# Patient Record
Sex: MALE | Race: WHITE | NOT HISPANIC OR LATINO | Employment: OTHER | URBAN - METROPOLITAN AREA
[De-identification: names, ages, dates, MRNs, and addresses within clinical notes are randomized per-mention and may not be internally consistent; named-entity substitution may affect disease eponyms.]

---

## 2017-01-09 ENCOUNTER — ALLSCRIPTS OFFICE VISIT (OUTPATIENT)
Dept: OTHER | Facility: OTHER | Age: 57
End: 2017-01-09

## 2017-01-09 DIAGNOSIS — I95.9 HYPOTENSION: ICD-10-CM

## 2017-01-09 DIAGNOSIS — E11.40 TYPE 2 DIABETES MELLITUS WITH DIABETIC NEUROPATHY (HCC): ICD-10-CM

## 2017-01-09 DIAGNOSIS — E11.9 TYPE 2 DIABETES MELLITUS WITHOUT COMPLICATIONS (HCC): ICD-10-CM

## 2017-01-09 DIAGNOSIS — E78.5 HYPERLIPIDEMIA: ICD-10-CM

## 2017-04-11 ENCOUNTER — ALLSCRIPTS OFFICE VISIT (OUTPATIENT)
Dept: OTHER | Facility: OTHER | Age: 57
End: 2017-04-11

## 2017-04-13 ENCOUNTER — ALLSCRIPTS OFFICE VISIT (OUTPATIENT)
Dept: OTHER | Facility: OTHER | Age: 57
End: 2017-04-13

## 2017-04-18 ENCOUNTER — GENERIC CONVERSION - ENCOUNTER (OUTPATIENT)
Dept: OTHER | Facility: OTHER | Age: 57
End: 2017-04-18

## 2017-04-18 LAB
LEFT EYE DIABETIC RETINOPATHY: NORMAL
RIGHT EYE DIABETIC RETINOPATHY: NORMAL

## 2017-07-13 ENCOUNTER — ALLSCRIPTS OFFICE VISIT (OUTPATIENT)
Dept: OTHER | Facility: OTHER | Age: 57
End: 2017-07-13

## 2017-09-13 ENCOUNTER — GENERIC CONVERSION - ENCOUNTER (OUTPATIENT)
Dept: OTHER | Facility: OTHER | Age: 57
End: 2017-09-13

## 2017-11-03 ENCOUNTER — ALLSCRIPTS OFFICE VISIT (OUTPATIENT)
Dept: OTHER | Facility: OTHER | Age: 57
End: 2017-11-03

## 2017-11-03 DIAGNOSIS — I95.9 HYPOTENSION: ICD-10-CM

## 2017-11-03 DIAGNOSIS — E78.5 HYPERLIPIDEMIA: ICD-10-CM

## 2017-11-03 DIAGNOSIS — E11.43 TYPE 2 DIABETES MELLITUS WITH AUTONOMIC NEUROPATHY (HCC): ICD-10-CM

## 2017-11-03 DIAGNOSIS — E11.9 TYPE 2 DIABETES MELLITUS WITHOUT COMPLICATIONS (HCC): ICD-10-CM

## 2017-11-03 DIAGNOSIS — E11.40 TYPE 2 DIABETES MELLITUS WITH DIABETIC NEUROPATHY (HCC): ICD-10-CM

## 2017-11-03 LAB — HBA1C MFR BLD HPLC: 10.5 %

## 2017-12-15 ENCOUNTER — APPOINTMENT (EMERGENCY)
Dept: RADIOLOGY | Facility: HOSPITAL | Age: 57
End: 2017-12-15
Payer: COMMERCIAL

## 2017-12-15 ENCOUNTER — HOSPITAL ENCOUNTER (EMERGENCY)
Facility: HOSPITAL | Age: 57
Discharge: HOME | End: 2017-12-16
Attending: EMERGENCY MEDICINE | Admitting: EMERGENCY MEDICINE
Payer: COMMERCIAL

## 2017-12-15 DIAGNOSIS — R06.02 SOB (SHORTNESS OF BREATH): ICD-10-CM

## 2017-12-15 DIAGNOSIS — I10 HYPERTENSION: Primary | ICD-10-CM

## 2017-12-15 LAB
ANION GAP SERPL CALCULATED.3IONS-SCNC: 8 MMOL/L (ref 4–13)
ATRIAL RATE: 70 BPM
ATRIAL RATE: 84 BPM
BASOPHILS # BLD AUTO: 0.05 THOUSANDS/ΜL (ref 0–0.1)
BASOPHILS NFR BLD AUTO: 1 % (ref 0–1)
BUN SERPL-MCNC: 14 MG/DL (ref 5–25)
CALCIUM SERPL-MCNC: 10 MG/DL (ref 8.3–10.1)
CHLORIDE SERPL-SCNC: 101 MMOL/L (ref 100–108)
CO2 SERPL-SCNC: 28 MMOL/L (ref 21–32)
CREAT SERPL-MCNC: 1.03 MG/DL (ref 0.6–1.3)
EOSINOPHIL # BLD AUTO: 0.32 THOUSAND/ΜL (ref 0–0.61)
EOSINOPHIL NFR BLD AUTO: 4 % (ref 0–6)
ERYTHROCYTE [DISTWIDTH] IN BLOOD BY AUTOMATED COUNT: 13.6 % (ref 11.6–15.1)
GFR SERPL CREATININE-BSD FRML MDRD: 80 ML/MIN/1.73SQ M
GLUCOSE SERPL-MCNC: 242 MG/DL (ref 65–140)
GLUCOSE SERPL-MCNC: 250 MG/DL (ref 65–140)
HCT VFR BLD AUTO: 44.9 % (ref 36.5–49.3)
HGB BLD-MCNC: 14.7 G/DL (ref 12–17)
LYMPHOCYTES # BLD AUTO: 2.96 THOUSANDS/ΜL (ref 0.6–4.47)
LYMPHOCYTES NFR BLD AUTO: 33 % (ref 14–44)
MCH RBC QN AUTO: 28.3 PG (ref 26.8–34.3)
MCHC RBC AUTO-ENTMCNC: 32.7 G/DL (ref 31.4–37.4)
MCV RBC AUTO: 87 FL (ref 82–98)
MONOCYTES # BLD AUTO: 0.75 THOUSAND/ΜL (ref 0.17–1.22)
MONOCYTES NFR BLD AUTO: 8 % (ref 4–12)
NEUTROPHILS # BLD AUTO: 4.9 THOUSANDS/ΜL (ref 1.85–7.62)
NEUTS SEG NFR BLD AUTO: 54 % (ref 43–75)
P AXIS: 69 DEGREES
P AXIS: 70 DEGREES
PLATELET # BLD AUTO: 270 THOUSANDS/UL (ref 149–390)
PMV BLD AUTO: 10.6 FL (ref 8.9–12.7)
POTASSIUM SERPL-SCNC: 5.3 MMOL/L (ref 3.5–5.3)
PR INTERVAL: 124 MS
PR INTERVAL: 142 MS
QRS AXIS: -9 DEGREES
QRS AXIS: 4 DEGREES
QRSD INTERVAL: 90 MS
QRSD INTERVAL: 92 MS
QT INTERVAL: 368 MS
QT INTERVAL: 394 MS
QTC INTERVAL: 425 MS
QTC INTERVAL: 434 MS
RBC # BLD AUTO: 5.19 MILLION/UL (ref 3.88–5.62)
SODIUM SERPL-SCNC: 137 MMOL/L (ref 136–145)
SPECIMEN SOURCE: NORMAL
T WAVE AXIS: 89 DEGREES
T WAVE AXIS: 97 DEGREES
TROPONIN I BLD-MCNC: 0 NG/ML (ref 0–0.08)
VENTRICULAR RATE: 70 BPM
VENTRICULAR RATE: 84 BPM
WBC # BLD AUTO: 8.98 THOUSAND/UL (ref 4.31–10.16)

## 2017-12-15 PROCEDURE — 84484 ASSAY OF TROPONIN QUANT: CPT

## 2017-12-15 PROCEDURE — 80048 BASIC METABOLIC PNL TOTAL CA: CPT | Performed by: EMERGENCY MEDICINE

## 2017-12-15 PROCEDURE — 93005 ELECTROCARDIOGRAM TRACING: CPT

## 2017-12-15 PROCEDURE — 82948 REAGENT STRIP/BLOOD GLUCOSE: CPT

## 2017-12-15 PROCEDURE — 85025 COMPLETE CBC W/AUTO DIFF WBC: CPT | Performed by: EMERGENCY MEDICINE

## 2017-12-15 PROCEDURE — 93005 ELECTROCARDIOGRAM TRACING: CPT | Performed by: EMERGENCY MEDICINE

## 2017-12-15 PROCEDURE — 36415 COLL VENOUS BLD VENIPUNCTURE: CPT | Performed by: EMERGENCY MEDICINE

## 2017-12-15 PROCEDURE — 71020 HB CHEST X-RAY 2VW FRONTAL&LATL: CPT

## 2017-12-15 PROCEDURE — 96374 THER/PROPH/DIAG INJ IV PUSH: CPT

## 2017-12-15 RX ORDER — ATORVASTATIN CALCIUM 40 MG/1
40 TABLET, FILM COATED ORAL
COMMUNITY
Start: 2017-03-02 | End: 2018-02-20 | Stop reason: SDUPTHER

## 2017-12-15 RX ORDER — LORAZEPAM 2 MG/ML
0.5 INJECTION INTRAMUSCULAR ONCE
Status: COMPLETED | OUTPATIENT
Start: 2017-12-15 | End: 2017-12-15

## 2017-12-15 RX ORDER — MIDODRINE HYDROCHLORIDE 5 MG/1
5 TABLET ORAL
COMMUNITY
Start: 2017-03-02 | End: 2018-02-20 | Stop reason: SDUPTHER

## 2017-12-15 RX ORDER — METOCLOPRAMIDE 5 MG/1
5 TABLET ORAL DAILY
COMMUNITY
End: 2018-04-04 | Stop reason: HOSPADM

## 2017-12-15 RX ADMIN — LORAZEPAM 0.5 MG: 2 INJECTION INTRAMUSCULAR; INTRAVENOUS at 23:03

## 2017-12-16 VITALS
SYSTOLIC BLOOD PRESSURE: 157 MMHG | TEMPERATURE: 98.1 F | DIASTOLIC BLOOD PRESSURE: 70 MMHG | RESPIRATION RATE: 18 BRPM | WEIGHT: 256 LBS | HEART RATE: 79 BPM | OXYGEN SATURATION: 99 %

## 2017-12-16 LAB
ALBUMIN SERPL BCP-MCNC: 3.5 G/DL (ref 3.5–5)
ALP SERPL-CCNC: 53 U/L (ref 46–116)
ALT SERPL W P-5'-P-CCNC: 41 U/L (ref 12–78)
AST SERPL W P-5'-P-CCNC: 34 U/L (ref 5–45)
BILIRUB DIRECT SERPL-MCNC: 0.07 MG/DL (ref 0–0.2)
BILIRUB SERPL-MCNC: 0.49 MG/DL (ref 0.2–1)
NT-PROBNP SERPL-MCNC: 794 PG/ML
PROT SERPL-MCNC: 7.7 G/DL (ref 6.4–8.2)
SPECIMEN SOURCE: NORMAL
TROPONIN I BLD-MCNC: 0.01 NG/ML (ref 0–0.08)

## 2017-12-16 PROCEDURE — 80076 HEPATIC FUNCTION PANEL: CPT | Performed by: EMERGENCY MEDICINE

## 2017-12-16 PROCEDURE — 93005 ELECTROCARDIOGRAM TRACING: CPT

## 2017-12-16 PROCEDURE — 36415 COLL VENOUS BLD VENIPUNCTURE: CPT | Performed by: EMERGENCY MEDICINE

## 2017-12-16 PROCEDURE — 84484 ASSAY OF TROPONIN QUANT: CPT

## 2017-12-16 PROCEDURE — 83880 ASSAY OF NATRIURETIC PEPTIDE: CPT | Performed by: EMERGENCY MEDICINE

## 2017-12-16 PROCEDURE — 99284 EMERGENCY DEPT VISIT MOD MDM: CPT

## 2017-12-16 NOTE — DISCHARGE INSTRUCTIONS
Chronic Hypertension, Ambulatory Care   GENERAL INFORMATION:   Chronic hypertension  is a long-term condition in which your blood pressure (BP) is higher than normal  Your BP is the force of your blood moving against the walls of your arteries  Hypertension is a BP of 140/90 or higher  Common symptoms include the following:   · Headache     · Blurred vision    · Chest pain     · Dizziness or weakness     · Trouble breathing     · Nosebleeds  Seek immediate care for the following symptoms:   · Severe headache or vision loss    · Weakness in an arm or leg    · Confusion or difficulty speaking    · Discomfort in your chest that feels like squeezing, pressure, fullness, or pain    · Suddenly feeling lightheaded or trouble breathing    · Pain or discomfort in your back, neck, jaw, stomach, or arm  Treatment for chronic hypertension  may include medicine to lower your BP  You may also need to make lifestyle changes  Take your medicine exactly as directed  Manage chronic hypertension:   · Take your BP at home  Sit and rest for 5 minutes before you take your BP  Extend your arm and support it on a flat surface  Your arm should be at the same level as your heart  Follow the directions that came with your BP monitor  If possible, take at least 2 BP readings each time  Take your BP at least twice a day at the same times each day, such as morning and evening  Keep a log of your BP readings and bring it to your follow-up visits  · Eat less sodium (salt)  Do not add sodium to your food  Limit foods that are high in sodium, such as canned foods, potato chips, and cold cuts  Your healthcare provider may suggest that you follow the [address] [place] [address] Street  The plan is low in sodium, unhealthy fats, and total fat  It is high in potassium, calcium, and fiber  · Exercise regularly  Exercise at least 30 minutes per day, on most days of the week  This will help decrease your BP   Ask your healthcare provider about the best exercise plan for you  · Limit alcohol  Women should limit alcohol to 1 drink a day  Men should limit alcohol to 2 drinks a day  A drink of alcohol is 12 ounces of beer, 5 ounces of wine, or 1½ ounces of liquor  · Do not smoke  If you smoke, it is never too late to quit  Smoking can increase your BP  Smoking also worsens other health conditions you may have that can increase your risk for hypertension  Ask your healthcare provider for information if you need help quitting  Follow up with your healthcare provider as directed: You will need to return to have your BP checked and to have other lab tests done  Write down your questions so you remember to ask them during your visits  CARE AGREEMENT:   You have the right to help plan your care  Learn about your health condition and how it may be treated  Discuss treatment options with your caregivers to decide what care you want to receive  You always have the right to refuse treatment  The above information is an  only  It is not intended as medical advice for individual conditions or treatments  Talk to your doctor, nurse or pharmacist before following any medical regimen to see if it is safe and effective for you  © 2014 [address] is for End User's use only and may not be sold, redistributed or otherwise used for commercial purposes  All illustrations and images included in CareNotes® are the copyrighted property of [employer]  or [employer]  Hypertension   WHAT YOU NEED TO KNOW:   Hypertension is high blood pressure (BP)  Your BP is the force of your blood moving against the walls of your arteries  Normal BP is less than 120/80  Prehypertension is between 120/80 and 139/89  Hypertension is 140/90 or higher  Hypertension causes your BP to get so high that your heart has to work much harder than normal  This can damage your heart   You can control hypertension with a healthy lifestyle or medicines  A controlled blood pressure helps protect your organs, such as your heart, lungs, brain, and kidneys  DISCHARGE INSTRUCTIONS:   Call 911 for any of the following:   · You have discomfort in your chest that feels like squeezing, pressure, fullness, or pain  · You become confused or have difficulty speaking  · You suddenly feel lightheaded or have trouble breathing  · You have pain or discomfort in your back, neck, jaw, stomach, or arm  Return to the emergency department if:   · You have a severe headache or vision loss  · You have weakness in an arm or leg  Contact your healthcare provider if:   · You feel faint, dizzy, confused, or drowsy  · You have been taking your BP medicine and your BP is still higher than your healthcare provider says it should be  · You have questions or concerns about your condition or care  Medicines: You may  need any of the following:  · Medicine  may be used to help lower your BP  You may need more than one type of medicine  Take the medicine exactly as directed  · Diuretics  help decrease extra fluid that collects in your body  This will help lower your BP  You may urinate more often while you take this medicine  · Cholesterol medicine  helps lower your cholesterol level  A low cholesterol level helps prevent heart disease and makes it easier to control your blood pressure  · Take your medicine as directed  Contact your healthcare provider if you think your medicine is not helping or if you have side effects  Tell him or her if you are allergic to any medicine  Keep a list of the medicines, vitamins, and herbs you take  Include the amounts, and when and why you take them  Bring the list or the pill bottles to follow-up visits  Carry your medicine list with you in case of an emergency  Follow up with your healthcare provider as directed: You will need to return to have your BP checked and to have other lab tests done   Write down your questions so you remember to ask them during your visits  Manage hypertension:  Talk with your healthcare provider about these and other ways to manage hypertension:  · Check your BP at home  Sit and rest for 5 minutes before you take your BP  Extend your arm and support it on a flat surface  Your arm should be at the same level as your heart  Follow the directions that came with your BP monitor  If possible, take at least 2 BP readings each time  Take your BP at least twice a day at the same times each day, such as morning and evening  Keep a record of your BP readings and bring it to your follow-up visits  Ask your healthcare provider what your BP should be  · Limit sodium (salt) as directed  Too much sodium can affect your fluid balance  Check labels to find low-sodium or no-salt-added foods  Some low-sodium foods use potassium salts for flavor  Too much potassium can also cause health problems  Your healthcare provider will tell you how much sodium and potassium are safe for you to have in a day  He or she may recommend that you limit sodium to 2,300 mg a day  · Follow the meal plan recommended by your healthcare provider  A dietitian or your provider can give you more information on low-sodium plans or the DASH (Dietary Approaches to Stop Hypertension) eating plan  The DASH plan is low in sodium, unhealthy fats, and total fat  It is high in potassium, calcium, and fiber  · Exercise to maintain a healthy weight  Exercise at least 30 minutes per day, on most days of the week  This will help decrease your blood pressure  Ask your healthcare provider about the best exercise plan for you  · Decrease stress  This may help lower your BP  Learn ways to relax, such as deep breathing or listening to music  · Limit alcohol  Women should limit alcohol to 1 drink a day  Men should limit alcohol to 2 drinks a day   A drink of alcohol is 12 ounces of beer, 5 ounces of wine, or 1½ ounces of liquor  · Do not smoke  Nicotine and other chemicals in cigarettes and cigars can increase your BP and also cause lung damage  Ask your healthcare provider for information if you currently smoke and need help to quit  E-cigarettes or smokeless tobacco still contain nicotine  Talk to your healthcare provider before you use these products  · Manage any other health conditions you have  Health conditions such as diabetes can increase your risk for hypertension  Follow your healthcare provider's instructions and take all your medicines as directed  © 2017 [address]  Information is for End User's use only and may not be sold, redistributed or otherwise used for commercial purposes  All illustrations and images included in CareNotes® are the copyrighted property of [employer]  or [employer]  The above information is an  only  It is not intended as medical advice for individual conditions or treatments  Talk to your doctor, nurse or pharmacist before following any medical regimen to see if it is safe and effective for you  Shortness of Breath   WHAT YOU NEED TO KNOW:   What is shortness of breath? Shortness of breath is a feeling that you cannot get enough air when you breathe in  You may have this feeling only during activity, or all the time  Your symptoms can range from mild to severe  Shortness of breath may be a sign of a serious health condition that needs immediate care  What causes or increases my risk for shortness of breath?    · A lung condition, such as pneumonia, asthma, or a blood clot in your lung    · Heart, kidney, or liver disease    · Lung cancer or lung damage, such as from asbestos    · Smoking cigarettes    · Anxiety or a panic attack    · Physical activity such as running or climbing stairs, especially if you are out of shape    · Being overweight    · Travel to high altitude    · Anemia (low red blood cell count)  How is the cause of shortness of breath diagnosed? Your healthcare provider will listen to your breathing and check for signs of a serious health condition  Signs include blue lips or fingernails or chest pain that happens with shortness of breath  Tell your provider if shortness of breath keeps you from walking or talking easily  Your provider will also check your memory and alertness  Tell your provider when your shortness of breath started and how severe it is  Describe anything that starts your symptoms, such as physical activity  Also tell your provider anything you do to make breathing easier  You may also need any of the following:  · Blood tests  may be used to check your oxygen level or to find health conditions such as anemia  Anemia is too few red blood cells (RBCs)  RBCs carry oxygen throughout your body  Blood tests may also be used to check for signs of infection or organ failure  · A pulse oximeter  is a device that measures the amount of oxygen in your blood  A cord with a clip or sticky strip is placed on your finger, ear, or toe  The other end of the cord is hooked to a machine  · Spirometry  is a test to measure how strong your breathing is  You will breathe out as hard as you can into a plastic tube called a spirometer  · X-ray  pictures may show signs of infection or fluid around your heart or lungs  · Exercise tests  help your healthcare provider learn if you have symptoms that limit activity  Symptoms include leg pain, fatigue, and weakness  Exercise tests can also show if your symptoms are caused by heart problems  · CT scan  pictures may show blood clots or an area of disease in your lungs  You may be given contrast liquid to help your lungs show up better in the pictures  Tell the healthcare provider if you have ever had an allergic reaction to contrast liquid  · An echocardiogram  is a type of ultrasound   Sound waves are used to show the structure and function of your heart     · An EKG  is a test that measures the electrical activity of your heart  How is shortness of breath treated? · Medicines  may be used to treat the cause of your symptoms  You may need medicine to treat a bacterial infection or reduce anxiety  Other medicines may be used to open your airway, reduce swelling, or remove extra fluid  If you have a heart condition, you may need medicine to help your heart beat more strongly or regularly  · Oxygen  may be given to help you breathe more easily  What can I do to manage shortness of breath? · Create an action plan  You and your healthcare provider can work together to create a plan for how to handle shortness of breath  The plan can include daily activities, treatment changes, and what to do if you have severe breathing problems  · Lean forward on your elbows when you sit  This helps your lungs expand and may make it easier to breathe  · Use pursed-lip breathing any time you feel short of breath  Breathe in through your nose and then slowly breathe out through your mouth with your lips slightly puckered  It should take you twice as long to breathe out as it did to breathe in  · Do not smoke  Nicotine and other chemicals in cigarettes and cigars can cause lung damage and make shortness of breath worse  Ask your healthcare provider for information if you currently smoke and need help to quit  E-cigarettes or smokeless tobacco still contain nicotine  Talk to your healthcare provider before you use these products  · Reach or maintain a healthy weight  Your healthcare provider can help you create a safe weight loss plan if you are overweight  · Exercise as directed  Exercise can help your lungs work more easily  Exercise can also help you lose weight if needed  Try to get at least 30 minutes of exercise most days of the week  When should I seek immediate care?    · Your signs and symptoms are the same or worse within 24 hours of treatment  · The skin over your ribs or on your neck sinks in when you breathe  · You feel confused or dizzy  When should I contact my healthcare provider? · You have new or worsening symptoms  · You have questions or concerns about your condition or care  CARE AGREEMENT:   You have the right to help plan your care  Learn about your health condition and how it may be treated  Discuss treatment options with your caregivers to decide what care you want to receive  You always have the right to refuse treatment  The above information is an  only  It is not intended as medical advice for individual conditions or treatments  Talk to your doctor, nurse or pharmacist before following any medical regimen to see if it is safe and effective for you  © 2017 [address]  Information is for End User's use only and may not be sold, redistributed or otherwise used for commercial purposes  All illustrations and images included in CareNotes® are the copyrighted property of [employer]  or [employer]

## 2017-12-16 NOTE — ED NOTES
Pt not complaining of any stroke symptoms; states "My blood pressure is usually low but it was high tonight and I read online that I can get a stroke so I'm here because I think I'm having a stroke"        Yanet Donato RN  12/15/17 [id number]

## 2017-12-16 NOTE — ED ATTENDING ATTESTATION
Ed DO Shabnam, saw and evaluated the patient  I have discussed the patient with the resident/non-physician practitioner and agree with the resident's/non-physician practitioner's findings, Plan of Care, and MDM as documented in the resident's/non-physician practitioner's note, except where noted  All available labs and Radiology studies were reviewed  At this point I agree with the current assessment done in the Emergency Department  I have conducted an independent evaluation of this patient a history and physical is as follows:    Patient is a 62-year-old male with a history of diabetes and orthostatic hypotension that presents for 2 weeks of intermittent shortness of breath  He states that he has been feeling anxious and stressed lately  He denies any chest pain  He states that he has been having some digestive issues where he feels like his food isn't passing like it should  He is on Reglan for diabetic gastroparesis  States that he has had some intermittent leg swelling  He states that yes to sleep in a recliner and is unable to lie flat  The reason why came in tonight ultimately was because his blood pressure was elevated  He is on Midrin but states that he has not taken any more than he normally does  He states that he was feeling a little bit more short of breath and he had blood pressure readings in the 359 systolic at home  This is markedly abnormal and given his symptoms he came into the emergency department for evaluation  On the initial residence exam the patient was a little anxious  On my exam he was much more calm and relaxed  He did receive Ativan  On my exam his vital signs were normal with a blood pressure in the 130s over 60s  Heart and lungs were regular rate and rhythm and clear to auscultation bilaterally  Abdomen was soft, nontender and nondistended  He had no JVD  He had no pretibial edema but mild nonpitting edema to the bilateral feet      Labs and EKG were done along with a chest x-ray  All these showed no abnormalities  BMP was slightly elevated in the 700s but no clinical evidence of CHF on exam or on chest x-ray  EKG did show some nonspecific changes, more specifically mild ST depression in the lateral leads  Looking back at his [facility] records he did have some nonspecific changes on his EKG is there  Unsure if it was were seen today  This does not appear to be cardiac in nature but the plan is to continue to observe to do a 3 hour delta troponin  This is normal and his blood pressure remains in the normal range without symptoms I feel that he is safe for discharge home with follow-up with his PCP and Cardiology  Patient agrees with this plan    Critical Care Time  CritCare Time

## 2017-12-16 NOTE — ED RE-EVALUATION NOTE
Repeat troponin is 0 01  This is in the normal range  His repeat EKG actually appears improved with the mild ST depressions that he had before  Blood pressure is slightly elevated at 157/70  Patient has no shortness of breath at this time  He we had a long discussion again about anxiety  I advised that he follow up with his PCP and if he absolutely needs something in the meantime to try some Benadryl  We discussed returning to the ER if anything worsens and other precautions  Patient and his wife understand and agree with the plan of care  They agree with follow-up and all questions and concerns answered

## 2017-12-17 LAB
ATRIAL RATE: 79 BPM
P AXIS: 74 DEGREES
PR INTERVAL: 134 MS
QRS AXIS: 0 DEGREES
QRSD INTERVAL: 96 MS
QT INTERVAL: 378 MS
QTC INTERVAL: 433 MS
T WAVE AXIS: 94 DEGREES
VENTRICULAR RATE: 79 BPM

## 2017-12-28 ENCOUNTER — GENERIC CONVERSION - ENCOUNTER (OUTPATIENT)
Dept: OTHER | Facility: OTHER | Age: 57
End: 2017-12-28

## 2017-12-28 DIAGNOSIS — I95.9 HYPOTENSION: ICD-10-CM

## 2017-12-28 DIAGNOSIS — E11.9 TYPE 2 DIABETES MELLITUS WITHOUT COMPLICATIONS (HCC): ICD-10-CM

## 2017-12-28 DIAGNOSIS — E55.9 VITAMIN D DEFICIENCY: ICD-10-CM

## 2017-12-28 DIAGNOSIS — E78.5 HYPERLIPIDEMIA: ICD-10-CM

## 2017-12-28 DIAGNOSIS — E11.43 TYPE 2 DIABETES MELLITUS WITH AUTONOMIC NEUROPATHY (HCC): ICD-10-CM

## 2017-12-28 DIAGNOSIS — E11.40 TYPE 2 DIABETES MELLITUS WITH DIABETIC NEUROPATHY (HCC): ICD-10-CM

## 2017-12-28 DIAGNOSIS — R60.0 LOCALIZED EDEMA: ICD-10-CM

## 2018-01-02 ENCOUNTER — TRANSCRIBE ORDERS (OUTPATIENT)
Dept: LAB | Facility: CLINIC | Age: 58
End: 2018-01-02

## 2018-01-02 ENCOUNTER — APPOINTMENT (OUTPATIENT)
Dept: LAB | Facility: CLINIC | Age: 58
End: 2018-01-02
Payer: COMMERCIAL

## 2018-01-02 DIAGNOSIS — I95.9 HYPOTENSION: ICD-10-CM

## 2018-01-02 DIAGNOSIS — E11.9 TYPE 2 DIABETES MELLITUS WITHOUT COMPLICATIONS (HCC): ICD-10-CM

## 2018-01-02 DIAGNOSIS — E11.40 TYPE 2 DIABETES MELLITUS WITH DIABETIC NEUROPATHY (HCC): ICD-10-CM

## 2018-01-02 DIAGNOSIS — E78.5 HYPERLIPIDEMIA: ICD-10-CM

## 2018-01-02 DIAGNOSIS — E11.43 TYPE 2 DIABETES MELLITUS WITH AUTONOMIC NEUROPATHY (HCC): ICD-10-CM

## 2018-01-02 DIAGNOSIS — E55.9 VITAMIN D DEFICIENCY: ICD-10-CM

## 2018-01-02 LAB
ALBUMIN SERPL BCP-MCNC: 3.7 G/DL (ref 3.5–5)
ALP SERPL-CCNC: 46 U/L (ref 46–116)
ALT SERPL W P-5'-P-CCNC: 44 U/L (ref 12–78)
ANION GAP SERPL CALCULATED.3IONS-SCNC: 9 MMOL/L (ref 4–13)
AST SERPL W P-5'-P-CCNC: 25 U/L (ref 5–45)
BACTERIA UR QL AUTO: ABNORMAL /HPF
BASOPHILS # BLD AUTO: 0.04 THOUSANDS/ΜL (ref 0–0.1)
BASOPHILS NFR BLD AUTO: 0 % (ref 0–1)
BILIRUB SERPL-MCNC: 0.44 MG/DL (ref 0.2–1)
BILIRUB UR QL STRIP: NEGATIVE
BUN SERPL-MCNC: 19 MG/DL (ref 5–25)
CALCIUM SERPL-MCNC: 9.8 MG/DL (ref 8.3–10.1)
CHLORIDE SERPL-SCNC: 100 MMOL/L (ref 100–108)
CHOLEST SERPL-MCNC: 169 MG/DL (ref 50–200)
CLARITY UR: CLEAR
CO2 SERPL-SCNC: 29 MMOL/L (ref 21–32)
COLOR UR: YELLOW
CREAT SERPL-MCNC: 1.08 MG/DL (ref 0.6–1.3)
EOSINOPHIL # BLD AUTO: 0.32 THOUSAND/ΜL (ref 0–0.61)
EOSINOPHIL NFR BLD AUTO: 3 % (ref 0–6)
ERYTHROCYTE [DISTWIDTH] IN BLOOD BY AUTOMATED COUNT: 13.2 % (ref 11.6–15.1)
GFR SERPL CREATININE-BSD FRML MDRD: 75 ML/MIN/1.73SQ M
GLUCOSE P FAST SERPL-MCNC: 128 MG/DL (ref 65–99)
GLUCOSE UR STRIP-MCNC: ABNORMAL MG/DL
HCT VFR BLD AUTO: 42.9 % (ref 36.5–49.3)
HDLC SERPL-MCNC: 38 MG/DL (ref 40–60)
HGB BLD-MCNC: 14.3 G/DL (ref 12–17)
HGB UR QL STRIP.AUTO: NEGATIVE
HYALINE CASTS #/AREA URNS LPF: ABNORMAL /LPF
KETONES UR STRIP-MCNC: NEGATIVE MG/DL
LDLC SERPL CALC-MCNC: 96 MG/DL (ref 0–100)
LEUKOCYTE ESTERASE UR QL STRIP: ABNORMAL
LYMPHOCYTES # BLD AUTO: 2.57 THOUSANDS/ΜL (ref 0.6–4.47)
LYMPHOCYTES NFR BLD AUTO: 24 % (ref 14–44)
MCH RBC QN AUTO: 28.9 PG (ref 26.8–34.3)
MCHC RBC AUTO-ENTMCNC: 33.3 G/DL (ref 31.4–37.4)
MCV RBC AUTO: 87 FL (ref 82–98)
MONOCYTES # BLD AUTO: 0.8 THOUSAND/ΜL (ref 0.17–1.22)
MONOCYTES NFR BLD AUTO: 8 % (ref 4–12)
NEUTROPHILS # BLD AUTO: 6.81 THOUSANDS/ΜL (ref 1.85–7.62)
NEUTS SEG NFR BLD AUTO: 65 % (ref 43–75)
NITRITE UR QL STRIP: NEGATIVE
NON-SQ EPI CELLS URNS QL MICRO: ABNORMAL /HPF
NRBC BLD AUTO-RTO: 0 /100 WBCS
PH UR STRIP.AUTO: 6 [PH] (ref 4.5–8)
PLATELET # BLD AUTO: 265 THOUSANDS/UL (ref 149–390)
PMV BLD AUTO: 10.6 FL (ref 8.9–12.7)
POTASSIUM SERPL-SCNC: 4.4 MMOL/L (ref 3.5–5.3)
PROT SERPL-MCNC: 8.3 G/DL (ref 6.4–8.2)
PROT UR STRIP-MCNC: ABNORMAL MG/DL
RBC # BLD AUTO: 4.94 MILLION/UL (ref 3.88–5.62)
RBC #/AREA URNS AUTO: ABNORMAL /HPF
SODIUM SERPL-SCNC: 138 MMOL/L (ref 136–145)
SP GR UR STRIP.AUTO: 1.02 (ref 1–1.03)
TRIGL SERPL-MCNC: 176 MG/DL
TSH SERPL DL<=0.05 MIU/L-ACNC: 3.22 UIU/ML (ref 0.36–3.74)
UROBILINOGEN UR QL STRIP.AUTO: 0.2 E.U./DL
WBC # BLD AUTO: 10.56 THOUSAND/UL (ref 4.31–10.16)
WBC #/AREA URNS AUTO: ABNORMAL /HPF

## 2018-01-02 PROCEDURE — 80053 COMPREHEN METABOLIC PANEL: CPT

## 2018-01-02 PROCEDURE — 81001 URINALYSIS AUTO W/SCOPE: CPT

## 2018-01-02 PROCEDURE — 84443 ASSAY THYROID STIM HORMONE: CPT

## 2018-01-02 PROCEDURE — 36415 COLL VENOUS BLD VENIPUNCTURE: CPT

## 2018-01-02 PROCEDURE — 85025 COMPLETE CBC W/AUTO DIFF WBC: CPT

## 2018-01-02 PROCEDURE — 80061 LIPID PANEL: CPT

## 2018-01-10 NOTE — PROGRESS NOTES
Assessment    1  Type 2 diabetes mellitus (250 00) (E11 9)   2  Diabetic autonomic neuropathy associated with type 2 diabetes mellitus (250 60,337 1)   (E11 43)   3  Diabetic neuropathy, type II diabetes mellitus (250 60,357 2) (E11 40)   4  Hyperlipidemia (272 4) (E78 5)   5  Hypotension (458 9) (I95 9)    Plan   Diabetic autonomic neuropathy associated with type 2 diabetes mellitus    · Metoclopramide HCl - 5 MG Oral Tablet; Take 1 tablet 4 times daily  Diabetic autonomic neuropathy associated with type 2 diabetes mellitus, Diabetic  neuropathy, type II diabetes mellitus, Hyperlipidemia, Hypotension, Type 2 diabetes  mellitus    · Follow-up visit in 3 months Evaluation and Treatment  Follow-up  Status: Hold For -  Scheduling  Requested for: [id number]   · (1) CBC/PLT/DIFF; Status:Active; Requested DXI:[id number];    · (1) COMPREHENSIVE METABOLIC PANEL; Status:Active; Requested for:03Nov2017;    · (1) HEMOGLOBIN A1C; Status:Active; Requested RPC:65VMS0246;    · (1) LIPID PANEL FASTING W DIRECT LDL REFLEX; Status:Active; Requested  for:03Nov2017;    · (1) MICROALBUMIN CREATININE RATIO, RANDOM URINE; Status:Active; Requested  for:03Nov2017;    · (1) TSH WITH FT4 REFLEX; Status:Active; Requested for:03Nov2017;   Hyperlipidemia    · From  Atorvastatin Calcium 40 MG Oral Tablet TAKE 1 TABLET AT BEDTIME  To Atorvastatin Calcium 40 MG Oral Tablet TAKE ONE TABLET BY MOUTH ONCE DAILY  Hypotension    · Midodrine HCl - 5 MG Oral Tablet; TAKE 1 TABLET 3 TIMES DAILY    Hemoglobin A1c- POC; Status:Complete;   Done: 07UKK5782 12:00AM  DPK:[id number]; Ordered; For:Diabetic neuropathy, type II diabetes mellitus; Ordered By:Melissa Viramontes; Chief Complaint  Patient presents today for physical  He had A1c POC results 10 5  Patient states he brought disability paper work to be filled out   He requests to be perscribed metoclopram 5 mg and midodrine 5 mg or some kind of alternative because he stopped taking them because they were to expensive  Requested orthostatic blood pressures to be taken  No other concerns  History of Present Illness  HPI: Patient is here for follow-up on diabetes hypertension hyperlipidemia orthostatic hypotension diabetic neuropathy and autonomic neuropathy  Patient using walker as well as cane for ambulation  Patient with blurred vision  Patient will see the ophthalmologist again on the 20 second  Patient was without insurance for roughly 4 months but recently got insurance again  Patient was off insulin for roughly 2 months  Patient wishes to restart medications  A1c 10 5 today  Patient is test strips also  Patient does notice some bloating and gassiness no chest pain or shortness of breath  Patient with intermittent constipation and diarrhea  Diet unchanged  Patient still with fatigue and tiredness  Patient could only stand for 3 minutes and then patient notices back pain and tightness  Review of Systems    Constitutional: feeling tired, but as noted in HPI  Eyes: as noted in HPI    ENT: no complaints of earache, no hearing loss, no nosebleeds, no nasal discharge, no sore throat, no hoarseness  Cardiovascular: No complaints of slow heart rate, no fast heart rate, no chest pain, no palpitations, no leg claudication, no lower extremity  Respiratory: No complaints of shortness of breath, no wheezing, no cough, no SOB on exertion, no orthopnea or PND  Gastrointestinal: as noted in HPI  Genitourinary: as noted in HPI  Musculoskeletal: as noted in HPI  Integumentary: No complaints of skin rash or skin lesions, no itching, no skin wound, no dry skin  Neurological: as noted in HPI  Psychiatric: Is not suicidal, no sleep disturbances, no anxiety or depression, no change in personality, no emotional problems  Endocrine: No complaints of proptosis, no hot flashes, no muscle weakness, no erectile dysfunction, no deepening of the voice, no feelings of weakness     Hematologic/Lymphatic: No complaints of swollen glands, no swollen glands in the neck, does not bleed easily, no easy bruising  Active Problems    1  Depression screening (V79 0) (Z13 89)   2  Diabetic autonomic neuropathy associated with type 2 diabetes mellitus (250 60,337 1)   (E11 43)   3  Diabetic neuropathy, type II diabetes mellitus (250 60,357 2) (E11 40)   4  Fractured rib (807 00) (S22 39XA)   5  Hyperlipidemia (272 4) (E78 5)   6  Hypotension (458 9) (I95 9)   7  Peripheral neuropathy (356 9) (G62 9)   8  Right shoulder pain (719 41) (M25 511)   9  Type 2 diabetes mellitus (250 00) (E11 9)   10  Vitamin D deficiency (268 9) (E55 9)    Family History  Mother    · No pertinent family history    Social History    · Former smoker (G60 27) (S41 486)   · No alcohol use    Current Meds   1  Atorvastatin Calcium 40 MG Oral Tablet; TAKE 1 TABLET AT BEDTIME; Therapy: (Recorded:11Apr2017) to Recorded   2  Metoclopramide HCl - 5 MG Oral Tablet; Take 1 tablet 4 times daily; Therapy: (Recorded:11Apr2017) to Recorded   3  Midodrine HCl - 5 MG Oral Tablet; TAKE 1 TABLET 3 TIMES DAILY; Therapy: (Recorded:11Apr2017) to Recorded   4  Ladonna-Rul Vitamin D 5000 UNIT Oral Tablet; Take 1 tablet daily; Therapy: (Recorded:11Apr2017) to Recorded   5  NovoLOG Mix 70/30 FlexPen (70-30) 100 UNIT/ML Subcutaneous Suspension   Pen-injector; INJECT 80 UNIT Twice daily; Therapy: 69SGT2665 to (Last Rx:17Skb4774)  Requested for: 54Olh6133 Ordered    Allergies    1  MetFORMIN HCl TABS    Vitals   Recorded: 49HML5621 03:18PM Recorded: 21MSJ7695 03:17PM   Temperature   99 F, Tympanic   Systolic 327, RUE, Standing 130, RUE, Supine 140, RUE, Sitting   Diastolic 62, RUE, Standing 76, RUE, Supine 90, RUE, Sitting   Height   6 ft 1 in   Weight   262 lb    BMI Calculated   34 57   BSA Calculated   2 41     Physical Exam    Constitutional   General appearance: Abnormal     Eyes   Conjunctiva and lids: No swelling, erythema, or discharge      Pupils and irises: Equal, round and reactive to light  Ears, Nose, Mouth, and Throat   External inspection of ears and nose: Normal     Otoscopic examination: Tympanic membrance translucent with normal light reflex  Canals patent without erythema  Oropharynx: Normal with no erythema, edema, exudate or lesions  Pulmonary   Respiratory effort: No increased work of breathing or signs of respiratory distress  Auscultation of lungs: Clear to auscultation  Cardiovascular   Palpation of heart: Normal PMI, no thrills  Auscultation of heart: Normal rate and rhythm, normal S1 and S2, without murmurs  Examination of extremities for edema and/or varicosities: Normal     Abdomen   Abdomen: Non-tender, no masses  Liver and spleen: No hepatomegaly or splenomegaly  Lymphatic   Palpation of lymph nodes in neck: No lymphadenopathy  Musculoskeletal   Gait and station: Abnormal     Digits and nails: Normal without clubbing or cyanosis  Inspection/palpation of joints, bones, and muscles: Abnormal     Skin   Skin and subcutaneous tissue: Abnormal   abrasions anterior shins  Neurologic   Cranial nerves: Cranial nerves 2-12 intact      Reflexes: Abnormal     Sensation: Abnormal     Psychiatric   Orientation to person, place and time: Normal     Mood and affect: Normal        Signatures   Electronically signed by : Natasha Mendez DO; Nov  3 2017  3:44PM EST                       (Author)

## 2018-01-11 ENCOUNTER — ALLSCRIPTS OFFICE VISIT (OUTPATIENT)
Dept: OTHER | Facility: OTHER | Age: 58
End: 2018-01-11

## 2018-01-11 LAB — GLUCOSE SERPL-MCNC: 130 MG/DL

## 2018-01-11 NOTE — RESULT NOTES
Message   The patient is to have a follow appointment to review his lab results       Verified Results  (1) CBC/PLT/DIFF 12Zea3126 09:24AM Nithya Barrientos     Test Name Result Flag Reference   WBC 11 2 x10E3/uL H 3 4-10 8   RBC 5 36 x10E6/uL  4 14-5 80   Hemoglobin 15 4 g/dL  12 6-17 7   Hematocrit 46 3 %  37 5-51 0   MCV 86 fL  79-97   MCH 28 7 pg  26 6-33 0   MCHC 33 3 g/dL  31 5-35 7   RDW 14 4 %  12 3-15 4   Platelets 461 Z29X3/IO  150-379   Neutrophils 66 %     Lymphs 25 %     Monocytes 6 %     Eos 3 %     Basos 0 %     Neutrophils (Absolute) 7 4 x10E3/uL H 1 4-7 0   Lymphs (Absolute) 2 7 x10E3/uL  0 7-3 1   Monocytes(Absolute) 0 7 x10E3/uL  0 1-0 9   Eos (Absolute) 0 3 x10E3/uL  0 0-0 4   Baso (Absolute) 0 0 x10E3/uL  0 0-0 2   Immature Granulocytes 0 %     Immature Grans (Abs) 0 0 x10E3/uL  0 0-0 1

## 2018-01-12 NOTE — MISCELLANEOUS
Provider Comments  Provider Comments:   PT MISSED APPOINTMENT 7/13/2017 WITH DR PIKN      Signatures   Electronically signed by : Lashonda Zuñiga, ; Jul 13 2017 10:56AM EST                       (Author)

## 2018-01-12 NOTE — PROGRESS NOTES
Assessment   1  Type 2 diabetes mellitus with diabetic autonomic neuropathy, with long-term current use     of insulin (250 60,337 1,V58 67) (O87 53,X00 8)   2  Bilateral leg edema (782 3) (R60 0)   3  Anxiety (300 00) (F41 9)    Plan   Anxiety    · FLUoxetine HCl - 10 MG Oral Capsule; TAKE 1 CAPSULE Daily   · LORazepam 0 5 MG Oral Tablet (Ativan); TAKE 1 TABLET EVERY 12 HOURS AS    NEEDED  Bilateral leg edema    · Furosemide 20 MG Oral Tablet (Lasix); TAKE 1 TABLET EVERY DAY AS NEEDED   · SARA Stockings; Status:Complete;   Done: 59EQC7624 11:12AM   · Follow-up visit in 2 months Evaluation and Treatment  Follow-up  Status: Hold For -    Scheduling  Requested for: 68BOP7484  Diabetic autonomic neuropathy associated with type 2 diabetes mellitus, Diabetic    neuropathy, type II diabetes mellitus, Diabetic peripheral neuropathy, Hyperlipidemia    · Blood Glucose- POC; Status:Resulted - Requires Verification,Retrospective By Protocol    Authorization;   Done: 48RPE3394 10:38AM  Fasting (Y/N) : Yes - Y    Discussion/Summary      Patient will continue with Lantus as prescribed as his sugars are improved  Chief Complaint   pt presents for a f/u post last week visit  pt reports feet still swollen  pt also having issues with some on his meds  History of Present Illness   Patient follow-up on diabetes hypertension along with lower extremity edema  Patient also with history of anxiety  Patient started escitalopram and had the diarrhea  Patient stopped medication after 1 dose  Patient to see Podiatry  They are recommending compression stocks or diuretic  No cp or sob at rest  No change in urination or defecation at this time  Blood sugars are better on Lantus  Patient's blood sugar this morning 130  Review of Systems        Constitutional: No fever or chills, feels well, no tiredness, no recent weight gain or weight loss-- and-- not feeling tired        Eyes: as noted in HPI       ENT: no complaints of earache, no hearing loss, no nosebleeds, no nasal discharge, no sore throat, no hoarseness  Cardiovascular: No complaints of slow heart rate, no fast heart rate, no chest pain, no palpitations, no leg claudication, no lower extremity  Respiratory: No complaints of shortness of breath, no wheezing, no cough, no SOB on exertion, no orthopnea or PND  Gastrointestinal: as noted in HPI  Genitourinary: No complaints of dysuria, no incontinence, no hesitancy, no nocturia, no genital lesion, no testicular pain  Musculoskeletal: as noted in HPI  Integumentary: No complaints of skin rash or skin lesions, no itching, no skin wound, no dry skin  Neurological: as noted in HPI  Psychiatric: Is not suicidal, no sleep disturbances, no anxiety or depression, no change in personality, no emotional problems  Endocrine: No complaints of proptosis, no hot flashes, no muscle weakness, no erectile dysfunction, no deepening of the voice, no feelings of weakness  Hematologic/Lymphatic: No complaints of swollen glands, no swollen glands in the neck, does not bleed easily, no easy bruising  Active Problems   1  Anxiety and depression (300 00,311) (F41 8)   2  Depression screening (V79 0) (Z13 89)   3  Diabetic autonomic neuropathy associated with type 2 diabetes mellitus (250 60,337 1)     (E11 43)   4  Diabetic neuropathy, type II diabetes mellitus (250 60,357 2) (E11 40)   5  Diabetic peripheral neuropathy (250 60,357 2) (E11 42)   6  Fractured rib (807 00) (S22 39XA)   7  Hyperlipidemia (272 4) (E78 5)   8  Hypotension (458 9) (I95 9)   9  Peripheral neuropathy (356 9) (G62 9)   10  Pressure ulcer of toe of left foot, unspecified ulcer stage (392 29,914 95) (L89 899)   11  Right shoulder pain (719 41) (M25 511)   12  Type 2 diabetes mellitus with diabetic autonomic neuropathy, with long-term current use      of insulin (250 60,337 1,V58 67) (E11 43,Z79 4)   13   Vitamin D deficiency (268 9) (E55 9)    Past Medical History   1  History of Diabetic autonomic neuropathy associated with type 2 diabetes mellitus     (250 60,337 1) (E11 43)     The active problems and past medical history were reviewed and updated today  Family History   Mother    1  No pertinent family history    Social History    · Former smoker (V15 82) (V09 062)   · No alcohol use    Current Meds    1  Atorvastatin Calcium 40 MG Oral Tablet; TAKE ONE TABLET BY MOUTH ONCE DAILY; Last Rx:03Nov2017 Ordered   2  Lantus SoloStar 100 UNIT/ML Subcutaneous Solution Pen-injector; Inject 50 units twice     daily; Therapy: 81UVT0845 to (Last Rx:29Dec2017)  Requested for: 55Tzt5183 Ordered   3  Metoclopramide HCl - 5 MG Oral Tablet; Take 1 tablet 4 times daily  Requested for:     90EUX2246; Last Rx:03Nov2017 Ordered   4  Midodrine HCl - 5 MG Oral Tablet; TAKE 1 TABLET 3 TIMES DAILY  Requested for:     46DBA3236; Last Rx:03Nov2017 Ordered   5  Ladonna-Rul Vitamin D 5000 UNIT Oral Tablet; Take 1 tablet daily; Therapy: (Recorded:56Jzl3056) to Recorded     The medication list was reviewed and updated today  Allergies   1  MetFORMIN HCl TABS    Vitals   Vital Signs    Recorded: 38YSC1160 54:98TD   Systolic 561, LUE, Sitting   Diastolic 72, LUE, Sitting   Height 6 ft 1 in   Weight 253 lb    BMI Calculated 33 38   BSA Calculated 2 38     Physical Exam        Constitutional      General appearance: No acute distress, well appearing and well nourished  Eyes      Conjunctiva and lids: No swelling, erythema, or discharge  Pupils and irises: Equal, round and reactive to light  Ears, Nose, Mouth, and Throat      External inspection of ears and nose: Normal        Otoscopic examination: Tympanic membrance translucent with normal light reflex  Canals patent without erythema  Nasal mucosa, septum, and turbinates: Normal without edema or erythema  Oropharynx: Normal with no erythema, edema, exudate or lesions  Pulmonary      Respiratory effort: No increased work of breathing or signs of respiratory distress  Auscultation of lungs: Clear to auscultation, equal breath sounds bilaterally, no wheezes, no rales, no rhonci  Cardiovascular      Palpation of heart: Normal PMI, no thrills  Auscultation of heart: Normal rate and rhythm, normal S1 and S2, without murmurs  Examination of extremities for edema and/or varicosities: Abnormal  -- 2/4 pitting edema bilaterally lower extremities  Carotid pulses: Normal        Lymphatic      Palpation of lymph nodes in neck: No lymphadenopathy  Musculoskeletal      Gait and station: Abnormal        Digits and nails: Normal without clubbing or cyanosis  Inspection/palpation of joints, bones, and muscles: Abnormal        Skin      Skin and subcutaneous tissue: Normal without rashes or lesions  Neurologic      Cranial nerves: Cranial nerves 2-12 intact  Reflexes: 2+ and symmetric  Sensation: Abnormal        Psychiatric      Orientation to person, place and time: Normal        Mood and affect: Normal           Results/Data   Blood Glucose- POC 02EBP3229 10:38AM Hussain Schneider      Test Name Result Flag Reference   Glucose Finger Stick 130                      Future Appointments      Date/Time Provider Specialty Site   06/28/2018 04:00 PM KEVIN Mcdermott   [address] [facility] [address] [address]     Signatures    Electronically signed by : Patricia Cook DO; Jan 11 2018 11:14AM EST                       (Author)

## 2018-01-13 VITALS
SYSTOLIC BLOOD PRESSURE: 82 MMHG | RESPIRATION RATE: 16 BRPM | DIASTOLIC BLOOD PRESSURE: 50 MMHG | HEIGHT: 73 IN | WEIGHT: 252 LBS | BODY MASS INDEX: 33.4 KG/M2 | HEART RATE: 78 BPM

## 2018-01-13 VITALS
BODY MASS INDEX: 33.53 KG/M2 | DIASTOLIC BLOOD PRESSURE: 50 MMHG | WEIGHT: 253 LBS | SYSTOLIC BLOOD PRESSURE: 72 MMHG | HEIGHT: 73 IN

## 2018-01-13 NOTE — MISCELLANEOUS
To whom it may concern,     My patient, Shay Duron DOS: 1960, recently was approved for assistance with Novolog 70/30 pens  The order for the insulin has been placed and the patient receved the shipment however  he recieved the vials rather than the pen in which was clearly noted on the order form  Jam Clark is stable on the pen  Due to his diabetic condition he has great difficulty with eyesight and feeling within his extremities  The patient requires and needs to  use the pen rather than the vials due to this fact  I feel it is medically necessary for the patient to recieve the pens rather than the vials due to his medical condition(s)  If you have any further questions or concerns, feel  free to contact my office  Thank you in advance,          LLOYD Reece            Electronically signed by:Stefany Gamez Post   Sep 13 2017 12:29PM EST Author

## 2018-01-14 VITALS
SYSTOLIC BLOOD PRESSURE: 114 MMHG | WEIGHT: 254 LBS | BODY MASS INDEX: 33.66 KG/M2 | HEIGHT: 73 IN | DIASTOLIC BLOOD PRESSURE: 68 MMHG

## 2018-01-15 VITALS
HEIGHT: 73 IN | SYSTOLIC BLOOD PRESSURE: 100 MMHG | WEIGHT: 262 LBS | DIASTOLIC BLOOD PRESSURE: 62 MMHG | BODY MASS INDEX: 34.72 KG/M2 | TEMPERATURE: 99 F

## 2018-01-18 ENCOUNTER — TRANSCRIBE ORDERS (OUTPATIENT)
Dept: ADMINISTRATIVE | Facility: HOSPITAL | Age: 58
End: 2018-01-18

## 2018-01-18 DIAGNOSIS — R60.0 LOCALIZED EDEMA: Primary | ICD-10-CM

## 2018-01-23 VITALS
SYSTOLIC BLOOD PRESSURE: 134 MMHG | DIASTOLIC BLOOD PRESSURE: 72 MMHG | WEIGHT: 253 LBS | BODY MASS INDEX: 33.53 KG/M2 | HEIGHT: 73 IN

## 2018-01-24 VITALS
DIASTOLIC BLOOD PRESSURE: 74 MMHG | OXYGEN SATURATION: 98 % | WEIGHT: 255.25 LBS | BODY MASS INDEX: 33.83 KG/M2 | SYSTOLIC BLOOD PRESSURE: 130 MMHG | TEMPERATURE: 96.8 F | HEIGHT: 73 IN | HEART RATE: 76 BPM

## 2018-01-25 ENCOUNTER — PATIENT OUTREACH (OUTPATIENT)
Dept: FAMILY MEDICINE CLINIC | Facility: CLINIC | Age: 58
End: 2018-01-25

## 2018-01-25 NOTE — PROGRESS NOTES
Wing Blinks started torsemide several days ago and feels his LE edema has improved slightly  He is scheduled to have an ultrasound tomorrow  He is watching his sodium intake  His FBS today was 138 and he also checks before dinner and his sugars range in the 150's  He had recent eye laser surgery and will be having cataract surgery in the future  His f/u appointments are scheduled

## 2018-01-26 ENCOUNTER — HOSPITAL ENCOUNTER (OUTPATIENT)
Dept: ULTRASOUND IMAGING | Facility: MEDICAL CENTER | Age: 58
Discharge: HOME | End: 2018-01-26
Payer: COMMERCIAL

## 2018-01-26 DIAGNOSIS — R60.0 LOCALIZED EDEMA: ICD-10-CM

## 2018-01-26 PROCEDURE — 93970 EXTREMITY STUDY: CPT

## 2018-01-26 PROCEDURE — 93970 EXTREMITY STUDY: CPT | Performed by: SURGERY

## 2018-01-29 ENCOUNTER — TELEPHONE (OUTPATIENT)
Dept: FAMILY MEDICINE CLINIC | Facility: CLINIC | Age: 58
End: 2018-01-29

## 2018-01-29 NOTE — TELEPHONE ENCOUNTER
----- Message from Eladia Calderon DO sent at 1/29/2018  9:26 AM EST -----  Call patient    Doppler studies of the lower extremities normal

## 2018-02-20 ENCOUNTER — OFFICE VISIT (OUTPATIENT)
Dept: FAMILY MEDICINE CLINIC | Facility: CLINIC | Age: 58
End: 2018-02-20
Payer: COMMERCIAL

## 2018-02-20 VITALS
BODY MASS INDEX: 51.16 KG/M2 | DIASTOLIC BLOOD PRESSURE: 80 MMHG | WEIGHT: 260.6 LBS | HEIGHT: 60 IN | SYSTOLIC BLOOD PRESSURE: 120 MMHG

## 2018-02-20 DIAGNOSIS — L97.421 DIABETIC ULCER OF LEFT HEEL ASSOCIATED WITH TYPE 2 DIABETES MELLITUS, LIMITED TO BREAKDOWN OF SKIN (HCC): ICD-10-CM

## 2018-02-20 DIAGNOSIS — E11.43 DIABETIC AUTONOMIC NEUROPATHY ASSOCIATED WITH TYPE 2 DIABETES MELLITUS (HCC): ICD-10-CM

## 2018-02-20 DIAGNOSIS — E78.2 MIXED HYPERLIPIDEMIA: ICD-10-CM

## 2018-02-20 DIAGNOSIS — Z79.4 TYPE 2 DIABETES MELLITUS WITH DIABETIC NEUROPATHY, WITH LONG-TERM CURRENT USE OF INSULIN (HCC): ICD-10-CM

## 2018-02-20 DIAGNOSIS — E11.40 TYPE 2 DIABETES MELLITUS WITH DIABETIC NEUROPATHY, WITH LONG-TERM CURRENT USE OF INSULIN (HCC): ICD-10-CM

## 2018-02-20 DIAGNOSIS — L97.421 DIABETIC ULCER OF LEFT HEEL ASSOCIATED WITH TYPE 2 DIABETES MELLITUS, LIMITED TO BREAKDOWN OF SKIN (HCC): Primary | ICD-10-CM

## 2018-02-20 DIAGNOSIS — E11.621 DIABETIC ULCER OF LEFT HEEL ASSOCIATED WITH TYPE 2 DIABETES MELLITUS, LIMITED TO BREAKDOWN OF SKIN (HCC): ICD-10-CM

## 2018-02-20 DIAGNOSIS — E11.621 DIABETIC ULCER OF LEFT HEEL ASSOCIATED WITH TYPE 2 DIABETES MELLITUS, LIMITED TO BREAKDOWN OF SKIN (HCC): Primary | ICD-10-CM

## 2018-02-20 PROBLEM — F32.A ANXIETY AND DEPRESSION: Status: ACTIVE | Noted: 2017-12-28

## 2018-02-20 PROBLEM — I10 BENIGN ESSENTIAL HTN: Status: ACTIVE | Noted: 2018-01-11

## 2018-02-20 PROBLEM — E11.42 DIABETIC PERIPHERAL NEUROPATHY (HCC): Status: ACTIVE | Noted: 2017-12-28

## 2018-02-20 PROBLEM — F41.9 ANXIETY AND DEPRESSION: Status: ACTIVE | Noted: 2017-12-28

## 2018-02-20 PROCEDURE — 99214 OFFICE O/P EST MOD 30 MIN: CPT | Performed by: FAMILY MEDICINE

## 2018-02-20 RX ORDER — INSULIN GLARGINE 100 [IU]/ML
80 INJECTION, SOLUTION SUBCUTANEOUS 2 TIMES DAILY
Qty: 45 PEN | Refills: 0 | Status: SHIPPED | OUTPATIENT
Start: 2018-02-20 | End: 2018-04-04 | Stop reason: HOSPADM

## 2018-02-20 RX ORDER — ATORVASTATIN CALCIUM 40 MG/1
40 TABLET, FILM COATED ORAL DAILY
Qty: 90 TABLET | Refills: 1 | Status: SHIPPED | OUTPATIENT
Start: 2018-02-20 | End: 2018-04-04 | Stop reason: HOSPADM

## 2018-02-20 RX ORDER — INSULIN GLARGINE 100 [IU]/ML
80 INJECTION, SOLUTION SUBCUTANEOUS 2 TIMES DAILY
Qty: 5 PEN | Refills: 5 | Status: SHIPPED | OUTPATIENT
Start: 2018-02-20 | End: 2018-02-20 | Stop reason: SDUPTHER

## 2018-02-20 RX ORDER — INSULIN GLARGINE 100 [IU]/ML
INJECTION, SOLUTION SUBCUTANEOUS
COMMUNITY
Start: 2018-01-29 | End: 2018-02-20 | Stop reason: SDUPTHER

## 2018-02-20 RX ORDER — TORSEMIDE 10 MG/1
TABLET ORAL
COMMUNITY
Start: 2018-01-18 | End: 2018-02-21 | Stop reason: SDUPTHER

## 2018-02-20 RX ORDER — INFLUENZA VIRUS VACCINE 15; 15; 15; 15 UG/.5ML; UG/.5ML; UG/.5ML; UG/.5ML
SUSPENSION INTRAMUSCULAR
COMMUNITY
Start: 2018-01-18 | End: 2018-03-14

## 2018-02-20 RX ORDER — MIDODRINE HYDROCHLORIDE 5 MG/1
5 TABLET ORAL 3 TIMES DAILY
Qty: 90 TABLET | Refills: 5 | Status: SHIPPED | OUTPATIENT
Start: 2018-02-20 | End: 2018-04-04 | Stop reason: HOSPADM

## 2018-02-20 NOTE — PROGRESS NOTES
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 mellitus, limited to breakdown of skin (HCC)  -     VAS lower limb arterial duplex, limited/unilateral; Future  -     silver sulfadiazine (SILVADENE,SSD) 1 % cream; Apply topically daily     Other orders  -     FLUARIX QUADRIVALENT 0 5 ML VICKIE;   -     LANTUS SOLOSTAR injection pen 100 units/mL;   -     torsemide (DEMADEX) 10 mg tablet;           Subjective:      Patient ID: Hardik Palomino is a 61 y o  male  Patient is here with open area on left heel for roughly 2 weeks  No significant bleeding noted  Patient also with some eschars of the 3rd and 4th toes on the left  Patient did see Podiatry proximally 1 month ago  No fevers noted  Medication Refill         The following portions of the patient's history were reviewed and updated as appropriate: allergies, current medications, past family history, past medical history, past social history, past surgical history and problem list     Review of Systems   Constitutional: Negative  HENT: Negative  Eyes: Negative  Respiratory: Negative  Cardiovascular: Negative  Gastrointestinal: Negative  Endocrine: Negative  Genitourinary: Negative  Musculoskeletal: Negative  Skin: Positive for wound  Allergic/Immunologic: Negative  Neurological: Negative  Hematological: Negative  Psychiatric/Behavioral: Negative            Objective:      /80 (BP Location: Left arm, Patient Position: Sitting, Cuff Size: Standard)   Ht 1' 10 25" (0 565 m)   Wt 118 kg (260 lb 9 6 oz)    16 kg/m²          Physical Exam   Skin:   2 x 3 cm opened callus with dry eschar underneath left heel  Patient with dry eschar distal portions of left 4th and 3rd toes  Nursing note and vitals reviewed

## 2018-02-20 NOTE — TELEPHONE ENCOUNTER
Patient states its cheaper to his lantus solostar [facility] with 45 pens  He is Requesting a call once it is approved

## 2018-02-21 DIAGNOSIS — R60.9 EDEMA, UNSPECIFIED TYPE: Primary | ICD-10-CM

## 2018-02-21 RX ORDER — TORSEMIDE 10 MG/1
10 TABLET ORAL DAILY
Qty: 30 TABLET | Refills: 0 | Status: SHIPPED | OUTPATIENT
Start: 2018-02-21 | End: 2018-05-23

## 2018-02-21 NOTE — TELEPHONE ENCOUNTER
Call patient  See if patient is taking another medication for cholesterol    If not, recommend patient taking atorvastatin as directed

## 2018-02-21 NOTE — TELEPHONE ENCOUNTER
Patient called asking why you prescribed him atorvastatin because he picked it up at the pharmacy and doesn't recall discussing this medication with you  Please address  He requests a call back from me

## 2018-03-06 ENCOUNTER — HOSPITAL ENCOUNTER (OUTPATIENT)
Dept: NON INVASIVE DIAGNOSTICS | Facility: CLINIC | Age: 58
Discharge: HOME | End: 2018-03-06
Payer: COMMERCIAL

## 2018-03-06 DIAGNOSIS — L97.421 DIABETIC ULCER OF LEFT HEEL ASSOCIATED WITH TYPE 2 DIABETES MELLITUS, LIMITED TO BREAKDOWN OF SKIN (HCC): ICD-10-CM

## 2018-03-06 DIAGNOSIS — E11.621 DIABETIC ULCER OF LEFT HEEL ASSOCIATED WITH TYPE 2 DIABETES MELLITUS, LIMITED TO BREAKDOWN OF SKIN (HCC): ICD-10-CM

## 2018-03-06 PROCEDURE — 93926 LOWER EXTREMITY STUDY: CPT | Performed by: SURGERY

## 2018-03-06 PROCEDURE — 93922 UPR/L XTREMITY ART 2 LEVELS: CPT | Performed by: SURGERY

## 2018-03-06 PROCEDURE — 93926 LOWER EXTREMITY STUDY: CPT

## 2018-03-14 ENCOUNTER — OFFICE VISIT (OUTPATIENT)
Dept: FAMILY MEDICINE CLINIC | Facility: CLINIC | Age: 58
End: 2018-03-14
Payer: COMMERCIAL

## 2018-03-14 ENCOUNTER — APPOINTMENT (EMERGENCY)
Dept: RADIOLOGY | Facility: HOSPITAL | Age: 58
DRG: 264 | End: 2018-03-14
Payer: COMMERCIAL

## 2018-03-14 ENCOUNTER — HOSPITAL ENCOUNTER (INPATIENT)
Facility: HOSPITAL | Age: 58
LOS: 9 days | DRG: 264 | End: 2018-03-23
Attending: EMERGENCY MEDICINE | Admitting: INTERNAL MEDICINE
Payer: COMMERCIAL

## 2018-03-14 VITALS
DIASTOLIC BLOOD PRESSURE: 70 MMHG | SYSTOLIC BLOOD PRESSURE: 110 MMHG | HEIGHT: 60 IN | BODY MASS INDEX: 51.68 KG/M2 | WEIGHT: 263.2 LBS

## 2018-03-14 DIAGNOSIS — I25.10 CORONARY ARTERY DISEASE: ICD-10-CM

## 2018-03-14 DIAGNOSIS — S91.302A NON-HEALING WOUND OF LEFT HEEL: Primary | ICD-10-CM

## 2018-03-14 DIAGNOSIS — R73.9 HYPERGLYCEMIA: ICD-10-CM

## 2018-03-14 DIAGNOSIS — R06.00 DYSPNEA: ICD-10-CM

## 2018-03-14 DIAGNOSIS — IMO0002 UNCONTROLLED DIABETES MELLITUS TYPE 2 WITH ATHEROSCLEROSIS OF ARTERIES OF EXTREMITIES: ICD-10-CM

## 2018-03-14 DIAGNOSIS — E11.40 TYPE 2 DIABETES MELLITUS WITH DIABETIC NEUROPATHY, WITH LONG-TERM CURRENT USE OF INSULIN (HCC): ICD-10-CM

## 2018-03-14 DIAGNOSIS — L97.421 DIABETIC ULCER OF LEFT HEEL ASSOCIATED WITH TYPE 2 DIABETES MELLITUS, LIMITED TO BREAKDOWN OF SKIN (HCC): ICD-10-CM

## 2018-03-14 DIAGNOSIS — E11.621 DIABETIC ULCER OF LEFT HEEL ASSOCIATED WITH TYPE 2 DIABETES MELLITUS, LIMITED TO BREAKDOWN OF SKIN (HCC): ICD-10-CM

## 2018-03-14 DIAGNOSIS — R77.8 ELEVATED TROPONIN: ICD-10-CM

## 2018-03-14 DIAGNOSIS — I73.9 PERIPHERAL VASCULAR DISEASE (HCC): ICD-10-CM

## 2018-03-14 DIAGNOSIS — Z79.4 TYPE 2 DIABETES MELLITUS WITH DIABETIC AUTONOMIC NEUROPATHY, WITH LONG-TERM CURRENT USE OF INSULIN (HCC): Primary | ICD-10-CM

## 2018-03-14 DIAGNOSIS — R94.31 T WAVE INVERSION IN EKG: ICD-10-CM

## 2018-03-14 DIAGNOSIS — E11.43 TYPE 2 DIABETES MELLITUS WITH DIABETIC AUTONOMIC NEUROPATHY, WITH LONG-TERM CURRENT USE OF INSULIN (HCC): Primary | ICD-10-CM

## 2018-03-14 DIAGNOSIS — Z79.4 TYPE 2 DIABETES MELLITUS WITH DIABETIC NEUROPATHY, WITH LONG-TERM CURRENT USE OF INSULIN (HCC): ICD-10-CM

## 2018-03-14 DIAGNOSIS — I96 GANGRENE (HCC): ICD-10-CM

## 2018-03-14 PROBLEM — R79.89 ELEVATED TROPONIN: Status: ACTIVE | Noted: 2018-03-14

## 2018-03-14 LAB
ALBUMIN SERPL BCP-MCNC: 2.8 G/DL (ref 3.5–5)
ALP SERPL-CCNC: 59 U/L (ref 46–116)
ALT SERPL W P-5'-P-CCNC: 21 U/L (ref 12–78)
ANION GAP SERPL CALCULATED.3IONS-SCNC: 9 MMOL/L (ref 4–13)
APTT PPP: 30 SECONDS (ref 23–35)
AST SERPL W P-5'-P-CCNC: 26 U/L (ref 5–45)
ATRIAL RATE: 84 BPM
BASOPHILS # BLD AUTO: 0.03 THOUSANDS/ΜL (ref 0–0.1)
BASOPHILS NFR BLD AUTO: 0 % (ref 0–1)
BILIRUB SERPL-MCNC: 0.4 MG/DL (ref 0.2–1)
BUN SERPL-MCNC: 14 MG/DL (ref 5–25)
CALCIUM SERPL-MCNC: 9 MG/DL (ref 8.3–10.1)
CHLORIDE SERPL-SCNC: 97 MMOL/L (ref 100–108)
CO2 SERPL-SCNC: 29 MMOL/L (ref 21–32)
CREAT SERPL-MCNC: 1.17 MG/DL (ref 0.6–1.3)
CRP SERPL QL: 79.5 MG/L
EOSINOPHIL # BLD AUTO: 0.22 THOUSAND/ΜL (ref 0–0.61)
EOSINOPHIL NFR BLD AUTO: 2 % (ref 0–6)
ERYTHROCYTE [DISTWIDTH] IN BLOOD BY AUTOMATED COUNT: 12.7 % (ref 11.6–15.1)
ERYTHROCYTE [SEDIMENTATION RATE] IN BLOOD: 100 MM/HOUR (ref 0–10)
EST. AVERAGE GLUCOSE BLD GHB EST-MCNC: 223 MG/DL
GFR SERPL CREATININE-BSD FRML MDRD: 68 ML/MIN/1.73SQ M
GLUCOSE SERPL-MCNC: 126 MG/DL (ref 65–140)
GLUCOSE SERPL-MCNC: 163 MG/DL (ref 65–140)
GLUCOSE SERPL-MCNC: 200 MG/DL (ref 65–140)
GLUCOSE SERPL-MCNC: 204 MG/DL (ref 65–140)
HBA1C MFR BLD: 9.4 % (ref 4.2–6.3)
HCT VFR BLD AUTO: 37.9 % (ref 36.5–49.3)
HGB BLD-MCNC: 12.4 G/DL (ref 12–17)
INR PPP: 1.06 (ref 0.86–1.16)
LYMPHOCYTES # BLD AUTO: 1.75 THOUSANDS/ΜL (ref 0.6–4.47)
LYMPHOCYTES NFR BLD AUTO: 14 % (ref 14–44)
MCH RBC QN AUTO: 27.7 PG (ref 26.8–34.3)
MCHC RBC AUTO-ENTMCNC: 32.7 G/DL (ref 31.4–37.4)
MCV RBC AUTO: 85 FL (ref 82–98)
MONOCYTES # BLD AUTO: 0.55 THOUSAND/ΜL (ref 0.17–1.22)
MONOCYTES NFR BLD AUTO: 5 % (ref 4–12)
NEUTROPHILS # BLD AUTO: 9.6 THOUSANDS/ΜL (ref 1.85–7.62)
NEUTS SEG NFR BLD AUTO: 79 % (ref 43–75)
NRBC BLD AUTO-RTO: 0 /100 WBCS
NT-PROBNP SERPL-MCNC: 527 PG/ML
P AXIS: 60 DEGREES
PLATELET # BLD AUTO: 267 THOUSANDS/UL (ref 149–390)
PLATELET # BLD AUTO: 283 THOUSANDS/UL (ref 149–390)
PMV BLD AUTO: 10 FL (ref 8.9–12.7)
PMV BLD AUTO: 9.7 FL (ref 8.9–12.7)
POTASSIUM SERPL-SCNC: 4.1 MMOL/L (ref 3.5–5.3)
PR INTERVAL: 138 MS
PROT SERPL-MCNC: 8.2 G/DL (ref 6.4–8.2)
PROTHROMBIN TIME: 13.8 SECONDS (ref 12.1–14.4)
QRS AXIS: -11 DEGREES
QRSD INTERVAL: 90 MS
QT INTERVAL: 362 MS
QTC INTERVAL: 427 MS
RBC # BLD AUTO: 4.47 MILLION/UL (ref 3.88–5.62)
SODIUM SERPL-SCNC: 135 MMOL/L (ref 136–145)
T WAVE AXIS: 138 DEGREES
TROPONIN I SERPL-MCNC: 0.32 NG/ML
TROPONIN I SERPL-MCNC: 0.43 NG/ML
TROPONIN I SERPL-MCNC: 0.56 NG/ML
VENTRICULAR RATE: 84 BPM
WBC # BLD AUTO: 12.15 THOUSAND/UL (ref 4.31–10.16)

## 2018-03-14 PROCEDURE — 85049 AUTOMATED PLATELET COUNT: CPT | Performed by: INTERNAL MEDICINE

## 2018-03-14 PROCEDURE — 36415 COLL VENOUS BLD VENIPUNCTURE: CPT | Performed by: EMERGENCY MEDICINE

## 2018-03-14 PROCEDURE — 73630 X-RAY EXAM OF FOOT: CPT

## 2018-03-14 PROCEDURE — 99222 1ST HOSP IP/OBS MODERATE 55: CPT | Performed by: INTERNAL MEDICINE

## 2018-03-14 PROCEDURE — 83036 HEMOGLOBIN GLYCOSYLATED A1C: CPT | Performed by: INTERNAL MEDICINE

## 2018-03-14 PROCEDURE — 85730 THROMBOPLASTIN TIME PARTIAL: CPT | Performed by: EMERGENCY MEDICINE

## 2018-03-14 PROCEDURE — 85610 PROTHROMBIN TIME: CPT | Performed by: EMERGENCY MEDICINE

## 2018-03-14 PROCEDURE — 82948 REAGENT STRIP/BLOOD GLUCOSE: CPT

## 2018-03-14 PROCEDURE — 99284 EMERGENCY DEPT VISIT MOD MDM: CPT

## 2018-03-14 PROCEDURE — 84484 ASSAY OF TROPONIN QUANT: CPT | Performed by: INTERNAL MEDICINE

## 2018-03-14 PROCEDURE — 93005 ELECTROCARDIOGRAM TRACING: CPT

## 2018-03-14 PROCEDURE — 99214 OFFICE O/P EST MOD 30 MIN: CPT | Performed by: FAMILY MEDICINE

## 2018-03-14 PROCEDURE — 84484 ASSAY OF TROPONIN QUANT: CPT | Performed by: EMERGENCY MEDICINE

## 2018-03-14 PROCEDURE — 85025 COMPLETE CBC W/AUTO DIFF WBC: CPT | Performed by: EMERGENCY MEDICINE

## 2018-03-14 PROCEDURE — 93010 ELECTROCARDIOGRAM REPORT: CPT | Performed by: INTERNAL MEDICINE

## 2018-03-14 PROCEDURE — 86140 C-REACTIVE PROTEIN: CPT | Performed by: EMERGENCY MEDICINE

## 2018-03-14 PROCEDURE — 85652 RBC SED RATE AUTOMATED: CPT | Performed by: EMERGENCY MEDICINE

## 2018-03-14 PROCEDURE — 80053 COMPREHEN METABOLIC PANEL: CPT | Performed by: EMERGENCY MEDICINE

## 2018-03-14 PROCEDURE — 83880 ASSAY OF NATRIURETIC PEPTIDE: CPT | Performed by: EMERGENCY MEDICINE

## 2018-03-14 RX ORDER — MORPHINE SULFATE 2 MG/ML
2 INJECTION, SOLUTION INTRAMUSCULAR; INTRAVENOUS EVERY 4 HOURS PRN
Status: DISCONTINUED | OUTPATIENT
Start: 2018-03-14 | End: 2018-03-23 | Stop reason: HOSPADM

## 2018-03-14 RX ORDER — ONDANSETRON 2 MG/ML
4 INJECTION INTRAMUSCULAR; INTRAVENOUS EVERY 6 HOURS PRN
Status: DISCONTINUED | OUTPATIENT
Start: 2018-03-14 | End: 2018-03-23 | Stop reason: HOSPADM

## 2018-03-14 RX ORDER — INSULIN GLARGINE 100 [IU]/ML
70 INJECTION, SOLUTION SUBCUTANEOUS
Status: DISCONTINUED | OUTPATIENT
Start: 2018-03-14 | End: 2018-03-22

## 2018-03-14 RX ORDER — ASPIRIN 325 MG
325 TABLET, DELAYED RELEASE (ENTERIC COATED) ORAL DAILY
Status: DISCONTINUED | OUTPATIENT
Start: 2018-03-15 | End: 2018-03-23 | Stop reason: HOSPADM

## 2018-03-14 RX ORDER — SILVER SULFADIAZINE 1 %
CREAM (GRAM) TOPICAL
COMMUNITY
Start: 2018-02-20 | End: 2018-03-14

## 2018-03-14 RX ORDER — MIDODRINE HYDROCHLORIDE 5 MG/1
5 TABLET ORAL 3 TIMES DAILY
Status: DISCONTINUED | OUTPATIENT
Start: 2018-03-14 | End: 2018-03-15

## 2018-03-14 RX ORDER — ACETAMINOPHEN 325 MG/1
650 TABLET ORAL EVERY 6 HOURS PRN
Status: DISCONTINUED | OUTPATIENT
Start: 2018-03-14 | End: 2018-03-23 | Stop reason: HOSPADM

## 2018-03-14 RX ORDER — METOCLOPRAMIDE 10 MG/1
5 TABLET ORAL DAILY
Status: DISCONTINUED | OUTPATIENT
Start: 2018-03-15 | End: 2018-03-23 | Stop reason: HOSPADM

## 2018-03-14 RX ORDER — LORAZEPAM 0.5 MG/1
0.5 TABLET ORAL 2 TIMES DAILY PRN
Status: DISCONTINUED | OUTPATIENT
Start: 2018-03-14 | End: 2018-03-23 | Stop reason: HOSPADM

## 2018-03-14 RX ORDER — LORAZEPAM 0.5 MG/1
TABLET ORAL EVERY 8 HOURS PRN
COMMUNITY
End: 2018-04-04 | Stop reason: HOSPADM

## 2018-03-14 RX ORDER — ATORVASTATIN CALCIUM 40 MG/1
40 TABLET, FILM COATED ORAL DAILY
Status: DISCONTINUED | OUTPATIENT
Start: 2018-03-15 | End: 2018-03-23 | Stop reason: HOSPADM

## 2018-03-14 RX ADMIN — CEFTRIAXONE 1000 MG: 1 INJECTION, SOLUTION INTRAVENOUS at 20:17

## 2018-03-14 RX ADMIN — METRONIDAZOLE 500 MG: 500 INJECTION, SOLUTION INTRAVENOUS at 18:46

## 2018-03-14 RX ADMIN — MIDODRINE HYDROCHLORIDE 5 MG: 5 TABLET ORAL at 21:16

## 2018-03-14 RX ADMIN — INSULIN GLARGINE 70 UNITS: 100 INJECTION, SOLUTION SUBCUTANEOUS at 21:17

## 2018-03-14 NOTE — H&P
History and Physical - [facility] Internal Medicine    Patient Information: Claude Burnham 61 y o  male MRN: [id number]  Unit/Bed#: E4 -01 Encounter: [id number]  Admitting Physician: Ami Cruz MD  PCP: Isabel Flores DO  Date of Admission:  03/14/18    Assessment/Plan:    Hospital Problem List:     Principal Problem:    Non-healing wound of left heel  Active Problems:    Anxiety and depression    Benign essential HTN    Diabetic autonomic neuropathy associated with type 2 diabetes mellitus ([facility] )    Diabetic neuropathy, type II diabetes mellitus ([facility] )    Diabetic peripheral neuropathy (HCC)    Hyperlipidemia    Peripheral vascular disease (HCC)    Dyspnea    Hyperglycemia    Elevated troponin    T wave inversion in EKG      1 )  Left foot infection  -patient has an ongoing left heel infection for the past 1 month which is progressively worsened the past 2 weeks with eschar and gangrenous formation  -patient had lower extremity Dopplers which were negative for DVT  -patient also had lower extremity arterial Dopplers on 03/06/18 which revealed diffuse atherosclerotic disease noted throughout the femoral popliteal arteries suggested of tibioperoneal disease  -patient has palpable pulses in bilateral lower extremities and skin is warm to touch  -there is concern of gangrenous formation of the heel and also the tips of the left foot  -x-ray is negative for any osteomyelitis, , CRP 79 5  -podiatry consult will be appreciated  -vascular consult will be appreciated  -further intervention forefoot will need to be discussed with Cardiology and further evaluated due to his chest pain and elevated troponin, at this point patient would not be a surgical candidate until cardiac status is stable  -will start patient on empiric antibiotics with mild leukocytosis, ceftriaxone and Flagyl    2 )  Chest pain  -rule out ACS patient has significant risk factors including age, diabetes with multiple complications  -1st set of troponin 0 56, will get serial troponin and EKG  -telemetry monitoring   -aspirin, statin  -morphine as needed for pain  -will hold any beta blocker at this point given patient has history of diabetic autonomic neuropathy with history of hypotension and requiring midodrine  -cardiology consult will be appreciated    3 )  Diabetes mellitus  -patient takes Lantus 70 units b i d  will continue this regimen, monitor Accu-Cheks and sliding scale for coverage    4 )  Gastroparesis  -continue with Reglan, Zofran as needed    5 )  Diabetic autonomic neuropathy  -continue with midodrine    6 )  Rule out new onset heart failure  -given patient's lower extremity edema, orthopnea, PND and intermittent chest pain there is concern of underlying cardiomyopathy whether this is ischemic or nonischemic  -BNP is slightly elevated at 527, troponin 0 56  -will get 2D echo for further evaluation    VTE Prophylaxis: Enoxaparin (Lovenox)  / sequential compression device   Code Status:  Full  POLST: There is no POLST form on file for this patient (pre-hospital)    Anticipated Length of Stay:  Patient will be admitted on an Inpatient basis with an anticipated length of stay of  greater than 2 midnights  Justification for Hospital Stay:  Chest pain, left lower extremity ulcer    Total Time for Visit, including Counseling / Coordination of Care: 30 minutes  Greater than 50% of this total time spent on direct patient counseling and coordination of care  Chief Complaint:   Lower extremity wound, chest pain    History of Present Illness:    Ashlyn Wise is a 61 y o  male who presents with evaluation of left lower extremity wound and chest pain  Patient has medical history significant for diabetes mellitus, hyperlipidemia, hypothyroidism, gastroparesis, diabetic autonomic neuropathy, diabetic retinopathy, peripheral neuropathy, patient lives at home with his wife and uses a walker to walk    Patient states he uses a walker due to his peripheral neuropathy  Patient states that about 1 month ago he began having a wound to his left lower extremity unsure how it started  Patient has been following his PCP and has progressively worsened  Patient denies any fever, chills, nausea, vomiting, diarrhea, abdominal pain  PCP was concerned and patient was sent for further evaluation in the Ed  Patient is also complaining of having 6 month history of intermittent chest pain, described as 5/10 on pain scale, localized to the left/mid sternal region, described as pressure-like in nature  Pain is worse with exertion and lying flat  Patient also had associated dyspnea, orthopnea and PND  Patient states he has to sleep upright in a reclining position  He denies any palpitations or diaphoresis  Patient had an echocardiogram on 02/28/2017 at [facility] which revealed ejection fraction of 60% and no regional wall motion abnormalities  Patient denies any history of any CAD  Review of Systems:    Review of Systems   Constitutional: Negative  HENT: Negative  Eyes: Negative  Respiratory: Positive for shortness of breath  Cardiovascular: Positive for chest pain and leg swelling  Gastrointestinal: Negative  Endocrine: Negative  Genitourinary: Negative  Musculoskeletal: Negative  Skin: Positive for wound  Allergic/Immunologic: Negative  Neurological: Negative  Hematological: Negative  Psychiatric/Behavioral: Negative  Past Medical and Surgical History:     Past Medical History:   Diagnosis Date    Anemia     Autonomic neuropathy     Diabetes mellitus ([facility] )     Diabetic autonomic neuropathy associated with type 2 diabetes mellitus ([facility] )     Last Assessed: 12/28/2017    Orthostatic hypotension        No past surgical history on file  Meds/Allergies:    Prior to Admission medications    Medication Sig Start Date End Date Taking?  Authorizing Provider   atorvastatin (LIPITOR) 40 mg tablet Take 1 tablet (40 mg total) by mouth daily 2/20/18  Yes Celi Grace DO   insulin aspart protamine-insulin aspart (NOVOLOG MIX 70/30 FLEXPEN) 100 Units/mL SUPN Inject under the skin Twice daily 2/11/15  Yes Historical Provider, MD [facility] injection pen 100 units/mL Inject 0 8 mL (80 Units total) under the skin 2 (two) times a day 2/20/18  Yes Celi Grace DO   LORazepam (ATIVAN) 0 5 mg tablet Take by mouth every 8 (eight) hours as needed for anxiety   Yes Historical Provider, MD   metoclopramide (REGLAN) 5 mg tablet Take 5 mg by mouth daily     Yes Historical Provider, MD   midodrine (PROAMATINE) 5 mg tablet Take 1 tablet (5 mg total) by mouth 3 (three) times a day 2/20/18  Yes Celi Grace DO   torsemide [facility]) 10 mg tablet Take 1 tablet (10 mg total) by mouth daily 2/21/18  Yes Celi Grace DO   FLUARIX QUADRIVALENT 0 5 ML VICKIE  1/18/18 3/14/18  Historical Provider, MD   SSD 1 % cream  2/20/18 3/14/18  Historical Provider, MD     I have reviewed home medications with patient personally  Allergies: Allergies   Allergen Reactions    Metformin        Social History:     History   Alcohol Use No     History   Smoking Status    Never Smoker   Smokeless Tobacco    Never Used     Comment: Former smoker per [employer]     History   Drug Use No       Family History:    non-contributory    Physical Exam:     Vitals:   Blood Pressure: 138/73 (03/14/18 1704)  Pulse: 90 (03/14/18 1704)  Temperature: 98 1 °F (36 7 °C) (03/14/18 1704)  Temp Source: Tympanic (03/14/18 1704)  Respirations: 20 (03/14/18 1704)  Weight - Scale: 117 kg (257 lb 4 4 oz) (03/14/18 1704)  SpO2: 100 % (03/14/18 1704)    Constitutional: Patient is oriented to person, place and time, no acute distress  HEENT:  Normocephalic, atraumatic, EOMI, PERRLA, no scleral icterus, no pallor, moist oral mucosa  Neck:  Supple, no masses, no thyromegaly, no bruits Normal range of motion     Lymph nodes:  No lymphadenopathy  Cardiovascular: Normal S1S2, RRR, No murmurs/rubs/gallops appreciated  Pulmonary: Clear to auscultation bilaterally, No rhonchi/rales/wheezing appreciated  Abdominal: Soft, Bowel sounds present, Non-tender, Non-distended, No rebound/guarding, no hepatomegaly   Musculoskeletal: No tenderness/abnormality   Extremities:  No cyanosis, clubbing or edema  Bilateral pedal and tibial pulses are palpable and equal   Neurological: Cranial nerves II-XII grossly intact, sensation intact, otherwise no focal neurological symptoms  Skin:  Left foot:  Large, dry, heel ulcer with overlying eschar and gangrene, grade units changes to the tips of 3rd and 4th left foot toes    Additional Data:     Lab Results: I have personally reviewed pertinent reports  Results from last 7 days  Lab Units 03/14/18  1450   WBC Thousand/uL 12 15*   HEMOGLOBIN g/dL 12 4   HEMATOCRIT % 37 9   PLATELETS Thousands/uL 267   NEUTROS PCT % 79*   LYMPHS PCT % 14   MONOS PCT % 5   EOS PCT % 2       Results from last 7 days  Lab Units 03/14/18  1450   SODIUM mmol/L 135*   POTASSIUM mmol/L 4 1   CHLORIDE mmol/L 97*   CO2 mmol/L 29   BUN mg/dL 14   CREATININE mg/dL 1 17   CALCIUM mg/dL 9 0   TOTAL PROTEIN g/dL 8 2   BILIRUBIN TOTAL mg/dL 0 40   ALK PHOS U/L 59   ALT U/L 21   AST U/L 26   GLUCOSE RANDOM mg/dL 204*       Results from last 7 days  Lab Units 03/14/18  1450   INR  1 06       Imaging: I have personally reviewed pertinent reports  Xr Foot 3+ Views Left    Result Date: 3/14/2018  Narrative: LEFT FOOT INDICATION:   Left heel gangrenous wound, possible Osteo  COMPARISON:  None  VIEWS:  XR FOOT 3+ VW LEFT FINDINGS: There is no acute fracture or dislocation  No significant degenerative changes  There is posterior calcaneal bone spur formation  No lytic or blastic lesions seen  There is no soft tissue gas  There is no osteomyelitis identified  There is prominent arterial calcification in the ankle and foot  There is swelling over the dorsal aspect of the foot       Impression: No osteomyelitis identified  Soft tissue swelling  Atherosclerosis  Posterior calcaneal bone spur formation  Workstation performed: [id number]     Vas Lower Limb Arterial Duplex, Limited/unilateral    Result Date: 3/6/2018  Narrative:  [facility] REPORT [facility]INICAL: Indications:  Type 2 diabetes mellitus with foot ulcer [E11 621]  Left heel ulceration  Risk Factors: The patient has history of Hyperlipidemia, Hypertension, previous remote smoking, and Diabetes  Right Brachial Pressure:  144/ mm Hg, Left Brachial Pressure:  133/ mm Hg  FINDINGS:  Left                   PSV  EDV  Common Femoral Artery  153   23  Prox Profunda          140   26  Prox SFA               159   33  Mid SFA                125   25  Dist SFA               114   25  Proximal Pop           140   28  Distal Pop             161   39  Dist Post Tibial        45   24  Dist  Ant  Tibial       78   29     CONCLUSION: Impression: Right Lower Limb Ankle Pressure: 188  mm Hg ,  YUDITH: 1 31, normal range  Right Metatarsal Pressure: 172  mm Hg  Right Great Toe Pressure 108  mm Hg ,  above healing threshold for a diabetic  Left Lower Limb Ankle Pressure 245 mm Hg , YUDITH: 1 7, supra normal, consistent with poorly compressible vessels  Left Metatarsal Pressure: 103  mm Hg  Left Great Toe Pressure 51  mm Hg , above healing threshold for a diabetic  Conclusion: Diffuse atherosclerotic disease noted throughout the femoropopliteal arteries  Findings suggests tibioperoneal disease  SIGNATURE: Electronically Signed by: Hermilo Bishop MD, [address] on 2018-03-06 03:55:50 PM      EKG, Pathology, and Other Studies Reviewed on Admission:   · EKG:  Sinus rhythm at 84 beats per minute with T-wave inversion in 1, aVL, V5 V6 with mild ST depressions in these leads, no ST elevation    Allscripts / Epic Records Reviewed: Yes     ** Please Note: This note has been constructed using a voice recognition system   **

## 2018-03-14 NOTE — PROGRESS NOTES
Assessment/Plan:  Arterial Doppler discussed with the patient  Due to larger area of necrosis of left foot and possible osteomyelitis patient will go to ER for admission  Patient will need to see vascular surgeon as well as wound care specialist while  hospitalized  No problem-specific Assessment & Plan notes found for this encounter  Diagnoses and all orders for this visit:    Type 2 diabetes mellitus with diabetic autonomic neuropathy, with long-term current use of insulin ([address] )    Peripheral vascular disease ([facility] )  -     Ambulatory referral to Vascular Surgery; Future    Type 2 diabetes mellitus with diabetic neuropathy, with long-term current use of insulin (HCC)    Gangrene (HCC)    Other orders  -     SSD 1 % cream;           Subjective:      Patient ID: Valerie Parra is a 61 y o  male  Patient is here to follow-up on diabetic ulcer left heel  The following portions of the patient's history were reviewed and updated as appropriate: allergies, current medications, past family history, past medical history, past social history, past surgical history and problem list     Review of Systems   Constitutional: Negative  HENT: Negative  Eyes: Negative  Respiratory: Negative  Cardiovascular: Negative  Gastrointestinal: Negative  Endocrine: Negative  Genitourinary: Negative  Musculoskeletal: Negative  Skin: Positive for wound  Allergic/Immunologic: Negative  Neurological: Negative  Hematological: Negative  Psychiatric/Behavioral: Negative  Objective:      /70 (BP Location: Left arm, Patient Position: Sitting, Cuff Size: Standard)   Ht (!) 6 78" (0 172 m)   Wt 119 kg (263 lb 3 2 oz)   BMI 4025 59 kg/m²          Physical Exam   Cardiovascular: Normal rate and regular rhythm  Pulmonary/Chest: Effort normal and breath sounds normal    Skin:   Large area of necrosis of heel which is worsened since last being seen

## 2018-03-14 NOTE — ED PROVIDER NOTES
History  Chief Complaint   Patient presents with    Wound Check     Reportsto have gangerous wound on L heal area; sent into ED by PCP  History provided by:  Patient   used: No    Wound Check    Treated in ED: being seen at PCP office  Treatments since wound repair include a wound recheck  Fever duration: no fever  There has been no drainage from the wound  There is no pain present  There is difficulty moving the extremity or digit due to weakness  Shortness of Breath   Severity:  Moderate  Onset quality:  Gradual  Duration:  1 month  Timing:  Intermittent  Progression:  Worsening  Chronicity:  New  Context: activity    Relieved by:  Nothing  Worsened by:  Nothing  Ineffective treatments:  None tried  Associated symptoms: PND    Associated symptoms: no abdominal pain, no chest pain, no cough, no fever, no headaches, no neck pain, no rash, no sore throat and no vomiting    Risk factors: obesity    Risk factors comment:  DM      Prior to Admission Medications   Prescriptions Last Dose Informant Patient Reported? Taking?    LANTUS SOLOSTAR injection pen 100 units/mL   No Yes   Sig: Inject 0 8 mL (80 Units total) under the skin 2 (two) times a day   LORazepam (ATIVAN) 0 5 mg tablet   Yes Yes   Sig: Take by mouth every 8 (eight) hours as needed for anxiety   atorvastatin (LIPITOR) 40 mg tablet   No Yes   Sig: Take 1 tablet (40 mg total) by mouth daily   insulin aspart protamine-insulin aspart (NOVOLOG MIX 70/30 FLEXPEN) 100 Units/mL SUPN   Yes Yes   Sig: Inject under the skin Twice daily   metoclopramide (REGLAN) 5 mg tablet   Yes Yes   Sig: Take 5 mg by mouth daily     midodrine (PROAMATINE) 5 mg tablet   No Yes   Sig: Take 1 tablet (5 mg total) by mouth 3 (three) times a day   torsemide (DEMADEX) 10 mg tablet   No Yes   Sig: Take 1 tablet (10 mg total) by mouth daily      Facility-Administered Medications Last Administration Doses Remaining   silver sulfadiazine (SILVADENE,SSD) 1 % cream None recorded           Past Medical History:   Diagnosis Date    Anemia     Autonomic neuropathy     [facility] ([facility] )     Diabetic autonomic neuropathy associated with type 2 diabetes mellitus ([facility] )     Last Assessed: 12/28/2017    Orthostatic hypotension        No past surgical history on file  Family History   Problem Relation Age of Onset    No Known Problems Mother      I have reviewed and agree with the history as documented  Social History   Substance Use Topics    Smoking status: Never Smoker    Smokeless tobacco: Never Used      Comment: Former smoker per [employer]    Alcohol use No        Review of Systems   Constitutional: Positive for fatigue  Negative for activity change, chills and fever  HENT: Negative for facial swelling, sore throat and trouble swallowing  Eyes: Negative for pain and visual disturbance  Respiratory: Positive for shortness of breath  Negative for cough and chest tightness  Cardiovascular: Positive for PND  Negative for chest pain and leg swelling  Gastrointestinal: Negative for abdominal pain, blood in stool, diarrhea, nausea and vomiting  Genitourinary: Negative for dysuria and flank pain  Musculoskeletal: Negative for back pain, neck pain and neck stiffness  Skin: Negative for pallor and rash  Allergic/Immunologic: Negative for environmental allergies and immunocompromised state  Neurological: Negative for dizziness and headaches  Hematological: Negative for adenopathy  Does not bruise/bleed easily  Psychiatric/Behavioral: Negative for agitation and behavioral problems  All other systems reviewed and are negative        Physical Exam  ED Triage Vitals   Temperature Pulse Respirations Blood Pressure SpO2   03/14/18 1407 03/14/18 1407 03/14/18 1407 03/14/18 1407 03/14/18 1409   98 2 °F (36 8 °C) 85 18 107/59 96 %      Temp Source Heart Rate Source Patient Position - Orthostatic VS BP Location FiO2 (%)   03/14/18 1407 03/14/18 1511 03/14/18 1511 03/14/18 1511 --   Oral Monitor Sitting Right arm       Pain Score       03/14/18 1407       9           Orthostatic Vital Signs  Vitals:    03/14/18 1407 03/14/18 1511 03/14/18 1557   BP: 107/59 136/64 (!) 181/79   Pulse: 85 89 90   Patient Position - Orthostatic VS:  Sitting Sitting       Physical Exam   Constitutional: He is oriented to person, place, and time  He appears well-developed and well-nourished  No distress  HENT:   Head: Normocephalic and atraumatic  Eyes: EOM are normal    Neck: Normal range of motion  Neck supple  Cardiovascular: Normal rate, regular rhythm, normal heart sounds and intact distal pulses  Pulmonary/Chest: Effort normal and breath sounds normal    Abdominal: Soft  Bowel sounds are normal  There is no tenderness  There is no rebound and no guarding  Musculoskeletal: Normal range of motion  Large dry, blackish, gangrenous wound at left heel   Neurological: He is alert and oriented to person, place, and time  Skin: Skin is warm and dry  Psychiatric: He has a normal mood and affect  Nursing note and vitals reviewed        ED Medications  Medications - No data to display    Diagnostic Studies  Results Reviewed     Procedure Component Value Units Date/Time    Sedimentation rate, automated [[id number]]  (Abnormal) Collected:  03/14/18 1450    Lab Status:  Final result Specimen:  Blood from Arm, Right Updated:  03/14/18 1600     Sed Rate 100 (H) mm/hour     C-reactive protein [[id number]]  (Abnormal) Collected:  03/14/18 1450    Lab Status:  Final result Specimen:  Blood from Arm, Right Updated:  03/14/18 1544     CRP 79 5 (H) mg/L     B-type natriuretic peptide [[id number]]  (Abnormal) Collected:  03/14/18 1450    Lab Status:  Final result Specimen:  Blood from Arm, Right Updated:  03/14/18 1519     NT-proBNP 527 (H) pg/mL     Comprehensive metabolic panel [[id number]]  (Abnormal) Collected:  03/14/18 1450    Lab Status:  Final result Specimen:  Blood from Arm, Right Updated:  03/14/18 1519     Sodium 135 (L) mmol/L      Potassium 4 1 mmol/L      Chloride 97 (L) mmol/L      CO2 29 mmol/L      Anion Gap 9 mmol/L      BUN 14 mg/dL      Creatinine 1 17 mg/dL      Glucose 204 (H) mg/dL      Calcium 9 0 mg/dL      AST 26 U/L      ALT 21 U/L      Alkaline Phosphatase 59 U/L      Total Protein 8 2 g/dL      Albumin 2 8 (L) g/dL      Total Bilirubin 0 40 mg/dL      eGFR 68 ml/min/1 73sq m     Narrative:         National Kidney Disease Education Program recommendations are as follows:  GFR calculation is accurate only with a steady state creatinine  Chronic Kidney disease less than 60 ml/min/1 73 sq  meters  Kidney failure less than 15 ml/min/1 73 sq  meters  Troponin I [[id number]]  (Abnormal) Collected:  03/14/18 1450    Lab Status:  Final result Specimen:  Blood from Arm, Right Updated:  03/14/18 1513     Troponin I 0 56 (H) ng/mL     Narrative:         [employer] Chemistry analyzer 99% cutoff is > 0 04 ng/mL in network labs    o cTnI 99% cutoff is useful only when applied to patients in the clinical setting of myocardial ischemia  o cTnI 99% cutoff should be interpreted in the context of clinical history, ECG findings and possibly cardiac imaging to establish correct diagnosis  o cTnI 99% cutoff may be suggestive but clearly not indicative of a coronary event without the clinical setting of myocardial ischemia  Louis Clark [[id number]]  (Normal) Collected:  03/14/18 1450    Lab Status:  Final result Specimen:  Blood from Arm, Right Updated:  03/14/18 1508     Protime 13 8 seconds      INR 1 06    APTT [[id number]]  (Normal) Collected:  03/14/18 1450    Lab Status:  Final result Specimen:  Blood from Arm, Right Updated:  03/14/18 1508     PTT 30 seconds     Narrative:          Therapeutic Heparin Range = 60-90 seconds    CBC and differential [[id number]]  (Abnormal) Collected:  03/14/18 1450    Lab Status:  Final result Specimen:  Blood from Arm, Right Updated:  03/14/18 1458     WBC 12 15 (H) Thousand/uL      RBC 4 47 Million/uL      Hemoglobin 12 4 g/dL      Hematocrit 37 9 %      MCV 85 fL      MCH 27 7 pg      MCHC 32 7 g/dL      RDW 12 7 %      MPV 10 0 fL      Platelets 187 Thousands/uL      nRBC 0 /100 WBCs      Neutrophils Relative 79 (H) %      Lymphocytes Relative 14 %      Monocytes Relative 5 %      Eosinophils Relative 2 %      Basophils Relative 0 %      Neutrophils Absolute 9 60 (H) Thousands/µL      Lymphocytes Absolute 1 75 Thousands/µL      Monocytes Absolute 0 55 Thousand/µL      Eosinophils Absolute 0 22 Thousand/µL      Basophils Absolute 0 03 Thousands/µL     Fingerstick Glucose (POCT) [[id number]]  (Abnormal) Collected:  03/14/18 1454    Lab Status:  Final result Updated:  03/14/18 1458     POC Glucose 200 (H) mg/dl                  XR foot 3+ views LEFT   Final Result by Yolanda Rosales MD (03/14 1540)      No osteomyelitis identified  Soft tissue swelling  Atherosclerosis  Posterior calcaneal bone spur formation  Workstation performed: [id number]                    Procedures  ECG 12 Lead Documentation  Date/Time: 3/14/2018 3:38 PM  Performed by: Dandre Fisher  Authorized by: Dandre Fisher     Indications / Diagnosis:  Dyspnea  ECG reviewed by me, the ED Provider: yes    Patient location:  ED  Interpretation:     Interpretation: normal    Rate:     ECG rate:  84    ECG rate assessment: normal    Rhythm:     Rhythm: sinus rhythm    Ectopy:     Ectopy: none    QRS:     QRS axis:  Normal  Conduction:     Conduction: normal    ST segments:     ST segments:  Normal  T waves:     T waves: normal             Phone Contacts  ED Phone Contact    ED Course  ED Course as of Mar 14 1656   Wed Mar 14, 2018   1545 Troponin I: (!) 0 56   1546 NT-proBNP: (!) 527   1546 Abnormal tests noted, new T wave inversions on EKG, elevated Trop and CRP  Case discussed with JACLYN (Dr Bogdan Sears), we will admit as inpatient, Tele, will call Podiatry for consult Iveth Cade informed)   Glucose: (!) 204 MDM  Number of Diagnoses or Management Options  Dyspnea: new and requires workup  Elevated troponin: new and requires workup  Hyperglycemia: new and requires workup  Non-healing wound of left heel: new and requires workup  T wave inversion in EKG: new and requires workup  Diagnosis management comments: D/D: Left heel dry gangrenous wound, rule out Osteomyelitis, gen weakness, PATINO, CHF, ACS  We will check labs, EKG, Trop, BNP, XR Lefft foot, consult Podiatry,       Amount and/or Complexity of Data Reviewed  Clinical lab tests: ordered and reviewed  Tests in the radiology section of CPT®: ordered and reviewed  Tests in the medicine section of CPT®: ordered and reviewed  Discuss the patient with other providers: yes  Independent visualization of images, tracings, or specimens: yes      CritCare Time    Disposition  Final diagnoses:   Non-healing wound of left heel   Dyspnea   T wave inversion in EKG   Elevated troponin   Hyperglycemia     Time reflects when diagnosis was documented in both MDM as applicable and the Disposition within this note     Time User Action Codes Description Comment    3/14/2018  4:00 PM Sandra, Alvino Mendoza Non-healing wound of left heel     3/14/2018  4:00 PM Bassem Godfrey Add [R06 00] Dyspnea     3/14/2018  4:01 PM Bassem Godfrey Add [R94 31] T wave inversion in EKG     3/14/2018  4:01 PM Bassem Godfrey Add [R74 8] Elevated troponin     3/14/2018  4:56 PM Bassem Godfrey Add [R73 9] Hyperglycemia       ED Disposition     ED Disposition Condition Comment    Admit  Case was discussed with JACLYN (Dr Zackery Kocher) and the patient's admission status was agreed to be Admission Status: inpatient status to the service of Dr Zackery Kocher  Follow-up Information    None       Patient's Medications   Discharge Prescriptions    No medications on file     No discharge procedures on file      ED Provider  Electronically Signed by           Wanda Stallings MD  03/14/18 6941

## 2018-03-15 ENCOUNTER — APPOINTMENT (INPATIENT)
Dept: NON INVASIVE DIAGNOSTICS | Facility: HOSPITAL | Age: 58
DRG: 264 | End: 2018-03-15
Payer: COMMERCIAL

## 2018-03-15 LAB
ALBUMIN SERPL BCP-MCNC: 2.5 G/DL (ref 3.5–5)
ALP SERPL-CCNC: 50 U/L (ref 46–116)
ALT SERPL W P-5'-P-CCNC: 20 U/L (ref 12–78)
ANION GAP SERPL CALCULATED.3IONS-SCNC: 9 MMOL/L (ref 4–13)
AST SERPL W P-5'-P-CCNC: 22 U/L (ref 5–45)
BILIRUB SERPL-MCNC: 0.37 MG/DL (ref 0.2–1)
BUN SERPL-MCNC: 13 MG/DL (ref 5–25)
CALCIUM SERPL-MCNC: 8.8 MG/DL (ref 8.3–10.1)
CHLORIDE SERPL-SCNC: 101 MMOL/L (ref 100–108)
CO2 SERPL-SCNC: 25 MMOL/L (ref 21–32)
CREAT SERPL-MCNC: 1.08 MG/DL (ref 0.6–1.3)
ERYTHROCYTE [DISTWIDTH] IN BLOOD BY AUTOMATED COUNT: 13 % (ref 11.6–15.1)
GFR SERPL CREATININE-BSD FRML MDRD: 75 ML/MIN/1.73SQ M
GLUCOSE SERPL-MCNC: 114 MG/DL (ref 65–140)
GLUCOSE SERPL-MCNC: 153 MG/DL (ref 65–140)
GLUCOSE SERPL-MCNC: 186 MG/DL (ref 65–140)
GLUCOSE SERPL-MCNC: 197 MG/DL (ref 65–140)
GLUCOSE SERPL-MCNC: 85 MG/DL (ref 65–140)
GLUCOSE SERPL-MCNC: 94 MG/DL (ref 65–140)
HCT VFR BLD AUTO: 35.6 % (ref 36.5–49.3)
HGB BLD-MCNC: 11.6 G/DL (ref 12–17)
MCH RBC QN AUTO: 27.7 PG (ref 26.8–34.3)
MCHC RBC AUTO-ENTMCNC: 32.6 G/DL (ref 31.4–37.4)
MCV RBC AUTO: 85 FL (ref 82–98)
PLATELET # BLD AUTO: 306 THOUSANDS/UL (ref 149–390)
PMV BLD AUTO: 10.1 FL (ref 8.9–12.7)
POTASSIUM SERPL-SCNC: 4 MMOL/L (ref 3.5–5.3)
PROT SERPL-MCNC: 7.6 G/DL (ref 6.4–8.2)
RBC # BLD AUTO: 4.19 MILLION/UL (ref 3.88–5.62)
SODIUM SERPL-SCNC: 135 MMOL/L (ref 136–145)
WBC # BLD AUTO: 10.32 THOUSAND/UL (ref 4.31–10.16)

## 2018-03-15 PROCEDURE — 87040 BLOOD CULTURE FOR BACTERIA: CPT | Performed by: INTERNAL MEDICINE

## 2018-03-15 PROCEDURE — 97163 PT EVAL HIGH COMPLEX 45 MIN: CPT

## 2018-03-15 PROCEDURE — 99253 IP/OBS CNSLTJ NEW/EST LOW 45: CPT | Performed by: NURSE PRACTITIONER

## 2018-03-15 PROCEDURE — G8979 MOBILITY GOAL STATUS: HCPCS

## 2018-03-15 PROCEDURE — G8978 MOBILITY CURRENT STATUS: HCPCS

## 2018-03-15 PROCEDURE — 80053 COMPREHEN METABOLIC PANEL: CPT | Performed by: INTERNAL MEDICINE

## 2018-03-15 PROCEDURE — G8987 SELF CARE CURRENT STATUS: HCPCS

## 2018-03-15 PROCEDURE — 85027 COMPLETE CBC AUTOMATED: CPT | Performed by: INTERNAL MEDICINE

## 2018-03-15 PROCEDURE — 93306 TTE W/DOPPLER COMPLETE: CPT | Performed by: INTERNAL MEDICINE

## 2018-03-15 PROCEDURE — 99253 IP/OBS CNSLTJ NEW/EST LOW 45: CPT | Performed by: INTERNAL MEDICINE

## 2018-03-15 PROCEDURE — 97166 OT EVAL MOD COMPLEX 45 MIN: CPT

## 2018-03-15 PROCEDURE — G8988 SELF CARE GOAL STATUS: HCPCS

## 2018-03-15 PROCEDURE — 99232 SBSQ HOSP IP/OBS MODERATE 35: CPT | Performed by: INTERNAL MEDICINE

## 2018-03-15 PROCEDURE — 93306 TTE W/DOPPLER COMPLETE: CPT

## 2018-03-15 PROCEDURE — 82948 REAGENT STRIP/BLOOD GLUCOSE: CPT

## 2018-03-15 RX ADMIN — INSULIN GLARGINE 70 UNITS: 100 INJECTION, SOLUTION SUBCUTANEOUS at 22:41

## 2018-03-15 RX ADMIN — MIDODRINE HYDROCHLORIDE 5 MG: 5 TABLET ORAL at 05:37

## 2018-03-15 RX ADMIN — METRONIDAZOLE 500 MG: 500 INJECTION, SOLUTION INTRAVENOUS at 01:50

## 2018-03-15 RX ADMIN — INSULIN LISPRO 1 UNITS: 100 INJECTION, SOLUTION INTRAVENOUS; SUBCUTANEOUS at 12:26

## 2018-03-15 RX ADMIN — SODIUM CHLORIDE 500 ML: 0.9 INJECTION, SOLUTION INTRAVENOUS at 05:37

## 2018-03-15 RX ADMIN — METOCLOPRAMIDE HYDROCHLORIDE 5 MG: 10 TABLET ORAL at 09:29

## 2018-03-15 RX ADMIN — ASPIRIN 325 MG: 325 TABLET, COATED ORAL at 09:28

## 2018-03-15 RX ADMIN — ENOXAPARIN SODIUM 40 MG: 40 INJECTION SUBCUTANEOUS at 09:28

## 2018-03-15 RX ADMIN — METRONIDAZOLE 500 MG: 500 INJECTION, SOLUTION INTRAVENOUS at 17:40

## 2018-03-15 RX ADMIN — ATORVASTATIN CALCIUM 40 MG: 40 TABLET, FILM COATED ORAL at 17:40

## 2018-03-15 RX ADMIN — METRONIDAZOLE 500 MG: 500 INJECTION, SOLUTION INTRAVENOUS at 09:30

## 2018-03-15 RX ADMIN — SILVER SULFADIAZINE: 10 CREAM TOPICAL at 09:49

## 2018-03-15 RX ADMIN — CEFTRIAXONE 1000 MG: 1 INJECTION, SOLUTION INTRAVENOUS at 17:45

## 2018-03-15 RX ADMIN — INSULIN LISPRO 1 UNITS: 100 INJECTION, SOLUTION INTRAVENOUS; SUBCUTANEOUS at 17:40

## 2018-03-15 NOTE — CONSULTS
Consult - Cardiology   Ángel Santos 61 y o  male MRN: [id number]  Unit/Bed#: E4 -01 Encounter: [id number]        Reason For Consult:  Chest discomfort, abnormal troponin                Assessment and Plan:     1  Chest pain:   -Atypical and longstanding without change ~~> doubt this is anginal   History suggestive of possible awareness of cardiac ectopy (probable isolated VPCs)   -Will check echocardiogram and continue close clinical observation deferring ischemic testing at this point      -Check ECG p r n  for pain  Continue telemetry  2   Elevated troponin:   -Modest elevation with somewhat static levels  Doubt this represents acute coronary syndrome  Non STEMI type 2 - perhaps secondary to CHF is plausible  3  Possible congestive heart failure-unspecified type:   -Will challenge with IV diuretic following response   -Check echocardiogram  4  Possible dyslipidemia:   -continue atorvastatin 40 mg as taken prior to admission ~~> will check fasting lipid profile addressing titration need thereafter  5  Orthostatic hypotension:  Known problem   -Patient reports modest burden of symptoms with no recent fall/presyncope/syncope   -He has independently assumed taking Midrin only once daily in the a m , though somewhat surprisingly, it has not seem to adversely affected burden of p m  symptoms  I advised him to reconsider t i d  Dosing  6  Left heel infection:  Reason for admission   -Consultations with vascular surgery and podiatric surgery forthcoming   -Can't provide additional comment on cardiac risk for surgery once surgical plan is identified  History Of Present Illness: This gentleman is a 62-year-old with medical history highlighted by diabetes mellitus with associated neuropathy, retinopathy, and probable gastroparesis  He also has a history of orthostatic hypotension, systemic hypertension (on no Rx), dyslipidemia, previous tobacco abuse, and obesity    He had previously seen a cardiologist in the [address] in 2016  Review of available note suggests that he may have been seen for complaints of dyspnea  He had undergone a pharmacologic stress test and echocardiogram with favorable results (see below) and after 1 or 2 subsequent office visits he did not continue cardiology care  For the last few weeks the patient has had a nonhealing wound on his left heel which has been refractory to outpatient management  Yesterday he was again seen by his family physician for reassessment and was referred to the hospital with concern for necrosis some possible osteomyelitis  As part of his evaluation the patient reported some symptoms of chest discomfort  Cardiac biomarkers were obtained with troponin levels of 0 56, 0 32, and 0 43  ProBNP level was 527  With regard to symptoms the patient describes some atypical chest pain present for months  He reports that when laying in left lateral position he feels a vague sharp or pressure sensation which last only a 2nd  He believes this may correlate to a sense of nonsustained cardiac ectopy (?VPCs)  He Has no exertional or other sustained discomfort  He also denies any sensation of sustained dysrhythmia/tachycardia  For the last year or longer the patient has easily fatigued and is generally deconditioned  With a walker he can walk room to room in his house  He infrequently goes outside and with effort can walk 30-50 feet before stopping  It is unclear that this has worsened in the last few weeks or months  He has, however, for approximately the last 2 months taken to sleeping in a recliner as this generally provides him greater comfort than lying flat  He may have had some component of orthopnea though this seems poorly defined  He does believe that at times there is probably some component of anxiety which augments his nocturnal dyspnea and ectopy    He does admit to a longstanding feeling of abdominal distention which she believes has somewhat improved since the addition of Reglan  She also reports chronic lower extremity edema without any recent worsening  He states several months ago he was initiated on a daily diuretic  He is unsure the degree to which this has benefited him  At the time of my visit he feels that his legs are still edematous though and similar to baseline  Per my examination he only has trivial edema  Past Medical History:        Past Medical History:   Diagnosis Date    Anemia     Autonomic neuropathy     Diabetes mellitus ([facility] )     Diabetic autonomic neuropathy associated with type 2 diabetes mellitus ([facility] )     Last Assessed: 12/28/2017    Orthostatic hypotension     No past surgical history on file  Allergy:        Allergies   Allergen Reactions    Metformin        Medications:       Prior to Admission medications    Medication Sig Start Date End Date Taking?  Authorizing Provider   atorvastatin (LIPITOR) 40 mg tablet Take 1 tablet (40 mg total) by mouth daily 2/20/18  Yes Luis Gallegos DO   insulin aspart protamine-insulin aspart (NOVOLOG MIX 70/30 FLEXPEN) 100 Units/mL SUPN Inject under the skin Twice daily 2/11/15  Yes Historical Provider, MD   LANTUS SOLOSTAR injection pen 100 units/mL Inject 0 8 mL (80 Units total) under the skin 2 (two) times a day 2/20/18  Yes Luis Gallegos DO   LORazepam (ATIVAN) 0 5 mg tablet Take by mouth every 8 (eight) hours as needed for anxiety   Yes Historical Provider, MD   metoclopramide (REGLAN) 5 mg tablet Take 5 mg by mouth daily     Yes Historical Provider, MD   midodrine (PROAMATINE) 5 mg tablet Take 1 tablet (5 mg total) by mouth 3 (three) times a day 2/20/18  Yes Luis Gallegos DO   torsemide (DEMADEX) 10 mg tablet Take 1 tablet (10 mg total) by mouth daily 2/21/18  Yes Luis Gallegos DO       Family History:     Family History   Problem Relation Age of Onset    No Known Problems Mother         Social History:       Social History     Social History    Marital status: /Civil Union     Spouse name: N/A    Number of children: N/A    Years of education: N/A     Social History Main Topics    Smoking status: Never Smoker    Smokeless tobacco: Never Used      Comment: Former smoker per [employer]    Alcohol use No    Drug use: No    Sexual activity: Not on file     Other Topics Concern    Not on file     Social History Narrative    No narrative on file       ROS:  Symptoms per HPI  Ambulates with a walker  Chronic peripheral dysesthesias and neuropathic discomfort  Occasional feelings of anxiety    Exam:  General:  Pleasant, cooperative, and comfortable appearing gentleman  Head: Normocephalic, atraumatic  Eyes:  EOMI  Pupils - equal, round, reactive to accomodation  No icterus  Normal Conjunctiva  Oropharynx:  Moist mucosa with no lesion  Neck:  No bruit or JVD  Heart:  Regular with controlled rate  No gross pathologic murmur  Lungs:  Modest excursion air exchange with some by basilar blunting and trace rales  Abdomen:   Protuberant, somewhat distended, soft and nontender with normal bowel sounds and no perceived organomegaly or mass  Lower Limbs:  Mild edema near ankle/foot  Pulses[de-identified]  RLE - DP: Faint to 1+                 LLE - DP:  Dressing not removed for evaluation  Musculoskeletal: Independent movement of limbs observed, Formal ROM and strength eval not performed  Neurologic:    Oriented to: person , place, situation  Cranial Nerves: grossly intact - vision, smell, taste, and hearing  were not tested  Motor function:grossly normal, symmetric   Sensation: Was not tested    DATA:      Echocardiogram:         Transthoracic echocardiogram, 2/16/2016, [address] [place] [address] Street:   Normal left ventricular systolic function  Estimated left ventricular ejection fraction is 60%  Normal right ventricular size and function  Aortic sclerosis without stenosis  No aortic regurgitation      Mitral annular calcification      The pulmonary artery pressure could not be calculated due to an     incomplete tricuspid regurgitant gradient measurement  Diastolic dysfunction with normal filling pressures  Visually Estimated LV Ejection Fraction is:65%    Ischemic Testing:  Pharmacologic stress imaging examination 2/16/2016-Miki Penaloza       Moderate sized perfusion defect of mild to moderate intensity is seen        affecting the inferior wall  This is predominantly fixed defect         Prone images not available  Please correlate clinically  No evidence        of any significant ischemia        Non-diagnostic lexiscan EKG stress test         Normal left ventricular size and ejection fraction         Possible mild inferior wall hypokinesis  This  could be related to        reduced counts resulting from subdiaphragmatic activity      Post Stress Image LV EF:    57 %        Stress EDV: 119   ml         EDVI:  49    ml/m²      TID:  1 1        Stress ESV: 51    ml         ESVI:  21    ml/m²              Weights: Wt Readings from Last 3 Encounters:   03/15/18 117 kg (258 lb 9 6 oz)   03/14/18 119 kg (263 lb 3 2 oz)   02/20/18 118 kg (260 lb 9 6 oz)   , Body mass index is 3,955 22 kg/m²           Lab Studies:      Results from last 7 days  Lab Units 03/14/18  2041 03/14/18  1744 03/14/18  1450   TROPONIN I ng/mL 0 43* 0 32* 0 56*            Results from last 7 days  Lab Units 03/15/18  0546 03/14/18  1744 03/14/18  1450   WBC Thousand/uL 10 32*  --  12 15*   HEMOGLOBIN g/dL 11 6*  --  12 4   HEMATOCRIT % 35 6*  --  37 9   PLATELETS Thousands/uL 306 283 267   ,   Results from last 7 days  Lab Units 03/15/18  0546 03/14/18  1450   SODIUM mmol/L 135* 135*   POTASSIUM mmol/L 4 0 4 1   CHLORIDE mmol/L 101 97*   CO2 mmol/L 25 29   BUN mg/dL 13 14   CREATININE mg/dL 1 08 1 17   CALCIUM mg/dL 8 8 9 0   TOTAL PROTEIN g/dL 7 6 8 2   BILIRUBIN TOTAL mg/dL 0 37 0 40   ALK PHOS U/L 50 59   ALT U/L 20 21   AST U/L 22 26   GLUCOSE RANDOM mg/dL 114 204*

## 2018-03-15 NOTE — ASSESSMENT & PLAN NOTE
Patient being followed at his primary care provider for wounds to the left foot  He is an uncontrolled diabetic with peripheral neuropathy  He has multiple toes with eschar and a large lateral plantar eschar to the Left foot and was sent to ED 3/13/18 by PCP for w/u  --LEADs done 03/06/2018   -diffuse disease in the femoral popliteal arteries bilaterally with suggested tibioperoneal disease  -ABIs:  RIGHT:  1 31/172/108    LEFT: 1 7/103/51 (supra normal, consistent with poorly compressible vessels  )  --dopplerable biphasic signals bilaterally at AT/DP/PT/Peroneal  --palpable femoral pulses bilaterally  --patient on aspirin and statin  --podiatry providing local wound care; may need debridement  --Poorly controlled DM and poor nutrition, concern for wound healing  --will need arteriogram with lower extremity runoff and possible intervention  --will discuss with Dr Garcia Sine

## 2018-03-15 NOTE — ASSESSMENT & PLAN NOTE
wound to left plantar foot with eschar/necrosis  --patient afebrile, nontoxic appearance, WBC count 10 32 today  --left foot x-ray shows no signs of osteomyelitis  --podiatry following with local wound care, may need debridement  --patient on prophylactic antibiotics per primary team  --discussed with Dr Zackery Kocher and podiatry

## 2018-03-15 NOTE — SOCIAL WORK
Met with pt to complete assessment  CM name/phone number on whiteboard in pt's room  PCP is Dr Rebeca Hudson  Pt lives in [place] with his spouse in a 2 story house with 3 steps to enter the front door  There is a half a bathroom on the first floor  Pt only goes upstairs to shower because it is difficult to do steps  Pt sleeps in recliner  Pt amb with rollator and was independent with ADLs  Pt was not able to drive car any more  Pt is now on disability and it has been a struggle financially  Spouse works full time and part time  Pt is on his own for most of the day  DME:  Rollator, SPC, and Quad cane  Pt was not open with [facility]  Pt uses [address] in [place]  Pt has hx of Anxiety & Depression on H&P  Pt stated his son will pick him when he is discharge  Spouse is  - Nancy Hansenr [phone]  It is unclear if pt will have any discharge needs at this time  Plan is home with spouse  Will follow

## 2018-03-15 NOTE — PROGRESS NOTES
Marcella Menard MD and informed him of patient's last troponin that went up from the previous one  No new orders at this time  Will continue to monitor

## 2018-03-15 NOTE — PHYSICAL THERAPY NOTE
PHYSICAL THERAPY EVALUATION  NAME:  Florian Amador  DATE: 03/15/18    AGE:   61 y o  Mrn:   [id number]  ADMIT DX:  Dyspnea [R06 00]  Hyperglycemia [R73 9]  Elevated troponin [R74 8]  Visit for wound check [Z51 89]  T wave inversion in EKG [R94 31]  Non-healing wound of left heel [S91 302A]    Past Medical History:   Diagnosis Date    Anemia     Autonomic neuropathy     Diabetes mellitus ([facility] )     Diabetic autonomic neuropathy associated with type 2 diabetes mellitus ([facility] )     Last Assessed: 12/28/2017    Orthostatic hypotension        No past surgical history on file      Length Of Stay: 1    PHYSICAL THERAPY EVALUATION:      03/15/18 1518   Note Type   Note type Eval only   Pain Assessment   Pain Assessment No/denies pain   Home Living   Type of [address] Multi-level;Stairs to enter with rails  (3 VITOR; 10 +10 to second floor )   Home Equipment Walker;Cane;Quad cane  (rollator )   Additional Comments pt reports liivng with wife who is able to assist pt at home if needed, however pt states that his wife 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   Prior Function   Level of [place] Independent with ADLs and functional mobility   Lives With [place] Help From Family   ADL Assistance Independent   IADLs Needs assistance   Falls in the last 6 months 0   Comments Pt reports the use of a rollator for ambulation in the home and community PTA    Restrictions/Precautions   Weight Bearing Precautions Per Order No   Other Precautions Fall Risk;Pain;Multiple lines;Telemetry   General   Family/Caregiver Present No   Cognition   Overall Cognitive Status WFL   Arousal/Participation Alert   Attention Within functional limits   Orientation Level Oriented X4   Memory Within functional limits   Following Commands Follows all commands and directions without difficulty   RUE Assessment   RUE Assessment WFL   LUE Assessment   LUE Assessment WFL   RLE Assessment   RLE Assessment WFL   Strength RLE   RLE Overall Strength 4+/5 LLE Assessment   LLE Assessment WFL   Strength LLE   LLE Overall Strength 4+/5   Light Touch   RLE Light Touch Impaired   RLE Light Touch Comments decreased sensation    LLE Light Touch Impaired   LLE Light Touch Comments decreased sensation    Bed Mobility   Supine to Sit 4  Minimal assistance   Additional items Assist x 1; Increased time required;LE management   Transfers   Sit to Stand 4  Minimal assistance   Additional items Assist x 1; Increased time required;Verbal cues   Stand to Sit 4  Minimal assistance   Additional items Assist x 1; Increased time required;Verbal cues   Additional Comments VC needed for hand placement and safety with all transfers    Ambulation/Elevation   Gait pattern Excessively slow; Short stride; Foward flexed   Gait Assistance 5  Supervision   Additional items Verbal cues   Assistive Device Rolling walker   Distance 60ft x 2    Balance   Static Sitting Fair +   Static Standing Fair -   Ambulatory Poor +   Endurance Deficit   Endurance Deficit Yes   Endurance Deficit Description fatigue    Activity Tolerance   Activity Tolerance Patient limited by fatigue   Nurse Made Aware pt appropriate to be seen per YI Booker   Assessment   Prognosis Fair   Problem List Decreased strength;Decreased endurance; Impaired balance;Decreased mobility; Decreased safety awareness;Decreased skin integrity; Impaired sensation   Assessment Pt is 61 y o  male seen for PT evaluation [facility]  Two pt identifiers were used to confirm  Pt presented w/ LLE wound and increased chest pain  Pt was admitted with a primary dx of: non healing L foot wound, NSTEMI, DM  Suzette Chatterjee PT now consulted for assessment of mobility and d/c needs  Pts current co morbidities effecting treatment include: Anemia, autonomic neuropathy, DM, orthostatic hypotension, and personal factors including VITOR home and steps to manage inside the home    Pts current clinical presentation is Unstable/ Unpredictable (high complexity) due to Ongoing medical management for primary dx, Increased reliance on more restrictive AD compared to baseline, Decreased activity tolerance compared to baseline, Fall risk, Ongoing telemetry monitoring, Trending lab values    Prior to admission, pt was I with ambulation with the use of a rollator  Upon evaluation, pt currently is requiring min A for bed mobility; min A for transfers and S for ambulation w/ RW   Pt denies any lightheadedness and dizziness with ambulation  Pt presents at PT eval functioning below baseline and currently w/ overall mobility deficits 2* to: BLE weakness, impaired balance, decreased endurance, gait deviations, decreased activity tolerance compared to baseline, decreased safety awareness, fall risk, decreased skin integrity  Pt currently at a fall risk 2* to impairments listed above  Pt will continue to benefit from skilled PT interventions to address stated impairments; to maximize functional mobility; for ongoing pt/ family training; and DME needs  At conclusion of PT session pt returned back in chair with phone and call bell within reach  Pt denies any further questions at this time  PT is currently recommending home PT, however pt is unaware if he will be able to afford this at this time  Pt/ family agreeable to plan and goals as stated on evaluation  PT will continue to follow during hospital stay  Barriers to Discharge Decreased caregiver support   Goals   Patient Goals " to get better"   STG Expiration Date 03/25/18   Short Term Goal #1 In 10 days pt will complete: 1) Bed mobility skills with mod I  2) Functional transfers with mod I  3) Ambulate 200' using least restrictive AD with S without LOB and stable vitals  4) Stair training up/ down 10 step/s using rail/s with S  5) Improve balance grades to Good 6) Improve BLE strength by 1/2 grade   7) PT for ongoing pt and family education; DME needs and D/C planning to promote highest level of function in least restrictive environment  Plan   Treatment/Interventions Functional transfer training;LE strengthening/ROM; Elevations; Therapeutic exercise; Endurance training;Patient/family training;Equipment eval/education; Bed mobility;Gait training;Spoke to nursing;OT   PT Frequency 5x/wk   Recommendation   Recommendation Home PT; Other (Comment)  (Pt states that he may not be able to afford home PT )   Equipment Recommended [place] Boot; Other (Comment)  (RW, Commode)   PT - OK to Discharge Yes  (when medically cleared )   Modified [place] Scale   Modified Alana Scale 4   Barthel Index   Feeding 10   Bathing 0   Grooming Score 5   Dressing Score 5   Bladder Score 10   Bowels Score 10   Toilet Use Score 5   Transfers (Bed/Chair) Score 10   Mobility (Level Surface) Score 0   Stairs Score 0   Barthel Index Score 55   Uziel Castro, PT

## 2018-03-15 NOTE — PLAN OF CARE
DISCHARGE PLANNING     Discharge to home or other facility with appropriate resources Progressing        INFECTION - ADULT     Absence or prevention of progression during hospitalization Progressing     Absence of fever/infection during neutropenic period Progressing        Knowledge Deficit     Patient/family/caregiver demonstrates understanding of disease process, treatment plan, medications, and discharge instructions Progressing        Potential for Falls     Patient will remain free of falls Progressing        Prexisting or High Potential for Compromised Skin Integrity     Skin integrity is maintained or improved Progressing        SAFETY ADULT     Maintain or return to baseline ADL function Progressing     Maintain or return mobility status to optimal level Progressing        SKIN/TISSUE INTEGRITY - ADULT     Skin integrity remains intact Progressing     Incision(s), wounds(s) or drain site(s) healing without S/S of infection Progressing

## 2018-03-15 NOTE — CONSULTS
Consult - Podiatry   Thedore Cabot 61 y o  male MRN: [id number]  Unit/Bed#: E4 -01 Encounter: [id number]    Assessment/Plan     3  50-year-old male with left foot plantar heel unstageable eschar and 4th digit DTI likely secondary to PAD with probable Cellulitis - both POA     -afebrile with vital stable, nontoxic in appearance, leaukocytosis of 10 32   -, CRP 79 5   -right foot x-ray shows no signs of osteomyelitis; some soft tissue swelling  -heel eschar appears to be dry and stable with no drainage  There is erythema to the periphery of the wound concerning for cellulitis   -betadine paint applied   -c/w ceftriaxone/flagyl per SLIM  -will discuss this plan with attending     2  Diabetes mellitus type 2 with neuropathy- A1c 9 4%   -management per primary team    3  Peripheral arterial disease  -leads from 3/6/18 reviewed which shows R YUDITH 1 31, ; L YUDITH 1 7, GTP 51   -vascular consult pending     Thank you for the consultation, we will follow the patient along with you while in house  History of Present Illness     HPI:  Thedore Cabot is a 61 y o  male who presents with left heel wound that has been present for about 1 month now  He has been seeing his primary care physician for this problem, who suggested to go to ED  Patient has a history of diabetes with severe neuropathy as he has no any sensation to the bottom of his feet  He denies any trauma to his feet, however, he has poor vision secondary to his cataracts for which he needs surgery  Unfortunately, he has to postpone the cataract surgery due to being hospitalized for his left heel issue  Denies any constitutional symptoms  Inpatient consult to Podiatry     Performed by  Mil Dawkins by Jake Torres       Consent: Verbal consent obtained  Review of Systems   Constitutional: Negative  HENT: Negative  Eyes: Negative  Respiratory: Negative  Cardiovascular: Negative  Gastrointestinal: Negative  Musculoskeletal: negative   Skin: left heel unstageable wound    Neurological: Negative  Psych: negative  Historical Information   Past Medical History:   Diagnosis Date    Anemia     Autonomic neuropathy     Diabetes mellitus ([facility] )     Diabetic autonomic neuropathy associated with type 2 diabetes mellitus ([facility] )     Last Assessed: 12/28/2017    Orthostatic hypotension      No past surgical history on file    Social History   History   Alcohol Use No     History   Drug Use No     History   Smoking Status    Never Smoker   Smokeless Tobacco    Never Used     Comment: Former smoker per [employer]     Family History:   Family History   Problem Relation Age of Onset    No Known Problems Mother        Meds/Allergies   Facility-Administered Medications Prior to Admission   Medication    silver sulfadiazine (SILVADENE,SSD) 1 % cream     Prescriptions Prior to Admission   Medication    atorvastatin (LIPITOR) 40 mg tablet    insulin aspart protamine-insulin aspart (NOVOLOG MIX 70/30 FLEXPEN) 100 Units/mL SUPN    LANTUS SOLOSTAR injection pen 100 units/mL    LORazepam (ATIVAN) 0 5 mg tablet    metoclopramide (REGLAN) 5 mg tablet    midodrine (PROAMATINE) 5 mg tablet    torsemide (DEMADEX) 10 mg tablet     Allergies   Allergen Reactions    Metformin        Objective   First Vitals:   Blood Pressure: 107/59 (03/14/18 1407)  Pulse: 85 (03/14/18 1407)  Temperature: 98 2 °F (36 8 °C) (03/14/18 1407)  Temp Source: Oral (03/14/18 1407)  Respirations: 18 (03/14/18 1407)  Weight - Scale: 117 kg (257 lb 4 4 oz) (03/14/18 1704)  SpO2: 96 % (03/14/18 1409)    Current Vitals:   Blood Pressure: 160/85 (03/15/18 0739)  Pulse: 75 (03/15/18 0739)  Temperature: 98 3 °F (36 8 °C) (03/15/18 0739)  Temp Source: Tympanic (03/15/18 0739)  Respirations: 18 (03/15/18 0739)  Weight - Scale: 117 kg (258 lb 9 6 oz) (03/15/18 0600)  SpO2: 98 % (03/15/18 0739)        /85 (BP Location: Right arm)   Pulse 75   Temp 98 3 °F (36 8 °C) (Tympanic)   Resp 18   Wt 117 kg (258 lb 9 6 oz)   SpO2 98%   BMI 3955 22 kg/m²      General Appearance:    Alert, cooperative, no distress   Head:    Normocephalic, without obvious abnormality, atraumatic   Eyes:    PERRL, conjunctiva/corneas clear, EOM's intact        Nose:   Moist mucous membranes   Neck:   Supple, symmetrical, trachea midline   Back:     Symmetric   Lungs:     Respirations unlabored   Heart:    Regular rate and rhythm, S1 and S2 normal, no murmur, rub   or gallop   Abdomen:     Soft, non-tender   Extremities:   Motor function intact to b/l LE  Pulses:   R DP 1/4, R PT non palpable (biphasic), L DP non palpable (biphasic), L PT non palpable (biphasic)  L peroneal monophasic  Cap refill normal to the digits  Skin:   Left heel unstageable wound measures 5cm x5cm with 100% black eschar  There is no drainage  No exposed bone or tendon  There is erythema to the periphery of the eschar extending proximally to the dorsal lateral foot which is likely secondary to PAD  DTI noted on the left 4th digit as well  No signs of infection noted  Neurologic:   Gross sensation is absent            Left foot digits                                                    Left heel unstageable wound           Lab Results:   Admission on 03/14/2018   Component Date Value    WBC 03/14/2018 12 15*    RBC 03/14/2018 4 47     Hemoglobin 03/14/2018 12 4     Hematocrit 03/14/2018 37 9     MCV 03/14/2018 85     MCH 03/14/2018 27 7     MCHC 03/14/2018 32 7     RDW 03/14/2018 12 7     MPV 03/14/2018 10 0     Platelets 46/83/6277 267     nRBC 03/14/2018 0     Neutrophils Relative 03/14/2018 79*    Lymphocytes Relative 03/14/2018 14     Monocytes Relative 03/14/2018 5     Eosinophils Relative 03/14/2018 2     Basophils Relative 03/14/2018 0     Neutrophils Absolute 03/14/2018 9 60*    Lymphocytes Absolute 03/14/2018 1 75     Monocytes Absolute 03/14/2018 0 55     Eosinophils Absolute 03/14/2018 0 22     Basophils Absolute 03/14/2018 0 03     Protime 03/14/2018 13 8     INR 03/14/2018 1 06     PTT 03/14/2018 30     NT-proBNP 03/14/2018 527*    Troponin I 03/14/2018 0 56*    Sodium 03/14/2018 135*    Potassium 03/14/2018 4 1     Chloride 03/14/2018 97*    CO2 03/14/2018 29     Anion Gap 03/14/2018 9     BUN 03/14/2018 14     Creatinine 03/14/2018 1 17     Glucose 03/14/2018 204*    Calcium 03/14/2018 9 0     AST 03/14/2018 26     ALT 03/14/2018 21     Alkaline Phosphatase 03/14/2018 59     Total Protein 03/14/2018 8 2     Albumin 03/14/2018 2 8*    Total Bilirubin 03/14/2018 0 40     eGFR 03/14/2018 68     Sed Rate 03/14/2018 100*    CRP 03/14/2018 79 5*    POC Glucose 03/14/2018 200*    Ventricular Rate 03/14/2018 84     Atrial Rate 03/14/2018 84     ME Interval 03/14/2018 138     QRSD Interval 03/14/2018 90     QT Interval 03/14/2018 362     QTC Interval 03/14/2018 427     P Axis 03/14/2018 60     QRS [place] 03/14/2018 -11     T Wave Axis 03/14/2018 138     POC Glucose 03/14/2018 126     Platelets 71/56/2592 283     MPV 03/14/2018 9 7     Troponin I 03/14/2018 0 32*    Hemoglobin A1C 03/14/2018 9 4*    EAG 03/14/2018 223     Troponin I 03/14/2018 0 43*    POC Glucose 03/14/2018 163*    Sodium 03/15/2018 135*    Potassium 03/15/2018 4 0     Chloride 03/15/2018 101     CO2 03/15/2018 25     Anion Gap 03/15/2018 9     BUN 03/15/2018 13     Creatinine 03/15/2018 1 08     Glucose 03/15/2018 114     Calcium 03/15/2018 8 8     AST 03/15/2018 22     ALT 03/15/2018 20     Alkaline Phosphatase 03/15/2018 50     Total Protein 03/15/2018 7 6     Albumin 03/15/2018 2 5*    Total Bilirubin 03/15/2018 0 37     eGFR 03/15/2018 75     WBC 03/15/2018 10 32*    RBC 03/15/2018 4 19     Hemoglobin 03/15/2018 11 6*    Hematocrit 03/15/2018 35 6*    MCV 03/15/2018 85     MCH 03/15/2018 27 7     MCHC 03/15/2018 32 6     RDW 03/15/2018 13 0     Platelets 80/14/4680 306     MPV 03/15/2018 10 1     POC Glucose 03/15/2018 85     POC Glucose 03/15/2018 94                    Invalid input(s): LABAEARO            Imaging: I have personally reviewed pertinent films in PACS  EKG, Pathology, and Other Studies: I have personally reviewed pertinent reports        Code Status: Level 1 - Full Code  Advance Directive and Living Will:      Power of :    POLST:

## 2018-03-15 NOTE — PLAN OF CARE
DISCHARGE PLANNING     Discharge to home or other facility with appropriate resources Progressing        INFECTION - ADULT     Absence or prevention of progression during hospitalization Progressing     Absence of fever/infection during neutropenic period Progressing        Knowledge Deficit     Patient/family/caregiver demonstrates understanding of disease process, treatment plan, medications, and discharge instructions Progressing        Nutrition/Hydration-ADULT     Nutrient/Hydration intake appropriate for improving, restoring or maintaining nutritional needs Progressing        Potential for Falls     Patient will remain free of falls Progressing        Prexisting or High Potential for Compromised Skin Integrity     Skin integrity is maintained or improved Progressing        SAFETY ADULT     Maintain or return to baseline ADL function Progressing     Maintain or return mobility status to optimal level Progressing        SKIN/TISSUE INTEGRITY - ADULT     Skin integrity remains intact Progressing     Incision(s), wounds(s) or drain site(s) healing without S/S of infection Progressing

## 2018-03-15 NOTE — OCCUPATIONAL THERAPY NOTE
[address] [facility] Evaluation     Patient Name: Rasheed Lewis  Today's Date: 3/15/2018  Problem List  Patient Active Problem List   Diagnosis    Anxiety and depression    Benign essential HTN    Diabetic autonomic neuropathy associated with type 2 diabetes mellitus ([facility] )    Diabetic neuropathy, type II diabetes mellitus ([facility] )    Diabetic peripheral neuropathy ([facility] )    Hyperlipidemia    Type 2 diabetes mellitus with diabetic autonomic neuropathy, with long-term current use of insulin ([facility])    Vitamin D deficiency    Peripheral vascular disease ([place] [address] )    Gangrene ([place] [address] )    Dyspnea    Hyperglycemia    Elevated troponin    T wave inversion in EKG    Non-healing wound of left heel     Past Medical History  Past Medical History:   Diagnosis Date    Anemia     Autonomic neuropathy     Diabetes mellitus ([place] [address] )     Diabetic autonomic neuropathy associated with type 2 diabetes mellitus ([place] [address] )     Last Assessed: 12/28/2017    Orthostatic hypotension      Past Surgical History  No past surgical history on file         03/15/18 0297   Note Type   Note type Eval/Treat   Restrictions/Precautions   Weight Bearing Precautions Per Order No   Other Precautions Fall Risk;Pain;Telemetry;Multiple lines   Pain Assessment   Pain Assessment No/denies pain   Pain Score No Pain   Home Living   Type of Home House  ([facility])   Home Layout Multi-level;Stairs to enter without rails  (3 VITOR, flight+landing and flight)   Bathroom Shower/Tub Tub/shower unit   Bathroom Toilet Standard   Bathroom Accessibility Accessible   Home Equipment Walker;Cane;Quad cane  (rollator)   Additional Comments wife completes driving    Prior Function   Level of [place] Independent with ADLs and functional mobility   Lives With [facility] Help From Family   ADL Assistance Independent  (assist for LB ADLs difficulty w/ toileting)   IADLs Needs assistance  (spouse complete)   Falls in the last 6 months 0   Vocational On disability Lifestyle   Autonomy per pt indpendent w/ ADLs difficulties w/ LB Dressing, independent w/ functional transfers and mobility w/ SPC and RW   Reciprocal Relationships spouse   Service to Others retied from [facility] watching sports   ADL   Where Assessed Edge of bed   Eating Assistance 5  Supervision/Setup   Grooming Assistance 5  Supervision/Setup   UB Bathing Assistance 5  Supervision/Setup   LB Bathing Assistance 3  Moderate Assistance   UB Dressing Assistance 5  Supervision/Setup   LB Dressing Assistance 3  Moderate Assistance   Toileting Assistance  3  Moderate Assistance   Toileting Deficit (pt reports difficulty wiping after bowel movement)   Bed Mobility   Supine to Sit 4  Minimal assistance   Additional items Assist x 1; Increased time required;Verbal cues;LE management; Bedrails   Additional Comments VCs increased time to complete   Transfers   Sit to Stand 4  Minimal assistance   Additional items Assist x 1; Increased time required;Verbal cues;Armrests   Stand to Sit 4  Minimal assistance   Additional items Assist x 1; Increased time required;Verbal cues;Armrests   Additional Comments VCs for positioning   Functional Mobility   Functional Mobility 4  Minimal assistance   Additional Comments assist x 1 w/ VCs   Additional items Rolling walker   Balance   Static Sitting Fair +   Dynamic Sitting Fair   Static Standing Fair -   Dynamic Standing Poor +   Ambulatory Poor +   Activity Tolerance   Activity Tolerance Patient limited by fatigue;Treatment limited secondary to medical complications (Comment)   Nurse Made Aware appropriate to see per Ade RICHMOND   RUTANYA Assessment   RUE Assessment WFL   LUE Assessment   LUE Assessment WFL   Hand Function   Gross Motor Coordination Functional   Fine Motor Coordination Impaired   Sensation   Light Touch Severe deficits in the RUE;Severe deficits in the LLE; Severe deficits in the RLE; Severe deficits in the LUE   Additional Comments reports burning hands, difficulty w/ utensils   Vision-Basic Assessment   Current Vision Wears glasses all the time   Vision - Complex Assessment   Ocular Range of Motion [facility]   Acuity Able to read clock/calendar on wall without difficulty   Perception   Inattention/Neglect Appears intact   Cognition   Overall Cognitive Status [facility]   Arousal/Participation Alert; Cooperative   Attention Within functional limits   Orientation Level Oriented X4   Memory Within functional limits   Following Commands Follows all commands and directions without difficulty   Comments pt engages in appropriate conversations   Assessment   Limitation Decreased ADL status; Decreased UE strength;Decreased Safe judgement during ADL;Decreased endurance;Decreased cognition;Decreased self-care trans;Decreased high-level ADLs; Decreased sensation   Prognosis Good   Assessment Pt is a 61 y o  male seen for OT evaluation s/p admit to [facility]  on 3/14/2018 w/ Non-healing wound of left heel and chest pain  Comorbidities affecting pt's functional performance at time of assessment include: DM, neuropathy, CHF, orthostatic hypotension, PVD, PAD  Personal factors affecting pt at time of IE include:alone during day as wife has fulltime and part-time job and works over [address] [place] [address]  Pt sleeps in recliner at home and stays on first floor goes to 2nd floor several times a week to shower  Prior to admission, pt was living w/ spouse and reports independent w/ ADLs except difficulties w/ wiping self after toileting, independent w/ functional transfers and mobility w/ RW  Upon evaluation: Pt requires MIN assist sit<>stand w/ VCs for safety, MIN assist supine>sit bed mobility, MIN assist functional mobility w/ RW, MIN-MOD assist LB ADLs 2* the following deficits impacting occupational performance: decreased strength and endurance, impaired balance, impaired activity tolerance, decreased sensation, increased fatigue, dyspnea on exertion   Pt to benefit from continued skilled OT tx while in the hospital to address deficits as defined above and maximize level of functional independence w ADL's and functional mobility  Occupational Performance areas to address include: eating, grooming, bathing/shower, toilet hygiene, dressing, functional mobility and clothing management, energy conservation education, education on adaptive devices and foam for utensils  Educated pt on benefit of BSC for shower and use of toilet aide of hygiene, pt receptive to education  Recommend home w/ HOME OT  Goals   Patient Goals "get better"   LTG Time Frame 7-10   Long Term Goal please see below goals   Plan   Treatment Interventions ADL retraining;Functional transfer training; Endurance training;UE strengthening/ROM; Cognitive reorientation;Patient/family training;Equipment evaluation/education; Compensatory technique education; Energy conservation; Activityengagement   Goal Expiration Date 03/25/18   OT Frequency 3-5x/wk   Recommendation   OT Discharge Recommendation Home OT   Equipment Recommended Bedside commode   Barthel Index   Feeding 10   Bathing 0   Grooming Score 5   Dressing Score 5   Bladder Score 10   Bowels Score 10   Toilet Use Score 5   Transfers (Bed/Chair) Score 10   Mobility (Level Surface) Score 0   Stairs Score 0   Barthel Index Score 55   Modified Bangs Scale   Modified Bangs Scale 4     Occupational Therapy Goals to be met in 7-10 days:  1) Pt will improve activity tolerance to G for min 30 min txment sessions  2) Pt will complete ADLs/self care w/ mod I w/ LHAE  3) Pt will complete toileting w/ mod I w/ G hygiene/thoroughness using DME PRN  4) Pt will improve functional transfers on/off all surfaces using DME PRN w/ G balance/safety including toileting w/ mod I  5) Pt will improve fx'l mobility during I/ADl/leisure tasks using DME PRN w/ g balance/safety w/ mod I  6) Pt will engage in ongoing cognitive assessment w/ G participation to A w/ safe d/c planning/recommendations  7) Pt will demonstrate G carryover of pt/caregiver education and training as appropriate w/ mod I  w/ G tolerance  8) Pt will engage in depression screen/leisure interest checklist w/ G participation to monitor s/s depression and ID 3 positive coping strategies to A w/ emotional regulation and management  9) Pt will demonstrate 100% carryover of E C  techniques w/ mod I t/o fx'l I/ADL/leisure tasks w/o cues s/p skilled education  10) Pt will demonstrate improved bed mobility to MOD I  11) Pt will engage in activity configuration activity w/ G participation and mod I to increase time management skills and improve participation in a structured routine to improve overall quality of life  12) Pt will demonstrate 100% carryover of LHAE for LB ADLs/self care and leisure s/p skilled education w/ mod I and G participation  13) Pt will demonstrate improved standing tolerance to 3-5 minutes during functional tasks w/ no LOB to enhance ADL performance  14) Pt will demonstrate use of foam utensils to MOD I to enhance self-feeding    Documentation completed by: Lorenza Aguilera MS, OTR/L

## 2018-03-15 NOTE — PROGRESS NOTES
[facility] Internal Medicine Progress Note  Patient: Goldie Moreno 61 y o  male   MRN: [id number]  PCP: Monika Harris DO  Unit/Bed#: E4 [facility]-01 Encounter: [id number]  Date Of Visit: 03/15/18    Assessment:    Principal Problem:    Non-healing wound of left heel  Active Problems:    Anxiety and depression    Benign essential HTN    Diabetic autonomic neuropathy associated with type 2 diabetes mellitus ([facility] )    Diabetic neuropathy, type II diabetes mellitus ([facility] )    Diabetic peripheral neuropathy (HCC)    Hyperlipidemia    Peripheral vascular disease (HCC)    Dyspnea    Hyperglycemia    Elevated troponin    T wave inversion in EKG      Plan:    1 )  Left foot infection/PVD  -patient has an ongoing left heel infection for the past 1 month which is progressively worsened the past 2 weeks with eschar and gangrenous formation  -patient had lower extremity Dopplers which were negative for DVT  -patient also had lower extremity arterial Dopplers on 03/06/18 which revealed diffuse atherosclerotic disease noted throughout the femoral popliteal arteries suggested of tibioperoneal disease  -patient has palpable pulses in bilateral lower extremities and skin is warm to touch  -there is concern of gangrenous formation of the heel and also the tips of the left foot  -x-ray is negative for any osteomyelitis, , CRP 79 5  -podiatry consult will be appreciated  -vascular consult appreciated  -further intervention forefoot will need to be discussed with Cardiology and further evaluated due to his chest pain and elevated troponin, at this point patient would not be a surgical candidate until cardiac status is stable  -will start patient on empiric antibiotics with mild leukocytosis, ceftriaxone and Flagyl     2 )  Chest pain/NSTEMI  -rule out ACS patient has significant risk factors including age, diabetes with multiple complications  -troponin 0 56, 0 32, 0 43  -telemetry monitoring   -aspirin, statin  -morphine as needed for pain  -will hold any beta blocker at this point given patient has history of diabetic autonomic neuropathy with history of hypotension and requiring midodrine  -cardiology consult will be appreciated     3 )  Diabetes mellitus  -patient takes Lantus 70 units b i d  will continue this regimen, monitor Accu-Cheks and sliding scale for coverage     4 )  Gastroparesis  -continue with Reglan, Zofran as needed     5 )  Diabetic autonomic neuropathy  -continue with midodrine     6 )  Rule out new onset heart failure  -given patient's lower extremity edema, orthopnea, PND and intermittent chest pain there is concern of underlying cardiomyopathy whether this is ischemic or nonischemic  -BNP is slightly elevated at 527, troponin 0 56  -will get 2D echo for further evaluation       VTE Pharmacologic Prophylaxis:   Pharmacologic:  Lovenox    Mechanical VTE Prophylaxis in Place:  Yes    Patient Centered Rounds: I have performed bedside rounds with nursing staff today  Discussions with Specialists or Other Care Team Provider: yes    Education and Discussions with Family / Patient: yes    Time Spent for Care: 20 minutes  More than 50% of total time spent on counseling and coordination of care as described above  Current Length of Stay: 1 day(s)    Current Patient Status: Inpatient   Certification Statement: The patient will continue to require additional inpatient hospital stay due to Left foot wound    Discharge Plan / Estimated Discharge Date: 2-3 days    Code Status: Level 1 - Full Code      Subjective:   Patient seen and examined at bedside, currently denies any complaints    Objective:     Vitals:   Temp (24hrs), Av 9 °F (37 2 °C), Min:98 1 °F (36 7 °C), Max:99 9 °F (37 7 °C)    HR:  [70-90] 76  Resp:  [17-20] 18  BP: ()/(58-85) 145/78  SpO2:  [96 %-100 %] 98 %  Body mass index is 3,955 22 kg/m²  Input and Output Summary (last 24 hours):        Intake/Output Summary (Last 24 hours) at 03/15/18 1125  Last data filed at 03/15/18 1101   Gross per 24 hour   Intake              720 ml   Output             2650 ml   Net            -1930 ml       Physical Exam:    Constitutional: Patient is oriented to person, place and time, no acute distress  HEENT:  Normocephalic, atraumatic, EOMI, PERRLA, no scleral icterus, no pallor, moist oral mucosa  Neck:  Supple, no masses, no thyromegaly, no bruits Normal range of motion  Lymph nodes:  No lymphadenopathy  Cardiovascular: Normal S1S2, RRR, No murmurs/rubs/gallops appreciated  Pulmonary: Clear to auscultation bilaterally, No rhonchi/rales/wheezing appreciated  Abdominal: Soft, Bowel sounds present, Non-tender, Non-distended, No rebound/guarding, no hepatomegaly   Musculoskeletal: No tenderness/abnormality   Extremities:   Bilateral lower extremity edema, Peripheral pulses palpable and equal bilaterally  Neurological: Cranial nerves II-XII grossly intact, sensation intact, otherwise no focal neurological symptoms  Skin:  Left foot with necrotic area to heal, necrotic area to her 3rd and 4th toes    Additional Data:     Labs:      Results from last 7 days  Lab Units 03/15/18  0546  03/14/18  1450   WBC Thousand/uL 10 32*  --  12 15*   HEMOGLOBIN g/dL 11 6*  --  12 4   HEMATOCRIT % 35 6*  --  37 9   PLATELETS Thousands/uL 306  < > 267   NEUTROS PCT %  --   --  79*   LYMPHS PCT %  --   --  14   MONOS PCT %  --   --  5   EOS PCT %  --   --  2   < > = values in this interval not displayed  Results from last 7 days  Lab Units 03/15/18  0546   SODIUM mmol/L 135*   POTASSIUM mmol/L 4 0   CHLORIDE mmol/L 101   CO2 mmol/L 25   BUN mg/dL 13   CREATININE mg/dL 1 08   CALCIUM mg/dL 8 8   TOTAL PROTEIN g/dL 7 6   BILIRUBIN TOTAL mg/dL 0 37   ALK PHOS U/L 50   ALT U/L 20   AST U/L 22   GLUCOSE RANDOM mg/dL 114       Results from last 7 days  Lab Units 03/14/18  1450   INR  1 06        I Have Reviewed All Lab Data Listed Above  Imaging:    Imaging Reports Reviewed Today Include:   No new images      Recent Cultures (last 7 days):           Last 24 Hours Medication List:     Current Facility-Administered Medications:  acetaminophen 650 mg Oral Q6H PRN Deisy Mendoza MD    aspirin 325 mg Oral Daily Deisy Mendoza MD    atorvastatin 40 mg Oral Daily Deisy Mendoza MD    cefTRIAXone 1,000 mg Intravenous Q24H Deisy Mendoza MD Last Rate: 1,000 mg (03/14/18 2017)   enoxaparin 40 mg Subcutaneous Daily Deisy Mendoza MD    insulin glargine 70 Units Subcutaneous HS Deisy Mendoza MD    insulin lispro 1-5 Units Subcutaneous TID AC Deisy Mendoza MD    LORazepam 0 5 mg Oral BID PRN Deisy Mendoza MD    metoclopramide 5 mg Oral Daily Deisy Mendoza MD    metroNIDAZOLE 500 mg Intravenous Q8H Deisy Mendoza MD Last Rate: 500 mg (03/15/18 0930)   midodrine 5 mg Oral TID Deisy Mendoza MD    morphine injection 2 mg Intravenous Q4H PRN Deisy Mendoza MD    ondansetron 4 mg Intravenous Q6H PRN Deisy Mendoza MD    silver sulfadiazine  Topical Daily Deisy Mendoza MD         Today, Patient Was Seen By: Deisy Mendoza MD

## 2018-03-15 NOTE — CASE MANAGEMENT
Initial Clinical Review    Admission: Date/Time/Statement: 3/14/18 @ 1603     Orders Placed This Encounter   Procedures    Inpatient Admission (expected length of stay for this patient is greater than two midnights)     Standing Status:   Standing     Number of Occurrences:   1     Order Specific Question:   Admitting Physician     Answer:   Waleska Bills     Order Specific Question:   Level of Care     Answer:   Med Surg [16]     Order Specific Question:   Estimated length of stay     Answer:   More than 2 Midnights     Order Specific Question:   Certification     Answer:   I certify that inpatient services are medically necessary for this patient for a duration of greater than two midnights  See H&P and MD Progress Notes for additional information about the patient's course of treatment  ED: Date/Time/Mode of Arrival:   ED Arrival Information     Expected Arrival Acuity Means of Arrival Escorted By Service Admission Type    - 3/14/2018 14:04 Emergent Walk-In Family Member General Medicine Emergency    Arrival Complaint    L foot pain          Chief Complaint:   Chief Complaint   Patient presents with    Wound Check     Reportsto have gangerous wound on L heal area; sent into ED by PCP  History of Illness:  61 y o  male who presents with evaluation of left lower extremity wound and chest pain  Patient has medical history significant for diabetes mellitus, hyperlipidemia, hypothyroidism, gastroparesis, diabetic autonomic neuropathy, diabetic retinopathy, peripheral neuropathy, patient lives at home with his wife and uses a walker to walk  Patient states he uses a walker due to his peripheral neuropathy  Patient states that about 1 month ago he began having a wound to his left lower extremity unsure how it started  Patient has been following his PCP and has progressively worsened  Patient denies any fever, chills, nausea, vomiting, diarrhea, abdominal pain   PCP was concerned and patient was sent for further evaluation in the Ed      Patient is also complaining of having 6 month history of intermittent chest pain, described as 5/10 on pain scale, localized to the left/mid sternal region, described as pressure-like in nature  Pain is worse with exertion and lying flat  Patient also had associated dyspnea, orthopnea and PND  Patient states he has to sleep upright in a reclining position  He denies any palpitations or diaphoresis      Patient had an echocardiogram on 02/28/2017 at [facility] which revealed ejection fraction of 60% and no regional wall motion abnormalities  Patient denies any history of any CAD  Large dry, blackish, gangrenous wound at left heel     ED Vital Signs:   ED Triage Vitals   Temperature Pulse Respirations Blood Pressure SpO2   03/14/18 1407 03/14/18 1407 03/14/18 1407 03/14/18 1407 03/14/18 1409   98 2 °F (36 8 °C) 85 18 107/59 96 %      Temp Source Heart Rate Source Patient Position - Orthostatic VS BP Location FiO2 (%)   03/14/18 1407 03/14/18 1511 03/14/18 1511 03/14/18 1511 --   Oral Monitor Sitting Right arm       Pain Score       03/14/18 1407       9        Wt Readings from Last 1 Encounters:   03/15/18 117 kg (258 lb 9 6 oz)       Vital Signs (abnormal):   03/14/18 1557 -- 90 20  181/79 96 %     Abnormal Labs/Diagnostic Test Results:  Sed Rate 100,   CRP 79 5,   NT-proBNP 527,    Na 135,   Cl 97,   Glu 204,   Alb 2 8,   Trop 0 56,   Wbc's 12 15,   Abs neut 9 60    XRAY Left Foot:  No osteomyelitis identified   Soft tissue swelling   Atherosclerosis   Posterior calcaneal bone spur formation  EKG:  Normal sinus rhythm  ST & T wave abnormality, consider lateral ischemia    ED Treatment:   Medication Administration from 03/14/2018 1403 to 03/14/2018 1658     None          Past Medical/Surgical History:    Active Ambulatory Problems     Diagnosis Date Noted    Anxiety and depression 12/28/2017    Benign essential HTN 01/11/2018    Diabetic autonomic neuropathy associated with type 2 diabetes mellitus ([place] [id number] ) 04/11/2017    Diabetic neuropathy, type II diabetes mellitus ([place] [address] ) 11/06/2014    Diabetic peripheral neuropathy ([place] [address] ) 12/28/2017    Hyperlipidemia 02/11/2015    Type 2 diabetes mellitus with diabetic autonomic neuropathy, with long-term current use of insulin ([place] [address] ) 12/28/2017    Vitamin D deficiency 09/20/2016    Peripheral vascular disease ([place] [address] ) 03/14/2018    Gangrene ([place] [address] ) 03/14/2018     Resolved Ambulatory Problems     Diagnosis Date Noted    Diabetic ulcer of left heel associated with type 2 diabetes mellitus, limited to breakdown of skin ([place] [address] ) 02/20/2018     Past Medical History:   Diagnosis Date    Anemia     Autonomic neuropathy     Diabetes mellitus ([place] [address] )     Diabetic autonomic neuropathy associated with type 2 diabetes mellitus (HCC)     Orthostatic hypotension        Admitting Diagnosis: Dyspnea [R06 00]  Hyperglycemia [R73 9]  Elevated troponin [R74 8]  Visit for wound check [Z51 89]  T wave inversion in EKG [R94 31]  Non-healing wound of left heel [S91 302A]    Age/Sex: 61 y o  male    Assessment/Plan:   Principal Problem:    Non-healing wound of left heel  Active Problems:    Anxiety and depression    Benign essential HTN    Diabetic autonomic neuropathy associated with type 2 diabetes mellitus (HCC)    Diabetic neuropathy, type II diabetes mellitus (HCC)    Diabetic peripheral neuropathy (HCC)    Hyperlipidemia    Peripheral vascular disease (HCC)    Dyspnea    Hyperglycemia    Elevated troponin    T wave inversion in EKG     1 )  Left foot infection  -patient has an ongoing left heel infection for the past 1 month which is progressively worsened the past 2 weeks with eschar and gangrenous formation  -patient had lower extremity Dopplers which were negative for DVT  -patient also had lower extremity arterial Dopplers on 03/06/18 which revealed diffuse atherosclerotic disease noted throughout the femoral popliteal arteries suggested of tibioperoneal disease  -patient has palpable pulses in bilateral lower extremities and skin is warm to touch  -there is concern of gangrenous formation of the heel and also the tips of the left foot  -x-ray is negative for any osteomyelitis, , CRP 79 5  -podiatry consult will be appreciated  -vascular consult will be appreciated  -further intervention forefoot will need to be discussed with Cardiology and further evaluated due to his chest pain and elevated troponin, at this point patient would not be a surgical candidate until cardiac status is stable  -will start patient on empiric antibiotics with mild leukocytosis, ceftriaxone and Flagyl     2 )  Chest pain  -rule out ACS patient has significant risk factors including age, diabetes with multiple complications  -1st set of troponin 0 56, will get serial troponin and EKG  -telemetry monitoring   -aspirin, statin  -morphine as needed for pain  -will hold any beta blocker at this point given patient has history of diabetic autonomic neuropathy with history of hypotension and requiring midodrine  -cardiology consult will be appreciated     3 )  Diabetes mellitus  -patient takes Lantus 70 units b i d  will continue this regimen, monitor Accu-Cheks and sliding scale for coverage     4 )  Gastroparesis  -continue with Reglan, Zofran as needed     5 )  Diabetic autonomic neuropathy  -continue with midodrine     6 )  Rule out new onset heart failure  -given patient's lower extremity edema, orthopnea, PND and intermittent chest pain there is concern of underlying cardiomyopathy whether this is ischemic or nonischemic  -BNP is slightly elevated at 527, troponin 0 56  -will get 2D echo for further evaluation     VTE Prophylaxis: Enoxaparin (Lovenox)  / sequential compression device   Code Status:  Full  POLST: There is no POLST form on file for this patient (pre-hospital)  Anticipated Length of Stay:  Patient will be admitted on an Inpatient basis with an anticipated length of stay of  greater than 2 midnights  Justification for Hospital Stay:  Chest pain, left lower extremity ulcer    Admission Orders:  IP  TELE  Cont trops x 3  ECHO  Consult Cardio  Consult Vascular  Consult Podiatry  PT / OT Eval  SCD's  IR Consult  Scheduled Meds:   Current Facility-Administered Medications:  acetaminophen 650 mg Oral Q6H PRN Niraj Weathers MD    aspirin 325 mg Oral Daily Niraj Weathers MD    atorvastatin 40 mg Oral Daily Niraj Weathers MD    cefTRIAXone 1,000 mg Intravenous Q24H Niraj Weathers MD Last Rate: 1,000 mg (03/14/18 2017)   enoxaparin 40 mg Subcutaneous Daily Niraj Weathers MD    insulin glargine 70 Units Subcutaneous HS Nirja Weathers MD    insulin lispro 1-5 Units Subcutaneous TID AC Niraj Weathers MD    LORazepam 0 5 mg Oral BID PRN Niraj Weathers MD    metoclopramide 5 mg Oral Daily Niraj Weathers MD    metroNIDAZOLE 500 mg Intravenous Q8H Niraj Weathers MD Last Rate: 500 mg (03/15/18 0930)   midodrine 5 mg Oral TID Niraj Weathers MD    morphine injection 2 mg Intravenous Q4H PRN Niraj Weathers MD    ondansetron 4 mg Intravenous Q6H PRN Niraj Weathers MD    silver sulfadiazine  Topical Daily Niraj Weathers MD      Continuous Infusions:    PRN Meds:   acetaminophen    LORazepam    morphine injection    ondansetron  Trops  0 32,   0 43  99 9 - 85 - 20   128/72  3/15  98 3 - 75 - 18   160/85    To 87/58  Abn labs: na 135,   Alb 2 5,   Wbc's 10 32,   H&H 11 6 / 35 6  Bolus 500 ccs IVF x 1  NPO - sips with meds    Thank you,  [address] [place] [address] in the [facility] by Preston Ty for 2017  Network Utilization Review Department  Phone: [phone]; Fax [phone]  ATTENTION: The Network Utilization Review Department is now centralized for our 7 Facilities  Make a note that we have a new phone and fax numbers for our Department   Please call with any questions or concerns to [phone] and carefully follow the prompts so that you are directed to the right person  All voicemails are confidential  Fax any determinations, approvals, denials, and requests for initial or continue stay review clinical to [phone]  Due to HIGH CALL volume, it would be easier if you could please send faxed requests to expedite your requests and in part, help us provide discharge notifications faster

## 2018-03-15 NOTE — PLAN OF CARE
Problem: DISCHARGE PLANNING - CARE MANAGEMENT  Goal: Discharge to post-acute care or home with appropriate resources  INTERVENTIONS:  - Conduct assessment to determine patient/family and health care team treatment goals, and need for post-acute services based on payer coverage, community resources, and patient preferences, and barriers to discharge  - Address psychosocial, clinical, and financial barriers to discharge as identified in assessment in conjunction with the patient/family and health care team  - Arrange appropriate level of post-acute services according to patients   needs and preference and payer coverage in collaboration with the physician and health care team  - Communicate with and update the patient/family, physician, and health care team regarding progress on the discharge plan  - Arrange appropriate transportation to post-acute venues  Outcome: Progressing  It is unclear if pt will have any discharge needs at this time  Plan is home with spouse  Will follow

## 2018-03-15 NOTE — PLAN OF CARE
Problem: PHYSICAL THERAPY ADULT  Goal: Performs mobility at highest level of function for planned discharge setting  See evaluation for individualized goals  Treatment/Interventions: Functional transfer training, LE strengthening/ROM, Elevations, Therapeutic exercise, Endurance training, Patient/family training, Equipment eval/education, Bed mobility, Gait training, Spoke to nursing, OT  Equipment Recommended: Everardo Acosta, Other (Comment) (RW, Commode)       See flowsheet documentation for full assessment, interventions and recommendations  Prognosis: Fair  Problem List: Decreased strength, Decreased endurance, Impaired balance, Decreased mobility, Decreased safety awareness, Decreased skin integrity, Impaired sensation  Assessment: Pt is 61 y o  male seen for PT evaluation s/p admit to [address] [address] on 3/14/2018  Two pt identifiers were used to confirm  Pt presented w/ LLE wound and increased chest pain  Pt was admitted with a primary dx of: non healing L foot wound, NSTEMI, DM  Radha Cisneros PT now consulted for assessment of mobility and d/c needs  Pts current co morbidities effecting treatment include: Anemia, autonomic neuropathy, DM, orthostatic hypotension, and personal factors including VITOR home and steps to manage inside the home   Pts current clinical presentation is Unstable/ Unpredictable (high complexity) due to Ongoing medical management for primary dx, Increased reliance on more restrictive AD compared to baseline, Decreased activity tolerance compared to baseline, Fall risk, Ongoing telemetry monitoring, Trending lab values    Prior to admission, pt was I with ambulation with the use of a rollator  Upon evaluation, pt currently is requiring min A for bed mobility; min A for transfers and S for ambulation w/ RW   Pt denies any lightheadedness and dizziness with ambulation   Pt presents at PT eval functioning below baseline and currently w/ overall mobility deficits 2* to: BLE weakness, impaired balance, decreased endurance, gait deviations, decreased activity tolerance compared to baseline, decreased safety awareness, fall risk, decreased skin integrity  Pt currently at a fall risk 2* to impairments listed above  Pt will continue to benefit from skilled PT interventions to address stated impairments; to maximize functional mobility; for ongoing pt/ family training; and DME needs  At conclusion of PT session pt returned back in chair with phone and call bell within reach  Pt denies any further questions at this time  PT is currently recommending home PT, however pt is unaware if he will be able to afford this at this time  Pt/ family agreeable to plan and goals as stated on evaluation  PT will continue to follow during hospital stay  Barriers to Discharge: Decreased caregiver support     Recommendation: Home PT, Other (Comment) (Pt states that he may not be able to afford home PT )     PT - OK to Discharge: Yes (when medically cleared )    See flowsheet documentation for full assessment

## 2018-03-15 NOTE — CONSULTS
VASCULAR CONSULT Note - Russel Amin 1960, 61 y o  male MRN: [id number]    Unit/Bed#: E4 -01 Encounter: [id number]    Primary Care Provider: Darell Hagan DO   Date and time admitted to hospital: 3/14/2018  2:14 PM        Peripheral vascular disease [facility])   Assessment & Plan    Patient being followed at his primary care provider for wounds to the left foot  He is an uncontrolled diabetic with peripheral neuropathy  He has multiple toes with eschar and a large lateral plantar eschar to the Left foot and was sent to ED 3/13/18 by PCP for w/u  --LEADs done 03/06/2018   -diffuse disease in the femoral popliteal arteries bilaterally with suggested tibioperoneal disease  -ABIs:  RIGHT:  1 31/172/108    LEFT: 1 7/103/51 (supra normal, consistent with poorly compressible vessels  )  --dopplerable biphasic signals bilaterally at AT/DP/PT/Peroneal  --palpable femoral pulses bilaterally  --patient on aspirin and statin  --podiatry providing local wound care; may need debridement  --Poorly controlled DM and poor nutrition, concern for wound healing  --will need arteriogram with lower extremity runoff and possible intervention  --will discuss with Dr Delano Ocampo        Hyperlipidemia   Assessment & Plan    Continue statin therapy        Diabetic neuropathy, type II diabetes mellitus ([address] )   Assessment & Plan    Poorly controlled diabetic, with recent A1c of 9 4  --primary team managing        * Non-healing wound of left heel   Assessment & Plan    wound to left plantar foot with eschar/necrosis  --patient afebrile, nontoxic appearance, WBC count 10 32 today  --left foot x-ray shows no signs of osteomyelitis  --podiatry following with local wound care, may need debridement  --patient on prophylactic antibiotics per primary team  --discussed with Dr Armstrong Nurse and podiatry          Consult Note - Vascular Surgery     Consulting Service: SLIM    Chief Complaint: Left foot eschar to plantar aspect with erythema    HPI: Kennedy Manzanares is a 61 y o  male who has a significant past medical history of uncontrolled diabetes with multiple diabetic adverse effects including peripheral neuropathy, hypertension, hyperlipidemia presents with left foot wound to the heel and toes with distal deep tissue injury versus necrosis  He was sent in by his primary care provider who had been following along with this heel wound  On arrival to his office yesterday was noted that the heel wound was much larger and may need vascular and Podiatry intervention  Patient reports that this wound on the left heel has been ongoing for about a month  He has no sensation in either of his feet  He does walk at home but does not ambulate much  He reports that he has some financial strain and has been unable to follow up with physicians, take medications appropriately, or get necessary testing done  Recently he had a arterial duplex done which indicated diffuse femoral popliteal artery disease and tibioperoneal disease  This test was done on 03/06/2018 with ABIs noted to be right: 1 31/172/108 and left: 1 7/103/51  Patient denies any pain to left foot  He does report some chest pain and shortness of breath which are new over the past several weeks and primary team is continuing workup  Patient does have palpable femoral pulses bilaterally, and dopplerable signals in bilateral feet          Review of Systems:  General: negative  Cardiovascular: positive for - chest pain and shortness of breath  Respiratory: positive for - shortness of breath  Gastrointestinal: positive for - gas/bloating and gastroparesis  Genitourinary ROS: no dysuria, trouble voiding, or hematuria  Musculoskeletal ROS: negative  Neurological ROS: no TIA or stroke symptoms  Hematological and Lymphatic ROS: negative  Dermatological ROS: Area of eschar/necrosis to left plantar aspect of foot, DTI/necrotic areas to distal toes on the left foot as well  Psychological ROS: negative  Ophthalmic ROS: negative  ENT ROS: negative    Past Medical History:  Past Medical History:   Diagnosis Date    Anemia     Autonomic neuropathy     Diabetes mellitus ([facility] )     Diabetic autonomic neuropathy associated with type 2 diabetes mellitus ([facility] )     Last Assessed: 12/28/2017    Orthostatic hypotension        Past Surgical History:  No past surgical history on file  Social History:  History   Alcohol Use No     History   Drug Use No     History   Smoking Status    Never Smoker   Smokeless Tobacco    Never Used     Comment: Former smoker per [employer]       Family History:  Family History   Problem Relation Age of Onset    No Known Problems Mother        Allergies:   Allergies   Allergen Reactions    Metformin        Medications:  Current Facility-Administered Medications   Medication Dose Route Frequency    acetaminophen (TYLENOL) tablet 650 mg  650 mg Oral Q6H PRN    aspirin (ECOTRIN) EC tablet 325 mg  325 mg Oral Daily    atorvastatin (LIPITOR) tablet 40 mg  40 mg Oral Daily    cefTRIAXone (ROCEPHIN) IVPB (premix) 1,000 mg  1,000 mg Intravenous Q24H    enoxaparin (LOVENOX) subcutaneous injection 40 mg  40 mg Subcutaneous Daily    insulin glargine (LANTUS) subcutaneous injection 70 Units  70 Units Subcutaneous HS    insulin lispro (HumaLOG) 100 units/mL subcutaneous injection 1-5 Units  1-5 Units Subcutaneous TID AC    LORazepam (ATIVAN) tablet 0 5 mg  0 5 mg Oral BID PRN    metoclopramide (REGLAN) tablet 5 mg  5 mg Oral Daily    metroNIDAZOLE (FLAGYL) IVPB (premix) 500 mg  500 mg Intravenous Q8H    midodrine (PROAMATINE) tablet 5 mg  5 mg Oral TID    morphine injection 2 mg  2 mg Intravenous Q4H PRN    ondansetron (ZOFRAN) injection 4 mg  4 mg Intravenous Q6H PRN    silver sulfadiazine (SILVADENE,SSD) 1 % cream   Topical Daily       Vitals:  Vitals:    03/14/18 2305 03/15/18 0525 03/15/18 0600 03/15/18 0739   BP: 119/71 (!) 87/58  160/85   BP Location: Left arm Left arm  Right arm   Pulse: 71 70  75   Resp: 20   18   Temp: 99 8 °F (37 7 °C)   98 3 °F (36 8 °C)   TempSrc: Temporal   Tympanic   SpO2: 96%   98%   Weight:   117 kg (258 lb 9 6 oz)        I/Os:  I/O last 3 completed shifts: In: 480 [P O :480]  Out: 2100 [Urine:2100]  I/O this shift:  In: 240 [P O :240]  Out: -     Lab Results and Cultures:   CBC with diff:   Lab Results   Component Value Date    WBC 10 32 (H) 03/15/2018    HGB 11 6 (L) 03/15/2018    HCT 35 6 (L) 03/15/2018    MCV 85 03/15/2018     03/15/2018    MCH 27 7 03/15/2018    MCHC 32 6 03/15/2018    RDW 13 0 03/15/2018    MPV 10 1 03/15/2018    NRBC 0 03/14/2018   ,   BMP/CMP:  Lab Results   Component Value Date     (L) 03/15/2018     09/13/2016    K 4 0 03/15/2018    K 5 2 09/13/2016     03/15/2018    CL 96 (L) 09/13/2016    CO2 25 03/15/2018    CO2 26 09/13/2016    ANIONGAP 9 03/15/2018    BUN 13 03/15/2018    BUN 19 09/13/2016    CREATININE 1 08 03/15/2018    CREATININE 1 03 09/13/2016    GLUCOSE 114 03/15/2018    GLUCOSE 215 (H) 09/13/2016    CALCIUM 8 8 03/15/2018    CALCIUM 9 3 09/13/2016    AST 22 03/15/2018    AST 22 09/13/2016    ALT 20 03/15/2018    ALT 29 09/13/2016    ALKPHOS 50 03/15/2018    ALKPHOS 53 09/13/2016    PROT 7 6 03/15/2018    PROT 7 4 09/13/2016    BILITOT 0 37 03/15/2018    BILITOT 0 4 09/13/2016    EGFR 75 03/15/2018   ,   Lipid Panel:   Lab Results   Component Value Date    CHOL 169 01/02/2018    CHOL 214 (H) 09/13/2016   ,   Coags:   Lab Results   Component Value Date    PTT 30 03/14/2018    INR 1 06 03/14/2018   ,     Blood Culture: No results found for: BLOODCX,   Urinalysis:   Lab Results   Component Value Date    COLORU Yellow 01/02/2018    CLARITYU Clear 01/02/2018    SPECGRAV 1 025 01/02/2018    PHUR 6 0 01/02/2018    LEUKOCYTESUR (A) 01/02/2018     Elevated glucose may cause decreased leukocyte values   See urine microscopic for [facility] result/    NITRITE Negative 01/02/2018    PROTEINUA Trace (A) 01/02/2018    GLUCOSEU >=1000 (1%) (A) 01/02/2018    KETONESU Negative 01/02/2018    BILIRUBINUR Negative 01/02/2018    BLOODU Negative 01/02/2018   ,   Urine Culture: No results found for: URINECX,   Wound Culure: No results found for: WOUNDCULT    Imaging:  3/8/18 LEADS reviewed as noted above    Physical Exam:    General appearance: alert and oriented, in no acute distress  Head: Normocephalic, without obvious abnormality, atraumatic  Lungs: clear to auscultation bilaterally  Heart: regular rate and rhythm, S1, S2 normal, no murmur, click, rub or gallop  Abdomen: abnormal findings:  distended and obese  Extremities: Positive palpable radial pulses, dopplerable signals in bilateral feet, positive palpable femoral pulses  Extremities are warm and dry  Left foot with necrotic area to toes, necrotic area to left heel    Skin: Skin color, texture, turgor normal  No rashes or lesions or Except as noted above  Neurologic: Grossly normal    Wound/Incision:    LEFT HEEL            Pulse exam:  Radial: Right: 2+ Left[de-identified] 2+  Ulnar: Right: non-palpable Left[de-identified] non-palpable  Femoral: Right: 2+ Left: 2+  Popliteal: Right: non-palpable Left: non-palpable  DP: Right: doppler signal Left: doppler signal  PT: Right: doppler signal Left: doppler signal     DOPPLER SIGNALS:  R: AT/DP/PT/Peroneal biphasic     L: AT/DP/PT/Peroneal biphasic        Elzbieta Hernández, [address]  3/15/2018  [facility]  [phone]

## 2018-03-15 NOTE — PLAN OF CARE
Problem: OCCUPATIONAL THERAPY ADULT  Goal: Performs self-care activities at highest level of function for planned discharge setting  See evaluation for individualized goals  Treatment Interventions: ADL retraining, Functional transfer training, Endurance training, UE strengthening/ROM, Cognitive reorientation, Patient/family training, Equipment evaluation/education, Compensatory technique education, Energy conservation, Activityengagement  Equipment Recommended: Bedside commode       See flowsheet documentation for full assessment, interventions and recommendations  Limitation: Decreased ADL status, Decreased UE strength, Decreased Safe judgement during ADL, Decreased endurance, Decreased cognition, Decreased self-care trans, Decreased high-level ADLs, Decreased sensation  Prognosis: Good  Assessment: Pt is a 61 y o  male seen for OT evaluation s/p admit to [facility] on 3/14/2018 w/ Non-healing wound of left heel and chest pain  Comorbidities affecting pt's functional performance at time of assessment include: DM, neuropathy, CHF, orthostatic hypotension, PVD, PAD  Personal factors affecting pt at time of IE include:alone during day as wife has fulltime and part-time job and works over [address] [place] [address]  Prior to admission, pt was living w/ spouse and reports independent w/ ADLs except difficulties w/ wiping self after toileting, independent w/ functional transfers and mobility w/ RW  Upon evaluation: Pt requires MIN assist sit<>stand w/ VCs for safety, MIN assist supine>sit bed mobility, MIN assist functional mobility w/ RW, MIN-MOD assist LB ADLs 2* the following deficits impacting occupational performance: decreased strength and endurance, impaired balance, impaired activity tolerance, decreased sensation, increased fatigue, dyspnea on exertion   Pt to benefit from continued skilled OT tx while in the hospital to address deficits as defined above and maximize level of functional independence w ADL's and functional mobility  Occupational Performance areas to address include: eating, grooming, bathing/shower, toilet hygiene, dressing, functional mobility and clothing management, energy conservation education, education on adaptive devices and foam for utensils  Educated pt on benefit of BSC for shower and use of toilet aide of hygiene, pt receptive to education  Recommend home w/ HOME OT       OT Discharge Recommendation: Home OT         Comments: Baldo Beebe MS, OTR/L

## 2018-03-16 PROBLEM — R07.89 ATYPICAL CHEST PAIN: Status: ACTIVE | Noted: 2018-03-16

## 2018-03-16 PROBLEM — R77.8 ELEVATED TROPONIN: Status: RESOLVED | Noted: 2018-03-14 | Resolved: 2018-03-16

## 2018-03-16 PROBLEM — R06.00 DYSPNEA: Status: RESOLVED | Noted: 2018-03-14 | Resolved: 2018-03-16

## 2018-03-16 PROBLEM — I95.1 ORTHOSTATIC HYPOTENSION: Status: ACTIVE | Noted: 2018-03-16

## 2018-03-16 PROBLEM — R79.89 ELEVATED TROPONIN: Status: RESOLVED | Noted: 2018-03-14 | Resolved: 2018-03-16

## 2018-03-16 LAB
ANION GAP SERPL CALCULATED.3IONS-SCNC: 10 MMOL/L (ref 4–13)
BASOPHILS # BLD AUTO: 0.05 THOUSANDS/ΜL (ref 0–0.1)
BASOPHILS NFR BLD AUTO: 1 % (ref 0–1)
BUN SERPL-MCNC: 12 MG/DL (ref 5–25)
CALCIUM SERPL-MCNC: 8.9 MG/DL (ref 8.3–10.1)
CHLORIDE SERPL-SCNC: 99 MMOL/L (ref 100–108)
CHOLEST SERPL-MCNC: 85 MG/DL (ref 50–200)
CO2 SERPL-SCNC: 25 MMOL/L (ref 21–32)
CREAT SERPL-MCNC: 1.02 MG/DL (ref 0.6–1.3)
EOSINOPHIL # BLD AUTO: 0.26 THOUSAND/ΜL (ref 0–0.61)
EOSINOPHIL NFR BLD AUTO: 2 % (ref 0–6)
ERYTHROCYTE [DISTWIDTH] IN BLOOD BY AUTOMATED COUNT: 12.9 % (ref 11.6–15.1)
GFR SERPL CREATININE-BSD FRML MDRD: 81 ML/MIN/1.73SQ M
GLUCOSE SERPL-MCNC: 127 MG/DL (ref 65–140)
GLUCOSE SERPL-MCNC: 177 MG/DL (ref 65–140)
GLUCOSE SERPL-MCNC: 178 MG/DL (ref 65–140)
GLUCOSE SERPL-MCNC: 206 MG/DL (ref 65–140)
GLUCOSE SERPL-MCNC: 217 MG/DL (ref 65–140)
GLUCOSE SERPL-MCNC: 256 MG/DL (ref 65–140)
HCT VFR BLD AUTO: 30.3 % (ref 36.5–49.3)
HDLC SERPL-MCNC: 33 MG/DL (ref 40–60)
HGB BLD-MCNC: 9.9 G/DL (ref 12–17)
LDLC SERPL CALC-MCNC: 39 MG/DL (ref 0–100)
LYMPHOCYTES # BLD AUTO: 2.04 THOUSANDS/ΜL (ref 0.6–4.47)
LYMPHOCYTES NFR BLD AUTO: 19 % (ref 14–44)
MCH RBC QN AUTO: 27.9 PG (ref 26.8–34.3)
MCHC RBC AUTO-ENTMCNC: 32.7 G/DL (ref 31.4–37.4)
MCV RBC AUTO: 85 FL (ref 82–98)
MONOCYTES # BLD AUTO: 0.75 THOUSAND/ΜL (ref 0.17–1.22)
MONOCYTES NFR BLD AUTO: 7 % (ref 4–12)
NEUTROPHILS # BLD AUTO: 7.92 THOUSANDS/ΜL (ref 1.85–7.62)
NEUTS SEG NFR BLD AUTO: 71 % (ref 43–75)
NRBC BLD AUTO-RTO: 0 /100 WBCS
PLATELET # BLD AUTO: 295 THOUSANDS/UL (ref 149–390)
PMV BLD AUTO: 10.4 FL (ref 8.9–12.7)
POTASSIUM SERPL-SCNC: 4.2 MMOL/L (ref 3.5–5.3)
RBC # BLD AUTO: 3.55 MILLION/UL (ref 3.88–5.62)
SODIUM SERPL-SCNC: 134 MMOL/L (ref 136–145)
TRIGL SERPL-MCNC: 64 MG/DL
WBC # BLD AUTO: 11.02 THOUSAND/UL (ref 4.31–10.16)

## 2018-03-16 PROCEDURE — 85025 COMPLETE CBC W/AUTO DIFF WBC: CPT | Performed by: INTERNAL MEDICINE

## 2018-03-16 PROCEDURE — 99232 SBSQ HOSP IP/OBS MODERATE 35: CPT | Performed by: INTERNAL MEDICINE

## 2018-03-16 PROCEDURE — 82948 REAGENT STRIP/BLOOD GLUCOSE: CPT

## 2018-03-16 PROCEDURE — 99232 SBSQ HOSP IP/OBS MODERATE 35: CPT | Performed by: NURSE PRACTITIONER

## 2018-03-16 PROCEDURE — 80048 BASIC METABOLIC PNL TOTAL CA: CPT | Performed by: INTERNAL MEDICINE

## 2018-03-16 PROCEDURE — 80061 LIPID PANEL: CPT | Performed by: PHYSICIAN ASSISTANT

## 2018-03-16 RX ORDER — SODIUM CHLORIDE 9 MG/ML
50 INJECTION, SOLUTION INTRAVENOUS CONTINUOUS
Status: DISCONTINUED | OUTPATIENT
Start: 2018-03-19 | End: 2018-03-20

## 2018-03-16 RX ADMIN — ENOXAPARIN SODIUM 40 MG: 40 INJECTION SUBCUTANEOUS at 10:00

## 2018-03-16 RX ADMIN — INSULIN GLARGINE 70 UNITS: 100 INJECTION, SOLUTION SUBCUTANEOUS at 22:15

## 2018-03-16 RX ADMIN — METRONIDAZOLE 500 MG: 500 INJECTION, SOLUTION INTRAVENOUS at 18:34

## 2018-03-16 RX ADMIN — INSULIN LISPRO 1 UNITS: 100 INJECTION, SOLUTION INTRAVENOUS; SUBCUTANEOUS at 12:37

## 2018-03-16 RX ADMIN — ATORVASTATIN CALCIUM 40 MG: 40 TABLET, FILM COATED ORAL at 18:34

## 2018-03-16 RX ADMIN — ASPIRIN 325 MG: 325 TABLET, COATED ORAL at 10:00

## 2018-03-16 RX ADMIN — INSULIN LISPRO 2 UNITS: 100 INJECTION, SOLUTION INTRAVENOUS; SUBCUTANEOUS at 18:36

## 2018-03-16 RX ADMIN — METRONIDAZOLE 500 MG: 500 INJECTION, SOLUTION INTRAVENOUS at 08:48

## 2018-03-16 RX ADMIN — METOCLOPRAMIDE HYDROCHLORIDE 5 MG: 10 TABLET ORAL at 08:48

## 2018-03-16 RX ADMIN — CEFTRIAXONE 1000 MG: 1 INJECTION, SOLUTION INTRAVENOUS at 19:19

## 2018-03-16 RX ADMIN — METRONIDAZOLE 500 MG: 500 INJECTION, SOLUTION INTRAVENOUS at 01:26

## 2018-03-16 NOTE — PROGRESS NOTES
Progress Note - Lisa Henderson 61 y o  male MRN: [id number]    Unit/Bed#: E4 -01 Encounter: [id number]  Subjective:   No chest pain or shortness of breath  Denies dizziness  Some edema  No palpitation    Objective:   Vitals: Blood pressure 160/80, pulse 74, temperature 97 9 °F (36 6 °C), temperature source Temporal, resp  rate 18, height 6' 1" (1 854 m), weight 117 kg (258 lb 9 6 oz), SpO2 95 %  ,Body mass index is 34 12 kg/m²  CBC with diff:   Results from last 7 days  Lab Units 03/16/18  0518   WBC Thousand/uL 11 02*   RBC Million/uL 3 55*   HEMOGLOBIN g/dL 9 9*   HEMATOCRIT % 30 3*   MCV fL 85   MCH pg 27 9   MCHC g/dL 32 7   RDW % 12 9   MPV fL 10 4   PLATELETS Thousands/uL 295     CMP:   Results from last 7 days  Lab Units 03/16/18  0518 03/15/18  0546   SODIUM mmol/L 134* 135*   POTASSIUM mmol/L 4 2 4 0   CHLORIDE mmol/L 99* 101   CO2 mmol/L 25 25   ANION GAP mmol/L 10 9   BUN mg/dL 12 13   CREATININE mg/dL 1 02 1 08   GLUCOSE RANDOM mg/dL 177* 114   CALCIUM mg/dL 8 9 8 8   AST U/L  --  22   ALT U/L  --  20   ALK PHOS U/L  --  50   TOTAL PROTEIN g/dL  --  7 6   BILIRUBIN TOTAL mg/dL  --  0 37   EGFR ml/min/1 73sq m 81 75     Lipid Profile:   Results from last 7 days  Lab Units 03/16/18  0518   HDL mg/dL 33*   CHOLESTEROL mg/dL 85   LDL CALC mg/dL 39   TRIGLYCERIDES mg/dL 64           Physical exam:  Lungs-clear without wheezing rhonchi rales  Heart-regular without murmur rub or gallop  Abdomen-obese  Nontender without mass organomegaly  Extremities-1+ pedal edema with absent distal pulses      Assessment:  1  Atypical chest pain  Noncardiac  Has not recurred  2  Mild elevation of troponin  I am suspicious of underlying CAD despite negative stress test for ischemia in 2016  Patient with multiple risk factors did have EKG changes on presentation  3  Orthostatic hypotension  I have stopped midodrine  Blood pressure remains high but hesitant to treat in light of orthostasis  4    Chronic peripheral edema  Mild  No specific treatment  5   PVD with compromised circulation  For eventual angiogram  6  Diabetes mellitus  7   Hyperlipidemia  Well controlled on atorvastatin 40 mg daily    Plan:  Observe off midodrine for orthostasis  If blood pressure can tolerate would like to add very low-dose beta-blocker  No specific treatment of edema  If surgery is planned, would favor repeating functional testing in light of high suspicion for CAD    Will see with angiogram shows 1st  Continue statin    Nash Musa MD

## 2018-03-16 NOTE — PROGRESS NOTES
Progress Note - Saranya Palmer 61 y o  male MRN: [id number]    Unit/Bed#: E4 -01 Encounter: [id number]    Assessment/Plan:    Left heel wound  negative for osteomyelitis continue IV antibiotics, IR consulted for possible lower extremity vascular intervention continue to monitor with Podiatry and vascular surgery    Chest pain   patient seen by Cardiology, suspicious for underlying coronary artery disease consider repeating functional stress testing, reviewed yesterday's echocardiogram report    Diabetes   good control glucose 127 to 206 continue Lantus ADA diet and sliding scale    Autonomic dysfunction continue symptom control with Reglan Zofran and strict glucose control    Dyslipidemia   continue statin for LDL control    Subjective:   Feels okay, denies chest pain shortness nausea vomiting diarrhea no fevers chills appetite is okay today    Objective:     Vitals: Blood pressure 106/60, pulse 73, temperature (!) 97 3 °F (36 3 °C), temperature source Temporal, resp  rate 18, height 6' 1" (1 854 m), weight 117 kg (258 lb 9 6 oz), SpO2 98 %  ,Body mass index is 34 12 kg/m²  Results from last 7 days  Lab Units 03/16/18  0518  03/14/18  1450   WBC Thousand/uL 11 02*  < > 12 15*   HEMOGLOBIN g/dL 9 9*  < > 12 4   HEMATOCRIT % 30 3*  < > 37 9   PLATELETS Thousands/uL 295  < > 267   INR   --   --  1 06   < > = values in this interval not displayed      Results from last 7 days  Lab Units 03/16/18  0518 03/15/18  0546   SODIUM mmol/L 134* 135*   POTASSIUM mmol/L 4 2 4 0   CHLORIDE mmol/L 99* 101   CO2 mmol/L 25 25   BUN mg/dL 12 13   CREATININE mg/dL 1 02 1 08   CALCIUM mg/dL 8 9 8 8   TOTAL PROTEIN g/dL  --  7 6   BILIRUBIN TOTAL mg/dL  --  0 37   ALK PHOS U/L  --  50   ALT U/L  --  20   AST U/L  --  22   GLUCOSE RANDOM mg/dL 177* 114       Scheduled Meds:    Current Facility-Administered Medications:  acetaminophen 650 mg Oral Q6H PRN Deisy Mendoza MD    aspirin 325 mg Oral Daily Deisy Mendoza MD atorvastatin 40 mg Oral Daily Aleks Bravo MD    cefTRIAXone 1,000 mg Intravenous Q24H Aleks Bravo MD Last Rate: 1,000 mg (03/15/18 1745)   enoxaparin 40 mg Subcutaneous Daily Aleks Bravo MD    insulin glargine 70 Units Subcutaneous HS Aleks Bravo MD    insulin lispro 1-5 Units Subcutaneous TID AC Aleks Barvo MD    LORazepam 0 5 mg Oral BID PRN Aleks Bravo MD    metoclopramide 5 mg Oral Daily Aleks Bravo MD    metroNIDAZOLE 500 mg Intravenous Q8H Aleks Bravo MD Last Rate: 500 mg (03/16/18 0848)   morphine injection 2 mg Intravenous Q4H PRN Aleks Bravo MD    ondansetron 4 mg Intravenous Q6H PRN Aleks Bravo MD    silver sulfadiazine  Topical Daily Aleks Bravo MD    [START ON 3/19/2018] sodium chloride 50 mL/hr Intravenous Continuous CARLTON Pang        Continuous Infusions:    [START ON 3/19/2018] sodium chloride 50 mL/hr     Physical exam:  General appearance:  Alert no distress interaction appropriate   Head/Eyes:  Nonicteric PERRL EOMI  Neck:  Supple  Lungs: CTA bilateral no wheezing rhonchi or rales  Heart: normal S1 S2 regular  Abdomen:  Obese nontender with bowel sounds  Extremities:  Chronic edema  Skin: no rash    Invasive Devices     Peripheral Intravenous Line            Peripheral IV 03/14/18 Right Antecubital 1 day                  VTE Pharmacologic Prophylaxis:  Lovenox   VTE Mechanical Prophylaxis:  Lovenox                     Counseling / Coordination of Care  Total floor / unit time spent today  30  minutes  Greater than 50% of total time was spent with the patient and / or family counseling and / or coordination of care    A description of the counseling / coordination of care:

## 2018-03-16 NOTE — ASSESSMENT & PLAN NOTE
wound to left plantar foot with eschar/necrosis  --patient afebrile, nontoxic appearance, WBC count 11 02 today  --left foot x-ray shows no signs of osteomyelitis  --podiatry following with local wound care, may need debridement  --patient on Rocephin and Flagyl per primary team

## 2018-03-16 NOTE — PLAN OF CARE
DISCHARGE PLANNING     Discharge to home or other facility with appropriate resources Progressing        DISCHARGE PLANNING - CARE MANAGEMENT     Discharge to post-acute care or home with appropriate resources Progressing        INFECTION - ADULT     Absence or prevention of progression during hospitalization Progressing     Absence of fever/infection during neutropenic period Progressing        Knowledge Deficit     Patient/family/caregiver demonstrates understanding of disease process, treatment plan, medications, and discharge instructions Progressing        Nutrition/Hydration-ADULT     Nutrient/Hydration intake appropriate for improving, restoring or maintaining nutritional needs Progressing        Potential for Falls     Patient will remain free of falls Progressing        Prexisting or High Potential for Compromised Skin Integrity     Skin integrity is maintained or improved Progressing        SAFETY ADULT     Maintain or return to baseline ADL function Progressing     Maintain or return mobility status to optimal level Progressing        SKIN/TISSUE INTEGRITY - ADULT     Skin integrity remains intact Progressing     Incision(s), wounds(s) or drain site(s) healing without S/S of infection Progressing

## 2018-03-16 NOTE — ASSESSMENT & PLAN NOTE
Patient being followed at his primary care provider for wounds to the left foot  He is an uncontrolled diabetic with peripheral neuropathy  He has multiple toes with eschar and a large lateral plantar eschar to the Left foot  --LEADs done 03/06/2018   -diffuse disease in the femoral popliteal arteries bilaterally with suggested tibioperoneal disease  -ABIs:  RIGHT:  1 31/172/108    LEFT: 1 7/103/51 (supra normal, consistent with poorly compressible vessels  )    --dopplerable biphasic signals bilaterally at AT/DP/PT/Peroneal  --palpable femoral pulses bilaterally  --patient on aspirin and statin  --podiatry providing local wound care; may need debridement  --Poorly controlled DM and poor nutrition, concern for wound healing  --Consult to IR    Will need arteriogram with lower extremity runoff and possible intervention  --will discuss with Dr Nydia Ruiz

## 2018-03-16 NOTE — PROGRESS NOTES
Progress Note - Podiatry  Td Addison 61 y o  male MRN: [id number]  Unit/Bed#: E4 -01 Encounter: [id number]    Assessment/Plan:  1  Left foot plantar heel unstageable eschar with associated cellulitis 2/2 DM with neuropathy  2  4th digit DTI     -x-rays of the foot are negative for osteomyelitis, however foot remains edematous  -heel eschar appears to be dry and stable with no drainage, there is some erythema of the foot, concerning for cellulitis  -betadine paint, DSD applied  -c/w ceftriaxone/flagyl per SLIM  -patient had multiple questions about limb loss and whether this could lead to amputation  Advised patient that this was too early to discuss a definitive course of treatment without knowing his final vascular status  -following restoration of blood flow, will likely need OR debridement   -patient is NWB to the LLE, globopedi ordered for protection of foot while in chair     2  Diabetes mellitus type 2 with neuropathy- A1c 9 4%   -management per primary team     3   Peripheral arterial disease  -will need vascular intervention, IR consult placed by vascular  Subjective/Objective   Chief Complaint:   Chief Complaint   Patient presents with    Wound Check     Reportsto have gangerous wound on L heal area; sent into ED by PCP  Subjective: 61 y o  y/o male was seen and evaluated at bedside  Blood pressure 160/80, pulse 74, temperature 97 9 °F (36 6 °C), temperature source Temporal, resp  rate 18, height 6' 1" (1 854 m), weight 117 kg (258 lb 9 6 oz), SpO2 95 %  ,Body mass index is 34 12 kg/m²  Invasive Devices     Peripheral Intravenous Line            Peripheral IV 03/14/18 Right Antecubital 1 day                Physical Exam:   General: Alert, cooperative and no distress  Lungs: Non labored breathing  Heart: Positive S1, S2  Abdomen: Soft, non-tender  Extremity: There is an adherent eschar on the plantar aspect of the left foot  There is some surrounding hyperkeratosis    With malodor  The foot is erythematous, with no ascending cellulitis  The foot is warm to touch  Neurovascular status is at baseline  There is a small eschar on the left 4th toe  There is some maceration to the 3rd and 4th interspace of the left foot  Lab, Imaging and other studies:   I have personally reviewed pertinent lab results  , CBC:   Lab Results   Component Value Date    WBC 11 02 (H) 03/16/2018    HGB 9 9 (L) 03/16/2018    HCT 30 3 (L) 03/16/2018    MCV 85 03/16/2018     03/16/2018    MCH 27 9 03/16/2018    MCHC 32 7 03/16/2018    RDW 12 9 03/16/2018    MPV 10 4 03/16/2018    NRBC 0 03/16/2018   , CMP:   Lab Results   Component Value Date     (L) 03/16/2018    K 4 2 03/16/2018    CL 99 (L) 03/16/2018    CO2 25 03/16/2018    ANIONGAP 10 03/16/2018    BUN 12 03/16/2018    CREATININE 1 02 03/16/2018    GLUCOSE 177 (H) 03/16/2018    CALCIUM 8 9 03/16/2018    EGFR 81 03/16/2018       Imaging: I have personally reviewed pertinent films in PACS  EKG, Pathology, and Other Studies: I have personally reviewed pertinent reports    VTE Pharmacologic Prophylaxis: Enoxaparin (Lovenox)

## 2018-03-16 NOTE — SOCIAL WORK
Met with pt to discuss PT recommendation for home PT   Pt does not want PT at home, but may want RN for wound care if needed  Pt wants to wait to find out what vascular intervention he might need before agreeing to a referral to [facility]  Pt has left heel wound  Will f/u

## 2018-03-16 NOTE — PROGRESS NOTES
Progress Note - Sandra Cui 1960, 61 y o  male MRN: [id number]    Unit/Bed#: E4 -01 Encounter: [id number]    Primary Care Provider: Willie Roblero DO   Date and time admitted to hospital: 3/14/2018  2:14 PM        Peripheral vascular disease [facility])   Assessment & Plan    Patient being followed at his primary care provider for wounds to the left foot  He is an uncontrolled diabetic with peripheral neuropathy  He has multiple toes with eschar and a large lateral plantar eschar to the Left foot  --LEADs done 03/06/2018   -diffuse disease in the femoral popliteal arteries bilaterally with suggested tibioperoneal disease  -ABIs:  RIGHT:  1 31/172/108    LEFT: 1 7/103/51 (supra normal, consistent with poorly compressible vessels  )    --dopplerable biphasic signals bilaterally at AT/DP/PT/Peroneal  --palpable femoral pulses bilaterally  --patient on aspirin and statin  --podiatry providing local wound care; may need debridement  --Poorly controlled DM and poor nutrition, concern for wound healing  --Consult to IR  Will need arteriogram with lower extremity runoff and possible intervention  --will discuss with Dr Jigar Tran        Hyperlipidemia   Assessment & Plan    Continue statin therapy for PAD        Diabetic neuropathy, type II diabetes mellitus ([facility] [address] )   Assessment & Plan    Poorly controlled diabetic, with recent A1c of 9 4  --primary team managing        * Non-healing wound of left heel   Assessment & Plan    wound to left plantar foot with eschar/necrosis  --patient afebrile (tmax 99 9F 3/14), nontoxic appearance, WBC count 11 02 today  --left foot x-ray shows no signs of osteomyelitis  --podiatry following with local wound care, may need debridement  --patient on Rocephin and Flagyl per primary team                  Subjective:  Patient assessed in chair today  He asked for explanation about procedure in IR and questions were answered  He has no new complaints today, except being hungry     NPO order d/c as he is not going to IR today  VSS  Vitals:  /80 (BP Location: Left arm)   Pulse 74   Temp 97 9 °F (36 6 °C) (Temporal)   Resp 18   Ht 6' 1" (1 854 m)   Wt 117 kg (258 lb 9 6 oz)   SpO2 95%   BMI 34 12 kg/m²     I/Os:  I/O last 3 completed shifts: In: 240 [P O :240]  Out: 3150 [Urine:3150]  No intake/output data recorded  Lab Results and Cultures:   Lab Results   Component Value Date    WBC 11 02 (H) 03/16/2018    HGB 9 9 (L) 03/16/2018    HCT 30 3 (L) 03/16/2018    MCV 85 03/16/2018     03/16/2018     Lab Results   Component Value Date    GLUCOSE 177 (H) 03/16/2018    CALCIUM 8 9 03/16/2018     (L) 03/16/2018    K 4 2 03/16/2018    CO2 25 03/16/2018    CL 99 (L) 03/16/2018    BUN 12 03/16/2018    CREATININE 1 02 03/16/2018     Lab Results   Component Value Date    INR 1 06 03/14/2018    PROTIME 13 8 03/14/2018        Blood Culture: No results found for: BLOODCX,   Urinalysis:   Lab Results   Component Value Date    COLORU Yellow 01/02/2018    CLARITYU Clear 01/02/2018    SPECGRAV 1 025 01/02/2018    PHUR 6 0 01/02/2018    LEUKOCYTESUR (A) 01/02/2018     Elevated glucose may cause decreased leukocyte values   See urine microscopic for [facility] result/    NITRITE Negative 01/02/2018    PROTEINUA Trace (A) 01/02/2018    GLUCOSEU >=1000 (1%) (A) 01/02/2018    KETONESU Negative 01/02/2018    BILIRUBINUR Negative 01/02/2018    BLOODU Negative 01/02/2018   ,   Urine Culture: No results found for: URINECX,   Wound Culure: No results found for: WOUNDCULT    Medications:  Current Facility-Administered Medications   Medication Dose Route Frequency    acetaminophen (TYLENOL) tablet 650 mg  650 mg Oral Q6H PRN    aspirin (ECOTRIN) EC tablet 325 mg  325 mg Oral Daily    atorvastatin (LIPITOR) tablet 40 mg  40 mg Oral Daily    cefTRIAXone (ROCEPHIN) IVPB (premix) 1,000 mg  1,000 mg Intravenous Q24H    enoxaparin (LOVENOX) subcutaneous injection 40 mg  40 mg Subcutaneous Daily    insulin glargine (LANTUS) subcutaneous injection 70 Units  70 Units Subcutaneous HS    insulin lispro (HumaLOG) 100 units/mL subcutaneous injection 1-5 Units  1-5 Units Subcutaneous TID AC    LORazepam (ATIVAN) tablet 0 5 mg  0 5 mg Oral BID PRN    metoclopramide (REGLAN) tablet 5 mg  5 mg Oral Daily    metroNIDAZOLE (FLAGYL) IVPB (premix) 500 mg  500 mg Intravenous Q8H    morphine injection 2 mg  2 mg Intravenous Q4H PRN    ondansetron (ZOFRAN) injection 4 mg  4 mg Intravenous Q6H PRN    silver sulfadiazine (SILVADENE,SSD) 1 % cream   Topical Daily    [START ON 3/19/2018] sodium chloride 0 9 % infusion  50 mL/hr Intravenous Continuous       Imaging:  No new imaging  Physical Exam:    General appearance: alert and oriented, in no acute distress and cooperative  Skin: left foot with large eschar/necrotic area to heel  Left distal tips of toes with necrosis, now with slight drainage  Podiatry at bedside painting toes with betadine  Neurologic: Grossly normal  Lungs: clear to auscultation bilaterally  Heart: regular rate and rhythm, S1, S2 normal, no murmur, click, rub or gallop  Abdomen: soft, non-tender; bowel sounds normal; no masses,  no organomegaly  Extremities: left foot with necrotic heel, left distal toes with necrosis vs dti however there is noted drainage between 4/5 toes  Skin warm and dry  Motor and sensory intact      Wound/Incision:  As above    Pulse exam:  Radial: Right: 2+ Left[de-identified] 2+  DP: Right: doppler signal Left: doppler signal  PT: Right: doppler signal Left: doppler signal      [facility] iLve  3/16/2018  [facility]  [phone]

## 2018-03-17 LAB
GLUCOSE SERPL-MCNC: 126 MG/DL (ref 65–140)
GLUCOSE SERPL-MCNC: 162 MG/DL (ref 65–140)
GLUCOSE SERPL-MCNC: 169 MG/DL (ref 65–140)
GLUCOSE SERPL-MCNC: 172 MG/DL (ref 65–140)

## 2018-03-17 PROCEDURE — 82948 REAGENT STRIP/BLOOD GLUCOSE: CPT

## 2018-03-17 PROCEDURE — 99232 SBSQ HOSP IP/OBS MODERATE 35: CPT | Performed by: INTERNAL MEDICINE

## 2018-03-17 PROCEDURE — 99232 SBSQ HOSP IP/OBS MODERATE 35: CPT | Performed by: PHYSICIAN ASSISTANT

## 2018-03-17 RX ADMIN — ASPIRIN 325 MG: 325 TABLET, COATED ORAL at 09:53

## 2018-03-17 RX ADMIN — ATORVASTATIN CALCIUM 40 MG: 40 TABLET, FILM COATED ORAL at 17:19

## 2018-03-17 RX ADMIN — METOCLOPRAMIDE HYDROCHLORIDE 5 MG: 10 TABLET ORAL at 09:54

## 2018-03-17 RX ADMIN — METRONIDAZOLE 500 MG: 500 INJECTION, SOLUTION INTRAVENOUS at 17:19

## 2018-03-17 RX ADMIN — METRONIDAZOLE 500 MG: 500 INJECTION, SOLUTION INTRAVENOUS at 02:00

## 2018-03-17 RX ADMIN — INSULIN GLARGINE 70 UNITS: 100 INJECTION, SOLUTION SUBCUTANEOUS at 22:00

## 2018-03-17 RX ADMIN — SILVER SULFADIAZINE: 10 CREAM TOPICAL at 10:16

## 2018-03-17 RX ADMIN — INSULIN LISPRO 1 UNITS: 100 INJECTION, SOLUTION INTRAVENOUS; SUBCUTANEOUS at 17:19

## 2018-03-17 RX ADMIN — CEFTRIAXONE 1000 MG: 1 INJECTION, SOLUTION INTRAVENOUS at 18:21

## 2018-03-17 RX ADMIN — ENOXAPARIN SODIUM 40 MG: 40 INJECTION SUBCUTANEOUS at 09:54

## 2018-03-17 RX ADMIN — METRONIDAZOLE 500 MG: 500 INJECTION, SOLUTION INTRAVENOUS at 09:54

## 2018-03-17 RX ADMIN — INSULIN LISPRO 1 UNITS: 100 INJECTION, SOLUTION INTRAVENOUS; SUBCUTANEOUS at 12:01

## 2018-03-17 NOTE — PROGRESS NOTES
Progress Note - Td Addison 1960, 61 y o  male MRN: [id number]    Unit/Bed#: E4 -01 Encounter: [id number]    Primary Care Provider: Ralph Cantrell DO   Date and time admitted to hospital: 3/14/2018  2:14 PM        PAD (peripheral artery disease) ([facility] )   Assessment & Plan    PAD w/LLE tissue loss (gangrene plantar aspect of left heel)  --ISAK 03/06/2018   -diffuse disease in the femoral popliteal arteries bilaterally with suggested tibioperoneal disease  -L YUDITH unreliable/103/51  --continue aspirin and statin  --continue [facility] per podiatry  --for abdominal aortogram with left lower extremity runoff and possible endovascular intervention by IR on 3/19/2018  --final recommendations follow results of angiogram  --antibiotics per primary service  --will d/w Dr Dimitri Rodriguez          Diabetic neuropathy, type II diabetes mellitus ([facility] )   Assessment & Plan    Poorly controlled diabetic, with recent HgbA1c of 9 4  --optimize BS control for optimization of wound healing and prevention disease  --continue current medical regimen per primary service        Hyperlipidemia   Assessment & Plan    --Continue statin therapy        Benign essential HTN   Assessment & Plan    --continue current medical regimen per primary service              Subjective:  Patient without complaints  Denies left lower extremity rest pain  Denies chest pain  Cardiology note appreciated  No temp spikes  BC negative x 24 hrs  VSS    Vitals:  /65 (BP Location: Right arm)   Pulse 73   Temp (!) 97 2 °F (36 2 °C) (Temporal)   Resp 20   Ht 6' 1" (1 854 m)   Wt 117 kg (258 lb 9 6 oz)   SpO2 99%   BMI 34 12 kg/m²     I/Os:  I/O last 3 completed shifts:   In: 0 [P O :480; I V :70; IV Piggyback:150]  Out: 1652 [Urine:1750]  I/O this shift:  In: -   Out: 350 [Urine:350]    Lab Results and Cultures:   Lab Results   Component Value Date    WBC 11 02 (H) 03/16/2018    HGB 9 9 (L) 03/16/2018    HCT 30 3 (L) 03/16/2018    MCV 85 03/16/2018    PLT 295 03/16/2018     Lab Results   Component Value Date    GLUCOSE 177 (H) 03/16/2018    CALCIUM 8 9 03/16/2018     (L) 03/16/2018    K 4 2 03/16/2018    CO2 25 03/16/2018    CL 99 (L) 03/16/2018    BUN 12 03/16/2018    CREATININE 1 02 03/16/2018     Lab Results   Component Value Date    INR 1 06 03/14/2018    PROTIME 13 8 03/14/2018        Blood Culture:   Lab Results   Component Value Date    BLOODCX No Growth at 24 hrs  03/15/2018    BLOODCX No Growth at 24 hrs  03/15/2018   ,   Urinalysis:   Lab Results   Component Value Date    COLORU Yellow 01/02/2018    CLARITYU Clear 01/02/2018    SPECGRAV 1 025 01/02/2018    PHUR 6 0 01/02/2018    LEUKOCYTESUR (A) 01/02/2018     Elevated glucose may cause decreased leukocyte values   See urine microscopic for [facility] result/    NITRITE Negative 01/02/2018    PROTEINUA Trace (A) 01/02/2018    GLUCOSEU >=1000 (1%) (A) 01/02/2018    KETONESU Negative 01/02/2018    BILIRUBINUR Negative 01/02/2018    BLOODU Negative 01/02/2018   ,   Urine Culture: No results found for: URINECX,   Wound Culure: No results found for: WOUNDCULT    Medications:  Current Facility-Administered Medications   Medication Dose Route Frequency    acetaminophen (TYLENOL) tablet 650 mg  650 mg Oral Q6H PRN    aspirin (ECOTRIN) EC tablet 325 mg  325 mg Oral Daily    atorvastatin (LIPITOR) tablet 40 mg  40 mg Oral Daily    cefTRIAXone (ROCEPHIN) IVPB (premix) 1,000 mg  1,000 mg Intravenous Q24H    enoxaparin (LOVENOX) subcutaneous injection 40 mg  40 mg Subcutaneous Daily    insulin glargine (LANTUS) subcutaneous injection 70 Units  70 Units Subcutaneous HS    insulin lispro (HumaLOG) 100 units/mL subcutaneous injection 1-5 Units  1-5 Units Subcutaneous TID AC    LORazepam (ATIVAN) tablet 0 5 mg  0 5 mg Oral BID PRN    metoclopramide (REGLAN) tablet 5 mg  5 mg Oral Daily    metroNIDAZOLE (FLAGYL) IVPB (premix) 500 mg  500 mg Intravenous Q8H    morphine injection 2 mg  2 mg Intravenous Q4H PRN    ondansetron (ZOFRAN) injection 4 mg  4 mg Intravenous Q6H PRN    silver sulfadiazine (SILVADENE,SSD) 1 % cream   Topical Daily    [START ON 3/19/2018] sodium chloride 0 9 % infusion  50 mL/hr Intravenous Continuous       Imaging:  No new vascular studies for review    Physical Exam:    General appearance: alert and oriented, in no acute distress  Neurologic: Grossly normal  Neck: no adenopathy, no carotid bruit, no JVD, supple, symmetrical, trachea midline and thyroid not enlarged, symmetric, no tenderness/mass/nodules  Lungs: clear to auscultation bilaterally  Heart: regular rate and rhythm, S1, S2 normal, no murmur, click, rub or gallop  Abdomen: soft, non-tender; bowel sounds normal; no masses,  no organomegaly and Nondistended  No abdominal bruits  Obese  Extremities: no edema, redness or tenderness in the calves or thighs and Dry gangrene on the planter aspect of left heel  No purulent drainage  Dry eschar noted on several toes of the left foot as previously noted   bilateral lower extremities motor and sensory intact    Wound/Incision:  As above    Pulse exam:  Radial: Right: 2+ Left[de-identified] 2+  Femoral: Right: 2+ Left: 2+  Popliteal: Right: non-palpable Left: non-palpable  DP: Right: doppler signal and non-palpable Left: doppler signal and non-palpable  PT: Right: doppler signal and non-palpable Left: doppler signal and non-palpable      Cyn Wall PA-C  3/17/2018  [facility], [phone]

## 2018-03-17 NOTE — ASSESSMENT & PLAN NOTE
PAD w/LLE tissue loss (gangrene plantar aspect of left heel)  --SIAK 03/06/2018   -diffuse disease in the femoral popliteal arteries bilaterally with suggested tibioperoneal disease     -L YUDITH unreliable/103/51  --continue aspirin and statin  --continue [address] per podiatry  --for abdominal aortogram with left lower extremity runoff and possible endovascular intervention by IR on 3/19/2018  --final recommendations follow results of angiogram  --antibiotics per primary service  --will d/w Dr Zohaib Montoya

## 2018-03-17 NOTE — PROGRESS NOTES
[facility] Internal Medicine Progress Note  Patient: Elmer Pa 61 y o  male   MRN: [id number]  PCP: Tyrone Chavez DO  Unit/Bed#: E4 -01 Encounter: [id number]  Date Of Visit: 03/17/18    Assessment:    Principal Problem:    Non-healing wound of left heel  Active Problems:    Anxiety and depression    Benign essential HTN    Diabetic autonomic neuropathy associated with type 2 diabetes mellitus ([facility] )    Diabetic neuropathy, type II diabetes mellitus ([place] [address] )    Diabetic peripheral neuropathy (HCC)    Hyperlipidemia    Peripheral vascular disease ([facility] 75 )    Hyperglycemia    T wave inversion in EKG    Atypical chest pain    Orthostatic hypotension      Plan:    1 )  Left foot infection/PVD  -patient has an ongoing left heel infection for the past 1 month which is progressively worsened the past 2 weeks with eschar and gangrenous formation  -patient had lower extremity Dopplers which were negative for DVT  -patient also had lower extremity arterial Dopplers on 03/06/18 which revealed diffuse atherosclerotic disease noted throughout the femoral popliteal arteries suggested of tibioperoneal disease  -patient has palpable pulses in bilateral lower extremities and skin is warm to touch  -there is concern of gangrenous formation of the heel and also the tips of the left foot  -x-ray is negative for any osteomyelitis, , CRP 79 5  -podiatry consult will be appreciated  -vascular consult appreciated  -continue with empiric antibiotics, cultures negative thus far  -IR consult for possible lower extremity vascular intervention     2 )  Chest pain/NSTEMI  -rule out ACS patient has significant risk factors including age, diabetes with multiple complications  -troponin 0 56, 0 32, 0 43  -telemetry monitoring   -aspirin, statin  -morphine as needed for pain  -will introduce beta blocker as blood pressure tolerates  -cardiology follow-up will be appreciated     3 )  Diabetes mellitus  -patient takes Lantus 70 units b i d  will continue this regimen, monitor Accu-Cheks and sliding scale for coverage  -HbA1c 9 4     4 )  Gastroparesis  -continue with Reglan, Zofran as needed     5 )  Diabetic autonomic neuropathy  -will monitor off midodrine     6 )  Rule out new onset heart failure  -given patient's lower extremity edema, orthopnea, PND and intermittent chest pain there is concern of underlying cardiomyopathy whether this is ischemic or nonischemic  -BNP is slightly elevated at 527, troponin 0 56  -echocardiogram revealed ejection fraction 55-60%, moderate hypokinesis of basal inferior walls,       VTE Pharmacologic Prophylaxis:   Pharmacologic: lovenox    Mechanical VTE Prophylaxis in Place: yes    Patient Centered Rounds: I have performed bedside rounds with nursing staff today  Time Spent for Care: 20 minutes  More than 50% of total time spent on counseling and coordination of care as described above  Current Length of Stay: 3 day(s)    Current Patient Status: Inpatient   Certification Statement: The patient will continue to require additional inpatient hospital stay due to LE ulcer    Discharge Plan / Estimated Discharge Date: 2-3 days    Code Status: Level 1 - Full Code      Subjective:   Patient seen and examined at bedside, denies any complaints    Objective:     Vitals:   Temp (24hrs), Av 5 °F (36 4 °C), Min:97 2 °F (36 2 °C), Max:97 8 °F (36 6 °C)    HR:  [73-84] 73  Resp:  [18-20] 20  BP: (113-140)/(65-77) 113/65  SpO2:  [97 %-99 %] 99 %  Body mass index is 34 12 kg/m²  Input and Output Summary (last 24 hours):        Intake/Output Summary (Last 24 hours) at 18 1304  Last data filed at 18 0748   Gross per 24 hour   Intake              700 ml   Output             1400 ml   Net             -700 ml       Physical Exam:    Constitutional: Patient is oriented to person, place and time, no acute distress  HEENT:  Normocephalic, atraumatic, EOMI, PERRLA, no scleral icterus, no pallor, moist oral mucosa  Neck: Supple, no masses, no thyromegaly, no bruits Normal range of motion  Lymph nodes:  No lymphadenopathy  Cardiovascular: Normal S1S2, RRR, No murmurs/rubs/gallops appreciated  Pulmonary: Clear to auscultation bilaterally, No rhonchi/rales/wheezing appreciated  Abdominal: Soft, Bowel sounds present, Non-tender, Non-distended, No rebound/guarding, no hepatomegaly   Musculoskeletal: No tenderness/abnormality   Extremities:  1+ bilateral lower extremity edema, Peripheral pulses palpable and equal bilaterally  Neurological: Cranial nerves II-XII grossly intact, sensation intact, otherwise no focal neurological symptoms  Skin:  Left foot with necrotic area to heel, necrotic area to 3rd and 4th toes on the left    Additional Data:     Labs:      Results from last 7 days  Lab Units 03/16/18  0518   WBC Thousand/uL 11 02*   HEMOGLOBIN g/dL 9 9*   HEMATOCRIT % 30 3*   PLATELETS Thousands/uL 295   NEUTROS PCT % 71   LYMPHS PCT % 19   MONOS PCT % 7   EOS PCT % 2       Results from last 7 days  Lab Units 03/16/18  0518 03/15/18  0546   SODIUM mmol/L 134* 135*   POTASSIUM mmol/L 4 2 4 0   CHLORIDE mmol/L 99* 101   CO2 mmol/L 25 25   BUN mg/dL 12 13   CREATININE mg/dL 1 02 1 08   CALCIUM mg/dL 8 9 8 8   TOTAL PROTEIN g/dL  --  7 6   BILIRUBIN TOTAL mg/dL  --  0 37   ALK PHOS U/L  --  50   ALT U/L  --  20   AST U/L  --  22   GLUCOSE RANDOM mg/dL 177* 114       Results from last 7 days  Lab Units 03/14/18  1450   INR  1 06        I Have Reviewed All Lab Data Listed Above  Imaging:    Imaging Reports Reviewed Today Include: no new images      Recent Cultures (last 7 days):       Results from last 7 days  Lab Units 03/15/18  1632   BLOOD CULTURE  No Growth at 24 hrs  No Growth at 24 hrs         Last 24 Hours Medication List:     Current Facility-Administered Medications:  acetaminophen 650 mg Oral Q6H PRN Andreina Rolon MD    aspirin 325 mg Oral Daily Andreina Rolon MD    atorvastatin 40 mg Oral Daily Andreina Rolon MD cefTRIAXone 1,000 mg Intravenous Q24H Rakan Ruiz MD Last Rate: Stopped (03/16/18 1949)   enoxaparin 40 mg Subcutaneous Daily Rakan Ruiz MD    insulin glargine 70 Units Subcutaneous HS Rakan Ruiz MD    insulin lispro 1-5 Units Subcutaneous TID AC Rakan Ruiz MD    LORazepam 0 5 mg Oral BID PRN Rakan Ruiz MD    metoclopramide 5 mg Oral Daily Rakan Ruiz MD    metroNIDAZOLE 500 mg Intravenous Q8H Rakan Ruiz MD Last Rate: 500 mg (03/17/18 0954)   morphine injection 2 mg Intravenous Q4H PRN Rakan Ruiz MD    ondansetron 4 mg Intravenous Q6H PRN Rakan Ruiz MD    silver sulfadiazine  Topical Daily MD Kacy Briggs ON 3/19/2018] sodium chloride 50 mL/hr Intravenous Continuous CARLTON Jarrell         Today, Patient Was Seen By: Rakan uRiz MD

## 2018-03-17 NOTE — PROGRESS NOTES
Progress Note - Podiatry  Magui Hoang 61 y o  male MRN: [id number]  Unit/Bed#: E4 -01 Encounter: [id number]    Assessment/Plan:  1  Left foot plantar heel unstageable eschar with associated cellulitis secondary to diabetes mellitus with neuropathy  2  Fourth digit DTI  3  DM type 2 with neuropathy-A1c: 9 4%  4  Peripheral arterial disease  -x-ray left foot negative for osteomyelitis  -heel dry malodorous necrotic eschar debrided using 15 blade and  down to subQ tissue, less than 20cm2 tissue removed revealing 5 5x4x0  4cm wound, no clinical signs of infection present  -patient to continue with ceftriaxone/Flagyl per Internal Medicine  -applied Betadine paint, DSD  -patient will have agram tomorrow 3/19 with possible intervention, IR consult was placed by vascular  -following restoration of blood flow will likely need OR debridement  -patient nonweightbearing to the left lower extremity, prevalons in bed; PT/OT consult placed  -medical management per primary team  -will change dressing and evaluate next 3/20    Subjective/Objective   Chief Complaint:   Chief Complaint   Patient presents with    Wound Check     Reportsto have gangerous wound on L heal area; sent into ED by PCP  Subjective: 61 y o  y/o male was seen and evaluated at bedside  Blood pressure 113/65, pulse 73, temperature (!) 97 2 °F (36 2 °C), temperature source Temporal, resp  rate 20, height 6' 1" (1 854 m), weight 117 kg (258 lb 9 6 oz), SpO2 99 %  ,Body mass index is 34 12 kg/m²  Invasive Devices     Peripheral Intravenous Line            Peripheral IV 03/14/18 Right Antecubital 2 days                Physical Exam:   General: Alert, cooperative and no distress  Lungs: Non labored breathing  Heart: Positive S1, S2  Abdomen: Soft, non-tender  Extremity:    Neurovascular status and gross motor function at baseline  Wound after removal of eschar measures 5 5x4x0  4 cm and is down to fibrotic subQtissue, no calvin pus, no cellulitis  There is a small eschar of the left 4th toe is stable without clinical signs infection  Lab, Imaging and other studies:   CBC: No results found for: WBC, HGB, HCT, MCV, PLT, ADJUSTEDWBC, MCH, MCHC, RDW, MPV, NRBC    Imaging: I have personally reviewed pertinent films in PACS  EKG, Pathology, and Other Studies: I have personally reviewed pertinent reports    VTE Pharmacologic Prophylaxis: Enoxaparin (Lovenox)

## 2018-03-17 NOTE — ASSESSMENT & PLAN NOTE
Poorly controlled diabetic, with recent HgbA1c of 9 4  --optimize BS control for optimization of wound healing and prevention disease  --continue current medical regimen per primary service

## 2018-03-18 LAB
ANION GAP SERPL CALCULATED.3IONS-SCNC: 9 MMOL/L (ref 4–13)
BASOPHILS # BLD AUTO: 0.02 THOUSANDS/ΜL (ref 0–0.1)
BASOPHILS NFR BLD AUTO: 0 % (ref 0–1)
BUN SERPL-MCNC: 13 MG/DL (ref 5–25)
CALCIUM SERPL-MCNC: 8.9 MG/DL (ref 8.3–10.1)
CHLORIDE SERPL-SCNC: 100 MMOL/L (ref 100–108)
CO2 SERPL-SCNC: 26 MMOL/L (ref 21–32)
CREAT SERPL-MCNC: 0.93 MG/DL (ref 0.6–1.3)
EOSINOPHIL # BLD AUTO: 0.22 THOUSAND/ΜL (ref 0–0.61)
EOSINOPHIL NFR BLD AUTO: 3 % (ref 0–6)
ERYTHROCYTE [DISTWIDTH] IN BLOOD BY AUTOMATED COUNT: 13 % (ref 11.6–15.1)
GFR SERPL CREATININE-BSD FRML MDRD: 90 ML/MIN/1.73SQ M
GLUCOSE SERPL-MCNC: 135 MG/DL (ref 65–140)
GLUCOSE SERPL-MCNC: 155 MG/DL (ref 65–140)
GLUCOSE SERPL-MCNC: 189 MG/DL (ref 65–140)
GLUCOSE SERPL-MCNC: 228 MG/DL (ref 65–140)
GLUCOSE SERPL-MCNC: 263 MG/DL (ref 65–140)
HCT VFR BLD AUTO: 35.1 % (ref 36.5–49.3)
HGB BLD-MCNC: 11.6 G/DL (ref 12–17)
LYMPHOCYTES # BLD AUTO: 1.49 THOUSANDS/ΜL (ref 0.6–4.47)
LYMPHOCYTES NFR BLD AUTO: 19 % (ref 14–44)
MCH RBC QN AUTO: 28 PG (ref 26.8–34.3)
MCHC RBC AUTO-ENTMCNC: 33 G/DL (ref 31.4–37.4)
MCV RBC AUTO: 85 FL (ref 82–98)
MONOCYTES # BLD AUTO: 0.52 THOUSAND/ΜL (ref 0.17–1.22)
MONOCYTES NFR BLD AUTO: 7 % (ref 4–12)
NEUTROPHILS # BLD AUTO: 5.76 THOUSANDS/ΜL (ref 1.85–7.62)
NEUTS SEG NFR BLD AUTO: 71 % (ref 43–75)
NRBC BLD AUTO-RTO: 0 /100 WBCS
PLATELET # BLD AUTO: 277 THOUSANDS/UL (ref 149–390)
PMV BLD AUTO: 10.2 FL (ref 8.9–12.7)
POTASSIUM SERPL-SCNC: 4.4 MMOL/L (ref 3.5–5.3)
RBC # BLD AUTO: 4.14 MILLION/UL (ref 3.88–5.62)
SODIUM SERPL-SCNC: 135 MMOL/L (ref 136–145)
WBC # BLD AUTO: 8.01 THOUSAND/UL (ref 4.31–10.16)

## 2018-03-18 PROCEDURE — 99232 SBSQ HOSP IP/OBS MODERATE 35: CPT | Performed by: INTERNAL MEDICINE

## 2018-03-18 PROCEDURE — 80048 BASIC METABOLIC PNL TOTAL CA: CPT | Performed by: INTERNAL MEDICINE

## 2018-03-18 PROCEDURE — 82948 REAGENT STRIP/BLOOD GLUCOSE: CPT

## 2018-03-18 PROCEDURE — 0JBR0ZZ EXCISION OF LEFT FOOT SUBCUTANEOUS TISSUE AND FASCIA, OPEN APPROACH: ICD-10-PCS | Performed by: PODIATRIST

## 2018-03-18 PROCEDURE — 85025 COMPLETE CBC W/AUTO DIFF WBC: CPT | Performed by: INTERNAL MEDICINE

## 2018-03-18 PROCEDURE — 99232 SBSQ HOSP IP/OBS MODERATE 35: CPT | Performed by: PHYSICIAN ASSISTANT

## 2018-03-18 RX ORDER — LACTULOSE 20 G/30ML
20 SOLUTION ORAL 2 TIMES DAILY
Status: DISCONTINUED | OUTPATIENT
Start: 2018-03-18 | End: 2018-03-23 | Stop reason: HOSPADM

## 2018-03-18 RX ADMIN — SODIUM CHLORIDE 50 ML/HR: 0.9 INJECTION, SOLUTION INTRAVENOUS at 23:58

## 2018-03-18 RX ADMIN — CEFTRIAXONE 1000 MG: 1 INJECTION, SOLUTION INTRAVENOUS at 18:16

## 2018-03-18 RX ADMIN — LACTULOSE 20 G: 20 SOLUTION ORAL at 12:13

## 2018-03-18 RX ADMIN — INSULIN LISPRO 2 UNITS: 100 INJECTION, SOLUTION INTRAVENOUS; SUBCUTANEOUS at 12:13

## 2018-03-18 RX ADMIN — SILVER SULFADIAZINE: 10 CREAM TOPICAL at 12:19

## 2018-03-18 RX ADMIN — METRONIDAZOLE 500 MG: 500 INJECTION, SOLUTION INTRAVENOUS at 17:12

## 2018-03-18 RX ADMIN — INSULIN LISPRO 1 UNITS: 100 INJECTION, SOLUTION INTRAVENOUS; SUBCUTANEOUS at 18:16

## 2018-03-18 RX ADMIN — ATORVASTATIN CALCIUM 40 MG: 40 TABLET, FILM COATED ORAL at 17:12

## 2018-03-18 RX ADMIN — METRONIDAZOLE 500 MG: 500 INJECTION, SOLUTION INTRAVENOUS at 01:53

## 2018-03-18 RX ADMIN — INSULIN GLARGINE 70 UNITS: 100 INJECTION, SOLUTION SUBCUTANEOUS at 22:15

## 2018-03-18 RX ADMIN — ASPIRIN 325 MG: 325 TABLET, COATED ORAL at 08:25

## 2018-03-18 RX ADMIN — METRONIDAZOLE 500 MG: 500 INJECTION, SOLUTION INTRAVENOUS at 09:36

## 2018-03-18 RX ADMIN — ENOXAPARIN SODIUM 40 MG: 40 INJECTION SUBCUTANEOUS at 08:25

## 2018-03-18 RX ADMIN — METOCLOPRAMIDE HYDROCHLORIDE 5 MG: 10 TABLET ORAL at 08:26

## 2018-03-18 NOTE — PROGRESS NOTES
Progress Note - Connor Vuong 61 y o  male MRN: [id number]    Unit/Bed#: E4 -01 Encounter: [id number]  Subjective:   No chest pain, shortness of breath, palpitations or lightheadedness  Modest edema persists    Objective:   Vitals: Blood pressure 158/87, pulse 78, temperature 97 9 °F (36 6 °C), temperature source Tympanic, resp  rate 20, height 6' 1" (1 854 m), weight 117 kg (258 lb 9 6 oz), SpO2 99 %  ,Body mass index is 34 12 kg/m²  CBC with diff:   Results from last 7 days  Lab Units 03/18/18  0523   WBC Thousand/uL 8 01   RBC Million/uL 4 14   HEMOGLOBIN g/dL 11 6*   HEMATOCRIT % 35 1*   MCV fL 85   MCH pg 28 0   MCHC g/dL 33 0   RDW % 13 0   MPV fL 10 2   PLATELETS Thousands/uL 277     CMP:   Results from last 7 days  Lab Units 03/18/18  0758  03/15/18  0546   SODIUM mmol/L 135*  < > 135*   POTASSIUM mmol/L 4 4  < > 4 0   CHLORIDE mmol/L 100  < > 101   CO2 mmol/L 26  < > 25   ANION GAP mmol/L 9  < > 9   BUN mg/dL 13  < > 13   CREATININE mg/dL 0 93  < > 1 08   GLUCOSE RANDOM mg/dL 155*  < > 114   CALCIUM mg/dL 8 9  < > 8 8   AST U/L  --   --  22   ALT U/L  --   --  20   ALK PHOS U/L  --   --  50   TOTAL PROTEIN g/dL  --   --  7 6   BILIRUBIN TOTAL mg/dL  --   --  0 37   EGFR ml/min/1 73sq m 90  < > 75   < > = values in this interval not displayed  Physical exam:  Lungs-clear without wheezing, rhonchi or rales  Heart-regular without murmur rub or gallop  Abdomen-obese  Nontender without mass organomegaly  Extremities-1+ pedal edema with absent distal pulses    Assessment:  1  Atypical chest pain  Noncardiac  Has not recurred  2   Mild elevation of troponin  Suspicion of CAD since her appears to be a subtle inferior basal wall motion abnormality also seen on a prior stress test without ischemia in 2016   3   Orthostatic hypotension  Blood pressure somewhat elevated  Midodrine has been stopped  4   Chronic peripheral edema  Mild  No specific treatment especially in light of orthostasis  5  PVD with compromised circulation  For angiogram tomorrow  6  Diabetes mellitus  7  Hyperlipidemia well controlled on atorvastatin    Plan:  Continue aspirin and statin  Continue off midodrine  Would hold on beta-blockers in light of orthostasis  Blood pressure appears to be fairly labile  Will decide on timing of stress test based on angiogram findings tomorrow      Lilliam Green MD

## 2018-03-18 NOTE — PROGRESS NOTES
[facility] Internal Medicine Progress Note  Patient: Romy Multani 61 y o  male   MRN: [id number]  PCP: oK Galdamez DO  Unit/Bed#: E4 -01 Encounter: [id number]  Date Of Visit: 03/18/18    Assessment:    Principal Problem:    Non-healing wound of left heel  Active Problems:    Anxiety and depression    Benign essential HTN    Diabetic autonomic neuropathy associated with type 2 diabetes mellitus ([facility] )    Diabetic neuropathy, type II diabetes mellitus ([place] [address] )    Diabetic peripheral neuropathy ([place] [address] )    Hyperlipidemia    PAD (peripheral artery disease) (HCC)    Hyperglycemia    T wave inversion in EKG    Atypical chest pain    Orthostatic hypotension      Plan:    1 )  Left foot infection/PVD  -patient has an ongoing left heel infection for the past 1 month which is progressively worsened the past 2 weeks with eschar and gangrenous formation  -patient had lower extremity Dopplers which were negative for DVT  -patient also had lower extremity arterial Dopplers on 03/06/18 which revealed diffuse atherosclerotic disease noted throughout the femoral popliteal arteries suggested of tibioperoneal disease  -patient has palpable pulses in bilateral lower extremities and skin is warm to touch  -there is concern of gangrenous formation of the heel and also the tips of the left foot  -x-ray is negative for any osteomyelitis, , CRP 79 5  -podiatry consult will be appreciated  -vascular consult appreciated  -continue with empiric antibiotics, day 5  -IR consult for possible lower extremity vascular intervention     2 )  Chest pain/NSTEMI  -rule out ACS patient has significant risk factors including age, diabetes with multiple complications  -troponin 0 56, 0 32, 0 43  -telemetry monitoring   -aspirin, statin  -morphine as needed for pain  -will introduce beta blocker as blood pressure tolerates  -cardiology follow-up appreciated, further workup for high suspicion of CAD after angiogram     3 )  Diabetes mellitus  -patient takes Lantus 70 units b i d  will continue this regimen, monitor Accu-Cheks and sliding scale for coverage  -HbA1c 9 4     4 )  Gastroparesis  -continue with Reglan, Zofran as needed     5 )  Diabetic autonomic neuropathy  -will monitor off midodrine     6 )  Lower extremity edema  -echocardiogram revealed ejection fraction 55-60%, moderate hypokinesis of basal inferior walls,      VTE Pharmacologic Prophylaxis:   Pharmacologic: lovenox    Patient Centered Rounds: I have performed bedside rounds with nursing staff today  Time Spent for Care: 20 minutes  More than 50% of total time spent on counseling and coordination of care as described above  Current Length of Stay: 4 day(s)    Current Patient Status: Inpatient   Certification Statement: The patient will continue to require additional inpatient hospital stay due to further diagnostic workup    Discharge Plan / Estimated Discharge Date: TBD    Code Status: Level 1 - Full Code      Subjective:   Patient seen and examined at bedside, currently denies any complaints    Objective:     Vitals:   Temp (24hrs), Av 9 °F (36 6 °C), Min:97 7 °F (36 5 °C), Max:98 1 °F (36 7 °C)    HR:  [69-78] 78  Resp:  [20] 20  BP: ()/(58-87) 158/87  SpO2:  [97 %-99 %] 99 %  Body mass index is 34 12 kg/m²  Input and Output Summary (last 24 hours): Intake/Output Summary (Last 24 hours) at 18 1249  Last data filed at 18 1202   Gross per 24 hour   Intake             1030 ml   Output             2150 ml   Net            -1120 ml       Physical Exam:    Constitutional: Patient is oriented to person, place and time, no acute distress  HEENT:  Normocephalic, atraumatic, EOMI, PERRLA, no scleral icterus, no pallor, moist oral mucosa  Neck:  Supple, no masses, no thyromegaly, no bruits Normal range of motion  Lymph nodes:  No lymphadenopathy  Cardiovascular: Normal S1S2, RRR, No murmurs/rubs/gallops appreciated    Pulmonary: Clear to auscultation bilaterally, No rhonchi/rales/wheezing appreciated  Abdominal: Soft, Bowel sounds present, Non-tender, Non-distended, No rebound/guarding, no hepatomegaly   Musculoskeletal: No tenderness/abnormality   Extremities:  No cyanosis, clubbing or edema  Peripheral pulses palpable and equal bilaterally  Neurological: Cranial nerves II-XII grossly intact, sensation intact, otherwise no focal neurological symptoms  Skin: Skin is warm and dry, no rashes  Additional Data:     Labs:      Results from last 7 days  Lab Units 03/18/18  0523   WBC Thousand/uL 8 01   HEMOGLOBIN g/dL 11 6*   HEMATOCRIT % 35 1*   PLATELETS Thousands/uL 277   NEUTROS PCT % 71   LYMPHS PCT % 19   MONOS PCT % 7   EOS PCT % 3       Results from last 7 days  Lab Units 03/18/18  0758  03/15/18  0546   SODIUM mmol/L 135*  < > 135*   POTASSIUM mmol/L 4 4  < > 4 0   CHLORIDE mmol/L 100  < > 101   CO2 mmol/L 26  < > 25   BUN mg/dL 13  < > 13   CREATININE mg/dL 0 93  < > 1 08   CALCIUM mg/dL 8 9  < > 8 8   TOTAL PROTEIN g/dL  --   --  7 6   BILIRUBIN TOTAL mg/dL  --   --  0 37   ALK PHOS U/L  --   --  50   ALT U/L  --   --  20   AST U/L  --   --  22   GLUCOSE RANDOM mg/dL 155*  < > 114   < > = values in this interval not displayed  Results from last 7 days  Lab Units 03/14/18  1450   INR  1 06        I Have Reviewed All Lab Data Listed Above  Imaging:    Imaging Reports Reviewed Today Include: no new images      Recent Cultures (last 7 days):       Results from last 7 days  Lab Units 03/15/18  1632   BLOOD CULTURE  No Growth at 48 hrs  No Growth at 48 hrs         Last 24 Hours Medication List:     Current Facility-Administered Medications:  acetaminophen 650 mg Oral Q6H PRN Chapin Talbot MD    aspirin 325 mg Oral Daily Chapin Talbot MD    atorvastatin 40 mg Oral Daily Chapin Talbot MD    cefTRIAXone 1,000 mg Intravenous Q24H Chapin Talbot MD Last Rate: Stopped (03/17/18 1851)   enoxaparin 40 mg Subcutaneous Daily Chapin Talbot MD    insulin glargine 70 Units Subcutaneous HS Bari Mayer MD    insulin lispro 1-5 Units Subcutaneous TID AC Bari Mayer MD    lactulose 20 g Oral BID Bari Mayer MD    lactulose 200 g Rectal BID PRN Bari Mayer MD    LORazepam 0 5 mg Oral BID PRN Bari Mayer MD    metoclopramide 5 mg Oral Daily Bari Mayer MD    metroNIDAZOLE 500 mg Intravenous Q8H Bari Mayer MD Last Rate: 500 mg (03/18/18 0936)   morphine injection 2 mg Intravenous Q4H PRN Bari Mayer MD    ondansetron 4 mg Intravenous Q6H PRN Bari Mayer MD    silver sulfadiazine  Topical Daily Bari Mayer MD    Nicolle Bal ON 3/19/2018] sodium chloride 50 mL/hr Intravenous Continuous CARLTON Myers         Today, Patient Was Seen By: Bari Mayer MD

## 2018-03-18 NOTE — ASSESSMENT & PLAN NOTE
L plantar heel unstageable eschar  --L foot x-ray negative for OM  --continue [address] per Podiatry, likely will need debridement after revascularization  --continue Rocephin and Flagyl per primary service

## 2018-03-18 NOTE — PROGRESS NOTES
Progress Note - Lisa Israel 1960, 61 y o  male MRN: [id number]    Unit/Bed#: E4 -01 Encounter: [id number]    Primary Care Provider: Luis Gallegos DO   Date and time admitted to hospital: 3/14/2018  2:14 PM        PAD (peripheral artery disease) ([facility] )   Assessment & Plan    PAD w/LLE tissue loss (gangrene plantar aspect of left heel)  --ISAK 03/06/2018   -diffuse femoropopliteal disease w/ tibioperoneal disease bilaterally   -L YUDITH unreliable/103/51  --continue aspirin and statin  --continue [facility] per podiatry  --for abdominal aortogram with left lower extremity runoff and possible endovascular intervention by IR on 3/19/2018  --final recommendations to follow results of angiogram  --antibiotics per primary service  --will d/w Dr Krissy Cole          * Non-healing wound of left heel   Assessment & Plan    L plantar heel unstageable eschar  --L foot x-ray negative for OM  --continue [facility] per Podiatry, likely will need debridement after revascularization  --continue Rocephin and Flagyl per primary service          Diabetic neuropathy, type II diabetes mellitus ([facility] )   Assessment & Plan    Poorly controlled diabetic, with recent HgbA1c of 9 4  --optimize BS control for optimization of wound healing and prevention disease  --continue current medical regimen per primary service        Hyperlipidemia   Assessment & Plan    --Continue statin therapy        Benign essential HTN   Assessment & Plan    --continue current medical regimen per primary service              Subjective:  Patient without new complaints  Denies lower extremity rest pain  Denies left foot pain  No temp spikes  Leukocytosis resolved  BC negative x 48 hrs  VSS    Vitals:  /81 (BP Location: Right arm)   Pulse 70   Temp 97 9 °F (36 6 °C) (Tympanic)   Resp 20   Ht 6' 1" (1 854 m)   Wt 117 kg (258 lb 9 6 oz)   SpO2 98%   BMI 34 12 kg/m²     I/Os:  I/O last 3 completed shifts: In: 2560 [P O :1280;  I V :150; IV Piggyback:300]  Out: 2900 [Urine:2900]  No intake/output data recorded  Lab Results and Cultures:   Lab Results   Component Value Date    WBC 8 01 03/18/2018    HGB 11 6 (L) 03/18/2018    HCT 35 1 (L) 03/18/2018    MCV 85 03/18/2018     03/18/2018     Lab Results   Component Value Date    GLUCOSE 155 (H) 03/18/2018    CALCIUM 8 9 03/18/2018     (L) 03/18/2018    K 4 4 03/18/2018    CO2 26 03/18/2018     03/18/2018    BUN 13 03/18/2018    CREATININE 0 93 03/18/2018     Lab Results   Component Value Date    INR 1 06 03/14/2018    PROTIME 13 8 03/14/2018        Blood Culture:   Lab Results   Component Value Date    BLOODCX No Growth at 48 hrs  03/15/2018    BLOODCX No Growth at 48 hrs  03/15/2018   ,   Urinalysis:   Lab Results   Component Value Date    COLORU Yellow 01/02/2018    CLARITYU Clear 01/02/2018    SPECGRAV 1 025 01/02/2018    PHUR 6 0 01/02/2018    LEUKOCYTESUR (A) 01/02/2018     Elevated glucose may cause decreased leukocyte values   See urine microscopic for [facility] result/    NITRITE Negative 01/02/2018    PROTEINUA Trace (A) 01/02/2018    GLUCOSEU >=1000 (1%) (A) 01/02/2018    KETONESU Negative 01/02/2018    BILIRUBINUR Negative 01/02/2018    BLOODU Negative 01/02/2018   ,   Urine Culture: No results found for: URINECX,   Wound Culure: No results found for: WOUNDCULT    Medications:  Current Facility-Administered Medications   Medication Dose Route Frequency    acetaminophen (TYLENOL) tablet 650 mg  650 mg Oral Q6H PRN    aspirin (ECOTRIN) EC tablet 325 mg  325 mg Oral Daily    atorvastatin (LIPITOR) tablet 40 mg  40 mg Oral Daily    cefTRIAXone (ROCEPHIN) IVPB (premix) 1,000 mg  1,000 mg Intravenous Q24H    enoxaparin (LOVENOX) subcutaneous injection 40 mg  40 mg Subcutaneous Daily    insulin glargine (LANTUS) subcutaneous injection 70 Units  70 Units Subcutaneous HS    insulin lispro (HumaLOG) 100 units/mL subcutaneous injection 1-5 Units  1-5 Units Subcutaneous TID AC    lactulose retention enema 200 g  200 g Rectal BID PRN    LORazepam (ATIVAN) tablet 0 5 mg  0 5 mg Oral BID PRN    metoclopramide (REGLAN) tablet 5 mg  5 mg Oral Daily    metroNIDAZOLE (FLAGYL) IVPB (premix) 500 mg  500 mg Intravenous Q8H    morphine injection 2 mg  2 mg Intravenous Q4H PRN    ondansetron (ZOFRAN) injection 4 mg  4 mg Intravenous Q6H PRN    silver sulfadiazine (SILVADENE,SSD) 1 % cream   Topical Daily    [START ON 3/19/2018] sodium chloride 0 9 % infusion  50 mL/hr Intravenous Continuous       Imaging:  No new imaging studies for review    Physical Exam:    General appearance: alert and oriented, in no acute distress  Neurologic: Grossly normal  Neck: no adenopathy, no carotid bruit, no JVD, supple, symmetrical, trachea midline and thyroid not enlarged, symmetric, no tenderness/mass/nodules  Lungs: clear to auscultation bilaterally  Heart: regular rate and rhythm, S1, S2 normal, no murmur, click, rub or gallop  Abdomen: soft, non-tender; bowel sounds normal; no masses,  no organomegaly and Obese but nondistended  No abdominal bruits  Extremities: no edema, redness or tenderness in the calves or thighs and Left foot dressing intact  No purulent drainage  Dry eschar noted on several toes left foot as previously noted  Bilateral lower extremities motor and sensory intact  No crepitus    Wound/Incision:  Left foot dressing intact    As above    Pulse exam:  Radial: Right: 2+ Left[de-identified] 2+  Femoral: Right: 2+ Left: 2+  Popliteal: Right: non-palpable Left: non-palpable  DP: Right: non-palpable Left: non-palpable  PT: Right: non-palpable Left: non-palpable      Kashif Juarez PA-C  3/18/2018  [facility], [phone]

## 2018-03-18 NOTE — ASSESSMENT & PLAN NOTE
PAD w/LLE tissue loss (gangrene plantar aspect of left heel)  --ISAK 03/06/2018   -diffuse femoropopliteal disease w/ tibioperoneal disease bilaterally   -L YUDITH unreliable/103/51  --continue aspirin and statin  --continue [facility] per podiatry  --for abdominal aortogram with left lower extremity runoff and possible endovascular intervention by IR on 3/19/2018  --final recommendations to follow results of angiogram  --antibiotics per primary service  --will d/w Dr Hunter Diggs

## 2018-03-19 ENCOUNTER — APPOINTMENT (INPATIENT)
Dept: RADIOLOGY | Facility: HOSPITAL | Age: 58
DRG: 264 | End: 2018-03-19
Payer: COMMERCIAL

## 2018-03-19 PROBLEM — K31.84 DIABETIC GASTROPARESIS (HCC): Chronic | Status: ACTIVE | Noted: 2018-03-19

## 2018-03-19 PROBLEM — E66.01 MORBID OBESITY (HCC): Chronic | Status: ACTIVE | Noted: 2018-03-19

## 2018-03-19 PROBLEM — E11.43 DIABETIC GASTROPARESIS (HCC): Chronic | Status: ACTIVE | Noted: 2018-03-19

## 2018-03-19 PROBLEM — R60.0 BILATERAL LOWER EXTREMITY EDEMA: Chronic | Status: ACTIVE | Noted: 2018-03-19

## 2018-03-19 LAB
ANION GAP SERPL CALCULATED.3IONS-SCNC: 10 MMOL/L (ref 4–13)
BASOPHILS # BLD AUTO: 0.04 THOUSANDS/ΜL (ref 0–0.1)
BASOPHILS NFR BLD AUTO: 1 % (ref 0–1)
BUN SERPL-MCNC: 13 MG/DL (ref 5–25)
CALCIUM SERPL-MCNC: 9.1 MG/DL (ref 8.3–10.1)
CHLORIDE SERPL-SCNC: 100 MMOL/L (ref 100–108)
CO2 SERPL-SCNC: 24 MMOL/L (ref 21–32)
CREAT SERPL-MCNC: 0.95 MG/DL (ref 0.6–1.3)
EOSINOPHIL # BLD AUTO: 0.18 THOUSAND/ΜL (ref 0–0.61)
EOSINOPHIL NFR BLD AUTO: 2 % (ref 0–6)
ERYTHROCYTE [DISTWIDTH] IN BLOOD BY AUTOMATED COUNT: 13 % (ref 11.6–15.1)
GFR SERPL CREATININE-BSD FRML MDRD: 88 ML/MIN/1.73SQ M
GLUCOSE SERPL-MCNC: 111 MG/DL (ref 65–140)
GLUCOSE SERPL-MCNC: 143 MG/DL (ref 65–140)
GLUCOSE SERPL-MCNC: 175 MG/DL (ref 65–140)
GLUCOSE SERPL-MCNC: 189 MG/DL (ref 65–140)
GLUCOSE SERPL-MCNC: 189 MG/DL (ref 65–140)
GLUCOSE SERPL-MCNC: 210 MG/DL (ref 65–140)
HCT VFR BLD AUTO: 35.4 % (ref 36.5–49.3)
HGB BLD-MCNC: 11.7 G/DL (ref 12–17)
LYMPHOCYTES # BLD AUTO: 1.46 THOUSANDS/ΜL (ref 0.6–4.47)
LYMPHOCYTES NFR BLD AUTO: 18 % (ref 14–44)
MCH RBC QN AUTO: 28 PG (ref 26.8–34.3)
MCHC RBC AUTO-ENTMCNC: 33.1 G/DL (ref 31.4–37.4)
MCV RBC AUTO: 85 FL (ref 82–98)
MONOCYTES # BLD AUTO: 0.6 THOUSAND/ΜL (ref 0.17–1.22)
MONOCYTES NFR BLD AUTO: 7 % (ref 4–12)
NEUTROPHILS # BLD AUTO: 5.99 THOUSANDS/ΜL (ref 1.85–7.62)
NEUTS SEG NFR BLD AUTO: 72 % (ref 43–75)
NRBC BLD AUTO-RTO: 0 /100 WBCS
PLATELET # BLD AUTO: 271 THOUSANDS/UL (ref 149–390)
PMV BLD AUTO: 9.8 FL (ref 8.9–12.7)
POTASSIUM SERPL-SCNC: 4.5 MMOL/L (ref 3.5–5.3)
RBC # BLD AUTO: 4.18 MILLION/UL (ref 3.88–5.62)
SODIUM SERPL-SCNC: 134 MMOL/L (ref 136–145)
WBC # BLD AUTO: 8.27 THOUSAND/UL (ref 4.31–10.16)

## 2018-03-19 PROCEDURE — C1894 INTRO/SHEATH, NON-LASER: HCPCS

## 2018-03-19 PROCEDURE — 36247 INS CATH ABD/L-EXT ART 3RD: CPT

## 2018-03-19 PROCEDURE — 99232 SBSQ HOSP IP/OBS MODERATE 35: CPT | Performed by: INTERNAL MEDICINE

## 2018-03-19 PROCEDURE — C1769 GUIDE WIRE: HCPCS

## 2018-03-19 PROCEDURE — B41GYZZ FLUOROSCOPY OF LEFT LOWER EXTREMITY ARTERIES USING OTHER CONTRAST: ICD-10-PCS | Performed by: RADIOLOGY

## 2018-03-19 PROCEDURE — 80048 BASIC METABOLIC PNL TOTAL CA: CPT | Performed by: INTERNAL MEDICINE

## 2018-03-19 PROCEDURE — 99153 MOD SED SAME PHYS/QHP EA: CPT

## 2018-03-19 PROCEDURE — 97530 THERAPEUTIC ACTIVITIES: CPT

## 2018-03-19 PROCEDURE — 76937 US GUIDE VASCULAR ACCESS: CPT

## 2018-03-19 PROCEDURE — 75625 CONTRAST EXAM ABDOMINL AORTA: CPT

## 2018-03-19 PROCEDURE — 75625 CONTRAST EXAM ABDOMINL AORTA: CPT | Performed by: RADIOLOGY

## 2018-03-19 PROCEDURE — 99152 MOD SED SAME PHYS/QHP 5/>YRS: CPT

## 2018-03-19 PROCEDURE — 75710 ARTERY X-RAYS ARM/LEG: CPT

## 2018-03-19 PROCEDURE — 97110 THERAPEUTIC EXERCISES: CPT

## 2018-03-19 PROCEDURE — 99152 MOD SED SAME PHYS/QHP 5/>YRS: CPT | Performed by: RADIOLOGY

## 2018-03-19 PROCEDURE — 82948 REAGENT STRIP/BLOOD GLUCOSE: CPT

## 2018-03-19 PROCEDURE — B410YZZ FLUOROSCOPY OF ABDOMINAL AORTA USING OTHER CONTRAST: ICD-10-PCS | Performed by: RADIOLOGY

## 2018-03-19 PROCEDURE — 36246 INS CATH ABD/L-EXT ART 2ND: CPT | Performed by: RADIOLOGY

## 2018-03-19 PROCEDURE — 97535 SELF CARE MNGMENT TRAINING: CPT

## 2018-03-19 PROCEDURE — 75710 ARTERY X-RAYS ARM/LEG: CPT | Performed by: RADIOLOGY

## 2018-03-19 PROCEDURE — 85025 COMPLETE CBC W/AUTO DIFF WBC: CPT | Performed by: INTERNAL MEDICINE

## 2018-03-19 RX ORDER — MIDAZOLAM HYDROCHLORIDE 1 MG/ML
INJECTION INTRAMUSCULAR; INTRAVENOUS CODE/TRAUMA/SEDATION MEDICATION
Status: COMPLETED | OUTPATIENT
Start: 2018-03-19 | End: 2018-03-19

## 2018-03-19 RX ORDER — CALCIUM CARBONATE 200(500)MG
500 TABLET,CHEWABLE ORAL DAILY PRN
Status: DISCONTINUED | OUTPATIENT
Start: 2018-03-19 | End: 2018-03-23 | Stop reason: HOSPADM

## 2018-03-19 RX ORDER — DOCUSATE SODIUM 100 MG/1
100 CAPSULE, LIQUID FILLED ORAL 2 TIMES DAILY
Status: DISCONTINUED | OUTPATIENT
Start: 2018-03-19 | End: 2018-03-23 | Stop reason: HOSPADM

## 2018-03-19 RX ORDER — FENTANYL CITRATE 50 UG/ML
INJECTION, SOLUTION INTRAMUSCULAR; INTRAVENOUS CODE/TRAUMA/SEDATION MEDICATION
Status: COMPLETED | OUTPATIENT
Start: 2018-03-19 | End: 2018-03-19

## 2018-03-19 RX ADMIN — METRONIDAZOLE 500 MG: 500 INJECTION, SOLUTION INTRAVENOUS at 01:00

## 2018-03-19 RX ADMIN — METOCLOPRAMIDE HYDROCHLORIDE 5 MG: 10 TABLET ORAL at 09:48

## 2018-03-19 RX ADMIN — ENOXAPARIN SODIUM 40 MG: 40 INJECTION SUBCUTANEOUS at 09:51

## 2018-03-19 RX ADMIN — INSULIN GLARGINE 35 UNITS: 100 INJECTION, SOLUTION SUBCUTANEOUS at 22:19

## 2018-03-19 RX ADMIN — FENTANYL CITRATE 50 MCG: 50 INJECTION, SOLUTION INTRAMUSCULAR; INTRAVENOUS at 17:25

## 2018-03-19 RX ADMIN — FENTANYL CITRATE 50 MCG: 50 INJECTION, SOLUTION INTRAMUSCULAR; INTRAVENOUS at 17:06

## 2018-03-19 RX ADMIN — MIDAZOLAM 2 MG: 1 INJECTION INTRAMUSCULAR; INTRAVENOUS at 16:10

## 2018-03-19 RX ADMIN — IODIXANOL 100 ML: 320 INJECTION, SOLUTION INTRAVASCULAR at 18:32

## 2018-03-19 RX ADMIN — CEFTRIAXONE 1000 MG: 1 INJECTION, SOLUTION INTRAVENOUS at 18:12

## 2018-03-19 RX ADMIN — ASPIRIN 325 MG: 325 TABLET, COATED ORAL at 09:48

## 2018-03-19 RX ADMIN — ATORVASTATIN CALCIUM 40 MG: 40 TABLET, FILM COATED ORAL at 19:06

## 2018-03-19 RX ADMIN — FENTANYL CITRATE 50 MCG: 50 INJECTION, SOLUTION INTRAMUSCULAR; INTRAVENOUS at 16:49

## 2018-03-19 RX ADMIN — Medication 500 MG: at 01:06

## 2018-03-19 RX ADMIN — METRONIDAZOLE 500 MG: 500 INJECTION, SOLUTION INTRAVENOUS at 09:48

## 2018-03-19 RX ADMIN — MIDAZOLAM 1 MG: 1 INJECTION INTRAMUSCULAR; INTRAVENOUS at 16:49

## 2018-03-19 RX ADMIN — FENTANYL CITRATE 50 MCG: 50 INJECTION, SOLUTION INTRAMUSCULAR; INTRAVENOUS at 16:10

## 2018-03-19 RX ADMIN — METRONIDAZOLE 500 MG: 500 INJECTION, SOLUTION INTRAVENOUS at 19:00

## 2018-03-19 RX ADMIN — MIDAZOLAM 1 MG: 1 INJECTION INTRAMUSCULAR; INTRAVENOUS at 17:16

## 2018-03-19 NOTE — PLAN OF CARE
Problem: OCCUPATIONAL THERAPY ADULT  Goal: Performs self-care activities at highest level of function for planned discharge setting  See evaluation for individualized goals  Treatment Interventions: ADL retraining, Functional transfer training, Endurance training, UE strengthening/ROM, Cognitive reorientation, Patient/family training, Equipment evaluation/education, Compensatory technique education, Energy conservation, Activityengagement  Equipment Recommended: Bedside commode       See flowsheet documentation for full assessment, interventions and recommendations  Outcome: Progressing  Limitation: Decreased ADL status, Decreased UE strength, Decreased Safe judgement during ADL, Decreased endurance, Decreased cognition, Decreased self-care trans, Decreased high-level ADLs, Decreased sensation  Prognosis: Good  Assessment: Pt was seen for skilled OT with focus on review of energy conservation techs, fall prevention/home safety, review of current plan of care and extended review of leisure interests and personal goals  Pt required extended time to self express regarding current health status and noted deficits  Pt reports, I cant see going back to that house, It's not set up for me"  Encouraged Pt to identify problem areas but challenged Pt to write down possible solutions to problems to gain understanding of strategies to facilitate his personal goals  Pt had many reasons why he is not able to do most things needed during he daily routine  Encouraged Pt to list all things that he currently is able to do and to identify ways to cope with deficits  Educated Pt on use of hand held bidet to cleanse following BM due to limited functional reach  See above levels of A required for all functional tasks  Pt is now NWB in LLE  Pt kept in bedside chair during tx session with completion of UE self care and review of fall prevention/home safety   Pt may benefit from further rehab with focus on achieving optimal performance levels with all functional tasks  Written information provided for energy conservation techs and fall prevention        OT Discharge Recommendation: Home OT         Comments: Tyra Cortez, [address]

## 2018-03-19 NOTE — PROGRESS NOTES
Progress Note - Elias Sandoval 1960, 61 y o  male MRN: [id number]    Unit/Bed#: E4 -01 Encounter: [id number]    Primary Care Provider: Ayleen Bright DO   Date and time admitted to hospital: 3/14/2018  2:14 PM        PAD (peripheral artery disease) ([facility] )   Assessment & Plan    PAD w/LLE tissue loss (gangrene plantar aspect of left heel)  --ISAK 03/06/2018   -diffuse femoropopliteal disease w/ tibioperoneal disease bilaterally   -L YUDITH unreliable/103/51  --continue aspirin and statin  --continue [address] per podiatry  --for abdominal aortogram with left lower extremity runoff and possible endovascular intervention by IR on 3/19/2018  --final recommendations to follow results of angiogram  --antibiotics per primary service  --will d/w Dr Celso Hatch          * Non-healing wound of left heel   Assessment & Plan    L plantar heel unstageable eschar  --L foot x-ray negative for OM  --continue [address] per Podiatry, likely will need debridement after revascularization  --Plan for Agram today with possible intervention by IR  --continue Rocephin and Flagyl per primary service          Hyperlipidemia   Assessment & Plan    --Continue statin therapy        Diabetic neuropathy, type II diabetes mellitus ([facility] )   Assessment & Plan    Poorly controlled diabetic, with recent HgbA1c of 9 4  --optimize BS control for optimization of wound healing and prevention disease  --continue current medical regimen per primary service        Benign essential HTN   Assessment & Plan    --continue current medical regimen per primary service              Subjective:  Patient assessed out of bed in chair, he is worried about post intervention and being able sit up  Also disappointed that he was not on the schedule until this afternoon  Questions about arteriogram reviewed and answered  Vital signs are stable, with no new tissue loss or ulcerations to lower extremities    He continues to have left heel eschar with necrotic distal tips of his toes to the left foot  No new complaints this morning, denies pain  Vitals:  /79 (BP Location: Right arm)   Pulse 74   Temp 97 6 °F (36 4 °C) (Tympanic)   Resp 18   Ht 6' 1" (1 854 m)   Wt 117 kg (258 lb 9 6 oz)   SpO2 94%   BMI 34 12 kg/m²     I/Os:  I/O last 3 completed shifts: In: 1918 3 [P O :1280; I V :388 3; IV Piggyback:250]  Out: 5756 [Urine:3925]  No intake/output data recorded  Lab Results and Cultures:   Lab Results   Component Value Date    WBC 8 27 03/19/2018    HGB 11 7 (L) 03/19/2018    HCT 35 4 (L) 03/19/2018    MCV 85 03/19/2018     03/19/2018     Lab Results   Component Value Date    GLUCOSE 189 (H) 03/19/2018    CALCIUM 9 1 03/19/2018     (L) 03/19/2018    K 4 5 03/19/2018    CO2 24 03/19/2018     03/19/2018    BUN 13 03/19/2018    CREATININE 0 95 03/19/2018     Lab Results   Component Value Date    INR 1 06 03/14/2018    PROTIME 13 8 03/14/2018        Blood Culture:   Lab Results   Component Value Date    BLOODCX No Growth at 72 hrs  03/15/2018    BLOODCX No Growth at 72 hrs  03/15/2018   ,   Urinalysis:   Lab Results   Component Value Date    COLORU Yellow 01/02/2018    CLARITYU Clear 01/02/2018    SPECGRAV 1 025 01/02/2018    PHUR 6 0 01/02/2018    LEUKOCYTESUR (A) 01/02/2018     Elevated glucose may cause decreased leukocyte values   See urine microscopic for [facility] result/    NITRITE Negative 01/02/2018    PROTEINUA Trace (A) 01/02/2018    GLUCOSEU >=1000 (1%) (A) 01/02/2018    KETONESU Negative 01/02/2018    BILIRUBINUR Negative 01/02/2018    BLOODU Negative 01/02/2018   ,   Urine Culture: No results found for: URINECX,   Wound Culure: No results found for: WOUNDCULT    Medications:  Current Facility-Administered Medications   Medication Dose Route Frequency    acetaminophen (TYLENOL) tablet 650 mg  650 mg Oral Q6H PRN    aspirin (ECOTRIN) EC tablet 325 mg  325 mg Oral Daily    atorvastatin (LIPITOR) tablet 40 mg  40 mg Oral Daily    calcium carbonate (TUMS) chewable tablet 500 mg  500 mg Oral Daily PRN    cefTRIAXone (ROCEPHIN) IVPB (premix) 1,000 mg  1,000 mg Intravenous Q24H    enoxaparin (LOVENOX) subcutaneous injection 40 mg  40 mg Subcutaneous Daily    insulin glargine (LANTUS) subcutaneous injection 70 Units  70 Units Subcutaneous HS    insulin lispro (HumaLOG) 100 units/mL subcutaneous injection 1-5 Units  1-5 Units Subcutaneous TID AC    lactulose 20 g/30 mL oral solution 20 g  20 g Oral BID    lactulose retention enema 200 g  200 g Rectal BID PRN    LORazepam (ATIVAN) tablet 0 5 mg  0 5 mg Oral BID PRN    metoclopramide (REGLAN) tablet 5 mg  5 mg Oral Daily    metroNIDAZOLE (FLAGYL) IVPB (premix) 500 mg  500 mg Intravenous Q8H    morphine injection 2 mg  2 mg Intravenous Q4H PRN    ondansetron (ZOFRAN) injection 4 mg  4 mg Intravenous Q6H PRN    sodium chloride 0 9 % infusion  50 mL/hr Intravenous Continuous       Imaging:  No new imaging  Physical Exam:    General appearance: alert and oriented, in no acute distress, cooperative and moderately obese  Skin: B/L LE with edema and blisters forming to shins  L foot with dressing, c/d/i  Neurologic: Grossly normal  Lungs: clear to auscultation bilaterally and slight crackles to the right lower lobe   Heart: regular rate and rhythm, S1, S2 normal, no murmur, click, rub or gallop  Abdomen: soft, non-tender; bowel sounds normal; no masses,  no organomegaly  Extremities: +2 edema, non-pitting to B/L LE  Left foot eschar to heel which is currently wrapped in dressing  Distal tips of toes to the left foot remain with small necrotic areas, betadine to local wounds  Sensory decreased in B/L feet  Motor intact - able to flex foot, slight wiggle of toes B/L      Wound/Incision:  As above    Pulse exam:  Radial: Right: 2+ Left[de-identified] 2+  Femoral: Right: 2+ Left: 2+  Popliteal: Right: non-palpable Left: non-palpable  DP: Right: doppler signal Left: doppler signal  PT: Right: doppler signal Left: doppler signal      Estelita Ledezma, [address]  3/19/2018  [facility]  [phone]

## 2018-03-19 NOTE — ASSESSMENT & PLAN NOTE
L plantar heel unstageable eschar  --L foot x-ray negative for OM  --continue [address] per Podiatry, likely will need debridement after revascularization  --Plan for Agram today with possible intervention by IR  --continue Rocephin and Flagyl per primary service

## 2018-03-19 NOTE — ASSESSMENT & PLAN NOTE
2D echocardiogram from 03/15 reviewed  Unfortunately because of orthostatic hypotension no specific treatment for now  Cardiology to decide on timing of stress test based on angiogram findings

## 2018-03-19 NOTE — PROGRESS NOTES
Progress Note - Peterson Moore 1960, 61 y o  male MRN: [id number]    Unit/Bed#: E4 -01 Encounter: [id number]    Primary Care Provider: Marlena Ordonez DO   Date and time admitted to hospital: 3/14/2018  2:14 PM        * Non-healing wound of left heel   Assessment & Plan    Patient has an ongoing left heel infection for the past 1 month which is progressively worsened the past 2 weeks with eschar and gangrenous formation  DVT has been ruled out  Patient also had lower extremity arterial Dopplers on 03/06/18 which revealed diffuse atherosclerotic disease noted throughout the femoral popliteal arteries suggested of tibioperoneal disease  Concern of gangrenous formation of the heel and also the tips of the left foot  Xray is negative for any osteomyelitis, , CRP 79 5  Podiatry and vascular sx input appreciated  Continue with empiric antibiotics, day 6  (IV ceftriaxone plus Flagyl)  IR consulted for abdominal aortogram with left lower extremity runoff and possible endovascular intervention by IR on 3/19/2018  PAD (peripheral artery disease) ([facility] [address] )   Assessment & Plan    See above  Continue aspirin, Lipitor  Atypical chest pain   Assessment & Plan    Non cardiac in nature,   Troponin 0 56, 0 32, 0 43 --  Mild elevation of troponin  Suspicion of CAD  Cardiology on board, following patient  Continue with aspirin, statin, morphine as needed for pain  Hold of beta blockers given patient's history of orthostatic hypotension and diabetic autonomic neuropathy        Diabetic gastroparesis ([facility] [address] )   Assessment & Plan    Stringent blood sugar control is mandatory  Continued Reglan once daily  Diabetic autonomic neuropathy associated with type 2 diabetes mellitus ([facility] [address] )   Assessment & Plan    Continue with Lantus 70 units BID and sliding scale for coverage  HbA1c 9 4  Will hold beta-blockers, midodrine          Bilateral lower extremity edema   Assessment & Plan    2D echocardiogram from 03/15 reviewed  Unfortunately because of orthostatic hypotension no specific treatment for now  Cardiology to decide on timing of stress test based on angiogram findings  Morbid obesity ([address] )   Assessment & Plan    Lifestyle modifications        _____________________________________________________________________    SUBJECTIVE:   Patient seen and examined  Awaiting angiogram for today  He states he feels a little bit weak from not eating breakfast   Denies any acute new symptoms  OBJECTIVE:     Vitals:   HR:  [70-81] 74  Resp:  [18-20] 18  BP: (109-153)/(76-86) 127/79  SpO2:  [94 %-100 %] 94 %  Temp (24hrs), Av 9 °F (36 6 °C), Min:97 6 °F (36 4 °C), Max:98 3 °F (36 8 °C)  Current: Temperature: 97 6 °F (36 4 °C)    Intake/Output Summary (Last 24 hours) at 18 1420  Last data filed at 18 1301   Gross per 24 hour   Intake           968 33 ml   Output             2375 ml   Net         -1406 67 ml     Physical Exam:   GENERAL: AAO x 3  Morbid obesity  HEENT: atraumatic, normocephalic  Oral mucosa moist, no icterus, pallor  PERRLA +  Neck supple, no JVD, no lymphadenopathy, no thryomegaly  CHEST: B/L breath sounds heard, occasional wheezing  CVS: S1, S2  No cyanosis/clubbing or edema  ABDOMEN: Soft/obese/NT/BS heard  NEUROLOGICAL: CN II -XI grossly intact  No focal motor or sensory deficits  No signs of meningeal irritation or cerebellar dysfunction  EXTREMITIES:  Bilateral lower extremity edema      LABS:   3/19/2018 05:56   eGFR 88   Sodium 134 (L)   Potassium 4 5   Chloride 100   CO2 24   Anion Gap 10   BUN 13   Creatinine 0 95   Glucose 189 (H)   Calcium 9 1   WBC 8 27   RBC 4 18   Hemoglobin 11 7 (L)   Hematocrit 35 4 (L)   MCV 85   MCH 28 0   MCHC 33 1   RDW 13 0   Platelets 014   MPV 9 8   nRBC 0   Neutrophils Relative 72   Lymphocytes Relative 18   Monocytes Relative 7   Eosinophils Relative 2   Basophils Relative 1   Neutrophils Absolute 5 99   Lymphocytes Absolute 1 46 Monocytes Absolute 0 60   Eosinophils Absolute 0 18   Basophils Absolute 0 04     Scheduled Meds:    Current Facility-Administered Medications:  acetaminophen 650 mg Oral Q6H PRN Stefano Silva MD    aspirin 325 mg Oral Daily Stefano Silva MD    atorvastatin 40 mg Oral Daily Stefano Silva MD    calcium carbonate 500 mg Oral Daily PRN CARLTON Dubose    cefTRIAXone 1,000 mg Intravenous Q24H Stefano Silva MD Last Rate: Stopped (03/18/18 1846)   enoxaparin 40 mg Subcutaneous Daily Stefano Silva MD    insulin glargine 70 Units Subcutaneous HS Stefano Silva MD    insulin lispro 1-5 Units Subcutaneous TID AC Stefano Silva MD    lactulose 20 g Oral BID Stefano Silva MD    lactulose 200 g Rectal BID PRN Stefano Silva MD    LORazepam 0 5 mg Oral BID PRN Stefano Silva MD    metoclopramide 5 mg Oral Daily Stefano Silva MD    metroNIDAZOLE 500 mg Intravenous Q8H Stefano Silva MD Last Rate: 500 mg (03/19/18 0948)   morphine injection 2 mg Intravenous Q4H PRN Stefano Silva MD    ondansetron 4 mg Intravenous Q6H PRN Stefano Silva MD    sodium chloride 50 mL/hr Intravenous Continuous CARLTON Pang Last Rate: 50 mL/hr (03/19/18 0131)       Continuous Infusions:    sodium chloride 50 mL/hr Last Rate: 50 mL/hr (03/19/18 0131)       PRN Meds:    acetaminophen    calcium carbonate    lactulose    LORazepam    morphine injection    ondansetron    VTE Prophylaxis: Lovenox subcutaneous  Time Spent for Care: 20 minutes  More than 50% of total time spent on counseling and coordination of care as described above      Misty Bosworth, MD  HOSPITALIST SERVICES  3/19/2018

## 2018-03-19 NOTE — OCCUPATIONAL THERAPY NOTE
Occupational Therapy Treatment Note:         03/19/18 5514   Restrictions/Precautions   Weight Bearing Precautions Per Order Yes   LLE Weight Bearing Per Order NWB   Other Precautions Pain; Fall Risk   Pain Assessment   Pain Assessment No/denies pain   Pain Score No Pain   Diversional Activities Television   ADL   Where Assessed Chair   Eating Comments unable to assess  Upcoming testing  Will benefit from use of red foam for utensils and writing  Grooming Assistance 5  Supervision/Setup   Grooming Deficit Setup   UB Bathing Assistance 5  Supervision/Setup   UB Bathing Deficit Setup   UB Dressing Assistance 5  Supervision/Setup   UB Dressing Deficit Setup   Functional Standing Tolerance   Time unable to assess  Transfers   Sit to Stand Unable to assess   Functional Mobility   Additional Comments unable to assess  Cognition   Overall Cognitive Status WFL   Arousal/Participation Alert   Attention Within functional limits   Orientation Level Oriented X4   Memory Within functional limits   Following Commands Follows multistep commands with increased time or repetition   Comments cues to engage in positive conversation required  Assessment   Assessment Pt was seen for skilled OT with focus on review of energy conservation techs, fall prevention/home safety, review of current plan of care and extended review of leisure interests and personal goals  Pt required extended time to self express regarding current health status and noted deficits  Pt reports, I cant see going back to that house, It's not set up for me"  Encouraged Pt to identify problem areas but challenged Pt to write down possible solutions to problems to gain understanding of strategies to facilitate his personal goals  Pt had many reasons why he is not able to do most things needed during he daily routine  Encouraged Pt to list all things that he currently is able to do and to identify ways to cope with deficits   Educated Pt on use of hand held bidet to cleanse following BM due to limited functional reach  See above levels of A required for all functional tasks  Pt is now NWB in LLE  Pt kept in bedside chair during tx session with completion of UE self care and review of fall prevention/home safety  Pt may benefit from further rehab with focus on achieving optimal performance levels with all functional tasks  Written information provided for energy conservation techs and fall prevention  Plan   Treatment Interventions ADL retraining;Functional transfer training; Endurance training;UE strengthening/ROM   Goal Expiration Date 03/25/18   Treatment Day 1   OT Frequency 3-5x/wk   Recommendation   OT Discharge Recommendation Home OT   Equipment Recommended Bedside commode   Barthel Index   Feeding 10   Bathing 0   Grooming Score 5   Dressing Score 5   Bladder Score 10   Bowels Score 10   Toilet Use Score 5   Transfers (Bed/Chair) Score 10   Mobility (Level Surface) Score 0   Stairs Score 0   Barthel Index Score 55   Modified Thurston Scale   Modified Alana Scale 4   Bennett Nguyen, [address]

## 2018-03-19 NOTE — PLAN OF CARE
Problem: PHYSICAL THERAPY ADULT  Goal: Performs mobility at highest level of function for planned discharge setting  See evaluation for individualized goals  Treatment/Interventions: Functional transfer training, LE strengthening/ROM, Elevations, Therapeutic exercise, Endurance training, Patient/family training, Equipment eval/education, Bed mobility, Gait training, Spoke to nursing, OT  Equipment Recommended: Manda Stephens, Other (Comment) (RW, Commode)       See flowsheet documentation for full assessment, interventions and recommendations  Outcome: Progressing  Prognosis: Fair  Problem List: Decreased strength, Decreased range of motion, Decreased endurance, Impaired balance, Decreased mobility, Decreased safety awareness, Decreased skin integrity, Orthopedic restrictions  Assessment: Pt  seated in bedside chair upon my arrival  New WBing status ordered by podiatry for NWB of LLE, will acquire PT re-evaluation due to change in medical status  Performance of HEP seated in bedside chair  Required additional time in room due to emotional support of pt  as very emotional about change in WBing status  Remained seated in bedside chair at end of treatment session with no transfers performed this session, due to change in [place] as well as pt  reporting increased fatigue due to lack of sleep last night  At this time will defer recommendation until PT re-evaluation performed  Barriers to Discharge: Decreased caregiver support, Inaccessible home environment  Barriers to Discharge Comments: VITOR, pt's spouse 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  Recommendation: Other (Comment) (Defer at this time awaiting PT re-evaluation)     PT - OK to Discharge: No (PT reassessment due to change in [place] status )    See flowsheet documentation for full assessment

## 2018-03-19 NOTE — ASSESSMENT & PLAN NOTE
PAD w/LLE tissue loss (gangrene plantar aspect of left heel)  --ISAK 03/06/2018   -diffuse femoropopliteal disease w/ tibioperoneal disease bilaterally   -L YUDITH unreliable/103/51  --continue aspirin and statin  --continue [address] per podiatry  --for abdominal aortogram with left lower extremity runoff and possible endovascular intervention by IR on 3/19/2018  --final recommendations to follow results of angiogram  --antibiotics per primary service  --will d/w Dr Cara Howell

## 2018-03-19 NOTE — DISCHARGE INSTRUCTIONS
ARTERIOGRAM    WHAT YOU SHOULD KNOW:   An angiogram is a procedure to look at arteries in your body  Arteries are the blood vessels that carry blood from your heart to your body  AFTER YOU LEAVE:     Self-care:   · Limit activity: Rest for the remainder of the day of your procedure  Have some one with you until the next morning  Keep your arm or leg straight as much as possible  Rest as much as possible, sitting lying or reclining  Walk only to go to the bathroom, to bed or to eat  If the angiogram catheter was put in your leg, use the stairs as little as possible  No driving  · Keep your wound clean and dry  You may shower 24 hours after your procedure  The bandage you have on should fall off in 2-3 days  If there is any drainage from the puncture site, you should put on a clean bandage  · Watch for bleeding and bruising: It is normal to have a bruise and soreness where the angiogram catheter went in  · Diet:   · You may resume your regular diet, Sips of flat soda will help with mild nausea  · Drink more liquids than usual for the next 24 hours      · IMMEDIATELY Contact Interventional Radiology at [phone] Liam PATIENTS: Contact Interventional Radiology at [phone]) Elkin Lucero PATIENTS: Contact Interventional Radiology at [phone]) if any of the following occur:  · If your bruise gets larger or if you notice any active bleeding  APPLY DIRECT PRESSURE TO THE BLEEDING SITE  · If you notice increased swelling or have increased pain at the puncture site   · If you have any numbness or pain in the extremity of the puncture site   · If that extremity seems cold or pale      · You have fever greater than 101  · Persistent nausea or vomitting    Follow up with your primary healthcare provider  as directed: Write down your questions so you remember to ask them during your visits

## 2018-03-19 NOTE — PROGRESS NOTES
61 M, PAD, HTN, DM 2  Non healing left heel ulcer  Chart reviewed, labs checked    Stable for abdominal aortogram and left lower extremity arteriogram

## 2018-03-19 NOTE — ASSESSMENT & PLAN NOTE
Non cardiac in nature,   Troponin 0 56, 0 32, 0 43 --  Mild elevation of troponin  Suspicion of CAD  Cardiology on board, following patient  Continue with aspirin, statin, morphine as needed for pain    Hold of beta blockers given patient's history of orthostatic hypotension and diabetic autonomic neuropathy

## 2018-03-19 NOTE — PROGRESS NOTES
Patient stated "I feel light headed, just don't feel right  Maybe my blood pressure or blood sugar is low or it could be from just moving my bowels "  BP and blood glucose checked and WNL  Patient may be anxious about Agram scheduled for later today

## 2018-03-19 NOTE — ASSESSMENT & PLAN NOTE
Continue with Lantus 70 units BID and sliding scale for coverage  HbA1c 9 4  Will hold beta-blockers, midodrine

## 2018-03-19 NOTE — ASSESSMENT & PLAN NOTE
Patient has an ongoing left heel infection for the past 1 month which is progressively worsened the past 2 weeks with eschar and gangrenous formation  DVT has been ruled out  Patient also had lower extremity arterial Dopplers on 03/06/18 which revealed diffuse atherosclerotic disease noted throughout the femoral popliteal arteries suggested of tibioperoneal disease  Concern of gangrenous formation of the heel and also the tips of the left foot  Xray is negative for any osteomyelitis, , CRP 79 5  Podiatry and vascular sx input appreciated  Continue with empiric antibiotics, day 6  (IV ceftriaxone plus Flagyl)  IR consulted for abdominal aortogram with left lower extremity runoff and possible endovascular intervention by IR on 3/19/2018

## 2018-03-19 NOTE — PHYSICAL THERAPY NOTE
Physical Therapy Progress Note     03/19/18 0903   Pain Assessment   Pain Assessment No/denies pain   Pain Score No Pain   Restrictions/Precautions   Weight Bearing Precautions Per Order Yes   LLE Weight Bearing Per Order NWB   Other Precautions Pain; Fall Risk;WBS   General   Chart Reviewed Yes   Response to Previous Treatment Patient reporting fatigue but able to participate  Family/Caregiver Present No   Subjective   Subjective "I'm really down "   Balance   Static Sitting Fair +   Dynamic Sitting Fair   Endurance Deficit   Endurance Deficit Yes   Endurance Deficit Description fatigue/medical   Activity Tolerance   Activity Tolerance Treatment limited secondary to medical complications (Comment); Patient limited by fatigue   Nurse Made Aware Yes   Exercises   TKR Sitting;15 reps;AROM; Bilateral  (x 2 sets)   Assessment   Prognosis Fair   Problem List Decreased strength;Decreased range of motion;Decreased endurance; Impaired balance;Decreased mobility; Decreased safety awareness;Decreased skin integrity;Orthopedic restrictions   Assessment Pt  seated in bedside chair upon my arrival  New WBing status ordered by podiatry for NWB of LLE, will acquire PT re-evaluation due to change in medical status  Performance of HEP seated in bedside chair  Required additional time in room due to emotional support of pt  as very emotional about change in WBing status  Remained seated in bedside chair at end of treatment session with no transfers performed this session, due to change in [place] as well as pt  reporting increased fatigue due to lack of sleep last night  At this time will defer recommendation until PT re-evaluation performed  Barriers to Discharge Decreased caregiver support; Inaccessible home environment   Barriers to Discharge Comments VITOR, pt's spouse 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   Goals   Patient Goals To rest    STG Expiration Date 03/25/18   Treatment Day 1   Plan   Treatment/Interventions ANTHONY strengthening/ROM; Functional transfer training; Therapeutic exercise; Endurance training;Spoke to nursing;Spoke to case management;OT   Progress Slow progress, medical status limitations   PT Frequency 5x/wk   Recommendation   Recommendation Other (Comment)  (Defer at this time awaiting PT re-evaluation)   Equipment Recommended Walker; Other (Comment)  (RW, commode)   PT - OK to Discharge No  (PT reassessment due to change in [address] status )     Christofer Escobedo, PTA

## 2018-03-19 NOTE — BRIEF OP NOTE (RAD/CATH)
LEFT LOWER EXTREMITY ARTERIOGRAM:  Procedure Note    PATIENT NAME: Tri Valera  : 1960  MRN: [id number]     Pre-op Diagnosis:   1  Non-healing wound of left heel    2  Dyspnea    3  T wave inversion in EKG    4  Elevated troponin    5  Hyperglycemia    6  Diabetic ulcer of left heel associated with type 2 diabetes mellitus, limited to breakdown of skin (HCC)      Post-op Diagnosis:   1  Non-healing wound of left heel    2  Dyspnea    3  T wave inversion in EKG    4  Elevated troponin    5  Hyperglycemia    6  Diabetic ulcer of left heel associated with type 2 diabetes mellitus, limited to breakdown of skin [facility])        Surgeon:   Adeline Johnson MD  Assistants:     No qualified resident was available  Estimated Blood Loss: None  Findings: 5 cm segment chronic occlusion left popliteal artery crossing knee joint  Unsuccessful attempt to cross  3 vessel runoff  Specimens: None  Complications:  None      Anesthesia: Conscious sedation and María Echevarria MD     Date: 3/19/2018  Time: 6:07 PM

## 2018-03-20 ENCOUNTER — APPOINTMENT (INPATIENT)
Dept: NON INVASIVE DIAGNOSTICS | Facility: HOSPITAL | Age: 58
DRG: 264 | End: 2018-03-20
Payer: COMMERCIAL

## 2018-03-20 LAB
ANION GAP SERPL CALCULATED.3IONS-SCNC: 11 MMOL/L (ref 4–13)
BACTERIA BLD CULT: NORMAL
BACTERIA BLD CULT: NORMAL
BUN SERPL-MCNC: 12 MG/DL (ref 5–25)
CALCIUM SERPL-MCNC: 8.8 MG/DL (ref 8.3–10.1)
CHLORIDE SERPL-SCNC: 99 MMOL/L (ref 100–108)
CO2 SERPL-SCNC: 25 MMOL/L (ref 21–32)
CREAT SERPL-MCNC: 1 MG/DL (ref 0.6–1.3)
ERYTHROCYTE [DISTWIDTH] IN BLOOD BY AUTOMATED COUNT: 13.1 % (ref 11.6–15.1)
GFR SERPL CREATININE-BSD FRML MDRD: 83 ML/MIN/1.73SQ M
GLUCOSE SERPL-MCNC: 205 MG/DL (ref 65–140)
GLUCOSE SERPL-MCNC: 207 MG/DL (ref 65–140)
GLUCOSE SERPL-MCNC: 221 MG/DL (ref 65–140)
GLUCOSE SERPL-MCNC: 228 MG/DL (ref 65–140)
GLUCOSE SERPL-MCNC: 242 MG/DL (ref 65–140)
HCT VFR BLD AUTO: 33.9 % (ref 36.5–49.3)
HGB BLD-MCNC: 10.8 G/DL (ref 12–17)
MCH RBC QN AUTO: 27.3 PG (ref 26.8–34.3)
MCHC RBC AUTO-ENTMCNC: 31.9 G/DL (ref 31.4–37.4)
MCV RBC AUTO: 86 FL (ref 82–98)
PLATELET # BLD AUTO: 270 THOUSANDS/UL (ref 149–390)
PMV BLD AUTO: 10.2 FL (ref 8.9–12.7)
POTASSIUM SERPL-SCNC: 4.2 MMOL/L (ref 3.5–5.3)
RBC # BLD AUTO: 3.95 MILLION/UL (ref 3.88–5.62)
SODIUM SERPL-SCNC: 135 MMOL/L (ref 136–145)
WBC # BLD AUTO: 7.98 THOUSAND/UL (ref 4.31–10.16)

## 2018-03-20 PROCEDURE — 99232 SBSQ HOSP IP/OBS MODERATE 35: CPT | Performed by: INTERNAL MEDICINE

## 2018-03-20 PROCEDURE — 80048 BASIC METABOLIC PNL TOTAL CA: CPT | Performed by: INTERNAL MEDICINE

## 2018-03-20 PROCEDURE — 97535 SELF CARE MNGMENT TRAINING: CPT

## 2018-03-20 PROCEDURE — 93971 EXTREMITY STUDY: CPT

## 2018-03-20 PROCEDURE — 97530 THERAPEUTIC ACTIVITIES: CPT

## 2018-03-20 PROCEDURE — 99232 SBSQ HOSP IP/OBS MODERATE 35: CPT | Performed by: PHYSICIAN ASSISTANT

## 2018-03-20 PROCEDURE — 85027 COMPLETE CBC AUTOMATED: CPT | Performed by: INTERNAL MEDICINE

## 2018-03-20 PROCEDURE — 97116 GAIT TRAINING THERAPY: CPT

## 2018-03-20 PROCEDURE — 97110 THERAPEUTIC EXERCISES: CPT

## 2018-03-20 PROCEDURE — 82948 REAGENT STRIP/BLOOD GLUCOSE: CPT

## 2018-03-20 RX ADMIN — INSULIN LISPRO 2 UNITS: 100 INJECTION, SOLUTION INTRAVENOUS; SUBCUTANEOUS at 12:32

## 2018-03-20 RX ADMIN — METRONIDAZOLE 500 MG: 500 INJECTION, SOLUTION INTRAVENOUS at 17:21

## 2018-03-20 RX ADMIN — METRONIDAZOLE 500 MG: 500 INJECTION, SOLUTION INTRAVENOUS at 09:52

## 2018-03-20 RX ADMIN — CEFTRIAXONE 1000 MG: 1 INJECTION, SOLUTION INTRAVENOUS at 18:05

## 2018-03-20 RX ADMIN — INSULIN LISPRO 1 UNITS: 100 INJECTION, SOLUTION INTRAVENOUS; SUBCUTANEOUS at 17:21

## 2018-03-20 RX ADMIN — INSULIN GLARGINE 70 UNITS: 100 INJECTION, SOLUTION SUBCUTANEOUS at 22:14

## 2018-03-20 RX ADMIN — METOCLOPRAMIDE HYDROCHLORIDE 5 MG: 10 TABLET ORAL at 08:33

## 2018-03-20 RX ADMIN — ATORVASTATIN CALCIUM 40 MG: 40 TABLET, FILM COATED ORAL at 17:21

## 2018-03-20 RX ADMIN — INSULIN LISPRO 1 UNITS: 100 INJECTION, SOLUTION INTRAVENOUS; SUBCUTANEOUS at 08:31

## 2018-03-20 RX ADMIN — ENOXAPARIN SODIUM 40 MG: 40 INJECTION SUBCUTANEOUS at 08:32

## 2018-03-20 RX ADMIN — ASPIRIN 325 MG: 325 TABLET, COATED ORAL at 08:32

## 2018-03-20 RX ADMIN — METRONIDAZOLE 500 MG: 500 INJECTION, SOLUTION INTRAVENOUS at 01:18

## 2018-03-20 NOTE — ASSESSMENT & PLAN NOTE
Abdominal aortogram with left lower extremity runoff and possible endovascular intervention by IR on 3/19/2018, with unsuccessful attempt to cross chronic occlusion left popliteal artery crossing knee joint ; 3 vessel runoff  Await Vascular Sx input  Continue with empiric antibiotics, day 7  (IV ceftriaxone plus Flagyl)  Patient has an ongoing left heel infection for the past 1 month which is progressively worsened the past 2 weeks with eschar and gangrenous formation  DVT has been ruled out  Patient also had lower extremity arterial Dopplers on 03/06/18 which revealed diffuse atherosclerotic disease noted throughout the femoral popliteal arteries suggested of tibioperoneal disease  Concern of gangrenous formation of the heel and also the tips of the left foot  Xray is negative for any osteomyelitis, , CRP 79 5  Podiatry and vascular sx input appreciated

## 2018-03-20 NOTE — ASSESSMENT & PLAN NOTE
L plantar heel unstageable eschar, s/p bedside debridement 3/18/18  --L foot x-ray negative for OM  --continue [address] per Podiatry  --continue Rocephin and Flagyl per primary service  --s/p unsuccessful recannulization attempt mid L popliteal occlusion 3/19/18  --will require surgical revascularization LLE after cardiology clearance complete

## 2018-03-20 NOTE — ASSESSMENT & PLAN NOTE
PAD w/LLE tissue loss (eschar plantar aspect of left heel), s/p diagnostic LLE angiogram, unsuccessful recanalization L popliteal  (IR) 3/19/2018  --angiogram images and case discussed with Dr Darin Black  Recommend formal surgical revascularization w/L fem-BK popliteal bypass grafting  --will obtain BLE vein mapping  --d/w Dr Everett Murray  +RWMA on TTE  Nuclear stress test planned for tomorrow for preoperative risk stratification  --continue aspirin and statin  --continue [address] per podiatry  --continue antibiotics per primary service  --OR case scheduling pending Cardiology clearance and vein mapping  --LLE bypass grafting discussed with patient at length  Patient expressed understanding and agrees to proceed

## 2018-03-20 NOTE — OCCUPATIONAL THERAPY NOTE
OccupationalTherapy Progress Note     Patient Name: Feliberto Marie  Today's Date: 3/20/2018  Problem List  Patient Active Problem List   Diagnosis    Anxiety and depression    Benign essential HTN    Diabetic autonomic neuropathy associated with type 2 diabetes mellitus ([place] [address] )    Diabetic neuropathy, type II diabetes mellitus ([place] [address] )    Diabetic peripheral neuropathy ([place] [address] )    Hyperlipidemia    Type 2 diabetes mellitus with diabetic autonomic neuropathy, with long-term current use of insulin ([facility])    Vitamin D deficiency    PAD (peripheral artery disease) ([facility])    Gangrene ([place] [address] )    Hyperglycemia    T wave inversion in EKG    Non-healing wound of left heel    Atypical chest pain    Orthostatic hypotension    Diabetic gastroparesis ([facility])    Bilateral lower extremity edema    Morbid obesity ([place] [address] )               03/20/18 1424   Restrictions/Precautions   Weight Bearing Precautions Per Order Yes   LLE Weight Bearing Per Order NWB   Other Precautions Fall Risk;Pain;Telemetry;Multiple lines;WBS  (NWB L LE)   Pain Assessment   Pain Assessment No/denies pain   Pain Score No Pain   ADL   Where Assessed Chair   Eating Assistance 4  Minimal Assistance   Eating Deficit Setup; Adaptive utensils (Comment)   Eating Comments provided red foam for utensils, pt simulate use of utensils and plans to trial at dinner   Grooming Assistance 5  Supervision/Setup   Grooming Deficit Setup   UB Bathing Assistance 5  Supervision/Setup   UB Bathing Deficit Setup; Increased time to complete   LB Bathing Assistance 4  Minimal Assistance   LB Bathing Deficit Setup; Increased time to complete;Left lower leg including foot;Right lower leg including foot   LB Bathing Comments weightshifting in chair to wash bottom   UB Dressing Assistance 5  Supervision/Setup   UB Dressing Deficit Setup   UB Dressing Comments don/doff hospital gown   LB Dressing Assistance 2  Maximal Assistance   LB Dressing Deficit Setup;Don/doff L sock; Don/doff R sock   LB Dressing Comments impaired functional reach   Functional Standing Tolerance   Time 1 minute    Activity w/ cues to maintain NWB on L LE   Bed Mobility   Additional Comments pt seated in bedside chair upon arrival   Transfers   Sit to Stand 4  Minimal assistance   Additional items Assist x 2; Increased time required;Verbal cues;Armrests   Stand to Sit 3  Moderate assistance   Additional items Assist x 2; Increased time required;Verbal cues;Armrests  (cues for positioning and controlled descent)   Additional Comments VCs for NWB on L LE, able to maintain NWB on L LE 75% of time   Functional Mobility   Functional Mobility 3  Moderate assistance   Additional Comments assist x 2 w/ unilateral hopping on R LE; able to maintain NWB L LE 75% of time   Cognition   Overall Cognitive Status [facility]   Arousal/Participation Alert; Cooperative   Attention Within functional limits   Orientation Level Oriented to person;Oriented to place;Oriented to time   Memory Within functional limits   Following Commands Follows one step commands without difficulty   Comments pt engages in appropriate conversations   Additional Activities   Additional Activities (education on use of red foam utensils to assist w/ self-feed)   Additional Activities Comments impaired sensation of hands   Activity Tolerance   Activity Tolerance Patient limited by fatigue;Treatment limited secondary to medical complications (Comment)   Medical Staff Made Aware appropriate to see per Saul RICHMOND   Assessment   Assessment Pt seen for skilled OT session focused on ADLs, functional transfers and mobility and compensatory techniques  Pt seated in bedside chair upon arrival  Pt w/ setup assistance for grooming tasks of combing hair, use of shampoo cap for washing hair  Pt w/ setup  For UB washing and dressing  Pt required MIN assist to wash lower LE and foot  Pt completed weightshifting to wash bottom while seated in chair   Pt required MAX assist to don/doff socks while seated 2* impaired functional reach  Pt provided w/ red foam for utensils to enhance  for self-feeding due to impaired sensation; pt simulated on fork and able to don/doff himself  Pt to trial w/ dinner  Pt required MIN assist x2 sit>stand from chair w/ VCs for positioning  Pt required MOD assist x 2 w/ RW for functional mobility w/ recliner chair follow and able to maintain NWB on L LE 75% of time  Pt fatigued at end and required MOD assist x 2 stand>sit to bedside recliner w/ cues for UE positioning and controlled descent  Pt seated in bedside chair at end of session w/ all needs met  Pt continues to be limited due to decreased strength and endurance, impaired balance, impaired activity tolerance, impaired functional reach, NWB on L LE, decreased sensation all causing a decline in ADLs, functional transfers and mobility  Recommend short term rehab when medically stable  Will continue to follow to address OT goals  Extend goals 10 days  Additional goal pt will maintain NWB on % of time during OT session     Plan   Treatment Interventions ADL retraining   Goal Expiration Date 03/30/18   Treatment Day 2   OT Frequency 3-5x/wk   Recommendation   OT Discharge Recommendation Short Term Rehab   Equipment Recommended Bedside commode   Barthel Index   Feeding 10   Bathing 0   Grooming Score 5   Dressing Score 5   Bladder Score 10   Bowels Score 10   Toilet Use Score 5   Transfers (Bed/Chair) Score 5   Mobility (Level Surface) Score 0   Stairs Score 0   Barthel Index Score 50   Modified [place] Scale   Modified [place] Scale 4     Documentation completed by: Tashi San MS, OTR/L

## 2018-03-20 NOTE — PROGRESS NOTES
Progress Note - Cardiology   [place] 61 y o  male MRN: [id number]  Unit/Bed#: E4 -01 Encounter: [id number]      Assessment:     1  Nonhealing wound of left heel/4th digit DTI  2  Peripheral arterial disease  3  Probable coronary artery disease with wall motion abnormality seen on echocardiogram-mild elevation of troponin  4  Uncontrolled diabetes  5  Orthostatic hypotension  6  Chronic peripheral edema  7  Dyslipidemia    Discussion/Recommendations    Probable underlying CAD-can consider stress test ? Timing  Blood pressure acceptable-history of orthostasis  Just told by vascular planning bypass - will order stress for tomorrow       Subjective:  No chest pain or trouble breathing      Physical Exam:  GEN:  NAD  HEENT:  MMM, NCAT, pink conjunctiva, EOMI, nonicteric sclera  CV:  NO JVD/HJR, RR, NO M/R/G, +S1/S2, NO PARASTERNAL HEAVE/THRILL, NO LE EDEMA, NO HEPATIC SYSTOLIC PULSATION, WARM EXTREMITIES  RESP:  CTAB/L  ABD:  SOFT, NT, NO GROSS ORGANOMEGALY        Vitals:   /65 (BP Location: Left arm)   Pulse 71   Temp 97 5 °F (36 4 °C) (Tympanic)   Resp 17   Ht 6' 1" (1 854 m)   Wt 117 kg (258 lb 9 6 oz)   SpO2 98%   BMI 34 12 kg/m²   Vitals:    03/14/18 1704 03/15/18 0600   Weight: 117 kg (257 lb 4 4 oz) 117 kg (258 lb 9 6 oz)       Intake/Output Summary (Last 24 hours) at 03/20/18 1013  Last data filed at 03/19/18 1945   Gross per 24 hour   Intake              200 ml   Output              300 ml   Net             -100 ml       Lab Results:    Results from last 7 days  Lab Units 03/20/18  0455   WBC Thousand/uL 7 98   HEMOGLOBIN g/dL 10 8*   HEMATOCRIT % 33 9*   PLATELETS Thousands/uL 270       Results from last 7 days  Lab Units 03/20/18  0455  03/15/18  0546   SODIUM mmol/L 135*  < > 135*   POTASSIUM mmol/L 4 2  < > 4 0   CHLORIDE mmol/L 99*  < > 101   CO2 mmol/L 25  < > 25   BUN mg/dL 12  < > 13   CREATININE mg/dL 1 00  < > 1 08   CALCIUM mg/dL 8 8  < > 8 8   TOTAL PROTEIN g/dL --   --  7 6   BILIRUBIN TOTAL mg/dL  --   --  0 37   ALK PHOS U/L  --   --  50   ALT U/L  --   --  20   AST U/L  --   --  22   GLUCOSE RANDOM mg/dL 221*  < > 114   < > = values in this interval not displayed      Results from last 7 days  Lab Units 03/20/18  0455   SODIUM mmol/L 135*   POTASSIUM mmol/L 4 2   CHLORIDE mmol/L 99*   CO2 mmol/L 25   BUN mg/dL 12   CREATININE mg/dL 1 00   GLUCOSE RANDOM mg/dL 221*   CALCIUM mg/dL 8 8       Results from last 7 days  Lab Units 03/16/18  0518   CHOLESTEROL mg/dL 85         Medications:    Current Facility-Administered Medications:     acetaminophen (TYLENOL) tablet 650 mg, 650 mg, Oral, Q6H PRN, Duane Diss, MD    aspirin (ECOTRIN) EC tablet 325 mg, 325 mg, Oral, Daily, Duane Diss, MD, 325 mg at 03/20/18 8293    atorvastatin (LIPITOR) tablet 40 mg, 40 mg, Oral, Daily, Duane Diss, MD, 40 mg at 03/19/18 1906    calcium carbonate (TUMS) chewable tablet 500 mg, 500 mg, Oral, Daily PRN, KALLI GranadoNP, 500 mg at 03/19/18 0106    cefTRIAXone (ROCEPHIN) IVPB (premix) 1,000 mg, 1,000 mg, Intravenous, Q24H, Duane Diss, MD, Stopped at 03/19/18 1859    docusate sodium (COLACE) capsule 100 mg, 100 mg, Oral, BID, Priti Hirsch MD    enoxaparin (LOVENOX) subcutaneous injection 40 mg, 40 mg, Subcutaneous, Daily, Duane Diss, MD, 40 mg at 03/20/18 2079    insulin glargine (LANTUS) subcutaneous injection 70 Units, 70 Units, Subcutaneous, HS, Duane Diss, MD, 35 Units at 03/19/18 2219    insulin lispro (HumaLOG) 100 units/mL subcutaneous injection 1-5 Units, 1-5 Units, Subcutaneous, TID AC, 1 Units at 03/20/18 0831 **AND** [CANCELED] Fingerstick Glucose (POCT), , , TID AC, Duane Diss, MD    lactulose 20 g/30 mL oral solution 20 g, 20 g, Oral, BID, Duane Diss, MD, 20 g at 03/18/18 1213    lactulose retention enema 200 g, 200 g, Rectal, BID PRN, Duane Diss, MD    LORazepam (ATIVAN) tablet 0 5 mg, 0 5 mg, Oral, BID PRN, Duane Diss, MD    metoclopramide (REGLAN) tablet 5 mg, 5 mg, Oral, Daily, Evelyn Guzman MD, 5 mg at 03/20/18 3570    metroNIDAZOLE (FLAGYL) IVPB (premix) 500 mg, 500 mg, Intravenous, Q8H, Evelyn Guzamn MD, Last Rate: 200 mL/hr at 03/20/18 0952, 500 mg at 03/20/18 2340    morphine injection 2 mg, 2 mg, Intravenous, Q4H PRN, Evelyn Guzman MD    ondansetron [facility]) injection 4 mg, 4 mg, Intravenous, Q6H PRN, Evelyn Guzman MD    sodium chloride 0 9 % infusion, 50 mL/hr, Intravenous, Continuous, CARLTON Sargent, Stopped at 03/19/18 1745    Portions of the record may have been created with voice recognition software  Occasional wrong word or "sound a like" substitutions may have occurred due to the inherent limitations of voice recognition software  Read the chart carefully and recognize, using context, where substitutions have occurred

## 2018-03-20 NOTE — PLAN OF CARE
Problem: OCCUPATIONAL THERAPY ADULT  Goal: Performs self-care activities at highest level of function for planned discharge setting  See evaluation for individualized goals  Treatment Interventions: ADL retraining, Functional transfer training, Endurance training, UE strengthening/ROM, Cognitive reorientation, Patient/family training, Equipment evaluation/education, Compensatory technique education, Energy conservation, Activityengagement  Equipment Recommended: Bedside commode       See flowsheet documentation for full assessment, interventions and recommendations  Limitation: Decreased ADL status, Decreased UE strength, Decreased Safe judgement during ADL, Decreased endurance, Decreased cognition, Decreased self-care trans, Decreased high-level ADLs, Decreased sensation  Prognosis: Good  Assessment: Pt seen for skilled OT session focused on ADLs, functional transfers and mobility and compensatory techniques  Pt seated in bedside chair upon arrival  Pt w/ setup assistance for grooming tasks of combing hair, use of shampoo cap for washing hair  Pt w/ setup  For UB washing and dressing  Pt required MIN assist to wash lower LE and foot  Pt completed weightshifting to wash bottom while seated in chair  Pt required MAX assist to don/doff socks while seated 2* impaired functional reach  Pt provided w/ red foam for utensils to enhance  for self-feeding due to impaired sensation; pt simulated on fork and able to don/doff himself  Pt to trial w/ dinner  Pt required MIN assist x2 sit>stand from chair w/ VCs for positioning  Pt required MOD assist x 2 w/ RW for functional mobility w/ recliner chair follow and able to maintain NWB on L LE 75% of time  Pt fatigued at end and required MOD assist x 2 stand>sit to bedside recliner w/ cues for UE positioning and controlled descent  Pt seated in bedside chair at end of session w/ all needs met   Pt continues to be limited due to decreased strength and endurance, impaired balance, impaired activity tolerance, impaired functional reach, NWB on L LE, decreased sensation all causing a decline in ADLs, functional transfers and mobility  Recommend short term rehab when medically stable  Will continue to follow to address OT goals  Extend goals 10 days  Additional goal pt will maintain NWB on % of time during OT session       OT Discharge Recommendation: Short Term Rehab         Comments: Baldo Beebe MS, OTR/L

## 2018-03-20 NOTE — PROGRESS NOTES
Progress Note - Feliberto Marie 1960, 61 y o  male MRN: [id number]    Unit/Bed#: E4 -01 Encounter: [id number]    Primary Care Provider: Chito Villanueva DO   Date and time admitted to hospital: 3/14/2018  2:14 PM        * Non-healing wound of left heel   Assessment & Plan    Abdominal aortogram with left lower extremity runoff and possible endovascular intervention by IR on 3/19/2018, with unsuccessful attempt to cross chronic occlusion left popliteal artery crossing knee joint ; 3 vessel runoff  Await Vascular Sx input  Continue with empiric antibiotics, day 7  (IV ceftriaxone plus Flagyl)  Patient has an ongoing left heel infection for the past 1 month which is progressively worsened the past 2 weeks with eschar and gangrenous formation  DVT has been ruled out  Patient also had lower extremity arterial Dopplers on 03/06/18 which revealed diffuse atherosclerotic disease noted throughout the femoral popliteal arteries suggested of tibioperoneal disease  Concern of gangrenous formation of the heel and also the tips of the left foot  Xray is negative for any osteomyelitis, , CRP 79 5  Podiatry and vascular sx input appreciated  PAD (peripheral artery disease) ([facility] )   Assessment & Plan    See above  Continue aspirin, Lipitor  Atypical chest pain   Assessment & Plan    Non cardiac in nature,   Troponin 0 56, 0 32, 0 43 --  Mild elevation of troponin  Suspicion of CAD  Cardiology on board, following patient  Continue with aspirin, statin, morphine as needed for pain  Hold  beta blockers given patient's history of orthostatic hypotension and diabetic autonomic neuropathy  Further cardiac work up as per Cardiology  Diabetic autonomic neuropathy associated with type 2 diabetes mellitus ([facility] )   Assessment & Plan    Continue with Lantus 70 units BID and sliding scale for coverage  HbA1c 9 4  Will hold beta-blockers, midodrine          Bilateral lower extremity edema   Assessment & Plan    2D echocardiogram from 03/15 reviewed  Unfortunately because of orthostatic hypotension no specific treatment for now  Cardiology to decide on timing of stress test based on angiogram findings  Diabetic gastroparesis ([facility] )   Assessment & Plan    Stringent blood sugar control is mandatory  Continued Reglan once daily  Morbid obesity ([facility] )   Assessment & Plan    Lifestyle modifications        Diabetic neuropathy, type II diabetes mellitus ([facility] )   Assessment & Plan    Continue with Lantus and sliding scale insulin  Blood sugars reasonable                 ______________________________________________________________________    SUBJECTIVE:   Patient seen and examined  He appears comfortable  Offers no complaints  Patient anxious about vascular surgery input  OBJECTIVE:     Vitals:   HR:  [68-80] 71  Resp:  [17-18] 17  BP: ()/(55-98) 108/65  SpO2:  [97 %-100 %] 98 %  Temp (24hrs), Av 7 °F (36 5 °C), Min:97 °F (36 1 °C), Max:98 6 °F (37 °C)  Current: Temperature: 97 5 °F (36 4 °C)    Intake/Output Summary (Last 24 hours) at 18 2200  Last data filed at 18 1945   Gross per 24 hour   Intake              200 ml   Output              300 ml   Net             -100 ml       Physical Exam:   GENERAL: AAO x 3  Morbid obesity  HEENT: atraumatic, normocephalic  Oral mucosa moist, no icterus, pallor  PERRLA +  Neck supple, no JVD, no lymphadenopathy, no thryomegaly  CHEST: B/L breath sounds heard, occasional wheezing  CVS: S1, S2  No cyanosis/clubbing or edema  ABDOMEN: Soft/obese/NT/BS heard  NEUROLOGICAL: CN II -XI grossly intact  No focal motor or sensory deficits  No signs of meningeal irritation or cerebellar dysfunction  EXTREMITIES:  Bilateral lower extremity edema      LABS:  Results for Amadou Joaquin (MRN [id number]) as of 3/20/2018 09:36   3/20/2018 04:55   eGFR 83   Sodium 135 (L)   Potassium 4 2   Chloride 99 (L)   CO2 25   Anion Gap 11   BUN 12   Creatinine 1 00 Glucose 221 (H)   Calcium 8 8   WBC 7 98   RBC 3 95   Hemoglobin 10 8 (L)   Hematocrit 33 9 (L)   MCV 86   MCH 27 3   MCHC 31 9   RDW 13 1   Platelets 142   MPV 37 8     Scheduled Meds:    Current Facility-Administered Medications:  acetaminophen 650 mg Oral Q6H PRN Chapin Talbot MD    aspirin 325 mg Oral Daily Chapin Talbot MD    atorvastatin 40 mg Oral Daily Chapin Talbot MD    calcium carbonate 500 mg Oral Daily PRN CARLTON Stinson    cefTRIAXone 1,000 mg Intravenous Q24H Chapin Talbot MD Last Rate: Stopped (03/19/18 1859)   docusate sodium 100 mg Oral BID Priti Vo MD    enoxaparin 40 mg Subcutaneous Daily Chapin Talbot MD    insulin glargine 70 Units Subcutaneous HS Chapin Talbot MD    insulin lispro 1-5 Units Subcutaneous TID AC Chapin Talbot MD    lactulose 20 g Oral BID Chapin Talbot MD    lactulose 200 g Rectal BID PRN Chapin Talbot MD    LORazepam 0 5 mg Oral BID PRN Chapin Talbot MD    metoclopramide 5 mg Oral Daily Chapin Talbot MD    metroNIDAZOLE 500 mg Intravenous Q8H Chapin Talobt MD Last Rate: 500 mg (03/20/18 0118)   morphine injection 2 mg Intravenous Q4H PRN Chapin Talbot MD    ondansetron 4 mg Intravenous Q6H PRN Chapin Talbot MD    sodium chloride 50 mL/hr Intravenous Continuous CARLTON Pang Last Rate: Stopped (03/19/18 1745)       Continuous Infusions:    sodium chloride 50 mL/hr Last Rate: Stopped (03/19/18 1745)       PRN Meds:    acetaminophen    calcium carbonate    lactulose    LORazepam    morphine injection    ondansetron      VTE Prophylaxis:Enoxaparin  DISPOSITION: Pending clinical stability  Expect a prolonged hospitalization  Time Spent for Care: 20 minutes   More than 50% of total time spent on counseling and coordination of care as described above  Family will be updated  Peter Da Silva MD  HOSPITALIST SERVICES  3/20/2018    PLEASE NOTE:  This encounter was completed utilizing the M- [employer]Fluency Direct Speech Voice Recognition Software  Grammatical errors, random word insertions, pronoun errors and incomplete sentences are occasional consequences of the system due to software limitations, ambient noise and hardware issues  These may be missed by proof reading prior to affixing electronic signature  Any questions or concerns about the content, text or information contained within the body of this dictation should be directly addressed to the physician for clarification   Please do not hesitate to call me directly if you have any any questions or concerns

## 2018-03-20 NOTE — PROGRESS NOTES
Progress Note - Florian Gearing 1960, 61 y o  male MRN: [id number]    Unit/Bed#: E4 -01 Encounter: [id number]    Primary Care Provider: Theresa Padilla DO   Date and time admitted to hospital: 3/14/2018  2:14 PM        PAD (peripheral artery disease) ([facility] )   Assessment & Plan    PAD w/LLE tissue loss (eschar plantar aspect of left heel), s/p diagnostic LLE angiogram, unsuccessful recanalization L popliteal  (IR) 3/19/2018  --angiogram images and case discussed with Dr Hilda Osuna  Recommend formal surgical revascularization w/L fem-BK popliteal bypass grafting  --will obtain BLE vein mapping  --d/w Dr Mina Anguiano  +RWMA on TTE  Nuclear stress test planned for tomorrow for preoperative risk stratification  --continue aspirin and statin  --continue [address] per podiatry  --continue antibiotics per primary service  --OR case scheduling pending Cardiology clearance and vein mapping  --LLE bypass grafting discussed with patient at length  Patient expressed understanding and agrees to proceed              * Non-healing wound of left heel   Assessment & Plan    L plantar heel unstageable eschar, s/p bedside debridement 3/18/18  --L foot x-ray negative for OM  --continue [address] per Podiatry  --continue Rocephin and Flagyl per primary service  --s/p unsuccessful recannulization attempt mid L popliteal occlusion 3/19/18  --will require surgical revascularization LLE after cardiology clearance complete          Diabetic neuropathy, type II diabetes mellitus ([facility] 75 )   Assessment & Plan    Poorly controlled diabetic, with recent HgbA1c of 9 4  --optimize BS control for optimization of wound healing and prevention disease  --continue current medical regimen per primary service        Atypical chest pain   Assessment & Plan    Preserved LV function w/RWMA on TTE (moderate HK basal inferior wall)  --nuclear stress test tomorrow  --d/w cardiology        Hyperlipidemia   Assessment & Plan    --Continue statin therapy        Benign essential HTN   Assessment & Plan    --continue current medical regimen per primary service              Subjective:  Patient without complaints  Denies lower extremity rest pain  Denies right groin access site pain or mass  Remains on antibiotics  VSS  no temp spikes  BC negative x 4 days  Vitals:  /93 (BP Location: Left arm)   Pulse 72   Temp 97 5 °F (36 4 °C) (Tympanic)   Resp 17   Ht 6' 1" (1 854 m)   Wt 117 kg (258 lb 9 6 oz)   SpO2 98%   BMI 34 12 kg/m²     I/Os:  I/O last 3 completed shifts: In: 1088 3 [P O :480; I V :308 3; IV Piggyback:300]  Out: 2375 [Urine:2375]  No intake/output data recorded  Lab Results and Cultures:   Lab Results   Component Value Date    WBC 7 98 03/20/2018    HGB 10 8 (L) 03/20/2018    HCT 33 9 (L) 03/20/2018    MCV 86 03/20/2018     03/20/2018     Lab Results   Component Value Date    GLUCOSE 221 (H) 03/20/2018    CALCIUM 8 8 03/20/2018     (L) 03/20/2018    K 4 2 03/20/2018    CO2 25 03/20/2018    CL 99 (L) 03/20/2018    BUN 12 03/20/2018    CREATININE 1 00 03/20/2018     Lab Results   Component Value Date    INR 1 06 03/14/2018    PROTIME 13 8 03/14/2018        Blood Culture:   Lab Results   Component Value Date    BLOODCX No Growth After 4 Days  03/15/2018    BLOODCX No Growth After 4 Days  03/15/2018   ,   Urinalysis:   Lab Results   Component Value Date    COLORU Yellow 01/02/2018    CLARITYU Clear 01/02/2018    SPECGRAV 1 025 01/02/2018    PHUR 6 0 01/02/2018    LEUKOCYTESUR (A) 01/02/2018     Elevated glucose may cause decreased leukocyte values   See urine microscopic for [facility] result/    NITRITE Negative 01/02/2018    PROTEINUA Trace (A) 01/02/2018    GLUCOSEU >=1000 (1%) (A) 01/02/2018    KETONESU Negative 01/02/2018    BILIRUBINUR Negative 01/02/2018    BLOODU Negative 01/02/2018   ,   Urine Culture: No results found for: URINECX,   Wound Culure: No results found for: WOUNDCULT    Medications:  Current Facility-Administered Medications Medication Dose Route Frequency    acetaminophen (TYLENOL) tablet 650 mg  650 mg Oral Q6H PRN    aspirin (ECOTRIN) EC tablet 325 mg  325 mg Oral Daily    atorvastatin (LIPITOR) tablet 40 mg  40 mg Oral Daily    calcium carbonate (TUMS) chewable tablet 500 mg  500 mg Oral Daily PRN    cefTRIAXone (ROCEPHIN) IVPB (premix) 1,000 mg  1,000 mg Intravenous Q24H    docusate sodium (COLACE) capsule 100 mg  100 mg Oral BID    enoxaparin (LOVENOX) subcutaneous injection 40 mg  40 mg Subcutaneous Daily    insulin glargine (LANTUS) subcutaneous injection 70 Units  70 Units Subcutaneous HS    insulin lispro (HumaLOG) 100 units/mL subcutaneous injection 1-5 Units  1-5 Units Subcutaneous TID AC    lactulose 20 g/30 mL oral solution 20 g  20 g Oral BID    lactulose retention enema 200 g  200 g Rectal BID PRN    LORazepam (ATIVAN) tablet 0 5 mg  0 5 mg Oral BID PRN    metoclopramide (REGLAN) tablet 5 mg  5 mg Oral Daily    metroNIDAZOLE (FLAGYL) IVPB (premix) 500 mg  500 mg Intravenous Q8H    morphine injection 2 mg  2 mg Intravenous Q4H PRN    ondansetron (ZOFRAN) injection 4 mg  4 mg Intravenous Q6H PRN       Imaging:  Abdominal aortogram with left lower extremity runoff (IR) 3/19/2018:  Imaging study reviewed with Dr Mirlande Phillips  Final report pending  No significant CFA, SFA disease  + mid L popliteal w/3 vessel runoff  Physical Exam:    General appearance: alert and oriented, in no acute distress  Neurologic: Grossly normal  Neck: no adenopathy, no carotid bruit, no JVD, supple, symmetrical, trachea midline and thyroid not enlarged, symmetric, no tenderness/mass/nodules  Lungs: clear to auscultation bilaterally  Heart: regular rate and rhythm, S1, S2 normal, no murmur, click, rub or gallop  Abdomen: soft, non-tender; bowel sounds normal; no masses,  no organomegaly and Nondistended    No abdominal bruits  Extremities: Trace edema bilateral lower extremities with chronic mild stasis changes of distal anterior tibial area  Left foot dressing intact  Please see Clinical images on chart  No purulent drainage  Bilateral lower extremities motor and sensory intact  Right groin clear, dry without hematoma, drainage, hemorrhage or pulsatile mass      Wound/Incision:    Left foot/heel      Pulse exam:  Radial: Right: 2+ Left[de-identified] 2+  Femoral: Right: 2+ Left: 2+  Popliteal: Right: non-palpable Left: non-palpable  DP: Right: non-palpable Left: non-palpable  PT: Right: non-palpable Left: non-palpable      Liz Becker PA-C  3/20/2018  [facility], [phone]

## 2018-03-20 NOTE — PHYSICAL THERAPY NOTE
PHYSICAL THERAPY NOTE          Patient Name: Mick Logan  Today's Date: 3/20/2018   Actual Pt care time: 4068-8090     03/20/18 1430   Pain Assessment   Pain Assessment No/denies pain   Restrictions/Precautions   Weight Bearing Precautions Per Order Yes   LLE Weight Bearing Per Order NWB   Other Precautions Fall Risk;Pain;Telemetry;Multiple lines;WBS   Cognition   Overall Cognitive Status WFL   Arousal/Participation Alert   Attention Within functional limits   Orientation Level Oriented to person;Oriented to place;Oriented to time   Memory Within functional limits   Following Commands Follows all commands and directions without difficulty   Subjective   Subjective willing to participate in PT    Bed Mobility   Additional Comments AN, Pt OOB in chair at time of PT session    Transfers   Sit to Stand 4  Minimal assistance   Additional items Assist x 2; Increased time required;Verbal cues   Stand to Sit 3  Moderate assistance   Additional items Assist x 2; Increased time required;Verbal cues   Additional Comments VC needed for hand placement and education on NWB status    Ambulation/Elevation   Gait pattern Excessively slow; Short stride; Inconsistent gerard   Gait Assistance 4  Minimal assist   Additional items Verbal cues; Assist x 1   Assistive Device Rolling walker   Distance 25ft   (limited by pain and fatigue )   Balance   Static Sitting Fair +   Static Standing Fair -   Ambulatory Poor +   Endurance Deficit   Endurance Deficit Yes   Endurance Deficit Description fatigue and pain    Activity Tolerance   Activity Tolerance Patient limited by fatigue;Patient limited by pain   Nurse Made Aware pt able to be seen per Ortzi Rivas RN   Exercises   Glute Sets Sitting;10 reps;AROM; Bilateral  (x 2 sets )   Hip Abduction Sitting;10 reps;AROM; Bilateral  (x 2 sets )   Knee AROM Long Arc Quad Sitting;10 reps;AROM; Bilateral  (x 2 sets )   Ankle Pumps Sitting;10 reps;AROM; Bilateral  (x 2 sets )   Assessment   Prognosis Fair   Problem List Decreased strength;Decreased range of motion;Decreased endurance; Impaired balance;Decreased mobility;Orthopedic restrictions;Decreased skin integrity   Assessment Pt resting comfortably in chair at time of PT session  Pt tolerated treatment well and was able to perform transfers and ambulation this session, however requires increased A due to new NWB status on LLE  Pt currently needs min/mod A x 2 for sit to stand transfers as well as increased VC and TC for hand placement and safety  Pt was educated on NWB status during session and demonstrated good compliance  Pt was able to tolerate ambulation, however is unable to tolerate ambulation as well as previous session due to NWBS  Pt able to ambulate 25ft with chair follow however slight LOB was noted, but pt was able to self correct; pts distances remain limited due to fatigue  Pt performed all therex seated OOB in chair this session without any new complaints  Slight VC and TC was needed to redirect attention and for proper form and pacing  Pt was assisted back into chair at conclusion of PT session with all needs within reach  Pt denies any further questions at this time  PT will continue to follow  DC recommendation when medically cleared is rehab due to NWB status, decreased functional mobility compared to baseline and decreased caregiver assistance at home  Barriers to Discharge Decreased caregiver support   Goals   Patient Goals to get better   STG Expiration Date 03/30/18   Short Term Goal #1 In 10 days pt will complete: 1) Bed mobility skills with S  2) Functional transfers with S  3) Ambulate 50' using least restrictive AD with S without LOB and stable vitals and maintaining NWB status  4) Stair training up/ down 2 step/s using rail/s with S  5) Improve balance grades to Good 6) Improve BLE strength by 1/2 grade   7) PT for ongoing pt and family education; DME needs and D/C planning to promote highest level of function in least restrictive environment  Plan   Treatment/Interventions Functional transfer training;LE strengthening/ROM; Elevations; Therapeutic exercise; Endurance training;Patient/family training;Equipment eval/education; Bed mobility;Gait training;Spoke to nursing;OT   Progress Slow progress, decreased activity tolerance   PT Frequency 5x/wk   Recommendation   Recommendation Short-term skilled PT   Equipment Recommended Walker  (RW)   PT - OK to Discharge Yes  (to rehab when medically cleared )   Shreya Grumbling, PT

## 2018-03-20 NOTE — PLAN OF CARE
Problem: PHYSICAL THERAPY ADULT  Goal: Performs mobility at highest level of function for planned discharge setting  See evaluation for individualized goals  Treatment/Interventions: Functional transfer training, LE strengthening/ROM, Elevations, Therapeutic exercise, Endurance training, Patient/family training, Equipment eval/education, Bed mobility, Gait training, Spoke to nursing, OT  Equipment Recommended: Nargis Castano, Other (Comment) (RW, Commode)       See flowsheet documentation for full assessment, interventions and recommendations  Outcome: Progressing  Prognosis: Fair  Problem List: Decreased strength, Decreased range of motion, Decreased endurance, Impaired balance, Decreased mobility, Orthopedic restrictions, Decreased skin integrity  Assessment: Pt resting comfortably in chair at time of PT session  Pt tolerated treatment well and was able to perform transfers and ambulation this session, however requires increased A due to new NWB status on LLE  Pt currently needs min/mod A x 2 for sit to stand transfers as well as increased VC and TC for hand placement and safety  Pt was educated on NWB status during session and demonstrated good compliance  Pt was able to tolerate ambulation, however is unable to tolerate ambulation as well as previous session due to NWBS  Pt able to ambulate 25ft with chair follow however slight LOB was noted, but pt was able to self correct; pts distances remain limited due to fatigue  Pt performed all therex seated OOB in chair this session without any new complaints  Slight VC and TC was needed to redirect attention and for proper form and pacing  Pt was assisted back into chair at conclusion of PT session with all needs within reach  Pt denies any further questions at this time  PT will continue to follow  DC recommendation when medically cleared is rehab due to NWB status, decreased functional mobility compared to baseline and decreased caregiver assistance at home     Barriers to Discharge: Decreased caregiver support  Barriers to Discharge Comments: VITOR, pt's spouse 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  Recommendation: Short-term skilled PT     PT - OK to Discharge: Yes (to rehab when medically cleared )    See flowsheet documentation for full assessment

## 2018-03-20 NOTE — PROGRESS NOTES
Progress Note - Podiatry  Radha Maldonado 61 y o  male MRN: [id number]  Unit/Bed#: E4 -01 Encounter: [id number]    Assessment/Plan:  1  Left foot plantar heel eschar with associated cellulitis secondary to diabetes mellitus with neuropathy, now s/p left heel excisional debridement at bedside (Date of procedure: 3/18/18) by Dr Edilberto Delcid   2  Left fourth digit DTI  3  DM type 2 with neuropathy-A1c: 9 4%  4  Peripheral arterial disease    Plan:  -afebrile, no leukocytosis, nontoxic in appearance   -left heel is stable with no crepitus, no fluctuance, no acute signs of infection: dressed with maxsorb, DSD   -DTI distal tip left 4th digit painted with betadine   -podiatry to continue to follow while patient in-house for monitoring and dressing changes   -prior radiograph left foot negative for osteomyelitis  -s/p agram with unsuccessful attempt to cross chronic occlusion left popliteal artery crossing knee joint ; 3 vessel runoff   -patient to continue with ceftriaxone/Flagyl per Internal Medicine  -patient nonweightbearing to the left lower extremity, prevalons in bed- educated on importance of use to the right lower extremity as well to prevent developing pressure wounds to this extremity; patient understanding of such; PT/OT/CM following for discharge planning   -medical management per primary team    Subjective/Objective   Chief Complaint:   Chief Complaint   Patient presents with    Wound Check     Reportsto have gangerous wound on L heal area; sent into ED by PCP  Subjective: 61 y o  y/o male was seen and evaluated at bedside  Feeling well  Reports he has no pain to his left foot  Blood pressure 115/66, pulse 68, temperature (!) 97 °F (36 1 °C), temperature source Tympanic, resp  rate 18, height 6' 1" (1 854 m), weight 117 kg (258 lb 9 6 oz), SpO2 98 %  ,Body mass index is 34 12 kg/m²      Invasive Devices     Peripheral Intravenous Line            Peripheral IV 03/18/18 Left Hand 2 days Physical Exam:   General: Alert, cooperative and no distress  Lungs: Non labored breathing  Abdomen: Soft, non-tender    Extremity: L heel wound (Friend 2) measuring approximately 6 x 5 x 0 5cm with base approximately 50% grey, 40% yellow, 10% pink, no active drainage however mild serosanguinous drainage evident upon dressing change, left distal 4th digit with DTI stable        L heel 3/20/18     Lab, Imaging and other studies:   CBC:   Lab Results   Component Value Date    WBC 7 98 03/20/2018    HGB 10 8 (L) 03/20/2018    HCT 33 9 (L) 03/20/2018    MCV 86 03/20/2018     03/20/2018    MCH 27 3 03/20/2018    MCHC 31 9 03/20/2018    RDW 13 1 03/20/2018    MPV 10 2 03/20/2018     VTE Pharmacologic Prophylaxis: Enoxaparin (Lovenox), ASA

## 2018-03-20 NOTE — ASSESSMENT & PLAN NOTE
Preserved LV function w/RWMA on TTE (moderate HK basal inferior wall)  --nuclear stress test tomorrow  --d/w cardiology

## 2018-03-20 NOTE — ASSESSMENT & PLAN NOTE
Non cardiac in nature,   Troponin 0 56, 0 32, 0 43 --  Mild elevation of troponin  Suspicion of CAD  Cardiology on board, following patient  Continue with aspirin, statin, morphine as needed for pain  Hold  beta blockers given patient's history of orthostatic hypotension and diabetic autonomic neuropathy  Further cardiac work up as per Cardiology

## 2018-03-21 ENCOUNTER — APPOINTMENT (INPATIENT)
Dept: NON INVASIVE DIAGNOSTICS | Facility: HOSPITAL | Age: 58
DRG: 264 | End: 2018-03-21
Payer: COMMERCIAL

## 2018-03-21 ENCOUNTER — APPOINTMENT (INPATIENT)
Dept: NUCLEAR MEDICINE | Facility: HOSPITAL | Age: 58
DRG: 264 | End: 2018-03-21
Payer: COMMERCIAL

## 2018-03-21 LAB
CHEST PAIN STATEMENT: NORMAL
GLUCOSE SERPL-MCNC: 174 MG/DL (ref 65–140)
GLUCOSE SERPL-MCNC: 198 MG/DL (ref 65–140)
GLUCOSE SERPL-MCNC: 220 MG/DL (ref 65–140)
GLUCOSE SERPL-MCNC: 283 MG/DL (ref 65–140)
GLUCOSE SERPL-MCNC: 345 MG/DL (ref 65–140)
MAX DIASTOLIC BP: 76 MMHG
MAX HEART RATE: 86 BPM
MAX PREDICTED HEART RATE: 162 BPM
MAX. SYSTOLIC BP: 153 MMHG
PROTOCOL NAME: NORMAL
REASON FOR TERMINATION: NORMAL
TARGET HR FORMULA: NORMAL
TIME IN EXERCISE PHASE: NORMAL

## 2018-03-21 PROCEDURE — A9502 TC99M TETROFOSMIN: HCPCS

## 2018-03-21 PROCEDURE — 93017 CV STRESS TEST TRACING ONLY: CPT

## 2018-03-21 PROCEDURE — 93971 EXTREMITY STUDY: CPT | Performed by: SURGERY

## 2018-03-21 PROCEDURE — 99232 SBSQ HOSP IP/OBS MODERATE 35: CPT | Performed by: PHYSICIAN ASSISTANT

## 2018-03-21 PROCEDURE — 82948 REAGENT STRIP/BLOOD GLUCOSE: CPT

## 2018-03-21 PROCEDURE — 78452 HT MUSCLE IMAGE SPECT MULT: CPT

## 2018-03-21 PROCEDURE — 99232 SBSQ HOSP IP/OBS MODERATE 35: CPT | Performed by: INTERNAL MEDICINE

## 2018-03-21 RX ORDER — SODIUM CHLORIDE 9 MG/ML
100 INJECTION, SOLUTION INTRAVENOUS ONCE
Status: DISCONTINUED | OUTPATIENT
Start: 2018-03-22 | End: 2018-03-21

## 2018-03-21 RX ORDER — SODIUM CHLORIDE 9 MG/ML
100 INJECTION, SOLUTION INTRAVENOUS CONTINUOUS
Status: DISCONTINUED | OUTPATIENT
Start: 2018-03-21 | End: 2018-03-22

## 2018-03-21 RX ORDER — CLOPIDOGREL BISULFATE 75 MG/1
75 TABLET ORAL DAILY
Status: DISCONTINUED | OUTPATIENT
Start: 2018-03-22 | End: 2018-03-22

## 2018-03-21 RX ORDER — LORAZEPAM 2 MG/ML
1 INJECTION INTRAMUSCULAR ONCE
Status: COMPLETED | OUTPATIENT
Start: 2018-03-21 | End: 2018-03-21

## 2018-03-21 RX ORDER — CLOPIDOGREL BISULFATE 75 MG/1
300 TABLET ORAL ONCE
Status: COMPLETED | OUTPATIENT
Start: 2018-03-21 | End: 2018-03-21

## 2018-03-21 RX ADMIN — ATORVASTATIN CALCIUM 40 MG: 40 TABLET, FILM COATED ORAL at 17:15

## 2018-03-21 RX ADMIN — LORAZEPAM 1 MG: 2 INJECTION INTRAMUSCULAR; INTRAVENOUS at 10:23

## 2018-03-21 RX ADMIN — ENOXAPARIN SODIUM 40 MG: 40 INJECTION SUBCUTANEOUS at 09:37

## 2018-03-21 RX ADMIN — INSULIN LISPRO 3 UNITS: 100 INJECTION, SOLUTION INTRAVENOUS; SUBCUTANEOUS at 18:16

## 2018-03-21 RX ADMIN — CLOPIDOGREL 300 MG: 75 TABLET, FILM COATED ORAL at 17:19

## 2018-03-21 RX ADMIN — REGADENOSON 0.4 MG: 0.08 INJECTION, SOLUTION INTRAVENOUS at 11:41

## 2018-03-21 RX ADMIN — INSULIN LISPRO 1 UNITS: 100 INJECTION, SOLUTION INTRAVENOUS; SUBCUTANEOUS at 09:37

## 2018-03-21 RX ADMIN — DOCUSATE SODIUM 100 MG: 100 CAPSULE, LIQUID FILLED ORAL at 17:15

## 2018-03-21 RX ADMIN — CEFTRIAXONE 1000 MG: 1 INJECTION, SOLUTION INTRAVENOUS at 18:12

## 2018-03-21 RX ADMIN — ASPIRIN 325 MG: 325 TABLET, COATED ORAL at 09:37

## 2018-03-21 RX ADMIN — INSULIN LISPRO 1 UNITS: 100 INJECTION, SOLUTION INTRAVENOUS; SUBCUTANEOUS at 14:09

## 2018-03-21 RX ADMIN — METOCLOPRAMIDE HYDROCHLORIDE 5 MG: 10 TABLET ORAL at 09:37

## 2018-03-21 RX ADMIN — METRONIDAZOLE 500 MG: 500 INJECTION, SOLUTION INTRAVENOUS at 17:15

## 2018-03-21 RX ADMIN — METRONIDAZOLE 500 MG: 500 INJECTION, SOLUTION INTRAVENOUS at 09:36

## 2018-03-21 RX ADMIN — INSULIN GLARGINE 70 UNITS: 100 INJECTION, SOLUTION SUBCUTANEOUS at 22:01

## 2018-03-21 RX ADMIN — METRONIDAZOLE 500 MG: 500 INJECTION, SOLUTION INTRAVENOUS at 00:58

## 2018-03-21 RX ADMIN — DOCUSATE SODIUM 100 MG: 100 CAPSULE, LIQUID FILLED ORAL at 09:37

## 2018-03-21 NOTE — PROGRESS NOTES
Progress Note - Carolyn Blain 1960, 61 y o  male MRN: [id number]    Unit/Bed#: E4 -01 Encounter: [id number]    Primary Care Provider: Lauren Toledo DO   Date and time admitted to hospital: 3/14/2018  2:14 PM    Non-healing wound of left heel   Assessment & Plan     Abdominal aortogram with left lower extremity runoff and possible endovascular intervention by IR on 3/19/2018, with unsuccessful attempt to cross chronic occlusion left popliteal artery crossing knee joint ; 3 vessel runoff  Continue as per vascular surgery  Continue with empiric antibiotics, day 8  (IV ceftriaxone plus Flagyl)  Patient has an ongoing left heel infection for the past 1 month which is progressively worsened the past 2 weeks with eschar and gangrenous formation  DVT has been ruled out  Patient also had lower extremity arterial Dopplers on 03/06/18 which revealed diffuse atherosclerotic disease noted throughout the femoral popliteal arteries suggested of tibioperoneal disease  Concern of gangrenous formation of the heel and also the tips of the left foot  Xray is negative for any osteomyelitis, , CRP 79 5  Podiatry and vascular sx input appreciated           PAD (peripheral artery disease) ([facility] 75 )   Assessment & Plan     See above  Continue aspirin, Lipitor           Atypical chest pain   Assessment & Plan     Non cardiac in nature,   Troponin 0 56, 0 32, 0 43 --  Mild elevation of troponin   Suspicion of CAD  Stress test planned for today  Continue with aspirin, statin, morphine as needed for pain  Hold  beta blockers given patient's history of orthostatic hypotension and diabetic autonomic neuropathy        Diabetic autonomic neuropathy associated with type 2 diabetes mellitus ([facility] 75 )   Assessment & Plan     Continue with Lantus 70 units BID and sliding scale for coverage  HbA1c 9 4    Will hold beta-blockers, midodrine           Bilateral lower extremity edema   Assessment & Plan     2D echocardiogram from 03/15 reviewed  Unfortunately because of orthostatic hypotension no specific treatment for now  Cardiology to decide on timing of stress test based on angiogram findings           Diabetic gastroparesis ([facility] 75 )   Assessment & Plan     Stringent blood sugar control is mandatory  Continued Reglan once daily        Morbid obesity ([facility] 75 )   Assessment & Plan     Lifestyle modifications       Diabetic neuropathy, type II diabetes mellitus ([facility] )   Assessment & Plan     Continue with Lantus and sliding scale insulin  Blood sugars reasonable    ___________________________________________________________________    SUBJECTIVE:   Patient seen and examined  He appears comfortable  Awaiting stress test for today  Wife updated of the plans  He offers no complaints today  OBJECTIVE:     Vitals:   HR:  [67-77] 77  Resp:  [16-18] 18  BP: (107-142)/(63-93) 142/79  SpO2:  [97 %-99 %] 97 %  Temp (24hrs), Av 7 °F (36 5 °C), Min:97 2 °F (36 2 °C), Max:98 1 °F (36 7 °C)  Current: Temperature: (!) 97 2 °F (36 2 °C)    Intake/Output Summary (Last 24 hours) at 18 0943  Last data filed at 18 0839   Gross per 24 hour   Intake           936 66 ml   Output             1900 ml   Net          -963 34 ml       Physical Exam:   GENERAL: AAO x 3  Morbid obesity  HEENT: atraumatic, normocephalic  Oral mucosa moist, no icterus, pallor  PERRLA +  Neck supple, no JVD, no lymphadenopathy, no thryomegaly  CHEST: B/L breath sounds heard, occasional wheezing  CVS: S1, S2  No cyanosis/clubbing or edema  ABDOMEN: Soft/obese/NT/BS heard  NEUROLOGICAL: CN II -XI grossly intact  No focal motor or sensory deficits  No signs of meningeal irritation or cerebellar dysfunction  EXTREMITIES: Bilateral lower extremities edema with chronic mild stasis changes of distal anterior tibial area  Left foot dressing intact  No purulent drainage  Bilateral lower extremities motor and sensory intact    Right groin clear, dry without hematoma, drainage, hemorrhage or pulsatile mass        LABS:  Results for Mckenzie Bazan (MRN [id number]) as of 3/20/2018 09:36   3/20/2018 04:55   eGFR 83   Sodium 135 (L)   Potassium 4 2   Chloride 99 (L)   CO2 25   Anion Gap 11   BUN 12   Creatinine 1 00   Glucose 221 (H)   Calcium 8 8   WBC 7 98   RBC 3 95   Hemoglobin 10 8 (L)   Hematocrit 33 9 (L)   MCV 86   MCH 27 3   MCHC 31 9   RDW 13 1   Platelets 205   MPV 11 2     Scheduled Meds:    Current Facility-Administered Medications:  acetaminophen 650 mg Oral Q6H PRN Jaron Sanchez MD    aspirin 325 mg Oral Daily Jaron Sanchez MD    atorvastatin 40 mg Oral Daily Jaron Sanchez MD    calcium carbonate 500 mg Oral Daily PRN CARLTON Grajeda    cefTRIAXone 1,000 mg Intravenous Q24H Jaron Sanchez MD Last Rate: Stopped (03/20/18 1901)   docusate sodium 100 mg Oral BID Priti Borrero MD    enoxaparin 40 mg Subcutaneous Daily Jaron Sanchez MD    insulin glargine 70 Units Subcutaneous HS Jaron Sanchez MD    insulin lispro 1-5 Units Subcutaneous TID AC Jaron Sanchez MD    lactulose 20 g Oral BID Jaron Sanchez MD    lactulose 200 g Rectal BID PRN Jaron Sanchez MD    LORazepam 0 5 mg Oral BID PRN Jaron Sanchez MD    metoclopramide 5 mg Oral Daily Jaron Sanchez MD    metroNIDAZOLE 500 mg Intravenous Q8H Jaron Sanchez MD Last Rate: 500 mg (03/21/18 0936)   morphine injection 2 mg Intravenous Q4H PRN Jaron Sanchez MD    ondansetron 4 mg Intravenous Q6H PRN Jaron Sanchez MD        Continuous Infusions:       PRN Meds:    acetaminophen    calcium carbonate    lactulose    LORazepam    morphine injection    ondansetron      VTE Prophylaxis:Enoxaparin  DISPOSITION: Pending clinical stability  Expect a prolonged hospitalization  Time Spent for Care: 20 minutes   More than 50% of total time spent on counseling and coordination of care as described above  Family will be updated      Wendy Alcaraz MD  HOSPITALIST SERVICES  3/21/2018    PLEASE NOTE:  This encounter was completed utilizing the M- Modal/Fluency Direct Speech Voice Recognition Software  Grammatical errors, random word insertions, pronoun errors and incomplete sentences are occasional consequences of the system due to software limitations, ambient noise and hardware issues  These may be missed by proof reading prior to affixing electronic signature  Any questions or concerns about the content, text or information contained within the body of this dictation should be directly addressed to the physician for clarification   Please do not hesitate to call me directly if you have any any questions or concerns

## 2018-03-21 NOTE — ASSESSMENT & PLAN NOTE
PAD w/LLE tissue loss (eschar plantar aspect of left heel), s/p diagnostic LLE angiogram, unsuccessful recanalization L popliteal  (IR) 3/19/2018  --recommend formal surgical revascularization w/L fem-BK popliteal bypass grafting  --+adequate LLE and right thigh conduit on vein mapping  --+RWMA on TTE   Nuclear stress test in progress  --continue aspirin and statin  --continue [facility] per podiatry  --continue antibiotics per primary service  --timing of LLE bypass surgery based on cardiology clearance  --d/w Dr Ha Au Doctor

## 2018-03-21 NOTE — ASSESSMENT & PLAN NOTE
Preserved LV function w/RWMA on TTE (moderate HK basal inferior wall)  --nuclear stress test in progress  --d/w cardiology

## 2018-03-21 NOTE — PROGRESS NOTES
Progress Note - Yakov Kitcehn 1960, 61 y o  male MRN: [id number]    Unit/Bed#: E4 -01 Encounter: [id number]    Primary Care Provider: Neeta Mitchell DO   Date and time admitted to hospital: 3/14/2018  2:14 PM        PAD (peripheral artery disease) ([facility] )   Assessment & Plan    PAD w/LLE tissue loss (eschar plantar aspect of left heel), s/p diagnostic LLE angiogram, unsuccessful recanalization L popliteal  (IR) 3/19/2018  --recommend formal surgical revascularization w/L fem-BK popliteal bypass grafting  --+adequate LLE and right thigh conduit on vein mapping  --+RWMA on TTE  Nuclear stress test in progress  --continue aspirin and statin  --continue [address] per podiatry  --continue antibiotics per primary service  --timing of LLE bypass surgery based on cardiology clearance  --d/w Dr Dye Proper Doctor            * Non-healing wound of left heel   Assessment & Plan    L plantar heel unstageable eschar, s/p bedside debridement 3/18/18  --L foot x-ray negative for OM  --continue [address] per Podiatry  --continue Rocephin and Flagyl per primary service  --s/p unsuccessful recannulization attempt mid L popliteal occlusion 3/19/18  --will require surgical revascularization LLE after cardiology clearance complete          Diabetic neuropathy, type II diabetes mellitus ([facility] )   Assessment & Plan    Poorly controlled diabetic, with recent HgbA1c of 9 4  --optimize BS control for optimization of wound healing and prevention disease  --continue current medical regimen per primary service        Atypical chest pain   Assessment & Plan    Preserved LV function w/RWMA on TTE (moderate HK basal inferior wall)  --nuclear stress test in progress  --d/w cardiology        Hyperlipidemia   Assessment & Plan    --Continue statin therapy        Benign essential HTN   Assessment & Plan    --continue current medical regimen per primary service            Patient at stress test presently  Chart reviewed and discussed with nursing    No new events overnight  Vein mapping reviewed which demonstrates adequate conduit left lower extremity and right thigh  No greater saphenous vein visualized right lower leg  Will reassess in a tee Gonzalez Awaiting cardiac clearance prior to proceeding with scheduling left lower extremity bypass graft            Malathi Simmons PA-C  Vascular Surgery  [facility], [phone]

## 2018-03-21 NOTE — PLAN OF CARE
Problem: Prexisting or High Potential for Compromised Skin Integrity  Goal: Skin integrity is maintained or improved  INTERVENTIONS:  - Identify patients at risk for skin breakdown  - Assess and monitor skin integrity  - Assess and monitor nutrition and hydration status  - Monitor labs (i e  albumin)  - Assess for incontinence   - Turn and reposition patient  - Assist with mobility/ambulation  - Relieve pressure over bony prominences  - Avoid friction and shearing  - Provide appropriate hygiene as needed including keeping skin clean and dry  - Evaluate need for skin moisturizer/barrier cream  - Collaborate with interdisciplinary team (i e  Nutrition, Rehabilitation, etc )   - Patient/family teaching   Outcome: Progressing      Problem: Potential for Falls  Goal: Patient will remain free of falls  INTERVENTIONS:  - Assess patient frequently for physical needs  -  Identify cognitive and physical deficits and behaviors that affect risk of falls    -  [place] fall precautions as indicated by assessment   - Educate patient/family on patient safety including physical limitations  - Instruct patient to call for assistance with activity based on assessment  - Modify environment to reduce risk of injury  - Consider OT/PT consult to assist with strengthening/mobility   Outcome: Progressing      Problem: INFECTION - ADULT  Goal: Absence or prevention of progression during hospitalization  INTERVENTIONS:  - Assess and monitor for signs and symptoms of infection  - Monitor lab/diagnostic results  - Monitor all insertion sites, i e  indwelling lines, tubes, and drains  - Monitor endotracheal (as able) and nasal secretions for changes in amount and color  - [place] appropriate cooling/warming therapies per order  - Administer medications as ordered  - Instruct and encourage patient and family to use good hand hygiene technique  - Identify and instruct in appropriate isolation precautions for identified infection/condition Outcome: Progressing    Goal: Absence of fever/infection during neutropenic period  INTERVENTIONS:  - Monitor WBC  - Implement neutropenic guidelines   Outcome: Not Progressing      Problem: SAFETY ADULT  Goal: Maintain or return to baseline ADL function  INTERVENTIONS:  -  Assess patient's ability to carry out ADLs; assess patient's baseline for ADL function and identify physical deficits which impact ability to perform ADLs (bathing, care of mouth/teeth, toileting, grooming, dressing, etc )  - Assess/evaluate cause of self-care deficits   - Assess range of motion  - Assess patient's mobility; develop plan if impaired  - Assess patient's need for assistive devices and provide as appropriate  - Encourage maximum independence but intervene and supervise when necessary  ¯ Involve family in performance of ADLs  ¯ Assess for home care needs following discharge   ¯ Request OT consult to assist with ADL evaluation and planning for discharge  ¯ Provide patient education as appropriate   Outcome: Progressing    Goal: Maintain or return mobility status to optimal level  INTERVENTIONS:  - Assess patient's baseline mobility status (ambulation, transfers, stairs, etc )    - Identify cognitive and physical deficits and behaviors that affect mobility  - Identify mobility aids required to assist with transfers and/or ambulation (gait belt, sit-to-stand, lift, walker, cane, etc )  - [employer] fall precautions as indicated by assessment  - Record patient progress and toleration of activity level on Mobility SBAR; progress patient to next Phase/Stage  - Instruct patient to call for assistance with activity based on assessment  - Request Rehabilitation consult to assist with strengthening/weightbearing, etc    Outcome: Progressing      Problem: DISCHARGE PLANNING  Goal: Discharge to home or other facility with appropriate resources  INTERVENTIONS:  - Identify barriers to discharge w/patient and caregiver  - Arrange for needed discharge resources and transportation as appropriate  - Identify discharge learning needs (meds, wound care, etc )  - Arrange for interpretive services to assist at discharge as needed  - Refer to Case Management Department for coordinating discharge planning if the patient needs post-hospital services based on physician/advanced practitioner order or complex needs related to functional status, cognitive ability, or social support system   Outcome: Progressing      Problem: Knowledge Deficit  Goal: Patient/family/caregiver demonstrates understanding of disease process, treatment plan, medications, and discharge instructions  Complete learning assessment and assess knowledge base    Interventions:  - Provide teaching at level of understanding  - Provide teaching via preferred learning methods   Outcome: Progressing      Problem: SKIN/TISSUE INTEGRITY - ADULT  Goal: Skin integrity remains intact  INTERVENTIONS  - Identify patients at risk for skin breakdown  - Assess and monitor skin integrity  - Assess and monitor nutrition and hydration status  - Monitor labs (i e  albumin)  - Assess for incontinence   - Turn and reposition patient  - Assist with mobility/ambulation  - Relieve pressure over bony prominences  - Avoid friction and shearing  - Provide appropriate hygiene as needed including keeping skin clean and dry  - Evaluate need for skin moisturizer/barrier cream  - Collaborate with interdisciplinary team (i e  Nutrition, Rehabilitation, etc )   - Patient/family teaching   Outcome: Progressing    Goal: Incision(s), wounds(s) or drain site(s) healing without S/S of infection  INTERVENTIONS  - Assess and document risk factors for skin impairment   - Assess and document dressing, incision, wound bed, drain sites and surrounding tissue  - Initiate Nutrition services consult and/or wound management as needed   Outcome: Progressing      Problem: Nutrition/Hydration-ADULT  Goal: Nutrient/Hydration intake appropriate for improving, restoring or maintaining nutritional needs  Monitor and assess patient's nutrition/hydration status for malnutrition (ex- brittle hair, bruises, dry skin, pale skin and conjunctiva, muscle wasting, smooth red tongue, and disorientation)  Collaborate with interdisciplinary team and initiate plan and interventions as ordered  Monitor patient's weight and dietary intake as ordered or per policy  Utilize nutrition screening tool and intervene per policy  Determine patient's food preferences and provide high-protein, high-caloric foods as appropriate       INTERVENTIONS:  - Monitor oral intake, urinary output, labs, and treatment plans  - Assess nutrition and hydration status and recommend course of action  - Evaluate amount of meals eaten  - Assist patient with eating if necessary   - Allow adequate time for meals  - Recommend/ encourage appropriate diets, oral nutritional supplements, and vitamin/mineral supplements  - Order, calculate, and assess calorie counts as needed  - Recommend, monitor, and adjust tube feedings and TPN/PPN based on assessed needs  - Assess need for intravenous fluids  - Provide specific nutrition/hydration education as appropriate  - Include patient/family/caregiver in decisions related to nutrition   Outcome: Progressing      Problem: DISCHARGE PLANNING - CARE MANAGEMENT  Goal: Discharge to post-acute care or home with appropriate resources  INTERVENTIONS:  - Conduct assessment to determine patient/family and health care team treatment goals, and need for post-acute services based on payer coverage, community resources, and patient preferences, and barriers to discharge  - Address psychosocial, clinical, and financial barriers to discharge as identified in assessment in conjunction with the patient/family and health care team  - Arrange appropriate level of post-acute services according to patients   needs and preference and payer coverage in collaboration with the physician and health care team  - Communicate with and update the patient/family, physician, and health care team regarding progress on the discharge plan  - Arrange appropriate transportation to post-acute venues   Outcome: Progressing

## 2018-03-21 NOTE — PROGRESS NOTES
NONBILLABLE VISIT    Myoview scan reviewed  Appears to have inferior lateral infarct with some superimposed ischemia  I am somewhat concerned about patient's presentation with hypertension and rather significant ST changes and mild troponin spill  Made a strong recommendation for cardiac catheterization to visualize his coronary circulation  He may have a totally occluded vessel which is collateralized and can be managed through vascular surgery however if he has a subtotal occlusion or disease more extensive than what we define today, I think it behooves us to try to define the disease and hopefully improve his chances of a revascularization  The risk of cardiac catheterization were explained to the patient including death, myocardial infarction, stroke, vascular injury, bleeding, thrombosis, pseudoaneurysm, allergy, infection and neural injury  We also discussed the potential for catheter based intervention including a greater than 90% chance of acute success, restenosis over a longer time frame and 1% chance of acute closure with resultant myocardial infarction and need for urgent surgery at increased risk  The patient consented to this  Formal consent will be obtained by the  in the cardiac catheterization lab

## 2018-03-22 ENCOUNTER — APPOINTMENT (INPATIENT)
Dept: NON INVASIVE DIAGNOSTICS | Facility: HOSPITAL | Age: 58
DRG: 264 | End: 2018-03-22
Payer: COMMERCIAL

## 2018-03-22 PROBLEM — IMO0002 UNCONTROLLED DIABETES MELLITUS TYPE 2 WITH ATHEROSCLEROSIS OF ARTERIES OF EXTREMITIES: Status: ACTIVE | Noted: 2017-12-28

## 2018-03-22 LAB
ANION GAP SERPL CALCULATED.3IONS-SCNC: 9 MMOL/L (ref 4–13)
BUN SERPL-MCNC: 11 MG/DL (ref 5–25)
CALCIUM SERPL-MCNC: 8.4 MG/DL (ref 8.3–10.1)
CHLORIDE SERPL-SCNC: 100 MMOL/L (ref 100–108)
CO2 SERPL-SCNC: 26 MMOL/L (ref 21–32)
CREAT SERPL-MCNC: 0.9 MG/DL (ref 0.6–1.3)
ERYTHROCYTE [DISTWIDTH] IN BLOOD BY AUTOMATED COUNT: 13.3 % (ref 11.6–15.1)
GFR SERPL CREATININE-BSD FRML MDRD: 94 ML/MIN/1.73SQ M
GLUCOSE SERPL-MCNC: 183 MG/DL (ref 65–140)
GLUCOSE SERPL-MCNC: 223 MG/DL (ref 65–140)
GLUCOSE SERPL-MCNC: 232 MG/DL (ref 65–140)
HCT VFR BLD AUTO: 35 % (ref 36.5–49.3)
HGB BLD-MCNC: 11.4 G/DL (ref 12–17)
MCH RBC QN AUTO: 27.9 PG (ref 26.8–34.3)
MCHC RBC AUTO-ENTMCNC: 32.6 G/DL (ref 31.4–37.4)
MCV RBC AUTO: 86 FL (ref 82–98)
PLATELET # BLD AUTO: 271 THOUSANDS/UL (ref 149–390)
PMV BLD AUTO: 9.8 FL (ref 8.9–12.7)
POTASSIUM SERPL-SCNC: 4.1 MMOL/L (ref 3.5–5.3)
RBC # BLD AUTO: 4.09 MILLION/UL (ref 3.88–5.62)
SODIUM SERPL-SCNC: 135 MMOL/L (ref 136–145)
WBC # BLD AUTO: 7.31 THOUSAND/UL (ref 4.31–10.16)

## 2018-03-22 PROCEDURE — 93458 L HRT ARTERY/VENTRICLE ANGIO: CPT | Performed by: INTERNAL MEDICINE

## 2018-03-22 PROCEDURE — 4A023N7 MEASUREMENT OF CARDIAC SAMPLING AND PRESSURE, LEFT HEART, PERCUTANEOUS APPROACH: ICD-10-PCS | Performed by: INTERNAL MEDICINE

## 2018-03-22 PROCEDURE — B2111ZZ FLUOROSCOPY OF MULTIPLE CORONARY ARTERIES USING LOW OSMOLAR CONTRAST: ICD-10-PCS | Performed by: INTERNAL MEDICINE

## 2018-03-22 PROCEDURE — 80048 BASIC METABOLIC PNL TOTAL CA: CPT | Performed by: INTERNAL MEDICINE

## 2018-03-22 PROCEDURE — 85027 COMPLETE CBC AUTOMATED: CPT | Performed by: INTERNAL MEDICINE

## 2018-03-22 PROCEDURE — 93458 L HRT ARTERY/VENTRICLE ANGIO: CPT | Performed by: PHYSICIAN ASSISTANT

## 2018-03-22 PROCEDURE — C1894 INTRO/SHEATH, NON-LASER: HCPCS | Performed by: PHYSICIAN ASSISTANT

## 2018-03-22 PROCEDURE — 99232 SBSQ HOSP IP/OBS MODERATE 35: CPT | Performed by: PHYSICIAN ASSISTANT

## 2018-03-22 PROCEDURE — 99152 MOD SED SAME PHYS/QHP 5/>YRS: CPT | Performed by: PHYSICIAN ASSISTANT

## 2018-03-22 PROCEDURE — 99232 SBSQ HOSP IP/OBS MODERATE 35: CPT | Performed by: INTERNAL MEDICINE

## 2018-03-22 PROCEDURE — C1769 GUIDE WIRE: HCPCS | Performed by: PHYSICIAN ASSISTANT

## 2018-03-22 PROCEDURE — 99152 MOD SED SAME PHYS/QHP 5/>YRS: CPT | Performed by: INTERNAL MEDICINE

## 2018-03-22 PROCEDURE — 99153 MOD SED SAME PHYS/QHP EA: CPT | Performed by: PHYSICIAN ASSISTANT

## 2018-03-22 PROCEDURE — 82948 REAGENT STRIP/BLOOD GLUCOSE: CPT

## 2018-03-22 PROCEDURE — B2151ZZ FLUOROSCOPY OF LEFT HEART USING LOW OSMOLAR CONTRAST: ICD-10-PCS | Performed by: INTERNAL MEDICINE

## 2018-03-22 RX ORDER — INSULIN GLARGINE 100 [IU]/ML
80 INJECTION, SOLUTION SUBCUTANEOUS
Status: DISCONTINUED | OUTPATIENT
Start: 2018-03-22 | End: 2018-03-23 | Stop reason: HOSPADM

## 2018-03-22 RX ORDER — LIDOCAINE HYDROCHLORIDE 10 MG/ML
INJECTION, SOLUTION INFILTRATION; PERINEURAL CODE/TRAUMA/SEDATION MEDICATION
Status: COMPLETED | OUTPATIENT
Start: 2018-03-22 | End: 2018-03-22

## 2018-03-22 RX ORDER — VERAPAMIL HYDROCHLORIDE 2.5 MG/ML
INJECTION, SOLUTION INTRAVENOUS CODE/TRAUMA/SEDATION MEDICATION
Status: COMPLETED | OUTPATIENT
Start: 2018-03-22 | End: 2018-03-22

## 2018-03-22 RX ORDER — NITROGLYCERIN 20 MG/100ML
INJECTION INTRAVENOUS CODE/TRAUMA/SEDATION MEDICATION
Status: COMPLETED | OUTPATIENT
Start: 2018-03-22 | End: 2018-03-22

## 2018-03-22 RX ORDER — SODIUM CHLORIDE 9 MG/ML
100 INJECTION, SOLUTION INTRAVENOUS CONTINUOUS
Status: DISCONTINUED | OUTPATIENT
Start: 2018-03-22 | End: 2018-03-22

## 2018-03-22 RX ORDER — HEPARIN SODIUM 1000 [USP'U]/ML
INJECTION, SOLUTION INTRAVENOUS; SUBCUTANEOUS CODE/TRAUMA/SEDATION MEDICATION
Status: COMPLETED | OUTPATIENT
Start: 2018-03-22 | End: 2018-03-22

## 2018-03-22 RX ORDER — SODIUM CHLORIDE 9 MG/ML
100 INJECTION, SOLUTION INTRAVENOUS CONTINUOUS
Status: DISCONTINUED | OUTPATIENT
Start: 2018-03-22 | End: 2018-03-23 | Stop reason: HOSPADM

## 2018-03-22 RX ORDER — FENTANYL CITRATE 50 UG/ML
INJECTION, SOLUTION INTRAMUSCULAR; INTRAVENOUS CODE/TRAUMA/SEDATION MEDICATION
Status: COMPLETED | OUTPATIENT
Start: 2018-03-22 | End: 2018-03-22

## 2018-03-22 RX ORDER — MIDAZOLAM HYDROCHLORIDE 1 MG/ML
INJECTION INTRAMUSCULAR; INTRAVENOUS CODE/TRAUMA/SEDATION MEDICATION
Status: COMPLETED | OUTPATIENT
Start: 2018-03-22 | End: 2018-03-22

## 2018-03-22 RX ADMIN — FENTANYL CITRATE 50 MCG: 50 INJECTION, SOLUTION INTRAMUSCULAR; INTRAVENOUS at 09:54

## 2018-03-22 RX ADMIN — COLLAGENASE SANTYL: 250 OINTMENT TOPICAL at 17:00

## 2018-03-22 RX ADMIN — IOHEXOL 101 ML: 350 INJECTION, SOLUTION INTRAVENOUS at 10:22

## 2018-03-22 RX ADMIN — DOCUSATE SODIUM 100 MG: 100 CAPSULE, LIQUID FILLED ORAL at 08:07

## 2018-03-22 RX ADMIN — VERAPAMIL HYDROCHLORIDE 2.5 MG: 2.5 INJECTION, SOLUTION INTRAVENOUS at 10:10

## 2018-03-22 RX ADMIN — MIDAZOLAM 2 MG: 1 INJECTION INTRAMUSCULAR; INTRAVENOUS at 09:55

## 2018-03-22 RX ADMIN — SODIUM CHLORIDE 100 ML/HR: 0.9 INJECTION, SOLUTION INTRAVENOUS at 15:58

## 2018-03-22 RX ADMIN — ASPIRIN 325 MG: 325 TABLET, COATED ORAL at 08:07

## 2018-03-22 RX ADMIN — DOCUSATE SODIUM 100 MG: 100 CAPSULE, LIQUID FILLED ORAL at 18:20

## 2018-03-22 RX ADMIN — NITROGLYCERIN 200 MCG: 20 INJECTION INTRAVENOUS at 10:09

## 2018-03-22 RX ADMIN — METOCLOPRAMIDE HYDROCHLORIDE 5 MG: 10 TABLET ORAL at 08:07

## 2018-03-22 RX ADMIN — INSULIN LISPRO 1 UNITS: 100 INJECTION, SOLUTION INTRAVENOUS; SUBCUTANEOUS at 14:00

## 2018-03-22 RX ADMIN — SODIUM CHLORIDE 100 ML/HR: 0.9 INJECTION, SOLUTION INTRAVENOUS at 10:45

## 2018-03-22 RX ADMIN — LIDOCAINE HYDROCHLORIDE 2 ML: 10 INJECTION, SOLUTION INFILTRATION; PERINEURAL at 10:06

## 2018-03-22 RX ADMIN — METRONIDAZOLE 500 MG: 500 INJECTION, SOLUTION INTRAVENOUS at 16:41

## 2018-03-22 RX ADMIN — CEFTRIAXONE 1000 MG: 1 INJECTION, SOLUTION INTRAVENOUS at 18:18

## 2018-03-22 RX ADMIN — HEPARIN SODIUM 4000 UNITS: 1000 INJECTION INTRAVENOUS; SUBCUTANEOUS at 10:10

## 2018-03-22 RX ADMIN — SODIUM CHLORIDE 100 ML/HR: 0.9 INJECTION, SOLUTION INTRAVENOUS at 06:15

## 2018-03-22 RX ADMIN — INSULIN GLARGINE 80 UNITS: 100 INJECTION, SOLUTION SUBCUTANEOUS at 21:35

## 2018-03-22 RX ADMIN — ATORVASTATIN CALCIUM 40 MG: 40 TABLET, FILM COATED ORAL at 18:20

## 2018-03-22 RX ADMIN — CLOPIDOGREL 75 MG: 75 TABLET, FILM COATED ORAL at 08:06

## 2018-03-22 RX ADMIN — METRONIDAZOLE 500 MG: 500 INJECTION, SOLUTION INTRAVENOUS at 08:33

## 2018-03-22 RX ADMIN — METRONIDAZOLE 500 MG: 500 INJECTION, SOLUTION INTRAVENOUS at 01:28

## 2018-03-22 NOTE — ASSESSMENT & PLAN NOTE
2D echocardiogram from 03/15 reviewed  Unfortunately because of orthostatic hypotension no specific treatment for now

## 2018-03-22 NOTE — ASSESSMENT & PLAN NOTE
Continues to have orthostatic hypertension secondary to diabetic autonomic neuropathy  Difficult to manage  Currently not on any antihypertensives due to severe fluctuations in blood pressure

## 2018-03-22 NOTE — ASSESSMENT & PLAN NOTE
Poorly controlled DM, with recent HgbA1c of 9 4  --optimize BS control for optimization of wound healing and prevention disease  --BS continue to be poorly controlled  --adjust current medical regimen per primary service

## 2018-03-22 NOTE — ASSESSMENT & PLAN NOTE
PAD w/LLE tissue loss (eschar plantar aspect of left heel), s/p diagnostic LLE angiogram, unsuccessful recanalization L popliteal  (IR) 3/19/2018  --recommend formal surgical revascularization w/L femoral-BK popliteal bypass grafting  --+adequate LLE and right thigh conduit on vein mapping  --+abnormal nuclear stress w/inferolateral infarct w/superimposed ischemia, cardiac cath this am  --continue aspirin and statin  --continue [address] per podiatry  --continue antibiotics per primary service  --timing of LLE bypass surgery pending formal cardiology clearance and results of cardiac catheterization today  --will d/w Dr Sal Law

## 2018-03-22 NOTE — PROGRESS NOTES
Progress Note - Shay Duron 1960, 61 y o  male MRN: [id number]    Unit/Bed#: E4 -01 Encounter: [id number]    Primary Care Provider: Trevon Montero DO   Date and time admitted to hospital: 3/14/2018  2:14 PM        Benign essential HTN   Assessment & Plan    Continues to have orthostatic hypertension secondary to diabetic autonomic neuropathy  Difficult to manage  Currently not on any antihypertensives due to severe fluctuations in blood pressure  * Non-healing wound of left heel   Assessment & Plan    Vascular surgery recommends formal surgical revascularization w/L femoral-BK popliteal bypass grafting  Abdominal aortogram with left lower extremity runoff and possible endovascular intervention by IR on 3/19/2018, with unsuccessful attempt to cross chronic occlusion left popliteal artery crossing knee joint ; 3 vessel runoff  Continue with empiric antibiotics, day 8  (IV ceftriaxone plus Flagyl)  DVT has been ruled out  Patient also had lower extremity arterial Dopplers on 03/06/18 which revealed diffuse atherosclerotic disease noted throughout the femoral popliteal arteries suggested of tibioperoneal disease  Xray is negative for any osteomyelitis, , CRP 79 5  Podiatry and vascular sx input on board  PAD (peripheral artery disease) ([facility] )   Assessment & Plan    See above  Continue aspirin, Lipitor  Atypical chest pain   Assessment & Plan    S/P stress test in 03/21: Appears to have inferior lateral infarct with some superimposed ischemia  Cardiology recommends cardiac catheterization  Troponin 0 56, 0 32, 0 43 --  Mild elevation of troponin  Suspicion of CAD  Cardiology on board  Continue with aspirin, statin, morphine as needed for pain  Hold  beta blockers given patient's history of orthostatic hypotension and diabetic autonomic neuropathy  Further cardiac work up as per Cardiology          Diabetic autonomic neuropathy associated with type 2 diabetes mellitus [facility])   Assessment & Plan    Continue with Lantus 70 units BID and sliding scale for coverage  HbA1c 9 4  Will hold beta-blockers, midodrine  Bilateral lower extremity edema   Assessment & Plan    2D echocardiogram from 03/15 reviewed  Unfortunately because of orthostatic hypotension no specific treatment for now  Diabetic gastroparesis ([facility] )   Assessment & Plan    Stringent blood sugar control is mandatory  Continued Reglan once daily  Morbid obesity ([facility] )   Assessment & Plan    Lifestyle modifications        Diabetic neuropathy, type II diabetes mellitus ([facility] )   Assessment & Plan    Continue with Lantus and sliding scale insulin  Blood sugars reasonable  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 ([facility] )   Assessment & Plan    Blood sugars have been erratic  Will consult Endocrinology for better blood sugar management         ______________________________________________________________________    SUBJECTIVE:    Patient seen and examined  I had a long discussion with the patient last evening -- patient's is willing to undergo cardiac catheterization and depending on the result he would make a decision whether to proceed or not  He states that he does not want to be a burden to his family as his condition has been deteriorating last few years  He understands that his condition is complex, and does not want to focus on the quantity life  He would rather focus on the quality  He does not seem depressed, or suicidal and he is fully awake alert oriented x3  I also updated his wife Winthrop Hatchet, who stated that she supports 's decision if it came to that    In the time being, patient is agreeable to proceed with cardiac catheterization    OBJECTIVE:     Vitals:   HR:  [72-78] 78  Resp:  [18-20] 18  BP: (130-170)/(74-88) 170/85  SpO2:  [95 %-98 %] 97 %  Temp (24hrs), Av 3 °F (36 3 °C), Min:96 4 °F (35 8 °C), Max:98 °F (36 7 °C)  Current: Temperature: (!) 97 3 °F (36 3 °C)    Intake/Output Summary (Last 24 hours) at 03/22/18 0953  Last data filed at 03/22/18 0518   Gross per 24 hour   Intake              410 ml   Output             1750 ml   Net            -1340 ml       Physical Exam:   GENERAL: AAO x 3  HEENT: atraumatic, normocephalic  Oral mucosa moist, no icterus, pallor  PERRLA +  Neck supple, no JVD, no lymphadenopathy, no thryomegaly  CHEST: B/L breath sounds heard, occasional wheezing  CVS: S1, S2  No cyanosis/clubbing or edema  ABDOMEN: Soft/obese/NT/BS heard  NEUROLOGICAL: CN II -XI grossly intact  No focal motor or sensory deficits  No signs of meningeal irritation or cerebellar dysfunction  EXTREMITIES: Bilateral lower extremities edema with chronic mild stasis changes of distal anterior tibial area  Left foot dressing intact  No purulent drainage   Bilateral lower extremities motor and sensory intact   Right groin clear, dry without hematoma, drainage, hemorrhage or pulsatile mass        LABS:  Results for Miah Gross (MRN [id number]) as of 3/22/2018 09:40   3/22/2018 05:14   eGFR 94   Sodium 135 (L)   Potassium 4 1   Chloride 100   CO2 26   Anion Gap 9   BUN 11   Creatinine 0 90   Glucose 232 (H)   Calcium 8 4   WBC 7 31   RBC 4 09   Hemoglobin 11 4 (L)   Hematocrit 35 0 (L)   MCV 86   MCH 27 9   MCHC 32 6   RDW 13 3   Platelets 707   MPV 9 8     Scheduled Meds:    Current Facility-Administered Medications:  acetaminophen 650 mg Oral Q6H PRN Arabella Norris MD    aspirin 325 mg Oral Daily Arabella Norris MD    atorvastatin 40 mg Oral Daily Arabella Norris MD    calcium carbonate 500 mg Oral Daily PRN CARLTON Moody    cefTRIAXone 1,000 mg Intravenous Q24H Arabella Norris MD Last Rate: Stopped (03/21/18 1900)   clopidogrel 75 mg Oral Daily Berenice Crowley MD    docusate sodium 100 mg Oral BID Juany Stacy MD    [START ON 3/23/2018] enoxaparin 40 mg Subcutaneous Daily Berenice Crowley MD    insulin glargine 80 Units Subcutaneous HS Priti Lundberg MD    insulin lispro 1-5 Units Subcutaneous TID AC Rakan Ruiz MD    lactulose 20 g Oral BID Rakan Ruiz MD    lactulose 200 g Rectal BID PRN Rakan Ruiz MD    LORazepam 0 5 mg Oral BID PRN Rakan Ruiz MD    metoclopramide 5 mg Oral Daily Rakan Ruiz MD    metroNIDAZOLE 500 mg Intravenous Q8H Rakan Ruiz MD Last Rate: 500 mg (03/22/18 0176)   morphine injection 2 mg Intravenous Q4H PRN Rakan Ruiz MD    ondansetron 4 mg Intravenous Q6H PRN Rakan Ruiz MD    sodium chloride 100 mL/hr Intravenous Continuous Milnea Michaels DO Last Rate: 100 mL/hr (03/22/18 0615)       Continuous Infusions:    sodium chloride 100 mL/hr Last Rate: 100 mL/hr (03/22/18 0615)       PRN Meds:    acetaminophen    calcium carbonate    lactulose    LORazepam    morphine injection    ondansetron      VTE Prophylaxis: Enoxaparin subcutaneously  DISPOSITION: Pending clinical stability  Expect a prolonged hospitalization  Time Spent for Care: 20 minutes   More than 50% of total time spent on counseling and coordination of care as described above  Family will be updated  Roberto Carlos Valenzuela MD  HOSPITALIST SERVICES  3/22/2018    PLEASE NOTE:  This encounter was completed utilizing the [employer] Direct Speech Voice Recognition Software  Grammatical errors, random word insertions, pronoun errors and incomplete sentences are occasional consequences of the system due to software limitations, ambient noise and hardware issues  These may be missed by proof reading prior to affixing electronic signature  Any questions or concerns about the content, text or information contained within the body of this dictation should be directly addressed to the physician for clarification   Please do not hesitate to call me directly if you have any any questions or concerns

## 2018-03-22 NOTE — ASSESSMENT & PLAN NOTE
Vascular surgery recommends formal surgical revascularization w/L femoral-BK popliteal bypass grafting  Abdominal aortogram with left lower extremity runoff and possible endovascular intervention by IR on 3/19/2018, with unsuccessful attempt to cross chronic occlusion left popliteal artery crossing knee joint ; 3 vessel runoff  Continue with empiric antibiotics, day 8  (IV ceftriaxone plus Flagyl)  DVT has been ruled out  Patient also had lower extremity arterial Dopplers on 03/06/18 which revealed diffuse atherosclerotic disease noted throughout the femoral popliteal arteries suggested of tibioperoneal disease  Xray is negative for any osteomyelitis, , CRP 79 5  Podiatry and vascular sx input on board

## 2018-03-22 NOTE — OCCUPATIONAL THERAPY NOTE
Occupational Therapy  OT tx session cancelled  Pt has cardiac procedure this afternoon  Nursing staff reports Pt having multiple blockages and not appropriate for therapy at this time  Will continue to follow as appropriate   STUART Shaw

## 2018-03-22 NOTE — ASSESSMENT & PLAN NOTE
S/P stress test in 03/21: Appears to have inferior lateral infarct with some superimposed ischemia  Cardiology recommends cardiac catheterization  Troponin 0 56, 0 32, 0 43 --  Mild elevation of troponin  Suspicion of CAD  Cardiology on board  Continue with aspirin, statin, morphine as needed for pain  Hold  beta blockers given patient's history of orthostatic hypotension and diabetic autonomic neuropathy  Further cardiac work up as per Cardiology

## 2018-03-22 NOTE — PROGRESS NOTES
Progress Note - Podiatry  Luciano Mcgill 61 y o  male MRN: [id number]  Unit/Bed#: E4 -01 Encounter: [id number]    Assessment/Plan:  1   Left foot plantar heel eschar with associated cellulitis secondary to diabetes mellitus with neuropathy, now s/p left heel excisional debridement at bedside (Date of procedure: 3/18/18) by Dr Leland Lo   2  Left fourth digit DTI  3  DM type 2 with neuropathy-A1c: 9 4%  4  Peripheral arterial disease     Plan:  -afebrile, no leukocytosis, nontoxic in appearance   -left heel is stable with no crepitus, no fluctuance, no acute signs of infection: dressed with maxsorb, DSD   -DTI distal tip left 4th digit painted with betadine   -podiatry to continue to follow while patient in-house for monitoring and dressing changes   -prior radiograph left foot negative for osteomyelitis  -s/p agram with unsuccessful attempt to cross chronic occlusion left popliteal artery crossing knee joint ; 3 vessel runoff   -vein mapping showed adequate L GSV, plan for peripheral bypass after cardiac clearance and workup, per vascular surgery   -patient to continue with ceftriaxone/Flagyl per Internal Medicine  -patient nonweightbearing to the left lower extremity, prevalons in bed   -patient to use prevalon boots to right lower extremity as well to prevent developing pressure wounds to this extremity; patient understanding of such; PT/OT/CM following for discharge planning   -medical management per primary team    Subjective/Objective   Chief Complaint:   Chief Complaint   Patient presents with    Wound Check     Reportsto have gangerous wound on L heal area; sent into ED by PCP  Subjective: 61 y o  y/o male was seen and evaluated at bedside  Denies constitutional symptoms  Blood pressure 170/85, pulse 78, temperature (!) 97 3 °F (36 3 °C), temperature source Tympanic, resp  rate 18, height 6' 1" (1 854 m), weight 117 kg (258 lb 9 6 oz), SpO2 97 %  ,Body mass index is 34 12 kg/m²  Invasive Devices     Peripheral Intravenous Line            Peripheral IV 03/22/18 Left Arm less than 1 day              Physical Exam:   General: Alert, cooperative and no distress  Lungs: Non labored breathing  Abdomen: Soft, non-tender    Extremity: L heel wound (Friend 2) measuring approximately 6 x 5 x 0 5cm with base approximately 50% grey, 45% yellow, 5% pink, no active drainage at present, mild serosanguinous upon dressing change, left distal 4th digit with stable DTI which is dry with no acute signs of infection       [facility] 3/22     Lab, Imaging and other studies:   CBC:   Lab Results   Component Value Date    WBC 7 31 03/22/2018    HGB 11 4 (L) 03/22/2018    HCT 35 0 (L) 03/22/2018    MCV 86 03/22/2018     03/22/2018    MCH 27 9 03/22/2018    MCHC 32 6 03/22/2018    RDW 13 3 03/22/2018    MPV 9 8 03/22/2018     VTE Pharmacologic Prophylaxis: Enoxaparin (Lovenox), plavix

## 2018-03-22 NOTE — PROGRESS NOTES
Progress Note - Carolyn Home 1960, 61 y o  male MRN: [id number]    Unit/Bed#: E4 -01 Encounter: [id number]    Primary Care Provider: Lauren Toledo DO   Date and time admitted to hospital: 3/14/2018  2:14 PM        PAD (peripheral artery disease) ([facility] )   Assessment & Plan    PAD w/LLE tissue loss (eschar plantar aspect of left heel), s/p diagnostic LLE angiogram, unsuccessful recanalization L popliteal  (IR) 3/19/2018  --recommend formal surgical revascularization w/L femoral-BK popliteal bypass grafting  --+adequate LLE and right thigh conduit on vein mapping  --+abnormal nuclear stress w/inferolateral infarct w/superimposed ischemia, cardiac cath this am  --continue aspirin and statin  --continue [address] per podiatry  --continue antibiotics per primary service  --timing of LLE bypass surgery pending formal cardiology clearance and results of cardiac catheterization today  --will d/w Dr Elsy Zavala            * Non-healing wound of left heel   Assessment & Plan    L plantar heel unstageable eschar, s/p bedside debridement 3/18/18  --L foot x-ray negative for OM  --continue [address] per Podiatry  --continue Rocephin and Flagyl per primary service  --s/p unsuccessful recannulization attempt mid L popliteal occlusion 3/19/18  --will require surgical revascularization LLE after cardiology clearance complete          Diabetic neuropathy, type II diabetes mellitus ([facility] )   Assessment & Plan    Poorly controlled DM, with recent HgbA1c of 9 4  --optimize BS control for optimization of wound healing and prevention disease  --BS continue to be poorly controlled  --adjust current medical regimen per primary service        Atypical chest pain   Assessment & Plan    + inferolateral infarct w/superimposed ischemia on nuclear stress, preserved LV function (EF 55-60%) on TTE  --cardiac catheterization this am  --plavix load per cardiology  --continue ASA, statin  --await results of cardiac cath prior to decision regarding timing of LLE bypass         Hyperlipidemia   Assessment & Plan    --Continue statin therapy        Benign essential HTN   Assessment & Plan    --continue current medical regimen per primary service              Subjective:  Patient without complaints this am   Anxious regarding cardiac cath this am   Results of nuclear stress as noted per Dr Courtney Yun note  Patient denies CP, SOB, LE rest pain  Poor BS control persists  VSS    Vitals:  /85 (BP Location: Right arm)   Pulse 78   Temp (!) 97 3 °F (36 3 °C) (Tympanic)   Resp 18   Ht 6' 1" (1 854 m)   Wt 117 kg (258 lb 9 6 oz)   SpO2 97%   BMI 34 12 kg/m²     I/Os:  I/O last 3 completed shifts: In: 766 7 [P O :320; IV Piggyback:446 7]  Out: 3250 [Urine:3250]  No intake/output data recorded  Lab Results and Cultures:   Lab Results   Component Value Date    WBC 7 31 03/22/2018    HGB 11 4 (L) 03/22/2018    HCT 35 0 (L) 03/22/2018    MCV 86 03/22/2018     03/22/2018     Lab Results   Component Value Date    GLUCOSE 232 (H) 03/22/2018    CALCIUM 8 4 03/22/2018     (L) 03/22/2018    K 4 1 03/22/2018    CO2 26 03/22/2018     03/22/2018    BUN 11 03/22/2018    CREATININE 0 90 03/22/2018     Lab Results   Component Value Date    INR 1 06 03/14/2018    PROTIME 13 8 03/14/2018        Blood Culture:   Lab Results   Component Value Date    BLOODCX No Growth After 5 Days  03/15/2018    BLOODCX No Growth After 5 Days  03/15/2018   ,   Urinalysis:   Lab Results   Component Value Date    COLORU Yellow 01/02/2018    CLARITYU Clear 01/02/2018    SPECGRAV 1 025 01/02/2018    PHUR 6 0 01/02/2018    LEUKOCYTESUR (A) 01/02/2018     Elevated glucose may cause decreased leukocyte values   See urine microscopic for [facility] result/    NITRITE Negative 01/02/2018    PROTEINUA Trace (A) 01/02/2018    GLUCOSEU >=1000 (1%) (A) 01/02/2018    KETONESU Negative 01/02/2018    BILIRUBINUR Negative 01/02/2018    BLOODU Negative 01/02/2018   ,   Urine Culture: No results found for: URINECX,   Wound Culure: No results found for: WOUNDCULT    Medications:  Current Facility-Administered Medications   Medication Dose Route Frequency    acetaminophen (TYLENOL) tablet 650 mg  650 mg Oral Q6H PRN    aspirin (ECOTRIN) EC tablet 325 mg  325 mg Oral Daily    atorvastatin (LIPITOR) tablet 40 mg  40 mg Oral Daily    calcium carbonate (TUMS) chewable tablet 500 mg  500 mg Oral Daily PRN    cefTRIAXone (ROCEPHIN) IVPB (premix) 1,000 mg  1,000 mg Intravenous Q24H    clopidogrel (PLAVIX) tablet 75 mg  75 mg Oral Daily    docusate sodium (COLACE) capsule 100 mg  100 mg Oral BID    [START ON 3/23/2018] enoxaparin (LOVENOX) subcutaneous injection 40 mg  40 mg Subcutaneous Daily    insulin glargine (LANTUS) subcutaneous injection 70 Units  70 Units Subcutaneous HS    insulin lispro (HumaLOG) 100 units/mL subcutaneous injection 1-5 Units  1-5 Units Subcutaneous TID AC    lactulose 20 g/30 mL oral solution 20 g  20 g Oral BID    lactulose retention enema 200 g  200 g Rectal BID PRN    LORazepam (ATIVAN) tablet 0 5 mg  0 5 mg Oral BID PRN    metoclopramide (REGLAN) tablet 5 mg  5 mg Oral Daily    metroNIDAZOLE (FLAGYL) IVPB (premix) 500 mg  500 mg Intravenous Q8H    morphine injection 2 mg  2 mg Intravenous Q4H PRN    ondansetron (ZOFRAN) injection 4 mg  4 mg Intravenous Q6H PRN    sodium chloride 0 9 % infusion  100 mL/hr Intravenous Continuous       Imaging:  Myoview stress test 3/21/18: Final report pending  Preliminary results +inferolateral infarct w/superimposed ischemia        Physical Exam:    General appearance: alert and oriented, in no acute distress  Neurologic: Grossly normal  Neck: no adenopathy, no carotid bruit, no JVD, supple, symmetrical, trachea midline and thyroid not enlarged, symmetric, no tenderness/mass/nodules  Lungs: clear to auscultation bilaterally  Heart: regular rate and rhythm, S1, S2 normal, no murmur, click, rub or gallop  Abdomen: soft, non-tender; bowel sounds normal; no masses,  no organomegaly and nondistended  no abdominal bruits  Extremities: trace edema BLE w/chronic mild stasis changes of distal anterior tibial areas  left foot dressing intact  BLE motor and sensory intact    Right groin site clear    Wound/Incision:    Left foot/plantar heel 3/20/18      Pulse exam:  Radial: Right: 2+ Left[de-identified] 2+   Femoral: Right: 2+ Left: 2+  Popliteal: Right: non-palpable Left: non-palpable  DP: Right: non-palpable Left: non-palpable  PT: Right: non-palpable Left: non-palpable      Cecilio Hogan PA-C  3/22/2018  [facility], [phone]

## 2018-03-22 NOTE — PLAN OF CARE
Problem: Prexisting or High Potential for Compromised Skin Integrity  Goal: Skin integrity is maintained or improved  INTERVENTIONS:  - Identify patients at risk for skin breakdown  - Assess and monitor skin integrity  - Assess and monitor nutrition and hydration status  - Monitor labs (i e  albumin)  - Assess for incontinence   - Turn and reposition patient  - Assist with mobility/ambulation  - Relieve pressure over bony prominences  - Avoid friction and shearing  - Provide appropriate hygiene as needed including keeping skin clean and dry  - Evaluate need for skin moisturizer/barrier cream  - Collaborate with interdisciplinary team (i e  Nutrition, Rehabilitation, etc )   - Patient/family teaching   Outcome: Progressing      Problem: Potential for Falls  Goal: Patient will remain free of falls  INTERVENTIONS:  - Assess patient frequently for physical needs  -  Identify cognitive and physical deficits and behaviors that affect risk of falls    -  [place] fall precautions as indicated by assessment   - Educate patient/family on patient safety including physical limitations  - Instruct patient to call for assistance with activity based on assessment  - Modify environment to reduce risk of injury  - Consider OT/PT consult to assist with strengthening/mobility   Outcome: Progressing      Problem: INFECTION - ADULT  Goal: Absence or prevention of progression during hospitalization  INTERVENTIONS:  - Assess and monitor for signs and symptoms of infection  - Monitor lab/diagnostic results  - Monitor all insertion sites, i e  indwelling lines, tubes, and drains  - Monitor endotracheal (as able) and nasal secretions for changes in amount and color  - [place] appropriate cooling/warming therapies per order  - Administer medications as ordered  - Instruct and encourage patient and family to use good hand hygiene technique  - Identify and instruct in appropriate isolation precautions for identified infection/condition Outcome: Progressing    Goal: Absence of fever/infection during neutropenic period  INTERVENTIONS:  - Monitor WBC  - Implement neutropenic guidelines   Outcome: Progressing      Problem: SAFETY ADULT  Goal: Maintain or return to baseline ADL function  INTERVENTIONS:  -  Assess patient's ability to carry out ADLs; assess patient's baseline for ADL function and identify physical deficits which impact ability to perform ADLs (bathing, care of mouth/teeth, toileting, grooming, dressing, etc )  - Assess/evaluate cause of self-care deficits   - Assess range of motion  - Assess patient's mobility; develop plan if impaired  - Assess patient's need for assistive devices and provide as appropriate  - Encourage maximum independence but intervene and supervise when necessary  ¯ Involve family in performance of ADLs  ¯ Assess for home care needs following discharge   ¯ Request OT consult to assist with ADL evaluation and planning for discharge  ¯ Provide patient education as appropriate   Outcome: Progressing    Goal: Maintain or return mobility status to optimal level  INTERVENTIONS:  - Assess patient's baseline mobility status (ambulation, transfers, stairs, etc )    - Identify cognitive and physical deficits and behaviors that affect mobility  - Identify mobility aids required to assist with transfers and/or ambulation (gait belt, sit-to-stand, lift, walker, cane, etc )  - [place] fall precautions as indicated by assessment  - Record patient progress and toleration of activity level on Mobility SBAR; progress patient to next Phase/Stage  - Instruct patient to call for assistance with activity based on assessment  - Request Rehabilitation consult to assist with strengthening/weightbearing, etc    Outcome: Progressing      Problem: DISCHARGE PLANNING  Goal: Discharge to home or other facility with appropriate resources  INTERVENTIONS:  - Identify barriers to discharge w/patient and caregiver  - Arrange for needed discharge resources and transportation as appropriate  - Identify discharge learning needs (meds, wound care, etc )  - Arrange for interpretive services to assist at discharge as needed  - Refer to Case Management Department for coordinating discharge planning if the patient needs post-hospital services based on physician/advanced practitioner order or complex needs related to functional status, cognitive ability, or social support system   Outcome: Progressing      Problem: Knowledge Deficit  Goal: Patient/family/caregiver demonstrates understanding of disease process, treatment plan, medications, and discharge instructions  Complete learning assessment and assess knowledge base    Interventions:  - Provide teaching at level of understanding  - Provide teaching via preferred learning methods   Outcome: Progressing      Problem: SKIN/TISSUE INTEGRITY - ADULT  Goal: Skin integrity remains intact  INTERVENTIONS  - Identify patients at risk for skin breakdown  - Assess and monitor skin integrity  - Assess and monitor nutrition and hydration status  - Monitor labs (i e  albumin)  - Assess for incontinence   - Turn and reposition patient  - Assist with mobility/ambulation  - Relieve pressure over bony prominences  - Avoid friction and shearing  - Provide appropriate hygiene as needed including keeping skin clean and dry  - Evaluate need for skin moisturizer/barrier cream  - Collaborate with interdisciplinary team (i e  Nutrition, Rehabilitation, etc )   - Patient/family teaching   Outcome: Progressing    Goal: Incision(s), wounds(s) or drain site(s) healing without S/S of infection  INTERVENTIONS  - Assess and document risk factors for skin impairment   - Assess and document dressing, incision, wound bed, drain sites and surrounding tissue  - Initiate Nutrition services consult and/or wound management as needed   Outcome: Progressing      Problem: Nutrition/Hydration-ADULT  Goal: Nutrient/Hydration intake appropriate for improving, restoring or maintaining nutritional needs  Monitor and assess patient's nutrition/hydration status for malnutrition (ex- brittle hair, bruises, dry skin, pale skin and conjunctiva, muscle wasting, smooth red tongue, and disorientation)  Collaborate with interdisciplinary team and initiate plan and interventions as ordered  Monitor patient's weight and dietary intake as ordered or per policy  Utilize nutrition screening tool and intervene per policy  Determine patient's food preferences and provide high-protein, high-caloric foods as appropriate       INTERVENTIONS:  - Monitor oral intake, urinary output, labs, and treatment plans  - Assess nutrition and hydration status and recommend course of action  - Evaluate amount of meals eaten  - Assist patient with eating if necessary   - Allow adequate time for meals  - Recommend/ encourage appropriate diets, oral nutritional supplements, and vitamin/mineral supplements  - Order, calculate, and assess calorie counts as needed  - Recommend, monitor, and adjust tube feedings and TPN/PPN based on assessed needs  - Assess need for intravenous fluids  - Provide specific nutrition/hydration education as appropriate  - Include patient/family/caregiver in decisions related to nutrition   Outcome: Progressing      Problem: DISCHARGE PLANNING - CARE MANAGEMENT  Goal: Discharge to post-acute care or home with appropriate resources  INTERVENTIONS:  - Conduct assessment to determine patient/family and health care team treatment goals, and need for post-acute services based on payer coverage, community resources, and patient preferences, and barriers to discharge  - Address psychosocial, clinical, and financial barriers to discharge as identified in assessment in conjunction with the patient/family and health care team  - Arrange appropriate level of post-acute services according to patients   needs and preference and payer coverage in collaboration with the physician and health care team  - Communicate with and update the patient/family, physician, and health care team regarding progress on the discharge plan  - Arrange appropriate transportation to post-acute venues   Outcome: Progressing

## 2018-03-22 NOTE — ASSESSMENT & PLAN NOTE
+ inferolateral infarct w/superimposed ischemia on nuclear stress, preserved LV function (EF 55-60%) on TTE  --cardiac catheterization this am  --plavix load per cardiology  --continue ASA, statin  --await results of cardiac cath prior to decision regarding timing of LLE bypass

## 2018-03-23 ENCOUNTER — HOSPITAL ENCOUNTER (INPATIENT)
Facility: HOSPITAL | Age: 58
LOS: 12 days | DRG: 235 | End: 2018-04-04
Attending: INTERNAL MEDICINE | Admitting: THORACIC SURGERY (CARDIOTHORACIC VASCULAR SURGERY)
Payer: COMMERCIAL

## 2018-03-23 VITALS
TEMPERATURE: 97.8 F | BODY MASS INDEX: 34.27 KG/M2 | HEART RATE: 82 BPM | WEIGHT: 258.6 LBS | HEIGHT: 73 IN | SYSTOLIC BLOOD PRESSURE: 142 MMHG | RESPIRATION RATE: 18 BRPM | OXYGEN SATURATION: 98 % | DIASTOLIC BLOOD PRESSURE: 87 MMHG

## 2018-03-23 DIAGNOSIS — I25.10 CORONARY ARTERY DISEASE: ICD-10-CM

## 2018-03-23 DIAGNOSIS — Z01.818 PRE-OP EXAM: ICD-10-CM

## 2018-03-23 DIAGNOSIS — IMO0002 UNCONTROLLED DIABETES MELLITUS TYPE 2 WITH ATHEROSCLEROSIS OF ARTERIES OF EXTREMITIES: ICD-10-CM

## 2018-03-23 DIAGNOSIS — I50.20 SYSTOLIC CONGESTIVE HEART FAILURE, UNSPECIFIED CONGESTIVE HEART FAILURE CHRONICITY: ICD-10-CM

## 2018-03-23 DIAGNOSIS — Z95.1 S/P CABG (CORONARY ARTERY BYPASS GRAFT): Primary | ICD-10-CM

## 2018-03-23 DIAGNOSIS — J95.89 ACUTE RESPIRATORY INSUFFICIENCY, POSTOPERATIVE: ICD-10-CM

## 2018-03-23 DIAGNOSIS — S91.302A NON-HEALING WOUND OF LEFT HEEL: ICD-10-CM

## 2018-03-23 LAB
ANION GAP SERPL CALCULATED.3IONS-SCNC: 8 MMOL/L (ref 4–13)
BUN SERPL-MCNC: 9 MG/DL (ref 5–25)
CALCIUM SERPL-MCNC: 8.3 MG/DL (ref 8.3–10.1)
CHLORIDE SERPL-SCNC: 101 MMOL/L (ref 100–108)
CO2 SERPL-SCNC: 26 MMOL/L (ref 21–32)
CREAT SERPL-MCNC: 0.88 MG/DL (ref 0.6–1.3)
GFR SERPL CREATININE-BSD FRML MDRD: 95 ML/MIN/1.73SQ M
GLUCOSE SERPL-MCNC: 172 MG/DL (ref 65–140)
GLUCOSE SERPL-MCNC: 173 MG/DL (ref 65–140)
POTASSIUM SERPL-SCNC: 4.1 MMOL/L (ref 3.5–5.3)
SODIUM SERPL-SCNC: 135 MMOL/L (ref 136–145)

## 2018-03-23 PROCEDURE — 82948 REAGENT STRIP/BLOOD GLUCOSE: CPT

## 2018-03-23 PROCEDURE — 80048 BASIC METABOLIC PNL TOTAL CA: CPT | Performed by: INTERNAL MEDICINE

## 2018-03-23 PROCEDURE — 99232 SBSQ HOSP IP/OBS MODERATE 35: CPT | Performed by: INTERNAL MEDICINE

## 2018-03-23 PROCEDURE — 99223 1ST HOSP IP/OBS HIGH 75: CPT | Performed by: INTERNAL MEDICINE

## 2018-03-23 PROCEDURE — 99232 SBSQ HOSP IP/OBS MODERATE 35: CPT | Performed by: NURSE PRACTITIONER

## 2018-03-23 PROCEDURE — 99239 HOSP IP/OBS DSCHRG MGMT >30: CPT | Performed by: INTERNAL MEDICINE

## 2018-03-23 RX ORDER — ATORVASTATIN CALCIUM 40 MG/1
40 TABLET, FILM COATED ORAL DAILY
Status: CANCELLED | OUTPATIENT
Start: 2018-03-23

## 2018-03-23 RX ORDER — METOCLOPRAMIDE 10 MG/1
5 TABLET ORAL DAILY
Status: CANCELLED | OUTPATIENT
Start: 2018-03-24

## 2018-03-23 RX ORDER — INSULIN GLARGINE 100 [IU]/ML
80 INJECTION, SOLUTION SUBCUTANEOUS
Status: CANCELLED | OUTPATIENT
Start: 2018-03-23

## 2018-03-23 RX ORDER — DOCUSATE SODIUM 100 MG/1
100 CAPSULE, LIQUID FILLED ORAL 2 TIMES DAILY
Status: CANCELLED | OUTPATIENT
Start: 2018-03-23

## 2018-03-23 RX ORDER — SODIUM CHLORIDE 9 MG/ML
100 INJECTION, SOLUTION INTRAVENOUS CONTINUOUS
Status: DISPENSED | OUTPATIENT
Start: 2018-03-23 | End: 2018-03-23

## 2018-03-23 RX ORDER — ACETAMINOPHEN 325 MG/1
650 TABLET ORAL EVERY 6 HOURS PRN
Status: DISCONTINUED | OUTPATIENT
Start: 2018-03-23 | End: 2018-03-28 | Stop reason: HOSPADM

## 2018-03-23 RX ORDER — CALCIUM CARBONATE 200(500)MG
500 TABLET,CHEWABLE ORAL DAILY PRN
Status: DISCONTINUED | OUTPATIENT
Start: 2018-03-23 | End: 2018-03-28 | Stop reason: HOSPADM

## 2018-03-23 RX ORDER — ACETAMINOPHEN 325 MG/1
650 TABLET ORAL EVERY 6 HOURS PRN
Status: CANCELLED | OUTPATIENT
Start: 2018-03-23

## 2018-03-23 RX ORDER — ATORVASTATIN CALCIUM 40 MG/1
40 TABLET, FILM COATED ORAL DAILY
Status: DISCONTINUED | OUTPATIENT
Start: 2018-03-23 | End: 2018-03-28 | Stop reason: HOSPADM

## 2018-03-23 RX ORDER — LACTULOSE 20 G/30ML
20 SOLUTION ORAL 2 TIMES DAILY
Status: DISCONTINUED | OUTPATIENT
Start: 2018-03-23 | End: 2018-03-25 | Stop reason: DRUGHIGH

## 2018-03-23 RX ORDER — ONDANSETRON 2 MG/ML
4 INJECTION INTRAMUSCULAR; INTRAVENOUS EVERY 6 HOURS PRN
Status: CANCELLED | OUTPATIENT
Start: 2018-03-23

## 2018-03-23 RX ORDER — SODIUM CHLORIDE 9 MG/ML
100 INJECTION, SOLUTION INTRAVENOUS CONTINUOUS
Status: CANCELLED | OUTPATIENT
Start: 2018-03-23 | End: 2018-04-01

## 2018-03-23 RX ORDER — DOCUSATE SODIUM 100 MG/1
100 CAPSULE, LIQUID FILLED ORAL 2 TIMES DAILY
Status: DISCONTINUED | OUTPATIENT
Start: 2018-03-23 | End: 2018-03-28 | Stop reason: HOSPADM

## 2018-03-23 RX ORDER — LORAZEPAM 0.5 MG/1
0.5 TABLET ORAL 2 TIMES DAILY PRN
Status: DISCONTINUED | OUTPATIENT
Start: 2018-03-23 | End: 2018-03-28 | Stop reason: HOSPADM

## 2018-03-23 RX ORDER — MORPHINE SULFATE 2 MG/ML
2 INJECTION, SOLUTION INTRAMUSCULAR; INTRAVENOUS EVERY 4 HOURS PRN
Status: CANCELLED | OUTPATIENT
Start: 2018-03-23

## 2018-03-23 RX ORDER — CALCIUM CARBONATE 200(500)MG
500 TABLET,CHEWABLE ORAL DAILY PRN
Status: CANCELLED | OUTPATIENT
Start: 2018-03-23

## 2018-03-23 RX ORDER — INSULIN GLARGINE 100 [IU]/ML
80 INJECTION, SOLUTION SUBCUTANEOUS
Status: DISCONTINUED | OUTPATIENT
Start: 2018-03-23 | End: 2018-03-24

## 2018-03-23 RX ORDER — METOPROLOL TARTRATE 5 MG/5ML
2.5 INJECTION INTRAVENOUS EVERY 6 HOURS PRN
Status: DISCONTINUED | OUTPATIENT
Start: 2018-03-23 | End: 2018-03-28 | Stop reason: HOSPADM

## 2018-03-23 RX ORDER — ASPIRIN 325 MG
325 TABLET ORAL DAILY
Status: DISCONTINUED | OUTPATIENT
Start: 2018-03-24 | End: 2018-03-28 | Stop reason: HOSPADM

## 2018-03-23 RX ORDER — LORAZEPAM 0.5 MG/1
0.5 TABLET ORAL 2 TIMES DAILY PRN
Status: CANCELLED | OUTPATIENT
Start: 2018-03-23

## 2018-03-23 RX ORDER — LACTULOSE 20 G/30ML
20 SOLUTION ORAL 2 TIMES DAILY
Status: CANCELLED | OUTPATIENT
Start: 2018-03-23

## 2018-03-23 RX ORDER — ONDANSETRON 2 MG/ML
4 INJECTION INTRAMUSCULAR; INTRAVENOUS EVERY 6 HOURS PRN
Status: DISCONTINUED | OUTPATIENT
Start: 2018-03-23 | End: 2018-03-28 | Stop reason: HOSPADM

## 2018-03-23 RX ORDER — MORPHINE SULFATE 2 MG/ML
2 INJECTION, SOLUTION INTRAMUSCULAR; INTRAVENOUS EVERY 4 HOURS PRN
Status: DISCONTINUED | OUTPATIENT
Start: 2018-03-23 | End: 2018-03-28 | Stop reason: HOSPADM

## 2018-03-23 RX ORDER — METOCLOPRAMIDE 10 MG/1
5 TABLET ORAL DAILY
Status: DISCONTINUED | OUTPATIENT
Start: 2018-03-24 | End: 2018-03-28 | Stop reason: HOSPADM

## 2018-03-23 RX ADMIN — ENOXAPARIN SODIUM 40 MG: 40 INJECTION SUBCUTANEOUS at 09:20

## 2018-03-23 RX ADMIN — ASPIRIN 325 MG: 325 TABLET, COATED ORAL at 09:20

## 2018-03-23 RX ADMIN — INSULIN LISPRO 2 UNITS: 100 INJECTION, SOLUTION INTRAVENOUS; SUBCUTANEOUS at 13:15

## 2018-03-23 RX ADMIN — SODIUM CHLORIDE 100 ML/HR: 0.9 INJECTION, SOLUTION INTRAVENOUS at 04:01

## 2018-03-23 RX ADMIN — ATORVASTATIN CALCIUM 40 MG: 40 TABLET, FILM COATED ORAL at 19:35

## 2018-03-23 RX ADMIN — INSULIN LISPRO 1 UNITS: 100 INJECTION, SOLUTION INTRAVENOUS; SUBCUTANEOUS at 22:06

## 2018-03-23 RX ADMIN — METOCLOPRAMIDE HYDROCHLORIDE 5 MG: 10 TABLET ORAL at 09:20

## 2018-03-23 RX ADMIN — METRONIDAZOLE 500 MG: 500 INJECTION, SOLUTION INTRAVENOUS at 20:58

## 2018-03-23 RX ADMIN — COLLAGENASE SANTYL: 250 OINTMENT TOPICAL at 09:20

## 2018-03-23 RX ADMIN — INSULIN GLARGINE 80 UNITS: 100 INJECTION, SOLUTION SUBCUTANEOUS at 22:07

## 2018-03-23 RX ADMIN — METRONIDAZOLE 500 MG: 500 INJECTION, SOLUTION INTRAVENOUS at 09:18

## 2018-03-23 RX ADMIN — METRONIDAZOLE 500 MG: 500 INJECTION, SOLUTION INTRAVENOUS at 01:03

## 2018-03-23 RX ADMIN — LACTULOSE 20 G: 20 SOLUTION ORAL at 19:35

## 2018-03-23 RX ADMIN — DOCUSATE SODIUM 100 MG: 100 CAPSULE, LIQUID FILLED ORAL at 19:35

## 2018-03-23 RX ADMIN — CEFTRIAXONE 1000 MG: 1 INJECTION, SOLUTION INTRAVENOUS at 19:48

## 2018-03-23 NOTE — ASSESSMENT & PLAN NOTE
+ inferolateral infarct w/superimposed ischemia on nuclear stress, preserved LV function (EF 55-60%) on TTE  --cardiac catheterization with significant multivessel dx  --plavix load per cardiology  --continue ASA, statin  --pending CT Surgery consult for CABG; LLE revascularization will be determined after CABG

## 2018-03-23 NOTE — H&P
History and Physical - [facility] Medicine    Patient Information: Sandra Cui 61 y o  male MRN: [id number]  Unit/Bed#: [facility] 502-16 Encounter: [id number]  Admitting Physician: Rika Coy MD  PCP: Willie Roblero DO  Date of Admission:  03/23/18    Assessment/Plan:    Hospital Problem List:     Principal Problem:    Triple vessel coronary artery disease  Active Problems:    Anxiety and depression    Benign essential HTN    Diabetic autonomic neuropathy associated with type 2 diabetes mellitus ([facility] )    Hyperlipidemia    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 (HCC)    PAD (peripheral artery disease) ([facility] )    Non-healing wound of left heel    Morbid obesity ([facility] )      Plan for the Primary Problem(s):  · Severe triple-vessel coronary artery disease by cardiac catheterization  Continue with aspirin and statin therapy  Cardiothoracic surgery consultation will be obtained for assessment of coronary artery bypass graft grafting  Nuclear scan revealed left ventricular ejection fraction at 45%   · Nonhealing left heel ulcer per severe underlying peripheral arterial disease  Recent left lower extremity arteriogram reveals extensive popliteal disease  Patient will need vascular intervention once cardiac status is stable  Vascular surgery consultation will be obtained  Patient currently on IV antibiotics  Continue with wound care  · Uncontrolled type 2 diabetes with severe peripheral neuropathy  Endocrine consultation will be obtained  Continue with current insulin regimen  · Chronic peripheral edema likely secondary to chronic congestive heart failure  Diuretics on hold in view of recent cardiac catheterization  · Dyslipidemia  Continue with statin therapy  · Hypertension  Patient has been off antihypertensive medication secondary to multiple episodes of orthostatic hypotension the likely secondary to underlying diabetic autonomic neuropathy    Continue monitor blood pressure closely and treat with antihypertensive medications for sustained systolic blood [id number] Hg  VTE Prophylaxis: Enoxaparin (Lovenox)  /   Code Status: FULL CODEPOLST: POLST form is not discussed and not completed at this time  Anticipated Length of Stay:  Patient will be admitted on an Inpatient basis with an anticipated length of stay of  > 2 midnights  Justification for Hospital Stay:  cardiothoracic surgery evaluation forcoronary artery bypass surgery    Total Time for Visit, including Counseling / Coordination of Care: 45 minutes  Greater than 50% of this total time spent on direct patient counseling and coordination of care  Chief Complaint:   Abnormal cardiac catheterization    History of Present Illness:    Elias Sandoval is a 61 y o  male who presents with abnormal cardiac catheterization showing triple-vessel disease  The patient initially presented to [facility] [place]/2018 with left lower extremity wound and chest pain  Patient has a history significant for type 2 diabetes which is poorly controlled(HBAic 9 4)  He also has history of diabetic gastroparesis,  severe peripheral neuropathy secondary to diabetes, hyperlipidemia, and chronic left lower extremity/heel ulcer  During his stay at [facility] he also had non ST elevation MI for which she underwent cardiology evaluation  He had a positive stress test and underwent cardiac catheterization today which showed severe triple-vessel disease  He was transferred here for cardiothoracic surgery evaluation  During his stay in the hospital the patient was worked up for his chronic left heel ulcer  He had an abdominal aortogram with unsuccessful attempt to bypass occlusion of left popliteal artery  Vascular surgery recommended formal surgical revascularization with fem popliteal bypass after cardiac issues were resolved    Patient was maintained on intravenous antibiotics(ceftriaxone and Flagyl day# 9 )  Patient was kept of beta-blocker secondary to multiple episodes of hypotension which was thought to be secondary to diabetic autonomic neuropathy  He was maintained on insulin for uncontrolled diabetes   Currently patient denies any chest discomfort     Denies any shortness of breath dizziness, or lightheadedness  Review of Systems:    Review of Systems   Constitutional: Negative  HENT: Negative  Eyes: Negative  Respiratory: Positive for chest tightness  Cardiovascular: Negative  Gastrointestinal: Negative  Endocrine: Negative  Genitourinary: Negative  Skin: Positive for color change and wound  Allergic/Immunologic: Negative  Neurological: Positive for numbness  Hematological: Negative  Psychiatric/Behavioral: Negative  Past Medical and Surgical History:     Past Medical History:   Diagnosis Date    Anemia     Autonomic neuropathy     [facility] mellitus [facility] )     Diabetic autonomic neuropathy associated with type 2 diabetes mellitus ([facility] )     Last Assessed: 12/28/2017    Orthostatic hypotension        No past surgical history on file  Meds/Allergies:    Prior to Admission medications    Medication Sig Start Date End Date Taking?  Authorizing Provider   atorvastatin (LIPITOR) 40 mg tablet Take 1 tablet (40 mg total) by mouth daily 2/20/18   Lonnie Srivastava, DO   insulin aspart protamine-insulin aspart (NOVOLOG MIX 70/30 FLEXPEN) 100 Units/mL SUPN Inject under the skin Twice daily 2/11/15   Historical Provider, MD   LANTUS SOLOSTAR injection pen 100 units/mL Inject 0 8 mL (80 Units total) under the skin 2 (two) times a day 2/20/18   Lonnie Srivastava, DO   LORazepam (ATIVAN) 0 5 mg tablet Take by mouth every 8 (eight) hours as needed for anxiety    Historical Provider, MD   metoclopramide (REGLAN) 5 mg tablet Take 5 mg by mouth daily      Historical Provider, MD   midodrine (PROAMATINE) 5 mg tablet Take 1 tablet (5 mg total) by mouth 3 (three) times a day 2/20/18   Randy Castellanos DO Wander   torsemide (DEMADEX) 10 mg tablet Take 1 tablet (10 mg total) by mouth daily 2/21/18   Chery Conte DO     I have reviewed home medications using allscripts  Allergies: Allergies   Allergen Reactions    Metformin        Social History:     Marital Status: /Civil Union   Substance Use History:   History   Alcohol Use No     History   Smoking Status    Former Smoker    Packs/day: 1 00    Years: 12 00    Quit date: 3/23/1994   Smokeless Tobacco    Never Used     Comment: Former smoker per Allscripts     History   Drug Use No       Family History:    non-contributory    Physical Exam:     Vitals:   Blood Pressure: (!) 175/81 (Map 117) (03/23/18 1725)  Pulse: 79 (03/23/18 1725)  Temperature: 97 5 °F (36 4 °C) (03/23/18 1725)  Temp Source: Oral (03/23/18 1725)  Respirations: 16 (03/23/18 1725)  Height: 6' 1" (185 4 cm) (03/23/18 1725)  Weight - Scale: 121 kg (266 lb 8 6 oz) (03/23/18 1725)  SpO2: 99 % (03/23/18 1725)    Physical Exam   Constitutional: He is oriented to person, place, and time  Obese   HENT:   Head: Normocephalic and atraumatic  Mouth/Throat: Oropharynx is clear and moist    Eyes: Conjunctivae are normal  Pupils are equal, round, and reactive to light  Neck: Neck supple  No JVD present  Cardiovascular: Normal rate and regular rhythm  Pulmonary/Chest: Effort normal and breath sounds normal    Abdominal: Soft  Bowel sounds are normal    Musculoskeletal: He exhibits edema and deformity  Bilateral lower extremity edema with stasis dermatitis   Neurological: He is alert and oriented to person, place, and time  Skin:   Chronic left heel foot ulcer with dressing in place, no drainage or discharge  Bilateral nonpalpable palpable popliteal, dorsalis pedis and posterior tibial pulsations   Vitals reviewed  Additional Data:     Lab Results: I have personally reviewed pertinent reports          Results from last 7 days  Lab Units 03/22/18  0514  03/19/18  0556   WBC Thousand/uL 7 31  < > 8 27   HEMOGLOBIN g/dL 11 4*  < > 11 7*   HEMATOCRIT % 35 0*  < > 35 4*   PLATELETS Thousands/uL 271  < > 271   NEUTROS PCT %  --   --  72   LYMPHS PCT %  --   --  18   MONOS PCT %  --   --  7   EOS PCT %  --   --  2   < > = values in this interval not displayed  Results from last 7 days  Lab Units 03/23/18  0549   SODIUM mmol/L 135*   POTASSIUM mmol/L 4 1   CHLORIDE mmol/L 101   CO2 mmol/L 26   BUN mg/dL 9   CREATININE mg/dL 0 88   CALCIUM mg/dL 8 3   GLUCOSE RANDOM mg/dL 172*           Imaging: I have personally reviewed pertinent reports  Xr Foot 3+ Views Left    Result Date: 3/14/2018  Narrative: LEFT FOOT INDICATION:   Left heel gangrenous wound, possible Osteo  COMPARISON:  None  VIEWS:  XR FOOT 3+ VW LEFT FINDINGS: There is no acute fracture or dislocation  No significant degenerative changes  There is posterior calcaneal bone spur formation  No lytic or blastic lesions seen  There is no soft tissue gas  There is no osteomyelitis identified  There is prominent arterial calcification in the ankle and foot  There is swelling over the dorsal aspect of the foot  Impression: No osteomyelitis identified  Soft tissue swelling  Atherosclerosis  Posterior calcaneal bone spur formation  Workstation performed: [id number]     Vas Lower Limb Arterial Duplex, Limited/unilateral    Result Date: 3/6/2018  Narrative:  [facility] REPORT CLINICAL: Indications:  Type 2 diabetes mellitus with foot ulcer [E11 621]  Left heel ulceration  Risk Factors: The patient has history of Hyperlipidemia, Hypertension, previous remote smoking, and Diabetes  Right Brachial Pressure:  144/ mm Hg, Left Brachial Pressure:  133/ mm Hg     FINDINGS:  Left                   PSV  EDV  Common Femoral Artery  153   23  Prox Profunda          140   26  Prox SFA               159   33  Mid SFA                125   25  Dist SFA               114   25  Proximal Pop           140   28  Distal Pop [address]  Tibial       78   29     CONCLUSION: Impression: Right Lower Limb Ankle Pressure: 188  mm Hg ,  YUDITH: 1 31, normal range  Right Metatarsal Pressure: 172  mm Hg  Right Great Toe Pressure 108  mm Hg ,  above healing threshold for a diabetic  Left Lower Limb Ankle Pressure 245 mm Hg , YUDITH: 1 7, supra normal, consistent with poorly compressible vessels  Left Metatarsal Pressure: 103  mm Hg  Left Great Toe Pressure 51  mm Hg , above healing threshold for a diabetic  Conclusion: Diffuse atherosclerotic disease noted throughout the femoropopliteal arteries  Findings suggests tibioperoneal disease  SIGNATURE: Electronically Signed by: Jacque Powell MD, RPVI on 2018-03-06 03:55:50 PM    Vas Lower Limb Vein Mapping Bypass Graft    Result Date: 3/21/2018  Narrative:  [facility] REPORT CLINICAL: Indications: Lower leg vein mapping, pre-op for use as conduit in bypass graft of the left leg  FINDINGS:  Segment         Right   Left                            AP(mm)  AP(mm)  GSV Inguinal       5 9     8 7  GSV Prox Thigh     5 3     4 6  GSV Mid Thigh      3 6     4 0  GSV Dist Thigh     3 7     3 8  GSV Knee           4 6     4 0  GSV Mid Calf               3 8  GSV Ankle                  2 9     CONCLUSION:  Impression: RIGHT LOWER LIMB: The great saphenous vein is patent from the groin to the knee, and not visualized in the calf  The vein is of adequate caliber and quality for graft conduit with intraluminal diameters ranging from 5 9mm to 3 6mm throughout the leg  LEFT LOWER LIMB: The great saphenous vein is patent  from the groin to the ankle  The vein is of adequate caliber and quality for graft conduit with intraluminal diameters ranging from 8 7mm to 2 8 mm throughout the leg    SIGNATURE: Electronically Signed by: Monique Del Angel on 2018-03-21 10:15:57 AM    Ir Abdominal Angiography / Intervention    Result Date: 3/21/2018  Narrative: PROCEDURE: Abdominal aortogram and left lower extremity arteriogram  HISTORY: 74-year-old male patient with peripheral arterial disease, hypertension and type II diabetes  He has a nonhealing left foot ulcer  COMMENTS: The patient was identified  The procedure, risks, benefits and alternatives were explained to the patient who expressed understanding and patient signed an informed consent  The patient was placed in the supine position  Real-time ultrasound examination was performed over the right groin, showing patent, pulsatile common femoral artery  A permanent ultrasound image was recorded  Maximum sterile barrier technique including cap, mask, gown and gloves, as well as a large sterile sheet was used  Appropriate hand hygiene was performed  The right groin was prepped using 2% chlorhexidine for cutaneous antisepsis and draped in the usual  sterile fashion  The ultrasound probe was introduced to the field in a sterile sleeve  Sterile ultrasound gel was used  Timeout verification, with all participants present, was performed prior to any intervention  1% lidocaine (10 mL) was used for local anesthesia  Under real-time ultrasound guidance, the right common femoral artery was accessed in retrograde fashion using a 21 gauge micropuncture needle  Using standard needle, guidewire and catheter exchange techniques, a 5 Russian vascular sheath was placed  A 5 Russian omni flush catheter was advanced over a 0 035 inch Apollidonson guidewire into the abdominal aorta and positioned at the level of T12-L1 for abdominal aortogram  The catheter was repositioned at the level of the aortic bifurcation for pelvic arteriography in both oblique projections  The omni flush catheter was saddled over the aortic bifurcation with the aid of a 0 035 inch angled glidewire   The catheter was advanced into the left common femoral artery and positioned for left lower extremity arteriogram     FINDINGS: - Single patent left renal artery with no significant stenosis  2 patent right renal arteries with no significant stenosis  - No significant aortoiliac atherosclerotic change  - Patent common femoral artery with no significant stenosis, bilaterally  - Patent left profunda femoris artery and branches  - Patent left superficial femoral artery with short segment (2 cm) mild stenosis distally  - Patent above-knee popliteal artery with diffuse atherosclerotic contour irregularity but no significant stenosis  A 5 cm segment of the popliteal artery crossing the knee joint is occluded chronically  The distal popliteal artery is collaterally reconstituted  Just proximal to the takeoff of the anterior tibial artery, the popliteal artery is stenotic  -Three-vessel left lower extremity runoff with the anterior tibial artery dominant  The peroneal and posterior tibial arteries are of small caliber and have multiple diseased segments  The dorsalis pedis artery is patent  The plantar arch is opacified  INTERVENTION: The 5-Tajik short vascular sheath was exchanged over a 0 035 inch Magic torque guidewire for a 6-Tajik Balkan up and over vascular sheath  The tip of the sheath was positioned in the left common femoral artery  Multiple attempts were made to cross the mid popliteal artery segmental occlusion but this was unsuccessful  Follow-up imaging showed no adverse change to the runoff morphology  Vascular sheath removal and hemostasis was accomplished using a Mynx Ace vascular closure device followed by a brief period of manual compression  A sterile dressing was applied  The patient tolerated the procedure well  The findings were discussed with the referring vascular surgeon, Dr Rm Melara Doctor  Fluoroscopy time: 13 9 minutes  Image count: 223  Contrast: 100 mL diluted Visipaque 320  Estimated blood loss: None  VTe prophylaxis: Short duration procedure not requiring VTe prophylaxis  Complications: None   Medications: IV moderate sedation was utilized for 1 hour and 20 minutes  The patient's cardiorespiratory status was monitored before, during and after the procedure by interventional radiology nursing staff, under my direct supervision  Impression: 1  No significant aortoiliac atherosclerotic disease  2   Patent left superficial femoral artery with mild atherosclerotic changes  2 cm distal segment with mild stenosis  This does not appear to be hemodynamically significant  3   Chronic 5 cm segment occlusion of the popliteal artery, crossing the knee joint  Multiple well-developed collaterals reconstitute the distal popliteal artery  At its distalmost extent there is a short segment stenosis  Attempts to recanalize the popliteal artery were unsuccessful  4   Three-vessel left lower extremity runoff with the anterior tibial artery dominant  Patent dorsalis pedis artery and patent pedal arch  Workstation performed: [id number]       EKG, Pathology, and Other Studies Reviewed on Admission:   · EKG: Noted    Allscripts / Epic Records Reviewed: Yes     ** Please Note: This note has been constructed using a voice recognition system   **

## 2018-03-23 NOTE — PLAN OF CARE
Problem: DISCHARGE PLANNING - CARE MANAGEMENT  Goal: Discharge to post-acute care or home with appropriate resources  INTERVENTIONS:  - Conduct assessment to determine patient/family and health care team treatment goals, and need for post-acute services based on payer coverage, community resources, and patient preferences, and barriers to discharge  - Address psychosocial, clinical, and financial barriers to discharge as identified in assessment in conjunction with the patient/family and health care team  - Arrange appropriate level of post-acute services according to patients   needs and preference and payer coverage in collaboration with the physician and health care team  - Communicate with and update the patient/family, physician, and health care team regarding progress on the discharge plan  - Arrange appropriate transportation to post-acute venues   Outcome: Adequate for Discharge  Pt needs to be transfer to [facility] for a CABG  Medical Necessity form completed and put in binder to be given to transport and copy put in bin to be scan

## 2018-03-23 NOTE — DISCHARGE SUMMARY
1 [facility]    NAME: Goldie Moreno  AGE: 61 y o  SEX: male   MRN: [id number]   Encounter: [id number]   Unit/Bed: E4 -01     Admitting Provider:  Chapin Talbot MD  Discharge Provider:  Iman Perez MD  Admission Date: 3/14/2018       Discharge Date: 03/23/18   LOS: 9  Primary Care Physician at Discharge: Monika Harris [phone]    DISCHARGE DIAGNOSES  · Nonhealing wound of left heel/4th digit DTI with severe peripheral artery disease with possible lower extremity bypass planned in the future  · Chest pain with Severe three-vessel coronary artery disease  · Probable coronary artery disease with wall motion abnormality seen on echocardiogram  · NSTEMI  · Diabetes mellitus uncontrolled  · Orthostatic hypotension secondary to autonomic neuropathy secondary to poorly controlled diabetes mellitus    SECONDARY DIAGNOSES  · Diabetes mellitus uncontrolled  · Essential hypertension  · Peripheral artery disease  · Diabetic retinopathy  · Diabetic autonomic neuropathy  · Bilateral lower extremity edema  · Diabetic gastroparesis  · Morbid obesity    HOSPITAL COURSE:  Goldie Moreno is a 61 y o  male who presents with evaluation of left lower extremity wound and chest pain  Patient has medical history significant for diabetes mellitus, hyperlipidemia, hypothyroidism, gastroparesis, diabetic autonomic neuropathy, diabetic retinopathy, peripheral neuropathy, patient lives at home with his wife and uses a walker to walk  Patient states he uses a walker due to his peripheral neuropathy  Patient states that about 1 month ago he began having a wound to his left lower extremity unsure how it started  Patient has been following his PCP and has progressively worsened  Patient denies any fever, chills, nausea, vomiting, diarrhea, abdominal pain   PCP was concerned and patient was sent for further evaluation in the ED  Patient is also complaining of having 6 month history of intermittent chest pain, described as 5/10 on pain scale, localized to the left/mid sternal region, described as pressure-like in nature  Pain is worse with exertion and lying flat  Patient also had associated dyspnea, orthopnea and PND  Patient states he has to sleep upright in a reclining position  He denies any palpitations or diaphoresis  Below is the short summary of hospital stay:    Non-healing wound of left heel/severe peripheral arterial disease     · Vascular surgery recommends formal surgical revascularization w/L femoral-BK popliteal bypass grafting  · Abdominal aortogram with left lower extremity runoff and possible endovascular intervention by IR on 3/19/2018, with unsuccessful attempt to cross chronic occlusion left popliteal artery crossing knee joint ; 3 vessel runoff  · Patient has been continue with empiric antibiotics, day 9  (IV ceftriaxone plus Flagyl)  · DVT has been ruled out  · Patient also had lower extremity arterial Dopplers on 03/06/18 which revealed diffuse atherosclerotic disease noted throughout the femoral popliteal arteries suggested of tibioperoneal disease  · Xray is negative for any osteomyelitis, , CRP 79 5  · Podiatry and vascular sx input on board  · Unfortunately vascular surgery has taken a backseat in view of patient's triple-vessel disease that needs to be addressed 1st prior to any intervention   Atypical chest pain/Severe three-vessel coronary artery disease/NSTEMI     · S/P stress test in 03/21/2018: Appears to have inferior lateral infarct with some superimposed ischemia  · S/P cardiac catheterization on 03/22/2018:  Severe three-vessel coronary artery disease that required bypass surgery  · Troponin 0 56, 0 32, 0 43 --  Mild elevation of troponin   Suspicion of CAD  · Cardiology on board    · Continue with aspirin, statin, morphine as needed for pain   · Hold beta blockers given patient's history of orthostatic hypotension and diabetic autonomic neuropathy  · Further cardiac work up as per Cardiology          Benign essential HTN     · Continues to have orthostatic hypertension secondary to diabetic autonomic neuropathy  · Difficult to manage  · Currently not on any antihypertensives due to severe fluctuations in blood pressure          Uncontrolled diabetes mellitus type 2  Diabetic autonomic neuropathy associated with type 2 diabetes mellitus  Diabetic retinopathy     Continue with current dosage of Lantus, sliding scale insulin coverage  HbA1c 9 4  Will hold beta-blockers, midodrine  Consult Endocrinology for stringent blood sugar control       Bilateral lower extremity edema     2D echocardiogram from 03/15 reviewed  Unfortunately because of orthostatic hypotension no specific treatment for now       Diabetic gastroparesis      Stringent blood sugar control is mandatory  Continued Reglan once daily       Morbid obesity     Lifestyle modifications      Diabetic neuropathy, type II diabetes mellitus     Continue with Lantus and sliding scale insulin  Blood sugars reasonable               CONSULTING PROVIDERS   · Cardiology  · Podiatry  · Vascular Surgery    PROCEDURES PERFORMED  03/22/2018: Cardiac catheterization  03/21/2018: NM stress test  03/19/2018: IR guided left lower extremity arteriogram       Xr Foot 3+ Views Left  Result Date: 3/14/2018  Narrative: LEFT FOOT INDICATION:   Left heel gangrenous wound, possible Osteo  COMPARISON:  None  VIEWS:  XR FOOT 3+ VW LEFT FINDINGS: There is no acute fracture or dislocation  No significant degenerative changes  There is posterior calcaneal bone spur formation  No lytic or blastic lesions seen  There is no soft tissue gas  There is no osteomyelitis identified  There is prominent arterial calcification in the ankle and foot  There is swelling over the dorsal aspect of the foot       Impression: No osteomyelitis identified  Soft tissue swelling  Atherosclerosis  Posterior calcaneal bone spur formation  Workstation performed: [id number]     Vas Lower Limb Arterial Duplex, Limited/unilateral    Result Date: 3/6/2018  Narrative:  [facility] REPORT [facility]INICAL: Indications:  Type 2 diabetes mellitus with foot ulcer [E11 621]  Left heel ulceration  Risk Factors: The patient has history of Hyperlipidemia, Hypertension, previous remote smoking, and Diabetes  Right Brachial Pressure:  144/ mm Hg, Left Brachial Pressure:  133/ mm Hg  FINDINGS:  Left                   PSV  EDV  Common Femoral Artery  153   23  Prox Profunda          140   26  Prox SFA               159   33  Mid SFA                125   25  Dist SFA               114   25  Proximal Pop           140   28  Distal Pop             161   39  Dist Post Tibial        45   24  Dist  Ant  Tibial       78   29     CONCLUSION: Impression: Right Lower Limb Ankle Pressure: 188  mm Hg ,  YUDITH: 1 31, normal range  Right Metatarsal Pressure: 172  mm Hg  Right Great Toe Pressure 108  mm Hg ,  above healing threshold for a diabetic  Left Lower Limb Ankle Pressure 245 mm Hg , YUDITH: 1 7, supra normal, consistent with poorly compressible vessels  Left Metatarsal Pressure: 103  mm Hg  Left Great Toe Pressure 51  mm Hg , above healing threshold for a diabetic  Conclusion: Diffuse atherosclerotic disease noted throughout the femoropopliteal arteries  Findings suggests tibioperoneal disease  SIGNATURE: Electronically Signed by: Hermilo Bishop MD, RPVI on 2018-03-06 03:55:50 PM    Vas Lower Limb Vein Mapping Bypass Graft    Result Date: 3/21/2018  Narrative:  [facility] REPORT CLINICAL: Indications: Lower leg vein mapping, pre-op for use as conduit in bypass graft of the left leg    FINDINGS:  Segment         Right   Left                            AP(mm)  AP(mm)  GSV Inguinal       5 9     8 7  GSV Prox Thigh     5 3     4 6  GSV Mid Thigh      3 6 4  0  GSV Dist Thigh     3 7     3 8  GSV Knee           4 6     4 0  GSV Mid Calf               3 8  GSV Ankle                  2 9     CONCLUSION:  Impression: RIGHT LOWER LIMB: The great saphenous vein is patent from the groin to the knee, and not visualized in the calf  The vein is of adequate caliber and quality for graft conduit with intraluminal diameters ranging from 5 9mm to 3 6mm throughout the leg  LEFT LOWER LIMB: The great saphenous vein is patent  from the groin to the ankle  The vein is of adequate caliber and quality for graft conduit with intraluminal diameters ranging from 8 7mm to 2 8 mm throughout the leg  SIGNATURE: Electronically Signed by: Terrell Draper on 2018-03-21 10:15:57 AM    Ir Abdominal Angiography / Intervention    Result Date: 3/21/2018  Narrative: PROCEDURE: Abdominal aortogram and left lower extremity arteriogram  HISTORY: 58-year-old male patient with peripheral arterial disease, hypertension and type II diabetes  He has a nonhealing left foot ulcer  COMMENTS: The patient was identified  The procedure, risks, benefits and alternatives were explained to the patient who expressed understanding and patient signed an informed consent  The patient was placed in the supine position  Real-time ultrasound examination was performed over the right groin, showing patent, pulsatile common femoral artery  A permanent ultrasound image was recorded  Maximum sterile barrier technique including cap, mask, gown and gloves, as well as a large sterile sheet was used  Appropriate hand hygiene was performed  The right groin was prepped using 2% chlorhexidine for cutaneous antisepsis and draped in the usual  sterile fashion  The ultrasound probe was introduced to the field in a sterile sleeve  Sterile ultrasound gel was used  Timeout verification, with all participants present, was performed prior to any intervention  1% lidocaine (10 mL) was used for local anesthesia    Under real-time ultrasound guidance, the right common femoral artery was accessed in retrograde fashion using a 21 gauge micropuncture needle  Using standard needle, guidewire and catheter exchange techniques, a 5 [place] vascular sheath was placed  A 5 [place] omni flush catheter was advanced over a 0 035 inch [employer] guidewire into the abdominal aorta and positioned at the level of T12-L1 for abdominal aortogram  The catheter was repositioned at the level of the aortic bifurcation for pelvic arteriography in both oblique projections  The omni flush catheter was saddled over the aortic bifurcation with the aid of a 0 035 inch angled glidewire  The catheter was advanced into the left common femoral artery and positioned for left lower extremity arteriogram     FINDINGS: - Single patent left renal artery with no significant stenosis  2 patent right renal arteries with no significant stenosis  - No significant aortoiliac atherosclerotic change  - Patent common femoral artery with no significant stenosis, bilaterally  - Patent left profunda femoris artery and branches  - Patent left superficial femoral artery with short segment (2 cm) mild stenosis distally  - Patent above-knee popliteal artery with diffuse atherosclerotic contour irregularity but no significant stenosis  A 5 cm segment of the popliteal artery crossing the knee joint is occluded chronically  The distal popliteal artery is collaterally reconstituted  Just proximal to the takeoff of the anterior tibial artery, the popliteal artery is stenotic  -Three-vessel left lower extremity runoff with the anterior tibial artery dominant  The peroneal and posterior tibial arteries are of small caliber and have multiple diseased segments  The dorsalis pedis artery is patent  The plantar arch is opacified  INTERVENTION: The 5-[place]oz[place]ican short vascular sheath was exchanged over a 0 035 inch Magic torque guidewire for a 6-[place]oz[place] Sera up and over vascular sheath    The tip of the sheath was positioned in the left common femoral artery  Multiple attempts were made to cross the mid popliteal artery segmental occlusion but this was unsuccessful  Follow-up imaging showed no adverse change to the runoff morphology  Vascular sheath removal and hemostasis was accomplished using a Mynx Ace vascular closure device followed by a brief period of manual compression  A sterile dressing was applied  The patient tolerated the procedure well  The findings were discussed with the referring vascular surgeon, Dr Leland Davidson Doctor  Fluoroscopy time: 13 9 minutes  Image count: 223  Contrast: 100 mL diluted Visipaque 320  Estimated blood loss: None  VTe prophylaxis: Short duration procedure not requiring VTe prophylaxis  Complications: None  Medications: IV moderate sedation was utilized for 1 hour and 20 minutes  The patient's cardiorespiratory status was monitored before, during and after the procedure by interventional radiology nursing staff, under my direct supervision  Impression: 1  No significant aortoiliac atherosclerotic disease  2   Patent left superficial femoral artery with mild atherosclerotic changes  2 cm distal segment with mild stenosis  This does not appear to be hemodynamically significant  3   Chronic 5 cm segment occlusion of the popliteal artery, crossing the knee joint  Multiple well-developed collaterals reconstitute the distal popliteal artery  At its distalmost extent there is a short segment stenosis  Attempts to recanalize the popliteal artery were unsuccessful  4   Three-vessel left lower extremity runoff with the anterior tibial artery dominant  Patent dorsalis pedis artery and patent pedal arch  Workstation performed: [id number]       PERTINENT LAB DATAS  Please refer to EPIC system  CULTURES  Blood CS x 2 since 03/15/2018: No growth till date      PHYSICAL EXAM:  Vitals:   Blood Pressure: 152/90 (03/23/18 1145)  Pulse: 82 (03/23/18 1145)  Temperature: 97 8 °F (36 6 °C) (03/23/18 0758)  Temp Source: Temporal (03/23/18 1145)  Respirations: 18 (03/23/18 1145)  Height: 6' 1" (185 4 cm) (03/15/18 1235)  Weight - Scale: 117 kg (258 lb 9 6 oz) (03/15/18 0600)  SpO2: 97 % (03/23/18 1145)  GENERAL: AAO x 3, overweight  HEENT: atraumatic, normocephalic  Oral mucosa moist, no icterus, pallor  PERRLA +  Neck supple, no JVD, no lymphadenopathy, no thryomegaly  CHEST: B/L breath sounds heard, occasional wheezing  CVS: S1, S2  No cyanosis/clubbing or edema  ABDOMEN: Soft/obese/NT/BS heard  NEUROLOGICAL: CN II -XI grossly intact  No focal motor or sensory deficits  No signs of meningeal irritation or cerebellar dysfunction  EXTREMITIES: Bilateral lower extremities edema with chronic mild stasis changes of distal anterior tibial area  Left foot dressing intact  No purulent drainage  Bilateral lower extremities motor and sensory intact   Right groin clear, dry without hematoma, drainage, hemorrhage or pulsatile mass  Discharge Disposition: [facility]  Accepting physician: Dr Fiona Fairbanks MD      Medications   Please see After Visit Summary for reconciled discharge medications provided to patient and family  Diet Orders            Start     Ordered    03/22/18 1058  Diet Cardiac; Cardiac TLC 2 3 GM NA  Diet effective now     Question Answer Comment   Diet Type Cardiac    Cardiac Cardiac TLC 2 3 GM NA    RD to adjust diet per protocol? Yes        03/22/18 1057    03/14/18 1746  Room Service  Once     Question:  Type of Service  Answer:  Room Service-Appropriate    03/14/18 1745          Discharge Condition: stable    Code Status: Level 1 - Full Code  Discharge Statement   I spent 37 minutes discharging the patient  This time was spent on the day of discharge  Greater than 50% of total time was spent with the patient and / or family counseling and / or coordination of care        Peter Da Silva MD  HOSPITALIST SERVICES  3/23/2018    PLEASE NOTE:  This encounter was completed utilizing the [employer] Direct Speech Voice Recognition Software  Grammatical errors, random word insertions, pronoun errors and incomplete sentences are occasional consequences of the system due to software limitations, ambient noise and hardware issues  These may be missed by proof reading prior to affixing electronic signature  Any questions or concerns about the content, text or information contained within the body of this dictation should be directly addressed to the physician for clarification  Please do not hesitate to call me directly if you have any any questions or concerns

## 2018-03-23 NOTE — PROGRESS NOTES
Progress Note - Saranya Palmer 1960, 61 y o  male MRN: [id number]    Unit/Bed#: E4 -01 Encounter: [id number]    Primary Care Provider: Norm Westfall DO   Date and time admitted to hospital: 3/14/2018  2:14 PM        PAD (peripheral artery disease) ([facility] [address] )   Assessment & Plan    PAD w/LLE tissue loss (eschar plantar aspect of left heel), s/p diagnostic LLE angiogram, unsuccessful recanalization L popliteal  (IR) 3/19/2018  --recommend formal surgical revascularization w/L femoral-BK popliteal bypass grafting  --+adequate LLE and right thigh conduit on vein mapping  --+abnormal nuclear stress w/inferolateral infarct w/superimposed ischemia  --+cardiac cath with triple vessel dx; CT surgery consulted for CABG  --continue aspirin and statin  --continue [address] per podiatry  --continue antibiotics per primary service  --timing of LLE bypass surgery pending CABG and healing  --Dr June Gross explained to the patient timing of vascular bypass surgery and patient questions answered  He is in agreement and will wait for further eval from CT surgery and Vascular will follow-up after his CABG  Will sign off for now  He will need to f/u with us after CABG and cardiac rehab  Please call if we can be of further assistance          * Non-healing wound of left heel   Assessment & Plan    L plantar heel unstageable eschar, s/p bedside debridement 3/18/18  --L foot x-ray negative for OM  --continue [address] per Podiatry  --continue Rocephin and Flagyl per primary service  --s/p unsuccessful recannulization attempt mid L popliteal occlusion 3/19/18  --will require surgical revascularization of LLE  --patient to be evaluated by CT Surgery for CABG - triple vessel dx cardiac cath 3/22/18        Atypical chest pain   Assessment & Plan    + inferolateral infarct w/superimposed ischemia on nuclear stress, preserved LV function (EF 55-60%) on TTE  --cardiac catheterization with significant multivessel dx  --plavix load per cardiology  --continue ASA, statin  --pending CT Surgery consult for CABG; LLE revascularization will be determined after CABG        Hyperlipidemia   Assessment & Plan    --Continue statin therapy        Diabetic neuropathy, type II diabetes mellitus ([facility] [address] )   Assessment & Plan    Poorly controlled DM, with recent HgbA1c of 9 4  --optimize BS control for optimization of wound healing and prevention disease  --BS continue to be poorly controlled  --adjust current medical regimen per primary service        Benign essential HTN   Assessment & Plan    --continue current medical regimen per primary service              Subjective:  Patient assessed out of bed to chair  Patient has no new complaints this morning, denies chest pain, shortness of breath, lower extremity rest pain  His vital signs are stable  Dr Ángel Escoto and I looked at the left heel wound, painted with Betadine and redressed the wound  Patient has diabetic neuropathy, with no sensation to bilateral lower extremities  Dr Ángel Escoto explained to the patient the timing of the left lower extremity bypass surgery will be pushed off until cardiac surgery assesses this patient for timing of CABG and intervention is complete  He will need wound healing and optimization after CABG prior to left lower extremity bypass surgery  Patient understood asked Dr Ángel Escoto appropriate questions which were thoroughly explained  He has understanding of the next steps and is waiting for cardiac surgery to determine a plan  Vitals:  /93 (BP Location: Right arm)   Pulse 74   Temp 97 8 °F (36 6 °C) (Temporal)   Resp 18   Ht 6' 1" (1 854 m)   Wt 117 kg (258 lb 9 6 oz)   SpO2 96%   BMI 34 12 kg/m²     I/Os:  I/O last 3 completed shifts:   In: 2380 [I V :2150; IV Piggyback:230]  Out: 8946 [Urine:3425]  I/O this shift:  In: 360 [P O :360]  Out: -     Lab Results and Cultures:   Lab Results   Component Value Date    WBC 7 31 03/22/2018    HGB 11 4 (L) 03/22/2018    HCT 35 0 (L) 03/22/2018    MCV 86 03/22/2018     03/22/2018     Lab Results   Component Value Date    GLUCOSE 172 (H) 03/23/2018    CALCIUM 8 3 03/23/2018     (L) 03/23/2018    K 4 1 03/23/2018    CO2 26 03/23/2018     03/23/2018    BUN 9 03/23/2018    CREATININE 0 88 03/23/2018     Lab Results   Component Value Date    INR 1 06 03/14/2018    PROTIME 13 8 03/14/2018        Blood Culture:   Lab Results   Component Value Date    BLOODCX No Growth After 5 Days  03/15/2018    BLOODCX No Growth After 5 Days  03/15/2018   ,   Urinalysis:   Lab Results   Component Value Date    COLORU Yellow 01/02/2018    CLARITYU Clear 01/02/2018    SPECGRAV 1 025 01/02/2018    PHUR 6 0 01/02/2018    LEUKOCYTESUR (A) 01/02/2018     Elevated glucose may cause decreased leukocyte values   See urine microscopic for [facility] result/    NITRITE Negative 01/02/2018    PROTEINUA Trace (A) 01/02/2018    GLUCOSEU >=1000 (1%) (A) 01/02/2018    KETONESU Negative 01/02/2018    BILIRUBINUR Negative 01/02/2018    BLOODU Negative 01/02/2018   ,   Urine Culture: No results found for: URINECX,   Wound Culure: No results found for: WOUNDCULT    Medications:  Current Facility-Administered Medications   Medication Dose Route Frequency    acetaminophen (TYLENOL) tablet 650 mg  650 mg Oral Q6H PRN    aspirin (ECOTRIN) EC tablet 325 mg  325 mg Oral Daily    atorvastatin (LIPITOR) tablet 40 mg  40 mg Oral Daily    calcium carbonate (TUMS) chewable tablet 500 mg  500 mg Oral Daily PRN    cefTRIAXone (ROCEPHIN) IVPB (premix) 1,000 mg  1,000 mg Intravenous Q24H    collagenase (SANTYL) ointment   Topical Daily    docusate sodium (COLACE) capsule 100 mg  100 mg Oral BID    enoxaparin (LOVENOX) subcutaneous injection 40 mg  40 mg Subcutaneous Daily    insulin glargine (LANTUS) subcutaneous injection 80 Units  80 Units Subcutaneous HS    insulin lispro (HumaLOG) 100 units/mL subcutaneous injection 1-5 Units  1-5 Units Subcutaneous TID AC    lactulose 20 g/30 mL oral solution 20 g  20 g Oral BID    lactulose retention enema 200 g  200 g Rectal BID PRN    LORazepam (ATIVAN) tablet 0 5 mg  0 5 mg Oral BID PRN    metoclopramide (REGLAN) tablet 5 mg  5 mg Oral Daily    metroNIDAZOLE (FLAGYL) IVPB (premix) 500 mg  500 mg Intravenous Q8H    morphine injection 2 mg  2 mg Intravenous Q4H PRN    ondansetron (ZOFRAN) injection 4 mg  4 mg Intravenous Q6H PRN    sodium chloride 0 9 % infusion  100 mL/hr Intravenous Continuous       Imagin2018 Cath lab report reviewed  Physical Exam:    General appearance: alert and oriented, in no acute distress and cooperative  Skin: Skin color, texture, turgor normal  No rashes  Neurologic: Grossly normal  Lungs: clear to auscultation bilaterally  Heart: regular rate and rhythm, S1, S2 normal, no murmur, click, rub or gallop  Abdomen: soft, non-tender; bowel sounds normal; no masses,  no organomegaly  Extremities: Bilateral feet are warm and dry  Left foot ulcer to subcutaneous tissue, painted with Betadine, new dressing applied  Sensory is diminished in bilateral feet  He is motor intact  Wound/Incision:  As above      Pulse exam:  Radial: Right: 2+ Left[de-identified] 2+  Femoral: Right: 2+ Left: 2+  Popliteal: Right: non-palpable Left: non-palpable  DP: Right: non-palpable Left: non-palpable  PT: Right: non-palpable Left: non-palpable      CARLTON Velasco  3/23/2018  [facility]  [phone]

## 2018-03-23 NOTE — CASE MANAGEMENT
Continued Stay Review    Date:  3/22/2018    Vital Signs: /90 (BP Location: Right arm)   Pulse 82   Temp 97 8 °F (36 6 °C) (Temporal)   Resp 18   Ht 6' 1" (1 854 m)   Wt 117 kg (258 lb 9 6 oz)   SpO2 97%   BMI 34 12 kg/m²     Medications:   Scheduled Meds:   Current Facility-Administered Medications:  acetaminophen 650 mg Oral Q6H PRN Radha Slaughter MD    aspirin 325 mg Oral Daily Radha Slaughter MD    atorvastatin 40 mg Oral Daily Radha Slaughter MD    calcium carbonate 500 mg Oral Daily PRN CARLTON Ruiz    cefTRIAXone 1,000 mg Intravenous Q24H Radha Slaughter MD Last Rate: 1,000 mg (03/22/18 1818)   collagenase  Topical Daily Latoya Simmons DPM    docusate sodium 100 mg Oral BID Priti Schulz MD    enoxaparin 40 mg Subcutaneous Daily King Roblero MD    insulin glargine 80 Units Subcutaneous HS Priti Schulz MD    insulin lispro 1-5 Units Subcutaneous TID AC Radha Slaughter MD    lactulose 20 g Oral BID Radha Slaughter MD    lactulose 200 g Rectal BID PRN Radha Slaughter MD    LORazepam 0 5 mg Oral BID PRN Radha Slaughter MD    metoclopramide 5 mg Oral Daily Radha Slaughter MD    metroNIDAZOLE 500 mg Intravenous Q8H Radha Slaughter MD Last Rate: 500 mg (03/23/18 4633)   morphine injection 2 mg Intravenous Q4H PRN Radha Slaughter MD    ondansetron 4 mg Intravenous Q6H PRN Radha Slaughter MD    sodium chloride 100 mL/hr Intravenous Continuous King Roblero MD Last Rate: 100 mL/hr (03/23/18 0401)     Continuous Infusions:   sodium chloride 100 mL/hr Last Rate: 100 mL/hr (03/23/18 0401)     PRN Meds:   acetaminophen    calcium carbonate    lactulose    LORazepam    morphine injection    ondansetron    Abnormal Labs/Diagnostic Results:    H&H 11 4 / 35,    Na 135,   Glu 232  Cardiac Cath 3/22:  Mid LAD: There was a diffuse 70 % stenosis  Proximal circumflex: There was a 80 % stenosis  Mid circumflex: There was a 80 % stenosis  2nd obtuse marginal: There was a 75 % stenosis    Proximal RCA: There was a 99 % stenosis  Mid RCA: There was a 100 % stenosis      CARDIAC STRUCTURES:  Analysis of regional contractile function demonstrated moderate diaphragmatic hypokinesis  Global left ventricular function was normal  EF calculated by contrast ventriculography was 55 %  Age/Sex: 61 y o  male     Assessment/Plan:   Benign essential HTN   Assessment & Plan     Continues to have orthostatic hypertension secondary to diabetic autonomic neuropathy  Difficult to manage  Currently not on any antihypertensives due to severe fluctuations in blood pressure           * Non-healing wound of left heel   Assessment & Plan     Vascular surgery recommends formal surgical revascularization w/L femoral-BK popliteal bypass grafting  Abdominal aortogram with left lower extremity runoff and possible endovascular intervention by IR on 3/19/2018, with unsuccessful attempt to cross chronic occlusion left popliteal artery crossing knee joint ; 3 vessel runoff  Continue with empiric antibiotics, day 8  (IV ceftriaxone plus Flagyl)  DVT has been ruled out  Patient also had lower extremity arterial Dopplers on 03/06/18 which revealed diffuse atherosclerotic disease noted throughout the femoral popliteal arteries suggested of tibioperoneal disease  Xray is negative for any osteomyelitis, , CRP 79 5  Podiatry and vascular sx input on board           PAD (peripheral artery disease) ([facility] )   Assessment & Plan     See above  Continue aspirin, Lipitor           Atypical chest pain   Assessment & Plan     S/P stress test in 03/21: Appears to have inferior lateral infarct with some superimposed ischemia  Cardiology recommends cardiac catheterization  Troponin 0 56, 0 32, 0 43 --  Mild elevation of troponin   Suspicion of CAD  Cardiology on board  Continue with aspirin, statin, morphine as needed for pain  Hold  beta blockers given patient's history of orthostatic hypotension and diabetic autonomic neuropathy    Further cardiac work up as per Cardiology           Diabetic autonomic neuropathy associated with type 2 diabetes mellitus ([facility] [address] )   Assessment & Plan     Continue with Lantus 70 units BID and sliding scale for coverage  HbA1c 9 4  Will hold beta-blockers, midodrine           Bilateral lower extremity edema   Assessment & Plan     2D echocardiogram from 03/15 reviewed  Unfortunately because of orthostatic hypotension no specific treatment for now           Diabetic gastroparesis ([facility] [address] )   Assessment & Plan     Stringent blood sugar control is mandatory  Continued Reglan once daily           Morbid obesity ([facility] [address] )   Assessment & Plan     Lifestyle modifications          Diabetic neuropathy, type II diabetes mellitus ([facility] [address] )   Assessment & Plan     Continue with Lantus and sliding scale insulin  Blood sugars reasonable           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 (HCC)   Assessment & Plan     Blood sugars have been erratic  Will consult Endocrinology for better blood sugar management         Discharge Plan: To Be Determined pending further Cardiac / Vascular Procedures    Thank you,  [facility] in the [facility] by Preston Ty for 2017  Network Utilization Review Department  Phone: [phone]; Fax [phone]  ATTENTION: The Network Utilization Review Department is now centralized for our 7 Facilities  Make a note that we have a new phone and fax numbers for our Department  Please call with any questions or concerns to [phone] and carefully follow the prompts so that you are directed to the right person  All voicemails are confidential  Fax any determinations, approvals, denials, and requests for initial or continue stay review clinical to [phone]   Due to HIGH CALL volume, it would be easier if you could please send faxed requests to expedite your requests and in part, help us provide discharge notifications faster

## 2018-03-23 NOTE — CONSULTS
Consultation - Vascular Surgery   Russel Amin 61 y o  male MRN: [id number]  Unit/Bed#: [facility] 412-01 Encounter: [id number]    Assessment/Plan     Assessment:  Pt is a 57y  o  M with non-healing L foot wounds    Plan:  Recommend L femoral-BK popliteal bypass grafting  Vein mapping completed   Workup for CABG to do done first  ASA/Statin  abx   Local wound care per podiatry  F/u endocrinology  Will sign off until after CABG    History of Present Illness     Russel Amin is a 61 y o  male who has a significant past medical history of uncontrolled diabetes with multiple diabetic adverse effects including peripheral neuropathy, gastroparesis, hypertension, hyperlipidemia presents with left foot wound to the heel and toes with distal deep tissue injury versus necrosis  He was sent in by his primary care provider who had been following along with this heel wound  On arrival to his office yesterday was noted that the heel wound was much larger and may need vascular and Podiatry intervention  Patient reports that this wound on the left heel has been ongoing for about a month  He has no sensation in either of his feet  He does walk at home but does not ambulate much  He reports that he has some financial strain and has been unable to follow up with physicians, take medications appropriately, or get necessary testing done  Recently he had a arterial duplex done which indicated diffuse femoral popliteal artery disease and tibioperoneal disease  This test was done on 03/06/2018 with ABIs noted to be right: 1 31/172/108 and left: 1 7/103/51  Patient denies any pain to left foot  He does report some chest pain and shortness of breath which are new over the past several weeks and primary team is continuing workup  Patient does have palpable femoral pulses bilaterally, and dopplerable signals in bilateral feet      S/P LLE agram 3/19 with 5cm segment of chronic occlusion of left pop artery with unsuccessful attempts to cross, 3 vessel runoff  By IR with S/p cardiac cath 3/22  Transferred to [facility] for evaluation from CT surgery  Currently has no complaints  B/l feet are chronically numb  Inpatient consult to Vascular Surgery     Date/Time 3/23/2018 6:50 PM     Performed by  Neela Carrizales     Authorized by Danita Snellen              Review of Systems   Constitutional: Positive for activity change  Negative for appetite change and fatigue  HENT: Negative  Eyes: Negative  Respiratory: Negative for chest tightness and shortness of breath  Cardiovascular: Positive for leg swelling  Gastrointestinal: Negative  Endocrine: Negative  Genitourinary: Negative  Musculoskeletal: Negative  Negative for arthralgias  Neurological: Positive for numbness  Negative for dizziness  Psychiatric/Behavioral: Negative  Historical Information   Past Medical History:   Diagnosis Date    Anemia     Autonomic neuropathy     Diabetes mellitus ([facility] )     Diabetic autonomic neuropathy associated with type 2 diabetes mellitus ([facility] )     Last Assessed: 12/28/2017    Orthostatic hypotension      No past surgical history on file    Social History   History   Alcohol Use No     History   Drug Use No     History   Smoking Status    Former Smoker    Packs/day: 1 00    Years: 12 00    Quit date: 3/23/1994   Smokeless Tobacco    Never Used     Comment: Former smoker per [employer]     Family History: non-contributory    Meds/Allergies   all current active meds have been reviewed  Allergies   Allergen Reactions    Metformin        Objective   First Vitals:   Blood Pressure: (!) 175/81 (Map 117) (03/23/18 1725)  Pulse: 79 (03/23/18 1725)  Temperature: 97 5 °F (36 4 °C) (03/23/18 1725)  Temp Source: Oral (03/23/18 1725)  Respirations: 16 (03/23/18 1725)  Height: 6' 1" (185 4 cm) (03/23/18 1725)  Weight - Scale: 121 kg (266 lb 8 6 oz) (03/23/18 1725)  SpO2: 99 % (03/23/18 1725)    Current Vitals:   Blood Pressure: (!) 175/81 (Map 117) (03/23/18 1725)  Pulse: 79 (03/23/18 1725)  Temperature: 97 5 °F (36 4 °C) (03/23/18 1725)  Temp Source: Oral (03/23/18 1725)  Respirations: 16 (03/23/18 1725)  Height: 6' 1" (185 4 cm) (03/23/18 1725)  Weight - Scale: 121 kg (266 lb 8 6 oz) (03/23/18 1725)  SpO2: 99 % (03/23/18 1725)    No intake or output data in the 24 hours ending 03/23/18 1850    Invasive Devices     Peripheral Intravenous Line            Peripheral IV 03/22/18 Left Arm 1 day                Physical Exam   Constitutional: He is oriented to person, place, and time  He appears well-developed and well-nourished  obese   HENT:   Head: Normocephalic and atraumatic  Eyes: Pupils are equal, round, and reactive to light  Neck: Normal range of motion  Cardiovascular: Normal rate  Pulmonary/Chest: Effort normal  No respiratory distress  Abdominal: Soft  He exhibits no distension  There is no tenderness  There is no rebound  Musculoskeletal: He exhibits edema  B/l feet numb  B/l dopp DP   Left foot wound dressing c/d/i  Right groin agram site c/d/i  Right arm cath access site bandage c/d/i   Neurological: He is alert and oriented to person, place, and time  Skin: Skin is warm and dry  Psychiatric: He has a normal mood and affect  Lab Results:   I have personally reviewed pertinent lab results  , CBC: No results found for: WBC, HGB, HCT, MCV, PLT, ADJUSTEDWBC, MCH, MCHC, RDW, MPV, NRBC, CMP:   Lab Results   Component Value Date     (L) 03/23/2018    K 4 1 03/23/2018     03/23/2018    CO2 26 03/23/2018    ANIONGAP 8 03/23/2018    BUN 9 03/23/2018    CREATININE 0 88 03/23/2018    GLUCOSE 172 (H) 03/23/2018    CALCIUM 8 3 03/23/2018    EGFR 95 03/23/2018     Imaging: I have personally reviewed pertinent reports  EKG, Pathology, and Other Studies: I have personally reviewed pertinent reports

## 2018-03-23 NOTE — PLAN OF CARE
Problem: Prexisting or High Potential for Compromised Skin Integrity  Goal: Skin integrity is maintained or improved  INTERVENTIONS:  - Identify patients at risk for skin breakdown  - Assess and monitor skin integrity  - Assess and monitor nutrition and hydration status  - Monitor labs (i e  albumin)  - Assess for incontinence   - Turn and reposition patient  - Assist with mobility/ambulation  - Relieve pressure over bony prominences  - Avoid friction and shearing  - Provide appropriate hygiene as needed including keeping skin clean and dry  - Evaluate need for skin moisturizer/barrier cream  - Collaborate with interdisciplinary team (i e  Nutrition, Rehabilitation, etc )   - Patient/family teaching   Outcome: Adequate for Discharge      Problem: Potential for Falls  Goal: Patient will remain free of falls  INTERVENTIONS:  - Assess patient frequently for physical needs  -  Identify cognitive and physical deficits and behaviors that affect risk of falls    -  [place] fall precautions as indicated by assessment   - Educate patient/family on patient safety including physical limitations  - Instruct patient to call for assistance with activity based on assessment  - Modify environment to reduce risk of injury  - Consider OT/PT consult to assist with strengthening/mobility   Outcome: Adequate for Discharge      Problem: INFECTION - ADULT  Goal: Absence or prevention of progression during hospitalization  INTERVENTIONS:  - Assess and monitor for signs and symptoms of infection  - Monitor lab/diagnostic results  - Monitor all insertion sites, i e  indwelling lines, tubes, and drains  - Monitor endotracheal (as able) and nasal secretions for changes in amount and color  - [place] appropriate cooling/warming therapies per order  - Administer medications as ordered  - Instruct and encourage patient and family to use good hand hygiene technique  - Identify and instruct in appropriate isolation precautions for identified infection/condition   Outcome: Adequate for Discharge    Goal: Absence of fever/infection during neutropenic period  INTERVENTIONS:  - Monitor WBC  - Implement neutropenic guidelines   Outcome: Adequate for Discharge      Problem: SAFETY ADULT  Goal: Maintain or return to baseline ADL function  INTERVENTIONS:  -  Assess patient's ability to carry out ADLs; assess patient's baseline for ADL function and identify physical deficits which impact ability to perform ADLs (bathing, care of mouth/teeth, toileting, grooming, dressing, etc )  - Assess/evaluate cause of self-care deficits   - Assess range of motion  - Assess patient's mobility; develop plan if impaired  - Assess patient's need for assistive devices and provide as appropriate  - Encourage maximum independence but intervene and supervise when necessary  ¯ Involve family in performance of ADLs  ¯ Assess for home care needs following discharge   ¯ Request OT consult to assist with ADL evaluation and planning for discharge  ¯ Provide patient education as appropriate   Outcome: Adequate for Discharge    Goal: Maintain or return mobility status to optimal level  INTERVENTIONS:  - Assess patient's baseline mobility status (ambulation, transfers, stairs, etc )    - Identify cognitive and physical deficits and behaviors that affect mobility  - Identify mobility aids required to assist with transfers and/or ambulation (gait belt, sit-to-stand, lift, walker, cane, etc )  - [place] fall precautions as indicated by assessment  - Record patient progress and toleration of activity level on Mobility SBAR; progress patient to next Phase/Stage  - Instruct patient to call for assistance with activity based on assessment  - Request Rehabilitation consult to assist with strengthening/weightbearing, etc    Outcome: Adequate for Discharge      Problem: DISCHARGE PLANNING  Goal: Discharge to home or other facility with appropriate resources  INTERVENTIONS:  - Identify barriers to discharge w/patient and caregiver  - Arrange for needed discharge resources and transportation as appropriate  - Identify discharge learning needs (meds, wound care, etc )  - Arrange for interpretive services to assist at discharge as needed  - Refer to Case Management Department for coordinating discharge planning if the patient needs post-hospital services based on physician/advanced practitioner order or complex needs related to functional status, cognitive ability, or social support system   Outcome: Completed Date Met: 03/23/18      Problem: Knowledge Deficit  Goal: Patient/family/caregiver demonstrates understanding of disease process, treatment plan, medications, and discharge instructions  Complete learning assessment and assess knowledge base    Interventions:  - Provide teaching at level of understanding  - Provide teaching via preferred learning methods   Outcome: Adequate for Discharge      Problem: SKIN/TISSUE INTEGRITY - ADULT  Goal: Skin integrity remains intact  INTERVENTIONS  - Identify patients at risk for skin breakdown  - Assess and monitor skin integrity  - Assess and monitor nutrition and hydration status  - Monitor labs (i e  albumin)  - Assess for incontinence   - Turn and reposition patient  - Assist with mobility/ambulation  - Relieve pressure over bony prominences  - Avoid friction and shearing  - Provide appropriate hygiene as needed including keeping skin clean and dry  - Evaluate need for skin moisturizer/barrier cream  - Collaborate with interdisciplinary team (i e  Nutrition, Rehabilitation, etc )   - Patient/family teaching   Outcome: Adequate for Discharge    Goal: Incision(s), wounds(s) or drain site(s) healing without S/S of infection  INTERVENTIONS  - Assess and document risk factors for skin impairment   - Assess and document dressing, incision, wound bed, drain sites and surrounding tissue  - Initiate Nutrition services consult and/or wound management as needed   Outcome: Adequate for Discharge      Problem: Nutrition/Hydration-ADULT  Goal: Nutrient/Hydration intake appropriate for improving, restoring or maintaining nutritional needs  Monitor and assess patient's nutrition/hydration status for malnutrition (ex- brittle hair, bruises, dry skin, pale skin and conjunctiva, muscle wasting, smooth red tongue, and disorientation)  Collaborate with interdisciplinary team and initiate plan and interventions as ordered  Monitor patient's weight and dietary intake as ordered or per policy  Utilize nutrition screening tool and intervene per policy  Determine patient's food preferences and provide high-protein, high-caloric foods as appropriate       INTERVENTIONS:  - Monitor oral intake, urinary output, labs, and treatment plans  - Assess nutrition and hydration status and recommend course of action  - Evaluate amount of meals eaten  - Assist patient with eating if necessary   - Allow adequate time for meals  - Recommend/ encourage appropriate diets, oral nutritional supplements, and vitamin/mineral supplements  - Order, calculate, and assess calorie counts as needed  - Recommend, monitor, and adjust tube feedings and TPN/PPN based on assessed needs  - Assess need for intravenous fluids  - Provide specific nutrition/hydration education as appropriate  - Include patient/family/caregiver in decisions related to nutrition   Outcome: Adequate for Discharge      Problem: DISCHARGE PLANNING - CARE MANAGEMENT  Goal: Discharge to post-acute care or home with appropriate resources  INTERVENTIONS:  - Conduct assessment to determine patient/family and health care team treatment goals, and need for post-acute services based on payer coverage, community resources, and patient preferences, and barriers to discharge  - Address psychosocial, clinical, and financial barriers to discharge as identified in assessment in conjunction with the patient/family and health care team  - Arrange appropriate level of post-acute services according to patients   needs and preference and payer coverage in collaboration with the physician and health care team  - Communicate with and update the patient/family, physician, and health care team regarding progress on the discharge plan  - Arrange appropriate transportation to post-acute venues   Outcome: Completed Date Met: 03/23/18

## 2018-03-23 NOTE — SOCIAL WORK
Pt needs to be transfer to [facility] for a CABG  Medical Necessity form completed and put in binder to be given to transport and copy put in bin to be scan

## 2018-03-23 NOTE — ASSESSMENT & PLAN NOTE
L plantar heel unstageable eschar, s/p bedside debridement 3/18/18  --L foot x-ray negative for OM  --continue [address] per Podiatry  --continue Rocephin and Flagyl per primary service  --s/p unsuccessful recannulization attempt mid L popliteal occlusion 3/19/18  --will require surgical revascularization of LLE  --patient to be evaluated by CT Surgery for CABG - triple vessel dx cardiac cath 3/22/18

## 2018-03-23 NOTE — PROGRESS NOTES
Progress Note - Cardiology   Yvonne Aguilar 61 y o  male MRN: [id number]  Unit/Bed#: E4 -01 Encounter: [id number]      Assessment:     1  Nonhealing wound of left heel/4th digit DTI with peripheral arterial disease with possible lower extremity bypass planned in the future  2  Severe three-vessel coronary artery disease  3  Probable coronary artery disease with wall motion abnormality seen on echocardiogram-mild elevation of troponin  4  Uncontrolled diabetes  5  Orthostatic hypotension secondary to neuropathy  6   Chronic peripheral edema  7  Dyslipidemia    Discussion/Recommendations:    Discussed with patient and his wife regarding cardiac catheterization findings as well as the need for consultation with cardiothoracic surgery for potential coronary artery bypass grafting if he is deemed a candidate  Would continue aspirin statin  Subjective:  No chest pain or trouble breathing        Physical Exam:  GEN:  NAD  HEENT:  MMM, NCAT, pink conjunctiva, EOMI, nonicteric sclera  CV:  NO JVD/HJR, RR, NO M/R/G, +S1/S2, NO PARASTERNAL HEAVE/THRILL, 1+ LE EDEMA left greater than right, NO HEPATIC SYSTOLIC PULSATION, WARM EXTREMITIES  RESP:  CTAB/L  ABD:  SOFT, NT, NO GROSS ORGANOMEGALY        Vitals:   BP (!) 183/91 (BP Location: Right arm)   Pulse 80   Temp 97 8 °F (36 6 °C) (Temporal)   Resp 18   Ht 6' 1" (1 854 m)   Wt 117 kg (258 lb 9 6 oz)   SpO2 96%   BMI 34 12 kg/m²   Vitals:    03/14/18 1704 03/15/18 0600   Weight: 117 kg (257 lb 4 4 oz) 117 kg (258 lb 9 6 oz)       Intake/Output Summary (Last 24 hours) at 03/23/18 1109  Last data filed at 03/23/18 0900   Gross per 24 hour   Intake             2610 ml   Output             2175 ml   Net              435 ml         TELEMETRY:  No events    Lab Results:    Results from last 7 days  Lab Units 03/22/18  0514   WBC Thousand/uL 7 31   HEMOGLOBIN g/dL 11 4*   HEMATOCRIT % 35 0*   PLATELETS Thousands/uL 271       Results from last 7 days  Lab Units 03/23/18  0549   SODIUM mmol/L 135*   POTASSIUM mmol/L 4 1   CHLORIDE mmol/L 101   CO2 mmol/L 26   BUN mg/dL 9   CREATININE mg/dL 0 88   CALCIUM mg/dL 8 3   GLUCOSE RANDOM mg/dL 172*       Results from last 7 days  Lab Units 03/23/18  0549   SODIUM mmol/L 135*   POTASSIUM mmol/L 4 1   CHLORIDE mmol/L 101   CO2 mmol/L 26   BUN mg/dL 9   CREATININE mg/dL 0 88   GLUCOSE RANDOM mg/dL 172*   CALCIUM mg/dL 8 3             Medications:    Current Facility-Administered Medications:     acetaminophen (TYLENOL) tablet 650 mg, 650 mg, Oral, Q6H PRN, Merary Wang MD    aspirin (ECOTRIN) EC tablet 325 mg, 325 mg, Oral, Daily, Merary Wang MD, 325 mg at 03/23/18 0920    atorvastatin (LIPITOR) tablet 40 mg, 40 mg, Oral, Daily, Merary Wang MD, 40 mg at 03/22/18 1820    calcium carbonate (TUMS) chewable tablet 500 mg, 500 mg, Oral, Daily PRN, CARLTON Johnson, 500 mg at 03/19/18 0106    cefTRIAXone (ROCEPHIN) IVPB (premix) 1,000 mg, 1,000 mg, Intravenous, Q24H, Merary Wang MD, Last Rate: 100 mL/hr at 03/22/18 1818, 1,000 mg at 03/22/18 1818    collagenase (SANTYL) ointment, , Topical, Daily, Latoya Simmons DPM    docusate sodium (COLACE) capsule 100 mg, 100 mg, Oral, BID, Alvina Crabtree MD, 100 mg at 03/22/18 1820    enoxaparin (LOVENOX) subcutaneous injection 40 mg, 40 mg, Subcutaneous, Daily, Joana Lewis MD, 40 mg at 03/23/18 0920    insulin glargine (LANTUS) subcutaneous injection 80 Units, 80 Units, Subcutaneous, HS, Priti Asher MD, 80 Units at 03/22/18 2135    insulin lispro (HumaLOG) 100 units/mL subcutaneous injection 1-5 Units, 1-5 Units, Subcutaneous, TID AC, 1 Units at 03/22/18 1400 **AND** [CANCELED] Fingerstick Glucose (POCT), , , TID AC, Merary Wang MD    lactulose 20 g/30 mL oral solution 20 g, 20 g, Oral, BID, Merary Wang MD, 20 g at 03/18/18 1213    lactulose retention enema 200 g, 200 g, Rectal, BID PRN, Merary Wang MD    LORazepam (ATIVAN) tablet 0 5 mg, 0 5 mg, Oral, BID PRN, Duane Diss, MD    metoclopramide [facility]) tablet 5 mg, 5 mg, Oral, Daily, Duane Diss, MD, 5 mg at 03/23/18 0920    metroNIDAZOLE (FLAGYL) IVPB (premix) 500 mg, 500 mg, Intravenous, Q8H, Duane Diss, MD, Last Rate: 200 mL/hr at 03/23/18 0918, 500 mg at 03/23/18 9785    morphine injection 2 mg, 2 mg, Intravenous, Q4H PRN, Duane Diss, MD    ondansetron [facility]) injection 4 mg, 4 mg, Intravenous, Q6H PRN, Duane Diss, MD    sodium chloride 0 9 % infusion, 100 mL/hr, Intravenous, Continuous, Destinee Romeo MD, Last Rate: 100 mL/hr at 03/23/18 0401, 100 mL/hr at 03/23/18 0401    Portions of the record may have been created with voice recognition software  Occasional wrong word or "sound a like" substitutions may have occurred due to the inherent limitations of voice recognition software  Read the chart carefully and recognize, using context, where substitutions have occurred

## 2018-03-23 NOTE — ASSESSMENT & PLAN NOTE
PAD w/LLE tissue loss (eschar plantar aspect of left heel), s/p diagnostic LLE angiogram, unsuccessful recanalization L popliteal  (IR) 3/19/2018  --recommend formal surgical revascularization w/L femoral-BK popliteal bypass grafting  --+adequate LLE and right thigh conduit on vein mapping  --+abnormal nuclear stress w/inferolateral infarct w/superimposed ischemia  --+cardiac cath with triple vessel dx; CT surgery consulted for CABG  --continue aspirin and statin  --continue [address] per podiatry  --continue antibiotics per primary service  --timing of LLE bypass surgery pending CABG and healing  --Dr Jensen Heading explained to the patient timing of vascular bypass surgery and patient questions answered  He is in agreement and will wait for further eval from CT sugery and Vascular will follow-up after his CABG

## 2018-03-24 ENCOUNTER — APPOINTMENT (INPATIENT)
Dept: RADIOLOGY | Facility: HOSPITAL | Age: 58
DRG: 235 | End: 2018-03-24
Payer: COMMERCIAL

## 2018-03-24 PROBLEM — E11.42 DIABETIC PERIPHERAL NEUROPATHY (HCC): Status: RESOLVED | Noted: 2017-12-28 | Resolved: 2018-03-24

## 2018-03-24 PROBLEM — I50.21 ACUTE SYSTOLIC HEART FAILURE (HCC): Status: ACTIVE | Noted: 2018-03-24

## 2018-03-24 PROBLEM — R60.0 BILATERAL LOWER EXTREMITY EDEMA: Chronic | Status: RESOLVED | Noted: 2018-03-19 | Resolved: 2018-03-24

## 2018-03-24 PROBLEM — I96 GANGRENE (HCC): Status: RESOLVED | Noted: 2018-03-14 | Resolved: 2018-03-24

## 2018-03-24 PROBLEM — IMO0001 CLASS 2 OBESITY DUE TO EXCESS CALORIES WITH SERIOUS COMORBIDITY AND BODY MASS INDEX (BMI) OF 35.0 TO 35.9 IN ADULT: Status: ACTIVE | Noted: 2018-03-19

## 2018-03-24 PROBLEM — R73.9 HYPERGLYCEMIA: Status: RESOLVED | Noted: 2018-03-14 | Resolved: 2018-03-24

## 2018-03-24 PROBLEM — IMO0002 UNCONTROLLED DIABETES MELLITUS TYPE 2 WITH ATHEROSCLEROSIS OF ARTERIES OF EXTREMITIES: Status: RESOLVED | Noted: 2017-12-28 | Resolved: 2018-03-24

## 2018-03-24 PROBLEM — R07.89 ATYPICAL CHEST PAIN: Status: RESOLVED | Noted: 2018-03-16 | Resolved: 2018-03-24

## 2018-03-24 LAB
BILIRUB UR QL STRIP: NEGATIVE
CLARITY UR: CLEAR
COLOR UR: YELLOW
GLUCOSE SERPL-MCNC: 157 MG/DL (ref 65–140)
GLUCOSE SERPL-MCNC: 175 MG/DL (ref 65–140)
GLUCOSE SERPL-MCNC: 184 MG/DL (ref 65–140)
GLUCOSE SERPL-MCNC: 209 MG/DL (ref 65–140)
GLUCOSE UR STRIP-MCNC: ABNORMAL MG/DL
HGB UR QL STRIP.AUTO: NEGATIVE
KETONES UR STRIP-MCNC: NEGATIVE MG/DL
LEUKOCYTE ESTERASE UR QL STRIP: NEGATIVE
NITRITE UR QL STRIP: NEGATIVE
PH UR STRIP.AUTO: 5.5 [PH] (ref 4.5–8)
PROT UR STRIP-MCNC: NEGATIVE MG/DL
SP GR UR STRIP.AUTO: 1.01 (ref 1–1.03)
UROBILINOGEN UR QL STRIP.AUTO: 0.2 E.U./DL

## 2018-03-24 PROCEDURE — 99255 IP/OBS CONSLTJ NEW/EST HI 80: CPT | Performed by: THORACIC SURGERY (CARDIOTHORACIC VASCULAR SURGERY)

## 2018-03-24 PROCEDURE — 87081 CULTURE SCREEN ONLY: CPT | Performed by: PHYSICIAN ASSISTANT

## 2018-03-24 PROCEDURE — 81003 URINALYSIS AUTO W/O SCOPE: CPT | Performed by: PHYSICIAN ASSISTANT

## 2018-03-24 PROCEDURE — 71250 CT THORAX DX C-: CPT

## 2018-03-24 PROCEDURE — 99232 SBSQ HOSP IP/OBS MODERATE 35: CPT | Performed by: INTERNAL MEDICINE

## 2018-03-24 PROCEDURE — 82948 REAGENT STRIP/BLOOD GLUCOSE: CPT

## 2018-03-24 RX ORDER — INSULIN GLARGINE 100 [IU]/ML
60 INJECTION, SOLUTION SUBCUTANEOUS
Status: DISCONTINUED | OUTPATIENT
Start: 2018-03-24 | End: 2018-03-25

## 2018-03-24 RX ORDER — FUROSEMIDE 10 MG/ML
40 INJECTION INTRAMUSCULAR; INTRAVENOUS
Status: DISCONTINUED | OUTPATIENT
Start: 2018-03-24 | End: 2018-03-25

## 2018-03-24 RX ADMIN — LACTULOSE 20 G: 20 SOLUTION ORAL at 17:32

## 2018-03-24 RX ADMIN — ENOXAPARIN SODIUM 40 MG: 40 INJECTION SUBCUTANEOUS at 09:08

## 2018-03-24 RX ADMIN — MUPIROCIN 1 APPLICATION: 20 OINTMENT TOPICAL at 20:14

## 2018-03-24 RX ADMIN — METRONIDAZOLE 500 MG: 500 INJECTION, SOLUTION INTRAVENOUS at 10:00

## 2018-03-24 RX ADMIN — COLLAGENASE SANTYL: 250 OINTMENT TOPICAL at 10:03

## 2018-03-24 RX ADMIN — INSULIN LISPRO 1 UNITS: 100 INJECTION, SOLUTION INTRAVENOUS; SUBCUTANEOUS at 22:08

## 2018-03-24 RX ADMIN — CEFTRIAXONE 1000 MG: 1 INJECTION, SOLUTION INTRAVENOUS at 17:32

## 2018-03-24 RX ADMIN — INSULIN LISPRO 2 UNITS: 100 INJECTION, SOLUTION INTRAVENOUS; SUBCUTANEOUS at 11:25

## 2018-03-24 RX ADMIN — METOCLOPRAMIDE HYDROCHLORIDE 5 MG: 10 TABLET ORAL at 09:09

## 2018-03-24 RX ADMIN — ATORVASTATIN CALCIUM 40 MG: 40 TABLET, FILM COATED ORAL at 17:32

## 2018-03-24 RX ADMIN — DOCUSATE SODIUM 100 MG: 100 CAPSULE, LIQUID FILLED ORAL at 17:32

## 2018-03-24 RX ADMIN — INSULIN LISPRO 2 UNITS: 100 INJECTION, SOLUTION INTRAVENOUS; SUBCUTANEOUS at 16:57

## 2018-03-24 RX ADMIN — LACTULOSE 20 G: 20 SOLUTION ORAL at 09:09

## 2018-03-24 RX ADMIN — INSULIN GLARGINE 60 UNITS: 100 INJECTION, SOLUTION SUBCUTANEOUS at 22:10

## 2018-03-24 RX ADMIN — INSULIN LISPRO 1 UNITS: 100 INJECTION, SOLUTION INTRAVENOUS; SUBCUTANEOUS at 06:58

## 2018-03-24 RX ADMIN — FUROSEMIDE 40 MG: 10 INJECTION, SOLUTION INTRAMUSCULAR; INTRAVENOUS at 16:56

## 2018-03-24 RX ADMIN — METRONIDAZOLE 500 MG: 500 INJECTION, SOLUTION INTRAVENOUS at 17:32

## 2018-03-24 RX ADMIN — METRONIDAZOLE 500 MG: 500 INJECTION, SOLUTION INTRAVENOUS at 03:30

## 2018-03-24 RX ADMIN — DOCUSATE SODIUM 100 MG: 100 CAPSULE, LIQUID FILLED ORAL at 09:09

## 2018-03-24 RX ADMIN — ASPIRIN 325 MG: 325 TABLET ORAL at 09:09

## 2018-03-24 NOTE — CONSULTS
Consultation - Cardiothoracic Surgery   Elmer Tirado 61 y o  male MRN: [id number]  Unit/Bed#: [facility] 412-01 Encounter: [id number]    Physician Requesting Consult: Mayuri Kimball MD    Reason for Consult / Principal Problem: MV CAD    Inpatient consult to Cardiothoracic Surgery  Consult performed by: Tasha Medellin ordered by: Catarina Kraft        History of Present Illness: Elmer Tirado is a 61y o  year old male who presented to [facility] with gangerous L heel  He had the non-healing heel wound for the past month and it was being followed by his PCP  At his PCP visit yesterday, the heel wound was noted to be much larger  He was sent to the ER for concern for necrosis or osteomyelitis  Vascular consult determined it was a popliteal occlusion  He had + troponins in the ER  Cardiac catheterization performed, which showed MV CAD  He was then transferred to [facility] for cardiac surgical evaluation  The patient admits to chest pain and shortness of breath over the past several months  He is easily fatigued and deconditioned  He admits to sleeping in a recliner for the past 6 months, as this is more comfortable than sleeping flat  He reports chronic LE edema  The patient has a PMH of uncontrolled DM2 with neuropathy, retinopathy and gatroparesis, HTN, orthostatic hypotension, HLD, H/O LLE DVT and obesity  He is a former smoker, having quit 25 years ago  He used to smoke 1ppd x 12 years  He does not drink alcohol and denies drug use  He is on disability due to his neuropathy  He is relatively inactive and does not leave home often  He utilizes a walker to assist with ambulation      Past Medical History:  Past Medical History:   Diagnosis Date    Anemia     Autonomic neuropathy     Diabetes mellitus [facility] [address] [facility]     Diabetic autonomic neuropathy associated with type 2 diabetes mellitus ([facility] [address] )     Last Assessed: 12/28/2017    Orthostatic hypotension          Past Surgical History:   No past surgical history on file       Family History:  Family History   Problem Relation Age of Onset    No Known Problems Mother          Social History:  History   Alcohol Use No     History   Drug Use No     History   Smoking Status    Former Smoker    Packs/day: 1 00    Years: 12 00    Quit date: 3/23/1994   Smokeless Tobacco    Never Used     Comment: Former smoker per [employer]     Marital Status: /Civil Union      Home Medications:   Prior to Admission medications    Medication Sig Start Date End Date Taking?  Authorizing Provider   atorvastatin (LIPITOR) 40 mg tablet Take 1 tablet (40 mg total) by mouth daily 2/20/18   Norm Westfall DO   insulin aspart protamine-insulin aspart (NOVOLOG MIX 70/30 FLEXPEN) 100 Units/mL SUPN Inject under the skin Twice daily 2/11/15   Historical Provider, MD   LANTUS SOLOSTAR injection pen 100 units/mL Inject 0 8 mL (80 Units total) under the skin 2 (two) times a day 2/20/18   Norm Westfall DO   LORazepam (ATIVAN) 0 5 mg tablet Take by mouth every 8 (eight) hours as needed for anxiety    Historical Provider, MD   metoclopramide (REGLAN) 5 mg tablet Take 5 mg by mouth daily      Historical Provider, MD   midodrine (PROAMATINE) 5 mg tablet Take 1 tablet (5 mg total) by mouth 3 (three) times a day 2/20/18   Norm Westfall DO   torsemide (DEMADEX) 10 mg tablet Take 1 tablet (10 mg total) by mouth daily 2/21/18   Norm Westfall DO       Inpatient Medications:  Scheduled Meds:   Current Facility-Administered Medications:  acetaminophen 650 mg Oral Q6H PRN Carlie Finley MD    aspirin 325 mg Oral Daily Carlie Finley MD    atorvastatin 40 mg Oral Daily Carlie Finley MD    calcium carbonate 500 mg Oral Daily PRN Carlie Finley MD    cefTRIAXone 1,000 mg Intravenous Q24H Carlie Finley MD Last Rate: 1,000 mg (03/23/18 1948)   collagenase  Topical Daily Carlie Finley MD    docusate sodium 100 mg Oral BID Carlie Finley MD    enoxaparin 40 mg Subcutaneous Daily Carlie Finley MD    insulin glargine 80 Units Subcutaneous HS Katalina Louis MD    insulin lispro 1-5 Units Subcutaneous HS Katalina Louis MD    insulin lispro 1-6 Units Subcutaneous TID Nevin West MD    lactulose 20 g Oral BID Katalina Louis MD    lactulose 200 g Rectal BID PRN Katalina Louis MD    LORazepam 0 5 mg Oral BID PRN Katalina Louis MD    metoclopramide 5 mg Oral Daily Katalina Louis MD    metoprolol 2 5 mg Intravenous Q6H PRN Sahra Moody PA-C    metroNIDAZOLE 500 mg Intravenous Azaela Gonzalez MD Last Rate: Stopped (03/24/18 0400)   morphine injection 2 mg Intravenous Q4H PRN Katalina Louis MD    ondansetron 4 mg Intravenous Q6H PRN Katalina Louis MD      Continuous Infusions:    PRN Meds:    acetaminophen 650 mg Q6H PRN   calcium carbonate 500 mg Daily PRN   lactulose 200 g BID PRN   LORazepam 0 5 mg BID PRN   metoprolol 2 5 mg Q6H PRN   morphine injection 2 mg Q4H PRN   ondansetron 4 mg Q6H PRN       Allergies:   Allergies   Allergen Reactions    Metformin        Review of Systems:  Review of Systems - All ROS negative except as described in HPI  History obtained from chart review and the patient  General ROS: positive for  - chills and fatigue  negative for - fever, night sweats, sleep disturbance, weight gain or weight loss  Respiratory ROS: positive for - shortness of breath, orthopnea  negative for - cough, hemoptysis, tachypnea or wheezing  Cardiovascular ROS: positive for - chest pain, dyspnea on exertion, edema and orthopnea  negative for - irregular heartbeat, loss of consciousness, murmur or paroxysmal nocturnal dyspnea  Gastrointestinal ROS: positive for - constipation, diarrhea and abdominal fullness  negative for - appetite loss, change in stools, heartburn, hematemesis, melena or stool incontinence  Neurological ROS: positive for - gait disturbance and weakness  negative for - memory loss, numbness/tingling, seizures, speech problems or tremors    Vital Signs:     Vitals:    03/23/18 1906 03/24/18 0004 03/24/18 0310 03/24/18 0831   BP: (!) 185/82 137/68 145/70 121/62   BP Location: Right arm Left arm Left arm Right arm   Pulse: 79 72 75 74   Resp: 18 18 18 18   Temp: 98 1 °F (36 7 °C) 98 °F (36 7 °C) 97 5 °F (36 4 °C) 97 8 °F (36 6 °C)   TempSrc: Oral Oral Oral Oral   SpO2: 95% 95% 97% 98%   Weight:       Height:         Invasive Devices     Peripheral Intravenous Line            Peripheral IV 03/22/18 Left Arm 2 days                Physical Exam:    HEENT/NECK:  PERRLA  No jugular venous distention  Cardiac:Regular rate and rhythm  Pulmonary:  Breath sounds clear bilaterally  Abdomen:  Non-tender, Non-distended  Positive bowel sounds  Lower extremities: Extremities warm/dry  Radial/PT/DP pules 2+ bilaterally  2+ edema B/L  L foot bandages C/D/I  Neuro: Alert and oriented X 3  Sensation is grossly intact  No focal deficits  Skin: Warm/Dry, without rashes or lesions  Lab Results:       Results from last 7 days  Lab Units 03/22/18  0514 03/20/18  0455 03/19/18  0556   WBC Thousand/uL 7 31 7 98 8 27   HEMOGLOBIN g/dL 11 4* 10 8* 11 7*   HEMATOCRIT % 35 0* 33 9* 35 4*   PLATELETS Thousands/uL 271 270 271       Results from last 7 days  Lab Units 03/23/18  0549 03/22/18  0514 03/20/18  0455   SODIUM mmol/L 135* 135* 135*   POTASSIUM mmol/L 4 1 4 1 4 2   CHLORIDE mmol/L 101 100 99*   CO2 mmol/L 26 26 25   BUN mg/dL 9 11 12   CREATININE mg/dL 0 88 0 90 1 00   GLUCOSE RANDOM mg/dL 172* 232* 221*   CALCIUM mg/dL 8 3 8 4 8 8         Lab Results   Component Value Date    HGBA1C 9 4 (H) 03/14/2018     Lab Results   Component Value Date    TROPONINI 0 43 (H) 03/14/2018   Blood Cultures 3/15 - negative  Cholesterol 85, Trig 64, HDL 33, LDL 39    Imaging Studies:     Cardiac Catheterization: EF 55% with moderate diaphragmatic hypokinesis  Mid LAD: There was a diffuse 70 % stenosis  Proximal circumflex: There was a 80 % stenosis  Mid circumflex: There was a 80 % stenosis    2nd obtuse marginal: There was a 75 % stenosis  Proximal RCA: There was a 99 % stenosis  Mid RCA: There was a 100 % stenosis    Echocardiogram: LEFT VENTRICLE:  Size was at the upper limits of normal   Systolic function was normal  Ejection fraction was estimated in the range of 55 % to 60 %  There was moderate hypokinesis of the basal inferior wall(s)  Wall thickness was at the upper limits of normal    MITRAL VALVE: There was trace regurgitation    TRICUSPID VALVE: There was trace regurgitation    PULMONIC VALVE: There was trace regurgitation    Nuc Stress:  Stress results: There was no chest pain during stress  -  ECG conclusions: The stress ECG was non-diagnostic due to resting ST/T wave abnormality   -  Perfusion imaging: There was a large, moderately severe, partially reversible myocardial perfusion defect of the entire inferolateral wall  -  Gated SPECT: The calculated left ventricular ejection fraction was 45 %  Left ventricular ejection fraction was mildly decreased by visual estimate  There was moderately reduced myocardial thickening and motion of the inferior wall and  lateral wall of the left ventricle  Vein mapping: R GSV 5 9 - 3 6mm  GSV not visualized in the calf  L GSV 8 7 - 2 9mm    X ray left foot: No osteomyelitis identified  Soft tissue swelling  Atherosclerosis  Posterior calcaneal bone spur formation      I have personally reviewed pertinent films in PACS    Assessment:  Patient Active Problem List    Diagnosis Date Noted    Triple vessel coronary artery disease 03/23/2018    Diabetic gastroparesis ([address] ) 03/19/2018    Bilateral lower extremity edema 03/19/2018    Morbid obesity ([address] ) 03/19/2018    Atypical chest pain 03/16/2018    Orthostatic hypotension 03/16/2018    PAD (peripheral artery disease) ([address] ) 03/14/2018    Gangrene ([address] ) 03/14/2018    Hyperglycemia 03/14/2018    T wave inversion in EKG 03/14/2018    Non-healing wound of left heel 03/14/2018    Benign essential HTN 01/11/2018    Anxiety and depression 12/28/2017    Diabetic peripheral neuropathy ([facility] ) 12/28/2017    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 ([place] [address] ) 12/28/2017    Diabetic autonomic neuropathy associated with type 2 diabetes mellitus ([place] [address] ) 04/11/2017    Vitamin D deficiency 09/20/2016    Hyperlipidemia 02/11/2015    Diabetic neuropathy, type II diabetes mellitus ([place] [address] ) 11/06/2014     Severe coronary artery disease; Ongoing CABG workup    Plan:  Risks and benefits of coronary artery bypass grafting were discussed in detail today with the patient  They understand and wish to proceed with further workup and ultimately surgical intervention  We have ordered routine preoperative laboratory and vascular studies  Pending the results of these tests, they will be scheduled for surgery early this week with KEVIN Graff  Vascular surgery recommends a left femoral BK popliteal bypass graft for the treatment of his femoral popliteal and tibioperoneal disease  Podiatry is following as well  Kennedy Manzanares was comfortable with our recommendations, and their questions were answered to their satisfaction  We will continue to evaluate the patient daily with further recommendations as work up is completed  Thank you for allowing us to participate in the care of this patient       SIGNATURE: Everett Miller PA-C  DATE: March 24, 2018  TIME: 9:44 AM

## 2018-03-24 NOTE — ASSESSMENT & PLAN NOTE
· Local care as per Podiatry  · Physical surgery evaluated the patient, possible left F-BK grafting bypass after coronary artery bypass, vascular surgery signed off the patient to be consulted once cardiac issue is resolved

## 2018-03-24 NOTE — PROGRESS NOTES
Progress Note - Tri Valera 1960, 61 y o  male MRN: [id number]    Unit/Bed#: [facility] 412-01 Encounter: [id number]    Primary Care Provider: Marcus Shipman DO   Date and time admitted to hospital: 3/23/2018  5:21 PM        Class 2 obesity due to excess calories with serious comorbidity and body mass index (BMI) of 35 0 to 35 9 in adult   Assessment & Plan    · Discuss with primary care physician options for weight loss        Non-healing wound of left heel   Assessment & Plan    · Local care as per Podiatry  · Physical surgery evaluated the patient, possible left F-BK grafting bypass after coronary artery bypass, vascular surgery signed off the patient to be consulted once cardiac issue is resolved        PAD (peripheral artery disease) ([facility] [place] [facility] )   Assessment & Plan    · As above        Diabetic autonomic neuropathy associated with type 2 diabetes mellitus ([facility] [place] [facility] )   Assessment & Plan    · A1c about 7 days ago 9 4, diabetes uncontrolled with multiple complications, vascular and neurological  · Appreciated endocrinologist input, insulin adjustment a been noted  · Monitor closely        Benign essential HTN   Assessment & Plan    · Continue with metoprolol p r n , so far patient has not required any inpatient agent on a schedule fashion  · Monitor         Anxiety and depression   Assessment & Plan    · Continue with p r n  Ativan  · mood appears stable        * Triple vessel coronary artery disease   Assessment & Plan    · Patient was admitted in [facility] for chest pain and nonhealing wound of the left lower extremity  · Underwent cardiac catheterization, found to have triple-vessel disease  · Currently undergoing cardiothoracic surgery evaluation for coronary artery bypass, follow recommendations  · Continue with current medical management including aspirin, statin          Bilateral lower extremity edema, suspected acute systolic CHF, last ejection fraction on stress test 45%  2D echo with ejection fraction 55%    Started daily weight, I&Os, Lasix 40 mg IV b i d   Consult heart failure team       VTE Pharmacologic Prophylaxis: yes  Pharmacologic: Enoxaparin (Lovenox)  Mechanical VTE Prophylaxis in Place: Yes    Patient Centered Rounds: I have performed bedside rounds with nursing staff today  Discussions with Specialists or Other Care Team Provider:  Cardiothoracic surgery    Education and Discussions with Family / Patient:  Patient    Time Spent for Care: 45 minutes  More than 50% of total time spent on counseling and coordination of care as described above  Current Length of Stay: 1 day(s)    Current Patient Status: Inpatient   Certification Statement: The patient will continue to require additional inpatient hospital stay due to Please refer to above    Discharge Plan: To be determined, likely rehabilitation    Code Status: Level 1 - Full Code      Subjective:   Patient is feeling relatively speaking well  He has no immediate complaints  Is awaiting for final determination about possible coronary artery bypass  He is also anxious about the fact that after coronary artery bypass he might undergo left fem-pop  Overall he feels a little bit discouraged  Objective:     Vitals:   Temp (24hrs), Av 7 °F (36 5 °C), Min:97 5 °F (36 4 °C), Max:98 1 °F (36 7 °C)    HR:  [72-82] 75  Resp:  [16-18] 18  BP: (121-185)/(62-87) 130/67  SpO2:  [95 %-99 %] 98 %  Body mass index is 35 17 kg/m²  Input and Output Summary (last 24 hours): Intake/Output Summary (Last 24 hours) at 18 1455  Last data filed at 18 1447   Gross per 24 hour   Intake             1350 ml   Output             1700 ml   Net             -350 ml       Physical Exam:     Physical Exam   Constitutional: He is oriented to person, place, and time  He appears well-developed  Cardiovascular: Normal rate, regular rhythm and normal heart sounds  Exam reveals no friction rub  No murmur heard    Pulmonary/Chest: Effort normal  No respiratory distress  He has no wheezes  He has no rales  Abdominal: Soft  He exhibits no distension  There is no tenderness  There is no rebound  Musculoskeletal: He exhibits edema (Plus two bilateral lower extremities)  Neurological: He is alert and oriented to person, place, and time  He exhibits normal muscle tone  Skin: Skin is warm  Psychiatric: He has a normal mood and affect  Additional Data:     Labs:      Results from last 7 days  Lab Units 03/22/18  0514  03/19/18  0556   WBC Thousand/uL 7 31  < > 8 27   HEMOGLOBIN g/dL 11 4*  < > 11 7*   HEMATOCRIT % 35 0*  < > 35 4*   PLATELETS Thousands/uL 271  < > 271   NEUTROS PCT %  --   --  72   LYMPHS PCT %  --   --  18   MONOS PCT %  --   --  7   EOS PCT %  --   --  2   < > = values in this interval not displayed  Results from last 7 days  Lab Units 03/23/18  0549   SODIUM mmol/L 135*   POTASSIUM mmol/L 4 1   CHLORIDE mmol/L 101   CO2 mmol/L 26   BUN mg/dL 9   CREATININE mg/dL 0 88   CALCIUM mg/dL 8 3   GLUCOSE RANDOM mg/dL 172*           * I Have Reviewed All Lab Data Listed Above  * Additional Pertinent Lab Tests Reviewed:  All Labs Within Last 24 Hours Reviewed    Imaging:    Imaging Reports Reviewed Today Include:  Multiple images done during this admission  Imaging Personally Reviewed by Myself Includes:  None    Recent Cultures (last 7 days):           Last 24 Hours Medication List:     Current Facility-Administered Medications:  acetaminophen 650 mg Oral Q6H PRN Wilmer Priest MD    aspirin 325 mg Oral Daily Wilmer Priest MD    atorvastatin 40 mg Oral Daily Wilmer Priest MD    calcium carbonate 500 mg Oral Daily PRN Wilmer Priest MD    cefTRIAXone 1,000 mg Intravenous Q24H Wilmer Priest MD Last Rate: 1,000 mg (03/23/18 1948)   collagenase  Topical Daily Wilmer Priest MD    docusate sodium 100 mg Oral BID Wilmer Priest MD    enoxaparin 40 mg Subcutaneous Daily Wilmer Priest MD    insulin glargine 60 Units Subcutaneous HS Claui Rodríguez Nelson MD    insulin lispro 1-6 Units Subcutaneous HS Shari Gracia MD    [START ON 3/25/2018] insulin lispro 1-6 Units Subcutaneous 0200 Shari Gracia MD    insulin lispro 2-12 Units Subcutaneous TID AC Shari Gracia MD    [START ON 3/25/2018] insulin lispro 20 Units Subcutaneous TID With Meals Shari Gracia MD    lactulose 20 g Oral BID Meghann Rolon MD    lactulose 200 g Rectal BID PRN Meghann Rolon MD    LORazepam 0 5 mg Oral BID PRN Meghann Rolon MD    metoclopramide 5 mg Oral Daily Meghann Rolon MD    metoprolol 2 5 mg Intravenous Q6H PRN Leobardo Harris PA-C    metroNIDAZOLE 500 mg Intravenous Micheal Salazar MD Last Rate: 500 mg (03/24/18 1000)   morphine injection 2 mg Intravenous Q4H PRN Meghann Rolon MD    ondansetron 4 mg Intravenous Q6H PRN Meghann Rolon MD         Today, Patient Was Seen By: Illona Hashimoto, MD    ** Please Note: Dragon 360 Dictation voice to text software may have been used in the creation of this document   **

## 2018-03-24 NOTE — ASSESSMENT & PLAN NOTE
· A1c about 7 days ago 9 4, diabetes uncontrolled with multiple complications, vascular and neurological  · Appreciated endocrinologist input, insulin adjustment a been noted  · Monitor closely

## 2018-03-24 NOTE — CONSULTS
Consultation - Gayatri Henderson 61 y o  male MRN: [id number]    Unit/Bed#: [facility] 412-01 Encounter: [id number]      Assessment/Plan     Assessment: This is a 61y o -year-old male with diabetes with hyperglycemia  Blood sugars in 200 range, plan for CABG next week,  Goal for blood sugars  mg per dL    Plan:  - start Humalog 20 units with meals, starting from tomorrow, as patient received 80 units of Lantus bedtime  -start Humalog scale with algorithm 4 with meals and at bedtime  -reduce Lantus to 60 units at bedtime  -hypoglycemia protocol if blood sugar less than 70  -discussed with patient about importance of checking blood sugars as well as taking insulin as directed, to keep blood sugars controlled, and A1c at goal of 7% to prevent further complications   -discussed that he will need appointment to follow-up with Endocrinology in 2 weeks after discharge    Will continue to follow and make adjustments  Thank you for consulting Endocrinology, please do not hesitate to call if you have any questions    CC: Diabetes Consult    History of Present Illness     HPI: Gayatri Henderson is a 61y o  year old male with type 2 diabetes, uncontrolled, history of noncompliance, multiple complications, such as coronary artery disease, peripheral artery disease, on healing wound on left foot, neuropathy, and retinopathy, was admitted for nonhealing wound on left foot for months, to rule out osteomyelitis  Workup showed  Occlusion of left popliteal artery, plan is to do peripheral bypass, after cardiac surgery  Most likely patient will be scheduled for CABG next week  Diabetes consult was called for adjustment of insulin regimen during the hospital stay  Patient has longstanding history of diabetes for 20 years, diabetes course over the years is unstable  He takes Lantus 70 units twice a day at home, does not check blood sugars as he has difficulty using glucometer because of dexterity issues    At home whenever he checks blood sugars they are usually in 200-300 range  Blood sugars uncontrolled the hospital   Patient is getting Lantus 80 units at bedtime, and Humalog scale with meals  Lab Results   Component Value Date    HGBA1C 9 4 (H) 03/14/2018         pInpatient consult to Endocrinology  Consult performed by: Ezra Barkley ordered by: Natalie Foley          Review of Systems   Constitutional: Positive for activity change, appetite change and fatigue  HENT: Negative  Eyes: Positive for visual disturbance  Respiratory: Positive for chest tightness and shortness of breath  Cardiovascular: Positive for leg swelling  Negative for palpitations  Gastrointestinal: Positive for abdominal distention and constipation  Endocrine: Positive for polydipsia and polyphagia  Negative for cold intolerance and heat intolerance  Genitourinary: Negative  Musculoskeletal: Positive for myalgias  Allergic/Immunologic: Negative  Neurological: Negative  Hematological: Negative  Psychiatric/Behavioral: Negative          Historical Information   Past Medical History:   Diagnosis Date    Anemia     Anxiety and depression     Autonomic neuropathy     Cataract     [facility]     Diabetic autonomic neuropathy associated with type 2 diabetes mellitus ([facility] )     Last Assessed: 12/28/2017    Gastroparesis due to DM (HCC)     History of DVT (deep vein thrombosis)     left LE    HTN (hypertension)     Hyperlipidemia     Non healing left heel wound     Obesity     Orthostatic hypotension      Past Surgical History:   Procedure Laterality Date    ROTATOR CUFF REPAIR Bilateral      Social History   History   Alcohol Use No     History   Drug Use No     History   Smoking Status    Former Smoker    Packs/day: 1 00    Years: 12 00    Types: Cigarettes    Quit date: 3/23/1994   Smokeless Tobacco    Never Used     Family History:   Family History   Problem Relation Age of Onset    Atrial fibrillation Mother     Stroke Mother     Hypertension Father     Heart attack Paternal Grandfather 61       Meds/Allergies   Current Facility-Administered Medications   Medication Dose Route Frequency Provider Last Rate Last Dose    acetaminophen (TYLENOL) tablet 650 mg  650 mg Oral Q6H PRN Brenda Schmitz MD        aspirin tablet 325 mg  325 mg Oral Daily Brenda Schmitz MD   325 mg at 03/24/18 0611    atorvastatin (LIPITOR) tablet 40 mg  40 mg Oral Daily Brenda Schmitz MD   40 mg at 03/23/18 1935    calcium carbonate (TUMS) chewable tablet 500 mg  500 mg Oral Daily PRN Brenda Schmitz MD        cefTRIAXone (ROCEPHIN) IVPB (premix) 1,000 mg  1,000 mg Intravenous Q24H Brenda Schmitz  mL/hr at 03/23/18 1948 1,000 mg at 03/23/18 1948    collagenase (SANTYL) ointment   Topical Daily Brenda Schmitz MD        docusate sodium (COLACE) capsule 100 mg  100 mg Oral BID Brenda Schmitz MD   100 mg at 03/24/18 0909    enoxaparin (LOVENOX) subcutaneous injection 40 mg  40 mg Subcutaneous Daily Brenda Schmitz MD   40 mg at 03/24/18 0908    insulin glargine (LANTUS) subcutaneous injection 80 Units  80 Units Subcutaneous HS Brenda Schmitz MD   80 Units at 03/23/18 2207    insulin lispro (HumaLOG) 100 units/mL subcutaneous injection 1-5 Units  1-5 Units Subcutaneous HS Brenda Schmitz MD   1 Units at 03/23/18 2206    insulin lispro (HumaLOG) 100 units/mL subcutaneous injection 1-6 Units  1-6 Units Subcutaneous TID [facility] Brenda Schmitz MD   2 Units at 03/24/18 1125    lactulose 20 g/30 mL oral solution 20 g  20 g Oral BID Brenda Schmitz MD   20 g at 03/24/18 0909    lactulose retention enema 200 g  200 g Rectal BID PRN Brenda Schmitz MD        LORazepam (ATIVAN) tablet 0 5 mg  0 5 mg Oral BID PRN Brenda Schmitz MD        metoclopramide (REGLAN) tablet 5 mg  5 mg Oral Daily Brenda Schmitz MD   5 mg at 03/24/18 0909    metoprolol (LOPRESSOR) injection 2 5 mg  2 5 mg Intravenous Q6H PRN Agapito Sanchez PA-C        metroNIDAZOLE (FLAGYL) IVPB (premix) 500 mg  500 mg Intravenous Rajeev Carrillo  mL/hr at 03/24/18 1000 500 mg at 03/24/18 1000    morphine injection 2 mg  2 mg Intravenous Q4H PRN Miriam Sykes MD        ondansetron (ZOFRAN) injection 4 mg  4 mg Intravenous Q6H PRN Miriam Sykes MD         Allergies   Allergen Reactions    Metformin        Objective   Vitals: Blood pressure 130/67, pulse 75, temperature 97 5 °F (36 4 °C), temperature source Oral, resp  rate 18, height 6' 1" (1 854 m), weight 121 kg (266 lb 8 6 oz), SpO2 98 %  Intake/Output Summary (Last 24 hours) at 03/24/18 1242  Last data filed at 03/24/18 0831   Gross per 24 hour   Intake              400 ml   Output             1700 ml   Net            -1300 ml     Invasive Devices     Peripheral Intravenous Line            Peripheral IV 03/22/18 Left Arm 2 days                Physical Exam   Constitutional: He is oriented to person, place, and time  He appears well-developed and well-nourished  No distress  HENT:   Head: Normocephalic and atraumatic  Eyes: Conjunctivae and EOM are normal  Pupils are equal, round, and reactive to light  Right eye exhibits no discharge  Left eye exhibits no discharge  Neck: Normal range of motion  Neck supple  No thyromegaly present  Cardiovascular: Normal rate, regular rhythm, normal heart sounds and intact distal pulses  Exam reveals no friction rub  No murmur heard  Pulmonary/Chest: Effort normal and breath sounds normal  No respiratory distress  He has no wheezes  He has no rales  He exhibits no tenderness  Abdominal: Soft  Bowel sounds are normal  He exhibits no distension  There is no tenderness  Obese   Musculoskeletal: Normal range of motion  He exhibits no edema, tenderness or deformity  Left foot is in dressing and cast   Lymphadenopathy:     He has no cervical adenopathy  Neurological: He is alert and oriented to person, place, and time  He has normal reflexes     Skin: Skin is warm and dry  No rash noted  He is not diaphoretic  There is erythema (Left foot)  No pallor  Psychiatric: He has a normal mood and affect  His behavior is normal    Vitals reviewed  The history was obtained from the review of the chart, patient  Lab Results:       Lab Results   Component Value Date    WBC 7 31 03/22/2018    HGB 11 4 (L) 03/22/2018    HCT 35 0 (L) 03/22/2018    MCV 86 03/22/2018     03/22/2018     Lab Results   Component Value Date/Time    BUN 9 03/23/2018 05:49 AM    BUN 19 09/13/2016 09:24 AM     (L) 03/23/2018 05:49 AM     09/13/2016 09:24 AM    K 4 1 03/23/2018 05:49 AM    K 5 2 09/13/2016 09:24 AM     03/23/2018 05:49 AM    CL 96 (L) 09/13/2016 09:24 AM    CO2 26 03/23/2018 05:49 AM    CO2 26 09/13/2016 09:24 AM    CREATININE 0 88 03/23/2018 05:49 AM    CREATININE 1 03 09/13/2016 09:24 AM    AST 22 03/15/2018 05:46 AM    AST 22 09/13/2016 09:24 AM    ALT 20 03/15/2018 05:46 AM    ALT 29 09/13/2016 09:24 AM    ALB 2 5 (L) 03/15/2018 05:46 AM    ALB 4 0 09/13/2016 09:24 AM     No results for input(s): CHOL, HDL, LDL, TRIG, VLDL in the last 72 hours  No results found for: [place]  POC Glucose   Date Value   03/24/2018 209 mg/dl (H)   03/24/2018 157 mg/dl (H)   03/23/2018 173 mg/dl (H)   03/22/2018 183 mg/dl (H)   03/22/2018 223 mg/dl (H)   03/21/2018 345 mg/dl (H)   03/21/2018 283 mg/dl (H)   03/21/2018 198 mg/dl (H)   03/21/2018 220 mg/dl (H)   03/21/2018 174 mg/dl (H)   01/11/2018 130   09/07/2016 84       Imaging Studies: I have personally reviewed pertinent reports  FINDINGS:     There is no acute fracture or dislocation      No significant degenerative changes  There is posterior calcaneal bone spur formation  No lytic or blastic lesions seen      There is no soft tissue gas      There is no osteomyelitis identified      There is prominent arterial calcification in the ankle and foot    There is swelling over the dorsal aspect of the foot      IMPRESSION:     No osteomyelitis identified  Soft tissue swelling  Atherosclerosis  Posterior calcaneal bone spur formation  Portions of the record may have been created with voice recognition software

## 2018-03-24 NOTE — ASSESSMENT & PLAN NOTE
· Continue with metoprolol p r n , so far patient has not required any inpatient agent on a schedule fashion  · Monitor

## 2018-03-24 NOTE — CONSULTS
Consult - Podiatry   Connor Vuong 61 y o  male MRN: [id number]  Unit/Bed#: [facility] 412-01 Encounter: [id number]    Assessment/Plan     Assessment/Plan:  1   Left foot plantar heel eschar with resolving cellulitis secondary to diabetes mellitus with neuropathy, s/p left heel excisional debridement at bedside (Date of procedure: 3/18/18) by Dr Conchita Fonseca   2  Left fourth digit DTI  3  DM type 2 with neuropathy-A1c: 9 4%  4  Peripheral arterial disease     Plan:  -left heel is stable with no crepitus, no fluctuance, no acute signs of infection:  Left heel dressed with santayl, DSD  -DTI distal tip left 4th digit painted with betadine   -podiatry to continue to follow while patient in-house for monitoring and dressing changes   -prior radiograph left foot negative for osteomyelitis  -s/p agram with unsuccessful attempt to cross chronic occlusion left popliteal artery crossing knee joint ; 3 vessel runoff  Vascular recommending left femoral-BKA popliteal bypass after CABG is performed   -patient to continue with ceftriaxone/Flagyl per Internal Medicine  -c/w prevalon boots to B/L lower extremity as well to prevent developing pressure wounds to this extremity; patient understanding of such; PT/OT/CM following for discharge planning   - WBS: NWB LLE  -medical management per primary team    History of Present Illness     HPI:  Connor Vuong is a 61 y o  male who presents with left plantar heel wound  He was being followed by Podiatry at [address] [address]  He had a bedside heel excisional debridement on 03/18/2018 by Dr Conchita Fonseca  He was transferred from [place] [address]  to [place] [address]  for a CABG procedure, and a left lower extremity bypass  He states he has been wearing the Prevalon boots, and offloading the heel as much as possible  He denies any recent nausea, vomiting, fever, chills, shortness of breath, or chest pains      Consults  Review of Systems   Constitutional: Negative  HENT: Negative  Eyes: Negative  Respiratory: Negative  Cardiovascular: Negative  Gastrointestinal: Negative  Musculoskeletal:  Negative  Skin left heel wound   Neurological: Negative  Psych: Negative  Historical Information   Past Medical History:   Diagnosis Date    Anemia     Autonomic neuropathy     Diabetes mellitus [facility]     Diabetic autonomic neuropathy associated with type 2 diabetes mellitus ([facility] )     Last Assessed: 12/28/2017    Orthostatic hypotension      No past surgical history on file    Social History   History   Alcohol Use No     History   Drug Use No     History   Smoking Status    Former Smoker    Packs/day: 1 00    Years: 12 00    Quit date: 3/23/1994   Smokeless Tobacco    Never Used     Comment: Former smoker per [employer]     Family History:   Family History   Problem Relation Age of Onset    No Known Problems Mother        Meds/Allergies   Facility-Administered Medications Prior to Admission   Medication    silver sulfadiazine (SILVADENE,SSD) 1 % cream     Prescriptions Prior to Admission   Medication    atorvastatin (LIPITOR) 40 mg tablet    insulin aspart protamine-insulin aspart (NOVOLOG MIX 70/30 FLEXPEN) 100 Units/mL SUPN    LANTUS SOLOSTAR injection pen 100 units/mL    LORazepam (ATIVAN) 0 5 mg tablet    metoclopramide (REGLAN) 5 mg tablet    midodrine (PROAMATINE) 5 mg tablet    torsemide (DEMADEX) 10 mg tablet     Allergies   Allergen Reactions    Metformin        Objective   First Vitals:   Blood Pressure: (!) 175/81 (Map 117) (03/23/18 1725)  Pulse: 79 (03/23/18 1725)  Temperature: 97 5 °F (36 4 °C) (03/23/18 1725)  Temp Source: Oral (03/23/18 1725)  Respirations: 16 (03/23/18 1725)  Height: 6' 1" (185 4 cm) (03/23/18 1725)  Weight - Scale: 121 kg (266 lb 8 6 oz) (03/23/18 1725)  SpO2: 99 % (03/23/18 1725)    Current Vitals:   Blood Pressure: 121/62 (Map 86) (03/24/18 0831)  Pulse: 74 (03/24/18 0831)  Temperature: 97 8 °F (36 6 °C) (03/24/18 0831)  Temp Source: Oral (03/24/18 0831)  Respirations: 18 (03/24/18 0831)  Height: 6' 1" (185 4 cm) (03/23/18 1725)  Weight - Scale: 121 kg (266 lb 8 6 oz) (03/23/18 1725)  SpO2: 98 % (03/24/18 0831)        /62 (BP Location: Right arm) Comment: Map 86  Pulse 74   Temp 97 8 °F (36 6 °C) (Oral)   Resp 18   Ht 6' 1" (1 854 m)   Wt 121 kg (266 lb 8 6 oz)   SpO2 98%   BMI 35 17 kg/m²      General Appearance:    Alert, cooperative, no distress   Head:    Normocephalic, without obvious abnormality, atraumatic   Eyes:    PERRL, conjunctiva/corneas clear, EOM's intact        Nose:   Moist mucous membranes   Neck:   Supple, symmetrical, trachea midline   Back:     Symmetric   Lungs:     Respirations unlabored   Heart:    Regular rate and rhythm, S1 and S2 normal, no murmur, rub   or gallop   Abdomen:     Soft, non-tender   Extremities:   MMT is 4/5 to all compartments of the LE, +1/4 edema B/L, No pain on palpation noted bilaterally  No calf tenderness noted bilaterally  Pulses:   R DP is +0/4, R PT is +0/4, L DP is +0/4, L PT is +0/4, CFT< 3sec to all digits  Pedal hair is Absent   Skin:   Left plantar heel wound noted  Wound is a mixture of necrotic/fibrotic in appearance  No clinical signs of infection noted; no ascending cellulitis, no active drainage  No crepitus felt upon expression around the heel  Does not probe to bone  Wound measures approximately 5 0 x 5 0 x 0 1 cm  Left 4th digit eschar noted  Eschar is firmly attached, with no clinical signs of infection  Neurologic:   Gross sensation is Intact  Protective sensation is Absent       Left heel wound      Left digits          Lab Results:   Admission on 03/23/2018   Component Date Value    POC Glucose 03/23/2018 173*    POC Glucose 03/24/2018 157*                   Invalid input(s): LABAEARO            Imaging: I have personally reviewed pertinent films in PACS  EKG, Pathology, and Other Studies: I have personally reviewed pertinent reports

## 2018-03-24 NOTE — SOCIAL WORK
60yo male with CAD, DM, left foot cellulitis  Plan for CABG on 3/27/18  He is alert and oriented, independent ADLS but does not drive, does not work  Uses rollator and has SPC and quad cane  States his wife works at [employer], fulltime and parttime jobs and they are in poor financial position  He receives disability  Pt's PCP is Dr Maribel Mitchell but he has not sought treatment for his left foot or leg  Plan is for fem bypass after CABG  Pt  States he has a daughter in [place] and a son at [employer]  Wife is Everett Simon at [phone]  Denies hx of [address] or rehab,  He uses [facility] in [place]  Denies hx mental illness and D&A, noted hx depression and anxiety in H&P  He has no POA  Pt is willing to go to [employer] or [facility] if in network  Plan may be better for short term snf rehab until able to bear weight  Requested PT/OT maury  CM following  CM reviewed d/c planning process including the following: identifying help at home, patient preference for d/c planning needs, Discharge Lounge, [facility] Meds to Bed program, availability of treatment team to discuss questions or concerns patient and/or family may have regarding understanding medications and recognizing signs and symptoms once discharged  CM also encouraged patient to follow up with all recommended appointments after discharge  Patient advised of importance for patient and family to participate in managing patients medical well being

## 2018-03-24 NOTE — ASSESSMENT & PLAN NOTE
· Patient was admitted in [facility] for chest pain and nonhealing wound of the left lower extremity  · Underwent cardiac catheterization, found to have triple-vessel disease  · Currently undergoing cardiothoracic surgery evaluation for coronary artery bypass, follow recommendations  · Continue with current medical management including aspirin, statin

## 2018-03-24 NOTE — CASE MANAGEMENT
Initial Clinical Review    Thank you,  [facility] [facility] in the [facility] by Preston Ty for 2017  Network Utilization Review Department  Phone: [phone]; Fax [phone]  ATTENTION: The Network Utilization Review Department is now centralized for our 7 Facilities  Make a note that we have a new phone and fax numbers for our Department  Please call with any questions or concerns to [phone] and carefully follow the prompts so that you are directed to the right person  All voicemails are confidential  Fax any determinations, approvals, denials, and requests for initial or continue stay review clinical to [phone]  Due to HIGH CALL volume, it would be easier if you could please send faxed requests to expedite your requests and in part, help us provide discharge notifications faster  Admission: Date/Time/Statement: 3/23/18 @ 1721     Orders Placed This Encounter   Procedures    Inpatient Admission     Standing Status:   Standing     Number of Occurrences:   1     Order Specific Question:   Admitting Physician     Answer:   Rachel Vargas     Order Specific Question:   Level of Care     Answer:   Med Surg [16]     Order Specific Question:   Estimated length of stay     Answer:   More than 2 Midnights     Order Specific Question:   Certification     Answer:   I certify that inpatient services are medically necessary for this patient for a duration of greater than two midnights  See H&P and MD Progress Notes for additional information about the patient's course of treatment  ED: Date/Time/Mode of Arrival:  Tx from [address] [place] [address] 3/23    Chief Complaint: Abnormal cardiac catheterization    History of Illness:  61 y o  male who presents with abnormal cardiac catheterization showing triple-vessel disease  The patient initially presented to [facility] on 03/14/2018 with left lower extremity wound and chest pain    Patient has a history significant for type 2 diabetes which is poorly controlled(HBAic 9 4)  He also has history of diabetic gastroparesis, severe peripheral neuropathy secondary to diabetes, hyperlipidemia, and chronic left lower extremity/heel ulcer  During his stay at [place] [address] he also had non ST elevation MI for which he underwent cardiology evaluation  He had a positive stress test and underwent cardiac catheterization today which showed severe triple-vessel disease  He was transferred here for cardiothoracic surgery evaluation  During his stay in the hospital the patient was worked up for his chronic left heel ulcer  He had an abdominal aortogram with unsuccessful attempt to bypass occlusion of left popliteal artery  Vascular surgery recommended formal surgical revascularization with fem popliteal bypass after cardiac issues were resolved  Patient was maintained on intravenous antibiotics(ceftriaxone and Flagyl day# 9 )  Patient was kept of beta-blocker secondary to multiple episodes of hypotension which was thought to be secondary to diabetic autonomic neuropathy  He was maintained on insulin for uncontrolled diabetes  Vital Signs:  03/23 0701  03/24 0700 03/24 0701  03/24 1639  Most Recent     Temperature (°F) 97  598 1 97 598 4  98 4 (36 9)    Pulse 7282 7475  75    Respirations 1618 18  18    Blood Pressure 137/68185/82 121/62136/70  136/70    SpO2 (%) 9599 9698  96        Abnormal Labs/Diagnostic Test Results:   Lab Units 03/22/18  0514   03/19/18  0556   WBC Thousand/uL 7 31  < > 8 27   HEMOGLOBIN g/dL 11 4*  < > 11 7*   HEMATOCRIT % 35 0*  < > 35 4*   PLATELETS Thousands/uL 271  < > 271   NEUTROS PCT %  --   --  72   LYMPHS PCT %  --   --  18   MONOS PCT %  --   --  7   EOS PCT %  --   --  2     Lab Units 03/23/18  0549   SODIUM mmol/L 135*   POTASSIUM mmol/L 4 1   CHLORIDE mmol/L 101   CO2 mmol/L 26   BUN mg/dL 9   CREATININE mg/dL 0 88   CALCIUM mg/dL 8 3   GLUCOSE RANDOM mg/dL 172*          Past Medical/Surgical History:   Past Medical History:   Diagnosis Date    Anemia     Anxiety and depression     Autonomic neuropathy     Cataract     Diabetes mellitus ([facility] [address] )     Diabetic autonomic neuropathy associated with type 2 diabetes mellitus (HCC)     Gastroparesis due to DM (HCC)     History of DVT (deep vein thrombosis)     HTN (hypertension)     Hyperlipidemia     Non healing left heel wound     Obesity     Orthostatic hypotension        Admitting Diagnosis: CAD (coronary artery disease) [I25 10]    Age/Sex: 61 y o  male    Assessment/Plan:    Hospital Problem List:      Principal Problem:    Triple vessel coronary artery disease  Active Problems:    Anxiety and depression    Benign essential HTN    Diabetic autonomic neuropathy associated with type 2 diabetes mellitus (HCC)    Hyperlipidemia    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 (HCC)    PAD (peripheral artery disease) ([facility])    Non-healing wound of left heel    Morbid obesity ([facility])      Plan for the Primary Problem(s):  · Severe triple-vessel coronary artery disease by cardiac catheterization  Continue with aspirin and statin therapy  Cardiothoracic surgery consultation will be obtained for assessment of coronary artery bypass graft grafting  Nuclear scan revealed left ventricular ejection fraction at 45%   · Nonhealing left heel ulcer per severe underlying peripheral arterial disease  Recent left lower extremity arteriogram reveals extensive popliteal disease  Patient will need vascular intervention once cardiac status is stable  Vascular surgery consultation will be obtained  Patient currently on IV antibiotics  Continue with wound care  · Uncontrolled type 2 diabetes with severe peripheral neuropathy  Endocrine consultation will be obtained  Continue with current insulin regimen  · Chronic peripheral edema likely secondary to chronic congestive heart failure  Diuretics on hold in view of recent cardiac catheterization  · Dyslipidemia  Continue with statin therapy  · Hypertension  Patient has been off antihypertensive medication secondary to multiple episodes of orthostatic hypotension the likely secondary to underlying diabetic autonomic neuropathy  Continue monitor blood pressure closely and treat with antihypertensive medications for sustained systolic blood [id number]>196EK Hg  ·   VTE Prophylaxis: Enoxaparin (Lovenox)  /   Code Status: FULL CODEPOLST: POLST form is not discussed and not completed at this time      Anticipated Length of Stay:  Patient will be admitted on an Inpatient basis with an anticipated length of stay of  > 2 midnights     Justification for Hospital Stay:  cardiothoracic surgery evaluation forcoronary artery bypass surgery      Admission Orders:  Admit to Tele unit  Telem  Consult CT surgery, vascular surgery, heart failure service, podiatry, endocrinology  Fingerstick glucose checks qid w/ ssi  SCD's  Cardiac diet  PT/OT evals    Scheduled Meds:   Current Facility-Administered Medications:  acetaminophen 650 mg Oral Q6H PRN Lala Kocher, MD    aspirin 325 mg Oral Daily Lala Kocher, MD    atorvastatin 40 mg Oral Daily Lala Kocher, MD    calcium carbonate 500 mg Oral Daily PRN Lala Kocher, MD    cefTRIAXone 1,000 mg Intravenous Q24H Lala Kocher, MD Last Rate: 1,000 mg (03/23/18 1948)   collagenase  Topical Daily Lala Kocher, MD    docusate sodium 100 mg Oral BID Lala Kocher, MD    enoxaparin 40 mg Subcutaneous Daily Lala Kocher, MD    furosemide 40 mg Intravenous BID (diuretic) Mayco Tilley MD    insulin glargine 60 Units Subcutaneous HS Catherine Valera MD    insulin lispro 1-6 Units Subcutaneous HS Catherine Valera MD    [START ON 3/25/2018] insulin lispro 1-6 Units Subcutaneous 0200 Catherine Valera MD    insulin lispro 2-12 Units Subcutaneous TID AC Catherine Valera MD    [START ON 3/25/2018] insulin lispro 20 Units Subcutaneous TID With Meals Shari Gracia MD    lactulose 20 g Oral BID Mehgann Rolon MD    lactulose 200 g Rectal BID PRN Meghann Rolon MD    LORazepam 0 5 mg Oral BID PRN Meghann Rolon MD    metoclopramide 5 mg Oral Daily Meghann Rolon MD    metoprolol 2 5 mg Intravenous Q6H PRN Leobardo Harris PA-C    metroNIDAZOLE 500 mg Intravenous Micheal Salazar MD Last Rate: 500 mg (03/24/18 1000)   morphine injection 2 mg Intravenous Q4H PRN Meghann Rolon MD    mupirocin 1 application Nasal [id number] [address] Malissa Sheppard PA-C    ondansetron 4 mg Intravenous Q6H PRN Meghann Rolon MD      Continuous Infusions:    PRN Meds:   acetaminophen    calcium carbonate    lactulose    LORazepam    metoprolol    morphine injection    ondansetron    Vascular surgery consult 3/23 --   Assessment:  Pt is a 57y  o  M with non-healing L foot wounds     Plan:  Recommend L femoral-BK popliteal bypass grafting  Vein mapping completed   Workup for CABG to do done first  ASA/Statin  abx   Local wound care per podiatry  F/u endocrinology  Will sign off until after CABG    CT surgery consult 3/24 --  Plan:  Risks and benefits of coronary artery bypass grafting were discussed in detail today with the patient  They understand and wish to proceed with further workup and ultimately surgical intervention  We have ordered routine preoperative laboratory and vascular studies  Pending the results of these tests, they will be scheduled for surgery early this week with KEVIN Berumen      Vascular surgery recommends a left femoral BK popliteal bypass graft for the treatment of his femoral popliteal and tibioperoneal disease   Podiatry is following as well

## 2018-03-25 ENCOUNTER — APPOINTMENT (INPATIENT)
Dept: NON INVASIVE DIAGNOSTICS | Facility: HOSPITAL | Age: 58
DRG: 235 | End: 2018-03-25
Payer: COMMERCIAL

## 2018-03-25 PROBLEM — I50.33 ACUTE ON CHRONIC DIASTOLIC HEART FAILURE (HCC): Status: ACTIVE | Noted: 2018-03-24

## 2018-03-25 PROBLEM — I21.A1 NON-ST ELEVATION MYOCARDIAL INFARCTION (NSTEMI), TYPE 2: Status: ACTIVE | Noted: 2018-03-25

## 2018-03-25 LAB
ABO GROUP BLD: NORMAL
ANION GAP SERPL CALCULATED.3IONS-SCNC: 4 MMOL/L (ref 4–13)
APTT PPP: 29 SECONDS (ref 23–35)
BASOPHILS # BLD AUTO: 0.05 THOUSANDS/ΜL (ref 0–0.1)
BASOPHILS NFR BLD AUTO: 1 % (ref 0–1)
BLD GP AB SCN SERPL QL: NEGATIVE
BUN SERPL-MCNC: 12 MG/DL (ref 5–25)
CALCIUM SERPL-MCNC: 8.9 MG/DL (ref 8.3–10.1)
CHLORIDE SERPL-SCNC: 100 MMOL/L (ref 100–108)
CO2 SERPL-SCNC: 29 MMOL/L (ref 21–32)
CREAT SERPL-MCNC: 0.89 MG/DL (ref 0.6–1.3)
EOSINOPHIL # BLD AUTO: 0.26 THOUSAND/ΜL (ref 0–0.61)
EOSINOPHIL NFR BLD AUTO: 3 % (ref 0–6)
ERYTHROCYTE [DISTWIDTH] IN BLOOD BY AUTOMATED COUNT: 13.8 % (ref 11.6–15.1)
GFR SERPL CREATININE-BSD FRML MDRD: 94 ML/MIN/1.73SQ M
GLUCOSE SERPL-MCNC: 110 MG/DL (ref 65–140)
GLUCOSE SERPL-MCNC: 178 MG/DL (ref 65–140)
GLUCOSE SERPL-MCNC: 181 MG/DL (ref 65–140)
GLUCOSE SERPL-MCNC: 208 MG/DL (ref 65–140)
GLUCOSE SERPL-MCNC: 214 MG/DL (ref 65–140)
GLUCOSE SERPL-MCNC: 97 MG/DL (ref 65–140)
HCT VFR BLD AUTO: 36.4 % (ref 36.5–49.3)
HGB BLD-MCNC: 11.9 G/DL (ref 12–17)
INR PPP: 1.06 (ref 0.86–1.16)
LYMPHOCYTES # BLD AUTO: 1.91 THOUSANDS/ΜL (ref 0.6–4.47)
LYMPHOCYTES NFR BLD AUTO: 22 % (ref 14–44)
MAGNESIUM SERPL-MCNC: 2.1 MG/DL (ref 1.6–2.6)
MCH RBC QN AUTO: 27.7 PG (ref 26.8–34.3)
MCHC RBC AUTO-ENTMCNC: 32.7 G/DL (ref 31.4–37.4)
MCV RBC AUTO: 85 FL (ref 82–98)
MONOCYTES # BLD AUTO: 0.6 THOUSAND/ΜL (ref 0.17–1.22)
MONOCYTES NFR BLD AUTO: 7 % (ref 4–12)
NEUTROPHILS # BLD AUTO: 5.87 THOUSANDS/ΜL (ref 1.85–7.62)
NEUTS SEG NFR BLD AUTO: 67 % (ref 43–75)
NRBC BLD AUTO-RTO: 0 /100 WBCS
PHOSPHATE SERPL-MCNC: 3.8 MG/DL (ref 2.7–4.5)
PLATELET # BLD AUTO: 291 THOUSANDS/UL (ref 149–390)
PMV BLD AUTO: 9.7 FL (ref 8.9–12.7)
POTASSIUM SERPL-SCNC: 4.3 MMOL/L (ref 3.5–5.3)
PROTHROMBIN TIME: 13.8 SECONDS (ref 12.1–14.4)
RBC # BLD AUTO: 4.3 MILLION/UL (ref 3.88–5.62)
RH BLD: POSITIVE
SODIUM SERPL-SCNC: 133 MMOL/L (ref 136–145)
SPECIMEN EXPIRATION DATE: NORMAL
WBC # BLD AUTO: 8.71 THOUSAND/UL (ref 4.31–10.16)

## 2018-03-25 PROCEDURE — 86920 COMPATIBILITY TEST SPIN: CPT

## 2018-03-25 PROCEDURE — 86901 BLOOD TYPING SEROLOGIC RH(D): CPT | Performed by: PHYSICIAN ASSISTANT

## 2018-03-25 PROCEDURE — 97163 PT EVAL HIGH COMPLEX 45 MIN: CPT

## 2018-03-25 PROCEDURE — G8979 MOBILITY GOAL STATUS: HCPCS

## 2018-03-25 PROCEDURE — 83735 ASSAY OF MAGNESIUM: CPT | Performed by: INTERNAL MEDICINE

## 2018-03-25 PROCEDURE — 99232 SBSQ HOSP IP/OBS MODERATE 35: CPT | Performed by: INTERNAL MEDICINE

## 2018-03-25 PROCEDURE — 86850 RBC ANTIBODY SCREEN: CPT | Performed by: PHYSICIAN ASSISTANT

## 2018-03-25 PROCEDURE — 80048 BASIC METABOLIC PNL TOTAL CA: CPT | Performed by: INTERNAL MEDICINE

## 2018-03-25 PROCEDURE — 85025 COMPLETE CBC W/AUTO DIFF WBC: CPT | Performed by: INTERNAL MEDICINE

## 2018-03-25 PROCEDURE — 84100 ASSAY OF PHOSPHORUS: CPT | Performed by: INTERNAL MEDICINE

## 2018-03-25 PROCEDURE — 85730 THROMBOPLASTIN TIME PARTIAL: CPT | Performed by: PHYSICIAN ASSISTANT

## 2018-03-25 PROCEDURE — 99233 SBSQ HOSP IP/OBS HIGH 50: CPT | Performed by: INTERNAL MEDICINE

## 2018-03-25 PROCEDURE — 85610 PROTHROMBIN TIME: CPT | Performed by: PHYSICIAN ASSISTANT

## 2018-03-25 PROCEDURE — G8978 MOBILITY CURRENT STATUS: HCPCS

## 2018-03-25 PROCEDURE — 86900 BLOOD TYPING SEROLOGIC ABO: CPT | Performed by: PHYSICIAN ASSISTANT

## 2018-03-25 PROCEDURE — 82948 REAGENT STRIP/BLOOD GLUCOSE: CPT

## 2018-03-25 RX ORDER — INSULIN GLARGINE 100 [IU]/ML
65 INJECTION, SOLUTION SUBCUTANEOUS
Status: DISCONTINUED | OUTPATIENT
Start: 2018-03-25 | End: 2018-03-27

## 2018-03-25 RX ORDER — LACTULOSE 20 G/30ML
20 SOLUTION ORAL 4 TIMES DAILY
Status: DISCONTINUED | OUTPATIENT
Start: 2018-03-25 | End: 2018-03-28 | Stop reason: HOSPADM

## 2018-03-25 RX ORDER — BISACODYL 10 MG
10 SUPPOSITORY, RECTAL RECTAL DAILY PRN
Status: DISCONTINUED | OUTPATIENT
Start: 2018-03-25 | End: 2018-03-28 | Stop reason: HOSPADM

## 2018-03-25 RX ORDER — FUROSEMIDE 10 MG/ML
80 INJECTION INTRAMUSCULAR; INTRAVENOUS
Status: DISCONTINUED | OUTPATIENT
Start: 2018-03-25 | End: 2018-03-25

## 2018-03-25 RX ORDER — FUROSEMIDE 10 MG/ML
80 INJECTION INTRAMUSCULAR; INTRAVENOUS
Status: DISCONTINUED | OUTPATIENT
Start: 2018-03-25 | End: 2018-03-26

## 2018-03-25 RX ADMIN — FUROSEMIDE 80 MG: 10 INJECTION, SOLUTION INTRAMUSCULAR; INTRAVENOUS at 11:56

## 2018-03-25 RX ADMIN — INSULIN LISPRO 20 UNITS: 100 INJECTION, SOLUTION INTRAVENOUS; SUBCUTANEOUS at 11:57

## 2018-03-25 RX ADMIN — INSULIN LISPRO 20 UNITS: 100 INJECTION, SOLUTION INTRAVENOUS; SUBCUTANEOUS at 08:21

## 2018-03-25 RX ADMIN — ATORVASTATIN CALCIUM 40 MG: 40 TABLET, FILM COATED ORAL at 17:34

## 2018-03-25 RX ADMIN — LACTULOSE 20 G: 20 SOLUTION ORAL at 11:56

## 2018-03-25 RX ADMIN — BISACODYL 10 MG: 10 SUPPOSITORY RECTAL at 16:13

## 2018-03-25 RX ADMIN — MUPIROCIN 1 APPLICATION: 20 OINTMENT TOPICAL at 08:21

## 2018-03-25 RX ADMIN — MUPIROCIN 1 APPLICATION: 20 OINTMENT TOPICAL at 21:40

## 2018-03-25 RX ADMIN — COLLAGENASE SANTYL: 250 OINTMENT TOPICAL at 10:02

## 2018-03-25 RX ADMIN — ENOXAPARIN SODIUM 40 MG: 40 INJECTION SUBCUTANEOUS at 08:19

## 2018-03-25 RX ADMIN — INSULIN GLARGINE 65 UNITS: 100 INJECTION, SOLUTION SUBCUTANEOUS at 21:40

## 2018-03-25 RX ADMIN — CEFTRIAXONE 1000 MG: 1 INJECTION, SOLUTION INTRAVENOUS at 19:17

## 2018-03-25 RX ADMIN — ASPIRIN 325 MG: 325 TABLET ORAL at 08:20

## 2018-03-25 RX ADMIN — LACTULOSE 20 G: 20 SOLUTION ORAL at 21:40

## 2018-03-25 RX ADMIN — DOCUSATE SODIUM 100 MG: 100 CAPSULE, LIQUID FILLED ORAL at 17:34

## 2018-03-25 RX ADMIN — INSULIN LISPRO 2 UNITS: 100 INJECTION, SOLUTION INTRAVENOUS; SUBCUTANEOUS at 06:27

## 2018-03-25 RX ADMIN — METRONIDAZOLE 500 MG: 500 INJECTION, SOLUTION INTRAVENOUS at 10:17

## 2018-03-25 RX ADMIN — INSULIN LISPRO 2 UNITS: 100 INJECTION, SOLUTION INTRAVENOUS; SUBCUTANEOUS at 02:30

## 2018-03-25 RX ADMIN — METRONIDAZOLE 500 MG: 500 INJECTION, SOLUTION INTRAVENOUS at 17:34

## 2018-03-25 RX ADMIN — METOCLOPRAMIDE HYDROCHLORIDE 5 MG: 10 TABLET ORAL at 08:20

## 2018-03-25 RX ADMIN — FUROSEMIDE 80 MG: 10 INJECTION, SOLUTION INTRAMUSCULAR; INTRAVENOUS at 17:34

## 2018-03-25 RX ADMIN — LACTULOSE 20 G: 20 SOLUTION ORAL at 08:20

## 2018-03-25 RX ADMIN — LACTULOSE 20 G: 20 SOLUTION ORAL at 17:34

## 2018-03-25 RX ADMIN — INSULIN LISPRO 4 UNITS: 100 INJECTION, SOLUTION INTRAVENOUS; SUBCUTANEOUS at 11:57

## 2018-03-25 RX ADMIN — DOCUSATE SODIUM 100 MG: 100 CAPSULE, LIQUID FILLED ORAL at 08:20

## 2018-03-25 RX ADMIN — METRONIDAZOLE 500 MG: 500 INJECTION, SOLUTION INTRAVENOUS at 02:35

## 2018-03-25 RX ADMIN — FUROSEMIDE 40 MG: 10 INJECTION, SOLUTION INTRAMUSCULAR; INTRAVENOUS at 08:19

## 2018-03-25 NOTE — ASSESSMENT & PLAN NOTE
· Continue with metoprolol IV p r n , so far patient has not required any inpatient agent on a scheduled fashion  · Monitor

## 2018-03-25 NOTE — PROGRESS NOTES
Progress Note - Cardiothoracic Surgery   Peterson Moore 61 y o  male MRN: [id number]  Unit/Bed#: [facility] 412-01 Encounter: [id number]      24 Hour Events: No events overnight  Pt feels well, denies CP or SOB      Medications:   Scheduled Meds:  Current Facility-Administered Medications:  acetaminophen 650 mg Oral Q6H PRN Jessica Bernstein MD    aspirin 325 mg Oral Daily Jessica Bernstein MD    atorvastatin 40 mg Oral Daily Jessica Bernstein MD    bisacodyl 10 mg Rectal Daily PRN Radha Carlton MD    calcium carbonate 500 mg Oral Daily PRN Jessica Bernstein MD    cefTRIAXone 1,000 mg Intravenous Q24H Jessica Bernstein MD Last Rate: 1,000 mg (03/24/18 1732)   collagenase  Topical Daily Jessica Bernstein MD    docusate sodium 100 mg Oral BID Jessica Bernstein MD    enoxaparin 40 mg Subcutaneous Daily Jessica Bernstein MD    furosemide 80 mg Intravenous TID (diuretic) Radha Carlton MD    insulin glargine 65 Units Subcutaneous HS Tasha George MD    insulin lispro 1-6 Units Subcutaneous HS Tasha George MD    insulin lispro 1-6 Units Subcutaneous 0200 Tasha George MD    insulin lispro 2-12 Units Subcutaneous TID AC Tasha George MD    insulin lispro 24 Units Subcutaneous TID With Meals Tasha George MD    lactulose 20 g Oral 4x Daily Radha Carlton MD    lactulose 200 g Rectal BID PRN Jessica Bernstein MD    LORazepam 0 5 mg Oral BID PRN Jessica Bernstein MD    metoclopramide 5 mg Oral Daily Jessica Bernstein MD    metoprolol 2 5 mg Intravenous Q6H PRN Pio Enriquez PA-C    metroNIDAZOLE 500 mg Intravenous Ivy Billings MD Last Rate: Stopped (03/25/18 1117)   morphine injection 2 mg Intravenous Q4H PRN Jessica Bernstein MD    mupirocin 1 application Nasal F98Y [facility] Malissa Sheppard PA-C    ondansetron 4 mg Intravenous Q6H PRN Jessica Bernstein MD      Continuous Infusions:     Results:     Results from last 7 days  Lab Units 03/25/18  0544 03/22/18  0514 03/20/18  0455   WBC Thousand/uL 8 71 7 31 7 98 HEMOGLOBIN g/dL 11 9* 11 4* 10 8*   HEMATOCRIT % 36 4* 35 0* 33 9*   PLATELETS Thousands/uL 291 271 270       Results from last 7 days  Lab Units 03/25/18  0544 03/23/18  0549 03/22/18  0514   SODIUM mmol/L 133* 135* 135*   POTASSIUM mmol/L 4 3 4 1 4 1   CHLORIDE mmol/L 100 101 100   CO2 mmol/L 29 26 26   BUN mg/dL 12 9 11   CREATININE mg/dL 0 89 0 88 0 90   GLUCOSE RANDOM mg/dL 178* 172* 232*   CALCIUM mg/dL 8 9 8 3 8 4       Results from last 7 days  Lab Units 03/25/18  0544   INR  1 06   PTT seconds 29   Blood Cultures 3/15 - negative  Cholesterol 85, Trig 64, HDL 33, LDL 39  UA - negative  MRSA - pending    Studies:   Cardiac Catheterization: EF 55% with moderate diaphragmatic hypokinesis  Mid LAD: There was a diffuse 70 % stenosis  Proximal circumflex: There was a 80 % stenosis  Mid circumflex: There was a 80 % stenosis  2nd obtuse marginal: There was a 75 % stenosis  Proximal RCA: There was a 99 % stenosis  Mid RCA: There was a 100 % stenosis     Echocardiogram: LEFT VENTRICLE:  Size was at the upper limits of normal   Systolic function was normal  Ejection fraction was estimated in the range of 55 % to 60 %  There was moderate hypokinesis of the basal inferior wall(s)  Wall thickness was at the upper limits of normal    MITRAL VALVE: There was trace regurgitation    TRICUSPID VALVE: There was trace regurgitation    PULMONIC VALVE: There was trace regurgitation     Nuc Stress:  Stress results: There was no chest pain during stress  -  ECG conclusions: The stress ECG was non-diagnostic due to resting ST/T wave abnormality  -  Perfusion imaging: There was a large, moderately severe, partially reversible myocardial perfusion defect of the entire inferolateral wall  -  Gated SPECT: The calculated left ventricular ejection fraction was 45 %  Left ventricular ejection fraction was mildly decreased by visual estimate   There was moderately reduced myocardial thickening and motion of the inferior wall and  lateral wall of the left ventricle      Vein mapping: R GSV 5 9 - 3 6mm  GSV not visualized in the calf  L GSV 8 7 - 2 9mm     X ray left foot: No osteomyelitis identified   Soft tissue swelling   Atherosclerosis   Posterior calcaneal bone spur formation      Carotid artery duplex: pending    CT chest - There are extensive atherosclerotic calcifications in the coronary arteries  There is minimal atherosclerotic calcifications in the thoracic aorta  I have personally reviewed pertinent films in PACS    Vitals:   Vitals:    03/25/18 0230 03/25/18 0600 03/25/18 0817 03/25/18 1121   BP: 104/57  123/58 125/68   BP Location: Left arm  Right arm Right arm   Pulse: 78  80 78   Resp: 18 18 18   Temp: 98 °F (36 7 °C)  97 8 °F (36 6 °C) 98 1 °F (36 7 °C)   TempSrc: Oral  Oral Oral   SpO2: 97%  98% 98%   Weight:  121 kg (265 lb 11 2 oz)     Height:           Physical Exam:  HEENT/NECK:  PERRLA  No jugular venous distention  Cardiac:Regular rate and rhythm  Pulmonary:  Breath sounds clear bilaterally  Abdomen:  Non-tender, Non-distended  Positive bowel sounds  Lower extremities: Extremities warm/dry  Radial/PT/DP pules 2+ bilaterally  2+ edema B/L  L foot bandages C/D/I  Neuro: Alert and oriented X 3  Sensation is grossly intact  No focal deficits  Skin: Warm/Dry, without rashes or lesions      Assessment:  Patient Active Problem List   Diagnosis    Anxiety and depression    Benign essential HTN    Diabetic autonomic neuropathy associated with type 2 diabetes mellitus ([facility] [address] )    Diabetic neuropathy, type II diabetes mellitus ([facility] [address] )    Hyperlipidemia    Vitamin D deficiency    PAD (peripheral artery disease) ([facility] [address] )    T wave inversion in EKG    Non-healing wound of left heel    Orthostatic hypotension    Diabetic gastroparesis (HCC)    Class 2 obesity due to excess calories with serious comorbidity and body mass index (BMI) of 35 0 to 35 9 in adult    Triple vessel coronary artery disease    Acute on chronic diastolic heart failure (HCC)    Non-ST elevation myocardial infarction (NSTEMI), type 2 (HCC)     Severe coronary artery disease;  Ongoing CABG workup    Plan:  Patient agreeable to proceed with surgery  Continue Mupirocin 2% nasal ointment q 12 hrs  Continue topical chlorhexidine bath and mouth rise  Carotid US pending   coronary artery bypass grafting scheduled for Wednesday with KEVIN Londono: Ching Rios PA-C  DATE: March 25, 2018  TIME: 2:43 PM

## 2018-03-25 NOTE — ASSESSMENT & PLAN NOTE
· With unclear systolic component, patient ejection fraction 45% on recent stress test  · The still positive balance after receiving 40 mg IV Lasix b i d   · Patient with increased lower extremity edema, trace edema at the sacral area, increase Lasix to 80 mg IV t i d  Today  · Strict I&O  · Daily weight  · Appreciated cardiology recommendation, no change in therapy today

## 2018-03-25 NOTE — PHYSICAL THERAPY NOTE
Physical Therapy Evaluation    Patient's Name: Shay Duron    Admitting Diagnosis  CAD (coronary artery disease) [I25 10]    Problem List  Patient Active Problem List   Diagnosis    Anxiety and depression    Benign essential HTN    Diabetic autonomic neuropathy associated with type 2 diabetes mellitus ([facility]    Diabetic neuropathy, type II diabetes mellitus ([facility] )    Hyperlipidemia    Vitamin D deficiency    PAD (peripheral artery disease) ([facility] )    T wave inversion in EKG    Non-healing wound of left heel    Orthostatic hypotension    Diabetic gastroparesis ([facility] )    Class 2 obesity due to excess calories with serious comorbidity and body mass index (BMI) of 35 0 to 35 9 in adult    Triple vessel coronary artery disease    Acute systolic heart failure ([place] [address] )       Past Medical History  Past Medical History:   Diagnosis Date    Anemia     Anxiety and depression     Autonomic neuropathy     Cataract     Diabetes mellitus ([facility] )     Diabetic autonomic neuropathy associated with type 2 diabetes mellitus ([facility] )     Last Assessed: 12/28/2017    Gastroparesis due to DM ([facility] )     History of DVT (deep vein thrombosis)     left LE    HTN (hypertension)     Hyperlipidemia     Non healing left heel wound     Obesity     Orthostatic hypotension        Past Surgical History  Past Surgical History:   Procedure Laterality Date    ROTATOR CUFF REPAIR Bilateral       03/25/18 9911   Note Type   Note type Eval only   Pain Assessment   Pain Assessment 0-10   Pain Score 2   Pain Type Chronic pain;Acute pain   Pain Location Chest;Leg   Patient's Stated Pain Goal No pain   Hospital Pain Intervention(s) Ambulation/increased activity   Response to Interventions unchanged   Home Living   Type of Home House   Additional Comments Resides w/ wife in 2 story home, multiple steps to living area  recently began using RW, does also have cane  Indep self care normally  Does not work, drive    Wife does   Prior Function Level of [place] Independent with ADLs and functional mobility   Falls in the last 6 months 0   Restrictions/Precautions   Weight Bearing Precautions Per Order Yes   LLE Weight Bearing Per Order NWB   Other Precautions WBS; Chair Alarm; Bed Alarm;Multiple lines;Telemetry;O2;Fall Risk;Pain   General   Family/Caregiver Present No   Cognition   Overall Cognitive Status WFL   Arousal/Participation Responsive   Attention Within functional limits   Orientation Level Oriented X4   Memory Unable to assess   Following Commands Follows all commands and directions without difficulty   RLE Assessment   RLE Assessment (strength 4/5)   LLE Assessment   LLE Assessment (strength 4-/5)   Light Touch   RLE Light Touch Impaired   RLE Light Touch Comments B LEs due to neuropathy   Transfers   Sit to Stand 4  Minimal assistance   Additional items Assist x 1   Stand to Sit 4  Minimal assistance   Additional items Assist x 1   Ambulation/Elevation   Gait pattern (slow, antalgic, fair maintaining of NWB)   Gait Assistance 4  Minimal assist   Additional items Assist x 1   Assistive Device Rolling walker   Distance 10'x1, c/o significant weakness and dizziness p same  BP 93/50, nurse aware   Balance   Static Sitting Normal   Dynamic Sitting Good   Static Standing Fair -   Dynamic Standing Poor +   Ambulatory Poor +   Endurance Deficit   Endurance Deficit Yes   Endurance Deficit Description fatigue, weakness, pain   Activity Tolerance   Activity Tolerance Patient limited by fatigue;Patient limited by pain;Treatment limited secondary to medical complications (Comment)   Nurse Made Aware yes   Assessment   Prognosis Fair   Problem List Decreased strength;Decreased endurance; Impaired balance;Decreased mobility;Pain;Obesity   Assessment Pt seen for high complexity physical therapy evaluation    Pt is a 60 y/o male w/ history/comorbidities of anxiety/depression, HTN, DM w/ neuropathy, HLD, PAD, L LE non healing foot wound, morbid obesity, admitted as a transfer from [facility]  Seen there with non healing L foot wound, as well as chest pain  Cath there showed 3 V CAD, and transferred for cardiac sx consult  Pt pending tentitive OR for CABG next week  Due to L LE wound, pt made NWB by podiatry  PT consulted pre-op at request of CM for assess mobility, d/c needs  Given need for OR for CABG, new WBing changes, fall risk, pain, orthostasis w/ mobility, note unstable clinical picture  Pt presents w/ decreased functional mob, standing baalnce, endurance, B LE strength, barriers at home  will benefit from skilled PT to correct for the above problems  Anticipate pt will need post op inpatient rehab, as he will continue to be NWB which will be made more difficult due to sternal precautions  Pt also has inaccessable home enviroment, and also will likely need OR for LE re-vascularization after recovering from CABG   Goals   Patient Goals none stated   STG Expiration Date 04/08/18   Short Term Goal #1 1-2 wks: bed mob and transfers w/ indep, standing balance to good w/ device, ambulate 25-50 ft w/ RW and S NWB on L, increase B LE strength by 1/2 -1 grade   Treatment Day 0   Plan   Treatment/Interventions Functional transfer training;LE strengthening/ROM; Therapeutic exercise; Endurance training;Patient/family training;Gait training;Bed mobility; Equipment eval/education   PT Frequency 5x/wk   Recommendation   Recommendation (will need rehab p OR)   Equipment Recommended Wheelchair;Walker   PT - OK to Discharge (to rehab once medically appropriate)   Modified [place] Scale   Modified [place] Scale 4   Barthel Index   Feeding 10   Bathing 0   Grooming Score 5   Dressing Score 5   Bladder Score 10   Bowels Score 10   Toilet Use Score 5   Transfers (Bed/Chair) Score 5   Mobility (Level Surface) Score 0   Stairs Score 0   Barthel Index Score 50       Tracie Coleman PT, DPT, CSRS

## 2018-03-25 NOTE — ASSESSMENT & PLAN NOTE
· A1c few days ago 9 4, diabetes uncontrolled with multiple complications, vascular and neurological  · Appreciated endocrinologist input, insulin adjustment have been noted  · Monitor closely

## 2018-03-25 NOTE — PROGRESS NOTES
Progress Note - Magui Hoang 61 y o  male MRN: [id number]    Unit/Bed#: [facility] 764-40 Encounter: [id number]      CC: diabetes f/u    Subjective:   Magui Hoang is a 61y o  year old male with type 2 diabetes  Feels well  No complaints  No hypoglycemia  Objective:     Vitals: Blood pressure 125/68, pulse 78, temperature 98 1 °F (36 7 °C), temperature source Oral, resp  rate 18, height 6' 1" (1 854 m), weight 121 kg (265 lb 11 2 oz), SpO2 98 %  ,Body mass index is 35 05 kg/m²  Intake/Output Summary (Last 24 hours) at 03/25/18 1306  Last data filed at 03/25/18 1304   Gross per 24 hour   Intake             1970 ml   Output             2150 ml   Net             -180 ml       Physical Exam:  General Appearance: awake, appears stated age and cooperative  Head: Normocephalic, without obvious abnormality, atraumatic  Extremities: moves all extremities  Skin: Skin color and temperature normal    Pulm: no labored breathing    Lab, Imaging and other studies: I have personally reviewed pertinent reports  POC Glucose   Date Value   03/25/2018 214 mg/dl (H)   03/25/2018 181 mg/dl (H)   03/25/2018 208 mg/dl (H)   03/24/2018 175 mg/dl (H)   03/24/2018 184 mg/dl (H)   03/24/2018 209 mg/dl (H)   03/24/2018 157 mg/dl (H)   03/23/2018 173 mg/dl (H)   03/22/2018 183 mg/dl (H)   03/22/2018 223 mg/dl (H)   01/11/2018 130   09/07/2016 84       Assessment:  diabetes with hyperglycemia    Plan:  -increase Lantus, to 65 units at bedtime  -increase Humalog to 24 units with meals  -continue sliding scale  -will follow-up      Portions of the record may have been created with voice recognition software

## 2018-03-25 NOTE — ASSESSMENT & PLAN NOTE
· Patient evaluated by Podiatry, patient evaluated by vascular surgery  · Status post failed arteriogram with attempted to revascularization on the left, status post debridement of the left heel by Podiatry  · Continue with Rocephin and Flagyl for a total of 7 days, day 3/7  · Previous images without osteomyelitis  · Local care as per Podiatry  · Vascular care as per PAD

## 2018-03-25 NOTE — ASSESSMENT & PLAN NOTE
· Patient evaluated by vascular surgery, unsuccessful angiographic left popliteal recanalization with IR on March 19  · Patient will be scheduled for femoropopliteal bypass when he recovered from CABG, possibly within a month from cardiac surgery  · Vascular surgery team has signed off

## 2018-03-25 NOTE — PROGRESS NOTES
Progress Note - Florian Gearing 1960, 61 y o  male MRN: [id number]    Unit/Bed#: [facility] 412-01 Encounter: [id number]    Primary Care Provider: Theresa Padilla DO   Date and time admitted to hospital: 3/23/2018  5:21 PM        Acute on chronic diastolic heart failure ([facility] 75 )   Assessment & Plan    · With unclear systolic component, patient ejection fraction 45% on recent stress test  · The still positive balance after receiving 40 mg IV Lasix b i d   · Patient with increased lower extremity edema, trace edema at the sacral area, increase Lasix to 80 mg IV t i d  Today  · Strict I&O  · Daily weight  · Appreciated cardiology recommendation, no change in therapy today        Class 2 obesity due to excess calories with serious comorbidity and body mass index (BMI) of 35 0 to 35 9 in adult   Assessment & Plan    · Discuss with primary care physician options for weight loss        Non-healing wound of left heel   Assessment & Plan    · Patient evaluated by Podiatry, patient evaluated by vascular surgery  · Status post failed arteriogram with attempted to revascularization on the left, status post debridement of the left heel by Podiatry  · Continue with Rocephin and Flagyl for a total of 7 days, day 3/7  · Previous images without osteomyelitis  · Local care as per Podiatry  · Vascular care as per PAD        PAD (peripheral artery disease) ([place] [address] )   Assessment & Plan    · Patient evaluated by vascular surgery, unsuccessful angiographic left popliteal recanalization with IR on March 19  · Patient will be scheduled for femoropopliteal bypass when he recovered from CABG, possibly within a month from cardiac surgery  · Vascular surgery team has signed off        Diabetic autonomic neuropathy associated with type 2 diabetes mellitus ([facility] )   Assessment & Plan    · A1c few days ago 9 4, diabetes uncontrolled with multiple complications, vascular and neurological  · Appreciated endocrinologist input, insulin adjustment have been noted  · Monitor closely        Benign essential HTN   Assessment & Plan    · Continue with metoprolol IV p r n , so far patient has not required any inpatient agent on a scheduled fashion  · Monitor         Anxiety and depression   Assessment & Plan    · Continue with p r n  Ativan  · mood appears stable        * Triple vessel coronary artery disease   Assessment & Plan    · Patient was admitted in [facility] for chest pain and nonhealing wound of the left lower extremity  · Underwent cardiac catheterization, found to have triple-vessel disease  · Continue with current medical management including aspirin, statin  · For CABG on Wednesday with CT surgery team          VTE Pharmacologic Prophylaxis: yes  Pharmacologic: Enoxaparin (Lovenox)  Mechanical VTE Prophylaxis in Place: Yes     Patient Centered Rounds: I have performed bedside rounds with nursing staff today      Discussions with Specialists or Other Care Team Provider:  Cardiothoracic surgery     Education and Discussions with Family / Patient:  Patient     Time Spent for Care: 35 minutes  More than 50% of total time spent on counseling and coordination of care as described above      Current Length of Stay: 2 day(s)     Current Patient Status: Inpatient   Certification Statement: The patient will continue to require additional inpatient hospital stay due to Please refer to above     Discharge Plan: To be determined, likely rehabilitation     Code Status: Level 1 - Full Code    Subjective:   Patient is feeling well today  He is anxious about surgery  No concerns or complaints at this time  Objective:     Vitals:   Temp (24hrs), Av 9 °F (36 6 °C), Min:97 5 °F (36 4 °C), Max:98 4 °F (36 9 °C)    HR:  [71-80] 78  Resp:  [18] 18  BP: ()/(53-75) 125/68  SpO2:  [94 %-98 %] 98 %  Body mass index is 35 05 kg/m²  Input and Output Summary (last 24 hours):        Intake/Output Summary (Last 24 hours) at 18 1550  Last data filed at 18 1448   Gross per 24 hour   Intake             1420 ml   Output             2650 ml   Net            -1230 ml       Physical Exam:     Physical Exam   Constitutional: He is oriented to person, place, and time  He appears well-developed  Cardiovascular: Normal rate, regular rhythm and normal heart sounds  Exam reveals no friction rub  No murmur heard  Pulmonary/Chest: Effort normal  No respiratory distress  He has no wheezes  He has no rales  Abdominal: Soft  He exhibits no distension  There is no tenderness  There is no rebound  Musculoskeletal: He exhibits edema (Plus two edema bilateral lower extremity up to the groin, some abdominal wall edema, trace edema at the sacral area)  Neurological: He is alert and oriented to person, place, and time  He exhibits normal muscle tone  Skin: Skin is warm  Psychiatric: He has a normal mood and affect  Additional Data:     Labs:      Results from last 7 days  Lab Units 03/25/18  0544   WBC Thousand/uL 8 71   HEMOGLOBIN g/dL 11 9*   HEMATOCRIT % 36 4*   PLATELETS Thousands/uL 291   NEUTROS PCT % 67   LYMPHS PCT % 22   MONOS PCT % 7   EOS PCT % 3       Results from last 7 days  Lab Units 03/25/18  0544   SODIUM mmol/L 133*   POTASSIUM mmol/L 4 3   CHLORIDE mmol/L 100   CO2 mmol/L 29   BUN mg/dL 12   CREATININE mg/dL 0 89   CALCIUM mg/dL 8 9   GLUCOSE RANDOM mg/dL 178*       Results from last 7 days  Lab Units 03/25/18  0544   INR  1 06       * I Have Reviewed All Lab Data Listed Above  * Additional Pertinent Lab Tests Reviewed:  All Labs Within Last 24 Hours Reviewed    Imaging:    Imaging Reports Reviewed Today Include:  None  Imaging Personally Reviewed by Myself Includes:  None    Recent Cultures (last 7 days):           Last 24 Hours Medication List:     Current Facility-Administered Medications:  acetaminophen 650 mg Oral Q6H PRN Jamila Solomon MD    aspirin 325 mg Oral Daily Jamila Solomon MD    atorvastatin 40 mg Oral Daily Jamila Solomon MD bisacodyl 10 mg Rectal Daily PRN Viktoria Navas MD    calcium carbonate 500 mg Oral Daily PRN Reg Rodney MD    cefTRIAXone 1,000 mg Intravenous Q24H Reg Rodney MD Last Rate: 1,000 mg (03/24/18 1732)   collagenase  Topical Daily Reg Rodney MD    docusate sodium 100 mg Oral BID Reg Rodney MD    enoxaparin 40 mg Subcutaneous Daily Reg Rodney MD    furosemide 80 mg Intravenous TID (diuretic) Viktoria Navas MD    insulin glargine 65 Units Subcutaneous HS Antonieta Knight MD    insulin lispro 1-6 Units Subcutaneous HS Antonieta Knight MD    insulin lispro 1-6 Units Subcutaneous 0200 Antonieta Knight MD    insulin lispro 2-12 Units Subcutaneous TID AC Antonieta Knight MD    insulin lispro 24 Units Subcutaneous TID With Meals Antonieta Knight MD    lactulose 20 g Oral 4x Daily Viktoria Navas MD    lactulose 200 g Rectal BID PRN Reg Rodney MD    LORazepam 0 5 mg Oral BID PRN Reg Rodney MD    metoclopramide 5 mg Oral Daily Reg Rodney MD    metoprolol 2 5 mg Intravenous Q6H PRN Balta John, PA-C    metroNIDAZOLE 500 mg Intravenous Lai Colindres MD Last Rate: Stopped (03/25/18 1117)   morphine injection 2 mg Intravenous Q4H PRN Reg Rodney MD    mupirocin 1 application Nasal H91C [address], PA-C    ondansetron 4 mg Intravenous Q6H PRN Reg Rodney MD         Today, Patient Was Seen By: Viktoria Navas MD    ** Please Note: Dragon 360 Dictation voice to text software may have been used in the creation of this document   **

## 2018-03-25 NOTE — PROGRESS NOTES
Progress Note - Cardiology   Jada Wagoner 61 y o  male MRN: [id number]  Encounter: [id number]  03/25/18  1:42 PM        Assessment/Plan:    1  Type II NSTEMI with underlying multivessel CAD  Will need eventual CABG  CT surgery evaluation in progress  On aspirin, statin, no beta blocker due to history of orthostatic hypotension requiring Midodrine 5 tid as outpt  2  Acute/chronic diastolic HF  Was on Torsemide 10 as outpt  Here on Lasix 80 IV tid  Creatinine stable, monitor with diuresis  3  PAD  Revascularization deferred until after CABG  On Ceftriaxone/Flagyl for foot wound  On aspirin, statin  4  DM  On Lantus/humalog  Subjective/Objective   Chief Complaint: No chief complaint on file  Subjective: 61year old man with PAD, DM, HTN, HL, admitted to [address] [address] with nonhealing left foot wound, found to have elevated troponins  He had complained of some chest discomfort  Echo showed preserved LV function with hypokinesis of inferior wall, but given abnormal troponins he had nuclear stress test which showed a partially reversible large, moderate intensity defect of inferolateral wall  He underwent cardiac catheterization the following day, which showed multivessel disease  He was transferred to [facility] for CT surgery evaluation for CABG  He denies any current CP  Has some orthopnea which is chronic, believes it might be related to anxiety  Has LE edema  No fevers         Patient Active Problem List   Diagnosis    Anxiety and depression    Benign essential HTN    Diabetic autonomic neuropathy associated with type 2 diabetes mellitus ([address] )    Diabetic neuropathy, type II diabetes mellitus ([address] )    Hyperlipidemia    Vitamin D deficiency    PAD (peripheral artery disease) ([address] )    T wave inversion in EKG    Non-healing wound of left heel    Orthostatic hypotension    Diabetic gastroparesis (HCC)    Class 2 obesity due to excess calories with serious comorbidity and body mass index (BMI) of 35 0 to 35 9 in adult    Triple vessel coronary artery disease    Acute systolic heart failure (HCC)     Past Medical History:   Diagnosis Date    Anemia     Anxiety and depression     Autonomic neuropathy     Cataract     Diabetes mellitus ([place] [address] )     Diabetic autonomic neuropathy associated with type 2 diabetes mellitus ([place] [address] )     Last Assessed: 12/28/2017    Gastroparesis due to DM ([place] [address] )     History of DVT (deep vein thrombosis)     left LE    HTN (hypertension)     Hyperlipidemia     Non healing left heel wound     Obesity     Orthostatic hypotension        Allergies   Allergen Reactions    Metformin        Current Facility-Administered Medications   Medication Dose Route Frequency Provider Last Rate Last Dose    acetaminophen (TYLENOL) tablet 650 mg  650 mg Oral Q6H PRN Kerwin Ng MD        aspirin tablet 325 mg  325 mg Oral Daily Kerwin Ng MD   325 mg at 03/25/18 0820    atorvastatin (LIPITOR) tablet 40 mg  40 mg Oral Daily Kerwin Ng MD   40 mg at 03/24/18 1732    bisacodyl (DULCOLAX) rectal suppository 10 mg  10 mg Rectal Daily PRN Bayron Harris MD        calcium carbonate (TUMS) chewable tablet 500 mg  500 mg Oral Daily PRN Kerwin Ng MD        cefTRIAXone (ROCEPHIN) IVPB (premix) 1,000 mg  1,000 mg Intravenous Q24H Kerwin Ng  mL/hr at 03/24/18 1732 1,000 mg at 03/24/18 1732    collagenase (SANTYL) ointment   Topical Daily Kerwin Ng MD        docusate sodium (COLACE) capsule 100 mg  100 mg Oral BID Kerwin Ng MD   100 mg at 03/25/18 0820    enoxaparin (LOVENOX) subcutaneous injection 40 mg  40 mg Subcutaneous Daily Kerwin Ng MD   40 mg at 03/25/18 0819    furosemide (LASIX) injection 80 mg  80 mg Intravenous TID (diuretic) Bayron Harris MD   80 mg at 03/25/18 1156    insulin glargine (LANTUS) subcutaneous injection 65 Units  65 Units Subcutaneous HS Evelyn Tay MD        insulin lispro (HumaLOG) 100 units/mL subcutaneous injection 1-6 Units  1-6 Units Subcutaneous HS Shaina Cedeño MD   1 Units at 03/24/18 2208    insulin lispro (HumaLOG) 100 units/mL subcutaneous injection 1-6 Units  1-6 Units Subcutaneous 0200 Shaina Cedeño MD   2 Units at 03/25/18 0230    insulin lispro (HumaLOG) 100 units/mL subcutaneous injection 2-12 Units  2-12 Units Subcutaneous TID [facility] Shaina Cedeño MD   4 Units at 03/25/18 1157    insulin lispro (HumaLOG) 100 units/mL subcutaneous injection 24 Units  24 Units Subcutaneous TID With Meals Shaina Cedeño MD        lactulose 20 g/30 mL oral solution 20 g  20 g Oral 4x Daily Saira Alcantar MD   20 g at 03/25/18 1156    lactulose retention enema 200 g  200 g Rectal BID PRN Kiran Cevallos MD        LORazepam (ATIVAN) tablet 0 5 mg  0 5 mg Oral BID PRN Kiran Cevallos MD        metoclopramide (REGLAN) tablet 5 mg  5 mg Oral Daily Kiran Cevallos MD   5 mg at 03/25/18 0820    metoprolol (LOPRESSOR) injection 2 5 mg  2 5 mg Intravenous Q6H PRN Marsha Degroot PA-C        metroNIDAZOLE (FLAGYL) IVPB (premix) 500 mg  500 mg Intravenous Clarita Nageotte, MD   Stopped at 03/25/18 1117    morphine injection 2 mg  2 mg Intravenous Q4H PRN Kiran Cevallos MD        mupirocin (BACTROBAN) 2 % nasal ointment 1 application  1 application Nasal H75C [address], PA-C   1 application at 67/50/45 9776    ondansetron (ZOFRAN) injection 4 mg  4 mg Intravenous Q6H PRN Kiran Cevallos MD           Vitals: /68 (BP Location: Right arm) Comment: Map 92  Pulse 78   Temp 98 1 °F (36 7 °C) (Oral)   Resp 18   Ht 6' 1" (1 854 m)   Wt 121 kg (265 lb 11 2 oz)   SpO2 98%   BMI 35 05 kg/m²     Intake/Output Summary (Last 24 hours) at 03/25/18 1342  Last data filed at 03/25/18 1304   Gross per 24 hour   Intake             1970 ml   Output             2150 ml   Net             -180 ml     Wt Readings from Last 3 Encounters:   03/25/18 121 kg (265 lb 11 2 oz) 03/15/18 117 kg (258 lb 9 6 oz)   03/14/18 119 kg (263 lb 3 2 oz)       Body mass index is 35 05 kg/m²  ,     Vitals:    03/24/18 2231 03/25/18 0230 03/25/18 0817 03/25/18 1121   BP: 161/75 104/57 123/58 125/68   Pulse: 76 78 80 78   Patient Position - Orthostatic VS: Lying Sitting Sitting Sitting       Physical Exam:     GEN: Alert and oriented x 3, in no acute distress  HEENT: Sclera anicteric, conjunctivae pink, mucous membranes moist   NECK: Supple, no carotid bruits, no significant JVD  HEART: Regular rhythm, normal S1 and S2, no murmurs, clicks, gallops or rubs  LUNGS: Clear to auscultation bilaterally; no wheezes, rales, or rhonchi   ABDOMEN: Obese, soft, nontender, nondistended, normoactive bowel sounds  EXTREMITIES: Skin warm and well perfused, no clubbing, cyanosis, left greater than right 2+ edema  Left foot in boot  NEURO: No focal findings  SKIN: Normal without suspicious lesions on exposed skin        Lab Results:     BMP:  Results from last 7 days  Lab Units 03/25/18  0544 03/23/18  0549 03/22/18  0514 03/20/18  0455 03/19/18  0556   SODIUM mmol/L 133* 135* 135* 135* 134*   POTASSIUM mmol/L 4 3 4 1 4 1 4 2 4 5   CHLORIDE mmol/L 100 101 100 99* 100   CO2 mmol/L 29 26 26 25 24   BUN mg/dL 12 9 11 12 13   CREATININE mg/dL 0 89 0 88 0 90 1 00 0 95   GLUCOSE RANDOM mg/dL 178* 172* 232* 221* 189*   CALCIUM mg/dL 8 9 8 3 8 4 8 8 9 1       CBC:   Results from last 7 days  Lab Units 03/25/18  0544 03/22/18  0514 03/20/18  0455 03/19/18  0556   WBC Thousand/uL 8 71 7 31 7 98 8 27   HEMOGLOBIN g/dL 11 9* 11 4* 10 8* 11 7*   HEMATOCRIT % 36 4* 35 0* 33 9* 35 4*   MCV fL 85 86 86 85   PLATELETS Thousands/uL 291 271 270 271   MCH pg 27 7 27 9 27 3 28 0   MCHC g/dL 32 7 32 6 31 9 33 1   RDW % 13 8 13 3 13 1 13 0   MPV fL 9 7 9 8 10 2 9 8   NRBC AUTO /100 WBCs 0  --   --  0        Results from last 7 days  Lab Units 03/25/18  0544   MAGNESIUM mg/dL 2 1       INR:     Results from last 7 days  Lab Units 18  0544   INR  1 06       Lipid Profile:   Lab Results   Component Value Date    CHOL 85 2018    CHOL 169 2018    CHOL 214 (H) 2016     Lab Results   Component Value Date    HDL 33 (L) 2018    HDL 38 (L) 2018    HDL 43 2016     Lab Results   Component Value Date    LDLCALC 39 2018    LDLCALC 96 2018    LDLCALC 129 (H) 2016     Lab Results   Component Value Date    TRIG 64 2018    TRIG 176 (H) 2018    TRIG 212 (H) 2016         Hgb A1c:         Telemetry: personally reviewed, SR  Cardiac testing:   Results for orders placed during the hospital encounter of 18   Echo complete with contrast if indicated    Narrative [address]  [address]  [place], [address]  [phone]    Transthoracic Echocardiogram  2D, M-mode, Doppler, and Color Doppler    Study date:  15-Mar-2018    Patient: John Ochoa  MR number: [id number]  Account number: [de-identified]  : 1960  Age: 61 years  Gender: Male  Status: Inpatient  Location: Bedside  Height: 73 in  Weight: 258 lb  BP: 160/ 85 mmHg    Indications: Acute myocardial infarction  Diagnoses: I21 4 - Non-ST elevation (NSTEMI) myocardial infarction    Sonographer:  Bertha Schmid RDCS  Primary Physician:  Felipa Cummings DO  Referring Physician:  Leta Muse PA-C  Group:  [facility]  Interpreting Physician:  Mahin Simmons MD    SUMMARY    LEFT VENTRICLE:  Size was at the upper limits of normal   Systolic function was normal  Ejection fraction was estimated in the range of 55 % to 60 %  There was moderate hypokinesis of the basal inferior wall(s)  Wall thickness was at the upper limits of normal     MITRAL VALVE:  There was trace regurgitation  TRICUSPID VALVE:  There was trace regurgitation  PULMONIC VALVE:  There was trace regurgitation      HISTORY: PRIOR HISTORY: Hypertension, Diabetes, Peripheral vascular disease, Elevated troponin  PROCEDURE: The procedure was performed at the bedside  This was a routine study  The transthoracic approach was used  The study included complete 2D imaging, M-mode, complete spectral Doppler, and color Doppler  The heart rate was 73 bpm,  at the start of the study  Images were obtained from the parasternal, apical, subcostal, and suprasternal notch acoustic windows  Echocardiographic views were limited due to decreased penetration and lung interference  This was a  technically difficult study  LEFT VENTRICLE: Size was at the upper limits of normal  Systolic function was normal  Ejection fraction was estimated in the range of 55 % to 60 %  There was moderate hypokinesis of the basal inferior wall(s)  Wall thickness was at the  upper limits of normal  DOPPLER: There was an increased relative contribution of atrial contraction to ventricular filling  The deceleration time of the early transmitral flow velocity was normal     RIGHT VENTRICLE: The size was normal  Systolic function was normal  Wall thickness was normal     LEFT ATRIUM: Size was at the upper limits of normal     RIGHT ATRIUM: Size was normal     MITRAL VALVE: Valve structure was normal  There was normal leaflet separation  DOPPLER: The transmitral velocity was within the normal range  There was no evidence for stenosis  There was trace regurgitation  AORTIC VALVE: The valve was trileaflet  Leaflets exhibited normal thickness and normal cuspal separation  DOPPLER: Transaortic velocity was within the normal range  There was no evidence for stenosis  There was no regurgitation  TRICUSPID VALVE: The valve structure was normal  There was normal leaflet separation  DOPPLER: The transtricuspid velocity was within the normal range  There was no evidence for stenosis  There was trace regurgitation  Pulmonary artery  systolic pressure was within the normal range  Estimated peak PA pressure was 20 mmHg      PULMONIC VALVE: Leaflets exhibited normal thickness, no calcification, and normal cuspal separation  DOPPLER: The transpulmonic velocity was within the normal range  There was trace regurgitation  PERICARDIUM: The amount of pericardial fluid appeared to be at the upper limits of normal     AORTA: The root exhibited normal size  SYSTEMIC VEINS: IVC: The inferior vena cava was not well visualized  SYSTEM MEASUREMENT TABLES    2D  Ao Diam: 3 6 cm  IVSd: 1 cm  LA Diam: 4 cm  LVIDd: 5 3 cm  LVIDs: 3 9 cm  LVPWd: 0 9 cm    CW  TR Vmax: 1 9 m/s  TR maxP 7 mmHg    PW  E': 0 m/s  E/E': 15 2  MV A Rocky: 0 7 m/s  MV Dec Karnes: 5 4 m/s2  MV DecT: 127 7 ms  MV E Rocky: 0 7 m/s  MV E/A Ratio: 1    [employer] Accredited Echocardiography Laboratory    Prepared and electronically signed by    Chadwick Martinez MD  Signed 15-Mar-2018 13:11:20       Results for orders placed during the hospital encounter of 18   NM myocardial perfusion spect (stress and/or rest)    [address], [address]  [phone]    Rest/Stress Gated SPECT Myocardial Perfusion Imaging After Regadenoson    Patient: Bowen Borrego  MR number: [id number]  Account number: [de-identified]  : 1960  Age: 61 years  Gender: Male  Status: Inpatient  Location: Stress lab  Height: 73 in  Weight: 258 lb  BP: 153/ 76 mmHg    Allergies: METFORMIN    Diagnosis: R94 39 - Abnormal result of other cardiovascular function study, Z01 810 - Encounter for preprocedural cardiovascular examination    Primary Physician:  Noah Wilson DO  Technician:  Eulas Mcardle  RN:  Aliya Haq RN BSN  Referring Physician:  Lucila Dasilva MD  Group:  [facility]  Report Prepared By[de-identified]  Aliya Haq RN BSN  Interpreting Physician:  Lucila Dasilva MD    INDICATIONS: Detection of Coronary artery disease  Pre-operative risk assessment  HISTORY: The patient is a 60year old  male   Chest pain status: no chest pain  Other symptoms: decreased effort tolerance  Coronary artery disease risk factors: dyslipidemia and diabetes mellitus  Cardiovascular history:  peripheral vascular occlusive disease(PAD)  Co-morbidity: obesity  Medications: a lipid lowering agent  Previous test results: abnormal ECG and abnormal resting echocardiogram     PHYSICAL EXAM: Baseline physical exam screening: normal lung exam     REST ECG: Normal sinus rhythm  There was 1-2 mm ST depression in leads II, III and aVF  PROCEDURE: The study was performed in the stress lab  A regadenoson infusion pharmacologic stress test was performed  Gated SPECT myocardial perfusion imaging was performed after stress and at rest  Systolic blood pressure was 153 mmHg, at  the start of the study  Diastolic blood pressure was 76 mmHg, at the start of the study  The heart rate was 78 bpm, at the start of the study  IV double checked  Regadenoson protocol:  HR bpm SBP mmHg DBP mmHg Symptoms  Baseline 78 153 76 none  Immediate 86 105 64 moderate dyspnea  3 min 81 -- -- subsiding  5 min 82 115 64 none  No medications or fluids given  STRESS SUMMARY: Duration of pharmacologic stress was 3 min  Maximal heart rate during stress was 86 bpm  The rate-pressure product for the peak heart rate and blood pressure was 87516  There was no chest pain during stress  The stress test  was terminated due to protocol completion  Pre oxygen saturation: 97 %  Peak oxygen saturation: 98 %  Arrhythmia during stress: isolated atrial premature beats  The stress ECG was non-diagnostic due to resting ST/T wave abnormality      ISOTOPE ADMINISTRATION:  Resting isotope administration Stress isotope administration  Agent Tetrofosmin Tetrofosmin  Dose 15 2 mCi 48 8 mCi  Date 03/22/2018 03/22/2018  Injection time 10:34 11:40  Imaging time 11:08 12:26  Injection-image interval 34 min 46 min    The radiopharmaceutical was injected at the peak effect of pharmacologic stress  MYOCARDIAL PERFUSION IMAGING:  The image quality was good  The left ventricle was mildly dilated  The TID ratio was 1 28  PERFUSION DEFECTS:  -  There was a large, moderately severe, partially reversible myocardial perfusion defect of the entire inferolateral wall  GATED SPECT:  The calculated left ventricular ejection fraction was 45 %  Left ventricular ejection fraction was mildly decreased by visual estimate  There was moderately reduced myocardial thickening and motion of the inferior wall and lateral wall of  the left ventricle  SUMMARY:  -  Stress results: There was no chest pain during stress  -  ECG conclusions: The stress ECG was non-diagnostic due to resting ST/T wave abnormality   -  Perfusion imaging: There was a large, moderately severe, partially reversible myocardial perfusion defect of the entire inferolateral wall  -  Gated SPECT: The calculated left ventricular ejection fraction was 45 %  Left ventricular ejection fraction was mildly decreased by visual estimate  There was moderately reduced myocardial thickening and motion of the inferior wall and  lateral wall of the left ventricle  IMPRESSIONS: Abnormal study after pharmacologic stress      Prepared and signed by    Sarath Odonnell MD  Signed 03/22/2018 74:97:54

## 2018-03-25 NOTE — PLAN OF CARE
Problem: PHYSICAL THERAPY ADULT  Goal: Performs mobility at highest level of function for planned discharge setting  See evaluation for individualized goals  Treatment/Interventions: Functional transfer training, LE strengthening/ROM, Therapeutic exercise, Endurance training, Patient/family training, Gait training, Bed mobility, Equipment eval/education  Equipment Recommended: Wheelchair, [place]       See flowsheet documentation for full assessment, interventions and recommendations  Prognosis: Fair  Problem List: Decreased strength, Decreased endurance, Impaired balance, Decreased mobility, Pain, Obesity  Assessment: Pt seen for high complexity physical therapy evaluation  Pt is a 62 y/o male w/ history/comorbidities of anxiety/depression, HTN, DM w/ neuropathy, HLD, PAD, L LE non healing foot wound, morbid obesity, admitted as a transfer from [facility]  Seen there with non healing L foot wound, as well as chest pain  Cath there showed 3 V CAD, and transferred for cardiac sx consult  Pt pending tentitive OR for CABG next week  Due to L LE wound, pt made NWB by podiatry  PT consulted pre-op at request of CM for assess mobility, d/c needs  Given need for OR for CABG, new WBing changes, fall risk, pain, orthostasis w/ mobility, note unstable clinical picture  Pt presents w/ decreased functional mob, standing baalnce, endurance, B LE strength, barriers at home  will benefit from skilled PT to correct for the above problems  Anticipate pt will need post op inpatient rehab, as he will continue to be NWB which will be made more difficult due to sternal precautions  Pt also has inaccessable home enviroment, and also will likely need OR for LE re-vascularization after recovering from CABG        Recommendation:  (will need rehab p OR)     PT - OK to Discharge:  (to rehab once medically appropriate)    See flowsheet documentation for full assessment

## 2018-03-26 ENCOUNTER — APPOINTMENT (INPATIENT)
Dept: NON INVASIVE DIAGNOSTICS | Facility: HOSPITAL | Age: 58
DRG: 235 | End: 2018-03-26
Payer: COMMERCIAL

## 2018-03-26 PROBLEM — K59.09 OTHER CONSTIPATION: Status: ACTIVE | Noted: 2018-03-26

## 2018-03-26 PROBLEM — I25.10 DISEASE OF CARDIOVASCULAR SYSTEM: Status: ACTIVE | Noted: 2018-03-23

## 2018-03-26 LAB
ANION GAP SERPL CALCULATED.3IONS-SCNC: 9 MMOL/L (ref 4–13)
BASOPHILS # BLD AUTO: 0.04 THOUSANDS/ΜL (ref 0–0.1)
BASOPHILS NFR BLD AUTO: 0 % (ref 0–1)
BUN SERPL-MCNC: 17 MG/DL (ref 5–25)
CALCIUM SERPL-MCNC: 8.8 MG/DL (ref 8.3–10.1)
CHLORIDE SERPL-SCNC: 99 MMOL/L (ref 100–108)
CO2 SERPL-SCNC: 26 MMOL/L (ref 21–32)
CREAT SERPL-MCNC: 0.99 MG/DL (ref 0.6–1.3)
EOSINOPHIL # BLD AUTO: 0.28 THOUSAND/ΜL (ref 0–0.61)
EOSINOPHIL NFR BLD AUTO: 3 % (ref 0–6)
ERYTHROCYTE [DISTWIDTH] IN BLOOD BY AUTOMATED COUNT: 14.1 % (ref 11.6–15.1)
GFR SERPL CREATININE-BSD FRML MDRD: 84 ML/MIN/1.73SQ M
GLUCOSE SERPL-MCNC: 108 MG/DL (ref 65–140)
GLUCOSE SERPL-MCNC: 114 MG/DL (ref 65–140)
GLUCOSE SERPL-MCNC: 120 MG/DL (ref 65–140)
GLUCOSE SERPL-MCNC: 122 MG/DL (ref 65–140)
GLUCOSE SERPL-MCNC: 126 MG/DL (ref 65–140)
GLUCOSE SERPL-MCNC: 142 MG/DL (ref 65–140)
GLUCOSE SERPL-MCNC: 96 MG/DL (ref 65–140)
HCT VFR BLD AUTO: 36 % (ref 36.5–49.3)
HGB BLD-MCNC: 11.8 G/DL (ref 12–17)
LYMPHOCYTES # BLD AUTO: 2.04 THOUSANDS/ΜL (ref 0.6–4.47)
LYMPHOCYTES NFR BLD AUTO: 21 % (ref 14–44)
MAGNESIUM SERPL-MCNC: 2 MG/DL (ref 1.6–2.6)
MCH RBC QN AUTO: 27.8 PG (ref 26.8–34.3)
MCHC RBC AUTO-ENTMCNC: 32.8 G/DL (ref 31.4–37.4)
MCV RBC AUTO: 85 FL (ref 82–98)
MONOCYTES # BLD AUTO: 0.71 THOUSAND/ΜL (ref 0.17–1.22)
MONOCYTES NFR BLD AUTO: 7 % (ref 4–12)
MRSA NOSE QL CULT: NORMAL
NEUTROPHILS # BLD AUTO: 6.76 THOUSANDS/ΜL (ref 1.85–7.62)
NEUTS SEG NFR BLD AUTO: 69 % (ref 43–75)
NRBC BLD AUTO-RTO: 0 /100 WBCS
PHOSPHATE SERPL-MCNC: 4.8 MG/DL (ref 2.7–4.5)
PLATELET # BLD AUTO: 286 THOUSANDS/UL (ref 149–390)
PMV BLD AUTO: 10 FL (ref 8.9–12.7)
POTASSIUM SERPL-SCNC: 3.9 MMOL/L (ref 3.5–5.3)
RBC # BLD AUTO: 4.25 MILLION/UL (ref 3.88–5.62)
SODIUM SERPL-SCNC: 134 MMOL/L (ref 136–145)
WBC # BLD AUTO: 9.87 THOUSAND/UL (ref 4.31–10.16)

## 2018-03-26 PROCEDURE — 93880 EXTRACRANIAL BILAT STUDY: CPT | Performed by: SURGERY

## 2018-03-26 PROCEDURE — 99232 SBSQ HOSP IP/OBS MODERATE 35: CPT | Performed by: INTERNAL MEDICINE

## 2018-03-26 PROCEDURE — 82948 REAGENT STRIP/BLOOD GLUCOSE: CPT

## 2018-03-26 PROCEDURE — 97530 THERAPEUTIC ACTIVITIES: CPT

## 2018-03-26 PROCEDURE — 80048 BASIC METABOLIC PNL TOTAL CA: CPT | Performed by: INTERNAL MEDICINE

## 2018-03-26 PROCEDURE — 97110 THERAPEUTIC EXERCISES: CPT

## 2018-03-26 PROCEDURE — 93880 EXTRACRANIAL BILAT STUDY: CPT

## 2018-03-26 PROCEDURE — 85025 COMPLETE CBC W/AUTO DIFF WBC: CPT | Performed by: INTERNAL MEDICINE

## 2018-03-26 PROCEDURE — 84100 ASSAY OF PHOSPHORUS: CPT | Performed by: INTERNAL MEDICINE

## 2018-03-26 PROCEDURE — 83735 ASSAY OF MAGNESIUM: CPT | Performed by: INTERNAL MEDICINE

## 2018-03-26 RX ORDER — BUMETANIDE 0.25 MG/ML
2 INJECTION, SOLUTION INTRAMUSCULAR; INTRAVENOUS
Status: COMPLETED | OUTPATIENT
Start: 2018-03-26 | End: 2018-03-27

## 2018-03-26 RX ORDER — BUMETANIDE 0.25 MG/ML
2 INJECTION, SOLUTION INTRAMUSCULAR; INTRAVENOUS
Status: DISCONTINUED | OUTPATIENT
Start: 2018-03-26 | End: 2018-03-26

## 2018-03-26 RX ORDER — CHLORHEXIDINE GLUCONATE 0.12 MG/ML
15 RINSE ORAL EVERY 12 HOURS SCHEDULED
Status: DISCONTINUED | OUTPATIENT
Start: 2018-03-26 | End: 2018-03-28 | Stop reason: HOSPADM

## 2018-03-26 RX ADMIN — LACTULOSE 20 G: 20 SOLUTION ORAL at 08:22

## 2018-03-26 RX ADMIN — BUMETANIDE 2 MG: 0.25 INJECTION INTRAMUSCULAR; INTRAVENOUS at 17:05

## 2018-03-26 RX ADMIN — ENOXAPARIN SODIUM 40 MG: 40 INJECTION SUBCUTANEOUS at 08:22

## 2018-03-26 RX ADMIN — FUROSEMIDE 80 MG: 10 INJECTION, SOLUTION INTRAMUSCULAR; INTRAVENOUS at 05:39

## 2018-03-26 RX ADMIN — CEFTRIAXONE 1000 MG: 1 INJECTION, SOLUTION INTRAVENOUS at 17:49

## 2018-03-26 RX ADMIN — INSULIN GLARGINE 65 UNITS: 100 INJECTION, SOLUTION SUBCUTANEOUS at 21:54

## 2018-03-26 RX ADMIN — LACTULOSE 20 G: 20 SOLUTION ORAL at 21:17

## 2018-03-26 RX ADMIN — DOCUSATE SODIUM 100 MG: 100 CAPSULE, LIQUID FILLED ORAL at 08:22

## 2018-03-26 RX ADMIN — MUPIROCIN 1 APPLICATION: 20 OINTMENT TOPICAL at 20:57

## 2018-03-26 RX ADMIN — DOCUSATE SODIUM 100 MG: 100 CAPSULE, LIQUID FILLED ORAL at 17:05

## 2018-03-26 RX ADMIN — METOCLOPRAMIDE HYDROCHLORIDE 5 MG: 10 TABLET ORAL at 08:22

## 2018-03-26 RX ADMIN — METRONIDAZOLE 500 MG: 500 INJECTION, SOLUTION INTRAVENOUS at 11:30

## 2018-03-26 RX ADMIN — ASPIRIN 325 MG: 325 TABLET ORAL at 08:22

## 2018-03-26 RX ADMIN — LACTULOSE 20 G: 20 SOLUTION ORAL at 11:48

## 2018-03-26 RX ADMIN — BISACODYL 10 MG: 10 SUPPOSITORY RECTAL at 21:17

## 2018-03-26 RX ADMIN — MUPIROCIN 1 APPLICATION: 20 OINTMENT TOPICAL at 08:22

## 2018-03-26 RX ADMIN — ATORVASTATIN CALCIUM 40 MG: 40 TABLET, FILM COATED ORAL at 17:12

## 2018-03-26 RX ADMIN — CHLORHEXIDINE GLUCONATE 15 ML: 1.2 RINSE ORAL at 12:30

## 2018-03-26 RX ADMIN — BUMETANIDE 2 MG: 0.25 INJECTION INTRAMUSCULAR; INTRAVENOUS at 11:35

## 2018-03-26 RX ADMIN — LACTULOSE 20 G: 20 SOLUTION ORAL at 17:05

## 2018-03-26 RX ADMIN — METRONIDAZOLE 500 MG: 500 INJECTION, SOLUTION INTRAVENOUS at 02:54

## 2018-03-26 RX ADMIN — CHLORHEXIDINE GLUCONATE 15 ML: 1.2 RINSE ORAL at 20:57

## 2018-03-26 RX ADMIN — BISACODYL 5 MG: 5 TABLET, COATED ORAL at 17:05

## 2018-03-26 RX ADMIN — METRONIDAZOLE 500 MG: 500 INJECTION, SOLUTION INTRAVENOUS at 17:58

## 2018-03-26 NOTE — PHYSICAL THERAPY NOTE
Physical Therapy Progress Note     03/26/18 1235   Pain Assessment   Pain Assessment No/denies pain   Pain Score No Pain   Restrictions/Precautions   Weight Bearing Precautions Per Order Yes   LLE Weight Bearing Per Order NWB   Other Precautions Chair Alarm;WBS;Multiple lines;Telemetry; Fall Risk   General   Chart Reviewed Yes   Family/Caregiver Present No   Cognition   Overall Cognitive Status WFL   Subjective   Subjective Pt agrees to participate   Transfers   Sit to Stand 4  Minimal assistance   Additional items Assist x 1;Verbal cues   Stand to Sit 4  Minimal assistance   Additional items Assist x 1;Verbal cues   Stand pivot 4  Minimal assistance   Additional items Assist x 1;Verbal cues   Ambulation/Elevation   Gait pattern Excessively slow; Short stride  (Used leg sling for NWB LLE)   Gait Assistance 4  Minimal assist   Additional items Assist x 1   Assistive Device Rolling walker   Distance 8 feet x 2 trials   Balance   Static Sitting Normal   Dynamic Sitting Good   Static Standing Fair -   Dynamic Standing Poor +   Ambulatory Poor +   Endurance Deficit   Endurance Deficit Yes   Endurance Deficit Description orthostasis, fatigue and weakness   Activity Tolerance   Activity Tolerance Patient limited by fatigue   Nurse [address] to see per RN Holly   Exercises   TKR Sitting;20 reps;AROM; Bilateral   Assessment   Prognosis Fair   Problem List Decreased strength;Decreased endurance; Impaired balance;Decreased mobility;Orthopedic restrictions   Assessment Pt is making slow progress with use of rolling walker  Utilized leg sling today with LLE  Pt reported pain due to [employer]  Attempted extra padding however pt reported pain upon knee WB  Spoke with Raul, PT  Will discontinue use of leg sling  Cues required for WB LLE during transfers  Pt was not comfortable with using leg sling as well  Will assess safest transfer ability moving forward    Pt would benefit from continued physical therapy to maximize functional mobility and safety  Barriers to Discharge Decreased caregiver support   Goals   Patient Goals None stated   STG Expiration Date 04/08/18   Treatment Day 1   Plan   Treatment/Interventions Functional transfer training;LE strengthening/ROM; Therapeutic exercise; Endurance training;Patient/family training;Bed mobility;Gait training   Progress Slow progress, medical status limitations   PT Frequency 5x/wk   Recommendation   Recommendation Other (Comment)  (Rehab)   Equipment Recommended Wheelchair     Andrei Heart, PTA

## 2018-03-26 NOTE — ASSESSMENT & PLAN NOTE
· With unclear systolic component, patient ejection fraction 45% on recent stress test  · Patient with increased lower extremity edema, trace edema at the sacral area  · Patient remains positive several L despite being on Lasix 80 mg IV t i d , changed to Bumex 2 mg IV t i d    · Strict I&O  · Daily weight  · Appreciated cardiology recommendation, no change in therapy today  · Daily BMP while on IV diuretics

## 2018-03-26 NOTE — OCCUPATIONAL THERAPY NOTE
Orders received  Chart reviewed  Pt is planned for CABG on Wednesday  Will hold initial OT eval at this time and please re-consult post-op   D/C OT    Elizabeth Cordoba MOT, OTR/L

## 2018-03-26 NOTE — ASSESSMENT & PLAN NOTE
· A1c few days ago 9 4, diabetes uncontrolled with multiple complications, vascular and neurological  · Appreciated endocrinologist input, insulin adjustment as per consultant  · Monitor closely

## 2018-03-26 NOTE — PROGRESS NOTES
Cardiology Progress Note - Gayatri Henderson 61 y o  male MRN: [id number]    Unit/Bed#: [facility] 412-01 Encounter: [id number]        Subjective:    No significant events overnight  Concerned about surgery  Review of Systems   Cardiovascular: Positive for leg swelling  Negative for chest pain and palpitations  Respiratory: Negative for shortness of breath  Objective:   Vitals: Blood pressure 96/57, pulse 81, temperature 97 8 °F (36 6 °C), temperature source Oral, resp  rate 18, height 6' 1" (1 854 m), weight 119 kg (262 lb 12 6 oz), SpO2 97 %  , Body mass index is 34 67 kg/m² , Orthostatic Blood Pressures    Flowsheet Row Most Recent Value   Blood Pressure  96/57 [Map 71] filed at 2018 3890   Patient Position - Orthostatic VS  Sitting filed at 2018 8011         Systolic (84DVM), SCB:035 , Min:82 , MLZ:694     Diastolic (45PMD), J, Min:49, Max:72      Intake/Output Summary (Last 24 hours) at 18 0958  Last data filed at 18 0749   Gross per 24 hour   Intake          6383 34 ml   Output             1975 ml   Net          4408 34 ml     Weight (last 2 days)     Date/Time   Weight    18 0659  119 (262 79)    18 0600  121 (265 7)              Telemetry Review: NSR    Physical Exam   Cardiovascular: Normal rate, regular rhythm and normal heart sounds  Exam reveals no gallop and no friction rub  No murmur heard  2+ B LE edema   Pulmonary/Chest: Breath sounds normal  He has no wheezes  He has no rales           Laboratory Results:        CBC with diff:   Results from last 7 days  Lab Units 18  0431 18  0544 18  0514 18  0455   WBC Thousand/uL 9 87 8 71 7 31 7 98   HEMOGLOBIN g/dL 11 8* 11 9* 11 4* 10 8*   HEMATOCRIT % 36 0* 36 4* 35 0* 33 9*   MCV fL 85 85 86 86   PLATELETS Thousands/uL 286 291 271 270   MCH pg 27 8 27 7 27 9 27 3   MCHC g/dL 32 8 32 7 32 6 31 9   RDW % 14 1 13 8 13 3 13 1   MPV fL 10 0 9 7 9 8 10 2   NRBC AUTO /100 WBCs 0 0  --   -- CMP:  Results from last 7 days  Lab Units 18  0431 18  0544 18  0549 18  0514 18  0455   SODIUM mmol/L 134* 133* 135* 135* 135*   POTASSIUM mmol/L 3 9 4 3 4 1 4 1 4 2   CHLORIDE mmol/L 99* 100 101 100 99*   CO2 mmol/L  29 26 26 25   ANION GAP mmol/L 9 4 8 9 11   BUN mg/dL 17 12 9 11 12   CREATININE mg/dL 0 99 0 89 0 88 0 90 1 00   GLUCOSE RANDOM mg/dL 120 178* 172* 232* 221*   CALCIUM mg/dL 8 8 8 9 8 3 8 4 8 8   EGFR ml/min/1 73sq m 84 94 95 94 83         BMP:  Results from last 7 days  Lab Units 18  0431 18  0544 18  0549 18  0514 18  0455   SODIUM mmol/L 134* 133* 135* 135* 135*   POTASSIUM mmol/L 3 9 4 3 4 1 4 1 4 2   CHLORIDE mmol/L 99* 100 101 100 99*   CO2 mmol/L  29 26 26 25   BUN mg/dL 17 12 9 11 12   CREATININE mg/dL 0 99 0 89 0 88 0 90 1 00   GLUCOSE RANDOM mg/dL 120 178* 172* 232* 221*   CALCIUM mg/dL 8 8 8 9 8 3 8 4 8 8     Magnesium:   Results from last 7 days  Lab Units 18  0431 18  0544   MAGNESIUM mg/dL 2 0 2 1       Coags:   Results from last 7 days  Lab Units 18  0544   PTT seconds 29   INR  1 06       Cardiac testing:   Results for orders placed during the hospital encounter of 18   Echo complete with contrast if indicated    Narrative [address], [address]  [phone]    Transthoracic Echocardiogram  2D, M-mode, Doppler, and Color Doppler    Study date:  15-Mar-2018    Patient: Gonzales Aggarwal  MR number: [id number]  Account number: [de-identified]  : 1960  Age: 61 years  Gender: Male  Status: Inpatient  Location: Bedside  Height: 73 in  Weight: 258 lb  BP: 160/ 85 mmHg    Indications: Acute myocardial infarction      Diagnoses: I21 4 - Non-ST elevation (NSTEMI) myocardial infarction    Sonographer:  Ross Morin RDCS  Primary Physician:  Darcy Dye DO  Referring Physician:  Amarilys Finney PA-C  Group:  [facility]  Interpreting Physician:  Jaime Johnson MD    SUMMARY    LEFT VENTRICLE:  Size was at the upper limits of normal   Systolic function was normal  Ejection fraction was estimated in the range of 55 % to 60 %  There was moderate hypokinesis of the basal inferior wall(s)  Wall thickness was at the upper limits of normal     MITRAL VALVE:  There was trace regurgitation  TRICUSPID VALVE:  There was trace regurgitation  PULMONIC VALVE:  There was trace regurgitation  HISTORY: PRIOR HISTORY: Hypertension, Diabetes, Peripheral vascular disease, Elevated troponin  PROCEDURE: The procedure was performed at the bedside  This was a routine study  The transthoracic approach was used  The study included complete 2D imaging, M-mode, complete spectral Doppler, and color Doppler  The heart rate was 73 bpm,  at the start of the study  Images were obtained from the parasternal, apical, subcostal, and suprasternal notch acoustic windows  Echocardiographic views were limited due to decreased penetration and lung interference  This was a  technically difficult study  LEFT VENTRICLE: Size was at the upper limits of normal  Systolic function was normal  Ejection fraction was estimated in the range of 55 % to 60 %  There was moderate hypokinesis of the basal inferior wall(s)  Wall thickness was at the  upper limits of normal  DOPPLER: There was an increased relative contribution of atrial contraction to ventricular filling  The deceleration time of the early transmitral flow velocity was normal     RIGHT VENTRICLE: The size was normal  Systolic function was normal  Wall thickness was normal     LEFT ATRIUM: Size was at the upper limits of normal     RIGHT ATRIUM: Size was normal     MITRAL VALVE: Valve structure was normal  There was normal leaflet separation  DOPPLER: The transmitral velocity was within the normal range  There was no evidence for stenosis  There was trace regurgitation      AORTIC VALVE: The valve was trileaflet  Leaflets exhibited normal thickness and normal cuspal separation  DOPPLER: Transaortic velocity was within the normal range  There was no evidence for stenosis  There was no regurgitation  TRICUSPID VALVE: The valve structure was normal  There was normal leaflet separation  DOPPLER: The transtricuspid velocity was within the normal range  There was no evidence for stenosis  There was trace regurgitation  Pulmonary artery  systolic pressure was within the normal range  Estimated peak PA pressure was 20 mmHg  PULMONIC VALVE: Leaflets exhibited normal thickness, no calcification, and normal cuspal separation  DOPPLER: The transpulmonic velocity was within the normal range  There was trace regurgitation  PERICARDIUM: The amount of pericardial fluid appeared to be at the upper limits of normal     AORTA: The root exhibited normal size  SYSTEMIC VEINS: IVC: The inferior vena cava was not well visualized  SYSTEM MEASUREMENT TABLES    2D  Ao Diam: 3 6 cm  IVSd: 1 cm  LA Diam: 4 cm  LVIDd: 5 3 cm  LVIDs: 3 9 cm  LVPWd: 0 9 cm    CW  TR Vmax: 1 9 m/s  TR maxP 7 mmHg    PW  E': 0 m/s  E/E': 15 2  MV A [place]: 0 7 m/s  MV Dec [place]: 5 4 m/s2  MV DecT: 127 7 ms  MV E [place]: 0 7 m/s  MV E/A Ratio: 1    [employer] Accredited Echocardiography Laboratory    Prepared and electronically signed by    Bro Negro MD  Signed 15-Mar-2018 13:11:20       Results for orders placed during the hospital encounter of 18   NM myocardial perfusion spect (stress and/or rest)    Narrative [address] [place] [address]    [place], [address]  [phone]    Rest/Stress Gated SPECT Myocardial Perfusion Imaging After Regadenoson    Patient: Capo Cui  MR number: [id number]  Account number: [de-identified]  : 1960  Age: 61 years  Gender: Male  Status: Inpatient  Location: Stress lab  Height: 73 in  Weight: 258 lb  BP: 153/ 76 mmHg    Allergies: METFORMIN    Diagnosis: R94 39 - Abnormal result of other cardiovascular function study, Z01 810 - Encounter for preprocedural cardiovascular examination    Primary Physician:  Catherin Dakins, DO  Technician:  Dianne Carey  RN:  Jodee Flores RN BSN  Referring Physician:  Sarath Odonnell MD  Group:  [facility]  Report Prepared By[de-identified]  Jodee Flores RN BSN  Interpreting Physician:  Sarath Odonnell MD    INDICATIONS: Detection of Coronary artery disease  Pre-operative risk assessment  HISTORY: The patient is a 60year old  male  Chest pain status: no chest pain  Other symptoms: decreased effort tolerance  Coronary artery disease risk factors: dyslipidemia and diabetes mellitus  Cardiovascular history:  peripheral vascular occlusive disease(PAD)  Co-morbidity: obesity  Medications: a lipid lowering agent  Previous test results: abnormal ECG and abnormal resting echocardiogram     PHYSICAL EXAM: Baseline physical exam screening: normal lung exam     REST ECG: Normal sinus rhythm  There was 1-2 mm ST depression in leads II, III and aVF  PROCEDURE: The study was performed in the stress lab  A regadenoson infusion pharmacologic stress test was performed  Gated SPECT myocardial perfusion imaging was performed after stress and at rest  Systolic blood pressure was 153 mmHg, at  the start of the study  Diastolic blood pressure was 76 mmHg, at the start of the study  The heart rate was 78 bpm, at the start of the study  IV double checked  Regadenoson protocol:  HR bpm SBP mmHg DBP mmHg Symptoms  Baseline 78 153 76 none  Immediate 86 105 64 moderate dyspnea  3 min 81 -- -- subsiding  5 min 82 115 64 none  No medications or fluids given  STRESS SUMMARY: Duration of pharmacologic stress was 3 min  Maximal heart rate during stress was 86 bpm  The rate-pressure product for the peak heart rate and blood pressure was 90215  There was no chest pain during stress   The stress test  was terminated due to protocol completion  Pre oxygen saturation: 97 %  Peak oxygen saturation: 98 %  Arrhythmia during stress: isolated atrial premature beats  The stress ECG was non-diagnostic due to resting ST/T wave abnormality  ISOTOPE ADMINISTRATION:  Resting isotope administration Stress isotope administration  Agent Tetrofosmin Tetrofosmin  Dose 15 2 mCi 48 8 mCi  Date 03/22/2018 03/22/2018  Injection time 10:34 11:40  Imaging time 11:08 12:26  Injection-image interval 34 min 46 min    The radiopharmaceutical was injected at the peak effect of pharmacologic stress  MYOCARDIAL PERFUSION IMAGING:  The image quality was good  The left ventricle was mildly dilated  The TID ratio was 1 28  PERFUSION DEFECTS:  -  There was a large, moderately severe, partially reversible myocardial perfusion defect of the entire inferolateral wall  GATED SPECT:  The calculated left ventricular ejection fraction was 45 %  Left ventricular ejection fraction was mildly decreased by visual estimate  There was moderately reduced myocardial thickening and motion of the inferior wall and lateral wall of  the left ventricle  SUMMARY:  -  Stress results: There was no chest pain during stress  -  ECG conclusions: The stress ECG was non-diagnostic due to resting ST/T wave abnormality   -  Perfusion imaging: There was a large, moderately severe, partially reversible myocardial perfusion defect of the entire inferolateral wall  -  Gated SPECT: The calculated left ventricular ejection fraction was 45 %  Left ventricular ejection fraction was mildly decreased by visual estimate  There was moderately reduced myocardial thickening and motion of the inferior wall and  lateral wall of the left ventricle  IMPRESSIONS: Abnormal study after pharmacologic stress      Prepared and signed by    Lizet Israel MD  Signed 03/22/2018 85:35:58         Meds/Allergies     Current Facility-Administered Medications:  acetaminophen 650 mg Oral Q6H PRN Quinten Thorpe Elizabeth Metcalf MD    aspirin 325 mg Oral Daily Kerwin Ng MD    atorvastatin 40 mg Oral Daily Kerwin Ng MD    bisacodyl 10 mg Rectal Daily PRN Bayron Harris MD    bumetanide 2 mg Intravenous TID (diuretic) Bayron Harris MD    calcium carbonate 500 mg Oral Daily PRN Kerwin Ng MD    cefTRIAXone 1,000 mg Intravenous Q24H Kerwin Ng MD Last Rate: 1,000 mg (03/25/18 1917)   collagenase  Topical Daily Kerwin Ng MD    docusate sodium 100 mg Oral BID Kerwin Ng MD    enoxaparin 40 mg Subcutaneous Daily Kerwin Ng MD    insulin glargine 65 Units Subcutaneous HS Evelyn Tay MD    insulin lispro 1-6 Units Subcutaneous HS Evelyn Tay MD    insulin lispro 1-6 Units Subcutaneous 0200 Evelyn Tay MD    insulin lispro 2-12 Units Subcutaneous TID AC Evelyn Tay MD    insulin lispro 24 Units Subcutaneous TID With Meals Evelyn Tay MD    lactulose 20 g Oral 4x Daily Bayron Harris MD    lactulose 200 g Rectal BID PRN Kerwin Ng MD    LORazepam 0 5 mg Oral BID PRN Kerwin Ng MD    metoclopramide 5 mg Oral Daily Kerwin Ng MD    metoprolol 2 5 mg Intravenous Q6H PRN Evelyn Jarquin PA-C    metroNIDAZOLE 500 mg Intravenous Harpreet Green MD Last Rate: 500 mg (03/26/18 0254)   morphine injection 2 mg Intravenous Q4H PRN Kerwin Ng MD    mupirocin 1 application Nasal [address] [place] PA-C    ondansetron 4 mg Intravenous Q6H PRN Kerwin Ng MD         Facility-Administered Medications Prior to Admission   Medication    silver sulfadiazine (SILVADENE,SSD) 1 % cream     Prescriptions Prior to Admission   Medication    atorvastatin (LIPITOR) 40 mg tablet    insulin aspart protamine-insulin aspart (NOVOLOG MIX 70/30 FLEXPEN) 100 Units/mL SUPN    LANTUS SOLOSTAR injection pen 100 units/mL    LORazepam (ATIVAN) 0 5 mg tablet    metoclopramide (REGLAN) 5 mg tablet    midodrine (PROAMATINE) 5 mg tablet    torsemide (DEMADEX) 10 mg tablet       Assessment:  Principal Problem:    Triple vessel coronary artery disease  Active Problems:    Anxiety and depression    Benign essential HTN    Diabetic autonomic neuropathy associated with type 2 diabetes mellitus (HCC)    PAD (peripheral artery disease) ([facility])    Non-healing wound of left heel    Class 2 obesity due to excess calories with serious comorbidity and body mass index (BMI) of 35 0 to 35 9 in adult    Acute on chronic diastolic heart failure ([facility])    Disease of cardiovascular system      Impression:  1  Type II NSTEMI - multivessel CAD - plan for CABG on 3/28  2  Acute on chronic diastolic heart failure - diuresing on bumex  3  PAD - revascularization after CABG  4  Autonomic neuropathy      Plan:  1  Continue diuretics  2  Await surgery

## 2018-03-26 NOTE — SOCIAL WORK
MCG Guide Used for Initial Round: MI  Optimal GLOS: 2  Hospital Day: 3 days  DC Readiness:   Goal Length of Stay: 2 days   Note: Goal Length of Stay assumes optimal recovery, decision making, and care  Patients may be discharged to a lower level of care (either later than or sooner than the goal) when it is appropriate for their clinical status and care needs  Discharge Readiness  Return to top of Myocardial Infarction RRG - [facility]   Discharge readiness is indicated by patient meeting Recovery Milestones, including ALL of the following:   Hemodynamic stability   Dangerous arrhythmia absent   Chest pain, dyspnea, or anginal equivalent absent   Oxygen absent   No evidence of bleeding or recurrent myocardial ischemia   Vascular access site without evidence of infection, aneurysm, or growing hematoma   Renal function at baseline or acceptable for next level of care   Ambulatory   Oral hydration, medications, and diet   Discharge plans and education understood    Identified Barriers: triple vessel disease, plan CABG on 3/28/18, diabetic neuropathy, PAD, non-healing wound left heel, obesity, CHF, NSTEMI, needs left leg femoral-popliteal bypass, will need inpt rehab at discharge  Surgical revascularization via CABG  Expect brief stay extension  See Coronary Artery Bypass Graft (CABG)ISC guideline  Heart failure (eg, pulmonary edema)   Anticipate diuretic, afterload reduction, and inotropic intervention as indicated  Pulmonary artery catheter, intra-aortic balloon pump, inotropic and vasopressor agents, ventricular assist device, noninvasive ventilation, or continuous positive airway pressure may be required for cardiogenic shock  Expect brief stay extension    Discussion Date (Time): 03/26/18 with Dr José Manuel Abebe

## 2018-03-26 NOTE — SOCIAL WORK
Spoke with pt again re  Acute rehab  Referral made to [facility]  Spoke with mee who will check benefits  Plan CABG on 3/28/18

## 2018-03-26 NOTE — PROGRESS NOTES
Progress Note - Cardiothoracic Surgery   Russel Amin 61 y o  male MRN: [id number]  Unit/Bed#: [facility] 412-01 Encounter: [id number]    24 Hour Events: No events overnight  No complaints this morning  OOB to chair without issues        Medications:   Scheduled Meds:  Current Facility-Administered Medications:  acetaminophen 650 mg Oral Q6H PRN Maru Slaazar MD    aspirin 325 mg Oral Daily Maru Salazar MD    atorvastatin 40 mg Oral Daily Maru Salazar MD    bisacodyl 10 mg Rectal Daily PRN Vivian Marrufo MD    bumetanide 2 mg Intravenous TID (diuretic) Vivian Marrufo MD    calcium carbonate 500 mg Oral Daily PRN Maru Salazar MD    cefTRIAXone 1,000 mg Intravenous Q24H Maru Salazar MD Last Rate: 1,000 mg (03/25/18 1917)   collagenase  Topical Daily Maru Salazar MD    docusate sodium 100 mg Oral BID Maru Salazar MD    enoxaparin 40 mg Subcutaneous Daily Maru Salazar MD    insulin glargine 65 Units Subcutaneous HS Butler Lanes, MD    insulin lispro 1-6 Units Subcutaneous HS Butler Lanes, MD    insulin lispro 1-6 Units Subcutaneous 0200 Butler Lanes, MD    insulin lispro 2-12 Units Subcutaneous TID AC Butler Lanes, MD    insulin lispro 24 Units Subcutaneous TID With Meals Butler Lanes, MD    lactulose 20 g Oral 4x Daily Vivian Marrufo MD    lactulose 200 g Rectal BID PRN Maru Salazar MD    LORazepam 0 5 mg Oral BID PRN Maru Salazar MD    metoclopramide 5 mg Oral Daily Maru Salazar MD    metoprolol 2 5 mg Intravenous Q6H PRN Marleny Sheth PA-C    metroNIDAZOLE 500 mg Intravenous Betsy Lerner MD Last Rate: 500 mg (03/26/18 0254)   morphine injection 2 mg Intravenous Q4H PRN Maru Salazar MD    mupirocin 1 application Nasal [address] Malissa Sheppard PA-C    ondansetron 4 mg Intravenous Q6H PRN Maru Salazar MD      Continuous Infusions:     Results:     Results from last 7 days  Lab Units 03/26/18  0431 03/25/18  0544 03/22/18  0514   WBC Thousand/uL 9 87 8 71 7 31   HEMOGLOBIN g/dL 11 8* 11 9* 11 4*   HEMATOCRIT % 36 0* 36 4* 35 0*   PLATELETS Thousands/uL 286 291 271       Results from last 7 days  Lab Units 03/26/18  0431 03/25/18  0544 03/23/18  0549   SODIUM mmol/L 134* 133* 135*   POTASSIUM mmol/L 3 9 4 3 4 1   CHLORIDE mmol/L 99* 100 101   CO2 mmol/L 26 29 26   BUN mg/dL 17 12 9   CREATININE mg/dL 0 99 0 89 0 88   GLUCOSE RANDOM mg/dL 120 178* 172*   CALCIUM mg/dL 8 8 8 9 8 3       Results from last 7 days  Lab Units 03/25/18  0544   INR  1 06   PTT seconds 29       Studies:   Cardiac Catheterization:   CORONARY CIRCULATION:  Mid LAD: There was a diffuse 70 % stenosis  Proximal circumflex: There was a 80 % stenosis  Mid circumflex: There was a 80 % stenosis  2nd obtuse marginal: There was a 75 % stenosis  Proximal RCA: There was a 99 % stenosis  Mid RCA: There was a 100 % stenosis  Echocardiogram:   SUMMARY     LEFT VENTRICLE:  Size was at the upper limits of normal   Systolic function was normal  Ejection fraction was estimated in the range of 55 % to 60 %  There was moderate hypokinesis of the basal inferior wall(s)  Wall thickness was at the upper limits of normal      MITRAL VALVE:  There was trace regurgitation      TRICUSPID VALVE:  There was trace regurgitation      PULMONIC VALVE:  There was trace regurgitation  Carotid artery duplex: vascular us at bedside for imaging     Greater saphenous vein mapping: Adequate conduit for CABG    Vitals:   Vitals:    03/25/18 2311 03/26/18 0302 03/26/18 0659 03/26/18 0822   BP: 109/57 130/68  96/57   BP Location: Left arm Right arm  Left arm   Pulse: 78 74  81   Resp: 18 18 18   Temp: 97 8 °F (36 6 °C) 98 °F (36 7 °C)  97 8 °F (36 6 °C)   TempSrc: Oral Oral  Oral   SpO2: 96% 97%  97%   Weight:   119 kg (262 lb 12 6 oz)    Height:           Physical Exam:  HEENT/NECK:  PERRLA  No jugular venous distention  Cardiac: Regular rate and rhythm    Pulmonary:  Breath clear bilaterally  Abdomen:  Non-tender, Non-distended  Positive bowel sounds  Lower extremities: Extremities warm/dry  Boot to left foot  Neuro: Alert and oriented X 3  Sensation is grossly intact  No focal deficits  Skin: Warm/Dry, without rashes or lesions      Assessment:  Patient Active Problem List   Diagnosis    Anxiety and depression    Benign essential HTN    Diabetic autonomic neuropathy associated with type 2 diabetes mellitus ([place] [address] )    Diabetic neuropathy, type II diabetes mellitus ([place] [address] )    Hyperlipidemia    Vitamin D deficiency    PAD (peripheral artery disease) ([place] [address] )    T wave inversion in EKG    Non-healing wound of left heel    Orthostatic hypotension    Diabetic gastroparesis (HCC)    Class 2 obesity due to excess calories with serious comorbidity and body mass index (BMI) of 35 0 to 35 9 in adult    Triple vessel coronary artery disease    Acute on chronic diastolic heart failure (HCC)    Non-ST elevation myocardial infarction (NSTEMI), type 2 ([place] [address] )    Disease of cardiovascular system       Plan:  Patient agreeable to proceed with surgery;   Continue Mupirocin 2% nasal ointment q 12 hrs  Continue topical chlorhexidine bath and mouth rise  CHX shower tonight and tomorrow in preparation for surgery   Preoperative oxygen, beta blocker, insulin, and antibiotics ordered coronary artery bypass grafting scheduled for Wednesday     SIGNATURE: Harvinder Bledsoe PA-C  DATE: March 26, 2018  TIME: 10:01 AM

## 2018-03-26 NOTE — ASSESSMENT & PLAN NOTE
· Patient evaluated by Podiatry, patient evaluated by vascular surgery  · Status post failed arteriogram with attempted to revascularization on the left, status post debridement of the left heel by Podiatry  · Continue with Rocephin and Flagyl for a total of 7 days, day 4/7  · Previous images without osteomyelitis  · Local care as per Podiatry  · Vascular care as per PAD

## 2018-03-26 NOTE — PROGRESS NOTES
Progress Note - Yvonne Aguilar 1960, 61 y o  male MRN: [id number]    Unit/Bed#: [facility] 412-01 Encounter: [id number]    Primary Care Provider: Rebeca Hudson DO   Date and time admitted to hospital: 3/23/2018  5:21 PM        Other constipation   Assessment & Plan    · Patient had a small bowel movement yesterday, continue with current regimen and monitor        Acute on chronic diastolic heart failure (RUST 75 )   Assessment & Plan    · With unclear systolic component, patient ejection fraction 45% on recent stress test  · Patient with increased lower extremity edema, trace edema at the sacral area  · Patient remains positive several L despite being on Lasix 80 mg IV t i d , changed to Bumex 2 mg IV t i d    · Strict I&O  · Daily weight  · Appreciated cardiology recommendation, no change in therapy today  · Daily BMP while on IV diuretics        Class 2 obesity due to excess calories with serious comorbidity and body mass index (BMI) of 35 0 to 35 9 in adult   Assessment & Plan    · Discuss with primary care physician options for weight loss        Non-healing wound of left heel   Assessment & Plan    · Patient evaluated by Podiatry, patient evaluated by vascular surgery  · Status post failed arteriogram with attempted to revascularization on the left, status post debridement of the left heel by Podiatry  · Continue with Rocephin and Flagyl for a total of 7 days, day 4/7  · Previous images without osteomyelitis  · Local care as per Podiatry  · Vascular care as per PAD        PAD (peripheral artery disease) (RUST 75 )   Assessment & Plan    · Patient evaluated by vascular surgery, unsuccessful angiographic left popliteal recanalization with IR on March 19  · Patient will be scheduled for femoropopliteal bypass when he recovered from CABG, possibly within a month from cardiac surgery  · Vascular surgery team has signed off        Diabetic autonomic neuropathy associated with type 2 diabetes mellitus [facility])   Assessment & Plan · A1c few days ago 9 4, diabetes uncontrolled with multiple complications, vascular and neurological  · Appreciated endocrinologist input, insulin adjustment as per consultant  · Monitor closely        Benign essential HTN   Assessment & Plan    · Continue with metoprolol IV p r n , so far patient has not required any inpatient agent on a scheduled fashion  · Monitor         Anxiety and depression   Assessment & Plan    · Continue with p r n  Ativan  · mood appears stable        * Triple vessel coronary artery disease   Assessment & Plan    · Patient was [facility] in [facility] for chest pain and nonhealing wound of the left lower extremity  · Underwent cardiac catheterization, found to have triple-vessel disease  · Continue with current medical management including aspirin, statin  · For CABG on Wednesday with CT surgery team          VTE Pharmacologic Prophylaxis: yes  Pharmacologic: Enoxaparin (Lovenox)  Mechanical VTE Prophylaxis in Place: Yes     Patient Centered Rounds: I have performed bedside rounds with nursing staff today      Discussions with Specialists or Other Care Team Provider:  None yet today     Education and Discussions with Family / Patient:  Patient     Time Spent for Care: 40 minutes   More than 50% of total time spent on counseling and coordination of care as described above      Current Length of Stay: 3 day(s)     Current Patient Status: Inpatient   Certification Statement: The patient will continue to require additional inpatient hospital stay due to Please refer to above     Discharge Plan:  To be determined, likely rehabilitation     Code Status: Level 1 - Full Code    Subjective:   Patient denies complaints  He had a small bowel movement yesterday  Objective:     Vitals:   Temp (24hrs), Av 8 °F (36 6 °C), Min:97 7 °F (36 5 °C), Max:98 °F (36 7 °C)    HR:  [74-83] 81  Resp:  [18] 18  BP: ()/(49-72) 96/57  SpO2:  [96 %-98 %] 97 %  Body mass index is 34 67 kg/m²       Input and Output Summary (last 24 hours): Intake/Output Summary (Last 24 hours) at 03/26/18 1523  Last data filed at 03/26/18 1444   Gross per 24 hour   Intake          6433 34 ml   Output              700 ml   Net          5733 34 ml       Physical Exam:     Physical Exam   Constitutional: He is oriented to person, place, and time  He appears well-developed  Cardiovascular: Normal rate, regular rhythm and normal heart sounds  Exam reveals no friction rub  No murmur heard  Pulmonary/Chest: Effort normal  No respiratory distress  He has no wheezes  He has no rales  Abdominal: Soft  He exhibits no distension  There is no tenderness  There is no rebound  Trace edema in the lower portion of abdominal wall   Musculoskeletal: He exhibits edema (Plus two edema bilateral lower extremities up to upper portion of the thigh)  Neurological: He is alert and oriented to person, place, and time  He exhibits normal muscle tone  Skin: Skin is warm  Psychiatric: He has a normal mood and affect  Additional Data:     Labs:      Results from last 7 days  Lab Units 03/26/18  0431   WBC Thousand/uL 9 87   HEMOGLOBIN g/dL 11 8*   HEMATOCRIT % 36 0*   PLATELETS Thousands/uL 286   NEUTROS PCT % 69   LYMPHS PCT % 21   MONOS PCT % 7   EOS PCT % 3       Results from last 7 days  Lab Units 03/26/18  0431   SODIUM mmol/L 134*   POTASSIUM mmol/L 3 9   CHLORIDE mmol/L 99*   CO2 mmol/L 26   BUN mg/dL 17   CREATININE mg/dL 0 99   CALCIUM mg/dL 8 8   GLUCOSE RANDOM mg/dL 120       Results from last 7 days  Lab Units 03/25/18  0544   INR  1 06       * I Have Reviewed All Lab Data Listed Above  * Additional Pertinent Lab Tests Reviewed:  All Labs Within Last 24 Hours Reviewed    Imaging:    Imaging Reports Reviewed Today Include:  None  Imaging Personally Reviewed by Myself Includes:  None    Recent Cultures (last 7 days):           Last 24 Hours Medication List:     Current Facility-Administered Medications:  acetaminophen 650 mg Oral Q6H PRN Reg Rodney MD    aspirin 325 mg Oral Daily Reg Rodney MD    atorvastatin 40 mg Oral Daily Reg Rodney MD    bisacodyl 10 mg Rectal Daily PRN Viktoria Navas MD    bumetanide 2 mg Intravenous TID (diuretic) Viktoria Navas MD    calcium carbonate 500 mg Oral Daily PRN Reg Rodney MD    cefTRIAXone 1,000 mg Intravenous Q24H Reg Rodney MD Last Rate: 1,000 mg (03/25/18 1917)   chlorhexidine 15 mL Mouth/Throat Q12H [facility] Anamika Stephens PA-C    collagenase  Topical Daily Reg Rodney MD    docusate sodium 100 mg Oral BID Reg Rodney MD    enoxaparin 40 mg Subcutaneous Daily Dania Velasquez PA-C    insulin glargine 65 Units Subcutaneous HS Antonieta Knight MD    insulin lispro 1-6 Units Subcutaneous HS Antonieta Knight MD    insulin lispro 1-6 Units Subcutaneous 0200 Antonieta Knight MD    insulin lispro 2-12 Units Subcutaneous TID AC Antonieta Knight MD    insulin lispro 24 Units Subcutaneous TID With Meals Antonieta Knight MD    lactulose 20 g Oral 4x Daily Viktoria Navas MD    lactulose 200 g Rectal BID PRN Reg Rodney MD    LORazepam 0 5 mg Oral BID PRN Reg Rodney MD    metoclopramide 5 mg Oral Daily Reg Rodney MD    metoprolol 2 5 mg Intravenous Q6H PRN Balta John PA-C    metroNIDAZOLE 500 mg Intravenous Lai Colindres MD Last Rate: Stopped (03/26/18 1230)   morphine injection 2 mg Intravenous Q4H PRN Reg Rodney MD    mupirocin 1 application Nasal V99Q [facility] Malissa Sheppard PA-C    ondansetron 4 mg Intravenous Q6H PRN Reg Rodney MD         Today, Patient Was Seen By: Viktoria Navas MD    ** Please Note: Dragon 360 Dictation voice to text software may have been used in the creation of this document   **

## 2018-03-26 NOTE — CASE MANAGEMENT
Susanna Osgood, RN Registered Nurse Signed   Case Management Date of Service: 3/24/2018  4:36 PM         []Hide copied text  Initial Clinical Review     Thank you,  [address] [facility] in the [facility] by Preston Ty for 2017  Network Utilization Review Department  Phone: [phone]; Fax [phone]  ATTENTION: The Network Utilization Review Department is now centralized for our 7 Facilities  Make a note that we have a new phone and fax numbers for our Department  Please call with any questions or concerns to [phone] and carefully follow the prompts so that you are directed to the right person  All voicemails are confidential  Fax any determinations, approvals, denials, and requests for initial or continue stay review clinical to [phone]  Due to HIGH CALL volume, it would be easier if you could please send faxed requests to expedite your requests and in part, help us provide discharge notifications faster         Admission: Date/Time/Statement: 3/23/18 @ 1721            Orders Placed This Encounter   Procedures    Inpatient Admission       Standing Status:   Standing       Number of Occurrences:   1       Order Specific Question:   Admitting Physician       Answer:   Akhil Anna       Order Specific Question:   Level of Care       Answer:   Med Surg [16]       Order Specific Question:   Estimated length of stay       Answer:   More than 2 Midnights       Order Specific Question:   Certification       Answer:   I certify that inpatient services are medically necessary for this patient for a duration of greater than two midnights  See H&P and MD Progress Notes for additional information about the patient's course of treatment          ED: Date/Time/Mode of Arrival:  Tx from [address] [place] [address] 3/23     Chief Complaint: Abnormal cardiac catheterization     History of Illness:  56 y  o  male who presents with abnormal cardiac catheterization showing triple-vessel disease   The patient initially presented to [place] [address] on 03/14/2018 with left lower extremity wound and chest pain   Patient has a history significant for type 2 diabetes which is poorly controlled(HBAic 9 4)  Reanna Brennan also has history of diabetic gastroparesis, severe peripheral neuropathy secondary to diabetes, hyperlipidemia, and chronic left lower extremity/heel ulcer   During his stay at [place] [address] he also had non ST elevation MI for which he underwent cardiology evaluation  Reanna Brennan had a positive stress test and underwent cardiac catheterization today which showed severe triple-vessel disease  He was transferred here for cardiothoracic surgery evaluation  [facility] his stay in the hospital the patient was worked up for his chronic left heel ulcer  Reanna Bernnan had an abdominal aortogram with unsuccessful attempt to bypass occlusion of left popliteal artery   Vascular surgery recommended formal surgical revascularization with fem popliteal bypass after cardiac issues were resolved  Ángel Montiel was maintained on intravenous antibiotics(ceftriaxone and Flagyl day# 9 )  Patient was kept of beta-blocker secondary to multiple episodes of hypotension which was thought to be secondary to diabetic autonomic neuropathy   He was maintained on insulin for uncontrolled diabetes       Vital Signs:  03/23 0701  03/24 0700 03/24 0701  03/24 1639   Most Recent       Temperature (°F) 97  598 1 97 598  4   98 4 (36 9)     Pulse 7282 7475   75     Respirations 1618 18   18     Blood Pressure 137/68185/82 121/62136/70   136/70    SpO2 (%) 9599 9698   96           Abnormal Labs/Diagnostic Test Results:   Lab Units 03/22/18  0514   03/19/18  0556   WBC Thousand/uL 7 31  < > 8 27   HEMOGLOBIN g/dL 11 4*  < > 11 7*   HEMATOCRIT % 35 0*  < > 35 4*   PLATELETS Thousands/uL 271  < > 271   NEUTROS PCT %  --   --  72   LYMPHS PCT %  --   --  18   MONOS PCT %  --   --  7   EOS PCT %  --   --  2      Lab Units 03/23/18  0549   SODIUM mmol/L 135*   POTASSIUM mmol/L 4 1   CHLORIDE mmol/L 101   CO2 mmol/L 26   BUN mg/dL 9   CREATININE mg/dL 0 88   CALCIUM mg/dL 8 3   GLUCOSE RANDOM mg/dL 172*         Past Medical/Surgical History:        Past Medical History:   Diagnosis Date    Anemia      Anxiety and depression      Autonomic neuropathy      Cataract      Diabetes mellitus ([facility] )      Diabetic autonomic neuropathy associated with type 2 diabetes mellitus (HCC)      Gastroparesis due to DM ([place] [address] )      History of DVT (deep vein thrombosis)      HTN (hypertension)      Hyperlipidemia      Non healing left heel wound      Obesity      Orthostatic hypotension           Admitting Diagnosis: CAD (coronary artery disease) [I25 10]     Age/Sex: 61 y o  male     Assessment/Plan:    Hospital Problem List:      Principal Problem:    Triple vessel coronary artery disease  Active Problems:    Anxiety and depression    Benign essential HTN    Diabetic autonomic neuropathy associated with type 2 diabetes mellitus ([place] [address] )    Hyperlipidemia    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 ([facility])    PAD (peripheral artery disease) ([facility])    Non-healing wound of left heel    Morbid obesity ([facility])      Plan for the Primary Problem(s):  · Severe triple-vessel coronary artery disease by cardiac catheterization   Continue with aspirin and statin therapy   Cardiothoracic surgery consultation will be obtained for assessment of coronary artery bypass graft grafting   Nuclear scan revealed left ventricular ejection fraction at 45%   · Nonhealing left heel ulcer per severe underlying peripheral arterial disease  Recent left lower extremity arteriogram reveals extensive popliteal disease   Patient will need vascular intervention once cardiac status is stable   Vascular surgery consultation will be obtained  María Botello currently on IV antibiotics   Continue with wound care    · Uncontrolled type 2 diabetes with severe peripheral neuropathy   Endocrine consultation will be obtained   Continue with current insulin regimen  · Chronic peripheral edema likely secondary to chronic congestive heart failure   Diuretics on hold in view of recent cardiac catheterization  · Dyslipidemia   Continue with statin therapy  · Hypertension  Dangelo Henning has been off antihypertensive medication secondary to multiple episodes of orthostatic hypotension the likely secondary to underlying diabetic autonomic neuropathy   Continue monitor blood pressure closely and treat with antihypertensive medications for sustained systolic blood [id number]>067SP Hg    ·    VTE Prophylaxis: Enoxaparin (Lovenox)  /   Code Status: FULL CODEPOLST: POLST form is not discussed and not completed at this time      Anticipated Length of Stay: Dangelo Henning will be admitted on an Inpatient basis with an anticipated length of stay of  > 2 midnights    Justification for Hospital Stay:  cardiothoracic surgery evaluation forcoronary artery bypass surgery        Admission Orders:  Admit to Tele unit  Telem  Consult CT surgery, vascular surgery, heart failure service, podiatry, endocrinology  Fingerstick glucose checks qid w/ ssi  SCD's  Cardiac diet  PT/OT evals     Scheduled Meds:   Current Facility-Administered Medications:  acetaminophen 650 mg Oral Q6H PRN Jessica Bernstein MD     aspirin 325 mg Oral Daily Jessica Bernstein MD     atorvastatin 40 mg Oral Daily Jessica Bernstein MD     calcium carbonate 500 mg Oral Daily PRN Jessica Bernstein MD     cefTRIAXone 1,000 mg Intravenous Q24H Jessica Bernstein MD Last Rate: 1,000 mg (03/23/18 1948)   collagenase   Topical Daily Jessica Bernstein MD     docusate sodium 100 mg Oral BID Jessica Bernstein MD     enoxaparin 40 mg Subcutaneous Daily Jessica Bernstein MD     furosemide 40 mg Intravenous BID (diuretic) Radha Carlton MD     insulin glargine 60 Units Subcutaneous HS Tasha George MD     insulin lispro 1-6 Units Subcutaneous HS Philip Champion MD     [START ON 3/25/2018] insulin lispro 1-6 Units Subcutaneous 0200 Philip Champion MD     insulin lispro 2-12 Units Subcutaneous TID AC Philip Champion MD     [START ON 3/25/2018] insulin lispro 20 Units Subcutaneous TID With Meals Philip Champion MD     lactulose 20 g Oral BID Vladislav Elaine MD     lactulose 200 g Rectal BID PRN Vladislav Elaine MD     LORazepam 0 5 mg Oral BID PRN Vladislav Elaine MD     metoclopramide 5 mg Oral Daily Vladislav Elaine MD     metoprolol 2 5 mg Intravenous Q6H PRN Rustam Reardon PA-C     metroNIDAZOLE 500 mg Intravenous Q8H Vladislav Elaine MD Last Rate: 500 mg (03/24/18 1000)   morphine injection 2 mg Intravenous Q4H PRN Vladislav Elaine MD     mupirocin 1 application Nasal G83Q [address] Malissa Sheppard PA-C     ondansetron 4 mg Intravenous Q6H PRN Vladislav Elaine MD        Continuous Infusions:    PRN Meds:   acetaminophen    calcium carbonate    lactulose    LORazepam    metoprolol    morphine injection    ondansetron     Vascular surgery consult 3/23 --   Assessment:  Pt is a 57y  o  M with non-healing L foot wounds     Plan:  Recommend L femoral-BK popliteal bypass grafting  Vein mapping completed   Workup for CABG to do done first  ASA/Statin  abx   Local wound care per podiatry  F/u endocrinology  Will sign off until after CABG     CT surgery consult 3/24 --  Plan:  Risks and benefits of coronary artery bypass grafting were discussed in detail today with the patient  Vijaya Thorne understand and wish to proceed with further workup and ultimately surgical intervention   We have ordered routine preoperative laboratory and vascular studies   Pending the results of these tests, they will be scheduled for surgery early this KEVIN Poole      Vascular surgery recommends a left femoral BK popliteal bypass graft for the treatment of his femoral popliteal and tibioperoneal disease   Podiatry is following as well               Thank you,  [facility] in the [place] by Preston Ty for 2017  Network Utilization Review Department  Phone: [phone]; Fax [phone]  ATTENTION: The Network Utilization Review Department is now centralized for our 7 Facilities  Make a note that we have a new phone and fax numbers for our Department  Please call with any questions or concerns to [phone] and carefully follow the prompts so that you are directed to the right person  All voicemails are confidential  Fax any determinations, approvals, denials, and requests for initial or continue stay review clinical to [phone]  Due to HIGH CALL volume, it would be easier if you could please send faxed requests to expedite your requests and in part, help us provide discharge notifications faster

## 2018-03-26 NOTE — PLAN OF CARE
Problem: PHYSICAL THERAPY ADULT  Goal: Performs mobility at highest level of function for planned discharge setting  See evaluation for individualized goals  Treatment/Interventions: Functional transfer training, LE strengthening/ROM, Therapeutic exercise, Endurance training, Patient/family training, Gait training, Bed mobility, Equipment eval/education  Equipment Recommended: Wheelchair, Florie Basket       See flowsheet documentation for full assessment, interventions and recommendations  Outcome: Progressing  Prognosis: Fair  Problem List: Decreased strength, Decreased endurance, Impaired balance, Decreased mobility, Orthopedic restrictions  Assessment: Pt is making slow progress with use of rolling walker  Utilized leg sling today with LLE  Pt reported pain due to [employer]  Attempted extra padding however pt reported pain upon knee WB  Spoke with Raul, PT  Will discontinue use of leg sling  Cues required for WB LLE during transfers  Pt was not comfortable with using leg sling as well  Will assess safest transfer ability moving forward  Pt would benefit from continued physical therapy to maximize functional mobility and safety  Barriers to Discharge: Decreased caregiver support     Recommendation: Other (Comment) (Rehab)     PT - OK to Discharge:  (to rehab once medically appropriate)    See flowsheet documentation for full assessment

## 2018-03-26 NOTE — ASSESSMENT & PLAN NOTE
· Patient was admitted in [facility] for chest pain and nonhealing wound of the left lower extremity  · Underwent cardiac catheterization, found to have triple-vessel disease  · Continue with current medical management including aspirin, statin  · For CABG on Wednesday with CT surgery team

## 2018-03-27 ENCOUNTER — ANESTHESIA EVENT (INPATIENT)
Dept: PERIOP | Facility: HOSPITAL | Age: 58
DRG: 235 | End: 2018-03-27
Payer: COMMERCIAL

## 2018-03-27 LAB
ANION GAP SERPL CALCULATED.3IONS-SCNC: 8 MMOL/L (ref 4–13)
BASOPHILS # BLD AUTO: 0.05 THOUSANDS/ΜL (ref 0–0.1)
BASOPHILS NFR BLD AUTO: 1 % (ref 0–1)
BUN SERPL-MCNC: 17 MG/DL (ref 5–25)
CALCIUM SERPL-MCNC: 8.4 MG/DL (ref 8.3–10.1)
CHLORIDE SERPL-SCNC: 98 MMOL/L (ref 100–108)
CO2 SERPL-SCNC: 27 MMOL/L (ref 21–32)
CREAT SERPL-MCNC: 0.99 MG/DL (ref 0.6–1.3)
EOSINOPHIL # BLD AUTO: 0.28 THOUSAND/ΜL (ref 0–0.61)
EOSINOPHIL NFR BLD AUTO: 3 % (ref 0–6)
ERYTHROCYTE [DISTWIDTH] IN BLOOD BY AUTOMATED COUNT: 14.2 % (ref 11.6–15.1)
GFR SERPL CREATININE-BSD FRML MDRD: 84 ML/MIN/1.73SQ M
GLUCOSE SERPL-MCNC: 105 MG/DL (ref 65–140)
GLUCOSE SERPL-MCNC: 120 MG/DL (ref 65–140)
GLUCOSE SERPL-MCNC: 137 MG/DL (ref 65–140)
GLUCOSE SERPL-MCNC: 137 MG/DL (ref 65–140)
GLUCOSE SERPL-MCNC: 143 MG/DL (ref 65–140)
GLUCOSE SERPL-MCNC: 163 MG/DL (ref 65–140)
GLUCOSE SERPL-MCNC: 169 MG/DL (ref 65–140)
HCT VFR BLD AUTO: 34.6 % (ref 36.5–49.3)
HGB BLD-MCNC: 11.1 G/DL (ref 12–17)
LYMPHOCYTES # BLD AUTO: 2.38 THOUSANDS/ΜL (ref 0.6–4.47)
LYMPHOCYTES NFR BLD AUTO: 22 % (ref 14–44)
MAGNESIUM SERPL-MCNC: 1.9 MG/DL (ref 1.6–2.6)
MCH RBC QN AUTO: 27.3 PG (ref 26.8–34.3)
MCHC RBC AUTO-ENTMCNC: 32.1 G/DL (ref 31.4–37.4)
MCV RBC AUTO: 85 FL (ref 82–98)
MONOCYTES # BLD AUTO: 0.8 THOUSAND/ΜL (ref 0.17–1.22)
MONOCYTES NFR BLD AUTO: 7 % (ref 4–12)
NEUTROPHILS # BLD AUTO: 7.42 THOUSANDS/ΜL (ref 1.85–7.62)
NEUTS SEG NFR BLD AUTO: 67 % (ref 43–75)
NRBC BLD AUTO-RTO: 0 /100 WBCS
PHOSPHATE SERPL-MCNC: 4 MG/DL (ref 2.7–4.5)
PLATELET # BLD AUTO: 273 THOUSANDS/UL (ref 149–390)
PMV BLD AUTO: 9.2 FL (ref 8.9–12.7)
POTASSIUM SERPL-SCNC: 3.9 MMOL/L (ref 3.5–5.3)
RBC # BLD AUTO: 4.06 MILLION/UL (ref 3.88–5.62)
SODIUM SERPL-SCNC: 133 MMOL/L (ref 136–145)
WBC # BLD AUTO: 10.96 THOUSAND/UL (ref 4.31–10.16)

## 2018-03-27 PROCEDURE — 85025 COMPLETE CBC W/AUTO DIFF WBC: CPT | Performed by: INTERNAL MEDICINE

## 2018-03-27 PROCEDURE — 84100 ASSAY OF PHOSPHORUS: CPT | Performed by: INTERNAL MEDICINE

## 2018-03-27 PROCEDURE — 80048 BASIC METABOLIC PNL TOTAL CA: CPT | Performed by: INTERNAL MEDICINE

## 2018-03-27 PROCEDURE — 83735 ASSAY OF MAGNESIUM: CPT | Performed by: INTERNAL MEDICINE

## 2018-03-27 PROCEDURE — 82948 REAGENT STRIP/BLOOD GLUCOSE: CPT

## 2018-03-27 PROCEDURE — 99232 SBSQ HOSP IP/OBS MODERATE 35: CPT | Performed by: INTERNAL MEDICINE

## 2018-03-27 RX ADMIN — ATORVASTATIN CALCIUM 40 MG: 40 TABLET, FILM COATED ORAL at 17:40

## 2018-03-27 RX ADMIN — METRONIDAZOLE 500 MG: 500 INJECTION, SOLUTION INTRAVENOUS at 10:55

## 2018-03-27 RX ADMIN — ASPIRIN 325 MG: 325 TABLET ORAL at 08:25

## 2018-03-27 RX ADMIN — CEFTRIAXONE 1000 MG: 1 INJECTION, SOLUTION INTRAVENOUS at 17:40

## 2018-03-27 RX ADMIN — INSULIN LISPRO 2 UNITS: 100 INJECTION, SOLUTION INTRAVENOUS; SUBCUTANEOUS at 06:24

## 2018-03-27 RX ADMIN — CHLORHEXIDINE GLUCONATE 15 ML: 1.2 RINSE ORAL at 08:24

## 2018-03-27 RX ADMIN — DOCUSATE SODIUM 100 MG: 100 CAPSULE, LIQUID FILLED ORAL at 08:24

## 2018-03-27 RX ADMIN — ENOXAPARIN SODIUM 40 MG: 40 INJECTION SUBCUTANEOUS at 08:24

## 2018-03-27 RX ADMIN — MUPIROCIN 1 APPLICATION: 20 OINTMENT TOPICAL at 08:24

## 2018-03-27 RX ADMIN — INSULIN LISPRO 1 UNITS: 100 INJECTION, SOLUTION INTRAVENOUS; SUBCUTANEOUS at 02:34

## 2018-03-27 RX ADMIN — MUPIROCIN 1 APPLICATION: 20 OINTMENT TOPICAL at 22:35

## 2018-03-27 RX ADMIN — METOCLOPRAMIDE HYDROCHLORIDE 5 MG: 10 TABLET ORAL at 08:25

## 2018-03-27 RX ADMIN — METRONIDAZOLE 500 MG: 500 INJECTION, SOLUTION INTRAVENOUS at 02:34

## 2018-03-27 RX ADMIN — SODIUM CHLORIDE 0.5 UNITS/HR: 9 INJECTION, SOLUTION INTRAVENOUS at 22:42

## 2018-03-27 RX ADMIN — METRONIDAZOLE 500 MG: 500 INJECTION, SOLUTION INTRAVENOUS at 18:32

## 2018-03-27 RX ADMIN — DOCUSATE SODIUM 100 MG: 100 CAPSULE, LIQUID FILLED ORAL at 17:40

## 2018-03-27 RX ADMIN — CHLORHEXIDINE GLUCONATE 15 ML: 1.2 RINSE ORAL at 22:35

## 2018-03-27 RX ADMIN — BUMETANIDE 2 MG: 0.25 INJECTION INTRAMUSCULAR; INTRAVENOUS at 06:18

## 2018-03-27 NOTE — PROGRESS NOTES
Progress Note - Cardiothoracic Surgery   Magui Hoang 61 y o  male MRN: [id number]  Unit/Bed#: [facility] 412-01 Encounter: [id number]      24 Hour Events: No events  No complaints  Denies major cardiac symptomatology  Still agreeable to sx, all questions addressed during encounter      Medications:   Scheduled Meds:  Current Facility-Administered Medications:  acetaminophen 650 mg Oral Q6H PRN Chris Estevez MD    aspirin 325 mg Oral Daily Chris Estevez MD    atorvastatin 40 mg Oral Daily Chris Estevez MD    bisacodyl 10 mg Rectal Daily PRN Gabriela Gonzalez MD    calcium carbonate 500 mg Oral Daily PRN Chris Estevez MD    cefTRIAXone 1,000 mg Intravenous Q24H Chris Estevez MD Last Rate: 1,000 mg (03/26/18 1749)   chlorhexidine 15 mL Mouth/Throat Q12H [address] Anamika Stephens PA-C    collagenase  Topical Daily Chris Estevez MD    docusate sodium 100 mg Oral BID Chris Estevez MD    enoxaparin 40 mg Subcutaneous Daily Luciana Gil PA-C    insulin glargine 65 Units Subcutaneous HS Steve Trevino MD    insulin lispro 1-6 Units Subcutaneous HS Steve Trevino MD    insulin lispro 1-6 Units Subcutaneous 0200 Steve Trevino MD    insulin lispro 2-12 Units Subcutaneous TID AC Steve Trevino MD    insulin lispro 24 Units Subcutaneous TID With Meals Steve Trevino MD    lactulose 20 g Oral 4x Daily Gabriela Gonzalez MD    lactulose 200 g Rectal BID PRN Chris Estevez MD    LORazepam 0 5 mg Oral BID PRN Chris Estevez MD    metoclopramide 5 mg Oral Daily Chris Estevez MD    metoprolol 2 5 mg Intravenous Q6H PRN Az Goldsmith PA-C    metroNIDAZOLE 500 mg Intravenous Gilmar Hernández MD Last Rate: Stopped (03/27/18 0304)   morphine injection 2 mg Intravenous Q4H PRN Chris Estevez MD    mupirocin 1 application Nasal Q50W [address] Malissa Sheppard PA-C    ondansetron 4 mg Intravenous Q6H PRN Chris Estevez MD      Continuous Infusions:     Results:     Results from last 7 days  Lab Units 03/27/18  0524 03/26/18  0431 03/25/18  0544   WBC Thousand/uL 10 96* 9 87 8 71   HEMOGLOBIN g/dL 11 1* 11 8* 11 9*   HEMATOCRIT % 34 6* 36 0* 36 4*   PLATELETS Thousands/uL 273 286 291       Results from last 7 days  Lab Units 03/27/18  0524 03/26/18  0431 03/25/18  0544   SODIUM mmol/L 133* 134* 133*   POTASSIUM mmol/L 3 9 3 9 4 3   CHLORIDE mmol/L 98* 99* 100   CO2 mmol/L 27 26 29   BUN mg/dL 17 17 12   CREATININE mg/dL 0 99 0 99 0 89   GLUCOSE RANDOM mg/dL 143* 120 178*   CALCIUM mg/dL 8 4 8 8 8 9       Results from last 7 days  Lab Units 03/25/18  0544   INR  1 06   PTT seconds 29       Studies:     Cardiac Cath 3/22: mid LAD 70%, prox cx 80%, mid cx 80%, OM2 75%, prox RCA 99%, mid %, Vgram 55%    TTE 3/15: EF 5560%, moderate hypokinesis of basal inferior walls, trace MR, trace TR, trace NH    Pharm stress 3/21: abnormal    Vein Mapping 3/20: good right knee to thigh, left leg good (was planning on using left leg for vascular surgery)    Carotid Ultrasound 3/27: <50% ICA stenosis b/l, antegrade flow b/l, no subclavian disease    LEADs 3/6: right YUDITH 1 3, left YUDITH 1 7, diffuse atherosclerotic disease throughout femoropopliteal arteries, findings suggests tibioperoneal disease    Angiogram LLE 3/19: no significant aortoiliac atherosclerosis disease, patent left superficial femoral artery with mild atherosclerotic changes, 2 cm distal segment with mild stenosis / chronic 5 cm segment occlusion of popliteal artery crossing the knee joint with multiple well developed collaterals reconstitute the distal popliteal artery    CXR 12/2017: no active disease      EKG 3/15: NSR with ST/T wave changes    CT scan: extensive atherosclerotic calcifications in the coronary arteries  minimal atherosclerotic calcifications in the thoracic aorta      Vitals:   Vitals:    03/26/18 1724 03/26/18 1938 03/26/18 2352 03/27/18 0313   BP: 109/73 118/61 105/70 123/63   BP Location: Left arm Left arm Left arm Left arm Pulse: 76 77 80 82   Resp:  18 18 18   Temp: 97 6 °F (36 4 °C) 98 2 °F (36 8 °C) 97 5 °F (36 4 °C) 98 1 °F (36 7 °C)   TempSrc: Oral Oral Oral Oral   SpO2: 99% 96% 92% 93%   Weight:       Height:           Physical Exam:  HEENT/NECK:  PERRLA  No jugular venous distention  Cardiac: Regular rate and rhythm  Pulmonary:  Breath clear bilaterally  Abdomen:  Non-tender, Non-distended  Positive bowel sounds  Lower extremities: Extremities warm/dry  Radial/PT/DP pulses 2+ bilaterally  Trace edema B/L  Neuro: Alert and oriented X 3  Sensation is grossly intact  No focal deficits  Skin: Warm/Dry, without rashes or lesions  Assessment:  Patient Active Problem List   Diagnosis    Anxiety and depression    Benign essential HTN    Diabetic autonomic neuropathy associated with type 2 diabetes mellitus ([facility] [address] )    Diabetic neuropathy, type II diabetes mellitus ([facility] [address] )    Hyperlipidemia    Vitamin D deficiency    [facility]    T wave inversion in EKG    Non-healing wound of left heel    Orthostatic hypotension    Diabetic gastroparesis (HCC)    Class 2 obesity due to excess calories with serious comorbidity and body mass index (BMI) of 35 0 to 35 9 in adult    Triple vessel coronary artery disease    Acute on chronic diastolic heart failure (HCC)    Non-ST elevation myocardial infarction (NSTEMI), type 2 (HCC)    Disease of cardiovascular system    Other constipation     Severe coronary artery disease;  Ongoing CABG workup    Plan:  Patient agreeable to proceed with surgery  Informed consent signed & on chart  Blood type and cross match ordered; RBC prepared  Continue Mupirocin 2% nasal ointment q 12 hrs  NPO after midnight  Continue topical chlorhexidine bath and mouth rise  Preoperative oxygen, beta blocker, insulin, and antibiotics ordered coronary artery bypass grafting scheduled for 3/28 with KEVIN Real  1st case    SIGNATURE: Carlota Moeller PA-C  DATE: March 27, 2018  TIME: 8:02 AM

## 2018-03-27 NOTE — RESPIRATORY THERAPY NOTE
RT Protocol Note  Yannick Ennis 61 y o  male MRN: [id number]  Unit/Bed#: [facility] 200-18 Encounter: [id number]    Assessment    Principal Problem:    Triple vessel coronary artery disease  Active Problems:    Anxiety and depression    Benign essential HTN    Diabetic autonomic neuropathy associated with type 2 diabetes mellitus (HCC)    PAD (peripheral artery disease) (HCC)    Non-healing wound of left heel    Class 2 obesity due to excess calories with serious comorbidity and body mass index (BMI) of 35 0 to 35 9 in adult    Acute on chronic diastolic heart failure (HCC)    Other constipation      Home Pulmonary Medications:  None      Past Medical History:   Diagnosis Date    Anemia     Anxiety and depression     Autonomic neuropathy     Cataract     Diabetes mellitus ([facility]     Diabetic autonomic neuropathy associated with type 2 diabetes mellitus ([facility] )     Last Assessed: 12/28/2017    Gastroparesis due to DM ([facility] )     History of DVT (deep vein thrombosis)     left LE    HTN (hypertension)     Hyperlipidemia     Non healing left heel wound     Obesity     Orthostatic hypotension      Social History     Social History    Marital status: /Civil Union     Spouse name: N/A    Number of children: N/A    Years of education: N/A     Social History Main Topics    Smoking status: Former Smoker     Packs/day: 1 00     Years: 12 00     Types: Cigarettes     Quit date: 3/23/1994    Smokeless tobacco: Never Used    Alcohol use No    Drug use: No    Sexual activity: Not Asked     Other Topics Concern    None     Social History Narrative    None       Subjective    Subjective Data: my breathing is fine    Objective    Physical Exam:   Assessment Type: Assess only  General Appearance: Alert, Awake  Respiratory Pattern: Normal  Chest Assessment: Chest expansion symmetrical  Bilateral Breath Sounds: Clear  R Breath Sounds: Clear  L Breath Sounds: Clear  Cough: None  O2 Device: none    Vitals:  Blood pressure 111/56, pulse 83, temperature 98 1 °F (36 7 °C), temperature source Oral, resp  rate 18, height 6' 1" (1 854 m), weight 119 kg (262 lb 12 6 oz), SpO2 98 %  Imaging and other studies: I have personally reviewed pertinent reports        O2 Device: none     Plan    Respiratory Plan: No distress/Pulmonary history        Resp Comments: pt assessed for respiratory protocol, pt has no pulmonary meds at home,, BS CTA, no sob or distress at this time, will order IS Q1 hr w/a, will assess if further respiratory needs

## 2018-03-27 NOTE — PROGRESS NOTES
Cardiology Progress Note - Rasheed Arm 61 y o  male MRN: [id number]    Unit/Bed#: [facility] 412-01 Encounter: [id number]        Subjective:    No significant events overnight  Feels well today  Review of Systems   Cardiovascular: Positive for leg swelling  Negative for chest pain and palpitations  Respiratory: Negative for shortness of breath  Objective:   Vitals: Blood pressure 122/59, pulse 81, temperature (!) 97 1 °F (36 2 °C), temperature source Oral, resp  rate 18, height 6' 1" (1 854 m), weight 119 kg (262 lb 12 6 oz), SpO2 96 %  , Body mass index is 34 67 kg/m² ,   Orthostatic Blood Pressures    Flowsheet Row Most Recent Value   Blood Pressure  122/59 filed at 03/27/2018 0810   Patient Position - Orthostatic VS  Sitting filed at 03/27/2018 1491         Systolic (29KKS), QUB:743 , Min:100 , DJL:276     Diastolic (29AXS), CFY:01, Min:58, Max:73      Intake/Output Summary (Last 24 hours) at 03/27/18 1009  Last data filed at 03/27/18 0953   Gross per 24 hour   Intake             1740 ml   Output              775 ml   Net              965 ml     Weight (last 2 days)     Date/Time   Weight    03/27/18 0600  --    Weight: pt  does not wish to stand  wants to be weighed later at 03/27/18 0600    03/26/18 0659  119 (262 79)    03/25/18 0600  121 (265 7)              Telemetry Review: NSR    Physical Exam   Cardiovascular: Normal rate, regular rhythm and normal heart sounds  Exam reveals no gallop and no friction rub  No murmur heard  2+ B LE edema   Pulmonary/Chest: Breath sounds normal  He has no wheezes  He has no rales           Laboratory Results:        CBC with diff:     Results from last 7 days  Lab Units 03/27/18  0524 03/26/18  0431 03/25/18  0544 03/22/18  0514   WBC Thousand/uL 10 96* 9 87 8 71 7 31   HEMOGLOBIN g/dL 11 1* 11 8* 11 9* 11 4*   HEMATOCRIT % 34 6* 36 0* 36 4* 35 0*   MCV fL 85 85 85 86   PLATELETS Thousands/uL 273 286 291 271   MCH pg 27 3 27 8 27 7 27 9   MCHC g/dL 32 1 32 8 32 7 32 6   RDW % 14 2 14 1 13 8 13 3   MPV fL 9 2 10 0 9 7 9 8   NRBC AUTO /100 WBCs 0 0 0  --          CMP:    Results from last 7 days  Lab Units 18  0524 18  0431 18  0544 18  0549 18  0514   SODIUM mmol/L 133* 134* 133* 135* 135*   POTASSIUM mmol/L 3 9 3 9 4 3 4 1 4 1   CHLORIDE mmol/L 98* 99* 100 101 100   CO2 mmol/L    ANION GAP mmol/L 8 9 4 8 9   BUN mg/dL 17 17 12 9 11   CREATININE mg/dL 0 99 0 99 0 89 0 88 0 90   GLUCOSE RANDOM mg/dL 143* 120 178* 172* 232*   CALCIUM mg/dL 8 4 8 8 8 9 8 3 8 4   EGFR ml/min/1 73sq m 84 84 94 95 94         BMP:    Results from last 7 days  Lab Units 18  0518  0431 18  0544 18  0549 18  0514   SODIUM mmol/L 133* 134* 133* 135* 135*   POTASSIUM mmol/L 3 9 3 9 4 3 4 1 4 1   CHLORIDE mmol/L 98* 99* 100 101 100   CO2 mmol/L    BUN mg/dL 17 17 12 9 11   CREATININE mg/dL 0 99 0 99 0 89 0 88 0 90   GLUCOSE RANDOM mg/dL 143* 120 178* 172* 232*   CALCIUM mg/dL 8 4 8 8 8 9 8 3 8 4     Magnesium:     Results from last 7 days  Lab Units 18  0524 18  0431 18  0544   MAGNESIUM mg/dL 1 9 2 0 2 1       Coags:     Results from last 7 days  Lab Units 18  0544   PTT seconds 29   INR  1 06       Cardiac testing:   Results for orders placed during the hospital encounter of 18   Echo complete with contrast if indicated    Narrative [address]  [address]  [place], [address]  [phone]    Transthoracic Echocardiogram  2D, M-mode, Doppler, and Color Doppler    Study date:  15-Mar-2018    Patient: Cande Nunes  MR number: [id number]  Account number: [de-identified]  : 1960  Age: 61 years  Gender: Male  Status: Inpatient  Location: Bedside  Height: 73 in  Weight: 258 lb  BP: 160/ 85 mmHg    Indications: Acute myocardial infarction      Diagnoses: I21 4 - Non-ST elevation (NSTEMI) myocardial infarction    Sonographer:  Jasmyne Parmar, [facility]  Primary Physician:  Leida Smith DO  Referring Physician:  Reyna Cerrato PA-C  Group:  [facility]  Interpreting Physician:  Misti Burch MD    SUMMARY    LEFT VENTRICLE:  Size was at the upper limits of normal   Systolic function was normal  Ejection fraction was estimated in the range of 55 % to 60 %  There was moderate hypokinesis of the basal inferior wall(s)  Wall thickness was at the upper limits of normal     MITRAL VALVE:  There was trace regurgitation  TRICUSPID VALVE:  There was trace regurgitation  PULMONIC VALVE:  There was trace regurgitation  HISTORY: PRIOR HISTORY: Hypertension, Diabetes, Peripheral vascular disease, Elevated troponin  PROCEDURE: The procedure was performed at the bedside  This was a routine study  The transthoracic approach was used  The study included complete 2D imaging, M-mode, complete spectral Doppler, and color Doppler  The heart rate was 73 bpm,  at the start of the study  Images were obtained from the parasternal, apical, subcostal, and suprasternal notch acoustic windows  Echocardiographic views were limited due to decreased penetration and lung interference  This was a  technically difficult study  LEFT VENTRICLE: Size was at the upper limits of normal  Systolic function was normal  Ejection fraction was estimated in the range of 55 % to 60 %  There was moderate hypokinesis of the basal inferior wall(s)  Wall thickness was at the  upper limits of normal  DOPPLER: There was an increased relative contribution of atrial contraction to ventricular filling  The deceleration time of the early transmitral flow velocity was normal     RIGHT VENTRICLE: The size was normal  Systolic function was normal  Wall thickness was normal     LEFT ATRIUM: Size was at the upper limits of normal     RIGHT ATRIUM: Size was normal     MITRAL VALVE: Valve structure was normal  There was normal leaflet separation   DOPPLER: The transmitral velocity was within the normal range  There was no evidence for stenosis  There was trace regurgitation  AORTIC VALVE: The valve was trileaflet  Leaflets exhibited normal thickness and normal cuspal separation  DOPPLER: Transaortic velocity was within the normal range  There was no evidence for stenosis  There was no regurgitation  TRICUSPID VALVE: The valve structure was normal  There was normal leaflet separation  DOPPLER: The transtricuspid velocity was within the normal range  There was no evidence for stenosis  There was trace regurgitation  Pulmonary artery  systolic pressure was within the normal range  Estimated peak PA pressure was 20 mmHg  PULMONIC VALVE: Leaflets exhibited normal thickness, no calcification, and normal cuspal separation  DOPPLER: The transpulmonic velocity was within the normal range  There was trace regurgitation  PERICARDIUM: The amount of pericardial fluid appeared to be at the upper limits of normal     AORTA: The root exhibited normal size  SYSTEMIC VEINS: IVC: The inferior vena cava was not well visualized  SYSTEM MEASUREMENT TABLES    2D  Ao Diam: 3 6 cm  IVSd: 1 cm  LA Diam: 4 cm  LVIDd: 5 3 cm  LVIDs: 3 9 cm  LVPWd: 0 9 cm    CW  TR Vmax: 1 9 m/s  TR maxP 7 mmHg    PW  E': 0 m/s  E/E': 15 2  MV A [place]: 0 7 m/s  MV Dec [place]: 5 4 m/s2  MV DecT: 127 7 ms  MV E [place]: 0 7 m/s  MV E/A Ratio: 1    [employer] Accredited Echocardiography Laboratory    Prepared and electronically signed by    Bro Negro MD  Signed 15-Mar-2018 13:11:20       Results for orders placed during the hospital encounter of 18   NM myocardial perfusion spect (stress and/or rest)    Narrative [address] [place] [address]    [place], [address]  [phone]    Rest/Stress Gated SPECT Myocardial Perfusion Imaging After Regadenoson    Patient: Capo Cui  MR number: [id number]  Account number: [de-identified]  : 1960  Age: 61 years  Gender: Male  Status: Inpatient  Location: Stress lab  Height: 73 in  Weight: 258 lb  BP: 153/ 76 mmHg    Allergies: METFORMIN    Diagnosis: R94 39 - Abnormal result of other cardiovascular function study, Z01 810 - Encounter for preprocedural cardiovascular examination    Primary Physician:  Lilly Costello DO  Technician:  Sandra Ordonez  RN:  Yina Gerard RN BSN  Referring Physician:  Hazel Youngblood MD  Group:  [facility]  Report Prepared By[de-identified]  Yina Gerard RN BSN  Interpreting Physician:  Hazel Youngblood MD    INDICATIONS: Detection of Coronary artery disease  Pre-operative risk assessment  HISTORY: The patient is a 60year old  male  Chest pain status: no chest pain  Other symptoms: decreased effort tolerance  Coronary artery disease risk factors: dyslipidemia and diabetes mellitus  Cardiovascular history:  peripheral vascular occlusive disease(PAD)  Co-morbidity: obesity  Medications: a lipid lowering agent  Previous test results: abnormal ECG and abnormal resting echocardiogram     PHYSICAL EXAM: Baseline physical exam screening: normal lung exam     REST ECG: Normal sinus rhythm  There was 1-2 mm ST depression in leads II, III and aVF  PROCEDURE: The study was performed in the stress lab  A regadenoson infusion pharmacologic stress test was performed  Gated SPECT myocardial perfusion imaging was performed after stress and at rest  Systolic blood pressure was 153 mmHg, at  the start of the study  Diastolic blood pressure was 76 mmHg, at the start of the study  The heart rate was 78 bpm, at the start of the study  IV double checked  Regadenoson protocol:  HR bpm SBP mmHg DBP mmHg Symptoms  Baseline 78 153 76 none  Immediate 86 105 64 moderate dyspnea  3 min 81 -- -- subsiding  5 min 82 115 64 none  No medications or fluids given  STRESS SUMMARY: Duration of pharmacologic stress was 3 min   Maximal heart rate during stress was 86 bpm  The rate-pressure product for the peak heart rate and blood pressure was 07008  There was no chest pain during stress  The stress test  was terminated due to protocol completion  Pre oxygen saturation: 97 %  Peak oxygen saturation: 98 %  Arrhythmia during stress: isolated atrial premature beats  The stress ECG was non-diagnostic due to resting ST/T wave abnormality  ISOTOPE ADMINISTRATION:  Resting isotope administration Stress isotope administration  Agent Tetrofosmin Tetrofosmin  Dose 15 2 mCi 48 8 mCi  Date 03/22/2018 03/22/2018  Injection time 10:34 11:40  Imaging time 11:08 12:26  Injection-image interval 34 min 46 min    The radiopharmaceutical was injected at the peak effect of pharmacologic stress  MYOCARDIAL PERFUSION IMAGING:  The image quality was good  The left ventricle was mildly dilated  The TID ratio was 1 28  PERFUSION DEFECTS:  -  There was a large, moderately severe, partially reversible myocardial perfusion defect of the entire inferolateral wall  GATED SPECT:  The calculated left ventricular ejection fraction was 45 %  Left ventricular ejection fraction was mildly decreased by visual estimate  There was moderately reduced myocardial thickening and motion of the inferior wall and lateral wall of  the left ventricle  SUMMARY:  -  Stress results: There was no chest pain during stress  -  ECG conclusions: The stress ECG was non-diagnostic due to resting ST/T wave abnormality   -  Perfusion imaging: There was a large, moderately severe, partially reversible myocardial perfusion defect of the entire inferolateral wall  -  Gated SPECT: The calculated left ventricular ejection fraction was 45 %  Left ventricular ejection fraction was mildly decreased by visual estimate  There was moderately reduced myocardial thickening and motion of the inferior wall and  lateral wall of the left ventricle  IMPRESSIONS: Abnormal study after pharmacologic stress      Prepared and signed by    Thuy Figueredo MD  Signed 03/22/2018 25:37:37         Meds/Allergies     Current Facility-Administered Medications:  acetaminophen 650 mg Oral Q6H PRN Chris Estevez MD    aspirin 325 mg Oral Daily Chris Estevez MD    atorvastatin 40 mg Oral Daily Chris Estevez MD    bisacodyl 10 mg Rectal Daily PRN Gabriela Gonzalez MD    calcium carbonate 500 mg Oral Daily PRN Chris Estevez MD    cefTRIAXone 1,000 mg Intravenous Q24H Chris Estevez MD Last Rate: 1,000 mg (03/26/18 1749)   chlorhexidine 15 mL Mouth/Throat Q12H [address] Anamika Stephens PA-C    collagenase  Topical Daily Chris Estevez MD    docusate sodium 100 mg Oral BID Chris Estevez MD    insulin glargine 65 Units Subcutaneous HS Steve Trevino MD    insulin lispro 1-6 Units Subcutaneous HS Steve Trevino MD    insulin lispro 1-6 Units Subcutaneous 0200 Steve Trevino MD    insulin lispro 2-12 Units Subcutaneous TID AC Steve Trevino MD    insulin lispro 24 Units Subcutaneous TID With Meals Steve Trevino MD    lactulose 20 g Oral 4x Daily Gabriela Gonzalez MD    lactulose 200 g Rectal BID PRN Chris Estevez MD    LORazepam 0 5 mg Oral BID PRN Chris Estevez MD    metoclopramide 5 mg Oral Daily Chris Estevez MD    metoprolol 2 5 mg Intravenous Q6H PRN Az Goldsmith PA-C    [START ON 3/28/2018] metoprolol tartrate 12 5 mg Oral Once Ijeoma Patel PA-C    metroNIDAZOLE 500 mg Intravenous Gilmar Hernández MD Last Rate: Stopped (03/27/18 0304)   morphine injection 2 mg Intravenous Q4H PRN Chris Esteevz MD    mupirocin 1 application Nasal [address] Denisse Espitia PA-C    ondansetron 4 mg Intravenous Q6H PRN Chris Estevez MD         Facility-Administered Medications Prior to Admission   Medication    silver sulfadiazine (SILVADENE,SSD) 1 % cream     Prescriptions Prior to Admission   Medication    atorvastatin (LIPITOR) 40 mg tablet    insulin aspart protamine-insulin aspart (NOVOLOG MIX 70/30 FLEXPEN) 100 Units/mL SUPN    LANTUS SOLOSTAR injection pen 100 units/mL    LORazepam (ATIVAN) 0 5 mg tablet    metoclopramide (REGLAN) 5 mg tablet    midodrine (PROAMATINE) 5 mg tablet    torsemide (DEMADEX) 10 mg tablet       Assessment:  Principal Problem:    Triple vessel coronary artery disease  Active Problems:    Anxiety and depression    Benign essential HTN    Diabetic autonomic neuropathy associated with type 2 diabetes mellitus (HCC)    PAD (peripheral artery disease) ([facility])    Non-healing wound of left heel    Class 2 obesity due to excess calories with serious comorbidity and body mass index (BMI) of 35 0 to 35 9 in adult    Acute on chronic diastolic heart failure (HCC)    Other constipation      Impression:  1  Type II NSTEMI - multivessel CAD - plan for CABG on 3/28  2  Acute on chronic diastolic heart failure - diuresing on bumex  3  PAD - revascularization after CABG  4  Autonomic neuropathy      Plan:  1  Continue diuretics  2  Await surgery

## 2018-03-27 NOTE — PHYSICAL THERAPY NOTE
Physical Therapy Cancellation Note    Upon approach, pt pleasant but stating he doesn't "feel right"  He reports nausea, and requests basin  Pt educated to let nursing staff know if this does not resolve  Pt reports his LLE feels sore and due to surgery scheduled for tomorrow, pt requests to defer PT  Will continue to follow per PT POC       Lyle Lopez, PTA

## 2018-03-27 NOTE — PROGRESS NOTES
Progress Note - Ramin Estrella 1960, 61 y o  male MRN: [id number]    Unit/Bed#: [facility] 412-01 Encounter: [id number]    Primary Care Provider: Lynette Rock DO   Date and time admitted to hospital: 3/23/2018  5:21 PM        * Triple vessel coronary artery disease   Assessment & Plan    · Patient was admitted in [facility] for chest pain and nonhealing wound of the left lower extremity  · Underwent cardiac catheterization, found to have triple-vessel disease  · Continue with current medical management including aspirin, statin  · For CABG on Wednesday with CT surgery team        Acute on chronic diastolic heart failure ([facility] )   Assessment & Plan    · With unclear systolic component, patient ejection fraction 45% on recent stress test  · Patient with increased lower extremity edema, trace edema at the sacral area  · Patient remains positive several L despite being on Lasix 80 mg IV t i d , changed to Bumex 2 mg IV t i d    · Strict I&O  · Daily weight  · Appreciated cardiology recommendation, no change in therapy today  · Daily BMP while on IV diuretics        Other constipation   Assessment & Plan    · Patient had a small bowel movement yesterday, continue with current regimen and monitor        Class 2 obesity due to excess calories with serious comorbidity and body mass index (BMI) of 35 0 to 35 9 in adult   Assessment & Plan    · Discuss with primary care physician options for weight loss        Non-healing wound of left heel   Assessment & Plan    · Patient evaluated by Podiatry, patient evaluated by vascular surgery  · Status post failed arteriogram with attempted to revascularization on the left, status post debridement of the left heel by Podiatry  · Continue with Rocephin and Flagyl for a total of 7 days, day 4/7  · Previous images without osteomyelitis  · Local care as per Podiatry  · Vascular care as per PAD        PAD (peripheral artery disease) ([facility] )   Assessment & Plan    · Patient evaluated by vascular surgery, unsuccessful angiographic left popliteal recanalization with IR on   · Patient will be scheduled for femoropopliteal bypass when he recovered from CABG, possibly within a month from cardiac surgery  · Vascular surgery team has signed off        Diabetic autonomic neuropathy associated with type 2 diabetes mellitus ([address] )   Assessment & Plan    · A1c few days ago 9 4, diabetes uncontrolled with multiple complications, vascular and neurological  · Appreciated endocrinologist input, insulin adjustment as per consultant  · Monitor closely        Benign essential HTN   Assessment & Plan    · Continue with metoprolol IV p r n , so far patient has not required any inpatient agent on a scheduled fashion  · Monitor         Anxiety and depression   Assessment & Plan    · Continue with p r n  Ativan  · mood appears stable            VTE Pharmacologic Prophylaxis:   Pharmacologic:  Lovenox hold for tentative surgery  Mechanical VTE Prophylaxis in Place: No    Patient Centered Rounds: I have performed bedside rounds with nursing staff today  Time Spent for Care: 15 minutes  More than 50% of total time spent on counseling and coordination of care as described above  Current Length of Stay: 4 day(s)    Current Patient Status: Inpatient   Certification Statement: The patient will continue to require additional inpatient hospital stay due to Need to monitor symptoms    Discharge Plan:  Surgery tomorrow    Code Status: Level 1 - Full Code      Subjective:   Patient comfortable  Complains of constipation  Objective:     Vitals:   Temp (24hrs), Av 8 °F (36 6 °C), Min:97 1 °F (36 2 °C), Max:98 2 °F (36 8 °C)    HR:  [76-82] 81  Resp:  [18] 18  BP: (100-123)/(58-73) 122/59  SpO2:  [92 %-99 %] 96 %  Body mass index is 34 67 kg/m²  Input and Output Summary (last 24 hours):        Intake/Output Summary (Last 24 hours) at 18 1792  Last data filed at 18 2659   Gross per 24 hour   Intake 1320 ml   Output              775 ml   Net              545 ml       Physical Exam:     Physical Exam   Constitutional: He is oriented to person, place, and time  He appears well-developed and well-nourished  HENT:   Head: Normocephalic and atraumatic  Eyes: Conjunctivae are normal  Pupils are equal, round, and reactive to light  Neck: Normal range of motion  Neck supple  No tracheal deviation present  No thyromegaly present  Cardiovascular: Normal rate and regular rhythm  Pulmonary/Chest: Effort normal and breath sounds normal  No respiratory distress  Abdominal: Soft  Bowel sounds are normal  He exhibits no distension  There is no tenderness  Musculoskeletal: Normal range of motion  He exhibits no edema  Neurological: He is alert and oriented to person, place, and time  Skin: Skin is warm and dry  No rash noted  No erythema  Psychiatric: He has a normal mood and affect  Additional Data:     Labs:      Results from last 7 days  Lab Units 03/27/18  0524   WBC Thousand/uL 10 96*   HEMOGLOBIN g/dL 11 1*   HEMATOCRIT % 34 6*   PLATELETS Thousands/uL 273   NEUTROS PCT % 67   LYMPHS PCT % 22   MONOS PCT % 7   EOS PCT % 3       Results from last 7 days  Lab Units 03/27/18  0524   SODIUM mmol/L 133*   POTASSIUM mmol/L 3 9   CHLORIDE mmol/L 98*   CO2 mmol/L 27   BUN mg/dL 17   CREATININE mg/dL 0 99   CALCIUM mg/dL 8 4   GLUCOSE RANDOM mg/dL 143*       Results from last 7 days  Lab Units 03/25/18  0544   INR  1 06       * I Have Reviewed All Lab Data Listed Above  * Additional Pertinent Lab Tests Reviewed:  All Labs Within Last 24 Hours Reviewed        Recent Cultures (last 7 days):           Last 24 Hours Medication List:     Current Facility-Administered Medications:  acetaminophen 650 mg Oral Q6H PRN Chris Estevez MD    aspirin 325 mg Oral Daily Chris Estevez MD    atorvastatin 40 mg Oral Daily Chris Estevez MD    bisacodyl 10 mg Rectal Daily PRN Gabriela Gonzalez MD    calcium carbonate 500 mg Oral Daily PRN Carlie Finley MD    cefTRIAXone 1,000 mg Intravenous Q24H Carlie Finley MD Last Rate: 1,000 mg (03/26/18 1749)   chlorhexidine 15 mL Mouth/Throat Q12H [address] Anamika Kasie Stephens PA-C    collagenase  Topical Daily Carlie Finley MD    docusate sodium 100 mg Oral BID Carlie Finley MD    insulin glargine 65 Units Subcutaneous HS Neto Trinidad MD    insulin lispro 1-6 Units Subcutaneous HS Neto Trinidad MD    insulin lispro 1-6 Units Subcutaneous 0200 Neto Trinidad MD    insulin lispro 2-12 Units Subcutaneous TID AC eNto Trinidad MD    insulin lispro 24 Units Subcutaneous TID With Meals Neto Trinidad MD    lactulose 20 g Oral 4x Daily Minerva Perez MD    lactulose 200 g Rectal BID PRN Carlie Finley MD    LORazepam 0 5 mg Oral BID PRN Carlie Finley MD    metoclopramide 5 mg Oral Daily Carlie Finley MD    metoprolol 2 5 mg Intravenous Q6H PRN Siva Jesus PA-C    [START ON 3/28/2018] metoprolol tartrate 12 5 mg Oral Once Doni Cota PA-C    metroNIDAZOLE 500 mg Intravenous Royal Harish MD Last Rate: Stopped (03/27/18 0304)   morphine injection 2 mg Intravenous Q4H PRN Carlie Finley MD    mupirocin 1 application Nasal [id number] [address], [place]    ondansetron 4 mg Intravenous Q6H PRN Carlie Finley MD         Today, Patient Was Seen By: Randa Chris DO    ** Please Note: Dictation voice to text software may have been used in the creation of this document   **

## 2018-03-27 NOTE — PROGRESS NOTES
Progress Note - Feliberto Marie 61 y o  male MRN: [id number]    [facility] Encounter: [id number]      CC: diabetes f/u    Subjective:   Feliberto Marie is a 61y o  year old male with type 2 diabetes  Feels well  No complaints  No hypoglycemia  Objective:     Vitals: Blood pressure 111/56, pulse 83, temperature 98 1 °F (36 7 °C), temperature source Oral, resp  rate 18, height 6' 1" (1 854 m), weight 119 kg (262 lb 12 6 oz), SpO2 98 %  ,Body mass index is 34 67 kg/m²  Intake/Output Summary (Last 24 hours) at 03/27/18 1430  Last data filed at 03/27/18 1159   Gross per 24 hour   Intake             1798 ml   Output              775 ml   Net             1023 ml       Physical Exam:  General: No acute distress  Alert, awake, and oriented x3  HEENT: Normocephalic, atraumatic  Heart: Regular rate and rhythm  Lungs: Clear  No respiratory distress  Skin: Warm, dry  No rash  Neuro: Moves all 4 extremities spontaneously  Psych: Appropriate mood and affect  Cooperative  Lab, Imaging and other studies: I have personally reviewed pertinent reports  POC Glucose   Date Value   03/27/2018 105 mg/dl   03/27/2018 169 mg/dl (H)   03/27/2018 163 mg/dl (H)   03/26/2018 114 mg/dl   03/26/2018 108 mg/dl   03/26/2018 122 mg/dl   03/26/2018 142 mg/dl (H)   03/26/2018 126 mg/dl   03/26/2018 96 mg/dl   03/25/2018 97 mg/dl   01/11/2018 130   09/07/2016 84       Assessment:  Type 2 diabetes with hyperglycemia and autonomic neuropathy  CAD  Nonhealing wound of left heel with peripheral artery disease      Plan:  Type 2 diabetes with hyperglycemia on a neuropathy:  Currently on subcu insulin and blood sugars stable  To go for CABG tomorrow  Will be NPO after midnight  Will transition to insulin infusion around 10:00 p m  tonight and discontinue subcutaneous insulin  Will continue to follow and eventually transition off of the insulin drip postoperatively when appropriate    Monitor for hypoglycemia and treated per protocol  CAD:  For CABG tomorrow  Nonhealing wound of left heel with peripheral artery disease: Will have vascular surgery follow-up after CABG surgery

## 2018-03-27 NOTE — ASSESSMENT & PLAN NOTE
· Patient was admitted in [facility] for chest pain and nonhealing wound of the left lower extremity  · Underwent cardiac catheterization, found to have triple-vessel disease  · Continue with current medical management including aspirin, statin  · For CABG on Wednesday with CT surgery team

## 2018-03-28 ENCOUNTER — APPOINTMENT (INPATIENT)
Dept: NON INVASIVE DIAGNOSTICS | Facility: HOSPITAL | Age: 58
DRG: 235 | End: 2018-03-28
Attending: THORACIC SURGERY (CARDIOTHORACIC VASCULAR SURGERY)
Payer: COMMERCIAL

## 2018-03-28 ENCOUNTER — ANESTHESIA (INPATIENT)
Dept: PERIOP | Facility: HOSPITAL | Age: 58
DRG: 235 | End: 2018-03-28
Payer: COMMERCIAL

## 2018-03-28 ENCOUNTER — APPOINTMENT (INPATIENT)
Dept: RADIOLOGY | Facility: HOSPITAL | Age: 58
DRG: 235 | End: 2018-03-28
Payer: COMMERCIAL

## 2018-03-28 PROBLEM — J95.89 ACUTE RESPIRATORY INSUFFICIENCY, POSTOPERATIVE: Status: ACTIVE | Noted: 2018-03-28

## 2018-03-28 PROBLEM — I21.A1 NON-ST ELEVATION MYOCARDIAL INFARCTION (NSTEMI), TYPE 2: Status: RESOLVED | Noted: 2018-03-25 | Resolved: 2018-03-28

## 2018-03-28 PROBLEM — R94.31 T WAVE INVERSION IN EKG: Status: RESOLVED | Noted: 2018-03-14 | Resolved: 2018-03-28

## 2018-03-28 PROBLEM — Z95.1 S/P CABG X 3: Status: ACTIVE | Noted: 2018-03-28

## 2018-03-28 LAB
ANION GAP SERPL CALCULATED.3IONS-SCNC: 10 MMOL/L (ref 4–13)
ATRIAL RATE: 78 BPM
BASE EXCESS BLDA CALC-SCNC: 1 MMOL/L (ref -2–3)
BASE EXCESS BLDA CALC-SCNC: 1 MMOL/L (ref -2–3)
BASE EXCESS BLDA CALC-SCNC: 3 MMOL/L (ref -2–3)
BASE EXCESS BLDA CALC-SCNC: 3 MMOL/L (ref -2–3)
BASE EXCESS BLDA CALC-SCNC: 5 MMOL/L (ref -2–3)
BASE EXCESS BLDA CALC-SCNC: 7 MMOL/L (ref -2–3)
BUN SERPL-MCNC: 13 MG/DL (ref 5–25)
CA-I BLD-SCNC: 0.94 MMOL/L (ref 1.12–1.32)
CA-I BLD-SCNC: 1.04 MMOL/L (ref 1.12–1.32)
CA-I BLD-SCNC: 1.05 MMOL/L (ref 1.12–1.32)
CA-I BLD-SCNC: 1.06 MMOL/L (ref 1.12–1.32)
CA-I BLD-SCNC: 1.07 MMOL/L (ref 1.12–1.32)
CA-I BLD-SCNC: 1.12 MMOL/L (ref 1.12–1.32)
CA-I BLD-SCNC: 1.17 MMOL/L (ref 1.12–1.32)
CA-I BLD-SCNC: 1.29 MMOL/L (ref 1.12–1.32)
CALCIUM SERPL-MCNC: 8.1 MG/DL (ref 8.3–10.1)
CHLORIDE SERPL-SCNC: 104 MMOL/L (ref 100–108)
CO2 SERPL-SCNC: 24 MMOL/L (ref 21–32)
CREAT SERPL-MCNC: 0.85 MG/DL (ref 0.6–1.3)
GFR SERPL CREATININE-BSD FRML MDRD: 96 ML/MIN/1.73SQ M
GLUCOSE SERPL-MCNC: 104 MG/DL (ref 65–140)
GLUCOSE SERPL-MCNC: 104 MG/DL (ref 65–140)
GLUCOSE SERPL-MCNC: 114 MG/DL (ref 65–140)
GLUCOSE SERPL-MCNC: 117 MG/DL (ref 65–140)
GLUCOSE SERPL-MCNC: 118 MG/DL (ref 65–140)
GLUCOSE SERPL-MCNC: 124 MG/DL (ref 65–140)
GLUCOSE SERPL-MCNC: 127 MG/DL (ref 65–140)
GLUCOSE SERPL-MCNC: 128 MG/DL (ref 65–140)
GLUCOSE SERPL-MCNC: 132 MG/DL (ref 65–140)
GLUCOSE SERPL-MCNC: 138 MG/DL (ref 65–140)
GLUCOSE SERPL-MCNC: 139 MG/DL (ref 65–140)
GLUCOSE SERPL-MCNC: 144 MG/DL (ref 65–140)
GLUCOSE SERPL-MCNC: 145 MG/DL (ref 65–140)
GLUCOSE SERPL-MCNC: 145 MG/DL (ref 65–140)
GLUCOSE SERPL-MCNC: 151 MG/DL (ref 65–140)
GLUCOSE SERPL-MCNC: 152 MG/DL (ref 65–140)
GLUCOSE SERPL-MCNC: 165 MG/DL (ref 65–140)
GLUCOSE SERPL-MCNC: 167 MG/DL (ref 65–140)
GLUCOSE SERPL-MCNC: 168 MG/DL (ref 65–140)
HCO3 BLDA-SCNC: 27.2 MMOL/L (ref 24–30)
HCO3 BLDA-SCNC: 27.8 MMOL/L (ref 24–30)
HCO3 BLDA-SCNC: 28.2 MMOL/L (ref 22–28)
HCO3 BLDA-SCNC: 28.2 MMOL/L (ref 22–28)
HCO3 BLDA-SCNC: 28.4 MMOL/L (ref 22–28)
HCO3 BLDA-SCNC: 28.9 MMOL/L (ref 22–28)
HCO3 BLDA-SCNC: 30.3 MMOL/L (ref 22–28)
HCO3 BLDA-SCNC: 32.1 MMOL/L (ref 22–28)
HCT VFR BLD AUTO: 30.5 % (ref 36.5–49.3)
HCT VFR BLD AUTO: 32.8 % (ref 36.5–49.3)
HCT VFR BLD CALC: 23 % (ref 36.5–49.3)
HCT VFR BLD CALC: 26 % (ref 36.5–49.3)
HCT VFR BLD CALC: 27 % (ref 36.5–49.3)
HCT VFR BLD CALC: 30 % (ref 36.5–49.3)
HCT VFR BLD CALC: 31 % (ref 36.5–49.3)
HGB BLD-MCNC: 10 G/DL (ref 12–17)
HGB BLD-MCNC: 10.3 G/DL (ref 12–17)
HGB BLDA-MCNC: 10.2 G/DL (ref 12–17)
HGB BLDA-MCNC: 10.5 G/DL (ref 12–17)
HGB BLDA-MCNC: 7.8 G/DL (ref 12–17)
HGB BLDA-MCNC: 8.8 G/DL (ref 12–17)
HGB BLDA-MCNC: 9.2 G/DL (ref 12–17)
KCT BLD-ACNC: 127 SEC (ref 89–137)
KCT BLD-ACNC: 138 SEC (ref 89–137)
KCT BLD-ACNC: 443 SEC (ref 89–137)
KCT BLD-ACNC: 443 SEC (ref 89–137)
KCT BLD-ACNC: 565 SEC (ref 89–137)
KCT BLD-ACNC: 616 SEC (ref 89–137)
P AXIS: 68 DEGREES
PCO2 BLD: 29 MMOL/L (ref 21–32)
PCO2 BLD: 29 MMOL/L (ref 21–32)
PCO2 BLD: 30 MMOL/L (ref 21–32)
PCO2 BLD: 32 MMOL/L (ref 21–32)
PCO2 BLD: 33.8 MM HG (ref 36–44)
PCO2 BLD: 34 MMOL/L (ref 21–32)
PCO2 BLD: 38.1 MM HG (ref 36–44)
PCO2 BLD: 45.3 MM HG (ref 36–44)
PCO2 BLD: 48.7 MM HG (ref 36–44)
PCO2 BLD: 49.8 MM HG (ref 36–44)
PCO2 BLD: 50.6 MM HG (ref 36–44)
PCO2 BLD: 51.7 MM HG (ref 42–50)
PCO2 BLD: 56.6 MM HG (ref 42–50)
PH BLD: 7.3 [PH] (ref 7.3–7.4)
PH BLD: 7.33 [PH] (ref 7.3–7.4)
PH BLD: 7.37 [PH] (ref 7.35–7.45)
PH BLD: 7.39 [PH] (ref 7.35–7.45)
PH BLD: 7.4 [PH] (ref 7.35–7.45)
PH BLD: 7.43 [PH] (ref 7.35–7.45)
PH BLD: 7.48 [PH] (ref 7.35–7.45)
PH BLD: 7.53 [PH] (ref 7.35–7.45)
PLATELET # BLD AUTO: 214 THOUSANDS/UL (ref 149–390)
PLATELET # BLD AUTO: 273 THOUSANDS/UL (ref 149–390)
PMV BLD AUTO: 9.1 FL (ref 8.9–12.7)
PMV BLD AUTO: 9.2 FL (ref 8.9–12.7)
PO2 BLD: 163 MM HG (ref 35–45)
PO2 BLD: 342 MM HG (ref 75–129)
PO2 BLD: 419 MM HG (ref 75–129)
PO2 BLD: 45 MM HG (ref 35–45)
PO2 BLD: 479 MM HG (ref 75–129)
PO2 BLD: 491 MM HG (ref 75–129)
PO2 BLD: 508 MM HG (ref 75–129)
PO2 BLD: 584 MM HG (ref 75–129)
POTASSIUM BLD-SCNC: 3.9 MMOL/L (ref 3.5–5.3)
POTASSIUM BLD-SCNC: 4.2 MMOL/L (ref 3.5–5.3)
POTASSIUM BLD-SCNC: 4.5 MMOL/L (ref 3.5–5.3)
POTASSIUM BLD-SCNC: 4.7 MMOL/L (ref 3.5–5.3)
POTASSIUM BLD-SCNC: 5 MMOL/L (ref 3.5–5.3)
POTASSIUM BLD-SCNC: 5.2 MMOL/L (ref 3.5–5.3)
POTASSIUM SERPL-SCNC: 3.4 MMOL/L (ref 3.5–5.3)
POTASSIUM SERPL-SCNC: 5.2 MMOL/L (ref 3.5–5.3)
POTASSIUM SERPL-SCNC: 5.2 MMOL/L (ref 3.5–5.3)
PR INTERVAL: 146 MS
QRS AXIS: -45 DEGREES
QRSD INTERVAL: 100 MS
QT INTERVAL: 425 MS
QTC INTERVAL: 485 MS
SAO2 % BLD FROM PO2: 100 % (ref 95–98)
SAO2 % BLD FROM PO2: 75 % (ref 95–98)
SAO2 % BLD FROM PO2: 99 % (ref 95–98)
SODIUM BLD-SCNC: 133 MMOL/L (ref 136–145)
SODIUM BLD-SCNC: 135 MMOL/L (ref 136–145)
SODIUM BLD-SCNC: 136 MMOL/L (ref 136–145)
SODIUM BLD-SCNC: 137 MMOL/L (ref 136–145)
SODIUM BLD-SCNC: 138 MMOL/L (ref 136–145)
SODIUM SERPL-SCNC: 138 MMOL/L (ref 136–145)
SPECIMEN SOURCE: ABNORMAL
SPECIMEN SOURCE: NORMAL
T WAVE AXIS: 96 DEGREES
VENTRICULAR RATE: 78 BPM

## 2018-03-28 PROCEDURE — 85049 AUTOMATED PLATELET COUNT: CPT | Performed by: THORACIC SURGERY (CARDIOTHORACIC VASCULAR SURGERY)

## 2018-03-28 PROCEDURE — 02HV33Z INSERTION OF INFUSION DEVICE INTO SUPERIOR VENA CAVA, PERCUTANEOUS APPROACH: ICD-10-PCS | Performed by: THORACIC SURGERY (CARDIOTHORACIC VASCULAR SURGERY)

## 2018-03-28 PROCEDURE — 82330 ASSAY OF CALCIUM: CPT

## 2018-03-28 PROCEDURE — 85049 AUTOMATED PLATELET COUNT: CPT | Performed by: PHYSICIAN ASSISTANT

## 2018-03-28 PROCEDURE — 33533 CABG ARTERIAL SINGLE: CPT | Performed by: THORACIC SURGERY (CARDIOTHORACIC VASCULAR SURGERY)

## 2018-03-28 PROCEDURE — 5A1221Z PERFORMANCE OF CARDIAC OUTPUT, CONTINUOUS: ICD-10-PCS | Performed by: THORACIC SURGERY (CARDIOTHORACIC VASCULAR SURGERY)

## 2018-03-28 PROCEDURE — 33518 CABG ARTERY-VEIN TWO: CPT | Performed by: THORACIC SURGERY (CARDIOTHORACIC VASCULAR SURGERY)

## 2018-03-28 PROCEDURE — 82947 ASSAY GLUCOSE BLOOD QUANT: CPT

## 2018-03-28 PROCEDURE — 85347 COAGULATION TIME ACTIVATED: CPT

## 2018-03-28 PROCEDURE — 06BP4ZZ EXCISION OF RIGHT SAPHENOUS VEIN, PERCUTANEOUS ENDOSCOPIC APPROACH: ICD-10-PCS | Performed by: THORACIC SURGERY (CARDIOTHORACIC VASCULAR SURGERY)

## 2018-03-28 PROCEDURE — 82948 REAGENT STRIP/BLOOD GLUCOSE: CPT

## 2018-03-28 PROCEDURE — P9021 RED BLOOD CELLS UNIT: HCPCS

## 2018-03-28 PROCEDURE — 82803 BLOOD GASES ANY COMBINATION: CPT

## 2018-03-28 PROCEDURE — 94002 VENT MGMT INPAT INIT DAY: CPT | Performed by: SOCIAL WORKER

## 2018-03-28 PROCEDURE — 84295 ASSAY OF SERUM SODIUM: CPT

## 2018-03-28 PROCEDURE — 84132 ASSAY OF SERUM POTASSIUM: CPT | Performed by: PHYSICIAN ASSISTANT

## 2018-03-28 PROCEDURE — 33533 CABG ARTERIAL SINGLE: CPT | Performed by: PHYSICIAN ASSISTANT

## 2018-03-28 PROCEDURE — 021109W BYPASS CORONARY ARTERY, TWO ARTERIES FROM AORTA WITH AUTOLOGOUS VENOUS TISSUE, OPEN APPROACH: ICD-10-PCS | Performed by: THORACIC SURGERY (CARDIOTHORACIC VASCULAR SURGERY)

## 2018-03-28 PROCEDURE — 85018 HEMOGLOBIN: CPT | Performed by: PHYSICIAN ASSISTANT

## 2018-03-28 PROCEDURE — 94760 N-INVAS EAR/PLS OXIMETRY 1: CPT | Performed by: SOCIAL WORKER

## 2018-03-28 PROCEDURE — 5A1223Z PERFORMANCE OF CARDIAC PACING, CONTINUOUS: ICD-10-PCS | Performed by: THORACIC SURGERY (CARDIOTHORACIC VASCULAR SURGERY)

## 2018-03-28 PROCEDURE — 93005 ELECTROCARDIOGRAM TRACING: CPT | Performed by: PHYSICIAN ASSISTANT

## 2018-03-28 PROCEDURE — 85014 HEMATOCRIT: CPT

## 2018-03-28 PROCEDURE — 80048 BASIC METABOLIC PNL TOTAL CA: CPT | Performed by: PHYSICIAN ASSISTANT

## 2018-03-28 PROCEDURE — 99223 1ST HOSP IP/OBS HIGH 75: CPT | Performed by: ANESTHESIOLOGY

## 2018-03-28 PROCEDURE — 30233N0 TRANSFUSION OF AUTOLOGOUS RED BLOOD CELLS INTO PERIPHERAL VEIN, PERCUTANEOUS APPROACH: ICD-10-PCS | Performed by: THORACIC SURGERY (CARDIOTHORACIC VASCULAR SURGERY)

## 2018-03-28 PROCEDURE — 71045 X-RAY EXAM CHEST 1 VIEW: CPT

## 2018-03-28 PROCEDURE — 93312 ECHO TRANSESOPHAGEAL: CPT

## 2018-03-28 PROCEDURE — 33518 CABG ARTERY-VEIN TWO: CPT | Performed by: PHYSICIAN ASSISTANT

## 2018-03-28 PROCEDURE — 33508 ENDOSCOPIC VEIN HARVEST: CPT | Performed by: THORACIC SURGERY (CARDIOTHORACIC VASCULAR SURGERY)

## 2018-03-28 PROCEDURE — 84132 ASSAY OF SERUM POTASSIUM: CPT

## 2018-03-28 PROCEDURE — 85014 HEMATOCRIT: CPT | Performed by: PHYSICIAN ASSISTANT

## 2018-03-28 PROCEDURE — 02100Z9 BYPASS CORONARY ARTERY, ONE ARTERY FROM LEFT INTERNAL MAMMARY, OPEN APPROACH: ICD-10-PCS | Performed by: THORACIC SURGERY (CARDIOTHORACIC VASCULAR SURGERY)

## 2018-03-28 PROCEDURE — 33508 ENDOSCOPIC VEIN HARVEST: CPT | Performed by: PHYSICIAN ASSISTANT

## 2018-03-28 DEVICE — MARKER CORONARY BYPASS VOSS GRAFT: Type: IMPLANTABLE DEVICE | Site: AORTA | Status: FUNCTIONAL

## 2018-03-28 RX ORDER — METOCLOPRAMIDE HYDROCHLORIDE 5 MG/ML
INJECTION INTRAMUSCULAR; INTRAVENOUS AS NEEDED
Status: DISCONTINUED | OUTPATIENT
Start: 2018-03-28 | End: 2018-03-28 | Stop reason: SURG

## 2018-03-28 RX ORDER — ACETAMINOPHEN 650 MG/1
650 SUPPOSITORY RECTAL EVERY 4 HOURS PRN
Status: DISCONTINUED | OUTPATIENT
Start: 2018-03-28 | End: 2018-03-29

## 2018-03-28 RX ORDER — ROCURONIUM BROMIDE 10 MG/ML
INJECTION, SOLUTION INTRAVENOUS AS NEEDED
Status: DISCONTINUED | OUTPATIENT
Start: 2018-03-28 | End: 2018-03-28 | Stop reason: SURG

## 2018-03-28 RX ORDER — GLYCOPYRROLATE 0.2 MG/ML
INJECTION INTRAMUSCULAR; INTRAVENOUS AS NEEDED
Status: DISCONTINUED | OUTPATIENT
Start: 2018-03-28 | End: 2018-03-28 | Stop reason: SURG

## 2018-03-28 RX ORDER — HEPARIN SODIUM 1000 [USP'U]/ML
INJECTION, SOLUTION INTRAVENOUS; SUBCUTANEOUS AS NEEDED
Status: DISCONTINUED | OUTPATIENT
Start: 2018-03-28 | End: 2018-03-28 | Stop reason: SURG

## 2018-03-28 RX ORDER — BISACODYL 10 MG
10 SUPPOSITORY, RECTAL RECTAL DAILY PRN
Status: DISCONTINUED | OUTPATIENT
Start: 2018-03-28 | End: 2018-04-04 | Stop reason: HOSPADM

## 2018-03-28 RX ORDER — ASPIRIN 325 MG
325 TABLET ORAL DAILY
Status: DISCONTINUED | OUTPATIENT
Start: 2018-03-28 | End: 2018-04-04 | Stop reason: HOSPADM

## 2018-03-28 RX ORDER — PROPOFOL 10 MG/ML
INJECTION, EMULSION INTRAVENOUS CONTINUOUS PRN
Status: DISCONTINUED | OUTPATIENT
Start: 2018-03-28 | End: 2018-03-28 | Stop reason: SURG

## 2018-03-28 RX ORDER — PROPOFOL 10 MG/ML
INJECTION, EMULSION INTRAVENOUS AS NEEDED
Status: DISCONTINUED | OUTPATIENT
Start: 2018-03-28 | End: 2018-03-28 | Stop reason: SURG

## 2018-03-28 RX ORDER — LIDOCAINE HYDROCHLORIDE 10 MG/ML
INJECTION, SOLUTION INFILTRATION; PERINEURAL AS NEEDED
Status: DISCONTINUED | OUTPATIENT
Start: 2018-03-28 | End: 2018-03-28 | Stop reason: SURG

## 2018-03-28 RX ORDER — FUROSEMIDE 10 MG/ML
40 INJECTION INTRAMUSCULAR; INTRAVENOUS EVERY 6 HOURS PRN
Status: DISCONTINUED | OUTPATIENT
Start: 2018-03-28 | End: 2018-03-29

## 2018-03-28 RX ORDER — OXYCODONE HYDROCHLORIDE AND ACETAMINOPHEN 5; 325 MG/1; MG/1
1 TABLET ORAL EVERY 4 HOURS PRN
Status: DISCONTINUED | OUTPATIENT
Start: 2018-03-28 | End: 2018-04-04 | Stop reason: HOSPADM

## 2018-03-28 RX ORDER — SODIUM CHLORIDE 9 MG/ML
INJECTION, SOLUTION INTRAVENOUS CONTINUOUS PRN
Status: DISCONTINUED | OUTPATIENT
Start: 2018-03-28 | End: 2018-03-28 | Stop reason: SURG

## 2018-03-28 RX ORDER — PANTOPRAZOLE SODIUM 40 MG/1
40 TABLET, DELAYED RELEASE ORAL
Status: DISCONTINUED | OUTPATIENT
Start: 2018-03-29 | End: 2018-04-04 | Stop reason: HOSPADM

## 2018-03-28 RX ORDER — CALCIUM CHLORIDE 100 MG/ML
1 INJECTION INTRAVENOUS; INTRAVENTRICULAR ONCE
Status: DISCONTINUED | OUTPATIENT
Start: 2018-03-28 | End: 2018-03-29

## 2018-03-28 RX ORDER — ATORVASTATIN CALCIUM 80 MG/1
80 TABLET, FILM COATED ORAL
Status: DISCONTINUED | OUTPATIENT
Start: 2018-03-28 | End: 2018-04-04 | Stop reason: HOSPADM

## 2018-03-28 RX ORDER — POTASSIUM CHLORIDE 14.9 MG/ML
20 INJECTION INTRAVENOUS
Status: DISCONTINUED | OUTPATIENT
Start: 2018-03-28 | End: 2018-03-29

## 2018-03-28 RX ORDER — ACETAMINOPHEN 325 MG/1
650 TABLET ORAL EVERY 4 HOURS PRN
Status: DISCONTINUED | OUTPATIENT
Start: 2018-03-28 | End: 2018-04-04 | Stop reason: HOSPADM

## 2018-03-28 RX ORDER — FENTANYL CITRATE 50 UG/ML
INJECTION, SOLUTION INTRAMUSCULAR; INTRAVENOUS AS NEEDED
Status: DISCONTINUED | OUTPATIENT
Start: 2018-03-28 | End: 2018-03-28 | Stop reason: SURG

## 2018-03-28 RX ORDER — ONDANSETRON 2 MG/ML
4 INJECTION INTRAMUSCULAR; INTRAVENOUS EVERY 6 HOURS PRN
Status: DISCONTINUED | OUTPATIENT
Start: 2018-03-28 | End: 2018-04-04 | Stop reason: HOSPADM

## 2018-03-28 RX ORDER — POLYETHYLENE GLYCOL 3350 17 G/17G
17 POWDER, FOR SOLUTION ORAL DAILY
Status: DISCONTINUED | OUTPATIENT
Start: 2018-03-29 | End: 2018-04-04 | Stop reason: HOSPADM

## 2018-03-28 RX ORDER — FONDAPARINUX SODIUM 2.5 MG/.5ML
2.5 INJECTION SUBCUTANEOUS DAILY
Status: DISCONTINUED | OUTPATIENT
Start: 2018-03-29 | End: 2018-04-04 | Stop reason: HOSPADM

## 2018-03-28 RX ORDER — ONDANSETRON 2 MG/ML
INJECTION INTRAMUSCULAR; INTRAVENOUS AS NEEDED
Status: DISCONTINUED | OUTPATIENT
Start: 2018-03-28 | End: 2018-03-28 | Stop reason: SURG

## 2018-03-28 RX ORDER — MAGNESIUM SULFATE HEPTAHYDRATE 40 MG/ML
2 INJECTION, SOLUTION INTRAVENOUS ONCE
Status: COMPLETED | OUTPATIENT
Start: 2018-03-28 | End: 2018-03-28

## 2018-03-28 RX ORDER — OXYCODONE HYDROCHLORIDE AND ACETAMINOPHEN 5; 325 MG/1; MG/1
2 TABLET ORAL EVERY 6 HOURS PRN
Status: DISCONTINUED | OUTPATIENT
Start: 2018-03-28 | End: 2018-04-04 | Stop reason: HOSPADM

## 2018-03-28 RX ORDER — SODIUM CHLORIDE 450 MG/100ML
20 INJECTION, SOLUTION INTRAVENOUS CONTINUOUS
Status: DISCONTINUED | OUTPATIENT
Start: 2018-03-28 | End: 2018-04-02

## 2018-03-28 RX ORDER — AMIODARONE HYDROCHLORIDE 200 MG/1
200 TABLET ORAL EVERY 8 HOURS SCHEDULED
Status: DISCONTINUED | OUTPATIENT
Start: 2018-03-28 | End: 2018-04-04 | Stop reason: HOSPADM

## 2018-03-28 RX ORDER — MIDAZOLAM HYDROCHLORIDE 1 MG/ML
INJECTION INTRAMUSCULAR; INTRAVENOUS AS NEEDED
Status: DISCONTINUED | OUTPATIENT
Start: 2018-03-28 | End: 2018-03-28 | Stop reason: SURG

## 2018-03-28 RX ORDER — MAGNESIUM HYDROXIDE 1200 MG/15ML
LIQUID ORAL AS NEEDED
Status: DISCONTINUED | OUTPATIENT
Start: 2018-03-28 | End: 2018-03-28 | Stop reason: HOSPADM

## 2018-03-28 RX ORDER — CHLORHEXIDINE GLUCONATE 0.12 MG/ML
15 RINSE ORAL 2 TIMES DAILY
Status: DISCONTINUED | OUTPATIENT
Start: 2018-03-28 | End: 2018-03-29

## 2018-03-28 RX ORDER — SODIUM CHLORIDE, SODIUM LACTATE, POTASSIUM CHLORIDE, CALCIUM CHLORIDE 600; 310; 30; 20 MG/100ML; MG/100ML; MG/100ML; MG/100ML
INJECTION, SOLUTION INTRAVENOUS CONTINUOUS PRN
Status: DISCONTINUED | OUTPATIENT
Start: 2018-03-28 | End: 2018-03-28 | Stop reason: SURG

## 2018-03-28 RX ORDER — ALBUMIN, HUMAN INJ 5% 5 %
12.5 SOLUTION INTRAVENOUS ONCE
Status: COMPLETED | OUTPATIENT
Start: 2018-03-28 | End: 2018-03-31

## 2018-03-28 RX ORDER — POTASSIUM CHLORIDE 14.9 MG/ML
20 INJECTION INTRAVENOUS ONCE AS NEEDED
Status: DISCONTINUED | OUTPATIENT
Start: 2018-03-28 | End: 2018-03-29

## 2018-03-28 RX ADMIN — LIDOCAINE HYDROCHLORIDE 10 ML: 10 INJECTION, SOLUTION INFILTRATION; PERINEURAL at 08:17

## 2018-03-28 RX ADMIN — METRONIDAZOLE 500 MG: 500 INJECTION, SOLUTION INTRAVENOUS at 01:58

## 2018-03-28 RX ADMIN — AMIODARONE HYDROCHLORIDE 200 MG: 200 TABLET ORAL at 15:19

## 2018-03-28 RX ADMIN — GLYCOPYRROLATE 0.8 MG: 0.2 INJECTION, SOLUTION INTRAMUSCULAR; INTRAVENOUS at 13:33

## 2018-03-28 RX ADMIN — FENTANYL CITRATE 250 MCG: 50 INJECTION, SOLUTION INTRAMUSCULAR; INTRAVENOUS at 12:40

## 2018-03-28 RX ADMIN — FENTANYL CITRATE 250 MCG: 50 INJECTION, SOLUTION INTRAMUSCULAR; INTRAVENOUS at 08:17

## 2018-03-28 RX ADMIN — METOPROLOL TARTRATE 12.5 MG: 25 TABLET ORAL at 06:21

## 2018-03-28 RX ADMIN — CEFAZOLIN SODIUM 2000 MG: 2 SOLUTION INTRAVENOUS at 12:42

## 2018-03-28 RX ADMIN — CEFAZOLIN SODIUM 2000 MG: 2 SOLUTION INTRAVENOUS at 08:50

## 2018-03-28 RX ADMIN — HEPARIN SODIUM 42000 UNITS: 1000 INJECTION INTRAVENOUS; SUBCUTANEOUS at 10:32

## 2018-03-28 RX ADMIN — CHLORHEXIDINE GLUCONATE 15 ML: 1.2 RINSE ORAL at 18:31

## 2018-03-28 RX ADMIN — SODIUM CHLORIDE, SODIUM LACTATE, POTASSIUM CHLORIDE, AND CALCIUM CHLORIDE 500 ML: .6; .31; .03; .02 INJECTION, SOLUTION INTRAVENOUS at 14:00

## 2018-03-28 RX ADMIN — PHENYLEPHRINE HYDROCHLORIDE 7 MCG/MIN: 10 INJECTION INTRAVENOUS at 16:00

## 2018-03-28 RX ADMIN — AMINOCAPROIC ACID 1000 MG/HR: 250 INJECTION, SOLUTION INTRAVENOUS at 09:51

## 2018-03-28 RX ADMIN — FENTANYL CITRATE 250 MCG: 50 INJECTION, SOLUTION INTRAMUSCULAR; INTRAVENOUS at 12:27

## 2018-03-28 RX ADMIN — ROCURONIUM BROMIDE 50 MG: 10 INJECTION INTRAVENOUS at 09:46

## 2018-03-28 RX ADMIN — SODIUM CHLORIDE: 0.9 INJECTION, SOLUTION INTRAVENOUS at 07:59

## 2018-03-28 RX ADMIN — POTASSIUM CHLORIDE 20 MEQ: 200 INJECTION, SOLUTION INTRAVENOUS at 14:30

## 2018-03-28 RX ADMIN — ALBUMIN (HUMAN) 12.5 G: 12.5 SOLUTION INTRAVENOUS at 19:45

## 2018-03-28 RX ADMIN — EPINEPHRINE 2 MCG/MIN: 1 INJECTION, SOLUTION INTRAMUSCULAR; SUBCUTANEOUS at 12:23

## 2018-03-28 RX ADMIN — ROCURONIUM BROMIDE 50 MG: 10 INJECTION INTRAVENOUS at 11:15

## 2018-03-28 RX ADMIN — ROCURONIUM BROMIDE 100 MG: 10 INJECTION INTRAVENOUS at 08:17

## 2018-03-28 RX ADMIN — MUPIROCIN 1 APPLICATION: 20 OINTMENT TOPICAL at 21:21

## 2018-03-28 RX ADMIN — MIDAZOLAM HYDROCHLORIDE 1 MG: 1 INJECTION, SOLUTION INTRAMUSCULAR; INTRAVENOUS at 08:00

## 2018-03-28 RX ADMIN — SODIUM CHLORIDE 20 ML/HR: 0.45 INJECTION, SOLUTION INTRAVENOUS at 13:45

## 2018-03-28 RX ADMIN — PROPOFOL 20 MCG/KG/MIN: 10 INJECTION, EMULSION INTRAVENOUS at 12:22

## 2018-03-28 RX ADMIN — SODIUM CHLORIDE, SODIUM LACTATE, POTASSIUM CHLORIDE, AND CALCIUM CHLORIDE: .6; .31; .03; .02 INJECTION, SOLUTION INTRAVENOUS at 07:59

## 2018-03-28 RX ADMIN — METOCLOPRAMIDE 10 MG: 5 INJECTION, SOLUTION INTRAMUSCULAR; INTRAVENOUS at 12:55

## 2018-03-28 RX ADMIN — EPINEPHRINE 2 MCG/MIN: 1 INJECTION, SOLUTION INTRAMUSCULAR; SUBCUTANEOUS at 13:45

## 2018-03-28 RX ADMIN — MIDAZOLAM HYDROCHLORIDE 6 MG: 1 INJECTION, SOLUTION INTRAMUSCULAR; INTRAVENOUS at 08:17

## 2018-03-28 RX ADMIN — SODIUM CHLORIDE, SODIUM LACTATE, POTASSIUM CHLORIDE, AND CALCIUM CHLORIDE 500 ML: .6; .31; .03; .02 INJECTION, SOLUTION INTRAVENOUS at 17:00

## 2018-03-28 RX ADMIN — MIDAZOLAM HYDROCHLORIDE 1 MG: 1 INJECTION, SOLUTION INTRAMUSCULAR; INTRAVENOUS at 07:55

## 2018-03-28 RX ADMIN — CEFTRIAXONE 1000 MG: 1 INJECTION, SOLUTION INTRAVENOUS at 18:32

## 2018-03-28 RX ADMIN — FENTANYL CITRATE 250 MCG: 50 INJECTION, SOLUTION INTRAMUSCULAR; INTRAVENOUS at 09:11

## 2018-03-28 RX ADMIN — PROPOFOL 50 MG: 10 INJECTION, EMULSION INTRAVENOUS at 08:17

## 2018-03-28 RX ADMIN — ATORVASTATIN CALCIUM 80 MG: 80 TABLET, FILM COATED ORAL at 15:44

## 2018-03-28 RX ADMIN — HEPARIN SODIUM 5000 UNITS: 1000 INJECTION INTRAVENOUS; SUBCUTANEOUS at 09:49

## 2018-03-28 RX ADMIN — MAGNESIUM SULFATE HEPTAHYDRATE 2 G: 40 INJECTION, SOLUTION INTRAVENOUS at 14:30

## 2018-03-28 RX ADMIN — NEOSTIGMINE METHYLSULFATE 5 MG: 1 INJECTION, SOLUTION INTRAMUSCULAR; INTRAVENOUS; SUBCUTANEOUS at 13:33

## 2018-03-28 RX ADMIN — ASPIRIN 325 MG: 325 TABLET ORAL at 15:19

## 2018-03-28 RX ADMIN — Medication 4 UNITS/HR: at 13:45

## 2018-03-28 RX ADMIN — ONDANSETRON 4 MG: 2 INJECTION INTRAMUSCULAR; INTRAVENOUS at 12:55

## 2018-03-28 RX ADMIN — Medication 5 G: at 09:51

## 2018-03-28 RX ADMIN — OXYCODONE HYDROCHLORIDE AND ACETAMINOPHEN 1 TABLET: 5; 325 TABLET ORAL at 15:19

## 2018-03-28 RX ADMIN — AMIODARONE HYDROCHLORIDE 200 MG: 200 TABLET ORAL at 21:21

## 2018-03-28 RX ADMIN — OXYCODONE HYDROCHLORIDE AND ACETAMINOPHEN 1 TABLET: 5; 325 TABLET ORAL at 18:31

## 2018-03-28 NOTE — CONSULTS
[address] 61 y o  male MRN: [id number]  Unit/Bed#: [facility] 416-01 Encounter: [id number]      Physician Requesting Consult: Mike Kothari MD    Reason for Consult / Principal Problem: Triple vessel coronary artery disease    HPI: Elmer Tirado is a 60 yo M who presented to [facility] with a gangrenous L heel and chest pains  He was found to have elevated troponins in the ED  He underwent ST which was positive and subsequent cardiac cath which revealed MV CAD  He was transferred to [facility] for cardiac surgical consultation  He was seen by Dr Joan Lima who recommended proceeding with surgical revascularization  The patient presents to the ICU post-op  PMH: ch LE edema, DM2, retinopathy, gastroparesis, orthostatic hypotension, HLD, h/o DVT, obesity, peripheral neuropathy    History obtained from chart review due to patient being intubated and sedated       PMH:   Past Medical History:   Diagnosis Date    Anemia     Anxiety and depression     Autonomic neuropathy     Cataract     Diabetes mellitus [facility]     Diabetic autonomic neuropathy associated with type 2 diabetes mellitus ([facility] )     Last Assessed: 12/28/2017    Gastroparesis due to DM ([facility] )     History of DVT (deep vein thrombosis)     left LE    HTN (hypertension)     Hyperlipidemia     Non healing left heel wound     Obesity     Orthostatic hypotension        PSH:   Past Surgical History:   Procedure Laterality Date    ROTATOR CUFF REPAIR Bilateral        Family History:   Family History   Problem Relation Age of Onset    Atrial fibrillation Mother     Stroke Mother     Hypertension Father     Heart attack Paternal Grandfather 61       Social History:   History   Smoking Status    Former Smoker    Packs/day: 1 00    Years: 12 00    Types: Cigarettes    Quit date: 3/23/1994   Smokeless Tobacco    Never Used      History   Alcohol Use No      Marital Status: /Civil Union    ROS: ROS unable to be obtained secondary to intubation and sedation  Allergies: Allergies   Allergen Reactions    Metformin        Home Medications:   Prior to Admission medications    Medication Sig Start Date End Date Taking?  Authorizing Provider   atorvastatin (LIPITOR) 40 mg tablet Take 1 tablet (40 mg total) by mouth daily 2/20/18   Monika Harris DO   insulin aspart protamine-insulin aspart (NOVOLOG MIX 70/30 FLEXPEN) 100 Units/mL SUPN Inject under the skin Twice daily 2/11/15   Historical Provider, MD   LANTUS SOLDUTCHAR injection pen 100 units/mL Inject 0 8 mL (80 Units total) under the skin 2 (two) times a day 2/20/18   Monika Harris DO   LORazepam (ATIVAN) 0 5 mg tablet Take by mouth every 8 (eight) hours as needed for anxiety    Historical Provider, MD   metoclopramide (REGLAN) 5 mg tablet Take 5 mg by mouth daily      Historical Provider, MD   midodrine (PROAMATINE) 5 mg tablet Take 1 tablet (5 mg total) by mouth 3 (three) times a day 2/20/18   Monika Harris DO   torsemide (DEMADEX) 10 mg tablet Take 1 tablet (10 mg total) by mouth daily 2/21/18   Monika Harris DO       Inpatient Medications:  Scheduled Meds:    Current Facility-Administered Medications:  acetaminophen 650 mg Rectal Q4H PRN M D C  NUNO Lino    acetaminophen 650 mg Oral Q4H PRN  D C  NUNO Lino    amiodarone 200 mg Oral Q8H [facility] Shay Davis PA-C    aspirin 325 mg Oral Daily Shay Davis PA-C    atorvastatin 80 mg Oral Daily With NUNO Singh    bisacodyl 10 mg Rectal Daily PRN Shay Davis PA-C    calcium chloride 1 g Intravenous Once M D C  NUNO Lino    cefTRIAXone 1,000 mg Intravenous Q24H Fiona Fairbanks MD Last Rate: Stopped (03/27/18 1810)   chlorhexidine 15 mL Mouth/Throat BID Shay Davis PA-C    epinephrine 1-20 mcg/min Intravenous Titrated Claire Layne PA-C    [START ON 3/29/2018] fondaparinux 2 5 mg Subcutaneous Daily Shay Davis PA-C    furosemide 40 mg Intravenous Q6H PRN M D C  ANNY Lino-C HYDROmorphone 0 5 mg Intravenous Q1H PRN Shay Davis PA-C    HYDROmorphone 1 mg Intravenous Q1H PRN Shay Davis PA-C    insulin regular (HumuLIN R,NovoLIN R) infusion 0 3-21 Units/hr Intravenous Titrated Shay Davis PA-C    lactated ringers 500 mL Intravenous Q30 Min PRN Shay Davis PA-C    lidocaine (cardiac) 100 mg Intravenous Q30 Min PRN Shay Davis PA-C    magnesium sulfate 2 g Intravenous Once M D C  Holdings[employer]UNO    mupirocin 1 application Nasal [id number] [address] Shay Davis PA-C    niCARdipine 2 5-15 mg/hr Intravenous Titrated Shay Davis PA-C    ondansetron 4 mg Intravenous Q6H PRN M D C  [employer]UNO    oxyCODONE-acetaminophen 1 tablet Oral Q4H PRN M D C  [employer]UNO    oxyCODONE-acetaminophen 2 tablet Oral Q6H PRN M D C  [employer]UNO    [START ON 3/29/2018] pantoprazole 40 mg Oral Early Morning Shay Davis PA-C    [START ON 3/29/2018] polyethylene glycol 17 g Oral Daily Shay Davis PA-C    potassium chloride 20 mEq Intravenous Once PRN Shay Davis PA-C    potassium chloride 20 mEq Intravenous Q1H PRN Shay Davis PA-C    potassium chloride 20 mEq Intravenous Q30 Min PRN Shay Davis PA-C    sodium chloride 20 mL/hr Intravenous Continuous Shay Davis PA-C      Continuous Infusions:    epinephrine 1-20 mcg/min   insulin regular (HumuLIN R,NovoLIN R) infusion 0 3-21 Units/hr   niCARdipine 2 5-15 mg/hr   sodium chloride 20 mL/hr     PRN Meds:    acetaminophen 650 mg Q4H PRN   acetaminophen 650 mg Q4H PRN   bisacodyl 10 mg Daily PRN   furosemide 40 mg Q6H PRN   HYDROmorphone 0 5 mg Q1H PRN   HYDROmorphone 1 mg Q1H PRN   lactated ringers 500 mL Q30 Min PRN   lidocaine (cardiac) 100 mg Q30 Min PRN   ondansetron 4 mg Q6H PRN   oxyCODONE-acetaminophen 1 tablet Q4H PRN   oxyCODONE-acetaminophen 2 tablet Q6H PRN   potassium chloride 20 mEq Once PRN   potassium chloride 20 mEq Q1H PRN   potassium chloride 20 mEq Q30 Min PRN       VTE Pharmacologic Prophylaxis: Fondaparinux (Arixtra)  VTE Mechanical Prophylaxis: sequential compression device    Invasive lines and devices: Invasive Devices     Central Venous Catheter Line            CVC Central Lines 18 Triple less than 1 day    Introducer 18 less than 1 day          Peripheral Intravenous Line            Peripheral IV 18 Left Hand 2 days    Peripheral IV 18 Right Antecubital less than 1 day          Arterial Line            Arterial Line 18 Right Radial less than 1 day          Line            Pacer Wires less than 1 day    Pacer Wires less than 1 day          Drain            Chest Tube 1 Anterior Mediastinal 32 Fr  less than 1 day    Chest Tube 2 Left Pleural 32 Fr  less than 1 day    Chest Tube 3 Posterior Mediastinal 32 Fr  less than 1 day    NG/OG/Enteral Tube Orogastric 18 Fr less than 1 day    Urethral Catheter Temperature probe 16 Fr  less than 1 day          Airway            ETT  Hi-Lo; Cuffed;Oral 8 mm less than 1 day                Vitals:   Vitals:    18 2350 18 0312 18 0552 18 1335   BP: 118/60 131/65  114/53   BP Location: Left arm Left arm     Pulse: 71 72  87   Resp: 18 18  15   Temp: 98 1 °F (36 7 °C) 98 6 °F (37 °C)  (!) 97 3 °F (36 3 °C)   TempSrc: Oral Oral  Probe   SpO2: 98% 98%  100%   Weight:   117 kg (258 lb 13 1 oz)    Height:                 Temperature: Temp (24hrs), Av °F (36 7 °C), Min:97 3 °F (36 3 °C), Max:98 6 °F (37 °C)  Current: Temperature: (!) 97 3 °F (36 3 °C)    Weights: IBW: 79 9 kg  Body mass index is 34 15 kg/m²  Hemodynamic Monitoring:  PAP:  , CVP:  , CO:  , CI:  , SVR:      Ventilator Settings:   Vent Mode: AC/VC  Resp Rate (BPM): 15 BPM  Vt (mL): 500 mL  FIO2 (%): 50 %  PEEP (cmH2O): 5 cmH2O  SpO2: 100 %    Physical Exam:    Constitutional: Well developed intubated male in NAD  HENT: Atraumatic  Intubated  Neck: Neck supple  Patient intubated  Central line in place  Cardiovascular: Regular rate and rhythm  No murmur heard  Exam reveals no rub  Pulmonary/Chest: Breath sounds clear bilaterally  Abdominal: Soft  No distension  Musculoskeletal: 1+ edema  Neurological: Patient is sedated  Skin: Skin is warm and dry  Psychiatric: Unable to assess because patient is sedated and intubated  Labs/Imaging:     Results from last 7 days  Lab Units 18  1422 18  1343 18  1238  18  1218  18  0524 18  0431 18  0544   WBC Thousand/uL  --   --   --   --   --   --  10 96* 9 87 8 71   HEMOGLOBIN g/dL  --  10 3*  --   --   --   --  11 1* 11 8* 11 9*   I STAT HEMOGLOBIN g/dl 10 2*  --  8 8*  < >  --   < >  --   --   --    HEMATOCRIT %  --  32 8*  --   --   --   --  34 6* 36 0* 36 4*   PLATELETS Thousands/uL  --  214  --   --  273  --  273 286 291   NEUTROS PCT %  --   --   --   --   --   --  67 69 67   MONOS PCT %  --   --   --   --   --   --  7 7 7   < > = values in this interval not displayed  Results from last 7 days  Lab Units 18  1422 18  1343 18  1238  18  0431   SODIUM mmol/L  --  138  --   --  133* 134*   POTASSIUM mmol/L  --  3 4*  --   --  3 9 3 9   CHLORIDE mmol/L  --  104  --   --  98* 99*   CO2 mmol/L  --  24  --   --  27 26   BUN mg/dL  --  13  --   --  17 17   CREATININE mg/dL  --  0 85  --   --  0 99 0 99   CALCIUM mg/dL  --  8 1*  --   --  8 4 8 8   GLUCOSE RANDOM mg/dL  --  168*  --   --  143* 120   GLUCOSE, ISTAT mg/dl 165*  --  151*  < >  --   --    < > = values in this interval not displayed  Results from last 7 days  Lab Units 18  0518  04318  0544   MAGNESIUM mg/dL 1 9 2 0 2 1           Post-op AB 32/51/163/27/99%  Post-op CXR: Lines and tubes in good position  No pTX  Lungs clear  I have personally reviewed pertinent films in PACS  Post-op EKG: NSR  Lateral ST depression  Unchanged from prior tracing  This was personally reviewed by myself  Impression:  Principal Problem:    Triple vessel coronary artery disease  Active Problems:    S/P CABG x 3    Acute respiratory insufficiency, postoperative    PAD (peripheral artery disease) (HCC)    Non-healing wound of left heel    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 (HCC)    Diabetic neuropathy, type II diabetes mellitus (HCC)    Diabetic gastroparesis (HCC)    Orthostatic hypotension    Hyperlipidemia    Acute on chronic diastolic heart failure (HCC)    Bilateral lower extremity edema    Anxiety and depression    Class 2 obesity due to excess calories with serious comorbidity and body mass index (BMI) of 35 0 to 35 9 in adult      Plan:    Neuro: D/C sedation  PRN dilaudid and percocet for pain  Trend neuro exam  Regulate sleep/wake cycle  Delirium precautions  CV: MAP goal >65  SBP goal <130  CI>2 2  Post-op medications: Epinephrine, 2 mcg/min  Wean to off as able  Volume resuscitation as needed  Monitor rhythm  Epicardial pacing wires in place  Will pace as needed for bradycardia or heart block  Intra-op AMANDEEP NLVEF  Lung: Check STAT post-op ABG and CXR  Wean vent with spontaneous breathing trial with goal to extubate today  GI: GI prophylaxis with PPI  Bowel regimen  Zofran PRN for nausea  FEN: NPO  Replete K >4 0, Mag >2 0 and calcium >7 0  : Check STAT post-op BMP  Stout in place  Monitor UOP with goal >0 5cc/kg/hour  Lasix prn  Volume resuscitate as needed  ID: Prophylactic post-op abx  Maintain normothermia  Heme: Check STAT post-op H/H and platelets  Monitor incision site, invasive lines, and chest tube outputs for bleeding  Send coag panel if CT outputs high  Endo: Insulin gtt for blood sugar <180  Disposition: ICU Care    Counseling / Coordination of Care  Total Critical Care time spent 0 minutes excluding procedures, teaching and family updates        SIGNATURE: Shana Quintana PA-C  DATE: March 28, 2018  TIME: 2:35 PM

## 2018-03-28 NOTE — ANESTHESIA POSTPROCEDURE EVALUATION
Post-Op Assessment Note      CV Status:  Stable    Mental Status:  Unresponsive    Hydration Status:  Stable    PONV Controlled:  None    Airway Patency:  Patent  Airway: intubated    Post Op Vitals Reviewed: Yes          Staff: Anesthesiologist       Comments: Intubated and sedated in ICU          /53 (03/28/18 1335)    Temp (!) 97 3 °F (36 3 °C) (03/28/18 1335)    Pulse 87 (03/28/18 1335)   Resp 15 (03/28/18 1335)    SpO2 100 % (03/28/18 1335)

## 2018-03-28 NOTE — PHYSICAL THERAPY NOTE
Physical Therapy Cancellation Note    PT session cancelled for today as patient in OR for CABG  Re-consult Physical Therapy post OP       Mau Luis, PTA

## 2018-03-28 NOTE — OP NOTE
OPERATIVE REPORT  PATIENT NAME: Romy Multani    :  1960  MRN: [id number]  Pt Location: [facility] OR ROOM 16    SURGERY DATE: 3/28/2018    SURGEON: Anoop Jenkins MD    ASSISTANT: Pedro Luis Hines PA-C    ADDITIONAL ASSISTANT: n/a    PREOPERATIVE DIAGNOSIS:  Multivessel coronary artery disease and s/p Acute NSTEMI    POSTOPERATIVE DIAGNOSIS:  Multivessel coronary artery disease and s/p Acute NSTEMI    NYHA: 2  CCS: 4    PROCEDURE:   Coronary artery bypass grafting x 3 with left internal mammary artery to left anterior descending artery, saphenous vein graft to obtuse marginal 2 and saphenous vein graft to right posterior descending artery    CARDIOPULMONARY BYPASS TIME: 77 minutes  CROSSCLAMP TIME: 65 minutes  ANESTHESIA: General endotracheal anesthesia with transesophageal echocardiogram  guidance, Dr Margarito Murray    INDICATIONS:  The patient is a 61y o  year-old male diagnosed with Multivessel coronary artery disease and s/p Acute NSTEMI  Coronary angiography showed LAD 70%, CX 80%, %, small PDA fills by collaterals  I saw the patient in consultation and recommended the procedure described previously  FINDINGS:  1  Intraoperative transesophageal echocardiogram revealed normal EF, no significant valvular disease  2  Epiaortic ultrasound showed less than 5 mm non-mobile plaque  3  Coronary anatomy as described in coronary angiography, PDA small with poor runoff, OM1 small  4  Final transesophageal echocardiogram demonstrated no changes  OPERATIVE TECHNIQUE:    The patient was taken to the operating room, properly identified and placed supine on the operating table  Following the satisfactory induction of general anesthesia and placement of monitoring lines, the patient was prepped and draped in the usual sterile fashion  A time-out procedure was performed      The patient underwent median sternotomy, pericardiotomy, left internal mammary artery harvest with the standard technique and endoscopic right greater saphenous vein harvest, systemic heparinization and conduit preparation  Epiaortic ultrasound showed less than 5 mm non-mobile plaque  Cannulation for cardiopulmonary bypass was performed with an arterial dispersion cannula, double stage venous cannula and a vented antegrade cardioplegia cannula  Once the ACT was greater than 480 seconds we placed the patient on cardiopulmonary bypass, the ascending aorta was crossclamped and cardiac arrest was obtained without complications with [place] cardioplegia  The following grafts were completed, left internal mamamry artery to left anterior descending artery with excellent flow, saphenous vein graft to obtuse marginal 2 with flow of 80 ml/min and saphenous vein graft to right posterior descending artery with flow of 50 ml/min  All the distal anastomoses were performed in end-to-side fashion with 7-0 Prolene  A total of 2 proximal anastomoses were completed on the ascending aorta in end-to-side fashion using running 6-0 Prolene suture  The patient was placed in the Trendelenburg position, the heart was de-aired, a hotshot was given and the crossclamp was removed  Atrial and ventricular pacing wires were placed  Following a period of reperfusion, the patient was weaned from cardiopulmonary bypass and decannulated  Protamine was administered with normalization of the ACT  Hemostasis was confirmed in all fields  Thoracostomy tubes were placed  The sternum was closed with stainless steel wires  The fascia, subdermis and skin were closed with multiple layers of running absorbable suture  COMPLICATIONS: none  PACKS/TUBES/DRAINS: Chest tubes x 3  EBL: 200 cc  TRANSFUSION: none  SPECIMENS: None  As the attending surgeon, I was present and scrubbed for all critical portions of this procedure  There was no qualified surgical resident available  Sponge, needle and instrument counts were reported as correct by the nursing staff   The assistance of a PA was required to complete this case, specifically for assistance with endoscopic saphenous vein harvesting, cannulation, decannulation, and construction of the bypass grafts             SIGNATURE: Mike Kothari MD  DATE: March 28, 2018  TIME: 1:00 PM

## 2018-03-28 NOTE — RESPIRATORY THERAPY NOTE
RT Ventilator Management Note  Ángel Santos 61 y o  male MRN: [id number]  Unit/Bed#: [facility] 416-01 Encounter: [id number]      Daily Screen       3/28/2018 1454 3/28/2018 1559          Patient safety screen outcome[de-identified] Passed Passed      Spont breathing trial % for 30 min: Yes Yes      Spont breathing trial outcome[de-identified] - Passed      Name of Medical Team Notified[de-identified] - SUBHASH Orta      Preparing to extubate/ Notify Nurse: - Yes      Extubation order obtained: - Yes      Consider Cuff Test: - Yes      Patient extubated: - Yes      RSBI: 37 36      Additional Mechanics Requested: - Yes      FVC (mL): - 1600              Physical Exam:   Assessment Type: Pre-treatment  General Appearance: Drowsy, Awake  Respiratory Pattern: Normal  Chest Assessment: Chest expansion symmetrical  Bilateral Breath Sounds: Clear      Resp Comments: pt extubated without incident  bs clear, no stridor  IS instructed

## 2018-03-28 NOTE — ANESTHESIA PROCEDURE NOTES
Central Line Insertion  Performed by: Niko Austin by: Jules Membreno   Date/Time: 3/28/2018 8:37 AM  Catheter Type:  triple lumen  Indications: vascular access  Location details: right internal jugular  Catheter size: 7 Fr  Patient position: Trendelenburg    Sedation:  Patient sedated: GETA  Assessment: blood return through all ports and free fluid flow  Preparation: skin prepped with 2% chlorhexidine  Skin prep agent dried: skin prep agent completely dried prior to procedure  Sterile barriers: all five maximum sterile barriers used - cap, mask, sterile gown, sterile gloves, and large sterile sheet  Hand hygiene: hand hygiene performed prior to central venous catheter insertion  Ultrasound guidance: yes  sterile gel and probe cover used in ultrasound-guided central venous catheter insertionultrasound permanent image saved  Consent: Verbal consent obtained  Written consent obtained    Risks and benefits: risks, benefits and alternatives were discussed  Consent given by: patient  Patient understanding: patient states understanding of the procedure being performed  Patient consent: the patient's understanding of the procedure matches consent given  Procedure consent: procedure consent matches procedure scheduled  Relevant documents: relevant documents present and verified  Patient identity confirmed: verbally with patient, arm band, provided demographic data, hospital-assigned identification number and anonymous protocol, patient vented/unresponsive  Pre-procedure: landmarks identified  Number of attempts: 1  Successful placement: yes  Post-procedure: line sutured,  dressing applied and chlorhexidine patch applied  Patient tolerance: Patient tolerated the procedure well with no immediate complications

## 2018-03-28 NOTE — ANESTHESIA PROCEDURE NOTES
Procedure Performed: AMANDEEP Anesthesia  Start Time:  3/28/2018 8:50 AM  End Time:   3/28/2018 1:20 PM      Preanesthesia Checklist    Patient identified, IV assessed, risks and benefits discussed, monitors and equipment assessed, procedure being performed at surgeon's request and anesthesia consent obtained  Procedure    Diagnostic Indications for AMANDEEP:  assessment of ascending aorta and hemodynamic monitoring  Type of AMANDEEP: monitor patient  Images Saved: ultrasound permanent image saved  Physician Requesting Echo: Cat Padron  Location performed: OR  Intubated  Heart visualized  Insertion of AMANDEEP Probe:  Easy  Probe Type:  Epiaortic  Modalities:  3D, continuous wave Doppler, color flow mapping and pulse wave Doppler  Echocardiographic and Doppler Measurements    PREPROCEDURE    LEFT VENTRICLE:  Systolic Function: normal  Ejection Fraction: 60%  Cavity size: normal      RIGHT VENTRICLE:  Systolic Function: normal   Cavity size normal  No hypertrophy              AORTIC VALVE:  Leaflets: normal  Leaflet motions normal and normal  Stenosis: none  Regurgitation: none  MITRAL VALVE:  Leaflets: normal  Leaflet Motions: normal  Regurgitation: none  Stenosis: none  TRICUSPID VALVE:  Leaflets: normal  Leaflet Motions: normal  Stenosis: none  Regurgitation: trace  PULMONIC VALVE:  Leaflets: normal  Regurgitation: none  Stenosis: none  ASCENDING AORTA:  Size:  normal  Dissection not present  AORTIC ARCH:  Size:  normal  dissection not present  Grade 3: atheroma protruding < 0 5 cm into lumen  DESCENDING AORTA:  Size: normal  Dissection not present  Grade 3: atheroma protruding < 0 5 cm into lumen          RIGHT ATRIUM:  Size:  normal  No spontaneous echo contrast     LEFT ATRIUM:  Size: normal  No spontaneous echo contrast     LEFT ATRIAL APPENDAGE:  Size: normal  No spontaneous echo contrast         ATRIAL SEPTUM:  Intra-atrial septal morphology: normal          VENTRICULAR SEPTUM:  Intra-ventricular septum morphology: normal          EPIAORTIC:  Plaque Thickness: 0-5 mm  OTHER FINDINGS:  Pericardium:  normal  Pleural Effusion:  none  POSTPROCEDURE    LEFT VENTRICLE: Unchanged   RIGHT VENTRICLE: Unchanged   AORTIC VALVE: Unchanged   MITRAL VALVE: Unchanged   TRICUSPID VALVE: Unchanged   PULMONIC VALVE: Unchanged           ATRIA: Unchanged   AORTA: Unchanged   REMOVAL:  Probe Removal: atraumatic

## 2018-03-28 NOTE — ANESTHESIA PROCEDURE NOTES
Arterial Line Insertion  Date/Time: 3/28/2018 8:09 AM  Performed by: Chanel Turcios by: David Campbell   Consent: Verbal consent obtained  Written consent obtained  Risks and benefits: risks, benefits and alternatives were discussed  Consent given by: patient  Patient understanding: patient states understanding of the procedure being performed  Preparation: Patient was prepped and draped in the usual sterile fashion    Indications: hemodynamic monitoring  Orientation:  RightLocation: radial artery  Anesthesia: local infiltration    Anesthesia:  Local Anesthetic: lidocaine 1% without epinephrine    Sedation:  Patient sedated: yes  Sedation type: anxiolysis    Garrett's test normal: yes  Needle gauge: 20  Number of attempts: 1  Post-procedure: dressing applied  Post-procedure CNS: unchanged  Patient tolerance: Patient tolerated the procedure well with no immediate complications

## 2018-03-28 NOTE — ANESTHESIA PREPROCEDURE EVALUATION
Review of Systems/Medical History  Patient summary reviewed  Chart reviewed  No history of anesthetic complications     Cardiovascular  EKG reviewed, Exercise tolerance: poor,  Hyperlipidemia, Hypertension , Past MI 0-3 months, CAD, ,    Pulmonary  Negative pulmonary ROS        GI/Hepatic      Comment: Gastroparesis     Negative  ROS        Endo/Other  Diabetes poorly controlled type 2 Insulin,      GYN  Negative gynecology ROS          Hematology  Anemia ,     Musculoskeletal    Comment: Obesity      Neurology    Diabetic neuropathy (With autonomic neuropathy),    Psychology   Negative psychology ROS              Physical Exam    Airway    Mallampati score: III  TM Distance: >3 FB  Neck ROM: full     Dental   No notable dental hx     Cardiovascular      Pulmonary      Other Findings        Anesthesia Plan  ASA Score- 4     Anesthesia Type- general with ASA Monitors  Additional Monitors: arterial line, central venous line and pulmonary artery catheter  Airway Plan: ETT  Comment: Intraop AMANDEEP, Post-op intubation, possible transfusion  Plan Factors- Patient instructed to abstain from smoking on day of procedure  Patient did not smoke on day of surgery  Induction- intravenous  Postoperative Plan- Plan for postoperative opioid use  Informed Consent- Anesthetic plan and risks discussed with patient

## 2018-03-28 NOTE — ANESTHESIA PROCEDURE NOTES
Introducer/[place]  Performed by: Ivan Conte by: Jesu Mccall   Date/Time: 3/28/2018 8:37 AM  Indications: central pressure monitoring and vascular access  Location details: right internal jugular  Catheter size: 9 Fr  Patient position: Trendelenburg    Sedation:  Patient sedated: GETA  Assessment: blood return through all ports and free fluid flow  Preparation: skin prepped with 2% chlorhexidine  Skin prep agent dried: skin prep agent completely dried prior to procedure  Sterile barriers: all five maximum sterile barriers used - cap, mask, sterile gown, sterile gloves, and large sterile sheet  Hand hygiene: hand hygiene performed prior to central venous catheter insertion  Ultrasound guidance: yes  ultrasound permanent image saved  Consent: Verbal consent obtained  Written consent obtained    Risks and benefits: risks, benefits and alternatives were discussed  Consent given by: patient  Patient understanding: patient states understanding of the procedure being performed  Patient consent: the patient's understanding of the procedure matches consent given  Procedure consent: procedure consent matches procedure scheduled  Relevant documents: relevant documents present and verified  Patient identity confirmed: verbally with patient, arm band, provided demographic data, hospital-assigned identification number and anonymous protocol, patient vented/unresponsive  Pre-procedure: landmarks identified  Number of attempts: 1  Successful placement: yes  Post-procedure: line sutured and dressing applied  Patient tolerance: Patient tolerated the procedure well with no immediate complications

## 2018-03-29 ENCOUNTER — APPOINTMENT (INPATIENT)
Dept: RADIOLOGY | Facility: HOSPITAL | Age: 58
DRG: 235 | End: 2018-03-29
Payer: COMMERCIAL

## 2018-03-29 PROBLEM — I95.1 ORTHOSTATIC HYPOTENSION: Status: RESOLVED | Noted: 2018-03-16 | Resolved: 2018-03-29

## 2018-03-29 PROBLEM — E11.43 DIABETIC AUTONOMIC NEUROPATHY ASSOCIATED WITH TYPE 2 DIABETES MELLITUS (HCC): Status: ACTIVE | Noted: 2018-03-29

## 2018-03-29 LAB
ANION GAP SERPL CALCULATED.3IONS-SCNC: 6 MMOL/L (ref 4–13)
ATRIAL RATE: 67 BPM
BUN SERPL-MCNC: 12 MG/DL (ref 5–25)
CALCIUM SERPL-MCNC: 7.6 MG/DL (ref 8.3–10.1)
CHLORIDE SERPL-SCNC: 106 MMOL/L (ref 100–108)
CO2 SERPL-SCNC: 27 MMOL/L (ref 21–32)
CREAT SERPL-MCNC: 0.69 MG/DL (ref 0.6–1.3)
ERYTHROCYTE [DISTWIDTH] IN BLOOD BY AUTOMATED COUNT: 14.4 % (ref 11.6–15.1)
GFR SERPL CREATININE-BSD FRML MDRD: 105 ML/MIN/1.73SQ M
GLUCOSE SERPL-MCNC: 122 MG/DL (ref 65–140)
GLUCOSE SERPL-MCNC: 125 MG/DL (ref 65–140)
GLUCOSE SERPL-MCNC: 134 MG/DL (ref 65–140)
GLUCOSE SERPL-MCNC: 134 MG/DL (ref 65–140)
GLUCOSE SERPL-MCNC: 135 MG/DL (ref 65–140)
GLUCOSE SERPL-MCNC: 138 MG/DL (ref 65–140)
GLUCOSE SERPL-MCNC: 157 MG/DL (ref 65–140)
GLUCOSE SERPL-MCNC: 173 MG/DL (ref 65–140)
GLUCOSE SERPL-MCNC: 191 MG/DL (ref 65–140)
GLUCOSE SERPL-MCNC: 197 MG/DL (ref 65–140)
GLUCOSE SERPL-MCNC: 94 MG/DL (ref 65–140)
GLUCOSE SERPL-MCNC: 94 MG/DL (ref 65–140)
HCT VFR BLD AUTO: 30.6 % (ref 36.5–49.3)
HGB BLD-MCNC: 9.6 G/DL (ref 12–17)
MAGNESIUM SERPL-MCNC: 2.3 MG/DL (ref 1.6–2.6)
MCH RBC QN AUTO: 27.5 PG (ref 26.8–34.3)
MCHC RBC AUTO-ENTMCNC: 31.4 G/DL (ref 31.4–37.4)
MCV RBC AUTO: 88 FL (ref 82–98)
P AXIS: 63 DEGREES
PLATELET # BLD AUTO: 194 THOUSANDS/UL (ref 149–390)
PMV BLD AUTO: 9.9 FL (ref 8.9–12.7)
POTASSIUM SERPL-SCNC: 5 MMOL/L (ref 3.5–5.3)
PR INTERVAL: 138 MS
QRS AXIS: -12 DEGREES
QRSD INTERVAL: 88 MS
QT INTERVAL: 388 MS
QTC INTERVAL: 410 MS
RBC # BLD AUTO: 3.49 MILLION/UL (ref 3.88–5.62)
SODIUM SERPL-SCNC: 139 MMOL/L (ref 136–145)
T WAVE AXIS: 75 DEGREES
VENTRICULAR RATE: 67 BPM
WBC # BLD AUTO: 11.02 THOUSAND/UL (ref 4.31–10.16)

## 2018-03-29 PROCEDURE — 99233 SBSQ HOSP IP/OBS HIGH 50: CPT | Performed by: EMERGENCY MEDICINE

## 2018-03-29 PROCEDURE — 99024 POSTOP FOLLOW-UP VISIT: CPT | Performed by: THORACIC SURGERY (CARDIOTHORACIC VASCULAR SURGERY)

## 2018-03-29 PROCEDURE — G8988 SELF CARE GOAL STATUS: HCPCS

## 2018-03-29 PROCEDURE — G8978 MOBILITY CURRENT STATUS: HCPCS

## 2018-03-29 PROCEDURE — 71045 X-RAY EXAM CHEST 1 VIEW: CPT

## 2018-03-29 PROCEDURE — G8979 MOBILITY GOAL STATUS: HCPCS

## 2018-03-29 PROCEDURE — 82948 REAGENT STRIP/BLOOD GLUCOSE: CPT

## 2018-03-29 PROCEDURE — P9021 RED BLOOD CELLS UNIT: HCPCS

## 2018-03-29 PROCEDURE — 93005 ELECTROCARDIOGRAM TRACING: CPT

## 2018-03-29 PROCEDURE — 99232 SBSQ HOSP IP/OBS MODERATE 35: CPT | Performed by: INTERNAL MEDICINE

## 2018-03-29 PROCEDURE — 80048 BASIC METABOLIC PNL TOTAL CA: CPT | Performed by: PHYSICIAN ASSISTANT

## 2018-03-29 PROCEDURE — 85027 COMPLETE CBC AUTOMATED: CPT | Performed by: PHYSICIAN ASSISTANT

## 2018-03-29 PROCEDURE — 94640 AIRWAY INHALATION TREATMENT: CPT | Performed by: SOCIAL WORKER

## 2018-03-29 PROCEDURE — 97167 OT EVAL HIGH COMPLEX 60 MIN: CPT

## 2018-03-29 PROCEDURE — 94760 N-INVAS EAR/PLS OXIMETRY 1: CPT | Performed by: SOCIAL WORKER

## 2018-03-29 PROCEDURE — G8987 SELF CARE CURRENT STATUS: HCPCS

## 2018-03-29 PROCEDURE — 97164 PT RE-EVAL EST PLAN CARE: CPT

## 2018-03-29 PROCEDURE — 83735 ASSAY OF MAGNESIUM: CPT | Performed by: PHYSICIAN ASSISTANT

## 2018-03-29 RX ORDER — DOCUSATE SODIUM 100 MG/1
100 CAPSULE, LIQUID FILLED ORAL 2 TIMES DAILY
Status: DISCONTINUED | OUTPATIENT
Start: 2018-03-29 | End: 2018-04-04 | Stop reason: HOSPADM

## 2018-03-29 RX ORDER — FUROSEMIDE 10 MG/ML
40 INJECTION INTRAMUSCULAR; INTRAVENOUS DAILY
Status: DISCONTINUED | OUTPATIENT
Start: 2018-03-29 | End: 2018-03-29

## 2018-03-29 RX ORDER — POTASSIUM CHLORIDE 20 MEQ/1
20 TABLET, EXTENDED RELEASE ORAL DAILY
Status: DISCONTINUED | OUTPATIENT
Start: 2018-03-29 | End: 2018-03-29

## 2018-03-29 RX ORDER — ALBUMIN, HUMAN INJ 5% 5 %
12.5 SOLUTION INTRAVENOUS ONCE
Status: COMPLETED | OUTPATIENT
Start: 2018-03-29 | End: 2018-03-31

## 2018-03-29 RX ORDER — ALBUMIN, HUMAN INJ 5% 5 %
25 SOLUTION INTRAVENOUS ONCE
Status: COMPLETED | OUTPATIENT
Start: 2018-03-29 | End: 2018-03-29

## 2018-03-29 RX ORDER — MIDODRINE HYDROCHLORIDE 5 MG/1
5 TABLET ORAL
Status: DISCONTINUED | OUTPATIENT
Start: 2018-03-29 | End: 2018-03-30

## 2018-03-29 RX ORDER — TEMAZEPAM 15 MG/1
15 CAPSULE ORAL
Status: DISCONTINUED | OUTPATIENT
Start: 2018-03-29 | End: 2018-04-04 | Stop reason: HOSPADM

## 2018-03-29 RX ORDER — TAMSULOSIN HYDROCHLORIDE 0.4 MG/1
0.4 CAPSULE ORAL
Status: DISCONTINUED | OUTPATIENT
Start: 2018-03-29 | End: 2018-04-04 | Stop reason: HOSPADM

## 2018-03-29 RX ORDER — POTASSIUM CHLORIDE 750 MG/1
10 TABLET, EXTENDED RELEASE ORAL 2 TIMES DAILY
Status: DISCONTINUED | OUTPATIENT
Start: 2018-03-29 | End: 2018-04-04 | Stop reason: HOSPADM

## 2018-03-29 RX ORDER — FUROSEMIDE 10 MG/ML
20 INJECTION INTRAMUSCULAR; INTRAVENOUS
Status: DISCONTINUED | OUTPATIENT
Start: 2018-03-29 | End: 2018-04-02

## 2018-03-29 RX ORDER — ALBUMIN, HUMAN INJ 5% 5 %
SOLUTION INTRAVENOUS
Status: COMPLETED
Start: 2018-03-29 | End: 2018-03-29

## 2018-03-29 RX ADMIN — FONDAPARINUX SODIUM 2.5 MG: 2.5 INJECTION, SOLUTION SUBCUTANEOUS at 08:12

## 2018-03-29 RX ADMIN — POTASSIUM CHLORIDE 20 MEQ: 1500 TABLET, EXTENDED RELEASE ORAL at 08:12

## 2018-03-29 RX ADMIN — CEFTRIAXONE 1000 MG: 1 INJECTION, SOLUTION INTRAVENOUS at 17:37

## 2018-03-29 RX ADMIN — FUROSEMIDE 40 MG: 10 INJECTION, SOLUTION INTRAMUSCULAR; INTRAVENOUS at 08:12

## 2018-03-29 RX ADMIN — ALBUMIN, HUMAN INJ 5% 12.5 G: 5 SOLUTION at 02:05

## 2018-03-29 RX ADMIN — OXYCODONE HYDROCHLORIDE AND ACETAMINOPHEN 2 TABLET: 5; 325 TABLET ORAL at 21:39

## 2018-03-29 RX ADMIN — TAMSULOSIN HYDROCHLORIDE 0.4 MG: 0.4 CAPSULE ORAL at 17:36

## 2018-03-29 RX ADMIN — Medication 2 UNITS/HR: at 22:27

## 2018-03-29 RX ADMIN — ATORVASTATIN CALCIUM 80 MG: 80 TABLET, FILM COATED ORAL at 16:26

## 2018-03-29 RX ADMIN — AMIODARONE HYDROCHLORIDE 200 MG: 200 TABLET ORAL at 06:13

## 2018-03-29 RX ADMIN — ONDANSETRON 4 MG: 2 INJECTION INTRAMUSCULAR; INTRAVENOUS at 15:32

## 2018-03-29 RX ADMIN — OXYCODONE HYDROCHLORIDE AND ACETAMINOPHEN 1 TABLET: 5; 325 TABLET ORAL at 03:57

## 2018-03-29 RX ADMIN — MUPIROCIN 1 APPLICATION: 20 OINTMENT TOPICAL at 08:12

## 2018-03-29 RX ADMIN — ALBUMIN HUMAN 12.5 G: 0.05 INJECTION, SOLUTION INTRAVENOUS at 02:05

## 2018-03-29 RX ADMIN — ASPIRIN 325 MG: 325 TABLET ORAL at 08:12

## 2018-03-29 RX ADMIN — ALBUMIN HUMAN 25 G: 0.05 INJECTION, SOLUTION INTRAVENOUS at 15:31

## 2018-03-29 RX ADMIN — AMIODARONE HYDROCHLORIDE 200 MG: 200 TABLET ORAL at 14:50

## 2018-03-29 RX ADMIN — PANTOPRAZOLE SODIUM 40 MG: 40 TABLET, DELAYED RELEASE ORAL at 06:13

## 2018-03-29 RX ADMIN — OXYCODONE HYDROCHLORIDE AND ACETAMINOPHEN 1 TABLET: 5; 325 TABLET ORAL at 12:06

## 2018-03-29 RX ADMIN — PHENYLEPHRINE HYDROCHLORIDE 50 MCG/MIN: 10 INJECTION INTRAVENOUS at 22:26

## 2018-03-29 RX ADMIN — DOCUSATE SODIUM 100 MG: 100 CAPSULE, LIQUID FILLED ORAL at 17:36

## 2018-03-29 RX ADMIN — MIDODRINE HYDROCHLORIDE 5 MG: 5 TABLET ORAL at 18:28

## 2018-03-29 RX ADMIN — COLLAGENASE SANTYL: 250 OINTMENT TOPICAL at 17:36

## 2018-03-29 RX ADMIN — AMIODARONE HYDROCHLORIDE 200 MG: 200 TABLET ORAL at 21:39

## 2018-03-29 RX ADMIN — POLYETHYLENE GLYCOL 3350 17 G: 17 POWDER, FOR SOLUTION ORAL at 08:12

## 2018-03-29 RX ADMIN — DOCUSATE SODIUM 100 MG: 100 CAPSULE, LIQUID FILLED ORAL at 08:12

## 2018-03-29 RX ADMIN — MUPIROCIN 1 APPLICATION: 20 OINTMENT TOPICAL at 21:39

## 2018-03-29 NOTE — PROGRESS NOTES
Progress Note - Podiatry  Ramin Estrella 61 y o  male MRN: [id number]  Unit/Bed#: [facility] 416-01 Encounter: [id number]    Assessment/Plan:  1   Left foot plantar heel eschar with resolving cellulitis secondary to diabetes mellitus with neuropathy, s/p left heel excisional debridement at bedside (Date of procedure: 3/18/18) by Dr Clay Lerner   2  Left fourth digit DTI  3  DM type 2 with neuropathy-A1c: 9 4%  4  Peripheral arterial disease     Plan:  -left heel is stable with no crepitus, no fluctuance, no acute signs of infection:  Left heel dressed with  DSD  -ordered santyl  -santal, DSD to be applied daily  -DTI distal tip left 4th digit painted with betadine   -podiatry to continue to follow while patient in-house for monitoring and dressing changes   -prior radiograph left foot negative for osteomyelitis  -s/p agram with unsuccessful attempt to cross chronic occlusion left popliteal artery crossing knee joint ; 3 vessel runoff  Vascular recommending left femoral-BKA popliteal bypass after CABG is performed   -patient to continue with ceftriaxone/Flagyl per Internal Medicine  -c/w prevalon boots to B/L lower extremity as well to prevent developing pressure wounds to this extremity; patient understanding of such; PT/OT/CM following for discharge planning   - WBS: NWB LLE however may be partial weight-bearing heel offloading shoe when required to do walks   -medical management per primary team    Subjective/Objective   Chief Complaint: No chief complaint on file  Subjective: 61 y o  y/o male was seen and evaluated at bedside  Blood pressure 117/64, pulse 70, temperature 99 °F (37 2 °C), temperature source Probe, resp  rate 15, height 6' 1" (1 854 m), weight 116 kg (256 lb 9 9 oz), SpO2 94 %  ,Body mass index is 33 86 kg/m²      Invasive Devices     Central Venous Catheter Line            CVC Central Lines 03/28/18 Triple 1 day          Peripheral Intravenous Line            Peripheral IV 03/25/18 Left Hand 3 days    Peripheral IV 03/28/18 Right Antecubital 1 day          Arterial Line            Arterial Line 03/28/18 Right Radial 1 day          Line            Pacer Wires 1 day    Pacer Wires 1 day          Drain            Chest Tube 1 Anterior Mediastinal 32 Fr  1 day    Chest Tube 2 Left Pleural 32 Fr  1 day    Chest Tube 3 Posterior Mediastinal 32 Fr  1 day    Urethral Catheter Temperature probe 16 Fr  1 day                Physical Exam:   General: Alert, cooperative and no distress  Lungs: Non labored breathing  Heart: Positive S1, S2  Abdomen: Soft, non-tender  Extremity:         Neurovascular status and gross motor function at baseline  Left heel wound continues to be stable without clinical signs of infection, there is no erythema no calvin pus no ascending cellulitis  Lab, Imaging and other studies:   CBC:   Lab Results   Component Value Date    WBC 11 02 (H) 03/29/2018    HGB 9 6 (L) 03/29/2018    HCT 30 6 (L) 03/29/2018    MCV 88 03/29/2018     03/29/2018    MCH 27 5 03/29/2018    MCHC 31 4 03/29/2018    RDW 14 4 03/29/2018    MPV 9 9 03/29/2018       Imaging: I have personally reviewed pertinent films in PACS  EKG, Pathology, and Other Studies: I have personally reviewed pertinent reports

## 2018-03-29 NOTE — PROGRESS NOTES
Progress Note - Cardiothoracic Surgery   Russel Amin 61 y o  male MRN: [id number]  Unit/Bed#: [facility] 416-01 Encounter: [id number]      POD # 1 s/p CABG    Pt seen/examined  Interval history and data reviewed with critical care team   Pt doing well  No specific complaints  [place] gtt [address]        Medications:   Scheduled Meds:  Current Facility-Administered Medications:  acetaminophen 650 mg Rectal Q4H PRN Shay Davis PA-C    acetaminophen 650 mg Oral Q4H PRN Lizbeth Messina PA-C    amiodarone 200 mg Oral Q8H [address] Shay Davis PA-C    aspirin 325 mg Oral Daily Shay Davis PA-C    atorvastatin 80 mg Oral Daily With NUNO Singh    bisacodyl 10 mg Rectal Daily PRN Shay Davis PA-C    calcium chloride 1 g Intravenous Once Lizbeth Messina PA-C    cefTRIAXone 1,000 mg Intravenous Q24H Maru Salazar MD Last Rate: 1,000 mg (03/28/18 1832)   chlorhexidine 15 mL Mouth/Throat BID Shay Davis PA-C    epinephrine 1-20 mcg/min Intravenous Titrated Josephine Larsen PA-C Last Rate: Stopped (03/28/18 1445)   fondaparinux 2 5 mg Subcutaneous Daily Shay Davis PA-C    furosemide 40 mg Intravenous Q6H PRN Shay Davis PA-C    HYDROmorphone 0 5 mg Intravenous Q1H PRN Shay Davis PA-C    HYDROmorphone 1 mg Intravenous Q1H PRN Shay Davis PA-C    insulin regular (HumuLIN R,NovoLIN R) infusion 0 3-21 Units/hr Intravenous Titrated Shay Davis PA-C Last Rate: Stopped (03/29/18 0001)   lactated ringers 500 mL Intravenous Q30 Min PRN Kyle Davis PA-C Last Rate: Stopped (03/28/18 1730)   lidocaine (cardiac) 100 mg Intravenous Q30 Min PRN Shay Davis PA-C    mupirocin 1 application Nasal C89R [address] Shay Davis PA-C    niCARdipine 2 5-15 mg/hr Intravenous Titrated Kyle Davis PA-C Last Rate: Stopped (03/28/18 1941)   ondansetron 4 mg Intravenous Q6H PRN Shay Davis PA-C    oxyCODONE-acetaminophen 1 tablet Oral Q4H PRN Kyle Quick NUNO Davis    oxyCODONE-acetaminophen 2 tablet Oral Q6H PRN Shay Davis PA-C    pantoprazole 40 mg Oral Early Morning Shay Davis PA-C    phenylephine  mcg/min Intravenous Titrated Claire Layne PA-C Last Rate: 60 mcg/min (03/29/18 0600)   polyethylene glycol 17 g Oral Daily Shay Davis PA-C    potassium chloride 20 mEq Intravenous Once PRN Shay Davis PA-C    potassium chloride 20 mEq Intravenous Q1H PRN Víctor Nicholson PA-C Last Rate: Stopped (03/28/18 1530)   potassium chloride 20 mEq Intravenous Q30 Min PRN Shay Davis PA-C    sodium chloride 20 mL/hr Intravenous Continuous Shay Davis PA-C Last Rate: 20 mL/hr (03/29/18 0400)     Continuous Infusions:  epinephrine 1-20 mcg/min Last Rate: Stopped (03/28/18 1445)   insulin regular (HumuLIN R,NovoLIN R) infusion 0 3-21 Units/hr Last Rate: Stopped (03/29/18 0001)   niCARdipine 2 5-15 mg/hr Last Rate: Stopped (03/28/18 1941)   phenylephine  mcg/min Last Rate: 60 mcg/min (03/29/18 0600)   sodium chloride 20 mL/hr Last Rate: 20 mL/hr (03/29/18 0400)     PRN Meds:   acetaminophen    acetaminophen    bisacodyl    furosemide    HYDROmorphone    HYDROmorphone    lactated ringers    lidocaine (cardiac)    ondansetron    oxyCODONE-acetaminophen    oxyCODONE-acetaminophen    potassium chloride    potassium chloride    potassium chloride    Vitals: Blood pressure 97/59, pulse 68, temperature 99 °F (37 2 °C), temperature source Probe, resp  rate 18, height 6' 1" (1 854 m), weight 116 kg (256 lb 9 9 oz), SpO2 97 %  ,Body mass index is 33 86 kg/m²  I/O last 24 hours: In: 5078 2 [I V :2713  2; NG/GT:40; IV Piggyback:1650]  Out: 3360 [Urine:2045; Blood:675; Chest Tube:640]  Invasive Devices     Central Venous Catheter Line            CVC Central Lines 03/28/18 Triple less than 1 day          Peripheral Intravenous Line            Peripheral IV 03/25/18 Left Hand 3 days    Peripheral IV 03/28/18 Right Antecubital less than 1 day          Arterial Line            Arterial Line 03/28/18 Right Radial less than 1 day          Line            Pacer Wires less than 1 day    Pacer Wires less than 1 day          Drain            Chest Tube 1 Anterior Mediastinal 32 Fr  less than 1 day    Chest Tube 2 Left Pleural 32 Fr  less than 1 day    Chest Tube 3 Posterior Mediastinal 32 Fr  less than 1 day    Urethral Catheter Temperature probe 16 Fr  less than 1 day                  Lab, Imaging and other studies:     Results from last 7 days  Lab Units 03/29/18  0402 03/28/18 2125 03/28/18  1422 03/28/18  1343  03/28/18  1218  03/27/18  0524 03/26/18  0431   WBC Thousand/uL 11 02*  --   --   --   --   --   --  10 96* 9 87   HEMOGLOBIN g/dL 9 6* 10 0*  --  10 3*  --   --   --  11 1* 11 8*   I STAT HEMOGLOBIN g/dl  --   --  10 2*  --   < >  --   < >  --   --    HEMATOCRIT % 30 6* 30 5*  --  32 8*  --   --   --  34 6* 36 0*   PLATELETS Thousands/uL 194  --   --  214  --  273  --  273 286   < > = values in this interval not displayed  Results from last 7 days  Lab Units 03/29/18  0402 03/28/18 2125 03/28/18  1737  03/28/18  1343  03/27/18  0524   SODIUM mmol/L 139  --   --   --  138  --  133*   POTASSIUM mmol/L 5 0 5 2 5 2  --  3 4*  --  3 9   CHLORIDE mmol/L 106  --   --   --  104  --  98*   CO2 mmol/L 27  --   --   --  24  --  27   BUN mg/dL 12  --   --   --  13  --  17   CREATININE mg/dL 0 69  --   --   --  0 85  --  0 99   GLUCOSE RANDOM mg/dL 122  --   --   --  168*  --  143*   GLUCOSE, ISTAT   --   --   --   < >  --   < >  --    CALCIUM mg/dL 7 6*  --   --   --  8 1*  --  8 4   < > = values in this interval not displayed  Results from last 7 days  Lab Units 03/25/18  0544   INR  1 06   PTT seconds 29     No results for input(s): PHART, LER6WCR, PO2ART, DYL7PPX, K1UIIOKU, BEART in the last 72 hours  Plan:    Wean [place] gtt to off  DC [place]  Keep Stout for hx of urinary retention    Start Flomax  Continue chest tubes, pacing wires  Transfer to floor  PT/OT  Non-weight bearing LLE - this means wheelchair only in setting of sternotomy  Incentive spirometry  Gentle Diuresis  PO ASA/Statin/B blocker        Celia Rick MD  DATE: March 29, 2018  TIME: 7:46 AM

## 2018-03-29 NOTE — OCCUPATIONAL THERAPY NOTE
[address] Evaluation     Patient Name: Goldie Moreno  Today's Date: 3/29/2018  Problem List  Patient Active Problem List   Diagnosis    Anxiety and depression    Benign essential HTN    Diabetic neuropathy, type II diabetes mellitus ([facility]    Hyperlipidemia    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 ([facility]    Vitamin D deficiency    PAD (peripheral artery disease) ([facility]    Non-healing wound of left heel    Orthostatic hypotension    Diabetic gastroparesis (HCC)    Bilateral lower extremity edema    Class 2 obesity due to excess calories with serious comorbidity and body mass index (BMI) of 35 0 to 35 9 in adult    Triple vessel coronary artery disease    Acute on chronic diastolic heart failure (HCC)    Other constipation    S/P CABG x 3    Acute respiratory insufficiency, postoperative     Past Medical History  Past Medical History:   Diagnosis Date    Anemia     Anxiety and depression     Autonomic neuropathy     Cataract     Diabetes mellitus ([facility]     Diabetic autonomic neuropathy associated with type 2 diabetes mellitus ([facility] )     Last Assessed: 12/28/2017    Gastroparesis due to DM ([facility] )     History of DVT (deep vein thrombosis)     left LE    HTN (hypertension)     Hyperlipidemia     Non healing left heel wound     Obesity     Orthostatic hypotension      Past Surgical History  Past Surgical History:   Procedure Laterality Date    [place] CABG, ARTERY-VEIN, FOUR N/A 3/28/2018    Procedure: CORONARY ARTERY BYPASS GRAFT (CABG) x3 VESSELS, LIMA TO LAD, SVG--> PDA, SVG--> OM2,  RIGHT LEG EVH/SVH TO , INTRA-OP AMANDEEP;  Surgeon: Kalani Young MD;  [facility];  Service: Cardiac Surgery    ROTATOR CUFF REPAIR Bilateral          03/29/18 1529   Note Type   Note type Eval/Treat   Restrictions/Precautions   Weight Bearing Precautions Per Order Yes   LLE Weight Bearing Per Order PWB  (PWB IN GLOBOPED W/ THERAPY)   Braces or Orthoses (globoped)   Other Precautions WBS;Cardiac/sternal;Multiple lines;Telemetry; Fall Risk;Pain  (epicardial wires, A-line, chest tube, jacobs, IV fluids)   Pain Assessment   Pain Assessment 0-10   Pain Score 3   Pain Type Acute pain;Surgical pain   Pain Location Chest   Pain Orientation Mid   Pain Descriptors Discomfort   Hospital Pain Intervention(s) Ambulation/increased activity;Repositioned   Response to Interventions tolerated   Home Living   Type of [address] Multi-level;Stairs to enter without rails   Bathroom Shower/Tub Tub/shower unit   Bathroom Toilet Standard   Bathroom Accessibility Accessible   Home Equipment Walker;Cane   Additional Comments Pt lives with his wife in a 2 story town home w/ 4 VITOR  Prior Function   Level of [place] Independent with ADLs and functional mobility   Lives With Spouse   Receives Help From Family   ADL Assistance Independent   IADLs Needs assistance   Falls in the last 6 months 0   Vocational On disability   Comments Pt was I w/ ADLS but was struggling to complete and had some A for IADLS PTA  Lifestyle   Autonomy Pt reports he was I w/ ADLS PTA however was struggling to complete at home   PT's wife performs IADLS as able   Reciprocal Relationships Pt lives with his wife who works 61 + hours a week and he reports she is only able to provide minimal assistance    Service to Others Pt is on disability    Intrinsic Gratification Pt does not report enjoyable activities    Psychosocial   Psychosocial (WDL) WDL   Patient Behaviors/Mood Anxious   Subjective   Subjective "We are barely making ends meet, my wife does all that she can but it's too expensive for some of these medications"   ADL   Eating Assistance 5  Supervision/Setup   Grooming Assistance 4  Minimal Assistance   UB Bathing Assistance 3  Moderate Assistance   LB [address] 2  [address] 3  Moderate Assistance    [facility] 1  Total Assistance Toileting Assistance  1  Total Assistance   Toileting Deficit (jacobs)   Bed Mobility   Supine to Sit Unable to assess   Sit to Supine Unable to assess   Additional Comments Pt OOB upon arrival and return to chair at end of therapy session    Transfers   Sit to Stand 3  Moderate assistance   Additional items Assist x 2; Increased time required;Verbal cues   Stand to Sit 3  Moderate assistance   Additional items Assist x 2; Increased time required;Verbal cues   Additional Comments Pt performs functional transfers w/ mod A x2 for force production, steadying/balance, verbal cues for precautions and sequencing technique, increased time to complete and line management  Functional Mobility   Functional Mobility 3  Moderate assistance   Additional Comments Pt performs functional mobility using rw for ~ 4 feet w/ mod A x2 for steadying/balance, verbal cues, increased time to complete and cues to sequence/attend and maintain precautions  3rd and 4th person present for line management and assist when pt became orthostatic requiring significantly increased A for safety and management of hypotension  Additional items Rolling walker   Balance   Static Sitting Fair +   Static Standing Poor   Ambulatory Poor   Activity Tolerance   Activity Tolerance Patient limited by fatigue;Patient limited by pain;Treatment limited secondary to medical complications (Comment)  (ORTHOSTATIC HYPOTENSION-RN PRESENT )   Medical Staff Made Aware PT [id number]   Nurse Made Aware RN CONFIRM PT APPROPRAITE AND PRESENT T/O SESSION    RUE Assessment   RUE Assessment WFL   LUE Assessment   LUE Assessment WFL   Hand Function   Gross Motor Coordination Functional   Fine Motor Coordination Functional   Sensation   Light Touch Severe deficits in the RLE; Severe deficits in the LLE   Sharp/Dull Severe deficits in the RLE; Severe deficits in the LLE   Cognition   Overall Cognitive Status [facility]   Arousal/Participation Alert; Responsive; Cooperative   Attention Within functional limits   Memory Within functional limits   Following Commands Follows one step commands with increased time or repetition   Comments Pt is alert and cooperative t/o session  Pt mildly distractible and tangential regarding fiances and home situation, however able to be directed  Feel pt may benefit from neuropsych consult for psychotherapy and coping strategies  Assessment   Limitation Decreased ADL status; Decreased endurance;Decreased sensation;Decreased self-care trans;Decreased high-level ADLs   Prognosis Fair   Assessment Pt is a 60 y/o male seen for OT eval s/p adm to [facility] as transfer from [facility] w/ with abnormal cardiac catheterization showing triple-vessel disease for cardiothoracic evaluation  Pt dx'd w/ Multivessel coronary artery disease and s/p Acute NSTEMI receiving Coronary artery bypass grafting x 3 on 3/28/18  Comorbidities include L foot plantar heel wound, PAD, and a h/o anemia, autonomic neuropathy, cataract, DVT, HTN, HLD, obesity, DM, and orthostatic hypotension  Pt with active OT orders, PWB LLE for therapy only (otherwise NWB) and up with assistance orders  Pt lives with his wife in a 2 story town home w/ 4 VITOR  Pt was I w/ ADLS but was struggling to complete and had some A for IADLS PTA  Pt reports his wife works 60+ hours per week and is only able to provide minimal assistance  Pt reports using rw PTA w/ occasional use of SPC  Pt is demonstrating the following occupational deficits: mod A UB ADLS, total A LB ADLS, mod A x2 functional transfers and mod A x2 functional mobility using rw for short in room distance (~4 feet)   Pt with deficits and limitations in all baseline areas of occupation 2* orthostatic hypotension, dizziness, nausea, pain, cardiac/sternal precautions, decreased endurance/activity tolerance, decreased functional forward reach, decreased ADL status, impaired balance, decreased mobility status, anxiety, WBS, deconditioning, generalized weakness, inaccessible home environment, and decreased caregiver support  The following Occupational Performance Areas to address include: bathing/shower, toilet hygiene, dressing, health maintenance, functional mobility, community mobility, clothing management, meal prep and household maintenance  Pt scored overall 25/100 on the Barthel Index  Based on the aforementioned OT evaluation, functional performance deficits, and assessments, pt has been identified as a high complexity evaluation  Recommend inpatient rehab upon D/C  Pt to continue to benefit from acute immediate OT services to address the following goals 3-5x/wk to  w/in 10-14 days:   Goals   Patient Goals none expressed   Plan   Treatment Interventions ADL retraining;Functional transfer training; Endurance training;Cognitive reorientation;Patient/family training;Equipment evaluation/education; Compensatory technique education;Continued evaluation; Energy conservation; Activityengagement   Goal Expiration Date 18   OT Frequency 3-5x/wk   Recommendation   Recommendation [facility] FOR PSYCHOTHERAPY AND COPING SKILLS)   OT Discharge Recommendation Short Term Rehab   OT - OK to Discharge Yes  (to STR when medically stable)   Barthel Index   Feeding 10   Bathing 0   Grooming Score 5   Dressing Score 0   Bladder Score 0   Bowels Score 5   Toilet Use Score 0   Transfers (Bed/Chair) Score 5   Mobility (Level Surface) Score 0   Stairs Score 0   Barthel Index Score 25   Modified [place] Scale   Modified [place] Scale 4         GOALS:    1) Pt will improve activity tolerance to G for min 30 min txment sessions  2) Pt will complete ADLs/self care w/ min A using adaptive device and DME as needed  3) Pt will complete toileting w/ min A w/ G hygiene/thoroughness using DME as needed  4) Pt will improve functional transfers on/off all surfaces using DME as needed w/ G balance/safety including w/ min A  5) Pt will improve functional mobility during ADL/IADL/leisure tasks using DME as needed w/ G balance/safety w/ min A  6) Pt will demonstrate G carryover of pt/caregiver education and training as appropriate w/ mod I w/o cues w/ good tolerance  7) Pt will demonstrate 100% carryover of energy conservation techniques w/ mod I t/o functional I/ADL/leisure tasks w/o cues s/p skilled education  8) Pt will engage in cardiac education using the Recovering After Cardiac Rehabilitation packet w/ G participation and G carryover    9) Pt will demonstrate 100% carryover of precautions s/p review w/o cues w/ mod I w/ G tolerance/participation t/o functional ADL/IADL/leisure tasks  10) Pt will identify symptoms of orthostatic hypotension and report them to therapist 100% of the time as well as identify 3 management strategies  11) Pt will engage in depression screen/leisure interest checklist w/ mod I and G participation to monitor s/s depression and ID 3 positive coping strategies to A w/ emotional regulation and management        Fernando Maldonado MS, OTR/L

## 2018-03-29 NOTE — PHYSICAL THERAPY NOTE
Physical Therapy Evaluation     Patient's Name: Td Addison    Admitting Diagnosis  CAD (coronary artery disease) [I25 10]    Problem List  Patient Active Problem List   Diagnosis    Anxiety and depression    Benign essential HTN    Diabetic neuropathy, type II diabetes mellitus [facility]    Hyperlipidemia    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 ([facility]    Vitamin D deficiency    PAD (peripheral artery disease) ([facility]    Non-healing wound of left heel    Diabetic gastroparesis ([facility]    Bilateral lower extremity edema    Class 2 obesity due to excess calories with serious comorbidity and body mass index (BMI) of 35 0 to 35 9 in adult    Triple vessel coronary artery disease    Acute on chronic diastolic heart failure (HCC)    Other constipation    S/P CABG x 3    Acute respiratory insufficiency, postoperative    Diabetic autonomic neuropathy associated with type 2 diabetes mellitus ([facility] )       Past Medical History  Past Medical History:   Diagnosis Date    Anemia     Anxiety and depression     Autonomic neuropathy     Cataract     Diabetes mellitus ([facility]     Diabetic autonomic neuropathy associated with type 2 diabetes mellitus ([facility] )     Last Assessed: 12/28/2017    Gastroparesis due to DM ([facility] )     History of DVT (deep vein thrombosis)     left LE    HTN (hypertension)     Hyperlipidemia     Non healing left heel wound     Obesity     Orthostatic hypotension        Past Surgical History  Past Surgical History:   Procedure Laterality Date    [place] CABG, ARTERY-VEIN, FOUR N/A 3/28/2018    Procedure: CORONARY ARTERY BYPASS GRAFT (CABG) x3 VESSELS, LIMA TO LAD, SVG--> PDA, SVG--> OM2,  RIGHT LEG EVH/SVH TO , INTRA-OP AMANDEEP;  Surgeon: Giles Ramos MD;  [facility];  Service: Cardiac Surgery    ROTATOR CUFF REPAIR Bilateral           03/29/18 6150   Note Type   Note type Re-eval   Pain Assessment   Pain Assessment FLACC   Pain Type Acute pain;Surgical pain   Pain Location Incision   Pain Orientation Mid   Pain Descriptors Aching;Discomfort   Pain Frequency Intermittent   Pain Onset Ongoing   Clinical Progression Not changed   Effect of Pain on Daily Activities discomfort w/ positional changes   Patient's Stated Pain Goal No pain   Hospital Pain Intervention(s) Medication (See MAR); Repositioned; Ambulation/increased activity; Distraction; Emotional support   Response to Interventions no increased discomfort post session   Pain Rating: FLACC (Rest) - Face 0   Pain Rating: FLACC (Rest) - Legs 0   Pain Rating: FLACC (Rest) - Activity 0   Pain Rating: FLACC (Rest) - Cry 1   Pain Rating: FLACC (Rest) - Consolability 0   Score: FLACC (Rest) 1   Pain Rating: FLACC (Activity) - Face 1   Pain Rating: FLACC (Activity) - Legs 0   Pain Rating: FLACC (Activity) - Activity 0   Pain Rating: FLACC (Activity) - Cry 1   Pain Rating: FLACC (Activity) - Consolability 1   Score: FLACC (Activity) 3   Home Living   Type of Home House   Home Layout Multi-level; Able to live on main level with bedroom/bathroom  (4 VITOR w/ no hand rail; 10+10 steps to the 2nd floor)   Home Equipment Walker;Cane   Prior Function   Level of [place] Independent with ADLs and functional mobility  (amb w/ rw)   Lives With Spouse   Receives Help From Family   Vocational On disability   Comments PWB for mobilization/amb during therapy (as well as nsg) w/ Globoped/heel unloading shoe on; otherwise, maintain NWB as much as possible (per clarification/communication with Dr Bryce Allen, RANDELL)   Restrictions/Precautions   Weight Bearing Precautions Per Order Yes   LLE Weight Bearing Per Order PWB  (in heel unloading shoe for therapy only; otherwise, NWB)   Braces or Orthoses Other (Comment)  (Globoped shoe (L) LE)   Other Precautions Cardiac/sternal;Multiple lines;Telemetry;O2;Fall Risk;Pain  (CT in place; a-line;)   General   Additional Pertinent History cleared for assessment (spoke to nsg)   Cognition Overall Cognitive Status [facility]   Arousal/Participation Alert   Attention Attends with cues to redirect   Orientation Level Oriented to person;Oriented to place;Oriented to situation   Memory Decreased recall of precautions   Following Commands Follows one step commands without difficulty   Comments Pt is observed reclined in the chair; agreeable to try mobilization   RUE Assessment   RUE Assessment WFL  (AROM w/in sternal precautions)   LUE Assessment   LUE Assessment WFL  (AROM w/in sternal precautions)   RLE Assessment   RLE Assessment (decreased AROM at the hip)   Strength RLE   RLE Overall Strength (fair - hip; fair + knee; fair ankle (grossly))   LLE Assessment   LLE Assessment WFL  (AROM)   Strength LLE   LLE Overall Strength (fair)   Transfers   Sit to Stand 3  Moderate assistance  (use of chair pad to facilitate lift off )   Additional items Assist x 2;Verbal cues   Stand to Sit 3  Moderate assistance   Additional items Assist x 2;Verbal cues   Additional Comments Pt remained reclined in the chair w/ LE elevated at the end of session; pillow placed for LE support; (L) Medline boot is on;   Ambulation/Elevation   Gait pattern Excessively slow; Step to;Short stride;Decreased L stance   Gait Assistance 3  Moderate assist   Additional items Assist x 2;Verbal cues; Tactile cues  (stand by (A) of 1-2 more staff for lines and chair managemen)   Assistive Device Rolling walker   Distance 4 ft total distance at bedside; Further amb was not performed due to decreased endurance w/ episode of somewhat decreased responses while standing; ? onset of lightheadedness; pt was (A)'d back to chair and reclined; initial BP was 64/40; pt had a brief episode of abd discomfort and ? nausea w/ some dry heaves but no vomiting; symptoms gradually subsided; nsg staff present and vital signs were monitored   BP at the end of session was 110/57; O2 sat in the 90s %   Balance   Static Sitting Fair   Static Standing Poor   Ambulatory Poor - Activity Tolerance   Activity Tolerance Patient limited by fatigue;Treatment limited secondary to medical complications (Comment)   Nurse Made Aware spoke to YI VARGAS, YI Vu and Christy Martinez RN   Assessment   Prognosis Fair   Problem List Decreased strength;Decreased endurance; Impaired balance;Decreased mobility;Obesity;Pain;Orthopedic restrictions   Assessment Functional mobility re-assessment performed; pt was initially admitted w/ hx of abnormal cardiac catheterization showing triple-vessel disease; also w/ Dx of left foot plantar heel unstageable eschar with associated cellulitis 2/2 DM with neuropathy and 4th digit DTI; pt was seen in PT for initial evaluation on 3/25/2018; during the course, pt underwent CABG x 3 on 3/28/2018; PT is re-consulted; pt is cleared for assessment/mobilization (incl PWB on (L) LE for mobilization in heel offloading shoe as per podiatry); pt currently exhibits postop discomfort and guarding, general mod weakness and deconditioning, decreased functional endurance and tolerance to mobilization (incl during positional changes), and fall risk  Mod (A) x2 was required w/ all aspects of limited mobility at bedside; will cont to follow pt in PT for graded mobilization as clinically appropriate; recommend rehab upon D/C when medically cleared;   Barriers to Discharge Inaccessible home environment;Decreased caregiver support   Goals   Patient Goals to get better   LTG Expiration Date 04/12/18   Long Term Goal #1 10-14 days  Pt will amb 2 x 10 ft w/ rw, min (A)x1 and chair follow while adhering to appropriate WB status on (L) LE and maintaining sternal precautions  Pt will achieve (S) level w/ bed mob  Pt will perform transfers w/ min (A)x1  Plan   Treatment/Interventions Functional transfer training;LE strengthening/ROM; Therapeutic exercise; Endurance training;Bed mobility;Gait training; Compensatory technique education;Spoke to nursing;OT  (spoke to [facility])   PT Frequency 5x/wk  (will maintain amb distances to limited household level)   Recommendation   Recommendation Post acute IP rehab   Modified [place] Scale   Modified [place] Scale 4   Barthel Index   Feeding 10   Bathing 0   Grooming Score 5   Dressing Score 0   Bladder Score 0   Bowels Score 10   Toilet Use Score 0   Transfers (Bed/Chair) Score 5   Mobility (Level Surface) Score 0   Stairs Score 0   Barthel Index Score 30       Yelena Hall, PT

## 2018-03-29 NOTE — PLAN OF CARE
Problem: OCCUPATIONAL THERAPY ADULT  Goal: Performs self-care activities at highest level of function for planned discharge setting  See evaluation for individualized goals  Treatment Interventions: ADL retraining, Functional transfer training, Endurance training, Cognitive reorientation, Patient/family training, Equipment evaluation/education, Compensatory technique education, Continued evaluation, Energy conservation, Activityengagement          See flowsheet documentation for full assessment, interventions and recommendations  Limitation: Decreased ADL status, Decreased endurance, Decreased sensation, Decreased self-care trans, Decreased high-level ADLs  Prognosis: Fair  Assessment: Pt is a 60 y/o male seen for OT eval s/p adm to [facility] as transfer from [facility] w/ with abnormal cardiac catheterization showing triple-vessel disease for cardiothoracic evaluation  Pt dx'd w/ Multivessel coronary artery disease and s/p Acute NSTEMI receiving Coronary artery bypass grafting x 3 on 3/28/18  Comorbidities include L foot plantar heel wound, PAD, and a h/o anemia, autonomic neuropathy, cataract, DVT, HTN, HLD, obesity, DM, and orthostatic hypotension  Pt with active OT orders, PWB LLE for therapy only (otherwise NWB) and up with assistance orders  Pt lives with his wife in a 2 story town home w/ 4 VITOR  Pt was I w/ ADLS but was struggling to complete and had some A for IADLS PTA  Pt reports his wife works 60+ hours per week and is only able to provide minimal assistance  Pt reports using rw PTA w/ occasional use of SPC  Pt is demonstrating the following occupational deficits: mod A UB ADLS, total A LB ADLS, mod A x2 functional transfers and mod A x2 functional mobility using rw for short in room distance (~4 feet)   Pt with deficits and limitations in all baseline areas of occupation 2* orthostatic hypotension, dizziness, nausea, pain, cardiac/sternal precautions, decreased endurance/activity tolerance, decreased functional forward reach, decreased ADL status, impaired balance, decreased mobility status, anxiety, WBS, deconditioning, generalized weakness, inaccessible home environment, and decreased caregiver support  The following Occupational Performance Areas to address include: bathing/shower, toilet hygiene, dressing, health maintenance, functional mobility, community mobility, clothing management, meal prep and household maintenance  Pt scored overall 25/100 on the Barthel Index  Based on the aforementioned OT evaluation, functional performance deficits, and assessments, pt has been identified as a high complexity evaluation  Recommend inpatient rehab upon D/C   Pt to continue to benefit from acute immediate OT services to address the following goals 3-5x/wk to  w/in 10-14 days:     OT Discharge Recommendation: Short Term Rehab  OT - OK to Discharge: Yes (to STR when medically stable)        Albina Hong MS, OTR/L

## 2018-03-29 NOTE — PLAN OF CARE
Problem: PHYSICAL THERAPY ADULT  Goal: Performs mobility at highest level of function for planned discharge setting  See evaluation for individualized goals  Treatment/Interventions: Functional transfer training, LE strengthening/ROM, Therapeutic exercise, Endurance training, Patient/family training, Gait training, Bed mobility, Equipment eval/education  Equipment Recommended: Wheelchair, Mirtha Narrow       See flowsheet documentation for full assessment, interventions and recommendations  Prognosis: Fair  Problem List: Decreased strength, Decreased endurance, Impaired balance, Decreased mobility, Obesity, Pain, Orthopedic restrictions  Assessment: Functional mobility re-assessment performed; pt was initially admitted w/ hx of abnormal cardiac catheterization showing triple-vessel disease; also w/ Dx of left foot plantar heel unstageable eschar with associated cellulitis 2/2 DM with neuropathy and 4th digit DTI; pt was seen in PT for initial evaluation on 3/25/2018; during the course, pt underwent CABG x 3 on 3/28/2018; PT is re-consulted; pt is cleared for assessment/mobilization (incl PWB on (L) LE for mobilization in heel offloading shoe as per podiatry); pt currently exhibits postop discomfort and guarding, general mod weakness and deconditioning, decreased functional endurance and tolerance to mobilization (incl during positional changes), and fall risk  Mod (A) x2 was required w/ all aspects of limited mobility at bedside; will cont to follow pt in PT for graded mobilization as clinically appropriate; recommend rehab upon D/C when medically cleared;  Barriers to Discharge: Inaccessible home environment, Decreased caregiver support     Recommendation: Post acute IP rehab     PT - OK to Discharge:  (to rehab once medically appropriate)    See flowsheet documentation for full assessment

## 2018-03-29 NOTE — ORTHOTIC NOTE
Orthotic Note            Date: 3/29/2018      Patient Name: Jefferson Castro        Time: 12:50pm    Reason for Consult:  Patient Active Problem List   Diagnosis    Anxiety and depression    Benign essential HTN    Diabetic neuropathy, type II diabetes mellitus ([facility] )    Hyperlipidemia    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 ([facility] )    Vitamin D deficiency    PAD (peripheral artery disease) ([place] [id number] )    Non-healing wound of left heel    Orthostatic hypotension    Diabetic gastroparesis (HCC)    Bilateral lower extremity edema    Class 2 obesity due to excess calories with serious comorbidity and body mass index (BMI) of 35 0 to 35 9 in adult    Triple vessel coronary artery disease    Acute on chronic diastolic heart failure (HCC)    Other constipation    S/P CABG x 3    Acute respiratory insufficiency, postoperative   XL Globoped Shoe E [facility]#[id number]  Per Podiatry    I measure, fit, and donned XL Globoped Shoe while patient was sitting up in chair at bedside  Patient tolerated well and instructions reviewed at this time  I will continue follow up  Instructions and my contact information at bedside  Recommendations:  Please call Mobility Coordinator at ext  [phone] in regards to bracing instruction and/or adjustment  Charla Velazquez Mobility Coordinator LCFo, LCOF, ASOP R  O T, O B T

## 2018-03-29 NOTE — PROGRESS NOTES
Progress Note - Elmer Tirado 61 y o  male MRN: [id number]    Unit/Bed#: [facility] 416-01 Encounter: [id number]      CC: diabetes f/u    Subjective:   Elmer Tirado is a 61y o  year old male with type 2 diabetes  Hypotension on phenylephrine  Appetite still poor  Objective:     Vitals: Blood pressure (!) 86/51, pulse 70, temperature 99 °F (37 2 °C), temperature source Probe, resp  rate 16, height 6' 1" (1 854 m), weight 116 kg (256 lb 9 9 oz), SpO2 93 %  ,Body mass index is 33 86 kg/m²  Intake/Output Summary (Last 24 hours) at 03/29/18 1650  Last data filed at 03/29/18 1600   Gross per 24 hour   Intake          2358 75 ml   Output             3525 ml   Net         -1166 25 ml       Physical Exam:  General: No acute distress  Alert, awake, and oriented x3  HEENT: Normocephalic, atraumatic  Heart: Regular rate and rhythm  Lungs: Clear  No respiratory distress  Extremities: No edema  Skin: Warm, dry  No rash  Neuro: Moves all 4 extremities spontaneously  Psych: Appropriate mood and affect  Cooperative  Lab, Imaging and other studies: I have personally reviewed pertinent reports  POC Glucose   Date Value   03/29/2018 134 mg/dl   03/29/2018 173 mg/dl (H)   03/29/2018 134 mg/dl   03/29/2018 157 mg/dl (H)   03/29/2018 135 mg/dl   03/29/2018 125 mg/dl   03/29/2018 94 mg/dl   03/28/2018 94 mg/dl   03/28/2018 138 mg/dl   03/28/2018 144 mg/dl (H)   01/11/2018 130   09/07/2016 84       Assessment:  Type 2 diabetes with hyperglycemia and history of autonomic neuropathy  CAD status post CABG  Nonhealing wound of left heel peripheral artery disease    Plan:  Type 2 diabetes with hyperglycemia history of autonomic neuropathy:  Patient is still on phenylephrine and appetite still not consistent  Continue insulin drip for now  Will reassess tomorrow to see if transition to subcutaneous basal bolus insulin is appropriate  Will continue to follow and adjust as needed    Monitor for hypoglycemia and treat protocol  CAD status post CABG:  Postop day 1  Care per primary team     Wound of left heel with peripheral artery disease:  Podiatry and vascular surgery following

## 2018-03-30 LAB
ABO GROUP BLD BPU: NORMAL
ANION GAP SERPL CALCULATED.3IONS-SCNC: 4 MMOL/L (ref 4–13)
BPU ID: NORMAL
BUN SERPL-MCNC: 13 MG/DL (ref 5–25)
CALCIUM SERPL-MCNC: 7.9 MG/DL (ref 8.3–10.1)
CHLORIDE SERPL-SCNC: 100 MMOL/L (ref 100–108)
CO2 SERPL-SCNC: 30 MMOL/L (ref 21–32)
CREAT SERPL-MCNC: 0.7 MG/DL (ref 0.6–1.3)
CROSSMATCH: NORMAL
ERYTHROCYTE [DISTWIDTH] IN BLOOD BY AUTOMATED COUNT: 14.4 % (ref 11.6–15.1)
GFR SERPL CREATININE-BSD FRML MDRD: 104 ML/MIN/1.73SQ M
GLUCOSE SERPL-MCNC: 104 MG/DL (ref 65–140)
GLUCOSE SERPL-MCNC: 107 MG/DL (ref 65–140)
GLUCOSE SERPL-MCNC: 117 MG/DL (ref 65–140)
GLUCOSE SERPL-MCNC: 117 MG/DL (ref 65–140)
GLUCOSE SERPL-MCNC: 118 MG/DL (ref 65–140)
GLUCOSE SERPL-MCNC: 119 MG/DL (ref 65–140)
GLUCOSE SERPL-MCNC: 124 MG/DL (ref 65–140)
GLUCOSE SERPL-MCNC: 128 MG/DL (ref 65–140)
GLUCOSE SERPL-MCNC: 138 MG/DL (ref 65–140)
GLUCOSE SERPL-MCNC: 165 MG/DL (ref 65–140)
GLUCOSE SERPL-MCNC: 181 MG/DL (ref 65–140)
GLUCOSE SERPL-MCNC: 215 MG/DL (ref 65–140)
GLUCOSE SERPL-MCNC: 230 MG/DL (ref 65–140)
HCT VFR BLD AUTO: 26.8 % (ref 36.5–49.3)
HGB BLD-MCNC: 8.7 G/DL (ref 12–17)
MAGNESIUM SERPL-MCNC: 2.2 MG/DL (ref 1.6–2.6)
MCH RBC QN AUTO: 27.7 PG (ref 26.8–34.3)
MCHC RBC AUTO-ENTMCNC: 32.5 G/DL (ref 31.4–37.4)
MCV RBC AUTO: 85 FL (ref 82–98)
PLATELET # BLD AUTO: 178 THOUSANDS/UL (ref 149–390)
PMV BLD AUTO: 9.2 FL (ref 8.9–12.7)
POTASSIUM SERPL-SCNC: 4.3 MMOL/L (ref 3.5–5.3)
RBC # BLD AUTO: 3.14 MILLION/UL (ref 3.88–5.62)
SODIUM SERPL-SCNC: 134 MMOL/L (ref 136–145)
UNIT DISPENSE STATUS: NORMAL
UNIT PRODUCT CODE: NORMAL
UNIT RH: NORMAL
WBC # BLD AUTO: 11.18 THOUSAND/UL (ref 4.31–10.16)

## 2018-03-30 PROCEDURE — 99232 SBSQ HOSP IP/OBS MODERATE 35: CPT | Performed by: INTERNAL MEDICINE

## 2018-03-30 PROCEDURE — 99024 POSTOP FOLLOW-UP VISIT: CPT | Performed by: THORACIC SURGERY (CARDIOTHORACIC VASCULAR SURGERY)

## 2018-03-30 PROCEDURE — 83735 ASSAY OF MAGNESIUM: CPT | Performed by: THORACIC SURGERY (CARDIOTHORACIC VASCULAR SURGERY)

## 2018-03-30 PROCEDURE — 85027 COMPLETE CBC AUTOMATED: CPT | Performed by: THORACIC SURGERY (CARDIOTHORACIC VASCULAR SURGERY)

## 2018-03-30 PROCEDURE — 99233 SBSQ HOSP IP/OBS HIGH 50: CPT | Performed by: EMERGENCY MEDICINE

## 2018-03-30 PROCEDURE — 80048 BASIC METABOLIC PNL TOTAL CA: CPT | Performed by: THORACIC SURGERY (CARDIOTHORACIC VASCULAR SURGERY)

## 2018-03-30 PROCEDURE — 97530 THERAPEUTIC ACTIVITIES: CPT

## 2018-03-30 PROCEDURE — 82948 REAGENT STRIP/BLOOD GLUCOSE: CPT

## 2018-03-30 PROCEDURE — 94760 N-INVAS EAR/PLS OXIMETRY 1: CPT

## 2018-03-30 RX ORDER — MIDODRINE HYDROCHLORIDE 5 MG/1
5 TABLET ORAL ONCE
Status: COMPLETED | OUTPATIENT
Start: 2018-03-30 | End: 2018-03-30

## 2018-03-30 RX ORDER — MIDODRINE HYDROCHLORIDE 5 MG/1
10 TABLET ORAL
Status: DISCONTINUED | OUTPATIENT
Start: 2018-03-30 | End: 2018-04-04 | Stop reason: HOSPADM

## 2018-03-30 RX ADMIN — FONDAPARINUX SODIUM 2.5 MG: 2.5 INJECTION, SOLUTION SUBCUTANEOUS at 08:16

## 2018-03-30 RX ADMIN — PANTOPRAZOLE SODIUM 40 MG: 40 TABLET, DELAYED RELEASE ORAL at 05:41

## 2018-03-30 RX ADMIN — MIDODRINE HYDROCHLORIDE 5 MG: 5 TABLET ORAL at 08:16

## 2018-03-30 RX ADMIN — MUPIROCIN 1 APPLICATION: 20 OINTMENT TOPICAL at 21:54

## 2018-03-30 RX ADMIN — PHENYLEPHRINE HYDROCHLORIDE 50 MCG/MIN: 10 INJECTION INTRAVENOUS at 09:32

## 2018-03-30 RX ADMIN — DOCUSATE SODIUM 100 MG: 100 CAPSULE, LIQUID FILLED ORAL at 17:19

## 2018-03-30 RX ADMIN — MUPIROCIN 1 APPLICATION: 20 OINTMENT TOPICAL at 08:17

## 2018-03-30 RX ADMIN — AMIODARONE HYDROCHLORIDE 200 MG: 200 TABLET ORAL at 21:54

## 2018-03-30 RX ADMIN — ATORVASTATIN CALCIUM 80 MG: 80 TABLET, FILM COATED ORAL at 17:19

## 2018-03-30 RX ADMIN — DOCUSATE SODIUM 100 MG: 100 CAPSULE, LIQUID FILLED ORAL at 08:17

## 2018-03-30 RX ADMIN — FUROSEMIDE 20 MG: 10 INJECTION, SOLUTION INTRAMUSCULAR; INTRAVENOUS at 17:19

## 2018-03-30 RX ADMIN — INSULIN LISPRO 10 UNITS: 100 INJECTION, SOLUTION INTRAVENOUS; SUBCUTANEOUS at 16:30

## 2018-03-30 RX ADMIN — MIDODRINE HYDROCHLORIDE 5 MG: 5 TABLET ORAL at 06:38

## 2018-03-30 RX ADMIN — MIDODRINE HYDROCHLORIDE 10 MG: 5 TABLET ORAL at 17:19

## 2018-03-30 RX ADMIN — AMIODARONE HYDROCHLORIDE 200 MG: 200 TABLET ORAL at 05:41

## 2018-03-30 RX ADMIN — COLLAGENASE SANTYL: 250 OINTMENT TOPICAL at 08:17

## 2018-03-30 RX ADMIN — POTASSIUM CHLORIDE 10 MEQ: 750 TABLET, EXTENDED RELEASE ORAL at 17:19

## 2018-03-30 RX ADMIN — POTASSIUM CHLORIDE 10 MEQ: 750 TABLET, EXTENDED RELEASE ORAL at 08:17

## 2018-03-30 RX ADMIN — AMIODARONE HYDROCHLORIDE 200 MG: 200 TABLET ORAL at 15:19

## 2018-03-30 RX ADMIN — MIDODRINE HYDROCHLORIDE 10 MG: 5 TABLET ORAL at 11:56

## 2018-03-30 RX ADMIN — FUROSEMIDE 20 MG: 10 INJECTION, SOLUTION INTRAMUSCULAR; INTRAVENOUS at 08:16

## 2018-03-30 RX ADMIN — ASPIRIN 325 MG: 325 TABLET ORAL at 08:16

## 2018-03-30 RX ADMIN — TAMSULOSIN HYDROCHLORIDE 0.4 MG: 0.4 CAPSULE ORAL at 17:19

## 2018-03-30 NOTE — PROGRESS NOTES
Progress Note - Cardiothoracic Surgery   Claude Cough 61 y o  male MRN: [id number]  Unit/Bed#: [facility] 416-01 Encounter: [id number]      POD # 2 s/p CABG    Pt seen/examined  Interval history and data reviewed with critical care team   Pt doing well  No specific complaints  [place] gtt 50    BP better now    Medications:   Scheduled Meds:    Current Facility-Administered Medications:  acetaminophen 650 mg Oral Q4H PRN Shay Davis PA-C    amiodarone 200 mg Oral Q8H [address] Shay Davis PA-C    aspirin 325 mg Oral Daily Shay Davis PA-C    atorvastatin 80 mg Oral Daily With KeySNUNO dave    bisacodyl 10 mg Rectal Daily PRN Sophiel Ale Davis PA-C    cefTRIAXone 1,000 mg Intravenous Q24H Anh Jean-Baptiste MD Last Rate: Stopped (03/29/18 1807)   collagenase  Topical Daily Debbi Grimaldo DPM    docusate sodium 100 mg Oral BID Bonnie Sewell PA-C    fondaparinux 2 5 mg Subcutaneous Daily Shay Davis PA-C    furosemide 20 mg Intravenous BID (diuretic) Bonnie Sewell PA-C    insulin regular (HumuLIN R,NovoLIN R) infusion 0 3-21 Units/hr Intravenous Titrated Shay Davis PA-C Last Rate: Stopped (03/30/18 0600)   midodrine 10 mg Oral TID With Meals Bonnie Sewell PA-C    mupirocin 1 application Nasal Q77Z [address] Shay Davis PA-C    ondansetron 4 mg Intravenous Q6H PRN Shay Davis PA-C    oxyCODONE-acetaminophen 1 tablet Oral Q4H PRN Annika Valera PA-C    oxyCODONE-acetaminophen 2 tablet Oral Q6H PRN Shay Davis PA-C    pantoprazole 40 mg Oral Early Morning Shay Davis PA-C    phenylephine  mcg/min Intravenous Titrated Jesu Ingram PA-C Last Rate: 40 mcg/min (03/30/18 0451)   polyethylene glycol 17 g Oral Daily Shay Davis PA-C    potassium chloride 10 mEq Oral BID Bonnie Sewell PA-C    sodium chloride 20 mL/hr Intravenous Continuous Shay Davis PA-C Last Rate: 10 mL/hr (03/29/18 2000)   tamsulosin 0 4 mg Oral Daily With Iris Sewell PA-C    temazepam 15 mg Oral HS PRN Ansley Sewell PA-C      Continuous Infusions:    insulin regular (HumuLIN R,NovoLIN R) infusion 0 3-21 Units/hr Last Rate: Stopped (03/30/18 0600)   phenylephine  mcg/min Last Rate: 40 mcg/min (03/30/18 0627)   sodium chloride 20 mL/hr Last Rate: 10 mL/hr (03/29/18 2000)     PRN Meds:   acetaminophen    bisacodyl    ondansetron    oxyCODONE-acetaminophen    oxyCODONE-acetaminophen    temazepam    Vitals: Blood pressure (!) 87/50, pulse 80, temperature 98 3 °F (36 8 °C), temperature source Oral, resp  rate 19, height 6' 1" (1 854 m), weight 115 kg (253 lb 12 oz), SpO2 97 %  ,Body mass index is 33 48 kg/m²  I/O last 24 hours: In: 2117 3 [P O :720; I V :797 3; IV Piggyback:600]  Out: 1487 [Urine:2285; Chest Tube:420]  Invasive Devices     Central Venous Catheter Line            CVC Central Lines 03/28/18 Triple 1 day          Peripheral Intravenous Line            Peripheral IV 03/28/18 Right Antecubital 1 day          Arterial Line            Arterial Line 03/28/18 Right Radial 1 day          Line            Pacer Wires 1 day    Pacer Wires 1 day          Drain            Chest Tube 1 Anterior Mediastinal 32 Fr  1 day    Chest Tube 2 Left Pleural 32 Fr  1 day    Chest Tube 3 Posterior Mediastinal 32 Fr  1 day    Urethral Catheter Temperature probe 16 Fr  1 day                  Lab, Imaging and other studies:     Results from last 7 days  Lab Units 03/30/18  0424 03/29/18  0402 03/28/18  2125  03/28/18  1343  03/27/18  0524   WBC Thousand/uL 11 18* 11 02*  --   --   --   --  10 96*   HEMOGLOBIN g/dL 8 7* 9 6* 10 0*  --  10 3*  --  11 1*   I STAT HEMOGLOBIN   --   --   --   < >  --   < >  --    HEMATOCRIT % 26 8* 30 6* 30 5*  --  32 8*  --  34 6*   PLATELETS Thousands/uL 178 194  --   --  214  < > 273   < > = values in this interval not displayed      Results from last 7 days  Lab Units 03/30/18  0424 03/29/18  0402 03/28/18  4873 03/28/18  1343   SODIUM mmol/L 134* 139  --   --  138   POTASSIUM mmol/L 4 3 5 0 5 2  < > 3 4*   CHLORIDE mmol/L 100 106  --   --  104   CO2 mmol/L 30 27  --   --  24   BUN mg/dL 13 12  --   --  13   CREATININE mg/dL 0 70 0 69  --   --  0 85   GLUCOSE RANDOM mg/dL 118 122  --   --  168*   GLUCOSE, ISTAT   --   --   --   < >  --    CALCIUM mg/dL 7 9* 7 6*  --   --  8 1*   < > = values in this interval not displayed  Results from last 7 days  Lab Units 03/25/18  0544   INR  1 06   PTT seconds 29     No results for input(s): PHART, VYB4UPN, PO2ART, BUB9XUL, P6XLBWLA, BEART in the last 72 hours  Plan:    Wean [place] gtt to off  DC Zoya  DC Stout   Continue chest tubes,   DC pacing wires  Transfer to floor  PT/OT  Non-weight bearing LLE - this means wheelchair only in setting of sternotomy  Incentive spirometry  Gentle Diuresis  PO ASA/Statin/  Hold B blocker for hypotension        SIGNATURE: Nhan Black MD  DATE: March 30, 2018  TIME: 7:39 AM

## 2018-03-30 NOTE — PROGRESS NOTES
Cardiology Progress Note - Jefferson Castro 61 y o  male MRN: [id number]    Unit/Bed#: [facility] 416-01 Encounter: [id number]        Subjective:    No significant events overnight  Feels well today  Review of Systems   Cardiovascular: Positive for leg swelling  Negative for chest pain and palpitations  Respiratory: Negative for shortness of breath  Objective:   Vitals: Blood pressure 96/60, pulse 72, temperature 98 6 °F (37 °C), temperature source Probe, resp  rate (!) 23, height 6' 1" (1 854 m), weight 115 kg (253 lb 12 oz), SpO2 99 %  , Body mass index is 33 48 kg/m² ,   Orthostatic Blood Pressures    Flowsheet Row Most Recent Value   Blood Pressure  96/60 filed at 03/30/2018 0800   Patient Position - Orthostatic VS  Sitting filed at 03/30/2018 4892         Systolic (94OFC), CKD:52 , Min:64 , WLE:415     Diastolic (43HEN), IGE:42, Min:40, Max:64      Intake/Output Summary (Last 24 hours) at 03/30/18 1005  Last data filed at 03/30/18 0845   Gross per 24 hour   Intake          2015 55 ml   Output             1580 ml   Net           435 55 ml     Weight (last 2 days)     Date/Time   Weight    03/30/18 0600  115 (253 75)    03/29/18 0600  116 (256 62)    03/28/18 0552  117 (258 82)              Telemetry Review: NSR    Physical Exam   Cardiovascular: Normal rate, regular rhythm and normal heart sounds  Exam reveals no gallop and no friction rub  No murmur heard  2+ B LE edema   Pulmonary/Chest: Breath sounds normal  He has no wheezes  He has no rales           Laboratory Results:        CBC with diff:     Results from last 7 days  Lab Units 03/30/18  0424 03/29/18  0402 03/28/18  2125 03/28/18  1422 03/28/18  1343 03/28/18  1238 03/28/18  1225 03/28/18  1218  03/27/18  0524 03/26/18  0431 03/25/18  0544   WBC Thousand/uL 11 18* 11 02*  --   --   --   --   --   --   --  10 96* 9 87 8 71   HEMOGLOBIN g/dL 8 7* 9 6* 10 0*  --  10 3*  --   --   --   --  11 1* 11 8* 11 9*   I STAT HEMOGLOBIN g/dl  --   --   -- 10 2*  --  8 8* 8 8*  --   < >  --   --   --    HEMATOCRIT % 26 8* 30 6* 30 5*  --  32 8*  --   --   --   --  34 6* 36 0* 36 4*   MCV fL 85 88  --   --   --   --   --   --   --  85 85 85   PLATELETS Thousands/uL 178 194  --   --  214  --   --  273  --  273 286 291   MCH pg 27 7 27 5  --   --   --   --   --   --   --  27 3 27 8 27 7   MCHC g/dL 32 5 31 4  --   --   --   --   --   --   --  32 1 32 8 32 7   RDW % 14 4 14 4  --   --   --   --   --   --   --  14 2 14 1 13 8   MPV fL 9 2 9 9  --   --  9 1  --   --  9 2  --  9 2 10 0 9 7   NRBC AUTO /100 WBCs  --   --   --   --   --   --   --   --   --  0 0 0   < > = values in this interval not displayed  CMP:    Results from last 7 days  Lab Units 03/30/18  0424 03/29/18  0402 03/28/18  2125 03/28/18  1737 03/28/18  1422 03/28/18  1343 03/28/18  1238 03/28/18  1225 03/28/18  1205  03/27/18  0524 03/26/18  0431 03/25/18  0544   SODIUM mmol/L 134* 139  --   --   --  138  --   --   --   --  133* 134* 133*   POTASSIUM mmol/L 4 3 5 0 5 2 5 2  --  3 4*  --   --   --   --  3 9 3 9 4 3   CHLORIDE mmol/L 100 106  --   --   --  104  --   --   --   --  98* 99* 100   CO2 mmol/L 30 27  --   --   --  24  --   --   --   --  27 26 29   ANION GAP mmol/L 4 6  --   --   --  10  --   --   --   --  8 9 4   BUN mg/dL 13 12  --   --   --  13  --   --   --   --  17 17 12   CREATININE mg/dL 0 70 0 69  --   --   --  0 85  --   --   --   --  0 99 0 99 0 89   GLUCOSE RANDOM mg/dL 118 122  --   --   --  168*  --   --   --   --  143* 120 178*   GLUCOSE, ISTAT mg/dl  --   --   --   --  165*  --  151* 139 127  < >  --   --   --    CALCIUM mg/dL 7 9* 7 6*  --   --   --  8 1*  --   --   --   --  8 4 8 8 8 9   EGFR ml/min/1 73sq m 104 105  --   --   --  96  --   --   --   --  84 84 94   < > = values in this interval not displayed        BMP:    Results from last 7 days  Lab Units 03/30/18  0424 03/29/18  0402 03/28/18  2125 03/28/18  1737  03/28/18  1343  03/27/18  5143 03/26/18  5738 03/25/18  7210 SODIUM mmol/L 134* 139  --   --   --  138  --  133* 134* 133*   POTASSIUM mmol/L 4 3 5 0 5 2 5 2  --  3 4*  --  3 9 3 9 4 3   CHLORIDE mmol/L 100 106  --   --   --  104  --  98* 99* 100   CO2 mmol/L 30 27  --   --   --  24  --  27 26 29   BUN mg/dL 13 12  --   --   --  13  --  17 17 12   CREATININE mg/dL 0 70 0 69  --   --   --  0 85  --  0 99 0 99 0 89   GLUCOSE RANDOM mg/dL 118 122  --   --   --  168*  --  143* 120 178*   GLUCOSE, ISTAT   --   --   --   --   < >  --   < >  --   --   --    CALCIUM mg/dL 7 9* 7 6*  --   --   --  8 1*  --  8 4 8 8 8 9   < > = values in this interval not displayed  Magnesium:     Results from last 7 days  Lab Units 18  0424 18  0402 18  0524 18  0431 18  0544   MAGNESIUM mg/dL 2 2 2 3 1 9 2 0 2 1       Coags:     Results from last 7 days  Lab Units 18  0544   PTT seconds 29   INR  1 06       Cardiac testing:   Results for orders placed during the hospital encounter of 18   Echo complete with contrast if indicated    Narrative [address] [place] [address], [address]  [phone]    Transthoracic Echocardiogram  2D, M-mode, Doppler, and Color Doppler    Study date:  15-Mar-2018    Patient: Capo Cui  MR number: [id number]  Account number: [de-identified]  : 1960  Age: 61 years  Gender: Male  Status: Inpatient  Location: Bedside  Height: 73 in  Weight: 258 lb  BP: 160/ 85 mmHg    Indications: Acute myocardial infarction  Diagnoses: I21 4 - Non-ST elevation (NSTEMI) myocardial infarction    Sonographer:  Nelsy Trejo RDCS  Primary Physician:  Lilly Costello DO  Referring Physician:  Evelyn Noble PA-C  Group:  [facility]  Interpreting Physician:  Bro Negro MD    SUMMARY    LEFT VENTRICLE:  Size was at the upper limits of normal   Systolic function was normal  Ejection fraction was estimated in the range of 55 % to 60 %    There was moderate hypokinesis of the basal inferior wall(s)  Wall thickness was at the upper limits of normal     MITRAL VALVE:  There was trace regurgitation  TRICUSPID VALVE:  There was trace regurgitation  PULMONIC VALVE:  There was trace regurgitation  HISTORY: PRIOR HISTORY: Hypertension, Diabetes, Peripheral vascular disease, Elevated troponin  PROCEDURE: The procedure was performed at the bedside  This was a routine study  The transthoracic approach was used  The study included complete 2D imaging, M-mode, complete spectral Doppler, and color Doppler  The heart rate was 73 bpm,  at the start of the study  Images were obtained from the parasternal, apical, subcostal, and suprasternal notch acoustic windows  Echocardiographic views were limited due to decreased penetration and lung interference  This was a  technically difficult study  LEFT VENTRICLE: Size was at the upper limits of normal  Systolic function was normal  Ejection fraction was estimated in the range of 55 % to 60 %  There was moderate hypokinesis of the basal inferior wall(s)  Wall thickness was at the  upper limits of normal  DOPPLER: There was an increased relative contribution of atrial contraction to ventricular filling  The deceleration time of the early transmitral flow velocity was normal     RIGHT VENTRICLE: The size was normal  Systolic function was normal  Wall thickness was normal     LEFT ATRIUM: Size was at the upper limits of normal     RIGHT ATRIUM: Size was normal     MITRAL VALVE: Valve structure was normal  There was normal leaflet separation  DOPPLER: The transmitral velocity was within the normal range  There was no evidence for stenosis  There was trace regurgitation  AORTIC VALVE: The valve was trileaflet  Leaflets exhibited normal thickness and normal cuspal separation  DOPPLER: Transaortic velocity was within the normal range  There was no evidence for stenosis  There was no regurgitation      TRICUSPID VALVE: The valve structure was normal  There was normal leaflet separation  DOPPLER: The transtricuspid velocity was within the normal range  There was no evidence for stenosis  There was trace regurgitation  Pulmonary artery  systolic pressure was within the normal range  Estimated peak PA pressure was 20 mmHg  PULMONIC VALVE: Leaflets exhibited normal thickness, no calcification, and normal cuspal separation  DOPPLER: The transpulmonic velocity was within the normal range  There was trace regurgitation  PERICARDIUM: The amount of pericardial fluid appeared to be at the upper limits of normal     AORTA: The root exhibited normal size  SYSTEMIC VEINS: IVC: The inferior vena cava was not well visualized  SYSTEM MEASUREMENT TABLES    2D  Ao Diam: 3 6 cm  IVSd: 1 cm  LA Diam: 4 cm  LVIDd: 5 3 cm  LVIDs: 3 9 cm  LVPWd: 0 9 cm    CW  TR Vmax: 1 9 m/s  TR maxP 7 mmHg    PW  E': 0 m/s  E/E': 15 2  MV A Rocky: 0 7 m/s  MV Dec [place]: 5 4 m/s2  MV DecT: 127 7 ms  MV E Rocky: 0 7 m/s  MV E/A Ratio: 1    [employer] Accredited Echocardiography Laboratory    Prepared and electronically signed by    Angeles Curry MD  Signed 15-Mar-2018 13:11:20       Results for orders placed during the hospital encounter of 18   NM myocardial perfusion spect (stress and/or rest)    Narrative [address] [place] [address], [address] [facility]  [phone]    Rest/Stress Gated SPECT Myocardial Perfusion Imaging After Regadenoson    Patient: Julianne Neff  MR number: [id number]  Account number: [de-identified]  : 1960  Age: 61 years  Gender: Male  Status: Inpatient  Location: Stress lab  Height: 73 in  Weight: 258 lb  BP: 153/ 76 mmHg    Allergies: METFORMIN    Diagnosis: R94 39 - Abnormal result of other cardiovascular function study, Z01 810 - Encounter for preprocedural cardiovascular examination    Primary Physician:  Harper Hines DO  Technician:  Ar Norman  RN:  Nell Randhawa RN BSN  Referring Physician: Thuy Figueredo MD  Group:  [facility]  Report Prepared By[de-identified]  Jimenez Maldonado RN BSN  Interpreting Physician:  Thuy Figueredo MD    INDICATIONS: Detection of Coronary artery disease  Pre-operative risk assessment  HISTORY: The patient is a 60year old  male  Chest pain status: no chest pain  Other symptoms: decreased effort tolerance  Coronary artery disease risk factors: dyslipidemia and diabetes mellitus  Cardiovascular history:  peripheral vascular occlusive disease(PAD)  Co-morbidity: obesity  Medications: a lipid lowering agent  Previous test results: abnormal ECG and abnormal resting echocardiogram     PHYSICAL EXAM: Baseline physical exam screening: normal lung exam     REST ECG: Normal sinus rhythm  There was 1-2 mm ST depression in leads II, III and aVF  PROCEDURE: The study was performed in the stress lab  A regadenoson infusion pharmacologic stress test was performed  Gated SPECT myocardial perfusion imaging was performed after stress and at rest  Systolic blood pressure was 153 mmHg, at  the start of the study  Diastolic blood pressure was 76 mmHg, at the start of the study  The heart rate was 78 bpm, at the start of the study  IV double checked  Regadenoson protocol:  HR bpm SBP mmHg DBP mmHg Symptoms  Baseline 78 153 76 none  Immediate 86 105 64 moderate dyspnea  3 min 81 -- -- subsiding  5 min 82 115 64 none  No medications or fluids given  STRESS SUMMARY: Duration of pharmacologic stress was 3 min  Maximal heart rate during stress was 86 bpm  The rate-pressure product for the peak heart rate and blood pressure was 81582  There was no chest pain during stress  The stress test  was terminated due to protocol completion  Pre oxygen saturation: 97 %  Peak oxygen saturation: 98 %  Arrhythmia during stress: isolated atrial premature beats  The stress ECG was non-diagnostic due to resting ST/T wave abnormality      ISOTOPE ADMINISTRATION:  Resting isotope administration Stress isotope administration  Agent Tetrofosmin Tetrofosmin  Dose 15 2 mCi 48 8 mCi  Date 03/22/2018 03/22/2018  Injection time 10:34 11:40  Imaging time 11:08 12:26  Injection-image interval 34 min 46 min    The radiopharmaceutical was injected at the peak effect of pharmacologic stress  MYOCARDIAL PERFUSION IMAGING:  The image quality was good  The left ventricle was mildly dilated  The TID ratio was 1 28  PERFUSION DEFECTS:  -  There was a large, moderately severe, partially reversible myocardial perfusion defect of the entire inferolateral wall  GATED SPECT:  The calculated left ventricular ejection fraction was 45 %  Left ventricular ejection fraction was mildly decreased by visual estimate  There was moderately reduced myocardial thickening and motion of the inferior wall and lateral wall of  the left ventricle  SUMMARY:  -  Stress results: There was no chest pain during stress  -  ECG conclusions: The stress ECG was non-diagnostic due to resting ST/T wave abnormality   -  Perfusion imaging: There was a large, moderately severe, partially reversible myocardial perfusion defect of the entire inferolateral wall  -  Gated SPECT: The calculated left ventricular ejection fraction was 45 %  Left ventricular ejection fraction was mildly decreased by visual estimate  There was moderately reduced myocardial thickening and motion of the inferior wall and  lateral wall of the left ventricle  IMPRESSIONS: Abnormal study after pharmacologic stress      Prepared and signed by    Regine Mclean MD  Signed 03/22/2018 83:72:59         Meds/Allergies     Current Facility-Administered Medications:  acetaminophen 650 mg Oral Q4H PRN Glenroy Wilkerson PA-C    amiodarone 200 mg Oral Q8H [facility] Shay Davis PA-C    aspirin 325 mg Oral Daily Shay Davis PA-C    atorvastatin 80 mg Oral Daily With NUNO Singh    bisacodyl 10 mg Rectal Daily PRN Glenroy Wilkerson PA-C cefTRIAXone 1,000 mg Intravenous Q24H Wilmer Priest MD Last Rate: Stopped (03/29/18 1807)   collagenase  Topical Daily Debbi Grimaldo, RANDELL    docusate sodium 100 mg Oral BID Bonnie Sewell PA-C    fondaparinux 2 5 mg Subcutaneous Daily Shay Davis PA-C    furosemide 20 mg Intravenous BID (diuretic) Bonnie Sewell PA-C    insulin regular (HumuLIN R,NovoLIN R) infusion 0 3-21 Units/hr Intravenous Titrated Shay Davis PA-C Last Rate: Stopped (03/30/18 0600)   midodrine 10 mg Oral TID With Meals Bonnie Sewell PA-C    mupirocin 1 application Nasal B96D [facility] Shay Davis PA-C    ondansetron 4 mg Intravenous Q6H PRN Shay Davis PA-C    oxyCODONE-acetaminophen 1 tablet Oral Q4H PRN Robert Asher PA-C    oxyCODONE-acetaminophen 2 tablet Oral Q6H PRN Shay Davis PA-C    pantoprazole 40 mg Oral Early Morning Shay Davis PA-C    phenylephine  mcg/min Intravenous Titrated Krystal Calderón PA-C Last Rate: 50 mcg/min (03/30/18 0932)   polyethylene glycol 17 g Oral Daily Shay Davis PA-C    potassium chloride 10 mEq Oral BID Bonnie Sewell PA-C    sodium chloride 20 mL/hr Intravenous Continuous Shay Davis PA-C Last Rate: Stopped (03/30/18 0800)   tamsulosin 0 4 mg Oral Daily With Dinner Bonnie Sewell PA-C    temazepam 15 mg Oral HS PRN Bonnie Sewell PA-C        insulin regular (HumuLIN R,NovoLIN R) infusion 0 3-21 Units/hr Last Rate: Stopped (03/30/18 0600)   phenylephine  mcg/min Last Rate: 50 mcg/min (03/30/18 0932)   sodium chloride 20 mL/hr Last Rate: Stopped (03/30/18 0800)     Facility-Administered Medications Prior to Admission   Medication    silver sulfadiazine (SILVADENE,SSD) 1 % cream     Prescriptions Prior to Admission   Medication    atorvastatin (LIPITOR) 40 mg tablet    insulin aspart protamine-insulin aspart (NOVOLOG MIX 70/30 FLEXPEN) 100 Units/mL SUPN    LANTUS SOLOSTAR injection pen 100 units/mL    LORazepam (ATIVAN) 0 5 mg tablet    metoclopramide (REGLAN) 5 mg tablet    midodrine (PROAMATINE) 5 mg tablet    torsemide (DEMADEX) 10 mg tablet       Assessment:  Principal Problem:    Triple vessel coronary artery disease  Active Problems:    Anxiety and depression    Diabetic neuropathy, type II diabetes mellitus ([facility])    Hyperlipidemia    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 ([facility])    PAD (peripheral artery disease) ([facility])    Non-healing wound of left heel    Diabetic gastroparesis ([facility])    Bilateral lower extremity edema    Class 2 obesity due to excess calories with serious comorbidity and body mass index (BMI) of 35 0 to 35 9 in adult    Acute on chronic diastolic heart failure (HCC)    S/P CABG x 3    Acute respiratory insufficiency, postoperative    Diabetic autonomic neuropathy associated with type 2 diabetes mellitus ([facility])      Impression:  1  Type II NSTEMI - multivessel CAD - s/p CABG  2  Acute on chronic diastolic heart failure - diuresing on furosemide IV  3  PAD - revascularization after CABG  4  Autonomic neuropathy - patient with hypotension yesterday when working with PT  Started on phenylephrine gtt  Restarting home midodrine  Plan:  1  Restart midodrine  2  Continue remainder of medications

## 2018-03-30 NOTE — PROGRESS NOTES
Progress Note - Critical Care   Feliberto Marie 61 y o  male MRN: [id number]  Unit/Bed#: [facility] 416-01 Encounter: [id number]    Attending Physician: Cecilio Singh MD    24 Hour Events: POD # 2 s/p CABG x 3  Was cleared by podiatry for partial weight bearing with nursing and PT  While working with PT became hypotensive to 50's with standing given albumin 500cc with appropriate response  [place] maintained between 40-50 throughout the past 24 hours  Restarted home midodrine yesterday  Allergies:    Allergies   Allergen Reactions    Metformin        Medications:   Scheduled Meds:  Current Facility-Administered Medications:  acetaminophen 650 mg Oral Q4H PRN Shay Davis PA-C    amiodarone 200 mg Oral Q8H [address] Shay Davis PA-C    aspirin 325 mg Oral Daily Shay Davis PA-C    atorvastatin 80 mg Oral Daily With KeySpanNUNO    bisacodyl 10 mg Rectal Daily PRN Miguel Davis PA-C    cefTRIAXone 1,000 mg Intravenous Q24H Logan Stallings MD Last Rate: Stopped (03/29/18 1807)   collagenase  Topical Daily Debbi Grimaldo, DPM    docusate sodium 100 mg Oral BID Bonnie Sewell PA-C    fondaparinux 2 5 mg Subcutaneous Daily Shay Davis PA-C    furosemide 20 mg Intravenous BID (diuretic) Bonnie Sewell PA-C    insulin regular (HumuLIN R,NovoLIN R) infusion 0 3-21 Units/hr Intravenous Titrated Shay Davis PA-C Last Rate: Stopped (03/30/18 0600)   midodrine 5 mg Oral TID With Meals Bonnie Sewell PA-C    mupirocin 1 application Nasal Q27Z [address] Shay Davis PA-C    ondansetron 4 mg Intravenous Q6H PRN Shay Davis PA-C    oxyCODONE-acetaminophen 1 tablet Oral Q4H PRN Peg Maldonado PA-C    oxyCODONE-acetaminophen 2 tablet Oral Q6H PRN Shay Davis PA-C    pantoprazole 40 mg Oral Early Morning Martene J [place], PA-C    phenylephine  mcg/min Intravenous Titrated Jakub Randolph PA-C Last Rate: 40 mcg/min (03/30/18 3058)   polyethylene glycol 17 g Oral Daily Shay Davis PA-C    potassium chloride 10 mEq Oral BID Bonnie Sewell PA-C    sodium chloride 20 mL/hr Intravenous Continuous Jj Palacio PA-C Last Rate: 10 mL/hr (03/29/18 2000)   tamsulosin 0 4 mg Oral Daily With Dinner Bonnie Sewell PA-C    temazepam 15 mg Oral HS PRN Darrel Felice Sewell PA-C        VTE Pharmacologic Prophylaxis: Fondaparinux (Arixtra)  VTE Mechanical Prophylaxis: sequential compression device    Continuous Infusions:  insulin regular (HumuLIN R,NovoLIN R) infusion 0 3-21 Units/hr Last Rate: Stopped (03/30/18 0600)   phenylephine  mcg/min Last Rate: 40 mcg/min (03/30/18 0627)   sodium chloride 20 mL/hr Last Rate: 10 mL/hr (03/29/18 2000)     PRN Meds:    acetaminophen 650 mg Q4H PRN   bisacodyl 10 mg Daily PRN   ondansetron 4 mg Q6H PRN   oxyCODONE-acetaminophen 1 tablet Q4H PRN   oxyCODONE-acetaminophen 2 tablet Q6H PRN   temazepam 15 mg HS PRN       Home Medications:   Prior to Admission medications    Medication Sig Start Date End Date Taking?  Authorizing Provider   atorvastatin (LIPITOR) 40 mg tablet Take 1 tablet (40 mg total) by mouth daily 2/20/18   Kimberly Anna, DO   insulin aspart protamine-insulin aspart (NOVOLOG MIX 70/30 FLEXPEN) 100 Units/mL SUPN Inject under the skin Twice daily 2/11/15   Historical Provider, MD   LANTUS SOLOSTAR injection pen 100 units/mL Inject 0 8 mL (80 Units total) under the skin 2 (two) times a day 2/20/18   Kimberly Huntere, DO   LORazepam (ATIVAN) 0 5 mg tablet Take by mouth every 8 (eight) hours as needed for anxiety    Historical Provider, MD   metoclopramide (REGLAN) 5 mg tablet Take 5 mg by mouth daily      Historical Provider, MD   midodrine (PROAMATINE) 5 mg tablet Take 1 tablet (5 mg total) by mouth 3 (three) times a day 2/20/18   Kimberly Huntere DO   torsemide [facility]) 10 mg tablet Take 1 tablet (10 mg total) by mouth daily 2/21/18   Kimberly Anna DO       Vitals:   Vitals:    03/30/18 2081 03/30/18 0558 03/30/18 0600 03/30/18 0602   BP:   (!) 87/50    BP Location:       Pulse: 76 64 80 80   Resp: (!) 28 (!) 25 (!) 26 19   Temp:       TempSrc:       SpO2: 90% 92% 91% 95%   Weight:   115 kg (253 lb 12 oz)    Height:         Arterial Line BP: 150/62  Arterial Line MAP (mmHg): 90 mmHg    Tele Rhythm: NSR This was personally reviewed by myself  Respiratory:  SpO2: SpO2: 95 %  O2 Flow Rate (L/min): 2 L/min    Temperature: Temp (24hrs), Av 6 °F (37 °C), Min:98 3 °F (36 8 °C), Max:99 2 °F (37 3 °C)  Current: Temperature: 98 3 °F (36 8 °C)    Weights:   Weight (last 2 days)     Date/Time   Weight    18 0600  115 (253 75)    18 0600  116 (256 62)    18 0552  117 (258 82)            IBW: 79 9 kg  Body mass index is 33 48 kg/m²  Hemodynamic Monitoring:  N/A       Intake and Outputs:  Intake/Output Summary (Last 24 hours) at 18 0638  Last data filed at 18 0600   Gross per 24 hour   Intake          1877 25 ml   Output             2705 ml   Net          -827 75 ml     I/O last 24 hours: In: 2904 2 [P O :480; I V :1324 2; IV Piggyback:1100]  Out: 7611 [Urine:3205; Chest Tube:910]    UOP: 50-600cc/hour     Chest tube Output:  Mediastinal tubes: 60 mL/8 hours  240 mL/24 hours   Pleural tubes: 50 mL/8 hours  180 mL/24 hours       Labs:    Results from last 7 days  Lab Units 18  0424 18  0402 18  2125  18  1343  18  0524 18  0431 18  0544   WBC Thousand/uL 11 18* 11 02*  --   --   --   --  10 96* 9 87 8 71   HEMOGLOBIN g/dL 8 7* 9 6* 10 0*  --  10 3*  --  11 1* 11 8* 11 9*   I STAT HEMOGLOBIN   --   --   --   < >  --   < >  --   --   --    HEMATOCRIT % 26 8* 30 6* 30 5*  --  32 8*  --  34 6* 36 0* 36 4*   PLATELETS Thousands/uL 178 194  --   --  214  < > 273 286 291   NEUTROS PCT %  --   --   --   --   --   --  67 69 67   MONOS PCT %  --   --   --   --   --   --  7 7 7   < > = values in this interval not displayed      Results from last 7 days  Lab Units 18  0424 03/29/18  0402 03/28/18  2125  03/28/18  1422 03/28/18  1343   SODIUM mmol/L 134* 139  --   --   --  138   POTASSIUM mmol/L 4 3 5 0 5 2  < >  --  3 4*   CHLORIDE mmol/L 100 106  --   --   --  104   CO2 mmol/L 30 27  --   --   --  24   BUN mg/dL 13 12  --   --   --  13   CREATININE mg/dL 0 70 0 69  --   --   --  0 85   CALCIUM mg/dL 7 9* 7 6*  --   --   --  8 1*   GLUCOSE RANDOM mg/dL 118 122  --   --   --  168*   GLUCOSE, ISTAT mg/dl  --   --   --   --  165*  --    < > = values in this interval not displayed  Results from last 7 days  Lab Units 03/30/18  0424 03/29/18  0402 03/27/18  0524   MAGNESIUM mg/dL 2 2 2 3 1 9       Results from last 7 days  Lab Units 03/27/18  0524 03/26/18  0431 03/25/18  0544   PHOSPHORUS mg/dL 4 0 4 8* 3 8        Results from last 7 days  Lab Units 03/25/18  0544   INR  1 06   PTT seconds 29                   Micro:   Blood Culture:   Lab Results   Component Value Date    BLOODCX No Growth After 5 Days  03/15/2018    BLOODCX No Growth After 5 Days  03/15/2018     Urine Culture: No results found for: URINECX  Sputum Culture: No components found for: SPUTUMCX  Wound Culure: No results found for: WOUNDCULT    Results from last 7 days  Lab Units 03/24/18  1656   MRSA CULTURE ONLY  No Methicillin Resistant Staphlyococcus aureus (MRSA) isolated       Imaging  No new    Nutrition:        Diet Orders            Start     Ordered    03/29/18 1202  Diet Cardiac; Cardiac TLC 2 3 GM NA; Fluid Restriction 1800 ML, Consistent Carbohydrate Diet Level 2 (5 carb servings/75 grams CHO/meal)  Diet effective 0500     Question Answer Comment   Diet Type Cardiac    Cardiac Cardiac TLC 2 3 GM NA    Other Restriction(s): Fluid Restriction 1800 ML    Other Restriction(s): Consistent Carbohydrate Diet Level 2 (5 carb servings/75 grams CHO/meal)    RD to adjust diet per protocol? No        03/29/18 1201          Physical Exam:    General Appearance:  NAD      HENT: Atraumatic without obvious abnormality  Eyes: without icterus  Cardiac: Regular rate and rhythm, No murmur, + rub  Pulmonary: Diminished to auscultation bilaterally    Gastrointestinal: Soft, Distention,  Nontender to palpation  : Stout present: yes   Musculoskeletal: 1+ edema bilaterally  Neuro:  Alert and appropriate nonfocal exam     Psych: Mood and affect WNL  Skin: warm and dry  Incisions: Sternum is stable  Incision dressed with Acticoat  No erythema or drainage      Invasive lines and devices:   Invasive Devices     Central Venous Catheter Line            CVC Central Lines 03/28/18 Triple 1 day          Peripheral Intravenous Line            Peripheral IV 03/28/18 Right Antecubital 1 day          Arterial Line            Arterial Line 03/28/18 Right Radial 1 day          Line            Pacer Wires 1 day    Pacer Wires 1 day          Drain            Chest Tube 1 Anterior Mediastinal 32 Fr  1 day    Chest Tube 2 Left Pleural 32 Fr  1 day    Chest Tube 3 Posterior Mediastinal 32 Fr  1 day    Urethral Catheter Temperature probe 16 Fr  1 day                Assessment:  Principal Problem:    Triple vessel coronary artery disease  Active Problems:    Anxiety and depression    Diabetic neuropathy, type II diabetes mellitus ([facility] )    Hyperlipidemia    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 ([facility])    PAD (peripheral artery disease) ([facility])    Non-healing wound of left heel    Diabetic gastroparesis ([facility])    Bilateral lower extremity edema    Class 2 obesity due to excess calories with serious comorbidity and body mass index (BMI) of 35 0 to 35 9 in adult    Acute on chronic diastolic heart failure (HCC)    S/P CABG x 3    Acute respiratory insufficiency, postoperative    Diabetic autonomic neuropathy associated with type 2 diabetes mellitus ([facility] )      Plan:    Neuro:   · Pain controlled with: PRN percocet  · Regulate sleep/wake cycle  · Trend neuro exam    CV: · Cardiac infusions: Neosynephrine, 40 mcg/min  · Wean as able  · Midodrine 5mg TID consider increasing  · MAP goal > 65  · Keep Arterial line  · Rhythm: NSR  · Follow rhythm on telemetry  · Hold Bblocker  · Epicardial pacing wires no longer required  Remove today  · Continue PO amio, statin, and ASA therapy    Lung:   · Encourage deep breathing, coughing, and incentive spirometry  · Wean NC as tolerated  · SpO2 goal >92%  · Chest tube output remains persistently high; Continue chest tubes to suction today    GI:   · Continue PPI for stress ulcer prophylaxis  · Continue bowel regimen    FEN:   · Diuretic plan: Lasix 20 mg IV q 12  · K-dur 10 mEQ PO q 12  · Goal 24 hour fluid balance: Neg  · Nutrition/diet plan: Cardiac  · Replete electrolytes with goals: K >4 0, Mag >2 0, and Phos >3 0    :   · Indwelling Stout present: yes   · Keep Stout  · Trend UOP and BUN/creat  · Strict I and O    ID:   · Trend temps and WBC count  · Maintain normothermia    Heme:   · Trend hgb and plts  · Transfuse as needed for goal hgb >7 0    Endo:    · Glycemic control plan: Endocrine following    MSK/Skin:  · Mobility goal:   · PT consult: yes  · OT consult: yes  · Frequent turning and pressure off-loading  · Local wound care as needed    Disposition:  Continue SD1 care    Code Status: Level 1 - Full Code    Collaborative bedside rounds performed with cardiac surgery attending and bedside RN      SIGNATURE: Marti Thomas PA-C  DATE: March 30, 2018  TIME: 6:38 AM

## 2018-03-30 NOTE — PHYSICAL THERAPY NOTE
PHYSICAL THERAPY NOTE      Patient Name: Connor Vuong  NRNAO'B Date: 3/30/2018        03/30/18 1410   Pain Assessment   Pain Assessment 0-10   Pain Score 5   Pain Type Acute pain   Pain Location Incision; Chest   Restrictions/Precautions   Weight Bearing Precautions Per Order Yes  (WBS per podiatry; CT surgery aware and in agreement)   LLE Weight Bearing Per Order PWB  (in heel unloading shoe for therapy only; otherwise, NWB)   Other Precautions Cardiac/sternal;Multiple lines;Telemetry;O2;Fall Risk;Pain;WBS   General   Chart Reviewed Yes   Response to Previous Treatment Patient reporting fatigue but able to participate  Family/Caregiver Present No   Cognition   Overall Cognitive Status WFL   Arousal/Participation Cooperative;Responsive; Alert   Attention Attends with cues to redirect   Orientation Level Oriented X4   Memory Within functional limits   Following Commands Follows multistep commands with increased time or repetition   Comments pt reports fatigue; occasionally closing eyes, but arousable and responsive   Subjective   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"   Bed Mobility   Additional Comments pt already OOB, seated in bedside chair  Transfers   Sit to Stand 3  Moderate assistance   Additional items Assist x 2; Increased time required;Verbal cues   Stand to Sit 3  Moderate assistance   Additional items Assist x 2; Increased time required;Verbal cues   Additional Comments modA x2 people marching in place with RW  Ambulation/Elevation   Gait pattern Short stride; Excessively slow; Step to;Decreased L stance;Shuffling;Decreased foot clearance   Gait Assistance 3  Moderate assist   Additional items Assist x 2;Verbal cues; Tactile cues   Assistive Device Rolling walker   Distance 1 ft fwd,bkwd; limited by lines and dizziness   Stair Management Assistance Not tested   Balance   Static Sitting Fair   Dynamic Sitting Fair -   Static Standing Poor +   Dynamic Standing Poor   Ambulatory Poor -   Endurance Deficit Endurance Deficit Yes   Endurance Deficit Description pain, fatigue, light-headedness   Activity Tolerance   Activity Tolerance Patient limited by fatigue;Treatment limited secondary to medical complications (Comment)  (orthostatic)   Nurse Made Aware yes; RN confirmed pt appropriate for PT  RN present during 2nd attempt for sit->stand to temporarily increase vasopressor dose   Assessment   Prognosis Fair   Assessment Pt cooperative with PT  Pt was able to perform sit<>stand with modA x2 people and perform marching in place modA x2  Pt progressed to amb 1ft forward  Pt c/o light-headedness, amb 1ft back toward chair and was assisted to sitting position  After brief rest period, pt stood again with modA x2  Pt able to march in place again for 2-3 min with RN present to temporarily increase dose of pressor to reduce orthostasis  Pt was assisted back to chair at end of visit, all lines attached, all needs within reach, RN at [facility]  Recommend IP rehab once medically cleared for d/c     Barriers to Discharge Inaccessible home environment;Decreased caregiver support   Goals   Patient Goals to feel better   LTG Expiration Date 04/12/18   Plan   Treatment/Interventions Functional transfer training;LE strengthening/ROM; Therapeutic exercise; Endurance training;Patient/family training;Equipment eval/education; Bed mobility;Gait training;Spoke to nursing   Progress Slow progress, medical status limitations   PT Frequency 5x/wk   Recommendation   Recommendation Post acute IP rehab   PT - OK to Discharge Yes  (to rehab when medically stable)     Yovanny Cade PT

## 2018-03-30 NOTE — CASE MANAGEMENT
Thank you,  [facility] in the [facility] by [place] for 2017  Network Utilization Review Department  Phone: [phone]; Fax [phone]  ATTENTION: The Network Utilization Review Department is now centralized for our 7 Facilities  Make a note that we have a new phone and fax numbers for our Department  Please call with any questions or concerns to [phone] and carefully follow the prompts so that you are directed to the right person  All voicemails are confidential  Fax any determinations, approvals, denials, and requests for initial or continue stay review clinical to [phone]   Due to HIGH CALL volume, it would be easier if you could please send faxed requests to expedite your requests and in part, help us provide discharge notifications faster     =====================================================================    Continued Stay Review    Date: 03/30/2018    POD # 2 s/p CABG     Vital Signs: BP 87/50 (BP Location: Left arm)   Pulse 72   Temp 98 6 °F (37 °C) (Probe)   Resp (!) 23   Ht 6' 1" (1 854 m)   Wt 115 kg (253 lb 12 oz)   SpO2 99%   BMI 33 48 kg/m²     Medications:   Scheduled Meds:   Current Facility-Administered Medications:  acetaminophen 650 mg Oral Q4H PRN    amiodarone 200 mg Oral Q8H [address]    aspirin 325 mg Oral Daily    atorvastatin 80 mg Oral Daily With Dinner    bisacodyl 10 mg Rectal Daily PRN    collagenase  Topical Daily    docusate sodium 100 mg Oral BID    fondaparinux 2 5 mg Subcutaneous Daily    furosemide 20 mg Intravenous BID (diuretic)    insulin regular (HumuLIN R,NovoLIN R) infusion 0 3-21 Units/hr Intravenous Titrated Last Rate: Stopped (03/30/18 0600)   midodrine 10 mg Oral TID With Meals    mupirocin 1 application Nasal [address]    ondansetron 4 mg Intravenous Q6H PRN    oxyCODONE-acetaminophen 1 tablet Oral Q4H PRN    oxyCODONE-acetaminophen 2 tablet Oral Q6H PRN    pantoprazole 40 mg Oral Early Morning phenylephine  mcg/min Intravenous Titrated Last Rate: 70 mcg/min (03/30/18 1232)   polyethylene glycol 17 g Oral Daily    potassium chloride 10 mEq Oral BID    sodium chloride 20 mL/hr Intravenous Continuous Last Rate: Stopped (03/30/18 0800)   tamsulosin 0 4 mg Oral Daily With Dinner    temazepam 15 mg Oral HS PRN      Continuous Infusions:   insulin regular (HumuLIN R,NovoLIN R) infusion 0 3-21 Units/hr Last Rate: Stopped (03/30/18 0600)   phenylephine  mcg/min Last Rate: 70 mcg/min (03/30/18 1232)   sodium chloride 20 mL/hr Last Rate: Stopped (03/30/18 0800)     Abnormal Labs/Diagnostic Results:   WBC 11 18  Hgl 8 7  Hct 26 8    Age/Sex: 61 y o  male   Triple CVC Line / A;Line right radial   Epicardial pacing wires A/V  Chest Tubes #1,2,3 (-20cm)    Assessment/Plan:   Plan:     Wean [place] gtt to off  DC Zoya  DC Stout   Continue chest tubes,   DC pacing wires  Transfer to floor  PT/OT  Non-weight bearing LLE - this means wheelchair only in setting of sternotomy  Incentive spirometry  Gentle Diuresis  PO ASA/Statin/  Hold B blocker for hypotension      Discharge Plan: TBD

## 2018-03-30 NOTE — PROGRESS NOTES
Progress Note - Feliberto Marie 61 y o  male MRN: [id number]    Unit/Bed#: [facility] 416-01 Encounter: [id number]      CC: diabetes f/u    Subjective:   Feliberto Marie is a 61y o  year old male with type 2 diabetes  Feels well  No complaints  No hypoglycemia  Eating well now  Objective:     Vitals: Blood pressure 96/60, pulse 68, temperature 98 6 °F (37 °C), temperature source Probe, resp  rate 21, height 6' 1" (1 854 m), weight 115 kg (253 lb 12 oz), SpO2 100 %  ,Body mass index is 33 48 kg/m²  Intake/Output Summary (Last 24 hours) at 03/30/18 1459  Last data filed at 03/30/18 1200   Gross per 24 hour   Intake          2479 75 ml   Output             1295 ml   Net          1184 75 ml       Physical Exam:  General: No acute distress  Alert, awake, and oriented x3  HEENT: Normocephalic, atraumatic  Heart: Regular rate and rhythm  Lungs: Clear  No respiratory distress  Skin: Warm, dry  No rash  Neuro: Moves all 4 extremities spontaneously  Psych: Appropriate mood and affect  Cooperative  Lab, Imaging and other studies: I have personally reviewed pertinent reports  POC Glucose   Date Value   03/30/2018 230 mg/dl (H)   03/30/2018 181 mg/dl (H)   03/30/2018 124 mg/dl   03/30/2018 128 mg/dl   03/30/2018 104 mg/dl   03/30/2018 117 mg/dl   03/30/2018 117 mg/dl   03/30/2018 107 mg/dl   03/29/2018 138 mg/dl   03/29/2018 191 mg/dl (H)   01/11/2018 130   09/07/2016 84       Assessment:  Type 2 diabetes with hyperglycemia and history of autonomic neuropathy  CAD status post CABG  Nonhealing wound of left heel peripheral artery disease    Plan:  Type 2 diabetes with hyperglycemia history of autonomic neuropathy:  Patient continues on insulin drip  I will continue his insulin drip for his basal insulin needs in the setting of continued requirement for vasopressor support with phenylephrine  Since he is eating, his recent hyperglycemia is likely due to increased po intake in the setting of an insulin drip  Will add Humalog 10 a c  to cover prandial needs hopefully prevent hyperglycemia while eating on the insulin drip  Will continue to follow and adjust as needed  Monitor for hypoglycemia tree per protocol  CAD status post CABG:  Per primary team     Left heel wound with gangrene and peripheral artery disease:  Podiatry of vascular surgery following

## 2018-03-30 NOTE — PLAN OF CARE
Problem: PHYSICAL THERAPY ADULT  Goal: Performs mobility at highest level of function for planned discharge setting  See evaluation for individualized goals  Treatment/Interventions: Functional transfer training, LE strengthening/ROM, Therapeutic exercise, Endurance training, Patient/family training, Gait training, Bed mobility, Equipment eval/education  Equipment Recommended: Wheelchair, [place]       See flowsheet documentation for full assessment, interventions and recommendations  Outcome: Progressing  Prognosis: Fair  Problem List: Decreased strength, Decreased endurance, Impaired balance, Decreased mobility, Obesity, Pain, Orthopedic restrictions  Assessment: Pt cooperative with PT  Pt was able to perform sit<>stand with modA x2 people and perform marching in place modA x2  Pt progressed to amb 1ft forward  Pt c/o light-headedness, amb 1ft back toward chair and was assisted to sitting position  After brief rest period, pt stood again with modA x2  Pt able to march in place again for 2-3 min with RN present to temporarily increase dose of pressor to reduce orthostasis  Pt was assisted back to chair at end of visit, all lines attached, all needs within reach, RN at [facility]  Recommend IP rehab once medically cleared for d/c    Barriers to Discharge: Inaccessible home environment, Decreased caregiver support     Recommendation: Post acute IP rehab     PT - OK to Discharge: Yes (to rehab when medically stable)    See flowsheet documentation for full assessment

## 2018-03-31 LAB
ANION GAP SERPL CALCULATED.3IONS-SCNC: 4 MMOL/L (ref 4–13)
BASOPHILS # BLD AUTO: 0.03 THOUSANDS/ΜL (ref 0–0.1)
BASOPHILS NFR BLD AUTO: 0 % (ref 0–1)
BUN SERPL-MCNC: 15 MG/DL (ref 5–25)
CALCIUM SERPL-MCNC: 7.9 MG/DL (ref 8.3–10.1)
CHLORIDE SERPL-SCNC: 100 MMOL/L (ref 100–108)
CO2 SERPL-SCNC: 29 MMOL/L (ref 21–32)
CREAT SERPL-MCNC: 0.86 MG/DL (ref 0.6–1.3)
EOSINOPHIL # BLD AUTO: 0.15 THOUSAND/ΜL (ref 0–0.61)
EOSINOPHIL NFR BLD AUTO: 1 % (ref 0–6)
ERYTHROCYTE [DISTWIDTH] IN BLOOD BY AUTOMATED COUNT: 14.4 % (ref 11.6–15.1)
GFR SERPL CREATININE-BSD FRML MDRD: 96 ML/MIN/1.73SQ M
GLUCOSE SERPL-MCNC: 112 MG/DL (ref 65–140)
GLUCOSE SERPL-MCNC: 124 MG/DL (ref 65–140)
GLUCOSE SERPL-MCNC: 126 MG/DL (ref 65–140)
GLUCOSE SERPL-MCNC: 126 MG/DL (ref 65–140)
GLUCOSE SERPL-MCNC: 130 MG/DL (ref 65–140)
GLUCOSE SERPL-MCNC: 132 MG/DL (ref 65–140)
GLUCOSE SERPL-MCNC: 133 MG/DL (ref 65–140)
GLUCOSE SERPL-MCNC: 135 MG/DL (ref 65–140)
GLUCOSE SERPL-MCNC: 135 MG/DL (ref 65–140)
GLUCOSE SERPL-MCNC: 138 MG/DL (ref 65–140)
GLUCOSE SERPL-MCNC: 146 MG/DL (ref 65–140)
GLUCOSE SERPL-MCNC: 172 MG/DL (ref 65–140)
GLUCOSE SERPL-MCNC: 190 MG/DL (ref 65–140)
HCT VFR BLD AUTO: 27.4 % (ref 36.5–49.3)
HGB BLD-MCNC: 8.7 G/DL (ref 12–17)
LYMPHOCYTES # BLD AUTO: 1.6 THOUSANDS/ΜL (ref 0.6–4.47)
LYMPHOCYTES NFR BLD AUTO: 14 % (ref 14–44)
MCH RBC QN AUTO: 28 PG (ref 26.8–34.3)
MCHC RBC AUTO-ENTMCNC: 31.8 G/DL (ref 31.4–37.4)
MCV RBC AUTO: 88 FL (ref 82–98)
MONOCYTES # BLD AUTO: 0.85 THOUSAND/ΜL (ref 0.17–1.22)
MONOCYTES NFR BLD AUTO: 7 % (ref 4–12)
NEUTROPHILS # BLD AUTO: 8.75 THOUSANDS/ΜL (ref 1.85–7.62)
NEUTS SEG NFR BLD AUTO: 78 % (ref 43–75)
NRBC BLD AUTO-RTO: 0 /100 WBCS
PLATELET # BLD AUTO: 214 THOUSANDS/UL (ref 149–390)
PMV BLD AUTO: 9.4 FL (ref 8.9–12.7)
POTASSIUM SERPL-SCNC: 4.3 MMOL/L (ref 3.5–5.3)
RBC # BLD AUTO: 3.11 MILLION/UL (ref 3.88–5.62)
SODIUM SERPL-SCNC: 133 MMOL/L (ref 136–145)
WBC # BLD AUTO: 11.42 THOUSAND/UL (ref 4.31–10.16)

## 2018-03-31 PROCEDURE — 85025 COMPLETE CBC W/AUTO DIFF WBC: CPT | Performed by: THORACIC SURGERY (CARDIOTHORACIC VASCULAR SURGERY)

## 2018-03-31 PROCEDURE — 99024 POSTOP FOLLOW-UP VISIT: CPT | Performed by: THORACIC SURGERY (CARDIOTHORACIC VASCULAR SURGERY)

## 2018-03-31 PROCEDURE — 80048 BASIC METABOLIC PNL TOTAL CA: CPT | Performed by: THORACIC SURGERY (CARDIOTHORACIC VASCULAR SURGERY)

## 2018-03-31 PROCEDURE — 99233 SBSQ HOSP IP/OBS HIGH 50: CPT | Performed by: EMERGENCY MEDICINE

## 2018-03-31 PROCEDURE — 99231 SBSQ HOSP IP/OBS SF/LOW 25: CPT | Performed by: INTERNAL MEDICINE

## 2018-03-31 PROCEDURE — 82948 REAGENT STRIP/BLOOD GLUCOSE: CPT

## 2018-03-31 RX ADMIN — DOCUSATE SODIUM 100 MG: 100 CAPSULE, LIQUID FILLED ORAL at 09:40

## 2018-03-31 RX ADMIN — COLLAGENASE SANTYL: 250 OINTMENT TOPICAL at 14:00

## 2018-03-31 RX ADMIN — MUPIROCIN 1 APPLICATION: 20 OINTMENT TOPICAL at 08:14

## 2018-03-31 RX ADMIN — PHENYLEPHRINE HYDROCHLORIDE 50 MCG/MIN: 10 INJECTION INTRAVENOUS at 11:46

## 2018-03-31 RX ADMIN — AMIODARONE HYDROCHLORIDE 200 MG: 200 TABLET ORAL at 13:14

## 2018-03-31 RX ADMIN — POLYETHYLENE GLYCOL 3350 17 G: 17 POWDER, FOR SOLUTION ORAL at 09:40

## 2018-03-31 RX ADMIN — FONDAPARINUX SODIUM 2.5 MG: 2.5 INJECTION, SOLUTION SUBCUTANEOUS at 08:14

## 2018-03-31 RX ADMIN — MIDODRINE HYDROCHLORIDE 10 MG: 5 TABLET ORAL at 13:14

## 2018-03-31 RX ADMIN — INSULIN LISPRO 10 UNITS: 100 INJECTION, SOLUTION INTRAVENOUS; SUBCUTANEOUS at 13:14

## 2018-03-31 RX ADMIN — AMIODARONE HYDROCHLORIDE 200 MG: 200 TABLET ORAL at 06:13

## 2018-03-31 RX ADMIN — PANTOPRAZOLE SODIUM 40 MG: 40 TABLET, DELAYED RELEASE ORAL at 06:13

## 2018-03-31 RX ADMIN — MIDODRINE HYDROCHLORIDE 10 MG: 5 TABLET ORAL at 08:15

## 2018-03-31 RX ADMIN — MIDODRINE HYDROCHLORIDE 10 MG: 5 TABLET ORAL at 16:46

## 2018-03-31 RX ADMIN — INSULIN LISPRO 10 UNITS: 100 INJECTION, SOLUTION INTRAVENOUS; SUBCUTANEOUS at 08:15

## 2018-03-31 RX ADMIN — ASPIRIN 325 MG: 325 TABLET ORAL at 08:15

## 2018-03-31 RX ADMIN — FUROSEMIDE 20 MG: 10 INJECTION, SOLUTION INTRAMUSCULAR; INTRAVENOUS at 16:46

## 2018-03-31 RX ADMIN — POTASSIUM CHLORIDE 10 MEQ: 750 TABLET, EXTENDED RELEASE ORAL at 18:25

## 2018-03-31 RX ADMIN — MUPIROCIN 1 APPLICATION: 20 OINTMENT TOPICAL at 21:05

## 2018-03-31 RX ADMIN — Medication 2 UNITS/HR: at 04:38

## 2018-03-31 RX ADMIN — SODIUM CHLORIDE 10 ML/HR: 0.45 INJECTION, SOLUTION INTRAVENOUS at 04:38

## 2018-03-31 RX ADMIN — ATORVASTATIN CALCIUM 80 MG: 80 TABLET, FILM COATED ORAL at 16:47

## 2018-03-31 RX ADMIN — TAMSULOSIN HYDROCHLORIDE 0.4 MG: 0.4 CAPSULE ORAL at 16:47

## 2018-03-31 RX ADMIN — FUROSEMIDE 20 MG: 10 INJECTION, SOLUTION INTRAMUSCULAR; INTRAVENOUS at 08:15

## 2018-03-31 RX ADMIN — AMIODARONE HYDROCHLORIDE 200 MG: 200 TABLET ORAL at 21:05

## 2018-03-31 RX ADMIN — POTASSIUM CHLORIDE 10 MEQ: 750 TABLET, EXTENDED RELEASE ORAL at 08:15

## 2018-03-31 RX ADMIN — INSULIN LISPRO 10 UNITS: 100 INJECTION, SOLUTION INTRAVENOUS; SUBCUTANEOUS at 16:47

## 2018-03-31 RX ADMIN — PHENYLEPHRINE HYDROCHLORIDE 80 MCG/MIN: 10 INJECTION INTRAVENOUS at 00:22

## 2018-03-31 NOTE — PROGRESS NOTES
Progress Note - Critical Care   Shay Duron 61 y o  male MRN: [id number]  Unit/Bed#: [facility] 416-01 Encounter: [id number]    Attending Physician: Trevon Luis MD    24 Hour Events: POD # 3 s/p CABG x 3  Continues having hypotensive episodes on with standing  Midodrine increased yesterday  Sanford has been between 30-80  Consultants: Endocrine, Cardiology, Critical Care, Podiatry    Allergies:    Allergies   Allergen Reactions    Metformin        Medications:   Scheduled Meds:  Current Facility-Administered Medications:  acetaminophen 650 mg Oral Q4H PRN Shay Davis PA-C    amiodarone 200 mg Oral Q8H [address] Shay Davis PA-C    aspirin 325 mg Oral Daily Shay Davis PA-C    atorvastatin 80 mg Oral Daily With KeySpanNUNO    bisacodyl 10 mg Rectal Daily PRN Walter Mendoza PA-C    collagenase  Topical Daily Debbi Grimaldo DPM    docusate sodium 100 mg Oral BID Bonnie Sewell PA-C    fondaparinux 2 5 mg Subcutaneous Daily Shay Davis PA-C    furosemide 20 mg Intravenous BID (diuretic) Bonnie Sewell PA-C    insulin lispro 10 Units Subcutaneous TID With Meals Mariano Izquierdo DO    insulin regular (HumuLIN R,NovoLIN R) infusion 0 3-21 Units/hr Intravenous Titrated Ronna Davis PA-C Last Rate: 2 Units/hr (03/31/18 0438)   midodrine 10 mg Oral TID With Meals Bonnie Sewell PA-C    mupirocin 1 application Nasal X86G [address] Shay Davis PA-C    ondansetron 4 mg Intravenous Q6H PRN Shay Davis PA-C    oxyCODONE-acetaminophen 1 tablet Oral Q4H PRN Walter Mendoza PA-C    oxyCODONE-acetaminophen 2 tablet Oral Q6H PRN Shay Davis PA-C    pantoprazole 40 mg Oral Early Morning Shay Davis PA-C    phenylephine  mcg/min Intravenous Titrated Arnold Padgett PA-C Last Rate: 80 mcg/min (03/31/18 0400)   polyethylene glycol 17 g Oral Daily Shay Davis PA-C    potassium chloride 10 mEq Oral BID Bonnie Sewell PA-C    sodium chloride 20 mL/hr Intravenous Continuous Shayna Sage PA-C Last Rate: 10 mL/hr (03/31/18 0438)   tamsulosin 0 4 mg Oral Daily With Dinner Bonnie Sewell PA-C    temazepam 15 mg Oral HS PRN Vera Sewell PA-C        VTE Pharmacologic Prophylaxis: Fondaparinux (Arixtra)  VTE Mechanical Prophylaxis: sequential compression device    Continuous Infusions:  insulin regular (HumuLIN R,NovoLIN R) infusion 0 3-21 Units/hr Last Rate: 2 Units/hr (03/31/18 0438)   phenylephine  mcg/min Last Rate: 80 mcg/min (03/31/18 0400)   sodium chloride 20 mL/hr Last Rate: 10 mL/hr (03/31/18 0438)     PRN Meds:    acetaminophen 650 mg Q4H PRN   bisacodyl 10 mg Daily PRN   ondansetron 4 mg Q6H PRN   oxyCODONE-acetaminophen 1 tablet Q4H PRN   oxyCODONE-acetaminophen 2 tablet Q6H PRN   temazepam 15 mg HS PRN       Home Medications:   Prior to Admission medications    Medication Sig Start Date End Date Taking?  Authorizing Provider   atorvastatin (LIPITOR) 40 mg tablet Take 1 tablet (40 mg total) by mouth daily 2/20/18   Lynette Rock, DO   insulin aspart protamine-insulin aspart (NOVOLOG MIX 70/30 FLEXPEN) 100 Units/mL SUPN Inject under the skin Twice daily 2/11/15   Historical Provider, MD   LANTUS SOLOSTAR injection pen 100 units/mL Inject 0 8 mL (80 Units total) under the skin 2 (two) times a day 2/20/18   Lynette Rock DO   LORazepam (ATIVAN) 0 5 mg tablet Take by mouth every 8 (eight) hours as needed for anxiety    Historical Provider, MD   metoclopramide (REGLAN) 5 mg tablet Take 5 mg by mouth daily      Historical Provider, MD   midodrine (PROAMATINE) 5 mg tablet Take 1 tablet (5 mg total) by mouth 3 (three) times a day 2/20/18   Lynette Rock, DO   torsemide [facility]) 10 mg tablet Take 1 tablet (10 mg total) by mouth daily 2/21/18   Lynette Rock DO       Vitals:   Vitals:    03/31/18 0200 03/31/18 0300 03/31/18 0400 03/31/18 0600   BP: 100/59 106/57 112/64    BP Location:       Pulse: 74 72 76    Resp: 12 12 16    Temp:   98 8 °F (37 1 °C)    TempSrc:   Oral    SpO2: 99% 100% 100%    Weight:    113 kg (249 lb 9 oz)   Height:         Arterial Line BP: 120/58  Arterial Line MAP (mmHg): 80 mmHg    Tele Rhythm: NSR This was personally reviewed by myself  Respiratory:  SpO2: SpO2: 99 %  O2 Flow Rate (L/min): 2 L/min    Temperature: Temp (24hrs), Av 5 °F (36 9 °C), Min:98 1 °F (36 7 °C), Max:98 8 °F (37 1 °C)  Current: Temperature: 98 8 °F (37 1 °C)    Weights:   Weight (last 2 days)     Date/Time   Weight    18  113 (249 56)    18 06  115 (253 75)    18  116 (256 62)            IBW: 79 9 kg  Body mass index is 32 93 kg/m²  Hemodynamic Monitoring:  N/A       Intake and Outputs:  Intake/Output Summary (Last 24 hours) at 18 06  Last data filed at 18 0400   Gross per 24 hour   Intake          1346 12 ml   Output             1520 ml   Net          -173 88 ml     I/O last 24 hours: In: 2250 5 [P O :1200; I V :1050 5]  Out: 5799 [Urine:1850; Chest Tube:420]    UOP: 0 6cc/kg/hour     Chest tube Output:  Mediastinal tubes: 30 mL/8 hours  90 mL/24 hours   Pleural tubes: 70 mL/8 hours  170 mL/24 hours       Labs:    Results from last 7 days  Lab Units 18  0426 18  0424 18  0402  18  0524 18  0431   WBC Thousand/uL 11 42* 11 18* 11 02*  --  10 96* 9 87   HEMOGLOBIN g/dL 8 7* 8 7* 9 6*  < > 11 1* 11 8*   I STAT HEMOGLOBIN   --   --   --   < >  --   --    HEMATOCRIT % 27 4* 26 8* 30 6*  < > 34 6* 36 0*   PLATELETS Thousands/uL 214 178 194  < > 273 286   NEUTROS PCT % 78*  --   --   --  67 69   MONOS PCT % 7  --   --   --  7 7   < > = values in this interval not displayed      Results from last 7 days  Lab Units 18  0426 18  0424 18  0402   SODIUM mmol/L 133* 134* 139   POTASSIUM mmol/L 4 3 4 3 5 0   CHLORIDE mmol/L 100 100 106   CO2 mmol/L 29 30 27   BUN mg/dL 15 13 12   CREATININE mg/dL 0 86 0 70 0 69   CALCIUM mg/dL 7 9* 7 9* 7 6*   GLUCOSE RANDOM mg/dL 126 118 122         Results from last 7 days  Lab Units 03/30/18  0424 03/29/18  0402 03/27/18  0524   MAGNESIUM mg/dL 2 2 2 3 1 9       Results from last 7 days  Lab Units 03/27/18  0524 03/26/18  0431 03/25/18  0544   PHOSPHORUS mg/dL 4 0 4 8* 3 8        Results from last 7 days  Lab Units 03/25/18  0544   INR  1 06   PTT seconds 29                   Micro:   Blood Culture:   Lab Results   Component Value Date    BLOODCX No Growth After 5 Days  03/15/2018    BLOODCX No Growth After 5 Days  03/15/2018     Urine Culture: No results found for: URINECX  Sputum Culture: No components found for: SPUTUMCX  Wound Culure: No results found for: WOUNDCULT    Results from last 7 days  Lab Units 03/24/18  1656   MRSA CULTURE ONLY  No Methicillin Resistant Staphlyococcus aureus (MRSA) isolated       Imaging  No new    Nutrition:        Diet Orders            Start     Ordered    03/29/18 1202  Diet Cardiac; Cardiac TLC 2 3 GM NA; Fluid Restriction 1800 ML, Consistent Carbohydrate Diet Level 2 (5 carb servings/75 grams CHO/meal)  Diet effective 0500     Question Answer Comment   Diet Type Cardiac    Cardiac Cardiac TLC 2 3 GM NA    Other Restriction(s): Fluid Restriction 1800 ML    Other Restriction(s): Consistent Carbohydrate Diet Level 2 (5 carb servings/75 grams CHO/meal)    RD to adjust diet per protocol? No        03/29/18 1201        Physical Exam:    General Appearance:  NAD  HENT: Atraumatic without obvious abnormality  Eyes: without icterus  Cardiac: Regular rate and rhythm, no murmur, no rub  Pulmonary: Clear to auscultation bilaterally    Gastrointestinal: Soft, distention,  nontender to palpation  : Stout present: no              Musculoskeletal: Trace edema bilaterally  Neuro:  Alert and appropriate nonfocal exam     Psych: Mood and affect WNL  Skin: warm and dry  Incisions: Sternum is stable  Incision dressed with Acticoat  No erythema or drainage      Invasive lines and devices:   Invasive Devices     Central Venous Catheter Line            CVC Central Lines 03/28/18 Triple 2 days          Peripheral Intravenous Line            Peripheral IV 03/28/18 Right Antecubital 2 days          Arterial Line            Arterial Line 03/28/18 Right Radial 2 days          Line            Pacer Wires 2 days    Pacer Wires 2 days          Drain            Chest Tube 1 Anterior Mediastinal 32 Fr  2 days    Chest Tube 2 Left Pleural 32 Fr  2 days    Chest Tube 3 Posterior Mediastinal 32 Fr  2 days                Assessment:  Principal Problem:    Triple vessel coronary artery disease  Active Problems:    Anxiety and depression    Diabetic neuropathy, type II diabetes mellitus ([facility])    Hyperlipidemia    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 ([facility])    PAD (peripheral artery disease) ([facility])    Non-healing wound of left heel    Diabetic gastroparesis ([facility])    Bilateral lower extremity edema    Class 2 obesity due to excess calories with serious comorbidity and body mass index (BMI) of 35 0 to 35 9 in adult    Acute on chronic diastolic heart failure (HCC)    S/P CABG x 3    Acute respiratory insufficiency, postoperative    Diabetic autonomic neuropathy associated with type 2 diabetes mellitus ([facility] [address] )      Plan:    Neuro:   · Pain controlled with: PRN percocet  · Regulate sleep/wake cycle  · Trend neuro exam    CV:   · Cardiac infusions: Neosynephrine, 80 mcg/min  · Wean as able  · MAP goal > 65  · Change Arterial line  · Rhythm: NSR  · Follow rhythm on telemetry  · Hold Bblocker while still on ann marie  · Epicardial pacing wires no longer required    Remove today  · Continue PO amio, statin, and ASA therapy    Lung:   · Encourage deep breathing, coughing, and incentive spirometry  · Wean NC as tolerated  · SpO2 goal >92%  · Chest tube output remains persistently high; Continue chest tubes to suction today    GI:   · Continue PPI for stress ulcer prophylaxis  · Continue bowel regimen    FEN:   · Diuretic plan: Lasix 20 mg IV q 12  · K-dur 10 mEQ PO q 12  · Goal 24 hour fluid balance: Neg  · Nutrition/diet plan: Cardiac   · Replete electrolytes with goals: K >4 0, Mag >2 0, and Phos >3 0    :   · Indwelling Stout present: no   · Trend UOP and BUN/creat  · Strict I and O    ID:   · Trend temps and WBC count  · Maintain normothermia    Heme:   · Trend hgb and plts  · Transfuse as needed for goal hgb >7 0    Endo:   · Glycemic control plan: History of diabetes: Endocrine consulted    MSK/Skin:  · Mobility goal: OOB  · PT consult: yes  · OT consult: yes  · Frequent turning and pressure off-loading  · Local wound care as needed    Disposition:  Continue SD1    Code Status: Level 1 - Full Code    Collaborative bedside rounds performed with cardiac surgery attending and bedside RN      SIGNATURE: Magi Schmid PA-C  DATE: March 31, 2018  TIME: 6:28 AM

## 2018-03-31 NOTE — PROGRESS NOTES
Progress Note - Feliberto Marie 61 y o  male MRN: [id number]    Unit/Bed#: [facility] 416-01 Encounter: [id number]      CC: diabetes f/u    Subjective:   Feliberto Mraie is a 61y o  year old male with type 2 diabetes  Feels well  No complaints  No hypoglycemia  He is still requiring pressors and assess on IV insulin therapy  He is getting 10 units of Humalog insulin with each meal to cover mealtime blood sugar excursions  Blood sugars have been mostly in the mid 100s  He reports that at home he has difficulty testing his blood sugars because of difficulty in feeling his fingers and the inability to adequately utilize the strips and blood sugar monitor  He also has some difficulty with affording his test strips and supplies  He is eating well without nausea  Objective:     Vitals: Blood pressure 93/51, pulse 74, temperature 97 9 °F (36 6 °C), temperature source Oral, resp  rate 19, height 6' 1" (1 854 m), weight 113 kg (249 lb 9 oz), SpO2 94 %  ,Body mass index is 32 93 kg/m²  Intake/Output Summary (Last 24 hours) at 03/31/18 1704  Last data filed at 03/31/18 1600   Gross per 24 hour   Intake          1360 67 ml   Output             3375 ml   Net         -2014 33 ml       Physical Exam:  General Appearance: awake, appears stated age and cooperative  Head: Normocephalic, without obvious abnormality, atraumatic  Extremities: moves all extremities, wound on left lower extremity covered with a bandage without any discharge soaking through  1+ edema bilaterally  Skin: Skin color and temperature normal    Pulm: no labored breathing, lungs with some basilar crackles bilaterally  CAR:  Heart rate regular with regular rhythm  Abd:  Abdomen soft with positive bowel sounds  Lab, Imaging and other studies: I have personally reviewed pertinent reports        POC Glucose   Date Value   03/31/2018 126 mg/dl   03/31/2018 135 mg/dl   03/31/2018 132 mg/dl   03/31/2018 146 mg/dl (H)   03/31/2018 172 mg/dl (H)   03/31/2018 112 mg/dl   03/31/2018 135 mg/dl   03/31/2018 133 mg/dl   03/31/2018 124 mg/dl   03/30/2018 138 mg/dl   01/11/2018 130   09/07/2016 84       Assessment:  diabetes with hyperglycemia  Blood sugars under decent control with his IV insulin and Humalog insulin with meals  Plan:  I will continue his IV insulin as long as he is on his pressors  He will continue to have his blood sugars checked per protocol  I will continue his Humalog insulin 10 units with each meal   I discussed with him the possibility of using a freestyle Ashlie at home if he can afford it which may be easier for him with his neurologic symptoms in his fingers  Portions of the record may have been created with voice recognition software

## 2018-03-31 NOTE — PROGRESS NOTES
Procedure: Chest Tube Removal    03/31/18    Mediastinal CT x 2 and pleural CT x 1 d/c'd in typical fashion  Patient tolerated procedure well  No immediate complications  Site dressed with Acticoat dressing  Patient's nurse was made aware  Procedure: Epicardial Wire Removal    03/31/18    Patient was returned to bed  EPW x 2 d/c'd in typical fashion  No immediate complications  Site dressed with dry sterile dressing  Patient and nurse aware of mandatory 1 hour bedrest protocol  Vital signs ordered Q 15 minutes for one hour as per protocol      Krys Milligan PA-C

## 2018-03-31 NOTE — PROGRESS NOTES
Progress Note - Cardiothoracic Surgery   Peterson Moore 61 y o  male MRN: [id number]  Unit/Bed#: [facility] 416-01 Encounter: [id number]      POD # 3 s/p CABG    Pt seen/examined  Interval history and data reviewed with critical care team   Pt doing well  No specific complaints    Continues on [place]    Medications:   Scheduled Meds:    Current Facility-Administered Medications:  acetaminophen 650 mg Oral Q4H PRN Shay Davis PA-C    amiodarone 200 mg Oral Q8H [facility] Shay Davis PA-C    aspirin 325 mg Oral Daily Shay Davis PA-C    atorvastatin 80 mg Oral Daily With NUNO Singh    bisacodyl 10 mg Rectal Daily PRN Donald Elliott PA-C    collagenase  Topical Daily Debbi Grimaldo DPM    docusate sodium 100 mg Oral BID Bonnie Sewell PA-C    fondaparinux 2 5 mg Subcutaneous Daily Shay Davis PA-C    furosemide 20 mg Intravenous BID (diuretic) Bonnie Sewell PA-C    insulin lispro 10 Units Subcutaneous TID With Meals Timothy Rodriguez DO    insulin regular (HumuLIN R,NovoLIN R) infusion 0 3-21 Units/hr Intravenous Titrated Shay Davis PA-C Last Rate: 4 Units/hr (03/31/18 0800)   midodrine 10 mg Oral TID With Meals Bonnie Sewell PA-C    mupirocin 1 application Nasal P69G [facility] Shay Davis PA-C    ondansetron 4 mg Intravenous Q6H PRN Shay Davis PA-C    oxyCODONE-acetaminophen 1 tablet Oral Q4H PRN Donald Elliott PA-C    oxyCODONE-acetaminophen 2 tablet Oral Q6H PRN Shay Davis PA-C    pantoprazole 40 mg Oral Early Morning Shay Davis PA-C    phenylephine  mcg/min Intravenous Titrated Susana Judd PA-C Last Rate: 80 mcg/min (03/31/18 0826)   polyethylene glycol 17 g Oral Daily Shay Davis PA-C    potassium chloride 10 mEq Oral BID Bonnie Sewell PA-C    sodium chloride 20 mL/hr Intravenous Continuous Shay Davis PA-C Last Rate: 10 mL/hr (03/31/18 0800)   tamsulosin 0 4 mg Oral Daily With [employer] Him[employer], NUNO    temazepam 15 mg Oral HS PRN Bonnie Sewell PA-C      Continuous Infusions:    insulin regular (HumuLIN R,NovoLIN R) infusion 0 3-21 Units/hr Last Rate: 4 Units/hr (03/31/18 0800)   phenylephine  mcg/min Last Rate: 80 mcg/min (03/31/18 0826)   sodium chloride 20 mL/hr Last Rate: 10 mL/hr (03/31/18 0800)     PRN Meds:   acetaminophen    bisacodyl    ondansetron    oxyCODONE-acetaminophen    oxyCODONE-acetaminophen    temazepam    Vitals: Blood pressure 93/63, pulse 76, temperature 97 9 °F (36 6 °C), temperature source Oral, resp  rate 20, height 6' 1" (1 854 m), weight 113 kg (249 lb 9 oz), SpO2 98 %  ,Body mass index is 32 93 kg/m²  I/O last 24 hours: In: 1730 1 [P O :960; I V :770 1]  Out: 2040 [Urine:1750;  Chest Tube:290]  Invasive Devices     Central Venous Catheter Line            CVC Central Lines 03/28/18 Triple 3 days          Peripheral Intravenous Line            Peripheral IV 03/28/18 Right Antecubital 3 days          Arterial Line            Arterial Line 03/28/18 Right Radial 3 days          Line            Pacer Wires 2 days    Pacer Wires 2 days          Drain            Chest Tube 1 Anterior Mediastinal 32 Fr  2 days    Chest Tube 2 Left Pleural 32 Fr  2 days    Chest Tube 3 Posterior Mediastinal 32 Fr  2 days                  Lab, Imaging and other studies:     Results from last 7 days  Lab Units 03/31/18  0426 03/30/18  0424 03/29/18  0402   WBC Thousand/uL 11 42* 11 18* 11 02*   HEMOGLOBIN g/dL 8 7* 8 7* 9 6*   HEMATOCRIT % 27 4* 26 8* 30 6*   PLATELETS Thousands/uL 214 178 194       Results from last 7 days  Lab Units 03/31/18  0426 03/30/18  0424 03/29/18  0402   SODIUM mmol/L 133* 134* 139   POTASSIUM mmol/L 4 3 4 3 5 0   CHLORIDE mmol/L 100 100 106   CO2 mmol/L 29 30 27   BUN mg/dL 15 13 12   CREATININE mg/dL 0 86 0 70 0 69   GLUCOSE RANDOM mg/dL 126 118 122   CALCIUM mg/dL 7 9* 7 9* 7 6*       Results from last 7 days  Lab Units 03/25/18  0544   INR  1 06   PTT seconds 29     No results for input(s): PHART, ODE9SEN, PO2ART, NCJ1DRY, N3IVPBCD, BEART in the last 72 hours  Plan:    Wean [place] gtt to off as tolerated  DC Zoya  Transfer to floor  PT/OT  Non-weight bearing LLE - this means wheelchair only in setting of sternotomy    Continue Midodrine      SIGNATURE: Yemi Stallings DO  DATE: March 31, 2018  TIME: 9:42 AM

## 2018-04-01 LAB
ANION GAP SERPL CALCULATED.3IONS-SCNC: 2 MMOL/L (ref 4–13)
BASOPHILS # BLD AUTO: 0.05 THOUSANDS/ΜL (ref 0–0.1)
BASOPHILS NFR BLD AUTO: 1 % (ref 0–1)
BUN SERPL-MCNC: 14 MG/DL (ref 5–25)
CALCIUM SERPL-MCNC: 7.8 MG/DL (ref 8.3–10.1)
CHLORIDE SERPL-SCNC: 103 MMOL/L (ref 100–108)
CO2 SERPL-SCNC: 30 MMOL/L (ref 21–32)
CREAT SERPL-MCNC: 0.94 MG/DL (ref 0.6–1.3)
EOSINOPHIL # BLD AUTO: 0.43 THOUSAND/ΜL (ref 0–0.61)
EOSINOPHIL NFR BLD AUTO: 4 % (ref 0–6)
ERYTHROCYTE [DISTWIDTH] IN BLOOD BY AUTOMATED COUNT: 14.5 % (ref 11.6–15.1)
GFR SERPL CREATININE-BSD FRML MDRD: 89 ML/MIN/1.73SQ M
GLUCOSE SERPL-MCNC: 109 MG/DL (ref 65–140)
GLUCOSE SERPL-MCNC: 110 MG/DL (ref 65–140)
GLUCOSE SERPL-MCNC: 114 MG/DL (ref 65–140)
GLUCOSE SERPL-MCNC: 114 MG/DL (ref 65–140)
GLUCOSE SERPL-MCNC: 121 MG/DL (ref 65–140)
GLUCOSE SERPL-MCNC: 127 MG/DL (ref 65–140)
GLUCOSE SERPL-MCNC: 136 MG/DL (ref 65–140)
GLUCOSE SERPL-MCNC: 142 MG/DL (ref 65–140)
GLUCOSE SERPL-MCNC: 148 MG/DL (ref 65–140)
GLUCOSE SERPL-MCNC: 150 MG/DL (ref 65–140)
GLUCOSE SERPL-MCNC: 150 MG/DL (ref 65–140)
GLUCOSE SERPL-MCNC: 161 MG/DL (ref 65–140)
GLUCOSE SERPL-MCNC: 186 MG/DL (ref 65–140)
GLUCOSE SERPL-MCNC: 93 MG/DL (ref 65–140)
HCT VFR BLD AUTO: 26.9 % (ref 36.5–49.3)
HGB BLD-MCNC: 8.3 G/DL (ref 12–17)
LYMPHOCYTES # BLD AUTO: 1.92 THOUSANDS/ΜL (ref 0.6–4.47)
LYMPHOCYTES NFR BLD AUTO: 19 % (ref 14–44)
MCH RBC QN AUTO: 27.4 PG (ref 26.8–34.3)
MCHC RBC AUTO-ENTMCNC: 30.9 G/DL (ref 31.4–37.4)
MCV RBC AUTO: 89 FL (ref 82–98)
MONOCYTES # BLD AUTO: 0.76 THOUSAND/ΜL (ref 0.17–1.22)
MONOCYTES NFR BLD AUTO: 8 % (ref 4–12)
NEUTROPHILS # BLD AUTO: 6.89 THOUSANDS/ΜL (ref 1.85–7.62)
NEUTS SEG NFR BLD AUTO: 68 % (ref 43–75)
NRBC BLD AUTO-RTO: 0 /100 WBCS
PLATELET # BLD AUTO: 248 THOUSANDS/UL (ref 149–390)
PMV BLD AUTO: 9.3 FL (ref 8.9–12.7)
POTASSIUM SERPL-SCNC: 4 MMOL/L (ref 3.5–5.3)
RBC # BLD AUTO: 3.03 MILLION/UL (ref 3.88–5.62)
SODIUM SERPL-SCNC: 135 MMOL/L (ref 136–145)
WBC # BLD AUTO: 10.07 THOUSAND/UL (ref 4.31–10.16)

## 2018-04-01 PROCEDURE — 85025 COMPLETE CBC W/AUTO DIFF WBC: CPT | Performed by: THORACIC SURGERY (CARDIOTHORACIC VASCULAR SURGERY)

## 2018-04-01 PROCEDURE — 99233 SBSQ HOSP IP/OBS HIGH 50: CPT | Performed by: EMERGENCY MEDICINE

## 2018-04-01 PROCEDURE — 82948 REAGENT STRIP/BLOOD GLUCOSE: CPT

## 2018-04-01 PROCEDURE — 99231 SBSQ HOSP IP/OBS SF/LOW 25: CPT | Performed by: INTERNAL MEDICINE

## 2018-04-01 PROCEDURE — 99024 POSTOP FOLLOW-UP VISIT: CPT | Performed by: THORACIC SURGERY (CARDIOTHORACIC VASCULAR SURGERY)

## 2018-04-01 PROCEDURE — 80048 BASIC METABOLIC PNL TOTAL CA: CPT | Performed by: THORACIC SURGERY (CARDIOTHORACIC VASCULAR SURGERY)

## 2018-04-01 RX ORDER — INSULIN GLARGINE 100 [IU]/ML
12 INJECTION, SOLUTION SUBCUTANEOUS
Status: DISCONTINUED | OUTPATIENT
Start: 2018-04-01 | End: 2018-04-04 | Stop reason: HOSPADM

## 2018-04-01 RX ORDER — LORAZEPAM 0.5 MG/1
0.5 TABLET ORAL ONCE
Status: COMPLETED | OUTPATIENT
Start: 2018-04-01 | End: 2018-04-01

## 2018-04-01 RX ORDER — FUROSEMIDE 10 MG/ML
40 INJECTION INTRAMUSCULAR; INTRAVENOUS ONCE
Status: COMPLETED | OUTPATIENT
Start: 2018-04-01 | End: 2018-04-01

## 2018-04-01 RX ADMIN — FUROSEMIDE 20 MG: 10 INJECTION, SOLUTION INTRAMUSCULAR; INTRAVENOUS at 15:15

## 2018-04-01 RX ADMIN — Medication 2 UNITS/HR: at 05:39

## 2018-04-01 RX ADMIN — PANTOPRAZOLE SODIUM 40 MG: 40 TABLET, DELAYED RELEASE ORAL at 05:46

## 2018-04-01 RX ADMIN — AMIODARONE HYDROCHLORIDE 200 MG: 200 TABLET ORAL at 14:40

## 2018-04-01 RX ADMIN — FUROSEMIDE 40 MG: 10 INJECTION, SOLUTION INTRAMUSCULAR; INTRAVENOUS at 18:41

## 2018-04-01 RX ADMIN — INSULIN LISPRO 10 UNITS: 100 INJECTION, SOLUTION INTRAVENOUS; SUBCUTANEOUS at 16:19

## 2018-04-01 RX ADMIN — POTASSIUM CHLORIDE 10 MEQ: 750 TABLET, EXTENDED RELEASE ORAL at 17:12

## 2018-04-01 RX ADMIN — FUROSEMIDE 20 MG: 10 INJECTION, SOLUTION INTRAMUSCULAR; INTRAVENOUS at 08:12

## 2018-04-01 RX ADMIN — ATORVASTATIN CALCIUM 80 MG: 80 TABLET, FILM COATED ORAL at 16:18

## 2018-04-01 RX ADMIN — TAMSULOSIN HYDROCHLORIDE 0.4 MG: 0.4 CAPSULE ORAL at 16:18

## 2018-04-01 RX ADMIN — PHENYLEPHRINE HYDROCHLORIDE 50 MCG/MIN: 10 INJECTION INTRAVENOUS at 00:39

## 2018-04-01 RX ADMIN — MUPIROCIN 1 APPLICATION: 20 OINTMENT TOPICAL at 21:11

## 2018-04-01 RX ADMIN — INSULIN LISPRO 10 UNITS: 100 INJECTION, SOLUTION INTRAVENOUS; SUBCUTANEOUS at 08:13

## 2018-04-01 RX ADMIN — POTASSIUM CHLORIDE 10 MEQ: 750 TABLET, EXTENDED RELEASE ORAL at 08:12

## 2018-04-01 RX ADMIN — COLLAGENASE SANTYL: 250 OINTMENT TOPICAL at 12:41

## 2018-04-01 RX ADMIN — AMIODARONE HYDROCHLORIDE 200 MG: 200 TABLET ORAL at 21:10

## 2018-04-01 RX ADMIN — MIDODRINE HYDROCHLORIDE 10 MG: 5 TABLET ORAL at 11:57

## 2018-04-01 RX ADMIN — INSULIN LISPRO 10 UNITS: 100 INJECTION, SOLUTION INTRAVENOUS; SUBCUTANEOUS at 11:57

## 2018-04-01 RX ADMIN — LORAZEPAM 0.5 MG: 0.5 TABLET ORAL at 21:10

## 2018-04-01 RX ADMIN — MIDODRINE HYDROCHLORIDE 10 MG: 5 TABLET ORAL at 08:12

## 2018-04-01 RX ADMIN — MIDODRINE HYDROCHLORIDE 10 MG: 5 TABLET ORAL at 16:18

## 2018-04-01 RX ADMIN — MUPIROCIN 1 APPLICATION: 20 OINTMENT TOPICAL at 08:12

## 2018-04-01 RX ADMIN — ASPIRIN 325 MG: 325 TABLET ORAL at 08:12

## 2018-04-01 RX ADMIN — FONDAPARINUX SODIUM 2.5 MG: 2.5 INJECTION, SOLUTION SUBCUTANEOUS at 08:12

## 2018-04-01 RX ADMIN — AMIODARONE HYDROCHLORIDE 200 MG: 200 TABLET ORAL at 05:46

## 2018-04-01 NOTE — PROGRESS NOTES
Progress Note - Yakov Kitchen 61 y o  male MRN: [id number]    Unit/Bed#: [facility] 416-01 Encounter: [id number]      CC: diabetes f/u    Subjective:   Yakov Kitchen is a 61y o  year old male with type 2 diabetes  Feels well  No complaints  No hypoglycemia  Off pressors since 1300  Insulin drip now down to 0 5 u/hr  BP about the same off pressors so far  Eating well  Objective:     Vitals: Blood pressure 105/51, pulse 72, temperature 98 1 °F (36 7 °C), temperature source Oral, resp  rate (!) 24, height 6' 1" (1 854 m), weight 113 kg (249 lb 9 oz), SpO2 95 %  ,Body mass index is 32 93 kg/m²  Intake/Output Summary (Last 24 hours) at 04/01/18 1531  Last data filed at 04/01/18 1400   Gross per 24 hour   Intake          1594 28 ml   Output             1445 ml   Net           149 28 ml       Physical Exam:  General Appearance: awake, appears stated age and cooperative  Head: Normocephalic, without obvious abnormality, atraumatic  Extremities: moves all extremities  Skin: Skin color and temperature normal    Pulm: no labored breathing, lungs clear        POC Glucose   Date Value   04/01/2018 150 mg/dl (H)   04/01/2018 93 mg/dl   04/01/2018 136 mg/dl   04/01/2018 186 mg/dl (H)   04/01/2018 148 mg/dl (H)   04/01/2018 150 mg/dl (H)   04/01/2018 127 mg/dl   04/01/2018 142 mg/dl (H)   04/01/2018 114 mg/dl   03/31/2018 130 mg/dl   01/11/2018 130   09/07/2016 84       Assessment:  diabetes with hyperglycemia, now off pressors    Plan: Will discontinue insulin drip later tonite and start Lantus 12 units daily now that he is off pressors  Will continue 10 units humalog insulin with each meal with a sliding scale  Portions of the record may have been created with voice recognition software

## 2018-04-01 NOTE — PROGRESS NOTES
Patient refusing to wear CPAP  Yadi Almeida PA-C notified and aware  Patient is okay with using nasal cannula  No further orders at this time  Will continue to monitor patient

## 2018-04-01 NOTE — PROGRESS NOTES
Progress Note - Critical Care   Tri Valera 61 y o  male MRN: [id number]  Unit/Bed#: [facility] 416-01 Encounter: [id number]    Attending Physician: Ranulfo White MD    24 Hour Events: POD # 4 s/p CABG x 3  Continues to require ann marie  Had another episode of urinary retention and jacobs was replaced  Allergies:    Allergies   Allergen Reactions    Metformin        Medications:   Scheduled Meds:  Current Facility-Administered Medications:  acetaminophen 650 mg Oral Q4H PRN Shay Davis PA-C    amiodarone 200 mg Oral Q8H [address] Shay Davis PA-C    aspirin 325 mg Oral Daily Shay Davis PA-C    atorvastatin 80 mg Oral Daily With NUNO Singh    bisacodyl 10 mg Rectal Daily PRN Jaun Barrientos PA-C    collagenase  Topical Daily Debbi Grimaldo DPM    docusate sodium 100 mg Oral BID Bonnie Sewell PA-C    fondaparinux 2 5 mg Subcutaneous Daily Shay Davis PA-C    furosemide 20 mg Intravenous BID (diuretic) Bonnie Sewell PA-C    insulin lispro 10 Units Subcutaneous TID With Meals Baron Lower, DO    insulin regular (HumuLIN R,NovoLIN R) infusion 0 3-21 Units/hr Intravenous Titrated Dahiana Davis PA-C Last Rate: 4 Units/hr (04/01/18 0616)   midodrine 10 mg Oral TID With Meals Bonnie Sewell PA-C    mupirocin 1 application Nasal R92C [address] Shay Davis PA-C    ondansetron 4 mg Intravenous Q6H PRN Shay Davis PA-C    oxyCODONE-acetaminophen 1 tablet Oral Q4H PRN Jaun Barrientos PA-C    oxyCODONE-acetaminophen 2 tablet Oral Q6H PRN Shay Davis PA-C    pantoprazole 40 mg Oral Early Morning Shay Davis PA-C    phenylephine  mcg/min Intravenous Titrated Agnes Fermin PA-C Last Rate: 30 mcg/min (04/01/18 0415)   polyethylene glycol 17 g Oral Daily Shay Davis PA-C    potassium chloride 10 mEq Oral BID Bonnie Sewell PA-C    sodium chloride 20 mL/hr Intravenous Continuous Dahiana Davis PA-C Last Rate: 10 mL/hr (03/31/18 0800)   tamsulosin 0 4 mg Oral Daily With [employer] A NUNO Sewell    temazepam 15 mg Oral HS PRN Sudha Sewell PA-C        VTE Pharmacologic Prophylaxis: Fondaparinux (Arixtra)  VTE Mechanical Prophylaxis: sequential compression device    Continuous Infusions:  insulin regular (HumuLIN R,NovoLIN R) infusion 0 3-21 Units/hr Last Rate: 4 Units/hr (04/01/18 0616)   phenylephine  mcg/min Last Rate: 30 mcg/min (04/01/18 0415)   sodium chloride 20 mL/hr Last Rate: 10 mL/hr (03/31/18 0800)     PRN Meds:    acetaminophen 650 mg Q4H PRN   bisacodyl 10 mg Daily PRN   ondansetron 4 mg Q6H PRN   oxyCODONE-acetaminophen 1 tablet Q4H PRN   oxyCODONE-acetaminophen 2 tablet Q6H PRN   temazepam 15 mg HS PRN       Home Medications:   Prior to Admission medications    Medication Sig Start Date End Date Taking?  Authorizing Provider   atorvastatin (LIPITOR) 40 mg tablet Take 1 tablet (40 mg total) by mouth daily 2/20/18   Theresa Padilla DO   insulin aspart protamine-insulin aspart (NOVOLOG MIX 70/30 FLEXPEN) 100 Units/mL SUPN Inject under the skin Twice daily 2/11/15   Historical Provider, MD   LANTUS SOLOSTAR injection pen 100 units/mL Inject 0 8 mL (80 Units total) under the skin 2 (two) times a day 2/20/18   Theresa Padilla DO   LORazepam (ATIVAN) 0 5 mg tablet Take by mouth every 8 (eight) hours as needed for anxiety    Historical Provider, MD   metoclopramide (REGLAN) 5 mg tablet Take 5 mg by mouth daily      Historical Provider, MD   midodrine (PROAMATINE) 5 mg tablet Take 1 tablet (5 mg total) by mouth 3 (three) times a day 2/20/18   Theresa Padilla DO   torsemide [facility]) 10 mg tablet Take 1 tablet (10 mg total) by mouth daily 2/21/18   Theresa Padilla DO       Vitals:   Vitals:    04/01/18 0000 04/01/18 0200 04/01/18 0400 04/01/18 0600   BP: 117/54 106/54 100/53 (!) 94/49   BP Location: Left arm  Left arm    Pulse: 72 72 68 62   Resp: 15 20 16 15   Temp: 98 2 °F (36 8 °C)  98 4 °F (36 9 °C)    TempSrc: Probe  Oral    SpO2: 96% 97% 97% 99%   Weight:    113 kg (249 lb 9 oz)   Height:         Arterial Line BP: 80/74  Arterial Line MAP (mmHg): 76 mmHg    Tele Rhythm: NSR This was personally reviewed by myself  Respiratory:  SpO2: SpO2: 99 %  O2 Flow Rate (L/min): 2 L/min    Temperature: Temp (24hrs), Av 2 °F (36 8 °C), Min:97 9 °F (36 6 °C), Max:98 4 °F (36 9 °C)  Current: Temperature: 98 4 °F (36 9 °C)    Weights:   Weight (last 2 days)     Date/Time   Weight    18  113 (249 56)    18 06  113 (249 56)    18 06  115 (253 75)            IBW: 79 9 kg  Body mass index is 32 93 kg/m²  Hemodynamic Monitoring:  N/A       Intake and Outputs:  Intake/Output Summary (Last 24 hours) at 18 0648  Last data filed at 18 06   Gross per 24 hour   Intake          1571 11 ml   Output             2495 ml   Net          -923 89 ml     I/O last 24 hours: In: 2071 2 [P O :880; I V :1191 2]  Out: 5850 [Urine:3315; Chest Tube:150]    UOP: /hour     Labs:    Results from last 7 days  Lab Units 18  0524   WBC Thousand/uL 10 07 11 42* 11 18*  < > 10 96*   HEMOGLOBIN g/dL 8 3* 8 7* 8 7*  < > 11 1*   I STAT HEMOGLOBIN   --   --   --   < >  --    HEMATOCRIT % 26 9* 27 4* 26 8*  < > 34 6*   PLATELETS Thousands/uL 248 214 178  < > 273   NEUTROS PCT % 68 78*  --   --  67   MONOS PCT % 8 7  --   --  7   < > = values in this interval not displayed      Results from last 7 days  Lab Units 18  042   SODIUM mmol/L 135* 133* 134*   POTASSIUM mmol/L 4 0 4 3 4 3   CHLORIDE mmol/L 103 100 100   CO2 mmol/L 30 29 30   BUN mg/dL 14 15 13   CREATININE mg/dL 0 94 0 86 0 70   CALCIUM mg/dL 7 8* 7 9* 7 9*   GLUCOSE RANDOM mg/dL 114 126 118       Results from last 7 days  Lab Units 18  0424 18  0402 18  0524   MAGNESIUM mg/dL 2 2 2 3 1 9       Results from last 7 days  Lab Units 18  0524 18  0431   PHOSPHORUS mg/dL 4 0 4 8*                        Micro:   Blood Culture:   Lab Results   Component Value Date    BLOODCX No Growth After 5 Days  03/15/2018    BLOODCX No Growth After 5 Days  03/15/2018     Urine Culture: No results found for: URINECX  Sputum Culture: No components found for: SPUTUMCX  Wound Culure: No results found for: WOUNDCULT        Imaging  No new    Nutrition:        Diet Orders            Start     Ordered    03/29/18 1202  Diet Cardiac; Cardiac TLC 2 3 GM NA; Fluid Restriction 1800 ML, Consistent Carbohydrate Diet Level 2 (5 carb servings/75 grams CHO/meal)  Diet effective 0500     Question Answer Comment   Diet Type Cardiac    Cardiac Cardiac TLC 2 3 GM NA    Other Restriction(s): Fluid Restriction 1800 ML    Other Restriction(s): Consistent Carbohydrate Diet Level 2 (5 carb servings/75 grams CHO/meal)    RD to adjust diet per protocol? No        03/29/18 1201          Physical Exam:    General Appearance:  NAD  HENT: Atraumatic without obvious abnormality  Eyes: without icterus  Cardiac: Regular rate and rhythm, No murmur, No rub  Pulmonary: Clear to auscultation bilaterally    Gastrointestinal: Soft, distention,  Nontender  : Stout present: yes   Musculoskeletal: Trace edema bilaterally  Neuro:  Alert and appropriate nonfocal exam     Psych: Mood and affect WNL  Skin: warm and dry  Incisions: Sternum is stable  Incision is clean, dry, and intact  Invasive lines and devices:   Invasive Devices     Central Venous Catheter Line            CVC Central Lines 03/28/18 Triple 3 days          Drain            Urethral Catheter Temperature probe 16 Fr  less than 1 day                Assessment:  Principal Problem:    Triple vessel coronary artery disease  Active Problems:    Anxiety and depression    Diabetic neuropathy, type II diabetes mellitus ([address] )    Hyperlipidemia    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 (HCC)    PAD (peripheral artery disease) (HCC)    Non-healing wound of left heel    Diabetic gastroparesis (HCC)    Bilateral lower extremity edema    Class 2 obesity due to excess calories with serious comorbidity and body mass index (BMI) of 35 0 to 35 9 in adult    Acute on chronic diastolic heart failure (HCC)    S/P CABG x 3    Acute respiratory insufficiency, postoperative    Diabetic autonomic neuropathy associated with type 2 diabetes mellitus ([facility] [address] )      Plan:    Neuro:   · Pain controlled with: PRN Percocet  · Regulate sleep/wake cycle  · Trend neuro exam    CV:   · Cardiac infusions: Neosynephrine, 30 mcg/min  · Wean as able  · MAP goal > 65  · Rhythm: NSR  · Follow rhythm on telemetry  · Hold BBlocker  · Epicardial pacing wires out  · Continue PO amio, statin, and ASA therapy    Lung:   · Encourage deep breathing, coughing, and incentive spirometry  · Wean NC as tolerated  · SpO2 goal >92%  · Chest tubes have been discontinued    GI:   · Continue PPI for stress ulcer prophylaxis  · Continue bowel regimen    FEN:   · Diuretic plan: Lasix 20 mg IV q 12  · K-dur 10 mEQ PO q 12  · Goal 24 hour fluid balance: Neg  · Nutrition/diet plan: Cardiac  · Replete electrolytes with goals: K >4 0, Mag >2 0, and Phos >3 0    :   · Indwelling Stout present: yes   · Keep Stout as failed urinary retention protocol, flomax has been started however this may be secondary to diabetic process   · Trend UOP and BUN/creat  · Strict I and O    ID:   · Trend temps and WBC count  · Maintain normothermia    Heme:   · Trend hgb and plts  · Transfuse as needed for goal hgb >7 0    Endo:   · Glycemic control plan: Endocrine consulted    MSK/Skin:  · Mobility goal: OOB  · PT consult: yes  · OT consult: yes  · Frequent turning and pressure off-loading  · Local wound care as needed    Disposition:  Continue SD1 care     Code Status: Level 1 - Full Code    Collaborative bedside rounds performed with cardiac surgery attending and bedside YI Tanner PA-C  DATE: April 1, 2018  TIME: 6:48 AM

## 2018-04-01 NOTE — PROGRESS NOTES
Progress Note - Cardiothoracic Surgery   Barton Doreen 61 y o  male MRN: [id number]  Unit/Bed#: [facility] 416-01 Encounter: [id number]      POD # 4 s/p CABG    Pt seen/examined  Interval history and data reviewed with critical care team   Pt doing well  No specific complaints    Continues on [facility] overnight    Medications:   Scheduled Meds:    Current Facility-Administered Medications:  acetaminophen 650 mg Oral Q4H PRN Shay Davis PA-C    amiodarone 200 mg Oral Q8H [address] Shay Davis PA-C    aspirin 325 mg Oral Daily Shay Davis PA-C    atorvastatin 80 mg Oral Daily With NUNO Singh    bisacodyl 10 mg Rectal Daily PRN Mihaela Tavares PA-C    collagenase  Topical Daily Debbi Grimaldo DPM    docusate sodium 100 mg Oral BID Bonnie Sewell PA-C    fondaparinux 2 5 mg Subcutaneous Daily Shya Davis PA-C    furosemide 20 mg Intravenous BID (diuretic) Bonnie Sewell PA-C    insulin lispro 10 Units Subcutaneous TID With Meals Madhavi Dsouza DO    insulin regular (HumuLIN R,NovoLIN R) infusion 0 3-21 Units/hr Intravenous Titrated Shay Davis PA-C Last Rate: 4 Units/hr (04/01/18 0800)   midodrine 10 mg Oral TID With Meals Bonnie Sewell PA-C    mupirocin 1 application Nasal [id number] Shay Davis PA-C    ondansetron 4 mg Intravenous Q6H PRN Shay Davis PA-C    oxyCODONE-acetaminophen 1 tablet Oral Q4H PRN Mihaela Tavares PA-C    oxyCODONE-acetaminophen 2 tablet Oral Q6H PRN Shay Davis PA-C    pantoprazole 40 mg Oral Early Morning Shay Davis PA-C    phenylephine  mcg/min Intravenous Titrated Freddy Sky PA-C Last Rate: 50 mcg/min (04/01/18 0912)   polyethylene glycol 17 g Oral Daily Shay Davis PA-C    potassium chloride 10 mEq Oral BID Bonnie Sewell PA-C    sodium chloride 20 mL/hr Intravenous Continuous Shay Davis PA-C Last Rate: 10 mL/hr (04/01/18 0800)   tamsulosin 0 4 mg Oral Daily With [employer] A NUNO Sewell    temazepam 15 mg Oral HS PRN Bonnie Sewell PA-C      Continuous Infusions:    insulin regular (HumuLIN R,NovoLIN R) infusion 0 3-21 Units/hr Last Rate: 4 Units/hr (04/01/18 0800)   phenylephine  mcg/min Last Rate: 50 mcg/min (04/01/18 0912)   sodium chloride 20 mL/hr Last Rate: 10 mL/hr (04/01/18 0800)     PRN Meds:   acetaminophen    bisacodyl    ondansetron    oxyCODONE-acetaminophen    oxyCODONE-acetaminophen    temazepam    Vitals: Blood pressure (!) 83/48, pulse 74, temperature 98 1 °F (36 7 °C), temperature source Oral, resp  rate (!) 28, height 6' 1" (1 854 m), weight 113 kg (249 lb 9 oz), SpO2 98 %  ,Body mass index is 32 93 kg/m²  I/O last 24 hours: In: 1963 3 [P O :1220; I V :743 3]  Out: 1007 [Urine:2525; Chest Tube:30]  Invasive Devices     Central Venous Catheter Line            CVC Central Lines 03/28/18 Triple 4 days          Drain            Urethral Catheter Temperature probe 16 Fr  less than 1 day                  Lab, Imaging and other studies:     Results from last 7 days  Lab Units 04/01/18 0424 03/31/18 0426 03/30/18  0424   WBC Thousand/uL 10 07 11 42* 11 18*   HEMOGLOBIN g/dL 8 3* 8 7* 8 7*   HEMATOCRIT % 26 9* 27 4* 26 8*   PLATELETS Thousands/uL 248 214 178       Results from last 7 days  Lab Units 04/01/18 0424 03/31/18 0426 03/30/18  0424   SODIUM mmol/L 135* 133* 134*   POTASSIUM mmol/L 4 0 4 3 4 3   CHLORIDE mmol/L 103 100 100   CO2 mmol/L 30 29 30   BUN mg/dL 14 15 13   CREATININE mg/dL 0 94 0 86 0 70   GLUCOSE RANDOM mg/dL 114 126 118   CALCIUM mg/dL 7 8* 7 9* 7 9*         No results for input(s): PHART, [id number], PO2ART, MCH8VAG, [id number], BEART in the last 72 hours          Plan:    Wean [place] gtt to off today  Continue Midodrine TID  Rehab eval    SIGNATURE: Cha Vo,   DATE: April 1, 2018  TIME: 9:30 AM

## 2018-04-02 ENCOUNTER — TELEPHONE (OUTPATIENT)
Dept: UROLOGY | Facility: AMBULATORY SURGERY CENTER | Age: 58
End: 2018-04-02

## 2018-04-02 PROBLEM — E87.1 HYPONATREMIA: Status: ACTIVE | Noted: 2018-04-02

## 2018-04-02 PROBLEM — J95.89 ACUTE RESPIRATORY INSUFFICIENCY, POSTOPERATIVE: Status: RESOLVED | Noted: 2018-03-28 | Resolved: 2018-04-02

## 2018-04-02 LAB
ANION GAP SERPL CALCULATED.3IONS-SCNC: 3 MMOL/L (ref 4–13)
BASOPHILS # BLD AUTO: 0.04 THOUSANDS/ΜL (ref 0–0.1)
BASOPHILS NFR BLD AUTO: 1 % (ref 0–1)
BUN SERPL-MCNC: 15 MG/DL (ref 5–25)
CALCIUM SERPL-MCNC: 8.1 MG/DL (ref 8.3–10.1)
CHLORIDE SERPL-SCNC: 99 MMOL/L (ref 100–108)
CO2 SERPL-SCNC: 31 MMOL/L (ref 21–32)
CREAT SERPL-MCNC: 0.94 MG/DL (ref 0.6–1.3)
EOSINOPHIL # BLD AUTO: 0.4 THOUSAND/ΜL (ref 0–0.61)
EOSINOPHIL NFR BLD AUTO: 5 % (ref 0–6)
ERYTHROCYTE [DISTWIDTH] IN BLOOD BY AUTOMATED COUNT: 14.5 % (ref 11.6–15.1)
GFR SERPL CREATININE-BSD FRML MDRD: 89 ML/MIN/1.73SQ M
GLUCOSE SERPL-MCNC: 143 MG/DL (ref 65–140)
GLUCOSE SERPL-MCNC: 148 MG/DL (ref 65–140)
GLUCOSE SERPL-MCNC: 167 MG/DL (ref 65–140)
GLUCOSE SERPL-MCNC: 170 MG/DL (ref 65–140)
GLUCOSE SERPL-MCNC: 179 MG/DL (ref 65–140)
GLUCOSE SERPL-MCNC: 192 MG/DL (ref 65–140)
HCT VFR BLD AUTO: 26 % (ref 36.5–49.3)
HGB BLD-MCNC: 8.2 G/DL (ref 12–17)
LYMPHOCYTES # BLD AUTO: 1.63 THOUSANDS/ΜL (ref 0.6–4.47)
LYMPHOCYTES NFR BLD AUTO: 20 % (ref 14–44)
MAGNESIUM SERPL-MCNC: 2.1 MG/DL (ref 1.6–2.6)
MCH RBC QN AUTO: 27.9 PG (ref 26.8–34.3)
MCHC RBC AUTO-ENTMCNC: 31.5 G/DL (ref 31.4–37.4)
MCV RBC AUTO: 88 FL (ref 82–98)
MONOCYTES # BLD AUTO: 0.7 THOUSAND/ΜL (ref 0.17–1.22)
MONOCYTES NFR BLD AUTO: 9 % (ref 4–12)
NEUTROPHILS # BLD AUTO: 5.21 THOUSANDS/ΜL (ref 1.85–7.62)
NEUTS SEG NFR BLD AUTO: 65 % (ref 43–75)
NRBC BLD AUTO-RTO: 0 /100 WBCS
PLATELET # BLD AUTO: 258 THOUSANDS/UL (ref 149–390)
PMV BLD AUTO: 9.5 FL (ref 8.9–12.7)
POTASSIUM SERPL-SCNC: 4.1 MMOL/L (ref 3.5–5.3)
RBC # BLD AUTO: 2.94 MILLION/UL (ref 3.88–5.62)
SODIUM SERPL-SCNC: 133 MMOL/L (ref 136–145)
WBC # BLD AUTO: 8 THOUSAND/UL (ref 4.31–10.16)

## 2018-04-02 PROCEDURE — 82948 REAGENT STRIP/BLOOD GLUCOSE: CPT

## 2018-04-02 PROCEDURE — 80048 BASIC METABOLIC PNL TOTAL CA: CPT | Performed by: PHYSICIAN ASSISTANT

## 2018-04-02 PROCEDURE — 97110 THERAPEUTIC EXERCISES: CPT

## 2018-04-02 PROCEDURE — 99232 SBSQ HOSP IP/OBS MODERATE 35: CPT | Performed by: ANESTHESIOLOGY

## 2018-04-02 PROCEDURE — 97116 GAIT TRAINING THERAPY: CPT

## 2018-04-02 PROCEDURE — 99024 POSTOP FOLLOW-UP VISIT: CPT | Performed by: THORACIC SURGERY (CARDIOTHORACIC VASCULAR SURGERY)

## 2018-04-02 PROCEDURE — 97530 THERAPEUTIC ACTIVITIES: CPT

## 2018-04-02 PROCEDURE — 99254 IP/OBS CNSLTJ NEW/EST MOD 60: CPT | Performed by: UROLOGY

## 2018-04-02 PROCEDURE — 83735 ASSAY OF MAGNESIUM: CPT | Performed by: PHYSICIAN ASSISTANT

## 2018-04-02 PROCEDURE — 85025 COMPLETE CBC W/AUTO DIFF WBC: CPT | Performed by: PHYSICIAN ASSISTANT

## 2018-04-02 PROCEDURE — 97535 SELF CARE MNGMENT TRAINING: CPT

## 2018-04-02 RX ORDER — FUROSEMIDE 10 MG/ML
20 INJECTION INTRAMUSCULAR; INTRAVENOUS
Status: DISCONTINUED | OUTPATIENT
Start: 2018-04-02 | End: 2018-04-02

## 2018-04-02 RX ORDER — LORAZEPAM 0.5 MG/1
0.5 TABLET ORAL EVERY 8 HOURS PRN
Status: DISCONTINUED | OUTPATIENT
Start: 2018-04-02 | End: 2018-04-04 | Stop reason: HOSPADM

## 2018-04-02 RX ORDER — FUROSEMIDE 10 MG/ML
20 INJECTION INTRAMUSCULAR; INTRAVENOUS
Status: DISCONTINUED | OUTPATIENT
Start: 2018-04-02 | End: 2018-04-04 | Stop reason: HOSPADM

## 2018-04-02 RX ADMIN — MIDODRINE HYDROCHLORIDE 10 MG: 5 TABLET ORAL at 16:09

## 2018-04-02 RX ADMIN — AMIODARONE HYDROCHLORIDE 200 MG: 200 TABLET ORAL at 22:29

## 2018-04-02 RX ADMIN — ASPIRIN 325 MG: 325 TABLET ORAL at 08:38

## 2018-04-02 RX ADMIN — INSULIN LISPRO 1 UNITS: 100 INJECTION, SOLUTION INTRAVENOUS; SUBCUTANEOUS at 16:14

## 2018-04-02 RX ADMIN — FUROSEMIDE 20 MG: 10 INJECTION, SOLUTION INTRAMUSCULAR; INTRAVENOUS at 04:45

## 2018-04-02 RX ADMIN — INSULIN LISPRO 1 UNITS: 100 INJECTION, SOLUTION INTRAVENOUS; SUBCUTANEOUS at 22:28

## 2018-04-02 RX ADMIN — POTASSIUM CHLORIDE 10 MEQ: 750 TABLET, EXTENDED RELEASE ORAL at 18:13

## 2018-04-02 RX ADMIN — AMIODARONE HYDROCHLORIDE 200 MG: 200 TABLET ORAL at 14:16

## 2018-04-02 RX ADMIN — INSULIN LISPRO 10 UNITS: 100 INJECTION, SOLUTION INTRAVENOUS; SUBCUTANEOUS at 16:14

## 2018-04-02 RX ADMIN — INSULIN LISPRO 10 UNITS: 100 INJECTION, SOLUTION INTRAVENOUS; SUBCUTANEOUS at 12:18

## 2018-04-02 RX ADMIN — POTASSIUM CHLORIDE 10 MEQ: 750 TABLET, EXTENDED RELEASE ORAL at 08:37

## 2018-04-02 RX ADMIN — TAMSULOSIN HYDROCHLORIDE 0.4 MG: 0.4 CAPSULE ORAL at 16:10

## 2018-04-02 RX ADMIN — MUPIROCIN 1 APPLICATION: 20 OINTMENT TOPICAL at 08:37

## 2018-04-02 RX ADMIN — MIDODRINE HYDROCHLORIDE 10 MG: 5 TABLET ORAL at 12:16

## 2018-04-02 RX ADMIN — PANTOPRAZOLE SODIUM 40 MG: 40 TABLET, DELAYED RELEASE ORAL at 06:09

## 2018-04-02 RX ADMIN — COLLAGENASE SANTYL 1 APPLICATION: 250 OINTMENT TOPICAL at 08:40

## 2018-04-02 RX ADMIN — AMIODARONE HYDROCHLORIDE 200 MG: 200 TABLET ORAL at 06:09

## 2018-04-02 RX ADMIN — INSULIN LISPRO 1 UNITS: 100 INJECTION, SOLUTION INTRAVENOUS; SUBCUTANEOUS at 06:15

## 2018-04-02 RX ADMIN — DOCUSATE SODIUM 100 MG: 100 CAPSULE, LIQUID FILLED ORAL at 08:37

## 2018-04-02 RX ADMIN — INSULIN LISPRO 10 UNITS: 100 INJECTION, SOLUTION INTRAVENOUS; SUBCUTANEOUS at 08:39

## 2018-04-02 RX ADMIN — ATORVASTATIN CALCIUM 80 MG: 80 TABLET, FILM COATED ORAL at 16:10

## 2018-04-02 RX ADMIN — FONDAPARINUX SODIUM 2.5 MG: 2.5 INJECTION, SOLUTION SUBCUTANEOUS at 08:37

## 2018-04-02 RX ADMIN — INSULIN GLARGINE 12 UNITS: 100 INJECTION, SOLUTION SUBCUTANEOUS at 22:29

## 2018-04-02 RX ADMIN — FUROSEMIDE 20 MG: 10 INJECTION, SOLUTION INTRAMUSCULAR; INTRAVENOUS at 16:10

## 2018-04-02 RX ADMIN — MIDODRINE HYDROCHLORIDE 10 MG: 5 TABLET ORAL at 08:37

## 2018-04-02 NOTE — TELEPHONE ENCOUNTER
Patient seen inpatient at [facility] for urinary retention  He will need follow up once discharged from hospital   Unsure if he will be d/c with jacobs catheter  Patient may be released to rehab prior to home  If jacobs remains in place he will need follow up within 7-10 days with void trial and visit with MD    Due to strained financial stability at this time, patient is unsure of current insurance coverage  If deductible and co-pay is high, patient may need to be scheduled at [facility]

## 2018-04-02 NOTE — OCCUPATIONAL THERAPY NOTE
Occupational Therapy Treatment Note      Saranya Palmer    4/2/2018    Patient Active Problem List   Diagnosis    Anxiety and depression    Benign essential HTN    Diabetic neuropathy, type II diabetes mellitus ([facility] [address] [facility]    Hyperlipidemia    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 ([facility])    Vitamin D deficiency    PAD (peripheral artery disease) ([facility])    Non-healing wound of left heel    Diabetic gastroparesis (HCC)    Bilateral lower extremity edema    Class 2 obesity due to excess calories with serious comorbidity and body mass index (BMI) of 35 0 to 35 9 in adult    Triple vessel coronary artery disease    Acute on chronic diastolic heart failure (HCC)    Other constipation    S/P CABG x 3    Diabetic autonomic neuropathy associated with type 2 diabetes mellitus ([facility])    Hyponatremia       Past Medical History:   Diagnosis Date    Anemia     Anxiety and depression     Autonomic neuropathy     Cataract     Diabetes mellitus ([facility] [address] [facility]     Diabetic autonomic neuropathy associated with type 2 diabetes mellitus ([facility] [address] )     Last Assessed: 12/28/2017    Gastroparesis due to DM ([facility] [address] )     History of DVT (deep vein thrombosis)     left LE    HTN (hypertension)     Hyperlipidemia     Non healing left heel wound     Obesity     Orthostatic hypotension        Past Surgical History:   Procedure Laterality Date    [place] CABG, ARTERY-VEIN, FOUR N/A 3/28/2018    Procedure: CORONARY ARTERY BYPASS GRAFT (CABG) x3 VESSELS, LIMA TO LAD, SVG--> PDA, SVG--> OM2,  RIGHT LEG EVH/SVH TO , INTRA-OP AMANDEEP;  Surgeon: Marylen Glad, MD;  [facility];  Service: Cardiac Surgery    ROTATOR CUFF REPAIR Bilateral       04/02/18 1132   Restrictions/Precautions   Weight Bearing Precautions Per Order Yes   LLE Weight Bearing Per Order PWB   Braces or Orthoses Other (Comment)  (prevalon boot)   Other Precautions Cardiac/sternal;Cognitive;Multiple lines;Telemetry; Fall Risk;Pain;Visual impairment;WBS   Lifestyle   Autonomy Pt reports he was I w/ ADLS PTA however was struggling to complete at home  PT's wife performs IADLS as able   Reciprocal Relationships Pt lives with his wife who works 61 + hours a week and he reports she is only able to provide minimal assistance    Service to Others Pt is on disability    Intrinsic Gratification Pt does not report enjoyable activities    Pain Assessment   Pain Assessment FLACC   Pain Score No Pain   Pain Type Acute pain;Surgical pain   Pain Location Chest   Pain Rating: FLACC (Rest) - Face 0   Pain Rating: FLACC (Rest) - Legs 0   Pain Rating: FLACC (Rest) - Activity 0   Pain Rating: FLACC (Rest) - Cry 0   Pain Rating: FLACC (Rest) - Consolability 0   Score: FLACC (Rest) 0   Pain Rating: FLACC (Activity) - Face 1   Pain Rating: FLACC (Activity) - Legs 0   Pain Rating: FLACC (Activity) - Activity 0   Pain Rating: FLACC (Activity) - Cry 0   Pain Rating: FLACC (Activity) - Consolability 0   Score: FLACC (Activity) 1   ADL   Grooming Assistance 4  Minimal Assistance   Grooming Deficit Setup;Verbal cueing;Supervision/safety; Increased time to complete;Standing with assistive device;Brushing hair; Other (Comment)  (shower cap)   Grooming Comments Pt completed grooming while seated/standing at chair  While standing w/ RW pt able to don shower cap  However pt required VC for encouragement to maintain balance (Min A x2 in standing) while donning cap  After standing for approx 40 secs pt reported feeling dizzy and needing to sit  Pt then finished shower cap and combed hair while seated  pt reported his arms are weak and he has decreased ROM  Pt required rest breaks in between shampoonig and combing hair  Toileting Assistance  4  Minimal Assistance   Toileting Deficit Perineal hygiene   Toileting Comments Pt reports that he is unable to wipe himself PTA   He reported he sometimes tries but also because of his decreased sensation in hands he can't feel if he is holding the paper or not  Pt educated on use of toilet tongs as an AD to assist w/ independence in toileting  Functional Standing Tolerance   Time 1 min   Activity Pt stood in front of chair w/ Min A to maintain balance and for safety  Pt able to manage PWB of LLE but has hx of orthostatic hypotension and becomes very dizzy when standing  Pt unable to tolerated being upright after 1 min and requiring seated break  Pt stood x2 trials w/ just standing- both times becoming dizzy  BP taken at beginning of session reading 122/58   Comments Pt stood w/ RW for support and stability   Bed Mobility   Additional Comments Unable to assess- pt seated in chair prior to and end of session   Transfers   Sit to Stand 3  Moderate assistance   Additional items Assist x 2; Increased time required;Verbal cues;Armrests   Stand to Sit 3  Moderate assistance   Additional items Assist x 2; Increased time required;Verbal cues;Armrests   Additional Comments Pt performed 3 sit-stand trials from chair w/ Mod A x2 for moderate force production into standing, VC for proper hand placement w/ RW, and to maintain cardiac precautions  Once in standing pt required assist and VC to keep eyes open  Pt has hx of orthostatic hypotension which limits his standing tolerance and functional mobility 2* to dizziness and lightheadedness  Functional Mobility   Functional Mobility 3  Moderate assistance   Additional Comments Pt took approx 4 ft in room w/ RW for support, and Mod A to maintain balance and safety  Pt sat in chair after few steps  Additional items Rolling walker   Coordination   In Hand Manipulation Pt reports decreased sensation in hands  W/ eyes closed pt only able to identify tip of middle finger touch and occasional back of hand sensation  Pt reports he often burns his hands on his mugs, drop items, and has decreased  strength   Pt educated on use of adaptive equipment such as built up utensils, large , etc  to assist w/ eating, grooming and other functional tasks to increased independence levels  Cognition   Overall Cognitive Status Impaired   Arousal/Participation Responsive; Cooperative   Attention Attends with cues to redirect   Orientation Level Oriented to person   Memory Decreased recall of recent events;Decreased recall of precautions   Following Commands Follows one step commands without difficulty   Comments Pt is pleasant and cooperative- reports therapy caught him at a bad time and that he feels off today  Pt appears alittle confused and reports feeling foggy  Unable to recall cardiac precautions and requires cues to redirect to task   Vision   Vision Comments Pt reported he has trouble seeing- was going to have cataract surgery  Additional Activities   Additional Activities Other (Comment)  (Cardiac education pkt)   Additional Activities Comments Pt reported not previously receiving the recovering after cardiac surgery education pkt  Pt given pkt and reviewed all precautions and restrictions as described below: Provided pt with Recovering After Cardiac Surgery packet and educated pt regarding; sternal precautions, cardiac precautions, lifiting restrictions, safe activity engagement, energy conservation, lifestyle modifications, stress management and cardiac rehabilitation programs  Pt's questions were addressed after discussion of the packet  Provided Pt with contact information for OT department if questions arrise  Pt continues to benefit from inpatient skilled OT services  Will continue to follow and address previously stated goals     Activity Tolerance   Activity Tolerance Patient limited by fatigue;Treatment limited secondary to medical complications (Comment)   Medical Staff Made Aware Hermelindo Ordonez and RN   Assessment   Assessment Patient participated in Skilled OT session 4/2/18 with interventions consisting of ADL re training with the use of correct body mechnaics, Energy Conservation techniques, deep breathing technique, safety awareness and fall prevention techniques,  long handle equipment, therapeutic exercise to: increase functional use of BUEs, increase BUE muscle strength ,  therapeutic activities to: increase activity tolerance, increase standing tolerance time with unilateral UE support to complete sink level ADLs, increase cardiovascular endurance , increase postural control, increase trunk control and increase OOB/ sitting tolerance   Patient agreeable to OT treatment session, upon arrival patient was found seated OOB to Recliner  Pt reports PTA required assist for ADLs 2* to orthostatic hypotension, fatigue, weakness and decreased ROM  Pt reports he is concerned that his family doesn't have a lot of money and he doesn't know how he will pay for everything  In comparison to previous session, patient with improvements in grooming, transfers and functional mobility   Patient requiring verbal cues for safety, verbal cues for correct technique, verbal cues for pacing thru activity steps and frequent rest periods  Patient continues to be functioning below baseline level, occupational performance remains limited secondary to factors listed above and increased risk for falls and injury  From OT standpoint, recommendation at time of d/c would be Short Term Rehab when medically stable  Patient to benefit from continued Occupational Therapy treatment while in the hospital to address deficits as defined above and maximize level of functional independence with ADLs and functional mobility  Plan   Treatment Interventions ADL retraining;Functional transfer training;UE strengthening/ROM; Endurance training;Cognitive reorientation;Patient/family training;Equipment evaluation/education; Fine motor coordination activities; Compensatory technique education;Continued evaluation;Cardiac education; Energy conservation; Activityengagement   Goal Expiration Date 04/12/18   Treatment Day 1   OT Frequency 3-5x/wk   Recommendation   OT Discharge Recommendation Short Term Rehab   OT - OK to Discharge Yes  (when medically stable)   Barthel Index   Feeding 10   Bathing 0   Grooming Score 0   Dressing Score 5   Bladder Score 0   Bowels Score 10   Toilet Use Score 5   Transfers (Bed/Chair) Score 5   Mobility (Level Surface) Score 0   Stairs Score 0   Barthel Index Score 35   Modified Alana Scale   Modified Alana Scale 4       Elizabeth Cordoba MOT, OTR/L

## 2018-04-02 NOTE — PROGRESS NOTES
Progress Note - Cardiothoracic Surgery   [facility] 61 y o  male MRN: [id number]  Unit/Bed#: [facility] 416-01 Encounter: [id number]      POD # 5 s/p CABG    Pt seen/examined  Interval history and data reviewed with critical care team   Pt doing well  No specific complaints      Off [place]     Medications:   Scheduled Meds:    Current Facility-Administered Medications:  acetaminophen 650 mg Oral Q4H PRN Shay Davis PA-C   amiodarone 200 mg Oral Q8H [facility] Shay Davis PA-C   aspirin 325 mg Oral Daily Shay Davis PA-C   atorvastatin 80 mg Oral Daily With NUNO Singh   bisacodyl 10 mg Rectal Daily PRN Estevan Sim PA-C   collagenase  Topical Daily Debbi Grimaldo DPM   docusate sodium 100 mg Oral BID Bonnie Sewell PA-C   fondaparinux 2 5 mg Subcutaneous Daily Shay Davis PA-C   furosemide 20 mg Intravenous BID (diuretic) Ambar Price PA-C   insulin glargine 12 Units Subcutaneous HS Kathrin Fairbanks MD   insulin lispro 1-5 Units Subcutaneous HS Kathrin Fairbanks MD   insulin lispro 1-6 Units Subcutaneous TID AC Kathrin Fairbanks MD   insulin lispro 10 Units Subcutaneous TID With Meals Shira Alvarado DO   LORazepam 0 5 mg Oral Q8H PRN Ambar Price PA-C   midodrine 10 mg Oral TID With Meals Bonnie Sewell PA-C   mupirocin 1 application Nasal N16H [facility] Shay Davis PA-C   ondansetron 4 mg Intravenous Q6H PRN Shay Davis PA-C   oxyCODONE-acetaminophen 1 tablet Oral Q4H PRN Estevan Sim PA-C   oxyCODONE-acetaminophen 2 tablet Oral Q6H PRN Shay Davis PA-C   pantoprazole 40 mg Oral Early Morning Shay Davis PA-C   polyethylene glycol 17 g Oral Daily Shay Davis PA-C   potassium chloride 10 mEq Oral BID Bonnie Sewell PA-C   tamsulosin 0 4 mg Oral Daily With Auroradwaine Sewell PA-C   temazepam 15 mg Oral HS PRN Bonnie Sewell PA-C     Continuous Infusions:     PRN Meds:   acetaminophen    bisacodyl    LORazepam   ondansetron    oxyCODONE-acetaminophen    oxyCODONE-acetaminophen    temazepam    Vitals: Blood pressure (!) 92/46, pulse 66, temperature 98 8 °F (37 1 °C), temperature source Oral, resp  rate 14, height 6' 1" (1 854 m), weight 113 kg (249 lb 9 oz), SpO2 95 %  ,Body mass index is 32 93 kg/m²  I/O last 24 hours: In: 800 [P O :500; I V :293]  Out: 2355 [Urine:2355]  Invasive Devices     Central Venous Catheter Line            CVC Central Lines 03/28/18 Triple 4 days          Drain            Urethral Catheter Temperature probe 16 Fr  1 day                  Lab, Imaging and other studies:     Results from last 7 days  Lab Units 04/02/18 0441 04/01/18  0424 03/31/18  0426   WBC Thousand/uL 8 00 10 07 11 42*   HEMOGLOBIN g/dL 8 2* 8 3* 8 7*   HEMATOCRIT % 26 0* 26 9* 27 4*   PLATELETS Thousands/uL 258 248 214       Results from last 7 days  Lab Units 04/02/18 0441 04/01/18  0424 03/31/18  0426   SODIUM mmol/L 133* 135* 133*   POTASSIUM mmol/L 4 1 4 0 4 3   CHLORIDE mmol/L 99* 103 100   CO2 mmol/L 31 30 29   BUN mg/dL 15 14 15   CREATININE mg/dL 0 94 0 94 0 86   GLUCOSE RANDOM mg/dL 143* 114 126   CALCIUM mg/dL 8 1* 7 8* 7 9*         No results for input(s): PHART, GSV7ZHR, PO2ART, LLX1WXH, R1UCWJOR, BEART in the last 72 hours          Plan:    Hold BB for hypotension  Continue Midodrine TID  Rehab eval  DC to rehab once bed available    SIGNATURE: Sofia Calixto MD  DATE: April 2, 2018  TIME: 7:42 AM

## 2018-04-02 NOTE — CONSULTS
UROLOGY CONSULTATION NOTE     Patient Identifiers: Ramin Estrella (MRN [id number])  Service Requesting Consultation: Critical Care  Service Providing Consultation:  Urology, CARLTON Cagle    Date of Service: 4/2/2018  Inpatient consult to Urology  Consult performed by: Albert Nguyễn ordered by: Miguel Batista          Reason for Consultation: Urinary retention    ASSESSMENT:     61 y o  old male with  urinary retention  PLAN:     -Maintain jacobs  May attempt void trial prior to discharge  If PVR remains >300 cc of urine, reinsert 16 [place] jacobs catheter  Eventual plan to follow up in outpatient setting once patient has completed rehab  Patient is financially concerned about recent admission and medical requirement  If there is financial burden regarding follow up appointments, may consider [address] [place] [address] Urology clinic   -Continue tamsulosin 0 4mg for possible BPH    History of Present Illness:     Ramin Estrella is a 61 y o  old with a history of triple vessel disease status post CABG x3 on 03/28/2018  Patient was also evaluated for chronic left heel ulcer  He had an abdominal aortogram with unsuccessful attempt to bypass occlusion of left popliteal artery bypass surgery  Their recommendation for surgical revascularization with fem-pop bypass once cardiac issues are resolved  Additionally patient has uncontrolled diabetes, HgbA1C on admission was 9 4, which has required an insulin drip  Patient's urinary catheter has been in place since 03/31/2018 after a failed voiding trial post surgery which required straight catheterization  He states having difficulties voiding for the last several years which included urinary hesitancy, post void dribbling, and incomplete bladder emptying  Unfortunately, he has been unable to follow up with urologist due to financial burden    He denies ever seeing a urologist in the past   No gross hematuria, dysuria, constipation, or recent infection noted  He is unsure when his last prostate cancer screening with PSA was performed  Denies any strong family history of prostate cancer        Past Medical, Past Surgical History:     Past Medical History:   Diagnosis Date    Anemia     Anxiety and depression     Autonomic neuropathy     Cataract     Diabetes mellitus ([address] )     Diabetic autonomic neuropathy associated with type 2 diabetes mellitus (HCC)     Last Assessed: 12/28/2017    Gastroparesis due to DM (HCC)     History of DVT (deep vein thrombosis)     left LE    HTN (hypertension)     Hyperlipidemia     Non healing left heel wound     Obesity     Orthostatic hypotension    :    Past Surgical History:   Procedure Laterality Date    [place] CABG, ARTERY-VEIN, FOUR N/A 3/28/2018    Procedure: CORONARY ARTERY BYPASS GRAFT (CABG) x3 VESSELS, LIMA TO LAD, SVG--> PDA, SVG--> OM2,  RIGHT LEG EVH/SVH TO , INTRA-OP AMANDEEP;  Surgeon: Nhan Black MD;  [facility];  Service: Cardiac Surgery    ROTATOR CUFF REPAIR Bilateral    :    Medications, Allergies:     Current Facility-Administered Medications   Medication Dose Route Frequency    acetaminophen (TYLENOL) tablet 650 mg  650 mg Oral Q4H PRN    amiodarone tablet 200 mg  200 mg Oral Q8H [facility]    aspirin tablet 325 mg  325 mg Oral Daily    atorvastatin (LIPITOR) tablet 80 mg  80 mg Oral Daily With Dinner    bisacodyl (DULCOLAX) rectal suppository 10 mg  10 mg Rectal Daily PRN    collagenase (SANTYL) ointment   Topical Daily    docusate sodium (COLACE) capsule 100 mg  100 mg Oral BID    fondaparinux (ARIXTRA) subcutaneous injection 2 5 mg  2 5 mg Subcutaneous Daily    furosemide (LASIX) injection 20 mg  20 mg Intravenous BID (diuretic)    insulin glargine (LANTUS) subcutaneous injection 12 Units  12 Units Subcutaneous HS    insulin lispro (HumaLOG) 100 units/mL subcutaneous injection 1-5 Units  1-5 Units Subcutaneous HS    insulin lispro (HumaLOG) 100 units/mL subcutaneous injection 1-6 Units  1-6 Units Subcutaneous TID AC    insulin lispro (HumaLOG) 100 units/mL subcutaneous injection 10 Units  10 Units Subcutaneous TID With Meals    LORazepam (ATIVAN) tablet 0 5 mg  0 5 mg Oral Q8H PRN    midodrine (PROAMATINE) tablet 10 mg  10 mg Oral TID With Meals    mupirocin (BACTROBAN) 2 % nasal ointment 1 application  1 application Nasal [address]    ondansetron (ZOFRAN) injection 4 mg  4 mg Intravenous Q6H PRN    oxyCODONE-acetaminophen (PERCOCET) 5-325 mg per tablet 1 tablet  1 tablet Oral Q4H PRN    oxyCODONE-acetaminophen (PERCOCET) 5-325 mg per tablet 2 tablet  2 tablet Oral Q6H PRN    pantoprazole (PROTONIX) EC tablet 40 mg  40 mg Oral Early Morning    polyethylene glycol (MIRALAX) packet 17 g  17 g Oral Daily    potassium chloride (K-DUR,KLOR-CON) CR tablet 10 mEq  10 mEq Oral BID    tamsulosin (FLOMAX) capsule 0 4 mg  0 4 mg Oral Daily With Dinner    temazepam (RESTORIL) capsule 15 mg  15 mg Oral HS PRN       Allergies: Allergies   Allergen Reactions    Metformin    :    Social and Family History:   Social History:   Social History   Substance Use Topics    Smoking status: Former Smoker     Packs/day: 1 00     Years: 12 00     Types: Cigarettes     Quit date: 3/23/1994    Smokeless tobacco: Never Used    Alcohol use No        History   Smoking Status    Former Smoker    Packs/day: 1 00    Years: 12 00    Types: Cigarettes    Quit date: 3/23/1994   Smokeless Tobacco    Never Used       Family History:  Family History   Problem Relation Age of Onset    Atrial fibrillation Mother     Stroke Mother     Hypertension Father     Heart attack Paternal Grandfather 61   :     Review of Systems:     General: Fever, chills, or night sweats: negative  Cardiac: Negative for chest pain  Pulmonary: Negative for shortness of breath  Gastrointestinal: Abdominal pain negative  Nausea, vomiting, or diarrhea negative,  Genitourinary: See HPI above    Patient does not have hematuria  All other systems queried were negative  Physical Exam:   General: Patient is pleasant and in NAD  Awake and alert  BP (!) 92/46   Pulse 66   Temp 98 8 °F (37 1 °C) (Oral)   Resp 14   Ht 6' 1" (1 854 m)   Wt 113 kg (249 lb 9 oz)   SpO2 95%   BMI 32 93 kg/m² Temp (24hrs), Av 5 °F (36 9 °C), Min:98 °F (36 7 °C), Max:99 °F (37 2 °C)  current; Temperature: 98 8 °F (37 1 °C)  I/O last 24 hours: In: 973 [P O :680; I V :293]  Out: 2355 [Urine:2355]  Skin: warm, dry, intact  Cardiac: S1S2, HRR, Peripheral edema: negative  Pulmonary: Non-labored breathing  Abdomen: Soft, non-tender, non-distended  No surgical scars  No masses, tenderness, hernias noted  Musculoskeletal: AROM with no joint deformity or tenderness  Neurology: alert, oriented x3, affect appropriate, no focal neurological deficits, moves all extremities well, no involuntary movements and reflexes at knee and ankle intact  Genitourinary: Negative CVA tenderness, negative suprapubic tenderness  PALMA: in place draining clear yellow urine  no clots      Labs:     Lab Results   Component Value Date    HGB 8 2 (L) 2018    HCT 26 0 (L) 2018    WBC 8 00 2018     2018   ]    Lab Results   Component Value Date     (L) 2018    K 4 1 2018    CL 99 (L) 2018    CO2 31 2018    BUN 15 2018    CREATININE 0 94 2018    CALCIUM 8 1 (L) 2018    GLUCOSE 143 (H) 2018   ]        Thank you for allowing me to participate in this patients care  Please do not hesitate to call with any additional questions    CARLTON Pierre

## 2018-04-02 NOTE — PLAN OF CARE
Problem: PHYSICAL THERAPY ADULT  Goal: Performs mobility at highest level of function for planned discharge setting  See evaluation for individualized goals  Treatment/Interventions: Functional transfer training, LE strengthening/ROM, Therapeutic exercise, Endurance training, Patient/family training, Gait training, Bed mobility, Equipment eval/education  Equipment Recommended: Wheelchair, Florence Chung       See flowsheet documentation for full assessment, interventions and recommendations  Outcome: Progressing  Prognosis: Fair  Problem List: Decreased strength, Decreased endurance, Impaired balance, Decreased mobility, Obesity, Pain, Orthopedic restrictions  Assessment: Pt tolerated treatment fairly and is improving slowly with functional mobility and activity tolerance  ZH=942/58 in sitting  Requires mod A x2 for x3 sit to stand trials from arm chair  PWB LLE maintained with verbal cues and globoped shoe donned prior to treatment  Unable to get BP reading during standing trials, however complains of "lightheadedness"  Standing tolerance approximately 2` at longest with support of RW   x2 LOB corrected by therapist during 2nd stand while attempting to use BUE for shower cap task  Able to ambulate approximately 5` forward with mod A x1 and close chair follow  Tolerates seated LE exercises fairly, however complains of discomfort from catheter  Will continue to benefit from ongoing skilled PT to maximize his functional mobility and increase his level of independence  Recommending rehab at time of discharge when medically appropriate  Barriers to Discharge: Inaccessible home environment, Decreased caregiver support     Recommendation: Post acute IP rehab     PT - OK to Discharge: Yes (to rehab when medically stable)    See flowsheet documentation for full assessment

## 2018-04-02 NOTE — CONSULTS
Consultation Follow-up - Russel Amin 61 y o  male MRN: [id number]    Unit/Bed#: [facility] 420-01 Encounter: [id number]      Assessment/Plan     Assessment: This is a 61y o -year-old male with diabetes with hyperglycemia, hypertension, hyperlipidemia and coronary artery disease s/p CABG  Plan:  DM-II  -now off pressors, has been transitioned off insulin drip and now on Lantus 12U HS, Humalog 10U TID, and ISS, glu 140s-160s past 24H  -flowsheets currently reporting that his is on continuous insulin gtt but he is no longer on this and has been on SQ since last evening  -continue same insulin regimen with close attention to increasing readings as he is on significantly lower regimen than prior to surgery    CC: Diabetes Follow-up: This consult has been completed and this is follow-up of that original consult  History of Present Illness     HPI: Russel Amin is a 61y o  year old male with type 2 diabetes  He is on insulin at home  He denies any polyuria, polydipsia, nocturia and blurry vision  He denies stroke but does admit to neuropathy and claudication  He denies any hypoglycemia  He denies extremity or surgical pain today but continues to feel lightheaded/dizzy with movement  He denies N/V or diarrhea and reports that his constipation has improved significantly over the past three days  Inpatient consult to Endocrinology     Performed by  Alvin Mcdowell     Authorized by Rosalino Myers            Review of Systems   Constitutional: Positive for chills and fatigue  Negative for fever  HENT: Negative for trouble swallowing  Eyes: Positive for visual disturbance  Respiratory: Negative for cough, choking, shortness of breath and wheezing  Cardiovascular: Negative for chest pain, palpitations and leg swelling  Gastrointestinal: Positive for abdominal distention  Negative for abdominal pain, nausea and vomiting  Genitourinary: Negative for difficulty urinating     Musculoskeletal: Positive for joint swelling  L foot with minimal swelling, in stabilizer boot   Skin: Positive for pallor  Negative for color change  Allergic/Immunologic: Negative  Neurological: Positive for dizziness and light-headedness  Negative for headaches  Hematological: Negative  Psychiatric/Behavioral: Negative  All other systems reviewed and are negative      Historical Information   Past Medical History:   Diagnosis Date    Anemia     Anxiety and depression     Autonomic neuropathy     Cataract     Diabetes mellitus [facility]     Diabetic autonomic neuropathy associated with type 2 diabetes mellitus ([facility] )     Last Assessed: 12/28/2017    Gastroparesis due to DM ([facility] )     History of DVT (deep vein thrombosis)     left LE    HTN (hypertension)     Hyperlipidemia     Non healing left heel wound     Obesity     Orthostatic hypotension      Past Surgical History:   Procedure Laterality Date    [place] CABG, ARTERY-VEIN, FOUR N/A 3/28/2018    Procedure: CORONARY ARTERY BYPASS GRAFT (CABG) x3 VESSELS, LIMA TO LAD, SVG--> PDA, SVG--> OM2,  RIGHT LEG EVH/SVH TO , INTRA-OP AMANDEEP;  Surgeon: Mickie Combs MD;  Location:  MAIN OR;  Service: Cardiac Surgery    ROTATOR CUFF REPAIR Bilateral      Social History   History   Alcohol Use No     History   Drug Use No     History   Smoking Status    Former Smoker    Packs/day: 1 00    Years: 12 00    Types: Cigarettes    Quit date: 3/23/1994   Smokeless Tobacco    Never Used     Family History:   Family History   Problem Relation Age of Onset    Atrial fibrillation Mother     Stroke Mother     Hypertension Father     Heart attack Paternal Grandfather 61       Meds/Allergies   Current Facility-Administered Medications   Medication Dose Route Frequency Provider Last Rate Last Dose    acetaminophen (TYLENOL) tablet 650 mg  650 mg Oral Q4H PRN Shya Davis PA-C        amiodarone tablet 200 mg  200 mg Oral Q8H [address] Shay Davis PA-C   200 mg at 04/02/18 1416  aspirin tablet 325 mg  325 mg Oral Daily Shay Davis PA-C   325 mg at 04/02/18 7924    atorvastatin (LIPITOR) tablet 80 mg  80 mg Oral Daily With L-3 Angelina Davis PA-C   80 mg at 04/01/18 1618    bisacodyl (DULCOLAX) rectal suppository 10 mg  10 mg Rectal Daily PRN [place] Timothy Davis PA-C        collagenase (SANTYL) ointment   Topical Daily Aby Lewis DPM   1 application at 18/30/73 0840    docusate sodium (COLACE) capsule 100 mg  100 mg Oral BID Bonnie Sewell PA-C   100 mg at 04/02/18 2118    fondaparinux (ARIXTRA) subcutaneous injection 2 5 mg  2 5 mg Subcutaneous Daily Shay Davis PA-C   2 5 mg at 04/02/18 6103    furosemide (LASIX) injection 20 mg  20 mg Intravenous BID (diuretic) Celestine Schilder, PA-C        insulin glargine (LANTUS) subcutaneous injection 12 Units  12 Units Subcutaneous HS Karen Mcwilliams MD        insulin lispro (HumaLOG) 100 units/mL subcutaneous injection 1-5 Units  1-5 Units Subcutaneous HS Karen Mcwilliams MD        insulin lispro (HumaLOG) 100 units/mL subcutaneous injection 1-6 Units  1-6 Units Subcutaneous TID [facility] Karen Mcwilliams MD   1 Units at 04/02/18 0615    insulin lispro (HumaLOG) 100 units/mL subcutaneous injection 10 Units  10 Units Subcutaneous TID With Meals Kishore Chaudhari DO   10 Units at 04/02/18 1218    LORazepam (ATIVAN) tablet 0 5 mg  0 5 mg Oral Q8H PRN Celestine Schilder, PA-C        midodrine (PROAMATINE) tablet 10 mg  10 mg Oral TID With Meals Bonnie Sewell PA-C   10 mg at 04/02/18 1216    ondansetron (ZOFRAN) injection 4 mg  4 mg Intravenous Q6H PRN [place] Timothy Davis PA-C   4 mg at 03/29/18 1532    oxyCODONE-acetaminophen (PERCOCET) 5-325 mg per tablet 1 tablet  1 tablet Oral Q4H PRN Sandra Bustillo PA-C   1 tablet at 03/29/18 1206    oxyCODONE-acetaminophen (PERCOCET) 5-325 mg per tablet 2 tablet  2 tablet Oral Q6H PRN Sandra Bustillo PA-C   2 tablet at 03/29/18 3872    pantoprazole (PROTONIX) EC tablet 40 mg 40 mg Oral Early Morning Shay Davis PA-C   40 mg at 04/02/18 2388    polyethylene glycol (MIRALAX) packet 17 g  17 g Oral Daily Shay Davis PA-C   17 g at 03/31/18 0940    potassium chloride (K-DUR,KLOR-CON) CR tablet 10 mEq  10 mEq Oral BID Bonnie Sewell PA-C   10 mEq at 04/02/18 1771    tamsulosin (FLOMAX) capsule 0 4 mg  0 4 mg Oral Daily With [place] Sewell PA-C   0 4 mg at 04/01/18 1618    temazepam (RESTORIL) capsule 15 mg  15 mg Oral HS PRN Bonnie Sewell PA-C         Allergies   Allergen Reactions    Metformin      Objective   Vitals: Blood pressure (P) 117/56, pulse (P) 73, temperature (P) 98 3 °F (36 8 °C), temperature source (P) Oral, resp  rate (P) 20, height 6' 1" (1 854 m), weight 113 kg (249 lb 9 oz), SpO2 (P) 94 %  Intake/Output Summary (Last 24 hours) at 04/02/18 1445  Last data filed at 04/02/18 0901   Gross per 24 hour   Intake           275 18 ml   Output             2125 ml   Net         -1849 82 ml     Invasive Devices     Peripheral Intravenous Line            Peripheral IV 04/02/18 Right Forearm less than 1 day    Peripheral IV 04/02/18 Right Hand less than 1 day          Drain            Urethral Catheter Temperature probe 16 Fr  2 days              Physical Exam   Constitutional: He is oriented to person, place, and time  He appears well-developed and well-nourished  No distress  HENT:   Head: Normocephalic and atraumatic  Right Ear: External ear normal    Left Ear: External ear normal    Eyes: Pupils are equal, round, and reactive to light  No scleral icterus  Neck: Normal range of motion  Neck supple  No tracheal deviation present  Cardiovascular: Normal rate, regular rhythm and normal heart sounds  Exam reveals no gallop and no friction rub  No murmur heard  Pulmonary/Chest: Effort normal and breath sounds normal  No respiratory distress  He has no wheezes  Abdominal: He exhibits distension  There is no tenderness     Musculoskeletal: LLE ROM limited by surgical shoe   Neurological: He is alert and oriented to person, place, and time  No cranial nerve deficit  Skin: Skin is warm and dry  He is not diaphoretic  Psychiatric: He has a normal mood and affect  Nursing note and vitals reviewed  The history was obtained from the review of the chart, patient  Lab Results:       Lab Results   Component Value Date    WBC 8 00 04/02/2018    HGB 8 2 (L) 04/02/2018    HCT 26 0 (L) 04/02/2018    MCV 88 04/02/2018     04/02/2018     Lab Results   Component Value Date/Time    BUN 15 04/02/2018 04:41 AM    BUN 19 09/13/2016 09:24 AM     (L) 04/02/2018 04:41 AM     09/13/2016 09:24 AM    K 4 1 04/02/2018 04:41 AM    K 5 2 09/13/2016 09:24 AM    CL 99 (L) 04/02/2018 04:41 AM    CL 96 (L) 09/13/2016 09:24 AM    CO2 31 04/02/2018 04:41 AM    CO2 26 09/13/2016 09:24 AM    CREATININE 0 94 04/02/2018 04:41 AM    CREATININE 1 03 09/13/2016 09:24 AM    AST 22 03/15/2018 05:46 AM    AST 22 09/13/2016 09:24 AM    ALT 20 03/15/2018 05:46 AM    ALT 29 09/13/2016 09:24 AM    ALB 2 5 (L) 03/15/2018 05:46 AM    ALB 4 0 09/13/2016 09:24 AM     No results for input(s): CHOL, HDL, LDL, TRIG, VLDL in the last 72 hours  No results found for: Heber Cole  POC Glucose   Date Value   04/02/2018 167 mg/dl (H)   04/02/2018 179 mg/dl (H)   04/02/2018 148 mg/dl (H)   04/01/2018 109 mg/dl   04/01/2018 110 mg/dl   04/01/2018 161 mg/dl (H)   04/01/2018 121 mg/dl   04/01/2018 150 mg/dl (H)   04/01/2018 93 mg/dl   04/01/2018 136 mg/dl   01/11/2018 130   09/07/2016 84     Imaging Studies: I have personally reviewed pertinent reports  Portions of the record may have been created with voice recognition software  Tony CHAMBERS    Internal Medicine PGY-3  4/2/2018 4:07 PM

## 2018-04-02 NOTE — PLAN OF CARE
Problem: OCCUPATIONAL THERAPY ADULT  Goal: Performs self-care activities at highest level of function for planned discharge setting  See evaluation for individualized goals  Treatment Interventions: ADL retraining, Functional transfer training, Endurance training, Cognitive reorientation, Patient/family training, Equipment evaluation/education, Compensatory technique education, Continued evaluation, Energy conservation, Activityengagement          See flowsheet documentation for full assessment, interventions and recommendations  Outcome: Progressing  Limitation: Decreased ADL status, Decreased endurance, Decreased sensation, Decreased self-care trans, Decreased high-level ADLs  Prognosis: Fair  Assessment: Patient participated in Skilled OT session 4/2/18 with interventions consisting of ADL re training with the use of correct body mechnaics, Energy Conservation techniques, deep breathing technique, safety awareness and fall prevention techniques,  long handle equipment, therapeutic exercise to: increase functional use of BUEs, increase BUE muscle strength ,  therapeutic activities to: increase activity tolerance, increase standing tolerance time with unilateral UE support to complete sink level ADLs, increase cardiovascular endurance , increase postural control, increase trunk control and increase OOB/ sitting tolerance   Patient agreeable to OT treatment session, upon arrival patient was found seated OOB to Recliner  Pt reports PTA required assist for ADLs 2* to orthostatic hypotension, fatigue, weakness and decreased ROM  Pt reports he is concerned that his family doesn't have a lot of money and he doesn't know how he will pay for everything  In comparison to previous session, patient with improvements in grooming, transfers and functional mobility   Patient requiring verbal cues for safety, verbal cues for correct technique, verbal cues for pacing thru activity steps and frequent rest periods   Patient continues to be functioning below baseline level, occupational performance remains limited secondary to factors listed above and increased risk for falls and injury  From OT standpoint, recommendation at time of d/c would be Short Term Rehab when medically stable  Patient to benefit from continued Occupational Therapy treatment while in the hospital to address deficits as defined above and maximize level of functional independence with ADLs and functional mobility        OT Discharge Recommendation: Short Term Rehab  OT - OK to Discharge: Yes (when medically stable)      Comments: Elizabeth Cordoba MOT, OTR/L

## 2018-04-02 NOTE — RESTORATIVE TECHNICIAN NOTE
Restorative Specialist Mobility Note       Activity: Ambulate in guardado, Chair     Assistive Device: Front wheel walker

## 2018-04-02 NOTE — PHYSICAL THERAPY NOTE
PHYSICAL THERAPY TREATMENT NOTE    Patient Name: Shay Duron  [id number] Date: 4/2/2018 04/02/18 1130   Pain Assessment   Pain Assessment No/denies pain   Pain Score No Pain   Restrictions/Precautions   Weight Bearing Precautions Per Order Yes   LLE Weight Bearing Per Order PWB   Braces or Orthoses (heel offloading shoe LLE)   Other Precautions Cardiac/sternal;WBS;Multiple lines;Telemetry; Fall Risk   General   Chart Reviewed Yes   Response to Previous Treatment Patient reporting fatigue but able to participate  Family/Caregiver Present No   Cognition   Overall Cognitive Status WFL   Arousal/Participation Cooperative;Responsive; Alert   Attention Attends with cues to redirect   Comments Reports feeling "foggy" possibly due to Ativan use last night  Subjective   Subjective Pt agrees to PT treatment  Bed Mobility   Supine to Sit Unable to assess  (OOB in chair pre/post treatment)   Transfers   Sit to Stand 3  Moderate assistance   Additional items Assist x 2; Increased time required;Verbal cues  (x3 trials )   Stand to Sit 3  Moderate assistance   Additional items Assist x 2; Increased time required;Verbal cues   Ambulation/Elevation   Gait pattern Improper Weight shift;Decreased foot clearance; Short stride; Step to;Excessively slow   Gait Assistance 3  Moderate assist   Additional items Assist x 1;Verbal cues  (with chair follow from 2nd assist for safety)   Assistive Device Rolling walker   Distance 5` x1 forward   Balance   Static Sitting Fair +   Dynamic Sitting Fair   Static Standing Poor +   Dynamic Standing Poor   Ambulatory Poor   Endurance Deficit   Endurance Deficit Yes   Endurance Deficit Description limited standing tolerance, reports "lightheadedness"   Activity Tolerance   Activity Tolerance Patient limited by fatigue;Treatment limited secondary to medical complications (Comment)  (PU=939/58 in sitting; unable to get BP reading in standing)   Nurse Made Aware Per RN, pt appropriate to treat Exercises   Knee AROM Long Arc Quad Sitting;15 reps;AROM; Bilateral  (x2)   Ankle Pumps Sitting;15 reps;AROM; Bilateral  (x2)   Marching Sitting;10 reps;AROM; Bilateral  (x1)   Balance training  standing balance/tolerance with varying UE support   Assessment   Prognosis Fair   Problem List Decreased strength;Decreased endurance; Impaired balance;Decreased mobility;Obesity;Pain;Orthopedic restrictions   Assessment Pt tolerated treatment fairly and is improving slowly with functional mobility and activity tolerance  XY=196/58 in sitting  Requires mod A x2 for x3 sit to stand trials from arm chair  PWB LLE maintained with verbal cues and globoped shoe donned prior to treatment  Unable to get BP reading during standing trials, however complains of "lightheadedness"  Standing tolerance approximately 2` at longest with support of RW   x2 LOB corrected by therapist during 2nd stand while attempting to use BUE for shower cap task  Able to ambulate approximately 5` forward with mod A x1 and close chair follow  Tolerates seated LE exercises fairly, however complains of discomfort from catheter  Will continue to benefit from ongoing skilled PT to maximize his functional mobility and increase his level of independence  Recommending rehab at time of discharge when medically appropriate  Goals   Patient Goals to feel better   STG Expiration Date 04/08/18   LTG Expiration Date 04/12/18   Treatment Day 2   Plan   Treatment/Interventions Functional transfer training;LE strengthening/ROM; Therapeutic exercise; Endurance training;Patient/family training;Equipment eval/education; Bed mobility;Gait training   Progress Slow progress, decreased activity tolerance   PT Frequency 5x/wk   Recommendation   Recommendation Post acute IP rehab   Equipment Recommended Wheelchair;Walker   Ani Feliciano, PT,DPT

## 2018-04-02 NOTE — PROGRESS NOTES
Progress Note - Critical Care   Elmer Pa 61 y o  male MRN: [id number]  Unit/Bed#: [facility] 416-01 Encounter: [id number]    Attending Physician: Mike Kothari MD    24 Hour Events: POD # 5 s/p CABG x 3  [facility] off since yesterday  Urology consult pending for urinary retention  Given an extra dose of lasix yesterday for low UOP  Consultants: Endocrine, Podiatry, cardiology    Allergies:    Allergies   Allergen Reactions    Metformin        Medications:   Scheduled Meds:    Current Facility-Administered Medications:  acetaminophen 650 mg Oral Q4H PRN Shay Davis PA-C   amiodarone 200 mg Oral Q8H [address] Shay Davis PA-C   aspirin 325 mg Oral Daily Shay Davis PA-C   atorvastatin 80 mg Oral Daily With NUNO Singh   bisacodyl 10 mg Rectal Daily PRN Brionna Flakoelfego Davis PA-C   collagenase  Topical Daily Debbi Grimaldo DPM   docusate sodium 100 mg Oral BID Bonnie Sewell PA-C   fondaparinux 2 5 mg Subcutaneous Daily Shay Davis PA-C   furosemide 20 mg Intravenous BID (diuretic) Samia Fernandez PA-C   insulin glargine 12 Units Subcutaneous HS Margie Gagnon MD   insulin lispro 1-5 Units Subcutaneous HS Margie Gagnon MD   insulin lispro 1-6 Units Subcutaneous TID AC Margie Gagnon MD   insulin lispro 10 Units Subcutaneous TID With Meals Lupis Wilhelm DO   LORazepam 0 5 mg Oral Q8H PRN Samia Fernandez PA-C   midodrine 10 mg Oral TID With Meals Bonnie Sewell PA-C   mupirocin 1 application Nasal G38S [address] Shay Davis PA-C   ondansetron 4 mg Intravenous Q6H PRN Shay Davis PA-C   oxyCODONE-acetaminophen 1 tablet Oral Q4H PRN Michelle Landry PA-C   oxyCODONE-acetaminophen 2 tablet Oral Q6H PRN Shay Dvais PA-C   pantoprazole 40 mg Oral Early Morning Shay Davis PA-C   polyethylene glycol 17 g Oral Daily Shay Davis PA-C   potassium chloride 10 mEq Oral BID Bonnie Sewell PA-C   tamsulosin 0 4 mg Oral Daily With Upatoi Media A Himelson, NUNO   temazepam 15 mg Oral HS PRN Bonnie Sewell PA-C       VTE Pharmacologic Prophylaxis: Fondaparinux (Arixtra)  VTE Mechanical Prophylaxis: sequential compression device    Continuous Infusions:   PRN Meds:    acetaminophen 650 mg Q4H PRN   bisacodyl 10 mg Daily PRN   LORazepam 0 5 mg Q8H PRN   ondansetron 4 mg Q6H PRN   oxyCODONE-acetaminophen 1 tablet Q4H PRN   oxyCODONE-acetaminophen 2 tablet Q6H PRN   temazepam 15 mg HS PRN       Home Medications:   Prior to Admission medications    Medication Sig Start Date End Date Taking? Authorizing Provider   atorvastatin (LIPITOR) 40 mg tablet Take 1 tablet (40 mg total) by mouth daily 18   Kimberly Anna DO   insulin aspart protamine-insulin aspart (NOVOLOG MIX 70/30 FLEXPEN) 100 Units/mL SUPN Inject under the skin Twice daily 2/11/15   Historical Provider, MD   LANTUS SOLOSTAR injection pen 100 units/mL Inject 0 8 mL (80 Units total) under the skin 2 (two) times a day 18   Kimberly Anna DO   LORazepam (ATIVAN) 0 5 mg tablet Take by mouth every 8 (eight) hours as needed for anxiety    Historical Provider, MD   metoclopramide (REGLAN) 5 mg tablet Take 5 mg by mouth daily      Historical Provider, MD   midodrine (PROAMATINE) 5 mg tablet Take 1 tablet (5 mg total) by mouth 3 (three) times a day 18   Kimberly Anna DO   torsemide [facility]) 10 mg tablet Take 1 tablet (10 mg total) by mouth daily 18   Kimberly Anna DO       Vitals:   Vitals:    18 0200 18 0300 18 0400 18 0600   BP: (!) 92/46 100/50 (!) 90/48 (!) 92/46   Pulse: 66 64 66 66   Resp: 14 15 15 14   Temp:   98 8 °F (37 1 °C)    TempSrc:   Oral    SpO2: 99% 98% 98% 95%   Weight:       Height:         Tele Rhythm: NSR This was personally reviewed by myself      Respiratory:  SpO2: SpO2: 95 %, SpO2 Activity: SpO2 Activity: At Rest, SpO2 Device: O2 Device: None (Room air)    Temperature: Temp (24hrs), Av 5 °F (36 9 °C), Min:98 °F (36 7 °C), Max:99 °F (37 2 °C)  Current: Temperature: 98 8 °F (37 1 °C)    Weights:   Weight (last 2 days)     Date/Time   Weight    04/01/18 0600  113 (249 56)    03/31/18 0600  113 (249 56)            IBW: 79 9 kg  Body mass index is 32 93 kg/m²  Intake and Outputs:    Intake/Output Summary (Last 24 hours) at 04/02/18 0653  Last data filed at 04/02/18 0600   Gross per 24 hour   Intake           792 95 ml   Output             2355 ml   Net         -1562 05 ml     I/O last 24 hours: In: 1581 5 [P O :980; I V :601 5]  Out: 2336 [[id number]]    UOP: /hour     Labs:    Results from last 7 days  Lab Units 04/02/18  0441 04/01/18  0424 03/31/18  0426   WBC Thousand/uL 8 00 10 07 11 42*   HEMOGLOBIN g/dL 8 2* 8 3* 8 7*   HEMATOCRIT % 26 0* 26 9* 27 4*   PLATELETS Thousands/uL 258 248 214   NEUTROS PCT % 65 68 78*   MONOS PCT % 9 8 7       Results from last 7 days  Lab Units 04/02/18  0441 04/01/18  0424 03/31/18  0426   SODIUM mmol/L 133* 135* 133*   POTASSIUM mmol/L 4 1 4 0 4 3   CHLORIDE mmol/L 99* 103 100   CO2 mmol/L 31 30 29   BUN mg/dL 15 14 15   CREATININE mg/dL 0 94 0 94 0 86   CALCIUM mg/dL 8 1* 7 8* 7 9*   GLUCOSE RANDOM mg/dL 143* 114 126         Results from last 7 days  Lab Units 04/02/18  0441 03/30/18  0424 03/29/18  0402   MAGNESIUM mg/dL 2 1 2 2 2 3       Results from last 7 days  Lab Units 03/27/18  0524   PHOSPHORUS mg/dL 4 0      Micro:   Blood Culture:   Lab Results   Component Value Date    BLOODCX No Growth After 5 Days  03/15/2018    BLOODCX No Growth After 5 Days   03/15/2018     Urine Culture: No results found for: URINECX  Sputum Culture: No components found for: SPUTUMCX  Wound Culure: No results found for: WOUNDCULT        Imaging  No new imaging    Nutrition:        Diet Orders            Start     Ordered    03/29/18 1202  Diet Cardiac; Cardiac TLC 2 3 GM NA; Fluid Restriction 1800 ML, Consistent Carbohydrate Diet Level 2 (5 carb servings/75 grams CHO/meal)  Diet effective 0500     Question Answer Comment   Diet Type Cardiac    Cardiac Cardiac TLC 2 3 GM NA    Other Restriction(s): Fluid Restriction 1800 ML    Other Restriction(s): Consistent Carbohydrate Diet Level 2 (5 carb servings/75 grams CHO/meal)    RD to adjust diet per protocol? No        03/29/18 1201        Physical Exam:    General Appearance:  Well developed male in bedside chair  HENT: Atraumatic without obvious abnormality  Eyes: No icterus  Cardiac: Regular rate and rhythm, no murmur, no rub  Pulmonary: Clear to auscultation bilaterally, no cough, no secretions  Gastrointestinal: Soft, no distention, obese  : Stout present: yes   Musculoskeletal: Trace edema bilaterally  Neuro:  Alert  Follows commands  Psych: Mood and affect seem appropriate  Skin: Warm  Incisions: Sternum is stable  Incision is clean, dry, and intact  Invasive lines and devices:   Invasive Devices     Central Venous Catheter Line            CVC Central Lines 03/28/18 Triple 4 days          Drain            Urethral Catheter Temperature probe 16 Fr  1 day                Assessment:  Principal Problem:    Triple vessel coronary artery disease  Active Problems:    S/P CABG x 3    PAD (peripheral artery disease) (HCC)    Non-healing wound of left heel    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 (HCC)    Diabetic neuropathy, type II diabetes mellitus (HCC)    Diabetic gastroparesis (HCC)    Diabetic autonomic neuropathy associated with type 2 diabetes mellitus (HCC)    Hyperlipidemia    Acute on chronic diastolic heart failure (HCC)    Bilateral lower extremity edema    Anxiety and depression    Class 2 obesity due to excess calories with serious comorbidity and body mass index (BMI) of 35 0 to 35 9 in adult    Hyponatremia      Plan:    Neuro:   · Pain controlled with: percocet PRN  · Regulate sleep/wake cycle  · Trend neuro exam  CV:   · Cardiac infusions: None  · MAP goal > 65   · Rhythm: NSR  · Follow rhythm on telemetry  · No BB with hypotension  · Epicardial pacing wires out  · Continue PO amio, statin, and ASA therapy  · D/C TLC  Lung:   · Pulmonary toileting and IS  · Encourage deep breathing, coughing, and incentive spirometry  · SpO2 goal >92%  · Chest tubes have been discontinued  GI:   · Continue PPI for stress ulcer prophylaxis  · Continue bowel regimen  · Zofran PRN for nausea  FEN:   · Diuretic plan: Lasix 20 mg IV q 12 hours  · K-dur 20 mEQ PO q 12  · Goal 24 hour fluid balance: -1 L  · Nutrition/diet plan: Oral diet  · Replete electrolytes with goals: K >4 0, Mag >2 0, and Phos >3 0  :   · Indwelling Tsout present: yes   · Keep Stout- Flomax and urology consult  · Trend UOP and BUN/creat  · Strict I and O  ID:   · Trend temps and WBC count  · Maintain normothermia  Heme:   · Trend hgb and plts  · Transfuse as needed for goal hgb >7 0  Endo:   · Glycemic control plan: History of diabetes: SQ insulin  Endocrine consult  MSK/Skin:  · Mobility goal: OOB as able  · PT consult: yes  · OT consult: yes  · Frequent turning and pressure off-loading  · Local wound care as needed    Disposition:  Transfer to telemetry  Needs rehab placement  Code Status: Level 1 - Full Code    Counseling / Coordination of Care  Total Critical Care time spent 0 minutes excluding procedures, teaching and family updates  Collaborative bedside rounds performed with cardiac surgery attending and bedside RN      SIGNATURE: Mila Escalera PA-C  DATE: April 2, 2018  TIME: 6:53 AM

## 2018-04-03 LAB
ANION GAP SERPL CALCULATED.3IONS-SCNC: 6 MMOL/L (ref 4–13)
BUN SERPL-MCNC: 14 MG/DL (ref 5–25)
CALCIUM SERPL-MCNC: 8.3 MG/DL (ref 8.3–10.1)
CHLORIDE SERPL-SCNC: 101 MMOL/L (ref 100–108)
CO2 SERPL-SCNC: 30 MMOL/L (ref 21–32)
CREAT SERPL-MCNC: 1.04 MG/DL (ref 0.6–1.3)
ERYTHROCYTE [DISTWIDTH] IN BLOOD BY AUTOMATED COUNT: 14.7 % (ref 11.6–15.1)
GFR SERPL CREATININE-BSD FRML MDRD: 79 ML/MIN/1.73SQ M
GLUCOSE SERPL-MCNC: 159 MG/DL (ref 65–140)
GLUCOSE SERPL-MCNC: 162 MG/DL (ref 65–140)
GLUCOSE SERPL-MCNC: 179 MG/DL (ref 65–140)
GLUCOSE SERPL-MCNC: 209 MG/DL (ref 65–140)
GLUCOSE SERPL-MCNC: 238 MG/DL (ref 65–140)
HCT VFR BLD AUTO: 26 % (ref 36.5–49.3)
HGB BLD-MCNC: 8.3 G/DL (ref 12–17)
MAGNESIUM SERPL-MCNC: 2 MG/DL (ref 1.6–2.6)
MCH RBC QN AUTO: 28.1 PG (ref 26.8–34.3)
MCHC RBC AUTO-ENTMCNC: 31.9 G/DL (ref 31.4–37.4)
MCV RBC AUTO: 88 FL (ref 82–98)
PLATELET # BLD AUTO: 326 THOUSANDS/UL (ref 149–390)
PMV BLD AUTO: 9.4 FL (ref 8.9–12.7)
POTASSIUM SERPL-SCNC: 4.6 MMOL/L (ref 3.5–5.3)
RBC # BLD AUTO: 2.95 MILLION/UL (ref 3.88–5.62)
SODIUM SERPL-SCNC: 137 MMOL/L (ref 136–145)
WBC # BLD AUTO: 8.99 THOUSAND/UL (ref 4.31–10.16)

## 2018-04-03 PROCEDURE — 85027 COMPLETE CBC AUTOMATED: CPT | Performed by: PHYSICIAN ASSISTANT

## 2018-04-03 PROCEDURE — 97530 THERAPEUTIC ACTIVITIES: CPT

## 2018-04-03 PROCEDURE — 80048 BASIC METABOLIC PNL TOTAL CA: CPT | Performed by: PHYSICIAN ASSISTANT

## 2018-04-03 PROCEDURE — 82948 REAGENT STRIP/BLOOD GLUCOSE: CPT

## 2018-04-03 PROCEDURE — 99232 SBSQ HOSP IP/OBS MODERATE 35: CPT | Performed by: UROLOGY

## 2018-04-03 PROCEDURE — 99024 POSTOP FOLLOW-UP VISIT: CPT | Performed by: THORACIC SURGERY (CARDIOTHORACIC VASCULAR SURGERY)

## 2018-04-03 PROCEDURE — 83735 ASSAY OF MAGNESIUM: CPT | Performed by: PHYSICIAN ASSISTANT

## 2018-04-03 PROCEDURE — 99232 SBSQ HOSP IP/OBS MODERATE 35: CPT | Performed by: INTERNAL MEDICINE

## 2018-04-03 PROCEDURE — 97535 SELF CARE MNGMENT TRAINING: CPT

## 2018-04-03 PROCEDURE — 97110 THERAPEUTIC EXERCISES: CPT

## 2018-04-03 RX ADMIN — FUROSEMIDE 20 MG: 10 INJECTION, SOLUTION INTRAMUSCULAR; INTRAVENOUS at 18:01

## 2018-04-03 RX ADMIN — INSULIN LISPRO 2 UNITS: 100 INJECTION, SOLUTION INTRAVENOUS; SUBCUTANEOUS at 11:39

## 2018-04-03 RX ADMIN — COLLAGENASE SANTYL: 250 OINTMENT TOPICAL at 11:38

## 2018-04-03 RX ADMIN — INSULIN LISPRO 10 UNITS: 100 INJECTION, SOLUTION INTRAVENOUS; SUBCUTANEOUS at 18:09

## 2018-04-03 RX ADMIN — MIDODRINE HYDROCHLORIDE 10 MG: 5 TABLET ORAL at 09:36

## 2018-04-03 RX ADMIN — INSULIN LISPRO 1 UNITS: 100 INJECTION, SOLUTION INTRAVENOUS; SUBCUTANEOUS at 18:08

## 2018-04-03 RX ADMIN — MIDODRINE HYDROCHLORIDE 10 MG: 5 TABLET ORAL at 11:34

## 2018-04-03 RX ADMIN — AMIODARONE HYDROCHLORIDE 200 MG: 200 TABLET ORAL at 21:24

## 2018-04-03 RX ADMIN — INSULIN LISPRO 1 UNITS: 100 INJECTION, SOLUTION INTRAVENOUS; SUBCUTANEOUS at 21:24

## 2018-04-03 RX ADMIN — ASPIRIN 325 MG: 325 TABLET ORAL at 09:36

## 2018-04-03 RX ADMIN — POTASSIUM CHLORIDE 10 MEQ: 750 TABLET, EXTENDED RELEASE ORAL at 18:01

## 2018-04-03 RX ADMIN — FUROSEMIDE 20 MG: 10 INJECTION, SOLUTION INTRAMUSCULAR; INTRAVENOUS at 09:34

## 2018-04-03 RX ADMIN — AMIODARONE HYDROCHLORIDE 200 MG: 200 TABLET ORAL at 06:46

## 2018-04-03 RX ADMIN — POTASSIUM CHLORIDE 10 MEQ: 750 TABLET, EXTENDED RELEASE ORAL at 09:38

## 2018-04-03 RX ADMIN — FONDAPARINUX SODIUM 2.5 MG: 2.5 INJECTION, SOLUTION SUBCUTANEOUS at 09:36

## 2018-04-03 RX ADMIN — INSULIN LISPRO 10 UNITS: 100 INJECTION, SOLUTION INTRAVENOUS; SUBCUTANEOUS at 09:39

## 2018-04-03 RX ADMIN — INSULIN LISPRO 10 UNITS: 100 INJECTION, SOLUTION INTRAVENOUS; SUBCUTANEOUS at 11:33

## 2018-04-03 RX ADMIN — AMIODARONE HYDROCHLORIDE 200 MG: 200 TABLET ORAL at 13:43

## 2018-04-03 RX ADMIN — INSULIN GLARGINE 12 UNITS: 100 INJECTION, SOLUTION SUBCUTANEOUS at 21:24

## 2018-04-03 RX ADMIN — PANTOPRAZOLE SODIUM 40 MG: 40 TABLET, DELAYED RELEASE ORAL at 06:46

## 2018-04-03 RX ADMIN — INSULIN LISPRO 1 UNITS: 100 INJECTION, SOLUTION INTRAVENOUS; SUBCUTANEOUS at 06:46

## 2018-04-03 RX ADMIN — MIDODRINE HYDROCHLORIDE 10 MG: 5 TABLET ORAL at 18:01

## 2018-04-03 RX ADMIN — ATORVASTATIN CALCIUM 80 MG: 80 TABLET, FILM COATED ORAL at 18:00

## 2018-04-03 RX ADMIN — DOCUSATE SODIUM 100 MG: 100 CAPSULE, LIQUID FILLED ORAL at 18:00

## 2018-04-03 RX ADMIN — TAMSULOSIN HYDROCHLORIDE 0.4 MG: 0.4 CAPSULE ORAL at 18:01

## 2018-04-03 RX ADMIN — LORAZEPAM 0.5 MG: 0.5 TABLET ORAL at 00:23

## 2018-04-03 NOTE — PLAN OF CARE
Problem: PHYSICAL THERAPY ADULT  Goal: Performs mobility at highest level of function for planned discharge setting  See evaluation for individualized goals  Treatment/Interventions: Functional transfer training, LE strengthening/ROM, Therapeutic exercise, Endurance training, Patient/family training, Gait training, Bed mobility, Equipment eval/education  Equipment Recommended: Wheelchair, Mirtha Narrow       See flowsheet documentation for full assessment, interventions and recommendations  Outcome: Progressing  Prognosis: Good  Problem List: Decreased strength, Decreased endurance, Impaired balance, Decreased mobility, Obesity, Orthopedic restrictions  Assessment: Pt cont to gradually improve w/ functional mobility skills and overall tolerance to activities; ambulated further distance w/ rw and (A)x1 + chair follow; pt still remains to be generally weak and deconditioned w/ decreased SBP noted post amb trial; mod (A)x2 was also required for transfers to assure safety and proper technique in light of multiple precautions; LE therex performed in an AAROM mode for the most part; pt remained in NAD and appeared to be comfortable t/o the session; currently, cont recommend rehab upon D/C as pt's overall functional status is below premorbid level; will cont to follow until then  Barriers to Discharge: Inaccessible home environment, Decreased caregiver support     Recommendation: Post acute IP rehab     PT - OK to Discharge: Yes (to rehab when medically cleared)    See flowsheet documentation for full assessment

## 2018-04-03 NOTE — PROGRESS NOTES
Progress Note - Cardiothoracic Surgery   [facility] 61 y o  male MRN: [id number]  Unit/Bed#: [facility] 420-01 Encounter: [id number]  Coronary artery disease  S/P coronary artery bypass grafting; POD # 6    24 Hour Events: No events overnight  No complaints  Tolerating diet  Pain controlled  Activity with PT as he can tolerate      Medications:   Scheduled Meds:  Current Facility-Administered Medications:  acetaminophen 650 mg Oral Q4H PRN Shay Davis PA-C   amiodarone 200 mg Oral Q8H [facility] Shay Davis PA-C   aspirin 325 mg Oral Daily Shay Davis PA-C   atorvastatin 80 mg Oral Daily With NUNO Singh   bisacodyl 10 mg Rectal Daily PRN Estevan Sim PA-C   collagenase  Topical Daily Debbi Grimaldo DPM   docusate sodium 100 mg Oral BID Bonnie Sewell PA-C   fondaparinux 2 5 mg Subcutaneous Daily Shay Davis PA-C   furosemide 20 mg Intravenous BID (diuretic) Ambar Price PA-C   insulin glargine 12 Units Subcutaneous HS Kathrin Fairbanks MD   insulin lispro 1-5 Units Subcutaneous HS Kathrin Fairbanks MD   insulin lispro 1-6 Units Subcutaneous TID AC Kathrin Fairbanks MD   insulin lispro 10 Units Subcutaneous TID With Meals Shira Alvarado DO   LORazepam 0 5 mg Oral Q8H PRN Ambar Price PA-C   midodrine 10 mg Oral TID With Meals Bonnie Sewell PA-C   ondansetron 4 mg Intravenous Q6H PRN Shay Davis PA-C   oxyCODONE-acetaminophen 1 tablet Oral Q4H PRN Estevan Sim PA-C   oxyCODONE-acetaminophen 2 tablet Oral Q6H PRN Shay Davis PA-C   pantoprazole 40 mg Oral Early Morning Shay Davis PA-C   polyethylene glycol 17 g Oral Daily Shay Davis PA-C   potassium chloride 10 mEq Oral BID Bonnie Sewell PA-C   tamsulosin 0 4 mg Oral Daily With Irsi Sewell PA-C   temazepam 15 mg Oral HS PRN Bonnie Sewell PA-C     Continuous Infusions:   PRN Meds:   acetaminophen    bisacodyl    LORazepam    ondansetron   oxyCODONE-acetaminophen    oxyCODONE-acetaminophen    temazepam    Vitals:   Vitals:    04/03/18 0003 04/03/18 0333 04/03/18 0600 04/03/18 0716   BP: 120/57 121/58  115/57   BP Location: Left arm Right arm  Right arm   Pulse: 75 75  69   Resp: 18 18 18   Temp: 98 °F (36 7 °C) 97 6 °F (36 4 °C)  98 3 °F (36 8 °C)   TempSrc: Oral Oral  Oral   SpO2: 98% 95%  96%   Weight:   120 kg (264 lb 5 3 oz)    Height:           Telemetry: NSR; Heart Rate: 69    Respiratory:   SpO2: SpO2: 96 %; Room Air    Intake/Output:   I/O       04/01 0701 - 04/02 0700 04/02 0701 - 04/03 0700 04/03 0701 - 04/04 0700    P  O  500 810     I V  (mL/kg) 293 (2 6)      Total Intake(mL/kg) 793 (7) 810 (6 8)     Urine (mL/kg/hr) 2355 (0 9) 1150 (0 4)     Stool 0 (0) 0 (0)     Chest Tube       Total Output 2355 1150      Net -1562 1 -340             Unmeasured Stool Occurrence 2 x 1 x           Weights:   Weight (last 2 days)     Date/Time   Weight    04/03/18 0600  120 (264 33)    04/01/18 0600  113 (249 56)            Admit weight: 121    Results:     Results from last 7 days  Lab Units 04/03/18  0542 04/02/18  0441 04/01/18  0424   WBC Thousand/uL 8 99 8 00 10 07   HEMOGLOBIN g/dL 8 3* 8 2* 8 3*   HEMATOCRIT % 26 0* 26 0* 26 9*   PLATELETS Thousands/uL 326 258 248       Results from last 7 days  Lab Units 04/03/18  0542 04/02/18  0441 04/01/18  0424   SODIUM mmol/L 137 133* 135*   POTASSIUM mmol/L 4 6 4 1 4 0   CHLORIDE mmol/L 101 99* 103   CO2 mmol/L 30 31 30   BUN mg/dL 14 15 14   CREATININE mg/dL 1 04 0 94 0 94   GLUCOSE RANDOM mg/dL 159* 143* 114   CALCIUM mg/dL 8 3 8 1* 7 8*         Point of care glucose: 110-192     Studies:  No new studies     Invasive Lines/Tubes:  Invasive Devices     Peripheral Intravenous Line            Peripheral IV 04/02/18 Right Forearm less than 1 day    Peripheral IV 04/02/18 Right Hand less than 1 day          Drain            Urethral Catheter Temperature probe 16 Fr  2 days                Physical Exam:    HEENT/NECK:  PERRLA  No jugular venous distention  Cardiac: Regular rate and rhythm  No rubs/murmurs/gallops  Pulmonary:  Breath sounds slightly diminished at the bases bilaterally  Abdomen:  Non-tender, Non-distended  Positive bowel sounds  Incisions: Sternum is stable  Incision is clean, dry, and intact  Saphenectomy incision is clean, dry, and intact  Lower extremities: Extremities warm/dry  Radial/PT/DP pulses 2+ bilaterally  1+ edema B/L  Neuro: Alert and oriented X 3  Sensation is grossly intact  No focal deficits  Skin: Warm/Dry, without rashes or lesions  Assessment:  Patient Active Problem List   Diagnosis    Anxiety and depression    Benign essential HTN    Diabetic neuropathy, type II diabetes mellitus ([facility] [address] [facility]    Hyperlipidemia    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 ([facility])    Vitamin D deficiency    PAD (peripheral artery disease) ([facility])    Non-healing wound of left heel    Diabetic gastroparesis ([facility])    Bilateral lower extremity edema    Class 2 obesity due to excess calories with serious comorbidity and body mass index (BMI) of 35 0 to 35 9 in adult    Triple vessel coronary artery disease    Acute on chronic diastolic heart failure ([facility])    Other constipation    S/P CABG x 3    Diabetic autonomic neuropathy associated with type 2 diabetes mellitus ([facility])    Hyponatremia       Coronary artery disease  S/P coronary artery bypass grafting; POD # 6    Plan:    1  Cardiac:   NSR; HR/BP well-controlled on midodrine    No beta blocker therapy due to hypotension  Continue ASA and Statin therapy  Epicardial pacing wires out  Patient no longer has central IV access   Continue DVT prophylaxis    2  Pulmonary:   Good Room air oxygen saturation: Continue incentive spirometry/Coughing/Deep breathing exercises  Chest tubes have been discontinued    3   Renal:   Intake/Output net: -340 mL/24 hours  Continue diuresis   Lasix 20 mg IV BID  Potassium Chloride 10 mEq PO BID  Post op Creatinine stable; Follow up labs prn    4  Neuro:  Neurologically intact; No active issues  Incisional pain well-controlled; Continue prn Percocet    5  GI:  Tolerating clear liquid diet, without evidence of dysphagia; Initiate TLC 2 3 gm sodium diet with consistent carbohydrate modifier  Maintain 1800 mL daily fluid restriction   Continue stool softeners and prn suppository  Continue GI prophylaxis    6  Endo:    History of diabetes; Continue SQ insulin therapy as directed by endocrinology physician  Insulin administration teaching for home therapy ordered    7  Hematology:   Post-operative acute blood loss anemia; Hemoglobin and hematocrit stable; trend prn    8   Disposition:  Rehab placement today     VTE Pharmacologic Prophylaxis: Fondaparinux (Arixtra)  VTE Mechanical Prophylaxis: sequential compression device    Collaborative rounds completed with Cherylene Maiers, M D , and Alicia Simon RN    SIGNATURE: Chetan Bentley  DATE: April 3, 2018  TIME: 8:31 AM

## 2018-04-03 NOTE — PROGRESS NOTES
Progress Note - Lisa Henderson 61 y o  male MRN: [id number]    Unit/Bed#: [facility] 420-01 Encounter: [id number]      Assessment:  Mr Roxie Kaur is a comorbid 60 y/o male with recent urinary retention status post Stout catheter placement who reports longstanding history of lower urinary tract symptoms  Flomax has been added to medication profile  Discharge planning is underway with strong recommendation for short term rehab  Plan:  Maintain Stout catheter for the time being  Discharge with Stout in place and proceed with outpatient void trial at short term rehab within 5 days  Continue Flomax on discharge  Refer to after visit summary for trial of void instructions to be followed by skilled nursing at facility  Patient will require full urologic workup as an outpatient  Our office will contact patient with new appointment date and time  No further  intervention is indicated during this hospital stay  Will sign off  Please do not hesitate to contact our office with any questions or new  concerns  Subjective:   No complaint    Objective:     Vitals: Blood pressure 108/60, pulse 73, temperature 97 5 °F (36 4 °C), temperature source Oral, resp  rate 18, height 6' 1" (1 854 m), weight 120 kg (264 lb 5 3 oz), SpO2 97 %  ,Body mass index is 34 87 kg/m²        Intake/Output Summary (Last 24 hours) at 04/03/18 1255  Last data filed at 04/03/18 0847   Gross per 24 hour   Intake              750 ml   Output              900 ml   Net             -150 ml       Physical Exam: General appearance: alert and oriented, in no acute distress  Head: Normocephalic, without obvious abnormality, atraumatic  Lungs: clear to auscultation bilaterally  Heart: regular rate and rhythm, S1, S2 normal, no murmur, click, rub or gallop  Abdomen: soft, non-tender; bowel sounds normal; no masses,  no organomegaly  Extremities: extremities normal, warm and well-perfused; no cyanosis, clubbing, or edema  Pulses: 2+ and symmetric  Neurologic: Grossly normal  Stout patent for clear yellow urine     Invasive Devices     Peripheral Intravenous Line            Peripheral IV 04/02/18 Right Forearm 1 day    Peripheral IV 04/02/18 Right Hand 1 day          Drain            Urethral Catheter Temperature probe 16 Fr  2 days              Lab Results   Component Value Date    WBC 8 99 04/03/2018    HGB 8 3 (L) 04/03/2018    HCT 26 0 (L) 04/03/2018    MCV 88 04/03/2018     04/03/2018     Lab Results   Component Value Date    GLUCOSE 159 (H) 04/03/2018    CALCIUM 8 3 04/03/2018     04/03/2018    K 4 6 04/03/2018    CO2 30 04/03/2018     04/03/2018    BUN 14 04/03/2018    CREATININE 1 04 04/03/2018       Lab, Imaging and other studies: I have personally reviewed pertinent reports

## 2018-04-03 NOTE — PHYSICAL THERAPY NOTE
PHYSICAL THERAPY NOTE          Patient Name: Yakov Kitchen  PBWHL'T Date: 4/3/2018     04/03/18 0943   Pain Assessment   Pain Assessment No/denies pain   Restrictions/Precautions   LLE Weight Bearing Per Order PWB  (in heel offloading shoe LLE)   Braces or Orthoses Other (Comment)  ((L) LE Globoped shoe (heel offloading shoe LLE))   Other Precautions Cardiac/sternal;Fall Risk;Telemetry;Multiple lines  (BP)   General   Chart Reviewed Yes   Additional Pertinent History cleared for Tx session (spoke to nsg)   Response to Previous Treatment Patient with no complaints from previous session  Cognition   Overall Cognitive Status WFL   Arousal/Participation Alert   Attention Attends with cues to redirect   Orientation Level Oriented to person;Oriented to place;Oriented to situation   Memory Within functional limits  (reviewed sternal and WB precautions)   Following Commands Follows one step commands without difficulty   Subjective   Subjective Alert; in the chair; denies dizziness at present; agreeable to mobilize;   Transfers   Sit to Stand 3  Moderate assistance   Additional items Assist x 2; Increased time required;Verbal cues  (3 trials)   Stand to Sit 3  Moderate assistance   Additional items Assist x 2;Verbal cues  (3 trials)   Ambulation/Elevation   Gait pattern Excessively slow; Step to; Inconsistent gerard;Decreased L stance   Gait Assistance 3  Moderate assist   Additional items Assist x 1;Verbal cues; Tactile cues  (stand by (A) of 2nd and chair follow)   Assistive Device Rolling walker  (would benefit from a wider/bariatric type of a walker)   Distance 10 ft; limited by endurance   Stair Management Assistance Not tested   Balance   Static Sitting Fair +   Static Standing Poor +   Ambulatory Poor   Endurance Deficit   Endurance Deficit Description Sitting BP = 94/51; standing BP (2 min) = 99/51; pt remained asymptomatic; post amb, BP = 89/54; RN informed; pt reports some weakness; RPE of 5 (on 0-10 scale)   Activity Tolerance   Activity Tolerance Patient limited by fatigue;Treatment limited secondary to medical complications (Comment)  (decreased BP post amb)   Nurse Made Aware spoke to Marin Coker RN   Exercises   Hip Abduction Sitting;10 reps;AAROM; Bilateral  (2 sets)   Knee AROM Long Arc Quad Standing;10 reps;AAROM;AROM; Bilateral  (2 sets)   Ankle Pumps Sitting;10 reps;AAROM; Right  ((R) only; 2 sets)   Equipment Use   Comments OTR w/ the pt post session   Assessment   Prognosis Good   Problem List Decreased strength;Decreased endurance; Impaired balance;Decreased mobility;Obesity;Orthopedic restrictions   Assessment Pt cont to gradually improve w/ functional mobility skills and overall tolerance to activities; ambulated further distance w/ rw and (A)x1 + chair follow; pt still remains to be generally weak and deconditioned w/ decreased SBP noted post amb trial; mod (A)x2 was also required for transfers to assure safety and proper technique in light of multiple precautions; LE therex performed in an AAROM mode for the most part; pt remained in NAD and appeared to be comfortable t/o the session; currently, cont recommend rehab upon D/C as pt's overall functional status is below premorbid level; will cont to follow until then  Barriers to Discharge Inaccessible home environment;Decreased caregiver support   Goals   LTG Expiration Date 04/12/18   Treatment Day 3   Plan   Treatment/Interventions Functional transfer training;LE strengthening/ROM; Therapeutic exercise; Endurance training;Bed mobility;Gait training;Spoke to nursing;OT   Progress Progressing toward goals   PT Frequency 5x/wk   Recommendation   Recommendation Post acute IP rehab   Equipment Recommended Walker  (w/ (A))   PT - OK to Discharge Yes  (to rehab when medically cleared)       Bhaskar Draper, PT

## 2018-04-03 NOTE — OCCUPATIONAL THERAPY NOTE
[address] Progress Note     Patient Name: Goldie Moreno  Today's Date: 4/3/2018  Problem List  Patient Active Problem List   Diagnosis    Anxiety and depression    Benign essential HTN    Diabetic neuropathy, [facility] II diabetes mellitus [facility]    Hyperlipidemia    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 ([facility] )    Vitamin D deficiency    PAD (peripheral artery disease) ([facility] )    Non-healing wound of left heel    Diabetic gastroparesis ([facility] )    Bilateral lower extremity edema    Class 2 obesity due to excess calories with serious comorbidity and body mass index (BMI) of 35 0 to 35 9 in adult    Triple vessel coronary artery disease    Acute on chronic diastolic heart failure (HCC)    Other constipation    S/P CABG x 3    Diabetic autonomic neuropathy associated with type 2 diabetes mellitus ([facility] )    Hyponatremia         04/03/18 1004   Restrictions/Precautions   Weight Bearing Precautions Per Order Yes   LLE Weight Bearing Per Order PWB   Braces or Orthoses Other (Comment)  (globoped shoe)   Other Precautions Cardiac/sternal;WBS; Telemetry; Fall Risk  (orthostatic hypotension )   Pain Assessment   Pain Assessment No/denies pain   Pain Score No Pain   Pain Rating: FLACC (Rest) - Face 0   Pain Rating: FLACC (Rest) - Legs 0   Pain Rating: FLACC (Rest) - Activity 0   Pain Rating: FLACC (Rest) - Cry 0   Pain Rating: FLACC (Rest) - Consolability 0   Score: FLACC (Rest) 0   Pain Rating: FLACC (Activity) - Face 1   Pain Rating: FLACC (Activity) - Legs 0   Pain Rating: FLACC (Activity) - Activity 0   Pain Rating: FLACC (Activity) - Cry 0   Pain Rating: FLACC (Activity) - Consolability 1   Score: FLACC (Activity) 2   ADL   Where Assessed Chair   Grooming Assistance 4  Minimal Assistance   Grooming Deficit Setup; Increased time to complete;Verbal cueing;Wash/dry face   Grooming Comments Pt performed grooming seated in chair w/ set up   Pt requires increased time to complete, verbal cues to initaite and sequence and assist for thoroughness   UB Bathing Assistance 4  Minimal Assistance   UB Bathing Deficit Setup;Verbal cueing; Increased time to complete   UB Bathing Comments Pt performed UB bathing seated in chair w/ set up  Pt requires increased time to complete, verbal cues to initaite and sequence and assist for thoroughness 2* decreased functional reach and significant fatigue    LB Bathing Assistance 3  Moderate Assistance   LB Bathing Deficit Setup;Verbal cueing; Increased time to complete; Buttocks;Right lower leg including foot; Left lower leg including foot   LB Bathing Comments Pt performed LB bathing seated in chair w/ mod A requiring A to bath B/L lower extremities lower portions and buttocks  Pt able to perform LB bathing to B/L LE upper portions and anterior perineal area w/ verbal cuing to sequence task   UB Dressing Assistance 3  Moderate Assistance   UB Dressing Deficit Setup;Verbal cueing; Increased time to complete;Pull around back;Pull down in back; Fasteners;Pull over head   UB Dressing Comments Pt performed UB dressing of back open hospital gown w/ mod A  Pt requires A to manage fasteners, pull gown around to front and pull around to back, and to pull over head  Pt able to thread/unthread B/L UB w/ significantly increased time and verbal cues to sequence  LB Dressing Assistance 1  Total Assistance   LB Dressing Deficit Setup;Verbal cueing; Increased time to complete; Requires assistive device for steadying; Don/doff R sock; Don/doff L shoe   LB Dressing Comments Pt requires total A to don/doff R sock and L globoped shoe  Pt would require A to thread B/L LE into pants/underpants 2* decreased functional forward reach and would require A to pull pants up over hips 2* poor standing tolerance and balance  Transfers   Sit to Stand 3  Moderate assistance   Additional items Assist x 2; Increased time required;Verbal cues   Stand to Sit 3  Moderate assistance Additional items Assist x 2; Increased time required;Verbal cues   Additional Comments Pt performed sit to stand tranfsers x3 trials w/ mod A x2 for force production, steadying/balance, verbal cues for safe hand/ foot placement and technique and A for safety 2* dizziness and decreased BP  Functional Mobility   Functional Mobility 3  Moderate assistance   Additional Comments For short in room distances w/ A x2 for balance and A x3rd for chair follow  Additional items Rolling walker   Cognition   Overall Cognitive Status Impaired   Arousal/Participation Alert; Responsive; Cooperative   Attention Attends with cues to redirect   Memory Within functional limits   Following Commands Follows one step commands with increased time or repetition   Comments Pt is alert and cooperative t/o session  Pt able to recall precautions for cardiac/sternal and for WBS  Pt requires increased time and cues to attend and follow directions  Pt required significant cuing t/o session to sequence ADLs  Activity Tolerance   Activity Tolerance Patient limited by fatigue;Treatment limited secondary to medical complications (Comment)   Medical Staff Made Aware PT Raul and YI Paulino    Assessment   Assessment Pt participated in OT tx session focusing on morning ADL routine  Pt performed seated in chair 2* significant fatigue, decreased standing tolerance, WBS/precautions, cardiac/sternal precautions, orthostatic hypotension and for safety, (please see above for details)  Pt performed grooming and UB bathing w/ min A, UB dressing w/ mod A, LB bathing w/ mod A and LB dressing w/ total A  Pt performed sit to stand tranfsers x3 trials w/ mod A x2 for force production, steadying/balance, verbal cues for safe hand/ foot placement and technique and A for safety 2* dizziness and decreased BP  Pt required significantly increased time, rest breaks and verbal cues to perform and sequence ADL tasks 2* impaired attention and significant fatigue   Pt reporting "It is just so much effort" during UB bathing task  Pt performance and cognition is significantly improved from initial evaluation however pt remains significanly limited in all areas of occupation 2* above stated deficits  At this time continue to recommend inpatient rehab upon D/C  Will continue to follow and address previously stated goals  Plan   Treatment Interventions ADL retraining;Functional transfer training;UE strengthening/ROM; Endurance training;Cognitive reorientation;Patient/family training;Equipment evaluation/education; Compensatory technique education;Continued evaluation; Energy conservation; Activityengagement; Fine motor coordination activities   Goal Expiration Date 04/12/18   Treatment Day 2   OT Frequency 3-5x/wk   Recommendation   OT Discharge Recommendation Short Term Rehab   OT - OK to Discharge Yes   Barthel Index   Feeding 10   Bathing 0   Grooming Score 0   Dressing Score 5   Bladder Score 0   Bowels Score 10   Toilet Use Score 5   Transfers (Bed/Chair) Score 5   Mobility (Level Surface) Score 0   Stairs Score 0   Barthel Index Score 35   Modified [place] Scale   Modified [place] Scale 4         Ramírez Santo MS, OTR/L

## 2018-04-03 NOTE — SOCIAL WORK
[facility] received authorization for pt to be admitted there on 4/4/18  Arranged for [facility] to  at 1330  Pt  And wife aware and questions answered  RN informed and paperwork prepared

## 2018-04-03 NOTE — PROGRESS NOTES
Progress Note - Mick Logan 61 y o  male MRN: [id number]    Unit/Bed#: [facility] 420-01 Encounter: [id number]      CC: diabetes f/u    Subjective:   Mick Logan is a 61y o  year old male with type 2 diabetes  Feels well  No complaints  No hypoglycemia  He had hyperglycemia before lunch today due to having juice for breakfast     Objective:     Vitals: Blood pressure 109/56, pulse 73, temperature (!) 97 3 °F (36 3 °C), temperature source Oral, resp  rate 18, height 6' 1" (1 854 m), weight 120 kg (264 lb 5 3 oz), SpO2 96 %  ,Body mass index is 34 87 kg/m²  Intake/Output Summary (Last 24 hours) at 04/03/18 1632  Last data filed at 04/03/18 1500   Gross per 24 hour   Intake              900 ml   Output              900 ml   Net                0 ml       Physical Exam:  General Appearance: awake, appears stated age and cooperative  Head: Normocephalic, without obvious abnormality, atraumatic  Extremities: moves all extremities  Skin: Skin color and temperature normal    Pulm: no labored breathing    Lab, Imaging and other studies: I have personally reviewed pertinent reports  POC Glucose   Date Value   04/03/2018 238 mg/dl (H)   04/03/2018 162 mg/dl (H)   04/02/2018 192 mg/dl (H)   04/02/2018 170 mg/dl (H)   04/02/2018 167 mg/dl (H)   04/02/2018 179 mg/dl (H)   04/02/2018 148 mg/dl (H)   04/01/2018 109 mg/dl   04/01/2018 110 mg/dl   04/01/2018 161 mg/dl (H)   01/11/2018 130   09/07/2016 84       Assessment:  diabetes with hyperglycemia and coronary artery disease s/p CABG    Plan:  1  Type 2 diabetes with hyperglycemia-blood sugar before lunch was elevated due to having juice for breakfast   Educated patient on the relationship of drinking juice and hyperglycemia  He agrees to avoid juice of possible  He tells me that he will not be able to afford his insulin when he gets out of rehab  We can changes Lantus to basaglar and his Humalog to Apidra    We have given him co-pay cards for both of these medications  At discharge, the patient should be given a prescription for jardiance 25 mg per day in order to decrease the cardiovascular mortality by 40%  He will receive a co-pay card for Jardiance as well  2   Coronary disease status post CABG-he is recovering well and will be going to rehab tomorrow  Portions of the record may have been created with voice recognition software

## 2018-04-04 VITALS
TEMPERATURE: 98.3 F | RESPIRATION RATE: 18 BRPM | BODY MASS INDEX: 33.02 KG/M2 | WEIGHT: 249.12 LBS | HEART RATE: 74 BPM | SYSTOLIC BLOOD PRESSURE: 116 MMHG | OXYGEN SATURATION: 97 % | HEIGHT: 73 IN | DIASTOLIC BLOOD PRESSURE: 59 MMHG

## 2018-04-04 LAB
GLUCOSE SERPL-MCNC: 191 MG/DL (ref 65–140)
GLUCOSE SERPL-MCNC: 237 MG/DL (ref 65–140)

## 2018-04-04 PROCEDURE — 99024 POSTOP FOLLOW-UP VISIT: CPT | Performed by: THORACIC SURGERY (CARDIOTHORACIC VASCULAR SURGERY)

## 2018-04-04 PROCEDURE — 82948 REAGENT STRIP/BLOOD GLUCOSE: CPT

## 2018-04-04 RX ORDER — POTASSIUM CHLORIDE 750 MG/1
10 TABLET, EXTENDED RELEASE ORAL DAILY
Refills: 0
Start: 2018-04-04 | End: 2018-05-23

## 2018-04-04 RX ORDER — DOCUSATE SODIUM 100 MG/1
100 CAPSULE, LIQUID FILLED ORAL 2 TIMES DAILY
Qty: 10 CAPSULE | Refills: 0
Start: 2018-04-04 | End: 2018-06-12 | Stop reason: SDUPTHER

## 2018-04-04 RX ORDER — OMEPRAZOLE 20 MG/1
20 CAPSULE, DELAYED RELEASE ORAL DAILY
Qty: 30 CAPSULE | Refills: 0
Start: 2018-04-04 | End: 2018-05-23

## 2018-04-04 RX ORDER — OXYCODONE HYDROCHLORIDE AND ACETAMINOPHEN 5; 325 MG/1; MG/1
1 TABLET ORAL EVERY 4 HOURS PRN
Refills: 0
Start: 2018-04-04 | End: 2018-04-14

## 2018-04-04 RX ORDER — ASPIRIN 325 MG
325 TABLET ORAL DAILY
Qty: 30 TABLET | Refills: 2
Start: 2018-04-05 | End: 2018-06-12

## 2018-04-04 RX ORDER — TAMSULOSIN HYDROCHLORIDE 0.4 MG/1
0.4 CAPSULE ORAL
Qty: 30 CAPSULE | Refills: 2
Start: 2018-04-04 | End: 2018-06-12 | Stop reason: SDUPTHER

## 2018-04-04 RX ORDER — ATORVASTATIN CALCIUM 80 MG/1
80 TABLET, FILM COATED ORAL
Qty: 30 TABLET | Refills: 2
Start: 2018-04-04 | End: 2018-06-12 | Stop reason: SDUPTHER

## 2018-04-04 RX ORDER — ACETAMINOPHEN 325 MG/1
650 TABLET ORAL EVERY 6 HOURS PRN
Qty: 30 TABLET | Refills: 2
Start: 2018-04-04 | End: 2018-05-23

## 2018-04-04 RX ORDER — INSULIN GLARGINE 100 [IU]/ML
12 INJECTION, SOLUTION SUBCUTANEOUS
Qty: 5 ML | Refills: 2
Start: 2018-04-04 | End: 2018-05-23

## 2018-04-04 RX ORDER — OXYCODONE HYDROCHLORIDE AND ACETAMINOPHEN 5; 325 MG/1; MG/1
2 TABLET ORAL EVERY 6 HOURS PRN
Refills: 0
Start: 2018-04-04 | End: 2018-04-14

## 2018-04-04 RX ORDER — POLYETHYLENE GLYCOL 3350 17 G/17G
17 POWDER, FOR SOLUTION ORAL DAILY
Qty: 14 EACH | Refills: 0
Start: 2018-04-05 | End: 2018-07-26

## 2018-04-04 RX ORDER — MIDODRINE HYDROCHLORIDE 10 MG/1
10 TABLET ORAL
Qty: 90 TABLET | Refills: 2 | Status: ON HOLD
Start: 2018-04-04 | End: 2018-08-09

## 2018-04-04 RX ORDER — LORAZEPAM 0.5 MG/1
0.5 TABLET ORAL EVERY 8 HOURS PRN
Qty: 30 TABLET | Refills: 0
Start: 2018-04-04 | End: 2018-05-23

## 2018-04-04 RX ADMIN — INSULIN LISPRO 2 UNITS: 100 INJECTION, SOLUTION INTRAVENOUS; SUBCUTANEOUS at 06:30

## 2018-04-04 RX ADMIN — FUROSEMIDE 20 MG: 10 INJECTION, SOLUTION INTRAMUSCULAR; INTRAVENOUS at 09:02

## 2018-04-04 RX ADMIN — MIDODRINE HYDROCHLORIDE 10 MG: 5 TABLET ORAL at 11:43

## 2018-04-04 RX ADMIN — POTASSIUM CHLORIDE 10 MEQ: 750 TABLET, EXTENDED RELEASE ORAL at 09:01

## 2018-04-04 RX ADMIN — ASPIRIN 325 MG: 325 TABLET ORAL at 09:01

## 2018-04-04 RX ADMIN — FONDAPARINUX SODIUM 2.5 MG: 2.5 INJECTION, SOLUTION SUBCUTANEOUS at 09:02

## 2018-04-04 RX ADMIN — DOCUSATE SODIUM 100 MG: 100 CAPSULE, LIQUID FILLED ORAL at 09:12

## 2018-04-04 RX ADMIN — PANTOPRAZOLE SODIUM 40 MG: 40 TABLET, DELAYED RELEASE ORAL at 06:30

## 2018-04-04 RX ADMIN — INSULIN LISPRO 10 UNITS: 100 INJECTION, SOLUTION INTRAVENOUS; SUBCUTANEOUS at 08:00

## 2018-04-04 RX ADMIN — INSULIN LISPRO 10 UNITS: 100 INJECTION, SOLUTION INTRAVENOUS; SUBCUTANEOUS at 11:42

## 2018-04-04 RX ADMIN — MIDODRINE HYDROCHLORIDE 10 MG: 5 TABLET ORAL at 09:01

## 2018-04-04 RX ADMIN — COLLAGENASE SANTYL: 250 OINTMENT TOPICAL at 11:44

## 2018-04-04 RX ADMIN — AMIODARONE HYDROCHLORIDE 200 MG: 200 TABLET ORAL at 06:30

## 2018-04-04 NOTE — PROGRESS NOTES
Progress Note - Cardiology   Yakov Kitchen 61 y o  male MRN: [id number]  Unit/Bed#: [facility] 420-01 Encounter: [id number]  04/04/18  11:00 AM        Subjective/Objective   Chief Complaint: No chief complaint on file  Subjective: Patient denies any complaints  Specifically denies chest pains or shortness of breath    Objective: Comfortable , no distress at the time of exam      Patient Active Problem List   Diagnosis    Anxiety and depression    Benign essential HTN    Diabetic neuropathy, type II diabetes mellitus ([address] )    Hyperlipidemia    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 ([facility])    Vitamin D deficiency    PAD (peripheral artery disease) ([facility])    Non-healing wound of left heel    Diabetic gastroparesis ([facility])    Bilateral lower extremity edema    Class 2 obesity due to excess calories with serious comorbidity and body mass index (BMI) of 35 0 to 35 9 in adult    Triple vessel coronary artery disease    Acute on chronic diastolic heart failure (HCC)    Other constipation    S/P CABG x 3    Diabetic autonomic neuropathy associated with type 2 diabetes mellitus ([facility])    Hyponatremia       Vitals: /59 (BP Location: Right arm)   Pulse 74   Temp 98 3 °F (36 8 °C) (Oral)   Resp 18   Ht 6' 1" (1 854 m)   Wt 113 kg (249 lb 1 9 oz)   SpO2 97%   BMI 32 87 kg/m²     I/O this shift:  In: 200 [P O :200]  Out: -   Wt Readings from Last 3 Encounters:   04/04/18 113 kg (249 lb 1 9 oz)   03/15/18 117 kg (258 lb 9 6 oz)   03/14/18 119 kg (263 lb 3 2 oz)       Intake/Output Summary (Last 24 hours) at 04/04/18 1100  Last data filed at 04/04/18 0802   Gross per 24 hour   Intake              790 ml   Output             1750 ml   Net             -960 ml     I/O last 3 completed shifts:   In: 1160 [P O :1160]  Out: 2300 [Urine:2300]    Invasive Devices     Peripheral Intravenous Line            Peripheral IV 04/02/18 Right Forearm 2 days    Peripheral IV 04/02/18 Right Hand 2 days          Drain            Urethral Catheter Temperature probe 16 Fr  3 days                  Physical Exam:  GEN: Yannick Flatten appears well, alert and oriented x 3, pleasant and cooperative   HEENT: pupils equal, round, and reactive to light; extraocular muscles intact  NECK: supple, no carotid bruits or JVD  HEART: regular rhythm, normal S1 and S2, no murmurs, clicks, gallops or rubs   LUNGS: clear to auscultation bilaterally; no wheezes, rales, or rhonchi   ABDOMEN/GI: normal bowel sounds, soft, no tenderness, no distention  EXTREMITIES/Musculoskeltal: peripheral pulses normal; no clubbing, cyanosis, bilateral lower extremity edema  NEURO: no focal findings   SKIN: normal without suspicious lesions on exposed skin            Lab Results:       Results from last 7 days  Lab Units 04/03/18  0542 04/02/18  0441 04/01/18  0424   WBC Thousand/uL 8 99 8 00 10 07   HEMOGLOBIN g/dL 8 3* 8 2* 8 3*   HEMATOCRIT % 26 0* 26 0* 26 9*   PLATELETS Thousands/uL 326 258 248           Results from last 7 days  Lab Units 04/03/18  0542 04/02/18  0441 04/01/18  0424   SODIUM mmol/L 137 133* 135*   POTASSIUM mmol/L 4 6 4 1 4 0   CHLORIDE mmol/L 101 99* 103   CO2 mmol/L 30 31 30   BUN mg/dL 14 15 14   CREATININE mg/dL 1 04 0 94 0 94   CALCIUM mg/dL 8 3 8 1* 7 8*   GLUCOSE RANDOM mg/dL 159* 143* 114         Imaging: I have personally reviewed pertinent reports      EKG:  No events    Scheduled Meds:    Current Facility-Administered Medications:  acetaminophen 650 mg Oral Q4H PRN Shay Davis PA-C   amiodarone 200 mg Oral Q8H [address] Shay Davis PA-C   aspirin 325 mg Oral Daily Shay Davis PA-C   atorvastatin 80 mg Oral Daily With NUNO Singh   bisacodyl 10 mg Rectal Daily PRN Elizawalter Davis PA-C   collagenase  Topical Daily Debbi Grimaldo DPM   docusate sodium 100 mg Oral BID Bonnie Sewell PA-C   fondaparinux 2 5 mg Subcutaneous Daily Alex Polanco PA-C furosemide 20 mg Intravenous BID (diuretic) Judy Melchor PA-C   insulin glargine 12 Units Subcutaneous HS Linn Espinosa MD   insulin lispro 1-5 Units Subcutaneous HS Linn Espinosa MD   insulin lispro 1-6 Units Subcutaneous TID AC Linn Espinosa MD   insulin lispro 10 Units Subcutaneous TID With Meals Lupe Vargas DO   LORazepam 0 5 mg Oral Q8H PRN Judy Melchor PA-C   midodrine 10 mg Oral TID With Meals Bonnie Sewell PA-C   ondansetron 4 mg Intravenous Q6H PRN Shay Davis PA-C   oxyCODONE-acetaminophen 1 tablet Oral Q4H PRN Thakathe Saab PA-C   oxyCODONE-acetaminophen 2 tablet Oral Q6H PRN Shay Davis PA-C   pantoprazole 40 mg Oral Early Morning Shay Davis PA-C   polyethylene glycol 17 g Oral Daily Shay Davis PA-C   potassium chloride 10 mEq Oral BID Bonnie Sewell PA-C   tamsulosin 0 4 mg Oral Daily With Plattedwaine Sewell PA-C   temazepam 15 mg Oral HS PRN Bonnie Sewell PA-C     Continuous Infusions:       13-year-old with long-standing diabetes, with diabetic neuropathy, autonomic neuropathy, chronic left lower extremity wound, was admitted for the same [facility]-had chest pains, with troponin elevation, with coronary angiography demonstrating triple-vessel coronary artery disease-now status post Coronary artery bypass grafting x 3 with left internal mammary artery to left anterior descending artery, saphenous vein graft to obtuse marginal 2 and saphenous vein graft to right posterior descending artery-postoperative day 7  Plan:    Coronary artery disease, status post CABG as above, doing well, has bilateral lower extremity edema  He will be going for physical rehabilitation  Borderline blood pressures and orthostatic dizziness-preclude usage of beta-blockers    Continue aspirin and statin  No postoperative atrial fibrillation in the last 48 hours    Postoperative volume overload, continue Lasix    Orthostatic dizziness:  Likely secondary to autonomic dysfunction, on midodrine, no further changes to management at this time, echo with preserved LV systolic function    Dyslipidemia:  LDL of 39-March 2018    Counseling / Coordination of Care  Total time spent 20 minutes including teaching and family updates  More than 50% was spent counseling pt and family

## 2018-04-04 NOTE — DISCHARGE SUMMARY
Discharge Summary - Cardiothoracic Surgery   Gayatri Henderson 61 y o  male MRN: [id number]  Unit/Bed#: [facility] 420-01 Encounter: [id number]    Admission Date: 3/23/2018     Discharge Date: 04/04/18    Admitting Diagnosis: CAD (coronary artery disease) [I25 10]    Primary Discharge Diagnosis:   Coronary artery disease  S/P coronary artery bypass grafting;    Secondary Discharge Diagnosis:   CAD, IDDM2 w/ neuropathy & retinopathy, HLD, obesity, orthostatic hypotension, anxiety, PVD w/ nonheeling L heel wound    Attending: KEVIN Lehman  Consulting Physician(s):   Cardiology  Medical/Critical Care  Endocrinology  Podiatry  Urology    Procedures Performed:   Coronary artery bypass grafting x 3 with left internal mammary artery to left anterior descending artery, saphenous vein graft to obtuse marginal 2 and saphenous vein graft to right posterior descending artery    Hospital Course:   3/24: 62 y/o CM presented to [facility] w/ gangrenous L heel from PCP office  Vascular consult w/ popliteal occlusion which was attempted to cross through IR but unsuccessful therefore being w/u for peripheral bypass  On Ceftriaxone for wound infx, afebrile w/ normal WBC & (-) blood cx  Cardiac clearance w/ abnormal stress test then cardiac catheterization with MVCAD  Transferred to [facility] for cardiac surgery evaluation  (+) Plavix 75mg (LD 3/22), (+) Plavix 300mg PO x1 on 3/21   3/24: Consultation completed  Preop orders placed  Plan for CABG Tuesday  3/25: No events overnight  CT chest completed, carotids pending  Surgery now scheduled for Wednesday  3/26: Requesting PT visit for ambulating  done  Carotids being done this morning  3/27: No events  Carotids reported  Pre-op orders placed  Ready for OR tomorrow  3/28: CABGx4  Patient tolerated the procedure well & was transferred post-operatively to the ICU hemodynamically stable on epi 2  Wean to extubate  EKG w/ ST depressions in anterolateral leads present pre-op   PM: Gtts: Sanford @ 110  CI 3 6  Extubated at 1600    3/29: 1 5L given since OR  Gtts: Sanford @ 60 and IV insulin  Delined  On RA  In NSR  CI 2 3, +2L/24h  Wean Sanford, no BB, Lasix 40 daily, endocrinology consult  Keep jacobs today, add Flomax  has urinary stream issues  Transfer to step down  PM: Podiatry consult completed; cleared to ambulate with assistance  Continues to have orthostatic events; Lasix decreased and urine output repleted  3/30: Orthostatic event with standing SBP in 50s, given 500cc with good response  Gtts: Sanford at 40, insulin  Hold BB  Started home midodrine  Lasix 40 IV BID  92% on 2L NC  -1L/24hrs  Hg 8 7 (9 6)  Cr baseline  NSR  DC EPW and jacobs  Keep CTs  Keep as step down, tx to floor  PM: Sanford @ 60   3/31: Kept in ICU for lack of floor beds  No events overnight  Remains on sanford 80 this AM, hold beta blocker, discontinue a line & run off cuff pressures  Maintain insulin gtt per endocrinology  On 2LNC, wean as tolerated  Discontinue CTs & EPWs  Transfer to floor when bed available  4/1: Kept in ICU for lack of floor beds  Urinary retention w/ reinsertion of jacobs catheter overnight, consult urology for leg bag at discharge  Remains on sanford 30, continue to hold beta blocker, wean as tolerated  On 2LNC, wean as tolerated  Transfer to floor when available  4/2: No events overnight  Urology consult for retention  In NSR on RA  No beta blocker due to hypotension on midodrine  D/C TLC  Transfer to telemetry  Rehab tomorrow  4/3: Awaiting rehab  approved for [facility] for pickup at 1:30 PM Wednesday 4/4: Ready for discharge to rehab with jacobs catheter per urology  Instructed to f/u with urology as outpatient  Beta Blocker held for hypotension  Condition at Discharge:   good     Discharge Physical Exam:  HEENT/NECK:  PERRLA  No jugular venous distention  Cardiac: Regular rate and rhythm  No rubs/murmurs/gallops  Pulmonary:  Breath sounds slightly diminished at the bases bilaterally    Abdomen:  Non-tender, Non-distended  Positive bowel sounds  Incisions: Sternum is stable  Incision is clean, dry, and intact     Saphenectomy incision is clean, dry, and intact  Lower extremities: Walking boot on left foot  Extremities warm/dry  Radial/PT/DP pulses 2+ bilaterally  2+ edema B/L  Neuro: Alert and oriented X 3  Sensation is grossly intact  No focal deficits  Skin: Warm/Dry, without rashes or lesions  Discharge Data:    Results from last 7 days  Lab Units 04/03/18  0542 04/02/18  0441 04/01/18  0424   WBC Thousand/uL 8 99 8 00 10 07   HEMOGLOBIN g/dL 8 3* 8 2* 8 3*   HEMATOCRIT % 26 0* 26 0* 26 9*   PLATELETS Thousands/uL 326 258 248       Results from last 7 days  Lab Units 04/03/18 0542 04/02/18  0441 04/01/18  0424   SODIUM mmol/L 137 133* 135*   POTASSIUM mmol/L 4 6 4 1 4 0   CHLORIDE mmol/L 101 99* 103   CO2 mmol/L 30 31 30   BUN mg/dL 14 15 14   CREATININE mg/dL 1 04 0 94 0 94   GLUCOSE RANDOM mg/dL 159* 143* 114   CALCIUM mg/dL 8 3 8 1* 7 8*           Discharge instructions/Information to patient and family:   See after visit summary for information provided to patient and family  Mick Logan was educated on restrictions regarding driving and lifting, and techniques of proper incisional care  They were specifically counselled on signs and symptoms of a sternal wound infection, and advised to contact our service immediately should they develop fevers, sweats, chill, redness or drainage at the site of any incisions  Provisions for Follow-Up Care:  See after visit summary for information related to follow-up care and any pertinent home health orders  Disposition:  Short-term rehab at [place] [id number]    Planned Readmission:   No    Discharge Medications:  See after visit summary for reconciled discharge medications provided to patient and family  Mick Logan was provided contact information and scheduled a follow up appointment with KEVIN Oliver    Additionally, they were advised to schedule follow up appointments to be seen by their primary care physician within 7-10 days, their cardiologist within 2-3 weeks, their endocrinologist with 4 weeks, and their urologist within 4 weeks  He was also instructed to make an appointment with Sleep Medicine to be assessed for Obstructive Sleep Apnea  Dr Inga Small with Podiatry will be following up at [facility]  Contact information was provided  The patient was not prescribed beta blockers upon discharge due to hypotension and orthostatic hypotension  The patient was discharged on ongoing diuretic therapy with Torsemide 10 mg, PO QD and Potassium Chloride 10 mEq, PO QD  Narcotic pain medication was prescribed in the form of Percocet  Prior to prescribing, their prescription profile was reviewed on the [facility] of health prescription drug monitoring program     The patient was informed that following their postoperative surgical evaluation, they will be referred to outpatient cardiac rehabilitation  They were counseled that this program is run by specialists who will help them safely strengthen their heart and prevent more heart disease  Cardiac rehabilitation will include exercise, relaxation, stress management, and heart-healthy nutrition  Caregivers will also check to make sure their medication regimen is working  I spent 30 minutes discharging the patient  This time was spent on the day of discharge  I had direct contact with the patient on the day of discharge  Additional documentation is required if more than 30 minutes were spent on discharge       SIGNATURE: KEVIN Lino PA-C  DATE: April 4, 2018  TIME: 12:38 PM

## 2018-04-04 NOTE — PROGRESS NOTES
Progress Note - Cardiothoracic Surgery   Tri Valera 61 y o  male MRN: [id number]  Unit/Bed#: [facility] 420-01 Encounter: [id number]  Coronary artery disease  S/P coronary artery bypass grafting; POD # 7      24 Hour Events: No over night events  No complaints this morning  Ready for discharge to rehab today at 1:30pm   Keep jacobs per urology with voiding trial at rehab and f/u office visit to be scheduled  Beta blocker held due to hypotension      Medications:   Scheduled Meds:  Current Facility-Administered Medications:  acetaminophen 650 mg Oral Q4H PRN Shay Davis PA-C   amiodarone 200 mg Oral Q8H [address] Shay Davis PA-C   aspirin 325 mg Oral Daily Shay Davis PA-C   atorvastatin 80 mg Oral Daily With NUNO Singh   bisacodyl 10 mg Rectal Daily PRN Jaun Barrientos PA-C   collagenase  Topical Daily Debbi Grimaldo DPM   docusate sodium 100 mg Oral BID Bonnie Sewell PA-C   fondaparinux 2 5 mg Subcutaneous Daily Shay Davis PA-C   furosemide 20 mg Intravenous BID (diuretic) Agnes Fermin PA-C   insulin glargine 12 Units Subcutaneous HS Ishaan Petty MD   insulin lispro 1-5 Units Subcutaneous HS Ishaan Petty MD   insulin lispro 1-6 Units Subcutaneous TID AC Ishaan Petty MD   insulin lispro 10 Units Subcutaneous TID With Meals Baron Lower, DO   LORazepam 0 5 mg Oral Q8H PRN Agnes Fermin PA-C   midodrine 10 mg Oral TID With Meals Bonnie Sewell PA-C   ondansetron 4 mg Intravenous Q6H PRN Shay Davis PA-C   oxyCODONE-acetaminophen 1 tablet Oral Q4H PRN Jaun Barrientos PA-C   oxyCODONE-acetaminophen 2 tablet Oral Q6H PRN Shay Davis PA-C   pantoprazole 40 mg Oral Early Morning Shay Davis PA-C   polyethylene glycol 17 g Oral Daily Shay Davis PA-C   potassium chloride 10 mEq Oral BID Bonnie Sewell PA-C   tamsulosin 0 4 mg Oral Daily With Iris Sewell PA-C   temazepam 15 mg Oral HS PRN Mirna Sewell PA-C Continuous Infusions:   PRN Meds:   acetaminophen    bisacodyl    LORazepam    ondansetron    oxyCODONE-acetaminophen    oxyCODONE-acetaminophen    temazepam    Vitals:   Vitals:    04/03/18 2257 04/04/18 0348 04/04/18 0736 04/04/18 0804   BP: 91/55 116/59  134/63   BP Location: Right arm Right arm  Right arm   Pulse: 77 80  72   Resp: 18 18  20   Temp: 97 7 °F (36 5 °C) 98 1 °F (36 7 °C)  99 1 °F (37 3 °C)   TempSrc: Oral Oral  Oral   SpO2: 96% 96%  97%   Weight:   113 kg (249 lb 1 9 oz)    Height:           Telemetry: NSR; Heart Rate: 67    Respiratory:   SpO2: SpO2: 97 %; Room Air    Intake/Output:   I/O       04/02 0701 - 04/03 0700 04/03 0701 - 04/04 0700 04/04 0701 - 04/05 0700    P  O  810 710 200    I V  (mL/kg)       Total Intake(mL/kg) 810 (6 8) 710 (5 9) 200 (1 8)    Urine (mL/kg/hr) 1150 (0 4) 1750 (0 6)     Stool 0 (0) 0 (0)     Total Output 1150 1750      Net -340 -1040 +200           Unmeasured Stool Occurrence 1 x 2 x             Weights:   Weight (last 2 days)     Date/Time   Weight    04/04/18 0736  113 (249 12)    04/03/18 0600  120 (264 33)            Admit weight: 121    Results: No new labs    Results from last 7 days  Lab Units 04/03/18  0542 04/02/18  0441 04/01/18  0424   WBC Thousand/uL 8 99 8 00 10 07   HEMOGLOBIN g/dL 8 3* 8 2* 8 3*   HEMATOCRIT % 26 0* 26 0* 26 9*   PLATELETS Thousands/uL 326 258 248       Results from last 7 days  Lab Units 04/03/18  0542 04/02/18  0441 04/01/18  0424   SODIUM mmol/L 137 133* 135*   POTASSIUM mmol/L 4 6 4 1 4 0   CHLORIDE mmol/L 101 99* 103   CO2 mmol/L 30 31 30   BUN mg/dL 14 15 14   CREATININE mg/dL 1 04 0 94 0 94   GLUCOSE RANDOM mg/dL 159* 143* 114   CALCIUM mg/dL 8 3 8 1* 7 8*         Point of care glucose: 179 - 238    Studies:  No new studies    Invasive Lines/Tubes:  Invasive Devices     Peripheral Intravenous Line            Peripheral IV 04/02/18 Right Forearm 1 day    Peripheral IV 04/02/18 Right Hand 1 day          Drain Urethral Catheter Temperature probe 16 Fr  3 days                Physical Exam:    HEENT/NECK:  PERRLA  No jugular venous distention  Cardiac: Regular rate and rhythm  No rubs/murmurs/gallops  Pulmonary:  Breath sounds slightly diminished at the bases bilaterally  Abdomen:  Non-tender, Non-distended  Positive bowel sounds  Incisions: Sternum is stable  Incision is clean, dry, and intact  Saphenectomy incision is clean, dry, and intact  Lower extremities: Walking boot on left foot  Extremities warm/dry  Radial/PT/DP pulses 2+ bilaterally  2+ edema B/L  Neuro: Alert and oriented X 3  Sensation is grossly intact  No focal deficits  Skin: Warm/Dry, without rashes or lesions  Assessment:  Patient Active Problem List   Diagnosis    Anxiety and depression    Benign essential HTN    Diabetic neuropathy, type II diabetes mellitus ([facility] [address] )    Hyperlipidemia    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 ([facility])    Vitamin D deficiency    PAD (peripheral artery disease) ([facility])    Non-healing wound of left heel    Diabetic gastroparesis ([facility])    Bilateral lower extremity edema    Class 2 obesity due to excess calories with serious comorbidity and body mass index (BMI) of 35 0 to 35 9 in adult    Triple vessel coronary artery disease    Acute on chronic diastolic heart failure ([facility])    Other constipation    S/P CABG x 3    Diabetic autonomic neuropathy associated with type 2 diabetes mellitus ([facility])    Hyponatremia       Coronary artery disease  S/P coronary artery bypass grafting; POD # 7    Plan:    1  Cardiac:   NSR; HR/BP well-controlled on midodrine  Beta blocker held due to hypotension  Continue ASA and Statin therapy  Epicardial pacing wires out  Patient no longer has central IV access   Continue DVT prophylaxis    2   Pulmonary:   Good Room air oxygen saturation: Continue incentive spirometry/Coughing/Deep breathing exercises  Chest tubes have been discontinued    3  Renal:   Intake/Output net: -1040 mL/24 hours  Continue diuresis   Lasix 20 mg IV BID  Potassium Chloride 10 mEq PO BID  Post op Creatinine stable; Follow up labs prn    4  Neuro:  Neurologically intact; No active issues  Incisional pain well-controlled; Continue prn Percocet    5  GI:  Tolerating TLC 2 3 gm sodium diet  Maintain 1800 mL daily fluid restriction   Continue stool softeners and prn suppository  Continue GI prophylaxis    6  Endo:    History of diabetes; Continue SQ insulin therapy as directed by endocrinology physician  Insulin administration teaching for home therapy ordered    7  Hematology:   Post-operative acute blood loss anemia; Hemoglobin and hematocrit stable; trend prn    8   Disposition:  Discharge to rehab today    VTE Pharmacologic Prophylaxis: Fondaparinux (Arixtra)  VTE Mechanical Prophylaxis: sequential compression device    Collaborative rounds completed with KEVIN Patel , and Dora Sauceda RN    SIGNATURE: KEVIN Lino PA-C  DATE: April 4, 2018  TIME: 8:36 AM

## 2018-04-04 NOTE — PROGRESS NOTES
Progress Note - Podiatry  Kennedy Manzanares 61 y o  male MRN: [id number]  Unit/Bed#: [facility] 420-01 Encounter: [id number]    Assessment/Plan:     1   Left foot plantar heel eschar with resolving cellulitis secondary to diabetes mellitus with neuropathy, s/p left heel excisional debridement at bedside (Date of procedure: 3/18/18) by Dr Wilfrido Urbano   2  Left fourth digit DTI  3  DM type 2 with neuropathy-A1c: 9 4%  4  Peripheral arterial disease    -change dressing today with dry sterile dressing as santyl was already applied today  -patient scheduled to be discharged to cardiac rehab, Dr Brian Sykes will see patient at [facility]  -patient should be nonweightbearing to left lower extremity however may be partial weight-bearing and heel offloading shoe when required to do walks for therapy  -dressing changes for rehab have been placed in the discharge instructions  -s/p agram with unsuccessful attempt to cross chronic occlusion left popliteal artery crossing knee joint ; 3 vessel runoff   Vascular recommending left femoral-BKA popliteal bypass after CABG is performed   -medical management per primary team  -patient stable for discharge from podiatric standpoint  Subjective/Objective   Chief Complaint: No chief complaint on file  Subjective: 61 y o  y/o male was seen and evaluated at bedside  Blood pressure 116/59, pulse 74, temperature 98 3 °F (36 8 °C), temperature source Oral, resp  rate 18, height 6' 1" (1 854 m), weight 113 kg (249 lb 1 9 oz), SpO2 97 %  ,Body mass index is 32 87 kg/m²  Invasive Devices     Peripheral Intravenous Line            Peripheral IV 04/02/18 Right Forearm 2 days    Peripheral IV 04/02/18 Right Hand 2 days          Drain            Urethral Catheter Temperature probe 16 Fr  3 days                Physical Exam:   General: Alert, cooperative and no distress  Lungs: Non labored breathing  Heart: Positive S1, S2  Abdomen: Soft, non-tender    Extremity:     Neurovascular status and gross motor function at baseline  Wound continues to be stable without clinical signs of infection  Lab, Imaging and other studies:   CBC: No results found for: WBC, HGB, HCT, MCV, PLT, ADJUSTEDWBC, MCH, MCHC, RDW, MPV, NRBC    Imaging: I have personally reviewed pertinent films in PACS  EKG, Pathology, and Other Studies: I have personally reviewed pertinent reports    VTE Pharmacologic Prophylaxis: Enoxaparin (Lovenox)

## 2018-04-04 NOTE — DISCHARGE INSTRUCTIONS
Please test blood sugars three times daily before meals & once daily before bedtime  Record in a log & bring to follow-up appointment with endocrinologist   May perform voiding trial at rehab prior to Urology follow up appointment  Urology office will call with appointment date & time, and instructions  Sternal Precautions   WHAT YOU NEED TO KNOW:   What are sternal precautions? Sternal precautions are used to help protect your sternum (breastbone) after open chest surgery  Wires are placed during surgery to hold the sternum together as it heals  Sternal precautions help prevent the wires from cutting through the sternum  The precautions also help prevent the sternum from coming apart from an injury, and prevent pain and bleeding  You may need to use the precautions for up to 12 weeks after surgery  Your surgeon will give you specific instructions based on the type of surgery you had  It is important to follow the instructions carefully  An injury to the healing sternum can be life-threatening  What are some general sternal precautions? Start slowly and do more as you get stronger  Pain medicine might make it harder for you to know when to slow down or be careful  Stop immediately if you hear a crunch or pop in your sternum  · Protect your sternum  Hug a pillow to your chest or cross your arms over your chest when you laugh, sneeze, or cough  · Be careful when you get into or out of a chair or bed  Hug a pillow or cross your arms when you stand or sit  Do not twist as you move  Use only your legs to sit and stand  You may need to use a raised toilet seat if you have trouble standing up without using your arms  Your healthcare provider may teach you to use your elbow for support as you move from lying to sitting  · Ask when you may take a bath or shower  You may need to use a bath chair if you have trouble getting into or out of the tub  Do not use a grab bar      · Do not lift or carry anything heavier than 10 pounds  For example, a gallon of milk weighs 8 pounds  · Keep your arms down as much as possible  Do not put your arms out to the side, behind you, or over your head  Do not let anyone pull your arms to help you move or dress  Do not reach for items  · Do not push or pull anything  Examples include a car door or a vacuum   · Do not drive while you are healing  Your surgeon will tell you when it is safe for you to start driving again  How do I care for my surgery wound? Always wash your hands before you care for your wound  Wash your wound as directed  Do not rub the wound as you wash or dry the area  Pat the area dry with a clean towel  Change the bandages as directed and when they get wet or dirty  Do not smoke:  Nicotine can damage blood vessels and make it more difficult to heal  Do not use e-cigarettes or smokeless tobacco in place of cigarettes or to help you quit  They still contain nicotine  Ask your healthcare provider for information if you currently smoke and need help quitting  Call 911 for any of the following:   · You have sudden pain in your sternum and hear a crunch or pop  · You have bleeding that does not stop even after you apply pressure for 5 minutes  When should I seek immediate care? · You hear crunching or grinding in your sternum  · You have signs of an infection, such as a fever, red or warm skin, or pus in the surgery wound  When should I contact my healthcare provider? · You continue to feel pain, even after you take your pain medicine  · You have new or worsening pain, or any pain with movement  · You have questions or concerns about your condition or care  CARE AGREEMENT:   You have the right to help plan your care  Learn about your health condition and how it may be treated  Discuss treatment options with your caregivers to decide what care you want to receive  You always have the right to refuse treatment   The above information is an  only  It is not intended as medical advice for individual conditions or treatments  Talk to your doctor, nurse or pharmacist before following any medical regimen to see if it is safe and effective for you  © 2017 [address] [employer] Information is for End User's use only and may not be sold, redistributed or otherwise used for commercial purposes  All illustrations and images included in CareNotes® are the copyrighted property of [employer] M [employer]  or [employer]  Coronary Artery Bypass Graft   WHAT YOU NEED TO KNOW:   A coronary artery bypass graft (CABG) is open heart surgery to clear blocked arteries in your heart  CABG surgery improves blood flow to your heart by bypassing (sending blood around) the blocked part of an artery  This restores blood flow to your heart and helps prevent a heart attack  DISCHARGE INSTRUCTIONS:   Call 911 for any of the following:   · You feel lightheaded, short of breath, and have chest pain  · You cough up blood  · You have a fast heartbeat that flutters  · You feel like you are going to faint  Seek care immediately if:   · Your arm or leg feels warm, tender, and painful  It may look swollen and red  · You have numbness or tingling in your arms or legs  · You have a severe headache  Contact your healthcare provider if:   · You have a fever higher than 101°F (38 4°C)  · You have gained 2 pounds in 24 hours  · Your wound is red, swollen, or draining pus  · Your signs and symptoms return  · You feel depressed  · You have questions or concerns about your condition or care  Medicines: You may need any of the following:  · Prescription pain medicine  may be given  Ask how to take this medicine safely  · Antiplatelets , such as aspirin, help prevent blood clots  Take your antiplatelet medicine exactly as directed  These medicines make it more likely for you to bleed or bruise   If you are told to take aspirin, do not take acetaminophen or ibuprofen instead  · Cholesterol medicine  helps lower cholesterol and lipid levels in your blood  · Antibiotics  help prevent a bacterial infection  · Heart medicine  helps strengthen and regulate your heartbeat  · Blood pressure medicine  helps lower or control your blood pressure  · Take your medicine as directed  Contact your healthcare provider if you think your medicine is not helping or if you have side effects  Tell him or her if you are allergic to any medicine  Keep a list of the medicines, vitamins, and herbs you take  Include the amounts, and when and why you take them  Bring the list or the pill bottles to follow-up visits  Carry your medicine list with you in case of an emergency  Go to cardiac rehabilitation:  Cardiac rehabilitation (rehab) is a program run by specialists who will help you safely strengthen your heart and prevent more heart disease  This plan includes exercise, relaxation, stress management, and heart-healthy nutrition  Healthcare providers will also check to make sure any medicines you take are working  The plan may also include instructions for when you can drive, return to work, and do other normal daily activities  Care for your wound as directed:  Carefully wash the wound with soap and water  If you do not have a bandage, gently pat the incision dry with a clean towel  If you have a bandage, dry the area and put on a new, clean bandage  Change your bandage if it gets wet or dirty  Prevent another blocked artery:   · Eat heart healthy foods  You may need to eat foods that are low in salt, fat, or cholesterol  Healthy foods include fruits, vegetables, whole-grain breads, low-fat dairy products, beans, lean meats, and fish  Ask your healthcare provider for more information about a heart healthy diet  · Do not smoke  Nicotine and other chemicals in cigarettes and cigars can cause heart and lung damage   Ask your healthcare provider for information if you currently smoke and need help to quit  E-cigarettes or smokeless tobacco still contain nicotine  Talk to your healthcare provider before you use these products  · Maintain a healthy weight  Ask your healthcare provider how much you should weigh  Extra weight can increase the stress on your heart  Ask him to help you create a weight loss plan if you are overweight  Get a flu shot: The flu can be dangerous for a person who has heart disease  To prevent influenza (flu), all adults should get the influenza vaccine every year as soon as it becomes available  Follow up with your healthcare provider as directed:  Write down your questions so you remember to ask them during your visits  © 2017 [employer] Information is for End User's use only and may not be sold, redistributed or otherwise used for commercial purposes  All illustrations and images included in CareNotes® are the copyrighted property of [employer] M [employer]  or [employer]  The above information is an  only  It is not intended as medical advice for individual conditions or treatments  Talk to your doctor, nurse or pharmacist before following any medical regimen to see if it is safe and effective for you  Meal Planning with Diabetes Exchanges   WHAT YOU NEED TO KNOW:   What do I need to know about diabetes exchanges? Exchanges are servings of food that contain similar amounts of carbohydrate, fat, protein, and calories within a food group  The exchanges can be used to develop a healthy meal plan that helps to keep your blood sugar within the recommended levels  A meal plan with the right amount of carbohydrates is especially important  Your blood sugar naturally rises after you eat carbohydrates  Too many carbohydrates in 1 meal or snack can raise your blood sugar level  Carbohydrates are found in starches, fruit, milk, yogurt, and sweets    How do I create a meal plan with exchanges? A dietitian will work with you to develop a healthy meal plan that is right for you  This meal plan will include the amount of exchanges you can have from each food group throughout the day  Follow your meal plan by keeping track of the amount of exchanges you eat for each meal and snack  Your meal plan will be based on your age, weight, blood sugar levels, medicine, and activity level  What are the starch food group exchanges? Each exchange below contains about 15 grams of carbohydrate , 3 grams of protein, 1 gram of fat, and 80 calories  · 1 ounce of white, whole wheat or rye bread (1 slice)    · 1 ounce of bagel (about ¼ of a bagel)    · 1 6-inch flour or corn tortilla or 1 4-inch pancake (about ¼ inch thick)    · ? cup of cooked pasta or rice    · ¾ cup of dry, ready-to-eat cereal with no sugar added     · ½ cup of cooked cereal, such as oatmeal    · 3 mia cracker squares or 8 animal crackers    · 6 saltine-type crackers or     · 3 cups of popcorn or ¾ ounce of pretzels     · Starchy vegetables and cooked legumes:      ¨ ½ cup of corn, green peas, sweet potatoes, or mashed potatoes     ¨ ¼ of a large baked potato     ¨ 1 cup of acorn, butternut squash, or pumpkin     ¨ ½ cup of beans, lentils, or peas (such as hutson, kidney, or black-eyed)    ¨ ? cup of lima beans  What are the fruit group exchanges? Each exchange contains about 15 grams of carbohydrate  and 60 calories  · 1 small (4 ounce) apple, banana orange, or nectarine    · ½ cup of canned or fresh fruit    · ½ cup (4 ounces) of unsweetened fruit juice    · 2 tablespoons of dried fruit  What are the milk group exchanges? Each exchange contains about 12 grams of carbohydrate  and 8 grams of protein  The amount of fat and calories in each serving depends on the type of milk (such as whole, low-fat, or fat-free)    · 1 cup fat-free or low-fat milk    · ¾ cup of plain, nonfat yogurt    · 1 cup fat-free, flavored yogurt with artificial (no calorie) sweetener  What are the non-starchy vegetable group exchanges? Each exchange contains about 5 grams of carbohydrate , 2 grams of protein, and 25 calories  Examples include beets, broccoli, cabbage, carrots, cauliflower, cucumber, mushrooms, tomatoes, and zucchini  · ½ cup of cooked vegetables or 1 cup of raw vegetables     · ½ cup of vegetable juice  What are the meat and meat substitute group exchanges? Each exchange of a lean meat  listed below contains about 7 grams of protein, 0 to 3 grams of fat, and 45 calories  The meat and meat substitutes food group does not contain any carbohydrates  Medium and high-fat meats have more calories  · 1 ounce of chicken or turkey without skin, or 1 ounce of fish (not breaded or fried)     · 1 ounce of lean beef, pork, or lamb     · 1-inch cube or 1 ounce of low-fat cheese     · 2 egg whites or ¼ cup of egg substitute     · ½ cup of tofu  What are the sweets, desserts, and other carbohydrate group exchanges? · Sweets and other desserts:  Each exchange has about 15 grams of carbohydrate   ¨ 1 ounce of brenda food cake or 2-inch square cake (unfrosted)    ¨ 2 small cookies     ¨ ½ cup of sugar-free, fat-free ice cream    ¨ 1 tablespoon of syrup, jam, jelly, table sugar, or honey    · Combination foods:     ¨ 1 cup of an entrée, such as lasagna, spaghetti with meatballs, macaroni and cheese, and chili with beans (each serving counts as 2 carbohydrate exchanges )     ¨ 1 cup of tomato or vegetable beef soup (each serving counts as 1 carbohydrate exchange )  What are the fat group exchanges? Each exchange contains 5 grams of fat and 45 calories    · 1 teaspoon of oil (such as canola, olive, or corn oil)     · 6 almonds or cashews, 10 peanuts, or 4 pecan halves     · 2 tablespoons of avocado     · ½ tablespoon of peanut butter     · 1 teaspoon of regular margarine or 2 teaspoons of low-fat margarine     · 1 teaspoon of regular butter or 1 tablespoon of low-fat butter     · 1 teaspoon of regular mayonnaise or 1 tablespoon of low-fat mayonnaise     · 1 tablespoon of regular salad dressing or 2 tablespoons of low-fat salad dressing  What are free foods? The foods on this list are called free foods because they have very few calories  Free foods usually do not increase your blood sugar if you limit them  · 1 tablespoon of catsup or taco sauce     · ¼ cup of salsa     · 2 tablespoons of sugar-free syrup or 2 teaspoons of light jam or jelly     · 1 tablespoon of fat-free salad dressing     · 4 tablespoons of fat-free margarine or fat-free mayonnaise     · Sugar-free drinks: diet soda, sugar-free drink mixes, or mineral water     · Low-sodium bouillon or fat-free broth     · Mustard     · Seasonings such as spices, herbs, and garlic     · Sugar-free gelatin without added fruit  What other healthy nutrition guidelines should I follow? · Eat more fiber  Choose foods that are good sources of fiber, such as fruits, vegetables, and whole grains  Cereals that contain 5 or more grams of fiber per serving are good sources of fiber  Legumes such as garbanzo, hutson beans, kidney beans, and lentils are also good sources  · Limit fat  Ask your dietitian or healthcare provider how much fat you should eat each day  Choose foods low in fat, saturated fat, trans fat, and cholesterol  Examples include turkey or chicken without the skin, fish, lean cuts of meat, and beans  Low-fat dairy foods, such as low-fat or fat-free milk and low-fat yogurt are also good choices  Omega-3 fatty acids are healthy fats that are found in canola oil, soybean oil and fatty fish  [place], albacore tuna, and sardines are good sources of omega 3 fatty acids  Eat 2 servings of these types of fish each week  Do not eat fried fish  · Limit sugar  Sugar and sweets must be counted toward the carbohydrate exchanges that you can have within your meal plan   Limit sugar and sweets because they are usually also high in calories and fat  Eat smaller portions of sweets by sharing a dessert or asking for a child-size portion at a restaurant  · Limit sodium  (salt) to about 2,300 mg per day  You may need to eat even less sodium if you have certain medical conditions  Foods high in sodium include soy sauce, potato chips, and soup  · Limit alcohol  Ask your healthcare provider if it is safe for you to drink alcohol  If alcohol is safe for you to have, eat a meal when you drink alcohol  If you drink alcohol on an empty stomach, your blood sugar may drop to a low level  Women should limit alcohol to 1 drink per day  Men should limit alcohol to 2 drinks per day  A drink of alcohol is 5 ounces of wine, 12 ounces of beer, or 1½ ounces of liquor  What else can I do to manage my diabetes? · Control your blood sugar level  Test your blood sugar level regularly and keep a record of the results  Ask your healthcare provider when and how often to test your blood sugar  You may need to check your blood sugar level at least 3 times each day  · Talk to your healthcare provider about your weight  Ask if you need to lose weight, and how much you need to lose  If you are overweight, you may need to make other changes to lose weight  Ask your healthcare provider to help you create a weight loss program      · Exercise  can help to control your blood sugar levels and decrease your risk of heart disease  It can also help you lose or maintain your weight  Get at least 30 minutes of exercise, 5 times each week  Do resistance training (using weights) 2 times each week  Do not sit for longer than 90 minutes  Work with your healthcare provider to plan the best exercise program for you  When should I contact my healthcare provider? · You have high blood sugar levels during a certain time of day, or almost all of the time  · You often have low blood sugar levels       · You have questions or concerns about your condition or care   CARE AGREEMENT:   You have the right to help plan your care  Discuss treatment options with your caregivers to decide what care you want to receive  You always have the right to refuse treatment  The above information is an  only  It is not intended as medical advice for individual conditions or treatments  Talk to your doctor, nurse or pharmacist before following any medical regimen to see if it is safe and effective for you  © 2017 [address] [employer] Information is for End User's use only and may not be sold, redistributed or otherwise used for commercial purposes  All illustrations and images included in CareNotes® are the copyrighted property of A [employer] M [employer] Inc  or [employer]  Jacobs Cath Care and void trial instructions---Maintain jacobs catheter to straight drainage  May use leg bag and shower  May flush TID prn using Krystina syringe and 120 ml NSS  May use more saline [place] lou[place] to prevent/treat cath obstruction  Remove catheter on 8th day rehab by 0600 hrs  Bladder scan if no void or less than 200ml in 6hrs  Straight cath for bladder scan >350ml and repeat process  If patient requires straight cath x3 , re-insert jacobs and call for Urologist follow-up  Information above  Jacobs can remain in place for up to 4 weeks at a time  Jacobs placed at [facility] on March 23, 2018  Podiatry instructions:  Please apply santyl, adaptic, 4 x 4 gauze and Kerlix over heel wounds daily and apply Betadine paint to left 4th toe  Patient is to be strictly nonweightbearing to left lower extremity EXCEPT during therapy  He may wear the globopedi heel offloading shoe during therapy     Please follow up Dr Stanton Juarez at [facility]

## 2018-04-04 NOTE — CASE MANAGEMENT
Continued Stay Review    Date: 04/04/2018  Coronary artery disease   S/P coronary artery bypass grafting; POD # 7    Vital Signs: /59 (BP Location: Right arm)   Pulse 74   Temp 98 3 °F (36 8 °C) (Oral)   Resp 18   Ht 6' 1" (1 854 m)   Wt 113 kg (249 lb 1 9 oz)   SpO2 97%   BMI 32 87 kg/m²     Medications:   Scheduled Meds:   Current Facility-Administered Medications:  acetaminophen 650 mg Oral Q4H PRN   amiodarone 200 mg Oral Q8H [place]   aspirin 325 mg Oral Daily   atorvastatin 80 mg Oral Daily With Dinner   bisacodyl 10 mg Rectal Daily PRN   collagenase  Topical Daily   docusate sodium 100 mg Oral BID   fondaparinux 2 5 mg Subcutaneous Daily   furosemide 20 mg Intravenous BID (diuretic)   insulin glargine 12 Units Subcutaneous HS   insulin lispro 1-5 Units Subcutaneous HS   insulin lispro 1-6 Units Subcutaneous TID AC   insulin lispro 10 Units Subcutaneous TID With Meals   LORazepam 0 5 mg Oral Q8H PRN   midodrine 10 mg Oral TID With Meals   ondansetron 4 mg Intravenous Q6H PRN   oxyCODONE-acetaminophen 1 tablet Oral Q4H PRN   oxyCODONE-acetaminophen 2 tablet Oral Q6H PRN   pantoprazole 40 mg Oral Early Morning   polyethylene glycol 17 g Oral Daily   potassium chloride 10 mEq Oral BID   tamsulosin 0 4 mg Oral Daily With Dinner   temazepam 15 mg Oral HS PRN     Abnormal Labs/Diagnostic Results:     Age/Sex: 61 y o  male     Assessment/Plan:   Patient Active Problem List   Diagnosis    Anxiety and depression    Benign essential HTN    Diabetic neuropathy, type II diabetes mellitus ([facility] [address] )    Hyperlipidemia    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 (HCC)    Vitamin D deficiency    PAD (peripheral artery disease) (HCC)    Non-healing wound of left heel    Diabetic gastroparesis (HCC)    Bilateral lower extremity edema    Class 2 obesity due to excess calories with serious comorbidity and body mass index (BMI) of 35 0 to 35 9 in adult    Triple vessel coronary artery disease    Acute on chronic diastolic heart failure (HCC)    Other constipation    S/P CABG x 3    Diabetic autonomic neuropathy associated with type 2 diabetes mellitus (HCC)    Hyponatremia         Coronary artery disease  S/P coronary artery bypass grafting; POD # 7     Plan:     1  Cardiac:   NSR; HR/BP well-controlled on midodrine  Beta blocker held due to hypotension  Continue ASA and Statin therapy  Epicardial pacing wires out  Patient no longer has central IV access   Continue DVT prophylaxis     2  Pulmonary:   Good Room air oxygen saturation: Continue incentive spirometry/Coughing/Deep breathing exercises  Chest tubes have been discontinued     3  Renal:   Intake/Output net: -1040 mL/24 hours  Continue diuresis   Lasix 20 mg IV BID  Potassium Chloride 10 mEq PO BID  Post op Creatinine stable; Follow up labs prn     4  Neuro:  Neurologically intact; No active issues  Incisional pain well-controlled; Continue prn Percocet     5  GI:  Tolerating TLC 2 3 gm sodium diet  Maintain 1800 mL daily fluid restriction   Continue stool softeners and prn suppository  Continue GI prophylaxis     6  Endo:         History of diabetes; Continue SQ insulin therapy as directed by endocrinology physician  Insulin administration teaching for home therapy ordered     7  Hematology:   Post-operative acute blood loss anemia;  Hemoglobin and hematocrit stable; trend prn        VTE Pharmacologic Prophylaxis: Fondaparinux (Arixtra)  VTE Mechanical Prophylaxis: sequential compression device    Discharge Plan: TBD

## 2018-04-05 LAB
GLUCOSE SERPL-MCNC: 128 MG/DL (ref 65–140)
GLUCOSE SERPL-MCNC: 216 MG/DL (ref 65–140)
GLUCOSE SERPL-MCNC: 220 MG/DL (ref 65–140)

## 2018-04-06 NOTE — TELEPHONE ENCOUNTER
Spoke with PT and he stated that he is still at [facility] and would not like to schedule follow up at this time  He stated he will call at a later time if he decides to schedule

## 2018-05-08 PROBLEM — Z48.89 ENCOUNTER FOR POSTOPERATIVE CARE: Status: ACTIVE | Noted: 2018-05-08

## 2018-05-09 ENCOUNTER — OFFICE VISIT (OUTPATIENT)
Dept: VASCULAR SURGERY | Facility: CLINIC | Age: 58
End: 2018-05-09
Payer: COMMERCIAL

## 2018-05-09 VITALS
HEIGHT: 73 IN | TEMPERATURE: 97.2 F | RESPIRATION RATE: 18 BRPM | DIASTOLIC BLOOD PRESSURE: 72 MMHG | SYSTOLIC BLOOD PRESSURE: 120 MMHG | HEART RATE: 80 BPM

## 2018-05-09 DIAGNOSIS — I25.10 TRIPLE VESSEL CORONARY ARTERY DISEASE: ICD-10-CM

## 2018-05-09 DIAGNOSIS — E11.40 TYPE 2 DIABETES MELLITUS WITH DIABETIC NEUROPATHY, WITH LONG-TERM CURRENT USE OF INSULIN (HCC): ICD-10-CM

## 2018-05-09 DIAGNOSIS — E11.621 DIABETIC ULCER OF LEFT HEEL ASSOCIATED WITH TYPE 2 DIABETES MELLITUS, LIMITED TO BREAKDOWN OF SKIN (HCC): ICD-10-CM

## 2018-05-09 DIAGNOSIS — Z79.4 TYPE 2 DIABETES MELLITUS WITH DIABETIC NEUROPATHY, WITH LONG-TERM CURRENT USE OF INSULIN (HCC): ICD-10-CM

## 2018-05-09 DIAGNOSIS — L97.421 DIABETIC ULCER OF LEFT HEEL ASSOCIATED WITH TYPE 2 DIABETES MELLITUS, LIMITED TO BREAKDOWN OF SKIN (HCC): ICD-10-CM

## 2018-05-09 DIAGNOSIS — I73.9 PAD (PERIPHERAL ARTERY DISEASE) (HCC): Primary | ICD-10-CM

## 2018-05-09 PROCEDURE — 99214 OFFICE O/P EST MOD 30 MIN: CPT | Performed by: SURGERY

## 2018-05-09 NOTE — PATIENT INSTRUCTIONS
As we discussed today in the office I think it is reasonable to proceed with repeat arteriogram and attempt at endovascular intervention  If unsuccessful will proceed with a bypass on a separate setting  If they have any questions and/or concerns please call our office to notify us  If wound deteriorates please call our office immediately  Peripheral Artery Disease   AMBULATORY CARE:   Peripheral artery disease (PAD)  is narrow, weak, or blocked arteries  It may affect any arteries outside of your heart and brain  PAD is usually the result of a buildup of fat and cholesterol, also called plaque, along your artery walls  Inflammation, a blood clot, or abnormal cell growth could also block your arteries  PAD prevents normal blood flow to your legs and arms  You are at risk of an amputation if poor blood flow keeps wounds from healing or causes gangrene (tissue death)  Without treatment, PAD can also cause a heart attack or stroke  Common symptoms include:  Mild PAD usually does not cause symptoms   As the disease worsens over time, you may have the following:  · Pain or cramps in your leg or hip while you walk     · A numb, weak, or heavy feeling in your legs     · Dry, scaly, red, or pale skin on your legs     · Thick or brittle nails, or hair loss on your arms and legs     · Foot sores that will not heal     · Burning or aching in your feet and toes while resting (this may be worse when you lie down)  Call 911 for the following:   · You have any of the following signs of a heart attack:      ¨ Squeezing, pressure, or pain in your chest that lasts longer than 5 minutes or returns    ¨ Discomfort or pain in your back, neck, jaw, stomach, or arm     ¨ Trouble breathing    ¨ Nausea or vomiting    ¨ Lightheadedness or a sudden cold sweat, especially with chest pain or trouble breathing    · You have any of the following signs of a stroke:      ¨ Numbness or drooping on one side of your face     ¨ Weakness in an arm or leg    ¨ Confusion or difficulty speaking    ¨ Dizziness, a severe headache, or vision loss  Seek care immediately if:   · You have sores or wounds that will not heal      · You notice black or discolored skin on your arm or leg  · Your skin is cool to the touch  Contact your healthcare provider if:   · You have leg pain when you walk 1/8 mile (200 meters) or less, even with treatment  · Your legs are red, dry, or pale, even with treatment  · You have questions or concerns about your condition or care  Treatment for PAD  can help reduce your risk of a heart attack, stroke, or amputation  You may need more than one of the following:  · Medicines  may be given to prevent blood clots and reduce the risk of a heart attack or stroke  You may be given medicine to help prevent your PAD from getting worse  · A supervised exercise program  helps you stay active in normal daily activities and may prevent disability  Healthcare providers will help you safely walk or do strength training exercises 3 times a week for 30 to 60 minutes  You will do this for several months, then transition to walking on your own  · Angioplasty  is a procedure to open your artery so blood can flow through normally  A thin tube called a catheter is used to insert a small balloon into your artery  The balloon is inflated to open your blocked artery, and then removed  A tube called a stent may be placed in your artery to hold it open  · Bypass surgery  is used to make a new connection to your artery with a vein from another part of your body, or an artificial graft  The vein or graft is attached to your artery above and below your blockage  This allows blood to flow around the blocked portion of your artery  Manage and prevent PAD:   · Walk for 30 to 60 minutes at least 4 times a week    Your healthcare provider may also refer you to an supervised exercise program  The program helps increase how far you can walk without pain  It also helps you stay active in normal daily activities and may prevent disability caused by PAD  · Do not smoke  Nicotine and other chemicals in cigarettes and cigars can worsen PAD  They can also increase your risk for a heart attack or stroke  Ask your healthcare provider for information if you currently smoke and need help to quit  E-cigarettes or smokeless tobacco still contain nicotine  Talk to your healthcare provider before you use these products  · Manage other health conditions  Take your medicines as directed and follow your healthcare provider's instructions if you have high blood pressure or high cholesterol  Perform foot care and check your blood sugar levels as directed if you have diabetes  · Eat heart healthy foods  Eat whole grains, fruits, and vegetables every day  Limit salt and high-fat foods  Ask your healthcare provider for more information on a heart healthy diet  Ask if you need to lose weight  Your healthcare provider can help you create a healthy weight-loss plan  Follow up with your healthcare provider as directed:  Write down your questions so you remember to ask them during your visits  © 2017 [facility] Information is for End User's use only and may not be sold, redistributed or otherwise used for commercial purposes  All illustrations and images included in CareNotes® are the copyrighted property of [employer]  or [employer]  The above information is an  only  It is not intended as medical advice for individual conditions or treatments  Talk to your doctor, nurse or pharmacist before following any medical regimen to see if it is safe and effective for you

## 2018-05-09 NOTE — PROGRESS NOTES
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 2 diabetes mellitus, limited to breakdown of skin ([facility] )  Status post unsuccessful attempt at recanalizing chronic left popliteal artery occlusion on 03/19  He was subsequently being worked up for a left lower extremity open revascularization and was noted to have significant cardiac disease  He is now status post CABG x3  He was recently discharged from [facility] rehab  He has a large wound on the plantar surface of his calcaneus  He has significant edema of the dorsum of his foot as well as lower leg  He has brawny thickened dry flaky  Skin  We discussed treatment options to include repeat arteriogram versus proceeding directly to bypass  At this time he is agreeable to proceed with repeat arteriogram and attempt at recanalizing the  Left popliteal artery occlusion  If unsuccessful we will proceed with left SFA to below-knee pop bypass on a separate setting  Assessment/Plan   Diagnoses and all orders for this visit:    PAD (peripheral artery disease) ([facility] )    Type 2 diabetes mellitus with diabetic neuropathy, with long-term current use of insulin ([facility])    Triple vessel coronary artery disease    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)  -     Case request operating room: ARTERIOGRAM  Left lower extremity arteriogram with possible popliteal intervention  Please schedule at [place] in next 1-2 weeks ; Standing  -     Basic metabolic panel; Future  -     CBC and Platelet; Future  -     Case request operating room: ARTERIOGRAM  Left lower extremity arteriogram with possible popliteal intervention  Please schedule at [place] in next 1-2 weeks      Other orders  -     Diet NPO; Sips with meds; Standing  -     Void on call to OR; Standing  -     Insert peripheral IV; Standing  -     Place sequential compression device; Standing        Chief Complaint   Patient presents with    Follow-up     f/u sla discuss poss bypass  Pt has a left wound heel  Subjective   Patient ID: Tri Valera is a 61 y o  male  HPI    The following portions of the patient's history were reviewed and updated as appropriate: allergies, current medications, past family history, past medical history, past social history, past surgical history and problem list     Review of Systems   Constitutional: Positive for chills  HENT: Negative  Eyes: Negative  Respiratory: Negative  Cardiovascular: Positive for leg swelling  Gastrointestinal: Negative  Endocrine: Negative  Genitourinary: Negative  Musculoskeletal: Negative  Skin: Positive for wound  Allergic/Immunologic: Negative  Neurological: Positive for weakness  Hematological: Negative  Psychiatric/Behavioral: Negative  I have personally reviewed the ROS entered by MA and agree as documented        Patient Active Problem List   Diagnosis    Anxiety and depression    Benign essential HTN    Diabetic neuropathy, type II diabetes mellitus [facility]    Hyperlipidemia    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 (HCC)    Vitamin D deficiency    [facility]per[facility] artery [facility]) [facility])    Non-healing wound of left heel    Diabetic gastroparesis (HCC)    Bilateral lower extremity edema    Class 2 obesity due to excess calories with serious comorbidity and body mass index (BMI) of 35 0 to 35 9 in adult    Triple vessel coronary artery disease    Acute on chronic diastolic heart failure (HCC)    Other constipation    S/P CABG x 3    Diabetic autonomic neuropathy associated with type 2 diabetes mellitus ([facility] )    Hyponatremia    Encounter for postoperative care       Past Surgical History: Procedure Laterality Date    [place] CABG, ARTERY-VEIN, FOUR N/A 3/28/2018    Procedure: CORONARY ARTERY BYPASS GRAFT (CABG) x3 VESSELS, LIMA TO LAD, SVG--> PDA, SVG--> OM2,  RIGHT LEG EVH/SVH TO , INTRA-OP AMANDEEP;  Surgeon: Maisha Saavedra MD;  [facility];  Service: Cardiac Surgery    ROTATOR CUFF REPAIR Bilateral        Family History   Problem Relation Age of Onset    Atrial fibrillation Mother     Stroke Mother     Hypertension Father     Heart attack Paternal Grandfather 61       Social History     Social History    Marital status: /Civil Union     Spouse name: N/A    Number of children: N/A    Years of education: N/A     Occupational History    Not on file       Social History Main Topics    Smoking status: Former Smoker     Packs/day: 1 00     Years: 12 00     Types: Cigarettes     Quit date: 3/23/1994    Smokeless tobacco: Never Used    Alcohol use No    Drug use: No    Sexual activity: Not on file     Other Topics Concern    Not on file     Social History Narrative    No narrative on file       Allergies   Allergen Reactions    Metformin          Current Outpatient Prescriptions:     acetaminophen (TYLENOL) 325 mg tablet, Take 2 tablets (650 mg total) by mouth every 6 (six) hours as needed for mild pain, moderate pain or fever (temperature greater than 101 F), Disp: 30 tablet, Rfl: 2    aspirin 325 mg tablet, Take 1 tablet (325 mg total) by mouth daily, Disp: 30 tablet, Rfl: 2    atorvastatin (LIPITOR) 80 mg tablet, Take 1 tablet (80 mg total) by mouth daily with dinner, Disp: 30 tablet, Rfl: 2    collagenase (SANTYL) ointment, Apply topically daily, Disp: 15 g, Rfl: 0    docusate sodium (COLACE) 100 mg capsule, Take 1 capsule (100 mg total) by mouth 2 (two) times a day, Disp: 10 capsule, Rfl: 0    Empagliflozin (JARDIANCE) 25 MG TABS, Take 1 tablet (25 mg total) by mouth every morning, Disp: , Rfl: 0    insulin glargine (LANTUS) 100 units/mL subcutaneous injection, Inject 12 Units under the skin daily at bedtime, Disp: 5 mL, Rfl: 2    insulin lispro (HumaLOG) 100 units/mL injection, Inject 10 Units under the skin 3 (three) times a day with meals, Disp: , Rfl: 0    midodrine (PROAMATINE) 10 MG tablet, Take 1 tablet (10 mg total) by mouth 3 (three) times a day with meals, Disp: 90 tablet, Rfl: 2    polyethylene glycol (MIRALAX) 17 g packet, Take 17 g by mouth daily, Disp: 14 each, Rfl: 0    potassium chloride (K-DUR,KLOR-CON) 10 mEq tablet, Take 1 tablet (10 mEq total) by mouth daily, Disp: , Rfl: 0    tamsulosin (FLOMAX) 0 4 mg, Take 1 capsule (0 4 mg total) by mouth daily with dinner, Disp: 30 capsule, Rfl: 2    torsemide (DEMADEX) 10 mg tablet, Take 1 tablet (10 mg total) by mouth daily, Disp: 30 tablet, Rfl: 0    LORazepam (ATIVAN) 0 5 mg tablet, Take 1 tablet (0 5 mg total) by mouth every 8 (eight) hours as needed for anxiety for up to 10 days, Disp: 30 tablet, Rfl: 0    omeprazole (PriLOSEC) 20 mg delayed release capsule, Take 1 capsule (20 mg total) by mouth daily for 28 days, Disp: 30 capsule, Rfl: 0    Imaging:  I have reviewed the imaging and the findings are:   Left lower extremity arteriogram images reviewed focal chronic left mid popliteal occlusion with three-vessel runoff  Posterior tibial artery appears atretic however patent  Dominant runoff via the anterior tibial artery    Objective     Physical Exam:    General appearance: alert and oriented, in no acute distress  Skin: brawny woody edema lower leg with dry flaky skin   Neurologic:   Head: Normocephalic, without obvious abnormality, atraumatic  Lungs: clear to auscultation bilaterally  Chest wall: no tenderness  Heart: S1, S2 normal  Abdomen: soft, non-tender; bowel sounds normal; no masses,  no organomegaly  Extremities: edema bilateral lower legs    Pulse exam:  Radial: Right: 2+ Left[de-identified] 2+  Femoral: difficult to assess secondary to body habitus and being in a wheelchair  DP: Right: non-palpable Left: non-palpable  PT: Right: non-palpable Left: non-palpable

## 2018-05-09 NOTE — ASSESSMENT & PLAN NOTE
Status post unsuccessful attempt at recanalizing chronic left popliteal artery occlusion on 03/19  He was subsequently being worked up for a left lower extremity open revascularization and was noted to have significant cardiac disease  He is now status post CABG  He was recently discharged from [facility]  He has a large wound on the plantar surface of his calcaneus  He has significant edema of the dorsum of his foot as well as lower leg  He has brawny thickened dry flaky  Skin  We discussed treatment options to include repeat arteriogram versus proceeding directly to bypass  At this time he is agreeable to proceed with repeat arteriogram and attempt at recanalizing the  Left popliteal artery occlusion  If unsuccessful we will proceed with left SFA to below-knee pop bypass on a separate setting

## 2018-05-09 NOTE — H&P
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 2 diabetes mellitus, limited to breakdown of skin [facility]  Status post unsuccessful attempt at recanalizing chronic left popliteal artery occlusion on 03/19  He was subsequently being worked up for a left lower extremity open revascularization and was noted to have significant cardiac disease  He is now status post CABG x3  He was [facility] rehab  He has a large wound on the plantar surface of his calcaneus  He has significant edema of the dorsum of his foot as well as lower leg  He has brawny thickened dry flaky  Skin  We discussed treatment options to include repeat arteriogram versus proceeding directly to bypass  At this time he is agreeable to proceed with repeat arteriogram and attempt at recanalizing the  Left popliteal artery occlusion  If unsuccessful we will proceed with left SFA to below-knee pop bypass on a separate setting  Assessment/Plan   Diagnoses and all orders for this visit:    PAD [facility]peripheral artery disease) [facility]    Type 2 diabetes mellitus with diabetic neuropathy, with long-term current use of insulin ([facility])    Triple vessel coronary artery disease    Diabetic ulcer of left heel associated with type 2 diabetes mellitus, limited to breakdown of skin [facility])  -     Case request operating room: ARTERIOGRAM  Left lower extremity arteriogram with possible popliteal intervention  Please schedule at [place] in next 1-2 weeks ; Standing  -     Basic metabolic panel; Future  -     CBC and Platelet; Future  -     Case request operating room: ARTERIOGRAM  Left lower extremity arteriogram with possible popliteal intervention  Please schedule at [place] in next 1-2 weeks      Other orders  -     Diet NPO; Sips with meds; Standing  -     Void on call to OR; Standing  -     Insert peripheral IV; Standing  -     Place sequential compression device; Standing        Chief Complaint   Patient presents with    Follow-up     f/u sla discuss poss bypass  Pt has a left wound heel  Subjective   Patient ID: Manny Tran is a 61 y o  male  HPI    The following portions of the patient's history were reviewed and updated as appropriate: allergies, current medications, past family history, past medical history, past social history, past surgical history and problem list     Review of Systems   Constitutional: Positive for chills  HENT: Negative  Eyes: Negative  Respiratory: Negative  Cardiovascular: Positive for leg swelling  Gastrointestinal: Negative  Endocrine: Negative  Genitourinary: Negative  Musculoskeletal: Negative  Skin: Positive for wound  Allergic/Immunologic: Negative  Neurological: Positive for weakness  Hematological: Negative  Psychiatric/Behavioral: Negative  I have personally reviewed the ROS entered by MA and agree as documented        Patient Active Problem List   Diagnosis    Anxiety and depression    Benign essential HTN    Diabetic neuropathy, type II diabetes mellitus [facility]    Hyperlipidemia    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 (HCC)    Vitamin D deficiency    PAD (peripheral artery disease) ([facility]    Non-healing wound of left heel    Diabetic gastroparesis (HCC)    Bilateral lower extremity edema    Class 2 obesity due to excess calories with serious comorbidity and body mass index (BMI) of 35 0 to 35 9 in adult    Triple vessel coronary artery disease    Acute on chronic diastolic heart failure (HCC)    Other constipation    S/P CABG x 3    Diabetic autonomic neuropathy associated with type 2 diabetes mellitus ([address] )    Hyponatremia    Encounter for postoperative care       Past Surgical History: Procedure Laterality Date    [place] CABG, ARTERY-VEIN, FOUR N/A 3/28/2018    Procedure: CORONARY ARTERY BYPASS GRAFT (CABG) x3 VESSELS, LIMA TO LAD, SVG--> PDA, SVG--> OM2,  RIGHT LEG EVH/SVH TO , INTRA-OP AMANDEEP;  Surgeon: Dodie Mirza MD;  [facility];  Service: Cardiac Surgery    ROTATOR CUFF REPAIR Bilateral        Family History   Problem Relation Age of Onset    Atrial fibrillation Mother     Stroke Mother     Hypertension Father     Heart attack Paternal Grandfather 61       Social History     Social History    Marital status: /Civil Union     Spouse name: N/A    Number of children: N/A    Years of education: N/A     Occupational History    Not on file       Social History Main Topics    Smoking status: Former Smoker     Packs/day: 1 00     Years: 12 00     Types: Cigarettes     Quit date: 3/23/1994    Smokeless tobacco: Never Used    Alcohol use No    Drug use: No    Sexual activity: Not on file     Other Topics Concern    Not on file     Social History Narrative    No narrative on file       Allergies   Allergen Reactions    Metformin          Current Outpatient Prescriptions:     acetaminophen (TYLENOL) 325 mg tablet, Take 2 tablets (650 mg total) by mouth every 6 (six) hours as needed for mild pain, moderate pain or fever (temperature greater than 101 F), Disp: 30 tablet, Rfl: 2    aspirin 325 mg tablet, Take 1 tablet (325 mg total) by mouth daily, Disp: 30 tablet, Rfl: 2    atorvastatin (LIPITOR) 80 mg tablet, Take 1 tablet (80 mg total) by mouth daily with dinner, Disp: 30 tablet, Rfl: 2    collagenase (SANTYL) ointment, Apply topically daily, Disp: 15 g, Rfl: 0    docusate sodium (COLACE) 100 mg capsule, Take 1 capsule (100 mg total) by mouth 2 (two) times a day, Disp: 10 capsule, Rfl: 0    Empagliflozin (JARDIANCE) 25 MG TABS, Take 1 tablet (25 mg total) by mouth every morning, Disp: , Rfl: 0    insulin glargine (LANTUS) 100 units/mL subcutaneous injection, Inject 12 Units under the skin daily at bedtime, Disp: 5 mL, Rfl: 2    insulin lispro (HumaLOG) 100 units/mL injection, Inject 10 Units under the skin 3 (three) times a day with meals, Disp: , Rfl: 0    midodrine (PROAMATINE) 10 MG tablet, Take 1 tablet (10 mg total) by mouth 3 (three) times a day with meals, Disp: 90 tablet, Rfl: 2    polyethylene glycol (MIRALAX) 17 g packet, Take 17 g by mouth daily, Disp: 14 each, Rfl: 0    potassium chloride (K-DUR,KLOR-CON) 10 mEq tablet, Take 1 tablet (10 mEq total) by mouth daily, Disp: , Rfl: 0    tamsulosin (FLOMAX) 0 4 mg, Take 1 capsule (0 4 mg total) by mouth daily with dinner, Disp: 30 capsule, Rfl: 2    torsemide (DEMADEX) 10 mg tablet, Take 1 tablet (10 mg total) by mouth daily, Disp: 30 tablet, Rfl: 0    LORazepam (ATIVAN) 0 5 mg tablet, Take 1 tablet (0 5 mg total) by mouth every 8 (eight) hours as needed for anxiety for up to 10 days, Disp: 30 tablet, Rfl: 0    omeprazole (PriLOSEC) 20 mg delayed release capsule, Take 1 capsule (20 mg total) by mouth daily for 28 days, Disp: 30 capsule, Rfl: 0    Imaging:  I have reviewed the imaging and the findings are:   Left lower extremity arteriogram images reviewed focal chronic left mid popliteal occlusion with three-vessel runoff  Posterior tibial artery appears atretic however patent  Dominant runoff via the anterior tibial artery    Objective     Physical Exam:    General appearance: alert and oriented, in no acute distress  Skin: brawny woody edema lower leg with dry flaky skin   Neurologic:   Head: Normocephalic, without obvious abnormality, atraumatic  Lungs: clear to auscultation bilaterally  Chest wall: no tenderness  Heart: S1, S2 normal  Abdomen: soft, non-tender; bowel sounds normal; no masses,  no organomegaly  Extremities: edema bilateral lower legs    Pulse exam:  Radial: Right: 2+ Left[de-identified] 2+  Femoral: difficult to assess secondary to body habitus and being in a wheelchair  DP: Right: non-palpable Left: non-palpable  PT: Right: non-palpable Left: non-palpable

## 2018-05-10 ENCOUNTER — OFFICE VISIT (OUTPATIENT)
Dept: CARDIAC SURGERY | Facility: CLINIC | Age: 58
End: 2018-05-10

## 2018-05-10 VITALS
WEIGHT: 260 LBS | HEIGHT: 73 IN | SYSTOLIC BLOOD PRESSURE: 142 MMHG | RESPIRATION RATE: 14 BRPM | TEMPERATURE: 97.1 F | BODY MASS INDEX: 34.46 KG/M2 | HEART RATE: 80 BPM | DIASTOLIC BLOOD PRESSURE: 76 MMHG | OXYGEN SATURATION: 98 %

## 2018-05-10 DIAGNOSIS — I73.9 PERIPHERAL VASCULAR DISEASE, UNSPECIFIED (HCC): Primary | ICD-10-CM

## 2018-05-10 DIAGNOSIS — I25.10 TRIPLE VESSEL CORONARY ARTERY DISEASE: Primary | ICD-10-CM

## 2018-05-10 DIAGNOSIS — Z95.1 S/P CABG X 3: ICD-10-CM

## 2018-05-10 DIAGNOSIS — Z48.89 ENCOUNTER FOR POSTOPERATIVE CARE: ICD-10-CM

## 2018-05-10 PROBLEM — L97.421 DIABETIC ULCER OF LEFT HEEL ASSOCIATED WITH TYPE 2 DIABETES MELLITUS, LIMITED TO BREAKDOWN OF SKIN (HCC): Status: ACTIVE | Noted: 2018-05-10

## 2018-05-10 PROBLEM — E11.621 DIABETIC ULCER OF LEFT HEEL ASSOCIATED WITH TYPE 2 DIABETES MELLITUS, LIMITED TO BREAKDOWN OF SKIN (HCC): Status: ACTIVE | Noted: 2018-05-10

## 2018-05-10 PROCEDURE — 99024 POSTOP FOLLOW-UP VISIT: CPT | Performed by: NURSE PRACTITIONER

## 2018-05-10 NOTE — LETTER
May 10, 2018     Ayleen Bright, [address] [id number]    Patient: Elias Sandoval   YOB: 1960   Date of Visit: 5/10/2018       Dear Dr Schneider Brood:    Thank you for referring Elias Sandoval to me for evaluation  Below are my notes for this consultation  If you have questions, please do not hesitate to call me  I look forward to following your patient along with you  Sincerely,        Nicanor Myers MD        CC: No Recipients  CARLTON Cheek  5/10/2018 10:28 AM  Attested  Procedure: S/P coronary artery bypass grafting x 3 [LIMA to LAD,  to PDA, SVG to OM2], performed on 3/28/18    History: Elias Sandoval is a 61y o  year old male who presents to our office today for routine follow up care from coronary artery bypass grafting  He was being evaluated for left foot gangrenous ulcer and planned vascular surgery  Cardiac w/u revealed coronary artery disease and he therefore underwent CABG x 3 on 3/28/18  Post op he experienced urinary retention requiring jacobs catheter reinsertion  He was discharged to [place] [id number] for ongoing skilled nursing care and PT/OT  He has since returned home  He has had follow up with vascular surgery and was scheduled for LE revascularization surgery which was cancelled  He is schedule for LE arteriogram/angioplasty procedure later this month  He is doing well from a cardiac surgery standpoint  He is Wheelchair bound due to non-weight bearing status due to is left foot ulcer  He denies SOB, angina  Lightheadedness, fever, chills or fluid overload  Vital Signs:   Vitals:    05/10/18 0900 05/10/18 0958   BP: 146/82 142/76   BP Location: Left arm Right arm   Cuff Size: Adult    Pulse: 80    Resp: 14    Temp: (!) 97 1 °F (36 2 °C)    TempSrc: Oral    SpO2: 98%    Weight: 118 kg (260 lb)    Height: 6' 1" (1 854 m)        Home Medications:   Prior to Admission medications    Medication Sig Start Date End Date Taking?  Authorizing Provider acetaminophen (TYLENOL) 325 mg tablet Take 2 tablets (650 mg total) by mouth every 6 (six) hours as needed for mild pain, moderate pain or fever (temperature greater than 101 F) 4/4/18  Yes Shay Davis PA-C   aspirin 325 mg tablet Take 1 tablet (325 mg total) by mouth daily 4/5/18  Yes Shay Davis PA-C   atorvastatin (LIPITOR) 80 mg tablet Take 1 tablet (80 mg total) by mouth daily with dinner 4/4/18  Yes Shay Davis PA-C   collagenase (SANTYL) ointment Apply topically daily 4/5/18  Yes Shay Davis PA-C   docusate sodium (COLACE) 100 mg capsule Take 1 capsule (100 mg total) by mouth 2 (two) times a day 4/4/18  Yes Shay Davis PA-C   insulin glargine (LANTUS) 100 units/mL subcutaneous injection Inject 12 Units under the skin daily at bedtime 4/4/18  Yes Shay Davis PA-C   Empagliflozin (JARDIANCE) 25 MG TABS Take 1 tablet (25 mg total) by mouth every morning 4/4/18   Shay Davis PA-C   LORazepam (ATIVAN) 0 5 mg tablet Take 1 tablet (0 5 mg total) by mouth every 8 (eight) hours as needed for anxiety for up to 10 days 4/4/18 4/14/18  Shay Davis PA-C   midodrine (PROAMATINE) 10 MG tablet Take 1 tablet (10 mg total) by mouth 3 (three) times a day with meals 4/4/18   Shay Davis PA-C   omeprazole (PriLOSEC) 20 mg delayed release capsule Take 1 capsule (20 mg total) by mouth daily for 28 days 4/4/18 5/2/18  Shay Davis PA-C   polyethylene glycol (MIRALAX) 17 g packet Take 17 g by mouth daily 4/5/18   Shay Davis PA-C   potassium chloride (K-DUR,KLOR-CON) 10 mEq tablet Take 1 tablet (10 mEq total) by mouth daily 4/4/18   Ronna Davis PA-C   tamsulosin (FLOMAX) 0 4 mg Take 1 capsule (0 4 mg total) by mouth daily with dinner 4/4/18   Shay Davis PA-C   torsemide (DEMADEX) 10 mg tablet Take 1 tablet (10 mg total) by mouth daily 2/21/18   Trevon Montero DO   insulin lispro (HumaLOG) 100 units/mL injection Inject 10 Units under the skin 3 (three) times a day with meals 4/4/18 5/10/18  Shayna Sage PA-C       Physical Exam:  General: well developed, no acute distress  HEENT/NECK:  PERRLA  No jugular venous distention  Cardiac:Regular rate and rhythm, No murmurs rubs or gallops  Pulmonary:Breath sounds clear bilaterally  Abdomen:  Non-tender, Non-distended  Positive bowel sounds  Lower extremities: Extremities warm/dry  PT/DP pulses 0 bilaterally  2+ edema B/L  Incisions: Sternum is stable  Incision is clean, dry, and intact  Saphenectomy incision is clean, dry, and intact  Neuro: Alert and oriented X 3  Sensation is grossly intact  No focal deficits  Skin: Warm/Dry, without rashes or lesions  Lab Results:                 Lab Results   Component Value Date    HGBA1C 9 4 (H) 03/14/2018     Lab Results   Component Value Date    TROPONINI 0 43 (H) 03/14/2018           Assessment: Coronary artery disease  S/P coronary artery bypass grafting;    Plan:     Ramin Estrella continues to make good progress in their recovery following coronary artery bypass grafting  They are now 5 weeks post op  Incisions are well-healed and their sternum is stable  Weight and VS are stable  I discussed the benefits of participating in cardiac rehab but he will need to wait until he is weight bearing to proceed  I reviewed their lifting restriction of no more than 25 lbs for an additional 2 months, until they reach 12 weeks post-op  Ramin Estrella has already been evaluated by their primary care physician cardiologist for ongoing medical care  At this point I have not scheduled them for routine followup care with our office  I have advised them to call with any new concerns that may arise  Ramin Estrella was comfortable with our recommendations and their questions were answered to their satisfaction  CARLTON Del Angel  5/10/18  Attestation signed by Jose Bell MD at 5/10/2018 10:36 AM:  Patient seen and evaluated with Spring Hutson PA-C    I agree with the above assessment and plan with the following additions

## 2018-05-10 NOTE — PROGRESS NOTES
Procedure: S/P coronary artery bypass grafting x 3 [LIMA to LAD,  to PDA, SVG to OM2], performed on 3/28/18    History: Radha Maldonado is a 61y o  year old male who presents to our office today for routine follow up care from coronary artery bypass grafting  He was being evaluated for left foot gangrenous ulcer and planned vascular surgery  Cardiac w/u revealed coronary artery disease and he therefore underwent CABG x 3 on 3/28/18  Post op he experienced urinary retention requiring jacobs catheter reinsertion  He was discharged to [facility] for ongoing skilled nursing care and PT/OT  He has since returned home  He has had follow up with vascular surgery and was scheduled for LE revascularization surgery which was cancelled  He is schedule for LE arteriogram/angioplasty procedure later this month  He is doing well from a cardiac surgery standpoint  He is Wheelchair bound due to non-weight bearing status due to is left foot ulcer  He denies SOB, angina  Lightheadedness, fever, chills or fluid overload  Vital Signs:   Vitals:    05/10/18 0900 05/10/18 0958   BP: 146/82 142/76   BP Location: Left arm Right arm   Cuff Size: Adult    Pulse: 80    Resp: 14    Temp: (!) 97 1 °F (36 2 °C)    TempSrc: Oral    SpO2: 98%    Weight: 118 kg (260 lb)    Height: 6' 1" (1 854 m)        Home Medications:   Prior to Admission medications    Medication Sig Start Date End Date Taking?  Authorizing Provider   acetaminophen (TYLENOL) 325 mg tablet Take 2 tablets (650 mg total) by mouth every 6 (six) hours as needed for mild pain, moderate pain or fever (temperature greater than 101 F) 18  Yes Shay Davis PA-C   aspirin 325 mg tablet Take 1 tablet (325 mg total) by mouth daily 18  Yes Shay Davis PA-C   atorvastatin (LIPITOR) 80 mg tablet Take 1 tablet (80 mg total) by mouth daily with dinner 18  Yes Shay Davis PA-C   collagenase (SANTYL) ointment Apply topically daily 18  Yes Shay LEWIS NUNO Davis   docusate sodium (COLACE) 100 mg capsule Take 1 capsule (100 mg total) by mouth 2 (two) times a day 4/4/18  Yes Shay Davis PA-C   insulin glargine (LANTUS) 100 units/mL subcutaneous injection Inject 12 Units under the skin daily at bedtime 4/4/18  Yes Shay Davis PA-C   Empagliflozin (JARDIANCE) 25 MG TABS Take 1 tablet (25 mg total) by mouth every morning 4/4/18   Shay Davis PA-C   LORazepam (ATIVAN) 0 5 mg tablet Take 1 tablet (0 5 mg total) by mouth every 8 (eight) hours as needed for anxiety for up to 10 days 4/4/18 4/14/18  Shay Davis PA-C   midodrine (PROAMATINE) 10 MG tablet Take 1 tablet (10 mg total) by mouth 3 (three) times a day with meals 4/4/18   Shay Davis PA-C   omeprazole (PriLOSEC) 20 mg delayed release capsule Take 1 capsule (20 mg total) by mouth daily for 28 days 4/4/18 5/2/18  Shay Davis PA-C   polyethylene glycol (MIRALAX) 17 g packet Take 17 g by mouth daily 4/5/18   Shay Davis PA-C   potassium chloride (K-DUR,KLOR-CON) 10 mEq tablet Take 1 tablet (10 mEq total) by mouth daily 4/4/18   [place] Timothy Davis PA-C   tamsulosin (FLOMAX) 0 4 mg Take 1 capsule (0 4 mg total) by mouth daily with dinner 4/4/18   [place] Timothy Davis PA-C   torsemide (DEMADEX) 10 mg tablet Take 1 tablet (10 mg total) by mouth daily 2/21/18   Kandi Blank DO   insulin lispro (HumaLOG) 100 units/mL injection Inject 10 Units under the skin 3 (three) times a day with meals 4/4/18 5/10/18  Sanrda Bustillo PA-C       Physical Exam:  General: well developed, no acute distress  HEENT/NECK:  PERRLA  No jugular venous distention  Cardiac:Regular rate and rhythm, No murmurs rubs or gallops  Pulmonary:Breath sounds clear bilaterally  Abdomen:  Non-tender, Non-distended  Positive bowel sounds  Lower extremities: Extremities warm/dry  PT/DP pulses 0 bilaterally  2+ edema B/L  Incisions: Sternum is stable  Incision is clean, dry, and intact     Saphenectomy incision is clean, dry, and intact  Neuro: Alert and oriented X 3  Sensation is grossly intact  No focal deficits  Skin: Warm/Dry, without rashes or lesions  Lab Results:                 Lab Results   Component Value Date    HGBA1C 9 4 (H) 03/14/2018     Lab Results   Component Value Date    TROPONINI 0 43 (H) 03/14/2018           Assessment: Coronary artery disease  S/P coronary artery bypass grafting;    Plan:     Shay Duron continues to make good progress in their recovery following coronary artery bypass grafting  They are now 5 weeks post op  Incisions are well-healed and their sternum is stable  Weight and VS are stable  I discussed the benefits of participating in cardiac rehab but he will need to wait until he is weight bearing to proceed  I reviewed their lifting restriction of no more than 25 lbs for an additional 2 months, until they reach 12 weeks post-op  Shay Duron has already been evaluated by their primary care physician cardiologist for ongoing medical care  At this point I have not scheduled them for routine followup care with our office  I have advised them to call with any new concerns that may arise  Shay Duron was comfortable with our recommendations and their questions were answered to their satisfaction      CARLTON Kelley  5/10/18

## 2018-05-11 ENCOUNTER — PATIENT OUTREACH (OUTPATIENT)
Dept: FAMILY MEDICINE CLINIC | Facility: CLINIC | Age: 58
End: 2018-05-11

## 2018-05-11 NOTE — PROGRESS NOTES
Jarred Kane is managing at home post hospitalization and rehab stay  He does not need additional caregiver assistance at home  He is NWB on L foot due to wound to L heel  He is receiving wound care through [facility] several times per week  He is scheduled for  angiogram 5/29  His blood sugars are ranging from 120-150  Nutritional intake adequate  His f/u appointments are scheduled  He is agreeable to further outreach

## 2018-05-22 ENCOUNTER — APPOINTMENT (OUTPATIENT)
Dept: WOUND CARE | Facility: HOSPITAL | Age: 58
End: 2018-05-22
Payer: COMMERCIAL

## 2018-05-22 ENCOUNTER — APPOINTMENT (OUTPATIENT)
Dept: LAB | Facility: HOSPITAL | Age: 58
End: 2018-05-22
Attending: SURGERY
Payer: COMMERCIAL

## 2018-05-22 DIAGNOSIS — L97.421 DIABETIC ULCER OF LEFT HEEL ASSOCIATED WITH TYPE 2 DIABETES MELLITUS, LIMITED TO BREAKDOWN OF SKIN (HCC): ICD-10-CM

## 2018-05-22 DIAGNOSIS — I73.9 PERIPHERAL VASCULAR DISEASE, UNSPECIFIED (HCC): ICD-10-CM

## 2018-05-22 DIAGNOSIS — E11.621 DIABETIC ULCER OF LEFT HEEL ASSOCIATED WITH TYPE 2 DIABETES MELLITUS, LIMITED TO BREAKDOWN OF SKIN (HCC): ICD-10-CM

## 2018-05-22 LAB
ANION GAP SERPL CALCULATED.3IONS-SCNC: 9 MMOL/L (ref 4–13)
BUN SERPL-MCNC: 13 MG/DL (ref 5–25)
CALCIUM SERPL-MCNC: 8.7 MG/DL (ref 8.3–10.1)
CHLORIDE SERPL-SCNC: 101 MMOL/L (ref 100–108)
CO2 SERPL-SCNC: 28 MMOL/L (ref 21–32)
CREAT SERPL-MCNC: 0.81 MG/DL (ref 0.6–1.3)
ERYTHROCYTE [DISTWIDTH] IN BLOOD BY AUTOMATED COUNT: 15 % (ref 11.6–15.1)
GFR SERPL CREATININE-BSD FRML MDRD: 98 ML/MIN/1.73SQ M
GLUCOSE SERPL-MCNC: 160 MG/DL (ref 65–140)
HCT VFR BLD AUTO: 35.5 % (ref 36.5–49.3)
HGB BLD-MCNC: 11.2 G/DL (ref 12–17)
INR PPP: 1.11 (ref 0.86–1.17)
MCH RBC QN AUTO: 26.6 PG (ref 26.8–34.3)
MCHC RBC AUTO-ENTMCNC: 31.5 G/DL (ref 31.4–37.4)
MCV RBC AUTO: 84 FL (ref 82–98)
PLATELET # BLD AUTO: 294 THOUSANDS/UL (ref 149–390)
PMV BLD AUTO: 10.4 FL (ref 8.9–12.7)
POTASSIUM SERPL-SCNC: 3.9 MMOL/L (ref 3.5–5.3)
PROTHROMBIN TIME: 14.4 SECONDS (ref 11.8–14.2)
RBC # BLD AUTO: 4.21 MILLION/UL (ref 3.88–5.62)
SODIUM SERPL-SCNC: 138 MMOL/L (ref 136–145)
WBC # BLD AUTO: 9.4 THOUSAND/UL (ref 4.31–10.16)

## 2018-05-22 PROCEDURE — 80048 BASIC METABOLIC PNL TOTAL CA: CPT

## 2018-05-22 PROCEDURE — 85610 PROTHROMBIN TIME: CPT

## 2018-05-22 PROCEDURE — 99213 OFFICE O/P EST LOW 20 MIN: CPT | Performed by: PODIATRIST

## 2018-05-22 PROCEDURE — 85027 COMPLETE CBC AUTOMATED: CPT

## 2018-05-22 PROCEDURE — 36415 COLL VENOUS BLD VENIPUNCTURE: CPT

## 2018-05-23 ENCOUNTER — HOSPITAL ENCOUNTER (INPATIENT)
Facility: HOSPITAL | Age: 58
LOS: 4 days | Discharge: HOME HEALTH | DRG: 866 | End: 2018-05-27
Attending: EMERGENCY MEDICINE | Admitting: INTERNAL MEDICINE
Payer: COMMERCIAL

## 2018-05-23 ENCOUNTER — APPOINTMENT (EMERGENCY)
Dept: RADIOLOGY | Facility: HOSPITAL | Age: 58
DRG: 866 | End: 2018-05-23
Payer: COMMERCIAL

## 2018-05-23 ENCOUNTER — TELEPHONE (OUTPATIENT)
Dept: RADIOLOGY | Facility: HOSPITAL | Age: 58
End: 2018-05-23

## 2018-05-23 DIAGNOSIS — J18.9 HEALTHCARE-ASSOCIATED PNEUMONIA: Primary | ICD-10-CM

## 2018-05-23 DIAGNOSIS — S91.302A NON-HEALING WOUND OF LEFT HEEL: ICD-10-CM

## 2018-05-23 DIAGNOSIS — E11.621 DIABETIC ULCER OF LEFT HEEL ASSOCIATED WITH TYPE 2 DIABETES MELLITUS, LIMITED TO BREAKDOWN OF SKIN (HCC): ICD-10-CM

## 2018-05-23 DIAGNOSIS — I73.9 PAD (PERIPHERAL ARTERY DISEASE) (HCC): ICD-10-CM

## 2018-05-23 DIAGNOSIS — Z95.1 S/P CABG X 3: ICD-10-CM

## 2018-05-23 DIAGNOSIS — R77.8 ELEVATED TROPONIN: ICD-10-CM

## 2018-05-23 DIAGNOSIS — L97.421 DIABETIC ULCER OF LEFT HEEL ASSOCIATED WITH TYPE 2 DIABETES MELLITUS, LIMITED TO BREAKDOWN OF SKIN (HCC): ICD-10-CM

## 2018-05-23 DIAGNOSIS — I21.4 NSTEMI (NON-ST ELEVATED MYOCARDIAL INFARCTION) (HCC): ICD-10-CM

## 2018-05-23 PROBLEM — I50.9 CHRONIC CHF (HCC): Status: ACTIVE | Noted: 2018-05-23

## 2018-05-23 LAB
ALBUMIN SERPL BCP-MCNC: 2.9 G/DL (ref 3.5–5)
ALP SERPL-CCNC: 60 U/L (ref 46–116)
ALT SERPL W P-5'-P-CCNC: 23 U/L (ref 12–78)
ANION GAP SERPL CALCULATED.3IONS-SCNC: 7 MMOL/L (ref 4–13)
APTT PPP: 30 SECONDS (ref 24–36)
AST SERPL W P-5'-P-CCNC: 29 U/L (ref 5–45)
BACTERIA UR QL AUTO: ABNORMAL /HPF
BASOPHILS # BLD AUTO: 0.02 THOUSANDS/ΜL (ref 0–0.1)
BASOPHILS NFR BLD AUTO: 0 % (ref 0–1)
BILIRUB SERPL-MCNC: 0.39 MG/DL (ref 0.2–1)
BILIRUB UR QL STRIP: NEGATIVE
BUN SERPL-MCNC: 13 MG/DL (ref 5–25)
CALCIUM SERPL-MCNC: 8.8 MG/DL (ref 8.3–10.1)
CHLORIDE SERPL-SCNC: 98 MMOL/L (ref 100–108)
CLARITY UR: CLEAR
CO2 SERPL-SCNC: 29 MMOL/L (ref 21–32)
COLOR UR: YELLOW
CREAT SERPL-MCNC: 0.9 MG/DL (ref 0.6–1.3)
EOSINOPHIL # BLD AUTO: 0.09 THOUSAND/ΜL (ref 0–0.61)
EOSINOPHIL NFR BLD AUTO: 1 % (ref 0–6)
ERYTHROCYTE [DISTWIDTH] IN BLOOD BY AUTOMATED COUNT: 14.9 % (ref 11.6–15.1)
GFR SERPL CREATININE-BSD FRML MDRD: 94 ML/MIN/1.73SQ M
GLUCOSE SERPL-MCNC: 132 MG/DL (ref 65–140)
GLUCOSE SERPL-MCNC: 142 MG/DL (ref 65–140)
GLUCOSE UR STRIP-MCNC: NEGATIVE MG/DL
HCT VFR BLD AUTO: 35.4 % (ref 36.5–49.3)
HGB BLD-MCNC: 11.1 G/DL (ref 12–17)
HGB UR QL STRIP.AUTO: ABNORMAL
INR PPP: 1.13 (ref 0.86–1.17)
KETONES UR STRIP-MCNC: NEGATIVE MG/DL
LACTATE SERPL-SCNC: 1.2 MMOL/L (ref 0.5–2)
LACTATE SERPL-SCNC: 1.8 MMOL/L (ref 0.5–2)
LEUKOCYTE ESTERASE UR QL STRIP: NEGATIVE
LYMPHOCYTES # BLD AUTO: 0.79 THOUSANDS/ΜL (ref 0.6–4.47)
LYMPHOCYTES NFR BLD AUTO: 9 % (ref 14–44)
MCH RBC QN AUTO: 26.1 PG (ref 26.8–34.3)
MCHC RBC AUTO-ENTMCNC: 31.4 G/DL (ref 31.4–37.4)
MCV RBC AUTO: 83 FL (ref 82–98)
MONOCYTES # BLD AUTO: 0.57 THOUSAND/ΜL (ref 0.17–1.22)
MONOCYTES NFR BLD AUTO: 7 % (ref 4–12)
NEUTROPHILS # BLD AUTO: 6.93 THOUSANDS/ΜL (ref 1.85–7.62)
NEUTS SEG NFR BLD AUTO: 83 % (ref 43–75)
NITRITE UR QL STRIP: NEGATIVE
NON-SQ EPI CELLS URNS QL MICRO: ABNORMAL /HPF
NRBC BLD AUTO-RTO: 0 /100 WBCS
PH UR STRIP.AUTO: 7.5 [PH] (ref 4.5–8)
PLATELET # BLD AUTO: 270 THOUSANDS/UL (ref 149–390)
PMV BLD AUTO: 9.5 FL (ref 8.9–12.7)
POTASSIUM SERPL-SCNC: 3.8 MMOL/L (ref 3.5–5.3)
PROT SERPL-MCNC: 8.3 G/DL (ref 6.4–8.2)
PROT UR STRIP-MCNC: ABNORMAL MG/DL
PROTHROMBIN TIME: 14.6 SECONDS (ref 11.8–14.2)
RBC # BLD AUTO: 4.26 MILLION/UL (ref 3.88–5.62)
RBC #/AREA URNS AUTO: ABNORMAL /HPF
SODIUM SERPL-SCNC: 134 MMOL/L (ref 136–145)
SP GR UR STRIP.AUTO: 1.02 (ref 1–1.03)
TROPONIN I SERPL-MCNC: 0.06 NG/ML
TROPONIN I SERPL-MCNC: 0.07 NG/ML
UROBILINOGEN UR QL STRIP.AUTO: 0.2 E.U./DL
WBC # BLD AUTO: 8.4 THOUSAND/UL (ref 4.31–10.16)
WBC #/AREA URNS AUTO: ABNORMAL /HPF

## 2018-05-23 PROCEDURE — 80053 COMPREHEN METABOLIC PANEL: CPT | Performed by: EMERGENCY MEDICINE

## 2018-05-23 PROCEDURE — 36415 COLL VENOUS BLD VENIPUNCTURE: CPT

## 2018-05-23 PROCEDURE — 71046 X-RAY EXAM CHEST 2 VIEWS: CPT

## 2018-05-23 PROCEDURE — 93010 ELECTROCARDIOGRAM REPORT: CPT | Performed by: INTERNAL MEDICINE

## 2018-05-23 PROCEDURE — 93005 ELECTROCARDIOGRAM TRACING: CPT

## 2018-05-23 PROCEDURE — 85730 THROMBOPLASTIN TIME PARTIAL: CPT | Performed by: EMERGENCY MEDICINE

## 2018-05-23 PROCEDURE — 81001 URINALYSIS AUTO W/SCOPE: CPT | Performed by: EMERGENCY MEDICINE

## 2018-05-23 PROCEDURE — 82948 REAGENT STRIP/BLOOD GLUCOSE: CPT

## 2018-05-23 PROCEDURE — 99285 EMERGENCY DEPT VISIT HI MDM: CPT

## 2018-05-23 PROCEDURE — 99223 1ST HOSP IP/OBS HIGH 75: CPT | Performed by: PHYSICIAN ASSISTANT

## 2018-05-23 PROCEDURE — 73630 X-RAY EXAM OF FOOT: CPT

## 2018-05-23 PROCEDURE — 96374 THER/PROPH/DIAG INJ IV PUSH: CPT

## 2018-05-23 PROCEDURE — 84484 ASSAY OF TROPONIN QUANT: CPT | Performed by: PHYSICIAN ASSISTANT

## 2018-05-23 PROCEDURE — 85610 PROTHROMBIN TIME: CPT | Performed by: EMERGENCY MEDICINE

## 2018-05-23 PROCEDURE — 87040 BLOOD CULTURE FOR BACTERIA: CPT | Performed by: EMERGENCY MEDICINE

## 2018-05-23 PROCEDURE — 83605 ASSAY OF LACTIC ACID: CPT | Performed by: EMERGENCY MEDICINE

## 2018-05-23 PROCEDURE — 84484 ASSAY OF TROPONIN QUANT: CPT | Performed by: EMERGENCY MEDICINE

## 2018-05-23 PROCEDURE — 85025 COMPLETE CBC W/AUTO DIFF WBC: CPT | Performed by: EMERGENCY MEDICINE

## 2018-05-23 RX ORDER — FERROUS SULFATE 220 (44)/5
220 ELIXIR ORAL DAILY
COMMUNITY
End: 2018-06-12

## 2018-05-23 RX ORDER — METOCLOPRAMIDE 5 MG/1
5 TABLET ORAL DAILY
COMMUNITY
End: 2018-06-12 | Stop reason: SDUPTHER

## 2018-05-23 RX ORDER — FERROUS SULFATE 325(65) MG
325 TABLET ORAL 2 TIMES DAILY WITH MEALS
COMMUNITY
End: 2018-06-12 | Stop reason: SDUPTHER

## 2018-05-23 RX ORDER — ACETAMINOPHEN 325 MG/1
650 TABLET ORAL EVERY 6 HOURS PRN
Status: DISCONTINUED | OUTPATIENT
Start: 2018-05-23 | End: 2018-05-27 | Stop reason: HOSPADM

## 2018-05-23 RX ORDER — ONDANSETRON 2 MG/ML
4 INJECTION INTRAMUSCULAR; INTRAVENOUS EVERY 6 HOURS PRN
Status: DISCONTINUED | OUTPATIENT
Start: 2018-05-23 | End: 2018-05-27 | Stop reason: HOSPADM

## 2018-05-23 RX ORDER — ATORVASTATIN CALCIUM 80 MG/1
80 TABLET, FILM COATED ORAL
Status: DISCONTINUED | OUTPATIENT
Start: 2018-05-24 | End: 2018-05-27 | Stop reason: HOSPADM

## 2018-05-23 RX ORDER — FUROSEMIDE 20 MG/1
20 TABLET ORAL DAILY
COMMUNITY
End: 2018-06-12 | Stop reason: SDUPTHER

## 2018-05-23 RX ORDER — FERROUS SULFATE 325(65) MG
325 TABLET ORAL 2 TIMES DAILY WITH MEALS
Status: DISCONTINUED | OUTPATIENT
Start: 2018-05-24 | End: 2018-05-27 | Stop reason: HOSPADM

## 2018-05-23 RX ORDER — ACETAMINOPHEN 325 MG/1
650 TABLET ORAL ONCE
Status: COMPLETED | OUTPATIENT
Start: 2018-05-23 | End: 2018-05-23

## 2018-05-23 RX ORDER — ASPIRIN 325 MG
325 TABLET ORAL DAILY
Status: DISCONTINUED | OUTPATIENT
Start: 2018-05-24 | End: 2018-05-27 | Stop reason: HOSPADM

## 2018-05-23 RX ORDER — METOCLOPRAMIDE 10 MG/1
5 TABLET ORAL DAILY
Status: DISCONTINUED | OUTPATIENT
Start: 2018-05-24 | End: 2018-05-27 | Stop reason: HOSPADM

## 2018-05-23 RX ORDER — FLUDROCORTISONE ACETATE 0.1 MG/1
0.1 TABLET ORAL DAILY
Status: DISCONTINUED | OUTPATIENT
Start: 2018-05-24 | End: 2018-05-27 | Stop reason: HOSPADM

## 2018-05-23 RX ORDER — MIDODRINE HYDROCHLORIDE 5 MG/1
10 TABLET ORAL
Status: DISCONTINUED | OUTPATIENT
Start: 2018-05-24 | End: 2018-05-27 | Stop reason: HOSPADM

## 2018-05-23 RX ORDER — PANTOPRAZOLE SODIUM 40 MG/1
40 TABLET, DELAYED RELEASE ORAL DAILY
COMMUNITY
End: 2018-06-12

## 2018-05-23 RX ORDER — POLYETHYLENE GLYCOL 3350 17 G/17G
17 POWDER, FOR SOLUTION ORAL DAILY PRN
Status: DISCONTINUED | OUTPATIENT
Start: 2018-05-23 | End: 2018-05-27 | Stop reason: HOSPADM

## 2018-05-23 RX ORDER — ONDANSETRON 4 MG/1
4 TABLET, ORALLY DISINTEGRATING ORAL EVERY 8 HOURS PRN
COMMUNITY
End: 2018-06-12

## 2018-05-23 RX ORDER — FUROSEMIDE 20 MG/1
20 TABLET ORAL DAILY
Status: DISCONTINUED | OUTPATIENT
Start: 2018-05-24 | End: 2018-05-27 | Stop reason: HOSPADM

## 2018-05-23 RX ORDER — INSULIN GLARGINE 100 [IU]/ML
70 INJECTION, SOLUTION SUBCUTANEOUS EVERY 12 HOURS SCHEDULED
Status: DISCONTINUED | OUTPATIENT
Start: 2018-05-23 | End: 2018-05-27 | Stop reason: HOSPADM

## 2018-05-23 RX ORDER — DOCUSATE SODIUM 100 MG/1
100 CAPSULE, LIQUID FILLED ORAL 2 TIMES DAILY
Status: DISCONTINUED | OUTPATIENT
Start: 2018-05-23 | End: 2018-05-27 | Stop reason: HOSPADM

## 2018-05-23 RX ORDER — TAMSULOSIN HYDROCHLORIDE 0.4 MG/1
0.4 CAPSULE ORAL
Status: DISCONTINUED | OUTPATIENT
Start: 2018-05-24 | End: 2018-05-27 | Stop reason: HOSPADM

## 2018-05-23 RX ORDER — LACTULOSE 10 G/10G
10 SOLUTION ORAL DAILY PRN
COMMUNITY
End: 2018-06-12

## 2018-05-23 RX ORDER — FLUDROCORTISONE ACETATE 0.1 MG/1
0.1 TABLET ORAL DAILY
COMMUNITY
End: 2018-06-12 | Stop reason: SDUPTHER

## 2018-05-23 RX ADMIN — INSULIN GLARGINE 70 UNITS: 100 INJECTION, SOLUTION SUBCUTANEOUS at 22:05

## 2018-05-23 RX ADMIN — VANCOMYCIN HYDROCHLORIDE 1750 MG: 1 INJECTION, POWDER, LYOPHILIZED, FOR SOLUTION INTRAVENOUS at 22:00

## 2018-05-23 RX ADMIN — DOCUSATE SODIUM 100 MG: 100 CAPSULE, LIQUID FILLED ORAL at 22:05

## 2018-05-23 RX ADMIN — ACETAMINOPHEN 650 MG: 325 TABLET, FILM COATED ORAL at 18:19

## 2018-05-23 RX ADMIN — CEFEPIME HYDROCHLORIDE 2000 MG: 2 INJECTION, POWDER, FOR SOLUTION INTRAVENOUS at 20:04

## 2018-05-23 NOTE — SEPSIS NOTE
Sepsis Note   Yakov Kitchen 61 y o  male MRN: [id number]  Unit/Bed#: ED 20 Encounter: [id number]            Initial Sepsis Screening     Row Name 05/1960                Is the patient's history suggestive of a new or worsening infection? (!)  Yes (Proceed)  -MH        Suspected source of infection pneumonia  -MH        Are two or more of the following signs & symptoms of infection both present and new to the patient? (!)  Yes (Proceed)  -        Indicate SIRS criteria Tachycardia > 90 bpm;Leukocytosis (WBC > 07887 IJL)  -        If the answer is yes to both questions, suspicion of sepsis is present          If severe sepsis is present AND tissue hypoperfusion perists in the hour after fluid resuscitation or lactate > 4, the patient meets criteria for SEPTIC SHOCK          Are any of the following organ dysfunction criteria present within 6 hours of suspected infection and SIRS criteria that are NOT considered to be chronic conditions? No  -MH        Organ dysfunction          Date of presentation of severe sepsis          Time of presentation of severe sepsis          Tissue hypoperfusion persists in the hour after crystalloid fluid administration, evidenced, by either:          Was hypotension present within one hour of the conclusion of crystalloid fluid administration?           Date of presentation of septic shock          Time of presentation of septic shock            User Key  (r) = Recorded By, (t) = Taken By, (c) = Cosigned By    [address] Name Provider Type    Nidia Sofia MD Physician

## 2018-05-23 NOTE — ED PROCEDURE NOTE
PROCEDURE  CriticalCare Time  Performed by: Tabby Reyna by: Tereza Mckeon     Critical care provider statement:     Critical care time (minutes):  35    Critical care time was exclusive of:  Separately billable procedures and treating other patients and teaching time    Critical care was necessary to treat or prevent imminent or life-threatening deterioration of the following conditions:  Sepsis    Critical care was time spent personally by me on the following activities:  Blood draw for specimens, obtaining history from patient or surrogate, development of treatment plan with patient or surrogate, discussions with consultants, evaluation of patient's response to treatment, examination of patient, interpretation of cardiac output measurements, ordering and performing treatments and interventions, ordering and review of laboratory studies, ordering and review of radiographic studies, re-evaluation of patient's condition and review of old Behzad Muse MD  05/23/18 1041

## 2018-05-23 NOTE — ED ATTENDING ATTESTATION
Zach Avitia MD, saw and evaluated the patient  I have discussed the patient with the resident/non-physician practitioner and agree with the resident's/non-physician practitioner's findings, Plan of Care, and MDM as documented in the resident's/non-physician practitioner's note, except where noted  All available labs and Radiology studies were reviewed  At this point I agree with the current assessment done in the Emergency Department  I have conducted an independent evaluation of this patient a history and physical is as follows:  62 y/o M presents for evaluation of chills, malaise, cough, fatigue, weakness, fever x 1 day  +left food ulcer which he sees wound care for and states is unchangd  s/p cabg  Denies cp, sob, abd pain, diarrhea, constipatoion  +urinary urgency  10 systems reviewed and otherwise negative  On exam nad, neuro nml, lungs nml, cardiac nml, abd nml, skin: Cabg incision site with out evidence of infection, L heel ulcer with eschar present, serous drainage, surrounding erythema with out warmth, ttp, +palpable pulse   MDM: fever fatigue cough-will do septic/cardiac work up, xray l heel wounds, admit    Critical Care Time  CritCare Time    Procedures

## 2018-05-23 NOTE — ED PROVIDER NOTES
History  Chief Complaint   Patient presents with    Weakness - Generalized     Pt reports generalized weakness and fever since this morning  Pt had cardiac surgery 3 weeks ago, denies CP, SOB  Pt has open wound on L foot, seeing wound care for issue  60 yo male with PMHx including DM, CAD with recent CABG 3 weeks ago, and chronic L foot ulcer presents to the ED for generalized weakness and fever  Reports he has not been feeling well for the last few days with malaise, myalgias, and fever  His wife took his temperature last night and it was between 102 and 103  No antipyretics today, but does take ASA 81mg daily  Denies abd pain, chest pain, SOB, N/V/D  Reports intermittent dry cough that is not new  States he has had urinary problems for "a while" but has never looked into it  Endorses frequency  DDX includes but is not limited to: myocarditis, pericarditis, cellulitis, osteomyelitis, pneumonia, UTI, ACS            Prior to Admission Medications   Prescriptions Last Dose Informant Patient Reported?  Taking?   aspirin 325 mg tablet   No Yes   Sig: Take 1 tablet (325 mg total) by mouth daily   atorvastatin (LIPITOR) 80 mg tablet   No Yes   Sig: Take 1 tablet (80 mg total) by mouth daily with dinner   docusate sodium (COLACE) 100 mg capsule   No Yes   Sig: Take 1 capsule (100 mg total) by mouth 2 (two) times a day   ferrous sulfate 220 (44 Fe) mg/5 mL solution   Yes Yes   Sig: Take 220 mg by mouth daily   ferrous sulfate 325 (65 Fe) mg tablet   Yes Yes   Sig: Take 325 mg by mouth 2 (two) times a day with meals   fludrocortisone (FLORINEF) 0 1 mg tablet   Yes Yes   Sig: Take 0 1 mg by mouth daily   furosemide (LASIX) 20 mg tablet   Yes Yes   Sig: Take 20 mg by mouth daily   insulin aspart (NovoLOG) 100 units/mL injection   Yes Yes   Sig: Inject 10 Units under the skin 3 (three) times a day before meals   insulin detemir (LEVEMIR) 100 units/mL subcutaneous injection   Yes Yes   Sig: Inject 20 Units under the skin daily at bedtime   lactulose (CEPHULAC) 10 g packet   Yes Yes   Sig: Take 10 g by mouth daily as needed   metoclopramide (REGLAN) 5 mg tablet   Yes Yes   Sig: Take 5 mg by mouth daily   midodrine (PROAMATINE) 10 MG tablet   No Yes   Sig: Take 1 tablet (10 mg total) by mouth 3 (three) times a day with meals   ondansetron (ZOFRAN-ODT) 4 mg disintegrating tablet   Yes Yes   Sig: Take 4 mg by mouth every 8 (eight) hours as needed for nausea or vomiting   pantoprazole (PROTONIX) 40 mg tablet   Yes Yes   Sig: Take 40 mg by mouth daily   polyethylene glycol (MIRALAX) 17 g packet   No Yes   Sig: Take 17 g by mouth daily   tamsulosin (FLOMAX) 0 4 mg   No Yes   Sig: Take 1 capsule (0 4 mg total) by mouth daily with dinner      Facility-Administered Medications: None       Past Medical History:   Diagnosis Date    Anemia     Anxiety and depression     Autonomic neuropathy     Cataract     Diabetes mellitus ([facility] )     Diabetic autonomic neuropathy associated with type 2 diabetes mellitus ([facility] )     Last Assessed: 12/28/2017    Gastroparesis due to DM (HCC)     History of DVT (deep vein thrombosis)     left LE    HTN (hypertension)     Hyperlipidemia     Non healing left heel wound     Obesity     Orthostatic hypotension        Past Surgical History:   Procedure Laterality Date    MS CABG, ARTERY-VEIN, FOUR N/A 3/28/2018    Procedure: CORONARY ARTERY BYPASS GRAFT (CABG) x3 VESSELS, LIMA TO LAD, SVG--> PDA, SVG--> OM2,  RIGHT LEG EVH/SVH TO , INTRA-OP AMANDEEP;  Surgeon: Eddie Huang MD;  [facility];  Service: Cardiac Surgery    ROTATOR CUFF REPAIR Bilateral        Family History   Problem Relation Age of Onset    Atrial fibrillation Mother     Stroke Mother     Hypertension Father     Heart attack Paternal Grandfather 61     I have reviewed and agree with the history as documented      Social History   Substance Use Topics    Smoking status: Former Smoker     Packs/day: 1 00     Years: 12 00 Types: Cigarettes     Quit date: 3/23/1994    Smokeless tobacco: Never Used    Alcohol use No        Review of Systems   Constitutional: Positive for chills, fatigue and fever  HENT: Negative for congestion, nosebleeds, postnasal drip, rhinorrhea, sinus pain and voice change  Eyes: Negative for visual disturbance  Respiratory: Positive for cough  Negative for choking and shortness of breath  Cardiovascular: Negative for chest pain and leg swelling  Gastrointestinal: Negative for abdominal pain, constipation, diarrhea, nausea and vomiting  Genitourinary: Positive for frequency  Negative for dysuria  Musculoskeletal: Negative for back pain  Skin: Positive for wound  Negative for rash  Neurological: Positive for weakness and headaches  Negative for dizziness and light-headedness  All other systems reviewed and are negative  Physical Exam  Physical Exam   Constitutional: He is oriented to person, place, and time  He appears well-developed and well-nourished  No distress  Appears fatigued, no acute distress   HENT:   Head: Normocephalic and atraumatic  Right Ear: External ear normal    Left Ear: External ear normal    Nose: Nose normal    Mouth/Throat: Oropharynx is clear and moist    Eyes: Conjunctivae and EOM are normal  Pupils are equal, round, and reactive to light  Neck: Normal range of motion  Neck supple  Cardiovascular: Normal rate, regular rhythm and normal heart sounds  Exam reveals no gallop and no friction rub  No murmur heard  Pulmonary/Chest: Effort normal and breath sounds normal  No respiratory distress  He has no wheezes  He has no rales  Abdominal: Soft  Bowel sounds are normal  He exhibits no distension  There is no tenderness  Musculoskeletal: Normal range of motion  Round ulcer ~7cm in diameter on the L heel  Eschar in the base  No surrounding erythema or purulent drainage  Serous drainage noted on bandages that were removed      Neurological: He is alert and oriented to person, place, and time  Skin: Skin is warm and dry  He is not diaphoretic  Psychiatric: He has a normal mood and affect  His behavior is normal  Judgment and thought content normal        Vital Signs  ED Triage Vitals [05/23/18 1752]   Temperature Pulse Respirations Blood Pressure SpO2   99 2 °F (37 3 °C) 97 20 (!) 174/77 93 %      Temp Source Heart Rate Source Patient Position - Orthostatic VS BP Location FiO2 (%)   Oral Monitor Lying Right arm --      Pain Score       No Pain           Vitals:    05/23/18 1752 05/23/18 1900   BP: (!) 174/77 130/65   Pulse: 97 93   Patient Position - Orthostatic VS: Lying Lying       Visual Acuity  Visual Acuity      Most Recent Value   L Pupil Size (mm)  3   R Pupil Size (mm)  3          ED Medications  Medications   vancomycin (VANCOCIN) 1,750 mg in sodium chloride 0 9 % 500 mL IVPB (not administered)   cefepime (MAXIPIME) 2 g/50 mL dextrose IVPB (2,000 mg Intravenous New Bag 5/23/18 2004)   acetaminophen (TYLENOL) tablet 650 mg (650 mg Oral Given 5/23/18 1819)       Diagnostic Studies  Results Reviewed     Procedure Component Value Units Date/Time    UA w Reflex to Microscopic w Reflex to Culture [[id number]]  (Abnormal) Collected:  05/23/18 1940    Lab Status:  Final result Specimen:  Urine from Urine, Clean Catch Updated:  05/23/18 1958     Color, UA Yellow     Clarity, UA Clear     Specific [place], UA 1 020     pH, UA 7 5     Leukocytes, UA Negative     Nitrite, UA Negative     Protein, UA 30 (1+) (A) mg/dl      Glucose, UA Negative mg/dl      Ketones, UA Negative mg/dl      Urobilinogen, UA 0 2 E U /dl      Bilirubin, UA Negative     Blood, UA Small (A)    Urine Microscopic [[id number]] Collected:  05/23/18 1940    Lab Status:   In process Specimen:  Urine from Urine, Clean Catch Updated:  05/23/18 1958    Troponin I [[id number]]     Lab Status:  No result Specimen:  Blood     Troponin I [[id number]]  (Abnormal) Collected:  05/23/18 1840    Lab Status: Final result Specimen:  Blood from Arm, Right Updated:  05/23/18 1908     Troponin I 0 06 (H) ng/mL     Narrative:         [employer] Chemistry analyzer 99% cutoff is > 0 04 ng/mL in network labs    o cTnI 99% cutoff is useful only when applied to patients in the clinical setting of myocardial ischemia  o cTnI 99% cutoff should be interpreted in the context of clinical history, ECG findings and possibly cardiac imaging to establish correct diagnosis  o cTnI 99% cutoff may be suggestive but clearly not indicative of a coronary event without the clinical setting of myocardial ischemia  Lactic Acid x2 [[id number]]  (Normal) Collected:  05/23/18 1757    Lab Status:  Final result Specimen:  Blood from Arm, Right Updated:  05/23/18 1833     LACTIC ACID 1 2 mmol/L     Narrative:         Result may be elevated if tourniquet was used during collection  Comprehensive metabolic panel [[id number]]  (Abnormal) Collected:  05/23/18 1757    Lab Status:  Final result Specimen:  Blood from Arm, Right Updated:  05/23/18 1829     Sodium 134 (L) mmol/L      Potassium 3 8 mmol/L      Chloride 98 (L) mmol/L      CO2 29 mmol/L      Anion Gap 7 mmol/L      BUN 13 mg/dL      Creatinine 0 90 mg/dL      Glucose 142 (H) mg/dL      Calcium 8 8 mg/dL      AST 29 U/L      ALT 23 U/L      Alkaline Phosphatase 60 U/L      Total Protein 8 3 (H) g/dL      Albumin 2 9 (L) g/dL      Total Bilirubin 0 39 mg/dL      eGFR 94 ml/min/1 73sq m     Narrative:         National Kidney Disease Education Program recommendations are as follows:  GFR calculation is accurate only with a steady state creatinine  Chronic Kidney disease less than 60 ml/min/1 73 sq  meters  Kidney failure less than 15 ml/min/1 73 sq  meters      APTT [[id number]]  (Normal) Collected:  05/23/18 1756    Lab Status:  Final result Specimen:  Blood from Arm, Right Updated:  05/23/18 1816     PTT 30 seconds     Protime-INR [[id number]]  (Abnormal) Collected:  05/23/18 1756    Lab Status:  Final result Specimen:  Blood from Arm, Right Updated:  05/23/18 1816     Protime 14 6 (H) seconds      INR 1 13    Blood culture #2 [[id number]] Collected:  05/23/18 1805    Lab Status: In process Specimen:  Blood from Arm, Left Updated:  05/23/18 1808    CBC and differential [[id number]]  (Abnormal) Collected:  05/23/18 1757    Lab Status:  Final result Specimen:  Blood from Arm, Right Updated:  05/23/18 1808     WBC 8 40 Thousand/uL      RBC 4 26 Million/uL      Hemoglobin 11 1 (L) g/dL      Hematocrit 35 4 (L) %      MCV 83 fL      MCH 26 1 (L) pg      MCHC 31 4 g/dL      RDW 14 9 %      MPV 9 5 fL      Platelets 457 Thousands/uL      nRBC 0 /100 WBCs      Neutrophils Relative 83 (H) %      Lymphocytes Relative 9 (L) %      Monocytes Relative 7 %      Eosinophils Relative 1 %      Basophils Relative 0 %      Neutrophils Absolute 6 93 Thousands/µL      Lymphocytes Absolute 0 79 Thousands/µL      Monocytes Absolute 0 57 Thousand/µL      Eosinophils Absolute 0 09 Thousand/µL      Basophils Absolute 0 02 Thousands/µL     Blood culture #1 [[id number]] Collected:  05/23/18 1756    Lab Status: In process Specimen:  Blood from Arm, Right Updated:  05/23/18 1804    Lactic Acid x2 [[id number]]     Lab Status:  No result Specimen:  Blood                  XR foot 3+ vw left   ED Interpretation by Yvonne Muñoz MD (05/23 1940)   Primary reviewed: no acute abnormality      XR chest 2 views   ED Interpretation by Yvonne Muñoz MD (05/23 1940)   Primary reviewed: retrocardiac infiltrate                 Procedures  Procedures       Phone Contacts  ED Phone Contact    ED Course  ED Course as of May 23 2007   Wed May 23, 2018   1917 Troponin I: (!) 0 06                         Initial Sepsis Screening     Row Name [birth year]/1959                Is the patient's history suggestive of a new or worsening infection?  (!)  Yes (Proceed)  -MH        Suspected source of infection pneumonia  -MH        Are two or more of the following signs & symptoms of infection both present and new to the patient? (!)  Yes (Proceed)  -MH        Indicate SIRS criteria Tachycardia > 90 bpm;Leukocytosis (WBC > 64426 IJL)  -        If the answer is yes to both questions, suspicion of sepsis is present          If severe sepsis is present AND tissue hypoperfusion perists in the hour after fluid resuscitation or lactate > 4, the patient meets criteria for SEPTIC SHOCK          Are any of the following organ dysfunction criteria present within 6 hours of suspected infection and SIRS criteria that are NOT considered to be chronic conditions? No  -MH        Organ dysfunction          Date of presentation of severe sepsis          Time of presentation of severe sepsis          Tissue hypoperfusion persists in the hour after crystalloid fluid administration, evidenced, by either:          Was hypotension present within one hour of the conclusion of crystalloid fluid administration?         Date of presentation of septic shock          Time of presentation of septic shock            User Key  (r) = Recorded By, (t) = Taken By, (c) = Cosigned By    [address] Name Provider Type    Khadar Mills MD Physician                  MDM  Number of Diagnoses or Management Options  Healthcare-associated pneumonia:   NSTEMI (non-ST elevated myocardial infarction) [facility]):   Diagnosis management comments: 60 yo male with several day history of generalized malaise and weakness with fever of 102+ at home  Afebrile here with no antipyretics today, but does take ASA 325mg daily  Normal WBC and lactic here, but CXR shows a likely retrocardiac consolidation consistent with PNA  Trop is 0 06  Difficult to determine if this is a slow trend downward from his positive troponin during his last admission which led to CABG vs new nSTEMI  No EKG changes today  Will cover for HCAP with cefepime and vanc  Admit  Pt and wife in agreement with plan      CritCare Time    Disposition  Final diagnoses: Healthcare-associated pneumonia   NSTEMI (non-ST elevated myocardial infarction) [facility])     Time reflects when diagnosis was documented in both MDM as applicable and the Disposition within this note     Time User Action Codes Description Comment    5/23/2018  8:06 PM Godwin Rowell [J18 9] Healthcare-associated pneumonia     5/23/2018  8:06 PM Tracy Rowell Add [I21 4] NSTEMI (non-ST elevated myocardial infarction) [facility])       ED Disposition     ED Disposition Condition Comment    Admit  Case was discussed with [employer] and the patient's admission status was agreed to be Admission Status: inpatient status to the service of Dr Melissa Wise   Follow-up Information    None         Patient's Medications   Discharge Prescriptions    No medications on file     No discharge procedures on file      ED Provider  Electronically Signed by           Zeferino Nazario PA-C  05/23/18 2008

## 2018-05-24 ENCOUNTER — TELEPHONE (OUTPATIENT)
Dept: RADIOLOGY | Facility: HOSPITAL | Age: 58
End: 2018-05-24

## 2018-05-24 ENCOUNTER — APPOINTMENT (INPATIENT)
Dept: NON INVASIVE DIAGNOSTICS | Facility: HOSPITAL | Age: 58
DRG: 866 | End: 2018-05-24
Payer: COMMERCIAL

## 2018-05-24 LAB
ANION GAP SERPL CALCULATED.3IONS-SCNC: 11 MMOL/L (ref 4–13)
ATRIAL RATE: 96 BPM
BUN SERPL-MCNC: 12 MG/DL (ref 5–25)
CALCIUM SERPL-MCNC: 8.2 MG/DL (ref 8.3–10.1)
CHLORIDE SERPL-SCNC: 101 MMOL/L (ref 100–108)
CO2 SERPL-SCNC: 27 MMOL/L (ref 21–32)
CREAT SERPL-MCNC: 0.76 MG/DL (ref 0.6–1.3)
ERYTHROCYTE [DISTWIDTH] IN BLOOD BY AUTOMATED COUNT: 15.1 % (ref 11.6–15.1)
GFR SERPL CREATININE-BSD FRML MDRD: 101 ML/MIN/1.73SQ M
GLUCOSE SERPL-MCNC: 104 MG/DL (ref 65–140)
GLUCOSE SERPL-MCNC: 185 MG/DL (ref 65–140)
GLUCOSE SERPL-MCNC: 249 MG/DL (ref 65–140)
GLUCOSE SERPL-MCNC: 69 MG/DL (ref 65–140)
GLUCOSE SERPL-MCNC: 82 MG/DL (ref 65–140)
HCT VFR BLD AUTO: 31.9 % (ref 36.5–49.3)
HGB BLD-MCNC: 10 G/DL (ref 12–17)
MCH RBC QN AUTO: 26.2 PG (ref 26.8–34.3)
MCHC RBC AUTO-ENTMCNC: 31.3 G/DL (ref 31.4–37.4)
MCV RBC AUTO: 84 FL (ref 82–98)
P AXIS: 69 DEGREES
PLATELET # BLD AUTO: 242 THOUSANDS/UL (ref 149–390)
PMV BLD AUTO: 10 FL (ref 8.9–12.7)
POTASSIUM SERPL-SCNC: 3.2 MMOL/L (ref 3.5–5.3)
PR INTERVAL: 136 MS
QRS AXIS: 0 DEGREES
QRSD INTERVAL: 84 MS
QT INTERVAL: 344 MS
QTC INTERVAL: 434 MS
RBC # BLD AUTO: 3.82 MILLION/UL (ref 3.88–5.62)
SODIUM SERPL-SCNC: 139 MMOL/L (ref 136–145)
T WAVE AXIS: 48 DEGREES
TROPONIN I SERPL-MCNC: 0.07 NG/ML
VENTRICULAR RATE: 96 BPM
WBC # BLD AUTO: 8.41 THOUSAND/UL (ref 4.31–10.16)

## 2018-05-24 PROCEDURE — 93926 LOWER EXTREMITY STUDY: CPT

## 2018-05-24 PROCEDURE — 80048 BASIC METABOLIC PNL TOTAL CA: CPT | Performed by: PHYSICIAN ASSISTANT

## 2018-05-24 PROCEDURE — G8987 SELF CARE CURRENT STATUS: HCPCS

## 2018-05-24 PROCEDURE — 93922 UPR/L XTREMITY ART 2 LEVELS: CPT | Performed by: SURGERY

## 2018-05-24 PROCEDURE — 82948 REAGENT STRIP/BLOOD GLUCOSE: CPT

## 2018-05-24 PROCEDURE — 99254 IP/OBS CNSLTJ NEW/EST MOD 60: CPT | Performed by: INTERNAL MEDICINE

## 2018-05-24 PROCEDURE — 99232 SBSQ HOSP IP/OBS MODERATE 35: CPT | Performed by: INTERNAL MEDICINE

## 2018-05-24 PROCEDURE — 85027 COMPLETE CBC AUTOMATED: CPT | Performed by: PHYSICIAN ASSISTANT

## 2018-05-24 PROCEDURE — 93926 LOWER EXTREMITY STUDY: CPT | Performed by: SURGERY

## 2018-05-24 PROCEDURE — 84484 ASSAY OF TROPONIN QUANT: CPT | Performed by: PHYSICIAN ASSISTANT

## 2018-05-24 PROCEDURE — G8988 SELF CARE GOAL STATUS: HCPCS

## 2018-05-24 PROCEDURE — 97166 OT EVAL MOD COMPLEX 45 MIN: CPT

## 2018-05-24 RX ORDER — POTASSIUM CHLORIDE 20 MEQ/1
20 TABLET, EXTENDED RELEASE ORAL ONCE
Status: COMPLETED | OUTPATIENT
Start: 2018-05-24 | End: 2018-05-24

## 2018-05-24 RX ADMIN — CEFEPIME HYDROCHLORIDE 2000 MG: 2 INJECTION, POWDER, FOR SOLUTION INTRAVENOUS at 08:36

## 2018-05-24 RX ADMIN — MIDODRINE HYDROCHLORIDE 10 MG: 5 TABLET ORAL at 08:36

## 2018-05-24 RX ADMIN — METOCLOPRAMIDE HYDROCHLORIDE 5 MG: 10 TABLET ORAL at 08:30

## 2018-05-24 RX ADMIN — POTASSIUM CHLORIDE 20 MEQ: 1500 TABLET, EXTENDED RELEASE ORAL at 08:29

## 2018-05-24 RX ADMIN — ATORVASTATIN CALCIUM 80 MG: 80 TABLET, FILM COATED ORAL at 18:00

## 2018-05-24 RX ADMIN — FERROUS SULFATE TAB 325 MG (65 MG ELEMENTAL FE) 325 MG: 325 (65 FE) TAB at 08:29

## 2018-05-24 RX ADMIN — FERROUS SULFATE TAB 325 MG (65 MG ELEMENTAL FE) 325 MG: 325 (65 FE) TAB at 17:59

## 2018-05-24 RX ADMIN — INSULIN GLARGINE 70 UNITS: 100 INJECTION, SOLUTION SUBCUTANEOUS at 08:35

## 2018-05-24 RX ADMIN — FLUDROCORTISONE ACETATE 0.1 MG: 0.1 TABLET ORAL at 09:45

## 2018-05-24 RX ADMIN — ENOXAPARIN SODIUM 40 MG: 40 INJECTION SUBCUTANEOUS at 08:28

## 2018-05-24 RX ADMIN — CEFEPIME HYDROCHLORIDE 2000 MG: 2 INJECTION, POWDER, FOR SOLUTION INTRAVENOUS at 21:00

## 2018-05-24 RX ADMIN — VANCOMYCIN HYDROCHLORIDE 1500 MG: 1 INJECTION, POWDER, LYOPHILIZED, FOR SOLUTION INTRAVENOUS at 22:25

## 2018-05-24 RX ADMIN — DOCUSATE SODIUM 100 MG: 100 CAPSULE, LIQUID FILLED ORAL at 18:01

## 2018-05-24 RX ADMIN — VANCOMYCIN HYDROCHLORIDE 1500 MG: 1 INJECTION, POWDER, LYOPHILIZED, FOR SOLUTION INTRAVENOUS at 09:39

## 2018-05-24 RX ADMIN — TAMSULOSIN HYDROCHLORIDE 0.4 MG: 0.4 CAPSULE ORAL at 18:00

## 2018-05-24 RX ADMIN — INSULIN LISPRO 4 UNITS: 100 INJECTION, SOLUTION INTRAVENOUS; SUBCUTANEOUS at 18:02

## 2018-05-24 RX ADMIN — ASPIRIN 325 MG: 325 TABLET ORAL at 08:30

## 2018-05-24 RX ADMIN — FUROSEMIDE 20 MG: 20 TABLET ORAL at 08:29

## 2018-05-24 RX ADMIN — DOCUSATE SODIUM 100 MG: 100 CAPSULE, LIQUID FILLED ORAL at 08:29

## 2018-05-24 RX ADMIN — INSULIN GLARGINE 70 UNITS: 100 INJECTION, SOLUTION SUBCUTANEOUS at 21:47

## 2018-05-24 RX ADMIN — INSULIN LISPRO 1 UNITS: 100 INJECTION, SOLUTION INTRAVENOUS; SUBCUTANEOUS at 21:47

## 2018-05-24 NOTE — PROGRESS NOTES
[facility] Internal Medicine Progress Note  Patient: Claude Cough 61 y o  male   MRN: [id number]  PCP: Isabel Flores DO  Unit/Bed#: E4 -01 Encounter: [id number]  Date Of Visit: 05/24/18    Assessment:    Principal Problem:    Healthcare-associated pneumonia  Active Problems:    Hyperlipidemia    Vitamin D deficiency    PAD (peripheral artery disease) (HCC)    Elevated troponin    Non-healing wound of left heel    Orthostatic hypotension    Diabetic gastroparesis (HCC)    S/P CABG x 3    Chronic CHF ([address] )      Plan:    · Healthcare associated pneumonia after recent course of rehab post CABG more recently and diabetic foot ulcer presents with fever and cough  Chest x-ray showing left basilar pneumonia presumably healthcare acquired will treat with cefepime and vancomycin  Not producing sputum but essential we also do pulmonary toilet given recent CABG  Lactic acid was within normal limits will get Infectious Disease   · Peripheral arterial disease with left heel diabetic foot ulcer  undergoing ongoing wound care at rehab will ask podiatry to follow while here/x-ray did not show any signs of osteomyelitis may need vascular workup will order LEAD  · Elevated troponin may be in relation to recent CABG will trend  · Diabetes mellitus/diabetic gastroparesis continue sliding scale coverage and Lantus 70 units q 12continue Reglan  · Hyperlipidemia continue atorvastatin 80 mg  · Recent CABG 3 vessel continue aspirin continue statin      VTE Pharmacologic Prophylaxis:   Pharmacologic: Enoxaparin (Lovenox)  Mechanical VTE Prophylaxis in Place: Yes    Discussions with Specialists or Other Care Team Provider: no    Time Spent for Care: 45 minutes  More than 50% of total time spent on counseling and coordination of care as described above        Subjective:   Alert oriented remains weak but no further febrile presentation incident admission he has difficulty in obtaining any sputum production he is able to ambulate with the help will walker and partial weight-bearing of left leg and wants to be able to go the bathroom  Objective:     Vitals:   Temp (24hrs), Av 8 °F (37 1 °C), Min:98 2 °F (36 8 °C), Max:99 5 °F (37 5 °C)    HR:  [68-97] 68  Resp:  [18-20] 18  BP: (103-174)/(62-77) 145/73  SpO2:  [93 %-97 %] 97 %  Body mass index is 33 83 kg/m²  Input and Output Summary (last 24 hours): Intake/Output Summary (Last 24 hours) at 18 0831  Last data filed at 18 0000   Gross per 24 hour   Intake             1060 ml   Output             1000 ml   Net               60 ml       Physical Exam:     Physical Exam:   General appearance: alert, appears stated age and cooperative  Head: Normocephalic, without obvious abnormality, atraumatic  Lungs: clear to auscultation bilaterally  Heart: regular rate and rhythm  Abdomen: soft, non-tender; bowel sounds normal; no masses,  no organomegaly  Back: negative  Extremities: Diminished pulses peripherally and heel ulceration stage II noted and extremities normal, atraumatic, no cyanosis or edema  Neurologic: Grossly normal      Additional Data:     Labs:      Results from last 7 days  Lab Units 18  0541 18  1757   WBC Thousand/uL 8 41 8 40   HEMOGLOBIN g/dL 10 0* 11 1*   HEMATOCRIT % 31 9* 35 4*   PLATELETS Thousands/uL 242 270   NEUTROS PCT %  --  83*   LYMPHS PCT %  --  9*   MONOS PCT %  --  7   EOS PCT %  --  1       Results from last 7 days  Lab Units 18  0541 18  1757   SODIUM mmol/L 139 134*   POTASSIUM mmol/L 3 2* 3 8   CHLORIDE mmol/L 101 98*   CO2 mmol/L 27 29   BUN mg/dL 12 13   CREATININE mg/dL 0 76 0 90   CALCIUM mg/dL 8 2* 8 8   TOTAL PROTEIN g/dL  --  8 3*   BILIRUBIN TOTAL mg/dL  --  0 39   ALK PHOS U/L  --  60   ALT U/L  --  23   AST U/L  --  29   GLUCOSE RANDOM mg/dL 69 142*       Results from last 7 days  Lab Units 18  1756   INR  1 13       * I Have Reviewed All Lab Data Listed Above  * Additional Pertinent Lab Tests Reviewed:  All [place] Admission Reviewed    Imaging:  Xr Chest 2 Views    Result Date: 5/23/2018  Narrative: CHEST INDICATION:   fever  COMPARISON:  3/29/2018 EXAM PERFORMED/VIEWS:  XR CHEST PA & LATERAL FINDINGS: Interval removal of left chest tube and right IJ venous catheter  Postsurgical change from median sternotomy  Mild cardiomegaly  Left posterior basilar airspace consolidation  No pneumothorax or pleural effusion  Postsurgical change in the left shoulder  Chronic right posterior 7th rib fracture  Impression: Left posterior basilar airspace consolidation may represent pneumonia  Workstation performed: [id number]     Xr Foot 3+ Vw Left    Result Date: 5/23/2018  Narrative: LEFT FOOT INDICATION:   Concern for osteomyelitis  Technologist reports the patient has a heel wound and redness and warmth of the lower extremity as well as fever  COMPARISON:  3/14/2018 VIEWS:  XR FOOT 3+ VW LEFT FINDINGS: Diffuse soft tissue swelling at the foot  Soft tissue lucency at the plantar aspect of the heel suggest large soft tissue wound  No subjacent osseous demineralization  No radiopaque foreign body  Articular alignment is maintained  No dislocation  Mild degenerative changes in the 1st MTP joint  No periarticular erosions  No acute fractures or focal lytic/blastic lesion  Thickening of the distal Achilles with insertional enthesopathy  Impression: Soft tissue swelling at the foot  Lucency of the heel suggest large wound  No subjacent bony demineralization to suggest osteomyelitis, though conventional radiographic sensitivity is somewhat limited  Workstation performed: [id number]     Imaging Reports Reviewed Today Include:  X-ray of left foot reviewed chest x-ray reviewed  Imaging Personally Reviewed by Myself Includes:    Procedure: Xr Chest 2 Views    Result Date: 5/23/2018  Narrative: CHEST INDICATION:   fever   COMPARISON:  3/29/2018 EXAM PERFORMED/VIEWS:  XR CHEST PA & LATERAL FINDINGS: Interval removal of left chest tube and right IJ venous catheter  Postsurgical change from median sternotomy  Mild cardiomegaly  Left posterior basilar airspace consolidation  No pneumothorax or pleural effusion  Postsurgical change in the left shoulder  Chronic right posterior 7th rib fracture  Impression: Left posterior basilar airspace consolidation may represent pneumonia  Workstation performed: [id number]     Procedure: Xr Foot 3+ Vw Left    Result Date: 5/23/2018  Narrative: LEFT FOOT INDICATION:   Concern for osteomyelitis  Technologist reports the patient has a heel wound and redness and warmth of the lower extremity as well as fever  COMPARISON:  3/14/2018 VIEWS:  XR FOOT 3+ VW LEFT FINDINGS: Diffuse soft tissue swelling at the foot  Soft tissue lucency at the plantar aspect of the heel suggest large soft tissue wound  No subjacent osseous demineralization  No radiopaque foreign body  Articular alignment is maintained  No dislocation  Mild degenerative changes in the 1st MTP joint  No periarticular erosions  No acute fractures or focal lytic/blastic lesion  Thickening of the distal Achilles with insertional enthesopathy  Impression: Soft tissue swelling at the foot  Lucency of the heel suggest large wound  No subjacent bony demineralization to suggest osteomyelitis, though conventional radiographic sensitivity is somewhat limited   Workstation performed: [id number]        Recent Cultures (last 7 days):           Last 24 Hours Medication List:     Current Facility-Administered Medications:  acetaminophen 650 mg Oral Q6H PRN Julianne Rothman, PA-JESSICA   aspirin 325 mg Oral Daily Julianne Cellar, PA-C   atorvastatin 80 mg Oral Daily With [employer], PA-C   cefepime 2,000 mg Intravenous Q12H Julianne Rothman, PA-C   docusate sodium 100 mg Oral BID Julianne Cellar, PA-C   enoxaparin 40 mg Subcutaneous Daily Julianne Cellar, PA-C   ferrous sulfate 325 mg Oral BID With Meals Julianne Rothman, PA-JESSICA fludrocortisone 0 1 mg Oral Daily Graciela Méndez PA-C   furosemide 20 mg Oral Daily Graciela Méndez PA-C   insulin glargine 70 Units Subcutaneous Q12H [address] Graciela Méndez PA-C   insulin lispro 1-5 Units Subcutaneous HS ANNY Aguilar-JESSICA   insulin lispro 2-12 Units Subcutaneous TID AC Graciela Méndez PA-C   metoclopramide 5 mg Oral Daily Graciela Méndez PA-C   midodrine 10 mg Oral TID With Meals Graciela Méndez PA-C   ondansetron 4 mg Intravenous Q6H PRN Graciela Méndez PA-C   polyethylene glycol 17 g Oral Daily PRN Graciela Méndez PA-C   tamsulosin 0 4 mg Oral Daily With [employer]NUNO   vancomycin 15 mg/kg (Adjusted) Intravenous Q12H Graciela Méndez PA-C        Today, Patient Was Seen By: Jaquan Ferrer MD    ** Please Note: Dragon 360 Dictation voice to text software may have been used in the creation of this document   **

## 2018-05-24 NOTE — H&P
History and Physical - [facility] Internal Medicine    Patient Information: Jefferson Castro 61 y o  male MRN: [id number]  Unit/Bed#: E4 -01 Encounter: [id number]  Admitting Physician: Carla Hassan PA-C  PCP: Tatyana Davies DO  Date of Admission:  05/23/18    Assessment/Plan:    Hospital Problem List:     Principal Problem:    Healthcare-associated pneumonia  Active Problems:    Hyperlipidemia    Vitamin D deficiency    PAD (peripheral artery disease) (HCC)    Elevated troponin    Non-healing wound of left heel    Orthostatic hypotension    Diabetic gastroparesis (HCC)    S/P CABG x 3    Chronic CHF ([facility] [address] )      Plan for the Primary Problem(s):  · pneumonia  · Admit to med/surg  Started on vanco/cefepime in ED due to recent hospitalization  Will continue    Plan for Additional Problems:   · Hyperlipidemia- continue lipitor  · PAD- was planning vascular procedure but required CABG first  · Elevated troponin- recent CABG 3/28  Trend troponin  Consult cardiology  · Nonhealing wound left heal- consult wound care and podiatry  · Orthostatic hypotension- on florinef and midodrine  · Diabetic gastroparesis- continue reglan  · Chronic CHF- does not appear to be fluid overloaded  Continue regular home dose of lasix    VTE Prophylaxis: Enoxaparin (Lovenox)  / sequential compression device   Code Status: full code  POLST: There is no POLST form on file for this patient (pre-hospital)    Anticipated Length of Stay:  Patient will be admitted on an Inpatient basis with an anticipated length of stay of  Greater than 2 midnights  Justification for Hospital Stay: patient requires IV antibiotics    Total Time for Visit, including Counseling / Coordination of Care: 45 minutes  Greater than 50% of this total time spent on direct patient counseling and coordination of care  Chief Complaint:   Generalized weakness    History of Present Illness:    Jefferson Castro is a 61 y o  male who presents with generalized weakness today    He had a recent admission for diabetic foot ulcer on 3/23  He underwent preop workup for vascular procedure  He was found to have elevated troponin  He had an abnormal stress test followed by a cardiac cath which showed multi vessel disease  He then had a CABG on 3/28  He did well post op and went to [address] [place] [address] for rehab  His cardiac rehab has been hindered by his heel ulcer  He has had a cough for 2 days  He denies chest pain  Review of Systems:    Review of Systems   Constitutional: Positive for fatigue and fever  HENT: Negative  Eyes: Negative  Respiratory: Positive for cough  Cardiovascular: Negative  Gastrointestinal: Negative  Endocrine: Negative  Genitourinary: Negative  Musculoskeletal: Positive for gait problem  Skin: Positive for wound  Allergic/Immunologic: Negative  Hematological: Negative  Psychiatric/Behavioral: Negative  Past Medical and Surgical History:     Past Medical History:   Diagnosis Date    Anemia     Anxiety and depression     Autonomic neuropathy     Cataract     Diabetes mellitus ([facility] [address] )     Diabetic autonomic neuropathy associated with type 2 diabetes mellitus (HCC)     Last Assessed: 12/28/2017    Gastroparesis due to DM (HCC)     History of DVT (deep vein thrombosis)     left LE    HTN (hypertension)     Hyperlipidemia     Non healing left heel wound     Obesity     Orthostatic hypotension        Past Surgical History:   Procedure Laterality Date    MA CABG, ARTERY-VEIN, FOUR N/A 3/28/2018    Procedure: CORONARY ARTERY BYPASS GRAFT (CABG) x3 VESSELS, LIMA TO LAD, SVG--> PDA, SVG--> OM2,  RIGHT LEG EVH/SVH TO , INTRA-OP AMANDEEP;  Surgeon: Chris Mcmahon MD;  [facility]: [facility];  Service: Cardiac Surgery    ROTATOR CUFF REPAIR Bilateral        Meds/Allergies:    Prior to Admission medications    Medication Sig Start Date End Date Taking?  Authorizing Provider   aspirin 325 mg tablet Take 1 tablet (325 mg total) by mouth daily 4/5/18  Yes Shay Davis PA-C   atorvastatin (LIPITOR) 80 mg tablet Take 1 tablet (80 mg total) by mouth daily with dinner 4/4/18  Yes Shay Davis PA-C   docusate sodium (COLACE) 100 mg capsule Take 1 capsule (100 mg total) by mouth 2 (two) times a day 4/4/18  Yes Shay Davis PA-C   ferrous sulfate 220 (44 Fe) mg/5 mL solution Take 220 mg by mouth daily   Yes Historical Provider, MD   ferrous sulfate 325 (65 Fe) mg tablet Take 325 mg by mouth 2 (two) times a day with meals   Yes Historical Provider, MD   fludrocortisone (FLORINEF) 0 1 mg tablet Take 0 1 mg by mouth daily   Yes Historical Provider, MD   furosemide (LASIX) 20 mg tablet Take 20 mg by mouth daily   Yes Historical Provider, MD   insulin aspart (NovoLOG) 100 units/mL injection Inject 10 Units under the skin 3 (three) times a day before meals   Yes Historical Provider, MD   insulin detemir (LEVEMIR) 100 units/mL subcutaneous injection Inject 20 Units under the skin daily at bedtime   Yes Historical Provider, MD   lactulose (CEPHULAC) 10 g packet Take 10 g by mouth daily as needed   Yes Historical Provider, MD   metoclopramide (REGLAN) 5 mg tablet Take 5 mg by mouth daily   Yes Historical Provider, MD   midodrine (PROAMATINE) 10 MG tablet Take 1 tablet (10 mg total) by mouth 3 (three) times a day with meals 4/4/18  Yes Shay Davis PA-C   ondansetron (ZOFRAN-ODT) 4 mg disintegrating tablet Take 4 mg by mouth every 8 (eight) hours as needed for nausea or vomiting   Yes Historical Provider, MD   pantoprazole (PROTONIX) 40 mg tablet Take 40 mg by mouth daily   Yes Historical Provider, MD   polyethylene glycol (MIRALAX) 17 g packet Take 17 g by mouth daily 4/5/18  Yes Shay Davis PA-C   tamsulosin (FLOMAX) 0 4 mg Take 1 capsule (0 4 mg total) by mouth daily with dinner 4/4/18  Yes Shay Davis PA-C   acetaminophen (TYLENOL) 325 mg tablet Take 2 tablets (650 mg total) by mouth every 6 (six) hours as needed for mild pain, moderate pain or fever (temperature greater than 101 F) 4/4/18 5/23/18  Shay Davis PA-C   collagenase (SANTYL) ointment Apply topically daily 4/5/18 5/23/18  Shay Davis PA-C   Empagliflozin (JARDIANCE) 25 MG TABS Take 1 tablet (25 mg total) by mouth every morning 4/4/18 5/23/18  Shay Davis PA-C   insulin glargine (LANTUS) 100 units/mL subcutaneous injection Inject 12 Units under the skin daily at bedtime 4/4/18 5/23/18  Shay Davis PA-C   LORazepam (ATIVAN) 0 5 mg tablet Take 1 tablet (0 5 mg total) by mouth every 8 (eight) hours as needed for anxiety for up to 10 days 4/4/18 5/23/18  Shay Davis PA-C   omeprazole (PriLOSEC) 20 mg delayed release capsule Take 1 capsule (20 mg total) by mouth daily for 28 days 4/4/18 5/23/18  Shay Davis PA-C   potassium chloride (K-DUR,KLOR-CON) 10 mEq tablet Take 1 tablet (10 mEq total) by mouth daily 4/4/18 5/23/18  Rochelle Davis PA-C   torsemide (DEMADEX) 10 mg tablet Take 1 tablet (10 mg total) by mouth daily 2/21/18 5/23/18  Edna Pretty DO     I have reviewed home medications with patient personally  Allergies:    Allergies   Allergen Reactions    Metformin        Social History:     Marital Status: /Civil Union   Occupation: unemployed  Patient Pre-hospital Living Situation: lives with wife  Patient Pre-hospital Level of Mobility: uses walker  Patient Pre-hospital Diet Restrictions: diabetic  Substance Use History:   History   Alcohol Use No     History   Smoking Status    Former Smoker    Packs/day: 1 00    Years: 12 00    Types: Cigarettes    Quit date: 3/23/1994   Smokeless Tobacco    Never Used     History   Drug Use No       Family History:    non-contributory    Physical Exam:     Vitals:   Blood Pressure: 103/62 (05/23/18 2118)  Pulse: 83 (05/23/18 2118)  Temperature: 99 5 °F (37 5 °C) (05/23/18 2118)  Temp Source: Temporal (05/23/18 2118)  Respirations: 18 (05/23/18 2118)  Height: 6' 1" (185 4 cm) (05/23/18 2118)  Weight - Scale: 116 kg (256 lb 6 3 oz) (05/23/18 2118)  SpO2: 96 % (05/23/18 2118)    Physical Exam   Constitutional: He is oriented to person, place, and time  He appears well-developed and well-nourished  No distress  HENT:   Head: Normocephalic and atraumatic  Eyes: Conjunctivae are normal  Pupils are equal, round, and reactive to light  Neck: Normal range of motion  Neck supple  No JVD present  No tracheal deviation present  No thyromegaly present  Cardiovascular: Normal rate and regular rhythm  Pulmonary/Chest: Effort normal and breath sounds normal  No respiratory distress  He has no wheezes  Abdominal: Soft  Bowel sounds are normal  He exhibits no distension and no mass  There is no tenderness  There is no rebound and no guarding  Musculoskeletal: Normal range of motion  He exhibits no edema, tenderness or deformity  Lymphadenopathy:     He has no cervical adenopathy  Neurological: He is alert and oriented to person, place, and time  Skin: He is not diaphoretic  Chronic necrotic left heel wound with foul smelling odor but no drainage   Psychiatric: He has a normal mood and affect  His behavior is normal    Vitals reviewed  Additional Data:     Lab Results: I have personally reviewed pertinent reports  Results from last 7 days  Lab Units 05/23/18  1757   WBC Thousand/uL 8 40   HEMOGLOBIN g/dL 11 1*   HEMATOCRIT % 35 4*   PLATELETS Thousands/uL 270   NEUTROS PCT % 83*   LYMPHS PCT % 9*   MONOS PCT % 7   EOS PCT % 1       Results from last 7 days  Lab Units 05/23/18  1757   SODIUM mmol/L 134*   POTASSIUM mmol/L 3 8   CHLORIDE mmol/L 98*   CO2 mmol/L 29   BUN mg/dL 13   CREATININE mg/dL 0 90   CALCIUM mg/dL 8 8   TOTAL PROTEIN g/dL 8 3*   BILIRUBIN TOTAL mg/dL 0 39   ALK PHOS U/L 60   ALT U/L 23   AST U/L 29   GLUCOSE RANDOM mg/dL 142*       Results from last 7 days  Lab Units 05/23/18  1756   INR  1 13       Imaging: I have personally reviewed pertinent reports  No results found  EKG, Pathology, and Other Studies Reviewed on Admission:   · EKG: no acute ischemic changes    Allscripts / Epic Records Reviewed: Yes     ** Please Note: This note has been constructed using a voice recognition system   **

## 2018-05-24 NOTE — CASE MANAGEMENT
Initial Clinical Review    Admission: Date/Time/Statement: 5/23/18 @ 2007     Orders Placed This Encounter   Procedures    Inpatient Admission (expected length of stay for this patient is greater than two midnights)     Standing Status:   Standing     Number of Occurrences:   1     Order Specific Question:   Admitting Physician     Answer:   Caroline Mancera     Order Specific Question:   Level of Care     Answer:   Med Surg [16]     Order Specific Question:   Estimated length of stay     Answer:   More than 2 Midnights     Order Specific Question:   Certification     Answer:   I certify that inpatient services are medically necessary for this patient for a duration of greater than two midnights  See H&P and MD Progress Notes for additional information about the patient's course of treatment  ED: Date/Time/Mode of Arrival:   ED Arrival Information     Expected Arrival Acuity Means of Arrival Escorted By Service Admission Type    - 5/23/2018 17:45 Emergent Ambulance [facility] General Medicine Emergency    Arrival Complaint    Weakness          Chief Complaint:   Chief Complaint   Patient presents with    Weakness - Generalized     Pt reports generalized weakness and fever since this morning  Pt had cardiac surgery 3 weeks ago, denies CP, SOB  Pt has open wound on L foot, seeing wound care for issue  History of Illness: 60 yo male with PMHx including DM, CAD with recent CABG 3 weeks ago, and chronic L foot ulcer presents to the ED for generalized weakness and fever  Reports he has not been feeling well for the last few days with malaise, myalgias, and fever  His wife took his temperature last night and it was between 102 and 103  No antipyretics today, but does take ASA 81mg daily  Denies abd pain, chest pain, SOB, N/V/D  Reports intermittent dry cough that is not new  States he has had urinary problems for "a while" but has never looked into it   Endorses frequency      Round ulcer ~7cm in diameter on the L heel  Eschar in the base  No surrounding erythema or purulent drainage  Serous drainage noted on bandages that were removed  DDX includes but is not limited to: myocarditis, pericarditis, cellulitis, osteomyelitis, pneumonia, UTI, ACS       ED Vital Signs:   ED Triage Vitals [05/23/18 1752]   Temperature Pulse Respirations Blood Pressure SpO2   99 2 °F (37 3 °C) 97 20 (!) 174/77 93 %      Temp Source Heart Rate Source Patient Position - Orthostatic VS BP Location FiO2 (%)   Oral Monitor Lying Right arm --      Pain Score       No Pain        Wt Readings from Last 1 Encounters:   05/23/18 116 kg (256 lb 6 3 oz)       Abnormal Labs/Diagnostic Test Results:  Trop  0 06,   0 07,  ,   Cl 98,   Glu 142,   T Pro 8 3,   Alb 2 9,   H&H 11 1 / 35 4  Urine:  1+ protein,   Small blood,   Mod bacteria  BC's pendng  CXR: Left posterior basilar airspace consolidation may represent pneumonia  Left Foot  Xray: Soft tissue swelling at the foot   Lucency of the heel suggest large wound  No subjacent bony demineralization to suggest osteomyelitis, though conventional radiographic sensitivity is somewhat limited  EKG: Normal sinus rhythm    ED Treatment:   Medication Administration from 05/23/2018 1745 to 05/23/2018 2118       Date/Time Order Dose Route Action Action by Comments     05/23/2018 1819 acetaminophen (TYLENOL) tablet 650 mg 650 mg Oral Given Jessy Tidwell RN      05/23/2018 2059 cefepime (MAXIPIME) 2 g/50 mL dextrose IVPB 0 mg Intravenous Stopped Jessy Tidwell RN      05/23/2018 2004 cefepime (MAXIPIME) 2 g/50 mL dextrose IVPB 2,000 mg Intravenous New Bag Jessy Tidwell RN           Past Medical/Surgical History:    Active Ambulatory Problems     Diagnosis Date Noted    Anxiety and depression 12/28/2017    Benign essential HTN 01/11/2018    Diabetic neuropathy, type II diabetes mellitus ([facility] [address] ) 11/06/2014    Hyperlipidemia 02/11/2015    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 ([facility] [address] ) 12/28/2017    Vitamin D deficiency 09/20/2016    PAD (peripheral artery disease) ([facility] [address] ) 03/14/2018    Elevated troponin 03/14/2018    Non-healing wound of left heel 03/14/2018    Orthostatic hypotension 03/16/2018    Diabetic gastroparesis ([facility] [address] ) 03/19/2018    Bilateral lower extremity edema 03/19/2018    Class 2 obesity due to excess calories with serious comorbidity and body mass index (BMI) of 35 0 to 35 9 in adult 03/19/2018    Triple vessel coronary artery disease 03/23/2018    Acute on chronic diastolic heart failure ([address] ) 03/24/2018    Other constipation 03/26/2018    S/P CABG x 3 03/28/2018    Diabetic autonomic neuropathy associated with type 2 diabetes mellitus ([facility] [address] ) 03/29/2018    Hyponatremia 04/02/2018    Encounter for postoperative care 05/08/2018    Diabetic ulcer of left heel associated with type 2 diabetes mellitus, limited to breakdown of skin ([facility] [address] ) 05/10/2018     Resolved Ambulatory Problems     Diagnosis Date Noted    Diabetic peripheral neuropathy ([facility] [address] ) 12/28/2017    Diabetic ulcer of left heel associated with type 2 diabetes mellitus, limited to breakdown of skin ([facility] [address] ) 02/20/2018    Gangrene ([facility] [address] ) 03/14/2018    Dyspnea 03/14/2018    Hyperglycemia 03/14/2018    T wave inversion in EKG 03/14/2018    Atypical chest pain 03/16/2018    Obesity     HTN (hypertension)     Non-ST elevation myocardial infarction (NSTEMI), type 2 03/25/2018    Acute respiratory insufficiency, postoperative 03/28/2018     Past Medical History:   Diagnosis Date    Anemia     Anxiety and depression     Autonomic neuropathy     Cataract     Diabetes mellitus ([address]     Diabetic autonomic neuropathy associated with type 2 diabetes mellitus ([address] )     Gastroparesis due to DM ([facility] [address] )     History of DVT (deep vein thrombosis)     HTN (hypertension)     Hyperlipidemia     Non healing left heel wound     Obesity     Orthostatic hypotension Admitting Diagnosis: Weakness [R53 1]  Healthcare-associated pneumonia [J18 9]  [facility] [I21 4]  Non-healing wound of left heel [S91 302A]    Age/Sex: 61 y o  male    Assessment/Plan:   Principal Problem:    Healthcare-associated pneumonia  Active Problems:    Hyperlipidemia    Vitamin D deficiency    PAD (peripheral artery disease) ([facility]    Elevated troponin    Non-healing wound of left heel    Orthostatic hypotension    Diabetic gastroparesis (HCC)    S/P CABG x 3    Chronic CHF ([facility] [address] )     Plan for the Primary Problem(s):  · pneumonia  ? Admit to med/surg  Started on vanco/cefepime in ED due to recent hospitalization  Will continue     Plan for Additional Problems:   · Hyperlipidemia- continue lipitor  · PAD- was planning vascular procedure but required CABG first  · Elevated troponin- recent CABG 3/28  Trend troponin  Consult cardiology  · Nonhealing wound left heal- consult wound care and podiatry  · Orthostatic hypotension- on florinef and midodrine  · Diabetic gastroparesis- continue reglan  · Chronic CHF- does not appear to be fluid overloaded  Continue regular home dose of lasix     VTE Prophylaxis: Enoxaparin (Lovenox)  / sequential compression device   Code Status: full code  POLST: There is no POLST form on file for this patient (pre-hospital)  Anticipated Length of Stay:  Patient will be admitted on an Inpatient basis with an anticipated length of stay of  Greater than 2 midnights     Justification for Hospital Stay: patient requires IV antibiotics    Admission Orders:    Consult Podiatry  Consult Wound CAre  PT / OT Eval  SCD's  CBC,  BMP  Consult Cardio  LLE Duplex  Repeat trop 0 07  Scheduled Meds:   Current Facility-Administered Medications:          aspirin 325 mg Oral Daily Ramin Garces PA-C    atorvastatin 80 mg Oral Daily With [employer], PA-C    cefepime 2,000 mg Intravenous Q12H Ramin Garces PA-C Last Rate: Stopped (05/24/18 0920)   docusate sodium 100 mg Oral BID Geroldine Pickle, PA-C    enoxaparin 40 mg Subcutaneous Daily Geroldine Pickle, PA-C    ferrous sulfate 325 mg Oral BID With Meals Geroldine Pickle, PA-C    fludrocortisone 0 1 mg Oral Daily Geroldine Pickle, PA-C    furosemide 20 mg Oral Daily Geroldine Pickle, PA-C    insulin glargine 70 Units Subcutaneous Q12H [address] Geroldine Pickle, PA-C    insulin lispro 1-5 Units Subcutaneous HS Geroldine Pickle, PA-C    insulin lispro 2-12 Units Subcutaneous TID AC Geroldine Pickle, PA-C    metoclopramide 5 mg Oral Daily Geroldine Pickle, PA-C    midodrine 10 mg Oral TID With Meals Geroldine Pickle, PA-C                    tamsulosin 0 4 mg Oral Daily With [employer], PA-C    vancomycin 15 mg/kg (Adjusted) Intravenous Q12H Geroldine Pedrole, PA-C Last Rate: 1,500 mg (05/24/18 0939)     Continuous Infusions:    PRN Meds:   acetaminophen    ondansetron    polyethylene glycol    5/24: 98 3 - 68 - 18   145/73   RA 97%  K 3 2,   H&H 10 0 / 31 9  Thank you,  [facility] in the [employer] [facility] [place] for 2017  Network Utilization Review Department  Phone: [phone]; Fax [phone]  ATTENTION: The Network Utilization Review Department is now centralized for our 7 Facilities  Make a note that we have a new phone and fax numbers for our Department  Please call with any questions or concerns to [phone] and carefully follow the prompts so that you are directed to the right person  All voicemails are confidential  Fax any determinations, approvals, denials, and requests for initial or continue stay review clinical to [phone]  Due to HIGH CALL volume, it would be easier if you could please send faxed requests to expedite your requests and in part, help us provide discharge notifications faster

## 2018-05-24 NOTE — OCCUPATIONAL THERAPY NOTE
[address]  Evaluation     Patient Name: Ángel Santos  Today's Date: 5/24/2018  Problem List  Patient Active Problem List   Diagnosis    Anxiety and depression    Benign essential HTN    Diabetic neuropathy, type II diabetes mellitus ([facility] )    Hyperlipidemia    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 (HCC)    Vitamin D deficiency    PAD (peripheral artery disease) ([facility])    Elevated troponin    Non-healing wound of left heel    Orthostatic hypotension    Diabetic gastroparesis (HCC)    Bilateral lower extremity edema    Class 2 obesity due to excess calories with serious comorbidity and body mass index (BMI) of 35 0 to 35 9 in adult    Triple vessel coronary artery disease    Acute on chronic diastolic heart failure (HCC)    Other constipation    S/P CABG x 3    Diabetic autonomic neuropathy associated with type 2 diabetes mellitus ([facility])    Hyponatremia    Encounter for postoperative care    Diabetic ulcer of left heel associated with type 2 diabetes mellitus, limited to breakdown of skin ([facility] )    Healthcare-associated pneumonia    Chronic CHF ([facility] )     Past Medical History  Past Medical History:   Diagnosis Date    Anemia     Anxiety and depression     Autonomic neuropathy     Cataract     Diabetes mellitus ([facility]     Diabetic autonomic neuropathy associated with type 2 diabetes mellitus ([facility] )     Last Assessed: 12/28/2017    Gastroparesis due to DM ([address] )     History of DVT (deep vein thrombosis)     left LE    HTN (hypertension)     Hyperlipidemia     Non healing left heel wound     Obesity     Orthostatic hypotension      Past Surgical History  Past Surgical History:   Procedure Laterality Date    MD CABG, ARTERY-VEIN, FOUR N/A 3/28/2018    Procedure: CORONARY ARTERY BYPASS GRAFT (CABG) x3 VESSELS, LIMA TO LAD, SVG--> PDA, SVG--> OM2,  RIGHT LEG EVH/SVH TO , INTRA-OP AMANDEEP;  Surgeon: Nhan Black MD;  Location: [facility] OR;  Service: Cardiac Surgery    ROTATOR CUFF REPAIR Bilateral            05/24/18 9523   Note Type   Note type Eval/Treat   Restrictions/Precautions   Weight Bearing Precautions Per Order Yes   RLE Weight Bearing Per Order WBAT  (w/ surgical shoe)   LLE Weight Bearing Per Order WBAT  (w/ Darco shoe)   Other Precautions Multiple lines;Telemetry;WBS;Fall Risk;Pain   Pain Assessment   Pain Assessment 0-10   Pain Score 4   Pain Type Chronic pain   Pain Location Neck   Pain Orientation Bilateral   Hospital Pain Intervention(s) Ambulation/increased activity;Repositioned   Response to Interventions tolerated   Home Living   Type of [address] [place] [address] Two level;1/2 bath on main level  (4 VITOR w/ rails; currently 1st floor setup w/ hospital bed)   Bathroom Shower/Tub Tub/shower unit  (upstairs, currently sponge bathing)   Bathroom Toilet Standard   Bathroom Equipment Commode   Bathroom Accessibility Accessible   Home Equipment Walker; Stephen Courtney   Additional Comments pt reports 1st floor setup at home, hospital bed on 1st floor and 1/2 bath    Prior Function   Level of [place] Independent with ADLs and functional mobility; Needs assistance with ADLs and functional mobility  (assist LB ADLs at times, MOD I w/c mobility)   Lives With Spouse   Receives Help From Family   ADL Assistance Independent  (assist LB ADLS at times)   IADLs Needs assistance   Falls in the last 6 months 1 to 4  (1 recent since home and 1 at [facility])   Vocational On disability   Comments 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 transports; pt receiving home health NS for dressing care, pt home alone; wife works 2 jobs   Lifestyle   Autonomy per pt independent w/ ADLS assist LB ADLs at times, independent functional transfers and mobility w/ RW short distance and mainly is using w/c for mobility, light IADLs at w/c level   Reciprocal Relationships spouse   Service to Others retired from [employer] watching tv   ADL   Where Assessed Chair   Eating Assistance 5  Supervision/Setup   Grooming Assistance 4  Minimal Assistance   Grooming Deficit Setup;Verbal cueing;Supervision/safety; Increased time to complete;Standing with assistive device   UB Bathing Assistance 5  Supervision/Setup   LB Bathing Assistance 4  Minimal Assistance   [address] 5  Supervision/Setup   LB Dressing Assistance 3  Moderate [address]  3  Moderate Assistance   Bed Mobility   Sit to Supine 5  Supervision   Additional items Assist x 1; Increased time required;Verbal cues   Additional Comments increased time to complete   Transfers   Sit to Stand 4  Minimal assistance   Additional items Assist x 1; Increased time required;Verbal cues;Armrests   Stand to Sit 4  Minimal assistance   Additional items Assist x 1; Increased time required;Verbal cues;Armrests   Toilet transfer 4  Minimal assistance   Additional items Assist x 1; Increased time required;Standard toilet;Verbal cues  (grab bar use)   Additional Comments VCs for positioning and grab bar use   Functional Mobility   Functional Mobility 4  Minimal assistance   Additional Comments assist x1 w/ RW and cues for safety   Additional items Rolling walker   Balance   Static Sitting Fair   Dynamic Sitting Fair   Static Standing Fair -   Dynamic Standing Poor +   Ambulatory Poor +   Activity Tolerance   Activity Tolerance Patient limited by fatigue;Patient limited by pain;Treatment limited secondary to medical complications (Comment)   Nurse Made Aware appropriate to see per Alissa RICHMOND Assessment   RUE Assessment WFL  (grossly 3+/5, recent CABGx3)   LUE Assessment   LUE Assessment WFL  (grossly 3+/5, recent CABGx3)   Hand Function   Gross Motor Coordination Functional   Fine Motor Coordination Impaired   Sensation   Light Touch Severe deficits in the RUE;Severe deficits in the LUE;Severe deficits in the RLE; Severe deficits in the LLE   Additional Comments numbness in hands Vision-Basic Assessment   Current Vision No visual deficits   Vision - Complex Assessment   Ocular Range of Motion WFL   Acuity Able to read clock/calendar on wall without difficulty   Perception   Inattention/Neglect Appears intact   Cognition   Overall Cognitive Status [facility]   Arousal/Participation Cooperative; Alert   Attention Within functional limits   Orientation Level Oriented to person;Oriented to place;Oriented to time;Oriented to situation   Memory Decreased short term memory   Following Commands Follows one step commands without difficulty   Comments pt engages in appropriate conversations, pt w/ impaired safety awareness and insight   Assessment   Limitation Decreased ADL status; Decreased UE strength;Decreased Safe judgement during ADL;Decreased cognition;Decreased endurance;Decreased self-care trans;Decreased high-level ADLs  (WBAT L LE w/ darco wedge shoe)   Prognosis Good   Assessment Pt is a 61 y o  male seen for OT evaluation s/p admit to [facility] on 5/23/2018 w/ Healthcare-associated pneumonia and weakness  Comorbidities affecting pt's functional performance at time of assessment include: PAD, elevated troponin, CABG x3 3/28/18, orthostatic hypotension, CHF, nonhealing L heal  Per podiatry note pt WBAT L LE w/ darco wedge shoe and R LE w/ surgical shoe  Personal factors affecting pt at time of IE include: increased pain in neck, impaired safety awareness  Prior to admission, pt was living w/ spouse and reports alone during day  Pt spouse works 2 jobs one at [employer] and a job at late afternoon, early evenings  Pt reports independent UB ADLs, assist LB ADLS at times, reports mainly use of w/c for mobility, transfers and toileting MOD I, light meal prep at w/c level during day, wife completes heavier IADLs   Upon evaluation: Pt requires MIN assist sit<>stand w/ VCs for positioing and safety and cues to bring walker all the way back w/ him, MOD assist LB ADLS, MOD assist toielting, MIN assist steadying at sink, supervision bed mobility 2* the following deficits impacting occupational performance: increased pain, impaired balance, WB restrictions w/ darco wedge shoe L LE, impaired functional reach, decreased strength and endurance, impaired activity tolerance  Pt to benefit from continued skilled OT tx while in the hospital to address deficits as defined above and maximize level of functional independence w ADL's and functional mobility  Occupational Performance areas to address include: grooming, bathing/shower, toilet hygiene, dressing, functional mobility and clothing management, home safety education  From OT standpoint, recommendation at time of d/c would be home w/ family support; pt declines home therapies as can't afford copays  Goals   Patient Goals "go home"   LTG Time Frame 7-10   Long Term Goal please see below goals   Plan   Treatment Interventions ADL retraining;Functional transfer training;UE strengthening/ROM; Endurance training;Cognitive reorientation;Patient/family training;Equipment evaluation/education; Compensatory technique education; Energy conservation; Activityengagement   Goal Expiration Date 06/03/18   OT Frequency 3-5x/wk   Recommendation   OT Discharge Recommendation Home with family support   OT - OK to Discharge (when medically stable)   Barthel Index   Feeding 10   Bathing 0   Grooming Score 5   Dressing Score 5   Bladder Score 10   Bowels Score 10   Toilet Use Score 5   Transfers (Bed/Chair) Score 10   Mobility (Level Surface) Score 0   Stairs Score 0   Barthel Index Score 55   Modified Garza Scale   Modified Garza Scale 4     Occupational Therapy Goals to be met in 7-10 days:  1) Pt will improve activity tolerance to G for min 30 min txment sessions  2) Pt will complete ADLs/self care w/ mod I   3) Pt will complete toileting w/ mod I w/ G hygiene/thoroughness using DME PRN  4) Pt will improve functional transfers on/off all surfaces using DME PRN w/ G balance/safety including toileting w/ mod I  5) Pt will improve fx'l mobility during I/ADl/leisure tasks using DME PRN w/ g balance/safety w/ mod I  6) Pt will engage in ongoing cognitive assessment w/ G participation to A w/ safe d/c planning/recommendations  7) Pt will demonstrate G carryover of pt/caregiver education and training as appropriate w/ mod I  w/ G tolerance  8) Pt will engage in depression screen/leisure interest checklist w/ G participation to monitor s/s depression and ID 3 positive coping strategies to A w/ emotional regulation and management  9) Pt will demonstrate 100% carryover of E C  techniques w/ mod I t/o fx'l I/ADL/leisure tasks w/o cues s/p skilled education  10) Pt will demonstrate 100% carryover of LHAE for LB ADLs/self care and leisure s/p skilled education w/ mod I and G participation  11) Pt will demonstrate improved standing tolerance to 3-5 minutes during functional tasks w/ no LOB to enhance ADL performance  12) Pt will demonstrate improved b/l UE strength by 1 MMT grade to enhance ADLS and functional transfers    Documentation completed by: Meredith Toledo, MS, OTR/L

## 2018-05-24 NOTE — OCCUPATIONAL THERAPY NOTE
Occupational Therapy Cancellation Note        OT consult received  Podiatry consulted and will await for recommendations on WBS prior to OT evaluation      Gauri Carcamo MS, OTR/L

## 2018-05-24 NOTE — CONSULTS
Consult - Cardiology   Yvonne Aguilar 61 y o  male MRN: [id number]  Unit/Bed#: E4 -01 Encounter: [id number]        Reason For Consult:  Abnormal troponin level               Assessment and Plan:     1  Febrile illness with probable pneumonia (healthcare associated) Primary Dx and reason for admit:  Antibiotics ongoing with management per admitting service  2  Persistent left heel wound with left lower extremity popliteal artery occlusion:  Previous unsuccessful attempt at angioplasty  Recently seen by vascular surgery with plans for repeat angiogram and attempt at angioplasty - presumably next week  If unsuccessful bypass was recommended  3  CAD with recent CABG x4, 3/28/2018: Recently seen by CT surgery on 5/10/2018 at which point he was thought to be doing well  He remained stable at this time  4   Elevated troponin:  Currently with mild elevation with no dynamic change  This is nonspecific and not indicative of any acute coronary syndrome or non STEMI  No further DX/Tx is planned  5  Hypertension: Controlled  6  Dyslipidemia: Remains on statin      History Of Present Illness: This gentleman is a 54-year-old with a history of diabetes mellitus with neuropathy and retinopathy, probable gastroparesis, hypertension, orthostatic hypotension, dyslipidemia, prior tobacco abuse and CAD with recent CABG  In March of this year he was hospitalized at this campus for worsening of a longstanding left heel wound  He had had some atypical chest pains followed by an echocardiogram with wall motion abnormalities and elevation of his troponin leading to a stress imaging examination with a large moderately severe and partially reversible perfusion defect of the inferolateral wall  He went on to have a catheterization with delineation of triple-vessel disease for which she was transferred to the [facility] to undergo CABG which was completed on 3/28/2018    He was ultimately discharged on 04/04 to rehab facility  He currently reports that he has been back in his own home approximately 2 weeks  Though is rehabilitation/ambulation remains impeded by left lower extremity wound and weight-bearing limitations he otherwise is content with his recovery  He denies any recent chest pain or dyspnea  During the last 2-3 days the patient has had some mild fatigue with development of a cough  Yesterday he had had experience some chills and sweats  He had taken his temperature at home noting that it was approximately 101  For these reasons he came to the emergency department  Here he has had a T-max of 99 5° without leukocytosis  Chest x-ray on arrival reported left post oral basal consolidation possibly representing pneumonia  X-ray of his foot did not show signs of osteomyelitis  With no report of chest pain cardiac biomarkers were obtained with troponin levels of 0 06, 0 07, 0 07  Because of these our consultation was requested        Past Medical History:        Past Medical History:   Diagnosis Date    Anemia     Anxiety and depression     Autonomic neuropathy     Cataract     Diabetes mellitus ([facility] )     Diabetic autonomic neuropathy associated with type 2 diabetes mellitus (HCC)     Last Assessed: 12/28/2017    [facility] ([facility])     History of DVT (deep vein thrombosis)     left LE    HTN (hypertension)     Hyperlipidemia     Non healing left heel wound     Obesity     Orthostatic hypotension     Past Surgical History:   Procedure Laterality Date    MI CABG, ARTERY-VEIN, FOUR N/A 3/28/2018    Procedure: CORONARY ARTERY BYPASS GRAFT (CABG) x3 VESSELS, LIMA TO LAD, SVG--> PDA, SVG--> OM2,  RIGHT LEG EVH/SVH TO , INTRA-OP AMANDEEP;  Surgeon: Carson Garcia MD;  [facility];  Service: Cardiac Surgery    ROTATOR CUFF REPAIR Bilateral         Allergy:        Allergies   Allergen Reactions    Metformin        Medications:       Prior to Admission medications    Medication Sig Start Date End Date Taking?  Authorizing Provider   aspirin 325 mg tablet Take 1 tablet (325 mg total) by mouth daily 4/5/18  Yes Shay Davis PA-C   atorvastatin (LIPITOR) 80 mg tablet Take 1 tablet (80 mg total) by mouth daily with dinner 4/4/18  Yes Shay Davis PA-C   docusate sodium (COLACE) 100 mg capsule Take 1 capsule (100 mg total) by mouth 2 (two) times a day 4/4/18  Yes Shay Davis PA-C   ferrous sulfate 220 (44 Fe) mg/5 mL solution Take 220 mg by mouth daily   Yes Historical Provider, MD   ferrous sulfate 325 (65 Fe) mg tablet Take 325 mg by mouth 2 (two) times a day with meals   Yes Historical Provider, MD   fludrocortisone (FLORINEF) 0 1 mg tablet Take 0 1 mg by mouth daily   Yes Historical Provider, MD   furosemide (LASIX) 20 mg tablet Take 20 mg by mouth daily   Yes Historical Provider, MD   insulin aspart (NovoLOG) 100 units/mL injection Inject 10 Units under the skin 3 (three) times a day before meals   Yes Historical Provider, MD   insulin detemir (LEVEMIR) 100 units/mL subcutaneous injection Inject 20 Units under the skin daily at bedtime   Yes Historical Provider, MD   lactulose (CEPHULAC) 10 g packet Take 10 g by mouth daily as needed   Yes Historical Provider, MD   metoclopramide (REGLAN) 5 mg tablet Take 5 mg by mouth daily   Yes Historical Provider, MD   midodrine (PROAMATINE) 10 MG tablet Take 1 tablet (10 mg total) by mouth 3 (three) times a day with meals 4/4/18  Yes Shay Davis PA-C   ondansetron (ZOFRAN-ODT) 4 mg disintegrating tablet Take 4 mg by mouth every 8 (eight) hours as needed for nausea or vomiting   Yes Historical Provider, MD   pantoprazole (PROTONIX) 40 mg tablet Take 40 mg by mouth daily   Yes Historical Provider, MD   polyethylene glycol (MIRALAX) 17 g packet Take 17 g by mouth daily 4/5/18  Yes Shay Davis PA-C   tamsulosin (FLOMAX) 0 4 mg Take 1 capsule (0 4 mg total) by mouth daily with dinner 4/4/18  Yes NUNO Henning History:     Family History   Problem Relation Age of Onset    Atrial fibrillation Mother    Shagufta Jones Stroke Mother     Hypertension Father     Heart attack Paternal Grandfather 61        Social History:       Social History     Social History    Marital status: /Civil Union     Spouse name: N/A    Number of children: N/A    Years of education: N/A     Social History Main Topics    Smoking status: Former Smoker     Packs/day: 1 00     Years: 12 00     Types: Cigarettes     Quit date: 3/23/1994    Smokeless tobacco: Never Used    Alcohol use No    Drug use: No    Sexual activity: Not Asked     Other Topics Concern    None     Social History Narrative    None       ROS:  Symptoms per HPI  Persistent left heel wound with modest weight-bearing of left foot  Operative wounds healing well  Chronic lower extremity dysesthesias      Exam:  General:  Alert, cooperative, normally conversant and comfortable appearing  Head: Normocephalic, atraumatic  Eyes:  EOMI  Pupils - equal, round, reactive to accomodation  No icterus  Normal Conjunctiva  Oropharynx:  Moist without lesion  Neck:  No JVD  Heart:  Regular with controlled rate  No gross murmur  Respiratory effort / Chest Inspection:  Scar of sternotomy well approximated with no drainage and nicely healing  No sternal subluxation   Lungs:  Some mild decrease of breath sounds at the bases left greater than right base   Mild coarseness to left basilar breath sounds   Abdomen:   Soft and nontender with no organomegaly or mass  Lower Limbs:  Right lower extremity with only trivial edema  At least 1+ on the left side  Musculoskeletal: Independent movement of limbs observed, Formal ROM and strength eval not performed  Neurologic:    Oriented to: person , place, situation  Cranial Nerves: grossly intact - vision, smell, taste, and hearing  were not tested       Motor function: grossly normal, symmetric   Sensation: Was not tested    DATA:      ECG: 23-MAY-2018 17:53:26 [facility]  Normal sinus rhythm  Nonspecific T wave abnormality  Abnormal ECG  When compared with ECG of 29-MAR-2018 04:33,  hr is 29 bpm more  Nonspecific ST and T wave abnormality  are less pronounced  Confirmed by Deyanne Hashimoto, M D , Holly Malone ([id number]) on 5/24/2018 5:57:38 AM                  Weights: Wt Readings from Last 3 Encounters:   05/23/18 116 kg (256 lb 6 3 oz)   05/10/18 118 kg (260 lb)   04/04/18 113 kg (249 lb 1 9 oz)   , Body mass index is 33 83 kg/m²           Lab Studies:      Results from last 7 days  Lab Units 05/24/18  0252 05/23/18  2204 05/23/18  1840   TROPONIN I ng/mL 0 07* 0 07* 0 06*            Results from last 7 days  Lab Units 05/24/18  0541 05/23/18  1757 05/22/18  0940   WBC Thousand/uL 8 41 8 40 9 40   HEMOGLOBIN g/dL 10 0* 11 1* 11 2*   HEMATOCRIT % 31 9* 35 4* 35 5*   PLATELETS Thousands/uL 242 270 294   ,   Results from last 7 days  Lab Units 05/24/18  0541 05/23/18  1757 05/22/18  0940   SODIUM mmol/L 139 134* 138   POTASSIUM mmol/L 3 2* 3 8 3 9   CHLORIDE mmol/L 101 98* 101   CO2 mmol/L 27 29 28   BUN mg/dL 12 13 13   CREATININE mg/dL 0 76 0 90 0 81   CALCIUM mg/dL 8 2* 8 8 8 7   TOTAL PROTEIN g/dL  --  8 3*  --    BILIRUBIN TOTAL mg/dL  --  0 39  --    ALK PHOS U/L  --  60  --    ALT U/L  --  23  --    AST U/L  --  29  --    GLUCOSE RANDOM mg/dL 69 142* 160*

## 2018-05-24 NOTE — PHYSICAL THERAPY NOTE
PHYSICAL THERAPY NOTE          Patient Name: Magui CHINO Date: 5/24/2018     Podiatry consulted    Will await recommendations for activity restrictions and WBS prior to initiating PT eval Donice Oppenheim, PT

## 2018-05-24 NOTE — PLAN OF CARE
Problem: OCCUPATIONAL THERAPY ADULT  Goal: Performs self-care activities at highest level of function for planned discharge setting  See evaluation for individualized goals  Treatment Interventions: ADL retraining, Functional transfer training, UE strengthening/ROM, Endurance training, Cognitive reorientation, Patient/family training, Equipment evaluation/education, Compensatory technique education, Energy conservation, Activityengagement          See flowsheet documentation for full assessment, interventions and recommendations  Limitation: Decreased ADL status, Decreased UE strength, Decreased Safe judgement during ADL, Decreased cognition, Decreased endurance, Decreased self-care trans, Decreased high-level ADLs (WBAT L LE w/ darco wedge shoe)  Prognosis: Good  Assessment: Pt is a 61 y o  male seen for OT evaluation s/p admit to [facility]  on 5/23/2018 w/ Healthcare-associated pneumonia and weakness  Comorbidities affecting pt's functional performance at time of assessment include: PAD, elevated troponin, CABG x3 3/28/18, orthostatic hypotension, CHF, nonhealing L heal  Per podiatry note pt WBAT L LE w/ darco wedge shoe and R LE w/ surgical shoe  Personal factors affecting pt at time of IE include: increased pain in neck, impaired safety awareness  Prior to admission, pt was living w/ spouse and reports alone during day  Pt spouse works 2 jobs one at [employer] and a job at late afternoon, early evenings  Pt reports independent UB ADLs, assist LB ADLS at times, reports mainly use of w/c for mobility, transfers and toileting MOD I, light meal prep at w/c level during day, wife completes heavier IADLs   Upon evaluation: Pt requires MIN assist sit<>stand w/ VCs for positioing and safety and cues to bring walker all the way back w/ him, MOD assist LB ADLS, MOD assist toielting, MIN assist steadying at sink, supervision bed mobility 2* the following deficits impacting occupational performance: increased pain, impaired balance, WB restrictions w/ darco wedge shoe L LE, impaired functional reach, decreased strength and endurance, impaired activity tolerance  Pt to benefit from continued skilled OT tx while in the hospital to address deficits as defined above and maximize level of functional independence w ADL's and functional mobility  Occupational Performance areas to address include: grooming, bathing/shower, toilet hygiene, dressing, functional mobility and clothing management, home safety education  From OT standpoint, recommendation at time of d/c would be home w/ family support; pt declines home therapies as can't afford copays       OT Discharge Recommendation: Home with family support  OT - OK to Discharge:  (when medically stable)      Comments: Lorenza Aguilera MS, OTR/L

## 2018-05-24 NOTE — CONSULTS
Consultation - Gonzalez Escamilla 61 y o  male MRN: [id number]  Unit/Bed#: E4 -01 Encounter: [id number]      Assessment/Plan     Assessment:  1  A 61year old male with Friend 2 ulceration to left plantar heel complicated by #2-6  2  DM, type 2 with neuropathy: A1c = 9 4% (03/14/2018)   4  PAD hx of unsuccessful left popliteal recanalization (DOS: 03/19/2018) - per vascular     Plan:  - wound evaluated at bedside without acute signs of infection, as patient is pending vascular intervention podiatry to continue with [facility] at this time with betadine wet - dry dressing changes and monitor wound closely  - spoke with Iman from vascular personally and she says for the time being to keep the appointment on 05/29 he has for the repeat arteriogram with attempt at recanalization of left popliteal artery occlusion, however, if not discharged by end of the weekend, will attempt to schedule agram inhouse with surgeon; pending recommendations after intervention possible wound debridement to be performed   - LEADs ordered per SLIM; follow up results   - xrays reviewed: soft tissue swelling, lucency to heel where ulceration is present; no OM noted at this time   - WBS: WBAT in [facility] shoe to left, SS to right; PT/OT onboard     > medical management per primary     Thank you for consultation  Will discuss plan with attending  Podiatry to follow along while in house  History of Present Illness   Physician Requesting Consult: Mark Toussaint DO  Reason for Consult / Principal Problem: left heel ulceration   Hx and PE limited by: N/A  HPI: Radha Maldonado is a 61y o  year old male who presents with left plantar heel ulceration  He states that it started about 3 months ago  He was seen by podiatry during his last admission here in March  He was also seen by vascular who was to perform bypass as canalization of occluded popliteal artery was unsuccessful   However, patient suffered heart complications and needed surgery, and therefore vascular and foot surgery was delayed  He was discharged to [facility], and received [address] there  He finally returned to see Dr Randy Gray at the [address] on Tuesday (5/22)  He is scheduled for a repeat agram with IR on 05/29, if unsuccessful, Dr Sage White to proceed with left SFA to below-knee pop bypass  He states that he has been using a wheelchair to limit as much pressure as possible on the wound, but does admit to using walker at times  He currently has a surgical shoe on the right and DARCO wedge shoe on the left for ambulation  Patient is currently admitted to the hospital for pneumonia and reports feeling tired  Patient is a diabetic male who manages his blood sugar with insulin  He denies any further pedal concerns or consitutional symptoms  Inpatient consult to Podiatry     Performed by  Hector Mejia by Denny Malave       Consent: Verbal consent obtained  Inpatient consult to Podiatry     Performed by  eHctor Mejia by Artur Rowell              Review of Systems   Constitutional: Positive for fatigue  Skin: Positive for wound  Neurological: Positive for numbness         Historical Information   Past Medical History:   Diagnosis Date    Anemia     Anxiety and depression     Autonomic neuropathy     Cataract     Diabetes mellitus ([facility] )     Diabetic autonomic neuropathy associated with type 2 diabetes mellitus (HCC)     Last Assessed: 12/28/2017    Gastroparesis due to DM ([facility] )     History of DVT (deep vein thrombosis)     left LE    HTN (hypertension)     Hyperlipidemia     Non healing left heel wound     Obesity     Orthostatic hypotension      Past Surgical History:   Procedure Laterality Date    SC CABG, ARTERY-VEIN, FOUR N/A 3/28/2018    Procedure: CORONARY ARTERY BYPASS GRAFT (CABG) x3 VESSELS, LIMA TO LAD, SVG--> PDA, SVG--> OM2,  RIGHT LEG EVH/SVH TO , INTRA-OP AMANDEEP;  Surgeon: Eddie Huang MD; [facility];  Service: Cardiac Surgery    ROTATOR CUFF REPAIR Bilateral      Social History   History   Alcohol Use No     History   Drug Use No     History   Smoking Status    Former Smoker    Packs/day: 1 00    Years: 12 00    Types: Cigarettes    Quit date: 3/23/1994   Smokeless Tobacco    Never Used     Family History:   Family History   Problem Relation Age of Onset    Atrial fibrillation Mother     Stroke Mother     Hypertension Father     Heart attack Paternal Grandfather 61       Meds/Allergies   all current active meds have been reviewed    Allergies   Allergen Reactions    Metformin        Objective       Intake/Output Summary (Last 24 hours) at 05/24/18 1329  Last data filed at 05/24/18 0853   Gross per 24 hour   Intake             1360 ml   Output             1640 ml   Net             -280 ml           Physical Exam   Constitutional: He is oriented to person, place, and time  He appears well-developed and well-nourished  HENT:   Head: Normocephalic and atraumatic  Eyes: EOM are normal  Pupils are equal, round, and reactive to light  Neck: Normal range of motion  Cardiovascular: Normal rate, regular rhythm and normal heart sounds  DP/PT pulses non-palpable however patent dopplerable signals, cap refill <3 secs, no pedal hairs, skin temp warm to warm from toes to tibial tuberosity, no putting edema   Pulmonary/Chest: No respiratory distress  Abdominal: Soft  Bowel sounds are normal  He exhibits no distension  There is no tenderness  Musculoskeletal: He exhibits no tenderness  motor function intact, no calf tenderness, MMT +5/5 in all compartments, ambulatory with walker   Neurological: He is alert and oriented to person, place, and time  gross sensation absent B/L; epicritic sensation absent B/L   Skin: Skin is warm  Left plantar heel ulcerations: measuring 5 2x5  2x0 5cm; mixture of fibrotic and necrotic wound base, macerated wound edge, no probe to muscle tendon or bone, mild malodor, no active drainage, no purulence, no fluctuance, no crepitus, no erythema or ascending cellulitis, no acute signs of infection     No open lesions to right foot, no ID macerations    Psychiatric: He has a normal mood and affect  His behavior is normal  Judgment and thought content normal            Lab Results:   I have personally reviewed pertinent labs  CBC:   Lab Results   Component Value Date    WBC 8 41 05/24/2018    RBC 3 82 (L) 05/24/2018     CMP:   Lab Results   Component Value Date     05/24/2018     05/24/2018    CO2 27 05/24/2018    ANIONGAP 11 05/24/2018    BUN 12 05/24/2018    CREATININE 0 76 05/24/2018    GLUCOSE 69 05/24/2018    CALCIUM 8 2 (L) 05/24/2018    AST 29 05/23/2018    ALT 23 05/23/2018    ALKPHOS 60 05/23/2018    PROT 8 3 (H) 05/23/2018    BILITOT 0 39 05/23/2018    EGFR 101 05/24/2018     Lab Results   Component Value Date    HGBA1C 9 4 (H) 03/14/2018     Imaging Studies: I have personally reviewed pertinent reports  EKG, Pathology, and Other Studies: I have personally reviewed pertinent reports

## 2018-05-25 ENCOUNTER — TELEPHONE (OUTPATIENT)
Dept: VASCULAR SURGERY | Facility: CLINIC | Age: 58
End: 2018-05-25

## 2018-05-25 LAB
ANION GAP SERPL CALCULATED.3IONS-SCNC: 8 MMOL/L (ref 4–13)
BUN SERPL-MCNC: 12 MG/DL (ref 5–25)
CALCIUM SERPL-MCNC: 8.6 MG/DL (ref 8.3–10.1)
CHLORIDE SERPL-SCNC: 103 MMOL/L (ref 100–108)
CO2 SERPL-SCNC: 28 MMOL/L (ref 21–32)
CREAT SERPL-MCNC: 0.8 MG/DL (ref 0.6–1.3)
GFR SERPL CREATININE-BSD FRML MDRD: 98 ML/MIN/1.73SQ M
GLUCOSE SERPL-MCNC: 127 MG/DL (ref 65–140)
GLUCOSE SERPL-MCNC: 140 MG/DL (ref 65–140)
GLUCOSE SERPL-MCNC: 167 MG/DL (ref 65–140)
GLUCOSE SERPL-MCNC: 89 MG/DL (ref 65–140)
GLUCOSE SERPL-MCNC: 89 MG/DL (ref 65–140)
POTASSIUM SERPL-SCNC: 3.5 MMOL/L (ref 3.5–5.3)
PROCALCITONIN SERPL-MCNC: 0.06 NG/ML
SODIUM SERPL-SCNC: 139 MMOL/L (ref 136–145)

## 2018-05-25 PROCEDURE — G8978 MOBILITY CURRENT STATUS: HCPCS

## 2018-05-25 PROCEDURE — 80048 BASIC METABOLIC PNL TOTAL CA: CPT | Performed by: INTERNAL MEDICINE

## 2018-05-25 PROCEDURE — 82948 REAGENT STRIP/BLOOD GLUCOSE: CPT

## 2018-05-25 PROCEDURE — 97163 PT EVAL HIGH COMPLEX 45 MIN: CPT

## 2018-05-25 PROCEDURE — G8979 MOBILITY GOAL STATUS: HCPCS

## 2018-05-25 PROCEDURE — 99232 SBSQ HOSP IP/OBS MODERATE 35: CPT | Performed by: INTERNAL MEDICINE

## 2018-05-25 PROCEDURE — 99254 IP/OBS CNSLTJ NEW/EST MOD 60: CPT | Performed by: INTERNAL MEDICINE

## 2018-05-25 PROCEDURE — 84145 PROCALCITONIN (PCT): CPT | Performed by: INTERNAL MEDICINE

## 2018-05-25 PROCEDURE — 87633 RESP VIRUS 12-25 TARGETS: CPT | Performed by: INTERNAL MEDICINE

## 2018-05-25 PROCEDURE — 87081 CULTURE SCREEN ONLY: CPT | Performed by: INTERNAL MEDICINE

## 2018-05-25 RX ADMIN — ACETAMINOPHEN 650 MG: 325 TABLET ORAL at 00:22

## 2018-05-25 RX ADMIN — ENOXAPARIN SODIUM 40 MG: 40 INJECTION SUBCUTANEOUS at 08:59

## 2018-05-25 RX ADMIN — INSULIN LISPRO 2 UNITS: 100 INJECTION, SOLUTION INTRAVENOUS; SUBCUTANEOUS at 17:27

## 2018-05-25 RX ADMIN — METOCLOPRAMIDE HYDROCHLORIDE 5 MG: 10 TABLET ORAL at 09:00

## 2018-05-25 RX ADMIN — ASPIRIN 325 MG: 325 TABLET ORAL at 08:58

## 2018-05-25 RX ADMIN — VANCOMYCIN HYDROCHLORIDE 1500 MG: 1 INJECTION, POWDER, LYOPHILIZED, FOR SOLUTION INTRAVENOUS at 09:50

## 2018-05-25 RX ADMIN — DOCUSATE SODIUM 100 MG: 100 CAPSULE, LIQUID FILLED ORAL at 08:58

## 2018-05-25 RX ADMIN — FERROUS SULFATE TAB 325 MG (65 MG ELEMENTAL FE) 325 MG: 325 (65 FE) TAB at 17:26

## 2018-05-25 RX ADMIN — FERROUS SULFATE TAB 325 MG (65 MG ELEMENTAL FE) 325 MG: 325 (65 FE) TAB at 08:58

## 2018-05-25 RX ADMIN — ACETAMINOPHEN 650 MG: 325 TABLET ORAL at 22:22

## 2018-05-25 RX ADMIN — MIDODRINE HYDROCHLORIDE 10 MG: 5 TABLET ORAL at 17:26

## 2018-05-25 RX ADMIN — CEFEPIME HYDROCHLORIDE 2000 MG: 2 INJECTION, POWDER, FOR SOLUTION INTRAVENOUS at 08:59

## 2018-05-25 RX ADMIN — FLUDROCORTISONE ACETATE 0.1 MG: 0.1 TABLET ORAL at 08:59

## 2018-05-25 RX ADMIN — DOCUSATE SODIUM 100 MG: 100 CAPSULE, LIQUID FILLED ORAL at 17:26

## 2018-05-25 RX ADMIN — TAMSULOSIN HYDROCHLORIDE 0.4 MG: 0.4 CAPSULE ORAL at 17:26

## 2018-05-25 RX ADMIN — INSULIN GLARGINE 70 UNITS: 100 INJECTION, SOLUTION SUBCUTANEOUS at 09:06

## 2018-05-25 RX ADMIN — INSULIN GLARGINE 40 UNITS: 100 INJECTION, SOLUTION SUBCUTANEOUS at 22:14

## 2018-05-25 RX ADMIN — ATORVASTATIN CALCIUM 80 MG: 80 TABLET, FILM COATED ORAL at 17:26

## 2018-05-25 RX ADMIN — MIDODRINE HYDROCHLORIDE 10 MG: 5 TABLET ORAL at 08:59

## 2018-05-25 RX ADMIN — FUROSEMIDE 20 MG: 20 TABLET ORAL at 08:58

## 2018-05-25 RX ADMIN — MIDODRINE HYDROCHLORIDE 10 MG: 5 TABLET ORAL at 12:07

## 2018-05-25 NOTE — PHYSICAL THERAPY NOTE
PT EVALUATION    61 y o     [id number]    Weakness [R53 1]  Healthcare-associated pneumonia [J18 9]  NSTEMI (non-ST elevated myocardial infarction) ([facility] ) [I21 4]  Non-healing wound of left heel [S91 302A]    Past Medical History:   Diagnosis Date    Anemia     Anxiety and depression     Autonomic neuropathy     Cataract     Diabetes mellitus ([facility] )     Diabetic autonomic neuropathy associated with type 2 diabetes mellitus ([place] [address] )     Last Assessed: 12/28/2017    Gastroparesis due to DM ([place] [address] )     History of DVT (deep vein thrombosis)     left LE    HTN (hypertension)     Hyperlipidemia     Non healing left heel wound     Obesity     Orthostatic hypotension          Past Surgical History:   Procedure Laterality Date    [place] CABG, ARTERY-VEIN, FOUR N/A 3/28/2018    Procedure: CORONARY ARTERY BYPASS GRAFT (CABG) x3 VESSELS, LIMA TO LAD, SVG--> PDA, SVG--> OM2,  RIGHT LEG EVH/SVH TO , INTRA-OP AMANDEEP;  Surgeon: Chang Moreira MD;  [facility];  Service: Cardiac Surgery    ROTATOR CUFF REPAIR Bilateral       05/25/18 1135   Note Type   Note type Eval/Treat   Pain Assessment   Pain Assessment No/denies pain   Home Living   Type of [address] Two level;1/2 bath on main level;Stairs to enter with rails  (4 [address]IT[address] with rail, 1st floor set up with hosp bed )   Bathroom Shower/Tub Tub/shower unit   Bathroom Toilet Standard   Bathroom Equipment Commode   [facility]; Wheelchair-manual   Additional Comments Reports 1st floor set up at home  Hospital bed 1st floor with 1/2 bath  Notes has WC that he can use as primary mode of locomotion on main level  Only few steps into bathroom  Was wearing darco on L an surgical shoe on R( for protective purposes)   Prior Function   Level of [place] Independent with ADLs and functional mobility; Needs assistance with ADLs and functional mobility  (A for LB ADLS at all times )   Lives With [place] Help From Family   ADL Assistance Independent  (A for lower body)   IADLs Needs assistance   Falls in the last 6 months 1 to 4  (2  One at [facility] and one at home )   Vocational On disability   Comments Pt home alone for long periods  Wife works 2 jobs  Gets nursing home health  Restrictions/Precautions   RLE Weight Bearing Per Order WBAT  (surgical shoe)   LLE Weight Bearing Per Order WBAT  (with darco wedge shoe per podiatry notes)   Other Precautions Multiple lines;Telemetry; Fall Risk;Pain;Contact/isolation  (r/o MRSA)   General   Additional Pertinent History Pt is 60 y/o male admitted with PNA  REcent CABG  Ongoing L heel wound management with podiatry  Per podiatry consult  WBAT with darco with L foot, WBAT surgical shoe R   PT consulted  Up with assist    Family/Caregiver Present No   Cognition   Overall Cognitive Status WFL   Orientation Level Oriented X4   RUE Assessment   RUE Assessment WFL  (groslsy 3+/5)   LUE Assessment   LUE Assessment WFL  (grossly 3+/5)   RLE Assessment   RLE Assessment X   Strength RLE   R Knee Extension 4-/5   R Ankle Dorsiflexion 1/5   R Ankle Plantar Flexion 2-/5   R Hip Flexion 4-/5   LLE Assessment   LLE Assessment X   Strength LLE   L Hip Flexion 4-/5   L Ankle Dorsiflexion 1/5   L Ankle Plantar Flexion 2-/5   L Knee Extension 4-/5   Coordination   Movements are Fluid and Coordinated 0   Coordination and Movement Description given severe nueropathy LE   Light Touch   RLE Light Touch Absent   LLE Light Touch Absent   Bed Mobility   Supine to Sit 5  Supervision   Additional items Increased time required; Bedrails;HOB elevated   Additional Comments Increasd time to complete transitions  Transfers   Sit to Stand 4  Minimal assistance   Additional items Assist x 1; Increased time required;Verbal cues   Stand to Sit 4  Minimal assistance   Additional items Assist x 1; Increased time required;Verbal cues;Armrests   Additional Comments VC for hand placement   Increased time to complete transitions  Ambulation/Elevation   Gait pattern Decreased foot clearance; Improper Weight shift; Excessively slow; Steppage; Short stride; Inconsistent gerard;Decreased L stance   Gait Assistance 4  Minimal assist   Additional items Assist x 1;Verbal cues; Tactile cues   Assistive Device Rolling walker  (surg shoe R, darco wedge L)   Distance 8'x1   Balance   Static Sitting Fair +   Dynamic Sitting Fair   Static Standing Fair -   Dynamic Standing Poor +   Ambulatory Poor +   Endurance Deficit   Endurance Deficit Yes   Endurance Deficit Description fatigue, weakness, dizziness  Activity Tolerance   Activity Tolerance Patient limited by fatigue;Treatment limited secondary to medical complications (Comment)  (dizziness)   Medical Staff Made Aware NurseDu  PCA Severino Palacio   Nurse Made Aware ok to see for PT  Updated on PT eval p session  Assessment   Prognosis Fair   Problem List Decreased strength;Decreased range of motion;Decreased endurance; Impaired balance;Decreased mobility; Decreased safety awareness; Impaired sensation;Decreased skin integrity;Orthopedic restrictions;Pain   Assessment Pt is 61 y o  male seen for PT evaluation s/p admit to [address] [address] on 5/23/2018  Two pt identifiers were used to confirm  Pt presented w/ weakness  Pt was admitted with a primary dx of: pnuemonia  PT now consulted for assessment of mobility and d/c needs  Pt with Up with assistance orders  Podiatry consult reviewed and WBAT in [address] on L, R foot in surgical shoe  Pts personal factors affecting treatment include: co morbidities, recent CABG, ongoing L foot wound management, hx of recent rhb, fall risk, VITOR home, multiple lines   Pts current clinical presentation is Unstable/ Unpredictable (high complexity) due to Ongoing medical management for primary dx, Increased reliance on more restrictive AD compared to baseline, Decreased activity tolerance compared to baseline, Fall risk, Increased assistance needed from caregiver at current time, Current WBS, Trending lab values    Prior to admission, pt was ambulating very short distances with RW, primarily using WC on main level of home given L foot wound and attempt to offload as much as possible  Upon evaluation, pt currently is requiring Joan for bed mobility; Joan for transfers and Joan for ambulation w/ RW   Ambulation limited and cues for RW use  Pt presents at PT eval functioning below baseline and currently w/ overall mobility deficits 2* to: BLE weakness, decreased ROM, impaired balance, decreased endurance, gait deviations, pain, decreased activity tolerance compared to baseline, decreased functional mobility tolerance compared to baseline, altered sensation, decreased safety awareness, fall risk, decreased skin integrity  Pt currently at a fall risk 2* to impairments listed above  Pt will continue to benefit from skilled PT interventions to address stated impairments; to maximize functional mobility; for ongoing pt/ family training; and DME needs  At conclusion of PT session pt returned back in chair, all needs in reach and RN notified of session findings/recommendations with phone and call bell within reach  Pt denies any further questions at this time  PT is currently recommending rhb v hhpt pending progress and abiltiy to achieve indpendence with mobiltiy as alone for long periods of day  Pt agreeable to IPPT and progression of mobility while hospitalized  PT will continue to follow during hospital stay  Barriers to Discharge Inaccessible home environment   Goals   Patient Goals go home   STG Expiration Date 06/04/18   Short Term Goal #1 10 days:  Bed mobility with Jose David in order to promote independent function  Transfers with Jose David in order to improve independence for home function  Amb with RW 10'x1 with Jose David in order to improve in home mobility    I with WC mobility for distances of 100 ft in order to minimize WB to L foot for improved wound healing and improved in home locomotion  [place] 4 steps with Joan to be able to enter home  Treatment Day 0   Plan   Treatment/Interventions Functional transfer training;LE strengthening/ROM; Elevations; Therapeutic exercise; Endurance training;Patient/family training;Equipment eval/education; Bed mobility;Gait training; Compensatory technique education;Continued evaluation;Spoke to nursing   PT Frequency Other (Comment)  (4-6 times per week )   Recommendation   Recommendation Short-term skilled PT; Home with family support;Home PT   PT - OK to Discharge No   Additional Comments to acheive IPPT goals prior to d/c to home   Modified Toole Scale   Modified Toole Scale 4   Barthel Index   Feeding 10   Bathing 0   Grooming Score 5   Dressing Score 5   Bladder Score 10   Bowels Score 10   Toilet Use Score 5   Transfers (Bed/Chair) Score 10   Mobility (Level Surface) Score 0   Stairs Score 0   Barthel Index Score 55       Alba Calloway, PT

## 2018-05-25 NOTE — PLAN OF CARE
Problem: DISCHARGE PLANNING - CARE MANAGEMENT  Goal: Discharge to post-acute care or home with appropriate resources  INTERVENTIONS:  - Conduct assessment to determine patient/family and health care team treatment goals, and need for post-acute services based on payer coverage, community resources, and patient preferences, and barriers to discharge  - Address psychosocial, clinical, and financial barriers to discharge as identified in assessment in conjunction with the patient/family and health care team  - Arrange appropriate level of post-acute services according to patients   needs and preference and payer coverage in collaboration with the physician and health care team  - Communicate with and update the patient/family, physician, and health care team regarding progress on the discharge plan  - Arrange appropriate transportation to post-acute venues  Outcome: Progressing  Pt is open with Outpatient CM prior to this admission  Pt has L heel gangrenous ulcer, receiving wound care from [facility] for RN prior to this admission  Referral made back to [facility] for RN  PT recommendation was short stay vs VNA  Pt does not want home PT at this time, only RN  Pt stated he was discharge from [facility] 2 weeks ago and feels he would be fine at home  Will follow

## 2018-05-25 NOTE — CONSULTS
Consultation - Infectious Disease   Ramin Estrella 61 y o  male MRN: [id number]  Unit/Bed#: E4 -01 Encounter: [id number]      IMPRESSION & RECOMMENDATIONS:   Impression/Recommendations:  1  Fever  Unclear etiology  Consider viral syndrome given overall malaise  Review of systems and exam benign  WBC, LFTs normal   UA, blood cultures negative  Chest x-ray more suggestive of effusion and patient denies cough making pneumonia unlikely  Appears clinically stable and nontoxic  Rec:  · Continue antibiotics as below  · Check procalcitonin to help guide if antibiotics need to continue  · Follow temperatures closely  · Supportive care as per the primary service  · If fevers continue without obvious source consider CT C/A/P with IV/PO contrast    2  Possible pneumonia  Patient admitted with lack of respiratory symptoms  Chest x-ray suggests more left basilar effusion than infiltrate  Rec:  · Continue vancomycin and cefepime for now  · Check vanco trough with PM dose  · Check procalcitonin as above  · Check MRSA nasal swab  · Follow respiratory symptoms closely    3  Chronic left heel ulcer  No evidence of active infection  X-ray negative for osteomyelitis  Rec:  · Continue serial exams and [address] per podiatry    4  Severe PAD  Eventual outpatient repeat arteriogram when clinically stable    5  Poorly controlled diabetes  A1c = 9 4 on 3/14/18    Antibiotics:  Cefepime/vancomycin # 3    Thank you for this consultation  We will follow along with you  HISTORY OF PRESENT ILLNESS:  Reason for Consult: HCAP    HPI: Ramin Estrella is a 61 y o  male with a history of poorly controlled diabetes and severe PAD  He has a history of a chronic left heel wound  He was recently admitted to [address] [place] [address] in March for this issue  He seen by vascular surgery and underwent unsuccessful attempt at recannulization be arteriogram   They recommended revascularization however this is placed on hold due to CAD and need for CABG    He was seen in follow-up by vascular surgery who recommended repeat attempt at arteriogram which has not yet been performed  He presented to the emergency department on 05/23 complaining of fever and weakness  Upon presentation he was noted to be afebrile but did have tachycardia  His WBC was normal   His lactic acid was normal  No procalcitonin was obtained  He underwent a chest x-ray which showed left posterior basilar consolidation possibly representing pneumonia  A left foot x-ray did not show evidence of osteomyelitis  He was started empirically on cefepime for possible pneumonia  Last night he had a fever to 101 6  We are asked to comment on further evaluation and management  He was seen by Podiatry yesterday who noted necrotic eschar over the heel but no evidence of active infection  REVIEW OF SYSTEMS:  Patient denies any cough  His biggest complaint is extreme fatigue  Denies chest pain, shortness of breath, diarrhea  He did have some nausea  He describes urinary hesitancy but thinks this may be chronic due to prostate issues  A complete system-based review of systems is otherwise negative      PAST MEDICAL HISTORY:  Past Medical History:   Diagnosis Date    Anemia     Anxiety and depression     Autonomic neuropathy     Cataract     [facility]     Diabetic autonomic neuropathy associated with type 2 diabetes mellitus ([facility] )     Last Assessed: 12/28/2017    Gastroparesis due to DM (HCC)     History of DVT (deep vein thrombosis)     left LE    HTN (hypertension)     Hyperlipidemia     Non healing left heel wound     Obesity     Orthostatic hypotension      Past Surgical History:   Procedure Laterality Date    [place] CABG, ARTERY-VEIN, FOUR N/A 3/28/2018    Procedure: CORONARY ARTERY BYPASS GRAFT (CABG) x3 VESSELS, LIMA TO LAD, SVG--> PDA, SVG--> OM2,  RIGHT LEG EVH/SVH TO , INTRA-OP AMANDEEP;  Surgeon: Mike Kothari MD;  [facility];  Service: Cardiac Surgery    ROTATOR CUFF REPAIR Bilateral        FAMILY HISTORY:  Non-contributory    SOCIAL HISTORY:  History   Alcohol Use No     History   Drug Use No     History   Smoking Status    Former Smoker    Packs/day: 1 00    Years: 12 00    Types: Cigarettes    Quit date: 3/23/1994   Smokeless Tobacco    Never Used       ALLERGIES:  Allergies   Allergen Reactions    Metformin        MEDICATIONS:  All current active medications have been reviewed  PHYSICAL EXAM:  Vitals:  HR:  [73-85] 85  Resp:  [18] 18  BP: (138-178)/(71-84) 138/77  SpO2:  [93 %-99 %] 95 %  Temp (24hrs), Av 6 °F (37 °C), Min:97 7 °F (36 5 °C), Max:101 2 °F (38 4 °C)  Current: Temperature: 97 7 °F (36 5 °C)     Physical Exam:  General:  Well-nourished, well-developed, in no acute distress  Eyes:  Conjunctive clear with no hemorrhages or effusions  Oropharynx:  No ulcers, no lesions  Neck:  Supple, no lymphadenopathy  Lungs:  Decreased breath sounds at the left base, no accessory muscle use  Chest:  Well-healed midline sternotomy scar without drainage, fluctuance, or sternal instability  Cardiac:  Regular rate and rhythm, no murmurs  Abdomen:  Soft, non-tender, non-distended  Extremities:  No peripheral cyanosis, clubbing, or edema  Skin:  No rashes, no ulcers  Neurological:  Moves all four extremities spontaneously, sensation grossly intact  Left foot:  Podiatry photos reviewed  Necrotic slough over heel without surrounding cellulitis  LABS, IMAGING, & OTHER STUDIES:  Lab Results:  I have personally reviewed pertinent labs      Results from last 7 days  Lab Units 18  0520 18  0541 18  1757   SODIUM mmol/L 139 139 134*   POTASSIUM mmol/L 3 5 3 2* 3 8   CHLORIDE mmol/L 103 101 98*   CO2 mmol/L 28 27 29   ANION GAP mmol/L 8 11 7   BUN mg/dL 12 12 13   CREATININE mg/dL 0 80 0 76 0 90   EGFR ml/min/1 73sq m 98 101 94   GLUCOSE RANDOM mg/dL 89 69 142*   CALCIUM mg/dL 8 6 8 2* 8 8   AST U/L  --   --  29   ALT U/L  --   --  23   ALK PHOS U/L  --   --  60   TOTAL PROTEIN g/dL  --   --  8 3*   BILIRUBIN TOTAL mg/dL  --   --  0 39       Results from last 7 days  Lab Units 05/24/18  0541 05/23/18  1757 05/22/18  0940   WBC Thousand/uL 8 41 8 40 9 40   HEMOGLOBIN g/dL 10 0* 11 1* 11 2*   PLATELETS Thousands/uL 242 270 294       Results from last 7 days  Lab Units 05/23/18  1805 05/23/18  1756   BLOOD CULTURE  No Growth at 24 hrs  No Growth at 24 hrs  Imaging Studies:   I have personally reviewed pertinent imaging study reports and images in PACS  Chest x-ray left basilar effusion versus small infiltrate    EKG, Pathology, and Other Studies:   I have personally reviewed pertinent reports

## 2018-05-25 NOTE — PLAN OF CARE
Problem: PHYSICAL THERAPY ADULT  Goal: Performs mobility at highest level of function for planned discharge setting  See evaluation for individualized goals  Treatment/Interventions: Functional transfer training, LE strengthening/ROM, Elevations, Therapeutic exercise, Endurance training, Patient/family training, Equipment eval/education, Bed mobility, Gait training, Compensatory technique education, Continued evaluation, Spoke to nursing          See flowsheet documentation for full assessment, interventions and recommendations  Prognosis: Fair  Problem List: Decreased strength, Decreased range of motion, Decreased endurance, Impaired balance, Decreased mobility, Decreased safety awareness, Impaired sensation, Decreased skin integrity, Orthopedic restrictions, Pain  Assessment: Pt is 61 y o  male seen for PT evaluation s/p admit to [address] on 5/23/2018  Two pt identifiers were used to confirm  Pt presented w/ weakness  Pt was admitted with a primary dx of: pnuemonia  PT now consulted for assessment of mobility and d/c needs  Pt with Up with assistance orders  Podiatry consult reviewed and WBAT in [address] on L, R foot in surgical shoe  Pts personal factors affecting treatment include: co morbidities, recent CABG, ongoing L foot wound management, hx of recent rhb, fall risk, VITOR home, multiple lines  Pts current clinical presentation is Unstable/ Unpredictable (high complexity) due to Ongoing medical management for primary dx, Increased reliance on more restrictive AD compared to baseline, Decreased activity tolerance compared to baseline, Fall risk, Increased assistance needed from caregiver at current time, Current WBS, Trending lab values    Prior to admission, pt was ambulating very short distances with RW, primarily using WC on main level of home given L foot wound and attempt to offload as much as possible   Upon evaluation, pt currently is requiring Joan for bed mobility; Joan for transfers and Joan for ambulation w/ RW   Ambulation limited and cues for RW use  Pt presents at PT eval functioning below baseline and currently w/ overall mobility deficits 2* to: BLE weakness, decreased ROM, impaired balance, decreased endurance, gait deviations, pain, decreased activity tolerance compared to baseline, decreased functional mobility tolerance compared to baseline, altered sensation, decreased safety awareness, fall risk, decreased skin integrity  Pt currently at a fall risk 2* to impairments listed above  Pt will continue to benefit from skilled PT interventions to address stated impairments; to maximize functional mobility; for ongoing pt/ family training; and DME needs  At conclusion of PT session pt returned back in chair, all needs in reach and RN notified of session findings/recommendations with phone and call bell within reach  Pt denies any further questions at this time  PT is currently recommending rhb v hhpt pending progress and abiltiy to achieve indpendence with mobiltiy as alone for long periods of day  Pt agreeable to IPPT and progression of mobility while hospitalized  PT will continue to follow during hospital stay  Barriers to Discharge: Inaccessible home environment     Recommendation: Short-term skilled PT, Home with family support, Home PT     PT - OK to Discharge: No    See flowsheet documentation for full assessment

## 2018-05-25 NOTE — TREATMENT PLAN
Remains washed out with malaise as last temp elevation was at midnight procalcitonin returned only a value of 0 06 and will discontinue antibiotics and may be dealing with a viral course consequently made Infectious Disease service aware and they concur

## 2018-05-25 NOTE — CONSULTS
Progress Note - Wound   Romy Multani 61 y o  male MRN: [id number]  Unit/Bed#: E4 -01 Encounter: [id number]      Assessment:   Patient seen per physician consult for left heel wound  Patient sitting up in chair, denies pain  Patient has history of PAD with unsuccessful revascularization attempt, type 2 diabetes mellitus with neuropathy, CHF with lower extremity swelling, and orthostatic hypotension  Nutrition team following--Gaurav BID ordered  Patient has healed midline chest incision that is open to air with some scabbing proximally and distally  He has a scabbed incision to his right medial lower leg that is open to air  Buttocks, sacrum, and skin folds are intact  Lower extremities are dry and flaky with mild edema  1   Present on admission left plantar heel ulcer (gibson 2 per podiatry)--wound not assessed, podiatry following, providing local wound care, dressing dry and intact  Patient is wearing surgical shoe to right foot and DARCO wedge shoe to left foot  Plan:   1  Sofcare cushion when OOB to chair  2   Moisturize skin daily and PRN with nourishing lotion  3   Apply Hydraguard lotion to sacrum and right heel BID & PRN  4   Elevate heels off of bed on pillows to offload  Plan of care reviewed with primary Sheila RICHMOND  Preventative orders placed  Wound care team will sign-off  Objective:    Vitals: Blood pressure 133/88, pulse 76, temperature 98 °F (36 7 °C), temperature source Tympanic, resp  rate 18, height 6' 1" (1 854 m), weight 116 kg (256 lb 6 3 oz), SpO2 97 %  ,Body mass index is 33 83 kg/m²      Gonzalez MALAVEN, RN, CCRN ()

## 2018-05-25 NOTE — SOCIAL WORK
Pt was recently at [facility] and transfer to [facility] for a CABG on 3/23/18  Pt was discharge from [facility] to [address] [place] [address] on 4/3/18  PCP is Dr Wanda Fernández  Pt lives in [place] with his spouse in a 2 story house with 3 steps to enter the front door  There is a half a bathroom on the first floor  Pt only goes upstairs to shower because it is difficult to do steps  Pt sleeps in recliner  Pt amb with rollator and was independent with ADLs  Pt wears a darco shoe on the L foot and was NWB  Pt was not able to drive car any more  Pt is now on disability and it has been a struggle financially  Spouse works full time and part time  Pt is on his own for most of the day  DME:  Rollator, SPC, and Quad cane  Pt uses [employer] in [place]  Pt has hx of Anxiety & Depression on H&P  Pt stated his son will pick him when he is discharge  Spouse is  - Radha Benitez [phone]  Pt is open with Outpatient CM prior to this admission  Pt has L heel gangrenous ulcer, receiving wound care from [facility] for RN prior to this admission  Referral made back to [facility] for RN  PT recommendation was short stay vs VNA  Pt does not want home PT at this time, only RN  Pt stated he was discharge from [place] 2 weeks ago and feels he would be fine at home  Will follow

## 2018-05-25 NOTE — PROGRESS NOTES
[facility] Progress Note  Patient: Mick Logan 61 y o  male   MRN: [id number]  PCP: Ester Lowery DO  Unit/Bed#: E4 -01 Encounter: [id number]  Date Of Visit: 05/25/18    Assessment:    Principal Problem:    Healthcare-associated pneumonia  Active Problems:    Hyperlipidemia    Vitamin D deficiency    PAD (peripheral artery disease) (HCC)    Elevated troponin    Non-healing wound of left heel    Orthostatic hypotension    Diabetic gastroparesis (HCC)    S/P CABG x 3    Chronic CHF ([facility] [address] )      Plan:    · Healthcare associated pneumonia after recent course of rehab post CABG more recently and diabetic foot ulcer presents with fever and cough  Chest x-ray showing left basilar pneumonia presumably healthcare acquired will treat with cefepime and vancomycin  Not producing sputum but essential we also do pulmonary toilet given recent CABG  Lactic acid was within normal limits will get Infectious Disease to see pro calcitonin pending remains febrile after 1st 24 hr of IV antibiotics   · Peripheral arterial disease with left heel diabetic foot ulcer  undergoing ongoing wound care at rehab w/ podiatry to follow while here/x-ray did not show any signs of osteomyelitis with schedule have arteriogram of left lower extremity at [place] March 29th and would consult vascular team if is still here next week to have done here  · Elevated troponin may be in relation to recent CABG will trend seen by Cardiology feel troponins nonspecific  · Diabetes mellitus/diabetic gastroparesis continue sliding scale coverage and Lantus 70 units q 12continue Reglan  · Hyperlipidemia continue atorvastatin 80 mg  · Recent CABG 3 vessel continue aspirin continue statin      VTE Pharmacologic Prophylaxis:   Pharmacologic: Enoxaparin (Lovenox)  Mechanical VTE Prophylaxis in Place: Yes    Discussions with Specialists or Other Care Team Provider: no    Time Spent for Care: 45 minutes    More than 50% of total time spent on counseling and coordination of care as described above  Subjective:   Alert oriented remains weak but no further febrile presentation incident admission he has difficulty in obtaining any sputum production he is able to ambulate with the help will walker and partial weight-bearing of left leg and wants to be able to go the bathroom  Was doing better yesterday but last night had febrile course and remains weak this morning and washed out    Objective:     Vitals:   Temp (24hrs), Av 6 °F (37 °C), Min:97 7 °F (36 5 °C), Max:101 2 °F (38 4 °C)    HR:  [73-85] 85  Resp:  [18] 18  BP: (138-178)/(71-84) 138/77  SpO2:  [93 %-99 %] 95 %  Body mass index is 33 83 kg/m²  Input and Output Summary (last 24 hours):        Intake/Output Summary (Last 24 hours) at 18 0900  Last data filed at 18 0501   Gross per 24 hour   Intake              360 ml   Output             2180 ml   Net            -1820 ml       Physical Exam:     Physical Exam:   General appearance: alert, appears stated age and cooperative  Head: Normocephalic, without obvious abnormality, atraumatic  Lungs: clear to auscultation bilaterally  Heart: regular rate and rhythm  Abdomen: soft, non-tender; bowel sounds normal; no masses,  no organomegaly  Back: negative  Extremities: Diminished pulses peripherally and heel ulceration stage II noted and extremities normal, atraumatic, no cyanosis or edema  Neurologic: Grossly normal      Additional Data:     Labs:      Results from last 7 days  Lab Units 18  0541 18  1757   WBC Thousand/uL 8 41 8 40   HEMOGLOBIN g/dL 10 0* 11 1*   HEMATOCRIT % 31 9* 35 4*   PLATELETS Thousands/uL 242 270   NEUTROS PCT %  --  83*   LYMPHS PCT %  --  9*   MONOS PCT %  --  7   EOS PCT %  --  1       Results from last 7 days  Lab Units 18  0520  18  1757   SODIUM mmol/L 139  < > 134*   POTASSIUM mmol/L 3 5  < > 3 8   CHLORIDE mmol/L 103  < > 98*   CO2 mmol/L 28  < > 29   BUN mg/dL 12  < > 13 CREATININE mg/dL 0 80  < > 0 90   CALCIUM mg/dL 8 6  < > 8 8   TOTAL PROTEIN g/dL  --   --  8 3*   BILIRUBIN TOTAL mg/dL  --   --  0 39   ALK PHOS U/L  --   --  60   ALT U/L  --   --  23   AST U/L  --   --  29   GLUCOSE RANDOM mg/dL 89  < > 142*   < > = values in this interval not displayed  Results from last 7 days  Lab Units 05/23/18  1756   INR  1 13       * I Have Reviewed All Lab Data Listed Above  * Additional Pertinent Lab Tests Reviewed: All Labs For Current Hospital Admission Reviewed    Imaging:  Xr Chest 2 Views    Result Date: 5/23/2018  Narrative: CHEST INDICATION:   fever  COMPARISON:  3/29/2018 EXAM PERFORMED/VIEWS:  XR CHEST PA & LATERAL FINDINGS: Interval removal of left chest tube and right IJ venous catheter  Postsurgical change from median sternotomy  Mild cardiomegaly  Left posterior basilar airspace consolidation  No pneumothorax or pleural effusion  Postsurgical change in the left shoulder  Chronic right posterior 7th rib fracture  Impression: Left posterior basilar airspace consolidation may represent pneumonia  Workstation performed: [id number]     Xr Foot 3+ Vw Left    Result Date: 5/23/2018  Narrative: LEFT FOOT INDICATION:   Concern for osteomyelitis  Technologist reports the patient has a heel wound and redness and warmth of the lower extremity as well as fever  COMPARISON:  3/14/2018 VIEWS:  XR FOOT 3+ VW LEFT FINDINGS: Diffuse soft tissue swelling at the foot  Soft tissue lucency at the plantar aspect of the heel suggest large soft tissue wound  No subjacent osseous demineralization  No radiopaque foreign body  Articular alignment is maintained  No dislocation  Mild degenerative changes in the 1st MTP joint  No periarticular erosions  No acute fractures or focal lytic/blastic lesion  Thickening of the distal Achilles with insertional enthesopathy  Impression: Soft tissue swelling at the foot  Lucency of the heel suggest large wound   No subjacent bony demineralization to suggest osteomyelitis, though conventional radiographic sensitivity is somewhat limited  Workstation performed: [id number]     Imaging Reports Reviewed Today Include:  X-ray of left foot reviewed chest x-ray reviewed  Imaging Personally Reviewed by Myself Includes:    Procedure: Xr Chest 2 Views    Result Date: 5/23/2018  Narrative: CHEST INDICATION:   fever  COMPARISON:  3/29/2018 EXAM PERFORMED/VIEWS:  XR CHEST PA & LATERAL FINDINGS: Interval removal of left chest tube and right IJ venous catheter  Postsurgical change from median sternotomy  Mild cardiomegaly  Left posterior basilar airspace consolidation  No pneumothorax or pleural effusion  Postsurgical change in the left shoulder  Chronic right posterior 7th rib fracture  Impression: Left posterior basilar airspace consolidation may represent pneumonia  Workstation performed: [id number]     Procedure: Xr Foot 3+ Vw Left    Result Date: 5/23/2018  Narrative: LEFT FOOT INDICATION:   Concern for osteomyelitis  Technologist reports the patient has a heel wound and redness and warmth of the lower extremity as well as fever  COMPARISON:  3/14/2018 VIEWS:  XR FOOT 3+ VW LEFT FINDINGS: Diffuse soft tissue swelling at the foot  Soft tissue lucency at the plantar aspect of the heel suggest large soft tissue wound  No subjacent osseous demineralization  No radiopaque foreign body  Articular alignment is maintained  No dislocation  Mild degenerative changes in the 1st MTP joint  No periarticular erosions  No acute fractures or focal lytic/blastic lesion  Thickening of the distal Achilles with insertional enthesopathy  Impression: Soft tissue swelling at the foot  Lucency of the heel suggest large wound  No subjacent bony demineralization to suggest osteomyelitis, though conventional radiographic sensitivity is somewhat limited   Workstation performed: [id number]        Recent Cultures (last 7 days):       Results from last 7 days  Lab Units 05/23/18  1805 05/23/18  1756   BLOOD CULTURE  No Growth at 24 hrs  No Growth at 24 hrs  Last 24 Hours Medication List:     Current Facility-Administered Medications:  acetaminophen 650 mg Oral Q6H PRN [facility], PA-C    aspirin 325 mg Oral Daily [facility], PA-C    atorvastatin 80 mg Oral Daily With [employer], PA-C    cefepime 2,000 mg Intravenous Q12H [facility], PA-C Last Rate: 2,000 mg (05/25/18 0844)   docusate sodium 100 mg Oral BID [facility], PA-C    enoxaparin 40 mg Subcutaneous Daily [facility] PA-C    ferrous sulfate 325 mg Oral BID With [employer] [facility] PA-C    fludrocortisone 0 1 mg Oral Daily [employer], PA-C    furosemide 20 mg Oral Daily [employer] [facility], PA-C    insulin glargine 70 Units Subcutaneous Q12H [facility]., PA-C    insulin lispro 1-5 Units Subcutaneous HS [facility] PA-C    insulin lispro 2-12 Units Subcutaneous TID AC [employer], PA-C    metoclopramide 5 mg Oral Daily [employer] [facility], PA-C    midodrine 10 mg Oral TID [employer] [facility] PA-C    ondansetron 4 mg Intravenous Q6H PRN [employer] [facility], PA-C    polyethylene glycol 17 g Oral Daily PRN [employer] [facility], PA-C    tamsulosin 0 4 mg Oral Daily With [employer], PA-C    vancomycin 15 mg/kg (Adjusted) Intravenous Q12H [employer], PA-C Last Rate: 1,500 mg (05/24/18 2225)        Today, Patient Was Seen By: Divya Hilliard MD    ** Please Note: Dragon 360 Dictation voice to text software may have been used in the creation of this document   **

## 2018-05-26 PROBLEM — R50.9 FEBRILE ILLNESS, ACUTE: Status: ACTIVE | Noted: 2018-05-26

## 2018-05-26 LAB
GLUCOSE SERPL-MCNC: 165 MG/DL (ref 65–140)
GLUCOSE SERPL-MCNC: 171 MG/DL (ref 65–140)
GLUCOSE SERPL-MCNC: 188 MG/DL (ref 65–140)
GLUCOSE SERPL-MCNC: 83 MG/DL (ref 65–140)
MRSA NOSE QL CULT: NORMAL
PROCALCITONIN SERPL-MCNC: 0.06 NG/ML

## 2018-05-26 PROCEDURE — 84145 PROCALCITONIN (PCT): CPT | Performed by: INTERNAL MEDICINE

## 2018-05-26 PROCEDURE — 99232 SBSQ HOSP IP/OBS MODERATE 35: CPT | Performed by: INTERNAL MEDICINE

## 2018-05-26 PROCEDURE — 82948 REAGENT STRIP/BLOOD GLUCOSE: CPT

## 2018-05-26 RX ADMIN — TAMSULOSIN HYDROCHLORIDE 0.4 MG: 0.4 CAPSULE ORAL at 17:52

## 2018-05-26 RX ADMIN — DOCUSATE SODIUM 100 MG: 100 CAPSULE, LIQUID FILLED ORAL at 17:53

## 2018-05-26 RX ADMIN — FERROUS SULFATE TAB 325 MG (65 MG ELEMENTAL FE) 325 MG: 325 (65 FE) TAB at 09:03

## 2018-05-26 RX ADMIN — METOCLOPRAMIDE HYDROCHLORIDE 5 MG: 10 TABLET ORAL at 09:03

## 2018-05-26 RX ADMIN — DOCUSATE SODIUM 100 MG: 100 CAPSULE, LIQUID FILLED ORAL at 09:03

## 2018-05-26 RX ADMIN — FERROUS SULFATE TAB 325 MG (65 MG ELEMENTAL FE) 325 MG: 325 (65 FE) TAB at 17:52

## 2018-05-26 RX ADMIN — INSULIN GLARGINE 40 UNITS: 100 INJECTION, SOLUTION SUBCUTANEOUS at 09:04

## 2018-05-26 RX ADMIN — INSULIN LISPRO 2 UNITS: 100 INJECTION, SOLUTION INTRAVENOUS; SUBCUTANEOUS at 17:53

## 2018-05-26 RX ADMIN — INSULIN GLARGINE 70 UNITS: 100 INJECTION, SOLUTION SUBCUTANEOUS at 20:52

## 2018-05-26 RX ADMIN — FLUDROCORTISONE ACETATE 0.1 MG: 0.1 TABLET ORAL at 09:06

## 2018-05-26 RX ADMIN — MIDODRINE HYDROCHLORIDE 10 MG: 5 TABLET ORAL at 09:03

## 2018-05-26 RX ADMIN — ATORVASTATIN CALCIUM 80 MG: 80 TABLET, FILM COATED ORAL at 17:52

## 2018-05-26 RX ADMIN — ENOXAPARIN SODIUM 40 MG: 40 INJECTION SUBCUTANEOUS at 09:03

## 2018-05-26 RX ADMIN — INSULIN LISPRO 2 UNITS: 100 INJECTION, SOLUTION INTRAVENOUS; SUBCUTANEOUS at 12:38

## 2018-05-26 RX ADMIN — ASPIRIN 325 MG: 325 TABLET ORAL at 09:03

## 2018-05-26 RX ADMIN — FUROSEMIDE 20 MG: 20 TABLET ORAL at 09:03

## 2018-05-26 RX ADMIN — INSULIN LISPRO 1 UNITS: 100 INJECTION, SOLUTION INTRAVENOUS; SUBCUTANEOUS at 21:06

## 2018-05-26 RX ADMIN — MIDODRINE HYDROCHLORIDE 10 MG: 5 TABLET ORAL at 17:52

## 2018-05-26 NOTE — PROGRESS NOTES
[facility] Progress Note  Patient: Lisa Henderson 61 y o  male   MRN: [id number]  PCP: Cira Peña DO  Unit/Bed#: E4 -01 Encounter: [id number]  Date Of Visit: 05/26/18    Assessment:    Principal Problem:    Healthcare-associated pneumonia  Active Problems:    Hyperlipidemia    Vitamin D deficiency    PAD (peripheral artery disease) (HCC)    Elevated troponin    Non-healing wound of left heel    Orthostatic hypotension    Diabetic gastroparesis (HCC)    S/P CABG x 3    Chronic CHF ([facility] [address] )      Plan:    · Febrile illness after recent course of rehab post CABG  and diabetic foot ulcer presents with fever and cough  No significant leukocytosis  Chest x-ray showing left basilar pneumonia versus effusion atelectasis presumably healthcare acquired was treat with cefepime and vancomycin  Was treated with initial course of antibiotics however procalcitonin level is normal and antibiotics discontinued awaiting respiratory viral panel    Had only low-grade temperature last night if remains afebrile in next 24 hours would discharge so can have follow-up arteriogram at [facility] of scheduled on the 29th  · Peripheral arterial disease with left heel diabetic foot ulcer  undergoing ongoing wound care at rehab w/ podiatry to follow while here/x-ray did not show any signs of osteomyelitis with schedule have arteriogram of left lower extremity at [facility] March 29th and would consult vascular team if is still here next week to have done here  · Elevated troponin may be in relation to recent CABG will trend seen by Cardiology feel troponins nonspecific  · Diabetes mellitus/diabetic gastroparesis continue sliding scale coverage and Lantus 70 units q 12continue Reglan  · Hyperlipidemia continue atorvastatin 80 mg  · Recent CABG 3 vessel continue aspirin continue statin      VTE Pharmacologic Prophylaxis:   Pharmacologic: Enoxaparin (Lovenox)  Mechanical VTE Prophylaxis in Place: Yes    Discussions with Specialists or Other Care Team Provider: no    Time Spent for Care: 45 minutes  More than 50% of total time spent on counseling and coordination of care as described above  Subjective:   Alert oriented remains some weakness but no cough and no further myalgias no respiratory congestion no chest pain    Objective:     Vitals:   Temp (24hrs), Av 6 °F (37 °C), Min:97 9 °F (36 6 °C), Max:99 8 °F (37 7 °C)    HR:  [65-77] 66  Resp:  [18] 18  BP: (105-159)/(71-80) 118/72  SpO2:  [90 %-98 %] 94 %  Body mass index is 33 83 kg/m²  Input and Output Summary (last 24 hours):        Intake/Output Summary (Last 24 hours) at 18 1216  Last data filed at 18 0730   Gross per 24 hour   Intake             1110 ml   Output             2425 ml   Net            -1315 ml       Physical Exam:     Physical Exam:   General appearance: alert, appears stated age and cooperative  Head: Normocephalic, without obvious abnormality, atraumatic  Lungs: clear to auscultation bilaterally  Heart: regular rate and rhythm sternotomy scar intact without erythema  Abdomen: soft, non-tender; bowel sounds normal; no masses,  no organomegaly  Back: negative  Extremities: Diminished pulses peripherally and heel ulceration stage II noted and extremities normal, atraumatic, no cyanosis or edema  Neurologic: Grossly normal      Additional Data:     Labs:      Results from last 7 days  Lab Units 18  0541 18  1757   WBC Thousand/uL 8 41 8 40   HEMOGLOBIN g/dL 10 0* 11 1*   HEMATOCRIT % 31 9* 35 4*   PLATELETS Thousands/uL 242 270   NEUTROS PCT %  --  83*   LYMPHS PCT %  --  9*   MONOS PCT %  --  7   EOS PCT %  --  1       Results from last 7 days  Lab Units 18  0520  18  1757   SODIUM mmol/L 139  < > 134*   POTASSIUM mmol/L 3 5  < > 3 8   CHLORIDE mmol/L 103  < > 98*   CO2 mmol/L 28  < > 29   BUN mg/dL 12  < > 13   CREATININE mg/dL 0 80  < > 0 90   CALCIUM mg/dL 8 6  < > 8 8   TOTAL PROTEIN g/dL  --   --  8 3* BILIRUBIN TOTAL mg/dL  --   --  0 39   ALK PHOS U/L  --   --  60   ALT U/L  --   --  23   AST U/L  --   --  29   GLUCOSE RANDOM mg/dL 89  < > 142*   < > = values in this interval not displayed  Results from last 7 days  Lab Units 05/23/18  1756   INR  1 13       * I Have Reviewed All Lab Data Listed Above  * Additional Pertinent Lab Tests Reviewed: All Labs For Current Hospital Admission Reviewed    Imaging:  Xr Chest 2 Views    Result Date: 5/23/2018  Narrative: CHEST INDICATION:   fever  COMPARISON:  3/29/2018 EXAM PERFORMED/VIEWS:  XR CHEST PA & LATERAL FINDINGS: Interval removal of left chest tube and right IJ venous catheter  Postsurgical change from median sternotomy  Mild cardiomegaly  Left posterior basilar airspace consolidation  No pneumothorax or pleural effusion  Postsurgical change in the left shoulder  Chronic right posterior 7th rib fracture  Impression: Left posterior basilar airspace consolidation may represent pneumonia  Workstation performed: [id number]     Xr Foot 3+ Vw Left    Result Date: 5/23/2018  Narrative: LEFT FOOT INDICATION:   Concern for osteomyelitis  Technologist reports the patient has a heel wound and redness and warmth of the lower extremity as well as fever  COMPARISON:  3/14/2018 VIEWS:  XR FOOT 3+ VW LEFT FINDINGS: Diffuse soft tissue swelling at the foot  Soft tissue lucency at the plantar aspect of the heel suggest large soft tissue wound  No subjacent osseous demineralization  No radiopaque foreign body  Articular alignment is maintained  No dislocation  Mild degenerative changes in the 1st MTP joint  No periarticular erosions  No acute fractures or focal lytic/blastic lesion  Thickening of the distal Achilles with insertional enthesopathy  Impression: Soft tissue swelling at the foot  Lucency of the heel suggest large wound  No subjacent bony demineralization to suggest osteomyelitis, though conventional radiographic sensitivity is somewhat limited  Workstation performed: [id number]     Imaging Reports Reviewed Today Include:  X-ray of left foot reviewed chest x-ray reviewed  Imaging Personally Reviewed by Myself Includes:    Procedure: Xr Chest 2 Views    Result Date: 5/23/2018  Narrative: CHEST INDICATION:   fever  COMPARISON:  3/29/2018 EXAM PERFORMED/VIEWS:  XR CHEST PA & LATERAL FINDINGS: Interval removal of left chest tube and right IJ venous catheter  Postsurgical change from median sternotomy  Mild cardiomegaly  Left posterior basilar airspace consolidation  No pneumothorax or pleural effusion  Postsurgical change in the left shoulder  Chronic right posterior 7th rib fracture  Impression: Left posterior basilar airspace consolidation may represent pneumonia  Workstation performed: [id number]     Procedure: Xr Foot 3+ Vw Left    Result Date: 5/23/2018  Narrative: LEFT FOOT INDICATION:   Concern for osteomyelitis  Technologist reports the patient has a heel wound and redness and warmth of the lower extremity as well as fever  COMPARISON:  3/14/2018 VIEWS:  XR FOOT 3+ VW LEFT FINDINGS: Diffuse soft tissue swelling at the foot  Soft tissue lucency at the plantar aspect of the heel suggest large soft tissue wound  No subjacent osseous demineralization  No radiopaque foreign body  Articular alignment is maintained  No dislocation  Mild degenerative changes in the 1st MTP joint  No periarticular erosions  No acute fractures or focal lytic/blastic lesion  Thickening of the distal Achilles with insertional enthesopathy  Impression: Soft tissue swelling at the foot  Lucency of the heel suggest large wound  No subjacent bony demineralization to suggest osteomyelitis, though conventional radiographic sensitivity is somewhat limited  Workstation performed: [id number]        Recent Cultures (last 7 days):       Results from last 7 days  Lab Units 05/23/18  1805 05/23/18  1756   BLOOD CULTURE  No Growth at 48 hrs  No Growth at 48 hrs         Last 24 Hours Medication List:     Current Facility-Administered Medications:  acetaminophen 650 mg Oral Q6H PRN Lisbeth Lucero PA-C   aspirin 325 mg Oral Daily Lisbeth Lucero PA-C   atorvastatin 80 mg Oral Daily With [employer], NUNO   docusate sodium 100 mg Oral BID Lisbeth Lucero PA-C   enoxaparin 40 mg Subcutaneous Daily Lisbeth Lucero PA-C   ferrous sulfate 325 mg Oral BID With Meals Lisbeth uLcero PA-C   fludrocortisone 0 1 mg Oral Daily Lisbeth Lucero PA-C   furosemide 20 mg Oral Daily Checlemencia Lucero PA-C   insulin glargine 70 Units Subcutaneous Q12H [facility] Lisbeht Lucero PA-C   insulin lispro 1-5 Units Subcutaneous HS Lisbeth Lucero PA-C   insulin lispro 2-12 Units Subcutaneous TID AC Lisbeth Lucero PA-C   metoclopramide 5 mg Oral Daily Lisbeth Lucero PA-C   midodrine 10 mg Oral TID With Meals Lisbeth Lucero PA-C   ondansetron 4 mg Intravenous Q6H PRN Lisbeth Lucero PA-C   polyethylene glycol 17 g Oral Daily PRN Checlemencia Lucero PA-C   tamsulosin 0 4 mg Oral Daily With Dinner Lisbeth Lucero PA-C        Today, Patient Was Seen By: Rylee Valentin MD    ** Please Note: Dragon 360 Dictation voice to text software may have been used in the creation of this document   **

## 2018-05-26 NOTE — PROGRESS NOTES
Progress Note - Infectious Disease   Russel Amin 61 y o  male MRN: [id number]  Unit/Bed#: E4 -01 Encounter: [id number]      Impression/Recommendations:  1  Fever  Consider viral syndrome given overall malaise  Review of systems and exam benign  WBC, LFTs normal   UA, blood cultures negative  Chest x-ray more suggestive of effusion  Low procalcitonin makes bacterial infection unlikely  Appears clinically stable and nontoxic  Rec:  ? Continue to follow closely off antibiotics  ? Follow up respiratory viral PCR ordered by primary service  ? Follow temperatures closely  ? Supportive care as per the primary service     2  Possible pneumonia  Patient admitted with lack of respiratory symptoms  Chest x-ray suggests more left basilar effusion than infiltrate  Low procalcitonin makes bacterial infection unlikely  Rec:  ? Continue to follow closely off antibiotics  ? Follow up respiratory viral PCR ordered by primary service  ? Follow respiratory symptoms closely     3  Chronic left heel ulcer  No evidence of active infection  X-ray negative for osteomyelitis  Rec:  ? Continue serial exams and [address] per podiatry     4  Severe PAD  Eventual outpatient repeat arteriogram when clinically stable     5  Poorly controlled diabetes  A1c = 9 4 on 3/14/18     The patient is stable from an ID standpoint  If remains afebrile would be stable for discharge at any time  If the patient is still here, I will reassess the patient on Monday 5/28  Please call in the interim with new questions  Antibiotics:  Off antibiotics #1    Subjective:  Patient seen on AM rounds  Feels much better today although had low-grade fever overnight    24 Hour Events:  Approached calcitonin came back low yesterday and antibiotics were discontinued  Low-grade fever noted overnight  No nausea, vomiting, or diarrhea noted      Objective:  Vitals:  HR:  [65-77] 65  Resp:  [18] 18  BP: (105-159)/(71-88) 138/71  SpO2:  [90 %-98 %] 90 %  Temp (24hrs), Av 6 °F (37 °C), Min:97 9 °F (36 6 °C), Max:99 8 °F (37 7 °C)  Current: Temperature: 97 9 °F (36 6 °C)    Physical Exam:   General:  No acute distress  Psychiatric:  Awake and alert  Pulmonary:  Normal respiratory excursion without accessory muscle use  Abdomen:  Soft, nontender  Extremities:  No edema  Skin:  No rashes    Lab Results:  I have personally reviewed pertinent labs  Results from last 7 days  Lab Units 18  0520 18  0541 18  1757   SODIUM mmol/L 139 139 134*   POTASSIUM mmol/L 3 5 3 2* 3 8   CHLORIDE mmol/L 103 101 98*   CO2 mmol/L 28 27 29   ANION GAP mmol/L 8 11 7   BUN mg/dL 12 12 13   CREATININE mg/dL 0 80 0 76 0 90   EGFR ml/min/1 73sq m 98 101 94   GLUCOSE RANDOM mg/dL 89 69 142*   CALCIUM mg/dL 8 6 8 2* 8 8   AST U/L  --   --  29   ALT U/L  --   --  23   ALK PHOS U/L  --   --  60   TOTAL PROTEIN g/dL  --   --  8 3*   BILIRUBIN TOTAL mg/dL  --   --  0 39       Results from last 7 days  Lab Units 18  0541 18  1757 18  0940   WBC Thousand/uL 8 41 8 40 9 40   HEMOGLOBIN g/dL 10 0* 11 1* 11 2*   PLATELETS Thousands/uL 242 270 294       Results from last 7 days  Lab Units 18  1805 18  1756   BLOOD CULTURE  No Growth at 48 hrs  No Growth at 48 hrs  Imaging Studies:   I have personally reviewed pertinent imaging study reports and images in PACS  EKG, Pathology, and Other Studies:   I have personally reviewed pertinent reports

## 2018-05-26 NOTE — PROGRESS NOTES
[facility] Progress Note  Patient: Sadi Cates 61 y o  male   MRN: [id number]  PCP: Ana Reynolds DO  Unit/Bed#: E4 -01 Encounter: [id number]  Date Of Visit: 05/26/18    Assessment:    Principal Problem:    Febrile illness, acute  Active Problems:    Hyperlipidemia    Vitamin D deficiency    PAD (peripheral artery disease) (HCC)    Elevated troponin    Non-healing wound of left heel    Orthostatic hypotension    Diabetic gastroparesis (HCC)    S/P CABG x 3    Chronic CHF ([facility] [address] )      Plan:    · Febrile illness after recent course of rehab post CABG  and diabetic foot ulcer presents with fever and cough  No significant leukocytosis  Chest x-ray showing left basilar pneumonia versus effusion atelectasis presumably healthcare acquired was placed on empiric cefepime and vancomycin  After an initial course of antibiotics times 48 hours however procalcitonin level is normal and antibiotics discontinued awaiting respiratory viral panel    Had only low-grade temperature last night if remains afebrile in next 24 hours and may be presumed viral   Would discharge so can have follow-up arteriogram at [facility] of scheduled on the 29th  · Peripheral arterial disease with left heel diabetic foot ulcer  undergoing ongoing wound care at rehab w/ podiatry to follow while here/x-ray did not show any signs of osteomyelitis with schedule have arteriogram of left lower extremity at [facility] March 29th and would consult vascular team if is still here next week to have done here  · Elevated troponin may be in relation to recent CABG will trend seen by Cardiology feel troponins nonspecific  · Diabetes mellitus/diabetic gastroparesis continue sliding scale coverage and Lantus 70 units q 12continue Reglan  · Hyperlipidemia continue atorvastatin 80 mg  · Recent CABG 3 vessel continue aspirin continue statin      VTE Pharmacologic Prophylaxis:   Pharmacologic: Enoxaparin (Lovenox)  Mechanical VTE Prophylaxis in Place: Yes    Discussions with Specialists or Other Care Team Provider: no    Time Spent for Care: 45 minutes  More than 50% of total time spent on counseling and coordination of care as described above  Subjective:   Alert oriented remains some weakness but no cough and no further myalgias no respiratory congestion no chest pain    Objective:     Vitals:   Temp (24hrs), Av 6 °F (37 °C), Min:97 9 °F (36 6 °C), Max:99 8 °F (37 7 °C)    HR:  [65-77] 66  Resp:  [18] 18  BP: (105-159)/(71-80) 118/72  SpO2:  [90 %-98 %] 94 %  Body mass index is 33 83 kg/m²  Input and Output Summary (last 24 hours):        Intake/Output Summary (Last 24 hours) at 18 1222  Last data filed at 18 0730   Gross per 24 hour   Intake             1110 ml   Output             2425 ml   Net            -1315 ml       Physical Exam:     Physical Exam:   General appearance: alert, appears stated age and cooperative  Head: Normocephalic, without obvious abnormality, atraumatic  Lungs: clear to auscultation bilaterally  Heart: regular rate and rhythm sternotomy scar intact without erythema  Abdomen: soft, non-tender; bowel sounds normal; no masses,  no organomegaly  Back: negative  Extremities: Diminished pulses peripherally and heel ulceration stage II noted and extremities normal, atraumatic, no cyanosis or edema  Neurologic: Grossly normal      Additional Data:     Labs:      Results from last 7 days  Lab Units 18  0541 18  1757   WBC Thousand/uL 8 41 8 40   HEMOGLOBIN g/dL 10 0* 11 1*   HEMATOCRIT % 31 9* 35 4*   PLATELETS Thousands/uL 242 270   NEUTROS PCT %  --  83*   LYMPHS PCT %  --  9*   MONOS PCT %  --  7   EOS PCT %  --  1       Results from last 7 days  Lab Units 18  0520  18  1757   SODIUM mmol/L 139  < > 134*   POTASSIUM mmol/L 3 5  < > 3 8   CHLORIDE mmol/L 103  < > 98*   CO2 mmol/L 28  < > 29   BUN mg/dL 12  < > 13   CREATININE mg/dL 0 80  < > 0 90   CALCIUM mg/dL 8 6  < > 8 8   TOTAL PROTEIN g/dL  --   --  8 3*   BILIRUBIN TOTAL mg/dL  --   --  0 39   ALK PHOS U/L  --   --  60   ALT U/L  --   --  23   AST U/L  --   --  29   GLUCOSE RANDOM mg/dL 89  < > 142*   < > = values in this interval not displayed  Results from last 7 days  Lab Units 05/23/18  1756   INR  1 13       * I Have Reviewed All Lab Data Listed Above  * Additional Pertinent Lab Tests Reviewed: All Labs For Current Hospital Admission Reviewed    Imaging:  Xr Chest 2 Views    Result Date: 5/23/2018  Narrative: CHEST INDICATION:   fever  COMPARISON:  3/29/2018 EXAM PERFORMED/VIEWS:  XR CHEST PA & LATERAL FINDINGS: Interval removal of left chest tube and right IJ venous catheter  Postsurgical change from median sternotomy  Mild cardiomegaly  Left posterior basilar airspace consolidation  No pneumothorax or pleural effusion  Postsurgical change in the left shoulder  Chronic right posterior 7th rib fracture  Impression: Left posterior basilar airspace consolidation may represent pneumonia  Workstation performed: [id number]     Xr Foot 3+ Vw Left    Result Date: 5/23/2018  Narrative: LEFT FOOT INDICATION:   Concern for osteomyelitis  Technologist reports the patient has a heel wound and redness and warmth of the lower extremity as well as fever  COMPARISON:  3/14/2018 VIEWS:  XR FOOT 3+ VW LEFT FINDINGS: Diffuse soft tissue swelling at the foot  Soft tissue lucency at the plantar aspect of the heel suggest large soft tissue wound  No subjacent osseous demineralization  No radiopaque foreign body  Articular alignment is maintained  No dislocation  Mild degenerative changes in the 1st MTP joint  No periarticular erosions  No acute fractures or focal lytic/blastic lesion  Thickening of the distal Achilles with insertional enthesopathy  Impression: Soft tissue swelling at the foot  Lucency of the heel suggest large wound   No subjacent bony demineralization to suggest osteomyelitis, though conventional radiographic sensitivity is somewhat limited  Workstation performed: [id number]     Imaging Reports Reviewed Today Include:  X-ray of left foot reviewed chest x-ray reviewed  Imaging Personally Reviewed by Myself Includes:    Procedure: Xr Chest 2 Views    Result Date: 5/23/2018  Narrative: CHEST INDICATION:   fever  COMPARISON:  3/29/2018 EXAM PERFORMED/VIEWS:  XR CHEST PA & LATERAL FINDINGS: Interval removal of left chest tube and right IJ venous catheter  Postsurgical change from median sternotomy  Mild cardiomegaly  Left posterior basilar airspace consolidation  No pneumothorax or pleural effusion  Postsurgical change in the left shoulder  Chronic right posterior 7th rib fracture  Impression: Left posterior basilar airspace consolidation may represent pneumonia  Workstation performed: [id number]     Procedure: Xr Foot 3+ Vw Left    Result Date: 5/23/2018  Narrative: LEFT FOOT INDICATION:   Concern for osteomyelitis  Technologist reports the patient has a heel wound and redness and warmth of the lower extremity as well as fever  COMPARISON:  3/14/2018 VIEWS:  XR FOOT 3+ VW LEFT FINDINGS: Diffuse soft tissue swelling at the foot  Soft tissue lucency at the plantar aspect of the heel suggest large soft tissue wound  No subjacent osseous demineralization  No radiopaque foreign body  Articular alignment is maintained  No dislocation  Mild degenerative changes in the 1st MTP joint  No periarticular erosions  No acute fractures or focal lytic/blastic lesion  Thickening of the distal Achilles with insertional enthesopathy  Impression: Soft tissue swelling at the foot  Lucency of the heel suggest large wound  No subjacent bony demineralization to suggest osteomyelitis, though conventional radiographic sensitivity is somewhat limited  Workstation performed: [id number]        Recent Cultures (last 7 days):       Results from last 7 days  Lab Units 05/23/18  1805 05/23/18  1756   BLOOD CULTURE  No Growth at 48 hrs   No Growth at 48 hrs        Last 24 Hours Medication List:     Current Facility-Administered Medications:  acetaminophen 650 mg Oral Q6H PRN Sherif Davis PA-C   aspirin 325 mg Oral Daily Sherif Davis PA-C   atorvastatin 80 mg Oral Daily With [employer], NUNO   docusate sodium 100 mg Oral BID Sherif Davis PA-C   enoxaparin 40 mg Subcutaneous Daily Sherif Davis PA-C   ferrous sulfate 325 mg Oral BID With Meals Sherif Davis PA-C   fludrocortisone 0 1 mg Oral Daily Sherif Davis PA-C   furosemide 20 mg Oral Daily Sherif Davis PA-C   insulin glargine 70 Units Subcutaneous Q12H [address] Sherif Davis PA-C   insulin lispro 1-5 Units Subcutaneous HS Sherif Davis PA-C   insulin lispro 2-12 Units Subcutaneous TID AC Sherif Davis PA-C   metoclopramide 5 mg Oral Daily Sherif Davis PA-C   midodrine 10 mg Oral TID With Meals Sherif Davis PA-C   ondansetron 4 mg Intravenous Q6H PRN Sherif Davis PA-C   polyethylene glycol 17 g Oral Daily PRN Sherif Davis PA-C   tamsulosin 0 4 mg Oral Daily With Dinner Sherif Davis PA-C        Today, Patient Was Seen By: Barbra Gonzales MD    ** Please Note: Dragon 360 Dictation voice to text software may have been used in the creation of this document   **

## 2018-05-26 NOTE — PLAN OF CARE
Problem: Potential for Falls  Goal: Patient will remain free of falls  INTERVENTIONS:  - Assess patient frequently for physical needs  -  Identify cognitive and physical deficits and behaviors that affect risk of falls    -  [place] fall precautions as indicated by assessment   - Educate patient/family on patient safety including physical limitations  - Instruct patient to call for assistance with activity based on assessment  - Modify environment to reduce risk of injury  - Consider OT/PT consult to assist with strengthening/mobility   Outcome: Progressing      Problem: PAIN - ADULT  Goal: Verbalizes/displays adequate comfort level or baseline comfort level  Interventions:  - Encourage patient to monitor pain and request assistance  - Assess pain using appropriate pain scale  - Administer analgesics based on type and severity of pain and evaluate response  - Implement non-pharmacological measures as appropriate and evaluate response  - Consider cultural and social influences on pain and pain management  - Notify physician/advanced practitioner if interventions unsuccessful or patient reports new pain   Outcome: Progressing      Problem: INFECTION - ADULT  Goal: Absence or prevention of progression during hospitalization  INTERVENTIONS:  - Assess and monitor for signs and symptoms of infection  - Monitor lab/diagnostic results  - Monitor all insertion sites, i e  indwelling lines, tubes, and drains  - Monitor endotracheal (as able) and nasal secretions for changes in amount and color  - [place] appropriate cooling/warming therapies per order  - Administer medications as ordered  - Instruct and encourage patient and family to use good hand hygiene technique  - Identify and instruct in appropriate isolation precautions for identified infection/condition   Outcome: Progressing    Goal: Absence of fever/infection during neutropenic period  INTERVENTIONS:  - Monitor WBC  - Implement neutropenic guidelines   Outcome: Progressing      Problem: SAFETY ADULT  Goal: Maintain or return to baseline ADL function  INTERVENTIONS:  -  Assess patient's ability to carry out ADLs; assess patient's baseline for ADL function and identify physical deficits which impact ability to perform ADLs (bathing, care of mouth/teeth, toileting, grooming, dressing, etc )  - Assess/evaluate cause of self-care deficits   - Assess range of motion  - Assess patient's mobility; develop plan if impaired  - Assess patient's need for assistive devices and provide as appropriate  - Encourage maximum independence but intervene and supervise when necessary  ¯ Involve family in performance of ADLs  ¯ Assess for home care needs following discharge   ¯ Request OT consult to assist with ADL evaluation and planning for discharge  ¯ Provide patient education as appropriate   Outcome: Progressing    Goal: Maintain or return mobility status to optimal level  INTERVENTIONS:  - Assess patient's baseline mobility status (ambulation, transfers, stairs, etc )    - Identify cognitive and physical deficits and behaviors that affect mobility  - Identify mobility aids required to assist with transfers and/or ambulation (gait belt, sit-to-stand, lift, walker, cane, etc )  - [place] fall precautions as indicated by assessment  - Record patient progress and toleration of activity level on Mobility SBAR; progress patient to next Phase/Stage  - Instruct patient to call for assistance with activity based on assessment  - Request Rehabilitation consult to assist with strengthening/weightbearing, etc    Outcome: Progressing      Problem: DISCHARGE PLANNING  Goal: Discharge to home or other facility with appropriate resources  INTERVENTIONS:  - Identify barriers to discharge w/patient and caregiver  - Arrange for needed discharge resources and transportation as appropriate  - Identify discharge learning needs (meds, wound care, etc )  - Arrange for interpretive services to assist at discharge as needed  - Refer to Case Management Department for coordinating discharge planning if the patient needs post-hospital services based on physician/advanced practitioner order or complex needs related to functional status, cognitive ability, or social support system   Outcome: Progressing      Problem: Knowledge Deficit  Goal: Patient/family/caregiver demonstrates understanding of disease process, treatment plan, medications, and discharge instructions  Complete learning assessment and assess knowledge base  Interventions:  - Provide teaching at level of understanding  - Provide teaching via preferred learning methods   Outcome: Progressing      Problem: Prexisting or High Potential for Compromised Skin Integrity  Goal: Skin integrity is maintained or improved  INTERVENTIONS:  - Identify patients at risk for skin breakdown  - Assess and monitor skin integrity  - Assess and monitor nutrition and hydration status  - Monitor labs (i e  albumin)  - Assess for incontinence   - Turn and reposition patient  - Assist with mobility/ambulation  - Relieve pressure over bony prominences  - Avoid friction and shearing  - Provide appropriate hygiene as needed including keeping skin clean and dry  - Evaluate need for skin moisturizer/barrier cream  - Collaborate with interdisciplinary team (i e  Nutrition, Rehabilitation, etc )   - Patient/family teaching   Outcome: Progressing      Problem: Nutrition/Hydration-ADULT  Goal: Nutrient/Hydration intake appropriate for improving, restoring or maintaining nutritional needs  Monitor and assess patient's nutrition/hydration status for malnutrition (ex- brittle hair, bruises, dry skin, pale skin and conjunctiva, muscle wasting, smooth red tongue, and disorientation)  Collaborate with interdisciplinary team and initiate plan and interventions as ordered  Monitor patient's weight and dietary intake as ordered or per policy   Utilize nutrition screening tool and intervene per policy  Determine patient's food preferences and provide high-protein, high-caloric foods as appropriate       INTERVENTIONS:  - Monitor oral intake, urinary output, labs, and treatment plans  - Assess nutrition and hydration status and recommend course of action  - Evaluate amount of meals eaten  - Assist patient with eating if necessary   - Allow adequate time for meals  - Recommend/ encourage appropriate diets, oral nutritional supplements, and vitamin/mineral supplements  - Order, calculate, and assess calorie counts as needed  - Recommend, monitor, and adjust tube feedings and TPN/PPN based on assessed needs  - Assess need for intravenous fluids  - Provide specific nutrition/hydration education as appropriate  - Include patient/family/caregiver in decisions related to nutrition   Outcome: Progressing      Problem: DISCHARGE PLANNING - CARE MANAGEMENT  Goal: Discharge to post-acute care or home with appropriate resources  INTERVENTIONS:  - Conduct assessment to determine patient/family and health care team treatment goals, and need for post-acute services based on payer coverage, community resources, and patient preferences, and barriers to discharge  - Address psychosocial, clinical, and financial barriers to discharge as identified in assessment in conjunction with the patient/family and health care team  - Arrange appropriate level of post-acute services according to patients   needs and preference and payer coverage in collaboration with the physician and health care team  - Communicate with and update the patient/family, physician, and health care team regarding progress on the discharge plan  - Arrange appropriate transportation to post-acute venues   Outcome: Progressing

## 2018-05-27 VITALS
HEART RATE: 78 BPM | DIASTOLIC BLOOD PRESSURE: 80 MMHG | WEIGHT: 256.39 LBS | SYSTOLIC BLOOD PRESSURE: 154 MMHG | RESPIRATION RATE: 18 BRPM | OXYGEN SATURATION: 97 % | HEIGHT: 73 IN | TEMPERATURE: 98.3 F | BODY MASS INDEX: 33.98 KG/M2

## 2018-05-27 LAB
ADENOVIRUS: NOT DETECTED
C PNEUM DNA SPEC QL NAA+PROBE: NOT DETECTED
FLUAV H1 RNA SPEC QL NAA+PROBE: NOT DETECTED
FLUAV H3 RNA SPEC QL NAA+PROBE: NOT DETECTED
FLUAV RNA SPEC QL NAA+PROBE: NOT DETECTED
FLUBV RNA SPEC QL NAA+PROBE: NOT DETECTED
GLUCOSE SERPL-MCNC: 112 MG/DL (ref 65–140)
GLUCOSE SERPL-MCNC: 250 MG/DL (ref 65–140)
HBOV DNA SPEC QL NAA+PROBE: NOT DETECTED
HCOV 229E RNA SPEC QL NAA+PROBE: NOT DETECTED
HCOV HKU1 RNA SPEC QL NAA+PROBE: NOT DETECTED
HCOV NL63 RNA SPEC QL NAA+PROBE: NOT DETECTED
HCOV OC43 RNA SPEC QL NAA+PROBE: NOT DETECTED
HPIV1 RNA SPEC QL NAA+PROBE: NOT DETECTED
HPIV2 RNA SPEC QL NAA+PROBE: NOT DETECTED
HPIV3 RNA SPEC QL NAA+PROBE: NOT DETECTED
HPIV4 RNA SPEC QL NAA+PROBE: NOT DETECTED
M PNEUMO DNA SPEC QL NAA+PROBE: NOT DETECTED
METAPNEUMOVIRUS: NOT DETECTED
RHINOVIRUS RNA SPEC QL NAA+PROBE: NOT DETECTED
RSV A RNA SPEC QL NAA+PROBE: NOT DETECTED
RSV B RNA SPEC QL NAA+PROBE: NOT DETECTED

## 2018-05-27 PROCEDURE — 82948 REAGENT STRIP/BLOOD GLUCOSE: CPT

## 2018-05-27 PROCEDURE — 99239 HOSP IP/OBS DSCHRG MGMT >30: CPT | Performed by: INTERNAL MEDICINE

## 2018-05-27 RX ORDER — INSULIN GLARGINE 100 [IU]/ML
70 INJECTION, SOLUTION SUBCUTANEOUS EVERY 12 HOURS SCHEDULED
Qty: 10 ML | Refills: 0 | Status: ON HOLD | OUTPATIENT
Start: 2018-05-27 | End: 2018-08-09

## 2018-05-27 RX ADMIN — FLUDROCORTISONE ACETATE 0.1 MG: 0.1 TABLET ORAL at 11:54

## 2018-05-27 RX ADMIN — INSULIN LISPRO 6 UNITS: 100 INJECTION, SOLUTION INTRAVENOUS; SUBCUTANEOUS at 11:55

## 2018-05-27 RX ADMIN — INSULIN GLARGINE 70 UNITS: 100 INJECTION, SOLUTION SUBCUTANEOUS at 08:56

## 2018-05-27 RX ADMIN — POLYETHYLENE GLYCOL (3350) 17 G: 17 POWDER, FOR SOLUTION ORAL at 08:53

## 2018-05-27 RX ADMIN — METOCLOPRAMIDE HYDROCHLORIDE 5 MG: 10 TABLET ORAL at 08:52

## 2018-05-27 RX ADMIN — FERROUS SULFATE TAB 325 MG (65 MG ELEMENTAL FE) 325 MG: 325 (65 FE) TAB at 08:52

## 2018-05-27 RX ADMIN — ENOXAPARIN SODIUM 40 MG: 40 INJECTION SUBCUTANEOUS at 08:52

## 2018-05-27 RX ADMIN — MIDODRINE HYDROCHLORIDE 10 MG: 5 TABLET ORAL at 12:02

## 2018-05-27 RX ADMIN — MIDODRINE HYDROCHLORIDE 10 MG: 5 TABLET ORAL at 08:52

## 2018-05-27 RX ADMIN — ASPIRIN 325 MG: 325 TABLET ORAL at 08:52

## 2018-05-27 RX ADMIN — FUROSEMIDE 20 MG: 20 TABLET ORAL at 08:56

## 2018-05-27 RX ADMIN — DOCUSATE SODIUM 100 MG: 100 CAPSULE, LIQUID FILLED ORAL at 08:52

## 2018-05-27 NOTE — PLAN OF CARE
Problem: Potential for Falls  Goal: Patient will remain free of falls  INTERVENTIONS:  - Assess patient frequently for physical needs  -  Identify cognitive and physical deficits and behaviors that affect risk of falls    -  [place] fall precautions as indicated by assessment   - Educate patient/family on patient safety including physical limitations  - Instruct patient to call for assistance with activity based on assessment  - Modify environment to reduce risk of injury  - Consider OT/PT consult to assist with strengthening/mobility   Outcome: Progressing      Problem: PAIN - ADULT  Goal: Verbalizes/displays adequate comfort level or baseline comfort level  Interventions:  - Encourage patient to monitor pain and request assistance  - Assess pain using appropriate pain scale  - Administer analgesics based on type and severity of pain and evaluate response  - Implement non-pharmacological measures as appropriate and evaluate response  - Consider cultural and social influences on pain and pain management  - Notify physician/advanced practitioner if interventions unsuccessful or patient reports new pain   Outcome: Progressing      Problem: INFECTION - ADULT  Goal: Absence or prevention of progression during hospitalization  INTERVENTIONS:  - Assess and monitor for signs and symptoms of infection  - Monitor lab/diagnostic results  - Monitor all insertion sites, i e  indwelling lines, tubes, and drains  - Monitor endotracheal (as able) and nasal secretions for changes in amount and color  - [place] appropriate cooling/warming therapies per order  - Administer medications as ordered  - Instruct and encourage patient and family to use good hand hygiene technique  - Identify and instruct in appropriate isolation precautions for identified infection/condition   Outcome: Progressing    Goal: Absence of fever/infection during neutropenic period  INTERVENTIONS:  - Monitor WBC  - Implement neutropenic guidelines   Outcome: Progressing      Problem: SAFETY ADULT  Goal: Maintain or return to baseline ADL function  INTERVENTIONS:  -  Assess patient's ability to carry out ADLs; assess patient's baseline for ADL function and identify physical deficits which impact ability to perform ADLs (bathing, care of mouth/teeth, toileting, grooming, dressing, etc )  - Assess/evaluate cause of self-care deficits   - Assess range of motion  - Assess patient's mobility; develop plan if impaired  - Assess patient's need for assistive devices and provide as appropriate  - Encourage maximum independence but intervene and supervise when necessary  ¯ Involve family in performance of ADLs  ¯ Assess for home care needs following discharge   ¯ Request OT consult to assist with ADL evaluation and planning for discharge  ¯ Provide patient education as appropriate   Outcome: Progressing    Goal: Maintain or return mobility status to optimal level  INTERVENTIONS:  - Assess patient's baseline mobility status (ambulation, transfers, stairs, etc )    - Identify cognitive and physical deficits and behaviors that affect mobility  - Identify mobility aids required to assist with transfers and/or ambulation (gait belt, sit-to-stand, lift, walker, cane, etc )  - [place] fall precautions as indicated by assessment  - Record patient progress and toleration of activity level on Mobility SBAR; progress patient to next Phase/Stage  - Instruct patient to call for assistance with activity based on assessment  - Request Rehabilitation consult to assist with strengthening/weightbearing, etc    Outcome: Progressing      Problem: DISCHARGE PLANNING  Goal: Discharge to home or other facility with appropriate resources  INTERVENTIONS:  - Identify barriers to discharge w/patient and caregiver  - Arrange for needed discharge resources and transportation as appropriate  - Identify discharge learning needs (meds, wound care, etc )  - Arrange for interpretive services to assist at discharge as needed  - Refer to Case Management Department for coordinating discharge planning if the patient needs post-hospital services based on physician/advanced practitioner order or complex needs related to functional status, cognitive ability, or social support system   Outcome: Progressing      Problem: Knowledge Deficit  Goal: Patient/family/caregiver demonstrates understanding of disease process, treatment plan, medications, and discharge instructions  Complete learning assessment and assess knowledge base  Interventions:  - Provide teaching at level of understanding  - Provide teaching via preferred learning methods   Outcome: Progressing      Problem: Prexisting or High Potential for Compromised Skin Integrity  Goal: Skin integrity is maintained or improved  INTERVENTIONS:  - Identify patients at risk for skin breakdown  - Assess and monitor skin integrity  - Assess and monitor nutrition and hydration status  - Monitor labs (i e  albumin)  - Assess for incontinence   - Turn and reposition patient  - Assist with mobility/ambulation  - Relieve pressure over bony prominences  - Avoid friction and shearing  - Provide appropriate hygiene as needed including keeping skin clean and dry  - Evaluate need for skin moisturizer/barrier cream  - Collaborate with interdisciplinary team (i e  Nutrition, Rehabilitation, etc )   - Patient/family teaching   Outcome: Progressing      Problem: Nutrition/Hydration-ADULT  Goal: Nutrient/Hydration intake appropriate for improving, restoring or maintaining nutritional needs  Monitor and assess patient's nutrition/hydration status for malnutrition (ex- brittle hair, bruises, dry skin, pale skin and conjunctiva, muscle wasting, smooth red tongue, and disorientation)  Collaborate with interdisciplinary team and initiate plan and interventions as ordered  Monitor patient's weight and dietary intake as ordered or per policy   Utilize nutrition screening tool and intervene per policy  Determine patient's food preferences and provide high-protein, high-caloric foods as appropriate       INTERVENTIONS:  - Monitor oral intake, urinary output, labs, and treatment plans  - Assess nutrition and hydration status and recommend course of action  - Evaluate amount of meals eaten  - Assist patient with eating if necessary   - Allow adequate time for meals  - Recommend/ encourage appropriate diets, oral nutritional supplements, and vitamin/mineral supplements  - Order, calculate, and assess calorie counts as needed  - Recommend, monitor, and adjust tube feedings and TPN/PPN based on assessed needs  - Assess need for intravenous fluids  - Provide specific nutrition/hydration education as appropriate  - Include patient/family/caregiver in decisions related to nutrition   Outcome: Progressing      Problem: DISCHARGE PLANNING - CARE MANAGEMENT  Goal: Discharge to post-acute care or home with appropriate resources  INTERVENTIONS:  - Conduct assessment to determine patient/family and health care team treatment goals, and need for post-acute services based on payer coverage, community resources, and patient preferences, and barriers to discharge  - Address psychosocial, clinical, and financial barriers to discharge as identified in assessment in conjunction with the patient/family and health care team  - Arrange appropriate level of post-acute services according to patients   needs and preference and payer coverage in collaboration with the physician and health care team  - Communicate with and update the patient/family, physician, and health care team regarding progress on the discharge plan  - Arrange appropriate transportation to post-acute venues   Outcome: Progressing

## 2018-05-27 NOTE — CASE MANAGEMENT
Initial Clinical Review    Admission: Date/Time/Statement: 5/23/18 @ 2007     Orders Placed This Encounter   Procedures    Inpatient Admission (expected length of stay for this patient is greater than two midnights)     Standing Status:   Standing     Number of Occurrences:   1     Order Specific Question:   Admitting Physician     Answer:   Abisai Spivey     Order Specific Question:   Level of Care     Answer:   Med Surg [16]     Order Specific Question:   Estimated length of stay     Answer:   More than 2 Midnights     Order Specific Question:   Certification     Answer:   I certify that inpatient services are medically necessary for this patient for a duration of greater than two midnights  See H&P and MD Progress Notes for additional information about the patient's course of treatment  ED: Date/Time/Mode of Arrival:   ED Arrival Information     Expected Arrival Acuity Means of Arrival Escorted By Service Admission Type    - 5/23/2018 17:45 Emergent Ambulance [facility] General Medicine Emergency    Arrival Complaint    Weakness          Chief Complaint:   Chief Complaint   Patient presents with    Weakness - Generalized     Pt reports generalized weakness and fever since this morning  Pt had cardiac surgery 3 weeks ago, denies CP, SOB  Pt has open wound on L foot, seeing wound care for issue  History of Illness: 60 yo male with PMHx including DM, CAD with recent CABG 3 weeks ago, and chronic L foot ulcer presents to the ED for generalized weakness and fever  Reports he has not been feeling well for the last few days with malaise, myalgias, and fever  His wife took his temperature last night and it was between 102 and 103  No antipyretics today, but does take ASA 81mg daily  Denies abd pain, chest pain, SOB, N/V/D  Reports intermittent dry cough that is not new  States he has had urinary problems for "a while" but has never looked into it   Endorses frequency      Round ulcer ~7cm in diameter on the L heel  Eschar in the base  No surrounding erythema or purulent drainage  Serous drainage noted on bandages that were removed  DDX includes but is not limited to: myocarditis, pericarditis, cellulitis, osteomyelitis, pneumonia, UTI, ACS       ED Vital Signs:   ED Triage Vitals [05/23/18 1752]   Temperature Pulse Respirations Blood Pressure SpO2   99 2 °F (37 3 °C) 97 20 (!) 174/77 93 %      Temp Source Heart Rate Source Patient Position - Orthostatic VS BP Location FiO2 (%)   Oral Monitor Lying Right arm --      Pain Score       No Pain          Wt Readings from Last 1 Encounters:   05/23/18 116 kg (256 lb 6 3 oz)       Abnormal Labs/Diagnostic Test Results:  Trop  0 06,   0 07,  ,   Cl 98,   Glu 142,   T Pro 8 3,   Alb 2 9,   H&H 11 1 / 35 4  Urine:  1+ protein,   Small blood,   Mod bacteria  BC's pendng  CXR: Left posterior basilar airspace consolidation may represent pneumonia  Left Foot  Xray: Soft tissue swelling at the foot   Lucency of the heel suggest large wound  No subjacent bony demineralization to suggest osteomyelitis, though conventional radiographic sensitivity is somewhat limited  EKG: Normal sinus rhythm    ED Treatment:   Medication Administration from 05/23/2018 1745 to 05/23/2018 2118       Date/Time Order Dose Route Action Action by Comments     05/23/2018 1819 acetaminophen (TYLENOL) tablet 650 mg 650 mg Oral Given Lisa May RN      05/23/2018 2059 cefepime (MAXIPIME) 2 g/50 mL dextrose IVPB 0 mg Intravenous Stopped Lisa May RN      05/23/2018 2004 cefepime (MAXIPIME) 2 g/50 mL dextrose IVPB 2,000 mg Intravenous New Bag Lisa May RN           Past Medical/Surgical History:    Active Ambulatory Problems     Diagnosis Date Noted    Anxiety and depression 12/28/2017    Benign essential HTN 01/11/2018    Diabetic neuropathy, type II diabetes mellitus ([facility] ) 11/06/2014    Hyperlipidemia 02/11/2015    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 ([facility] ) 12/28/2017    Vitamin D deficiency 09/20/2016    PAD (peripheral artery disease) ([address] ) 03/14/2018    Elevated troponin 03/14/2018    Non-healing wound of left heel 03/14/2018    Orthostatic hypotension 03/16/2018    Diabetic gastroparesis ([facility] ) 03/19/2018    Bilateral lower extremity edema 03/19/2018    Class 2 obesity due to excess calories with serious comorbidity and body mass index (BMI) of 35 0 to 35 9 in adult 03/19/2018    Triple vessel coronary artery disease 03/23/2018    Acute on chronic diastolic heart failure ([address] ) 03/24/2018    Other constipation 03/26/2018    S/P CABG x 3 03/28/2018    Diabetic autonomic neuropathy associated with type 2 diabetes mellitus ([facility] ) 03/29/2018    Hyponatremia 04/02/2018    Encounter for postoperative care 05/08/2018    Diabetic ulcer of left heel associated with type 2 diabetes mellitus, limited to breakdown of skin ([address] ) 05/10/2018    Healthcare-associated pneumonia 05/23/2018     Resolved Ambulatory Problems     Diagnosis Date Noted    Diabetic peripheral neuropathy ([facility] ) 12/28/2017    Diabetic ulcer of left heel associated with type 2 diabetes mellitus, limited to breakdown of skin ([facility] ) 02/20/2018    Gangrene ([facility] ) 03/14/2018    Dyspnea 03/14/2018    Hyperglycemia 03/14/2018    T wave inversion in EKG 03/14/2018    Atypical chest pain 03/16/2018    Obesity     HTN (hypertension)     Non-ST elevation myocardial infarction (NSTEMI), type 2 03/25/2018    Acute respiratory insufficiency, postoperative 03/28/2018     Past Medical History:   Diagnosis Date    Anemia     Anxiety and depression     Autonomic neuropathy     Cataract     Diabetes mellitus ([address] )     Diabetic autonomic neuropathy associated with type 2 diabetes mellitus ([address] )     Gastroparesis due to DM ([address] )     History of DVT (deep vein thrombosis)     HTN (hypertension)     Hyperlipidemia     Non healing left heel wound  Obesity     Orthostatic hypotension        Admitting Diagnosis: Weakness [R53 1]  Healthcare-associated pneumonia [J18 9]  NSTEMI (non-ST elevated myocardial infarction) ([facility]) [I21 4]  Non-healing wound of left heel [S91 302A]    Age/Sex: 61 y o  male    Assessment/Plan:   Principal Problem:    Healthcare-associated pneumonia  Active Problems:    Hyperlipidemia    Vitamin D deficiency    PAD (peripheral artery disease) ([facility])    Elevated troponin    Non-healing wound of left heel    Orthostatic hypotension    Diabetic gastroparesis ([facility])    S/P CABG x 3    Chronic CHF ([facility] )     Plan for the Primary Problem(s):  · pneumonia  ? Admit to med/surg  Started on vanco/cefepime in ED due to recent hospitalization  Will continue     Plan for Additional Problems:   · Hyperlipidemia- continue lipitor  · PAD- was planning vascular procedure but required CABG first  · Elevated troponin- recent CABG 3/28  Trend troponin  Consult cardiology  · Nonhealing wound left heal- consult wound care and podiatry  · Orthostatic hypotension- on florinef and midodrine  · Diabetic gastroparesis- continue reglan  · Chronic CHF- does not appear to be fluid overloaded  Continue regular home dose of lasix     VTE Prophylaxis: Enoxaparin (Lovenox)  / sequential compression device   Code Status: full code  POLST: There is no POLST form on file for this patient (pre-hospital)  Anticipated Length of Stay:  Patient will be admitted on an Inpatient basis with an anticipated length of stay of  Greater than 2 midnights  Justification for Hospital Stay: patient requires IV antibiotics    CARDIOLOGY CONSULT:  Assessment and Plan:     1  Febrile illness with probable pneumonia (healthcare associated) Primary Dx and reason for admit:  Antibiotics ongoing with management per admitting service  2  Persistent left heel wound with left lower extremity popliteal artery occlusion:  Previous unsuccessful attempt at angioplasty    Recently seen by vascular surgery with plans for repeat angiogram and attempt at angioplasty - presumably next week  If unsuccessful bypass was recommended  3  CAD with recent CABG x4, 3/28/2018: Recently seen by CT surgery on 5/10/2018 at which point he was thought to be doing well  He remained stable at this time  4   Elevated troponin:  Currently with mild elevation with no dynamic change  This is nonspecific and not indicative of any acute coronary syndrome or non STEMI  No further DX/Tx is planned  5  Hypertension: Controlled  6  Dyslipidemia: Remains on statin     PODIATRY CONSULT:Assessment:  1  A 61year old male with Friend 2 ulceration to left plantar heel complicated by #2-6  2  DM, type 2 with neuropathy: A1c = 9 4% (03/14/2018)   4   PAD hx of unsuccessful left popliteal recanalization (DOS: 03/19/2018) - per vascular      Plan:  - wound evaluated at bedside without acute signs of infection, as patient is pending vascular intervention podiatry to continue with [facility] at this time with betadine wet - dry dressing changes and monitor wound closely  - spoke with Iman from vascular personally and she says for the time being to keep the appointment on 05/29 he has for the repeat arteriogram with attempt at recanalization of left popliteal artery occlusion, however, if not discharged by end of the weekend, will attempt to schedule agram inhouse with surgeon; pending recommendations after intervention possible wound debridement to be performed   - LEADs ordered per SLIM; follow up results   - xrays reviewed: soft tissue swelling, lucency to heel where ulceration is present; no OM noted at this time   - WBS: [facility] in [facility] shoe to left, SS to right; PT/OT onboard        PROGRESS NOTE 5/25:  Assessment:     Principal Problem:    Healthcare-associated pneumonia  Active Problems:    Hyperlipidemia    Vitamin D deficiency    PAD (peripheral artery disease) (HCC)    Elevated troponin    Non-healing wound of left heel    Orthostatic hypotension    Diabetic gastroparesis (HCC)    S/P CABG x 3    Chronic CHF ([facility] )        Plan:     · Healthcare associated pneumonia after recent course of rehab post CABG more recently and diabetic foot ulcer presents with fever and cough  Chest x-ray showing left basilar pneumonia presumably healthcare acquired will treat with cefepime and vancomycin  Not producing sputum but essential we also do pulmonary toilet given recent CABG  Lactic acid was within normal limits will get Infectious Disease to see pro calcitonin pending remains febrile after 1st 24 hr of IV antibiotics   · Peripheral arterial disease with left heel diabetic foot ulcer  undergoing ongoing wound care at rehab w/ podiatry to follow while here/x-ray did not show any signs of osteomyelitis with schedule have arteriogram of left lower extremity at [facility] March 29th and would consult vascular team if is still here next week to have done here  · Elevated troponin may be in relation to recent CABG will trend seen by Cardiology feel troponins nonspecific  · Diabetes mellitus/diabetic gastroparesis continue sliding scale coverage and Lantus 70 units q 12continue Reglan  · Hyperlipidemia continue atorvastatin 80 mg  · Recent CABG 3 vessel continue aspirin continue statin        VTE Pharmacologic Prophylaxis:   Pharmacologic: Enoxaparin (Lovenox)  Mechanical VTE Prophylaxis in Place: Yes       CONSULT ID:  IMPRESSION & RECOMMENDATIONS:   Impression/Recommendations:  1  Fever  Unclear etiology  Consider viral syndrome given overall malaise  Review of systems and exam benign  WBC, LFTs normal   UA, blood cultures negative  Chest x-ray more suggestive of effusion and patient denies cough making pneumonia unlikely  Appears clinically stable and nontoxic  Rec:  ? Continue antibiotics as below  ? Check procalcitonin to help guide if antibiotics need to continue  ? Follow temperatures closely  ? Supportive care as per the primary service  ?  If fevers continue without obvious source consider CT C/A/P with IV/PO contrast     2  Possible pneumonia  Patient admitted with lack of respiratory symptoms  Chest x-ray suggests more left basilar effusion than infiltrate  Rec:  ? Continue vancomycin and cefepime for now  ? Check vanco trough with PM dose  ? Check procalcitonin as above  ? Check MRSA nasal swab  ? Follow respiratory symptoms closely     3  Chronic left heel ulcer  No evidence of active infection  X-ray negative for osteomyelitis  Rec:  ? Continue serial exams and [address] per podiatry     4  Severe PAD  Eventual outpatient repeat arteriogram when clinically stable     5  Poorly controlled diabetes  A1c = 9 4 on 3/14/18     Antibiotics:  Cefepime/vancomycin # 3    PROGRESS NOTE 5/26:    Assessment:     Principal Problem:    Healthcare-associated pneumonia  Active Problems:    Hyperlipidemia    Vitamin D deficiency    PAD (peripheral artery disease) (HCC)    Elevated troponin    Non-healing wound of left heel    Orthostatic hypotension    Diabetic gastroparesis (HCC)    S/P CABG x 3    Chronic CHF ([facility] )        Plan:     · Febrile illness after recent course of rehab post CABG  and diabetic foot ulcer presents with fever and cough  No significant leukocytosis  Chest x-ray showing left basilar pneumonia versus effusion atelectasis presumably healthcare acquired was treat with cefepime and vancomycin  Was treated with initial course of antibiotics however procalcitonin level is normal and antibiotics discontinued awaiting respiratory viral panel    Had only low-grade temperature last night if remains afebrile in next 24 hours would discharge so can have follow-up arteriogram at [facility] of scheduled on the 29th  · Peripheral arterial disease with left heel diabetic foot ulcer  undergoing ongoing wound care at rehab w/ podiatry to follow while here/x-ray did not show any signs of osteomyelitis with schedule have arteriogram of left lower extremity at [facility] March 29th and would consult vascular team if is still here next week to have done here  · Elevated troponin may be in relation to recent CABG will trend seen by Cardiology feel troponins nonspecific  · Diabetes mellitus/diabetic gastroparesis continue sliding scale coverage and Lantus 70 units q 12continue Reglan  · Hyperlipidemia continue atorvastatin 80 mg  · Recent CABG 3 vessel continue aspirin continue statin        VTE Pharmacologic Prophylaxis:   Pharmacologic: Enoxaparin (Lovenox)  Mechanical VTE Prophylaxis in Place:  Yes       Admission Orders:    Consult Podiatry  Consult Wound CAre  PT / OT Eval  SCD's  CBC,  BMP  Consult Cardio  LLE Duplex  Repeat trop 0 07  Scheduled Meds:   Current Facility-Administered Medications:          aspirin 325 mg Oral Daily [facility], PA-JESSICA    atorvastatin 80 mg Oral Daily With [employer], NUNO    cefepime 2,000 mg Intravenous Q12H Claudette NUNO [place] Last Rate: Stopped (05/24/18 0920)   docusate sodium 100 mg Oral BID [facility], PA-C    enoxaparin 40 mg Subcutaneous Daily [facility], PA-C    ferrous sulfate 325 mg Oral BID With Meals [facility], PA-C    fludrocortisone 0 1 mg Oral Daily [facility], PA-C    furosemide 20 mg Oral Daily [facility], PA-C    insulin glargine 70 Units Subcutaneous Q12H [facility], PA-C    insulin lispro 1-5 Units Subcutaneous HS [facility], PA-C    insulin lispro 2-12 Units Subcutaneous TID [facility], PA-C    metoclopramide 5 mg Oral Daily [facility], PA-C    midodrine 10 mg Oral TID With Meals [facility], PA-C                    tamsulosin 0 4 mg Oral Daily With [employer], PA-C    vancomycin 15 mg/kg (Adjusted) Intravenous Q12H Claudette Wong, NUNO Last Rate: 1,500 mg (05/24/18 0939)     Continuous Infusions:    PRN Meds:   acetaminophen    ondansetron    polyethylene glycol    5/24: 98 3 - 68 - 18   145/73   RA 97%  K 3 2,   H&H 10 0 / 31 9  Thank you,  [facility] in the [facility] for 2017  Network Utilization Review Department  Phone: [phone]; Fax [phone]  ATTENTION: The Network Utilization Review Department is now centralized for our 7 Facilities  Make a note that we have a new phone and fax numbers for our Department  Please call with any questions or concerns to [phone] and carefully follow the prompts so that you are directed to the right person  All voicemails are confidential  Fax any determinations, approvals, denials, and requests for initial or continue stay review clinical to [phone]  Due to HIGH CALL volume, it would be easier if you could please send faxed requests to expedite your requests and in part, help us provide discharge notifications faster

## 2018-05-27 NOTE — DISCHARGE SUMMARY
[facility] - [facility]    Patient Information: Shay Duron 61 y o  male MRN: [id number]  Unit/Bed#: E4 -01 Encounter: [id number]    Discharging Physician / Practitioner: She Quick MD  PCP: Trevon Montero DO  Admission Date: 5/23/2018  Discharge Date: 05/27/18    Reason for Admission:  Febrile illness    Discharge Diagnoses:  Febrile illness presumed viral    Principal Problem:    Febrile illness, acute  Active Problems:    Hyperlipidemia    Vitamin D deficiency    PAD (peripheral artery disease) (HCC)    Elevated troponin    Non-healing wound of left heel    Orthostatic hypotension    Diabetic gastroparesis (HCC)    S/P CABG x 3    Chronic CHF ([address] )  Resolved Problems:    * No resolved hospital problems  *      Consultations During Hospital Stay:  · Infectious Disease, Podiatry, vascular surgery    Procedures Performed:     Xr Chest 2 Views    Result Date: 5/23/2018  Narrative: CHEST INDICATION:   fever  COMPARISON:  3/29/2018 EXAM PERFORMED/VIEWS:  XR CHEST PA & LATERAL FINDINGS: Interval removal of left chest tube and right IJ venous catheter  Postsurgical change from median sternotomy  Mild cardiomegaly  Left posterior basilar airspace consolidation  No pneumothorax or pleural effusion  Postsurgical change in the left shoulder  Chronic right posterior 7th rib fracture  Impression: Left posterior basilar airspace consolidation may represent pneumonia  Workstation performed: [id number]     Xr Foot 3+ Vw Left    Result Date: 5/23/2018  Narrative: LEFT FOOT INDICATION:   Concern for osteomyelitis  Technologist reports the patient has a heel wound and redness and warmth of the lower extremity as well as fever  COMPARISON:  3/14/2018 VIEWS:  XR FOOT 3+ VW LEFT FINDINGS: Diffuse soft tissue swelling at the foot  Soft tissue lucency at the plantar aspect of the heel suggest large soft tissue wound  No subjacent osseous demineralization  No radiopaque foreign body   Articular alignment is maintained  No dislocation  Mild degenerative changes in the 1st MTP joint  No periarticular erosions  No acute fractures or focal lytic/blastic lesion  Thickening of the distal Achilles with insertional enthesopathy  Impression: Soft tissue swelling at the foot  Lucency of the heel suggest large wound  No subjacent bony demineralization to suggest osteomyelitis, though conventional radiographic sensitivity is somewhat limited  Workstation performed: [id number]     Vas Lower Limb Arterial Duplex, Limited/unilateral    Result Date: 5/24/2018  Narrative:  [facility] REPORT CLINICAL: Indications: Patient presents with an ulcer on his left foot  Risk Factors: The patient has history of Hyperlipidemia, obesity, Diabetes, CAD and Hypertension  Previous remote smoker  Operative History No prior vascular surgery  Right Brachial Pressure: IV, Left Brachial Pressure:  130 mmHg  FINDINGS:  Left                   Impression  PSV  EDV  Common Femoral Artery  Normal      108    0  Prox Profunda                      111   10  Prox SFA                           144   23  Mid SFA                            152   16  Dist SFA                           126   23  Proximal Pop                       142   35  Distal Pop             >75%        511  156  Dist Post Tibial                   128   48  Dist  Ant  Tibial                   76   27     CONCLUSION: Impression: Right Lower Limb Ankle Pressure: 172  mm Hg , YUDITH: 1 32  Normal range  Previous study 1 31  Right Metatarsal Pressure: 162  mm Hg  Right Great Toe Pressure 97 mm Hg  Above healing threshold for diabetic  Previous study 108 mm Hg  Left Lower Limb Ankle Pressure 123 mm Hg , YUDITH: 0 95  Normal range  However, may be unreliable due to poorly compressible vessels  Previous study supra-normal  Left Metatarsal Pressure: 67  mm Hg Left Great Toe Pressure 62 mm Hg  Above healing threshold for diabetic  Previous study 51 mm Hg   Conclusion: A greater than 75% stenosis is noted in the distal popliteal artery with diffuse atherosclerotic disease noted throughout the remaining portions of the femoropopliteal arteries  Findings suggest tibio-peroneal disease  In comparison to the study of 03/06/2018, there is no significant interval change in the disease process on the right  Mild progression of disease in the left  SIGNATURE: Electronically Signed by: Narayan Wilkes MD, [address] on 2018-05-24 11:42:49 PM        Significant Findings:     · Patient is a 58-year-old male who had a recent coronary artery bypass and underwent a rehab course and went home he had ongoing therapy for a diabetic foot ulcer with [facility] wound care and had a pending appointment for arteriogram in relation to peripheral artery disease involving the left leg scheduled for May 29th at [place]  He developed a febrile course associated with cough prompting evaluation here with chest x-ray suggestive of a back left basilar pneumonia versus atelectasis given his febrile course there was concern for a healthcare acquired pneumonia and he was admitted and placed on empiric antibiotic coverage  · He remained febrile in the 1st 48 hr of his care in spite of empiric antibiotic coverage and a procalcitonin level was only measured at 0 3 and later that day normalized  Thusly IV antibiotics were discontinued in favor of diagnosis now of a viral illness  He was seen by the Infectious Disease service who concurred/he was seen by the podiatry service in regards to concern over his febrile illness being affected by his wound in his left foot  · Infectious Disease impression of febrile illness was unclear etiology but to consider viral syndrome and chest x-ray more suggestive of effusion versus pneumonia the also favored continuation of vancomycin cefepime until pro calcitonin levels came back which supported by either levels eventually discontinuation of antibiotics after 3rd day    They felt if fevers continued CT chest abdomen pelvis but be warranted but the patient defervesced on 3rd day  · A viral respiratory pathogen panel was completely negative though viral syndrome still possible febrile illness etiology unclear at time of discharge  · Podiatry impression and was [place] Lent 2 ulceration of the left plantar heel complicated by peripheral vascular disease and unsuccessful left popliteal recanalization on the 3/19/2018 by vascular  They felt that the wound itself was not infected and continued treatment should be with weight-bearing as tolerated in [facility] should you on the left andSS to right  They felt that the patient should proceed with arteriography at [place] if medically stable  · Lower extremity arterial Doppler continues show a 75% stenosis of the distal popliteal on the left lower extremity with mild progression from previous study  · Is my opinion that the patient warranted inpatient hospitalization based on febrile illness in the possibility of possible healthcare acquired pneumonia in the setting of recent CABG and ongoing wound care of a diabetic foot ulcer in the setting of arterial insufficiency that still needs intervention  Incidental Findings:   · Viral respiratory pathogen panel negative  · Arterial Doppler left lower extremity noting 75% distal popliteal stenosis    Test Results Pending at Discharge (will require follow up): · None     Outpatient Tests Requested:  · Has pending arteriogram scheduled March 29th at [place] and should be able to complete that appointment without medical contraindication at this point    Complications:  None    Hospital Course:     Stuart Andrade is a 61 y o  male patient who originally presented to the hospital on 5/23/2018 due to * febrile illness    Please see above significant findings for hospital course and treatment plan    Condition at Discharge: good     Discharge Day Visit / Exam:     * Please refer to separate progress for these details *    Discharge instructions/Information to patient and family:   See after visit summary for information provided to patient and family  Provisions for Follow-Up Care:  See after visit summary for information related to follow-up care and any pertinent home health orders  Disposition:     Home with [facility] Services (Reminder: Complete face to face encounter)    For Discharges to [address] [facility]:   · Not Applicable to this Patient - Not Applicable to this Patient      Discharge Statement:  I spent 56 minutes discharging the patient  This time was spent on the day of discharge  I had direct contact with the patient on the day of discharge  Greater than 50% of the total time was spent examining patient, answering all patient questions, arranging and discussing plan of care with patient as well as directly providing post-discharge instructions  Additional time then spent on discharge activities  Discharge Medications:  See after visit summary for reconciled discharge medications provided to patient and family  ** Please Note: Dragon 360 Dictation voice to text software may have been used in the creation of this document   **

## 2018-05-28 LAB
BACTERIA BLD CULT: NORMAL
BACTERIA BLD CULT: NORMAL

## 2018-05-29 ENCOUNTER — TRANSITIONAL CARE MANAGEMENT (OUTPATIENT)
Dept: FAMILY MEDICINE CLINIC | Facility: CLINIC | Age: 58
End: 2018-05-29

## 2018-05-30 ENCOUNTER — TELEPHONE (OUTPATIENT)
Dept: FAMILY MEDICINE CLINIC | Facility: CLINIC | Age: 58
End: 2018-05-30

## 2018-05-30 NOTE — TELEPHONE ENCOUNTER
I agree    Make sure that patient has a follow-up visit to discuss his medicines and how he is doing

## 2018-05-30 NOTE — TELEPHONE ENCOUNTER
Visiting Nurse called in today and stated that Mr Nora Barrett has not been taking the following medications prescribed  Levantine 20 units @ bedtime, Zofran, Pantoprazole, and Miralax  Nurse also stated that Mr Nora Barrett should come in for a visit to go over his medications

## 2018-05-31 ENCOUNTER — PATIENT OUTREACH (OUTPATIENT)
Dept: FAMILY MEDICINE CLINIC | Facility: CLINIC | Age: 58
End: 2018-05-31

## 2018-05-31 NOTE — PROGRESS NOTES
Lyndsey Lamar is managing well post hospitalization  His angiogram was rescheduled for 6/7  He is receiving wound care through [facility] every other day  He goes to [facility] for appointment 6/5  He is wearing darco shoe to L foot  He denies any respiratory difficulties, fever, chills  BS are 120-150  Nutritional intake adequate  He denies needing any additional caregiver assistance at home  F/U appointments scheduled  He is agreeable to further outreach

## 2018-06-01 ENCOUNTER — TELEPHONE (OUTPATIENT)
Dept: RADIOLOGY | Facility: HOSPITAL | Age: 58
End: 2018-06-01

## 2018-06-01 RX ORDER — SODIUM CHLORIDE 9 MG/ML
75 INJECTION, SOLUTION INTRAVENOUS CONTINUOUS
Status: CANCELLED | OUTPATIENT
Start: 2018-06-01

## 2018-06-05 ENCOUNTER — APPOINTMENT (OUTPATIENT)
Dept: WOUND CARE | Facility: HOSPITAL | Age: 58
End: 2018-06-05
Payer: COMMERCIAL

## 2018-06-05 PROCEDURE — 11042 DBRDMT SUBQ TIS 1ST 20SQCM/<: CPT | Performed by: PODIATRIST

## 2018-06-05 PROCEDURE — 11045 DBRDMT SUBQ TISS EACH ADDL: CPT | Performed by: PODIATRIST

## 2018-06-06 ENCOUNTER — TELEPHONE (OUTPATIENT)
Dept: INPATIENT UNIT | Facility: HOSPITAL | Age: 58
End: 2018-06-06

## 2018-06-07 ENCOUNTER — HOSPITAL ENCOUNTER (OUTPATIENT)
Dept: RADIOLOGY | Facility: HOSPITAL | Age: 58
Discharge: HOME | End: 2018-06-07
Attending: SURGERY | Admitting: RADIOLOGY
Payer: COMMERCIAL

## 2018-06-07 VITALS
DIASTOLIC BLOOD PRESSURE: 71 MMHG | RESPIRATION RATE: 18 BRPM | HEART RATE: 76 BPM | WEIGHT: 255 LBS | OXYGEN SATURATION: 96 % | HEIGHT: 73 IN | BODY MASS INDEX: 33.8 KG/M2 | SYSTOLIC BLOOD PRESSURE: 134 MMHG | TEMPERATURE: 97.6 F

## 2018-06-07 DIAGNOSIS — I73.9 PERIPHERAL VASCULAR DISEASE, UNSPECIFIED (HCC): ICD-10-CM

## 2018-06-07 LAB — GLUCOSE SERPL-MCNC: 145 MG/DL (ref 65–140)

## 2018-06-07 PROCEDURE — C1769 GUIDE WIRE: HCPCS

## 2018-06-07 PROCEDURE — C1760 CLOSURE DEV, VASC: HCPCS

## 2018-06-07 PROCEDURE — C1894 INTRO/SHEATH, NON-LASER: HCPCS

## 2018-06-07 PROCEDURE — 99152 MOD SED SAME PHYS/QHP 5/>YRS: CPT | Performed by: SURGERY

## 2018-06-07 PROCEDURE — 37224 HB FEM/POPL REVAS W/TLA: CPT

## 2018-06-07 PROCEDURE — 99153 MOD SED SAME PHYS/QHP EA: CPT

## 2018-06-07 PROCEDURE — 37228 PR REVASCULARIZE TIBIAL/PERON ARTERY,ANGIOPLASTY INITIAL: CPT | Performed by: SURGERY

## 2018-06-07 PROCEDURE — 99152 MOD SED SAME PHYS/QHP 5/>YRS: CPT

## 2018-06-07 PROCEDURE — 37224 PR REVSC OPN/PRG FEM/POP W/ANGIOPLASTY UNI: CPT | Performed by: SURGERY

## 2018-06-07 PROCEDURE — 75710 ARTERY X-RAYS ARM/LEG: CPT

## 2018-06-07 PROCEDURE — C2623 CATH, TRANSLUMIN, DRUG-COAT: HCPCS

## 2018-06-07 PROCEDURE — C1725 CATH, TRANSLUMIN NON-LASER: HCPCS

## 2018-06-07 PROCEDURE — 82948 REAGENT STRIP/BLOOD GLUCOSE: CPT

## 2018-06-07 RX ORDER — CLOPIDOGREL BISULFATE 75 MG/1
75 TABLET ORAL ONCE
Status: DISCONTINUED | OUTPATIENT
Start: 2018-06-07 | End: 2018-06-07 | Stop reason: HOSPADM

## 2018-06-07 RX ORDER — FENTANYL CITRATE 50 UG/ML
INJECTION, SOLUTION INTRAMUSCULAR; INTRAVENOUS CODE/TRAUMA/SEDATION MEDICATION
Status: COMPLETED | OUTPATIENT
Start: 2018-06-07 | End: 2018-06-07

## 2018-06-07 RX ORDER — SODIUM CHLORIDE 9 MG/ML
75 INJECTION, SOLUTION INTRAVENOUS CONTINUOUS
Status: DISCONTINUED | OUTPATIENT
Start: 2018-06-07 | End: 2018-06-07 | Stop reason: HOSPADM

## 2018-06-07 RX ORDER — MIDAZOLAM HYDROCHLORIDE 1 MG/ML
INJECTION INTRAMUSCULAR; INTRAVENOUS CODE/TRAUMA/SEDATION MEDICATION
Status: COMPLETED | OUTPATIENT
Start: 2018-06-07 | End: 2018-06-07

## 2018-06-07 RX ORDER — OXYCODONE HYDROCHLORIDE AND ACETAMINOPHEN 5; 325 MG/1; MG/1
1 TABLET ORAL EVERY 4 HOURS PRN
Status: DISCONTINUED | OUTPATIENT
Start: 2018-06-07 | End: 2018-06-07 | Stop reason: HOSPADM

## 2018-06-07 RX ORDER — HEPARIN SODIUM 1000 [USP'U]/ML
INJECTION, SOLUTION INTRAVENOUS; SUBCUTANEOUS CODE/TRAUMA/SEDATION MEDICATION
Status: COMPLETED | OUTPATIENT
Start: 2018-06-07 | End: 2018-06-07

## 2018-06-07 RX ADMIN — FENTANYL CITRATE 25 MCG: 50 INJECTION, SOLUTION INTRAMUSCULAR; INTRAVENOUS at 08:48

## 2018-06-07 RX ADMIN — FENTANYL CITRATE 50 MCG: 50 INJECTION, SOLUTION INTRAMUSCULAR; INTRAVENOUS at 08:30

## 2018-06-07 RX ADMIN — FENTANYL CITRATE 25 MCG: 50 INJECTION, SOLUTION INTRAMUSCULAR; INTRAVENOUS at 08:45

## 2018-06-07 RX ADMIN — SODIUM CHLORIDE 75 ML/HR: 0.9 INJECTION, SOLUTION INTRAVENOUS at 07:12

## 2018-06-07 RX ADMIN — MIDAZOLAM 0.5 MG: 1 INJECTION INTRAMUSCULAR; INTRAVENOUS at 08:59

## 2018-06-07 RX ADMIN — FENTANYL CITRATE 50 MCG: 50 INJECTION, SOLUTION INTRAMUSCULAR; INTRAVENOUS at 08:59

## 2018-06-07 RX ADMIN — IODIXANOL 90 ML: 320 INJECTION, SOLUTION INTRAVASCULAR at 09:28

## 2018-06-07 RX ADMIN — FENTANYL CITRATE 50 MCG: 50 INJECTION, SOLUTION INTRAMUSCULAR; INTRAVENOUS at 07:58

## 2018-06-07 RX ADMIN — HEPARIN SODIUM 6000 UNITS: 1000 INJECTION INTRAVENOUS; SUBCUTANEOUS at 08:16

## 2018-06-07 RX ADMIN — MIDAZOLAM 1.5 MG: 1 INJECTION INTRAMUSCULAR; INTRAVENOUS at 08:18

## 2018-06-07 RX ADMIN — MIDAZOLAM 2 MG: 1 INJECTION INTRAMUSCULAR; INTRAVENOUS at 07:58

## 2018-06-07 RX ADMIN — FENTANYL CITRATE 50 MCG: 50 INJECTION, SOLUTION INTRAMUSCULAR; INTRAVENOUS at 08:12

## 2018-06-07 NOTE — DISCHARGE INSTRUCTIONS
ARTERIOGRAM    WHAT YOU SHOULD KNOW:   An angiogram is a procedure to look at arteries in your body  Arteries are the blood vessels that carry blood from your heart to your body  AFTER YOU LEAVE:     Self-care:   · Limit activity: Rest for the remainder of the day of your procedure  Have some one with you until the next morning  Keep your arm or leg straight as much as possible  Rest as much as possible, sitting lying or reclining  Walk only to go to the bathroom, to bed or to eat  If the angiogram catheter was put in your leg, use the stairs as little as possible  No driving  · Keep your wound clean and dry  You may shower 24 hours after your procedure  The bandage you have on should fall off in 2-3 days  If there is any drainage from the puncture site, you should put on a clean bandage  · Watch for bleeding and bruising: It is normal to have a bruise and soreness where the angiogram catheter went in  · Diet:   · You may resume your regular diet, Sips of flat soda will help with mild nausea  · Drink more liquids than usual for the next 24 hours      · IMMEDIATELY Contact Interventional Radiology at [phone] Liam PATIENTS: Contact Interventional Radiology at [phone]) Jluis Flor PATIENTS: Contact Interventional Radiology at [phone]) if any of the following occur:  · If your bruise gets larger or if you notice any active bleeding  APPLY DIRECT PRESSURE TO THE BLEEDING SITE  · If you notice increased swelling or have increased pain at the puncture site   · If you have any numbness or pain in the extremity of the puncture site   · If that extremity seems cold or pale      · You have fever greater than 101  · Persistent nausea or vomitting    Follow up with your primary healthcare provider  as directed: Write down your questions so you remember to ask them during your visits

## 2018-06-07 NOTE — BRIEF OP NOTE (RAD/CATH)
IR ABDOMINAL ANGIOGRAPHY / INTERVENTION  Procedure Note    PATIENT NAME: Yvonne Aguilar  : 1960  MRN: [id number]     Pre-op Diagnosis:   1  Peripheral vascular disease, unspecified ([facility] )      Post-op Diagnosis:   1   Peripheral vascular disease, unspecified ([facility] )      Procedure: left lower extremity arteriogram with successful recanalization of popliteal artery occlusion  Surgeon:   Silvio Wilson DO  Assistants:     No qualified resident was available, Resident is only observing    Estimated Blood Loss: <10ml  Findings:   Successful recanalization of occluded popliteal artery  4 x 8 estelita balloon  4x 8 lutonix  5x 6 lutonix  3 5 x 4 PTA tibioperoneal trunk angioplasty      Specimens: N/A    Complications:  None immediately apparent    Anesthesia: Conscious sedation and Local    Ana Patten DO     Date: 2018  Time: 9:19 AM

## 2018-06-12 ENCOUNTER — APPOINTMENT (OUTPATIENT)
Dept: WOUND CARE | Facility: HOSPITAL | Age: 58
End: 2018-06-12
Payer: COMMERCIAL

## 2018-06-12 ENCOUNTER — OFFICE VISIT (OUTPATIENT)
Dept: FAMILY MEDICINE CLINIC | Facility: CLINIC | Age: 58
End: 2018-06-12
Payer: COMMERCIAL

## 2018-06-12 VITALS — DIASTOLIC BLOOD PRESSURE: 70 MMHG | SYSTOLIC BLOOD PRESSURE: 128 MMHG | TEMPERATURE: 100.3 F

## 2018-06-12 DIAGNOSIS — Z95.1 S/P CABG X 3: ICD-10-CM

## 2018-06-12 DIAGNOSIS — Z79.4 TYPE 2 DIABETES MELLITUS WITH CHRONIC KIDNEY DISEASE, WITH LONG-TERM CURRENT USE OF INSULIN, UNSPECIFIED CKD STAGE (HCC): Primary | ICD-10-CM

## 2018-06-12 DIAGNOSIS — Z79.4 TYPE 2 DIABETES MELLITUS WITH DIABETIC NEUROPATHY, WITH LONG-TERM CURRENT USE OF INSULIN (HCC): ICD-10-CM

## 2018-06-12 DIAGNOSIS — I50.22 CHRONIC SYSTOLIC CONGESTIVE HEART FAILURE (HCC): ICD-10-CM

## 2018-06-12 DIAGNOSIS — E11.40 TYPE 2 DIABETES MELLITUS WITH DIABETIC NEUROPATHY, WITH LONG-TERM CURRENT USE OF INSULIN (HCC): ICD-10-CM

## 2018-06-12 DIAGNOSIS — I73.9 PAD (PERIPHERAL ARTERY DISEASE) (HCC): ICD-10-CM

## 2018-06-12 DIAGNOSIS — A49.9 BACTERIAL INFECTION: ICD-10-CM

## 2018-06-12 DIAGNOSIS — S91.302A NON-HEALING WOUND OF LEFT HEEL: ICD-10-CM

## 2018-06-12 DIAGNOSIS — Z95.1 S/P CABG (CORONARY ARTERY BYPASS GRAFT): ICD-10-CM

## 2018-06-12 DIAGNOSIS — I25.10 TRIPLE VESSEL CORONARY ARTERY DISEASE: ICD-10-CM

## 2018-06-12 DIAGNOSIS — E11.22 TYPE 2 DIABETES MELLITUS WITH CHRONIC KIDNEY DISEASE, WITH LONG-TERM CURRENT USE OF INSULIN, UNSPECIFIED CKD STAGE (HCC): Primary | ICD-10-CM

## 2018-06-12 DIAGNOSIS — E11.8 TYPE 2 DIABETES MELLITUS WITH COMPLICATION, UNSPECIFIED WHETHER LONG TERM INSULIN USE: Primary | ICD-10-CM

## 2018-06-12 PROBLEM — R50.82 POST-PROCEDURAL FEVER: Status: ACTIVE | Noted: 2018-06-12

## 2018-06-12 PROCEDURE — 11042 DBRDMT SUBQ TIS 1ST 20SQCM/<: CPT | Performed by: PODIATRIST

## 2018-06-12 PROCEDURE — 99495 TRANSJ CARE MGMT MOD F2F 14D: CPT | Performed by: FAMILY MEDICINE

## 2018-06-12 PROCEDURE — 11045 DBRDMT SUBQ TISS EACH ADDL: CPT | Performed by: PODIATRIST

## 2018-06-12 RX ORDER — FERROUS SULFATE 325(65) MG
325 TABLET ORAL 2 TIMES DAILY WITH MEALS
Qty: 30 TABLET | Refills: 1 | Status: SHIPPED | OUTPATIENT
Start: 2018-06-12 | End: 2018-12-12 | Stop reason: SDUPTHER

## 2018-06-12 RX ORDER — FLUDROCORTISONE ACETATE 0.1 MG/1
0.1 TABLET ORAL DAILY
Qty: 30 TABLET | Refills: 1 | Status: SHIPPED | OUTPATIENT
Start: 2018-06-12 | End: 2018-12-12

## 2018-06-12 RX ORDER — METOCLOPRAMIDE 5 MG/1
5 TABLET ORAL DAILY
Qty: 30 TABLET | Refills: 1 | Status: SHIPPED | OUTPATIENT
Start: 2018-06-12 | End: 2018-12-12 | Stop reason: SDUPTHER

## 2018-06-12 RX ORDER — CLOPIDOGREL BISULFATE 75 MG/1
75 TABLET ORAL DAILY
COMMUNITY
Start: 2018-06-07 | End: 2018-12-12 | Stop reason: SDUPTHER

## 2018-06-12 RX ORDER — ATORVASTATIN CALCIUM 80 MG/1
80 TABLET, FILM COATED ORAL
Qty: 30 TABLET | Refills: 1
Start: 2018-06-12 | End: 2018-07-02 | Stop reason: SDUPTHER

## 2018-06-12 RX ORDER — FUROSEMIDE 20 MG/1
20 TABLET ORAL DAILY
Qty: 30 TABLET | Refills: 1 | Status: SHIPPED | OUTPATIENT
Start: 2018-06-12 | End: 2018-12-12

## 2018-06-12 RX ORDER — LEVOFLOXACIN 500 MG/1
500 TABLET, FILM COATED ORAL EVERY 24 HOURS
Qty: 10 TABLET | Refills: 0 | Status: SHIPPED | OUTPATIENT
Start: 2018-06-12 | End: 2018-06-22

## 2018-06-12 RX ORDER — DOCUSATE SODIUM 100 MG/1
100 CAPSULE, LIQUID FILLED ORAL 2 TIMES DAILY
Qty: 60 CAPSULE | Refills: 1 | Status: SHIPPED | OUTPATIENT
Start: 2018-06-12 | End: 2018-12-12 | Stop reason: SDUPTHER

## 2018-06-12 RX ORDER — TAMSULOSIN HYDROCHLORIDE 0.4 MG/1
0.4 CAPSULE ORAL 2 TIMES DAILY
Qty: 60 CAPSULE | Refills: 1 | Status: SHIPPED | OUTPATIENT
Start: 2018-06-12 | End: 2018-09-21 | Stop reason: SDUPTHER

## 2018-06-12 NOTE — PROGRESS NOTES
Assessment/Plan:   patient had fever questionable bacterial versus viral etiology  Patient in agreement to trying antibiotic to help with possible bacterial infection  Patient will follow up with all specialists for chronic conditions listed  Diagnoses and all orders for this visit:    Type 2 diabetes mellitus with complication, unspecified whether long term insulin use (HCC)  -     POCT blood glucose    S/P CABG (coronary artery bypass graft)  -     tamsulosin (FLOMAX) 0 4 mg; Take 1 capsule (0 4 mg total) by mouth 2 (two) times a day  -     docusate sodium (COLACE) 100 mg capsule; Take 1 capsule (100 mg total) by mouth 2 (two) times a day  -     atorvastatin (LIPITOR) 80 mg tablet; Take 1 tablet (80 mg total) by mouth daily with dinner    Triple vessel coronary artery disease    Chronic systolic congestive heart failure (HCC)  -     ferrous sulfate 325 (65 Fe) mg tablet; Take 1 tablet (325 mg total) by mouth 2 (two) times a day with meals  -     metoclopramide (REGLAN) 5 mg tablet; Take 1 tablet (5 mg total) by mouth daily  -     fludrocortisone (FLORINEF) 0 1 mg tablet; Take 1 tablet (0 1 mg total) by mouth daily  -     furosemide (LASIX) 20 mg tablet; Take 1 tablet (20 mg total) by mouth daily    PAD (peripheral artery disease) ([facility])    Non-healing wound of left heel    S/P CABG x 3    Type 2 diabetes mellitus with diabetic neuropathy, with long-term current use of insulin ([facility])    Bacterial infection  -     levofloxacin (LEVAQUIN) 500 mg tablet; Take 1 tablet (500 mg total) by mouth every 24 hours for 10 days    Other orders  -     clopidogrel (PLAVIX) 75 mg tablet;           Subjective:      Patient ID: Yannick Ennis is a 61 y o  male  Patient is here status post CABG x3 with CAD peripheral vascular disease open wound on left heel along with history of CHF, diabetes, diabetic neuropathy hypertension hyperlipidemia  Patient with  Fever over the past 3 days  Patient feeling tired    Patient did have diarrhea over the past 2 days  Patient did have some nausea but no vomiting  No significant URI symptoms  No significant UTI symptoms  The following portions of the patient's history were reviewed and updated as appropriate: allergies, current medications, past family history, past medical history, past social history, past surgical history and problem list     Review of Systems   Constitutional: Positive for fever  HENT: Negative  Eyes: Negative  Respiratory: Negative  Cardiovascular: Negative  Gastrointestinal: Positive for diarrhea and nausea  Endocrine: Negative  Genitourinary: Negative  Musculoskeletal: Negative  Skin: Positive for wound  Allergic/Immunologic: Negative  Neurological: Negative  Hematological: Negative  Psychiatric/Behavioral: Negative  Objective:      /70 (BP Location: Left arm, Patient Position: Sitting, Cuff Size: Standard)   Temp 100 3 °F (37 9 °C) (Tympanic)          Physical Exam   Constitutional: He is oriented to person, place, and time  He appears well-developed and well-nourished  No distress  HENT:   Head: Normocephalic  Right Ear: External ear normal    Left Ear: External ear normal    Mouth/Throat: Oropharynx is clear and moist  No oropharyngeal exudate  Eyes: EOM are normal  Pupils are equal, round, and reactive to light  Right eye exhibits no discharge  Left eye exhibits no discharge  No scleral icterus  Neck: Normal range of motion  Neck supple  No thyromegaly present  Cardiovascular: Normal rate, regular rhythm, normal heart sounds and intact distal pulses  Exam reveals no gallop and no friction rub  No murmur heard  Pulmonary/Chest: Effort normal and breath sounds normal  No respiratory distress  He has no wheezes  He has no rales  He exhibits no tenderness  Abdominal: Soft  Bowel sounds are normal  He exhibits no distension  There is no tenderness  There is no rebound and no guarding  Musculoskeletal: Normal range of motion  He exhibits no edema or tenderness  Lymphadenopathy:     He has no cervical adenopathy  Neurological: He is oriented to person, place, and time  No cranial nerve deficit  He exhibits normal muscle tone  Coordination normal    Skin: Skin is warm and dry  No rash noted  He is not diaphoretic  No erythema  No pallor  Right inguinal region no erythema status post balloon angioplasty left leg   Psychiatric: He has a normal mood and affect  His behavior is normal  Judgment and thought content normal    Nursing note and vitals reviewed

## 2018-06-13 ENCOUNTER — OFFICE VISIT (OUTPATIENT)
Dept: CARDIOLOGY CLINIC | Facility: CLINIC | Age: 58
End: 2018-06-13
Payer: COMMERCIAL

## 2018-06-13 ENCOUNTER — OFFICE VISIT (OUTPATIENT)
Dept: VASCULAR SURGERY | Facility: CLINIC | Age: 58
End: 2018-06-13
Payer: COMMERCIAL

## 2018-06-13 VITALS
DIASTOLIC BLOOD PRESSURE: 50 MMHG | SYSTOLIC BLOOD PRESSURE: 82 MMHG | HEIGHT: 73 IN | HEART RATE: 76 BPM | BODY MASS INDEX: 33.53 KG/M2 | WEIGHT: 253 LBS

## 2018-06-13 VITALS
BODY MASS INDEX: 33.13 KG/M2 | SYSTOLIC BLOOD PRESSURE: 136 MMHG | DIASTOLIC BLOOD PRESSURE: 70 MMHG | TEMPERATURE: 97.5 F | HEIGHT: 73 IN | WEIGHT: 250 LBS

## 2018-06-13 DIAGNOSIS — Z95.1 S/P CABG X 3: Primary | ICD-10-CM

## 2018-06-13 DIAGNOSIS — Z79.4 TYPE 2 DIABETES MELLITUS WITH CHRONIC KIDNEY DISEASE, WITH LONG-TERM CURRENT USE OF INSULIN, UNSPECIFIED CKD STAGE (HCC): ICD-10-CM

## 2018-06-13 DIAGNOSIS — I95.1 ORTHOSTATIC HYPOTENSION: ICD-10-CM

## 2018-06-13 DIAGNOSIS — E11.621 DIABETIC ULCER OF LEFT HEEL ASSOCIATED WITH TYPE 2 DIABETES MELLITUS, LIMITED TO BREAKDOWN OF SKIN (HCC): Primary | ICD-10-CM

## 2018-06-13 DIAGNOSIS — I50.33 ACUTE ON CHRONIC DIASTOLIC HEART FAILURE (HCC): ICD-10-CM

## 2018-06-13 DIAGNOSIS — E11.43 DIABETIC GASTROPARESIS (HCC): Chronic | ICD-10-CM

## 2018-06-13 DIAGNOSIS — R60.0 BILATERAL LOWER EXTREMITY EDEMA: Chronic | ICD-10-CM

## 2018-06-13 DIAGNOSIS — E11.22 TYPE 2 DIABETES MELLITUS WITH CHRONIC KIDNEY DISEASE, WITH LONG-TERM CURRENT USE OF INSULIN, UNSPECIFIED CKD STAGE (HCC): ICD-10-CM

## 2018-06-13 DIAGNOSIS — K31.84 DIABETIC GASTROPARESIS (HCC): Chronic | ICD-10-CM

## 2018-06-13 DIAGNOSIS — L97.421 DIABETIC ULCER OF LEFT HEEL ASSOCIATED WITH TYPE 2 DIABETES MELLITUS, LIMITED TO BREAKDOWN OF SKIN (HCC): Primary | ICD-10-CM

## 2018-06-13 PROCEDURE — 99214 OFFICE O/P EST MOD 30 MIN: CPT | Performed by: INTERNAL MEDICINE

## 2018-06-13 PROCEDURE — 99214 OFFICE O/P EST MOD 30 MIN: CPT | Performed by: SURGERY

## 2018-06-13 NOTE — PROGRESS NOTES
Cardiology Follow Up Visit    Lisa Henderson  1960  [id number]  [address] [facility] Carol Coburn  [address]    1  S/P CABG x 3     2  Bilateral lower extremity edema     3  Acute on chronic diastolic heart failure ([address] )     4  Diabetic gastroparesis ([address] )     5  Orthostatic hypotension         Interval History:     Patient presents to the office today to establish cardiac care following coronary artery bypass grafting surgery which she had in March of 2018  Patient ultimately had coronary artery bypass grafting x3 with LIMA to LAD and vein grafts to the circumflex and right coronary artery  He had a circuitous route to the diagnosis of coronary artery disease  He was admitted due [facility] with nonhealing foot ulcer  After being there for several days troponins were checked were elevated ultimately led to a cardiac catheterization where chronic three-vessel coronary artery disease was identified  Ejection fraction 55%  He had no antecedent angina  He was transferred to [address] [place] [address] for surgery  He had been undergoing endovascular intervention to help his left diabetic foot ulcer heel  He recently had popliteal artery recanalization with drug-eluting balloon by vascular surgery  He has been very limited due to complications of diabetes mellitus including autonomic dysfunction  He takes Florinef for orthostatic hypotension  He has had diabetic retinopathy  He has also had diabetic gastroparesis  He was placed on Plavix  He stopped his aspirin after a visiting home nurse said that he did need both  He has been on hypo and statin atorvastatin 80 mg daily  No beta blockers due to his orthostatic hypotension  He has been intermittently hospitalized for complications of this diabetic ulcer with low-grade fevers as well  Was evaluated by Infectious Disease    He is taking antibiotics  Patient Active Problem List   Diagnosis    Anxiety and depression    Benign essential HTN    Diabetic neuropathy, type II diabetes mellitus ([facility] )    Hyperlipidemia    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 ([facility])    Vitamin D deficiency    PAD (peripheral artery disease) ([facility])    Elevated troponin    Non-healing wound of left heel    Orthostatic hypotension    Diabetic gastroparesis ([facility])    Bilateral lower extremity edema    Class 2 obesity due to excess calories with serious comorbidity and body mass index (BMI) of 35 0 to 35 9 in adult    Triple vessel coronary artery disease    Acute on chronic diastolic heart failure (HCC)    Other constipation    S/P CABG x 3    Diabetic autonomic neuropathy associated with type 2 diabetes mellitus ([facility])    Hyponatremia    Encounter for postoperative care    Diabetic ulcer of left heel associated with type 2 diabetes mellitus, limited to breakdown of skin ([place] [address] )    Healthcare-associated pneumonia    Chronic CHF ([place] [address] )    Febrile illness, acute    Post-procedural fever     Past Medical History:   Diagnosis Date    Anemia     Anxiety and depression     Autonomic neuropathy     Cataract     Coronary artery disease     Diabetes mellitus ([facility] )     Diabetic autonomic neuropathy associated with type 2 diabetes mellitus ([place] [address] )     Last Assessed: 12/28/2017    Gastroparesis due to DM ([facility] )     History of DVT (deep vein thrombosis)     left LE    HTN (hypertension)     Hyperlipidemia     Non healing left heel wound     Obesity     Orthostatic hypotension      Social History     Social History    Marital status: /Civil Union     Spouse name: N/A    Number of children: N/A    Years of education: N/A     Occupational History    Not on file       Social History Main Topics    Smoking status: Former Smoker     Packs/day: 1 00     Years: 12 00     Types: Cigarettes     Quit date: 3/23/1994    Smokeless tobacco: Never Used    Alcohol use No    Drug use: No    Sexual activity: Not on file     Other Topics Concern    Not on file     Social History Narrative    No narrative on file      Family History   Problem Relation Age of Onset    Atrial fibrillation Mother     Stroke Mother     Hypertension Father     Heart attack Paternal Grandfather 61     Past Surgical History:   Procedure Laterality Date    FL CABG, ARTERY-VEIN, FOUR N/A 3/28/2018    Procedure: CORONARY ARTERY BYPASS GRAFT (CABG) x3 VESSELS, LIMA TO LAD, SVG--> PDA, SVG--> OM2,  RIGHT LEG EVH/SVH TO , INTRA-OP AMANDEEP;  Surgeon: Gabriela Perez MD;  [facility];  Service: Cardiac Surgery    ROTATOR CUFF REPAIR Bilateral        Current Outpatient Prescriptions:     atorvastatin (LIPITOR) 80 mg tablet, Take 1 tablet (80 mg total) by mouth daily with dinner, Disp: 30 tablet, Rfl: 1    clopidogrel (PLAVIX) 75 mg tablet, 75 mg daily  , Disp: , Rfl:     ferrous sulfate 325 (65 Fe) mg tablet, Take 1 tablet (325 mg total) by mouth 2 (two) times a day with meals, Disp: 30 tablet, Rfl: 1    furosemide (LASIX) 20 mg tablet, Take 1 tablet (20 mg total) by mouth daily, Disp: 30 tablet, Rfl: 1    glucose blood test strip, Use as instructed, Disp: 100 each, Rfl: 0    insulin glargine (LANTUS) 100 units/mL subcutaneous injection, Inject 70 Units under the skin every 12 (twelve) hours, Disp: 10 mL, Rfl: 0    levofloxacin (LEVAQUIN) 500 mg tablet, Take 1 tablet (500 mg total) by mouth every 24 hours for 10 days, Disp: 10 tablet, Rfl: 0    midodrine (PROAMATINE) 10 MG tablet, Take 1 tablet (10 mg total) by mouth 3 (three) times a day with meals (Patient taking differently: Take 5 mg by mouth 3 (three) times a day with meals  ), Disp: 90 tablet, Rfl: 2    polyethylene glycol (MIRALAX) 17 g packet, Take 17 g by mouth daily, Disp: 14 each, Rfl: 0    docusate sodium (COLACE) 100 mg capsule, Take 1 capsule (100 mg total) by mouth 2 (two) times a day, Disp: 60 capsule, Rfl: 1    fludrocortisone (FLORINEF) 0 1 mg tablet, Take 1 tablet (0 1 mg total) by mouth daily, Disp: 30 tablet, Rfl: 1    metoclopramide (REGLAN) 5 mg tablet, Take 1 tablet (5 mg total) by mouth daily, Disp: 30 tablet, Rfl: 1    tamsulosin (FLOMAX) 0 4 mg, Take 1 capsule (0 4 mg total) by mouth 2 (two) times a day, Disp: 60 capsule, Rfl: 1  Allergies   Allergen Reactions    Metformin          Social, Family, Medication history reviewed and updated as necessary      Labs:     Lab Results   Component Value Date     05/25/2018    K 3 5 05/25/2018     05/25/2018    CO2 28 05/25/2018    BUN 12 05/25/2018    CREATININE 0 80 05/25/2018    CREATININE 0 76 05/24/2018    GLUCOSE 89 05/25/2018    CALCIUM 8 6 05/25/2018       Lab Results   Component Value Date    WBC 8 41 05/24/2018    HGB 10 0 (L) 05/24/2018    HGB 11 1 (L) 05/23/2018    HCT 31 9 (L) 05/24/2018    HCT 35 4 (L) 05/23/2018     05/24/2018     05/23/2018       Lab Results   Component Value Date    CHOL 85 03/16/2018    CHOL 169 01/02/2018     Lab Results   Component Value Date    HDL 33 (L) 03/16/2018    HDL 38 (L) 01/02/2018     Lab Results   Component Value Date    LDLCALC 39 03/16/2018    LDLCALC 96 01/02/2018     Lab Results   Component Value Date    TRIG 64 03/16/2018    TRIG 176 (H) 01/02/2018       Lab Results   Component Value Date    ALT 23 05/23/2018    ALT 20 03/15/2018    AST 29 05/23/2018    AST 22 03/15/2018       Lab Results   Component Value Date    INR 1 13 05/23/2018    INR 1 11 05/22/2018       Lab Results   Component Value Date    NTBNP 527 (H) 03/14/2018    NTBNP 794 (H) 12/16/2017       Lab Results   Component Value Date    HGBA1C 9 4 (H) 03/14/2018    HGBA1C 10 5 11/03/2017    HGBA1C 9 6 (H) 09/13/2016           Imaging: Reviewed in epic        Review of Systems:  14 systems reviewed and negative with exception of the above and the following fatigue, generalized weakness, 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    Review of Systems    PHYSICAL EXAM:    Physical Exam    Vitals:    06/13/18 1306   BP: (!) 82/50   Pulse: 76     Body mass index is 33 38 kg/m²  Weight (last 2 days)     Date/Time   Weight    06/13/18 1306  115 (253)              Gen: No acute distress  HEENT: anicteric, mucous membranes moist  Neck: supple, no jugular venous distention, or carotid bruit  Heart: regular, normal s1 and s2, no murmur/rub or gallop  Lungs :clear to auscultation bilaterally, no rales/rhonchi or wheeze  Abdomen: soft nontender, normoactive bowel sounds, no organomegaly  Ext:  1+ edema, left foot with wound where the ulcer is  Skin: warm, no rashes  Neuro: AAO x 3, no focal findings  Psychiatric: normal affect  Musculoskeletal: no obvious joint deformities  Discussion/Summary:    1  Coronary artery disease    A  Coronary bypass grafting x3 March 2018 parentheses LIMA to LAD; saphenous vein graft to the circumflex; saphenous vein graft to the distal right coronary artery)    B  Ejection fraction 55% with inferior hypokinesis    2  Peripheral vascular disease with recent popliteal artery drug-eluting balloon for treatment of a nonhealing the ulcer in the left foot    3  Complications of diabetes including diabetic retinopathy, autonomic dysfunction with orthostatic hypotension, neuropathy, gastroparesis    4  Hyperlipidemia    5  Venous insuff edema,     Plan:  Patient had essentially complete revascularization with coronary bypass grafting surgery in March 2018 after presented with a non healing foot ulcer and having elevated troponin to prompted a cardiac catheterization  He is overall doing well from a cardiac standpoint  He is on limited therapy due to his orthostatic hypotension but is on clopidogrel and high potency statin  Apparently, this was changed to clopidogrel from aspirin  Nonetheless, he is on antiplatelet therapy    He takes low-dose furosemide for lower extremity edema  Routine follow-up recommended  No cardiac testing required at this point    No med changes

## 2018-06-13 NOTE — ASSESSMENT & PLAN NOTE
Status post left lower extremity arteriogram with recanalization of occluded popliteal artery  He has three-vessel runoff with dominant vessel via the anterior tibial   Posterior tibial artery is atretic with multiple stenoses  He has a large left calcaneal wound  Discussed with Dr Violeta Umanzor regarding salvage ability of wound  At this time will continue local wound care with possible VAC  I will obtain post intervention leads  If wound continues to progress may need repeat arteriogram to address tibial disease  Return office visit in 2 weeks

## 2018-06-13 NOTE — PATIENT INSTRUCTIONS
Peripheral Artery Disease   AMBULATORY CARE:   Peripheral artery disease (PAD)  is narrow, weak, or blocked arteries  It may affect any arteries outside of your heart and brain  PAD is usually the result of a buildup of fat and cholesterol, also called plaque, along your artery walls  Inflammation, a blood clot, or abnormal cell growth could also block your arteries  PAD prevents normal blood flow to your legs and arms  You are at risk of an amputation if poor blood flow keeps wounds from healing or causes gangrene (tissue death)  Without treatment, PAD can also cause a heart attack or stroke  Common symptoms include:  Mild PAD usually does not cause symptoms  As the disease worsens over time, you may have the following:  · Pain or cramps in your leg or hip while you walk     · A numb, weak, or heavy feeling in your legs     · Dry, scaly, red, or pale skin on your legs     · Thick or brittle nails, or hair loss on your arms and legs     · Foot sores that will not heal     · Burning or aching in your feet and toes while resting (this may be worse when you lie down)  Call 911 for the following:   · You have any of the following signs of a heart attack:      ¨ Squeezing, pressure, or pain in your chest that lasts longer than 5 minutes or returns    ¨ Discomfort or pain in your back, neck, jaw, stomach, or arm     ¨ Trouble breathing    ¨ Nausea or vomiting    ¨ Lightheadedness or a sudden cold sweat, especially with chest pain or trouble breathing    · You have any of the following signs of a stroke:      ¨ Numbness or drooping on one side of your face     ¨ Weakness in an arm or leg    ¨ Confusion or difficulty speaking    ¨ Dizziness, a severe headache, or vision loss  Seek care immediately if:   · You have sores or wounds that will not heal      · You notice black or discolored skin on your arm or leg  · Your skin is cool to the touch    Contact your healthcare provider if:   · You have leg pain when you walk 1/8 mile (200 meters) or less, even with treatment  · Your legs are red, dry, or pale, even with treatment  · You have questions or concerns about your condition or care  Treatment for PAD  can help reduce your risk of a heart attack, stroke, or amputation  You may need more than one of the following:  · Medicines  may be given to prevent blood clots and reduce the risk of a heart attack or stroke  You may be given medicine to help prevent your PAD from getting worse  · A supervised exercise program  helps you stay active in normal daily activities and may prevent disability  Healthcare providers will help you safely walk or do strength training exercises 3 times a week for 30 to 60 minutes  You will do this for several months, then transition to walking on your own  · Angioplasty  is a procedure to open your artery so blood can flow through normally  A thin tube called a catheter is used to insert a small balloon into your artery  The balloon is inflated to open your blocked artery, and then removed  A tube called a stent may be placed in your artery to hold it open  · Bypass surgery  is used to make a new connection to your artery with a vein from another part of your body, or an artificial graft  The vein or graft is attached to your artery above and below your blockage  This allows blood to flow around the blocked portion of your artery  Manage and prevent PAD:   · Walk for 30 to 60 minutes at least 4 times a week  Your healthcare provider may also refer you to an supervised exercise program  The program helps increase how far you can walk without pain  It also helps you stay active in normal daily activities and may prevent disability caused by PAD  · Do not smoke  Nicotine and other chemicals in cigarettes and cigars can worsen PAD  They can also increase your risk for a heart attack or stroke  Ask your healthcare provider for information if you currently smoke and need help to quit  E-cigarettes or smokeless tobacco still contain nicotine  Talk to your healthcare provider before you use these products  · Manage other health conditions  Take your medicines as directed and follow your healthcare provider's instructions if you have high blood pressure or high cholesterol  Perform foot care and check your blood sugar levels as directed if you have diabetes  · Eat heart healthy foods  Eat whole grains, fruits, and vegetables every day  Limit salt and high-fat foods  Ask your healthcare provider for more information on a heart healthy diet  Ask if you need to lose weight  Your healthcare provider can help you create a healthy weight-loss plan  Follow up with your healthcare provider as directed:  Write down your questions so you remember to ask them during your visits  © 2017 [facility] Information is for End User's use only and may not be sold, redistributed or otherwise used for commercial purposes  All illustrations and images included in CareNotes® are the copyrighted property of [employer]  or [employer]  The above information is an  only  It is not intended as medical advice for individual conditions or treatments  Talk to your doctor, nurse or pharmacist before following any medical regimen to see if it is safe and effective for you

## 2018-06-13 NOTE — PROGRESS NOTES
No problem-specific Assessment & Plan notes found for this encounter  Assessment/Plan   {Assess/PlanSmartLinks:77968}    No chief complaint on file  Subjective   Patient ID: Claude Burnham is a 61 y o  male  Chief complaint: Pt is here to review LLE agram done 6/7/18 by Dr Lisa Moffett  Pt is going to wound care for left foot wound  Pt had VN coming twice weekly  Pt is taking plavix  HPI    {History Review (Optional):58650}    Review of Systems   Constitutional: Positive for appetite change, fatigue and fever  HENT: Negative  Eyes: Positive for visual disturbance  Respiratory: Negative  Cardiovascular: Negative  Gastrointestinal: Negative  Endocrine: Negative  Genitourinary: Negative  Musculoskeletal: Negative  Skin: Positive for wound  Allergic/Immunologic: Negative  Neurological: Positive for weakness and numbness  Hematological: Negative  Psychiatric/Behavioral: Negative          Patient Active Problem List   Diagnosis    Anxiety and depression    Benign essential HTN    Diabetic neuropathy, type II diabetes mellitus ([facility]    Hyperlipidemia    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 ([facility]    Vitamin D deficiency    PAD (peripheral artery disease) ([facility]    Elevated troponin    Non-healing wound of left heel    Orthostatic hypotension    Diabetic gastroparesis (HCC)    Bilateral lower extremity edema    Class 2 obesity due to excess calories with serious comorbidity and body mass index (BMI) of 35 0 to 35 9 in adult    Triple vessel coronary artery disease    Acute on chronic diastolic heart failure (HCC)    Other constipation    S/P CABG x 3    Diabetic autonomic neuropathy associated with type 2 diabetes mellitus ([facility]    Hyponatremia    Encounter for postoperative care    Diabetic ulcer of left heel associated with type 2 diabetes mellitus, limited to breakdown of skin ([facility] )    Healthcare-associated pneumonia    Chronic CHF ([facility]    Febrile illness, acute    Post-procedural fever       Past Surgical History:   Procedure Laterality Date    ND CABG, ARTERY-VEIN, FOUR N/A 3/28/2018    Procedure: CORONARY ARTERY BYPASS GRAFT (CABG) x3 VESSELS, LIMA TO LAD, SVG--> PDA, SVG--> OM2,  RIGHT LEG EVH/SVH TO , INTRA-OP AMANDEEP;  Surgeon: Rosalia Reeys MD;  [facility];  Service: Cardiac Surgery    ROTATOR CUFF REPAIR Bilateral        Family History   Problem Relation Age of Onset    Atrial fibrillation Mother     Stroke Mother     Hypertension Father     Heart attack Paternal Grandfather 61       Social History     Social History    Marital status: /Civil Union     Spouse name: N/A    Number of children: N/A    Years of education: N/A     Occupational History    Not on file       Social History Main Topics    Smoking status: Former Smoker     Packs/day: 1 00     Years: 12 00     Types: Cigarettes     Quit date: 3/23/1994    Smokeless tobacco: Never Used    Alcohol use No    Drug use: No    Sexual activity: Not on file     Other Topics Concern    Not on file     Social History Narrative    No narrative on file       Allergies   Allergen Reactions    Metformin          Current Outpatient Prescriptions:     atorvastatin (LIPITOR) 80 mg tablet, Take 1 tablet (80 mg total) by mouth daily with dinner, Disp: 30 tablet, Rfl: 1    clopidogrel (PLAVIX) 75 mg tablet, , Disp: , Rfl:     docusate sodium (COLACE) 100 mg capsule, Take 1 capsule (100 mg total) by mouth 2 (two) times a day, Disp: 60 capsule, Rfl: 1    ferrous sulfate 325 (65 Fe) mg tablet, Take 1 tablet (325 mg total) by mouth 2 (two) times a day with meals, Disp: 30 tablet, Rfl: 1    fludrocortisone (FLORINEF) 0 1 mg tablet, Take 1 tablet (0 1 mg total) by mouth daily, Disp: 30 tablet, Rfl: 1    furosemide (LASIX) 20 mg tablet, Take 1 tablet (20 mg total) by mouth daily, Disp: 30 tablet, Rfl: 1    insulin glargine (LANTUS) 100 units/mL subcutaneous injection, Inject 70 Units under the skin every 12 (twelve) hours, Disp: 10 mL, Rfl: 0    levofloxacin (LEVAQUIN) 500 mg tablet, Take 1 tablet (500 mg total) by mouth every 24 hours for 10 days, Disp: 10 tablet, Rfl: 0    metoclopramide (REGLAN) 5 mg tablet, Take 1 tablet (5 mg total) by mouth daily, Disp: 30 tablet, Rfl: 1    midodrine (PROAMATINE) 10 MG tablet, Take 1 tablet (10 mg total) by mouth 3 (three) times a day with meals (Patient taking differently: Take 5 mg by mouth 3 (three) times a day with meals  ), Disp: 90 tablet, Rfl: 2    polyethylene glycol (MIRALAX) 17 g packet, Take 17 g by mouth daily, Disp: 14 each, Rfl: 0    tamsulosin (FLOMAX) 0 4 mg, Take 1 capsule (0 4 mg total) by mouth 2 (two) times a day, Disp: 60 capsule, Rfl: 1    Objective     Physical Exam:    General appearance: {general exam:98107}  Skin: {skin exam:67033::"Skin color, texture, turgor normal  No rashes or lesions"}  Neurologic: {neuro exam:44385::"Grossly normal"}  Head: {head exam:06855::"Normocephalic, without obvious abnormality","atraumatic"}  Eyes: {eye exam:201::"conjunctivae/corneas clear  PERRL, EOM's intact  Fundi benign  "}  Throat: {throat exam:38293::"lips, mucosa, and tongue normal; teeth and gums normal"}  Neck: {neck exam:39926::"no adenopathy","no carotid bruit","no JVD","supple, symmetrical, trachea midline","thyroid not enlarged, symmetric, no tenderness/mass/nodules"}  Back: {back exam:801::"symmetric, no curvature   ROM normal  No CVA tenderness "}  Lungs: {lung exam:65620::"clear to auscultation bilaterally"}  Chest wall: {chest exam:66429::"no tenderness"}  Heart: {heart exam:5510::"regular rate and rhythm, S1, S2 normal, no murmur, click, rub or gallop"}  Abdomen: {abdominal exam:10247::"soft, non-tender; bowel sounds normal; no masses,  no organomegaly"}  Extremities: {extremity exam:5109::"extremities normal, warm and well-perfused; no cyanosis, clubbing, or edema"}    Pulse exam:  Radial: Right: {Vascular Pulse Exam:21087} Left[de-identified] {Vascular Pulse Exam:21087}  Ulnar: Right: {Vascular Pulse Exam:21087} Left[de-identified] {Vascular Pulse Exam:21087}  Femoral: Right: {Vascular Pulse Exam:21087} Left: {Vascular Pulse Exam:21087}  Popliteal: Right: {Vascular Pulse Exam:21087} Left: {Vascular Pulse Exam:21087}  DP: Right: {Vascular Pulse Exam:21087} Left: {Vascular Pulse Exam:21087}  PT: Right: {Vascular Pulse Exam:21087} Left: {Vascular Pulse Exam:21087}

## 2018-06-13 NOTE — PROGRESS NOTES
Assessment/Plan:    Diabetic ulcer of left heel associated with type 2 diabetes mellitus, limited to breakdown of skin ([address] )  Status post left lower extremity arteriogram with recanalization of occluded popliteal artery  He has three-vessel runoff with dominant vessel via the anterior tibial   Posterior tibial artery is atretic with multiple stenoses  He has a large left calcaneal wound  Discussed with Dr Tavo Del Rio regarding salvage ability of wound  At this time will continue local wound care with possible VAC  I will obtain post intervention leads  If wound continues to progress may need repeat arteriogram to address tibial disease  Return office visit in 2 weeks for wound reassessment  If wound deteriorates and/or healing stagnant he understands he is at risk risk of major amputation  Diagnoses and all orders for this visit:    Diabetic ulcer of left heel associated with type 2 diabetes mellitus, limited to breakdown of skin (HCC)  -     VAS lower limb arterial duplex, limited/unilateral; Future          Subjective:      Patient ID: Jefferson Castro is a 61 y o  male  Chief complaint: Pt is here to review LLE agram done 6/7/18 by Dr Cecelia Robledo  Pt is going to wound care for left foot wound  Pt had VN coming twice weekly  Pt is taking plavix  Seble Moustapha returns to the office following left lower extremity arteriogram with recanalization of his left popliteal artery for significant left calcaneal wound  He reports that podiatry had done some local debridement with decent bleeding at the site  He was also told that wound may be so extensive that hopefully not salvageable  At this point we will obtain a lower extremity arterial duplex post intervention to reassess distal perfusion  We discussed that if wound continues to show signs of healing may need repeat arteriogram with attempt at tibial intervention (posterior tibial artery)          The following portions of the patient's history were reviewed and updated as appropriate: allergies, current medications, past family history, past medical history, past social history, past surgical history and problem list     Review of Systems   Constitutional: Positive for appetite change, fatigue and unexpected weight change  HENT: Negative  Eyes: Positive for visual disturbance  Respiratory: Negative  Cardiovascular: Negative  Gastrointestinal: Negative  Endocrine: Negative  Genitourinary: Negative  Musculoskeletal: Negative  Skin: Positive for wound  Allergic/Immunologic: Negative  Neurological: Negative  Hematological: Negative  Psychiatric/Behavioral: Negative          Vitals:    06/13/18 0839   BP: 136/70   BP Location: Left arm   Patient Position: Sitting   Cuff Size: Adult   Temp: 97 5 °F (36 4 °C)   TempSrc: Tympanic   Weight: 113 kg (250 lb)   Height: 6' 1" (1 854 m)       Patient Active Problem List   Diagnosis    Anxiety and depression    Benign essential HTN    Diabetic neuropathy, type II diabetes mellitus [facility]    Hyperlipidemia    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 ([facility])    Vitamin D deficiency    PAD (peripheral artery disease) ([facility])    Elevated troponin    Non-healing wound of left heel    Orthostatic hypotension    Diabetic gastroparesis (HCC)    Bilateral lower extremity edema    Class 2 obesity due to excess calories with serious comorbidity and body mass index (BMI) of 35 0 to 35 9 in adult    Triple vessel coronary artery disease    Acute on chronic diastolic heart failure (HCC)    Other constipation    S/P CABG x 3    Diabetic autonomic neuropathy associated with type 2 diabetes mellitus ([facility])    Hyponatremia    Encounter for postoperative care    Diabetic ulcer of left heel associated with type 2 diabetes mellitus, limited to breakdown of skin ([facility] )    Healthcare-associated pneumonia    [facility]    Febrile illness, acute  Post-procedural fever       Past Surgical History:   Procedure Laterality Date    [place] CABG, ARTERY-VEIN, FOUR N/A 3/28/2018    Procedure: CORONARY ARTERY BYPASS GRAFT (CABG) x3 VESSELS, LIMA TO LAD, SVG--> PDA, SVG--> OM2,  RIGHT LEG EVH/SVH TO , INTRA-OP AMANDEEP;  Surgeon: Nagi Kidd MD;  [facility];  Service: Cardiac Surgery    ROTATOR CUFF REPAIR Bilateral        Family History   Problem Relation Age of Onset    Atrial fibrillation Mother     Stroke Mother     Hypertension Father     Heart attack Paternal Grandfather 61       Social History     Social History    Marital status: /Civil Union     Spouse name: N/A    Number of children: N/A    Years of education: N/A     Occupational History    Not on file       Social History Main Topics    Smoking status: Former Smoker     Packs/day: 1 00     Years: 12 00     Types: Cigarettes     Quit date: 3/23/1994    Smokeless tobacco: Never Used    Alcohol use No    Drug use: No    Sexual activity: Not on file     Other Topics Concern    Not on file     Social History Narrative    No narrative on file       Allergies   Allergen Reactions    Metformin          Current Outpatient Prescriptions:     atorvastatin (LIPITOR) 80 mg tablet, Take 1 tablet (80 mg total) by mouth daily with dinner, Disp: 30 tablet, Rfl: 1    clopidogrel (PLAVIX) 75 mg tablet, , Disp: , Rfl:     docusate sodium (COLACE) 100 mg capsule, Take 1 capsule (100 mg total) by mouth 2 (two) times a day, Disp: 60 capsule, Rfl: 1    ferrous sulfate 325 (65 Fe) mg tablet, Take 1 tablet (325 mg total) by mouth 2 (two) times a day with meals, Disp: 30 tablet, Rfl: 1    fludrocortisone (FLORINEF) 0 1 mg tablet, Take 1 tablet (0 1 mg total) by mouth daily, Disp: 30 tablet, Rfl: 1    furosemide (LASIX) 20 mg tablet, Take 1 tablet (20 mg total) by mouth daily, Disp: 30 tablet, Rfl: 1    insulin glargine (LANTUS) 100 units/mL subcutaneous injection, Inject 70 Units under the skin every 12 (twelve) hours, Disp: 10 mL, Rfl: 0    levofloxacin (LEVAQUIN) 500 mg tablet, Take 1 tablet (500 mg total) by mouth every 24 hours for 10 days, Disp: 10 tablet, Rfl: 0    metoclopramide (REGLAN) 5 mg tablet, Take 1 tablet (5 mg total) by mouth daily, Disp: 30 tablet, Rfl: 1    midodrine (PROAMATINE) 10 MG tablet, Take 1 tablet (10 mg total) by mouth 3 (three) times a day with meals (Patient taking differently: Take 5 mg by mouth 3 (three) times a day with meals  ), Disp: 90 tablet, Rfl: 2    polyethylene glycol (MIRALAX) 17 g packet, Take 17 g by mouth daily, Disp: 14 each, Rfl: 0    tamsulosin (FLOMAX) 0 4 mg, Take 1 capsule (0 4 mg total) by mouth 2 (two) times a day, Disp: 60 capsule, Rfl: 1    Imaging:  Left lower extremity arteriogram images reviewed with patient today in the office  Successful recanalization of occluded left popliteal artery with standard/drug coated balloon angioplasty  He does continue to have significant tibial peroneal disease    Objective:      /70 (BP Location: Left arm, Patient Position: Sitting, Cuff Size: Adult)   Temp 97 5 °F (36 4 °C) (Tympanic)   Ht 6' 1" (1 854 m)   Wt 113 kg (250 lb) Comment: per pt  BMI 32 98 kg/m²          Physical Exam

## 2018-06-21 ENCOUNTER — APPOINTMENT (OUTPATIENT)
Dept: WOUND CARE | Facility: HOSPITAL | Age: 58
End: 2018-06-21
Payer: COMMERCIAL

## 2018-06-21 PROCEDURE — 11042 DBRDMT SUBQ TIS 1ST 20SQCM/<: CPT | Performed by: FAMILY MEDICINE

## 2018-06-21 PROCEDURE — 11045 DBRDMT SUBQ TISS EACH ADDL: CPT | Performed by: FAMILY MEDICINE

## 2018-06-26 ENCOUNTER — APPOINTMENT (OUTPATIENT)
Dept: WOUND CARE | Facility: HOSPITAL | Age: 58
End: 2018-06-26
Payer: COMMERCIAL

## 2018-06-26 PROCEDURE — 11045 DBRDMT SUBQ TISS EACH ADDL: CPT | Performed by: PODIATRIST

## 2018-06-26 PROCEDURE — 11042 DBRDMT SUBQ TIS 1ST 20SQCM/<: CPT | Performed by: PODIATRIST

## 2018-06-26 PROCEDURE — 97605 NEG PRS WND THER DME<=50SQCM: CPT | Performed by: PODIATRIST

## 2018-06-28 DIAGNOSIS — E11.621 DIABETIC ULCER OF LEFT HEEL ASSOCIATED WITH TYPE 2 DIABETES MELLITUS, LIMITED TO BREAKDOWN OF SKIN (HCC): ICD-10-CM

## 2018-06-28 DIAGNOSIS — L97.421 DIABETIC ULCER OF LEFT HEEL ASSOCIATED WITH TYPE 2 DIABETES MELLITUS, LIMITED TO BREAKDOWN OF SKIN (HCC): ICD-10-CM

## 2018-06-28 RX ORDER — INSULIN GLARGINE 100 [IU]/ML
INJECTION, SOLUTION SUBCUTANEOUS
Qty: 135 ML | Refills: 2 | Status: SHIPPED | OUTPATIENT
Start: 2018-06-28 | End: 2018-08-09 | Stop reason: HOSPADM

## 2018-07-02 DIAGNOSIS — Z95.1 S/P CABG (CORONARY ARTERY BYPASS GRAFT): ICD-10-CM

## 2018-07-02 RX ORDER — ATORVASTATIN CALCIUM 80 MG/1
80 TABLET, FILM COATED ORAL
Qty: 30 TABLET | Refills: 3 | OUTPATIENT
Start: 2018-07-02 | End: 2018-12-12 | Stop reason: SDUPTHER

## 2018-07-02 NOTE — TELEPHONE ENCOUNTER
Patient called in for refill of Lipitor 80 mg tab  And have called into the [facility] on [address] [place] [address] in [address]

## 2018-07-03 ENCOUNTER — APPOINTMENT (OUTPATIENT)
Dept: WOUND CARE | Facility: HOSPITAL | Age: 58
End: 2018-07-03
Payer: COMMERCIAL

## 2018-07-03 ENCOUNTER — HOSPITAL ENCOUNTER (OUTPATIENT)
Dept: RADIOLOGY | Facility: HOSPITAL | Age: 58
Discharge: HOME | End: 2018-07-03
Attending: PODIATRIST
Payer: COMMERCIAL

## 2018-07-03 ENCOUNTER — TRANSCRIBE ORDERS (OUTPATIENT)
Dept: ADMINISTRATIVE | Facility: HOSPITAL | Age: 58
End: 2018-07-03

## 2018-07-03 DIAGNOSIS — L97.529 ULCER OF LEFT FOOT, UNSPECIFIED ULCER STAGE (HCC): ICD-10-CM

## 2018-07-03 DIAGNOSIS — L97.529 ULCER OF LEFT FOOT, UNSPECIFIED ULCER STAGE (HCC): Primary | ICD-10-CM

## 2018-07-03 PROCEDURE — 73650 X-RAY EXAM OF HEEL: CPT

## 2018-07-03 PROCEDURE — 11042 DBRDMT SUBQ TIS 1ST 20SQCM/<: CPT | Performed by: PODIATRIST

## 2018-07-03 PROCEDURE — 11045 DBRDMT SUBQ TISS EACH ADDL: CPT | Performed by: PODIATRIST

## 2018-07-05 ENCOUNTER — HOSPITAL ENCOUNTER (OUTPATIENT)
Dept: NON INVASIVE DIAGNOSTICS | Facility: HOSPITAL | Age: 58
Discharge: HOME | End: 2018-07-05
Payer: COMMERCIAL

## 2018-07-05 DIAGNOSIS — E11.621 DIABETIC ULCER OF LEFT HEEL ASSOCIATED WITH TYPE 2 DIABETES MELLITUS, LIMITED TO BREAKDOWN OF SKIN (HCC): ICD-10-CM

## 2018-07-05 DIAGNOSIS — L97.421 DIABETIC ULCER OF LEFT HEEL ASSOCIATED WITH TYPE 2 DIABETES MELLITUS, LIMITED TO BREAKDOWN OF SKIN (HCC): ICD-10-CM

## 2018-07-05 PROCEDURE — 93922 UPR/L XTREMITY ART 2 LEVELS: CPT | Performed by: SURGERY

## 2018-07-05 PROCEDURE — 93926 LOWER EXTREMITY STUDY: CPT | Performed by: SURGERY

## 2018-07-05 PROCEDURE — 93926 LOWER EXTREMITY STUDY: CPT

## 2018-07-10 ENCOUNTER — APPOINTMENT (OUTPATIENT)
Dept: WOUND CARE | Facility: HOSPITAL | Age: 58
End: 2018-07-10
Payer: COMMERCIAL

## 2018-07-10 ENCOUNTER — TRANSCRIBE ORDERS (OUTPATIENT)
Dept: ADMINISTRATIVE | Facility: HOSPITAL | Age: 58
End: 2018-07-10

## 2018-07-10 DIAGNOSIS — B99.9 INFECTION: Primary | ICD-10-CM

## 2018-07-10 PROCEDURE — 11042 DBRDMT SUBQ TIS 1ST 20SQCM/<: CPT | Performed by: PODIATRIST

## 2018-07-10 PROCEDURE — 11045 DBRDMT SUBQ TISS EACH ADDL: CPT | Performed by: PODIATRIST

## 2018-07-16 ENCOUNTER — CONSULT (OUTPATIENT)
Dept: FAMILY MEDICINE CLINIC | Facility: CLINIC | Age: 58
End: 2018-07-16
Payer: COMMERCIAL

## 2018-07-16 VITALS — TEMPERATURE: 98.9 F | HEIGHT: 73 IN | DIASTOLIC BLOOD PRESSURE: 74 MMHG | SYSTOLIC BLOOD PRESSURE: 130 MMHG

## 2018-07-16 DIAGNOSIS — I10 BENIGN ESSENTIAL HTN: ICD-10-CM

## 2018-07-16 DIAGNOSIS — IMO0002 UNCONTROLLED DIABETES MELLITUS TYPE 2 WITH ATHEROSCLEROSIS OF ARTERIES OF EXTREMITIES: ICD-10-CM

## 2018-07-16 DIAGNOSIS — Z01.810 PREOP CARDIOVASCULAR EXAM: Primary | ICD-10-CM

## 2018-07-16 PROCEDURE — 99243 OFF/OP CNSLTJ NEW/EST LOW 30: CPT | Performed by: FAMILY MEDICINE

## 2018-07-16 NOTE — PROGRESS NOTES
Assessment/Plan:    Patient at low /moderate risk due to low risk procedure for bilateral cataract surgery  Blood pressure and sugars controlled at the present time  Okay to proceed     Diagnoses and all orders for this visit:    Preop cardiovascular exam    Benign essential HTN    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 (HCC)          Subjective:      Patient ID: Sandra Cui is a 60 y o  male  Patient is here for cataract surgery bilaterally with 1 IA to be performed on July 25th with Dr Rosa Hawthorne  Patient with decreased visual acuity bilaterally  The patient status post  Cardiacbypass  Left foot with wound VAC  Blood sugars have been in the 130s,  140s and 150s  No chest pain or shortness of breath  No fevers  No cough or sputum production  Normal urination defecation  No bleeding issues  No problems with anesthesia in the past         The following portions of the patient's history were reviewed and updated as appropriate: allergies, current medications, past family history, past medical history, past social history, past surgical history and problem list     Review of Systems   Constitutional: Negative  HENT: Negative  Eyes: Positive for visual disturbance  Respiratory: Negative  Cardiovascular: Negative  Gastrointestinal: Negative  Endocrine: Negative  Genitourinary: Negative  Musculoskeletal: Negative  Skin: Positive for wound  Allergic/Immunologic: Negative  Neurological: Negative  Hematological: Negative  Psychiatric/Behavioral: Negative  Objective:      /74 (BP Location: Right arm, Patient Position: Sitting, Cuff Size: Large)   Temp 98 9 °F (37 2 °C) (Tympanic)   Ht 6' 1" (1 854 m)          Physical Exam   Constitutional: He is oriented to person, place, and time  He appears well-developed and well-nourished  No distress  HENT:   Head: Normocephalic     Right Ear: External ear normal    Left Ear: External ear normal    Mouth/Throat: Oropharynx is clear and moist  No oropharyngeal exudate  Eyes: EOM are normal  Pupils are equal, round, and reactive to light  Right eye exhibits no discharge  Left eye exhibits no discharge  No scleral icterus  Neck: Normal range of motion  Neck supple  No thyromegaly present  Cardiovascular: Normal rate, regular rhythm, normal heart sounds and intact distal pulses  Exam reveals no gallop and no friction rub  No murmur heard  Pulmonary/Chest: Effort normal and breath sounds normal  No respiratory distress  He has no wheezes  He has no rales  He exhibits no tenderness  Abdominal: Soft  Bowel sounds are normal  He exhibits no distension  There is no tenderness  There is no rebound and no guarding  Musculoskeletal: Normal range of motion  He exhibits deformity  He exhibits no edema or tenderness  Left foot wound   Lymphadenopathy:     He has no cervical adenopathy  Neurological: He is oriented to person, place, and time  No cranial nerve deficit  He exhibits normal muscle tone  Coordination normal    Skin: Skin is warm and dry  No rash noted  He is not diaphoretic  No erythema  No pallor  Psychiatric: He has a normal mood and affect  His behavior is normal  Judgment and thought content normal    Nursing note and vitals reviewed

## 2018-07-17 ENCOUNTER — HOSPITAL ENCOUNTER (OUTPATIENT)
Dept: MRI IMAGING | Facility: HOSPITAL | Age: 58
Discharge: HOME | End: 2018-07-17
Payer: COMMERCIAL

## 2018-07-17 ENCOUNTER — APPOINTMENT (OUTPATIENT)
Dept: WOUND CARE | Facility: HOSPITAL | Age: 58
End: 2018-07-17
Payer: COMMERCIAL

## 2018-07-17 DIAGNOSIS — B99.9 INFECTION: ICD-10-CM

## 2018-07-17 PROCEDURE — 11045 DBRDMT SUBQ TISS EACH ADDL: CPT | Performed by: PODIATRIST

## 2018-07-17 PROCEDURE — 11042 DBRDMT SUBQ TIS 1ST 20SQCM/<: CPT | Performed by: PODIATRIST

## 2018-07-17 PROCEDURE — 73721 MRI JNT OF LWR EXTRE W/O DYE: CPT

## 2018-07-19 ENCOUNTER — TELEPHONE (OUTPATIENT)
Dept: FAMILY MEDICINE CLINIC | Facility: CLINIC | Age: 58
End: 2018-07-19

## 2018-07-20 ENCOUNTER — TELEPHONE (OUTPATIENT)
Dept: FAMILY MEDICINE CLINIC | Facility: CLINIC | Age: 58
End: 2018-07-20

## 2018-07-20 NOTE — TELEPHONE ENCOUNTER
Called [facility] to confirm patient's upcoming bilateral cataract surgery dates and spoke with Imelda Tello, the surgery coordinator  Patient had seen Dr Bobby Brooks on July 16 for preop clearance  Imelda Tello stated that the patient's surgery may have to be postponed until 8/15 and 8/29 because the patient has not been seen at their office for injections and follow up since February  Imelda Tello said the patient is being seen this morning in their office and she will update me regarding change in dates and will fax new preoperative evaluation forms  She also said depending on how far the dates are scheduled, the patient may need to be seen again for clearance by Dr Bobby Brooks  I gave her our fax [phone] to forward the necessary forms

## 2018-07-24 ENCOUNTER — APPOINTMENT (OUTPATIENT)
Dept: WOUND CARE | Facility: HOSPITAL | Age: 58
End: 2018-07-24
Payer: COMMERCIAL

## 2018-07-24 PROCEDURE — 99214 OFFICE O/P EST MOD 30 MIN: CPT | Performed by: PODIATRIST

## 2018-07-26 ENCOUNTER — HOSPITAL ENCOUNTER (INPATIENT)
Facility: HOSPITAL | Age: 58
LOS: 14 days | DRG: 617 | End: 2018-08-09
Attending: EMERGENCY MEDICINE | Admitting: HOSPITALIST
Payer: COMMERCIAL

## 2018-07-26 DIAGNOSIS — R77.8 ELEVATED TROPONIN: ICD-10-CM

## 2018-07-26 DIAGNOSIS — Z95.1 S/P CABG X 3: ICD-10-CM

## 2018-07-26 DIAGNOSIS — S91.302A NON-HEALING WOUND OF LEFT HEEL: ICD-10-CM

## 2018-07-26 DIAGNOSIS — Z89.512 S/P BKA (BELOW KNEE AMPUTATION), LEFT (HCC): ICD-10-CM

## 2018-07-26 DIAGNOSIS — I73.9 PAD (PERIPHERAL ARTERY DISEASE) (HCC): ICD-10-CM

## 2018-07-26 DIAGNOSIS — Z95.1 S/P CABG (CORONARY ARTERY BYPASS GRAFT): ICD-10-CM

## 2018-07-26 DIAGNOSIS — J18.9 HEALTHCARE-ASSOCIATED PNEUMONIA: ICD-10-CM

## 2018-07-26 DIAGNOSIS — M86.9 OSTEOMYELITIS (HCC): Primary | ICD-10-CM

## 2018-07-26 LAB
ALBUMIN SERPL BCP-MCNC: 2.8 G/DL (ref 3.5–5)
ALP SERPL-CCNC: 69 U/L (ref 46–116)
ALT SERPL W P-5'-P-CCNC: 14 U/L (ref 12–78)
ANION GAP SERPL CALCULATED.3IONS-SCNC: 5 MMOL/L (ref 4–13)
APTT PPP: 29 SECONDS (ref 24–36)
AST SERPL W P-5'-P-CCNC: 15 U/L (ref 5–45)
BASOPHILS # BLD AUTO: 0.05 THOUSANDS/ΜL (ref 0–0.1)
BASOPHILS NFR BLD AUTO: 1 % (ref 0–1)
BILIRUB SERPL-MCNC: 0.27 MG/DL (ref 0.2–1)
BUN SERPL-MCNC: 12 MG/DL (ref 5–25)
CALCIUM SERPL-MCNC: 8.7 MG/DL (ref 8.3–10.1)
CHLORIDE SERPL-SCNC: 102 MMOL/L (ref 100–108)
CO2 SERPL-SCNC: 29 MMOL/L (ref 21–32)
CREAT SERPL-MCNC: 0.93 MG/DL (ref 0.6–1.3)
EOSINOPHIL # BLD AUTO: 0.29 THOUSAND/ΜL (ref 0–0.61)
EOSINOPHIL NFR BLD AUTO: 3 % (ref 0–6)
ERYTHROCYTE [DISTWIDTH] IN BLOOD BY AUTOMATED COUNT: 15.4 % (ref 11.6–15.1)
GFR SERPL CREATININE-BSD FRML MDRD: 90 ML/MIN/1.73SQ M
GLUCOSE SERPL-MCNC: 105 MG/DL (ref 65–140)
GLUCOSE SERPL-MCNC: 109 MG/DL (ref 65–140)
GLUCOSE SERPL-MCNC: 125 MG/DL (ref 65–140)
HCT VFR BLD AUTO: 37.1 % (ref 36.5–49.3)
HGB BLD-MCNC: 11.5 G/DL (ref 12–17)
IMM GRANULOCYTES # BLD AUTO: 0.04 THOUSAND/UL (ref 0–0.2)
IMM GRANULOCYTES NFR BLD AUTO: 0 % (ref 0–2)
INR PPP: 1.05 (ref 0.86–1.17)
LACTATE SERPL-SCNC: 1.4 MMOL/L (ref 0.5–2)
LYMPHOCYTES # BLD AUTO: 1.97 THOUSANDS/ΜL (ref 0.6–4.47)
LYMPHOCYTES NFR BLD AUTO: 18 % (ref 14–44)
MCH RBC QN AUTO: 25.2 PG (ref 26.8–34.3)
MCHC RBC AUTO-ENTMCNC: 31 G/DL (ref 31.4–37.4)
MCV RBC AUTO: 81 FL (ref 82–98)
MONOCYTES # BLD AUTO: 0.76 THOUSAND/ΜL (ref 0.17–1.22)
MONOCYTES NFR BLD AUTO: 7 % (ref 4–12)
NEUTROPHILS # BLD AUTO: 7.6 THOUSANDS/ΜL (ref 1.85–7.62)
NEUTS SEG NFR BLD AUTO: 71 % (ref 43–75)
NRBC BLD AUTO-RTO: 0 /100 WBCS
PLATELET # BLD AUTO: 323 THOUSANDS/UL (ref 149–390)
PMV BLD AUTO: 9.4 FL (ref 8.9–12.7)
POTASSIUM SERPL-SCNC: 3.6 MMOL/L (ref 3.5–5.3)
PROT SERPL-MCNC: 8 G/DL (ref 6.4–8.2)
PROTHROMBIN TIME: 13.8 SECONDS (ref 11.8–14.2)
RBC # BLD AUTO: 4.57 MILLION/UL (ref 3.88–5.62)
SODIUM SERPL-SCNC: 136 MMOL/L (ref 136–145)
WBC # BLD AUTO: 10.71 THOUSAND/UL (ref 4.31–10.16)

## 2018-07-26 PROCEDURE — 85730 THROMBOPLASTIN TIME PARTIAL: CPT | Performed by: EMERGENCY MEDICINE

## 2018-07-26 PROCEDURE — 36415 COLL VENOUS BLD VENIPUNCTURE: CPT | Performed by: EMERGENCY MEDICINE

## 2018-07-26 PROCEDURE — 96367 TX/PROPH/DG ADDL SEQ IV INF: CPT

## 2018-07-26 PROCEDURE — 99284 EMERGENCY DEPT VISIT MOD MDM: CPT

## 2018-07-26 PROCEDURE — 87040 BLOOD CULTURE FOR BACTERIA: CPT | Performed by: EMERGENCY MEDICINE

## 2018-07-26 PROCEDURE — 99222 1ST HOSP IP/OBS MODERATE 55: CPT | Performed by: INTERNAL MEDICINE

## 2018-07-26 PROCEDURE — 80053 COMPREHEN METABOLIC PANEL: CPT | Performed by: EMERGENCY MEDICINE

## 2018-07-26 PROCEDURE — 85610 PROTHROMBIN TIME: CPT | Performed by: EMERGENCY MEDICINE

## 2018-07-26 PROCEDURE — 85025 COMPLETE CBC W/AUTO DIFF WBC: CPT | Performed by: EMERGENCY MEDICINE

## 2018-07-26 PROCEDURE — 96365 THER/PROPH/DIAG IV INF INIT: CPT

## 2018-07-26 PROCEDURE — 83605 ASSAY OF LACTIC ACID: CPT | Performed by: EMERGENCY MEDICINE

## 2018-07-26 PROCEDURE — 82948 REAGENT STRIP/BLOOD GLUCOSE: CPT

## 2018-07-26 RX ORDER — MIDODRINE HYDROCHLORIDE 5 MG/1
5 TABLET ORAL
Status: DISCONTINUED | OUTPATIENT
Start: 2018-07-27 | End: 2018-08-09 | Stop reason: HOSPADM

## 2018-07-26 RX ORDER — INSULIN GLARGINE 100 [IU]/ML
70 INJECTION, SOLUTION SUBCUTANEOUS EVERY 12 HOURS SCHEDULED
Status: DISCONTINUED | OUTPATIENT
Start: 2018-07-26 | End: 2018-07-29

## 2018-07-26 RX ORDER — FLUDROCORTISONE ACETATE 0.1 MG/1
0.1 TABLET ORAL DAILY
Status: DISCONTINUED | OUTPATIENT
Start: 2018-07-27 | End: 2018-08-09 | Stop reason: HOSPADM

## 2018-07-26 RX ORDER — FUROSEMIDE 20 MG/1
20 TABLET ORAL DAILY
Status: DISCONTINUED | OUTPATIENT
Start: 2018-07-27 | End: 2018-08-09 | Stop reason: HOSPADM

## 2018-07-26 RX ORDER — FERROUS SULFATE 325(65) MG
325 TABLET ORAL 2 TIMES DAILY WITH MEALS
Status: DISCONTINUED | OUTPATIENT
Start: 2018-07-27 | End: 2018-08-09 | Stop reason: HOSPADM

## 2018-07-26 RX ORDER — POLYVINYL ALCOHOL 14 MG/ML
1 SOLUTION/ DROPS OPHTHALMIC EVERY 8 HOURS
Status: DISCONTINUED | OUTPATIENT
Start: 2018-07-26 | End: 2018-07-26

## 2018-07-26 RX ORDER — DOCUSATE SODIUM 100 MG/1
100 CAPSULE, LIQUID FILLED ORAL 2 TIMES DAILY
Status: DISCONTINUED | OUTPATIENT
Start: 2018-07-26 | End: 2018-08-09 | Stop reason: HOSPADM

## 2018-07-26 RX ORDER — CLOPIDOGREL BISULFATE 75 MG/1
75 TABLET ORAL DAILY
Status: DISCONTINUED | OUTPATIENT
Start: 2018-07-27 | End: 2018-08-09 | Stop reason: HOSPADM

## 2018-07-26 RX ORDER — METOCLOPRAMIDE 10 MG/1
5 TABLET ORAL DAILY
Status: DISCONTINUED | OUTPATIENT
Start: 2018-07-27 | End: 2018-08-09 | Stop reason: HOSPADM

## 2018-07-26 RX ORDER — TAMSULOSIN HYDROCHLORIDE 0.4 MG/1
0.4 CAPSULE ORAL
Status: DISCONTINUED | OUTPATIENT
Start: 2018-07-27 | End: 2018-08-09 | Stop reason: HOSPADM

## 2018-07-26 RX ORDER — ATORVASTATIN CALCIUM 80 MG/1
80 TABLET, FILM COATED ORAL
Status: DISCONTINUED | OUTPATIENT
Start: 2018-07-27 | End: 2018-08-09 | Stop reason: HOSPADM

## 2018-07-26 RX ORDER — 0.9 % SODIUM CHLORIDE 0.9 %
3 VIAL (ML) INJECTION AS NEEDED
Status: DISCONTINUED | OUTPATIENT
Start: 2018-07-26 | End: 2018-08-09 | Stop reason: HOSPADM

## 2018-07-26 RX ORDER — ACETAMINOPHEN 325 MG/1
650 TABLET ORAL EVERY 6 HOURS PRN
Status: DISCONTINUED | OUTPATIENT
Start: 2018-07-26 | End: 2018-08-09 | Stop reason: HOSPADM

## 2018-07-26 RX ORDER — CEPHALEXIN 500 MG/1
500 CAPSULE ORAL EVERY 6 HOURS SCHEDULED
COMMUNITY
End: 2018-08-09 | Stop reason: HOSPADM

## 2018-07-26 RX ORDER — MAGNESIUM HYDROXIDE/ALUMINUM HYDROXICE/SIMETHICONE 120; 1200; 1200 MG/30ML; MG/30ML; MG/30ML
15 SUSPENSION ORAL EVERY 6 HOURS PRN
Status: DISCONTINUED | OUTPATIENT
Start: 2018-07-26 | End: 2018-08-09 | Stop reason: HOSPADM

## 2018-07-26 RX ADMIN — DOCUSATE SODIUM 100 MG: 100 CAPSULE, LIQUID FILLED ORAL at 22:19

## 2018-07-26 RX ADMIN — VANCOMYCIN HYDROCHLORIDE 1750 MG: 1 INJECTION, POWDER, LYOPHILIZED, FOR SOLUTION INTRAVENOUS at 19:43

## 2018-07-26 RX ADMIN — CEFEPIME HYDROCHLORIDE 2000 MG: 2 INJECTION, POWDER, FOR SOLUTION INTRAVENOUS at 18:15

## 2018-07-26 RX ADMIN — SODIUM CHLORIDE 1000 ML: 0.9 INJECTION, SOLUTION INTRAVENOUS at 18:17

## 2018-07-26 RX ADMIN — METRONIDAZOLE 500 MG: 500 INJECTION, SOLUTION INTRAVENOUS at 19:10

## 2018-07-26 NOTE — ED PROVIDER NOTES
History  Chief Complaint   Patient presents with    Wound Infection     pt with non healing wound on his left leg/foot area- has a bone infection in his left heel, pt stated that his MD told him that he needs IV abx an have his foot/ leg amputated      Patient is a 54-year-old male with a past medical history significant for CAD status post CABG x3, diastolic heart failure, PAD, diabetes, chronic nonhealing left foot wound who presents with left heel osteomyelitis  Patient has been following for this chronic left heel wound for many months with Podiatry, he did have an MRI on  that showed osteomyelitis, but the patient wanted to wait until yesterday's cataract surgery in order to come to the hospital for a likely amputation  Denies any fevers, chills, nausea, vomiting, diarrhea  Reports that the wound has been progressively worsening  Assessment and plan:  Diagnosed osteomyelitis on MRI on   Patient has been on Keflex for the last few days  Will check basic labs to evaluate for leukocytosis, electrolyte abnormalities  Will send blood cultures, then start cefepime, vancomycin, Flagyl  Discuss with podiatry and admit  Prior to Admission Medications   Prescriptions Last Dose Informant Patient Reported? Taking?    LANTUS SOLOSTAR 100 units/mL injection pen   No Yes   Sig: INJECT 80 UNITS TOTAL UNDER THE SKIN TWICE A DAY   atorvastatin (LIPITOR) 80 mg tablet   No Yes   Sig: Take 1 tablet (80 mg total) by mouth daily with dinner   cephalexin (KEFLEX) 500 mg capsule   Yes Yes   Sig: Take 500 mg by mouth every 6 (six) hours   clopidogrel (PLAVIX) 75 mg tablet  Self Yes Yes   Si mg daily     docusate sodium (COLACE) 100 mg capsule  Self No Yes   Sig: Take 1 capsule (100 mg total) by mouth 2 (two) times a day   ferrous sulfate 325 (65 Fe) mg tablet  Self No Yes   Sig: Take 1 tablet (325 mg total) by mouth 2 (two) times a day with meals   fludrocortisone (FLORINEF) 0 1 mg tablet  Self No Yes Sig: Take 1 tablet (0 1 mg total) by mouth daily   furosemide (LASIX) 20 mg tablet  Self No Yes   Sig: Take 1 tablet (20 mg total) by mouth daily   glucose blood test strip   No Yes   Si each by Other route daily Use as instructed   insulin glargine (LANTUS) 100 units/mL subcutaneous injection  Self No Yes   Sig: Inject 70 Units under the skin every 12 (twelve) hours   metoclopramide (REGLAN) 5 mg tablet  Self No Yes   Sig: Take 1 tablet (5 mg total) by mouth daily   midodrine (PROAMATINE) 10 MG tablet  Self No Yes   Sig: Take 1 tablet (10 mg total) by mouth 3 (three) times a day with meals   Patient taking differently: Take 5 mg by mouth 3 (three) times a day with meals     tamsulosin (FLOMAX) 0 4 mg  Self No Yes   Sig: Take 1 capsule (0 4 mg total) by mouth 2 (two) times a day      Facility-Administered Medications: None       Past Medical History:   Diagnosis Date    Anemia     Anxiety and depression     Autonomic neuropathy     Cataract     Coronary artery disease     Diabetes mellitus ([facility] )     Diabetic autonomic neuropathy associated with type 2 diabetes mellitus (HCC)     Last Assessed: 2017    Gastroparesis due to DM (HCC)     History of DVT (deep vein thrombosis)     left LE    HTN (hypertension)     Hyperlipidemia     Non healing left heel wound     Obesity     Orthostatic hypotension        Past Surgical History:   Procedure Laterality Date    [place] CABG, ARTERY-VEIN, FOUR N/A 3/28/2018    Procedure: CORONARY ARTERY BYPASS GRAFT (CABG) x3 VESSELS, LIMA TO LAD, SVG--> PDA, SVG--> OM2,  RIGHT LEG EVH/SVH TO , INTRA-OP AMANDEEP;  Surgeon: Lilian Manzano MD;  [facility];  Service: Cardiac Surgery    ROTATOR CUFF REPAIR Bilateral        Family History   Problem Relation Age of Onset    Atrial fibrillation Mother     Stroke Mother     Hypertension Father     Heart attack Paternal Grandfather 61     I have reviewed and agree with the history as documented      Social History Substance Use Topics    Smoking status: Former Smoker     Packs/day: 1 00     Years: 12 00     Types: Cigarettes     Quit date: 3/23/1994    Smokeless tobacco: Never Used    Alcohol use No        Review of Systems   Constitutional: Negative for chills and fever  HENT: Negative for congestion and rhinorrhea  Eyes: Negative for photophobia and visual disturbance  Respiratory: Negative for chest tightness and shortness of breath  Cardiovascular: Negative for chest pain and palpitations  Gastrointestinal: Negative for abdominal pain, diarrhea, nausea and vomiting  Genitourinary: Negative for dysuria and hematuria  Musculoskeletal: Negative for back pain and neck pain  Skin: Positive for wound  Negative for color change, pallor and rash  Neurological: Negative for dizziness, light-headedness and headaches  Physical Exam  ED Triage Vitals   Temperature Pulse Respirations Blood Pressure SpO2   07/26/18 1523 07/26/18 1523 07/26/18 1523 07/26/18 1523 07/26/18 1523   99 1 °F (37 3 °C) 86 16 134/60 98 %      Temp Source Heart Rate Source Patient Position - Orthostatic VS BP Location FiO2 (%)   07/26/18 1523 07/26/18 1742 07/26/18 1742 07/26/18 1742 --   Tympanic Monitor Lying Right arm       Pain Score       07/26/18 1523       No Pain           Orthostatic Vital Signs  Vitals:    07/26/18 1523 07/26/18 1742 07/26/18 1915   BP: 134/60 (!) 191/90 (!) 202/91   Pulse: 86 92 94   Patient Position - Orthostatic VS:  Lying        Physical Exam   Constitutional: He is oriented to person, place, and time  He appears well-developed and well-nourished  No distress  obese   HENT:   Head: Normocephalic and atraumatic  Right Ear: External ear normal    Left Ear: External ear normal    Nose: Nose normal    Mouth/Throat: Oropharynx is clear and moist  No oropharyngeal exudate  Eyes: Conjunctivae and EOM are normal  Pupils are equal, round, and reactive to light  Neck: Normal range of motion   Neck supple  Cardiovascular: Normal rate, regular rhythm, normal heart sounds and intact distal pulses  Exam reveals no gallop and no friction rub  No murmur heard  Pulmonary/Chest: Effort normal and breath sounds normal  No respiratory distress  He has no wheezes  He has no rales  He exhibits no tenderness  Abdominal: Soft  Bowel sounds are normal  He exhibits no distension  There is no tenderness  Musculoskeletal:   Multiple chronic wounds  Please see photo for left heel wound details  Neurological: He is alert and oriented to person, place, and time  Skin: Skin is warm and dry  He is not diaphoretic  No erythema  Psychiatric: He has a normal mood and affect  His behavior is normal    Nursing note and vitals reviewed              ED Medications  Medications   vancomycin (VANCOCIN) 1,750 mg in sodium chloride 0 9 % 500 mL IVPB (1,750 mg Intravenous New Bag 7/26/18 1943)   sodium chloride (PF) 0 9 % injection 3 mL (not administered)   cefepime (MAXIPIME) 2 g/50 mL dextrose IVPB (0 mg Intravenous Stopped 7/26/18 1845)   sodium chloride 0 9 % bolus 1,000 mL (1,000 mL Intravenous New Bag 7/26/18 1817)   metroNIDAZOLE (FLAGYL) IVPB (premix) 500 mg (0 mg Intravenous Stopped 7/26/18 1943)       Diagnostic Studies  Results Reviewed     Procedure Component Value Units Date/Time    Comprehensive metabolic panel [[id number]]  (Abnormal) Collected:  07/26/18 1812    Lab Status:  Final result Specimen:  Blood from Arm, Right Updated:  07/26/18 1858     Sodium 136 mmol/L      Potassium 3 6 mmol/L      Chloride 102 mmol/L      CO2 29 mmol/L      Anion Gap 5 mmol/L      BUN 12 mg/dL      Creatinine 0 93 mg/dL      Glucose 105 mg/dL      Calcium 8 7 mg/dL      AST 15 U/L      ALT 14 U/L      Alkaline Phosphatase 69 U/L      Total Protein 8 0 g/dL      Albumin 2 8 (L) g/dL      Total Bilirubin 0 27 mg/dL      eGFR 90 ml/min/1 73sq m     Narrative:         National Kidney Disease Education Program recommendations are as follows:  GFR calculation is accurate only with a steady state creatinine  Chronic Kidney disease less than 60 ml/min/1 73 sq  meters  Kidney failure less than 15 ml/min/1 73 sq  meters  Lactic acid x2 [[id number]]  (Normal) Collected:  07/26/18 1812    Lab Status:  Final result Specimen:  Blood from Arm, Right Updated:  07/26/18 1858     LACTIC ACID 1 4 mmol/L     Narrative:         Result may be elevated if tourniquet was used during collection  Protime-INR [[id number]]  (Normal) Collected:  07/26/18 1812    Lab Status:  Final result Specimen:  Blood from Arm, Right Updated:  07/26/18 1848     Protime 13 8 seconds      INR 1 05    APTT [[id number]]  (Normal) Collected:  07/26/18 1812    Lab Status:  Final result Specimen:  Blood from Arm, Right Updated:  07/26/18 1848     PTT 29 seconds     CBC and differential [[id number]]  (Abnormal) Collected:  07/26/18 1812    Lab Status:  Final result Specimen:  Blood from Arm, Right Updated:  07/26/18 1830     WBC 10 71 (H) Thousand/uL      RBC 4 57 Million/uL      Hemoglobin 11 5 (L) g/dL      Hematocrit 37 1 %      MCV 81 (L) fL      MCH 25 2 (L) pg      MCHC 31 0 (L) g/dL      RDW 15 4 (H) %      MPV 9 4 fL      Platelets 692 Thousands/uL      nRBC 0 /100 WBCs      Neutrophils Relative 71 %      Immat GRANS % 0 %      Lymphocytes Relative 18 %      Monocytes Relative 7 %      Eosinophils Relative 3 %      Basophils Relative 1 %      Neutrophils Absolute 7 60 Thousands/µL      Immature Grans Absolute 0 04 Thousand/uL      Lymphocytes Absolute 1 97 Thousands/µL      Monocytes Absolute 0 76 Thousand/µL      Eosinophils Absolute 0 29 Thousand/µL      Basophils Absolute 0 05 Thousands/µL     Blood culture #1 [[id number]] Collected:  07/26/18 1812    Lab Status: In process Specimen:  Blood from Arm, Left Updated:  07/26/18 1824    Blood culture #2 [[id number]] Collected:  07/26/18 1812    Lab Status:   In process Specimen:  Blood from Arm, Right Updated:  07/26/18 1824    Lactic acid x2 [[id number]]     Lab Status:  No result Specimen:  Blood     POCT urinalysis dipstick [[id number]]     Lab Status:  No result Specimen:  Urine     Fingerstick Glucose (POCT) [[id number]]  (Normal) Collected:  07/26/18 1738    Lab Status:  Final result Updated:  07/26/18 1739     POC Glucose 109 mg/dl                  No orders to display         Procedures  Procedures      Phone Consults  ED Phone Contact    ED Course  ED Course as of Jul 26 1944   Thu Jul 26, 2018   1930 Case discussed with podiatry resident  Will need to be admitted to [place] [address] with vascular consult for amputation                                MDM  CritCare Time    Disposition  Final diagnoses:   Osteomyelitis ([address] )     Time reflects when diagnosis was documented in both MDM as applicable and the Disposition within this note     Time User Action Codes Description Comment    7/26/2018  5:49 PM Maria Fernanda Saab Add [M86 9] Osteomyelitis [facility])       ED Disposition     ED Disposition Condition Comment    Admit  Case was discussed with Dr Marietta Martinez and the patient's admission status was agreed to be Admission Status: inpatient status to the service of Dr Marietta Martinez   Follow-up Information    None         Patient's Medications   Discharge Prescriptions    No medications on file     No discharge procedures on file  ED Provider  Attending physically available and evaluated Gayatri Henderson I managed the patient along with the ED Attending      Electronically Signed by         Hernando Johnson DO  07/26/18 1947

## 2018-07-26 NOTE — CONSULTS
Consult - Podiatry   Sandra Cui 60 y o  male MRN: [id number]  Unit/Bed#: ED 15 Encounter: [id number]    Assessment/Plan     Assessment:  1  Left heel osteomyelitis with cellulitis secondary to 2  And 3  2  Diabetic neuropathy  3  Peripheral arterial disease  4  Diabetic ulcer, Friend 4  5  Bilateral dried blister to both feet    Plan:  1  At this point patient's best option is below-knee amputation of the left foot  Given the amount of osteomyelitis in the heel and the infectious changes that have occurred combined with his inability to be nonweightbearing to that heel at home all parties agree that limb salvage ability option is extremely low  Dr Naya Sanders was made aware of the patient's declining state earlier today  I will consult vascular surgery  2   Patient has recent extensive cardiac history involving quadruple bypass surgery  He has diabetic retinopathy nephropathy and neuropathy  He will need to be begun on IV antibiotics with a mildly elevated white count today  He has history of MSSA would recommend Ancef and Flagyl  3   I will defer to [facility] and vascular surgery for further care  The heel was dressed with dry sterile dressing  History of Present Illness     HPI:  Sandra Cui is a 60 y o  male who presents with nonhealing left heel ulceration  Patient is well-known to my practice  He has an extensive history regarding the ulceration of the lower extremity  He developed a heel ulceration last winter  While he was being worked up for possible bypass surgery patient had emergent quadruple bypass surgery in his heart  This necessitated the delay of any lower extremity vascular intervention  He has been 3 months in cardiac rehab at [facility] while getting local wound care  He was unable to be off of his foot during that time and the wound continued to necrosis and get worse    He had been following in my office at [address] [place] [address] [facility]  He had been on wound VAC therapy however we had numerous problems with his management due to the fact that he has been unable to stay off of his heel  A recent MRI was just ordered which showed osteomyelitis of his heel  On Tuesdays visit to the wound center there was new foul-smelling drainage, increased necrotic tissue, newly exposed calcaneal bone which was necrotic, and developing cellulitis  I strongly urged air to go to the hospital for admission however he refused to go because on Wednesday he was getting cataract surgery  He stated he would come to the hospital the next day  He did end up getting cataract surgery yesterday and did come to the hospital today  He has been taking oral antibiotics that I had given him out of fear that the infection will get worse  There is no improvement in the cellulitis or foul odor today compared to 2 days ago while he has been on antibiotics  Isaiah Peterson and I have spent numerous hours during wound care visits discussing salvageability of his foot  Given the extensive wound care necessary involving partial calcanectomy, large wound debridement, likely lower extremity bypass surgery, long-term wound care, the odds of saving his limb or extremely low  Patient states he would rather proceed with the below-knee amputation  He has had a lot of time to discuss this with his wife and family  He is still agreeable to proceed with the amputation which is the reason why he came to the hospital     Currently he is concerned about the odor and change to his heel  His wife states that his only been doing this for the last week or 2 and states I was hoping it was getting better  Both patient and his wife would like to proceed with amputation during this hospital stay       Consults  Review of Systems   Constitutional: Negative  HENT: Negative  Eyes:  Status post cataract surgery  Respiratory: Negative  Cardiovascular: Negative      Gastrointestinal: Negative  Musculoskeletal:  Joint stiffness   Skin:  Heel ulcer   Neurological:  Neuropathy      Psych: negative      Historical Information   Past Medical History:   Diagnosis Date    Anemia     Anxiety and depression     Autonomic neuropathy     Cataract     Coronary artery disease     [facility]     Diabetic autonomic neuropathy associated with type 2 diabetes mellitus ([facility] )     Last Assessed: 12/28/2017    Gastroparesis due to DM ([facility] )     History of DVT (deep vein thrombosis)     left LE    HTN (hypertension)     Hyperlipidemia     Non healing left heel wound     Obesity     Orthostatic hypotension      Past Surgical History:   Procedure Laterality Date    [place] CABG, ARTERY-VEIN, FOUR N/A 3/28/2018    Procedure: CORONARY ARTERY BYPASS GRAFT (CABG) x3 VESSELS, LIMA TO LAD, SVG--> PDA, SVG--> OM2,  RIGHT LEG EVH/SVH TO , INTRA-OP AMANDEEP;  Surgeon: Trevon Luis MD;  [facility];  Service: Cardiac Surgery    ROTATOR CUFF REPAIR Bilateral      Social History   History   Alcohol Use No     History   Drug Use No     History   Smoking Status    Former Smoker    Packs/day: 1 00    Years: 12 00    Types: Cigarettes    Quit date: 3/23/1994   Smokeless Tobacco    Never Used     Family History:   Family History   Problem Relation Age of Onset    Atrial fibrillation Mother     Stroke Mother     Hypertension Father     Heart attack Paternal Grandfather 61       Meds/Allergies     (Not in a hospital admission)  Allergies   Allergen Reactions    Metformin        Objective   First Vitals:   Blood Pressure: 134/60 (07/26/18 1523)  Pulse: 86 (07/26/18 1523)  Temperature: 99 1 °F (37 3 °C) (07/26/18 1523)  Temp Source: Tympanic (07/26/18 1523)  Respirations: 16 (07/26/18 1523)  Height: 6' 1" (185 4 cm) (07/26/18 1523)  Weight - Scale: 115 kg (253 lb 8 5 oz) (07/26/18 1523)  SpO2: 98 % (07/26/18 1523)    Current Vitals:   Blood Pressure: (!) 191/90 (07/26/18 1742)  Pulse: 92 (07/26/18 1742)  Temperature: 99 1 °F (37 3 °C) (07/26/18 1523)  Temp Source: Tympanic (07/26/18 1523)  Respirations: 17 (07/26/18 1742)  Height: 6' 1" (185 4 cm) (07/26/18 1523)  Weight - Scale: 115 kg (253 lb 8 5 oz) (07/26/18 1523)  SpO2: 97 % (07/26/18 1742)        BP (!) 191/90 (BP Location: Right arm)   Pulse 92   Temp 99 1 °F (37 3 °C) (Tympanic)   Resp 17   Ht 6' 1" (1 854 m)   Wt 115 kg (253 lb 8 5 oz)   SpO2 97%   BMI 33 45 kg/m²   Physical Exam:  General:    Alert, cooperative, no distress   Head:    Normocephalic, without obvious abnormality, atraumatic   Eyes:    PERRL, conjunctiva/corneas clear, EOM's intact            Nose:   Moist mucous membranes, no drainage or sinus tenderness   Throat:   No tenderness, no exudates   Neck:   Supple, symmetrical, trachea midline, no JVD   Back:     Symmetric, no CVA tenderness   Lungs:     Clear to auscultation bilaterally, respirations unlabored   Chest wall:    No tenderness or deformity   Heart:    Regular rate and rhythm, S1 and S2 normal   Abdomen:     Soft, non-tender, bowel sounds active all four quadrants           Extremities: There is a large left heel ulceration measuring 6 subcutaneously cm with a depth 2 cm  There is exposed necrotic calcaneus  There is a foul odor with heavy serous drainage from the heel  There is surrounding wound cellulitis  There is a dry blood blister to the plantar midfoot of both feet  Neither of these dry blisters appear to have any fluid or sign of infection  Neurologic:   CNII-XII intact   Normal strength, sensation and reflexes       throughout     Lab Results:   Admission on 07/26/2018   Component Date Value    POC Glucose 07/26/2018 109     WBC 07/26/2018 10 71*    RBC 07/26/2018 4 57     Hemoglobin 07/26/2018 11 5*    Hematocrit 07/26/2018 37 1     MCV 07/26/2018 81*    MCH 07/26/2018 25 2*    MCHC 07/26/2018 31 0*    RDW 07/26/2018 15 4*    MPV 07/26/2018 9 4     Platelets 56/37/4218 323     nRBC 07/26/2018 0     Neutrophils Relative 07/26/2018 71     Immat GRANS % 07/26/2018 0     Lymphocytes Relative 07/26/2018 18     Monocytes Relative 07/26/2018 7     Eosinophils Relative 07/26/2018 3     Basophils Relative 07/26/2018 1     Neutrophils Absolute 07/26/2018 7 60     Immature Grans Absolute 07/26/2018 0 04     Lymphocytes Absolute 07/26/2018 1 97     Monocytes Absolute 07/26/2018 0 76     Eosinophils Absolute 07/26/2018 0 29     Basophils Absolute 07/26/2018 0 05     Sodium 07/26/2018 136     Potassium 07/26/2018 3 6     Chloride 07/26/2018 102     CO2 07/26/2018 29     Anion Gap 07/26/2018 5     BUN 07/26/2018 12     Creatinine 07/26/2018 0 93     Glucose 07/26/2018 105     Calcium 07/26/2018 8 7     AST 07/26/2018 15     ALT 07/26/2018 14     Alkaline Phosphatase 07/26/2018 69     Total Protein 07/26/2018 8 0     Albumin 07/26/2018 2 8*    Total Bilirubin 07/26/2018 0 27     eGFR 07/26/2018 90     LACTIC ACID 07/26/2018 1 4     Protime 07/26/2018 13 8     INR 07/26/2018 1 05     PTT 07/26/2018 29      Imaging: I have personally reviewed pertinent films in PACS  Recent MRI shows changes in the plantar calcaneus suggestive of osteomyelitis  EKG, Pathology, and Other Studies: I have personally reviewed pertinent reports  Code Status: Prior  Advance Directive and Living Will:      Power of :    POLST:        Portions of the record may have been created with voice recognition software  Occasional wrong word or "sound a like" substitutions may have occurred due to the inherent limitations of voice recognition software  Read the chart carefully and recognize, using context, where substitutions have occurred

## 2018-07-26 NOTE — ED ATTENDING ATTESTATION
Vito Grewal DO, saw and evaluated the patient  I have discussed the patient with the resident/non-physician practitioner and agree with the resident's/non-physician practitioner's findings, Plan of Care, and MDM as documented in the resident's/non-physician practitioner's note, except where noted  All available labs and Radiology studies were reviewed  At this point I agree with the current assessment done in the Emergency Department  I have conducted an independent evaluation of this patient a history and physical is as follows:    61 yo male presents for evaluation of L heel ulcer resulting in osteomyelitis  Has been evaluated by podiatry for this, recommends admission for IV abx, likely BKA  Pt states he is chronically insensate BLE  No c/o at this time  Denies fever  Will admit to [address] [place] [address] for further eval, surgical consultation          Critical Care Time  CritCare Time    Procedures

## 2018-07-27 LAB
ANION GAP SERPL CALCULATED.3IONS-SCNC: 7 MMOL/L (ref 4–13)
ATRIAL RATE: 79 BPM
BASOPHILS # BLD AUTO: 0.05 THOUSANDS/ΜL (ref 0–0.1)
BASOPHILS NFR BLD AUTO: 1 % (ref 0–1)
BUN SERPL-MCNC: 11 MG/DL (ref 5–25)
CALCIUM SERPL-MCNC: 8.3 MG/DL (ref 8.3–10.1)
CHLORIDE SERPL-SCNC: 105 MMOL/L (ref 100–108)
CO2 SERPL-SCNC: 26 MMOL/L (ref 21–32)
CREAT SERPL-MCNC: 0.73 MG/DL (ref 0.6–1.3)
EOSINOPHIL # BLD AUTO: 0.31 THOUSAND/ΜL (ref 0–0.61)
EOSINOPHIL NFR BLD AUTO: 3 % (ref 0–6)
ERYTHROCYTE [DISTWIDTH] IN BLOOD BY AUTOMATED COUNT: 15.3 % (ref 11.6–15.1)
EST. AVERAGE GLUCOSE BLD GHB EST-MCNC: 180 MG/DL
GFR SERPL CREATININE-BSD FRML MDRD: 102 ML/MIN/1.73SQ M
GLUCOSE SERPL-MCNC: 125 MG/DL (ref 65–140)
GLUCOSE SERPL-MCNC: 150 MG/DL (ref 65–140)
GLUCOSE SERPL-MCNC: 181 MG/DL (ref 65–140)
GLUCOSE SERPL-MCNC: 74 MG/DL (ref 65–140)
GLUCOSE SERPL-MCNC: 75 MG/DL (ref 65–140)
HBA1C MFR BLD: 7.9 % (ref 4.2–6.3)
HCT VFR BLD AUTO: 33.3 % (ref 36.5–49.3)
HGB BLD-MCNC: 10.3 G/DL (ref 12–17)
IMM GRANULOCYTES # BLD AUTO: 0.04 THOUSAND/UL (ref 0–0.2)
IMM GRANULOCYTES NFR BLD AUTO: 0 % (ref 0–2)
LYMPHOCYTES # BLD AUTO: 1.6 THOUSANDS/ΜL (ref 0.6–4.47)
LYMPHOCYTES NFR BLD AUTO: 17 % (ref 14–44)
MAGNESIUM SERPL-MCNC: 1.7 MG/DL (ref 1.6–2.6)
MCH RBC QN AUTO: 24.9 PG (ref 26.8–34.3)
MCHC RBC AUTO-ENTMCNC: 30.9 G/DL (ref 31.4–37.4)
MCV RBC AUTO: 81 FL (ref 82–98)
MONOCYTES # BLD AUTO: 0.72 THOUSAND/ΜL (ref 0.17–1.22)
MONOCYTES NFR BLD AUTO: 8 % (ref 4–12)
NEUTROPHILS # BLD AUTO: 6.69 THOUSANDS/ΜL (ref 1.85–7.62)
NEUTS SEG NFR BLD AUTO: 71 % (ref 43–75)
NRBC BLD AUTO-RTO: 0 /100 WBCS
P AXIS: 57 DEGREES
PHOSPHATE SERPL-MCNC: 3.5 MG/DL (ref 2.7–4.5)
PLATELET # BLD AUTO: 283 THOUSANDS/UL (ref 149–390)
PMV BLD AUTO: 9.4 FL (ref 8.9–12.7)
POTASSIUM SERPL-SCNC: 3.4 MMOL/L (ref 3.5–5.3)
PR INTERVAL: 134 MS
QRS AXIS: -17 DEGREES
QRSD INTERVAL: 88 MS
QT INTERVAL: 400 MS
QTC INTERVAL: 458 MS
RBC # BLD AUTO: 4.13 MILLION/UL (ref 3.88–5.62)
SODIUM SERPL-SCNC: 138 MMOL/L (ref 136–145)
T WAVE AXIS: 109 DEGREES
TSH SERPL DL<=0.05 MIU/L-ACNC: 2.37 UIU/ML (ref 0.36–3.74)
VENTRICULAR RATE: 79 BPM
WBC # BLD AUTO: 9.41 THOUSAND/UL (ref 4.31–10.16)

## 2018-07-27 PROCEDURE — 80048 BASIC METABOLIC PNL TOTAL CA: CPT | Performed by: INTERNAL MEDICINE

## 2018-07-27 PROCEDURE — 93005 ELECTROCARDIOGRAM TRACING: CPT

## 2018-07-27 PROCEDURE — 83735 ASSAY OF MAGNESIUM: CPT | Performed by: INTERNAL MEDICINE

## 2018-07-27 PROCEDURE — 84443 ASSAY THYROID STIM HORMONE: CPT | Performed by: HOSPITALIST

## 2018-07-27 PROCEDURE — 99254 IP/OBS CNSLTJ NEW/EST MOD 60: CPT | Performed by: INTERNAL MEDICINE

## 2018-07-27 PROCEDURE — 99255 IP/OBS CONSLTJ NEW/EST HI 80: CPT | Performed by: SURGERY

## 2018-07-27 PROCEDURE — 83036 HEMOGLOBIN GLYCOSYLATED A1C: CPT | Performed by: HOSPITALIST

## 2018-07-27 PROCEDURE — 93010 ELECTROCARDIOGRAM REPORT: CPT | Performed by: INTERNAL MEDICINE

## 2018-07-27 PROCEDURE — 84100 ASSAY OF PHOSPHORUS: CPT | Performed by: INTERNAL MEDICINE

## 2018-07-27 PROCEDURE — 99255 IP/OBS CONSLTJ NEW/EST HI 80: CPT | Performed by: INTERNAL MEDICINE

## 2018-07-27 PROCEDURE — 99232 SBSQ HOSP IP/OBS MODERATE 35: CPT | Performed by: INTERNAL MEDICINE

## 2018-07-27 PROCEDURE — 82948 REAGENT STRIP/BLOOD GLUCOSE: CPT

## 2018-07-27 PROCEDURE — 85025 COMPLETE CBC W/AUTO DIFF WBC: CPT | Performed by: INTERNAL MEDICINE

## 2018-07-27 RX ORDER — HYDRALAZINE HYDROCHLORIDE 20 MG/ML
5 INJECTION INTRAMUSCULAR; INTRAVENOUS EVERY 6 HOURS PRN
Status: DISCONTINUED | OUTPATIENT
Start: 2018-07-27 | End: 2018-08-08

## 2018-07-27 RX ADMIN — METOCLOPRAMIDE HYDROCHLORIDE 5 MG: 10 TABLET ORAL at 09:31

## 2018-07-27 RX ADMIN — INSULIN LISPRO 1 UNITS: 100 INJECTION, SOLUTION INTRAVENOUS; SUBCUTANEOUS at 21:01

## 2018-07-27 RX ADMIN — CEFAZOLIN SODIUM 2000 MG: 2 SOLUTION INTRAVENOUS at 17:49

## 2018-07-27 RX ADMIN — Medication 325 MG: at 16:04

## 2018-07-27 RX ADMIN — ATORVASTATIN CALCIUM 80 MG: 80 TABLET, FILM COATED ORAL at 16:04

## 2018-07-27 RX ADMIN — VANCOMYCIN HYDROCHLORIDE 1750 MG: 10 INJECTION, POWDER, LYOPHILIZED, FOR SOLUTION INTRAVENOUS at 08:12

## 2018-07-27 RX ADMIN — METRONIDAZOLE 500 MG: 500 INJECTION, SOLUTION INTRAVENOUS at 08:11

## 2018-07-27 RX ADMIN — FLUDROCORTISONE ACETATE 0.1 MG: 0.1 TABLET ORAL at 09:32

## 2018-07-27 RX ADMIN — CEFEPIME HYDROCHLORIDE 2000 MG: 2 INJECTION, POWDER, FOR SOLUTION INTRAVENOUS at 08:11

## 2018-07-27 RX ADMIN — INSULIN GLARGINE 70 UNITS: 100 INJECTION, SOLUTION SUBCUTANEOUS at 09:40

## 2018-07-27 RX ADMIN — MIDODRINE HYDROCHLORIDE 5 MG: 5 TABLET ORAL at 16:04

## 2018-07-27 RX ADMIN — MIDODRINE HYDROCHLORIDE 5 MG: 5 TABLET ORAL at 11:36

## 2018-07-27 RX ADMIN — HYDRALAZINE HYDROCHLORIDE 5 MG: 20 INJECTION INTRAMUSCULAR; INTRAVENOUS at 18:25

## 2018-07-27 RX ADMIN — FUROSEMIDE 20 MG: 20 TABLET ORAL at 09:31

## 2018-07-27 RX ADMIN — INSULIN LISPRO 2 UNITS: 100 INJECTION, SOLUTION INTRAVENOUS; SUBCUTANEOUS at 11:30

## 2018-07-27 RX ADMIN — VANCOMYCIN HYDROCHLORIDE 1750 MG: 10 INJECTION, POWDER, LYOPHILIZED, FOR SOLUTION INTRAVENOUS at 20:49

## 2018-07-27 RX ADMIN — Medication 325 MG: at 08:11

## 2018-07-27 RX ADMIN — METRONIDAZOLE 500 MG: 500 INJECTION, SOLUTION INTRAVENOUS at 16:04

## 2018-07-27 RX ADMIN — MIDODRINE HYDROCHLORIDE 5 MG: 5 TABLET ORAL at 08:11

## 2018-07-27 RX ADMIN — CLOPIDOGREL BISULFATE 75 MG: 75 TABLET, FILM COATED ORAL at 09:31

## 2018-07-27 RX ADMIN — INSULIN GLARGINE 70 UNITS: 100 INJECTION, SOLUTION SUBCUTANEOUS at 21:01

## 2018-07-27 RX ADMIN — DOCUSATE SODIUM 100 MG: 100 CAPSULE, LIQUID FILLED ORAL at 17:49

## 2018-07-27 RX ADMIN — ENOXAPARIN SODIUM 40 MG: 40 INJECTION SUBCUTANEOUS at 09:31

## 2018-07-27 RX ADMIN — DOCUSATE SODIUM 100 MG: 100 CAPSULE, LIQUID FILLED ORAL at 09:31

## 2018-07-27 RX ADMIN — TAMSULOSIN HYDROCHLORIDE 0.4 MG: 0.4 CAPSULE ORAL at 16:04

## 2018-07-27 NOTE — CONSULTS
Consultation - Infectious Disease   Claude Burnham 60 y o  male MRN: [id number]  Unit/Bed#: [place] 724-01 Encounter: [id number]      IMPRESSION & RECOMMENDATIONS:   Impression/Recommendations:  1  Left heel chronic diabetic ulcer with underlying osteomyelitis/superinfection  No wound cultures available  Consider GPC/GNRs/anaerobes  Foot deemed nonsalvageable per podiatry  Clinically stable without sepsis  Rec:  · Narrow antibiotics to cefazolin/Flagyl  · Follow-up blood cultures from admission  · Await BKA next week which should offer definitive cure  · Anticipate discontinuing antibiotics postoperatively  · Follow temperatures closely  · Check CBC in a m  · Supportive care as per the primary service    2  DM with DPN  A1C 7 9  Rec:  · Continue management as per the primary service    3  PAD   Status post recent Agram/TPA  Rec:  · BKA next week  · Close vascular surgery follow-up ongoing    The patient is stable from an ID standpoint  I will reassess the patient on Monday 7/30  Please call in the interim with new questions  Antibiotics:  Cefepime/Flagyl # 2    Thank you for this consultation  We will follow along with you  HISTORY OF PRESENT ILLNESS:  Reason for Consult:  Osteomyelitis    HPI: Claude Burnham is a 60 y o  male history of a chronic left heel diabetic ulcer as well as PAD status post recent arteriogram/PTA on 06/07/2018  In review of the Podiatry Shaka Soto sole wounds note new peers this wound has been slowly deteriorating eating over time due to patient noncompliance with nonweightbearing  A recent MRI was performed which showed osteomyelitis of the calcaneus  He was seen earlier this week in the [address] [facility] and noted to have increased necrotic tissue, newly exposed calcaneal bone, and foul-smelling drainage  He was also noted to have increasing cellulitic changes    He was placed on oral antibiotics and referred to the emergency room but avoided this due to cataract surgery which she underwent on Wednesday  He subsequently came to the emergency department yesterday  Upon presentation he was noted to be afebrile with a normal heart rate and WBC  He was placed on cefazolin and Flagyl  He was evaluated again by Podiatry who have deemed the foot nonsalvageable and have recommended BKA to which the patient is agreeable and is pending for next week  We are asked to comment on further antibiotic management  REVIEW OF SYSTEMS:  Denies fevers, chills, sweats, nausea, vomiting, or diarrhea  A complete system-based review of systems is otherwise negative  PAST MEDICAL HISTORY:  Past Medical History:   Diagnosis Date    Anemia     Anxiety and depression     Autonomic neuropathy     Cataract     Coronary artery disease     [facility]     Diabetic autonomic neuropathy associated with type 2 diabetes mellitus ([facility] )     Last Assessed: 12/28/2017    Gastroparesis due to DM ([facility])     History of DVT (deep vein thrombosis)     left LE    HTN (hypertension)     Hyperlipidemia     Non healing left heel wound     Obesity     Orthostatic hypotension      Past Surgical History:   Procedure Laterality Date    [place] CABG, ARTERY-VEIN, FOUR N/A 3/28/2018    Procedure: CORONARY ARTERY BYPASS GRAFT (CABG) x3 VESSELS, LIMA TO LAD, SVG--> PDA, SVG--> OM2,  RIGHT LEG EVH/SVH TO , INTRA-OP AMANDEEP;  Surgeon: Kathe Sands MD;  [facility];  Service: Cardiac Surgery    ROTATOR CUFF REPAIR Bilateral        FAMILY HISTORY:  Non-contributory    SOCIAL HISTORY:  History   Alcohol Use No     History   Drug Use No     History   Smoking Status    Former Smoker    Packs/day: 1 00    Years: 12 00    Types: Cigarettes    Quit date: 3/23/1994   Smokeless Tobacco    Never Used       ALLERGIES:  Allergies   Allergen Reactions    Metformin        MEDICATIONS:  All current active medications have been reviewed      PHYSICAL EXAM:  Vitals:  HR:  [72-94] 72  Resp:  [16-18] 16  BP: (134-202)/(60-91) 168/80  SpO2:  [92 %-98 %] 95 %  Temp (24hrs), Av 6 °F (37 °C), Min:98 °F (36 7 °C), Max:99 1 °F (37 3 °C)  Current: Temperature: 98 °F (36 7 °C)     Physical Exam:  General:  Well-nourished, well-developed, in no acute distress  Eyes:  Conjunctive clear with no hemorrhages or effusions  Oropharynx:  No ulcers, no lesions  Neck:  Supple, no lymphadenopathy  Lungs:  Clear to auscultation bilaterally, no accessory muscle use  Cardiac:  Regular rate and rhythm, no murmurs  Abdomen:  Soft, non-tender, non-distended, obese  Extremities:  No peripheral cyanosis, clubbing  Nonpitting edema left leg  Skin:  No rashes, no ulcers  Neurological:  Moves all four extremities spontaneously, sensation grossly intact  Left foot:  Wound pics reveiwed  Necrotic ulcer with necrotic/fibrinous exudate with surrounding erythema  Noted to malodorous on my exam    LABS, IMAGING, & OTHER STUDIES:  Lab Results:  I have personally reviewed pertinent labs  Results from last 7 days  Lab Units 18  0606 18  1812   SODIUM mmol/L 138 136   POTASSIUM mmol/L 3 4* 3 6   CHLORIDE mmol/L 105 102   CO2 mmol/L 26 29   ANION GAP mmol/L 7 5   BUN mg/dL 11 12   CREATININE mg/dL 0 73 0 93   EGFR ml/min/1 73sq m 102 90   GLUCOSE RANDOM mg/dL 75 105   CALCIUM mg/dL 8 3 8 7   AST U/L  --  15   ALT U/L  --  14   ALK PHOS U/L  --  69   TOTAL PROTEIN g/dL  --  8 0   BILIRUBIN TOTAL mg/dL  --  0 27       Results from last 7 days  Lab Units 18  0606 18  1812   WBC Thousand/uL 9 41 10 71*   HEMOGLOBIN g/dL 10 3* 11 5*   PLATELETS Thousands/uL 283 323         MRSA cultures negative  No other wound cultures available in the system    Imaging Studies:   I have personally reviewed pertinent imaging study reports and images in PACS  MRI left foot  calcaneal osteomyelitis    EKG, Pathology, and Other Studies:   I have personally reviewed pertinent reports

## 2018-07-27 NOTE — SOCIAL WORK
Patient identified as HRR per criteria  Call made to DC appointment hotline with information as required for CM support follow up      Outpt Care mgt referral made

## 2018-07-27 NOTE — H&P
H&P- Jefferson Castro 1960, 60 y o  male MRN: [id number]    Unit/Bed#: [facility] 724-01 Encounter: [id number]    Primary Care Provider: Tatyana Davies DO   Date and time admitted to hospital: 7/26/2018  5:03 PM        * Non-healing wound of left heel   Assessment & Plan    Patient was recently seen by Podiatry and was also advised that needs to be seen by vascular surgery  Vascular surgery follow-up  Will continue with antibiotics cefepime vancomycin and metronidazole for now  Diabetic ulcer of left heel associated with type 2 diabetes mellitus, limited to breakdown of skin [facility])   Assessment & Plan    Lab Results   Component Value Date    HGBA1C 9 4 (H) 03/14/2018    Hemoglobin A1c for tomorrow  Check blood sugars before meals and bedtime  Insulin sliding scale  Recent Labs      07/26/18   1738  07/26/18   2113   POCGLU  109  125       Blood Sugar Average: Last 72 hrs:  (P) 117        Bilateral lower extremity edema   Assessment & Plan    Bilateral lower extremity edema seems to be stable at this point  PAD (peripheral artery disease) [facility])   Assessment & Plan    Vascular surgery follow-up  Triple vessel coronary artery disease   Assessment & Plan    Patient is currently asymptomatic  VTE Prophylaxis: Enoxaparin (Lovenox)  / sequential compression device   Code Status: Level 1 - Full Code as discussed with patient  POLST: There is no POLST form on file for this patient (pre-hospital)    Anticipated Length of Stay:  Patient will be admitted on an Inpatient basis with an anticipated length of stay of  greater than 2 midnights  Justification for Hospital Stay: Please see detailed plans noted above  Chief Complaint:     Nonhealing diabetic foot ulcer  History of Present Illness:  Jefferson Catsro is a 60 y o  male who has a history of diabetes and peripheral vascular disease  Patient has been seen by both vascular and Podiatry    According to him, the patient had been seen by vascular in the past and occlusion has been recanalized  Please see notes of vascular on June 7, 2018  According to patient this has helped with the circulation and with healing  However recently it seems that the wound is getting worse  Patient was seen by Podiatry and was prescribed Keflex  However due to the worsening status, the patient was advised to go to emergency room  He was then admitted for antibiotic treatment  The patient denies any fever  No chest pains  No chills  Currently, patient is generally comfortable  The patient denies any pain as of the moment  Review of Systems:    Constitutional:  Denies fever or chills   Eyes:  Denies change in visual acuity   HENT:  Denies nasal congestion or sore throat   Respiratory:  Denies cough or shortness of breath   Cardiovascular:  Denies chest pain or edema   GI:  Denies abdominal pain, nausea, vomiting, bloody stools or diarrhea   :  Denies dysuria   Musculoskeletal:  Denies back pain or joint pain   Integument:  Denies rash but with nonhealing ulcer at the left foot heel    Neurologic:  Denies headache, focal weakness or sensory changes   Endocrine:  Denies polyuria or polydipsia   Lymphatic:  Denies swollen glands   Psychiatric:  Denies depression or anxiety     Past Medical and Surgical History:   Past Medical History:   Diagnosis Date    Anemia     Anxiety and depression     Autonomic neuropathy     Cataract     Coronary artery disease     [facility]     Diabetic autonomic neuropathy associated with type 2 diabetes mellitus ([facility] )     Last Assessed: 12/28/2017    Gastroparesis due to DM [facility]     History of DVT (deep vein thrombosis)     left LE    HTN (hypertension)     Hyperlipidemia     Non healing left heel wound     Obesity     Orthostatic hypotension      Past Surgical History:   Procedure Laterality Date    AK CABG, ARTERY-VEIN, FOUR N/A 3/28/2018    Procedure: CORONARY ARTERY BYPASS GRAFT (CABG) x3 VESSELS, LIMA TO LAD, SVG--> PDA, SVG--> OM2,  RIGHT LEG EVH/SVH TO , INTRA-OP AMANDEEP;  Surgeon: Maisha Saavedra MD;  [facility];  Service: Cardiac Surgery    ROTATOR CUFF REPAIR Bilateral        Meds/Allergies:  Prescriptions Prior to Admission   Medication    atorvastatin (LIPITOR) 80 mg tablet    cephalexin (KEFLEX) 500 mg capsule    clopidogrel (PLAVIX) 75 mg tablet    docusate sodium (COLACE) 100 mg capsule    ferrous sulfate 325 (65 Fe) mg tablet    fludrocortisone (FLORINEF) 0 1 mg tablet    furosemide (LASIX) 20 mg tablet    glucose blood test strip    insulin glargine (LANTUS) 100 units/mL subcutaneous injection    LANTUS SOLOSTAR 100 units/mL injection pen    metoclopramide (REGLAN) 5 mg tablet    midodrine (PROAMATINE) 10 MG tablet    tamsulosin (FLOMAX) 0 4 mg       Allergies:    Allergies   Allergen Reactions    Metformin      History:  Marital Status: /Civil Union   Occupation:  Currently retired  Patient Pre-hospital Living Situation:  Lives at home  Patient Pre-hospital Level of Mobility:  Ambulatory  Patient Pre-hospital Diet Restrictions:  Cardiac and diabetic  Substance Use History:   History   Alcohol Use No     History   Smoking Status    Former Smoker    Packs/day: 1 00    Years: 12 00    Types: Cigarettes    Quit date: 3/23/1994   Smokeless Tobacco    Never Used     History   Drug Use No       Family History:  Family History   Problem Relation Age of Onset    Atrial fibrillation Mother     Stroke Mother     Hypertension Father     Heart attack Paternal Grandfather 61       Physical Exam:     Vitals:   Blood Pressure: 143/61 (07/26/18 2040)  Pulse: 83 (07/26/18 2040)  Temperature: 98 9 °F (37 2 °C) (07/26/18 2040)  Temp Source: Oral (07/26/18 2040)  Respirations: 18 (07/26/18 2040)  Height: 6' 1" (185 4 cm) (07/26/18 1523)  Weight - Scale: 115 kg (253 lb 8 5 oz) (07/26/18 1523)  SpO2: 97 % (07/26/18 2040)    Constitutional:  Well developed, well nourished, no acute distress, non-toxic appearance   Eyes:  PERRL, conjunctiva normal   HENT:  Atraumatic, external ears normal, nose normal, oropharynx moist, no pharyngeal exudates  Neck- normal range of motion, no tenderness, supple   Respiratory:  No respiratory distress, normal breath sounds, no rales, no wheezing   Cardiovascular:  Normal rate, normal rhythm, no murmurs, no gallops, no rubs   GI:  Soft, nondistended, normal bowel sounds, nontender, no organomegaly, no mass, no rebound, no guarding   :  No costovertebral angle tenderness   Musculoskeletal:  No edema, no tenderness, no deformities  Back- no tenderness  Integument:  Patient's bilateral feet are currently wrapped in gauze and according to patient was just recently inspected  Patient does not want to have it on wrapped at this point  No note of any redness of the distal bilateral feet  Edema very minimal     Lymphatic:  No lymphadenopathy noted   Neurologic:  Alert &awake, communicative, CN 2-12 normal, normal motor function, normal sensory function, no focal deficits noted   Psychiatric:  Speech and behavior appropriate       Lab Results: I have personally reviewed pertinent reports  Results from last 7 days  Lab Units 07/26/18  1812   WBC Thousand/uL 10 71*   HEMOGLOBIN g/dL 11 5*   HEMATOCRIT % 37 1   PLATELETS Thousands/uL 323   NEUTROS PCT % 71   LYMPHS PCT % 18   MONOS PCT % 7   EOS PCT % 3       Results from last 7 days  Lab Units 07/26/18  1812   SODIUM mmol/L 136   POTASSIUM mmol/L 3 6   CHLORIDE mmol/L 102   CO2 mmol/L 29   BUN mg/dL 12   CREATININE mg/dL 0 93   CALCIUM mg/dL 8 7   TOTAL PROTEIN g/dL 8 0   BILIRUBIN TOTAL mg/dL 0 27   ALK PHOS U/L 69   ALT U/L 14   AST U/L 15   GLUCOSE RANDOM mg/dL 105       Results from last 7 days  Lab Units 07/26/18  1812   INR  1 05       No new EKG at this point  Imaging: I have personally reviewed pertinent reports        Xr Heel / Calcaneus 2+ Vw Left    Result Date: 7/5/2018  Narrative: LEFT HEEL INDICATION:   L97 529: Non-pressure chronic ulcer of other part of left foot with unspecified severity  Left heel ulcer, nonhealing for the last 6 months  Patient is diabetic  COMPARISON:  Left foot plain films from 5/23/2018  VIEWS:  XR HEEL / CALCANEUS 2+ VW LEFT FINDINGS: Again seen is a plantar heel ulcer  There are developing cortical lucencies in the adjacent plantar calcaneal tubercle highly suspicious for osteomyelitis  There is a large retrocalcaneal spur  No lytic or blastic lesions seen  There are atherosclerotic calcifications  Soft tissues are otherwise unremarkable  Impression: Again seen is a plantar heel ulcer  There are developing cortical lucencies in the adjacent plantar calcaneal tubercle highly suspicious for osteomyelitis  Workstation performed: [id number]     Mri Ankle/heel Left  Wo Contrast    Result Date: 7/17/2018  Narrative: MRI LEFT ANKLE - WITHOUT CONTRAST INDICATION:   B99 9: Unspecified infectious disease  COMPARISON: Plain film dated 7/3/2018  TECHNIQUE:   MR sequences were obtained of the left ankle including: Localizers, coronal STIR, coronal T1, axial T2 fat sat, axial PD, sagittal T2 fat sat, sagittal T1 sequences were obtained  Images were acquired on a1 5 Joanne Unit  Gadolinium was not used  FINDINGS: SUBCUTANEOUS TISSUES: Prominent dorsal plantar skin ulceration with mild reactive edema consistent with cellulitis  JOINT EFFUSION: None  TENDONS: Achilles tendon: Normal  Peroneus brevis and longus: Normal  Posterior tibialis, flexor digitorum longus, flexor hallucis longus: Normal  Anterior tibialis, extensor digitorum longus, extensor hallucis longus:  Normal  LIGAMENTS: Lateral collateral ligament complex:  Normal  Distal tibiofibular syndesmosis:  Normal  Medial collateral ligament complex:  Normal  PLANTAR FASCIA:  Normal  ARTICULAR SURFACES:  Normal  SINUS TARSI:  Normal  Tarsal Tunnel: Unremarkable   BONES:  Erosive change at the dorsal plantar aspect of the calcaneus with reactive marrow edema as well as confluent T1 marrow replacement signal best seen on 601/11 consistent with osteomyelitis  No abscess formation  Mild hindfoot degenerative changes noted  MUSCULATURE:  Grade 1 strain pattern involving the common peroneal and medial compartment musculature  Impression: Dorsal and plantar calcaneal cortical erosion with replacement marrow signal consistent with osteomyelitis  This occurs in the region of prominent skin ulceration and reactive cellulitis  No gross evidence of abscess formation  I personally discussed this study with Anabel Morgan on 7/17/2018 at 11:08 AM   Workstation performed: [id number]     Vas Lower Limb Arterial Duplex, Limited/unilateral    Result Date: 7/5/2018  Narrative:  [facility] REPORT CLINICAL: Indications:  Type 2 diabetes mellitus with foot ulcer [E11 621]  Patient s/p recanalization of chronically occluded left popliteal artery using standard and drug coated balloon angioplasty Operative History: No prior heart or vascular surgery Risk Factors The patient has history of Obesity, HTN, Diabetes (Yes), hyperlipidemia, PAD and CAD  Clinical Right Pressure:  162/ mm Hg, Left Pressure:  143/ mm Hg  FINDINGS:  Segment                Right     Left                                          Waveform  Impression  Waveform    PSV  EDV  Post  Tibial           Biphasic              Monophasic            Ant   Tibial            Biphasic              Monophasic            Common Femoral Artery                                    164   16  Prox Profunda                                            193   19  Prox SFA                                                 167   24  Mid SFA                                                  164   25  Dist SFA                                                 233   36  Proximal Pop                                             235   34  Distal Pop                       50-75%                  [id number]  Tibial                                        135   32  Dist Peroneal                                             74   20     CONCLUSION:  Impression: Right Lower Limb YUDITH: 1 2, Normal range  Previous study 1 3  Right Metatarsal Pressure: 196 mm Hg  and previous 162  mm Hg  Right Great Toe Pressure 126 mm Hg  Above healing threshold for diabetic  Previous study 97 mm Hg  Left Lower Limb Evaluation shows a 50-75% stenosis is in the distal popliteal artery with diffuse atherosclerotic disease noted throughout the remaining portions of the femoropopliteal arteries  Findings suggest tibio-peroneal disease  YUDITH: 1 0  Normal range  However, may be unreliable due to poorly compressible vessels  Previous 0 94  Left Metatarsal Pressure: 240 mm and unreliable, Previous 67  mm Hg Left Great Toe Pressure 82 mm Hg  Above healing threshold for diabetic  Previous study 62 mm Hg  In comparison to the study of 05-24-18, there is no significant change on the right  There is improvement in the disease process on the left s/p intervention  SIGNATURE: Electronically Signed by: Fay Lugo MD on 2018-07-05 03:52:29 PM        ** Please Note: Dragon 360 Dictation voice to text software was used in the creation of this document   **

## 2018-07-27 NOTE — CONSULTS
Consult Note - Vascular Surgery   Yakov Kitchen 60 y o  male MRN: [id number]    Unit/Bed#: [facility] 724-01 Encounter: [id number]    Assessment:  53-year-old male with PMH of DM type 2, PAD, CAD (s/p CABG 3/28/18), and CHF, that presents with left heel osteomyelitis    *s/p LLE agram with recanalization of popliteal artery (DOS: 6/8/18)    Plan:  - left foot is deemed nonsalvageable per Podiatry  Podiatry recommending left BKA  - patient is in agreement to left BKA  Will consult Cardiology for clearance  Surgery will be scheduled sometime next week pending Cards clearance  - The benefits, risks, and alternatives of the surgery were discussed with the patient  - All questions/concerns were answered  No guarantees given to outcome of procedure  - Will discuss plan with attending and update as needed  Consulting Service:  Podiatry    Chief Complaint:  Left heel osteomyelitis    HPI: Yakov Kitchen is a 60 y o  male with a PMH of DM type 2, PAD, CAD (s/p CABG 3/28/18), and CHF, presents with left heel osteomyelitis  He was seen by both vascular and Podiatry in the past   He states he had open heart surgery in March of 2018, as well as a left lower extremity angiogram in June of 2018  He states that the vascular surgeon told him he was successful in opening up the artery  He has been dealing with this nonhealing left heel wound since January of 2018  He states that the wound has never seem to get better  He notices that the heel wound is getting worse  He was started on Keflex by Podiatry, but saw no improvement  He states that podiatry was recommending a below-the-knee amputation, and he is in agreement to the plan  He denies any recent nausea, vomiting, fever, chills, shortness of breath, chest pains      Review of Systems:  General: negative  Cardiovascular: no chest pain or dyspnea on exertion  Respiratory: no cough, shortness of breath, or wheezing  Gastrointestinal: no abdominal pain, change in bowel habits, or black or bloody stools  Genitourinary ROS: no dysuria, trouble voiding, or hematuria  Musculoskeletal ROS: negative  Neurological ROS: no TIA or stroke symptoms  Hematological and Lymphatic ROS: negative  Dermatological ROS:  Left heel wound  Psychological ROS: negative  Ophthalmic ROS: negative  ENT ROS: negative    Past Medical History:  Past Medical History:   Diagnosis Date    Anemia     Anxiety and depression     Autonomic neuropathy     Cataract     Coronary artery disease     Diabetes mellitus ([place] [address] )     Diabetic autonomic neuropathy associated with type 2 diabetes mellitus ([place] [address] )     Last Assessed: 12/28/2017    Gastroparesis due to DM ([facility] )     History of DVT (deep vein thrombosis)     left LE    HTN (hypertension)     Hyperlipidemia     Non healing left heel wound     Obesity     Orthostatic hypotension        Past Surgical History:  Past Surgical History:   Procedure Laterality Date    AK CABG, ARTERY-VEIN, FOUR N/A 3/28/2018    Procedure: CORONARY ARTERY BYPASS GRAFT (CABG) x3 VESSELS, LIMA TO LAD, SVG--> PDA, SVG--> OM2,  RIGHT LEG EVH/SVH TO , INTRA-OP AMANDEEP;  Surgeon: Orion Amaya MD;  Location: [facility];  Service: Cardiac Surgery    ROTATOR CUFF REPAIR Bilateral        Social History:  History   Alcohol Use No     History   Drug Use No     History   Smoking Status    Former Smoker    Packs/day: 1 00    Years: 12 00    Types: Cigarettes    Quit date: 3/23/1994   Smokeless Tobacco    Never Used       Family History:  Family History   Problem Relation Age of Onset    Atrial fibrillation Mother     Stroke Mother     Hypertension Father     Heart attack Paternal Grandfather 61       Allergies:   Allergies   Allergen Reactions    Metformin        Medications:  Current Facility-Administered Medications   Medication Dose Route Frequency    acetaminophen (TYLENOL) tablet 650 mg  650 mg Oral Q6H PRN    aluminum-magnesium hydroxide-simethicone (MYLANTA) 200-200-20 mg/5 mL oral suspension 15 mL  15 mL Oral Q6H PRN    atorvastatin (LIPITOR) tablet 80 mg  80 mg Oral Daily With Dinner    bromfenac sodium 0 07 % SOLN 1 drop  1 drop Right Eye Daily    Carboxymeth-Glyc-Polysorb PF 0 5-1-0 5 % SOLN 1 drop  1 drop Right Eye TID    cefepime (MAXIPIME) 2 g/50 mL dextrose IVPB  2,000 mg Intravenous Q12H    clopidogrel (PLAVIX) tablet 75 mg  75 mg Oral Daily    docusate sodium (COLACE) capsule 100 mg  100 mg Oral BID    enoxaparin (LOVENOX) subcutaneous injection 40 mg  40 mg Subcutaneous Daily    ferrous sulfate tablet 325 mg  325 mg Oral BID With Meals    fludrocortisone (FLORINEF) tablet 0 1 mg  0 1 mg Oral Daily    furosemide (LASIX) tablet 20 mg  20 mg Oral Daily    insulin glargine (LANTUS) subcutaneous injection 70 Units 0 7 mL  70 Units Subcutaneous Q12H [address]    insulin lispro (HumaLOG) 100 units/mL subcutaneous injection 1-5 Units  1-5 Units Subcutaneous HS    insulin lispro (HumaLOG) 100 units/mL subcutaneous injection 2-12 Units  2-12 Units Subcutaneous TID AC    metoclopramide (REGLAN) tablet 5 mg  5 mg Oral Daily    metroNIDAZOLE (FLAGYL) IVPB (premix) 500 mg  500 mg Intravenous Q8H    midodrine (PROAMATINE) tablet 5 mg  5 mg Oral TID With Meals    sodium chloride (PF) 0 9 % injection 3 mL  3 mL Intravenous PRN    tamsulosin (FLOMAX) capsule 0 4 mg  0 4 mg Oral Daily With Dinner    vancomycin (VANCOCIN) 1,750 mg in sodium chloride 0 9 % 500 mL IVPB  15 mg/kg Intravenous Q12H       Vitals:  /80 (BP Location: Right arm)   Pulse 72   Temp 98 °F (36 7 °C) (Oral)   Resp 16   Ht 6' 1" (1 854 m)   Wt 115 kg (253 lb 8 5 oz)   SpO2 95%   BMI 33 45 kg/m²     I/Os:  No intake/output data recorded      Lab Results and Cultures:   Lab Results   Component Value Date    WBC 9 41 07/27/2018    HGB 10 3 (L) 07/27/2018    HCT 33 3 (L) 07/27/2018    MCV 81 (L) 07/27/2018     07/27/2018     Lab Results   Component Value Date    GLUCOSE 75 07/27/2018    CALCIUM 8 3 07/27/2018     07/27/2018    K 3 4 (L) 07/27/2018    CO2 26 07/27/2018     07/27/2018    BUN 11 07/27/2018    CREATININE 0 73 07/27/2018     Lab Results   Component Value Date    INR 1 05 07/26/2018    INR 1 13 05/23/2018    INR 1 11 05/22/2018    PROTIME 13 8 07/26/2018    PROTIME 14 6 (H) 05/23/2018    PROTIME 14 4 (H) 05/22/2018       Lipid Panel:   Lab Results   Component Value Date    CHOL 85 03/16/2018    CHOL 214 (H) 09/13/2016   ,     Blood Culture:   Lab Results   Component Value Date    BLOODCX No Growth After 5 Days  05/23/2018   ,   Urinalysis:   Lab Results   Component Value Date    COLORU Yellow 05/23/2018    CLARITYU Clear 05/23/2018    SPECGRAV 1 020 05/23/2018    PHUR 7 5 05/23/2018    LEUKOCYTESUR Negative 05/23/2018    NITRITE Negative 05/23/2018    PROTEINUA 30 (1+) (A) 05/23/2018    GLUCOSEU Negative 05/23/2018    KETONESU Negative 05/23/2018    BILIRUBINUR Negative 05/23/2018    BLOODU Small (A) 05/23/2018   ,   Urine Culture: No results found for: URINECX,   Wound Culure: No results found for: WOUNDCULT    Physical Exam:    General appearance: alert and oriented, in no acute distress  Skin:  Left heel wound noted  Neurologic: Alert and oriented X 3, normal strength and tone  Normal symmetric reflexes  Normal coordination and gait  Head: Normocephalic, without obvious abnormality, atraumatic  Eyes: conjunctivae/corneas clear  PERRL, EOM's intact  Fundi benign  Throat: lips, mucosa, and tongue normal; teeth and gums normal  Neck: no adenopathy, no carotid bruit, no JVD, supple, symmetrical, trachea midline and thyroid not enlarged, symmetric, no tenderness/mass/nodules  Back: symmetric, no curvature  ROM normal  No CVA tenderness    Lungs: clear to auscultation bilaterally  Chest wall: no tenderness  Heart: regular rate and rhythm, S1, S2 normal, no murmur, click, rub or gallop  Abdomen: soft, non-tender; bowel sounds normal; no masses,  no organomegaly  Extremities:   Bilateral lower extremity edema noted  Patient has feet bilaterally wrapped in a dressing which is clean, dry, and intact  Left heel wound noted  Wound probes to bone        Left heel wound      Pulse exam:  Femoral: Right: 2+ Left: 2+  Popliteal: Right: 1+ Left: 1+  DP: Right: 1+ Left: doppler signal and non-palpable  PT: Right: doppler signal and non-palpable Left: doppler signal and non-palpable      Kevin Chaney DPM  7/27/2018

## 2018-07-27 NOTE — SOCIAL WORK
Met with pt and explained Cm role  Pt lives with his wife in a 2 story house with 3 VITOR and 1st floor setup with a 1/2 bath on 1st floor  Pt was independent PTA and does not drive  Pt's family provides pt with transportation home upon discharge  Pt's PCP is Edna Pretty  Pt has a walker, cane, quad cane, hospital bed, and w/c at home  Pt stated that his home is not handicap accessible  Pt is open to  [facility] for RN services  Pt has a hx of [facility]  No hx of mental health issues or drug or alcohol use  Pt ahs a prescription plan and uses [facility] in [place] for his prescriptions  Primary contact is pt's wife Luke Srivastava, contact # [phone]  Pt's wife will provide pt with transportation home upon discharge  Pt does not have a POA  Pt inquired about rehab options upon discharge  Discussed acute and subacute rehab  Discussed  [facility] and pt inquired about [facility]  Subacute rehab list provided to pt to review  Discharge plan pending therapy evals and treatment plan  Will follow for discharge needs

## 2018-07-27 NOTE — CONSULTS
Spoke with RN, patient already has consults for podiatry and vascular to his LE wound  No other wound or pressure ulcers  Wound care is signing off, please call ext [phone] or [phone] with questions or concerns      Veronica Poole RN, BSN, [facility]

## 2018-07-27 NOTE — ASSESSMENT & PLAN NOTE
Lab Results   Component Value Date    HGBA1C 7 9 (H) 07/27/2018    Hemoglobin A1c for tomorrow  Check blood sugars before meals and bedtime  Insulin sliding scale      Recent Labs      07/26/18   1738  07/26/18   2113  07/27/18   0646  07/27/18   1039   POCGLU  109  125  74  150*       Blood Sugar Average: Last 72 hrs:  (P) 114 5     Continue insulin  Monitor Accu-Cheks closely, avoid hypoglycemia

## 2018-07-27 NOTE — PROGRESS NOTES
Progress Note - Magui Hoang 1960, 60 y o  male MRN: [id number]    Unit/Bed#: [facility] 724-01 Encounter: [id number]    Primary Care Provider: Chery Conte DO   Date and time admitted to hospital: 7/26/2018  5:03 PM        * Non-healing wound of left heel   Assessment & Plan    Nonhealing left heel ulcer with osteomyelitis  Podiatry and vascular surgery following  Infectious Disease input noted presently on cefazolin and metronidazole  Possible left BKA per Podiatry/vascular surgery            PAD (peripheral artery disease) ([address] )   Assessment & Plan    Continue Plavix, atorvastatin  Vascular surgery following        Diabetic ulcer of left heel associated with type 2 diabetes mellitus, limited to breakdown of skin [facility])   Assessment & Plan    Lab Results   Component Value Date    HGBA1C 7 9 (H) 07/27/2018    Hemoglobin A1c for tomorrow  Check blood sugars before meals and bedtime  Insulin sliding scale  Recent Labs      07/26/18   1738  07/26/18   2113  07/27/18   0646  07/27/18   1039   POCGLU  109  125  74  150*       Blood Sugar Average: Last 72 hrs:  (P) 114 5     Continue insulin  Monitor Accu-Cheks closely, avoid hypoglycemia         Triple vessel coronary artery disease   Assessment & Plan    Continue Plavix statin            VTE Pharmacologic Prophylaxis:   Pharmacologic: Enoxaparin (Lovenox)  Mechanical VTE Prophylaxis in Place: Yes    Patient Centered Rounds: I have performed bedside rounds with nursing staff today  Discussions with Specialists or Other Care Team Provider:     Education and Discussions with Family / Patient: pt    Time Spent for Care: 30 minutes  More than 50% of total time spent on counseling and coordination of care as described above      Current Length of Stay: 1 day(s)    Current Patient Status: Inpatient   Certification Statement: The patient will continue to require additional inpatient hospital stay due to As mentioned    Discharge Plan:  The nonhealing ulcer left foot requiring surgical intervention as outlined    Code Status: Level 1 - Full Code      Subjective:     Comfortably lying in bed  Reports feeling okay  History chart labs medications reviewed    Objective:     Vitals:   Temp (24hrs), Av 6 °F (37 °C), Min:98 °F (36 7 °C), Max:99 1 °F (37 3 °C)    HR:  [72-94] 72  Resp:  [16-18] 16  BP: (134-202)/(60-91) 168/80  SpO2:  [92 %-98 %] 95 %  Body mass index is 33 45 kg/m²  Input and Output Summary (last 24 hours): Intake/Output Summary (Last 24 hours) at 18 1456  Last data filed at 18 1227   Gross per 24 hour   Intake              640 ml   Output                0 ml   Net              640 ml       Physical Exam:     Physical Exam    Obese  Short thick neck  Lungs diminished breath sounds bases  Heart sounds S1-S2 noted  Abdomen soft  Awake obeys simple commands  Left foot ulcer noted  Pulses noted  No rash    Additional Data:     Labs:      Results from last 7 days  Lab Units 18  0606   WBC Thousand/uL 9 41   HEMOGLOBIN g/dL 10 3*   HEMATOCRIT % 33 3*   PLATELETS Thousands/uL 283   NEUTROS PCT % 71   LYMPHS PCT % 17   MONOS PCT % 8   EOS PCT % 3       Results from last 7 days  Lab Units 18  0606 18  1812   SODIUM mmol/L 138 136   POTASSIUM mmol/L 3 4* 3 6   CHLORIDE mmol/L 105 102   CO2 mmol/L 26 29   BUN mg/dL 11 12   CREATININE mg/dL 0 73 0 93   CALCIUM mg/dL 8 3 8 7   TOTAL PROTEIN g/dL  --  8 0   BILIRUBIN TOTAL mg/dL  --  0 27   ALK PHOS U/L  --  69   ALT U/L  --  14   AST U/L  --  15   GLUCOSE RANDOM mg/dL 75 105       Results from last 7 days  Lab Units 18  1812   INR  1 05       Results from last 7 days  Lab Units 18  1039 18  0646 18  2113 18  1738   POC GLUCOSE mg/dl 150* 74 125 109       Results from last 7 days  Lab Units 18  0606   HEMOGLOBIN A1C % 7 9*         * I Have Reviewed All Lab Data Listed Above  * Additional Pertinent Lab Tests Reviewed:  Wale Ocampo Admission Reviewed    Imaging:    Imaging Reports Reviewed Today Include:  Imaging studies reviewed  Imaging Personally Reviewed by Myself Includes:     Recent Cultures (last 7 days):           Last 24 Hours Medication List:     Current Facility-Administered Medications:  acetaminophen 650 mg Oral Q6H PRN Allison Talbot MD    aluminum-magnesium hydroxide-simethicone 15 mL Oral Q6H PRN Allison Talbot MD    atorvastatin 80 mg Oral Daily With Ko Kim MD    bromfenac sodium 1 drop Right Eye Daily Allison Talbot MD    Carboxymeth-Glyc-Polysorb PF 1 drop Right Eye TID Gumaro Valdze MD    cefepime 2,000 mg Intravenous Q12H Allison Talbot MD Last Rate: 2,000 mg (07/27/18 1526)   clopidogrel 75 mg Oral Daily Allison Talbot MD    docusate sodium 100 mg Oral BID Allison Talbot MD    enoxaparin 40 mg Subcutaneous Daily Allison Talbot MD    ferrous sulfate 325 mg Oral BID With Meals Allison Talbot MD    fludrocortisone 0 1 mg Oral Daily Allison Talbot MD    furosemide 20 mg Oral Daily Allison Talbot MD    insulin glargine 70 Units Subcutaneous Q12H [address] Allison Talbot MD    insulin lispro 1-5 Units Subcutaneous HS Allison Talbot MD    insulin lispro 2-12 Units Subcutaneous TID AC Allison Talbot MD    metoclopramide 5 mg Oral Daily Allison Talbot MD    metroNIDAZOLE 500 mg Intravenous Q8H Allison Talbot MD Last Rate: 500 mg (07/27/18 0811)   midodrine 5 mg Oral TID With Meals Allison Talbot MD    sodium chloride (PF) 3 mL Intravenous PRN Jeanie El, DO    tamsulosin 0 4 mg Oral Daily With Ko Kim MD    vancomycin 15 mg/kg Intravenous Q12H Allison Talbot MD Last Rate: 1,750 mg (07/27/18 1374)        Today, Patient Was Seen By: Yuridia Moreno MD    ** Please Note: Dictation voice to text software may have been used in the creation of this document   **

## 2018-07-27 NOTE — ASSESSMENT & PLAN NOTE
Nonhealing left heel ulcer with osteomyelitis  Podiatry and vascular surgery following  Infectious Disease input noted presently on cefazolin and metronidazole  Possible left BKA per Podiatry/vascular surgery

## 2018-07-27 NOTE — CASE MANAGEMENT
Initial Clinical Review    Admission: Date/Time/Statement: 7/26/18 @ 1927     Orders Placed This Encounter   Procedures    Inpatient Admission (expected length of stay for this patient is greater than two midnights)     Standing Status:   Standing     Number of Occurrences:   1     Order Specific Question:   Admitting Physician     Answer:   Lily Pizano [[id number]]     Order Specific Question:   Level of Care     Answer:   Med Surg [[id number]]     Order Specific Question:   Estimated length of stay     Answer:   More than 2 Midnights     Order Specific Question:   Certification     Answer:   I certify that inpatient services are medically necessary for this patient for a duration of greater than two midnights  See H&P and MD Progress Notes for additional information about the patient's course of treatment  ED: Date/Time/Mode of Arrival:   ED Arrival Information     Expected Arrival Acuity Means of Arrival Escorted By Service Admission Type    - 7/26/2018 15:12 Urgent Wheelchair Self General Medicine Urgent    Arrival Complaint    leg injury          Chief Complaint:   Chief Complaint   Patient presents with    Wound Infection     pt with non healing wound on his left leg/foot area- has a bone infection in his left heel, pt stated that his MD told him that he needs IV abx an have his foot/ leg amputated        History of Illness:   Romy Multani is a 60 y o  male who has a history of diabetes and peripheral vascular disease  Patient has been seen by both vascular and Podiatry  According to him, the patient had been seen by vascular in the past and occlusion has been recanalized on June 7, 2018  According to patient this has helped with the circulation and with healing  However recently it seems that the wound is getting worse  Patient was seen by Podiatry and was prescribed Keflex  However due to the worsening status, the patient was advised to go to emergency room    He was then admitted for antibiotic treatment  ED Vital Signs:   ED Triage Vitals   Temperature Pulse Respirations Blood Pressure SpO2   07/26/18 1523 07/26/18 1523 07/26/18 1523 07/26/18 1523 07/26/18 1523   99 1 °F (37 3 °C) 86 16 134/60 98 %      No Pain       07/26/18 115 kg (253 lb 8 5 oz)       Vital Signs (abnormal):       07/26/18 1915  --  94  18   202/91  96 %   07/26/18 1742  --  92  17   191/90  97 %         Abnormal Labs/Diagnostic Test Results    Lab Units 07/26/18  1812   WBC Thousand/uL 10 71*   HEMOGLOBIN g/dL 11 5*         ED Treatment:   Medication Administration from 07/26/2018 1512 to 07/26/2018 2027       Date/Time Order Dose Route     07/26/2018 1943 vancomycin (VANCOCIN) 1,750 mg in sodium chloride 0 9 % 500 mL IVPB 1,750 mg Intravenous     07/26/2018 1815 cefepime (MAXIPIME) 2 g/50 mL dextrose IVPB 2,000 mg Intravenous     07/26/2018 1817 sodium chloride 0 9 % bolus 1,000 mL 1,000 mL Intravenous     07/26/2018 1943 metroNIDAZOLE (FLAGYL) IVPB (premix) 500 mg 0 mg Intravenous     07/26/2018 1910 metroNIDAZOLE (FLAGYL) IVPB (premix) 500 mg 500 mg Intravenous           Past Medical History:    Anemia    Anxiety and depression    Autonomic neuropathy    Cataract    Coronary artery disease    Diabetes mellitus ([place] [address] )    Diabetic autonomic neuropathy associated with type 2 diabetes mellitus ([place] [address] )    Gastroparesis due to DM ([facility] )    History of DVT (deep vein thrombosis)    HTN (hypertension)    Hyperlipidemia    Non healing left heel wound    Obesity    Orthostatic hypotension       Admitting Diagnosis: [facility] ) [M86 9]  Wound infection [T14  8XXA, L08 9]    Age/Sex: 60 y o  male    Assessment/Plan:      INTERNAL MEDICINE    Non-healing wound of left heel   Assessment & Plan     Patient was recently seen by Podiatry and was also advised that needs to be seen by vascular surgery  Vascular surgery follow-up    Will continue with antibiotics cefepime vancomycin and metronidazole for now           Diabetic ulcer of left heel associated with type 2 diabetes mellitus, limited to breakdown of skin [facility])   Assessment & Plan             Lab Results   Component Value Date     HGBA1C 9 4 (H) 03/14/2018    Hemoglobin A1c for tomorrow  Check blood sugars before meals and bedtime  Insulin sliding scale           Recent Labs      07/26/18   1738  07/26/18   2113   POCGLU  109  125         Blood Sugar Average: Last 72 hrs:  (P) 117          Bilateral lower extremity edema   Assessment & Plan     Bilateral lower extremity edema seems to be stable at this point           PAD (peripheral artery disease) ([facility])   Assessment & Plan     Vascular surgery follow-up           Triple vessel coronary artery disease   Assessment & Plan     Patient is currently asymptomatic                 VASCULAR   43-year-old male with PMH of DM type 2, PAD, CAD (s/p CABG 3/28/18), and CHF, that presents with left heel osteomyelitis     s/p LLE agram with recanalization of popliteal artery (DOS: 6/8/18)     Plan:  - left foot is deemed nonsalvageable per Podiatry  Podiatry recommending left BKA  - patient is in agreement to left BKA  Will consult Cardiology for clearance  Surgery will be scheduled sometime next week pending Cards clearance  - The benefits, risks, and alternatives of the surgery were discussed with the patient  - All questions/concerns were answered  No guarantees given to outcome of procedure        Admission Orders:    CONSULT CARDIOLOGY  CONSULT INFECTIOUS DISEASE   CONSULT PODIATRY  CONSULT VASCULAR     Scheduled Meds:     acetaminophen 650 mg Oral Q6H PRN   aluminum-magnesium hydroxide-simethicone 15 mL Oral Q6H PRN   atorvastatin 80 mg Oral Daily With Dinner   bromfenac sodium 1 drop Right Eye Daily   Carboxymeth-Glyc-Polysorb PF 1 drop Right Eye TID   cefepime 2,000 mg Intravenous Q12H   clopidogrel 75 mg Oral Daily   docusate sodium 100 mg Oral BID   enoxaparin 40 mg Subcutaneous Daily   ferrous sulfate 325 mg Oral BID With Meals   fludrocortisone 0 1 mg Oral Daily   furosemide 20 mg Oral Daily   insulin glargine 70 Units Subcutaneous Q12H [place]   insulin lispro 1-5 Units Subcutaneous HS   insulin lispro 2-12 Units Subcutaneous TID AC   metoclopramide 5 mg Oral Daily   metroNIDAZOLE 500 mg Intravenous Q8H   midodrine 5 mg Oral TID With Meals   sodium chloride (PF) 3 mL Intravenous PRN   tamsulosin 0 4 mg Oral Daily With Dinner   vancomycin 15 mg/kg Intravenous Q12H

## 2018-07-27 NOTE — CONSULTS
Please see consult note from Dr Inga Small on 7/26  Podiatry will continue to follow while in-patient

## 2018-07-27 NOTE — SOCIAL WORK
Received an email from [employer] [facility] JENNIFER, who stated that she is pt's Outpt Care Manager  She stated that she is able to assist in discharge planning as needed

## 2018-07-27 NOTE — ASSESSMENT & PLAN NOTE
Patient was recently seen by Podiatry and was also advised that needs to be seen by vascular surgery  Vascular surgery follow-up  Will continue with antibiotics cefepime vancomycin and metronidazole for now

## 2018-07-27 NOTE — ASSESSMENT & PLAN NOTE
Lab Results   Component Value Date    HGBA1C 9 4 (H) 03/14/2018    Hemoglobin A1c for tomorrow  Check blood sugars before meals and bedtime  Insulin sliding scale      Recent Labs      07/26/18   1738  07/26/18   2113   POCGLU  109  125       Blood Sugar Average: Last 72 hrs:  (P) 117

## 2018-07-27 NOTE — PLAN OF CARE
Problem: DISCHARGE PLANNING - CARE MANAGEMENT  Goal: Discharge to post-acute care or home with appropriate resources  INTERVENTIONS:  - Conduct assessment to determine patient/family and health care team treatment goals, and need for post-acute services based on payer coverage, community resources, and patient preferences, and barriers to discharge  - Address psychosocial, clinical, and financial barriers to discharge as identified in assessment in conjunction with the patient/family and health care team  - Arrange appropriate level of post-acute services according to patient's   needs and preference and payer coverage in collaboration with the physician and health care team  - Communicate with and update the patient/family, physician, and health care team regarding progress on the discharge plan  - Arrange appropriate transportation to post-acute venues  - Pt will be discharge to inpt rehab  Outcome: Progressing

## 2018-07-27 NOTE — CONSULTS
Consultation - Cardiology Team One  Ashlyn Wise 60 y o  male MRN: [id number]  Unit/Bed#: [facility] 724-01 Encounter: [id number]    Inpatient consult to Cardiology  [facility]  ordered by: Mercedes Sharp          Physician Requesting Consult: Danielle Jha MD     Reason for Consult / Principal Problem: pre-operative clearance    History of Present Illness      HPI: Ashlyn Wise is a 60y o  year old male who has a history of CAD with recent CABG 3/2018, PAD, DM, CHF, HTN, HLD  He follows with cardiologist Dr Richardson Regan  Pt presented to ED at that direction of his podiatrist due to LLE non-healing wound  The limb has been deemed non-salvagable and he is to undergo a left BKA this admission  Cardiac clearance has been requested  Pt has past medical hx of CAD; was admitted to [facility] for non-healing wound but also was noted to have elevated troponin; he ultimately underwent cardia cath that showed multivessel disease; of note he did not have any anginal symptoms  3/2018 he underwent CABG x3 with LIMA to LAD, SVG to circ and SVG to RCA  He has preserved LV function with LVEF 55-60% with hypokinesis of basal inferior walls  He recovered well from his bypass; was seen in office last 6/2018  He does not get any exertional chest discomfort or SOB  His mobility is limited by LE wounds but he does ambulate down a few stairs and out to a car without symptoms; otherwise he uses a wheelchair in the house  Denies any recent weight gain, increase in chronic LE edema, SOB or orthopnea  He is compliant with his medications which include plavix, statin, lasix 20mg daily  He is not on beta blocker due to orthostatic hypotension; he is on midodrine and florinef for this  He is a diabetic with multiple complications including retinopathy and gastroparesis  Has PAD; recent popliteal artery intervention with drug eluting balloon  He remains on plavix     LLE non-healing wound for which he has been on and off abx for; followed by podiatry and wound care as OP; most recently he had new foul drainage and was placed on PO abx and told he ultimately needed an amputation; he wanted to have his cataract surgery first which was on 7/25  At time of my exam he has no complaints  Review of Systems   Constitution: Negative for decreased appetite, fever, weakness, malaise/fatigue and weight gain  Cardiovascular: Negative for chest pain, dyspnea on exertion, irregular heartbeat, leg swelling (chronic, unchanged), orthopnea and palpitations  Respiratory: Negative for cough and shortness of breath  Gastrointestinal: Negative for abdominal pain, nausea and vomiting  Genitourinary: Negative for dysuria  Neurological: Negative for dizziness and light-headedness  Psychiatric/Behavioral: Negative for altered mental status  All other systems reviewed and are negative       Historical Information   Past Medical History:   Diagnosis Date    Anemia     Anxiety and depression     Autonomic neuropathy     Cataract     Coronary artery disease     Diabetes mellitus ([facility] )     Diabetic autonomic neuropathy associated with type 2 diabetes mellitus ([facility] )     Last Assessed: 12/28/2017    [facility] DM [facility]     History of DVT (deep vein thrombosis)     left LE    HTN (hypertension)     Hyperlipidemia     Non healing left heel wound     Obesity     Orthostatic hypotension      Past Surgical History:   Procedure Laterality Date    DE CABG, ARTERY-VEIN, FOUR N/A 3/28/2018    Procedure: CORONARY ARTERY BYPASS GRAFT (CABG) x3 VESSELS, LIMA TO LAD, SVG--> PDA, SVG--> OM2,  RIGHT LEG EVH/SVH TO , INTRA-OP AMANDEEP;  Surgeon: Prashanth Booker MD;  [facility]: [facility];  Service: Cardiac Surgery    ROTATOR CUFF REPAIR Bilateral      History   Alcohol Use No     History   Drug Use No     History   Smoking Status    Former Smoker    Packs/day: 1 00    Years: 12 00    Types: Cigarettes    Quit date: 3/23/1994   Smokeless Tobacco    Never Used     Family History:   Family History   Problem Relation Age of Onset    Atrial fibrillation Mother     Stroke Mother     Hypertension Father     Heart attack Paternal Grandfather 61       Meds/Allergies   current meds:   Current Facility-Administered Medications   Medication Dose Route Frequency    acetaminophen (TYLENOL) tablet 650 mg  650 mg Oral Q6H PRN    aluminum-magnesium hydroxide-simethicone (MYLANTA) 200-200-20 mg/5 mL oral suspension 15 mL  15 mL Oral Q6H PRN    atorvastatin (LIPITOR) tablet 80 mg  80 mg Oral Daily With Dinner    bromfenac sodium 0 07 % SOLN 1 drop  1 drop Right Eye Daily    Carboxymeth-Glyc-Polysorb PF 0 5-1-0 5 % SOLN 1 drop  1 drop Right Eye TID    cefepime (MAXIPIME) 2 g/50 mL dextrose IVPB  2,000 mg Intravenous Q12H    clopidogrel (PLAVIX) tablet 75 mg  75 mg Oral Daily    docusate sodium (COLACE) capsule 100 mg  100 mg Oral BID    enoxaparin (LOVENOX) subcutaneous injection 40 mg  40 mg Subcutaneous Daily    ferrous sulfate tablet 325 mg  325 mg Oral BID With Meals    fludrocortisone (FLORINEF) tablet 0 1 mg  0 1 mg Oral Daily    furosemide (LASIX) tablet 20 mg  20 mg Oral Daily    insulin glargine (LANTUS) subcutaneous injection 70 Units 0 7 mL  70 Units Subcutaneous Q12H [address]    insulin lispro (HumaLOG) 100 units/mL subcutaneous injection 1-5 Units  1-5 Units Subcutaneous HS    insulin lispro (HumaLOG) 100 units/mL subcutaneous injection 2-12 Units  2-12 Units Subcutaneous TID AC    metoclopramide (REGLAN) tablet 5 mg  5 mg Oral Daily    metroNIDAZOLE (FLAGYL) IVPB (premix) 500 mg  500 mg Intravenous Q8H    midodrine (PROAMATINE) tablet 5 mg  5 mg Oral TID With Meals    sodium chloride (PF) 0 9 % injection 3 mL  3 mL Intravenous PRN    tamsulosin (FLOMAX) capsule 0 4 mg  0 4 mg Oral Daily With Dinner    vancomycin (VANCOCIN) 1,750 mg in sodium chloride 0 9 % 500 mL IVPB  15 mg/kg Intravenous Q12H     Allergies   Allergen Reactions    Metformin      Objective   Vitals: Blood pressure 168/80, pulse 72, temperature 98 °F (36 7 °C), temperature source Oral, resp  rate 16, height 6' 1" (1 854 m), weight 115 kg (253 lb 8 5 oz), SpO2 95 %  ,     Body mass index is 33 45 kg/m²  ,     Systolic (89JXU), WUZ:918 , Min:134 , TWV:724     Diastolic (34CFX), EXM:66, Min:60, Max:91      Intake/Output Summary (Last 24 hours) at 07/27/18 1208  Last data filed at 07/27/18 0900   Gross per 24 hour   Intake              160 ml   Output                0 ml   Net              160 ml     Weight (last 2 days)     Date/Time   Weight    07/26/18 1523  115 (253 53)            Invasive Devices     Peripheral Intravenous Line            Peripheral IV 07/26/18 Right Antecubital less than 1 day                  Physical Exam   Constitutional: He is oriented to person, place, and time  No distress  Patient is sitting up in bed in NAD alert pleasant cooperative   HENT:   Head: Normocephalic and atraumatic  Neck: No JVD present  Cardiovascular: Normal rate, regular rhythm, S1 normal and S2 normal     No murmur heard  No lower extremity edema   Pulmonary/Chest: Effort normal and breath sounds normal  No respiratory distress  He has no wheezes  He has no rales  Abdominal: Soft  Bowel sounds are normal    Musculoskeletal: He exhibits edema  Neurological: He is alert and oriented to person, place, and time  Skin: Skin is warm  He is not diaphoretic  Bilateral lower extremities with multiple dressings   Psychiatric: He has a normal mood and affect  His behavior is normal    Nursing note and vitals reviewed      LABORATORY RESULTS:      CBC with diff:   Results from last 7 days  Lab Units 07/27/18  0606 07/26/18  1812   WBC Thousand/uL 9 41 10 71*   HEMOGLOBIN g/dL 10 3* 11 5*   HEMATOCRIT % 33 3* 37 1   MCV fL 81* 81*   PLATELETS Thousands/uL 283 323   MCH pg 24 9* 25 2*   MCHC g/dL 30 9* 31 0*   RDW % 15 3* 15 4* MPV fL 9 4 9 4   NRBC AUTO /100 WBCs 0 0     CMP:  Results from last 7 days  Lab Units 07/27/18  0606 07/26/18  1812   SODIUM mmol/L 138 136   POTASSIUM mmol/L 3 4* 3 6   CHLORIDE mmol/L 105 102   CO2 mmol/L 26 29   ANION GAP mmol/L 7 5   BUN mg/dL 11 12   CREATININE mg/dL 0 73 0 93   GLUCOSE RANDOM mg/dL 75 105   CALCIUM mg/dL 8 3 8 7   AST U/L  --  15   ALT U/L  --  14   ALK PHOS U/L  --  69   TOTAL PROTEIN g/dL  --  8 0   BILIRUBIN TOTAL mg/dL  --  0 27   EGFR ml/min/1 73sq m 102 90     BMP:  Results from last 7 days  Lab Units 07/27/18  0606 07/26/18  1812   SODIUM mmol/L 138 136   POTASSIUM mmol/L 3 4* 3 6   CHLORIDE mmol/L 105 102   CO2 mmol/L 26 29   BUN mg/dL 11 12   CREATININE mg/dL 0 73 0 93   GLUCOSE RANDOM mg/dL 75 105   CALCIUM mg/dL 8 3 8 7     Lab Results   Component Value Date    NTBNP 527 (H) 03/14/2018    NTBNP 794 (H) 12/16/2017        Results from last 7 days  Lab Units 07/27/18  0606   MAGNESIUM mg/dL 1 7        Results from last 7 days  Lab Units 07/27/18  0606   HEMOGLOBIN A1C % 7 9*        Results from last 7 days  Lab Units 07/27/18  0606   TSH 3RD GENERATON uIU/mL 2 370       Results from last 7 days  Lab Units 07/26/18  1812   INR  1 05     Lipid Profile:   Lab Results   Component Value Date    CHOL 85 03/16/2018    CHOL 169 01/02/2018    CHOL 214 (H) 09/13/2016     Lab Results   Component Value Date    HDL 33 (L) 03/16/2018    HDL 38 (L) 01/02/2018    HDL 43 09/13/2016     Lab Results   Component Value Date    LDLCALC 39 03/16/2018    LDLCALC 96 01/02/2018    LDLCALC 129 (H) 09/13/2016     Lab Results   Component Value Date    TRIG 64 03/16/2018    TRIG 176 (H) 01/02/2018    TRIG 212 (H) 09/13/2016     Cardiac testing:   Results for orders placed during the hospital encounter of 03/14/18   Echo complete with contrast if indicated    Narrative [address]    [place], [address]  [phone]    Transthoracic Echocardiogram  2D, M-mode, Doppler, and Color Doppler    Study date:  15-Mar-2018    Patient: Daxa Mcduffie  MR number: [id number]  Account number: [de-identified]  : 1960  Age: 61 years  Gender: Male  Status: Inpatient  Location: Bedside  Height: 73 in  Weight: 258 lb  BP: 160/ 85 mmHg    Indications: Acute myocardial infarction  Diagnoses: I21 4 - Non-ST elevation (NSTEMI) myocardial infarction    Sonographer:  Stephanie Montero RDCS  Primary Physician:  Radha Lowery DO  Referring Physician:  Chinyere Rock PA-C  Group:  [facility]  Interpreting Physician:  Eamon Jacobo MD    SUMMARY    LEFT VENTRICLE:  Size was at the upper limits of normal   Systolic function was normal  Ejection fraction was estimated in the range of 55 % to 60 %  There was moderate hypokinesis of the basal inferior wall(s)  Wall thickness was at the upper limits of normal     MITRAL VALVE:  There was trace regurgitation  TRICUSPID VALVE:  There was trace regurgitation  PULMONIC VALVE:  There was trace regurgitation  HISTORY: PRIOR HISTORY: Hypertension, Diabetes, Peripheral vascular disease, Elevated troponin  PROCEDURE: The procedure was performed at the bedside  This was a routine study  The transthoracic approach was used  The study included complete 2D imaging, M-mode, complete spectral Doppler, and color Doppler  The heart rate was 73 bpm,  at the start of the study  Images were obtained from the parasternal, apical, subcostal, and suprasternal notch acoustic windows  Echocardiographic views were limited due to decreased penetration and lung interference  This was a  technically difficult study  LEFT VENTRICLE: Size was at the upper limits of normal  Systolic function was normal  Ejection fraction was estimated in the range of 55 % to 60 %  There was moderate hypokinesis of the basal inferior wall(s)   Wall thickness was at the  upper limits of normal  DOPPLER: There was an increased relative contribution of atrial contraction to ventricular filling  The deceleration time of the early transmitral flow velocity was normal     RIGHT VENTRICLE: The size was normal  Systolic function was normal  Wall thickness was normal     LEFT ATRIUM: Size was at the upper limits of normal     RIGHT ATRIUM: Size was normal     MITRAL VALVE: Valve structure was normal  There was normal leaflet separation  DOPPLER: The transmitral velocity was within the normal range  There was no evidence for stenosis  There was trace regurgitation  AORTIC VALVE: The valve was trileaflet  Leaflets exhibited normal thickness and normal cuspal separation  DOPPLER: Transaortic velocity was within the normal range  There was no evidence for stenosis  There was no regurgitation  TRICUSPID VALVE: The valve structure was normal  There was normal leaflet separation  DOPPLER: The transtricuspid velocity was within the normal range  There was no evidence for stenosis  There was trace regurgitation  Pulmonary artery  systolic pressure was within the normal range  Estimated peak PA pressure was 20 mmHg  PULMONIC VALVE: Leaflets exhibited normal thickness, no calcification, and normal cuspal separation  DOPPLER: The transpulmonic velocity was within the normal range  There was trace regurgitation  PERICARDIUM: The amount of pericardial fluid appeared to be at the upper limits of normal     AORTA: The root exhibited normal size  SYSTEMIC VEINS: IVC: The inferior vena cava was not well visualized      SYSTEM MEASUREMENT TABLES    2D  Ao Diam: 3 6 cm  IVSd: 1 cm  LA Diam: 4 cm  LVIDd: 5 3 cm  LVIDs: 3 9 cm  LVPWd: 0 9 cm    CW  TR Vmax: 1 9 m/s  TR maxP 7 mmHg    PW  E': 0 m/s  E/E': 15 2  MV A Rocky: 0 7 m/s  MV Dec Pitt: 5 4 m/s2  MV DecT: 127 7 ms  MV E Rocky: 0 7 m/s  MV E/A Ratio: 1    [employer] Accredited Echocardiography Laboratory    Prepared and electronically signed by    Viktoriya Giraldo MD  Signed 15-Mar-2018 13:11:20         Results for orders placed during the hospital encounter of 18   NM myocardial perfusion spect (stress and/or rest)    Narrative [address] [facility] 35  [place], [address]  [phone]    Rest/Stress Gated SPECT Myocardial Perfusion Imaging After Regadenoson    Patient: Roger Ovalles  MR number: [id number]  Account number: [de-identified]  : 1960  Age: 61 years  Gender: Male  Status: Inpatient  Location: Stress lab  Height: 73 in  Weight: 258 lb  BP: 153/ 76 mmHg    Allergies: METFORMIN    Diagnosis: R94 39 - Abnormal result of other cardiovascular function study, Z01 810 - Encounter for preprocedural cardiovascular examination    Primary Physician:  Checo Grider DO  Technician:  Audrey Yap  RN:  Korin Loomis RN BSN  Referring Physician:  Teresa Flores MD  Group:  [facility]  Report Prepared By[de-identified]  Korin Loomis RN BSN  Interpreting Physician:  Teresa Flores MD    INDICATIONS: Detection of Coronary artery disease  Pre-operative risk assessment  HISTORY: The patient is a 60year old  male  Chest pain status: no chest pain  Other symptoms: decreased effort tolerance  Coronary artery disease risk factors: dyslipidemia and diabetes mellitus  Cardiovascular history:  peripheral vascular occlusive disease(PAD)  Co-morbidity: obesity  Medications: a lipid lowering agent  Previous test results: abnormal ECG and abnormal resting echocardiogram     PHYSICAL EXAM: Baseline physical exam screening: normal lung exam     REST ECG: Normal sinus rhythm  There was 1-2 mm ST depression in leads II, III and aVF  PROCEDURE: The study was performed in the stress lab  A regadenoson infusion pharmacologic stress test was performed  Gated SPECT myocardial perfusion imaging was performed after stress and at rest  Systolic blood pressure was 153 mmHg, at  the start of the study  Diastolic blood pressure was 76 mmHg, at the start of the study   The heart rate was 78 bpm, at the start of the study  IV double checked  Regadenoson protocol:  HR bpm SBP mmHg DBP mmHg Symptoms  Baseline 78 153 76 none  Immediate 86 105 64 moderate dyspnea  3 min 81 -- -- subsiding  5 min 82 115 64 none  No medications or fluids given  STRESS SUMMARY: Duration of pharmacologic stress was 3 min  Maximal heart rate during stress was 86 bpm  The rate-pressure product for the peak heart rate and blood pressure was 95081  There was no chest pain during stress  The stress test  was terminated due to protocol completion  Pre oxygen saturation: 97 %  Peak oxygen saturation: 98 %  Arrhythmia during stress: isolated atrial premature beats  The stress ECG was non-diagnostic due to resting ST/T wave abnormality  ISOTOPE ADMINISTRATION:  Resting isotope administration Stress isotope administration  Agent Tetrofosmin Tetrofosmin  Dose 15 2 mCi 48 8 mCi  Date 03/22/2018 03/22/2018  Injection time 10:34 11:40  Imaging time 11:08 12:26  Injection-image interval 34 min 46 min    The radiopharmaceutical was injected at the peak effect of pharmacologic stress  MYOCARDIAL PERFUSION IMAGING:  The image quality was good  The left ventricle was mildly dilated  The TID ratio was 1 28  PERFUSION DEFECTS:  -  There was a large, moderately severe, partially reversible myocardial perfusion defect of the entire inferolateral wall  GATED SPECT:  The calculated left ventricular ejection fraction was 45 %  Left ventricular ejection fraction was mildly decreased by visual estimate  There was moderately reduced myocardial thickening and motion of the inferior wall and lateral wall of  the left ventricle  SUMMARY:  -  Stress results: There was no chest pain during stress  -  ECG conclusions: The stress ECG was non-diagnostic due to resting ST/T wave abnormality   -  Perfusion imaging: There was a large, moderately severe, partially reversible myocardial perfusion defect of the entire inferolateral wall    - Gated SPECT: The calculated left ventricular ejection fraction was 45 %  Left ventricular ejection fraction was mildly decreased by visual estimate  There was moderately reduced myocardial thickening and motion of the inferior wall and  lateral wall of the left ventricle  IMPRESSIONS: Abnormal study after pharmacologic stress  Prepared and signed by    Hazel Youngblood MD  Signed 03/22/2018 49:24:95       Imaging: I have personally reviewed pertinent reports  Xr Heel / Calcaneus 2+ Vw Left    Result Date: 7/5/2018  Narrative: LEFT HEEL INDICATION:   L97 529: Non-pressure chronic ulcer of other part of left foot with unspecified severity  Left heel ulcer, nonhealing for the last 6 months  Patient is diabetic  COMPARISON:  Left foot plain films from 5/23/2018  VIEWS:  XR HEEL / CALCANEUS 2+ VW LEFT FINDINGS: Again seen is a plantar heel ulcer  There are developing cortical lucencies in the adjacent plantar calcaneal tubercle highly suspicious for osteomyelitis  There is a large retrocalcaneal spur  No lytic or blastic lesions seen  There are atherosclerotic calcifications  Soft tissues are otherwise unremarkable  Impression: Again seen is a plantar heel ulcer  There are developing cortical lucencies in the adjacent plantar calcaneal tubercle highly suspicious for osteomyelitis  Workstation performed: [id number]     Mri Ankle/heel Left  Wo Contrast    Result Date: 7/17/2018  Narrative: MRI LEFT ANKLE - WITHOUT CONTRAST INDICATION:   B99 9: Unspecified infectious disease  COMPARISON: Plain film dated 7/3/2018  TECHNIQUE:   MR sequences were obtained of the left ankle including: Localizers, coronal STIR, coronal T1, axial T2 fat sat, axial PD, sagittal T2 fat sat, sagittal T1 sequences were obtained  Images were acquired on a1 5 Joanne Unit  Gadolinium was not used  FINDINGS: SUBCUTANEOUS TISSUES: Prominent dorsal plantar skin ulceration with mild reactive edema consistent with cellulitis   JOINT EFFUSION: None  TENDONS: Achilles tendon: Normal  Peroneus brevis and longus: Normal  Posterior tibialis, flexor digitorum longus, flexor hallucis longus: Normal  Anterior tibialis, extensor digitorum longus, extensor hallucis longus:  Normal  LIGAMENTS: Lateral collateral ligament complex:  Normal  Distal tibiofibular syndesmosis:  Normal  Medial collateral ligament complex:  Normal  PLANTAR FASCIA:  Normal  ARTICULAR SURFACES:  Normal  SINUS TARSI:  Normal  Tarsal Tunnel: Unremarkable  BONES:  Erosive change at the dorsal plantar aspect of the calcaneus with reactive marrow edema as well as confluent T1 marrow replacement signal best seen on 601/11 consistent with osteomyelitis  No abscess formation  Mild hindfoot degenerative changes noted  MUSCULATURE:  Grade 1 strain pattern involving the common peroneal and medial compartment musculature  Impression: Dorsal and plantar calcaneal cortical erosion with replacement marrow signal consistent with osteomyelitis  This occurs in the region of prominent skin ulceration and reactive cellulitis  No gross evidence of abscess formation  I personally discussed this study with Feliberto Hdez on 7/17/2018 at 11:08 AM   Workstation performed: [id number]     Vas Lower Limb Arterial Duplex, Limited/unilateral    Result Date: 7/5/2018  Narrative:  [facility] REPORT CLINICAL: Indications:  Type 2 diabetes mellitus with foot ulcer [E11 621]  Patient s/p recanalization of chronically occluded left popliteal artery using standard and drug coated balloon angioplasty Operative History: No prior heart or vascular surgery Risk Factors The patient has history of Obesity, HTN, Diabetes (Yes), hyperlipidemia, PAD and CAD  Clinical Right Pressure:  162/ mm Hg, Left Pressure:  143/ mm Hg    FINDINGS:  Segment                Right     Left                                          Waveform  Impression  Waveform    PSV  EDV  Post  Tibial           Biphasic              Monophasic Ant  Tibial            Biphasic              Monophasic            Common Femoral Artery                                    164   16  Prox Profunda                                            193   19  Prox SFA                                                 167   24  Mid SFA                                                  164   25  Dist SFA                                                 233   36  Proximal Pop                                             235   34  Distal Pop                       50-75%                  382   83  Dist Post Tibial                                         171   31  Dist  Ant  Tibial                                        135   32  Dist Peroneal                                             74   20     CONCLUSION:  Impression: Right Lower Limb YUDITH: 1 2, Normal range  Previous study 1 3  Right Metatarsal Pressure: 196 mm Hg  and previous 162  mm Hg  Right Great Toe Pressure 126 mm Hg  Above healing threshold for diabetic  Previous study 97 mm Hg  Left Lower Limb Evaluation shows a 50-75% stenosis is in the distal popliteal artery with diffuse atherosclerotic disease noted throughout the remaining portions of the femoropopliteal arteries  Findings suggest tibio-peroneal disease  YUDITH: 1 0  Normal range  However, may be unreliable due to poorly compressible vessels  Previous 0 94  Left Metatarsal Pressure: 240 mm and unreliable, Previous 67  mm Hg Left Great Toe Pressure 82 mm Hg  Above healing threshold for diabetic  Previous study 62 mm Hg  In comparison to the study of 05-24-18, there is no significant change on the right  There is improvement in the disease process on the left s/p intervention    SIGNATURE: Electronically Signed by: Scotty Qiu MD on 2018-07-05 03:52:29 PM      EKG reviewed personally:   5/23/2018  Sinus rhythm with non-specific twave abnormalities    7/27/2018  Sinus rhythm, non-specific twave abnormalities  No significant changes compared to prior    Telemetry reviewed personally:   No telemetry     Assessment  Principal Problem:    Non-healing wound of left heel  Active Problems:    PAD (peripheral artery disease) (HCC)    Bilateral lower extremity edema    Triple vessel coronary artery disease    Diabetic ulcer of left heel associated with type 2 diabetes mellitus, limited to breakdown of skin (HCC)    Assessment/ Plan:    Preoperative clearance:  Patient is in need of a left BKA for a chronic nonhealing wound, he has had a moderate risk from cardiovascular standpoint given his history of CAD with recent CABG  Will follow up on EKG  No clinical signs of decompensated heart failure other monitor fluid status closely during the interim perioperative periods  Would recommend telemetry monitoring 24 hours postprocedure  CAD; with recent CABG x3 with LIMA to LAD, SVG to circumflex, and SVG to RCA  No anginal symptoms  Stable functional capacity  On high intensity statin; no BB due to his orthostatic hypotension requiring midodrine and Florinef  Preserved LV function EF 60%    Chronic diastolic heart failure:  He appears well compensated on exam   Continue home dose oral Lasix 20 mg daily, hold if NPO and resume postoperatively  Hypotension:  Patient was hypertensive presentation is the emergency department although he admits he is very agitated, blood pressures have been better  Monitor as he is on Florinef and midodrine    PAD: with recent intervention; remains on plavix; is going to need left BKA this admission        Thank you for allowing us to participate in this patient's care  This pt will follow up with Dr Viviane James once discharged  Counseling / Coordination of Care  Total floor / unit time spent today 45 minutes  Greater than 50% of total time was spent with the patient and / or family counseling and / or coordination of care    A description of the counseling / coordination of care: Review of history, current assessment, development of a plan  Code Status: Level 1 - Full Code    ** Please Note: Dragon 360 Dictation voice to text software may have been used in the creation of this document   **

## 2018-07-28 PROBLEM — G47.33 OBSTRUCTIVE SLEEP APNEA: Status: ACTIVE | Noted: 2018-07-28

## 2018-07-28 LAB
GLUCOSE SERPL-MCNC: 119 MG/DL (ref 65–140)
GLUCOSE SERPL-MCNC: 122 MG/DL (ref 65–140)
GLUCOSE SERPL-MCNC: 171 MG/DL (ref 65–140)
GLUCOSE SERPL-MCNC: 87 MG/DL (ref 65–140)

## 2018-07-28 PROCEDURE — 99233 SBSQ HOSP IP/OBS HIGH 50: CPT | Performed by: SURGERY

## 2018-07-28 PROCEDURE — 99232 SBSQ HOSP IP/OBS MODERATE 35: CPT | Performed by: INTERNAL MEDICINE

## 2018-07-28 PROCEDURE — 82948 REAGENT STRIP/BLOOD GLUCOSE: CPT

## 2018-07-28 RX ADMIN — FUROSEMIDE 20 MG: 20 TABLET ORAL at 09:57

## 2018-07-28 RX ADMIN — DOCUSATE SODIUM 100 MG: 100 CAPSULE, LIQUID FILLED ORAL at 09:57

## 2018-07-28 RX ADMIN — INSULIN GLARGINE 70 UNITS: 100 INJECTION, SOLUTION SUBCUTANEOUS at 10:22

## 2018-07-28 RX ADMIN — METRONIDAZOLE 500 MG: 500 INJECTION, SOLUTION INTRAVENOUS at 23:38

## 2018-07-28 RX ADMIN — METRONIDAZOLE 500 MG: 500 INJECTION, SOLUTION INTRAVENOUS at 11:09

## 2018-07-28 RX ADMIN — CEFAZOLIN SODIUM 2000 MG: 2 SOLUTION INTRAVENOUS at 18:20

## 2018-07-28 RX ADMIN — CLOPIDOGREL BISULFATE 75 MG: 75 TABLET, FILM COATED ORAL at 09:57

## 2018-07-28 RX ADMIN — CEFAZOLIN SODIUM 2000 MG: 2 SOLUTION INTRAVENOUS at 01:43

## 2018-07-28 RX ADMIN — VANCOMYCIN HYDROCHLORIDE 1750 MG: 10 INJECTION, POWDER, LYOPHILIZED, FOR SOLUTION INTRAVENOUS at 10:22

## 2018-07-28 RX ADMIN — TAMSULOSIN HYDROCHLORIDE 0.4 MG: 0.4 CAPSULE ORAL at 17:42

## 2018-07-28 RX ADMIN — MIDODRINE HYDROCHLORIDE 5 MG: 5 TABLET ORAL at 13:04

## 2018-07-28 RX ADMIN — MIDODRINE HYDROCHLORIDE 5 MG: 5 TABLET ORAL at 17:42

## 2018-07-28 RX ADMIN — ENOXAPARIN SODIUM 40 MG: 40 INJECTION SUBCUTANEOUS at 09:57

## 2018-07-28 RX ADMIN — CEFAZOLIN SODIUM 2000 MG: 2 SOLUTION INTRAVENOUS at 09:57

## 2018-07-28 RX ADMIN — MIDODRINE HYDROCHLORIDE 5 MG: 5 TABLET ORAL at 09:56

## 2018-07-28 RX ADMIN — METOCLOPRAMIDE HYDROCHLORIDE 5 MG: 10 TABLET ORAL at 09:57

## 2018-07-28 RX ADMIN — METRONIDAZOLE 500 MG: 500 INJECTION, SOLUTION INTRAVENOUS at 17:40

## 2018-07-28 RX ADMIN — INSULIN GLARGINE 70 UNITS: 100 INJECTION, SOLUTION SUBCUTANEOUS at 21:41

## 2018-07-28 RX ADMIN — FLUDROCORTISONE ACETATE 0.1 MG: 0.1 TABLET ORAL at 09:57

## 2018-07-28 RX ADMIN — Medication 325 MG: at 09:57

## 2018-07-28 RX ADMIN — METRONIDAZOLE 500 MG: 500 INJECTION, SOLUTION INTRAVENOUS at 00:56

## 2018-07-28 RX ADMIN — Medication 325 MG: at 17:42

## 2018-07-28 RX ADMIN — DOCUSATE SODIUM 100 MG: 100 CAPSULE, LIQUID FILLED ORAL at 17:43

## 2018-07-28 RX ADMIN — ATORVASTATIN CALCIUM 80 MG: 80 TABLET, FILM COATED ORAL at 17:42

## 2018-07-28 NOTE — PROGRESS NOTES
Progress Note - Ramin Estrella 1960, 60 y o  male MRN: [id number]    [facility] Encounter: [id number]    Primary Care Provider: Lynette Rock DO   Date and time admitted to hospital: 7/26/2018  5:03 PM        * Non-healing wound of left heel   Assessment & Plan    Possible BKA next week vascular and podiatry surgery following  IV antibiotics per Infectious Disease            PAD (peripheral artery disease) ([facility] [address] )   Assessment & Plan    Continue statin, Plavix  Vascular surgery following        Diabetic ulcer of left heel associated with type 2 diabetes mellitus, limited to breakdown of skin [facility])   Assessment & Plan    Lab Results   Component Value Date    HGBA1C 7 9 (H) 07/27/2018    Hemoglobin A1c for tomorrow  Check blood sugars before meals and bedtime  Insulin sliding scale  Recent Labs      07/27/18   1606  07/27/18   2100  07/28/18   0634  07/28/18   1043   POCGLU  125  181*  87  122       Blood Sugar Average: Last 72 hrs:  (P) 121 625     Continue insulin, monitor Accu-Cheks  Avoid hypoglycemia        Obstructive sleep apnea   Assessment & Plan    CPAP noncompliant        Triple vessel coronary artery disease   Assessment & Plan    On statin and Plavix            VTE Pharmacologic Prophylaxis:   Pharmacologic: Enoxaparin (Lovenox)  Mechanical VTE Prophylaxis in Place: Yes    Patient Centered Rounds: I have performed bedside rounds with nursing staff today  Discussions with Specialists or Other Care Team Provider:     Education and Discussions with Family / Patient: pt, offered to call family he reports they are aware and declined    Time Spent for Care: 30 minutes  More than 50% of total time spent on counseling and coordination of care as described above  Current Length of Stay: 2 day(s)    Current Patient Status: Inpatient   Certification Statement: The patient will continue to require additional inpatient hospital stay due to As mentioned    Discharge Plan:   For BKA per vascular and Podiatry    Code Status: Level 1 - Full Code      Subjective:     Comfortably lying in bed  Obese  CPAP compliance discussed he reports he does not tolerate CPAP mask and would not like to use it      Objective:     Vitals:   Temp (24hrs), Av 3 °F (36 8 °C), Min:98 1 °F (36 7 °C), Max:98 5 °F (36 9 °C)    HR:  [69-78] 76  Resp:  [18-20] 20  BP: (112-184)/(54-90) 153/77  SpO2:  [95 %-97 %] 95 %  Body mass index is 33 45 kg/m²  Input and Output Summary (last 24 hours):        Intake/Output Summary (Last 24 hours) at 18 1519  Last data filed at 18 1301   Gross per 24 hour   Intake              800 ml   Output             3400 ml   Net            -2600 ml       Physical Exam:     Physical Exam    Morbidly obese  Short thick neck  Lungs diminished breath sounds bilaterally  Heart sounds distant S1-S2 noted  Abdomen soft  Awake alert obeys simple commands  Left lower extremity in bandages  Pulses noted    Additional Data:     Labs:      Results from last 7 days  Lab Units 18  0606   WBC Thousand/uL 9 41   HEMOGLOBIN g/dL 10 3*   HEMATOCRIT % 33 3*   PLATELETS Thousands/uL 283   NEUTROS PCT % 71   LYMPHS PCT % 17   MONOS PCT % 8   EOS PCT % 3       Results from last 7 days  Lab Units 18  0606 18  1812   SODIUM mmol/L 138 136   POTASSIUM mmol/L 3 4* 3 6   CHLORIDE mmol/L 105 102   CO2 mmol/L 26 29   BUN mg/dL 11 12   CREATININE mg/dL 0 73 0 93   CALCIUM mg/dL 8 3 8 7   TOTAL PROTEIN g/dL  --  8 0   BILIRUBIN TOTAL mg/dL  --  0 27   ALK PHOS U/L  --  69   ALT U/L  --  14   AST U/L  --  15   GLUCOSE RANDOM mg/dL 75 105       Results from last 7 days  Lab Units 18  1812   INR  1 05       Results from last 7 days  Lab Units 18  1043 18  0634 18  2100 18  1606 18  1039 18  0646 18  2113 18  1738   POC GLUCOSE mg/dl 122 87 181* 125 150* 74 125 109       Results from last 7 days  Lab Units 18  0606   HEMOGLOBIN A1C % 7 9*         * I Have Reviewed All Lab Data Listed Above  * Additional Pertinent Lab Tests Reviewed: All Labs Within Last 24 Hours Reviewed    Imaging:    Imaging Reports Reviewed Today Include:   Imaging Personally Reviewed by Myself Includes:     Recent Cultures (last 7 days):       Results from last 7 days  Lab Units 07/26/18 1812   BLOOD CULTURE  No Growth at 24 hrs  No Growth at 24 hrs         Last 24 Hours Medication List:     Current Facility-Administered Medications:  acetaminophen 650 mg Oral Q6H PRN Dewayne Joseph MD    aluminum-magnesium hydroxide-simethicone 15 mL Oral Q6H PRN Dewayne Joseph MD    atorvastatin 80 mg Oral Daily With Idalia Crenshaw MD    bromfenac sodium 1 drop Right Eye Daily Dewayne Joseph MD    Carboxymeth-Glyc-Polysorb PF 1 drop Right Eye TID Neelam Perez MD    cefazolin 2,000 mg Intravenous Q8H Dylon Chandra MD Last Rate: Stopped (07/28/18 1109)   clopidogrel 75 mg Oral Daily Dewayne Joseph MD    docusate sodium 100 mg Oral BID Dewayne Joseph MD    enoxaparin 40 mg Subcutaneous Daily Dewayne Joseph MD    ferrous sulfate 325 mg Oral BID With Meals Dewayne Joseph MD    fludrocortisone 0 1 mg Oral Daily Dewayne Joseph MD    furosemide 20 mg Oral Daily Dewayne Joseph MD    hydrALAZINE 5 mg Intravenous Q6H PRN Taiwo Urbano PA-C    insulin glargine 70 Units Subcutaneous Q12H [facility] Dewayne Joseph MD    insulin lispro 1-5 Units Subcutaneous HS Dewayne Joseph MD    insulin lispro 2-12 Units Subcutaneous TID AC Dewayne Joseph MD    metoclopramide 5 mg Oral Daily Dewayne Joseph MD    metroNIDAZOLE 500 mg Intravenous Q8H Dewayne Joseph MD Last Rate: 500 mg (07/28/18 1109)   midodrine 5 mg Oral TID With Meals Dewayne Joseph MD    sodium chloride (PF) 3 mL Intravenous PRN Phelps Crooked, DO    tamsulosin 0 4 mg Oral Daily With Idalia Crenshaw MD    vancomycin 15 mg/kg Intravenous Q12H Dewayne Joseph MD Last Rate: 1,750 mg (07/28/18 1022) Today, Patient Was Seen By: Marcy Lorenz MD    ** Please Note: Dictation voice to text software may have been used in the creation of this document   **

## 2018-07-28 NOTE — ASSESSMENT & PLAN NOTE
Possible BKA next week vascular and podiatry surgery following  IV antibiotics per Infectious Disease

## 2018-07-28 NOTE — PROGRESS NOTES
Progress Note - Vascular Surgery   Kennedy Manzanares 60 y o  male MRN: [id number]  Unit/Bed#: [facility] 724-01 Encounter: [id number]    Assessment:  58M with DM, CAD, CHF, PAD, w/ nonhealing L heel wound and OM    Plan:  -for L BKA; pt agreeable  Time TBD  -needs pre-op labs, T&S, consent  -appreciate cards clearance: moderate risk  -Change left foot dressing, keep LLE wrapped in ACE toes to above knee    Subjective/Objective   Chief Complaint: none    Subjective: feels well    Objective:   Blood pressure 140/54, pulse 69, temperature 98 3 °F (36 8 °C), temperature source Oral, resp  rate 18, height 6' 1" (1 854 m), weight 115 kg (253 lb 8 5 oz), SpO2 96 %  ,Body mass index is 33 45 kg/m²  Intake/Output Summary (Last 24 hours) at 07/28/18 0734  Last data filed at 07/28/18 0700   Gross per 24 hour   Intake             1440 ml   Output             2100 ml   Net             -660 ml       Invasive Devices     Peripheral Intravenous Line            Peripheral IV 07/26/18 Right Antecubital 1 day                Physical Exam:   General: No acute distress  HEENT: Normocephalic, atraumatic with MMM  No scleral icterus  Neck: Normal ROM  No tracheal deviation  Cardio: Normal rate, regular rhythm  Pulm: Normal respiratory effort  Abdomen: Soft, non-tender, non-distended  Extremities: GARCIA, No edema   B/l PT/DP palpable  Neuro: Cranial nerves II-XII intact  Psych: Normal affect      Lab, Imaging and other studies:CBC: No results found for: WBC, HGB, HCT, MCV, PLT, ADJUSTEDWBC, MCH, MCHC, RDW, MPV, NRBC, CMP: No results found for: NA, K, CL, CO2, ANIONGAP, BUN, CREATININE, GLUCOSE, CALCIUM, AST, ALT, ALKPHOS, PROT, ALBUMIN, BILITOT, EGFR  VTE Pharmacologic Prophylaxis: Reason for no pharmacologic prophylaxis Plavix  VTE Mechanical Prophylaxis: sequential compression device

## 2018-07-28 NOTE — ASSESSMENT & PLAN NOTE
Lab Results   Component Value Date    HGBA1C 7 9 (H) 07/27/2018    Hemoglobin A1c for tomorrow  Check blood sugars before meals and bedtime  Insulin sliding scale      Recent Labs      07/27/18   1606  07/27/18   2100  07/28/18   0634  07/28/18   1043   POCGLU  125  181*  87  122       Blood Sugar Average: Last 72 hrs:  (P) 121 625     Continue insulin, monitor Accu-Cheks  Avoid hypoglycemia

## 2018-07-29 LAB
GLUCOSE SERPL-MCNC: 121 MG/DL (ref 65–140)
GLUCOSE SERPL-MCNC: 163 MG/DL (ref 65–140)
GLUCOSE SERPL-MCNC: 175 MG/DL (ref 65–140)
GLUCOSE SERPL-MCNC: 66 MG/DL (ref 65–140)
GLUCOSE SERPL-MCNC: 82 MG/DL (ref 65–140)

## 2018-07-29 PROCEDURE — 99232 SBSQ HOSP IP/OBS MODERATE 35: CPT | Performed by: SURGERY

## 2018-07-29 PROCEDURE — 99232 SBSQ HOSP IP/OBS MODERATE 35: CPT | Performed by: INTERNAL MEDICINE

## 2018-07-29 PROCEDURE — 82948 REAGENT STRIP/BLOOD GLUCOSE: CPT

## 2018-07-29 RX ORDER — INSULIN GLARGINE 100 [IU]/ML
50 INJECTION, SOLUTION SUBCUTANEOUS EVERY 12 HOURS SCHEDULED
Status: DISCONTINUED | OUTPATIENT
Start: 2018-07-29 | End: 2018-07-30

## 2018-07-29 RX ADMIN — ATORVASTATIN CALCIUM 80 MG: 80 TABLET, FILM COATED ORAL at 18:17

## 2018-07-29 RX ADMIN — MIDODRINE HYDROCHLORIDE 5 MG: 5 TABLET ORAL at 12:25

## 2018-07-29 RX ADMIN — MIDODRINE HYDROCHLORIDE 5 MG: 5 TABLET ORAL at 18:25

## 2018-07-29 RX ADMIN — INSULIN GLARGINE 50 UNITS: 100 INJECTION, SOLUTION SUBCUTANEOUS at 09:40

## 2018-07-29 RX ADMIN — INSULIN LISPRO 1 UNITS: 100 INJECTION, SOLUTION INTRAVENOUS; SUBCUTANEOUS at 22:06

## 2018-07-29 RX ADMIN — METRONIDAZOLE 500 MG: 500 INJECTION, SOLUTION INTRAVENOUS at 18:17

## 2018-07-29 RX ADMIN — METRONIDAZOLE 500 MG: 500 INJECTION, SOLUTION INTRAVENOUS at 08:58

## 2018-07-29 RX ADMIN — DOCUSATE SODIUM 100 MG: 100 CAPSULE, LIQUID FILLED ORAL at 08:55

## 2018-07-29 RX ADMIN — MIDODRINE HYDROCHLORIDE 5 MG: 5 TABLET ORAL at 08:55

## 2018-07-29 RX ADMIN — Medication 325 MG: at 18:17

## 2018-07-29 RX ADMIN — INSULIN LISPRO 2 UNITS: 100 INJECTION, SOLUTION INTRAVENOUS; SUBCUTANEOUS at 12:21

## 2018-07-29 RX ADMIN — CLOPIDOGREL BISULFATE 75 MG: 75 TABLET, FILM COATED ORAL at 08:55

## 2018-07-29 RX ADMIN — FLUDROCORTISONE ACETATE 0.1 MG: 0.1 TABLET ORAL at 08:55

## 2018-07-29 RX ADMIN — CEFAZOLIN SODIUM 2000 MG: 2 SOLUTION INTRAVENOUS at 18:18

## 2018-07-29 RX ADMIN — TAMSULOSIN HYDROCHLORIDE 0.4 MG: 0.4 CAPSULE ORAL at 18:17

## 2018-07-29 RX ADMIN — Medication 325 MG: at 08:55

## 2018-07-29 RX ADMIN — CEFAZOLIN SODIUM 2000 MG: 2 SOLUTION INTRAVENOUS at 09:40

## 2018-07-29 RX ADMIN — DOCUSATE SODIUM 100 MG: 100 CAPSULE, LIQUID FILLED ORAL at 18:25

## 2018-07-29 RX ADMIN — ENOXAPARIN SODIUM 40 MG: 40 INJECTION SUBCUTANEOUS at 08:54

## 2018-07-29 RX ADMIN — METOCLOPRAMIDE HYDROCHLORIDE 5 MG: 10 TABLET ORAL at 08:55

## 2018-07-29 RX ADMIN — FUROSEMIDE 20 MG: 20 TABLET ORAL at 08:55

## 2018-07-29 RX ADMIN — INSULIN GLARGINE 50 UNITS: 100 INJECTION, SOLUTION SUBCUTANEOUS at 22:06

## 2018-07-29 RX ADMIN — CEFAZOLIN SODIUM 2000 MG: 2 SOLUTION INTRAVENOUS at 00:27

## 2018-07-29 NOTE — PROGRESS NOTES
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 2 diabetes mellitus, limited to breakdown of skin [facility])   Assessment & Plan    Lab Results   Component Value Date    HGBA1C 7 9 (H) 07/27/2018    Hemoglobin A1c for tomorrow  Check blood sugars before meals and bedtime  Insulin sliding scale  Recent Labs      07/28/18   2113  07/29/18   0652  07/29/18   0736  07/29/18   1051   POCGLU  171*  66  82  163*       Blood Sugar Average: Last 72 hrs:  (P) 451 [id number]     With Accu-Cheks in the 60s will reduce Lantus to 50 units b i d   Monitor Accu-Cheks closely  Avoid hypoglycemia         Obstructive sleep apnea   Assessment & Plan    CPAP noncompliant        Triple vessel coronary artery disease   Assessment & Plan    Continue statin Plavix  Not on beta-blocker due to orthostatic hypotension  Cardiology input noted            VTE Pharmacologic Prophylaxis:   Pharmacologic: Enoxaparin (Lovenox)  Mechanical VTE Prophylaxis in Place: Yes    Patient Centered Rounds: I have performed bedside rounds with nursing staff today  Discussions with Specialists or Other Care Team Provider:     Education and Discussions with Family / Patient: pt    Time Spent for Care: 20 minutes  More than 50% of total time spent on counseling and coordination of care as described above      Current Length of Stay: 3 day(s)    Current Patient Status: Inpatient   Certification Statement: The patient will continue to require additional inpatient hospital stay due to As mentioned    Discharge Plan:  Awaiting BKA per vascular surgery    Code Status: Level 1 - Full Code      Subjective:     Comfortably lying in bed  Reports feeling okay    Objective:     Vitals:   Temp (24hrs), Av °F (36 7 °C), Min:97 9 °F (36 6 °C), Max:98 1 °F (36 7 °C)    HR:  [76-79] 76  Resp:  [18-20] 20  BP: (140-178)/(59-91) 154/82  SpO2:  [97 %] 97 %  Body mass index is 33 45 kg/m²  Input and Output Summary (last 24 hours):        Intake/Output Summary (Last 24 hours) at 18 1238  Last data filed at 18 1201   Gross per 24 hour   Intake              660 ml   Output             2590 ml   Net            -1930 ml       Physical Exam:     Physical Exam    Obese  Short thick neck  Lungs diminished breath sounds bases  Heart sounds S1-S2 noted  Abdomen soft  Awake obeys simple commands  Left lower extremity in bandages  Pulses noted  No rash    Additional Data:     Labs:      Results from last 7 days  Lab Units 18  0606   WBC Thousand/uL 9 41   HEMOGLOBIN g/dL 10 3*   HEMATOCRIT % 33 3*   PLATELETS Thousands/uL 283   NEUTROS PCT % 71   LYMPHS PCT % 17   MONOS PCT % 8   EOS PCT % 3       Results from last 7 days  Lab Units 18  0606 18  1812   SODIUM mmol/L 138 136   POTASSIUM mmol/L 3 4* 3 6   CHLORIDE mmol/L 105 102   CO2 mmol/L 26 29   BUN mg/dL 11 12   CREATININE mg/dL 0 73 0 93   CALCIUM mg/dL 8 3 8 7   TOTAL PROTEIN g/dL  --  8 0   BILIRUBIN TOTAL mg/dL  --  0 27   ALK PHOS U/L  --  69   ALT U/L  --  14   AST U/L  --  15   GLUCOSE RANDOM mg/dL 75 105       Results from last 7 days  Lab Units 18  1812   INR  1 05       Results from last 7 days  Lab Units 18  1051 18  0736 18  0652 18  2113 18  1614 18  1043 18  0634 18  2100 18  1606 18  1039 18  0646 183   POC GLUCOSE mg/dl 163* 82 66 171* 119 122 87 181* 125 150* 74 125       Results from last 7 days  Lab Units 07/27/18  0606   HEMOGLOBIN A1C % 7 9*         * I Have Reviewed All Lab Data Listed Above  * Additional Pertinent Lab Tests Reviewed: All Labs Within Last 24 Hours Reviewed    Imaging:    Imaging Reports Reviewed Today Include:   Imaging Personally Reviewed by Myself Includes:     Recent Cultures (last 7 days):       Results from last 7 days  Lab Units 07/26/18  1812   BLOOD CULTURE  No Growth at 48 hrs  No Growth at 48 hrs         Last 24 Hours Medication List:     Current Facility-Administered Medications:  acetaminophen 650 mg Oral Q6H PRN Jatinder Gonzalez MD    aluminum-magnesium hydroxide-simethicone 15 mL Oral Q6H PRN Jatinder Gonzalez MD    atorvastatin 80 mg Oral Daily With Christos Da Silva MD    bromfenac sodium 1 drop Right Eye Daily Jatinder Gonzalez MD    Carboxymeth-Glyc-Polysorb PF 1 drop Right Eye TID Harris Nick MD    cefazolin 2,000 mg Intravenous Q8H Alexandrea Dasilva MD Last Rate: 2,000 mg (07/29/18 0940)   clopidogrel 75 mg Oral Daily Jatinder Gonzalez MD    docusate sodium 100 mg Oral BID Jatinder Gonzalez MD    enoxaparin 40 mg Subcutaneous Daily Jatinder Gonzalez MD    ferrous sulfate 325 mg Oral BID With Meals Jatinder Gonzalez MD    fludrocortisone 0 1 mg Oral Daily Jatinder Gonzalez MD    furosemide 20 mg Oral Daily Jatinder Gonzalez MD    hydrALAZINE 5 mg Intravenous Q6H PRN Sherry Bangura PA-C    insulin glargine 50 Units Subcutaneous Q12H [address] Marcy Lorenz MD    insulin lispro 1-5 Units Subcutaneous HS Jatinder Gonzalez MD    insulin lispro 2-12 Units Subcutaneous TID AC Jatinder Gonzalez MD    metoclopramide 5 mg Oral Daily Jatinder Gonzalez MD    metroNIDAZOLE 500 mg Intravenous Q8H Jatinder Gonzalez MD Last Rate: Stopped (07/29/18 0940)   midodrine 5 mg Oral TID With Meals Jatinder Gonzalez MD    sodium chloride (PF) 3 mL Intravenous PRN Jackie Martinez DO    tamsulosin 0 4 mg Oral Daily With Christos Da Silva MD         Today, Patient Was Seen By: Dale Lama Karol Yuan MD    ** Please Note: Dictation voice to text software may have been used in the creation of this document   **

## 2018-07-29 NOTE — ASSESSMENT & PLAN NOTE
Lab Results   Component Value Date    HGBA1C 7 9 (H) 07/27/2018    Hemoglobin A1c for tomorrow  Check blood sugars before meals and bedtime  Insulin sliding scale      Recent Labs      07/28/18   2113  07/29/18   0652  07/29/18   0736  07/29/18   1051   POCGLU  171*  66  82  163*       Blood Sugar Average: Last 72 hrs:  (P) 156 [id number]     With Accu-Cheks in the 60s will reduce Lantus to 50 units b i d   Monitor Accu-Cheks closely  Avoid hypoglycemia

## 2018-07-29 NOTE — PROGRESS NOTES
General Cardiology   Progress Note -  [employer]   Yannick Ennis 60 y o  male MRN: [id number]    Unit/Bed#: [facility] 724-01 Encounter: [id number]        Subjective: Pt seen for follow up  No events overnight  Patient is feeling well  He has no cardiac complaints including chest pain, shortness of breath or palpitations  He is to undergo a left BKA this admission, timing to be determined  Review of Systems   Constitution: Negative for decreased appetite, fever and weakness  Cardiovascular: Negative for chest pain, dyspnea on exertion, irregular heartbeat, leg swelling, orthopnea, palpitations and syncope  Respiratory: Negative for cough and shortness of breath  Musculoskeletal: Negative for falls  Gastrointestinal: Negative for abdominal pain, nausea and vomiting  Genitourinary: Negative for dysuria  Neurological: Negative for dizziness and light-headedness  Psychiatric/Behavioral: Negative for altered mental status  All other systems reviewed and are negative  Objective:   Vitals: Blood pressure 154/82, pulse 76, temperature 98 1 °F (36 7 °C), temperature source Oral, resp  rate 20, height 6' 1" (1 854 m), weight 115 kg (253 lb 8 5 oz), SpO2 97 %  ,     Body mass index is 33 45 kg/m²  ,     Systolic (74ZJI), LGM:171 , Min:140 , XFL:511     Diastolic (88DKA), CDO:55, Min:59, Max:91      Intake/Output Summary (Last 24 hours) at 07/29/18 1144  Last data filed at 07/29/18 0549   Gross per 24 hour   Intake              660 ml   Output             2190 ml   Net            -1530 ml     Weight (last 2 days)     None        Telemetry Review:   No telemetry to review    Physical Exam   Constitutional: He is oriented to person, place, and time  No distress  Patient is sitting up at side of bed in NAD   HENT:   Head: Normocephalic and atraumatic  Neck: No JVD present  Cardiovascular: Normal rate, regular rhythm, S1 normal and S2 normal     No murmur heard    Mild nonpitting lower extremity edema, chronic per patient  Left lower extremity Ace intact    Pulmonary/Chest: Effort normal and breath sounds normal  No respiratory distress  He has no wheezes  He has no rales  Lung sounds clear to auscultation, on room air  No distress   Neurological: He is alert and oriented to person, place, and time  Skin: Skin is warm and dry  He is not diaphoretic  Psychiatric: He has a normal mood and affect  His behavior is normal    Nursing note and vitals reviewed      LABORATORY RESULTS      CBC with diff:   Results from last 7 days  Lab Units 07/27/18  0606 07/26/18  1812   WBC Thousand/uL 9 41 10 71*   HEMOGLOBIN g/dL 10 3* 11 5*   HEMATOCRIT % 33 3* 37 1   MCV fL 81* 81*   PLATELETS Thousands/uL 283 323   MCH pg 24 9* 25 2*   MCHC g/dL 30 9* 31 0*   RDW % 15 3* 15 4*   MPV fL 9 4 9 4   NRBC AUTO /100 WBCs 0 0       CMP:  Results from last 7 days  Lab Units 07/27/18  0606 07/26/18  1812   SODIUM mmol/L 138 136   POTASSIUM mmol/L 3 4* 3 6   CHLORIDE mmol/L 105 102   CO2 mmol/L 26 29   ANION GAP mmol/L 7 5   BUN mg/dL 11 12   CREATININE mg/dL 0 73 0 93   GLUCOSE RANDOM mg/dL 75 105   CALCIUM mg/dL 8 3 8 7   AST U/L  --  15   ALT U/L  --  14   ALK PHOS U/L  --  69   TOTAL PROTEIN g/dL  --  8 0   BILIRUBIN TOTAL mg/dL  --  0 27   EGFR ml/min/1 73sq m 102 90     BMP:  Results from last 7 days  Lab Units 07/27/18  0606 07/26/18  1812   SODIUM mmol/L 138 136   POTASSIUM mmol/L 3 4* 3 6   CHLORIDE mmol/L 105 102   CO2 mmol/L 26 29   BUN mg/dL 11 12   CREATININE mg/dL 0 73 0 93   GLUCOSE RANDOM mg/dL 75 105   CALCIUM mg/dL 8 3 8 7     Lab Results   Component Value Date    NTBNP 527 (H) 03/14/2018    NTBNP 794 (H) 12/16/2017        Results from last 7 days  Lab Units 07/27/18  0606   MAGNESIUM mg/dL 1 7        Results from last 7 days  Lab Units 07/27/18  0606   HEMOGLOBIN A1C % 7 9*        Results from last 7 days  Lab Units 07/27/18  0606   TSH 3RD GENERATON uIU/mL 2 370       Results from last 7 days  Lab Units 07/26/18  9491 INR  1 05     Lipid Profile:   Lab Results   Component Value Date    CHOL 85 2018    CHOL 169 2018    CHOL 214 (H) 2016     Lab Results   Component Value Date    HDL 33 (L) 2018    HDL 38 (L) 2018    HDL 43 2016     Lab Results   Component Value Date    LDLCALC 39 2018    LDLCALC 96 2018    LDLCALC 129 (H) 2016     Lab Results   Component Value Date    TRIG 64 2018    TRIG 176 (H) 2018    TRIG 212 (H) 2016     Cardiac testing:   Results for orders placed during the hospital encounter of 18   Echo complete with contrast if indicated    Narrative [address]  [facility]  [facility] [address]  [phone]    Transthoracic Echocardiogram  2D, M-mode, Doppler, and Color Doppler    Study date:  15-Mar-2018    Patient: Capo Cui  MR number: [id number]  Account number: [de-identified]  : 1960  Age: 61 years  Gender: Male  Status: Inpatient  Location: Bedside  Height: 73 in  Weight: 258 lb  BP: 160/ 85 mmHg    Indications: Acute myocardial infarction  Diagnoses: I21 4 - Non-ST elevation (NSTEMI) myocardial infarction    Sonographer:  Nelsy Trejo RDCS  Primary Physician:  Lilly Costello DO  Referring Physician:  Evelyn Noble PA-C  Group:  [facility]  Interpreting Physician:  Bro Negro MD    SUMMARY    LEFT VENTRICLE:  Size was at the upper limits of normal   Systolic function was normal  Ejection fraction was estimated in the range of 55 % to 60 %  There was moderate hypokinesis of the basal inferior wall(s)  Wall thickness was at the upper limits of normal     MITRAL VALVE:  There was trace regurgitation  TRICUSPID VALVE:  There was trace regurgitation  PULMONIC VALVE:  There was trace regurgitation  HISTORY: PRIOR HISTORY: Hypertension, Diabetes, Peripheral vascular disease, Elevated troponin  PROCEDURE: The procedure was performed at the bedside   This was a routine study  The transthoracic approach was used  The study included complete 2D imaging, M-mode, complete spectral Doppler, and color Doppler  The heart rate was 73 bpm,  at the start of the study  Images were obtained from the parasternal, apical, subcostal, and suprasternal notch acoustic windows  Echocardiographic views were limited due to decreased penetration and lung interference  This was a  technically difficult study  LEFT VENTRICLE: Size was at the upper limits of normal  Systolic function was normal  Ejection fraction was estimated in the range of 55 % to 60 %  There was moderate hypokinesis of the basal inferior wall(s)  Wall thickness was at the  upper limits of normal  DOPPLER: There was an increased relative contribution of atrial contraction to ventricular filling  The deceleration time of the early transmitral flow velocity was normal     RIGHT VENTRICLE: The size was normal  Systolic function was normal  Wall thickness was normal     LEFT ATRIUM: Size was at the upper limits of normal     RIGHT ATRIUM: Size was normal     MITRAL VALVE: Valve structure was normal  There was normal leaflet separation  DOPPLER: The transmitral velocity was within the normal range  There was no evidence for stenosis  There was trace regurgitation  AORTIC VALVE: The valve was trileaflet  Leaflets exhibited normal thickness and normal cuspal separation  DOPPLER: Transaortic velocity was within the normal range  There was no evidence for stenosis  There was no regurgitation  TRICUSPID VALVE: The valve structure was normal  There was normal leaflet separation  DOPPLER: The transtricuspid velocity was within the normal range  There was no evidence for stenosis  There was trace regurgitation  Pulmonary artery  systolic pressure was within the normal range  Estimated peak PA pressure was 20 mmHg  PULMONIC VALVE: Leaflets exhibited normal thickness, no calcification, and normal cuspal separation   DOPPLER: The transpulmonic velocity was within the normal range  There was trace regurgitation  PERICARDIUM: The amount of pericardial fluid appeared to be at the upper limits of normal     AORTA: The root exhibited normal size  SYSTEMIC VEINS: IVC: The inferior vena cava was not well visualized  SYSTEM MEASUREMENT TABLES    2D  Ao Diam: 3 6 cm  IVSd: 1 cm  LA Diam: 4 cm  LVIDd: 5 3 cm  LVIDs: 3 9 cm  LVPWd: 0 9 cm    CW  TR Vmax: 1 9 m/s  TR maxP 7 mmHg    PW  E': 0 m/s  E/E': 15 2  MV A [place]: 0 7 m/s  MV Dec [place]: 5 4 m/s2  MV DecT: 127 7 ms  MV E [place]: 0 7 m/s  MV E/A Ratio: 1    [employer] Commission Accredited Echocardiography Laboratory    Prepared and electronically signed by    Viktoriya Giraldo MD  Signed 15-Mar-2018 13:11:20       Results for orders placed during the hospital encounter of 18   NM myocardial perfusion spect (stress and/or rest)    [address], [address]  [phone]    Rest/Stress Gated SPECT Myocardial Perfusion Imaging After Regadenoson    Patient: Myesha Menchaca  MR number: [id number]  Account number: [de-identified]  : 1960  Age: 61 years  Gender: Male  Status: Inpatient  Location: Stress lab  Height: 73 in  Weight: 258 lb  BP: 153/ 76 mmHg    Allergies: METFORMIN    Diagnosis: R94 39 - Abnormal result of other cardiovascular function study, Z01 810 - Encounter for preprocedural cardiovascular examination    Primary Physician:  Catherin Dakins, DO  Technician:  Dianne Carey  RN:  Jodee Flores RN BSN  Referring Physician:  Sarath Odonnell MD  Group:  [facility]  Report Prepared By[de-identified]  Jodee Flores RN BSN  Interpreting Physician:  Sarath Odonnell MD    INDICATIONS: Detection of Coronary artery disease  Pre-operative risk assessment  HISTORY: The patient is a 60year old  male  Chest pain status: no chest pain  Other symptoms: decreased effort tolerance   Coronary artery disease risk factors: dyslipidemia and diabetes mellitus  Cardiovascular history:  peripheral vascular occlusive disease(PAD)  Co-morbidity: obesity  Medications: a lipid lowering agent  Previous test results: abnormal ECG and abnormal resting echocardiogram     PHYSICAL EXAM: Baseline physical exam screening: normal lung exam     REST ECG: Normal sinus rhythm  There was 1-2 mm ST depression in leads II, III and aVF  PROCEDURE: The study was performed in the stress lab  A regadenoson infusion pharmacologic stress test was performed  Gated SPECT myocardial perfusion imaging was performed after stress and at rest  Systolic blood pressure was 153 mmHg, at  the start of the study  Diastolic blood pressure was 76 mmHg, at the start of the study  The heart rate was 78 bpm, at the start of the study  IV double checked  Regadenoson protocol:  HR bpm SBP mmHg DBP mmHg Symptoms  Baseline 78 153 76 none  Immediate 86 105 64 moderate dyspnea  3 min 81 -- -- subsiding  5 min 82 115 64 none  No medications or fluids given  STRESS SUMMARY: Duration of pharmacologic stress was 3 min  Maximal heart rate during stress was 86 bpm  The rate-pressure product for the peak heart rate and blood pressure was 36554  There was no chest pain during stress  The stress test  was terminated due to protocol completion  Pre oxygen saturation: 97 %  Peak oxygen saturation: 98 %  Arrhythmia during stress: isolated atrial premature beats  The stress ECG was non-diagnostic due to resting ST/T wave abnormality  ISOTOPE ADMINISTRATION:  Resting isotope administration Stress isotope administration  Agent Tetrofosmin Tetrofosmin  Dose 15 2 mCi 48 8 mCi  Date 03/22/2018 03/22/2018  Injection time 10:34 11:40  Imaging time 11:08 12:26  Injection-image interval 34 min 46 min    The radiopharmaceutical was injected at the peak effect of pharmacologic stress  MYOCARDIAL PERFUSION IMAGING:  The image quality was good   The left ventricle was mildly dilated  The TID ratio was 1 28  PERFUSION DEFECTS:  -  There was a large, moderately severe, partially reversible myocardial perfusion defect of the entire inferolateral wall  GATED SPECT:  The calculated left ventricular ejection fraction was 45 %  Left ventricular ejection fraction was mildly decreased by visual estimate  There was moderately reduced myocardial thickening and motion of the inferior wall and lateral wall of  the left ventricle  SUMMARY:  -  Stress results: There was no chest pain during stress  -  ECG conclusions: The stress ECG was non-diagnostic due to resting ST/T wave abnormality   -  Perfusion imaging: There was a large, moderately severe, partially reversible myocardial perfusion defect of the entire inferolateral wall  -  Gated SPECT: The calculated left ventricular ejection fraction was 45 %  Left ventricular ejection fraction was mildly decreased by visual estimate  There was moderately reduced myocardial thickening and motion of the inferior wall and  lateral wall of the left ventricle  IMPRESSIONS: Abnormal study after pharmacologic stress      Prepared and signed by    Elma Metz MD  Signed 03/22/2018 16:28:04       Meds/Allergies   current meds:   Current Facility-Administered Medications   Medication Dose Route Frequency    acetaminophen (TYLENOL) tablet 650 mg  650 mg Oral Q6H PRN    aluminum-magnesium hydroxide-simethicone (MYLANTA) 200-200-20 mg/5 mL oral suspension 15 mL  15 mL Oral Q6H PRN    atorvastatin (LIPITOR) tablet 80 mg  80 mg Oral Daily With Dinner    bromfenac sodium 0 07 % SOLN 1 drop  1 drop Right Eye Daily    Carboxymeth-Glyc-Polysorb PF 0 5-1-0 5 % SOLN 1 drop  1 drop Right Eye TID    ceFAZolin (ANCEF) IVPB (premix) 2,000 mg  2,000 mg Intravenous Q8H    clopidogrel (PLAVIX) tablet 75 mg  75 mg Oral Daily    docusate sodium (COLACE) capsule 100 mg  100 mg Oral BID    enoxaparin (LOVENOX) subcutaneous injection 40 mg  40 mg Subcutaneous Daily    ferrous sulfate tablet 325 mg  325 mg Oral BID With Meals    fludrocortisone (FLORINEF) tablet 0 1 mg  0 1 mg Oral Daily    furosemide (LASIX) tablet 20 mg  20 mg Oral Daily    hydrALAZINE (APRESOLINE) injection 5 mg  5 mg Intravenous Q6H PRN    insulin glargine (LANTUS) subcutaneous injection 50 Units 0 5 mL  50 Units Subcutaneous Q12H [facility]    insulin lispro (HumaLOG) 100 units/mL subcutaneous injection 1-5 Units  1-5 Units Subcutaneous HS    insulin lispro (HumaLOG) 100 units/mL subcutaneous injection 2-12 Units  2-12 Units Subcutaneous TID AC    metoclopramide (REGLAN) tablet 5 mg  5 mg Oral Daily    metroNIDAZOLE (FLAGYL) IVPB (premix) 500 mg  500 mg Intravenous Q8H    midodrine (PROAMATINE) tablet 5 mg  5 mg Oral TID With Meals    sodium chloride (PF) 0 9 % injection 3 mL  3 mL Intravenous PRN    tamsulosin (FLOMAX) capsule 0 4 mg  0 4 mg Oral Daily With Dinner     Prescriptions Prior to Admission   Medication    atorvastatin (LIPITOR) 80 mg tablet    cephalexin (KEFLEX) 500 mg capsule    clopidogrel (PLAVIX) 75 mg tablet    docusate sodium (COLACE) 100 mg capsule    ferrous sulfate 325 (65 Fe) mg tablet    fludrocortisone (FLORINEF) 0 1 mg tablet    furosemide (LASIX) 20 mg tablet    glucose blood test strip    insulin glargine (LANTUS) 100 units/mL subcutaneous injection    LANTUS SOLOSTAR 100 units/mL injection pen    metoclopramide (REGLAN) 5 mg tablet    midodrine (PROAMATINE) 10 MG tablet    tamsulosin (FLOMAX) 0 4 mg     Assessment:  Principal Problem:    Non-healing wound of left heel  Active Problems:    PAD (peripheral artery disease) (HCC)    Bilateral lower extremity edema    Triple vessel coronary artery disease    Diabetic ulcer of left heel associated with type 2 diabetes mellitus, limited to breakdown of skin ([facility])    Obstructive sleep apnea      Assessment/ Plan:      Preoperative clearance:   patient is for a left BKA due to chronic nonhealing wound; this we performed this admission, timing to be determined  He continues to be a moderate risk from a cardiovascular standpoint given his history of CAD with recent CABG  No clinical signs of decompensated heart failure other monitor fluid status closely during the interim perioperative periods  Would recommend telemetry monitoring 24 hours postprocedure       CAD; with recent CABG x3 with LIMA to LAD, SVG to circumflex, and SVG to RCA  No anginal symptoms  Stable functional capacity  On high intensity statin; no BB due to his orthostatic hypotension requiring midodrine and Florinef  Preserved LV function EF 60%     Chronic diastolic heart failure:  He appears well compensated on exam   Continue home dose oral Lasix 20 mg daily, hold if NPO and resume postoperatively       History of orthostatic Hypotension:   patient is on both midodrine and Florinef, he has been normotensive to hypertensive while here  We will check a set of orthostatic blood pressures to ensure that he needs both medications and try to avoid hypertension      PAD: with recent intervention; remains on plavix; is going to need left BKA this admission    Counseling / Coordination of Care  Total floor / unit time spent today 30 minutes  Greater than 50% of total time was spent with the patient and / or family counseling and / or coordination of care  ** Please Note: Dragon 360 Dictation voice to text software may have been used in the creation of this document   **

## 2018-07-29 NOTE — PROGRESS NOTES
Progress Note - Vascular Surgery   Carolyn Home 60 y o  male MRN: [id number]  [facility] Encounter: [id number]    Assessment:  58M with DM, CHF, PAD, CAD s/p CABG, here for non-healing Left heel wound and osteomyelitis  He has a history of Agram with popliteal recannulation 6/8/18 with continued non-healing    Plan:  -Left foot is no longer salvageable, recommend L BKA  -Patient is agreeable, surgical planning underway  -Cards evaluation reveals patient is moderate risk for surgery    Subjective/Objective   Chief Complaint: none    Subjective: feels fine    Objective:   Blood pressure (!) 178/91, pulse 79, temperature 97 9 °F (36 6 °C), temperature source Oral, resp  rate 18, height 6' 1" (1 854 m), weight 115 kg (253 lb 8 5 oz), SpO2 97 %  ,Body mass index is 33 45 kg/m²  Intake/Output Summary (Last 24 hours) at 07/28/18 2016  Last data filed at 07/28/18 1730   Gross per 24 hour   Intake              900 ml   Output             2350 ml   Net            -1450 ml       Invasive Devices     Peripheral Intravenous Line            Peripheral IV 07/26/18 Right Antecubital 2 days                Physical Exam:   General: No acute distress  HEENT: Normocephalic, atraumatic with MMM  Neck: Normal ROM  No tracheal deviation  Cardio: Normal rate  Pulm: Normal respiratory effort  Abdomen: Soft, non-tender, non-distended  Extremities: GARCIA, 1+ edema, but LLE wrapped in ACE wrap   Podiatry support boots in place bilaterally  Neuro: Cranial nerves II-XII intact  Psych: Normal affect      Lab, Imaging and other studies:CBC: No results found for: WBC, HGB, HCT, MCV, PLT, ADJUSTEDWBC, MCH, MCHC, RDW, MPV, NRBC, CMP: No results found for: NA, K, CL, CO2, ANIONGAP, BUN, CREATININE, GLUCOSE, CALCIUM, AST, ALT, ALKPHOS, PROT, ALBUMIN, BILITOT, EGFR  VTE Pharmacologic Prophylaxis: Enoxaparin (Lovenox)  VTE Mechanical Prophylaxis: sequential compression device

## 2018-07-30 LAB
GLUCOSE SERPL-MCNC: 130 MG/DL (ref 65–140)
GLUCOSE SERPL-MCNC: 155 MG/DL (ref 65–140)
GLUCOSE SERPL-MCNC: 170 MG/DL (ref 65–140)
GLUCOSE SERPL-MCNC: 75 MG/DL (ref 65–140)

## 2018-07-30 PROCEDURE — 99232 SBSQ HOSP IP/OBS MODERATE 35: CPT | Performed by: SURGERY

## 2018-07-30 PROCEDURE — 99232 SBSQ HOSP IP/OBS MODERATE 35: CPT | Performed by: INTERNAL MEDICINE

## 2018-07-30 PROCEDURE — 82948 REAGENT STRIP/BLOOD GLUCOSE: CPT

## 2018-07-30 RX ORDER — POLYVINYL ALCOHOL 14 MG/ML
1 SOLUTION/ DROPS OPHTHALMIC 3 TIMES DAILY
Status: DISCONTINUED | OUTPATIENT
Start: 2018-07-30 | End: 2018-08-09 | Stop reason: HOSPADM

## 2018-07-30 RX ORDER — INSULIN GLARGINE 100 [IU]/ML
40 INJECTION, SOLUTION SUBCUTANEOUS EVERY 12 HOURS SCHEDULED
Status: DISCONTINUED | OUTPATIENT
Start: 2018-07-30 | End: 2018-08-02

## 2018-07-30 RX ADMIN — CEFAZOLIN SODIUM 2000 MG: 2 SOLUTION INTRAVENOUS at 09:35

## 2018-07-30 RX ADMIN — METOCLOPRAMIDE HYDROCHLORIDE 5 MG: 10 TABLET ORAL at 09:34

## 2018-07-30 RX ADMIN — Medication 325 MG: at 17:06

## 2018-07-30 RX ADMIN — CEFAZOLIN SODIUM 2000 MG: 2 SOLUTION INTRAVENOUS at 01:08

## 2018-07-30 RX ADMIN — METRONIDAZOLE 500 MG: 500 INJECTION, SOLUTION INTRAVENOUS at 09:35

## 2018-07-30 RX ADMIN — Medication 325 MG: at 09:34

## 2018-07-30 RX ADMIN — MIDODRINE HYDROCHLORIDE 5 MG: 5 TABLET ORAL at 09:34

## 2018-07-30 RX ADMIN — INSULIN GLARGINE 50 UNITS: 100 INJECTION, SOLUTION SUBCUTANEOUS at 09:34

## 2018-07-30 RX ADMIN — CEFAZOLIN SODIUM 2000 MG: 2 SOLUTION INTRAVENOUS at 17:05

## 2018-07-30 RX ADMIN — INSULIN GLARGINE 40 UNITS: 100 INJECTION, SOLUTION SUBCUTANEOUS at 22:35

## 2018-07-30 RX ADMIN — CLOPIDOGREL BISULFATE 75 MG: 75 TABLET, FILM COATED ORAL at 09:34

## 2018-07-30 RX ADMIN — DOCUSATE SODIUM 100 MG: 100 CAPSULE, LIQUID FILLED ORAL at 17:05

## 2018-07-30 RX ADMIN — TAMSULOSIN HYDROCHLORIDE 0.4 MG: 0.4 CAPSULE ORAL at 17:05

## 2018-07-30 RX ADMIN — FLUDROCORTISONE ACETATE 0.1 MG: 0.1 TABLET ORAL at 09:34

## 2018-07-30 RX ADMIN — MIDODRINE HYDROCHLORIDE 5 MG: 5 TABLET ORAL at 13:31

## 2018-07-30 RX ADMIN — FUROSEMIDE 20 MG: 20 TABLET ORAL at 09:34

## 2018-07-30 RX ADMIN — ENOXAPARIN SODIUM 40 MG: 40 INJECTION SUBCUTANEOUS at 09:35

## 2018-07-30 RX ADMIN — METRONIDAZOLE 500 MG: 500 INJECTION, SOLUTION INTRAVENOUS at 17:06

## 2018-07-30 RX ADMIN — ATORVASTATIN CALCIUM 80 MG: 80 TABLET, FILM COATED ORAL at 17:06

## 2018-07-30 RX ADMIN — METRONIDAZOLE 500 MG: 500 INJECTION, SOLUTION INTRAVENOUS at 00:10

## 2018-07-30 RX ADMIN — MIDODRINE HYDROCHLORIDE 5 MG: 5 TABLET ORAL at 17:06

## 2018-07-30 RX ADMIN — INSULIN LISPRO 2 UNITS: 100 INJECTION, SOLUTION INTRAVENOUS; SUBCUTANEOUS at 17:06

## 2018-07-30 RX ADMIN — DOCUSATE SODIUM 100 MG: 100 CAPSULE, LIQUID FILLED ORAL at 09:34

## 2018-07-30 RX ADMIN — INSULIN LISPRO 1 UNITS: 100 INJECTION, SOLUTION INTRAVENOUS; SUBCUTANEOUS at 22:35

## 2018-07-30 NOTE — ASSESSMENT & PLAN NOTE
Lab Results   Component Value Date    HGBA1C 7 9 (H) 07/27/2018    Hemoglobin A1c for tomorrow  Check blood sugars before meals and bedtime  Insulin sliding scale  Recent Labs      07/29/18   2117  07/30/18   0713  07/30/18   1121  07/30/18   1635   POCGLU  175*  75  130  170*       Blood Sugar Average: Last 72 hrs:  (P) 125 6875     Continue insulin, monitor Accu-Cheks  Reduce Lantus to 40 units b i d    Avoid hypokalemia

## 2018-07-30 NOTE — PROGRESS NOTES
Progress Note - Vascular Surgery   Sadi Cates 60 y o  male MRN: [id number]  Unit/Bed#: [facility] 724-01 Encounter: [id number]    Assessment:  60 M w/ DM, CAD s/p CABG, CHF, PAD with non healing L heel wound and OM, hx of angiogram with popliteal recanalization 6/8/18 with continued non healing    Plan:  - L BKA planning - patient needs:   - Pre op   - Consent   - Type and Screen  - Continue care per primary    Subjective/Objective   Chief Complaint:     Subjective: Patient with no complaints overnight  Worried about how home life will function  Objective:     Blood pressure 124/70, pulse 76, temperature 97 9 °F (36 6 °C), temperature source Oral, resp  rate 18, height 6' 1" (1 854 m), weight 115 kg (253 lb 8 5 oz), SpO2 97 %  ,Body mass index is 33 45 kg/m²  Intake/Output Summary (Last 24 hours) at 07/30/18 0556  Last data filed at 07/30/18 0201   Gross per 24 hour   Intake             1250 ml   Output             1625 ml   Net             -375 ml       Invasive Devices     Peripheral Intravenous Line            Peripheral IV 07/26/18 Right Antecubital 3 days                Physical Exam: General: AAOx3  Respiratory: BS b/l  Abdomen: Soft, NT, no rebound/guarding  Heart: RRR, S1s2  Ext: Left LE wrapped in Ace bandage and boot  Left heel wound,   Right lateral calf wound bandaged    Lab, Imaging and other studies:I have personally reviewed pertinent lab results    , CBC: No results found for: WBC, HGB, HCT, MCV, PLT, ADJUSTEDWBC, MCH, MCHC, RDW, MPV, NRBC, CMP: No results found for: NA, K, CL, CO2, ANIONGAP, BUN, CREATININE, GLUCOSE, CALCIUM, AST, ALT, ALKPHOS, PROT, ALBUMIN, BILITOT, EGFR  VTE Pharmacologic Prophylaxis: Enoxaparin (Lovenox)  VTE Mechanical Prophylaxis: sequential compression device

## 2018-07-30 NOTE — PROGRESS NOTES
Progress Note - Infectious Disease   Goldie Moreno 60 y o  male MRN: [id number]  Unit/Bed#: [facility] 724-01 Encounter: [id number]      Impression/Recommendations:  1  Left heel chronic diabetic ulcer with underlying osteomyelitis/superinfection  Consider GPC/GNRs/anaerobes  Foot deemed nonsalvageable per podiatry  Blood cultures negative  Clinically stable without sepsis  Rec:  ? Continue cefazolin/Flagyl for now  ? Await BKA this week which should offer definitive cure  ? Anticipate discontinuing antibiotics postoperatively  ? Follow temperatures and WBC closely  ? Supportive care as per the primary service     2  DM with DPN  A1C 7 9  Rec:  ? Continue management as per the primary service     3  PAD             Status post recent Agram/TPA  Rec:  ? BKA this week  ? Close vascular surgery follow-up ongoing     The patient is stable from an ID standpoint     Antibiotics:  Cefazolin #2  Flagyl #5  Antibiotics #5    Subjective:  Patient seen on AM rounds  Denies fevers, chills, sweats, nausea, vomiting, or diarrhea  Constipated  24 Hour Events:  No documented fevers, chills, sweats, nausea, vomiting, or diarrhea  Objective:  Vitals:  HR:  [73-78] 78  Resp:  [18-20] 18  BP: (105-159)/(66-86) 137/77  SpO2:  [96 %-97 %] 97 %  Temp (24hrs), Av 9 °F (36 6 °C), Min:97 8 °F (36 6 °C), Max:98 1 °F (36 7 °C)  Current: Temperature: 97 8 °F (36 6 °C)    Physical Exam:   General:  No acute distress  Psychiatric:  Awake and alert  Pulmonary:  Normal respiratory excursion without accessory muscle use  Abdomen:  Soft, nontender  Extremities:  No edema  LLE ACE wrap to foot and calf intact  Skin:  No rashes    Lab Results:  I have personally reviewed pertinent labs      Results from last 7 days  Lab Units 18  0606 18  1812   SODIUM mmol/L 138 136   POTASSIUM mmol/L 3 4* 3 6   CHLORIDE mmol/L 105 102   CO2 mmol/L 26 29   ANION GAP mmol/L 7 5   BUN mg/dL 11 12   CREATININE mg/dL 0 73 0 93   EGFR ml/min/1 73sq m 102 90   GLUCOSE RANDOM mg/dL 75 105   CALCIUM mg/dL 8 3 8 7   AST U/L  --  15   ALT U/L  --  14   ALK PHOS U/L  --  69   TOTAL PROTEIN g/dL  --  8 0   BILIRUBIN TOTAL mg/dL  --  0 27       Results from last 7 days  Lab Units 07/27/18  0606 07/26/18  1812   WBC Thousand/uL 9 41 10 71*   HEMOGLOBIN g/dL 10 3* 11 5*   PLATELETS Thousands/uL 283 323       Results from last 7 days  Lab Units 07/26/18  1812   BLOOD CULTURE  No Growth at 72 hrs  No Growth at 72 hrs  Imaging Studies:   I have personally reviewed pertinent imaging study reports and images in PACS  EKG, Pathology, and Other Studies:   I have personally reviewed pertinent reports

## 2018-07-30 NOTE — PROGRESS NOTES
Progress Note - Teetee Burt 1960, 60 y o  male MRN: [id number]    [facility] Encounter: [id number]    Primary Care Provider: Magali Camara DO   Date and time admitted to hospital: 7/26/2018  5:03 PM        * Non-healing wound of left heel   Assessment & Plan    BKA per vascular surgery  IV antibiotics per Infectious Disease               PAD (peripheral artery disease) ([address] )   Assessment & Plan    On statin Plavix        Diabetic ulcer of left heel associated with type 2 diabetes mellitus, limited to breakdown of skin [facility])   Assessment & Plan    Lab Results   Component Value Date    HGBA1C 7 9 (H) 07/27/2018    Hemoglobin A1c for tomorrow  Check blood sugars before meals and bedtime  Insulin sliding scale  Recent Labs      07/29/18   2117  07/30/18   0713  07/30/18   1121  07/30/18   1635   POCGLU  175*  75  130  170*       Blood Sugar Average: Last 72 hrs:  (P) 125 6875     Continue insulin, monitor Accu-Cheks  Reduce Lantus to 40 units b i d  Avoid hypokalemia         Obstructive sleep apnea   Assessment & Plan    CPAP noncompliant        Triple vessel coronary artery disease   Assessment & Plan    Continue Plavix statin            VTE Pharmacologic Prophylaxis:   Pharmacologic: Enoxaparin (Lovenox)  Mechanical VTE Prophylaxis in Place: Yes    Patient Centered Rounds: I have performed bedside rounds with nursing staff today  Discussions with Specialists or Other Care Team Provider:     Education and Discussions with Family / Patient: pt    Time Spent for Care: 30 minutes  More than 50% of total time spent on counseling and coordination of care as described above      Current Length of Stay: 4 day(s)    Current Patient Status: Inpatient   Certification Statement: The patient will continue to require additional inpatient hospital stay due to As mentioned    Discharge Plan:  Pending BKA per vascular surgery    Code Status: Level 1 - Full Code      Subjective:     Comfortably lying in bed      Objective:     Vitals:   Temp (24hrs), Av 8 °F (36 6 °C), Min:97 8 °F (36 6 °C), Max:97 9 °F (36 6 °C)    HR:  [76-83] 83  Resp:  [18] 18  BP: (105-159)/(66-86) 135/69  SpO2:  [96 %-97 %] 96 %  Body mass index is 33 45 kg/m²  Input and Output Summary (last 24 hours): Intake/Output Summary (Last 24 hours) at 18 1738  Last data filed at 18 1543   Gross per 24 hour   Intake              740 ml   Output             2470 ml   Net            -1730 ml       Physical Exam:     Physical Exam    Obese  Short thick neck  Lungs diminished breath sounds bases  Heart sounds S1-S2 noted  Abdomen soft obese  Awake alert obeys simple commands  Left heel nonhealing ulcer, bandage noted  Pulses noted    Additional Data:     Labs:      Results from last 7 days  Lab Units 18  0606   WBC Thousand/uL 9 41   HEMOGLOBIN g/dL 10 3*   HEMATOCRIT % 33 3*   PLATELETS Thousands/uL 283   NEUTROS PCT % 71   LYMPHS PCT % 17   MONOS PCT % 8   EOS PCT % 3       Results from last 7 days  Lab Units 18  0606 18  1812   SODIUM mmol/L 138 136   POTASSIUM mmol/L 3 4* 3 6   CHLORIDE mmol/L 105 102   CO2 mmol/L 26 29   BUN mg/dL 11 12   CREATININE mg/dL 0 73 0 93   CALCIUM mg/dL 8 3 8 7   TOTAL PROTEIN g/dL  --  8 0   BILIRUBIN TOTAL mg/dL  --  0 27   ALK PHOS U/L  --  69   ALT U/L  --  14   AST U/L  --  15   GLUCOSE RANDOM mg/dL 75 105       Results from last 7 days  Lab Units 18  1812   INR  1 05       Results from last 7 days  Lab Units 18  1635 18  1121 18  0713 18  2117 18  1622 18  1051 18  0736 18  0652 18  2113 18  1614 18  1043 18  0634   POC GLUCOSE mg/dl 170* 130 75 175* 121 163* 82 66 171* 119 122 87       Results from last 7 days  Lab Units 18  0606   HEMOGLOBIN A1C % 7 9*         * I Have Reviewed All Lab Data Listed Above  * Additional Pertinent Lab Tests Reviewed:  All Labs Within Last 24 Hours Reviewed    Imaging:    Imaging Reports Reviewed Today Include:   Imaging Personally Reviewed by Myself Includes:    Recent Cultures (last 7 days):       Results from last 7 days  Lab Units 07/26/18 1812   BLOOD CULTURE  No Growth at 72 hrs  No Growth at 72 hrs  Last 24 Hours Medication List:     Current Facility-Administered Medications:  acetaminophen 650 mg Oral Q6H PRN Lillie El, MD    aluminum-magnesium hydroxide-simethicone 15 mL Oral Q6H PRN Lillie El MD    atorvastatin 80 mg Oral Daily With Sanjuanita Aguilera MD    bromfenac sodium 1 drop Right Eye Daily Lillie El, MD    cefazolin 2,000 mg Intravenous Q8H Angeles Polanco MD Last Rate: 2,000 mg (07/30/18 1705)   clopidogrel 75 mg Oral Daily Lillie El, MD    docusate sodium 100 mg Oral BID Lillie El, MD    enoxaparin 40 mg Subcutaneous Daily Lillie El, MD    ferrous sulfate 325 mg Oral BID With Meals Lillie El, MD    fludrocortisone 0 1 mg Oral Daily Lillie El, MD    furosemide 20 mg Oral Daily Lillie El, MD    hydrALAZINE 5 mg Intravenous Q6H PRN Blaze Hercules PA-C    insulin glargine 50 Units Subcutaneous Q12H [facility] Catalina May MD    insulin lispro 1-5 Units Subcutaneous HS Lillie El MD    insulin lispro 2-12 Units Subcutaneous TID AC Lillie El, MD    metoclopramide 5 mg Oral Daily Lillie El, MD    metroNIDAZOLE 500 mg Intravenous Q8H Lillie El MD Last Rate: 500 mg (07/30/18 1706)   midodrine 5 mg Oral TID With Meals Lillie El, MD    polyvinyl alcohol 1 drop Right Eye TID Parminder Roland PA-C    sodium chloride (PF) 3 mL Intravenous PRN Sara Peralta DO    tamsulosin 0 4 mg Oral Daily With Sanjuanita Aguilera MD         Today, Patient Was Seen By: Catalina May MD    ** Please Note: Dictation voice to text software may have been used in the creation of this document   **

## 2018-07-30 NOTE — RESTORATIVE TECHNICIAN NOTE
Restorative Specialist Mobility Note       Activity: Ambulate in guardado, Ambulate in room, Bathroom privileges, Dangle, Stand at bedside (Educated/encouraged pt to ambulate with assistance 3-4 x's/day  Bed alarm on   Pt callbell, phone/tray within reach )     Assistive Device: Front wheel walker (L Darco Wedge shoe/R Surgical shoe on)       Anushka MALAVE, Restorative Technician, [employer]

## 2018-07-31 LAB
BACTERIA BLD CULT: NORMAL
BACTERIA BLD CULT: NORMAL
GLUCOSE SERPL-MCNC: 129 MG/DL (ref 65–140)
GLUCOSE SERPL-MCNC: 138 MG/DL (ref 65–140)
GLUCOSE SERPL-MCNC: 170 MG/DL (ref 65–140)
GLUCOSE SERPL-MCNC: 79 MG/DL (ref 65–140)

## 2018-07-31 PROCEDURE — 82948 REAGENT STRIP/BLOOD GLUCOSE: CPT

## 2018-07-31 PROCEDURE — 99232 SBSQ HOSP IP/OBS MODERATE 35: CPT | Performed by: INTERNAL MEDICINE

## 2018-07-31 PROCEDURE — 99232 SBSQ HOSP IP/OBS MODERATE 35: CPT | Performed by: SURGERY

## 2018-07-31 PROCEDURE — 99232 SBSQ HOSP IP/OBS MODERATE 35: CPT | Performed by: HOSPITALIST

## 2018-07-31 RX ORDER — BISACODYL 10 MG
10 SUPPOSITORY, RECTAL RECTAL DAILY PRN
Status: DISCONTINUED | OUTPATIENT
Start: 2018-07-31 | End: 2018-08-09 | Stop reason: HOSPADM

## 2018-07-31 RX ORDER — POLYETHYLENE GLYCOL 3350 17 G/17G
17 POWDER, FOR SOLUTION ORAL DAILY
Status: DISCONTINUED | OUTPATIENT
Start: 2018-07-31 | End: 2018-08-09 | Stop reason: HOSPADM

## 2018-07-31 RX ORDER — METRONIDAZOLE 500 MG/1
500 TABLET ORAL EVERY 8 HOURS SCHEDULED
Status: COMPLETED | OUTPATIENT
Start: 2018-07-31 | End: 2018-08-03

## 2018-07-31 RX ADMIN — POLYVINYL ALCOHOL 1 DROP: 14 SOLUTION/ DROPS OPHTHALMIC at 08:23

## 2018-07-31 RX ADMIN — CEFAZOLIN SODIUM 2000 MG: 2 SOLUTION INTRAVENOUS at 08:24

## 2018-07-31 RX ADMIN — MIDODRINE HYDROCHLORIDE 5 MG: 5 TABLET ORAL at 17:19

## 2018-07-31 RX ADMIN — TAMSULOSIN HYDROCHLORIDE 0.4 MG: 0.4 CAPSULE ORAL at 17:19

## 2018-07-31 RX ADMIN — Medication 325 MG: at 17:19

## 2018-07-31 RX ADMIN — POLYVINYL ALCOHOL 1 DROP: 14 SOLUTION/ DROPS OPHTHALMIC at 21:19

## 2018-07-31 RX ADMIN — INSULIN GLARGINE 40 UNITS: 100 INJECTION, SOLUTION SUBCUTANEOUS at 08:24

## 2018-07-31 RX ADMIN — INSULIN GLARGINE 40 UNITS: 100 INJECTION, SOLUTION SUBCUTANEOUS at 21:25

## 2018-07-31 RX ADMIN — POLYETHYLENE GLYCOL 3350 17 G: 17 POWDER, FOR SOLUTION ORAL at 13:05

## 2018-07-31 RX ADMIN — METRONIDAZOLE 500 MG: 500 TABLET ORAL at 21:25

## 2018-07-31 RX ADMIN — DOCUSATE SODIUM 100 MG: 100 CAPSULE, LIQUID FILLED ORAL at 08:23

## 2018-07-31 RX ADMIN — CEFAZOLIN SODIUM 2000 MG: 2 SOLUTION INTRAVENOUS at 17:19

## 2018-07-31 RX ADMIN — MIDODRINE HYDROCHLORIDE 5 MG: 5 TABLET ORAL at 11:12

## 2018-07-31 RX ADMIN — METRONIDAZOLE 500 MG: 500 TABLET ORAL at 13:05

## 2018-07-31 RX ADMIN — FLUDROCORTISONE ACETATE 0.1 MG: 0.1 TABLET ORAL at 08:23

## 2018-07-31 RX ADMIN — MIDODRINE HYDROCHLORIDE 5 MG: 5 TABLET ORAL at 08:23

## 2018-07-31 RX ADMIN — POLYVINYL ALCOHOL 1 DROP: 14 SOLUTION/ DROPS OPHTHALMIC at 17:19

## 2018-07-31 RX ADMIN — CLOPIDOGREL BISULFATE 75 MG: 75 TABLET, FILM COATED ORAL at 08:23

## 2018-07-31 RX ADMIN — INSULIN LISPRO 2 UNITS: 100 INJECTION, SOLUTION INTRAVENOUS; SUBCUTANEOUS at 17:22

## 2018-07-31 RX ADMIN — CEFAZOLIN SODIUM 2000 MG: 2 SOLUTION INTRAVENOUS at 00:47

## 2018-07-31 RX ADMIN — DOCUSATE SODIUM 100 MG: 100 CAPSULE, LIQUID FILLED ORAL at 17:19

## 2018-07-31 RX ADMIN — ATORVASTATIN CALCIUM 80 MG: 80 TABLET, FILM COATED ORAL at 17:19

## 2018-07-31 RX ADMIN — ENOXAPARIN SODIUM 40 MG: 40 INJECTION SUBCUTANEOUS at 08:23

## 2018-07-31 RX ADMIN — METRONIDAZOLE 500 MG: 500 INJECTION, SOLUTION INTRAVENOUS at 08:24

## 2018-07-31 RX ADMIN — METRONIDAZOLE 500 MG: 500 INJECTION, SOLUTION INTRAVENOUS at 00:02

## 2018-07-31 RX ADMIN — FUROSEMIDE 20 MG: 20 TABLET ORAL at 08:23

## 2018-07-31 RX ADMIN — METOCLOPRAMIDE HYDROCHLORIDE 5 MG: 10 TABLET ORAL at 08:23

## 2018-07-31 RX ADMIN — Medication 325 MG: at 08:24

## 2018-07-31 NOTE — ASSESSMENT & PLAN NOTE
Lab Results   Component Value Date    HGBA1C 7 9 (H) 07/27/2018    Hemoglobin A1c for tomorrow  Check blood sugars before meals and bedtime  Insulin sliding scale  Recent Labs      07/30/18   2113  07/31/18   0706  07/31/18   1058  07/31/18   1602   POCGLU  155*  79  129  170*       Blood Sugar Average: Last 72 hrs:  (P) 125 875     Continue insulin, monitor Accu-Cheks  Reduce Lantus to 40 units b i d    Avoid hypokalemia

## 2018-07-31 NOTE — PROGRESS NOTES
Progress Note - Infectious Disease   Rasheed Arm 60 y o  male MRN: [id number]  Unit/Bed#: [facility] 724-01 Encounter: [id number]      Impression/Recommendations:  1   Left heel chronic diabetic ulcer with underlying osteomyelitis/superinfection  Consider GPC/GNRs/anaerobes  Foot deemed nonsalvageable per podiatry  Blood cultures negative  Clinically stable without sepsis  Rec:  ? Continue cefazolin/Flagyl for now  ? Await BKA this week which should offer definitive cure  ? Anticipate discontinuing antibiotics postoperatively  ? Follow temperatures and WBC closely  ? Supportive care as per the primary service     2  DM with DPN  A1C 7 9  Rec:  ? Continue management as per the primary service     3   PAD             Status post recent Agram/TPA  Rec:  ? BKA this week  ? Close vascular surgery follow-up ongoing     The patient is stable from an ID standpoint     Antibiotics:  Cefazolin #3  Flagyl #6  Antibiotics #6    Subjective:  Patient seen on AM rounds  Denies fevers, chills, sweats, nausea, vomiting, or diarrhea  Constipated    24 Hour Events:  No documented fevers, chills, sweats, nausea, vomiting, or diarrhea  Objective:  Vitals:  HR:  [75-86] 75  Resp:  [18] 18  BP: (129-170)/(65-78) 170/78  SpO2:  [96 %-98 %] 98 %  Temp (24hrs), Av 2 °F (36 8 °C), Min:97 8 °F (36 6 °C), Max:98 8 °F (37 1 °C)  Current: Temperature: 98 8 °F (37 1 °C)    Physical Exam:   General:  No acute distress  Psychiatric:  Awake and alert  Pulmonary:  Normal respiratory excursion without accessory muscle use  Abdomen:  Soft, nontender  Extremities:  Non pitting edema left leg with ACE wrap intact  Skin:  No rashes    Lab Results:  I have personally reviewed pertinent labs      Results from last 7 days  Lab Units 18  0606 18  1812   SODIUM mmol/L 138 136   POTASSIUM mmol/L 3 4* 3 6   CHLORIDE mmol/L 105 102   CO2 mmol/L 26 29   ANION GAP mmol/L 7 5   BUN mg/dL 11 12   CREATININE mg/dL 0 73 0 93   EGFR ml/min/1 73sq m 102 90 GLUCOSE RANDOM mg/dL 75 105   CALCIUM mg/dL 8 3 8 7   AST U/L  --  15   ALT U/L  --  14   ALK PHOS U/L  --  69   TOTAL PROTEIN g/dL  --  8 0   BILIRUBIN TOTAL mg/dL  --  0 27       Results from last 7 days  Lab Units 07/27/18  0606 07/26/18  1812   WBC Thousand/uL 9 41 10 71*   HEMOGLOBIN g/dL 10 3* 11 5*   PLATELETS Thousands/uL 283 323       Results from last 7 days  Lab Units 07/26/18  1812   BLOOD CULTURE  No Growth After 4 Days  No Growth After 4 Days  Imaging Studies:   I have personally reviewed pertinent imaging study reports and images in PACS  EKG, Pathology, and Other Studies:   I have personally reviewed pertinent reports

## 2018-07-31 NOTE — ASSESSMENT & PLAN NOTE
BCx NGTD after 4 days  BKA per vascular surgery  IV antibiotics per Infectious Disease cefazolin/flagyl

## 2018-07-31 NOTE — PROGRESS NOTES
Progress Note - Jada Wagoner 1960, 60 y o  male MRN: [id number]    [facility]: [id number]    Primary Care Provider: Nicolás Garcia DO   Date and time admitted to hospital: 7/26/2018  5:03 PM        Obstructive sleep apnea   Assessment & Plan    CPAP noncompliant        Diabetic ulcer of left heel associated with type 2 diabetes mellitus, limited to breakdown of skin [facility])   Assessment & Plan    Lab Results   Component Value Date    HGBA1C 7 9 (H) 07/27/2018    Hemoglobin A1c for tomorrow  Check blood sugars before meals and bedtime  Insulin sliding scale  Recent Labs      07/30/18   2113  07/31/18   0706  07/31/18   1058  07/31/18   1602   POCGLU  155*  79  129  170*       Blood Sugar Average: Last 72 hrs:  (P) 125 875     Continue insulin, monitor Accu-Cheks  Reduce Lantus to 40 units b i d  Avoid hypokalemia         Triple vessel coronary artery disease   Assessment & Plan    Continue Plavix statin        Bilateral lower extremity edema   Assessment & Plan    Bilateral lower extremity edema seems to be stable at this point  PAD (peripheral artery disease) ([facility])   Assessment & Plan    On statin Plavix        * Non-healing wound of left heel   Assessment & Plan    BCx NGTD after 4 days  BKA per vascular surgery  IV antibiotics per Infectious Disease cefazolin/flagyl                   VTE Pharmacologic Prophylaxis:   Pharmacologic: Enoxaparin (Lovenox)  Mechanical VTE Prophylaxis in Place: no    Patient Centered Rounds: I have performed bedside rounds with nursing staff today  Discussions with Specialists or Other Care Team Provider: yes    Education and Discussions with Family / Patient:yes    Time Spent for Care: 45 minutes  More than 50% of total time spent on counseling and coordination of care as described above      Current Length of Stay: 5 day(s)    Current Patient Status: Inpatient   Certification Statement: The patient will continue to require additional inpatient hospital stay due to med mgt awaiting BKA of left leg    Discharge Plan: home when medically stable  Code Status: Level 1 - Full Code      Subjective:   No complaints today  ROS negative otherwise  Objective:     Vitals:   Temp (24hrs), Av 3 °F (36 8 °C), Min:97 9 °F (36 6 °C), Max:98 8 °F (37 1 °C)    HR:  [75-87] 87  Resp:  [18] 18  BP: (129-170)/(65-81) 157/81  SpO2:  [97 %-98 %] 97 %  Body mass index is 30 45 kg/m²  Input and Output Summary (last 24 hours): Intake/Output Summary (Last 24 hours) at 18 175  Last data filed at 18 1747   Gross per 24 hour   Intake             2250 ml   Output             2300 ml   Net              -50 ml       Physical Exam:     Physical Exam   Constitutional: He is oriented to person, place, and time  No distress  HENT:   Head: Normocephalic and atraumatic  Mouth/Throat: Oropharynx is clear and moist    Eyes: Conjunctivae and EOM are normal  Pupils are equal, round, and reactive to light  Neck: Normal range of motion  Neck supple  No JVD present  Cardiovascular: Normal rate and normal heart sounds  No murmur heard  Pulmonary/Chest: Effort normal and breath sounds normal  No respiratory distress  He has no wheezes  He has no rales  Abdominal: Soft  Bowel sounds are normal  He exhibits no distension  There is no tenderness  Musculoskeletal: Normal range of motion  Left leg in dressing   Neurological: He is alert and oriented to person, place, and time  No cranial nerve deficit  Coordination normal    Skin: Skin is warm  No erythema  Psychiatric: He has a normal mood and affect           Additional Data:     Labs:      Results from last 7 days  Lab Units 18  0606   WBC Thousand/uL 9 41   HEMOGLOBIN g/dL 10 3*   HEMATOCRIT % 33 3*   PLATELETS Thousands/uL 283   NEUTROS PCT % 71   LYMPHS PCT % 17   MONOS PCT % 8   EOS PCT % 3       Results from last 7 days  Lab Units 18  0606 18  1812   SODIUM mmol/L 138 136   POTASSIUM mmol/L 3 4* 3 6   CHLORIDE mmol/L 105 102   CO2 mmol/L 26 29   BUN mg/dL 11 12   CREATININE mg/dL 0 73 0 93   CALCIUM mg/dL 8 3 8 7   TOTAL PROTEIN g/dL  --  8 0   BILIRUBIN TOTAL mg/dL  --  0 27   ALK PHOS U/L  --  69   ALT U/L  --  14   AST U/L  --  15   GLUCOSE RANDOM mg/dL 75 105       Results from last 7 days  Lab Units 07/26/18  1812   INR  1 05       Results from last 7 days  Lab Units 07/31/18  1602 07/31/18  1058 07/31/18  0706 07/30/18  2113 07/30/18  1635 07/30/18  1121 07/30/18  0713 07/29/18  2117 07/29/18  1622 07/29/18  1051 07/29/18  0736 07/29/18  0652   POC GLUCOSE mg/dl 170* 129 79 155* 170* 130 75 175* 121 163* 82 66       Results from last 7 days  Lab Units 07/27/18  0606   HEMOGLOBIN A1C % 7 9*         * I Have Reviewed All Lab Data Listed Above  * Additional Pertinent Lab Tests Reviewed: [address] Admission Reviewed    Imaging:    Imaging Reports Reviewed Today Recent Cultures (last 7 days):       Results from last 7 days  Lab Units 07/26/18  1812   BLOOD CULTURE  No Growth After 4 Days  No Growth After 4 Days         Last 24 Hours Medication List:     Current Facility-Administered Medications:  acetaminophen 650 mg Oral Q6H PRN Cullen Todd MD    aluminum-magnesium hydroxide-simethicone 15 mL Oral Q6H PRN Cullen Todd MD    atorvastatin 80 mg Oral Daily With Rivka Landis MD    bisacodyl 10 mg Rectal Daily PRN Mani Garcia MD    bromfenac sodium 1 drop Right Eye Daily Cullen Todd MD    cefazolin 2,000 mg Intravenous Q8H Mani Garcia MD Last Rate: 2,000 mg (07/31/18 1719)   clopidogrel 75 mg Oral Daily Cullen Todd MD    docusate sodium 100 mg Oral BID Cullen Todd MD    enoxaparin 40 mg Subcutaneous Daily Cullen Todd MD    ferrous sulfate 325 mg Oral BID With Meals Cullen Todd MD    fludrocortisone 0 1 mg Oral Daily Cullen Todd MD    furosemide 20 mg Oral Daily Cullen Todd MD    hydrALAZINE 5 mg Intravenous Q6H PRN Patito Dawson PA-C    insulin glargine 40 Units Subcutaneous Q12H [address] Angie Serrato MD    insulin lispro 1-5 Units Subcutaneous HS [place] Varner MD    insulin lispro 2-12 Units Subcutaneous TID AC [place] Varner MD    metoclopramide 5 mg Oral Daily Tacos Varner MD    metroNIDAZOLE 500 mg Oral [facility] Malathi Tay MD    midodrine 5 mg Oral TID With Meals [place]os Varner MD    polyethylene glycol 17 g Oral Daily Malathi Tay MD    polyvinyl alcohol 1 drop Right Eye TID Eliane Jimenez PA-C    sodium chloride (PF) 3 mL Intravenous PRN Riccardopal Henderson DO    tamsulosin 0 4 mg Oral Daily With Grace Rodriguez MD         Today, Patient Was Seen By: Jennifer Pardo MD    ** Please Note: Dictation voice to text software may have been used in the creation of this document   **

## 2018-07-31 NOTE — CASE MANAGEMENT
Continued Stay Review    Date: 7/31/18    Vital Signs: /78 (BP Location: Left arm)   Pulse 75   Temp 98 8 °F (37 1 °C) (Oral)   Resp 18   Ht 6' 1" (1 854 m)   Wt 105 kg (230 lb 13 2 oz)   SpO2 98%   BMI 30 45 kg/m²     Medications:   Scheduled Meds:   Current Facility-Administered Medications:  acetaminophen 650 mg Oral Q6H PRN    aluminum-magnesium hydroxide-simethicone 15 mL Oral Q6H PRN    atorvastatin 80 mg Oral Daily With Dinner    bisacodyl 10 mg Rectal Daily PRN    bromfenac sodium 1 drop Right Eye Daily    cefazolin 2,000 mg Intravenous Q8H Last Rate: 2,000 mg (07/31/18 0824)   clopidogrel 75 mg Oral Daily    docusate sodium 100 mg Oral BID    enoxaparin 40 mg Subcutaneous Daily    ferrous sulfate 325 mg Oral BID With Meals    fludrocortisone 0 1 mg Oral Daily    furosemide 20 mg Oral Daily    hydrALAZINE 5 mg Intravenous Q6H PRN    insulin glargine 40 Units Subcutaneous Q12H [place]    insulin lispro 1-5 Units Subcutaneous HS    insulin lispro 2-12 Units Subcutaneous TID AC    metoclopramide 5 mg Oral Daily    metroNIDAZOLE 500 mg Oral Q8H [place]    midodrine 5 mg Oral TID With Meals    polyethylene glycol 17 g Oral Daily    polyvinyl alcohol 1 drop Right Eye TID    sodium chloride (PF) 3 mL Intravenous PRN    tamsulosin 0 4 mg Oral Daily With Dinner      PRN Meds:   acetaminophen    aluminum-magnesium hydroxide-simethicone    bisacodyl    hydrALAZINE    Insert peripheral IV **AND** sodium chloride (PF)    Age/Sex: 60 y o  male     Assessment/Plan:   7/31 Vascular Surgery Progress Note  Assessment:  60 M w/ non healing/ non salvageable L heel wound and OM     Plan:  - L BKA on Thursday 8/2  _________________________  7/31 ID Progress Note  Cont IV antibiotics pre op     Discharge Plan: will need rehab post op  Thank youDona  291 Utilization Review Department  Phone: [phone];  Fax [phone]  ATTENTION: The Network Utilization Review Department is now centralized for our 9 Facilities  Make a note that we have a new phone and fax numbers for our Department  Please call with any questions or concerns to [phone] and carefully follow the prompts so that you are directed to the right person  All voicemails are confidential  Fax any determinations, approvals, denials, and requests for initial or continue stay review clinical to [phone]  Due to HIGH CALL volume, it would be easier if you could please send faxed requests to expedite your requests and in part, help us provide discharge notifications faster

## 2018-07-31 NOTE — PROGRESS NOTES
Progress Note - Vascular Surgery   Gayatri Henderson 60 y o  male MRN: [id number]  Unit/Bed#: [facility] 724-01 Encounter: [id number]    Assessment:  60 M w/ non healing/ non salvageable L heel wound and OM    Plan:  - L BKA on Thursday 8/2  - Continue care per primary    Subjective/Objective   Chief Complaint:     Subjective: Patient resting in bed comfortably  No acute events overnight  Objective:     Blood pressure 129/65, pulse 86, temperature 97 9 °F (36 6 °C), temperature source Oral, resp  rate 18, height 6' 1" (1 854 m), weight 115 kg (253 lb 8 5 oz), SpO2 98 %  ,Body mass index is 33 45 kg/m²  Intake/Output Summary (Last 24 hours) at 07/31/18 0423  Last data filed at 07/31/18 0101   Gross per 24 hour   Intake              870 ml   Output             2095 ml   Net            -1225 ml       Invasive Devices     Peripheral Intravenous Line            Peripheral IV 07/30/18 Right Hand less than 1 day                Physical Exam: General: AAOx3  Respiratory: BS b/l  Abdomen: Soft, NT, no rebound/guarding  Heart: RRR, S1s2  Ext: Warm no cyanosis     Lab, Imaging and other studies:I have personally reviewed pertinent lab results    , CBC: No results found for: WBC, HGB, HCT, MCV, PLT, ADJUSTEDWBC, MCH, MCHC, RDW, MPV, NRBC, CMP: No results found for: NA, K, CL, CO2, ANIONGAP, BUN, CREATININE, GLUCOSE, CALCIUM, AST, ALT, ALKPHOS, PROT, ALBUMIN, BILITOT, EGFR  VTE Pharmacologic Prophylaxis: Enoxaparin (Lovenox)  VTE Mechanical Prophylaxis: sequential compression device

## 2018-08-01 PROBLEM — R60.0 BILATERAL LOWER EXTREMITY EDEMA: Chronic | Status: RESOLVED | Noted: 2018-03-19 | Resolved: 2018-08-01

## 2018-08-01 LAB
ABO GROUP BLD: NORMAL
ALBUMIN SERPL BCP-MCNC: 2.4 G/DL (ref 3.5–5)
ALP SERPL-CCNC: 50 U/L (ref 46–116)
ALT SERPL W P-5'-P-CCNC: 13 U/L (ref 12–78)
ANION GAP SERPL CALCULATED.3IONS-SCNC: 7 MMOL/L (ref 4–13)
AST SERPL W P-5'-P-CCNC: 18 U/L (ref 5–45)
BASOPHILS # BLD AUTO: 0.04 THOUSANDS/ΜL (ref 0–0.1)
BASOPHILS NFR BLD AUTO: 1 % (ref 0–1)
BILIRUB SERPL-MCNC: 0.28 MG/DL (ref 0.2–1)
BLD GP AB SCN SERPL QL: NEGATIVE
BUN SERPL-MCNC: 10 MG/DL (ref 5–25)
CALCIUM SERPL-MCNC: 8.5 MG/DL (ref 8.3–10.1)
CHLORIDE SERPL-SCNC: 104 MMOL/L (ref 100–108)
CO2 SERPL-SCNC: 28 MMOL/L (ref 21–32)
CREAT SERPL-MCNC: 0.7 MG/DL (ref 0.6–1.3)
EOSINOPHIL # BLD AUTO: 0.28 THOUSAND/ΜL (ref 0–0.61)
EOSINOPHIL NFR BLD AUTO: 3 % (ref 0–6)
ERYTHROCYTE [DISTWIDTH] IN BLOOD BY AUTOMATED COUNT: 15.5 % (ref 11.6–15.1)
GFR SERPL CREATININE-BSD FRML MDRD: 103 ML/MIN/1.73SQ M
GLUCOSE SERPL-MCNC: 136 MG/DL (ref 65–140)
GLUCOSE SERPL-MCNC: 160 MG/DL (ref 65–140)
GLUCOSE SERPL-MCNC: 200 MG/DL (ref 65–140)
GLUCOSE SERPL-MCNC: 76 MG/DL (ref 65–140)
GLUCOSE SERPL-MCNC: 76 MG/DL (ref 65–140)
GLUCOSE SERPL-MCNC: 82 MG/DL (ref 65–140)
HCT VFR BLD AUTO: 35.6 % (ref 36.5–49.3)
HGB BLD-MCNC: 10.9 G/DL (ref 12–17)
IMM GRANULOCYTES # BLD AUTO: 0.02 THOUSAND/UL (ref 0–0.2)
IMM GRANULOCYTES NFR BLD AUTO: 0 % (ref 0–2)
LYMPHOCYTES # BLD AUTO: 1.77 THOUSANDS/ΜL (ref 0.6–4.47)
LYMPHOCYTES NFR BLD AUTO: 20 % (ref 14–44)
MCH RBC QN AUTO: 24.8 PG (ref 26.8–34.3)
MCHC RBC AUTO-ENTMCNC: 30.6 G/DL (ref 31.4–37.4)
MCV RBC AUTO: 81 FL (ref 82–98)
MONOCYTES # BLD AUTO: 0.6 THOUSAND/ΜL (ref 0.17–1.22)
MONOCYTES NFR BLD AUTO: 7 % (ref 4–12)
NEUTROPHILS # BLD AUTO: 6.1 THOUSANDS/ΜL (ref 1.85–7.62)
NEUTS SEG NFR BLD AUTO: 69 % (ref 43–75)
NRBC BLD AUTO-RTO: 0 /100 WBCS
PLATELET # BLD AUTO: 308 THOUSANDS/UL (ref 149–390)
PMV BLD AUTO: 9.6 FL (ref 8.9–12.7)
POTASSIUM SERPL-SCNC: 3.8 MMOL/L (ref 3.5–5.3)
PROT SERPL-MCNC: 6.9 G/DL (ref 6.4–8.2)
RBC # BLD AUTO: 4.39 MILLION/UL (ref 3.88–5.62)
RH BLD: POSITIVE
SODIUM SERPL-SCNC: 139 MMOL/L (ref 136–145)
SPECIMEN EXPIRATION DATE: NORMAL
WBC # BLD AUTO: 8.81 THOUSAND/UL (ref 4.31–10.16)

## 2018-08-01 PROCEDURE — 85025 COMPLETE CBC W/AUTO DIFF WBC: CPT | Performed by: HOSPITALIST

## 2018-08-01 PROCEDURE — 86923 COMPATIBILITY TEST ELECTRIC: CPT

## 2018-08-01 PROCEDURE — 86900 BLOOD TYPING SEROLOGIC ABO: CPT | Performed by: PHYSICIAN ASSISTANT

## 2018-08-01 PROCEDURE — 80053 COMPREHEN METABOLIC PANEL: CPT | Performed by: HOSPITALIST

## 2018-08-01 PROCEDURE — 82948 REAGENT STRIP/BLOOD GLUCOSE: CPT

## 2018-08-01 PROCEDURE — 86850 RBC ANTIBODY SCREEN: CPT | Performed by: PHYSICIAN ASSISTANT

## 2018-08-01 PROCEDURE — 86901 BLOOD TYPING SEROLOGIC RH(D): CPT | Performed by: PHYSICIAN ASSISTANT

## 2018-08-01 PROCEDURE — 99232 SBSQ HOSP IP/OBS MODERATE 35: CPT | Performed by: INTERNAL MEDICINE

## 2018-08-01 PROCEDURE — 99232 SBSQ HOSP IP/OBS MODERATE 35: CPT | Performed by: HOSPITALIST

## 2018-08-01 RX ORDER — SODIUM PHOSPHATE, DIBASIC AND SODIUM PHOSPHATE, MONOBASIC 7; 19 G/133ML; G/133ML
1 ENEMA RECTAL ONCE AS NEEDED
Status: COMPLETED | OUTPATIENT
Start: 2018-08-01 | End: 2018-08-01

## 2018-08-01 RX ORDER — BISACODYL 10 MG
10 SUPPOSITORY, RECTAL RECTAL ONCE
Status: DISCONTINUED | OUTPATIENT
Start: 2018-08-01 | End: 2018-08-08

## 2018-08-01 RX ORDER — MAGNESIUM CARB/ALUMINUM HYDROX 105-160MG
30 TABLET,CHEWABLE ORAL ONCE
Status: DISCONTINUED | OUTPATIENT
Start: 2018-08-01 | End: 2018-08-07

## 2018-08-01 RX ORDER — SENNOSIDES 8.6 MG
1 TABLET ORAL
Status: DISCONTINUED | OUTPATIENT
Start: 2018-08-01 | End: 2018-08-09 | Stop reason: HOSPADM

## 2018-08-01 RX ADMIN — POLYETHYLENE GLYCOL 3350 17 G: 17 POWDER, FOR SOLUTION ORAL at 10:19

## 2018-08-01 RX ADMIN — Medication 325 MG: at 08:28

## 2018-08-01 RX ADMIN — Medication 325 MG: at 15:57

## 2018-08-01 RX ADMIN — TAMSULOSIN HYDROCHLORIDE 0.4 MG: 0.4 CAPSULE ORAL at 15:57

## 2018-08-01 RX ADMIN — CEFAZOLIN SODIUM 2000 MG: 2 SOLUTION INTRAVENOUS at 01:28

## 2018-08-01 RX ADMIN — POLYVINYL ALCOHOL 1 DROP: 14 SOLUTION/ DROPS OPHTHALMIC at 15:57

## 2018-08-01 RX ADMIN — ENOXAPARIN SODIUM 40 MG: 40 INJECTION SUBCUTANEOUS at 10:18

## 2018-08-01 RX ADMIN — CEFAZOLIN SODIUM 2000 MG: 2 SOLUTION INTRAVENOUS at 15:57

## 2018-08-01 RX ADMIN — METRONIDAZOLE 500 MG: 500 TABLET ORAL at 21:14

## 2018-08-01 RX ADMIN — CEFAZOLIN SODIUM 2000 MG: 2 SOLUTION INTRAVENOUS at 10:19

## 2018-08-01 RX ADMIN — MIDODRINE HYDROCHLORIDE 5 MG: 5 TABLET ORAL at 12:35

## 2018-08-01 RX ADMIN — METRONIDAZOLE 500 MG: 500 TABLET ORAL at 05:56

## 2018-08-01 RX ADMIN — INSULIN LISPRO 4 UNITS: 100 INJECTION, SOLUTION INTRAVENOUS; SUBCUTANEOUS at 12:30

## 2018-08-01 RX ADMIN — CLOPIDOGREL BISULFATE 75 MG: 75 TABLET, FILM COATED ORAL at 10:19

## 2018-08-01 RX ADMIN — MIDODRINE HYDROCHLORIDE 5 MG: 5 TABLET ORAL at 08:28

## 2018-08-01 RX ADMIN — POLYVINYL ALCOHOL 1 DROP: 14 SOLUTION/ DROPS OPHTHALMIC at 10:22

## 2018-08-01 RX ADMIN — FLUDROCORTISONE ACETATE 0.1 MG: 0.1 TABLET ORAL at 10:20

## 2018-08-01 RX ADMIN — INSULIN LISPRO 2 UNITS: 100 INJECTION, SOLUTION INTRAVENOUS; SUBCUTANEOUS at 17:54

## 2018-08-01 RX ADMIN — DOCUSATE SODIUM 100 MG: 100 CAPSULE, LIQUID FILLED ORAL at 10:18

## 2018-08-01 RX ADMIN — METRONIDAZOLE 500 MG: 500 TABLET ORAL at 13:39

## 2018-08-01 RX ADMIN — ATORVASTATIN CALCIUM 80 MG: 80 TABLET, FILM COATED ORAL at 15:57

## 2018-08-01 RX ADMIN — INSULIN GLARGINE 40 UNITS: 100 INJECTION, SOLUTION SUBCUTANEOUS at 10:27

## 2018-08-01 RX ADMIN — SODIUM PHOSPHATE, DIBASIC AND SODIUM PHOSPHATE, MONOBASIC 1 ENEMA: 7; 19 ENEMA RECTAL at 13:39

## 2018-08-01 RX ADMIN — FUROSEMIDE 20 MG: 20 TABLET ORAL at 10:19

## 2018-08-01 RX ADMIN — METOCLOPRAMIDE HYDROCHLORIDE 5 MG: 10 TABLET ORAL at 10:19

## 2018-08-01 RX ADMIN — DOCUSATE SODIUM 100 MG: 100 CAPSULE, LIQUID FILLED ORAL at 15:57

## 2018-08-01 RX ADMIN — INSULIN GLARGINE 20 UNITS: 100 INJECTION, SOLUTION SUBCUTANEOUS at 21:31

## 2018-08-01 RX ADMIN — SENNOSIDES 8.6 MG: 8.6 TABLET, FILM COATED ORAL at 21:14

## 2018-08-01 RX ADMIN — POLYVINYL ALCOHOL 1 DROP: 14 SOLUTION/ DROPS OPHTHALMIC at 21:14

## 2018-08-01 RX ADMIN — MIDODRINE HYDROCHLORIDE 5 MG: 5 TABLET ORAL at 15:57

## 2018-08-01 NOTE — RESTORATIVE TECHNICIAN NOTE
Restorative Specialist Mobility Note       Activity: Ambulate in guardado, Ambulate in room, Bathroom privileges, Chair, Dangle, Stand at bedside (Educated/encouraged pt to ambulate with assistance 3-4 x's/day   Pt callbell, phone/tray within reach )     Assistive Device: Front wheel walker (L Darco Wedge shoe/R surgical shoes on)       Siva MALAVE, Restorative Technician, [employer]

## 2018-08-01 NOTE — PROGRESS NOTES
Progress Note - [facility] 1960, 60 y o  male MRN: [id number]    [facility] Encounter: [id number]    Primary Care Provider: Ester Lowery DO   Date and time admitted to hospital: 7/26/2018  5:03 PM        Obstructive sleep apnea   Assessment & Plan    CPAP noncompliant        Diabetic ulcer of left heel associated with type 2 diabetes mellitus, limited to breakdown of skin [facility])   Assessment & Plan    Lab Results   Component Value Date    HGBA1C 7 9 (H) 07/27/2018    Hemoglobin A1c for tomorrow  Check blood sugars before meals and bedtime  Insulin sliding scale  Recent Labs      08/01/18   0653  08/01/18   0746  08/01/18   1027  08/01/18   1613   POCGLU  76  82  200*  160*       Blood Sugar Average: Last 72 hrs:  (P) 814 [id number]     Continue insulin, monitor Accu-Cheks   Lantus to 40 units b i d  Avoid hypokalemia         Other constipation   Assessment & Plan    Fleet enema  Stool softeners  He reportedly has autonomic dysfunction which affects his bowels  Triple vessel coronary artery disease   Assessment & Plan    Continue Plavix statin        PAD (peripheral artery disease) ([facility])   Assessment & Plan    On statin Plavix        Benign essential HTN   Assessment & Plan    /84  Continue monitoring  Continue meds        * Non-healing wound of left heel   Assessment & Plan    BCx NGTD after 4 days  BKA per vascular surgery- tentatively Friday as per VS  IV antibiotics per Infectious Disease cefazolin/flagyl               Bilateral lower extremity edemaresolved as of 8/1/2018   Assessment & Plan    Bilateral lower extremity edema seems to be stable at this point  VTE Pharmacologic Prophylaxis:   Pharmacologic: Enoxaparin (Lovenox)  Mechanical VTE Prophylaxis in Place: No    Patient Centered Rounds: I have performed bedside rounds with nursing staff today      Discussions with Specialists or Other Care Team Provider: yes    Education and Discussions with Family / Patient: yes    Time Spent for Care: 45 minutes  More than 50% of total time spent on counseling and coordination of care as described above  Current Length of Stay: 6 day(s)    Current Patient Status: Inpatient   Certification Statement: The patient will continue to require additional inpatient hospital stay due to med mgt of left foot ulcer    Discharge Plan: home when medically stable  Code Status: Level 1 - Full Code      Subjective:   Constipation  Neuropathic pain generalized on all extremities  ROS negative otherwise  Objective:     Vitals:   Temp (24hrs), Av 2 °F (36 8 °C), Min:97 9 °F (36 6 °C), Max:98 4 °F (36 9 °C)    HR:  [75-82] 82  Resp:  [18] 18  BP: (145-168)/(70-84) 164/84  SpO2:  [97 %-98 %] 97 %  Body mass index is 33 89 kg/m²  Input and Output Summary (last 24 hours): Intake/Output Summary (Last 24 hours) at 18 1620  Last data filed at 18 1229   Gross per 24 hour   Intake             1600 ml   Output             2300 ml   Net             -700 ml       Physical Exam:     Physical Exam   Constitutional: He is oriented to person, place, and time  No distress  HENT:   Head: Normocephalic and atraumatic  Mouth/Throat: Oropharynx is clear and moist  No oropharyngeal exudate  Eyes: Conjunctivae and EOM are normal  Pupils are equal, round, and reactive to light  No scleral icterus  Neck: Normal range of motion  Neck supple  No JVD present  Cardiovascular: Normal rate and normal heart sounds  Exam reveals no gallop and no friction rub  No murmur heard  Pulmonary/Chest: Effort normal and breath sounds normal  No respiratory distress  He has no wheezes  He has no rales  Abdominal: Soft  Bowel sounds are normal  He exhibits no distension  There is no tenderness  There is no rebound and no guarding  Musculoskeletal: Normal range of motion  He exhibits deformity  He exhibits no edema or tenderness  Neurological: He is oriented to person, place, and time  He has normal reflexes  He displays normal reflexes  No cranial nerve deficit  Coordination normal    Skin: Skin is warm  Psychiatric: He has a normal mood and affect  His behavior is normal          Additional Data:     Labs:      Results from last 7 days  Lab Units 08/01/18  0449   WBC Thousand/uL 8 81   HEMOGLOBIN g/dL 10 9*   HEMATOCRIT % 35 6*   PLATELETS Thousands/uL 308   NEUTROS PCT % 69   LYMPHS PCT % 20   MONOS PCT % 7   EOS PCT % 3       Results from last 7 days  Lab Units 08/01/18  0449   SODIUM mmol/L 139   POTASSIUM mmol/L 3 8   CHLORIDE mmol/L 104   CO2 mmol/L 28   BUN mg/dL 10   CREATININE mg/dL 0 70   CALCIUM mg/dL 8 5   TOTAL PROTEIN g/dL 6 9   BILIRUBIN TOTAL mg/dL 0 28   ALK PHOS U/L 50   ALT U/L 13   AST U/L 18   GLUCOSE RANDOM mg/dL 76       Results from last 7 days  Lab Units 07/26/18  1812   INR  1 05       Results from last 7 days  Lab Units 08/01/18  1613 08/01/18  1027 08/01/18  0746 08/01/18  0653 07/31/18  2105 07/31/18  1602 07/31/18  1058 07/31/18  0706 07/30/18  2113 07/30/18  1635 07/30/18  1121 07/30/18  0713   POC GLUCOSE mg/dl 160* 200* 82 76 138 170* 129 79 155* 170* 130 75       Results from last 7 days  Lab Units 07/27/18  0606   HEMOGLOBIN A1C % 7 9*         * I Have Reviewed All Lab Data Listed Above  * Additional Pertinent Lab Tests Reviewed: [place] [address] Admission Reviewed    Imaging:    Imaging Reports Reviewed Today  Recent Cultures (last 7 days):       Results from last 7 days  Lab Units 07/26/18  1812   BLOOD CULTURE  No Growth After 5 Days  No Growth After 5 Days         Last 24 Hours Medication List:     Current Facility-Administered Medications:  acetaminophen 650 mg Oral Q6H PRN Bear Au MD    aluminum-magnesium hydroxide-simethicone 15 mL Oral Q6H PRN Bear Au MD    atorvastatin 80 mg Oral Daily With Savannah Mooney MD    bisacodyl 10 mg Rectal Daily PRN Shmuel Arteaga MD    bisacodyl 10 mg Rectal Once [facility] NUNO Peck    bromfenac sodium 1 drop Right Eye Daily Jessica Cruz MD    cefazolin 2,000 mg Intravenous Q8H Lenka Escalera MD Last Rate: 2,000 mg (08/01/18 1557)   clopidogrel 75 mg Oral Daily Jessica Cruz MD    docusate sodium 100 mg Oral BID Jessica Cruz MD    enoxaparin 40 mg Subcutaneous Daily Jessica Cruz MD    ferrous sulfate 325 mg Oral BID With Meals Jessica Crzu MD    fludrocortisone 0 1 mg Oral Daily Jessica Cruz MD    furosemide 20 mg Oral Daily Jessica Cruz MD    hydrALAZINE 5 mg Intravenous Q6H PRN Nick Albrecht PA-C    insulin glargine 40 Units Subcutaneous Q12H [address] Fadi Diamond MD    insulin lispro 1-5 Units Subcutaneous HS Jessica Cruz MD    insulin lispro 2-12 Units Subcutaneous TID AC Jessica Cruz MD    metoclopramide 5 mg Oral Daily Jessica Cruz MD    metroNIDAZOLE 500 mg Oral [facility] Lenka Escalera MD    midodrine 5 mg Oral TID With Meals Jessica Cruz MD    mineral oil 30 mL Oral Once [place] PA-C    polyethylene glycol 17 g Oral Daily Lenka Escalera MD    polyvinyl alcohol 1 drop Right Eye TID Eucheryl Gee PA-C    senna 1 tablet Oral HS [place] PA-C    sodium chloride (PF) 3 mL Intravenous PRN Hernando Johnson DO    tamsulosin 0 4 mg Oral Daily With Humera Saini MD         Today, Patient Was Seen By: Alice Bravo MD    ** Please Note: Dictation voice to text software may have been used in the creation of this document   **

## 2018-08-01 NOTE — PROGRESS NOTES
Progress Note - Infectious Disease   Kingsley Parrish 60 y o  male MRN: [id number]  Unit/Bed#: [facility] 724-01 Encounter: [id number]      Impression/Recommendations:  1   Left heel chronic diabetic ulcer with underlying osteomyelitis/superinfection  Consider GPC/GNRs/anaerobes  Foot deemed nonsalvageable per podiatry  Blood cultures negative  Clinically stable without sepsis  Rec:  ? Continue cefazolin/Flagyl for now  ? Await BKA this week which should offer definitive cure  ? Anticipate discontinuing antibiotics postoperatively  ? Follow temperatures and WBC closely  ? Continue [facility] per nursing  ? Supportive care as per the primary service     2  DM with DPN  A1C 7 9  Rec:  ? Continue management as per the primary service     3   PAD             Status post recent Agram/TPA  Rec:  ? BKA this week  ? Close vascular surgery follow-up ongoing     The patient is stable from an ID standpoint     Antibiotics:  Cefazolin #4  Flagyl #7  Antibiotics #7    Subjective:  Patient seen on AM rounds  Denies fevers, chills, sweats, nausea, vomiting, or diarrhea  Still constipated    24 Hour Events:  No documented fevers, chills, sweats, nausea, vomiting, or diarrhea  Objective:  Vitals:  HR:  [75-87] 75  Resp:  [18] 18  BP: (145-168)/(70-81) 168/80  SpO2:  [97 %-98 %] 97 %  Temp (24hrs), Av 1 °F (36 7 °C), Min:97 9 °F (36 6 °C), Max:98 3 °F (36 8 °C)  Current: Temperature: 97 9 °F (36 6 °C)    Physical Exam:   General:  No acute distress  Psychiatric:  Awake and alert  Pulmonary:  Normal respiratory excursion without accessory muscle use  Abdomen:  Soft, nontender  Extremities:  Mild left lower extremity edema with Ace wrap intact  Skin:  No rashes    Lab Results:  I have personally reviewed pertinent labs      Results from last 7 days  Lab Units 18  0449 18  0606 18  1812   SODIUM mmol/L 139 138 136   POTASSIUM mmol/L 3 8 3 4* 3 6   CHLORIDE mmol/L 104 105 102   CO2 mmol/L 28 26 29   ANION GAP mmol/L 7 7 5   BUN mg/dL 10 11 12   CREATININE mg/dL 0 70 0 73 0 93   EGFR ml/min/1 73sq m 103 102 90   GLUCOSE RANDOM mg/dL 76 75 105   CALCIUM mg/dL 8 5 8 3 8 7   AST U/L 18  --  15   ALT U/L 13  --  14   ALK PHOS U/L 50  --  69   TOTAL PROTEIN g/dL 6 9  --  8 0   BILIRUBIN TOTAL mg/dL 0 28  --  0 27       Results from last 7 days  Lab Units 08/01/18  0449 07/27/18  0606 07/26/18  1812   WBC Thousand/uL 8 81 9 41 10 71*   HEMOGLOBIN g/dL 10 9* 10 3* 11 5*   PLATELETS Thousands/uL 308 283 323       Results from last 7 days  Lab Units 07/26/18  1812   BLOOD CULTURE  No Growth After 5 Days  No Growth After 5 Days  Imaging Studies:   I have personally reviewed pertinent imaging study reports and images in PACS      EKG, Pathology, and Other Studies:   I have personally reviewed pertinent reports                        ]

## 2018-08-01 NOTE — PROGRESS NOTES
Progress Note - Vascular Surgery   Yannick Flatten 60 y o  male MRN: [id number]  Unit/Bed#: [facility] 724-01 Encounter: [id number]    Assessment:  59 y/o M w/ PAD, p/w nonsalvagable L foot wound    Plan:  --Await re-scheduling of L BKA  --Continue care per primary team    Subjective/Objective     Subjective:     No acute events overnight  Pt denies any complaints  Asking when his L BKA will be re-scheduled  Objective:     Blood pressure 145/70, pulse 78, temperature 98 3 °F (36 8 °C), temperature source Oral, resp  rate 18, height 6' 1" (1 854 m), weight 105 kg (230 lb 13 2 oz), SpO2 98 %  ,Body mass index is 30 45 kg/m²  Intake/Output Summary (Last 24 hours) at 08/01/18 1531  Last data filed at 07/31/18 2116   Gross per 24 hour   Intake             1620 ml   Output             2750 ml   Net            -1130 ml       Invasive Devices     Peripheral Intravenous Line            Peripheral IV 07/31/18 Left Forearm less than 1 day                Physical Exam:     GEN: NAD  HEENT: MMM  CV: RRR  Lung: normal effort  Ab: Soft, NT/ND  Extrem: No CCE; LLE wrapped in ACE bandage  Neuro:  A+Ox3, motor and sensation grossly intact      Lab, Imaging and other studies:CBC: No results found for: WBC, HGB, HCT, MCV, PLT, ADJUSTEDWBC, MCH, MCHC, RDW, MPV, NRBC, CMP: No results found for: NA, K, CL, CO2, ANIONGAP, BUN, CREATININE, GLUCOSE, CALCIUM, AST, ALT, ALKPHOS, PROT, ALBUMIN, BILITOT, EGFR, Coagulation: No results found for: PT, INR, APTT, Urinalysis: No results found for: COLORU, CLARITYU, SPECGRAV, PHUR, LEUKOCYTESUR, NITRITE, PROTEINUA, GLUCOSEU, KETONESU, BILIRUBINUR, BLOODU, Amylase: No results found for: AMYLASE, Lipase: No results found for: LIPASE  VTE Pharmacologic Prophylaxis: Enoxaparin (Lovenox)  VTE Mechanical Prophylaxis: sequential compression device

## 2018-08-01 NOTE — ASSESSMENT & PLAN NOTE
BCx NGTD after 4 days  BKA per vascular surgery- tentatively Friday as per VS  IV antibiotics per Infectious Disease cefazolin/flagyl

## 2018-08-01 NOTE — ASSESSMENT & PLAN NOTE
Lab Results   Component Value Date    HGBA1C 7 9 (H) 07/27/2018    Hemoglobin A1c for tomorrow  Check blood sugars before meals and bedtime  Insulin sliding scale  Recent Labs      08/01/18   0653  08/01/18   0746  08/01/18   1027  08/01/18   1613   POCGLU  76  82  200*  160*       Blood Sugar Average: Last 72 hrs:  (P) 358 [id number]     Continue insulin, monitor Accu-Cheks   Lantus to 40 units b i d    Avoid hypokalemia

## 2018-08-01 NOTE — PROGRESS NOTES
Progress Note - Podiatry  Yvonne Aguilar 60 y o  male MRN: [id number]  [facility] Encounter: [id number]    Assessment/Plan:  1  Left heel OM with Cellulitis secondary to diabetic neuropathy and PAD  2  Left heel Diabetic ulcer, Friend 4   3  Bilateral dried blister on  plantar foot  4  Bilateral lateral leg healing superficial wounds secondary to scratching himself while in wheelchair     Plan:  - Patient is to have L BKA sometimes this week by vascular team     - Closely Monitoring dry blood blister to the plantar midfoot, there is no appearance of any fluid under the skin or acute/localized signs of infection noted   - Dressed left heel wound with Betadine paint, DSD and Ace bandage  Advised patient to Non weight bear to the left   - Dressed b/l leg healing ulcers with DSD    - Patient is wearing compression stocking on the right and ace bandages to the left to control the edema  Subjective/Objective   Chief Complaint:   Chief Complaint   Patient presents with    Wound Infection     pt with non healing wound on his left leg/foot area- has a bone infection in his left heel, pt stated that his MD told him that he needs IV abx an have his foot/ leg amputated        Subjective: 60 y o  y/o male was seen and evaluated at bedside  Patient is resting comfortably in bed  Homero denies nausea vomiting fever chills shortness of breath today  Blood pressure 164/84, pulse 82, temperature 98 4 °F (36 9 °C), temperature source Oral, resp  rate 18, height 6' 1" (1 854 m), weight 116 kg (256 lb 13 4 oz), SpO2 97 %  ,Body mass index is 33 89 kg/m²  Invasive Devices     Peripheral Intravenous Line            Peripheral IV 07/31/18 Left Forearm 1 day                Physical Exam:   General: Alert, cooperative and no distress  Lungs: Non labored breathing  Heart: Positive S1, S2  Abdomen: Soft, non-tender    Extremity:  Left plantar heel ulcer measuring 6 x 6 x 2 cm with 80% fibrotic wound base and mixture of granular and necrotic base  There is malodor with mild serous drainage from the heel  Bilateral lateral midfoot dry blood blisters appear to be stable without any acute signs of infection noted  Lab, Imaging and other studies:   CBC:   Lab Results   Component Value Date    WBC 8 81 08/01/2018    HGB 10 9 (L) 08/01/2018    HCT 35 6 (L) 08/01/2018    MCV 81 (L) 08/01/2018     08/01/2018    MCH 24 8 (L) 08/01/2018    MCHC 30 6 (L) 08/01/2018    RDW 15 5 (H) 08/01/2018    MPV 9 6 08/01/2018    NRBC 0 08/01/2018   , CMP:   Lab Results   Component Value Date     08/01/2018    K 3 8 08/01/2018     08/01/2018    CO2 28 08/01/2018    ANIONGAP 7 08/01/2018    BUN 10 08/01/2018    CREATININE 0 70 08/01/2018    GLUCOSE 76 08/01/2018    CALCIUM 8 5 08/01/2018    AST 18 08/01/2018    ALT 13 08/01/2018    ALKPHOS 50 08/01/2018    PROT 6 9 08/01/2018    BILITOT 0 28 08/01/2018    EGFR 103 08/01/2018       Imaging: I have personally reviewed pertinent films in PACS  EKG, Pathology, and Other Studies: I have personally reviewed pertinent reports    VTE Pharmacologic Prophylaxis: Enoxaparin (Lovenox)

## 2018-08-02 ENCOUNTER — ANESTHESIA EVENT (INPATIENT)
Dept: PERIOP | Facility: HOSPITAL | Age: 58
DRG: 617 | End: 2018-08-02
Payer: COMMERCIAL

## 2018-08-02 LAB
ALBUMIN SERPL BCP-MCNC: 2.5 G/DL (ref 3.5–5)
ALP SERPL-CCNC: 55 U/L (ref 46–116)
ALT SERPL W P-5'-P-CCNC: 13 U/L (ref 12–78)
ANION GAP SERPL CALCULATED.3IONS-SCNC: 4 MMOL/L (ref 4–13)
AST SERPL W P-5'-P-CCNC: 22 U/L (ref 5–45)
BASOPHILS # BLD AUTO: 0.05 THOUSANDS/ΜL (ref 0–0.1)
BASOPHILS NFR BLD AUTO: 1 % (ref 0–1)
BILIRUB SERPL-MCNC: 0.32 MG/DL (ref 0.2–1)
BUN SERPL-MCNC: 10 MG/DL (ref 5–25)
CALCIUM SERPL-MCNC: 8.3 MG/DL (ref 8.3–10.1)
CHLORIDE SERPL-SCNC: 104 MMOL/L (ref 100–108)
CO2 SERPL-SCNC: 30 MMOL/L (ref 21–32)
CREAT SERPL-MCNC: 0.76 MG/DL (ref 0.6–1.3)
EOSINOPHIL # BLD AUTO: 0.45 THOUSAND/ΜL (ref 0–0.61)
EOSINOPHIL NFR BLD AUTO: 5 % (ref 0–6)
ERYTHROCYTE [DISTWIDTH] IN BLOOD BY AUTOMATED COUNT: 15.5 % (ref 11.6–15.1)
GFR SERPL CREATININE-BSD FRML MDRD: 100 ML/MIN/1.73SQ M
GLUCOSE SERPL-MCNC: 125 MG/DL (ref 65–140)
GLUCOSE SERPL-MCNC: 132 MG/DL (ref 65–140)
GLUCOSE SERPL-MCNC: 140 MG/DL (ref 65–140)
GLUCOSE SERPL-MCNC: 187 MG/DL (ref 65–140)
GLUCOSE SERPL-MCNC: 227 MG/DL (ref 65–140)
HCT VFR BLD AUTO: 36.7 % (ref 36.5–49.3)
HGB BLD-MCNC: 11.1 G/DL (ref 12–17)
IMM GRANULOCYTES # BLD AUTO: 0.03 THOUSAND/UL (ref 0–0.2)
IMM GRANULOCYTES NFR BLD AUTO: 0 % (ref 0–2)
LYMPHOCYTES # BLD AUTO: 1.63 THOUSANDS/ΜL (ref 0.6–4.47)
LYMPHOCYTES NFR BLD AUTO: 18 % (ref 14–44)
MCH RBC QN AUTO: 24.9 PG (ref 26.8–34.3)
MCHC RBC AUTO-ENTMCNC: 30.2 G/DL (ref 31.4–37.4)
MCV RBC AUTO: 82 FL (ref 82–98)
MONOCYTES # BLD AUTO: 0.61 THOUSAND/ΜL (ref 0.17–1.22)
MONOCYTES NFR BLD AUTO: 7 % (ref 4–12)
NEUTROPHILS # BLD AUTO: 6.28 THOUSANDS/ΜL (ref 1.85–7.62)
NEUTS SEG NFR BLD AUTO: 69 % (ref 43–75)
NRBC BLD AUTO-RTO: 0 /100 WBCS
PLATELET # BLD AUTO: 287 THOUSANDS/UL (ref 149–390)
PMV BLD AUTO: 9.5 FL (ref 8.9–12.7)
POTASSIUM SERPL-SCNC: 3.7 MMOL/L (ref 3.5–5.3)
PROT SERPL-MCNC: 7.3 G/DL (ref 6.4–8.2)
RBC # BLD AUTO: 4.46 MILLION/UL (ref 3.88–5.62)
SODIUM SERPL-SCNC: 138 MMOL/L (ref 136–145)
WBC # BLD AUTO: 9.05 THOUSAND/UL (ref 4.31–10.16)

## 2018-08-02 PROCEDURE — 80053 COMPREHEN METABOLIC PANEL: CPT | Performed by: HOSPITALIST

## 2018-08-02 PROCEDURE — 99232 SBSQ HOSP IP/OBS MODERATE 35: CPT | Performed by: INTERNAL MEDICINE

## 2018-08-02 PROCEDURE — 99232 SBSQ HOSP IP/OBS MODERATE 35: CPT | Performed by: SURGERY

## 2018-08-02 PROCEDURE — 82948 REAGENT STRIP/BLOOD GLUCOSE: CPT

## 2018-08-02 PROCEDURE — 85025 COMPLETE CBC W/AUTO DIFF WBC: CPT | Performed by: HOSPITALIST

## 2018-08-02 PROCEDURE — 99232 SBSQ HOSP IP/OBS MODERATE 35: CPT | Performed by: HOSPITALIST

## 2018-08-02 RX ORDER — SODIUM PHOSPHATE, DIBASIC AND SODIUM PHOSPHATE, MONOBASIC 7; 19 G/133ML; G/133ML
1 ENEMA RECTAL ONCE
Status: COMPLETED | OUTPATIENT
Start: 2018-08-02 | End: 2018-08-02

## 2018-08-02 RX ORDER — INSULIN GLARGINE 100 [IU]/ML
15 INJECTION, SOLUTION SUBCUTANEOUS EVERY 12 HOURS SCHEDULED
Status: DISCONTINUED | OUTPATIENT
Start: 2018-08-02 | End: 2018-08-09 | Stop reason: HOSPADM

## 2018-08-02 RX ORDER — CHLORHEXIDINE GLUCONATE 0.12 MG/ML
15 RINSE ORAL EVERY 12 HOURS SCHEDULED
Status: DISCONTINUED | OUTPATIENT
Start: 2018-08-02 | End: 2018-08-07

## 2018-08-02 RX ADMIN — ATORVASTATIN CALCIUM 80 MG: 80 TABLET, FILM COATED ORAL at 16:33

## 2018-08-02 RX ADMIN — MIDODRINE HYDROCHLORIDE 5 MG: 5 TABLET ORAL at 12:09

## 2018-08-02 RX ADMIN — INSULIN LISPRO 2 UNITS: 100 INJECTION, SOLUTION INTRAVENOUS; SUBCUTANEOUS at 12:08

## 2018-08-02 RX ADMIN — ENOXAPARIN SODIUM 40 MG: 40 INJECTION SUBCUTANEOUS at 09:53

## 2018-08-02 RX ADMIN — DOCUSATE SODIUM 100 MG: 100 CAPSULE, LIQUID FILLED ORAL at 17:01

## 2018-08-02 RX ADMIN — FUROSEMIDE 20 MG: 20 TABLET ORAL at 09:54

## 2018-08-02 RX ADMIN — Medication 325 MG: at 16:33

## 2018-08-02 RX ADMIN — DOCUSATE SODIUM 100 MG: 100 CAPSULE, LIQUID FILLED ORAL at 09:54

## 2018-08-02 RX ADMIN — SODIUM PHOSPHATE 1 ENEMA: 7; 19 ENEMA RECTAL at 11:00

## 2018-08-02 RX ADMIN — POLYVINYL ALCOHOL 1 DROP: 14 SOLUTION/ DROPS OPHTHALMIC at 21:43

## 2018-08-02 RX ADMIN — MIDODRINE HYDROCHLORIDE 5 MG: 5 TABLET ORAL at 08:08

## 2018-08-02 RX ADMIN — CLOPIDOGREL BISULFATE 75 MG: 75 TABLET, FILM COATED ORAL at 09:54

## 2018-08-02 RX ADMIN — POLYVINYL ALCOHOL 1 DROP: 14 SOLUTION/ DROPS OPHTHALMIC at 09:54

## 2018-08-02 RX ADMIN — CEFAZOLIN SODIUM 2000 MG: 2 SOLUTION INTRAVENOUS at 02:02

## 2018-08-02 RX ADMIN — CHLORHEXIDINE GLUCONATE 15 ML: 1.2 RINSE ORAL at 14:25

## 2018-08-02 RX ADMIN — INSULIN GLARGINE 15 UNITS: 100 INJECTION, SOLUTION SUBCUTANEOUS at 21:56

## 2018-08-02 RX ADMIN — METRONIDAZOLE 500 MG: 500 TABLET ORAL at 14:25

## 2018-08-02 RX ADMIN — FLUDROCORTISONE ACETATE 0.1 MG: 0.1 TABLET ORAL at 09:54

## 2018-08-02 RX ADMIN — CHLORHEXIDINE GLUCONATE 15 ML: 1.2 RINSE ORAL at 21:43

## 2018-08-02 RX ADMIN — METRONIDAZOLE 500 MG: 500 TABLET ORAL at 06:38

## 2018-08-02 RX ADMIN — INSULIN LISPRO 4 UNITS: 100 INJECTION, SOLUTION INTRAVENOUS; SUBCUTANEOUS at 16:36

## 2018-08-02 RX ADMIN — METOCLOPRAMIDE HYDROCHLORIDE 5 MG: 10 TABLET ORAL at 09:54

## 2018-08-02 RX ADMIN — SENNOSIDES 8.6 MG: 8.6 TABLET, FILM COATED ORAL at 21:43

## 2018-08-02 RX ADMIN — MIDODRINE HYDROCHLORIDE 5 MG: 5 TABLET ORAL at 16:33

## 2018-08-02 RX ADMIN — TAMSULOSIN HYDROCHLORIDE 0.4 MG: 0.4 CAPSULE ORAL at 16:33

## 2018-08-02 RX ADMIN — INSULIN GLARGINE 40 UNITS: 100 INJECTION, SOLUTION SUBCUTANEOUS at 09:53

## 2018-08-02 RX ADMIN — CEFAZOLIN SODIUM 2000 MG: 2 SOLUTION INTRAVENOUS at 09:54

## 2018-08-02 RX ADMIN — POLYVINYL ALCOHOL 1 DROP: 14 SOLUTION/ DROPS OPHTHALMIC at 16:35

## 2018-08-02 RX ADMIN — METRONIDAZOLE 500 MG: 500 TABLET ORAL at 21:43

## 2018-08-02 RX ADMIN — Medication 325 MG: at 08:08

## 2018-08-02 RX ADMIN — CEFAZOLIN SODIUM 2000 MG: 2 SOLUTION INTRAVENOUS at 16:37

## 2018-08-02 NOTE — ASSESSMENT & PLAN NOTE
Fleet enema-   To be repeated today  Stool softeners  He reportedly has autonomic dysfunction which affects his bowels

## 2018-08-02 NOTE — RESTORATIVE TECHNICIAN NOTE
Restorative Specialist Mobility Note       Activity: Ambulate in guardado, Ambulate in room, Bathroom privileges, Chair, Dangle, Stand at bedside (Educated/encouraged pt to ambulate with assistance 3-4 x's/day  Bed alarm on   Pt callbell, phone/tray within reach )     Assistive Device: Front wheel walker (L Darco Wedge shoe and R surgical shoe on)          Camilla MALAVE, Restorative Technician, [employer]

## 2018-08-02 NOTE — PROGRESS NOTES
Progress Note - Shay Duron 1960, 60 y o  male MRN: [id number]    Unit/Bed#: [facility] 724-01 Encounter: [id number]    Primary Care Provider: Trevon Montero DO   Date and time admitted to hospital: 7/26/2018  5:03 PM        Obstructive sleep apnea   Assessment & Plan    CPAP noncompliant        Diabetic ulcer of left heel associated with type 2 diabetes mellitus, limited to breakdown of skin [facility])   Assessment & Plan    Lab Results   Component Value Date    HGBA1C 7 9 (H) 07/27/2018    Hemoglobin A1c for tomorrow  Check blood sugars before meals and bedtime  Insulin sliding scale  Recent Labs      08/01/18   1613  08/01/18   2056  08/02/18   0659  08/02/18   1039   POCGLU  160*  136  125  187*       Blood Sugar Average: Last 72 hrs:  (P) 007 [id number]     Continue insulin, monitor Accu-Cheks   Lantus to 40 units b i d  Avoid hypokalemia         Other constipation   Assessment & Plan    Fleet enema-   To be repeated today  Stool softeners  He reportedly has autonomic dysfunction which affects his bowels  Triple vessel coronary artery disease   Assessment & Plan    Continue Plavix statin        PAD (peripheral artery disease) ([facility])   Assessment & Plan    On statin Plavix        Benign essential HTN   Assessment & Plan    /84  Continue monitoring  Continue meds        * Non-healing wound of left heel   Assessment & Plan    BCx NGTD after 4 days  Local wound care  BKA per vascular surgery- 8/3/18  IV antibiotics per Infectious Disease cefazolin/flagyl                   VTE Pharmacologic Prophylaxis:   Pharmacologic: Enoxaparin (Lovenox)  Mechanical VTE Prophylaxis in Place: Yes    Patient Centered Rounds: I have performed bedside rounds with nursing staff today  Discussions with Specialists or Other Care Team Provider: yes    Education and Discussions with Family / Patient: yes    Time Spent for Care: 45 minutes    More than 50% of total time spent on counseling and coordination of care as described above  Current Length of Stay: 7 day(s)    Current Patient Status: Inpatient   Certification Statement: The patient will continue to require additional inpatient hospital stay due to med mgt awaiting BKA    Discharge Plan: home when medically stable    Code Status: Level 1 - Full Code      Subjective:   Pain controlled on left leg  Had bowel movements yesterday after having Fleet enema  Review of systems negative otherwise    Objective:     Vitals:   Temp (24hrs), Av 2 °F (36 8 °C), Min:98 °F (36 7 °C), Max:98 4 °F (36 9 °C)    HR:  [76-83] 76  Resp:  [18] 18  BP: (141-164)/(80-84) 141/84  SpO2:  [97 %-98 %] 98 %  Body mass index is 33 74 kg/m²  Input and Output Summary (last 24 hours): Intake/Output Summary (Last 24 hours) at 18 1419  Last data filed at 18 1200   Gross per 24 hour   Intake              840 ml   Output              650 ml   Net              190 ml       Physical Exam:     Physical Exam   Constitutional: He is oriented to person, place, and time  No distress  HENT:   Head: Normocephalic and atraumatic  Mouth/Throat: Oropharynx is clear and moist    Eyes: Conjunctivae and EOM are normal  Pupils are equal, round, and reactive to light  Neck: Normal range of motion  Neck supple  No JVD present  Cardiovascular: Normal rate and normal heart sounds  Exam reveals no gallop and no friction rub  No murmur heard  Pulmonary/Chest: Effort normal and breath sounds normal  No respiratory distress  He has no wheezes  He has no rales  Abdominal: Soft  Bowel sounds are normal  He exhibits no distension  There is no tenderness  There is no rebound and no guarding  Musculoskeletal: Normal range of motion  He exhibits deformity  He exhibits no edema or tenderness  Left lower extremity wound   Neurological: He is alert and oriented to person, place, and time  He has normal reflexes  He displays normal reflexes  No cranial nerve deficit   He exhibits normal muscle tone  Coordination normal    Skin: Skin is warm  No erythema  Psychiatric: He has a normal mood and affect  Additional Data:     Labs:      Results from last 7 days  Lab Units 18  0602   WBC Thousand/uL 9 05   HEMOGLOBIN g/dL 11 1*   HEMATOCRIT % 36 7   PLATELETS Thousands/uL 287   NEUTROS PCT % 69   LYMPHS PCT % 18   MONOS PCT % 7   EOS PCT % 5       Results from last 7 days  Lab Units 18  0602   SODIUM mmol/L 138   POTASSIUM mmol/L 3 7   CHLORIDE mmol/L 104   CO2 mmol/L 30   BUN mg/dL 10   CREATININE mg/dL 0 76   CALCIUM mg/dL 8 3   TOTAL PROTEIN g/dL 7 3   BILIRUBIN TOTAL mg/dL 0 32   ALK PHOS U/L 55   ALT U/L 13   AST U/L 22   GLUCOSE RANDOM mg/dL 132       Results from last 7 days  Lab Units 18  1812   INR  1 05       Results from last 7 days  Lab Units 18  1039 18  0659 18  2056 18  1613 18  1027 18  0746 18  0653 18  2105 18  1602 18  1058 18  0706 18  2113   POC GLUCOSE mg/dl 187* 125 136 160* 200* 82 76 138 170* 129 79 155*       Results from last 7 days  Lab Units 18  0606   HEMOGLOBIN A1C % 7 9*         * I Have Reviewed All Lab Data Listed Above  * Additional Pertinent Lab Tests Reviewed: [facility] Admission Reviewed    Imaging:    Imaging Reports Reviewed Today   Recent Cultures (last 7 days):       Results from last 7 days  Lab Units 18  1812   BLOOD CULTURE  No Growth After 5 Days  No Growth After 5 Days         Last 24 Hours Medication List:     Current Facility-Administered Medications:  acetaminophen 650 mg Oral Q6H PRN Parke Holter, MD    aluminum-magnesium hydroxide-simethicone 15 mL Oral Q6H PRN Parke Holter, MD    atorvastatin 80 mg Oral Daily With Yvonne Murry MD    bisacodyl 10 mg Rectal Daily PRN Yemi Ureña MD    bisacodyl 10 mg Rectal Once Michaeleen [place], PA-C    bromfenac sodium 1 drop Right Eye Daily Parke Holter, MD cefazolin 2,000 mg Intravenous Q8H Concepcion Guzmán MD Last Rate: 2,000 mg (08/02/18 0954)   chlorhexidine 15 mL Swish & Spit Q12H [address] Rosalind Clark PA-C    clopidogrel 75 mg Oral Daily Samanta Li MD    docusate sodium 100 mg Oral BID Samanta Li MD    enoxaparin 40 mg Subcutaneous Daily Samanta Li MD    ferrous sulfate 325 mg Oral BID With Meals Samanta Li MD    fludrocortisone 0 1 mg Oral Daily Samanta Li MD    furosemide 20 mg Oral Daily Samanta Li MD    hydrALAZINE 5 mg Intravenous Q6H PRN Shay Bejarano PA-C    insulin glargine 40 Units Subcutaneous Q12H [address] Arnaldo Mariee MD    insulin lispro 1-5 Units Subcutaneous HS Samanta Li MD    insulin lispro 2-12 Units Subcutaneous TID AC Samanta Li MD    metoclopramide 5 mg Oral Daily Samanta Li MD    metroNIDAZOLE 500 mg Oral [facility] Concepcion Guzmán MD    midodrine 5 mg Oral TID With Meals Samanta Li MD    mineral oil 30 mL Oral Once Rosalind Clark PA-C    polyethylene glycol 17 g Oral Daily Concepcion Guzmán MD    polyvinyl alcohol 1 drop Right Eye TID Kenyetta Rowan PA-C    senna 1 tablet Oral HS Rosalind Clark PA-C    sodium chloride (PF) 3 mL Intravenous PRN Ericka Miller DO    tamsulosin 0 4 mg Oral Daily With Dominik Huddleston MD         Today, Patient Was Seen By: Soniya Cardona MD    ** Please Note: Dictation voice to text software may have been used in the creation of this document   **

## 2018-08-02 NOTE — ASSESSMENT & PLAN NOTE
BCx NGTD after 4 days  Local wound care  BKA per vascular surgery- 8/3/18  IV antibiotics per Infectious Disease cefazolin/flagyl

## 2018-08-02 NOTE — SOCIAL WORK
Discussed pt during care coordination rounds with Dr Nae Lindsay  She stated that the plan is for pt to go tot he OR tomorrow with vascular for a left BKA  Pt continues on IV antibiotics per ID  CM spoke with Paulo Montiel PA-C and Dr Jana Mario to confirm above  She stated that pt is on the OR schedule on Friday 8/3 for left BKA  Will continue to follow for discharge needs and follow up on therapy evals post-op

## 2018-08-02 NOTE — PROGRESS NOTES
Progress Note - Infectious Disease   Manny Tran 60 y o  male MRN: [id number]  Unit/Bed#: [facility] 724-01 Encounter: [id number]      Impression/Recommendations:  1   Left heel chronic diabetic ulcer with underlying osteomyelitis/superinfection  Consider GPC/GNRs/anaerobes  Foot deemed nonsalvageable per podiatry  Blood cultures negative  Clinically stable without sepsis  Rec:  ? Continue cefazolin/Flagyl for now  ? Await BKA tomorrow which should offer definitive cure  ? Anticipate discontinuing antibiotics postoperatively  ? Follow temperatures and WBC closely  ? Continue LWC per nursing/podiatry  ? Supportive care as per the primary service     2  DM with DPN  A1C 7 9  Rec:  ? Continue management as per the primary service     3   PAD             Status post recent Agram/TPA  Rec:  ? BKA tomorrow  ? Close vascular surgery follow-up ongoing     The patient is stable from an ID standpoint     Antibiotics:  Cefazolin #5  Flagyl #8  Antibiotics #8    Subjective:  Patient seen on AM rounds  Denies fevers, chills, sweats, nausea, vomiting, or diarrhea  24 Hour Events:  No documented fevers, chills, sweats, nausea, vomiting, or diarrhea  Objective:  Vitals:  HR:  [76-83] 76  Resp:  [18] 18  BP: (141-164)/(80-84) 141/84  SpO2:  [97 %-98 %] 98 %  Temp (24hrs), Av 2 °F (36 8 °C), Min:98 °F (36 7 °C), Max:98 4 °F (36 9 °C)  Current: Temperature: 98 °F (36 7 °C)    Physical Exam:   General:  No acute distress  Psychiatric:  Awake and alert  Pulmonary:  Normal respiratory excursion without accessory muscle use  Abdomen:  Soft, nontender  Extremities:  Mild left leg edema with Ace wrap intact  Skin:  No rashes    Lab Results:  I have personally reviewed pertinent labs      Results from last 7 days  Lab Units 18  0602 18  0449 18  0606 18  1812   SODIUM mmol/L 138 139 138 136   POTASSIUM mmol/L 3 7 3 8 3 4* 3 6   CHLORIDE mmol/L 104 104 105 102   CO2 mmol/L 30 28 26 29   ANION GAP mmol/L 4 7 7 5 BUN mg/dL 10 10 11 12   CREATININE mg/dL 0 76 0 70 0 73 0 93   EGFR ml/min/1 73sq m 100 103 102 90   GLUCOSE RANDOM mg/dL 132 76 75 105   CALCIUM mg/dL 8 3 8 5 8 3 8 7   AST U/L 22 18  --  15   ALT U/L 13 13  --  14   ALK PHOS U/L 55 50  --  69   TOTAL PROTEIN g/dL 7 3 6 9  --  8 0   BILIRUBIN TOTAL mg/dL 0 32 0 28  --  0 27       Results from last 7 days  Lab Units 08/02/18  0602 08/01/18  0449 07/27/18  0606   WBC Thousand/uL 9 05 8 81 9 41   HEMOGLOBIN g/dL 11 1* 10 9* 10 3*   PLATELETS Thousands/uL 287 308 283       Results from last 7 days  Lab Units 07/26/18  1812   BLOOD CULTURE  No Growth After 5 Days  No Growth After 5 Days  Imaging Studies:   I have personally reviewed pertinent imaging study reports and images in PACS  EKG, Pathology, and Other Studies:   I have personally reviewed pertinent reports

## 2018-08-02 NOTE — ANESTHESIA PREPROCEDURE EVALUATION
Review of Systems/Medical History          Cardiovascular  EKG reviewed, Hyperlipidemia, Hypertension , Past MI 3-6 months, CAD , CAD status: 3VD, History of CABG (3/18), CHF , PVD,   Comment: EF-55-60% Inf  Wall Hypokinesis,3/18,  Pulmonary       GI/Hepatic    Pyloric obstruction,   Comment: Gastroparesis          Endo/Other  Diabetes Insulin,   Obesity    GYN       Hematology  Anemia ,     Musculoskeletal       Neurology   Psychology           Physical Exam    Airway  Comment: Grade 1 view 3/18 for CABG Small Mouth, Large Tongue  Mallampati score: III  TM Distance: >3 FB  Neck ROM: full     Dental       Cardiovascular      Pulmonary      Other Findings        Anesthesia Plan  ASA Score- 3     Anesthesia Type- general with ASA Monitors  Additional Monitors:   Airway Plan: ETT  Plan Factors-Patient not instructed to abstain from smoking on day of procedure  Patient did not smoke on day of surgery  Induction- intravenous  Postoperative Plan-     Informed Consent- Anesthetic plan and risks discussed with patient  I personally reviewed this patient with the CRNA  Discussed and agreed on the Anesthesia Plan with the CRNA  Radha Cisneros

## 2018-08-02 NOTE — PROGRESS NOTES
Pt for OR tomorrow however now blood sugar is 136 and pt is refusing to take full dose Lantus (he would not do this at home)  He is agreeable to taking 20 units at this time

## 2018-08-02 NOTE — ASSESSMENT & PLAN NOTE
Lab Results   Component Value Date    HGBA1C 7 9 (H) 07/27/2018    Hemoglobin A1c for tomorrow  Check blood sugars before meals and bedtime  Insulin sliding scale  Recent Labs      08/01/18   1613  08/01/18   2056  08/02/18   0659  08/02/18   1039   POCGLU  160*  136  125  187*       Blood Sugar Average: Last 72 hrs:  (P) 368 [id number]     Continue insulin, monitor Accu-Cheks   Lantus to 40 units b i d    Avoid hypokalemia

## 2018-08-02 NOTE — NURSING NOTE
Pt is due for 40 units of lantus, but refusing to take 40 units  Because his BS is only 136  Pt offered snack and importance of taking lantus explained to pt  He is still refusing to take the 40 units, but it willing to take 20 units at this time  Elizabeth VINCENT aware

## 2018-08-02 NOTE — PROGRESS NOTES
Progress Note - Vascular Surgery   Goldie Moreno 60 y o  male MRN: [id number]  Unit/Bed#: [facility] 724-01 Encounter: [id number]    Assessment:  61 y/o M w/ PAD, p/w nonsalvagable L foot wound      Plan:  --L BKA Friday 8/3  --Cont Abx per ID  --f/u AM labs  --Continue care per primary team    Subjective/Objective      Subjective:     No acute events overnight  Pt denies any complaints this AM  Anticipating his L BKA tomorrow  Objective:     Blood pressure 151/80, pulse 83, temperature 98 1 °F (36 7 °C), temperature source Oral, resp  rate 18, height 6' 1" (1 854 m), weight 116 kg (256 lb 13 4 oz), SpO2 97 %  ,Body mass index is 33 89 kg/m²  Intake/Output Summary (Last 24 hours) at 08/02/18 0518  Last data filed at 08/01/18 1810   Gross per 24 hour   Intake             1420 ml   Output             1700 ml   Net             -280 ml       Invasive Devices     Peripheral Intravenous Line            Peripheral IV 08/01/18 Right Forearm less than 1 day                Physical Exam:    GEN: NAD  HEENT: MMM  CV: RRR  Lung: normal effort  Ab: Soft, NT/ND  Extrem: No CCE; LLE wrapped in ACE bandage; L foot in brace  Neuro:  A+Ox3, motor and sensation grossly intact      Lab, Imaging and other studies:CBC: No results found for: WBC, HGB, HCT, MCV, PLT, ADJUSTEDWBC, MCH, MCHC, RDW, MPV, NRBC, CMP: No results found for: NA, K, CL, CO2, ANIONGAP, BUN, CREATININE, GLUCOSE, CALCIUM, AST, ALT, ALKPHOS, PROT, ALBUMIN, BILITOT, EGFR, Coagulation: No results found for: PT, INR, APTT, Urinalysis: No results found for: COLORU, CLARITYU, SPECGRAV, PHUR, LEUKOCYTESUR, NITRITE, PROTEINUA, GLUCOSEU, KETONESU, BILIRUBINUR, BLOODU, Amylase: No results found for: AMYLASE, Lipase: No results found for: LIPASE  VTE Pharmacologic Prophylaxis: Enoxaparin (Lovenox)  VTE Mechanical Prophylaxis: sequential compression device

## 2018-08-02 NOTE — PROGRESS NOTES
Pt a&ox4  Resting in bed comfortably  Pt denies pain  Pt IV abx infusing  Pt agrees to CHG bath after family leaves this evening   Will cont to monitor

## 2018-08-03 ENCOUNTER — ANESTHESIA (INPATIENT)
Dept: PERIOP | Facility: HOSPITAL | Age: 58
DRG: 617 | End: 2018-08-03
Payer: COMMERCIAL

## 2018-08-03 LAB
ALBUMIN SERPL BCP-MCNC: 2.4 G/DL (ref 3.5–5)
ALP SERPL-CCNC: 54 U/L (ref 46–116)
ALT SERPL W P-5'-P-CCNC: 14 U/L (ref 12–78)
ANION GAP SERPL CALCULATED.3IONS-SCNC: 6 MMOL/L (ref 4–13)
ANION GAP SERPL CALCULATED.3IONS-SCNC: 7 MMOL/L (ref 4–13)
AST SERPL W P-5'-P-CCNC: 24 U/L (ref 5–45)
BASOPHILS # BLD AUTO: 0.07 THOUSANDS/ΜL (ref 0–0.1)
BASOPHILS NFR BLD AUTO: 1 % (ref 0–1)
BILIRUB SERPL-MCNC: 0.23 MG/DL (ref 0.2–1)
BUN SERPL-MCNC: 10 MG/DL (ref 5–25)
BUN SERPL-MCNC: 12 MG/DL (ref 5–25)
CALCIUM SERPL-MCNC: 8 MG/DL (ref 8.3–10.1)
CALCIUM SERPL-MCNC: 8.4 MG/DL (ref 8.3–10.1)
CHLORIDE SERPL-SCNC: 102 MMOL/L (ref 100–108)
CHLORIDE SERPL-SCNC: 106 MMOL/L (ref 100–108)
CO2 SERPL-SCNC: 26 MMOL/L (ref 21–32)
CO2 SERPL-SCNC: 29 MMOL/L (ref 21–32)
CREAT SERPL-MCNC: 0.79 MG/DL (ref 0.6–1.3)
CREAT SERPL-MCNC: 0.8 MG/DL (ref 0.6–1.3)
EOSINOPHIL # BLD AUTO: 0.27 THOUSAND/ΜL (ref 0–0.61)
EOSINOPHIL NFR BLD AUTO: 3 % (ref 0–6)
ERYTHROCYTE [DISTWIDTH] IN BLOOD BY AUTOMATED COUNT: 15.6 % (ref 11.6–15.1)
ERYTHROCYTE [DISTWIDTH] IN BLOOD BY AUTOMATED COUNT: 15.9 % (ref 11.6–15.1)
GFR SERPL CREATININE-BSD FRML MDRD: 98 ML/MIN/1.73SQ M
GFR SERPL CREATININE-BSD FRML MDRD: 98 ML/MIN/1.73SQ M
GLUCOSE SERPL-MCNC: 109 MG/DL (ref 65–140)
GLUCOSE SERPL-MCNC: 114 MG/DL (ref 65–140)
GLUCOSE SERPL-MCNC: 116 MG/DL (ref 65–140)
GLUCOSE SERPL-MCNC: 124 MG/DL (ref 65–140)
GLUCOSE SERPL-MCNC: 124 MG/DL (ref 65–140)
GLUCOSE SERPL-MCNC: 138 MG/DL (ref 65–140)
GLUCOSE SERPL-MCNC: 85 MG/DL (ref 65–140)
GLUCOSE SERPL-MCNC: 94 MG/DL (ref 65–140)
HCT VFR BLD AUTO: 36 % (ref 36.5–49.3)
HCT VFR BLD AUTO: 36.9 % (ref 36.5–49.3)
HGB BLD-MCNC: 11 G/DL (ref 12–17)
HGB BLD-MCNC: 11.6 G/DL (ref 12–17)
IMM GRANULOCYTES # BLD AUTO: 0.04 THOUSAND/UL (ref 0–0.2)
IMM GRANULOCYTES NFR BLD AUTO: 0 % (ref 0–2)
LYMPHOCYTES # BLD AUTO: 1.98 THOUSANDS/ΜL (ref 0.6–4.47)
LYMPHOCYTES NFR BLD AUTO: 20 % (ref 14–44)
MCH RBC QN AUTO: 25 PG (ref 26.8–34.3)
MCH RBC QN AUTO: 26.5 PG (ref 26.8–34.3)
MCHC RBC AUTO-ENTMCNC: 30.6 G/DL (ref 31.4–37.4)
MCHC RBC AUTO-ENTMCNC: 31.4 G/DL (ref 31.4–37.4)
MCV RBC AUTO: 82 FL (ref 82–98)
MCV RBC AUTO: 84 FL (ref 82–98)
MONOCYTES # BLD AUTO: 0.62 THOUSAND/ΜL (ref 0.17–1.22)
MONOCYTES NFR BLD AUTO: 6 % (ref 4–12)
NEUTROPHILS # BLD AUTO: 6.77 THOUSANDS/ΜL (ref 1.85–7.62)
NEUTS SEG NFR BLD AUTO: 70 % (ref 43–75)
NRBC BLD AUTO-RTO: 0 /100 WBCS
PLATELET # BLD AUTO: 266 THOUSANDS/UL (ref 149–390)
PLATELET # BLD AUTO: 310 THOUSANDS/UL (ref 149–390)
PMV BLD AUTO: 9.4 FL (ref 8.9–12.7)
PMV BLD AUTO: 9.7 FL (ref 8.9–12.7)
POTASSIUM SERPL-SCNC: 3.9 MMOL/L (ref 3.5–5.3)
POTASSIUM SERPL-SCNC: 3.9 MMOL/L (ref 3.5–5.3)
PROT SERPL-MCNC: 7.4 G/DL (ref 6.4–8.2)
RBC # BLD AUTO: 4.38 MILLION/UL (ref 3.88–5.62)
RBC # BLD AUTO: 4.4 MILLION/UL (ref 3.88–5.62)
SODIUM SERPL-SCNC: 137 MMOL/L (ref 136–145)
SODIUM SERPL-SCNC: 139 MMOL/L (ref 136–145)
WBC # BLD AUTO: 28.91 THOUSAND/UL (ref 4.31–10.16)
WBC # BLD AUTO: 9.75 THOUSAND/UL (ref 4.31–10.16)

## 2018-08-03 PROCEDURE — 82948 REAGENT STRIP/BLOOD GLUCOSE: CPT

## 2018-08-03 PROCEDURE — 94762 N-INVAS EAR/PLS OXIMTRY CONT: CPT

## 2018-08-03 PROCEDURE — 99232 SBSQ HOSP IP/OBS MODERATE 35: CPT | Performed by: HOSPITALIST

## 2018-08-03 PROCEDURE — 0Y6J0Z2 DETACHMENT AT LEFT LOWER LEG, MID, OPEN APPROACH: ICD-10-PCS | Performed by: SURGERY

## 2018-08-03 PROCEDURE — 85027 COMPLETE CBC AUTOMATED: CPT | Performed by: SURGERY

## 2018-08-03 PROCEDURE — 88300 SURGICAL PATH GROSS: CPT | Performed by: PATHOLOGY

## 2018-08-03 PROCEDURE — 85025 COMPLETE CBC W/AUTO DIFF WBC: CPT | Performed by: HOSPITALIST

## 2018-08-03 PROCEDURE — 35301 RECHANNELING OF ARTERY: CPT | Performed by: SURGERY

## 2018-08-03 PROCEDURE — 80048 BASIC METABOLIC PNL TOTAL CA: CPT | Performed by: SURGERY

## 2018-08-03 PROCEDURE — 80053 COMPREHEN METABOLIC PANEL: CPT | Performed by: HOSPITALIST

## 2018-08-03 PROCEDURE — P9021 RED BLOOD CELLS UNIT: HCPCS

## 2018-08-03 PROCEDURE — 99232 SBSQ HOSP IP/OBS MODERATE 35: CPT | Performed by: INTERNAL MEDICINE

## 2018-08-03 RX ORDER — MIDAZOLAM HYDROCHLORIDE 1 MG/ML
INJECTION INTRAMUSCULAR; INTRAVENOUS AS NEEDED
Status: DISCONTINUED | OUTPATIENT
Start: 2018-08-03 | End: 2018-08-03 | Stop reason: SURG

## 2018-08-03 RX ORDER — ROCURONIUM BROMIDE 10 MG/ML
INJECTION, SOLUTION INTRAVENOUS AS NEEDED
Status: DISCONTINUED | OUTPATIENT
Start: 2018-08-03 | End: 2018-08-03 | Stop reason: SURG

## 2018-08-03 RX ORDER — GLYCOPYRROLATE 0.2 MG/ML
INJECTION INTRAMUSCULAR; INTRAVENOUS AS NEEDED
Status: DISCONTINUED | OUTPATIENT
Start: 2018-08-03 | End: 2018-08-03 | Stop reason: SURG

## 2018-08-03 RX ORDER — SUCCINYLCHOLINE CHLORIDE 20 MG/ML
INJECTION INTRAMUSCULAR; INTRAVENOUS AS NEEDED
Status: DISCONTINUED | OUTPATIENT
Start: 2018-08-03 | End: 2018-08-03 | Stop reason: SURG

## 2018-08-03 RX ORDER — FENTANYL CITRATE 50 UG/ML
INJECTION, SOLUTION INTRAMUSCULAR; INTRAVENOUS AS NEEDED
Status: DISCONTINUED | OUTPATIENT
Start: 2018-08-03 | End: 2018-08-03 | Stop reason: SURG

## 2018-08-03 RX ORDER — SODIUM CHLORIDE 9 MG/ML
125 INJECTION, SOLUTION INTRAVENOUS CONTINUOUS
Status: DISCONTINUED | OUTPATIENT
Start: 2018-08-03 | End: 2018-08-05

## 2018-08-03 RX ORDER — BUPIVACAINE HYDROCHLORIDE 5 MG/ML
INJECTION, SOLUTION PERINEURAL AS NEEDED
Status: DISCONTINUED | OUTPATIENT
Start: 2018-08-03 | End: 2018-08-03 | Stop reason: HOSPADM

## 2018-08-03 RX ORDER — ONDANSETRON 2 MG/ML
4 INJECTION INTRAMUSCULAR; INTRAVENOUS ONCE AS NEEDED
Status: DISCONTINUED | OUTPATIENT
Start: 2018-08-03 | End: 2018-08-03 | Stop reason: HOSPADM

## 2018-08-03 RX ORDER — DEXTROSE MONOHYDRATE 50 MG/ML
INJECTION, SOLUTION INTRAVENOUS CONTINUOUS PRN
Status: DISCONTINUED | OUTPATIENT
Start: 2018-08-03 | End: 2018-08-03 | Stop reason: SURG

## 2018-08-03 RX ORDER — PROPOFOL 10 MG/ML
INJECTION, EMULSION INTRAVENOUS AS NEEDED
Status: DISCONTINUED | OUTPATIENT
Start: 2018-08-03 | End: 2018-08-03 | Stop reason: SURG

## 2018-08-03 RX ORDER — FENTANYL CITRATE/PF 50 MCG/ML
25 SYRINGE (ML) INJECTION
Status: DISCONTINUED | OUTPATIENT
Start: 2018-08-03 | End: 2018-08-03 | Stop reason: HOSPADM

## 2018-08-03 RX ORDER — DEXTROSE MONOHYDRATE 50 MG/ML
INJECTION, SOLUTION INTRAVENOUS CONTINUOUS PRN
Status: DISCONTINUED | OUTPATIENT
Start: 2018-08-03 | End: 2018-08-03

## 2018-08-03 RX ORDER — EPHEDRINE SULFATE 50 MG/ML
INJECTION, SOLUTION INTRAVENOUS AS NEEDED
Status: DISCONTINUED | OUTPATIENT
Start: 2018-08-03 | End: 2018-08-03 | Stop reason: SURG

## 2018-08-03 RX ORDER — LIDOCAINE HYDROCHLORIDE 10 MG/ML
INJECTION, SOLUTION INFILTRATION; PERINEURAL AS NEEDED
Status: DISCONTINUED | OUTPATIENT
Start: 2018-08-03 | End: 2018-08-03 | Stop reason: SURG

## 2018-08-03 RX ORDER — SODIUM CHLORIDE 9 MG/ML
INJECTION, SOLUTION INTRAVENOUS CONTINUOUS PRN
Status: DISCONTINUED | OUTPATIENT
Start: 2018-08-03 | End: 2018-08-03 | Stop reason: SURG

## 2018-08-03 RX ORDER — ALBUMIN, HUMAN INJ 5% 5 %
SOLUTION INTRAVENOUS CONTINUOUS PRN
Status: DISCONTINUED | OUTPATIENT
Start: 2018-08-03 | End: 2018-08-03 | Stop reason: SURG

## 2018-08-03 RX ORDER — SODIUM CHLORIDE, SODIUM LACTATE, POTASSIUM CHLORIDE, CALCIUM CHLORIDE 600; 310; 30; 20 MG/100ML; MG/100ML; MG/100ML; MG/100ML
125 INJECTION, SOLUTION INTRAVENOUS CONTINUOUS
Status: DISCONTINUED | OUTPATIENT
Start: 2018-08-03 | End: 2018-08-03

## 2018-08-03 RX ORDER — ONDANSETRON 2 MG/ML
INJECTION INTRAMUSCULAR; INTRAVENOUS AS NEEDED
Status: DISCONTINUED | OUTPATIENT
Start: 2018-08-03 | End: 2018-08-03 | Stop reason: SURG

## 2018-08-03 RX ORDER — SODIUM CHLORIDE, SODIUM LACTATE, POTASSIUM CHLORIDE, CALCIUM CHLORIDE 600; 310; 30; 20 MG/100ML; MG/100ML; MG/100ML; MG/100ML
INJECTION, SOLUTION INTRAVENOUS CONTINUOUS PRN
Status: DISCONTINUED | OUTPATIENT
Start: 2018-08-03 | End: 2018-08-03 | Stop reason: SURG

## 2018-08-03 RX ORDER — METOCLOPRAMIDE HYDROCHLORIDE 5 MG/ML
INJECTION INTRAMUSCULAR; INTRAVENOUS AS NEEDED
Status: DISCONTINUED | OUTPATIENT
Start: 2018-08-03 | End: 2018-08-03 | Stop reason: SURG

## 2018-08-03 RX ORDER — HYDROMORPHONE HYDROCHLORIDE 2 MG/ML
INJECTION, SOLUTION INTRAMUSCULAR; INTRAVENOUS; SUBCUTANEOUS AS NEEDED
Status: DISCONTINUED | OUTPATIENT
Start: 2018-08-03 | End: 2018-08-03 | Stop reason: SURG

## 2018-08-03 RX ORDER — CEFAZOLIN SODIUM 1 G/3ML
INJECTION, POWDER, FOR SOLUTION INTRAMUSCULAR; INTRAVENOUS AS NEEDED
Status: DISCONTINUED | OUTPATIENT
Start: 2018-08-03 | End: 2018-08-03 | Stop reason: SURG

## 2018-08-03 RX ADMIN — ENOXAPARIN SODIUM 40 MG: 40 INJECTION SUBCUTANEOUS at 09:54

## 2018-08-03 RX ADMIN — CLOPIDOGREL BISULFATE 75 MG: 75 TABLET, FILM COATED ORAL at 09:49

## 2018-08-03 RX ADMIN — POLYVINYL ALCOHOL 1 DROP: 14 SOLUTION/ DROPS OPHTHALMIC at 21:55

## 2018-08-03 RX ADMIN — CHLORHEXIDINE GLUCONATE 15 ML: 1.2 RINSE ORAL at 21:53

## 2018-08-03 RX ADMIN — CEFAZOLIN 2000 MG: 1 INJECTION, POWDER, FOR SOLUTION INTRAVENOUS at 15:58

## 2018-08-03 RX ADMIN — CEFAZOLIN SODIUM 2000 MG: 2 SOLUTION INTRAVENOUS at 01:21

## 2018-08-03 RX ADMIN — HYDROMORPHONE HYDROCHLORIDE 0.5 MG: 2 INJECTION, SOLUTION INTRAMUSCULAR; INTRAVENOUS; SUBCUTANEOUS at 18:28

## 2018-08-03 RX ADMIN — NEOSTIGMINE METHYLSULFATE 2 MG: 1 INJECTION, SOLUTION INTRAMUSCULAR; INTRAVENOUS; SUBCUTANEOUS at 18:04

## 2018-08-03 RX ADMIN — FENTANYL CITRATE 25 MCG: 50 INJECTION, SOLUTION INTRAMUSCULAR; INTRAVENOUS at 18:46

## 2018-08-03 RX ADMIN — INSULIN GLARGINE 15 UNITS: 100 INJECTION, SOLUTION SUBCUTANEOUS at 21:52

## 2018-08-03 RX ADMIN — POLYVINYL ALCOHOL 1 DROP: 14 SOLUTION/ DROPS OPHTHALMIC at 10:10

## 2018-08-03 RX ADMIN — EPHEDRINE SULFATE 10 MG: 50 INJECTION, SOLUTION INTRAMUSCULAR; INTRAVENOUS; SUBCUTANEOUS at 16:00

## 2018-08-03 RX ADMIN — MIDODRINE HYDROCHLORIDE 5 MG: 5 TABLET ORAL at 11:59

## 2018-08-03 RX ADMIN — GLYCOPYRROLATE 0.4 MG: 0.2 INJECTION, SOLUTION INTRAMUSCULAR; INTRAVENOUS at 18:04

## 2018-08-03 RX ADMIN — SODIUM CHLORIDE, SODIUM LACTATE, POTASSIUM CHLORIDE, AND CALCIUM CHLORIDE: .6; .31; .03; .02 INJECTION, SOLUTION INTRAVENOUS at 15:41

## 2018-08-03 RX ADMIN — METRONIDAZOLE 500 MG: 500 TABLET ORAL at 21:52

## 2018-08-03 RX ADMIN — SENNOSIDES 8.6 MG: 8.6 TABLET, FILM COATED ORAL at 21:52

## 2018-08-03 RX ADMIN — METRONIDAZOLE 500 MG: 500 TABLET ORAL at 13:52

## 2018-08-03 RX ADMIN — EPHEDRINE SULFATE 5 MG: 50 INJECTION, SOLUTION INTRAMUSCULAR; INTRAVENOUS; SUBCUTANEOUS at 15:55

## 2018-08-03 RX ADMIN — POLYETHYLENE GLYCOL 3350 17 G: 17 POWDER, FOR SOLUTION ORAL at 09:51

## 2018-08-03 RX ADMIN — PROPOFOL 200 MG: 10 INJECTION, EMULSION INTRAVENOUS at 15:49

## 2018-08-03 RX ADMIN — SODIUM CHLORIDE, SODIUM LACTATE, POTASSIUM CHLORIDE, AND CALCIUM CHLORIDE: .6; .31; .03; .02 INJECTION, SOLUTION INTRAVENOUS at 15:53

## 2018-08-03 RX ADMIN — DEXTROSE MONOHYDRATE: 50 INJECTION, SOLUTION INTRAVENOUS at 15:41

## 2018-08-03 RX ADMIN — ROCURONIUM BROMIDE 30 MG: 10 INJECTION INTRAVENOUS at 16:10

## 2018-08-03 RX ADMIN — SODIUM CHLORIDE: 0.9 INJECTION, SOLUTION INTRAVENOUS at 16:33

## 2018-08-03 RX ADMIN — CHLORHEXIDINE GLUCONATE 15 ML: 1.2 RINSE ORAL at 09:49

## 2018-08-03 RX ADMIN — MIDAZOLAM 2 MG: 1 INJECTION INTRAMUSCULAR; INTRAVENOUS at 15:41

## 2018-08-03 RX ADMIN — ONDANSETRON 4 MG: 2 INJECTION INTRAMUSCULAR; INTRAVENOUS at 16:43

## 2018-08-03 RX ADMIN — FUROSEMIDE 20 MG: 20 TABLET ORAL at 09:50

## 2018-08-03 RX ADMIN — MIDODRINE HYDROCHLORIDE 5 MG: 5 TABLET ORAL at 09:50

## 2018-08-03 RX ADMIN — SUCCINYLCHOLINE CHLORIDE 100 MG: 20 INJECTION, SOLUTION INTRAMUSCULAR; INTRAVENOUS at 15:49

## 2018-08-03 RX ADMIN — DOCUSATE SODIUM 100 MG: 100 CAPSULE, LIQUID FILLED ORAL at 09:51

## 2018-08-03 RX ADMIN — HYDROMORPHONE HYDROCHLORIDE: 10 INJECTION, SOLUTION INTRAMUSCULAR; INTRAVENOUS; SUBCUTANEOUS at 19:15

## 2018-08-03 RX ADMIN — METOCLOPRAMIDE 10 MG: 5 INJECTION, SOLUTION INTRAMUSCULAR; INTRAVENOUS at 16:44

## 2018-08-03 RX ADMIN — ALBUMIN (HUMAN): 12.5 SOLUTION INTRAVENOUS at 16:34

## 2018-08-03 RX ADMIN — PHENYLEPHRINE HYDROCHLORIDE 10 MCG/MIN: 10 INJECTION INTRAVENOUS at 16:34

## 2018-08-03 RX ADMIN — Medication 325 MG: at 09:50

## 2018-08-03 RX ADMIN — CEFAZOLIN SODIUM 2000 MG: 2 SOLUTION INTRAVENOUS at 09:54

## 2018-08-03 RX ADMIN — LIDOCAINE HYDROCHLORIDE 50 MG: 10 INJECTION, SOLUTION INFILTRATION; PERINEURAL at 15:49

## 2018-08-03 RX ADMIN — METOCLOPRAMIDE HYDROCHLORIDE 5 MG: 10 TABLET ORAL at 09:49

## 2018-08-03 RX ADMIN — METRONIDAZOLE 500 MG: 500 TABLET ORAL at 05:35

## 2018-08-03 RX ADMIN — SODIUM CHLORIDE 125 ML/HR: 0.9 INJECTION, SOLUTION INTRAVENOUS at 18:50

## 2018-08-03 RX ADMIN — FENTANYL CITRATE 100 MCG: 50 INJECTION, SOLUTION INTRAMUSCULAR; INTRAVENOUS at 15:49

## 2018-08-03 RX ADMIN — FLUDROCORTISONE ACETATE 0.1 MG: 0.1 TABLET ORAL at 09:51

## 2018-08-03 NOTE — NUTRITION
08/03/18 1722   Recommendations/Interventions   Nutrition Recommendations Other (specify)  (Once pt able to tolerate po diet progression post op, suggest CCD3 diet - and consider adding Sharp Gaurav BID and Kali Glucerna BID post op  )

## 2018-08-03 NOTE — PROGRESS NOTES
Progress Note - Teetee Burt 1960, 60 y o  male MRN: [id number]    Unit/Bed#: OR POOL Encounter: [id number]    Primary Care Provider: Magali Camara DO   Date and time admitted to hospital: 7/26/2018  5:03 PM        Obstructive sleep apnea   Assessment & Plan    CPAP noncompliant        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)   Assessment & Plan    Lab Results   Component Value Date    HGBA1C 7 9 (H) 07/27/2018    Hemoglobin A1c for tomorrow  Check blood sugars before meals and bedtime  Insulin sliding scale  Recent Labs      08/02/18   2136  08/03/18   0541  08/03/18   0645  08/03/18   1058   POCGLU  140  116  109  94       Blood Sugar Average: Last 72 hrs:  (P) 135 5     Continue insulin, monitor Accu-Cheks  BG controlled   Lantus to 40 units b i d  Avoid hypokalemia         Other constipation   Assessment & Plan    Fleet enema-   Stool softeners  He reportedly has autonomic dysfunction which affects his bowels  Triple vessel coronary artery disease   Assessment & Plan    Continue Plavix statin        PAD (peripheral artery disease) ([facility])   Assessment & Plan    On statin Plavix        Benign essential HTN   Assessment & Plan    /64  Continue monitoring  Continue meds        * Non-healing wound of left heel   Assessment & Plan    BCx NGTD after 4 days  Local wound care  BKA per vascular surgery- today  IV antibiotics per Infectious Disease cefazolin/flagyl, pending surgical cure  F/u H/H postoperatively, if Hbn<7g/dL   Will need to transfuse PRBC                   VTE Pharmacologic Prophylaxis:   Pharmacologic: Enoxaparin (Lovenox)  Mechanical VTE Prophylaxis in Place: Yes    Patient Centered Rounds: I have performed bedside rounds with nursing staff today  Discussions with Specialists or Other Care Team Provider: yes    Education and Discussions with Family / Patient: yes  Time Spent for Care: 45 minutes    More than 50% of total time spent on counseling and coordination of care as described above  Current Length of Stay: 8 day(s)    Current Patient Status: Inpatient   Certification Statement: The patient will continue to require additional inpatient hospital stay due to [address] today    Discharge Plan: home when medically stable    Code Status: Level 1 - Full Code      Subjective:    awaiting BKA  Afebrile  Denied any chest pain, shortness of breath, changes in bowel urinary habits  Responded well to enema yesterday    Objective:     Vitals:   Temp (24hrs), Av 8 °F (36 6 °C), Min:97 6 °F (36 4 °C), Max:97 9 °F (36 6 °C)    HR:  [80-90] 80  Resp:  [18] 18  BP: ()/(59-80) 152/64  SpO2:  [96 %-98 %] 96 %  Body mass index is 33 74 kg/m²  Input and Output Summary (last 24 hours): Intake/Output Summary (Last 24 hours) at 18 1452  Last data filed at 18 1100   Gross per 24 hour   Intake              220 ml   Output             2450 ml   Net            -2230 ml       Physical Exam:     Physical Exam   Constitutional: He is oriented to person, place, and time  No distress  HENT:   Head: Normocephalic and atraumatic  Mouth/Throat: Oropharynx is clear and moist    Eyes: Conjunctivae and EOM are normal  Pupils are equal, round, and reactive to light  Neck: Normal range of motion  Neck supple  No JVD present  Cardiovascular: Normal rate and normal heart sounds  Exam reveals no gallop and no friction rub  No murmur heard  Pulmonary/Chest: Effort normal and breath sounds normal  No respiratory distress  He has no wheezes  He has no rales  Abdominal: Soft  Bowel sounds are normal  He exhibits no distension  There is no tenderness  There is no rebound and no guarding  Musculoskeletal: Normal range of motion  He exhibits deformity  He exhibits no edema or tenderness  Neurological: He is alert and oriented to person, place, and time  No cranial nerve deficit  Coordination normal    Skin: Skin is warm  No erythema  Psychiatric: His behavior is normal          Additional Data:     Labs:      Results from last 7 days  Lab Units 08/03/18  0505   WBC Thousand/uL 9 75   HEMOGLOBIN g/dL 11 0*   HEMATOCRIT % 36 0*   PLATELETS Thousands/uL 310   NEUTROS PCT % 70   LYMPHS PCT % 20   MONOS PCT % 6   EOS PCT % 3       Results from last 7 days  Lab Units 08/03/18  0505   SODIUM mmol/L 137   POTASSIUM mmol/L 3 9   CHLORIDE mmol/L 102   CO2 mmol/L 29   BUN mg/dL 12   CREATININE mg/dL 0 80   CALCIUM mg/dL 8 4   TOTAL PROTEIN g/dL 7 4   BILIRUBIN TOTAL mg/dL 0 23   ALK PHOS U/L 54   ALT U/L 14   AST U/L 24   GLUCOSE RANDOM mg/dL 114           Results from last 7 days  Lab Units 08/03/18  1058 08/03/18  0645 08/03/18  0541 08/02/18  2136 08/02/18  1606 08/02/18  1039 08/02/18  0659 08/01/18  2056 08/01/18  1613 08/01/18  1027 08/01/18  0746 08/01/18  0653   POC GLUCOSE mg/dl 94 109 116 140 227* 187* 125 136 160* 200* 82 76             * I Have Reviewed All Lab Data Listed Above  * Additional Pertinent Lab Tests Reviewed:  [address] Admission Reviewed    Imaging:    Imaging Reports Reviewed Today Recent Cultures (last 7 days):           Last 24 Hours Medication List:     Current Facility-Administered Medications:  [MAR Hold] acetaminophen 650 mg Oral Q6H PRN Gerald Rouse MD    [MAR Hold] aluminum-magnesium hydroxide-simethicone 15 mL Oral Q6H PRN Gerald Rouse MD    [facility] Hold] atorvastatin 80 mg Oral Daily With Mykel Phillips MD    [facility] Hold] bisacodyl 10 mg Rectal Daily PRN Mario Hinson MD    [facility] Hold] bisacodyl 10 mg Rectal Once Bebo Milligan PA-C    [facility] Hold] bromfenac sodium 1 drop Right Eye Daily Gerald Rouse MD    [facility] Hold] cefazolin 2,000 mg Intravenous Eddie Thomas MD Last Rate: Stopped (08/03/18 1030)   chlorhexidine 15 mL Swish & Spit Q12H [facility] Bebo Milligan PA-C    [MAR Hold] clopidogrel 75 mg Oral Daily Gerald Rouse MD    [facility] Hold] docusate sodium 100 mg Oral BID Thea Arevalo MD    [facility] Hold] enoxaparin 40 mg Subcutaneous Daily Thea Arevalo MD    [facility] Hold] ferrous sulfate 325 mg Oral BID With Meals Thea Arevalo MD    [facility] Hold] fludrocortisone 0 1 mg Oral Daily Thea Arevalo MD    [facility] Hold] furosemide 20 mg Oral Daily Thea Arevalo MD    [facility] Hold] hydrALAZINE 5 mg Intravenous Q6H PRN Pallavi Saldivar PA-C    [facility] Hold] insulin glargine 15 Units Subcutaneous Q12H [address] [place] [address] Hold] insulin lispro 1-5 Units Subcutaneous HS Thea Arevalo MD    [facility] Hold] insulin lispro 2-12 Units Subcutaneous TID [facility] Thea Arevalo MD    [facility] Hold] metoclopramide 5 mg Oral Daily Thea Arevalo MD    [facility] Hold] metroNIDAZOLE 500 mg Oral [facility] Lizzy Bush MD    [facility] Hold] midodrine 5 mg Oral TID With Meals Thea Arevalo MD    [facility] Hold] mineral oil 30 mL Oral Once Britney Rocha PA-C    [facility] Hold] polyethylene glycol 17 g Oral Daily Lizzy Bush MD    [facility] Hold] polyvinyl alcohol 1 drop Right Eye TID Hugo Rick PA-C    [facility] Hold] senna 1 tablet Oral HS Britney Rocha PA-C    [facility] Hold] sodium chloride (PF) 3 mL Intravenous PRN José Mcelroy DO    [facility] Hold] tamsulosin 0 4 mg Oral Daily With Rayray Butt MD         Today, Patient Was Seen By: Serenity Tracy MD    ** Please Note: Dictation voice to text software may have been used in the creation of this document   **

## 2018-08-03 NOTE — OP NOTE
OPERATIVE REPORT  PATIENT NAME: Ángel Santos    :  1960  MRN: [id number]  Pt Location: BE OR ROOM 07    SURGERY DATE: 8/3/2018    Surgeon(s) and Role:     * Patricia Ospina MD - Primary     * Latrell Godoy MD - Assisting    Preop Diagnosis:  PAD (peripheral artery disease) ([facility] [address] ) [I73 9]  Non-healing wound of left heel [S91 302A]    Post-Op Diagnosis Codes:     * PAD (peripheral artery disease) (HCC) [I73 9]     * Non-healing wound of left heel [S91 302A]    Procedure(s) (LRB):  AMPUTATION BELOW KNEE (BKA) L BKA (Left)    Specimen(s):  ID Type Source Tests Collected by Time Destination   1 :  Tissue Leg, Left TISSUE EXAM Patricia Ospina MD 8/3/2018 1619        Estimated Blood Loss:   1000 mL    Drains:  Closed/Suction Drain Left Leg Bulb 19 Fr  (Active)   Dressing Status Clean;Dry; Intact 8/3/2018  5:26 PM   Number of days: 0       Anesthesia Type:   General    Operative Indications:  PAD (peripheral artery disease) (HCC) [I73 9]  Non-healing wound of left heel [S91 302A]      Operative Findings:  Healthy bleeding at edges  Complications:   None    Procedure and Technique:  The procedure was performed under  General anesthesia  The patient was placed supine  The left lower extremity was prepped and draped in the usual sterile fashion  An occlusive dressing was applied to the foot up to the level of the ankle  A tourniquet was applied and Eschmarch bandage was used to   exanguinate the leg and then tourniquet was inflated to 300 mmHG  Anterior and posterior skin incisions were outlined with a  marking pen  The anterior skin incision was made 10 cm below  the tibial tuberosity and extended medially and laterally toward  the edges of the gastrocnemius muscle  The skin incision was then  extended distally on either side parallel to the tibia for 12  cm,  creating a posterior flap  There was severe venous bleeding from the cut skin edges    As the tourniquet was worsening the venous bleeding we deflated the tourniquet  The skin and subcutaneous tissues were  incised down to the fascia  The greater and short saphenous veins  on the medial and posterior aspects of the leg, respectively, were  ligated and divided  The fascia and the muscles were then divided  with the electrocautery at the same level of the anterior skin incision  The muscles in the anterior and lateral compartment were  divided, exposing the anterior tibial vessels, which were ligated  and divided  The interosseous membrane was then incised  The  tibial periosteum was incised circumferentially with the electrocautery  at the same level of the skin and muscle division  Using a  periosteal elevator, the tibial periosteum was stripped proximally  for 2 cm  The tibia was then transected with a electric saw  2 cm proximal to the skin incision with an anterior bevel  The fibula  was then exposed, dissected circumferentially, and transected  with a bone cutter 2 cm proximal to the tibial division  The amputation was then completed with an amputation knife,  transecting the soleus muscle obliquely and the gastrocnemius  muscle at the same level as the posterior flap  Bleeding soleal  veins and posterior tibial and peroneal vessels were clamped  and oversewn with 0 silk sutures  Sharp bony edges were then  filed, eliminating any bony prominences over the anterior aspect  of the tibia  The fascia of the anterior and posterior muscle flaps  were approximated with interrupted absorbable sutures  A #19 Jean Claude drain was   placed in the wound bed and secured to the skin with 3 0 nylon sutures  The skin  was approximated with interrupted 3-0 nylon sutures and skin staples  and dressed with 4 × 4 gauze, Kerlix, and an stockinet bandage and knee immobilizer    The patient tolerated the procedure well and was taken to the  postanesthesia care       I was present for the entire procedure    Patient Disposition:  PACU     SIGNATURE: Regina Haro MD  DATE: August 3, 2018  TIME: 6:45 PM

## 2018-08-03 NOTE — PLAN OF CARE
DISCHARGE PLANNING - CARE MANAGEMENT     Discharge to post-acute care or home with appropriate resources Progressing        MUSCULOSKELETAL - ADULT     Maintain or return mobility to safest level of function Progressing     Maintain proper alignment of affected body part Progressing        Nutrition/Hydration-ADULT     Nutrient/Hydration intake appropriate for improving, restoring or maintaining nutritional needs Progressing        Potential for Falls     Patient will remain free of falls Progressing        Prexisting or High Potential for Compromised Skin Integrity     Skin integrity is maintained or improved Progressing        SKIN/TISSUE INTEGRITY - ADULT     Skin integrity remains intact Progressing     Incision(s), wounds(s) or drain site(s) healing without S/S of infection Progressing     Oral mucous membranes remain intact Progressing

## 2018-08-03 NOTE — RESTORATIVE TECHNICIAN NOTE
Restorative Specialist Mobility Note       Activity: Ambulate in guardado, Ambulate in room, Bathroom privileges, Dangle, Stand at bedside (Educated/encouraged pt to ambulate with assistance 3-4 x's/day  Bed alarm on   Pt callbell, phone/tray within reach )     Assistive Device: Front wheel walker (L Darco shoe and R surgical shoe on)          Oscar MALAVE, Restorative Technician, [employer]

## 2018-08-03 NOTE — PERIOPERATIVE NURSING NOTE
VSS, pt denies pain or nausea, resting quietly, assessment unchanged, report called, no questions, pt transferred to floor via bed with continuous pulse oximetry and Meryle Prime

## 2018-08-03 NOTE — ANESTHESIA POSTPROCEDURE EVALUATION
Post-Op Assessment Note      CV Status:  Stable    Mental Status:  Alert and awake    Hydration Status:  Euvolemic    PONV Controlled:  Controlled    Airway Patency:  Patent    Post Op Vitals Reviewed: Yes          Staff: CRNA       Comments: awake and verbal          BP   99/52   Temp 98 7 °F (37 1 °C) (08/03/18 1825)    Pulse 85 (08/03/18 1825)   Resp 15 (08/03/18 1825)    SpO2 98 % (08/03/18 1825)

## 2018-08-03 NOTE — PROGRESS NOTES
Called by nursing pt npo for surgery later in day tomorrow  Pts blood sugars running on the low side     - pt took 20 units lantus last night (instead of his usual 40 units)   - fastin gthis am was 76   - so since plan later in day and blood sugar now 140  - will change 40 units to 15 units lantus now   - please resume post operatively

## 2018-08-03 NOTE — ASSESSMENT & PLAN NOTE
Lab Results   Component Value Date    HGBA1C 7 9 (H) 07/27/2018    Hemoglobin A1c for tomorrow  Check blood sugars before meals and bedtime  Insulin sliding scale  Recent Labs      08/02/18   2136  08/03/18   0541  08/03/18   0645  08/03/18   1058   POCGLU  140  116  109  94       Blood Sugar Average: Last 72 hrs:  (P) 135 5     Continue insulin, monitor Accu-Cheks  BG controlled   Lantus to 40 units b i d    Avoid hypokalemia

## 2018-08-03 NOTE — PROGRESS NOTES
Progress Note - Vascular Surgery   Luciano Mcgill 60 y o  male MRN: [id number]  [facility] Encounter: [id number]    Assessment:  61 y/o M w/ PAD, p/w nonsalvagable L foot wound    Plan:  --NPO since MN  --L BKA today with Dr Deronda Moritz  Abx per ID  --f/u AM labs  --Continue care per primary team    Subjective/Objective     Subjective:     No acute events overnight  Pt denies any complaints  Objective:     Blood pressure 153/69, pulse 90, temperature 97 6 °F (36 4 °C), temperature source Oral, resp  rate 18, height 6' 1" (1 854 m), weight 116 kg (255 lb 11 7 oz), SpO2 97 %  ,Body mass index is 33 74 kg/m²  Intake/Output Summary (Last 24 hours) at 08/03/18 0451  Last data filed at 08/03/18 0119   Gross per 24 hour   Intake              770 ml   Output             2650 ml   Net            -1880 ml       Invasive Devices     Peripheral Intravenous Line            Peripheral IV 08/01/18 Right Forearm 1 day                Physical Exam:     GEN: NAD  HEENT: MMM  CV: RRR  Lung: normal effort  Ab: Soft, NT/ND  Extrem: No CCE; LLE wrapped in ACE bandage; L foot in brace; nondopp L DP/PT; palp fem pulses b/l  Neuro:  A+Ox3, motor and sensation grossly intact      Lab, Imaging and other studies:  CBC:   Lab Results   Component Value Date    WBC 9 05 08/02/2018    HGB 11 1 (L) 08/02/2018    HCT 36 7 08/02/2018    MCV 82 08/02/2018     08/02/2018    MCH 24 9 (L) 08/02/2018    MCHC 30 2 (L) 08/02/2018    RDW 15 5 (H) 08/02/2018    MPV 9 5 08/02/2018    NRBC 0 08/02/2018   , CMP:   Lab Results   Component Value Date     08/02/2018    K 3 7 08/02/2018     08/02/2018    CO2 30 08/02/2018    ANIONGAP 4 08/02/2018    BUN 10 08/02/2018    CREATININE 0 76 08/02/2018    GLUCOSE 132 08/02/2018    CALCIUM 8 3 08/02/2018    AST 22 08/02/2018    ALT 13 08/02/2018    ALKPHOS 55 08/02/2018    PROT 7 3 08/02/2018    BILITOT 0 32 08/02/2018    EGFR 100 08/02/2018   , Coagulation: No results found for: PT, INR, APTT, Urinalysis: No results found for: COLORU, CLARITYU, SPECGRAV, PHUR, LEUKOCYTESUR, NITRITE, PROTEINUA, GLUCOSEU, KETONESU, BILIRUBINUR, BLOODU, Amylase: No results found for: AMYLASE, Lipase: No results found for: LIPASE  VTE Pharmacologic Prophylaxis: Enoxaparin (Lovenox)  VTE Mechanical Prophylaxis: sequential compression device

## 2018-08-03 NOTE — PROGRESS NOTES
Progress Note - Infectious Disease   Elias Sandoval 60 y o  male MRN: [id number]  Unit/Bed#: [facility] 724-01 Encounter: [id number]      Impression/Recommendations:  1   Left heel chronic diabetic ulcer with underlying osteomyelitis/superinfection  Consider GPC/GNRs/anaerobes  Foot deemed nonsalvageable per podiatry  Blood cultures negative  Clinically stable without sepsis  Rec:  ? Continue cefazolin/Flagyl for now  ? Await BKA tdoay which should offer definitive cure  ? D/C antibiotics postoperatively  ? Follow temperatures and WBC closely  ? Supportive care as per the primary service     2  DM with DPN  A1C 7 9  Rec:  ? Continue management as per the primary service     3   PAD             Status post recent Agram/TPA  Rec:  ? BKA today  ? Close vascular surgery follow-up ongoing     The patient is stable from an ID standpoint  I will reassess the patient on   Please call in the interim with new questions      Antibiotics:  Cefazolin #6  Flagyl #9  Antibiotics #9    Subjective:  Patient seen on AM rounds  Denies fevers, chills, sweats, nausea, vomiting, or diarrhea  24 Hour Events:  No documented fevers, chills, sweats, nausea, vomiting, or diarrhea  Objective:  Vitals:  HR:  [80-90] 80  Resp:  [18] 18  BP: ()/(59-80) 152/64  SpO2:  [96 %-98 %] 96 %  Temp (24hrs), Av 8 °F (36 6 °C), Min:97 6 °F (36 4 °C), Max:97 9 °F (36 6 °C)  Current: Temperature: 97 8 °F (36 6 °C)    Physical Exam:   General:  No acute distress  Psychiatric:  Awake and alert  Pulmonary:  Normal respiratory excursion without accessory muscle use  Abdomen:  Soft, nontender  Extremities:  Mild left leg edema with ACE wrap intact  Skin:  No rashes    Lab Results:  I have personally reviewed pertinent labs      Results from last 7 days  Lab Units 18  0505 18  0602 18  0449   SODIUM mmol/L 137 138 139   POTASSIUM mmol/L 3 9 3 7 3 8   CHLORIDE mmol/L 102 104 104   CO2 mmol/L 29 30 28   ANION GAP mmol/L 6 4 7 BUN mg/dL 12 10 10   CREATININE mg/dL 0 80 0 76 0 70   EGFR ml/min/1 73sq m 98 100 103   GLUCOSE RANDOM mg/dL 114 132 76   CALCIUM mg/dL 8 4 8 3 8 5   AST U/L 24 22 18   ALT U/L 14 13 13   ALK PHOS U/L 54 55 50   TOTAL PROTEIN g/dL 7 4 7 3 6 9   BILIRUBIN TOTAL mg/dL 0 23 0 32 0 28       Results from last 7 days  Lab Units 08/03/18  0505 08/02/18  0602 08/01/18  0449   WBC Thousand/uL 9 75 9 05 8 81   HEMOGLOBIN g/dL 11 0* 11 1* 10 9*   PLATELETS Thousands/uL 310 287 308           Imaging Studies:   I have personally reviewed pertinent imaging study reports and images in PACS  EKG, Pathology, and Other Studies:   I have personally reviewed pertinent reports

## 2018-08-03 NOTE — ASSESSMENT & PLAN NOTE
BCx NGTD after 4 days  Local wound care  BKA per vascular surgery- today  IV antibiotics per Infectious Disease cefazolin/flagyl, pending surgical cure

## 2018-08-04 PROBLEM — Z89.511 S/P BILATERAL BKA (BELOW KNEE AMPUTATION) (HCC): Status: ACTIVE | Noted: 2018-03-14

## 2018-08-04 PROBLEM — Z89.512 S/P BILATERAL BKA (BELOW KNEE AMPUTATION) (HCC): Status: ACTIVE | Noted: 2018-03-14

## 2018-08-04 LAB
ABO GROUP BLD BPU: NORMAL
ABO GROUP BLD BPU: NORMAL
ALBUMIN SERPL BCP-MCNC: 2.4 G/DL (ref 3.5–5)
ALP SERPL-CCNC: 42 U/L (ref 46–116)
ALT SERPL W P-5'-P-CCNC: 13 U/L (ref 12–78)
ANION GAP SERPL CALCULATED.3IONS-SCNC: 6 MMOL/L (ref 4–13)
AST SERPL W P-5'-P-CCNC: 28 U/L (ref 5–45)
BASOPHILS # BLD AUTO: 0.04 THOUSANDS/ΜL (ref 0–0.1)
BASOPHILS NFR BLD AUTO: 0 % (ref 0–1)
BILIRUB SERPL-MCNC: 0.67 MG/DL (ref 0.2–1)
BPU ID: NORMAL
BPU ID: NORMAL
BUN SERPL-MCNC: 12 MG/DL (ref 5–25)
CALCIUM SERPL-MCNC: 7.8 MG/DL (ref 8.3–10.1)
CHLORIDE SERPL-SCNC: 105 MMOL/L (ref 100–108)
CO2 SERPL-SCNC: 28 MMOL/L (ref 21–32)
CREAT SERPL-MCNC: 0.73 MG/DL (ref 0.6–1.3)
EOSINOPHIL # BLD AUTO: 0.09 THOUSAND/ΜL (ref 0–0.61)
EOSINOPHIL NFR BLD AUTO: 1 % (ref 0–6)
ERYTHROCYTE [DISTWIDTH] IN BLOOD BY AUTOMATED COUNT: 15.8 % (ref 11.6–15.1)
GFR SERPL CREATININE-BSD FRML MDRD: 102 ML/MIN/1.73SQ M
GLUCOSE SERPL-MCNC: 114 MG/DL (ref 65–140)
GLUCOSE SERPL-MCNC: 127 MG/DL (ref 65–140)
GLUCOSE SERPL-MCNC: 137 MG/DL (ref 65–140)
GLUCOSE SERPL-MCNC: 138 MG/DL (ref 65–140)
GLUCOSE SERPL-MCNC: 139 MG/DL (ref 65–140)
GLUCOSE SERPL-MCNC: 88 MG/DL (ref 65–140)
HCT VFR BLD AUTO: 33.3 % (ref 36.5–49.3)
HGB BLD-MCNC: 10.1 G/DL (ref 12–17)
IMM GRANULOCYTES # BLD AUTO: 0.04 THOUSAND/UL (ref 0–0.2)
IMM GRANULOCYTES NFR BLD AUTO: 0 % (ref 0–2)
LYMPHOCYTES # BLD AUTO: 1.51 THOUSANDS/ΜL (ref 0.6–4.47)
LYMPHOCYTES NFR BLD AUTO: 15 % (ref 14–44)
MCH RBC QN AUTO: 25.7 PG (ref 26.8–34.3)
MCHC RBC AUTO-ENTMCNC: 30.3 G/DL (ref 31.4–37.4)
MCV RBC AUTO: 85 FL (ref 82–98)
MONOCYTES # BLD AUTO: 0.8 THOUSAND/ΜL (ref 0.17–1.22)
MONOCYTES NFR BLD AUTO: 8 % (ref 4–12)
NEUTROPHILS # BLD AUTO: 7.62 THOUSANDS/ΜL (ref 1.85–7.62)
NEUTS SEG NFR BLD AUTO: 76 % (ref 43–75)
NRBC BLD AUTO-RTO: 0 /100 WBCS
PLATELET # BLD AUTO: 238 THOUSANDS/UL (ref 149–390)
PMV BLD AUTO: 9.8 FL (ref 8.9–12.7)
POTASSIUM SERPL-SCNC: 4 MMOL/L (ref 3.5–5.3)
PROT SERPL-MCNC: 6.4 G/DL (ref 6.4–8.2)
RBC # BLD AUTO: 3.93 MILLION/UL (ref 3.88–5.62)
SODIUM SERPL-SCNC: 139 MMOL/L (ref 136–145)
UNIT DISPENSE STATUS: NORMAL
UNIT DISPENSE STATUS: NORMAL
UNIT PRODUCT CODE: NORMAL
UNIT PRODUCT CODE: NORMAL
UNIT RH: NORMAL
UNIT RH: NORMAL
WBC # BLD AUTO: 10.1 THOUSAND/UL (ref 4.31–10.16)

## 2018-08-04 PROCEDURE — 82948 REAGENT STRIP/BLOOD GLUCOSE: CPT

## 2018-08-04 PROCEDURE — 99024 POSTOP FOLLOW-UP VISIT: CPT | Performed by: SURGERY

## 2018-08-04 PROCEDURE — 94762 N-INVAS EAR/PLS OXIMTRY CONT: CPT

## 2018-08-04 PROCEDURE — 99232 SBSQ HOSP IP/OBS MODERATE 35: CPT | Performed by: HOSPITALIST

## 2018-08-04 PROCEDURE — 85025 COMPLETE CBC W/AUTO DIFF WBC: CPT | Performed by: HOSPITALIST

## 2018-08-04 PROCEDURE — 80053 COMPREHEN METABOLIC PANEL: CPT | Performed by: HOSPITALIST

## 2018-08-04 RX ORDER — ONDANSETRON 2 MG/ML
4 INJECTION INTRAMUSCULAR; INTRAVENOUS EVERY 6 HOURS PRN
Status: DISCONTINUED | OUTPATIENT
Start: 2018-08-04 | End: 2018-08-09 | Stop reason: HOSPADM

## 2018-08-04 RX ORDER — CALCIUM CARBONATE 200(500)MG
1000 TABLET,CHEWABLE ORAL DAILY PRN
Status: DISCONTINUED | OUTPATIENT
Start: 2018-08-04 | End: 2018-08-09 | Stop reason: HOSPADM

## 2018-08-04 RX ORDER — CALCIUM CARBONATE 200(500)MG
500 TABLET,CHEWABLE ORAL DAILY PRN
Status: DISCONTINUED | OUTPATIENT
Start: 2018-08-04 | End: 2018-08-04

## 2018-08-04 RX ORDER — METOCLOPRAMIDE HYDROCHLORIDE 5 MG/ML
10 INJECTION INTRAMUSCULAR; INTRAVENOUS EVERY 6 HOURS PRN
Status: DISCONTINUED | OUTPATIENT
Start: 2018-08-04 | End: 2018-08-09 | Stop reason: HOSPADM

## 2018-08-04 RX ADMIN — FLUDROCORTISONE ACETATE 0.1 MG: 0.1 TABLET ORAL at 09:29

## 2018-08-04 RX ADMIN — DOCUSATE SODIUM 100 MG: 100 CAPSULE, LIQUID FILLED ORAL at 09:28

## 2018-08-04 RX ADMIN — POLYVINYL ALCOHOL 1 DROP: 14 SOLUTION/ DROPS OPHTHALMIC at 17:27

## 2018-08-04 RX ADMIN — FUROSEMIDE 20 MG: 20 TABLET ORAL at 09:28

## 2018-08-04 RX ADMIN — SENNOSIDES 8.6 MG: 8.6 TABLET, FILM COATED ORAL at 20:47

## 2018-08-04 RX ADMIN — POLYVINYL ALCOHOL 1 DROP: 14 SOLUTION/ DROPS OPHTHALMIC at 20:48

## 2018-08-04 RX ADMIN — INSULIN GLARGINE 15 UNITS: 100 INJECTION, SOLUTION SUBCUTANEOUS at 09:29

## 2018-08-04 RX ADMIN — POLYVINYL ALCOHOL 1 DROP: 14 SOLUTION/ DROPS OPHTHALMIC at 09:29

## 2018-08-04 RX ADMIN — ALUMINUM HYDROXIDE, MAGNESIUM HYDROXIDE, AND SIMETHICONE 15 ML: 200; 200; 20 SUSPENSION ORAL at 20:41

## 2018-08-04 RX ADMIN — METOCLOPRAMIDE HYDROCHLORIDE 5 MG: 10 TABLET ORAL at 09:28

## 2018-08-04 RX ADMIN — MIDODRINE HYDROCHLORIDE 5 MG: 5 TABLET ORAL at 09:28

## 2018-08-04 RX ADMIN — ATORVASTATIN CALCIUM 80 MG: 80 TABLET, FILM COATED ORAL at 17:23

## 2018-08-04 RX ADMIN — ONDANSETRON 4 MG: 2 INJECTION, SOLUTION INTRAMUSCULAR; INTRAVENOUS at 11:17

## 2018-08-04 RX ADMIN — ENOXAPARIN SODIUM 40 MG: 100 INJECTION SUBCUTANEOUS at 09:27

## 2018-08-04 RX ADMIN — SODIUM CHLORIDE 125 ML/HR: 0.9 INJECTION, SOLUTION INTRAVENOUS at 09:37

## 2018-08-04 RX ADMIN — Medication 325 MG: at 17:23

## 2018-08-04 RX ADMIN — CHLORHEXIDINE GLUCONATE 15 ML: 1.2 RINSE ORAL at 09:27

## 2018-08-04 RX ADMIN — SODIUM CHLORIDE 125 ML/HR: 0.9 INJECTION, SOLUTION INTRAVENOUS at 17:22

## 2018-08-04 RX ADMIN — MIDODRINE HYDROCHLORIDE 5 MG: 5 TABLET ORAL at 17:23

## 2018-08-04 RX ADMIN — INSULIN GLARGINE 15 UNITS: 100 INJECTION, SOLUTION SUBCUTANEOUS at 20:47

## 2018-08-04 RX ADMIN — CLOPIDOGREL BISULFATE 75 MG: 75 TABLET, FILM COATED ORAL at 09:28

## 2018-08-04 RX ADMIN — CHLORHEXIDINE GLUCONATE 15 ML: 1.2 RINSE ORAL at 20:47

## 2018-08-04 RX ADMIN — ONDANSETRON 4 MG: 2 INJECTION, SOLUTION INTRAMUSCULAR; INTRAVENOUS at 17:21

## 2018-08-04 RX ADMIN — SODIUM CHLORIDE 125 ML/HR: 0.9 INJECTION, SOLUTION INTRAVENOUS at 01:38

## 2018-08-04 RX ADMIN — TAMSULOSIN HYDROCHLORIDE 0.4 MG: 0.4 CAPSULE ORAL at 17:23

## 2018-08-04 RX ADMIN — DOCUSATE SODIUM 100 MG: 100 CAPSULE, LIQUID FILLED ORAL at 17:23

## 2018-08-04 RX ADMIN — MIDODRINE HYDROCHLORIDE 5 MG: 5 TABLET ORAL at 12:53

## 2018-08-04 RX ADMIN — Medication 325 MG: at 09:28

## 2018-08-04 RX ADMIN — Medication 500 MG: at 22:34

## 2018-08-04 RX ADMIN — METOCLOPRAMIDE 10 MG: 5 INJECTION, SOLUTION INTRAMUSCULAR; INTRAVENOUS at 19:38

## 2018-08-04 NOTE — PROGRESS NOTES
Progress Note - Jada Wagoner 1960, 60 y o  male MRN: [id number]    [facility]: [id number]    Primary Care Provider: Nicolás Garcia DO   Date and time admitted to hospital: 7/26/2018  5:03 PM        Obstructive sleep apnea   Assessment & Plan    CPAP noncompliant        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)   Assessment & Plan    Lab Results   Component Value Date    HGBA1C 7 9 (H) 07/27/2018    Hemoglobin A1c for tomorrow  Check blood sugars before meals and bedtime  Insulin sliding scale  Recent Labs      08/03/18   1834  08/03/18   2129  08/04/18   0636  08/04/18   1104   POCGLU  124  138  127  137       Blood Sugar Average: Last 72 hrs:  (P) 130 [id number]     Continue insulin, monitor Accu-Cheks  BG controlled   Lantus to 40 units b i d  Avoid hypokalemia         Other constipation   Assessment & Plan    Fleet enema-   Stool softeners  He reportedly has autonomic dysfunction which affects his bowels  Triple vessel coronary artery disease   Assessment & Plan    Continue Plavix statin        PAD (peripheral artery disease) ([facility])   Assessment & Plan    On statin Plavix        Benign essential HTN   Assessment & Plan    /65  Continue monitoring  Continue meds        * S/P bilateral BKA (below knee amputation) ([facility])   Assessment & Plan    BCx NGTD after 4 days  Local wound care  POD 1  BKA per vascular surgery-8/3/18  Zofran p r n  nausea   Appreciate ID recommendations- no further Abx needed                  VTE Pharmacologic Prophylaxis:   Pharmacologic: Enoxaparin (Lovenox)  Mechanical VTE Prophylaxis in Place: Yes    Patient Centered Rounds: I have performed bedside rounds with nursing staff today  Discussions with Specialists or Other Care Team Provider: yes    Education and Discussions with Family / Patient: yes    Time Spent for Care: 45 minutes    More than 50% of total time spent on counseling and coordination of care as described above  Current Length of Stay: 9 day(s)    Current Patient Status: Inpatient   Certification Statement: The patient will continue to require additional inpatient hospital stay due to med mgt POD 1 from Left BKA    Discharge Plan: home when medically stable, REHAB    Code Status: Level 1 - Full Code      Subjective:   Nausea today  Postop day 1  Review of systems negative otherwise    Objective:     Vitals:   Temp (24hrs), Av 1 °F (36 7 °C), Min:97 3 °F (36 3 °C), Max:99 °F (37 2 °C)    HR:  [71-88] 86  Resp:  [11-16] 16  BP: ()/(52-89) 146/65  SpO2:  [95 %-100 %] 98 %  Body mass index is 33 74 kg/m²  Input and Output Summary (last 24 hours): Intake/Output Summary (Last 24 hours) at 18 1259  Last data filed at 18 1101   Gross per 24 hour   Intake          5118 75 ml   Output             2560 ml   Net          2558 75 ml       Physical Exam:     Physical Exam   Constitutional: He is oriented to person, place, and time  No distress  HENT:   Head: Normocephalic and atraumatic  Mouth/Throat: Oropharynx is clear and moist    Eyes: Conjunctivae and EOM are normal  Pupils are equal, round, and reactive to light  No scleral icterus  Neck: Normal range of motion  Neck supple  No JVD present  Cardiovascular: Normal rate and normal heart sounds  Exam reveals no gallop and no friction rub  No murmur heard  Pulmonary/Chest: Effort normal and breath sounds normal  No respiratory distress  He has no wheezes  He has no rales  He exhibits no tenderness  Abdominal: Soft  Bowel sounds are normal  He exhibits no distension  There is no tenderness  There is no rebound and no guarding  Musculoskeletal: He exhibits deformity  Left BKA   Neurological: He is alert and oriented to person, place, and time  No cranial nerve deficit  Skin: Skin is warm  Psychiatric: He has a normal mood and affect   His behavior is normal  Judgment and thought content normal        Additional Data:     Labs:      Results from last 7 days  Lab Units 08/04/18  0553   WBC Thousand/uL 10 10   HEMOGLOBIN g/dL 10 1*   HEMATOCRIT % 33 3*   PLATELETS Thousands/uL 238   NEUTROS PCT % 76*   LYMPHS PCT % 15   MONOS PCT % 8   EOS PCT % 1       Results from last 7 days  Lab Units 08/04/18  0553   SODIUM mmol/L 139   POTASSIUM mmol/L 4 0   CHLORIDE mmol/L 105   CO2 mmol/L 28   BUN mg/dL 12   CREATININE mg/dL 0 73   CALCIUM mg/dL 7 8*   TOTAL PROTEIN g/dL 6 4   BILIRUBIN TOTAL mg/dL 0 67   ALK PHOS U/L 42*   ALT U/L 13   AST U/L 28   GLUCOSE RANDOM mg/dL 114           Results from last 7 days  Lab Units 08/04/18  1104 08/04/18  0636 08/03/18  2129 08/03/18  1834 08/03/18  1624 08/03/18  1527 08/03/18  1058 08/03/18  0645 08/03/18  0541 08/02/18  2136 08/02/18  1606 08/02/18  1039   POC GLUCOSE mg/dl 137 127 138 124 88 85 94 109 116 140 227* 187*             * I Have Reviewed All Lab Data Listed Above  * Additional Pertinent Lab Tests Reviewed:  [address] Admission Reviewed    Imaging:    Imaging Reports Reviewed Today     Recent Cultures (last 7 days):           Last 24 Hours Medication List:     Current Facility-Administered Medications:  acetaminophen 650 mg Oral Q6H PRN Tacos Varner MD    aluminum-magnesium hydroxide-simethicone 15 mL Oral Q6H PRN Tacos Varner MD    atorvastatin 80 mg Oral Daily With Grace Rodriguez MD    bisacodyl 10 mg Rectal Daily PRN Malathi Tay MD    bisacodyl 10 mg Rectal Once Dino Cho, PA-C    bromfenac sodium 1 drop Right Eye Daily Tacos Varner MD    chlorhexidine 15 mL Swish & Spit Q12H [address] Dino Cho, PA-C    clopidogrel 75 mg Oral Daily Tacos Varner MD    docusate sodium 100 mg Oral BID Tacos Varner MD    enoxaparin 40 mg Subcutaneous Daily Jose Cruz Zuñiga MD    ferrous sulfate 325 mg Oral BID With Meals Tacos Varner MD    fludrocortisone 0 1 mg Oral Daily Artemio Marlin Escoto MD    furosemide 20 mg Oral Daily Carlos Eduardo Calderon MD    hydrALAZINE 5 mg Intravenous Q6H PRN Geovanni Spain PA-C    HYDROmorphone  Intravenous Continuous Mariya Sutherland MD    insulin glargine 15 Units Subcutaneous Q12H [facility] Tereza BowlegsCARLTON    insulin lispro 1-5 Units Subcutaneous HS Carlos Eduardo Calderon MD    insulin lispro 2-12 Units Subcutaneous TID AC Carlos Eduardo Calderon MD    metoclopramide 5 mg Oral Daily Carlos Eduardo Calderon MD    midodrine 5 mg Oral TID With Meals Carlos Eduardo Calderon MD    mineral oil 30 mL Oral Once Eleonora Peck PA-C    ondansetron 4 mg Intravenous Q6H PRN Magi Sylvester MD    polyethylene glycol 17 g Oral Daily Marcelo Banks MD    polyvinyl alcohol 1 drop Right Eye TID Diane Velez PA-C    senna 1 tablet Oral HS Dax Puga PA-C    sodium chloride (PF) 3 mL Intravenous PRN Meg Marcus DO    sodium chloride 125 mL/hr Intravenous Continuous Shae Sellers MD Last Rate: 125 mL/hr (08/04/18 6707)   tamsulosin 0 4 mg Oral Daily With Jerilyn Sanders MD         Today, Patient Was Seen By: Magi Sylvester MD    ** Please Note: Dictation voice to text software may have been used in the creation of this document   **

## 2018-08-04 NOTE — ASSESSMENT & PLAN NOTE
BCx NGTD after 4 days  Local wound care  BKA per vascular surgery-8/3/18  Zofran p r n  nausea   Appreciate ID recommendations

## 2018-08-04 NOTE — PROGRESS NOTES
Progress Note - Vascular Surgery   Jefferson Castro 60 y o  male MRN: [id number]  Unit/Bed#: [facility] 724-01 Encounter: [id number]    Assessment:  59 y/o M w/ PAD, p/w nonsalvagable L foot wound s/p L BKA    Plan:  --Regular diet  --Resume lovenox, POD1  --Pain control, wean PCA   --f/u AM labs  --Continue care per primary team    Subjective/Objective     Subjective:     No acute events overnight  Patient states his pain is well controlled  He is having some nausea without emesis  I advised the patient that he has anti-emetics available  Denies BM/flatus  Denies f/c/v/d  Objective:     Blood pressure (!) 184/89, pulse 85, temperature (!) 97 3 °F (36 3 °C), temperature source Oral, resp  rate 16, height 6' 1" (1 854 m), weight 116 kg (255 lb 11 7 oz), SpO2 97 %  ,Body mass index is 33 74 kg/m²        Intake/Output Summary (Last 24 hours) at 08/04/18 0701  Last data filed at 08/04/18 0136   Gross per 24 hour   Intake          3995 83 ml   Output             1710 ml   Net          2285 83 ml       Invasive Devices     Peripheral Intravenous Line            Peripheral IV 08/01/18 Right Forearm 2 days    Peripheral IV 08/03/18 Left Hand less than 1 day    Peripheral IV 08/03/18 Right Wrist less than 1 day          Drain            Closed/Suction Drain Left Leg Bulb 19 Fr  less than 1 day                Physical Exam:     GEN: NAD  HEENT: MMM  CV: RRR  Lung: normal effort  Ab: Soft, NT/ND  Extrem: No CCE; LLE wrapped c/d/i; palp fem pulses b/l  Neuro: AAOx3, motor and sensation grossly intact      Lab, Imaging and other studies:  CBC:   Lab Results   Component Value Date    WBC 10 10 08/04/2018    HGB 10 1 (L) 08/04/2018    HCT 33 3 (L) 08/04/2018    MCV 85 08/04/2018     08/04/2018    MCH 25 7 (L) 08/04/2018    MCHC 30 3 (L) 08/04/2018    RDW 15 8 (H) 08/04/2018    MPV 9 8 08/04/2018    NRBC 0 08/04/2018   , CMP:   Lab Results   Component Value Date     08/03/2018    K 3 9 08/03/2018     08/03/2018    CO2 26 08/03/2018    ANIONGAP 7 08/03/2018    BUN 10 08/03/2018    CREATININE 0 79 08/03/2018    GLUCOSE 124 08/03/2018    CALCIUM 8 0 (L) 08/03/2018    EGFR 98 08/03/2018     VTE Pharmacologic Prophylaxis:   VTE Mechanical Prophylaxis: sequential compression device

## 2018-08-04 NOTE — ASSESSMENT & PLAN NOTE
Lab Results   Component Value Date    HGBA1C 7 9 (H) 07/27/2018    Hemoglobin A1c for tomorrow  Check blood sugars before meals and bedtime  Insulin sliding scale  Recent Labs      08/03/18   1834  08/03/18   2129  08/04/18   0636  08/04/18   1104   POCGLU  124  138  127  137       Blood Sugar Average: Last 72 hrs:  (P) 130 [id number]     Continue insulin, monitor Accu-Cheks  BG controlled   Lantus to 40 units b i d    Avoid hypokalemia

## 2018-08-05 LAB
ALBUMIN SERPL BCP-MCNC: 2.3 G/DL (ref 3.5–5)
ALP SERPL-CCNC: 41 U/L (ref 46–116)
ALT SERPL W P-5'-P-CCNC: 10 U/L (ref 12–78)
ANION GAP SERPL CALCULATED.3IONS-SCNC: 3 MMOL/L (ref 4–13)
ANION GAP SERPL CALCULATED.3IONS-SCNC: 5 MMOL/L (ref 4–13)
AST SERPL W P-5'-P-CCNC: 19 U/L (ref 5–45)
BASOPHILS # BLD AUTO: 0.04 THOUSANDS/ΜL (ref 0–0.1)
BASOPHILS NFR BLD AUTO: 0 % (ref 0–1)
BILIRUB SERPL-MCNC: 0.51 MG/DL (ref 0.2–1)
BUN SERPL-MCNC: 8 MG/DL (ref 5–25)
BUN SERPL-MCNC: 9 MG/DL (ref 5–25)
CALCIUM SERPL-MCNC: 7.7 MG/DL (ref 8.3–10.1)
CALCIUM SERPL-MCNC: 7.9 MG/DL (ref 8.3–10.1)
CHLORIDE SERPL-SCNC: 104 MMOL/L (ref 100–108)
CHLORIDE SERPL-SCNC: 105 MMOL/L (ref 100–108)
CO2 SERPL-SCNC: 29 MMOL/L (ref 21–32)
CO2 SERPL-SCNC: 30 MMOL/L (ref 21–32)
CREAT SERPL-MCNC: 0.61 MG/DL (ref 0.6–1.3)
CREAT SERPL-MCNC: 0.72 MG/DL (ref 0.6–1.3)
EOSINOPHIL # BLD AUTO: 0.06 THOUSAND/ΜL (ref 0–0.61)
EOSINOPHIL NFR BLD AUTO: 1 % (ref 0–6)
ERYTHROCYTE [DISTWIDTH] IN BLOOD BY AUTOMATED COUNT: 16 % (ref 11.6–15.1)
ERYTHROCYTE [DISTWIDTH] IN BLOOD BY AUTOMATED COUNT: 16.1 % (ref 11.6–15.1)
GFR SERPL CREATININE-BSD FRML MDRD: 102 ML/MIN/1.73SQ M
GFR SERPL CREATININE-BSD FRML MDRD: 109 ML/MIN/1.73SQ M
GLUCOSE SERPL-MCNC: 128 MG/DL (ref 65–140)
GLUCOSE SERPL-MCNC: 137 MG/DL (ref 65–140)
GLUCOSE SERPL-MCNC: 137 MG/DL (ref 65–140)
GLUCOSE SERPL-MCNC: 150 MG/DL (ref 65–140)
GLUCOSE SERPL-MCNC: 154 MG/DL (ref 65–140)
GLUCOSE SERPL-MCNC: 155 MG/DL (ref 65–140)
GLUCOSE SERPL-MCNC: 167 MG/DL (ref 65–140)
HCT VFR BLD AUTO: 30.2 % (ref 36.5–49.3)
HCT VFR BLD AUTO: 30.3 % (ref 36.5–49.3)
HGB BLD-MCNC: 9.4 G/DL (ref 12–17)
HGB BLD-MCNC: 9.5 G/DL (ref 12–17)
IMM GRANULOCYTES # BLD AUTO: 0.03 THOUSAND/UL (ref 0–0.2)
IMM GRANULOCYTES NFR BLD AUTO: 0 % (ref 0–2)
LYMPHOCYTES # BLD AUTO: 0.97 THOUSANDS/ΜL (ref 0.6–4.47)
LYMPHOCYTES NFR BLD AUTO: 10 % (ref 14–44)
MAGNESIUM SERPL-MCNC: 1.8 MG/DL (ref 1.6–2.6)
MCH RBC QN AUTO: 26.5 PG (ref 26.8–34.3)
MCH RBC QN AUTO: 26.6 PG (ref 26.8–34.3)
MCHC RBC AUTO-ENTMCNC: 31.1 G/DL (ref 31.4–37.4)
MCHC RBC AUTO-ENTMCNC: 31.4 G/DL (ref 31.4–37.4)
MCV RBC AUTO: 85 FL (ref 82–98)
MCV RBC AUTO: 85 FL (ref 82–98)
MONOCYTES # BLD AUTO: 0.87 THOUSAND/ΜL (ref 0.17–1.22)
MONOCYTES NFR BLD AUTO: 9 % (ref 4–12)
NEUTROPHILS # BLD AUTO: 7.72 THOUSANDS/ΜL (ref 1.85–7.62)
NEUTS SEG NFR BLD AUTO: 80 % (ref 43–75)
NRBC BLD AUTO-RTO: 0 /100 WBCS
PLATELET # BLD AUTO: 197 THOUSANDS/UL (ref 149–390)
PLATELET # BLD AUTO: 216 THOUSANDS/UL (ref 149–390)
PMV BLD AUTO: 10 FL (ref 8.9–12.7)
PMV BLD AUTO: 9.9 FL (ref 8.9–12.7)
POTASSIUM SERPL-SCNC: 3.7 MMOL/L (ref 3.5–5.3)
POTASSIUM SERPL-SCNC: 3.7 MMOL/L (ref 3.5–5.3)
PROT SERPL-MCNC: 6.1 G/DL (ref 6.4–8.2)
RBC # BLD AUTO: 3.55 MILLION/UL (ref 3.88–5.62)
RBC # BLD AUTO: 3.57 MILLION/UL (ref 3.88–5.62)
SODIUM SERPL-SCNC: 137 MMOL/L (ref 136–145)
SODIUM SERPL-SCNC: 139 MMOL/L (ref 136–145)
WBC # BLD AUTO: 9.38 THOUSAND/UL (ref 4.31–10.16)
WBC # BLD AUTO: 9.69 THOUSAND/UL (ref 4.31–10.16)

## 2018-08-05 PROCEDURE — 80048 BASIC METABOLIC PNL TOTAL CA: CPT | Performed by: PHYSICIAN ASSISTANT

## 2018-08-05 PROCEDURE — 83735 ASSAY OF MAGNESIUM: CPT | Performed by: PHYSICIAN ASSISTANT

## 2018-08-05 PROCEDURE — 80053 COMPREHEN METABOLIC PANEL: CPT | Performed by: HOSPITALIST

## 2018-08-05 PROCEDURE — 85025 COMPLETE CBC W/AUTO DIFF WBC: CPT | Performed by: HOSPITALIST

## 2018-08-05 PROCEDURE — 82948 REAGENT STRIP/BLOOD GLUCOSE: CPT

## 2018-08-05 PROCEDURE — 99232 SBSQ HOSP IP/OBS MODERATE 35: CPT | Performed by: HOSPITALIST

## 2018-08-05 PROCEDURE — 85027 COMPLETE CBC AUTOMATED: CPT | Performed by: PHYSICIAN ASSISTANT

## 2018-08-05 RX ORDER — OXYCODONE HYDROCHLORIDE 5 MG/1
5 TABLET ORAL EVERY 4 HOURS PRN
Status: DISCONTINUED | OUTPATIENT
Start: 2018-08-05 | End: 2018-08-08

## 2018-08-05 RX ORDER — PANTOPRAZOLE SODIUM 40 MG/1
40 TABLET, DELAYED RELEASE ORAL
Status: DISCONTINUED | OUTPATIENT
Start: 2018-08-05 | End: 2018-08-09 | Stop reason: HOSPADM

## 2018-08-05 RX ORDER — OXYCODONE HYDROCHLORIDE 10 MG/1
10 TABLET ORAL EVERY 4 HOURS PRN
Status: DISCONTINUED | OUTPATIENT
Start: 2018-08-05 | End: 2018-08-08

## 2018-08-05 RX ORDER — SODIUM PHOSPHATE, DIBASIC AND SODIUM PHOSPHATE, MONOBASIC 7; 19 G/133ML; G/133ML
1 ENEMA RECTAL ONCE
Status: COMPLETED | OUTPATIENT
Start: 2018-08-05 | End: 2018-08-05

## 2018-08-05 RX ADMIN — MIDODRINE HYDROCHLORIDE 5 MG: 5 TABLET ORAL at 12:12

## 2018-08-05 RX ADMIN — CLOPIDOGREL BISULFATE 75 MG: 75 TABLET, FILM COATED ORAL at 09:32

## 2018-08-05 RX ADMIN — SODIUM CHLORIDE 125 ML/HR: 0.9 INJECTION, SOLUTION INTRAVENOUS at 00:03

## 2018-08-05 RX ADMIN — DOCUSATE SODIUM 100 MG: 100 CAPSULE, LIQUID FILLED ORAL at 17:24

## 2018-08-05 RX ADMIN — CHLORHEXIDINE GLUCONATE 15 ML: 1.2 RINSE ORAL at 09:31

## 2018-08-05 RX ADMIN — METOCLOPRAMIDE HYDROCHLORIDE 5 MG: 10 TABLET ORAL at 09:32

## 2018-08-05 RX ADMIN — SODIUM CHLORIDE 125 ML/HR: 0.9 INJECTION, SOLUTION INTRAVENOUS at 06:36

## 2018-08-05 RX ADMIN — ATORVASTATIN CALCIUM 80 MG: 80 TABLET, FILM COATED ORAL at 17:24

## 2018-08-05 RX ADMIN — INSULIN LISPRO 2 UNITS: 100 INJECTION, SOLUTION INTRAVENOUS; SUBCUTANEOUS at 12:12

## 2018-08-05 RX ADMIN — ENOXAPARIN SODIUM 40 MG: 100 INJECTION SUBCUTANEOUS at 09:32

## 2018-08-05 RX ADMIN — INSULIN GLARGINE 15 UNITS: 100 INJECTION, SOLUTION SUBCUTANEOUS at 21:40

## 2018-08-05 RX ADMIN — LIDOCAINE HYDROCHLORIDE 15 ML: 20 SOLUTION ORAL; TOPICAL at 00:03

## 2018-08-05 RX ADMIN — FUROSEMIDE 20 MG: 20 TABLET ORAL at 09:32

## 2018-08-05 RX ADMIN — POLYVINYL ALCOHOL 1 DROP: 14 SOLUTION/ DROPS OPHTHALMIC at 17:24

## 2018-08-05 RX ADMIN — POLYVINYL ALCOHOL 1 DROP: 14 SOLUTION/ DROPS OPHTHALMIC at 09:31

## 2018-08-05 RX ADMIN — INSULIN LISPRO 2 UNITS: 100 INJECTION, SOLUTION INTRAVENOUS; SUBCUTANEOUS at 07:57

## 2018-08-05 RX ADMIN — Medication 325 MG: at 07:56

## 2018-08-05 RX ADMIN — SODIUM PHOSPHATE, DIBASIC AND SODIUM PHOSPHATE, MONOBASIC 1 ENEMA: 7; 19 ENEMA RECTAL at 09:49

## 2018-08-05 RX ADMIN — CHLORHEXIDINE GLUCONATE 15 ML: 1.2 RINSE ORAL at 21:40

## 2018-08-05 RX ADMIN — PANTOPRAZOLE SODIUM 40 MG: 40 TABLET, DELAYED RELEASE ORAL at 09:44

## 2018-08-05 RX ADMIN — INSULIN GLARGINE 15 UNITS: 100 INJECTION, SOLUTION SUBCUTANEOUS at 09:31

## 2018-08-05 RX ADMIN — ANTACID TABLETS 1000 MG: 500 TABLET, CHEWABLE ORAL at 03:35

## 2018-08-05 RX ADMIN — MIDODRINE HYDROCHLORIDE 5 MG: 5 TABLET ORAL at 17:24

## 2018-08-05 RX ADMIN — POLYVINYL ALCOHOL 1 DROP: 14 SOLUTION/ DROPS OPHTHALMIC at 21:41

## 2018-08-05 RX ADMIN — MIDODRINE HYDROCHLORIDE 5 MG: 5 TABLET ORAL at 07:56

## 2018-08-05 RX ADMIN — SENNOSIDES 8.6 MG: 8.6 TABLET, FILM COATED ORAL at 21:40

## 2018-08-05 RX ADMIN — TAMSULOSIN HYDROCHLORIDE 0.4 MG: 0.4 CAPSULE ORAL at 17:24

## 2018-08-05 RX ADMIN — ALUMINUM HYDROXIDE, MAGNESIUM HYDROXIDE, AND SIMETHICONE 15 ML: 200; 200; 20 SUSPENSION ORAL at 06:34

## 2018-08-05 RX ADMIN — DOCUSATE SODIUM 100 MG: 100 CAPSULE, LIQUID FILLED ORAL at 09:32

## 2018-08-05 RX ADMIN — FLUDROCORTISONE ACETATE 0.1 MG: 0.1 TABLET ORAL at 09:32

## 2018-08-05 RX ADMIN — Medication 325 MG: at 17:25

## 2018-08-05 NOTE — RESTORATIVE TECHNICIAN NOTE
Restorative Specialist Mobility Note       Activity: Dangle, Stand at bedside (Educated/encouraged pt to get in/oob with assistance  Bed alarm on  Pt callbell, phone/tray within reach )     Assistive Device: Other (Comment), Front wheel walker (Assist x2 back to bed   R surgical shoe on)       Juan José MALAVE, Restorative Technician, [employer]

## 2018-08-05 NOTE — PROGRESS NOTES
Progress Note - Shay Duron 1960, 60 y o  male MRN: [id number]    Unit/Bed#: [place] 724-01 Encounter: [id number]    Primary Care Provider: Trevon Montero DO   Date and time admitted to hospital: 7/26/2018  5:03 PM        Obstructive sleep apnea   Assessment & Plan    CPAP noncompliant        Diabetic ulcer of left heel associated with type 2 diabetes mellitus, limited to breakdown of skin [facility])   Assessment & Plan    Lab Results   Component Value Date    HGBA1C 7 9 (H) 07/27/2018    Hemoglobin A1c for tomorrow  Check blood sugars before meals and bedtime  Insulin sliding scale  Recent Labs      08/04/18   1104  08/04/18   1605  08/04/18   2043  08/05/18   0635   POCGLU  137  138  139  154*       Blood Sugar Average: Last 72 hrs:  (P) 133     Continue insulin, monitor Accu-Cheks  BG controlled   Lantus to 40 units b i d  Avoid hypokalemia         Other constipation   Assessment & Plan    Fleet enema-   Stool softeners  He reportedly has autonomic dysfunction which affects his bowels  Triple vessel coronary artery disease   Assessment & Plan    Continue Plavix statin        PAD (peripheral artery disease) ([facility])   Assessment & Plan    On statin Plavix        Benign essential HTN   Assessment & Plan    /66  Continue monitoring  Continue meds        * S/P bilateral BKA (below knee amputation) ([facility])   Assessment & Plan    BCx NGTD after 4 days  Local wound care  BKA per vascular surgery-8/3/18  POD 2  C/w wound care  Zofran p r n  nausea   Appreciate ID/vacular  recommendations                   VTE Pharmacologic Prophylaxis:   Pharmacologic: Enoxaparin (Lovenox)  Mechanical VTE Prophylaxis in Place: Yes    Patient Centered Rounds: I have performed bedside rounds with nursing staff today  Discussions with Specialists or Other Care Team Provider: yes    Education and Discussions with Family / Patient:yes  Time Spent for Care: 45 minutes    More than 50% of total time spent on counseling and coordination of care as described above  Current Length of Stay: 10 day(s)    Current Patient Status: Inpatient   Certification Statement: The patient will continue to require additional inpatient hospital stay due to med mgt POD 2 s/p left BKA    Discharge Plan: rehab    Code Status: Level 1 - Full Code      Subjective:   Heartburn  constipation  ROS negative otherwise    Objective:     Vitals:   Temp (24hrs), Av °F (36 7 °C), Min:97 2 °F (36 2 °C), Max:98 4 °F (36 9 °C)    HR:  [60-94] 94  Resp:  [16-20] 18  BP: (128-162)/(61-76) 128/66  SpO2:  [93 %-99 %] 93 %  Body mass index is 34 23 kg/m²  Input and Output Summary (last 24 hours): Intake/Output Summary (Last 24 hours) at 18 1010  Last data filed at 18 0666   Gross per 24 hour   Intake          2813 75 ml   Output             4140 ml   Net         -1326 25 ml       Physical Exam:     Physical Exam   Constitutional: He is oriented to person, place, and time  No distress  HENT:   Head: Normocephalic and atraumatic  Mouth/Throat: Oropharynx is clear and moist  No oropharyngeal exudate  Eyes: Conjunctivae and EOM are normal  Pupils are equal, round, and reactive to light  Neck: Normal range of motion  Neck supple  No JVD present  Cardiovascular: Normal rate and normal heart sounds  Exam reveals no gallop and no friction rub  No murmur heard  Pulmonary/Chest: Effort normal and breath sounds normal  No respiratory distress  He has no wheezes  He has no rales  Abdominal: Soft  Bowel sounds are normal  He exhibits no distension  There is no tenderness  There is no rebound  Musculoskeletal: Normal range of motion  He exhibits deformity  Left BKA   Neurological: He is alert and oriented to person, place, and time  He has normal reflexes  He displays normal reflexes  No cranial nerve deficit  He exhibits normal muscle tone  Coordination normal    Skin: Skin is warm  No erythema     Psychiatric: He has a normal mood and affect  His behavior is normal          Additional Data:     Labs:      Results from last 7 days  Lab Units 08/05/18  0537   WBC Thousand/uL 9 69   HEMOGLOBIN g/dL 9 5*   HEMATOCRIT % 30 3*   PLATELETS Thousands/uL 197   NEUTROS PCT % 80*   LYMPHS PCT % 10*   MONOS PCT % 9   EOS PCT % 1       Results from last 7 days  Lab Units 08/05/18  0537   SODIUM mmol/L 139   POTASSIUM mmol/L 3 7   CHLORIDE mmol/L 105   CO2 mmol/L 29   BUN mg/dL 8   CREATININE mg/dL 0 61   CALCIUM mg/dL 7 9*   TOTAL PROTEIN g/dL 6 1*   BILIRUBIN TOTAL mg/dL 0 51   ALK PHOS U/L 41*   ALT U/L 10*   AST U/L 19   GLUCOSE RANDOM mg/dL 137           Results from last 7 days  Lab Units 08/05/18  0635 08/04/18  2043 08/04/18  1605 08/04/18  1104 08/04/18  0636 08/03/18  2129 08/03/18  1834 08/03/18  1624 08/03/18  1527 08/03/18  1058 08/03/18  0645 08/03/18  0541   POC GLUCOSE mg/dl 154* 139 138 137 127 138 124 88 85 94 109 116             * I Have Reviewed All Lab Data Listed Above  * Additional Pertinent Lab Tests Reviewed:  [address] Admission Reviewed    Imaging:    Imaging Reports Reviewed Today   Recent Cultures (last 7 days):           Last 24 Hours Medication List:     Current Facility-Administered Medications:  acetaminophen 650 mg Oral Q6H PRN Jennifer Hernandez MD   aluminum-magnesium hydroxide-simethicone 15 mL Oral Q6H PRN Jennifer Hernandez MD   atorvastatin 80 mg Oral Daily With Edilia Norris MD   bisacodyl 10 mg Rectal Daily PRN Lauro Gilmore MD   bisacodyl 10 mg Rectal Once Jean Claude Chavira PA-C   bromfenac sodium 1 drop Right Eye Daily Jennifer Hernandez MD   calcium carbonate 1,000 mg Oral Daily PRN Marisol Canada PA-C   chlorhexidine 15 mL Swish & Spit Q12H [facility] Jean Claude Chavira PA-C   clopidogrel 75 mg Oral Daily Jennifer Hernandez MD   docusate sodium 100 mg Oral BID Jennifer Hernandez MD   enoxaparin 40 mg Subcutaneous Daily Troy Holbrook MD   ferrous sulfate 325 mg Oral BID With Meals Tsering Ya Bonny Grossman MD   fludrocortisone 0 1 mg Oral Daily Thea Arevalo MD   furosemide 20 mg Oral Daily Thea Arevalo MD   hydrALAZINE 5 mg Intravenous Q6H PRN Pallavi Saldivar PA-C   HYDROmorphone 0 5 mg Intravenous Q3H PRN Nola Kang MD   insulin glargine 15 Units Subcutaneous Q12H [address] Costa Gomez, [place]   insulin lispro 1-5 Units Subcutaneous HS Thea Arevalo MD   insulin lispro 2-12 Units Subcutaneous TID AC Thea Arevalo MD   lidocaine viscous 15 mL Swish & Swallow 4x Daily PRN Marisol Canada PA-C   metoclopramide 10 mg Intravenous Q6H PRN Serenity Tracy MD   metoclopramide 5 mg Oral Daily Thea Arevalo MD   midodrine 5 mg Oral TID With Meals Thea Arevalo MD   mineral oil 30 mL Oral Once Britney Rocha PA-C   ondansetron 4 mg Intravenous Q6H PRN Serenity Tracy MD   oxyCODONE 10 mg Oral Q4H PRN Nola Kang MD   oxyCODONE 5 mg Oral Q4H PRN Nola Kang MD   pantoprazole 40 mg Oral Early Morning Serenity Tracy MD   polyethylene glycol 17 g Oral Daily Lizzy Bush MD   polyvinyl alcohol 1 drop Right Eye TID Hugo Rick PA-C   senna 1 tablet Oral HS Britney Rocha PA-C   sodium chloride (PF) 3 mL Intravenous PRN José cMelroy DO   tamsulosin 0 4 mg Oral Daily With Rayray Butt MD        Today, Patient Was Seen By: Serenity Tracy MD    ** Please Note: Dictation voice to text software may have been used in the creation of this document   **

## 2018-08-05 NOTE — RESTORATIVE TECHNICIAN NOTE
Restorative Specialist Mobility Note       Activity: Commode, Stand at bedside, Dangle (Educated/encouraged pt to get oob with assistance for meals  Assisted pt to the bedside commode   Pt callbell, phone/tray within reach )     Assistive Device: Front wheel walker (R surgical shoe on)       Kemal Granados BS, Restorative Technician, [employer]

## 2018-08-05 NOTE — PROGRESS NOTES
Progress Note - Vascular Surgery   Lisa Israel 60 y o  male MRN: [id number]  Unit/Bed#: [facility] 724-01 Encounter: [id number]    Assessment:  61 y/o M w/ PAD, p/w nonsalvagable L foot wound s/p L BKA 8/3    SUREKHA w/ SS output 240    Plan:  --Monitor drain output  --Dressing down 8/6  --Pain control, wean PCA  --Stout in place  --Regular diet  --Continue lovenox  --f/u AM labs  --PT/OT if pain well controlled  --Continue care per primary team    Subjective/Objective     Subjective: Stout placed overnight  Patient states he has difficulty voiding if he cannot stand  Pain well controlled  Patient seems in better spirits this morning  He states his wife is coming to visit today  +flatus  Patient endorses nausea w/ emesis that has resolves with PRN medication  Denies f/c/d  Objective:     Blood pressure 136/61, pulse 80, temperature 97 7 °F (36 5 °C), temperature source Oral, resp  rate 18, height 6' 1" (1 854 m), weight 116 kg (255 lb 11 7 oz), SpO2 97 %  ,Body mass index is 33 74 kg/m²        Intake/Output Summary (Last 24 hours) at 08/05/18 4761  Last data filed at 08/05/18 0330   Gross per 24 hour   Intake             2800 ml   Output             4865 ml   Net            -2065 ml       Invasive Devices     Peripheral Intravenous Line            Peripheral IV 08/01/18 Right Forearm 3 days    Peripheral IV 08/03/18 Right Wrist 1 day          Drain            Closed/Suction Drain Left Leg Bulb 19 Fr  1 day    Urethral Catheter 18 Fr  less than 1 day                Physical Exam:     GEN: NAD  HEENT: MMM  CV: RRR  Lung: normal effort  Ab: Soft, NT/ND  Extrem: No CCE; LLE wrapped c/d/i; palp fem pulses b/l; SUREKHA drain w/ SS output  Neuro: AAOx3, motor and sensation grossly intact      Lab, Imaging and other studies:  CBC:   Lab Results   Component Value Date    WBC 10 10 08/04/2018    HGB 10 1 (L) 08/04/2018    HCT 33 3 (L) 08/04/2018    MCV 85 08/04/2018     08/04/2018    MCH 25 7 (L) 08/04/2018    MCHC 30 3 (L) 08/04/2018    RDW 15 8 (H) 08/04/2018    MPV 9 8 08/04/2018    NRBC 0 08/04/2018   , CMP:   Lab Results   Component Value Date     08/04/2018    K 4 0 08/04/2018     08/04/2018    CO2 28 08/04/2018    ANIONGAP 6 08/04/2018    BUN 12 08/04/2018    CREATININE 0 73 08/04/2018    GLUCOSE 114 08/04/2018    CALCIUM 7 8 (L) 08/04/2018    AST 28 08/04/2018    ALT 13 08/04/2018    ALKPHOS 42 (L) 08/04/2018    PROT 6 4 08/04/2018    BILITOT 0 67 08/04/2018    EGFR 102 08/04/2018     VTE Pharmacologic Prophylaxis: Lovenox  VTE Mechanical Prophylaxis: sequential compression device

## 2018-08-05 NOTE — ASSESSMENT & PLAN NOTE
BCx NGTD after 4 days  Local wound care  BKA per vascular surgery-8/3/18  POD 2  C/w wound care  Zofran p r n  nausea   Appreciate ID/vacular  recommendations

## 2018-08-05 NOTE — ASSESSMENT & PLAN NOTE
Lab Results   Component Value Date    HGBA1C 7 9 (H) 07/27/2018    Hemoglobin A1c for tomorrow  Check blood sugars before meals and bedtime  Insulin sliding scale  Recent Labs      08/04/18   1104  08/04/18   1605  08/04/18   2043  08/05/18   0635   POCGLU  137  138  139  154*       Blood Sugar Average: Last 72 hrs:  (P) 133     Continue insulin, monitor Accu-Cheks  BG controlled   Lantus to 40 units b i d    Avoid hypokalemia

## 2018-08-05 NOTE — PROGRESS NOTES
Pt c/o some mid sacral soreness and he has had a bedsore in that spot after his open heart surgery in past, he is somewhat resistant to being propped side to side with pillows does not like it at all, air pump immediately applied to bed, mid sacrum evaluated and red but blanchable, propped slightly offloading with foam wedge, allevyn dressing applied for prevention and rt foot with sdti on base of foot which appears only ecchymotic in rectangular shape at this time with ecchymosis on rt post ankle region elevated on pillow and with the new bed he has with bed extender on his foot no longer touches the bottom of bed at all , no c/o pain at this time and heartburn right now seems a bit improved

## 2018-08-06 LAB
ALBUMIN SERPL BCP-MCNC: 2.2 G/DL (ref 3.5–5)
ALP SERPL-CCNC: 41 U/L (ref 46–116)
ALT SERPL W P-5'-P-CCNC: 11 U/L (ref 12–78)
ANION GAP SERPL CALCULATED.3IONS-SCNC: 6 MMOL/L (ref 4–13)
AST SERPL W P-5'-P-CCNC: 17 U/L (ref 5–45)
BASOPHILS # BLD AUTO: 0.05 THOUSANDS/ΜL (ref 0–0.1)
BASOPHILS NFR BLD AUTO: 1 % (ref 0–1)
BILIRUB SERPL-MCNC: 0.43 MG/DL (ref 0.2–1)
BUN SERPL-MCNC: 10 MG/DL (ref 5–25)
CALCIUM SERPL-MCNC: 7.9 MG/DL (ref 8.3–10.1)
CHLORIDE SERPL-SCNC: 103 MMOL/L (ref 100–108)
CO2 SERPL-SCNC: 29 MMOL/L (ref 21–32)
CREAT SERPL-MCNC: 0.59 MG/DL (ref 0.6–1.3)
EOSINOPHIL # BLD AUTO: 0.13 THOUSAND/ΜL (ref 0–0.61)
EOSINOPHIL NFR BLD AUTO: 1 % (ref 0–6)
ERYTHROCYTE [DISTWIDTH] IN BLOOD BY AUTOMATED COUNT: 16.1 % (ref 11.6–15.1)
GFR SERPL CREATININE-BSD FRML MDRD: 111 ML/MIN/1.73SQ M
GLUCOSE SERPL-MCNC: 112 MG/DL (ref 65–140)
GLUCOSE SERPL-MCNC: 115 MG/DL (ref 65–140)
GLUCOSE SERPL-MCNC: 135 MG/DL (ref 65–140)
GLUCOSE SERPL-MCNC: 164 MG/DL (ref 65–140)
GLUCOSE SERPL-MCNC: 211 MG/DL (ref 65–140)
HCT VFR BLD AUTO: 29 % (ref 36.5–49.3)
HGB BLD-MCNC: 8.8 G/DL (ref 12–17)
IMM GRANULOCYTES # BLD AUTO: 0.02 THOUSAND/UL (ref 0–0.2)
IMM GRANULOCYTES NFR BLD AUTO: 0 % (ref 0–2)
LYMPHOCYTES # BLD AUTO: 1.32 THOUSANDS/ΜL (ref 0.6–4.47)
LYMPHOCYTES NFR BLD AUTO: 14 % (ref 14–44)
MCH RBC QN AUTO: 26 PG (ref 26.8–34.3)
MCHC RBC AUTO-ENTMCNC: 30.3 G/DL (ref 31.4–37.4)
MCV RBC AUTO: 86 FL (ref 82–98)
MONOCYTES # BLD AUTO: 0.86 THOUSAND/ΜL (ref 0.17–1.22)
MONOCYTES NFR BLD AUTO: 9 % (ref 4–12)
NEUTROPHILS # BLD AUTO: 7.36 THOUSANDS/ΜL (ref 1.85–7.62)
NEUTS SEG NFR BLD AUTO: 75 % (ref 43–75)
NRBC BLD AUTO-RTO: 0 /100 WBCS
PLATELET # BLD AUTO: 210 THOUSANDS/UL (ref 149–390)
PMV BLD AUTO: 9.8 FL (ref 8.9–12.7)
POTASSIUM SERPL-SCNC: 3.6 MMOL/L (ref 3.5–5.3)
PROT SERPL-MCNC: 6.4 G/DL (ref 6.4–8.2)
RBC # BLD AUTO: 3.39 MILLION/UL (ref 3.88–5.62)
SODIUM SERPL-SCNC: 138 MMOL/L (ref 136–145)
WBC # BLD AUTO: 9.74 THOUSAND/UL (ref 4.31–10.16)

## 2018-08-06 PROCEDURE — 99232 SBSQ HOSP IP/OBS MODERATE 35: CPT | Performed by: INTERNAL MEDICINE

## 2018-08-06 PROCEDURE — 80053 COMPREHEN METABOLIC PANEL: CPT | Performed by: HOSPITALIST

## 2018-08-06 PROCEDURE — 85025 COMPLETE CBC W/AUTO DIFF WBC: CPT | Performed by: HOSPITALIST

## 2018-08-06 PROCEDURE — 82948 REAGENT STRIP/BLOOD GLUCOSE: CPT

## 2018-08-06 PROCEDURE — 99232 SBSQ HOSP IP/OBS MODERATE 35: CPT | Performed by: HOSPITALIST

## 2018-08-06 RX ADMIN — Medication 325 MG: at 16:33

## 2018-08-06 RX ADMIN — MIDODRINE HYDROCHLORIDE 5 MG: 5 TABLET ORAL at 07:39

## 2018-08-06 RX ADMIN — ENOXAPARIN SODIUM 40 MG: 100 INJECTION SUBCUTANEOUS at 08:57

## 2018-08-06 RX ADMIN — INSULIN LISPRO 1 UNITS: 100 INJECTION, SOLUTION INTRAVENOUS; SUBCUTANEOUS at 21:41

## 2018-08-06 RX ADMIN — Medication 325 MG: at 07:39

## 2018-08-06 RX ADMIN — POLYVINYL ALCOHOL 1 DROP: 14 SOLUTION/ DROPS OPHTHALMIC at 16:33

## 2018-08-06 RX ADMIN — INSULIN GLARGINE 15 UNITS: 100 INJECTION, SOLUTION SUBCUTANEOUS at 09:13

## 2018-08-06 RX ADMIN — FLUDROCORTISONE ACETATE 0.1 MG: 0.1 TABLET ORAL at 08:58

## 2018-08-06 RX ADMIN — MIDODRINE HYDROCHLORIDE 5 MG: 5 TABLET ORAL at 16:33

## 2018-08-06 RX ADMIN — INSULIN LISPRO 2 UNITS: 100 INJECTION, SOLUTION INTRAVENOUS; SUBCUTANEOUS at 12:35

## 2018-08-06 RX ADMIN — FUROSEMIDE 20 MG: 20 TABLET ORAL at 08:58

## 2018-08-06 RX ADMIN — POLYVINYL ALCOHOL 1 DROP: 14 SOLUTION/ DROPS OPHTHALMIC at 08:59

## 2018-08-06 RX ADMIN — METOCLOPRAMIDE HYDROCHLORIDE 5 MG: 10 TABLET ORAL at 08:58

## 2018-08-06 RX ADMIN — ATORVASTATIN CALCIUM 80 MG: 80 TABLET, FILM COATED ORAL at 16:33

## 2018-08-06 RX ADMIN — CHLORHEXIDINE GLUCONATE 15 ML: 1.2 RINSE ORAL at 08:57

## 2018-08-06 RX ADMIN — INSULIN GLARGINE 15 UNITS: 100 INJECTION, SOLUTION SUBCUTANEOUS at 21:40

## 2018-08-06 RX ADMIN — CLOPIDOGREL BISULFATE 75 MG: 75 TABLET, FILM COATED ORAL at 08:58

## 2018-08-06 RX ADMIN — MIDODRINE HYDROCHLORIDE 5 MG: 5 TABLET ORAL at 12:35

## 2018-08-06 NOTE — PROGRESS NOTES
Progress Note - Goldie Moreno 1960, 60 y o  male MRN: [id number]    Unit/Bed#: [facility] 724-01 Encounter: [id number]    Primary Care Provider: Monika Harris DO   Date and time admitted to hospital: 7/26/2018  5:03 PM        Obstructive sleep apnea   Assessment & Plan    CPAP noncompliant        Diabetic ulcer of left heel associated with type 2 diabetes mellitus, limited to breakdown of skin [facility])   Assessment & Plan    Lab Results   Component Value Date    HGBA1C 7 9 (H) 07/27/2018    Hemoglobin A1c for tomorrow  Check blood sugars before meals and bedtime  Insulin sliding scale  Recent Labs      08/05/18   1842  08/05/18   2104  08/06/18   0631  08/06/18   1110   POCGLU  167*  137  115  164*       Blood Sugar Average: Last 72 hrs:  (P) 397 [id number]     Continue insulin, monitor Accu-Cheks  BG controlled   Lantus to 40 units b i d  Other constipation   Assessment & Plan    Fleet enema- prn  Stool softeners  He reportedly has autonomic dysfunction which affects his bowels  Triple vessel coronary artery disease   Assessment & Plan    Continue Plavix statin        PAD (peripheral artery disease) ([facility])   Assessment & Plan    On statin Plavix        Benign essential HTN   Assessment & Plan    /64  Continue monitoring  Continue meds        * S/P bilateral BKA (below knee amputation) ([facility])   Assessment & Plan    BCx NGTD after 4 days  Local wound care  BKA per vascular surgery-8/3/18  POD 3-   C/w wound care  Zofran p r n  nausea   Appreciate ID/vacular  recommendations                   VTE Pharmacologic Prophylaxis:   Pharmacologic: Enoxaparin (Lovenox)  Mechanical VTE Prophylaxis in Place: Yes    Patient Centered Rounds: I have performed bedside rounds with nursing staff today  Discussions with Specialists or Other Care Team Provider: yes    Education and Discussions with Family / Patient:yes  Time Spent for Care: 45 minutes    More than 50% of total time spent on counseling and coordination of care as described above  Current Length of Stay: 11 day(s)    Current Patient Status: Inpatient   Certification Statement: The patient will continue to require additional inpatient hospital stay due to med mgt s/p left BKA    Discharge Plan: rehab    Code Status: Level 1 - Full Code      Subjective:   Pain controlled on limb  Left BKA  Nausea resolved  ROS negative otherwise  Objective:     Vitals:   Temp (24hrs), Av 3 °F (36 8 °C), Min:97 9 °F (36 6 °C), Max:98 7 °F (37 1 °C)    HR:  [75-79] 75  Resp:  [18] 18  BP: ()/(49-66) 135/64  SpO2:  [92 %-98 %] 98 %  Body mass index is 34 44 kg/m²  Input and Output Summary (last 24 hours): Intake/Output Summary (Last 24 hours) at 18 1536  Last data filed at 18 1415   Gross per 24 hour   Intake             1260 ml   Output             2125 ml   Net             -865 ml       Physical Exam:     Physical Exam   Constitutional: He is oriented to person, place, and time  No distress  HENT:   Head: Normocephalic and atraumatic  Mouth/Throat: Oropharynx is clear and moist  No oropharyngeal exudate  Eyes: Conjunctivae and EOM are normal  Pupils are equal, round, and reactive to light  Neck: Normal range of motion  Neck supple  No JVD present  Cardiovascular: Normal rate and normal heart sounds  Exam reveals no gallop and no friction rub  No murmur heard  Pulmonary/Chest: Breath sounds normal  No respiratory distress  He has no wheezes  He has no rales  Abdominal: Soft  Bowel sounds are normal  He exhibits no distension  There is no tenderness  There is no rebound  Musculoskeletal: Normal range of motion  He exhibits deformity  He exhibits no edema or tenderness  Left BKA   Neurological: He is alert and oriented to person, place, and time  He has normal reflexes  No cranial nerve deficit  Coordination normal    Skin: Skin is warm  Psychiatric: He has a normal mood and affect           Additional Data: Labs:      Results from last 7 days  Lab Units 08/06/18  0511   WBC Thousand/uL 9 74   HEMOGLOBIN g/dL 8 8*   HEMATOCRIT % 29 0*   PLATELETS Thousands/uL 210   NEUTROS PCT % 75   LYMPHS PCT % 14   MONOS PCT % 9   EOS PCT % 1       Results from last 7 days  Lab Units 08/06/18  0511   SODIUM mmol/L 138   POTASSIUM mmol/L 3 6   CHLORIDE mmol/L 103   CO2 mmol/L 29   BUN mg/dL 10   CREATININE mg/dL 0 59*   CALCIUM mg/dL 7 9*   TOTAL PROTEIN g/dL 6 4   BILIRUBIN TOTAL mg/dL 0 43   ALK PHOS U/L 41*   ALT U/L 11*   AST U/L 17   GLUCOSE RANDOM mg/dL 112           Results from last 7 days  Lab Units 08/06/18  1110 08/06/18  0631 08/05/18  2104 08/05/18  1842 08/05/18  1615 08/05/18  1107 08/05/18  0635 08/04/18  2043 08/04/18  1605 08/04/18  1104 08/04/18  0636 08/03/18  2129   POC GLUCOSE mg/dl 164* 115 137 167* 128 150* 154* 139 138 137 127 138             * I Have Reviewed All Lab Data Listed Above  * Additional Pertinent Lab Tests Reviewed:  [address] Admission Reviewed    Imaging:    Imaging Reports Reviewed Today   Recent Cultures (last 7 days):           Last 24 Hours Medication List:     Current Facility-Administered Medications:  acetaminophen 650 mg Oral Q6H PRN Jessica Cruz MD   aluminum-magnesium hydroxide-simethicone 15 mL Oral Q6H PRN Jessica Cruz MD   atorvastatin 80 mg Oral Daily With Humera Saini MD   bisacodyl 10 mg Rectal Daily PRN Lenka Escalera MD   bisacodyl 10 mg Rectal Once [place] PA-C   bromfenac sodium 1 drop Right Eye Daily Jessica Cruz MD   calcium carbonate 1,000 mg Oral Daily PRN Marisol Canada PA-C   chlorhexidine 15 mL Swish & Spit Q12H [address] [place] PA-C   clopidogrel 75 mg Oral Daily Jessica Cruz MD   docusate sodium 100 mg Oral BID Jessica Cruz MD   enoxaparin 40 mg Subcutaneous Daily Eugenio Vega MD   ferrous sulfate 325 mg Oral BID With Meals Jessica Cruz MD   fludrocortisone 0 1 mg Oral Daily Allison Talbot MD   furosemide 20 mg Oral Daily Allison Talbot MD   hydrALAZINE 5 mg Intravenous Q6H PRN Zina Black PA-C   HYDROmorphone 0 5 mg Intravenous Q3H PRN Ethan Silva MD   insulin glargine 15 Units Subcutaneous Q12H [address] CARLTON Roman   insulin lispro 1-5 Units Subcutaneous HS Allison Talbot MD   insulin lispro 2-12 Units Subcutaneous TID AC Allison Talbot MD   lidocaine viscous 15 mL Swish & Swallow 4x Daily PRN Marisol Canada PA-C   metoclopramide 10 mg Intravenous Q6H PRN Cortney Connelly MD   metoclopramide 5 mg Oral Daily Allison Talbot MD   midodrine 5 mg Oral TID With Meals Allison Talbot MD   mineral oil 30 mL Oral Once Fausto Peck PA-C   ondansetron 4 mg Intravenous Q6H PRN Cortney Connelly MD   oxyCODONE 10 mg Oral Q4H PRN Ethan Silva MD   oxyCODONE 5 mg Oral Q4H PRN Ethan Silva MD   pantoprazole 40 mg Oral Early Morning Cortney Connelly MD   polyethylene glycol 17 g Oral Daily Tea Zacarias MD   polyvinyl alcohol 1 drop Right Eye TID Ileana Pavon PA-C   senna 1 tablet Oral HS Tarah Sen PA-C   sodium chloride (PF) 3 mL Intravenous PRN Jeanie Cluster, DO   tamsulosin 0 4 mg Oral Daily With Ko Kim MD        Today, Patient Was Seen By: Cortney Connelly MD    ** Please Note: Dictation voice to text software may have been used in the creation of this document   **

## 2018-08-06 NOTE — CASE MANAGEMENT
Continued Stay Review    Date: 18      Vital Signs:     Temp (24hrs), Av °F (36 7 °C), Min:97 2 °F (36 2 °C), Max:98 4 °F (36 9 °C)     HR:  [60-94] 94  Resp:  [16-20] 18  BP: (128-162)/(61-76) 128/66  SpO2:  [93 %-99 %] 93 %  Body mass index is 34 23 kg/m²       Medications:     acetaminophen 650 mg Oral Q6H PRN   aluminum-magnesium hydroxide-simethicone 15 mL Oral Q6H PRN   atorvastatin 80 mg Oral Daily With Dinner   bisacodyl 10 mg Rectal Daily PRN   bisacodyl 10 mg Rectal Once   bromfenac sodium 1 drop Right Eye Daily   calcium carbonate 1,000 mg Oral Daily PRN   chlorhexidine 15 mL Swish & Spit Q12H [facility]   clopidogrel 75 mg Oral Daily   docusate sodium 100 mg Oral BID   enoxaparin 40 mg Subcutaneous Daily   ferrous sulfate 325 mg Oral BID With Meals   fludrocortisone 0 1 mg Oral Daily   furosemide 20 mg Oral Daily   hydrALAZINE 5 mg Intravenous Q6H PRN   HYDROmorphone 0 5 mg Intravenous Q3H PRN   insulin glargine 15 Units Subcutaneous Q12H [place]   insulin lispro 1-5 Units Subcutaneous HS   insulin lispro 2-12 Units Subcutaneous TID AC   lidocaine viscous 15 mL Swish & Swallow 4x Daily PRN   metoclopramide 10 mg Intravenous Q6H PRN   metoclopramide 5 mg Oral Daily   midodrine 5 mg Oral TID With Meals   mineral oil 30 mL Oral Once   ondansetron 4 mg Intravenous Q6H PRN   oxyCODONE 10 mg Oral Q4H PRN   oxyCODONE 5 mg Oral Q4H PRN   pantoprazole 40 mg Oral Early Morning   polyethylene glycol 17 g Oral Daily   polyvinyl alcohol 1 drop Right Eye TID   senna 1 tablet Oral HS   sodium chloride (PF) 3 mL Intravenous PRN   tamsulosin 0 4 mg Oral Daily With Dinner          Abnormal Labs/Diagnostic Results:     Lab Units 18  0537   WBC Thousand/uL 9 69   HEMOGLOBIN g/dL 9 5*   HEMATOCRIT % 30 3*   PLATELETS Thousands/uL 197   NEUTROS PCT % 80*   LYMPHS PCT % 10           Age/Sex: 60 y o  male     Assessment/Plan:    Obstructive sleep apnea   Assessment & Plan     CPAP noncompliant          Diabetic ulcer of left heel associated with type 2 diabetes mellitus, limited to breakdown of skin [facility])   Assessment & Plan             Lab Results   Component Value Date     HGBA1C 7 9 (H) 07/27/2018    Hemoglobin A1c for tomorrow  Check blood sugars before meals and bedtime  Insulin sliding scale             Recent Labs      08/04/18   1104  08/04/18   1605  08/04/18   2043  08/05/18   0635   POCGLU  137  138  139  154*         Blood Sugar Average: Last 72 hrs:  (P) 133      Continue insulin, monitor Accu-Cheks  BG controlled   Lantus to 40 units b i d    Avoid hypokalemia           Other constipation   Assessment & Plan     Fleet enema-   Stool softeners  He reportedly has autonomic dysfunction which affects his bowels            Triple vessel coronary artery disease   Assessment & Plan     Continue Plavix statin          PAD (peripheral artery disease) ([facility])   Assessment & Plan     On statin Plavix          Benign essential HTN   Assessment & Plan     /66  Continue monitoring  Continue meds          * S/P bilateral BKA (below knee amputation) ([facility])   Assessment & Plan     BCx NGTD after 4 days  Local wound care  BKA per vascular surgery-8/3/18  POD 2  C/w wound care  Zofran p r n  nausea   Appreciate ID/vacular  recommendations     The patient will continue to require additional inpatient hospital stay due to med mgt POD 2 s/p left BKA      Discharge Plan: INPATIENT REHAB

## 2018-08-06 NOTE — RESTORATIVE TECHNICIAN NOTE
Restorative Specialist Mobility Note       Activity: Ambulate in room, Chair, Dangle, Stand at bedside (Educated/encouraged pt to ambulate with assistance 3-4 x's/day   Pt callbell, phone/tray within reach )     Assistive Device: Front wheel walker (R Surgical shoe on)       Mike MALAVE, Restorative Technician, [employer] [facility]

## 2018-08-06 NOTE — ASSESSMENT & PLAN NOTE
Fleet enema- prn  Stool softeners  He reportedly has autonomic dysfunction which affects his bowels

## 2018-08-06 NOTE — ASSESSMENT & PLAN NOTE
BCx NGTD after 4 days  Local wound care  BKA per vascular surgery-8/3/18  POD 3-   C/w wound care  Zofran p r n  nausea   Appreciate ID/vacular  recommendations

## 2018-08-06 NOTE — PROGRESS NOTES
Progress Note - Infectious Disease   Manny Tran 60 y o  male MRN: [id number]  Unit/Bed#: [facility] 724-01 Encounter: [id number]      Impression/Recommendations:  1   Left heel chronic diabetic ulcer with underlying osteomyelitis/superinfection  Consider GPC/GNRs/anaerobes  Foot deemed nonsalvageable per podiatry  Blood cultures negative  Clinically stable without sepsis  Now status post BKA with surgical cure  Rec:  ? Continue to follow closely off antibiotics  ? Follow temperatures and WBC closely  ? Supportive care as per the primary service     2  DM with DPN  A1C 7 9  Rec:  ? Continue management as per the primary service     3   PAD             Status post recent Agram/TPA  Now status post left BKA  Rec:  ? Close vascular surgery follow-up ongoing     The patient is stable from an ID standpoint  We will sign off for now  Please call with new questions      Antibiotics:  Off antibiotics # 3    Subjective:  Patient seen on AM rounds  Doing well  Denies pain  Denies fevers, chills, sweats, nausea, vomiting, or diarrhea  24 Hour Events:  No documented fevers, chills, sweats, nausea, vomiting, or diarrhea  Objective:  Vitals:  HR:  [77-86] 79  Resp:  [18] 18  BP: ()/(49-66) 113/62  SpO2:  [92 %-97 %] 97 %  Temp (24hrs), Av 2 °F (36 8 °C), Min:97 9 °F (36 6 °C), Max:98 7 °F (37 1 °C)  Current: Temperature: 98 7 °F (37 1 °C)    Physical Exam:   General:  No acute distress  Psychiatric:  Awake and alert  Pulmonary:  Normal respiratory excursion without accessory muscle use  Abdomen:  Soft, nontender  Extremities:  No edema  Wound pics from this AM reviewed:  Surgical site well coapted with sutures without erythema or purulence  Skin:  No rashes    Lab Results:  I have personally reviewed pertinent labs      Results from last 7 days  Lab Units 18  0511 18  1947 18  0537 18  0553   SODIUM mmol/L 138 137 139 139   POTASSIUM mmol/L 3 6 3 7 3 7 4 0   CHLORIDE mmol/L 103 104 105 105 CO2 mmol/L 29 30 29 28   ANION GAP mmol/L 6 3* 5 6   BUN mg/dL 10 9 8 12   CREATININE mg/dL 0 59* 0 72 0 61 0 73   EGFR ml/min/1 73sq m 111 102 109 102   GLUCOSE RANDOM mg/dL 112 155* 137 114   CALCIUM mg/dL 7 9* 7 7* 7 9* 7 8*   AST U/L 17  --  19 28   ALT U/L 11*  --  10* 13   ALK PHOS U/L 41*  --  41* 42*   TOTAL PROTEIN g/dL 6 4  --  6 1* 6 4   BILIRUBIN TOTAL mg/dL 0 43  --  0 51 0 67       Results from last 7 days  Lab Units 08/06/18  0511 08/05/18  1947 08/05/18  0537   WBC Thousand/uL 9 74 9 38 9 69   HEMOGLOBIN g/dL 8 8* 9 4* 9 5*   PLATELETS Thousands/uL 210 216 197           Imaging Studies:   I have personally reviewed pertinent imaging study reports and images in PACS  EKG, Pathology, and Other Studies:   I have personally reviewed pertinent reports

## 2018-08-06 NOTE — PROGRESS NOTES
Progress Note - Vascular Surgery   Jefferson Castro 60 y o  male MRN: [id number]  Unit/Bed#: [facility] 724-01 Encounter: [id number]    Assessment:  59 y/o M w/ PAD, p/w nonsalvagable L foot wound, s/p L BKA on 8/3, POD #3    Plan:  --Dressing taken down this AM  --Monitor SUREKHA drain output  --Pain control  --Stout  --DVT ppx: Lovenox  --PT/OT  --Continue care per primary team    Subjective/Objective     Subjective:     No acute events overnight  This morning, pt denies any complaints  L BKA surgical site intact with SUREKHA drain in place  Objective:     Blood pressure 134/66, pulse 77, temperature 97 9 °F (36 6 °C), temperature source Oral, resp  rate 18, height 6' 1" (1 854 m), weight 118 kg (259 lb 7 7 oz), SpO2 92 %  ,Body mass index is 34 23 kg/m²  Intake/Output Summary (Last 24 hours) at 08/06/18 0509  Last data filed at 08/05/18 2109   Gross per 24 hour   Intake          1536 67 ml   Output             1605 ml   Net           -68 33 ml       Invasive Devices     Peripheral Intravenous Line            Peripheral IV 08/03/18 Right Wrist 2 days          Drain            Closed/Suction Drain Left Leg Bulb 19 Fr  2 days    Urethral Catheter 18 Fr  1 day                Physical Exam:     GEN: NAD  HEENT: MMM  CV: RRR  Lung: normal effort  Ab: Soft, NT/ND  Extrem: No CCE; LLE BKA site c/d/i w/out drainage, as pictured below; palp fem pulses b/l; L SUREKHA drain in place w/ 75 cc serosanguinous output  Neuro:  A+Ox3, motor and sensation grossly intact            Lab, Imaging and other studies:  CBC:   Lab Results   Component Value Date    WBC 9 38 08/05/2018    HGB 9 4 (L) 08/05/2018    HCT 30 2 (L) 08/05/2018    MCV 85 08/05/2018     08/05/2018    MCH 26 5 (L) 08/05/2018    MCHC 31 1 (L) 08/05/2018    RDW 16 1 (H) 08/05/2018    MPV 9 9 08/05/2018    NRBC 0 08/05/2018   , CMP:   Lab Results   Component Value Date     08/05/2018    K 3 7 08/05/2018     08/05/2018    CO2 30 08/05/2018    ANIONGAP 3 (L) 08/05/2018 BUN 9 08/05/2018    CREATININE 0 72 08/05/2018    GLUCOSE 155 (H) 08/05/2018    CALCIUM 7 7 (L) 08/05/2018    AST 19 08/05/2018    ALT 10 (L) 08/05/2018    ALKPHOS 41 (L) 08/05/2018    PROT 6 1 (L) 08/05/2018    BILITOT 0 51 08/05/2018    EGFR 102 08/05/2018   , Coagulation: No results found for: PT, INR, APTT, Urinalysis: No results found for: COLORU, CLARITYU, SPECGRAV, PHUR, LEUKOCYTESUR, NITRITE, PROTEINUA, GLUCOSEU, KETONESU, BILIRUBINUR, BLOODU, Amylase: No results found for: AMYLASE, Lipase: No results found for: LIPASE  VTE Pharmacologic Prophylaxis: Enoxaparin (Lovenox)  VTE Mechanical Prophylaxis: sequential compression device

## 2018-08-06 NOTE — SOCIAL WORK
CM met with Pt and spouse to discuss d/c planning, and was requested to make a referral to [address] [place] [address], as Pt was there in the recent past and spouse is an employee there   CM made the requested referral

## 2018-08-06 NOTE — ASSESSMENT & PLAN NOTE
Lab Results   Component Value Date    HGBA1C 7 9 (H) 07/27/2018    Hemoglobin A1c for tomorrow  Check blood sugars before meals and bedtime  Insulin sliding scale  Recent Labs      08/05/18   1842  08/05/18   2104  08/06/18   0631  08/06/18   1110   POCGLU  167*  137  115  164*       Blood Sugar Average: Last 72 hrs:  (P) 075 [id number]     Continue insulin, monitor Accu-Cheks  BG controlled   Lantus to 40 units b i d

## 2018-08-07 PROBLEM — R33.9 URINARY RETENTION: Status: ACTIVE | Noted: 2018-08-07

## 2018-08-07 LAB
ALBUMIN SERPL BCP-MCNC: 2.3 G/DL (ref 3.5–5)
ALP SERPL-CCNC: 44 U/L (ref 46–116)
ALT SERPL W P-5'-P-CCNC: 10 U/L (ref 12–78)
ANION GAP SERPL CALCULATED.3IONS-SCNC: 4 MMOL/L (ref 4–13)
AST SERPL W P-5'-P-CCNC: 13 U/L (ref 5–45)
BASOPHILS # BLD AUTO: 0.04 THOUSANDS/ΜL (ref 0–0.1)
BASOPHILS NFR BLD AUTO: 0 % (ref 0–1)
BILIRUB SERPL-MCNC: 0.5 MG/DL (ref 0.2–1)
BUN SERPL-MCNC: 10 MG/DL (ref 5–25)
CALCIUM SERPL-MCNC: 7.9 MG/DL (ref 8.3–10.1)
CHLORIDE SERPL-SCNC: 103 MMOL/L (ref 100–108)
CO2 SERPL-SCNC: 31 MMOL/L (ref 21–32)
CREAT SERPL-MCNC: 0.6 MG/DL (ref 0.6–1.3)
EOSINOPHIL # BLD AUTO: 0.22 THOUSAND/ΜL (ref 0–0.61)
EOSINOPHIL NFR BLD AUTO: 2 % (ref 0–6)
ERYTHROCYTE [DISTWIDTH] IN BLOOD BY AUTOMATED COUNT: 16.1 % (ref 11.6–15.1)
GFR SERPL CREATININE-BSD FRML MDRD: 110 ML/MIN/1.73SQ M
GLUCOSE SERPL-MCNC: 138 MG/DL (ref 65–140)
GLUCOSE SERPL-MCNC: 146 MG/DL (ref 65–140)
GLUCOSE SERPL-MCNC: 186 MG/DL (ref 65–140)
GLUCOSE SERPL-MCNC: 193 MG/DL (ref 65–140)
GLUCOSE SERPL-MCNC: 202 MG/DL (ref 65–140)
HCT VFR BLD AUTO: 28.7 % (ref 36.5–49.3)
HGB BLD-MCNC: 9.3 G/DL (ref 12–17)
IMM GRANULOCYTES # BLD AUTO: 0.03 THOUSAND/UL (ref 0–0.2)
IMM GRANULOCYTES NFR BLD AUTO: 0 % (ref 0–2)
LYMPHOCYTES # BLD AUTO: 1.3 THOUSANDS/ΜL (ref 0.6–4.47)
LYMPHOCYTES NFR BLD AUTO: 14 % (ref 14–44)
MCH RBC QN AUTO: 27 PG (ref 26.8–34.3)
MCHC RBC AUTO-ENTMCNC: 32.4 G/DL (ref 31.4–37.4)
MCV RBC AUTO: 83 FL (ref 82–98)
MONOCYTES # BLD AUTO: 0.72 THOUSAND/ΜL (ref 0.17–1.22)
MONOCYTES NFR BLD AUTO: 8 % (ref 4–12)
NEUTROPHILS # BLD AUTO: 7.17 THOUSANDS/ΜL (ref 1.85–7.62)
NEUTS SEG NFR BLD AUTO: 76 % (ref 43–75)
NRBC BLD AUTO-RTO: 0 /100 WBCS
PLATELET # BLD AUTO: 261 THOUSANDS/UL (ref 149–390)
PMV BLD AUTO: 9.5 FL (ref 8.9–12.7)
POTASSIUM SERPL-SCNC: 3.5 MMOL/L (ref 3.5–5.3)
PROT SERPL-MCNC: 6.5 G/DL (ref 6.4–8.2)
RBC # BLD AUTO: 3.44 MILLION/UL (ref 3.88–5.62)
SODIUM SERPL-SCNC: 138 MMOL/L (ref 136–145)
WBC # BLD AUTO: 9.48 THOUSAND/UL (ref 4.31–10.16)

## 2018-08-07 PROCEDURE — 99024 POSTOP FOLLOW-UP VISIT: CPT | Performed by: SURGERY

## 2018-08-07 PROCEDURE — 82948 REAGENT STRIP/BLOOD GLUCOSE: CPT

## 2018-08-07 PROCEDURE — 0T9B70Z DRAINAGE OF BLADDER WITH DRAINAGE DEVICE, VIA NATURAL OR ARTIFICIAL OPENING: ICD-10-PCS | Performed by: HOSPITALIST

## 2018-08-07 PROCEDURE — 85025 COMPLETE CBC W/AUTO DIFF WBC: CPT | Performed by: HOSPITALIST

## 2018-08-07 PROCEDURE — 99232 SBSQ HOSP IP/OBS MODERATE 35: CPT | Performed by: HOSPITALIST

## 2018-08-07 PROCEDURE — 80053 COMPREHEN METABOLIC PANEL: CPT | Performed by: HOSPITALIST

## 2018-08-07 RX ADMIN — DOCUSATE SODIUM 100 MG: 100 CAPSULE, LIQUID FILLED ORAL at 17:13

## 2018-08-07 RX ADMIN — INSULIN GLARGINE 15 UNITS: 100 INJECTION, SOLUTION SUBCUTANEOUS at 08:51

## 2018-08-07 RX ADMIN — MIDODRINE HYDROCHLORIDE 5 MG: 5 TABLET ORAL at 12:10

## 2018-08-07 RX ADMIN — MIDODRINE HYDROCHLORIDE 5 MG: 5 TABLET ORAL at 08:30

## 2018-08-07 RX ADMIN — INSULIN GLARGINE 15 UNITS: 100 INJECTION, SOLUTION SUBCUTANEOUS at 22:11

## 2018-08-07 RX ADMIN — Medication 325 MG: at 17:08

## 2018-08-07 RX ADMIN — POLYVINYL ALCOHOL 1 DROP: 14 SOLUTION/ DROPS OPHTHALMIC at 22:12

## 2018-08-07 RX ADMIN — ATORVASTATIN CALCIUM 80 MG: 80 TABLET, FILM COATED ORAL at 17:08

## 2018-08-07 RX ADMIN — FUROSEMIDE 20 MG: 20 TABLET ORAL at 08:31

## 2018-08-07 RX ADMIN — CLOPIDOGREL BISULFATE 75 MG: 75 TABLET, FILM COATED ORAL at 08:31

## 2018-08-07 RX ADMIN — INSULIN LISPRO 1 UNITS: 100 INJECTION, SOLUTION INTRAVENOUS; SUBCUTANEOUS at 22:11

## 2018-08-07 RX ADMIN — POLYVINYL ALCOHOL 1 DROP: 14 SOLUTION/ DROPS OPHTHALMIC at 08:43

## 2018-08-07 RX ADMIN — TAMSULOSIN HYDROCHLORIDE 0.4 MG: 0.4 CAPSULE ORAL at 17:08

## 2018-08-07 RX ADMIN — METOCLOPRAMIDE HYDROCHLORIDE 5 MG: 10 TABLET ORAL at 08:29

## 2018-08-07 RX ADMIN — MIDODRINE HYDROCHLORIDE 5 MG: 5 TABLET ORAL at 17:08

## 2018-08-07 RX ADMIN — PANTOPRAZOLE SODIUM 40 MG: 40 TABLET, DELAYED RELEASE ORAL at 05:00

## 2018-08-07 RX ADMIN — ENOXAPARIN SODIUM 40 MG: 100 INJECTION SUBCUTANEOUS at 08:32

## 2018-08-07 RX ADMIN — FLUDROCORTISONE ACETATE 0.1 MG: 0.1 TABLET ORAL at 08:31

## 2018-08-07 RX ADMIN — Medication 325 MG: at 08:31

## 2018-08-07 RX ADMIN — INSULIN LISPRO 2 UNITS: 100 INJECTION, SOLUTION INTRAVENOUS; SUBCUTANEOUS at 12:09

## 2018-08-07 RX ADMIN — POLYVINYL ALCOHOL 1 DROP: 14 SOLUTION/ DROPS OPHTHALMIC at 17:18

## 2018-08-07 RX ADMIN — INSULIN LISPRO 2 UNITS: 100 INJECTION, SOLUTION INTRAVENOUS; SUBCUTANEOUS at 17:14

## 2018-08-07 RX ADMIN — CHLORHEXIDINE GLUCONATE 15 ML: 1.2 RINSE ORAL at 08:32

## 2018-08-07 NOTE — RESTORATIVE TECHNICIAN NOTE
Restorative Specialist Mobility Note       Activity: Ambulate in room, Chair, Dangle, Stand at bedside (Educated/encouraged pt to ambulate with assistance 3-4 x's/day  Chair alarm on   Pt callbell, phone/tray within reach )     Assistive Device: Front wheel walker (R surgical shoe on)       Johnathan MALAVE, Restorative Technician, [employer] [facility]

## 2018-08-07 NOTE — PROGRESS NOTES
Progress Note - Shay Duron 1960, 60 y o  male MRN: [id number]    Unit/Bed#: [facility] 724-01 Encounter: [id number]    Primary Care Provider: Trevon Montero DO   Date and time admitted to hospital: 7/26/2018  5:03 PM      * S/P BKA (below knee amputation), left [facility])   Assessment & Plan    · S/p Left BKA on 8/3/18 by vascular  · Drain in place - 155 cc OP  · No sig pain  · Pt concerned about ability to walk on right leg with has significant numbness & neuropathy  · S/p IV antibiotics, now off them per ID  Monitor without Abx post BKA  · PT eval               Urinary retention   Assessment & Plan    · H/o rentention & jacobs in past  · Had retention post op this time as well  · Will give voiding trial today  · PT eval  · If fails will need reinsertion  · Cont flomax        Obstructive sleep apnea   Assessment & Plan    CPAP noncompliant        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)   Assessment & Plan    Lab Results   Component Value Date    HGBA1C 7 9 (H) 07/27/2018    Hemoglobin A1c for tomorrow  Check blood sugars before meals and bedtime  Insulin sliding scale  Recent Labs      08/06/18   2050  08/07/18   0628  08/07/18   1044  08/07/18   1555   POCGLU  211*  146*  193*  186*       Blood Sugar Average: Last 72 hrs:  (P) 151 6875     Continue insulin, monitor Accu-Cheks  BG controlled   Lantus to 40 units b i d  Other constipation   Assessment & Plan    Fleet enema- prn  Stool softeners  He reportedly has autonomic dysfunction which affects his bowels           Triple vessel coronary artery disease   Assessment & Plan    · S/p CABG x 3 on 3/28/18  · Continue Plavix statin        Orthostatic hypotension   Assessment & Plan    · Cont florinef, midodrine  · On tamsulosin        PAD (peripheral artery disease) ([facility] [address] )   Assessment & Plan    On statin Plavix        Benign essential HTN   Assessment & Plan    Continue monitoring  Continue current meds          [facility] Internal Medicine Progress Note  Patient: Feliberto Marie 60 y o  male   MRN: [id number]  PCP: Chito Villanueva DO  Unit/Bed#: [facility] 724-01 Encounter: [id number]  Date Of Visit: 18    Assessment:    Principal Problem:    S/P BKA (below knee amputation), left (HCC)  Active Problems:    Benign essential HTN    PAD (peripheral artery disease) ([facility])    Orthostatic hypotension    Triple vessel coronary artery disease    Other constipation    Diabetic ulcer of left heel associated with type 2 diabetes mellitus, limited to breakdown of skin ([facility] [address] )    Obstructive sleep apnea    Urinary retention      VTE Pharmacologic Prophylaxis:   Pharmacologic: Enoxaparin (Lovenox)  Mechanical VTE Prophylaxis in Place: Yes    Patient Centered Rounds: I have performed bedside rounds with nursing staff today  Discussions with Specialists or Other Care Team Provider:     Education and Discussions with Family / Patient: patient    Time Spent for Care: 30 minutes  More than 50% of total time spent on counseling and coordination of care as described above  Current Length of Stay: 12 day(s)    Current Patient Status: Inpatient   Certification Statement: The patient will continue to require additional inpatient hospital stay due to once cleared by vascular    Discharge Plan / Estimated Discharge Date: once cleared by vascular    Code Status: Level 1 - Full Code      Subjective:   Feels ok, no CP/SOB, no n/v, no BM today    Objective:     Vitals:   Temp (24hrs), Av 3 °F (36 8 °C), Min:97 9 °F (36 6 °C), Max:98 6 °F (37 °C)    HR:  [77-84] 77  Resp:  [18] 18  BP: (104-147)/(58-62) 147/62  SpO2:  [95 %-99 %] 99 %  Body mass index is 34 29 kg/m²  Input and Output Summary (last 24 hours):        Intake/Output Summary (Last 24 hours) at 18 1743  Last data filed at 18 1720   Gross per 24 hour   Intake             1660 ml   Output             2655 ml   Net             -995 ml       Physical Exam: Physical Exam   Constitutional: He is oriented to person, place, and time  He appears well-developed and well-nourished  HENT:   Head: Normocephalic  Eyes: Conjunctivae are normal  No scleral icterus  Cardiovascular: Normal rate, regular rhythm and normal heart sounds  Exam reveals no gallop and no friction rub  No murmur heard  Pulmonary/Chest: Effort normal and breath sounds normal  No respiratory distress  He has no wheezes  Abdominal: Soft  He exhibits no distension  There is no tenderness  Musculoskeletal: He exhibits no edema  Left BKA dressing   Neurological: He is alert and oriented to person, place, and time  Additional Data:     Labs:      Results from last 7 days  Lab Units 08/07/18  0451   WBC Thousand/uL 9 48   HEMOGLOBIN g/dL 9 3*   HEMATOCRIT % 28 7*   PLATELETS Thousands/uL 261   NEUTROS PCT % 76*   LYMPHS PCT % 14   MONOS PCT % 8   EOS PCT % 2       Results from last 7 days  Lab Units 08/07/18  0451   SODIUM mmol/L 138   POTASSIUM mmol/L 3 5   CHLORIDE mmol/L 103   CO2 mmol/L 31   BUN mg/dL 10   CREATININE mg/dL 0 60   CALCIUM mg/dL 7 9*   TOTAL PROTEIN g/dL 6 5   BILIRUBIN TOTAL mg/dL 0 50   ALK PHOS U/L 44*   ALT U/L 10*   AST U/L 13   GLUCOSE RANDOM mg/dL 138           * I Have Reviewed All Lab Data Listed Above  * Additional Pertinent Lab Tests Reviewed:  All Labs Within Last 24 Hours Reviewed    Imaging:    Imaging Reports Reviewed Today Include:   Imaging Personally Reviewed by Myself Includes:      Recent Cultures (last 7 days):           Last 24 Hours Medication List:     Current Facility-Administered Medications:  acetaminophen 650 mg Oral Q6H PRN Donnell Hoang MD   aluminum-magnesium hydroxide-simethicone 15 mL Oral Q6H PRN Donnell Hoang MD   atorvastatin 80 mg Oral Daily With Gee Yu MD   bisacodyl 10 mg Rectal Daily PRN Ruy Jara MD   bisacodyl 10 mg Rectal Once Paulo Montiel PA-C   bromfenac sodium 1 drop Right Eye Daily Felicity Mcbride, MD   calcium carbonate 1,000 mg Oral Daily PRN Marisol Canada PA-C   clopidogrel 75 mg Oral Daily Jeral Gain, MD   docusate sodium 100 mg Oral BID Jeral Gain, MD   enoxaparin 40 mg Subcutaneous Daily Cleo Mckeon MD   ferrous sulfate 325 mg Oral BID With Meals Jeral Gain, MD   fludrocortisone 0 1 mg Oral Daily Jeral Gain, MD   furosemide 20 mg Oral Daily Jeral Gain, MD   hydrALAZINE 5 mg Intravenous Q6H PRN Caridad Youngblood PA-C   HYDROmorphone 0 5 mg Intravenous Q3H PRN Cleo Mckeon MD   insulin glargine 15 Units Subcutaneous Q12H [address] Fabiola Barraza, CRNP   insulin lispro 1-5 Units Subcutaneous HS Jeral Gain, MD   insulin lispro 2-12 Units Subcutaneous TID AC Jeral Gain, MD   lidocaine viscous 15 mL Swish & Swallow 4x Daily PRN Marisol Canada PA-C   metoclopramide 10 mg Intravenous Q6H PRN Belkis Mcgovern MD   metoclopramide 5 mg Oral Daily Jeral Gain, MD   midodrine 5 mg Oral TID With Meals Jeral Gain, MD   ondansetron 4 mg Intravenous Q6H PRN Belkis Mcgovern MD   oxyCODONE 10 mg Oral Q4H PRN Cleo Mckeon MD   oxyCODONE 5 mg Oral Q4H PRN Cleo Mckeon MD   pantoprazole 40 mg Oral Early Morning Belkis Mcgovern, MD   polyethylene glycol 17 g Oral Daily Wayne Copeland MD   polyvinyl alcohol 1 drop Right Eye TID Julia Doctor, NUNO   senna 1 tablet Oral HS Alfredo Nail, NUNO   sodium chloride (PF) 3 mL Intravenous PRN Rosi Louis DO   tamsulosin 0 4 mg Oral Daily With Aditya Garcia MD        Today, Patient Was Seen By: Maria Esther Schneider MD    ** Please Note: This note has been constructed using a voice recognition system   **

## 2018-08-07 NOTE — ASSESSMENT & PLAN NOTE
· S/p Left BKA on 8/3/18 by vascular  · Drain in place - 155 cc OP  · No sig pain  · Pt concerned about ability to walk on right leg with has significant numbness & neuropathy  · S/p IV antibiotics, now off them per ID   Monitor without Abx post BKA  · PT eval

## 2018-08-07 NOTE — PROGRESS NOTES
Progress Note - Vascular Surgery   Barton Bank 60 y o  male MRN: [id number]  [facility] Encounter: [id number]    Assessment:  58M with PAD and non salvaable L foot wound s/p L BKA on 8/3 - POD #4    Plan:  - Stump Changes  - Monitor SUREKHA drain  - Pain control PRN  - Consider D/C jacobs and void trial  - Continue care per primary    Subjective/Objective   Chief Complaint:     Subjective: Patient with no acute events over night  Tolerating PO  Patient initially post-op failed a urination trial and now has a jacobs  Patient is urinating well through catheter  Denies fevers, chills, nausea, vomiting, SOB, chest pain  Does get occasionally orthopnea when sitting up at edge of bed  Normally takes midodrine  Objective:     Blood pressure 146/58, pulse 84, temperature 97 9 °F (36 6 °C), temperature source Oral, resp  rate 18, height 6' 1" (1 854 m), weight 118 kg (261 lb 0 4 oz), SpO2 95 %  ,Body mass index is 34 44 kg/m²  Intake/Output Summary (Last 24 hours) at 08/07/18 0412  Last data filed at 08/06/18 2321   Gross per 24 hour   Intake             1600 ml   Output             1555 ml   Net               45 ml       Invasive Devices     Peripheral Intravenous Line            Peripheral IV 08/03/18 Right Wrist 3 days          Drain            Closed/Suction Drain Left Leg Bulb 19 Fr  3 days    Urethral Catheter 18 Fr  2 days                Physical Exam: General: AAOx3  Respiratory: BS b/l  Abdomen: Soft, NT, no rebound/guarding, bowel sounds present  Heart: RRR, S1s2  Ext: Left BKA stump - c/d/i  SUREKHA drain - with serosanguinous output - 105 mL out as of 445AM    Femoral Pulses palpable bilaterally    Lab, Imaging and other studies:  I have personally reviewed pertinent lab results    , CBC:   Lab Results   Component Value Date    WBC 9 74 08/06/2018    HGB 8 8 (L) 08/06/2018    HCT 29 0 (L) 08/06/2018    MCV 86 08/06/2018     08/06/2018    MCH 26 0 (L) 08/06/2018    MCHC 30 3 (L) 08/06/2018    RDW 16 1 (H) 08/06/2018    MPV 9 8 08/06/2018    NRBC 0 08/06/2018   , CMP:   Lab Results   Component Value Date     08/06/2018    K 3 6 08/06/2018     08/06/2018    CO2 29 08/06/2018    ANIONGAP 6 08/06/2018    BUN 10 08/06/2018    CREATININE 0 59 (L) 08/06/2018    GLUCOSE 112 08/06/2018    CALCIUM 7 9 (L) 08/06/2018    AST 17 08/06/2018    ALT 11 (L) 08/06/2018    ALKPHOS 41 (L) 08/06/2018    PROT 6 4 08/06/2018    BILITOT 0 43 08/06/2018    EGFR 111 08/06/2018     VTE Pharmacologic Prophylaxis: Enoxaparin (Lovenox)  VTE Mechanical Prophylaxis: sequential compression device

## 2018-08-07 NOTE — ASSESSMENT & PLAN NOTE
Lab Results   Component Value Date    HGBA1C 7 9 (H) 07/27/2018    Hemoglobin A1c for tomorrow  Check blood sugars before meals and bedtime  Insulin sliding scale  Recent Labs      08/06/18 2050  08/07/18   0628  08/07/18   1044  08/07/18   1555   POCGLU  211*  146*  193*  186*       Blood Sugar Average: Last 72 hrs:  (P) 151 9285     Continue insulin, monitor Accu-Cheks  BG controlled   Lantus to 40 units b i d

## 2018-08-07 NOTE — ASSESSMENT & PLAN NOTE
· H/o rentention & jacobs in past  · Had retention post op this time as well  · Will give voiding trial today  · PT eval  · If fails will need reinsertion  · Cont flomax

## 2018-08-08 LAB
GLUCOSE SERPL-MCNC: 151 MG/DL (ref 65–140)
GLUCOSE SERPL-MCNC: 163 MG/DL (ref 65–140)
GLUCOSE SERPL-MCNC: 166 MG/DL (ref 65–140)
GLUCOSE SERPL-MCNC: 188 MG/DL (ref 65–140)

## 2018-08-08 PROCEDURE — G8979 MOBILITY GOAL STATUS: HCPCS

## 2018-08-08 PROCEDURE — G8987 SELF CARE CURRENT STATUS: HCPCS

## 2018-08-08 PROCEDURE — 97112 NEUROMUSCULAR REEDUCATION: CPT

## 2018-08-08 PROCEDURE — G8978 MOBILITY CURRENT STATUS: HCPCS

## 2018-08-08 PROCEDURE — 97163 PT EVAL HIGH COMPLEX 45 MIN: CPT

## 2018-08-08 PROCEDURE — 99232 SBSQ HOSP IP/OBS MODERATE 35: CPT | Performed by: HOSPITALIST

## 2018-08-08 PROCEDURE — 99024 POSTOP FOLLOW-UP VISIT: CPT | Performed by: SURGERY

## 2018-08-08 PROCEDURE — 82948 REAGENT STRIP/BLOOD GLUCOSE: CPT

## 2018-08-08 PROCEDURE — G8988 SELF CARE GOAL STATUS: HCPCS

## 2018-08-08 PROCEDURE — 97167 OT EVAL HIGH COMPLEX 60 MIN: CPT

## 2018-08-08 RX ADMIN — DOCUSATE SODIUM 100 MG: 100 CAPSULE, LIQUID FILLED ORAL at 17:29

## 2018-08-08 RX ADMIN — Medication 325 MG: at 17:28

## 2018-08-08 RX ADMIN — CLOPIDOGREL BISULFATE 75 MG: 75 TABLET, FILM COATED ORAL at 08:45

## 2018-08-08 RX ADMIN — DOCUSATE SODIUM 100 MG: 100 CAPSULE, LIQUID FILLED ORAL at 08:45

## 2018-08-08 RX ADMIN — PANTOPRAZOLE SODIUM 40 MG: 40 TABLET, DELAYED RELEASE ORAL at 06:02

## 2018-08-08 RX ADMIN — POLYVINYL ALCOHOL 1 DROP: 14 SOLUTION/ DROPS OPHTHALMIC at 17:29

## 2018-08-08 RX ADMIN — METOCLOPRAMIDE HYDROCHLORIDE 5 MG: 10 TABLET ORAL at 08:45

## 2018-08-08 RX ADMIN — INSULIN LISPRO 1 UNITS: 100 INJECTION, SOLUTION INTRAVENOUS; SUBCUTANEOUS at 21:43

## 2018-08-08 RX ADMIN — INSULIN LISPRO 2 UNITS: 100 INJECTION, SOLUTION INTRAVENOUS; SUBCUTANEOUS at 08:47

## 2018-08-08 RX ADMIN — INSULIN GLARGINE 15 UNITS: 100 INJECTION, SOLUTION SUBCUTANEOUS at 21:42

## 2018-08-08 RX ADMIN — ATORVASTATIN CALCIUM 80 MG: 80 TABLET, FILM COATED ORAL at 17:29

## 2018-08-08 RX ADMIN — MIDODRINE HYDROCHLORIDE 5 MG: 5 TABLET ORAL at 11:34

## 2018-08-08 RX ADMIN — INSULIN GLARGINE 15 UNITS: 100 INJECTION, SOLUTION SUBCUTANEOUS at 08:44

## 2018-08-08 RX ADMIN — ENOXAPARIN SODIUM 40 MG: 100 INJECTION SUBCUTANEOUS at 08:45

## 2018-08-08 RX ADMIN — Medication 325 MG: at 08:44

## 2018-08-08 RX ADMIN — TAMSULOSIN HYDROCHLORIDE 0.4 MG: 0.4 CAPSULE ORAL at 17:28

## 2018-08-08 RX ADMIN — MIDODRINE HYDROCHLORIDE 5 MG: 5 TABLET ORAL at 08:45

## 2018-08-08 RX ADMIN — FLUDROCORTISONE ACETATE 0.1 MG: 0.1 TABLET ORAL at 08:44

## 2018-08-08 RX ADMIN — INSULIN LISPRO 2 UNITS: 100 INJECTION, SOLUTION INTRAVENOUS; SUBCUTANEOUS at 11:29

## 2018-08-08 RX ADMIN — POLYVINYL ALCOHOL 1 DROP: 14 SOLUTION/ DROPS OPHTHALMIC at 08:46

## 2018-08-08 RX ADMIN — MIDODRINE HYDROCHLORIDE 5 MG: 5 TABLET ORAL at 17:28

## 2018-08-08 RX ADMIN — INSULIN LISPRO 2 UNITS: 100 INJECTION, SOLUTION INTRAVENOUS; SUBCUTANEOUS at 17:29

## 2018-08-08 RX ADMIN — FUROSEMIDE 20 MG: 20 TABLET ORAL at 08:45

## 2018-08-08 NOTE — ASSESSMENT & PLAN NOTE
Lab Results   Component Value Date    HGBA1C 7 9 (H) 07/27/2018    Hemoglobin A1c for tomorrow  Check blood sugars before meals and bedtime  Insulin sliding scale  Recent Labs      08/07/18 2058  08/08/18   0635  08/08/18   1057  08/08/18   1610   POCGLU  202*  151*  166*  188*       Blood Sugar Average: Last 72 hrs:  (P) 162 0625     Continue insulin, monitor Accu-Cheks  BG controlled   Lantus to 40 units b i d

## 2018-08-08 NOTE — ASSESSMENT & PLAN NOTE
· H/o rentention & jacobs in past  · Had retention post op this time as well  · Off jacobs, cont retention protocol  · Cont flomax

## 2018-08-08 NOTE — RESTORATIVE TECHNICIAN NOTE
Restorative Specialist Mobility Note       Activity: Ambulate in room, Chair, Stand at bedside, Dangle (Educated/encouraged pt to ambulate with assistance 3-4 x's/day  Chair alarm on   Pt callbell, phone/tray within reach )     Assistive Device: Front wheel walker (R surgical shoe on)       Abebe MALAVE, Restorative Technician, [employer] [facility]

## 2018-08-08 NOTE — PROGRESS NOTES
Progress Note - Td Addison 1960, 60 y o  male MRN: [id number]    Unit/Bed#: [facility] 724-01 Encounter: [id number]    Primary Care Provider: Ralph Cantrell DO   Date and time admitted to hospital: 7/26/2018  5:03 PM        * S/P BKA (below knee amputation), left [facility])   Assessment & Plan    · S/p Left BKA on 8/3/18 by vascular  · Drain removed & cleared by vascular for DC  · No significant pain  · Pt concerned about ability to walk on right leg with has significant numbness & neuropathy  · S/p IV antibiotics, now off them per ID  Monitor without Abx post BKA  · PT eval               Urinary retention   Assessment & Plan    · H/o rentention & jacobs in past  · Had retention post op this time as well  · Off jacobs, cont retention protocol  · Cont flomax        Diabetic ulcer of left heel associated with type 2 diabetes mellitus, limited to breakdown of skin [facility])   Assessment & Plan    Lab Results   Component Value Date    HGBA1C 7 9 (H) 07/27/2018    Hemoglobin A1c for tomorrow  Check blood sugars before meals and bedtime  Insulin sliding scale  Recent Labs      08/07/18   2058  08/08/18   0635  08/08/18   1057  08/08/18   1610   POCGLU  202*  151*  166*  188*       Blood Sugar Average: Last 72 hrs:  (P) 162 0625     Continue insulin, monitor Accu-Cheks  BG controlled   Lantus to 40 units b i d  Other constipation   Assessment & Plan    · Fleet enema- prn  · per patient nothing else works  He reportedly has autonomic dysfunction which affects his bowels           Triple vessel coronary artery disease   Assessment & Plan    · S/p CABG x 3 on 3/28/18  · Continue Plavix statin        Orthostatic hypotension   Assessment & Plan    · Cont florinef, midodrine  · On tamsulosin        PAD (peripheral artery disease) ([facility] [address] )   Assessment & Plan    On statin Plavix        Benign essential HTN   Assessment & Plan    Continue monitoring  Continue current meds          [facility] Internal Medicine Progress Note  Patient: Yannick Ennis 60 y o  male   MRN: [id number]  PCP: Christiana Jessica DO  [facility] Encounter: [id number]  Date Of Visit: 18    Assessment:    Principal Problem:    S/P BKA (below knee amputation), left (HCC)  Active Problems:    Benign essential HTN    PAD (peripheral artery disease) ([facility])    Orthostatic hypotension    Triple vessel coronary artery disease    Other constipation    Diabetic ulcer of left heel associated with type 2 diabetes mellitus, limited to breakdown of skin ([facility] [address] )    Obstructive sleep apnea    Urinary retention        VTE Pharmacologic Prophylaxis:   Pharmacologic: Enoxaparin (Lovenox)  Mechanical VTE Prophylaxis in Place: Yes    Patient Centered Rounds: I have performed bedside rounds with nursing staff today  Discussions with Specialists or Other Care Team Provider:     Education and Discussions with Family / Patient: patient    Time Spent for Care: 30 minutes  More than 50% of total time spent on counseling and coordination of care as described above  Current Length of Stay: 13 day(s)    Current Patient Status: Inpatient   Certification Statement: The patient will continue to require additional inpatient hospital stay due to awaiting rehab    Discharge Plan / Estimated Discharge Date: ready    Code Status: Level 1 - Full Code      Subjective:   Feels fine , no complains    Objective:     Vitals:   Temp (24hrs), Av 3 °F (36 8 °C), Min:98 2 °F (36 8 °C), Max:98 3 °F (36 8 °C)    HR:  [75-76] 75  Resp:  [18] 18  BP: (120-141)/(68-74) 120/68  SpO2:  [96 %-98 %] 96 %  Body mass index is 32 81 kg/m²  Input and Output Summary (last 24 hours): Intake/Output Summary (Last 24 hours) at 18 4245  Last data filed at 18 1500   Gross per 24 hour   Intake             1090 ml   Output             1511 ml   Net             -421 ml       Physical Exam:     Physical Exam   Constitutional: He is oriented to person, place, and time   He appears well-developed and well-nourished  HENT:   Head: Normocephalic and atraumatic  Eyes: Conjunctivae are normal  No scleral icterus  Cardiovascular: Normal rate, regular rhythm and normal heart sounds  Exam reveals no gallop and no friction rub  No murmur heard  Pulmonary/Chest: Effort normal and breath sounds normal  No respiratory distress  He has no wheezes  He has no rales  Abdominal: Soft  He exhibits no distension  There is no tenderness  Musculoskeletal:   Left BKA   Neurological: He is alert and oriented to person, place, and time  Vitals reviewed  Additional Data:     Labs:      Results from last 7 days  Lab Units 08/07/18  0451   WBC Thousand/uL 9 48   HEMOGLOBIN g/dL 9 3*   HEMATOCRIT % 28 7*   PLATELETS Thousands/uL 261   NEUTROS PCT % 76*   LYMPHS PCT % 14   MONOS PCT % 8   EOS PCT % 2       Results from last 7 days  Lab Units 08/07/18  0451   SODIUM mmol/L 138   POTASSIUM mmol/L 3 5   CHLORIDE mmol/L 103   CO2 mmol/L 31   BUN mg/dL 10   CREATININE mg/dL 0 60   CALCIUM mg/dL 7 9*   TOTAL PROTEIN g/dL 6 5   BILIRUBIN TOTAL mg/dL 0 50   ALK PHOS U/L 44*   ALT U/L 10*   AST U/L 13   GLUCOSE RANDOM mg/dL 138           * I Have Reviewed All Lab Data Listed Above    * Additional Pertinent Lab Tests Reviewed: No New Labs Available For Today    Imaging:    Imaging Reports Reviewed Today Include:   Imaging Personally Reviewed by Myself Includes:      Recent Cultures (last 7 days):           Last 24 Hours Medication List:     Current Facility-Administered Medications:  acetaminophen 650 mg Oral Q6H PRN Tamara Moura MD   aluminum-magnesium hydroxide-simethicone 15 mL Oral Q6H PRN Tamara Moura MD   atorvastatin 80 mg Oral Daily With Alfonso Zuniga MD   bisacodyl 10 mg Rectal Daily PRN Dereje Reynolds MD   bromfenac sodium 1 drop Right Eye Daily Tamara Moura MD   calcium carbonate 1,000 mg Oral Daily PRN Marisol Canada PA-C   clopidogrel 75 mg Oral Daily Tamara Moura MD docusate sodium 100 mg Oral BID Hilary Ortega MD   enoxaparin 40 mg Subcutaneous Daily Maureen Munson MD   ferrous sulfate 325 mg Oral BID With Meals Hilary Ortega MD   fludrocortisone 0 1 mg Oral Daily Hilary Ortega MD   furosemide 20 mg Oral Daily Hilary Ortega MD   insulin glargine 15 Units Subcutaneous Q12H [address] Fabiola Barraza CRNP   insulin lispro 1-5 Units Subcutaneous HS Hilary Ortega MD   insulin lispro 2-12 Units Subcutaneous TID AC Hilary Ortega MD   metoclopramide 10 mg Intravenous Q6H PRN Patricio Bangura MD   metoclopramide 5 mg Oral Daily Hilary Ortega MD   midodrine 5 mg Oral TID With Meals Hilary Ortega MD   ondansetron 4 mg Intravenous Q6H PRN Patricio Bangura MD   pantoprazole 40 mg Oral Early Morning Patricio Bangura MD   polyethylene glycol 17 g Oral Daily Vianney Galindo MD   polyvinyl alcohol 1 drop Right Eye TID Filiberto Powell PA-C   senna 1 tablet Oral HS Wen Wise PA-C   sodium chloride (PF) 3 mL Intravenous PRN DO Calos   tamsulosin 0 4 mg Oral Daily With Afia Wells MD        Today, Patient Was Seen By: Shireen Rayo MD    ** Please Note: This note has been constructed using a voice recognition system   **

## 2018-08-08 NOTE — SOCIAL WORK
Received phone call from Lakeisha Aquino from [facility] who stated that pt has been approved and she submitted for insurance authorization  She informed Cm that she received a phone call from Lucie Coombs at [facility] who stated that they are unable to process an authorization based on therapy maury RODRIGUEZ informed Kalpana Galvan OT, and Castro Shields PT, of same and requested pt be seen as a treat this afternoon or early am   Will follow for d/c needs and inform Lakeisha Aquino once pt is seen for a treat by therapy

## 2018-08-08 NOTE — ASSESSMENT & PLAN NOTE
· S/p Left BKA on 8/3/18 by vascular  · Drain removed & cleared by vascular for DC  · No significant pain  · Pt concerned about ability to walk on right leg with has significant numbness & neuropathy  · S/p IV antibiotics, now off them per ID   Monitor without Abx post BKA  · PT eval

## 2018-08-08 NOTE — PLAN OF CARE
Problem: PHYSICAL THERAPY ADULT  Goal: Performs mobility at highest level of function for planned discharge setting  See evaluation for individualized goals  Treatment/Interventions: Functional transfer training, LE strengthening/ROM, ADL retraining, Therapeutic exercise, Endurance training, Equipment eval/education, Bed mobility  Equipment Recommended: Walker (RW)       See flowsheet documentation for full assessment, interventions and recommendations  Outcome: Progressing  Prognosis: Good  Problem List: Decreased strength, Decreased endurance, Impaired balance, Decreased mobility, Impaired sensation, Pain, Decreased skin integrity, Obesity  Assessment: PT INITIATED TREATMENT SESSION IN ORDER TO ASSIST PATIENT IN ACHIEVING GOALS TO IMPROVE TRANSFERS AND ACTIVITY TOLERANCE  PATIENT'S SELF REPORTED GOAL FOR SESSION IS TO GET BACK TO BED (OOB IN CHAIR UPON ARRIVAL- PER PATIENT HOPPED WITH RW AND TWO PERSON ASSIST)  PATIENT DENIES PAIN (DENIES FEELING BELOW KNEE IN R AND L LE) HOWEVER DOES REPORT PHANTOM PAIN  PT ENCOURAGED PATIENT TO KEEP LIMB UNCOVERED AND VISIBLE AND TO PERFORM DENSENSITIZATION TECHNIQUES- PATIENT UNABLE TO REACH WITH B/L UE TO TOUCH RESIDUAL LIMB AND SO THERAPIST PERFORMED RUBBING TO AID IN REDUCTION OF PHANTOM PAIN  PATIENT PERFORMED SIT PIVOT TRANSFER DROP ARM CHAIR-->BED MIN-AX1 W/ SIGNIFICANT INCREASED TIME (LIMITED BY FATIGUE)  SINCE PATIENT DOES NOT HAVE SENSATION IN R LE- ENCOURAGED PATIENT TO IMPROVE ABILITY TO PERFORM SIT PIVOT/SLIDEBOARD TRANSFERS RATHER THAN HOPPING ON R LE  PLAN TO ASSESS WC MOBILITY IN FUTURE SESSIONS TO ENCOURAGE COMMUNITY LEVEL MOBILITY  RECOMMENDATION FOR INPT REHAB REMAINS APPROPRIATE  PATIENT WILL BENEFIT FROM CONTINUED SKILLED PT THIS ADMISSION TO ACHIEVE MAXIMAL FUNCTION AND SAFETY  Recommendation: (S) Post acute IP rehab     PT - OK to Discharge: (S) Yes (TO INPT REHAB WHEN MED RAYMUNDO )    See flowsheet documentation for full assessment     Geoff Cárdenas Sreekanth Diaz, PT

## 2018-08-08 NOTE — ASSESSMENT & PLAN NOTE
· Fleet enema- prn  · per patient nothing else works  He reportedly has autonomic dysfunction which affects his bowels

## 2018-08-08 NOTE — DISCHARGE INSTRUCTIONS
DISCHARGE INSTRUCTIONS  LOWER EXTREMITY AMPUTATION    Following discharge from the hospital, you may have some questions about your operation, your activities or your general condition  These instructions may answer some of your questions and help you adjust during the first few weeks following your operation  REHABILITATION:   All patients require some form of rehabilitation after this procedure  This will assist with your return to daily activities by incorporating exercise and balance training  This may be in the form of visiting nurses and physical therapy in your home  However, admission to a rehabilitation facility is also common for a period of time before you return home  ACTIVITY:  Your limitations for activity will be discussed prior to discharge  Physical therapy and rehab staff will direct progression of your activity based on your progression in the physical therapy  Please follow their recommendations closely  DIET:  Resume your normal diet  Try to eat low fat and low cholesterol foods  INCISION:      Wash incisions daily w/ soap and water  Pat dry thoroughly  Cover w/ ABD pad & ACE wrap to assist in edema control/ stump shrinkage  It is normal to have swelling or discoloration around the incision  If increasing redness or pain develops, call our office immediately  You will have stitches or staples present after your surgery and these will be evaluated at your first post-operative visit  If any of your incisions are open and require dressing changes, you will be given instructions for your daily incision care  If you are not able to change the dressings, a visiting nurse will be arranged  Call for appt to be seen within 1 month    PLEASE CALL THE OFFICE IF YOU HAVE ANY QUESTIONS  [phone] [facility] [phone] [facility] [phone]  [address] [place] [address] , [address] [place] [address], [address] [place] [address] 99, [facility], [address]  [facility]6 W   [address] [facility], [address]  [address], 1st Floor, Siri Murphy 34  [facility] 19, [address] [facility], [address], [facility] 97   [address] [facility], [address] [facility], [address] [facility], [address] [facility], [facility], [address], Angel Mcgee 6

## 2018-08-08 NOTE — PLAN OF CARE
Problem: PHYSICAL THERAPY ADULT  Goal: Performs mobility at highest level of function for planned discharge setting  See evaluation for individualized goals  Treatment/Interventions: Functional transfer training, LE strengthening/ROM, ADL retraining, Therapeutic exercise, Endurance training, Equipment eval/education, Bed mobility  Equipment Recommended: Walker (RW)       See flowsheet documentation for full assessment, interventions and recommendations  Prognosis: Good  Problem List: Decreased strength, Decreased range of motion, Decreased endurance, Impaired balance, Decreased mobility, Decreased coordination, Pain  Assessment: Patient seen today for a high complexity physical therapy evaluation  Patient is a 60year old male with pmh including CAD, CABG x3, diastolic PAD and diabetes presenting to B on 7/26/18 w/ a non-healing wound of the left heel  On 8/3/18 patient underwent a L BKA and is WBAT on the [facility] w/ a [employer]  Patient lives at home with his wife in a 2 level home w/ 4 VITOR  Patient has a first floor set up and typically uses a walker or w/c at home  Patient in independent in mobility and transfers however, requires assistance w/ bathing, dressing and IADL's  Physical therapy was consulted to assess mobility and discharge recommendation  Upon arrival, patient denied pain and was agreeable to therapy session  Patient required max A x1 for sit to stand transfer w/ RW in front  While standing for 2-3 min, patient requested to sit due to blood pressure  BP was taken at 73/49  After 1-2 min, BP was taken at 132/65  Patient presents w/ decreased static/dynamic balance, impaired strength/ROM, decreased coordination, impaired endurance and activity tolerance and decreased functional mobility  Due to unstable medical status and decline in functional mobility, patient will benefit from skilled physical therapy to address listed impairments  Recommend discharge to rehab when medically stable  Recommendation: Post acute IP rehab     PT - OK to Discharge: (S) Yes (to rehab when stable )    See flowsheet documentation for full assessment

## 2018-08-08 NOTE — PLAN OF CARE
Problem: OCCUPATIONAL THERAPY ADULT  Goal: Performs self-care activities at highest level of function for planned discharge setting  See evaluation for individualized goals  Treatment Interventions: ADL retraining, Functional transfer training, Endurance training, Patient/family training, Equipment evaluation/education, Compensatory technique education, Continued evaluation, Energy conservation          See flowsheet documentation for full assessment, interventions and recommendations  Limitation: Decreased ADL status, Decreased endurance, Decreased high-level ADLs, Decreased self-care trans  Prognosis: Good  Assessment: Pt  is a 60 y o  male who was admitted to [facility] on 7/26/2018 w/ diabetic ulcer on L heel  Pt  Now s/p L BKA on 8/3  Pt  w/a significant PMH/co-morbidities of anxiety and depression, HTN, diabetic neuropathy, HLD, DM, orthostatic hypotension, obesity, CAD, CHF, s/p CABG  Pt  currently lives in a 2 SH with FF s/u with wife  PTA, pt  Mod I in mobility, transfers, toileting, UB ADLs and light beverage/meal prep  However, pt  received A for LB bathing and dressing PRN and wife A with most IADLs excluding above  Currently, pt  requires max-mod Ax2 for functional  transfers, min A-s/u for UB ADLs and total A for LB ADLs  A is needed d/t the following impairments: decreased functional activity tolerance, decreased B/L hand senation, generalized weakness, deconditioning, decreased balance, orthopedic restrictions, + orthostasis, slight impulsivity, decreased attention/concentration to task  Additionally, pt  requires min v c  during functional activity for safety  Therefore, pt  will benefit from skilled OT services to maximize functional gains, monitor status and prevent decline  Will continue to follow pt  3-5x/week to meet the goals described below in 10-14 days  Recommend STR once medically stable  Of note, pt  BP was as follows: standing- 73/49, once seated LE elevated and head reclined:132/65       OT Discharge Recommendation: Short Term Rehab  OT - OK to Discharge:  Yes

## 2018-08-08 NOTE — PHYSICAL THERAPY NOTE
PT TREATMENT       08/08/18 1730   Pain Assessment   Pain Assessment No/denies pain   Pain Score No Pain   Restrictions/Precautions   Weight Bearing Precautions Per Order Yes   RLE Weight Bearing Per Order WBAT  (DARCO SHOE)   LLE Weight Bearing Per Order NWB  (S/P BKA)   Braces or Orthoses (DARCO SHOE R LE)   Other Precautions Fall Risk;WBS; Bed Alarm   General   Chart Reviewed Yes   Response to Previous Treatment Patient with no complaints from previous session  Family/Caregiver Present No   Cognition   Overall Cognitive Status WFL   Subjective   Subjective "I GUESS WE COULD GET BACK TO BED"   Bed Mobility   Sit to Supine 4  Minimal assistance   Additional items Assist x 1; Increased time required;LE management   Transfers   Sit to Stand Unable to assess   Stand to Sit Unable to assess   Sit pivot 4  Minimal assistance  (CONTACT GUARD ASSISTANCE )   Additional items Assist x 1; Increased time required;Verbal cues   Balance   Static Sitting Fair +   Endurance Deficit   Endurance Deficit Yes   Endurance Deficit Description FATIGUE   Activity Tolerance   Activity Tolerance Patient limited by fatigue   Medical Staff Made Aware Josiane Hdez   Nurse Made Aware YI JAFFE    Assessment   Prognosis Good   Problem List Decreased strength;Decreased endurance; Impaired balance;Decreased mobility; Impaired sensation;Pain;Decreased skin integrity;Obesity   Assessment PT INITIATED TREATMENT SESSION IN ORDER TO ASSIST PATIENT IN ACHIEVING GOALS TO IMPROVE TRANSFERS AND ACTIVITY TOLERANCE  PATIENT'S SELF REPORTED GOAL FOR SESSION IS TO GET BACK TO BED (OOB IN CHAIR UPON ARRIVAL- PER PATIENT HOPPED WITH RW AND TWO PERSON ASSIST)  PATIENT DENIES PAIN (DENIES FEELING BELOW KNEE IN R AND L LE) HOWEVER DOES REPORT PHANTOM PAIN   PT ENCOURAGED PATIENT TO KEEP LIMB UNCOVERED AND VISIBLE AND TO PERFORM DENSENSITIZATION TECHNIQUES- PATIENT UNABLE TO REACH WITH B/L UE TO TOUCH RESIDUAL LIMB AND SO THERAPIST PERFORMED RUBBING TO [address] IN REDUCTION OF PHANTOM PAIN  PATIENT PERFORMED SIT PIVOT TRANSFER DROP ARM CHAIR-->BED MIN-AX1 W/ SIGNIFICANT INCREASED TIME (LIMITED BY FATIGUE)  SINCE PATIENT DOES NOT HAVE SENSATION IN R LE- ENCOURAGED PATIENT TO IMPROVE ABILITY TO PERFORM SIT PIVOT/SLIDEBOARD TRANSFERS RATHER THAN HOPPING ON R LE  PLAN TO ASSESS WC MOBILITY IN FUTURE SESSIONS TO ENCOURAGE COMMUNITY LEVEL MOBILITY  RECOMMENDATION FOR INPT REHAB REMAINS APPROPRIATE  PATIENT WILL BENEFIT FROM CONTINUED SKILLED PT THIS ADMISSION TO ACHIEVE MAXIMAL FUNCTION AND SAFETY  Goals   Patient Goals TO GO TO REHAB    STG Expiration Date 08/22/18   Short Term Goal #1 IN ADDITION TO GOALS ESTABLISHED DURING EVALUATION PATIENT WILL 1) PERFORM SIT PIVOT/SLIDEBOARD TRANSFER S LEVEL; 2) PERFORM STAND PIVOT TRANSFER W/ RW MIN-AX1; 3) AMBULATE 20 FEET MIN-AX1 W/ RW; 4) SELF PROPEL MANUAL WC X 200 FEET S LEVEL    Treatment Day 1   Plan   Treatment/Interventions Functional transfer training;LE strengthening/ROM; Therapeutic exercise;Equipment eval/education; Bed mobility;Gait training;OT;Spoke to nursing   Progress Progressing toward goals   PT Frequency 5x/wk  (BID PRN )   Recommendation   Recommendation Post acute IP rehab   Equipment Recommended Wheelchair;Walker  (SLIDEBOARD)   PT - OK to Discharge Yes  (TO INPT REHAB WHEN MED RAYMUNDO )     Real Galdamez, PT

## 2018-08-08 NOTE — PROGRESS NOTES
Progress Note - Vascular Surgery   Kira Carrington 60 y o  male MRN: [id number]  Unit/Bed#: [facility] 724-01 Encounter: [id number]    Assessment:  58M with PAD and non salvaable L foot wound s/p L BKA on 8/3 - POD #5    SUREKHA w/ 11cc SS drainage    Plan:  - Stump dressing changes daily  - D/c SUREKHA drain  - Pain control PRN  - PT/OT/dispo  - Continue care per primary    Subjective/Objective   Chief Complaint:     Subjective: Patient had to be straight catheterized overnight x1  Trial to void again at 9am  Pain well controlled  No new complaints  Denies fevers, chills, nausea, vomiting, SOB, chest pain  Objective:     Blood pressure 141/74, pulse 76, temperature 98 3 °F (36 8 °C), temperature source Oral, resp  rate 18, height 6' 1" (1 854 m), weight 118 kg (259 lb 14 8 oz), SpO2 98 %  ,Body mass index is 34 29 kg/m²        Intake/Output Summary (Last 24 hours) at 08/08/18 0524  Last data filed at 08/08/18 0101   Gross per 24 hour   Intake             1660 ml   Output             2786 ml   Net            -1126 ml       Invasive Devices     Drain            Closed/Suction Drain Left Leg Bulb 19 Fr  4 days                Physical Exam: General: AAOx3  Respiratory: BS b/l  Abdomen: Soft, NT, no rebound/guarding, bowel sounds present  Heart: RRR, S1s2  Ext: Left BKA stump - c/d/i  SUREKHA w/ scant SS drainage  Femoral Pulses palpable bilaterally    Lab, Imaging and other studies:  Lab Results   Component Value Date    GLUCOSE 138 08/07/2018    CALCIUM 7 9 (L) 08/07/2018     08/07/2018    K 3 5 08/07/2018    CO2 31 08/07/2018     08/07/2018    BUN 10 08/07/2018    CREATININE 0 60 08/07/2018     Lab Results   Component Value Date    WBC 9 48 08/07/2018    HGB 9 3 (L) 08/07/2018    HCT 28 7 (L) 08/07/2018    MCV 83 08/07/2018     08/07/2018       VTE Pharmacologic Prophylaxis: Enoxaparin (Lovenox)  VTE Mechanical Prophylaxis: sequential compression device

## 2018-08-08 NOTE — OCCUPATIONAL THERAPY NOTE
[address] Evaluation     Patient Name: Kennedy Manzanares  Today's Date: 8/8/2018  Problem List  Patient Active Problem List   Diagnosis    Anxiety and depression    Benign essential HTN    Diabetic neuropathy, type II diabetes mellitus ([facility] )    Hyperlipidemia    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 ([facility])    Vitamin D deficiency    PAD (peripheral artery disease) ([facility])    Elevated troponin    S/P BKA (below knee amputation), left ([facility])    Orthostatic hypotension    Diabetic gastroparesis ([facility])    Class 2 obesity due to excess calories with serious comorbidity and body mass index (BMI) of 35 0 to 35 9 in adult    Triple vessel coronary artery disease    Acute on chronic diastolic heart failure (HCC)    Other constipation    S/P CABG x 3    Diabetic autonomic neuropathy associated with type 2 diabetes mellitus ([facility])    Hyponatremia    Encounter for postoperative care    Diabetic ulcer of left heel associated with type 2 diabetes mellitus, limited to breakdown of skin ([facility] )    Healthcare-associated pneumonia    Chronic CHF ([facility] )    Febrile illness, acute    Post-procedural fever    Preop cardiovascular exam    Obstructive sleep apnea    Urinary retention     Past Medical History  Past Medical History:   Diagnosis Date    Anemia     Anxiety and depression     Autonomic neuropathy     Cataract     Coronary artery disease     Diabetes mellitus ([address] )     Diabetic autonomic neuropathy associated with type 2 diabetes mellitus ([facility] )     Last Assessed: 12/28/2017    Gastroparesis due to DM ([facility] )     History of DVT (deep vein thrombosis)     left LE    HTN (hypertension)     Hyperlipidemia     Non healing left heel wound     Obesity     Orthostatic hypotension      Past Surgical History  Past Surgical History:   Procedure Laterality Date    LEG AMPUTATION THROUGH LOWER TIBIA AND FIBULA Left 8/3/2018    Procedure: AMPUTATION BELOW KNEE (BKA) L BKA;  Surgeon: Jordin Gutierrez MD;  [facility];  Service: Vascular    OK CABG, ARTERY-VEIN, FOUR N/A 3/28/2018    Procedure: CORONARY ARTERY BYPASS GRAFT (CABG) x3 VESSELS, LIMA TO LAD, SVG--> PDA, SVG--> OM2,  RIGHT LEG EVH/SVH TO , INTRA-OP AMANDEEP;  Surgeon: Roya Sams MD;  Location: [facility];  Service: Cardiac Surgery    ROTATOR CUFF REPAIR Bilateral          08/08/18 1102   Note Type   Note type Eval/Treat   Restrictions/Precautions   Weight Bearing Precautions Per Order Yes   RLE Weight Bearing Per Order WBAT  (in surgical shoe)   LLE Weight Bearing Per Order NWB  (BKA)   Braces or Orthoses (surgical shoe RLE)   Other Precautions Fall Risk  (ORTHOSTATIC)   Pain Assessment   Pain Assessment No/denies pain   Pain Score No Pain   Home Living   Type of Home House   Home Layout Two level; Able to live on main level with bedroom/bathroom;1/2 bath on main level  (4 VITOR)   Bathroom Shower/Tub (pt  sponge bathes on FF)   Bathroom Toilet Standard   Bathroom Equipment Commode   Bathroom Accessibility Accessible via walker  (unable to fit w/c in bathroom)   [address]; Wheelchair-manual   Prior Function   Level of [place] Needs assistance with ADLs and functional mobility; Needs assistance with IADLs  (see assessment)   Lives With Spouse   Receives Help From Family   ADL Assistance Needs assistance  ( see assessment)   IADLs Needs assistance   Vocational (not working)   Lifestyle   Autonomy PTA, pt  Mod I in mobility, transfers, toileting, UB ADLs and light beverage/meal prep  However, pt  received A for LB bathing and dressing PRN and wife A with most IADLs excluding above    Pt  does report increased difficulty mobilizing PTA and used w/c primarily in the home for mobility   Reciprocal Relationships supportive wife however works 2 jobs   Service to [employer] not working   Intrinsic Gratification reports mainly sedentary, TV   Psychosocial   Psychosocial (WDL) WDL   Subjective Subjective "I know that I get orthostatic  This happened before "   ADL   Grooming Assistance 5  Supervision/Setup   Grooming Deficit Setup  (seated)   UB Dressing Assistance 4  Minimal Assistance   UB Dressing Deficit Setup; Fasteners   LB Dressing Assistance 1  Total Assistance   LB Dressing Deficit Don/doff R sock; Don/doff R shoe   Toileting Assistance  Unable to assess  (became orthostatic upon standing)   Bed Mobility   Additional Comments Pt  OOB in chair at start of session   Transfers   Sit to Stand 2 Maximal assistance   Additional items Assist x 2; Increased time required;Verbal cues   Stand to Sit 3  Moderate assistance   Additional items Assist x 2; Increased time required;Verbal cues   Additional Comments used RW   Functional Mobility   Functional Mobility (unable to assess as pt  became orthostatic)   Balance   Static Sitting Good   Dynamic Sitting Fair -   Static Standing Poor   Activity Tolerance   Activity Tolerance Patient limited by fatigue;Treatment limited secondary to medical complications (Comment)  (orthostasis)   Nurse Made Aware yes, [facility] cleared for session and aware of BP   RUE Assessment   RUE Assessment WFL   LUE Assessment   LUE Assessment WFL   Hand Function   Gross Motor Coordination Functional   Fine Motor Coordination Functional   Sensation   Light Touch Severe deficits in the RUE;Severe deficits in the LUE  (reports minimal sensation in B/L hands, unable to localize)   Cognition   Overall Cognitive Status [facility]   Arousal/Participation Alert; Cooperative   Attention Attends with cues to redirect   Orientation Level Oriented X4   Memory Within functional limits   Following Commands Follows multistep commands with increased time or repetition   Assessment   Limitation Decreased ADL status; Decreased endurance;Decreased high-level ADLs; Decreased self-care trans   Prognosis Good   Assessment Pt  is a 60 y o  male who was admitted to [facility] on 7/26/2018 w/ diabetic ulcer on L heel  Pt   Now s/p L BKA on 8/3  Pt  w/a significant PMH/co-morbidities of anxiety and depression, HTN, diabetic neuropathy, HLD, DM, orthostatic hypotension, obesity, CAD, CHF, s/p CABG  Pt  currently lives in a 2 SH with FF s/u with wife  PTA, pt  Mod I in mobility, transfers, toileting, UB ADLs and light beverage/meal prep  However, pt  received A for LB bathing and dressing PRN and wife A with most IADLs excluding above  Currently, pt  requires max-mod Ax2 for functional  transfers, min A-s/u for UB ADLs and total A for LB ADLs  A is needed d/t the following impairments: decreased functional activity tolerance, decreased B/L hand senation, generalized weakness, deconditioning, decreased balance, orthopedic restrictions, + orthostasis, slight impulsivity, decreased attention/concentration to task  Additionally, pt  requires min v c  during functional activity for safety  Therefore, pt  will benefit from skilled OT services to maximize functional gains, monitor status and prevent decline  Will continue to follow pt  3-5x/week to meet the goals described below in 10-14 days  Recommend STR once medically stable  Of note, pt  BP was as follows: standing- 73/49, once seated LE elevated and head reclined:132/65  Goals   Patient Goals to get used to standing on 1 leg   LTG Time Frame 10-14   Plan   Treatment Interventions ADL retraining;Functional transfer training; Endurance training;Patient/family training;Equipment evaluation/education; Compensatory technique education;Continued evaluation; Energy conservation   Goal Expiration Date 08/22/18   OT Frequency 3-5x/wk   Recommendation   OT Discharge Recommendation Short Term Rehab   OT - OK to Discharge Yes   Barthel Index   Feeding 10   Bathing 0   Grooming Score 5   Dressing Score 5   Bladder Score 10   Bowels Score 10   Toilet Use Score 5   Transfers (Bed/Chair) Score 5   Mobility (Level Surface) Score 0   Stairs Score 0   Barthel Index Score 50   Modified [place] Scale   Modified [place] Scale 4     GOALS    Patient will perform all functional transfers at a (S) level with use of the least restrictive device  Pt  will perform bed mobility at a mod I level with G balance and activity tolerance  Pt  will complete UB dressing at a mod I level with AE as needed  Pt  will complete LB dressing at a min A level with AE as needed  Pt  will complete all grooming activities at a mod I level  Pt  will complete all toileting tasks at a (S) level  Pt will complete LB bathing at a min A level with the use of AE as needed  Pt will complete UB bathing at a mod I level with the use of AE as needed       Jarod Kuo, MOT, OTR/L

## 2018-08-08 NOTE — PHYSICAL THERAPY NOTE
Physical Therapy Evaluation     Patient's Name: Stuart Andrade    Admitting Diagnosis  Osteomyelitis ([facility] ) [M86 9]  Wound infection [T14  8XXA, L08 9]    Problem List  Patient Active Problem List   Diagnosis    Anxiety and depression    Benign essential HTN    Diabetic neuropathy, type II diabetes mellitus ([facility] )    Hyperlipidemia    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 ([facility])    Vitamin D deficiency    PAD (peripheral artery disease) ([facility])    Elevated troponin    S/P BKA (below knee amputation), left ([facility])    Orthostatic hypotension    Diabetic gastroparesis ([facility])    Class 2 obesity due to excess calories with serious comorbidity and body mass index (BMI) of 35 0 to 35 9 in adult    Triple vessel coronary artery disease    Acute on chronic diastolic heart failure ([facility])    Other constipation    S/P CABG x 3    Diabetic autonomic neuropathy associated with type 2 diabetes mellitus ([facility])    Hyponatremia    Encounter for postoperative care    Diabetic ulcer of left heel associated with type 2 diabetes mellitus, limited to breakdown of skin ([facility] )    Healthcare-associated pneumonia    Chronic CHF ([place] [address] )    Febrile illness, acute    Post-procedural fever    Preop cardiovascular exam    Obstructive sleep apnea    Urinary retention       Past Medical History  Past Medical History:   Diagnosis Date    Anemia     Anxiety and depression     Autonomic neuropathy     Cataract     Coronary artery disease     Diabetes mellitus ([facility] )     Diabetic autonomic neuropathy associated with type 2 diabetes mellitus ([place] [address] )     Last Assessed: 12/28/2017    Gastroparesis due to DM ([facility] )     History of DVT (deep vein thrombosis)     left LE    HTN (hypertension)     Hyperlipidemia     Non healing left heel wound     Obesity     Orthostatic hypotension        Past Surgical History  Past Surgical History:   Procedure Laterality Date    LEG AMPUTATION THROUGH LOWER TIBIA AND FIBULA Left 8/3/2018    Procedure: AMPUTATION BELOW KNEE (BKA) L BKA;  Surgeon: Chin Senior MD;  [facility];  Service: Vascular    RI CABG, ARTERY-VEIN, FOUR N/A 3/28/2018    Procedure: CORONARY ARTERY BYPASS GRAFT (CABG) x3 VESSELS, LIMA TO LAD, SVG--> PDA, SVG--> OM2,  RIGHT LEG EVH/SVH TO , INTRA-OP AMANDEEP;  Surgeon: Trevon Luis MD;  [facility];  Service: Cardiac Surgery    ROTATOR CUFF REPAIR Bilateral       08/08/18 1105   Note Type   Note type Eval only   Pain Assessment   Pain Assessment No/denies pain   Pain Score No Pain   Pain Type Surgical pain   Pain Location Leg   Pain Orientation Left   Home Living   Type of [address] to live on main level with bedroom/bathroom; Two level  (4 VITOR)   Home Equipment Walker; Wheelchair-manual   Prior Function   Level of [place] Needs assistance with ADLs and functional mobility   Lives With Spouse   Receives Help From Family   ADL Assistance Independent  (independent w/ mobility and transfers)   IADLs Needs assistance   Comments Patient lives at home with his wife in a 2 level home w/ 4 VITOR  Patient has a 1st floor set up and utilizes a w/c and walker for mobility  PTA, patient was independent in mobility and transfers however, requires assistance with bathing, dressing and IADL's  Pt expressed difficulty w/ mobility  Restrictions/Precautions   Weight Bearing Precautions Per Order Yes   RLE Weight Bearing Per Order WBAT  (surgical shoe)   Braces or Orthoses Other (Comment)  (Surgical Shoe RLE)   Other Precautions Fall Risk;Pain   General   Additional Pertinent History 8/3/18 L BKA   Family/Caregiver Present No   Cognition   Overall Cognitive Status WFL   Arousal/Participation Cooperative   Orientation Level Oriented X4   Memory Within functional limits   Following Commands Follows one step commands without difficulty   Comments Patient was cooperative and agreeable to therapy session     RLE Assessment RLE Assessment (strength grossly 4-/5)   LLE Assessment   LLE Assessment (L BKA; strength 3/5; limited knee extension)   Coordination   Movements are Fluid and Coordinated 0   Coordination and Movement Description orthrostatic hypotension w/ standing; decreased balance and strength   Sensation X   Light Touch   RLE Light Touch Impaired   RLE Light Touch Comments impaired below the knee   LLE Light Touch Grossly intact  (above dressing)   Transfers   Sit to Stand 2  Maximal assistance   Additional items Assist x 2;Armrests; Increased time required;Verbal cues   Stand to Sit 3  Moderate assistance   Additional items Assist x 2;Armrests; Increased time required;Verbal cues   Additional Comments RW in front  Patient performed sit to stand w/ RW and max A x2 and demonstrated ability to stand for 2-3 min w/ mild-moderate lateral body sway  Patient became pale and requested to sit down  In sitting BP was recorded at 73/49  After 1-2 min, BP was taken again at 132/65  Due to hypotension response in standing, furrther mobility was not assessed at this time  Will assess when pt is able to tolerated static standing  Balance   Static Sitting Fair +   Dynamic Sitting Fair   Static Standing Poor   Endurance Deficit   Endurance Deficit Yes   Endurance Deficit Description unable to tolerate standing position due to orthostatic hypotension   Activity Tolerance   Activity Tolerance Patient limited by fatigue;Treatment limited secondary to medical complications (Comment)  (orthostasis)   Medical Staff Made Aware Irasema ROJAS   Nurse Made Aware Rosita Garay   Assessment   Prognosis Good   Problem List Decreased strength;Decreased range of motion;Decreased endurance; Impaired balance;Decreased mobility; Decreased coordination;Pain   Assessment Patient seen today for a high complexity physical therapy evaluation   Patient is a 60year old male with pmh including CAD, CABG x3, diastolic PAD and diabetes presenting [facility] w/ a non-healing wound of the left heel  On 8/3/18 patient underwent a L BKA and is WBAT on the RLE w/ a [employer]  Patient lives at home with his wife in a 2 level home w/ 4 VITOR  Patient has a first floor set up and typically uses a walker or w/c at home  Patient in independent in mobility and transfers however, requires assistance w/ bathing, dressing and IADL's  Physical therapy was consulted to assess mobility and discharge recommendation  Upon arrival, patient denied pain and was agreeable to therapy session  Patient required max A x1 for sit to stand transfer w/ RW in front  While standing for 2-3 min, patient requested to sit due to blood pressure  BP was taken at 73/49  After 1-2 min, BP was taken at 132/65  Patient presents w/ decreased static/dynamic balance, impaired strength/ROM, decreased coordination, impaired endurance and activity tolerance and decreased functional mobility  Due to unstable medical status and decline in functional mobility, patient will benefit from skilled physical therapy to address listed impairments  Recommend discharge to rehab when medically stable  Goals   Patient Goals to be able to stand on one leg   [facility] Expiration Date 08/22/18   Short Term Goal #1 1-2 wks: (1) perform bed mobility  Including R/L roll and supine to sit transfer w/ independence (2) perform sit to stand and surface to surface transfers w/ min A x1 and RW in front (3) improve BLE strength by 1/2-1 grade (4) improve static/dynamic balance to good (5) improve activity tolerance to 20-25 min (6) participate in all therapeutic exercise addressing BLE strength/ROM, static/dynamic balance, bed mobility, functional transfers and functional mobility  (7) participate in further gait and mobility assessment with PT when able  Treatment Day 0   Plan   Treatment/Interventions Functional transfer training;LE strengthening/ROM;ADL retraining; Therapeutic exercise; Endurance training;Equipment eval/education; Bed mobility PT Frequency 5x/wk   Recommendation   Recommendation Post acute IP rehab   Equipment Recommended Walker  (RW)   PT - OK to Discharge Yes  (to rehab when stable )   Additional Comments Patient left in chair with call bell in lap at end of session     Modified [place] Scale   Modified [place] Scale 4   Barthel Index   Feeding 10   Bathing 0   Grooming Score 5   Dressing Score 5   Bladder Score 10   Bowels Score 10   Toilet Use Score 5   Transfers (Bed/Chair) Score 5   Mobility (Level Surface) Score 0   Stairs Score 0   Barthel Index Score 50       Josy Rivera, SPT

## 2018-08-09 VITALS
BODY MASS INDEX: 33.28 KG/M2 | HEART RATE: 78 BPM | RESPIRATION RATE: 18 BRPM | TEMPERATURE: 97.5 F | HEIGHT: 73 IN | DIASTOLIC BLOOD PRESSURE: 71 MMHG | SYSTOLIC BLOOD PRESSURE: 129 MMHG | WEIGHT: 251.1 LBS | OXYGEN SATURATION: 100 %

## 2018-08-09 LAB
GLUCOSE SERPL-MCNC: 147 MG/DL (ref 65–140)
GLUCOSE SERPL-MCNC: 159 MG/DL (ref 65–140)
GLUCOSE SERPL-MCNC: 160 MG/DL (ref 65–140)

## 2018-08-09 PROCEDURE — 97535 SELF CARE MNGMENT TRAINING: CPT

## 2018-08-09 PROCEDURE — 97530 THERAPEUTIC ACTIVITIES: CPT

## 2018-08-09 PROCEDURE — 99239 HOSP IP/OBS DSCHRG MGMT >30: CPT | Performed by: HOSPITALIST

## 2018-08-09 PROCEDURE — 82948 REAGENT STRIP/BLOOD GLUCOSE: CPT

## 2018-08-09 RX ORDER — INSULIN GLARGINE 100 [IU]/ML
15 INJECTION, SOLUTION SUBCUTANEOUS 2 TIMES DAILY
Qty: 10 ML | Refills: 0
Start: 2018-08-09 | End: 2018-12-12 | Stop reason: SDUPTHER

## 2018-08-09 RX ORDER — MIDODRINE HYDROCHLORIDE 10 MG/1
5 TABLET ORAL
Start: 2018-08-09 | End: 2018-12-12

## 2018-08-09 RX ORDER — ACETAMINOPHEN 325 MG/1
650 TABLET ORAL EVERY 6 HOURS PRN
Qty: 30 TABLET | Refills: 0 | Status: SHIPPED | OUTPATIENT
Start: 2018-08-09 | End: 2018-12-12

## 2018-08-09 RX ORDER — PANTOPRAZOLE SODIUM 40 MG/1
40 TABLET, DELAYED RELEASE ORAL
Refills: 0
Start: 2018-08-10 | End: 2018-12-12 | Stop reason: SDUPTHER

## 2018-08-09 RX ORDER — POLYETHYLENE GLYCOL 3350 17 G/17G
17 POWDER, FOR SOLUTION ORAL DAILY
Qty: 14 EACH | Refills: 0
Start: 2018-08-10 | End: 2018-12-12

## 2018-08-09 RX ADMIN — METOCLOPRAMIDE HYDROCHLORIDE 5 MG: 10 TABLET ORAL at 09:40

## 2018-08-09 RX ADMIN — INSULIN GLARGINE 15 UNITS: 100 INJECTION, SOLUTION SUBCUTANEOUS at 09:46

## 2018-08-09 RX ADMIN — DOCUSATE SODIUM 100 MG: 100 CAPSULE, LIQUID FILLED ORAL at 17:02

## 2018-08-09 RX ADMIN — MIDODRINE HYDROCHLORIDE 5 MG: 5 TABLET ORAL at 11:59

## 2018-08-09 RX ADMIN — INSULIN LISPRO 2 UNITS: 100 INJECTION, SOLUTION INTRAVENOUS; SUBCUTANEOUS at 07:39

## 2018-08-09 RX ADMIN — Medication 325 MG: at 17:03

## 2018-08-09 RX ADMIN — POLYVINYL ALCOHOL 1 DROP: 14 SOLUTION/ DROPS OPHTHALMIC at 09:41

## 2018-08-09 RX ADMIN — TAMSULOSIN HYDROCHLORIDE 0.4 MG: 0.4 CAPSULE ORAL at 17:02

## 2018-08-09 RX ADMIN — FUROSEMIDE 20 MG: 20 TABLET ORAL at 09:40

## 2018-08-09 RX ADMIN — MIDODRINE HYDROCHLORIDE 5 MG: 5 TABLET ORAL at 07:39

## 2018-08-09 RX ADMIN — Medication 325 MG: at 07:39

## 2018-08-09 RX ADMIN — PANTOPRAZOLE SODIUM 40 MG: 40 TABLET, DELAYED RELEASE ORAL at 06:19

## 2018-08-09 RX ADMIN — ATORVASTATIN CALCIUM 80 MG: 80 TABLET, FILM COATED ORAL at 17:02

## 2018-08-09 RX ADMIN — CLOPIDOGREL BISULFATE 75 MG: 75 TABLET, FILM COATED ORAL at 09:40

## 2018-08-09 RX ADMIN — FLUDROCORTISONE ACETATE 0.1 MG: 0.1 TABLET ORAL at 09:40

## 2018-08-09 RX ADMIN — MIDODRINE HYDROCHLORIDE 5 MG: 5 TABLET ORAL at 17:10

## 2018-08-09 RX ADMIN — ENOXAPARIN SODIUM 40 MG: 100 INJECTION SUBCUTANEOUS at 09:40

## 2018-08-09 NOTE — ASSESSMENT & PLAN NOTE
Lab Results   Component Value Date    HGBA1C 7 9 (H) 07/27/2018       Recent Labs      08/08/18   1610  08/08/18   2126  08/09/18   0627  08/09/18   1113   POCGLU  188*  163*  159*  147*       Blood Sugar Average: Last 72 hrs:  (P) 166 [id number]     Continue lantus 15 U BID

## 2018-08-09 NOTE — ASSESSMENT & PLAN NOTE
· H/o rentention & jacobs in past  · Had retention post op this time as well  · Off jacobs post op & is voiding   Continue flomax

## 2018-08-09 NOTE — OCCUPATIONAL THERAPY NOTE
[address] Progress Note     Patient Name: Connor Vuong  Today's Date: 8/9/2018  Problem List  Patient Active Problem List   Diagnosis    Anxiety and depression    Benign essential HTN    Diabetic neuropathy, type II diabetes mellitus ([facility] )    Hyperlipidemia    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 ([facility])    Vitamin D deficiency    PAD (peripheral artery disease) ([facility])    Elevated troponin    S/P BKA (below knee amputation), left ([facility])    Orthostatic hypotension    Diabetic gastroparesis ([facility])    Class 2 obesity due to excess calories with serious comorbidity and body mass index (BMI) of 35 0 to 35 9 in adult    Triple vessel coronary artery disease    Acute on chronic diastolic heart failure ([facility])    Other constipation    S/P CABG x 3    Diabetic autonomic neuropathy associated with type 2 diabetes mellitus ([facility])    Hyponatremia    Encounter for postoperative care    Diabetic ulcer of left heel associated with type 2 diabetes mellitus, limited to breakdown of skin ([facility] )    Healthcare-associated pneumonia    Chronic CHF ([place] [address] )    Febrile illness, acute    Post-procedural fever    Preop cardiovascular exam    Obstructive sleep apnea    Urinary retention           08/09/18 0834   Restrictions/Precautions   Weight Bearing Precautions Per Order Yes   RLE Weight Bearing Per Order WBAT  (surgical shoe)   LLE Weight Bearing Per Order NWB  (BKA)   Other Precautions Fall Risk;WBS   Pain Assessment   Pain Assessment No/denies pain   ADL   Where Assessed Edge of bed  (also supine in bed as pt  fatigued EOB)   Grooming Assistance 5  Supervision/Setup   Grooming Deficit Setup   UB Bathing Assistance 4  Minimal Assistance   UB Bathing Deficit Setup; Increased time to complete  (back)   LB Bathing Assistance 4  Minimal Assistance   LB Bathing Deficit Setup; Increased time to complete  (R foot, A for throughness with bottom)   UB Dressing Assistance 4 Minimal Assistance   UB Dressing Deficit Setup; Fasteners; Increased time to complete   LB Dressing Assistance 1  Total Assistance   LB Dressing Deficit Setup;Don/doff R sock; Don/doff R shoe   Toileting Assistance  2  Maximal Assistance   Toileting Deficit Setup;Steadying;Supervison/safety; Increased time to complete;Use of bedpan/urinal setup  (standing with urinal)   Functional Standing Tolerance   Time ~2 min   Activity toileting   Comments tolerated well, no symptoms of orthostasis   Bed Mobility   Rolling R 6  Modified independent   Additional items Bedrails   Rolling L 6  Modified independent   Additional items Bedrails   Supine to Sit 4  Minimal assistance   Additional items Assist x 1; Increased time required  (on third trial, (S) on trial 1 and 2)   Sit to Supine 5  Supervision   Additional items HOB elevated; Bedrails; Increased time required   Transfers   Sit to Stand 3  Moderate assistance   Additional items Assist x 1  (SBAx1, bed elevated)   Stand to Sit 3  Moderate assistance   Additional items Assist x 1; Increased time required  (SBA x1)   Sit pivot 5  Supervision   Additional items Assist x 1; Increased time required;Verbal cues   Cognition   Overall Cognitive Status [facility]   Arousal/Participation Alert; Cooperative   Attention Attends with cues to redirect   Orientation Level Oriented X4   Memory Within functional limits   Following Commands Follows multistep commands with increased time or repetition  (pt  very chatty and distractible at times)   Assessment   Assessment Pt  seen for OT treatment session to address UB/LB dressing and bathing, grooming, toileting, bed mobility, transfers, education on pressure relief  Pt  pleasant and agreeable to participate and overall, pt  tolerated session well   Pt  functioning at a SBA level for lateral scoot transfers bed>drop arm recliner however mod Ax1 with SBA x1 was needed for STS transfers 2/2: decreased functional activity tolerance, generalized weakness, deconditioning, decreased balance  Pt  Able to roll R<>L in bed at a mod I level with the use of bed rails and was able to transfer supine to sit with SBA on 2 trials however on 3rd trial min A was needed 2/2 fatigue  Pt  Educated on use of lateral leans for weight shifting/pressure relief and to wash bottom EOB  Min A was needed to wash back, R foot, and for thoroughness when washing bottom  Overall min A was needed for UB/LB bathing 2/2 decreased B/L hand sensation, decreased flexibility, decreased seated balance  Pt  Able to complete grooming without A after s/u  Min A was needed for fasteners when doffing gown however pt  Able to don at a (S) level with gown s/u as pullover shirt  Max A was required for toileting as pt  Required BUE on RW to stand when urinating  Total A was needed to don/doff R sock and shoe as pt  Unable to reach 2/2 decreased flexibility and balance  Pt  will continue to benefit from OT services to address noted deficits and maximize functional gains  Continue to rec  STR s/p d c    Plan   Treatment Interventions ADL retraining;Functional transfer training; Endurance training;Patient/family training;Equipment evaluation/education; Compensatory technique education;Continued evaluation; Energy conservation   Goal Expiration Date 08/22/18   Treatment Day 1   OT Frequency 3-5x/wk   Recommendation   OT Discharge Recommendation Short Term Rehab   OT - OK to Discharge Yes   Barthel Index   Feeding 10   Bathing 0   Grooming Score 5   Dressing Score 5   Bladder Score 10   Bowels Score 10   Toilet Use Score 5   Transfers (Bed/Chair) Score 5   Mobility (Level Surface) Score 0   Stairs Score 0   Barthel Index Score 50   Modified [place] Scale   Modified [place] Scale 4      Florinda Leaks, MOT, OTR/L

## 2018-08-09 NOTE — RESTORATIVE TECHNICIAN NOTE
Restorative Specialist Mobility Note       Activity: Chair, Stand at bedside (Educated/encouraged pt to ambulate with assistance 3-4 x's/day  Chair alarm on   Pt callbell, phone/tray within reach )     Assistive Device: Front wheel walker                    Range of Motion: Right leg, Left leg       Oscar MALAVE, Restorative Technician, [employer]

## 2018-08-09 NOTE — PROGRESS NOTES
Progress Note - Podiatry  Lisa Henderson 60 y o  male MRN: [id number]  Unit/Bed#: [facility] 724-01 Encounter: [id number]    Assessment:  1  Left heel wound with cellulitis and osteomyelitis secondary to diabetic neuropathy and PAD - s/p L BKA  2  Dry blister on plantar midfoot of right  -appears dry and stable, no active drainage noted, no latrell wound erythema noted, no clinical signs of infection  3  Bilateral lateral leg superficial wounds secondary to trauma frrom wheelchair  - healing, appears dry and stable    Plan:  -patient is status post left BKA by vascular  -dry blister on right plantar midfoot appears stable at this time, no other intervention is needed   -patient is stable for discharge from Podiatry standpoint    Subjective/Objective   Chief Complaint:   Chief Complaint   Patient presents with    Wound Infection     pt with non healing wound on his left leg/foot area- has a bone infection in his left heel, pt stated that his MD told him that he needs IV abx an have his foot/ leg amputated        Subjective: 60 y o  y/o male was seen and evaluated at bedside  No acute events overnight  Denies pain to bilateral extremities  Denies F/C/N/V    Blood pressure 129/71, pulse 78, temperature 97 5 °F (36 4 °C), temperature source Oral, resp  rate 18, height 6' 1" (1 854 m), weight 114 kg (251 lb 1 7 oz), SpO2 100 %  ,Body mass index is 33 13 kg/m²  Invasive Devices          No matching active lines, drains, or airways          Physical Exam:   General: Alert, cooperative and no distress  Lungs: Non labored breathing  Heart: Positive S1, S2  Abdomen: Soft, non-tender  Extremity:  Right plantar midfoot dry blister appears stable, healing well, no active drainage, no clinical sign of infection              Lab, Imaging and other studies:   I have personally reviewed pertinent lab results        Imaging: I have personally reviewed pertinent films in PACS  EKG, Pathology, and Other Studies: I have personally reviewed pertinent reports    VTE Pharmacologic Prophylaxis: Heparin

## 2018-08-09 NOTE — SOCIAL WORK
Informed that pt is medically stable for discharge  Informed Al Guerrero at [facility]  She stated that she submitted updated clinical information including therapy notes to pt's insurance  Received phone call from Al Guerrero who stated that she received insurance auth for pt from [facility] at [facility] arranged a [facility] transport with [employer] for a 5 pm   Pt, pt's bedside RN Morgan Ricci at [facility] made aware of same  Pt stated that he will inform his wife and dgt of his transport time  Informed pt of out of pocket cost for the [facility] and pt was agreeable to the cost   Facility Agency transfer form completed and chart copy requested

## 2018-08-09 NOTE — PLAN OF CARE
Problem: OCCUPATIONAL THERAPY ADULT  Goal: Performs self-care activities at highest level of function for planned discharge setting  See evaluation for individualized goals  Treatment Interventions: ADL retraining, Functional transfer training, Endurance training, Patient/family training, Equipment evaluation/education, Compensatory technique education, Continued evaluation, Energy conservation          See flowsheet documentation for full assessment, interventions and recommendations  Outcome: Progressing  Limitation: Decreased ADL status, Decreased endurance, Decreased high-level ADLs, Decreased self-care trans  Prognosis: Good  Assessment: Pt  seen for OT treatment session to address UB/LB dressing and bathing, grooming, toileting, bed mobility, transfers, education on pressure relief  Pt  pleasant and agreeable to participate and overall, pt  tolerated session well  Pt  functioning at a SBA level for lateral scoot transfers bed>drop arm recliner however mod Ax1 with SBA x1 was needed for STS transfers 2/2: decreased functional activity tolerance, generalized weakness, deconditioning, decreased balance  Pt  Able to roll R<>L in bed at a mod I level with the use of bed rails and was able to transfer supine to sit with SBA on 2 trials however on 3rd trial min A was needed 2/2 fatigue  Pt  Educated on use of lateral leans for weight shifting/pressure relief and to wash bottom EOB  Min A was needed to wash back, R foot, and for thoroughness when washing bottom  Overall min A was needed for UB/LB bathing 2/2 decreased B/L hand sensation, decreased flexibility, decreased seated balance  Pt  Able to complete grooming without A after s/u  Min A was needed for fasteners when doffing gown however pt  Able to don at a (S) level with gown s/u as pullover shirt  Max A was required for toileting as pt  Required BUE on RW to stand when urinating  Total A was needed to don/doff R sock and shoe as pt   Unable to reach 2/2 decreased flexibility and balance  Pt  will continue to benefit from OT services to address noted deficits and maximize functional gains  Continue to rec  STR s/p d c      OT Discharge Recommendation: Short Term Rehab  OT - OK to Discharge:  Yes

## 2018-08-09 NOTE — ASSESSMENT & PLAN NOTE
· S/p Left BKA on 8/3/18 by vascular surgery  · Drain removed & cleared by vascular for Discharge  · No significant pain - not needing pain meds  · S/p IV antibiotics pre-op for wound inofection, now off them per ID post BKA & stable  · [facility]

## 2018-08-09 NOTE — DISCHARGE SUMMARY
Discharge- Claude Burnham 1960, 60 y o  male MRN: [id number]    [facility] Encounter: [id number]    Primary Care Provider: Isabel Flores DO   Date and time admitted to hospital: 7/26/2018  5:03 PM    Hospital Course:     Claude Burnham is a 60 y o  male patient who originally presented to the hospital on 7/26/2018 due to nonhealing also on the left foot  Patient has been seen by vascular surgery in the past before this admission, in June underwent recanalization and the vascularization which helped with the circulation and healing, subsequently wound started getting worse, he was seen by Podiatry who prescribed Keflex however it continued to worsen hence he presented to the emergency room  [facility] was consulted and patient subsequently underwent left BKA  Was initially placed on antibiotics by Infectious Disease, discontinued them after BKA  Postop he is doing well, drain removed from his stump, Jacobs catheter removed  Being discharged to rehab      * S/P BKA (below knee amputation), left [facility])   Assessment & Plan    · S/p Left BKA on 8/3/18 by vascular surgery  · Drain removed & cleared by vascular for Discharge  · No significant pain - not needing pain meds  · S/p IV antibiotics pre-op for wound inofection, now off them per ID post BKA & stable  · Discharge to [facility] rehab               Urinary retention   Assessment & Plan    · H/o rentention & jacobs in past  · Had retention post op this time as well  · Off jacobs post op & is voiding   Continue flomax        Diabetic ulcer of left heel associated with type 2 diabetes mellitus, limited to breakdown of skin [facility])   Assessment & Plan    Lab Results   Component Value Date    HGBA1C 7 9 (H) 07/27/2018       Recent Labs      08/08/18   1610  08/08/18   2126  08/09/18   0627  08/09/18   1113   POCGLU  188*  163*  159*  147*       Blood Sugar Average: Last 72 hrs:  (P) 166 [id number]     Continue lantus 15 U BID Triple vessel coronary artery disease   Assessment & Plan    · S/p CABG x 3 on 3/28/18  · Continue Plavix statin        Orthostatic hypotension   Assessment & Plan    · Cont florinef, midodrine  · On tamsulosin        PAD (peripheral artery disease) (formerly [facility])   Assessment & Plan    On statin Plavix        Benign essential HTN   Assessment & Plan    Continue current meds            Transition of Care Discharge Summary - [facility]    Patient Information: Peterson Moore 60 y o  male MRN: [id number]  Unit/Bed#: [facility] 724-01 Encounter: [id number]    Discharging Physician / Practitioner: Lore Walters MD  PCP: Marlena Ordonez DO  Admission Date: 7/26/2018  Discharge Date: 08/09/18    Disposition:      Acute Rehab Facility at [facility]    For Discharges to [facility] SNF:   · Not Applicable to this Patient - Not Applicable to this Patient    Reason for Admission: nonhealing foot ulcer    Discharge Diagnoses:     Principal Problem:    S/P BKA (below knee amputation), left ([address] )  Active Problems:    Benign essential HTN    PAD (peripheral artery disease) (formerly [facility])    Orthostatic hypotension    Triple vessel coronary artery disease    Other constipation    Diabetic ulcer of left heel associated with type 2 diabetes mellitus, limited to breakdown of skin ([address] )    Obstructive sleep apnea    Urinary retention  Resolved Problems:    Bilateral lower extremity edema      Consultations During Hospital Stay:  · Vascular surgery  · Podiatry  · Infectious Disease  · Cardiology    Procedures Performed:     · Left BKA on 08/03/2018 by vascular surgery    Medication Adjustments and Discharge Medications:  · Summary of Medication Adjustments made as a result of this hospitalization:  Lantus 15 U twice a day  · Medication Dosing Tapers - Please refer to Discharge Medication List for details on any medication dosing tapers (if applicable to patient)    · Medications being temporarily held (include recommended restart time):   · Discharge Medication List: See after visit summary for reconciled discharge medications  Wound Care Recommendations:  When applicable, please see wound care section of After Visit Summary  Diet Recommendations at Discharge:  Diet -        Diet Orders            Start     Ordered    08/06/18 1347  Dietary nutrition supplements  Once     Question Answer Comment   Select Supplement: Glucerna-Chocolate    Frequency Dinner        08/06/18 1346    08/03/18 1829  Diet Aren/CHO Controlled; Consistent Carbohydrate Diet Level 2 (5 carb servings/75 grams CHO/meal)  Diet effective now     Question Answer Comment   Diet Type Aren/CHO Controlled    Aren/CHO Controlled Consistent Carbohydrate Diet Level 2 (5 carb servings/75 grams CHO/meal)    RD to adjust diet per protocol? Yes        08/03/18 1829        Fluid Restriction - No Fluid Restriction at Discharge  Instructions for any Catheters / Lines Present at Discharge (including removal date, if applicable):  None    Significant Findings / Test Results:     · None    Incidental Findings:   · None     Test Results Pending at Discharge (will require follow up): · None     Outpatient Tests Requested:  · None    Complications:  None        Condition at Discharge: stable     Discharge Day Visit / Exam:     Subjective:  Feels good, had bowel movement today, no chest pain or shortness of breath no abdominal pain nausea and vomiting    Vitals: Blood Pressure: 129/71 (08/09/18 0743)  Pulse: 78 (08/09/18 0743)  Temperature: 97 5 °F (36 4 °C) (08/09/18 0743)  Temp Source: Oral (08/09/18 0743)  Respirations: 18 (08/09/18 0743)  Height: 6' 1" (185 4 cm) (07/26/18 1523)  Weight - Scale: 114 kg (251 lb 1 7 oz) (08/09/18 0600)  SpO2: 100 % (08/09/18 0743)  Exam:   Physical Exam   Constitutional: He is oriented to person, place, and time  He appears well-developed and well-nourished  HENT:   Head: Normocephalic and atraumatic     Eyes: Conjunctivae are normal  No scleral icterus  Cardiovascular: Normal rate, regular rhythm and normal heart sounds  Exam reveals no gallop and no friction rub  No murmur heard  Pulmonary/Chest: Effort normal  No respiratory distress  He has no wheezes  He has no rales  Abdominal: Soft  He exhibits no distension  There is no tenderness  Musculoskeletal:   Left BKA   Neurological: He is alert and oriented to person, place, and time  Vitals reviewed  Goals of Care Discussions:  · Code Status at Discharge: Level 1 - Full Code  · Were there any Goals of Care Discussions during Hospitalization?: No  · Results of any General Goals of Care Discussions:    · POLST Completed: No   · If POLST Completed, Summary of POLST Agreement Provided Here:    · OK to Rehospitalize if Needed? Yes    Discharge instructions/Information to patient and family:   See after visit summary section titled Discharge Instructions for information provided to patient and family  Planned Readmission: no      Discharge Statement:  I spent 30 minutes discharging the patient  This time was spent on the day of discharge  I had direct contact with the patient on the day of discharge  Greater than 50% of the total time was spent examining patient, answering all patient questions, arranging and discussing plan of care with patient as well as directly providing post-discharge instructions  Additional time then spent on discharge activities      ** Please Note: This note has been constructed using a voice recognition system **

## 2018-08-10 ENCOUNTER — TRANSITIONAL CARE MANAGEMENT (OUTPATIENT)
Dept: FAMILY MEDICINE CLINIC | Facility: CLINIC | Age: 58
End: 2018-08-10

## 2018-08-13 NOTE — TELEPHONE ENCOUNTER
Procedure: AMPUTATION BELOW KNEE (BKA) L BKA (Left)    Date of Procedure:     8/3/18                           Surgeon: Dr Lucina Jama    Discharge Date: 8/9/18      Chest pain?: No    Shortness of Breath?: No    Increased pain, swelling, warmth, or redness?: No    Pus draining from the incision?: No    Foul- smelling drainage?: No    Signs of dehiscence? No    Fever/Chills?: No    Bleeding? No    Blood thinners/Antiplatelet?: Plavix    Pain Controlled?: Yes      NEXT OFFICE VISIT SCHEDULED: 8/31/18      Any further questions/concerns? : None

## 2018-08-14 ENCOUNTER — TRANSITIONAL CARE MANAGEMENT (OUTPATIENT)
Dept: FAMILY MEDICINE CLINIC | Facility: CLINIC | Age: 58
End: 2018-08-14

## 2018-08-31 ENCOUNTER — OFFICE VISIT (OUTPATIENT)
Dept: VASCULAR SURGERY | Facility: CLINIC | Age: 58
End: 2018-08-31

## 2018-08-31 VITALS
HEIGHT: 73 IN | SYSTOLIC BLOOD PRESSURE: 130 MMHG | TEMPERATURE: 97.8 F | RESPIRATION RATE: 16 BRPM | HEART RATE: 72 BPM | DIASTOLIC BLOOD PRESSURE: 68 MMHG

## 2018-08-31 DIAGNOSIS — Z89.512 S/P BKA (BELOW KNEE AMPUTATION), LEFT (HCC): Primary | ICD-10-CM

## 2018-08-31 PROCEDURE — 99024 POSTOP FOLLOW-UP VISIT: CPT | Performed by: SURGERY

## 2018-08-31 RX ORDER — FERROUS SULFATE 325(65) MG
TABLET ORAL DAILY
COMMUNITY
End: 2018-08-31 | Stop reason: SDUPTHER

## 2018-08-31 RX ORDER — FINASTERIDE 5 MG/1
5 TABLET, FILM COATED ORAL DAILY
COMMUNITY
End: 2018-12-12 | Stop reason: SDUPTHER

## 2018-08-31 RX ORDER — SENNA PLUS 8.6 MG/1
TABLET ORAL DAILY
COMMUNITY
End: 2018-12-12

## 2018-08-31 NOTE — LETTER
August 31, 2018     Lalawanda Dumont, DPM  [address] [facility] [address]    Patient: Manny Tran   YOB: 1960   Date of Visit: 8/31/2018       Dear Dr Melissa Jaime: Thank you for referring Manny Tran to me for evaluation  Below are my notes for this consultation  If you have questions, please do not hesitate to call me  I look forward to following your patient along with you  Sincerely,        Jie London MD        CC: DO Jie Hand MD  8/31/2018 11:25 AM  Sign at close encounter  Assessment/Plan:    S/P BKA (below knee amputation), left ([facility] )  Stump has healed well, all stiches and staples removed today  Steristrips applied  Ok to start using stump   Wait another 4 weeks before putting prosthetic on it  Diagnoses and all orders for this visit:    S/P BKA (below knee amputation), left ([facility])    Other orders  -     Discontinue: ferrous sulfate 325 (65 Fe) mg tablet; Daily  -     Discontinue: insulin detemir (LEVEMIR) 100 units/mL subcutaneous injection; Inject 24 units as needed by subcutaneous route  -     enoxaparin (LOVENOX) 40 mg/0 4 mL; 0 4 mL Daily  -     Multiple Vitamins-Minerals (MULTIVITAMIN ADULT EXTRA C PO); Daily  -     senna (SENNA-LAX) 8 6 MG tablet; Daily  -     insulin aspart (NovoLOG) 100 units/mL injection; Inject under the skin 3 (three) times a day before meals  -     finasteride (PROSCAR) 5 mg tablet; Take 5 mg by mouth daily          Subjective:      Patient ID: Manny Tran is a 60 y o  male  Patient is status post BKA by DR Carla Vázquez 8/3/2018  Patient denies fever and chills  He has phantom sensation in his left leg  Incision is clean and dry  No drainage  He denies any pain  Patient has not fallen  Patient is at [facility] for rehab currently  Patient takes Plavix and Lipitor           The following portions of the patient's history were reviewed and updated as appropriate: allergies, current medications, past family history, past medical history, past social history, past surgical history and problem list     Review of Systems   Constitutional: Negative  HENT: Negative  Eyes: Negative  Respiratory: Negative  Cardiovascular: Negative  Gastrointestinal: Negative  Endocrine: Negative  Genitourinary: Negative  Musculoskeletal: Positive for gait problem  Skin: Positive for wound  Allergic/Immunologic: Negative  Neurological: Negative for dizziness, light-headedness and headaches  Hematological: Negative  Psychiatric/Behavioral: Negative  Objective:      /68 (BP Location: Right arm, Patient Position: Sitting)   Pulse 72   Temp 97 8 °F (36 6 °C)   Resp 16   Ht 6' 1" (1 854 m)          Physical Exam    Left BKA incision has healed well

## 2018-08-31 NOTE — ASSESSMENT & PLAN NOTE
Stump has healed well, all stiches and staples removed today  Steristrips applied  Ok to start using stump   Wait another 4 weeks before putting prosthetic on it

## 2018-08-31 NOTE — PROGRESS NOTES
Assessment/Plan:    S/P BKA (below knee amputation), left ([address] )  Stump has healed well, all stiches and staples removed today  Steristrips applied  Ok to start using stump   Wait another 4 weeks before putting prosthetic on it  Diagnoses and all orders for this visit:    S/P BKA (below knee amputation), left ([facility])    Other orders  -     Discontinue: ferrous sulfate 325 (65 Fe) mg tablet; Daily  -     Discontinue: insulin detemir (LEVEMIR) 100 units/mL subcutaneous injection; Inject 24 units as needed by subcutaneous route  -     enoxaparin (LOVENOX) 40 mg/0 4 mL; 0 4 mL Daily  -     Multiple Vitamins-Minerals (MULTIVITAMIN ADULT EXTRA C PO); Daily  -     senna (SENNA-LAX) 8 6 MG tablet; Daily  -     insulin aspart (NovoLOG) 100 units/mL injection; Inject under the skin 3 (three) times a day before meals  -     finasteride (PROSCAR) 5 mg tablet; Take 5 mg by mouth daily          Subjective:      Patient ID: Elias Sandoval is a 60 y o  male  Patient is status post BKA by DR Diaz Donald 8/3/2018  Patient denies fever and chills  He has phantom sensation in his left leg  Incision is clean and dry  No drainage  He denies any pain  Patient has not fallen  Patient is at [facility] for rehab currently  Patient takes Plavix and Lipitor  The following portions of the patient's history were reviewed and updated as appropriate: allergies, current medications, past family history, past medical history, past social history, past surgical history and problem list     Review of Systems   Constitutional: Negative  HENT: Negative  Eyes: Negative  Respiratory: Negative  Cardiovascular: Negative  Gastrointestinal: Negative  Endocrine: Negative  Genitourinary: Negative  Musculoskeletal: Positive for gait problem  Skin: Positive for wound  Allergic/Immunologic: Negative  Neurological: Negative for dizziness, light-headedness and headaches  Hematological: Negative  Psychiatric/Behavioral: Negative  Objective:      /68 (BP Location: Right arm, Patient Position: Sitting)   Pulse 72   Temp 97 8 °F (36 6 °C)   Resp 16   Ht 6' 1" (1 854 m)          Physical Exam    Left BKA incision has healed well

## 2018-09-21 ENCOUNTER — OFFICE VISIT (OUTPATIENT)
Dept: UROLOGY | Facility: MEDICAL CENTER | Age: 58
End: 2018-09-21
Payer: COMMERCIAL

## 2018-09-21 VITALS
WEIGHT: 236 LBS | SYSTOLIC BLOOD PRESSURE: 162 MMHG | HEIGHT: 73 IN | BODY MASS INDEX: 31.28 KG/M2 | DIASTOLIC BLOOD PRESSURE: 98 MMHG

## 2018-09-21 DIAGNOSIS — Z87.898 HISTORY OF URINARY RETENTION: ICD-10-CM

## 2018-09-21 DIAGNOSIS — N13.8 BPH WITH OBSTRUCTION/LOWER URINARY TRACT SYMPTOMS: ICD-10-CM

## 2018-09-21 DIAGNOSIS — Z95.1 S/P CABG (CORONARY ARTERY BYPASS GRAFT): ICD-10-CM

## 2018-09-21 DIAGNOSIS — R33.9 INCOMPLETE BLADDER EMPTYING: Primary | ICD-10-CM

## 2018-09-21 DIAGNOSIS — N40.1 BPH WITH OBSTRUCTION/LOWER URINARY TRACT SYMPTOMS: ICD-10-CM

## 2018-09-21 LAB
SL AMB  POCT GLUCOSE, UA: ABNORMAL
SL AMB LEUKOCYTE ESTERASE,UA: ABNORMAL
SL AMB POCT BILIRUBIN,UA: ABNORMAL
SL AMB POCT BLOOD,UA: ABNORMAL
SL AMB POCT CLARITY,UA: ABNORMAL
SL AMB POCT COLOR,UA: ABNORMAL
SL AMB POCT KETONES,UA: ABNORMAL
SL AMB POCT NITRITE,UA: ABNORMAL
SL AMB POCT PH,UA: 8.5
SL AMB POCT SPECIFIC GRAVITY,UA: 1.01
SL AMB POCT URINE PROTEIN: ABNORMAL
SL AMB POCT UROBILINOGEN: 0.2

## 2018-09-21 PROCEDURE — 81003 URINALYSIS AUTO W/O SCOPE: CPT | Performed by: UROLOGY

## 2018-09-21 PROCEDURE — 99214 OFFICE O/P EST MOD 30 MIN: CPT | Performed by: UROLOGY

## 2018-09-21 RX ORDER — TAMSULOSIN HYDROCHLORIDE 0.4 MG/1
0.4 CAPSULE ORAL 2 TIMES DAILY
Qty: 60 CAPSULE | Refills: 6 | Status: SHIPPED | OUTPATIENT
Start: 2018-09-21 | End: 2018-12-12 | Stop reason: SDUPTHER

## 2018-09-21 RX ORDER — BROMFENAC 1.03 MG/ML
1 SOLUTION/ DROPS OPHTHALMIC DAILY
COMMUNITY
End: 2018-12-12

## 2018-09-21 RX ORDER — ERGOCALCIFEROL (VITAMIN D2) 1250 MCG
50000 CAPSULE ORAL WEEKLY
COMMUNITY
End: 2018-12-12

## 2018-09-21 RX ORDER — ONDANSETRON 4 MG/1
4 TABLET, FILM COATED ORAL EVERY 8 HOURS PRN
COMMUNITY
End: 2018-12-12

## 2018-09-26 ENCOUNTER — HOSPITAL ENCOUNTER (OUTPATIENT)
Dept: RADIOLOGY | Facility: HOSPITAL | Age: 58
Discharge: HOME | End: 2018-09-26
Attending: UROLOGY
Payer: COMMERCIAL

## 2018-09-26 ENCOUNTER — TRANSCRIBE ORDERS (OUTPATIENT)
Dept: RADIOLOGY | Facility: HOSPITAL | Age: 58
End: 2018-09-26

## 2018-09-26 DIAGNOSIS — Z87.898 HISTORY OF URINARY RETENTION: ICD-10-CM

## 2018-09-26 DIAGNOSIS — R33.9 INCOMPLETE BLADDER EMPTYING: ICD-10-CM

## 2018-09-26 PROCEDURE — 51798 US URINE CAPACITY MEASURE: CPT

## 2018-10-19 ENCOUNTER — TRANSITIONAL CARE MANAGEMENT (OUTPATIENT)
Dept: FAMILY MEDICINE CLINIC | Facility: CLINIC | Age: 58
End: 2018-10-19

## 2018-12-12 ENCOUNTER — OFFICE VISIT (OUTPATIENT)
Dept: FAMILY MEDICINE CLINIC | Facility: CLINIC | Age: 58
End: 2018-12-12
Payer: COMMERCIAL

## 2018-12-12 VITALS
SYSTOLIC BLOOD PRESSURE: 124 MMHG | BODY MASS INDEX: 33.93 KG/M2 | OXYGEN SATURATION: 96 % | WEIGHT: 256 LBS | HEIGHT: 73 IN | RESPIRATION RATE: 16 BRPM | HEART RATE: 78 BPM | DIASTOLIC BLOOD PRESSURE: 76 MMHG

## 2018-12-12 DIAGNOSIS — Z95.1 S/P CABG X 3: ICD-10-CM

## 2018-12-12 DIAGNOSIS — E78.2 MIXED HYPERLIPIDEMIA: ICD-10-CM

## 2018-12-12 DIAGNOSIS — E11.40 TYPE 2 DIABETES MELLITUS WITH DIABETIC NEUROPATHY, WITH LONG-TERM CURRENT USE OF INSULIN (HCC): Primary | ICD-10-CM

## 2018-12-12 DIAGNOSIS — I10 BENIGN ESSENTIAL HTN: ICD-10-CM

## 2018-12-12 DIAGNOSIS — Z89.512 S/P BKA (BELOW KNEE AMPUTATION), LEFT (HCC): ICD-10-CM

## 2018-12-12 DIAGNOSIS — Z79.4 TYPE 2 DIABETES MELLITUS WITH DIABETIC NEUROPATHY, WITH LONG-TERM CURRENT USE OF INSULIN (HCC): Primary | ICD-10-CM

## 2018-12-12 DIAGNOSIS — I50.22 CHRONIC SYSTOLIC CONGESTIVE HEART FAILURE (HCC): ICD-10-CM

## 2018-12-12 DIAGNOSIS — Z95.1 S/P CABG (CORONARY ARTERY BYPASS GRAFT): ICD-10-CM

## 2018-12-12 DIAGNOSIS — Z12.11 COLON CANCER SCREENING: ICD-10-CM

## 2018-12-12 DIAGNOSIS — I73.9 PAD (PERIPHERAL ARTERY DISEASE) (HCC): ICD-10-CM

## 2018-12-12 DIAGNOSIS — R77.8 ELEVATED TROPONIN: ICD-10-CM

## 2018-12-12 DIAGNOSIS — G54.7 PHANTOM LIMB SYNDROME WITHOUT PAIN (HCC): ICD-10-CM

## 2018-12-12 DIAGNOSIS — S91.302A NON-HEALING WOUND OF LEFT HEEL: ICD-10-CM

## 2018-12-12 DIAGNOSIS — J18.9 HEALTHCARE-ASSOCIATED PNEUMONIA: ICD-10-CM

## 2018-12-12 DIAGNOSIS — IMO0002 UNCONTROLLED DIABETES MELLITUS TYPE 2 WITH ATHEROSCLEROSIS OF ARTERIES OF EXTREMITIES: ICD-10-CM

## 2018-12-12 PROBLEM — S88.112A: Status: ACTIVE | Noted: 2018-10-04

## 2018-12-12 LAB — SL AMB POCT HEMOGLOBIN AIC: 9.1

## 2018-12-12 PROCEDURE — 3008F BODY MASS INDEX DOCD: CPT | Performed by: FAMILY MEDICINE

## 2018-12-12 PROCEDURE — 3074F SYST BP LT 130 MM HG: CPT | Performed by: FAMILY MEDICINE

## 2018-12-12 PROCEDURE — 3046F HEMOGLOBIN A1C LEVEL >9.0%: CPT | Performed by: FAMILY MEDICINE

## 2018-12-12 PROCEDURE — 3078F DIAST BP <80 MM HG: CPT | Performed by: FAMILY MEDICINE

## 2018-12-12 PROCEDURE — 99214 OFFICE O/P EST MOD 30 MIN: CPT | Performed by: FAMILY MEDICINE

## 2018-12-12 PROCEDURE — 83036 HEMOGLOBIN GLYCOSYLATED A1C: CPT | Performed by: FAMILY MEDICINE

## 2018-12-12 PROCEDURE — 1036F TOBACCO NON-USER: CPT | Performed by: FAMILY MEDICINE

## 2018-12-12 RX ORDER — TAMSULOSIN HYDROCHLORIDE 0.4 MG/1
0.4 CAPSULE ORAL
Qty: 60 CAPSULE | Refills: 5 | Status: SHIPPED | OUTPATIENT
Start: 2018-12-12 | End: 2019-12-23 | Stop reason: SDUPTHER

## 2018-12-12 RX ORDER — CLOPIDOGREL BISULFATE 75 MG/1
75 TABLET ORAL DAILY
Qty: 30 TABLET | Refills: 5 | Status: SHIPPED | OUTPATIENT
Start: 2018-12-12 | End: 2019-06-24 | Stop reason: SDUPTHER

## 2018-12-12 RX ORDER — METOCLOPRAMIDE 5 MG/1
5 TABLET ORAL DAILY
Qty: 30 TABLET | Refills: 5 | Status: SHIPPED | OUTPATIENT
Start: 2018-12-12 | End: 2019-06-24 | Stop reason: SDUPTHER

## 2018-12-12 RX ORDER — ATORVASTATIN CALCIUM 80 MG/1
80 TABLET, FILM COATED ORAL
Qty: 30 TABLET | Refills: 5 | Status: SHIPPED | OUTPATIENT
Start: 2018-12-12 | End: 2019-06-24

## 2018-12-12 RX ORDER — PANTOPRAZOLE SODIUM 40 MG/1
40 TABLET, DELAYED RELEASE ORAL
Refills: 0
Start: 2018-12-12 | End: 2019-06-24 | Stop reason: SDUPTHER

## 2018-12-12 RX ORDER — INSULIN GLARGINE 100 [IU]/ML
40 INJECTION, SOLUTION SUBCUTANEOUS 2 TIMES DAILY
Qty: 1200 UNITS | Refills: 1 | Status: ON HOLD | OUTPATIENT
Start: 2018-12-12 | End: 2020-02-11 | Stop reason: SDUPTHER

## 2018-12-12 RX ORDER — DOCUSATE SODIUM 100 MG/1
100 CAPSULE, LIQUID FILLED ORAL 2 TIMES DAILY
Qty: 60 CAPSULE | Refills: 5 | Status: SHIPPED | OUTPATIENT
Start: 2018-12-12 | End: 2019-06-24 | Stop reason: SDUPTHER

## 2018-12-12 RX ORDER — FINASTERIDE 5 MG/1
5 TABLET, FILM COATED ORAL DAILY
Qty: 30 TABLET | Refills: 5 | Status: SHIPPED | OUTPATIENT
Start: 2018-12-12 | End: 2019-06-24

## 2018-12-12 RX ORDER — FERROUS SULFATE 325(65) MG
325 TABLET ORAL
Qty: 60 TABLET | Refills: 5 | Status: SHIPPED | OUTPATIENT
Start: 2018-12-12 | End: 2019-06-24 | Stop reason: SDUPTHER

## 2018-12-12 NOTE — PROGRESS NOTES
Assessment/Plan:  A1c done at this time, A1c 9 1  Patient status post BKA on the left  Patient completed rehab  Patient will follow up with appropriate specialists regarding eyes and prosthesis  Continue with current regimen of medications for hyperlipidemia  Patient having insulin increased continued at 40 units twice daily  Patient will have A1c prior to next visit will make adjustments accordingly then  Patient had a hypotension but is off midodrine  Will observe blood pressure at this time  Recommend labs prior to next visit   Diagnoses and all orders for this visit:    Type 2 diabetes mellitus with diabetic neuropathy, with long-term current use of insulin (HCC)  -     POCT hemoglobin A1c  -     CBC and differential; Future  -     Comprehensive metabolic panel; Future  -     Hemoglobin A1C; Future  -     Lipid panel; Future  -     TSH, 3rd generation with Free T4 reflex; Future  -     Microalbumin / creatinine urine ratio    Colon cancer screening    S/P CABG (coronary artery bypass graft)  -     atorvastatin (LIPITOR) 80 mg tablet; Take 1 tablet (80 mg total) by mouth daily with dinner  -     clopidogrel (PLAVIX) 75 mg tablet; Take 1 tablet (75 mg total) by mouth daily  -     tamsulosin (FLOMAX) 0 4 mg; Take 1 capsule (0 4 mg total) by mouth daily with dinner  -     docusate sodium (COLACE) 100 mg capsule; Take 1 capsule (100 mg total) by mouth 2 (two) times a day    Chronic systolic congestive heart failure (HCC)  -     ferrous sulfate 325 (65 Fe) mg tablet; Take 1 tablet (325 mg total) by mouth daily with breakfast  -     finasteride (PROSCAR) 5 mg tablet; Take 1 tablet (5 mg total) by mouth daily  -     metoclopramide (REGLAN) 5 mg tablet; Take 1 tablet (5 mg total) by mouth daily    Healthcare-associated pneumonia  -     insulin glargine (LANTUS) 100 units/mL subcutaneous injection;  Inject 40 Units under the skin 2 (two) times a day    Non-healing wound of left heel  -     insulin glargine (LANTUS) 100 units/mL subcutaneous injection; Inject 40 Units under the skin 2 (two) times a day    Elevated troponin  -     insulin glargine (LANTUS) 100 units/mL subcutaneous injection; Inject 40 Units under the skin 2 (two) times a day    PAD (peripheral artery disease) (HCC)  -     insulin glargine (LANTUS) 100 units/mL subcutaneous injection; Inject 40 Units under the skin 2 (two) times a day    S/P CABG x 3  -     insulin glargine (LANTUS) 100 units/mL subcutaneous injection; Inject 40 Units under the skin 2 (two) times a day    S/P BKA (below knee amputation), left (HCC)  -     pantoprazole (PROTONIX) 40 mg tablet; Take 1 tablet (40 mg total) by mouth daily in the early morning for 14 days    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 (HCC)    Benign essential HTN  -     CBC and differential; Future  -     Comprehensive metabolic panel; Future  -     Hemoglobin A1C; Future  -     Lipid panel; Future  -     TSH, 3rd generation with Free T4 reflex; Future  -     Microalbumin / creatinine urine ratio    Mixed hyperlipidemia    Phantom limb syndrome without pain ([facility] [address] )    Other orders  -     Cancel: Ambulatory referral to Gastroenterology; Future  -     Cancel: Occult Blood, Fecal Immunochemical; Future          Subjective:      Patient ID: Renetta Pastrana is a 60 y o  male  Patient here to follow-up on diabetes, hyperlipidemia, peripheral vascular disease, MARILU D  Patient status post below-knee amputation on the left  Patient did have osteomyelitis  Patient has done rehab and has prosthesis  Patient doing well with walker  Patient's blood sugar this morning was 130  Patient still with diabetic retinopathy  Patient is getting shots for this  Patient status post having cataract surgery done bilaterally  This is improved things  Urination stable  Patient does use a stool softener intermittently for constipation  No falls noted  Patient also had flu shot this year already  No significant chest pain or shortness of breath  Patient will need medications refilled  All review systems negative      Medication Refill   Associated symptoms include arthralgias  The following portions of the patient's history were reviewed and updated as appropriate: allergies, current medications, past family history, past medical history, past social history, past surgical history and problem list     Review of Systems   Constitutional: Negative  HENT: Negative  Eyes: Positive for visual disturbance  Respiratory: Negative  Cardiovascular: Negative  Gastrointestinal: Negative  Endocrine: Negative  Genitourinary: Negative  Musculoskeletal: Positive for arthralgias and gait problem  Skin: Negative  Allergic/Immunologic: Negative  Hematological: Negative  Psychiatric/Behavioral: Negative  Objective:      /76 (BP Location: Left arm, Patient Position: Sitting, Cuff Size: Adult)   Pulse 78   Resp 16   Ht 6' 1" (1 854 m)   Wt 116 kg (256 lb)   SpO2 96%   BMI 33 78 kg/m²          Physical Exam   Constitutional: He is oriented to person, place, and time  He appears well-developed and well-nourished  No distress  HENT:   Head: Normocephalic  Right Ear: External ear normal    Left Ear: External ear normal    Mouth/Throat: Oropharynx is clear and moist  No oropharyngeal exudate  Eyes: Pupils are equal, round, and reactive to light  EOM are normal  Right eye exhibits no discharge  Left eye exhibits no discharge  No scleral icterus  Neck: Normal range of motion  Neck supple  No thyromegaly present  Cardiovascular: Normal rate, regular rhythm, normal heart sounds and intact distal pulses  Exam reveals no gallop and no friction rub  No murmur heard  Pulmonary/Chest: Effort normal and breath sounds normal  No respiratory distress  He has no wheezes  He has no rales  He exhibits no tenderness  Abdominal: Soft   Bowel sounds are normal  He exhibits no distension  There is no tenderness  There is no rebound and no guarding  Musculoskeletal: Normal range of motion  He exhibits deformity  He exhibits no edema or tenderness  Left BKA   Lymphadenopathy:     He has no cervical adenopathy  Neurological: He is oriented to person, place, and time  No cranial nerve deficit  He exhibits normal muscle tone  Coordination normal    Skin: Skin is warm and dry  No rash noted  He is not diaphoretic  No erythema  No pallor  Psychiatric: He has a normal mood and affect  His behavior is normal  Judgment and thought content normal    Nursing note and vitals reviewed

## 2019-04-22 DIAGNOSIS — IMO0002 UNCONTROLLED DIABETES MELLITUS TYPE 2 WITH ATHEROSCLEROSIS OF ARTERIES OF EXTREMITIES: Primary | ICD-10-CM

## 2019-04-22 RX ORDER — INSULIN GLARGINE 100 [IU]/ML
INJECTION, SOLUTION SUBCUTANEOUS
Qty: 135 ML | Refills: 2 | Status: SHIPPED | OUTPATIENT
Start: 2019-04-22 | End: 2020-02-04

## 2019-06-05 ENCOUNTER — TELEPHONE (OUTPATIENT)
Dept: ADMINISTRATIVE | Facility: HOSPITAL | Age: 59
End: 2019-06-05

## 2019-06-05 DIAGNOSIS — L97.421 DIABETIC ULCER OF LEFT MIDFOOT ASSOCIATED WITH TYPE 2 DIABETES MELLITUS, LIMITED TO BREAKDOWN OF SKIN (HCC): Primary | ICD-10-CM

## 2019-06-05 DIAGNOSIS — E11.621 DIABETIC ULCER OF LEFT MIDFOOT ASSOCIATED WITH TYPE 2 DIABETES MELLITUS, LIMITED TO BREAKDOWN OF SKIN (HCC): Primary | ICD-10-CM

## 2019-06-24 ENCOUNTER — OFFICE VISIT (OUTPATIENT)
Dept: FAMILY MEDICINE CLINIC | Facility: CLINIC | Age: 59
End: 2019-06-24
Payer: COMMERCIAL

## 2019-06-24 VITALS
BODY MASS INDEX: 38.22 KG/M2 | SYSTOLIC BLOOD PRESSURE: 130 MMHG | DIASTOLIC BLOOD PRESSURE: 88 MMHG | WEIGHT: 273 LBS | TEMPERATURE: 97.4 F | HEIGHT: 71 IN

## 2019-06-24 DIAGNOSIS — Z95.1 S/P CABG (CORONARY ARTERY BYPASS GRAFT): ICD-10-CM

## 2019-06-24 DIAGNOSIS — R33.9 URINARY RETENTION: ICD-10-CM

## 2019-06-24 DIAGNOSIS — I50.22 CHRONIC SYSTOLIC CONGESTIVE HEART FAILURE (HCC): ICD-10-CM

## 2019-06-24 DIAGNOSIS — I10 BENIGN ESSENTIAL HTN: ICD-10-CM

## 2019-06-24 DIAGNOSIS — Z89.512 S/P BKA (BELOW KNEE AMPUTATION), LEFT (HCC): ICD-10-CM

## 2019-06-24 DIAGNOSIS — G54.7 PHANTOM LIMB SYNDROME WITHOUT PAIN (HCC): ICD-10-CM

## 2019-06-24 DIAGNOSIS — S88.112A AMPUTATED BELOW KNEE, LEFT (HCC): ICD-10-CM

## 2019-06-24 DIAGNOSIS — E78.2 MIXED HYPERLIPIDEMIA: ICD-10-CM

## 2019-06-24 DIAGNOSIS — E11.40 TYPE 2 DIABETES MELLITUS WITH DIABETIC NEUROPATHY, WITH LONG-TERM CURRENT USE OF INSULIN (HCC): Primary | ICD-10-CM

## 2019-06-24 DIAGNOSIS — I73.9 PAD (PERIPHERAL ARTERY DISEASE) (HCC): ICD-10-CM

## 2019-06-24 DIAGNOSIS — Z79.4 TYPE 2 DIABETES MELLITUS WITH DIABETIC NEUROPATHY, WITH LONG-TERM CURRENT USE OF INSULIN (HCC): Primary | ICD-10-CM

## 2019-06-24 DIAGNOSIS — L73.9 FOLLICULITIS: ICD-10-CM

## 2019-06-24 LAB — SL AMB POCT HEMOGLOBIN AIC: 9.5 (ref ?–6.5)

## 2019-06-24 PROCEDURE — 3075F SYST BP GE 130 - 139MM HG: CPT | Performed by: FAMILY MEDICINE

## 2019-06-24 PROCEDURE — 3079F DIAST BP 80-89 MM HG: CPT | Performed by: FAMILY MEDICINE

## 2019-06-24 PROCEDURE — 83036 HEMOGLOBIN GLYCOSYLATED A1C: CPT | Performed by: FAMILY MEDICINE

## 2019-06-24 PROCEDURE — 99214 OFFICE O/P EST MOD 30 MIN: CPT | Performed by: FAMILY MEDICINE

## 2019-06-24 PROCEDURE — 1036F TOBACCO NON-USER: CPT | Performed by: FAMILY MEDICINE

## 2019-06-24 PROCEDURE — 3008F BODY MASS INDEX DOCD: CPT | Performed by: FAMILY MEDICINE

## 2019-06-24 PROCEDURE — 3046F HEMOGLOBIN A1C LEVEL >9.0%: CPT | Performed by: FAMILY MEDICINE

## 2019-06-24 RX ORDER — FINASTERIDE 5 MG/1
TABLET, FILM COATED ORAL
COMMUNITY
End: 2019-06-24 | Stop reason: SDUPTHER

## 2019-06-24 RX ORDER — PANTOPRAZOLE SODIUM 40 MG/1
40 TABLET, DELAYED RELEASE ORAL
Qty: 30 TABLET | Refills: 5 | Status: SHIPPED | OUTPATIENT
Start: 2019-06-24 | End: 2019-12-23 | Stop reason: SDUPTHER

## 2019-06-24 RX ORDER — FERROUS SULFATE 325(65) MG
325 TABLET ORAL
Qty: 30 TABLET | Refills: 5 | Status: SHIPPED | OUTPATIENT
Start: 2019-06-24 | End: 2019-12-23 | Stop reason: SDUPTHER

## 2019-06-24 RX ORDER — CLOPIDOGREL BISULFATE 75 MG/1
75 TABLET ORAL DAILY
Qty: 30 TABLET | Refills: 5 | Status: SHIPPED | OUTPATIENT
Start: 2019-06-24 | End: 2019-12-23 | Stop reason: SDUPTHER

## 2019-06-24 RX ORDER — DOCUSATE SODIUM 100 MG/1
100 CAPSULE, LIQUID FILLED ORAL 2 TIMES DAILY
Qty: 60 CAPSULE | Refills: 5 | Status: SHIPPED | OUTPATIENT
Start: 2019-06-24 | End: 2020-02-04

## 2019-06-24 RX ORDER — FINASTERIDE 5 MG/1
5 TABLET, FILM COATED ORAL DAILY
Qty: 30 TABLET | Refills: 5 | Status: SHIPPED | OUTPATIENT
Start: 2019-06-24 | End: 2019-12-23 | Stop reason: SDUPTHER

## 2019-06-24 RX ORDER — METOCLOPRAMIDE 5 MG/1
5 TABLET ORAL DAILY
Qty: 30 TABLET | Refills: 5 | Status: SHIPPED | OUTPATIENT
Start: 2019-06-24 | End: 2019-12-23 | Stop reason: SDUPTHER

## 2019-06-24 RX ORDER — MIDODRINE HYDROCHLORIDE 5 MG/1
5 TABLET ORAL AS NEEDED
COMMUNITY
Start: 2017-03-02 | End: 2020-02-04

## 2019-06-24 RX ORDER — CEPHALEXIN 500 MG/1
500 CAPSULE ORAL EVERY 12 HOURS SCHEDULED
Qty: 20 CAPSULE | Refills: 0 | Status: SHIPPED | OUTPATIENT
Start: 2019-06-24 | End: 2019-07-04

## 2019-07-18 DIAGNOSIS — M79.89 LEG SWELLING: Primary | ICD-10-CM

## 2019-07-18 RX ORDER — FUROSEMIDE 40 MG/1
40 TABLET ORAL AS NEEDED
Qty: 30 TABLET | Refills: 0 | Status: SHIPPED | OUTPATIENT
Start: 2019-07-18 | End: 2019-08-23 | Stop reason: SDUPTHER

## 2019-07-18 NOTE — TELEPHONE ENCOUNTER
Spoke with patient about Lasix  He understands  Have placed order for approval  to go to [facility] in [place]  Thank you

## 2019-07-18 NOTE — TELEPHONE ENCOUNTER
Patient called stating his legs are swelling again since his last office visit 06/24  He is requesting a water pill

## 2019-08-11 ENCOUNTER — APPOINTMENT (EMERGENCY)
Dept: NON INVASIVE DIAGNOSTICS | Facility: HOSPITAL | Age: 59
End: 2019-08-11
Payer: COMMERCIAL

## 2019-08-11 ENCOUNTER — APPOINTMENT (EMERGENCY)
Dept: RADIOLOGY | Facility: HOSPITAL | Age: 59
End: 2019-08-11
Payer: COMMERCIAL

## 2019-08-11 ENCOUNTER — HOSPITAL ENCOUNTER (OUTPATIENT)
Facility: HOSPITAL | Age: 59
Setting detail: OBSERVATION
Discharge: HOME | End: 2019-08-12
Attending: EMERGENCY MEDICINE | Admitting: FAMILY MEDICINE
Payer: COMMERCIAL

## 2019-08-11 DIAGNOSIS — M79.89 LEG SWELLING: ICD-10-CM

## 2019-08-11 DIAGNOSIS — R60.0 LOWER EXTREMITY EDEMA: Primary | ICD-10-CM

## 2019-08-11 DIAGNOSIS — M79.604 RIGHT LEG PAIN: ICD-10-CM

## 2019-08-11 PROBLEM — E66.01 MORBID OBESITY (HCC): Status: ACTIVE | Noted: 2019-08-11

## 2019-08-11 PROBLEM — I25.10 CAD (CORONARY ARTERY DISEASE): Status: ACTIVE | Noted: 2019-08-11

## 2019-08-11 PROBLEM — I50.32 CHRONIC DIASTOLIC HEART FAILURE (HCC): Status: ACTIVE | Noted: 2018-03-24

## 2019-08-11 LAB
ALBUMIN SERPL BCP-MCNC: 2.9 G/DL (ref 3.5–5)
ALP SERPL-CCNC: 61 U/L (ref 46–116)
ALT SERPL W P-5'-P-CCNC: 33 U/L (ref 12–78)
ANION GAP SERPL CALCULATED.3IONS-SCNC: 7 MMOL/L (ref 4–13)
APTT PPP: 29 SECONDS (ref 23–37)
AST SERPL W P-5'-P-CCNC: 19 U/L (ref 5–45)
BASOPHILS # BLD AUTO: 0.05 THOUSANDS/ΜL (ref 0–0.1)
BASOPHILS NFR BLD AUTO: 1 % (ref 0–1)
BILIRUB SERPL-MCNC: 0.2 MG/DL (ref 0.2–1)
BUN SERPL-MCNC: 18 MG/DL (ref 5–25)
CALCIUM SERPL-MCNC: 8.8 MG/DL (ref 8.3–10.1)
CHLORIDE SERPL-SCNC: 102 MMOL/L (ref 100–108)
CO2 SERPL-SCNC: 28 MMOL/L (ref 21–32)
CREAT SERPL-MCNC: 0.98 MG/DL (ref 0.6–1.3)
EOSINOPHIL # BLD AUTO: 0.32 THOUSAND/ΜL (ref 0–0.61)
EOSINOPHIL NFR BLD AUTO: 4 % (ref 0–6)
ERYTHROCYTE [DISTWIDTH] IN BLOOD BY AUTOMATED COUNT: 13.2 % (ref 11.6–15.1)
GFR SERPL CREATININE-BSD FRML MDRD: 83 ML/MIN/1.73SQ M
GLUCOSE SERPL-MCNC: 153 MG/DL (ref 65–140)
GLUCOSE SERPL-MCNC: 180 MG/DL (ref 65–140)
GLUCOSE SERPL-MCNC: 203 MG/DL (ref 65–140)
HCT VFR BLD AUTO: 41.3 % (ref 36.5–49.3)
HGB BLD-MCNC: 13.4 G/DL (ref 12–17)
IMM GRANULOCYTES # BLD AUTO: 0.02 THOUSAND/UL (ref 0–0.2)
IMM GRANULOCYTES NFR BLD AUTO: 0 % (ref 0–2)
INR PPP: 0.93 (ref 0.84–1.19)
LYMPHOCYTES # BLD AUTO: 1.69 THOUSANDS/ΜL (ref 0.6–4.47)
LYMPHOCYTES NFR BLD AUTO: 24 % (ref 14–44)
MCH RBC QN AUTO: 28 PG (ref 26.8–34.3)
MCHC RBC AUTO-ENTMCNC: 32.4 G/DL (ref 31.4–37.4)
MCV RBC AUTO: 86 FL (ref 82–98)
MONOCYTES # BLD AUTO: 0.46 THOUSAND/ΜL (ref 0.17–1.22)
MONOCYTES NFR BLD AUTO: 6 % (ref 4–12)
NEUTROPHILS # BLD AUTO: 4.66 THOUSANDS/ΜL (ref 1.85–7.62)
NEUTS SEG NFR BLD AUTO: 65 % (ref 43–75)
NRBC BLD AUTO-RTO: 0 /100 WBCS
NT-PROBNP SERPL-MCNC: 266 PG/ML
PLATELET # BLD AUTO: 237 THOUSANDS/UL (ref 149–390)
PMV BLD AUTO: 9.9 FL (ref 8.9–12.7)
POTASSIUM SERPL-SCNC: 4.2 MMOL/L (ref 3.5–5.3)
PROT SERPL-MCNC: 7.5 G/DL (ref 6.4–8.2)
PROTHROMBIN TIME: 12.6 SECONDS (ref 11.6–14.5)
RBC # BLD AUTO: 4.79 MILLION/UL (ref 3.88–5.62)
SODIUM SERPL-SCNC: 137 MMOL/L (ref 136–145)
TROPONIN I SERPL-MCNC: <0.02 NG/ML
WBC # BLD AUTO: 7.2 THOUSAND/UL (ref 4.31–10.16)

## 2019-08-11 PROCEDURE — 93971 EXTREMITY STUDY: CPT

## 2019-08-11 PROCEDURE — 99285 EMERGENCY DEPT VISIT HI MDM: CPT

## 2019-08-11 PROCEDURE — 36415 COLL VENOUS BLD VENIPUNCTURE: CPT | Performed by: EMERGENCY MEDICINE

## 2019-08-11 PROCEDURE — 93005 ELECTROCARDIOGRAM TRACING: CPT

## 2019-08-11 PROCEDURE — 99284 EMERGENCY DEPT VISIT MOD MDM: CPT | Performed by: EMERGENCY MEDICINE

## 2019-08-11 PROCEDURE — 85610 PROTHROMBIN TIME: CPT | Performed by: EMERGENCY MEDICINE

## 2019-08-11 PROCEDURE — 85730 THROMBOPLASTIN TIME PARTIAL: CPT | Performed by: EMERGENCY MEDICINE

## 2019-08-11 PROCEDURE — 82948 REAGENT STRIP/BLOOD GLUCOSE: CPT

## 2019-08-11 PROCEDURE — 71046 X-RAY EXAM CHEST 2 VIEWS: CPT

## 2019-08-11 PROCEDURE — 85025 COMPLETE CBC W/AUTO DIFF WBC: CPT | Performed by: EMERGENCY MEDICINE

## 2019-08-11 PROCEDURE — 80053 COMPREHEN METABOLIC PANEL: CPT | Performed by: EMERGENCY MEDICINE

## 2019-08-11 PROCEDURE — 99220 PR INITIAL OBSERVATION CARE/DAY 70 MINUTES: CPT | Performed by: PHYSICIAN ASSISTANT

## 2019-08-11 PROCEDURE — 83880 ASSAY OF NATRIURETIC PEPTIDE: CPT | Performed by: EMERGENCY MEDICINE

## 2019-08-11 PROCEDURE — 84484 ASSAY OF TROPONIN QUANT: CPT | Performed by: EMERGENCY MEDICINE

## 2019-08-11 RX ORDER — MIDODRINE HYDROCHLORIDE 5 MG/1
5 TABLET ORAL
Status: DISCONTINUED | OUTPATIENT
Start: 2019-08-12 | End: 2019-08-12 | Stop reason: HOSPADM

## 2019-08-11 RX ORDER — ATORVASTATIN CALCIUM 80 MG/1
80 TABLET, FILM COATED ORAL
Status: DISCONTINUED | OUTPATIENT
Start: 2019-08-12 | End: 2019-08-12 | Stop reason: HOSPADM

## 2019-08-11 RX ORDER — ALPRAZOLAM 0.5 MG/1
0.5 TABLET ORAL 2 TIMES DAILY PRN
Status: DISCONTINUED | OUTPATIENT
Start: 2019-08-11 | End: 2019-08-12 | Stop reason: HOSPADM

## 2019-08-11 RX ORDER — INSULIN GLARGINE 100 [IU]/ML
50 INJECTION, SOLUTION SUBCUTANEOUS 2 TIMES DAILY
Status: DISCONTINUED | OUTPATIENT
Start: 2019-08-12 | End: 2019-08-12 | Stop reason: HOSPADM

## 2019-08-11 RX ORDER — FERROUS SULFATE 325(65) MG
325 TABLET ORAL
Status: DISCONTINUED | OUTPATIENT
Start: 2019-08-12 | End: 2019-08-12 | Stop reason: HOSPADM

## 2019-08-11 RX ORDER — ONDANSETRON 2 MG/ML
4 INJECTION INTRAMUSCULAR; INTRAVENOUS EVERY 6 HOURS PRN
Status: DISCONTINUED | OUTPATIENT
Start: 2019-08-11 | End: 2019-08-12 | Stop reason: HOSPADM

## 2019-08-11 RX ORDER — ACETAMINOPHEN 325 MG/1
650 TABLET ORAL EVERY 6 HOURS PRN
Status: DISCONTINUED | OUTPATIENT
Start: 2019-08-11 | End: 2019-08-12 | Stop reason: HOSPADM

## 2019-08-11 RX ORDER — FUROSEMIDE 10 MG/ML
40 INJECTION INTRAMUSCULAR; INTRAVENOUS ONCE
Status: COMPLETED | OUTPATIENT
Start: 2019-08-11 | End: 2019-08-11

## 2019-08-11 RX ORDER — CLOPIDOGREL BISULFATE 75 MG/1
75 TABLET ORAL DAILY
Status: DISCONTINUED | OUTPATIENT
Start: 2019-08-12 | End: 2019-08-12 | Stop reason: HOSPADM

## 2019-08-11 RX ORDER — PANTOPRAZOLE SODIUM 40 MG/1
40 TABLET, DELAYED RELEASE ORAL
Status: DISCONTINUED | OUTPATIENT
Start: 2019-08-12 | End: 2019-08-12 | Stop reason: HOSPADM

## 2019-08-11 RX ORDER — FUROSEMIDE 10 MG/ML
40 INJECTION INTRAMUSCULAR; INTRAVENOUS DAILY
Status: COMPLETED | OUTPATIENT
Start: 2019-08-12 | End: 2019-08-12

## 2019-08-11 RX ORDER — INSULIN GLARGINE 100 [IU]/ML
40 INJECTION, SOLUTION SUBCUTANEOUS 2 TIMES DAILY
Status: DISCONTINUED | OUTPATIENT
Start: 2019-08-11 | End: 2019-08-11

## 2019-08-11 RX ORDER — FUROSEMIDE 40 MG/1
40 TABLET ORAL DAILY
Status: DISCONTINUED | OUTPATIENT
Start: 2019-08-12 | End: 2019-08-11

## 2019-08-11 RX ORDER — METOCLOPRAMIDE 10 MG/1
5 TABLET ORAL DAILY
Status: DISCONTINUED | OUTPATIENT
Start: 2019-08-12 | End: 2019-08-12 | Stop reason: HOSPADM

## 2019-08-11 RX ORDER — ATORVASTATIN CALCIUM 80 MG/1
80 TABLET, FILM COATED ORAL DAILY
COMMUNITY
End: 2020-06-29 | Stop reason: SDUPTHER

## 2019-08-11 RX ORDER — DOCUSATE SODIUM 100 MG/1
100 CAPSULE, LIQUID FILLED ORAL 2 TIMES DAILY
Status: DISCONTINUED | OUTPATIENT
Start: 2019-08-11 | End: 2019-08-12 | Stop reason: HOSPADM

## 2019-08-11 RX ORDER — FINASTERIDE 5 MG/1
5 TABLET, FILM COATED ORAL DAILY
Status: DISCONTINUED | OUTPATIENT
Start: 2019-08-12 | End: 2019-08-12 | Stop reason: HOSPADM

## 2019-08-11 RX ORDER — TAMSULOSIN HYDROCHLORIDE 0.4 MG/1
0.4 CAPSULE ORAL
Status: DISCONTINUED | OUTPATIENT
Start: 2019-08-12 | End: 2019-08-12 | Stop reason: HOSPADM

## 2019-08-11 RX ADMIN — INSULIN LISPRO 2 UNITS: 100 INJECTION, SOLUTION INTRAVENOUS; SUBCUTANEOUS at 22:32

## 2019-08-11 RX ADMIN — FUROSEMIDE 40 MG: 10 INJECTION, SOLUTION INTRAMUSCULAR; INTRAVENOUS at 22:32

## 2019-08-11 NOTE — ED PROVIDER NOTES
Pt Name: Yvonne Aguilar  MRN: [id number]  [place] 1960  Age/Sex: 60 y o  male  Date of evaluation: 8/11/2019  PCP: Rebeca Hudson DO    CHIEF COMPLAINT    Chief Complaint   Patient presents with    Leg Swelling     Pt  right leg swelling for the last couple weeks  Pt  was seen by PCP and was givng medications to reduce swelling  Pt  reports lef feeling heavy and hard as a rock  HPI    Harjit Luong presents to the Emergency Department complaining of RLE pain and swelling  He had been to his PCP and was started on lasix and it does not seem to be helping  He is having more and more pain  He is concerned because when he had pain and swelling in the other leg his vascular work up led to cardiac clearance  His vascular surgery was then delayed by the need for CABG and then he eventually needed amputation          HPI      Past Medical and Surgical History    Past Medical History:   Diagnosis Date    Anemia     Anxiety and depression     Autonomic neuropathy     Cataract     Coronary artery disease     Diabetes mellitus ([address] )     Diabetic autonomic neuropathy associated with type 2 diabetes mellitus (HCC)     Last Assessed: 12/28/2017    Gastroparesis due to DM (HCC)     History of DVT (deep vein thrombosis)     left LE    HTN (hypertension)     Hyperlipidemia     Non healing left heel wound     Obesity     Orthostatic hypotension        Past Surgical History:   Procedure Laterality Date    LEG AMPUTATION THROUGH LOWER TIBIA AND FIBULA Left 8/3/2018    Procedure: AMPUTATION BELOW KNEE (BKA) L BKA;  Surgeon: Faiza Sky MD;  [facility]: [facility];  Service: Vascular    MT CABG, ARTERY-VEIN, FOUR N/A 3/28/2018    Procedure: CORONARY ARTERY BYPASS GRAFT (CABG) x3 VESSELS, LIMA TO LAD, SVG--> PDA, SVG--> OM2,  RIGHT LEG EVH/SVH TO , INTRA-OP AMANDEEP;  Surgeon: Aixa May MD;  Location: [facility];  Service: Cardiac Surgery    ROTATOR CUFF REPAIR Bilateral        Family History   Problem Relation Age of Onset    Atrial fibrillation Mother    Shagufta Jones Stroke Mother     Hypertension Father     Heart attack Paternal Grandfather 61       Social History     Tobacco Use    Smoking status: Former Smoker     Packs/day: 1 00     Years: 12 00     Pack years: 12 00     Types: Cigarettes     Last attempt to quit: 3/23/1994     Years since quittin 4    Smokeless tobacco: Never Used   Substance Use Topics    Alcohol use: No    Drug use: No              Allergies    Allergies   Allergen Reactions    Metformin      Allergy listed on nursing home paperwork       Home Medications    Prior to Admission medications    Medication Sig Start Date End Date Taking?  Authorizing Provider   atorvastatin (LIPITOR) 80 mg tablet Take 80 mg by mouth daily   Yes Historical Provider, MD   clopidogrel (PLAVIX) 75 mg tablet Take 1 tablet (75 mg total) by mouth daily 19  Yes Wanda Fernández DO   ferrous sulfate 325 (65 Fe) mg tablet Take 1 tablet (325 mg total) by mouth daily with breakfast 19  Yes Wanda Fernández DO   finasteride (PROSCAR) 5 mg tablet Take 1 tablet (5 mg total) by mouth daily 19  Yes Wanda Fernández DO   furosemide (LASIX) 40 mg tablet Take 1 tablet (40 mg total) by mouth as needed (Edema) 19  Yes Wanda Fernández DO   LANTUS SOLOSTAR 100 units/mL injection pen INJECT 80 UNITS TOTAL UNDER THE SKIN TWICE A DAY  Patient taking differently: 40 Units 2 (two) times a day  19  Yes Wanda Fernández DO   metoclopramide (REGLAN) 5 mg tablet Take 1 tablet (5 mg total) by mouth daily 19  Yes Wanda Fernández DO   pantoprazole (PROTONIX) 40 mg tablet Take 1 tablet (40 mg total) by mouth daily in the early morning for 194 days 19 Yes Wanda Fernández DO   tamsulosin (FLOMAX) 0 4 mg Take 1 capsule (0 4 mg total) by mouth daily with dinner 18  Yes Wanda Fernández DO   docusate sodium (COLACE) 100 mg capsule Take 1 capsule (100 mg total) by mouth 2 (two) times a day 19   Wanda Fernández DO   glucose blood test strip ReliOn Prime Meter Historical Provider, MD   insulin glargine (LANTUS) 100 units/mL subcutaneous injection Inject 40 Units under the skin 2 (two) times a day 12/12/18   Willie Roblero DO   midodrine (PROAMATINE) 5 mg tablet Take 5 mg by mouth Three times a day 3/2/17   Historical Provider, MD           Review of Systems    Review of Systems   Constitutional: Negative for activity change, appetite change, chills, fatigue and fever  HENT: Negative for congestion, rhinorrhea, sinus pressure, sneezing, sore throat and trouble swallowing  Eyes: Negative for photophobia and visual disturbance  Respiratory: Negative for chest tightness, shortness of breath and wheezing  Cardiovascular: Negative for chest pain and leg swelling  Gastrointestinal: Negative for abdominal distention, abdominal pain, constipation, diarrhea, nausea and vomiting  Endocrine: Negative for polydipsia, polyphagia and polyuria  Genitourinary: Negative for decreased urine volume, difficulty urinating, dysuria, flank pain, frequency and urgency  Musculoskeletal: Positive for gait problem  Negative for back pain, joint swelling and neck pain  Skin: Negative for color change, pallor and rash  Allergic/Immunologic: Negative for immunocompromised state  Neurological: Negative for seizures, syncope, speech difficulty, weakness, light-headedness and headaches  Psychiatric/Behavioral: Negative for confusion  All other systems reviewed and are negative  Physical Exam      ED Triage Vitals [08/11/19 1702]   Temperature Pulse Respirations Blood Pressure SpO2   97 7 °F (36 5 °C) 73 18 146/69 98 %      Temp Source Heart Rate Source Patient Position - Orthostatic VS BP Location FiO2 (%)   Oral Monitor Sitting Right arm --      Pain Score       No Pain               Physical Exam   Musculoskeletal:        Right knee: He exhibits decreased range of motion, swelling and effusion  He exhibits no ecchymosis, no deformity, no laceration and no erythema  Legs:             Assessment and Plan    Ramin Estrella is a 60 y o  male who presents with RLE swelling  Physical examination remarkable for same  Plan will be to perform diagnostic testing and treat symptomatically  MDM    Diagnostic Results      EKG INTERPRETATION  EKG Interpretation    Rate: 76 BPM  Rhythm: sinus  Axis: normal  Intervals: Normal, no blocks, QTc 425ms  T waves: abnormal  ST segments: nonspecific    Impression: abnormal EKG  EKG for comparison: reviewed  EKG interpreted by me  Interpretation by Jose Miguel Keyes DO  EKG reviewed and interpreted independently  Labs:    Results for orders placed or performed during the hospital encounter of 08/11/19   CBC and differential   Result Value Ref Range    WBC 7 20 4  31 - 10 16 Thousand/uL    RBC 4 79 3 88 - 5 62 Million/uL    Hemoglobin 13 4 12 0 - 17 0 g/dL    Hematocrit 41 3 36 5 - 49 3 %    MCV 86 82 - 98 fL    MCH 28 0 26 8 - 34 3 pg    MCHC 32 4 31 4 - 37 4 g/dL    RDW 13 2 11 6 - 15 1 %    MPV 9 9 8 9 - 12 7 fL    Platelets 363 399 - 095 Thousands/uL    nRBC 0 /100 WBCs    Neutrophils Relative 65 43 - 75 %    Immat GRANS % 0 0 - 2 %    Lymphocytes Relative 24 14 - 44 %    Monocytes Relative 6 4 - 12 %    Eosinophils Relative 4 0 - 6 %    Basophils Relative 1 0 - 1 %    Neutrophils Absolute 4 66 1 85 - 7 62 Thousands/µL    Immature Grans Absolute 0 02 0 00 - 0 20 Thousand/uL    Lymphocytes Absolute 1 69 0 60 - 4 47 Thousands/µL    Monocytes Absolute 0 46 0 17 - 1 22 Thousand/µL    Eosinophils Absolute 0 32 0 00 - 0 61 Thousand/µL    Basophils Absolute 0 05 0 00 - 0 10 Thousands/µL   Comprehensive metabolic panel   Result Value Ref Range    Sodium 137 136 - 145 mmol/L    Potassium 4 2 3 5 - 5 3 mmol/L    Chloride 102 100 - 108 mmol/L    CO2 28 21 - 32 mmol/L    ANION GAP 7 4 - 13 mmol/L    BUN 18 5 - 25 mg/dL    Creatinine 0 98 0 60 - 1 30 mg/dL    Glucose 203 (H) 65 - 140 mg/dL    Calcium 8 8 8 3 - 10 1 mg/dL    AST 19 5 - 45 U/L ALT 33 12 - 78 U/L    Alkaline Phosphatase 61 46 - 116 U/L    Total Protein 7 5 6 4 - 8 2 g/dL    Albumin 2 9 (L) 3 5 - 5 0 g/dL    Total Bilirubin 0 20 0 20 - 1 00 mg/dL    eGFR 83 ml/min/1 73sq m   Protime-INR   Result Value Ref Range    Protime 12 6 11 6 - 14 5 seconds    INR 0 93 0 84 - 1 19   APTT   Result Value Ref Range    PTT 29 23 - 37 seconds   Troponin I   Result Value Ref Range    Troponin I <0 02 <=0 04 ng/mL   B-type natriuretic peptide   Result Value Ref Range    NT-proBNP 266 (H) <125 pg/mL   Fingerstick Glucose (POCT)   Result Value Ref Range    POC Glucose 180 (H) 65 - 140 mg/dl   Fingerstick Glucose (POCT)   Result Value Ref Range    POC Glucose 153 (H) 65 - 140 mg/dl       All labs reviewed and utilized in the medical decision making process    Radiology:    XR chest 2 views    (Results Pending)   VAS lower limb venous duplex study, unilateral/limited    (Results Pending)   VAS lower limb arterial duplex, limited, unilateral    (Results Pending)       All radiology studies independently viewed by me and interpreted by the radiologist     Procedure    Procedures    [facility]      ED Course of Care and Re-Assessments        Medications   atorvastatin (LIPITOR) tablet 80 mg (has no administration in time range)   clopidogrel (PLAVIX) tablet 75 mg (has no administration in time range)   docusate sodium (COLACE) capsule 100 mg (100 mg Oral Not Given 8/11/19 2229)   ferrous sulfate tablet 325 mg (has no administration in time range)   finasteride (PROSCAR) tablet 5 mg (has no administration in time range)   metoclopramide (REGLAN) tablet 5 mg (has no administration in time range)   midodrine (PROAMATINE) tablet 5 mg (has no administration in time range)   pantoprazole (PROTONIX) EC tablet 40 mg (has no administration in time range)   tamsulosin (FLOMAX) capsule 0 4 mg (has no administration in time range)   enoxaparin (LOVENOX) subcutaneous injection 40 mg (has no administration in time range) insulin lispro (HumaLOG) 100 units/mL subcutaneous injection 2-12 Units (2 Units Subcutaneous Given 8/11/19 2232)   acetaminophen (TYLENOL) tablet 650 mg (has no administration in time range)   ondansetron (ZOFRAN) injection 4 mg (has no administration in time range)   insulin glargine (LANTUS) subcutaneous injection 50 Units 0 5 mL (has no administration in time range)   ALPRAZolam (XANAX) tablet 0 5 mg (has no administration in time range)   furosemide (LASIX) injection 40 mg (has no administration in time range)   metoprolol tartrate (LOPRESSOR) partial tablet 12 5 mg (has no administration in time range)   furosemide (LASIX) injection 40 mg (40 mg Intravenous Given 8/11/19 2232)           FINAL IMPRESSION    Final diagnoses:   Lower extremity edema         DISPOSITION/PLAN      Time reflects when diagnosis was documented in both MDM as applicable and the Disposition within this note     Time User Action Codes Description Comment    8/11/2019  8:18 PM Jane Gains L Add [R60 0] Lower extremity edema     8/11/2019  8:30 PM Joao Pillar Add [M79 89] Leg swelling     8/11/2019  8:30 PM Joao Pillar Add [Y50 910] Right leg pain       ED Disposition     ED Disposition Condition Date/Time Comment    Admit Stable Sun Aug 11, 2019  8:19 PM Case was discussed with JACLYN and the patient's admission status was agreed to be Admission Status: observation status to the service of Dr Berkley Hernandez   Follow-up Information    None           PATIENT REFERRED TO:    No follow-up provider specified      DISCHARGE MEDICATIONS:    Current Discharge Medication List      CONTINUE these medications which have NOT CHANGED    Details   atorvastatin (LIPITOR) 80 mg tablet Take 80 mg by mouth daily      clopidogrel (PLAVIX) 75 mg tablet Take 1 tablet (75 mg total) by mouth daily  Qty: 30 tablet, Refills: 5    Associated Diagnoses: S/P CABG (coronary artery bypass graft)      ferrous sulfate 325 (65 Fe) mg tablet Take 1 tablet (325 mg total) by mouth daily with breakfast  Qty: 30 tablet, Refills: 5    Associated Diagnoses: Chronic systolic congestive heart failure (HCC)      finasteride (PROSCAR) 5 mg tablet Take 1 tablet (5 mg total) by mouth daily  Qty: 30 tablet, Refills: 5    Associated Diagnoses: Urinary retention      furosemide (LASIX) 40 mg tablet Take 1 tablet (40 mg total) by mouth as needed (Edema)  Qty: 30 tablet, Refills: 0    Associated Diagnoses: Leg swelling      LANTUS SOLOSTAR 100 units/mL injection pen INJECT 80 UNITS TOTAL UNDER THE SKIN TWICE A DAY  Qty: 135 mL, Refills: 2    Associated Diagnoses: 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 ([facility])      metoclopramide (REGLAN) 5 mg tablet Take 1 tablet (5 mg total) by mouth daily  Qty: 30 tablet, Refills: 5    Associated Diagnoses: Chronic systolic congestive heart failure (HCC)      pantoprazole (PROTONIX) 40 mg tablet Take 1 tablet (40 mg total) by mouth daily in the early morning for 194 days  Qty: 30 tablet, Refills: 5    Associated Diagnoses: S/P BKA (below knee amputation), left ([facility])      tamsulosin (FLOMAX) 0 4 mg Take 1 capsule (0 4 mg total) by mouth daily with dinner  Qty: 60 capsule, Refills: 5    Associated Diagnoses: S/P CABG (coronary artery bypass graft)      docusate sodium (COLACE) 100 mg capsule Take 1 capsule (100 mg total) by mouth 2 (two) times a day  Qty: 60 capsule, Refills: 5    Associated Diagnoses: S/P CABG (coronary artery bypass graft)      glucose blood test strip ReliOn Prime Meter      insulin glargine (LANTUS) 100 units/mL subcutaneous injection Inject 40 Units under the skin 2 (two) times a day  Qty: 1200 Units, Refills: 1    Associated Diagnoses: Healthcare-associated pneumonia;  Non-healing wound of left heel; Elevated troponin; PAD (peripheral artery disease) ([facility]); S/P CABG x 3      midodrine (PROAMATINE) 5 mg tablet Take 5 mg by mouth Three times a day             No discharge procedures on file           Ema Gonsalez, [address] [place] [address], DO  08/11/19 7829

## 2019-08-12 ENCOUNTER — TELEPHONE (OUTPATIENT)
Dept: OTHER | Facility: OTHER | Age: 59
End: 2019-08-12

## 2019-08-12 ENCOUNTER — APPOINTMENT (OUTPATIENT)
Dept: NON INVASIVE DIAGNOSTICS | Facility: HOSPITAL | Age: 59
End: 2019-08-12
Payer: COMMERCIAL

## 2019-08-12 VITALS
OXYGEN SATURATION: 92 % | TEMPERATURE: 97.9 F | HEART RATE: 66 BPM | RESPIRATION RATE: 18 BRPM | SYSTOLIC BLOOD PRESSURE: 157 MMHG | DIASTOLIC BLOOD PRESSURE: 86 MMHG

## 2019-08-12 DIAGNOSIS — I73.9 PAD (PERIPHERAL ARTERY DISEASE) (HCC): Primary | ICD-10-CM

## 2019-08-12 PROBLEM — R33.9 URINARY RETENTION: Status: RESOLVED | Noted: 2018-08-07 | Resolved: 2019-08-12

## 2019-08-12 PROBLEM — I87.2 CHRONIC VENOUS INSUFFICIENCY: Status: ACTIVE | Noted: 2019-08-12

## 2019-08-12 LAB
ANION GAP SERPL CALCULATED.3IONS-SCNC: 8 MMOL/L (ref 4–13)
ATRIAL RATE: 76 BPM
BUN SERPL-MCNC: 18 MG/DL (ref 5–25)
CALCIUM SERPL-MCNC: 8.6 MG/DL (ref 8.3–10.1)
CHLORIDE SERPL-SCNC: 102 MMOL/L (ref 100–108)
CO2 SERPL-SCNC: 28 MMOL/L (ref 21–32)
CREAT SERPL-MCNC: 1.04 MG/DL (ref 0.6–1.3)
GFR SERPL CREATININE-BSD FRML MDRD: 78 ML/MIN/1.73SQ M
GLUCOSE SERPL-MCNC: 157 MG/DL (ref 65–140)
GLUCOSE SERPL-MCNC: 161 MG/DL (ref 65–140)
GLUCOSE SERPL-MCNC: 167 MG/DL (ref 65–140)
GLUCOSE SERPL-MCNC: 190 MG/DL (ref 65–140)
GLUCOSE SERPL-MCNC: 252 MG/DL (ref 65–140)
P AXIS: 63 DEGREES
POTASSIUM SERPL-SCNC: 4 MMOL/L (ref 3.5–5.3)
PR INTERVAL: 150 MS
QRS AXIS: -19 DEGREES
QRSD INTERVAL: 88 MS
QT INTERVAL: 378 MS
QTC INTERVAL: 425 MS
SODIUM SERPL-SCNC: 138 MMOL/L (ref 136–145)
T WAVE AXIS: 103 DEGREES
VENTRICULAR RATE: 76 BPM

## 2019-08-12 PROCEDURE — 93926 LOWER EXTREMITY STUDY: CPT

## 2019-08-12 PROCEDURE — 99217 PR OBSERVATION CARE DISCHARGE MANAGEMENT: CPT | Performed by: INTERNAL MEDICINE

## 2019-08-12 PROCEDURE — 93926 LOWER EXTREMITY STUDY: CPT | Performed by: SURGERY

## 2019-08-12 PROCEDURE — 80048 BASIC METABOLIC PNL TOTAL CA: CPT | Performed by: PHYSICIAN ASSISTANT

## 2019-08-12 PROCEDURE — 82948 REAGENT STRIP/BLOOD GLUCOSE: CPT

## 2019-08-12 PROCEDURE — 93922 UPR/L XTREMITY ART 2 LEVELS: CPT | Performed by: SURGERY

## 2019-08-12 PROCEDURE — 93971 EXTREMITY STUDY: CPT | Performed by: SURGERY

## 2019-08-12 PROCEDURE — 93010 ELECTROCARDIOGRAM REPORT: CPT | Performed by: INTERNAL MEDICINE

## 2019-08-12 PROCEDURE — 99244 OFF/OP CNSLTJ NEW/EST MOD 40: CPT | Performed by: PHYSICIAN ASSISTANT

## 2019-08-12 RX ADMIN — INSULIN LISPRO 2 UNITS: 100 INJECTION, SOLUTION INTRAVENOUS; SUBCUTANEOUS at 11:29

## 2019-08-12 RX ADMIN — TAMSULOSIN HYDROCHLORIDE 0.4 MG: 0.4 CAPSULE ORAL at 16:01

## 2019-08-12 RX ADMIN — METOPROLOL TARTRATE 12.5 MG: 25 TABLET ORAL at 08:26

## 2019-08-12 RX ADMIN — DOCUSATE SODIUM 100 MG: 100 CAPSULE, LIQUID FILLED ORAL at 08:26

## 2019-08-12 RX ADMIN — ACETAMINOPHEN 650 MG: 325 TABLET ORAL at 02:54

## 2019-08-12 RX ADMIN — ENOXAPARIN SODIUM 40 MG: 40 INJECTION SUBCUTANEOUS at 08:31

## 2019-08-12 RX ADMIN — FERROUS SULFATE TAB 325 MG (65 MG ELEMENTAL FE) 325 MG: 325 (65 FE) TAB at 08:25

## 2019-08-12 RX ADMIN — FINASTERIDE 5 MG: 5 TABLET, FILM COATED ORAL at 08:27

## 2019-08-12 RX ADMIN — FUROSEMIDE 40 MG: 10 INJECTION, SOLUTION INTRAMUSCULAR; INTRAVENOUS at 08:28

## 2019-08-12 RX ADMIN — INSULIN LISPRO 6 UNITS: 100 INJECTION, SOLUTION INTRAVENOUS; SUBCUTANEOUS at 16:00

## 2019-08-12 RX ADMIN — CLOPIDOGREL BISULFATE 75 MG: 75 TABLET ORAL at 08:25

## 2019-08-12 RX ADMIN — MIDODRINE HYDROCHLORIDE 5 MG: 5 TABLET ORAL at 06:04

## 2019-08-12 RX ADMIN — PANTOPRAZOLE SODIUM 40 MG: 40 TABLET, DELAYED RELEASE ORAL at 05:58

## 2019-08-12 RX ADMIN — INSULIN LISPRO 2 UNITS: 100 INJECTION, SOLUTION INTRAVENOUS; SUBCUTANEOUS at 08:39

## 2019-08-12 RX ADMIN — ATORVASTATIN CALCIUM 80 MG: 80 TABLET, FILM COATED ORAL at 16:00

## 2019-08-12 RX ADMIN — METOCLOPRAMIDE HYDROCHLORIDE 5 MG: 10 TABLET ORAL at 08:27

## 2019-08-12 RX ADMIN — MIDODRINE HYDROCHLORIDE 5 MG: 5 TABLET ORAL at 08:26

## 2019-08-12 RX ADMIN — INSULIN GLARGINE 50 UNITS: 100 INJECTION, SOLUTION SUBCUTANEOUS at 08:39

## 2019-08-12 NOTE — CASE MANAGEMENT
CM made two attempts to provide the patient with an observation notice, although both times the patient was unavailable

## 2019-08-12 NOTE — DISCHARGE SUMMARY
Transition of Care Discharge Summary - [facility]    Patient Information: Yakov Kitchen 60 y o  male MRN: [id number]  Unit/Bed#: E5 -01 Encounter: [id number]    Discharging Physician / Practitioner: Francoise Goldsmith MD  PCP: Neeta Mitchell DO  Admission Date: 8/11/2019  Discharge Date: 08/12/19    Disposition:      Other: home    For Discharges to   [address] SNF:   · Not Applicable to this Patient - Not Applicable to this Patient    Reason for Admission:  RLE edema    Discharge Diagnoses:     Principal Problem:    Leg swelling  Active Problems:    Anxiety and depression    Benign essential HTN    Hyperlipidemia    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 (HCC)    PAD (peripheral artery disease) (HCC)    S/P BKA (below knee amputation), left (HCC)    Chronic CHF ([facility] [address] )    Obstructive sleep apnea    Morbid obesity ([facility] )    CAD (coronary artery disease)    Chronic venous insufficiency  Resolved Problems:    Urinary retention      Consultations During Hospital Stay:  · Vascular surgery     Procedures Performed:     · none    Medication Adjustments and Discharge Medications:  · Medication Dosing Tapers - Please refer to Discharge Medication List for details on any medication dosing tapers (if applicable to patient)  · Discharge Medication List: See after visit summary for reconciled discharge medications  Wound Care Recommendations:  When applicable, please see wound care section of After Visit Summary  Diet Recommendations at Discharge:  Diet -        Diet Orders   (From admission, onward)             Start     Ordered    08/11/19 2118  Diet Cardiovascular; Sodium 4 Gm (LIBRA);  Consistent Carbohydrate Diet Level 2 (5 carb servings/75 grams CHO/meal)  Diet effective now     Question Answer Comment   Diet Type Cardiovascular    Cardiac Sodium 4 Gm (LIBRA)    Other Restriction(s): Consistent Carbohydrate Diet Level 2 (5 carb servings/75 grams CHO/meal)    RD to adjust diet per protocol? Yes        08/11/19 2117              Fluid Restriction - Continue Fluid Restriction as Listed Above at Discharge  Significant Findings / Test Results:     · Right lower extremity Doppler:  Negative for DVT  · Chest x-ray:  Stable posterior left basilar opacity may represent chronic loculated pleural effusion    Hospital Course:     Claude Burnham is a 60 y o  male patient who originally presented to the hospital on 8/11/2019 due to right lower extremity edema worsening for the past 2 weeks  Patient has history of peripheral arterial disease, chronic diastolic heart failure, CAD, hypertension, diabetes mellitus, peripheral neuropathy, diabetic gastroparesis and autonomic dysfunction, orthostatic hypotension, status post left BKA with prosthesis  Vascular surgery consulted right lower extremity arterial duplex revealed 50-75% right proximal popliteal stenosis, no evidence of acute or chronic right lower extremity ischemia with palpable distal pulses  Likely etiology patient right lower extremity edema secondary to chronic venous insufficiency, CHF, decreased mobility due to left BKA and right lower extremity dependency  Vascular surgery recommended to continue with compression stockings and elevation  Patient with follow with vascular surgery outpatient  Patient maintained on Plavix and statin for history of PID and CAD  He is started on a beta-blocker on admission, however review of patient's prior records from PCP office and Cardiology patient was not started on any beta-blockers due to orthostatic hypotension maintained on midodrine  At this point will not continue with beta-blocker upon discharge  Patient to closely follow with PCP and Cardiology outpatient      Condition at Discharge: good     Discharge Day Visit / Exam:     Subjective:  Patient seen examined at bedside, states he feels better, denies any chest pain, palpitations, dyspnea    Vitals: Blood Pressure: 157/86 (08/12/19 1501)  Pulse: 66 (08/12/19 1501)  Temperature: 97 9 °F (36 6 °C) (08/12/19 1501)  Temp Source: Temporal (08/12/19 1501)  Respirations: 18 (08/12/19 1501)  SpO2: 92 % (08/12/19 1501)    Physical Exam:    Constitutional: Patient is oriented to person, place and time, no acute distress  HEENT:  Normocephalic, atraumatic, EOMI, PERRLA, no scleral icterus, no pallor, moist oral mucosa  Neck:  Supple, no masses, no thyromegaly, no bruits Normal range of motion  Lymph nodes:  No lymphadenopathy  Cardiovascular: Normal S1S2, RRR, No murmurs/rubs/gallops appreciated  Pulmonary:  Bilateral air entry, No rhonchi/rales/wheezing appreciated  Abdominal: Soft, Bowel sounds present, Non-tender, Non-distended, No rebound/guarding, no hepatomegaly   Musculoskeletal: No tenderness/abnormality   Extremities:  Left lower extremity BKA, right lower extremity a 1 to 2+ pitting edema  Neurological: Cranial nerves II-XII grossly intact, sensation intact, otherwise no focal neurological symptoms  Discharge instructions/Information to patient and family:   See after visit summary section titled Discharge Instructions for information provided to patient and family  Planned Readmission: no     Discharge Statement:  I spent 30 minutes discharging the patient  This time was spent on the day of discharge  I had direct contact with the patient on the day of discharge  Greater than 50% of the total time was spent examining patient, answering all patient questions, arranging and discussing plan of care with patient as well as directly providing post-discharge instructions  Additional time then spent on discharge activities      ** Please Note: This note has been constructed using a voice recognition system **

## 2019-08-12 NOTE — ASSESSMENT & PLAN NOTE
54-year-old male known to the vascular surgery service with a history of HTN, HLD, chronic diastolic heart failure, poorly controlled type 2 DM with RLE neuropathy, CAD, s/p CABG x 3 3/28/18, chronic venous insufficiency and PAD w/L popliteal  and heel tissue loss, s/p endovascular recanalization ultimately followed by L BKA (ESDRAS) 8/3/2018 admitted with 2 week progressive RLE edema and pain  Vascular surgery consulted to rule out arterial occlusive disease  Diagnostics:  -LEVD 8/11/2019:  No evidence of acute or chronic RLE DVT or superficial thrombophlebitis  -ISAK 8/12/2019:  50-75% R proximal popliteal stenosis with R YUDITH 0 95 (previously 1 19)/123/93    Plan:  -no evidence of acute or chronic RLE ischemia with palpable distal pulses  -symptoms multifactorial to include chronic venous insufficiency, CHF and peripheral neuropathy exacerbated by immobility (due to L BKA) and RLE dependency  -recommend compression and elevation for symptomatic relief  -no vascular surgery intervention indicated or recommended  -continue outpatient follow-up for surveillance of known PAD  -will d/w Dr Jeremiah Rowan  Thank you for allowing us to participate in the care of this patient

## 2019-08-12 NOTE — CONSULTS
Consult- Elmer Tirado 1960, 60 y o  male MRN: [id number]    Unit/Bed#: E5 -01 Encounter: [id number]    Primary Care Provider: Tyrone Signs, DO   Date and time admitted to hospital: 8/11/2019  5:04 PM      Inpatient consult to Vascular Surgery  Consult performed by: Katie Rea PA-C  Consult ordered by: Bryant Logan PA-C          Chronic venous insufficiency  Assessment & Plan  25-year-old male known to the vascular surgery service with a history of HTN, HLD, chronic diastolic heart failure, poorly controlled type 2 DM with RLE neuropathy, CAD, s/p CABG x 3 3/28/18, chronic venous insufficiency and PAD w/L popliteal  and heel tissue loss, s/p endovascular recanalization ultimately followed by L BKA ([facility]) 8/3/2018 admitted with 2 week progressive RLE edema and pain  At her surgery consulted to rule out arterial occlusive disease  Diagnostics:  -LEVD 8/11/2019:  No evidence of acute or chronic RLE DVT or superficial thrombophlebitis  -ISAK 8/12/2019:  50-75% R proximal popliteal stenosis with R YUDITH 0 95 (previously 1 19)/123/93    Plan:  -no evidence of acute or chronic RLE ischemia with palpable distal pulses  -symptoms multifactorial to include chronic venous insufficiency, CHF and peripheral neuropathy exacerbated by immobility (due to L BKA) and RLE dependency  -recommend compression and elevation for symptomatic relief  -no vascular surgery intervention indicated or recommended  -continue outpatient follow-up for surveillance of known PAD  -will d/w Dr Barbara Spain  Thank you for allowing us to participate in the care of this patient      S/P BKA (below knee amputation), left ([facility])  Assessment & Plan  -L BKA prosthesis in place    PAD (peripheral artery disease) ([facility])  Assessment & Plan  PAD with bilateral popliteal disease  -s/p L BKA 8/3/2018 due to nonsalvageable limb following popliteal intervention for tissue loss  -50-75% R popliteal stenosis, asymptomatic with palpable distal pulses  -R YUDITH 0 95/123/93  -continue outpatient follow-up in surveillance  -see plan as outlined    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 [facility])  Assessment & Plan  Lab Results   Component Value Date    HGBA1C 9 5 (A) 06/24/2019       Recent Labs     08/11/19  2207 08/12/19  0556 08/12/19  0713 08/12/19  1102   POCGLU 153* 161* 167* 190*       Blood Sugar Average: Last 72 hrs:  (P) 170 2   -poorly controlled  -continue to optimize BS control for prevention of vascular disease  -management per primary service    Hyperlipidemia  Assessment & Plan  -stable  -continue statin therapy  -management per primary medical service    Benign essential HTN  Assessment & Plan  -BP well controlled  -continue current medical regimen  -management per primary medical service        Consulting Service: SLIM    Chief Complaint:  Increasing Right lower extremity edema with aching pain x2 week    HPI: Yvonne Aguilar is a 60 y o  male known to the vascular surgery service with a history of HTN, HLD, chronic diastolic heart failure, poorly controlled type 2 DM with RLE neuropathy, CAD, s/p CABG x 3 3/28/18, chronic venous insufficiency and PAD w/L popliteal  with heel tissue loss, s/p endovascular recanalization ultimately followed by L BKA ([facility]) 8/3/2018 who is now admitted with 2 week history of progressive RLE edema and pain  Vascular surgery consulted to rule out arterial occlusive disease  No prior formal right lower extremity arterial duplex in the past with the exception of ABIs  Last formal R YUDITH 1 2/176/126 in July 2018  Patient has done well since his L BKA is now discharged from [facility] rehab  Reports since leaving [facility] he is less mobile at home and only getting physical therapy once daily  Due to this, he has been sitting in a chair for longer periods of time  He complains of chronic right lower extremity neuropathy and edema prior to his L NATALIEA    Patient denies claudication, rest pain or right lower extremity tissue loss  L EVD on admission demonstrates no evidence of acute or chronic DVT or superficial thrombophlebitis  Biphasic arterial waveforms noted  Formal right lower extremity ISAK today on 8/12/2019 has been reviewed which demonstrates 50-75% popliteal stenosis with R YUDITH 0 95/123/93  Palpable right distal pulses on exam   Patient does not wear compression stocking or utilize elevation at home for edema control          Review of Systems:  General: negative  Cardiovascular: no chest pain or dyspnea on exertion  Respiratory: no cough, shortness of breath, or wheezing  Gastrointestinal: no abdominal pain, change in bowel habits, or black or bloody stools  Genitourinary ROS: no dysuria, trouble voiding, or hematuria  Musculoskeletal ROS: As per the above HPI  Neurological ROS: no TIA or stroke symptoms  Hematological and Lymphatic ROS: negative  Dermatological ROS: negative  Psychological ROS:  Depressed  Ophthalmic ROS: negative  ENT ROS: negative    Past Medical History:  Past Medical History:   Diagnosis Date    Anemia     Anxiety and depression     Autonomic neuropathy     Cataract     Coronary artery disease     Diabetes mellitus ([facility] [address] )     Diabetic autonomic neuropathy associated with type 2 diabetes mellitus (HCC)     Last Assessed: 12/28/2017    Gastroparesis due to DM (HCC)     History of DVT (deep vein thrombosis)     left LE    HTN (hypertension)     Hyperlipidemia     Non healing left heel wound     Obesity     Orthostatic hypotension        Past Surgical History:  Past Surgical History:   Procedure Laterality Date    LEG AMPUTATION THROUGH LOWER TIBIA AND FIBULA Left 8/3/2018    Procedure: AMPUTATION BELOW KNEE (BKA) L BKA;  Surgeon: Danae Park MD;  [facility];  Service: Vascular    PA CABG, ARTERY-VEIN, FOUR N/A 3/28/2018    Procedure: CORONARY ARTERY BYPASS GRAFT (CABG) x3 VESSELS, LIMA TO LAD, SVG--> PDA, SVG--> OM2,  RIGHT LEG EVH/SVH TO , INTRA-OP AMANDEEP;  Surgeon: Aliza Blunt MD;  [facility];  Service: Cardiac Surgery    ROTATOR CUFF REPAIR Bilateral        Social History:  Social History     Substance and Sexual Activity   Alcohol Use No     Social History     Substance and Sexual Activity   Drug Use No     Social History     Tobacco Use   Smoking Status Former Smoker    Packs/day: 1 00    Years: 12 00    Pack years: 12 00    Types: Cigarettes    Last attempt to quit: 3/23/1994    Years since quittin 4   Smokeless Tobacco Never Used       Family History:  Family History   Problem Relation Age of Onset    Atrial fibrillation Mother     Stroke Mother     Hypertension Father     Heart attack Paternal Grandfather 61       Allergies:   Allergies   Allergen Reactions    Metformin      Allergy listed on nursing home paperwork       Medications:  Current Facility-Administered Medications   Medication Dose Route Frequency    acetaminophen (TYLENOL) tablet 650 mg  650 mg Oral Q6H PRN    ALPRAZolam (XANAX) tablet 0 5 mg  0 5 mg Oral BID PRN    atorvastatin (LIPITOR) tablet 80 mg  80 mg Oral Daily With Dinner    clopidogrel (PLAVIX) tablet 75 mg  75 mg Oral Daily    docusate sodium (COLACE) capsule 100 mg  100 mg Oral BID    enoxaparin (LOVENOX) subcutaneous injection 40 mg  40 mg Subcutaneous Daily    ferrous sulfate tablet 325 mg  325 mg Oral Daily With Breakfast    finasteride (PROSCAR) tablet 5 mg  5 mg Oral Daily    insulin glargine (LANTUS) subcutaneous injection 50 Units 0 5 mL  50 Units Subcutaneous BID    insulin lispro (HumaLOG) 100 units/mL subcutaneous injection 2-12 Units  2-12 Units Subcutaneous 4x Daily (AC & HS)    metoclopramide (REGLAN) tablet 5 mg  5 mg Oral Daily    metoprolol tartrate (LOPRESSOR) partial tablet 12 5 mg  12 5 mg Oral Q12H [place]    midodrine (PROAMATINE) tablet 5 mg  5 mg Oral TID AC    ondansetron (ZOFRAN) injection 4 mg  4 mg Intravenous Q6H PRN    pantoprazole (PROTONIX) EC tablet 40 mg  40 mg Oral Early Morning    tamsulosin (FLOMAX) capsule 0 4 mg  0 4 mg Oral Daily With Dinner       Vitals:  /70 (BP Location: Right arm)   Pulse 73   Temp 98 1 °F (36 7 °C) (Temporal)   Resp 18   SpO2 97%     I/Os:  I/O last 24 hours: In: 670 [P O :670]  Out: 1075 [Urine:1075]    Lab Results and Cultures:   Lab Results   Component Value Date    WBC 7 20 08/11/2019    HGB 13 4 08/11/2019    HCT 41 3 08/11/2019    MCV 86 08/11/2019     08/11/2019     Lab Results   Component Value Date    GLUCOSE 165 (H) 03/28/2018    CALCIUM 8 6 08/12/2019     09/13/2016    K 4 0 08/12/2019    CO2 28 08/12/2019     08/12/2019    BUN 18 08/12/2019    CREATININE 1 04 08/12/2019     Lab Results   Component Value Date    INR 0 93 08/11/2019    INR 1 05 07/26/2018    INR 1 13 05/23/2018    PROTIME 12 6 08/11/2019    PROTIME 13 8 07/26/2018    PROTIME 14 6 (H) 05/23/2018       Lipid Panel:   Lab Results   Component Value Date    CHOL 214 (H) 09/13/2016   ,     Blood Culture:   Lab Results   Component Value Date    BLOODCX No Growth After 5 Days  07/26/2018    BLOODCX No Growth After 5 Days  07/26/2018   ,   Urinalysis:   Lab Results   Component Value Date    COLORU light yellow 09/21/2018    COLORU Yellow 05/23/2018    CLARITYU slightly turbid 09/21/2018    CLARITYU Clear 05/23/2018    SPECGRAV 1 020 05/23/2018    PHUR 7 5 05/23/2018    LEUKOCYTESUR neg 09/21/2018    LEUKOCYTESUR Negative 05/23/2018    NITRITE neg 09/21/2018    NITRITE Negative 05/23/2018    GLUCOSEU neg 09/21/2018    GLUCOSEU Negative 05/23/2018    KETONESU neg 09/21/2018    KETONESU Negative 05/23/2018    BILIRUBINUR neg 09/21/2018    BILIRUBINUR Negative 05/23/2018    BLOODU large 09/21/2018    BLOODU Small (A) 05/23/2018   ,   Urine Culture: No results found for: URINECX,   Wound Culure: No results found for: WOUNDCULT    Imaging:  LEVD 8/11/2019:  Imaging study reviewed and as described above  See full report below:  Segment    Right                    Left                    Impression       Valve   Impression         CFV        Normal (Patent)          Normal (Patent)    Popliteal                   Reflux                           CONCLUSION:     Impression:  TECHNICALLY CHALLENGING EVALUATION DUE TO SWELLING AND BODY HABITUS  RIGHT LOWER LIMB  No gross evidence of acute or chronic deep vein thrombosis  No evidence of superficial thrombophlebitis noted  Doppler evaluation shows a normal response to augmentation maneuvers  Popliteal, posterior tibial and anterior tibial arterial Doppler waveforms are  Biphasic/hyperemic  LEFT LOWER LIMB LIMITED  Evaluation shows no evidence of thrombus in the common femoral vein  Doppler evaluation shows a normal response to augmentation maneuvers  ISAK 8/12/2018:  Preliminary imaging study reviewed and as noted above  ISAK July 2018 with R YUDITH 1 2/176/126    Physical Exam:    General appearance: alert and oriented, in no acute distress  Skin: Skin color, texture, turgor normal  No rashes or lesions  Neurologic: Grossly normal  Head: Normocephalic, without obvious abnormality, atraumatic  Eyes: PERRL, EOMI, sclerae nonicteric  Throat: lips, mucosa, and tongue normal; teeth and gums normal  Neck: no adenopathy, no carotid bruit, no JVD, supple, symmetrical, trachea midline and thyroid not enlarged, symmetric, no tenderness/mass/nodules  Back: symmetric, no curvature  ROM normal  No CVA tenderness  Lungs: clear to auscultation bilaterally  Chest wall: no tenderness  Well-healed midline sternotomy scar  Sternum stable  Heart: regular rate and rhythm, S1, S2 normal, no murmur, click, rub or gallop  Abdomen: soft, non-tender; bowel sounds normal; no masses,  no organomegaly  Extremities: L BKA prosthesis in place  Right lower extremity warm, pink, well perfused with 2+ edema right lower leg  No venous ulcerations, tissue loss, gangrene or wounds    2+ DP pulse   Right lower extremity motor intact with decreased sensation right lower leg and foot which is chronic    Pulse exam:  Radial: Right: 2+ Left[de-identified] 2+  Femoral: Right: 2+ Left: 2+  Popliteal: Right: non-palpable   DP: Right: 2+   PT: Right: doppler signal and Difficult to palpate pulses secondary to edema     Sherida Canavan, PA-C  8/12/2019  [facility], [phone]

## 2019-08-12 NOTE — UTILIZATION REVIEW
Initial Clinical Review    Admission: Date/Time/Statement: Observation 8/11 @ 2017    Orders Placed This Encounter   Procedures    Place in Observation (expected length of stay for this patient is less than two midnights)     Standing Status:   Standing     Number of Occurrences:   1     Order Specific Question:   Admitting Physician     Answer:   Gary Guerra     Order Specific Question:   Level of Care     Answer:   Med Surg [16]     ED Arrival Information     Expected Arrival Acuity Means of Arrival Escorted By Service Admission Type    - 8/11/2019 16:57 Urgent Walk-In Family Member Hospitalist Urgent    Arrival Complaint    Leg Swelling        Chief Complaint   Patient presents with    Leg Swelling     Pt  right leg swelling for the last couple weeks  Pt  was seen by PCP and was givng medications to reduce swelling  Pt  reports lef feeling heavy and hard as a rock  Assessment/Plan:   58y Male to ED presents with persistent right leg swelling x2 weeks, b/l thigh tightness at night while sleeping and chronic edema  Stump intact w/mild edema  Venous duplex was negative for dvt limited by habitus  PMH PAD, Chronic diastolic CHF, CAD, HTN, HLD, DM c/b Peripheral neuropathy, Diabetic gastroparesis and autonomic dysfunction, poor ambulatory status, s/p left BKA w/prosthseis  Admit Observation level of care for Right Leg swelling, PAD, Morbid obesity, S/p Left BKA - due to failed popliteal recanalization and Uncontrolled DM - poorly controlled  No improvement note with daily lasix, check arterial duplex, elevate leg, start Iv diuretic, Na restriction, knee high compression stocking, Vascular Surgery and Nutrition consult  Continue statin/plavix and sliding scale insulin  8/12 Vascular Surgery cons,symptoms multifactorial to include chronic venous insufficiency, CHF and peripheral neuropathy exacerbated by immobility (due to L BKA) and RLE dependency   Recommend compression and elevation for symptomatic relief no vascular surgery intervention at this time    ED Triage Vitals [08/11/19 1702]   Temperature Pulse Respirations Blood Pressure SpO2   97 7 °F (36 5 °C) 73 18 146/69 98 %      Temp Source Heart Rate Source Patient Position - Orthostatic VS BP Location FiO2 (%)   Oral Monitor Sitting Right arm --      Pain Score       No Pain        Wt Readings from Last 1 Encounters:   06/24/19 124 kg (273 lb)     Additional Vital Signs:   Date/Time  Temp  Pulse  Resp  BP  SpO2  O2 Device    08/12/19 0705  98 1 °F (36 7 °C)  73  18  133/70  97 %  None (Room air)    08/11/19 2315  98 °F (36 7 °C)  68  18  119/61  95 %  None (Room air)    08/11/19 2203  97 °F (36 1 °C)Abnormal   67  18  116/70          Pertinent Labs/Diagnostic Test Results:   8/11 VAS lower limb venous duplex study - No gross evidence of acute or chronic deep vein thrombosis  no evidence of thrombus in the common femoral vein      8/11 CXR - pending    Results from last 7 days   Lab Units 08/11/19  1811   WBC Thousand/uL 7 20   HEMOGLOBIN g/dL 13 4   HEMATOCRIT % 41 3   PLATELETS Thousands/uL 237   NEUTROS ABS Thousands/µL 4 66         Results from last 7 days   Lab Units 08/12/19  0503 08/11/19  1811   SODIUM mmol/L 138 137   POTASSIUM mmol/L 4 0 4 2   CHLORIDE mmol/L 102 102   CO2 mmol/L 28 28   ANION GAP mmol/L 8 7   BUN mg/dL 18 18   CREATININE mg/dL 1 04 0 98   EGFR ml/min/1 73sq m 78 83   CALCIUM mg/dL 8 6 8 8     Results from last 7 days   Lab Units 08/11/19  1811   AST U/L 19   ALT U/L 33   ALK PHOS U/L 61   TOTAL PROTEIN g/dL 7 5   ALBUMIN g/dL 2 9*   TOTAL BILIRUBIN mg/dL 0 20     Results from last 7 days   Lab Units 08/12/19  0713 08/12/19  0556 08/11/19  2207 08/11/19  1828   POC GLUCOSE mg/dl 167* 161* 153* 180*     Results from last 7 days   Lab Units 08/12/19  0503 08/11/19  1811   GLUCOSE RANDOM mg/dL 157* 203*     Results from last 7 days   Lab Units 08/11/19  1811   TROPONIN I ng/mL <0 02         Results from last 7 days   Lab Units 08/11/19  1811   PROTIME seconds 12 6   INR  0 93   PTT seconds 29     Results from last 7 days   Lab Units 08/11/19  1811   NT-PRO BNP pg/mL 266*     ED Treatment:   Medication Administration from 08/11/2019 1657 to 08/11/2019 2038     None        Past Medical History:   Diagnosis Date    Anemia     Anxiety and depression     Autonomic neuropathy     Cataract     Coronary artery disease     Diabetes mellitus ([facility] )     Diabetic autonomic neuropathy associated with type 2 diabetes mellitus ([place] [address] )     Last Assessed: 12/28/2017    [facility] DM [facility]     History of DVT (deep vein thrombosis)     left LE    HTN (hypertension)     Hyperlipidemia     Non healing left heel wound     Obesity     Orthostatic hypotension      Present on Admission:   Benign essential HTN   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 ([facility])   Hyperlipidemia   PAD (peripheral artery disease) ([facility])   Obstructive sleep apnea   Urinary retention   Chronic CHF ([facility] )   Anxiety and depression    Admitting Diagnosis: Lower extremity edema [R60 0]  Leg swelling [M79 89]  Right leg pain [M79 604]     Age/Sex: 60 y o  male     Admission Orders:    IP CONSULT TO VASCULAR SURGERY    Current Facility-Administered Medications:  acetaminophen 650 mg Oral Q6H PRN 8/12 x1   ALPRAZolam 0 5 mg Oral BID PRN   atorvastatin 80 mg Oral Daily With Dinner   clopidogrel 75 mg Oral Daily   docusate sodium 100 mg Oral BID   enoxaparin 40 mg Subcutaneous Daily   ferrous sulfate 325 mg Oral Daily With Breakfast   finasteride 5 mg Oral Daily   furosemide 40 mg Intravenous Daily   insulin glargine 50 Units Subcutaneous BID   insulin lispro 2-12 Units Subcutaneous 4x Daily (AC & HS)   metoclopramide 5 mg Oral Daily   metoprolol tartrate 12 5 mg Oral Q12H [place]   midodrine 5 mg Oral TID AC   ondansetron 4 mg Intravenous Q6H PRN   pantoprazole 40 mg Oral Early Morning   tamsulosin 0 4 mg Oral Daily With [employer] Utilization Review Department  Phone: [phone]; Fax [phone]  [email or web address]  ATTENTION: Please call with any questions or concerns to [phone]  and carefully listen to the prompts so that you are directed to the right person  Send all requests for admission clinical reviews, approved or denied determinations and any other requests to fax [phone]   All voicemails are confidential

## 2019-08-12 NOTE — ASSESSMENT & PLAN NOTE
S/p cabg  Continue plavix/statin  Start low dose lopressor 12 5mg bid for prevention of all cause mortality/cardiac remodeling

## 2019-08-12 NOTE — ASSESSMENT & PLAN NOTE
PAD with bilateral popliteal disease  -s/p L BKA 8/3/2018 due to nonsalvageable limb following popliteal intervention for tissue loss  -50-75% R popliteal stenosis, asymptomatic with palpable distal pulses  -R YUDITH 0 95/123/93  -continue outpatient follow-up in surveillance  -see plan as outlined

## 2019-08-12 NOTE — ASSESSMENT & PLAN NOTE
S/p left BKA due to failed popliteal recanalization in 2018 w/prosthesis  Lead in 07/18 w/biphasic arterial wave form tiffanie of 1 2 and great toe and metatarsal pressures within healing range  Check arterial duplex, continue statin/plavix    Consult vascular to evaluate for consideration of leg claudication

## 2019-08-12 NOTE — ASSESSMENT & PLAN NOTE
Maintained on lasix  No longer on midodrine per pt  Continue iv lasix    Start low dose bb lopressor 12 5mg bid given hx of cad

## 2019-08-12 NOTE — TELEPHONE ENCOUNTER
Date/Time Called in:  08- @ 0824  Type of Consult: Non- Urgent  Facility: [facility]  Unit: [facility]  Room: [address] [facility] [address]  Phone: [phone]  Referring Physician: Dr Dany Moreno: Vascular  Patient: Abel Eaton Record Number: [id number]  Admitting Diagnosis:Leg Swelling   Reason for Consult: Right Leg Pain  Which Physician Notified: AS PER PROTOCOL ENTERED IN EPIC   Date/ Time Physician Notified:  AS PER PROTOCOL ENTERED IN EPIC

## 2019-08-12 NOTE — ASSESSMENT & PLAN NOTE
Lab Results   Component Value Date    HGBA1C 9 5 (A) 06/24/2019       Recent Labs     08/11/19  1828   POCGLU 180*       Blood Sugar Average: Last 72 hrs:  (P) 180   Poorly controlled IDDM maintained on lantus 50 iu bid   Start ssi and poc bs

## 2019-08-12 NOTE — H&P
H&P- Shay Duron 1960, 60 y o  male MRN: [id number]    Unit/Bed#: E5 -01 Encounter: [id number]    Primary Care Provider: Trevon Montero DO   Date and time admitted to hospital: 8/11/2019  5:04 PM      * Leg swelling  Assessment & Plan  Likely 2* edema from poor mobility high salt intake  Did not have improvement w/lasix 40mg po daily, no evidence of compartment syndrome or acute infection  bnp not suggestive of acute chf  Venous duplex negative for dvt limited by habitus  Admitted for concern for leg claudication although pulses notably intact per ED DP pulse is 2+ PT cannot be palpated due to edema  Nursing to confirm w/dopplers    Pt known to [facility]  Check arterial duplex, supportive care, elevate leg  Start IV lasix 40mg once today and again tomorrow  Apply knee high compression stocking  nsa diet and educated on 4g/day Na restriction  Consult nutrition and vascular surgery for evaluation    PAD (peripheral artery disease) ([facility] )  Assessment & Plan  S/p left BKA due to failed popliteal recanalization in 2018 w/prosthesis  Lead in 07/18 w/biphasic arterial wave form tiffanie of 1 2 and great toe and metatarsal pressures within healing range  Check arterial duplex, continue statin/plavix  Consult vascular to evaluate for consideration of leg claudication    CAD (coronary artery disease)  Assessment & Plan  S/p cabg  Continue plavix/statin  Start low dose lopressor 12 5mg bid for prevention of all cause mortality/cardiac remodeling    Morbid obesity ([facility] [address] )  Assessment & Plan  Encouraged weight loss    Urinary retention  Assessment & Plan  Hx of in setting of bph  Continue flomax/proscar    Chronic CHF [facility])  Assessment & Plan  Wt Readings from Last 3 Encounters:   06/24/19 124 kg (273 lb)   12/12/18 116 kg (256 lb)   09/21/18 107 kg (236 lb)         bnp in 200s no evidence of volume overload on cxr  Recently started on lasix for pedal edema  Unclear why not on bb    Continue lasix, if bp permits start on bb    S/P BKA (below knee amputation), left [facility])  Assessment & Plan  Stump intact w/mild edema  Well healed w/no evidence of skin wounds and well fitted prosthesis    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 [facility])  Assessment & Plan  Lab Results   Component Value Date    HGBA1C 9 5 (A) 06/24/2019       Recent Labs     08/11/19  1828   POCGLU 180*       Blood Sugar Average: Last 72 hrs:  (P) 180   Poorly controlled IDDM maintained on lantus 50 iu bid   Start ssi and poc bs    Hyperlipidemia  Assessment & Plan  Continue high dose statin    Benign essential HTN  Assessment & Plan  Maintained on lasix  No longer on midodrine per pt  Continue iv lasix  Start low dose bb lopressor 12 5mg bid given hx of cad    Anxiety and depression  Assessment & Plan  Not presently on any medications  Start Xanax 0 5mg bid prn        VTE Prophylaxis: Enoxaparin (Lovenox)  / sequential compression device   Code Status:   POLST: There is no POLST form on file for this patient (pre-hospital)  Discussion with family: none    Anticipated Length of Stay:  Patient will be admitted on an Observation basis with an anticipated length of stay of  Less than 2 midnights  Justification for Hospital Stay: r/o claudication    Total Time for Visit, including Counseling / Coordination of Care: 45 minutes  Greater than 50% of this total time spent on direct patient counseling and coordination of care  Chief Complaint:   Right leg pain swelling x 2 weeks    History of Present Illness:    Sandra Cui is a 60 y o  male who presents with pmh of pad, chronic diastolic chf, cad, htn, hld, dm c/b peripheral neuropathy, diabetic gastroparesis and autonomic dysfunction, poor ambulatory status, s/p left BKA w/prosthseis  He noticed 2 weeks of worsening persistent swelling of right leg  He reports that it feels 'like it's going to explode,' but notes that it is only modestly uncomfortable    He does not note any exertional pain in this leg although has difficulty ambulating due to chronic neuropathy  He notes b/l thigh tightness at night when sleeping up in his recliner and wonders if this is due to tightness in his legs  He does not note this throughout the entire leg nor is it improved with dangling his feet over the bed  He reports chronically he has problems with edema in his right leg but it was transient  He was started on lasix for this 40mg po daily  He has not been watching his salt intake as his wife works doubles and often does not have time to cook  He did get an abrasive injury to lateral right leg below tib plateau due to rubbing it against a wheel chair but this is healing and denies any other trauma, fevers/chills/redness  He came to the ed because 'this was how it started with my left leg and then we couldn't get to it in time and I don't want to end up with another amputation '  His pulses were intact in the ED  Venous duplex was negative for dvt limited by habitus  Pt unfortunately missed [facility] earlier this year but was scheduled to have a LE arterial duplex  His leads in 07/2018 were w/tiffanie of 1 2 biphasic arterial waveforms and great toe and metatarsal pressures within healing range  We are requested to admit pt to observation for evaluation of right leg discomfort and swelling for possible leg claudication        Review of Systems:    Review of Systems   Constitutional: Negative for chills and fever  Respiratory: Negative for shortness of breath  Cardiovascular: Positive for leg swelling  Negative for chest pain  Gastrointestinal: Negative for abdominal pain, nausea and vomiting  Musculoskeletal: Positive for gait problem  Neurological: Positive for numbness  All other systems reviewed and are negative        Past Medical and Surgical History:     Past Medical History:   Diagnosis Date    Anemia     Anxiety and depression     Autonomic neuropathy     Cataract     Coronary artery disease     Diabetes mellitus ([facility] )     Diabetic autonomic neuropathy associated with type 2 diabetes mellitus ([facility] )     Last Assessed: 12/28/2017    Gastroparesis due to DM (HCC)     History of DVT (deep vein thrombosis)     left LE    HTN (hypertension)     Hyperlipidemia     Non healing left heel wound     Obesity     Orthostatic hypotension        Past Surgical History:   Procedure Laterality Date    LEG AMPUTATION THROUGH LOWER TIBIA AND FIBULA Left 8/3/2018    Procedure: AMPUTATION BELOW KNEE (BKA) L BKA;  Surgeon: Zena Soto MD;  Location: [facility];  Service: Vascular    [place] CABG, ARTERY-VEIN, FOUR N/A 3/28/2018    Procedure: CORONARY ARTERY BYPASS GRAFT (CABG) x3 VESSELS, LIMA TO LAD, SVG--> PDA, SVG--> OM2,  RIGHT LEG EVH/SVH TO , INTRA-OP AMANDEEP;  Surgeon: Chris Mcmahon MD;  Location: [facility];  Service: Cardiac Surgery    ROTATOR CUFF REPAIR Bilateral        Meds/Allergies:    Prior to Admission medications    Medication Sig Start Date End Date Taking?  Authorizing Provider   atorvastatin (LIPITOR) 80 mg tablet Take 80 mg by mouth daily   Yes Historical Provider, MD   clopidogrel (PLAVIX) 75 mg tablet Take 1 tablet (75 mg total) by mouth daily 6/24/19  Yes Kimberly Anna DO   ferrous sulfate 325 (65 Fe) mg tablet Take 1 tablet (325 mg total) by mouth daily with breakfast 6/24/19  Yes Kimberly Anna DO   finasteride (PROSCAR) 5 mg tablet Take 1 tablet (5 mg total) by mouth daily 6/24/19  Yes Kimberly Anna DO   furosemide (LASIX) 40 mg tablet Take 1 tablet (40 mg total) by mouth as needed (Edema) 7/18/19  Yes Kimberly Anna DO   LANTUS SOLOSTAR 100 units/mL injection pen INJECT 80 UNITS TOTAL UNDER THE SKIN TWICE A DAY  Patient taking differently: 40 Units 2 (two) times a day  4/22/19  Yes Kimberly Anna DO   metoclopramide (REGLAN) 5 mg tablet Take 1 tablet (5 mg total) by mouth daily 6/24/19  Yes Kimberly Anna DO   pantoprazole (PROTONIX) 40 mg tablet Take 1 tablet (40 mg total) by mouth daily in the early morning for 194 days 19 Yes Alisha Acosta DO   tamsulosin [facility]) 0 4 mg Take 1 capsule (0 4 mg total) by mouth daily with dinner 18  Yes Alisha Acosta DO   docusate sodium (COLACE) 100 mg capsule Take 1 capsule (100 mg total) by mouth 2 (two) times a day 19   Alisha Acosta DO   glucose blood test strip ReliOn Prime Meter    Historical Provider, MD   insulin glargine (LANTUS) 100 units/mL subcutaneous injection Inject 40 Units under the skin 2 (two) times a day 18   Alisha Acosta DO   midodrine (PROAMATINE) 5 mg tablet Take 5 mg by mouth Three times a day 3/2/17   Historical Provider, MD     I have reviewed home medications with patient personally  Allergies: Allergies   Allergen Reactions    Metformin      Allergy listed on nursing home paperwork       Social History:     Marital Status: /Civil Union   Occupation:   Patient Pre-hospital Living Situation:   Patient Pre-hospital Level of Mobility:   Patient Pre-hospital Diet Restrictions:   Substance Use History:   Social History     Substance and Sexual Activity   Alcohol Use No     Social History     Tobacco Use   Smoking Status Former Smoker    Packs/day: 1 00    Years: 12 00    Pack years: 12 00    Types: Cigarettes    Last attempt to quit: 3/23/1994    Years since quittin 4   Smokeless Tobacco Never Used     Social History     Substance and Sexual Activity   Drug Use No       Family History:    Family History   Problem Relation Age of Onset    Atrial fibrillation Mother     Stroke Mother     Hypertension Father     Heart attack Paternal Grandfather 61       Physical Exam:     Vitals:   Blood Pressure: 140/60 (19)  Pulse: 72 (19)  Temperature: (!) 97 1 °F (36 2 °C) (19)  Temp Source: Temporal (19)  Respirations: 18 (19)  SpO2: 93 % (19)    Physical Exam   Constitutional: He appears well-developed and well-nourished     Appears older than stated age, obese, nad   HENT:   Head: Normocephalic and atraumatic  Right Ear: External ear normal    Left Ear: External ear normal    Eyes: Conjunctivae are normal    Neck: Normal range of motion  Cardiovascular: Normal rate, regular rhythm and normal heart sounds  Exam reveals no gallop and no friction rub  No murmur heard  Right LE DP is 2+ PT not palpable due to edema  Pulmonary/Chest: Effort normal and breath sounds normal  No respiratory distress  He has no wheezes  He has no rales  Abdominal: Soft  He exhibits no distension and no mass  There is no tenderness  There is no guarding  Musculoskeletal: He exhibits edema (3+ pitting edema at pretib area of right leg  1+ at left tib plateau at bony prominence below patella)  Left BKA noted  Neurological: He is alert  Skin: Skin is warm and dry  He is not diaphoretic  Left stump of LLE intact w/no dehiscence or wounds  Right lateral lower leg with mild healing ulcerations at level of tib plateau  These appear superficial and epithelialized  Right anterior lower leg with mild venous stasis dermatitis  Not warm or tender  Psychiatric: He has a normal mood and affect  Vitals reviewed  (  Be Sure to Include Physical Exam: Delete this entire line when you have entered your exam)    Additional Data:     Lab Results: I have personally reviewed pertinent reports        Results from last 7 days   Lab Units 08/11/19  1811   WBC Thousand/uL 7 20   HEMOGLOBIN g/dL 13 4   HEMATOCRIT % 41 3   PLATELETS Thousands/uL 237   NEUTROS PCT % 65   LYMPHS PCT % 24   MONOS PCT % 6   EOS PCT % 4     Results from last 7 days   Lab Units 08/11/19  1811   SODIUM mmol/L 137   POTASSIUM mmol/L 4 2   CHLORIDE mmol/L 102   CO2 mmol/L 28   BUN mg/dL 18   CREATININE mg/dL 0 98   ANION GAP mmol/L 7   CALCIUM mg/dL 8 8   ALBUMIN g/dL 2 9*   TOTAL BILIRUBIN mg/dL 0 20   ALK PHOS U/L 61   ALT U/L 33   AST U/L 19   GLUCOSE RANDOM mg/dL 203*     Results from last 7 days   Lab Units 08/11/19  1811   INR  0 93     Results from last 7 days   Lab Units 08/11/19  1828   POC GLUCOSE mg/dl 180*               Imaging: I have personally reviewed pertinent reports  cxr reviewed by myself  No vascular congestion or infiltrate noted  XR chest 2 views    (Results Pending)   VAS lower limb venous duplex study, unilateral/limited    (Results Pending)   VAS lower limb arterial duplex, limited, unilateral    (Results Pending)       EKG, Pathology, and Other Studies Reviewed on Admission:   · EKG:     Allscripts / Epic Records Reviewed: Yes     ** Please Note: This note has been constructed using a voice recognition system   **

## 2019-08-12 NOTE — PLAN OF CARE
Problem: CARDIOVASCULAR - ADULT  Goal: Maintains optimal cardiac output and hemodynamic stability  Description  INTERVENTIONS:  - Monitor I/O, vital signs and rhythm  - Monitor for S/S and trends of decreased cardiac output i e  bleeding, hypotension  - Administer and titrate ordered vasoactive medications to optimize hemodynamic stability  - Assess quality of pulses, skin color and temperature  - Assess for signs of decreased coronary artery perfusion - ex   Angina  - Instruct patient to report change in severity of symptoms  Outcome: Progressing  Goal: Absence of cardiac dysrhythmias or at baseline rhythm  Description  INTERVENTIONS:  - Continuous cardiac monitoring, monitor vital signs, obtain 12 lead EKG if indicated  - Administer antiarrhythmic and heart rate control medications as ordered  - Monitor electrolytes and administer replacement therapy as ordered  Outcome: Progressing     Problem: METABOLIC, FLUID AND ELECTROLYTES - ADULT  Goal: Electrolytes maintained within normal limits  Description  INTERVENTIONS:  - Monitor labs and assess patient for signs and symptoms of electrolyte imbalances  - Administer electrolyte replacement as ordered  - Monitor response to electrolyte replacements, including repeat lab results as appropriate  - Instruct patient on fluid and nutrition as appropriate  Outcome: Progressing  Goal: Fluid balance maintained  Description  INTERVENTIONS:  - Monitor labs and assess for signs and symptoms of volume excess or deficit  - Monitor I/O and WT  - Instruct patient on fluid and nutrition as appropriate  Outcome: Progressing  Goal: Glucose maintained within target range  Description  INTERVENTIONS:  - Monitor Blood Glucose as ordered  - Assess for signs and symptoms of hyperglycemia and hypoglycemia  - Administer ordered medications to maintain glucose within target range  - Assess nutritional intake and initiate nutrition service referral as needed  Outcome: Progressing     Problem: SKIN/TISSUE INTEGRITY - ADULT  Goal: Skin integrity remains intact  Description  INTERVENTIONS  - Identify patients at risk for skin breakdown  - Assess and monitor skin integrity  - Assess and monitor nutrition and hydration status  - Monitor labs (i e  albumin)  - Assess for incontinence   - Turn and reposition patient  - Assist with mobility/ambulation  - Relieve pressure over bony prominences  - Avoid friction and shearing  - Provide appropriate hygiene as needed including keeping skin clean and dry  - Evaluate need for skin moisturizer/barrier cream  - Collaborate with interdisciplinary team (i e  Nutrition, Rehabilitation, etc )   - Patient/family teaching  Outcome: Progressing  Goal: Incision(s), wounds(s) or drain site(s) healing without S/S of infection  Description  INTERVENTIONS  - Assess and document risk factors for skin impairment   - Assess and document dressing, incision, wound bed, drain sites and surrounding tissue  - Initiate Nutrition services consult and/or wound management as needed  Outcome: Progressing  Goal: Oral mucous membranes remain intact  Description  INTERVENTIONS  - Assess oral mucosa and hygiene practices  - Implement preventative oral hygiene regimen  - Implement oral medicated treatments as ordered  - Initiate Nutrition services referral as needed  Outcome: Progressing     Problem: HEMATOLOGIC - ADULT  Goal: Maintains hematologic stability  Description  INTERVENTIONS  - Assess for signs and symptoms of bleeding or hemorrhage  - Monitor labs  - Administer supportive blood products/factors as ordered and appropriate  Outcome: Progressing     Problem: Nutrition/Hydration-ADULT  Goal: Nutrient/Hydration intake appropriate for improving, restoring or maintaining nutritional needs  Description  Monitor and assess patient's nutrition/hydration status for malnutrition (ex- brittle hair, bruises, dry skin, pale skin and conjunctiva, muscle wasting, smooth red tongue, and disorientation)  Collaborate with interdisciplinary team and initiate plan and interventions as ordered  Monitor patient's weight and dietary intake as ordered or per policy  Utilize nutrition screening tool and intervene per policy  Determine patient's food preferences and provide high-protein, high-caloric foods as appropriate       INTERVENTIONS:  - Monitor oral intake, urinary output, labs, and treatment plans  - Assess nutrition and hydration status and recommend course of action  - Evaluate amount of meals eaten  - Assist patient with eating if necessary   - Allow adequate time for meals  - Recommend/ encourage appropriate diets, oral nutritional supplements, and vitamin/mineral supplements  - Order, calculate, and assess calorie counts as needed  - Recommend, monitor, and adjust tube feedings and TPN/PPN based on assessed needs  - Assess need for intravenous fluids  - Provide specific nutrition/hydration education as appropriate  - Include patient/family/caregiver in decisions related to nutrition  Outcome: Progressing     Problem: PAIN - ADULT  Goal: Verbalizes/displays adequate comfort level or baseline comfort level  Description  Interventions:  - Encourage patient to monitor pain and request assistance  - Assess pain using appropriate pain scale  - Administer analgesics based on type and severity of pain and evaluate response  - Implement non-pharmacological measures as appropriate and evaluate response  - Consider cultural and social influences on pain and pain management  - Notify physician/advanced practitioner if interventions unsuccessful or patient reports new pain  Outcome: Progressing     Problem: INFECTION - ADULT  Goal: Absence or prevention of progression during hospitalization  Description  INTERVENTIONS:  - Assess and monitor for signs and symptoms of infection  - Monitor lab/diagnostic results  - Monitor all insertion sites, i e  indwelling lines, tubes, and drains  - Monitor endotracheal (as able) and nasal secretions for changes in amount and color  - Faith appropriate cooling/warming therapies per order  - Administer medications as ordered  - Instruct and encourage patient and family to use good hand hygiene technique  - Identify and instruct in appropriate isolation precautions for identified infection/condition  Outcome: Progressing  Goal: Absence of fever/infection during neutropenic period  Description  INTERVENTIONS:  - Monitor WBC  - Implement neutropenic guidelines  Outcome: Progressing     Problem: SAFETY ADULT  Goal: Patient will remain free of falls  Description  INTERVENTIONS:  - Assess patient frequently for physical needs  -  Identify cognitive and physical deficits and behaviors that affect risk of falls    -  Faith fall precautions as indicated by assessment   - Educate patient/family on patient safety including physical limitations  - Instruct patient to call for assistance with activity based on assessment  - Modify environment to reduce risk of injury  - Consider OT/PT consult to assist with strengthening/mobility  Outcome: Progressing  Goal: Maintain or return to baseline ADL function  Description  INTERVENTIONS:  -  Assess patient's ability to carry out ADLs; assess patient's baseline for ADL function and identify physical deficits which impact ability to perform ADLs (bathing, care of mouth/teeth, toileting, grooming, dressing, etc )  - Assess/evaluate cause of self-care deficits   - Assess range of motion  - Assess patient's mobility; develop plan if impaired  - Assess patient's need for assistive devices and provide as appropriate  - Encourage maximum independence but intervene and supervise when necessary  ¯ Involve family in performance of ADLs  ¯ Assess for home care needs following discharge   ¯ Request OT consult to assist with ADL evaluation and planning for discharge  ¯ Provide patient education as appropriate  Outcome: Progressing  Goal: Maintain or return mobility status to optimal level  Description  INTERVENTIONS:  - Assess patient's baseline mobility status (ambulation, transfers, stairs, etc )    - Identify cognitive and physical deficits and behaviors that affect mobility  - Identify mobility aids required to assist with transfers and/or ambulation (gait belt, sit-to-stand, lift, walker, cane, etc )  - [place] fall precautions as indicated by assessment  - Record patient progress and toleration of activity level on Mobility SBAR; progress patient to next Phase/Stage  - Instruct patient to call for assistance with activity based on assessment  - Request Rehabilitation consult to assist with strengthening/weightbearing, etc   Outcome: Progressing     Problem: DISCHARGE PLANNING  Goal: Discharge to home or other facility with appropriate resources  Description  INTERVENTIONS:  - Identify barriers to discharge w/patient and caregiver  - Arrange for needed discharge resources and transportation as appropriate  - Identify discharge learning needs (meds, wound care, etc )  - Arrange for interpretive services to assist at discharge as needed  - Refer to Case Management Department for coordinating discharge planning if the patient needs post-hospital services based on physician/advanced practitioner order or complex needs related to functional status, cognitive ability, or social support system  Outcome: Progressing     Problem: Knowledge Deficit  Goal: Patient/family/caregiver demonstrates understanding of disease process, treatment plan, medications, and discharge instructions  Description  Complete learning assessment and assess knowledge base    Interventions:  - Provide teaching at level of understanding  - Provide teaching via preferred learning methods  Outcome: Progressing

## 2019-08-12 NOTE — ASSESSMENT & PLAN NOTE
Wt Readings from Last 3 Encounters:   06/24/19 124 kg (273 lb)   12/12/18 116 kg (256 lb)   09/21/18 107 kg (236 lb)         bnp in 200s no evidence of volume overload on cxr  Recently started on lasix for pedal edema  Unclear why not on bb    Continue lasix, if bp permits start on bb

## 2019-08-12 NOTE — ASSESSMENT & PLAN NOTE
Likely 2* edema from poor mobility high salt intake  Did not have improvement w/lasix 40mg po daily, no evidence of compartment syndrome or acute infection  bnp not suggestive of acute chf  Venous duplex negative for dvt limited by habitus  Admitted for concern for leg claudication although pulses notably intact per ED DP pulse is 2+ PT cannot be palpated due to edema  Nursing to confirm w/dopplers    Pt known to [facility]  Check arterial duplex, supportive care, elevate leg  Start IV lasix 40mg once today and again tomorrow  Apply knee high compression stocking   nsa diet and educated on 4g/day Na restriction  Consult nutrition and vascular surgery for evaluation

## 2019-08-12 NOTE — ASSESSMENT & PLAN NOTE
Lab Results   Component Value Date    HGBA1C 9 5 (A) 06/24/2019       Recent Labs     08/11/19  2207 08/12/19  0556 08/12/19  0713 08/12/19  1102   POCGLU 153* 161* 167* 190*       Blood Sugar Average: Last 72 hrs:  (P) 170 2   -poorly controlled  -continue to optimize BS control for prevention of vascular disease  -management per primary service

## 2019-08-13 ENCOUNTER — TRANSITIONAL CARE MANAGEMENT (OUTPATIENT)
Dept: FAMILY MEDICINE CLINIC | Facility: CLINIC | Age: 59
End: 2019-08-13

## 2019-08-15 ENCOUNTER — TELEPHONE (OUTPATIENT)
Dept: VASCULAR SURGERY | Facility: CLINIC | Age: 59
End: 2019-08-15

## 2019-08-15 NOTE — TELEPHONE ENCOUNTER
CALLED PT TO SCHEDULE - LEFT MESSAGE     From: Chris Leyva   Sent: Monday, August 12, 2019 4:47 PM  To: Vascular Scheduling  Subject: outpatient f/u with ISAK in 6 months for PAD surveillance    Ami Fothergill  Preferred Name:   None  Sex:   Male  60 y o , 1960 MRN:   [id number]  CSN:   [id number]  [place]:   [address] [place] [address]  Bed:   E5 -01     This is a inpatient at [facility] that I saw in consult today  Patient known to our practice due to PAD with left popliteal occlusion, status post endovascular intervention ultimately followed by SHELLY WALSH by Dr Gonzalez Mg August 2018  Patient now admitted with worsening chronic right lower extremity edema related to venous stasis and neuropathy  However, patient with right popliteal stenosis that needs long-term surveillance  Please schedule for follow-up appointment in the office in 6 months with repeat ISAK  Appointment can be with advanced practitioner  Please call the  patient with scheduled appointments as he is being discharged presently        Nova Blizzard, PA-C  [facility]  Ph:  [phone]  Fax:  [phone]

## 2019-08-23 DIAGNOSIS — M79.89 LEG SWELLING: ICD-10-CM

## 2019-08-23 RX ORDER — FUROSEMIDE 40 MG/1
40 TABLET ORAL AS NEEDED
Qty: 30 TABLET | Refills: 0 | Status: SHIPPED | OUTPATIENT
Start: 2019-08-23 | End: 2019-09-24 | Stop reason: SDUPTHER

## 2019-09-16 NOTE — PLAN OF CARE
Problem: OCCUPATIONAL THERAPY ADULT  Goal: Performs self-care activities at highest level of function for planned discharge setting  See evaluation for individualized goals  Treatment Interventions: ADL retraining, Functional transfer training, Endurance training, Cognitive reorientation, Patient/family training, Equipment evaluation/education, Compensatory technique education, Continued evaluation, Energy conservation, Activityengagement          See flowsheet documentation for full assessment, interventions and recommendations  Outcome: Progressing  Limitation: Decreased ADL status, Decreased endurance, Decreased sensation, Decreased self-care trans, Decreased high-level ADLs  Prognosis: Fair  Assessment: Pt participated in OT tx session focusing on morning ADL routine  Pt performed seated in chair 2* significant fatigue, decreased standing tolerance, WBS/precautions, cardiac/sternal precautions, orthostatic hypotension and for safety, (please see above for details)  Pt performed grooming and UB bathing w/ min A, UB dressing w/ mod A, LB bathing w/ mod A and LB dressing w/ total A  Pt performed sit to stand tranfsers x3 trials w/ mod A x2 for force production, steadying/balance, verbal cues for safe hand/ foot placement and technique and A for safety 2* dizziness and decreased BP  Pt required significantly increased time, rest breaks and verbal cues to perform and sequence ADL tasks 2* impaired attention and significant fatigue  Pt reporting "It is just so much effort" during UB bathing task  Pt performance and cognition is significantly improved from initial evaluation however pt remains significanly limited in all areas of occupation 2* above stated deficits  At this time continue to recommend inpatient rehab upon D/C  Will continue to follow and address previously stated goals  OT Discharge Recommendation: Short Term Rehab  OT - OK to Discharge:  Yes        Joesph Hugo MS, OTR/L No indicators present

## 2019-09-19 LAB
LEFT EYE DIABETIC RETINOPATHY: NORMAL
RIGHT EYE DIABETIC RETINOPATHY: NORMAL

## 2019-09-24 ENCOUNTER — OFFICE VISIT (OUTPATIENT)
Dept: FAMILY MEDICINE CLINIC | Facility: CLINIC | Age: 59
End: 2019-09-24
Payer: COMMERCIAL

## 2019-09-24 VITALS
SYSTOLIC BLOOD PRESSURE: 130 MMHG | DIASTOLIC BLOOD PRESSURE: 70 MMHG | WEIGHT: 277 LBS | HEIGHT: 71 IN | TEMPERATURE: 98.1 F | BODY MASS INDEX: 38.78 KG/M2

## 2019-09-24 DIAGNOSIS — E11.43 DIABETIC AUTONOMIC NEUROPATHY ASSOCIATED WITH TYPE 2 DIABETES MELLITUS (HCC): ICD-10-CM

## 2019-09-24 DIAGNOSIS — M79.89 LEG SWELLING: ICD-10-CM

## 2019-09-24 DIAGNOSIS — Z79.4 TYPE 2 DIABETES MELLITUS WITH DIABETIC NEUROPATHY, WITH LONG-TERM CURRENT USE OF INSULIN (HCC): Primary | ICD-10-CM

## 2019-09-24 DIAGNOSIS — E66.01 MORBID OBESITY (HCC): ICD-10-CM

## 2019-09-24 DIAGNOSIS — E11.40 TYPE 2 DIABETES MELLITUS WITH DIABETIC NEUROPATHY, WITH LONG-TERM CURRENT USE OF INSULIN (HCC): Primary | ICD-10-CM

## 2019-09-24 DIAGNOSIS — I25.10 CORONARY ARTERY DISEASE INVOLVING NATIVE CORONARY ARTERY OF NATIVE HEART WITHOUT ANGINA PECTORIS: ICD-10-CM

## 2019-09-24 DIAGNOSIS — I50.32 CHRONIC DIASTOLIC HEART FAILURE (HCC): ICD-10-CM

## 2019-09-24 DIAGNOSIS — E78.2 MIXED HYPERLIPIDEMIA: ICD-10-CM

## 2019-09-24 DIAGNOSIS — I10 BENIGN ESSENTIAL HTN: ICD-10-CM

## 2019-09-24 DIAGNOSIS — Z23 NEEDS FLU SHOT: Primary | ICD-10-CM

## 2019-09-24 LAB — SL AMB POCT GLUCOSE BLD: 227

## 2019-09-24 PROCEDURE — 99214 OFFICE O/P EST MOD 30 MIN: CPT | Performed by: FAMILY MEDICINE

## 2019-09-24 PROCEDURE — 90682 RIV4 VACC RECOMBINANT DNA IM: CPT | Performed by: FAMILY MEDICINE

## 2019-09-24 PROCEDURE — 3078F DIAST BP <80 MM HG: CPT | Performed by: FAMILY MEDICINE

## 2019-09-24 PROCEDURE — 82948 REAGENT STRIP/BLOOD GLUCOSE: CPT | Performed by: FAMILY MEDICINE

## 2019-09-24 PROCEDURE — 3075F SYST BP GE 130 - 139MM HG: CPT | Performed by: FAMILY MEDICINE

## 2019-09-24 PROCEDURE — 90471 IMMUNIZATION ADMIN: CPT | Performed by: FAMILY MEDICINE

## 2019-09-24 RX ORDER — FUROSEMIDE 40 MG/1
40 TABLET ORAL AS NEEDED
Qty: 30 TABLET | Refills: 5 | Status: CANCELLED | OUTPATIENT
Start: 2019-09-24

## 2019-09-24 RX ORDER — FUROSEMIDE 40 MG/1
40 TABLET ORAL AS NEEDED
Qty: 30 TABLET | Refills: 5 | Status: SHIPPED | OUTPATIENT
Start: 2019-09-24 | End: 2020-02-04

## 2019-09-24 NOTE — PROGRESS NOTES
Assessment/Plan:  Flu shot given at this time  Patient try to elevate leg  Patient will continue with current medications for chronic conditions and will add jardiance to regimen  Follow-up in 3 months  A1c in the office at that time       Diagnoses and all orders for this visit:    Type 2 diabetes mellitus with diabetic neuropathy, with long-term current use of insulin ([facility] )  -     Microalbumin / creatinine urine ratio  -     POCT blood glucose    Leg swelling    Diabetic autonomic neuropathy associated with type 2 diabetes mellitus (HCC)    Benign essential HTN    Coronary artery disease involving native coronary artery of native heart without angina pectoris    Chronic diastolic heart failure ([facility] )    Mixed hyperlipidemia    Morbid obesity ([facility] )    Other orders  -     Cancel: furosemide (LASIX) 40 mg tablet; Take 1 tablet (40 mg total) by mouth as needed (Edema)            Subjective:        Patient ID: Ángel Santos is a 60 y o  male  Patient follow-up on diabetes neuropathy hypertension hyperlipidemia CAD CHF  Patient does see ophthalmologist on a monthly basis for injections  Patient has noticed some lower extremity edema on the right  Urination is relatively unchanged  Bowels stable  No chest pain shortness of breath  Fasting blood sugars roughly 160  The following portions of the patient's history were reviewed and updated as appropriate: allergies, current medications, past family history, past medical history, past social history, past surgical history and problem list       Review of Systems   Constitutional: Negative  HENT: Negative  Eyes: Positive for visual disturbance  Respiratory: Negative  Cardiovascular: Positive for leg swelling  Gastrointestinal: Negative  Endocrine: Negative  Genitourinary: Negative  Musculoskeletal: Negative  Skin: Negative  Allergic/Immunologic: Negative  Neurological: Negative  Hematological: Negative  Psychiatric/Behavioral: Negative  Objective:      BMI Counseling: Body mass index is 38 91 kg/m²  Discussed the patient's BMI with him  The BMI is above normal  Nutrition recommendations include decreasing overall calorie intake  /70 (BP Location: Left arm, Patient Position: Sitting, Cuff Size: Adult)   Temp 98 1 °F (36 7 °C) (Tympanic)   Ht 5' 10 75" (1 797 m)   Wt 126 kg (277 lb)   BMI 38 91 kg/m²          Physical Exam   Constitutional: He appears well-developed and well-nourished  No distress  HENT:   Head: Normocephalic  Right Ear: External ear normal    Left Ear: External ear normal    Mouth/Throat: Oropharynx is clear and moist  No oropharyngeal exudate  Eyes: Pupils are equal, round, and reactive to light  EOM are normal  Right eye exhibits no discharge  Left eye exhibits no discharge  No scleral icterus  Neck: Normal range of motion  Neck supple  No thyromegaly present  Cardiovascular: Normal rate, regular rhythm, normal heart sounds and intact distal pulses  Exam reveals no gallop and no friction rub  No murmur heard  Pulmonary/Chest: Effort normal and breath sounds normal  No respiratory distress  He has no wheezes  He has no rales  He exhibits no tenderness  Abdominal: Soft  Bowel sounds are normal  He exhibits no distension  There is no tenderness  There is no rebound and no guarding  Musculoskeletal: Normal range of motion  He exhibits edema and deformity  He exhibits no tenderness  Edema right lower extremity 1/4   Lymphadenopathy:     He has no cervical adenopathy  Neurological: He is alert  No cranial nerve deficit  He exhibits normal muscle tone  Coordination normal    Skin: Skin is warm and dry  No rash noted  He is not diaphoretic  No erythema  No pallor  Psychiatric: He has a normal mood and affect  His behavior is normal  Judgment and thought content normal    Nursing note and vitals reviewed

## 2019-10-11 ENCOUNTER — OFFICE VISIT (OUTPATIENT)
Dept: URGENT CARE | Age: 59
End: 2019-10-11
Payer: COMMERCIAL

## 2019-10-11 VITALS
WEIGHT: 260 LBS | TEMPERATURE: 97.4 F | SYSTOLIC BLOOD PRESSURE: 145 MMHG | HEART RATE: 67 BPM | BODY MASS INDEX: 33.37 KG/M2 | HEIGHT: 74 IN | OXYGEN SATURATION: 95 % | DIASTOLIC BLOOD PRESSURE: 65 MMHG | RESPIRATION RATE: 20 BRPM

## 2019-10-11 DIAGNOSIS — S81.801A LEG WOUND, RIGHT, INITIAL ENCOUNTER: Primary | ICD-10-CM

## 2019-10-11 PROCEDURE — 99213 OFFICE O/P EST LOW 20 MIN: CPT | Performed by: PHYSICIAN ASSISTANT

## 2019-10-11 RX ORDER — SULFAMETHOXAZOLE AND TRIMETHOPRIM 400; 80 MG/1; MG/1
1 TABLET ORAL EVERY 12 HOURS SCHEDULED
Qty: 10 TABLET | Refills: 0 | Status: SHIPPED | OUTPATIENT
Start: 2019-10-11 | End: 2019-10-16

## 2019-10-11 RX ORDER — CEPHALEXIN 500 MG/1
500 CAPSULE ORAL EVERY 6 HOURS SCHEDULED
Qty: 28 CAPSULE | Refills: 0 | Status: SHIPPED | OUTPATIENT
Start: 2019-10-11 | End: 2019-10-18

## 2019-10-11 NOTE — PATIENT INSTRUCTIONS
Follow up with PCP in 3-5 days  Proceed to  ER if symptoms worsen  Patient will keep site clean as directed   Placed on Bactrim and cephalexin  Advised follow up with PCP for chronic swelling     Wound Infection   WHAT YOU NEED TO KNOW:   A wound infection occurs when bacteria enters a break in the skin  The infection may involve just the skin, or affect deeper tissues or organs close to the wound  DISCHARGE INSTRUCTIONS:   Return to the emergency department if:   · You feel short of breath  · Your heart is beating faster than usual      · You feel confused  · Blood soaks through your bandages  · Your wound comes apart or feels like it is ripping  · You have severe pain  · You see red streaks coming from the infected area  Contact your healthcare provider if:   · You have a fever or chills  · You have more pain, redness, or swelling near your wound  · Your symptoms do not improve  · The skin around your wound feels numb  · You have questions or concerns about your condition or care  Medicines: You may need any of the following:  · NSAIDs , such as ibuprofen, help decrease swelling, pain, and fever  This medicine is available with or without a doctor's order  NSAIDs can cause stomach bleeding or kidney problems in certain people  If you take blood thinner medicine, always ask your healthcare provider if NSAIDs are safe for you  Always read the medicine label and follow directions  · Antibiotics  help treat a bacterial infection  · Take your medicine as directed  Contact your healthcare provider if you think your medicine is not helping or if you have side effects  Tell him or her if you are allergic to any medicine  Keep a list of the medicines, vitamins, and herbs you take  Include the amounts, and when and why you take them  Bring the list or the pill bottles to follow-up visits  Carry your medicine list with you in case of an emergency    Care for your wound as directed:  Keep your wound clean and dry  You may need to cover your wound when you bathe so it does not get wet  Clean your wound as directed with soap and water or wound   Put on new, clean bandages as directed  Change your bandages when they get wet or dirty  Help your wound heal:   · Eat a variety of healthy foods  Examples include fruits, vegetables, whole-grain breads, low-fat dairy products, beans, lean meats, and fish  Healthy foods may help you heal faster  You may also need to take vitamins and minerals  Ask if you need to be on a special diet  · Manage other health conditions  Follow your healthcare provider's directions to manage health conditions that can cause slow wound healing  Examples include high blood pressure and diabetes  · Do not smoke  Nicotine and other chemicals in cigarettes and cigars can cause slow wound healing  Ask your healthcare provider for information if you currently smoke and need help to quit  E-cigarettes or smokeless tobacco still contain nicotine  Talk to your healthcare provider before you use these products  Follow up with your healthcare provider in 1 to 2 days:  Write down your questions so you remember to ask them during your visits  © 2017 [facility] Information is for End User's use only and may not be sold, redistributed or otherwise used for commercial purposes  All illustrations and images included in CareNotes® are the copyrighted property of [employer] , [employer]  or [employer]  The above information is an  only  It is not intended as medical advice for individual conditions or treatments  Talk to your doctor, nurse or pharmacist before following any medical regimen to see if it is safe and effective for you

## 2019-10-11 NOTE — PROGRESS NOTES
[facility]        NAME: Anaid Pretty is a 60 y o  male  : 1960    MRN: [id number]  DATE: 2019  TIME: 12:15 PM    Assessment and Plan   Leg wound, right, initial encounter [S81 801A]  1  Leg wound, right, initial encounter  sulfamethoxazole-trimethoprim (BACTRIM) 400-80 mg per tablet    cephalexin (KEFLEX) 500 mg capsule         Patient Instructions     Follow up with PCP in 3-5 days  Proceed to  ER if symptoms worsen  Patient will keep site clean as directed   Placed on Bactrim and cephalexin  Advised follow up with PCP for chronic swelling     Chief Complaint     Chief Complaint   Patient presents with    Leg Swelling     Pt reports wearing a compression stocking for about 3 days straight and last night took it off and noticed his right lower leg was red, swollen, and some open wound on top of right ankle  Pt denies pain but has neuropathy in his leg  Denies fever  Pt states he is up to date on his Tetanus (not recorded in EPIC chart)  History of Present Illness       Patient states that he has been wearing compression stockings for swelling in the leg for the past few weeks  Patient states that he was wearing the compression stocking since Monday  Patient states that the stocking slipped without him knowing and ended up by his ankle where it remained for a few days  Patient has a small wound on the site  Paitent denies any numbness or tingling in the leg  Patient has a known case of DM and edema In the right leg  Patient offers no other complaints at this time,     Leg Pain          Review of Systems   Review of Systems   Constitutional: Negative  HENT: Negative  Eyes: Negative  Respiratory: Negative  Cardiovascular: Negative  Gastrointestinal: Negative  Endocrine: Negative  Genitourinary: Negative  Musculoskeletal: Negative  Skin: Positive for color change, rash and wound  Negative for pallor  Allergic/Immunologic: Negative      Neurological: Negative  Hematological: Negative  Psychiatric/Behavioral: Negative            Current Medications       Current Outpatient Medications:     atorvastatin (LIPITOR) 80 mg tablet, Take 80 mg by mouth daily, Disp: , Rfl:     cephalexin (KEFLEX) 500 mg capsule, Take 1 capsule (500 mg total) by mouth every 6 (six) hours for 7 days, Disp: 28 capsule, Rfl: 0    clopidogrel (PLAVIX) 75 mg tablet, Take 1 tablet (75 mg total) by mouth daily, Disp: 30 tablet, Rfl: 5    docusate sodium (COLACE) 100 mg capsule, Take 1 capsule (100 mg total) by mouth 2 (two) times a day, Disp: 60 capsule, Rfl: 5    Empagliflozin (JARDIANCE) 10 MG TABS, Take 1 tablet (10 mg total) by mouth every morning, Disp: 90 tablet, Rfl: 1    ferrous sulfate 325 (65 Fe) mg tablet, Take 1 tablet (325 mg total) by mouth daily with breakfast, Disp: 30 tablet, Rfl: 5    finasteride (PROSCAR) 5 mg tablet, Take 1 tablet (5 mg total) by mouth daily, Disp: 30 tablet, Rfl: 5    furosemide (LASIX) 40 mg tablet, Take 1 tablet (40 mg total) by mouth as needed (Edema), Disp: 30 tablet, Rfl: 5    glucose blood test strip, ReliOn Prime Meter, Disp: , Rfl:     insulin glargine (LANTUS) 100 units/mL subcutaneous injection, Inject 40 Units under the skin 2 (two) times a day, Disp: 1200 Units, Rfl: 1    LANTUS SOLOSTAR 100 units/mL injection pen, INJECT 80 UNITS TOTAL UNDER THE SKIN TWICE A DAY (Patient taking differently: 40 Units 2 (two) times a day ), Disp: 135 mL, Rfl: 2    metoclopramide (REGLAN) 5 mg tablet, Take 1 tablet (5 mg total) by mouth daily (Patient not taking: Reported on 9/24/2019), Disp: 30 tablet, Rfl: 5    midodrine (PROAMATINE) 5 mg tablet, Take 5 mg by mouth as needed , Disp: , Rfl:     pantoprazole (PROTONIX) 40 mg tablet, Take 1 tablet (40 mg total) by mouth daily in the early morning for 194 days, Disp: 30 tablet, Rfl: 5    sulfamethoxazole-trimethoprim (BACTRIM) 400-80 mg per tablet, Take 1 tablet by mouth every 12 (twelve) hours for 5 days, Disp: 10 tablet, Rfl: 0    tamsulosin (FLOMAX) 0 4 mg, Take 1 capsule (0 4 mg total) by mouth daily with dinner, Disp: 60 capsule, Rfl: 5    Current Allergies     Allergies as of 10/11/2019 - Reviewed 10/11/2019   Allergen Reaction Noted    Metformin  12/10/2014            The following portions of the patient's history were reviewed and updated as appropriate: allergies, current medications, past family history, past medical history, past social history, past surgical history and problem list      Past Medical History:   Diagnosis Date    Anemia     Anxiety and depression     Autonomic neuropathy     Cataract     Coronary artery disease     [facility]     Diabetic autonomic neuropathy associated with type 2 diabetes mellitus ([facility] )     Last Assessed: 12/28/2017    Gastroparesis due to DM (HCC)     History of DVT (deep vein thrombosis)     left LE    HTN (hypertension)     Hyperlipidemia     Non healing left heel wound     Obesity     Orthostatic hypotension        Past Surgical History:   Procedure Laterality Date    LEG AMPUTATION THROUGH LOWER TIBIA AND FIBULA Left 8/3/2018    Procedure: AMPUTATION BELOW KNEE (BKA) L BKA;  Surgeon: Fidel Larsen MD;  Location: [facility];  Service: Vascular    AR CABG, ARTERY-VEIN, FOUR N/A 3/28/2018    Procedure: CORONARY ARTERY BYPASS GRAFT (CABG) x3 VESSELS, LIMA TO LAD, SVG--> PDA, SVG--> OM2,  RIGHT LEG EVH/SVH TO , INTRA-OP AMANDEEP;  Surgeon: Bran Rider MD;  Location: [facility];  Service: Cardiac Surgery    ROTATOR CUFF REPAIR Bilateral        Family History   Problem Relation Age of Onset    Atrial fibrillation Mother     Stroke Mother     Hypertension Father     Heart attack Paternal Grandfather 61         Medications have been verified          Objective   /65   Pulse 67   Temp (!) 97 4 °F (36 3 °C)   Resp 20   Ht 6' 2" (1 88 m)   Wt 118 kg (260 lb)   SpO2 95%   BMI 33 38 kg/m²        Physical Exam Physical Exam   Constitutional: He is oriented to person, place, and time  He appears well-developed and well-nourished  HENT:   Head: Normocephalic  Eyes: Pupils are equal, round, and reactive to light  Conjunctivae and EOM are normal    Neck: Normal range of motion  Cardiovascular: Normal rate, regular rhythm, normal heart sounds and intact distal pulses  Pulmonary/Chest: Effort normal and breath sounds normal    Musculoskeletal:        Legs:  Neurological: He is alert and oriented to person, place, and time  Skin: Skin is warm  Capillary refill takes less than 2 seconds  Psychiatric: He has a normal mood and affect  His behavior is normal  Judgment and thought content normal    Nursing note and vitals reviewed

## 2019-10-23 ENCOUNTER — OFFICE VISIT (OUTPATIENT)
Dept: PODIATRY | Facility: CLINIC | Age: 59
End: 2019-10-23
Payer: COMMERCIAL

## 2019-10-23 VITALS
HEIGHT: 74 IN | BODY MASS INDEX: 33.62 KG/M2 | DIASTOLIC BLOOD PRESSURE: 86 MMHG | WEIGHT: 262 LBS | HEART RATE: 80 BPM | SYSTOLIC BLOOD PRESSURE: 140 MMHG

## 2019-10-23 DIAGNOSIS — S88.112A AMPUTATED BELOW KNEE, LEFT (HCC): ICD-10-CM

## 2019-10-23 DIAGNOSIS — E11.40 TYPE 2 DIABETES MELLITUS WITH DIABETIC NEUROPATHY, WITH LONG-TERM CURRENT USE OF INSULIN (HCC): ICD-10-CM

## 2019-10-23 DIAGNOSIS — E66.01 MORBID OBESITY (HCC): ICD-10-CM

## 2019-10-23 DIAGNOSIS — L60.0 INGROWN RIGHT BIG TOENAIL: ICD-10-CM

## 2019-10-23 DIAGNOSIS — S81.801A OPEN WOUND OF RIGHT LOWER LEG, INITIAL ENCOUNTER: ICD-10-CM

## 2019-10-23 DIAGNOSIS — M79.89 LEG SWELLING: ICD-10-CM

## 2019-10-23 DIAGNOSIS — Z79.4 TYPE 2 DIABETES MELLITUS WITH DIABETIC NEUROPATHY, WITH LONG-TERM CURRENT USE OF INSULIN (HCC): ICD-10-CM

## 2019-10-23 PROCEDURE — 99213 OFFICE O/P EST LOW 20 MIN: CPT | Performed by: PODIATRIST

## 2019-10-23 PROCEDURE — 11730 AVULSION NAIL PLATE SIMPLE 1: CPT | Performed by: PODIATRIST

## 2019-10-23 NOTE — PROGRESS NOTES
Assessment/Plan:     Diagnoses and all orders for this visit:    Type 2 diabetes mellitus with diabetic neuropathy, with long-term current use of insulin ([facility] [facility] )  -     Diabetic Shoe    Morbid obesity ([facility] )    Leg swelling  -     Diabetic Shoe    Amputated below knee, left ([address] )  -     Diabetic Shoe    Ingrown right big toenail  -     Ambulatory referral to Wound Care; Future  -     Nail removal    Open wound of right lower leg, initial encounter  -     Ambulatory referral to Wound Care; Future        1  Patient has wound to his right lower leg secondary to a constriction from compression stocking  This does not appear deep but given the amount of fluid in his leg will be slow to heal as there is excessive serous drainage from around the calf  I have referred the patient to the [address] [place] [address] Road as he needs specialized compression wrappings that cannot cause a tourniquet like the once he used  In the meantime he is to clean the abrasion daily with warm soap and water wash cloth and dressed with dry gauze  He finished a course of antibiotic and I do not see any sign of infection today to restart the antibiotic  2  Patient was unaware he had a moderate to severe ingrown nail of his right great toe  He does not come to routine at-risk foot visits  He is at extremely high risk of complications given that he lost his other leg  The nail was removed cleanly, see procedure below  Postop instructions given  3    I am very concerned with the patient's overall health  Patient's wife stated that he sits in a chair all day and never stance  Patient gets out of breath after taking 3 or 4 steps  He has had triple bypass and heart attack over the last few years  He clearly needs more extensive cardiac rehab but states he does not want to go to therapy anymore  He does not come to his routine podiatric visits    This will need to be an ongoing discussion with patient and his wife as she is very concerned about his overall health  4    Diabetic shoes prescribed a custom molded inserts  Nail removal  Date/Time: 10/23/2019 3:40 PM  Performed by: Perla Mitchell DPM  Authorized by: Perla Mitchell DPM     Patient location:  Clinic  Consent:     Consent obtained:  Verbal    Consent given by:  Patient    Risks discussed:  Bleeding, incomplete removal, infection, pain and permanent nail deformity  Universal protocol:     Patient identity confirmed:  Verbally with patient  Location:     Foot:  R big toe  Pre-procedure details:     Skin preparation:  Betadine  Anesthesia (see MAR for exact dosages): Anesthesia method:  None  Nail Removal:     Nail removed:  Partial    Nail side:  Lateral  Ingrown nail:     Nail matrix removed or ablated:  None  Post-procedure details:     Dressing:  4x4 sterile gauze, antibiotic ointment and gauze roll    Patient tolerance of procedure: Tolerated well, no immediate complications        Subjective:      Patient ID: Autumn Reyes is a 60 y o  male  Patient has PMH significant for DM2, 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, swelling of limbs, and neuropathy  HE started getting right leg swelling 2 months ago  He was checked for blood clots which were negative  There were no wounds  His PCP prescribed him compression stockings and a water pill  About 2 weeks ago the compression stocking fell down his leg and bunched around the ankle  He developed a wound where it was tight  Urgent care prescribed bactrim and keflex  The redness went away  He feels well  He has been dressing with clean dry bandage  His A1C in June was 9 5  The following portions of the patient's history were reviewed and updated as appropriate: allergies, current medications, past family history, past medical history, past social history, past surgical history and problem list     Review of Systems   Constitutional: Negative for activity change, chills and fever  Respiratory: Negative for cough      Cardiovascular: Negative for leg swelling  Gastrointestinal: Negative for diarrhea and nausea  Musculoskeletal: Positive for arthralgias, gait problem and joint swelling  Skin: Positive for wound  Neurological: Positive for numbness  Objective:      /86   Pulse 80   Ht 6' 2" (1 88 m)   Wt 119 kg (262 lb)   BMI 33 64 kg/m²          Physical Exam   Constitutional: He appears well-developed  He appears ill  No distress  Cardiovascular: Pulses are no weak pulses  Pulses:       Dorsalis pedis pulses are 2+ on the right side  Posterior tibial pulses are 2+ on the right side  Pulmonary/Chest: Tachypnea ( patient becomes out of breath with minimal amount of ambulation using a rolling walker) noted  No respiratory distress  Abdominal: Soft  There is no tenderness  Musculoskeletal:        Feet:    Feet:   Right Foot:   Skin Integrity: Positive for ulcer (  Circumferential partial-thickness ulceration around the upper ankle of the right lower extremity  Mild serous drainage  No cellulitis or pus )  Negative for erythema  Left Foot: amputated  Lymphadenopathy:        Right: No inguinal adenopathy present  No popliteal lymph nodes palpated  No popliteal tenderness   Skin: He is not diaphoretic  Psychiatric: His mood appears not anxious  He is not agitated  He exhibits a depressed mood  He expresses no suicidal ideation  Vitals reviewed  Diabetic Foot Exam    Patient's shoes and socks removed  Right Foot/Ankle   Right Foot Inspection  Skin Exam: abnormal color ( there is a paronychia to the right lateral nail fold without significant drainage but there is redness and swelling), pre-ulcer ( blister on the lateral aspect of the right foot around the 5th metatarsal intact  No sign of infection ) and ulcer (  Circumferential partial-thickness ulceration around the upper ankle of the right lower extremity  Mild serous drainage    No cellulitis or pus ) skin not intact and no erythema Toe Exam: swelling ( +2 pitting edema)no tenderness and erythema  Sensory   Vibration: absent  Proprioception: absent   Monofilament testing: absent  Vascular  Capillary refills: < 3 seconds  The right DP pulse is 2+  The right PT pulse is 2+  Left Foot/Ankle  Left Foot Inspection                         Amputation: amputation left foot (Comments: bka)                      Assign Risk Category:  Deformity present; Loss of protective sensation;  No weak pulses       Risk: 3

## 2019-10-23 NOTE — PATIENT INSTRUCTIONS
Partial Nail Avulsion for Ingrown Nail   AMBULATORY CARE:   What you need to know about a partial nail avulsion:  A partial nail avulsion is a procedure to remove an ingrown nail  An ingrown nail is when the edge of your fingernail or toenail grows into the skin next to it  How to prepare for a partial nail avulsion:  Your healthcare provider will talk to you about how to prepare for the procedure  He may tell you not to eat or drink anything after midnight on the day of your surgery  He will tell you what medicines to take or not take on the day of your surgery  You may need someone to drive you home and stay with you  What will happen during a partial nail avulsion:   · You will be given local anesthesia around the nail to numb the area  Your healthcare provider will cut the nail along the edge that is growing into the skin  He will remove the nail from your finger or toe  Your healthcare provider may apply a solution or small electric charge to your nail bed  This keeps the nail from growing into your skin again  · You may need a matricectomy  This is when part of your nail matrix is destroyed so a small section of your nail stops growing  Your nail matrix is the area that your nail grows from  It is the pale or white color at the base of your nail  Most of the matrix cannot be seen because it is underneath your skin  A chemical, laser, or instrument may be used to destroy the nail matrix  What will happen after a partial nail avulsion:  Your healthcare provider may put antibiotic ointment and a bandage on your finger or toe  He may want to look at your finger or toe again within 24 hours after your procedure  You may have yellowish drainage for 2 to 6 weeks after your procedure  Risks of a partial nail avulsion:  You may bleed more than expected or develop an infection  Your ingrown nail may happen again  You may have damage to surrounding tissue  Your nail may look disfigured or you may have a scar  You may develop a cyst or it make take longer than expected to heal   Seek care immediately if:   · Blood soaks through your bandage  · You have a red streak running up your leg or arm  Contact your healthcare provider if:   · You have a fever or chills  · Your wound is red, swollen, or draining pus  · Your symptoms return  · The nail is not better in 7 days  · You have questions or concerns about your condition or care  Medicines:   · Acetaminophen  decreases pain and fever  It is available without a doctor's order  Ask how much to take and how often to take it  Follow directions  Acetaminophen can cause liver damage if not taken correctly  · NSAIDs , such as ibuprofen, help decrease swelling, pain, and fever  This medicine is available with or without a doctor's order  NSAIDs can cause stomach bleeding or kidney problems in certain people  If you take blood thinner medicine, always ask your healthcare provider if NSAIDs are safe for you  Always read the medicine label and follow directions  · Take your medicine as directed  Contact your healthcare provider if you think your medicine is not helping or if you have side effects  Tell him or her if you are allergic to any medicine  Keep a list of the medicines, vitamins, and herbs you take  Include the amounts, and when and why you take them  Bring the list or the pill bottles to follow-up visits  Carry your medicine list with you in case of an emergency  Self-care:   · Soak your wound in warm water  2 times a day  Look for signs of infection such as redness, swelling, or drainage  It may take up to 4 weeks to heal     · Keep your nail area clean, dry, and covered  Put on a clean, new bandage as directed  Do not use an adhesive bandage  It may stick to the wound and cause pain when you remove it  Ask your healthcare provider what kind of bandage to use  Change your bandage when it gets wet or dirty       · Wear tennis shoes  or shoes that fit loosely for the first 2 weeks  Do not wear tight shoes or shoes with high heels  Do not run or do strenuous activity for 2 to 4 weeks  · Elevate  your hand or foot above the level of your heart as often as you can for 24 hours  This will help decrease swelling and pain  Prop your hand or foot on pillows or blankets to keep it elevated comfortably  · Ask  when you can return to work, school, or your usual sports and activities  Prevent another ingrown nail:   · Trim your nails straight across  Do not cut them too short  Do not tear your nails at the corners or pick at them  Lightly file the nail corners if you have sharp edges  Do not round your nails  Use clippers, not nail scissors  · Keep your nails clean and dry  Wash your hands and feet with soap and water  Pat dry with a clean towel  Dry in between each toe  Do not put lotion between your toes  · Inspect your nails daily  Look for signs of an ingrown nail  Manage problems early so the nail does not become infected  Follow up with your healthcare provider as directed:  Write down your questions so you remember to ask them during your visits  © 2017 [employer] Information is for End User's use only and may not be sold, redistributed or otherwise used for commercial purposes  All illustrations and images included in CareNotes® are the copyrighted property of [employer]  or [employer]  The above information is an  only  It is not intended as medical advice for individual conditions or treatments  Talk to your doctor, nurse or pharmacist before following any medical regimen to see if it is safe and effective for you

## 2019-12-23 DIAGNOSIS — Z95.1 S/P CABG (CORONARY ARTERY BYPASS GRAFT): ICD-10-CM

## 2019-12-23 DIAGNOSIS — Z89.512 S/P BKA (BELOW KNEE AMPUTATION), LEFT (HCC): ICD-10-CM

## 2019-12-23 DIAGNOSIS — I50.22 CHRONIC SYSTOLIC CONGESTIVE HEART FAILURE (HCC): ICD-10-CM

## 2019-12-23 DIAGNOSIS — R33.9 URINARY RETENTION: ICD-10-CM

## 2019-12-23 RX ORDER — CLOPIDOGREL BISULFATE 75 MG/1
75 TABLET ORAL DAILY
Qty: 30 TABLET | Refills: 5 | Status: SHIPPED | OUTPATIENT
Start: 2019-12-23 | End: 2020-06-29 | Stop reason: SDUPTHER

## 2019-12-23 RX ORDER — TAMSULOSIN HYDROCHLORIDE 0.4 MG/1
0.4 CAPSULE ORAL
Qty: 30 CAPSULE | Refills: 5 | Status: SHIPPED | OUTPATIENT
Start: 2019-12-23 | End: 2020-06-29 | Stop reason: SDUPTHER

## 2019-12-23 RX ORDER — METOCLOPRAMIDE 5 MG/1
5 TABLET ORAL DAILY
Qty: 30 TABLET | Refills: 5 | Status: SHIPPED | OUTPATIENT
Start: 2019-12-23 | End: 2020-06-29 | Stop reason: SDUPTHER

## 2019-12-23 RX ORDER — PANTOPRAZOLE SODIUM 40 MG/1
40 TABLET, DELAYED RELEASE ORAL
Qty: 30 TABLET | Refills: 5 | Status: SHIPPED | OUTPATIENT
Start: 2019-12-23 | End: 2020-06-29 | Stop reason: SDUPTHER

## 2019-12-23 RX ORDER — FERROUS SULFATE 325(65) MG
325 TABLET ORAL
Qty: 30 TABLET | Refills: 5 | Status: SHIPPED | OUTPATIENT
Start: 2019-12-23 | End: 2020-06-29 | Stop reason: SDUPTHER

## 2019-12-23 RX ORDER — FINASTERIDE 5 MG/1
5 TABLET, FILM COATED ORAL DAILY
Qty: 30 TABLET | Refills: 5 | Status: SHIPPED | OUTPATIENT
Start: 2019-12-23 | End: 2020-06-29 | Stop reason: SDUPTHER

## 2019-12-23 NOTE — TELEPHONE ENCOUNTER
Patient left message on refill line for refill of six medications  Patient was seen by Dr Staci Finney on 9/24/19  Have placed orders for approval  Thank you

## 2020-02-04 ENCOUNTER — APPOINTMENT (OUTPATIENT)
Dept: CT IMAGING | Facility: HOSPITAL | Age: 60
DRG: 292 | End: 2020-02-04
Payer: COMMERCIAL

## 2020-02-04 ENCOUNTER — APPOINTMENT (EMERGENCY)
Dept: RADIOLOGY | Facility: HOSPITAL | Age: 60
DRG: 292 | End: 2020-02-04
Payer: COMMERCIAL

## 2020-02-04 ENCOUNTER — HOSPITAL ENCOUNTER (INPATIENT)
Facility: HOSPITAL | Age: 60
LOS: 6 days | DRG: 292 | End: 2020-02-11
Attending: EMERGENCY MEDICINE | Admitting: INTERNAL MEDICINE
Payer: COMMERCIAL

## 2020-02-04 ENCOUNTER — OFFICE VISIT (OUTPATIENT)
Dept: FAMILY MEDICINE CLINIC | Facility: CLINIC | Age: 60
End: 2020-02-04
Payer: COMMERCIAL

## 2020-02-04 VITALS
SYSTOLIC BLOOD PRESSURE: 148 MMHG | HEART RATE: 74 BPM | RESPIRATION RATE: 18 BRPM | WEIGHT: 272 LBS | DIASTOLIC BLOOD PRESSURE: 72 MMHG | BODY MASS INDEX: 34.92 KG/M2

## 2020-02-04 DIAGNOSIS — I25.10 TRIPLE VESSEL CORONARY ARTERY DISEASE: ICD-10-CM

## 2020-02-04 DIAGNOSIS — S91.302A NON-HEALING WOUND OF LEFT HEEL: ICD-10-CM

## 2020-02-04 DIAGNOSIS — Z95.1 S/P CABG X 3: ICD-10-CM

## 2020-02-04 DIAGNOSIS — R06.02 SOB (SHORTNESS OF BREATH): Primary | ICD-10-CM

## 2020-02-04 DIAGNOSIS — IMO0002 UNCONTROLLED DIABETES MELLITUS TYPE 2 WITH ATHEROSCLEROSIS OF ARTERIES OF EXTREMITIES: Primary | ICD-10-CM

## 2020-02-04 DIAGNOSIS — R06.00 DYSPNEA ON EXERTION: Primary | ICD-10-CM

## 2020-02-04 DIAGNOSIS — S88.112A AMPUTATED BELOW KNEE, LEFT (HCC): ICD-10-CM

## 2020-02-04 DIAGNOSIS — I50.32 CHRONIC DIASTOLIC HEART FAILURE (HCC): ICD-10-CM

## 2020-02-04 DIAGNOSIS — I73.9 PAD (PERIPHERAL ARTERY DISEASE) (HCC): ICD-10-CM

## 2020-02-04 DIAGNOSIS — R21 RASH: ICD-10-CM

## 2020-02-04 DIAGNOSIS — E66.01 CLASS 2 SEVERE OBESITY DUE TO EXCESS CALORIES WITH SERIOUS COMORBIDITY AND BODY MASS INDEX (BMI) OF 35.0 TO 35.9 IN ADULT (HCC): ICD-10-CM

## 2020-02-04 DIAGNOSIS — M79.89 LEG SWELLING: ICD-10-CM

## 2020-02-04 DIAGNOSIS — R77.8 ELEVATED TROPONIN: ICD-10-CM

## 2020-02-04 DIAGNOSIS — J18.9 HEALTHCARE-ASSOCIATED PNEUMONIA: ICD-10-CM

## 2020-02-04 PROBLEM — IMO0001 INSULIN DEPENDENT DIABETES MELLITUS: Status: ACTIVE | Noted: 2020-02-04

## 2020-02-04 PROBLEM — R06.09 DOE (DYSPNEA ON EXERTION): Status: ACTIVE | Noted: 2020-02-04

## 2020-02-04 PROBLEM — E66.9 OBESITY (BMI 35.0-39.9 WITHOUT COMORBIDITY): Status: ACTIVE | Noted: 2018-03-19

## 2020-02-04 LAB
ALBUMIN SERPL BCP-MCNC: 3 G/DL (ref 3.5–5)
ALP SERPL-CCNC: 58 U/L (ref 46–116)
ALT SERPL W P-5'-P-CCNC: 33 U/L (ref 12–78)
ANION GAP SERPL CALCULATED.3IONS-SCNC: 10 MMOL/L (ref 4–13)
APTT PPP: 26 SECONDS (ref 23–37)
AST SERPL W P-5'-P-CCNC: 25 U/L (ref 5–45)
BASOPHILS # BLD AUTO: 0.06 THOUSANDS/ΜL (ref 0–0.1)
BASOPHILS NFR BLD AUTO: 1 % (ref 0–1)
BILIRUB SERPL-MCNC: 0.27 MG/DL (ref 0.2–1)
BUN SERPL-MCNC: 16 MG/DL (ref 5–25)
CALCIUM SERPL-MCNC: 8.8 MG/DL (ref 8.3–10.1)
CHLORIDE SERPL-SCNC: 102 MMOL/L (ref 100–108)
CO2 SERPL-SCNC: 27 MMOL/L (ref 21–32)
CREAT SERPL-MCNC: 1.21 MG/DL (ref 0.6–1.3)
EOSINOPHIL # BLD AUTO: 0.2 THOUSAND/ΜL (ref 0–0.61)
EOSINOPHIL NFR BLD AUTO: 2 % (ref 0–6)
ERYTHROCYTE [DISTWIDTH] IN BLOOD BY AUTOMATED COUNT: 13.9 % (ref 11.6–15.1)
GFR SERPL CREATININE-BSD FRML MDRD: 65 ML/MIN/1.73SQ M
GLUCOSE SERPL-MCNC: 159 MG/DL (ref 65–140)
GLUCOSE SERPL-MCNC: 164 MG/DL (ref 65–140)
GLUCOSE SERPL-MCNC: 89 MG/DL (ref 65–140)
HCT VFR BLD AUTO: 42.7 % (ref 36.5–49.3)
HGB BLD-MCNC: 13.5 G/DL (ref 12–17)
IMM GRANULOCYTES # BLD AUTO: 0.02 THOUSAND/UL (ref 0–0.2)
IMM GRANULOCYTES NFR BLD AUTO: 0 % (ref 0–2)
INR PPP: 1.01 (ref 0.84–1.19)
LYMPHOCYTES # BLD AUTO: 1.81 THOUSANDS/ΜL (ref 0.6–4.47)
LYMPHOCYTES NFR BLD AUTO: 21 % (ref 14–44)
MCH RBC QN AUTO: 27.6 PG (ref 26.8–34.3)
MCHC RBC AUTO-ENTMCNC: 31.6 G/DL (ref 31.4–37.4)
MCV RBC AUTO: 87 FL (ref 82–98)
MONOCYTES # BLD AUTO: 0.55 THOUSAND/ΜL (ref 0.17–1.22)
MONOCYTES NFR BLD AUTO: 6 % (ref 4–12)
NEUTROPHILS # BLD AUTO: 5.91 THOUSANDS/ΜL (ref 1.85–7.62)
NEUTS SEG NFR BLD AUTO: 70 % (ref 43–75)
NRBC BLD AUTO-RTO: 0 /100 WBCS
NT-PROBNP SERPL-MCNC: 366 PG/ML
PLATELET # BLD AUTO: 225 THOUSANDS/UL (ref 149–390)
PLATELET # BLD AUTO: 239 THOUSANDS/UL (ref 149–390)
PMV BLD AUTO: 9.5 FL (ref 8.9–12.7)
PMV BLD AUTO: 9.6 FL (ref 8.9–12.7)
POTASSIUM SERPL-SCNC: 4.1 MMOL/L (ref 3.5–5.3)
PROT SERPL-MCNC: 8 G/DL (ref 6.4–8.2)
PROTHROMBIN TIME: 13.4 SECONDS (ref 11.6–14.5)
RBC # BLD AUTO: 4.89 MILLION/UL (ref 3.88–5.62)
SODIUM SERPL-SCNC: 139 MMOL/L (ref 136–145)
TROPONIN I SERPL-MCNC: <0.02 NG/ML
TROPONIN I SERPL-MCNC: <0.02 NG/ML
WBC # BLD AUTO: 8.55 THOUSAND/UL (ref 4.31–10.16)

## 2020-02-04 PROCEDURE — 99214 OFFICE O/P EST MOD 30 MIN: CPT | Performed by: FAMILY MEDICINE

## 2020-02-04 PROCEDURE — 85730 THROMBOPLASTIN TIME PARTIAL: CPT

## 2020-02-04 PROCEDURE — 99220 PR INITIAL OBSERVATION CARE/DAY 70 MINUTES: CPT | Performed by: HOSPITALIST

## 2020-02-04 PROCEDURE — 83880 ASSAY OF NATRIURETIC PEPTIDE: CPT

## 2020-02-04 PROCEDURE — 83036 HEMOGLOBIN GLYCOSYLATED A1C: CPT | Performed by: PHYSICIAN ASSISTANT

## 2020-02-04 PROCEDURE — 99285 EMERGENCY DEPT VISIT HI MDM: CPT

## 2020-02-04 PROCEDURE — 82948 REAGENT STRIP/BLOOD GLUCOSE: CPT

## 2020-02-04 PROCEDURE — 71046 X-RAY EXAM CHEST 2 VIEWS: CPT

## 2020-02-04 PROCEDURE — 85049 AUTOMATED PLATELET COUNT: CPT | Performed by: PHYSICIAN ASSISTANT

## 2020-02-04 PROCEDURE — 93005 ELECTROCARDIOGRAM TRACING: CPT

## 2020-02-04 PROCEDURE — 84484 ASSAY OF TROPONIN QUANT: CPT | Performed by: EMERGENCY MEDICINE

## 2020-02-04 PROCEDURE — 3077F SYST BP >= 140 MM HG: CPT | Performed by: FAMILY MEDICINE

## 2020-02-04 PROCEDURE — 1036F TOBACCO NON-USER: CPT | Performed by: FAMILY MEDICINE

## 2020-02-04 PROCEDURE — 3078F DIAST BP <80 MM HG: CPT | Performed by: FAMILY MEDICINE

## 2020-02-04 PROCEDURE — 36415 COLL VENOUS BLD VENIPUNCTURE: CPT | Performed by: EMERGENCY MEDICINE

## 2020-02-04 PROCEDURE — 85610 PROTHROMBIN TIME: CPT

## 2020-02-04 PROCEDURE — 80053 COMPREHEN METABOLIC PANEL: CPT | Performed by: EMERGENCY MEDICINE

## 2020-02-04 PROCEDURE — 99285 EMERGENCY DEPT VISIT HI MDM: CPT | Performed by: EMERGENCY MEDICINE

## 2020-02-04 PROCEDURE — 71275 CT ANGIOGRAPHY CHEST: CPT

## 2020-02-04 PROCEDURE — 84484 ASSAY OF TROPONIN QUANT: CPT | Performed by: PHYSICIAN ASSISTANT

## 2020-02-04 PROCEDURE — 85025 COMPLETE CBC W/AUTO DIFF WBC: CPT | Performed by: EMERGENCY MEDICINE

## 2020-02-04 RX ORDER — HEPARIN SODIUM 5000 [USP'U]/ML
5000 INJECTION, SOLUTION INTRAVENOUS; SUBCUTANEOUS EVERY 8 HOURS SCHEDULED
Status: DISCONTINUED | OUTPATIENT
Start: 2020-02-04 | End: 2020-02-11 | Stop reason: HOSPADM

## 2020-02-04 RX ORDER — TAMSULOSIN HYDROCHLORIDE 0.4 MG/1
0.4 CAPSULE ORAL
Status: DISCONTINUED | OUTPATIENT
Start: 2020-02-05 | End: 2020-02-11 | Stop reason: HOSPADM

## 2020-02-04 RX ORDER — CLOPIDOGREL BISULFATE 75 MG/1
75 TABLET ORAL DAILY
Status: DISCONTINUED | OUTPATIENT
Start: 2020-02-05 | End: 2020-02-11 | Stop reason: HOSPADM

## 2020-02-04 RX ORDER — METOCLOPRAMIDE 10 MG/1
5 TABLET ORAL DAILY
Status: DISCONTINUED | OUTPATIENT
Start: 2020-02-05 | End: 2020-02-11 | Stop reason: HOSPADM

## 2020-02-04 RX ORDER — ATORVASTATIN CALCIUM 80 MG/1
80 TABLET, FILM COATED ORAL
Status: DISCONTINUED | OUTPATIENT
Start: 2020-02-05 | End: 2020-02-11 | Stop reason: HOSPADM

## 2020-02-04 RX ORDER — FUROSEMIDE 40 MG/1
40 TABLET ORAL
Status: DISCONTINUED | OUTPATIENT
Start: 2020-02-05 | End: 2020-02-06

## 2020-02-04 RX ORDER — FUROSEMIDE 40 MG/1
40 TABLET ORAL 2 TIMES DAILY
COMMUNITY
End: 2020-04-14 | Stop reason: SDUPTHER

## 2020-02-04 RX ORDER — PANTOPRAZOLE SODIUM 40 MG/1
40 TABLET, DELAYED RELEASE ORAL
Status: DISCONTINUED | OUTPATIENT
Start: 2020-02-05 | End: 2020-02-11 | Stop reason: HOSPADM

## 2020-02-04 RX ORDER — ASPIRIN 325 MG
325 TABLET ORAL ONCE
Status: COMPLETED | OUTPATIENT
Start: 2020-02-04 | End: 2020-02-05

## 2020-02-04 RX ORDER — ONDANSETRON 2 MG/ML
4 INJECTION INTRAMUSCULAR; INTRAVENOUS EVERY 6 HOURS PRN
Status: DISCONTINUED | OUTPATIENT
Start: 2020-02-04 | End: 2020-02-11 | Stop reason: HOSPADM

## 2020-02-04 RX ORDER — INSULIN GLARGINE 100 [IU]/ML
40 INJECTION, SOLUTION SUBCUTANEOUS 2 TIMES DAILY
Status: DISCONTINUED | OUTPATIENT
Start: 2020-02-04 | End: 2020-02-06

## 2020-02-04 RX ORDER — FINASTERIDE 5 MG/1
5 TABLET, FILM COATED ORAL DAILY
Status: DISCONTINUED | OUTPATIENT
Start: 2020-02-05 | End: 2020-02-11 | Stop reason: HOSPADM

## 2020-02-04 RX ADMIN — IOHEXOL 68 ML: 350 INJECTION, SOLUTION INTRAVENOUS at 22:36

## 2020-02-04 NOTE — PROGRESS NOTES
Assessment/Plan:  Patient go to ER for further evaluation for these conditions  Diagnoses and all orders for this visit:    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 (HCC)    Leg swelling    Amputated below knee, left (HCC)    Chronic diastolic heart failure (HCC)            Subjective:        Patient ID: Young Stroud is a 60 y o  male  Patient is here due to swelling right lower extremity  Patient also with rash on anterior shin  Patient with some mild shortness of breath intermittently  No chest pain noted  No fever or chills  The following portions of the patient's history were reviewed and updated as appropriate: allergies, current medications, past family history, past medical history, past social history, past surgical history and problem list       Review of Systems   Constitutional: Negative  HENT: Negative  Eyes: Negative  Respiratory: Positive for shortness of breath  Cardiovascular: Positive for leg swelling  Gastrointestinal: Negative  Endocrine: Negative  Genitourinary: Negative  Musculoskeletal: Negative  Skin: Negative  Allergic/Immunologic: Negative  Neurological: Negative  Hematological: Negative  Psychiatric/Behavioral: Negative  Objective:      BMI Counseling: Body mass index is 34 92 kg/m²  The BMI is above normal  Nutrition recommendations include decreasing portion sizes  Exercise recommendations include exercising 3-5 times per week  /72 (BP Location: Left arm)   Pulse 74   Resp 18   Wt 123 kg (272 lb)   BMI 34 92 kg/m²          Physical Exam   Constitutional: He appears well-developed and well-nourished  No distress  HENT:   Head: Normocephalic  Right Ear: External ear normal    Left Ear: External ear normal    Mouth/Throat: Oropharynx is clear and moist  No oropharyngeal exudate  Eyes: Pupils are equal, round, and reactive to light   EOM are normal  Right eye exhibits no discharge  Left eye exhibits no discharge  No scleral icterus  Neck: Normal range of motion  Neck supple  No thyromegaly present  Cardiovascular: Normal rate, regular rhythm, normal heart sounds and intact distal pulses  Exam reveals no gallop and no friction rub  No murmur heard  Pulmonary/Chest: Effort normal and breath sounds normal  No respiratory distress  He has no wheezes  He has no rales  He exhibits no tenderness  Musculoskeletal: Normal range of motion  He exhibits edema  He exhibits no tenderness  2/4 pitting edema right lower extremity  Lymphadenopathy:     He has no cervical adenopathy  Neurological: He is alert  No cranial nerve deficit  He exhibits normal muscle tone  Coordination normal    Skin: Skin is warm and dry  Rash noted  He is not diaphoretic  No erythema  No pallor  Erythematous rash anterior shin   Psychiatric: He has a normal mood and affect  His behavior is normal  Judgment and thought content normal    Nursing note and vitals reviewed

## 2020-02-04 NOTE — ED PROVIDER NOTES
History  Chief Complaint   Patient presents with    Shortness of Breath     Pt  was seen at family doctor and sent here for SOB and right leg swelling  Pt  reports generalized weakness  Pt  is SOB during triage while sitting  50-year-old male with a history of diabetes, CAD, CABG in 2018, left AKA presents to the emergency department with increasing fatigue and shortness of breath which is progressively been getting worse over the last several weeks  He saw his family doctor today for these complaints and was referred here to the emergency department  Patient states he is having trouble doing his normal activity of daily living and can no longer go up the steps secondary to extreme fatigue and shortness of breath  He also is complaining of increasing right lower extremity swelling which had been evaluated and DVT was ruled out  History provided by:  Patient   used: No    Shortness of Breath   Severity:  Unable to specify  Onset quality:  Gradual  Duration:  2 weeks  Timing:  Constant  Progression:  Worsening  Chronicity:  New  Context: activity    Relieved by:  Rest  Worsened by: Activity  Ineffective treatments:  None tried  Associated symptoms: no abdominal pain, no chest pain, no claudication, no cough, no diaphoresis, no ear pain, no fever, no headaches, no hemoptysis, no neck pain, no PND, no rash, no sore throat, no sputum production, no syncope, no swollen glands, no vomiting and no wheezing    Risk factors: obesity    Risk factors: no recent alcohol use, no hx of cancer, no hx of PE/DVT and no tobacco use        Prior to Admission Medications   Prescriptions Last Dose Informant Patient Reported?  Taking?   atorvastatin (LIPITOR) 80 mg tablet   Yes Yes   Sig: Take 80 mg by mouth daily   clopidogrel (PLAVIX) 75 mg tablet   No Yes   Sig: Take 1 tablet (75 mg total) by mouth daily   ferrous sulfate 325 (65 Fe) mg tablet   No Yes   Sig: Take 1 tablet (325 mg total) by mouth daily with breakfast   finasteride (PROSCAR) 5 mg tablet   No Yes   Sig: Take 1 tablet (5 mg total) by mouth daily   furosemide (LASIX) 40 mg tablet   Yes Yes   Sig: Take 40 mg by mouth 2 (two) times a day   glucose blood test strip   Yes No   Sig: ReliOn Prime Meter   insulin glargine (LANTUS) 100 units/mL subcutaneous injection   No Yes   Sig: Inject 40 Units under the skin 2 (two) times a day   metoclopramide (REGLAN) 5 mg tablet   No Yes   Sig: Take 1 tablet (5 mg total) by mouth daily   pantoprazole (PROTONIX) 40 mg tablet   No Yes   Sig: Take 1 tablet (40 mg total) by mouth daily in the early morning   tamsulosin (FLOMAX) 0 4 mg   No Yes   Sig: Take 1 capsule (0 4 mg total) by mouth daily with dinner      Facility-Administered Medications: None       Past Medical History:   Diagnosis Date    Anemia     Anxiety and depression     Autonomic neuropathy     Cataract     Coronary artery disease     Diabetes mellitus ([facility] )     Diabetic autonomic neuropathy associated with type 2 diabetes mellitus ([facility] )     Last Assessed: 12/28/2017    Gastroparesis due to DM (HCC)     History of DVT (deep vein thrombosis)     left LE    HTN (hypertension)     Hyperlipidemia     Non healing left heel wound     Obesity     Orthostatic hypotension        Past Surgical History:   Procedure Laterality Date    LEG AMPUTATION THROUGH LOWER TIBIA AND FIBULA Left 8/3/2018    Procedure: AMPUTATION BELOW KNEE (BKA) L BKA;  Surgeon: Raquel Tubbs MD;  Location: [facility];  Service: Vascular    MN CABG, ARTERY-VEIN, FOUR N/A 3/28/2018    Procedure: CORONARY ARTERY BYPASS GRAFT (CABG) x3 VESSELS, LIMA TO LAD, SVG--> PDA, SVG--> OM2,  RIGHT LEG EVH/SVH TO , INTRA-OP AMANDEEP;  Surgeon: Cordelia Fields MD;  Location: [facility];  Service: Cardiac Surgery    ROTATOR CUFF REPAIR Bilateral        Family History   Problem Relation Age of Onset    Atrial fibrillation Mother     Stroke Mother     Hypertension Father     Heart attack Paternal Grandfather 61     I have reviewed and agree with the history as documented  Social History     Tobacco Use    Smoking status: Former Smoker     Packs/day: 1 00     Years: 12 00     Pack years: 12 00     Types: Cigarettes     Last attempt to quit: 3/23/1994     Years since quittin 8    Smokeless tobacco: Never Used   Substance Use Topics    Alcohol use: No    Drug use: No        Review of Systems   Constitutional: Negative  Negative for diaphoresis and fever  HENT: Negative  Negative for ear pain and sore throat  Eyes: Negative  Respiratory: Positive for shortness of breath  Negative for cough, hemoptysis, sputum production and wheezing  Cardiovascular: Negative  Negative for chest pain, claudication, syncope and PND  Gastrointestinal: Negative  Negative for abdominal pain and vomiting  Genitourinary: Negative  Musculoskeletal: Negative for neck pain  Skin: Negative  Negative for rash  Allergic/Immunologic: Negative  Neurological: Negative  Negative for weakness, numbness and headaches  Hematological: Negative  Psychiatric/Behavioral: Negative  All other systems reviewed and are negative  Physical Exam  Physical Exam   Constitutional: He is oriented to person, place, and time  He appears well-developed and well-nourished  Non-toxic appearance  He does not have a sickly appearance  He does not appear ill  No distress  HENT:   Head: Normocephalic and atraumatic  Right Ear: External ear normal    Left Ear: External ear normal    Eyes: Pupils are equal, round, and reactive to light  Conjunctivae are normal  No scleral icterus  Cardiovascular: Normal rate, regular rhythm and normal heart sounds  Pulmonary/Chest: Effort normal and breath sounds normal    Abdominal: Soft  Bowel sounds are normal  He exhibits no distension and no mass  There is no tenderness  No hernia  Musculoskeletal: Normal range of motion   He exhibits edema (Pitting edema right lower extremity with skin changes consistent with venous stasis)  He exhibits no tenderness or deformity  Neurological: He is alert and oriented to person, place, and time  He has normal strength and normal reflexes  He exhibits normal muscle tone  Skin: Skin is warm and dry  No rash noted  He is not diaphoretic  No erythema  No pallor  Psychiatric: He has a normal mood and affect  Nursing note and vitals reviewed        Vital Signs  ED Triage Vitals [02/04/20 1708]   Temperature Pulse Respirations Blood Pressure SpO2   97 5 °F (36 4 °C) 70 (!) 26 135/63 95 %      Temp Source Heart Rate Source Patient Position - Orthostatic VS BP Location FiO2 (%)   Oral Monitor Sitting Right arm --      Pain Score       No Pain           Vitals:    02/04/20 1708   BP: 135/63   Pulse: 70   Patient Position - Orthostatic VS: Sitting         Visual Acuity      ED Medications  Medications - No data to display    Diagnostic Studies  Results Reviewed     Procedure Component Value Units Date/Time    APTT [[id number]]  (Normal) Collected:  02/04/20 1732    Lab Status:  Final result Specimen:  Blood from Arm, Left Updated:  02/04/20 1750     PTT 26 seconds     Protime-INR [[id number]]  (Normal) Collected:  02/04/20 1732    Lab Status:  Final result Specimen:  Blood from Arm, Left Updated:  02/04/20 1750     Protime 13 4 seconds      INR 1 01    CBC and differential [[id number]] Collected:  02/04/20 1732    Lab Status:  Final result Specimen:  Blood from Arm, Left Updated:  02/04/20 1738     WBC 8 55 Thousand/uL      RBC 4 89 Million/uL      Hemoglobin 13 5 g/dL      Hematocrit 42 7 %      MCV 87 fL      MCH 27 6 pg      MCHC 31 6 g/dL      RDW 13 9 %      MPV 9 5 fL      Platelets 384 Thousands/uL      nRBC 0 /100 WBCs      Neutrophils Relative 70 %      Immat GRANS % 0 %      Lymphocytes Relative 21 %      Monocytes Relative 6 %      Eosinophils Relative 2 %      Basophils Relative 1 %      Neutrophils Absolute 5 91 Thousands/µL Immature Grans Absolute 0 02 Thousand/uL      Lymphocytes Absolute 1 81 Thousands/µL      Monocytes Absolute 0 55 Thousand/µL      Eosinophils Absolute 0 20 Thousand/µL      Basophils Absolute 0 06 Thousands/µL     NT-BNP PRO [[id number]] Collected:  02/04/20 1732    Lab Status: In process Specimen:  Blood from Arm, Left Updated:  02/04/20 1738    Comprehensive metabolic panel [[id number]] Collected:  02/04/20 1732    Lab Status: In process Specimen:  Blood from Arm, Left Updated:  02/04/20 1738    Troponin I [[id number]] Collected:  02/04/20 1732    Lab Status: In process Specimen:  Blood from Arm, Left Updated:  02/04/20 1737    Fingerstick Glucose (POCT) [[id number]]  (Abnormal) Collected:  02/04/20 1715    Lab Status:  Final result Updated:  02/04/20 1716     POC Glucose 164 mg/dl                  XR chest 2 views    (Results Pending)              Procedures  ECG 12 Lead Documentation Only  Date/Time: 2/4/2020 5:56 PM  Performed by: Simona Galan DO  Authorized by: Simona Galan DO     Indications / Diagnosis:  Shortness of breath  ECG reviewed by me, the ED Provider: yes    Patient location:  ED  Previous ECG:     Previous ECG:  Compared to current    Comparison ECG info:  8/11/19    Similarity:  No change  Interpretation:     Interpretation: non-specific    Rate:     ECG rate assessment: normal    Rhythm:     Rhythm: sinus rhythm    Ectopy:     Ectopy: none    Conduction:     Conduction: normal    ST segments:     ST segments:  Non-specific    Elevation:  V5 and V6    Depression:  I  T waves:     T waves: inverted      Inverted:  I and aVL             ED Course                               MDM  Number of Diagnoses or Management Options  Diagnosis management comments: 59-year-old male presents with worsening fatigue and shortness of breath over the last several weeks  He states he can no longer perform his usual activity of daily living and cannot go upstairs anymore secondary to the symptoms    He saw his family doctor today and was referred here for further evaluation  He does have a history of CAD with CABG in 2018  Since then he has not been evaluated cardiovascular only  On exam he appears well in no distress  Cardiac workup already in progress  Given his symptoms and significant cardiac history will admit for rule out ACS  Amount and/or Complexity of Data Reviewed  Clinical lab tests: ordered and reviewed  Tests in the radiology section of CPT®: ordered and reviewed  Review and summarize past medical records: yes  Discuss the patient with other providers: yes  Independent visualization of images, tracings, or specimens: yes          Disposition  Final diagnoses:   None     ED Disposition     None      Follow-up Information    None         Patient's Medications   Discharge Prescriptions    No medications on file     No discharge procedures on file      ED Provider  Electronically Signed by           Ritchie Bean,   02/04/20 160 Edson Almaguer DO  02/04/20 2223

## 2020-02-05 ENCOUNTER — APPOINTMENT (OUTPATIENT)
Dept: NON INVASIVE DIAGNOSTICS | Facility: HOSPITAL | Age: 60
DRG: 292 | End: 2020-02-05
Payer: COMMERCIAL

## 2020-02-05 LAB
ANION GAP SERPL CALCULATED.3IONS-SCNC: 9 MMOL/L (ref 4–13)
ATRIAL RATE: 68 BPM
BUN SERPL-MCNC: 16 MG/DL (ref 5–25)
CALCIUM SERPL-MCNC: 8.3 MG/DL (ref 8.3–10.1)
CHLORIDE SERPL-SCNC: 102 MMOL/L (ref 100–108)
CO2 SERPL-SCNC: 28 MMOL/L (ref 21–32)
CREAT SERPL-MCNC: 1.06 MG/DL (ref 0.6–1.3)
EST. AVERAGE GLUCOSE BLD GHB EST-MCNC: 197 MG/DL
GFR SERPL CREATININE-BSD FRML MDRD: 76 ML/MIN/1.73SQ M
GLUCOSE P FAST SERPL-MCNC: 139 MG/DL (ref 65–99)
GLUCOSE SERPL-MCNC: 133 MG/DL (ref 65–140)
GLUCOSE SERPL-MCNC: 139 MG/DL (ref 65–140)
GLUCOSE SERPL-MCNC: 144 MG/DL (ref 65–140)
GLUCOSE SERPL-MCNC: 207 MG/DL (ref 65–140)
GLUCOSE SERPL-MCNC: 214 MG/DL (ref 65–140)
GLUCOSE SERPL-MCNC: 217 MG/DL (ref 65–140)
HBA1C MFR BLD: 8.5 % (ref 4.2–6.3)
P AXIS: 63 DEGREES
P AXIS: 63 DEGREES
P AXIS: 68 DEGREES
POTASSIUM SERPL-SCNC: 3.7 MMOL/L (ref 3.5–5.3)
PR INTERVAL: 136 MS
PR INTERVAL: 146 MS
PR INTERVAL: 148 MS
QRS AXIS: -6 DEGREES
QRS AXIS: -7 DEGREES
QRS AXIS: -9 DEGREES
QRSD INTERVAL: 88 MS
QRSD INTERVAL: 92 MS
QRSD INTERVAL: 92 MS
QT INTERVAL: 382 MS
QT INTERVAL: 388 MS
QT INTERVAL: 420 MS
QTC INTERVAL: 406 MS
QTC INTERVAL: 412 MS
QTC INTERVAL: 446 MS
SODIUM SERPL-SCNC: 139 MMOL/L (ref 136–145)
T WAVE AXIS: 126 DEGREES
T WAVE AXIS: 131 DEGREES
T WAVE AXIS: 137 DEGREES
TROPONIN I SERPL-MCNC: <0.02 NG/ML
TSH SERPL DL<=0.05 MIU/L-ACNC: 4.71 UIU/ML (ref 0.36–3.74)
VENTRICULAR RATE: 68 BPM

## 2020-02-05 PROCEDURE — 97163 PT EVAL HIGH COMPLEX 45 MIN: CPT | Performed by: PHYSICAL THERAPIST

## 2020-02-05 PROCEDURE — 80048 BASIC METABOLIC PNL TOTAL CA: CPT | Performed by: PHYSICIAN ASSISTANT

## 2020-02-05 PROCEDURE — 93010 ELECTROCARDIOGRAM REPORT: CPT | Performed by: INTERNAL MEDICINE

## 2020-02-05 PROCEDURE — 3052F HG A1C>EQUAL 8.0%<EQUAL 9.0%: CPT | Performed by: FAMILY MEDICINE

## 2020-02-05 PROCEDURE — 93971 EXTREMITY STUDY: CPT | Performed by: SURGERY

## 2020-02-05 PROCEDURE — 99232 SBSQ HOSP IP/OBS MODERATE 35: CPT | Performed by: INTERNAL MEDICINE

## 2020-02-05 PROCEDURE — 93005 ELECTROCARDIOGRAM TRACING: CPT

## 2020-02-05 PROCEDURE — 82948 REAGENT STRIP/BLOOD GLUCOSE: CPT

## 2020-02-05 PROCEDURE — 97112 NEUROMUSCULAR REEDUCATION: CPT | Performed by: PHYSICAL THERAPIST

## 2020-02-05 PROCEDURE — 84436 ASSAY OF TOTAL THYROXINE: CPT | Performed by: DERMATOLOGY

## 2020-02-05 PROCEDURE — 84443 ASSAY THYROID STIM HORMONE: CPT | Performed by: DERMATOLOGY

## 2020-02-05 PROCEDURE — 84484 ASSAY OF TROPONIN QUANT: CPT | Performed by: PHYSICIAN ASSISTANT

## 2020-02-05 PROCEDURE — 93971 EXTREMITY STUDY: CPT

## 2020-02-05 PROCEDURE — 97166 OT EVAL MOD COMPLEX 45 MIN: CPT

## 2020-02-05 RX ORDER — TRIAMCINOLONE ACETONIDE 1 MG/G
CREAM TOPICAL 2 TIMES DAILY
Status: DISCONTINUED | OUTPATIENT
Start: 2020-02-05 | End: 2020-02-11 | Stop reason: HOSPADM

## 2020-02-05 RX ADMIN — PANTOPRAZOLE SODIUM 40 MG: 40 TABLET, DELAYED RELEASE ORAL at 06:26

## 2020-02-05 RX ADMIN — TRIAMCINOLONE ACETONIDE: 1 CREAM TOPICAL at 21:32

## 2020-02-05 RX ADMIN — HEPARIN SODIUM 5000 UNITS: 5000 INJECTION INTRAVENOUS; SUBCUTANEOUS at 06:26

## 2020-02-05 RX ADMIN — FINASTERIDE 5 MG: 5 TABLET, FILM COATED ORAL at 08:56

## 2020-02-05 RX ADMIN — TAMSULOSIN HYDROCHLORIDE 0.4 MG: 0.4 CAPSULE ORAL at 16:58

## 2020-02-05 RX ADMIN — FUROSEMIDE 40 MG: 40 TABLET ORAL at 08:56

## 2020-02-05 RX ADMIN — HEPARIN SODIUM 5000 UNITS: 5000 INJECTION INTRAVENOUS; SUBCUTANEOUS at 14:35

## 2020-02-05 RX ADMIN — INSULIN LISPRO 2 UNITS: 100 INJECTION, SOLUTION INTRAVENOUS; SUBCUTANEOUS at 12:38

## 2020-02-05 RX ADMIN — INSULIN GLARGINE 40 UNITS: 100 INJECTION, SOLUTION SUBCUTANEOUS at 08:56

## 2020-02-05 RX ADMIN — INSULIN LISPRO 1 UNITS: 100 INJECTION, SOLUTION INTRAVENOUS; SUBCUTANEOUS at 16:58

## 2020-02-05 RX ADMIN — INSULIN GLARGINE 40 UNITS: 100 INJECTION, SOLUTION SUBCUTANEOUS at 17:00

## 2020-02-05 RX ADMIN — FUROSEMIDE 40 MG: 40 TABLET ORAL at 16:58

## 2020-02-05 RX ADMIN — ATORVASTATIN CALCIUM 80 MG: 80 TABLET, FILM COATED ORAL at 16:58

## 2020-02-05 RX ADMIN — INSULIN LISPRO 2 UNITS: 100 INJECTION, SOLUTION INTRAVENOUS; SUBCUTANEOUS at 21:31

## 2020-02-05 RX ADMIN — ASPIRIN 325 MG ORAL TABLET 325 MG: 325 PILL ORAL at 00:26

## 2020-02-05 RX ADMIN — CLOPIDOGREL 75 MG: 75 TABLET, FILM COATED ORAL at 08:56

## 2020-02-05 RX ADMIN — HEPARIN SODIUM 5000 UNITS: 5000 INJECTION INTRAVENOUS; SUBCUTANEOUS at 21:31

## 2020-02-05 RX ADMIN — HEPARIN SODIUM 5000 UNITS: 5000 INJECTION INTRAVENOUS; SUBCUTANEOUS at 00:26

## 2020-02-05 RX ADMIN — METOCLOPRAMIDE HYDROCHLORIDE 5 MG: 10 TABLET ORAL at 08:56

## 2020-02-05 NOTE — SOCIAL WORK
CM met with pt at bedside to plan for d/c  Pt lives with his wife in a 2 story town house with 4 steps to enter  Pt has a first floor set up  He sleeps in a recliner downstairs  He has a hospital bed on the first floor but states it is not comfortable  Pt has a wheelchair, walker and quad cane at home  His PCP is Albino Heady  Pt needed some assistance with ADL's before admission and reports he was getting gradually weaker  A post acute care recommendation was made by your care team for STR  Discussed Freedom of Choice with patient  List of facilities given to patient via in person  patient aware the list is custom filtered for them by zip code location and that [facility] post acute providers are designated  He has a history of being at [facility] in the past and would like to return there  Pt states his wife works for [facility] and their insurance is through there - so he feels it is in his financial interest go to [facility]  Referral sent

## 2020-02-05 NOTE — PLAN OF CARE
Problem: PHYSICAL THERAPY ADULT  Goal: Performs mobility at highest level of function for planned discharge setting  See evaluation for individualized goals  Description  Treatment/Interventions: Functional transfer training, LE strengthening/ROM, Elevations, Therapeutic exercise, Endurance training, Patient/family training, Equipment eval/education, Bed mobility, Gait training, Spoke to nursing, OT  Equipment Recommended: Nyra Deal, Wheelchair       See flowsheet documentation for full assessment, interventions and recommendations  Note:   Prognosis: Fair  Problem List: Decreased strength, Decreased endurance, Impaired balance, Decreased mobility, Impaired judgement, Decreased safety awareness, Impaired sensation, Obesity, Decreased skin integrity  Assessment: pt admitted with compalint of shortness of breath and declining mobility  pt dx wtih r/o dvt/pe and referred to Pt  pt was living with wife, alone during day and being mostly sedentery, getting around in w/c  pt had prior rehab for prosthetic training and feels he needs a tune up due to immobility and not performing HEP at home  pt was barely able to perform 2+1 stairs to enter home and having difficulty with sit to stand, donning compression stocking on right  pt demonstrated moderate to severe runctional limitations due to comorbities, l bka and prior debility  pt was able to transfer sit to stand from recliner with mod assist of 2 and amb only 6' with wide based very unsteady gait using rw  pt will require skilled PT for deficits in strength, balance, gait sequencing, gait stability, high fall risk , deficts in skin integrity, joint tntegrity, sensation  pt will need rehab  pt was educated in effects of immobility and need to care for own needs to degree needed to maintain mobility  explained risks of immobility and medical nocompliance  pt wa concerned about financial burdens of maintaining physical health           Recommendation: Post acute IP rehab(pt desires [facility])     PT - OK to Discharge: (rehab)    See flowsheet documentation for full assessment

## 2020-02-05 NOTE — ASSESSMENT & PLAN NOTE
Lab Results   Component Value Date    HGBA1C 9 5 (A) 06/24/2019       Recent Labs     02/04/20  1715   POCGLU 164*       Blood Sugar Average: Last 72 hrs:  (P) 164   Continue reglan

## 2020-02-05 NOTE — PLAN OF CARE
Problem: Potential for Falls  Goal: Patient will remain free of falls  Description  INTERVENTIONS:  - Assess patient frequently for physical needs  -  Identify cognitive and physical deficits and behaviors that affect risk of falls    -  [place] fall precautions as indicated by assessment   - Educate patient/family on patient safety including physical limitations  - Instruct patient to call for assistance with activity based on assessment  - Modify environment to reduce risk of injury  - Consider OT/PT consult to assist with strengthening/mobility  Outcome: Progressing     Problem: Prexisting or High Potential for Compromised Skin Integrity  Goal: Skin integrity is maintained or improved  Description  INTERVENTIONS:  - Identify patients at risk for skin breakdown  - Assess and monitor skin integrity  - Assess and monitor nutrition and hydration status  - Monitor labs   - Assess for incontinence   - Turn and reposition patient  - Assist with mobility/ambulation  - Relieve pressure over bony prominences  - Avoid friction and shearing  - Provide appropriate hygiene as needed including keeping skin clean and dry  - Evaluate need for skin moisturizer/barrier cream  - Collaborate with interdisciplinary team   - Patient/family teaching  - Consider wound care consult   Outcome: Progressing     Problem: CARDIOVASCULAR - ADULT  Goal: Maintains optimal cardiac output and hemodynamic stability  Description  INTERVENTIONS:  - Monitor I/O, vital signs and rhythm  - Monitor for S/S and trends of decreased cardiac output  - Administer and titrate ordered vasoactive medications to optimize hemodynamic stability  - Assess quality of pulses, skin color and temperature  - Assess for signs of decreased coronary artery perfusion  - Instruct patient to report change in severity of symptoms  Outcome: Progressing  Goal: Absence of cardiac dysrhythmias or at baseline rhythm  Description  INTERVENTIONS:  - Continuous cardiac monitoring, vital signs, obtain 12 lead EKG if ordered  - Administer antiarrhythmic and heart rate control medications as ordered  - Monitor electrolytes and administer replacement therapy as ordered  Outcome: Progressing     Problem: RESPIRATORY - ADULT  Goal: Achieves optimal ventilation and oxygenation  Description  INTERVENTIONS:  - Assess for changes in respiratory status  - Assess for changes in mentation and behavior  - Position to facilitate oxygenation and minimize respiratory effort  - Oxygen administered by appropriate delivery if ordered  - Initiate smoking cessation education as indicated  - Encourage broncho-pulmonary hygiene including cough, deep breathe, Incentive Spirometry  - Assess the need for suctioning and aspirate as needed  - Assess and instruct to report SOB or any respiratory difficulty  - Respiratory Therapy support as indicated  Outcome: Progressing     Problem: SKIN/TISSUE INTEGRITY - ADULT  Goal: Skin integrity remains intact  Description  INTERVENTIONS  - Identify patients at risk for skin breakdown  - Assess and monitor skin integrity  - Assess and monitor nutrition and hydration status  - Monitor labs (i e  albumin)  - Assess for incontinence   - Turn and reposition patient  - Assist with mobility/ambulation  - Relieve pressure over bony prominences  - Avoid friction and shearing  - Provide appropriate hygiene as needed including keeping skin clean and dry  - Evaluate need for skin moisturizer/barrier cream  - Collaborate with interdisciplinary team (i e  Nutrition, Rehabilitation, etc )   - Patient/family teaching  Outcome: Progressing     Problem: MUSCULOSKELETAL - ADULT  Goal: Maintain or return mobility to safest level of function  Description  INTERVENTIONS:  - Assess patient's ability to carry out ADLs; assess patient's baseline for ADL function and identify physical deficits which impact ability to perform ADLs (bathing, care of mouth/teeth, toileting, grooming, dressing, etc )  - Assess/evaluate cause of self-care deficits   - Assess range of motion  - Assess patient's mobility  - Assess patient's need for assistive devices and provide as appropriate  - Encourage maximum independence but intervene and supervise when necessary  - Involve family in performance of ADLs  - Assess for home care needs following discharge   - Consider OT consult to assist with ADL evaluation and planning for discharge  - Provide patient education as appropriate  Outcome: Progressing     Problem: PAIN - ADULT  Goal: Verbalizes/displays adequate comfort level or baseline comfort level  Description  Interventions:  - Encourage patient to monitor pain and request assistance  - Assess pain using appropriate pain scale  - Administer analgesics based on type and severity of pain and evaluate response  - Implement non-pharmacological measures as appropriate and evaluate response  - Consider cultural and social influences on pain and pain management  - Notify physician/advanced practitioner if interventions unsuccessful or patient reports new pain  Outcome: Progressing     Problem: INFECTION - ADULT  Goal: Absence or prevention of progression during hospitalization  Description  INTERVENTIONS:  - Assess and monitor for signs and symptoms of infection  - Monitor lab/diagnostic results  - Monitor all insertion sites, i e  indwelling lines, tubes, and drains  - Monitor endotracheal if appropriate and nasal secretions for changes in amount and color  - [place] appropriate cooling/warming therapies per order  - Administer medications as ordered  - Instruct and encourage patient and family to use good hand hygiene technique  - Identify and instruct in appropriate isolation precautions for identified infection/condition  Outcome: Progressing     Problem: DISCHARGE PLANNING  Goal: Discharge to home or other facility with appropriate resources  Description  INTERVENTIONS:  - Identify barriers to discharge w/patient and caregiver  - Arrange for needed discharge resources and transportation as appropriate  - Identify discharge learning needs (meds, wound care, etc )  - Arrange for interpretive services to assist at discharge as needed  - Refer to Case Management Department for coordinating discharge planning if the patient needs post-hospital services based on physician/advanced practitioner order or complex needs related to functional status, cognitive ability, or social support system  Outcome: Progressing     Problem: Knowledge Deficit  Goal: Patient/family/caregiver demonstrates understanding of disease process, treatment plan, medications, and discharge instructions  Description  Complete learning assessment and assess knowledge base    Interventions:  - Provide teaching at level of understanding  - Provide teaching via preferred learning methods  Outcome: Progressing

## 2020-02-05 NOTE — PHYSICAL THERAPY NOTE
Physical Therapy Evaluation      Patient Active Problem List   Diagnosis    Anxiety and depression    Benign essential HTN    [facility]    Hyperlipidemia    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 ([facility])    Vitamin D deficiency    PAD (peripheral artery disease) ([facility])    Elevated troponin    S/P BKA (below knee amputation), left ([facility])    Orthostatic hypotension    Diabetic gastroparesis ([facility])    Obesity (BMI 35 0-39 9 without comorbidity)    Triple vessel coronary artery disease    Chronic diastolic heart failure ([facility])    Other constipation    S/P CABG x 3    Diabetic autonomic neuropathy associated with type 2 diabetes mellitus ([facility])    Hyponatremia    Encounter for postoperative care    Diabetic ulcer of left heel associated with type 2 diabetes mellitus, limited to breakdown of skin ([place] [address] )    Healthcare-associated pneumonia    Chronic CHF ([place] [address] )    Febrile illness, acute    Post-procedural fever    Preop cardiovascular exam    Obstructive sleep apnea    Amputated below knee, left ([facility])    Phantom limb syndrome without pain ([facility])    Folliculitis    Leg swelling    Morbid obesity ([facility])    CAD (coronary artery disease)    Chronic venous insufficiency    Insulin dependent diabetes mellitus ([facility])    PATINO (dyspnea on exertion)    Nodular rash       Past Medical History:   Diagnosis Date    Anemia     Anxiety and depression     Autonomic neuropathy     Cataract     Coronary artery disease     Diabetes mellitus ([facility] )     Diabetic autonomic neuropathy associated with type 2 diabetes mellitus ([place] [address] )     Last Assessed: 12/28/2017    Gastroparesis due to DM ([facility])     History of DVT (deep vein thrombosis)     left LE    HTN (hypertension)     Hyperlipidemia     Non healing left heel wound     Obesity     Orthostatic hypotension        Past Surgical History:   Procedure Laterality Date    LEG AMPUTATION THROUGH LOWER TIBIA AND FIBULA Left 8/3/2018    Procedure: AMPUTATION BELOW KNEE (BKA) L BKA;  Surgeon: Cristiane Vega MD;  [facility];  Service: Vascular    NV CABG, ARTERY-VEIN, FOUR N/A 3/28/2018    Procedure: CORONARY ARTERY BYPASS GRAFT (CABG) x3 VESSELS, LIMA TO LAD, SVG--> PDA, SVG--> OM2,  RIGHT LEG EVH/SVH TO , INTRA-OP AMANDEEP;  Surgeon: Kandy Choudhury MD;  [facility];  Service: Cardiac Surgery    ROTATOR CUFF REPAIR Bilateral       02/05/20 1219   Note Type   Note type Eval/Treat   Pain Assessment   Pain Assessment No/denies pain   Home Living   Type of [address] [facility] Two level; Able to live on main level with bedroom/bathroom;Stairs to enter with rails   [address]; Wheelchair-manual   Prior Function   Level of [place] Independent with ADLs and functional mobility   Lives With Spouse; Son   Cam Help From Family   ADL Assistance Independent   IADLs Needs assistance   Falls in the last 6 months 1 to 4  (1 fall and 1 near fall due to knees giving way)   Comments pt reports being indep with bed mobility, transfers and stairs to enter home but is having greater difficulty due to rle swelling, weakness and lack of endurance  wofe works 2 jobs  pt reports now being barely able to stand or walk   Restrictions/Precautions   Weight Bearing Precautions Per Order No   Braces or Orthoses Prosthesis  (lle below knee)   Other Precautions Fall Risk   General   Additional Pertinent History pt had [facility] and then [facility] for prosthetic training  pt reoprts being able to amb using rw pretty well  came home, did not follow through [facility] exercise and mobility, now deconditioned  reports he was searing compression hose on rle but too hard to put on  does not participate in PT as OP due to copay costs  declined home PT for same reasons  pt citing financial and motivational reasons for decline      Family/Caregiver Present No   Cognition   Overall Cognitive Status WFL   Orientation Level Oriented X4   RUE Assessment   RUE Assessment WFL   LUE Assessment   LUE Assessment WFL   RLE Assessment   RLE Assessment X  (hip F 4+/5, Q 4-/5, foot drop, moderate edema)   LLE Assessment   LLE Assessment X  (hipF 4+/5, Q 4/5)   Coordination   Movements are Fluid and Coordinated 1   Sensation X   Light Touch   RLE Light Touch Absent   RLE Light Touch Comments ankle to thigh   Transfers   Sit to Stand 3  Moderate assistance   Additional items Assist x 2; Increased time required;Verbal cues;Armrests   Stand to Sit 4  Minimal assistance   Additional items Assist x 2; Increased time required;Verbal cues;Armrests   Additional Comments mementum needed   Ambulation/Elevation   Gait pattern Wide ROBBY; Decreased foot clearance; Forward Flexion; Inconsistent gerard; Short stride; Excessively slow; Step to   Gait Assistance 3  Moderate assist   Additional items Assist x 2;Verbal cues; Tactile cues   Assistive Device Rolling walker  (needs wide rw)   Distance 6'   Balance   Static Sitting Fair +   Dynamic Sitting Fair   Static Standing Fair -   Dynamic Standing Poor +   Ambulatory Poor +   Endurance Deficit   Endurance Deficit Yes   Endurance Deficit Description moderate sandhu, unsteady, unsafe   Activity Tolerance   Activity Tolerance Treatment limited secondary to medical complications (Comment); Patient limited by fatigue   Medical Staff Made Aware OT   Nurse Made Aware yes   Assessment   Prognosis Fair   Problem List Decreased strength;Decreased endurance; Impaired balance;Decreased mobility; Impaired judgement;Decreased safety awareness; Impaired sensation;Obesity; Decreased skin integrity   Assessment pt admitted with compalint of shortness of breath and declining mobility   pt dx wtih r/o dvt/pe and referred to Pt  pt was living with wife, alone during day and being mostly sedentery, getting around in w/c  pt had prior rehab for prosthetic training and feels he needs a tune up due to immobility and not performing HEP at home  pt was barely able to perform 2+1 stairs to enter home and having difficulty with sit to stand, donning compression stocking on right  pt demonstrated moderate to severe runctional limitations due to comorbities, l bka and prior debility  pt was able to transfer sit to stand from recliner with mod assist of 2 and amb only 6' with wide based very unsteady gait using rw  pt will require skilled PT for deficits in strength, balance, gait sequencing, gait stability, high fall risk , deficts in skin integrity, joint tntegrity, sensation  pt will need rehab  pt was educated in effects of immobility and need to care for own needs to degree needed to maintain mobility  explained risks of immobility and medical nocompliance  pt wa concerned about financial burdens of maintaining physical health  Goals   Patient Goals get strength back   STG Expiration Date 02/19/20   Short Term Goal #1 bed mobility wtih supervision  transfers with min assist of 1  amb using rw for 40-50' wtih upright posture and safe pattern wtih mmin assist of 2, chair follow  imporove strength and balance by 1/2 grade  demonstrate good safety practices  Plan   Treatment/Interventions Functional transfer training;LE strengthening/ROM; Elevations; Therapeutic exercise; Endurance training;Patient/family training;Equipment eval/education; Bed mobility;Gait training;Spoke to nursing;OT   PT Frequency   (3-5x/week)   Recommendation   Recommendation Post acute IP rehab  (pt desires [facility])   Equipment Recommended Sissy Dire; Wheelchair   PT - OK to Discharge   (rehab)   Modified [place] Scale   Modified [place] Scale 4   Barthel Index   Feeding 10   Bathing 0   Grooming Score 5   Dressing Score 5   Bladder Score 10   Bowels Score 10   Toilet Use Score 5   Transfers (Bed/Chair) Score 5   Mobility (Level Surface) Score 0   Stairs Score 0   Barthel Index Score 50   History: co - morbidities, fall risk, use of assistive device, assist for adl's,   Exam: impairments in locomotion, musculoskeletal, balance,posture, joint integrity, skin integrity,   Clinical: unstable/unpredictable  Complexity:high    Kirstin Snell PT  Time In:1201  Time OLL:5979  Total Time: 1217    S:  Doesn't have $$ to get therapy at home or as OP  O:  Pt educated in HEP for chair side there ex  Practiced each for 5-10 reps  Intolerant of exercise after 10 reps due to sandhu, fatigue and weakness  Pt instructed in hip flexion, laq, hip ab/add ble in sitting  Also instructed in chair pressups and could only perform 2 of them, only slightly off weighting buttocks   Pt also educated in need to continue HEP once done with rehab or this will become habitual with gradual decline to bedbound   A:  Voiced understanding of teaching, exercise, plan of care  P:  Continue PT per plan of care    Kirstin Snell PT

## 2020-02-05 NOTE — ASSESSMENT & PLAN NOTE
Lab Results   Component Value Date    HGBA1C 9 5 (A) 06/24/2019       Recent Labs     02/04/20  1715   POCGLU 164*       Blood Sugar Average: Last 72 hrs:  (P) 164     Poorly controlled  continue op lantus and start ssi and poc bs  Repeat hgb a1c

## 2020-02-05 NOTE — PLAN OF CARE
Problem: Potential for Falls  Goal: Patient will remain free of falls  Description  INTERVENTIONS:  - Assess patient frequently for physical needs  -  Identify cognitive and physical deficits and behaviors that affect risk of falls    -  [place] fall precautions as indicated by assessment   - Educate patient/family on patient safety including physical limitations  - Instruct patient to call for assistance with activity based on assessment  - Modify environment to reduce risk of injury  - Consider OT/PT consult to assist with strengthening/mobility  Outcome: Progressing     Problem: Prexisting or High Potential for Compromised Skin Integrity  Goal: Skin integrity is maintained or improved  Description  INTERVENTIONS:  - Identify patients at risk for skin breakdown  - Assess and monitor skin integrity  - Assess and monitor nutrition and hydration status  - Monitor labs   - Assess for incontinence   - Turn and reposition patient  - Assist with mobility/ambulation  - Relieve pressure over bony prominences  - Avoid friction and shearing  - Provide appropriate hygiene as needed including keeping skin clean and dry  - Evaluate need for skin moisturizer/barrier cream  - Collaborate with interdisciplinary team   - Patient/family teaching  - Consider wound care consult   Outcome: Progressing     Problem: CARDIOVASCULAR - ADULT  Goal: Maintains optimal cardiac output and hemodynamic stability  Description  INTERVENTIONS:  - Monitor I/O, vital signs and rhythm  - Monitor for S/S and trends of decreased cardiac output  - Administer and titrate ordered vasoactive medications to optimize hemodynamic stability  - Assess quality of pulses, skin color and temperature  - Assess for signs of decreased coronary artery perfusion  - Instruct patient to report change in severity of symptoms  Outcome: Progressing  Goal: Absence of cardiac dysrhythmias or at baseline rhythm  Description  INTERVENTIONS:  - Continuous cardiac monitoring, vital signs, obtain 12 lead EKG if ordered  - Administer antiarrhythmic and heart rate control medications as ordered  - Monitor electrolytes and administer replacement therapy as ordered  Outcome: Progressing     Problem: RESPIRATORY - ADULT  Goal: Achieves optimal ventilation and oxygenation  Description  INTERVENTIONS:  - Assess for changes in respiratory status  - Assess for changes in mentation and behavior  - Position to facilitate oxygenation and minimize respiratory effort  - Oxygen administered by appropriate delivery if ordered  - Initiate smoking cessation education as indicated  - Encourage broncho-pulmonary hygiene including cough, deep breathe, Incentive Spirometry  - Assess the need for suctioning and aspirate as needed  - Assess and instruct to report SOB or any respiratory difficulty  - Respiratory Therapy support as indicated  Outcome: Progressing     Problem: SKIN/TISSUE INTEGRITY - ADULT  Goal: Skin integrity remains intact  Description  INTERVENTIONS  - Identify patients at risk for skin breakdown  - Assess and monitor skin integrity  - Assess and monitor nutrition and hydration status  - Monitor labs (i e  albumin)  - Assess for incontinence   - Turn and reposition patient  - Assist with mobility/ambulation  - Relieve pressure over bony prominences  - Avoid friction and shearing  - Provide appropriate hygiene as needed including keeping skin clean and dry  - Evaluate need for skin moisturizer/barrier cream  - Collaborate with interdisciplinary team (i e  Nutrition, Rehabilitation, etc )   - Patient/family teaching  Outcome: Progressing     Problem: MUSCULOSKELETAL - ADULT  Goal: Maintain or return mobility to safest level of function  Description  INTERVENTIONS:  - Assess patient's ability to carry out ADLs; assess patient's baseline for ADL function and identify physical deficits which impact ability to perform ADLs (bathing, care of mouth/teeth, toileting, grooming, dressing, etc )  - Assess/evaluate cause of self-care deficits   - Assess range of motion  - Assess patient's mobility  - Assess patient's need for assistive devices and provide as appropriate  - Encourage maximum independence but intervene and supervise when necessary  - Involve family in performance of ADLs  - Assess for home care needs following discharge   - Consider OT consult to assist with ADL evaluation and planning for discharge  - Provide patient education as appropriate  Outcome: Progressing     Problem: PAIN - ADULT  Goal: Verbalizes/displays adequate comfort level or baseline comfort level  Description  Interventions:  - Encourage patient to monitor pain and request assistance  - Assess pain using appropriate pain scale  - Administer analgesics based on type and severity of pain and evaluate response  - Implement non-pharmacological measures as appropriate and evaluate response  - Consider cultural and social influences on pain and pain management  - Notify physician/advanced practitioner if interventions unsuccessful or patient reports new pain  Outcome: Progressing     Problem: INFECTION - ADULT  Goal: Absence or prevention of progression during hospitalization  Description  INTERVENTIONS:  - Assess and monitor for signs and symptoms of infection  - Monitor lab/diagnostic results  - Monitor all insertion sites, i e  indwelling lines, tubes, and drains  - Monitor endotracheal if appropriate and nasal secretions for changes in amount and color  - [place] appropriate cooling/warming therapies per order  - Administer medications as ordered  - Instruct and encourage patient and family to use good hand hygiene technique  - Identify and instruct in appropriate isolation precautions for identified infection/condition  Outcome: Progressing     Problem: DISCHARGE PLANNING  Goal: Discharge to home or other facility with appropriate resources  Description  INTERVENTIONS:  - Identify barriers to discharge w/patient and caregiver  - Arrange for needed discharge resources and transportation as appropriate  - Identify discharge learning needs (meds, wound care, etc )  - Arrange for interpretive services to assist at discharge as needed  - Refer to Case Management Department for coordinating discharge planning if the patient needs post-hospital services based on physician/advanced practitioner order or complex needs related to functional status, cognitive ability, or social support system  Outcome: Progressing     Problem: Knowledge Deficit  Goal: Patient/family/caregiver demonstrates understanding of disease process, treatment plan, medications, and discharge instructions  Description  Complete learning assessment and assess knowledge base    Interventions:  - Provide teaching at level of understanding  - Provide teaching via preferred learning methods  Outcome: Progressing

## 2020-02-05 NOTE — H&P
H&P- Gonzales Grounds 1960, 60 y o  male MRN: [id number]    Unit/Bed#: ED 28 Encounter: [id number]    Primary Care Provider: Almaz Bright DO   Date and time admitted to hospital: 2/4/2020  5:18 PM        * PATINO (dyspnea on exertion)  Assessment & Plan  acs r/o  ekg w/ pathologic q wave in inferior leads  No st segment changes in 2+ leads  Given  Poor mobility and subacute presentation over last 2 weeks check cta pe study  Trend ekg and troponins for completeness    PAD (peripheral artery disease) ([facility])  Assessment & Plan  S/p BKA to left leg   -Recent LEADS w/50-75% stenosis in the proximal popliteal artery, and diffuse  atherosclerotic arterial disease throughout the remaining portions of the  femoropopliteal vessels  Pulses difficult to palpate due to LE edema but no claudication at rest or w/exertion  continue statin/plavix      Nodular rash  Assessment & Plan  Of RLE  Painless in patient w/diabetic neuropathy  Possibly due to PAD  There is a somewhat purplish border but this has been chronic over last several months worsening over last 2 weeks  Doubtful pyoderma gangrenosum  Given worsening distribution and atypical presentation will consult dermatology for evaluation and bx given poor ability to get to appointments from mobility issues    Insulin dependent diabetes mellitus [facility])  Assessment & Plan  Lab Results   Component Value Date    HGBA1C 9 5 (A) 06/24/2019       Recent Labs     02/04/20  1715   POCGLU 164*       Blood Sugar Average: Last 72 hrs:  (P) 164     Poorly controlled  continue op lantus and start ssi and poc bs  Repeat hgb a1c    CAD (coronary artery disease)  Assessment & Plan  S/p cabg  No cp  Continue plavix statin  acs r/o for exertional dyspnea as above  Bb held previously due to hx of orthostatic hypotension  Will monitor bp and check orthostatics    If negative, recommend lose dose coreg    Amputated below knee, left [facility])  Assessment & Plan  W/left knee amputation    Chronic diastolic heart failure [facility])  Assessment & Plan  Wt Readings from Last 3 Encounters:   02/04/20 130 kg (285 lb 11 5 oz)   02/04/20 123 kg (272 lb)   10/23/19 119 kg (262 lb)     With worsening RLE edema  bnp not consistent w/ acute diastolic chf  Maintained on lasix 40mg bid  Non compliant w/low salt diet  Continue op lasix 40mg bid for now  Elevate leg low salt diet  Needs op f/u with cardiology          Obesity (BMI 35 0-39 9 without comorbidity)  Assessment & Plan  Encourage weight loss    Diabetic gastroparesis [facility])  Assessment & Plan  Lab Results   Component Value Date    HGBA1C 9 5 (A) 06/24/2019       Recent Labs     02/04/20  1715   POCGLU 164*       Blood Sugar Average: Last 72 hrs:  (P) 164   Continue reglan    Benign essential HTN  Assessment & Plan  Hx of orthostatic hypotension maintained on lasix 40mg bid  Continue op lasix for now    Ambulatory dysfunction    Has been having SOB w/exertion for more than 3-4 steps into his home  He has been having difficulty getting up the stairs as well despite using walker/wheelchair   Consult pt/ot/cm    VTE Prophylaxis: Heparin  / reason for no mechanical VTE prophylaxis RLE rash   Code Status: fc  POLST: There is no POLST form on file for this patient (pre-hospital)  Discussion with family: dispo workup w/pt and wife    Anticipated Length of Stay:  Patient will be admitted on an Observation basis with an anticipated length of stay of  Less than 2 midnights  Justification for Hospital Stay: acs r/o    Total Time for Visit, including Counseling / Coordination of Care: 45 minutes  Greater than 50% of this total time spent on direct patient counseling and coordination of care      Chief Complaint:   Sob w/exertion x 2 weeks    History of Present Illness:    Fidel Hurt is a 60 y o  male who presents with pmh of chronic diastolic chf, cad, iddm w/diabetic neuropathy, htn, hld, pad s/p left BKA chronic RLE edema coming to hospital for worsening swelling over last 2 months and worsening sob x 2 weeks  He was last seen for RLE edema in 08/2019 and underwent  Arterial duplex significant for PAD in RLE with 50-75% stenosis in popliteal and diffuse fempop disease w/tiffanie of 0 95 with metatarsal, and great toe pressure within healing range  He was discharge w/lasix 40mg bid and recommended to see PCP and vascular thereafter  He notes over the last 2 months that he has been having increasing swelling in RLE over 2 months  He has had a rash over RLE that has worsened over last 2 weeks in size and nodularity  It is nonpainful nontender  Over the last 2 weeks he has also had worsening sob w/exertion to the point where he becomes short of breath climbing the 3-4 steps to get into his home  He has no cp/cough n/v/f/c  No sore throat  He notes that he otherwise sits in his recliner or wheelchair all day and is not mobile  This has lead to difficulties getting to his appointments  He also notes he is barely getting up his steps and it is becoming harder to get in and out of the house  He came to his pcp for the above s/sx and was sent to the ED for evaluation  cxr was unremarkable   ekg was stable  We are asked to admit for acs r/o    Review of Systems:    Review of Systems   Constitutional: Positive for fatigue  Negative for appetite change, chills and fever  HENT: Negative for congestion and sore throat  Respiratory: Positive for shortness of breath  Negative for cough  Cardiovascular: Positive for leg swelling  Negative for chest pain and palpitations  Gastrointestinal: Negative for abdominal pain, diarrhea, nausea and vomiting  Musculoskeletal: Positive for gait problem  All other systems reviewed and are negative        Past Medical and Surgical History:     Past Medical History:   Diagnosis Date    Anemia     Anxiety and depression     Autonomic neuropathy     Cataract     Coronary artery disease     Diabetes mellitus ([facility] [address] )     Diabetic autonomic neuropathy total) by mouth daily with dinner 19  Yes Jyotsna Elder DO   glucose blood test strip ReliOn Prime Meter    Historical Provider, MD   docusate sodium (COLACE) 100 mg capsule Take 1 capsule (100 mg total) by mouth 2 (two) times a day  Patient not taking: Reported on 2020  Roderick Vazquez DO   Empagliflozin (JARDIANCE) 10 MG TABS Take 1 tablet (10 mg total) by mouth every morning  Patient not taking: Reported on 2020  Roderick Vazquez DO   furosemide (LASIX) 40 mg tablet Take 1 tablet (40 mg total) by mouth as needed (Edema) 19  Roderick Vazquez DO   LANTUS SOLOSTAR 100 units/mL injection pen INJECT 80 UNITS TOTAL UNDER THE SKIN TWICE A DAY  Patient taking differently: 40 Units 2 (two) times a day  19  Roderick Vazquez DO   midodrine (PROAMATINE) 5 mg tablet Take 5 mg by mouth as needed  3/2/17 2/4/20  Historical Provider, MD     I have reviewed home medications with patient personally  Allergies:    Allergies   Allergen Reactions    Metformin      Allergy listed on nursing home paperwork       Social History:     Marital Status: /Civil Union   Occupation:   Patient Pre-hospital Living Situation:   Patient Pre-hospital Level of Mobility:   Patient Pre-hospital Diet Restrictions:   Substance Use History:   Social History     Substance and Sexual Activity   Alcohol Use No     Social History     Tobacco Use   Smoking Status Former Smoker    Packs/day: 1 00    Years: 12 00    Pack years: 12 00    Types: Cigarettes    Last attempt to quit: 3/23/1994    Years since quittin 8   Smokeless Tobacco Never Used     Social History     Substance and Sexual Activity   Drug Use No       Family History:    Family History   Problem Relation Age of Onset    Atrial fibrillation Mother     Stroke Mother     Hypertension Father     Heart attack Paternal Grandfather 61       Physical Exam:     Vitals:   Blood Pressure: 152/72 (20)  Pulse: 69 (02/04/20 2010)  Temperature: 97 5 °F (36 4 °C) (02/04/20 1708)  Temp Source: Oral (02/04/20 1708)  Respirations: 16 (02/04/20 2010)  Weight - Scale: 130 kg (285 lb 11 5 oz) (02/04/20 1708)  SpO2: 95 % (02/04/20 2010)    Physical Exam   Constitutional: He appears well-developed  No distress  HENT:   Head: Normocephalic and atraumatic  Right Ear: External ear normal    Left Ear: External ear normal    Eyes: Conjunctivae are normal    Cardiovascular: Normal rate, regular rhythm and normal heart sounds  Exam reveals no gallop and no friction rub  No murmur heard  Pulmonary/Chest: Effort normal  No respiratory distress  He has no wheezes  He has no rales  Abdominal: Soft  He exhibits no distension and no mass  There is no tenderness  There is no guarding  Musculoskeletal: He exhibits edema (3+ edema of RLE up to knee)  Neurological: He is alert  Skin: Skin is warm and dry  Rash (rash of RLE is nodular erythematous w/silver plaques  there is somewhat purple border  no pustules or skin breakdown ) noted  He is not diaphoretic  Psychiatric: He has a normal mood and affect  Vitals reviewed  (  Be Sure to Include Physical Exam: Delete this entire line when you have entered your exam)    Additional Data:     Lab Results: I have personally reviewed pertinent reports        Results from last 7 days   Lab Units 02/04/20  1732   WBC Thousand/uL 8 55   HEMOGLOBIN g/dL 13 5   HEMATOCRIT % 42 7   PLATELETS Thousands/uL 239   NEUTROS PCT % 70   LYMPHS PCT % 21   MONOS PCT % 6   EOS PCT % 2     Results from last 7 days   Lab Units 02/04/20  1732   SODIUM mmol/L 139   POTASSIUM mmol/L 4 1   CHLORIDE mmol/L 102   CO2 mmol/L 27   BUN mg/dL 16   CREATININE mg/dL 1 21   ANION GAP mmol/L 10   CALCIUM mg/dL 8 8   ALBUMIN g/dL 3 0*   TOTAL BILIRUBIN mg/dL 0 27   ALK PHOS U/L 58   ALT U/L 33   AST U/L 25   GLUCOSE RANDOM mg/dL 159*     Results from last 7 days   Lab Units 02/04/20  1732   INR  1 01     Results from last 7 days   Lab Units 02/04/20  1715   POC GLUCOSE mg/dl 164*               Imaging: I have personally reviewed pertinent reports  cxr reviewed by myself no vascular congestion or infiltrate  Pending read    XR chest 2 views   ED Interpretation by Jack Jeff DO (02/04 4688)   nad      CTA chest pe study    (Results Pending)   VAS lower limb venous duplex study, unilateral/limited    (Results Pending)       EKG, Pathology, and Other Studies Reviewed on Admission:   · EKG: nsr   q waves in inferior leads  No st segments  Stable 1/avl twi  ·     Allscripts / Epic Records Reviewed: Yes     ** Please Note: This note has been constructed using a voice recognition system   **

## 2020-02-05 NOTE — ASSESSMENT & PLAN NOTE
acs r/o  ekg w/ pathologic q wave in inferior leads  No st segment changes in 2+ leads  Given  Poor mobility and subacute presentation over last 2 weeks check cta pe study  Trend ekg and troponins for completeness

## 2020-02-05 NOTE — PLAN OF CARE
Problem: OCCUPATIONAL THERAPY ADULT  Goal: Performs self-care activities at highest level of function for planned discharge setting  See evaluation for individualized goals  Description  Treatment Interventions: Functional transfer training, ADL retraining, UE strengthening/ROM, Endurance training, Cognitive reorientation, Patient/family training, Equipment evaluation/education, Compensatory technique education, Activityengagement, Energy conservation          See flowsheet documentation for full assessment, interventions and recommendations  Note:   Limitation: Decreased ADL status, Decreased Safe judgement during ADL, Decreased UE strength, Decreased cognition, Decreased endurance, Decreased self-care trans, Decreased high-level ADLs, Decreased sensation, Decreased fine motor control  Prognosis: Good  Assessment: Pt is a 60 y o  male seen for OT evaluation s/p admit to [facility] on 2/4/2020 w/ PATINO (dyspnea on exertion) and worsening edema on R LE  Comorbidities affecting pt's functional performance at time of assessment include: L BKA, PAD, nodular rash, DM, CAD, obesity, CHF, HTN  CTA chest: Small left-sided pleural effusion with overlying compressive atelectasis  Personal factors affecting pt at time of IE include:alone at home during day and sedentary lifestyle  Prior to admission, pt was living w/ spouse and reports independent w/ ADLs w/LHAE, independent w/ functional transfers and mobility w/ RW and w/c, assist w/ IADLs  Upon evaluation: Pt requires MOD assist x2 sit>stand w/ increased time to achieve upright stance, MIN assist stand>sit, MIN assist x2 functional mobility w/ RW and increased time, MOD assist LB ADLs, setup-min assist UB ADLs, MOD assist toileting 2* the following deficits impacting occupational performance: decreased strength and endurance, impaired balance, impaired activity tolerance, impaired sensation in b/l hands and R LE, decreased insight into continuing sedentary lifestyle   Pt educated on b/l UE exercises to complete to increase strength for transfers and pt receptive  Pt to benefit from continued skilled OT tx while in the hospital to address deficits as defined above and maximize level of functional independence w ADL's and functional mobility  Occupational Performance areas to address include: grooming, bathing/shower, toilet hygiene, dressing, health maintenance, functional mobility, clothing management, cleaning and meal prep, UE exercises  From OT standpoint, recommendation at time of d/c would be short term rehab         OT Discharge Recommendation: Short Term Rehab  OT - OK to Discharge: (to rehab when medically stable)

## 2020-02-05 NOTE — ASSESSMENT & PLAN NOTE
Wt Readings from Last 3 Encounters:   02/04/20 130 kg (285 lb 11 5 oz)   02/04/20 123 kg (272 lb)   10/23/19 119 kg (262 lb)     With worsening RLE edema  bnp not consistent w/ acute diastolic chf  Maintained on lasix 40mg bid  Non compliant w/low salt diet  Continue op lasix 40mg bid for now    Elevate leg low salt diet  Needs op f/u with cardiology

## 2020-02-05 NOTE — UTILIZATION REVIEW
Initial Clinical Review    Admission: Date/Time/Statement:    ADMIT OBS  On  2/4  @  1918    Orders Placed This Encounter   Procedures    Place in Observation     Standing Status:   Standing     Number of Occurrences:   1     Order Specific Question:   Admitting Physician     Answer:   Alba Butterfield [[id number]]     Order Specific Question:   Level of Care     Answer:   Med Surg [[id number]]     ED Arrival Information     Expected Arrival Acuity Means of Arrival Escorted By Service Admission Type    2/4/2020 2/4/2020 16:55 Emergent Wheelchair Family Member Hospitalist Emergency    Arrival Complaint    Shortness of breath,Weakness        Chief Complaint   Patient presents with    Shortness of Breath     Pt  was seen at family doctor and sent here for SOB and right leg swelling  Pt  reports generalized weakness  Pt  is SOB during triage while sitting  Assessment/Plan:   61 yo male,  Sent to ER from PCP office,  Admitted OBS status for treatment of increasing dyspnea, RLE edema,  ACS r/o  H/o chronic Diastolic CHF, IDDM w/neuropathy, CAD,  PAD s/p left BKA  Chronic RLE edema (worsening over last 2 mo + nodular rash over last 2 wks)   And worsening SOB over past 2 weeks (becomes SOB climbing the 3-4 steps to get into his home  )   ER bnp not consistent w/ acute diastolic chf - Maintained on lasix 40mg bid  Non compliant w/low salt diet   EXAM - lung sounds clear  Will check chest CT to r/o PE;  Trend troponins;   Given worsening distribution and atypical presentation  Of  rash - will consult dermatology for evaluation and bx           2/5  Tele =  SR        ED Triage Vitals [02/04/20 1708]   Temperature Pulse Respirations Blood Pressure SpO2   97 5 °F (36 4 °C) 70 (!) 26 135/63 95 %      Temp Source Heart Rate Source Patient Position - Orthostatic VS BP Location FiO2 (%)   Oral Monitor Sitting Right arm --      Pain Score       No Pain        Wt Readings from Last 1 Encounters:   02/05/20 124 kg (273 lb 13 oz)     Wt Readings from Last 3 Encounters:   02/04/20 130 kg (285 lb 11 5 oz)   02/04/20 123 kg (272 lb)   10/23/19 119 kg (262 lb)     Additional Vital Signs:   02/04/20 1817    65  16  122/64    96 %  None (Room air)     02/05/20 07:08:27  97 4 °F (36 3 °C)Abnormal   64  18  136/65  89  98 %     02/05/20 15:09:54  97 6 °F   66  18  132/63  86  95 %       Pertinent Labs/Diagnostic Test Results:   2/4  EKG -   pathologic q wave in inferior leads; No st segment changes in 2+ leads    2/4  CTA  Chest Limited study due to streak artifact from patient's body habitus   No evidence of pulmonary embolus  Small left-sided pleural effusion with overlying compressive atelectasis    2/5  cxr -  Stable opacity at the left base best seen on lateral view which may represent a loculated pleural effusion   No new focal consolidation        Results from last 7 days   Lab Units 02/04/20 2107 02/04/20  1732   WBC Thousand/uL  --  8 55   HEMOGLOBIN g/dL  --  13 5   HEMATOCRIT %  --  42 7   PLATELETS Thousands/uL 225 239   NEUTROS ABS Thousands/µL  --  5 91         Results from last 7 days   Lab Units 02/05/20  0520 02/04/20  1732   SODIUM mmol/L 139 139   POTASSIUM mmol/L 3 7 4 1   CHLORIDE mmol/L 102 102   CO2 mmol/L 28 27   ANION GAP mmol/L 9 10   BUN mg/dL 16 16   CREATININE mg/dL 1 06 1 21   EGFR ml/min/1 73sq m 76 65   CALCIUM mg/dL 8 3 8 8     Results from last 7 days   Lab Units 02/04/20  1732   AST U/L 25   ALT U/L 33   ALK PHOS U/L 58   TOTAL PROTEIN g/dL 8 0   ALBUMIN g/dL 3 0*   TOTAL BILIRUBIN mg/dL 0 27     Results from last 7 days   Lab Units 02/05/20  0709 02/05/20  0021 02/04/20  2207 02/04/20  1715   POC GLUCOSE mg/dl 144* 133 89 164*     Results from last 7 days   Lab Units 02/05/20  0520 02/04/20  1732   GLUCOSE RANDOM mg/dL 139 159*     Results from last 7 days   Lab Units 02/04/20  1737 HEMOGLOBIN A1C % 8 5*   EAG mg/dl 197     Results from last 7 days   Lab Units 02/05/20  0028 02/04/20  2107 02/04/20  1732   TROPONIN I ng/mL <0 02 <0 02 <0 02     Results from last 7 days   Lab Units 02/04/20  1732   PROTIME seconds 13 4   INR  1 01   PTT seconds 26     Results from last 7 days   Lab Units 02/04/20  1732   NT-PRO BNP pg/mL 366*     Past Medical History:   Diagnosis Date    Anemia     Anxiety and depression     Autonomic neuropathy     Cataract     Coronary artery disease     [facility]     Diabetic autonomic neuropathy associated with type 2 diabetes mellitus ([facility] )     Last Assessed: 12/28/2017    Gastroparesis due to DM ([facility])     History of DVT (deep vein thrombosis)     left LE    HTN (hypertension)     Hyperlipidemia     Non healing left heel wound     Obesity     Orthostatic hypotension      Present on Admission:   Amputated below knee, left (HCC)   Anxiety and depression   Benign essential HTN   CAD (coronary artery disease)   Obesity (BMI 35 0-39 9 without comorbidity)   Diabetic gastroparesis ([facility])   PAD (peripheral artery disease) ([facility])   Chronic diastolic heart failure ([facility])    Admitting Diagnosis: Shortness of breath [R06 02]  Rash [R21]  Dyspnea on exertion [R06 09]  Age/Sex: 60 y o  male  Admission Orders:   Telemetry;  Trend trops;  Repeat EKG in am;    PT/OT evals;  Continue home lasix dosage for now;  Consult dermatology; Ck orthostatics;   Elevate RLE, lo salt diet (fup w/cardiology outpatient) continue op lantus and start ssi and poc bs  Repeat hgb a1c    Scheduled Medications:    Medications:  atorvastatin 80 mg Oral Daily With Dinner   clopidogrel 75 mg Oral Daily   finasteride 5 mg Oral Daily   furosemide 40 mg Oral BID (diuretic)   heparin (porcine) 5,000 Units Subcutaneous Q8H [facility] & assisted   insulin glargine 40 Units Subcutaneous BID   insulin lispro 1-5 Units Subcutaneous 4x Daily (AC & HS)   metoclopramide 5 mg Oral Daily   pantoprazole 40 mg Oral Early Morning   tamsulosin 0 4 mg Oral Daily With Dinner     Continuous IV Infusions:     PRN Meds:    ondansetron 4 mg Intravenous Q6H PRN     Network Utilization Review Department  [email or web address]  ATTENTION: Please call with any questions or concerns to [phone] and carefully listen to the prompts so that you are directed to the right person  All voicemails are confidential   Marybel Pierre all requests for admission clinical reviews, approved or denied determinations and any other requests to dedicated fax number below belonging to the campus where the patient is receiving treatment   List of dedicated fax numbers for the Facilities:  [address] DENIALS (Administrative/Medical Necessity) [phone]   [address] (Maternity/NICU/Pediatrics) [phone]   Ernesto Godfrey [phone]   [facility] [phone]   Steve Farias [phone]   Tan Gladis [phone]   1205 [facility] [address] [place] [address] [phone]   [facility]  [phone]   [address] [facility]  24[address] [facility] 1000 [address] [facility] [phone]

## 2020-02-05 NOTE — OCCUPATIONAL THERAPY NOTE
[address]  Evaluation     Patient Name: Bob Carlson  Today's Date: 2/5/2020  Problem List  Principal Problem:    PATINO (dyspnea on exertion)  Active Problems:    Anxiety and depression    Benign essential HTN    PAD (peripheral artery disease) (HCC)    Diabetic gastroparesis (HCC)    Obesity (BMI 35 0-39 9 without comorbidity)    Chronic diastolic heart failure (HCC)    Amputated below knee, left (HCC)    CAD (coronary artery disease)    Insulin dependent diabetes mellitus ([facility] )    Nodular rash    Past Medical History  Past Medical History:   Diagnosis Date    Anemia     Anxiety and depression     Autonomic neuropathy     Cataract     Coronary artery disease     Diabetes mellitus ([facility] )     Diabetic autonomic neuropathy associated with type 2 diabetes mellitus ([facility] )     Last Assessed: 12/28/2017    Gastroparesis due to DM ([facility] )     History of DVT (deep vein thrombosis)     left LE    HTN (hypertension)     Hyperlipidemia     Non healing left heel wound     Obesity     Orthostatic hypotension      Past Surgical History  Past Surgical History:   Procedure Laterality Date    LEG AMPUTATION THROUGH LOWER TIBIA AND FIBULA Left 8/3/2018    Procedure: AMPUTATION BELOW KNEE (BKA) L BKA;  Surgeon: Audra Varela MD;  [facility];  Service: Vascular    MT CABG, ARTERY-VEIN, FOUR N/A 3/28/2018    Procedure: CORONARY ARTERY BYPASS GRAFT (CABG) x3 VESSELS, LIMA TO LAD, SVG--> PDA, SVG--> OM2,  RIGHT LEG EVH/SVH TO , INTRA-OP AMANDEEP;  Surgeon: Alex Hargrove MD;  [facility];  Service: Cardiac Surgery    ROTATOR CUFF REPAIR Bilateral              02/05/20 1218   Note Type   Note type Eval/Treat   Restrictions/Precautions   Weight Bearing Precautions Per Order No   Braces or Orthoses Prosthesis   Other Precautions Fall Risk  ([place], L BKA)   Pain Assessment   Pain Assessment No/denies pain   Pain Score No Pain   Home Living   Type of [address] Two level;Stairs to enter with rails  (4 VITOR)   Bathroom Shower/Tub Tub/shower unit  (sponge bathes in 1/2 bath, tub upstairs)   Bathroom Toilet Standard   Bathroom Equipment Commode   [facility]; Wheelchair-manual;Reacher;Sock aid;Long-handled shoehorn  (LH sponge)   Additional Comments pt stays on 1st floor setup, wife works 2 jobs and pt home alone   Prior Function   Level of [place] Independent with ADLs and functional mobility   Lives With [employer] Help From Family   ADL Assistance Independent   IADLs Needs assistance   Vocational On disability   Comments pt reports use of RW and w/c in the home    Lifestyle   Autonomy per pt independent w/ ADLs w/ LHAE, independent w/ functional transfers and mobility w/ RW or w/c, assist w/    Reciprocal Relationships spouse and son   Service to Others on disability   Intrinsic Gratification watch tv   Subjective   Subjective "I just want a second chance at rehab at [facility]"   ADL   Where [address] 5  Supervision/Setup   Grooming Assistance 5  Supervision/Setup   UB Bathing Assistance 5  Supervision/Setup   LB Bathing Assistance 3  Moderate Assistance   UB Dressing Assistance 5  Supervision/Setup    [facility] 3  Moderate [address]  3  Moderate Assistance   Bed Mobility   Supine to Sit Unable to assess   Additional Comments pt seated in bedside recliner pre/post session   Transfers   Sit to Stand 3  Moderate assistance   Additional items Assist x 2; Increased time required;Verbal cues;Armrests  (increased time to achieve upright positioning)   Stand to Sit 4  Minimal assistance   Additional items Increased time required;Verbal cues;Armrests;Assist x 1;Assist x 2   Additional Comments cues for positioning   Functional Mobility   Functional Mobility 4  Minimal assistance   Additional Comments assist x2 w/ increased time to complete and cues for safety    Additional items Rolling walker   Balance   Static Sitting Fair +   Dynamic Sitting Fair   Static Standing Fair -   Dynamic Standing Poor +   Ambulatory Poor +   Activity Tolerance   Activity Tolerance Patient limited by fatigue   Nurse Made Aware appropriate to see per zachary R[place] Assessment   RUE Assessment WFL  (4-/5)   LUE Assessment   LUE Assessment WFL  (4-/5)   Hand Function   Gross Motor Coordination Functional   Fine Motor Coordination Functional   Sensation   Light Touch Severe deficits in the RUE;Severe deficits in the RLE   Additional Comments reports decreased "feeling" in hands, dropping items at home   Proprioception   Proprioception No apparent deficits   Vision-Basic Assessment   Current Vision No visual deficits   Vision - Complex Assessment   Ocular Range of Motion [facility]   Acuity Able to read clock/calendar on wall without difficulty   Perception   Inattention/Neglect Appears intact   Cognition   Overall Cognitive Status [facility]   Arousal/Participation Alert; Cooperative   Attention Within functional limits   Orientation Level Oriented X4   Memory Decreased recall of precautions   Following Commands Follows one step commands without difficulty   Comments engages in appropriate conversations, decreased insight into limitations and sedentary lifestyle at home resulting in deconditioning   Assessment   Limitation Decreased ADL status; Decreased Safe judgement during ADL;Decreased UE strength;Decreased cognition;Decreased endurance;Decreased self-care trans;Decreased high-level ADLs; Decreased sensation;Decreased fine motor control   Prognosis Good   Assessment Pt is a 60 y o  male seen for OT evaluation s/p admit to [facility] on 2/4/2020 w/ PATINO (dyspnea on exertion) and worsening edema on R LE  Comorbidities affecting pt's functional performance at time of assessment include: L BKA, PAD, nodular rash, DM, CAD, obesity, CHF, HTN  CTA chest: Small left-sided pleural effusion with overlying compressive atelectasis    Personal factors affecting pt at time of IE include:alone at home during day and sedentary lifestyle  Prior to admission, pt was living w/ spouse and reports independent w/ ADLs w/LHAE, independent w/ functional transfers and mobility w/ RW and w/c, assist w/ IADLs  Upon evaluation: Pt requires MOD assist x2 sit>stand w/ increased time to achieve upright stance, MIN assist stand>sit, MIN assist x2 functional mobility w/ RW and increased time, MOD assist LB ADLs, setup-min assist UB ADLs, MOD assist toileting 2* the following deficits impacting occupational performance: decreased strength and endurance, impaired balance, impaired activity tolerance, impaired sensation in b/l hands and R LE, decreased insight into continuing sedentary lifestyle  Pt educated on b/l UE exercises to complete to increase strength for transfers and pt receptive  Pt to benefit from continued skilled OT tx while in the hospital to address deficits as defined above and maximize level of functional independence w ADL's and functional mobility  Occupational Performance areas to address include: grooming, bathing/shower, toilet hygiene, dressing, health maintenance, functional mobility, clothing management, cleaning and meal prep, UE exercises  From OT standpoint, recommendation at time of d/c would be short term rehab  Goals   Patient Goals "to get stronger and go to rehab"   LTG Time Frame 10-14   Long Term Goal please see below goals   Plan   Treatment Interventions Functional transfer training;ADL retraining;UE strengthening/ROM; Endurance training;Cognitive reorientation;Patient/family training;Equipment evaluation/education; Compensatory technique education; Activityengagement; Energy conservation   Goal Expiration Date 02/19/20   OT Frequency 3-5x/wk   Recommendation   OT Discharge Recommendation Short Term Rehab   OT - OK to Discharge   (to rehab when medically stable)   Barthel Index   Feeding 10   Bathing 0   Grooming Score 5   Dressing Score 5 Bladder Score 10   Bowels Score 10   Toilet Use Score 5   Transfers (Bed/Chair) Score 5   Mobility (Level Surface)   Score 0   Stairs Score 0   Barthel Index Score 50   Modified [place]ana Scale   Modified [place] Scale 4     Occupational Therapy Goals to be met in 10-14 days:  1) Pt will improve activity tolerance to G for 30 min txment sessions to enhance ADLs  2) Pt will complete ADLs/self care w/ mod I w/ LHAE prn  3) Pt will complete toileting w/ mod I w/ G hygiene/thoroughness using DME PRN  4) Pt will improve functional transfers on/off all surfaces using DME PRN w/ G balance/safety including toileting w/ supervision  5) Pt will improve fx'l mobility during I/ADl/leisure tasks using DME PRN w/ g balance/safety w/ supervision  6) Pt will engage in ongoing cognitive assessment w/ G participation to A w/ safe d/c planning/recommendations  7) Pt will demonstrate G carryover of pt/caregiver education and training as appropriate w/ mod I  w/ G tolerance  8) Pt will engage in depression screen/leisure interest checklist w/ G participation to monitor s/s depression and ID 3 positive coping strategies to A w/ emotional regulation and management  9) Pt will demonstrate 100% carryover of E C  techniques w/ mod I t/o fx'l I/ADL/leisure tasks w/o cues s/p skilled education  10) Pt will demonstrate improved bed mobility to supervision to enhance ADLs  11) Pt will demonstrate 100% carryover of LHAE for LB ADLs/self care and leisure s/p skilled education w/ mod I and G participation  12) Pt will demonstrate improved standing tolerance to 3-5 minutes during functional tasks w/ Fair + dynamic standing balance to enhance ADL performance  13) Pt will demonstrate improved b/l UE strength by 1 MMT grade and demonstrate HEP at MOD I level to enhance ADLS and functional transfers  14) Pt will demonstrate and recall self-monitoring techniques for CHF (such as daily weights on scale, reading scale, modified diets) at MOD I level s/p education and training to enhance health maintenance routines at home     Documentation completed by: Devorah Harris MS, OTR/L

## 2020-02-05 NOTE — ASSESSMENT & PLAN NOTE
S/p BKA to left leg   -Recent LEADS w/50-75% stenosis in the proximal popliteal artery, and diffuse  atherosclerotic arterial disease throughout the remaining portions of the  femoropopliteal vessels  Pulses difficult to palpate due to LE edema but no claudication at rest or w/exertion  continue statin/plavix

## 2020-02-05 NOTE — ASSESSMENT & PLAN NOTE
Of RLE  Painless in patient w/diabetic neuropathy  Possibly due to PAD  There is a somewhat purplish border but this has been chronic over last several months worsening over last 2 weeks    Doubtful pyoderma gangrenosum  Given worsening distribution and atypical presentation will consult dermatology for evaluation and bx given poor ability to get to appointments from mobility issues

## 2020-02-05 NOTE — ASSESSMENT & PLAN NOTE
S/p cabg  No cp  Continue plavix statin  acs r/o for exertional dyspnea as above  Bb held previously due to hx of orthostatic hypotension  Will monitor bp and check orthostatics    If negative, recommend lose dose coreg

## 2020-02-05 NOTE — PROGRESS NOTES
Progress Note - Norbert Patterson 60 y o  male MRN: [id number]    Unit/Bed#: [address] 2 -02 Encounter: [id number]      Subjective: The patient feels reasonably well at rest   He describes progressive dyspnea on exertion over the past 1 month  He thinks that some of this is related to deconditioning resultant from his recent leg amputation  However, he does have coronary artery disease with previous bypass surgery  He has had occasional chest discomfort but this is not predictable  He has some swelling of his right leg  He denies orthopnea or PND  Physical Exam:   Temp:  [97 4 °F (36 3 °C)-97 6 °F (36 4 °C)] 97 6 °F (36 4 °C)  HR:  [64-75] 66  Resp:  [16-26] 18  BP: (122-154)/(63-72) 132/63    Gen:  Well-developed, obese, in no distress  Neck:  Supple  No lymphadenopathy, goiter, or bruit  Heart:  Regular rhythm  I could hear no murmur, gallop, or rub  Lungs:  Clear to auscultation and percussion  No wheezing, rales, or rhonchi    Abd:  Soft with active bowel sounds  No mass, tenderness, or organomegaly  Extremities:  +2 edema of the right leg  Left BKA  Neuro:  Alert and oriented  No focal sign  Skin:  Warm and dry      LABS:   CBC:   Lab Results   Component Value Date    WBC 8 55 02/04/2020    HGB 13 5 02/04/2020    HCT 42 7 02/04/2020    MCV 87 02/04/2020     02/04/2020    MCH 27 6 02/04/2020    MCHC 31 6 02/04/2020    RDW 13 9 02/04/2020    MPV 9 6 02/04/2020    NRBC 0 02/04/2020   , CMP:   Lab Results   Component Value Date    SODIUM 139 02/05/2020    K 3 7 02/05/2020     02/05/2020    CO2 28 02/05/2020    BUN 16 02/05/2020    CREATININE 1 06 02/05/2020    CALCIUM 8 3 02/05/2020    AST 25 02/04/2020    ALT 33 02/04/2020    ALKPHOS 58 02/04/2020    EGFR 76 02/05/2020         Assessment/Plan:  1  Progressive dyspnea on exertion, rule out myocardial ischemia  2  Coronary artery disease  3  Type 2 diabetes with diabetic neuropathy  4  Peripheral vascular disease  5   Status post left BKA Considering the patient's past medical history, I find his current symptoms most worrisome  Thus far, evaluation has been unrevealing  However, with all factors considered, I believe that it would be most appropriate to have the patient undergo stress testing before deciding on further care  Cardiology evaluation has also been requested      VTE Pharmacologic Prophylaxis: Heparin  VTE Mechanical Prophylaxis: reason for no mechanical VTE prophylaxis Left BKA, right leg rash

## 2020-02-06 ENCOUNTER — APPOINTMENT (INPATIENT)
Dept: NON INVASIVE DIAGNOSTICS | Facility: HOSPITAL | Age: 60
DRG: 292 | End: 2020-02-06
Payer: COMMERCIAL

## 2020-02-06 ENCOUNTER — APPOINTMENT (INPATIENT)
Dept: NUCLEAR MEDICINE | Facility: HOSPITAL | Age: 60
DRG: 292 | End: 2020-02-06
Payer: COMMERCIAL

## 2020-02-06 LAB
GLUCOSE SERPL-MCNC: 144 MG/DL (ref 65–140)
GLUCOSE SERPL-MCNC: 185 MG/DL (ref 65–140)
GLUCOSE SERPL-MCNC: 245 MG/DL (ref 65–140)
GLUCOSE SERPL-MCNC: 282 MG/DL (ref 65–140)
T4 SERPL-MCNC: 7.4 UG/DL (ref 4.7–13.3)

## 2020-02-06 PROCEDURE — 78451 HT MUSCLE IMAGE SPECT SING: CPT

## 2020-02-06 PROCEDURE — 93306 TTE W/DOPPLER COMPLETE: CPT

## 2020-02-06 PROCEDURE — 99255 IP/OBS CONSLTJ NEW/EST HI 80: CPT | Performed by: INTERNAL MEDICINE

## 2020-02-06 PROCEDURE — 82948 REAGENT STRIP/BLOOD GLUCOSE: CPT

## 2020-02-06 PROCEDURE — A9502 TC99M TETROFOSMIN: HCPCS

## 2020-02-06 PROCEDURE — 99232 SBSQ HOSP IP/OBS MODERATE 35: CPT | Performed by: INTERNAL MEDICINE

## 2020-02-06 RX ORDER — INSULIN GLARGINE 100 [IU]/ML
44 INJECTION, SOLUTION SUBCUTANEOUS 2 TIMES DAILY
Status: DISCONTINUED | OUTPATIENT
Start: 2020-02-07 | End: 2020-02-11 | Stop reason: HOSPADM

## 2020-02-06 RX ORDER — POTASSIUM CHLORIDE 20 MEQ/1
20 TABLET, EXTENDED RELEASE ORAL DAILY
Status: DISCONTINUED | OUTPATIENT
Start: 2020-02-06 | End: 2020-02-11 | Stop reason: HOSPADM

## 2020-02-06 RX ORDER — FUROSEMIDE 10 MG/ML
40 INJECTION INTRAMUSCULAR; INTRAVENOUS
Status: DISCONTINUED | OUTPATIENT
Start: 2020-02-06 | End: 2020-02-07

## 2020-02-06 RX ADMIN — HEPARIN SODIUM 5000 UNITS: 5000 INJECTION INTRAVENOUS; SUBCUTANEOUS at 14:30

## 2020-02-06 RX ADMIN — INSULIN LISPRO 3 UNITS: 100 INJECTION, SOLUTION INTRAVENOUS; SUBCUTANEOUS at 11:52

## 2020-02-06 RX ADMIN — POTASSIUM CHLORIDE 20 MEQ: 1500 TABLET, EXTENDED RELEASE ORAL at 10:12

## 2020-02-06 RX ADMIN — CLOPIDOGREL 75 MG: 75 TABLET, FILM COATED ORAL at 10:11

## 2020-02-06 RX ADMIN — HEPARIN SODIUM 5000 UNITS: 5000 INJECTION INTRAVENOUS; SUBCUTANEOUS at 21:55

## 2020-02-06 RX ADMIN — FUROSEMIDE 40 MG: 10 INJECTION, SOLUTION INTRAMUSCULAR; INTRAVENOUS at 16:36

## 2020-02-06 RX ADMIN — INSULIN LISPRO 2 UNITS: 100 INJECTION, SOLUTION INTRAVENOUS; SUBCUTANEOUS at 21:55

## 2020-02-06 RX ADMIN — ATORVASTATIN CALCIUM 80 MG: 80 TABLET, FILM COATED ORAL at 16:36

## 2020-02-06 RX ADMIN — PANTOPRAZOLE SODIUM 40 MG: 40 TABLET, DELAYED RELEASE ORAL at 06:30

## 2020-02-06 RX ADMIN — INSULIN LISPRO 1 UNITS: 100 INJECTION, SOLUTION INTRAVENOUS; SUBCUTANEOUS at 16:39

## 2020-02-06 RX ADMIN — FINASTERIDE 5 MG: 5 TABLET, FILM COATED ORAL at 10:11

## 2020-02-06 RX ADMIN — INSULIN GLARGINE 40 UNITS: 100 INJECTION, SOLUTION SUBCUTANEOUS at 16:37

## 2020-02-06 RX ADMIN — PERFLUTREN 1.4 ML/MIN: 6.52 INJECTION, SUSPENSION INTRAVENOUS at 14:30

## 2020-02-06 RX ADMIN — TAMSULOSIN HYDROCHLORIDE 0.4 MG: 0.4 CAPSULE ORAL at 16:36

## 2020-02-06 RX ADMIN — HEPARIN SODIUM 5000 UNITS: 5000 INJECTION INTRAVENOUS; SUBCUTANEOUS at 06:30

## 2020-02-06 RX ADMIN — METOCLOPRAMIDE HYDROCHLORIDE 5 MG: 10 TABLET ORAL at 10:12

## 2020-02-06 RX ADMIN — TRIAMCINOLONE ACETONIDE: 1 CREAM TOPICAL at 16:41

## 2020-02-06 RX ADMIN — TRIAMCINOLONE ACETONIDE: 1 CREAM TOPICAL at 10:15

## 2020-02-06 RX ADMIN — FUROSEMIDE 40 MG: 10 INJECTION, SOLUTION INTRAMUSCULAR; INTRAVENOUS at 10:12

## 2020-02-06 RX ADMIN — INSULIN GLARGINE 40 UNITS: 100 INJECTION, SOLUTION SUBCUTANEOUS at 10:12

## 2020-02-06 NOTE — PLAN OF CARE
Problem: Potential for Falls  Goal: Patient will remain free of falls  Description  INTERVENTIONS:  - Assess patient frequently for physical needs  -  Identify cognitive and physical deficits and behaviors that affect risk of falls    -  [place] fall precautions as indicated by assessment   - Educate patient/family on patient safety including physical limitations  - Instruct patient to call for assistance with activity based on assessment  - Modify environment to reduce risk of injury  - Consider OT/PT consult to assist with strengthening/mobility  Outcome: Progressing     Problem: Prexisting or High Potential for Compromised Skin Integrity  Goal: Skin integrity is maintained or improved  Description  INTERVENTIONS:  - Identify patients at risk for skin breakdown  - Assess and monitor skin integrity  - Assess and monitor nutrition and hydration status  - Monitor labs   - Assess for incontinence   - Turn and reposition patient  - Assist with mobility/ambulation  - Relieve pressure over bony prominences  - Avoid friction and shearing  - Provide appropriate hygiene as needed including keeping skin clean and dry  - Evaluate need for skin moisturizer/barrier cream  - Collaborate with interdisciplinary team   - Patient/family teaching  - Consider wound care consult   Outcome: Progressing     Problem: CARDIOVASCULAR - ADULT  Goal: Maintains optimal cardiac output and hemodynamic stability  Description  INTERVENTIONS:  - Monitor I/O, vital signs and rhythm  - Monitor for S/S and trends of decreased cardiac output  - Administer and titrate ordered vasoactive medications to optimize hemodynamic stability  - Assess quality of pulses, skin color and temperature  - Assess for signs of decreased coronary artery perfusion  - Instruct patient to report change in severity of symptoms  Outcome: Progressing  Goal: Absence of cardiac dysrhythmias or at baseline rhythm  Description  INTERVENTIONS:  - Continuous cardiac monitoring, vital signs, obtain 12 lead EKG if ordered  - Administer antiarrhythmic and heart rate control medications as ordered  - Monitor electrolytes and administer replacement therapy as ordered  Outcome: Progressing     Problem: RESPIRATORY - ADULT  Goal: Achieves optimal ventilation and oxygenation  Description  INTERVENTIONS:  - Assess for changes in respiratory status  - Assess for changes in mentation and behavior  - Position to facilitate oxygenation and minimize respiratory effort  - Oxygen administered by appropriate delivery if ordered  - Initiate smoking cessation education as indicated  - Encourage broncho-pulmonary hygiene including cough, deep breathe, Incentive Spirometry  - Assess the need for suctioning and aspirate as needed  - Assess and instruct to report SOB or any respiratory difficulty  - Respiratory Therapy support as indicated  Outcome: Progressing     Problem: SKIN/TISSUE INTEGRITY - ADULT  Goal: Skin integrity remains intact  Description  INTERVENTIONS  - Identify patients at risk for skin breakdown  - Assess and monitor skin integrity  - Assess and monitor nutrition and hydration status  - Monitor labs (i e  albumin)  - Assess for incontinence   - Turn and reposition patient  - Assist with mobility/ambulation  - Relieve pressure over bony prominences  - Avoid friction and shearing  - Provide appropriate hygiene as needed including keeping skin clean and dry  - Evaluate need for skin moisturizer/barrier cream  - Collaborate with interdisciplinary team (i e  Nutrition, Rehabilitation, etc )   - Patient/family teaching  Outcome: Progressing     Problem: MUSCULOSKELETAL - ADULT  Goal: Maintain or return mobility to safest level of function  Description  INTERVENTIONS:  - Assess patient's ability to carry out ADLs; assess patient's baseline for ADL function and identify physical deficits which impact ability to perform ADLs (bathing, care of mouth/teeth, toileting, grooming, dressing, etc )  - Assess/evaluate cause of self-care deficits   - Assess range of motion  - Assess patient's mobility  - Assess patient's need for assistive devices and provide as appropriate  - Encourage maximum independence but intervene and supervise when necessary  - Involve family in performance of ADLs  - Assess for home care needs following discharge   - Consider OT consult to assist with ADL evaluation and planning for discharge  - Provide patient education as appropriate  Outcome: Progressing     Problem: PAIN - ADULT  Goal: Verbalizes/displays adequate comfort level or baseline comfort level  Description  Interventions:  - Encourage patient to monitor pain and request assistance  - Assess pain using appropriate pain scale  - Administer analgesics based on type and severity of pain and evaluate response  - Implement non-pharmacological measures as appropriate and evaluate response  - Consider cultural and social influences on pain and pain management  - Notify physician/advanced practitioner if interventions unsuccessful or patient reports new pain  Outcome: Progressing     Problem: INFECTION - ADULT  Goal: Absence or prevention of progression during hospitalization  Description  INTERVENTIONS:  - Assess and monitor for signs and symptoms of infection  - Monitor lab/diagnostic results  - Monitor all insertion sites, i e  indwelling lines, tubes, and drains  - Monitor endotracheal if appropriate and nasal secretions for changes in amount and color  - [place] appropriate cooling/warming therapies per order  - Administer medications as ordered  - Instruct and encourage patient and family to use good hand hygiene technique  - Identify and instruct in appropriate isolation precautions for identified infection/condition  Outcome: Progressing     Problem: DISCHARGE PLANNING  Goal: Discharge to home or other facility with appropriate resources  Description  INTERVENTIONS:  - Identify barriers to discharge w/patient and caregiver  - Arrange for needed discharge resources and transportation as appropriate  - Identify discharge learning needs (meds, wound care, etc )  - Arrange for interpretive services to assist at discharge as needed  - Refer to Case Management Department for coordinating discharge planning if the patient needs post-hospital services based on physician/advanced practitioner order or complex needs related to functional status, cognitive ability, or social support system  Outcome: Progressing     Problem: Knowledge Deficit  Goal: Patient/family/caregiver demonstrates understanding of disease process, treatment plan, medications, and discharge instructions  Description  Complete learning assessment and assess knowledge base  Interventions:  - Provide teaching at level of understanding  - Provide teaching via preferred learning methods  Outcome: Progressing     Problem: Nutrition/Hydration-ADULT  Goal: Nutrient/Hydration intake appropriate for improving, restoring or maintaining nutritional needs  Description  Monitor and assess patient's nutrition/hydration status for malnutrition  Collaborate with interdisciplinary team and initiate plan and interventions as ordered  Monitor patient's weight and dietary intake as ordered or per policy  Utilize nutrition screening tool and intervene as necessary  Determine patient's food preferences and provide high-protein, high-caloric foods as appropriate       INTERVENTIONS:  - Monitor oral intake, urinary output, labs, and treatment plans  - Assess nutrition and hydration status and recommend course of action  - Evaluate amount of meals eaten  - Assist patient with eating if necessary   - Allow adequate time for meals  - Recommend/ encourage appropriate diets, oral nutritional supplements, and vitamin/mineral supplements  - Order, calculate, and assess calorie counts as needed  - Recommend, monitor, and adjust tube feedings and TPN/PPN based on assessed needs  - Assess need for intravenous fluids  - Provide specific nutrition/hydration education as appropriate  - Include patient/family/caregiver in decisions related to nutrition  Outcome: Progressing

## 2020-02-06 NOTE — CONSULTS
Cardiology Consult  02/06/20    Referring Physian: MD OLGA JeromeIM    Chief Complain/Reason for Referal:     IMPRESSION/RECOMMENDATIONS/DISCUSSION:  1  Acute on chronic diastolic heart failure, predominantly right-sided  2  CAD status post CABG x3, 03/2018 with LIMA to LAD, SVG to circumflex, SVG to RCA  3  Diabetes  4  Peripheral arterial disease, prior left BKA  5  Dyslipidemia  6  Orthostatic hypotension      · Suspect right lower extremity edema, increased exertional dyspnea, small left pleural effusion in the setting of significant sodium use suggestive of volume overload and congestive heart failure  He has edema all the way up to his right thighs  His NT proBNP is likely low secondary to his obesity  Start furosemide 40 mg IV b i d  With KDUR 20 meq daily (K 3 7 yesterday)  Follow daily weights, electrolytes, clinical exam and progress, renal function  · Check echocardiogram to ensure stable renal function and rule out new valvular disease  · No symptoms of angina  He was unable to tolerate his stress test this morning due to inability to lay down flat  · Continue high-dose atorvastatin  No recent lipid panel, will order tomorrow to ensure adequate control      ======================================================    HPI:  I am seeing this patient in cardiology consultation for:  Dyspnea    Sarah Glass is a 60 y o  male with a history of CAD status post CABG x3 03/2018 with LIMA to LAD, SVG to circumflex, SVG to RCA  He also has a history of chronic diastolic heart failure, diabetes, peripheral arterial disease, orthostatic hypotension, and prior left BKA secondary to nonhealing wound  He presents hospital with complaints of exertional dyspnea for 1 month and increased right lower extremity edema for 2 months  ProBNP has been in the 300s, with CTA chest ruling out pulmonary embolism and revealing small left-sided pleural effusion    Lower extremity venous Dopplers were within normal limits  He denies exertional chest discomfort  He admits to significant sodium use  He eats fast food about 3 times a week, and does not restrict his sodium even with home cooked meals  He has chronic orthopnea for about a year  He denies palpitations  Past Medical History:   Diagnosis Date    Anemia     Anxiety and depression     Autonomic neuropathy     Cataract     Coronary artery disease     [facility] mellitus [facility]     Diabetic autonomic neuropathy associated with type 2 diabetes mellitus (HCC)     Last Assessed: 12/28/2017    [facility]rop[facility] DM [facility])     History of DVT (deep vein thrombosis)     left LE    HTN (hypertension)     Hyperlipidemia     Non healing left heel wound     Obesity     Orthostatic hypotension          Scheduled Meds:  Current Facility-Administered Medications:  atorvastatin 80 mg Oral Daily With NUNO Michaels   clopidogrel 75 mg Oral Daily Jennifer Boles PA-C   finasteride 5 mg Oral Daily Jennifer Boles PA-C   furosemide 40 mg Oral BID (diuretic) Jennifer Boles PA-C   heparin (porcine) 5,000 Units Subcutaneous Q8H [facility] Jennifer Boles PA-C   insulin glargine 40 Units Subcutaneous BID Jennifer Boles PA-C   insulin lispro 1-5 Units Subcutaneous 4x Daily (AC & HS) Jennifer Boles PA-C   metoclopramide 5 mg Oral Daily Jennifer Boles PA-C   ondansetron 4 mg Intravenous Q6H PRN Jennifer Boles PA-C   pantoprazole 40 mg Oral Early Morning Jennifer Boles PA-C   tamsulosin 0 4 mg Oral Daily With Patti Boles PA-C   triamcinolone  Topical BID Linda Jackson DO     Continuous Infusions:   PRN Meds: ondansetron  Allergies   Allergen Reactions    Metformin      Allergy listed on nursing home paperwork     I reviewed the Home Medication list in the chart       Family History   Problem Relation Age of Onset    Atrial fibrillation Mother     Stroke Mother     Hypertension Father     Heart attack Paternal Grandfather 61       Social History     Socioeconomic History    Marital status: /Civil Union     Spouse name: Not on file    Number of children: Not on file    Years of education: Not on file    Highest education level: Not on file   Occupational History    Not on file   Social Needs    Financial resource strain: Not on file    Food insecurity:     Worry: Not on file     Inability: Not on file    Transportation needs:     Medical: Not on file     Non-medical: Not on file   Tobacco Use    Smoking status: Former Smoker     Packs/day: 1 00     Years: 12 00     Pack years: 12 00     Types: Cigarettes     Last attempt to quit: 3/23/1994     Years since quittin 8    Smokeless tobacco: Never Used   Substance and Sexual Activity    Alcohol use: No    Drug use: No    Sexual activity: Not Currently   Lifestyle    Physical activity:     Days per week: Not on file     Minutes per session: Not on file    Stress: Not on file   Relationships    Social connections:     Talks on phone: Not on file     Gets together: Not on file     Attends Zoroastrian service: Not on file     Active member of club or organization: Not on file     Attends meetings of clubs or organizations: Not on file     Relationship status: Not on file    Intimate partner violence:     Fear of current or ex partner: Not on file     Emotionally abused: Not on file     Physically abused: Not on file     Forced sexual activity: Not on file   Other Topics Concern    Not on file   Social History Narrative    Not on file       Review of Systems - as per HPI, all others reviewed and negative  Vitals:    20 0709   BP: 137/67   Pulse: 68   Resp: 20   Temp: 97 7 °F (36 5 °C)   SpO2: 97%     I/O       701 -  07 -  07 -  07    Urine (mL/kg/hr) 300      Total Output 300      Net -300                 Weight (last 2 days)     Date/Time   Weight    20 06   124 (273 37)    20 06 124 (273 81)    02/04/20 2101   124 (273 37)    02/04/20 1708   130 (285 72)              GEN: NAD, Alert  HEENT: Mucus membranes moist, pink conjunctivae  EYES: Pupils equal, sclera anicteric  NECK: No JVD/HJR, no carotid bruit  CARDIOVASCULAR: RRR, No murmur, rub, gallops S1,S2, no parasternal heave/thrill  LUNGS: Clear To auscultation bilaterally  ABDOMEN: Soft, nondistended, no hepatic systolic pulsation  EXTREMITIES/VASCULAR: No edema    PSYCH: Normal Affect by limited examination  NEURO: Grossly intact by limited examination    HEME: No significant bleeding, bruising, petechia by limited examination  SKIN: No significant rashes by limited examination  MSK:  Normal upper extremity and trunk strengths by limited examination      EKG:  Sinus rhythm, nonspecific ST and T-wave abnormality  TELE:  Sinus rhythm, no evidence of dysrhythmia  CXR:  Personally reviewed images, no evidence of pulmonary vascular congestion    ECHO:  Prior echocardiogram 03/15/2018 with ejection fraction 55-60% with basal inferior hypokinesis and no significant valvular disease    Results from last 7 days   Lab Units 02/04/20  2107 02/04/20  1732   WBC Thousand/uL  --  8 55   HEMOGLOBIN g/dL  --  13 5   HEMATOCRIT %  --  42 7   PLATELETS Thousands/uL 225 239   NEUTROS PCT %  --  70   MONOS PCT %  --  6     Results from last 7 days   Lab Units 02/05/20  0520 02/04/20  1732   POTASSIUM mmol/L 3 7 4 1   CHLORIDE mmol/L 102 102   CO2 mmol/L 28 27   BUN mg/dL 16 16   CREATININE mg/dL 1 06 1 21   CALCIUM mg/dL 8 3 8 8     Results from last 7 days   Lab Units 02/05/20  0520 02/04/20  1732   POTASSIUM mmol/L 3 7 4 1   CHLORIDE mmol/L 102 102   CO2 mmol/L 28 27   BUN mg/dL 16 16   CREATININE mg/dL 1 06 1 21   CALCIUM mg/dL 8 3 8 8   ALK PHOS U/L  --  58   ALT U/L  --  33   AST U/L  --  25     No results found for: TROPONINT      Results from last 7 days   Lab Units 02/05/20  0028   TROPONIN I ng/mL <0 02         Results from last 7 days   Lab Units 02/04/20  1732   INR  1 01               I have personally reviewed the EKG, CXR and Telemetry images directly        Patient Active Problem List    Diagnosis Date Noted    Insulin dependent diabetes mellitus ([facility] ) 02/04/2020     Priority: Low    PATINO (dyspnea on exertion) 02/04/2020     Priority: Low    Nodular rash 02/04/2020     Priority: Low    Chronic venous insufficiency 08/12/2019     Priority: Low    Leg swelling 08/11/2019     Priority: Low    Morbid obesity ([facility] ) 08/11/2019     Priority: Low    CAD (coronary artery disease) 08/11/2019     Priority: Low    Folliculitis 48/37/2649     Priority: Low    Amputated below knee, left ([facility] ) 10/04/2018     Priority: Low    Phantom limb syndrome without pain ([facility] ) 10/04/2018     Priority: Low    Obstructive sleep apnea 07/28/2018     Priority: Low    Preop cardiovascular exam 07/16/2018     Priority: Low    Post-procedural fever 06/12/2018     Priority: Low    Febrile illness, acute 05/26/2018     Priority: Low    Healthcare-associated pneumonia 05/23/2018     Priority: Low    Chronic CHF ([facility] ) 05/23/2018     Priority: Low    Diabetic ulcer of left heel associated with type 2 diabetes mellitus, limited to breakdown of skin ([facility] ) 05/10/2018     Priority: Low    Encounter for postoperative care 05/08/2018     Priority: Low    Hyponatremia 04/02/2018     Priority: Low    Diabetic autonomic neuropathy associated with type 2 diabetes mellitus ([facility] ) 03/29/2018     Priority: Low    S/P CABG x 3 03/28/2018     Priority: Low    Other constipation 03/26/2018     Priority: Low    Chronic diastolic heart failure ([facility] ) 03/24/2018     Priority: Low    Triple vessel coronary artery disease 03/23/2018     Priority: Low    Diabetic gastroparesis ([facility] ) 03/19/2018     Priority: Low    Obesity (BMI 35 0-39 9 without comorbidity) 03/19/2018     Priority: Low    Orthostatic hypotension 03/16/2018     Priority: Low    PAD (peripheral artery disease) ([facility] ) 03/14/2018     Priority: Low    Elevated troponin 03/14/2018     Priority: Low    S/P BKA (below knee amputation), left ([facility] ) 03/14/2018     Priority: Low    Benign essential HTN 01/11/2018     Priority: Low    Anxiety and depression 12/28/2017     Priority: Low    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 ([facility] ) 12/28/2017     Priority: Low    Vitamin D deficiency 09/20/2016     Priority: Low    Hyperlipidemia 02/11/2015     Priority: Low    Diabetic neuropathy, type II diabetes mellitus ([facility] ) 11/06/2014     Priority: Low       Portions of the record may have been created with voice recognition software  Occasional wrong word or "sound a like" substitutions may have occurred due to the inherent limitations of voice recognition software  Read the chart carefully and recognize, using context, where substitutions have occurred

## 2020-02-06 NOTE — UTILIZATION REVIEW
Continued Stay Review    PLEASE NOTE:   Patient UPGRADED    to   [facility] [facility]    2/5 @ [id number]   from OBS 2/4 @ 1928 due to Progressive PATINO requiring further w/u to R/O myocardial ischemia    Start   Ordered   02/05/20 1638  Inpatient Admission Once     Transfer Service: General Medicine       Question Answer Comment   Admitting Physician EMILY BA    Level of Care Med Surg    Estimated length of stay More than 2 Midnights    Certification I certify that inpatient services are medically necessary for this patient for a duration of greater than two midnights  See H&P and MD Progress Notes for additional information about the patient's course of treatment  02/05/20 1638       Date: 2/6/2020              Current Patient Class: IP Current Level of Care: Med Surg    HPI:59 y o  male initially admitted on   2/4  @  1918  To OBS LOC  Assessment/Plan:   2/5 :  Upgraded to IP LOC  Progressive dyspnea on exertion  And occasional chest discomfort worrisome for  myocardial ischemia  Also swelling R leg  Cardio Consulted  2/6: Per Cardio: Suspect RLE edema, increased exertional dyspnea, small left pleural effusion in the setting of significant sodium use suggestive of volume overload and CHF  He has edema all the way up to his right thighs  His NT proBNP is likely low secondary to his obesity  Start Furosemide  IV bid and K Dur qd  Follow daily weights, electrolytes, clinical exam, renal function  Check ECHO    He was unable to tolerate his stress test this morning due to inability to lay down flat    Pertinent Labs/Diagnostic Results:   Results from last 7 days   Lab Units 02/04/20  2107 02/04/20  1732   WBC Thousand/uL  --  8 55   HEMOGLOBIN g/dL  --  13 5   HEMATOCRIT %  --  42 7   PLATELETS Thousands/uL 225 239   NEUTROS ABS Thousands/µL  --  5 91     Results from last 7 days   Lab Units 02/05/20  0520 02/04/20  1732   SODIUM mmol/L 139 139   POTASSIUM mmol/L 3 7 4 1   CHLORIDE mmol/L 102 102   CO2 mmol/L 28 27 ANION GAP mmol/L 9 10   BUN mg/dL 16 16   CREATININE mg/dL 1 06 1 21   EGFR ml/min/1 73sq m 76 65   CALCIUM mg/dL 8 3 8 8     Results from last 7 days   Lab Units 02/04/20  1732   AST U/L 25   ALT U/L 33   ALK PHOS U/L 58   TOTAL PROTEIN g/dL 8 0   ALBUMIN g/dL 3 0*   TOTAL BILIRUBIN mg/dL 0 27     Results from last 7 days   Lab Units 02/06/20  1559 02/06/20  1044 02/06/20  0641 02/05/20  2130 02/05/20  1623 02/05/20  1120 02/05/20  0709 02/05/20  0021 02/04/20  2207 02/04/20  1715   POC GLUCOSE mg/dl 185* 282* 144* 214* 207* 217* 144* 133 89 164*     Results from last 7 days   Lab Units 02/05/20  0520 02/04/20  1732   GLUCOSE RANDOM mg/dL 139 159*     Results from last 7 days   Lab Units 02/04/20  1732   HEMOGLOBIN A1C % 8 5*   EAG mg/dl 197     Results from last 7 days   Lab Units 02/05/20  0028 02/04/20  2107 02/04/20  1732   TROPONIN I ng/mL <0 02 <0 02 <0 02     Results from last 7 days   Lab Units 02/04/20  1732   PROTIME seconds 13 4   INR  1 01   PTT seconds 26     Results from last 7 days   Lab Units 02/05/20  0520   TSH 3RD GENERATON uIU/mL 4 712*       Results from last 7 days   Lab Units 02/04/20  1732   NT-PRO BNP pg/mL 366*     Vital Signs:   02/06/20 14:43:42  97 3 °F (36 3 C)   71  20  138/69 95 %    02/06/20 0900          None (Room air)   02/06/20 07:09:15  97 7 °F (36 5 °C)  68  20  137/67 97 %    02/05/20 15:09:54  97 6 °F (36 4 °C)  66  18  132/63 95 %    02/05/20 07:08:27  97 4 °F (36 3 °C)  64  18  136/65 98 %          Medications:   Scheduled Medications:    Medications:  atorvastatin 80 mg Oral Daily With Dinner   clopidogrel 75 mg Oral Daily   finasteride 5 mg Oral Daily   furosemide 40 mg Intravenous BID (diuretic)  STATED  IV 2/6   heparin (porcine) 5,000 Units Subcutaneous Q8H [address]   insulin glargine 40 Units Subcutaneous BID   insulin lispro 1-5 Units Subcutaneous 4x Daily (AC & HS)   metoclopramide 5 mg Oral Daily   pantoprazole 40 mg Oral Early Morning   potassium chloride 20 mEq Oral Daily   tamsulosin 0 4 mg Oral Daily With Dinner   triamcinolone  Topical BID     Continuous IV Infusions:     PRN Meds:  ondansetron 4 mg Intravenous Q6H PRN       Discharge Plan: STR    Network Utilization Review Department  [email or web address]  ATTENTION: Please call with any questions or concerns to [phone] and carefully listen to the prompts so that you are directed to the right person  All voicemails are confidential   Shikha Leung all requests for admission clinical reviews, approved or denied determinations and any other requests to dedicated fax number below belonging to the campus where the patient is receiving treatment   List of dedicated fax numbers for the Facilities:  [address] DENIALS (Administrative/Medical Necessity) [phone]   [address] (Maternity/NICU/Pediatrics) [phone]   Mable Rivera [phone]   Hedy Zamora [phone]   Jazmín Trejos [phone]   Robert Salas [phone]   [facility] [address] [phone]   [facility]  [phone]   [address] [facility] [address]  [address] [facility] [address] [facility] [phone]

## 2020-02-06 NOTE — CONSULTS
Consultation - Tom Pearl 60 y o  male MRN: [id number]    Unit/Bed#: [facility] 2 -02 Encounter: [id number]      Assessment/Plan     Assessment:  Changes in skin related to lymphedema  Elephantiasis Nostras Verrucosa  Lymphedema complicated by obesity and past history of thrombophlebitis  R/O hypothyroidism      Plan:  Patients life changes since amputation have aggravated the lymphedema  He is more sedentary and unable to walk up steps or walk for distances due to weakness  Patient with ulceration of anterior ankle  that is resolving from fold of compression stocking  Recommend evaluation by wound care/lymphedema clinic for use of compression wraps or sequential compression boot  Ambulation of patient will help pump lymphatic fluid from legs  Sedentary life style will worsen condition  Physical therapy would be helpful in increasing mobility and decreasing lymphedema  Mild topical steroid (Triamcinolone) will decrease inflammation of area but will not resolve nodular lesions  Biopsy is not indicated and would be high risk for this patient for development of a non healing ulcer  Recommend investigation for lymphatic obstruction and venous obstruction  Doppler study no evidence of venous obstruction  History of Present Illness     HPI: Tom Pearl is a 60y o  year old male who presents with weakness and persistent swelling of right leg  Patient with past history of phlebitis  No pain in leg, patient with diabetic neuropathy and is unable to appreciate pain       Patient reports onset of rash of anterior shin Sep 2019  Swelling of leg has been present for months  Rash of anterior shin slowly expanding and thickening  No discharge, no warmth  Patient has difficulty with eyesight and is unable to see rash clearly  No overseas travel  Consults    Review of Systems   Constitutional: Positive for fatigue  Skin: Positive for rash          Thickening and redness of skin of right shin  Patient unable to determine if area is tender  No discharge noted  Psychiatric/Behavioral: Negative  Historical Information   Past Medical History:   Diagnosis Date    Anemia     Anxiety and depression     Autonomic neuropathy     Cataract     Coronary artery disease     Diabetes mellitus [facility]     Diabetic autonomic neuropathy associated with type 2 diabetes mellitus ([facility] )     Last Assessed: 2017    Gastroparesis due to DM (HCC)     History of DVT (deep vein thrombosis)     left LE    HTN (hypertension)     Hyperlipidemia     Non healing left heel wound     Obesity     Orthostatic hypotension      Past Surgical History:   Procedure Laterality Date    LEG AMPUTATION THROUGH LOWER TIBIA AND FIBULA Left 8/3/2018    Procedure: AMPUTATION BELOW KNEE (BKA) L BKA;  Surgeon: Radha Acuna MD;  [facility]: [facility];  Service: Vascular    [place] CABG, ARTERY-VEIN, FOUR N/A 3/28/2018    Procedure: CORONARY ARTERY BYPASS GRAFT (CABG) x3 VESSELS, LIMA TO LAD, SVG--> PDA, SVG--> OM2,  RIGHT LEG EVH/SVH TO , INTRA-OP AMANDEEP;  Surgeon: Elisabeth Davies MD;  Location: [facility];  Service: Cardiac Surgery    ROTATOR CUFF REPAIR Bilateral      Social History   Social History     Substance and Sexual Activity   Alcohol Use No     Social History     Substance and Sexual Activity   Drug Use No     Social History     Tobacco Use   Smoking Status Former Smoker    Packs/day: 1 00    Years: 12 00    Pack years: 12 00    Types: Cigarettes    Last attempt to quit: 3/23/1994    Years since quittin 8   Smokeless Tobacco Never Used     Family History: non-contributory    Meds/Allergies   all current active meds have been reviewed  Allergies   Allergen Reactions    Metformin      Allergy listed on nursing home paperwork       Objective   Vitals: Blood pressure 132/63, pulse 66, temperature 97 6 °F (36 4 °C), resp  rate 18, weight 124 kg (273 lb 13 oz), SpO2 95 %      Intake/Output Summary (Last 24 hours) at 2/5/2020 2036  Last data filed at 2/5/2020 7951  Gross per 24 hour   Intake    Output 300 ml   Net -300 ml     Invasive Devices     Peripheral Intravenous Line            Peripheral IV 02/04/20 Left Hand 1 day    Peripheral IV 02/04/20 Right Antecubital 1 day                        Physical Exam   Constitutional: He is oriented to person, place, and time  He appears well-developed and well-nourished  HENT:   Head: Normocephalic  Musculoskeletal: He exhibits edema  He exhibits no tenderness  Woody edema of right leg  Neurological: He is alert and oriented to person, place, and time  Skin: Skin is warm and dry  Rash noted  19 x 12 cm dusky cobblestone plaque of mid aspect of right shin  Mild hyperkeratosis  No ulceration, No drainage, No warmth  Woody edema of entire leg  Feet warm  Palpable dorsalis pedis  No ulcerations of feet  Lab Results: I have personally reviewed pertinent reports  Imaging Studies: I have personally reviewed pertinent reports  EKG, Pathology, and Other Studies: I have personally reviewed pertinent reports  Code Status: Level 1 - Full Code    Counseling / Coordination of Care  Total floor / unit time spent today 90 minutes  Greater than 50% of total time was spent with the patient and / or family counseling and / or coordination of care  A description of the counseling / coordination of care: Discussion of need for ambulation, compression, daily visual exam of leg  Discussion of etiology of nodules of shins

## 2020-02-06 NOTE — PROGRESS NOTES
Progress Note - Aleksander Minor 60 y o  male MRN: [id number]    Unit/Bed#: [facility] 2 -02 Encounter: [id number]      Subjective: The patient feels okay at the moment  He was not able to tolerate stress testing because he felt short of breath when lying flat  He has had no chest pain  He slept pretty well  He has no abdominal pain, nausea, or vomiting  Physical Exam:   Temp:  [97 3 °F (36 3 °C)-97 7 °F (36 5 °C)] 97 3 °F (36 3 °C)  HR:  [68-71] 71  Resp:  [20] 20  BP: (137-138)/(67-69) 138/69    Gen:  Well-developed, well-nourished, in no distress  Neck:  Supple  No lymphadenopathy, goiter, or bruit  Heart:  Regular rhythm  No murmur, gallop, or rub  Lungs:  Clear to auscultation and percussion  No wheezing, rales, or rhonchi    Abd:  Soft with active bowel sounds  No mass, tenderness, or organomegaly  Extremities:  +3 edema  Neuro:  Alert and oriented  No focal sign  Skin:  Warm and dry      LABS:  No new labs          Assessment/Plan:  1  Dyspnea on exertion most likely related to acute on chronic diastolic congestive heart failure   2  Coronary artery disease  3  Type 2 diabetes with neuropathy  4  Peripheral vascular disease   5  Elephantiasis Nostras Verrucosa    The patient has been started on IV diuretics  Echocardiogram was limited but appeared to show normal ventricular function  Hopefully, adequate diuresis will improve the patient's skin issues      VTE Pharmacologic Prophylaxis: Heparin  VTE Mechanical Prophylaxis: reason for no mechanical VTE prophylaxis CHF

## 2020-02-07 LAB
CHOLEST SERPL-MCNC: 130 MG/DL (ref 50–200)
GLUCOSE SERPL-MCNC: 182 MG/DL (ref 65–140)
GLUCOSE SERPL-MCNC: 196 MG/DL (ref 65–140)
GLUCOSE SERPL-MCNC: 210 MG/DL (ref 65–140)
GLUCOSE SERPL-MCNC: 223 MG/DL (ref 65–140)
HDLC SERPL-MCNC: 31 MG/DL
LDLC SERPL CALC-MCNC: 59 MG/DL (ref 0–100)
NONHDLC SERPL-MCNC: 99 MG/DL
TRIGL SERPL-MCNC: 198 MG/DL

## 2020-02-07 PROCEDURE — 99232 SBSQ HOSP IP/OBS MODERATE 35: CPT | Performed by: INTERNAL MEDICINE

## 2020-02-07 PROCEDURE — 80061 LIPID PANEL: CPT | Performed by: INTERNAL MEDICINE

## 2020-02-07 PROCEDURE — 97116 GAIT TRAINING THERAPY: CPT

## 2020-02-07 PROCEDURE — 97535 SELF CARE MNGMENT TRAINING: CPT

## 2020-02-07 PROCEDURE — 97530 THERAPEUTIC ACTIVITIES: CPT

## 2020-02-07 PROCEDURE — 82948 REAGENT STRIP/BLOOD GLUCOSE: CPT

## 2020-02-07 RX ORDER — FUROSEMIDE 10 MG/ML
80 INJECTION INTRAMUSCULAR; INTRAVENOUS
Status: DISCONTINUED | OUTPATIENT
Start: 2020-02-07 | End: 2020-02-10

## 2020-02-07 RX ORDER — FUROSEMIDE 10 MG/ML
80 INJECTION INTRAMUSCULAR; INTRAVENOUS
Status: DISCONTINUED | OUTPATIENT
Start: 2020-02-07 | End: 2020-02-07

## 2020-02-07 RX ADMIN — FINASTERIDE 5 MG: 5 TABLET, FILM COATED ORAL at 08:45

## 2020-02-07 RX ADMIN — TAMSULOSIN HYDROCHLORIDE 0.4 MG: 0.4 CAPSULE ORAL at 17:27

## 2020-02-07 RX ADMIN — ATORVASTATIN CALCIUM 80 MG: 80 TABLET, FILM COATED ORAL at 17:27

## 2020-02-07 RX ADMIN — FUROSEMIDE 80 MG: 10 INJECTION, SOLUTION INTRAMUSCULAR; INTRAVENOUS at 10:46

## 2020-02-07 RX ADMIN — PANTOPRAZOLE SODIUM 40 MG: 40 TABLET, DELAYED RELEASE ORAL at 05:02

## 2020-02-07 RX ADMIN — INSULIN LISPRO 1 UNITS: 100 INJECTION, SOLUTION INTRAVENOUS; SUBCUTANEOUS at 08:45

## 2020-02-07 RX ADMIN — FUROSEMIDE 80 MG: 10 INJECTION, SOLUTION INTRAMUSCULAR; INTRAVENOUS at 17:27

## 2020-02-07 RX ADMIN — HEPARIN SODIUM 5000 UNITS: 5000 INJECTION INTRAVENOUS; SUBCUTANEOUS at 14:29

## 2020-02-07 RX ADMIN — TRIAMCINOLONE ACETONIDE: 1 CREAM TOPICAL at 08:46

## 2020-02-07 RX ADMIN — INSULIN LISPRO 1 UNITS: 100 INJECTION, SOLUTION INTRAVENOUS; SUBCUTANEOUS at 17:28

## 2020-02-07 RX ADMIN — METOCLOPRAMIDE HYDROCHLORIDE 5 MG: 10 TABLET ORAL at 08:45

## 2020-02-07 RX ADMIN — HEPARIN SODIUM 5000 UNITS: 5000 INJECTION INTRAVENOUS; SUBCUTANEOUS at 21:38

## 2020-02-07 RX ADMIN — INSULIN GLARGINE 44 UNITS: 100 INJECTION, SOLUTION SUBCUTANEOUS at 17:27

## 2020-02-07 RX ADMIN — INSULIN GLARGINE 44 UNITS: 100 INJECTION, SOLUTION SUBCUTANEOUS at 08:45

## 2020-02-07 RX ADMIN — CLOPIDOGREL 75 MG: 75 TABLET, FILM COATED ORAL at 08:45

## 2020-02-07 RX ADMIN — POTASSIUM CHLORIDE 20 MEQ: 1500 TABLET, EXTENDED RELEASE ORAL at 08:45

## 2020-02-07 RX ADMIN — TRIAMCINOLONE ACETONIDE: 1 CREAM TOPICAL at 17:27

## 2020-02-07 RX ADMIN — INSULIN LISPRO 2 UNITS: 100 INJECTION, SOLUTION INTRAVENOUS; SUBCUTANEOUS at 21:38

## 2020-02-07 RX ADMIN — INSULIN LISPRO 2 UNITS: 100 INJECTION, SOLUTION INTRAVENOUS; SUBCUTANEOUS at 10:59

## 2020-02-07 RX ADMIN — HEPARIN SODIUM 5000 UNITS: 5000 INJECTION INTRAVENOUS; SUBCUTANEOUS at 05:02

## 2020-02-07 NOTE — PLAN OF CARE
Problem: Potential for Falls  Goal: Patient will remain free of falls  Description  INTERVENTIONS:  - Assess patient frequently for physical needs  -  Identify cognitive and physical deficits and behaviors that affect risk of falls    -  [place] fall precautions as indicated by assessment   - Educate patient/family on patient safety including physical limitations  - Instruct patient to call for assistance with activity based on assessment  - Modify environment to reduce risk of injury  - Consider OT/PT consult to assist with strengthening/mobility  Outcome: Progressing     Problem: Prexisting or High Potential for Compromised Skin Integrity  Goal: Skin integrity is maintained or improved  Description  INTERVENTIONS:  - Identify patients at risk for skin breakdown  - Assess and monitor skin integrity  - Assess and monitor nutrition and hydration status  - Monitor labs   - Assess for incontinence   - Turn and reposition patient  - Assist with mobility/ambulation  - Relieve pressure over bony prominences  - Avoid friction and shearing  - Provide appropriate hygiene as needed including keeping skin clean and dry  - Evaluate need for skin moisturizer/barrier cream  - Collaborate with interdisciplinary team   - Patient/family teaching  - Consider wound care consult   Outcome: Progressing     Problem: CARDIOVASCULAR - ADULT  Goal: Maintains optimal cardiac output and hemodynamic stability  Description  INTERVENTIONS:  - Monitor I/O, vital signs and rhythm  - Monitor for S/S and trends of decreased cardiac output  - Administer and titrate ordered vasoactive medications to optimize hemodynamic stability  - Assess quality of pulses, skin color and temperature  - Assess for signs of decreased coronary artery perfusion  - Instruct patient to report change in severity of symptoms  Outcome: Progressing  Goal: Absence of cardiac dysrhythmias or at baseline rhythm  Description  INTERVENTIONS:  - Continuous cardiac monitoring, vital signs, obtain 12 lead EKG if ordered  - Administer antiarrhythmic and heart rate control medications as ordered  - Monitor electrolytes and administer replacement therapy as ordered  Outcome: Progressing     Problem: RESPIRATORY - ADULT  Goal: Achieves optimal ventilation and oxygenation  Description  INTERVENTIONS:  - Assess for changes in respiratory status  - Assess for changes in mentation and behavior  - Position to facilitate oxygenation and minimize respiratory effort  - Oxygen administered by appropriate delivery if ordered  - Initiate smoking cessation education as indicated  - Encourage broncho-pulmonary hygiene including cough, deep breathe, Incentive Spirometry  - Assess the need for suctioning and aspirate as needed  - Assess and instruct to report SOB or any respiratory difficulty  - Respiratory Therapy support as indicated  Outcome: Progressing     Problem: SKIN/TISSUE INTEGRITY - ADULT  Goal: Skin integrity remains intact  Description  INTERVENTIONS  - Identify patients at risk for skin breakdown  - Assess and monitor skin integrity  - Assess and monitor nutrition and hydration status  - Monitor labs (i e  albumin)  - Assess for incontinence   - Turn and reposition patient  - Assist with mobility/ambulation  - Relieve pressure over bony prominences  - Avoid friction and shearing  - Provide appropriate hygiene as needed including keeping skin clean and dry  - Evaluate need for skin moisturizer/barrier cream  - Collaborate with interdisciplinary team (i e  Nutrition, Rehabilitation, etc )   - Patient/family teaching  Outcome: Progressing     Problem: MUSCULOSKELETAL - ADULT  Goal: Maintain or return mobility to safest level of function  Description  INTERVENTIONS:  - Assess patient's ability to carry out ADLs; assess patient's baseline for ADL function and identify physical deficits which impact ability to perform ADLs (bathing, care of mouth/teeth, toileting, grooming, dressing, etc )  - Assess/evaluate cause of self-care deficits   - Assess range of motion  - Assess patient's mobility  - Assess patient's need for assistive devices and provide as appropriate  - Encourage maximum independence but intervene and supervise when necessary  - Involve family in performance of ADLs  - Assess for home care needs following discharge   - Consider OT consult to assist with ADL evaluation and planning for discharge  - Provide patient education as appropriate  Outcome: Progressing     Problem: PAIN - ADULT  Goal: Verbalizes/displays adequate comfort level or baseline comfort level  Description  Interventions:  - Encourage patient to monitor pain and request assistance  - Assess pain using appropriate pain scale  - Administer analgesics based on type and severity of pain and evaluate response  - Implement non-pharmacological measures as appropriate and evaluate response  - Consider cultural and social influences on pain and pain management  - Notify physician/advanced practitioner if interventions unsuccessful or patient reports new pain  Outcome: Progressing     Problem: INFECTION - ADULT  Goal: Absence or prevention of progression during hospitalization  Description  INTERVENTIONS:  - Assess and monitor for signs and symptoms of infection  - Monitor lab/diagnostic results  - Monitor all insertion sites, i e  indwelling lines, tubes, and drains  - Monitor endotracheal if appropriate and nasal secretions for changes in amount and color  - [place] appropriate cooling/warming therapies per order  - Administer medications as ordered  - Instruct and encourage patient and family to use good hand hygiene technique  - Identify and instruct in appropriate isolation precautions for identified infection/condition  Outcome: Progressing     Problem: DISCHARGE PLANNING  Goal: Discharge to home or other facility with appropriate resources  Description  INTERVENTIONS:  - Identify barriers to discharge w/patient and caregiver  - Arrange for needed discharge resources and transportation as appropriate  - Identify discharge learning needs (meds, wound care, etc )  - Arrange for interpretive services to assist at discharge as needed  - Refer to Case Management Department for coordinating discharge planning if the patient needs post-hospital services based on physician/advanced practitioner order or complex needs related to functional status, cognitive ability, or social support system  Outcome: Progressing     Problem: Knowledge Deficit  Goal: Patient/family/caregiver demonstrates understanding of disease process, treatment plan, medications, and discharge instructions  Description  Complete learning assessment and assess knowledge base  Interventions:  - Provide teaching at level of understanding  - Provide teaching via preferred learning methods  Outcome: Progressing     Problem: Nutrition/Hydration-ADULT  Goal: Nutrient/Hydration intake appropriate for improving, restoring or maintaining nutritional needs  Description  Monitor and assess patient's nutrition/hydration status for malnutrition  Collaborate with interdisciplinary team and initiate plan and interventions as ordered  Monitor patient's weight and dietary intake as ordered or per policy  Utilize nutrition screening tool and intervene as necessary  Determine patient's food preferences and provide high-protein, high-caloric foods as appropriate       INTERVENTIONS:  - Monitor oral intake, urinary output, labs, and treatment plans  - Assess nutrition and hydration status and recommend course of action  - Evaluate amount of meals eaten  - Assist patient with eating if necessary   - Allow adequate time for meals  - Recommend/ encourage appropriate diets, oral nutritional supplements, and vitamin/mineral supplements  - Order, calculate, and assess calorie counts as needed  - Recommend, monitor, and adjust tube feedings and TPN/PPN based on assessed needs  - Assess need for intravenous fluids  - Provide specific nutrition/hydration education as appropriate  - Include patient/family/caregiver in decisions related to nutrition  Outcome: Progressing

## 2020-02-07 NOTE — PROGRESS NOTES
Progress Note - Luna Newton 60 y o  male MRN: [id number]    Unit/Bed#: [facility] -02 Encounter: [id number]      Subjective: The patient feels reasonably well  He denies chest pain, shortness of breath, palpitations, or dizziness  He has no nausea or vomiting  He slept pretty well  He is eating well  Physical Exam:   Temp:  [97 3 °F (36 3 °C)-97 6 °F (36 4 °C)] 97 6 °F (36 4 °C)  HR:  [71-75] 75  Resp:  [18-20] 18  BP: ()/(55-81) 118/81    Gen:  Well-developed, well-nourished, in no distress  Neck:  Supple  No lymphadenopathy, goiter, or bruit  Heart:  Regular rhythm  No murmur, gallop, or rub  Lungs:  Clear to auscultation and percussion  No wheezing, rales, or rhonchi   Abd:  Soft with active bowel sounds  No mass, tenderness, or organomegaly  Extremities:  +3 edema  Neuro:  Alert and oriented  No focal sign  Skin:  Warm and dry      LABS:  No new labs      Assessment/Plan:  1  Acute on chronic diastolic congestive heart failure  2  Coronary artery disease  3  Type 2 diabetes with neuropathy  4  Peripheral vascular disease  5  Elephantiasis nostris verrucosa  6  Status post left BKA    Patient is not diuresing despite furosemide 40 mg twice daily  The dose has been increased  We will continue to monitor his electrolytes and weight  He is having no angina  His diabetic control is satisfactory        VTE Pharmacologic Prophylaxis: Heparin  VTE Mechanical Prophylaxis: reason for no mechanical VTE prophylaxis CHF

## 2020-02-07 NOTE — OCCUPATIONAL THERAPY NOTE
Occupational Therapy Treatment Note:         02/07/20 1539   Restrictions/Precautions   Weight Bearing Precautions Per Order No   Braces or Orthoses Prosthesis   Other Precautions Fall Risk;Pain   Pain Assessment   Pain Assessment No/denies pain   Pain Score No Pain   ADL   Where Assessed Chair   Grooming Assistance 5  Supervision/Setup   Grooming Deficit Setup   UB Bathing Assistance 5  Supervision/Setup   UB Bathing Deficit Setup   LB Bathing Assistance 3  Moderate Assistance   LB Bathing Deficit Verbal cueing; Increased time to complete;Supervision/safety; Perineal area; Buttocks   LB Bathing Comments limited functional reach with simulation  LB Dressing Assistance 2  Maximal Assistance   LB Dressing Deficit Steadying;Setup; Requires assistive device for steadying;Verbal cueing;Supervision/safety; Increased time to complete; Don/doff R sock; Don/doff L sock; Thread LLE into pants; Thread RLE into pants;Pull up over hips   Toileting Comments self limiting behaviors noted this tx session  Functional Standing Tolerance   Time 4 mins   Activity dynamic stand balance activity  Comments Pt reported increased fatigue with activity  Transfers   Sit to Stand 4  Minimal assistance   Additional items Assist x 1;Bedrails;Armrests; Increased time required;Verbal cues   Stand to Sit 4  Minimal assistance   Additional items Assist x 1; Armrests; Increased time required;Verbal cues   Stand pivot 4  Minimal assistance   Additional items Assist x 1   Toilet transfer 4  Minimal assistance   Additional items Assist x 1; Increased time required;Verbal cues; Commode   Functional Mobility   Functional Mobility 4  Minimal assistance   Additional Comments x1   Additional items Rolling walker   Cognition   Overall Cognitive Status WFL   Arousal/Participation Alert; Cooperative   Attention Within functional limits   Orientation Level Oriented X4   Memory Decreased short term memory;Decreased recall of recent events;Decreased recall of precautions   Following Commands Follows one step commands without difficulty   Additional Activities   Additional Activities Other (Comment)  (reviewed current plan of care  )   Additional Activities Comments Pt reports having F understanding  Activity Tolerance   Activity Tolerance Patient limited by fatigue;Patient limited by pain   Medical Staff Made Aware reported all findings to nursing staff  Assessment   Assessment Pt was seen for skilled OT with focus on completion of self care tasks, functional transfers, self toileting and review of current plan of care  Pt with some self limiting comments initially noted  Pt with contradictory comments regarding suggestions of AE to promote independence with LE self care/dressing  Pt reports I've tried all those things and it doesn't work regarding use of shoehorn and long handled reacher  Recommended use of a household bidet to attach to standard toilet due to c/o of Pt being unable to wipe himself and also having trialed use of a toilet aid in the past  Written information provided to Pt to encourage carry over  Pt able to complete functional transfer to bathroom for self tioleting with Min A overall  Recommended daily completion of self toileting in Pt's bathroom with extra wide commode positioned over standard toilet  Pt was encouraged to ambulate with nursing staff 2-3 xs daily to promote strengthening to encourage independence with all functional tasks  Plan   Treatment Interventions ADL retraining;Functional transfer training;UE strengthening/ROM; Cognitive reorientation;Patient/family training; Endurance training   Goal Expiration Date 02/19/20   OT Treatment Day 1   OT Frequency 3-5x/wk   Recommendation   OT Discharge Recommendation Short Term Rehab   OT - OK to Discharge No  (when medically cleared  )   [place] Index   Feeding 10   Bathing 0   Grooming Score 5   Dressing Score 5   Bladder Score 10   Bowels Score 10   Toilet Use Score 5   Transfers (Bed/Chair) Score 5   Mobility (Level Surface) Score 0   Stairs Score 0   [place] Index Score 50   Modified [place] Scale   Modified [place] Scale 4   Regine Tran, [address]

## 2020-02-07 NOTE — PLAN OF CARE
Problem: Potential for Falls  Goal: Patient will remain free of falls  Description  INTERVENTIONS:  - Assess patient frequently for physical needs  -  Identify cognitive and physical deficits and behaviors that affect risk of falls    -  [place] fall precautions as indicated by assessment   - Educate patient/family on patient safety including physical limitations  - Instruct patient to call for assistance with activity based on assessment  - Modify environment to reduce risk of injury  - Consider OT/PT consult to assist with strengthening/mobility  Outcome: Progressing     Problem: Prexisting or High Potential for Compromised Skin Integrity  Goal: Skin integrity is maintained or improved  Description  INTERVENTIONS:  - Identify patients at risk for skin breakdown  - Assess and monitor skin integrity  - Assess and monitor nutrition and hydration status  - Monitor labs   - Assess for incontinence   - Turn and reposition patient  - Assist with mobility/ambulation  - Relieve pressure over bony prominences  - Avoid friction and shearing  - Provide appropriate hygiene as needed including keeping skin clean and dry  - Evaluate need for skin moisturizer/barrier cream  - Collaborate with interdisciplinary team   - Patient/family teaching  - Consider wound care consult   Outcome: Progressing     Problem: CARDIOVASCULAR - ADULT  Goal: Maintains optimal cardiac output and hemodynamic stability  Description  INTERVENTIONS:  - Monitor I/O, vital signs and rhythm  - Monitor for S/S and trends of decreased cardiac output  - Administer and titrate ordered vasoactive medications to optimize hemodynamic stability  - Assess quality of pulses, skin color and temperature  - Assess for signs of decreased coronary artery perfusion  - Instruct patient to report change in severity of symptoms  Outcome: Progressing  Goal: Absence of cardiac dysrhythmias or at baseline rhythm  Description  INTERVENTIONS:  - Continuous cardiac monitoring, vital signs, obtain 12 lead EKG if ordered  - Administer antiarrhythmic and heart rate control medications as ordered  - Monitor electrolytes and administer replacement therapy as ordered  Outcome: Progressing     Problem: RESPIRATORY - ADULT  Goal: Achieves optimal ventilation and oxygenation  Description  INTERVENTIONS:  - Assess for changes in respiratory status  - Assess for changes in mentation and behavior  - Position to facilitate oxygenation and minimize respiratory effort  - Oxygen administered by appropriate delivery if ordered  - Initiate smoking cessation education as indicated  - Encourage broncho-pulmonary hygiene including cough, deep breathe, Incentive Spirometry  - Assess the need for suctioning and aspirate as needed  - Assess and instruct to report SOB or any respiratory difficulty  - Respiratory Therapy support as indicated  Outcome: Progressing     Problem: SKIN/TISSUE INTEGRITY - ADULT  Goal: Skin integrity remains intact  Description  INTERVENTIONS  - Identify patients at risk for skin breakdown  - Assess and monitor skin integrity  - Assess and monitor nutrition and hydration status  - Monitor labs (i e  albumin)  - Assess for incontinence   - Turn and reposition patient  - Assist with mobility/ambulation  - Relieve pressure over bony prominences  - Avoid friction and shearing  - Provide appropriate hygiene as needed including keeping skin clean and dry  - Evaluate need for skin moisturizer/barrier cream  - Collaborate with interdisciplinary team (i e  Nutrition, Rehabilitation, etc )   - Patient/family teaching  Outcome: Progressing     Problem: MUSCULOSKELETAL - ADULT  Goal: Maintain or return mobility to safest level of function  Description  INTERVENTIONS:  - Assess patient's ability to carry out ADLs; assess patient's baseline for ADL function and identify physical deficits which impact ability to perform ADLs (bathing, care of mouth/teeth, toileting, grooming, dressing, etc )  - Assess/evaluate cause of self-care deficits   - Assess range of motion  - Assess patient's mobility  - Assess patient's need for assistive devices and provide as appropriate  - Encourage maximum independence but intervene and supervise when necessary  - Involve family in performance of ADLs  - Assess for home care needs following discharge   - Consider OT consult to assist with ADL evaluation and planning for discharge  - Provide patient education as appropriate  Outcome: Progressing     Problem: PAIN - ADULT  Goal: Verbalizes/displays adequate comfort level or baseline comfort level  Description  Interventions:  - Encourage patient to monitor pain and request assistance  - Assess pain using appropriate pain scale  - Administer analgesics based on type and severity of pain and evaluate response  - Implement non-pharmacological measures as appropriate and evaluate response  - Consider cultural and social influences on pain and pain management  - Notify physician/advanced practitioner if interventions unsuccessful or patient reports new pain  Outcome: Progressing     Problem: INFECTION - ADULT  Goal: Absence or prevention of progression during hospitalization  Description  INTERVENTIONS:  - Assess and monitor for signs and symptoms of infection  - Monitor lab/diagnostic results  - Monitor all insertion sites, i e  indwelling lines, tubes, and drains  - Monitor endotracheal if appropriate and nasal secretions for changes in amount and color  - [place] appropriate cooling/warming therapies per order  - Administer medications as ordered  - Instruct and encourage patient and family to use good hand hygiene technique  - Identify and instruct in appropriate isolation precautions for identified infection/condition  Outcome: Progressing     Problem: DISCHARGE PLANNING  Goal: Discharge to home or other facility with appropriate resources  Description  INTERVENTIONS:  - Identify barriers to discharge w/patient and caregiver  - Arrange for needed discharge resources and transportation as appropriate  - Identify discharge learning needs (meds, wound care, etc )  - Arrange for interpretive services to assist at discharge as needed  - Refer to Case Management Department for coordinating discharge planning if the patient needs post-hospital services based on physician/advanced practitioner order or complex needs related to functional status, cognitive ability, or social support system  Outcome: Progressing     Problem: Knowledge Deficit  Goal: Patient/family/caregiver demonstrates understanding of disease process, treatment plan, medications, and discharge instructions  Description  Complete learning assessment and assess knowledge base  Interventions:  - Provide teaching at level of understanding  - Provide teaching via preferred learning methods  Outcome: Progressing     Problem: Nutrition/Hydration-ADULT  Goal: Nutrient/Hydration intake appropriate for improving, restoring or maintaining nutritional needs  Description  Monitor and assess patient's nutrition/hydration status for malnutrition  Collaborate with interdisciplinary team and initiate plan and interventions as ordered  Monitor patient's weight and dietary intake as ordered or per policy  Utilize nutrition screening tool and intervene as necessary  Determine patient's food preferences and provide high-protein, high-caloric foods as appropriate       INTERVENTIONS:  - Monitor oral intake, urinary output, labs, and treatment plans  - Assess nutrition and hydration status and recommend course of action  - Evaluate amount of meals eaten  - Assist patient with eating if necessary   - Allow adequate time for meals  - Recommend/ encourage appropriate diets, oral nutritional supplements, and vitamin/mineral supplements  - Order, calculate, and assess calorie counts as needed  - Recommend, monitor, and adjust tube feedings and TPN/PPN based on assessed needs  - Assess need for intravenous fluids  - Provide specific nutrition/hydration education as appropriate  - Include patient/family/caregiver in decisions related to nutrition  Outcome: Progressing

## 2020-02-07 NOTE — PROGRESS NOTES
Progress Note - Cardiology   Cornelio Diaz 60 y o  male MRN: [id number]  Unit/Bed#: [facility] 68 2 [address] 87 218-02 Encounter: [id number]      Assessment/Recommendations/Discussion:   1  Acute on chronic diastolic heart failure, predominantly right-sided  2  CAD status post CABG x3, 03/2018 with LIMA to LAD, SVG to circumflex, SVG to RCA  3  Diabetes  4  Peripheral arterial disease, prior left BKA  5  Dyslipidemia  6  Orthostatic hypotension      · Pt feels he has not been urinating much, doesn't sense much change in LE edema  Weight up a bit (?accuracy)  Will increase lasix to 80 IV BID  · Renal fx and potassium stable  · BP controlled  · Continue statin, plavix  · Lipid panel controlled, LDL-C at goal 59 mg/dL        Subjective: Pt seen/examined  Feels well, no CP, SOB    Doesn't feel edema better subjectively          Physical Exam:  GEN:  NAD  HEENT:  MMM, NCAT, pink conjunctiva, EOMI, nonicteric sclera  CV:  NO JVD/HJR, RR, NO M/R/G, +S1/S2, NO PARASTERNAL HEAVE/THRILL, ++RLE EDEMA, NO HEPATIC SYSTOLIC PULSATION, WARM EXTREMITIES  RESP:  CTAB/L  ABD:  SOFT, NT, NO GROSS ORGANOMEGALY        Vitals:   /67   Pulse 72   Temp 97 6 °F (36 4 °C)   Resp 18   Wt 126 kg (276 lb 10 8 oz)   SpO2 95%   BMI 35 52 kg/m²   Vitals:    02/06/20 0600 02/07/20 0600   Weight: 124 kg (273 lb 5 9 oz) 126 kg (276 lb 10 8 oz)       Intake/Output Summary (Last 24 hours) at 2/7/2020 1007  Last data filed at 2/6/2020 1901  Gross per 24 hour   Intake    Output 1650 ml   Net -1650 ml         Lab Results:  Results from last 7 days   Lab Units 02/04/20  2107 02/04/20  1732   WBC Thousand/uL  --  8 55   HEMOGLOBIN g/dL  --  13 5   HEMATOCRIT %  --  42 7   PLATELETS Thousands/uL 225 239     Results from last 7 days   Lab Units 02/05/20  0520 02/04/20  1732   POTASSIUM mmol/L 3 7 4 1   CHLORIDE mmol/L 102 102   CO2 mmol/L 28 27   BUN mg/dL 16 16   CREATININE mg/dL 1 06 1 21   CALCIUM mg/dL 8 3 8 8   ALK PHOS U/L  --  58   ALT U/L  --  33   AST U/L  --  25     Results from last 7 days   Lab Units 02/05/20  0520   POTASSIUM mmol/L 3 7   CHLORIDE mmol/L 102   CO2 mmol/L 28   BUN mg/dL 16   CREATININE mg/dL 1 06   CALCIUM mg/dL 8 3           Medications:    Current Facility-Administered Medications:     atorvastatin (LIPITOR) tablet 80 mg, 80 mg, Oral, Daily With Terese Pablo PA-C, 80 mg at 02/06/20 1636    clopidogrel (PLAVIX) tablet 75 mg, 75 mg, Oral, Daily, Jennifer Boles PA-C, 75 mg at 02/07/20 0845    finasteride (PROSCAR) tablet 5 mg, 5 mg, Oral, Daily, Jennifer Boles PA-C, 5 mg at 02/07/20 0845    furosemide (LASIX) injection 40 mg, 40 mg, Intravenous, BID (diuretic), Rujul Merchant, DO, 40 mg at 02/06/20 1636    heparin (porcine) subcutaneous injection 5,000 Units, 5,000 Units, Subcutaneous, Q8H [address], 5,000 Units at 02/07/20 0502 **AND** [COMPLETED] Platelet count, , , Once, Jennifer Boles PA-C    insulin glargine (LANTUS) subcutaneous injection 44 Units 0 44 mL, 44 Units, Subcutaneous, BID, Mariposa Kohli MD, 44 Units at 02/07/20 0845    insulin lispro (HumaLOG) 100 units/mL subcutaneous injection 1-5 Units, 1-5 Units, Subcutaneous, 4x Daily (AC & HS), 1 Units at 02/07/20 0845 **AND** Fingerstick Glucose (POCT), , , 4x Daily AC and at bedtime, Jennifer Boles PA-C    metoclopramide (REGLAN) tablet 5 mg, 5 mg, Oral, Daily, Jennifer Boles PA-C, 5 mg at 02/07/20 0845    ondansetron (ZOFRAN) injection 4 mg, 4 mg, Intravenous, Q6H PRN, Jennifer Boles PA-C    pantoprazole (PROTONIX) EC tablet 40 mg, 40 mg, Oral, Early Morning, Jennifer Boles PA-C, 40 mg at 02/07/20 0502    potassium chloride (K-DUR,KLOR-CON) CR tablet 20 mEq, 20 mEq, Oral, Daily, Rujul DO Mayur, 20 mEq at 02/07/20 0845    tamsulosin (FLOMAX) capsule 0 4 mg, 0 4 mg, Oral, Daily With [employer] M NUNO Boles, 0 4 mg at 02/06/20 1636    triamcinolone (KENALOG) 0 1 % cream, , Topical, BID, Dooley Seal, DO    This note was completed in part utilizing M-Modal Fluency Direct Software  Grammatical errors, random word insertions, spelling mistakes, and incomplete sentences may be an occasional consequence of this system secondary to software limitations, ambient noise, and hardware issues  If you have any questions or concerns about the content, text, or information contained within the body of this dictation, please contact the provider for clarification

## 2020-02-07 NOTE — PLAN OF CARE
Problem: OCCUPATIONAL THERAPY ADULT  Goal: Performs self-care activities at highest level of function for planned discharge setting  See evaluation for individualized goals  Description  Treatment Interventions: Functional transfer training, ADL retraining, UE strengthening/ROM, Endurance training, Cognitive reorientation, Patient/family training, Equipment evaluation/education, Compensatory technique education, Activityengagement, Energy conservation          See flowsheet documentation for full assessment, interventions and recommendations  Outcome: Progressing  Note:   Limitation: Decreased ADL status, Decreased Safe judgement during ADL, Decreased UE strength, Decreased cognition, Decreased endurance, Decreased self-care trans, Decreased high-level ADLs, Decreased sensation, Decreased fine motor control  Prognosis: Good  Assessment: Pt was seen for skilled OT with focus on completion of self care tasks, functional transfers, self toileting and review of current plan of care  Pt with some self limiting comments initially noted  Pt with contradictory comments regarding suggestions of AE to promote independence with LE self care/dressing  Pt reports I've tried all those things and it doesn't work regarding use of shoehorn and long handled reacher  Recommended use of a household bidet to attach to standard toilet due to c/o of Pt being unable to wipe himself and also having trialed use of a toilet aid in the past  Written information provided to Pt to encourage carry over  Pt able to complete functional transfer to bathroom for self tioleting with Min A overall  Recommended daily completion of self toileting in Pt's bathroom with extra wide commode positioned over standard toilet  Pt was encouraged to ambulate with nursing staff 2-3 xs daily to promote strengthening to encourage independence with all functional tasks        OT Discharge Recommendation: Short Term Rehab  OT - OK to Discharge: No(when medically cleared  )

## 2020-02-07 NOTE — PLAN OF CARE
Problem: PHYSICAL THERAPY ADULT  Goal: Performs mobility at highest level of function for planned discharge setting  See evaluation for individualized goals  Description  Treatment/Interventions: Functional transfer training, LE strengthening/ROM, Elevations, Therapeutic exercise, Endurance training, Patient/family training, Equipment eval/education, Bed mobility, Gait training, Spoke to nursing, OT  Equipment Recommended: Blaze Anaya, Wheelchair       See flowsheet documentation for full assessment, interventions and recommendations  Outcome: Progressing  Note:   Prognosis: Fair  Problem List: Decreased range of motion, Decreased strength, Decreased endurance, Impaired balance, Decreased mobility, Obesity, Decreased skin integrity, Orthopedic restrictions, Pain, Impaired judgement, Decreased cognition, Decreased safety awareness  Assessment: Pt  seated in bedside chair upon my arrival  Performance of HEP with cues provided for proper completion  Progressed with transfers requiring A of therapist with cues for hand placement/technique  Progressed with an amb  trial with use of RW and A of therapist with cues for LE sequencing  Pt  required a seated resting period in between gait trials  Pt  returned to seated in bedside chair at end of treatment session  PT will continue to recommend d/c to rehab when medically stable for continued improvement of noted impairments above  Recommendation: Short-term skilled PT     PT - OK to Discharge: Yes(if d/c to rehab when medically stable )    See flowsheet documentation for full assessment

## 2020-02-07 NOTE — PHYSICAL THERAPY NOTE
Physical Therapy Progress Note     02/07/20 1530   Pain Assessment   Pain Assessment No/denies pain   Pain Score No Pain   Restrictions/Precautions   Weight Bearing Precautions Per Order No   Braces or Orthoses Prosthesis  (LLE)   Other Precautions Fall Risk   General   Chart Reviewed Yes   Response to Previous Treatment Patient reporting fatigue but able to participate  Family/Caregiver Present No   Subjective   Subjective Willing to participate in therapy this PM    Transfers   Sit to Stand 4  Minimal assistance   Additional items Assist x 1; Armrests; Increased time required;Verbal cues   Stand to Sit 4  Minimal assistance   Additional items Assist x 1; Armrests; Increased time required;Verbal cues   Toilet transfer 4  Minimal assistance   Additional items Assist x 1; Armrests; Increased time required;Commode   Ambulation/Elevation   Gait pattern Decreased foot clearance; Forward Flexion; Wide ROBBY; Improper Weight shift; Short stride; Step to;Excessively slow; Inconsistent gerard   Gait Assistance 4  Minimal assist   Additional items Tactile cues; Verbal cues; Assist x 1  (with second present for safety)   Assistive Device Rolling walker   Distance 15' x 2 with seated resting period in between   Balance   Static Sitting Fair +   Dynamic Sitting Fair   Static Standing Fair -   Dynamic Standing Poor +   Ambulatory Poor +   Endurance Deficit   Endurance Deficit Yes   Endurance Deficit Description fatigue   Activity Tolerance   Activity Tolerance Patient limited by fatigue   Medical Staff [facility],  [facility] Celestine Yes   Exercises   THR Sitting;10 reps;AAROM; Bilateral   Assessment   Prognosis Fair   Problem List Decreased range of motion;Decreased strength;Decreased endurance; Impaired balance;Decreased mobility;Obesity; Decreased skin integrity;Orthopedic restrictions;Pain; Impaired judgement;Decreased cognition;Decreased safety awareness   Assessment Pt  seated in bedside chair upon my arrival  Performance of HEP with cues provided for proper completion  Progressed with transfers requiring A of therapist with cues for hand placement/technique  Progressed with an amb  trial with use of RW and A of therapist with cues for LE sequencing  Pt  required a seated resting period in between gait trials  Pt  returned to seated in bedside chair at end of treatment session  PT will continue to recommend d/c to rehab when medically stable for continued improvement of noted impairments above  Goals   Patient Goals To go to rehab  STG Expiration Date 02/19/20   PT Treatment Day 1   Plan   Treatment/Interventions Functional transfer training;LE strengthening/ROM; Therapeutic exercise; Endurance training;Gait training;Spoke to case management;Spoke to nursing;OT   Progress Slow progress, decreased activity tolerance   PT Frequency Other (Comment)  (3-5x/wk)   Recommendation   Recommendation Short-term skilled PT   Equipment Recommended Walker; Wheelchair  (RW)   PT - OK to Discharge Yes  (if d/c to rehab when medically stable )     Caesar Brake, PTA

## 2020-02-08 LAB
ANION GAP SERPL CALCULATED.3IONS-SCNC: 12 MMOL/L (ref 4–13)
BUN SERPL-MCNC: 21 MG/DL (ref 5–25)
CALCIUM SERPL-MCNC: 9.1 MG/DL (ref 8.3–10.1)
CHLORIDE SERPL-SCNC: 97 MMOL/L (ref 100–108)
CO2 SERPL-SCNC: 27 MMOL/L (ref 21–32)
CREAT SERPL-MCNC: 1.15 MG/DL (ref 0.6–1.3)
GFR SERPL CREATININE-BSD FRML MDRD: 69 ML/MIN/1.73SQ M
GLUCOSE SERPL-MCNC: 159 MG/DL (ref 65–140)
GLUCOSE SERPL-MCNC: 200 MG/DL (ref 65–140)
GLUCOSE SERPL-MCNC: 204 MG/DL (ref 65–140)
GLUCOSE SERPL-MCNC: 218 MG/DL (ref 65–140)
GLUCOSE SERPL-MCNC: 224 MG/DL (ref 65–140)
POTASSIUM SERPL-SCNC: 3.9 MMOL/L (ref 3.5–5.3)
SODIUM SERPL-SCNC: 136 MMOL/L (ref 136–145)

## 2020-02-08 PROCEDURE — 80048 BASIC METABOLIC PNL TOTAL CA: CPT | Performed by: INTERNAL MEDICINE

## 2020-02-08 PROCEDURE — 82948 REAGENT STRIP/BLOOD GLUCOSE: CPT

## 2020-02-08 PROCEDURE — 99232 SBSQ HOSP IP/OBS MODERATE 35: CPT | Performed by: INTERNAL MEDICINE

## 2020-02-08 RX ORDER — METOLAZONE 5 MG/1
5 TABLET ORAL DAILY
Status: DISCONTINUED | OUTPATIENT
Start: 2020-02-09 | End: 2020-02-10

## 2020-02-08 RX ADMIN — INSULIN LISPRO 2 UNITS: 100 INJECTION, SOLUTION INTRAVENOUS; SUBCUTANEOUS at 16:23

## 2020-02-08 RX ADMIN — INSULIN LISPRO 2 UNITS: 100 INJECTION, SOLUTION INTRAVENOUS; SUBCUTANEOUS at 22:07

## 2020-02-08 RX ADMIN — TRIAMCINOLONE ACETONIDE 1 APPLICATION: 1 CREAM TOPICAL at 18:01

## 2020-02-08 RX ADMIN — METOCLOPRAMIDE HYDROCHLORIDE 5 MG: 10 TABLET ORAL at 08:39

## 2020-02-08 RX ADMIN — TRIAMCINOLONE ACETONIDE 0.1 APPLICATION: 1 CREAM TOPICAL at 08:40

## 2020-02-08 RX ADMIN — FINASTERIDE 5 MG: 5 TABLET, FILM COATED ORAL at 08:38

## 2020-02-08 RX ADMIN — FUROSEMIDE 80 MG: 10 INJECTION, SOLUTION INTRAMUSCULAR; INTRAVENOUS at 08:40

## 2020-02-08 RX ADMIN — PANTOPRAZOLE SODIUM 40 MG: 40 TABLET, DELAYED RELEASE ORAL at 06:12

## 2020-02-08 RX ADMIN — HEPARIN SODIUM 5000 UNITS: 5000 INJECTION INTRAVENOUS; SUBCUTANEOUS at 14:33

## 2020-02-08 RX ADMIN — ATORVASTATIN CALCIUM 80 MG: 80 TABLET, FILM COATED ORAL at 16:10

## 2020-02-08 RX ADMIN — INSULIN LISPRO 1 UNITS: 100 INJECTION, SOLUTION INTRAVENOUS; SUBCUTANEOUS at 08:40

## 2020-02-08 RX ADMIN — POTASSIUM CHLORIDE 20 MEQ: 1500 TABLET, EXTENDED RELEASE ORAL at 08:38

## 2020-02-08 RX ADMIN — FUROSEMIDE 80 MG: 10 INJECTION, SOLUTION INTRAMUSCULAR; INTRAVENOUS at 16:11

## 2020-02-08 RX ADMIN — INSULIN GLARGINE 44 UNITS: 100 INJECTION, SOLUTION SUBCUTANEOUS at 08:41

## 2020-02-08 RX ADMIN — HEPARIN SODIUM 5000 UNITS: 5000 INJECTION INTRAVENOUS; SUBCUTANEOUS at 22:07

## 2020-02-08 RX ADMIN — INSULIN LISPRO 1 UNITS: 100 INJECTION, SOLUTION INTRAVENOUS; SUBCUTANEOUS at 12:52

## 2020-02-08 RX ADMIN — CLOPIDOGREL 75 MG: 75 TABLET, FILM COATED ORAL at 08:40

## 2020-02-08 RX ADMIN — INSULIN GLARGINE 44 UNITS: 100 INJECTION, SOLUTION SUBCUTANEOUS at 18:01

## 2020-02-08 RX ADMIN — TAMSULOSIN HYDROCHLORIDE 0.4 MG: 0.4 CAPSULE ORAL at 16:10

## 2020-02-08 RX ADMIN — HEPARIN SODIUM 5000 UNITS: 5000 INJECTION INTRAVENOUS; SUBCUTANEOUS at 06:12

## 2020-02-08 NOTE — PROGRESS NOTES
Progress Note - Cornelio Diaz 60 y o  male MRN: [id number]    Unit/Bed#: [facility] 68 2 -02 Encounter: [id number]      Subjective: The patient feels reasonably well  He denies chest pain, shortness of breath, nausea, or vomiting  He slept well  He has not had a bowel movement several days  Physical Exam:   Temp:  [97 5 °F (36 4 °C)-97 8 °F (36 6 °C)] 97 7 °F (36 5 °C)  HR:  [69-83] 82  Resp:  [18-19] 18  BP: (110-169)/(61-83) 127/70    Gen:  Well-developed, obese, in no distress  Neck:  Supple  No lymphadenopathy, goiter, or bruit  Heart:  Regular rhythm  I heard no murmur, gallop, or rub  Lungs:  Clear to auscultation and percussion  Diminished breath sounds at the bases  No wheezing, rales, or rhonchi    Abd: Soft with active bowel sounds  No mass, tenderness, or organomegaly  Extremities:  +3 edema persists  No calf tenderness  Neuro:  Alert and oriented  No focal sign  Skin:  Warm and dry      LABS:   CMP:   Lab Results   Component Value Date    SODIUM 136 02/08/2020    K 3 9 02/08/2020    CL 97 (L) 02/08/2020    CO2 27 02/08/2020    BUN 21 02/08/2020    CREATININE 1 15 02/08/2020    CALCIUM 9 1 02/08/2020    EGFR 69 02/08/2020         Assessment/Plan:  1  Acute on chronic diastolic congestive heart failure  2  Coronary artery disease  3  Type 2 diabetes with neuropathy  4  Peripheral vascular disease  5  Patient status post left BKA  6  Elephantiasis nostris verrucosa    The patient is on furosemide 80 mg twice daily by vein  Despite this, his weight has not changed and his physical examination x-ray continues to suggest significant fluid overload  He will be given metolazone tomorrow        VTE Pharmacologic Prophylaxis: Heparin  VTE Mechanical Prophylaxis: reason for no mechanical VTE prophylaxis CHF

## 2020-02-08 NOTE — PLAN OF CARE
Problem: Potential for Falls  Goal: Patient will remain free of falls  Description  INTERVENTIONS:  - Assess patient frequently for physical needs  -  Identify cognitive and physical deficits and behaviors that affect risk of falls    -  [place] fall precautions as indicated by assessment   - Educate patient/family on patient safety including physical limitations  - Instruct patient to call for assistance with activity based on assessment  - Modify environment to reduce risk of injury  - Consider OT/PT consult to assist with strengthening/mobility  Outcome: Progressing     Problem: Prexisting or High Potential for Compromised Skin Integrity  Goal: Skin integrity is maintained or improved  Description  INTERVENTIONS:  - Identify patients at risk for skin breakdown  - Assess and monitor skin integrity  - Assess and monitor nutrition and hydration status  - Monitor labs   - Assess for incontinence   - Turn and reposition patient  - Assist with mobility/ambulation  - Relieve pressure over bony prominences  - Avoid friction and shearing  - Provide appropriate hygiene as needed including keeping skin clean and dry  - Evaluate need for skin moisturizer/barrier cream  - Collaborate with interdisciplinary team   - Patient/family teaching  - Consider wound care consult   Outcome: Progressing     Problem: CARDIOVASCULAR - ADULT  Goal: Maintains optimal cardiac output and hemodynamic stability  Description  INTERVENTIONS:  - Monitor I/O, vital signs and rhythm  - Monitor for S/S and trends of decreased cardiac output  - Administer and titrate ordered vasoactive medications to optimize hemodynamic stability  - Assess quality of pulses, skin color and temperature  - Assess for signs of decreased coronary artery perfusion  - Instruct patient to report change in severity of symptoms  Outcome: Progressing  Goal: Absence of cardiac dysrhythmias or at baseline rhythm  Description  INTERVENTIONS:  - Continuous cardiac monitoring, vital signs, obtain 12 lead EKG if ordered  - Administer antiarrhythmic and heart rate control medications as ordered  - Monitor electrolytes and administer replacement therapy as ordered  Outcome: Progressing     Problem: RESPIRATORY - ADULT  Goal: Achieves optimal ventilation and oxygenation  Description  INTERVENTIONS:  - Assess for changes in respiratory status  - Assess for changes in mentation and behavior  - Position to facilitate oxygenation and minimize respiratory effort  - Oxygen administered by appropriate delivery if ordered  - Initiate smoking cessation education as indicated  - Encourage broncho-pulmonary hygiene including cough, deep breathe, Incentive Spirometry  - Assess the need for suctioning and aspirate as needed  - Assess and instruct to report SOB or any respiratory difficulty  - Respiratory Therapy support as indicated  Outcome: Progressing     Problem: SKIN/TISSUE INTEGRITY - ADULT  Goal: Skin integrity remains intact  Description  INTERVENTIONS  - Identify patients at risk for skin breakdown  - Assess and monitor skin integrity  - Assess and monitor nutrition and hydration status  - Monitor labs (i e  albumin)  - Assess for incontinence   - Turn and reposition patient  - Assist with mobility/ambulation  - Relieve pressure over bony prominences  - Avoid friction and shearing  - Provide appropriate hygiene as needed including keeping skin clean and dry  - Evaluate need for skin moisturizer/barrier cream  - Collaborate with interdisciplinary team (i e  Nutrition, Rehabilitation, etc )   - Patient/family teaching  Outcome: Progressing     Problem: MUSCULOSKELETAL - ADULT  Goal: Maintain or return mobility to safest level of function  Description  INTERVENTIONS:  - Assess patient's ability to carry out ADLs; assess patient's baseline for ADL function and identify physical deficits which impact ability to perform ADLs (bathing, care of mouth/teeth, toileting, grooming, dressing, etc )  - Assess/evaluate cause of self-care deficits   - Assess range of motion  - Assess patient's mobility  - Assess patient's need for assistive devices and provide as appropriate  - Encourage maximum independence but intervene and supervise when necessary  - Involve family in performance of ADLs  - Assess for home care needs following discharge   - Consider OT consult to assist with ADL evaluation and planning for discharge  - Provide patient education as appropriate  Outcome: Progressing     Problem: PAIN - ADULT  Goal: Verbalizes/displays adequate comfort level or baseline comfort level  Description  Interventions:  - Encourage patient to monitor pain and request assistance  - Assess pain using appropriate pain scale  - Administer analgesics based on type and severity of pain and evaluate response  - Implement non-pharmacological measures as appropriate and evaluate response  - Consider cultural and social influences on pain and pain management  - Notify physician/advanced practitioner if interventions unsuccessful or patient reports new pain  Outcome: Progressing     Problem: INFECTION - ADULT  Goal: Absence or prevention of progression during hospitalization  Description  INTERVENTIONS:  - Assess and monitor for signs and symptoms of infection  - Monitor lab/diagnostic results  - Monitor all insertion sites, i e  indwelling lines, tubes, and drains  - Monitor endotracheal if appropriate and nasal secretions for changes in amount and color  - [place] appropriate cooling/warming therapies per order  - Administer medications as ordered  - Instruct and encourage patient and family to use good hand hygiene technique  - Identify and instruct in appropriate isolation precautions for identified infection/condition  Outcome: Progressing     Problem: DISCHARGE PLANNING  Goal: Discharge to home or other facility with appropriate resources  Description  INTERVENTIONS:  - Identify barriers to discharge w/patient and caregiver  - Arrange for needed discharge resources and transportation as appropriate  - Identify discharge learning needs (meds, wound care, etc )  - Arrange for interpretive services to assist at discharge as needed  - Refer to Case Management Department for coordinating discharge planning if the patient needs post-hospital services based on physician/advanced practitioner order or complex needs related to functional status, cognitive ability, or social support system  Outcome: Progressing     Problem: Knowledge Deficit  Goal: Patient/family/caregiver demonstrates understanding of disease process, treatment plan, medications, and discharge instructions  Description  Complete learning assessment and assess knowledge base  Interventions:  - Provide teaching at level of understanding  - Provide teaching via preferred learning methods  Outcome: Progressing     Problem: Nutrition/Hydration-ADULT  Goal: Nutrient/Hydration intake appropriate for improving, restoring or maintaining nutritional needs  Description  Monitor and assess patient's nutrition/hydration status for malnutrition  Collaborate with interdisciplinary team and initiate plan and interventions as ordered  Monitor patient's weight and dietary intake as ordered or per policy  Utilize nutrition screening tool and intervene as necessary  Determine patient's food preferences and provide high-protein, high-caloric foods as appropriate       INTERVENTIONS:  - Monitor oral intake, urinary output, labs, and treatment plans  - Assess nutrition and hydration status and recommend course of action  - Evaluate amount of meals eaten  - Assist patient with eating if necessary   - Allow adequate time for meals  - Recommend/ encourage appropriate diets, oral nutritional supplements, and vitamin/mineral supplements  - Order, calculate, and assess calorie counts as needed  - Recommend, monitor, and adjust tube feedings and TPN/PPN based on assessed needs  - Assess need for intravenous fluids  - Provide specific nutrition/hydration education as appropriate  - Include patient/family/caregiver in decisions related to nutrition  Outcome: Progressing

## 2020-02-08 NOTE — PLAN OF CARE
Problem: Potential for Falls  Goal: Patient will remain free of falls  Description  INTERVENTIONS:  - Assess patient frequently for physical needs  -  Identify cognitive and physical deficits and behaviors that affect risk of falls    -  [place] fall precautions as indicated by assessment   - Educate patient/family on patient safety including physical limitations  - Instruct patient to call for assistance with activity based on assessment  - Modify environment to reduce risk of injury  - Consider OT/PT consult to assist with strengthening/mobility  Outcome: Progressing     Problem: Prexisting or High Potential for Compromised Skin Integrity  Goal: Skin integrity is maintained or improved  Description  INTERVENTIONS:  - Identify patients at risk for skin breakdown  - Assess and monitor skin integrity  - Assess and monitor nutrition and hydration status  - Monitor labs   - Assess for incontinence   - Turn and reposition patient  - Assist with mobility/ambulation  - Relieve pressure over bony prominences  - Avoid friction and shearing  - Provide appropriate hygiene as needed including keeping skin clean and dry  - Evaluate need for skin moisturizer/barrier cream  - Collaborate with interdisciplinary team   - Patient/family teaching  - Consider wound care consult   Outcome: Progressing     Problem: CARDIOVASCULAR - ADULT  Goal: Maintains optimal cardiac output and hemodynamic stability  Description  INTERVENTIONS:  - Monitor I/O, vital signs and rhythm  - Monitor for S/S and trends of decreased cardiac output  - Administer and titrate ordered vasoactive medications to optimize hemodynamic stability  - Assess quality of pulses, skin color and temperature  - Assess for signs of decreased coronary artery perfusion  - Instruct patient to report change in severity of symptoms  Outcome: Progressing  Goal: Absence of cardiac dysrhythmias or at baseline rhythm  Description  INTERVENTIONS:  - Continuous cardiac monitoring, vital signs, obtain 12 lead EKG if ordered  - Administer antiarrhythmic and heart rate control medications as ordered  - Monitor electrolytes and administer replacement therapy as ordered  Outcome: Progressing     Problem: RESPIRATORY - ADULT  Goal: Achieves optimal ventilation and oxygenation  Description  INTERVENTIONS:  - Assess for changes in respiratory status  - Assess for changes in mentation and behavior  - Position to facilitate oxygenation and minimize respiratory effort  - Oxygen administered by appropriate delivery if ordered  - Initiate smoking cessation education as indicated  - Encourage broncho-pulmonary hygiene including cough, deep breathe, Incentive Spirometry  - Assess the need for suctioning and aspirate as needed  - Assess and instruct to report SOB or any respiratory difficulty  - Respiratory Therapy support as indicated  Outcome: Progressing     Problem: SKIN/TISSUE INTEGRITY - ADULT  Goal: Skin integrity remains intact  Description  INTERVENTIONS  - Identify patients at risk for skin breakdown  - Assess and monitor skin integrity  - Assess and monitor nutrition and hydration status  - Monitor labs (i e  albumin)  - Assess for incontinence   - Turn and reposition patient  - Assist with mobility/ambulation  - Relieve pressure over bony prominences  - Avoid friction and shearing  - Provide appropriate hygiene as needed including keeping skin clean and dry  - Evaluate need for skin moisturizer/barrier cream  - Collaborate with interdisciplinary team (i e  Nutrition, Rehabilitation, etc )   - Patient/family teaching  Outcome: Progressing     Problem: MUSCULOSKELETAL - ADULT  Goal: Maintain or return mobility to safest level of function  Description  INTERVENTIONS:  - Assess patient's ability to carry out ADLs; assess patient's baseline for ADL function and identify physical deficits which impact ability to perform ADLs (bathing, care of mouth/teeth, toileting, grooming, dressing, etc )  - Assess/evaluate cause of self-care deficits   - Assess range of motion  - Assess patient's mobility  - Assess patient's need for assistive devices and provide as appropriate  - Encourage maximum independence but intervene and supervise when necessary  - Involve family in performance of ADLs  - Assess for home care needs following discharge   - Consider OT consult to assist with ADL evaluation and planning for discharge  - Provide patient education as appropriate  Outcome: Progressing     Problem: PAIN - ADULT  Goal: Verbalizes/displays adequate comfort level or baseline comfort level  Description  Interventions:  - Encourage patient to monitor pain and request assistance  - Assess pain using appropriate pain scale  - Administer analgesics based on type and severity of pain and evaluate response  - Implement non-pharmacological measures as appropriate and evaluate response  - Consider cultural and social influences on pain and pain management  - Notify physician/advanced practitioner if interventions unsuccessful or patient reports new pain  Outcome: Progressing     Problem: INFECTION - ADULT  Goal: Absence or prevention of progression during hospitalization  Description  INTERVENTIONS:  - Assess and monitor for signs and symptoms of infection  - Monitor lab/diagnostic results  - Monitor all insertion sites, i e  indwelling lines, tubes, and drains  - Monitor endotracheal if appropriate and nasal secretions for changes in amount and color  - [place] appropriate cooling/warming therapies per order  - Administer medications as ordered  - Instruct and encourage patient and family to use good hand hygiene technique  - Identify and instruct in appropriate isolation precautions for identified infection/condition  Outcome: Progressing     Problem: DISCHARGE PLANNING  Goal: Discharge to home or other facility with appropriate resources  Description  INTERVENTIONS:  - Identify barriers to discharge w/patient and caregiver  - Arrange for needed discharge resources and transportation as appropriate  - Identify discharge learning needs (meds, wound care, etc )  - Arrange for interpretive services to assist at discharge as needed  - Refer to Case Management Department for coordinating discharge planning if the patient needs post-hospital services based on physician/advanced practitioner order or complex needs related to functional status, cognitive ability, or social support system  Outcome: Progressing     Problem: Knowledge Deficit  Goal: Patient/family/caregiver demonstrates understanding of disease process, treatment plan, medications, and discharge instructions  Description  Complete learning assessment and assess knowledge base  Interventions:  - Provide teaching at level of understanding  - Provide teaching via preferred learning methods  Outcome: Progressing     Problem: Nutrition/Hydration-ADULT  Goal: Nutrient/Hydration intake appropriate for improving, restoring or maintaining nutritional needs  Description  Monitor and assess patient's nutrition/hydration status for malnutrition  Collaborate with interdisciplinary team and initiate plan and interventions as ordered  Monitor patient's weight and dietary intake as ordered or per policy  Utilize nutrition screening tool and intervene as necessary  Determine patient's food preferences and provide high-protein, high-caloric foods as appropriate       INTERVENTIONS:  - Monitor oral intake, urinary output, labs, and treatment plans  - Assess nutrition and hydration status and recommend course of action  - Evaluate amount of meals eaten  - Assist patient with eating if necessary   - Allow adequate time for meals  - Recommend/ encourage appropriate diets, oral nutritional supplements, and vitamin/mineral supplements  - Order, calculate, and assess calorie counts as needed  - Recommend, monitor, and adjust tube feedings and TPN/PPN based on assessed needs  - Assess need for intravenous fluids  - Provide specific nutrition/hydration education as appropriate  - Include patient/family/caregiver in decisions related to nutrition  Outcome: Progressing

## 2020-02-09 PROBLEM — E66.01 CLASS 2 SEVERE OBESITY DUE TO EXCESS CALORIES WITH SERIOUS COMORBIDITY AND BODY MASS INDEX (BMI) OF 35.0 TO 35.9 IN ADULT (HCC): Status: ACTIVE | Noted: 2018-03-19

## 2020-02-09 PROBLEM — E66.812 CLASS 2 SEVERE OBESITY DUE TO EXCESS CALORIES WITH SERIOUS COMORBIDITY AND BODY MASS INDEX (BMI) OF 35.0 TO 35.9 IN ADULT (HCC): Status: ACTIVE | Noted: 2018-03-19

## 2020-02-09 LAB
ANION GAP SERPL CALCULATED.3IONS-SCNC: 10 MMOL/L (ref 4–13)
BUN SERPL-MCNC: 22 MG/DL (ref 5–25)
CALCIUM SERPL-MCNC: 8.8 MG/DL (ref 8.3–10.1)
CHLORIDE SERPL-SCNC: 97 MMOL/L (ref 100–108)
CO2 SERPL-SCNC: 29 MMOL/L (ref 21–32)
CREAT SERPL-MCNC: 1.15 MG/DL (ref 0.6–1.3)
GFR SERPL CREATININE-BSD FRML MDRD: 69 ML/MIN/1.73SQ M
GLUCOSE SERPL-MCNC: 158 MG/DL (ref 65–140)
GLUCOSE SERPL-MCNC: 184 MG/DL (ref 65–140)
GLUCOSE SERPL-MCNC: 248 MG/DL (ref 65–140)
GLUCOSE SERPL-MCNC: 276 MG/DL (ref 65–140)
GLUCOSE SERPL-MCNC: 322 MG/DL (ref 65–140)
POTASSIUM SERPL-SCNC: 3.6 MMOL/L (ref 3.5–5.3)
SODIUM SERPL-SCNC: 136 MMOL/L (ref 136–145)

## 2020-02-09 PROCEDURE — 82948 REAGENT STRIP/BLOOD GLUCOSE: CPT

## 2020-02-09 PROCEDURE — 80048 BASIC METABOLIC PNL TOTAL CA: CPT | Performed by: INTERNAL MEDICINE

## 2020-02-09 PROCEDURE — 99232 SBSQ HOSP IP/OBS MODERATE 35: CPT | Performed by: INTERNAL MEDICINE

## 2020-02-09 RX ADMIN — TAMSULOSIN HYDROCHLORIDE 0.4 MG: 0.4 CAPSULE ORAL at 17:12

## 2020-02-09 RX ADMIN — INSULIN LISPRO 3 UNITS: 100 INJECTION, SOLUTION INTRAVENOUS; SUBCUTANEOUS at 21:30

## 2020-02-09 RX ADMIN — PANTOPRAZOLE SODIUM 40 MG: 40 TABLET, DELAYED RELEASE ORAL at 05:05

## 2020-02-09 RX ADMIN — TRIAMCINOLONE ACETONIDE: 1 CREAM TOPICAL at 10:34

## 2020-02-09 RX ADMIN — HEPARIN SODIUM 5000 UNITS: 5000 INJECTION INTRAVENOUS; SUBCUTANEOUS at 05:05

## 2020-02-09 RX ADMIN — INSULIN LISPRO 3 UNITS: 100 INJECTION, SOLUTION INTRAVENOUS; SUBCUTANEOUS at 17:13

## 2020-02-09 RX ADMIN — INSULIN GLARGINE 44 UNITS: 100 INJECTION, SOLUTION SUBCUTANEOUS at 09:06

## 2020-02-09 RX ADMIN — POTASSIUM CHLORIDE 20 MEQ: 1500 TABLET, EXTENDED RELEASE ORAL at 09:02

## 2020-02-09 RX ADMIN — TRIAMCINOLONE ACETONIDE: 1 CREAM TOPICAL at 17:12

## 2020-02-09 RX ADMIN — INSULIN LISPRO 1 UNITS: 100 INJECTION, SOLUTION INTRAVENOUS; SUBCUTANEOUS at 09:04

## 2020-02-09 RX ADMIN — FUROSEMIDE 80 MG: 10 INJECTION, SOLUTION INTRAMUSCULAR; INTRAVENOUS at 10:31

## 2020-02-09 RX ADMIN — FUROSEMIDE 80 MG: 10 INJECTION, SOLUTION INTRAMUSCULAR; INTRAVENOUS at 17:00

## 2020-02-09 RX ADMIN — CLOPIDOGREL 75 MG: 75 TABLET, FILM COATED ORAL at 09:01

## 2020-02-09 RX ADMIN — HEPARIN SODIUM 5000 UNITS: 5000 INJECTION INTRAVENOUS; SUBCUTANEOUS at 13:43

## 2020-02-09 RX ADMIN — HEPARIN SODIUM 5000 UNITS: 5000 INJECTION INTRAVENOUS; SUBCUTANEOUS at 21:30

## 2020-02-09 RX ADMIN — INSULIN GLARGINE 44 UNITS: 100 INJECTION, SOLUTION SUBCUTANEOUS at 17:12

## 2020-02-09 RX ADMIN — ATORVASTATIN CALCIUM 80 MG: 80 TABLET, FILM COATED ORAL at 17:12

## 2020-02-09 RX ADMIN — FINASTERIDE 5 MG: 5 TABLET, FILM COATED ORAL at 10:11

## 2020-02-09 RX ADMIN — INSULIN LISPRO 2 UNITS: 100 INJECTION, SOLUTION INTRAVENOUS; SUBCUTANEOUS at 12:15

## 2020-02-09 RX ADMIN — METOLAZONE 5 MG: 5 TABLET ORAL at 09:01

## 2020-02-09 RX ADMIN — METOCLOPRAMIDE HYDROCHLORIDE 5 MG: 10 TABLET ORAL at 09:02

## 2020-02-09 NOTE — PLAN OF CARE
Problem: Potential for Falls  Goal: Patient will remain free of falls  Description  INTERVENTIONS:  - Assess patient frequently for physical needs  -  Identify cognitive and physical deficits and behaviors that affect risk of falls    -  [place] fall precautions as indicated by assessment   - Educate patient/family on patient safety including physical limitations  - Instruct patient to call for assistance with activity based on assessment  - Modify environment to reduce risk of injury  - Consider OT/PT consult to assist with strengthening/mobility  Outcome: Progressing     Problem: Prexisting or High Potential for Compromised Skin Integrity  Goal: Skin integrity is maintained or improved  Description  INTERVENTIONS:  - Identify patients at risk for skin breakdown  - Assess and monitor skin integrity  - Assess and monitor nutrition and hydration status  - Monitor labs   - Assess for incontinence   - Turn and reposition patient  - Assist with mobility/ambulation  - Relieve pressure over bony prominences  - Avoid friction and shearing  - Provide appropriate hygiene as needed including keeping skin clean and dry  - Evaluate need for skin moisturizer/barrier cream  - Collaborate with interdisciplinary team   - Patient/family teaching  - Consider wound care consult   Outcome: Progressing     Problem: CARDIOVASCULAR - ADULT  Goal: Maintains optimal cardiac output and hemodynamic stability  Description  INTERVENTIONS:  - Monitor I/O, vital signs and rhythm  - Monitor for S/S and trends of decreased cardiac output  - Administer and titrate ordered vasoactive medications to optimize hemodynamic stability  - Assess quality of pulses, skin color and temperature  - Assess for signs of decreased coronary artery perfusion  - Instruct patient to report change in severity of symptoms  Outcome: Progressing  Goal: Absence of cardiac dysrhythmias or at baseline rhythm  Description  INTERVENTIONS:  - Continuous cardiac monitoring, vital signs, obtain 12 lead EKG if ordered  - Administer antiarrhythmic and heart rate control medications as ordered  - Monitor electrolytes and administer replacement therapy as ordered  Outcome: Progressing     Problem: RESPIRATORY - ADULT  Goal: Achieves optimal ventilation and oxygenation  Description  INTERVENTIONS:  - Assess for changes in respiratory status  - Assess for changes in mentation and behavior  - Position to facilitate oxygenation and minimize respiratory effort  - Oxygen administered by appropriate delivery if ordered  - Initiate smoking cessation education as indicated  - Encourage broncho-pulmonary hygiene including cough, deep breathe, Incentive Spirometry  - Assess the need for suctioning and aspirate as needed  - Assess and instruct to report SOB or any respiratory difficulty  - Respiratory Therapy support as indicated  Outcome: Progressing     Problem: SKIN/TISSUE INTEGRITY - ADULT  Goal: Skin integrity remains intact  Description  INTERVENTIONS  - Identify patients at risk for skin breakdown  - Assess and monitor skin integrity  - Assess and monitor nutrition and hydration status  - Monitor labs (i e  albumin)  - Assess for incontinence   - Turn and reposition patient  - Assist with mobility/ambulation  - Relieve pressure over bony prominences  - Avoid friction and shearing  - Provide appropriate hygiene as needed including keeping skin clean and dry  - Evaluate need for skin moisturizer/barrier cream  - Collaborate with interdisciplinary team (i e  Nutrition, Rehabilitation, etc )   - Patient/family teaching  Outcome: Progressing     Problem: MUSCULOSKELETAL - ADULT  Goal: Maintain or return mobility to safest level of function  Description  INTERVENTIONS:  - Assess patient's ability to carry out ADLs; assess patient's baseline for ADL function and identify physical deficits which impact ability to perform ADLs (bathing, care of mouth/teeth, toileting, grooming, dressing, etc )  - Assess/evaluate cause of self-care deficits   - Assess range of motion  - Assess patient's mobility  - Assess patient's need for assistive devices and provide as appropriate  - Encourage maximum independence but intervene and supervise when necessary  - Involve family in performance of ADLs  - Assess for home care needs following discharge   - Consider OT consult to assist with ADL evaluation and planning for discharge  - Provide patient education as appropriate  Outcome: Progressing     Problem: PAIN - ADULT  Goal: Verbalizes/displays adequate comfort level or baseline comfort level  Description  Interventions:  - Encourage patient to monitor pain and request assistance  - Assess pain using appropriate pain scale  - Administer analgesics based on type and severity of pain and evaluate response  - Implement non-pharmacological measures as appropriate and evaluate response  - Consider cultural and social influences on pain and pain management  - Notify physician/advanced practitioner if interventions unsuccessful or patient reports new pain  Outcome: Progressing     Problem: INFECTION - ADULT  Goal: Absence or prevention of progression during hospitalization  Description  INTERVENTIONS:  - Assess and monitor for signs and symptoms of infection  - Monitor lab/diagnostic results  - Monitor all insertion sites, i e  indwelling lines, tubes, and drains  - Monitor endotracheal if appropriate and nasal secretions for changes in amount and color  - [place] appropriate cooling/warming therapies per order  - Administer medications as ordered  - Instruct and encourage patient and family to use good hand hygiene technique  - Identify and instruct in appropriate isolation precautions for identified infection/condition  Outcome: Progressing     Problem: DISCHARGE PLANNING  Goal: Discharge to home or other facility with appropriate resources  Description  INTERVENTIONS:  - Identify barriers to discharge w/patient and caregiver  - Arrange for needed discharge resources and transportation as appropriate  - Identify discharge learning needs (meds, wound care, etc )  - Arrange for interpretive services to assist at discharge as needed  - Refer to Case Management Department for coordinating discharge planning if the patient needs post-hospital services based on physician/advanced practitioner order or complex needs related to functional status, cognitive ability, or social support system  Outcome: Progressing     Problem: Knowledge Deficit  Goal: Patient/family/caregiver demonstrates understanding of disease process, treatment plan, medications, and discharge instructions  Description  Complete learning assessment and assess knowledge base  Interventions:  - Provide teaching at level of understanding  - Provide teaching via preferred learning methods  Outcome: Progressing     Problem: Nutrition/Hydration-ADULT  Goal: Nutrient/Hydration intake appropriate for improving, restoring or maintaining nutritional needs  Description  Monitor and assess patient's nutrition/hydration status for malnutrition  Collaborate with interdisciplinary team and initiate plan and interventions as ordered  Monitor patient's weight and dietary intake as ordered or per policy  Utilize nutrition screening tool and intervene as necessary  Determine patient's food preferences and provide high-protein, high-caloric foods as appropriate       INTERVENTIONS:  - Monitor oral intake, urinary output, labs, and treatment plans  - Assess nutrition and hydration status and recommend course of action  - Evaluate amount of meals eaten  - Assist patient with eating if necessary   - Allow adequate time for meals  - Recommend/ encourage appropriate diets, oral nutritional supplements, and vitamin/mineral supplements  - Order, calculate, and assess calorie counts as needed  - Recommend, monitor, and adjust tube feedings and TPN/PPN based on assessed needs  - Assess need for intravenous fluids  - Provide specific nutrition/hydration education as appropriate  - Include patient/family/caregiver in decisions related to nutrition  Outcome: Progressing

## 2020-02-09 NOTE — PLAN OF CARE
Problem: Potential for Falls  Goal: Patient will remain free of falls  Description  INTERVENTIONS:  - Assess patient frequently for physical needs  -  Identify cognitive and physical deficits and behaviors that affect risk of falls    -  [place] fall precautions as indicated by assessment   - Educate patient/family on patient safety including physical limitations  - Instruct patient to call for assistance with activity based on assessment  - Modify environment to reduce risk of injury  - Consider OT/PT consult to assist with strengthening/mobility  Outcome: Progressing     Problem: Prexisting or High Potential for Compromised Skin Integrity  Goal: Skin integrity is maintained or improved  Description  INTERVENTIONS:  - Identify patients at risk for skin breakdown  - Assess and monitor skin integrity  - Assess and monitor nutrition and hydration status  - Monitor labs   - Assess for incontinence   - Turn and reposition patient  - Assist with mobility/ambulation  - Relieve pressure over bony prominences  - Avoid friction and shearing  - Provide appropriate hygiene as needed including keeping skin clean and dry  - Evaluate need for skin moisturizer/barrier cream  - Collaborate with interdisciplinary team   - Patient/family teaching  - Consider wound care consult   Outcome: Progressing     Problem: CARDIOVASCULAR - ADULT  Goal: Maintains optimal cardiac output and hemodynamic stability  Description  INTERVENTIONS:  - Monitor I/O, vital signs and rhythm  - Monitor for S/S and trends of decreased cardiac output  - Administer and titrate ordered vasoactive medications to optimize hemodynamic stability  - Assess quality of pulses, skin color and temperature  - Assess for signs of decreased coronary artery perfusion  - Instruct patient to report change in severity of symptoms  Outcome: Progressing  Goal: Absence of cardiac dysrhythmias or at baseline rhythm  Description  INTERVENTIONS:  - Continuous cardiac monitoring, vital signs, obtain 12 lead EKG if ordered  - Administer antiarrhythmic and heart rate control medications as ordered  - Monitor electrolytes and administer replacement therapy as ordered  Outcome: Progressing     Problem: RESPIRATORY - ADULT  Goal: Achieves optimal ventilation and oxygenation  Description  INTERVENTIONS:  - Assess for changes in respiratory status  - Assess for changes in mentation and behavior  - Position to facilitate oxygenation and minimize respiratory effort  - Oxygen administered by appropriate delivery if ordered  - Initiate smoking cessation education as indicated  - Encourage broncho-pulmonary hygiene including cough, deep breathe, Incentive Spirometry  - Assess the need for suctioning and aspirate as needed  - Assess and instruct to report SOB or any respiratory difficulty  - Respiratory Therapy support as indicated  Outcome: Progressing     Problem: SKIN/TISSUE INTEGRITY - ADULT  Goal: Skin integrity remains intact  Description  INTERVENTIONS  - Identify patients at risk for skin breakdown  - Assess and monitor skin integrity  - Assess and monitor nutrition and hydration status  - Monitor labs (i e  albumin)  - Assess for incontinence   - Turn and reposition patient  - Assist with mobility/ambulation  - Relieve pressure over bony prominences  - Avoid friction and shearing  - Provide appropriate hygiene as needed including keeping skin clean and dry  - Evaluate need for skin moisturizer/barrier cream  - Collaborate with interdisciplinary team (i e  Nutrition, Rehabilitation, etc )   - Patient/family teaching  Outcome: Progressing     Problem: MUSCULOSKELETAL - ADULT  Goal: Maintain or return mobility to safest level of function  Description  INTERVENTIONS:  - Assess patient's ability to carry out ADLs; assess patient's baseline for ADL function and identify physical deficits which impact ability to perform ADLs (bathing, care of mouth/teeth, toileting, grooming, dressing, etc )  - Assess/evaluate cause of self-care deficits   - Assess range of motion  - Assess patient's mobility  - Assess patient's need for assistive devices and provide as appropriate  - Encourage maximum independence but intervene and supervise when necessary  - Involve family in performance of ADLs  - Assess for home care needs following discharge   - Consider OT consult to assist with ADL evaluation and planning for discharge  - Provide patient education as appropriate  Outcome: Progressing     Problem: PAIN - ADULT  Goal: Verbalizes/displays adequate comfort level or baseline comfort level  Description  Interventions:  - Encourage patient to monitor pain and request assistance  - Assess pain using appropriate pain scale  - Administer analgesics based on type and severity of pain and evaluate response  - Implement non-pharmacological measures as appropriate and evaluate response  - Consider cultural and social influences on pain and pain management  - Notify physician/advanced practitioner if interventions unsuccessful or patient reports new pain  Outcome: Progressing     Problem: INFECTION - ADULT  Goal: Absence or prevention of progression during hospitalization  Description  INTERVENTIONS:  - Assess and monitor for signs and symptoms of infection  - Monitor lab/diagnostic results  - Monitor all insertion sites, i e  indwelling lines, tubes, and drains  - Monitor endotracheal if appropriate and nasal secretions for changes in amount and color  - [place] appropriate cooling/warming therapies per order  - Administer medications as ordered  - Instruct and encourage patient and family to use good hand hygiene technique  - Identify and instruct in appropriate isolation precautions for identified infection/condition  Outcome: Progressing     Problem: DISCHARGE PLANNING  Goal: Discharge to home or other facility with appropriate resources  Description  INTERVENTIONS:  - Identify barriers to discharge w/patient and caregiver  - Arrange for needed discharge resources and transportation as appropriate  - Identify discharge learning needs (meds, wound care, etc )  - Arrange for interpretive services to assist at discharge as needed  - Refer to Case Management Department for coordinating discharge planning if the patient needs post-hospital services based on physician/advanced practitioner order or complex needs related to functional status, cognitive ability, or social support system  Outcome: Progressing     Problem: Knowledge Deficit  Goal: Patient/family/caregiver demonstrates understanding of disease process, treatment plan, medications, and discharge instructions  Description  Complete learning assessment and assess knowledge base  Interventions:  - Provide teaching at level of understanding  - Provide teaching via preferred learning methods  Outcome: Progressing     Problem: Nutrition/Hydration-ADULT  Goal: Nutrient/Hydration intake appropriate for improving, restoring or maintaining nutritional needs  Description  Monitor and assess patient's nutrition/hydration status for malnutrition  Collaborate with interdisciplinary team and initiate plan and interventions as ordered  Monitor patient's weight and dietary intake as ordered or per policy  Utilize nutrition screening tool and intervene as necessary  Determine patient's food preferences and provide high-protein, high-caloric foods as appropriate       INTERVENTIONS:  - Monitor oral intake, urinary output, labs, and treatment plans  - Assess nutrition and hydration status and recommend course of action  - Evaluate amount of meals eaten  - Assist patient with eating if necessary   - Allow adequate time for meals  - Recommend/ encourage appropriate diets, oral nutritional supplements, and vitamin/mineral supplements  - Order, calculate, and assess calorie counts as needed  - Recommend, monitor, and adjust tube feedings and TPN/PPN based on assessed needs  - Assess need for intravenous fluids  - Provide specific nutrition/hydration education as appropriate  - Include patient/family/caregiver in decisions related to nutrition  Outcome: Progressing

## 2020-02-09 NOTE — PROGRESS NOTES
Progress Note - Mariya Manning 60 y o  male MRN: [id number]    Unit/Bed#: [facility] 2 -02 Encounter: [id number]      Subjective: The patient feels fairly well  He thinks that his dyspnea on exertion has improved somewhat though it has not resolved  He denies chest pain  He slept pretty well  He has noticed significant increase in his urinary volume since receiving metolazone  He has no nausea or vomiting  His bowels are moving  Physical Exam:   Temp:  [97 5 °F (36 4 °C)-98 °F (36 7 °C)] 97 5 °F (36 4 °C)  HR:  [73-85] 84  Resp:  [18-20] 18  BP: ()/(52-95) 132/80    Gen:  Well-developed, obese, in no distress  Neck:  Supple  No lymphadenopathy, goiter, bruit  Heart:  Regular rhythm  No murmur, gallop, or rub  Lungs:  Diminished breath sounds at the bases  No wheezing, rales, or rhonchi    Abd:  Soft with active bowel sounds  No mass, tenderness, or organomegaly  Extremities:  Somewhat less edema of right leg  Neuro:  Alert and oriented  No focal sign  Skin:  Warm and dry      LABS:   CMP:   Lab Results   Component Value Date    SODIUM 136 02/09/2020    K 3 6 02/09/2020    CL 97 (L) 02/09/2020    CO2 29 02/09/2020    BUN 22 02/09/2020    CREATININE 1 15 02/09/2020    CALCIUM 8 8 02/09/2020    EGFR 69 02/09/2020             Assessment/Plan:  1  Acute on chronic diastolic congestive heart failure  2  Coronary artery disease, status post coronary artery bypass grafting  3  Type 2 diabetes with diabetic neuropathy  4  Peripheral vascular disease, status post left BKA  5  Elephantiasis nostris  verricosa    The patient has been slow to diurese despite a significant dose of furosemide  He was given metolazone today and seems to be diuresing better  His potassium will be recheck tomorrow  He will be re-dosed with metolazone tomorrow and then reassessed for his further diuretic needs        VTE Pharmacologic Prophylaxis: Heparin  VTE Mechanical Prophylaxis: reason for no mechanical VTE prophylaxis CHF

## 2020-02-10 PROBLEM — I89.0: Status: ACTIVE | Noted: 2020-02-10

## 2020-02-10 LAB
ANION GAP SERPL CALCULATED.3IONS-SCNC: 8 MMOL/L (ref 4–13)
BASOPHILS # BLD AUTO: 0.08 THOUSANDS/ΜL (ref 0–0.1)
BASOPHILS NFR BLD AUTO: 1 % (ref 0–1)
BUN SERPL-MCNC: 30 MG/DL (ref 5–25)
CALCIUM SERPL-MCNC: 9.3 MG/DL (ref 8.3–10.1)
CHLORIDE SERPL-SCNC: 92 MMOL/L (ref 100–108)
CO2 SERPL-SCNC: 31 MMOL/L (ref 21–32)
CREAT SERPL-MCNC: 1.39 MG/DL (ref 0.6–1.3)
EOSINOPHIL # BLD AUTO: 0.2 THOUSAND/ΜL (ref 0–0.61)
EOSINOPHIL NFR BLD AUTO: 2 % (ref 0–6)
ERYTHROCYTE [DISTWIDTH] IN BLOOD BY AUTOMATED COUNT: 14 % (ref 11.6–15.1)
GFR SERPL CREATININE-BSD FRML MDRD: 55 ML/MIN/1.73SQ M
GLUCOSE SERPL-MCNC: 216 MG/DL (ref 65–140)
GLUCOSE SERPL-MCNC: 255 MG/DL (ref 65–140)
GLUCOSE SERPL-MCNC: 304 MG/DL (ref 65–140)
GLUCOSE SERPL-MCNC: 312 MG/DL (ref 65–140)
GLUCOSE SERPL-MCNC: 318 MG/DL (ref 65–140)
HCT VFR BLD AUTO: 41.8 % (ref 36.5–49.3)
HGB BLD-MCNC: 13.3 G/DL (ref 12–17)
IMM GRANULOCYTES # BLD AUTO: 0.03 THOUSAND/UL (ref 0–0.2)
IMM GRANULOCYTES NFR BLD AUTO: 0 % (ref 0–2)
LYMPHOCYTES # BLD AUTO: 1.6 THOUSANDS/ΜL (ref 0.6–4.47)
LYMPHOCYTES NFR BLD AUTO: 18 % (ref 14–44)
MCH RBC QN AUTO: 27.2 PG (ref 26.8–34.3)
MCHC RBC AUTO-ENTMCNC: 31.8 G/DL (ref 31.4–37.4)
MCV RBC AUTO: 86 FL (ref 82–98)
MONOCYTES # BLD AUTO: 0.67 THOUSAND/ΜL (ref 0.17–1.22)
MONOCYTES NFR BLD AUTO: 8 % (ref 4–12)
NEUTROPHILS # BLD AUTO: 6.33 THOUSANDS/ΜL (ref 1.85–7.62)
NEUTS SEG NFR BLD AUTO: 71 % (ref 43–75)
NRBC BLD AUTO-RTO: 0 /100 WBCS
PLATELET # BLD AUTO: 270 THOUSANDS/UL (ref 149–390)
PMV BLD AUTO: 9.7 FL (ref 8.9–12.7)
POTASSIUM SERPL-SCNC: 4.7 MMOL/L (ref 3.5–5.3)
RBC # BLD AUTO: 4.89 MILLION/UL (ref 3.88–5.62)
SODIUM SERPL-SCNC: 131 MMOL/L (ref 136–145)
WBC # BLD AUTO: 8.91 THOUSAND/UL (ref 4.31–10.16)

## 2020-02-10 PROCEDURE — 97116 GAIT TRAINING THERAPY: CPT

## 2020-02-10 PROCEDURE — 97110 THERAPEUTIC EXERCISES: CPT

## 2020-02-10 PROCEDURE — 97535 SELF CARE MNGMENT TRAINING: CPT

## 2020-02-10 PROCEDURE — 82948 REAGENT STRIP/BLOOD GLUCOSE: CPT

## 2020-02-10 PROCEDURE — 85025 COMPLETE CBC W/AUTO DIFF WBC: CPT | Performed by: INTERNAL MEDICINE

## 2020-02-10 PROCEDURE — 99232 SBSQ HOSP IP/OBS MODERATE 35: CPT | Performed by: INTERNAL MEDICINE

## 2020-02-10 PROCEDURE — 97530 THERAPEUTIC ACTIVITIES: CPT

## 2020-02-10 PROCEDURE — 80048 BASIC METABOLIC PNL TOTAL CA: CPT | Performed by: INTERNAL MEDICINE

## 2020-02-10 RX ADMIN — TAMSULOSIN HYDROCHLORIDE 0.4 MG: 0.4 CAPSULE ORAL at 17:30

## 2020-02-10 RX ADMIN — METOCLOPRAMIDE HYDROCHLORIDE 5 MG: 10 TABLET ORAL at 09:49

## 2020-02-10 RX ADMIN — METOLAZONE 5 MG: 5 TABLET ORAL at 09:47

## 2020-02-10 RX ADMIN — CLOPIDOGREL 75 MG: 75 TABLET, FILM COATED ORAL at 10:00

## 2020-02-10 RX ADMIN — INSULIN GLARGINE 44 UNITS: 100 INJECTION, SOLUTION SUBCUTANEOUS at 09:51

## 2020-02-10 RX ADMIN — HEPARIN SODIUM 5000 UNITS: 5000 INJECTION INTRAVENOUS; SUBCUTANEOUS at 21:23

## 2020-02-10 RX ADMIN — HEPARIN SODIUM 5000 UNITS: 5000 INJECTION INTRAVENOUS; SUBCUTANEOUS at 14:59

## 2020-02-10 RX ADMIN — ATORVASTATIN CALCIUM 80 MG: 80 TABLET, FILM COATED ORAL at 17:30

## 2020-02-10 RX ADMIN — INSULIN LISPRO 3 UNITS: 100 INJECTION, SOLUTION INTRAVENOUS; SUBCUTANEOUS at 17:51

## 2020-02-10 RX ADMIN — INSULIN LISPRO 3 UNITS: 100 INJECTION, SOLUTION INTRAVENOUS; SUBCUTANEOUS at 12:49

## 2020-02-10 RX ADMIN — INSULIN GLARGINE 44 UNITS: 100 INJECTION, SOLUTION SUBCUTANEOUS at 17:50

## 2020-02-10 RX ADMIN — INSULIN LISPRO 3 UNITS: 100 INJECTION, SOLUTION INTRAVENOUS; SUBCUTANEOUS at 21:24

## 2020-02-10 RX ADMIN — POTASSIUM CHLORIDE 20 MEQ: 1500 TABLET, EXTENDED RELEASE ORAL at 09:48

## 2020-02-10 RX ADMIN — INSULIN LISPRO 2 UNITS: 100 INJECTION, SOLUTION INTRAVENOUS; SUBCUTANEOUS at 09:51

## 2020-02-10 RX ADMIN — FUROSEMIDE 80 MG: 10 INJECTION, SOLUTION INTRAMUSCULAR; INTRAVENOUS at 09:00

## 2020-02-10 RX ADMIN — TRIAMCINOLONE ACETONIDE: 1 CREAM TOPICAL at 09:55

## 2020-02-10 RX ADMIN — TRIAMCINOLONE ACETONIDE: 1 CREAM TOPICAL at 17:50

## 2020-02-10 RX ADMIN — PANTOPRAZOLE SODIUM 40 MG: 40 TABLET, DELAYED RELEASE ORAL at 10:05

## 2020-02-10 RX ADMIN — FINASTERIDE 5 MG: 5 TABLET, FILM COATED ORAL at 09:47

## 2020-02-10 RX ADMIN — HEPARIN SODIUM 5000 UNITS: 5000 INJECTION INTRAVENOUS; SUBCUTANEOUS at 05:48

## 2020-02-10 NOTE — PLAN OF CARE
Problem: OCCUPATIONAL THERAPY ADULT  Goal: Performs self-care activities at highest level of function for planned discharge setting  See evaluation for individualized goals  Description  Treatment Interventions: Functional transfer training, ADL retraining, UE strengthening/ROM, Endurance training, Cognitive reorientation, Patient/family training, Equipment evaluation/education, Compensatory technique education, Activityengagement, Energy conservation          See flowsheet documentation for full assessment, interventions and recommendations  Outcome: Progressing  Note:   Limitation: Decreased ADL status, Decreased Safe judgement during ADL, Decreased UE strength, Decreased cognition, Decreased endurance, Decreased self-care trans, Decreased high-level ADLs, Decreased sensation, Decreased fine motor control  Prognosis: Good  Assessment: Pt was seen for skilled OT with focus on completion of self care tasks, functional transfers, review of RW safety  See above levels of A required for all functional tasks  Pt with some self limiting behaviors noted warranting need for moderate encouragement to engage in tx session  Pt may benefit from further rehab with focus on achieving optimal performance levels with all functional tasks   Continue to recommend Inpatient rehab     OT Discharge Recommendation: Short Term Rehab  OT - OK to Discharge: No(when medically cleared  )

## 2020-02-10 NOTE — SOCIAL WORK
CM received a call from [address] [place] [address] liaison inquiring if pt would be cleared for d/c today/tomorrow  TT attending and she reported that pt would likely be ready for tomorrow  Updated liaison via [facility]; she will submit for auth for a anticipated d/c tomorrow  Made pt aware and transport would be provided by wife      Job Plan, LOU  2/10/2020  4455

## 2020-02-10 NOTE — PROGRESS NOTES
Progress Note - Kobe Basilio 1960, 60 y o  male MRN: [id number]    Unit/Bed#: [facility] [address] 2 [address] 218-02 Encounter: [id number]    Primary Care Provider: Iman Francois DO   Date and time admitted to hospital: 2/4/2020  5:18 PM        * Acute on chronic diastolic heart failure [facility])  Assessment & Plan  Wt Readings from Last 3 Encounters:   02/10/20 124 kg (272 lb 11 3 oz)   02/04/20 123 kg (272 lb)   10/23/19 119 kg (262 lb)     Cardiology follow-up appreciated, diuretics will be held today given acute kidney injury  Restart furosemide 40 mg p o  B i d   And metolazone 5 mg twice weekly tomorrow  Monitor I and O, fluid restriction          Elephantiasis nostras verrucosa  Assessment & Plan  Of right lower extremity  Dermatology consultation and input appreciated  Recommended elevation try wound care/lymphedema clinic  Triamcinolone    CAD (coronary artery disease)  Assessment & Plan  S/p cabg  No cp  Continue plavix statin    Amputated below knee, left (HCC)  Assessment & Plan  W/left knee amputation    Class 2 severe obesity due to excess calories with serious comorbidity and body mass index (BMI) of 35 0 to 35 9 in adult [facility])  Assessment & Plan  Encourage weight loss    PAD (peripheral artery disease) ([facility] [address] )  Assessment & Plan  S/p BKA to left leg   -Recent LEADS w/50-75% stenosis in the proximal popliteal artery, and diffuse  atherosclerotic arterial disease throughout the remaining portions of the  femoropopliteal vessels  continue statin/plavix      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 [facility])  Assessment & Plan  Lab Results   Component Value Date    HGBA1C 8 5 (H) 02/04/2020       Recent Labs     02/09/20  1618 02/09/20  2107 02/10/20  0753 02/10/20  1135   POCGLU 276* 322* 216* 318*     Continue Lantus 44 units b i d   Monitor Accu-Cheks, sliding scale for coverage    Hyperlipidemia  Assessment & Plan  Continue statin        VTE Pharmacologic Prophylaxis: Pharmacologic: heparin    Patient Centered Rounds: I have performed bedside rounds with nursing staff today  Time Spent for Care: 20 minutes  More than 50% of total time spent on counseling and coordination of care as described above  Current Length of Stay: 5 day(s)    Current Patient Status: Inpatient   Certification Statement: The patient will continue to require additional inpatient hospital stay due to Acute kidney injury    Discharge Plan / Estimated Discharge Date:  24-48 hours    Code Status: Level 1 - Full Code      Subjective:   Patient seen and examined at bedside, currently denies any complaints    Objective:     Vitals:   Temp (24hrs), Av 9 °F (36 6 °C), Min:97 8 °F (36 6 °C), Max:97 9 °F (36 6 °C)    Temp:  [97 8 °F (36 6 °C)-97 9 °F (36 6 °C)] 97 8 °F (36 6 °C)  HR:  [71-79] 73  Resp:  [18-20] 18  BP: (104-125)/(54-68) 125/68  SpO2:  [94 %-96 %] 96 %  Body mass index is 35 01 kg/m²  Input and Output Summary (last 24 hours): Intake/Output Summary (Last 24 hours) at 2/10/2020 1512  Last data filed at 2/10/2020 1101  Gross per 24 hour   Intake 250 ml   Output 2900 ml   Net -2650 ml       Physical Exam:    Constitutional: Patient is oriented to person, place and time, no acute distress  HEENT:  Normocephalic, atraumatic  Cardiovascular: Normal S1S2, RRR, No murmurs/rubs/gallops appreciated  Pulmonary:  Bilateral air entry, No rhonchi/rales/wheezing appreciated  Abdominal: Soft, Bowel sounds present, Non-tender, Non-distended  Extremities:  Left BKA, RLE with edema and dusky cobblestone plague of mid aspect of right shin  Neurological: Cranial nerves II-XII grossly intact, sensation intact, otherwise no focal neurological symptoms       Additional Data:     Labs:    Results from last 7 days   Lab Units 02/10/20  0942   WBC Thousand/uL 8 91   HEMOGLOBIN g/dL 13 3   HEMATOCRIT % 41 8   PLATELETS Thousands/uL 270   NEUTROS PCT % 71   LYMPHS PCT % 18   MONOS PCT % 8   EOS PCT % 2 Results from last 7 days   Lab Units 02/10/20  0942  02/04/20  1732   POTASSIUM mmol/L 4 7   < > 4 1   CHLORIDE mmol/L 92*   < > 102   CO2 mmol/L 31   < > 27   BUN mg/dL 30*   < > 16   CREATININE mg/dL 1 39*   < > 1 21   CALCIUM mg/dL 9 3   < > 8 8   ALK PHOS U/L  --   --  58   ALT U/L  --   --  33   AST U/L  --   --  25    < > = values in this interval not displayed  Results from last 7 days   Lab Units 02/04/20  1732   INR  1 01        I Have Reviewed All Lab Data Listed Above          Recent Cultures (last 7 days):           Last 24 Hours Medication List:     Current Facility-Administered Medications:  atorvastatin 80 mg Oral Daily With Olivier Chen NUNO Boles   clopidogrel 75 mg Oral Daily Jennifer Boles PA-C   finasteride 5 mg Oral Daily Jennifer Boles PA-C   heparin (porcine) 5,000 Units Subcutaneous Q8H [address] Jennifer Boles PA-C   insulin glargine 44 Units Subcutaneous BID Austin Lerner MD   insulin lispro 1-5 Units Subcutaneous 4x Daily (AC & HS) Jennifer Boles PA-C   metoclopramide 5 mg Oral Daily Jennifer Boles PA-C   ondansetron 4 mg Intravenous Q6H PRN Jennifer Boles PA-C   pantoprazole 40 mg Oral Early Morning Jennifer Boles PA-C   potassium chloride 20 mEq Oral Daily Rumiguel angel Merchant DO   tamsulosin 0 4 mg Oral Daily With Patti Boles PA-C   triamcinolone  Topical BID Rohit Burton DO        Today, Patient Was Seen By: Gerald Moise MD

## 2020-02-10 NOTE — OCCUPATIONAL THERAPY NOTE
Occupational Therapy Treatment Note:         02/10/20 1139   Restrictions/Precautions   Weight Bearing Precautions Per Order No   Braces or Orthoses   (prosthesis)   Other Precautions Fall Risk;Pain   Pain Assessment   Pain Assessment No/denies pain   Pain Score No Pain   ADL   Where Assessed Chair   Grooming Assistance 4  Minimal Assistance   Grooming Deficit Setup;Steadying;Verbal cueing;Supervision/safety; Increased time to complete;Standing with assistive device   Grooming Comments to stand at sink for hand hygiene  UB Bathing Assistance 5  Supervision/Setup   UB Bathing Deficit Setup   LB Bathing Assistance 3  Moderate Assistance   LB Bathing Deficit Setup;Steadying;Verbal cueing; Increased time to complete;Supervision/safety; Perineal area; Buttocks;Right lower leg including foot; Left lower leg including foot   LB Bathing Comments Pt with limtied functional reach with simulation  UB Dressing Assistance 5  Supervision/Setup   UB Dressing Deficit Setup   LB Dressing Assistance 3  Moderate Assistance   LB Dressing Deficit Steadying;Setup; Requires assistive device for steadying;Verbal cueing;Supervision/safety; Increased time to complete; Don/doff R sock; Don/doff L sock; Thread LLE into pants; Thread RLE into pants; Thread RLE into underwear; Thread LLE into underwear;Pull up over hips;Don/doff R shoe   LB Dressing Comments Pt with limtied functional reach noted  Pt stated, I have the equipment it doesnt work"  Toileting Assistance  4  Minimal Assistance   Toileting Deficit Setup;Steadying; Increased time to complete;Verbal cueing;Supervison/safety; Clothing management up;Clothing management down;Perineal hygiene;Grab bar use   Toileting Comments Pt with need for hands on A with clothing management due to noted dizziness  Functional Standing Tolerance   Time 5 mins   Activity dynamic stand balance activity  Comments Pt with limited stand tolerance due to noted dizziness/fatigue      Bed Mobility   Supine to Sit Unable to assess   Transfers   Sit to Stand 4  Minimal assistance   Additional items Assist x 1; Armrests; Increased time required;Verbal cues   Stand to Sit 4  Minimal assistance   Additional items Assist x 1; Armrests; Increased time required;Verbal cues   Stand pivot 4  Minimal assistance   Additional items Assist x 1; Armrests; Increased time required;Verbal cues   Toilet transfer 4  Minimal assistance   Additional items Assist x 1; Armrests; Increased time required;Verbal cues;Standard toilet   Additional Comments cues for safety required with activityi  Functional Mobility   Functional Mobility 4  Minimal assistance   Additional Comments x1   Additional items Rolling walker   Cognition   Overall Cognitive Status WFL   Arousal/Participation Alert; Responsive; Cooperative   Attention Attends with cues to redirect   Orientation Level Oriented X4   Memory Decreased recall of precautions   Following Commands Follows one step commands without difficulty   Comments Pt with inconsistent carry over of reviewed techs for RW safety  Additional Activities   Additional Activities Other (Comment)  (reviewed fall prevention/current plan of care  )   Additional Activities Comments Pt with many comments as to why he was unabelt o complete each task presented  Moderate reassurance and encouragement required for Pt to focus on positive techs and problem solving  Activity Tolerance   Activity Tolerance Patient limited by fatigue   Medical Staff Made Aware Reproted all findings to nursing staff  Assessment   Assessment Pt was seen for skilled OT with focus on completion of self care tasks, functional transfers, review of RW safety  See above levels of A required for all functional tasks  Pt with some self limiting behaviors noted warranting need for moderate encouragement to engage in tx session  Pt may benefit from further rehab with focus on achieving optimal performance levels with all functional tasks   Continue to recommend Inpatient rehab   Plan   Treatment Interventions ADL retraining;Functional transfer training; Endurance training;Cognitive reorientation;Patient/family training   Goal Expiration Date 02/19/20   OT Treatment Day 2   OT Frequency 3-5x/wk   Recommendation   OT Discharge Recommendation Short Term Rehab   Barthel Index   Feeding 10   Bathing 0   Grooming Score 5   Dressing Score 5   Bladder Score 10   Bowels Score 10   Toilet Use Score 5   Transfers (Bed/Chair) Score 5   Mobility (Level Surface) Score 0   Stairs Score 0   Barthel Index Score 50   Modified [place]wood Scale   Modified [place] Scale 4   [place], [address]

## 2020-02-10 NOTE — ASSESSMENT & PLAN NOTE
Lab Results   Component Value Date    HGBA1C 8 5 (H) 02/04/2020       Recent Labs     02/09/20  1618 02/09/20  2107 02/10/20  0753 02/10/20  1135   POCGLU 276* 322* 216* 318*     Continue Lantus 44 units b i d   Monitor Accu-Cheks, sliding scale for coverage

## 2020-02-10 NOTE — PLAN OF CARE
Problem: Potential for Falls  Goal: Patient will remain free of falls  Description  INTERVENTIONS:  - Assess patient frequently for physical needs  -  Identify cognitive and physical deficits and behaviors that affect risk of falls    -  [place] fall precautions as indicated by assessment   - Educate patient/family on patient safety including physical limitations  - Instruct patient to call for assistance with activity based on assessment  - Modify environment to reduce risk of injury  - Consider OT/PT consult to assist with strengthening/mobility  Outcome: Progressing     Problem: Prexisting or High Potential for Compromised Skin Integrity  Goal: Skin integrity is maintained or improved  Description  INTERVENTIONS:  - Identify patients at risk for skin breakdown  - Assess and monitor skin integrity  - Assess and monitor nutrition and hydration status  - Monitor labs   - Assess for incontinence   - Turn and reposition patient  - Assist with mobility/ambulation  - Relieve pressure over bony prominences  - Avoid friction and shearing  - Provide appropriate hygiene as needed including keeping skin clean and dry  - Evaluate need for skin moisturizer/barrier cream  - Collaborate with interdisciplinary team   - Patient/family teaching  - Consider wound care consult   Outcome: Progressing     Problem: CARDIOVASCULAR - ADULT  Goal: Maintains optimal cardiac output and hemodynamic stability  Description  INTERVENTIONS:  - Monitor I/O, vital signs and rhythm  - Monitor for S/S and trends of decreased cardiac output  - Administer and titrate ordered vasoactive medications to optimize hemodynamic stability  - Assess quality of pulses, skin color and temperature  - Assess for signs of decreased coronary artery perfusion  - Instruct patient to report change in severity of symptoms  Outcome: Progressing  Goal: Absence of cardiac dysrhythmias or at baseline rhythm  Description  INTERVENTIONS:  - Continuous cardiac monitoring, vital signs, obtain 12 lead EKG if ordered  - Administer antiarrhythmic and heart rate control medications as ordered  - Monitor electrolytes and administer replacement therapy as ordered  Outcome: Progressing     Problem: RESPIRATORY - ADULT  Goal: Achieves optimal ventilation and oxygenation  Description  INTERVENTIONS:  - Assess for changes in respiratory status  - Assess for changes in mentation and behavior  - Position to facilitate oxygenation and minimize respiratory effort  - Oxygen administered by appropriate delivery if ordered  - Initiate smoking cessation education as indicated  - Encourage broncho-pulmonary hygiene including cough, deep breathe, Incentive Spirometry  - Assess the need for suctioning and aspirate as needed  - Assess and instruct to report SOB or any respiratory difficulty  - Respiratory Therapy support as indicated  Outcome: Progressing     Problem: SKIN/TISSUE INTEGRITY - ADULT  Goal: Skin integrity remains intact  Description  INTERVENTIONS  - Identify patients at risk for skin breakdown  - Assess and monitor skin integrity  - Assess and monitor nutrition and hydration status  - Monitor labs (i e  albumin)  - Assess for incontinence   - Turn and reposition patient  - Assist with mobility/ambulation  - Relieve pressure over bony prominences  - Avoid friction and shearing  - Provide appropriate hygiene as needed including keeping skin clean and dry  - Evaluate need for skin moisturizer/barrier cream  - Collaborate with interdisciplinary team (i e  Nutrition, Rehabilitation, etc )   - Patient/family teaching  Outcome: Progressing     Problem: MUSCULOSKELETAL - ADULT  Goal: Maintain or return mobility to safest level of function  Description  INTERVENTIONS:  - Assess patient's ability to carry out ADLs; assess patient's baseline for ADL function and identify physical deficits which impact ability to perform ADLs (bathing, care of mouth/teeth, toileting, grooming, dressing, etc )  - Assess/evaluate cause of self-care deficits   - Assess range of motion  - Assess patient's mobility  - Assess patient's need for assistive devices and provide as appropriate  - Encourage maximum independence but intervene and supervise when necessary  - Involve family in performance of ADLs  - Assess for home care needs following discharge   - Consider OT consult to assist with ADL evaluation and planning for discharge  - Provide patient education as appropriate  Outcome: Progressing     Problem: PAIN - ADULT  Goal: Verbalizes/displays adequate comfort level or baseline comfort level  Description  Interventions:  - Encourage patient to monitor pain and request assistance  - Assess pain using appropriate pain scale  - Administer analgesics based on type and severity of pain and evaluate response  - Implement non-pharmacological measures as appropriate and evaluate response  - Consider cultural and social influences on pain and pain management  - Notify physician/advanced practitioner if interventions unsuccessful or patient reports new pain  Outcome: Progressing     Problem: INFECTION - ADULT  Goal: Absence or prevention of progression during hospitalization  Description  INTERVENTIONS:  - Assess and monitor for signs and symptoms of infection  - Monitor lab/diagnostic results  - Monitor all insertion sites, i e  indwelling lines, tubes, and drains  - Monitor endotracheal if appropriate and nasal secretions for changes in amount and color  - [place] appropriate cooling/warming therapies per order  - Administer medications as ordered  - Instruct and encourage patient and family to use good hand hygiene technique  - Identify and instruct in appropriate isolation precautions for identified infection/condition  Outcome: Progressing     Problem: DISCHARGE PLANNING  Goal: Discharge to home or other facility with appropriate resources  Description  INTERVENTIONS:  - Identify barriers to discharge w/patient and caregiver  - Arrange for needed discharge resources and transportation as appropriate  - Identify discharge learning needs (meds, wound care, etc )  - Arrange for interpretive services to assist at discharge as needed  - Refer to Case Management Department for coordinating discharge planning if the patient needs post-hospital services based on physician/advanced practitioner order or complex needs related to functional status, cognitive ability, or social support system  Outcome: Progressing     Problem: Knowledge Deficit  Goal: Patient/family/caregiver demonstrates understanding of disease process, treatment plan, medications, and discharge instructions  Description  Complete learning assessment and assess knowledge base  Interventions:  - Provide teaching at level of understanding  - Provide teaching via preferred learning methods  Outcome: Progressing     Problem: Nutrition/Hydration-ADULT  Goal: Nutrient/Hydration intake appropriate for improving, restoring or maintaining nutritional needs  Description  Monitor and assess patient's nutrition/hydration status for malnutrition  Collaborate with interdisciplinary team and initiate plan and interventions as ordered  Monitor patient's weight and dietary intake as ordered or per policy  Utilize nutrition screening tool and intervene as necessary  Determine patient's food preferences and provide high-protein, high-caloric foods as appropriate       INTERVENTIONS:  - Monitor oral intake, urinary output, labs, and treatment plans  - Assess nutrition and hydration status and recommend course of action  - Evaluate amount of meals eaten  - Assist patient with eating if necessary   - Allow adequate time for meals  - Recommend/ encourage appropriate diets, oral nutritional supplements, and vitamin/mineral supplements  - Order, calculate, and assess calorie counts as needed  - Recommend, monitor, and adjust tube feedings and TPN/PPN based on assessed needs  - Assess need for intravenous fluids  - Provide specific nutrition/hydration education as appropriate  - Include patient/family/caregiver in decisions related to nutrition  Outcome: Progressing

## 2020-02-10 NOTE — PLAN OF CARE
Problem: Potential for Falls  Goal: Patient will remain free of falls  Description  INTERVENTIONS:  - Assess patient frequently for physical needs  -  Identify cognitive and physical deficits and behaviors that affect risk of falls    -  [place] fall precautions as indicated by assessment   - Educate patient/family on patient safety including physical limitations  - Instruct patient to call for assistance with activity based on assessment  - Modify environment to reduce risk of injury  - Consider OT/PT consult to assist with strengthening/mobility  Outcome: Progressing     Problem: Prexisting or High Potential for Compromised Skin Integrity  Goal: Skin integrity is maintained or improved  Description  INTERVENTIONS:  - Identify patients at risk for skin breakdown  - Assess and monitor skin integrity  - Assess and monitor nutrition and hydration status  - Monitor labs   - Assess for incontinence   - Turn and reposition patient  - Assist with mobility/ambulation  - Relieve pressure over bony prominences  - Avoid friction and shearing  - Provide appropriate hygiene as needed including keeping skin clean and dry  - Evaluate need for skin moisturizer/barrier cream  - Collaborate with interdisciplinary team   - Patient/family teaching  - Consider wound care consult   Outcome: Progressing     Problem: CARDIOVASCULAR - ADULT  Goal: Maintains optimal cardiac output and hemodynamic stability  Description  INTERVENTIONS:  - Monitor I/O, vital signs and rhythm  - Monitor for S/S and trends of decreased cardiac output  - Administer and titrate ordered vasoactive medications to optimize hemodynamic stability  - Assess quality of pulses, skin color and temperature  - Assess for signs of decreased coronary artery perfusion  - Instruct patient to report change in severity of symptoms  Outcome: Progressing  Goal: Absence of cardiac dysrhythmias or at baseline rhythm  Description  INTERVENTIONS:  - Continuous cardiac monitoring, vital signs, obtain 12 lead EKG if ordered  - Administer antiarrhythmic and heart rate control medications as ordered  - Monitor electrolytes and administer replacement therapy as ordered  Outcome: Progressing     Problem: RESPIRATORY - ADULT  Goal: Achieves optimal ventilation and oxygenation  Description  INTERVENTIONS:  - Assess for changes in respiratory status  - Assess for changes in mentation and behavior  - Position to facilitate oxygenation and minimize respiratory effort  - Oxygen administered by appropriate delivery if ordered  - Initiate smoking cessation education as indicated  - Encourage broncho-pulmonary hygiene including cough, deep breathe, Incentive Spirometry  - Assess the need for suctioning and aspirate as needed  - Assess and instruct to report SOB or any respiratory difficulty  - Respiratory Therapy support as indicated  Outcome: Progressing     Problem: SKIN/TISSUE INTEGRITY - ADULT  Goal: Skin integrity remains intact  Description  INTERVENTIONS  - Identify patients at risk for skin breakdown  - Assess and monitor skin integrity  - Assess and monitor nutrition and hydration status  - Monitor labs (i e  albumin)  - Assess for incontinence   - Turn and reposition patient  - Assist with mobility/ambulation  - Relieve pressure over bony prominences  - Avoid friction and shearing  - Provide appropriate hygiene as needed including keeping skin clean and dry  - Evaluate need for skin moisturizer/barrier cream  - Collaborate with interdisciplinary team (i e  Nutrition, Rehabilitation, etc )   - Patient/family teaching  Outcome: Progressing     Problem: MUSCULOSKELETAL - ADULT  Goal: Maintain or return mobility to safest level of function  Description  INTERVENTIONS:  - Assess patient's ability to carry out ADLs; assess patient's baseline for ADL function and identify physical deficits which impact ability to perform ADLs (bathing, care of mouth/teeth, toileting, grooming, dressing, etc )  - Assess/evaluate cause of self-care deficits   - Assess range of motion  - Assess patient's mobility  - Assess patient's need for assistive devices and provide as appropriate  - Encourage maximum independence but intervene and supervise when necessary  - Involve family in performance of ADLs  - Assess for home care needs following discharge   - Consider OT consult to assist with ADL evaluation and planning for discharge  - Provide patient education as appropriate  Outcome: Progressing     Problem: PAIN - ADULT  Goal: Verbalizes/displays adequate comfort level or baseline comfort level  Description  Interventions:  - Encourage patient to monitor pain and request assistance  - Assess pain using appropriate pain scale  - Administer analgesics based on type and severity of pain and evaluate response  - Implement non-pharmacological measures as appropriate and evaluate response  - Consider cultural and social influences on pain and pain management  - Notify physician/advanced practitioner if interventions unsuccessful or patient reports new pain  Outcome: Progressing     Problem: INFECTION - ADULT  Goal: Absence or prevention of progression during hospitalization  Description  INTERVENTIONS:  - Assess and monitor for signs and symptoms of infection  - Monitor lab/diagnostic results  - Monitor all insertion sites, i e  indwelling lines, tubes, and drains  - Monitor endotracheal if appropriate and nasal secretions for changes in amount and color  - Viper appropriate cooling/warming therapies per order  - Administer medications as ordered  - Instruct and encourage patient and family to use good hand hygiene technique  - Identify and instruct in appropriate isolation precautions for identified infection/condition  Outcome: Progressing     Problem: DISCHARGE PLANNING  Goal: Discharge to home or other facility with appropriate resources  Description  INTERVENTIONS:  - Identify barriers to discharge w/patient and caregiver  - Arrange for needed discharge resources and transportation as appropriate  - Identify discharge learning needs (meds, wound care, etc )  - Arrange for interpretive services to assist at discharge as needed  - Refer to Case Management Department for coordinating discharge planning if the patient needs post-hospital services based on physician/advanced practitioner order or complex needs related to functional status, cognitive ability, or social support system  Outcome: Progressing     Problem: Knowledge Deficit  Goal: Patient/family/caregiver demonstrates understanding of disease process, treatment plan, medications, and discharge instructions  Description  Complete learning assessment and assess knowledge base  Interventions:  - Provide teaching at level of understanding  - Provide teaching via preferred learning methods  Outcome: Progressing     Problem: Nutrition/Hydration-ADULT  Goal: Nutrient/Hydration intake appropriate for improving, restoring or maintaining nutritional needs  Description  Monitor and assess patient's nutrition/hydration status for malnutrition  Collaborate with interdisciplinary team and initiate plan and interventions as ordered  Monitor patient's weight and dietary intake as ordered or per policy  Utilize nutrition screening tool and intervene as necessary  Determine patient's food preferences and provide high-protein, high-caloric foods as appropriate       INTERVENTIONS:  - Monitor oral intake, urinary output, labs, and treatment plans  - Assess nutrition and hydration status and recommend course of action  - Evaluate amount of meals eaten  - Assist patient with eating if necessary   - Allow adequate time for meals  - Recommend/ encourage appropriate diets, oral nutritional supplements, and vitamin/mineral supplements  - Order, calculate, and assess calorie counts as needed  - Recommend, monitor, and adjust tube feedings and TPN/PPN based on assessed needs  - Assess need for intravenous fluids  - Provide specific nutrition/hydration education as appropriate  - Include patient/family/caregiver in decisions related to nutrition  Outcome: Progressing

## 2020-02-10 NOTE — PLAN OF CARE
Problem: PHYSICAL THERAPY ADULT  Goal: Performs mobility at highest level of function for planned discharge setting  See evaluation for individualized goals  Description  Treatment/Interventions: Functional transfer training, LE strengthening/ROM, Elevations, Therapeutic exercise, Endurance training, Patient/family training, Equipment eval/education, Bed mobility, Gait training, Spoke to nursing, OT  Equipment Recommended: Leland Florez, Wheelchair       See flowsheet documentation for full assessment, interventions and recommendations  Outcome: Progressing  Note:   Prognosis: Fair  Problem List: Decreased strength, Decreased range of motion, Decreased endurance, Impaired balance, Decreased mobility, Obesity, Decreased skin integrity, Orthopedic restrictions, Decreased safety awareness, Impaired sensation  Assessment: Pt  seated in bedside chair upon my arrival  Pt  continues to report imbalance with amb  due to RLE shoe not fitting appropriately due to increased edema  Thus, imbalance with LLE prosthesis application  Applied RLE surgical shoe for compensation with amb  trials at this time  Performance of HEP seated in bedside chair with cues provided for proper completion  Progressed with transfers being able to complete with Joan of therapist with cues for hand placement/technique  Progressed with a limited amb  trial with use of RW and A of therapist with cues for LE sequencing  Pt  required a seated toileting break in between gait trials  Pt  returned to seated in bedside chair at end of treatment session  PT will continue to recommend d/c to rehab when medically stable for continued improvement of noted impairments above  Barriers to Discharge: Inaccessible home environment, Decreased caregiver support     Recommendation: Short-term skilled PT     PT - OK to Discharge: Yes(if d/c to rehab when medically stable )    See flowsheet documentation for full assessment

## 2020-02-10 NOTE — ASSESSMENT & PLAN NOTE
Wt Readings from Last 3 Encounters:   02/10/20 124 kg (272 lb 11 3 oz)   02/04/20 123 kg (272 lb)   10/23/19 119 kg (262 lb)     Cardiology follow-up appreciated, diuretics will be held today given acute kidney injury  Restart furosemide 40 mg p o  B i d   And metolazone 5 mg twice weekly tomorrow  Monitor I and O, fluid restriction

## 2020-02-10 NOTE — UTILIZATION REVIEW
Continued Stay Review    Date: 2/10/20                        Current Patient Class: IP Current Level of Care: [facility]    HPI:59 y o  male initially admitted on 2/5 to  IP  From OBS 2/4 due to  Progressive PATINO requiring further w/u to R/O myocardial ischemia    Assessment/Plan: Acute on chronic diastolic congestive heart failure  2/9: PATINO improved  but not resolved; Slow to diurese despite a significant dose of furosemide  Noticed significant increase in his urinary volume since receiving metolazone  Decreased edema RLE    2/10:  PATINO;  Lungs CTA  Per Cardio: Volume status improved but now with increasing B/Cr  hold diuretics today and recheck renal function tomorrow      DC'd IV Lasix and Metolazone     Pertinent Labs/Diagnostic Results:   Results from last 7 days   Lab Units 02/10/20  0942 02/04/20 2107 02/04/20  1732   WBC Thousand/uL 8 91  --  8 55   HEMOGLOBIN g/dL 13 3  --  13 5   HEMATOCRIT % 41 8  --  42 7   PLATELETS Thousands/uL 270 225 239   NEUTROS ABS Thousands/µL 6 33  --  5 91     Results from last 7 days   Lab Units 02/10/20  0942 02/09/20  0510 02/08/20  0516 02/05/20  0520 02/04/20  1732   SODIUM mmol/L 131* 136 136 139 139   POTASSIUM mmol/L 4 7 3 6 3 9 3 7 4 1   CHLORIDE mmol/L 92* 97* 97* 102 102   CO2 mmol/L 31 29 27 28 27   ANION GAP mmol/L 8 10 12 9 10   BUN mg/dL 30* 22 21 16 16   CREATININE mg/dL 1 39* 1 15 1 15 1 06 1 21   EGFR ml/min/1 73sq m 55 69 69 76 65   CALCIUM mg/dL 9 3 8 8 9 1 8 3 8 8     Results from last 7 days   Lab Units 02/04/20  1732   AST U/L 25   ALT U/L 33   ALK PHOS U/L 58   TOTAL PROTEIN g/dL 8 0   ALBUMIN g/dL 3 0*   TOTAL BILIRUBIN mg/dL 0 27     Results from last 7 days   Lab Units 02/10/20  1135 02/10/20  0753 02/09/20  2107 02/09/20  1618 02/09/20  1123 02/09/20  0724 02/08/20  2058 02/08/20  1615 02/08/20  1052 02/08/20  0717   POC GLUCOSE mg/dl 318* 216* 322* 276* 248* 158* 218* 224* 200* 159*     Results from last 7 days   Lab Units 02/10/20  3877 02/09/20  0510 02/08/20  0516 02/05/20  0520 02/04/20  1732   GLUCOSE RANDOM mg/dL 255* 184* 204* 139 159*     Results from last 7 days   Lab Units 02/04/20  1732   HEMOGLOBIN A1C % 8 5*   EAG mg/dl 197     Results from last 7 days   Lab Units 02/05/20  0028 02/04/20  2107 02/04/20  1732   TROPONIN I ng/mL <0 02 <0 02 <0 02     Results from last 7 days   Lab Units 02/04/20  1732   PROTIME seconds 13 4   INR  1 01   PTT seconds 26     Results from last 7 days   Lab Units 02/05/20  0520   TSH 3RD GENERATON uIU/mL 4 712*     Results from last 7 days   Lab Units 02/04/20  1732   NT-PRO BNP pg/mL 366*     Vital Signs:   02/10/20 09:43:17   73  125/68 96 %     02/10/20 07:46:43  97 8 °F (36 6 °C) 74 18 125/68 96 % None (Room air) Sitting   02/09/20 2304  97 9 °F (36 6 °C) 71 20 104/54 95 %         Medications:   Scheduled Medications:  Medications:  atorvastatin 80 mg Oral Daily With Dinner   clopidogrel 75 mg Oral Daily   finasteride 5 mg Oral Daily   heparin (porcine) 5,000 Units Subcutaneous Q8H [address]   insulin glargine 44 Units Subcutaneous BID   insulin lispro 1-5 Units Subcutaneous 4x Daily (AC & HS)   metoclopramide 5 mg Oral Daily   pantoprazole 40 mg Oral Early Morning   potassium chloride 20 mEq Oral Daily   tamsulosin 0 4 mg Oral Daily With Dinner   triamcinolone  Topical BID   Lasix 80 mg IV BID -    DC'd 2/10    Continuous IV Infusions:  PRN Meds:  ondansetron 4 mg Intravenous Q6H PRN     Discharge Plan: STR    Network Utilization Review Department  [email or web address]  ATTENTION: Please call with any questions or concerns to [phone] and carefully listen to the prompts so that you are directed to the right person  All voicemails are confidential   Eddie Martin all requests for admission clinical reviews, approved or denied determinations and any other requests to dedicated fax number below belonging to the campus where the patient is receiving treatment   List of dedicated fax numbers for the Facilities:  FACILITY NAME UR FAX NUMBER   ADMISSION DENIALS (Administrative/Medical Necessity) [address] [facility] (Maternity/NICU/Pediatrics) [phone]   [facility] [phone]   [facility] [phone]   Steve Farias [phone]   [facility] [phone]   Irma Lo [phone]   [facility]  [phone]   [address] [facility]  [address] [facility] [address] [facility] [phone]

## 2020-02-10 NOTE — ASSESSMENT & PLAN NOTE
Of right lower extremity  Dermatology consultation and input appreciated  Recommended elevation try wound care/lymphedema clinic  Triamcinolone

## 2020-02-10 NOTE — ASSESSMENT & PLAN NOTE
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

## 2020-02-10 NOTE — PROGRESS NOTES
Progress Note - Cardiology   Anaid Pretty 60 y o  male MRN: [id number]  Unit/Bed#: [place] [address] 218-02 Encounter: [id number]      Assessment/Recommendations/Discussion:   1  Acute on chronic diastolic heart failure, predominantly right-sided  2  CAD status post CABG x3, 03/2018 with LIMA to LAD, SVG to circumflex, SVG to RCA  3  Diabetes  4  Peripheral arterial disease, prior left BKA  5  Dyslipidemia  6  Orthostatic hypotension      · Volume status significantly improved, now with increased BUN and creatinine  Would hold diuretics today and recheck renal function tomorrow  Restart furosemide 40 mg p o  B i d   Add metolazone 5 mg to be taken twice weekly  Low-sodium diet has been strongly encouraged  Follow-up with Dr Janis Yuan after d/c  · Continue statin, clopidogrel  · Lipid panel at goal with LDl-C 59 mg/dL  · BP controlled  · Will sign off, please call if any questions        Subjective: Pt seen/examined    No CP, SOB          Physical Exam:  GEN:  NAD  HEENT:  MMM, NCAT, pink conjunctiva, EOMI, nonicteric sclera  CV:  NO JVD/HJR, RR, NO M/R/G, +S1/S2, NO PARASTERNAL HEAVE/THRILL, NO LE EDEMA, NO HEPATIC SYSTOLIC PULSATION, WARM EXTREMITIES  RESP:  CTAB/L  ABD:  SOFT, NT, NO GROSS ORGANOMEGALY        Vitals:   /68   Pulse 73   Temp 97 8 °F (36 6 °C) (Oral)   Resp 18   Wt 124 kg (272 lb 11 3 oz)   SpO2 96%   BMI 35 01 kg/m²   Vitals:    02/09/20 0620 02/10/20 0700   Weight: 125 kg (276 lb 3 8 oz) 124 kg (272 lb 11 3 oz)       Intake/Output Summary (Last 24 hours) at 2/10/2020 1200  Last data filed at 2/10/2020 1101  Gross per 24 hour   Intake 250 ml   Output 2900 ml   Net -2650 ml         Lab Results:  Results from last 7 days   Lab Units 02/10/20  0942   WBC Thousand/uL 8 91   HEMOGLOBIN g/dL 13 3   HEMATOCRIT % 41 8   PLATELETS Thousands/uL 270     Results from last 7 days   Lab Units 02/10/20  0942  02/04/20  1732   POTASSIUM mmol/L 4 7   < > 4 1   CHLORIDE mmol/L 92*   < > 102   CO2 mmol/L 31 < > 27   BUN mg/dL 30*   < > 16   CREATININE mg/dL 1 39*   < > 1 21   CALCIUM mg/dL 9 3   < > 8 8   ALK PHOS U/L  --   --  58   ALT U/L  --   --  33   AST U/L  --   --  25    < > = values in this interval not displayed       Results from last 7 days   Lab Units 02/10/20  0942   POTASSIUM mmol/L 4 7   CHLORIDE mmol/L 92*   CO2 mmol/L 31   BUN mg/dL 30*   CREATININE mg/dL 1 39*   CALCIUM mg/dL 9 3           Medications:    Current Facility-Administered Medications:     atorvastatin (LIPITOR) tablet 80 mg, 80 mg, Oral, Daily With Pascual Knutson PA-C, 80 mg at 02/09/20 1712    clopidogrel (PLAVIX) tablet 75 mg, 75 mg, Oral, Daily, Jennifer Boles PA-C, 75 mg at 02/10/20 1000    finasteride (PROSCAR) tablet 5 mg, 5 mg, Oral, Daily, Jennifer Boles PA-C, 5 mg at 02/10/20 0947    furosemide (LASIX) injection 80 mg, 80 mg, Intravenous, BID (diuretic), Laurita Orantes MD, 80 mg at 02/10/20 0900    heparin (porcine) subcutaneous injection 5,000 Units, 5,000 Units, Subcutaneous, Q8H [address], 5,000 Units at 02/10/20 0548 **AND** [COMPLETED] Platelet count, , , Once, Jennifer Boles PA-C    insulin glargine (LANTUS) subcutaneous injection 44 Units 0 44 mL, 44 Units, Subcutaneous, BID, Laurita Orantes MD, 44 Units at 02/10/20 0951    insulin lispro (HumaLOG) 100 units/mL subcutaneous injection 1-5 Units, 1-5 Units, Subcutaneous, 4x Daily (AC & HS), 2 Units at 02/10/20 0951 **AND** Fingerstick Glucose (POCT), , , 4x Daily AC and at bedtime, Jennifer Boles PA-C    metoclopramide (REGLAN) tablet 5 mg, 5 mg, Oral, Daily, Jennifer Boles PA-C, 5 mg at 02/10/20 0949    metolazone (ZAROXOLYN) tablet 5 mg, 5 mg, Oral, Daily, Laurita Orantes MD, 5 mg at 02/10/20 0947    ondansetron (ZOFRAN) injection 4 mg, 4 mg, Intravenous, Q6H PRN, Jennifer Boles PA-C    pantoprazole (PROTONIX) EC tablet 40 mg, 40 mg, Oral, Early Morning, Jennifer Boles PA-C, 40 mg at 02/10/20 1005    potassium chloride (K-DUR,KLOR-CON) CR tablet 20 mEq, 20 mEq, Oral, Daily, Rujul DO Mayur, 20 mEq at 02/10/20 0948    tamsulosin (FLOMAX) capsule 0 4 mg, 0 4 mg, Oral, Daily With Kwan Boles PA-C, 0 4 mg at 02/09/20 1712    triamcinolone (KENALOG) 0 1 % cream, , Topical, BID, Rohit Burton DO    This note was completed in part utilizing [employer] Flu[employer] Direct Software  Grammatical errors, random word insertions, spelling mistakes, and incomplete sentences may be an occasional consequence of this system secondary to software limitations, ambient noise, and hardware issues  If you have any questions or concerns about the content, text, or information contained within the body of this dictation, please contact the provider for clarification

## 2020-02-10 NOTE — PHYSICAL THERAPY NOTE
Physical Therapy Progress Note     02/10/20 1138   Pain Assessment   Pain Assessment No/denies pain   Pain Score No Pain   Restrictions/Precautions   Weight Bearing Precautions Per Order No   Braces or Orthoses Prosthesis; Other (Comment)  (LLE, surgical shoe RLE for compensation of LLE prosthesis )   Other Precautions Fall Risk;Multiple lines;Telemetry   General   Chart Reviewed Yes   Response to Previous Treatment Patient reporting fatigue but able to participate  Family/Caregiver Present No   Subjective   Subjective Willing to participate in therapy this AM    Transfers   Sit to Stand 4  Minimal assistance   Additional items Assist x 1; Armrests; Increased time required;Verbal cues   Stand to Sit 4  Minimal assistance   Additional items Assist x 1; Armrests; Increased time required;Verbal cues   Toilet transfer 4  Minimal assistance   Additional items Assist x 1; Armrests; Increased time required;Verbal cues; Commode   Ambulation/Elevation   Gait pattern Decreased foot clearance; Forward Flexion; Short stride; Step to;Excessively slow; Inconsistent gerard; Shuffling   Gait Assistance 4  Minimal assist   Additional items Assist x 1;Verbal cues; Tactile cues   Assistive Device Rolling walker   Distance 20' x 2 with a seated toileting break in between   Balance   Static Sitting Fair +   Dynamic Sitting Fair   Static Standing Fair -   Dynamic Standing Poor +   Ambulatory Poor +   Endurance Deficit   Endurance Deficit Yes   Endurance Deficit Description fatigue   Activity Tolerance   Activity Tolerance Patient limited by fatigue   Medical Staff [address] [facility],  [facility] Yes   Exercises   THR Sitting;10 reps;AAROM; Bilateral   Assessment   Prognosis Fair   Problem List Decreased strength;Decreased range of motion;Decreased endurance; Impaired balance;Decreased mobility;Obesity; Decreased skin integrity;Orthopedic restrictions;Decreased safety awareness; Impaired sensation   Assessment Pt  seated in bedside chair upon my arrival  Pt  continues to report imbalance with amb  due to RLE shoe not fitting appropriately due to increased edema  Thus, imbalance with LLE prosthesis application  Applied RLE surgical shoe for compensation with amb  trials at this time  Performance of HEP seated in bedside chair with cues provided for proper completion  Progressed with transfers being able to complete with Joan of therapist with cues for hand placement/technique  Progressed with a limited amb  trial with use of RW and A of therapist with cues for LE sequencing  Pt  required a seated toileting break in between gait trials  Pt  returned to seated in bedside chair at end of treatment session  PT will continue to recommend d/c to rehab when medically stable for continued improvement of noted impairments above  Barriers to Discharge Inaccessible home environment;Decreased caregiver support   Goals   Patient Goals To go to rehab  STG Expiration Date 02/19/20   PT Treatment Day 2   Plan   Treatment/Interventions Functional transfer training;LE strengthening/ROM; Therapeutic exercise; Endurance training;Gait training;Spoke to case management;Spoke to nursing;OT   Progress Slow progress, decreased activity tolerance   PT Frequency Other (Comment)  (3-5x/wk)   Recommendation   Recommendation Short-term skilled PT   Equipment Recommended Walker; Wheelchair  (RW)   PT - OK to Discharge Yes  (if d/c to rehab when medically stable )     Naga Baxter, PTA

## 2020-02-11 VITALS
RESPIRATION RATE: 20 BRPM | WEIGHT: 271.4 LBS | HEART RATE: 80 BPM | OXYGEN SATURATION: 92 % | SYSTOLIC BLOOD PRESSURE: 125 MMHG | BODY MASS INDEX: 34.85 KG/M2 | DIASTOLIC BLOOD PRESSURE: 76 MMHG | TEMPERATURE: 98.5 F

## 2020-02-11 LAB
ANION GAP SERPL CALCULATED.3IONS-SCNC: 9 MMOL/L (ref 4–13)
BASOPHILS # BLD AUTO: 0.08 THOUSANDS/ΜL (ref 0–0.1)
BASOPHILS NFR BLD AUTO: 1 % (ref 0–1)
BUN SERPL-MCNC: 33 MG/DL (ref 5–25)
CALCIUM SERPL-MCNC: 9.4 MG/DL (ref 8.3–10.1)
CHLORIDE SERPL-SCNC: 90 MMOL/L (ref 100–108)
CO2 SERPL-SCNC: 32 MMOL/L (ref 21–32)
CREAT SERPL-MCNC: 1.41 MG/DL (ref 0.6–1.3)
EOSINOPHIL # BLD AUTO: 0.21 THOUSAND/ΜL (ref 0–0.61)
EOSINOPHIL NFR BLD AUTO: 3 % (ref 0–6)
ERYTHROCYTE [DISTWIDTH] IN BLOOD BY AUTOMATED COUNT: 13.7 % (ref 11.6–15.1)
GFR SERPL CREATININE-BSD FRML MDRD: 54 ML/MIN/1.73SQ M
GLUCOSE SERPL-MCNC: 227 MG/DL (ref 65–140)
GLUCOSE SERPL-MCNC: 256 MG/DL (ref 65–140)
GLUCOSE SERPL-MCNC: 319 MG/DL (ref 65–140)
HCT VFR BLD AUTO: 43.4 % (ref 36.5–49.3)
HGB BLD-MCNC: 13.9 G/DL (ref 12–17)
IMM GRANULOCYTES # BLD AUTO: 0.05 THOUSAND/UL (ref 0–0.2)
IMM GRANULOCYTES NFR BLD AUTO: 1 % (ref 0–2)
LYMPHOCYTES # BLD AUTO: 1.99 THOUSANDS/ΜL (ref 0.6–4.47)
LYMPHOCYTES NFR BLD AUTO: 25 % (ref 14–44)
MCH RBC QN AUTO: 27.6 PG (ref 26.8–34.3)
MCHC RBC AUTO-ENTMCNC: 32 G/DL (ref 31.4–37.4)
MCV RBC AUTO: 86 FL (ref 82–98)
MONOCYTES # BLD AUTO: 0.63 THOUSAND/ΜL (ref 0.17–1.22)
MONOCYTES NFR BLD AUTO: 8 % (ref 4–12)
NEUTROPHILS # BLD AUTO: 5.11 THOUSANDS/ΜL (ref 1.85–7.62)
NEUTS SEG NFR BLD AUTO: 62 % (ref 43–75)
NRBC BLD AUTO-RTO: 0 /100 WBCS
PLATELET # BLD AUTO: 251 THOUSANDS/UL (ref 149–390)
PMV BLD AUTO: 9.9 FL (ref 8.9–12.7)
POTASSIUM SERPL-SCNC: 4 MMOL/L (ref 3.5–5.3)
RBC # BLD AUTO: 5.04 MILLION/UL (ref 3.88–5.62)
SODIUM SERPL-SCNC: 131 MMOL/L (ref 136–145)
WBC # BLD AUTO: 8.07 THOUSAND/UL (ref 4.31–10.16)

## 2020-02-11 PROCEDURE — 80048 BASIC METABOLIC PNL TOTAL CA: CPT | Performed by: INTERNAL MEDICINE

## 2020-02-11 PROCEDURE — 82948 REAGENT STRIP/BLOOD GLUCOSE: CPT

## 2020-02-11 PROCEDURE — 97530 THERAPEUTIC ACTIVITIES: CPT

## 2020-02-11 PROCEDURE — 97116 GAIT TRAINING THERAPY: CPT

## 2020-02-11 PROCEDURE — 97110 THERAPEUTIC EXERCISES: CPT

## 2020-02-11 PROCEDURE — 85025 COMPLETE CBC W/AUTO DIFF WBC: CPT | Performed by: INTERNAL MEDICINE

## 2020-02-11 PROCEDURE — 99239 HOSP IP/OBS DSCHRG MGMT >30: CPT | Performed by: INTERNAL MEDICINE

## 2020-02-11 RX ORDER — INSULIN GLARGINE 100 [IU]/ML
46 INJECTION, SOLUTION SUBCUTANEOUS 2 TIMES DAILY
Qty: 1200 UNITS | Refills: 0 | Status: SHIPPED | OUTPATIENT
Start: 2020-02-11 | End: 2020-10-29

## 2020-02-11 RX ORDER — POTASSIUM CHLORIDE 20 MEQ/1
20 TABLET, EXTENDED RELEASE ORAL DAILY
Qty: 30 TABLET | Refills: 0 | Status: SHIPPED | OUTPATIENT
Start: 2020-02-12 | End: 2020-10-29 | Stop reason: SDUPTHER

## 2020-02-11 RX ORDER — METOLAZONE 5 MG/1
5 TABLET ORAL 2 TIMES WEEKLY
Qty: 8 TABLET | Refills: 0 | Status: SHIPPED | OUTPATIENT
Start: 2020-02-13 | End: 2020-10-29 | Stop reason: SDUPTHER

## 2020-02-11 RX ORDER — TRIAMCINOLONE ACETONIDE 1 MG/G
CREAM TOPICAL 2 TIMES DAILY
Qty: 30 G | Refills: 0 | Status: SHIPPED | OUTPATIENT
Start: 2020-02-11 | End: 2021-08-29

## 2020-02-11 RX ADMIN — METOCLOPRAMIDE HYDROCHLORIDE 5 MG: 10 TABLET ORAL at 08:28

## 2020-02-11 RX ADMIN — PANTOPRAZOLE SODIUM 40 MG: 40 TABLET, DELAYED RELEASE ORAL at 08:28

## 2020-02-11 RX ADMIN — INSULIN LISPRO 3 UNITS: 100 INJECTION, SOLUTION INTRAVENOUS; SUBCUTANEOUS at 11:55

## 2020-02-11 RX ADMIN — POTASSIUM CHLORIDE 20 MEQ: 1500 TABLET, EXTENDED RELEASE ORAL at 08:29

## 2020-02-11 RX ADMIN — HEPARIN SODIUM 5000 UNITS: 5000 INJECTION INTRAVENOUS; SUBCUTANEOUS at 05:52

## 2020-02-11 RX ADMIN — INSULIN LISPRO 2 UNITS: 100 INJECTION, SOLUTION INTRAVENOUS; SUBCUTANEOUS at 08:29

## 2020-02-11 RX ADMIN — INSULIN GLARGINE 44 UNITS: 100 INJECTION, SOLUTION SUBCUTANEOUS at 08:28

## 2020-02-11 RX ADMIN — TRIAMCINOLONE ACETONIDE: 1 CREAM TOPICAL at 08:33

## 2020-02-11 RX ADMIN — CLOPIDOGREL 75 MG: 75 TABLET, FILM COATED ORAL at 08:28

## 2020-02-11 RX ADMIN — FINASTERIDE 5 MG: 5 TABLET, FILM COATED ORAL at 08:28

## 2020-02-11 NOTE — NURSING NOTE
Agree with previous assessment  Patient in bed watching TV, no complaints at this time  Personal items and call bell with in reach will continue to monitor 
Nurse assumed care of pt at 0300  Pt was asleep in recliner  I am in agreement with previous nurse's assessment  L BKA, Swelling and rash on RLE  Pt A & O x4  Uses a walker to ambulate  Pleasant and cooperative  Pt was left safe in recliner with walker nearby and call bell within reach 
Patient discharged to [place] [id number]  IV was removed  Discharge instructions were reviewed with patient and wife  Report was called to receiving facility and patient was escorted out via wheelchair accompanied by PCA 
Quality 226: Preventive Care And Screening: Tobacco Use: Screening And Cessation Intervention: Patient screened for tobacco use and is an ex/non-smoker
Detail Level: Zone

## 2020-02-11 NOTE — SOCIAL WORK
Attending reported patient would be cleared for discharge  Pt was given [place] [facility], referrals sent and pt accepted by [facility]  As per pt, wife will transport at 1:00; no CMN form required for transport or IMM since pt has [facility] insurance  Numbers for report in EPIC     Updated: attending, RN, and [facility] Liaison called pt's room to speak with pt to determine wife's pickup time so she could make the receiving floor aware       LOU Bettencourt  2/11/2020  9705

## 2020-02-11 NOTE — PHYSICAL THERAPY NOTE
Physical Therapy Progress Note     02/11/20 0903   Pain Assessment   Pain Assessment No/denies pain   Pain Score No Pain   Restrictions/Precautions   Weight Bearing Precautions Per Order No   Braces or Orthoses Prosthesis; Other (Comment)  (LLE, surgical shoe RLE for compensation of LLE prosthesis )   Other Precautions Fall Risk;Multiple lines;Telemetry   General   Chart Reviewed Yes   Response to Previous Treatment Patient reporting fatigue but able to participate  Family/Caregiver Present No   Subjective   Subjective Willing to participate in therapy this AM    Transfers   Sit to Stand 4  Minimal assistance   Additional items Assist x 1; Armrests; Increased time required;Verbal cues   Stand to Sit 4  Minimal assistance   Additional items Assist x 1; Armrests; Increased time required;Verbal cues   Ambulation/Elevation   Gait pattern Decreased foot clearance; Forward Flexion; Short stride; Excessively slow; Inconsistent gerard; Shuffling   Gait Assistance 4  Minimal assist   Additional items Assist x 1;Verbal cues; Tactile cues   Assistive Device Rolling walker   Distance 30' x 2 with a standing resting period in between   Balance   Static Sitting Fair +   Dynamic Sitting Fair   Static Standing Fair -   Dynamic Standing Poor +   Ambulatory Poor +   Endurance Deficit   Endurance Deficit Yes   Endurance Deficit Description fatigue   Activity Tolerance   Activity Tolerance Patient limited by fatigue   Nurse Made Aware Yes   Exercises   THR Sitting;10 reps;AAROM; Bilateral   Assessment   Prognosis Fair   Problem List Decreased strength;Decreased endurance;Decreased range of motion; Impaired balance;Decreased mobility;Obesity; Decreased skin integrity;Orthopedic restrictions;Pain; Impaired sensation;Decreased safety awareness   Assessment Pt  seated in bedside chair upon my arrival  Pt  reporting fatigue, however agreeable to therapeutic intervention   Performance of HEP seated in bedside chair with cues provided for proper completion  Progressed with transfers requiring A of therapist with cues for hand placement/technique  Progressed with an increased amb  trial with use of RW and A of therapist with cues provided for LE sequencing  Pt  limited by fatigue requiring a standing resting period in between gait trials  Pt  returned to room and repositioned seated in bedside chair at end of treatment session  PT will continue to recommend d/c to rehab when medically stable for continued improvement of noted impairments above  Barriers to Discharge Inaccessible home environment;Decreased caregiver support   Goals   Patient Goals To go to rehab  STG Expiration Date 02/19/20   PT Treatment Day 3   Plan   Treatment/Interventions LE strengthening/ROM; Functional transfer training; Endurance training; Therapeutic exercise;Gait training;Spoke to case management;Spoke to nursing   Progress Slow progress, decreased activity tolerance   PT Frequency Other (Comment)  (3-5x/wk)   Recommendation   Recommendation Short-term skilled PT   Equipment Recommended Wheelchair;Walker  (RW)   PT - OK to Discharge Yes  (if d/c to rehab when medically stable )     Diego Sherwood, PTA

## 2020-02-11 NOTE — PLAN OF CARE
Problem: PHYSICAL THERAPY ADULT  Goal: Performs mobility at highest level of function for planned discharge setting  See evaluation for individualized goals  Description  Treatment/Interventions: Functional transfer training, LE strengthening/ROM, Elevations, Therapeutic exercise, Endurance training, Patient/family training, Equipment eval/education, Bed mobility, Gait training, Spoke to nursing, OT  Equipment Recommended: Gaona David, Wheelchair       See flowsheet documentation for full assessment, interventions and recommendations  Outcome: Progressing  Note:   Prognosis: Fair  Problem List: Decreased strength, Decreased endurance, Decreased range of motion, Impaired balance, Decreased mobility, Obesity, Decreased skin integrity, Orthopedic restrictions, Pain, Impaired sensation, Decreased safety awareness  Assessment: Pt  seated in bedside chair upon my arrival  Pt  reporting fatigue, however agreeable to therapeutic intervention  Performance of HEP seated in bedside chair with cues provided for proper completion  Progressed with transfers requiring A of therapist with cues for hand placement/technique  Progressed with an increased amb  trial with use of RW and A of therapist with cues provided for LE sequencing  Pt  limited by fatigue requiring a standing resting period in between gait trials  Pt  returned to room and repositioned seated in bedside chair at end of treatment session  PT will continue to recommend d/c to rehab when medically stable for continued improvement of noted impairments above  Barriers to Discharge: Inaccessible home environment, Decreased caregiver support     Recommendation: Short-term skilled PT     PT - OK to Discharge: Yes(if d/c to rehab when medically stable )    See flowsheet documentation for full assessment

## 2020-02-11 NOTE — PLAN OF CARE
Problem: Potential for Falls  Goal: Patient will remain free of falls  Description  INTERVENTIONS:  - Assess patient frequently for physical needs  -  Identify cognitive and physical deficits and behaviors that affect risk of falls    -  [place] fall precautions as indicated by assessment   - Educate patient/family on patient safety including physical limitations  - Instruct patient to call for assistance with activity based on assessment  - Modify environment to reduce risk of injury  - Consider OT/PT consult to assist with strengthening/mobility  Outcome: Progressing     Problem: Prexisting or High Potential for Compromised Skin Integrity  Goal: Skin integrity is maintained or improved  Description  INTERVENTIONS:  - Identify patients at risk for skin breakdown  - Assess and monitor skin integrity  - Assess and monitor nutrition and hydration status  - Monitor labs   - Assess for incontinence   - Turn and reposition patient  - Assist with mobility/ambulation  - Relieve pressure over bony prominences  - Avoid friction and shearing  - Provide appropriate hygiene as needed including keeping skin clean and dry  - Evaluate need for skin moisturizer/barrier cream  - Collaborate with interdisciplinary team   - Patient/family teaching  - Consider wound care consult   Outcome: Progressing     Problem: CARDIOVASCULAR - ADULT  Goal: Maintains optimal cardiac output and hemodynamic stability  Description  INTERVENTIONS:  - Monitor I/O, vital signs and rhythm  - Monitor for S/S and trends of decreased cardiac output  - Administer and titrate ordered vasoactive medications to optimize hemodynamic stability  - Assess quality of pulses, skin color and temperature  - Assess for signs of decreased coronary artery perfusion  - Instruct patient to report change in severity of symptoms  Outcome: Progressing  Goal: Absence of cardiac dysrhythmias or at baseline rhythm  Description  INTERVENTIONS:  - Continuous cardiac monitoring, vital signs, obtain 12 lead EKG if ordered  - Administer antiarrhythmic and heart rate control medications as ordered  - Monitor electrolytes and administer replacement therapy as ordered  Outcome: Progressing     Problem: RESPIRATORY - ADULT  Goal: Achieves optimal ventilation and oxygenation  Description  INTERVENTIONS:  - Assess for changes in respiratory status  - Assess for changes in mentation and behavior  - Position to facilitate oxygenation and minimize respiratory effort  - Oxygen administered by appropriate delivery if ordered  - Initiate smoking cessation education as indicated  - Encourage broncho-pulmonary hygiene including cough, deep breathe, Incentive Spirometry  - Assess the need for suctioning and aspirate as needed  - Assess and instruct to report SOB or any respiratory difficulty  - Respiratory Therapy support as indicated  Outcome: Progressing     Problem: SKIN/TISSUE INTEGRITY - ADULT  Goal: Skin integrity remains intact  Description  INTERVENTIONS  - Identify patients at risk for skin breakdown  - Assess and monitor skin integrity  - Assess and monitor nutrition and hydration status  - Monitor labs (i e  albumin)  - Assess for incontinence   - Turn and reposition patient  - Assist with mobility/ambulation  - Relieve pressure over bony prominences  - Avoid friction and shearing  - Provide appropriate hygiene as needed including keeping skin clean and dry  - Evaluate need for skin moisturizer/barrier cream  - Collaborate with interdisciplinary team (i e  Nutrition, Rehabilitation, etc )   - Patient/family teaching  Outcome: Progressing     Problem: MUSCULOSKELETAL - ADULT  Goal: Maintain or return mobility to safest level of function  Description  INTERVENTIONS:  - Assess patient's ability to carry out ADLs; assess patient's baseline for ADL function and identify physical deficits which impact ability to perform ADLs (bathing, care of mouth/teeth, toileting, grooming, dressing, etc )  - Assess/evaluate cause of self-care deficits   - Assess range of motion  - Assess patient's mobility  - Assess patient's need for assistive devices and provide as appropriate  - Encourage maximum independence but intervene and supervise when necessary  - Involve family in performance of ADLs  - Assess for home care needs following discharge   - Consider OT consult to assist with ADL evaluation and planning for discharge  - Provide patient education as appropriate  Outcome: Progressing     Problem: PAIN - ADULT  Goal: Verbalizes/displays adequate comfort level or baseline comfort level  Description  Interventions:  - Encourage patient to monitor pain and request assistance  - Assess pain using appropriate pain scale  - Administer analgesics based on type and severity of pain and evaluate response  - Implement non-pharmacological measures as appropriate and evaluate response  - Consider cultural and social influences on pain and pain management  - Notify physician/advanced practitioner if interventions unsuccessful or patient reports new pain  Outcome: Progressing     Problem: INFECTION - ADULT  Goal: Absence or prevention of progression during hospitalization  Description  INTERVENTIONS:  - Assess and monitor for signs and symptoms of infection  - Monitor lab/diagnostic results  - Monitor all insertion sites, i e  indwelling lines, tubes, and drains  - Monitor endotracheal if appropriate and nasal secretions for changes in amount and color  - [place] appropriate cooling/warming therapies per order  - Administer medications as ordered  - Instruct and encourage patient and family to use good hand hygiene technique  - Identify and instruct in appropriate isolation precautions for identified infection/condition  Outcome: Progressing     Problem: DISCHARGE PLANNING  Goal: Discharge to home or other facility with appropriate resources  Description  INTERVENTIONS:  - Identify barriers to discharge w/patient and caregiver  - Arrange for needed discharge resources and transportation as appropriate  - Identify discharge learning needs (meds, wound care, etc )  - Arrange for interpretive services to assist at discharge as needed  - Refer to Case Management Department for coordinating discharge planning if the patient needs post-hospital services based on physician/advanced practitioner order or complex needs related to functional status, cognitive ability, or social support system  Outcome: Progressing     Problem: Knowledge Deficit  Goal: Patient/family/caregiver demonstrates understanding of disease process, treatment plan, medications, and discharge instructions  Description  Complete learning assessment and assess knowledge base  Interventions:  - Provide teaching at level of understanding  - Provide teaching via preferred learning methods  Outcome: Progressing     Problem: Nutrition/Hydration-ADULT  Goal: Nutrient/Hydration intake appropriate for improving, restoring or maintaining nutritional needs  Description  Monitor and assess patient's nutrition/hydration status for malnutrition  Collaborate with interdisciplinary team and initiate plan and interventions as ordered  Monitor patient's weight and dietary intake as ordered or per policy  Utilize nutrition screening tool and intervene as necessary  Determine patient's food preferences and provide high-protein, high-caloric foods as appropriate       INTERVENTIONS:  - Monitor oral intake, urinary output, labs, and treatment plans  - Assess nutrition and hydration status and recommend course of action  - Evaluate amount of meals eaten  - Assist patient with eating if necessary   - Allow adequate time for meals  - Recommend/ encourage appropriate diets, oral nutritional supplements, and vitamin/mineral supplements  - Order, calculate, and assess calorie counts as needed  - Recommend, monitor, and adjust tube feedings and TPN/PPN based on assessed needs  - Assess need for intravenous fluids  - Provide specific nutrition/hydration education as appropriate  - Include patient/family/caregiver in decisions related to nutrition  Outcome: Progressing

## 2020-02-11 NOTE — ASSESSMENT & PLAN NOTE
Lab Results   Component Value Date    HGBA1C 8 5 (H) 02/04/2020       Recent Labs     02/10/20  1709 02/10/20  2102 02/11/20  0800 02/11/20  1120   POCGLU 304* 312* 256* 319*     Continue Lantus 44 units b i d   Monitor Accu-Cheks, sliding scale for coverage

## 2020-02-11 NOTE — ASSESSMENT & PLAN NOTE
Wt Readings from Last 3 Encounters:   02/11/20 123 kg (271 lb 6 4 oz)   02/04/20 123 kg (272 lb)   10/23/19 119 kg (262 lb)     Cardiology follow-up appreciated, diuretics will be held today given acute kidney injury  Restart furosemide 40 mg p o  B i d   And metolazone 5 mg twice weekly tomorrow  Monitor I and O, fluid restriction

## 2020-02-11 NOTE — PLAN OF CARE
Problem: Potential for Falls  Goal: Patient will remain free of falls  Description  INTERVENTIONS:  - Assess patient frequently for physical needs  -  Identify cognitive and physical deficits and behaviors that affect risk of falls    -  [place] fall precautions as indicated by assessment   - Educate patient/family on patient safety including physical limitations  - Instruct patient to call for assistance with activity based on assessment  - Modify environment to reduce risk of injury  - Consider OT/PT consult to assist with strengthening/mobility  2/11/2020 1321 by Angelina Helms RN  Outcome: Adequate for Discharge  2/11/2020 0800 by Angelina Helms RN  Outcome: Progressing     Problem: Prexisting or High Potential for Compromised Skin Integrity  Goal: Skin integrity is maintained or improved  Description  INTERVENTIONS:  - Identify patients at risk for skin breakdown  - Assess and monitor skin integrity  - Assess and monitor nutrition and hydration status  - Monitor labs   - Assess for incontinence   - Turn and reposition patient  - Assist with mobility/ambulation  - Relieve pressure over bony prominences  - Avoid friction and shearing  - Provide appropriate hygiene as needed including keeping skin clean and dry  - Evaluate need for skin moisturizer/barrier cream  - Collaborate with interdisciplinary team   - Patient/family teaching  - Consider wound care consult   2/11/2020 1321 by Angelina Helms RN  Outcome: Adequate for Discharge  2/11/2020 0800 by Angelina Helms RN  Outcome: Progressing     Problem: CARDIOVASCULAR - ADULT  Goal: Maintains optimal cardiac output and hemodynamic stability  Description  INTERVENTIONS:  - Monitor I/O, vital signs and rhythm  - Monitor for S/S and trends of decreased cardiac output  - Administer and titrate ordered vasoactive medications to optimize hemodynamic stability  - Assess quality of pulses, skin color and temperature  - Assess for signs of decreased coronary artery perfusion  - Instruct patient to report change in severity of symptoms  2/11/2020 1321 by Tr Handy RN  Outcome: Adequate for Discharge  2/11/2020 0800 by Tr Handy RN  Outcome: Progressing  Goal: Absence of cardiac dysrhythmias or at baseline rhythm  Description  INTERVENTIONS:  - Continuous cardiac monitoring, vital signs, obtain 12 lead EKG if ordered  - Administer antiarrhythmic and heart rate control medications as ordered  - Monitor electrolytes and administer replacement therapy as ordered  2/11/2020 1321 by Tr Handy RN  Outcome: Adequate for Discharge  2/11/2020 0800 by Tr Handy RN  Outcome: Progressing     Problem: RESPIRATORY - ADULT  Goal: Achieves optimal ventilation and oxygenation  Description  INTERVENTIONS:  - Assess for changes in respiratory status  - Assess for changes in mentation and behavior  - Position to facilitate oxygenation and minimize respiratory effort  - Oxygen administered by appropriate delivery if ordered  - Initiate smoking cessation education as indicated  - Encourage broncho-pulmonary hygiene including cough, deep breathe, Incentive Spirometry  - Assess the need for suctioning and aspirate as needed  - Assess and instruct to report SOB or any respiratory difficulty  - Respiratory Therapy support as indicated  2/11/2020 1321 by Tr Handy RN  Outcome: Adequate for Discharge  2/11/2020 0800 by Tr Handy RN  Outcome: Progressing     Problem: SKIN/TISSUE INTEGRITY - ADULT  Goal: Skin integrity remains intact  Description  INTERVENTIONS  - Identify patients at risk for skin breakdown  - Assess and monitor skin integrity  - Assess and monitor nutrition and hydration status  - Monitor labs (i e  albumin)  - Assess for incontinence   - Turn and reposition patient  - Assist with mobility/ambulation  - Relieve pressure over bony prominences  - Avoid friction and shearing  - Provide appropriate hygiene as needed including keeping skin clean and dry  - Evaluate need for skin moisturizer/barrier cream  - Collaborate with interdisciplinary team (i e  Nutrition, Rehabilitation, etc )   - Patient/family teaching  2/11/2020 1321 by Beth Scott RN  Outcome: Adequate for Discharge  2/11/2020 0800 by Beth Scott RN  Outcome: Progressing     Problem: MUSCULOSKELETAL - ADULT  Goal: Maintain or return mobility to safest level of function  Description  INTERVENTIONS:  - Assess patient's ability to carry out ADLs; assess patient's baseline for ADL function and identify physical deficits which impact ability to perform ADLs (bathing, care of mouth/teeth, toileting, grooming, dressing, etc )  - Assess/evaluate cause of self-care deficits   - Assess range of motion  - Assess patient's mobility  - Assess patient's need for assistive devices and provide as appropriate  - Encourage maximum independence but intervene and supervise when necessary  - Involve family in performance of ADLs  - Assess for home care needs following discharge   - Consider OT consult to assist with ADL evaluation and planning for discharge  - Provide patient education as appropriate  2/11/2020 1321 by Beth Scott RN  Outcome: Adequate for Discharge  2/11/2020 0800 by Beth Scott RN  Outcome: Progressing     Problem: PAIN - ADULT  Goal: Verbalizes/displays adequate comfort level or baseline comfort level  Description  Interventions:  - Encourage patient to monitor pain and request assistance  - Assess pain using appropriate pain scale  - Administer analgesics based on type and severity of pain and evaluate response  - Implement non-pharmacological measures as appropriate and evaluate response  - Consider cultural and social influences on pain and pain management  - Notify physician/advanced practitioner if interventions unsuccessful or patient reports new pain  2/11/2020 1321 by Beth Scott RN  Outcome: Adequate for Discharge  2/11/2020 0800 by Beth Scott RN  Outcome: Progressing     Problem: INFECTION - ADULT  Goal: Absence or prevention of progression during hospitalization  Description  INTERVENTIONS:  - Assess and monitor for signs and symptoms of infection  - Monitor lab/diagnostic results  - Monitor all insertion sites, i e  indwelling lines, tubes, and drains  - Monitor endotracheal if appropriate and nasal secretions for changes in amount and color  - [place] appropriate cooling/warming therapies per order  - Administer medications as ordered  - Instruct and encourage patient and family to use good hand hygiene technique  - Identify and instruct in appropriate isolation precautions for identified infection/condition  2/11/2020 1321 by Juliet Loja RN  Outcome: Adequate for Discharge  2/11/2020 0800 by Juliet Loja RN  Outcome: Progressing     Problem: DISCHARGE PLANNING  Goal: Discharge to home or other facility with appropriate resources  Description  INTERVENTIONS:  - Identify barriers to discharge w/patient and caregiver  - Arrange for needed discharge resources and transportation as appropriate  - Identify discharge learning needs (meds, wound care, etc )  - Arrange for interpretive services to assist at discharge as needed  - Refer to Case Management Department for coordinating discharge planning if the patient needs post-hospital services based on physician/advanced practitioner order or complex needs related to functional status, cognitive ability, or social support system  2/11/2020 1321 by Juliet Loja RN  Outcome: Adequate for Discharge  2/11/2020 0800 by Juliet Loja RN  Outcome: Progressing     Problem: Knowledge Deficit  Goal: Patient/family/caregiver demonstrates understanding of disease process, treatment plan, medications, and discharge instructions  Description  Complete learning assessment and assess knowledge base    Interventions:  - Provide teaching at level of understanding  - Provide teaching via preferred learning methods  2/11/2020 1321 by Jaleel Maurer RN  Outcome: Adequate for Discharge  2/11/2020 0800 by Jaleel Maurer RN  Outcome: Progressing     Problem: Nutrition/Hydration-ADULT  Goal: Nutrient/Hydration intake appropriate for improving, restoring or maintaining nutritional needs  Description  Monitor and assess patient's nutrition/hydration status for malnutrition  Collaborate with interdisciplinary team and initiate plan and interventions as ordered  Monitor patient's weight and dietary intake as ordered or per policy  Utilize nutrition screening tool and intervene as necessary  Determine patient's food preferences and provide high-protein, high-caloric foods as appropriate       INTERVENTIONS:  - Monitor oral intake, urinary output, labs, and treatment plans  - Assess nutrition and hydration status and recommend course of action  - Evaluate amount of meals eaten  - Assist patient with eating if necessary   - Allow adequate time for meals  - Recommend/ encourage appropriate diets, oral nutritional supplements, and vitamin/mineral supplements  - Order, calculate, and assess calorie counts as needed  - Recommend, monitor, and adjust tube feedings and TPN/PPN based on assessed needs  - Assess need for intravenous fluids  - Provide specific nutrition/hydration education as appropriate  - Include patient/family/caregiver in decisions related to nutrition  2/11/2020 1321 by Jaleel Maurer RN  Outcome: Adequate for Discharge  2/11/2020 0800 by Jaleel Maurer RN  Outcome: Progressing

## 2020-02-11 NOTE — PLAN OF CARE
Problem: Potential for Falls  Goal: Patient will remain free of falls  Description  INTERVENTIONS:  - Assess patient frequently for physical needs  -  Identify cognitive and physical deficits and behaviors that affect risk of falls    -  [place] fall precautions as indicated by assessment   - Educate patient/family on patient safety including physical limitations  - Instruct patient to call for assistance with activity based on assessment  - Modify environment to reduce risk of injury  - Consider OT/PT consult to assist with strengthening/mobility  Outcome: Progressing     Problem: Prexisting or High Potential for Compromised Skin Integrity  Goal: Skin integrity is maintained or improved  Description  INTERVENTIONS:  - Identify patients at risk for skin breakdown  - Assess and monitor skin integrity  - Assess and monitor nutrition and hydration status  - Monitor labs   - Assess for incontinence   - Turn and reposition patient  - Assist with mobility/ambulation  - Relieve pressure over bony prominences  - Avoid friction and shearing  - Provide appropriate hygiene as needed including keeping skin clean and dry  - Evaluate need for skin moisturizer/barrier cream  - Collaborate with interdisciplinary team   - Patient/family teaching  - Consider wound care consult   Outcome: Progressing     Problem: CARDIOVASCULAR - ADULT  Goal: Maintains optimal cardiac output and hemodynamic stability  Description  INTERVENTIONS:  - Monitor I/O, vital signs and rhythm  - Monitor for S/S and trends of decreased cardiac output  - Administer and titrate ordered vasoactive medications to optimize hemodynamic stability  - Assess quality of pulses, skin color and temperature  - Assess for signs of decreased coronary artery perfusion  - Instruct patient to report change in severity of symptoms  Outcome: Progressing  Goal: Absence of cardiac dysrhythmias or at baseline rhythm  Description  INTERVENTIONS:  - Continuous cardiac monitoring, vital signs, obtain 12 lead EKG if ordered  - Administer antiarrhythmic and heart rate control medications as ordered  - Monitor electrolytes and administer replacement therapy as ordered  Outcome: Progressing     Problem: RESPIRATORY - ADULT  Goal: Achieves optimal ventilation and oxygenation  Description  INTERVENTIONS:  - Assess for changes in respiratory status  - Assess for changes in mentation and behavior  - Position to facilitate oxygenation and minimize respiratory effort  - Oxygen administered by appropriate delivery if ordered  - Initiate smoking cessation education as indicated  - Encourage broncho-pulmonary hygiene including cough, deep breathe, Incentive Spirometry  - Assess the need for suctioning and aspirate as needed  - Assess and instruct to report SOB or any respiratory difficulty  - Respiratory Therapy support as indicated  Outcome: Progressing     Problem: SKIN/TISSUE INTEGRITY - ADULT  Goal: Skin integrity remains intact  Description  INTERVENTIONS  - Identify patients at risk for skin breakdown  - Assess and monitor skin integrity  - Assess and monitor nutrition and hydration status  - Monitor labs (i e  albumin)  - Assess for incontinence   - Turn and reposition patient  - Assist with mobility/ambulation  - Relieve pressure over bony prominences  - Avoid friction and shearing  - Provide appropriate hygiene as needed including keeping skin clean and dry  - Evaluate need for skin moisturizer/barrier cream  - Collaborate with interdisciplinary team (i e  Nutrition, Rehabilitation, etc )   - Patient/family teaching  Outcome: Progressing     Problem: MUSCULOSKELETAL - ADULT  Goal: Maintain or return mobility to safest level of function  Description  INTERVENTIONS:  - Assess patient's ability to carry out ADLs; assess patient's baseline for ADL function and identify physical deficits which impact ability to perform ADLs (bathing, care of mouth/teeth, toileting, grooming, dressing, etc )  - Assess/evaluate cause of self-care deficits   - Assess range of motion  - Assess patient's mobility  - Assess patient's need for assistive devices and provide as appropriate  - Encourage maximum independence but intervene and supervise when necessary  - Involve family in performance of ADLs  - Assess for home care needs following discharge   - Consider OT consult to assist with ADL evaluation and planning for discharge  - Provide patient education as appropriate  Outcome: Progressing     Problem: PAIN - ADULT  Goal: Verbalizes/displays adequate comfort level or baseline comfort level  Description  Interventions:  - Encourage patient to monitor pain and request assistance  - Assess pain using appropriate pain scale  - Administer analgesics based on type and severity of pain and evaluate response  - Implement non-pharmacological measures as appropriate and evaluate response  - Consider cultural and social influences on pain and pain management  - Notify physician/advanced practitioner if interventions unsuccessful or patient reports new pain  Outcome: Progressing     Problem: INFECTION - ADULT  Goal: Absence or prevention of progression during hospitalization  Description  INTERVENTIONS:  - Assess and monitor for signs and symptoms of infection  - Monitor lab/diagnostic results  - Monitor all insertion sites, i e  indwelling lines, tubes, and drains  - Monitor endotracheal if appropriate and nasal secretions for changes in amount and color  - [place] appropriate cooling/warming therapies per order  - Administer medications as ordered  - Instruct and encourage patient and family to use good hand hygiene technique  - Identify and instruct in appropriate isolation precautions for identified infection/condition  Outcome: Progressing     Problem: DISCHARGE PLANNING  Goal: Discharge to home or other facility with appropriate resources  Description  INTERVENTIONS:  - Identify barriers to discharge w/patient and caregiver  - Arrange for needed discharge resources and transportation as appropriate  - Identify discharge learning needs (meds, wound care, etc )  - Arrange for interpretive services to assist at discharge as needed  - Refer to Case Management Department for coordinating discharge planning if the patient needs post-hospital services based on physician/advanced practitioner order or complex needs related to functional status, cognitive ability, or social support system  Outcome: Progressing     Problem: Knowledge Deficit  Goal: Patient/family/caregiver demonstrates understanding of disease process, treatment plan, medications, and discharge instructions  Description  Complete learning assessment and assess knowledge base  Interventions:  - Provide teaching at level of understanding  - Provide teaching via preferred learning methods  Outcome: Progressing     Problem: Nutrition/Hydration-ADULT  Goal: Nutrient/Hydration intake appropriate for improving, restoring or maintaining nutritional needs  Description  Monitor and assess patient's nutrition/hydration status for malnutrition  Collaborate with interdisciplinary team and initiate plan and interventions as ordered  Monitor patient's weight and dietary intake as ordered or per policy  Utilize nutrition screening tool and intervene as necessary  Determine patient's food preferences and provide high-protein, high-caloric foods as appropriate       INTERVENTIONS:  - Monitor oral intake, urinary output, labs, and treatment plans  - Assess nutrition and hydration status and recommend course of action  - Evaluate amount of meals eaten  - Assist patient with eating if necessary   - Allow adequate time for meals  - Recommend/ encourage appropriate diets, oral nutritional supplements, and vitamin/mineral supplements  - Order, calculate, and assess calorie counts as needed  - Recommend, monitor, and adjust tube feedings and TPN/PPN based on assessed needs  - Assess need for intravenous fluids  - Provide specific nutrition/hydration education as appropriate  - Include patient/family/caregiver in decisions related to nutrition  Outcome: Progressing

## 2020-02-11 NOTE — DISCHARGE SUMMARY
Discharge- Broderick Rubinstein 1960, 60 y o  male MRN: [id number]    Unit/Bed#: [facility] [address] 218-02 Encounter: [id number]    Primary Care Provider: Blanca Rain DO   Date and time admitted to hospital: 2/4/2020  5:18 PM        * Acute on chronic diastolic heart failure [facility])  Assessment & Plan  Wt Readings from Last 3 Encounters:   02/11/20 123 kg (271 lb 6 4 oz)   02/04/20 123 kg (272 lb)   10/23/19 119 kg (262 lb)     Cardiology follow-up appreciated, diuretics will be held today given acute kidney injury  furosemide 40 mg p o  B i d  And metolazone 5 mg twice weekly     Elephantiasis nostras verrucosa  Assessment & Plan  Of right lower extremity  Dermatology consultation and input appreciated  Recommended elevation try wound care/lymphedema clinic  Triamcinolone    CAD (coronary artery disease)  Assessment & Plan  S/p cabg  No cp  Continue plavix statin    Amputated below knee, left (HCC)  Assessment & Plan  W/left knee amputation    Class 2 severe obesity due to excess calories with serious comorbidity and body mass index (BMI) of 35 0 to 35 9 in adult [facility])  Assessment & Plan  Encourage weight loss    PAD (peripheral artery disease) ([address] )  Assessment & Plan  S/p BKA to left leg   -Recent LEADS w/50-75% stenosis in the proximal popliteal artery, and diffuse  atherosclerotic arterial disease throughout the remaining portions of the  femoropopliteal vessels  continue statin/plavix      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 [facility])  Assessment & Plan  Lab Results   Component Value Date    HGBA1C 8 5 (H) 02/04/2020       Recent Labs     02/10/20  1709 02/10/20  2102 02/11/20  0800 02/11/20  1120   POCGLU 304* 312* 256* 319*     Lantus increased to 46 units b i d      Hyperlipidemia  Assessment & Plan  Continue statin    Acute kidney injury  Likely secondary to IV diuresis  Creatinine stable at 1 4  Patient recommended to have BMP done at rehab and monitor renal function      Transition of Care Discharge Summary - [facility]    Patient Information: Papi Santos 60 y o  male MRN: [id number]  Unit/Bed#: [address] [facility] 87 218-02 Encounter: [id number]    Discharging Physician / Practitioner: Kathryn Moody MD  PCP: Juan Weathers DO  Admission Date: 2/4/2020  Discharge Date: 02/11/20    Disposition:      [address]  at [facility]    For Discharges to [facility] SNF:   · Not Applicable to this Patient - Not Applicable to this Patient    Reason for Admission: dyspnea    Discharge Diagnoses:     Principal Problem:    Acute on chronic diastolic heart failure (HCC)  Active Problems:    Anxiety and depression    Hyperlipidemia    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 ([facility])    PAD (peripheral artery disease) (HCC)    S/P BKA (below knee amputation), left ([facility])    Class 2 severe obesity due to excess calories with serious comorbidity and body mass index (BMI) of 35 0 to 35 9 in adult [facility])    Obstructive sleep apnea    Amputated below knee, left ([facility])    CAD (coronary artery disease)    Nodular rash    Elephantiasis nostras verrucosa  Resolved Problems:    * No resolved hospital problems  *      Consultations During Hospital Stay:  · cardiology    Medication Adjustments and Discharge Medications:  · Medication Dosing Tapers - Please refer to Discharge Medication List for details on any medication dosing tapers (if applicable to patient)  · Discharge Medication List: See after visit summary for reconciled discharge medications  Wound Care Recommendations:  When applicable, please see wound care section of After Visit Summary  Diet Recommendations at Discharge:  Diet -        Diet Orders   (From admission, onward)             Start     Ordered    02/04/20 2101  Diet Cardiovascular;  Sodium 2 GM  Diet effective now     Question Answer Comment   Diet Type Cardiovascular    Cardiac Sodium 2 GM    RD to adjust diet per protocol? Yes        02/04/20 2100              Fluid Restriction - Continue Fluid Restriction as Listed Above at Discharge  Significant Findings / Test Results:     CXR:Stable opacity at the left base best seen on lateral view which may represent a loculated pleural effusion  No new focal consolidation  CTA:Limited study due to streak artifact from patient's body habitus  No evidence of pulmonary embolus     Small left-sided pleural effusion with overlying compressive atelectasis    RLE Doppler: neg for DVT    Hospital Course:     Tom Pearl is a 60 y o  male patient who originally presented to the hospital on 2/4/2020 due to worsening dyspnea for 2 weeks  Patient has history of chronic diastolic heart failure, CAD, diabetes mellitus, hypertension, hyperlipidemia, peripheral arterial disease status post left BKA, chronic right lower extremity edema  Patient complained of having worsening dyspnea with exertion and lower extremity edema over the past 2 weeks  Cardiology consulted and patient started on IV diuresis  Echocardiogram revealed grade 1 diastolic dysfunction, wall thickness moderately increased, study inadequate for evaluation of regional wall motion abnormalities  Patient symptoms gradually improved, PT recommending rehab and patient agreeable  Patient did have some acute kidney injury with IV diuresis which were held for 1 day  Patient has not resumed on Lasix 40 mg p o  B i d  And metolazone 5 mg twice weekly  Patient is stable for discharge to rehab today and will follow with cardiology outpatient      Condition at Discharge: good     Discharge Day Visit / Exam:     Subjective:  Patient seen and examined myself, currently denies any complaints    Vitals: Blood Pressure: 125/76 (02/11/20 0720)  Pulse: 80 (02/11/20 0720)  Temperature: 98 5 °F (36 9 °C) (02/10/20 1526)  Temp Source: Oral (02/10/20 1526)  Respirations: 20 (02/11/20 0720)  Weight - Scale: 123 kg (271 lb 6 4 oz) (02/11/20 0730)  SpO2: 92 % (02/11/20 0720)    Physical Exam:    Constitutional: Patient is oriented to person, place and time, no acute distress  HEENT:  Normocephalic, atraumatic  Cardiovascular: Normal S1S2, RRR, No murmurs/rubs/gallops appreciated  Pulmonary:  Bilateral air entry, No rhonchi/rales/wheezing appreciated  Abdominal: Soft, Bowel sounds present, Non-tender, Non-distended  Extremities:  No cyanosis, clubbing or edema  Neurological: Cranial nerves II-XII grossly intact, sensation intact, otherwise no focal neurological symptoms  Discharge instructions/Information to patient and family:   See after visit summary section titled Discharge Instructions for information provided to patient and family  Planned Readmission: no      Discharge Statement:  I spent 30 minutes discharging the patient  This time was spent on the day of discharge  I had direct contact with the patient on the day of discharge  Greater than 50% of the total time was spent examining patient, answering all patient questions, arranging and discussing plan of care with patient as well as directly providing post-discharge instructions  Additional time then spent on discharge activities      ** Please Note: This note has been constructed using a voice recognition system **

## 2020-02-12 ENCOUNTER — TRANSITIONAL CARE MANAGEMENT (OUTPATIENT)
Dept: FAMILY MEDICINE CLINIC | Facility: CLINIC | Age: 60
End: 2020-02-12

## 2020-02-16 NOTE — ED PROVIDER NOTES
History  Chief Complaint   Patient presents with    High Blood Pressure     autonomic dysfunction  blood pressure normally low but tonight high  "im out of control diabetic " no weakness  no speech issues  BP elevated at home  Patient signed in stating that he was concerned he could be having a stroke and was brought immediately back  I saw the patient immediately upon being roomed  He states he thought was having a stroke because his blood pressure was elevated; when is normally lower; because he has autonomic dysfunction he states and orthostatic hypotension  He took his blood pressure standing was elevated he looked it up online and said it could be sign of a stroke and that is how he arrive  Denies any dysarthria; or any confusion  Wife at bedside confirms this  No weakness or changes in sensation  Chronically has altered sensation in lower extremities due to diabetes  Point of care blood blood glucose 250; but he is an insulin-dependent diabetic  No cranial nerve findings; motor strength 5/5 all extremities  Patient GCS 15  Patient denies any chest pain; or new shortness of breath  He states when he gets worked up he feels is hard to swallow; he intermittently feels short of breath; this has been going on for weeks  His sat is 99%; heart rate is 84; patient is hypertensive 197/84  He denies any new abdominal symptoms  Denies dysuria frequency  Denies melena; or hematochezia  His exam is completely unremarkable; patient does feel anxious at this time  Upon leaving the room he states that he is anxious; asks to give him something to calm his nerves down  I explained I cannot prescribe chronically; could give single dose of medication to assist with calming him down  Isolated systolic hypertension however given greater than 1 week of intermittent shortness of breath; will check cardiac workup    Clinically and based on history I do not suspect pulmonary embolism given saturation of 99% that symptoms coming go and only their only portions of each day  Cardiac workup if unremarkable will have patient follow up with PCP  Prior to Admission Medications   Prescriptions Last Dose Informant Patient Reported? Taking?   atorvastatin (LIPITOR) 40 mg tablet   Yes Yes   Sig: Take 40 mg by mouth   insulin aspart protamine-insulin aspart (NOVOLOG MIX 70/30 FLEXPEN) 100 Units/mL SUPN   Yes Yes   Sig: Inject under the skin Twice daily   metoclopramide (REGLAN) 5 mg tablet   Yes No   Sig: Take by mouth   midodrine (PROAMATINE) 5 mg tablet   Yes Yes   Sig: Take 5 mg by mouth      Facility-Administered Medications: None       Past Medical History:   Diagnosis Date    Diabetes mellitus ([facility] [address] )     Orthostatic hypotension        History reviewed  No pertinent surgical history  History reviewed  No pertinent family history  I have reviewed and agree with the history as documented  Social History   Substance Use Topics    Smoking status: Never Smoker    Smokeless tobacco: Never Used    Alcohol use No        Review of Systems   Constitutional: Negative for activity change, appetite change, chills and fever  HENT: Negative for congestion, rhinorrhea and sore throat  Eyes: Negative for photophobia and pain  Respiratory: Positive for shortness of breath  Negative for cough, chest tightness and wheezing  On and off x1 week   Cardiovascular: Negative for chest pain, palpitations and leg swelling  Gastrointestinal: Negative for abdominal distention, abdominal pain, constipation, diarrhea, nausea and vomiting  Genitourinary: Negative for difficulty urinating, dysuria, flank pain, frequency and hematuria  Musculoskeletal: Negative for arthralgias, back pain, myalgias, neck pain and neck stiffness  Skin: Negative for color change, pallor, rash and wound  Neurological: Negative for seizures, syncope, speech difficulty, weakness, light-headedness, numbness and headaches     Hematological: Negative for adenopathy  Psychiatric/Behavioral: Negative for agitation, confusion, hallucinations and suicidal ideas  Physical Exam  ED Triage Vitals   Temperature Pulse Respirations Blood Pressure SpO2   12/15/17 2244 12/15/17 2244 12/15/17 2244 12/15/17 2244 12/15/17 2244   98 1 °F (36 7 °C) 85 20 (!) 197/84 98 %      Temp Source Heart Rate Source Patient Position - Orthostatic VS BP Location FiO2 (%)   12/15/17 2244 12/15/17 2244 12/15/17 2338 12/15/17 2338 --   Oral Monitor Sitting Right arm       Pain Score       12/15/17 2244       3           Orthostatic Vital Signs  Vitals:    12/15/17 2244 12/15/17 2338 12/16/17 0215 12/16/17 0250   BP: (!) 197/84 137/62 (!) 171/80 157/70   Pulse: 85 69 80 79   Patient Position - Orthostatic VS:  Sitting Lying Lying       Physical Exam   Constitutional: He is oriented to person, place, and time  He appears well-developed and well-nourished  No distress  HENT:   Head: Normocephalic and atraumatic  Right Ear: External ear normal    Left Ear: External ear normal    Mouth/Throat: Oropharynx is clear and moist  No oropharyngeal exudate  Eyes: Conjunctivae and EOM are normal  Pupils are equal, round, and reactive to light  Right eye exhibits no discharge  Left eye exhibits no discharge  Neck: Normal range of motion  Neck supple  No JVD present  No tracheal deviation present  Cardiovascular: Normal rate, normal heart sounds and intact distal pulses  No murmur heard  Pulmonary/Chest: Effort normal and breath sounds normal  No respiratory distress  He has no wheezes  He has no rales  Abdominal: Soft  Bowel sounds are normal  He exhibits no distension  There is no tenderness  There is no rebound and no guarding  States has hernia like abdomen  When he flexes I do not visualize this  Possibly slight weakening of abdominal muscles  Nontender x4  Musculoskeletal: Normal range of motion  He exhibits no edema, tenderness or deformity     Lymphadenopathy: He has no cervical adenopathy  Neurological: He is alert and oriented to person, place, and time  No cranial nerve deficit  He exhibits normal muscle tone  Thorough neuro exam done  Plantar flexion dorsiflexion and hip flexion 5/5 and symmetrical   Biceps triceps  strength 5/5 and symmetrical  No dysarthria no confusion  Patient able to whistle so fine motor single muscles intact  Cranial nerves 2-12 intact  Follows all commands  Skin: Skin is warm and dry  Capillary refill takes less than 2 seconds  He is not diaphoretic  No erythema  No pallor  Psychiatric: He has a normal mood and affect  His behavior is normal  Judgment and thought content normal        ED Medications  Medications   LORazepam (ATIVAN) 2 mg/mL injection 0 5 mg (0 5 mg Intravenous Given 12/15/17 3306)       Diagnostic Studies  Results Reviewed     Procedure Component Value Units Date/Time    POCT troponin [[id number]]  (Normal) Collected:  12/16/17 0217    Lab Status:  Final result Updated:  12/16/17 0243     POC Troponin I 0 01 ng/ml      Specimen Type VENOUS    Narrative:         [employer] i-Stat handheld analyzer 99% cutoff is > 0 08ng/mL in [facility] Emergency Departments    o cTnI 99% cutoff is useful only when applied to patients in the clinical setting of myocardial ischemia  o cTnI 99% cutoff should be interpreted in the context of clinical history, ECG findings and possibly cardiac imaging to establish correct diagnosis  o cTnI 99% cutoff may be suggestive but clearly not indicative of a coronary event without the clinical setting of myocardial ischemia      Hepatic function panel [[id number]]  (Normal) Collected:  12/16/17 0000    Lab Status:  Final result Specimen:  Blood from Hand, Left Updated:  12/16/17 0035     Total Bilirubin 0 49 mg/dL      Bilirubin, Direct 0 07 mg/dL      Alkaline Phosphatase 53 U/L      AST 34 U/L      ALT 41 U/L      Total Protein 7 7 g/dL      Albumin 3 5 g/dL     B-type natriuretic peptide [[id number]] (Abnormal) Collected:  12/16/17 0000    Lab Status:  Final result Specimen:  Blood from Hand, Left Updated:  12/16/17 0027     NT-proBNP 794 (H) pg/mL     CBC and differential [[id number]]  (Normal) Collected:  12/15/17 2304    Lab Status:  Final result Specimen:  Blood from Hand, Left Updated:  12/15/17 2324     WBC 8 98 Thousand/uL      RBC 5 19 Million/uL      Hemoglobin 14 7 g/dL      Hematocrit 44 9 %      MCV 87 fL      MCH 28 3 pg      MCHC 32 7 g/dL      RDW 13 6 %      MPV 10 6 fL      Platelets 704 Thousands/uL      Neutrophils Relative 54 %      Lymphocytes Relative 33 %      Monocytes Relative 8 %      Eosinophils Relative 4 %      Basophils Relative 1 %      Neutrophils Absolute 4 90 Thousands/µL      Lymphocytes Absolute 2 96 Thousands/µL      Monocytes Absolute 0 75 Thousand/µL      Eosinophils Absolute 0 32 Thousand/µL      Basophils Absolute 0 05 Thousands/µL     POCT troponin [[id number]]  (Normal) Collected:  12/15/17 2308    Lab Status:  Final result Updated:  12/15/17 2321     POC Troponin I 0 00 ng/ml      Specimen Type VENOUS    Narrative:         [employer] i-Stat handheld analyzer 99% cutoff is > 0 08ng/mL in [facility] Emergency Departments    o cTnI 99% cutoff is useful only when applied to patients in the clinical setting of myocardial ischemia  o cTnI 99% cutoff should be interpreted in the context of clinical history, ECG findings and possibly cardiac imaging to establish correct diagnosis  o cTnI 99% cutoff may be suggestive but clearly not indicative of a coronary event without the clinical setting of myocardial ischemia      Basic metabolic panel [[id number]]  (Abnormal) Collected:  12/15/17 2304    Lab Status:  Final result Specimen:  Blood from Hand, Left Updated:  12/15/17 2319     Sodium 137 mmol/L      Potassium 5 3 mmol/L      Chloride 101 mmol/L      CO2 28 mmol/L      Anion Gap 8 mmol/L      BUN 14 mg/dL      Creatinine 1 03 mg/dL      Glucose 242 (H) mg/dL      Calcium 10 0 mg/dL eGFR 80 ml/min/1 73sq m     Narrative:         National Kidney Disease Education Program recommendations are as follows:  GFR calculation is accurate only with a steady state creatinine  Chronic Kidney disease less than 60 ml/min/1 73 sq  meters  Kidney failure less than 15 ml/min/1 73 sq  meters  Fingerstick Glucose (POCT) [[id number]]  (Abnormal) Collected:  12/15/17 2303    Lab Status:  Final result Updated:  12/15/17 2303     POC Glucose 250 (H) mg/dl                  XR chest 2 views   ED Interpretation by Hilary Da Silva DO (12/15 3799)   Wet read no acute findings      Final Result by Honey Smyth MD (12/16 4159)      No active pulmonary disease  Workstation performed: [id number]               Procedures  ECG 12 Lead Documentation  Date/Time: 12/15/2017 11:18 PM  Performed by: Stuart Form  Authorized by: Michael Mehta     Indications / Diagnosis:  Sob 1 wk  ECG reviewed by me, the ED Provider: yes    Patient location:  ED  Previous ECG:     Previous ECG:  Unavailable  Interpretation:     Interpretation: normal    Rate:     ECG rate:  68    ECG rate assessment: normal    Rhythm:     Rhythm: sinus rhythm    Ectopy:     Ectopy: none    QRS:     QRS axis:  Normal    QRS intervals:  Normal  Conduction:     Conduction: normal    ST segments:     ST segments:  Non-specific    Depression:  V6  T waves:     T waves: non-specific            Phone Consults  ED Phone Contact    ED Course  ED Course as of Dec 18 0046   Fri Dec 15, 2017   2306 Uses insulin for diabetes POC Glucose: (!) 250   2333 Blood pressure now 137/64 patient states symptoms resolve     2338 Slightly hemolyzed Potassium: 5 3   2347 Repeat EKG shows reduction of nonspecific ST depression in V5 and V6      Sat Dec 16, 2017   0002 Delta troponin and EKG in for 2:08 POC Troponin I: 0 00         HEART Risk Score    Flowsheet Row Most Recent Value   History  0 Filed at: 12/15/2017 2356   ECG  1 Filed at: 12/15/2017 2356   Age  1 Filed at: 12/15/2017 2356   Risk Factors  1 Filed at: 12/15/2017 2356   Troponin  0 Filed at: 12/15/2017 2356   Heart Score Risk Calculator   History  0 Filed at: 12/15/2017 2356   ECG  1 Filed at: 12/15/2017 2356   Age  1 Filed at: 12/15/2017 2356   Risk Factors  1 Filed at: 12/15/2017 2356   Troponin  0 Filed at: 12/15/2017 2356   HEART Score  3 Filed at: 12/15/2017 2356   HEART Score  3 Filed at: 12/15/2017 2356                            MDM  Number of Diagnoses or Management Options  Hypertension:   SOB (shortness of breath):   Diagnosis management comments: Delta troponin and delta EKG without change  Blood pressure did reduce modestly  Remainder of lab workup unremarkable except for slightly elevated BNP  Urged to follow up with family physician  No neurological symptoms were present throughout his time in department  No lab findings to suggest hypertensive urgency/emergency  Patient discharged with close primary care physician follow-up  Discussed with patient in depth that isolated asymptomatic systolic hypertension is not something we routinely treat in the department  However if he develops any symptoms he should return immediately for re-evaluation  CritCare Time    Disposition  Final diagnoses:   Hypertension   SOB (shortness of breath)     Time reflects when diagnosis was documented in both MDM as applicable and the Disposition within this note     Time User Action Codes Description Comment    12/16/2017 12:03 AM Leilani Keto Add [I10] Hypertension     12/16/2017 12:03 AM Leilani Keto Add [R06 02] SOB (shortness of breath)       ED Disposition     ED Disposition Condition Comment    Discharge  Lisa Henderson discharge to home/self care      Condition at discharge: Good        Follow-up Information     Follow up With Specialties Details Why Contact Info    Cira Peña DO Family Medicine Schedule an appointment as soon as possible for a visit in 2 days Discuss ER visit and further mgmt [facility] [address]  [phone]          Discharge Medication List as of 12/16/2017  2:42 AM      CONTINUE these medications which have NOT CHANGED    Details   atorvastatin (LIPITOR) 40 mg tablet Take 40 mg by mouth, Starting Thu 3/2/2017, Historical Med      insulin aspart protamine-insulin aspart (NOVOLOG MIX 70/30 FLEXPEN) 100 Units/mL SUPN Inject under the skin Twice daily, Starting Wed 2/11/2015, Historical Med      midodrine (PROAMATINE) 5 mg tablet Take 5 mg by mouth, Starting Thu 3/2/2017, Historical Med      metoclopramide (REGLAN) 5 mg tablet Take by mouth, Historical Med           No discharge procedures on file  ED Provider  Attending physically available and evaluated Kira Carrington I managed the patient along with the ED Attending      Electronically Signed by         Blanche Bal DO  Resident  12/18/17 1058 2.09

## 2020-03-20 DIAGNOSIS — E11.43 DIABETIC AUTONOMIC NEUROPATHY ASSOCIATED WITH TYPE 2 DIABETES MELLITUS (HCC): Primary | ICD-10-CM

## 2020-03-20 DIAGNOSIS — IMO0002 UNCONTROLLED DIABETES MELLITUS TYPE 2 WITH ATHEROSCLEROSIS OF ARTERIES OF EXTREMITIES: ICD-10-CM

## 2020-03-20 RX ORDER — INSULIN GLARGINE 100 [IU]/ML
INJECTION, SOLUTION SUBCUTANEOUS
Qty: 135 ML | Refills: 3 | Status: SHIPPED | OUTPATIENT
Start: 2020-03-20 | End: 2021-06-09 | Stop reason: SDUPTHER

## 2020-04-14 DIAGNOSIS — IMO0002 UNCONTROLLED DIABETES MELLITUS TYPE 2 WITH ATHEROSCLEROSIS OF ARTERIES OF EXTREMITIES: Primary | ICD-10-CM

## 2020-04-14 RX ORDER — INSULIN LISPRO 100 [IU]/ML
11 INJECTION, SOLUTION INTRAVENOUS; SUBCUTANEOUS
Qty: 5 PEN | Refills: 3 | Status: SHIPPED | OUTPATIENT
Start: 2020-04-14 | End: 2020-06-11 | Stop reason: SDUPTHER

## 2020-04-14 RX ORDER — FUROSEMIDE 40 MG/1
40 TABLET ORAL 2 TIMES DAILY
Qty: 60 TABLET | Refills: 3 | Status: SHIPPED | OUTPATIENT
Start: 2020-04-14 | End: 2020-06-29 | Stop reason: SDUPTHER

## 2020-06-11 DIAGNOSIS — IMO0002 UNCONTROLLED DIABETES MELLITUS TYPE 2 WITH ATHEROSCLEROSIS OF ARTERIES OF EXTREMITIES: ICD-10-CM

## 2020-06-11 RX ORDER — INSULIN LISPRO 100 [IU]/ML
11 INJECTION, SOLUTION INTRAVENOUS; SUBCUTANEOUS
Qty: 5 PEN | Refills: 3 | Status: SHIPPED | OUTPATIENT
Start: 2020-06-11 | End: 2020-10-29 | Stop reason: SDUPTHER

## 2020-06-29 DIAGNOSIS — R33.9 URINARY RETENTION: ICD-10-CM

## 2020-06-29 DIAGNOSIS — I50.22 CHRONIC SYSTOLIC CONGESTIVE HEART FAILURE (HCC): ICD-10-CM

## 2020-06-29 DIAGNOSIS — Z95.1 S/P CABG (CORONARY ARTERY BYPASS GRAFT): ICD-10-CM

## 2020-06-29 DIAGNOSIS — IMO0002 UNCONTROLLED DIABETES MELLITUS TYPE 2 WITH ATHEROSCLEROSIS OF ARTERIES OF EXTREMITIES: ICD-10-CM

## 2020-06-29 DIAGNOSIS — E66.01 CLASS 2 SEVERE OBESITY DUE TO EXCESS CALORIES WITH SERIOUS COMORBIDITY AND BODY MASS INDEX (BMI) OF 35.0 TO 35.9 IN ADULT (HCC): ICD-10-CM

## 2020-06-29 DIAGNOSIS — Z89.512 S/P BKA (BELOW KNEE AMPUTATION), LEFT (HCC): ICD-10-CM

## 2020-06-29 RX ORDER — ATORVASTATIN CALCIUM 80 MG/1
80 TABLET, FILM COATED ORAL DAILY
Qty: 30 TABLET | Refills: 1 | Status: SHIPPED | OUTPATIENT
Start: 2020-06-29 | End: 2020-11-30

## 2020-06-29 RX ORDER — PANTOPRAZOLE SODIUM 40 MG/1
40 TABLET, DELAYED RELEASE ORAL
Qty: 30 TABLET | Refills: 5 | Status: SHIPPED | OUTPATIENT
Start: 2020-06-29 | End: 2021-01-12 | Stop reason: SDUPTHER

## 2020-06-29 RX ORDER — METOCLOPRAMIDE 5 MG/1
5 TABLET ORAL DAILY
Qty: 30 TABLET | Refills: 5 | Status: SHIPPED | OUTPATIENT
Start: 2020-06-29 | End: 2021-01-12 | Stop reason: SDUPTHER

## 2020-06-29 RX ORDER — FINASTERIDE 5 MG/1
5 TABLET, FILM COATED ORAL DAILY
Qty: 30 TABLET | Refills: 5 | Status: SHIPPED | OUTPATIENT
Start: 2020-06-29 | End: 2021-01-12 | Stop reason: SDUPTHER

## 2020-06-29 RX ORDER — METOLAZONE 5 MG/1
5 TABLET ORAL 2 TIMES WEEKLY
Qty: 8 TABLET | Refills: 0 | Status: CANCELLED | OUTPATIENT
Start: 2020-06-29

## 2020-06-29 RX ORDER — TAMSULOSIN HYDROCHLORIDE 0.4 MG/1
0.4 CAPSULE ORAL
Qty: 30 CAPSULE | Refills: 5 | Status: SHIPPED | OUTPATIENT
Start: 2020-06-29 | End: 2021-06-09 | Stop reason: SDUPTHER

## 2020-06-29 RX ORDER — FERROUS SULFATE 325(65) MG
325 TABLET ORAL
Qty: 30 TABLET | Refills: 5 | Status: SHIPPED | OUTPATIENT
Start: 2020-06-29 | End: 2021-01-12 | Stop reason: SDUPTHER

## 2020-06-29 RX ORDER — FUROSEMIDE 40 MG/1
40 TABLET ORAL 2 TIMES DAILY
Qty: 60 TABLET | Refills: 3 | Status: SHIPPED | OUTPATIENT
Start: 2020-06-29 | End: 2021-04-26 | Stop reason: SDUPTHER

## 2020-06-29 RX ORDER — CLOPIDOGREL BISULFATE 75 MG/1
75 TABLET ORAL DAILY
Qty: 30 TABLET | Refills: 5 | Status: SHIPPED | OUTPATIENT
Start: 2020-06-29 | End: 2021-01-12 | Stop reason: SDUPTHER

## 2020-08-27 LAB
LEFT EYE DIABETIC RETINOPATHY: NORMAL
RIGHT EYE DIABETIC RETINOPATHY: NORMAL

## 2020-10-23 DIAGNOSIS — Z12.11 SCREENING FOR COLORECTAL CANCER: ICD-10-CM

## 2020-10-23 DIAGNOSIS — IMO0002 UNCONTROLLED DIABETES MELLITUS TYPE 2 WITH ATHEROSCLEROSIS OF ARTERIES OF EXTREMITIES: ICD-10-CM

## 2020-10-23 DIAGNOSIS — Z12.12 SCREENING FOR COLORECTAL CANCER: ICD-10-CM

## 2020-10-29 ENCOUNTER — OFFICE VISIT (OUTPATIENT)
Dept: FAMILY MEDICINE CLINIC | Facility: CLINIC | Age: 60
End: 2020-10-29
Payer: COMMERCIAL

## 2020-10-29 VITALS
TEMPERATURE: 98.8 F | DIASTOLIC BLOOD PRESSURE: 86 MMHG | BODY MASS INDEX: 36.98 KG/M2 | WEIGHT: 288 LBS | SYSTOLIC BLOOD PRESSURE: 130 MMHG

## 2020-10-29 DIAGNOSIS — L30.9 DERMATITIS: ICD-10-CM

## 2020-10-29 DIAGNOSIS — Z95.1 S/P CABG X 3: ICD-10-CM

## 2020-10-29 DIAGNOSIS — Z23 ENCOUNTER FOR IMMUNIZATION: ICD-10-CM

## 2020-10-29 DIAGNOSIS — R60.0 EDEMA OF RIGHT LOWER EXTREMITY: ICD-10-CM

## 2020-10-29 DIAGNOSIS — I74.3 EMBOLISM AND THROMBOSIS OF ARTERIES OF THE LOWER EXTREMITIES (HCC): ICD-10-CM

## 2020-10-29 DIAGNOSIS — E11.43 DIABETIC AUTONOMIC NEUROPATHY ASSOCIATED WITH TYPE 2 DIABETES MELLITUS (HCC): ICD-10-CM

## 2020-10-29 DIAGNOSIS — M86.172 OTHER ACUTE OSTEOMYELITIS, LEFT ANKLE AND FOOT (HCC): ICD-10-CM

## 2020-10-29 DIAGNOSIS — E66.01 CLASS 2 SEVERE OBESITY DUE TO EXCESS CALORIES WITH SERIOUS COMORBIDITY AND BODY MASS INDEX (BMI) OF 35.0 TO 35.9 IN ADULT (HCC): ICD-10-CM

## 2020-10-29 DIAGNOSIS — I73.9 PAD (PERIPHERAL ARTERY DISEASE) (HCC): ICD-10-CM

## 2020-10-29 DIAGNOSIS — E78.2 MIXED HYPERLIPIDEMIA: ICD-10-CM

## 2020-10-29 DIAGNOSIS — R14.0 ABDOMINAL DISTENSION: ICD-10-CM

## 2020-10-29 DIAGNOSIS — I50.33 ACUTE ON CHRONIC DIASTOLIC HEART FAILURE (HCC): Primary | ICD-10-CM

## 2020-10-29 DIAGNOSIS — I25.10 CORONARY ARTERY DISEASE INVOLVING NATIVE CORONARY ARTERY OF NATIVE HEART WITHOUT ANGINA PECTORIS: ICD-10-CM

## 2020-10-29 DIAGNOSIS — S88.112A AMPUTATED BELOW KNEE, LEFT (HCC): ICD-10-CM

## 2020-10-29 DIAGNOSIS — IMO0002 UNCONTROLLED DIABETES MELLITUS TYPE 2 WITH ATHEROSCLEROSIS OF ARTERIES OF EXTREMITIES: ICD-10-CM

## 2020-10-29 LAB — SL AMB POCT HEMOGLOBIN AIC: 8.6 (ref ?–6.5)

## 2020-10-29 PROCEDURE — 83036 HEMOGLOBIN GLYCOSYLATED A1C: CPT | Performed by: FAMILY MEDICINE

## 2020-10-29 PROCEDURE — 99214 OFFICE O/P EST MOD 30 MIN: CPT | Performed by: FAMILY MEDICINE

## 2020-10-29 PROCEDURE — G0008 ADMIN INFLUENZA VIRUS VAC: HCPCS

## 2020-10-29 PROCEDURE — 90682 RIV4 VACC RECOMBINANT DNA IM: CPT

## 2020-10-29 RX ORDER — POTASSIUM CHLORIDE 750 MG/1
10 TABLET, EXTENDED RELEASE ORAL DAILY
Qty: 30 TABLET | Refills: 2 | Status: SHIPPED | OUTPATIENT
Start: 2020-10-29 | End: 2021-06-09 | Stop reason: SDUPTHER

## 2020-10-29 RX ORDER — POTASSIUM CHLORIDE 20 MEQ/1
20 TABLET, EXTENDED RELEASE ORAL DAILY
Qty: 30 TABLET | Refills: 2 | Status: SHIPPED | OUTPATIENT
Start: 2020-10-29 | End: 2020-10-29 | Stop reason: SDUPTHER

## 2020-10-29 RX ORDER — BETAMETHASONE DIPROPIONATE 0.5 MG/G
CREAM TOPICAL 2 TIMES DAILY
Qty: 30 G | Refills: 5 | Status: SHIPPED | OUTPATIENT
Start: 2020-10-29 | End: 2021-08-29

## 2020-10-29 RX ORDER — METOLAZONE 5 MG/1
5 TABLET ORAL 2 TIMES WEEKLY
Qty: 8 TABLET | Refills: 3 | Status: SHIPPED | OUTPATIENT
Start: 2020-10-29 | End: 2021-08-29

## 2020-10-29 RX ORDER — INSULIN LISPRO 100 [IU]/ML
15 INJECTION, SOLUTION INTRAVENOUS; SUBCUTANEOUS
Qty: 15 PEN | Refills: 5 | Status: SHIPPED | OUTPATIENT
Start: 2020-10-29 | End: 2021-01-12 | Stop reason: SDUPTHER

## 2020-10-29 RX ORDER — METOLAZONE 5 MG/1
5 TABLET ORAL 2 TIMES WEEKLY
Qty: 8 TABLET | Refills: 3 | Status: SHIPPED | OUTPATIENT
Start: 2020-10-29 | End: 2020-10-29 | Stop reason: SDUPTHER

## 2020-11-30 DIAGNOSIS — I50.22 CHRONIC SYSTOLIC CONGESTIVE HEART FAILURE (HCC): ICD-10-CM

## 2020-11-30 RX ORDER — ATORVASTATIN CALCIUM 80 MG/1
TABLET, FILM COATED ORAL
Qty: 30 TABLET | Refills: 0 | Status: SHIPPED | OUTPATIENT
Start: 2020-11-30 | End: 2020-11-30 | Stop reason: SDUPTHER

## 2020-11-30 RX ORDER — ATORVASTATIN CALCIUM 80 MG/1
80 TABLET, FILM COATED ORAL DAILY
Qty: 30 TABLET | Refills: 0 | Status: SHIPPED | OUTPATIENT
Start: 2020-11-30 | End: 2020-12-29

## 2020-12-28 DIAGNOSIS — I50.22 CHRONIC SYSTOLIC CONGESTIVE HEART FAILURE (HCC): ICD-10-CM

## 2020-12-29 RX ORDER — ATORVASTATIN CALCIUM 80 MG/1
TABLET, FILM COATED ORAL
Qty: 30 TABLET | Refills: 0 | Status: SHIPPED | OUTPATIENT
Start: 2020-12-29 | End: 2021-06-09 | Stop reason: SDUPTHER

## 2021-01-12 DIAGNOSIS — Z95.1 S/P CABG (CORONARY ARTERY BYPASS GRAFT): ICD-10-CM

## 2021-01-12 DIAGNOSIS — Z89.512 S/P BKA (BELOW KNEE AMPUTATION), LEFT (HCC): ICD-10-CM

## 2021-01-12 DIAGNOSIS — IMO0002 UNCONTROLLED DIABETES MELLITUS TYPE 2 WITH ATHEROSCLEROSIS OF ARTERIES OF EXTREMITIES: ICD-10-CM

## 2021-01-12 DIAGNOSIS — R33.9 URINARY RETENTION: ICD-10-CM

## 2021-01-12 DIAGNOSIS — I50.22 CHRONIC SYSTOLIC CONGESTIVE HEART FAILURE (HCC): ICD-10-CM

## 2021-01-12 RX ORDER — INSULIN LISPRO 100 [IU]/ML
15 INJECTION, SOLUTION INTRAVENOUS; SUBCUTANEOUS
Qty: 15 PEN | Refills: 5 | Status: SHIPPED | OUTPATIENT
Start: 2021-01-12 | End: 2021-06-09 | Stop reason: SDUPTHER

## 2021-01-12 RX ORDER — PANTOPRAZOLE SODIUM 40 MG/1
40 TABLET, DELAYED RELEASE ORAL
Qty: 30 TABLET | Refills: 5 | Status: SHIPPED | OUTPATIENT
Start: 2021-01-12 | End: 2021-06-09 | Stop reason: SDUPTHER

## 2021-01-12 RX ORDER — CLOPIDOGREL BISULFATE 75 MG/1
75 TABLET ORAL DAILY
Qty: 30 TABLET | Refills: 5 | Status: SHIPPED | OUTPATIENT
Start: 2021-01-12 | End: 2021-06-09 | Stop reason: SDUPTHER

## 2021-01-12 RX ORDER — FINASTERIDE 5 MG/1
5 TABLET, FILM COATED ORAL DAILY
Qty: 30 TABLET | Refills: 5 | Status: SHIPPED | OUTPATIENT
Start: 2021-01-12 | End: 2021-06-09 | Stop reason: SDUPTHER

## 2021-01-12 RX ORDER — FERROUS SULFATE 325(65) MG
325 TABLET ORAL
Qty: 30 TABLET | Refills: 5 | Status: SHIPPED | OUTPATIENT
Start: 2021-01-12 | End: 2021-06-09 | Stop reason: SDUPTHER

## 2021-01-12 RX ORDER — METOCLOPRAMIDE 5 MG/1
5 TABLET ORAL DAILY
Qty: 30 TABLET | Refills: 5 | Status: SHIPPED | OUTPATIENT
Start: 2021-01-12 | End: 2021-06-09 | Stop reason: SDUPTHER

## 2021-02-11 NOTE — SOCIAL WORK
CM was asked to talk to patient about his insurance  Patient states he got a letter from his insurance company that they are denying coverage for this hospital stay  CM explained our department does not handle insurance billing issues  CM encouraged patient to contact his insurance company to handle this issue  CM also provided name and number of our Financial counselor should he need any assistance with his bill       Wendy Tomas RN Surgeon: Kiera Hope MD

## 2021-03-30 ENCOUNTER — IMMUNIZATIONS (OUTPATIENT)
Dept: FAMILY MEDICINE CLINIC | Facility: HOSPITAL | Age: 61
End: 2021-03-30

## 2021-03-30 DIAGNOSIS — Z23 ENCOUNTER FOR IMMUNIZATION: Primary | ICD-10-CM

## 2021-03-30 PROCEDURE — 0011A SARS-COV-2 / COVID-19 MRNA VACCINE (MODERNA) 100 MCG: CPT

## 2021-03-30 PROCEDURE — 91301 SARS-COV-2 / COVID-19 MRNA VACCINE (MODERNA) 100 MCG: CPT

## 2021-04-26 DIAGNOSIS — IMO0002 UNCONTROLLED DIABETES MELLITUS TYPE 2 WITH ATHEROSCLEROSIS OF ARTERIES OF EXTREMITIES: ICD-10-CM

## 2021-04-26 RX ORDER — FUROSEMIDE 40 MG/1
40 TABLET ORAL 2 TIMES DAILY
Qty: 60 TABLET | Refills: 0 | Status: SHIPPED | OUTPATIENT
Start: 2021-04-26 | End: 2021-06-09 | Stop reason: SDUPTHER

## 2021-04-26 NOTE — TELEPHONE ENCOUNTER
Patient was notified about his medication refill  He was also told that he needs to do a follow up appt within the next 30 days  He said that he will call back to schedule his appt because has to set up a ride for the appt

## 2021-04-30 ENCOUNTER — IMMUNIZATIONS (OUTPATIENT)
Dept: FAMILY MEDICINE CLINIC | Facility: HOSPITAL | Age: 61
End: 2021-04-30

## 2021-04-30 DIAGNOSIS — Z23 ENCOUNTER FOR IMMUNIZATION: Primary | ICD-10-CM

## 2021-04-30 PROCEDURE — 0012A SARS-COV-2 / COVID-19 MRNA VACCINE (MODERNA) 100 MCG: CPT

## 2021-04-30 PROCEDURE — 91301 SARS-COV-2 / COVID-19 MRNA VACCINE (MODERNA) 100 MCG: CPT

## 2021-06-09 ENCOUNTER — OFFICE VISIT (OUTPATIENT)
Dept: FAMILY MEDICINE CLINIC | Facility: CLINIC | Age: 61
End: 2021-06-09
Payer: COMMERCIAL

## 2021-06-09 VITALS — BODY MASS INDEX: 35.95 KG/M2 | WEIGHT: 280 LBS | DIASTOLIC BLOOD PRESSURE: 70 MMHG | SYSTOLIC BLOOD PRESSURE: 120 MMHG

## 2021-06-09 DIAGNOSIS — Z95.1 S/P CABG (CORONARY ARTERY BYPASS GRAFT): ICD-10-CM

## 2021-06-09 DIAGNOSIS — M86.172 OTHER ACUTE OSTEOMYELITIS, LEFT ANKLE AND FOOT (HCC): ICD-10-CM

## 2021-06-09 DIAGNOSIS — R33.9 URINARY RETENTION: ICD-10-CM

## 2021-06-09 DIAGNOSIS — I74.3 EMBOLISM AND THROMBOSIS OF ARTERIES OF THE LOWER EXTREMITIES (HCC): ICD-10-CM

## 2021-06-09 DIAGNOSIS — E66.01 CLASS 2 SEVERE OBESITY DUE TO EXCESS CALORIES WITH SERIOUS COMORBIDITY AND BODY MASS INDEX (BMI) OF 35.0 TO 35.9 IN ADULT (HCC): ICD-10-CM

## 2021-06-09 DIAGNOSIS — Z89.512 S/P BKA (BELOW KNEE AMPUTATION), LEFT (HCC): ICD-10-CM

## 2021-06-09 DIAGNOSIS — IMO0002 UNCONTROLLED DIABETES MELLITUS TYPE 2 WITH ATHEROSCLEROSIS OF ARTERIES OF EXTREMITIES: ICD-10-CM

## 2021-06-09 DIAGNOSIS — I50.22 CHRONIC SYSTOLIC CONGESTIVE HEART FAILURE (HCC): ICD-10-CM

## 2021-06-09 PROCEDURE — 99214 OFFICE O/P EST MOD 30 MIN: CPT | Performed by: FAMILY MEDICINE

## 2021-06-09 RX ORDER — INSULIN GLARGINE 100 [IU]/ML
80 INJECTION, SOLUTION SUBCUTANEOUS EVERY 12 HOURS SCHEDULED
Qty: 135 ML | Refills: 3 | Status: SHIPPED | OUTPATIENT
Start: 2021-06-09 | End: 2022-01-10 | Stop reason: SDUPTHER

## 2021-06-09 RX ORDER — FERROUS SULFATE 325(65) MG
325 TABLET ORAL
Qty: 90 TABLET | Refills: 1 | Status: SHIPPED | OUTPATIENT
Start: 2021-06-09 | End: 2021-08-29

## 2021-06-09 RX ORDER — FINASTERIDE 5 MG/1
5 TABLET, FILM COATED ORAL DAILY
Qty: 90 TABLET | Refills: 1 | Status: SHIPPED | OUTPATIENT
Start: 2021-06-09 | End: 2021-09-20

## 2021-06-09 RX ORDER — FUROSEMIDE 40 MG/1
40 TABLET ORAL 2 TIMES DAILY
Qty: 180 TABLET | Refills: 1 | Status: SHIPPED | OUTPATIENT
Start: 2021-06-09 | End: 2021-09-20

## 2021-06-09 RX ORDER — METOLAZONE 5 MG/1
5 TABLET ORAL 2 TIMES WEEKLY
Qty: 8 TABLET | Refills: 3 | Status: CANCELLED | OUTPATIENT
Start: 2021-06-10

## 2021-06-09 RX ORDER — INSULIN LISPRO 100 [IU]/ML
15 INJECTION, SOLUTION INTRAVENOUS; SUBCUTANEOUS
Qty: 3 ML | Refills: 15 | Status: SHIPPED | OUTPATIENT
Start: 2021-06-09 | End: 2021-08-18

## 2021-06-09 RX ORDER — CLOPIDOGREL BISULFATE 75 MG/1
75 TABLET ORAL DAILY
Qty: 90 TABLET | Refills: 1 | Status: SHIPPED | OUTPATIENT
Start: 2021-06-09 | End: 2021-09-20

## 2021-06-09 RX ORDER — PANTOPRAZOLE SODIUM 40 MG/1
40 TABLET, DELAYED RELEASE ORAL
Qty: 90 TABLET | Refills: 1 | Status: SHIPPED | OUTPATIENT
Start: 2021-06-09 | End: 2021-09-20

## 2021-06-09 RX ORDER — ATORVASTATIN CALCIUM 80 MG/1
80 TABLET, FILM COATED ORAL DAILY
Qty: 90 TABLET | Refills: 1 | Status: SHIPPED | OUTPATIENT
Start: 2021-06-09 | End: 2021-09-20

## 2021-06-09 RX ORDER — METOCLOPRAMIDE 5 MG/1
5 TABLET ORAL DAILY
Qty: 90 TABLET | Refills: 1 | Status: SHIPPED | OUTPATIENT
Start: 2021-06-09 | End: 2021-09-20

## 2021-06-09 RX ORDER — POTASSIUM CHLORIDE 750 MG/1
10 TABLET, EXTENDED RELEASE ORAL DAILY
Qty: 90 TABLET | Refills: 1 | Status: SHIPPED | OUTPATIENT
Start: 2021-06-09 | End: 2021-08-29

## 2021-06-09 RX ORDER — TAMSULOSIN HYDROCHLORIDE 0.4 MG/1
0.4 CAPSULE ORAL
Qty: 90 CAPSULE | Refills: 1 | Status: SHIPPED | OUTPATIENT
Start: 2021-06-09 | End: 2021-09-20

## 2021-06-09 NOTE — PROGRESS NOTES
Assessment/Plan:    69-year-old male with: CHF, type 2 diabetes status post BKA on the left for osteomyelitis, CAD status post CABG, urinary retention, obesity, history of arterial embolus  Discussed workup and treatment options at length with risks and benefits will continue current medications encouraged lower extremity compression patient declines at this time discussed supportive care return parameters follow-up in 6 months and encouraged follow-up with his specialists including Cardiology    No problem-specific Assessment & Plan notes found for this encounter  Diagnoses and all orders for this visit:    Chronic systolic congestive heart failure (HCC)  -     atorvastatin (LIPITOR) 80 mg tablet; Take 1 tablet (80 mg total) by mouth daily  -     ferrous sulfate 325 (65 Fe) mg tablet; Take 1 tablet (325 mg total) by mouth daily with breakfast  -     metoclopramide (REGLAN) 5 mg tablet; Take 1 tablet (5 mg total) by mouth daily    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 ([facility])  -     Lantus SoloStar 100 units/mL injection pen; Inject 80 Units under the skin every 12 (twelve) hours  -     furosemide (LASIX) 40 mg tablet; Take 1 tablet (40 mg total) by mouth 2 (two) times a day  -     insulin lispro (HumaLOG KwikPen) 100 units/mL injection pen; Inject 15 Units under the skin 3 (three) times a day with meals    S/P CABG (coronary artery bypass graft)  -     clopidogrel (PLAVIX) 75 mg tablet; Take 1 tablet (75 mg total) by mouth daily  -     tamsulosin (FLOMAX) 0 4 mg; Take 1 capsule (0 4 mg total) by mouth daily with dinner    Urinary retention  -     finasteride (PROSCAR) 5 mg tablet; Take 1 tablet (5 mg total) by mouth daily    Class 2 severe obesity due to excess calories with serious comorbidity and body mass index (BMI) of 35 0 to 35 9 in adult ([facility])  -     potassium chloride (K-DUR,KLOR-CON) 10 mEq tablet;  Take 1 tablet (10 mEq total) by mouth daily    S/P BKA (below knee amputation), left (HCC)  -     pantoprazole (PROTONIX) 40 mg tablet; Take 1 tablet (40 mg total) by mouth daily in the early morning    Other acute osteomyelitis, left ankle and foot (HCC)    Embolism and thrombosis of arteries of the lower extremities (HCC)    Other orders  -     Cancel: metolazone (ZAROXOLYN) 5 mg tablet; Take 1 tablet (5 mg total) by mouth 2 (two) times a week          Subjective:     Chief Complaint   Patient presents with    Medication Refill     follow up to medications and refill         Patient ID: Reji Resendiz is a 63 y o  male  Patient is a 19-year-old male who presents for follow-up on CHF, type 2 diabetes status post BKA on the left for osteomyelitis, CAD status post CABG, urinary retention, obesity, history of arterial embolus  Patient admits being generally stable on medications he admits his right leg is swollen no fevers chills nausea vomiting tolerating p o  Intake      The following portions of the patient's history were reviewed and updated as appropriate: allergies, current medications, past family history, past medical history, past social history, past surgical history and problem list     Review of Systems   Constitutional: Positive for fatigue  HENT: Negative  Eyes: Negative  Respiratory: Positive for shortness of breath  Cardiovascular: Negative  Gastrointestinal: Negative  Endocrine: Negative  Genitourinary: Negative  Musculoskeletal: Negative  Allergic/Immunologic: Negative  Neurological: Negative  Hematological: Negative  Psychiatric/Behavioral: Negative  All other systems reviewed and are negative  Objective:      /70 (BP Location: Right arm, Patient Position: Sitting, Cuff Size: Large)   Wt 127 kg (280 lb)   BMI 35 95 kg/m²          Physical Exam  Constitutional:       Appearance: He is well-developed  HENT:      Head: Atraumatic        Right Ear: External ear normal       Left Ear: External ear normal    Eyes:      Conjunctiva/sclera: Conjunctivae normal       Pupils: Pupils are equal, round, and reactive to light  Neck:      Musculoskeletal: Normal range of motion  Cardiovascular:      Rate and Rhythm: Normal rate and regular rhythm  Heart sounds: Normal heart sounds  Pulmonary:      Effort: Pulmonary effort is normal  No respiratory distress  Breath sounds: Normal breath sounds  Abdominal:      General: There is no distension  Palpations: Abdomen is soft  Tenderness: There is no abdominal tenderness  There is no guarding or rebound  Musculoskeletal: Normal range of motion  Skin:     General: Skin is warm and dry  Neurological:      Mental Status: He is alert and oriented to person, place, and time  Cranial Nerves: No cranial nerve deficit  Psychiatric:         Behavior: Behavior normal          Thought Content: Thought content normal          Judgment: Judgment normal          BMI Counseling: BMI was not able to be calculated due to patient refusing height and/or weight

## 2021-08-18 DIAGNOSIS — IMO0002 UNCONTROLLED DIABETES MELLITUS TYPE 2 WITH ATHEROSCLEROSIS OF ARTERIES OF EXTREMITIES: ICD-10-CM

## 2021-08-18 RX ORDER — INSULIN LISPRO 100 [IU]/ML
INJECTION, SOLUTION INTRAVENOUS; SUBCUTANEOUS
Qty: 45 ML | Refills: 3 | Status: SHIPPED | OUTPATIENT
Start: 2021-08-18 | End: 2022-07-18

## 2021-08-29 ENCOUNTER — HOSPITAL ENCOUNTER (EMERGENCY)
Facility: HOSPITAL | Age: 61
Discharge: HOME | End: 2021-08-29
Attending: EMERGENCY MEDICINE | Admitting: EMERGENCY MEDICINE
Payer: COMMERCIAL

## 2021-08-29 ENCOUNTER — APPOINTMENT (EMERGENCY)
Dept: RADIOLOGY | Facility: HOSPITAL | Age: 61
End: 2021-08-29
Payer: COMMERCIAL

## 2021-08-29 VITALS
HEART RATE: 84 BPM | SYSTOLIC BLOOD PRESSURE: 154 MMHG | TEMPERATURE: 98.3 F | RESPIRATION RATE: 16 BRPM | OXYGEN SATURATION: 100 % | DIASTOLIC BLOOD PRESSURE: 72 MMHG

## 2021-08-29 DIAGNOSIS — R60.0 EDEMA OF RIGHT LOWER EXTREMITY: ICD-10-CM

## 2021-08-29 DIAGNOSIS — R06.00 DYSPNEA ON EXERTION: ICD-10-CM

## 2021-08-29 DIAGNOSIS — S81.801A WOUND OF RIGHT LOWER EXTREMITY, INITIAL ENCOUNTER: Primary | ICD-10-CM

## 2021-08-29 DIAGNOSIS — I73.9 PAD (PERIPHERAL ARTERY DISEASE) (HCC): ICD-10-CM

## 2021-08-29 LAB
ALBUMIN SERPL BCP-MCNC: 2.9 G/DL (ref 3.5–5)
ALP SERPL-CCNC: 63 U/L (ref 46–116)
ALT SERPL W P-5'-P-CCNC: 35 U/L (ref 12–78)
ANION GAP SERPL CALCULATED.3IONS-SCNC: 6 MMOL/L (ref 4–13)
APTT PPP: 27 SECONDS (ref 23–37)
AST SERPL W P-5'-P-CCNC: 25 U/L (ref 5–45)
BASOPHILS # BLD AUTO: 0.05 THOUSANDS/ΜL (ref 0–0.1)
BASOPHILS NFR BLD AUTO: 1 % (ref 0–1)
BILIRUB SERPL-MCNC: 0.32 MG/DL (ref 0.2–1)
BUN SERPL-MCNC: 19 MG/DL (ref 5–25)
CALCIUM ALBUM COR SERPL-MCNC: 9.8 MG/DL (ref 8.3–10.1)
CALCIUM SERPL-MCNC: 8.9 MG/DL (ref 8.3–10.1)
CHLORIDE SERPL-SCNC: 98 MMOL/L (ref 100–108)
CO2 SERPL-SCNC: 30 MMOL/L (ref 21–32)
CREAT SERPL-MCNC: 1.21 MG/DL (ref 0.6–1.3)
EOSINOPHIL # BLD AUTO: 0.25 THOUSAND/ΜL (ref 0–0.61)
EOSINOPHIL NFR BLD AUTO: 3 % (ref 0–6)
ERYTHROCYTE [DISTWIDTH] IN BLOOD BY AUTOMATED COUNT: 13.8 % (ref 11.6–15.1)
GFR SERPL CREATININE-BSD FRML MDRD: 64 ML/MIN/1.73SQ M
GLUCOSE SERPL-MCNC: 253 MG/DL (ref 65–140)
HCT VFR BLD AUTO: 41.2 % (ref 36.5–49.3)
HGB BLD-MCNC: 12.9 G/DL (ref 12–17)
IMM GRANULOCYTES # BLD AUTO: 0.02 THOUSAND/UL (ref 0–0.2)
IMM GRANULOCYTES NFR BLD AUTO: 0 % (ref 0–2)
INR PPP: 0.99 (ref 0.84–1.19)
LACTATE SERPL-SCNC: 1.6 MMOL/L (ref 0.5–2)
LACTATE SERPL-SCNC: 2.4 MMOL/L (ref 0.5–2)
LYMPHOCYTES # BLD AUTO: 1.33 THOUSANDS/ΜL (ref 0.6–4.47)
LYMPHOCYTES NFR BLD AUTO: 14 % (ref 14–44)
MCH RBC QN AUTO: 27.6 PG (ref 26.8–34.3)
MCHC RBC AUTO-ENTMCNC: 31.3 G/DL (ref 31.4–37.4)
MCV RBC AUTO: 88 FL (ref 82–98)
MONOCYTES # BLD AUTO: 0.58 THOUSAND/ΜL (ref 0.17–1.22)
MONOCYTES NFR BLD AUTO: 6 % (ref 4–12)
NEUTROPHILS # BLD AUTO: 7 THOUSANDS/ΜL (ref 1.85–7.62)
NEUTS SEG NFR BLD AUTO: 76 % (ref 43–75)
NRBC BLD AUTO-RTO: 0 /100 WBCS
NT-PROBNP SERPL-MCNC: 156 PG/ML
PLATELET # BLD AUTO: 259 THOUSANDS/UL (ref 149–390)
PMV BLD AUTO: 9.9 FL (ref 8.9–12.7)
POTASSIUM SERPL-SCNC: 4.3 MMOL/L (ref 3.5–5.3)
PROT SERPL-MCNC: 7.8 G/DL (ref 6.4–8.2)
PROTHROMBIN TIME: 12.9 SECONDS (ref 11.6–14.5)
RBC # BLD AUTO: 4.68 MILLION/UL (ref 3.88–5.62)
SODIUM SERPL-SCNC: 134 MMOL/L (ref 136–145)
TROPONIN I SERPL-MCNC: <0.02 NG/ML
WBC # BLD AUTO: 9.23 THOUSAND/UL (ref 4.31–10.16)

## 2021-08-29 PROCEDURE — 99285 EMERGENCY DEPT VISIT HI MDM: CPT | Performed by: PHYSICIAN ASSISTANT

## 2021-08-29 PROCEDURE — 85730 THROMBOPLASTIN TIME PARTIAL: CPT | Performed by: PHYSICIAN ASSISTANT

## 2021-08-29 PROCEDURE — 85610 PROTHROMBIN TIME: CPT | Performed by: PHYSICIAN ASSISTANT

## 2021-08-29 PROCEDURE — 85025 COMPLETE CBC W/AUTO DIFF WBC: CPT | Performed by: PHYSICIAN ASSISTANT

## 2021-08-29 PROCEDURE — 71045 X-RAY EXAM CHEST 1 VIEW: CPT

## 2021-08-29 PROCEDURE — 99284 EMERGENCY DEPT VISIT MOD MDM: CPT

## 2021-08-29 PROCEDURE — 83880 ASSAY OF NATRIURETIC PEPTIDE: CPT | Performed by: PHYSICIAN ASSISTANT

## 2021-08-29 PROCEDURE — 36415 COLL VENOUS BLD VENIPUNCTURE: CPT | Performed by: PHYSICIAN ASSISTANT

## 2021-08-29 PROCEDURE — 93005 ELECTROCARDIOGRAM TRACING: CPT

## 2021-08-29 PROCEDURE — NC001 PR NO CHARGE: Performed by: PODIATRIST

## 2021-08-29 PROCEDURE — 87040 BLOOD CULTURE FOR BACTERIA: CPT | Performed by: PHYSICIAN ASSISTANT

## 2021-08-29 PROCEDURE — 84484 ASSAY OF TROPONIN QUANT: CPT | Performed by: PHYSICIAN ASSISTANT

## 2021-08-29 PROCEDURE — 80053 COMPREHEN METABOLIC PANEL: CPT | Performed by: PHYSICIAN ASSISTANT

## 2021-08-29 PROCEDURE — 83605 ASSAY OF LACTIC ACID: CPT | Performed by: PHYSICIAN ASSISTANT

## 2021-08-29 RX ORDER — CEPHALEXIN 500 MG/1
500 CAPSULE ORAL EVERY 6 HOURS SCHEDULED
Qty: 28 CAPSULE | Refills: 0 | Status: SHIPPED | OUTPATIENT
Start: 2021-08-29 | End: 2021-09-05

## 2021-08-29 RX ORDER — MELATONIN
1000 DAILY
COMMUNITY
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
## 2021-08-29 NOTE — CONSULTS
Podiatry - Consultation    Patient Information:   Magui Hoang 57 y o  male MRN: [id number]  Unit/Bed#: ED 05 Encounter: [id number]  PCP: Chery Conte DO  Date of Admission:  8/29/2021  Date of Consultation: 08/29/21  Requesting Physician: Jordan Wood*      ASSESSMENT:    Magui Hoang is a 57 y o  male with:    1  R leg ulcer with cellulitis  2  T2DM (HbA1c 10/29/2020 8 6)  3  R leg edema    PLAN:    · Recommend to follow up at wound care clinic for continued management of RLE wound  · Local wound care consisting of Adaptic, Maxorb, ABD, and DSD  Wound care instructions explained to patient and wife  Recommend compression stocking for daily compression  · Oral antibiotics recommended upon discharge  · Elevation on pillows when non-ambulatory  · Rest of care per primary team   · Will discuss this plan with my attending and update as needed  Weightbearing status: as tolerated    SUBJECTIVE:    History of Present Illness:    Magui Hoang is a 57 y o  male who is originally admitted 8/29/2021 due to right leg wound  Patient has a past medical history of DMT2, 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, swelling of wounds and neuropathy, s/p L BKA, PAD, CHF  We are consulted for RLE wound  Patient  states that his leg drained a lot consistently and that has been going on for 1 year  The did not realize there was open skin on his legs until a few days ago  He reports that he sits around all day at home and does not elevate his leg he does not use any compression on his leg  He reports having a left below-knee amputation around 2017 secondary to infection that started as a wound  Patient is concerned that wound on right leg may turn into same problem that he had on left leg  He reports he has not gone to the [address] [place] [address] Road because of [employer]square  He denies dressing changes and reports in frequent cleaning of leg    Patient reports the leg is normally dry scaly skin with some redness and a lot of drainage however it is not usually an open wound as it appears today  He denies nausea, vomiting, fever, chills, chest pain or shortness of breath at this time  Review of Systems:    Constitutional: Negative  HENT: Negative  Eyes: Negative  Respiratory: Negative  Cardiovascular: b/l leg swelling  Gastrointestinal: Negative  Musculoskeletal: L BKA, R   Skin:R leg wound, extremely dry and cracked skin, severe edema, mild erythema  Neurological: numbness RLE  Psych: Negative       Past Medical and Surgical History:     Past Medical History:   Diagnosis Date    Anxiety and depression     Cataract     Diabetic autonomic neuropathy associated with type 2 diabetes mellitus ([address] )     Last Assessed: 2017    History of DVT (deep vein thrombosis)     left LE    Hyperlipidemia     Orthostatic hypotension        Past Surgical History:   Procedure Laterality Date    LEG AMPUTATION THROUGH LOWER TIBIA AND FIBULA Left 8/3/2018    Procedure: AMPUTATION BELOW KNEE (BKA) L BKA;  Surgeon: Patricia Ospina MD;  [facility];  Service: Vascular    [place] CABG, ARTERY-VEIN, FOUR N/A 3/28/2018    Procedure: CORONARY ARTERY BYPASS GRAFT (CABG) x3 VESSELS, LIMA TO LAD, SVG--> PDA, SVG--> OM2,  RIGHT LEG EVH/SVH TO , INTRA-OP AMANDEEP;  Surgeon: Nhan Black MD;  [facility];  Service: Cardiac Surgery    ROTATOR CUFF REPAIR Bilateral        Meds/Allergies:    (Not in a hospital admission)      Allergies   Allergen Reactions    Metformin      Allergy listed on nursing home paperwork       Social History:     Marital Status: /Civil Union    Substance Use History:   Social History     Substance and Sexual Activity   Alcohol Use No     Social History     Tobacco Use   Smoking Status Former Smoker    Packs/day: 1 00    Years: 12 00    Pack years: 12 00    Types: Cigarettes    Quit date: 3/23/1994    Years since quittin 4   Smokeless Tobacco Never Used     Social History     Substance and Sexual Activity   Drug Use No       Family History:    Family History   Problem Relation Age of Onset    Atrial fibrillation Mother     Stroke Mother     Hypertension Father     Heart attack Paternal Grandfather 61         OBJECTIVE:    Vitals:   Blood Pressure: 148/65 (08/29/21 1245)  Pulse: 83 (08/29/21 1245)  Temperature: 98 3 °F (36 8 °C) (08/29/21 1110)  Temp Source: Oral (08/29/21 1110)  Respirations: 18 (08/29/21 1245)  SpO2: 94 % (08/29/21 1245)    Physical Exam:    General Appearance: Alert, cooperative, no distress  HEENT: Head normocephalic, atraumatic, without obvious abnormality  Heart: Normal rate and rhythm  Lungs: Non-labored breathing  No respiratory distress  Abdomen: Without distension  Psychiatric: AAOx3  Lower Extremity:  Vascular:   Right DP and PT pulses are biphasic by Doppler  CRT < 3 seconds at the digits  +4/4 edema noted at R lower extremities  Pedal hair is absent  Skin temperature is WNL bilaterally  Musculoskeletal:  MMT is 4/5 in all muscle compartments RLE  ROM at the 1st MPJ and ankle joint are limited on R with the leg extended  No Pain on palpation of RLE  L healed BKA noted    Dermatological:  Lower extremity wound(s) as noted below:    Wound #: 1  Location: R anterior leg  Length 16cm: Width 10cm: Depth 0cm  Deepest Tissue Noted in Base: subq  Probe to Bone: No  Peripheral Skin Description: Attached  Granulation: 40% Fibrotic Tissue: 60% Necrotic Tissue: 0%   Drainage Amount: copious, serous  Signs of Infection: Yes, mild erythema, odor, edema      Neurological:  Gross sensation is diminished  Protective sensation is absent  Patient Reports numbness and/or paresthesias      Clinical Images 08/29/21:              RLE    Additional data:     Lab Results: I have personally reviewed pertinent labs including:    Results from last 7 days   Lab Units 08/29/21  1154   WBC Thousand/uL 9 23   HEMOGLOBIN g/dL 12 9   HEMATOCRIT % 41 2   PLATELETS Thousands/uL 259   NEUTROS PCT % 76*   LYMPHS PCT % 14 MONOS PCT % 6   EOS PCT % 3     Results from last 7 days   Lab Units 08/29/21  1154   POTASSIUM mmol/L 4 3   CHLORIDE mmol/L 98*   CO2 mmol/L 30   BUN mg/dL 19   CREATININE mg/dL 1 21   CALCIUM mg/dL 8 9   ALK PHOS U/L 63   ALT U/L 35   AST U/L 25     Results from last 7 days   Lab Units 08/29/21  1154   INR  0 99       Cultures: I have personally reviewed pertinent cultures including:              Imaging: I have personally reviewed pertinent reports in PACS  EKG, Pathology, and Other Studies: I have personally reviewed pertinent reports  ** Please Note: Portions of the record may have been created with voice recognition software  Occasional wrong word or "sound a like" substitutions may have occurred due to the inherent limitations of voice recognition software  Read the chart carefully and recognize, using context, where substitutions have occurred   **

## 2021-08-30 ENCOUNTER — TELEPHONE (OUTPATIENT)
Dept: FAMILY MEDICINE CLINIC | Facility: CLINIC | Age: 61
End: 2021-08-30

## 2021-08-30 LAB
ATRIAL RATE: 86 BPM
P AXIS: 52 DEGREES
PR INTERVAL: 160 MS
QRS AXIS: -26 DEGREES
QRSD INTERVAL: 90 MS
QT INTERVAL: 352 MS
QTC INTERVAL: 421 MS
T WAVE AXIS: 98 DEGREES
VENTRICULAR RATE: 86 BPM

## 2021-08-30 PROCEDURE — 93010 ELECTROCARDIOGRAM REPORT: CPT | Performed by: INTERNAL MEDICINE

## 2021-08-30 NOTE — TELEPHONE ENCOUNTER
Dr Marcos Palacios,     Patient called in and said he needs a visiting nurse come to his home and change his dressings   Please advise

## 2021-08-31 ENCOUNTER — TELEPHONE (OUTPATIENT)
Dept: FAMILY MEDICINE CLINIC | Facility: CLINIC | Age: 61
End: 2021-08-31

## 2021-08-31 NOTE — TELEPHONE ENCOUNTER
Pt was seen in the ED  They have pictures of his leg  I am going to try to get him into wound care as soon as I can  Due to his amputation, missy has a hard time getting around  I am not sure he needs a visiting nurse as much as wound care

## 2021-09-03 LAB
BACTERIA BLD CULT: NORMAL
BACTERIA BLD CULT: NORMAL

## 2021-09-09 ENCOUNTER — OFFICE VISIT (OUTPATIENT)
Dept: WOUND CARE | Facility: HOSPITAL | Age: 61
End: 2021-09-09
Payer: COMMERCIAL

## 2021-09-09 VITALS
HEIGHT: 73 IN | SYSTOLIC BLOOD PRESSURE: 112 MMHG | BODY MASS INDEX: 37.11 KG/M2 | HEART RATE: 76 BPM | DIASTOLIC BLOOD PRESSURE: 64 MMHG | WEIGHT: 280 LBS | RESPIRATION RATE: 18 BRPM | TEMPERATURE: 99.3 F

## 2021-09-09 DIAGNOSIS — I83.019 VENOUS ULCER OF RIGHT LEG (HCC): Primary | ICD-10-CM

## 2021-09-09 DIAGNOSIS — L97.919 VENOUS ULCER OF RIGHT LEG (HCC): Primary | ICD-10-CM

## 2021-09-09 DIAGNOSIS — Z89.512 S/P BKA (BELOW KNEE AMPUTATION), LEFT (HCC): ICD-10-CM

## 2021-09-09 DIAGNOSIS — E66.01 CLASS 2 SEVERE OBESITY DUE TO EXCESS CALORIES WITH SERIOUS COMORBIDITY AND BODY MASS INDEX (BMI) OF 35.0 TO 35.9 IN ADULT (HCC): ICD-10-CM

## 2021-09-09 DIAGNOSIS — I89.0 LYMPHEDEMA OF RIGHT LOWER EXTREMITY: ICD-10-CM

## 2021-09-09 DIAGNOSIS — I73.9 PAD (PERIPHERAL ARTERY DISEASE) (HCC): ICD-10-CM

## 2021-09-09 PROCEDURE — 99213 OFFICE O/P EST LOW 20 MIN: CPT | Performed by: FAMILY MEDICINE

## 2021-09-09 PROCEDURE — 29581 APPL MULTLAYER CMPRN SYS LEG: CPT

## 2021-09-09 PROCEDURE — 99214 OFFICE O/P EST MOD 30 MIN: CPT | Performed by: FAMILY MEDICINE

## 2021-09-09 RX ORDER — AMMONIUM LACTATE 12 G/100G
LOTION TOPICAL
Qty: 400 G | Refills: 1 | Status: SHIPPED | OUTPATIENT
Start: 2021-09-09

## 2021-09-09 RX ORDER — LIDOCAINE HYDROCHLORIDE 40 MG/ML
5 SOLUTION TOPICAL ONCE
Status: COMPLETED | OUTPATIENT
Start: 2021-09-09 | End: 2021-09-09

## 2021-09-09 RX ORDER — AMMONIUM LACTATE 12 G/100G
LOTION TOPICAL
Qty: 400 G | Refills: 1 | Status: SHIPPED | OUTPATIENT
Start: 2021-09-09 | End: 2021-09-09

## 2021-09-09 RX ADMIN — LIDOCAINE HYDROCHLORIDE 5 ML: 40 SOLUTION TOPICAL at 15:58

## 2021-09-09 NOTE — PATIENT INSTRUCTIONS
Orders Placed This Encounter   Procedures    Wound cleansing and dressings     Right lower leg:  Wash your hands with soap and water  Remove old dressing, discard into plastic bag and place in trash  Cleanse the wound with mild soap (such as Dove) and water and rinse well with clear water prior to applying a clean dressing  Do not use tissue or cotton balls  Do not scrub the wound  Pat dry using gauze    Shower yes   Lac hydrin to skin on right lower leg  Please cleanse RLE with soap and water from base of toes to knee with each dressing change  Apply calcium alginate to wound bed   Cover with ABD  Secure with Co-Flex lite  Change dressing twice per week (Sunday and Thursday)    Elevate your right leg as much as possible as high as possible during the day when sitting and at night    Consider taking blood glucose regularly at home to ensure glucose control  3-4 servings protein in diet daily    Begin visiting nurses for dressing changes 2 x per week on Sundays and Thursdays, skipping the day the patient comes to the wound center    Follow up in 1 week Dr Thiago Trejo wound treatment note:  Cleansed with normal saline  Redressed as ordered above     Standing Status:   Future     Standing Expiration Date:   9/9/2022

## 2021-09-09 NOTE — PROGRESS NOTES
Patient ID: Florian Amador is a 57 y o  male Date of Birth 1960       Chief Complaint   Patient presents with    Follow Up Wound Care Visit     Initial visit for former patient who presents today with weeping venous ulcer of the RLE which he has had for approx 2-3 weeks with accompanying LE edema  Allergies:  Metformin    Diagnosis:      Diagnosis ICD-10-CM Associated Orders   1  Venous ulcer of right leg ([facility])  I83 019 lidocaine (XYLOCAINE) 4 % topical solution 5 mL    L97 919 Wound cleansing and dressings     Cast Application     Ambulatory Referral to Home Health   2  Lymphedema of right lower extremity  Z59 6 Cast Application     Ambulatory Referral to Home Health     ammonium lactate (LAC-HYDRIN) 12 % lotion   3  S/P BKA (below knee amputation), left ([facility])  Z89 512 VAS lower limb arterial duplex, limited/unilateral   4  Class 2 severe obesity due to excess calories with serious comorbidity and body mass index (BMI) of 35 0 to 35 9 in adult ([facility])  E66 01     Z68 35    5  PAD (peripheral artery disease) ([facility])  I73 9 VAS lower limb arterial duplex, limited/unilateral           Assessment & Plan:    Nodular lower lymphedema of the right lower extremity  Partial thickness ulceration anteriorly   Pathophysiology of lymphedema discussed  Will start compression with Colflex Lite  Lac Hydrin to dry skin  Wash skin of the right lower extremity with soap and water including the foot and toes  Alginate over the wound  He does have heart disease s/p triple bypass in 2018 without any history of CHF since then  · Order VNA for wound care  · Order LEADS right lower extremity         Subjective:   9/9/21: This is a 59-year-old male referred to the wound center from the emergency room because of swollen right lower extremity  Patient was recently hospitalized because of increased edema of the same limb  At that time he had no open wound  DVT was ruled out    Since discharge, he has had drainage from the anterior portion of the lower extremity  He has been using alginate and Ace wrap  He denies any pain  The patient has a history of CAD s/p CABG in 2018  He has not had any CHF since then  He is on diuretics  He notes that he is unable to care for the skin of his right leg because he cannot reach that low to wash  His leg is swollen from below the knee through the toes  Patient does have a history of PAD with last LEADS of the right lower extremity two years ago  Toe pressure was within the healing range        The following portions of the patient's history were reviewed and updated as appropriate:   Patient Active Problem List   Diagnosis    Anxiety and depression    Diabetic neuropathy, type II diabetes mellitus [facility]    Hyperlipidemia    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 ([facility] )    Vitamin D deficiency    PAD (peripheral artery disease) ([facility] )    S/P BKA (below knee amputation), left ([facility])    Orthostatic hypotension    Diabetic gastroparesis ([facility])    Class 2 severe obesity due to excess calories with serious comorbidity and body mass index (BMI) of 35 0 to 35 9 in adult [facility])    Triple vessel coronary artery disease    Hypertensive heart disease with congestive heart failure (HCC)    Acute on chronic diastolic heart failure (HCC)    Other constipation    S/P CABG x 3    Diabetic autonomic neuropathy associated with type 2 diabetes mellitus (HCC)    Hyponatremia    Obstructive sleep apnea    Amputated below knee, left ([facility])    Phantom limb syndrome without pain ([facility])    CAD (coronary artery disease)    Chronic venous insufficiency    Nodular rash    Elephantiasis nostras verrucosa    Other acute osteomyelitis, left ankle and foot ([facility] )    Embolism and thrombosis of arteries of the lower extremities (HCC)    Edema of right lower extremity    Dermatitis    Abdominal distension     Past Medical History:   Diagnosis Date    Anxiety  Anxiety and depression     Cataract     Congestive cardiac failure (HCC)     Coronary artery disease     Depression     Diabetes mellitus ([facility] )     Diabetic autonomic neuropathy associated with type 2 diabetes mellitus [facility] )     Last Assessed: 2017    History of DVT (deep vein thrombosis)     left LE    Hyperlipidemia     Lymphedema of right lower extremity     Obesity     Orthostatic hypotension      Past Surgical History:   Procedure Laterality Date    CORONARY ARTERY BYPASS GRAFT      EYE SURGERY      cataracts bilateral    LEG AMPUTATION THROUGH LOWER TIBIA AND FIBULA Left 8/3/2018    Procedure: AMPUTATION BELOW KNEE (BKA) L BKA;  Surgeon: Nito Palma MD;  [facility];  Service: Vascular    [place] CABG, ARTERY-VEIN, FOUR N/A 3/28/2018    Procedure: CORONARY ARTERY BYPASS GRAFT (CABG) x3 VESSELS, LIMA TO LAD, SVG--> PDA, SVG--> OM2,  RIGHT LEG EVH/SVH TO , INTRA-OP AMANDEEP;  Surgeon: Rosalia Reyes MD;  [facility];  Service: Cardiac Surgery    ROTATOR CUFF REPAIR Bilateral      Family History   Problem Relation Age of Onset    Atrial fibrillation Mother     Stroke Mother     Hypertension Father     Heart attack Paternal Grandfather 61    Diabetes Maternal Grandmother     Diabetes Maternal Grandfather     Diabetes Maternal Aunt     Diabetes Maternal Uncle       Social History     Socioeconomic History    Marital status: /Civil Union     Spouse name: None    Number of children: None    Years of education: None    Highest education level: None   Occupational History    None   Tobacco Use    Smoking status: Former Smoker     Packs/day: 1 00     Years: 12 00     Pack years: 12 00     Types: Cigarettes     Quit date: 3/23/1994     Years since quittin 4    Smokeless tobacco: Never Used   Vaping Use    Vaping Use: Never used   Substance and Sexual Activity    Alcohol use: No     Comment: rarely    Drug use: No    Sexual activity: Not Currently Other Topics Concern    None   Social History Narrative    None     Social Determinants of Health     Financial Resource Strain:     Difficulty of Paying Living Expenses:    Food Insecurity:     Worried About Running Out of Food in the Last Year:     [address] in the Last Year:    Transportation Needs:     Lack of Transportation (Medical):  Lack of Transportation (Non-Medical):    Physical Activity:     Days of Exercise per Week:     Minutes of Exercise per Session:    Stress:     Feeling of Stress :    Social Connections: Moderately Isolated    Frequency of Communication with Friends and Family: Three times a week    Frequency of Social Gatherings with Friends and Family: Three times a week    Attends Gnosticist Services: Never    Active Member of Clubs or Organizations: No    Attends Club or Organization Meetings: Never    Marital Status:    Intimate Partner Violence: Not At Risk    Fear of Current or Ex-Partner: No    Emotionally Abused: No    Physically Abused: No    Sexually Abused: No        Current Outpatient Medications:     ammonium lactate (LAC-HYDRIN) 12 % lotion, Apply twice daily or with dressing changes  , Disp: 400 g, Rfl: 1    atorvastatin (LIPITOR) 80 mg tablet, Take 1 tablet (80 mg total) by mouth daily, Disp: 90 tablet, Rfl: 1    cholecalciferol (VITAMIN D3) 1,000 units tablet, Take 1,000 Units by mouth daily, Disp: , Rfl:     clopidogrel (PLAVIX) 75 mg tablet, Take 1 tablet (75 mg total) by mouth daily, Disp: 90 tablet, Rfl: 1    finasteride (PROSCAR) 5 mg tablet, Take 1 tablet (5 mg total) by mouth daily, Disp: 90 tablet, Rfl: 1    furosemide (LASIX) 40 mg tablet, Take 1 tablet (40 mg total) by mouth 2 (two) times a day, Disp: 180 tablet, Rfl: 1    glucose blood test strip, ReliOn Prime Meter, Disp: , Rfl:     HumaLOG KwikPen 100 units/mL injection pen, INJECT 15 UNITS  SUBCUTANEOUSLY 3 TIMES  DAILY WITH MEALS, Disp: 45 mL, Rfl: 3    Lantus SoloStar 100 units/mL injection pen, Inject 80 Units under the skin every 12 (twelve) hours, Disp: 135 mL, Rfl: 3    metoclopramide (REGLAN) 5 mg tablet, Take 1 tablet (5 mg total) by mouth daily, Disp: 90 tablet, Rfl: 1    pantoprazole (PROTONIX) 40 mg tablet, Take 1 tablet (40 mg total) by mouth daily in the early morning, Disp: 90 tablet, Rfl: 1    tamsulosin (FLOMAX) 0 4 mg, Take 1 capsule (0 4 mg total) by mouth daily with dinner, Disp: 90 capsule, Rfl: 1  No current facility-administered medications for this visit  Review of Systems   Constitutional: Negative for appetite change, chills, fatigue, fever and unexpected weight change  HENT: Negative for congestion, hearing loss, postnasal drip and sinus pressure  Eyes: Positive for visual disturbance (Diabetic retinopathy)  Negative for discharge  Respiratory: Positive for shortness of breath (On exertion)  Negative for cough  Cardiovascular: Positive for leg swelling (Right lower extremity)  Negative for chest pain and palpitations  Gastrointestinal: Positive for constipation (Occasional)  Negative for abdominal pain, blood in stool, diarrhea and nausea  Endocrine: Negative  Genitourinary: Negative for difficulty urinating, dysuria and urgency  Musculoskeletal: Positive for gait problem (S/p left BKA)  Negative for back pain  Skin: Positive for wound (Right lower extremity)  Negative for rash  Allergic/Immunologic: Negative  Neurological: Positive for numbness (Peripheral neuropathy of the right lower extremity)  Negative for dizziness, tremors, seizures, weakness and headaches  Hematological: Does not bruise/bleed easily  Psychiatric/Behavioral: Negative  Negative for dysphoric mood  The patient is not nervous/anxious  Objective:  /64   Pulse 76   Temp 99 3 °F (37 4 °C)   Resp 18   Ht 6' 1" (1 854 m)   Wt 127 kg (280 lb)   BMI 36 94 kg/m²   Pain Score: 0-No pain     Physical Exam  Vitals and nursing note reviewed  Constitutional:       Appearance: Normal appearance  He is well-developed  He is obese  HENT:      Head: Normocephalic and atraumatic  Right Ear: External ear normal       Left Ear: External ear normal       Mouth/Throat:      Comments: masked  Eyes:      General: Lids are normal          Right eye: No discharge  Left eye: No discharge  Conjunctiva/sclera: Conjunctivae normal       Pupils: Pupils are equal, round, and reactive to light  Cardiovascular:      Rate and Rhythm: Normal rate and regular rhythm  Pulses:           Dorsalis pedis pulses are detected w/ Doppler on the right side  Posterior tibial pulses are detected w/ Doppler on the right side  Heart sounds: Normal heart sounds  No murmur heard  No friction rub  No gallop  Pulmonary:      Effort: Pulmonary effort is normal       Breath sounds: Decreased air movement present  Examination of the right-lower field reveals decreased breath sounds  Examination of the left-lower field reveals decreased breath sounds  Decreased breath sounds present  Abdominal:      General: Abdomen is protuberant  Palpations: Abdomen is soft  Tenderness: There is no abdominal tenderness  There is no guarding or rebound  Comments: Unable to adequately assess organomegaly due to protuberance, obese abdomen  No obvious tenderness  Musculoskeletal:      Cervical back: Neck supple  Right lower leg: No edema  Left lower leg: No edema  Left Lower Extremity: Left leg is amputated below knee  Feet:      Right foot:      Skin integrity: No ulcer  Lymphadenopathy:      Cervical: No cervical adenopathy  Skin:     General: Skin is warm and dry  Findings: Rash and wound present  Comments: VSU of the right pretibial area  Nodular lymphedema of the right lower extremity  Extensive waxy scaling of the right lower extremity  Dry skin of the foot and toes     Neurological:      Mental Status: He is alert and oriented to person, place, and time  Gait: Gait is intact  Psychiatric:         Attention and Perception: Attention normal          Mood and Affect: Mood and affect normal          Speech: Speech normal          Behavior: Behavior is cooperative  Cognition and Memory: Cognition normal          Wound 09/09/21 Venous Ulcer Pretibial Right; Anterior (Active)   Wound Image   09/09/21 1433   Wound Description Slough;Pink 09/09/21 1431   Anya-wound Assessment Maceration;Scaly 09/09/21 1431   Wound Length (cm) 9 cm 09/09/21 1431   Wound Width (cm) 12 cm 09/09/21 1431   Wound Depth (cm) 0 1 cm 09/09/21 1431   Wound Surface Area (cm^2) 108 cm^2 09/09/21 1431   Wound Volume (cm^3) 10 8 cm^3 09/09/21 1431   Calculated Wound Volume (cm^3) 10 8 cm^3 09/09/21 1431   Drainage Amount Large 09/09/21 1431   Drainage Description Serous;Tan;Green 09/09/21 1431                                      Wound Instructions:  Orders Placed This Encounter   Procedures    Wound cleansing and dressings     Right lower leg:  Wash your hands with soap and water  Remove old dressing, discard into plastic bag and place in trash  Cleanse the wound with mild soap (such as Dove) and water and rinse well with clear water prior to applying a clean dressing  Do not use tissue or cotton balls  Do not scrub the wound  Pat dry using gauze    Shower yes   Lac hydrin to skin on right lower leg  Please cleanse RLE with soap and water from base of toes to knee with each dressing change  Apply calcium alginate to wound bed   Cover with ABD  Secure with Co-Flex lite  Change dressing twice per week (Sunday and  Thursday)- (1 Co-Flex-lite sent with patient)    Elevate your right leg as much as possible as high as possible during the day when sitting and at night    Consider taking blood glucose regularly at home to ensure glucose control  3-4 servings protein in diet daily    Begin visiting nurses for dressing changes 2 x per week on Sundays and Thursdays, skipping the day the patient comes to the wound center    Follow up in 1 week Dr Abimbola Cline wound treatment note:  Cleansed with normal saline  Redressed as ordered above     Standing Status:   Future     Standing Expiration Date:   0/7/0778    Cast Application     This order was created via procedure documentation    Ambulatory Referral to Home Health     Standing Status:   Future     Standing Expiration Date:   9/9/2022     Referral Priority:   Routine     Referral Type:   Home Health     Referral Reason:   Specialty Services Required     Requested Specialty:   [address]     Number of Visits Requested:   1     Expiration Date:   9/9/2022       Total time spent today:  25 minutes  This includes reviewing the patient's chart, pertinent physician records including ER visit 8/29/21 and and CBC, CMP from 8/29/21 and A1c from 10/20/20  Jun Odell MD, CHT, CWS    Portions of the record may have been created with voice recognition software  Occasional wrong word or "sound alike" substitutions may have occurred due to the inherent limitations of voice recognition software  Read the chart carefully and recognize, using context, where substitutions have occurred

## 2021-09-09 NOTE — PROGRESS NOTES
Cast Application    Date/Time: 9/9/2021 3:58 PM  Performed by: Dionna Cortes RN  Authorized by: Philip Pleitez MD   Manson Protocol:  Consent: Verbal consent obtained  Consent given by: patient  Patient identity confirmed: verbally with patient      Pre-procedure details:     Sensation:  Normal  Procedure details:     Laterality:  Right    Location:  Leg    Leg:  R lower leg    Strapping: no  Cast type:  Multi-layer compression short leg      Supplies used: calcium alginate, ABDs, Co-flex lite  Post-procedure details:     Pain:  Improved    Sensation:  Normal    Patient tolerance of procedure: Tolerated well, no immediate complications  Comments:        Multiplayer wrap procedure     Before application, YUDITH and/or TBI determined to be adequate for healing and application of compression  Lower extremity washed prior to application of compression wrap  With the foot in dorsiflexed position, coflex was applied as per physician orders without complications or complaint of pain  The procedure was tolerated well  Toes warm & pink post application    Patient provided education & reinforced to observe toes for any discoloration, swelling or tingling and instructed when to report to the [address] [facility],[address] or to remove compression    Lac hydrin applied to RLE skin prior to application of co-flex-lite

## 2021-09-16 ENCOUNTER — CLINICAL SUPPORT (OUTPATIENT)
Dept: WOUND CARE | Facility: HOSPITAL | Age: 61
End: 2021-09-16
Payer: COMMERCIAL

## 2021-09-16 VITALS
HEART RATE: 72 BPM | SYSTOLIC BLOOD PRESSURE: 128 MMHG | DIASTOLIC BLOOD PRESSURE: 82 MMHG | TEMPERATURE: 96.8 F | RESPIRATION RATE: 18 BRPM

## 2021-09-16 DIAGNOSIS — L97.919 VENOUS ULCER OF RIGHT LEG (HCC): Primary | ICD-10-CM

## 2021-09-16 DIAGNOSIS — I83.019 VENOUS ULCER OF RIGHT LEG (HCC): Primary | ICD-10-CM

## 2021-09-16 PROCEDURE — 29581 APPL MULTLAYER CMPRN SYS LEG: CPT

## 2021-09-16 PROCEDURE — NC001 PR NO CHARGE

## 2021-09-16 NOTE — PROGRESS NOTES
Cast Application    Date/Time: 9/16/2021 12:56 PM  Performed by: Joe Eaton RN  Authorized by: Thi Appiah MD   Universal Protocol:  Consent: Verbal consent obtained  Consent given by: patient  Patient identity confirmed: verbally with patient      Pre-procedure details:     Sensation:  Unchanged  Procedure details:     Laterality:  Right    Location:  Leg    Leg:  R lower leg    Strapping: no  Cast type:  Multi-layer compression short leg      Supplies used: calcium alginate, ABDs, Co-Flex lite  Post-procedure details:     Pain:  Improved    Sensation:  Unchanged    Patient tolerance of procedure: Tolerated well, no immediate complications  Comments:        Multiplayer wrap procedure     Before application, YUDITH and/or TBI determined to be adequate for healing and application of compression  Lower extremity washed prior to application of compression wrap  With the foot in dorsiflexed position, coflex lite was applied as per physician orders without complications or complaint of pain  The procedure was tolerated well  Toes warm & pink post application  Patient provided education & reinforced to observe toes for any discoloration, swelling or tingling and instructed when to report to the [address] [facility],[address] or to remove compression    RLE cleansed with soap and water and rinsed well with clear water    Amlactin cream applied skin around wound on RLE  Redresed with calcium alginate, ABDs prior to Co-Flex Lite application

## 2021-09-16 NOTE — PATIENT INSTRUCTIONS
Orders Placed This Encounter   Procedures    Wound cleansing and dressings     OK for nurse visit this week due to transportation problems   Continue Lac hydrin to skin on right lower leg  Please cleanse RLE with soap and water from base of toes to knee with each dressing change  Apply calcium alginate to wound bed   Cover with ABD  Secure with Co-Flex lite  Change dressing twice per week (Monday and Friday)- (1 Co-Flex-lite sent with patient)     Elevate your right leg as much as possible as high as possible during the day when sitting and at night     Consider taking blood glucose regularly at home to ensure glucose control  3-4 servings protein in diet daily     Continue visiting nurses for dressing changes 2 x per week on Monday and Friday (PLEASE CHANGE ON 9/24- Co-Flex Lite sent with patient), skipping the day the patient comes to the wound center     Follow up in 1 week Dr Marlon Quinteros wound treatment note:  Cleansed with normal saline  Redressed as ordered above     Standing Status:   Future     Standing Expiration Date:   7/08/7837    Cast Application     This order was created via procedure documentation

## 2021-09-21 ENCOUNTER — VBI (OUTPATIENT)
Dept: ADMINISTRATIVE | Facility: OTHER | Age: 61
End: 2021-09-21

## 2021-09-24 ENCOUNTER — OFFICE VISIT (OUTPATIENT)
Dept: WOUND CARE | Facility: HOSPITAL | Age: 61
End: 2021-09-24
Payer: COMMERCIAL

## 2021-09-24 VITALS — HEART RATE: 80 BPM | SYSTOLIC BLOOD PRESSURE: 130 MMHG | TEMPERATURE: 97.9 F | DIASTOLIC BLOOD PRESSURE: 80 MMHG

## 2021-09-24 DIAGNOSIS — L97.919 VENOUS ULCER OF RIGHT LEG (HCC): Primary | ICD-10-CM

## 2021-09-24 DIAGNOSIS — L97.212 NON-PRESSURE CHRONIC ULCER OF RIGHT CALF WITH FAT LAYER EXPOSED (HCC): ICD-10-CM

## 2021-09-24 DIAGNOSIS — I83.019 VENOUS ULCER OF RIGHT LEG (HCC): Primary | ICD-10-CM

## 2021-09-24 DIAGNOSIS — I89.0 LYMPHEDEMA OF RIGHT LOWER EXTREMITY: ICD-10-CM

## 2021-09-24 PROCEDURE — 99213 OFFICE O/P EST LOW 20 MIN: CPT | Performed by: FAMILY MEDICINE

## 2021-09-24 NOTE — PATIENT INSTRUCTIONS
Orders Placed This Encounter   Procedures    Wound cleansing and dressings     Wound cleansing and dressings       Right lower leg:  Wash your hands with soap and water  Remove old dressing, discard into plastic bag and place in trash  Cleanse the wound and entire right leg with hibiclens  and rinse well with clear water prior to applying a clean dressing  Do not use tissue or cotton balls  Do not scrub the wound  Pat dry using gauze  Shower yes   Lac hydrin to skin on right lower leg  Paint wound with betadine  Cover with opticlock dressing  Apply cast padding around ankle for protection from optilock edges  Secure with Co-Flex lite  Change dressing twice per week (Sunday and Thursday)     Elevate your right leg as much as possible as high as possible during the day when sitting and at night     Consider taking blood glucose regularly at home to ensure glucose control  3-4 servings protein in diet daily     Begin visiting nurses for dressing changes 2 x per week on Sundays and Thursdays, skipping the day the patient comes to the wound center     Follow up in 1 week Dr Theresa Godfrey wound treatment note:  Cleansed with normal saline  Redressed as ordered above    Ian [id number]!!!!  IF YOU CAN NOT OBTAIN THESE WRAPS PLEASE [facility]       Standing Status:   Future     Standing Expiration Date:   9/24/2022

## 2021-09-24 NOTE — PROGRESS NOTES
Patient ID: Stuart Andrade is a 57 y o  male Date of Birth 1960       Chief Complaint   Patient presents with    Follow Up Wound Care Visit     Pt here for follow up of right leg ucler  Allergies:  Metformin    Diagnosis:   Diagnosis ICD-10-CM Associated Orders   1  Venous ulcer of right leg ([facility])  I83 019 Wound cleansing and dressings    L97 919    2  Non-pressure chronic ulcer of right calf with fat layer exposed ([address] )  L97 212    3  Lymphedema of right lower extremity  I89 0         Assessment  & Plan:   Verrucous lymphedema of the right lower extremity  Still with moderate drainage  Wash area with Hibiclens sponge  Paint with Betadine for hypergranulation tissue and place Optilock followed by Colflex Lite  Subjective:   9/9/21: This is a 28-year-old male referred to the wound center from the emergency room because of swollen right lower extremity  Patient was recently hospitalized because of increased edema of the same limb  At that time he had no open wound  DVT was ruled out  Since discharge, he has had drainage from the anterior portion of the lower extremity  He has been using alginate and Ace wrap  He denies any pain  The patient has a history of CAD s/p CABG in 2018  He has not had any CHF since then  He is on diuretics  He notes that he is unable to care for the skin of his right leg because he cannot reach that low to wash  His leg is swollen from below the knee through the toes  Patient does have a history of PAD with last LEADS of the right lower extremity two years ago  Toe pressure was within the healing range  9/24/21: Followup venous stasis ulcer with lymphedema of the right lower extremity  VNA has still not obtained product for compression wraps  No complaints  Moderate drainage  No fever chills and no pain          The following portions of the patient's history were reviewed and updated as appropriate:   Patient Active Problem List   Diagnosis    Anxiety and depression    Diabetic neuropathy, type II diabetes mellitus ([facility] )    Hyperlipidemia    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 ([facility])    Vitamin D deficiency    PAD (peripheral artery disease) ([facility])    S/P BKA (below knee amputation), left ([facility])    Orthostatic hypotension    Diabetic gastroparesis ([facility])    Class 2 severe obesity due to excess calories with serious comorbidity and body mass index (BMI) of 35 0 to 35 9 in adult [facility])    Triple vessel coronary artery disease    Hypertensive heart disease with congestive heart failure (HCC)    Acute on chronic diastolic heart failure (HCC)    Other constipation    S/P CABG x 3    Diabetic autonomic neuropathy associated with type 2 diabetes mellitus ([facility])    Hyponatremia    Obstructive sleep apnea    Amputated below knee, left ([facility])    Phantom limb syndrome without pain ([facility])    CAD (coronary artery disease)    Chronic venous insufficiency    Nodular rash    Elephantiasis nostras verrucosa    Other acute osteomyelitis, left ankle and foot ([facility])    Embolism and thrombosis of arteries of the lower extremities ([facility])    Edema of right lower extremity    Dermatitis    Abdominal distension     Past Medical History:   Diagnosis Date    Anxiety     Anxiety and depression     Cataract     Congestive cardiac failure ([facility] )     Coronary artery disease     Depression     Diabetes mellitus ([facility] )     Diabetic autonomic neuropathy associated with type 2 diabetes mellitus [facility]     Last Assessed: 12/28/2017    History of DVT (deep vein thrombosis)     left LE    Hyperlipidemia     Lymphedema of right lower extremity     Obesity     Orthostatic hypotension      Past Surgical History:   Procedure Laterality Date    CORONARY ARTERY BYPASS GRAFT      EYE SURGERY      cataracts bilateral    LEG AMPUTATION THROUGH LOWER TIBIA AND FIBULA Left 8/3/2018    Procedure: AMPUTATION BELOW KNEE (BKA) SHELLY WALSH;  Surgeon: Maria Major MD;  [facility];  Service: Vascular    MN CABG, ARTERY-VEIN, FOUR N/A 3/28/2018    Procedure: CORONARY ARTERY BYPASS GRAFT (CABG) x3 VESSELS, LIMA TO LAD, SVG--> PDA, SVG--> OM2,  RIGHT LEG EVH/SVH TO , INTRA-OP AMANDEEP;  Surgeon: Anahy Watson MD;  [facility];  Service: Cardiac Surgery    ROTATOR CUFF REPAIR Bilateral      Family History   Problem Relation Age of Onset    Atrial fibrillation Mother     Stroke Mother     Hypertension Father     Heart attack Paternal Grandfather 61    Diabetes Maternal Grandmother     Diabetes Maternal Grandfather     Diabetes Maternal Aunt     Diabetes Maternal Uncle      Social History     Socioeconomic History    Marital status: /Civil Union     Spouse name: None    Number of children: None    Years of education: None    Highest education level: None   Occupational History    None   Tobacco Use    Smoking status: Former Smoker     Packs/day: 1 00     Years: 12 00     Pack years: 12 00     Types: Cigarettes     Quit date: 3/23/1994     Years since quittin 5    Smokeless tobacco: Never Used   Vaping Use    Vaping Use: Never used   Substance and Sexual Activity    Alcohol use: No     Comment: rarely    Drug use: No    Sexual activity: Not Currently   Other Topics Concern    None   Social History Narrative    None     Social Determinants of Health     Financial Resource Strain:     Difficulty of Paying Living Expenses:    Food Insecurity:     Worried About Running Out of Food in the Last Year:     Ran Out of Food in the Last Year:    Transportation Needs:     Lack of Transportation (Medical):  Lack of Transportation (Non-Medical):    Physical Activity:     Days of Exercise per Week:     Minutes of Exercise per Session:    Stress:     Feeling of Stress :    Social Connections: Moderately Isolated    Frequency of Communication with Friends and Family:  Three times a week    Frequency of Social Gatherings with Friends and Family: Three times a week    Attends Islam Services: Never    Active Member of Clubs or Organizations: No    Attends Club or Organization Meetings: Never    Marital Status:    Intimate Partner Violence: Not At Risk    Fear of Current or Ex-Partner: No    Emotionally Abused: No    Physically Abused: No    Sexually Abused: No       Current Outpatient Medications:     ammonium lactate (LAC-HYDRIN) 12 % lotion, Apply twice daily or with dressing changes  , Disp: 400 g, Rfl: 1    atorvastatin (LIPITOR) 80 mg tablet, TAKE 1 TABLET BY MOUTH  DAILY, Disp: 90 tablet, Rfl: 0    cholecalciferol (VITAMIN D3) 1,000 units tablet, Take 1,000 Units by mouth daily, Disp: , Rfl:     clopidogrel (PLAVIX) 75 mg tablet, TAKE 1 TABLET BY MOUTH  DAILY, Disp: 90 tablet, Rfl: 0    finasteride (PROSCAR) 5 mg tablet, TAKE 1 TABLET BY MOUTH  DAILY, Disp: 90 tablet, Rfl: 0    furosemide (LASIX) 40 mg tablet, TAKE 1 TABLET BY MOUTH  TWICE DAILY, Disp: 180 tablet, Rfl: 0    glucose blood test strip, ReliOn Prime Meter, Disp: , Rfl:     HumaLOG KwikPen 100 units/mL injection pen, INJECT 15 UNITS  SUBCUTANEOUSLY 3 TIMES  DAILY WITH MEALS, Disp: 45 mL, Rfl: 3    Lantus SoloStar 100 units/mL injection pen, Inject 80 Units under the skin every 12 (twelve) hours, Disp: 135 mL, Rfl: 3    metoclopramide (REGLAN) 5 mg tablet, TAKE 1 TABLET BY MOUTH  DAILY, Disp: 90 tablet, Rfl: 0    pantoprazole (PROTONIX) 40 mg tablet, TAKE 1 TABLET BY MOUTH  DAILY IN THE EARLY MORNING, Disp: 90 tablet, Rfl: 0    tamsulosin (FLOMAX) 0 4 mg, TAKE 1 CAPSULE BY MOUTH  DAILY WITH DINNER, Disp: 90 capsule, Rfl: 0    Review of Systems   Constitutional: Negative for appetite change, chills, fatigue, fever and unexpected weight change  HENT: Negative for congestion, hearing loss and postnasal drip  Eyes: Positive for visual disturbance  Respiratory: Positive for shortness of breath  Negative for cough  Cardiovascular: Positive for leg swelling (Right)  Musculoskeletal: Positive for gait problem (Left BKA)  Skin: Positive for wound (Right lower extremity)  Negative for rash  Neurological: Positive for numbness (Right lower extremity)  Hematological: Does not bruise/bleed easily  Psychiatric/Behavioral: Negative  Objective:  /80   Pulse 80   Temp 97 9 °F (36 6 °C)   Pain Score: 0-No pain     Physical Exam  Vitals and nursing note reviewed  Constitutional:       Appearance: Normal appearance  He is well-developed  He is obese  HENT:      Head: Normocephalic and atraumatic  Cardiovascular:      Rate and Rhythm: Normal rate  Pulmonary:      Effort: Pulmonary effort is normal    Skin:     General: Skin is warm and dry  Findings: Wound present  No erythema  Comments: Lymphedema of the right lower extremity with large open area anteriorly with moderate serosanguineous drainage  Hypergranulation tissue  Slight malodor  No evidence of cellulitis at this time  Thick, waxy scales  Neurological:      Mental Status: He is alert and oriented to person, place, and time  Gait: Gait abnormal    Psychiatric:         Attention and Perception: Attention normal          Mood and Affect: Mood and affect normal          Behavior: Behavior is cooperative  Cognition and Memory: Cognition normal            Wound 09/09/21 Venous Ulcer Pretibial Right; Anterior (Active)   Wound Image   09/24/21 1307   Wound Description Pink; Hypergranulation 09/24/21 1311   Anya-wound Assessment Scaly 09/24/21 1311   Wound Length (cm) 11 5 cm 09/24/21 1311   Wound Width (cm) 15 cm 09/24/21 1311   Wound Depth (cm) 0 1 cm 09/24/21 1311   Wound Surface Area (cm^2) 172 5 cm^2 09/24/21 1311   Wound Volume (cm^3) 17 25 cm^3 09/24/21 1311   Calculated Wound Volume (cm^3) 17 25 cm^3 09/24/21 1311   Change in Wound Size % -59 72 09/24/21 1311   Drainage Amount Large 09/24/21 1311   Drainage Description Serous; Yellow 09/24/21 1311   Treatments Cleansed 09/24/21 1311   Dressing Changed Changed 09/24/21 1311   Patient Tolerance Tolerated well 09/24/21 1311   Dressing Status Removed 09/24/21 1311                 Wound Instructions:  Orders Placed This Encounter   Procedures    Wound cleansing and dressings     Wound cleansing and dressings       Right lower leg:  Wash your hands with soap and water  Remove old dressing, discard into plastic bag and place in trash  Cleanse the wound and entire right leg with hibiclens  and rinse well with clear water prior to applying a clean dressing  Do not use tissue or cotton balls  Do not scrub the wound  Pat dry using gauze  Shower yes   Lac hydrin to skin on right lower leg  Paint wound with betadine  Cover with opticlock dressing  Apply cast padding around ankle for protection from optilock edges  Secure with Co-Flex lite  Change dressing twice per week (Sunday and Thursday)     Elevate your right leg as much as possible as high as possible during the day when sitting and at night     Consider taking blood glucose regularly at home to ensure glucose control  3-4 servings protein in diet daily     Begin visiting nurses for dressing changes 2 x per week on Sundays and Thursdays, skipping the day the patient comes to the wound center     Follow up in 1 week Dr Gonzalez Nichols wound treatment note:  Cleansed with normal saline  Redressed as ordered above    Ian [id number]!!!!  IF YOU CAN NOT OBTAIN THESE WRAPS PLEASE CALL [facility] SO WE [facility]  Standing Status:   Future     Standing Expiration Date:   9/24/2022       Cortez Franks MD, CHT, CWS       Portions of the record may have been created with voice recognition software  Occasional wrong word or "sound alike" substitutions may have occurred due to the inherent limitations of voice recognition software   Read the chart carefully and recognize, using context, where substitutions have occurred

## 2021-10-01 ENCOUNTER — OFFICE VISIT (OUTPATIENT)
Dept: WOUND CARE | Facility: HOSPITAL | Age: 61
End: 2021-10-01
Payer: COMMERCIAL

## 2021-10-01 VITALS — TEMPERATURE: 98.7 F | HEART RATE: 74 BPM | SYSTOLIC BLOOD PRESSURE: 144 MMHG | DIASTOLIC BLOOD PRESSURE: 70 MMHG

## 2021-10-01 DIAGNOSIS — L97.212 NON-PRESSURE CHRONIC ULCER OF RIGHT CALF WITH FAT LAYER EXPOSED (HCC): ICD-10-CM

## 2021-10-01 DIAGNOSIS — I83.019 VENOUS ULCER OF RIGHT LEG (HCC): Primary | ICD-10-CM

## 2021-10-01 DIAGNOSIS — I89.0 LYMPHEDEMA OF RIGHT LOWER EXTREMITY: ICD-10-CM

## 2021-10-01 DIAGNOSIS — L97.919 VENOUS ULCER OF RIGHT LEG (HCC): Primary | ICD-10-CM

## 2021-10-01 PROCEDURE — 99213 OFFICE O/P EST LOW 20 MIN: CPT | Performed by: FAMILY MEDICINE

## 2021-10-01 PROCEDURE — 29581 APPL MULTLAYER CMPRN SYS LEG: CPT

## 2021-10-08 ENCOUNTER — OFFICE VISIT (OUTPATIENT)
Dept: WOUND CARE | Facility: HOSPITAL | Age: 61
End: 2021-10-08
Payer: COMMERCIAL

## 2021-10-08 VITALS — TEMPERATURE: 97.9 F | DIASTOLIC BLOOD PRESSURE: 62 MMHG | HEART RATE: 80 BPM | SYSTOLIC BLOOD PRESSURE: 154 MMHG

## 2021-10-08 DIAGNOSIS — L97.919 VENOUS ULCER OF RIGHT LEG (HCC): Primary | ICD-10-CM

## 2021-10-08 DIAGNOSIS — Z89.512 S/P BKA (BELOW KNEE AMPUTATION), LEFT (HCC): ICD-10-CM

## 2021-10-08 DIAGNOSIS — I73.9 PAD (PERIPHERAL ARTERY DISEASE) (HCC): ICD-10-CM

## 2021-10-08 DIAGNOSIS — I89.0 LYMPHEDEMA OF RIGHT LOWER EXTREMITY: ICD-10-CM

## 2021-10-08 DIAGNOSIS — I83.019 VENOUS ULCER OF RIGHT LEG (HCC): Primary | ICD-10-CM

## 2021-10-08 DIAGNOSIS — L97.212 NON-PRESSURE CHRONIC ULCER OF RIGHT CALF WITH FAT LAYER EXPOSED (HCC): ICD-10-CM

## 2021-10-08 PROCEDURE — 29581 APPL MULTLAYER CMPRN SYS LEG: CPT

## 2021-10-08 PROCEDURE — 99214 OFFICE O/P EST MOD 30 MIN: CPT | Performed by: FAMILY MEDICINE

## 2021-10-08 RX ORDER — SODIUM HYPOCHLORITE 2.5 MG/ML
SOLUTION TOPICAL
Qty: 473 ML | Refills: 0 | Status: SHIPPED | OUTPATIENT
Start: 2021-10-08 | End: 2021-11-05

## 2021-10-14 ENCOUNTER — OFFICE VISIT (OUTPATIENT)
Dept: WOUND CARE | Facility: HOSPITAL | Age: 61
End: 2021-10-14
Payer: COMMERCIAL

## 2021-10-14 VITALS — DIASTOLIC BLOOD PRESSURE: 74 MMHG | HEART RATE: 74 BPM | SYSTOLIC BLOOD PRESSURE: 124 MMHG | TEMPERATURE: 96.8 F

## 2021-10-14 DIAGNOSIS — I73.9 PAD (PERIPHERAL ARTERY DISEASE) (HCC): ICD-10-CM

## 2021-10-14 DIAGNOSIS — L97.212 NON-PRESSURE CHRONIC ULCER OF RIGHT CALF WITH FAT LAYER EXPOSED (HCC): ICD-10-CM

## 2021-10-14 DIAGNOSIS — I89.0 LYMPHEDEMA OF RIGHT LOWER EXTREMITY: ICD-10-CM

## 2021-10-14 DIAGNOSIS — E11.8 TYPE 2 DIABETES MELLITUS WITH COMPLICATION, WITH LONG-TERM CURRENT USE OF INSULIN (HCC): ICD-10-CM

## 2021-10-14 DIAGNOSIS — L03.115 CELLULITIS OF RIGHT LEG: Primary | ICD-10-CM

## 2021-10-14 DIAGNOSIS — Z79.4 TYPE 2 DIABETES MELLITUS WITH COMPLICATION, WITH LONG-TERM CURRENT USE OF INSULIN (HCC): ICD-10-CM

## 2021-10-14 DIAGNOSIS — L97.919 VENOUS ULCER OF RIGHT LEG (HCC): ICD-10-CM

## 2021-10-14 DIAGNOSIS — I83.019 VENOUS ULCER OF RIGHT LEG (HCC): ICD-10-CM

## 2021-10-14 PROCEDURE — 99214 OFFICE O/P EST MOD 30 MIN: CPT | Performed by: FAMILY MEDICINE

## 2021-10-14 PROCEDURE — 29581 APPL MULTLAYER CMPRN SYS LEG: CPT

## 2021-10-14 RX ORDER — LEVOFLOXACIN 500 MG/1
500 TABLET, FILM COATED ORAL EVERY 24 HOURS
Qty: 10 TABLET | Refills: 0 | Status: SHIPPED | OUTPATIENT
Start: 2021-10-14 | End: 2021-10-24

## 2021-10-16 ENCOUNTER — APPOINTMENT (OUTPATIENT)
Dept: LAB | Age: 61
End: 2021-10-16
Payer: COMMERCIAL

## 2021-10-16 DIAGNOSIS — Z79.4 TYPE 2 DIABETES MELLITUS WITH COMPLICATION, WITH LONG-TERM CURRENT USE OF INSULIN (HCC): ICD-10-CM

## 2021-10-16 DIAGNOSIS — E11.8 TYPE 2 DIABETES MELLITUS WITH COMPLICATION, WITH LONG-TERM CURRENT USE OF INSULIN (HCC): ICD-10-CM

## 2021-10-16 DIAGNOSIS — L03.115 CELLULITIS OF RIGHT LEG: ICD-10-CM

## 2021-10-16 LAB
ALBUMIN SERPL BCP-MCNC: 2.7 G/DL (ref 3.5–5)
ALP SERPL-CCNC: 59 U/L (ref 46–116)
ALT SERPL W P-5'-P-CCNC: 28 U/L (ref 12–78)
ANION GAP SERPL CALCULATED.3IONS-SCNC: 4 MMOL/L (ref 4–13)
AST SERPL W P-5'-P-CCNC: 18 U/L (ref 5–45)
BASOPHILS # BLD AUTO: 0.06 THOUSANDS/ΜL (ref 0–0.1)
BASOPHILS NFR BLD AUTO: 1 % (ref 0–1)
BILIRUB SERPL-MCNC: 0.5 MG/DL (ref 0.2–1)
BUN SERPL-MCNC: 16 MG/DL (ref 5–25)
CALCIUM ALBUM COR SERPL-MCNC: 10 MG/DL (ref 8.3–10.1)
CALCIUM SERPL-MCNC: 9 MG/DL (ref 8.3–10.1)
CHLORIDE SERPL-SCNC: 103 MMOL/L (ref 100–108)
CO2 SERPL-SCNC: 29 MMOL/L (ref 21–32)
CREAT SERPL-MCNC: 1.13 MG/DL (ref 0.6–1.3)
EOSINOPHIL # BLD AUTO: 0.3 THOUSAND/ΜL (ref 0–0.61)
EOSINOPHIL NFR BLD AUTO: 3 % (ref 0–6)
ERYTHROCYTE [DISTWIDTH] IN BLOOD BY AUTOMATED COUNT: 14 % (ref 11.6–15.1)
EST. AVERAGE GLUCOSE BLD GHB EST-MCNC: 180 MG/DL
GFR SERPL CREATININE-BSD FRML MDRD: 69 ML/MIN/1.73SQ M
GLUCOSE SERPL-MCNC: 138 MG/DL (ref 65–140)
HBA1C MFR BLD: 7.9 %
HCT VFR BLD AUTO: 41.5 % (ref 36.5–49.3)
HGB BLD-MCNC: 13 G/DL (ref 12–17)
IMM GRANULOCYTES # BLD AUTO: 0.05 THOUSAND/UL (ref 0–0.2)
IMM GRANULOCYTES NFR BLD AUTO: 1 % (ref 0–2)
LYMPHOCYTES # BLD AUTO: 1.57 THOUSANDS/ΜL (ref 0.6–4.47)
LYMPHOCYTES NFR BLD AUTO: 15 % (ref 14–44)
MCH RBC QN AUTO: 27.7 PG (ref 26.8–34.3)
MCHC RBC AUTO-ENTMCNC: 31.3 G/DL (ref 31.4–37.4)
MCV RBC AUTO: 88 FL (ref 82–98)
MONOCYTES # BLD AUTO: 0.63 THOUSAND/ΜL (ref 0.17–1.22)
MONOCYTES NFR BLD AUTO: 6 % (ref 4–12)
NEUTROPHILS # BLD AUTO: 7.93 THOUSANDS/ΜL (ref 1.85–7.62)
NEUTS SEG NFR BLD AUTO: 74 % (ref 43–75)
NRBC BLD AUTO-RTO: 0 /100 WBCS
PLATELET # BLD AUTO: 300 THOUSANDS/UL (ref 149–390)
PMV BLD AUTO: 11.7 FL (ref 8.9–12.7)
POTASSIUM SERPL-SCNC: 4.3 MMOL/L (ref 3.5–5.3)
PROT SERPL-MCNC: 7.8 G/DL (ref 6.4–8.2)
RBC # BLD AUTO: 4.7 MILLION/UL (ref 3.88–5.62)
SODIUM SERPL-SCNC: 136 MMOL/L (ref 136–145)
WBC # BLD AUTO: 10.54 THOUSAND/UL (ref 4.31–10.16)

## 2021-10-16 PROCEDURE — 83036 HEMOGLOBIN GLYCOSYLATED A1C: CPT

## 2021-10-16 PROCEDURE — 36415 COLL VENOUS BLD VENIPUNCTURE: CPT

## 2021-10-16 PROCEDURE — 80053 COMPREHEN METABOLIC PANEL: CPT

## 2021-10-16 PROCEDURE — 85025 COMPLETE CBC W/AUTO DIFF WBC: CPT

## 2021-10-22 ENCOUNTER — OFFICE VISIT (OUTPATIENT)
Dept: WOUND CARE | Facility: HOSPITAL | Age: 61
End: 2021-10-22
Payer: COMMERCIAL

## 2021-10-22 VITALS
TEMPERATURE: 97.9 F | HEART RATE: 72 BPM | RESPIRATION RATE: 18 BRPM | DIASTOLIC BLOOD PRESSURE: 76 MMHG | SYSTOLIC BLOOD PRESSURE: 122 MMHG

## 2021-10-22 DIAGNOSIS — I83.019 VENOUS ULCER OF RIGHT LEG (HCC): Primary | ICD-10-CM

## 2021-10-22 DIAGNOSIS — E11.8 TYPE 2 DIABETES MELLITUS WITH COMPLICATION, WITH LONG-TERM CURRENT USE OF INSULIN (HCC): ICD-10-CM

## 2021-10-22 DIAGNOSIS — Z79.4 TYPE 2 DIABETES MELLITUS WITH COMPLICATION, WITH LONG-TERM CURRENT USE OF INSULIN (HCC): ICD-10-CM

## 2021-10-22 DIAGNOSIS — L97.212 NON-PRESSURE CHRONIC ULCER OF RIGHT CALF WITH FAT LAYER EXPOSED (HCC): ICD-10-CM

## 2021-10-22 DIAGNOSIS — L03.115 CELLULITIS OF RIGHT LEG: ICD-10-CM

## 2021-10-22 DIAGNOSIS — L97.919 VENOUS ULCER OF RIGHT LEG (HCC): Primary | ICD-10-CM

## 2021-10-22 DIAGNOSIS — I89.0 LYMPHEDEMA OF RIGHT LOWER EXTREMITY: ICD-10-CM

## 2021-10-22 PROCEDURE — 29581 APPL MULTLAYER CMPRN SYS LEG: CPT

## 2021-10-22 PROCEDURE — 99213 OFFICE O/P EST LOW 20 MIN: CPT | Performed by: FAMILY MEDICINE

## 2021-10-29 ENCOUNTER — OFFICE VISIT (OUTPATIENT)
Dept: WOUND CARE | Facility: HOSPITAL | Age: 61
End: 2021-10-29
Payer: COMMERCIAL

## 2021-10-29 VITALS
SYSTOLIC BLOOD PRESSURE: 142 MMHG | HEART RATE: 72 BPM | DIASTOLIC BLOOD PRESSURE: 90 MMHG | TEMPERATURE: 98.4 F | RESPIRATION RATE: 18 BRPM

## 2021-10-29 DIAGNOSIS — I83.019 VENOUS ULCER OF RIGHT LEG (HCC): Primary | ICD-10-CM

## 2021-10-29 DIAGNOSIS — L97.919 VENOUS ULCER OF RIGHT LEG (HCC): Primary | ICD-10-CM

## 2021-10-29 DIAGNOSIS — E11.8 TYPE 2 DIABETES MELLITUS WITH COMPLICATION, WITH LONG-TERM CURRENT USE OF INSULIN (HCC): ICD-10-CM

## 2021-10-29 DIAGNOSIS — I73.9 PAD (PERIPHERAL ARTERY DISEASE) (HCC): ICD-10-CM

## 2021-10-29 DIAGNOSIS — L97.212 NON-PRESSURE CHRONIC ULCER OF RIGHT CALF WITH FAT LAYER EXPOSED (HCC): ICD-10-CM

## 2021-10-29 DIAGNOSIS — Z79.4 TYPE 2 DIABETES MELLITUS WITH COMPLICATION, WITH LONG-TERM CURRENT USE OF INSULIN (HCC): ICD-10-CM

## 2021-10-29 DIAGNOSIS — L03.115 CELLULITIS OF RIGHT LEG: ICD-10-CM

## 2021-10-29 DIAGNOSIS — I89.0 LYMPHEDEMA OF RIGHT LOWER EXTREMITY: ICD-10-CM

## 2021-10-29 PROCEDURE — 99213 OFFICE O/P EST LOW 20 MIN: CPT

## 2021-10-29 PROCEDURE — 29581 APPL MULTLAYER CMPRN SYS LEG: CPT

## 2021-11-03 NOTE — ASSESSMENT & PLAN NOTE
Continue Plavix statin Render Risk Assessment In Note?: yes Comment: From patient's cat and dog. Detail Level: Simple

## 2021-11-05 ENCOUNTER — OFFICE VISIT (OUTPATIENT)
Dept: WOUND CARE | Facility: HOSPITAL | Age: 61
End: 2021-11-05
Payer: COMMERCIAL

## 2021-11-05 VITALS
HEART RATE: 88 BPM | TEMPERATURE: 99.2 F | SYSTOLIC BLOOD PRESSURE: 112 MMHG | RESPIRATION RATE: 20 BRPM | DIASTOLIC BLOOD PRESSURE: 62 MMHG

## 2021-11-05 DIAGNOSIS — L97.919 VENOUS ULCER OF RIGHT LEG (HCC): Primary | ICD-10-CM

## 2021-11-05 DIAGNOSIS — I89.0 LYMPHEDEMA OF RIGHT LOWER EXTREMITY: ICD-10-CM

## 2021-11-05 DIAGNOSIS — I87.2 VENOUS STASIS DERMATITIS OF RIGHT LOWER EXTREMITY: ICD-10-CM

## 2021-11-05 DIAGNOSIS — I83.019 VENOUS ULCER OF RIGHT LEG (HCC): Primary | ICD-10-CM

## 2021-11-05 DIAGNOSIS — L97.212 NON-PRESSURE CHRONIC ULCER OF RIGHT CALF WITH FAT LAYER EXPOSED (HCC): ICD-10-CM

## 2021-11-05 PROCEDURE — 99214 OFFICE O/P EST MOD 30 MIN: CPT | Performed by: FAMILY MEDICINE

## 2021-11-05 PROCEDURE — 29581 APPL MULTLAYER CMPRN SYS LEG: CPT

## 2021-11-05 PROCEDURE — NC001 PR NO CHARGE

## 2021-11-05 RX ORDER — BETAMETHASONE DIPROPIONATE 0.5 MG/G
CREAM TOPICAL
Qty: 30 G | Refills: 1 | Status: SHIPPED | OUTPATIENT
Start: 2021-11-05

## 2021-11-12 ENCOUNTER — OFFICE VISIT (OUTPATIENT)
Dept: WOUND CARE | Facility: HOSPITAL | Age: 61
End: 2021-11-12
Payer: COMMERCIAL

## 2021-11-12 VITALS
DIASTOLIC BLOOD PRESSURE: 60 MMHG | TEMPERATURE: 97.3 F | RESPIRATION RATE: 16 BRPM | HEART RATE: 66 BPM | SYSTOLIC BLOOD PRESSURE: 140 MMHG

## 2021-11-12 DIAGNOSIS — I83.019 VENOUS ULCER OF RIGHT LEG (HCC): ICD-10-CM

## 2021-11-12 DIAGNOSIS — L97.212 NON-PRESSURE CHRONIC ULCER OF RIGHT CALF WITH FAT LAYER EXPOSED (HCC): Primary | ICD-10-CM

## 2021-11-12 DIAGNOSIS — I87.2 VENOUS STASIS DERMATITIS OF RIGHT LOWER EXTREMITY: ICD-10-CM

## 2021-11-12 DIAGNOSIS — I89.0 LYMPHEDEMA OF RIGHT LOWER EXTREMITY: ICD-10-CM

## 2021-11-12 DIAGNOSIS — L97.919 VENOUS ULCER OF RIGHT LEG (HCC): ICD-10-CM

## 2021-11-12 PROCEDURE — 99213 OFFICE O/P EST LOW 20 MIN: CPT | Performed by: FAMILY MEDICINE

## 2021-11-12 PROCEDURE — 29581 APPL MULTLAYER CMPRN SYS LEG: CPT

## 2021-11-12 RX ORDER — LIDOCAINE 40 MG/G
CREAM TOPICAL ONCE
Status: COMPLETED | OUTPATIENT
Start: 2021-11-12 | End: 2021-11-12

## 2021-11-12 RX ADMIN — LIDOCAINE: 40 CREAM TOPICAL at 13:34

## 2021-11-19 ENCOUNTER — OFFICE VISIT (OUTPATIENT)
Dept: WOUND CARE | Facility: HOSPITAL | Age: 61
End: 2021-11-19
Payer: COMMERCIAL

## 2021-11-19 VITALS
RESPIRATION RATE: 16 BRPM | HEART RATE: 76 BPM | TEMPERATURE: 97.9 F | DIASTOLIC BLOOD PRESSURE: 82 MMHG | SYSTOLIC BLOOD PRESSURE: 110 MMHG

## 2021-11-19 DIAGNOSIS — L97.919 VENOUS ULCER OF RIGHT LEG (HCC): Primary | ICD-10-CM

## 2021-11-19 DIAGNOSIS — L97.212 NON-PRESSURE CHRONIC ULCER OF RIGHT CALF WITH FAT LAYER EXPOSED (HCC): ICD-10-CM

## 2021-11-19 DIAGNOSIS — I83.019 VENOUS ULCER OF RIGHT LEG (HCC): Primary | ICD-10-CM

## 2021-11-19 DIAGNOSIS — I89.0 LYMPHEDEMA OF RIGHT LOWER EXTREMITY: ICD-10-CM

## 2021-11-19 DIAGNOSIS — I87.2 VENOUS STASIS DERMATITIS OF RIGHT LOWER EXTREMITY: ICD-10-CM

## 2021-11-19 PROCEDURE — 99213 OFFICE O/P EST LOW 20 MIN: CPT | Performed by: FAMILY MEDICINE

## 2021-11-19 PROCEDURE — 29581 APPL MULTLAYER CMPRN SYS LEG: CPT

## 2021-11-26 DIAGNOSIS — IMO0002 UNCONTROLLED DIABETES MELLITUS TYPE 2 WITH ATHEROSCLEROSIS OF ARTERIES OF EXTREMITIES: ICD-10-CM

## 2021-11-26 RX ORDER — FUROSEMIDE 40 MG/1
40 TABLET ORAL 2 TIMES DAILY
Qty: 60 TABLET | Refills: 0 | Status: SHIPPED | OUTPATIENT
Start: 2021-11-26

## 2021-12-02 ENCOUNTER — VBI (OUTPATIENT)
Dept: ADMINISTRATIVE | Facility: OTHER | Age: 61
End: 2021-12-02

## 2021-12-03 ENCOUNTER — OFFICE VISIT (OUTPATIENT)
Dept: WOUND CARE | Facility: HOSPITAL | Age: 61
End: 2021-12-03
Payer: COMMERCIAL

## 2021-12-03 VITALS
TEMPERATURE: 98.5 F | DIASTOLIC BLOOD PRESSURE: 74 MMHG | SYSTOLIC BLOOD PRESSURE: 126 MMHG | RESPIRATION RATE: 18 BRPM | HEART RATE: 80 BPM

## 2021-12-03 DIAGNOSIS — I87.2 VENOUS STASIS DERMATITIS OF RIGHT LOWER EXTREMITY: ICD-10-CM

## 2021-12-03 DIAGNOSIS — I89.0 LYMPHEDEMA OF RIGHT LOWER EXTREMITY: ICD-10-CM

## 2021-12-03 DIAGNOSIS — I83.019 VENOUS ULCER OF RIGHT LEG (HCC): Primary | ICD-10-CM

## 2021-12-03 DIAGNOSIS — L97.919 VENOUS ULCER OF RIGHT LEG (HCC): Primary | ICD-10-CM

## 2021-12-03 DIAGNOSIS — L97.212 NON-PRESSURE CHRONIC ULCER OF RIGHT CALF WITH FAT LAYER EXPOSED (HCC): ICD-10-CM

## 2021-12-03 PROCEDURE — 99213 OFFICE O/P EST LOW 20 MIN: CPT | Performed by: FAMILY MEDICINE

## 2021-12-08 ENCOUNTER — VBI (OUTPATIENT)
Dept: ADMINISTRATIVE | Facility: OTHER | Age: 61
End: 2021-12-08

## 2022-01-10 DIAGNOSIS — IMO0002 UNCONTROLLED DIABETES MELLITUS TYPE 2 WITH ATHEROSCLEROSIS OF ARTERIES OF EXTREMITIES: ICD-10-CM

## 2022-01-10 RX ORDER — INSULIN GLARGINE 100 [IU]/ML
80 INJECTION, SOLUTION SUBCUTANEOUS EVERY 12 HOURS SCHEDULED
Qty: 135 ML | Refills: 3 | Status: SHIPPED | OUTPATIENT
Start: 2022-01-10

## 2022-01-24 ENCOUNTER — VBI (OUTPATIENT)
Dept: ADMINISTRATIVE | Facility: OTHER | Age: 62
End: 2022-01-24

## 2022-01-24 NOTE — TELEPHONE ENCOUNTER
01/24/22 1:33 PM     See documentation in the [employer]       Already closed in [address] [place] [address]

## 2022-02-02 ENCOUNTER — VBI (OUTPATIENT)
Dept: ADMINISTRATIVE | Facility: OTHER | Age: 62
End: 2022-02-02

## 2022-02-02 NOTE — TELEPHONE ENCOUNTER
02/02/22 1:26 PM     See documentation in the [employer]       already closed in ANNY White

## 2022-03-23 ENCOUNTER — OFFICE VISIT (OUTPATIENT)
Dept: FAMILY MEDICINE CLINIC | Facility: CLINIC | Age: 62
End: 2022-03-23
Payer: COMMERCIAL

## 2022-03-23 VITALS — DIASTOLIC BLOOD PRESSURE: 88 MMHG | TEMPERATURE: 96.5 F | SYSTOLIC BLOOD PRESSURE: 118 MMHG

## 2022-03-23 DIAGNOSIS — Z00.00 MEDICARE ANNUAL WELLNESS VISIT, SUBSEQUENT: ICD-10-CM

## 2022-03-23 DIAGNOSIS — Z11.59 NEED FOR HEPATITIS C SCREENING TEST: ICD-10-CM

## 2022-03-23 DIAGNOSIS — IMO0002 UNCONTROLLED DIABETES MELLITUS TYPE 2 WITH ATHEROSCLEROSIS OF ARTERIES OF EXTREMITIES: Primary | ICD-10-CM

## 2022-03-23 DIAGNOSIS — Z13.6 SCREENING FOR CARDIOVASCULAR CONDITION: ICD-10-CM

## 2022-03-23 DIAGNOSIS — Z13.1 SCREENING FOR DIABETES MELLITUS: ICD-10-CM

## 2022-03-23 DIAGNOSIS — Z12.5 SCREENING FOR PROSTATE CANCER: ICD-10-CM

## 2022-03-23 PROCEDURE — G0439 PPPS, SUBSEQ VISIT: HCPCS | Performed by: FAMILY MEDICINE

## 2022-03-23 RX ORDER — ASPIRIN 81 MG/1
81 TABLET ORAL DAILY
COMMUNITY

## 2022-03-23 NOTE — PROGRESS NOTES
Assessment and Plan:     Problem List Items Addressed This Visit        Endocrine    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 ([facility] 75 ) - Primary    Relevant Orders    POCT hemoglobin A1c           Preventive health issues were discussed with patient, and age appropriate screening tests were ordered as noted in patient's After Visit Summary  Personalized health advice and appropriate referrals for health education or preventive services given if needed, as noted in patient's After Visit Summary       History of Present Illness:     Patient presents for Medicare Annual Wellness visit    Patient Care Team:  Jen Cleaning DO as PCP - General  Jen Cleaning DO as PCP - [address] [place], [place] [address] [facility] ([facility])     Problem List:     Patient Active Problem List   Diagnosis    Anxiety and depression    Diabetic neuropathy, type II diabetes mellitus ([place] [address] )    Hyperlipidemia    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 ([place] [address] )    Vitamin D deficiency    PAD (peripheral artery disease) ([facility] )    S/P BKA (below knee amputation), left ([place] [address] )    Orthostatic hypotension    Diabetic gastroparesis ([place] [address] )    Class 2 severe obesity due to excess calories with serious comorbidity and body mass index (BMI) of 35 0 to 35 9 in adult [facility])    Triple vessel coronary artery disease    Hypertensive heart disease with congestive heart failure (HCC)    Acute on chronic diastolic heart failure (HCC)    Other constipation    S/P CABG x 3    Diabetic autonomic neuropathy associated with type 2 diabetes mellitus (HCC)    Hyponatremia    Obstructive sleep apnea    Amputated below knee, left (HCC)    Phantom limb syndrome without pain (HCC)    CAD (coronary artery disease)    Chronic venous insufficiency    Nodular rash    Elephantiasis nostras verrucosa    Other acute osteomyelitis, left ankle and foot ([place] [address] )    Embolism and thrombosis of arteries of the lower extremities ([facility] )    Lymphedema of right lower extremity    Venous stasis dermatitis of right lower extremity    Abdominal distension      Past Medical and Surgical History:     Past Medical History:   Diagnosis Date    Anxiety     Anxiety and depression     Cataract     Congestive cardiac failure ([place] [address] )     Coronary artery disease     Depression     Diabetes mellitus ([place] [address] )     Diabetic autonomic neuropathy associated with type 2 diabetes mellitus ([place] [address] )     Last Assessed: 12/28/2017    History of DVT (deep vein thrombosis)     left LE    Hyperlipidemia     Lymphedema of right lower extremity     Obesity     Orthostatic hypotension      Past Surgical History:   Procedure Laterality Date    CORONARY ARTERY BYPASS GRAFT      EYE SURGERY      cataracts bilateral    LEG AMPUTATION THROUGH LOWER TIBIA AND FIBULA Left 8/3/2018    Procedure: AMPUTATION BELOW KNEE (BKA) L BKA;  Surgeon: Rebeca Hernández MD;  [facility];  Service: Vascular    WI CABG, ARTERY-VEIN, FOUR N/A 3/28/2018    Procedure: CORONARY ARTERY BYPASS GRAFT (CABG) x3 VESSELS, LIMA TO LAD, SVG--> PDA, SVG--> OM2,  RIGHT LEG EVH/SVH TO , INTRA-OP AMANDEEP;  Surgeon: Rhae Qiu MD;  [facility];  Service: Cardiac Surgery    ROTATOR CUFF REPAIR Bilateral       Family History:     Family History   Problem Relation Age of Onset    Atrial fibrillation Mother     Stroke Mother     Hypertension Father     Heart attack Paternal Grandfather 61    Diabetes Maternal Grandmother     Diabetes Maternal Grandfather     Diabetes Maternal Aunt     Diabetes Maternal Uncle       Social History:     Social History     Socioeconomic History    Marital status: /Civil Union     Spouse name: None    Number of children: None    Years of education: None    Highest education level: None   Occupational History    None   Tobacco Use    Smoking status: Former Smoker     Packs/day: 1 00     Years: 12 00     Pack years: 12 00 Types: Cigarettes     Quit date: 3/23/1994     Years since quittin 0    Smokeless tobacco: Never Used   Vaping Use    Vaping Use: Never used   Substance and Sexual Activity    Alcohol use: No     Comment: rarely    Drug use: No    Sexual activity: Not Currently   Other Topics Concern    None   Social History Narrative    None     Social Determinants of Health     Financial Resource Strain: Not on file   Food Insecurity: Not on file   Transportation Needs: Not on file   Physical Activity: Not on file   Stress: Not on file   Social Connections: Not on file   Intimate Partner Violence: Not on file   Housing Stability: Not on file      Medications and Allergies:     Current Outpatient Medications   Medication Sig Dispense Refill    ammonium lactate (LAC-HYDRIN) 12 % lotion Apply twice daily or with dressing changes  400 g 1    atorvastatin (LIPITOR) 80 mg tablet TAKE 1 TABLET BY MOUTH  DAILY 90 tablet 0    betamethasone dipropionate (DIPROSONE) 0 05 % cream Apply to reddened areas with each dressing change   30 g 1    cholecalciferol (VITAMIN D3) 1,000 units tablet Take 1,000 Units by mouth daily      clopidogrel (PLAVIX) 75 mg tablet TAKE 1 TABLET BY MOUTH  DAILY 90 tablet 0    finasteride (PROSCAR) 5 mg tablet TAKE 1 TABLET BY MOUTH  DAILY 90 tablet 0    furosemide (LASIX) 40 mg tablet TAKE 1 TABLET BY MOUTH  TWICE DAILY 180 tablet 0    glucose blood test strip ReliOn Prime Meter      HumaLOG KwikPen 100 units/mL injection pen INJECT 15 UNITS  SUBCUTANEOUSLY 3 TIMES  DAILY WITH MEALS 45 mL 3    Lantus SoloStar 100 units/mL injection pen Inject 80 Units under the skin every 12 (twelve) hours 135 mL 3    metoclopramide (REGLAN) 5 mg tablet TAKE 1 TABLET BY MOUTH  DAILY 90 tablet 0    pantoprazole (PROTONIX) 40 mg tablet TAKE 1 TABLET BY MOUTH  DAILY IN THE EARLY MORNING 90 tablet 0    tamsulosin (FLOMAX) 0 4 mg TAKE 1 CAPSULE BY MOUTH  DAILY WITH DINNER 90 capsule 0    aspirin (ECOTRIN LOW STRENGTH) 81 mg EC tablet Take 81 mg by mouth daily (Patient not taking: Reported on 3/23/2022 )      furosemide (LASIX) 40 mg tablet Take 1 tablet (40 mg total) by mouth 2 (two) times a day (Patient not taking: Reported on 3/23/2022 ) 60 tablet 0     No current facility-administered medications for this visit  Allergies   Allergen Reactions    Metformin      Allergy listed on nursing home paperwork      Immunizations:     Immunization History   Administered Date(s) Administered    COVID-19 MODERNA VACC 0 5 ML IM 03/30/2021, 04/30/2021    H1N1, All Formulations 11/22/2009    INFLUENZA 03/02/2017, 03/02/2017, 01/18/2018, 10/17/2018    Influenza, recombinant, quadrivalent,injectable, preservative free 09/24/2019, 10/29/2020    Pneumococcal Polysaccharide PPV23 11/03/2014      Health Maintenance:         Topic Date Due    Colorectal Cancer Screening  Never done    HIV Screening  Discontinued    Hepatitis C Screening  Discontinued         Topic Date Due    Influenza Vaccine (1) 09/01/2021    COVID-19 Vaccine (3 - Booster for Moderna series) 09/30/2021      Medicare Health Risk Assessment:     /88 (BP Location: Right arm, Patient Position: Sitting, Cuff Size: Standard)   Temp (!) 96 5 °F (35 8 °C) (Tympanic)      [place] is here for his Subsequent Wellness visit  Health Risk Assessment:   Patient rates overall health as fair  Patient feels that their physical health rating is slightly worse  Patient is satisfied with their life  Eyesight was rated as slightly worse  Hearing was rated as same  Patient feels that their emotional and mental health rating is same  Patients states they are never, rarely angry  Patient states they are never, rarely unusually tired/fatigued  Pain experienced in the last 7 days has been some  Patient's pain rating has been 1/10  Patient states that he has experienced no weight loss or gain in last 6 months       Depression Screening:   PHQ-9 Score: 0      Fall Risk Screening: In the past year, patient has experienced: no history of falling in past year      Home Safety:  Patient has working smoke alarms and has working carbon monoxide detector  Home safety hazards include: none  Nutrition:   Current diet is Regular  Medications:   Patient is currently taking over-the-counter supplements  OTC medications include: see medication list  Patient is able to manage medications  Activities of Daily Living (ADLs)/Instrumental Activities of Daily Living (IADLs):   Walk and transfer into and out of bed and chair?: No  Dress and groom yourself?: Yes    Bathe or shower yourself?: Yes    Feed yourself?  Yes  Do your laundry/housekeeping?: No  Manage your money, pay your bills and track your expenses?: No  Make your own meals?: No    Do your own shopping?: No    Advance Care Planning:   Living will: No    Durable POA for healthcare: No    Advanced directive: No      Cognitive Screening:   Provider or family/friend/caregiver concerned regarding cognition?: No    PREVENTIVE SCREENINGS      Cardiovascular Screening:    General: Screening Not Indicated, History Lipid Disorder, Risks and Benefits Discussed and Screening Current    Due for: Lipid Panel      Diabetes Screening:     General: Screening Not Indicated, History Diabetes, Risks and Benefits Discussed and Screening Current      Colorectal Cancer Screening:     General: Risks and Benefits Discussed and Patient Declines      Prostate Cancer Screening:    General: Risks and Benefits Discussed    Due for: PSA      Osteoporosis Screening:    General: Risks and Benefits Discussed and Screening Not Indicated      Abdominal Aortic Aneurysm (AAA) Screening:    Risk factors include: tobacco use        General: Risks and Benefits Discussed and Screening Not Indicated      Lung Cancer Screening:     General: Screening Not Indicated and Risks and Benefits Discussed      Hepatitis C Screening:    General: Risks and Benefits Discussed Hep C Screening Accepted: Yes      Other Counseling Topics:   Regular weightbearing exercise         Beti Hahn,

## 2022-03-23 NOTE — PATIENT INSTRUCTIONS

## 2022-06-16 DIAGNOSIS — I50.22 CHRONIC SYSTOLIC CONGESTIVE HEART FAILURE (HCC): ICD-10-CM

## 2022-06-16 RX ORDER — METOCLOPRAMIDE 5 MG/1
TABLET ORAL
Qty: 90 TABLET | Refills: 0 | Status: SHIPPED | OUTPATIENT
Start: 2022-06-16 | End: 2022-07-18

## 2022-06-16 RX ORDER — FUROSEMIDE 40 MG/1
TABLET ORAL
Qty: 180 TABLET | Refills: 3 | Status: SHIPPED | OUTPATIENT
Start: 2022-06-16

## 2022-08-18 ENCOUNTER — TELEPHONE (OUTPATIENT)
Dept: FAMILY MEDICINE CLINIC | Facility: CLINIC | Age: 62
End: 2022-08-18

## 2022-08-19 ENCOUNTER — TELEPHONE (OUTPATIENT)
Dept: FAMILY MEDICINE CLINIC | Facility: CLINIC | Age: 62
End: 2022-08-19

## 2022-08-19 NOTE — TELEPHONE ENCOUNTER
Patient was notified today that the above form was completed and faxed back to fax # [phone] today and original form is at the

## 2022-09-29 ENCOUNTER — VBI (OUTPATIENT)
Dept: ADMINISTRATIVE | Facility: OTHER | Age: 62
End: 2022-09-29

## 2022-10-31 NOTE — ASSESSMENT & PLAN NOTE
· Patient evaluated by vascular surgery, unsuccessful angiographic left popliteal recanalization with IR on March 19  · Patient will be scheduled for femoropopliteal bypass when he recovered from CABG, possibly within a month from cardiac surgery  · Vascular surgery team has signed off 90

## 2022-11-18 ENCOUNTER — VBI (OUTPATIENT)
Dept: ADMINISTRATIVE | Facility: OTHER | Age: 62
End: 2022-11-18

## 2023-01-10 ENCOUNTER — VBI (OUTPATIENT)
Dept: ADMINISTRATIVE | Facility: OTHER | Age: 63
End: 2023-01-10

## 2023-01-24 ENCOUNTER — VBI (OUTPATIENT)
Dept: ADMINISTRATIVE | Facility: OTHER | Age: 63
End: 2023-01-24

## 2023-01-24 DIAGNOSIS — I70.90 ATHEROSCLEROSIS: ICD-10-CM

## 2023-01-24 RX ORDER — INSULIN GLARGINE 100 [IU]/ML
80 INJECTION, SOLUTION SUBCUTANEOUS EVERY 12 HOURS SCHEDULED
Qty: 135 ML | Refills: 3 | OUTPATIENT
Start: 2023-01-24

## 2023-01-27 ENCOUNTER — RA CDI HCC (OUTPATIENT)
Dept: OTHER | Facility: HOSPITAL | Age: 63
End: 2023-01-27

## 2023-01-27 NOTE — PROGRESS NOTES
[facility] 75  coding opportunities     E11 51, I[address] 0 and [address] 9     Chart Reviewed number of suggestions sent to Provider: 3     Patients Insurance     Medicare Insurance: [employer] Advantage

## 2023-02-02 ENCOUNTER — OFFICE VISIT (OUTPATIENT)
Dept: FAMILY MEDICINE CLINIC | Facility: CLINIC | Age: 63
End: 2023-02-02

## 2023-02-02 VITALS
SYSTOLIC BLOOD PRESSURE: 118 MMHG | DIASTOLIC BLOOD PRESSURE: 68 MMHG | TEMPERATURE: 97.8 F | OXYGEN SATURATION: 98 % | HEART RATE: 86 BPM

## 2023-02-02 DIAGNOSIS — R33.9 URINARY RETENTION: ICD-10-CM

## 2023-02-02 DIAGNOSIS — E11.40 TYPE 2 DIABETES MELLITUS WITH DIABETIC NEUROPATHY, WITH LONG-TERM CURRENT USE OF INSULIN (HCC): Primary | ICD-10-CM

## 2023-02-02 DIAGNOSIS — I73.9 PAD (PERIPHERAL ARTERY DISEASE) (HCC): ICD-10-CM

## 2023-02-02 DIAGNOSIS — Z79.4 TYPE 2 DIABETES MELLITUS WITH DIABETIC NEUROPATHY, WITH LONG-TERM CURRENT USE OF INSULIN (HCC): Primary | ICD-10-CM

## 2023-02-02 DIAGNOSIS — I83.019 VENOUS ULCER OF RIGHT LEG (HCC): ICD-10-CM

## 2023-02-02 DIAGNOSIS — L97.212 NON-PRESSURE CHRONIC ULCER OF RIGHT CALF WITH FAT LAYER EXPOSED (HCC): ICD-10-CM

## 2023-02-02 DIAGNOSIS — L97.919 VENOUS ULCER OF RIGHT LEG (HCC): ICD-10-CM

## 2023-02-02 DIAGNOSIS — I50.22 CHRONIC SYSTOLIC CONGESTIVE HEART FAILURE (HCC): ICD-10-CM

## 2023-02-02 DIAGNOSIS — Z89.512 S/P BKA (BELOW KNEE AMPUTATION), LEFT (HCC): ICD-10-CM

## 2023-02-02 DIAGNOSIS — Z95.1 S/P CABG (CORONARY ARTERY BYPASS GRAFT): ICD-10-CM

## 2023-02-02 DIAGNOSIS — E11.43 DIABETIC GASTROPARESIS (HCC): Chronic | ICD-10-CM

## 2023-02-02 DIAGNOSIS — I11.0 HYPERTENSIVE HEART DISEASE WITH CONGESTIVE HEART FAILURE, UNSPECIFIED HEART FAILURE TYPE (HCC): ICD-10-CM

## 2023-02-02 DIAGNOSIS — M86.172 OTHER ACUTE OSTEOMYELITIS, LEFT ANKLE AND FOOT (HCC): ICD-10-CM

## 2023-02-02 DIAGNOSIS — E66.01 CLASS 2 SEVERE OBESITY DUE TO EXCESS CALORIES WITH SERIOUS COMORBIDITY AND BODY MASS INDEX (BMI) OF 35.0 TO 35.9 IN ADULT (HCC): ICD-10-CM

## 2023-02-02 DIAGNOSIS — K31.84 DIABETIC GASTROPARESIS (HCC): Chronic | ICD-10-CM

## 2023-02-02 LAB — SL AMB POCT HEMOGLOBIN AIC: 6.7 (ref ?–6.5)

## 2023-02-02 RX ORDER — TRIAMCINOLONE ACETONIDE 1 MG/G
CREAM TOPICAL 2 TIMES DAILY
Qty: 453 G | Refills: 3 | Status: SHIPPED | OUTPATIENT
Start: 2023-02-02

## 2023-02-02 RX ORDER — METOCLOPRAMIDE 5 MG/1
5 TABLET ORAL DAILY
Qty: 90 TABLET | Refills: 1 | Status: SHIPPED | OUTPATIENT
Start: 2023-02-02

## 2023-02-02 RX ORDER — INSULIN GLARGINE 100 [IU]/ML
90 INJECTION, SOLUTION SUBCUTANEOUS 2 TIMES DAILY
Qty: 135 ML | Refills: 3 | Status: SHIPPED | OUTPATIENT
Start: 2023-02-02

## 2023-02-02 RX ORDER — PANTOPRAZOLE SODIUM 40 MG/1
40 TABLET, DELAYED RELEASE ORAL
Qty: 90 TABLET | Refills: 1 | Status: SHIPPED | OUTPATIENT
Start: 2023-02-02

## 2023-02-02 RX ORDER — FINASTERIDE 5 MG/1
5 TABLET, FILM COATED ORAL DAILY
Qty: 90 TABLET | Refills: 1 | Status: SHIPPED | OUTPATIENT
Start: 2023-02-02

## 2023-02-02 RX ORDER — ATORVASTATIN CALCIUM 80 MG/1
80 TABLET, FILM COATED ORAL DAILY
Qty: 90 TABLET | Refills: 1 | Status: SHIPPED | OUTPATIENT
Start: 2023-02-02

## 2023-02-02 RX ORDER — TAMSULOSIN HYDROCHLORIDE 0.4 MG/1
0.4 CAPSULE ORAL
Qty: 90 CAPSULE | Refills: 1 | Status: SHIPPED | OUTPATIENT
Start: 2023-02-02

## 2023-02-02 RX ORDER — FUROSEMIDE 40 MG/1
40 TABLET ORAL 2 TIMES DAILY
Qty: 180 TABLET | Refills: 1 | Status: SHIPPED | OUTPATIENT
Start: 2023-02-02

## 2023-02-02 RX ORDER — CLOPIDOGREL BISULFATE 75 MG/1
75 TABLET ORAL DAILY
Qty: 90 TABLET | Refills: 1 | Status: SHIPPED | OUTPATIENT
Start: 2023-02-02

## 2023-02-02 NOTE — PROGRESS NOTES
Assessment/Plan: A1c 6 8  Other previous labs reviewed  Patient has difficulty getting out of the house given BKA/wheelchair needed  Patient states having flu shot as well as COVID booster  Patient will continue with current regimen for chronic conditions listed  Diabetes stable, blood pressure, CHF, CAD stable  Refills given at this time  Patient will use moisturizing lotion as well as triamcinolone for dry skin right lower extremity  Patient will follow-up virtually in 6 months       Diagnoses and all orders for this visit:    Type 2 diabetes mellitus with diabetic neuropathy, with long-term current use of insulin ([facility])  -     POCT hemoglobin A1c  -     IRIS Diabetic eye exam  -     Insulin Glargine Solostar (Lantus SoloStar) 100 UNIT/ML SOPN; Inject 0 9 mL (90 Units total) under the skin 2 (two) times a day    Chronic systolic congestive heart failure ([facility])  -     atorvastatin (LIPITOR) 80 mg tablet; Take 1 tablet (80 mg total) by mouth daily  -     furosemide (LASIX) 40 mg tablet; Take 1 tablet (40 mg total) by mouth 2 (two) times a day  -     metoclopramide (REGLAN) 5 mg tablet; Take 1 tablet (5 mg total) by mouth daily    S/P CABG (coronary artery bypass graft)  -     clopidogrel (PLAVIX) 75 mg tablet; Take 1 tablet (75 mg total) by mouth daily  -     tamsulosin (FLOMAX) 0 4 mg; Take 1 capsule (0 4 mg total) by mouth daily with dinner    Urinary retention  -     finasteride (PROSCAR) 5 mg tablet; Take 1 tablet (5 mg total) by mouth daily    S/P BKA (below knee amputation), left ([facility])  -     pantoprazole (PROTONIX) 40 mg tablet;  Take 1 tablet (40 mg total) by mouth daily in the early morning    Diabetic gastroparesis ([facility])    PAD (peripheral artery disease) ([facility])  -     triamcinolone (KENALOG) 0 1 % cream; Apply topically 2 (two) times a day    Hypertensive heart disease with congestive heart failure, unspecified heart failure type ([facility] )    Non-pressure chronic ulcer of right calf with fat layer exposed [facility])    Other acute osteomyelitis, left ankle and foot ([address] )    Venous ulcer of right leg ([facility])  -     triamcinolone (KENALOG) 0 1 % cream; Apply topically 2 (two) times a day    Class 2 severe obesity due to excess calories with serious comorbidity and body mass index (BMI) of 35 0 to 35 9 in adult [facility])            Subjective:        Patient ID: Derenda Goldmann is a 60 y o  male  Patient is here to follow-up on diabetes, congestive heart failure, CAD, gastroparesis, PAD hypertension  Patient without any chest pain or shortness of breath or palpitation or syncope or dizziness  No new abdominal pain or problems urinating or defecating  Patient status post BKA left leg  Right leg without open sores or wounds per patient  Patient does note dryness to the right lower extremity  Patient will need medications refilled  The following portions of the patient's history were reviewed and updated as appropriate: allergies, current medications, past family history, past medical history, past social history, past surgical history and problem list       Review of Systems   Constitutional: Negative  HENT: Negative  Eyes: Positive for visual disturbance  Respiratory: Negative  Cardiovascular: Positive for leg swelling  Gastrointestinal: Negative  Endocrine: Negative  Genitourinary: Negative  Musculoskeletal: Positive for gait problem and myalgias  Skin: Positive for color change  Allergic/Immunologic: Negative  Hematological: Negative  Psychiatric/Behavioral: Negative  Objective:      BMI Counseling: There is no height or weight on file to calculate BMI  Follow-up plan was not completed due to patient refusing BMI follow-up plan               /68 (BP Location: Left arm, Patient Position: Sitting, Cuff Size: Standard)   Pulse 86   Temp 97 8 °F (36 6 °C) (Temporal)   SpO2 98%          Physical Exam

## 2023-02-02 NOTE — PATIENT INSTRUCTIONS
Type 2 Diabetes Management for Adults   AMBULATORY CARE:   Type 2 diabetes  is a disease that affects how your body uses glucose (sugar)  Either your body cannot make enough insulin, or it cannot use the insulin correctly  It is important to keep diabetes controlled to prevent damage to your heart, blood vessels, and other organs  Have someone call your local emergency number (911 in the [address] [facility]) if:   · You cannot be woken  · You have signs of diabetic ketoacidosis:     ? confusion, fatigue    ? vomiting    ? rapid heartbeat    ? fruity smelling breath    ? extreme thirst    ? dry mouth and skin    · You have any of the following signs of a heart attack:      ? Squeezing, pressure, or pain in your chest    ? You may  also have any of the following:     § Discomfort or pain in your back, neck, jaw, stomach, or arm    § Shortness of breath    § Nausea or vomiting    § Lightheadedness or a sudden cold sweat    · You have any of the following signs of a stroke:      ? Numbness or drooping on one side of your face     ? Weakness in an arm or leg    ? Confusion or difficulty speaking    ? Dizziness, a severe headache, or vision loss    Call your doctor or diabetes care team if:   · You have a sore or wound that will not heal     · You have a change in the amount you urinate  · Your blood sugar levels are higher than your target goals  · You often have lower blood sugar levels than your target goals  · Your skin is red, dry, warm, or swollen  · You have trouble coping with diabetes, or you feel anxious or depressed  · You have questions or concerns about your condition or care  What you need to know about high blood sugar levels:  High blood sugar levels may not cause any symptoms  You may feel more thirsty or urinate more often than usual  Over time, high blood sugar levels can damage your nerves, blood vessels, tissues, and organs   The following can increase your blood sugar levels:  · Large meals or large amounts of carbohydrates at one time    · Less physical activity    · Stress    · Illness    · A lower dose of medicine or insulin, or a late dose    What you need to know about low blood sugar levels: You can prevent symptoms such as shakiness, dizziness, irritability, or confusion by preventing your blood sugar levels from going too low  · Treat a low blood sugar level right away  ? Drink 4 ounces of juice or have 1 tube of glucose gel  ? Check your blood sugar level again 10 to 15 minutes later  ? When the level goes back to normal, eat a meal or snack to prevent another decrease  · Keep glucose gel, raisins, or hard candy with you at all times to treat a low blood sugar level  · Your blood sugar level can get too low if you take diabetes medicine or insulin and do not eat enough food  · If you use insulin, check your blood sugar level before you exercise  ? If your blood sugar level is below 100 mg/dL, eat 4 crackers or 2 ounces of raisins, or drink 4 ounces of juice  ? Check your level every 30 minutes if you exercise more than 1 hour  ? You may need a snack during or after exercise  What you can do to manage your blood sugar levels:   · Check your blood sugar levels as directed and as needed  Several items are available to use to check your levels  You may need to check by testing a drop of blood in a glucose monitor  You may instead be given a continuous glucose monitoring (CGM) device  The device is worn at all times  The CGM checks your blood sugar level every 5 minutes  It sends results to an electronic device such as a smart phone  A CGM can be used with or without an insulin pump  Talk with your provider to find out which method is best for you  The goal for blood sugar levels before meals  is between 80 and 130 mg/dL and 2 hours after eating  is lower than 180 mg/dL  · Make healthy food choices    Work with a dietitian to develop a meal plan that works for you and your schedule  A dietitian can help you learn how to eat the right amount of carbohydrates during your meals and snacks  Carbohydrates can raise your blood sugar level if you eat too many at one time  Examples of foods that contain carbohydrates are breads, cereals, rice, pasta, and sweets  · Get regular physical activity  Physical activity can help you get to your target blood sugar level goal and manage your weight  Get at least 150 minutes of moderate to vigorous aerobic physical activity each week  Do not miss more than 2 days in a row  Do not sit longer than 30 minutes at a time  Your healthcare provider can help you create an activity plan  The plan can include the best activities for you and can help you build your strength and endurance  · Maintain a healthy weight  Ask your healthcare provider what a healthy weight is for you  Ask him or her to help you create a safe weight loss plan if you are overweight  · Take your diabetes medicine or insulin as directed  You may need diabetes medicine, insulin, or both to help control your blood sugar levels  Your healthcare provider will teach you how and when to take your diabetes medicine or insulin  You will also be taught about side effects oral diabetes medicine can cause  Insulin may be injected, or given through a pump or pen  You and your care team will discuss which method is best for you  ? An insulin pump  is an implanted device that gives your insulin 24 hours a day  An insulin pump prevents the need for multiple insulin injections in a day  ? An insulin pen  is a device prefilled with the right amount of insulin  ? You and your family members will be taught how to draw up and give insulin  if this is the best method for you  Your education team will also teach you how to dispose of needles and syringes  ? You will learn how much insulin you need  and when to give it   You will be taught when not to give insulin  You will also be taught what to do if your blood sugar level drops too low  This may happen if you take insulin and do not eat the right amount of carbohydrates  Other things you can do to manage type 2 diabetes:   · Wear medical alert identification  Wear medical alert jewelry or carry a card that says you have diabetes  Ask your provider where to get these items  · Do not smoke  Nicotine and other chemicals in cigarettes and cigars can cause lung and blood vessel damage  It also makes it more difficult to manage your diabetes  Ask your provider for information if you currently smoke and need help to quit  Do not use e-cigarettes or smokeless tobacco in place of cigarettes or to help you quit  They still contain nicotine  · Check your feet each day for cuts, scratches, calluses, or other wounds  Look for redness and swelling, and feel for warmth  Wear shoes that fit well  Check your shoes for rocks or other objects that can hurt your feet  Do not walk barefoot or wear shoes without socks  Wear cotton socks to help keep your feet dry  · Ask about vaccines you may need  You have a higher risk for serious illness if you get the flu, pneumonia, COVID-19, or hepatitis  Ask your provider if you should get vaccines to prevent these or other diseases, and when to get the vaccines  · Talk to your care team if you become stressed about diabetes care  Sometimes being able to fit diabetes care into your life can cause increased stress  The stress can cause you not to take care of yourself properly  Your care team can help by offering tips about self-care  Your care team may suggest you talk to a mental health provider  The provider can listen and offer help with self-care issues  Follow up with your doctor or diabetes care team as directed: You may need to have blood tests done before your follow-up visit   The test results will show if changes need to be made in your treatment or self-care  Write down your questions so you remember to ask them during your visits  Talk to your provider if you cannot afford your medicine  © Copyright [employer] 2022 Information is for End User's use only and may not be sold, redistributed or otherwise used for commercial purposes  All illustrations and images included in CareNotes® are the copyrighted property of [employer]  or Lyndsay Wu   The above information is an  only  It is not intended as medical advice for individual conditions or treatments  Talk to your doctor, nurse or pharmacist before following any medical regimen to see if it is safe and effective for you

## 2023-04-17 ENCOUNTER — HOSPITAL ENCOUNTER (INPATIENT)
Facility: HOSPITAL | Age: 63
LOS: 15 days | Discharge: NURSING, REHAB OR LONG-TERM CARE | End: 2023-05-02
Attending: EMERGENCY MEDICINE | Admitting: INTERNAL MEDICINE

## 2023-04-17 ENCOUNTER — APPOINTMENT (EMERGENCY)
Dept: RADIOLOGY | Facility: HOSPITAL | Age: 63
End: 2023-04-17

## 2023-04-17 DIAGNOSIS — E11.40 TYPE 2 DIABETES MELLITUS WITH DIABETIC NEUROPATHY, WITH LONG-TERM CURRENT USE OF INSULIN (HCC): ICD-10-CM

## 2023-04-17 DIAGNOSIS — I73.9 PAD (PERIPHERAL ARTERY DISEASE) (HCC): ICD-10-CM

## 2023-04-17 DIAGNOSIS — S81.801A LEG WOUND, RIGHT, INITIAL ENCOUNTER: Primary | ICD-10-CM

## 2023-04-17 DIAGNOSIS — I95.1 ORTHOSTATIC HYPOTENSION: ICD-10-CM

## 2023-04-17 DIAGNOSIS — M79.89 RIGHT LEG SWELLING: ICD-10-CM

## 2023-04-17 DIAGNOSIS — Z79.4 TYPE 2 DIABETES MELLITUS WITH DIABETIC NEUROPATHY, WITH LONG-TERM CURRENT USE OF INSULIN (HCC): ICD-10-CM

## 2023-04-17 LAB
ANION GAP SERPL CALCULATED.3IONS-SCNC: 9 MMOL/L (ref 4–13)
BASOPHILS # BLD AUTO: 0.08 THOUSANDS/ΜL (ref 0–0.1)
BASOPHILS NFR BLD AUTO: 1 % (ref 0–1)
BUN SERPL-MCNC: 18 MG/DL (ref 5–25)
CALCIUM SERPL-MCNC: 8.8 MG/DL (ref 8.4–10.2)
CHLORIDE SERPL-SCNC: 97 MMOL/L (ref 96–108)
CO2 SERPL-SCNC: 26 MMOL/L (ref 21–32)
CREAT SERPL-MCNC: 1.08 MG/DL (ref 0.6–1.3)
CRP SERPL QL: 79.5 MG/L
EOSINOPHIL # BLD AUTO: 0.16 THOUSAND/ΜL (ref 0–0.61)
EOSINOPHIL NFR BLD AUTO: 1 % (ref 0–6)
ERYTHROCYTE [DISTWIDTH] IN BLOOD BY AUTOMATED COUNT: 13.9 % (ref 11.6–15.1)
ERYTHROCYTE [SEDIMENTATION RATE] IN BLOOD: 82 MM/HOUR (ref 0–19)
GFR SERPL CREATININE-BSD FRML MDRD: 72 ML/MIN/1.73SQ M
GLUCOSE SERPL-MCNC: 190 MG/DL (ref 65–140)
HCT VFR BLD AUTO: 41.7 % (ref 36.5–49.3)
HGB BLD-MCNC: 13.1 G/DL (ref 12–17)
IMM GRANULOCYTES # BLD AUTO: 0.05 THOUSAND/UL (ref 0–0.2)
IMM GRANULOCYTES NFR BLD AUTO: 0 % (ref 0–2)
LYMPHOCYTES # BLD AUTO: 1.81 THOUSANDS/ΜL (ref 0.6–4.47)
LYMPHOCYTES NFR BLD AUTO: 14 % (ref 14–44)
MCH RBC QN AUTO: 27.5 PG (ref 26.8–34.3)
MCHC RBC AUTO-ENTMCNC: 31.4 G/DL (ref 31.4–37.4)
MCV RBC AUTO: 88 FL (ref 82–98)
MONOCYTES # BLD AUTO: 0.82 THOUSAND/ΜL (ref 0.17–1.22)
MONOCYTES NFR BLD AUTO: 6 % (ref 4–12)
NEUTROPHILS # BLD AUTO: 10.12 THOUSANDS/ΜL (ref 1.85–7.62)
NEUTS SEG NFR BLD AUTO: 78 % (ref 43–75)
NRBC BLD AUTO-RTO: 0 /100 WBCS
PLATELET # BLD AUTO: 277 THOUSANDS/UL (ref 149–390)
PMV BLD AUTO: 10.2 FL (ref 8.9–12.7)
POTASSIUM SERPL-SCNC: 5.4 MMOL/L (ref 3.5–5.3)
RBC # BLD AUTO: 4.76 MILLION/UL (ref 3.88–5.62)
SODIUM SERPL-SCNC: 132 MMOL/L (ref 135–147)
WBC # BLD AUTO: 13.04 THOUSAND/UL (ref 4.31–10.16)

## 2023-04-18 ENCOUNTER — APPOINTMENT (INPATIENT)
Dept: NON INVASIVE DIAGNOSTICS | Facility: HOSPITAL | Age: 63
End: 2023-04-18

## 2023-04-18 ENCOUNTER — APPOINTMENT (INPATIENT)
Dept: MRI IMAGING | Facility: HOSPITAL | Age: 63
End: 2023-04-18

## 2023-04-18 PROBLEM — L03.115 CELLULITIS OF RIGHT ANKLE: Status: ACTIVE | Noted: 2023-04-18

## 2023-04-18 LAB
GLUCOSE SERPL-MCNC: 158 MG/DL (ref 65–140)
GLUCOSE SERPL-MCNC: 175 MG/DL (ref 65–140)
GLUCOSE SERPL-MCNC: 184 MG/DL (ref 65–140)
GLUCOSE SERPL-MCNC: 224 MG/DL (ref 65–140)

## 2023-04-18 RX ORDER — INSULIN GLARGINE 100 [IU]/ML
60 INJECTION, SOLUTION SUBCUTANEOUS EVERY 12 HOURS SCHEDULED
Status: DISCONTINUED | OUTPATIENT
Start: 2023-04-18 | End: 2023-04-21

## 2023-04-18 RX ORDER — CLOPIDOGREL BISULFATE 75 MG/1
75 TABLET ORAL DAILY
Status: DISCONTINUED | OUTPATIENT
Start: 2023-04-18 | End: 2023-05-02 | Stop reason: HOSPADM

## 2023-04-18 RX ORDER — ASPIRIN 81 MG/1
81 TABLET ORAL DAILY
Status: DISCONTINUED | OUTPATIENT
Start: 2023-04-18 | End: 2023-05-02 | Stop reason: HOSPADM

## 2023-04-18 RX ORDER — INSULIN LISPRO 100 [IU]/ML
15 INJECTION, SOLUTION INTRAVENOUS; SUBCUTANEOUS
Status: DISCONTINUED | OUTPATIENT
Start: 2023-04-18 | End: 2023-04-18

## 2023-04-18 RX ORDER — TAMSULOSIN HYDROCHLORIDE 0.4 MG/1
0.4 CAPSULE ORAL
Status: DISCONTINUED | OUTPATIENT
Start: 2023-04-18 | End: 2023-05-02 | Stop reason: HOSPADM

## 2023-04-18 RX ORDER — ATORVASTATIN CALCIUM 80 MG/1
80 TABLET, FILM COATED ORAL
Status: DISCONTINUED | OUTPATIENT
Start: 2023-04-18 | End: 2023-05-02 | Stop reason: HOSPADM

## 2023-04-18 RX ORDER — INSULIN GLARGINE 100 [IU]/ML
80 INJECTION, SOLUTION SUBCUTANEOUS EVERY 12 HOURS SCHEDULED
Status: DISCONTINUED | OUTPATIENT
Start: 2023-04-18 | End: 2023-04-18

## 2023-04-18 RX ORDER — CEFAZOLIN SODIUM 2 G/50ML
2000 SOLUTION INTRAVENOUS EVERY 8 HOURS
Status: COMPLETED | OUTPATIENT
Start: 2023-04-18 | End: 2023-04-20

## 2023-04-18 RX ORDER — ACETAMINOPHEN 325 MG/1
650 TABLET ORAL EVERY 6 HOURS PRN
Status: DISCONTINUED | OUTPATIENT
Start: 2023-04-18 | End: 2023-05-02 | Stop reason: HOSPADM

## 2023-04-18 RX ORDER — INSULIN LISPRO 100 [IU]/ML
2-12 INJECTION, SOLUTION INTRAVENOUS; SUBCUTANEOUS
Status: DISCONTINUED | OUTPATIENT
Start: 2023-04-18 | End: 2023-05-02 | Stop reason: HOSPADM

## 2023-04-18 RX ORDER — MAGNESIUM HYDROXIDE/ALUMINUM HYDROXICE/SIMETHICONE 120; 1200; 1200 MG/30ML; MG/30ML; MG/30ML
30 SUSPENSION ORAL EVERY 6 HOURS PRN
Status: DISCONTINUED | OUTPATIENT
Start: 2023-04-18 | End: 2023-05-02 | Stop reason: HOSPADM

## 2023-04-18 RX ORDER — PANTOPRAZOLE SODIUM 40 MG/1
40 TABLET, DELAYED RELEASE ORAL
Status: DISCONTINUED | OUTPATIENT
Start: 2023-04-18 | End: 2023-05-02 | Stop reason: HOSPADM

## 2023-04-18 RX ORDER — ONDANSETRON 2 MG/ML
4 INJECTION INTRAMUSCULAR; INTRAVENOUS EVERY 6 HOURS PRN
Status: DISCONTINUED | OUTPATIENT
Start: 2023-04-18 | End: 2023-04-22

## 2023-04-18 RX ORDER — FINASTERIDE 5 MG/1
5 TABLET, FILM COATED ORAL DAILY
Status: DISCONTINUED | OUTPATIENT
Start: 2023-04-18 | End: 2023-05-02 | Stop reason: HOSPADM

## 2023-04-18 RX ORDER — POLYETHYLENE GLYCOL 3350 17 G/17G
17 POWDER, FOR SOLUTION ORAL DAILY
Status: DISCONTINUED | OUTPATIENT
Start: 2023-04-18 | End: 2023-05-02 | Stop reason: HOSPADM

## 2023-04-18 RX ORDER — ENOXAPARIN SODIUM 100 MG/ML
40 INJECTION SUBCUTANEOUS DAILY
Status: DISCONTINUED | OUTPATIENT
Start: 2023-04-18 | End: 2023-05-02 | Stop reason: HOSPADM

## 2023-04-18 RX ORDER — FUROSEMIDE 40 MG/1
40 TABLET ORAL 2 TIMES DAILY
Status: DISCONTINUED | OUTPATIENT
Start: 2023-04-18 | End: 2023-05-02 | Stop reason: HOSPADM

## 2023-04-18 RX ADMIN — INSULIN LISPRO 2 UNITS: 100 INJECTION, SOLUTION INTRAVENOUS; SUBCUTANEOUS at 08:13

## 2023-04-18 RX ADMIN — ENOXAPARIN SODIUM 40 MG: 100 INJECTION SUBCUTANEOUS at 08:16

## 2023-04-18 RX ADMIN — INSULIN LISPRO 4 UNITS: 100 INJECTION, SOLUTION INTRAVENOUS; SUBCUTANEOUS at 16:32

## 2023-04-18 RX ADMIN — FUROSEMIDE 40 MG: 40 TABLET ORAL at 16:31

## 2023-04-18 RX ADMIN — CLOPIDOGREL BISULFATE 75 MG: 75 TABLET ORAL at 08:09

## 2023-04-18 RX ADMIN — PANTOPRAZOLE SODIUM 40 MG: 40 TABLET, DELAYED RELEASE ORAL at 06:05

## 2023-04-18 RX ADMIN — ASPIRIN 81 MG: 81 TABLET, COATED ORAL at 08:09

## 2023-04-18 RX ADMIN — TAMSULOSIN HYDROCHLORIDE 0.4 MG: 0.4 CAPSULE ORAL at 16:31

## 2023-04-18 RX ADMIN — FINASTERIDE 5 MG: 5 TABLET, FILM COATED ORAL at 08:09

## 2023-04-18 RX ADMIN — INSULIN GLARGINE 60 UNITS: 100 INJECTION, SOLUTION SUBCUTANEOUS at 22:04

## 2023-04-18 RX ADMIN — CEFAZOLIN SODIUM 2000 MG: 2 SOLUTION INTRAVENOUS at 14:13

## 2023-04-18 RX ADMIN — CEFAZOLIN SODIUM 2000 MG: 2 SOLUTION INTRAVENOUS at 22:02

## 2023-04-18 RX ADMIN — INSULIN GLARGINE 80 UNITS: 100 INJECTION, SOLUTION SUBCUTANEOUS at 08:09

## 2023-04-18 RX ADMIN — FUROSEMIDE 40 MG: 40 TABLET ORAL at 08:09

## 2023-04-18 RX ADMIN — CEFAZOLIN SODIUM 2000 MG: 2 SOLUTION INTRAVENOUS at 06:06

## 2023-04-18 NOTE — PHYSICAL THERAPY NOTE
PHYSICAL THERAPY NOTE          Patient Name: Ori Buckley  ETWHN'C Date: 4/18/2023 04/18/23 1615   PT Last Visit   PT Visit Date 04/18/23   Note Type   Note type Evaluation; Cancelled Session   Cancel Reasons Other   Additional Comments PT consult received  Chart reviewed  Pt admitted for R foot/ankle injury w/ pending lead study and MRI of lower extremity  Will continue to follow as appropriate       Jeff Louise

## 2023-04-18 NOTE — ASSESSMENT & PLAN NOTE
Presents with RLE foot wound, compression stocking tourniquet around R ankle with exposed tendon, cellulitic changes  Diffdx includes: cellulitis, osteomyelitis, chronic venous stasis    VS: afebrile, normotensive, on room air    Labs: CBC mild leukocytosis, BMP K 5 4 hemolyzed, CRP 79 5, ESR 82    Imaging: XR ankle, no acute osseous abnormalities await rads read     Plan:    Admit to medicine with podiatry consult    Podiatry recommending: local wound care, MRI ankle, LEADs, no empiric tx for osteomyelitis    Will start Ancef for cellulitic changes of LE, narrow as appropriate    Follow-up: fever/wbc curve, MRI   Diet: carb control sodium restriction    Lines/Drains/Tubes: peripheral IV   Consults: podiatry   Case discussed with ED resident and decision was made for hospital admission  Imaging and labs reviewed by me, final interpretation provided by radiology or VRADs  Chart review completed through [facility] or Care Everywhere for PMHx, medications, ongoing treatments, or other pertinent information

## 2023-04-18 NOTE — UTILIZATION REVIEW
Initial Clinical Review    Admission: Date/Time/Statement:   Admission Orders (From admission, onward)     Ordered        04/17/23 2233  INPATIENT ADMISSION  Once                      Orders Placed This Encounter   Procedures    INPATIENT ADMISSION     Standing Status:   Standing     Number of Occurrences:   1     Order Specific Question:   Level of Care     Answer:   Med Surg [16]     Order Specific Question:   Estimated length of stay     Answer:   More than 2 Midnights     Order Specific Question:   Certification     Answer:   I certify that inpatient services are medically necessary for this patient for a duration of greater than two midnights  See H&P and MD Progress Notes for additional information about the patient's course of treatment  ED Arrival Information     Expected   -    Arrival   4/17/2023 18:33    Acuity   Urgent            Means of arrival   Walk-In    Escorted by   Family Member    Service   Hospitalist    Admission type   Emergency            Arrival complaint   toe pain/bleeding           Chief Complaint   Patient presents with    Toe Injury     Pt c/o injuring R great toe this afternoon  Pt reports nail has been bleeding  Swelling noted to pts entire R leg that pt reports has been present for 2 years  Pt reports he is unsure of injury to toe because he has no feeling in R leg due to neuropathy and L leg is Prosthetic  Hx of DM, CHF       Initial Presentation: 60 y o  male presents to ED from home with right  Toe pain, bleeding since the afternoon of admission after hitting it  Has chronic  Lower extremity edema and neuropathy, unable to feel injury,  Noticed blood  Additional  PMH  Is  HTN, CHF, DM2, CKD and BKA  Uses walker and scooter at baseline  Has not  Followed with podiatry or wound care in years  No abnormalities noted on  Xray  Admit  Ip w ith  Cellulitis  Right ankle and plan is  Podiatry consult,   NEGRITO,  Monitor labs, MRI and continue home  Meds        Podiatry consult  Can be  WBAT  In surgical shoe  Wait  MRI to R/O osteo  Continue local wound care  RLE  LEADS pending  Right pre tibial    Date:   4/18       Day 2:   Likely  Requiring surgical intervention  Continue  NEGRITO  Monitor  BP  Waiting LEADS/MRI  ED Triage Vitals   Temperature Pulse Respirations Blood Pressure SpO2   04/17/23 1856 04/17/23 1856 04/17/23 1856 04/17/23 1856 04/17/23 1856   98 8 °F (37 1 °C) 89 18 135/63 97 %      Temp Source Heart Rate Source Patient Position - Orthostatic VS BP Location FiO2 (%)   04/17/23 1856 04/17/23 1856 04/17/23 1856 04/17/23 1856 --   Oral Monitor Sitting Right arm       Pain Score       04/17/23 2109       No Pain          Wt Readings from Last 1 Encounters:   04/17/23 125 kg (275 lb 9 2 oz)     Additional Vital Signs:   97 4 °F (36 3 °C) Abnormal  88 17 151/84 106 90 % None (Room air) Lying    04/18/23 0616 -- 83 20 130/64 -- 95 % None (Room air) Sitting   04/18/23 0301 -- 83 18 133/68 -- 95 % None (Room air) Lying   04/17/23 2240 -- 90 18 133/63 -- 93 % None (Room air) Sitting   04/17/23 2109 -- 89 18 141/62 -- 97 % None (Room air) Lying   04/17/23 1856 98 8 °F (37 1 °C) 89 18 135/63 90 97 % None (Room air) Sitting       Pertinent Labs/Diagnostic Test Results:   XR ankle 3+ views RIGHT   ED Interpretation by Franco Lainez DO (04/17 2149)   No acute fractures      Final Result by Garry Sommer MD (04/18 7629)      No acute osseous abnormality  Workstation performed: [id number]         XR foot 3+ views RIGHT   ED Interpretation by Franco Lainez DO (04/17 2147)   Big toe fracture distal end      Final Result by Garry Sommer MD (04/18 8467)      Nondisplaced fracture, terminal tuft of the right 1st toe  Findings concur with the referring clinician's preliminary interpretation already in the patient's electronic health record          Workstation performed: [id number]         VAS lower limb arterial duplex, limited, unilateral    (Results Pending)   MRI ankle/heel right wo contrast    (Results Pending)         Results from last 7 days   Lab Units 04/17/23  2138   WBC Thousand/uL 13 04*   HEMOGLOBIN g/dL 13 1   HEMATOCRIT % 41 7   PLATELETS Thousands/uL 277   NEUTROS ABS Thousands/µL 10 12*         Results from last 7 days   Lab Units 04/17/23  2138   SODIUM mmol/L 132*   POTASSIUM mmol/L 5 4*   CHLORIDE mmol/L 97   CO2 mmol/L 26   ANION GAP mmol/L 9   BUN mg/dL 18   CREATININE mg/dL 1 08   EGFR ml/min/1 73sq m 72   CALCIUM mg/dL 8 8         Results from last 7 days   Lab Units 04/18/23  1110 04/18/23  0725   POC GLUCOSE mg/dl 184* 175*     Results from last 7 days   Lab Units 04/17/23  2138   GLUCOSE RANDOM mg/dL 190*               Results from last 7 days   Lab Units 04/17/23  2138   CRP mg/L 79 5*   SED RATE mm/hour 82*                 ED Treatment:   Medication Administration from 04/17/2023 1833 to 04/18/2023 1004       Date/Time Order Dose Route Action Comments     04/18/2023 0809 EDT aspirin (ECOTRIN LOW STRENGTH) EC tablet 81 mg 81 mg Oral Given --     04/18/2023 0809 EDT clopidogrel (PLAVIX) tablet 75 mg 75 mg Oral Given --     04/18/2023 0809 EDT finasteride (PROSCAR) tablet 5 mg 5 mg Oral Given --     04/18/2023 0809 EDT furosemide (LASIX) tablet 40 mg 40 mg Oral Given --     04/18/2023 0809 EDT insulin glargine (LANTUS) subcutaneous injection 80 Units 0 8 mL 80 Units Subcutaneous Given --     04/18/2023 0605 EDT pantoprazole (PROTONIX) EC tablet 40 mg 40 mg Oral Given --     04/18/2023 0810 EDT polyethylene glycol (MIRALAX) packet 17 g 17 g Oral Not Given --     04/18/2023 0816 EDT enoxaparin (LOVENOX) subcutaneous injection 40 mg 40 mg Subcutaneous Given --     04/18/2023 0813 EDT insulin lispro (HumaLOG) 100 units/mL subcutaneous injection 2-12 Units 2 Units Subcutaneous Given --     04/18/2023 0704 EDT ceFAZolin (ANCEF) IVPB (premix in dextrose) 2,000 mg 50 mL 0 mg Intravenous Stopped --     04/18/2023 0606 EDT ceFAZolin (ANCEF) IVPB (premix in dextrose) 2,000 mg 50 mL 2,000 mg Intravenous New Bag --        Present on Admission:   Chronic venous insufficiency   Diabetic autonomic neuropathy associated with type 2 diabetes mellitus (HCC)   Chronic diastolic heart failure (HCC)   CAD (coronary artery disease)      Admitting Diagnosis: Toe pain [M79 676]  Leg wound, right, initial encounter [S81 801A]  Right leg swelling [M79 89]  Age/Sex: 60 y o  male  Admission Orders:  Scheduled Medications:  aspirin, 81 mg, Oral, Daily  atorvastatin, 80 mg, Oral, After Dinner  cefazolin, 2,000 mg, Intravenous, Q8H  clopidogrel, 75 mg, Oral, Daily  enoxaparin, 40 mg, Subcutaneous, Daily  finasteride, 5 mg, Oral, Daily  furosemide, 40 mg, Oral, BID  insulin glargine, 60 Units, Subcutaneous, Q12H [place]  insulin lispro, 2-12 Units, Subcutaneous, 4x Daily (AC & HS)  pantoprazole, 40 mg, Oral, Early Morning  polyethylene glycol, 17 g, Oral, Daily  tamsulosin, 0 4 mg, Oral, Daily With Dinner      Continuous IV Infusions:     PRN Meds:  acetaminophen, 650 mg, Oral, Q6H PRN  aluminum-magnesium hydroxide-simethicone, 30 mL, Oral, Q6H PRN  ondansetron, 4 mg, Intravenous, Q6H PRN      Network Utilization Review Department  ATTENTION: Please call with any questions or concerns to [phone] and carefully listen to the prompts so that you are directed to the right person  All voicemails are confidential   Deloria Cornea all requests for admission clinical reviews, approved or denied determinations and any other requests to dedicated fax number below belonging to the campus where the patient is receiving treatment   List of dedicated fax numbers for the Facilities:  [address] DENIALS (Administrative/Medical Necessity) [phone]   [address] (Maternity/NICU/Pediatrics) 261 [facility] [address] [place] [address] [address] [facility] [address] [facility] [address] [place] [place] [address] [phone]

## 2023-04-18 NOTE — ED PROVIDER NOTES
History  Chief Complaint   Patient presents with    Toe Injury     Pt c/o injuring R great toe this afternoon  Pt reports nail has been bleeding  Swelling noted to pts entire R leg that pt reports has been present for 2 years  Pt reports he is unsure of injury to toe because he has no feeling in R leg due to neuropathy and L leg is Prosthetic  Hx of DM, CHF     Patient is a 79-year-old male with past medical history significant for type 2 diabetes status post BKA on left for osteomyelitis, CHF, CAD post CABG presenting to the emergency department for evaluation of right lower extremity wound  Patient states he is unsure what happened today but noticed that his toe was bleeding  Patient has chronic lower extremity edema and swelling patient also notes that he has significant neuropathy has been unable to feel anything  He denies any fevers or chills chest pain shortness of breath headache dizziness tinnitus vision change abdominal pain nausea vomiting diarrhea or constipation  Patient has not seen a podiatrist and has not seen wound care in years  Patient does ambulate with a walker  Prior to Admission Medications   Prescriptions Last Dose Informant Patient Reported? Taking? HumaLOG KwikPen 100 units/mL injection pen   No No   Sig: INJECT 15 UNITS  SUBCUTANEOUSLY 3 TIMES  DAILY WITH MEALS   Insulin Glargine Solostar (Lantus SoloStar) 100 UNIT/ML SOPN   No No   Sig: Inject 0 9 mL (90 Units total) under the skin 2 (two) times a day   Lantus SoloStar 100 units/mL injection pen   No No   Sig: Inject 80 Units under the skin every 12 (twelve) hours   ammonium lactate (LAC-HYDRIN) 12 % lotion   No No   Sig: Apply twice daily or with dressing changes     aspirin (ECOTRIN LOW STRENGTH) 81 mg EC tablet   Yes No   Sig: Take 81 mg by mouth daily   Patient not taking: Reported on 3/23/2022   atorvastatin (LIPITOR) 80 mg tablet   No No   Sig: Take 1 tablet (80 mg total) by mouth daily   betamethasone dipropionate (DIPROSONE) 0 05 % cream   No No   Sig: Apply to reddened areas with each dressing change     cholecalciferol (VITAMIN D3) 1,000 units tablet   Yes No   Sig: Take 1,000 Units by mouth daily   clopidogrel (PLAVIX) 75 mg tablet   No No   Sig: Take 1 tablet (75 mg total) by mouth daily   finasteride (PROSCAR) 5 mg tablet   No No   Sig: Take 1 tablet (5 mg total) by mouth daily   furosemide (LASIX) 40 mg tablet   No No   Sig: Take 1 tablet (40 mg total) by mouth 2 (two) times a day   Patient not taking: Reported on 3/23/2022   furosemide (LASIX) 40 mg tablet   No No   Sig: Take 1 tablet (40 mg total) by mouth 2 (two) times a day   glucose blood test strip   Yes No   Sig: ReliOn Prime Meter   metoclopramide (REGLAN) 5 mg tablet   No No   Sig: Take 1 tablet (5 mg total) by mouth daily   pantoprazole (PROTONIX) 40 mg tablet   No No   Sig: Take 1 tablet (40 mg total) by mouth daily in the early morning   tamsulosin (FLOMAX) 0 4 mg   No No   Sig: Take 1 capsule (0 4 mg total) by mouth daily with dinner   triamcinolone (KENALOG) 0 1 % cream   No No   Sig: Apply topically 2 (two) times a day      Facility-Administered Medications: None       Past Medical History:   Diagnosis Date    Anxiety     Anxiety and depression     Cataract     Congestive cardiac failure (HCC)     Coronary artery disease     Depression     Diabetes mellitus ([facility] [address] [facility] )     Diabetic autonomic neuropathy associated with type 2 diabetes mellitus ([facility] [address] )     Last Assessed: 12/28/2017    History of DVT (deep vein thrombosis)     left LE    Hyperlipidemia     Lymphedema of right lower extremity     Obesity     Orthostatic hypotension        Past Surgical History:   Procedure Laterality Date    CORONARY ARTERY BYPASS GRAFT      EYE SURGERY      cataracts bilateral    LEG AMPUTATION THROUGH LOWER TIBIA AND FIBULA Left 8/3/2018    Procedure: AMPUTATION BELOW KNEE (BKA) L BKA;  Surgeon: Papi Catalan MD;  [facility]: [facility];  Service: Vascular    ND CORONARY ARTERY BYP W/VEIN & ARTERY GRAFT 4 VEIN N/A 3/28/2018    Procedure: CORONARY ARTERY BYPASS GRAFT (CABG) x3 VESSELS, LIMA TO LAD, SVG--> PDA, SVG--> OM2,  RIGHT LEG EVH/SVH TO , INTRA-OP AMANDEEP;  Surgeon: Sandra Grimaldo MD;  Location: [facility];  Service: Cardiac Surgery    ROTATOR CUFF REPAIR Bilateral        Family History   Problem Relation Age of Onset    Atrial fibrillation Mother     Stroke Mother     Hypertension Father     Heart attack Paternal Grandfather 61    Diabetes Maternal Grandmother     Diabetes Maternal Grandfather     Diabetes Maternal Aunt     Diabetes Maternal Uncle      I have reviewed and agree with the history as documented  E-Cigarette/Vaping    E-Cigarette Use Never User      E-Cigarette/Vaping Substances    Nicotine No     THC No     CBD No     Flavoring No     Other No     Unknown No      Social History     Tobacco Use    Smoking status: Former     Packs/day: 1 00     Years: 12 00     Pack years: 12 00     Types: Cigarettes     Quit date: 3/23/1994     Years since quittin 0    Smokeless tobacco: Never   Vaping Use    Vaping Use: Never used   Substance Use Topics    Alcohol use: No     Comment: rarely    Drug use: No        Review of Systems   Constitutional: Negative for chills and fever  Respiratory: Negative for chest tightness and shortness of breath  Cardiovascular: Negative for chest pain and palpitations  Gastrointestinal: Negative for abdominal pain, constipation, diarrhea, nausea and vomiting  Musculoskeletal: Negative for arthralgias and back pain  Skin: Positive for wound  Negative for color change and rash  Swelling to right lower extremity chronically   Neurological: Negative for weakness and headaches  All other systems reviewed and are negative        Physical Exam  ED Triage Vitals   Temperature Pulse Respirations Blood Pressure SpO2   23 1856 23 1856 23 1856 23 1856 23 185   98 8 °F (37 1 °C) 89 18 135/63 97 %      Temp Source Heart Rate Source Patient Position - Orthostatic VS BP Location FiO2 (%)   04/17/23 1856 04/17/23 1856 04/17/23 1856 04/17/23 1856 --   Oral Monitor Sitting Right arm       Pain Score       04/17/23 2109       No Pain             Orthostatic Vital Signs  Vitals:    04/17/23 1856 04/17/23 2109   BP: 135/63 141/62   Pulse: 89 89   Patient Position - Orthostatic VS: Sitting Lying       Physical Exam  Vitals and nursing note reviewed  Constitutional:       General: He is not in acute distress  Appearance: He is well-developed  HENT:      Head: Normocephalic and atraumatic  Right Ear: External ear normal       Left Ear: External ear normal       Nose: Nose normal       Mouth/Throat:      Mouth: Mucous membranes are moist    Eyes:      Extraocular Movements: Extraocular movements intact  Conjunctiva/sclera: Conjunctivae normal    Cardiovascular:      Rate and Rhythm: Normal rate and regular rhythm  Heart sounds: Normal heart sounds  No murmur heard  Pulmonary:      Effort: Pulmonary effort is normal  No respiratory distress  Breath sounds: Normal breath sounds  Abdominal:      Palpations: Abdomen is soft  Tenderness: There is no abdominal tenderness  There is no guarding  Musculoskeletal:         General: Swelling present  Cervical back: Normal range of motion and neck supple  Comments: Right lower extremity significant swelling as well as tourniquet appearing wound from chronic wraps as pictured below  Left Lower Extremity: Left leg is amputated below knee  Skin:     General: Skin is warm and dry  Capillary Refill: Capillary refill takes less than 2 seconds  Neurological:      Mental Status: He is alert  Psychiatric:         Mood and Affect: Mood normal           Media Information  Document Information    Clinical Image - Mobile Device      04/17/2023 20:55   Attached To:    Hospital Encounter on 4/17/23     Source Information    DO Rome Spear Ed      Media Information  Document Information    Clinical Image - Mobile Device      04/17/2023 20:56   Attached To: Hospital Encounter on 4/17/23     Source Information    DO Rome Guzman Ed        Media Information  Document Information    Clinical Image - Mobile Device      04/17/2023 20:57   Attached To:    Hospital Encounter on 4/17/23     Source Information    DO Rome Guzman Ed       ED Medications  Medications - No data to display    Diagnostic Studies  Results Reviewed     Procedure Component Value Units Date/Time    Basic metabolic panel [[id number]]  (Abnormal) Collected: 04/17/23 2138    Lab Status: Final result Specimen: Blood from Arm, Right Updated: 04/17/23 2215     Sodium 132 mmol/L      Potassium 5 4 mmol/L      Chloride 97 mmol/L      CO2 26 mmol/L      ANION GAP 9 mmol/L      BUN 18 mg/dL      Creatinine 1 08 mg/dL      Glucose 190 mg/dL      Calcium 8 8 mg/dL      eGFR 72 ml/min/1 73sq m     Narrative:      [place] guidelines for Chronic Kidney Disease (CKD):     Stage 1 with normal or high GFR (GFR > 90 mL/min/1 73 square meters)    Stage 2 Mild CKD (GFR = 60-89 mL/min/1 73 square meters)    Stage 3A Moderate CKD (GFR = 45-59 mL/min/1 73 square meters)    Stage 3B Moderate CKD (GFR = 30-44 mL/min/1 73 square meters)    Stage 4 Severe CKD (GFR = 15-29 mL/min/1 73 square meters)    Stage 5 End Stage CKD (GFR <15 mL/min/1 73 square meters)  Note: GFR calculation is accurate only with a steady state creatinine    Sedimentation rate, automated [[id number]]  (Abnormal) Collected: 04/17/23 2138    Lab Status: Final result Specimen: Blood from Arm, Right Updated: 04/17/23 2156     Sed Rate 82 mm/hour     CBC and differential [[id number]]  (Abnormal) Collected: 04/17/23 2138    Lab Status: Final result Specimen: Blood from Arm, Right Updated: 04/17/23 2153     WBC 13 04 Thousand/uL      RBC 4 76 Million/uL      Hemoglobin 13 1 g/dL      Hematocrit 41 7 %      MCV 88 fL      MCH 27 5 pg      MCHC 31 4 g/dL      RDW 13 9 %      MPV 10 2 fL      Platelets 071 Thousands/uL      nRBC 0 /100 WBCs      Neutrophils Relative 78 %      Immat GRANS % 0 %      Lymphocytes Relative 14 %      Monocytes Relative 6 %      Eosinophils Relative 1 %      Basophils Relative 1 %      Neutrophils Absolute 10 12 Thousands/µL      Immature Grans Absolute 0 05 Thousand/uL      Lymphocytes Absolute 1 81 Thousands/µL      Monocytes Absolute 0 82 Thousand/µL      Eosinophils Absolute 0 16 Thousand/µL      Basophils Absolute 0 08 Thousands/µL     Wound culture and Gram stain [[id number]] Collected: 04/17/23 2139    Lab Status: In process Specimen: Wound from Foot, Right Updated: 04/17/23 2150    C-reactive protein [[id number]] Collected: 04/17/23 2138    Lab Status: In process Specimen: Blood from Arm, Right Updated: 04/17/23 2150                 XR ankle 3+ views RIGHT   ED Interpretation by Sarah Gold DO (04/17 2149)   No acute fractures      XR foot 3+ views RIGHT   ED Interpretation by Sarah Gold DO (04/17 2147)   Big toe fracture distal end            Procedures  Procedures      ED Course  ED Course as of 04/17/23 2236 Mon Apr 17, 2023 2052 Blood Pressure: 135/63   2052 Temperature: 98 8 °F (37 1 °C)   2052 Temp Source: Oral   2052 Pulse: 89   2052 Respirations: 18   2052 SpO2: 97 %   2113 Patient is a 58-year-old male presenting to the emergency department for evaluation of right lower extremity wound  Patient is unsure when the last time he changed the dressings were states that likely was years ago  Patient denies any fevers or chills any new numbness or tingling or pain in the joint  After taking down the wraps significant wounds down to the muscle showing the vessels  Patient is able to move his foot but is unable to feel anything below the knee    Patient does have a wound on his toe that was bleeding which shows some blood flow to the foot  I immediately reached out to podiatry with pictures and any recommendations  Will evaluate patient with CBC, BMP, CRP, ESR, CRP, wound Gram stain and culture as well as x-rays of the foot and ankle  Patient is aware he will be likely staying in the hospital   Podiatry states that they are at [facility] and will get back to me in a bit  Patient is aware is no questions or concerns we will frequently reevaluate  2213 Sed Rate(!): 82   2214 WBC(!): 13 04   2226 Potassium(!): 5 4  Slightly hemolyzed  [address] reached out to request slim admission; they also said no need to empirically treat for osteo at this time  36 Reached out to [facility] for admission  MDM      Disposition  Final diagnoses:   Leg wound, right, initial encounter   Right leg swelling     Time reflects when diagnosis was documented in both MDM as applicable and the Disposition within this note     Time User Action Codes Description Comment    4/17/2023 10:33 PM Kojo Bazzi Add [S81 801A] Leg wound, right, initial encounter     4/17/2023 10:33 PM Kojo Bazzi Add [M79 89] Right leg swelling       ED Disposition     ED Disposition   Admit    Condition   Stable    Date/Time   Mon Apr 17, 2023 10:33 PM    Comment   Case was discussed admited to Dr Cheryl Theodore    None         Patient's Medications   Discharge Prescriptions    No medications on file     No discharge procedures on file  PDMP Review     None           ED Provider  Attending physically available and evaluated Vickie Walters  I managed the patient along with the ED Attending      Electronically Signed by         Andres Arriaga DO  04/17/23 7316

## 2023-04-18 NOTE — CONSULTS
Podiatry - Consultation    Patient Information:   Luis Rosado 60 y o  male MRN: [id number]  Unit/Bed#: ED-27 Encounter: [id number]  PCP: Terra Castro DO  Date of Admission:  4/17/2023  Date of Consultation: 04/17/23  Requesting Physician: Andres Mobley Pe*      ASSESSMENT:    Luis Rosado is a 60 y o  male with:    1  Right ankle wound with cellulitis  2  Right great toe wound secondary to injury  3  H/o left BKA  4  PAD  5  Uncontrolled Type 2 Diabetes  6  RLE lymphedema     PLAN:    · Right foot dressings changed, right ankle wound with exposed tendon and significant edema, no purulence, no crepitus  · Right ankle MRI pending to r/o osteomyelitis  · Local wound care consisting of maxorb/DSD on ankle and betadine MISTY great toe  Wound care instructions placed  · Right ankle and foot XRs reviewed, per my personal read, negative for osteomyelitis right ankle, with no VITOR or acute osseous abnormalities  I hope this actually the girl like yearnoldo  · LEADs pending to assess RLE healing potential, r/o proximal stenosis  · Elevation on green foam wedges or pillows when non-ambulatory  · Rest of care per primary team   · Will discuss this plan with my attending and update as needed  Weightbearing status: as tolerated in surgical shoe    SUBJECTIVE:    History of Present Illness:    Luis Rosado is a 60 y o  male who is originally admitted 4/17/2023 due to significant right ankle wound and lymphedema  Patient has a past medical history of left BKA, significant neuropathy, uncontrolled diabetes type 2, peripheral arterial disease, coronary artery disease, severe obesity, and lymphedema  We are consulted for right ankle wound and right great toe wound  Patient states he has been wearing his compression sock for 3 weeks without changing dressing  He believes the compression sock got bunched up around his ankle for at least 2 weeks and did not notice any drainage or blood from compression dressing    He states that earlier today he stubbed his toe and noticed blood on his right big toe which is what brought him into the urgent care  While at urgent care they stated they could not treat diabetic wounds and recommended he go to [place] [address] emergency department  Upon presentation to ED, compression dressings were removed and revealed a severe ankle wound probing down to tendon  Patient denies any nausea, vomiting, fever, chills, chest pain or shortness of breath at this time  He states he has been feeling fine and eating well  He does report significant neuropathy up to his knee  Review of Systems:    Constitutional: Negative  HENT: Negative  Eyes: Negative  Respiratory: Negative  Cardiovascular: Negative  Gastrointestinal: Negative  Musculoskeletal: left bka  Skin: right ankle wound right great toe wound  Neurological: Numbness and tingling right lower extremity  Psych: Negative       Past Medical and Surgical History:     Past Medical History:   Diagnosis Date    Anxiety     Anxiety and depression     Cataract     [facility]     Coronary artery disease     Depression     Diabetes mellitus [facility]     Diabetic autonomic neuropathy [facility] with type [facility]     Last Assessed: 12/28/2017    History of DVT (deep vein thrombosis)     left LE    Hyperlipidemia     Lymphedema of right lower extremity     Obesity     Orthostatic hypotension        Past Surgical History:   Procedure Laterality Date    CORONARY ARTERY BYPASS GRAFT      EYE SURGERY      cataracts bilateral    LEG AMPUTATION THROUGH LOWER TIBIA AND FIBULA Left 8/3/2018    Procedure: AMPUTATION BELOW KNEE (BKA) L BKA;  Surgeon: Jessi Ventura MD;  [facility];  Service: Vascular    PA CORONARY ARTERY BYP W/VEIN & ARTERY GRAFT 4 VEIN N/A 3/28/2018    Procedure: CORONARY ARTERY BYPASS GRAFT (CABG) x3 VESSELS, LIMA TO LAD, SVG--> PDA, SVG--> OM2,  RIGHT LEG EVH/SVH TO , INTRA-OP "AMANDEEP;  Surgeon: Gaetano Heimlich, MD;  [facility];  Service: Cardiac Surgery    ROTATOR CUFF REPAIR Bilateral        Meds/Allergies:    (Not in a hospital admission)      Allergies   Allergen Reactions    Metformin      Allergy listed on nursing home paperwork       Social History:     Marital Status: /Civil Union    Substance Use History:   Social History     Substance and Sexual Activity   Alcohol Use No    Comment: rarely     Social History     Tobacco Use   Smoking Status Former    Packs/day: 1 00    Years: 12 00    Pack years: 12 00    Types: Cigarettes    Quit date: 3/23/1994    Years since quittin 0   Smokeless Tobacco Never     Social History     Substance and Sexual Activity   Drug Use No       Family History:    Family History   Problem Relation Age of Onset    Atrial fibrillation Mother     Stroke Mother     Hypertension Father     Heart attack Paternal Grandfather 61    Diabetes Maternal Grandmother     Diabetes Maternal Grandfather     Diabetes Maternal Aunt     Diabetes Maternal Uncle          OBJECTIVE:    Vitals:   Blood Pressure: 133/63 (23)  Pulse: 90 (23)  Temperature: 98 8 °F (37 1 °C) (23)  Temp Source: Oral (23)  Respirations: 18 (23)  Height: 6' 1\" (185 4 cm) (23)  Weight - Scale: 125 kg (275 lb 9 2 oz) (23)  SpO2: 93 % (23)    Physical Exam:    General Appearance: Alert, cooperative, no distress  HEENT: Head normocephalic, atraumatic, without obvious abnormality  Heart: Normal rate and rhythm  Lungs: Non-labored breathing  No respiratory distress  Abdomen: Without distension  Psychiatric: AAOx3  Lower Extremity:  Vascular:   Right DP and PT pulses are monophasic by Doppler  CRT < 3 seconds at the digits  +4/4 edema noted at right lower extremities  Pedal hair is absent  Skin temperature is WNL RLE  Musculoskeletal:  MMT is 4/5 in all muscle compartments RLE    No " "Pain on palpation of RLE  Left BKA noted  Dermatological:  Lower extremity wound(s) as noted below:    Wound #: 1  Location: right ankle circumferential   Length 16cm: Width 5cm: Depth 2cm:   Deepest Tissue Noted in Base: tendon  Probe to Bone:no  Peripheral Skin Description: Attached  Granulation: 100% Fibrotic Tissue: 0% Necrotic Tissue: 0%   Drainage Amount: moderate, serosanguinous  Signs of Infection: Yes  Surrounding edema and cellulitis    Right great toe superficial abrasion, without signs of active infection: no purulence, no malodor, no ascending erythema, no crepitus, no fluctuance  Neurological:  Gross sensation is absent  Protective sensation is absent  Patient Reports numbness and/or paresthesias  Clinical Images 04/17/23: Additional data:     Lab Results: I have personally reviewed pertinent labs including:    Results from last 7 days   Lab Units 04/17/23  2138   WBC Thousand/uL 13 04*   HEMOGLOBIN g/dL 13 1   HEMATOCRIT % 41 7   PLATELETS Thousands/uL 277   NEUTROS PCT % 78*   LYMPHS PCT % 14   MONOS PCT % 6   EOS PCT % 1     Results from last 7 days   Lab Units 04/17/23  2138   POTASSIUM mmol/L 5 4*   CHLORIDE mmol/L 97   CO2 mmol/L 26   BUN mg/dL 18   CREATININE mg/dL 1 08   CALCIUM mg/dL 8 8                       Imaging: I have personally reviewed pertinent reports in PACS  EKG, Pathology, and Other Studies: I have personally reviewed pertinent reports  ** Please Note: Portions of the record may have been created with voice recognition software  Occasional wrong word or \"sound a like\" substitutions may have occurred due to the inherent limitations of voice recognition software  Read the chart carefully and recognize, using context, where substitutions have occurred   **  "

## 2023-04-18 NOTE — H&P
[facility]  H&P  Name: Rashida Obrien 60 y o  male I MRN: [id number]  Unit/Bed#: ED-27 I Date of Admission: 4/17/2023   Date of Service: 4/18/2023 I Hospital Day: 1      Assessment/Plan   * Cellulitis of right ankle  Assessment & Plan  Presents with RLE foot wound, compression stocking tourniquet around R ankle with exposed tendon, cellulitic changes  Diffdx includes: cellulitis, osteomyelitis, chronic venous stasis    VS: afebrile, normotensive, on room air    Labs: CBC mild leukocytosis, BMP K 5 4 hemolyzed, CRP 79 5, ESR 82    Imaging: XR ankle, no acute osseous abnormalities await rads read     Plan:    Admit to medicine with podiatry consult    Podiatry recommending: local wound care, MRI ankle, LEADs, no empiric tx for osteomyelitis    Will start Ancef for cellulitic changes of LE, narrow as appropriate    Follow-up: fever/wbc curve, MRI   Diet: carb control sodium restriction    Lines/Drains/Tubes: peripheral IV   Consults: podiatry   Case discussed with ED resident and decision was made for hospital admission  Imaging and labs reviewed by me, final interpretation provided by radiology or VRADs  Chart review completed through [facility] or Care Everywhere for PMHx, medications, ongoing treatments, or other pertinent information      CAD (coronary artery disease)  Assessment & Plan  No chest pain on admission, maintained on plavix 75mg daily     Chronic diastolic heart failure (HCC)  Assessment & Plan  Wt Readings from Last 3 Encounters:   04/17/23 125 kg (275 lb 9 2 oz)   09/09/21 127 kg (280 lb)   06/09/21 127 kg (280 lb)     Appears euvolemic on admission, chronic lymphedema   TTE 70/9064: LV systolic function normal  Dry Weight: ~280lbs    Plan:   Beta-blocker: none   Diuretic: lasix 40mg twice daily    ACE/ARB: none   Nitrates/antianginal: none   Monitor daily weight, I/Os, cardiac diet (sodium restriction), monitor K>4, Mg>2     VTE Pharmacologic Prophylaxis: VTE Score: 3 Moderate Risk (Score 3-4) - Pharmacological DVT Prophylaxis Ordered: enoxaparin (Lovenox)  Code Status: Level 1 - Full Code   Discussion with family: update in am      Anticipated Length of Stay: Patient will be admitted on an inpatient basis with an anticipated length of stay of greater than 2 midnights secondary to cellultiis   Total Time Spent on Date of Encounter in care of patient: 75 minutes This time was spent on one or more of the following: performing physical exam; counseling and coordination of care; obtaining or reviewing history; documenting in the medical record; reviewing/ordering tests, medications or procedures; communicating with other healthcare professionals and discussing with patient's family/caregivers  Chief Complaint: toe pain    History of Present Illness:  Shaun Manuel is a 60 y o  male with a PMH of CHF, HTN, CAD, HEMA, T2DM, CKD who presents with toe pain  History obtained from patient without limitation  He presents this evening after hitting his R toe resulting in nail avulsion and bleeding  He has chronic lower extremity edema and neuropathy so he could not feel the injury occur but just looked down and saw his toe was bleeding  He ambulates with walker/scooter but has a difficult time after his BKA  Has not seen podiatry or wound care in years  Tried using compression stockings but they have not helped  Denies further complaints and was enjoying the Get[employer]ippy game  Review of Systems:  Review of Systems   Constitutional: Negative for chills and fever  Respiratory: Negative for cough and shortness of breath  Cardiovascular: Positive for leg swelling  Negative for chest pain and palpitations  Gastrointestinal: Negative for abdominal pain and vomiting  Musculoskeletal: Negative for arthralgias and back pain  Skin: Negative for color change and rash  Neurological: Negative for syncope  All other systems reviewed and are negative        Past Medical and Surgical History:   Past Medical History:   Diagnosis Date    Anxiety     Anxiety and depression     Cataract     [facility]     Coronary artery disease     Depression     Diabetes [facility]     Diabetic autonomic neuropathy associated with type 2 diabetes mellitus ([facility] )     Last Assessed: 12/28/2017    History of DVT (deep vein thrombosis)     left LE    Hyperlipidemia     Lymphedema of right lower extremity     Obesity     Orthostatic hypotension        Past Surgical History:   Procedure Laterality Date    CORONARY ARTERY BYPASS GRAFT      EYE SURGERY      cataracts bilateral    LEG AMPUTATION THROUGH LOWER TIBIA AND FIBULA Left 8/3/2018    Procedure: AMPUTATION BELOW KNEE (BKA) L BKA;  Surgeon: Roberto Carlos Cornejo MD;  [facility];  Service: Vascular    ID CORONARY ARTERY BYP W/VEIN & ARTERY GRAFT 4 VEIN N/A 3/28/2018    Procedure: CORONARY ARTERY BYPASS GRAFT (CABG) x3 VESSELS, LIMA TO LAD, SVG--> PDA, SVG--> OM2,  RIGHT LEG EVH/SVH TO , INTRA-OP AMANDEEP;  Surgeon: Savannah Tijerina MD;  [facility];  Service: Cardiac Surgery    ROTATOR CUFF REPAIR Bilateral        Meds/Allergies:  Prior to Admission medications    Medication Sig Start Date End Date Taking?  Authorizing Provider   atorvastatin (LIPITOR) 80 mg tablet Take 1 tablet (80 mg total) by mouth daily 2/2/23  Yes Amanda Sotomayor, DO   cholecalciferol (VITAMIN D3) 1,000 units tablet Take 1,000 Units by mouth daily   Yes Historical Provider, MD   clopidogrel (PLAVIX) 75 mg tablet Take 1 tablet (75 mg total) by mouth daily 2/2/23  Yes Amanda Sotomayor, DO   finasteride (PROSCAR) 5 mg tablet Take 1 tablet (5 mg total) by mouth daily 2/2/23  Yes Amanda Sotomayor, DO   furosemide (LASIX) 40 mg tablet Take 1 tablet (40 mg total) by mouth 2 (two) times a day 2/2/23  Yes Amanda Sotomayor, DO   glucose blood test strip ReliOn Prime Meter   Yes Historical Provider, MD   HumaLOG KwikPen 100 units/mL injection pen INJECT 15 UNITS SUBCUTANEOUSLY 3 TIMES  DAILY WITH MEALS 7/18/22  Yes Jovani Galvan DO   Insulin Glargine Solostar (Lantus SoloStar) 100 UNIT/ML SOPN Inject 0 9 mL (90 Units total) under the skin 2 (two) times a day 2/2/23  Yes Jovani Galvan DO   Lantus SoloStar 100 units/mL injection pen Inject 80 Units under the skin every 12 (twelve) hours 1/10/22  Yes Jovani Galvan DO   metoclopramide (REGLAN) 5 mg tablet Take 1 tablet (5 mg total) by mouth daily 2/2/23  Yes Jovani Galvan DO   pantoprazole (PROTONIX) 40 mg tablet Take 1 tablet (40 mg total) by mouth daily in the early morning 2/2/23  Yes Jovani Galvan DO   tamsulosin [facility]) 0 4 mg Take 1 capsule (0 4 mg total) by mouth daily with dinner 2/2/23  Yes Jovani Galvan DO   aspirin (ECOTRIN LOW STRENGTH) 81 mg EC tablet Take 81 mg by mouth daily  Patient not taking: Reported on 3/23/2022    Historical Provider, MD   ammonium lactate (LAC-HYDRIN) 12 % lotion Apply twice daily or with dressing changes  Patient not taking: Reported on 4/17/2023 9/9/21 4/18/23  Milagro Kennedy MD   betamethasone dipropionate (DIPROSONE) 0 05 % cream Apply to reddened areas with each dressing change  Patient not taking: Reported on 4/17/2023 11/5/21 4/18/23  Milagro Kennedy MD   furosemide (LASIX) 40 mg tablet Take 1 tablet (40 mg total) by mouth 2 (two) times a day  Patient not taking: Reported on 3/23/2022 11/26/21 4/18/23  Jovani Galvan DO   triamcinolone (KENALOG) 0 1 % cream Apply topically 2 (two) times a day  Patient not taking: Reported on 4/17/2023 2/2/23 4/18/23  Jovani Galvan DO     I have reviewed home medications with patient personally  Allergies:    Allergies   Allergen Reactions    Metformin      Allergy listed on nursing home paperwork       Social History:  Marital Status: /Civil Union   Occupation:   Patient Pre-hospital Living Situation: Home  Patient Pre-hospital Level of Mobility: walks with walker  Patient Pre-hospital Diet Restrictions: carb "contr  Substance Use History:   Social History     Substance and Sexual Activity   Alcohol Use No    Comment: rarely     Social History     Tobacco Use   Smoking Status Former    Packs/day: 1 00    Years: 12 00    Pack years: 12 00    Types: Cigarettes    Quit date: 3/23/1994    Years since quittin 0   Smokeless Tobacco Never     Social History     Substance and Sexual Activity   Drug Use No       Family History:  Family History   Problem Relation Age of Onset    Atrial fibrillation Mother     Stroke Mother     Hypertension Father     Heart attack Paternal Grandfather 61    Diabetes Maternal Grandmother     Diabetes Maternal Grandfather     Diabetes Maternal Aunt     Diabetes Maternal Uncle        Physical Exam:     Vitals:   Blood Pressure: 133/68 (23 0301)  Pulse: 83 (23 030)  Temperature: 98 8 °F (37 1 °C) (23)  Temp Source: Oral (23)  Respirations: 18 (23)  Height: 6' 1\" (185 4 cm) (23)  Weight - Scale: 125 kg (275 lb 9 2 oz) (23)  SpO2: 95 % (23 030)    Physical Exam  Vitals and nursing note reviewed  Constitutional:       General: He is not in acute distress  Appearance: He is well-developed  HENT:      Head: Normocephalic and atraumatic  Eyes:      Conjunctiva/sclera: Conjunctivae normal    Cardiovascular:      Rate and Rhythm: Normal rate and regular rhythm  Heart sounds: No murmur heard  Pulmonary:      Effort: Pulmonary effort is normal  No respiratory distress  Breath sounds: Normal breath sounds  Abdominal:      Palpations: Abdomen is soft  Tenderness: There is no abdominal tenderness  Musculoskeletal:         General: No swelling  Cervical back: Neck supple  Comments: RLE lymphedema, dry skin, cellulitic changes, tourniquet wound around ankle with exposed tendon, see clinical media for images   Skin:     General: Skin is warm and dry        Capillary Refill: Capillary " refill takes 2 to 3 seconds  Neurological:      Mental Status: He is alert and oriented to person, place, and time  Psychiatric:         Mood and Affect: Mood normal           Additional Data:     Lab Results:  Results from last 7 days   Lab Units 04/17/23  2138   WBC Thousand/uL 13 04*   HEMOGLOBIN g/dL 13 1   HEMATOCRIT % 41 7   PLATELETS Thousands/uL 277   NEUTROS PCT % 78*   LYMPHS PCT % 14   MONOS PCT % 6   EOS PCT % 1     Results from last 7 days   Lab Units 04/17/23  2138   SODIUM mmol/L 132*   POTASSIUM mmol/L 5 4*   CHLORIDE mmol/L 97   CO2 mmol/L 26   BUN mg/dL 18   CREATININE mg/dL 1 08   ANION GAP mmol/L 9   CALCIUM mg/dL 8 8   GLUCOSE RANDOM mg/dL 190*                       Lines/Drains:  Invasive Devices     Peripheral Intravenous Line  Duration           Peripheral IV 04/17/23 Left Antecubital <1 day                    Imaging: Reviewed radiology reports from this admission including: xray(s) and Personally reviewed the following imaging: xray(s)  XR ankle 3+ views RIGHT   ED Interpretation by Lea Dunn DO (04/17 2149)   No acute fractures      XR foot 3+ views RIGHT   ED Interpretation by Lea Dunn DO (04/17 2147)   Big toe fracture distal end      MRI inpatient order    (Results Pending)   VAS lower limb arterial duplex, limited, unilateral    (Results Pending)       EKG and Other Studies Reviewed on Admission:   · EKG: No EKG obtained  ** Please Note: This note has been constructed using a voice recognition system   **

## 2023-04-18 NOTE — ED NOTES
ER Tech transported patient to his room  I was informed room wasn't ready at this time  Transported patient back to ED 27       Clarisse Rayo  04/18/23 0619

## 2023-04-18 NOTE — PLAN OF CARE
Problem: MOBILITY - ADULT  Goal: Maintain or return to baseline ADL function  Description: INTERVENTIONS:  -  Assess patient's ability to carry out ADLs; assess patient's baseline for ADL function and identify physical deficits which impact ability to perform ADLs (bathing, care of mouth/teeth, toileting, grooming, dressing, etc )  - Assess/evaluate cause of self-care deficits   - Assess range of motion  - Assess patient's mobility; develop plan if impaired  - Assess patient's need for assistive devices and provide as appropriate  - Encourage maximum independence but intervene and supervise when necessary  - Involve family in performance of ADLs  - Assess for home care needs following discharge   - Consider OT consult to assist with ADL evaluation and planning for discharge  - Provide patient education as appropriate  Outcome: Progressing  Goal: Maintains/Returns to pre admission functional level  Description: INTERVENTIONS:  - Perform BMAT or MOVE assessment daily    - Set and communicate daily mobility goal to care team and patient/family/caregiver     - Collaborate with rehabilitation services on mobility goals if consulted        - Out of bed to chair 3 times a day   - Out of bed for meals 3  times a day  - Out of bed for toileting  - Record patient progress and toleration of activity level   Outcome: Progressing     Problem: PAIN - ADULT  Goal: Verbalizes/displays adequate comfort level or baseline comfort level  Description: Interventions:  - Encourage patient to monitor pain and request assistance  - Assess pain using appropriate pain scale  - Administer analgesics based on type and severity of pain and evaluate response  - Implement non-pharmacological measures as appropriate and evaluate response  - Consider cultural and social influences on pain and pain management  - Notify physician/advanced practitioner if interventions unsuccessful or patient reports new pain  Outcome: Progressing     Problem: INFECTION - ADULT  Goal: Absence or prevention of progression during hospitalization  Description: INTERVENTIONS:  - Assess and monitor for signs and symptoms of infection  - Monitor lab/diagnostic results  - Monitor all insertion sites, i e  indwelling lines, tubes, and drains  - Monitor endotracheal if appropriate and nasal secretions for changes in amount and color  - [place] appropriate cooling/warming therapies per order  - Administer medications as ordered  - Instruct and encourage patient and family to use good hand hygiene technique  - Identify and instruct in appropriate isolation precautions for identified infection/condition  Outcome: Progressing     Problem: DISCHARGE PLANNING  Goal: Discharge to home or other facility with appropriate resources  Description: INTERVENTIONS:  - Identify barriers to discharge w/patient and caregiver  - Arrange for needed discharge resources and transportation as appropriate  - Identify discharge learning needs (meds, wound care, etc )  - Arrange for interpretive services to assist at discharge as needed  - Refer to Case Management Department for coordinating discharge planning if the patient needs post-hospital services based on physician/advanced practitioner order or complex needs related to functional status, cognitive ability, or social support system  Outcome: Progressing

## 2023-04-18 NOTE — ASSESSMENT & PLAN NOTE
Wt Readings from Last 3 Encounters:   04/17/23 125 kg (275 lb 9 2 oz)   09/09/21 127 kg (280 lb)   06/09/21 127 kg (280 lb)     Appears euvolemic on admission, chronic lymphedema   TTE 97/1677: LV systolic function normal  Dry Weight: ~280lbs    Plan:   Beta-blocker: none   Diuretic: lasix 40mg twice daily    ACE/ARB: none   Nitrates/antianginal: none   Monitor daily weight, I/Os, cardiac diet (sodium restriction), monitor K>4, Mg>2

## 2023-04-19 LAB
ALBUMIN SERPL BCP-MCNC: 3.2 G/DL (ref 3.5–5)
ALP SERPL-CCNC: 35 U/L (ref 34–104)
ALT SERPL W P-5'-P-CCNC: 9 U/L (ref 7–52)
ANION GAP SERPL CALCULATED.3IONS-SCNC: 7 MMOL/L (ref 4–13)
AST SERPL W P-5'-P-CCNC: 12 U/L (ref 13–39)
BASOPHILS # BLD AUTO: 0.05 THOUSANDS/ΜL (ref 0–0.1)
BASOPHILS NFR BLD AUTO: 1 % (ref 0–1)
BILIRUB SERPL-MCNC: 0.44 MG/DL (ref 0.2–1)
BUN SERPL-MCNC: 15 MG/DL (ref 5–25)
CALCIUM ALBUM COR SERPL-MCNC: 9.4 MG/DL (ref 8.3–10.1)
CALCIUM SERPL-MCNC: 8.8 MG/DL (ref 8.4–10.2)
CHLORIDE SERPL-SCNC: 97 MMOL/L (ref 96–108)
CO2 SERPL-SCNC: 30 MMOL/L (ref 21–32)
CREAT SERPL-MCNC: 1 MG/DL (ref 0.6–1.3)
EOSINOPHIL # BLD AUTO: 0.24 THOUSAND/ΜL (ref 0–0.61)
EOSINOPHIL NFR BLD AUTO: 3 % (ref 0–6)
ERYTHROCYTE [DISTWIDTH] IN BLOOD BY AUTOMATED COUNT: 13.7 % (ref 11.6–15.1)
GFR SERPL CREATININE-BSD FRML MDRD: 79 ML/MIN/1.73SQ M
GLUCOSE SERPL-MCNC: 131 MG/DL (ref 65–140)
GLUCOSE SERPL-MCNC: 136 MG/DL (ref 65–140)
GLUCOSE SERPL-MCNC: 141 MG/DL (ref 65–140)
GLUCOSE SERPL-MCNC: 167 MG/DL (ref 65–140)
GLUCOSE SERPL-MCNC: 202 MG/DL (ref 65–140)
HCT VFR BLD AUTO: 38.5 % (ref 36.5–49.3)
HGB BLD-MCNC: 12.2 G/DL (ref 12–17)
IMM GRANULOCYTES # BLD AUTO: 0.02 THOUSAND/UL (ref 0–0.2)
IMM GRANULOCYTES NFR BLD AUTO: 0 % (ref 0–2)
LYMPHOCYTES # BLD AUTO: 1.87 THOUSANDS/ΜL (ref 0.6–4.47)
LYMPHOCYTES NFR BLD AUTO: 21 % (ref 14–44)
MAGNESIUM SERPL-MCNC: 1.8 MG/DL (ref 1.9–2.7)
MCH RBC QN AUTO: 27.5 PG (ref 26.8–34.3)
MCHC RBC AUTO-ENTMCNC: 31.7 G/DL (ref 31.4–37.4)
MCV RBC AUTO: 87 FL (ref 82–98)
MONOCYTES # BLD AUTO: 0.57 THOUSAND/ΜL (ref 0.17–1.22)
MONOCYTES NFR BLD AUTO: 6 % (ref 4–12)
NEUTROPHILS # BLD AUTO: 6.14 THOUSANDS/ΜL (ref 1.85–7.62)
NEUTS SEG NFR BLD AUTO: 69 % (ref 43–75)
NRBC BLD AUTO-RTO: 0 /100 WBCS
PLATELET # BLD AUTO: 265 THOUSANDS/UL (ref 149–390)
PMV BLD AUTO: 9.5 FL (ref 8.9–12.7)
POTASSIUM SERPL-SCNC: 3.9 MMOL/L (ref 3.5–5.3)
PROT SERPL-MCNC: 7 G/DL (ref 6.4–8.4)
RBC # BLD AUTO: 4.44 MILLION/UL (ref 3.88–5.62)
SODIUM SERPL-SCNC: 134 MMOL/L (ref 135–147)
WBC # BLD AUTO: 8.89 THOUSAND/UL (ref 4.31–10.16)

## 2023-04-19 RX ORDER — FAMOTIDINE 20 MG/1
20 TABLET, FILM COATED ORAL 2 TIMES DAILY
Status: DISCONTINUED | OUTPATIENT
Start: 2023-04-19 | End: 2023-04-19

## 2023-04-19 RX ADMIN — CEFAZOLIN SODIUM 2000 MG: 2 SOLUTION INTRAVENOUS at 05:37

## 2023-04-19 RX ADMIN — INSULIN GLARGINE 60 UNITS: 100 INJECTION, SOLUTION SUBCUTANEOUS at 22:09

## 2023-04-19 RX ADMIN — TAMSULOSIN HYDROCHLORIDE 0.4 MG: 0.4 CAPSULE ORAL at 17:10

## 2023-04-19 RX ADMIN — INSULIN GLARGINE 60 UNITS: 100 INJECTION, SOLUTION SUBCUTANEOUS at 11:08

## 2023-04-19 RX ADMIN — CEFAZOLIN SODIUM 2000 MG: 2 SOLUTION INTRAVENOUS at 22:09

## 2023-04-19 RX ADMIN — FUROSEMIDE 40 MG: 40 TABLET ORAL at 17:10

## 2023-04-19 RX ADMIN — FINASTERIDE 5 MG: 5 TABLET, FILM COATED ORAL at 10:12

## 2023-04-19 RX ADMIN — INSULIN LISPRO 2 UNITS: 100 INJECTION, SOLUTION INTRAVENOUS; SUBCUTANEOUS at 16:36

## 2023-04-19 RX ADMIN — CEFAZOLIN SODIUM 2000 MG: 2 SOLUTION INTRAVENOUS at 14:49

## 2023-04-19 RX ADMIN — INSULIN LISPRO 4 UNITS: 100 INJECTION, SOLUTION INTRAVENOUS; SUBCUTANEOUS at 11:41

## 2023-04-19 RX ADMIN — ENOXAPARIN SODIUM 40 MG: 100 INJECTION SUBCUTANEOUS at 10:12

## 2023-04-19 RX ADMIN — ASPIRIN 81 MG: 81 TABLET, COATED ORAL at 10:12

## 2023-04-19 RX ADMIN — FUROSEMIDE 40 MG: 40 TABLET ORAL at 10:12

## 2023-04-19 RX ADMIN — CLOPIDOGREL BISULFATE 75 MG: 75 TABLET ORAL at 10:11

## 2023-04-19 RX ADMIN — ONDANSETRON HYDROCHLORIDE 4 MG: 2 SOLUTION INTRAMUSCULAR; INTRAVENOUS at 22:13

## 2023-04-19 NOTE — PLAN OF CARE
Problem: OCCUPATIONAL THERAPY ADULT  Goal: Performs self-care activities at highest level of function for planned discharge setting  See evaluation for individualized goals  Description: Treatment Interventions: ADL retraining, Functional transfer training, UE strengthening/ROM, Endurance training, Patient/family training, Equipment evaluation/education, Compensatory technique education, Continued evaluation, Energy conservation, Activityengagement          See flowsheet documentation for full assessment, interventions and recommendations  Note: Limitation: Decreased ADL status, Decreased UE strength, Decreased UE ROM, Decreased Safe judgement during ADL, Decreased sensation, Decreased endurance, Decreased self-care trans, Decreased high-level ADLs  Prognosis: Fair  Assessment: Patient is a 60y o  year old male seen for OT eval s/p admit to [facility] on 4/17/2023 with toe pain, R leg wound, R LE swelling; WBAT in surgical shoe to RLE  Comorbidities affecting pts functional performance include a significant PMH of: hx of L BKA with prosthetic  Patient with active OT orders and activity orders for Up and OOB as tolerated  Personal factors affecting pt at time of IE include: difficulty performing ADLs and IADLs, difficulty with functional mobility/transfers  Prior to admission, pt requires (A) for LB bathing/dressing, (I) with UB bathing/dressing, (A) for toileting (decreased shoulder extension of b/l UEs)  W/c level or ambulating short distances in the home with RW   Upon evaluation, patients functional status as follows: eating: MOD I, grooming: SBA, UB bathing: SBA, LB bathing: MAX A, UB dressing: SBA, LB dressing: MAX A, toileting: dependent; functional transfers: MOD Ax2, bed mobility: DNT, functional mobility: MIN A x2 with RW, sitting/standing tolerance: ~30 seconds with b/l UE support- due to the following deficits impacting occupational performance: decreased B UE strength, decreased static/dynamic sitting/standing balance, decreased activity tolerance, decreased safety awareness, and increased pain  Patient would benefit from continued skilled OT therapy while in acute setting to address deficits as defined above and maximize (I) with ADLs and functional mobility  Occupational performance areas to address include: grooming, bathing, toileting, UB/LB dressing, functional mobility, household maintenance, and medication management  Based on the aforementioned OT evaluation, functional performance deficits, and assessments, pt has been identified as a high complexity evaluation    Co treatment with PT secondary to complex medical condition of pt, possible A of 2 required to achieve and maintain transitional movements, requiring the need of skilled therapeutic intervention of 2 therapists to achieve delivery of services       OT Discharge Recommendation: Post acute rehabilitation services

## 2023-04-19 NOTE — PROGRESS NOTES
[address] [place] [address]  Progress Note  Name: Ruthie Flores I  MRN: [id number]  Unit/Bed#: E5 -01 I Date of Admission: 4/17/2023   Date of Service: 4/19/2023 I Hospital Day: 2    Assessment/Plan   CAD (coronary artery disease)  Assessment & Plan  Continue statin, plavix    Diabetic autonomic neuropathy associated with type 2 diabetes mellitus [facility])  Assessment & Plan  Lab Results   Component Value Date    HGBA1C 6 7 (A) 02/02/2023     Insulin regimen 80 units BID  Add sliding scale, algorithm 4  Continue lantus 60 units BID    Chronic diastolic heart failure (HCC)  Assessment & Plan  Wt Readings from Last 3 Encounters:   04/19/23 126 kg (277 lb 5 4 oz)   09/09/21 127 kg (280 lb)   06/09/21 127 kg (280 lb)     Appears euvolemic on admission, chronic lymphedema    Prior 2D echo reviewed, normal EF   Continue lasix 40mg daily    * Cellulitis of right ankle  Assessment & Plan  Presents with RLE foot wound, compression stocking tourniquet around R ankle with exposed tendon, cellulitic changes   Concern for osteomyelitis versus cellulitis   Continue IV Ancef, blood and wound cultures pending   MRI pending her right lower extremity   Lead study shows diffuse disease of right lower extremity   Podiatry and vascular consult           VTE Pharmacologic Prophylaxis:   Pharmacologic: Enoxaparin (Lovenox)  Mechanical VTE Prophylaxis in Place: Yes    Patient Centered Rounds: I have performed bedside rounds with nursing staff today  Discussions with Specialists or Other Care Team Provider: podiatry, vascular    Current Length of Stay: 2 day(s)    Current Patient Status: Inpatient   Certification Statement: The patient will continue to require additional inpatient hospital stay due to IV abx, await MRI and vascular evaluation    Discharge Plan: pending    Code Status: Level 1 - Full Code      Subjective:   No events overnight  Denies fevers or chills  Reports some nausea after abx   Tolerating diet, no emesis with normal BMs  Objective:     Vitals:   Temp (24hrs), Av 6 °F (36 4 °C), Min:97 4 °F (36 3 °C), Max:98 °F (36 7 °C)    Temp:  [97 4 °F (36 3 °C)-98 °F (36 7 °C)] 98 °F (36 7 °C)  HR:  [69-88] 69  Resp:  [16-18] 16  BP: (113-151)/(54-84) 113/54  SpO2:  [90 %-96 %] 96 %  Body mass index is 36 59 kg/m²  Input and Output Summary (last 24 hours): Intake/Output Summary (Last 24 hours) at 2023 0942  Last data filed at 2023  Gross per 24 hour   Intake 890 ml   Output 875 ml   Net 15 ml       Physical Exam:     Physical Exam  Vitals and nursing note reviewed  Constitutional:       General: He is not in acute distress  Appearance: He is well-developed  He is obese  HENT:      Head: Normocephalic and atraumatic  Eyes:      General: No scleral icterus  Conjunctiva/sclera: Conjunctivae normal    Cardiovascular:      Rate and Rhythm: Normal rate and regular rhythm  Pulmonary:      Effort: Pulmonary effort is normal  No respiratory distress  Abdominal:      Palpations: Abdomen is soft  Tenderness: There is no abdominal tenderness  Musculoskeletal:         General: No swelling  Comments: RLE lymphedema, dry skin, exposed tendon   Skin:     General: Skin is warm and dry  Neurological:      Mental Status: He is alert  Mental status is at baseline     Psychiatric:         Mood and Affect: Mood normal            Additional Data:     Labs:    Results from last 7 days   Lab Units 23  0450   WBC Thousand/uL 8 89   HEMOGLOBIN g/dL 12 2   HEMATOCRIT % 38 5   PLATELETS Thousands/uL 265   NEUTROS PCT % 69   LYMPHS PCT % 21   MONOS PCT % 6   EOS PCT % 3     Results from last 7 days   Lab Units 23  0450   SODIUM mmol/L 134*   POTASSIUM mmol/L 3 9   CHLORIDE mmol/L 97   CO2 mmol/L 30   BUN mg/dL 15   CREATININE mg/dL 1 00   ANION GAP mmol/L 7   CALCIUM mg/dL 8 8   ALBUMIN g/dL 3 2*   TOTAL BILIRUBIN mg/dL 0 44   ALK PHOS U/L 35   ALT U/L 9   AST U/L 12*   GLUCOSE RANDOM mg/dL 131         Results from last 7 days   Lab Units 04/19/23  0657 04/18/23  2203 04/18/23  1606 04/18/23  1110 04/18/23  0725   POC GLUCOSE mg/dl 136 158* 224* 184* 175*                   * I Have Reviewed All Lab Data Listed Above  * Additional Pertinent Lab Tests Reviewed: All Labs For Current Hospital Admission Reviewed    Imaging:    XR ankle 3+ views RIGHT    Result Date: 4/18/2023  Impression: No acute osseous abnormality  Workstation performed: [id number]     XR foot 3+ views RIGHT    Result Date: 4/18/2023  Impression: Nondisplaced fracture, terminal tuft of the right 1st toe  Findings concur with the referring clinician's preliminary interpretation already in the patient's electronic health record    Workstation performed: [id number]       Recent Cultures (last 7 days):     Results from last 7 days   Lab Units 04/17/23 2139   GRAM STAIN RESULT  2+ Polys*  2+ Gram negative rods*       Last 24 Hours Medication List:   Current Facility-Administered Medications   Medication Dose Route Frequency Provider Last Rate    acetaminophen  650 mg Oral Q6H PRN Ish Addison, PA-JESSICA      aluminum-magnesium hydroxide-simethicone  30 mL Oral Q6H PRN Ish Addison PA-C      aspirin  81 mg Oral Daily Ish Addison PA-C      [facility]  [facility]      cefazolin  2,000 mg Intravenous Q8H ANNY Krueger-C 2,000 mg (04/19/23 0537)    clopidogrel  75 mg Oral Daily Ish Addison PA-C      enoxaparin  40 mg Subcutaneous Daily Ish Addison PA-C      finasteride  5 mg Oral Daily Ish Addison PA-C      furosemide  40 mg Oral BID ANNY Krueger-JESSICA      insulin glargine  60 Units Subcutaneous Q12H [address] Amrik Renee MD      insulin lispro  2-12 Units Subcutaneous 4x Daily (AC & HS) Ish Addison PA-C      ondansetron  4 mg Intravenous Q6H PRN Ish Addison, PA-JESSICA      pantoprazole  40 mg Oral Early Morning Ish Addison, PA-JESSICA      polyethylene glycol  17 g Oral Daily Ish Addison PA-C  tamsulosin  0 4 mg Oral Daily With Dinner Jina Lozano PA-C          Today, Patient Was Seen By: Glo Palomino MD    ** Please Note: Dictation voice to text software may have been used in the creation of this document   **

## 2023-04-19 NOTE — CONSULTS
Consultation - Vascular Surgery   Jesús Wells 60 y o  male MRN: [id number]  Unit/Bed#: E5 -01 Encounter: [id number]      Assessment/Plan      Assessment:  60yo M former smoker w/HLD, dCHF, type II DM w/neuropathy, CAD s/p CABG x3 '18, severe RLE lymphedema w/long standing foot drop and neuropathy, and PAD s/p L pop recanalization with subsequent L BKA '18 for tissue loss, presents with RLE ankle and hallux wound  Hgb 12 2  WBC 8 89  SCr/eGFR 1 00/79    Plan:  - Long standing lymphedema of the RLE with chronic neuropathy and drop foot  Patient had compression stocking rolled down on ankle x2 weeks resulting in tissue loss around ankle  - Reviewed arterial duplex from 4/18/23, RLE w/50-75% stenosis of the proximal pop artery, YUDITH not obtained, MTP//73  - Imaging suggests adequate perfusion for healing  Patient does remain at significant risk of limb loss given extent of lymphedema and wound w/exposed tendon  - No indication for vascular intervention   - Recommend RLE elevation and compression   - Local wound care per podiatry  - Follow up formal MRI read   - Please reconsult if limb is felt to be nonsalvageable  Patient would require AKA in this scenario   - D/w Dr Kasie Bassett and podiatry     History of Present Illness   Physician Requesting Consult: Karlee Noel MD  Reason for Consult / Principal Problem: Tissue loss RLE     HPI: Jesús Wells is a 60y o  year old male  former smoker w/HLD, dCHF, type II DM w/neuropathy, CAD s/p CABG x3 '18, severe RLE lymphedema w/long standing foot drop and neuropathy, and PAD s/p L pop recanalization with subsequent L BKA '18 for tissue loss, presents with RLE ankle and hallux wound  Patient presented to the ED on 4/17/23 for a bleeding hallux wound  He states that he looked down and noticed there was blood on his toe, prompting a visit to the ED  He was unaware of any prior wounds or lesions as he has severe neuropathy   Patient walks minimally as he has a prior L BKA  Patient states that he uses the RLE for pivoting to use the restroom and wishes to avoid amputation  Patient had been wearing compression stockings for approximately 2 weeks, but had not noticed that the stocking had rolled down around his ankle  This resulted in a circumferential wound with exposed tendon  He follows with Dr Ashley Ennis (podiatry)    At baseline, patient is unable to move his toes of the RLE  This has been longstanding  He is able to flex the ankle however minimally  Again this is longstanding with no acute changes  Inpatient consult to Vascular Surgery  Consult performed by: Antony Smith PA-C  Consult ordered by: Eileen Mohan MD          Review of Systems   Constitutional: Negative  HENT: Negative  Eyes: Negative  Respiratory: Negative  Cardiovascular: Negative  Gastrointestinal: Negative  Endocrine: Negative  Genitourinary: Negative  Musculoskeletal: Negative  Skin: Positive for wound  Allergic/Immunologic: Negative  Neurological: Positive for weakness and numbness  Hematological: Negative  Psychiatric/Behavioral: Negative          Historical Information   Past Medical History:   Diagnosis Date    Anxiety     Anxiety and depression     Cataract     [facility] failure [facility] [address] [facility]     Coronary artery disease     Depression     Diabetes mellitus ([facility] [address] [facility]     Diabetic autonomic neuropathy associated with type 2 diabetes mellitus ([facility] [address] )     Last Assessed: 12/28/2017    History of DVT (deep vein thrombosis)     left LE    Hyperlipidemia     Lymphedema of right lower extremity     Obesity     Orthostatic hypotension      Past Surgical History:   Procedure Laterality Date    CORONARY ARTERY BYPASS GRAFT      EYE SURGERY      cataracts bilateral    LEG AMPUTATION THROUGH LOWER TIBIA AND FIBULA Left 8/3/2018    Procedure: AMPUTATION BELOW KNEE (BKA) L BKA;  Surgeon: Ron Ro MD;  Location: [facility];  Service: Vascular  OR CORONARY ARTERY BYP W/VEIN & ARTERY GRAFT 4 VEIN N/A 3/28/2018    Procedure: CORONARY ARTERY BYPASS GRAFT (CABG) x3 VESSELS, LIMA TO LAD, SVG--> PDA, SVG--> OM2,  RIGHT LEG EVH/SVH TO , INTRA-OP AMANDEEP;  Surgeon: Jose M Leon MD;  Location: [facility];  Service: Cardiac Surgery    ROTATOR CUFF REPAIR Bilateral      Social History   Social History     Substance and Sexual Activity   Alcohol Use No    Comment: rarely     Social History     Substance and Sexual Activity   Drug Use No     E-Cigarette/Vaping    E-Cigarette Use Never User      E-Cigarette/Vaping Substances    Nicotine No     THC No     CBD No     Flavoring No     Other No     Unknown No      Social History     Tobacco Use   Smoking Status Former    Packs/day: 1 00    Years: 12 00    Pack years: 12 00    Types: Cigarettes    Quit date: 3/23/1994    Years since quittin 0   Smokeless Tobacco Never     Family History:   Family History   Problem Relation Age of Onset    Atrial fibrillation Mother     Stroke Mother     Hypertension Father     Heart attack Paternal Grandfather 61    Diabetes Maternal Grandmother     Diabetes Maternal Grandfather     Diabetes Maternal Aunt     Diabetes Maternal Uncle    }    Meds/Allergies   current meds:   Current Facility-Administered Medications   Medication Dose Route Frequency    acetaminophen (TYLENOL) tablet 650 mg  650 mg Oral Q6H PRN    aluminum-magnesium hydroxide-simethicone (MYLANTA) oral suspension 30 mL  30 mL Oral Q6H PRN    aspirin (ECOTRIN LOW STRENGTH) EC tablet 81 mg  81 mg Oral Daily    atorvastatin (LIPITOR) tablet 80 mg  80 mg Oral After Dinner    ceFAZolin (ANCEF) IVPB (premix in dextrose) 2,000 mg 50 mL  2,000 mg Intravenous Q8H    clopidogrel (PLAVIX) tablet 75 mg  75 mg Oral Daily    enoxaparin (LOVENOX) subcutaneous injection 40 mg  40 mg Subcutaneous Daily    finasteride (PROSCAR) tablet 5 mg  5 mg Oral Daily    furosemide (LASIX) tablet 40 mg  40 mg Oral BID  insulin glargine (LANTUS) subcutaneous injection 60 Units 0 6 mL  60 Units Subcutaneous Q12H [facility]    insulin lispro (HumaLOG) 100 units/mL subcutaneous injection 2-12 Units  2-12 Units Subcutaneous 4x Daily (AC & HS)    ondansetron (ZOFRAN) injection 4 mg  4 mg Intravenous Q6H PRN    pantoprazole (PROTONIX) EC tablet 40 mg  40 mg Oral Early Morning    polyethylene glycol (MIRALAX) packet 17 g  17 g Oral Daily    tamsulosin (FLOMAX) capsule 0 4 mg  0 4 mg Oral Daily With Dinner    and PTA meds:   Prior to Admission Medications   Prescriptions Last Dose Informant Patient Reported? Taking?    HumaLOG KwikPen 100 units/mL injection pen 4/17/2023  No Yes   Sig: INJECT 15 UNITS  SUBCUTANEOUSLY 3 TIMES  DAILY WITH MEALS   Insulin Glargine Solostar (Lantus SoloStar) 100 UNIT/ML SOPN 4/17/2023  No Yes   Sig: Inject 0 9 mL (90 Units total) under the skin 2 (two) times a day   Lantus SoloStar 100 units/mL injection pen 4/17/2023  No Yes   Sig: Inject 80 Units under the skin every 12 (twelve) hours   aspirin (ECOTRIN LOW STRENGTH) 81 mg EC tablet Not Taking  Yes No   Sig: Take 81 mg by mouth daily   Patient not taking: Reported on 3/23/2022   atorvastatin (LIPITOR) 80 mg tablet 4/17/2023  No Yes   Sig: Take 1 tablet (80 mg total) by mouth daily   cholecalciferol (VITAMIN D3) 1,000 units tablet 4/17/2023  Yes Yes   Sig: Take 1,000 Units by mouth daily   clopidogrel (PLAVIX) 75 mg tablet 4/17/2023  No Yes   Sig: Take 1 tablet (75 mg total) by mouth daily   finasteride (PROSCAR) 5 mg tablet 4/17/2023  No Yes   Sig: Take 1 tablet (5 mg total) by mouth daily   furosemide (LASIX) 40 mg tablet 4/17/2023  No Yes   Sig: Take 1 tablet (40 mg total) by mouth 2 (two) times a day   glucose blood test strip 4/17/2023  Yes Yes   Sig: ReliOn Prime Meter   metoclopramide (REGLAN) 5 mg tablet 4/17/2023  No Yes   Sig: Take 1 tablet (5 mg total) by mouth daily   pantoprazole (PROTONIX) 40 mg tablet 4/17/2023  No Yes   Sig: Take 1 tablet (40 "mg total) by mouth daily in the early morning   tamsulosin (FLOMAX) 0 4 mg 4/16/2023  No Yes   Sig: Take 1 capsule (0 4 mg total) by mouth daily with dinner      Facility-Administered Medications: None     Allergies   Allergen Reactions    Metformin      Allergy listed on nursing home paperwork       Objective   Vitals: Blood pressure 146/69, pulse 75, temperature 98 °F (36 7 °C), temperature source Oral, resp  rate 16, height 6' 1\" (1 854 m), weight 126 kg (277 lb 5 4 oz), SpO2 96 %  ,Body mass index is 36 59 kg/m²  Intake/Output Summary (Last 24 hours) at 4/19/2023 1109  Last data filed at 4/18/2023 2020  Gross per 24 hour   Intake 890 ml   Output 875 ml   Net 15 ml     Invasive Devices     Peripheral Intravenous Line  Duration           Peripheral IV 04/17/23 Left Antecubital 1 day                Physical Exam  Constitutional:       General: He is not in acute distress  Appearance: He is obese  He is not ill-appearing, toxic-appearing or diaphoretic  HENT:      Head: Normocephalic and atraumatic  Right Ear: External ear normal       Left Ear: External ear normal       Nose: Nose normal       Mouth/Throat:      Mouth: Mucous membranes are moist       Pharynx: Oropharynx is clear  Eyes:      General: No scleral icterus  Extraocular Movements: Extraocular movements intact  Conjunctiva/sclera: Conjunctivae normal    Cardiovascular:      Rate and Rhythm: Normal rate and regular rhythm  Pulmonary:      Effort: Pulmonary effort is normal       Breath sounds: Normal breath sounds  Abdominal:      General: Abdomen is flat  Palpations: Abdomen is soft  Musculoskeletal:         General: Deformity present  Cervical back: Normal range of motion and neck supple  Right lower leg: Edema present  Comments: S/p L BKA   Skin:     General: Skin is warm  Findings: Lesion and rash present        Comments: Severe lymphedema of the RLE with elephantiasis and thickened skin from the " knee down  Circumferential wound at the level of the ankle with exposed tendon    Neurological:      Mental Status: He is alert and oriented to person, place, and time  Motor: Weakness present  Comments: Unable to move toes of the RLE and weakness noted at the level of the ankle  This is chronic with no acute changes  Psychiatric:         Mood and Affect: Mood normal          Behavior: Behavior normal          Lab Results:   Coags: No results found for: PT, PTT, INR, Creatinine:   Lab Results   Component Value Date    CREATININE 1 00 04/19/2023   , Lipid Panel: No results found for: CHOL, CBC with diff:   Lab Results   Component Value Date    WBC 8 89 04/19/2023    HGB 12 2 04/19/2023    HCT 38 5 04/19/2023    MCV 87 04/19/2023     04/19/2023    MCH 27 5 04/19/2023    MCHC 31 7 04/19/2023    RDW 13 7 04/19/2023    MPV 9 5 04/19/2023    NRBC 0 04/19/2023   , BMP/CMP:   Lab Results   Component Value Date    SODIUM 134 (L) 04/19/2023    K 3 9 04/19/2023    CL 97 04/19/2023    CO2 30 04/19/2023    BUN 15 04/19/2023    CREATININE 1 00 04/19/2023    CALCIUM 8 8 04/19/2023    AST 12 (L) 04/19/2023    ALT 9 04/19/2023    ALKPHOS 35 04/19/2023    EGFR 79 04/19/2023     Imaging Studies: I have personally reviewed pertinent reports  EKG, Pathology, and Other Studies: I have personally reviewed pertinent reports          Code Status: Level 1 - Full Code  Advance Directive and Living Will:      Power of :    POLST:

## 2023-04-19 NOTE — CASE MANAGEMENT
Case Management Assessment & Discharge Planning Note    Patient name Aguila Gutierrez  Location [address] [place] [address] 552/E5 [place] [phone] [id number]-* MRN [id number]  : 1960 Date 2023       Current Admission Date: 2023  Current Admission Diagnosis:Cellulitis of right ankle   Patient Active Problem List    Diagnosis Date Noted    Cellulitis of right ankle 2023    Non-pressure chronic ulcer of right calf with fat layer exposed ([place] [address] ) 2023    Other acute osteomyelitis, left ankle and foot ([place] [address] ) 10/29/2020    Embolism and thrombosis of arteries of the lower extremities ([place] [address] ) 10/29/2020    Lymphedema of right lower extremity 10/29/2020    Venous stasis dermatitis of right lower extremity 10/29/2020    Abdominal distension 10/29/2020    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    Nodular rash 2020    Chronic venous insufficiency 2019    CAD (coronary artery disease) 2019    Amputated below knee, left ([place] [address] ) 10/04/2018    Phantom limb syndrome without pain ([place] [address] ) 10/04/2018    Obstructive sleep apnea 2018    Hyponatremia 2018    Diabetic autonomic neuropathy associated with type 2 diabetes mellitus ([place] [address] ) 2018    S/P CABG x 3 2018    Other constipation 2018    Chronic diastolic heart failure ([place] [address] ) 2018    Hypertensive heart disease with congestive heart failure (HCC)     Triple vessel coronary artery disease 2018    Diabetic gastroparesis ([place] [address] ) 2018    Class 2 severe obesity due to excess calories with serious comorbidity and body mass index (BMI) of 35 0 to 35 9 in adult ([place] [address] ) 2018    Orthostatic hypotension 2018    PAD (peripheral artery disease) ([place] [address] ) 2018    S/P BKA (below knee amputation), left ([place] [address] ) 2018    Anxiety and depression 2017    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 2017    Vitamin D deficiency 2016    Hyperlipidemia 02/11/2015    Diabetic neuropathy, type II diabetes mellitus ([facility] [address] ) 11/06/2014      LOS (days): 2  Geometric Mean LOS (GMLOS) (days): 3 00  Days to GMLOS:1 3     OBJECTIVE:    Risk of Unplanned Readmission Score: 9 45         Current admission status: Inpatient       Preferred Pharmacy:   [address] [place] [address] 32  [place] [address]  Phone: [phone] Fax: [phone]    [facility] ([address],   [address] [facility]  [facility]  [address] [facility] [address]  Phone: [phone] Fax: [phone]    [facility] ([facility] ) - Anaid Smith [address],[facility]  [address]  Phone: [phone] Fax: [phone]    Primary Care Provider: Anoop Barajas DO    Primary Insurance: [facility] REP  Secondary Insurance:     ASSESSMENT:  [address] [facility], [address] Representative - Spouse   Primary Phone: [phone] (Mobile)                 Readmission Root Cause  30 Day Readmission: No    Patient Information  Admitted from[de-identified] Home  Mental Status: Alert  During Assessment patient was accompanied by: Not accompanied during assessment  Assessment information provided by[de-identified] Patient  Primary Caregiver: Self  Support Systems: Spouse/significant other  [place] of Residence: [address] [place] [address] do you live in?: [address] entry access options   Select all that apply : Stairs  Number of steps to enter home : 3 (plus curb)  Do the steps have railings?: Yes (1)  Type of Current Residence: 2 story home  Upon entering residence, is there a bedroom on the main floor (no further steps)?: No (Patient sleeps in [facility])  A bedroom is located on the following floor levels of residence (select all that apply):: 2nd Floor  Upon entering residence, is there a bathroom on the main floor (no further steps)?: Yes (1/2 bath)  Number of steps to 2nd floor "from main floor: One Flight  In the last 12 months, was there a time when you were not able to pay the mortgage or rent on time?: No  In the last 12 months, how many places have you lived?: 1  In the last 12 months, was there a time when you did not have a steady place to sleep or slept in a shelter (including now)?: No  Homeless/housing insecurity resource given?: N/A  Living Arrangements: Lives w/ Spouse/significant other    Activities of Daily Living Prior to Admission  Functional Status: Independent  Completes ADLs independently?: Yes  Ambulates independently?: Yes  Does patient use assisted devices?: Yes  Assisted Devices (DME) used: Sandra Math, Wheelchair  Does patient currently own DME?: Yes  What DME does the patient currently own?: Wheelchair, Sandra Math  Does the patient have a history of Short-Term Rehab?: Yes (\"[facility])  Does patient have a history of HHC?: No  Does patient currently have [facility]?: No    Patient Information Continued  Income Source: SSI/SSD  Does patient have prescription coverage?: Yes  Within the past 12 months, you worried that your food would run out before you got the money to buy more : Never true  Within the past 12 months, the food you bought just didn't last and you didn't have money to get more : Never true  Food insecurity resource given?: N/A    Means of Transportation  Means of Transport to Appts[de-identified] Family transport  In the past 12 months, has lack of transportation kept you from medical appointments or from getting medications?: No  In the past 12 months, has lack of transportation kept you from meetings, work, or from getting things needed for daily living?: No  Was application for public transport provided?: N/A        DISCHARGE DETAILS:    Discharge planning discussed with[de-identified] Patient  Freedom of Choice: Yes  Comments - Freedom of Choice: Pt agreeable to in-patient rehabilitation  Were Treatment Team discharge recommendations reviewed with patient/caregiver?: Yes  Did " patient/caregiver verbalize understanding of patient care needs?: Yes  Were patient/caregiver advised of the risks associated with not following Treatment Team discharge recommendations?: Yes  Contacts  Patient Contacts: Wagner Kendall (Spouse)  [phone] Eben[place]bert [place])  Relationship to Patient[de-identified] Family  Contact Method: Phone  Phone Number: Wagner Kendall (Spouse)  [phone] Eben Gilbert)  Reason/Outcome: Continuity of Care, Emergency Contact    Treatment Team Recommendation: Acute Rehab, Short Term Rehab    Additional Comments: CM met with Pt to discuss dcp  Pt is agreeable to inpatient rehabilitation  Discussed acute v Sub acute- Pt is requesting acute first, but agreeable to sub acute as second choice  Pt identified preferred facilities  CM sent referrals  CM continues to follow

## 2023-04-19 NOTE — PLAN OF CARE
Problem: MOBILITY - ADULT  Goal: Maintain or return to baseline ADL function  Description: INTERVENTIONS:  -  Assess patient's ability to carry out ADLs; assess patient's baseline for ADL function and identify physical deficits which impact ability to perform ADLs (bathing, care of mouth/teeth, toileting, grooming, dressing, etc )  - Assess/evaluate cause of self-care deficits   - Assess range of motion  - Assess patient's mobility; develop plan if impaired  - Assess patient's need for assistive devices and provide as appropriate  - Encourage maximum independence but intervene and supervise when necessary  - Involve family in performance of ADLs  - Assess for home care needs following discharge   - Consider OT consult to assist with ADL evaluation and planning for discharge  - Provide patient education as appropriate  Outcome: Progressing  Goal: Maintains/Returns to pre admission functional level  Description: INTERVENTIONS:  - Perform BMAT or MOVE assessment daily    - Set and communicate daily mobility goal to care team and patient/family/caregiver  - Collaborate with rehabilitation services on mobility goals if consulted  - Perform Range of Motion 3 times a day  - Reposition patient every 2 hours    - Dangle patient 3 times a day  - Stand patient 3 times a day  - Ambulate patient 3 times a day  - Out of bed to chair 3 times a day   - Out of bed for meals 3 times a day  - Out of bed for toileting  - Record patient progress and toleration of activity level   Outcome: Progressing     Problem: PAIN - ADULT  Goal: Verbalizes/displays adequate comfort level or baseline comfort level  Description: Interventions:  - Encourage patient to monitor pain and request assistance  - Assess pain using appropriate pain scale  - Administer analgesics based on type and severity of pain and evaluate response  - Implement non-pharmacological measures as appropriate and evaluate response  - Consider cultural and social influences on pain and pain management  - Notify physician/advanced practitioner if interventions unsuccessful or patient reports new pain  Outcome: Progressing     Problem: INFECTION - ADULT  Goal: Absence or prevention of progression during hospitalization  Description: INTERVENTIONS:  - Assess and monitor for signs and symptoms of infection  - Monitor lab/diagnostic results  - Monitor all insertion sites, i e  indwelling lines, tubes, and drains  - Monitor endotracheal if appropriate and nasal secretions for changes in amount and color  - [place] appropriate cooling/warming therapies per order  - Administer medications as ordered  - Instruct and encourage patient and family to use good hand hygiene technique  - Identify and instruct in appropriate isolation precautions for identified infection/condition  Outcome: Progressing     Problem: DISCHARGE PLANNING  Goal: Discharge to home or other facility with appropriate resources  Description: INTERVENTIONS:  - Identify barriers to discharge w/patient and caregiver  - Arrange for needed discharge resources and transportation as appropriate  - Identify discharge learning needs (meds, wound care, etc )  - Arrange for interpretive services to assist at discharge as needed  - Refer to Case Management Department for coordinating discharge planning if the patient needs post-hospital services based on physician/advanced practitioner order or complex needs related to functional status, cognitive ability, or social support system  Outcome: Progressing     Problem: Prexisting or High Potential for Compromised Skin Integrity  Goal: Skin integrity is maintained or improved  Description: INTERVENTIONS:  - Identify patients at risk for skin breakdown  - Assess and monitor skin integrity  - Assess and monitor nutrition and hydration status  - Monitor labs   - Assess for incontinence   - Turn and reposition patient  - Assist with mobility/ambulation  - Relieve pressure over bony prominences  - Avoid friction and shearing  - Provide appropriate hygiene as needed including keeping skin clean and dry  - Evaluate need for skin moisturizer/barrier cream  - Collaborate with interdisciplinary team   - Patient/family teaching  - Consider wound care consult   Outcome: Progressing

## 2023-04-19 NOTE — PLAN OF CARE
Problem: PHYSICAL THERAPY ADULT  Goal: Performs mobility at highest level of function for planned discharge setting  See evaluation for individualized goals  Description: Treatment/Interventions: Functional transfer training, LE strengthening/ROM, Elevations, Therapeutic exercise, Endurance training, Patient/family training, Bed mobility, Gait training, Spoke to nursing, OT  Equipment Recommended: Gaviota Morgan, Wheelchair (pt has)       See flowsheet documentation for full assessment, interventions and recommendations  Note: Prognosis: Fair  Problem List: Decreased strength, Decreased endurance, Impaired balance, Decreased mobility, Impaired judgement, Impaired sensation, Obesity, Decreased skin integrity  Assessment: Pt  62 y o male w/ PMHx of L BKA, significant neuropathy, uncontrolled diabetes type 2, PAD, CAD, severe obesity, and lymphedema  Pt presented after hitting his R toe resulting in nail avulsion & bleeding  Pt reportedly had been wearing compression stocking to RLE for 3 weeks & got bunched up & tourniquet around his right ankle  R ankle revealed a severe ankle wound w/ exposed tendon & significant edema  Pt admitted for R ankle wound w/ cellulitis w/ R hallux wound 2* to injury & RLE lymphedema  MRI result pending read  Pt referred to PT for mobility assessment & D/C planning w/ orders of WBAT & to ambulate pt  Please see above for information re: home set-up & PLOF as well as objective findings during PT assessment  On eval, pt functioning below baseline hence will continue skilled PT to improve function & safety  Pt require modAx2 for transfers & minAx2 for amb w/ RW & LLE prosthesis + cues for techniques & safety  Gait deviations as above, slow w/ dec foot clearance & R foot drag 2* to chronic foot drop but no gross LOB noted  Limit amb 2* to condition of R leg wound & for safety  The patient's AM-PAC Basic Mobility Inpatient Short Form Raw Score is 12   A Raw score of less than or equal to 16 "suggests the patient may benefit from discharge to post-acute rehabilitation services  Please also refer to the recommendation of the Physical Therapist for safe discharge planning  From PT standpoint, due to above mentioned deficits & high risk for falls, pt will benefit from inpt rehab at D/C  No SOB & dizziness reported t/o session  Nsg staff most recent vital signs as follows: /69   Pulse 75   Temp 98 °F (36 7 °C) (Oral)   Resp 16   Ht 6' 1\" (1 854 m)   Wt 126 kg (277 lb 5 4 oz)   SpO2 96%   BMI 36 59 kg/m²   At end of session, pt remain OOB in chair in stable condition, call bell & phone in reach  Fall precautions reinforced w/ good understanding  CM to follow  Nsg staff to continue to mobilized pt (OOB in chair for all meals & ambulate in room/unit) as tolerated to prevent further decline in function  Nsg notified  Co-eval was necessary to complete this PT eval for the pt's best interest given pt's medical acuity & complexity  Barriers to Discharge: Inaccessible home environment, Decreased caregiver support  Barriers to Discharge Comments: VITOR; home alone during the day    PT Discharge Recommendation: Post acute rehabilitation services    See flowsheet documentation for full assessment          "

## 2023-04-19 NOTE — ASSESSMENT & PLAN NOTE
Wt Readings from Last 3 Encounters:   04/19/23 126 kg (277 lb 5 4 oz)   09/09/21 127 kg (280 lb)   06/09/21 127 kg (280 lb)     Appears euvolemic on admission, chronic lymphedema    Prior 2D echo reviewed, normal EF   Continue lasix 40mg daily

## 2023-04-19 NOTE — PLAN OF CARE
Problem: MOBILITY - ADULT  Goal: Maintain or return to baseline ADL function  Description: INTERVENTIONS:  -  Assess patient's ability to carry out ADLs; assess patient's baseline for ADL function and identify physical deficits which impact ability to perform ADLs (bathing, care of mouth/teeth, toileting, grooming, dressing, etc )  - Assess/evaluate cause of self-care deficits   - Assess range of motion  - Assess patient's mobility; develop plan if impaired  - Assess patient's need for assistive devices and provide as appropriate  - Encourage maximum independence but intervene and supervise when necessary  - Involve family in performance of ADLs  - Assess for home care needs following discharge   - Consider OT consult to assist with ADL evaluation and planning for discharge  - Provide patient education as appropriate  Outcome: Progressing  Goal: Maintains/Returns to pre admission functional level  Description: INTERVENTIONS:  - Perform BMAT or MOVE assessment daily    - Set and communicate daily mobility goal to care team and patient/family/caregiver     - Collaborate with rehabilitation services on mobility goals if consulted  -- Stand patient 3 times a day  - Ambulate patient 3  times a day  - Out of bed to chair 3 times a day   - Out of bed for meals 3   Problem: PAIN - ADULT  Goal: Verbalizes/displays adequate comfort level or baseline comfort level  Description: Interventions:  - Encourage patient to monitor pain and request assistance  - Assess pain using appropriate pain scale  - Administer analgesics based on type and severity of pain and evaluate response  - Implement non-pharmacological measures as appropriate and evaluate response  - Consider cultural and social influences on pain and pain management  - Notify physician/advanced practitioner if interventions unsuccessful or patient reports new pain  Outcome: Progressing     Problem: INFECTION - ADULT  Goal: Absence or prevention of progression during hospitalization  Description: INTERVENTIONS:  - Assess and monitor for signs and symptoms of infection  - Monitor lab/diagnostic results  - Monitor all insertion sites, i e  indwelling lines, tubes, and drains  - Monitor endotracheal if appropriate and nasal secretions for changes in amount and color  - [place] appropriate cooling/warming therapies per order  - Administer medications as ordered  - Instruct and encourage patient and family to use good hand hygiene technique  - Identify and instruct in appropriate isolation precautions for identified infection/condition  Outcome: Progressing     Problem: DISCHARGE PLANNING  Goal: Discharge to home or other facility with appropriate resources  Description: INTERVENTIONS:  - Identify barriers to discharge w/patient and caregiver  - Arrange for needed discharge resources and transportation as appropriate  - Identify discharge learning needs (meds, wound care, etc )  - Arrange for interpretive services to assist at discharge as needed  - Refer to Case Management Department for coordinating discharge planning if the patient needs post-hospital services based on physician/advanced practitioner order or complex needs related to functional status, cognitive ability, or social support system  Outcome: Progressing     Problem: Prexisting or High Potential for Compromised Skin Integrity  Goal: Skin integrity is maintained or improved  Description: INTERVENTIONS:  - Identify patients at risk for skin breakdown  - Assess and monitor skin integrity  - Assess and monitor nutrition and hydration status  - Monitor labs   - Assess for incontinence   - Turn and reposition patient  - Assist with mobility/ambulation  - Relieve pressure over bony prominences  - Avoid friction and shearing  - Provide appropriate hygiene as needed including keeping skin clean and dry  - Evaluate need for skin moisturizer/barrier cream  - Collaborate with interdisciplinary team   - Patient/family teaching  - Consider wound care consult   Outcome: Progressing     Problem: DISCHARGE PLANNING  Goal: Discharge to home or other facility with appropriate resources  Description: INTERVENTIONS:  - Identify barriers to discharge w/patient and caregiver  - Arrange for needed discharge resources and transportation as appropriate  - Identify discharge learning needs (meds, wound care, etc )  - Arrange for interpretive services to assist at discharge as needed  - Refer to Case Management Department for coordinating discharge planning if the patient needs post-hospital services based on physician/advanced practitioner order or complex needs related to functional status, cognitive ability, or social support system  Outcome: Progressing   times a day  - Out of bed for toileting  - Record patient progress and toleration of activity level   Outcome: Progressing

## 2023-04-19 NOTE — ASSESSMENT & PLAN NOTE
Lab Results   Component Value Date    HGBA1C 6 7 (A) 02/02/2023     Insulin regimen 80 units BID  Add sliding scale, algorithm 4  Continue lantus 60 units BID na

## 2023-04-19 NOTE — PLAN OF CARE
Problem: MOBILITY - ADULT  Goal: Maintain or return to baseline ADL function  Description: INTERVENTIONS:  -  Assess patient's ability to carry out ADLs; assess patient's baseline for ADL function and identify physical deficits which impact ability to perform ADLs (bathing, care of mouth/teeth, toileting, grooming, dressing, etc )  - Assess/evaluate cause of self-care deficits   - Assess range of motion  - Assess patient's mobility; develop plan if impaired  - Assess patient's need for assistive devices and provide as appropriate  - Encourage maximum independence but intervene and supervise when necessary  - Involve family in performance of ADLs  - Assess for home care needs following discharge   - Consider OT consult to assist with ADL evaluation and planning for discharge  - Provide patient education as appropriate  Outcome: Progressing  Goal: Maintains/Returns to pre admission functional level  Description: INTERVENTIONS:  - Perform BMAT or MOVE assessment daily    - Set and communicate daily mobility goal to care team and patient/family/caregiver  - Collaborate with rehabilitation services on mobility goals if consulted  - Perform Range of Motion  times a day  - Reposition patient every  hours    - Dangle patient  times a day  - Stand patient  times a day  - Ambulate patient  times a day  - Out of bed to chair  times a day   - Out of bed for meals times a day  - Out of bed for toileting  - Record patient progress and toleration of activity level   Outcome: Progressing     Problem: PAIN - ADULT  Goal: Verbalizes/displays adequate comfort level or baseline comfort level  Description: Interventions:  - Encourage patient to monitor pain and request assistance  - Assess pain using appropriate pain scale  - Administer analgesics based on type and severity of pain and evaluate response  - Implement non-pharmacological measures as appropriate and evaluate response  - Consider cultural and social influences on pain and pain management  - Notify physician/advanced practitioner if interventions unsuccessful or patient reports new pain  Outcome: Progressing     Problem: INFECTION - ADULT  Goal: Absence or prevention of progression during hospitalization  Description: INTERVENTIONS:  - Assess and monitor for signs and symptoms of infection  - Monitor lab/diagnostic results  - Monitor all insertion sites, i e  indwelling lines, tubes, and drains  - Monitor endotracheal if appropriate and nasal secretions for changes in amount and color  - [place] appropriate cooling/warming therapies per order  - Administer medications as ordered  - Instruct and encourage patient and family to use good hand hygiene technique  - Identify and instruct in appropriate isolation precautions for identified infection/condition  Outcome: Progressing     Problem: DISCHARGE PLANNING  Goal: Discharge to home or other facility with appropriate resources  Description: INTERVENTIONS:  - Identify barriers to discharge w/patient and caregiver  - Arrange for needed discharge resources and transportation as appropriate  - Identify discharge learning needs (meds, wound care, etc )  - Arrange for interpretive services to assist at discharge as needed  - Refer to Case Management Department for coordinating discharge planning if the patient needs post-hospital services based on physician/advanced practitioner order or complex needs related to functional status, cognitive ability, or social support system  Outcome: Progressing     Problem: Prexisting or High Potential for Compromised Skin Integrity  Goal: Skin integrity is maintained or improved  Description: INTERVENTIONS:  - Identify patients at risk for skin breakdown  - Assess and monitor skin integrity  - Assess and monitor nutrition and hydration status  - Monitor labs   - Assess for incontinence   - Turn and reposition patient  - Assist with mobility/ambulation  - Relieve pressure over bony prominences  - Avoid friction and shearing  - Provide appropriate hygiene as needed including keeping skin clean and dry  - Evaluate need for skin moisturizer/barrier cream  - Collaborate with interdisciplinary team   - Patient/family teaching  - Consider wound care consult   Outcome: Progressing

## 2023-04-19 NOTE — PHYSICAL THERAPY NOTE
PT EVALUATION    Pt  Name: Shaun Manuel  Pt  Age: 60 y o  MRN: [id number]  LENGTH OF STAY: 2      Admitting Diagnoses: Toe pain [M79 676]  Leg wound, right, initial encounter [S81 801A]  Right leg swelling [M79 89]    Past Medical History:   Diagnosis Date    Anxiety     Anxiety and depression     Cataract     Congestive cardiac failure (HCC)     Coronary artery disease     Depression     Diabetes mellitus ([facility] [facility] )     Diabetic autonomic neuropathy associated with type 2 diabetes mellitus ([facility] )     Last Assessed: 12/28/2017    History of DVT (deep vein thrombosis)     left LE    Hyperlipidemia     Lymphedema of right lower extremity     Obesity     Orthostatic hypotension        Past Surgical History:   Procedure Laterality Date    CORONARY ARTERY BYPASS GRAFT      EYE SURGERY      cataracts bilateral    LEG AMPUTATION THROUGH LOWER TIBIA AND FIBULA Left 8/3/2018    Procedure: AMPUTATION BELOW KNEE (BKA) L BKA;  Surgeon: Charline Henderson MD;  [facility];  Service: Vascular    DE CORONARY ARTERY BYP W/VEIN & ARTERY GRAFT 4 VEIN N/A 3/28/2018    Procedure: CORONARY ARTERY BYPASS GRAFT (CABG) x3 VESSELS, LIMA TO LAD, SVG--> PDA, SVG--> OM2,  RIGHT LEG EVH/SVH TO , INTRA-OP AMANDEEP;  Surgeon: Ruy Amaro MD;  [facility];  Service: Cardiac Surgery    ROTATOR CUFF REPAIR Bilateral        Imaging Studies:  VAS lower limb arterial duplex, limited, unilateral   Final Result by Domenic Villanueva MD (04/18 8991)      XR ankle 3+ views RIGHT   ED Interpretation by Lea Dunn DO (04/17 2149)   No acute fractures      Final Result by Hai Grant MD (04/18 7927)      No acute osseous abnormality  Workstation performed: [id number]         XR foot 3+ views RIGHT   ED Interpretation by eLa uDnn DO (04/17 2147)   Big toe fracture distal end      Final Result by Hai Grant MD (04/18 6285)      Nondisplaced fracture, terminal tuft of the right 1st toe  Findings concur with the referring clinician's preliminary interpretation already in the patient's electronic health record  Workstation performed: [id number]         MRI ankle/heel right wo contrast    (Results Pending)         04/19/23 0827   PT Last Visit   PT Visit Date 04/19/23   Note Type   Note type Evaluation   Pain Assessment   Pain Score No Pain   Restrictions/Precautions   Weight Bearing Precautions Per Order Yes   RLE Weight Bearing Per Order WBAT   Braces or Orthoses Other (Comment)  (LLE Prosthesis)   Other Precautions Multiple lines; Fall Risk   Home [facility]   [facility]; Two level;Performs ADLs on one level;1/2 bath on main level  (1 curb + 3STE w/ 1 HR; pt has 1st flr set up w/ 1/2 bath access)   [place]; Wheelchair-manual;Reacher   Prior Function   Level of [place] Independent with functional mobility;Modified independent with wheelchair; Independent with ADLs   Lives With Spouse  (who works)   [place] 85 in the last 6 months 0   Vocational On disability   Comments Pt reports able to ambulate approx  30'x1 w/ RW from curb to front door but uses w/c inside home & for community distances  Home alone during the day  Pt reports increasing difficulty navigating VITOR & toileting  Pt also reports sleeping in recliner/lift chair at baseline  General   Additional Pertinent History h/o L BKA; RLE lymphedema   Family/Caregiver Present No   Cognition   Overall Cognitive Status WFL   Arousal/Participation Alert   Orientation Level Oriented to person;Oriented to place;Oriented to time   Following Commands Follows multistep commands without difficulty   Comments pleasant & cooperative   Subjective   Subjective Pt agreeable to PT/OT evals     RUE Assessment   RUE Assessment   (refer to OT)   LUE Assessment   LUE Assessment   (refer to OT)   RLE Assessment   RLE Assessment WFL  (4-/5 grossly except ankle (+) foot drop)   LLE Assessment   LLE Assessment WFL  (4-/5 grossly)   Coordination   Sensation X  (pt reports no sensation to R leg)   Bed Mobility   Supine to Sit Unable to assess   Sit to Supine Unable to assess   Additional Comments pt OOB in chair pre & post-session   Transfers   Sit to Stand 3  Moderate assistance   Additional items Assist x 2; Increased time required;Verbal cues;Armrests   Stand to Sit 3  Moderate assistance   Additional items Assist x 2;Armrests; Increased time required;Verbal cues   Additional Comments cues for techniques & safety   Ambulation/Elevation   Gait pattern Wide ROBBY; Forward Flexion;Decreased foot clearance;R Foot drag;Short stride; Step to;Excessively slow   Gait Assistance 4  Minimal assist   Additional items Assist x 2;Verbal cues; Tactile cues   Assistive Device Rolling walker   Distance lateral stepping R<>L, 2-3'x1   Ambulation/Elevation Additional Comments no gross LOB noted; limit amb 2* to condition of R leg wound   Balance   Static Sitting Fair +   Dynamic Sitting Fair   Static Standing Fair -   Dynamic Standing Poor +   Ambulatory Poor   Activity Tolerance   Activity Tolerance Patient tolerated treatment well   Medical Staff Made Aware OTR [address] [facility]   Nurse Made Aware RN Itzel   Assessment   Prognosis Fair   Problem List Decreased strength;Decreased endurance; Impaired balance;Decreased mobility; Impaired judgement; Impaired sensation;Obesity; Decreased skin integrity   Assessment Pt  62 y o male w/ PMHx of L BKA, significant neuropathy, uncontrolled diabetes type 2, PAD, CAD, severe obesity, and lymphedema  Pt presented after hitting his R toe resulting in nail avulsion & bleeding  Pt reportedly had been wearing compression stocking to RLE for 3 weeks & got bunched up & tourniquet around his right ankle  R ankle revealed a severe ankle wound w/ exposed tendon & significant edema   Pt admitted for R ankle wound w/ cellulitis w/ R hallux wound 2* to injury & RLE "lymphedema  MRI result pending read  Pt referred to PT for mobility assessment & D/C planning w/ orders of WBAT & to ambulate pt  Please see above for information re: home set-up & PLOF as well as objective findings during PT assessment  On eval, pt functioning below baseline hence will continue skilled PT to improve function & safety  Pt require modAx2 for transfers & minAx2 for amb w/ RW & LLE prosthesis + cues for techniques & safety  Gait deviations as above, slow w/ dec foot clearance & R foot drag 2* to chronic foot drop but no gross LOB noted  Limit amb 2* to condition of R leg wound & for safety  The patient's AM-PAC Basic Mobility Inpatient Short Form Raw Score is 12  A Raw score of less than or equal to 16 suggests the patient may benefit from discharge to post-acute rehabilitation services  Please also refer to the recommendation of the Physical Therapist for safe discharge planning  From PT standpoint, due to above mentioned deficits & high risk for falls, pt will benefit from inpt rehab at D/C  No SOB & dizziness reported t/o session  Nsg staff most recent vital signs as follows: /69   Pulse 75   Temp 98 °F (36 7 °C) (Oral)   Resp 16   Ht 6' 1\" (1 854 m)   Wt 126 kg (277 lb 5 4 oz)   SpO2 96%   BMI 36 59 kg/m²   At end of session, pt remain OOB in chair in stable condition, call bell & phone in reach  Fall precautions reinforced w/ good understanding  CM to follow  Nsg staff to continue to mobilized pt (OOB in chair for all meals & ambulate in room/unit) as tolerated to prevent further decline in function  Nsg notified  Co-eval was necessary to complete this PT eval for the pt's best interest given pt's medical acuity & complexity     Barriers to Discharge Inaccessible home environment;Decreased caregiver support   Barriers to Discharge Comments VITOR; home alone during the day   Goals   Patient Goals to get better   STG Expiration Date 05/03/23   Short Term Goal #1 Goals to be met in 14 days; " pt will be able to: 1) inc strength & balance by 1/2 grade to improve overall functional mobility & dec fall risk; 2) inc transfers to S for pt to transition safely from one surface to another w/o % of the time, to dec caregiver burden & safely function at home; 3) inc amb w/ RW approx  >50' w/ S for pt to ambulate as tolerated w/o any % of the time, to dec caregiver burden & safely function at home; 4) negotiate stairs w/ minAx1 for inc safety during stair mgt inside/outside of home & dec caregiver burden; 5) modified I w/ w/c mobility & management on level surfaces approx  >350'; 6) pt/caregiver ed   PT Treatment Day 0   Plan   Treatment/Interventions Functional transfer training;LE strengthening/ROM; Elevations; Therapeutic exercise; Endurance training;Patient/family training;Bed mobility;Gait training;Spoke to nursing;OT   PT Frequency 3-5x/wk   Recommendation   PT Discharge Recommendation Post acute rehabilitation services   Equipment Recommended Walker; Wheelchair  (pt has)   [place] 8 in Flat Bed Without Bedrails 3   Lying on Back to Sitting on Edge of Flat Bed Without Bedrails 2   Moving Bed to Chair 2   Standing Up From Chair Using Arms 2   Walk in Room 2   Climb 3-5 Stairs With Railing 1   Basic Mobility Inpatient Raw Score 12   Basic Mobility Standardized Score 32 23   Highest Level Of Mobility   -HL Goal 4: Move to chair/commode   -HLM Achieved 5: Stand (1 or more minutes)   End of Consult   Patient Position at End of Consult Bedside chair; All needs within reach   End of Consult Comments Pt in stable condition  All needs in reach     Hx/personal factors: co-morbidities, inaccessible home, dec caregiver support, use of AD, fall risk, assist w/ ADL's, and obesity  Examination: dec mobility, dec balance, dec endurance, dec amb, risk for falls  Clinical: unpredictable (ongoing medical status, abnormal lab values, and risk for falls)  Complexity: high    Stuart Kendrawell

## 2023-04-19 NOTE — OCCUPATIONAL THERAPY NOTE
Occupational Therapy Evaluation     Patient Name: Nancy Munoz  Today's Date: 4/19/2023  Problem List  Principal Problem:    Cellulitis of right ankle  Active Problems:    Chronic diastolic heart failure (HCC)    Diabetic autonomic neuropathy associated with type 2 diabetes mellitus (HCC)    CAD (coronary artery disease)    Chronic venous insufficiency    Past Medical History  Past Medical History:   Diagnosis Date    Anxiety     Anxiety and depression     Cataract     Congestive cardiac failure ([facility] [address] [facility] )     Coronary artery disease     Depression     Diabetes mellitus ([facility] [address] )     Diabetic autonomic neuropathy associated with type 2 diabetes mellitus ([facility] [address] )     Last Assessed: 12/28/2017    History of DVT (deep vein thrombosis)     left LE    Hyperlipidemia     Lymphedema of right lower extremity     Obesity     Orthostatic hypotension      Past Surgical History  Past Surgical History:   Procedure Laterality Date    CORONARY ARTERY BYPASS GRAFT      EYE SURGERY      cataracts bilateral    LEG AMPUTATION THROUGH LOWER TIBIA AND FIBULA Left 8/3/2018    Procedure: AMPUTATION BELOW KNEE (BKA) L BKA;  Surgeon: Martine Lares MD;  [facility];  Service: Vascular    IN CORONARY ARTERY BYP W/VEIN & ARTERY GRAFT 4 VEIN N/A 3/28/2018    Procedure: CORONARY ARTERY BYPASS GRAFT (CABG) x3 VESSELS, LIMA TO LAD, SVG--> PDA, SVG--> OM2,  RIGHT LEG EVH/SVH TO , INTRA-OP AMANDEEP;  Surgeon: Kym Zhou MD;  [facility];  Service: Cardiac Surgery    ROTATOR CUFF REPAIR Bilateral         04/19/23 0811   OT Last Visit   OT Visit Date 04/19/23   Note Type   Note type Evaluation   Pain Assessment   Pain Assessment Tool 0-10   Pain Score No Pain   Restrictions/Precautions   Weight Bearing Precautions Per Order Yes   RLE Weight Bearing Per Order WBAT  (with surgical shoe)   Braces or Orthoses Other (Comment)  (LLE prosthetic, R surgical shoe)   Other Precautions Multiple lines; Fall Risk   Home Living   Type of [place] [id number] "Layout Stairs to enter with rails; Two level;Performs ADLs on one level;1/2 bath on main level   Bathroom Shower/Tub   (sponge bathes)   [place]; Wheelchair-manual;Reacher; Other (Comment)  (Lift chair)   Prior Function   Level of [place] Independent with functional mobility;Modified independent with wheelchair;Needs assistance with ADLs   Lives With Spouse  (works 2 jobs)   Receives Help From Family   IADLs Independent with meal prep; Independent with medication management; Family/Friend/Other provides transportation   Falls in the last 6 months 0   Vocational On disability   Comments (+) home alone  Subjective   Subjective \"I'm just so weak\"   ADL   Where Assessed Chair   Eating Assistance 6  Modified independent   Grooming Assistance 5  Supervision/Setup   UB Bathing Assistance 5  Supervision/Setup   LB Bathing Assistance 2  Maximal Assistance   UB Dressing Assistance 5  Supervision/Setup   LB Dressing Assistance 2  Maximal [address]  1  Total Assistance   Bed Mobility   Additional Comments OOB to chair pre and post session   Transfers   Sit to Stand 3  Moderate assistance   Additional items Assist x 2; Increased time required;Verbal cues;Armrests   Stand to Sit 3  Moderate assistance   Additional items Assist x 2; Increased time required;Verbal cues;Armrests   Functional Mobility   Functional Mobility 4  Minimal assistance   Additional Comments x2   Additional items Rolling walker   Balance   Static Sitting Fair +   Dynamic Sitting Fair   Static Standing Fair -   Dynamic Standing Poor +   Ambulatory Poor   Activity Tolerance   Activity Tolerance Patient tolerated treatment well;Treatment limited secondary to medical complications (Comment)   Medical Staff Made Aware YOLANDA Davidson   Nurse Made Aware Itzel R[place]   RUE Assessment   RUE Assessment X   RUE Strength   R Shoulder Flexion 4-/5   R Shoulder Extension 3+/5   R Elbow Flexion 4/5   R Elbow Extension " 4/5   LUE Assessment   LUE Assessment X   LUE Strength   L Shoulder Flexion 4-/5   L Shoulder Extension 3+/5   L Elbow Flexion 4/5   L Elbow Extension 4/5   Hand Function   Gross Motor Coordination Functional   Fine Motor Coordination Functional   Sensation   Light Touch Severe deficits in the RLE   Proprioception   Proprioception No apparent deficits   Vision-Basic Assessment   Current Vision No visual deficits   Vision - Complex Assessment   Ocular Range of Motion Intact   Perception   Inattention/Neglect Appears intact   Cognition   Overall Cognitive Status WFL   Arousal/Participation Alert; Responsive; Cooperative   Attention Within functional limits   Orientation Level Oriented X4   Memory Within functional limits   Following Commands Follows multistep commands without difficulty   Assessment   Limitation Decreased ADL status; Decreased UE strength;Decreased UE ROM; Decreased Safe judgement during ADL;Decreased sensation;Decreased endurance;Decreased self-care trans;Decreased high-level ADLs   Prognosis Fair   Assessment Patient is a 60y o  year old male seen for OT eval s/p [facility]23 with toe pain, R leg wound, R LE swelling; WBAT in surgical shoe to RLE  Comorbidities affecting pts functional performance include a significant PMH of: hx of L BKA with prosthetic  Patient with active OT orders and activity orders for Up and OOB as tolerated  Personal factors affecting pt at time of IE include: difficulty performing ADLs and IADLs, difficulty with functional mobility/transfers  Prior to admission, pt requires (A) for LB bathing/dressing, (I) with UB bathing/dressing, (A) for toileting (decreased shoulder extension of b/l UEs)  W/c level or ambulating short distances in the home with RW   Upon evaluation, patients functional status as follows: eating: MOD I, grooming: SBA, UB bathing: SBA, LB bathing: MAX A, UB dressing: SBA, LB dressing: MAX A, toileting: dependent; functional transfers: MOD Ax2, bed mobility: DNT, functional mobility: MIN A x2 with RW, sitting/standing tolerance: ~30 seconds with b/l UE support- due to the following deficits impacting occupational performance: decreased B UE strength, decreased static/dynamic sitting/standing balance, decreased activity tolerance, decreased safety awareness, and increased pain  Patient would benefit from continued skilled OT therapy while in acute setting to address deficits as defined above and maximize (I) with ADLs and functional mobility  Occupational performance areas to address include: grooming, bathing, toileting, UB/LB dressing, functional mobility, household maintenance, and medication management  Based on the aforementioned OT evaluation, functional performance deficits, and assessments, pt has been identified as a high complexity evaluation    Co treatment with PT secondary to complex medical condition of pt, possible A of 2 required to achieve and maintain transitional movements, requiring the need of skilled therapeutic intervention of 2 therapists to achieve delivery of services  Goals   Patient Goals to get stronger and build endurance   LTG Time Frame 10-14   Plan   Treatment Interventions ADL retraining;Functional transfer training;UE strengthening/ROM; Endurance training;Patient/family training;Equipment evaluation/education; Compensatory technique education;Continued evaluation; Energy conservation; Activityengagement   Goal Expiration Date 05/03/23   OT Treatment Day 0   OT Frequency 2-3x/wk;3-5x/wk   Recommendation   OT Discharge Recommendation Post acute rehabilitation services   Additional Comments  The patient's raw score on the AM-PAC Daily Activity Inpatient Short Form is 15  A raw score of less than 19 suggests the patient may benefit from discharge to post-acute rehabilitation services  Please refer to the recommendation of the Occupational Therapist for safe discharge planning     AM-PAC Daily Activity Inpatient   Lower Body Dressing 2   Bathing 2   Toileting 1   Upper Body Dressing 3   Grooming 3   Eating 4   Daily Activity Raw Score 15   Daily Activity Standardized Score (Calc for Raw Score >=11) 34 69   AM-PAC Applied Cognition Inpatient   Following a Speech/Presentation 4   Understanding Ordinary Conversation 4   Taking Medications 3   Remembering Where Things Are Placed or Put Away 3   Remembering List of 4-5 Errands 3   Taking Care of Complicated Tasks 3   Applied Cognition Raw Score 20   Applied Cognition Standardized Score 41 76     Pt will complete LB dressing/bathing with AE PRN Joan  Pt will complete toileting tasks with AE PRN and modA  Pt will increase standing tolerance to 5 minutes in order to participate in ADL/IADL tasks  Pt will complete functional transfers with Joan utilizing appropriate AD PRN  Patient will verbalize and demonstrate use of energy conservation/deep breathing technique and work simplification skills during functional activity with no verbal cues     Patient will verbalize and demonstrate good body mechanics and joint protection techniques during ADLs/IADLs with no verbal cues   Pt will improve functional mobility during ADL/IADL/leisure tasks to MinAx1 using DME as needed w/ G balance/safety    Pt will participate in simulated IADL management task to increase independence to Mod I w/ G safety and endurance   Pt will increase BUE strength by 1MM grade via AROM/AAROM/PROM exercises to increase independence in ADLs and transfers      [facility] THE [facility], OTR/L

## 2023-04-20 LAB
BACTERIA WND AEROBE CULT: ABNORMAL
GLUCOSE SERPL-MCNC: 101 MG/DL (ref 65–140)
GLUCOSE SERPL-MCNC: 103 MG/DL (ref 65–140)
GLUCOSE SERPL-MCNC: 107 MG/DL (ref 65–140)
GLUCOSE SERPL-MCNC: 181 MG/DL (ref 65–140)
GRAM STN SPEC: ABNORMAL
GRAM STN SPEC: ABNORMAL

## 2023-04-20 RX ORDER — METOCLOPRAMIDE 10 MG/1
5 TABLET ORAL
Status: DISCONTINUED | OUTPATIENT
Start: 2023-04-20 | End: 2023-04-22

## 2023-04-20 RX ORDER — CEPHALEXIN 500 MG/1
500 CAPSULE ORAL EVERY 6 HOURS SCHEDULED
Status: COMPLETED | OUTPATIENT
Start: 2023-04-21 | End: 2023-04-27

## 2023-04-20 RX ADMIN — METOCLOPRAMIDE 5 MG: 10 TABLET ORAL at 13:00

## 2023-04-20 RX ADMIN — FINASTERIDE 5 MG: 5 TABLET, FILM COATED ORAL at 08:47

## 2023-04-20 RX ADMIN — ENOXAPARIN SODIUM 40 MG: 100 INJECTION SUBCUTANEOUS at 08:47

## 2023-04-20 RX ADMIN — CLOPIDOGREL BISULFATE 75 MG: 75 TABLET ORAL at 08:47

## 2023-04-20 RX ADMIN — CEFAZOLIN SODIUM 2000 MG: 2 SOLUTION INTRAVENOUS at 13:02

## 2023-04-20 RX ADMIN — PANTOPRAZOLE SODIUM 40 MG: 40 TABLET, DELAYED RELEASE ORAL at 05:31

## 2023-04-20 RX ADMIN — ASPIRIN 81 MG: 81 TABLET, COATED ORAL at 08:47

## 2023-04-20 RX ADMIN — FUROSEMIDE 40 MG: 40 TABLET ORAL at 17:38

## 2023-04-20 RX ADMIN — INSULIN LISPRO 2 UNITS: 100 INJECTION, SOLUTION INTRAVENOUS; SUBCUTANEOUS at 11:47

## 2023-04-20 RX ADMIN — TAMSULOSIN HYDROCHLORIDE 0.4 MG: 0.4 CAPSULE ORAL at 15:45

## 2023-04-20 RX ADMIN — ONDANSETRON HYDROCHLORIDE 4 MG: 2 SOLUTION INTRAMUSCULAR; INTRAVENOUS at 05:31

## 2023-04-20 RX ADMIN — INSULIN GLARGINE 60 UNITS: 100 INJECTION, SOLUTION SUBCUTANEOUS at 08:47

## 2023-04-20 RX ADMIN — CEFAZOLIN SODIUM 2000 MG: 2 SOLUTION INTRAVENOUS at 22:20

## 2023-04-20 RX ADMIN — CEFAZOLIN SODIUM 2000 MG: 2 SOLUTION INTRAVENOUS at 05:30

## 2023-04-20 RX ADMIN — FUROSEMIDE 40 MG: 40 TABLET ORAL at 08:47

## 2023-04-20 RX ADMIN — INSULIN GLARGINE 60 UNITS: 100 INJECTION, SOLUTION SUBCUTANEOUS at 22:14

## 2023-04-20 NOTE — PROGRESS NOTES
"[facility] Podiatry - Progress Note  Patient: Blayne Justin 60 y o  male   MRN: [id number]  PCP: Lopez Salguero DO  Unit/Bed#: E5 -01 Encounter: [id number]  Date Of Visit: 23  Hospital Stay Days: 3    ASSESSMENT:    Blayne Justin is a 60 y o  male with:    1  Right ankle wound with cellulitis  2  Right great toe wound secondary to injury  3  H/o left BKA  4  PAD  5  Uncontrolled Type 2 Diabetes  6  RLE lymphedema      PLAN:      Right lower extremity dressings changed  At baseline no acute signs of infection   We will continue local wound care no surgical intervention indicated   MRI reviewed negative for osteomyelitis   Local wound care consisting of Maxorb, DSD with even compression from base of toes to tibial tuberosity   Discussed: Need for wound care follow-up, VNA nursing, lymphedema clinic etc    Arterial studies within healing   Elevate and offload lower extremity as much as possible   Rest of care per primary team    Weightbearing status: as tolerated in surgical shoe      SUBJECTIVE:     The patient was seen, evaluated, and assessed at bedside today  The patient was awake, alert, and in no acute distress  No acute events overnight  The patient reports feeling well  He is slightly depressed because he does not have any help at home with his wife being unable to change the dressings  States he usually lives in a [place] apartment which he rents    Patient denies N/V/F/chills/SOB/CP  OBJECTIVE:     Vitals:   /60   Pulse 80   Temp (!) 97 4 °F (36 3 °C) (Oral)   Resp 18   Ht 6' 1\" (1 854 m)   Wt 126 kg (278 lb 3 5 oz)   SpO2 97%   BMI 36 71 kg/m²     Temp (24hrs), Av 5 °F (36 4 °C), Min:97 4 °F (36 3 °C), Max:97 6 °F (36 4 °C)      Physical Exam :     General:  Alert, cooperative, and in no distress    Lower extremity exam:  Neurologic, Vascular, musculoskeletal status at baseline    Dermatologic: Severe lymphedema with lichenification of lower extremity from the tibial " tuberosity down to and including the digits  No surrounding erythema no active drainage  Negative for heat, malodor or pain  Edema remains significant as at baseline 4+  Wound located right ankle is circumferential sixteen 1 x 5 x 2 cm deepest tissue ranges tendon with negative probe to bone peripheral skin is attached granulation is approximately 0%  Drainage is mild serous  Negative for signs of infection      Clinical Images 04/20/23: Additional Data:     Labs:    Results from last 7 days   Lab Units 04/19/23  0450   WBC Thousand/uL 8 89   HEMOGLOBIN g/dL 12 2   HEMATOCRIT % 38 5   PLATELETS Thousands/uL 265   NEUTROS PCT % 69   LYMPHS PCT % 21   MONOS PCT % 6   EOS PCT % 3     Results from last 7 days   Lab Units 04/19/23  0450   POTASSIUM mmol/L 3 9   CHLORIDE mmol/L 97   CO2 mmol/L 30   BUN mg/dL 15   CREATININE mg/dL 1 00   CALCIUM mg/dL 8 8   ALK PHOS U/L 35   ALT U/L 9   AST U/L 12*           * I Have Reviewed All Lab Data Listed Above  Recent Cultures (last 7 days):     Results from last 7 days   Lab Units 04/17/23  2139   GRAM STAIN RESULT  2+ Polys*  2+ Gram negative rods*   WOUND CULTURE  2+ Growth of - Pantoea septica Pantoea species*           Imaging: I have personally reviewed pertinent films in PACS  XR ankle 3+ views RIGHT    Result Date: 4/18/2023  Impression: No acute osseous abnormality  Workstation performed: [id number]     XR foot 3+ views RIGHT    Result Date: 4/18/2023  Impression: Nondisplaced fracture, terminal tuft of the right 1st toe  Findings concur with the referring clinician's preliminary interpretation already in the patient's electronic health record  Workstation performed: [id number]     MRI ankle/heel right wo contrast    Result Date: 4/20/2023  Impression: Severe subcutaneous edema with ulceration seen medially as above  There is mild marrow edema along the medial malleolus, nonspecific with differential considerations as above    No confluent "decreased T1 marrow signal to definitively suggest osteomyelitis at this time  Workstation performed: [id number]     EKG, Pathology, and Other Studies: I have personally reviewed pertinent reports  Active medications:  Current Facility-Administered Medications   Medication Dose Route Frequency    acetaminophen (TYLENOL) tablet 650 mg  650 mg Oral Q6H PRN    aluminum-magnesium hydroxide-simethicone (MYLANTA) oral suspension 30 mL  30 mL Oral Q6H PRN    aspirin (ECOTRIN LOW STRENGTH) EC tablet 81 mg  81 mg Oral Daily    atorvastatin (LIPITOR) tablet 80 mg  80 mg Oral After Dinner    ceFAZolin (ANCEF) IVPB (premix in dextrose) 2,000 mg 50 mL  2,000 mg Intravenous Q8H    clopidogrel (PLAVIX) tablet 75 mg  75 mg Oral Daily    enoxaparin (LOVENOX) subcutaneous injection 40 mg  40 mg Subcutaneous Daily    finasteride (PROSCAR) tablet 5 mg  5 mg Oral Daily    furosemide (LASIX) tablet 40 mg  40 mg Oral BID    insulin glargine (LANTUS) subcutaneous injection 60 Units 0 6 mL  60 Units Subcutaneous Q12H [place]    insulin lispro (HumaLOG) 100 units/mL subcutaneous injection 2-12 Units  2-12 Units Subcutaneous 4x Daily (AC & HS)    ondansetron (ZOFRAN) injection 4 mg  4 mg Intravenous Q6H PRN    pantoprazole (PROTONIX) EC tablet 40 mg  40 mg Oral Early Morning    polyethylene glycol (MIRALAX) packet 17 g  17 g Oral Daily    tamsulosin (FLOMAX) capsule 0 4 mg  0 4 mg Oral Daily With Dinner         ** Please Note: Portions of the record may have been created with voice recognition software  Occasional wrong word or \"sound a like\" substitutions may have occurred due to the inherent limitations of voice recognition software  Read the chart carefully and recognize, using context, where substitutions have occurred   **      "

## 2023-04-20 NOTE — ASSESSMENT & PLAN NOTE
Lab Results   Component Value Date    HGBA1C 6 7 (A) 02/02/2023     Insulin regimen 80 units BID  Add sliding scale, algorithm 4  Continue lantus 60 units BID

## 2023-04-20 NOTE — ASSESSMENT & PLAN NOTE
Presents with RLE foot wound, compression stocking tourniquet around R ankle with exposed tendon, cellulitic changes   Concern for osteomyelitis versus cellulitis   Continue IV Ancef, wound cx growing GNRs pantoea species susceptible to cephalosporin   MRI showing diffuse subcutaneous edema without definitive osteomyelitis   Lead study shows diffuse disease of right lower extremity   Appreciate vascular surgery input, no intervention at this time, patient with high risk of limb loss in the future   Podiatry following, pending further evaluation   PT and OT recommending rehab once medically cleared   Transition to p o  antibiotics once podiatry completes evaluation

## 2023-04-20 NOTE — PROGRESS NOTES
[address] [place] [address]  Progress Note  Name: Devorah Aden  MRN: [id number]  Unit/Bed#: E5 -01 I Date of Admission: 4/17/2023   Date of Service: 4/20/2023 I Hospital Day: 3    Assessment/Plan   CAD (coronary artery disease)  Assessment & Plan  Continue statin, plavix    Diabetic autonomic neuropathy associated with type 2 diabetes mellitus [facility])  Assessment & Plan  Lab Results   Component Value Date    HGBA1C 6 7 (A) 02/02/2023     Insulin regimen 80 units BID  Add sliding scale, algorithm 4  Continue lantus 60 units BID    Chronic diastolic heart failure (HCC)  Assessment & Plan  Wt Readings from Last 3 Encounters:   04/20/23 126 kg (278 lb 3 5 oz)   09/09/21 127 kg (280 lb)   06/09/21 127 kg (280 lb)     Appears euvolemic on admission, chronic lymphedema    Prior 2D echo reviewed, normal EF   Continue lasix 40mg daily    * Cellulitis of right ankle  Assessment & Plan  Presents with RLE foot wound, compression stocking tourniquet around R ankle with exposed tendon, cellulitic changes   Concern for osteomyelitis versus cellulitis   Continue IV Ancef, wound cx growing GNRs pantoea species susceptible to cephalosporin   MRI showing diffuse subcutaneous edema without definitive osteomyelitis   Lead study shows diffuse disease of right lower extremity   Appreciate vascular surgery input, no intervention at this time, patient with high risk of limb loss in the future   Podiatry following, pending further evaluation   PT and OT recommending rehab once medically cleared   Transition to p o  antibiotics once podiatry completes evaluation           VTE Pharmacologic Prophylaxis:   Pharmacologic: Enoxaparin (Lovenox)  Mechanical VTE Prophylaxis in Place: Yes    Patient Centered Rounds: I have performed bedside rounds with nursing staff today      Current Length of Stay: 3 day(s)    Current Patient Status: Inpatient   Certification Statement: The patient will continue to require additional inpatient hospital stay due to IV abx, await podiatry input    Discharge Plan: pending, recommend rehab    Code Status: Level 1 - Full Code      Subjective:   No events overnight  Denies fevers, chills, CP or SOB  Tolerating diet  Objective:     Vitals:   Temp (24hrs), Av 5 °F (36 4 °C), Min:97 4 °F (36 3 °C), Max:97 6 °F (36 4 °C)    Temp:  [97 4 °F (36 3 °C)-97 6 °F (36 4 °C)] 97 4 °F (36 3 °C)  HR:  [71-80] 80  Resp:  [16-18] 18  BP: (114-121)/(53-64) 114/60  SpO2:  [93 %-97 %] 97 %  Body mass index is 36 71 kg/m²  Input and Output Summary (last 24 hours): Intake/Output Summary (Last 24 hours) at 2023 1102  Last data filed at 2023 8165  Gross per 24 hour   Intake 360 ml   Output 400 ml   Net -40 ml       Physical Exam:     Physical Exam  Vitals and nursing note reviewed  Constitutional:       General: He is not in acute distress  Appearance: He is well-developed  He is obese  HENT:      Head: Normocephalic and atraumatic  Eyes:      General: No scleral icterus  Conjunctiva/sclera: Conjunctivae normal    Cardiovascular:      Rate and Rhythm: Normal rate and regular rhythm  Pulmonary:      Effort: Pulmonary effort is normal  No respiratory distress  Abdominal:      Palpations: Abdomen is soft  Tenderness: There is no abdominal tenderness  Musculoskeletal:         General: No swelling  Comments: RLE lymphedema, dry skin, exposed tendon   Skin:     General: Skin is warm and dry  Neurological:      Mental Status: He is alert  Mental status is at baseline     Psychiatric:         Mood and Affect: Mood normal          Additional Data:     Labs:    Results from last 7 days   Lab Units 23  0450   WBC Thousand/uL 8 89   HEMOGLOBIN g/dL 12 2   HEMATOCRIT % 38 5   PLATELETS Thousands/uL 265   NEUTROS PCT % 69   LYMPHS PCT % 21   MONOS PCT % 6   EOS PCT % 3     Results from last 7 days   Lab Units 23  0450   SODIUM mmol/L 134*   POTASSIUM mmol/L 3 9 CHLORIDE mmol/L 97   CO2 mmol/L 30   BUN mg/dL 15   CREATININE mg/dL 1 00   ANION GAP mmol/L 7   CALCIUM mg/dL 8 8   ALBUMIN g/dL 3 2*   TOTAL BILIRUBIN mg/dL 0 44   ALK PHOS U/L 35   ALT U/L 9   AST U/L 12*   GLUCOSE RANDOM mg/dL 131         Results from last 7 days   Lab Units 04/20/23  0729 04/19/23  2131 04/19/23  1558 04/19/23  1116 04/19/23  0657 04/18/23  2203 04/18/23  1606 04/18/23  1110 04/18/23  0725   POC GLUCOSE mg/dl 103 141* 167* 202* 136 158* 224* 184* 175*                   * I Have Reviewed All Lab Data Listed Above  * Additional Pertinent Lab Tests Reviewed: All Labs For Current Hospital Admission Reviewed    Imaging:    XR ankle 3+ views RIGHT    Result Date: 4/18/2023  Impression: No acute osseous abnormality  Workstation performed: [id number]     XR foot 3+ views RIGHT    Result Date: 4/18/2023  Impression: Nondisplaced fracture, terminal tuft of the right 1st toe  Findings concur with the referring clinician's preliminary interpretation already in the patient's electronic health record  Workstation performed: [id number]     MRI ankle/heel right wo contrast    Result Date: 4/20/2023  Impression: Severe subcutaneous edema with ulceration seen medially as above  There is mild marrow edema along the medial malleolus, nonspecific with differential considerations as above  No confluent decreased T1 marrow signal to definitively suggest osteomyelitis at this time   Workstation performed: [id number]       Recent Cultures (last 7 days):     Results from last 7 days   Lab Units 04/17/23 2139   GRAM STAIN RESULT  2+ Polys*  2+ Gram negative rods*   WOUND CULTURE  2+ Growth of - Pantoea septica Pantoea species*       Last 24 Hours Medication List:   Current Facility-Administered Medications   Medication Dose Route Frequency Provider Last Rate    acetaminophen  650 mg Oral Q6H PRN Julia Baptise, PA-C      aluminum-magnesium hydroxide-simethicone  30 mL Oral Q6H PRN Julia Baptise, PA-C      aspirin 81 mg Oral Daily [place] Tamie, PA-C      atorvastatin  80 mg Oral After Den ANNY Haider-JESSICA      cefazolin  2,000 mg Intravenous Q8H Adelaide Willett, PA-C 2,000 mg (04/20/23 0530)    clopidogrel  75 mg Oral Daily [place] Willett, PA-C      enoxaparin  40 mg Subcutaneous Daily [place] Willett, PA-C      finasteride  5 mg Oral Daily [place] Willett, PA-C      furosemide  40 mg Oral BID Adelaide Willett, PA-C      insulin glargine  60 Units Subcutaneous Q12H [address] Amrik Loco MD      insulin lispro  2-12 Units Subcutaneous 4x Daily (AC & HS) Adelaide Willett, PA-C      ondansetron  4 mg Intravenous Q6H PRN Adelaide Willett, PA-C      pantoprazole  40 mg Oral Early Morning [place] Tamie, PA-C      polyethylene glycol  17 g Oral Daily Adelaide Willett, PA-C      tamsulosin  0 4 mg Oral Daily With Dinner ANNY Jade-C          Today, Patient Was Seen By: Erin Camejo MD    ** Please Note: Dictation voice to text software may have been used in the creation of this document   **

## 2023-04-20 NOTE — DISCHARGE INSTR - OTHER ORDERS
Discharge Instructions - Podiatry    Weight Bearing Status: Weight bearing as tolerated RLE                   Pain: Continue analgesics as directed    Follow-up appointment instructions: Please make an appointment within one week of discharge with Dr Nereida Ayala  Contact sooner if any increase in pain, or signs of infection occur    Wound Care: Keep dressings clean, dry, and intact between professional dressing changes  If dressing get wet, soiled or removed please replace it    Nursing Instructions: Please wash right lower extremity with soap and water, irrigate with sterile saline pat dry and apply Maxorb, DSD to ankle wound  Betadine to the right great toe  Wrap with continuous even compression dressing from the base of toes to the tibial tuberosity  Wash the right lower extremity with NSS or wound wash when changing the dressing  Dressing should be changed daily due to drainage

## 2023-04-20 NOTE — ASSESSMENT & PLAN NOTE
Wt Readings from Last 3 Encounters:   04/20/23 126 kg (278 lb 3 5 oz)   09/09/21 127 kg (280 lb)   06/09/21 127 kg (280 lb)     Appears euvolemic on admission, chronic lymphedema    Prior 2D echo reviewed, normal EF   Continue lasix 40mg daily

## 2023-04-20 NOTE — PLAN OF CARE
Problem: MOBILITY - ADULT  Goal: Maintain or return to baseline ADL function  Description: INTERVENTIONS:  -  Assess patient's ability to carry out ADLs; assess patient's baseline for ADL function and identify physical deficits which impact ability to perform ADLs (bathing, care of mouth/teeth, toileting, grooming, dressing, etc )  - Assess/evaluate cause of self-care deficits   - Assess range of motion  - Assess patient's mobility; develop plan if impaired  - Assess patient's need for assistive devices and provide as appropriate  - Encourage maximum independence but intervene and supervise when necessary  - Involve family in performance of ADLs  - Assess for home care needs following discharge   - Consider OT consult to assist with ADL evaluation and planning for discharge  - Provide patient education as appropriate  Outcome: Progressing     Problem: PAIN - ADULT  Goal: Verbalizes/displays adequate comfort level or baseline comfort level  Description: Interventions:  - Encourage patient to monitor pain and request assistance  - Assess pain using appropriate pain scale  - Administer analgesics based on type and severity of pain and evaluate response  - Implement non-pharmacological measures as appropriate and evaluate response  - Consider cultural and social influences on pain and pain management  - Notify physician/advanced practitioner if interventions unsuccessful or patient reports new pain  Outcome: Progressing     Problem: INFECTION - ADULT  Goal: Absence or prevention of progression during hospitalization  Description: INTERVENTIONS:  - Assess and monitor for signs and symptoms of infection  - Monitor lab/diagnostic results  - Monitor all insertion sites, i e  indwelling lines, tubes, and drains  - Monitor endotracheal if appropriate and nasal secretions for changes in amount and color  - Kamuela appropriate cooling/warming therapies per order  - Administer medications as ordered  - Instruct and encourage patient and family to use good hand hygiene technique  - Identify and instruct in appropriate isolation precautions for identified infection/condition  Outcome: Progressing     Problem: DISCHARGE PLANNING  Goal: Discharge to home or other facility with appropriate resources  Description: INTERVENTIONS:  - Identify barriers to discharge w/patient and caregiver  - Arrange for needed discharge resources and transportation as appropriate  - Identify discharge learning needs (meds, wound care, etc )  - Arrange for interpretive services to assist at discharge as needed  - Refer to Case Management Department for coordinating discharge planning if the patient needs post-hospital services based on physician/advanced practitioner order or complex needs related to functional status, cognitive ability, or social support system  Outcome: Progressing     Problem: Prexisting or High Potential for Compromised Skin Integrity  Goal: Skin integrity is maintained or improved  Description: INTERVENTIONS:  - Identify patients at risk for skin breakdown  - Assess and monitor skin integrity  - Assess and monitor nutrition and hydration status  - Monitor labs   - Assess for incontinence   - Turn and reposition patient  - Assist with mobility/ambulation  - Relieve pressure over bony prominences  - Avoid friction and shearing  - Provide appropriate hygiene as needed including keeping skin clean and dry  - Evaluate need for skin moisturizer/barrier cream  - Collaborate with interdisciplinary team   - Patient/family teaching  - Consider wound care consult   Outcome: Progressing

## 2023-04-21 LAB
ANION GAP SERPL CALCULATED.3IONS-SCNC: 9 MMOL/L (ref 4–13)
BASOPHILS # BLD AUTO: 0.05 THOUSANDS/ΜL (ref 0–0.1)
BASOPHILS NFR BLD AUTO: 1 % (ref 0–1)
BUN SERPL-MCNC: 16 MG/DL (ref 5–25)
CALCIUM SERPL-MCNC: 8.7 MG/DL (ref 8.4–10.2)
CHLORIDE SERPL-SCNC: 97 MMOL/L (ref 96–108)
CO2 SERPL-SCNC: 29 MMOL/L (ref 21–32)
CREAT SERPL-MCNC: 0.96 MG/DL (ref 0.6–1.3)
EOSINOPHIL # BLD AUTO: 0.26 THOUSAND/ΜL (ref 0–0.61)
EOSINOPHIL NFR BLD AUTO: 3 % (ref 0–6)
ERYTHROCYTE [DISTWIDTH] IN BLOOD BY AUTOMATED COUNT: 13.6 % (ref 11.6–15.1)
GFR SERPL CREATININE-BSD FRML MDRD: 83 ML/MIN/1.73SQ M
GLUCOSE SERPL-MCNC: 101 MG/DL (ref 65–140)
GLUCOSE SERPL-MCNC: 125 MG/DL (ref 65–140)
GLUCOSE SERPL-MCNC: 144 MG/DL (ref 65–140)
GLUCOSE SERPL-MCNC: 156 MG/DL (ref 65–140)
GLUCOSE SERPL-MCNC: 93 MG/DL (ref 65–140)
HCT VFR BLD AUTO: 37.5 % (ref 36.5–49.3)
HGB BLD-MCNC: 12.1 G/DL (ref 12–17)
IMM GRANULOCYTES # BLD AUTO: 0.02 THOUSAND/UL (ref 0–0.2)
IMM GRANULOCYTES NFR BLD AUTO: 0 % (ref 0–2)
LYMPHOCYTES # BLD AUTO: 1.55 THOUSANDS/ΜL (ref 0.6–4.47)
LYMPHOCYTES NFR BLD AUTO: 19 % (ref 14–44)
MCH RBC QN AUTO: 28 PG (ref 26.8–34.3)
MCHC RBC AUTO-ENTMCNC: 32.3 G/DL (ref 31.4–37.4)
MCV RBC AUTO: 87 FL (ref 82–98)
MONOCYTES # BLD AUTO: 0.56 THOUSAND/ΜL (ref 0.17–1.22)
MONOCYTES NFR BLD AUTO: 7 % (ref 4–12)
NEUTROPHILS # BLD AUTO: 5.78 THOUSANDS/ΜL (ref 1.85–7.62)
NEUTS SEG NFR BLD AUTO: 70 % (ref 43–75)
NRBC BLD AUTO-RTO: 0 /100 WBCS
PLATELET # BLD AUTO: 267 THOUSANDS/UL (ref 149–390)
PMV BLD AUTO: 9.4 FL (ref 8.9–12.7)
POTASSIUM SERPL-SCNC: 3.8 MMOL/L (ref 3.5–5.3)
RBC # BLD AUTO: 4.32 MILLION/UL (ref 3.88–5.62)
SODIUM SERPL-SCNC: 135 MMOL/L (ref 135–147)
WBC # BLD AUTO: 8.22 THOUSAND/UL (ref 4.31–10.16)

## 2023-04-21 RX ORDER — INSULIN GLARGINE 100 [IU]/ML
50 INJECTION, SOLUTION SUBCUTANEOUS EVERY 12 HOURS SCHEDULED
Status: DISCONTINUED | OUTPATIENT
Start: 2023-04-21 | End: 2023-04-23

## 2023-04-21 RX ORDER — INSULIN GLARGINE 100 [IU]/ML
45 INJECTION, SOLUTION SUBCUTANEOUS EVERY 12 HOURS SCHEDULED
Status: DISCONTINUED | OUTPATIENT
Start: 2023-04-21 | End: 2023-04-21

## 2023-04-21 RX ADMIN — FINASTERIDE 5 MG: 5 TABLET, FILM COATED ORAL at 08:34

## 2023-04-21 RX ADMIN — METOCLOPRAMIDE 5 MG: 10 TABLET ORAL at 05:30

## 2023-04-21 RX ADMIN — INSULIN GLARGINE 60 UNITS: 100 INJECTION, SOLUTION SUBCUTANEOUS at 08:34

## 2023-04-21 RX ADMIN — INSULIN GLARGINE 50 UNITS: 100 INJECTION, SOLUTION SUBCUTANEOUS at 21:49

## 2023-04-21 RX ADMIN — TAMSULOSIN HYDROCHLORIDE 0.4 MG: 0.4 CAPSULE ORAL at 16:23

## 2023-04-21 RX ADMIN — CEPHALEXIN 500 MG: 500 CAPSULE ORAL at 11:36

## 2023-04-21 RX ADMIN — CLOPIDOGREL BISULFATE 75 MG: 75 TABLET ORAL at 08:34

## 2023-04-21 RX ADMIN — FUROSEMIDE 40 MG: 40 TABLET ORAL at 17:15

## 2023-04-21 RX ADMIN — CEPHALEXIN 500 MG: 500 CAPSULE ORAL at 23:52

## 2023-04-21 RX ADMIN — METOCLOPRAMIDE 5 MG: 10 TABLET ORAL at 16:23

## 2023-04-21 RX ADMIN — PANTOPRAZOLE SODIUM 40 MG: 40 TABLET, DELAYED RELEASE ORAL at 05:30

## 2023-04-21 RX ADMIN — CEPHALEXIN 500 MG: 500 CAPSULE ORAL at 05:30

## 2023-04-21 RX ADMIN — METOCLOPRAMIDE 5 MG: 10 TABLET ORAL at 11:36

## 2023-04-21 RX ADMIN — FUROSEMIDE 40 MG: 40 TABLET ORAL at 08:34

## 2023-04-21 RX ADMIN — ASPIRIN 81 MG: 81 TABLET, COATED ORAL at 08:34

## 2023-04-21 RX ADMIN — CEPHALEXIN 500 MG: 500 CAPSULE ORAL at 17:15

## 2023-04-21 NOTE — PROGRESS NOTES
[address]  Progress Note  Name: Deedee Wade  MRN: [id number]  Unit/Bed#: E5 -01 I Date of Admission: 4/17/2023   Date of Service: 4/21/2023 I Hospital Day: 4    Assessment/Plan   CAD (coronary artery disease)  Assessment & Plan  Continue statin, plavix    Diabetic autonomic neuropathy associated with type 2 diabetes mellitus [facility])  Assessment & Plan  Lab Results   Component Value Date    HGBA1C 6 7 (A) 02/02/2023     Insulin regimen 80 units BID  Add sliding scale, algorithm 4  Continue lantus 50 units BID    Chronic diastolic heart failure (HCC)  Assessment & Plan  Wt Readings from Last 3 Encounters:   04/21/23 127 kg (279 lb 1 6 oz)   09/09/21 127 kg (280 lb)   06/09/21 127 kg (280 lb)     Appears euvolemic on admission, chronic lymphedema    Prior 2D echo reviewed, normal EF   Continue lasix 40mg daily    Diabetic gastroparesis (HCC)  Assessment & Plan  Continue PPI once daily  Add reglan 5mg TID with meals  Recommend small frequent meals  A1c at goal    * Cellulitis of right ankle  Assessment & Plan  Presents with RLE foot wound, compression stocking tourniquet around R ankle with exposed tendon, cellulitic changes   Concern for osteomyelitis versus cellulitis   Wound cx growing GNRs pantoea species susceptible to cephalosporin   MRI showing diffuse subcutaneous edema without definitive osteomyelitis   Lead study shows diffuse disease of right lower extremity   Appreciate vascular surgery input, no intervention at this time, patient with high risk of limb loss in the future   Podiatry recommending local wound care, will need close outpatient follow up   Pending rehab placement CM following   Transition IV abx to keflex to complete 10 day course         VTE Pharmacologic Prophylaxis:   Pharmacologic: Enoxaparin (Lovenox)  Mechanical VTE Prophylaxis in Place: Yes    Patient Centered Rounds: I have performed bedside rounds with nursing staff today      Current Length of Stay: 4 day(s)    Current Patient Status: Inpatient   Certification Statement: The patient will continue to require additional inpatient hospital stay due to awaiting rehab    Discharge Plan: pending    Code Status: Level 1 - Full Code      Subjective:   No events overnight, tolerating diet  Denies any complaints at this time  Objective:     Vitals:   Temp (24hrs), Av 6 °F (36 4 °C), Min:97 4 °F (36 3 °C), Max:98 °F (36 7 °C)    Temp:  [97 4 °F (36 3 °C)-98 °F (36 7 °C)] 98 °F (36 7 °C)  HR:  [77-79] 77  Resp:  [17-18] 18  BP: ()/(47-66) 98/59  SpO2:  [95 %] 95 %  Body mass index is 36 82 kg/m²  Input and Output Summary (last 24 hours): Intake/Output Summary (Last 24 hours) at 2023 1248  Last data filed at 2023 1221  Gross per 24 hour   Intake 440 ml   Output 2000 ml   Net -1560 ml       Physical Exam:     Physical Exam  Vitals and nursing note reviewed  Constitutional:       General: He is not in acute distress  Appearance: He is well-developed  He is obese  HENT:      Head: Normocephalic and atraumatic  Eyes:      General: No scleral icterus  Conjunctiva/sclera: Conjunctivae normal    Cardiovascular:      Rate and Rhythm: Normal rate and regular rhythm  Pulmonary:      Effort: Pulmonary effort is normal  No respiratory distress  Abdominal:      Palpations: Abdomen is soft  Tenderness: There is no abdominal tenderness  Musculoskeletal:         General: No swelling  Comments: RLE lymphedema, dry skin, exposed tendon   Skin:     General: Skin is warm and dry  Neurological:      Mental Status: He is alert  Mental status is at baseline     Psychiatric:         Mood and Affect: Mood normal          Additional Data:     Labs:    Results from last 7 days   Lab Units 23  0528   WBC Thousand/uL 8 22   HEMOGLOBIN g/dL 12 1   HEMATOCRIT % 37 5   PLATELETS Thousands/uL 267   NEUTROS PCT % 70   LYMPHS PCT % 19   MONOS PCT % 7   EOS PCT % 3     Results from last 7 days   Lab Units 04/21/23  0528 04/19/23  0450   SODIUM mmol/L 135 134*   POTASSIUM mmol/L 3 8 3 9   CHLORIDE mmol/L 97 97   CO2 mmol/L 29 30   BUN mg/dL 16 15   CREATININE mg/dL 0 96 1 00   ANION GAP mmol/L 9 7   CALCIUM mg/dL 8 7 8 8   ALBUMIN g/dL  --  3 2*   TOTAL BILIRUBIN mg/dL  --  0 44   ALK PHOS U/L  --  35   ALT U/L  --  9   AST U/L  --  12*   GLUCOSE RANDOM mg/dL 101 131         Results from last 7 days   Lab Units 04/21/23  1126 04/21/23  0732 04/20/23  2137 04/20/23  1551 04/20/23  1055 04/20/23  0729 04/19/23  2131 04/19/23  1558 04/19/23  1116 04/19/23  0657 04/18/23  2203 04/18/23  1606   POC GLUCOSE mg/dl 144* 93 107 101 181* 103 141* 167* 202* 136 158* 224*                   * I Have Reviewed All Lab Data Listed Above  * Additional Pertinent Lab Tests Reviewed: All Labs For Current Hospital Admission Reviewed    Imaging:    XR ankle 3+ views RIGHT    Result Date: 4/18/2023  Impression: No acute osseous abnormality  Workstation performed: [id number]     XR foot 3+ views RIGHT    Result Date: 4/18/2023  Impression: Nondisplaced fracture, terminal tuft of the right 1st toe  Findings concur with the referring clinician's preliminary interpretation already in the patient's electronic health record  Workstation performed: [id number]     MRI ankle/heel right wo contrast    Result Date: 4/20/2023  Impression: Severe subcutaneous edema with ulceration seen medially as above  There is mild marrow edema along the medial malleolus, nonspecific with differential considerations as above  No confluent decreased T1 marrow signal to definitively suggest osteomyelitis at this time   Workstation performed: [id number]       Recent Cultures (last 7 days):     Results from last 7 days   Lab Units 04/17/23 2139   GRAM STAIN RESULT  2+ Polys*  2+ Gram negative rods*   WOUND CULTURE  2+ Growth of - Pantoea septica Pantoea species*       Last 24 Hours Medication List:   Current Facility-Administered Medications   Medication Dose Route Frequency Provider Last Rate    acetaminophen  650 mg Oral Q6H PRN Marla Santiago PA-C      aluminum-magnesium hydroxide-simethicone  30 mL Oral Q6H PRN Marla Santiago PA-C      aspirin  81 mg Oral Daily Marla Santiago PA-C      atorvastatin  80 mg Oral After Deborahgraeme Ruvalcaba PA-C      cephalexin  500 mg Oral Q6H [address] Akhil Woods MD      clopidogrel  75 mg Oral Daily Marla Santiago PA-C      enoxaparin  40 mg Subcutaneous Daily Marla Santiago PA-C      finasteride  5 mg Oral Daily Marla Santiago PA-C      furosemide  40 mg Oral BID Marla Santiago PA-C      insulin glargine  50 Units Subcutaneous Q12H [address] Amrik Woods MD      insulin lispro  2-12 Units Subcutaneous 4x Daily (AC & HS) Marla Santiago PA-C      metoclopramide  5 mg Oral TID AC Yuli Palomino MD      ondansetron  4 mg Intravenous Q6H PRN Marla Santiago PA-C      pantoprazole  40 mg Oral Early Morning Marla Santiago PA-C      polyethylene glycol  17 g Oral Daily Marla Santiago PA-C      tamsulosin  0 4 mg Oral Daily With Dinner Marla Santiago PA-C          Today, Patient Was Seen By: Yuli Palomino MD    ** Please Note: Dictation voice to text software may have been used in the creation of this document   **

## 2023-04-21 NOTE — PLAN OF CARE
"  Problem: OCCUPATIONAL THERAPY ADULT  Goal: Performs self-care activities at highest level of function for planned discharge setting  See evaluation for individualized goals  Description: Treatment Interventions: ADL retraining, UE strengthening/ROM, Functional transfer training, Endurance training, Cognitive reorientation, Patient/family training, Equipment evaluation/education, Compensatory technique education, Energy conservation, Activityengagement          See flowsheet documentation for full assessment, interventions and recommendations  Note: Limitation: Decreased ADL status, Decreased UE strength, Decreased UE ROM, Decreased Safe judgement during ADL, Decreased sensation, Decreased endurance, Decreased self-care trans, Decreased high-level ADLs  Prognosis: Fair  Assessment: Pt seen for skilled OT session focused on ADLs, functional transfers, w/c mobility, UE exercises  Pt standing w/ PT upon arrival and pt w/ MOD assist x2 functional mobility w/ RW to w/c  Pt w/ MOD assist x2 controlled descent to w/c  Pt w/ MIN assist x1 functional mobility w/ w/c and increased time and cues for safety  Pt w/ setup grooming at sink and setup UB Bathing/Dressing  Pt w/ MOD assist LB Bathing and pt w/ MAX assist LB Dressing to don/doff R surgical shoe  Pt reports difficulty using toilet aide due to neuropathy in hand at home and discussed possible benefit of a bidet for toilet, pt reports \"I thought of getting one before, however we rent and it is expensive to install\"  Pt completed b/l UE exercises seen above to increase strength and endurance for ADLs, functional transfers and mobility  Pt seated in w/c end of session, educated pt to sit out until lunch and then return to recliner to elevate LE and RN and pt aware  Pt seated upright at end of session w/ all needs met  Pt progressing w/ functional transfers and mobility, and ADLs   Pt continues to be limited due to decreased strength and endurance, impaired balance, " impaired activity tolerance, increased pain and edema R LE, fall risk, multiple lines all causing a decline in ADLs, functional transfers and mobility  Recommend STR when medically stable  Will continue to follow  The patient's raw score on the AM-PAC Daily Activity Inpatient Short Form is 15  A raw score of greater than or equal to 19 suggests the patient may benefit from discharge to post-acute rehabilitation services  Please refer to the recommendation of the Occupational Therapist for safe discharge planning       OT Discharge Recommendation: Post acute rehabilitation services

## 2023-04-21 NOTE — PLAN OF CARE
Problem: PHYSICAL THERAPY ADULT  Goal: Performs mobility at highest level of function for planned discharge setting  See evaluation for individualized goals  Description: Treatment/Interventions: Functional transfer training, LE strengthening/ROM, Elevations, Therapeutic exercise, Endurance training, Patient/family training, Bed mobility, Gait training, Spoke to nursing, OT  Equipment Recommended: Lito Finder, Wheelchair (pt has)       See flowsheet documentation for full assessment, interventions and recommendations  Outcome: Progressing  Note: Prognosis: Fair  Problem List: Decreased strength, Decreased endurance, Impaired balance, Decreased mobility, Impaired judgement, Impaired sensation, Obesity, Decreased skin integrity  Assessment: PT saw pt as per POC  At beginning of PT session, pt seated in bedside chair  Pt performed seated exercises as noted in flowsheet  Assistance was provided to eliminate shearing friction on LE skin during movements  Pt performed transfers w/ Mod A x 2 requiring verbal cuing for proper mechanics and safety  Pt was able to ambulate 5' using Rolling Walker w/ Min A x 2  Gait characteristic of R foot drop, decreased R stance time, and increased lateral sway  Pt reported back pain when standing and fatigue post ambulation  Further observable gait deviations as noted in flowsheet  During ambulation, no gross LOB and no complaints of dizziness, lightheadedness or SOB  BP 96/59 when standing  Once seated in w/c, BP 98/59  Pt denied dizziness but reported fatigue  Pt was able to self-propel 120' total w/ supervision using backward LE strategy and forward UE strategy  The above mentioned impairments limit pt's ability for independent functional mobility hence will benefit from skilled PT during hospital stay to improve function  Pt is progressing but continues to be limited by decreased activity tolerance  The patient's AM-PAC Basic Mobility Inpatient Short Form Raw Score is 12   A Raw score of less than or equal to 16 suggests the patient may benefit from discharge to post-acute rehabilitation services  Please also refer to the recommendation of the Physical Therapist for safe discharge planning  PT will continue to recommend post acute rehabilitation services upon d/c from acute care, pending pt progress, once medically managed to improve functional mobility to baseline  Pt tolerated session well  Pt at end of session, in stable condition, seated in w/c as requested w/ OT staff for additional tx, and all needs within reach  Barriers to Discharge: Inaccessible home environment, Decreased caregiver support  Barriers to Discharge Comments: VITOR; home alone during the day  PT Discharge Recommendation: Post acute rehabilitation services    See flowsheet documentation for full assessment

## 2023-04-21 NOTE — PLAN OF CARE
Problem: MOBILITY - ADULT  Goal: Maintain or return to baseline ADL function  Description: INTERVENTIONS:  -  Assess patient's ability to carry out ADLs; assess patient's baseline for ADL function and identify physical deficits which impact ability to perform ADLs (bathing, care of mouth/teeth, toileting, grooming, dressing, etc )  - Assess/evaluate cause of self-care deficits   - Assess range of motion  - Assess patient's mobility; develop plan if impaired  - Assess patient's need for assistive devices and provide as appropriate  - Encourage maximum independence but intervene and supervise when necessary  - Involve family in performance of ADLs  - Assess for home care needs following discharge   - Consider OT consult to assist with ADL evaluation and planning for discharge  - Provide patient education as appropriate  Outcome: Progressing  Goal: Maintains/Returns to pre admission functional level  Description: INTERVENTIONS:  - Perform BMAT or MOVE assessment daily    - Set and communicate daily mobility goal to care team and patient/family/caregiver  - Collaborate with rehabilitation services on mobility goals if consulted  - Perform Range of Motion 3 times a day  - Reposition patient every 2 hours    - Dangle patient 3 times a day  - Stand patient 3 times a day  - Ambulate patient 3 times a day  - Out of bed to chair 3 times a day   - Out of bed for meals 3 times a day  - Out of bed for toileting  - Record patient progress and toleration of activity level   Outcome: Progressing     Problem: PAIN - ADULT  Goal: Verbalizes/displays adequate comfort level or baseline comfort level  Description: Interventions:  - Encourage patient to monitor pain and request assistance  - Assess pain using appropriate pain scale  - Administer analgesics based on type and severity of pain and evaluate response  - Implement non-pharmacological measures as appropriate and evaluate response  - Consider cultural and social influences on pain and pain management  - Notify physician/advanced practitioner if interventions unsuccessful or patient reports new pain  Outcome: Progressing     Problem: INFECTION - ADULT  Goal: Absence or prevention of progression during hospitalization  Description: INTERVENTIONS:  - Assess and monitor for signs and symptoms of infection  - Monitor lab/diagnostic results  - Monitor all insertion sites, i e  indwelling lines, tubes, and drains  - Monitor endotracheal if appropriate and nasal secretions for changes in amount and color  - [place] appropriate cooling/warming therapies per order  - Administer medications as ordered  - Instruct and encourage patient and family to use good hand hygiene technique  - Identify and instruct in appropriate isolation precautions for identified infection/condition  Outcome: Progressing     Problem: DISCHARGE PLANNING  Goal: Discharge to home or other facility with appropriate resources  Description: INTERVENTIONS:  - Identify barriers to discharge w/patient and caregiver  - Arrange for needed discharge resources and transportation as appropriate  - Identify discharge learning needs (meds, wound care, etc )  - Arrange for interpretive services to assist at discharge as needed  - Refer to Case Management Department for coordinating discharge planning if the patient needs post-hospital services based on physician/advanced practitioner order or complex needs related to functional status, cognitive ability, or social support system  Outcome: Progressing     Problem: Prexisting or High Potential for Compromised Skin Integrity  Goal: Skin integrity is maintained or improved  Description: INTERVENTIONS:  - Identify patients at risk for skin breakdown  - Assess and monitor skin integrity  - Assess and monitor nutrition and hydration status  - Monitor labs   - Assess for incontinence   - Turn and reposition patient  - Assist with mobility/ambulation  - Relieve pressure over bony prominences  - Avoid friction and shearing  - Provide appropriate hygiene as needed including keeping skin clean and dry  - Evaluate need for skin moisturizer/barrier cream  - Collaborate with interdisciplinary team   - Patient/family teaching  - Consider wound care consult   Outcome: Progressing

## 2023-04-21 NOTE — ASSESSMENT & PLAN NOTE
"         Progress Note    Patient: Lee Ann Zavala Date: 5/12/2022   male, 45year old  Admit Date: 5/10/2022   Attending: Mariah Richmond MD      Subjective:  Lee Ann Zavala is a 45year old male who is being seen in follow up for Ileus (CMS/[facility]). Overnight there are 8 BMs documented, patient reports even more BMs. Says his belly feels less full/distended, and is softer but is still having significant amount of pain and feels his medications aren't really helping. His pain is mostly in band-like distribution across the lower portion of his abdomen but says it also localizes to the left lower quadrant which is somewhat new    He feels very frustrated that he isn't better already, and feels like his care team isn't moving fast enough. Requests             Medications: personally reviewed today in this patient's active orders section of epic  Allergies: Allergies as of 05/10/2022 - Reviewed 05/10/2022   Allergen Reaction Noted   â¢ Adhesive   (environmental) PRURITUS 02/09/2022       PHYSICAL EXAM:  Visit Vitals  BP (!) 145/94 (BP Location: LUE - Left upper extremity, Patient Position: Semi-Khalil's)   Pulse 71   Temp 97.8 Â°F (36.6 Â°C) (Oral)   Resp 16   Ht 5' 10"" (1.778 m)   Wt 75.3 kg (166 lb 0.1 oz)   SpO2 98%   BMI 23.82 kg/mÂ²     General:       A & O X 3, Tearful and frustrated, normocephalic/atraumatic. Thin, frail, and chronically ill appearing. Lungs:          Clear to auscultation bilaterally, no accessory muscle use, breathing comfortably on room air  Heart:            Regular rate and rhythm, S1, S2 present. No M/R/G  Abdomen:    Abdomen non-distended, but somewhat firm and diffusely tender to palpation  + B.S.; No rebound;  No peritoneal signs  Extremities: cyanosis absent; no obvious lesions or wounds, no peripheral edema  Neurologic:  CN 2-12 Grossly intact as best as patient can cooperate with exam                         strength is bilaterally intact in all 4 extremities , sensation is grossly " Presents with RLE foot wound, compression stocking tourniquet around R ankle with exposed tendon, cellulitic changes   Concern for osteomyelitis versus cellulitis   Wound cx growing GNRs pantoea species susceptible to cephalosporin   MRI showing diffuse subcutaneous edema without definitive osteomyelitis   Lead study shows diffuse disease of right lower extremity   Appreciate vascular surgery input, no intervention at this time, patient with high risk of limb loss in the future   Podiatry recommending local wound care, will need close outpatient follow up   Pending rehab placement CM following   Transition IV abx to keflex to complete 10 day course intact throughout      Labs:  Recent Labs   Lab 05/12/22  0438 05/11/22  0450 05/10/22  0756   WBC 2.0* 2.1* 2.4*   RBC 3.27* 3.27* 3.43*   HGB 10.1* 10.2* 10.5*   HCT 30.0* 30.1* 31.4*    169 172     Recent Labs   Lab 05/12/22  0438 05/11/22  0450 05/10/22  2129 05/10/22  0756 05/10/22  0756 05/09/22  1431   SODIUM 141 139  --   --  138  --    POTASSIUM 3.6 4.1 4.0   < > 3.4  --    CHLORIDE 105 104  --   --  102  --    CO2 31 30  --   --  31  --    BUN 3* 4*  --   --  4*  --    CREATININE 0.44* 0.43*  --   --  0.39*  --    GLUCOSE 98 98  --   --  101*  --    CALCIUM 7.5* 7.5*  --   --  8.0*  --    ALBUMIN  --   --   --   --  2.6* 2.7*   AST  --   --   --   --  20 16   GPT  --   --   --   --  17 17   BILIRUBIN  --   --   --   --  0.3 0.3   ALKPT  --   --   --   --  96 108    < > = values in this interval not displayed.        Assessment & Plan:     Â· Acute On Chronic Pain  Â· Stage IV Lung cancer-  Â· Pleurex catheter in place, will not drain any further fluid while in the hospital  Â· Has spine stimulator and intrathecal pain pump with dilaudid and bupivacaine  Â· PRN Dilaudid 4mg q4h PRN  Â· Medications for pain:   Â· Scheduled: Tylenol, Baclofen, and Lyrica  Â· PRN: Cyclobenzoprine, Dilaudid 4mg q4 prn, Toradol 15mg Q6prn  Â· Heating pad applied for abdominal pain  Â· Consulting Pain medicine and palliative care to assist with optimization of pain control  Â· Appreciate assistance and recommendations  Â· Tentative plan by Pain med is to discuss Pain pump medication adjustment with regular pain med provider Dr. Dhruv Veloz Recommends discontinuing any oral IV and PO pain medications to rely solely on pain pump basal rate adjustment and boluses    Â· Severe Protein Calorie Malnutrition  Â· Signs/Symptoms:  Â· current weight of 75kg  Â· per EPIC review weight was 80kg 5/6/22 and 80kg 4/25/22  Â· 6% weight loss  Â· albumin 2.6 5/10  Â· pt refusing to eat currently with 0% intake  Â· pt frequently in as outpt for IVFs and IV "anti-emetics (Monday, Wed, Friday)  Â· fatigue and nausea noted, ""oral intake remains marginal\""    Â·  Treatment/Evaluation:  Â· IVFs  Â· monitoring of weight and I&O  Â· Encourage PO intake      Â· Ileus - resolved  Â· Pt with appropriate response to GoLytely bowel prep  Â· KUB from today shows air throughout loops of bowel but not a significant amount of stool  Â· Potassium 3.6 today, replacement as per electrolyte replacement protocol  Â· Holding home bentyl       Chronic Problems:  GERD- continue home ppi and famotidine  BPH- Continue home flowmax  Anxiety - home Hydroxyzine    Â· DVT Prophylaxis  Current Active Medications for DVT Prophylaxis (From admission, onward)         Stop     enoxaparin (LOVENOX) injection 40 mg  40 mg,   Subcutaneous,   DAILY         --                Central Line- none  Christy Catheter- none    Code status: Full Resuscitation    Disposition: plan for discharge in the morning    Marylin Diaz MD  Hospitalist  5/12/2022  1:03 PM            "

## 2023-04-21 NOTE — ASSESSMENT & PLAN NOTE
Lab Results   Component Value Date    HGBA1C 6 7 (A) 02/02/2023     Insulin regimen 80 units BID  Add sliding scale, algorithm 4  Continue lantus 50 units BID

## 2023-04-21 NOTE — ASSESSMENT & PLAN NOTE
Wt Readings from Last 3 Encounters:   04/21/23 127 kg (279 lb 1 6 oz)   09/09/21 127 kg (280 lb)   06/09/21 127 kg (280 lb)     Appears euvolemic on admission, chronic lymphedema    Prior 2D echo reviewed, normal EF   Continue lasix 40mg daily

## 2023-04-21 NOTE — PLAN OF CARE
Problem: MOBILITY - ADULT  Goal: Maintain or return to baseline ADL function  Description: INTERVENTIONS:  -  Assess patient's ability to carry out ADLs; assess patient's baseline for ADL function and identify physical deficits which impact ability to perform ADLs (bathing, care of mouth/teeth, toileting, grooming, dressing, etc )  - Assess/evaluate cause of self-care deficits   - Assess range of motion  - Assess patient's mobility; develop plan if impaired  - Assess patient's need for assistive devices and provide as appropriate  - Encourage maximum independence but intervene and supervise when necessary  - Involve family in performance of ADLs  - Assess for home care needs following discharge   - Consider OT consult to assist with ADL evaluation and planning for discharge  - Provide patient education as appropriate  Outcome: Progressing  Goal: Maintains/Returns to pre admission functional level  Description: INTERVENTIONS:  - Perform BMAT or MOVE assessment daily    - Set and communicate daily mobility goal to care team and patient/family/caregiver  - Collaborate with rehabilitation services on mobility goals if consulted  - Perform Range of Motion  times a day  - Reposition patient every  hours    - Dangle patient  times a day  - Stand patient  times a day  - Ambulate patient  times a day  - Out of bed to chair  times a day   - Out of bed for meals times a day  - Out of bed for toileting  - Record patient progress and toleration of activity level   Outcome: Progressing     Problem: PAIN - ADULT  Goal: Verbalizes/displays adequate comfort level or baseline comfort level  Description: Interventions:  - Encourage patient to monitor pain and request assistance  - Assess pain using appropriate pain scale  - Administer analgesics based on type and severity of pain and evaluate response  - Implement non-pharmacological measures as appropriate and evaluate response  - Consider cultural and social influences on pain and pain management  - Notify physician/advanced practitioner if interventions unsuccessful or patient reports new pain  Outcome: Progressing     Problem: INFECTION - ADULT  Goal: Absence or prevention of progression during hospitalization  Description: INTERVENTIONS:  - Assess and monitor for signs and symptoms of infection  - Monitor lab/diagnostic results  - Monitor all insertion sites, i e  indwelling lines, tubes, and drains  - Monitor endotracheal if appropriate and nasal secretions for changes in amount and color  - [place] appropriate cooling/warming therapies per order  - Administer medications as ordered  - Instruct and encourage patient and family to use good hand hygiene technique  - Identify and instruct in appropriate isolation precautions for identified infection/condition  Outcome: Progressing     Problem: DISCHARGE PLANNING  Goal: Discharge to home or other facility with appropriate resources  Description: INTERVENTIONS:  - Identify barriers to discharge w/patient and caregiver  - Arrange for needed discharge resources and transportation as appropriate  - Identify discharge learning needs (meds, wound care, etc )  - Arrange for interpretive services to assist at discharge as needed  - Refer to Case Management Department for coordinating discharge planning if the patient needs post-hospital services based on physician/advanced practitioner order or complex needs related to functional status, cognitive ability, or social support system  Outcome: Progressing     Problem: Prexisting or High Potential for Compromised Skin Integrity  Goal: Skin integrity is maintained or improved  Description: INTERVENTIONS:  - Identify patients at risk for skin breakdown  - Assess and monitor skin integrity  - Assess and monitor nutrition and hydration status  - Monitor labs   - Assess for incontinence   - Turn and reposition patient  - Assist with mobility/ambulation  - Relieve pressure over bony prominences  - Avoid friction and shearing  - Provide appropriate hygiene as needed including keeping skin clean and dry  - Evaluate need for skin moisturizer/barrier cream  - Collaborate with interdisciplinary team   - Patient/family teaching  - Consider wound care consult   Outcome: Progressing

## 2023-04-21 NOTE — OCCUPATIONAL THERAPY NOTE
Occupational Therapy Progress Note     Patient Name: Rubén Coulter  [id number] Date: 4/21/2023  Problem List  Principal Problem:    Cellulitis of right ankle  Active Problems:    Diabetic gastroparesis (HCC)    Chronic diastolic heart failure (HCC)    Diabetic autonomic neuropathy associated with type 2 diabetes mellitus (HCC)    CAD (coronary artery disease)    Chronic venous insufficiency            04/21/23 1135   OT Last Visit   OT Visit Date 04/21/23   Note Type   Note Type Treatment   Pain Assessment   Pain Assessment Tool 0-10   Pain Score 3   Pain Location/Orientation Orientation: Right;Location: Leg   Hospital Pain Intervention(s) Ambulation/increased activity;Repositioned; Emotional support   Restrictions/Precautions   Weight Bearing Precautions Per Order Yes   RLE Weight Bearing Per Order WBAT   Braces or Orthoses   (L LE prosthetic, R surgical shoe)   Other Precautions Fall Risk;Pain;Multiple lines   Lifestyle   Intrinsic Gratification enjoys baseball   ADL   Where Assessed Wheelchair   Grooming Assistance 5  Supervision/Setup   Grooming Deficit Setup;Verbal cueing;Supervision/safety; Increased time to complete;Wash/dry face;Brushing hair   Grooming Comments increased time   UB Bathing Assistance 5  Supervision/Setup   UB Bathing Deficit Setup;Verbal cueing;Supervision/safety; Increased time to complete   LB Bathing Assistance 3  Moderate Assistance   LB Bathing Deficit Steadying;Setup;Verbal cueing; Increased time to complete;Supervision/safety   UB Dressing Assistance 5  Supervision/Setup   UB Dressing Deficit Setup;Supervision/safety; Increased time to complete;Verbal cueing   LB Dressing Assistance 1  Total Assistance   LB Dressing Deficit Setup;Verbal cueing;Supervision/safety; Increased time to complete; Don/doff R shoe   LB Dressing Comments assist to don R surgical shoe   Transfers   Stand to Sit 3  Moderate assistance   Additional items Assist x 2; Increased time required;Verbal cues;Armrests   Additional "Comments pt standing w/ PT upon arrival  Pt w/ MOD assist  x2 w/ controlled descent for positioning; BP:   Functional Mobility   Functional Mobility 4  Minimal assistance   Additional Comments assist x1 w/ w/c mobility and increased time to complete due to bariatric w/c and reports increased difficulty to maneuver   Therapeutic Exercise - ROM   UE-ROM Yes   ROM- Right Upper Extremities   R Shoulder AROM; Flexion; Extension  (triceps)   R Elbow AROM;Elbow extension;Elbow flexion  (punchouts, forearm pronation/supination)   R Weight/Reps/Sets 2 sets x 10reps   RUE ROM Comment tolerated well w/ cues for correct techniques   ROM - Left Upper Extremities    L Shoulder AROM; Extension;Flexion  (triceps)   L Elbow AROM;Elbow flexion;Elbow extension  (punchouts, forearm pronation/supination)   L Weight/Reps/Sets 2 sets x 10 reps   LUE ROM Comment tolerated well w/ cues for correct techniques   Subjective   Subjective \"I'm doing better\"   Cognition   Overall Cognitive Status WFL   Arousal/Participation Cooperative   Attention Within functional limits   Orientation Level Oriented X4   Memory Within functional limits   Following Commands Follows one step commands without difficulty   Comments engages in appropriate conversations, pleasant and cooperative   Additional Activities   Additional Activities   (education on positioning)   Additional Activities Comments pt receptive   Activity Tolerance   Activity Tolerance Patient limited by pain; Patient limited by fatigue;Treatment limited secondary to medical complications (Comment)   Medical Staff Made Aware appropriate to see per Nicole RICHMOND   Assessment   Assessment Pt seen for skilled OT session focused on ADLs, functional transfers, w/c mobility, UE exercises  Pt standing w/ PT upon arrival and pt w/ MOD assist x2 functional mobility w/ RW to w/c  Pt w/ MOD assist x2 controlled descent to w/c  Pt w/ MIN assist x1 functional mobility w/ w/c and increased time and cues for safety   Pt " "w/ setup grooming at sink and setup UB Bathing/Dressing  Pt w/ MOD assist LB Bathing and pt w/ MAX assist LB Dressing to don/doff R surgical shoe  Pt reports difficulty using toilet aide due to neuropathy in hand at home and discussed possible benefit of a bidet for toilet, pt reports \"I thought of getting one before, however we rent and it is expensive to install\"  Pt completed b/l UE exercises seen above to increase strength and endurance for ADLs, functional transfers and mobility  Pt seated in w/c end of session, educated pt to sit out until lunch and then return to recliner to elevate LE and RN and pt aware  Pt seated upright at end of session w/ all needs met  Pt progressing w/ functional transfers and mobility, and ADLs  Pt continues to be limited due to decreased strength and endurance, impaired balance, impaired activity tolerance, increased pain and edema R LE, fall risk, multiple lines all causing a decline in ADLs, functional transfers and mobility  Recommend STR when medically stable  Will continue to follow  The patient's raw score on the -PAC Daily Activity Inpatient Short Form is 15  A raw score of greater than or equal to 19 suggests the patient may benefit from discharge to post-acute rehabilitation services  Please refer to the recommendation of the Occupational Therapist for safe discharge planning  Plan   Treatment Interventions ADL retraining;UE strengthening/ROM; Functional transfer training; Endurance training;Cognitive reorientation;Patient/family training;Equipment evaluation/education; Compensatory technique education; Energy conservation; Activityengagement   Goal Expiration Date 05/03/23   OT Treatment Day 1   OT Frequency 3-5x/wk   Recommendation   OT Discharge Recommendation Post acute rehabilitation services   Additional Comments  Pt seen for co-session with skilled Physical therapist 2* clinically unstable presentation, medical complexity, new precautions, performance " deficits/functional limitations, limited activity tolerance and present impairments which are a regression from patient patient's baseline and impacting overall occupational performance   AM-PAC Daily Activity Inpatient   Lower Body Dressing 2   Bathing 2   Toileting 1   Upper Body Dressing 3   Grooming 3   Eating 4   Daily Activity Raw Score 15   Daily Activity Standardized Score (Calc for Raw Score >=11) 34 69   AM-PAC Applied Cognition Inpatient   Following a Speech/Presentation 4   Understanding Ordinary Conversation 4   Taking Medications 3   Remembering Where Things Are Placed or Put Away 3   Remembering List of 4-5 Errands 3   Taking Care of Complicated Tasks 3   Applied Cognition Raw Score 20   Applied Cognition Standardized Score 41 76   Modified [place] Scale   Modified [place] Scale 4   End of Consult   Education Provided Yes   Patient Position at End of Consult Bed/Chair alarm activated; All needs within reach; Bedside chair   Nurse Communication Nurse aware of consult     Documentation completed by: Joanne Mg MS, OTR/L

## 2023-04-21 NOTE — PHYSICAL THERAPY NOTE
PT PROGRESS NOTE    Name: Ori Buckley  AGE: 60 y o  MRN: [id number]  LENGTH OF STAY: 4    Admitting Dx: Toe pain [M79 676]  Leg wound, right, initial encounter [S81 801A]  Right leg swelling [M79 89]      Patient Active Problem List   Diagnosis    Anxiety and depression    Diabetic neuropathy, type II diabetes mellitus [facility]    Hyperlipidemia    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    Vitamin D deficiency    PAD (peripheral artery disease) ([facility])    S/P BKA (below knee amputation), left ([facility])    Orthostatic hypotension    Diabetic gastroparesis ([facility])    Class 2 severe obesity due to excess calories with serious comorbidity and body mass index (BMI) of 35 0 to 35 9 in adult [facility])    Triple vessel coronary artery disease    Hypertensive heart disease with congestive heart failure ([facility])    Chronic diastolic heart failure ([facility])    Other constipation    S/P CABG x 3    Diabetic autonomic neuropathy associated with type 2 diabetes mellitus ([facility])    Hyponatremia    Obstructive sleep apnea    Amputated below knee, left ([facility])    Phantom limb syndrome without pain ([facility])    CAD (coronary artery disease)    Chronic venous insufficiency    Nodular rash    Elephantiasis nostras verrucosa    Other acute osteomyelitis, left ankle and foot ([facility])    Embolism and thrombosis of arteries of the lower extremities ([facility])    Lymphedema of right lower extremity    Venous stasis dermatitis of right lower extremity    Abdominal distension    Non-pressure chronic ulcer of right calf with fat layer exposed ([facility] )    Cellulitis of right ankle                04/21/23 1039   PT Last Visit   PT Visit Date 04/21/23   Note Type   Note Type Treatment   Pain Assessment   Pain Assessment Tool 0-10   Pain Score No Pain   Hospital Pain Intervention(s) Repositioned; Emotional support; Ambulation/increased activity; Rest   Restrictions/Precautions   Weight Bearing Precautions Per Order Yes   RLE Weight Bearing "Per Order WBAT   Braces or Orthoses Other (Comment)  (L LE BK prosthetic; R LE surgical shoe)   Other Precautions Fall Risk;Pain   General   Chart Reviewed Yes   Response to Previous Treatment Patient with no complaints from previous session  Family/Caregiver Present No   Cognition   Overall Cognitive Status WFL   Arousal/Participation Cooperative; Alert   Attention Within functional limits   Orientation Level Oriented X4   Following Commands Follows one step commands without difficulty   Comments pt pleasant and cooperative   Subjective   Subjective \"I want to get to the wheelchair\"   Bed Mobility   Supine to Sit Unable to assess   Additional Comments pt sitting in bedside chair pre session and sitting in w/c w/ OT staff post session  Transfers   Sit to Stand 3  Moderate assistance   Additional items Assist x 2;Armrests; Increased time required;Verbal cues  (to RW)   Stand to Sit 3  Moderate assistance   Additional items Assist x 2;Armrests; Increased time required;Verbal cues  (from RW)   Additional Comments pt has difficulty w/ forward weight shift  v/c for hand placement and safety techniques  BP 96/59 standing  Once seated in w/c, BP 98/59  Pt denied dizziness but reported fatigue  Ambulation/Elevation   Gait pattern Wide ROBBY;R Foot drag; Forward Flexion;Decreased foot clearance;Decreased R stance; Short stride; Step to;Excessively slow;Decreased hip extension;Decreased heel strike;Decreased toe off   Gait Assistance 4  Minimal assist   Additional items Assist x 2;Verbal cues; Tactile cues   Assistive Device Rolling walker   Distance 5'   Ambulation/Elevation Additional Comments Pt reports known R foot drop observable during ambulation  Pt w/ increased lateral weight shift and R hip flexion in order to advance R LE  During ambulation, no gross LOB and no complaints of dizziness, lightheadedness or SOB  Pt reports back pain with increased duration of standing   Pt unable to achieve full upright posture 2* to back " pain    Wheelchair Activities   Wheelchair Cushion None   Level of Assistance for Pressure Relief Activities Independent   Wheelchair Parts Management Yes   Left Brakes Level of Assistance Independent   Right Brakes Level of Assistance Independent   Propulsion Yes   Propulsion Type 1 Manual   Level 1 Level tile   Method 1 Right lower extremity; Left lower extremity   Level of Assistance 1 Close supervision  (for navigation)   Description/ Details 1 pt able to self propel backwards w/ B/L LE  Pt required direction for obstacle avoidance/navigation  Combined self-propelled distance 120'  Propulsion 2   Propulsion Type 2 Manual   Level 2 Level tile   Method 2 Right upper extremity; Left upper extremity   Level of Assistance 2 Independent;Close supervision   Description/Details 2 independent forward self-propulsion w/ B/L UE and no v/c required for navigation  Combined self-propelled distance 120'  Balance   Static Sitting Good   Dynamic Sitting Fair +   Static Standing Poor +   Dynamic Standing Poor   Ambulatory Poor   Endurance Deficit   Endurance Deficit Yes   Endurance Deficit Description pt reported fatigue w/ standing and w/c propulsion   Activity Tolerance   Activity Tolerance Patient limited by fatigue;Patient tolerated treatment well   Medical Staff Made Aware BOB [place]   [facility]   Exercises   Quad Sets Sitting;10 reps;AROM; Bilateral   Heelslides Sitting;10 reps;AROM; Bilateral   Glute Sets Sitting;10 reps;AROM; Bilateral   Hip Abduction Sitting;10 reps;AROM; Bilateral   Hip Adduction Sitting;10 reps;AROM; Bilateral   Knee AROM Long Arc Quad Sitting;10 reps;AROM; Bilateral   Ankle Pumps Sitting;10 reps;AROM; Bilateral   Assessment   Prognosis Fair   Problem List Decreased strength;Decreased endurance; Impaired balance;Decreased mobility; Impaired judgement; Impaired sensation;Obesity; Decreased skin integrity   Assessment PT saw pt as per POC  At beginning of PT session, pt seated in bedside chair   Pt performed seated exercises as noted in flowsheet  Assistance was provided to eliminate shearing friction on LE skin during movements  Pt performed transfers w/ Mod A x 2 requiring verbal cuing for proper mechanics and safety  Pt was able to ambulate 5' using Rolling Walker w/ Min A x 2  Gait characteristic of R foot drop, decreased R stance time, and increased lateral sway  Pt reported back pain when standing and fatigue post ambulation  Further observable gait deviations as noted in flowsheet  During ambulation, no gross LOB and no complaints of dizziness, lightheadedness or SOB  BP 96/59 when standing  Once seated in w/c, BP 98/59  Pt denied dizziness but reported fatigue  Pt was able to self-propel 120' total w/ supervision using backward LE strategy and forward UE strategy  The above mentioned impairments limit pt's ability for independent functional mobility hence will benefit from skilled PT during hospital stay to improve function  Pt is progressing but continues to be limited by decreased activity tolerance  The patient's AM-PAC Basic Mobility Inpatient Short Form Raw Score is 12  A Raw score of less than or equal to 16 suggests the patient may benefit from discharge to post-acute rehabilitation services  Please also refer to the recommendation of the Physical Therapist for safe discharge planning  PT will continue to recommend post acute rehabilitation services upon d/c from acute care, pending pt progress, once medically managed to improve functional mobility to baseline  Pt tolerated session well  Pt at end of session, in stable condition, seated in w/c as requested w/ OT staff for additional tx, and all needs within reach  Goals   Patient Goals to get better   PT Treatment Day 1   Plan   Treatment/Interventions Functional transfer training;LE strengthening/ROM; Elevations; Therapeutic exercise; Endurance training;Patient/family training;Bed mobility;Gait training;Spoke to nursing;OT   Progress Slow progress, decreased activity tolerance   PT Frequency 3-5x/wk   Recommendation   PT Discharge Recommendation Post acute rehabilitation services   AM-PAC Basic Mobility Inpatient   Turning in Flat Bed Without Bedrails 3   Lying on Back to Sitting on Edge of Flat Bed Without Bedrails 2   Moving Bed to Chair 2   Standing Up From Chair Using Arms 2   Walk in Room 2   Climb 3-5 Stairs With Railing 1   Basic Mobility Inpatient Raw Score 12   Basic Mobility Standardized Score 32 23   Highest Level Of Mobility   JH-HLM Goal 4: Move to chair/commode   JH-HLM Achieved 5: Stand (1 or more minutes)   Education   Education Provided Mobility training;Home exercise program;Assistive device   Patient Demonstrates acceptance/verbal understanding;Reinforcement needed   End of Consult   Patient Position at End of Consult Other (comment); All needs within reach  (seated in w/c w/ OT staff)   End of Consult Comments Pt in stable condition, in w/c as requested, w/ OT staff for further tx session         Sarah Salazar PTS

## 2023-04-22 LAB
GLUCOSE SERPL-MCNC: 125 MG/DL (ref 65–140)
GLUCOSE SERPL-MCNC: 132 MG/DL (ref 65–140)
GLUCOSE SERPL-MCNC: 171 MG/DL (ref 65–140)
GLUCOSE SERPL-MCNC: 199 MG/DL (ref 65–140)

## 2023-04-22 RX ORDER — METOCLOPRAMIDE HYDROCHLORIDE 5 MG/ML
5 INJECTION INTRAMUSCULAR; INTRAVENOUS EVERY 6 HOURS PRN
Status: DISCONTINUED | OUTPATIENT
Start: 2023-04-22 | End: 2023-04-22

## 2023-04-22 RX ORDER — METOCLOPRAMIDE 10 MG/1
10 TABLET ORAL 3 TIMES DAILY PRN
Status: DISCONTINUED | OUTPATIENT
Start: 2023-04-22 | End: 2023-05-02 | Stop reason: HOSPADM

## 2023-04-22 RX ADMIN — PANTOPRAZOLE SODIUM 40 MG: 40 TABLET, DELAYED RELEASE ORAL at 06:06

## 2023-04-22 RX ADMIN — INSULIN LISPRO 2 UNITS: 100 INJECTION, SOLUTION INTRAVENOUS; SUBCUTANEOUS at 11:40

## 2023-04-22 RX ADMIN — TAMSULOSIN HYDROCHLORIDE 0.4 MG: 0.4 CAPSULE ORAL at 16:15

## 2023-04-22 RX ADMIN — CEPHALEXIN 500 MG: 500 CAPSULE ORAL at 17:02

## 2023-04-22 RX ADMIN — INSULIN GLARGINE 50 UNITS: 100 INJECTION, SOLUTION SUBCUTANEOUS at 08:39

## 2023-04-22 RX ADMIN — INSULIN GLARGINE 50 UNITS: 100 INJECTION, SOLUTION SUBCUTANEOUS at 21:48

## 2023-04-22 RX ADMIN — CEPHALEXIN 500 MG: 500 CAPSULE ORAL at 23:43

## 2023-04-22 RX ADMIN — CEPHALEXIN 500 MG: 500 CAPSULE ORAL at 06:06

## 2023-04-22 RX ADMIN — CLOPIDOGREL BISULFATE 75 MG: 75 TABLET ORAL at 08:40

## 2023-04-22 RX ADMIN — FUROSEMIDE 40 MG: 40 TABLET ORAL at 08:40

## 2023-04-22 RX ADMIN — FUROSEMIDE 40 MG: 40 TABLET ORAL at 17:02

## 2023-04-22 RX ADMIN — ASPIRIN 81 MG: 81 TABLET, COATED ORAL at 08:40

## 2023-04-22 RX ADMIN — CEPHALEXIN 500 MG: 500 CAPSULE ORAL at 11:40

## 2023-04-22 RX ADMIN — METOCLOPRAMIDE 5 MG: 10 TABLET ORAL at 06:06

## 2023-04-22 RX ADMIN — INSULIN LISPRO 2 UNITS: 100 INJECTION, SOLUTION INTRAVENOUS; SUBCUTANEOUS at 21:48

## 2023-04-22 RX ADMIN — FINASTERIDE 5 MG: 5 TABLET, FILM COATED ORAL at 08:40

## 2023-04-22 NOTE — PROGRESS NOTES
[address] [place] [address]  Progress Note  Name: Cathy Celis  MRN: [id number]  Unit/Bed#: E5 -01 I Date of Admission: 4/17/2023   Date of Service: 4/22/2023 I Hospital Day: 5    Assessment/Plan   CAD (coronary artery disease)  Assessment & Plan  Continue statin, plavix    Diabetic autonomic neuropathy associated with type 2 diabetes mellitus [facility])  Assessment & Plan  Lab Results   Component Value Date    HGBA1C 6 7 (A) 02/02/2023     Home: Insulin regimen 80 units BID  Add sliding scale, algorithm 4  Continue lantus 50 units BID while in hospital    Chronic diastolic heart failure (HCC)  Assessment & Plan  Wt Readings from Last 3 Encounters:   04/22/23 126 kg (277 lb 1 9 oz)   09/09/21 127 kg (280 lb)   06/09/21 127 kg (280 lb)     Appears euvolemic on admission, chronic lymphedema    Prior 2D echo reviewed, normal EF   Continue lasix 40mg daily    Diabetic gastroparesis (HCC)  Assessment & Plan  Continue PPI once daily  Add reglan 5mg TID PRN with meals  Recommend small frequent meals    * Cellulitis of right ankle  Assessment & Plan  Presents with RLE foot wound, compression stocking tourniquet around R ankle with exposed tendon, cellulitic changes   Concern for osteomyelitis versus cellulitis   Wound cx growing GNRs pantoea species susceptible to cephalosporin   MRI showing diffuse subcutaneous edema without definitive osteomyelitis   Lead study shows diffuse disease of right lower extremity   Appreciate vascular surgery input, no intervention at this time, patient with high risk of limb loss in the future   Podiatry recommending local wound care, will need close outpatient follow up   Pending rehab placement CM following   Transition IV abx to keflex to complete 10 day course           VTE Pharmacologic Prophylaxis:   Pharmacologic: Enoxaparin (Lovenox)  Mechanical VTE Prophylaxis in Place: Yes    Patient Centered Rounds: I have performed bedside rounds with nursing staff today     Current Length of Stay: 5 day(s)    Current Patient Status: Inpatient   Certification Statement: The patient will continue to require additional inpatient hospital stay due to pending placement for STR    Discharge Plan: pending    Code Status: Level 1 - Full Code      Subjective:   No events overnight  No complaints  Denies fevers, chills, nausea or vomiting  Objective:     Vitals:   Temp (24hrs), Av 7 °F (36 5 °C), Min:97 6 °F (36 4 °C), Max:97 8 °F (36 6 °C)    Temp:  [97 6 °F (36 4 °C)-97 8 °F (36 6 °C)] 97 8 °F (36 6 °C)  Resp:  [17-18] 17  BP: ()/(56-73) 123/56  Body mass index is 36 56 kg/m²  Input and Output Summary (last 24 hours): Intake/Output Summary (Last 24 hours) at 2023 1035  Last data filed at 2023 2357  Gross per 24 hour   Intake 240 ml   Output 2050 ml   Net -1810 ml       Physical Exam:     Physical Exam  Vitals and nursing note reviewed  Constitutional:       General: He is not in acute distress  Appearance: He is well-developed  He is obese  HENT:      Head: Normocephalic and atraumatic  Eyes:      General: No scleral icterus  Conjunctiva/sclera: Conjunctivae normal    Cardiovascular:      Rate and Rhythm: Normal rate and regular rhythm  Pulmonary:      Effort: Pulmonary effort is normal  No respiratory distress  Abdominal:      Palpations: Abdomen is soft  Tenderness: There is no abdominal tenderness  Musculoskeletal:         General: No swelling  Comments: RLE lymphedema, dry skin, exposed tendon   Skin:     General: Skin is warm and dry  Neurological:      Mental Status: He is alert  Mental status is at baseline     Psychiatric:         Mood and Affect: Mood normal        Additional Data:     Labs:    Results from last 7 days   Lab Units 23  0528   WBC Thousand/uL 8 22   HEMOGLOBIN g/dL 12 1   HEMATOCRIT % 37 5   PLATELETS Thousands/uL 267   NEUTROS PCT % 70   LYMPHS PCT % 19   MONOS PCT % 7   EOS PCT % 3 Results from last 7 days   Lab Units 04/21/23  0528 04/19/23  0450   SODIUM mmol/L 135 134*   POTASSIUM mmol/L 3 8 3 9   CHLORIDE mmol/L 97 97   CO2 mmol/L 29 30   BUN mg/dL 16 15   CREATININE mg/dL 0 96 1 00   ANION GAP mmol/L 9 7   CALCIUM mg/dL 8 7 8 8   ALBUMIN g/dL  --  3 2*   TOTAL BILIRUBIN mg/dL  --  0 44   ALK PHOS U/L  --  35   ALT U/L  --  9   AST U/L  --  12*   GLUCOSE RANDOM mg/dL 101 131         Results from last 7 days   Lab Units 04/22/23  0803 04/21/23  2058 04/21/23  1549 04/21/23  1126 04/21/23  0732 04/20/23  2137 04/20/23  1551 04/20/23  1055 04/20/23  0729 04/19/23  2131 04/19/23  1558 04/19/23  1116   POC GLUCOSE mg/dl 132 156* 125 144* 93 107 101 181* 103 141* 167* 202*                   * I Have Reviewed All Lab Data Listed Above  * Additional Pertinent Lab Tests Reviewed: All Labs For Current Hospital Admission Reviewed    Imaging:    XR ankle 3+ views RIGHT    Result Date: 4/18/2023  Impression: No acute osseous abnormality  Workstation performed: [id number]     XR foot 3+ views RIGHT    Result Date: 4/18/2023  Impression: Nondisplaced fracture, terminal tuft of the right 1st toe  Findings concur with the referring clinician's preliminary interpretation already in the patient's electronic health record  Workstation performed: [id number]     MRI ankle/heel right wo contrast    Result Date: 4/20/2023  Impression: Severe subcutaneous edema with ulceration seen medially as above  There is mild marrow edema along the medial malleolus, nonspecific with differential considerations as above  No confluent decreased T1 marrow signal to definitively suggest osteomyelitis at this time   Workstation performed: [id number]       Recent Cultures (last 7 days):     Results from last 7 days   Lab Units 04/17/23 2139   GRAM STAIN RESULT  2+ Polys*  2+ Gram negative rods*   WOUND CULTURE  2+ Growth of - Pantoea septica Pantoea species*       Last 24 Hours Medication List:   Current Facility-Administered Medications   Medication Dose Route Frequency Provider Last Rate    acetaminophen  650 mg Oral Q6H PRN ANNY Garza-JESSICA      aluminum-magnesium hydroxide-simethicone  30 mL Oral Q6H PRN ANNY Garza-JESSICA      aspirin  81 mg Oral Daily ANNY Garza-JESSICA      atorvastatin  80 mg Oral After ANNY Topete-JESSICA      cephalexin  500 mg Oral Q6H [facility] Oneal Gil MD      clopidogrel  75 mg Oral Daily Urmila Pino PA-C      enoxaparin  40 mg Subcutaneous Daily Urmila MustANNY guaman-JESSICA      finasteride  5 mg Oral Daily Urmila MustNUNO guaman      furosemide  40 mg Oral BID Urmila Pino PA-C      insulin glargine  50 Units Subcutaneous Q12H [facility] Amrik Gil MD      insulin lispro  2-12 Units Subcutaneous 4x Daily (AC & HS) Urmila Pino PA-C      metoclopramide  10 mg Oral TID PRN Venkata Lund MD      pantoprazole  40 mg Oral Early Morning Urmila Pino PA-C      polyethylene glycol  17 g Oral Daily Urmila Pino PA-C      tamsulosin  0 4 mg Oral Daily With Dinner Urmila Pino PA-C          Today, Patient Was Seen By: Venkata Lund MD    ** Please Note: Dictation voice to text software may have been used in the creation of this document   **

## 2023-04-22 NOTE — ASSESSMENT & PLAN NOTE
Wt Readings from Last 3 Encounters:   04/22/23 126 kg (277 lb 1 9 oz)   09/09/21 127 kg (280 lb)   06/09/21 127 kg (280 lb)     Appears euvolemic on admission, chronic lymphedema    Prior 2D echo reviewed, normal EF   Continue lasix 40mg daily

## 2023-04-22 NOTE — ASSESSMENT & PLAN NOTE
Lab Results   Component Value Date    HGBA1C 6 7 (A) 02/02/2023     Home: Insulin regimen 80 units BID  Add sliding scale, algorithm 4  Continue lantus 50 units BID while in hospital

## 2023-04-22 NOTE — PLAN OF CARE
Problem: MOBILITY - ADULT  Goal: Maintain or return to baseline ADL function  Description: INTERVENTIONS:  -  Assess patient's ability to carry out ADLs; assess patient's baseline for ADL function and identify physical deficits which impact ability to perform ADLs (bathing, care of mouth/teeth, toileting, grooming, dressing, etc )  - Assess/evaluate cause of self-care deficits   - Assess range of motion  - Assess patient's mobility; develop plan if impaired  - Assess patient's need for assistive devices and provide as appropriate  - Encourage maximum independence but intervene and supervise when necessary  - Involve family in performance of ADLs  - Assess for home care needs following discharge   - Consider OT consult to assist with ADL evaluation and planning for discharge  - Provide patient education as appropriate  Outcome: Progressing  Goal: Maintains/Returns to pre admission functional level  Description: INTERVENTIONS:  - Perform BMAT or MOVE assessment daily    - Set and communicate daily mobility goal to care team and patient/family/caregiver  - Collaborate with rehabilitation services on mobility goals if consulted  - Perform Range of Motion 3 times a day  - Reposition patient every 2 hours    - Dangle patient 3 times a day  - Stand patient 3 times a day  - Ambulate patient 3 times a day  - Out of bed to chair 3 times a day   - Out of bed for meals 3 times a day  - Out of bed for toileting  - Record patient progress and toleration of activity level   Outcome: Progressing     Problem: PAIN - ADULT  Goal: Verbalizes/displays adequate comfort level or baseline comfort level  Description: Interventions:  - Encourage patient to monitor pain and request assistance  - Assess pain using appropriate pain scale  - Administer analgesics based on type and severity of pain and evaluate response  - Implement non-pharmacological measures as appropriate and evaluate response  - Consider cultural and social influences on pain and pain management  - Notify physician/advanced practitioner if interventions unsuccessful or patient reports new pain  Outcome: Progressing     Problem: INFECTION - ADULT  Goal: Absence or prevention of progression during hospitalization  Description: INTERVENTIONS:  - Assess and monitor for signs and symptoms of infection  - Monitor lab/diagnostic results  - Monitor all insertion sites, i e  indwelling lines, tubes, and drains  - Monitor endotracheal if appropriate and nasal secretions for changes in amount and color  - [place] appropriate cooling/warming therapies per order  - Administer medications as ordered  - Instruct and encourage patient and family to use good hand hygiene technique  - Identify and instruct in appropriate isolation precautions for identified infection/condition  Outcome: Progressing     Problem: DISCHARGE PLANNING  Goal: Discharge to home or other facility with appropriate resources  Description: INTERVENTIONS:  - Identify barriers to discharge w/patient and caregiver  - Arrange for needed discharge resources and transportation as appropriate  - Identify discharge learning needs (meds, wound care, etc )  - Arrange for interpretive services to assist at discharge as needed  - Refer to Case Management Department for coordinating discharge planning if the patient needs post-hospital services based on physician/advanced practitioner order or complex needs related to functional status, cognitive ability, or social support system  Outcome: Progressing     Problem: Prexisting or High Potential for Compromised Skin Integrity  Goal: Skin integrity is maintained or improved  Description: INTERVENTIONS:  - Identify patients at risk for skin breakdown  - Assess and monitor skin integrity  - Assess and monitor nutrition and hydration status  - Monitor labs   - Assess for incontinence   - Turn and reposition patient  - Assist with mobility/ambulation  - Relieve pressure over bony prominences  - Avoid friction and shearing  - Provide appropriate hygiene as needed including keeping skin clean and dry  - Evaluate need for skin moisturizer/barrier cream  - Collaborate with interdisciplinary team   - Patient/family teaching  - Consider wound care consult   Outcome: Progressing

## 2023-04-22 NOTE — PLAN OF CARE
Problem: MOBILITY - ADULT  Goal: Maintain or return to baseline ADL function  Description: INTERVENTIONS:  -  Assess patient's ability to carry out ADLs; assess patient's baseline for ADL function and identify physical deficits which impact ability to perform ADLs (bathing, care of mouth/teeth, toileting, grooming, dressing, etc )  - Assess/evaluate cause of self-care deficits   - Assess range of motion  - Assess patient's mobility; develop plan if impaired  - Assess patient's need for assistive devices and provide as appropriate  - Encourage maximum independence but intervene and supervise when necessary  - Involve family in performance of ADLs  - Assess for home care needs following discharge   - Consider OT consult to assist with ADL evaluation and planning for discharge  - Provide patient education as appropriate  Outcome: Progressing  Goal: Maintains/Returns to pre admission functional level  Description: INTERVENTIONS:  - Perform BMAT or MOVE assessment daily    - Set and communicate daily mobility goal to care team and patient/family/caregiver  - Collaborate with rehabilitation services on mobility goals if consulted  - Perform Range of Motion  times a day  - Reposition patient every  hours    - Dangle patient  times a day  - Stand patient  times a day  - Ambulate patient  times a day  - Out of bed to chair  times a day   - Out of bed for meals  times a day  - Out of bed for toileting  - Record patient progress and toleration of activity level   Outcome: Progressing     Problem: PAIN - ADULT  Goal: Verbalizes/displays adequate comfort level or baseline comfort level  Description: Interventions:  - Encourage patient to monitor pain and request assistance  - Assess pain using appropriate pain scale  - Administer analgesics based on type and severity of pain and evaluate response  - Implement non-pharmacological measures as appropriate and evaluate response  - Consider cultural and social influences on pain and pain management  - Notify physician/advanced practitioner if interventions unsuccessful or patient reports new pain  Outcome: Progressing     Problem: INFECTION - ADULT  Goal: Absence or prevention of progression during hospitalization  Description: INTERVENTIONS:  - Assess and monitor for signs and symptoms of infection  - Monitor lab/diagnostic results  - Monitor all insertion sites, i e  indwelling lines, tubes, and drains  - Monitor endotracheal if appropriate and nasal secretions for changes in amount and color  - [place] appropriate cooling/warming therapies per order  - Administer medications as ordered  - Instruct and encourage patient and family to use good hand hygiene technique  - Identify and instruct in appropriate isolation precautions for identified infection/condition  Outcome: Progressing     Problem: DISCHARGE PLANNING  Goal: Discharge to home or other facility with appropriate resources  Description: INTERVENTIONS:  - Identify barriers to discharge w/patient and caregiver  - Arrange for needed discharge resources and transportation as appropriate  - Identify discharge learning needs (meds, wound care, etc )  - Arrange for interpretive services to assist at discharge as needed  - Refer to Case Management Department for coordinating discharge planning if the patient needs post-hospital services based on physician/advanced practitioner order or complex needs related to functional status, cognitive ability, or social support system  Outcome: Progressing     Problem: Prexisting or High Potential for Compromised Skin Integrity  Goal: Skin integrity is maintained or improved  Description: INTERVENTIONS:  - Identify patients at risk for skin breakdown  - Assess and monitor skin integrity  - Assess and monitor nutrition and hydration status  - Monitor labs   - Assess for incontinence   - Turn and reposition patient  - Assist with mobility/ambulation  - Relieve pressure over bony prominences  - Avoid friction and shearing  - Provide appropriate hygiene as needed including keeping skin clean and dry  - Evaluate need for skin moisturizer/barrier cream  - Collaborate with interdisciplinary team   - Patient/family teaching  - Consider wound care consult   Outcome: Progressing

## 2023-04-22 NOTE — ASSESSMENT & PLAN NOTE
Presents with RLE foot wound, compression stocking tourniquet around R ankle with exposed tendon, cellulitic changes   Concern for osteomyelitis versus cellulitis   Wound cx growing GNRs pantoea species susceptible to cephalosporin   MRI showing diffuse subcutaneous edema without definitive osteomyelitis   Lead study shows diffuse disease of right lower extremity   Appreciate vascular surgery input, no intervention at this time, patient with high risk of limb loss in the future   Podiatry recommending local wound care, will need close outpatient follow up   Pending rehab placement CM following   Transition IV abx to keflex to complete 10 day course

## 2023-04-23 LAB
GLUCOSE SERPL-MCNC: 116 MG/DL (ref 65–140)
GLUCOSE SERPL-MCNC: 136 MG/DL (ref 65–140)
GLUCOSE SERPL-MCNC: 172 MG/DL (ref 65–140)
GLUCOSE SERPL-MCNC: 77 MG/DL (ref 65–140)

## 2023-04-23 RX ORDER — INSULIN GLARGINE 100 [IU]/ML
40 INJECTION, SOLUTION SUBCUTANEOUS EVERY 12 HOURS SCHEDULED
Status: DISCONTINUED | OUTPATIENT
Start: 2023-04-23 | End: 2023-04-23

## 2023-04-23 RX ORDER — INSULIN GLARGINE 100 [IU]/ML
40 INJECTION, SOLUTION SUBCUTANEOUS EVERY 12 HOURS SCHEDULED
Status: DISCONTINUED | OUTPATIENT
Start: 2023-04-24 | End: 2023-05-02 | Stop reason: HOSPADM

## 2023-04-23 RX ORDER — INSULIN GLARGINE 100 [IU]/ML
30 INJECTION, SOLUTION SUBCUTANEOUS ONCE
Status: COMPLETED | OUTPATIENT
Start: 2023-04-23 | End: 2023-04-23

## 2023-04-23 RX ORDER — INSULIN GLARGINE 100 [IU]/ML
30 INJECTION, SOLUTION SUBCUTANEOUS EVERY 12 HOURS SCHEDULED
Status: DISCONTINUED | OUTPATIENT
Start: 2023-04-24 | End: 2023-04-23

## 2023-04-23 RX ADMIN — PANTOPRAZOLE SODIUM 40 MG: 40 TABLET, DELAYED RELEASE ORAL at 05:07

## 2023-04-23 RX ADMIN — INSULIN GLARGINE 40 UNITS: 100 INJECTION, SOLUTION SUBCUTANEOUS at 08:38

## 2023-04-23 RX ADMIN — INSULIN GLARGINE 30 UNITS: 100 INJECTION, SOLUTION SUBCUTANEOUS at 22:03

## 2023-04-23 RX ADMIN — FUROSEMIDE 40 MG: 40 TABLET ORAL at 17:10

## 2023-04-23 RX ADMIN — INSULIN LISPRO 2 UNITS: 100 INJECTION, SOLUTION INTRAVENOUS; SUBCUTANEOUS at 16:38

## 2023-04-23 RX ADMIN — ASPIRIN 81 MG: 81 TABLET, COATED ORAL at 08:14

## 2023-04-23 RX ADMIN — CLOPIDOGREL BISULFATE 75 MG: 75 TABLET ORAL at 08:14

## 2023-04-23 RX ADMIN — CEPHALEXIN 500 MG: 500 CAPSULE ORAL at 17:10

## 2023-04-23 RX ADMIN — FUROSEMIDE 40 MG: 40 TABLET ORAL at 08:14

## 2023-04-23 RX ADMIN — CEPHALEXIN 500 MG: 500 CAPSULE ORAL at 11:43

## 2023-04-23 RX ADMIN — TAMSULOSIN HYDROCHLORIDE 0.4 MG: 0.4 CAPSULE ORAL at 16:04

## 2023-04-23 RX ADMIN — FINASTERIDE 5 MG: 5 TABLET, FILM COATED ORAL at 08:14

## 2023-04-23 RX ADMIN — CEPHALEXIN 500 MG: 500 CAPSULE ORAL at 05:07

## 2023-04-23 NOTE — ASSESSMENT & PLAN NOTE
Lab Results   Component Value Date    HGBA1C 6 7 (A) 02/02/2023     Home: Insulin regimen 80 units BID  Continue sliding scale, algorithm 4  Decrease lantus 40 units from 50 BID   Morning BG 77

## 2023-04-23 NOTE — ASSESSMENT & PLAN NOTE
Wt Readings from Last 3 Encounters:   04/23/23 126 kg (277 lb 1 9 oz)   09/09/21 127 kg (280 lb)   06/09/21 127 kg (280 lb)     Appears euvolemic on admission, chronic lymphedema    Prior 2D echo reviewed, normal EF   Continue lasix 40mg daily

## 2023-04-23 NOTE — PLAN OF CARE
Problem: MOBILITY - ADULT  Goal: Maintain or return to baseline ADL function  Description: INTERVENTIONS:  -  Assess patient's ability to carry out ADLs; assess patient's baseline for ADL function and identify physical deficits which impact ability to perform ADLs (bathing, care of mouth/teeth, toileting, grooming, dressing, etc )  - Assess/evaluate cause of self-care deficits   - Assess range of motion  - Assess patient's mobility; develop plan if impaired  - Assess patient's need for assistive devices and provide as appropriate  - Encourage maximum independence but intervene and supervise when necessary  - Involve family in performance of ADLs  - Assess for home care needs following discharge   - Consider OT consult to assist with ADL evaluation and planning for discharge  - Provide patient education as appropriate  Outcome: Progressing     Problem: PAIN - ADULT  Goal: Verbalizes/displays adequate comfort level or baseline comfort level  Description: Interventions:  - Encourage patient to monitor pain and request assistance  - Assess pain using appropriate pain scale  - Administer analgesics based on type and severity of pain and evaluate response  - Implement non-pharmacological measures as appropriate and evaluate response  - Consider cultural and social influences on pain and pain management  - Notify physician/advanced practitioner if interventions unsuccessful or patient reports new pain  Outcome: Progressing     Problem: INFECTION - ADULT  Goal: Absence or prevention of progression during hospitalization  Description: INTERVENTIONS:  - Assess and monitor for signs and symptoms of infection  - Monitor lab/diagnostic results  - Monitor all insertion sites, i e  indwelling lines, tubes, and drains  - Monitor endotracheal if appropriate and nasal secretions for changes in amount and color  - [place] appropriate cooling/warming therapies per order  - Administer medications as ordered  - Instruct and encourage patient and family to use good hand hygiene technique  - Identify and instruct in appropriate isolation precautions for identified infection/condition  Outcome: Progressing     Problem: DISCHARGE PLANNING  Goal: Discharge to home or other facility with appropriate resources  Description: INTERVENTIONS:  - Identify barriers to discharge w/patient and caregiver  - Arrange for needed discharge resources and transportation as appropriate  - Identify discharge learning needs (meds, wound care, etc )  - Arrange for interpretive services to assist at discharge as needed  - Refer to Case Management Department for coordinating discharge planning if the patient needs post-hospital services based on physician/advanced practitioner order or complex needs related to functional status, cognitive ability, or social support system  Outcome: Progressing     Problem: Prexisting or High Potential for Compromised Skin Integrity  Goal: Skin integrity is maintained or improved  Description: INTERVENTIONS:  - Identify patients at risk for skin breakdown  - Assess and monitor skin integrity  - Assess and monitor nutrition and hydration status  - Monitor labs   - Assess for incontinence   - Turn and reposition patient  - Assist with mobility/ambulation  - Relieve pressure over bony prominences  - Avoid friction and shearing  - Provide appropriate hygiene as needed including keeping skin clean and dry  - Evaluate need for skin moisturizer/barrier cream  - Collaborate with interdisciplinary team   - Patient/family teaching  - Consider wound care consult   Outcome: Progressing

## 2023-04-23 NOTE — PROGRESS NOTES
[address] [place] [address]  Progress Note  Name: Bolivar Alas I  MRN: [id number]  Unit/Bed#: E5 -01 I Date of Admission: 4/17/2023   Date of Service: 4/23/2023 I Hospital Day: 6    Assessment/Plan   CAD (coronary artery disease)  Assessment & Plan  Continue statin, plavix    Diabetic autonomic neuropathy associated with type 2 diabetes mellitus [facility])  Assessment & Plan  Lab Results   Component Value Date    HGBA1C 6 7 (A) 02/02/2023     Home: Insulin regimen 80 units BID  Continue sliding scale, algorithm 4  Decrease lantus 40 units from 50 BID   Morning BG 77    Chronic diastolic heart failure (HCC)  Assessment & Plan  Wt Readings from Last 3 Encounters:   04/23/23 126 kg (277 lb 1 9 oz)   09/09/21 127 kg (280 lb)   06/09/21 127 kg (280 lb)     Appears euvolemic on admission, chronic lymphedema    Prior 2D echo reviewed, normal EF   Continue lasix 40mg daily    Diabetic gastroparesis (HCC)  Assessment & Plan  Continue PPI once daily  Add reglan 5mg TID PRN with meals  Recommend small frequent meals    * Cellulitis of right ankle  Assessment & Plan  Presents with RLE foot wound, compression stocking tourniquet around R ankle with exposed tendon, cellulitic changes   Concern for osteomyelitis versus cellulitis   Wound cx growing GNRs pantoea species susceptible to cephalosporin   MRI showing diffuse subcutaneous edema without definitive osteomyelitis   Lead study shows diffuse disease of right lower extremity   Appreciate vascular surgery input, no intervention at this time, patient with high risk of limb loss in the future   Podiatry recommending local wound care, will need close outpatient follow up   Pending rehab placement CM following   Transition IV abx to keflex to complete 10 day course           VTE Pharmacologic Prophylaxis:   Pharmacologic: Enoxaparin (Lovenox)  Mechanical VTE Prophylaxis in Place: Yes    Patient Centered Rounds: I have performed bedside rounds with nursing staff today     Current Length of Stay: 6 day(s)    Current Patient Status: Inpatient   Certification Statement: The patient will continue to require additional inpatient hospital stay due to pending placement    Discharge Plan: pending rehab    Code Status: Level 1 - Full Code      Subjective:   No events overnight  Tolerating diet  BG 77 this morning, denies dizziness, lightheadedness  Objective:     Vitals:   Temp (24hrs), Av 8 °F (36 6 °C), Min:97 7 °F (36 5 °C), Max:97 8 °F (36 6 °C)    Temp:  [97 7 °F (36 5 °C)-97 8 °F (36 6 °C)] 97 7 °F (36 5 °C)  Resp:  [18] 18  BP: ()/(52-67) 129/67  Body mass index is 36 56 kg/m²  Input and Output Summary (last 24 hours): Intake/Output Summary (Last 24 hours) at 2023 1100  Last data filed at 2023 0316  Gross per 24 hour   Intake 240 ml   Output 2000 ml   Net -1760 ml       Physical Exam:     Physical Exam  Vitals and nursing note reviewed  Constitutional:       General: He is not in acute distress  Appearance: He is well-developed  He is obese  HENT:      Head: Normocephalic and atraumatic  Eyes:      General: No scleral icterus  Conjunctiva/sclera: Conjunctivae normal    Cardiovascular:      Rate and Rhythm: Normal rate and regular rhythm  Pulmonary:      Effort: Pulmonary effort is normal  No respiratory distress  Abdominal:      Palpations: Abdomen is soft  Tenderness: There is no abdominal tenderness  Musculoskeletal:         General: No swelling  Comments: RL lymphedema, dry skin, exposed tendon   Skin:     General: Skin is warm and dry  Neurological:      Mental Status: He is alert  Mental status is at baseline     Psychiatric:         Mood and Affect: Mood normal        Additional Data:     Labs:    Results from last 7 days   Lab Units 23  0528   WBC Thousand/uL 8 22   HEMOGLOBIN g/dL 12 1   HEMATOCRIT % 37 5   PLATELETS Thousands/uL 267   NEUTROS PCT % 70   LYMPHS PCT % 19   MONOS PCT % 7   EOS PCT % 3 Results from last 7 days   Lab Units 04/21/23  0528 04/19/23  0450   SODIUM mmol/L 135 134*   POTASSIUM mmol/L 3 8 3 9   CHLORIDE mmol/L 97 97   CO2 mmol/L 29 30   BUN mg/dL 16 15   CREATININE mg/dL 0 96 1 00   ANION GAP mmol/L 9 7   CALCIUM mg/dL 8 7 8 8   ALBUMIN g/dL  --  3 2*   TOTAL BILIRUBIN mg/dL  --  0 44   ALK PHOS U/L  --  35   ALT U/L  --  9   AST U/L  --  12*   GLUCOSE RANDOM mg/dL 101 131         Results from last 7 days   Lab Units 04/23/23  0808 04/22/23  2048 04/22/23  1605 04/22/23  1053 04/22/23  0803 04/21/23  2058 04/21/23  1549 04/21/23  1126 04/21/23  0732 04/20/23  2137 04/20/23  1551 04/20/23  1055   POC GLUCOSE mg/dl 77 171* 125 199* 132 156* 125 144* 93 107 101 181*                   * I Have Reviewed All Lab Data Listed Above  * Additional Pertinent Lab Tests Reviewed: All Labs For Current Hospital Admission Reviewed    Imaging:    XR ankle 3+ views RIGHT    Result Date: 4/18/2023  Impression: No acute osseous abnormality  Workstation performed: [id number]     XR foot 3+ views RIGHT    Result Date: 4/18/2023  Impression: Nondisplaced fracture, terminal tuft of the right 1st toe  Findings concur with the referring clinician's preliminary interpretation already in the patient's electronic health record  Workstation performed: [id number]     MRI ankle/heel right wo contrast    Result Date: 4/20/2023  Impression: Severe subcutaneous edema with ulceration seen medially as above  There is mild marrow edema along the medial malleolus, nonspecific with differential considerations as above  No confluent decreased T1 marrow signal to definitively suggest osteomyelitis at this time   Workstation performed: [id number]       Recent Cultures (last 7 days):     Results from last 7 days   Lab Units 04/17/23 2139   GRAM STAIN RESULT  2+ Polys*  2+ Gram negative rods*   WOUND CULTURE  2+ Growth of - Pantoea septica Pantoea species*       Last 24 Hours Medication List:   Current Facility-Administered Medications   Medication Dose Route Frequency Provider Last Rate    acetaminophen  650 mg Oral Q6H PRN ANNY Nino-JESSICA      aluminum-magnesium hydroxide-simethicone  30 mL Oral Q6H PRN Marianela Horn PA-C      aspirin  81 mg Oral Daily Marianela Horn, PA-JESSICA      atorvastatin  80 mg Oral After Den Priestly, NUNO      cephalexin  500 mg Oral Q6H [address] Kev Chappell MD      clopidogrel  75 mg Oral Daily Marianela Horn PA-C      enoxaparin  40 mg Subcutaneous Daily Marianela Horn PA-C      finasteride  5 mg Oral Daily Marianela Horn, NUNO      furosemide  40 mg Oral BID Marianela Horn PA-C      insulin glargine  40 Units Subcutaneous Q12H [address] Amrik Chappell MD      insulin lispro  2-12 Units Subcutaneous 4x Daily (AC & HS) Marianela Horn PA-C      metoclopramide  10 mg Oral TID PRN Blane Suarez MD      pantoprazole  40 mg Oral Early Morning Marianela Horn PA-C      polyethylene glycol  17 g Oral Daily Marianela Horn PA-C      tamsulosin  0 4 mg Oral Daily With Dinner Marianela Horn PA-C          Today, Patient Was Seen By: Blane Suarez MD    ** Please Note: Dictation voice to text software may have been used in the creation of this document   **

## 2023-04-24 ENCOUNTER — TELEPHONE (OUTPATIENT)
Dept: PODIATRY | Facility: CLINIC | Age: 63
End: 2023-04-24

## 2023-04-24 PROBLEM — S92.919A FRACTURE, TOE: Status: ACTIVE | Noted: 2023-04-24

## 2023-04-24 LAB
ANION GAP SERPL CALCULATED.3IONS-SCNC: 6 MMOL/L (ref 4–13)
BASOPHILS # BLD AUTO: 0.07 THOUSANDS/ΜL (ref 0–0.1)
BASOPHILS NFR BLD AUTO: 1 % (ref 0–1)
BUN SERPL-MCNC: 19 MG/DL (ref 5–25)
CALCIUM SERPL-MCNC: 8.9 MG/DL (ref 8.4–10.2)
CHLORIDE SERPL-SCNC: 95 MMOL/L (ref 96–108)
CO2 SERPL-SCNC: 33 MMOL/L (ref 21–32)
CREAT SERPL-MCNC: 0.94 MG/DL (ref 0.6–1.3)
EOSINOPHIL # BLD AUTO: 0.23 THOUSAND/ΜL (ref 0–0.61)
EOSINOPHIL NFR BLD AUTO: 3 % (ref 0–6)
ERYTHROCYTE [DISTWIDTH] IN BLOOD BY AUTOMATED COUNT: 13.7 % (ref 11.6–15.1)
GFR SERPL CREATININE-BSD FRML MDRD: 85 ML/MIN/1.73SQ M
GLUCOSE SERPL-MCNC: 107 MG/DL (ref 65–140)
GLUCOSE SERPL-MCNC: 118 MG/DL (ref 65–140)
GLUCOSE SERPL-MCNC: 141 MG/DL (ref 65–140)
GLUCOSE SERPL-MCNC: 144 MG/DL (ref 65–140)
GLUCOSE SERPL-MCNC: 165 MG/DL (ref 65–140)
GLUCOSE SERPL-MCNC: 196 MG/DL (ref 65–140)
HCT VFR BLD AUTO: 39 % (ref 36.5–49.3)
HGB BLD-MCNC: 12.4 G/DL (ref 12–17)
IMM GRANULOCYTES # BLD AUTO: 0.02 THOUSAND/UL (ref 0–0.2)
IMM GRANULOCYTES NFR BLD AUTO: 0 % (ref 0–2)
LYMPHOCYTES # BLD AUTO: 1.59 THOUSANDS/ΜL (ref 0.6–4.47)
LYMPHOCYTES NFR BLD AUTO: 18 % (ref 14–44)
MCH RBC QN AUTO: 27.8 PG (ref 26.8–34.3)
MCHC RBC AUTO-ENTMCNC: 31.8 G/DL (ref 31.4–37.4)
MCV RBC AUTO: 87 FL (ref 82–98)
MONOCYTES # BLD AUTO: 0.57 THOUSAND/ΜL (ref 0.17–1.22)
MONOCYTES NFR BLD AUTO: 6 % (ref 4–12)
NEUTROPHILS # BLD AUTO: 6.55 THOUSANDS/ΜL (ref 1.85–7.62)
NEUTS SEG NFR BLD AUTO: 72 % (ref 43–75)
NRBC BLD AUTO-RTO: 0 /100 WBCS
PLATELET # BLD AUTO: 279 THOUSANDS/UL (ref 149–390)
PMV BLD AUTO: 9.4 FL (ref 8.9–12.7)
POTASSIUM SERPL-SCNC: 4.4 MMOL/L (ref 3.5–5.3)
RBC # BLD AUTO: 4.46 MILLION/UL (ref 3.88–5.62)
SODIUM SERPL-SCNC: 134 MMOL/L (ref 135–147)
WBC # BLD AUTO: 9.03 THOUSAND/UL (ref 4.31–10.16)

## 2023-04-24 RX ADMIN — CEPHALEXIN 500 MG: 500 CAPSULE ORAL at 05:42

## 2023-04-24 RX ADMIN — CEPHALEXIN 500 MG: 500 CAPSULE ORAL at 17:44

## 2023-04-24 RX ADMIN — CEPHALEXIN 500 MG: 500 CAPSULE ORAL at 11:31

## 2023-04-24 RX ADMIN — FUROSEMIDE 40 MG: 40 TABLET ORAL at 08:37

## 2023-04-24 RX ADMIN — PANTOPRAZOLE SODIUM 40 MG: 40 TABLET, DELAYED RELEASE ORAL at 05:42

## 2023-04-24 RX ADMIN — INSULIN LISPRO 2 UNITS: 100 INJECTION, SOLUTION INTRAVENOUS; SUBCUTANEOUS at 17:44

## 2023-04-24 RX ADMIN — ASPIRIN 81 MG: 81 TABLET, COATED ORAL at 08:37

## 2023-04-24 RX ADMIN — INSULIN GLARGINE 40 UNITS: 100 INJECTION, SOLUTION SUBCUTANEOUS at 22:24

## 2023-04-24 RX ADMIN — ATORVASTATIN CALCIUM 80 MG: 80 TABLET, FILM COATED ORAL at 17:45

## 2023-04-24 RX ADMIN — CLOPIDOGREL BISULFATE 75 MG: 75 TABLET ORAL at 08:37

## 2023-04-24 RX ADMIN — POLYETHYLENE GLYCOL 3350 17 G: 17 POWDER, FOR SOLUTION ORAL at 17:51

## 2023-04-24 RX ADMIN — FINASTERIDE 5 MG: 5 TABLET, FILM COATED ORAL at 08:37

## 2023-04-24 RX ADMIN — CEPHALEXIN 500 MG: 500 CAPSULE ORAL at 00:22

## 2023-04-24 RX ADMIN — TAMSULOSIN HYDROCHLORIDE 0.4 MG: 0.4 CAPSULE ORAL at 17:44

## 2023-04-24 RX ADMIN — FUROSEMIDE 40 MG: 40 TABLET ORAL at 17:44

## 2023-04-24 RX ADMIN — INSULIN LISPRO 2 UNITS: 100 INJECTION, SOLUTION INTRAVENOUS; SUBCUTANEOUS at 22:24

## 2023-04-24 RX ADMIN — INSULIN GLARGINE 40 UNITS: 100 INJECTION, SOLUTION SUBCUTANEOUS at 08:36

## 2023-04-24 NOTE — PLAN OF CARE
Problem: MOBILITY - ADULT  Goal: Maintain or return to baseline ADL function  Description: INTERVENTIONS:  -  Assess patient's ability to carry out ADLs; assess patient's baseline for ADL function and identify physical deficits which impact ability to perform ADLs (bathing, care of mouth/teeth, toileting, grooming, dressing, etc )  - Assess/evaluate cause of self-care deficits   - Assess range of motion  - Assess patient's mobility; develop plan if impaired  - Assess patient's need for assistive devices and provide as appropriate  - Encourage maximum independence but intervene and supervise when necessary  - Involve family in performance of ADLs  - Assess for home care needs following discharge   - Consider OT consult to assist with ADL evaluation and planning for discharge  - Provide patient education as appropriate  Outcome: Progressing  Goal: Maintains/Returns to pre admission functional level  Description: INTERVENTIONS:  - Perform BMAT or MOVE assessment daily    - Set and communicate daily mobility goal to care team and patient/family/caregiver  - Collaborate with rehabilitation services on mobility goals if consulted  - Perform Range of Motion 3 times a day  - Reposition patient every 2 hours    - Dangle patient 3 times a day  - Stand patient 3 times a day  - Ambulate patient 3 times a day  - Out of bed to chair 3 times a day   - Out of bed for meals 3 times a day  - Out of bed for toileting  - Record patient progress and toleration of activity level   Outcome: Progressing     Problem: PAIN - ADULT  Goal: Verbalizes/displays adequate comfort level or baseline comfort level  Description: Interventions:  - Encourage patient to monitor pain and request assistance  - Assess pain using appropriate pain scale  - Administer analgesics based on type and severity of pain and evaluate response  - Implement non-pharmacological measures as appropriate and evaluate response  - Consider cultural and social influences on pain and pain management  - Notify physician/advanced practitioner if interventions unsuccessful or patient reports new pain  Outcome: Progressing     Problem: INFECTION - ADULT  Goal: Absence or prevention of progression during hospitalization  Description: INTERVENTIONS:  - Assess and monitor for signs and symptoms of infection  - Monitor lab/diagnostic results  - Monitor all insertion sites, i e  indwelling lines, tubes, and drains  - Monitor endotracheal if appropriate and nasal secretions for changes in amount and color  - [place] appropriate cooling/warming therapies per order  - Administer medications as ordered  - Instruct and encourage patient and family to use good hand hygiene technique  - Identify and instruct in appropriate isolation precautions for identified infection/condition  Outcome: Progressing     Problem: DISCHARGE PLANNING  Goal: Discharge to home or other facility with appropriate resources  Description: INTERVENTIONS:  - Identify barriers to discharge w/patient and caregiver  - Arrange for needed discharge resources and transportation as appropriate  - Identify discharge learning needs (meds, wound care, etc )  - Arrange for interpretive services to assist at discharge as needed  - Refer to Case Management Department for coordinating discharge planning if the patient needs post-hospital services based on physician/advanced practitioner order or complex needs related to functional status, cognitive ability, or social support system  Outcome: Progressing     Problem: Prexisting or High Potential for Compromised Skin Integrity  Goal: Skin integrity is maintained or improved  Description: INTERVENTIONS:  - Identify patients at risk for skin breakdown  - Assess and monitor skin integrity  - Assess and monitor nutrition and hydration status  - Monitor labs   - Assess for incontinence   - Turn and reposition patient  - Assist with mobility/ambulation  - Relieve pressure over bony prominences  - Avoid friction and shearing  - Provide appropriate hygiene as needed including keeping skin clean and dry  - Evaluate need for skin moisturizer/barrier cream  - Collaborate with interdisciplinary team   - Patient/family teaching  - Consider wound care consult   Outcome: Progressing

## 2023-04-24 NOTE — TELEPHONE ENCOUNTER
Caller: Patient    Doctor: [facility]    Reason for call: Requests to speak with Dr Austin Castillo    Call back#: [phone]

## 2023-04-24 NOTE — ASSESSMENT & PLAN NOTE
"· Patient suffered an injury to his right first toe when he \"stubbed it\"  · X-ray revealed \"nondisplaced fracture, terminal tuft of the right 1st toe  \"  · Patient was eval to podiatry team: No current intervention required    Continue wound care with Betadine  "

## 2023-04-24 NOTE — PLAN OF CARE
Problem: MOBILITY - ADULT  Goal: Maintain or return to baseline ADL function  Description: INTERVENTIONS:  -  Assess patient's ability to carry out ADLs; assess patient's baseline for ADL function and identify physical deficits which impact ability to perform ADLs (bathing, care of mouth/teeth, toileting, grooming, dressing, etc )  - Assess/evaluate cause of self-care deficits   - Assess range of motion  - Assess patient's mobility; develop plan if impaired  - Assess patient's need for assistive devices and provide as appropriate  - Encourage maximum independence but intervene and supervise when necessary  - Involve family in performance of ADLs  - Assess for home care needs following discharge   - Consider OT consult to assist with ADL evaluation and planning for discharge  - Provide patient education as appropriate  Outcome: Progressing  Goal: Maintains/Returns to pre admission functional level  Description: INTERVENTIONS:  - Perform BMAT or MOVE assessment daily    - Set and communicate daily mobility goal to care team and patient/family/caregiver  - Collaborate with rehabilitation services on mobility goals if consulted  - Perform Range of Motion  times a day  - Reposition patient every  hours    - Dangle patient  times a day  - Stand patient  times a day  - Ambulate patient  times a day  - Out of bed to chair  times a day   - Out of bed for meals times a day  - Out of bed for toileting  - Record patient progress and toleration of activity level   Outcome: Progressing     Problem: PAIN - ADULT  Goal: Verbalizes/displays adequate comfort level or baseline comfort level  Description: Interventions:  - Encourage patient to monitor pain and request assistance  - Assess pain using appropriate pain scale  - Administer analgesics based on type and severity of pain and evaluate response  - Implement non-pharmacological measures as appropriate and evaluate response  - Consider cultural and social influences on pain and pain management  - Notify physician/advanced practitioner if interventions unsuccessful or patient reports new pain  Outcome: Progressing     Problem: INFECTION - ADULT  Goal: Absence or prevention of progression during hospitalization  Description: INTERVENTIONS:  - Assess and monitor for signs and symptoms of infection  - Monitor lab/diagnostic results  - Monitor all insertion sites, i e  indwelling lines, tubes, and drains  - Monitor endotracheal if appropriate and nasal secretions for changes in amount and color  - [place] appropriate cooling/warming therapies per order  - Administer medications as ordered  - Instruct and encourage patient and family to use good hand hygiene technique  - Identify and instruct in appropriate isolation precautions for identified infection/condition  Outcome: Progressing     Problem: DISCHARGE PLANNING  Goal: Discharge to home or other facility with appropriate resources  Description: INTERVENTIONS:  - Identify barriers to discharge w/patient and caregiver  - Arrange for needed discharge resources and transportation as appropriate  - Identify discharge learning needs (meds, wound care, etc )  - Arrange for interpretive services to assist at discharge as needed  - Refer to Case Management Department for coordinating discharge planning if the patient needs post-hospital services based on physician/advanced practitioner order or complex needs related to functional status, cognitive ability, or social support system  Outcome: Progressing     Problem: Prexisting or High Potential for Compromised Skin Integrity  Goal: Skin integrity is maintained or improved  Description: INTERVENTIONS:  - Identify patients at risk for skin breakdown  - Assess and monitor skin integrity  - Assess and monitor nutrition and hydration status  - Monitor labs   - Assess for incontinence   - Turn and reposition patient  - Assist with mobility/ambulation  - Relieve pressure over bony prominences  - Avoid friction and shearing  - Provide appropriate hygiene as needed including keeping skin clean and dry  - Evaluate need for skin moisturizer/barrier cream  - Collaborate with interdisciplinary team   - Patient/family teaching  - Consider wound care consult   Outcome: Progressing

## 2023-04-24 NOTE — PROGRESS NOTES
"Podiatry - Progress Note  Patient: Rashida Obrien 60 y o  male   MRN: [id number]  PCP: Jorgito Elena DO  Unit/Bed#: E5 -01 Encounter: [id number]  Date Of Visit: 23    ASSESSMENT:    Rashida Obrien is a 60 y o  male with:    1  Right ankle wound with cellulitis  2  Right great toe wound secondary to injury  3  H/o left BKA  4  PAD  5  Uncontrolled Type 2 Diabetes  6  RLE lymphedema     PLAN:    · Continue local wound care and compression, appreciate nursing assistance with dressing changes  · Elevation and offloading on green foam wedges or pillows when non-ambulatory  · Rest of care per primary team   · Patient is stable for d/c from a podiatry standpoint  Will benefit from VNA and outpatient wound care follow-up following rehab  Weightbearing status: Weightbearing as tolerated    SUBJECTIVE:     The patient was seen, evaluated, and assessed at bedside today  The patient was awake, alert, and in no acute distress  No acute events overnight  The patient reports no complaints on exam today  Patient denies N/V/F/chills/SOB/CP  OBJECTIVE:     Vitals:   /61   Pulse 70   Temp 98 1 °F (36 7 °C)   Resp 19   Ht 6' 1\" (1 854 m)   Wt 126 kg (277 lb 5 4 oz)   SpO2 95%   BMI 36 59 kg/m²     Temp (24hrs), Av 9 °F (36 6 °C), Min:97 6 °F (36 4 °C), Max:98 1 °F (36 7 °C)      Physical Exam:     Lungs: Non labored breathing  Abdomen: Soft, non-tender  Lower Extremity:  Cardiovascular status at baseline from admission  Neurological status at baseline from admission  Musculoskeletal status at baseline from admission  No calf tenderness noted       Wound #: 1  Location: right ankle  Length 16cm: Width 5cm: Depth 2cm:   Deepest Tissue Noted in Base: tendon  Probe to Bone: No  Peripheral Skin Description: Attached  Granulation: 20% Fibrotic Tissue: 80% Necrotic Tissue: 0%   Drainage Amount: minimal, serosanguinous  Signs of Infection: No     RLE with xerotic, scaling from toes to tib tuberosity " "indicative of longstanding stasis changes  Clinical Images 04/24/23: Additional Data:     Labs:    Results from last 7 days   Lab Units 04/24/23  0807   WBC Thousand/uL 9 03   HEMOGLOBIN g/dL 12 4   HEMATOCRIT % 39 0   PLATELETS Thousands/uL 279   NEUTROS PCT % 72   LYMPHS PCT % 18   MONOS PCT % 6   EOS PCT % 3     Results from last 7 days   Lab Units 04/24/23  0807 04/21/23  0528 04/19/23  0450   POTASSIUM mmol/L 4 4   < > 3 9   CHLORIDE mmol/L 95*   < > 97   CO2 mmol/L 33*   < > 30   BUN mg/dL 19   < > 15   CREATININE mg/dL 0 94   < > 1 00   CALCIUM mg/dL 8 9   < > 8 8   ALK PHOS U/L  --   --  35   ALT U/L  --   --  9   AST U/L  --   --  12*    < > = values in this interval not displayed  * I Have Reviewed All Lab Data Listed Above  Recent Cultures (last 7 days):     Results from last 7 days   Lab Units 04/17/23  0593   GRAM STAIN RESULT  2+ Polys*  2+ Gram negative rods*   WOUND CULTURE  2+ Growth of - Pantoea septica Pantoea species*           Imaging: I have personally reviewed pertinent films in PACS  EKG, Pathology, and Other Studies: I have personally reviewed pertinent reports  ** Please Note: Portions of the record may have been created with voice recognition software  Occasional wrong word or \"sound a like\" substitutions may have occurred due to the inherent limitations of voice recognition software  Read the chart carefully and recognize, using context, where substitutions have occurred   **      "

## 2023-04-24 NOTE — ASSESSMENT & PLAN NOTE
Lab Results   Component Value Date    HGBA1C 6 7 (A) 02/02/2023     · Patient has diabetes type 2, with long-term use of insulin, with associated peripheral arterial disease and neuropathy   · Continue modified insulin regimen   · Home regimen was:  80 units BID  · Due to hypoglycemia his current insulin regimen is Lantus 40 units every 12 hours  · Continue sliding scale, algorithm 4  · Decrease lantus 40 units from 50 BID   Morning BG 7   · improved glycemic control

## 2023-04-24 NOTE — ASSESSMENT & PLAN NOTE
· Patient has coronary artery disease with prior CABG   · continue aspirin, statin, plavix  · No evidence of acute ischemia  · Continue outpatient follow-up

## 2023-04-24 NOTE — ASSESSMENT & PLAN NOTE
· Patient has a history of peripheral arterial disease  · History of previous left BKA in 2018  · Right lower extremity peripheral arterial disease:  · LE ADS with right 50-70% proximal popliteal stenosis  · Patient was evaluated by the vascular surgery team  · Continue aspirin, Plavix, statin  · No current vascular intervention indicated: Vascular surgery notes patient is at high risk of limb loss given extent of injury at his right ankle    Currently has adequate healing potential based on his leads  · Outpatient follow-up

## 2023-04-24 NOTE — ASSESSMENT & PLAN NOTE
Wt Readings from Last 3 Encounters:   04/24/23 126 kg (277 lb 5 4 oz)   09/09/21 127 kg (280 lb)   06/09/21 127 kg (280 lb)     · Pt with h/o chronic diastolic chf  · Most recent 2D echo 2/2020: Normal left ventricular ejection fraction  Grade 1 diastolic dysfunction    · Clinically euvolemic   Continue lasix 40mg daily

## 2023-04-24 NOTE — PROGRESS NOTES
"Formerly [facility]  Progress Note  Name: Dipti Cornejo  MRN: [id number]  Unit/Bed#: E5 -01 I Date of Admission: 4/17/2023   Date of Service: 4/24/2023 I Hospital Day: 7    Assessment/Plan   * Cellulitis of right ankle  Assessment & Plan  Presents with RLE foot wound, compression stocking tourniquet around R ankle with injury down to exposed tendon, cellulitic changes     Pt was followed by the vascular surgery and podiatry teams   Initial concern for osteomyelitis versus cellulitis   MRI showing diffuse subcutaneous edema without definitive osteomyelitis   Wound cx growing GNRs pantoea species susceptible to cephalosporin   LEAD study shows diffuse disease of right lower extremity, but per vascular surgery has adequate flow for healing potential   no vascular intervention at this time, patient with high risk of limb loss in the future   Podiatry recommending local wound care, will need close outpatient follow up   Transitioned IV abx to keflex to complete 10 day total course (day #7/10)    Fracture, toe  Assessment & Plan  · Patient suffered an injury to his right first toe when he \"stubbed it\"  · X-ray revealed \"nondisplaced fracture, terminal tuft of the right 1st toe  \"  · Patient was eval to podiatry team: No current intervention required    Continue wound care with Betadine    CAD (coronary artery disease)  Assessment & Plan  · Patient has coronary artery disease with prior CABG   · continue aspirin, statin, plavix  · No evidence of acute ischemia  · Continue outpatient follow-up    Diabetic autonomic neuropathy associated with type 2 diabetes mellitus ([facility] [address] )  Assessment & Plan  Lab Results   Component Value Date    HGBA1C 6 7 (A) 02/02/2023     · Patient has diabetes type 2, with long-term use of insulin, with associated peripheral arterial disease and neuropathy   · Continue modified insulin regimen   · Home regimen was:  80 units BID  · Due to hypoglycemia his current insulin " regimen is Lantus 40 units every 12 hours  · Continue sliding scale, algorithm 4  · Decrease lantus 40 units from 50 BID  Morning BG 7   · improved glycemic control    Chronic diastolic heart failure (HCC)  Assessment & Plan  Wt Readings from Last 3 Encounters:   04/24/23 126 kg (277 lb 5 4 oz)   09/09/21 127 kg (280 lb)   06/09/21 127 kg (280 lb)     · Pt with h/o chronic diastolic chf  · Most recent 2D echo 2/2020: Normal left ventricular ejection fraction  Grade 1 diastolic dysfunction  · Clinically euvolemic   Continue lasix 40mg daily    Class 2 severe obesity due to excess calories with serious comorbidity and body mass index (BMI) of 35 0 to 35 9 in adult [facility])  Assessment & Plan  BMI 36  Dietary counseling, lifestyle modification    Diabetic gastroparesis (HCC)  Assessment & Plan  Continue PPI once daily  Add reglan 5mg TID PRN with meals  Recommend small frequent meals    PAD (peripheral artery disease) ([address] )  Assessment & Plan  · Patient has a history of peripheral arterial disease  · History of previous left BKA in 2018  · Right lower extremity peripheral arterial disease:  · LE ADS with right 50-70% proximal popliteal stenosis  · Patient was evaluated by the vascular surgery team  · Continue aspirin, Plavix, statin  · No current vascular intervention indicated: Vascular surgery notes patient is at high risk of limb loss given extent of injury at his right ankle  Currently has adequate healing potential based on his leads  · Outpatient follow-up    Hyperlipidemia  Assessment & Plan  Continue statin               Family: Called patient's wife given update    Reviewed test results, and treatment plan    Discussed with patient's nurse and     VTE Pharmacologic Prophylaxis: Enoxaparin (Lovenox)  VTE Mechanical Prophylaxis: sequential compression device        Certification Statement: The patient will continue to require additional inpatient hospital stay due to need for further acute intervention for discharge planning, short-term rehab    Status: inpatient     ===================================================================    Subjective:  Patient notes he feels well  He denies any pain anywhere  He denies any foot pain  He denies any chest pain  He denies any abdominal pain  He denies any back pain  He denies any shortness of breath or dyspnea with movement  He denies any nausea, vomiting, diarrhea  He notes history of intermittent constipation  Currently drinking MiraLAX  Declines any additional laxative  Denies any dizziness or lightheadedness  Denies any other complaints and notes he feels better  Physical Exam:   Temp:  [97 8 °F (36 6 °C)-98 1 °F (36 7 °C)] 97 8 °F (36 6 °C)  HR:  [70-76] 76  Resp:  [18-19] 18  BP: (117-131)/(57-61) 123/59    Gen:  Pleasant, non-tachypnic, non-dyspnic  Conversant  Sitting in a chair at the bedside  Heart: regular rate and rhythm, S1S2 present, no murmur, rub or gallop  Lungs: clear to ausculatation bilaterally  No wheezing, crackles, or rhonchi  No accessory muscle use or respiratory distress  Abd: soft, non-tender, non-distended  NABS, no guarding, rebound or peritoneal signs  Extremities: Left lower extremity BKA noted  Right lower extremity dressing in place  Right lower extremity lymphedema noted  Neuro: awake, alert  Fluent speech  Interactive  Skin: Right lower extremity dressing in place    Right lower extremity lymphedema noted    LABS:   Results from last 7 days   Lab Units 04/24/23  0807 04/21/23  0528 04/19/23  0450   WBC Thousand/uL 9 03 8 22 8 89   HEMOGLOBIN g/dL 12 4 12 1 12 2   HEMATOCRIT % 39 0 37 5 38 5   PLATELETS Thousands/uL 279 267 265     Results from last 7 days   Lab Units 04/24/23  0807 04/21/23  0528 04/19/23  0450   POTASSIUM mmol/L 4 4 3 8 3 9   CHLORIDE mmol/L 95* 97 97   CO2 mmol/L 33* 29 30   BUN mg/dL 19 16 15   CREATININE mg/dL 0 94 0 96 1 00   CALCIUM mg/dL 8 9 8 7 8 8       Hospital Data:    4/18 MRI right ankle/heel  Severe subcutaneous edema with ulceration seen medially as above  There is mild marrow edema along the medial malleolus, nonspecific with differential considerations as above  No confluent decreased T1 marrow signal to definitively suggest osteomyelitis at this time  4/18 LE ADS  RIGHT LOWER LIMB:  There is a 50-75% stenosis in the proximal popliteal artery, and diffuse  atherosclerotic arterial disease throughout the remaining portions of the  femoropopliteal vessels  Unable to visualize the calf vessels due to  depth/edema/induration  Ankle/Brachial index: Unable to obtain due to ulcer/bandaging and induration  (Prior0  95, normal range )  Metatarsal pressure of 123 mm Hg  2nd digit pressure of 73 mm Hg, within the healing range  PVR/ PPG tracings are dampened  LEFT LOWER LIMB:  BKA  Compared to previous study on 8/12/2019, there is no significant change in  disease  Recommend repeat duplex in 1 year to monitor for plaque progression    4/17 right foot x-ra  Nondisplaced fracture, terminal tuft of the right 1st toe  4/17 right ankle x-ray  No acute osseous abnormality    Microbiology:  4/17: Wound culture: 2+ Pantoea septica Pantoea species        ---------------------------------------------------------------------------------------------------------------  This note has been constructed using a voice recognition system

## 2023-04-24 NOTE — ASSESSMENT & PLAN NOTE
Presents with RLE foot wound, compression stocking tourniquet around R ankle with injury down to exposed tendon, cellulitic changes     Pt was followed by the vascular surgery and podiatry teams   Initial concern for osteomyelitis versus cellulitis   MRI showing diffuse subcutaneous edema without definitive osteomyelitis   Wound cx growing GNRs pantoea species susceptible to cephalosporin   LEAD study shows diffuse disease of right lower extremity, but per vascular surgery has adequate flow for healing potential   no vascular intervention at this time, patient with high risk of limb loss in the future   Podiatry recommending local wound care, will need close outpatient follow up   Transitioned IV abx to keflex to complete 10 day total course (day #7/10)

## 2023-04-25 LAB
GLUCOSE SERPL-MCNC: 126 MG/DL (ref 65–140)
GLUCOSE SERPL-MCNC: 163 MG/DL (ref 65–140)
GLUCOSE SERPL-MCNC: 179 MG/DL (ref 65–140)
GLUCOSE SERPL-MCNC: 229 MG/DL (ref 65–140)
SARS-COV-2 RNA RESP QL NAA+PROBE: NEGATIVE

## 2023-04-25 RX ADMIN — INSULIN GLARGINE 40 UNITS: 100 INJECTION, SOLUTION SUBCUTANEOUS at 08:08

## 2023-04-25 RX ADMIN — POLYETHYLENE GLYCOL 3350 17 G: 17 POWDER, FOR SOLUTION ORAL at 08:08

## 2023-04-25 RX ADMIN — INSULIN LISPRO 4 UNITS: 100 INJECTION, SOLUTION INTRAVENOUS; SUBCUTANEOUS at 16:20

## 2023-04-25 RX ADMIN — FINASTERIDE 5 MG: 5 TABLET, FILM COATED ORAL at 08:08

## 2023-04-25 RX ADMIN — CEPHALEXIN 500 MG: 500 CAPSULE ORAL at 00:08

## 2023-04-25 RX ADMIN — ASPIRIN 81 MG: 81 TABLET, COATED ORAL at 08:08

## 2023-04-25 RX ADMIN — PANTOPRAZOLE SODIUM 40 MG: 40 TABLET, DELAYED RELEASE ORAL at 05:49

## 2023-04-25 RX ADMIN — CEPHALEXIN 500 MG: 500 CAPSULE ORAL at 12:06

## 2023-04-25 RX ADMIN — ATORVASTATIN CALCIUM 80 MG: 80 TABLET, FILM COATED ORAL at 17:19

## 2023-04-25 RX ADMIN — INSULIN LISPRO 2 UNITS: 100 INJECTION, SOLUTION INTRAVENOUS; SUBCUTANEOUS at 21:11

## 2023-04-25 RX ADMIN — CEPHALEXIN 500 MG: 500 CAPSULE ORAL at 23:52

## 2023-04-25 RX ADMIN — TAMSULOSIN HYDROCHLORIDE 0.4 MG: 0.4 CAPSULE ORAL at 16:19

## 2023-04-25 RX ADMIN — CEPHALEXIN 500 MG: 500 CAPSULE ORAL at 17:19

## 2023-04-25 RX ADMIN — INSULIN GLARGINE 40 UNITS: 100 INJECTION, SOLUTION SUBCUTANEOUS at 21:11

## 2023-04-25 RX ADMIN — INSULIN LISPRO 2 UNITS: 100 INJECTION, SOLUTION INTRAVENOUS; SUBCUTANEOUS at 12:06

## 2023-04-25 RX ADMIN — FUROSEMIDE 40 MG: 40 TABLET ORAL at 08:08

## 2023-04-25 RX ADMIN — CEPHALEXIN 500 MG: 500 CAPSULE ORAL at 05:49

## 2023-04-25 RX ADMIN — FUROSEMIDE 40 MG: 40 TABLET ORAL at 17:19

## 2023-04-25 RX ADMIN — CLOPIDOGREL BISULFATE 75 MG: 75 TABLET ORAL at 08:08

## 2023-04-25 NOTE — ASSESSMENT & PLAN NOTE
· Patient has a history of peripheral arterial disease  · History of previous left BKA in 2018  · Right lower extremity peripheral arterial disease:  · LEADS with right 50-70% proximal popliteal stenosis  · Patient was evaluated by the vascular surgery team  · Continue aspirin, Plavix, statin  · No current vascular intervention indicated: Vascular surgery notes patient is at high risk of limb loss given extent of injury at his right ankle    Currently has adequate healing potential based on his leads  · Outpatient follow-up

## 2023-04-25 NOTE — ASSESSMENT & PLAN NOTE
Presents with RLE foot wound, compression stocking tourniquet around R ankle with injury down to exposed tendon, cellulitic changes     Pt was followed by the vascular surgery and podiatry teams   Initial concern for osteomyelitis versus cellulitis   MRI showing diffuse subcutaneous edema without definitive osteomyelitis   Wound cx growing GNRs pantoea species susceptible to cephalosporin   LEAD study shows diffuse disease of right lower extremity, but per vascular surgery has adequate flow for healing potential   no vascular intervention at this time, patient with high risk of limb loss in the future   Podiatry recommending local wound care, will need close outpatient follow up   Transitioned IV abx to keflex to complete 10 day total course (day #8/10)

## 2023-04-25 NOTE — PROGRESS NOTES
"Progress Note - Podiatry  [facility] 60 y o  male MRN: [id number]  Unit/Bed#: E5 -01 Encounter: [id number]    Assessment  1  Right ankle wound with cellulitis  2  Right great toe wound secondary to injury  3  H/o left BKA  4  PAD  5  Uncontrolled Type 2 Diabetes  6  RLE lymphedema     Plan:  1  Patient wound is stable  He initially wasn't agreeable to rehab but I spoke to him  He is unable to care for himself and his wife is also unable to help him given his size and handicap  HE seems to be more open to getting rehab after our conversation  2  The wound dressings are in his chart  He should follow up in the wound center next week  3  HE rarely elevates the limb  I told him he needs to make time throughout the day to lay in bed with his leg on pillows    Subjective/Objective   Chief Complaint:   Chief Complaint   Patient presents with    Toe Injury     Pt c/o injuring R great toe this afternoon  Pt reports nail has been bleeding  Swelling noted to pts entire R leg that pt reports has been present for 2 years  Pt reports he is unsure of injury to toe because he has no feeling in R leg due to neuropathy and L leg is Prosthetic  Hx of DM, CHF       Subjective: 60 y o  y/o male seen and evaluated at bedside  No acute events overnight  Denies N/F/V/SOB/CP/cough/diarrhea/constipation  Blood pressure 116/58, pulse 64, temperature (!) 97 4 °F (36 3 °C), temperature source Temporal, resp  rate 18, height 6' 1\" (1 854 m), weight 126 kg (277 lb 5 4 oz), SpO2 94 %  ,Body mass index is 36 59 kg/m²      Lab Results   Component Value Date    WBC 9 03 04/24/2023    HGB 12 4 04/24/2023    HCT 39 0 04/24/2023    MCV 87 04/24/2023     04/24/2023     Lab Results   Component Value Date    GLUCOSE 165 (H) 03/28/2018    CALCIUM 8 9 04/24/2023     09/13/2016    K 4 4 04/24/2023    CO2 33 (H) 04/24/2023    CL 95 (L) 04/24/2023    BUN 19 04/24/2023    CREATININE 0 94 04/24/2023         Invasive Devices     " "None                 Physical Exam:   General: alert, cooperative and no distress  Vascular: severe lymphedema  Cellulitis resolved  Dermatology: partial detachment right great toenail  Severe lymphedema dermal thickening RLE  Wound stable, no cellulitis  Moderate serous drainage  Neurological: LOPS  MSK: LEft limb is amputated  Lab, Imaging and other studies:                           Imaging: I have personally reviewed pertinent films in PACS          Portions of the record may have been created with voice recognition software  Occasional wrong word or \"sound a like\" substitutions may have occurred due to the inherent limitations of voice recognition software  Read the chart carefully and recognize, using context, where substitutions have occurred      "

## 2023-04-25 NOTE — ASSESSMENT & PLAN NOTE
Continue PPI once daily  cont reglan 5mg TID PRN with meals  Recommend small frequent meals  Pt tolerating po

## 2023-04-25 NOTE — PHYSICAL THERAPY NOTE
PHYSICAL THERAPY NOTE          Patient Name: Sangita STEWART Date: 4/25/2023  11:25-12:04       04/25/23 1125   PT Last Visit   PT Visit Date 04/25/23   Note Type   Note Type Treatment for insurance authorization   Pain Assessment   Pain Score No Pain   Restrictions/Precautions   RLE Weight Bearing Per Order WBAT   Braces or Orthoses Other (Comment)  (surgical shoe-obtained new [employer] surgical shoe)   Other Precautions Fall Risk;Multiple lines   General   Chart Reviewed Yes   Response to Previous Treatment Patient with no complaints from previous session  Family/Caregiver Present No   Cognition   Overall Cognitive Status WFL   Arousal/Participation Cooperative   Attention Within functional limits   Orientation Level Oriented X4   Following Commands Follows one step commands without difficulty   Comments tangential, redirection to tasks needed  Subjective   Subjective Wants to get stronger at rehab and then go home  Bed Mobility   Additional Comments received OOB sitting in WC  Applied new surgical shoe to R foot  Transfers   Sit to Stand 4  Minimal assistance   Additional items Assist x 1; Increased time required;Verbal cues;Armrests  (cues for technique)   Stand to Sit 4  Minimal assistance   Additional items Assist x 1; Increased time required;Verbal cues  (cues for hand placement )   Toilet transfer 3  Moderate assistance   Additional items Assist x 1; Increased time required;Verbal cues;Standard toilet; Other  (grab bars L)   Additional Comments Increased time to complete transitions  Cues for hand placement  Performed x 4 during session  Ambulation/Elevation   Gait pattern Improper Weight shift;Decreased foot clearance; Inconsistent gerard; Short stride; Excessively slow; Steppage; Wide ROBBY   Gait Assistance 4  Minimal assist   Additional items Assist x 1;Verbal cues; Tactile cues; Other (Comment)  (chair follow for safety) Assistive Device Rolling walker   Distance 25 ft x 1, seated rest, 15'x1, seated rest, then 25'x1  BP monitored  See endurance section below for vitals  Balance   Static Sitting Good   Dynamic Sitting Fair +   Static Standing Fair -   Dynamic Standing Poor +   Ambulatory Poor +   Endurance Deficit   Endurance Deficit Yes   Endurance Deficit Description Fluctuating BP: following initial ambulation, 89/44, seated p rest 147/60  Second ambulation /72 and standing 109/50   Activity Tolerance   Activity Tolerance Patient limited by fatigue;Treatment limited secondary to medical complications (Comment)  (fluctuations in BP)   Medical Staff Made Aware OT-Irina   Nurse Made Aware yes   Exercises   Knee AROM Long Arc Quad Sitting;15 reps;AROM; Bilateral   Marching Sitting;15 reps;AROM; Bilateral   Neuro re-ed education on seated ex prior to upright mobility to assist with BP fluctuations  Paced transitions  Assessment   Prognosis Fair   Problem List Decreased strength;Decreased range of motion;Decreased endurance; Impaired balance;Decreased mobility;Obesity; Decreased skin integrity; Impaired sensation   Assessment Seen for progression of PT  Improved from previous  Less assistance for transfers  Javid for sit to stand from [facility] and chair, modA from toilet  Requires cues for hand placement  Increased time to complete transitions  Able to progress with ambulation distances and less assistance, however chair follow   + dizziness with ambulation reported  BP p ambulation 89/44 on initial ambulation  Returns to 147/60 upon seated  Subsequent transitions p ambulation 109/72 and 109/50  Tolerated progression of distances with subsequent ambulation trials  Continues to require skilled PT in order to facilitate return to independence and PLOF  The patient's AM-PAC Basic Mobility Inpatient Short Form Raw Score is 15   A Raw score of less than or equal to 16 suggests the patient may benefit from discharge to post-acute rehabilitation services  Please also refer to the recommendation of the Physical Therapist for safe discharge planning  Continue to recommend STR at d/c    Barriers to Discharge Inaccessible home environment;Decreased caregiver support   Goals   Patient Goals get stronger at rehab and then go home   STG Expiration Date 05/03/23   PT Treatment Day 2   Plan   Treatment/Interventions Functional transfer training;LE strengthening/ROM; Elevations; Therapeutic exercise; Endurance training;Patient/family training;Equipment eval/education; Bed mobility;Gait training;Continued evaluation; Compensatory technique education;Spoke to nursing;OT  (WC mobility)   Progress Progressing toward goals   PT Frequency 3-5x/wk   Recommendation   PT Discharge Recommendation Post acute rehabilitation services   Equipment Recommended Walker; Wheelchair  (pt has)   [address] in Flat Bed Without Bedrails 3   Lying on Back to Sitting on Edge of Flat Bed Without Bedrails 3   Moving Bed to Chair 3   Standing Up From Chair Using Arms 2   Walk in Room 3   Climb 3-5 Stairs With Railing 1   Basic Mobility Inpatient Raw Score 15   Basic Mobility Standardized Score 36 97   Highest Level Of Mobility   JH-HLM Goal 4: Move to chair/commode   JH-HLM Achieved 7: Walk 25 feet or more   Education   Education Provided Mobility training;Assistive device;Home exercise program   Patient Demonstrates acceptance/verbal understanding   End of Consult   Patient Position at End of Consult Bedside chair; All needs within reach   End of Consult Comments /50   Papi Melendez, PT

## 2023-04-25 NOTE — CASE MANAGEMENT
Received message from [facility] @ [facility] ([phone]) stating [facility] is OON and pt has HMO plan which will not allow for member to utilize [place] [address] facility  Deadline of 4/26 @ 11:30 to change facility to prevent denial of services   CM notified

## 2023-04-25 NOTE — OCCUPATIONAL THERAPY NOTE
Occupational Therapy Progress Note     Patient Name: Judd Osgood  Today's Date: 4/25/2023  Problem List  Principal Problem:    Cellulitis of right ankle  Active Problems:    Hyperlipidemia    PAD (peripheral artery disease) (HCC)    Diabetic gastroparesis (HCC)    Class 2 severe obesity due to excess calories with serious comorbidity and body mass index (BMI) of 35 0 to 35 9 in [facility])    Chronic diastolic heart failure ([facility])    Diabetic autonomic neuropathy associated with type 2 diabetes mellitus ([facility] [address] )    CAD (coronary artery disease)    Fracture, toe            04/25/23 1204   OT Last Visit   OT Visit Date 04/25/23   Note Type   Note Type Treatment for insurance authorization   Pain Assessment   Pain Assessment Tool 0-10   Pain Score No Pain   Restrictions/Precautions   Weight Bearing Precautions Per Order Yes   RLE Weight Bearing Per Order WBAT   Braces or Orthoses   (surgical shoe on R LE)   Other Precautions Fall Risk;Multiple lines   ADL   Where Assessed Wheelchair   Grooming Assistance 5  Supervision/Setup   Grooming Deficit Setup;Verbal cueing;Supervision/safety; Increased time to complete;Wash/dry hands; Wash/dry face   Grooming Comments cues for safety and positioning   UB Dressing Assistance 5  Supervision/Setup   UB Dressing Deficit Setup; Increased time to complete;Supervision/safety   LB Dressing Assistance 1  Total Assistance   LB Dressing Deficit Setup;Steadying; Requires assistive device for steadying;Verbal cueing;Supervision/safety; Increased time to complete; Don/doff R shoe   LB Dressing Comments impaired functional reach don/doff surgical shoe   Toileting Assistance  2  Maximal Assistance   Toileting Deficit Setup;Steadying;Verbal cueing;Supervison/safety; Increased time to complete;Clothing management down;Clothing management up   Toileting Comments impaired functional reach   Transfers   Sit to Stand 4  Minimal assistance   Additional items Assist x 1; Increased time required;Verbal cues;Armrests   Stand to Sit 4  Minimal assistance   Additional items Assist x 1; Increased time required;Verbal cues;Armrests   Toilet transfer 3  Moderate assistance   Additional items Assist x 1; Increased time required;Verbal cues;Standard toilet  (grab bar left)   Additional Comments cues for hand placement and positioning; BP: 89/44 after activity; 147/60 then after transfer to [facility]: 109/72 then standin/50   Functional Mobility   Functional Mobility 4  Minimal assistance   Additional Comments assist x1 w/ increased time to complete RW; w/c mobility supervision   Additional items Rolling walker   Therapeutic Exercise - ROM   UE-ROM Yes   ROM- Right Upper Extremities   R Shoulder AROM; Flexion; Extension;Horizontal ABduction   R Elbow AROM;Elbow flexion;Elbow extension  (forearm pronation/supination)   R Weight/Reps/Sets 2 sets x 15 reps   RUE ROM Comment tolerated well, educated to continue t/o   ROM - Left Upper Extremities    L Shoulder AROM; Flexion; Extension;Horizontal ABduction   L Elbow AROM;Elbow flexion;Elbow extension  (forearm pronation/supination, punchouts)   L Weight/Reps/Sets 2 sets x 15 reps   LUE ROM Comment tolerated well, educated to continue t/o   Cognition   Overall Cognitive Status WFL   Arousal/Participation Alert; Cooperative;Responsive   Attention Within functional limits   Orientation Level Oriented X4   Memory Within functional limits   Following Commands Follows one step commands without difficulty   Comments engages in appropriate conversations, pleasant and cooperative   Additional Activities   Additional Activities   (education on positioning)   Additional Activities Comments pt receptive   Activity Tolerance   Activity Tolerance Patient limited by fatigue;Patient limited by pain;Treatment limited secondary to medical complications (Comment)   Medical Staff Made Aware appropriate to see per Shamika RICHMOND   Assessment   Assessment Pt seen for skilled OT session focused on ADLs, functional transfers and mobility and positioning  Pt seated in w/c upon arrival  Pt w/ setup grooming tasks and UB ADLs  Pt w/ MAX-total assist LB ADLs 2* impaired functional reach and balance  Pt w/ improvement in functional transfers to min assist x1 from w/c w/ cues for positioning and pt w/ orthostatic hypotension (seen above)  Pt w/ MIN assist x1 functional mobility w/ RW to bathroom and mod assist transfers on/off toilet w/ grab bar use and cues for positioning and safety  Pt w/ MAX assist toileting hygiene  Pt educated on safety at home w/ bathroom setup and utilizing BSC over toilet to increase ease  Pt w/ improvement in functional transfers and mobility since last session  Pt continues to be limited due to increased pain, impaired balance, impaired functional reach, impaired activity tolerance, fall risk, decreased safety awareness  Recommend STR when medically stable  Will continue to follow  The patient's raw score on the AM-PAC Daily Activity Inpatient Short Form is 15  A raw score of less than 19 suggests the patient may benefit from discharge to post-acute rehabilitation services  Please refer to the recommendation of the Occupational Therapist for safe discharge planning  Plan   Treatment Interventions ADL retraining;UE strengthening/ROM; Functional transfer training;Cognitive reorientation; Endurance training;Patient/family training;Equipment evaluation/education; Compensatory technique education; Activityengagement; Energy conservation   Goal Expiration Date 05/03/23   OT Treatment Day 2   OT Frequency 3-5x/wk   Recommendation   OT Discharge Recommendation Post acute rehabilitation services   [facility]   Lower Body Dressing 2   Bathing 2   Toileting 1   Upper Body Dressing 3   Grooming 3   Eating 4   Daily Activity Raw Score 15   Daily Activity Standardized Score (Calc for Raw Score >=11) 34 69   AM-[facility] Applied Cognition Inpatient   Following a Speech/Presentation 4 Understanding Ordinary Conversation 4   Taking Medications 3   Remembering Where Things Are Placed or Put Away 3   Remembering List of 4-5 Errands 3   Taking Care of Complicated Tasks 3   Applied Cognition Raw Score 20   Applied Cognition Standardized Score 41 76   Modified [place] Scale   Modified [place] Scale 4   End of Consult   Education Provided Yes   Patient Position at End of Consult Bed/Chair alarm activated; All needs within reach; Bedside chair   Nurse Communication Nurse aware of consult     Documentation completed by: Rozina Norris MS, OTR/L

## 2023-04-25 NOTE — PROGRESS NOTES
[facility]  Progress Note  Name: Young Sen  MRN: [id number]  Unit/Bed#: E5 -01 I Date of Admission: 4/17/2023   Date of Service: 4/25/2023 I Hospital Day: 8    Assessment/Plan   * Cellulitis of right ankle  Assessment & Plan  Presents with RLE foot wound, compression stocking tourniquet around R ankle with injury down to exposed tendon, cellulitic changes     Pt was followed by the vascular surgery and podiatry teams   Initial concern for osteomyelitis versus cellulitis   MRI showing diffuse subcutaneous edema without definitive osteomyelitis   Wound cx growing GNRs pantoea species susceptible to cephalosporin   LEAD study shows diffuse disease of right lower extremity, but per vascular surgery has adequate flow for healing potential   no vascular intervention at this time, patient with high risk of limb loss in the future   Podiatry recommending local wound care, will need close outpatient follow up   Transitioned IV abx to keflex to complete 10 day total course (day #8/10)    Fracture, toe  Assessment & Plan  · Patient suffered an injury to his right first toe when he \"stubbed it\"  · X-ray revealed \"nondisplaced fracture, terminal tuft of the right 1st toe  \"  · Patient was eval to podiatry team: No current intervention required    Continue wound care with Betadine    CAD (coronary artery disease)  Assessment & Plan  · Patient has coronary artery disease with prior CABG   · continue aspirin, statin, plavix  · No evidence of acute ischemia  · Continue outpatient follow-up    Diabetic autonomic neuropathy associated with type 2 diabetes [facility] )  Assessment & Plan  Lab Results   Component Value Date    HGBA1C 6 7 (A) 02/02/2023     · Patient has diabetes type 2, with long-term use of insulin, with associated peripheral arterial disease and neuropathy   · Continue modified insulin regimen   · Home regimen was:  80 units BID  · Due to hypoglycemia his current insulin " regimen is Lantus 40 units every 12 hours  · Continue sliding scale, algorithm 4  · Decrease lantus 40 units from 50 BID  Morning    · improved glycemic control    Chronic diastolic heart failure (HCC)  Assessment & Plan  Wt Readings from Last 3 Encounters:   04/25/23 126 kg (277 lb 1 9 oz)   09/09/21 127 kg (280 lb)   06/09/21 127 kg (280 lb)     · Pt with h/o chronic diastolic chf  · Most recent 2D echo 2/2020: Normal left ventricular ejection fraction  Grade 1 diastolic dysfunction  · Clinically euvolemic   Continue lasix 40mg bid    Class 2 severe obesity due to excess calories with serious comorbidity and body mass index (BMI) of 35 0 to 35 9 in adult [facility])  Assessment & Plan  BMI 36  Dietary counseling, lifestyle modification    Diabetic gastroparesis (HCC)  Assessment & Plan  Continue PPI once daily  cont reglan 5mg TID PRN with meals  Recommend small frequent meals  Pt tolerating po    PAD (peripheral artery disease) ([facility] [address] )  Assessment & Plan  · Patient has a history of peripheral arterial disease  · History of previous left BKA in 2018  · Right lower extremity peripheral arterial disease:  · LEADS with right 50-70% proximal popliteal stenosis  · Patient was evaluated by the vascular surgery team  · Continue aspirin, Plavix, statin  · No current vascular intervention indicated: Vascular surgery notes patient is at high risk of limb loss given extent of injury at his right ankle    Currently has adequate healing potential based on his leads  · Outpatient follow-up    Hyperlipidemia  Assessment & Plan  Continue statin               Family:  Updated wife via phone    renee pts nurse  renee :  Medically ready for dc to STR when bed and auth available    VTE Pharmacologic Prophylaxis: Enoxaparin (Lovenox)  VTE Mechanical Prophylaxis: sequential compression device        Certification Statement: The patient will continue to require additional inpatient hospital stay due to need for further acute intervention for dc planning    Status: inpatient     =======================================================    Subjective:  Pt notes he feels well  Denies any pain  Denies any foot pain  Denies any abd pain, n/v/d  Tolerating po  Notes no BM but declines lax  He denies any shortness of breath or cough  Denies any dizziness or lightheadedness  Physical Exam:   Temp:  [97 4 °F (36 3 °C)-97 6 °F (36 4 °C)] 97 6 °F (36 4 °C)  HR:  [64-67] 67  Resp:  [18] 18  BP: ()/(44-72) 142/60    Gen:  Pleasant, non-tachypnic, non-dyspnic  Conversant  Sitting up in a chair at the bedside  Heart: regular rate and rhythm, S1S2 present, no murmur, rub or gallop  Lungs: clear to ausculatation bilaterally  No wheezing, crackles, or rhonchi  No accessory muscle use or respiratory distress  Abd: soft, non-tender, non-distended  NABS, no guarding, rebound or peritoneal signs  Extremities: Right lower extremity dressing in place  Left BKA noted  Neuro: awake, alert  Fluent speech  Interactive  Skin: Right lower extremity dressing in place    LABS:   Results from last 7 days   Lab Units 04/24/23  0807 04/21/23  0528 04/19/23  0450   WBC Thousand/uL 9 03 8 22 8 89   HEMOGLOBIN g/dL 12 4 12 1 12 2   HEMATOCRIT % 39 0 37 5 38 5   PLATELETS Thousands/uL 279 267 265     Results from last 7 days   Lab Units 04/24/23  0807 04/21/23  0528 04/19/23  0450   POTASSIUM mmol/L 4 4 3 8 3 9   CHLORIDE mmol/L 95* 97 97   CO2 mmol/L 33* 29 30   BUN mg/dL 19 16 15   CREATININE mg/dL 0 94 0 96 1 00   CALCIUM mg/dL 8 9 8 7 8 8       Hospital Data:    4/18 MRI right ankle/heel  Severe subcutaneous edema with ulceration seen medially as above    There is mild marrow edema along the medial malleolus, nonspecific with differential considerations as above   No confluent decreased T1 marrow signal to definitively suggest osteomyelitis at this time      4/18 LE ADS  RIGHT LOWER LIMB:  There is a 50-75% stenosis in the proximal popliteal artery, and diffuse  atherosclerotic arterial disease throughout the remaining portions of the  femoropopliteal vessels  Unable to visualize the calf vessels due to  depth/edema/induration  Ankle/Brachial index: Unable to obtain due to ulcer/bandaging and induration  (Prior0  95, normal range )  Metatarsal pressure of 123 mm Hg  2nd digit pressure of 73 mm Hg, within the healing range  PVR/ PPG tracings are dampened      LEFT LOWER LIMB:  BKA  Compared to previous study on 8/12/2019, there is no significant change in  disease  Recommend repeat duplex in 1 year to monitor for plaque progression     4/17 right foot x-ra  Nondisplaced fracture, terminal tuft of the right 1st toe       4/17 right ankle x-ray  No acute osseous abnormality     Microbiology:  4/17: Wound culture: 2+ Pantoea septica Pantoea species        ---------------------------------------------------------------------------------------------------------------  This note has been constructed using a voice recognition system

## 2023-04-25 NOTE — PLAN OF CARE
Problem: OCCUPATIONAL THERAPY ADULT  Goal: Performs self-care activities at highest level of function for planned discharge setting  See evaluation for individualized goals  Description: Treatment Interventions: ADL retraining, UE strengthening/ROM, Functional transfer training, Endurance training, Cognitive reorientation, Patient/family training, Equipment evaluation/education, Compensatory technique education, Energy conservation, Activityengagement          See flowsheet documentation for full assessment, interventions and recommendations  Outcome: Progressing  Note: Limitation: Decreased ADL status, Decreased UE strength, Decreased UE ROM, Decreased Safe judgement during ADL, Decreased sensation, Decreased endurance, Decreased self-care trans, Decreased high-level ADLs  Prognosis: Fair  Assessment: Pt seen for skilled OT session focused on ADLs, functional transfers and mobility and positioning  Pt seated in w/c upon arrival  Pt w/ setup grooming tasks and UB ADLs  Pt w/ MAX-total assist LB ADLs 2* impaired functional reach and balance  Pt w/ improvement in functional transfers to min assist x1 from w/c w/ cues for positioning and pt w/ orthostatic hypotension (seen above)  Pt w/ MIN assist x1 functional mobility w/ RW to bathroom and mod assist transfers on/off toilet w/ grab bar use and cues for positioning and safety  Pt w/ MAX assist toileting hygiene  Pt educated on safety at home w/ bathroom setup and utilizing BSC over toilet to increase ease  Pt w/ improvement in functional transfers and mobility since last session  Pt continues to be limited due to increased pain, impaired balance, impaired functional reach, impaired activity tolerance, fall risk, decreased safety awareness  Recommend STR when medically stable  Will continue to follow  The patient's raw score on the AM-PAC Daily Activity Inpatient Short Form is 15   A raw score of less than 19 suggests the patient may benefit from discharge to post-acute rehabilitation services  Please refer to the recommendation of the Occupational Therapist for safe discharge planning       OT Discharge Recommendation: Post acute rehabilitation services

## 2023-04-25 NOTE — ASSESSMENT & PLAN NOTE
Wt Readings from Last 3 Encounters:   04/25/23 126 kg (277 lb 1 9 oz)   09/09/21 127 kg (280 lb)   06/09/21 127 kg (280 lb)     · Pt with h/o chronic diastolic chf  · Most recent 2D echo 2/2020: Normal left ventricular ejection fraction  Grade 1 diastolic dysfunction    · Clinically euvolemic   Continue lasix 40mg bid

## 2023-04-25 NOTE — PLAN OF CARE
Problem: PHYSICAL THERAPY ADULT  Goal: Performs mobility at highest level of function for planned discharge setting  See evaluation for individualized goals  Description: Treatment/Interventions: Functional transfer training, LE strengthening/ROM, Elevations, Therapeutic exercise, Endurance training, Patient/family training, Bed mobility, Gait training, Spoke to nursing, OT  Equipment Recommended: Araceli Pizano, Wheelchair (pt has)       See flowsheet documentation for full assessment, interventions and recommendations  Outcome: Progressing  Note: Prognosis: Fair  Problem List: Decreased strength, Decreased range of motion, Decreased endurance, Impaired balance, Decreased mobility, Obesity, Decreased skin integrity, Impaired sensation  Assessment: Seen for progression of PT  Improved from previous  Less assistance for transfers  Jaivd for sit to stand from [facility] and chair, modA from toilet  Requires cues for hand placement  Increased time to complete transitions  Able to progress with ambulation distances and less assistance, however chair follow   + dizziness with ambulation reported  BP p ambulation 89/44 on initial ambulation  Returns to 147/60 upon seated  Subsequent transitions p ambulation 109/72 and 109/50  Tolerated progression of distances with subsequent ambulation trials  Continues to require skilled PT in order to facilitate return to independence and PLOF  The patient's AM-[facility] Basic Mobility Inpatient Short Form Raw Score is 15  A Raw score of less than or equal to 16 suggests the patient may benefit from discharge to post-acute rehabilitation services  Please also refer to the recommendation of the Physical Therapist for safe discharge planning   Continue to recommend STR at d/c   Barriers to Discharge: Inaccessible home environment, Decreased caregiver support  Barriers to Discharge Comments: VITOR; home alone during the day  PT Discharge Recommendation: Post acute rehabilitation services    See flowsheet documentation for full assessment

## 2023-04-25 NOTE — ASSESSMENT & PLAN NOTE
Lab Results   Component Value Date    HGBA1C 6 7 (A) 02/02/2023     · Patient has diabetes type 2, with long-term use of insulin, with associated peripheral arterial disease and neuropathy   · Continue modified insulin regimen   · Home regimen was:  80 units BID  · Due to hypoglycemia his current insulin regimen is Lantus 40 units every 12 hours  · Continue sliding scale, algorithm 4  · Decrease lantus 40 units from 50 BID   Morning    · improved glycemic control

## 2023-04-25 NOTE — CASE MANAGEMENT
[facility] [address] 49 received request for authorization from Care Manager  Authorization request for: SNF  Facility Name: [facility], [facility]  NPI: [id number]  Facility MD: Jovany Baez  NPI: [id number]  Authorization initiated by contacting insurance: Aquiles Callejas Via: Phone  Pending Reference #: [id number]  Clinicals submitted via:  Fax

## 2023-04-25 NOTE — PLAN OF CARE
Problem: MOBILITY - ADULT  Goal: Maintain or return to baseline ADL function  Description: INTERVENTIONS:  -  Assess patient's ability to carry out ADLs; assess patient's baseline for ADL function and identify physical deficits which impact ability to perform ADLs (bathing, care of mouth/teeth, toileting, grooming, dressing, etc )  - Assess/evaluate cause of self-care deficits   - Assess range of motion  - Assess patient's mobility; develop plan if impaired  - Assess patient's need for assistive devices and provide as appropriate  - Encourage maximum independence but intervene and supervise when necessary  - Involve family in performance of ADLs  - Assess for home care needs following discharge   - Consider OT consult to assist with ADL evaluation and planning for discharge  - Provide patient education as appropriate  4/25/2023 0713 by Rebecca Steel RN  Outcome: Progressing  4/25/2023 0713 by Rebecca Steel RN  Outcome: Adequate for Discharge  4/25/2023 3575 by Rebecca Steel RN  Outcome: Adequate for Discharge  Goal: Maintains/Returns to pre admission functional level  Description: INTERVENTIONS:  - Perform BMAT or MOVE assessment daily    - Set and communicate daily mobility goal to care team and patient/family/caregiver     - Collaborate with rehabilitation services on mobility goals if consulted  - Out of bed for toileting  - Record patient progress and toleration of activity level   4/25/2023 0713 by Rebecca Steel RN  Outcome: Progressing  4/25/2023 0713 by Rebecca Steel RN  Outcome: Adequate for Discharge  4/25/2023 1962 by Rebecca Steel RN  Outcome: Adequate for Discharge     Problem: PAIN - ADULT  Goal: Verbalizes/displays adequate comfort level or baseline comfort level  Description: Interventions:  - Encourage patient to monitor pain and request assistance  - Assess pain using appropriate pain scale  - Administer analgesics based on type and severity of pain and evaluate response  - Implement non-pharmacological measures as appropriate and evaluate response  - Consider cultural and social influences on pain and pain management  - Notify physician/advanced practitioner if interventions unsuccessful or patient reports new pain  4/25/2023 0713 by Chari Fraga RN  Outcome: Progressing  4/25/2023 0713 by Chari Fraga RN  Outcome: Adequate for Discharge  4/25/2023 3875 by Chari Fraga RN  Outcome: Adequate for Discharge     Problem: INFECTION - ADULT  Goal: Absence or prevention of progression during hospitalization  Description: INTERVENTIONS:  - Assess and monitor for signs and symptoms of infection  - Monitor lab/diagnostic results  - Monitor all insertion sites, i e  indwelling lines, tubes, and drains  - Monitor endotracheal if appropriate and nasal secretions for changes in amount and color  - [place] appropriate cooling/warming therapies per order  - Administer medications as ordered  - Instruct and encourage patient and family to use good hand hygiene technique  - Identify and instruct in appropriate isolation precautions for identified infection/condition  4/25/2023 0713 by Chari Fraga RN  Outcome: Progressing  4/25/2023 0713 by Chari Fraga RN  Outcome: Adequate for Discharge  4/25/2023 5366 by Chari Fraga RN  Outcome: Adequate for Discharge     Problem: DISCHARGE PLANNING  Goal: Discharge to home or other facility with appropriate resources  Description: INTERVENTIONS:  - Identify barriers to discharge w/patient and caregiver  - Arrange for needed discharge resources and transportation as appropriate  - Identify discharge learning needs (meds, wound care, etc )  - Arrange for interpretive services to assist at discharge as needed  - Refer to Case Management Department for coordinating discharge planning if the patient needs post-hospital services based on physician/advanced practitioner order or complex needs related to functional status, cognitive ability, or social support system  4/25/2023 0713 by Chari Fraga RN  Outcome: Progressing  4/25/2023 0713 by Chapincito Padilla RN  Outcome: Adequate for Discharge  4/25/2023 6994 by Chapincito Padilla RN  Outcome: Adequate for Discharge     Problem: Prexisting or High Potential for Compromised Skin Integrity  Goal: Skin integrity is maintained or improved  Description: INTERVENTIONS:  - Identify patients at risk for skin breakdown  - Assess and monitor skin integrity  - Assess and monitor nutrition and hydration status  - Monitor labs   - Assess for incontinence   - Turn and reposition patient  - Assist with mobility/ambulation  - Relieve pressure over bony prominences  - Avoid friction and shearing  - Provide appropriate hygiene as needed including keeping skin clean and dry  - Evaluate need for skin moisturizer/barrier cream  - Collaborate with interdisciplinary team   - Patient/family teaching  - Consider wound care consult   4/25/2023 0713 by Chapincito Padilla RN  Outcome: Progressing  4/25/2023 0713 by Chapincito Padilla RN  Outcome: Adequate for Discharge  4/25/2023 0835 by Chapincito Padilla RN  Outcome: Adequate for Discharge

## 2023-04-25 NOTE — PLAN OF CARE
Problem: MOBILITY - ADULT  Goal: Maintain or return to baseline ADL function  Description: INTERVENTIONS:  -  Assess patient's ability to carry out ADLs; assess patient's baseline for ADL function and identify physical deficits which impact ability to perform ADLs (bathing, care of mouth/teeth, toileting, grooming, dressing, etc )  - Assess/evaluate cause of self-care deficits   - Assess range of motion  - Assess patient's mobility; develop plan if impaired  - Assess patient's need for assistive devices and provide as appropriate  - Encourage maximum independence but intervene and supervise when necessary  - Involve family in performance of ADLs  - Assess for home care needs following discharge   - Consider OT consult to assist with ADL evaluation and planning for discharge  - Provide patient education as appropriate  Outcome: Progressing  Goal: Maintains/Returns to pre admission functional level  Description: INTERVENTIONS:  - Perform BMAT or MOVE assessment daily    - Set and communicate daily mobility goal to care team and patient/family/caregiver  - Collaborate with rehabilitation services on mobility goals if consulted  - Perform Range of Motion 3 times a day  - Reposition patient every 2 hours    - Dangle patient 3 times a day  - Stand patient 3 times a day  - Ambulate patient 3 times a day  - Out of bed to chair 3 times a day   - Out of bed for meals 3 times a day  - Out of bed for toileting  - Record patient progress and toleration of activity level   Outcome: Progressing     Problem: PAIN - ADULT  Goal: Verbalizes/displays adequate comfort level or baseline comfort level  Description: Interventions:  - Encourage patient to monitor pain and request assistance  - Assess pain using appropriate pain scale  - Administer analgesics based on type and severity of pain and evaluate response  - Implement non-pharmacological measures as appropriate and evaluate response  - Consider cultural and social influences on pain and pain management  - Notify physician/advanced practitioner if interventions unsuccessful or patient reports new pain  Outcome: Progressing     Problem: INFECTION - ADULT  Goal: Absence or prevention of progression during hospitalization  Description: INTERVENTIONS:  - Assess and monitor for signs and symptoms of infection  - Monitor lab/diagnostic results  - Monitor all insertion sites, i e  indwelling lines, tubes, and drains  - Monitor endotracheal if appropriate and nasal secretions for changes in amount and color  - [place] appropriate cooling/warming therapies per order  - Administer medications as ordered  - Instruct and encourage patient and family to use good hand hygiene technique  - Identify and instruct in appropriate isolation precautions for identified infection/condition  Outcome: Progressing     Problem: DISCHARGE PLANNING  Goal: Discharge to home or other facility with appropriate resources  Description: INTERVENTIONS:  - Identify barriers to discharge w/patient and caregiver  - Arrange for needed discharge resources and transportation as appropriate  - Identify discharge learning needs (meds, wound care, etc )  - Arrange for interpretive services to assist at discharge as needed  - Refer to Case Management Department for coordinating discharge planning if the patient needs post-hospital services based on physician/advanced practitioner order or complex needs related to functional status, cognitive ability, or social support system  Outcome: Progressing     Problem: Prexisting or High Potential for Compromised Skin Integrity  Goal: Skin integrity is maintained or improved  Description: INTERVENTIONS:  - Identify patients at risk for skin breakdown  - Assess and monitor skin integrity  - Assess and monitor nutrition and hydration status  - Monitor labs   - Assess for incontinence   - Turn and reposition patient  - Assist with mobility/ambulation  - Relieve pressure over bony prominences  - Avoid friction and shearing  - Provide appropriate hygiene as needed including keeping skin clean and dry  - Evaluate need for skin moisturizer/barrier cream  - Collaborate with interdisciplinary team   - Patient/family teaching  - Consider wound care consult   Outcome: Progressing

## 2023-04-26 LAB
GLUCOSE SERPL-MCNC: 157 MG/DL (ref 65–140)
GLUCOSE SERPL-MCNC: 174 MG/DL (ref 65–140)
GLUCOSE SERPL-MCNC: 196 MG/DL (ref 65–140)
GLUCOSE SERPL-MCNC: 209 MG/DL (ref 65–140)
GLUCOSE SERPL-MCNC: 218 MG/DL (ref 65–140)

## 2023-04-26 RX ADMIN — FUROSEMIDE 40 MG: 40 TABLET ORAL at 17:52

## 2023-04-26 RX ADMIN — TAMSULOSIN HYDROCHLORIDE 0.4 MG: 0.4 CAPSULE ORAL at 17:51

## 2023-04-26 RX ADMIN — CLOPIDOGREL BISULFATE 75 MG: 75 TABLET ORAL at 08:04

## 2023-04-26 RX ADMIN — INSULIN LISPRO 2 UNITS: 100 INJECTION, SOLUTION INTRAVENOUS; SUBCUTANEOUS at 08:06

## 2023-04-26 RX ADMIN — INSULIN LISPRO 2 UNITS: 100 INJECTION, SOLUTION INTRAVENOUS; SUBCUTANEOUS at 12:21

## 2023-04-26 RX ADMIN — CEPHALEXIN 500 MG: 500 CAPSULE ORAL at 12:21

## 2023-04-26 RX ADMIN — INSULIN GLARGINE 40 UNITS: 100 INJECTION, SOLUTION SUBCUTANEOUS at 21:12

## 2023-04-26 RX ADMIN — ASPIRIN 81 MG: 81 TABLET, COATED ORAL at 08:04

## 2023-04-26 RX ADMIN — CEPHALEXIN 500 MG: 500 CAPSULE ORAL at 23:17

## 2023-04-26 RX ADMIN — INSULIN LISPRO 2 UNITS: 100 INJECTION, SOLUTION INTRAVENOUS; SUBCUTANEOUS at 21:12

## 2023-04-26 RX ADMIN — FUROSEMIDE 40 MG: 40 TABLET ORAL at 08:04

## 2023-04-26 RX ADMIN — CEPHALEXIN 500 MG: 500 CAPSULE ORAL at 05:05

## 2023-04-26 RX ADMIN — INSULIN GLARGINE 40 UNITS: 100 INJECTION, SOLUTION SUBCUTANEOUS at 08:04

## 2023-04-26 RX ADMIN — POLYETHYLENE GLYCOL 3350 17 G: 17 POWDER, FOR SOLUTION ORAL at 08:04

## 2023-04-26 RX ADMIN — PANTOPRAZOLE SODIUM 40 MG: 40 TABLET, DELAYED RELEASE ORAL at 05:05

## 2023-04-26 RX ADMIN — FINASTERIDE 5 MG: 5 TABLET, FILM COATED ORAL at 08:04

## 2023-04-26 RX ADMIN — INSULIN LISPRO 4 UNITS: 100 INJECTION, SOLUTION INTRAVENOUS; SUBCUTANEOUS at 17:52

## 2023-04-26 RX ADMIN — ATORVASTATIN CALCIUM 80 MG: 80 TABLET, FILM COATED ORAL at 17:51

## 2023-04-26 RX ADMIN — CEPHALEXIN 500 MG: 500 CAPSULE ORAL at 17:51

## 2023-04-26 NOTE — ASSESSMENT & PLAN NOTE
Presents with RLE foot wound, compression stocking tourniquet around R ankle with injury down to exposed tendon, cellulitic changes     Pt was followed by the vascular surgery and podiatry teams   Initial concern for osteomyelitis versus cellulitis   MRI showing diffuse subcutaneous edema without definitive osteomyelitis   Wound cx growing GNRs pantoea species susceptible to cephalosporin   LEAD study shows diffuse disease of right lower extremity, but per vascular surgery has adequate flow for healing potential   no vascular intervention at this time, patient with high risk of limb loss in the future   Podiatry recommending local wound care, will need close outpatient follow up   Transitioned IV abx to keflex to complete 10 day total course (day #9/10)   Patient is medically ready for discharge: Awaiting rehab bed

## 2023-04-26 NOTE — ASSESSMENT & PLAN NOTE
Lab Results   Component Value Date    HGBA1C 6 7 (A) 02/02/2023     · Patient has diabetes type 2, with long-term use of insulin, with associated peripheral arterial disease and neuropathy   · Continue modified insulin regimen   · Home regimen was:  80 units BID  · Due to hypoglycemia his current insulin regimen is Lantus 40 units every 12 hours  · Continue sliding scale, algorithm 4  · Decrease lantus 40 units from 50 BID      · improved glycemic control  · Blood sugars today [phone]: Continue to monitor and titrate as needed

## 2023-04-26 NOTE — PLAN OF CARE
Problem: MOBILITY - ADULT  Goal: Maintain or return to baseline ADL function  Description: INTERVENTIONS:  -  Assess patient's ability to carry out ADLs; assess patient's baseline for ADL function and identify physical deficits which impact ability to perform ADLs (bathing, care of mouth/teeth, toileting, grooming, dressing, etc )  - Assess/evaluate cause of self-care deficits   - Assess range of motion  - Assess patient's mobility; develop plan if impaired  - Assess patient's need for assistive devices and provide as appropriate  - Encourage maximum independence but intervene and supervise when necessary  - Involve family in performance of ADLs  - Assess for home care needs following discharge   - Consider OT consult to assist with ADL evaluation and planning for discharge  - Provide patient education as appropriate  Outcome: Progressing  Goal: Maintains/Returns to pre admission functional level  Description: INTERVENTIONS:  - Perform BMAT or MOVE assessment daily    - Set and communicate daily mobility goal to care team and patient/family/caregiver     - Collaborate with rehabilitation services on mobility goals if consulted  - Out of bed for toileting  - Record patient progress and toleration of activity level   Outcome: Progressing     Problem: PAIN - ADULT  Goal: Verbalizes/displays adequate comfort level or baseline comfort level  Description: Interventions:  - Encourage patient to monitor pain and request assistance  - Assess pain using appropriate pain scale  - Administer analgesics based on type and severity of pain and evaluate response  - Implement non-pharmacological measures as appropriate and evaluate response  - Consider cultural and social influences on pain and pain management  - Notify physician/advanced practitioner if interventions unsuccessful or patient reports new pain  Outcome: Progressing     Problem: INFECTION - ADULT  Goal: Absence or prevention of progression during hospitalization  Description: INTERVENTIONS:  - Assess and monitor for signs and symptoms of infection  - Monitor lab/diagnostic results  - Monitor all insertion sites, i e  indwelling lines, tubes, and drains  - Monitor endotracheal if appropriate and nasal secretions for changes in amount and color  - [place] appropriate cooling/warming therapies per order  - Administer medications as ordered  - Instruct and encourage patient and family to use good hand hygiene technique  - Identify and instruct in appropriate isolation precautions for identified infection/condition  Outcome: Progressing     Problem: DISCHARGE PLANNING  Goal: Discharge to home or other facility with appropriate resources  Description: INTERVENTIONS:  - Identify barriers to discharge w/patient and caregiver  - Arrange for needed discharge resources and transportation as appropriate  - Identify discharge learning needs (meds, wound care, etc )  - Arrange for interpretive services to assist at discharge as needed  - Refer to Case Management Department for coordinating discharge planning if the patient needs post-hospital services based on physician/advanced practitioner order or complex needs related to functional status, cognitive ability, or social support system  Outcome: Progressing     Problem: Prexisting or High Potential for Compromised Skin Integrity  Goal: Skin integrity is maintained or improved  Description: INTERVENTIONS:  - Identify patients at risk for skin breakdown  - Assess and monitor skin integrity  - Assess and monitor nutrition and hydration status  - Monitor labs   - Assess for incontinence   - Turn and reposition patient  - Assist with mobility/ambulation  - Relieve pressure over bony prominences  - Avoid friction and shearing  - Provide appropriate hygiene as needed including keeping skin clean and dry  - Evaluate need for skin moisturizer/barrier cream  - Collaborate with interdisciplinary team   - Patient/family teaching  - Consider wound care consult   Outcome: Progressing

## 2023-04-26 NOTE — CASE MANAGEMENT
Case Management Discharge Planning Note    Patient name Joanna Barillas  Location [address] [address] /[address]5 [place] [phone] [id number]-* MRN [id number]  : 1960 Date 2023       Current Admission Date: 2023  Current Admission Diagnosis:Cellulitis of right ankle   Patient Active Problem List    Diagnosis Date Noted    Fracture, toe 2023    Cellulitis of right ankle 2023    Non-pressure chronic ulcer of right calf with fat layer exposed ([place] [address] ) 2023    Other acute osteomyelitis, left ankle and foot ([place] [address] ) 10/29/2020    Embolism and thrombosis of arteries of the lower extremities ([place] [address] ) 10/29/2020    Lymphedema of right lower extremity 10/29/2020    Venous stasis dermatitis of right lower extremity 10/29/2020    Abdominal distension 10/29/2020    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    Nodular rash 2020    CAD (coronary artery disease) 2019    Amputated below knee, left ([place] [address] ) 10/04/2018    Phantom limb syndrome without pain ([place] [address] ) 10/04/2018    Obstructive sleep apnea 2018    Hyponatremia 2018    Diabetic autonomic neuropathy associated with type 2 diabetes mellitus ([place] [address] ) 2018    S/P CABG x 3 2018    Other constipation 2018    Chronic diastolic heart failure ([place] [address] ) 2018    Hypertensive heart disease with congestive heart failure (HCC)     Triple vessel coronary artery disease 2018    Diabetic gastroparesis ([place] [address] ) 2018    Class 2 severe obesity due to excess calories with serious comorbidity and body mass index (BMI) of 35 0 to 35 9 in adult ([place] [address] ) 2018    Orthostatic hypotension 2018    PAD (peripheral artery disease) ([place] [address] ) 2018    S/P BKA (below knee amputation), left ([place] [address] ) 2018    Anxiety and depression 2017    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 2017    Vitamin D deficiency 2016    Hyperlipidemia 2015    Diabetic neuropathy, type II diabetes mellitus ([facility] [address] ) 11/06/2014      LOS (days): 9  Geometric Mean LOS (GMLOS) (days): 3 00  Days to GMLOS:-5 4     OBJECTIVE:  Risk of Unplanned Readmission Score: 10 52         Current admission status: Inpatient   Preferred Pharmacy:   [facility] [address]ZIA PEREIRA [address] [facility] [address] [facility] [address]  [facility] [address]  Phone: [phone] Fax: [phone]    [facility] ([address],   [address] [facility]  [facility]  [address] [facility] [address]  Phone: [phone] Fax: [phone]    [facility] ([facility] ) - [address],[facility]  [address]  Phone: [phone] Fax: [phone]    Primary Care Provider: Kristin Slater DO    Primary Insurance: Bran Bravo [facility] REP  Secondary Insurance:     DISCHARGE DETAILS:      Additional Comments: CM received notice from CM Discharge Support Team that Pt's insurance does not allow services with any [facility] provider, including rehabilitation factilies  CM informed Pt and Spouse of same  Slim also made aware  CM working w/ [employer] DC Support Team to identify an  IN-Network facility  Pt remains medically stable for discharge  CM to discuss w/ [facility] Liaison who just yesterday informed CM that Pt would be appropriate for ACUTE- unfortunately, once acute rehab is complete, transition to LTC would not be as smooth, as Pt would need to transport to a SNF for continued rehab under STR, then transition to LTC  CM following

## 2023-04-26 NOTE — ASSESSMENT & PLAN NOTE
Wt Readings from Last 3 Encounters:   04/26/23 126 kg (277 lb 5 4 oz)   09/09/21 127 kg (280 lb)   06/09/21 127 kg (280 lb)     · Pt with h/o chronic diastolic chf  · Most recent 2D echo 2/2020: Normal left ventricular ejection fraction  Grade 1 diastolic dysfunction    · Clinically euvolemic   Continue lasix 40mg bid

## 2023-04-26 NOTE — ARC ADMISSION
Referral received for patient consideration of [facility]C placement for rehab  Will review with [facility] physician as able and will update CM on determination

## 2023-04-26 NOTE — PLAN OF CARE
Problem: MOBILITY - ADULT  Goal: Maintain or return to baseline ADL function  Description: INTERVENTIONS:  -  Assess patient's ability to carry out ADLs; assess patient's baseline for ADL function and identify physical deficits which impact ability to perform ADLs (bathing, care of mouth/teeth, toileting, grooming, dressing, etc )  - Assess/evaluate cause of self-care deficits   - Assess range of motion  - Assess patient's mobility; develop plan if impaired  - Assess patient's need for assistive devices and provide as appropriate  - Encourage maximum independence but intervene and supervise when necessary  - Involve family in performance of ADLs  - Assess for home care needs following discharge   - Consider OT consult to assist with ADL evaluation and planning for discharge  - Provide patient education as appropriate  Outcome: Progressing  Goal: Maintains/Returns to pre admission functional level  Description: INTERVENTIONS:  - Perform BMAT or MOVE assessment daily    - Set and communicate daily mobility goal to care team and patient/family/caregiver     - Collaborate with rehabilitation services on mobility goals if consulted  - Out of bed for toileting  - Record patient progress and toleration of activity level   Outcome: Progressing     Problem: PAIN - ADULT  Goal: Verbalizes/displays adequate comfort level or baseline comfort level  Description: Interventions:  - Encourage patient to monitor pain and request assistance  - Assess pain using appropriate pain scale  - Administer analgesics based on type and severity of pain and evaluate response  - Implement non-pharmacological measures as appropriate and evaluate response  - Consider cultural and social influences on pain and pain management  - Notify physician/advanced practitioner if interventions unsuccessful or patient reports new pain  Outcome: Progressing     Problem: INFECTION - ADULT  Goal: Absence or prevention of progression during hospitalization  Description: INTERVENTIONS:  - Assess and monitor for signs and symptoms of infection  - Monitor lab/diagnostic results  - Monitor all insertion sites, i e  indwelling lines, tubes, and drains  - Monitor endotracheal if appropriate and nasal secretions for changes in amount and color  - [place] appropriate cooling/warming therapies per order  - Administer medications as ordered  - Instruct and encourage patient and family to use good hand hygiene technique  - Identify and instruct in appropriate isolation precautions for identified infection/condition  Outcome: Progressing     Problem: DISCHARGE PLANNING  Goal: Discharge to home or other facility with appropriate resources  Description: INTERVENTIONS:  - Identify barriers to discharge w/patient and caregiver  - Arrange for needed discharge resources and transportation as appropriate  - Identify discharge learning needs (meds, wound care, etc )  - Arrange for interpretive services to assist at discharge as needed  - Refer to Case Management Department for coordinating discharge planning if the patient needs post-hospital services based on physician/advanced practitioner order or complex needs related to functional status, cognitive ability, or social support system  Outcome: Progressing     Problem: Prexisting or High Potential for Compromised Skin Integrity  Goal: Skin integrity is maintained or improved  Description: INTERVENTIONS:  - Identify patients at risk for skin breakdown  - Assess and monitor skin integrity  - Assess and monitor nutrition and hydration status  - Monitor labs   - Assess for incontinence   - Turn and reposition patient  - Assist with mobility/ambulation  - Relieve pressure over bony prominences  - Avoid friction and shearing  - Provide appropriate hygiene as needed including keeping skin clean and dry  - Evaluate need for skin moisturizer/barrier cream  - Collaborate with interdisciplinary team   - Patient/family teaching  - Consider wound care consult   Outcome: Progressing

## 2023-04-26 NOTE — PROGRESS NOTES
"[address] [place] [address]  Progress Note  Name: Mahin Luz  MRN: [id number]  Unit/Bed#: E5 -01 I Date of Admission: 4/17/2023   Date of Service: 4/26/2023 I Hospital Day: 9    Assessment/Plan   * Cellulitis of right ankle  Assessment & Plan  Presents with RLE foot wound, compression stocking tourniquet around R ankle with injury down to exposed tendon, cellulitic changes     Pt was followed by the vascular surgery and podiatry teams   Initial concern for osteomyelitis versus cellulitis   MRI showing diffuse subcutaneous edema without definitive osteomyelitis   Wound cx growing GNRs pantoea species susceptible to cephalosporin   LEAD study shows diffuse disease of right lower extremity, but per vascular surgery has adequate flow for healing potential   no vascular intervention at this time, patient with high risk of limb loss in the future   Podiatry recommending local wound care, will need close outpatient follow up   Transitioned IV abx to keflex to complete 10 day total course (day #9/10)   Patient is medically ready for discharge: Awaiting rehab bed    Fracture, toe  Assessment & Plan  · Patient suffered an injury to his right first toe when he \"stubbed it\"  · X-ray revealed \"nondisplaced fracture, terminal tuft of the right 1st toe  \"  · Patient was eval to podiatry team: No current intervention required    Continue wound care with Betadine    CAD (coronary artery disease)  Assessment & Plan  · Patient has coronary artery disease with prior CABG   · continue aspirin, statin, plavix  · No evidence of acute ischemia  · Continue outpatient follow-up    Diabetic autonomic neuropathy associated with type 2 diabetes mellitus ([facility] [address] )  Assessment & Plan  Lab Results   Component Value Date    HGBA1C 6 7 (A) 02/02/2023     · Patient has diabetes type 2, with long-term use of insulin, with associated peripheral arterial disease and neuropathy   · Continue modified insulin regimen   · Home " regimen was:  80 units BID  · Due to hypoglycemia his current insulin regimen is Lantus 40 units every 12 hours  · Continue sliding scale, algorithm 4  · Decrease lantus 40 units from 50 BID  · improved glycemic control  · Blood sugars today [id number]: Continue to monitor and titrate as needed    Chronic diastolic heart failure [facility])  Assessment & Plan  Wt Readings from Last 3 Encounters:   04/26/23 126 kg (277 lb 5 4 oz)   09/09/21 127 kg (280 lb)   06/09/21 127 kg (280 lb)     · Pt with h/o chronic diastolic chf  · Most recent 2D echo 2/2020: Normal left ventricular ejection fraction  Grade 1 diastolic dysfunction  · Clinically euvolemic   Continue lasix 40mg bid    Class 2 severe obesity due to excess calories with serious comorbidity and body mass index (BMI) of 35 0 to 35 9 in adult [facility])  Assessment & Plan  BMI 36  Dietary counseling, lifestyle modification    Diabetic gastroparesis (HCC)  Assessment & Plan  Continue PPI once daily  cont reglan 5mg TID PRN with meals  Recommend small frequent meals  Pt tolerating po    PAD (peripheral artery disease) ([facility] [address] )  Assessment & Plan  · Patient has a history of peripheral arterial disease  · History of previous left BKA in 2018  · Right lower extremity peripheral arterial disease:  · LEADS with right 50-70% proximal popliteal stenosis  · Patient was evaluated by the vascular surgery team  · Continue aspirin, Plavix, statin  · No current vascular intervention indicated: Vascular surgery notes patient is at high risk of limb loss given extent of injury at his right ankle    Currently has adequate healing potential based on his leads  · Outpatient follow-up  · Medically ready for discharge: Awaiting short-term rehab bed    Hyperlipidemia  Assessment & Plan  Continue statin               Family:  Called wife Renee Coker and gave update    dw pts nurse  dw     VTE Pharmacologic Prophylaxis: Enoxaparin (Lovenox)  VTE Mechanical Prophylaxis: sequential compression device        Certification Statement: The patient will continue to require additional inpatient hospital stay due to need for further acute intervention for STR    Status: inpatient     =======================================================    Subjective:  Pt denies any pain anywhere  Denies any cp, sob/cough  Denies any abd pain, n/v/d  Tolerating po  Denies any dizziness/lightheadedness    Physical Exam:   Temp:  [97 3 °F (36 3 °C)-98 °F (36 7 °C)] 97 3 °F (36 3 °C)  HR:  [59-83] 83  Resp:  [17-18] 18  BP: (131-141)/(54-64) 141/64    Gen:  Pleasant, non-tachypnic, non-dyspnic  Conversant  Heart: regular rate and rhythm, S1S2 present, no murmur, rub or gallop  Lungs: clear to ausculatation bilaterally  No wheezing, crackles, or rhonchi  No accessory muscle use or respiratory distress  Abd: soft, non-tender, non-distended  NABS, no guarding, rebound or peritoneal signs  Extremities: RLE dressing in place  LLE BKA  Neuro: awake, alert  Fluent speech   Skin: dressing in place RLE    LABS:   Results from last 7 days   Lab Units 04/24/23  0807 04/21/23  0528   WBC Thousand/uL 9 03 8 22   HEMOGLOBIN g/dL 12 4 12 1   HEMATOCRIT % 39 0 37 5   PLATELETS Thousands/uL 279 267     Results from last 7 days   Lab Units 04/24/23  0807 04/21/23  0528   POTASSIUM mmol/L 4 4 3 8   CHLORIDE mmol/L 95* 97   CO2 mmol/L 33* 29   BUN mg/dL 19 16   CREATININE mg/dL 0 94 0 96   CALCIUM mg/dL 8 9 8 7       Hospital Data:    4/18 MRI right ankle/heel  Severe subcutaneous edema with ulceration seen medially as above    There is mild marrow edema along the medial malleolus, nonspecific with differential considerations as above   No confluent decreased T1 marrow signal to definitively suggest osteomyelitis at this time      4/18 LE ADS  RIGHT LOWER LIMB:  There is a 50-75% stenosis in the proximal popliteal artery, and diffuse  atherosclerotic arterial disease throughout the remaining portions of the  femoropopliteal vessels  Unable to visualize the calf vessels due to  depth/edema/induration  Ankle/Brachial index: Unable to obtain due to ulcer/bandaging and induration  (Prior0  95, normal range )  Metatarsal pressure of 123 mm Hg  2nd digit pressure of 73 mm Hg, within the healing range  PVR/ PPG tracings are dampened      LEFT LOWER LIMB:  BKA  Compared to previous study on 8/12/2019, there is no significant change in  disease  Recommend repeat duplex in 1 year to monitor for plaque progression     4/17 right foot x-ra  Nondisplaced fracture, terminal tuft of the right 1st toe       4/17 right ankle x-ray  No acute osseous abnormality     Microbiology:  4/17: Wound culture: 2+ Pantoea septica Pantoea species        ---------------------------------------------------------------------------------------------------------------  This note has been constructed using a voice recognition system

## 2023-04-26 NOTE — ASSESSMENT & PLAN NOTE
· Patient has a history of peripheral arterial disease  · History of previous left BKA in 2018  · Right lower extremity peripheral arterial disease:  · LEADS with right 50-70% proximal popliteal stenosis  · Patient was evaluated by the vascular surgery team  · Continue aspirin, Plavix, statin  · No current vascular intervention indicated: Vascular surgery notes patient is at high risk of limb loss given extent of injury at his right ankle    Currently has adequate healing potential based on his leads  · Outpatient follow-up  · Medically ready for discharge: Awaiting short-term rehab bed

## 2023-04-26 NOTE — CASE MANAGEMENT
Called Ignacio Schmitz ([phone]) per CM's request to get list of in-network facilities  Spoke to DONNY, who gave facilities:  [facility] [address]  [employer]  [facility] [address] [place], [place] [address]arely Desir [facility] notified

## 2023-04-27 ENCOUNTER — TELEPHONE (OUTPATIENT)
Dept: PODIATRY | Facility: CLINIC | Age: 63
End: 2023-04-27

## 2023-04-27 LAB
GLUCOSE SERPL-MCNC: 159 MG/DL (ref 65–140)
GLUCOSE SERPL-MCNC: 203 MG/DL (ref 65–140)
GLUCOSE SERPL-MCNC: 221 MG/DL (ref 65–140)
GLUCOSE SERPL-MCNC: 231 MG/DL (ref 65–140)

## 2023-04-27 RX ADMIN — INSULIN LISPRO 4 UNITS: 100 INJECTION, SOLUTION INTRAVENOUS; SUBCUTANEOUS at 16:01

## 2023-04-27 RX ADMIN — INSULIN LISPRO 4 UNITS: 100 INJECTION, SOLUTION INTRAVENOUS; SUBCUTANEOUS at 12:25

## 2023-04-27 RX ADMIN — CEPHALEXIN 500 MG: 500 CAPSULE ORAL at 17:39

## 2023-04-27 RX ADMIN — CLOPIDOGREL BISULFATE 75 MG: 75 TABLET ORAL at 08:17

## 2023-04-27 RX ADMIN — CEPHALEXIN 500 MG: 500 CAPSULE ORAL at 05:27

## 2023-04-27 RX ADMIN — PANTOPRAZOLE SODIUM 40 MG: 40 TABLET, DELAYED RELEASE ORAL at 05:27

## 2023-04-27 RX ADMIN — INSULIN GLARGINE 40 UNITS: 100 INJECTION, SOLUTION SUBCUTANEOUS at 08:16

## 2023-04-27 RX ADMIN — TAMSULOSIN HYDROCHLORIDE 0.4 MG: 0.4 CAPSULE ORAL at 16:01

## 2023-04-27 RX ADMIN — FINASTERIDE 5 MG: 5 TABLET, FILM COATED ORAL at 08:17

## 2023-04-27 RX ADMIN — CEPHALEXIN 500 MG: 500 CAPSULE ORAL at 23:09

## 2023-04-27 RX ADMIN — INSULIN LISPRO 4 UNITS: 100 INJECTION, SOLUTION INTRAVENOUS; SUBCUTANEOUS at 21:37

## 2023-04-27 RX ADMIN — ATORVASTATIN CALCIUM 80 MG: 80 TABLET, FILM COATED ORAL at 17:39

## 2023-04-27 RX ADMIN — INSULIN LISPRO 2 UNITS: 100 INJECTION, SOLUTION INTRAVENOUS; SUBCUTANEOUS at 08:16

## 2023-04-27 RX ADMIN — CEPHALEXIN 500 MG: 500 CAPSULE ORAL at 12:25

## 2023-04-27 RX ADMIN — FUROSEMIDE 40 MG: 40 TABLET ORAL at 17:39

## 2023-04-27 RX ADMIN — INSULIN GLARGINE 40 UNITS: 100 INJECTION, SOLUTION SUBCUTANEOUS at 21:37

## 2023-04-27 RX ADMIN — ASPIRIN 81 MG: 81 TABLET, COATED ORAL at 08:17

## 2023-04-27 RX ADMIN — POLYETHYLENE GLYCOL 3350 17 G: 17 POWDER, FOR SOLUTION ORAL at 08:17

## 2023-04-27 NOTE — CASE MANAGEMENT
[facility] received request for authorization from Care Manager  Authorization request for: [address] Name: [address]  NPI: [id number]  Facility MD: Jordy Alegre  NPI: [id number]  Authorization initiated by contacting insurance: [employer] Via: Phone  Pending Reference #: [id number]  Clinicals submitted via:  Fax

## 2023-04-27 NOTE — PLAN OF CARE
Problem: MOBILITY - ADULT  Goal: Maintain or return to baseline ADL function  Description: INTERVENTIONS:  -  Assess patient's ability to carry out ADLs; assess patient's baseline for ADL function and identify physical deficits which impact ability to perform ADLs (bathing, care of mouth/teeth, toileting, grooming, dressing, etc )  - Assess/evaluate cause of self-care deficits   - Assess range of motion  - Assess patient's mobility; develop plan if impaired  - Assess patient's need for assistive devices and provide as appropriate  - Encourage maximum independence but intervene and supervise when necessary  - Involve family in performance of ADLs  - Assess for home care needs following discharge   - Consider OT consult to assist with ADL evaluation and planning for discharge  - Provide patient education as appropriate  Outcome: Progressing  Goal: Maintains/Returns to pre admission functional level  Description: INTERVENTIONS:  - Perform BMAT or MOVE assessment daily    - Set and communicate daily mobility goal to care team and patient/family/caregiver     - Collaborate with rehabilitation services on mobility goals if consulted  - Out of bed for toileting  - Record patient progress and toleration of activity level   Outcome: Progressing     Problem: PAIN - ADULT  Goal: Verbalizes/displays adequate comfort level or baseline comfort level  Description: Interventions:  - Encourage patient to monitor pain and request assistance  - Assess pain using appropriate pain scale  - Administer analgesics based on type and severity of pain and evaluate response  - Implement non-pharmacological measures as appropriate and evaluate response  - Consider cultural and social influences on pain and pain management  - Notify physician/advanced practitioner if interventions unsuccessful or patient reports new pain  Outcome: Progressing     Problem: INFECTION - ADULT  Goal: Absence or prevention of progression during hospitalization  Description: INTERVENTIONS:  - Assess and monitor for signs and symptoms of infection  - Monitor lab/diagnostic results  - Monitor all insertion sites, i e  indwelling lines, tubes, and drains  - Monitor endotracheal if appropriate and nasal secretions for changes in amount and color  - [place] appropriate cooling/warming therapies per order  - Administer medications as ordered  - Instruct and encourage patient and family to use good hand hygiene technique  - Identify and instruct in appropriate isolation precautions for identified infection/condition  Outcome: Progressing     Problem: DISCHARGE PLANNING  Goal: Discharge to home or other facility with appropriate resources  Description: INTERVENTIONS:  - Identify barriers to discharge w/patient and caregiver  - Arrange for needed discharge resources and transportation as appropriate  - Identify discharge learning needs (meds, wound care, etc )  - Arrange for interpretive services to assist at discharge as needed  - Refer to Case Management Department for coordinating discharge planning if the patient needs post-hospital services based on physician/advanced practitioner order or complex needs related to functional status, cognitive ability, or social support system  Outcome: Progressing     Problem: Prexisting or High Potential for Compromised Skin Integrity  Goal: Skin integrity is maintained or improved  Description: INTERVENTIONS:  - Identify patients at risk for skin breakdown  - Assess and monitor skin integrity  - Assess and monitor nutrition and hydration status  - Monitor labs   - Assess for incontinence   - Turn and reposition patient  - Assist with mobility/ambulation  - Relieve pressure over bony prominences  - Avoid friction and shearing  - Provide appropriate hygiene as needed including keeping skin clean and dry  - Evaluate need for skin moisturizer/barrier cream  - Collaborate with interdisciplinary team   - Patient/family teaching  - Consider wound care consult   Outcome: Progressing

## 2023-04-27 NOTE — TELEPHONE ENCOUNTER
Caller: Ruthie Young    Doctor: Hoa Morin, DPM    Reason for call: Kamille Lund is asking Dr Acosta Aguilar to call her as their is some miscommunication with Jory Jiménez understanding what Dr Acosta Aguilar told him vs what the  is saying  Kamille Lund would like to talk to Dr Acosta Aguilar so she can understand the plan  Jory Jiménez thinks it is short term rehab and the  said it is long term      Call back#: [phone]

## 2023-04-27 NOTE — ASSESSMENT & PLAN NOTE
History of orthostatic hypotension in which pt was on florinef and midodrine  Will check orthostatic vitals

## 2023-04-27 NOTE — CASE MANAGEMENT
Case Management Discharge Planning Note    Patient name Jesús Wells  Location [address] [address] /E5 [place] [phone]-* MRN [id number]  : 1960 Date 2023       Current Admission Date: 2023  Current Admission Diagnosis:Cellulitis of right ankle   Patient Active Problem List    Diagnosis Date Noted    Fracture, toe 2023    Cellulitis of right ankle 2023    Non-pressure chronic ulcer of right calf with fat layer exposed ([place] [address] ) 2023    Other acute osteomyelitis, left ankle and foot ([place] [address] ) 10/29/2020    Embolism and thrombosis of arteries of the lower extremities ([place] [address] ) 10/29/2020    Lymphedema of right lower extremity 10/29/2020    Venous stasis dermatitis of right lower extremity 10/29/2020    Abdominal distension 10/29/2020    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    Nodular rash 2020    CAD (coronary artery disease) 2019    Amputated below knee, left ([place] [address] ) 10/04/2018    Phantom limb syndrome without pain ([place] [address] ) 10/04/2018    Obstructive sleep apnea 2018    Hyponatremia 2018    Diabetic autonomic neuropathy associated with type 2 diabetes mellitus ([place] [address] ) 2018    S/P CABG x 3 2018    Other constipation 2018    Chronic diastolic heart failure ([place] [address] ) 2018    Hypertensive heart disease with congestive heart failure (HCC)     Triple vessel coronary artery disease 2018    Diabetic gastroparesis ([place] [address] ) 2018    Class 2 severe obesity due to excess calories with serious comorbidity and body mass index (BMI) of 35 0 to 35 9 in adult ([place] [address] ) 2018    Orthostatic hypotension 2018    PAD (peripheral artery disease) ([place] [address] ) 2018    S/P BKA (below knee amputation), left ([place] [address] ) 2018    Anxiety and depression 2017    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 2017    Vitamin D deficiency 2016    Hyperlipidemia 2015    Diabetic neuropathy, type II diabetes mellitus ([facility] [address] ) 11/06/2014      LOS (days): 10  Geometric Mean LOS (GMLOS) (days): 3 00  Days to GMLOS:-6 7     OBJECTIVE:  Risk of Unplanned Readmission Score: 10 72         Current admission status: Inpatient   Preferred Pharmacy:   [facility] [address] RAZIA PEREIRA [address]  [facility] [address]  Phone: [phone] Fax: [phone]    [facility] ([address],   [address] [facility]  [place]  [address] [facility] [address]  Phone: [phone] Fax: [phone]    [facility] ([facility] ) - [address],[facility]  [address]  Phone: [phone] Fax: [phone]    Primary Care Provider: Clay Chen DO    Primary Insurance: [facility] [address] REP  Secondary Insurance:     DISCHARGE DETAILS:    Discharge planning discussed with[de-identified] Paulina Perera (Spouse) [phone] (M) & Patient  Freedom of Choice: Yes    Additional Comments: [facility] stating Pt has been approved for STR & Acute respectively  Pt states his preference is Acute  Both facilities updated of same  Auth submitted for Acute  CM discussed dcp with Pt and his spouse David Ordonez regarding LOS-Pt is stating he will only need 7-10 days of rehab/woundcare  CM imformed Pt that inpatient time needed for wound and rehab cannot be determined at this time, and would be determined based on medical needs combimed with Pts goals  Pt acknowledged  CM following for prior auth for Acute Rehab  Should insurance deny, Pt in agreement with STR at [facility]  CM remains available through discharge

## 2023-04-27 NOTE — PROGRESS NOTES
"[facility]  Progress Note  Name: Rodney Wright I  MRN: [id number]  Unit/Bed#: E5 -01 I Date of Admission: 4/17/2023   Date of Service: 4/27/2023 I Hospital Day: 10    Assessment/Plan   Fracture, toe  Assessment & Plan  · Patient suffered an injury to his right first toe when he \"stubbed it\"  · X-ray revealed \"nondisplaced fracture, terminal tuft of the right 1st toe  \"  · Patient was eval to podiatry team: No current intervention required  Continue wound care with Betadine    CAD (coronary artery disease)  Assessment & Plan  · Patient has coronary artery disease with prior CABG   · continue aspirin, statin, plavix  · No evidence of acute ischemia  · Continue outpatient follow-up    Diabetic autonomic neuropathy associated with type 2 diabetes mellitus ([facility] [address] )  Assessment & Plan  Lab Results   Component Value Date    HGBA1C 6 7 (A) 02/02/2023     · Patient has diabetes type 2, with long-term use of insulin, with associated peripheral arterial disease and neuropathy   · Continue modified insulin regimen   · Home regimen was:  80 units BID  · Due to hypoglycemia his current insulin regimen is Lantus 40 units every 12 hours  · Continue sliding scale, algorithm 4  · improved glycemic control      Chronic diastolic heart failure (HCC)  Assessment & Plan  Wt Readings from Last 3 Encounters:   04/27/23 127 kg (279 lb 1 6 oz)   09/09/21 127 kg (280 lb)   06/09/21 127 kg (280 lb)     · Pt with h/o chronic diastolic chf  · Most recent 2D echo 2/2020: Normal left ventricular ejection fraction  Grade 1 diastolic dysfunction    · Clinically euvolemic   Continue lasix 40mg bid    Class 2 severe obesity due to excess calories with serious comorbidity and body mass index (BMI) of 35 0 to 35 9 in adult [facility])  Assessment & Plan  BMI 36  Dietary counseling, lifestyle modification    Diabetic gastroparesis (HCC)  Assessment & Plan  Continue PPI once daily  cont reglan 5mg TID PRN with meals  Recommend " small frequent meals  Pt tolerating po    Orthostatic hypotension  Assessment & Plan  History of orthostatic hypotension in which pt was on florinef and midodrine  Will check orthostatic vitals  PAD (peripheral artery disease) ([facility] [address] )  Assessment & Plan  · Patient has a history of peripheral arterial disease  · History of previous left BKA in 2018  · Right lower extremity peripheral arterial disease:  · LEADS with right 50-70% proximal popliteal stenosis  · Patient was evaluated by the vascular surgery team  · Continue aspirin, Plavix, statin  · No current vascular intervention indicated: Vascular surgery notes patient is at high risk of limb loss given extent of injury at his right ankle    Currently has adequate healing potential based on his leads  · Outpatient follow-up  · Medically ready for discharge: Awaiting short-term rehab bed    Hyperlipidemia  Assessment & Plan  Continue statin    * Cellulitis of right ankle  Assessment & Plan  Presents with RLE foot wound, compression stocking tourniquet around R ankle with injury down to exposed tendon, cellulitic changes     Pt was followed by the vascular surgery and podiatry teams   Initial concern for osteomyelitis versus cellulitis   MRI showing diffuse subcutaneous edema without definitive osteomyelitis   Wound cx growing GNRs pantoea species susceptible to cephalosporin   LEAD study shows diffuse disease of right lower extremity, but per vascular surgery has adequate flow for healing potential   no vascular intervention at this time, patient with high risk of limb loss in the future   Podiatry recommending local wound care, will need close outpatient follow up   Transitioned IV abx to keflex to complete 10 day total course (day #10/10)   Patient is medically ready for discharge: Awaiting rehab bed           VTE Pharmacologic Prophylaxis: lovenox     Education and Discussions with Family / Patient: patient     Current Length of Stay: 10 day(s)  Current Patient Status: Inpatient     Discharge Plan / Estimated Discharge Date: awaiting str  Code Status: Level 1 - Full Code      Subjective:   Pt seen and examined  Pt states in the past when he worked with physical therapy he had lower bp when he stood up and required medication to keep his bp elevated  He is worried that this will happen again  He states that he is getting stronger  No other problems were noted  Objective:     Vitals:   Temp (24hrs), Av 9 °F (36 6 °C), Min:97 3 °F (36 3 °C), Max:98 8 °F (37 1 °C)    Temp:  [97 3 °F (36 3 °C)-98 8 °F (37 1 °C)] 97 5 °F (36 4 °C)  HR:  [72-83] 72  Resp:  [17-18] 18  BP: (107-141)/(46-64) 109/54  SpO2:  [98 %-99 %] 99 %  Body mass index is 36 82 kg/m²  Input and Output Summary (last 24 hours): Intake/Output Summary (Last 24 hours) at 2023 1503  Last data filed at 2023 1200  Gross per 24 hour   Intake --   Output 2950 ml   Net -2950 ml       Physical Exam:   Physical Exam  Constitutional:       Appearance: Normal appearance  HENT:      Head: Normocephalic and atraumatic  Eyes:      Extraocular Movements: Extraocular movements intact  Pupils: Pupils are equal, round, and reactive to light  Cardiovascular:      Rate and Rhythm: Normal rate and regular rhythm  Heart sounds: No murmur heard  No friction rub  No gallop  Pulmonary:      Effort: Pulmonary effort is normal  No respiratory distress  Breath sounds: Normal breath sounds  No wheezing, rhonchi or rales  Abdominal:      General: Bowel sounds are normal  There is no distension  Palpations: Abdomen is soft  Tenderness: There is no abdominal tenderness  There is no guarding or rebound  Neurological:      Mental Status: He is alert and oriented to person, place, and time            Additional Data:     Labs:  Results from last 7 days   Lab Units 23  0807   WBC Thousand/uL 9 03   HEMOGLOBIN g/dL 12 4   HEMATOCRIT % 39 0   PLATELETS Thousands/uL 279 NEUTROS PCT % 72   LYMPHS PCT % 18   MONOS PCT % 6   EOS PCT % 3     Results from last 7 days   Lab Units 04/24/23  0807   SODIUM mmol/L 134*   POTASSIUM mmol/L 4 4   CHLORIDE mmol/L 95*   CO2 mmol/L 33*   BUN mg/dL 19   CREATININE mg/dL 0 94   ANION GAP mmol/L 6   CALCIUM mg/dL 8 9   GLUCOSE RANDOM mg/dL 141*         Results from last 7 days   Lab Units 04/27/23  1053 04/27/23  0722 04/26/23  2111 04/26/23  1730 04/26/23  1558 04/26/23  1122 04/26/23  0806 04/25/23  2052 04/25/23  1556 04/25/23  1108 04/25/23  0721 04/24/23  2106   POC GLUCOSE mg/dl 221* 159* 174* 209* 218* 196* 157* 163* 229* 179* 126 196*                   Recent Cultures (last 7 days):           Lines/Drains:  Invasive Devices     None               Last 24 Hours Medication List:   Current Facility-Administered Medications   Medication Dose Route Frequency Provider Last Rate    acetaminophen  650 mg Oral Q6H PRN Cloretta Shawl, PA-C      aluminum-magnesium hydroxide-simethicone  30 mL Oral Q6H PRN Cloretta Shawl, PA-C      aspirin  81 mg Oral Daily Cloretta Shawl, PA-C      atorvastatin  80 mg Oral After Faby James PA-C      cephalexin  500 mg Oral Q6H [facility] Amrik Quinones MD      clopidogrel  75 mg Oral Daily Cloretta Shawl, PA-C      enoxaparin  40 mg Subcutaneous Daily Cloretta Shawl, PA-C      finasteride  5 mg Oral Daily Cloretta Shawl, PA-C      furosemide  40 mg Oral BID Cloretta Shawl, PA-C      insulin glargine  40 Units Subcutaneous Q12H [facility] Mya Cardoza PA-C      insulin lispro  2-12 Units Subcutaneous 4x Daily (AC & HS) Cloretta Perezwl, PA-C      metoclopramide  10 mg Oral TID PRN Glo Palomino MD      pantoprazole  40 mg Oral Early Morning Cloretta Shawl, PA-C      polyethylene glycol  17 g Oral Daily Cloretta Shawl, PA-C      tamsulosin  0 4 mg Oral Daily With Faby James PA-C          Today, Patient Was Seen By: Sia Amado, DO

## 2023-04-27 NOTE — PLAN OF CARE
Problem: MOBILITY - ADULT  Goal: Maintain or return to baseline ADL function  Description: INTERVENTIONS:  -  Assess patient's ability to carry out ADLs; assess patient's baseline for ADL function and identify physical deficits which impact ability to perform ADLs (bathing, care of mouth/teeth, toileting, grooming, dressing, etc )  - Assess/evaluate cause of self-care deficits   - Assess range of motion  - Assess patient's mobility; develop plan if impaired  - Assess patient's need for assistive devices and provide as appropriate  - Encourage maximum independence but intervene and supervise when necessary  - Involve family in performance of ADLs  - Assess for home care needs following discharge   - Consider OT consult to assist with ADL evaluation and planning for discharge  - Provide patient education as appropriate  Outcome: Progressing  Goal: Maintains/Returns to pre admission functional level  Description: INTERVENTIONS:  - Perform BMAT or MOVE assessment daily    - Set and communicate daily mobility goal to care team and patient/family/caregiver     - Collaborate with rehabilitation services on mobility goals if consulted  - Out of bed for toileting  - Record patient progress and toleration of activity level   Outcome: Progressing     Problem: PAIN - ADULT  Goal: Verbalizes/displays adequate comfort level or baseline comfort level  Description: Interventions:  - Encourage patient to monitor pain and request assistance  - Assess pain using appropriate pain scale  - Administer analgesics based on type and severity of pain and evaluate response  - Implement non-pharmacological measures as appropriate and evaluate response  - Consider cultural and social influences on pain and pain management  - Notify physician/advanced practitioner if interventions unsuccessful or patient reports new pain  Outcome: Progressing     Problem: INFECTION - ADULT  Goal: Absence or prevention of progression during hospitalization  Description: INTERVENTIONS:  - Assess and monitor for signs and symptoms of infection  - Monitor lab/diagnostic results  - Monitor all insertion sites, i e  indwelling lines, tubes, and drains  - Monitor endotracheal if appropriate and nasal secretions for changes in amount and color  - [place] appropriate cooling/warming therapies per order  - Administer medications as ordered  - Instruct and encourage patient and family to use good hand hygiene technique  - Identify and instruct in appropriate isolation precautions for identified infection/condition  Outcome: Progressing     Problem: DISCHARGE PLANNING  Goal: Discharge to home or other facility with appropriate resources  Description: INTERVENTIONS:  - Identify barriers to discharge w/patient and caregiver  - Arrange for needed discharge resources and transportation as appropriate  - Identify discharge learning needs (meds, wound care, etc )  - Arrange for interpretive services to assist at discharge as needed  - Refer to Case Management Department for coordinating discharge planning if the patient needs post-hospital services based on physician/advanced practitioner order or complex needs related to functional status, cognitive ability, or social support system  Outcome: Progressing     Problem: Prexisting or High Potential for Compromised Skin Integrity  Goal: Skin integrity is maintained or improved  Description: INTERVENTIONS:  - Identify patients at risk for skin breakdown  - Assess and monitor skin integrity  - Assess and monitor nutrition and hydration status  - Monitor labs   - Assess for incontinence   - Turn and reposition patient  - Assist with mobility/ambulation  - Relieve pressure over bony prominences  - Avoid friction and shearing  - Provide appropriate hygiene as needed including keeping skin clean and dry  - Evaluate need for skin moisturizer/barrier cream  - Collaborate with interdisciplinary team   - Patient/family teaching  - Consider wound care consult   Outcome: Progressing

## 2023-04-27 NOTE — ASSESSMENT & PLAN NOTE
Wt Readings from Last 3 Encounters:   04/27/23 127 kg (279 lb 1 6 oz)   09/09/21 127 kg (280 lb)   06/09/21 127 kg (280 lb)     · Pt with h/o chronic diastolic chf  · Most recent 2D echo 2/2020: Normal left ventricular ejection fraction  Grade 1 diastolic dysfunction    · Clinically euvolemic   Continue lasix 40mg bid

## 2023-04-27 NOTE — ASSESSMENT & PLAN NOTE
Lab Results   Component Value Date    HGBA1C 6 7 (A) 02/02/2023     · Patient has diabetes type 2, with long-term use of insulin, with associated peripheral arterial disease and neuropathy   · Continue modified insulin regimen   · Home regimen was:  80 units BID  · Due to hypoglycemia his current insulin regimen is Lantus 40 units every 12 hours  · Continue sliding scale, algorithm 4  · improved glycemic control

## 2023-04-27 NOTE — ARC ADMISSION
Patient case reviewed with [facility] physician and he has been pre-approved for [facility] pending medical stability, insurance authorization and bed availability  CM has been updated  CM has been notified to initiate authorization for insurance

## 2023-04-27 NOTE — ASSESSMENT & PLAN NOTE
Presents with RLE foot wound, compression stocking tourniquet around R ankle with injury down to exposed tendon, cellulitic changes     Pt was followed by the vascular surgery and podiatry teams   Initial concern for osteomyelitis versus cellulitis   MRI showing diffuse subcutaneous edema without definitive osteomyelitis   Wound cx growing GNRs pantoea species susceptible to cephalosporin   LEAD study shows diffuse disease of right lower extremity, but per vascular surgery has adequate flow for healing potential   no vascular intervention at this time, patient with high risk of limb loss in the future   Podiatry recommending local wound care, will need close outpatient follow up   Transitioned IV abx to keflex to complete 10 day total course (day #10/10)   Patient is medically ready for discharge: Awaiting rehab bed

## 2023-04-28 LAB
GLUCOSE SERPL-MCNC: 146 MG/DL (ref 65–140)
GLUCOSE SERPL-MCNC: 163 MG/DL (ref 65–140)
GLUCOSE SERPL-MCNC: 213 MG/DL (ref 65–140)
GLUCOSE SERPL-MCNC: 265 MG/DL (ref 65–140)

## 2023-04-28 RX ORDER — MIDODRINE HYDROCHLORIDE 2.5 MG/1
2.5 TABLET ORAL
Status: DISCONTINUED | OUTPATIENT
Start: 2023-04-28 | End: 2023-05-02 | Stop reason: HOSPADM

## 2023-04-28 RX ADMIN — PANTOPRAZOLE SODIUM 40 MG: 40 TABLET, DELAYED RELEASE ORAL at 08:27

## 2023-04-28 RX ADMIN — ATORVASTATIN CALCIUM 80 MG: 80 TABLET, FILM COATED ORAL at 17:50

## 2023-04-28 RX ADMIN — INSULIN LISPRO 6 UNITS: 100 INJECTION, SOLUTION INTRAVENOUS; SUBCUTANEOUS at 11:47

## 2023-04-28 RX ADMIN — INSULIN LISPRO 2 UNITS: 100 INJECTION, SOLUTION INTRAVENOUS; SUBCUTANEOUS at 16:28

## 2023-04-28 RX ADMIN — ASPIRIN 81 MG: 81 TABLET, COATED ORAL at 08:26

## 2023-04-28 RX ADMIN — INSULIN GLARGINE 40 UNITS: 100 INJECTION, SOLUTION SUBCUTANEOUS at 22:14

## 2023-04-28 RX ADMIN — INSULIN GLARGINE 40 UNITS: 100 INJECTION, SOLUTION SUBCUTANEOUS at 08:26

## 2023-04-28 RX ADMIN — MIDODRINE HYDROCHLORIDE 2.5 MG: 2.5 TABLET ORAL at 16:28

## 2023-04-28 RX ADMIN — INSULIN LISPRO 4 UNITS: 100 INJECTION, SOLUTION INTRAVENOUS; SUBCUTANEOUS at 22:14

## 2023-04-28 RX ADMIN — MIDODRINE HYDROCHLORIDE 2.5 MG: 2.5 TABLET ORAL at 13:41

## 2023-04-28 RX ADMIN — CLOPIDOGREL BISULFATE 75 MG: 75 TABLET ORAL at 08:26

## 2023-04-28 RX ADMIN — TAMSULOSIN HYDROCHLORIDE 0.4 MG: 0.4 CAPSULE ORAL at 16:28

## 2023-04-28 RX ADMIN — FINASTERIDE 5 MG: 5 TABLET, FILM COATED ORAL at 08:26

## 2023-04-28 RX ADMIN — FUROSEMIDE 40 MG: 40 TABLET ORAL at 08:26

## 2023-04-28 RX ADMIN — FUROSEMIDE 40 MG: 40 TABLET ORAL at 17:50

## 2023-04-28 NOTE — OCCUPATIONAL THERAPY NOTE
Occupational Therapy Progress Note     Patient Name: Adela Valdez  Today's Date: 4/28/2023  Problem List  Principal Problem:    Cellulitis of right ankle  Active Problems:    Hyperlipidemia    PAD (peripheral artery disease) (HCC)    Orthostatic hypotension    Diabetic gastroparesis (HCC)    Class 2 severe obesity due to excess calories with serious comorbidity and body mass index (BMI) of 35 0 to 35 9 in [facility])    Chronic diastolic heart failure ([facility])    Diabetic autonomic neuropathy associated with type 2 diabetes mellitus ([facility] )    CAD (coronary artery disease)    Fracture, toe       04/28/23 0929   OT Last Visit   OT Visit Date 04/28/23   Note Type   Note Type Treatment for insurance authorization   Pain Assessment   Pain Assessment Tool 0-10   Pain Score No Pain   Restrictions/Precautions   Weight Bearing Precautions Per Order Yes   RLE Weight Bearing Per Order WBAT   Braces or Orthoses Other (Comment)  (prosthesis L LE, darco surgical shoe R LE )   Other Precautions Fall Risk;WBS  (hypotension)   ADL   Where Assessed Wheelchair   Grooming Assistance 5  Supervision/Setup   Grooming Deficit Setup;Verbal cueing;Supervision/safety; Increased time to complete;Wash/dry face;Brushing hair   Grooming Comments cues for safety   UB Bathing Assistance 5  Supervision/Setup   UB Bathing Deficit Setup;Verbal cueing;Supervision/safety; Increased time to complete   LB Bathing Assistance 3  Moderate Assistance   LB Bathing Deficit Steadying;Verbal cueing;Supervision/safety; Increased time to complete;Right lower leg including foot; Buttocks   UB Dressing Assistance 5  Supervision/Setup   UB Dressing Deficit Setup;Supervision/safety; Increased time to complete;Verbal cueing   LB Dressing Assistance 3  Moderate Assistance   LB Dressing Deficit Setup; Requires assistive device for steadying;Steadying;Verbal cueing;Supervision/safety; Increased time to complete; Don/doff R shoe; Thread LLE into underwear; Thread RLE into "underwear;Pull up over hips   LB Dressing Comments assist don R surgical shoe, assist to don over b/l feet, pt able to pull underwear up to knees  Able to pull over hips with alternating unilateral UE support of sink with CGA   Toileting Assistance  3  Moderate Assistance   Toileting Deficit Clothing management down;Perineal hygiene   Toileting Comments impaired functional reach for posterior hygiene   Bed Mobility   Additional Comments pt  standing at EOB upon enterring with PCA  Transfers   Sit to Stand 4  Minimal assistance   Additional items Assist x 1; Armrests; Increased time required;Verbal cues; Bedrails; Other  (RW)   Stand to Sit 4  Minimal assistance   Additional items Assist x 1;Bedrails;Armrests; Increased time required;Verbal cues  (VCs for controlled descent)   Toilet transfer 4  Minimal assistance   Additional items Assist x 1; Increased time required;Verbal cues;Standard toilet; Other  (L grab bar, w/c<>toilet)   Additional Comments (S)  BPs: seated in w/c post transfer: 106/54, post ambulation: 87/52, seated for 5 min in w/c: 152/73; post 2nd amb: 96/51  Functional Mobility   Functional Mobility 4  Minimal assistance   Additional Comments Ax1, with chair follow   Additional items Rolling walker   Subjective   Subjective \"I really want to go to acute rehab so that I can get back to being independent and being strong\"   Cognition   Overall Cognitive Status [facility]   Arousal/Participation Alert; Cooperative;Responsive   Attention Within functional limits   Orientation Level Oriented X4   Memory Within functional limits   Following Commands Follows one step commands without difficulty   Activity Tolerance   Activity Tolerance Patient limited by fatigue;Treatment limited secondary to medical complications (Comment)  (orthostatic hypotension)   Medical Staff Made Aware RN, PTA, CM, Dr Rell Lopez seen for OT f/u treatment session focusing on functional activity tolerance, ADLs, " functional transfers/mobility and energy conservation education  Patient agreeable to OT treatment session, upon arrival patient was found standing at bedside with PCA  Patient very motivated to participate in therapies, eager to begin rehab at [facility]  Pt continues to make progress towards POC  Please refer to flowsheet for functional performance  Provided education on energy conservation techniques, deep breathing techniques, fall prevention strategies, and overall safety awareness  Patient requiring verbal cues for safety and verbal cues for pacing through activity steps  Patient continues to be functioning below baseline level, occupational performance remains limited secondary to factors listed above and increased risk for falls and injury  Pt also limited by orthostatic hypotension, notified Dr Axel Johnson via TT  Pt seated OOB in chair at end of session  Call bell and phone within reach  All needs met and pt reports no further questions for OT at this time  Continue to recommend STR when medically cleared  OT to follow pt on caseload to address deficits to facilitate return to PLOF  Plan   Treatment Interventions ADL retraining;Functional transfer training;UE strengthening/ROM; Endurance training;Patient/family training;Equipment evaluation/education; Compensatory technique education;Continued evaluation; Energy conservation; Activityengagement   Goal Expiration Date 05/03/23   OT Treatment Day 3   OT Frequency 3-5x/wk   Recommendation   OT Discharge Recommendation Post acute rehabilitation services   AM-PAC Daily Activity Inpatient   Lower Body Dressing 2   Bathing 2   Toileting 2   Upper Body Dressing 3   Grooming 3   Eating 4   Daily Activity Raw Score 16   Daily Activity Standardized Score (Calc for Raw Score >=11) 35 96   AM-PAC Applied Cognition Inpatient   Following a Speech/Presentation 4   Understanding Ordinary Conversation 4   Taking Medications 3   Remembering Where Things Are Placed or Put Away 3 Remembering List of 4-5 Errands 3   Taking Care of Complicated Tasks 3   Applied Cognition Raw Score 20   Applied Cognition Standardized Score 41 76     Gumaro Fajardo

## 2023-04-28 NOTE — PROGRESS NOTES
"[facility]  Progress Note  Name: Sangita Ruby I  MRN: [id number]  Unit/Bed#: E5 -01 I Date of Admission: 4/17/2023   Date of Service: 4/28/2023 I Hospital Day: 11    Assessment/Plan   Fracture, toe  Assessment & Plan  · Patient suffered an injury to his right first toe when he \"stubbed it\"  · X-ray revealed \"nondisplaced fracture, terminal tuft of the right 1st toe  \"  · Patient was eval to podiatry team: No current intervention required  Continue wound care with Betadine    CAD (coronary artery disease)  Assessment & Plan  · Patient has coronary artery disease with prior CABG   · continue aspirin, statin, plavix  · No evidence of acute ischemia  · Continue outpatient follow-up    Diabetic autonomic neuropathy associated with type 2 diabetes mellitus ([facility] [address] )  Assessment & Plan  Lab Results   Component Value Date    HGBA1C 6 7 (A) 02/02/2023     · Patient has diabetes type 2, with long-term use of insulin, with associated peripheral arterial disease and neuropathy   · Continue modified insulin regimen   · Home regimen was:  80 units BID  · Due to hypoglycemia his current insulin regimen is Lantus 40 units every 12 hours  · Continue sliding scale, algorithm 4  · improved glycemic control      Chronic diastolic heart failure (HCC)  Assessment & Plan  Wt Readings from Last 3 Encounters:   04/28/23 127 kg (279 lb 8 7 oz)   09/09/21 127 kg (280 lb)   06/09/21 127 kg (280 lb)     · Pt with h/o chronic diastolic chf  · Most recent 2D echo 2/2020: Normal left ventricular ejection fraction  Grade 1 diastolic dysfunction    · Clinically euvolemic   Continue lasix 40mg bid    Class 2 severe obesity due to excess calories with serious comorbidity and body mass index (BMI) of 35 0 to 35 9 in adult [facility])  Assessment & Plan  BMI 36  Dietary counseling, lifestyle modification    Diabetic gastroparesis (HCC)  Assessment & Plan  Continue PPI once daily  cont reglan 5mg TID PRN with meals  Recommend " small frequent meals  Pt tolerating po    Orthostatic hypotension  Assessment & Plan  History of orthostatic hypotension in which pt was on florinef and midodrine  Positive orthostatic vitals: 104- 87  Will start low dose midodrine of 2 5mg as he is 042 systolic at times with sitting  PAD (peripheral artery disease) ([address] )  Assessment & Plan  · Patient has a history of peripheral arterial disease  · History of previous left BKA in 2018  · Right lower extremity peripheral arterial disease:  · LEADS with right 50-70% proximal popliteal stenosis  · Patient was evaluated by the vascular surgery team  · Continue aspirin, Plavix, statin  · No current vascular intervention indicated: Vascular surgery notes patient is at high risk of limb loss given extent of injury at his right ankle    Currently has adequate healing potential based on his leads  · Outpatient follow-up  · Medically ready for discharge: Awaiting short-term rehab bed    Hyperlipidemia  Assessment & Plan  Continue statin    * Cellulitis of right ankle  Assessment & Plan  Presents with RLE foot wound, compression stocking tourniquet around R ankle with injury down to exposed tendon, cellulitic changes     Pt was followed by the vascular surgery and podiatry teams   Initial concern for osteomyelitis versus cellulitis   MRI showing diffuse subcutaneous edema without definitive osteomyelitis   Wound cx growing GNRs pantoea species susceptible to cephalosporin   LEAD study shows diffuse disease of right lower extremity, but per vascular surgery has adequate flow for healing potential   no vascular intervention at this time, patient with high risk of limb loss in the future   Podiatry recommending local wound care, will need close outpatient follow up   Completed course of antibiotics    Patient is medically ready for discharge: Awaiting rehab bed           VTE Pharmacologic Prophylaxis: lovenox     Education and Discussions with Family / Patient: patient    Current Length of Stay: 11 day(s)  Current Patient Status: Inpatient     Discharge Plan / Estimated Discharge Date: awaiting str  Code Status: Level 1 - Full Code      Subjective:   Pt seen and examined  He has a significant drop in bp with standing to 11N systolic  It did rebound to 160 after lying down  No other problems today     Objective:     Vitals:   Temp (24hrs), Av 7 °F (36 5 °C), Min:97 6 °F (36 4 °C), Max:97 7 °F (36 5 °C)    Temp:  [97 6 °F (36 4 °C)-97 7 °F (36 5 °C)] 97 6 °F (36 4 °C)  HR:  [49-78] 49  Resp:  [18] 18  BP: ()/(50-78) 152/73  SpO2:  [97 %-100 %] 100 %  Body mass index is 36 88 kg/m²  Input and Output Summary (last 24 hours): Intake/Output Summary (Last 24 hours) at 2023 1409  Last data filed at 2023 1228  Gross per 24 hour   Intake 360 ml   Output 500 ml   Net -140 ml       Physical Exam:   Physical Exam  Constitutional:       Appearance: Normal appearance  HENT:      Head: Normocephalic and atraumatic  Eyes:      Extraocular Movements: Extraocular movements intact  Pupils: Pupils are equal, round, and reactive to light  Cardiovascular:      Rate and Rhythm: Normal rate and regular rhythm  Heart sounds: No murmur heard  No friction rub  No gallop  Pulmonary:      Effort: Pulmonary effort is normal  No respiratory distress  Breath sounds: Normal breath sounds  No wheezing, rhonchi or rales  Abdominal:      General: Bowel sounds are normal  There is no distension  Palpations: Abdomen is soft  Tenderness: There is no abdominal tenderness  There is no guarding or rebound  Neurological:      Mental Status: He is alert and oriented to person, place, and time            Additional Data:     Labs:  Results from last 7 days   Lab Units 23  0807   WBC Thousand/uL 9 03   HEMOGLOBIN g/dL 12 4   HEMATOCRIT % 39 0   PLATELETS Thousands/uL 279   NEUTROS PCT % 72   LYMPHS PCT % 18   MONOS PCT % 6   EOS PCT % 3 Results from last 7 days   Lab Units 04/24/23  0807   SODIUM mmol/L 134*   POTASSIUM mmol/L 4 4   CHLORIDE mmol/L 95*   CO2 mmol/L 33*   BUN mg/dL 19   CREATININE mg/dL 0 94   ANION GAP mmol/L 6   CALCIUM mg/dL 8 9   GLUCOSE RANDOM mg/dL 141*         Results from last 7 days   Lab Units 04/28/23  1107 04/28/23  0727 04/27/23  2045 04/27/23  1542 04/27/23  1053 04/27/23  0722 04/26/23  2111 04/26/23  1730 04/26/23  1558 04/26/23  1122 04/26/23  0806 04/25/23 2052   POC GLUCOSE mg/dl 265* 146* 231* 203* 221* 159* 174* 209* 218* 196* 157* 163*               Recent Cultures (last 7 days):           Lines/Drains:  Invasive Devices     None               Last 24 Hours Medication List:   Current Facility-Administered Medications   Medication Dose Route Frequency Provider Last Rate    acetaminophen  650 mg Oral Q6H PRN Diana Boast, PA-C      aluminum-magnesium hydroxide-simethicone  30 mL Oral Q6H PRN Diana Boast, PA-C      aspirin  81 mg Oral Daily Diana Boast, PA-C      atorvastatin  80 mg Oral After Isatu Barton PA-C      clopidogrel  75 mg Oral Daily Diana Boast, PA-C      enoxaparin  40 mg Subcutaneous Daily Diana Boast, PA-C      finasteride  5 mg Oral Daily Diana Boast, PA-C      furosemide  40 mg Oral BID Diana Boast, PA-C      insulin glargine  40 Units Subcutaneous Q12H [facility] Chava Dotson PA-C      insulin lispro  2-12 Units Subcutaneous 4x Daily (AC & HS) Diana Boast, PA-C      metoclopramide  10 mg Oral TID PRN Charlene Pedroza MD      midodrine  2 5 mg Oral TID AC Marybeth Woodward DO      pantoprazole  40 mg Oral Early Morning Diana Boast, PA-C      polyethylene glycol  17 g Oral Daily Diana Boast, PA-C      tamsulosin  0 4 mg Oral Daily With Isatu Barton PA-C          Today, Patient Was Seen By: Lexa Bowers DO

## 2023-04-28 NOTE — PLAN OF CARE
Problem: MOBILITY - ADULT  Goal: Maintain or return to baseline ADL function  Description: INTERVENTIONS:  -  Assess patient's ability to carry out ADLs; assess patient's baseline for ADL function and identify physical deficits which impact ability to perform ADLs (bathing, care of mouth/teeth, toileting, grooming, dressing, etc )  - Assess/evaluate cause of self-care deficits   - Assess range of motion  - Assess patient's mobility; develop plan if impaired  - Assess patient's need for assistive devices and provide as appropriate  - Encourage maximum independence but intervene and supervise when necessary  - Involve family in performance of ADLs  - Assess for home care needs following discharge   - Consider OT consult to assist with ADL evaluation and planning for discharge  - Provide patient education as appropriate  Outcome: Progressing  Goal: Maintains/Returns to pre admission functional level  Description: INTERVENTIONS:  - Perform BMAT or MOVE assessment daily    - Set and communicate daily mobility goal to care team and patient/family/caregiver     - Collaborate with rehabilitation services on mobility goals if consulted  - Out of bed for toileting  - Record patient progress and toleration of activity level   Outcome: Progressing     Problem: PAIN - ADULT  Goal: Verbalizes/displays adequate comfort level or baseline comfort level  Description: Interventions:  - Encourage patient to monitor pain and request assistance  - Assess pain using appropriate pain scale  - Administer analgesics based on type and severity of pain and evaluate response  - Implement non-pharmacological measures as appropriate and evaluate response  - Consider cultural and social influences on pain and pain management  - Notify physician/advanced practitioner if interventions unsuccessful or patient reports new pain  Outcome: Progressing     Problem: INFECTION - ADULT  Goal: Absence or prevention of progression during hospitalization  Description: INTERVENTIONS:  - Assess and monitor for signs and symptoms of infection  - Monitor lab/diagnostic results  - Monitor all insertion sites, i e  indwelling lines, tubes, and drains  - Monitor endotracheal if appropriate and nasal secretions for changes in amount and color  - [place] appropriate cooling/warming therapies per order  - Administer medications as ordered  - Instruct and encourage patient and family to use good hand hygiene technique  - Identify and instruct in appropriate isolation precautions for identified infection/condition  Outcome: Progressing     Problem: DISCHARGE PLANNING  Goal: Discharge to home or other facility with appropriate resources  Description: INTERVENTIONS:  - Identify barriers to discharge w/patient and caregiver  - Arrange for needed discharge resources and transportation as appropriate  - Identify discharge learning needs (meds, wound care, etc )  - Arrange for interpretive services to assist at discharge as needed  - Refer to Case Management Department for coordinating discharge planning if the patient needs post-hospital services based on physician/advanced practitioner order or complex needs related to functional status, cognitive ability, or social support system  Outcome: Progressing     Problem: Prexisting or High Potential for Compromised Skin Integrity  Goal: Skin integrity is maintained or improved  Description: INTERVENTIONS:  - Identify patients at risk for skin breakdown  - Assess and monitor skin integrity  - Assess and monitor nutrition and hydration status  - Monitor labs   - Assess for incontinence   - Turn and reposition patient  - Assist with mobility/ambulation  - Relieve pressure over bony prominences  - Avoid friction and shearing  - Provide appropriate hygiene as needed including keeping skin clean and dry  - Evaluate need for skin moisturizer/barrier cream  - Collaborate with interdisciplinary team   - Patient/family teaching  - Consider wound care consult   Outcome: Progressing

## 2023-04-28 NOTE — PLAN OF CARE
Problem: MOBILITY - ADULT  Goal: Maintain or return to baseline ADL function  Description: INTERVENTIONS:  -  Assess patient's ability to carry out ADLs; assess patient's baseline for ADL function and identify physical deficits which impact ability to perform ADLs (bathing, care of mouth/teeth, toileting, grooming, dressing, etc )  - Assess/evaluate cause of self-care deficits   - Assess range of motion  - Assess patient's mobility; develop plan if impaired  - Assess patient's need for assistive devices and provide as appropriate  - Encourage maximum independence but intervene and supervise when necessary  - Involve family in performance of ADLs  - Assess for home care needs following discharge   - Consider OT consult to assist with ADL evaluation and planning for discharge  - Provide patient education as appropriate  Outcome: Progressing  Goal: Maintains/Returns to pre admission functional level  Description: INTERVENTIONS:  - Perform BMAT or MOVE assessment daily    - Set and communicate daily mobility goal to care team and patient/family/caregiver     - Collaborate with rehabilitation services on mobility goals if consulted  - Out of bed for toileting  - Record patient progress and toleration of activity level   Outcome: Progressing     Problem: PAIN - ADULT  Goal: Verbalizes/displays adequate comfort level or baseline comfort level  Description: Interventions:  - Encourage patient to monitor pain and request assistance  - Assess pain using appropriate pain scale  - Administer analgesics based on type and severity of pain and evaluate response  - Implement non-pharmacological measures as appropriate and evaluate response  - Consider cultural and social influences on pain and pain management  - Notify physician/advanced practitioner if interventions unsuccessful or patient reports new pain  Outcome: Progressing     Problem: INFECTION - ADULT  Goal: Absence or prevention of progression during hospitalization  Description: INTERVENTIONS:  - Assess and monitor for signs and symptoms of infection  - Monitor lab/diagnostic results  - Monitor all insertion sites, i e  indwelling lines, tubes, and drains  - Monitor endotracheal if appropriate and nasal secretions for changes in amount and color  - [place] appropriate cooling/warming therapies per order  - Administer medications as ordered  - Instruct and encourage patient and family to use good hand hygiene technique  - Identify and instruct in appropriate isolation precautions for identified infection/condition  Outcome: Progressing     Problem: DISCHARGE PLANNING  Goal: Discharge to home or other facility with appropriate resources  Description: INTERVENTIONS:  - Identify barriers to discharge w/patient and caregiver  - Arrange for needed discharge resources and transportation as appropriate  - Identify discharge learning needs (meds, wound care, etc )  - Arrange for interpretive services to assist at discharge as needed  - Refer to Case Management Department for coordinating discharge planning if the patient needs post-hospital services based on physician/advanced practitioner order or complex needs related to functional status, cognitive ability, or social support system  Outcome: Progressing     Problem: Prexisting or High Potential for Compromised Skin Integrity  Goal: Skin integrity is maintained or improved  Description: INTERVENTIONS:  - Identify patients at risk for skin breakdown  - Assess and monitor skin integrity  - Assess and monitor nutrition and hydration status  - Monitor labs   - Assess for incontinence   - Turn and reposition patient  - Assist with mobility/ambulation  - Relieve pressure over bony prominences  - Avoid friction and shearing  - Provide appropriate hygiene as needed including keeping skin clean and dry  - Evaluate need for skin moisturizer/barrier cream  - Collaborate with interdisciplinary team   - Patient/family teaching  - Consider wound care consult   Outcome: Progressing

## 2023-04-28 NOTE — PROGRESS NOTES
PHYSICAL MEDICINE AND REHABILITATION   PREADMISSION ASSESSMENT     Projected [facility] Diagnoses:  Impairment of mobility, safety and Activities of Daily Living (ADLs) due to Debility:  16  Debility (Non-cardiac/Non-pulmonary)    Etiologic:  Cellulitis of Right Ankle    Date of Onset: 4/17/2023      Date of surgery: N/A    PATIENT INFORMATION  Name: Nicanor Manzanares Phone #: [phone] (home)   Address: [address] [place] [id number]  YOB: 1960 Age: 60 y o  SS#   Marital Status: /Civil Union  Ethnicity:   Employment Status: on disability  Extended Emergency Contact Information  Primary Emergency Contact: Ema King  Address: [address] [place] [address] [place]  [address] Phone: [phone]  Relation: Spouse  Advance Directive: Level 1 - Full Code, No Advanced Directives on File    INSURANCE/COVERAGE:     Primary Payor: [facility] REP / Plan: [address] [place] [address] REP / Product Type: Medicare HMO /   Secondary Payer:<NONE>   Payer Contact:  Payer Contact:   Contact Phone:  Contact Phone:     Authorization #:   Coverage Dates:  LCD:     Medical Record #: [id number]    REFERRAL SOURCE:   Referring provider: Jose J Sotomayor DO  Referring facility: [address] [place] [address]   Room: [place] [address] /E5 -*  PCP: Nicolle Anders DO PCP phone number: [phone]    MEDICAL INFORMATION  HPI: Nicanor Manzanares is a 60 y o  male who presented to [facility] ED on 4/17 due to pain in RLE due to chronic RLE foot wound, compressive injury around the R ankle with exposed tendon from wearing a compression sock for 3 wks and R hallux injury today from stubbing the toe with subsequent bleeding  He is a former smoker with HLD, dCHF, DMII with peripheral neuropathy, CAD s/p CABG, stage III lymphedema, right foot drop, PAD s/p previous L pop recanalization and 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    Patient does have a LLE prosthesis  He has been deemed hemodynamically stable at this time  PT/OT therapies were consulted and continue to follow patient at this time and are recommending inpatient acute rehab when medically stable  All involved medical disciplines feel/agree patient is medically ready for discharge at this time  Inpatient acute rehabilitation physician was consulted  Upon review of patients case and correspondence with PT/OT therapy services, [facility] physician feels he will benefit and is a good candidate and appropriate for inpatient acute rehab at this time  He has demonstrated the willingness, desire and tolerance to participate in the required 3 hours or more of therapies per day  COVID Vaccination Status:  Chava Close - 3/30/21, 4/30/21 and Moderna Bivalent Booster 12/10/22  COVID Testing Completed: ***    Past Medical History:   Past Surgical History:    Allergies:     Past Medical History:   Diagnosis Date    Anxiety     Anxiety and depression     Cataract     [facility] failure [facility]     Coronary artery disease     Depression     Diabetes mellitus [facility]     Diabetic autonomic neuropathy associated with type 2 diabetes mellitus ([facility] )     Last Assessed: 12/28/2017    History of DVT (deep vein thrombosis)     left LE    Hyperlipidemia     Lymphedema of right lower extremity     Obesity     Orthostatic hypotension     Past Surgical History:   Procedure Laterality Date    CORONARY ARTERY BYPASS GRAFT      EYE SURGERY      cataracts bilateral    LEG AMPUTATION THROUGH LOWER TIBIA AND FIBULA Left 8/3/2018    Procedure: AMPUTATION BELOW KNEE (BKA) L BKA;  Surgeon: Eddye Schirmer, MD;  Location: [facility];  Service: Vascular    OK CORONARY ARTERY BYP W/VEIN & ARTERY GRAFT 4 VEIN N/A 3/28/2018    Procedure: CORONARY ARTERY BYPASS GRAFT (CABG) x3 VESSELS, LIMA TO LAD, SVG--> PDA, SVG--> OM2,  RIGHT LEG EVH/SVH TO , INTRA-OP AMANDEEP;  Surgeon: Sal Winkler MD;  Location[facility]; Service: Cardiac Surgery    ROTATOR CUFF REPAIR Bilateral      Allergies   Allergen Reactions    Metformin      Allergy listed on nursing home paperwork         Medical/functional conditions requiring inpatient rehabilitation: ***    Risk for medical/clinical complications: ***    Comorbidities/Surgeries in the last 100 days: {ARC Comorbidities:20510}    CURRENT VITAL SIGNS:   Temp:  [97 6 °F (36 4 °C)-97 7 °F (36 5 °C)] 97 6 °F (36 4 °C)  HR:  [49-78] 49  Resp:  [18] 18  BP: ()/(50-78) 152/73   Intake/Output Summary (Last 24 hours) at 4/28/2023 1501  Last data filed at 4/28/2023 1228  Gross per 24 hour   Intake 360 ml   Output 500 ml   Net -140 ml        LABORATORY RESULTS:      Lab Results   Component Value Date    HGB 12 4 04/24/2023    HGB 15 4 09/13/2016    HCT 39 0 04/24/2023    HCT 46 3 09/13/2016    WBC 9 03 04/24/2023    WBC 11 2 (H) 09/13/2016     Lab Results   Component Value Date    BUN 19 04/24/2023    BUN 19 09/13/2016     09/13/2016    K 4 4 04/24/2023    K 5 2 09/13/2016    CL 95 (L) 04/24/2023    CL 96 (L) 09/13/2016    GLUCOSE 165 (H) 03/28/2018    GLUCOSE 215 (H) 09/13/2016    CREATININE 0 94 04/24/2023    CREATININE 1 03 09/13/2016     Lab Results   Component Value Date    PROTIME 12 9 08/29/2021    INR 0 99 08/29/2021        DIAGNOSTIC STUDIES:  XR ankle 3+ views RIGHT    Result Date: 4/18/2023  Impression: No acute osseous abnormality  Workstation performed: [id number]     XR foot 3+ views RIGHT    Result Date: 4/18/2023  Impression: Nondisplaced fracture, terminal tuft of the right 1st toe  Findings concur with the referring clinician's preliminary interpretation already in the patient's electronic health record  Workstation performed: [id number]     MRI ankle/heel right wo contrast    Result Date: 4/20/2023  Impression: Severe subcutaneous edema with ulceration seen medially as above   There is mild marrow edema along the medial malleolus, nonspecific with differential considerations as above  No confluent decreased T1 marrow signal to definitively suggest osteomyelitis at this time  Workstation performed: [id number]       PRECAUTIONS/SPECIAL NEEDS:  Tobacco:   Social History     Tobacco Use   Smoking Status Former    Packs/day: 1 00    Years: 12 00    Pack years: 12 00    Types: Cigarettes    Quit date: 3/23/1994    Years since quittin 1   Smokeless Tobacco Never   , Alcohol:    Social History     Substance and Sexual Activity   Alcohol Use No    Comment: rarely   , Weight Bearing Precautions:  weight bearing as tolerated, Splints/Braces: Darco shoe on RLE, Anticoagulation:  aspirin 81 mg orally every day and Lovenox, Blood Sugar Management: as per MD recommendations, Edema Management, Safety Concerns, Dietary Restrictions: Consistent Carbohydrate Level 2 (5 carb servings/75 grams CHO/meal); Sodium 2 GM;  Fluid Restriction 2000 ML and Language Preference: English    MEDICATIONS:     Current Facility-Administered Medications:     acetaminophen (TYLENOL) tablet 650 mg, 650 mg, Oral, Q6H PRN, Margareth Kirvin, PA-C    aluminum-magnesium hydroxide-simethicone (MYLANTA) oral suspension 30 mL, 30 mL, Oral, Q6H PRN, Margareth Kirvin, PA-C    aspirin (ECOTRIN LOW STRENGTH) EC tablet 81 mg, 81 mg, Oral, Daily, Margareth Kirvin, PA-C, 81 mg at 23 0826    atorvastatin (LIPITOR) tablet 80 mg, 80 mg, Oral, After Nando Ramírez, PA-C, 80 mg at 23 1739    clopidogrel (PLAVIX) tablet 75 mg, 75 mg, Oral, Daily, Margareth Maryuri, PA-C, 75 mg at 23 0826    enoxaparin (LOVENOX) subcutaneous injection 40 mg, 40 mg, Subcutaneous, Daily, Margareth Kirvin, PA-C, 40 mg at 23 0847    finasteride (PROSCAR) tablet 5 mg, 5 mg, Oral, Daily, Margareth Kirvin, PA-C, 5 mg at 23 1807    furosemide (LASIX) tablet 40 mg, 40 mg, Oral, BID, Margareth Kirvin, PA-C, 40 mg at 23 0826    insulin glargine (LANTUS) subcutaneous injection 40 Units 0 4 mL, 40 Units, Subcutaneous, Q12H Obdulia Tineo Charlotte Burnette PA-C, 40 Units at 23 0826    insulin lispro (HumaLOG) 100 units/mL subcutaneous injection 2-12 Units, 2-12 Units, Subcutaneous, 4x Daily (AC & HS), 6 Units at 23 1147 **AND** Fingerstick Glucose (POCT), , , 4x Daily AC and at bedtime, Ovidio Pena PA-C    metoclopramide (REGLAN) tablet 10 mg, 10 mg, Oral, TID PRN, Gee Ding MD    midodrine (PROAMATINE) tablet 2 5 mg, 2 5 mg, Oral, TID AC, Marybeth Lopesron, DO, 2 5 mg at 23 1341    pantoprazole (PROTONIX) EC tablet 40 mg, 40 mg, Oral, Early Morning, Ovidio Pena PA-C, 40 mg at 23 0827    polyethylene glycol (MIRALAX) packet 17 g, 17 g, Oral, Daily, Ovidio Pena PA-C, 17 g at 23 0817    tamsulosin (FLOMAX) capsule 0 4 mg, 0 4 mg, Oral, Daily With Dinner, Ovidio Pena PA-C, 0 4 mg at 23 1601    SKIN INTEGRITY:   WOUND - Pressure Injury Sacrum - Foam, silicon dressing; clean, dry, intact  WOUND - Right Venous Ulcer Pretibial Anterior - Moderate drainage, cleansed, irrigation with NSS; Calcium alginate silver dressing with gauze; clean, dry, intact    PRIOR LEVEL OF FUNCTION:  He lives in [facility] single family home  Blayne Justin is  and lives with their spouse  Self Care: Needed some help, Indoor Mobility: independent at w/c level, Stairs (in/outdoor): Not applicable and Cognition: Independent    FALLS IN THE LAST 6 MONTHS: 0    HOME ENVIRONMENT:  The living area: can live on one level  There are unknown number of stairs to enter the home but there are rails  The patient will not have 24 hour supervision/physical assistance available upon discharge      PREVIOUS DME:  Equipment in home (previous DME): Rolling Walker, Reacher, Manual Wheelchair and lift chair    FUNCTIONAL STATUS:***  Physical Therapy Occupational Therapy Speech Therapy          CARE SCORES:  Self Care:  Eating: {ARC Pre Admission Screenin}  Oral hygiene: {ARC Pre Admission Screenin}  Toilet hygiene: {ARC Pre Admission Screenin}  Shower/bathing self: {ARC Pre Admission Screenin}  Upper body dressing: {ARC Pre Admission Screenin}  Lower body dressing: {ARC Pre Admission Screenin}  Putting on/taking off footwear: {ARC Pre Admission Screenin}  Transfers:  Roll left and right: {ARC Pre Admission Screenin}  Sit to lying: {ARC Pre Admission Screenin}  Lying to sitting on side of bed: {ARC Pre Admission Screenin}  Sit to stand: {ARC Pre Admission Screenin}  Chair/bed to chair transfer: {ARC Pre Admission Screenin}  Toilet transfer: {ARC Pre Admission Screenin}  Mobility:  Walk 10 ft: {ARC Pre Admission Screenin}  Walk 50 ft with two turns: {ARC Pre Admission Screenin}  Walk 150ft: {ARC Pre Admission Screenin}    CURRENT GAP IN FUNCTION  Prior to Admission: Functional Status: Mobility/Ambulation: independent with mobility in the home on carpet and linoleum/hardwood with wheelchair for home distances for 300 feet  Transfers: independent for wheelchair, bed, commode, and car transfers using no assistive devices  ADL's: patient required assist  Orientation, Memory, Problem Solving: independent    Expected functional outcomes: It is expected that with skilled acute rehabilitation services the patient will progress to Independent for self care and Independent for mobility     Estimated length of stay: 10 to 14 days    Anticipated Post-Discharge Disposition/Treatment  Disposition: Return to previous home/apartment    Outpatient Services: Physical Therapy (PT)    BARRIERS TO DISCHARGE  Weakness, Pain, Balance Difficulty, Fatigue, Caregiver Accessibility, Financial Resources, Equipment Needs and Resource Availability    INTERVENTIONS FOR DISCHARGE  Adaptive equipment, Patient/Family/Caregiver Education, Bariatric Equipment, Financial Assistance, Medication Changes as per MD recommendations, Therapy exercises, Center of balance support  and Energy conservation education     REQUIRED THERAPY:  Patient will require PT and OT 90 minutes each per day, five days per week to achieve rehab goals  REQUIRED FUNCTIONAL AND MEDICAL MANAGEMENT FOR INPATIENT REHABILITATION:  Pain Management: Overall pain is well controlled, Deep Vein Thrombosis (DVT) Prophylaxis:  SCD's while in bed and Lovenox, Nursing education and bowel/bladder management, internal medicine to manage/monitor medical conditions, PM&R to maximize function and provide medical oversight, PT/OT intervention, patient/family education/training, and any consults as needed       RECOMMENDED LEVEL OF CARE: ***

## 2023-04-28 NOTE — OCCUPATIONAL THERAPY NOTE
Pt seen for OT f/u treatment session focusing on functional activity tolerance, ADLs, functional transfers/mobility and energy conservation education  Patient agreeable to OT treatment session, upon arrival patient was found standing at bedside with PCA  Pt continues to make progress towards POC  *** Please refer to flowsheet for functional performance  Provided education on HEP, energy conservation techniques, deep breathing techniques, fall prevention strategies, and overall safety awareness  Patient requiring verbal cues for safety and verbal cues for pacing through activity steps  Patient continues to be functioning below baseline level, occupational performance remains limited secondary to factors listed above and increased risk for falls and injury  Pt {SFENDOFSESSION:69027} at end of session  Call bell and phone within reach  All needs met and pt reports no further questions for OT at this time  Continue to recommend *** when medically cleared  OT to follow pt on caseload to address deficits to facilitate return to PLOF

## 2023-04-28 NOTE — PLAN OF CARE
Problem: OCCUPATIONAL THERAPY ADULT  Goal: Performs self-care activities at highest level of function for planned discharge setting  See evaluation for individualized goals  Description: Treatment Interventions: ADL retraining, UE strengthening/ROM, Functional transfer training, Endurance training, Cognitive reorientation, Patient/family training, Equipment evaluation/education, Compensatory technique education, Energy conservation, Activityengagement          See flowsheet documentation for full assessment, interventions and recommendations  Outcome: Progressing  Note: Limitation: Decreased ADL status, Decreased UE strength, Decreased UE ROM, Decreased Safe judgement during ADL, Decreased sensation, Decreased endurance, Decreased self-care trans, Decreased high-level ADLs  Prognosis: Fair  Assessment: Pt seen for OT f/u treatment session focusing on functional activity tolerance, ADLs, functional transfers/mobility and energy conservation education  Patient agreeable to OT treatment session, upon arrival patient was found standing at bedside with PCA  Patient very motivated to participate in therapies, eager to begin rehab at [facility]  Pt continues to make progress towards POC  Please refer to flowsheet for functional performance  Provided education on energy conservation techniques, deep breathing techniques, fall prevention strategies, and overall safety awareness  Patient requiring verbal cues for safety and verbal cues for pacing through activity steps  Patient continues to be functioning below baseline level, occupational performance remains limited secondary to factors listed above and increased risk for falls and injury  Pt also limited by orthostatic hypotension, notified Dr Manuelito Moore via TT  Pt seated OOB in chair at end of session  Call bell and phone within reach  All needs met and pt reports no further questions for OT at this time  Continue to recommend STR when medically cleared   OT to follow pt on caseload to address deficits to facilitate return to PLOF       OT Discharge Recommendation: Post acute rehabilitation services

## 2023-04-28 NOTE — ASSESSMENT & PLAN NOTE
Presents with RLE foot wound, compression stocking tourniquet around R ankle with injury down to exposed tendon, cellulitic changes     Pt was followed by the vascular surgery and podiatry teams   Initial concern for osteomyelitis versus cellulitis   MRI showing diffuse subcutaneous edema without definitive osteomyelitis   Wound cx growing GNRs pantoea species susceptible to cephalosporin   LEAD study shows diffuse disease of right lower extremity, but per vascular surgery has adequate flow for healing potential   no vascular intervention at this time, patient with high risk of limb loss in the future   Podiatry recommending local wound care, will need close outpatient follow up   Completed course of antibiotics    Patient is medically ready for discharge: Awaiting rehab bed

## 2023-04-28 NOTE — PHYSICAL THERAPY NOTE
PHYSICAL THERAPY NOTE          Patient Name: Jayne Modi  KBLHK'H Date: 4/28/2023 04/28/23 0930   Note Type   Note Type Treatment for insurance authorization   Pain Assessment   Pain Assessment Tool 0-10   Pain Score No Pain   Restrictions/Precautions   RLE Weight Bearing Per Order WBAT   Braces or Orthoses Other (Comment)  (prosthesis l le, darco surgical shoe R le )   Other Precautions Fall Risk   General   Chart Reviewed Yes   Family/Caregiver Present Yes   Cognition   Overall Cognitive Status WFL   Arousal/Participation Alert; Cooperative   Attention Within functional limits   Orientation Level Oriented X4   Memory Within functional limits   Following Commands Follows one step commands without difficulty   Subjective   Subjective OK   Bed Mobility   Additional Comments pt  standing at EOB upon enterring with PCA  Transfers   Sit to Stand 4  Minimal assistance   Additional items Assist x 1; Armrests; Increased time required;Verbal cues; Bedrails   Stand to Sit 4  Minimal assistance   Additional items Assist x 1;Bedrails;Armrests; Increased time required;Verbal cues  (cues for ease of descent)   Ambulation/Elevation   Gait pattern Improper Weight shift; Forward Flexion; Steppage; Short stride; Excessively slow   Gait Assistance 4  Minimal assist   Additional items Assist x 1;Verbal cues   Assistive Device Bariatric Rolling walker   Distance 5' x1, seated rest 20' x1, seated rest 40' x1,seated rest, 20' x1   Ambulation/Elevation Additional Comments gait unsteady however no gross lob noted  b/l foot slap with steppage gait and decreased foot clearance with flexed posture     Balance   Static Sitting Good   Dynamic Sitting Fair +   Static Standing Fair -   Dynamic Standing Poor +   Ambulatory Poor +   Endurance Deficit   Endurance Deficit Yes   Endurance Deficit Description bp  seated w/c 106/54, standing 104/53, post ambulation 20' 87/52, seated post amb  152/73   Activity Tolerance   Activity Tolerance Treatment limited secondary to medical complications (Comment)  (fluctuations in bp)   Exercises   Balance training  standing trials x2 ,1 minute each with cg/ min assist x1 with BRW,  and dynamic standing with unilateral ue support min assist x1  Assessment   Prognosis Fair   Problem List Decreased strength;Decreased endurance; Impaired balance;Decreased mobility; Decreased skin integrity; Impaired sensation;Obesity; Impaired judgement   Assessment Pt seen for PT treatment session this date with interventions consisting of transfer training and gait training, and education provided as needed for safety and direction to improve functional mobility, safety awareness, and activity tolerance  Pt agreeable to PT treatment session upon arrival, pt found standing at bedside with PCA   At end of session, pt left seated out of bed in recliner with all needs in reach  In comparison to previous session, pt with improvement in activity tolerance, endurance, standing balance, ambulation distances and AM- pac score  Pt progressed to min assist x1 for transfers and ambulation with chair follow  Progressed with ambulation distances to 40' with min assist x1  Pt continues to have drops in BP with ambulation to 96/51 and 87/52 pt rebounds to 149/77 and 152/73 seated post ambulation static standing bp 104/53 (+) dizziness reported and fatigue  Please refer to endurance deficit section of flowsheet for vitals  Pt is very motivated for PT and rehab in order to maximize functional outcomes and independence for safe d/c to home  Continue to recommend STR at time of d/c in order to maximize pt's functional independence and safety w/ mobility  Pt continues to be functioning below baseline level  PT will continue to see pt while here in order to address the deficits listed above and provide interventions consistent w/ POC in effort to achieve STGs      The patient's AM-PAC Basic Mobility Inpatient Short Form Raw Score is 16  A raw score less than 16 suggests the patient may benefit from discharge to post-acute rehabilitation services  Please also refer to the recommendation of the Physical Therapist for safe discharge planning  Goals   Patient Goals to go to rehab to get stronger   STG Expiration Date 05/03/23   PT Treatment Day 3   Plan   Treatment/Interventions Functional transfer training; Therapeutic exercise; Endurance training;Equipment eval/education;Patient/family training;Gait training;Spoke to nursing;OT   Progress Progressing toward goals   PT Frequency 3-5x/wk   Recommendation   PT Discharge Recommendation Post acute rehabilitation services   AM-PAC Basic Mobility Inpatient   Turning in Flat Bed Without Bedrails 3   Lying on Back to Sitting on Edge of Flat Bed Without Bedrails 3   Moving Bed to Chair 3   Standing Up From Chair Using Arms 3   Walk in Room 3   Climb 3-5 Stairs With Railing 1   Basic Mobility Inpatient Raw Score 16   Basic Mobility Standardized Score 38 32   Highest Level Of Mobility   JH-HLM Goal 5: Stand one or more mins   JH-HLM Achieved 7: Walk 25 feet or more   Education   Education Provided Mobility training;Home exercise program;Assistive device   Patient Demonstrates acceptance/verbal understanding   End of Consult   Patient Position at End of Consult Bedside chair; All needs within reach     [place], [place]

## 2023-04-28 NOTE — ASSESSMENT & PLAN NOTE
Wt Readings from Last 3 Encounters:   04/28/23 127 kg (279 lb 8 7 oz)   09/09/21 127 kg (280 lb)   06/09/21 127 kg (280 lb)     · Pt with h/o chronic diastolic chf  · Most recent 2D echo 2/2020: Normal left ventricular ejection fraction  Grade 1 diastolic dysfunction    · Clinically euvolemic   Continue lasix 40mg bid

## 2023-04-28 NOTE — PLAN OF CARE
Problem: PHYSICAL THERAPY ADULT  Goal: Performs mobility at highest level of function for planned discharge setting  See evaluation for individualized goals  Description: Treatment/Interventions: Functional transfer training, LE strengthening/ROM, Elevations, Therapeutic exercise, Endurance training, Patient/family training, Bed mobility, Gait training, Spoke to nursing, OT  Equipment Recommended: Fina Favorite, Wheelchair (pt has)       See flowsheet documentation for full assessment, interventions and recommendations  Outcome: Progressing  Note: Prognosis: Fair  Problem List: Decreased strength, Decreased endurance, Impaired balance, Decreased mobility, Decreased skin integrity, Impaired sensation, Obesity, Impaired judgement  Assessment: Pt seen for PT treatment session this date with interventions consisting of transfer training and gait training, and education provided as needed for safety and direction to improve functional mobility, safety awareness, and activity tolerance  Pt agreeable to PT treatment session upon arrival, pt found standing at bedside with PCA   At end of session, pt left seated out of bed in recliner with all needs in reach  In comparison to previous session, pt with improvement in activity tolerance, endurance, standing balance, ambulation distances and AM- pac score  Pt progressed to min assist x1 for transfers and ambulation with chair follow  Progressed with ambulation distances to 40' with min assist x1  Pt continues to have drops in BP with ambulation to 96/51 and 87/52 pt rebounds to 149/77 and 152/73 seated post ambulation static standing bp 104/53 (+) dizziness reported and fatigue  Please refer to endurance deficit section of flowsheet for vitals  Pt is very motivated for PT and rehab in order to maximize functional outcomes and independence for safe d/c to home  Continue to recommend STR at time of d/c in order to maximize pt's functional independence and safety w/ mobility   Pt continues to be functioning below baseline level  PT will continue to see pt while here in order to address the deficits listed above and provide interventions consistent w/ POC in effort to achieve STGs  The patient's AM-PAC Basic Mobility Inpatient Short Form Raw Score is 16  A raw score less than 16 suggests the patient may benefit from discharge to post-acute rehabilitation services  Please also refer to the recommendation of the Physical Therapist for safe discharge planning  Barriers to Discharge: Inaccessible home environment, Decreased caregiver support  Barriers to Discharge Comments: VITOR; home alone during the day  PT Discharge Recommendation: Post acute rehabilitation services    See flowsheet documentation for full assessment

## 2023-04-28 NOTE — ASSESSMENT & PLAN NOTE
History of orthostatic hypotension in which pt was on florinef and midodrine  Positive orthostatic vitals: 104- 87  Will start low dose midodrine of 2 5mg as he is 264 systolic at times with sitting

## 2023-04-29 LAB
ANION GAP SERPL CALCULATED.3IONS-SCNC: 5 MMOL/L (ref 4–13)
BUN SERPL-MCNC: 20 MG/DL (ref 5–25)
CALCIUM SERPL-MCNC: 8.8 MG/DL (ref 8.4–10.2)
CHLORIDE SERPL-SCNC: 95 MMOL/L (ref 96–108)
CO2 SERPL-SCNC: 34 MMOL/L (ref 21–32)
CREAT SERPL-MCNC: 1.05 MG/DL (ref 0.6–1.3)
GFR SERPL CREATININE-BSD FRML MDRD: 75 ML/MIN/1.73SQ M
GLUCOSE SERPL-MCNC: 129 MG/DL (ref 65–140)
GLUCOSE SERPL-MCNC: 146 MG/DL (ref 65–140)
GLUCOSE SERPL-MCNC: 160 MG/DL (ref 65–140)
GLUCOSE SERPL-MCNC: 179 MG/DL (ref 65–140)
GLUCOSE SERPL-MCNC: 212 MG/DL (ref 65–140)
POTASSIUM SERPL-SCNC: 4 MMOL/L (ref 3.5–5.3)
SODIUM SERPL-SCNC: 134 MMOL/L (ref 135–147)

## 2023-04-29 RX ADMIN — MIDODRINE HYDROCHLORIDE 2.5 MG: 2.5 TABLET ORAL at 17:50

## 2023-04-29 RX ADMIN — ATORVASTATIN CALCIUM 80 MG: 80 TABLET, FILM COATED ORAL at 17:50

## 2023-04-29 RX ADMIN — FUROSEMIDE 40 MG: 40 TABLET ORAL at 08:38

## 2023-04-29 RX ADMIN — FUROSEMIDE 40 MG: 40 TABLET ORAL at 17:50

## 2023-04-29 RX ADMIN — PANTOPRAZOLE SODIUM 40 MG: 40 TABLET, DELAYED RELEASE ORAL at 07:35

## 2023-04-29 RX ADMIN — FINASTERIDE 5 MG: 5 TABLET, FILM COATED ORAL at 08:38

## 2023-04-29 RX ADMIN — INSULIN GLARGINE 40 UNITS: 100 INJECTION, SOLUTION SUBCUTANEOUS at 08:38

## 2023-04-29 RX ADMIN — INSULIN LISPRO 4 UNITS: 100 INJECTION, SOLUTION INTRAVENOUS; SUBCUTANEOUS at 12:08

## 2023-04-29 RX ADMIN — ASPIRIN 81 MG: 81 TABLET, COATED ORAL at 08:38

## 2023-04-29 RX ADMIN — INSULIN LISPRO 2 UNITS: 100 INJECTION, SOLUTION INTRAVENOUS; SUBCUTANEOUS at 22:38

## 2023-04-29 RX ADMIN — INSULIN LISPRO 2 UNITS: 100 INJECTION, SOLUTION INTRAVENOUS; SUBCUTANEOUS at 17:51

## 2023-04-29 RX ADMIN — MIDODRINE HYDROCHLORIDE 2.5 MG: 2.5 TABLET ORAL at 07:35

## 2023-04-29 RX ADMIN — INSULIN GLARGINE 40 UNITS: 100 INJECTION, SOLUTION SUBCUTANEOUS at 22:37

## 2023-04-29 RX ADMIN — MIDODRINE HYDROCHLORIDE 2.5 MG: 2.5 TABLET ORAL at 12:08

## 2023-04-29 RX ADMIN — TAMSULOSIN HYDROCHLORIDE 0.4 MG: 0.4 CAPSULE ORAL at 17:50

## 2023-04-29 RX ADMIN — CLOPIDOGREL BISULFATE 75 MG: 75 TABLET ORAL at 08:38

## 2023-04-29 NOTE — ASSESSMENT & PLAN NOTE
History of orthostatic hypotension in which pt was on florinef and midodrine  Positive orthostatic vitals: 104- 87  Continue low dose midodrine of 2 5mg as he is 556 systolic at times with sitting

## 2023-04-29 NOTE — PROGRESS NOTES
"[facility]  Progress Note  Name: Juan R Enamorado I  MRN: [id number]  Unit/Bed#: E5 -01 I Date of Admission: 4/17/2023   Date of Service: 4/29/2023 I Hospital Day: 12    Assessment/Plan   Fracture, toe  Assessment & Plan  · Patient suffered an injury to his right first toe when he \"stubbed it\"  · X-ray revealed \"nondisplaced fracture, terminal tuft of the right 1st toe  \"  · Patient was eval to podiatry team: No current intervention required  Continue wound care with Betadine    CAD (coronary artery disease)  Assessment & Plan  · Patient has coronary artery disease with prior CABG   · continue aspirin, statin, plavix  · No evidence of acute ischemia  · Continue outpatient follow-up    Diabetic autonomic neuropathy associated with type 2 diabetes mellitus ([facility] [address] )  Assessment & Plan  Lab Results   Component Value Date    HGBA1C 6 7 (A) 02/02/2023     · Patient has diabetes type 2, with long-term use of insulin, with associated peripheral arterial disease and neuropathy   · Continue modified insulin regimen   · Home regimen was:  80 units BID  · Due to hypoglycemia his current insulin regimen is Lantus 40 units every 12 hours  · Continue sliding scale, algorithm 4  · improved glycemic control      Chronic diastolic heart failure (HCC)  Assessment & Plan  Wt Readings from Last 3 Encounters:   04/29/23 126 kg (277 lb 1 9 oz)   09/09/21 127 kg (280 lb)   06/09/21 127 kg (280 lb)     · Pt with h/o chronic diastolic chf  · Most recent 2D echo 2/2020: Normal left ventricular ejection fraction  Grade 1 diastolic dysfunction    · Clinically euvolemic   Continue lasix 40mg bid    Class 2 severe obesity due to excess calories with serious comorbidity and body mass index (BMI) of 35 0 to 35 9 in adult [facility])  Assessment & Plan  BMI 36  Dietary counseling, lifestyle modification    Diabetic gastroparesis (HCC)  Assessment & Plan  Continue PPI once daily  cont reglan 5mg TID PRN with meals  Recommend " small frequent meals  Pt tolerating po    Orthostatic hypotension  Assessment & Plan  History of orthostatic hypotension in which pt was on florinef and midodrine  Positive orthostatic vitals: 104- 87  Continue low dose midodrine of 2 5mg as he is 614 systolic at times with sitting  PAD (peripheral artery disease) ([address] )  Assessment & Plan  · Patient has a history of peripheral arterial disease  · History of previous left BKA in 2018  · Right lower extremity peripheral arterial disease:  · LEADS with right 50-70% proximal popliteal stenosis  · Patient was evaluated by the vascular surgery team  · Continue aspirin, Plavix, statin  · No current vascular intervention indicated: Vascular surgery notes patient is at high risk of limb loss given extent of injury at his right ankle    Currently has adequate healing potential based on his leads  · Outpatient follow-up  · Medically ready for discharge: Awaiting short-term rehab bed    Hyperlipidemia  Assessment & Plan  Continue statin    * Cellulitis of right ankle  Assessment & Plan  Presents with RLE foot wound, compression stocking tourniquet around R ankle with injury down to exposed tendon, cellulitic changes     Pt was followed by the vascular surgery and podiatry teams   Initial concern for osteomyelitis versus cellulitis   MRI showing diffuse subcutaneous edema without definitive osteomyelitis   Wound cx growing GNRs pantoea species susceptible to cephalosporin   LEAD study shows diffuse disease of right lower extremity, but per vascular surgery has adequate flow for healing potential   no vascular intervention at this time, patient with high risk of limb loss in the future   Podiatry recommending local wound care, will need close outpatient follow up   Completed course of antibiotics    Patient is medically ready for discharge: Awaiting rehab bed             VTE Pharmacologic Prophylaxis: lovenox     Mechanical VTE Prophylaxis in Place: Yes    Education and Discussions with Family / Patient: patient    Current Length of Stay: 12 day(s)  Current Patient Status: Inpatient     Discharge Plan / Estimated Discharge Date: awaiting str  Code Status: Level 1 - Full Code      Subjective:   Pt seen and examined  Pt is doing well  No f/c no cp no sob no n/v/d no abd pain  Objective:     Vitals:   Temp (24hrs), Av °F (36 7 °C), Min:97 8 °F (36 6 °C), Max:98 5 °F (36 9 °C)    Temp:  [97 8 °F (36 6 °C)-98 5 °F (36 9 °C)] 98 5 °F (36 9 °C)  HR:  [74-76] 76  Resp:  [17-19] 17  BP: (106-126)/(50-61) 106/50  SpO2:  [99 %] 99 %  Body mass index is 36 56 kg/m²  Input and Output Summary (last 24 hours):     No intake or output data in the 24 hours ending 23 1444    Physical Exam:   Physical Exam  Constitutional:       Appearance: Normal appearance  HENT:      Head: Normocephalic and atraumatic  Eyes:      Extraocular Movements: Extraocular movements intact  Pupils: Pupils are equal, round, and reactive to light  Cardiovascular:      Rate and Rhythm: Normal rate and regular rhythm  Heart sounds: No murmur heard  No friction rub  No gallop  Pulmonary:      Effort: Pulmonary effort is normal  No respiratory distress  Breath sounds: Normal breath sounds  No wheezing, rhonchi or rales  Abdominal:      General: Bowel sounds are normal  There is no distension  Palpations: Abdomen is soft  Tenderness: There is no abdominal tenderness  There is no guarding  Neurological:      Mental Status: He is alert and oriented to person, place, and time            Additional Data:     Labs:  Results from last 7 days   Lab Units 23  0807   WBC Thousand/uL 9 03   HEMOGLOBIN g/dL 12 4   HEMATOCRIT % 39 0   PLATELETS Thousands/uL 279   NEUTROS PCT % 72   LYMPHS PCT % 18   MONOS PCT % 6   EOS PCT % 3     Results from last 7 days   Lab Units 23  0516   SODIUM mmol/L 134*   POTASSIUM mmol/L 4 0   CHLORIDE mmol/L 95*   CO2 mmol/L 34*   BUN mg/dL 20   CREATININE mg/dL 1 05   ANION GAP mmol/L 5   CALCIUM mg/dL 8 8   GLUCOSE RANDOM mg/dL 146*         Results from last 7 days   Lab Units 04/29/23  1135 04/29/23  0732 04/28/23  2038 04/28/23  1559 04/28/23  1107 04/28/23  0727 04/27/23  2045 04/27/23  1542 04/27/23  1053 04/27/23  0722 04/26/23  2111 04/26/23  1730   POC GLUCOSE mg/dl 212* 129 213* 163* 265* 146* 231* 203* 221* 159* 174* 209*               Recent Cultures (last 7 days):           Lines/Drains:  Invasive Devices     None               Last 24 Hours Medication List:   Current Facility-Administered Medications   Medication Dose Route Frequency Provider Last Rate    acetaminophen  650 mg Oral Q6H PRN Damian Peers, PA-C      aluminum-magnesium hydroxide-simethicone  30 mL Oral Q6H PRN Damian Peers, PA-C      aspirin  81 mg Oral Daily Damian Peers, PA-C      atorvastatin  80 mg Oral After Lucrezia Dandy, PA-C      clopidogrel  75 mg Oral Daily Damian Peers, PA-C      enoxaparin  40 mg Subcutaneous Daily Damian Peers, PA-C      finasteride  5 mg Oral Daily Damian Peers, PA-C      furosemide  40 mg Oral BID Damian Peers, PA-C      insulin glargine  40 Units Subcutaneous Q12H [address] Karma Comanche, PA-C      insulin lispro  2-12 Units Subcutaneous 4x Daily (AC & HS) Damian Peers, PAMARIBELL      metoclopramide  10 mg Oral TID PRN Dixon Lewis MD      midodrine  2 5 mg Oral TID AC Marybeth Woodward DO      pantoprazole  40 mg Oral Early Morning Damian Peers, PA-C      polyethylene glycol  17 g Oral Daily Damian Peers, NUNO      tamsulosin  0 4 mg Oral Daily With Lucrezia Dandy, PA-C          Today, Patient Was Seen By: Sorin Diego DO

## 2023-04-29 NOTE — PLAN OF CARE
Problem: MOBILITY - ADULT  Goal: Maintain or return to baseline ADL function  Description: INTERVENTIONS:  -  Assess patient's ability to carry out ADLs; assess patient's baseline for ADL function and identify physical deficits which impact ability to perform ADLs (bathing, care of mouth/teeth, toileting, grooming, dressing, etc )  - Assess/evaluate cause of self-care deficits   - Assess range of motion  - Assess patient's mobility; develop plan if impaired  - Assess patient's need for assistive devices and provide as appropriate  - Encourage maximum independence but intervene and supervise when necessary  - Involve family in performance of ADLs  - Assess for home care needs following discharge   - Consider OT consult to assist with ADL evaluation and planning for discharge  - Provide patient education as appropriate  Outcome: Progressing  Goal: Maintains/Returns to pre admission functional level  Description: INTERVENTIONS:  - Perform BMAT or MOVE assessment daily    - Set and communicate daily mobility goal to care team and patient/family/caregiver     - Collaborate with rehabilitation services on mobility goals if consulted  - Out of bed for toileting  - Record patient progress and toleration of activity level   Outcome: Progressing     Problem: PAIN - ADULT  Goal: Verbalizes/displays adequate comfort level or baseline comfort level  Description: Interventions:  - Encourage patient to monitor pain and request assistance  - Assess pain using appropriate pain scale  - Administer analgesics based on type and severity of pain and evaluate response  - Implement non-pharmacological measures as appropriate and evaluate response  - Consider cultural and social influences on pain and pain management  - Notify physician/advanced practitioner if interventions unsuccessful or patient reports new pain  Outcome: Progressing     Problem: INFECTION - ADULT  Goal: Absence or prevention of progression during hospitalization  Description: INTERVENTIONS:  - Assess and monitor for signs and symptoms of infection  - Monitor lab/diagnostic results  - Monitor all insertion sites, i e  indwelling lines, tubes, and drains  - Monitor endotracheal if appropriate and nasal secretions for changes in amount and color  - [place] appropriate cooling/warming therapies per order  - Administer medications as ordered  - Instruct and encourage patient and family to use good hand hygiene technique  - Identify and instruct in appropriate isolation precautions for identified infection/condition  Outcome: Progressing     Problem: DISCHARGE PLANNING  Goal: Discharge to home or other facility with appropriate resources  Description: INTERVENTIONS:  - Identify barriers to discharge w/patient and caregiver  - Arrange for needed discharge resources and transportation as appropriate  - Identify discharge learning needs (meds, wound care, etc )  - Arrange for interpretive services to assist at discharge as needed  - Refer to Case Management Department for coordinating discharge planning if the patient needs post-hospital services based on physician/advanced practitioner order or complex needs related to functional status, cognitive ability, or social support system  Outcome: Progressing     Problem: Prexisting or High Potential for Compromised Skin Integrity  Goal: Skin integrity is maintained or improved  Description: INTERVENTIONS:  - Identify patients at risk for skin breakdown  - Assess and monitor skin integrity  - Assess and monitor nutrition and hydration status  - Monitor labs   - Assess for incontinence   - Turn and reposition patient  - Assist with mobility/ambulation  - Relieve pressure over bony prominences  - Avoid friction and shearing  - Provide appropriate hygiene as needed including keeping skin clean and dry  - Evaluate need for skin moisturizer/barrier cream  - Collaborate with interdisciplinary team   - Patient/family teaching  - Consider wound care consult   Outcome: Progressing

## 2023-04-29 NOTE — PLAN OF CARE
Problem: MOBILITY - ADULT  Goal: Maintain or return to baseline ADL function  Description: INTERVENTIONS:  -  Assess patient's ability to carry out ADLs; assess patient's baseline for ADL function and identify physical deficits which impact ability to perform ADLs (bathing, care of mouth/teeth, toileting, grooming, dressing, etc )  - Assess/evaluate cause of self-care deficits   - Assess range of motion  - Assess patient's mobility; develop plan if impaired  - Assess patient's need for assistive devices and provide as appropriate  - Encourage maximum independence but intervene and supervise when necessary  - Involve family in performance of ADLs  - Assess for home care needs following discharge   - Consider OT consult to assist with ADL evaluation and planning for discharge  - Provide patient education as appropriate  Outcome: Progressing  Goal: Maintains/Returns to pre admission functional level  Description: INTERVENTIONS:  - Perform BMAT or MOVE assessment daily    - Set and communicate daily mobility goal to care team and patient/family/caregiver     - Collaborate with rehabilitation services on mobility goals if consulted  - Out of bed for toileting  - Record patient progress and toleration of activity level   Outcome: Progressing     Problem: PAIN - ADULT  Goal: Verbalizes/displays adequate comfort level or baseline comfort level  Description: Interventions:  - Encourage patient to monitor pain and request assistance  - Assess pain using appropriate pain scale  - Administer analgesics based on type and severity of pain and evaluate response  - Implement non-pharmacological measures as appropriate and evaluate response  - Consider cultural and social influences on pain and pain management  - Notify physician/advanced practitioner if interventions unsuccessful or patient reports new pain  Outcome: Progressing     Problem: INFECTION - ADULT  Goal: Absence or prevention of progression during hospitalization  Description: INTERVENTIONS:  - Assess and monitor for signs and symptoms of infection  - Monitor lab/diagnostic results  - Monitor all insertion sites, i e  indwelling lines, tubes, and drains  - Monitor endotracheal if appropriate and nasal secretions for changes in amount and color  - [place] appropriate cooling/warming therapies per order  - Administer medications as ordered  - Instruct and encourage patient and family to use good hand hygiene technique  - Identify and instruct in appropriate isolation precautions for identified infection/condition  Outcome: Progressing     Problem: DISCHARGE PLANNING  Goal: Discharge to home or other facility with appropriate resources  Description: INTERVENTIONS:  - Identify barriers to discharge w/patient and caregiver  - Arrange for needed discharge resources and transportation as appropriate  - Identify discharge learning needs (meds, wound care, etc )  - Arrange for interpretive services to assist at discharge as needed  - Refer to Case Management Department for coordinating discharge planning if the patient needs post-hospital services based on physician/advanced practitioner order or complex needs related to functional status, cognitive ability, or social support system  Outcome: Progressing     Problem: Prexisting or High Potential for Compromised Skin Integrity  Goal: Skin integrity is maintained or improved  Description: INTERVENTIONS:  - Identify patients at risk for skin breakdown  - Assess and monitor skin integrity  - Assess and monitor nutrition and hydration status  - Monitor labs   - Assess for incontinence   - Turn and reposition patient  - Assist with mobility/ambulation  - Relieve pressure over bony prominences  - Avoid friction and shearing  - Provide appropriate hygiene as needed including keeping skin clean and dry  - Evaluate need for skin moisturizer/barrier cream  - Collaborate with interdisciplinary team   - Patient/family teaching  - Consider wound care consult   Outcome: Progressing

## 2023-04-29 NOTE — CASE MANAGEMENT
Case Management Discharge Planning Note    Patient name Hans Gomes  Location [address] [address] /E5 [place] [phone]-* MRN [id number]  : 1960 Date 2023       Current Admission Date: 2023  Current Admission Diagnosis:Cellulitis of right ankle   Patient Active Problem List    Diagnosis Date Noted    Fracture, toe 2023    Cellulitis of right ankle 2023    Non-pressure chronic ulcer of right calf with fat layer exposed ([facility] ) 2023    Other acute osteomyelitis, left ankle and foot ([place] [address] ) 10/29/2020    Embolism and thrombosis of arteries of the lower extremities ([place] [address] ) 10/29/2020    Lymphedema of right lower extremity 10/29/2020    Venous stasis dermatitis of right lower extremity 10/29/2020    Abdominal distension 10/29/2020    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    Nodular rash 2020    CAD (coronary artery disease) 2019    Amputated below knee, left ([place] [address] ) 10/04/2018    Phantom limb syndrome without pain ([place] [address] ) 10/04/2018    Obstructive sleep apnea 2018    Hyponatremia 2018    Diabetic autonomic neuropathy associated with type 2 diabetes mellitus ([place] [address] ) 2018    S/P CABG x 3 2018    Other constipation 2018    Chronic diastolic heart failure ([place] [address] ) 2018    Hypertensive heart disease with congestive heart failure (HCC)     Triple vessel coronary artery disease 2018    Diabetic gastroparesis ([place] [address] ) 2018    Class 2 severe obesity due to excess calories with serious comorbidity and body mass index (BMI) of 35 0 to 35 9 in adult ([place] [address] ) 2018    Orthostatic hypotension 2018    PAD (peripheral artery disease) ([place] [address] ) 2018    S/P BKA (below knee amputation), left ([place] [address] ) 2018    Anxiety and depression 2017    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 2017    Vitamin D deficiency 2016    Hyperlipidemia 2015    Diabetic neuropathy, "type II diabetes mellitus ([facility] [address] ) 11/06/2014      LOS (days): 12  Geometric Mean LOS (GMLOS) (days): 3 00  Days to GMLOS:-8 7     OBJECTIVE:  Risk of Unplanned Readmission Score: 11 06         Current admission status: Inpatient   Preferred Pharmacy:   [facility] [address]EREIRA [address] [address]  Phone: [phone] Fax: [phone]    [facility] ([facility],   [address] [place] [address]  [address] [place] [id number]  Phone: [phone] Fax: [phone]    [facility] ([facility] ) - [address],[facility]  [address]  Phone: [phone] Fax: [phone]    Primary Care Provider: Kristin Slater DO    Primary Insurance: [facility] RE[facility]  Secondary Insurance:     DISCHARGE DETAILS:      Additional Comments: CM placed tc to [address] [place] [address] ([phone]) to check status of auth  Rep states the request for acute rehab is \"in the physician's queue\"  CM continues to follow              "

## 2023-04-29 NOTE — ASSESSMENT & PLAN NOTE
Wt Readings from Last 3 Encounters:   04/29/23 126 kg (277 lb 1 9 oz)   09/09/21 127 kg (280 lb)   06/09/21 127 kg (280 lb)     · Pt with h/o chronic diastolic chf  · Most recent 2D echo 2/2020: Normal left ventricular ejection fraction  Grade 1 diastolic dysfunction    · Clinically euvolemic   Continue lasix 40mg bid

## 2023-04-30 LAB
CREAT UR-MCNC: 78.3 MG/DL
GLUCOSE SERPL-MCNC: 109 MG/DL (ref 65–140)
GLUCOSE SERPL-MCNC: 149 MG/DL (ref 65–140)
GLUCOSE SERPL-MCNC: 178 MG/DL (ref 65–140)
GLUCOSE SERPL-MCNC: 233 MG/DL (ref 65–140)
MICROALBUMIN UR-MCNC: 16.2 MG/L (ref 0–20)
MICROALBUMIN/CREAT 24H UR: 21 MG/G CREATININE (ref 0–30)

## 2023-04-30 RX ADMIN — MIDODRINE HYDROCHLORIDE 2.5 MG: 2.5 TABLET ORAL at 05:41

## 2023-04-30 RX ADMIN — INSULIN GLARGINE 40 UNITS: 100 INJECTION, SOLUTION SUBCUTANEOUS at 08:52

## 2023-04-30 RX ADMIN — INSULIN LISPRO 4 UNITS: 100 INJECTION, SOLUTION INTRAVENOUS; SUBCUTANEOUS at 16:54

## 2023-04-30 RX ADMIN — INSULIN GLARGINE 40 UNITS: 100 INJECTION, SOLUTION SUBCUTANEOUS at 21:52

## 2023-04-30 RX ADMIN — CLOPIDOGREL BISULFATE 75 MG: 75 TABLET ORAL at 08:52

## 2023-04-30 RX ADMIN — PANTOPRAZOLE SODIUM 40 MG: 40 TABLET, DELAYED RELEASE ORAL at 05:41

## 2023-04-30 RX ADMIN — MIDODRINE HYDROCHLORIDE 2.5 MG: 2.5 TABLET ORAL at 16:54

## 2023-04-30 RX ADMIN — TAMSULOSIN HYDROCHLORIDE 0.4 MG: 0.4 CAPSULE ORAL at 16:54

## 2023-04-30 RX ADMIN — FINASTERIDE 5 MG: 5 TABLET, FILM COATED ORAL at 08:52

## 2023-04-30 RX ADMIN — ASPIRIN 81 MG: 81 TABLET, COATED ORAL at 08:52

## 2023-04-30 RX ADMIN — INSULIN LISPRO 2 UNITS: 100 INJECTION, SOLUTION INTRAVENOUS; SUBCUTANEOUS at 21:52

## 2023-04-30 RX ADMIN — FUROSEMIDE 40 MG: 40 TABLET ORAL at 17:08

## 2023-04-30 RX ADMIN — ATORVASTATIN CALCIUM 80 MG: 80 TABLET, FILM COATED ORAL at 17:08

## 2023-04-30 RX ADMIN — MIDODRINE HYDROCHLORIDE 2.5 MG: 2.5 TABLET ORAL at 11:56

## 2023-04-30 RX ADMIN — FUROSEMIDE 40 MG: 40 TABLET ORAL at 08:52

## 2023-04-30 NOTE — ASSESSMENT & PLAN NOTE
Presents with RLE foot wound, compression stocking tourniquet around R ankle with injury down to exposed tendon, cellulitic changes     Pt was followed by the vascular surgery and podiatry teams   MRI showing diffuse subcutaneous edema without definitive osteomyelitis   Wound cx growing GNRs pantoea species susceptible to cephalosporin   LEAD study shows diffuse disease of right lower extremity, but per vascular surgery has adequate flow for healing potential   no vascular intervention at this time, patient with high risk of limb loss in the future   Podiatry recommending local wound care, will need close outpatient follow up   Completed course of antibiotics    Patient is medically ready for discharge: Awaiting rehab bed

## 2023-04-30 NOTE — ASSESSMENT & PLAN NOTE
Wt Readings from Last 3 Encounters:   04/30/23 127 kg (279 lb 5 2 oz)   09/09/21 127 kg (280 lb)   06/09/21 127 kg (280 lb)     · Pt with h/o chronic diastolic chf  · Most recent 2D echo 2/2020: Normal left ventricular ejection fraction  Grade 1 diastolic dysfunction    · Clinically euvolemic   Continue lasix 40mg bid

## 2023-04-30 NOTE — ASSESSMENT & PLAN NOTE
Continue PPI once daily  cont reglan 5mg TID PRN with meals  Recommend small frequent meals  Currently tolerating advanced diet

## 2023-04-30 NOTE — PROGRESS NOTES
"[facility]  Progress Note  Name: Ekta Ramirez I  MRN: [id number]  Unit/Bed#: E5 -01 I Date of Admission: 4/17/2023   Date of Service: 4/30/2023 I Hospital Day: 13    Assessment/Plan   Fracture, toe  Assessment & Plan  · Patient suffered an injury to his right first toe when he \"stubbed it\"  · X-ray revealed \"nondisplaced fracture, terminal tuft of the right 1st toe  \"  · Patient was eval to podiatry team: No current intervention required  Continue wound care with Betadine    CAD (coronary artery disease)  Assessment & Plan  · Patient has coronary artery disease with prior CABG   · continue aspirin, statin, plavix  · No evidence of acute ischemia  · Continue outpatient follow-up    Diabetic autonomic neuropathy associated with type 2 diabetes mellitus ([address] )  Assessment & Plan  Lab Results   Component Value Date    HGBA1C 6 7 (A) 02/02/2023     · Patient has diabetes type 2, with long-term use of insulin, with associated peripheral arterial disease and neuropathy   · Continue modified insulin regimen   · Home regimen was:  80 units BID  · Due to hypoglycemia his current insulin regimen is Lantus 40 units every 12 hours  · Continue sliding scale, algorithm 4  · improved glycemic control      Chronic diastolic heart failure (HCC)  Assessment & Plan  Wt Readings from Last 3 Encounters:   04/30/23 127 kg (279 lb 5 2 oz)   09/09/21 127 kg (280 lb)   06/09/21 127 kg (280 lb)     · Pt with h/o chronic diastolic chf  · Most recent 2D echo 2/2020: Normal left ventricular ejection fraction  Grade 1 diastolic dysfunction    · Clinically euvolemic   Continue lasix 40mg bid    Class 2 severe obesity due to excess calories with serious comorbidity and body mass index (BMI) of 35 0 to 35 9 in adult [facility])  Assessment & Plan  BMI 36  Dietary counseling, lifestyle modification    Diabetic gastroparesis (HCC)  Assessment & Plan  Continue PPI once daily  cont reglan 5mg TID PRN with meals  Recommend " small frequent meals  Currently tolerating advanced diet     Orthostatic hypotension  Assessment & Plan  History of orthostatic hypotension in which pt was on florinef and midodrine  Positive orthostatic vitals: 104- 87  Continue low dose midodrine of 2 5mg as he is 813 systolic at times with sitting  PAD (peripheral artery disease) ([facility] [address] )  Assessment & Plan  · Patient has a history of peripheral arterial disease  · History of previous left BKA in 2018  · Right lower extremity peripheral arterial disease:  · LEADS with right 50-70% proximal popliteal stenosis  · Patient was evaluated by the vascular surgery team  · Continue aspirin, Plavix, statin  · No current vascular intervention indicated: Vascular surgery notes patient is at high risk of limb loss given extent of injury at his right ankle    Currently has adequate healing potential based on his leads  · Outpatient follow-up  · Medically ready for discharge: Awaiting short-term rehab bed    Hyperlipidemia  Assessment & Plan  Continue statin    * Cellulitis of right ankle  Assessment & Plan  Presents with RLE foot wound, compression stocking tourniquet around R ankle with injury down to exposed tendon, cellulitic changes     Pt was followed by the vascular surgery and podiatry teams   MRI showing diffuse subcutaneous edema without definitive osteomyelitis   Wound cx growing GNRs pantoea species susceptible to cephalosporin   LEAD study shows diffuse disease of right lower extremity, but per vascular surgery has adequate flow for healing potential   no vascular intervention at this time, patient with high risk of limb loss in the future   Podiatry recommending local wound care, will need close outpatient follow up   Completed course of antibiotics    Patient is medically ready for discharge: Awaiting rehab bed           VTE Pharmacologic Prophylaxis: lovenox   Education and Discussions with Family / Patient: patient    Current Length of Stay: 15 day(s)  Current Patient Status: Inpatient     Discharge Plan / Estimated Discharge Date: awaiting str  Code Status: Level 1 - Full Code      Subjective:   Pt seen and examined  Pt doing well  No new problems  No f/c no cp no sob no n/v/d no abd pain  Objective:     Vitals:   Temp (24hrs), Av 7 °F (36 5 °C), Min:97 6 °F (36 4 °C), Max:97 8 °F (36 6 °C)    Temp:  [97 6 °F (36 4 °C)-97 8 °F (36 6 °C)] 97 6 °F (36 4 °C)  HR:  [72-76] 72  Resp:  [16-18] 16  BP: (117-145)/(48-65) 139/63  SpO2:  [95 %-97 %] 97 %  Body mass index is 36 85 kg/m²  Input and Output Summary (last 24 hours): Intake/Output Summary (Last 24 hours) at 2023 1313  Last data filed at 2023 0850  Gross per 24 hour   Intake --   Output 450 ml   Net -450 ml       Physical Exam:   Physical Exam  Constitutional:       Appearance: Normal appearance  HENT:      Head: Normocephalic and atraumatic  Eyes:      Extraocular Movements: Extraocular movements intact  Pupils: Pupils are equal, round, and reactive to light  Cardiovascular:      Rate and Rhythm: Normal rate and regular rhythm  Heart sounds: No murmur heard  No friction rub  No gallop  Pulmonary:      Effort: Pulmonary effort is normal  No respiratory distress  Breath sounds: Normal breath sounds  No wheezing, rhonchi or rales  Abdominal:      General: Bowel sounds are normal  There is no distension  Palpations: Abdomen is soft  Tenderness: There is no abdominal tenderness  There is no guarding or rebound  Neurological:      Mental Status: He is alert and oriented to person, place, and time            Additional Data:     Labs:  Results from last 7 days   Lab Units 23  0807   WBC Thousand/uL 9 03   HEMOGLOBIN g/dL 12 4   HEMATOCRIT % 39 0   PLATELETS Thousands/uL 279   NEUTROS PCT % 72   LYMPHS PCT % 18   MONOS PCT % 6   EOS PCT % 3     Results from last 7 days   Lab Units 23  0516   SODIUM mmol/L 134*   POTASSIUM mmol/L 4  0   CHLORIDE mmol/L 95*   CO2 mmol/L 34*   BUN mg/dL 20   CREATININE mg/dL 1 05   ANION GAP mmol/L 5   CALCIUM mg/dL 8 8   GLUCOSE RANDOM mg/dL 146*         Results from last 7 days   Lab Units 04/30/23  1120 04/30/23  0735 04/29/23  2108 04/29/23  1611 04/29/23  1135 04/29/23  0732 04/28/23  2038 04/28/23  1559 04/28/23  1107 04/28/23  0727 04/27/23  2045 04/27/23  1542   POC GLUCOSE mg/dl 149* 109 179* 160* 212* 129 213* 163* 265* 146* 231* 203*               Recent Cultures (last 7 days):           Lines/Drains:  Invasive Devices     None               Last 24 Hours Medication List:   Current Facility-Administered Medications   Medication Dose Route Frequency Provider Last Rate    acetaminophen  650 mg Oral Q6H PRN Damian Peers, PA-C      aluminum-magnesium hydroxide-simethicone  30 mL Oral Q6H PRN Damian Peers, PA-C      aspirin  81 mg Oral Daily Damian Peers, PA-C      atorvastatin  80 mg Oral After Lucrezia Dandy, PA-C      clopidogrel  75 mg Oral Daily Damian Peers, PA-C      enoxaparin  40 mg Subcutaneous Daily Damian Peers, PA-C      finasteride  5 mg Oral Daily Damian Peers, PA-C      furosemide  40 mg Oral BID Damian Peers, PA-C      insulin glargine  40 Units Subcutaneous Q12H [address] Karma Abie, PA-C      insulin lispro  2-12 Units Subcutaneous 4x Daily (AC & HS) Damian Peers, NUNO      metoclopramide  10 mg Oral TID PRN Dixon Lewis MD      midodrine  2 5 mg Oral TID AC Marybeth Woodward DO      pantoprazole  40 mg Oral Early Morning Damian Peers, PA-JESSICA      polyethylene glycol  17 g Oral Daily Damian Peers, NUNO      tamsulosin  0 4 mg Oral Daily With Lucrezia Dandy, PA-C          Today, Patient Was Seen By: Sorin Diego DO

## 2023-04-30 NOTE — PLAN OF CARE
Problem: MOBILITY - ADULT  Goal: Maintain or return to baseline ADL function  Description: INTERVENTIONS:  -  Assess patient's ability to carry out ADLs; assess patient's baseline for ADL function and identify physical deficits which impact ability to perform ADLs (bathing, care of mouth/teeth, toileting, grooming, dressing, etc )  - Assess/evaluate cause of self-care deficits   - Assess range of motion  - Assess patient's mobility; develop plan if impaired  - Assess patient's need for assistive devices and provide as appropriate  - Encourage maximum independence but intervene and supervise when necessary  - Involve family in performance of ADLs  - Assess for home care needs following discharge   - Consider OT consult to assist with ADL evaluation and planning for discharge  - Provide patient education as appropriate  Outcome: Progressing  Goal: Maintains/Returns to pre admission functional level  Description: INTERVENTIONS:  - Perform BMAT or MOVE assessment daily    - Set and communicate daily mobility goal to care team and patient/family/caregiver     - Collaborate with rehabilitation services on mobility goals if consulted  - Out of bed for toileting  - Record patient progress and toleration of activity level   Outcome: Progressing     Problem: PAIN - ADULT  Goal: Verbalizes/displays adequate comfort level or baseline comfort level  Description: Interventions:  - Encourage patient to monitor pain and request assistance  - Assess pain using appropriate pain scale  - Administer analgesics based on type and severity of pain and evaluate response  - Implement non-pharmacological measures as appropriate and evaluate response  - Consider cultural and social influences on pain and pain management  - Notify physician/advanced practitioner if interventions unsuccessful or patient reports new pain  Outcome: Progressing     Problem: INFECTION - ADULT  Goal: Absence or prevention of progression during hospitalization  Description: INTERVENTIONS:  - Assess and monitor for signs and symptoms of infection  - Monitor lab/diagnostic results  - Monitor all insertion sites, i e  indwelling lines, tubes, and drains  - Monitor endotracheal if appropriate and nasal secretions for changes in amount and color  - [place] appropriate cooling/warming therapies per order  - Administer medications as ordered  - Instruct and encourage patient and family to use good hand hygiene technique  - Identify and instruct in appropriate isolation precautions for identified infection/condition  Outcome: Progressing     Problem: DISCHARGE PLANNING  Goal: Discharge to home or other facility with appropriate resources  Description: INTERVENTIONS:  - Identify barriers to discharge w/patient and caregiver  - Arrange for needed discharge resources and transportation as appropriate  - Identify discharge learning needs (meds, wound care, etc )  - Arrange for interpretive services to assist at discharge as needed  - Refer to Case Management Department for coordinating discharge planning if the patient needs post-hospital services based on physician/advanced practitioner order or complex needs related to functional status, cognitive ability, or social support system  Outcome: Progressing     Problem: Prexisting or High Potential for Compromised Skin Integrity  Goal: Skin integrity is maintained or improved  Description: INTERVENTIONS:  - Identify patients at risk for skin breakdown  - Assess and monitor skin integrity  - Assess and monitor nutrition and hydration status  - Monitor labs   - Assess for incontinence   - Turn and reposition patient  - Assist with mobility/ambulation  - Relieve pressure over bony prominences  - Avoid friction and shearing  - Provide appropriate hygiene as needed including keeping skin clean and dry  - Evaluate need for skin moisturizer/barrier cream  - Collaborate with interdisciplinary team   - Patient/family teaching  - Consider wound care consult   Outcome: Progressing

## 2023-04-30 NOTE — ASSESSMENT & PLAN NOTE
History of orthostatic hypotension in which pt was on florinef and midodrine  Positive orthostatic vitals: 104- 87  Continue low dose midodrine of 2 5mg as he is 713 systolic at times with sitting

## 2023-04-30 NOTE — PLAN OF CARE
Problem: MOBILITY - ADULT  Goal: Maintain or return to baseline ADL function  Description: INTERVENTIONS:  -  Assess patient's ability to carry out ADLs; assess patient's baseline for ADL function and identify physical deficits which impact ability to perform ADLs (bathing, care of mouth/teeth, toileting, grooming, dressing, etc )  - Assess/evaluate cause of self-care deficits   - Assess range of motion  - Assess patient's mobility; develop plan if impaired  - Assess patient's need for assistive devices and provide as appropriate  - Encourage maximum independence but intervene and supervise when necessary  - Involve family in performance of ADLs  - Assess for home care needs following discharge   - Consider OT consult to assist with ADL evaluation and planning for discharge  - Provide patient education as appropriate  Outcome: Progressing  Goal: Maintains/Returns to pre admission functional level  Description: INTERVENTIONS:  - Perform BMAT or MOVE assessment daily    - Set and communicate daily mobility goal to care team and patient/family/caregiver     - Collaborate with rehabilitation services on mobility goals if consulted  - Out of bed for toileting  - Record patient progress and toleration of activity level   Outcome: Progressing     Problem: PAIN - ADULT  Goal: Verbalizes/displays adequate comfort level or baseline comfort level  Description: Interventions:  - Encourage patient to monitor pain and request assistance  - Assess pain using appropriate pain scale  - Administer analgesics based on type and severity of pain and evaluate response  - Implement non-pharmacological measures as appropriate and evaluate response  - Consider cultural and social influences on pain and pain management  - Notify physician/advanced practitioner if interventions unsuccessful or patient reports new pain  Outcome: Progressing     Problem: INFECTION - ADULT  Goal: Absence or prevention of progression during hospitalization  Description: INTERVENTIONS:  - Assess and monitor for signs and symptoms of infection  - Monitor lab/diagnostic results  - Monitor all insertion sites, i e  indwelling lines, tubes, and drains  - Monitor endotracheal if appropriate and nasal secretions for changes in amount and color  - [place] appropriate cooling/warming therapies per order  - Administer medications as ordered  - Instruct and encourage patient and family to use good hand hygiene technique  - Identify and instruct in appropriate isolation precautions for identified infection/condition  Outcome: Progressing     Problem: DISCHARGE PLANNING  Goal: Discharge to home or other facility with appropriate resources  Description: INTERVENTIONS:  - Identify barriers to discharge w/patient and caregiver  - Arrange for needed discharge resources and transportation as appropriate  - Identify discharge learning needs (meds, wound care, etc )  - Arrange for interpretive services to assist at discharge as needed  - Refer to Case Management Department for coordinating discharge planning if the patient needs post-hospital services based on physician/advanced practitioner order or complex needs related to functional status, cognitive ability, or social support system  Outcome: Progressing     Problem: Prexisting or High Potential for Compromised Skin Integrity  Goal: Skin integrity is maintained or improved  Description: INTERVENTIONS:  - Identify patients at risk for skin breakdown  - Assess and monitor skin integrity  - Assess and monitor nutrition and hydration status  - Monitor labs   - Assess for incontinence   - Turn and reposition patient  - Assist with mobility/ambulation  - Relieve pressure over bony prominences  - Avoid friction and shearing  - Provide appropriate hygiene as needed including keeping skin clean and dry  - Evaluate need for skin moisturizer/barrier cream  - Collaborate with interdisciplinary team   - Patient/family teaching  - Consider wound care consult   Outcome: Progressing

## 2023-05-01 LAB
FLUAV RNA RESP QL NAA+PROBE: NEGATIVE
FLUBV RNA RESP QL NAA+PROBE: NEGATIVE
GLUCOSE SERPL-MCNC: 130 MG/DL (ref 65–140)
GLUCOSE SERPL-MCNC: 201 MG/DL (ref 65–140)
GLUCOSE SERPL-MCNC: 242 MG/DL (ref 65–140)
GLUCOSE SERPL-MCNC: 287 MG/DL (ref 65–140)
RSV RNA RESP QL NAA+PROBE: NEGATIVE
SARS-COV-2 RNA RESP QL NAA+PROBE: NEGATIVE

## 2023-05-01 RX ADMIN — TAMSULOSIN HYDROCHLORIDE 0.4 MG: 0.4 CAPSULE ORAL at 16:25

## 2023-05-01 RX ADMIN — MIDODRINE HYDROCHLORIDE 2.5 MG: 2.5 TABLET ORAL at 11:48

## 2023-05-01 RX ADMIN — INSULIN GLARGINE 40 UNITS: 100 INJECTION, SOLUTION SUBCUTANEOUS at 08:35

## 2023-05-01 RX ADMIN — PANTOPRAZOLE SODIUM 40 MG: 40 TABLET, DELAYED RELEASE ORAL at 06:06

## 2023-05-01 RX ADMIN — ATORVASTATIN CALCIUM 80 MG: 80 TABLET, FILM COATED ORAL at 17:12

## 2023-05-01 RX ADMIN — FUROSEMIDE 40 MG: 40 TABLET ORAL at 08:35

## 2023-05-01 RX ADMIN — CLOPIDOGREL BISULFATE 75 MG: 75 TABLET ORAL at 08:35

## 2023-05-01 RX ADMIN — INSULIN LISPRO 4 UNITS: 100 INJECTION, SOLUTION INTRAVENOUS; SUBCUTANEOUS at 11:48

## 2023-05-01 RX ADMIN — INSULIN LISPRO 4 UNITS: 100 INJECTION, SOLUTION INTRAVENOUS; SUBCUTANEOUS at 16:24

## 2023-05-01 RX ADMIN — MIDODRINE HYDROCHLORIDE 2.5 MG: 2.5 TABLET ORAL at 16:24

## 2023-05-01 RX ADMIN — INSULIN GLARGINE 40 UNITS: 100 INJECTION, SOLUTION SUBCUTANEOUS at 22:55

## 2023-05-01 RX ADMIN — ASPIRIN 81 MG: 81 TABLET, COATED ORAL at 08:35

## 2023-05-01 RX ADMIN — FUROSEMIDE 40 MG: 40 TABLET ORAL at 17:12

## 2023-05-01 RX ADMIN — FINASTERIDE 5 MG: 5 TABLET, FILM COATED ORAL at 08:35

## 2023-05-01 RX ADMIN — MIDODRINE HYDROCHLORIDE 2.5 MG: 2.5 TABLET ORAL at 06:06

## 2023-05-01 RX ADMIN — INSULIN LISPRO 6 UNITS: 100 INJECTION, SOLUTION INTRAVENOUS; SUBCUTANEOUS at 22:55

## 2023-05-01 NOTE — ASSESSMENT & PLAN NOTE
Presents with RLE foot wound, compression stocking tourniquet around R ankle with injury down to exposed tendon, cellulitic changes     Pt was followed by the vascular surgery and podiatry teams   MRI showing diffuse subcutaneous edema without definitive osteomyelitis   Wound cx growing GNRs pantoea species susceptible to cephalosporin   LEAD study shows diffuse disease of right lower extremity, but per vascular surgery has adequate flow for healing potential   no vascular intervention at this time, patient with high risk of limb loss in the future   Podiatry recommending local wound care, will need close outpatient follow up   Completed antibiotics   Medically clear awaiting placement

## 2023-05-01 NOTE — CASE MANAGEMENT
Support Center has received intent to deny     Denial received for: Acute Rehab  Facility: [facility]  Denial #: [id number]  Denial Reason: needs can be met at lower LOC  [employer] phone#: [phone]  Care Manager notified: Jacqueline Sherwood

## 2023-05-01 NOTE — CASE MANAGEMENT
Case Management Discharge Planning Note    Patient name Heather Barber  Location [address] [address] [address] 552/E5 [place] [phone]-* MRN [id number]  : 1960 Date 2023       Current Admission Date: 2023  Current Admission Diagnosis:Cellulitis of right ankle   Patient Active Problem List    Diagnosis Date Noted    Fracture, toe 2023    Cellulitis of right ankle 2023    Non-pressure chronic ulcer of right calf with fat layer exposed ([place] [address] ) 2023    Other acute osteomyelitis, left ankle and foot ([place] [address] ) 10/29/2020    Embolism and thrombosis of arteries of the lower extremities ([place] [address] ) 10/29/2020    Lymphedema of right lower extremity 10/29/2020    Venous stasis dermatitis of right lower extremity 10/29/2020    Abdominal distension 10/29/2020    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    Nodular rash 2020    CAD (coronary artery disease) 2019    Amputated below knee, left ([place] [address] ) 10/04/2018    Phantom limb syndrome without pain ([place] [address] ) 10/04/2018    Obstructive sleep apnea 2018    Hyponatremia 2018    Diabetic autonomic neuropathy associated with type 2 diabetes mellitus ([place] [address] ) 2018    S/P CABG x 3 2018    Other constipation 2018    Chronic diastolic heart failure ([place] [address] ) 2018    Hypertensive heart disease with congestive heart failure (HCC)     Triple vessel coronary artery disease 2018    Diabetic gastroparesis ([place] [address] ) 2018    Class 2 severe obesity due to excess calories with serious comorbidity and body mass index (BMI) of 35 0 to 35 9 in adult ([place] [address] ) 2018    Orthostatic hypotension 2018    PAD (peripheral artery disease) ([place] [address] ) 2018    S/P BKA (below knee amputation), left ([place] [address] ) 2018    Anxiety and depression 2017    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 2017    Vitamin D deficiency 2016    Hyperlipidemia 2015    Diabetic neuropathy, type II diabetes mellitus ([facility] [address] ) 11/06/2014      LOS (days): 14  Geometric Mean LOS (GMLOS) (days): 3 00  Days to GMLOS:-10 4     OBJECTIVE:  Risk of Unplanned Readmission Score: 11 38         Current admission status: Inpatient   Preferred Pharmacy:   [facility] [address] [place], [place] - [address]  [address]  [place] [address]  Phone: [phone] Fax: [phone]    [facility] ([address] [facility],   [address] [place] [address]  Phone: [phone] Fax: [phone]    [facility] ([facility] ) - [address],[facility]  [address]  Phone: [phone] Fax: [phone]    Primary Care Provider: Alex Pena DO    [facility]  Secondary Insurance:     DISCHARGE DETAILS:    Discharge planning discussed with[de-identified] Patient  Freedom of Choice: Yes    Treatment Team Recommendation: Short Term Rehab    Additional Comments: CM received notice that Pt's insurance denied acute rehab  Pt agreeable to back up plan for STR at [facility] Therapy team aware of priority for evals and notes to submit w/auth which is already on standby w/ the CM Discharge Support Team  CM requesting [facility] for today  CM following for discharge to STR

## 2023-05-01 NOTE — ASSESSMENT & PLAN NOTE
Lab Results   Component Value Date    HGBA1C 6 7 (A) 02/02/2023     · Patient has diabetes type 2, with long-term use of insulin, with associated peripheral arterial disease and neuropathy   · Continue modified insulin regimen   · Home regimen was:  80 units BID  · Due to hypoglycemia his current insulin regimen is Lantus 40 units every 12 hours  · Now blood sugar control  · Continue sliding scale

## 2023-05-01 NOTE — PLAN OF CARE
Problem: MOBILITY - ADULT  Goal: Maintain or return to baseline ADL function  Description: INTERVENTIONS:  -  Assess patient's ability to carry out ADLs; assess patient's baseline for ADL function and identify physical deficits which impact ability to perform ADLs (bathing, care of mouth/teeth, toileting, grooming, dressing, etc )  - Assess/evaluate cause of self-care deficits   - Assess range of motion  - Assess patient's mobility; develop plan if impaired  - Assess patient's need for assistive devices and provide as appropriate  - Encourage maximum independence but intervene and supervise when necessary  - Involve family in performance of ADLs  - Assess for home care needs following discharge   - Consider OT consult to assist with ADL evaluation and planning for discharge  - Provide patient education as appropriate  Outcome: Progressing  Goal: Maintains/Returns to pre admission functional level  Description: INTERVENTIONS:  - Perform BMAT or MOVE assessment daily    - Set and communicate daily mobility goal to care team and patient/family/caregiver     - Collaborate with rehabilitation services on mobility goals if consulted  - Out of bed for toileting  - Record patient progress and toleration of activity level   Outcome: Progressing     Problem: PAIN - ADULT  Goal: Verbalizes/displays adequate comfort level or baseline comfort level  Description: Interventions:  - Encourage patient to monitor pain and request assistance  - Assess pain using appropriate pain scale  - Administer analgesics based on type and severity of pain and evaluate response  - Implement non-pharmacological measures as appropriate and evaluate response  - Consider cultural and social influences on pain and pain management  - Notify physician/advanced practitioner if interventions unsuccessful or patient reports new pain  Outcome: Progressing     Problem: INFECTION - ADULT  Goal: Absence or prevention of progression during hospitalization  Description: INTERVENTIONS:  - Assess and monitor for signs and symptoms of infection  - Monitor lab/diagnostic results  - Monitor all insertion sites, i e  indwelling lines, tubes, and drains  - Monitor endotracheal if appropriate and nasal secretions for changes in amount and color  - [place] appropriate cooling/warming therapies per order  - Administer medications as ordered  - Instruct and encourage patient and family to use good hand hygiene technique  - Identify and instruct in appropriate isolation precautions for identified infection/condition  Outcome: Progressing     Problem: DISCHARGE PLANNING  Goal: Discharge to home or other facility with appropriate resources  Description: INTERVENTIONS:  - Identify barriers to discharge w/patient and caregiver  - Arrange for needed discharge resources and transportation as appropriate  - Identify discharge learning needs (meds, wound care, etc )  - Arrange for interpretive services to assist at discharge as needed  - Refer to Case Management Department for coordinating discharge planning if the patient needs post-hospital services based on physician/advanced practitioner order or complex needs related to functional status, cognitive ability, or social support system  Outcome: Progressing     Problem: Prexisting or High Potential for Compromised Skin Integrity  Goal: Skin integrity is maintained or improved  Description: INTERVENTIONS:  - Identify patients at risk for skin breakdown  - Assess and monitor skin integrity  - Assess and monitor nutrition and hydration status  - Monitor labs   - Assess for incontinence   - Turn and reposition patient  - Assist with mobility/ambulation  - Relieve pressure over bony prominences  - Avoid friction and shearing  - Provide appropriate hygiene as needed including keeping skin clean and dry  - Evaluate need for skin moisturizer/barrier cream  - Collaborate with interdisciplinary team   - Patient/family teaching  - Consider wound care consult   Outcome: Progressing

## 2023-05-01 NOTE — PROGRESS NOTES
[address] [place] [address]  Progress Note  Name: Adalgisa Jacobson  MRN: [id number]  Unit/Bed#: E5 -01 I Date of Admission: 4/17/2023   Date of Service: 5/1/2023 I Hospital Day: 14    Assessment/Plan   * Cellulitis of right ankle  Assessment & Plan  Presents with RLE foot wound, compression stocking tourniquet around R ankle with injury down to exposed tendon, cellulitic changes     Pt was followed by the vascular surgery and podiatry teams   MRI showing diffuse subcutaneous edema without definitive osteomyelitis   Wound cx growing GNRs pantoea species susceptible to cephalosporin   LEAD study shows diffuse disease of right lower extremity, but per vascular surgery has adequate flow for healing potential   no vascular intervention at this time, patient with high risk of limb loss in the future   Podiatry recommending local wound care, will need close outpatient follow up   Completed antibiotics   Medically clear awaiting placement    Diabetic autonomic neuropathy associated with type 2 diabetes mellitus ([facility] [address] )  Assessment & Plan  Lab Results   Component Value Date    HGBA1C 6 7 (A) 02/02/2023     · Patient has diabetes type 2, with long-term use of insulin, with associated peripheral arterial disease and neuropathy   · Continue modified insulin regimen   · Home regimen was:  80 units BID  · Due to hypoglycemia his current insulin regimen is Lantus 40 units every 12 hours  · Now blood sugar control  · Continue sliding scale      Chronic diastolic heart failure (HCC)  Assessment & Plan  Wt Readings from Last 3 Encounters:   04/30/23 127 kg (279 lb 5 2 oz)   09/09/21 127 kg (280 lb)   06/09/21 127 kg (280 lb)     · Pt with h/o chronic diastolic chf  · Most recent 2D echo 2/2020: Normal left ventricular ejection fraction  Grade 1 diastolic dysfunction     Remains euvolemic   Continue Lasix    Class 2 severe obesity due to excess calories with serious comorbidity and body mass index (BMI) of 35 0 to 35 9 in adult [facility])  Assessment & Plan  BMI 36  Dietary counseling, lifestyle modification    Diabetic gastroparesis ([facility] [address] )  Assessment & Plan  Continue PPI once daily  Continue Reglan as needed    Orthostatic hypotension  Assessment & Plan  History of orthostatic hypotension in which pt was on florinef and midodrine  Continue midodrine  BP stable    PAD (peripheral artery disease) ([facility] [address] )  Assessment & Plan  · Patient has a history of peripheral arterial disease  · History of previous left BKA in 2018  · Right lower extremity peripheral arterial disease:  · LEADS with right 50-70% proximal popliteal stenosis  · Patient was evaluated by the vascular surgery team  · Continue aspirin, Plavix, statin  · No current vascular intervention indicated:   · will follow-up outpatient with vascular    Hyperlipidemia  Assessment & Plan  Continue statin           VTE Pharmacologic Prophylaxis:   Pharmacologic: Enoxaparin (Lovenox)  Mechanical VTE Prophylaxis in Place: Yes    Patient Centered Rounds: I have performed bedside rounds with nursing staff today  Discussions with Specialists or Other Care Team Provider: cm, nursing    Education and Discussions with Family / Patient: pt    Time Spent for Care: 30 minutes  More than 50% of total time spent on counseling and coordination of care as described above      Current Length of Stay: 14 day(s)    Current Patient Status: Inpatient   Certification Statement: The patient will continue to require additional inpatient hospital stay due to see below    Discharge Plan: Medically clear awaiting placement to rehab    Code Status: Level 1 - Full Code      Subjective:   Currently denies fever, chills, shortness of breath, cough    Objective:     Vitals:   Temp (24hrs), Av 2 °F (36 8 °C), Min:97 9 °F (36 6 °C), Max:98 9 °F (37 2 °C)    Temp:  [97 9 °F (36 6 °C)-98 9 °F (37 2 °C)] 97 9 °F (36 6 °C)  HR:  [69-77] 77  Resp:  [18] 18  BP: ()/(59-68) 133/68  SpO2:  [94 %-96 %] 96 %  Body mass index is 36 85 kg/m²  Input and Output Summary (last 24 hours): Intake/Output Summary (Last 24 hours) at 5/1/2023 0856  Last data filed at 4/30/2023 2100  Gross per 24 hour   Intake --   Output 1800 ml   Net -1800 ml       Physical Exam:     Physical Exam  Constitutional:       General: He is not in acute distress  Appearance: He is well-developed  He is not diaphoretic  HENT:      Head: Normocephalic and atraumatic  Nose: Nose normal       Mouth/Throat:      Pharynx: No oropharyngeal exudate  Eyes:      General: No scleral icterus  Conjunctiva/sclera: Conjunctivae normal    Cardiovascular:      Rate and Rhythm: Normal rate and regular rhythm  Heart sounds: Normal heart sounds  No murmur heard  No friction rub  No gallop  Pulmonary:      Effort: Pulmonary effort is normal  No respiratory distress  Breath sounds: Normal breath sounds  No wheezing or rales  Chest:      Chest wall: No tenderness  Abdominal:      General: Bowel sounds are normal  There is no distension  Palpations: Abdomen is soft  Tenderness: There is no abdominal tenderness  There is no guarding  Musculoskeletal:         General: No tenderness or deformity  Normal range of motion  Cervical back: Normal range of motion and neck supple  Skin:     General: Skin is warm and dry  Findings: No erythema  Neurological:      Mental Status: He is alert  Mental status is at baseline         (  Additional Data:     Labs:        Results from last 7 days   Lab Units 04/29/23  0516   SODIUM mmol/L 134*   POTASSIUM mmol/L 4 0   CHLORIDE mmol/L 95*   CO2 mmol/L 34*   BUN mg/dL 20   CREATININE mg/dL 1 05   ANION GAP mmol/L 5   CALCIUM mg/dL 8 8   GLUCOSE RANDOM mg/dL 146*         Results from last 7 days   Lab Units 05/01/23  0733 04/30/23  2120 04/30/23  1535 04/30/23  1120 04/30/23  0735 04/29/23  2108 04/29/23  1611 04/29/23  1135 04/29/23  0732 04/28/23  2038 04/28/23  1559 04/28/23  1107   POC GLUCOSE mg/dl 130 178* 233* 149* 109 179* 160* 212* 129 213* 163* 265*                   * I Have Reviewed All Lab Data Listed Above  * Additional Pertinent Lab Tests Reviewed: All Labs Within Last 24 Hours Reviewed    Imaging:    Imaging Reports Reviewed Today Include: na  Imaging Personally Reviewed by Myself Includes:  na    Recent Cultures (last 7 days):           Last 24 Hours Medication List:   Current Facility-Administered Medications   Medication Dose Route Frequency Provider Last Rate    acetaminophen  650 mg Oral Q6H PRN Caldwell Hash, PA-C      aluminum-magnesium hydroxide-simethicone  30 mL Oral Q6H PRN [facility], PA-C      aspirin  81 mg Oral Daily [facility], PA-C      atorvastatin  80 mg Oral After [facility], PA-C      clopidogrel  75 mg Oral Daily [facility], PA-C      enoxaparin  40 mg Subcutaneous Daily [facility], PA-C      finasteride  5 mg Oral Daily [facility], PA-C      furosemide  40 mg Oral BID [facility], PA-C      insulin glargine  40 Units Subcutaneous Q12H [address] Rice Dills, PA-C      insulin lispro  2-12 Units Subcutaneous 4x Daily (AC & HS) [facility], PA-C      metoclopramide  10 mg Oral TID PRN Angel Willams MD      midodrine  2 5 mg Oral TID AC Marybeth Woodward DO      pantoprazole  40 mg Oral Early Morning [facility], PA-C      polyethylene glycol  17 g Oral Daily [facility], PA-C      tamsulosin  0 4 mg Oral Daily With Dinner [facility], PAJacobC          Today, Patient Was Seen By: Ino Hensley MD    ** Please Note: Dictation voice to text software may have been used in the creation of this document   **

## 2023-05-01 NOTE — CASE MANAGEMENT
Support Center has received intent to deny     Denial received for: Acute Rehab  [place]  [facility] [place] [address] [facility]  Denial #:  [id number]  Denial Reason: does not meet CMS guidelines  Peer to Peer phone#:  [phone] opt 5     Deadline: 5/1 @ 12 noon  Care Manager notified: Carol Danielle

## 2023-05-01 NOTE — ASSESSMENT & PLAN NOTE
· Patient has a history of peripheral arterial disease  · History of previous left BKA in 2018  · Right lower extremity peripheral arterial disease:  · LEADS with right 50-70% proximal popliteal stenosis  · Patient was evaluated by the vascular surgery team  · Continue aspirin, Plavix, statin  · No current vascular intervention indicated:   · will follow-up outpatient with vascular

## 2023-05-01 NOTE — OCCUPATIONAL THERAPY NOTE
"  Occupational Therapy Progress Note     Patient Name: Eugene Rojas  Today's Date: 5/1/2023  Problem List  Principal Problem:    Cellulitis of right ankle  Active Problems:    Hyperlipidemia    PAD (peripheral artery disease) (HCC)    Orthostatic hypotension    Diabetic gastroparesis (HCC)    Class 2 severe obesity due to excess calories with serious comorbidity and body mass index (BMI) of 35 0 to 35 9 in [facility])    Chronic diastolic heart failure (HCC)    Diabetic autonomic neuropathy associated with type 2 diabetes mellitus ([facility] [address] )    CAD (coronary artery disease)    Fracture, toe       05/01/23 1401   OT Last Visit   OT Visit Date 05/01/23   Note Type   Note Type Treatment for insurance authorization   Pain Assessment   Pain Assessment Tool 0-10   Pain Score No Pain   Restrictions/Precautions   Weight Bearing Precautions Per Order Yes   RLE Weight Bearing Per Order WBAT   Other Precautions Fall Risk  (orthostatic hypotension)   ADL   LB Dressing Assistance 3  Moderate Assistance   LB Dressing Deficit Setup;Verbal cueing;Supervision/safety; Increased time to complete; Don/doff R shoe   Transfers   Sit to Stand 5  Supervision   Additional items Assist x 1; Armrests; Increased time required;Verbal cues; Other  (RW)   Stand to Sit 4  Minimal assistance   Additional items Assist x 1; Armrests; Increased time required;Verbal cues; Other  (controlled descent)   Additional Comments multiple transfers completed today, continues to be very orthostatic during transitions, is symptomatic  Refer to assessment  Functional Mobility   Functional Mobility 4  Minimal assistance   Additional items Rolling walker   Subjective   Subjective \"I feel like I've wasted a week here when I could've been getting 3 hours of therapies a day\"   Cognition   Overall Cognitive Status WFL   Arousal/Participation Alert; Cooperative   Attention Within functional limits   Orientation Level Oriented X4   Memory Within functional limits   Following Commands " Follows one step commands without difficulty   Activity Tolerance   Activity Tolerance Patient limited by fatigue;Treatment limited secondary to medical complications (Comment)  (orthostatic hypotension)   Medical Staff Made Aware YI Gonzalez CM   Assessment   Assessment Pt seen for OT f/u treatment session focusing on functional activity tolerance, bed mobility, ADLs, functional transfers/mobility and Safe transfer techniques  Patient agreeable to OT treatment session, upon arrival patient was found seated OOB to chair, agreeable to OT tx session  Pt continues to make progress towards POC, sit>stands improved to supervision, however continues to require Joan for stand>sit for controlled descent  Continues to experience orthostatic hypotension that significantly impacts his ability to complete functional mobility/transfers and ADLs  Upon entry, pt's /63 sitting in chair, standin/48  He completes transfer to w/c - BP: 126/59, completes functional mobility with RW and S-Joan until pt sits and requests BP to be taken: 76/46, after 1 min 71/38 and then again after 3 minutes: 106/61  Pt requesting to trial steps, assisted PT with stair management given pt's hypotension  BP prior to completing steps: 140/60, post steps - failed reading from dynamap, re-attempted: 97/49  Pt is symptomatic and states he can usually tell when his blood pressure fluctuates  Please refer to flowsheet for functional performance  Patient requiring verbal cues for safety and verbal cues for pacing through activity steps  Patient continues to be functioning below baseline level, occupational performance remains limited secondary to factors listed above and increased risk for falls and injury  Pt seated OOB in chair at end of session  Call bell and phone within reach  All needs met and pt reports no further questions for OT at this time   Continue to recommend STR given pt required Ax2 for stair management, when medically cleared  OT to follow pt on caseload to address deficits to facilitate return to PLOF  Did communicate with RN Dru Riedel and Marc Silver CM re: orthostatic hypotension  Plan   Treatment Interventions ADL retraining;Functional transfer training;UE strengthening/ROM; Endurance training;Patient/family training;Equipment evaluation/education; Compensatory technique education;Continued evaluation; Energy conservation; Activityengagement   Goal Expiration Date 05/03/23   OT Treatment Day 4   OT Frequency 3-5x/wk   Recommendation   OT Discharge Recommendation Post acute rehabilitation services   AM-PAC Daily Activity Inpatient   Lower Body Dressing 2   Bathing 2   Toileting 2   Upper Body Dressing 3   Grooming 3   Eating 4   Daily Activity Raw Score 16   Daily Activity Standardized Score (Calc for Raw Score >=11) 35 96   AM-PAC Applied Cognition Inpatient   Following a Speech/Presentation 4   Understanding Ordinary Conversation 4   Taking Medications 3   Remembering Where Things Are Placed or Put Away 3   Remembering List of 4-5 Errands 3   Taking Care of Complicated Tasks 3   Applied Cognition Raw Score 20   Applied Cognition Standardized Score 41 76     Deidra Reusing

## 2023-05-01 NOTE — PLAN OF CARE
Problem: PHYSICAL THERAPY ADULT  Goal: Performs mobility at highest level of function for planned discharge setting  See evaluation for individualized goals  Description: Treatment/Interventions: Functional transfer training, LE strengthening/ROM, Elevations, Therapeutic exercise, Endurance training, Patient/family training, Bed mobility, Gait training, Spoke to nursing, OT  Equipment Recommended: Edil[employer]to [employer], Wheelchair (pt has)       See flowsheet documentation for full assessment, interventions and recommendations  Outcome: Progressing  Note: Prognosis: Fair  Problem List: Decreased strength, Decreased endurance, Impaired balance, Decreased mobility, Obesity, Decreased skin integrity, Impaired sensation, Impaired judgement  Assessment: Pt seen for PT treatment session this date with interventions consisting of transfer training, gait training and stair training, and education provided as needed for safety and direction to improve functional mobility, safety awareness, and activity tolerance  Pt agreeable to PT treatment session upon arrival, pt found seated out of bed in recliner upon entering, pt offers no c/o pain    At end of session, pt left seated out of bed in recliner post PT session with all needs in reach  In comparison to previous session, pt with improvement in functional mobility  Pt  Progressed to stair climbing performing with mod assist x2 with cues for proper le sequencing and safety with stair climbing  Functional limitations due to orthostatic hypotension and pt being symptomatic,decreased endurance, overall strength, mobility and balance with stair climbing  Please refer to endurance deficit section of flowsheet for vitals  Continue to recommend STR at time of d/c in order to maximize pt's functional independence and safety w/ mobility  Pt continues to be functioning below baseline level   PT will continue to see pt while here in order to address the deficits listed above and provide interventions consistent w/ POC in effort to achieve STGs  Barriers to Discharge: Inaccessible home environment, Decreased caregiver support  Barriers to Discharge Comments: VITOR; home alone during the day  PT Discharge Recommendation: Post acute rehabilitation services    See flowsheet documentation for full assessment

## 2023-05-01 NOTE — CASE MANAGEMENT
Javier [address] 49 received request for authorization from Care Manager  Authorization request for: [facility]  Facility Name: [facility]  NPI: [id number]  Facility MD: Florian Bolaños  NPI: [id number]  Authorization initiated by contacting insurance: Renelda Goldberg: Phone  Pending Reference #: [id number]  Clinicals submitted via:  Fax

## 2023-05-01 NOTE — ASSESSMENT & PLAN NOTE
History of orthostatic hypotension in which pt was on florinef and midodrine  Continue midodrine  BP stable

## 2023-05-01 NOTE — PHYSICAL THERAPY NOTE
PHYSICAL THERAPY NOTE          Patient Name: Sanjuanita TAVAREZZ Date: 5/1/2023 PT treatment time  7050-3115        05/01/23 9625   Note Type   Note Type Treatment for insurance authorization   Pain Assessment   Pain Assessment Tool 0-10   Pain Score No Pain   Restrictions/Precautions   RLE Weight Bearing Per Order WBAT   Other Precautions Fall Risk  (orthostatic hypotension )   General   Chart Reviewed Yes   Cognition   Overall Cognitive Status WFL   Subjective   Subjective The issue is with my Bp  Bed Mobility   Additional Comments pt  out of bed upon arrival, remained out of bed at conclusion of PT session  Transfers   Sit to Stand 5  Supervision   Additional items Assist x 1; Armrests; Increased time required;Verbal cues   Stand to Sit 4  Minimal assistance   Additional items Assist x 1; Armrests; Increased time required;Verbal cues  (cues for ease of descent)   Additional Comments sit <> stand transfers performed x4, orthostatic hypotension (+) lightheadedness refer to endurance section for vitals   Ambulation/Elevation   Gait pattern Improper Weight shift; Forward Flexion; Steppage; Short stride;Decreased heel strike;Decreased toe off;Excessively slow   Gait Assistance 4  Minimal assist  (fluctutates between min- cose supervision cg depending on fatigue and lightheadedness )   Additional items Assist x 1;Verbal cues   Assistive Device Bariatric Rolling walker   Distance 10' x2, 30' x1  chair follow   Stair Management Assistance 3  Moderate assist   Additional items Assist x 2;Verbal cues; Increased time required   Stair Management Technique One rail L;Foreward;Nonreciprocal;Sideways  (two hands on one rail)   Number of Stairs 2   Ambulation/Elevation Additional Comments gait unsteady however no gross lob noted  b/l foot slap with steppage gait and decreased foot clearance with flexed posture, decreased toe off and heel strike  "  Balance   Static Sitting Normal   Dynamic Sitting Good   Static Standing Fair   Dynamic Standing Fair -   Ambulatory Fair -   Endurance Deficit   Endurance Deficit Description bp  seated 148/63,static standing 2 minutes 86/48(+) lightheadedness, seated post standing 2 minutes 126/59, after ambulationg 30' 71/38, seated 3 minutes after amb  30\", seated prioer to stair climbing 140/60 post stair climibng 97/49 spo2 94%-96% on RA hr 75 bpm   Activity Tolerance   Activity Tolerance Treatment limited secondary to medical complications (Comment)  (orthostatic hypotension)   Nurse Made Aware Josias RICHMOND aware   Assessment   Prognosis Fair   Problem List Decreased strength;Decreased endurance; Impaired balance;Decreased mobility;Obesity; Decreased skin integrity; Impaired sensation; Impaired judgement   Assessment Pt seen for PT treatment session this date with interventions consisting of transfer training, gait training and stair training, and education provided as needed for safety and direction to improve functional mobility, safety awareness, and activity tolerance  Pt agreeable to PT treatment session upon arrival, pt found seated out of bed in recliner upon entering, pt offers no c/o pain    At end of session, pt left seated out of bed in recliner post PT session with all needs in reach  In comparison to previous session, pt with improvement in functional mobility  Pt  Progressed to stair climbing performing with mod assist x2 with cues for proper le sequencing and safety with stair climbing  Functional limitations due to orthostatic hypotension and pt being symptomatic,decreased endurance, overall strength, mobility and balance with stair climbing  Please refer to endurance deficit section of flowsheet for vitals  Continue to recommend STR at time of d/c in order to maximize pt's functional independence and safety w/ mobility  Pt continues to be functioning below baseline level   PT will continue to see pt while here " in order to address the deficits listed above and provide interventions consistent w/ POC in effort to achieve STGs  Goals   Patient Goals To go to rehab to get stronger and practice stair climbing to get into my house  STG Expiration Date 05/03/23   PT Treatment Day 4   Plan   Treatment/Interventions Functional transfer training;Elevations; Therapeutic exercise; Endurance training;Patient/family training;Equipment eval/education;Gait training;Spoke to nursing;OT;Spoke to case management   Progress Progressing toward goals   PT Frequency 3-5x/wk   Recommendation   PT Discharge Recommendation Post acute rehabilitation services   Additional Comments 2 1   AM-PAC Basic Mobility Inpatient   Turning in Flat Bed Without Bedrails 3   Lying on Back to Sitting on Edge of Flat Bed Without Bedrails 3   Moving Bed to Chair 3   Standing Up From Chair Using Arms 3   Walk in Room 3   Climb 3-5 Stairs With Railing 2   Basic Mobility Inpatient Raw Score 17   Basic Mobility Standardized Score 39 67   Highest Level Of Mobility   JH-HLM Goal 5: Stand one or more mins   JH-HLM Achieved 7: Walk 25 feet or more   Education   Education Provided Mobility training;Home exercise program;Assistive device   Patient Demonstrates acceptance/verbal understanding   End of Consult   Patient Position at End of Consult Bedside chair; All needs within reach        05/01/23 1542   Note Type   Note Type Treatment for insurance authorization   Pain Assessment   Pain Assessment Tool 0-10   Pain Score No Pain   Restrictions/Precautions   RLE Weight Bearing Per Order WBAT   Other Precautions Fall Risk  (orthostatic hypotension )   General   Chart Reviewed Yes   Cognition   Overall Cognitive Status WFL   Subjective   Subjective The issue is with my Bp  Bed Mobility   Additional Comments pt  out of bed upon arrival, remained out of bed at conclusion of PT session  Transfers   Sit to Stand 5  Supervision   Additional items Assist x 1; Armrests; Increased "time required;Verbal cues   Stand to Sit 4  Minimal assistance   Additional items Assist x 1; Armrests; Increased time required;Verbal cues  (cues for ease of descent)   Additional Comments sit <> stand transfers performed x4, orthostatic hypotension (+) lightheadedness refer to endurance section for vitals   Ambulation/Elevation   Gait pattern Improper Weight shift; Forward Flexion; Steppage; Short stride;Decreased heel strike;Decreased toe off;Excessively slow   Gait Assistance 4  Minimal assist  (fluctutates between min- cose supervision cg depending on fatigue and lightheadedness )   Additional items Assist x 1;Verbal cues   Assistive Device Bariatric Rolling walker   Distance 10' x2, 30' x1  chair follow   Stair Management Assistance 3  Moderate assist   Additional items Assist x 2;Verbal cues; Increased time required   Stair Management Technique One rail L;Foreward;Nonreciprocal;Sideways  (two hands on one rail)   Number of Stairs 2   Ambulation/Elevation Additional Comments gait unsteady however no gross lob noted  b/l foot slap with steppage gait and decreased foot clearance with flexed posture, decreased toe off and heel strike  Balance   Static Sitting Normal   Dynamic Sitting Good   Static Standing Fair   Dynamic Standing Fair -   Ambulatory Fair -   Endurance Deficit   Endurance Deficit Description bp  seated 148/63,static standing 2 minutes 86/48(+) lightheadedness, seated post standing 2 minutes 126/59, after ambulationg 30' 71/38, seated 3 minutes after amb  30\", seated prioer to stair climbing 140/60 post stair climibng 97/49 spo2 94%-96% on RA hr 75 bpm   Activity Tolerance   Activity Tolerance Treatment limited secondary to medical complications (Comment)  (orthostatic hypotension)   Nurse Made Aware Rakesh RICHMOND aware   Assessment   Prognosis Fair   Problem List Decreased strength;Decreased endurance; Impaired balance;Decreased mobility;Obesity; Decreased skin integrity; Impaired sensation; Impaired " judgement   Assessment Pt seen for PT treatment session this date with interventions consisting of transfer training, gait training and stair training, and education provided as needed for safety and direction to improve functional mobility, safety awareness, and activity tolerance  Pt agreeable to PT treatment session upon arrival, pt found seated out of bed in recliner upon entering, pt offers no c/o pain    At end of session, pt left seated out of bed in recliner post PT session with all needs in reach  In comparison to previous session, pt with improvement in functional mobility  Pt  Progressed to stair climbing performing with mod assist x2 with cues for proper le sequencing and safety with stair climbing  Functional limitations due to orthostatic hypotension and pt being symptomatic,decreased endurance, overall strength, mobility and balance with stair climbing  Please refer to endurance deficit section of flowsheet for vitals  Continue to recommend STR at time of d/c in order to maximize pt's functional independence and safety w/ mobility  Pt continues to be functioning below baseline level  PT will continue to see pt while here in order to address the deficits listed above and provide interventions consistent w/ POC in effort to achieve STGs  Goals   Patient Goals To go to rehab to get stronger and practice stair climbing to get into my house  STG Expiration Date 05/03/23   PT Treatment Day 4   Plan   Treatment/Interventions Functional transfer training;Elevations; Therapeutic exercise; Endurance training;Patient/family training;Equipment eval/education;Gait training;Spoke to nursing;OT;Spoke to case management   Progress Progressing toward goals   PT Frequency 3-5x/wk   Recommendation   PT Discharge Recommendation Post acute rehabilitation services   Additional Comments 2 1   AM-PAC Basic Mobility Inpatient   Turning in Flat Bed Without Bedrails 3   Lying on Back to Sitting on Edge of Flat Bed Without Bedrails 3   Moving Bed to Chair 3   Standing Up From Chair Using Arms 3   Walk in Room 3   Climb 3-5 Stairs With Railing 2   Basic Mobility Inpatient Raw Score 17   Basic Mobility Standardized Score 39 67   Highest Level Of Mobility   -HLM Goal 5: Stand one or more mins   -HLM Achieved 7: Walk 25 feet or more   Education   Education Provided Mobility training;Home exercise program;Assistive device   Patient Demonstrates acceptance/verbal understanding   End of Consult   Patient Position at End of Consult Bedside chair; All needs within reach     [place], [place]

## 2023-05-01 NOTE — PLAN OF CARE
Problem: MOBILITY - ADULT  Goal: Maintain or return to baseline ADL function  Description: INTERVENTIONS:  -  Assess patient's ability to carry out ADLs; assess patient's baseline for ADL function and identify physical deficits which impact ability to perform ADLs (bathing, care of mouth/teeth, toileting, grooming, dressing, etc )  - Assess/evaluate cause of self-care deficits   - Assess range of motion  - Assess patient's mobility; develop plan if impaired  - Assess patient's need for assistive devices and provide as appropriate  - Encourage maximum independence but intervene and supervise when necessary  - Involve family in performance of ADLs  - Assess for home care needs following discharge   - Consider OT consult to assist with ADL evaluation and planning for discharge  - Provide patient education as appropriate  Outcome: Progressing  Goal: Maintains/Returns to pre admission functional level  Description: INTERVENTIONS:  - Perform BMAT or MOVE assessment daily    - Set and communicate daily mobility goal to care team and patient/family/caregiver     - Collaborate with rehabilitation services on mobility goals if consulted  - Out of bed for toileting  - Record patient progress and toleration of activity level   Outcome: Progressing     Problem: PAIN - ADULT  Goal: Verbalizes/displays adequate comfort level or baseline comfort level  Description: Interventions:  - Encourage patient to monitor pain and request assistance  - Assess pain using appropriate pain scale  - Administer analgesics based on type and severity of pain and evaluate response  - Implement non-pharmacological measures as appropriate and evaluate response  - Consider cultural and social influences on pain and pain management  - Notify physician/advanced practitioner if interventions unsuccessful or patient reports new pain  Outcome: Progressing     Problem: INFECTION - ADULT  Goal: Absence or prevention of progression during hospitalization  Description: INTERVENTIONS:  - Assess and monitor for signs and symptoms of infection  - Monitor lab/diagnostic results  - Monitor all insertion sites, i e  indwelling lines, tubes, and drains  - Monitor endotracheal if appropriate and nasal secretions for changes in amount and color  - [place] appropriate cooling/warming therapies per order  - Administer medications as ordered  - Instruct and encourage patient and family to use good hand hygiene technique  - Identify and instruct in appropriate isolation precautions for identified infection/condition  Outcome: Progressing     Problem: DISCHARGE PLANNING  Goal: Discharge to home or other facility with appropriate resources  Description: INTERVENTIONS:  - Identify barriers to discharge w/patient and caregiver  - Arrange for needed discharge resources and transportation as appropriate  - Identify discharge learning needs (meds, wound care, etc )  - Arrange for interpretive services to assist at discharge as needed  - Refer to Case Management Department for coordinating discharge planning if the patient needs post-hospital services based on physician/advanced practitioner order or complex needs related to functional status, cognitive ability, or social support system  Outcome: Progressing     Problem: Prexisting or High Potential for Compromised Skin Integrity  Goal: Skin integrity is maintained or improved  Description: INTERVENTIONS:  - Identify patients at risk for skin breakdown  - Assess and monitor skin integrity  - Assess and monitor nutrition and hydration status  - Monitor labs   - Assess for incontinence   - Turn and reposition patient  - Assist with mobility/ambulation  - Relieve pressure over bony prominences  - Avoid friction and shearing  - Provide appropriate hygiene as needed including keeping skin clean and dry  - Evaluate need for skin moisturizer/barrier cream  - Collaborate with interdisciplinary team   - Patient/family teaching  - Consider wound care consult   Outcome: Progressing

## 2023-05-01 NOTE — PLAN OF CARE
Problem: OCCUPATIONAL THERAPY ADULT  Goal: Performs self-care activities at highest level of function for planned discharge setting  See evaluation for individualized goals  Description: Treatment Interventions: ADL retraining, UE strengthening/ROM, Functional transfer training, Endurance training, Cognitive reorientation, Patient/family training, Equipment evaluation/education, Compensatory technique education, Energy conservation, Activityengagement          See flowsheet documentation for full assessment, interventions and recommendations  Outcome: Progressing  Note: Limitation: Decreased ADL status, Decreased UE strength, Decreased UE ROM, Decreased Safe judgement during ADL, Decreased sensation, Decreased endurance, Decreased self-care trans, Decreased high-level ADLs  Prognosis: Fair  Assessment: Pt seen for OT f/u treatment session focusing on functional activity tolerance, bed mobility, ADLs, functional transfers/mobility and Safe transfer techniques  Patient agreeable to OT treatment session, upon arrival patient was found seated OOB to chair, agreeable to OT tx session  Pt continues to make progress towards POC, sit>stands improved to supervision, however continues to require Joan for stand>sit for controlled descent  Continues to experience orthostatic hypotension that significantly impacts his ability to complete functional mobility/transfers and ADLs  Upon entry, pt's /63 sitting in chair, standin/48  He completes transfer to w/c - BP: 126/59, completes functional mobility with RW and S-Joan until pt sits and requests BP to be taken: 76/46, after 1 min 71/38 and then again after 3 minutes: 106/61  Pt requesting to trial steps, assisted PT with stair management given pt's hypotension  BP prior to completing steps: 140/60, post steps - failed reading from dynamap, re-attempted: 97/49  Pt is symptomatic and states he can usually tell when his blood pressure fluctuates   Please refer to flowsheet for functional performance  Patient requiring verbal cues for safety and verbal cues for pacing through activity steps  Patient continues to be functioning below baseline level, occupational performance remains limited secondary to factors listed above and increased risk for falls and injury  Pt seated OOB in chair at end of session  Call bell and phone within reach  All needs met and pt reports no further questions for OT at this time  Continue to recommend STR given pt required Ax2 for stair management, when medically cleared  OT to follow pt on caseload to address deficits to facilitate return to PLOF  Did communicate with RN Agapito Becerra and Debbie Hightower CM re: orthostatic hypotension       OT Discharge Recommendation: Post acute rehabilitation services

## 2023-05-01 NOTE — ASSESSMENT & PLAN NOTE
Wt Readings from Last 3 Encounters:   04/30/23 127 kg (279 lb 5 2 oz)   09/09/21 127 kg (280 lb)   06/09/21 127 kg (280 lb)     · Pt with h/o chronic diastolic chf  · Most recent 2D echo 2/2020: Normal left ventricular ejection fraction  Grade 1 diastolic dysfunction     Remains euvolemic   Continue Lasix

## 2023-05-02 VITALS
HEART RATE: 66 BPM | WEIGHT: 276.9 LBS | SYSTOLIC BLOOD PRESSURE: 127 MMHG | DIASTOLIC BLOOD PRESSURE: 63 MMHG | HEIGHT: 73 IN | RESPIRATION RATE: 16 BRPM | OXYGEN SATURATION: 98 % | TEMPERATURE: 97.6 F | BODY MASS INDEX: 36.7 KG/M2

## 2023-05-02 LAB
ANION GAP SERPL CALCULATED.3IONS-SCNC: 8 MMOL/L (ref 4–13)
BASOPHILS # BLD AUTO: 0.08 THOUSANDS/ΜL (ref 0–0.1)
BASOPHILS NFR BLD AUTO: 1 % (ref 0–1)
BUN SERPL-MCNC: 17 MG/DL (ref 5–25)
CALCIUM SERPL-MCNC: 8.9 MG/DL (ref 8.4–10.2)
CHLORIDE SERPL-SCNC: 97 MMOL/L (ref 96–108)
CO2 SERPL-SCNC: 29 MMOL/L (ref 21–32)
CREAT SERPL-MCNC: 0.95 MG/DL (ref 0.6–1.3)
EOSINOPHIL # BLD AUTO: 0.28 THOUSAND/ΜL (ref 0–0.61)
EOSINOPHIL NFR BLD AUTO: 3 % (ref 0–6)
ERYTHROCYTE [DISTWIDTH] IN BLOOD BY AUTOMATED COUNT: 13.6 % (ref 11.6–15.1)
GFR SERPL CREATININE-BSD FRML MDRD: 84 ML/MIN/1.73SQ M
GLUCOSE SERPL-MCNC: 123 MG/DL (ref 65–140)
GLUCOSE SERPL-MCNC: 136 MG/DL (ref 65–140)
GLUCOSE SERPL-MCNC: 186 MG/DL (ref 65–140)
HCT VFR BLD AUTO: 38.7 % (ref 36.5–49.3)
HGB BLD-MCNC: 12.3 G/DL (ref 12–17)
IMM GRANULOCYTES # BLD AUTO: 0.03 THOUSAND/UL (ref 0–0.2)
IMM GRANULOCYTES NFR BLD AUTO: 0 % (ref 0–2)
LYMPHOCYTES # BLD AUTO: 2.06 THOUSANDS/ΜL (ref 0.6–4.47)
LYMPHOCYTES NFR BLD AUTO: 20 % (ref 14–44)
MCH RBC QN AUTO: 27.5 PG (ref 26.8–34.3)
MCHC RBC AUTO-ENTMCNC: 31.8 G/DL (ref 31.4–37.4)
MCV RBC AUTO: 87 FL (ref 82–98)
MONOCYTES # BLD AUTO: 0.65 THOUSAND/ΜL (ref 0.17–1.22)
MONOCYTES NFR BLD AUTO: 6 % (ref 4–12)
NEUTROPHILS # BLD AUTO: 7.13 THOUSANDS/ΜL (ref 1.85–7.62)
NEUTS SEG NFR BLD AUTO: 70 % (ref 43–75)
NRBC BLD AUTO-RTO: 0 /100 WBCS
PLATELET # BLD AUTO: 257 THOUSANDS/UL (ref 149–390)
PMV BLD AUTO: 9.7 FL (ref 8.9–12.7)
POTASSIUM SERPL-SCNC: 4 MMOL/L (ref 3.5–5.3)
RBC # BLD AUTO: 4.47 MILLION/UL (ref 3.88–5.62)
SODIUM SERPL-SCNC: 134 MMOL/L (ref 135–147)
WBC # BLD AUTO: 10.23 THOUSAND/UL (ref 4.31–10.16)

## 2023-05-02 RX ORDER — MIDODRINE HYDROCHLORIDE 2.5 MG/1
2.5 TABLET ORAL
Refills: 0
Start: 2023-05-02

## 2023-05-02 RX ORDER — INSULIN GLARGINE 100 [IU]/ML
40 INJECTION, SOLUTION SUBCUTANEOUS 2 TIMES DAILY
Qty: 135 ML | Refills: 0
Start: 2023-05-02

## 2023-05-02 RX ADMIN — FINASTERIDE 5 MG: 5 TABLET, FILM COATED ORAL at 08:18

## 2023-05-02 RX ADMIN — INSULIN LISPRO 2 UNITS: 100 INJECTION, SOLUTION INTRAVENOUS; SUBCUTANEOUS at 11:26

## 2023-05-02 RX ADMIN — INSULIN GLARGINE 40 UNITS: 100 INJECTION, SOLUTION SUBCUTANEOUS at 08:17

## 2023-05-02 RX ADMIN — CLOPIDOGREL BISULFATE 75 MG: 75 TABLET ORAL at 08:18

## 2023-05-02 RX ADMIN — ASPIRIN 81 MG: 81 TABLET, COATED ORAL at 08:18

## 2023-05-02 RX ADMIN — PANTOPRAZOLE SODIUM 40 MG: 40 TABLET, DELAYED RELEASE ORAL at 06:46

## 2023-05-02 RX ADMIN — MIDODRINE HYDROCHLORIDE 2.5 MG: 2.5 TABLET ORAL at 06:46

## 2023-05-02 RX ADMIN — FUROSEMIDE 40 MG: 40 TABLET ORAL at 08:18

## 2023-05-02 RX ADMIN — MIDODRINE HYDROCHLORIDE 2.5 MG: 2.5 TABLET ORAL at 11:26

## 2023-05-02 NOTE — DISCHARGE INSTR - AVS FIRST PAGE
Discharge Instructions - Podiatry    Weight Bearing Status: Weight bearing as tolerated RLE                   Pain: Continue analgesics as directed    Follow-up appointment instructions: Please make an appointment within one week of discharge with Dr Felton Reveal  Contact sooner if any increase in pain, or signs of infection occur    Wound Care: Keep dressings clean, dry, and intact between professional dressing changes  If dressing get wet, soiled or removed please replace it    Nursing Instructions: Please apply Maxorb, DSD to ankle wound  Betadine to the right great toe  Wrap with continuous even compression dressing from the base of toes to the tibial tuberosity  Wash the right lower extremity with NSS or wound wash when changing the dressing  Dressing should be changed daily due to drainage

## 2023-05-02 NOTE — CASE MANAGEMENT
Case Management Discharge Planning Note    Patient name Bolivar Alas  Location [address] [address] /E5 [place] [phone] [id number] MRN [id number]  : 1960 Date 2023       Current Admission Date: 2023  Current Admission Diagnosis:Cellulitis of right ankle   Patient Active Problem List    Diagnosis Date Noted    Fracture, toe 2023    Cellulitis of right ankle 2023    Non-pressure chronic ulcer of right calf with fat layer exposed ([place] [address] ) 2023    Other acute osteomyelitis, left ankle and foot ([place] [address] ) 10/29/2020    Embolism and thrombosis of arteries of the lower extremities ([place] [address] ) 10/29/2020    Lymphedema of right lower extremity 10/29/2020    Venous stasis dermatitis of right lower extremity 10/29/2020    Abdominal distension 10/29/2020    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    Nodular rash 2020    CAD (coronary artery disease) 2019    Amputated below knee, left ([place] [address] ) 10/04/2018    Phantom limb syndrome without pain ([place] [address] ) 10/04/2018    Obstructive sleep apnea 2018    Hyponatremia 2018    Diabetic autonomic neuropathy associated with type 2 diabetes mellitus ([place] [address] ) 2018    S/P CABG x 3 2018    Other constipation 2018    Chronic diastolic heart failure ([place] [address] ) 2018    Hypertensive heart disease with congestive heart failure (HCC)     Triple vessel coronary artery disease 2018    Diabetic gastroparesis ([place] [address] ) 2018    Class 2 severe obesity due to excess calories with serious comorbidity and body mass index (BMI) of 35 0 to 35 9 in adult ([place] [address] ) 2018    Orthostatic hypotension 2018    PAD (peripheral artery disease) ([place] [address] ) 2018    S/P BKA (below knee amputation), left ([place] [address] ) 2018    Anxiety and depression 2017    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 2017    Vitamin D deficiency 2016    Hyperlipidemia 2015    Diabetic neuropathy, type II diabetes mellitus ([facility] [address] ) 11/06/2014      LOS (days): 15  Geometric Mean LOS (GMLOS) (days): 3 00  Days to GMLOS:-11 5     OBJECTIVE:  Risk of Unplanned Readmission Score: 11 55         Current admission status: Inpatient   Preferred Pharmacy:   [address] [place] [address]  Phone: [phone] Fax: [phone]    [facility] ([facility],   [address] [address]  Phone: [phone] Fax: [phone]    [facility] ([facility] ) - [address],[facility]  [address]  Phone: [phone] Fax: [phone]    Primary Care Provider: Marjorie Hatfield DO    Primary Insurance: Samantha Yuan [facility] REP  Secondary Insurance:     [address] [place] [address] Avenue Number: [id number]

## 2023-05-02 NOTE — ASSESSMENT & PLAN NOTE
Presents with RLE foot wound, compression stocking tourniquet around R ankle with injury down to exposed tendon, cellulitic changes     Pt was followed by the vascular surgery and podiatry teams   MRI showing diffuse subcutaneous edema without definitive osteomyelitis   Wound cx growing GNRs pantoea species susceptible to cephalosporin   LEAD study shows diffuse disease of right lower extremity, but per vascular surgery has adequate flow for healing potential   no vascular intervention at this time, patient with high risk of limb loss in the future   Podiatry recommending local wound care, will need close outpatient follow up   Completed antibiotics   Discharged to [facility]

## 2023-05-02 NOTE — DISCHARGE SUMMARY
[address] [facility]  Discharge- [facility] 1960, 60 y o  male MRN: [id number]  Unit/Bed#: E5 -01 Encounter: [id number]  Primary Care Provider: Hilda Sebastian DO   Date and time admitted to hospital: 4/17/2023  8:45 PM    Diabetic autonomic neuropathy associated with type 2 diabetes mellitus [facility])  Assessment & Plan  Lab Results   Component Value Date    HGBA1C 6 7 (A) 02/02/2023     · Patient has diabetes type 2, with long-term use of insulin, with associated peripheral arterial disease and neuropathy   · Continue modified insulin regimen   · Home regimen was:  80 units BID  · Due to hypoglycemia his current insulin regimen is Lantus 40 units every 12 hours  · Now blood sugar control  · Continue sliding scale      Chronic diastolic heart failure (HCC)  Assessment & Plan  Wt Readings from Last 3 Encounters:   05/02/23 126 kg (276 lb 14 4 oz)   09/09/21 127 kg (280 lb)   06/09/21 127 kg (280 lb)     · Pt with h/o chronic diastolic chf  · Most recent 2D echo 2/2020: Normal left ventricular ejection fraction  Grade 1 diastolic dysfunction     Remains euvolemic   Continue Lasix    Class 2 severe obesity due to excess calories with serious comorbidity and body mass index (BMI) of 35 0 to 35 9 in adult [facility])  Assessment & Plan  BMI 36  Dietary counseling, lifestyle modification    Diabetic gastroparesis (HCC)  Assessment & Plan  Continue PPI once daily  Continue Reglan as needed    Orthostatic hypotension  Assessment & Plan  History of orthostatic hypotension in which pt was on florinef and midodrine  Continue midodrine  BP stable    PAD (peripheral artery disease) ([facility] [address] )  Assessment & Plan  · Patient has a history of peripheral arterial disease  · History of previous left BKA in 2018  · Right lower extremity peripheral arterial disease:  · LEADS with right 50-70% proximal popliteal stenosis  · Patient was evaluated by the vascular surgery team  · Continue aspirin, Plavix, statin  · No current vascular intervention indicated:   · will follow-up outpatient with vascular    Hyperlipidemia  Assessment & Plan  Continue statin    * Cellulitis of right ankle  Assessment & Plan  Presents with RLE foot wound, compression stocking tourniquet around R ankle with injury down to exposed tendon, cellulitic changes     Pt was followed by the vascular surgery and podiatry teams   MRI showing diffuse subcutaneous edema without definitive osteomyelitis   Wound cx growing GNRs pantoea species susceptible to cephalosporin   LEAD study shows diffuse disease of right lower extremity, but per vascular surgery has adequate flow for healing potential   no vascular intervention at this time, patient with high risk of limb loss in the future   Podiatry recommending local wound care, will need close outpatient follow up   Completed antibiotics   Discharged to [place] [address] Physician / Practitioner: Michelle Shane MD  PCP: Ras Chaney DO  Admission Date:   Admission Orders (From admission, onward)     Ordered        04/17/23 2233  [facility] [place]  Once                      Discharge Date: 05/02/23    Medical Problems     Resolved Problems  Date Reviewed: 5/2/2023   None         Consultations During Hospital Stay:  · Vascular  · Podiatry    Procedures Performed:   · none    Significant Findings / Test Results:   XR ankle 3+ views RIGHT    Result Date: 4/18/2023  Impression: No acute osseous abnormality  Workstation performed: [id number]     XR foot 3+ views RIGHT    Result Date: 4/18/2023  Impression: Nondisplaced fracture, terminal tuft of the right 1st toe  Findings concur with the referring clinician's preliminary interpretation already in the patient's electronic health record  Workstation performed: [id number]     MRI ankle/heel right wo contrast    Result Date: 4/20/2023  · Impression: Severe subcutaneous edema with ulceration seen medially as above   There is mild marrow edema along the medial malleolus, nonspecific with differential considerations as above  No confluent decreased T1 marrow signal to definitively suggest osteomyelitis at this time  Workstation performed: [id number]   · VAS Lower Limb LEAD studies 04/18  CONCLUSION:  Impression:  RIGHT LOWER LIMB:  There is a 50-75% stenosis in the proximal popliteal artery, and diffuse  atherosclerotic arterial disease throughout the remaining portions of the  femoropopliteal vessels  Unable to visualize the calf vessels due to  depth/edema/induration  Ankle/Brachial index: Unable to obtain due to ulcer/bandaging and induration  (Prior0  95, normal range )  Metatarsal pressure of 123 mm Hg  2nd digit pressure of 73 mm Hg, within the healing range  PVR/ PPG tracings are dampened  LEFT LOWER LIMB:  BKA     Compared to previous study on 8/12/2019, there is no significant change in  disease  Recommend repeat duplex in 1 year to monitor for plaque progression  Incidental Findings:   · none     Test Results Pending at Discharge (will require follow up):   · none     Outpatient Tests Requested:  · none    Complications:  none    Reason for Admission: RLE wound    Hospital Course:     Rodney Wright is a 60 y o  male patient who originally presented to the hospital on 4/17/2023 due to right lower extremity wounds concerning for infection  MRI imaging was negative for osteomyelitis, though noted edema and ulceration  Podiatry and vascular surgery evaluated  Lead study completed showing that patient within healing range  Vascular surgery stated that patient is high risk for limb loss  Continue aspirin, statin  Completed course of antibiotics while inpatient for cellulitis  Patient discharged to short-term rehab  Will need close follow-up outpatient with podiatry for wound care  Please see above list of diagnoses and related plan for additional information       Condition at Discharge: fair     Discharge Day Visit / Exam:     Subjective: "  Patient reports doing well, tolerating diet  Denies any chest pain, fevers, chills or nausea or vomiting  Vitals: Blood Pressure: 127/63 (05/02/23 0727)  Pulse: 66 (05/02/23 0727)  Temperature: 97 6 °F (36 4 °C) (05/02/23 0727)  Temp Source: Oral (05/02/23 0727)  Respirations: 16 (05/02/23 0727)  Height: 6' 1\" (185 4 cm) (04/17/23 2240)  Weight - Scale: 126 kg (276 lb 14 4 oz) (05/02/23 0600)  SpO2: 98 % (05/02/23 0727)  Exam:   Physical Exam  Vitals and nursing note reviewed  Constitutional:       General: He is not in acute distress  Appearance: He is well-developed  He is obese  HENT:      Head: Normocephalic and atraumatic  Eyes:      General: No scleral icterus  Conjunctiva/sclera: Conjunctivae normal    Cardiovascular:      Rate and Rhythm: Normal rate and regular rhythm  Pulmonary:      Effort: Pulmonary effort is normal  No respiratory distress  Abdominal:      Palpations: Abdomen is soft  Tenderness: There is no abdominal tenderness  Musculoskeletal:         General: No swelling  Comments: RL lymphedema, dry skin, exposed tendon   Skin:     General: Skin is warm and dry  Neurological:      Mental Status: He is alert  Mental status is at baseline  Psychiatric:         Mood and Affect: Mood normal        Discharge instructions/Information to patient and family:   See after visit summary for information provided to patient and family  Provisions for Follow-Up Care:  See after visit summary for information related to follow-up care and any pertinent home health orders  Disposition:     Home    Planned Readmission: none     Discharge Statement:  I spent 40 minutes discharging the patient  This time was spent on the day of discharge  I had direct contact with the patient on the day of discharge   Greater than 50% of the total time was spent examining patient, answering all patient questions, arranging and discussing plan of care with patient as well as directly " providing post-discharge instructions  Additional time then spent on discharge activities  Discharge Medications:  See after visit summary for reconciled discharge medications provided to patient and family        ** Please Note: This note has been constructed using a voice recognition system **

## 2023-05-02 NOTE — PROGRESS NOTES
"[facility] Podiatry - Progress Note  Patient: Kecia Burris 60 y o  male   MRN: [id number]  PCP: Niko Bose DO  Unit/Bed#: E5 -01 Encounter: [id number]  Date Of Visit: 23  Hospital Stay Days: 15    ASSESSMENT:    Kecia Burris is a 60 y o  male with:    1  Right ankle wound with cellulitis  2  Right great toe wound secondary to injury  3  H/o left BKA  4  PAD  5  Uncontrolled Type 2 Diabetes  6  RLE lymphedema       PLAN:    · Patient wound is stable  Working on rehab placement  · Dressing orders in chart  · Discharge wound care orders placed  · Patient will need to follow-up in wound care  · Discussed extensively importance of elevation of limb  · Rest of care per primary team    Weight bearing status: As tolerated      SUBJECTIVE:     The patient was seen, evaluated, and assessed at bedside today  The patient was awake, alert, and in no acute distress  No acute events overnight  The patient reports feeling well for the most part    Patient denies N/V/F/chills/SOB/CP  In discussion with patient about taking control of his care and calling and setting up appointments with wound care etc   Patient says that he is willing to try and get follow-up as he does not want to lose his right leg as well  OBJECTIVE:     Vitals:   /63 (BP Location: Left arm)   Pulse 66   Temp 97 6 °F (36 4 °C) (Oral)   Resp 16   Ht 6' 1\" (1 854 m)   Wt 126 kg (276 lb 14 4 oz)   SpO2 98%   BMI 36 53 kg/m²     Temp (24hrs), Av 8 °F (36 6 °C), Min:97 6 °F (36 4 °C), Max:98 °F (36 7 °C)      Physical Exam :     General:  Alert, cooperative, and in no distress  Lower extremity exam:  Neurovascular, musculoskeletal status at baseline    Dermatologic: Negative for erythema, discharge, malodor, pain  There is significant lymphedema surrounding wound to the right ankle as well as the great toe  Wound #: 1 located on the left medial ankle measuring approximately 8 0 x 2 0 x 0 1 cm deepest tissue being fascia    " Negative probe to bone with out granulation tissue  Negative for drainage negative for signs of infection  Wound located on the dorsum of the right great toe measuring approximately 1 0 x 0 8 x 0 1 cm  Negative for signs of infection  Clinical Images 05/02/23:              Additional Data:     Labs:    Results from last 7 days   Lab Units 05/02/23  0511   WBC Thousand/uL 10 23*   HEMOGLOBIN g/dL 12 3   HEMATOCRIT % 38 7   PLATELETS Thousands/uL 257   NEUTROS PCT % 70   LYMPHS PCT % 20   MONOS PCT % 6   EOS PCT % 3     Results from last 7 days   Lab Units 05/02/23  0511   POTASSIUM mmol/L 4 0   CHLORIDE mmol/L 97   CO2 mmol/L 29   BUN mg/dL 17   CREATININE mg/dL 0 95   CALCIUM mg/dL 8 9           * I Have Reviewed All Lab Data Listed Above  Recent Cultures (last 7 days):               Imaging: I have personally reviewed pertinent films in PACS  XR ankle 3+ views RIGHT    Result Date: 4/18/2023  Impression: No acute osseous abnormality  Workstation performed: [id number]     XR foot 3+ views RIGHT    Result Date: 4/18/2023  Impression: Nondisplaced fracture, terminal tuft of the right 1st toe  Findings concur with the referring clinician's preliminary interpretation already in the patient's electronic health record  Workstation performed: [id number]     MRI ankle/heel right wo contrast    Result Date: 4/20/2023  Impression: Severe subcutaneous edema with ulceration seen medially as above  There is mild marrow edema along the medial malleolus, nonspecific with differential considerations as above  No confluent decreased T1 marrow signal to definitively suggest osteomyelitis at this time  Workstation performed: [id number]     EKG, Pathology, and Other Studies: I have personally reviewed pertinent reports      Active medications:  Current Facility-Administered Medications   Medication Dose Route Frequency    acetaminophen (TYLENOL) tablet 650 mg  650 mg Oral Q6H PRN    aluminum-magnesium "hydroxide-simethicone (MYLANTA) oral suspension 30 mL  30 mL Oral Q6H PRN    aspirin (ECOTRIN LOW STRENGTH) EC tablet 81 mg  81 mg Oral Daily    atorvastatin (LIPITOR) tablet 80 mg  80 mg Oral After Dinner    clopidogrel (PLAVIX) tablet 75 mg  75 mg Oral Daily    enoxaparin (LOVENOX) subcutaneous injection 40 mg  40 mg Subcutaneous Daily    finasteride (PROSCAR) tablet 5 mg  5 mg Oral Daily    furosemide (LASIX) tablet 40 mg  40 mg Oral BID    insulin glargine (LANTUS) subcutaneous injection 40 Units 0 4 mL  40 Units Subcutaneous Q12H [place]    insulin lispro (HumaLOG) 100 units/mL subcutaneous injection 2-12 Units  2-12 Units Subcutaneous 4x Daily (AC & HS)    metoclopramide (REGLAN) tablet 10 mg  10 mg Oral TID PRN    midodrine (PROAMATINE) tablet 2 5 mg  2 5 mg Oral TID AC    pantoprazole (PROTONIX) EC tablet 40 mg  40 mg Oral Early Morning    polyethylene glycol (MIRALAX) packet 17 g  17 g Oral Daily    tamsulosin (FLOMAX) capsule 0 4 mg  0 4 mg Oral Daily With Dinner         ** Please Note: Portions of the record may have been created with voice recognition software  Occasional wrong word or \"sound a like\" substitutions may have occurred due to the inherent limitations of voice recognition software  Read the chart carefully and recognize, using context, where substitutions have occurred   **      "

## 2023-05-02 NOTE — ASSESSMENT & PLAN NOTE
Wt Readings from Last 3 Encounters:   05/02/23 126 kg (276 lb 14 4 oz)   09/09/21 127 kg (280 lb)   06/09/21 127 kg (280 lb)     · Pt with h/o chronic diastolic chf  · Most recent 2D echo 2/2020: Normal left ventricular ejection fraction  Grade 1 diastolic dysfunction     Remains euvolemic   Continue Lasix

## 2023-05-02 NOTE — CASE MANAGEMENT
Javier Duncan 49 has received approved authorization from insurance:   [place] in by Rosio Kay P#  [phone]  Authorization received for: SNF  Facility: [facility] #: [id number]  Start of Care: 5/2  Next Review Date: 5/4  Care Coordinator: Keaton Hahn  P#: [phone]  Submit next review to: [phone]   Care Manager notified: Brandon Partida

## 2023-05-02 NOTE — PLAN OF CARE
Problem: MOBILITY - ADULT  Goal: Maintain or return to baseline ADL function  Description: INTERVENTIONS:  -  Assess patient's ability to carry out ADLs; assess patient's baseline for ADL function and identify physical deficits which impact ability to perform ADLs (bathing, care of mouth/teeth, toileting, grooming, dressing, etc )  - Assess/evaluate cause of self-care deficits   - Assess range of motion  - Assess patient's mobility; develop plan if impaired  - Assess patient's need for assistive devices and provide as appropriate  - Encourage maximum independence but intervene and supervise when necessary  - Involve family in performance of ADLs  - Assess for home care needs following discharge   - Consider OT consult to assist with ADL evaluation and planning for discharge  - Provide patient education as appropriate  Outcome: Progressing  Goal: Maintains/Returns to pre admission functional level  Description: INTERVENTIONS:  - Perform BMAT or MOVE assessment daily    - Set and communicate daily mobility goal to care team and patient/family/caregiver     - Collaborate with rehabilitation services on mobility goals if consulted  - Out of bed for toileting  - Record patient progress and toleration of activity level   Outcome: Progressing     Problem: PAIN - ADULT  Goal: Verbalizes/displays adequate comfort level or baseline comfort level  Description: Interventions:  - Encourage patient to monitor pain and request assistance  - Assess pain using appropriate pain scale  - Administer analgesics based on type and severity of pain and evaluate response  - Implement non-pharmacological measures as appropriate and evaluate response  - Consider cultural and social influences on pain and pain management  - Notify physician/advanced practitioner if interventions unsuccessful or patient reports new pain  Outcome: Progressing     Problem: INFECTION - ADULT  Goal: Absence or prevention of progression during hospitalization  Description: INTERVENTIONS:  - Assess and monitor for signs and symptoms of infection  - Monitor lab/diagnostic results  - Monitor all insertion sites, i e  indwelling lines, tubes, and drains  - Monitor endotracheal if appropriate and nasal secretions for changes in amount and color  - [place] appropriate cooling/warming therapies per order  - Administer medications as ordered  - Instruct and encourage patient and family to use good hand hygiene technique  - Identify and instruct in appropriate isolation precautions for identified infection/condition  Outcome: Progressing     Problem: DISCHARGE PLANNING  Goal: Discharge to home or other facility with appropriate resources  Description: INTERVENTIONS:  - Identify barriers to discharge w/patient and caregiver  - Arrange for needed discharge resources and transportation as appropriate  - Identify discharge learning needs (meds, wound care, etc )  - Arrange for interpretive services to assist at discharge as needed  - Refer to Case Management Department for coordinating discharge planning if the patient needs post-hospital services based on physician/advanced practitioner order or complex needs related to functional status, cognitive ability, or social support system  Outcome: Progressing     Problem: Prexisting or High Potential for Compromised Skin Integrity  Goal: Skin integrity is maintained or improved  Description: INTERVENTIONS:  - Identify patients at risk for skin breakdown  - Assess and monitor skin integrity  - Assess and monitor nutrition and hydration status  - Monitor labs   - Assess for incontinence   - Turn and reposition patient  - Assist with mobility/ambulation  - Relieve pressure over bony prominences  - Avoid friction and shearing  - Provide appropriate hygiene as needed including keeping skin clean and dry  - Evaluate need for skin moisturizer/barrier cream  - Collaborate with interdisciplinary team   - Patient/family teaching  - Consider wound care consult   Outcome: Progressing

## 2023-05-02 NOTE — PLAN OF CARE
Problem: MOBILITY - ADULT  Goal: Maintain or return to baseline ADL function  Description: INTERVENTIONS:  -  Assess patient's ability to carry out ADLs; assess patient's baseline for ADL function and identify physical deficits which impact ability to perform ADLs (bathing, care of mouth/teeth, toileting, grooming, dressing, etc )  - Assess/evaluate cause of self-care deficits   - Assess range of motion  - Assess patient's mobility; develop plan if impaired  - Assess patient's need for assistive devices and provide as appropriate  - Encourage maximum independence but intervene and supervise when necessary  - Involve family in performance of ADLs  - Assess for home care needs following discharge   - Consider OT consult to assist with ADL evaluation and planning for discharge  - Provide patient education as appropriate  Outcome: Progressing  Goal: Maintains/Returns to pre admission functional level  Description: INTERVENTIONS:  - Perform BMAT or MOVE assessment daily    - Set and communicate daily mobility goal to care team and patient/family/caregiver     - Collaborate with rehabilitation services on mobility goals if consulted  - Out of bed for toileting  - Record patient progress and toleration of activity level   Outcome: Progressing     Problem: PAIN - ADULT  Goal: Verbalizes/displays adequate comfort level or baseline comfort level  Description: Interventions:  - Encourage patient to monitor pain and request assistance  - Assess pain using appropriate pain scale  - Administer analgesics based on type and severity of pain and evaluate response  - Implement non-pharmacological measures as appropriate and evaluate response  - Consider cultural and social influences on pain and pain management  - Notify physician/advanced practitioner if interventions unsuccessful or patient reports new pain  Outcome: Progressing     Problem: INFECTION - ADULT  Goal: Absence or prevention of progression during hospitalization  Description: INTERVENTIONS:  - Assess and monitor for signs and symptoms of infection  - Monitor lab/diagnostic results  - Monitor all insertion sites, i e  indwelling lines, tubes, and drains  - Monitor endotracheal if appropriate and nasal secretions for changes in amount and color  - [place] appropriate cooling/warming therapies per order  - Administer medications as ordered  - Instruct and encourage patient and family to use good hand hygiene technique  - Identify and instruct in appropriate isolation precautions for identified infection/condition  Outcome: Progressing     Problem: DISCHARGE PLANNING  Goal: Discharge to home or other facility with appropriate resources  Description: INTERVENTIONS:  - Identify barriers to discharge w/patient and caregiver  - Arrange for needed discharge resources and transportation as appropriate  - Identify discharge learning needs (meds, wound care, etc )  - Arrange for interpretive services to assist at discharge as needed  - Refer to Case Management Department for coordinating discharge planning if the patient needs post-hospital services based on physician/advanced practitioner order or complex needs related to functional status, cognitive ability, or social support system  Outcome: Progressing     Problem: Prexisting or High Potential for Compromised Skin Integrity  Goal: Skin integrity is maintained or improved  Description: INTERVENTIONS:  - Identify patients at risk for skin breakdown  - Assess and monitor skin integrity  - Assess and monitor nutrition and hydration status  - Monitor labs   - Assess for incontinence   - Turn and reposition patient  - Assist with mobility/ambulation  - Relieve pressure over bony prominences  - Avoid friction and shearing  - Provide appropriate hygiene as needed including keeping skin clean and dry  - Evaluate need for skin moisturizer/barrier cream  - Collaborate with interdisciplinary team   - Patient/family teaching  - Consider wound care consult   Outcome: Progressing

## 2023-05-02 NOTE — CASE MANAGEMENT
Case Management Discharge Planning Note    Patient name Joanna Barillas  Location [address] [address] /E5 [place] [phone] MRN [id number]  : 1960 Date 2023       Current Admission Date: 2023  Current Admission Diagnosis:Cellulitis of right ankle   Patient Active Problem List    Diagnosis Date Noted    Fracture, toe 2023    Cellulitis of right ankle 2023    Non-pressure chronic ulcer of right calf with fat layer exposed ([place] [address] ) 2023    Other acute osteomyelitis, left ankle and foot ([place] [address] ) 10/29/2020    Embolism and thrombosis of arteries of the lower extremities ([place] [address] ) 10/29/2020    Lymphedema of right lower extremity 10/29/2020    Venous stasis dermatitis of right lower extremity 10/29/2020    Abdominal distension 10/29/2020    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    Nodular rash 2020    CAD (coronary artery disease) 2019    Amputated below knee, left ([place] [address] ) 10/04/2018    Phantom limb syndrome without pain ([place] [address] ) 10/04/2018    Obstructive sleep apnea 2018    Hyponatremia 2018    Diabetic autonomic neuropathy associated with type 2 diabetes mellitus ([place] [address] ) 2018    S/P CABG x 3 2018    Other constipation 2018    Chronic diastolic heart failure ([place] [address] ) 2018    Hypertensive heart disease with congestive heart failure (HCC)     Triple vessel coronary artery disease 2018    Diabetic gastroparesis ([place] [address] ) 2018    Class 2 severe obesity due to excess calories with serious comorbidity and body mass index (BMI) of 35 0 to 35 9 in adult ([place] [address] ) 2018    Orthostatic hypotension 2018    PAD (peripheral artery disease) ([place] [address] ) 2018    S/P BKA (below knee amputation), left ([place] [address] ) 2018    Anxiety and depression 2017    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 2017    Vitamin D deficiency 2016    Hyperlipidemia 2015    Diabetic neuropathy, type II diabetes mellitus ([facility] ) 11/06/2014      LOS (days): 15  Geometric Mean LOS (GMLOS) (days): 3 00  Days to GMLOS:-11 5     OBJECTIVE:  Risk of Unplanned Readmission Score: 11 55         Current admission status: Inpatient   Preferred Pharmacy:   [facility] [address] [place] [address]  Phone: [phone] Fax: [phone]    [facility] ([address] [facility],   [address] [facility]  [facility]  [address] [place] [id number]  Phone: [phone] Fax: [phone]    [facility] ([facility] ) - Anaid Smith [address],[facility] [address]  Phone: [phone] Fax: [phone]    Primary Care Provider: Eric Vizcaino DO    Primary Insurance: [facility]  Secondary Insurance:     DISCHARGE DETAILS:    Discharge planning discussed with[de-identified] Patient and Latoya Trinidad (Spouse) [phone] ([phone])  Freedom of Choice: Yes  Comments - Freedom of Choice: Pt discharging to [facility] today to being STR  CM contacted family/caregiver?: Yes  Were Treatment Team discharge recommendations reviewed with patient/caregiver?: Yes  Did patient/caregiver verbalize understanding of patient care needs?: Yes  Were patient/caregiver advised of the risks associated with not following Treatment Team discharge recommendations?: Yes    Contacts  Patient Contacts: Latoya Briscoencer (Spouse)  [phone] Roxann Ruiz)  Relationship to Patient[de-identified] Family  Contact Method: Phone  Phone Number: Latoya Trinidad (Spouse)  [phone] Roxann Pong)  Reason/Outcome: Continuity of Care, Emergency Contact    Discharge Destination Plan[de-identified] Short Term Rehab  Transport at Discharge : Wheelchair van  Dispatcher Contacted: Yes  Number/Name of Dispatcher: ROUNDTRIP  Transported by [place] and Unit #):  ZAYDA  ETA of Transport (Date): 05/02/23  ETA of Transport (Time): 1430  Transfer Mode: Wheelchair  Accompanied by: EMS personnel    IMM Given (Date):: 05/02/23  Family notified[de-identified] Sarina Gorman (Spouse) [phone] ([phone])     Additional Comments: Pt approved for STR and discharge to STR today  Pt will transport by [facility]  CM remains available through discharge

## 2023-05-03 ENCOUNTER — TRANSITIONAL CARE MANAGEMENT (OUTPATIENT)
Dept: FAMILY MEDICINE CLINIC | Facility: CLINIC | Age: 63
End: 2023-05-03

## 2023-05-04 ENCOUNTER — NURSING HOME VISIT (OUTPATIENT)
Dept: GERIATRICS | Facility: OTHER | Age: 63
End: 2023-05-04

## 2023-05-04 ENCOUNTER — TELEPHONE (OUTPATIENT)
Dept: FAMILY MEDICINE CLINIC | Facility: CLINIC | Age: 63
End: 2023-05-04

## 2023-05-04 DIAGNOSIS — I89.0 LYMPHEDEMA OF RIGHT LOWER EXTREMITY: ICD-10-CM

## 2023-05-04 DIAGNOSIS — F32.A ANXIETY AND DEPRESSION: ICD-10-CM

## 2023-05-04 DIAGNOSIS — E11.40 TYPE 2 DIABETES MELLITUS WITH DIABETIC NEUROPATHY, WITH LONG-TERM CURRENT USE OF INSULIN (HCC): ICD-10-CM

## 2023-05-04 DIAGNOSIS — E87.1 HYPONATREMIA: ICD-10-CM

## 2023-05-04 DIAGNOSIS — F41.9 ANXIETY AND DEPRESSION: ICD-10-CM

## 2023-05-04 DIAGNOSIS — K31.84 DIABETIC GASTROPARESIS (HCC): Chronic | ICD-10-CM

## 2023-05-04 DIAGNOSIS — E66.01 CLASS 2 SEVERE OBESITY DUE TO EXCESS CALORIES WITH SERIOUS COMORBIDITY AND BODY MASS INDEX (BMI) OF 35.0 TO 35.9 IN ADULT (HCC): ICD-10-CM

## 2023-05-04 DIAGNOSIS — L03.115 CELLULITIS OF RIGHT ANKLE: Primary | ICD-10-CM

## 2023-05-04 DIAGNOSIS — I50.32 CHRONIC DIASTOLIC HEART FAILURE (HCC): ICD-10-CM

## 2023-05-04 DIAGNOSIS — I95.1 ORTHOSTATIC HYPOTENSION: ICD-10-CM

## 2023-05-04 DIAGNOSIS — I25.10 TRIPLE VESSEL CORONARY ARTERY DISEASE: ICD-10-CM

## 2023-05-04 DIAGNOSIS — E78.2 MIXED HYPERLIPIDEMIA: ICD-10-CM

## 2023-05-04 DIAGNOSIS — Z79.4 TYPE 2 DIABETES MELLITUS WITH DIABETIC NEUROPATHY, WITH LONG-TERM CURRENT USE OF INSULIN (HCC): ICD-10-CM

## 2023-05-04 DIAGNOSIS — E11.43 DIABETIC GASTROPARESIS (HCC): Chronic | ICD-10-CM

## 2023-05-04 DIAGNOSIS — I73.9 PAD (PERIPHERAL ARTERY DISEASE) (HCC): ICD-10-CM

## 2023-05-04 NOTE — TELEPHONE ENCOUNTER
Patient is calling in for a jury duty letter stating he is disabled and can not attend jury duty  Pt Juror #: [id number] seq# [phone]    Please review and advise

## 2023-05-04 NOTE — ASSESSMENT & PLAN NOTE
Pt with RLE wound with exposed tendon at the compression stocking area  Appears chronic wound  Wound cx had grown pantoea species  Pt s/p course of abx  F/u outpatient with vascular surgery & wound care

## 2023-05-04 NOTE — PROGRESS NOTES
[facility]  [address], [address] [place] [address]  [address] [place] [address]    Nursing Home Admission    NAME: Odessa Avitia  AGE: 60 y o  SEX: male [id number]    DATE OF ENCOUNTER: 5/4/2023    Assessment/Plan:   Patients care was coordinated with nursing facility staff  Recent vitals, labs and updated medications were reviewed on [facility] system of facility  Past Medical, surgical, social, medication and allergy history and patients previous records reviewed  1  Cellulitis of right ankle        2  Diabetic gastroparesis ([address] )        3  Type 2 diabetes mellitus with diabetic neuropathy, with long-term current use of insulin ([address] )        4  Triple vessel coronary artery disease        5  PAD (peripheral artery disease) ([facility] [address] )        6  Orthostatic hypotension        7  Lymphedema of right lower extremity        8  Hyponatremia        9  Mixed hyperlipidemia        10  Class 2 severe obesity due to excess calories with serious comorbidity and body mass index (BMI) of 35 0 to 35 9 in adult ([facility] [address] )        11  Chronic diastolic heart failure ([facility] [address] )        12   Anxiety and depression             Cellulitis of right ankle  Pt with RLE wound with exposed tendon at the compression stocking area  Appears chronic wound  Wound cx had grown pantoea species  Pt s/p course of abx  F/u outpatient with vascular surgery & wound care    Diabetic gastroparesis (HCC)  Stable  No GI complaints  Cont reglan 5 mg 1 tab po daily  Cont pantoprazole 40 mg 1 tab po daily  Lab Results   Component Value Date    HGBA1C 6 7 (A) 02/02/2023       Diabetic neuropathy, type II diabetes mellitus ([facility] [address] )  Sugars controlled  Lab Results   Component Value Date    HGBA1C 6 7 (A) 02/02/2023     151 0 mg/dL 5/4/2023 17:10    5/4/2023 12:29 152 0 mg/dL    5/4/2023 09:05 102 0 mg/dL    5/4/2023 05:14 102 0 mg/dL    5/3/2023 20:21 169 0 mg/dL    5/3/2023 20:20 169 0 mg/dL    Cont insulin glargine 40 units SQ BID      Triple vessel coronary artery disease  Hx of CABG x 3  Pt asyx  Cont statin & plavix    PAD (peripheral artery disease) ([facility])  Pt had recent LEAD done which revealed Rt 50-70% prox popliteal stenosis  Pt with hx of Lt bka  Pt recently saw vascular surgery  Cont asa, plavix & atorvastatin    Orthostatic hypotension  5/4/2023 18:34 134 / 66 mmHg   BP stable  Cont midodine 2 5 mg 1 tab po TID before meals    Lymphedema of right lower extremity  Pt with lymphedema with chronic wound at right ankle  Consult [facility] wound care    Hyponatremia  Na 134 on 4/29  Stable  Check periodic bmp  F/u outpatient with pcp after discharge    Hyperlipidemia  Stable  Cont statin    Class 2 severe obesity due to excess calories with serious comorbidity and body mass index (BMI) of 35 0 to 35 9 in adult ([facility])  BMI 36 53  Recommend diet/exercise  Consult PT/OT    Anxiety and depression  Stable off meds  Cont supportive care      Chief Complaint     Here for STR    HPI   Patient is a 60 y o  male with PMH DMII, PAD, chronic diastolic HF, DMI autonomic neuropathy, CAD, orthostatic hypotension diabetic gastroparesis and hyperlipidemia  Pt admitted to [facility] from 4/18/2023-5/2/2023 after being admitted for RLE wounds  Pt underwent MRI which did not reveal osteomyelitis  However pt had noted edema with ulceration  Wound culture grew GNRs pantoea species  Pt completed ten day course of Keflex  Pt underwent LEAD study which revealed diffuse disease of RLE but adequate flow for healing  Vascular surgery evaluated the pt & stated pt at high risk of limb loss  Pt was instructed to continue IV ax for his cellulitis  In addition he was continued on asa & statin  Pt seen and examined today  Pt denies any leg pain  Pt here for physical therapy  Pt says his rt leg wrap due to be changed  Thus RN changed the wrap  Noted pt with lymphedema  Area where sock was appears to be chronic deeper wound   Pt also with trace bleeding from Rt greater toe  (POA) Pt denies any fever, chills, nausea or vomiting  Pt clinically stable         Past Medical History:   Diagnosis Date    Anxiety     Anxiety and depression     Cataract     Congestive cardiac failure ([facility] )     Coronary artery disease     Depression     Diabetes mellitus ([facility] )     Diabetic autonomic neuropathy associated with type 2 diabetes mellitus ([facility] )     Last Assessed: 2017    History of DVT (deep vein thrombosis)     left LE    Hyperlipidemia     Lymphedema of right lower extremity     Obesity     Orthostatic hypotension        Past Surgical History:   Procedure Laterality Date    CORONARY ARTERY BYPASS GRAFT      EYE SURGERY      cataracts bilateral    LEG AMPUTATION THROUGH LOWER TIBIA AND FIBULA Left 8/3/2018    Procedure: AMPUTATION BELOW KNEE (BKA) L BKA;  Surgeon: David Nesbitt MD;  [facility];  Service: Vascular    [place] CORONARY ARTERY BYP W/VEIN & ARTERY GRAFT 4 VEIN N/A 3/28/2018    Procedure: CORONARY ARTERY BYPASS GRAFT (CABG) x3 VESSELS, LIMA TO LAD, SVG--> PDA, SVG--> OM2,  RIGHT LEG EVH/SVH TO , INTRA-OP AMANDEEP;  Surgeon: Arik Yee MD;  [facility];  Service: Cardiac Surgery    ROTATOR CUFF REPAIR Bilateral        Social History     Tobacco Use   Smoking Status Former    Packs/day: 1 00    Years: 12 00    Pack years: 12 00    Types: Cigarettes    Quit date: 3/23/1994    Years since quittin 1   Smokeless Tobacco Never          Family History   Problem Relation Age of Onset    Atrial fibrillation Mother     Stroke Mother     Hypertension Father     Heart attack Paternal Grandfather 61    Diabetes Maternal Grandmother     Diabetes Maternal Grandfather     Diabetes Maternal Aunt     Diabetes Maternal Uncle         Allergies   Allergen Reactions    Metformin      Allergy listed on nursing home paperwork          Current Outpatient Medications:     aspirin (ECOTRIN LOW STRENGTH) 81 mg EC tablet, Take 81 mg by mouth daily, Disp: , Rfl:     atorvastatin (LIPITOR) 80 mg tablet, Take 1 tablet (80 mg total) by mouth daily, Disp: 90 tablet, Rfl: 1    cholecalciferol (VITAMIN D3) 1,000 units tablet, Take 1,000 Units by mouth daily, Disp: , Rfl:     clopidogrel (PLAVIX) 75 mg tablet, Take 1 tablet (75 mg total) by mouth daily, Disp: 90 tablet, Rfl: 1    finasteride (PROSCAR) 5 mg tablet, Take 1 tablet (5 mg total) by mouth daily, Disp: 90 tablet, Rfl: 1    furosemide (LASIX) 40 mg tablet, Take 1 tablet (40 mg total) by mouth 2 (two) times a day, Disp: 180 tablet, Rfl: 1    glucose blood test strip, ReliOn Prime Meter, Disp: , Rfl:     Insulin Glargine Solostar (Lantus SoloStar) 100 UNIT/ML SOPN, Inject 0 4 mL (40 Units total) under the skin 2 (two) times a day, Disp: 135 mL, Rfl: 0    metoclopramide (REGLAN) 5 mg tablet, Take 1 tablet (5 mg total) by mouth daily, Disp: 90 tablet, Rfl: 1    midodrine (PROAMATINE) 2 5 mg tablet, Take 1 tablet (2 5 mg total) by mouth 3 (three) times a day before meals, Disp: , Rfl: 0    pantoprazole (PROTONIX) 40 mg tablet, Take 1 tablet (40 mg total) by mouth daily in the early morning, Disp: 90 tablet, Rfl: 1    tamsulosin (FLOMAX) 0 4 mg, Take 1 capsule (0 4 mg total) by mouth daily with dinner, Disp: 90 capsule, Rfl: 1    Updated list was reviewed in [facility] system of facility  Vital signs were reviewed in [facility]    Review of Systems   Skin: Positive for wound  All other systems reviewed and are negative  Physical Exam  Constitutional:       General: He is not in acute distress  Appearance: He is not ill-appearing  HENT:      Head: Normocephalic and atraumatic  Cardiovascular:      Rate and Rhythm: Normal rate and regular rhythm  Heart sounds: Normal heart sounds  No murmur heard  Pulmonary:      Effort: No respiratory distress  Breath sounds: Normal breath sounds  No wheezing     Abdominal:      General: Bowel sounds are normal  There is no distension  Palpations: Abdomen is soft  Tenderness: There is no abdominal tenderness  Musculoskeletal:      Right lower leg: Edema present  Skin:     Comments: Pt with cauliflower type lymphedema on his right leg; pt with deep wound at right ankle with exposed tendon; pt with trace bleeding at Rt greater toe site  See wound care notes; Lt bka   Neurological:      Mental Status: He is alert and oriented to person, place, and time  Psychiatric:         Mood and Affect: Mood normal          Behavior: Behavior normal          Thought Content: Thought content normal          Judgment: Judgment normal        Diagnostic Data     Recent labs and imaging studies were reviewed  Code Status:    full  Additional notes: Total time spent on H&P 46 minutes in reviewing discharge paperwork from the hospital, interpreting test results from hospital medical records, and documenting information in the medical record  In addition, I provided counseling to the patient and encouraged them to work with physical therapy  Gave orders for patient's medications and nursing home admission orders       This note was electronically signed by Dr Ajit Prescott

## 2023-05-04 NOTE — ASSESSMENT & PLAN NOTE
Sugars controlled  Lab Results   Component Value Date    HGBA1C 6 7 (A) 02/02/2023     151 0 mg/dL 5/4/2023 17:10    5/4/2023 12:29 152 0 mg/dL    5/4/2023 09:05 102 0 mg/dL    5/4/2023 05:14 102 0 mg/dL    5/3/2023 20:21 169 0 mg/dL    5/3/2023 20:20 169 0 mg/dL    Cont insulin glargine 40 units SQ BID

## 2023-05-04 NOTE — ASSESSMENT & PLAN NOTE
Stable  No GI complaints  Cont reglan 5 mg 1 tab po daily  Cont pantoprazole 40 mg 1 tab po daily  Lab Results   Component Value Date    HGBA1C 6 7 (A) 02/02/2023

## 2023-05-05 NOTE — ASSESSMENT & PLAN NOTE
Pt had recent LEAD done which revealed Rt 50-70% prox popliteal stenosis  Pt with hx of Lt bka  Pt recently saw vascular surgery  Cont asa, plavix & atorvastatin

## 2023-05-08 ENCOUNTER — NURSING HOME VISIT (OUTPATIENT)
Dept: GERIATRICS | Facility: OTHER | Age: 63
End: 2023-05-08

## 2023-05-08 DIAGNOSIS — Z79.4 TYPE 2 DIABETES MELLITUS WITH DIABETIC NEUROPATHY, WITH LONG-TERM CURRENT USE OF INSULIN (HCC): ICD-10-CM

## 2023-05-08 DIAGNOSIS — E11.40 TYPE 2 DIABETES MELLITUS WITH DIABETIC NEUROPATHY, WITH LONG-TERM CURRENT USE OF INSULIN (HCC): ICD-10-CM

## 2023-05-08 DIAGNOSIS — I50.32 CHRONIC DIASTOLIC HEART FAILURE (HCC): ICD-10-CM

## 2023-05-08 DIAGNOSIS — I73.9 PAD (PERIPHERAL ARTERY DISEASE) (HCC): Primary | ICD-10-CM

## 2023-05-08 DIAGNOSIS — I95.1 ORTHOSTATIC HYPOTENSION: ICD-10-CM

## 2023-05-08 DIAGNOSIS — I89.0 LYMPHEDEMA OF RIGHT LOWER EXTREMITY: ICD-10-CM

## 2023-05-08 DIAGNOSIS — Z89.512 S/P BKA (BELOW KNEE AMPUTATION), LEFT (HCC): ICD-10-CM

## 2023-05-08 DIAGNOSIS — L03.115 CELLULITIS OF RIGHT ANKLE: ICD-10-CM

## 2023-05-08 NOTE — PROGRESS NOTES
"[facility]  [address]  [phone]  [facility] [place] [facility]  Code 31          NAME: Linda Wade  AGE: 60 y o  SEX: male     DATE OF ENCOUNTER: 5/8/2023    Assessment and Plan     1  PAD (peripheral artery disease) ([facility])  Assessment & Plan:  RLE lymphedema with neuropathy  Now compressive tissue loss with exposed tendon of right ankle due to rolled down compression sock cutting into tissue  4/18 arterial duplex , RLE w/50-75% stenosis of the proximal pop artery, YUDITH not obtained, MTP//73  Per inpatient vascular surgery consult: \"Imaging suggests adequate perfusion for healing  Patient does remain at significant risk of limb loss given extent of lymphedema and wound w/exposed tendon\"  Recommendations:   RLE elevation and compression   Local wound care per podiatry  Follow up formal MRI read   Please reconsult if limb is felt to be nonsalvageable  Patient would require AKA in this scenario   F/u with vascular surgery outpatient  Continue aspirin, plavix, statin           2  Cellulitis of right ankle  Assessment & Plan:  Compressive injury with exposed tendon  Negative for definitive osteomyelitis  Completed a course of abx while inpatient  Continue wound care per podiatry recommendations with wound center f/u      3  Lymphedema of right lower extremity  Assessment & Plan:  Stage III lymphedema with fibrosis, hyperkeratosis, and now R ankle wound  F/u at wound center and vascular   Continue compression wrap      4   Type 2 diabetes mellitus with diabetic neuropathy, with long-term current use of insulin ([facility])  Assessment & Plan:    Lab Results   Component Value Date    HGBA1C 6 7 (A) 02/02/2023     Blood sugar trends reviewed,stable  Continue Lantus 40 units BID- this was decreased from home regimen of 80 units BID while inpatient due to hypoglycemia  Continue DM diet, weight management, activity as tolerated  Monitor for renal and cardiovascular comorbidity- monitor routine " labs   CBC, BMP pending for 5/11  Avoid hypo/hyperglycemia        5  Orthostatic hypotension  Assessment & Plan:  Overall stable  Continue midodrine      6  Chronic diastolic heart failure [facility])  Assessment & Plan:  Weight remains stable  2D echo 2/2020: Normal left ventricular ejection fraction  Grade 1 diastolic dysfunction  Appears euvolemic on today's exam  Continue furosemide and weight trends        7  S/P BKA (below knee amputation), left (HCC)  Assessment & Plan:  Stable  Continue with L BKA prosthesis  PT/OT           All medications and routine orders were reviewed and updated as needed      Chief Complaint     STR follow up visit      Past Medical and Surgica History      Past Medical History:   Diagnosis Date   • Anxiety    • Anxiety and depression    • Cataract    • Congestive cardiac failure ([facility] [address] )    • Coronary artery disease    • Depression    • Diabetes mellitus ([facility] [address] )    • Diabetic autonomic neuropathy associated with type 2 diabetes mellitus ([facility] [address] )     Last Assessed: 12/28/2017   • History of DVT (deep vein thrombosis)     left LE   • Hyperlipidemia    • Lymphedema of right lower extremity    • Obesity    • Orthostatic hypotension      Past Surgical History:   Procedure Laterality Date   • CORONARY ARTERY BYPASS GRAFT     • EYE SURGERY      cataracts bilateral   • LEG AMPUTATION THROUGH LOWER TIBIA AND FIBULA Left 8/3/2018    Procedure: AMPUTATION BELOW KNEE (BKA) L BKA;  Surgeon: Nori Hurley MD;  Location[facility];  Service: Vascular   • [place] CORONARY ARTERY BYP W/VEIN & ARTERY GRAFT 4 VEIN N/A 3/28/2018    Procedure: CORONARY ARTERY BYPASS GRAFT (CABG) x3 VESSELS, LIMA TO LAD, SVG--> PDA, SVG--> OM2,  RIGHT LEG EVH/SVH TO , INTRA-OP AMANDEEP;  Surgeon: Carmen Simon MD;  Location: [facility];  Service: Cardiac Surgery   • ROTATOR CUFF REPAIR Bilateral      Allergies   Allergen Reactions   • Metformin      Allergy listed on nursing home paperwork          History of Present Illness     Wendy Vegas "Dana Barnard is a 77-year-old male admitted to [facility] for short term rehab  PMH including but not limited to DM2, neuropathy, HEMA, PAD, CAD, HTN, CHF, HLD, left BKA, lymphedema of right lower extremity, seen in collaboration with nursing for medical mgmt and STR follow up  Hospital course:  [facility] for right lower extremity wounds, negative for osteomyelitis  He was evaluated by vascular surgery and podiatry with LEAD study showing potential for healing however he remains at high risk for limb loss  He also had nondisplaced fracture of the right first toe not needing intervention  completed course of antibiotics for right ankle cellulitis while inpatient  He was discharged to [facility] for short term rehab  Short term rehab course:  Continues with supportive care at short term rehab including PT/OT  Per PT note: \"Pt ambulated upto 52ft x2 with RW, WC follow and SBA for safety  Pt ambulated with foot drop on RL \"  Upon today's assessment, he was seen sitting OOB in chair at bedside, he was awake, alert, NAD  Denies any pain or SOB  He continues with ace wrap and wound care to RLE, lymphedema noted  Left LE prosthesis in place  Blood sugars stable, no episodes of hypoglycemia  Does report some lightheadedness with standing, continues on midodrine for orthostatic hypotension  Reports eating and drinking and sleeping \"good\", reports he sleeps in recliner like he does at home  STR chart reviewed, appetite fair to good, eating % of meals, last BM 5/8  The patient's allergies, past medical, surgical, social and family history were reviewed and unchanged  Review of Systems     REVIEW OF SYSTEMS:  Per history of present illness, all other systems reviewed and negative        Objective     Vitals: /52, temp 97 8, HR 72, RR 20, O2 98% on room air  Weight 173 5 pounds      Physical " Exam  Constitutional:       General: He is not in acute distress  Appearance: Normal appearance  He is not ill-appearing  HENT:      Head: Normocephalic  Right Ear: External ear normal       Left Ear: External ear normal       Nose: Nose normal       Mouth/Throat:      Mouth: Mucous membranes are moist       Pharynx: No oropharyngeal exudate or posterior oropharyngeal erythema  Eyes:      General:         Right eye: No discharge  Left eye: No discharge  Extraocular Movements: Extraocular movements intact  Conjunctiva/sclera: Conjunctivae normal       Pupils: Pupils are equal, round, and reactive to light  Comments: Reports baseline blurry vision   Cardiovascular:      Rate and Rhythm: Normal rate and regular rhythm  Pulses: Normal pulses  Heart sounds: Normal heart sounds  Pulmonary:      Effort: Pulmonary effort is normal  No respiratory distress  Breath sounds: Normal breath sounds  No wheezing or rhonchi  Abdominal:      General: Bowel sounds are normal  There is no distension  Palpations: Abdomen is soft  Tenderness: There is no abdominal tenderness  There is no guarding  Musculoskeletal:         General: No tenderness  Normal range of motion  Cervical back: Normal range of motion and neck supple  No rigidity or tenderness  Right lower leg: Edema present  Comments: RLE lymphedema, ace wrap in place  Left BKA with prothesis   Skin:     General: Skin is warm  Coloration: Skin is not pale  Findings: No rash  Neurological:      General: No focal deficit present  Mental Status: He is alert and oriented to person, place, and time  Mental status is at baseline  Sensory: Sensory deficit present  Motor: Weakness present        Gait: Gait abnormal       Comments: LLE prothesis  No sensation of RLE or B/L UEs   Psychiatric:         Mood and Affect: Mood normal          Behavior: Behavior normal          Pertinent "Laboratory/Diagnostic Studies:       Lab Results   Component Value Date     09/13/2016    SODIUM 134 (L) 05/02/2023    K 4 0 05/02/2023    CL 97 05/02/2023    CO2 29 05/02/2023    AGAP 8 05/02/2023    BUN 17 05/02/2023    CREATININE 0 95 05/02/2023    GLUC 136 05/02/2023    GLUF 139 (H) 02/05/2020    CALCIUM 8 9 05/02/2023    AST 12 (L) 04/19/2023    ALT 9 04/19/2023    ALKPHOS 35 04/19/2023    PROT 7 4 09/13/2016    TP 7 0 04/19/2023    BILITOT 0 4 09/13/2016    TBILI 0 44 04/19/2023    EGFR 84 05/02/2023     Lab Results   Component Value Date    WBC 10 23 (H) 05/02/2023    HGB 12 3 05/02/2023    HCT 38 7 05/02/2023    MCV 87 05/02/2023     05/02/2023     Lab Results   Component Value Date    [id number] 20 7 (L) 09/13/2016     No results found  Current Medications   Medications reviewed and updated see facility [facility] for details  Please note:  Voice-recognition software may have been used in the preparation of this document  Occasional wrong word or \"sound-alike\" substitutions may have occurred due to the inherent limitations of voice recognition software  Interpretation should be guided by context           CARLTON Stallings  5/8/2023 10:50 AM  Patient: Debra Omer  "

## 2023-05-09 NOTE — ASSESSMENT & PLAN NOTE
Stage III lymphedema with fibrosis, hyperkeratosis, and now R ankle wound  F/u at wound center and vascular   Continue compression wrap

## 2023-05-09 NOTE — ASSESSMENT & PLAN NOTE
Compressive injury with exposed tendon  Negative for definitive osteomyelitis  Completed a course of abx while inpatient  Continue wound care per podiatry recommendations with wound center f/u

## 2023-05-09 NOTE — ASSESSMENT & PLAN NOTE
"RLE lymphedema with neuropathy  Now compressive tissue loss with exposed tendon of right ankle due to rolled down compression sock cutting into tissue  4/18 arterial duplex , RLE w/50-75% stenosis of the proximal pop artery, YUDITH not obtained, MTP//73  Per inpatient vascular surgery consult: \"Imaging suggests adequate perfusion for healing  Patient does remain at significant risk of limb loss given extent of lymphedema and wound w/exposed tendon\"  Recommendations:   RLE elevation and compression   Local wound care per podiatry  Follow up formal MRI read   Please reconsult if limb is felt to be nonsalvageable   Patient would require AKA in this scenario   F/u with vascular surgery outpatient  Continue aspirin, plavix, statin       "

## 2023-05-09 NOTE — ASSESSMENT & PLAN NOTE
Weight remains stable  2D echo 2/2020: Normal left ventricular ejection fraction  Grade 1 diastolic dysfunction    Appears euvolemic on today's exam  Continue furosemide and weight trends

## 2023-05-09 NOTE — ASSESSMENT & PLAN NOTE
Lab Results   Component Value Date    HGBA1C 6 7 (A) 02/02/2023     Blood sugar trends reviewed,stable  Continue Lantus 40 units BID- this was decreased from home regimen of 80 units BID while inpatient due to hypoglycemia  Continue DM diet, weight management, activity as tolerated  Monitor for renal and cardiovascular comorbidity- monitor routine labs   CBC, BMP pending for 5/11  Avoid hypo/hyperglycemia

## 2023-05-17 RX ORDER — AMMONIUM LACTATE 12 G/100G
1 LOTION TOPICAL DAILY
COMMUNITY

## 2023-05-18 ENCOUNTER — NURSING HOME VISIT (OUTPATIENT)
Dept: GERIATRICS | Facility: OTHER | Age: 63
End: 2023-05-18

## 2023-05-18 DIAGNOSIS — E11.40 TYPE 2 DIABETES MELLITUS WITH DIABETIC NEUROPATHY, WITH LONG-TERM CURRENT USE OF INSULIN (HCC): ICD-10-CM

## 2023-05-18 DIAGNOSIS — Z79.4 TYPE 2 DIABETES MELLITUS WITH DIABETIC NEUROPATHY, WITH LONG-TERM CURRENT USE OF INSULIN (HCC): ICD-10-CM

## 2023-05-18 DIAGNOSIS — I50.32 CHRONIC DIASTOLIC HEART FAILURE (HCC): ICD-10-CM

## 2023-05-18 DIAGNOSIS — L03.115 CELLULITIS OF RIGHT ANKLE: ICD-10-CM

## 2023-05-18 DIAGNOSIS — I73.9 PAD (PERIPHERAL ARTERY DISEASE) (HCC): Primary | ICD-10-CM

## 2023-05-18 DIAGNOSIS — I89.0 LYMPHEDEMA OF RIGHT LOWER EXTREMITY: ICD-10-CM

## 2023-05-18 DIAGNOSIS — I95.1 ORTHOSTATIC HYPOTENSION: ICD-10-CM

## 2023-05-18 NOTE — ASSESSMENT & PLAN NOTE
Lab Results   Component Value Date    HGBA1C 6 7 (A) 02/02/2023     Blood sugar trends reviewed,stable  Continue Lantus 40 units BID- this was decreased from home regimen of 80 units BID while inpatient due to hypoglycemia  Continue accu-checks at home  Continue DM diet, weight management, activity as tolerated  Monitor for renal and cardiovascular comorbidity- monitor routine labs   Avoid hypo/hyperglycemia

## 2023-05-18 NOTE — ASSESSMENT & PLAN NOTE
Stable  Weight remains stable at 273 6 lb  2D echo 2/2020: Normal left ventricular ejection fraction  Grade 1 diastolic dysfunction    Appears euvolemic on today's exam  Continue furosemide and weight trends

## 2023-05-18 NOTE — ASSESSMENT & PLAN NOTE
"RLE lymphedema with neuropathy  Now compressive tissue loss with exposed tendon of right ankle due to rolled down compression sock cutting into tissue  4/18 arterial duplex , RLE w/50-75% stenosis of the proximal pop artery, YUDITH not obtained, MTP//73  Per inpatient vascular surgery consult: \"Imaging suggests adequate perfusion for healing  Patient does remain at significant risk of limb loss given extent of lymphedema and wound w/exposed tendon\"  Recommendations:   RLE elevation and compression   Local wound care per podiatry  Follow up formal MRI read   Please reconsult if limb is felt to be nonsalvageable   Patient would require AKA in this scenario   F/u with vascular surgery outpatient  Continue aspirin, plavix, statin  Discharge home with [facility] for continue wound care, has f/u wound center apt on 5/23     "

## 2023-05-18 NOTE — PROGRESS NOTES
[facility]  [address]  [phone]  [facility]: [place]  Code 31    NAME: Vince Bertrand  AGE: 60 y o  SEX: male     DATE OF ADMISSION: 5/2/2023   DATE OF DISCHARGE: 5/20/2023  DISCHARGE DISPOSITION: Stable for discharge to home with family support and home health services  Reason for Admission: Patient was admitted to  for rehabilitation after hospitalization for RLE wounds and right ankle cellulits  Past Medical and Surgical History:   Past Medical History:   Diagnosis Date   • Anxiety    • Anxiety and depression    • Cataract    • Congest[facility] cardiac failure ([facility] )    • Coronary artery disease    • Depression    • Diabetes mellitus ([facility] )    • Diabetic autonomic neuropathy associated with type 2 diabetes mellitus ([facility] )     Last Assessed: 12/28/2017   • History of DVT (deep vein thrombosis)     left LE   • Hyperlipidemia    • Lymphedema of right lower extremity    • Obesity    • Orthostatic hypotension       Past Surgical History:   Procedure Laterality Date   • CORONARY ARTERY BYPASS GRAFT     • EYE SURGERY      cataracts bilateral   • LEG AMPUTATION THROUGH LOWER TIBIA AND FIBULA Left 8/3/2018    Procedure: AMPUTATION BELOW KNEE (BKA) L BKA;  Surgeon: Teresa Hong MD;  Location: [facility]IN OR;  Service: Vascular   • [place] CORONARY ARTERY BYP W/VEIN & ARTERY GRAFT 4 VEIN N/A 3/28/2018    Procedure: CORONARY ARTERY BYPASS GRAFT (CABG) x3 VESSELS, LIMA TO LAD, SVG--> PDA, SVG--> OM2,  RIGHT LEG EVH/SVH TO , INTRA-OP AMANDEEP;  Surgeon: Serenity Vail MD;  Location: [facility];  Service: Cardiac Surgery   • ROTATOR CUFF REPAIR Bilateral        Course of stay:     Vince Bertrand is a 76-year-old male admitted to  for short term rehab   PMH including but not limited to DM2, neuropathy, HEMA, PAD, CAD, HTN, CHF, HLD, left BKA, lymphedema of right lower extremity, seen in collaboration with nursing for medical mgmt and "STR follow up and discharge       Hospital course:  He was admitted to [facility] from 4/17 through 5/2 for right lower extremity wounds, negative for osteomyelitis  He was evaluated by vascular surgery and podiatry with LEAD study showing potential for healing however he remains at high risk for limb loss  He also had nondisplaced fracture of the right first toe not needing intervention  completed course of antibiotics for right ankle cellulitis while inpatient  He was discharged to [facility] for short term rehab       Short term rehab course:  Continues with PT/OT at [facility]  Per PT note: \"Pt ambulated upto 75ft and 40ft w/ RW\"  Continues with wound care and wrap to RLE  LLE prosthesis present  Will follow with  and wound center upon discharge  He denies any fever, fatigue, or chills  Reports he is doing well  Blood sugars stable ranging 120-170, continues on Lantus  Continues with furosemide, weight stable  Blood pressures also stable, continues on midodrine  STR chart reviewed, appetite overall good, eating % of meals, last BM on 5/17  Discharge:  Coordinated by [facility]  with discharge planned for 5/20 to home with home health  ROS:  Per history of present illness, all other systems reviewed and negative  PHYSICAL EXAM:  VITALS:  /63, temp 97 7, HR 72, RR 20, O2 95%  Weight 273 6 pounds    Physical Exam  Constitutional:       General: He is not in acute distress  Appearance: Normal appearance  He is not ill-appearing  HENT:      Head: Normocephalic  Right Ear: External ear normal       Left Ear: External ear normal       Nose: Nose normal       Mouth/Throat:      Mouth: Mucous membranes are moist       Pharynx: No oropharyngeal exudate or posterior oropharyngeal erythema  Eyes:      General:         Right eye: No discharge  Left eye: No discharge  Extraocular Movements: Extraocular movements intact        " "Conjunctiva/sclera: Conjunctivae normal       Pupils: Pupils are equal, round, and reactive to light  Comments: Reports baseline blurry vision   Cardiovascular:      Rate and Rhythm: Normal rate and regular rhythm  Pulses: Normal pulses  Heart sounds: Normal heart sounds  Pulmonary:      Effort: Pulmonary effort is normal  No respiratory distress  Breath sounds: Normal breath sounds  No wheezing or rhonchi  Abdominal:      General: Bowel sounds are normal  There is no distension  Palpations: Abdomen is soft  Tenderness: There is no abdominal tenderness  There is no guarding  Musculoskeletal:         General: No tenderness  Normal range of motion  Cervical back: Normal range of motion and neck supple  No rigidity or tenderness  Right lower leg: Edema present  Comments: RLE lymphedema, ace wrap in place  Left BKA with prothesis   Skin:     General: Skin is warm  Coloration: Skin is not pale  Findings: No rash  Neurological:      General: No focal deficit present  Mental Status: He is alert and oriented to person, place, and time  Mental status is at baseline  Sensory: Sensory deficit present  Motor: Weakness present  Gait: Gait abnormal       Comments: LLE prothesis  No sensation of RLE or B/L UEs   Psychiatric:         Mood and Affect: Mood normal          Behavior: Behavior normal          Admission Diagnoses:   1  PAD (peripheral artery disease) ([facility])  Assessment & Plan:  RLE lymphedema with neuropathy  Now compressive tissue loss with exposed tendon of right ankle due to rolled down compression sock cutting into tissue  4/18 arterial duplex , RLE w/50-75% stenosis of the proximal pop artery, YUDITH not obtained, MTP//73  Per inpatient vascular surgery consult: \"Imaging suggests adequate perfusion for healing   Patient does remain at significant risk of limb loss given extent of lymphedema and wound w/exposed " "tendon\"  Recommendations:   RLE elevation and compression   Local wound care per podiatry  Follow up formal MRI read   Please reconsult if limb is felt to be nonsalvageable  Patient would require AKA in this scenario   F/u with vascular surgery outpatient  Continue aspirin, plavix, statin  Discharge home with [facility] for continue wound care, has f/u wound center apt on 5/23         2  Type 2 diabetes mellitus with diabetic neuropathy, with long-term current use of insulin ([facility])  Assessment & Plan:    Lab Results   Component Value Date    HGBA1C 6 7 (A) 02/02/2023     Blood sugar trends reviewed,stable  Continue Lantus 40 units BID- this was decreased from home regimen of 80 units BID while inpatient due to hypoglycemia  Continue accu-checks at home  Continue DM diet, weight management, activity as tolerated  Monitor for renal and cardiovascular comorbidity- monitor routine labs   Avoid hypo/hyperglycemia         3  Lymphedema of right lower extremity  Assessment & Plan:  Stage III lymphedema with fibrosis, hyperkeratosis, and now R ankle wound  F/u at wound center and vascular   Continue compression wrap      4  Cellulitis of right ankle  Assessment & Plan:  Compressive injury with exposed tendon  Negative for definitive osteomyelitis  Completed a course of abx while inpatient  Continue wound care per podiatry recommendations with wound center f/u      5  Chronic diastolic heart failure [facility])  Assessment & Plan:  Stable  Weight remains stable at 273 6 lb  2D echo 2/2020: Normal left ventricular ejection fraction  Grade 1 diastolic dysfunction  Appears euvolemic on today's exam  Continue furosemide and weight trends        6   Orthostatic hypotension  Assessment & Plan:  Stable  Continue midodrine  All extra time when changing positions              Follow-up Recommendations:    • Outpatient Follow up with PCP in the next 2 weeks  • Home Health services   • Wound care center/podiatry    Labs and testing performed during " stay:    5/11 BMP, CBC: Glucose 99, BUN 14, creatinine 1 07, sodium 140, potassium 4 2, EGFR 78, hemoglobin 12 2, WBC 9 2, platelet 242    Lab Results   Component Value Date     09/13/2016    SODIUM 134 (L) 05/02/2023    K 4 0 05/02/2023    CL 97 05/02/2023    CO2 29 05/02/2023    AGAP 8 05/02/2023    BUN 17 05/02/2023    CREATININE 0 95 05/02/2023    GLUC 136 05/02/2023    GLUF 139 (H) 02/05/2020    CALCIUM 8 9 05/02/2023    AST 12 (L) 04/19/2023    ALT 9 04/19/2023    ALKPHOS 35 04/19/2023    PROT 7 4 09/13/2016    TP 7 0 04/19/2023    BILITOT 0 4 09/13/2016    TBILI 0 44 04/19/2023    EGFR 84 05/02/2023     Lab Results   Component Value Date    WBC 10 23 (H) 05/02/2023    HGB 12 3 05/02/2023    HCT 38 7 05/02/2023    MCV 87 05/02/2023     05/02/2023     Lab Results   Component Value Date    [id number] 20 7 (L) 09/13/2016     No results found  Discharge Medications: See discharge medication list which was reviewed and signed  Current Outpatient Medications:   •  ammonium lactate (LAC-HYDRIN) 12 % lotion, Apply 1 application   topically in the morning, Disp: , Rfl:   •  aspirin (ECOTRIN LOW STRENGTH) 81 mg EC tablet, Take 81 mg by mouth daily, Disp: , Rfl:   •  atorvastatin (LIPITOR) 80 mg tablet, Take 1 tablet (80 mg total) by mouth daily, Disp: 90 tablet, Rfl: 1  •  cholecalciferol (VITAMIN D3) 1,000 units tablet, Take 1,000 Units by mouth daily, Disp: , Rfl:   •  clopidogrel (PLAVIX) 75 mg tablet, Take 1 tablet (75 mg total) by mouth daily, Disp: 90 tablet, Rfl: 1  •  finasteride (PROSCAR) 5 mg tablet, Take 1 tablet (5 mg total) by mouth daily, Disp: 90 tablet, Rfl: 1  •  furosemide (LASIX) 40 mg tablet, Take 1 tablet (40 mg total) by mouth 2 (two) times a day, Disp: 180 tablet, Rfl: 1  •  glucose blood test strip, ReliOn Prime Meter, Disp: , Rfl:   •  Insulin Glargine Solostar (Lantus SoloStar) 100 UNIT/ML SOPN, Inject 0 4 mL (40 Units total) under the skin 2 (two) times a day, Disp: 135 "mL, Rfl: 0  •  metoclopramide (REGLAN) 5 mg tablet, Take 1 tablet (5 mg total) by mouth daily, Disp: 90 tablet, Rfl: 1  •  midodrine (PROAMATINE) 2 5 mg tablet, Take 1 tablet (2 5 mg total) by mouth 3 (three) times a day before meals, Disp: , Rfl: 0  •  pantoprazole (PROTONIX) 40 mg tablet, Take 1 tablet (40 mg total) by mouth daily in the early morning, Disp: 90 tablet, Rfl: 1  •  tamsulosin (FLOMAX) 0 4 mg, Take 1 capsule (0 4 mg total) by mouth daily with dinner, Disp: 90 capsule, Rfl: 1     Discussion with patient and further instructions:  -Fall precautions  -Aspiration precautions  -Bleeding precautions  -Monitor for signs/symptoms of infection  -Medication list was reviewed and updated      Status at time of discharge: Stable      Billing based on time  Time spent on unit, 40 minutes  Time spent counseling pt on debility/condition, 30 minutes  Please note:  Voice-recognition software may have been used in the preparation of this document  Occasional wrong word or \"sound-alike\" substitutions may have occurred due to the inherent limitations of voice recognition software  Interpretation should be guided by context          CARLTON Flores  5/18/2023  Patient: Cecleia Maldonado  "

## 2023-05-19 ENCOUNTER — NURSING HOME VISIT (OUTPATIENT)
Dept: GERIATRICS | Facility: OTHER | Age: 63
End: 2023-05-19

## 2023-05-19 DIAGNOSIS — F41.9 ANXIETY AND DEPRESSION: ICD-10-CM

## 2023-05-19 DIAGNOSIS — I25.10 CORONARY ARTERY DISEASE INVOLVING NATIVE CORONARY ARTERY OF NATIVE HEART WITHOUT ANGINA PECTORIS: ICD-10-CM

## 2023-05-19 DIAGNOSIS — L03.115 CELLULITIS OF RIGHT ANKLE: Primary | ICD-10-CM

## 2023-05-19 DIAGNOSIS — F32.A ANXIETY AND DEPRESSION: ICD-10-CM

## 2023-05-19 DIAGNOSIS — E78.2 MIXED HYPERLIPIDEMIA: ICD-10-CM

## 2023-05-19 DIAGNOSIS — I50.32 CHRONIC DIASTOLIC HEART FAILURE (HCC): ICD-10-CM

## 2023-05-19 NOTE — PROGRESS NOTES
[facility]  [address]  [phone]  [facility]  POS [address]      NAME: Dorothy Latif  AGE: 60 y o  SEX: male [phone]    DATE OF ENCOUNTER: 5/19/2023    Assessment and Plan     1  Cellulitis of right ankle        2  Coronary artery disease involving native coronary artery of native heart without angina pectoris        3  Mixed hyperlipidemia        4  Anxiety and depression        5  Chronic diastolic heart failure (HCC)           Cellulitis of right ankle  Resolved  Pt with exposed tendon (POA) over 75% horizontally improved  MRI did NOT show definitive osteomyelitis  Wound cx had grown pantoea species sensitive to cephalosporoin  Pt completed course of antibiotics as inpatient  Pt medically stable for dc with f/u with podiatry this [address] Work arranged home health    CAD (coronary artery disease)  Stable  Cont asa, plavix & statin    Hyperlipidemia  Stable  Cont statin    Anxiety and depression  Stable   Pt off meds  Cont supportive care  Pt happy to be going home tomorrow    Chronic diastolic heart failure (HCC)  Wt Readings from Last 3 Encounters:   05/02/23 126 kg (276 lb 14 4 oz)   09/09/21 127 kg (280 lb)   06/09/21 127 kg (280 lb)   pt euvolemic  2/2020 echo with grade 1 diastolic dysfunction  Cont lasix           Chief Complaint     Routine STR follow-up visit  History of Present Illness   Pt seen and examined as pt being discharged tomorrow  Would like to evalute wound -  See media image  It has markedly improved over the tendon area  His skin is healing 75% over the tendon in his ankle wound  No signs of acute infection comparing to previous media images   Encouraged pt to take care of his wound as per podiatry recommendations and home health will also come to assist     The following portions of the patient's history were reviewed and updated as appropriate: allergies, current medications, past family history, past medical history, past social history, past surgical history and problem list     Review of Systems     Review of Systems   All other systems reviewed and are negative  Active Problem List     Patient Active Problem List   Diagnosis   • Anxiety and depression   • Type 2 diabetes mellitus with diabetic neuropathy, with long-term current use of insulin ([address] )   • Hyperlipidemia   • 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   • Vitamin D deficiency   • PAD (peripheral artery disease) ([facility])   • S/P BKA (below knee amputation), left ([facility])   • Orthostatic hypotension   • Diabetic gastroparesis ([facility])   • Class 2 severe obesity due to excess calories with serious comorbidity and body mass index (BMI) of 35 0 to 35 9 in [facility])   • Triple vessel coronary artery disease   • Hypertensive heart disease with congestive heart failure ([facility])   • Chronic diastolic heart failure ([facility])   • Other constipation   • S/P CABG x 3   • Diabetic autonomic neuropathy associated with type 2 diabetes mellitus ([facility])   • Hyponatremia   • Obstructive sleep apnea   • Amputated below knee, left ([facility])   • Phantom limb syndrome without pain ([facility])   • CAD (coronary artery disease)   • Nodular rash   • Elephantiasis nostras verrucosa   • Other acute osteomyelitis, left ankle and foot ([facility])   • Embolism and thrombosis of arteries of the lower extremities ([facility])   • Lymphedema of right lower extremity   • Venous stasis dermatitis of right lower extremity   • Abdominal distension   • Non-pressure chronic ulcer of right calf with fat layer exposed ([facility] )   • Cellulitis of right ankle   • Fracture, toe       Objective     Vitals: Reviewed in [employer] system  VSS    General: Awake, alert & oriented x 3  Head: atraumatic, normocephalic  Cardiovascular: RRR  Lungs: Clear to auscultation bilaterally  Abdomen: nontender/nondistended, +BS  Legs: no cyanosis, clubbing; pt with left bka & prosthesis;  Rt leg healing over the exposed tendon area; 75% improved compared to admission; see media image; pt with stage III lymphedema with fibrosis & hyperkeratosis  Skin: clean, dry, intact  Psych: calm, cooperative    Pertinent Laboratory/Diagnostic Studies:  Refer to facility chart  Current Medications   Medications reviewed and updated in facility chart      Kamaljit Cornell MD  Internal Medicine  Senior Care Physician

## 2023-05-20 NOTE — ASSESSMENT & PLAN NOTE
Resolved  Pt with exposed tendon (POA) over 75% horizontally improved    MRI did NOT show definitive osteomyelitis  Wound cx had grown pantoea species sensitive to cephalosporoin  Pt completed course of antibiotics as inpatient  Pt medically stable for dc with f/u with podiatry this [address] Work arranged home health

## 2023-05-20 NOTE — ASSESSMENT & PLAN NOTE
Wt Readings from Last 3 Encounters:   05/02/23 126 kg (276 lb 14 4 oz)   09/09/21 127 kg (280 lb)   06/09/21 127 kg (280 lb)   pt euvolemic  2/2020 echo with grade 1 diastolic dysfunction  Cont lasix

## 2023-05-23 ENCOUNTER — OFFICE VISIT (OUTPATIENT)
Dept: WOUND CARE | Facility: CLINIC | Age: 63
End: 2023-05-23

## 2023-05-23 VITALS
HEART RATE: 84 BPM | RESPIRATION RATE: 16 BRPM | DIASTOLIC BLOOD PRESSURE: 80 MMHG | TEMPERATURE: 98.8 F | SYSTOLIC BLOOD PRESSURE: 118 MMHG

## 2023-05-23 DIAGNOSIS — E11.8 TYPE 2 DIABETES MELLITUS WITH COMPLICATION, WITH LONG-TERM CURRENT USE OF INSULIN (HCC): ICD-10-CM

## 2023-05-23 DIAGNOSIS — E11.621 DIABETIC ULCER OF TOE OF RIGHT FOOT ASSOCIATED WITH TYPE 2 DIABETES MELLITUS, LIMITED TO BREAKDOWN OF SKIN (HCC): ICD-10-CM

## 2023-05-23 DIAGNOSIS — L97.511 DIABETIC ULCER OF TOE OF RIGHT FOOT ASSOCIATED WITH TYPE 2 DIABETES MELLITUS, LIMITED TO BREAKDOWN OF SKIN (HCC): ICD-10-CM

## 2023-05-23 DIAGNOSIS — Z79.4 TYPE 2 DIABETES MELLITUS WITH COMPLICATION, WITH LONG-TERM CURRENT USE OF INSULIN (HCC): ICD-10-CM

## 2023-05-23 DIAGNOSIS — E11.622 DIABETIC ULCER OF RIGHT LOWER LEG ASSOCIATED WITH TYPE 2 DIABETES MELLITUS, WITH FAT LAYER EXPOSED (HCC): Primary | ICD-10-CM

## 2023-05-23 DIAGNOSIS — L97.912 DIABETIC ULCER OF RIGHT LOWER LEG ASSOCIATED WITH TYPE 2 DIABETES MELLITUS, WITH FAT LAYER EXPOSED (HCC): Primary | ICD-10-CM

## 2023-05-23 DIAGNOSIS — I89.0 LYMPHEDEMA OF RIGHT LOWER EXTREMITY: ICD-10-CM

## 2023-05-23 RX ORDER — LIDOCAINE 40 MG/G
CREAM TOPICAL ONCE
Status: COMPLETED | OUTPATIENT
Start: 2023-05-23 | End: 2023-05-23

## 2023-05-23 RX ADMIN — LIDOCAINE: 40 CREAM TOPICAL at 08:43

## 2023-05-23 NOTE — PROGRESS NOTES
Patient ID: Alexandrea Constantino is a 60 y o  male Date of Birth 1960     Diagnosis:  1  Diabetic ulcer of right lower leg associated with type 2 diabetes mellitus, with fat layer exposed ([facility] )  -     lidocaine (LMX) 4 % cream  -     Wound cleansing and dressings; Future  -     Debridement    2  Diabetic ulcer of toe of right foot associated with type 2 diabetes mellitus, limited to breakdown of skin (HCC)  -     lidocaine (LMX) 4 % cream  -     Wound cleansing and dressings; Future    3  Lymphedema of right lower extremity  -     lidocaine (LMX) 4 % cream  -     Wound cleansing and dressings; Future  -     Debridement    4  Type 2 diabetes mellitus with complication, with long-term current use of insulin (HCC)  -     lidocaine (LMX) 4 % cream  -     Wound cleansing and dressings; Future  -     Debridement      Diagnosis ICD-10-CM Associated Orders   1  Diabetic ulcer of right lower leg associated with type 2 diabetes mellitus, with fat layer exposed ([facility] )  E11 622 lidocaine (LMX) 4 % cream    L97 912 Wound cleansing and dressings     Debridement      2  Diabetic ulcer of toe of right foot associated with type 2 diabetes mellitus, limited to breakdown of skin ([facility])  E11 621 lidocaine (LMX) 4 % cream    L97 511 Wound cleansing and dressings      3  Lymphedema of right lower extremity  I89 0 lidocaine (LMX) 4 % cream     Wound cleansing and dressings     Debridement      4  Type 2 diabetes mellitus with complication, with long-term current use of insulin ([facility])  E11 8 lidocaine (LMX) 4 % cream    Z79 4 Wound cleansing and dressings     Debridement             Assessment & Plan:  1  Ankle ulcer improved since inpatient admission  I reviewed his arterial dopplers form last month, his perfusion is adequate for [employer]  He has a recliner but does not elevate the limb enough, stressed elevation throughout the day  2  Acticoat 7 to wound and unna boot  Consent reviewed with patient to treat    3  See debridement below of the ankle  The toe ulcer is superficial, no debridement necessary    -DM foot risk is HIGH  -Discussed DM risk to lower extremities, proper shoe gear, and daily monitoring of feet    -Discussed weight loss and suitable exercise regiment  -Reviewed most recent inpatient H&P, DC summary, and podiatry progress notes from his admission  -Educated on A1C and the risks of poorly controlled Diabetes  Reviewed recent A1C:  Lab Results   Component Value Date    HGBA1C 6 7 (A) 02/02/2023    HGBA1C 7 9 (H) 10/16/2021    HGBA1C 10 5 11/03/2017     Chief Complaint   Patient presents with   • New Patient Visit     Pt presents for new patient visit for wound to right leg  Pt notes wounds started about 6 weeks ago, likely related to compression wrap that slid down  Pt has left BKA rt to diabetic foot ulcer to that side  Subjective:   Patient presents for initial wound care consult  He was admitted to [facility] 4/18/2023 to 5/2/2023 for sepsis from the RLE ankle wound  His compression stocking had bunched around his ankle for a few days  He has no feeling and was unaware the stocking had caused a wound and subsequent infection  HE was admitted for IV antibiotics  MRI was negative for abscess or OM  Local wound care was performed to the wound and IV antibiotics were administered  HE discharged to [facility]  HE has a left BKA from previous DM foot infection  He has known RLE lymphedema and rarely elevates the foot  He has difficulty with day to day care at home and his wife is unable to provide all of his aid  He has difficulty moving around due to his LLE amputation and obesity       His A1c is well controlled but he has severe neuropathy    He also had a small wound on his great toe that was stable while admitted to the hospital  There is still some drainage from the toe      The following portions of the patient's history were reviewed and updated as appropriate:   Patient Active Problem List   Diagnosis   • Anxiety and depression   • Type 2 diabetes mellitus with diabetic neuropathy, with long-term current use of insulin ([facility])   • Hyperlipidemia   • 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   • Vitamin D deficiency   • PAD (peripheral artery disease) ([facility])   • S/P BKA (below knee amputation), left ([facility])   • Orthostatic hypotension   • Diabetic gastroparesis ([facility])   • Class 2 severe obesity due to excess calories with serious comorbidity and body mass index (BMI) of 35 0 to 35 9 in [facility])   • Triple vessel coronary artery disease   • Hypertensive heart disease with congestive heart failure ([facility])   • Chronic diastolic heart failure ([facility])   • Other constipation   • S/P CABG x 3   • Diabetic autonomic neuropathy associated with type 2 diabetes mellitus ([facility])   • Hyponatremia   • Obstructive sleep apnea   • Amputated below knee, left ([facility])   • Phantom limb syndrome without pain ([facility])   • CAD (coronary artery disease)   • Nodular rash   • Elephantiasis nostras verrucosa   • Other acute osteomyelitis, left ankle and foot ([facility] )   • Embolism and thrombosis of arteries of the lower extremities ([facility])   • Lymphedema of right lower extremity   • Venous stasis dermatitis of right lower extremity   • Abdominal distension   • Non-pressure chronic ulcer of right calf with fat layer exposed ([address] )   • Cellulitis of right ankle   • Fracture, toe     Past Medical History:   Diagnosis Date   • Anxiety    • Anxiety and depression    • Cataract    • Congestive cardiac failure ([address] )    • Coronary artery disease    • Depression    • Diabetes mellitus ([facility] )    • Diabetic autonomic neuropathy associated with type 2 diabetes mellitus ([facility] )     Last Assessed: 12/28/2017   • History of DVT (deep vein thrombosis)     left LE   • Hyperlipidemia    • Lymphedema of right lower extremity    • Obesity    • Orthostatic hypotension      Past Surgical History:   Procedure Laterality Date   • CORONARY ARTERY BYPASS GRAFT • EYE SURGERY      cataracts bilateral   • LEG AMPUTATION THROUGH LOWER TIBIA AND FIBULA Left 8/3/2018    Procedure: AMPUTATION BELOW KNEE (BKA) L BKA;  Surgeon: Perfecto Najera MD;  Location: [facility];  Service: Vascular   • GA CORONARY ARTERY BYP W/VEIN & ARTERY GRAFT 4 VEIN N/A 3/28/2018    Procedure: CORONARY ARTERY BYPASS GRAFT (CABG) x3 VESSELS, LIMA TO LAD, SVG--> PDA, SVG--> OM2,  RIGHT LEG EVH/SVH TO , INTRA-OP AMANDEEP;  Surgeon: Justyn Estrella MD;  Location: [facility];  Service: Cardiac Surgery   • ROTATOR CUFF REPAIR Bilateral      Social History     Socioeconomic History   • Marital status: /Civil Union     Spouse name: Not on file   • Number of children: Not on file   • Years of education: Not on file   • Highest education level: Not on file   Occupational History   • Not on file   Tobacco Use   • Smoking status: Former     Packs/day: 1 00     Years: 12 00     Pack years: 12 00     Types: Cigarettes     Quit date: 3/23/1994     Years since quittin 1   • Smokeless tobacco: Never   Vaping Use   • Vaping Use: Never used   Substance and Sexual Activity   • Alcohol use: No     Comment: rarely   • Drug use: No   • Sexual activity: Not Currently   Other Topics Concern   • Not on file   Social History Narrative   • Not on file     Social Determinants of Health     Financial Resource Strain: Not on file   Food Insecurity: No Food Insecurity   • Worried About Running Out of Food in the Last Year: Never true   • Ran Out of Food in the Last Year: Never true   Transportation Needs: No Transportation Needs   • Lack of Transportation (Medical): No   • Lack of Transportation (Non-Medical):  No   Physical Activity: Not on file   Stress: Not on file   Social Connections: Not on file   Intimate Partner Violence: Not on file   Housing Stability: Low Risk    • Unable to Pay for Housing in the Last Year: No   • Number of Places Lived in the Last Year: 1   • Unstable Housing in the Last Year: No        Current Outpatient Medications:   •  ammonium lactate (LAC-HYDRIN) 12 % lotion, Apply 1 application  topically in the morning, Disp: , Rfl:   •  aspirin (ECOTRIN LOW STRENGTH) 81 mg EC tablet, Take 81 mg by mouth daily (Patient not taking: Reported on 5/23/2023), Disp: , Rfl:   •  atorvastatin (LIPITOR) 80 mg tablet, Take 1 tablet (80 mg total) by mouth daily, Disp: 90 tablet, Rfl: 1  •  cholecalciferol (VITAMIN D3) 1,000 units tablet, Take 1,000 Units by mouth daily, Disp: , Rfl:   •  clopidogrel (PLAVIX) 75 mg tablet, Take 1 tablet (75 mg total) by mouth daily, Disp: 90 tablet, Rfl: 1  •  finasteride (PROSCAR) 5 mg tablet, Take 1 tablet (5 mg total) by mouth daily, Disp: 90 tablet, Rfl: 1  •  furosemide (LASIX) 40 mg tablet, Take 1 tablet (40 mg total) by mouth 2 (two) times a day, Disp: 180 tablet, Rfl: 1  •  glucose blood test strip, ReliOn Prime Meter, Disp: , Rfl:   •  Insulin Glargine Solostar (Lantus SoloStar) 100 UNIT/ML SOPN, Inject 0 4 mL (40 Units total) under the skin 2 (two) times a day (Patient taking differently: Inject 60 Units under the skin 2 (two) times a day), Disp: 135 mL, Rfl: 0  •  metoclopramide (REGLAN) 5 mg tablet, Take 1 tablet (5 mg total) by mouth daily, Disp: 90 tablet, Rfl: 1  •  midodrine (PROAMATINE) 2 5 mg tablet, Take 1 tablet (2 5 mg total) by mouth 3 (three) times a day before meals (Patient not taking: Reported on 5/23/2023), Disp: , Rfl: 0  •  pantoprazole (PROTONIX) 40 mg tablet, Take 1 tablet (40 mg total) by mouth daily in the early morning, Disp: 90 tablet, Rfl: 1  •  tamsulosin (FLOMAX) 0 4 mg, Take 1 capsule (0 4 mg total) by mouth daily with dinner, Disp: 90 capsule, Rfl: 1  No current facility-administered medications for this visit    Family History   Problem Relation Age of Onset   • Atrial fibrillation Mother    • Stroke Mother    • Hypertension Father    • Heart attack Paternal Grandfather 61   • Diabetes Maternal Grandmother    • Diabetes Maternal Grandfather    • Diabetes Maternal Aunt    • Diabetes Maternal Uncle       Review of Systems   Constitutional: generalized weakness  Respiratory: Negative for cough and shortness of breath  Gastrointestinal: Negative for diarrhea, nausea and vomiting  Musculoskeletal: left leg amputation  Skin: wound  Neurological: Neuropathy        Allergies  Metformin    Objective:  /80   Pulse 84   Temp 98 8 °F (37 1 °C) (Tympanic)   Resp 16     Physical Exam  Vitals reviewed  Constitutional:       Appearance: He is obese  Cardiovascular:      Rate and Rhythm: Normal rate  Pulses: no weak pulses          Dorsalis pedis pulses are detected w/ Doppler on the right side  Posterior tibial pulses are detected w/ Doppler on the right side  Pulmonary:      Effort: Pulmonary effort is normal  No respiratory distress  Musculoskeletal:         General: Deformity present  Right foot: Decreased range of motion  Deformity present  No foot drop  Left Lower Extremity: Left leg is amputated below knee  Feet:      Right foot:      Protective Sensation: 10 sites tested  0 sites sensed  Skin integrity: Ulcer, skin breakdown (great toe superficial wound  No sign of infection), dry skin and fissure present  No erythema  Toenail Condition: Right toenails are abnormally thick  Fungal disease present  Left foot: amputated  Skin:     Findings: Rash (stasis dermatitis and lymphedema skin changes) present  Neurological:      Mental Status: He is alert  Diabetic Foot Exam    Patient's shoes and socks removed  Right Foot/Ankle   Right Foot Inspection  Skin Exam: dry skin, maceration (great toe superficial wound  No sign of infection) and ulcer  Skin not intact and no erythema  Toe Exam: swelling and right toe deformity  Sensory   Vibration: absent  Proprioception: absent  Monofilament testing: absent    Vascular  Capillary refills: < 3 seconds  The right DP pulse is detected w/ Doppler   The right PT pulse "is detected w/ Doppler  Left Foot/Ankle  Left Foot Inspection  Amputation: amputation left foot (Comments: BKA)    Assign Risk Category  Deformity present  Loss of protective sensation  No weak pulses  Risk: 3        Wound 05/23/23 Diabetic Ulcer Toe (Comment  which one) Right (Active)   Wound Image   05/23/23 0859   Wound Description Bleeding;Slough; White;Yellow 05/23/23 0834   Anya-wound Assessment Dry;Scaly; Maceration;Callus 05/23/23 0834   Wound Length (cm) 3 cm 05/23/23 0834   Wound Width (cm) 1 8 cm 05/23/23 0834   Wound Depth (cm) 0 1 cm 05/23/23 0834   Wound Surface Area (cm^2) 5 4 cm^2 05/23/23 0834   Wound Volume (cm^3) 0 54 cm^3 05/23/23 0834   Calculated Wound Volume (cm^3) 0 54 cm^3 05/23/23 0834   Drainage Amount Moderate 05/23/23 0834   Drainage Description Serosanguineous 05/23/23 0834   Treatments Irrigation with NSS 05/23/23 0834   Patient Tolerance Tolerated well 05/23/23 0834       Wound 05/23/23 Diabetic Ulcer Ankle Right;Medial (Active)   Wound Image   05/23/23 0859   Wound Description Yellow;Slough;Pink 05/23/23 0836   Anya-wound Assessment Scaly;Dry;Callus; Other (Comment) 05/23/23 0836   Wound Length (cm) 0 8 cm 05/23/23 0836   Wound Width (cm) 2 8 cm 05/23/23 0836   Wound Depth (cm) 0 2 cm 05/23/23 0836   Wound Surface Area (cm^2) 2 24 cm^2 05/23/23 0836   Wound Volume (cm^3) 0 448 cm^3 05/23/23 0836   Calculated Wound Volume (cm^3) 0 45 cm^3 05/23/23 0836   Drainage Amount Moderate 05/23/23 0836   Drainage Description Tan;Yellow;Green 05/23/23 0836   Patient Tolerance Tolerated well 05/23/23 0836   Dressing Status Removed 05/23/23 0836                             Debridement   Universal Protocol:  Consent: Verbal consent obtained    Risks and benefits: risks, benefits and alternatives were discussed  Consent given by: patient  Time out: Immediately prior to procedure a \"time out\" was called to verify the correct patient, procedure, equipment, support staff and site/side marked as " required  Timeout called at: 5/23/2023 9:21 AM   Patient understanding: patient states understanding of the procedure being performed  Patient identity confirmed: verbally with patient      Performed by: physician  Debridement type: surgical  Level of debridement: subcutaneous tissue  Pain control: lidocaine 4%  Post-debridement measurements  Length (cm): 1  Width (cm): 3  Depth (cm): 0 4  Percent debrided: 100%  Surface Area (cm^2): 3  Area debrided (cm^2): 3  Volume (cm^3): 1 2  Tissue and other material debrided: adipose, dermis, epidermis and subcutaneous tissue  Devitalized tissue debrided: biofilm, clots, exudate, fibrin, necrotic debris, slough and eschar  Instrument(s) utilized: blade  Bleeding: medium  Hemostasis obtained with: silver nitrate  Procedural pain (0-10): insensate  Post-procedural pain: insensate                    Results from last 6 Months   Lab Units 04/17/23  6894   WOUND CULTURE  2+ Growth of - Pantoea septica Pantoea species*         Wound Instructions:  Orders Placed This Encounter   Procedures   • Wound cleansing and dressings     Wash your hands with soap and water  Remove old dressing, discard into plastic bag and place in trash  Cleanse the wound with Mild Soap & Water prior to applying a clean dressing  Do not use tissue or cotton balls  Do not scrub the wound  Pat dry using gauze  Shower no     Right Great Toe:  Apply betadine paint  Cover with gauze  Secure with tape  Change daily    Right Ankle:  Apply Acticoat 7 to the wound  Cover with gauze & abd  Secure with Unna Boot  Change dressing weekly    Please try to consume 3-4 servings of protein (30g) each day  Follow up in [address] [place] [address] in 1 weeks    Today's Treatment:  Dr Iva Fishman debrided your wounds & applied silver nitrate  This will give the wound a grey appearance this is normal   Wounds were cleansed with normal saline & redressed as ordered above       Standing Status:   Future "Standing Expiration Date:   5/23/2024   • Debridement     This order was created via procedure documentation         Patricia Woodruff DPM    Portions of the record may have been created with voice recognition software  Occasional wrong word or \"sound a like\" substitutions may have occurred due to the inherent limitations of voice recognition software  Read the chart carefully and recognize, using context, where substitutions have occurred            "

## 2023-05-23 NOTE — PATIENT INSTRUCTIONS
Orders Placed This Encounter   Procedures    Wound cleansing and dressings     Wash your hands with soap and water  Remove old dressing, discard into plastic bag and place in trash  Cleanse the wound with Mild Soap & Water prior to applying a clean dressing  Do not use tissue or cotton balls  Do not scrub the wound  Pat dry using gauze  Shower no     Right Great Toe:  Apply betadine paint  Cover with gauze  Secure with tape  Change daily    Right Ankle:  Apply Acticoat 7 to the wound  Cover with gauze & abd  Secure with Unna Boot  Change dressing weekly    Please try to consume 3-4 servings of protein (30g) each day  Follow up in [address] [place] [address] in 1 weeks    Today's Treatment:  Dr Juan Carlos Kitchen debrided your wounds & applied silver nitrate  This will give the wound a grey appearance this is normal   Wounds were cleansed with normal saline & redressed as ordered above       Standing Status:   Future     Standing Expiration Date:   5/23/2024

## 2023-05-30 ENCOUNTER — OFFICE VISIT (OUTPATIENT)
Dept: WOUND CARE | Facility: CLINIC | Age: 63
End: 2023-05-30

## 2023-05-30 VITALS
RESPIRATION RATE: 18 BRPM | SYSTOLIC BLOOD PRESSURE: 110 MMHG | DIASTOLIC BLOOD PRESSURE: 72 MMHG | HEART RATE: 60 BPM | TEMPERATURE: 97 F

## 2023-05-30 DIAGNOSIS — L97.511 DIABETIC ULCER OF TOE OF RIGHT FOOT ASSOCIATED WITH TYPE 2 DIABETES MELLITUS, LIMITED TO BREAKDOWN OF SKIN (HCC): ICD-10-CM

## 2023-05-30 DIAGNOSIS — E11.621 DIABETIC ULCER OF TOE OF RIGHT FOOT ASSOCIATED WITH TYPE 2 DIABETES MELLITUS, LIMITED TO BREAKDOWN OF SKIN (HCC): ICD-10-CM

## 2023-05-30 DIAGNOSIS — E11.622 DIABETIC ULCER OF RIGHT LOWER LEG ASSOCIATED WITH TYPE 2 DIABETES MELLITUS, WITH FAT LAYER EXPOSED (HCC): Primary | ICD-10-CM

## 2023-05-30 DIAGNOSIS — L97.912 DIABETIC ULCER OF RIGHT LOWER LEG ASSOCIATED WITH TYPE 2 DIABETES MELLITUS, WITH FAT LAYER EXPOSED (HCC): Primary | ICD-10-CM

## 2023-05-30 DIAGNOSIS — S99.821A TRAUMATIC FRACTURE OF TOENAIL OF RIGHT FOOT, INITIAL ENCOUNTER: ICD-10-CM

## 2023-05-30 RX ORDER — LIDOCAINE 40 MG/G
CREAM TOPICAL ONCE
Status: COMPLETED | OUTPATIENT
Start: 2023-05-30 | End: 2023-05-30

## 2023-05-30 RX ADMIN — LIDOCAINE: 40 CREAM TOPICAL at 11:03

## 2023-05-30 NOTE — PATIENT INSTRUCTIONS
Orders Placed This Encounter   Procedures    Wound cleansing and dressings     Wash your hands with soap and water  Remove old dressing, discard into plastic bag and place in trash  Cleanse the wound with normal saline prior to applying a clean dressing  Do not use tissue or cotton balls  Do not scrub the wound  Pat dry using gauze  Shower no      Right Great Toe:  Cleanse as above  Apply betadine paint  Cover with gauze  Secure with tape  Change  weekly at wound center    Right Ankle:  Cleanse as above  Apply Acticoat 7 followed by calcium alginate to the wound  Cover with gauze & abd  Secure with antonia and tape  Apply Unna Boot and coban  Change dressing weekly    R 5th toe:  Cleanse as above  Apply bacitracin ointment  Cover with gauze and secure with antonia and tape  Change weekly in wound center     Surgical shoe to right foot at all times except when sleeping    Continue 3-4 servings of protein (30g) in your diet each day  Follow up in [address] [place] [address] in 1 week     Today's Treatment:  Wounds were cleansed with normal saline & redressed as ordered above       Standing Status:   Future     Standing Expiration Date:   5/30/2024    Debridement     This order was created via procedure documentation

## 2023-05-30 NOTE — PROGRESS NOTES
"Patient ID: Zechariah Duarte is a 60 y o  male Date of Birth 1960     Diagnosis:  1  Diabetic ulcer of right lower leg associated with type 2 diabetes mellitus, with fat layer exposed ([facility] [address] )  -     Debridement    2  Diabetic ulcer of toe of right foot associated with type 2 diabetes mellitus, limited to breakdown of skin ([facility] )    3  Traumatic fracture of toenail of right foot, initial encounter  Comments:  occurred this morning before he left for his appt    Bleeding controlled with pressure  The nail was fully detached, no laceration  Bacitracin and gauze applied       Diagnosis ICD-10-CM Associated Orders   1  Diabetic ulcer of right lower leg associated with type 2 diabetes mellitus, with fat layer exposed ([facility] [address] )  D73 187 Debridement    L97 912       2  Diabetic ulcer of toe of right foot associated with type 2 diabetes mellitus, limited to breakdown of skin ([facility] [address] )  E11 621     L97 511       3  Traumatic fracture of toenail of right foot, initial encounter  [id number]     occurred this morning before he left for his appt    Bleeding controlled with pressure  The nail was fully detached, no laceration  Bacitracin and gauze applied           Assessment & Plan:  1  Ankle ulcer debrided  IT is doing well with unna boot, elevation, compression and LWC  2  Great toe is basically healed  Can betadyne over the eschar area  3  5th toe traumatic nail avulsion  Bleeding controlled with pressure  The nail was torn off completely  Bacitracin and DSD  No need for antibiotics  Chief Complaint   Patient presents with   • Follow Up Wound Care Visit     Right medial ankle wound  Arrived with [facility] intact to RLE  Arrived with active bleeding from R 5th toe  Toenail is loose and bleeding- patient does not know how this happened  Subjective:   Patient arrives for DM ulcer care to right ankle and great toe  His 5th toenail is bleeding but he has \"no idea what caused it  \"      The following portions of the patient's history " were reviewed and updated as appropriate:   Patient Active Problem List   Diagnosis   • Anxiety and depression   • Type 2 diabetes mellitus with diabetic neuropathy, with long-term current use of insulin ([facility] [address] [address] )   • Hyperlipidemia   • 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   • Vitamin D deficiency   • PAD (peripheral artery disease) ([facility])   • S/P BKA (below knee amputation), left ([facility])   • Orthostatic hypotension   • Diabetic gastroparesis ([facility])   • Class 2 severe obesity due to excess calories with serious comorbidity and body mass index (BMI) of 35 0 to 35 9 in [facility])   • Triple vessel coronary artery disease   • Hypertensive heart disease with congestive heart failure ([facility])   • Chronic diastolic heart failure ([facility])   • Other constipation   • S/P CABG x 3   • Diabetic autonomic neuropathy associated with type 2 diabetes mellitus ([facility])   • Hyponatremia   • Obstructive sleep apnea   • Amputated below knee, left ([facility])   • Phantom limb syndrome without pain ([facility])   • CAD (coronary artery disease)   • Nodular rash   • Elephantiasis nostras verrucosa   • Other acute osteomyelitis, left ankle and foot ([facility])   • Embolism and thrombosis of arteries of the lower extremities ([facility])   • Lymphedema of right lower extremity   • Venous stasis dermatitis of right lower extremity   • Abdominal distension   • Non-pressure chronic ulcer of right calf with fat layer exposed ([facility] )   • Cellulitis of right ankle   • Fracture, toe     Past Medical History:   Diagnosis Date   • Anxiety    • Anxiety and depression    • Cataract    • Congestive cardiac failure ([facility] )    • Coronary artery disease    • Depression    • Diabetes mellitus ([facility] )    • Diabetic autonomic neuropathy associated with type 2 diabetes mellitus ([facility] )     Last Assessed: 12/28/2017   • History of DVT (deep vein thrombosis)     left LE   • Hyperlipidemia    • Lymphedema of right lower extremity    • Obesity    • Orthostatic hypotension      Past Surgical History:   Procedure Laterality Date   • CORONARY ARTERY BYPASS GRAFT     • EYE SURGERY      cataracts bilateral   • LEG AMPUTATION THROUGH LOWER TIBIA AND FIBULA Left 8/3/2018    Procedure: AMPUTATION BELOW KNEE (BKA) L BKA;  Surgeon: Michele Muller MD;  Location: [facility];  Service: Vascular   • VT CORONARY ARTERY BYP W/VEIN & ARTERY GRAFT 4 VEIN N/A 3/28/2018    Procedure: CORONARY ARTERY BYPASS GRAFT (CABG) x3 VESSELS, LIMA TO LAD, SVG--> PDA, SVG--> OM2,  RIGHT LEG EVH/SVH TO , INTRA-OP AMANDEEP;  Surgeon: Ashlyn Gonzales MD;  Location: [facility];  Service: Cardiac Surgery   • ROTATOR CUFF REPAIR Bilateral      Social History     Socioeconomic History   • Marital status: /Civil Union     Spouse name: None   • Number of children: None   • Years of education: None   • Highest education level: None   Occupational History   • None   Tobacco Use   • Smoking status: Former     Packs/day: 1 00     Years: 12 00     Total pack years: 12 00     Types: Cigarettes     Quit date: 3/23/1994     Years since quittin 2   • Smokeless tobacco: Never   Vaping Use   • Vaping Use: Never used   Substance and Sexual Activity   • Alcohol use: No     Comment: rarely   • Drug use: No   • Sexual activity: Not Currently   Other Topics Concern   • None   Social History Narrative   • None     Social Determinants of Health     Financial Resource Strain: Not on file   Food Insecurity: No Food Insecurity (2023)    Hunger Vital Sign    • Worried About Running Out of Food in the Last Year: Never true    • Ran Out of Food in the Last Year: Never true   Transportation Needs: No Transportation Needs (2023)    PRAPARE - Transportation    • Lack of Transportation (Medical): No    • Lack of Transportation (Non-Medical): No   Physical Activity: Not on file   Stress: Not on file   Social Connections:  Moderately Isolated (10/29/2020)    Social Connection and Isolation Panel [NHANES]    • Frequency of Communication with Friends and Family: Three times a week    • Frequency of Social Gatherings with Friends and Family: Three times a week    • Attends Islam Services: Never    • Active Member of Clubs or Organizations: No    • Attends Club or Organization Meetings: Never    • Marital Status:    Intimate Partner Violence: Not At Risk (10/29/2020)    Humiliation, Afraid, Rape, and Kick questionnaire    • Fear of Current or Ex-Partner: No    • Emotionally Abused: No    • Physically Abused: No    • Sexually Abused: No   Housing Stability: Low Risk  (4/19/2023)    Housing Stability Vital Sign    • Unable to Pay for Housing in the Last Year: No    • Number of [place] in the Last Year: 1    • Unstable Housing in the Last Year: No        Current Outpatient Medications:   •  ammonium lactate (LAC-HYDRIN) 12 % lotion, Apply 1 application   topically in the morning, Disp: , Rfl:   •  aspirin (ECOTRIN LOW STRENGTH) 81 mg EC tablet, Take 81 mg by mouth daily (Patient not taking: Reported on 5/23/2023), Disp: , Rfl:   •  atorvastatin (LIPITOR) 80 mg tablet, Take 1 tablet (80 mg total) by mouth daily, Disp: 90 tablet, Rfl: 1  •  cholecalciferol (VITAMIN D3) 1,000 units tablet, Take 1,000 Units by mouth daily, Disp: , Rfl:   •  clopidogrel (PLAVIX) 75 mg tablet, Take 1 tablet (75 mg total) by mouth daily, Disp: 90 tablet, Rfl: 1  •  finasteride (PROSCAR) 5 mg tablet, Take 1 tablet (5 mg total) by mouth daily, Disp: 90 tablet, Rfl: 1  •  furosemide (LASIX) 40 mg tablet, Take 1 tablet (40 mg total) by mouth 2 (two) times a day, Disp: 180 tablet, Rfl: 1  •  glucose blood test strip, ReliOn Prime Meter, Disp: , Rfl:   •  Insulin Glargine Solostar (Lantus SoloStar) 100 UNIT/ML SOPN, Inject 0 4 mL (40 Units total) under the skin 2 (two) times a day (Patient taking differently: Inject 60 Units under the skin 2 (two) times a day), Disp: 135 mL, Rfl: 0  •  metoclopramide (REGLAN) 5 mg tablet, Take 1 tablet (5 mg total) by mouth daily, Disp: 90 tablet, Rfl: 1  •  midodrine (PROAMATINE) 2 5 mg tablet, Take 1 tablet (2 5 mg total) by mouth 3 (three) times a day before meals (Patient not taking: Reported on 5/23/2023), Disp: , Rfl: 0  •  pantoprazole (PROTONIX) 40 mg tablet, Take 1 tablet (40 mg total) by mouth daily in the early morning, Disp: 90 tablet, Rfl: 1  •  tamsulosin (FLOMAX) 0 4 mg, Take 1 capsule (0 4 mg total) by mouth daily with dinner, Disp: 90 capsule, Rfl: 1  Family History   Problem Relation Age of Onset   • Atrial fibrillation Mother    • Stroke Mother    • Hypertension Father    • Heart attack Paternal Grandfather 61   • Diabetes Maternal Grandmother    • Diabetes Maternal Grandfather    • Diabetes Maternal Aunt    • Diabetes Maternal Uncle       Review of Systems   Constitutional: Negative  Respiratory: Negative for shortness of breath  Cardiovascular: Positive for leg swelling  Musculoskeletal: Positive for gait problem  Skin: Positive for color change and wound  Neurological: Positive for numbness  Allergies:  Metformin      Objective:  /72   Pulse 60   Temp (!) 97 °F (36 1 °C)   Resp 18     Physical Exam  Vitals reviewed  Constitutional:       Appearance: He is obese  He is not ill-appearing or diaphoretic  Pulmonary:      Effort: No respiratory distress  Musculoskeletal:      Right lower leg: Edema present  Left lower leg: Edema present  Feet:    Skin:     Findings: Rash present  Neurological:      Mental Status: He is alert               Wound 05/23/23 Diabetic Ulcer Toe (Comment  which one) Right (Active)   Wound Image   05/30/23 0959   Wound Description Epithelialization;Pink 05/30/23 1009   Anya-wound Assessment Dry;Scaly;Callus 05/30/23 1009   Wound Length (cm) 2 5 cm 05/30/23 1009   Wound Width (cm) 2 3 cm 05/30/23 1009   Wound Depth (cm) 0 1 cm 05/30/23 1009   Wound Surface Area (cm^2) 5 75 cm^2 05/30/23 1009   Wound Volume (cm^3) 0 575 cm^3 05/30/23 1009 "  Calculated Wound Volume (cm^3) 0 58 cm^3 05/30/23 1009   Change in Wound Size % -7 41 05/30/23 1009   Drainage Amount Small 05/30/23 1009   Drainage Description Serous 05/30/23 1009   Treatments Irrigation with NSS 05/23/23 0834   Patient Tolerance Tolerated well 05/23/23 0834       Wound 05/23/23 Diabetic Ulcer Ankle Right;Medial (Active)   Wound Image   05/30/23 1026   Wound Description Yellow;Slough;[place] 05/30/23 1010   Anya-wound Assessment Scaly;Dry 05/30/23 1010   Wound Length (cm) 0 7 cm 05/30/23 1010   Wound Width (cm) 2 8 cm 05/30/23 1010   Wound Depth (cm) 0 2 cm 05/30/23 1010   Wound Surface Area (cm^2) 1 96 cm^2 05/30/23 1010   Wound Volume (cm^3) 0 392 cm^3 05/30/23 1010   Calculated Wound Volume (cm^3) 0 39 cm^3 05/30/23 1010   Change in Wound Size % 13 33 05/30/23 1010   Drainage Amount Moderate 05/30/23 1010   Drainage Description Brown;Serosanguineous 05/30/23 1010   Non-staged Wound Description Full thickness 05/30/23 1010   Patient Tolerance Tolerated well 05/23/23 0836   Dressing Status Removed 05/23/23 0836       Wound 05/30/23 Traumatic Toe (Comment  which one) Anterior;Right (Active)   Wound Image   05/30/23 1000                         Debridement   Wound 05/23/23 Diabetic Ulcer Ankle Right;Medial    Universal Protocol:  Consent: Verbal consent obtained  Risks and benefits: risks, benefits and alternatives were discussed  Consent given by: patient  Time out: Immediately prior to procedure a \"time out\" was called to verify the correct patient, procedure, equipment, support staff and site/side marked as required    Timeout called at: 5/30/2023 10:30 AM   Patient understanding: patient states understanding of the procedure being performed  Patient identity confirmed: verbally with patient      Performed by: physician  Debridement type: surgical  Level of debridement: subcutaneous tissue  Pain control: lidocaine 4%  Post-debridement measurements  Length (cm): 1  Width (cm): 3  Depth (cm): " "0 4  Percent debrided: 100%  Surface Area (cm^2): 3  Area debrided (cm^2): 3  Volume (cm^3): 1 2  Tissue and other material debrided: adipose, dermis, epidermis and subcutaneous tissue  Devitalized tissue debrided: biofilm, clots, exudate, fibrin, necrotic debris, slough and eschar  Instrument(s) utilized: blade  Bleeding: medium  Hemostasis obtained with: silver nitrate  Procedural pain (0-10): insensate  Post-procedural pain: insensate   Response to treatment: procedure was tolerated well                   Results from last 6 Months   Lab Units 04/17/23 2139   WOUND CULTURE  2+ Growth of - Pantoea septica Pantoea species*           Wound Instructions:  Orders Placed This Encounter   Procedures   • Debridement     This order was created via procedure documentation         Devin Rodriguez DPM      Portions of the record may have been created with voice recognition software  Occasional wrong word or \"sound a like\" substitutions may have occurred due to the inherent limitations of voice recognition software  Read the chart carefully and recognize, using context, where substitutions have occurred        "

## 2023-06-06 ENCOUNTER — OFFICE VISIT (OUTPATIENT)
Dept: WOUND CARE | Facility: CLINIC | Age: 63
End: 2023-06-06
Payer: COMMERCIAL

## 2023-06-06 VITALS
RESPIRATION RATE: 14 BRPM | SYSTOLIC BLOOD PRESSURE: 108 MMHG | HEART RATE: 76 BPM | TEMPERATURE: 95.8 F | DIASTOLIC BLOOD PRESSURE: 66 MMHG

## 2023-06-06 DIAGNOSIS — E11.622 DIABETIC ULCER OF RIGHT LOWER LEG ASSOCIATED WITH TYPE 2 DIABETES MELLITUS, WITH FAT LAYER EXPOSED (HCC): Primary | ICD-10-CM

## 2023-06-06 DIAGNOSIS — E11.621 DIABETIC ULCER OF TOE OF RIGHT FOOT ASSOCIATED WITH TYPE 2 DIABETES MELLITUS, LIMITED TO BREAKDOWN OF SKIN (HCC): ICD-10-CM

## 2023-06-06 DIAGNOSIS — S99.821A TRAUMATIC FRACTURE OF TOENAIL OF RIGHT FOOT, INITIAL ENCOUNTER: ICD-10-CM

## 2023-06-06 DIAGNOSIS — L97.912 DIABETIC ULCER OF RIGHT LOWER LEG ASSOCIATED WITH TYPE 2 DIABETES MELLITUS, WITH FAT LAYER EXPOSED (HCC): Primary | ICD-10-CM

## 2023-06-06 DIAGNOSIS — L97.511 DIABETIC ULCER OF TOE OF RIGHT FOOT ASSOCIATED WITH TYPE 2 DIABETES MELLITUS, LIMITED TO BREAKDOWN OF SKIN (HCC): ICD-10-CM

## 2023-06-06 PROCEDURE — 11042 DBRDMT SUBQ TIS 1ST 20SQCM/<: CPT | Performed by: PODIATRIST

## 2023-06-06 PROCEDURE — 99213 OFFICE O/P EST LOW 20 MIN: CPT | Performed by: PODIATRIST

## 2023-06-06 NOTE — PROGRESS NOTES
Patient ID: Kiaar Mercado is a 60 y o  male Date of Birth 1960     Diagnosis:  1  Diabetic ulcer of right lower leg associated with type 2 diabetes mellitus, with fat layer exposed ([facility] [address] )  Comments:  see debridement  Continue unna boot and elevation  Orders:  -     Debridement  -     Wound cleansing and dressings; Future    2  Diabetic ulcer of toe of right foot associated with type 2 diabetes mellitus, limited to breakdown of skin ([facility] [address] )  Comments:  minor, betadyne paint, elevate limb  Orders:  -     Wound cleansing and dressings; Future    3  Traumatic fracture of toenail of right foot, initial encounter  Comments:  healed       Diagnosis ICD-10-CM Associated Orders   1  Diabetic ulcer of right lower leg associated with type 2 diabetes mellitus, with fat layer exposed ([facility] [address] )  Y47 268 Debridement    L97 912 Wound cleansing and dressings    see debridement  Continue unna boot and elevation  2  Diabetic ulcer of toe of right foot associated with type 2 diabetes mellitus, limited to breakdown of skin ([facility])  E11 621 Wound cleansing and dressings    L97 511     minor, betadyne paint, elevate limb      3  Traumatic fracture of toenail of right foot, initial encounter  G97 [id number]     healed           Assessment & Plan:  See plan listed under each diagnosis above    Chief Complaint   Patient presents with   • Follow Up Wound Care Visit     Right ankle wound           Subjective:   Right ankle DM ulcer with unna boot therapy  The recent tonail avulsion is healed         The following portions of the patient's history were reviewed and updated as appropriate:   Patient Active Problem List   Diagnosis   • Anxiety and depression   • Type 2 diabetes mellitus with diabetic neuropathy, with long-term current use of insulin ([facility] [address] )   • Hyperlipidemia   • 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   • Vitamin D deficiency   • PAD (peripheral artery disease) ([facility])   • S/P BKA (below knee amputation), left (HCC)   • Orthostatic hypotension   • Diabetic gastroparesis (HCC)   • Class 2 severe obesity due to excess calories with serious comorbidity and body mass index (BMI) of 35 0 to 35 9 in adult [facility])   • Triple vessel coronary artery disease   • Hypertensive heart disease with congestive heart failure (HCC)   • Chronic diastolic heart failure (HCC)   • Other constipation   • S/P CABG x 3   • Diabetic autonomic neuropathy associated with type 2 diabetes mellitus (HCC)   • Hyponatremia   • Obstructive sleep apnea   • Amputated below knee, left (HCC)   • Phantom limb syndrome without pain (HCC)   • CAD (coronary artery disease)   • Nodular rash   • Elephantiasis nostras verrucosa   • Other acute osteomyelitis, left ankle and foot ([facility])   • Embolism and thrombosis of arteries of the lower extremities ([facility])   • Lymphedema of right lower extremity   • Venous stasis dermatitis of right lower extremity   • Abdominal distension   • Non-pressure chronic ulcer of right calf with fat layer exposed ([facility] [address] )   • Cellulitis of right ankle   • Fracture, toe     Past Medical History:   Diagnosis Date   • Anxiety    • Anxiety and depression    • Cataract    • Congestive cardiac failure ([address] )    • Coronary artery disease    • Depression    • Diabetes mellitus ([address] )    • Diabetic autonomic neuropathy associated with type 2 diabetes mellitus ([facility] [address] )     Last Assessed: 12/28/2017   • History of DVT (deep vein thrombosis)     left LE   • Hyperlipidemia    • Lymphedema of right lower extremity    • Obesity    • Orthostatic hypotension      Past Surgical History:   Procedure Laterality Date   • CORONARY ARTERY BYPASS GRAFT     • EYE SURGERY      cataracts bilateral   • LEG AMPUTATION THROUGH LOWER TIBIA AND FIBULA Left 8/3/2018    Procedure: AMPUTATION BELOW KNEE (BKA) L BKA;  Surgeon: Randa Hatch MD;  Location: [facility];  Service: Vascular   • SC CORONARY ARTERY BYP W/VEIN & ARTERY GRAFT 4 VEIN N/A 3/28/2018 Procedure: CORONARY ARTERY BYPASS GRAFT (CABG) x3 VESSELS, LIMA TO LAD, SVG--> PDA, SVG--> OM2,  RIGHT LEG EVH/SVH TO , INTRA-OP AMANDEEP;  Surgeon: Mirna Pop MD;  Location: [facility];  Service: Cardiac Surgery   • ROTATOR CUFF REPAIR Bilateral      Social History     Socioeconomic History   • Marital status: /Civil Union     Spouse name: None   • Number of children: None   • Years of education: None   • Highest education level: None   Occupational History   • None   Tobacco Use   • Smoking status: Former     Packs/day: 1 00     Years: 12      Total pack years: 12 00     Types: Cigarettes     Quit date: 3/23/1994     Years since quittin 2   • Smokeless tobacco: Never   Vaping Use   • Vaping Use: Never used   Substance and Sexual Activity   • Alcohol use: No     Comment: rarely   • Drug use: No   • Sexual activity: Not Currently   Other Topics Concern   • None   Social History Narrative   • None     Social Determinants of Health     Financial Resource Strain: Not on file   Food Insecurity: No Food Insecurity (2023)    Hunger Vital Sign    • Worried About Running Out of Food in the Last Year: Never true    • Ran Out of Food in the Last Year: Never true   Transportation Needs: No Transportation Needs (2023)    PRAPARE - Transportation    • Lack of Transportation (Medical): No    • Lack of Transportation (Non-Medical): No   Physical Activity: Not on file   Stress: Not on file   Social Connections: Moderately Isolated (10/29/2020)    Social Connection and Isolation Panel [NHANES]    • Frequency of Communication with Friends and Family: Three times a week    • Frequency of Social Gatherings with Friends and Family:  Three times a week    • Attends [employer] Services: Never    • Active Member of Clubs or Organizations: No    • Attends Club or Organization Meetings: Never    • Marital Status:    Intimate Partner Violence: Not At Risk (10/29/2020)    Humiliation, Afraid, Rape, and Kick questionnaire    • Fear of Current or Ex-Partner: No    • Emotionally Abused: No    • Physically Abused: No    • Sexually Abused: No   Housing Stability: Low Risk  (4/19/2023)    Housing Stability Vital Sign    • Unable to Pay for Housing in the Last Year: No    • Number of Places Lived in the Last Year: 1    • Unstable Housing in the Last Year: No        Current Outpatient Medications:   •  ammonium lactate (LAC-HYDRIN) 12 % lotion, Apply 1 application   topically in the morning, Disp: , Rfl:   •  aspirin (ECOTRIN LOW STRENGTH) 81 mg EC tablet, Take 81 mg by mouth daily (Patient not taking: Reported on 5/23/2023), Disp: , Rfl:   •  atorvastatin (LIPITOR) 80 mg tablet, Take 1 tablet (80 mg total) by mouth daily, Disp: 90 tablet, Rfl: 1  •  cholecalciferol (VITAMIN D3) 1,000 units tablet, Take 1,000 Units by mouth daily, Disp: , Rfl:   •  clopidogrel (PLAVIX) 75 mg tablet, Take 1 tablet (75 mg total) by mouth daily, Disp: 90 tablet, Rfl: 1  •  finasteride (PROSCAR) 5 mg tablet, Take 1 tablet (5 mg total) by mouth daily, Disp: 90 tablet, Rfl: 1  •  furosemide (LASIX) 40 mg tablet, TAKE 1 TABLET BY MOUTH TWICE  DAILY, Disp: 200 tablet, Rfl: 2  •  glucose blood test strip, ReliOn Prime Meter, Disp: , Rfl:   •  Insulin Glargine Solostar (Lantus SoloStar) 100 UNIT/ML SOPN, Inject 0 4 mL (40 Units total) under the skin 2 (two) times a day (Patient taking differently: Inject 60 Units under the skin 2 (two) times a day), Disp: 135 mL, Rfl: 0  •  metoclopramide (REGLAN) 5 mg tablet, Take 1 tablet (5 mg total) by mouth daily, Disp: 90 tablet, Rfl: 1  •  midodrine (PROAMATINE) 2 5 mg tablet, Take 1 tablet (2 5 mg total) by mouth 3 (three) times a day before meals (Patient not taking: Reported on 5/23/2023), Disp: , Rfl: 0  •  pantoprazole (PROTONIX) 40 mg tablet, Take 1 tablet (40 mg total) by mouth daily in the early morning, Disp: 90 tablet, Rfl: 1  •  tamsulosin (FLOMAX) 0 4 mg, Take 1 capsule (0 4 mg total) by mouth daily with dinner, Disp: 90 capsule, Rfl: 1  Family History   Problem Relation Age of Onset   • Atrial fibrillation Mother    • Stroke Mother    • Hypertension Father    • Heart attack Paternal Grandfather 61   • Diabetes Maternal Grandmother    • Diabetes Maternal Grandfather    • Diabetes Maternal Aunt    • Diabetes Maternal Uncle       Review of Systems  Allergies:  Metformin      Objective:  /66   Pulse 76   Temp (!) 95 8 °F (35 4 °C)   Resp 14     Physical Exam        Wound 05/23/23 Diabetic Ulcer Toe (Comment  which one) Right (Active)   Wound Image   06/06/23 0932   Wound Description Epithelialization;Pink 06/06/23 0933   Anya-wound Assessment Scaly;Dry 06/06/23 0933   Wound Length (cm) 0 5 cm 06/06/23 0933   Wound Width (cm) 0 3 cm 06/06/23 0933   Wound Depth (cm) 0 1 cm 06/06/23 0933   Wound Surface Area (cm^2) 0 15 cm^2 06/06/23 0933   Wound Volume (cm^3) 0 015 cm^3 06/06/23 0933   Calculated Wound Volume (cm^3) 0 02 cm^3 06/06/23 0933   Change in Wound Size % 96 3 06/06/23 0933   Drainage Amount Scant 06/06/23 0933   Drainage Description Serosanguineous 06/06/23 0933   Treatments Cleansed 06/06/23 0933   Patient Tolerance Tolerated well 05/23/23 0834       Wound 05/23/23 Diabetic Ulcer Ankle Right;Medial (Active)   Wound Image   06/06/23 0929   Wound Description Slough;Pink 06/06/23 0935   Anya-wound Assessment Scaly;Dry 06/06/23 0935   Wound Length (cm) 0 6 cm 06/06/23 0935   Wound Width (cm) 2 3 cm 06/06/23 0935   Wound Depth (cm) 0 3 cm 06/06/23 0935   Wound Surface Area (cm^2) 1 38 cm^2 06/06/23 0935   Wound Volume (cm^3) 0 414 cm^3 06/06/23 0935   Calculated Wound Volume (cm^3) 0 41 cm^3 06/06/23 0935   Change in Wound Size % 8 89 06/06/23 0935   Drainage Amount Small 06/06/23 0935   Drainage Description Serosanguineous 06/06/23 0935   Non-staged Wound Description Full thickness 06/06/23 0935   Treatments Cleansed 06/06/23 0935   Patient Tolerance Tolerated well 05/23/23 0836   Dressing Status "Removed 05/23/23 0836       Wound 05/30/23 Traumatic Toe (Comment  which one) Anterior;Right (Active)   Wound Image   06/06/23 0931   Wound Description Pink 06/06/23 0934   Anya-wound Assessment Intact; Pink 06/06/23 0934   Wound Length (cm) 0 5 cm 06/06/23 0934   Wound Width (cm) 0 7 cm 06/06/23 0934   Wound Depth (cm) 0 1 cm 06/06/23 0934   Wound Surface Area (cm^2) 0 35 cm^2 06/06/23 0934   Wound Volume (cm^3) 0 035 cm^3 06/06/23 0934   Calculated Wound Volume (cm^3) 0 04 cm^3 06/06/23 0934   Change in Wound Size % 0 06/06/23 0934   Drainage Amount Small 06/06/23 0934   Drainage Description Serosanguineous 06/06/23 0934   Treatments Cleansed 06/06/23 0934                         Debridement   Wound 05/23/23 Diabetic Ulcer Ankle Right;Medial    Universal Protocol:  Consent: Verbal consent obtained  Risks and benefits: risks, benefits and alternatives were discussed  Consent given by: patient  Time out: Immediately prior to procedure a \"time out\" was called to verify the correct patient, procedure, equipment, support staff and site/side marked as required  Timeout called at: 6/6/2023 9:49 AM   Patient understanding: patient states understanding of the procedure being performed  Patient identity confirmed: verbally with patient      Performed by: physician  Debridement type: surgical  Level of debridement: subcutaneous tissue  Pain control: lidocaine 4%  Post-debridement measurements  Length (cm): 1  Width (cm): 2 5  Depth (cm): 0 4  Percent debrided: 100%  Surface Area (cm^2): 2 5  Area debrided (cm^2): 2 5  Volume (cm^3):  1  Tissue and other material debrided: dermis, epidermis and subcutaneous tissue  Devitalized tissue debrided: biofilm, exudate, fibrin, necrotic debris, slough and eschar  Instrument(s) utilized: blade  Bleeding: medium  Hemostasis obtained with: pressure  Procedural pain (0-10): insensate  Post-procedural pain: insensate   Response to treatment: procedure was tolerated well           Results " "from last 6 Months   Lab Units 04/17/23 2139   WOUND CULTURE  2+ Growth of - Pantoea septica Pantoea species*           Wound Instructions:  Orders Placed This Encounter   Procedures   • Debridement     This order was created via procedure documentation   • Wound cleansing and dressings     Shower no  Do not get dressing wet       Right Great toe and 5th toe wounds:  Apply betadine and leave open to air         Right Ankle:  Cleanse as above  Apply Acticoat 7 followed by calcium alginate to the wound  Cover with gauze & abd  Secure with antonia and tape  Apply Unna Boot and coban  Change dressing weekly at the wound center  If you develop severe pain in your right lower leg, or your toes turn blue/purple, unwrap compression wrap on leg and call the wound center  If you're unable to reach the wound center, go to the nearest ER        Surgical shoe to right foot at all times except when sleeping     Continue 3-4 servings of protein (30g) in your diet each day      Follow up in [address] [place] [address] in 1 week     Today's Treatment:  Wounds were cleansed with soap and water & redressed as ordered above  Standing Status:   Future     Standing Expiration Date:   6/6/2024         Pascale Allen DPM      Portions of the record may have been created with voice recognition software  Occasional wrong word or \"sound a like\" substitutions may have occurred due to the inherent limitations of voice recognition software  Read the chart carefully and recognize, using context, where substitutions have occurred        "

## 2023-06-06 NOTE — PATIENT INSTRUCTIONS
Orders Placed This Encounter   Procedures    Debridement     This order was created via procedure documentation    Wound cleansing and dressings     Shower no  Do not get dressing wet  Right Great toe and 5th toe wounds:  Apply betadine and leave open to air  Right Ankle:  Cleanse as above  Apply Acticoat 7 followed by calcium alginate to the wound  Cover with gauze & abd  Secure with antonia and tape  Apply Unna Boot and coban  Change dressing weekly at the wound center  If you develop severe pain in your right lower leg, or your toes turn blue/purple, unwrap compression wrap on leg and call the wound center  If you're unable to reach the wound center, go to the nearest ER  Surgical shoe to right foot at all times except when sleeping     Continue 3-4 servings of protein (30g) in your diet each day  Follow up in [address] [place] [address] in 1 week     Today's Treatment:  Wounds were cleansed with soap and water & redressed as ordered above       Standing Status:   Future     Standing Expiration Date:   6/6/2024

## 2023-06-13 ENCOUNTER — OFFICE VISIT (OUTPATIENT)
Dept: WOUND CARE | Facility: CLINIC | Age: 63
End: 2023-06-13
Payer: COMMERCIAL

## 2023-06-13 VITALS
TEMPERATURE: 95.9 F | DIASTOLIC BLOOD PRESSURE: 72 MMHG | RESPIRATION RATE: 18 BRPM | HEART RATE: 76 BPM | SYSTOLIC BLOOD PRESSURE: 130 MMHG

## 2023-06-13 DIAGNOSIS — L97.511 DIABETIC ULCER OF TOE OF RIGHT FOOT ASSOCIATED WITH TYPE 2 DIABETES MELLITUS, LIMITED TO BREAKDOWN OF SKIN (HCC): ICD-10-CM

## 2023-06-13 DIAGNOSIS — S99.821A TRAUMATIC FRACTURE OF TOENAIL OF RIGHT FOOT, INITIAL ENCOUNTER: ICD-10-CM

## 2023-06-13 DIAGNOSIS — E11.622 DIABETIC ULCER OF RIGHT LOWER LEG ASSOCIATED WITH TYPE 2 DIABETES MELLITUS, WITH FAT LAYER EXPOSED (HCC): Primary | ICD-10-CM

## 2023-06-13 DIAGNOSIS — L97.912 DIABETIC ULCER OF RIGHT LOWER LEG ASSOCIATED WITH TYPE 2 DIABETES MELLITUS, WITH FAT LAYER EXPOSED (HCC): Primary | ICD-10-CM

## 2023-06-13 DIAGNOSIS — E11.621 DIABETIC ULCER OF TOE OF RIGHT FOOT ASSOCIATED WITH TYPE 2 DIABETES MELLITUS, LIMITED TO BREAKDOWN OF SKIN (HCC): ICD-10-CM

## 2023-06-13 PROCEDURE — 97597 DBRDMT OPN WND 1ST 20 CM/<: CPT | Performed by: PODIATRIST

## 2023-06-13 PROCEDURE — 97598 DBRDMT OPN WND ADDL 20CM/<: CPT | Performed by: PODIATRIST

## 2023-06-13 PROCEDURE — 99213 OFFICE O/P EST LOW 20 MIN: CPT | Performed by: PODIATRIST

## 2023-06-13 RX ORDER — LIDOCAINE 40 MG/G
CREAM TOPICAL ONCE
Status: COMPLETED | OUTPATIENT
Start: 2023-06-13 | End: 2023-06-13

## 2023-06-13 RX ADMIN — LIDOCAINE: 40 CREAM TOPICAL at 10:38

## 2023-06-13 NOTE — PROGRESS NOTES
Patient ID: Carla Keita is a 60 y o  male Date of Birth 1960     Diagnosis:  1  Diabetic ulcer of right lower leg associated with type 2 diabetes mellitus, with fat layer exposed ([address] )  -     lidocaine (LMX) 4 % cream  -     Wound cleansing and dressings; Future  -     Debridement    2  Diabetic ulcer of toe of right foot associated with type 2 diabetes mellitus, limited to breakdown of skin ([address] )  Comments:  HEALED  Orders:  -     lidocaine (LMX) 4 % cream  -     Wound cleansing and dressings; Future    3  Traumatic fracture of toenail of right foot, initial encounter  Comments:  HEALED       Diagnosis ICD-10-CM Associated Orders   1  Diabetic ulcer of right lower leg associated with type 2 diabetes mellitus, with fat layer exposed ([address] )  E11 622 lidocaine (LMX) 4 % cream    L97 912 Wound cleansing and dressings     Debridement      2  Diabetic ulcer of toe of right foot associated with type 2 diabetes mellitus, limited to breakdown of skin ([facility])  E11 621 lidocaine (LMX) 4 % cream    L97 511 Wound cleansing and dressings    HEALED      3  Traumatic fracture of toenail of right foot, initial encounter  [id number]     HEALED           Assessment & Plan:  1  Ankle ulcer improving  Continue acticoat 7, alginate and unna boot  Elevate at home as much as possible  2  Selective debridement to ankle wound, see below  3  The 5th toe and great toe are essentially healed  Chief Complaint   Patient presents with   • Follow Up Wound Care Visit     RLE ankle wound  Subjective:   Patient comes to [facility] weekly for unna boot change and wound check, no changes in medical history        The following portions of the patient's history were reviewed and updated as appropriate:   Patient Active Problem List   Diagnosis   • Anxiety and depression   • Type 2 diabetes mellitus with diabetic neuropathy, with long-term current use of insulin ([address] )   • Hyperlipidemia   • 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   • Vitamin D deficiency   • PAD (peripheral artery disease) ([facility])   • S/P BKA (below knee amputation), left (HCC)   • Orthostatic hypotension   • Diabetic gastroparesis (HCC)   • Class 2 severe obesity due to excess calories with serious comorbidity and body mass index (BMI) of 35 0 to 35 9 in adult [facility])   • Triple vessel coronary artery disease   • Hypertensive heart disease with congestive heart failure ([facility])   • Chronic diastolic heart failure (HCC)   • Other constipation   • S/P CABG x 3   • Diabetic autonomic neuropathy associated with type 2 diabetes mellitus ([facility])   • Hyponatremia   • Obstructive sleep apnea   • Amputated below knee, left (HCC)   • Phantom limb syndrome without pain ([facility])   • CAD (coronary artery disease)   • Nodular rash   • Elephantiasis nostras verrucosa   • Other acute osteomyelitis, left ankle and foot ([facility] )   • Embolism and thrombosis of arteries of the lower extremities ([facility])   • Lymphedema of right lower extremity   • Venous stasis dermatitis of right lower extremity   • Abdominal distension   • Non-pressure chronic ulcer of right calf with fat layer exposed ([facility] )   • Cellulitis of right ankle   • Fracture, toe     Past Medical History:   Diagnosis Date   • Anxiety    • Anxiety and depression    • Cataract    • Congestive cardiac failure ([facility] )    • Coronary artery disease    • Depression    • Diabetes mellitus ([facility] )    • Diabetic autonomic neuropathy associated with type 2 diabetes mellitus ([facility] )     Last Assessed: 12/28/2017   • History of DVT (deep vein thrombosis)     left LE   • Hyperlipidemia    • Lymphedema of right lower extremity    • Obesity    • Orthostatic hypotension      Past Surgical History:   Procedure Laterality Date   • CORONARY ARTERY BYPASS GRAFT     • EYE SURGERY      cataracts bilateral   • LEG AMPUTATION THROUGH LOWER TIBIA AND FIBULA Left 8/3/2018    Procedure: AMPUTATION BELOW KNEE (BKA) L BKA;  Surgeon: Jessenia Summers Anat Ellis MD;  Location: [facility];  Service: Vascular   • WI CORONARY ARTERY BYP W/VEIN & ARTERY GRAFT 4 VEIN N/A 3/28/2018    Procedure: CORONARY ARTERY BYPASS GRAFT (CABG) x3 VESSELS, LIMA TO LAD, SVG--> PDA, SVG--> OM2,  RIGHT LEG EVH/SVH TO , INTRA-OP AMANDEEP;  Surgeon: Rodgers Dance, MD;  Location: [facility];  Service: Cardiac Surgery   • ROTATOR CUFF REPAIR Bilateral      Social History     Socioeconomic History   • Marital status: /Civil Union     Spouse name: None   • Number of children: None   • Years of education: None   • Highest education level: None   Occupational History   • None   Tobacco Use   • Smoking status: Former     Packs/day: 1 00     Years: 12 00     Total pack years: 12 00     Types: Cigarettes     Quit date: 3/23/1994     Years since quittin 2   • Smokeless tobacco: Never   Vaping Use   • Vaping Use: Never used   Substance and Sexual Activity   • Alcohol use: No     Comment: rarely   • Drug use: No   • Sexual activity: Not Currently   Other Topics Concern   • None   Social History Narrative   • None     Social Determinants of Health     Financial Resource Strain: Not on file   Food Insecurity: No Food Insecurity (2023)    Hunger Vital Sign    • Worried About Running Out of Food in the Last Year: Never true    • Ran Out of Food in the Last Year: Never true   Transportation Needs: No Transportation Needs (2023)    PRAPARE - Transportation    • Lack of Transportation (Medical): No    • Lack of Transportation (Non-Medical): No   Physical Activity: Not on file   Stress: Not on file   Social Connections: Moderately Isolated (10/29/2020)    Social Connection and Isolation Panel [NHANES]    • Frequency of Communication with Friends and Family: Three times a week    • Frequency of Social Gatherings with Friends and Family:  Three times a week    • Attends Caodaism Services: Never    • Active Member of Clubs or Organizations: No    • Attends Club or Organization Meetings: Never • Marital Status:    Intimate Partner Violence: Not At Risk (10/29/2020)    Humiliation, Afraid, Rape, and Kick questionnaire    • Fear of Current or Ex-Partner: No    • Emotionally Abused: No    • Physically Abused: No    • Sexually Abused: No   Housing Stability: Low Risk  (4/19/2023)    Housing Stability Vital Sign    • Unable to Pay for Housing in the Last Year: No    • Number of Places Lived in the Last Year: 1    • Unstable Housing in the Last Year: No        Current Outpatient Medications:   •  ammonium lactate (LAC-HYDRIN) 12 % lotion, Apply 1 application   topically in the morning, Disp: , Rfl:   •  aspirin (ECOTRIN LOW STRENGTH) 81 mg EC tablet, Take 81 mg by mouth daily (Patient not taking: Reported on 5/23/2023), Disp: , Rfl:   •  atorvastatin (LIPITOR) 80 mg tablet, Take 1 tablet (80 mg total) by mouth daily, Disp: 90 tablet, Rfl: 1  •  cholecalciferol (VITAMIN D3) 1,000 units tablet, Take 1,000 Units by mouth daily, Disp: , Rfl:   •  clopidogrel (PLAVIX) 75 mg tablet, Take 1 tablet (75 mg total) by mouth daily, Disp: 90 tablet, Rfl: 1  •  finasteride (PROSCAR) 5 mg tablet, Take 1 tablet (5 mg total) by mouth daily, Disp: 90 tablet, Rfl: 1  •  furosemide (LASIX) 40 mg tablet, TAKE 1 TABLET BY MOUTH TWICE  DAILY, Disp: 200 tablet, Rfl: 2  •  glucose blood test strip, ReliOn Prime Meter, Disp: , Rfl:   •  Insulin Glargine Solostar (Lantus SoloStar) 100 UNIT/ML SOPN, Inject 0 4 mL (40 Units total) under the skin 2 (two) times a day (Patient taking differently: Inject 60 Units under the skin 2 (two) times a day), Disp: 135 mL, Rfl: 0  •  metoclopramide (REGLAN) 5 mg tablet, Take 1 tablet (5 mg total) by mouth daily, Disp: 90 tablet, Rfl: 1  •  midodrine (PROAMATINE) 2 5 mg tablet, Take 1 tablet (2 5 mg total) by mouth 3 (three) times a day before meals (Patient not taking: Reported on 5/23/2023), Disp: , Rfl: 0  •  pantoprazole (PROTONIX) 40 mg tablet, Take 1 tablet (40 mg total) by mouth daily in the early morning, Disp: 90 tablet, Rfl: 1  •  tamsulosin (FLOMAX) 0 4 mg, Take 1 capsule (0 4 mg total) by mouth daily with dinner, Disp: 90 capsule, Rfl: 1    Current Facility-Administered Medications:   •  lidocaine (LMX) 4 % cream, , Topical, Once, Michelle Huntley DPM  Family History   Problem Relation Age of Onset   • Atrial fibrillation Mother    • Stroke Mother    • Hypertension Father    • Heart attack Paternal Grandfather 61   • Diabetes Maternal Grandmother    • Diabetes Maternal Grandfather    • Diabetes Maternal Aunt    • Diabetes Maternal Uncle       Review of Systems  Allergies:  Metformin      Objective:  /72   Pulse 76   Temp (!) 95 9 °F (35 5 °C)   Resp 18     Physical Exam  Vitals reviewed  Constitutional:       Appearance: He is obese  He is not ill-appearing  Cardiovascular:      Pulses:           Dorsalis pedis pulses are detected w/ Doppler on the right side  Feet:      Right foot:      Skin integrity: Ulcer (ulcer ankle smaller in dimensions  no signs of infection  The great toe wound and 5th toe are dry and healed) present               Wound 05/23/23 Diabetic Ulcer Toe (Comment  which one) Right (Active)   Wound Image   06/13/23 1022   Wound Description Epithelialization;Pink 06/06/23 0933   Anya-wound Assessment Scaly;Dry 06/06/23 0933   Wound Length (cm) 0 5 cm 06/06/23 0933   Wound Width (cm) 0 3 cm 06/06/23 0933   Wound Depth (cm) 0 1 cm 06/06/23 0933   Wound Surface Area (cm^2) 0 15 cm^2 06/06/23 0933   Wound Volume (cm^3) 0 015 cm^3 06/06/23 0933   Calculated Wound Volume (cm^3) 0 02 cm^3 06/06/23 0933   Change in Wound Size % 96 3 06/06/23 0933   Drainage Amount Scant 06/06/23 0933   Drainage Description Serosanguineous 06/06/23 0933   Treatments Cleansed 06/06/23 0933   Patient Tolerance Tolerated well 05/23/23 0834       Wound 05/23/23 Diabetic Ulcer Ankle Right;Medial (Active)   Wound Image   06/13/23 1024   Wound Description Slough;Pink 06/06/23 0935   Anya-wound "Assessment Scaly;Dry 06/06/23 0935   Wound Length (cm) 0 6 cm 06/06/23 0935   Wound Width (cm) 2 3 cm 06/06/23 0935   Wound Depth (cm) 0 3 cm 06/06/23 0935   Wound Surface Area (cm^2) 1 38 cm^2 06/06/23 0935   Wound Volume (cm^3) 0 414 cm^3 06/06/23 0935   Calculated Wound Volume (cm^3) 0 41 cm^3 06/06/23 0935   Change in Wound Size % 8 89 06/06/23 0935   Drainage Amount Small 06/06/23 0935   Drainage Description Serosanguineous 06/06/23 0935   Non-staged Wound Description Full thickness 06/06/23 0935   Treatments Cleansed 06/06/23 0935   Patient Tolerance Tolerated well 05/23/23 0836   Dressing Status Removed 05/23/23 0836       Wound 05/30/23 Traumatic Toe (Comment  which one) Anterior;Right (Active)   Wound Image   06/13/23 1023   Wound Description Pink 06/06/23 0934   Anya-wound Assessment Intact; Pink 06/06/23 0934   Wound Length (cm) 0 5 cm 06/06/23 0934   Wound Width (cm) 0 7 cm 06/06/23 0934   Wound Depth (cm) 0 1 cm 06/06/23 0934   Wound Surface Area (cm^2) 0 35 cm^2 06/06/23 0934   Wound Volume (cm^3) 0 035 cm^3 06/06/23 0934   Calculated Wound Volume (cm^3) 0 04 cm^3 06/06/23 0934   Change in Wound Size % 0 06/06/23 0934   Drainage Amount Small 06/06/23 0934   Drainage Description Serosanguineous 06/06/23 0934   Treatments Cleansed 06/06/23 0934                         Debridement   Wound 05/23/23 Diabetic Ulcer Ankle Right;Medial    Universal Protocol:  Consent: Verbal consent obtained  Risks and benefits: risks, benefits and alternatives were discussed  Consent given by: patient  Time out: Immediately prior to procedure a \"time out\" was called to verify the correct patient, procedure, equipment, support staff and site/side marked as required    Timeout called at: 6/13/2023 10:36 AM   Patient understanding: patient states understanding of the procedure being performed  Patient identity confirmed: verbally with patient      Performed by: physician  Debridement type: selective  Pain control: lidocaine " "4%  Post-debridement measurements  Length (cm): 0 6  Width (cm): 2 3  Depth (cm): 0 6  Percent debrided: 100%  Surface Area (cm^2): 1 38  Area debrided (cm^2): 1 38  Volume (cm^3): 0 83  Devitalized tissue debrided: biofilm and slough  Instrument(s) utilized: curette  Bleeding: small  Hemostasis obtained with: pressure  Procedural pain (0-10): insensate  Post-procedural pain: insensate   Response to treatment: procedure was tolerated well           Results from last 6 Months   Lab Units 04/17/23 2139   WOUND CULTURE  2+ Growth of - Pantoea septica Pantoea species*           Wound Instructions:  Orders Placed This Encounter   Procedures   • Wound cleansing and dressings     Shower no  Do not get dressing wet       Right Great toe and 5th toe wounds:  Apply betadine in wound center today only and leave open to air at home       Right Ankle:  Cleanse as above  Apply Acticoat 7 followed by calcium alginate to the wound  Cover with gauze & abd  Secure with antonia and tape  Apply Unna Boot and coban  Change dressing weekly at the wound center         If you develop severe pain in your right lower leg, or your toes tingle or turn blue/purple, unwrap compression wrap on leg and call the wound center  If you're unable to reach the wound center, go to the nearest ER      Surgical shoe to right foot at all times except when sleeping     Continue 3-4 servings of protein (30g) in your diet each day      Follow up in [address] [place] [address] in 1 week     Today's Treatment:  Wounds were cleansed with soap and water & redressed as ordered above  Standing Status:   Future     Standing Expiration Date:   6/13/2024   • Debridement     This order was created via procedure documentation         Kristin Gupta DPM      Portions of the record may have been created with voice recognition software   Occasional wrong word or \"sound a like\" substitutions may have occurred due to the inherent limitations of voice recognition " software  Read the chart carefully and recognize, using context, where substitutions have occurred

## 2023-06-13 NOTE — PATIENT INSTRUCTIONS
Orders Placed This Encounter   Procedures    Wound cleansing and dressings     Shower no  Do not get dressing wet  Right Great toe and 5th toe wounds:  Apply betadine in wound center today only and leave open to air at home  Right Ankle:  Cleanse as above  Apply Acticoat 7 followed by calcium alginate to the wound  Cover with gauze & abd  Secure with antonia and tape  Apply Unna Boot and coban  Change dressing weekly at the wound center  If you develop severe pain in your right lower leg, or your toes tingle or turn blue/purple, unwrap compression wrap on leg and call the wound center  If you're unable to reach the wound center, go to the nearest ER  Surgical shoe to right foot at all times except when sleeping     Continue 3-4 servings of protein (30g) in your diet each day  Follow up in [address] [place] [address] in 1 week     Today's Treatment:  Wounds were cleansed with soap and water & redressed as ordered above       Standing Status:   Future     Standing Expiration Date:   6/13/2024

## 2023-06-20 ENCOUNTER — OFFICE VISIT (OUTPATIENT)
Dept: WOUND CARE | Facility: CLINIC | Age: 63
End: 2023-06-20
Payer: COMMERCIAL

## 2023-06-20 VITALS
RESPIRATION RATE: 16 BRPM | SYSTOLIC BLOOD PRESSURE: 118 MMHG | TEMPERATURE: 97 F | HEART RATE: 60 BPM | DIASTOLIC BLOOD PRESSURE: 72 MMHG

## 2023-06-20 DIAGNOSIS — E11.622 DIABETIC ULCER OF RIGHT LOWER LEG ASSOCIATED WITH TYPE 2 DIABETES MELLITUS, WITH FAT LAYER EXPOSED (HCC): Primary | ICD-10-CM

## 2023-06-20 DIAGNOSIS — L97.912 DIABETIC ULCER OF RIGHT LOWER LEG ASSOCIATED WITH TYPE 2 DIABETES MELLITUS, WITH FAT LAYER EXPOSED (HCC): Primary | ICD-10-CM

## 2023-06-20 PROCEDURE — 99213 OFFICE O/P EST LOW 20 MIN: CPT | Performed by: PODIATRIST

## 2023-06-20 PROCEDURE — 29580 STRAPPING UNNA BOOT: CPT

## 2023-06-20 NOTE — PROGRESS NOTES
Patient ID: Maxim Lim is a 60 y o  male Date of Birth 1960     Diagnosis:  1  Diabetic ulcer of right lower leg associated with type 2 diabetes mellitus, with fat layer exposed ([address] )  -     Wound cleansing and dressings; Future       Diagnosis ICD-10-CM Associated Orders   1  Diabetic ulcer of right lower leg associated with type 2 diabetes mellitus, with fat layer exposed ([address] )  E11 622 Wound cleansing and dressings    L97 [id number]            Assessment & Plan:  1  Ankle ulcer almost healed  One more week of unna boot and he can likely be discharged  No need to debride  The toe wounds are healed  Chief Complaint   Patient presents with   • Follow Up Wound Care Visit     Right toe and right ankle wounds           Subjective:   Patient in unna boot for ankle ulcer  He is doing well         The following portions of the patient's history were reviewed and updated as appropriate:   Patient Active Problem List   Diagnosis   • Anxiety and depression   • Type 2 diabetes mellitus with diabetic neuropathy, with long-term current use of insulin ([address] )   • Hyperlipidemia   • 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   • Vitamin D deficiency   • PAD (peripheral artery disease) ([facility])   • S/P BKA (below knee amputation), left ([facility])   • Orthostatic hypotension   • Diabetic gastroparesis ([facility])   • Class 2 severe obesity due to excess calories with serious comorbidity and body mass index (BMI) of 35 0 to 35 9 in [facility])   • Triple vessel coronary artery disease   • Hypertensive heart disease with congestive heart failure ([facility])   • Chronic diastolic heart failure ([facility])   • Other constipation   • S/P CABG x 3   • Diabetic autonomic neuropathy associated with type 2 diabetes mellitus ([facility])   • Hyponatremia   • Obstructive sleep apnea   • Amputated below knee, left ([facility])   • Phantom limb syndrome without pain ([facility])   • CAD (coronary artery disease)   • Nodular rash   • Elephantiasis nostras verrucosa   • Other acute osteomyelitis, left ankle and foot (HCC)   • Embolism and thrombosis of arteries of the lower extremities (HCC)   • Lymphedema of right lower extremity   • Venous stasis dermatitis of right lower extremity   • Abdominal distension   • Non-pressure chronic ulcer of right calf with fat layer exposed ([facility] )   • Cellulitis of right ankle   • Fracture, toe     Past Medical History:   Diagnosis Date   • Anxiety    • Anxiety and depression    • Cataract    • Congestive cardiac failure ([place] [address] )    • Coronary artery disease    • Depression    • Diabetes mellitus ([place] [address] )    • Diabetic autonomic neuropathy associated with type 2 diabetes mellitus ([place] [address] )     Last Assessed: 2017   • History of DVT (deep vein thrombosis)     left LE   • Hyperlipidemia    • Lymphedema of right lower extremity    • Obesity    • Orthostatic hypotension      Past Surgical History:   Procedure Laterality Date   • CORONARY ARTERY BYPASS GRAFT     • EYE SURGERY      cataracts bilateral   • LEG AMPUTATION THROUGH LOWER TIBIA AND FIBULA Left 8/3/2018    Procedure: AMPUTATION BELOW KNEE (BKA) L BKA;  Surgeon: Cristiane Salcedo MD;  Location: [facility];  Service: Vascular   • ID CORONARY ARTERY BYP W/VEIN & ARTERY GRAFT 4 VEIN N/A 3/28/2018    Procedure: CORONARY ARTERY BYPASS GRAFT (CABG) x3 VESSELS, LIMA TO LAD, SVG--> PDA, SVG--> OM2,  RIGHT LEG EVH/SVH TO , INTRA-OP AMANDEEP;  Surgeon: Dora Oneil MD;  Location: [facility];  Service: Cardiac Surgery   • ROTATOR CUFF REPAIR Bilateral      Social History     Socioeconomic History   • Marital status: /Civil Union     Spouse name: None   • Number of children: None   • Years of education: None   • Highest education level: None   Occupational History   • None   Tobacco Use   • Smoking status: Former     Packs/day: 1 00     Years: 12 00     Total pack years: 12 00     Types: Cigarettes     Quit date: 3/23/1994     Years since quittin 2   • Smokeless tobacco: Never Vaping Use   • Vaping Use: Never used   Substance and Sexual Activity   • Alcohol use: No     Comment: rarely   • Drug use: No   • Sexual activity: Not Currently   Other Topics Concern   • None   Social History Narrative   • None     Social Determinants of Health     Financial Resource Strain: Not on file   Food Insecurity: No Food Insecurity (4/19/2023)    Hunger Vital Sign    • Worried About Running Out of Food in the Last Year: Never true    • Ran Out of Food in the Last Year: Never true   Transportation Needs: No Transportation Needs (4/19/2023)    PRAPARE - Transportation    • Lack of Transportation (Medical): No    • Lack of Transportation (Non-Medical): No   Physical Activity: Not on file   Stress: Not on file   Social Connections: Moderately Isolated (10/29/2020)    Social Connection and Isolation Panel [NHANES]    • Frequency of Communication with Friends and Family: Three times a week    • Frequency of Social Gatherings with Friends and Family: Three times a week    • Attends Sabianism Services: Never    • Active Member of Clubs or Organizations: No    • Attends Club or Organization Meetings: Never    • Marital Status:    Intimate Partner Violence: Not At Risk (10/29/2020)    Humiliation, Afraid, Rape, and Kick questionnaire    • Fear of Current or Ex-Partner: No    • Emotionally Abused: No    • Physically Abused: No    • Sexually Abused: No   Housing Stability: Low Risk  (4/19/2023)    Housing Stability Vital Sign    • Unable to Pay for Housing in the Last Year: No    • Number of [place] in the Last Year: 1    • Unstable Housing in the Last Year: No        Current Outpatient Medications:   •  ammonium lactate (LAC-HYDRIN) 12 % lotion, Apply 1 application   topically in the morning, Disp: , Rfl:   •  aspirin (ECOTRIN LOW STRENGTH) 81 mg EC tablet, Take 81 mg by mouth daily (Patient not taking: Reported on 5/23/2023), Disp: , Rfl:   •  atorvastatin (LIPITOR) 80 mg tablet, Take 1 tablet (80 mg total) by mouth daily, Disp: 90 tablet, Rfl: 1  •  cholecalciferol (VITAMIN D3) 1,000 units tablet, Take 1,000 Units by mouth daily, Disp: , Rfl:   •  clopidogrel (PLAVIX) 75 mg tablet, TAKE 1 TABLET BY MOUTH DAILY, Disp: 90 tablet, Rfl: 0  •  finasteride (PROSCAR) 5 mg tablet, Take 1 tablet (5 mg total) by mouth daily, Disp: 90 tablet, Rfl: 1  •  furosemide (LASIX) 40 mg tablet, TAKE 1 TABLET BY MOUTH TWICE  DAILY, Disp: 200 tablet, Rfl: 2  •  glucose blood test strip, ReliOn Prime Meter, Disp: , Rfl:   •  Insulin Glargine Solostar (Lantus SoloStar) 100 UNIT/ML SOPN, Inject 0 4 mL (40 Units total) under the skin 2 (two) times a day (Patient taking differently: Inject 60 Units under the skin 2 (two) times a day), Disp: 135 mL, Rfl: 0  •  metoclopramide (REGLAN) 5 mg tablet, Take 1 tablet (5 mg total) by mouth daily, Disp: 90 tablet, Rfl: 1  •  midodrine (PROAMATINE) 2 5 mg tablet, Take 1 tablet (2 5 mg total) by mouth 3 (three) times a day before meals (Patient not taking: Reported on 5/23/2023), Disp: , Rfl: 0  •  pantoprazole (PROTONIX) 40 mg tablet, TAKE 1 TABLET BY MOUTH DAILY IN  THE EARLY MORNING, Disp: 90 tablet, Rfl: 0  •  tamsulosin (FLOMAX) 0 4 mg, Take 1 capsule (0 4 mg total) by mouth daily with dinner, Disp: 90 capsule, Rfl: 1  Family History   Problem Relation Age of Onset   • Atrial fibrillation Mother    • Stroke Mother    • Hypertension Father    • Heart attack Paternal Grandfather 61   • Diabetes Maternal Grandmother    • Diabetes Maternal Grandfather    • Diabetes Maternal Aunt    • Diabetes Maternal Uncle       Review of Systems  Allergies:  Metformin      Objective:  /72   Pulse 60   Temp (!) 97 °F (36 1 °C)   Resp 16     Physical Exam  Vitals reviewed  Constitutional:       Appearance: He is obese  He is not ill-appearing or diaphoretic  Cardiovascular:      Pulses: Normal pulses  Pulmonary:      Effort: No respiratory distress     Musculoskeletal:      Right lower leg: Edema present  Left lower leg: Edema present  Skin:     General: Skin is dry  Capillary Refill: Capillary refill takes less than 2 seconds  Findings: Rash present  Neurological:      Mental Status: He is alert  Wound 05/23/23 Diabetic Ulcer Ankle Right;Medial (Active)   Wound Image   06/20/23 1020   Wound Description Pink;Epithelialization 06/20/23 1020   Anya-wound Assessment Intact;Dry;Scaly 06/20/23 1020   Wound Length (cm) 0 2 cm 06/20/23 1020   Wound Width (cm) 0 5 cm 06/20/23 1020   Wound Depth (cm) 0 1 cm 06/20/23 1020   Wound Surface Area (cm^2) 0 1 cm^2 06/20/23 1020   Wound Volume (cm^3) 0 01 cm^3 06/20/23 1020   Calculated Wound Volume (cm^3) 0 01 cm^3 06/20/23 1020   Change in Wound Size % 97 78 06/20/23 1020   Drainage Amount Scant 06/20/23 1020   Drainage Description Serosanguineous 06/20/23 1020   Non-staged Wound Description Full thickness 06/06/23 0935   Treatments Cleansed 06/20/23 1020   Patient Tolerance Tolerated well 05/23/23 0836   Dressing Status Removed 05/23/23 0836       Wound 05/30/23 Traumatic Toe (Comment  which one) Anterior;Right (Active)   Wound Image   06/20/23 1021   Wound Description Epithelialization 06/20/23 1023   Anya-wound Assessment Dry; Intact 06/20/23 1023   Wound Length (cm) 0 cm 06/20/23 1023   Wound Width (cm) 0 cm 06/20/23 1023   Wound Depth (cm) 0 cm 06/20/23 1023   Wound Surface Area (cm^2) 0 cm^2 06/20/23 1023   Wound Volume (cm^3) 0 cm^3 06/20/23 1023   Calculated Wound Volume (cm^3) 0 cm^3 06/20/23 1023   Change in Wound Size % 100 06/20/23 1023   Drainage Amount None 06/20/23 1023   Drainage Description Serous 06/13/23 1032   Treatments Cleansed 06/20/23 1023                         Procedures     Results from last 6 Months   Lab Units 04/17/23  1779   WOUND CULTURE  2+ Growth of - Pantoea septica Pantoea species*           Wound Instructions:  Orders Placed This Encounter   Procedures   • Wound cleansing and dressings         Shower no  "Do not get dressing wet       Right Great toe and 5th toe wounds are healed        Right Ankle:  Cleanse as above  Apply Acticoat 7 followed by calcium alginate to the wound  Cover with gauze & abd  Secure with antonia and tape  Apply one roll of cast padding to build up ankle  Apply Oralee Blase and coban  Change dressing weekly at the wound center         If you develop severe pain in your right lower leg, or your toes tingle or turn blue/purple, unwrap compression wrap on leg and call the wound center  If you're unable to reach the wound center, go to the nearest ER      Surgical shoe to right foot at all times except when sleeping     Continue 3-4 servings of protein (30g) in your diet each day      Follow up in [address] [place] [address] in 1 week     Today's Treatment:  Wounds were cleansed with soap and water & redressed as ordered above  Standing Status:   Future     Standing Expiration Date:   6/20/2024         Shailesh Carpenter DPM      Portions of the record may have been created with voice recognition software  Occasional wrong word or \"sound a like\" substitutions may have occurred due to the inherent limitations of voice recognition software  Read the chart carefully and recognize, using context, where substitutions have occurred        "

## 2023-06-20 NOTE — PATIENT INSTRUCTIONS
Orders Placed This Encounter   Procedures    Wound cleansing and dressings         Shower no  Do not get dressing wet  Right Great toe and 5th toe wounds are healed  Right Ankle:  Cleanse as above  Apply Acticoat 7 followed by calcium alginate to the wound  Cover with gauze & abd  Secure with antonia and tape  Apply one roll of cast padding to build up ankle  Apply Howie and coban  Change dressing weekly at the wound center  If you develop severe pain in your right lower leg, or your toes tingle or turn blue/purple, unwrap compression wrap on leg and call the wound center  If you're unable to reach the wound center, go to the nearest ER  Surgical shoe to right foot at all times except when sleeping     Continue 3-4 servings of protein (30g) in your diet each day  Follow up in [address] [place] [address] in 1 week     Today's Treatment:  Wounds were cleansed with soap and water & redressed as ordered above       Standing Status:   Future     Standing Expiration Date:   6/20/2024 Cheek Interpolation Flap Text: A decision was made to reconstruct the defect utilizing an interpolation axial flap and a staged reconstruction.  A telfa template was made of the defect.  This telfa template was then used to outline the Cheek Interpolation flap.  The donor area for the pedicle flap was then injected with anesthesia.  The flap was excised through the skin and subcutaneous tissue down to the layer of the underlying musculature.  The interpolation flap was carefully excised within this deep plane to maintain its blood supply.  The edges of the donor site were undermined.   The donor site was closed in a primary fashion.  The pedicle was then rotated into position and sutured.  Once the tube was sutured into place, adequate blood supply was confirmed with blanching and refill.  The pedicle was then wrapped with xeroform gauze and dressed appropriately with a telfa and gauze bandage to ensure continued blood supply and protect the attached pedicle.

## 2023-06-20 NOTE — PROGRESS NOTES
Cast Application    Date/Time: 6/20/2023 10:00 AM    Performed by: Hilary Fairchild RN  Authorized by: Amrita Aguilar DPM  Universal Protocol:  Consent: Verbal consent obtained  Consent given by: patient  Patient identity confirmed: verbally with patient      Pre-procedure details:     Sensation:  Unchanged  Procedure details:     Laterality:  Right    Location:  Leg    Leg:  R lower legCast type:  Unna boot      Supplies:  2 layer wrap (acticoat 7, alginate, dry gauze, antonia, cast padding, unna, coban, tubifast yellow)  Post-procedure details:     Pain:  Improved    Sensation:  Unchanged    Patient tolerance of procedure: Tolerated well, no immediate complications  Comments:      UNNA BOOT wrap procedure     Before application, YUDITH and/or TBI determined to be adequate for healing and application of compression  Lower extremity washed prior to application of compression wrap  With the foot in dorsiflexed position, unna boot was applied as per physician orders without complications or complaint of pain  The procedure was tolerated well  Toes warm & pink post application  Patient provided education & reinforced to observe toes for any discoloration, swelling or tingling and instructed when to report to the [address] or to remove compression  Wound care as per providers orders, patient tolerated well

## 2023-06-25 DIAGNOSIS — I50.22 CHRONIC SYSTOLIC CONGESTIVE HEART FAILURE (HCC): ICD-10-CM

## 2023-06-27 ENCOUNTER — OFFICE VISIT (OUTPATIENT)
Dept: WOUND CARE | Facility: CLINIC | Age: 63
End: 2023-06-27
Payer: COMMERCIAL

## 2023-06-27 VITALS
TEMPERATURE: 95.5 F | SYSTOLIC BLOOD PRESSURE: 144 MMHG | RESPIRATION RATE: 16 BRPM | HEART RATE: 64 BPM | DIASTOLIC BLOOD PRESSURE: 76 MMHG

## 2023-06-27 DIAGNOSIS — L85.3 XEROSIS CUTIS: ICD-10-CM

## 2023-06-27 DIAGNOSIS — E11.42 DIABETIC POLYNEUROPATHY ASSOCIATED WITH TYPE 2 DIABETES MELLITUS (HCC): ICD-10-CM

## 2023-06-27 DIAGNOSIS — I89.0 LYMPHEDEMA IN ADULT PATIENT: Primary | ICD-10-CM

## 2023-06-27 PROCEDURE — 99212 OFFICE O/P EST SF 10 MIN: CPT | Performed by: PODIATRIST

## 2023-06-27 RX ORDER — AMMONIUM LACTATE 12 G/100G
1 LOTION TOPICAL DAILY
Qty: 222 ML | Refills: 0 | Status: SHIPPED | OUTPATIENT
Start: 2023-06-27

## 2023-06-27 RX ORDER — ATORVASTATIN CALCIUM 80 MG/1
TABLET, FILM COATED ORAL
Qty: 30 TABLET | Refills: 0 | OUTPATIENT
Start: 2023-06-27

## 2023-06-27 NOTE — PROGRESS NOTES
Patient ID: Glenna Ramírez is a 58 y o  male Date of Birth 1960     Diagnosis:  1  Lymphedema in adult patient  -     Compression Stocking  -     ammonium lactate (LAC-HYDRIN) 12 % lotion; Apply 1 Application topically in the morning  -     Wound cleansing and dressings; Future  -     Ambulatory Referral to Podiatry; Future; Expected date: 08/16/2023    2  Xerosis cutis  -     ammonium lactate (LAC-HYDRIN) 12 % lotion; Apply 1 Application topically in the morning  -     Ambulatory Referral to Podiatry; Future; Expected date: 08/16/2023    3  Diabetic polyneuropathy associated with type 2 diabetes mellitus ([facility] )  -     Ambulatory Referral to Podiatry; Future; Expected date: 08/16/2023       Diagnosis ICD-10-CM Associated Orders   1  Lymphedema in adult patient  I89 0 Compression Stocking     ammonium lactate (LAC-HYDRIN) 12 % lotion     Wound cleansing and dressings     Ambulatory Referral to Podiatry      2  Xerosis cutis  L85 3 ammonium lactate (LAC-HYDRIN) 12 % lotion     Ambulatory Referral to Podiatry      3  Diabetic polyneuropathy associated with type 2 diabetes mellitus ([facility] )  E11 42 Ambulatory Referral to Podiatry           Assessment & Plan:  1  Wounds are healed  2  Rx compression stocking, must wear every day, take time ot elevate  3  Amlactin for the dry skin  4   Begin at risk foot care 6-8 weeks in outpatient office  5, DC from wound care    Chief Complaint   Patient presents with   • Follow Up Wound Care Visit     Right leg wound            Subjective:   Patients ankle ulcer is healed      The following portions of the patient's history were reviewed and updated as appropriate:   Patient Active Problem List   Diagnosis   • Anxiety and depression   • Type 2 diabetes mellitus with diabetic neuropathy, with long-term current use of insulin ([facility] )   • Hyperlipidemia   • 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   • Vitamin D deficiency   • PAD (peripheral artery disease) ([facility] )   • S/P BKA (below knee amputation), left (HCC)   • Orthostatic hypotension   • Diabetic gastroparesis (HCC)   • Class 2 severe obesity due to excess calories with serious comorbidity and body mass index (BMI) of 35 0 to 35 9 in adult [facility])   • Triple vessel coronary artery disease   • Hypertensive heart disease with congestive heart failure (HCC)   • Chronic diastolic heart failure (HCC)   • Other constipation   • S/P CABG x 3   • Diabetic autonomic neuropathy associated with type 2 diabetes mellitus (HCC)   • Hyponatremia   • Obstructive sleep apnea   • Amputated below knee, left (HCC)   • Phantom limb syndrome without pain ([facility])   • CAD (coronary artery disease)   • Nodular rash   • Elephantiasis nostras verrucosa   • Other acute osteomyelitis, left ankle and foot ([facility])   • Embolism and thrombosis of arteries of the lower extremities ([facility])   • Lymphedema of right lower extremity   • Venous stasis dermatitis of right lower extremity   • Abdominal distension   • Non-pressure chronic ulcer of right calf with fat layer exposed ([facility] )   • Cellulitis of right ankle   • Fracture, toe   • Lymphedema in adult patient     Past Medical History:   Diagnosis Date   • Anxiety    • Anxiety and depression    • Cataract    • Congestive cardiac failure ([facility] )    • Coronary artery disease    • Depression    • Diabetes mellitus ([facility] )    • Diabetic autonomic neuropathy associated with type 2 diabetes mellitus ([facility] )     Last Assessed: 12/28/2017   • History of DVT (deep vein thrombosis)     left LE   • Hyperlipidemia    • Lymphedema of right lower extremity    • Obesity    • Orthostatic hypotension      Past Surgical History:   Procedure Laterality Date   • CORONARY ARTERY BYPASS GRAFT     • EYE SURGERY      cataracts bilateral   • LEG AMPUTATION THROUGH LOWER TIBIA AND FIBULA Left 8/3/2018    Procedure: AMPUTATION BELOW KNEE (BKA) L BKA;  Surgeon: Indiana Peña MD;  Location: [facility];  Service: Vascular   • MN CORONARY ARTERY BYP W/VEIN & ARTERY GRAFT 4 VEIN N/A 3/28/2018    Procedure: CORONARY ARTERY BYPASS GRAFT (CABG) x3 VESSELS, LIMA TO LAD, SVG--> PDA, SVG--> OM2,  RIGHT LEG EVH/SVH TO , INTRA-OP AMANDEEP;  Surgeon: Alena Greene MD;  Location: [facility];  Service: Cardiac Surgery   • ROTATOR CUFF REPAIR Bilateral      Social History     Socioeconomic History   • Marital status: /Civil Union     Spouse name: Not on file   • Number of children: Not on file   • Years of education: Not on file   • Highest education level: Not on file   Occupational History   • Not on file   Tobacco Use   • Smoking status: Former     Packs/day: 1 00     Years: 12 00     Total pack years: 12 00     Types: Cigarettes     Quit date: 3/23/1994     Years since quittin 2   • Smokeless tobacco: Never   Vaping Use   • Vaping Use: Never used   Substance and Sexual Activity   • Alcohol use: No     Comment: rarely   • Drug use: No   • Sexual activity: Not Currently   Other Topics Concern   • Not on file   Social History Narrative   • Not on file     Social Determinants of Health     Financial Resource Strain: Not on file   Food Insecurity: No Food Insecurity (2023)    Hunger Vital Sign    • Worried About Running Out of Food in the Last Year: Never true    • Ran Out of Food in the Last Year: Never true   Transportation Needs: No Transportation Needs (2023)    PRAPARE - Transportation    • Lack of Transportation (Medical): No    • Lack of Transportation (Non-Medical): No   Physical Activity: Not on file   Stress: Not on file   Social Connections: Moderately Isolated (10/29/2020)    Social Connection and Isolation Panel [NHANES]    • Frequency of Communication with Friends and Family: Three times a week    • Frequency of Social Gatherings with Friends and Family:  Three times a week    • Attends [employer] Services: Never    • Active Member of Clubs or Organizations: No    • Attends Club or Organization Meetings: Never    • Marital Status:    Intimate Partner Violence: Not At Risk (10/29/2020)    Humiliation, Afraid, Rape, and Kick questionnaire    • Fear of Current or Ex-Partner: No    • Emotionally Abused: No    • Physically Abused: No    • Sexually Abused: No   Housing Stability: Low Risk  (4/19/2023)    Housing Stability Vital Sign    • Unable to Pay for Housing in the Last Year: No    • Number of Places Lived in the Last Year: 1    • Unstable Housing in the Last Year: No        Current Outpatient Medications:   •  ammonium lactate (LAC-HYDRIN) 12 % lotion, Apply 1 Application topically in the morning, Disp: 222 mL, Rfl: 0  •  aspirin (ECOTRIN LOW STRENGTH) 81 mg EC tablet, Take 81 mg by mouth daily (Patient not taking: Reported on 5/23/2023), Disp: , Rfl:   •  atorvastatin (LIPITOR) 80 mg tablet, Take 1 tablet (80 mg total) by mouth daily, Disp: 90 tablet, Rfl: 1  •  cholecalciferol (VITAMIN D3) 1,000 units tablet, Take 1,000 Units by mouth daily, Disp: , Rfl:   •  clopidogrel (PLAVIX) 75 mg tablet, TAKE 1 TABLET BY MOUTH DAILY, Disp: 90 tablet, Rfl: 0  •  finasteride (PROSCAR) 5 mg tablet, Take 1 tablet (5 mg total) by mouth daily, Disp: 90 tablet, Rfl: 1  •  furosemide (LASIX) 40 mg tablet, TAKE 1 TABLET BY MOUTH TWICE  DAILY, Disp: 200 tablet, Rfl: 2  •  glucose blood test strip, ReliOn Prime Meter, Disp: , Rfl:   •  Insulin Glargine Solostar (Lantus SoloStar) 100 UNIT/ML SOPN, Inject 0 4 mL (40 Units total) under the skin 2 (two) times a day (Patient taking differently: Inject 60 Units under the skin 2 (two) times a day), Disp: 135 mL, Rfl: 0  •  metoclopramide (REGLAN) 5 mg tablet, Take 1 tablet (5 mg total) by mouth daily, Disp: 90 tablet, Rfl: 1  •  midodrine (PROAMATINE) 2 5 mg tablet, Take 1 tablet (2 5 mg total) by mouth 3 (three) times a day before meals (Patient not taking: Reported on 5/23/2023), Disp: , Rfl: 0  •  pantoprazole (PROTONIX) 40 mg tablet, TAKE 1 TABLET BY MOUTH DAILY IN  THE EARLY MORNING, Disp: 90 tablet, Rfl: 0  •  tamsulosin (FLOMAX) 0 4 mg, Take 1 capsule (0 4 mg total) by mouth daily with dinner, Disp: 90 capsule, Rfl: 1  Family History   Problem Relation Age of Onset   • Atrial fibrillation Mother    • Stroke Mother    • Hypertension Father    • Heart attack Paternal Grandfather 61   • Diabetes Maternal Grandmother    • Diabetes Maternal Grandfather    • Diabetes Maternal Aunt    • Diabetes Maternal Uncle       Review of Systems   Constitutional: Negative  Allergies:  Metformin      Objective:  /76   Pulse 64   Temp (!) 95 5 °F (35 3 °C)   Resp 16     Physical Exam  Vitals reviewed  Constitutional:       Appearance: He is obese  He is not ill-appearing or diaphoretic  Cardiovascular:      Pulses: no weak pulses          Dorsalis pedis pulses are detected w/ Doppler on the right side  Musculoskeletal:         General: Swelling (severe RLE lymphedea) and deformity (LEft BKA) present  Right foot: Decreased range of motion  Left Lower Extremity: Left leg is amputated below knee  Feet:      Right foot:      Protective Sensation: 0 sites sensed  Skin integrity: Dry skin present  No ulcer  Toenail Condition: Right toenails are abnormally thick  Fungal disease present  Left foot: amputated      Diabetic Foot Exam    Patient's shoes and socks removed  Right Foot/Ankle   Right Foot Inspection  Skin Exam: skin intact, dry skin and abnormal color  No ulcer  Toe Exam: swelling and right toe deformity  Sensory   Vibration: absent  Proprioception: absent  Monofilament testing: absent    Vascular  Capillary refills: < 3 seconds  The right DP pulse is detected w/ Doppler       Left Foot/Ankle  Left Foot Inspection  Amputation: amputation left foot (Comments: BKA with prosthetic)    Assign Risk Category  Deformity present  Loss of protective sensation  No weak pulses  Risk: 3        Wound 05/23/23 Diabetic Ulcer Ankle Right;Medial (Active)   Wound Image   06/27/23 4781   Wound Description Epithelialization 06/27/23 0927   Anya-wound Assessment Intact;Dry;Scaly 06/20/23 1020   Wound Length (cm) 0 cm 06/27/23 0927   Wound Width (cm) 0 cm 06/27/23 7015   Wound Depth (cm) 0 cm 06/27/23 0927   Wound Surface Area (cm^2) 0 cm^2 06/27/23 0927   Wound Volume (cm^3) 0 cm^3 06/27/23 0927   Calculated Wound Volume (cm^3) 0 cm^3 06/27/23 0927   Change in Wound Size % 100 06/27/23 0927   Drainage Amount None 06/27/23 0927   Drainage Description Serosanguineous 06/20/23 1020   Non-staged Wound Description Full thickness 06/06/23 0935   Treatments Cleansed 06/20/23 1020   Patient Tolerance Tolerated well 05/23/23 0836   Dressing Status Intact 06/27/23 0927                         Procedures     Results from last 6 Months   Lab Units 04/17/23  3616   WOUND CULTURE  2+ Growth of - Pantoea septica Pantoea species*           Wound Instructions:  Orders Placed This Encounter   Procedures   • Wound cleansing and dressings     Your wound is healed  Continue good daily skin care  Moisturize daily (script sent for ammonium lactate)  Wear compression stockings daily  Elevate your leg at all times when sitting  As discussed at wound care center today recommend the use of green foam wedge for elevation   Call wound care center if your wound re-opens or you notice any drainage  Thank you for choosing [address] [place] [address] wound care center       Standing Status:   Future     Standing Expiration Date:   6/27/2024   • Compression Stocking     Order Specific Question:   Type     Answer:   Knee     Order Specific Question:   Length     Answer:   Below Knee     Order Specific Question:   mm HG     Answer:   30-40   • Ambulatory Referral to Podiatry     Standing Status:   Future     Standing Expiration Date:   6/27/2024     Referral Priority:   Routine     Referral Type:   Consult - AMB     Referral Reason:   Specialty Services Required     Requested Specialty:   Podiatry     Number of Visits Requested:   1 "    Expiration Date:   6/27/2024         Kellee Nascimento DPM      Portions of the record may have been created with voice recognition software  Occasional wrong word or \"sound a like\" substitutions may have occurred due to the inherent limitations of voice recognition software  Read the chart carefully and recognize, using context, where substitutions have occurred        "

## 2023-06-27 NOTE — PATIENT INSTRUCTIONS
Orders Placed This Encounter   Procedures    Wound cleansing and dressings     Your wound is healed  Continue good daily skin care  Moisturize daily (script sent for ammonium lactate)  Wear compression stockings daily  Elevate your leg at all times when sitting  As discussed at wound care center today recommend the use of green foam wedge for elevation   Call wound care center if your wound re-opens or you notice any drainage  Thank you for choosing [address] [place] [address] wound care center       Standing Status:   Future     Standing Expiration Date:   6/27/2024    Compression Stocking     Order Specific Question:   Type     Answer:   Knee     Order Specific Question:   Length     Answer:   Below Knee     Order Specific Question:   mm HG     Answer:   30-40

## 2023-07-11 DIAGNOSIS — I50.22 CHRONIC SYSTOLIC CONGESTIVE HEART FAILURE (HCC): ICD-10-CM

## 2023-07-12 RX ORDER — ATORVASTATIN CALCIUM 80 MG/1
TABLET, FILM COATED ORAL
Qty: 30 TABLET | Refills: 0 | OUTPATIENT
Start: 2023-07-12

## 2023-07-19 ENCOUNTER — OFFICE VISIT (OUTPATIENT)
Dept: PODIATRY | Facility: CLINIC | Age: 63
End: 2023-07-19
Payer: COMMERCIAL

## 2023-07-19 VITALS
DIASTOLIC BLOOD PRESSURE: 78 MMHG | BODY MASS INDEX: 36.58 KG/M2 | SYSTOLIC BLOOD PRESSURE: 145 MMHG | WEIGHT: 276 LBS | HEIGHT: 73 IN | HEART RATE: 95 BPM

## 2023-07-19 DIAGNOSIS — E11.621 DIABETIC ULCER OF RIGHT HEEL ASSOCIATED WITH TYPE 2 DIABETES MELLITUS, WITH FAT LAYER EXPOSED (HCC): Primary | ICD-10-CM

## 2023-07-19 DIAGNOSIS — Z89.512 S/P BKA (BELOW KNEE AMPUTATION), LEFT (HCC): ICD-10-CM

## 2023-07-19 DIAGNOSIS — L97.412 DIABETIC ULCER OF RIGHT HEEL ASSOCIATED WITH TYPE 2 DIABETES MELLITUS, WITH FAT LAYER EXPOSED (HCC): Primary | ICD-10-CM

## 2023-07-19 DIAGNOSIS — I89.0 LYMPHEDEMA OF RIGHT LOWER EXTREMITY: ICD-10-CM

## 2023-07-19 PROCEDURE — 99214 OFFICE O/P EST MOD 30 MIN: CPT | Performed by: PODIATRIST

## 2023-07-19 RX ORDER — CLINDAMYCIN HYDROCHLORIDE 150 MG/1
300 CAPSULE ORAL EVERY 6 HOURS SCHEDULED
Qty: 56 CAPSULE | Refills: 0 | Status: SHIPPED | OUTPATIENT
Start: 2023-07-19 | End: 2023-07-26

## 2023-07-19 NOTE — PROGRESS NOTES
Assessment/Plan:       Diagnoses and all orders for this visit:    Diabetic ulcer of right heel associated with type 2 diabetes mellitus, with fat layer exposed ([address])  -     Ambulatory Referral to Wound Care; Future  -     Referral to [address]; Future  -     clindamycin (CLEOCIN) 150 mg capsule; Take 2 capsules (300 mg total) by mouth every 6 (six) hours for 7 days    Lymphedema of right lower extremity    S/P BKA (below knee amputation), left (HCC)      Diagnosis and options discussed with patient  Patient agreeable to the plan as stated below    PAtient has DM ulcer right heel. Charon Brunner 2. High risk for infection/amputation. He has little ability to care for himself but also is reluctant to leave his house. I ordered home nursing for the wound. Alginate and DSD. He must keep the heel off the floor when sitting. Recommend oral antibiotic. Wound was cleaned and dressed. Refer to wound management center. If wound were to worsen, he should go to the emergency room. Subjective:      Patient ID: Rickey Limon is a 58 y.o. male. PAtient presents for at risk DM foot care. He was discharged last month from the wound center after a wound on his leg resolved. He has not been wearing any compression on the leg and spends most of the day in a dependent position with his heel on the fllor. He has a left BKA. HE noticed some drainage on his sock but cannot see his feet to know if there is a wound. He has severe lymphedema, DM neuropathy, and BKA on the left      The following portions of the patient's history were reviewed and updated as appropriate: allergies, current medications, past family history, past medical history, past social history, past surgical history and problem list.    Review of Systems    Constitutional: Negative. Respiratory: Negative for cough and shortness of breath. Gastrointestinal: Negative for diarrhea, nausea and vomiting.    Musculoskeletal: ambulatory dysfunction  Skin: wound  Neurological: Neuropathy        Objective:      /78   Pulse 95   Ht 6' 1" (1.854 m)   Wt 125 kg (276 lb)   BMI 36.41 kg/m²          Physical Exam  Vitals reviewed. Constitutional:       Appearance: He is obese. He is not ill-appearing or diaphoretic. Cardiovascular:      Rate and Rhythm: Normal rate. Pulmonary:      Effort: Pulmonary effort is normal. No respiratory distress. Musculoskeletal:      Right lower leg: Edema (severe lymphedema) present. Feet:    Skin:     Capillary Refill: Capillary refill takes 2 to 3 seconds. Neurological:      Mental Status: He is alert. He is disoriented. Sensory: Sensory deficit present.       Gait: Gait abnormal.

## 2023-07-20 ENCOUNTER — HOME HEALTH ADMISSION (OUTPATIENT)
Dept: HOME HEALTH SERVICES | Facility: HOME HEALTHCARE | Age: 63
End: 2023-07-20
Payer: COMMERCIAL

## 2023-07-20 ENCOUNTER — TELEPHONE (OUTPATIENT)
Dept: PODIATRY | Facility: CLINIC | Age: 63
End: 2023-07-20

## 2023-07-20 NOTE — TELEPHONE ENCOUNTER
Caller: Gilbert Monday    Doctor/Office: Dr. Olegario Singh    #: [phone]    Escalation: Care Calling on behalf of Aida Hernandez. Would like a return call from Dr. Anna Etienne as she has some questions about his wound and would like to know more about his office visit. Thank you.

## 2023-07-20 NOTE — TELEPHONE ENCOUNTER
Caller: Audrea Canavan    Doctor/Office: Tonya Amado DPM    #: [phone]    Escalation: I had to go into Ami Desmonds chart to see if the orders were there for the visiting nurses.

## 2023-07-21 ENCOUNTER — HOME CARE VISIT (OUTPATIENT)
Dept: HOME HEALTH SERVICES | Facility: HOME HEALTHCARE | Age: 63
End: 2023-07-21
Payer: COMMERCIAL

## 2023-07-21 VITALS
TEMPERATURE: 97.6 F | RESPIRATION RATE: 20 BRPM | DIASTOLIC BLOOD PRESSURE: 72 MMHG | HEART RATE: 76 BPM | BODY MASS INDEX: 36.52 KG/M2 | HEIGHT: 73 IN | SYSTOLIC BLOOD PRESSURE: 134 MMHG | WEIGHT: 275.57 LBS | OXYGEN SATURATION: 95 %

## 2023-07-21 PROCEDURE — 400013 VN SOC

## 2023-07-21 PROCEDURE — G0299 HHS/HOSPICE OF RN EA 15 MIN: HCPCS

## 2023-07-24 ENCOUNTER — HOME CARE VISIT (OUTPATIENT)
Dept: HOME HEALTH SERVICES | Facility: HOME HEALTHCARE | Age: 63
End: 2023-07-24
Payer: COMMERCIAL

## 2023-07-24 VITALS
HEART RATE: 78 BPM | RESPIRATION RATE: 18 BRPM | SYSTOLIC BLOOD PRESSURE: 126 MMHG | OXYGEN SATURATION: 95 % | DIASTOLIC BLOOD PRESSURE: 72 MMHG | TEMPERATURE: 96.9 F

## 2023-07-24 PROCEDURE — G0299 HHS/HOSPICE OF RN EA 15 MIN: HCPCS

## 2023-07-25 ENCOUNTER — HOME CARE VISIT (OUTPATIENT)
Dept: HOME HEALTH SERVICES | Facility: HOME HEALTHCARE | Age: 63
End: 2023-07-25
Payer: COMMERCIAL

## 2023-07-26 ENCOUNTER — HOME CARE VISIT (OUTPATIENT)
Dept: HOME HEALTH SERVICES | Facility: HOME HEALTHCARE | Age: 63
End: 2023-07-26
Payer: COMMERCIAL

## 2023-07-26 VITALS
TEMPERATURE: 97.7 F | DIASTOLIC BLOOD PRESSURE: 68 MMHG | SYSTOLIC BLOOD PRESSURE: 148 MMHG | RESPIRATION RATE: 18 BRPM | OXYGEN SATURATION: 97 % | HEART RATE: 77 BPM

## 2023-07-26 DIAGNOSIS — I50.22 CHRONIC SYSTOLIC CONGESTIVE HEART FAILURE (HCC): ICD-10-CM

## 2023-07-26 PROCEDURE — G0299 HHS/HOSPICE OF RN EA 15 MIN: HCPCS

## 2023-07-27 NOTE — CASE COMMUNICATION
Ship to  Pt.  Home   Insurance [facility]    Wound 1 R heel    Saline 100ml [id number]  ABD 5x9 [id number]  Transpore white  2in [id number] 2

## 2023-07-28 ENCOUNTER — HOME CARE VISIT (OUTPATIENT)
Dept: HOME HEALTH SERVICES | Facility: HOME HEALTHCARE | Age: 63
End: 2023-07-28
Payer: COMMERCIAL

## 2023-07-28 ENCOUNTER — TELEPHONE (OUTPATIENT)
Dept: HOME HEALTH SERVICES | Facility: HOME HEALTHCARE | Age: 63
End: 2023-07-28

## 2023-07-28 VITALS
RESPIRATION RATE: 16 BRPM | SYSTOLIC BLOOD PRESSURE: 148 MMHG | HEART RATE: 82 BPM | OXYGEN SATURATION: 96 % | TEMPERATURE: 98.2 F | DIASTOLIC BLOOD PRESSURE: 72 MMHG

## 2023-07-28 PROCEDURE — G0299 HHS/HOSPICE OF RN EA 15 MIN: HCPCS

## 2023-07-28 RX ORDER — ATORVASTATIN CALCIUM 80 MG/1
80 TABLET, FILM COATED ORAL DAILY
Qty: 30 TABLET | Refills: 0 | OUTPATIENT
Start: 2023-07-28

## 2023-07-28 NOTE — CASE COMMUNICATION
wound photo uploaded in media tab  right heel wound black eschar and periwound erythema. patient is not scheduled to be seen by a provider till 08/15/23. he is willing to see any provider if there is availability next week. drainage from right foot great toe. afebrile. no c/o pain. numbness to right foot patient reports is not new.  unable to palpate pedal pulse due to lymphedema

## 2023-07-31 ENCOUNTER — HOME CARE VISIT (OUTPATIENT)
Dept: HOME HEALTH SERVICES | Facility: HOME HEALTHCARE | Age: 63
End: 2023-07-31
Payer: COMMERCIAL

## 2023-07-31 ENCOUNTER — OFFICE VISIT (OUTPATIENT)
Dept: WOUND CARE | Facility: CLINIC | Age: 63
End: 2023-07-31
Payer: COMMERCIAL

## 2023-07-31 VITALS
RESPIRATION RATE: 18 BRPM | HEART RATE: 64 BPM | DIASTOLIC BLOOD PRESSURE: 64 MMHG | WEIGHT: 260 LBS | BODY MASS INDEX: 34.46 KG/M2 | TEMPERATURE: 96.5 F | SYSTOLIC BLOOD PRESSURE: 132 MMHG | HEIGHT: 73 IN

## 2023-07-31 DIAGNOSIS — Z89.512 STATUS POST BELOW KNEE AMPUTATION, LEFT (HCC): ICD-10-CM

## 2023-07-31 DIAGNOSIS — L97.412 ULCER OF RIGHT HEEL, WITH FAT LAYER EXPOSED (HCC): Primary | ICD-10-CM

## 2023-07-31 DIAGNOSIS — I89.0 LYMPHEDEMA: ICD-10-CM

## 2023-07-31 DIAGNOSIS — E11.42 DIABETIC POLYNEUROPATHY ASSOCIATED WITH TYPE 2 DIABETES MELLITUS (HCC): ICD-10-CM

## 2023-07-31 PROCEDURE — 99214 OFFICE O/P EST MOD 30 MIN: CPT | Performed by: PODIATRIST

## 2023-07-31 PROCEDURE — 11042 DBRDMT SUBQ TIS 1ST 20SQCM/<: CPT | Performed by: PODIATRIST

## 2023-07-31 PROCEDURE — 99213 OFFICE O/P EST LOW 20 MIN: CPT | Performed by: PODIATRIST

## 2023-07-31 RX ORDER — SODIUM HYPOCHLORITE 1.25 MG/ML
1 SOLUTION TOPICAL DAILY
Qty: 473 ML | Refills: 1 | Status: SHIPPED | OUTPATIENT
Start: 2023-07-31

## 2023-07-31 NOTE — PROGRESS NOTES
Patient ID: Ismael Astudillo is a 58 y.o. male Date of Birth 1960     Chief Complaint  Chief Complaint   Patient presents with   • New Patient Visit     RIGHT HEEL WOUND        Allergies  Metformin    Assessment:    No problem-specific Assessment & Plan notes found for this encounter. Diagnoses and all orders for this visit:    Ulcer of right heel, with fat layer exposed ([address])  -     Cancel: Wound cleansing and dressings; Future  -     Debridement  -     sodium hypochlorite (DAKIN'S QUARTER-STRENGTH); Irrigate with 1 Application as directed daily  -     Wound cleansing and dressings; Future    Diabetic polyneuropathy associated with type 2 diabetes mellitus ([address])  -     Debridement    Lymphedema    Status post below knee amputation, left ([address])              Debridement   Wound 07/21/23 Diabetic Ulcer Heel Right    Universal Protocol:  Consent: Verbal consent obtained. Risks and benefits: risks, benefits and alternatives were discussed  Consent given by: patient  Time out: Immediately prior to procedure a "time out" was called to verify the correct patient, procedure, equipment, support staff and site/side marked as required.   Timeout called at: 7/31/2023 11:42 AM.  Patient understanding: patient states understanding of the procedure being performed  Patient identity confirmed: verbally with patient      Performed by: physician  Debridement type: surgical  Level of debridement: subcutaneous tissue  Pain control: lidocaine 4%  Post-debridement measurements  Length (cm): 3.9  Width (cm): 3.7  Depth (cm): 0.1  Percent debrided: 100%  Surface Area (cm^2): 14.43  Area debrided (cm^2): 14.43  Volume (cm^3): 1.44  Tissue and other material debrided: dermis, epidermis and subcutaneous tissue  Devitalized tissue debrided: biofilm, fibrin, necrotic debris, slough and eschar  Instrument(s) utilized: blade  Bleeding: small  Hemostasis obtained with: pressure  Procedural pain (0-10): 0  Post-procedural pain: 0   Response to treatment: procedure was tolerated well          Plan:  1. Reviewed the note from podiatry and photo. Reviewed blood work and HbA1c was 6.7. Patient was counseled and educated on the condition and the diagnosis. 2. Wound debrided. Start Dakin to reduce bioburden. 3. He has high risk for limb loss. Stressed on pt compliance about staying off, elevation, and compression, and diet to manage LE edema and off-loading. 4. Recommended NWB to right foot. Toe touch for transfer. 5. Patient will return in 1 week for re-evaluation. Wound 07/21/23 Diabetic Ulcer Heel Right (Active)   Wound Image Images linked 07/31/23 1144   Wound Description Black;Eschar;Pink;Yellow;Slough;Brown 07/31/23 1111   Anya-wound Assessment [place] 07/31/23 1111   Wound Length (cm) 3.9 cm 07/31/23 1111   Wound Width (cm) 3.5 cm 07/31/23 1111   Wound Depth (cm) 0.1 cm 07/31/23 1111   Wound Surface Area (cm^2) 13.65 cm^2 07/31/23 1111   Wound Volume (cm^3) 1.365 cm^3 07/31/23 1111   Calculated Wound Volume (cm^3) 1.37 cm^3 07/31/23 1111   Change in Wound Size % -11.38 07/31/23 1111   Drainage Amount Small 07/31/23 1111   Drainage Description Serosanguineous 07/31/23 1111   Dressing Status Intact 07/31/23 1111       Wound 07/21/23 Diabetic Ulcer Heel Right (Active)   Date First Assessed: 07/21/23   Present on Original Admission: Yes  Primary Wound Type: Diabetic Ulcer  Location: Heel  Wound Location Orientation: Right  Wound Description (Comments): SANTANA 1       Subjective:      . LYLA Ramirez presents for wound in right plantar heel. He had it for 1-2 weeks. No pain. He is not sure how it started. History of left leg amputation about 4 years ago. He has chronic LE edema. BS is under control.         The following portions of the patient's history were reviewed and updated as appropriate: allergies, current medications, past family history, past medical history, past social history, past surgical history and problem list.      PAST MEDICAL HISTORY:  Past Medical History:   Diagnosis Date   • Anxiety    • Anxiety and depression    • Cataract    • Congestive cardiac failure ([address])    • Coronary artery disease    • Depression    • Diabetes mellitus ([address])    • Diabetic autonomic neuropathy associated with type 2 diabetes mellitus ([address])     Last Assessed: 12/28/2017   • History of DVT (deep vein thrombosis)     left LE   • Hyperlipidemia    • Lymphedema of right lower extremity    • Obesity    • Orthostatic hypotension        PAST SURGICAL HISTORY:  Past Surgical History:   Procedure Laterality Date   • CORONARY ARTERY BYPASS GRAFT     • EYE SURGERY      cataracts bilateral   • LEG AMPUTATION THROUGH LOWER TIBIA AND FIBULA Left 8/3/2018    Procedure: AMPUTATION BELOW KNEE (BKA) L BKA;  Surgeon: Suzie Lyon MD;  Location: [facility];  Service: Vascular   • [place] CORONARY ARTERY BYP W/VEIN & ARTERY GRAFT 4 VEIN N/A 3/28/2018    Procedure: CORONARY ARTERY BYPASS GRAFT (CABG) x3 VESSELS, LIMA TO LAD, SVG--> PDA, SVG--> OM2,  RIGHT LEG EVH/SVH TO , INTRA-OP AMANDEEP;  Surgeon: Miesha Denise MD;  Location: [facility];  Service: Cardiac Surgery   • ROTATOR CUFF REPAIR Bilateral         ALLERGIES:  Metformin    MEDICATIONS:  Current Outpatient Medications   Medication Sig Dispense Refill   • sodium hypochlorite (DAKIN'S QUARTER-STRENGTH) Irrigate with 1 Application as directed daily 473 mL 1   • ammonium lactate (LAC-HYDRIN) 12 % lotion Apply 1 Application topically in the morning 222 mL 0   • aspirin (ECOTRIN LOW STRENGTH) 81 mg EC tablet Take 81 mg by mouth daily (Patient not taking: Reported on 5/23/2023)     • atorvastatin (LIPITOR) 80 mg tablet Take 1 tablet (80 mg total) by mouth daily 90 tablet 1   • cholecalciferol (VITAMIN D3) 1,000 units tablet Take 1,000 Units by mouth daily     • clopidogrel (PLAVIX) 75 mg tablet TAKE 1 TABLET BY MOUTH DAILY 90 tablet 0   • finasteride (PROSCAR) 5 mg tablet Take 1 tablet (5 mg total) by mouth daily 90 tablet 1   • furosemide (LASIX) 40 mg tablet TAKE 1 TABLET BY MOUTH TWICE  DAILY 200 tablet 2   • glucose blood test strip ReliOn Prime Meter     • Insulin Glargine Solostar (Lantus SoloStar) 100 UNIT/ML SOPN Inject 0.4 mL (40 Units total) under the skin 2 (two) times a day (Patient taking differently: Inject 60 Units under the skin 2 (two) times a day) 135 mL 0   • metoclopramide (REGLAN) 5 mg tablet Take 1 tablet (5 mg total) by mouth daily 90 tablet 1   • midodrine (PROAMATINE) 2.5 mg tablet Take 1 tablet (2.5 mg total) by mouth 3 (three) times a day before meals (Patient not taking: Reported on 2023)  0   • pantoprazole (PROTONIX) 40 mg tablet TAKE 1 TABLET BY MOUTH DAILY IN  THE EARLY MORNING 90 tablet 0   • tamsulosin (FLOMAX) 0.4 mg Take 1 capsule (0.4 mg total) by mouth daily with dinner 90 capsule 1     No current facility-administered medications for this visit.        SOCIAL HISTORY:  Social History     Socioeconomic History   • Marital status: /Civil Union     Spouse name: None   • Number of children: None   • Years of education: None   • Highest education level: None   Occupational History   • None   Tobacco Use   • Smoking status: Former     Packs/day: 1.00     Years: 12.00     Total pack years: 12.00     Types: Cigarettes     Quit date: 3/23/1994     Years since quittin.3   • Smokeless tobacco: Never   Vaping Use   • Vaping Use: Never used   Substance and Sexual Activity   • Alcohol use: No     Comment: rarely   • Drug use: No   • Sexual activity: Not Currently   Other Topics Concern   • None   Social History Narrative   • None     Social Determinants of Health     Financial Resource Strain: Not on file   Food Insecurity: No Food Insecurity (2023)    Hunger Vital Sign    • Worried About Running Out of Food in the Last Year: Never true    • Ran Out of Food in the Last Year: Never true   Transportation Needs: No Transportation Needs (2023)    PRAPARE - Transportation    • Lack of Transportation (Medical): No    • Lack of Transportation (Non-Medical): No   Physical Activity: Not on file   Stress: Not on file   Social Connections: Moderately Isolated (10/29/2020)    Social Connection and Isolation Panel [NHANES]    • Frequency of Communication with Friends and Family: Three times a week    • Frequency of Social Gatherings with Friends and Family: Three times a week    • Attends Sabianism Services: Never    • Active Member of Clubs or Organizations: No    • Attends Club or Organization Meetings: Never    • Marital Status:    Intimate Partner Violence: Not At Risk (10/29/2020)    Humiliation, Afraid, Rape, and Kick questionnaire    • Fear of Current or Ex-Partner: No    • Emotionally Abused: No    • Physically Abused: No    • Sexually Abused: No   Housing Stability: Low Risk  (4/19/2023)    Housing Stability Vital Sign    • Unable to Pay for Housing in the Last Year: No    • Number of [address] in the Last Year: 1    • Unstable Housing in the Last Year: No      Review of Systems   Constitutional: Negative for chills and fever. Respiratory: Negative for cough and shortness of breath. Cardiovascular: Positive for leg swelling. Negative for chest pain. Gastrointestinal: Negative for nausea and vomiting. Skin: Positive for wound. Neurological: Positive for numbness.          Objective:       Wound 07/21/23 Diabetic Ulcer Heel Right (Active)   Wound Image Images linked 07/31/23 1144   Wound Description Black;Eschar;Pink;Yellow;Slough;Brown 07/31/23 1111   Anya-wound Assessment [place] 07/31/23 1111   Wound Length (cm) 3.9 cm 07/31/23 1111   Wound Width (cm) 3.5 cm 07/31/23 1111   Wound Depth (cm) 0.1 cm 07/31/23 1111   Wound Surface Area (cm^2) 13.65 cm^2 07/31/23 1111   Wound Volume (cm^3) 1.365 cm^3 07/31/23 1111   Calculated Wound Volume (cm^3) 1.37 cm^3 07/31/23 1111   Change in Wound Size % -11.38 07/31/23 1111   Drainage Amount Small 07/31/23 1111   Drainage Description Serosanguineous 07/31/23 1111   Dressing Status Intact 07/31/23 1111       /64   Pulse 64   Temp (!) 96.5 °F (35.8 °C)   Resp 18   Ht 6' 1" (1.854 m)   Wt 118 kg (260 lb)   BMI 34.30 kg/m²     Physical Exam  Vitals and nursing note reviewed. Constitutional:       General: He is not in acute distress. Appearance: He is not toxic-appearing or diaphoretic. Cardiovascular:      Rate and Rhythm: Normal rate and regular rhythm. Pulses:           Dorsalis pedis pulses are 1+ on the right side. Posterior tibial pulses are 0 on the right side. Comments: Chronic lymphedema presents RLE. Pulmonary:      Effort: Pulmonary effort is normal. No respiratory distress. Musculoskeletal:         General: No tenderness or signs of injury. Right lower leg: Edema present. Right foot: No foot drop. Comments: BKA left. Left Lower Extremity: Left leg is amputated below knee. Skin:     General: Skin is warm. Capillary Refill: Capillary refill takes less than 2 seconds. Coloration: Skin is not cyanotic or mottled. Findings: Wound present. No abscess. Nails: There is no clubbing. Comments: Eschar presents right plantar heel. Wound debrided. No deep probing or sinus tract. Wound bed is still ecchymotic. No abscess or signs of infection. See wound assessment and photo. Neurological:      General: No focal deficit present. Mental Status: He is alert and oriented to person, place, and time. Cranial Nerves: No cranial nerve deficit. Sensory: Sensory deficit present. Motor: No weakness. Psychiatric:         Mood and Affect: Mood normal.         Behavior: Behavior normal.         Thought Content:  Thought content normal.         Judgment: Judgment normal.           Wound Instructions:  Orders Placed This Encounter   Procedures   • Debridement     This order was created via procedure documentation   • Wound cleansing and dressings RIGHT HEEL WOUND:    Do not get dressing wet, sponge bath at this time      Apply quarter strength dakins moistened gauze to wound. Cover with gauze/ABD  Secure with rolled gauze and tape  Change dressing every other day and as needed for excessive drainage    Above performed at wound care center today         Ace Wrap    elastic bandage: Apply from base of toes to behind the knee. Apply in AM, may remove for sleep or leave intact    Avoid prolonged standing in one place. Elevate leg(s) above the level of the heart when sitting or as much as possible. Standing Status:   Future     Standing Expiration Date:   7/31/2024        Diagnosis ICD-10-CM Associated Orders   1. Ulcer of right heel, with fat layer exposed ([address])  L97.412 Debridement     sodium hypochlorite (DAKIN'S QUARTER-STRENGTH)     Wound cleansing and dressings      2. Diabetic polyneuropathy associated with type 2 diabetes mellitus ([facility]CC)  E11.42 Debridement      3. Lymphedema  I89.0       4.  Status post below knee amputation, left ([address])  N18.958

## 2023-07-31 NOTE — PATIENT INSTRUCTIONS
Orders Placed This Encounter   Procedures    Debridement     This order was created via procedure documentation    Wound cleansing and dressings     RIGHT HEEL WOUND:    Do not get dressing wet, sponge bath at this time      Apply quarter strength dakins moistened gauze to wound. Cover with gauze/ABD  Secure with rolled gauze and tape  Change dressing every other day and as needed for excessive drainage    Above performed at wound care center today         Ace Wrap    elastic bandage: Apply from base of toes to behind the knee. Apply in AM, may remove for sleep or leave intact    Avoid prolonged standing in one place. Elevate leg(s) above the level of the heart when sitting or as much as possible.      Standing Status:   Future     Standing Expiration Date:   7/31/2024

## 2023-08-02 ENCOUNTER — HOME CARE VISIT (OUTPATIENT)
Dept: HOME HEALTH SERVICES | Facility: HOME HEALTHCARE | Age: 63
End: 2023-08-02
Payer: COMMERCIAL

## 2023-08-02 ENCOUNTER — TELEPHONE (OUTPATIENT)
Dept: WOUND CARE | Facility: CLINIC | Age: 63
End: 2023-08-02

## 2023-08-02 VITALS
DIASTOLIC BLOOD PRESSURE: 72 MMHG | HEART RATE: 66 BPM | SYSTOLIC BLOOD PRESSURE: 138 MMHG | TEMPERATURE: 97.3 F | OXYGEN SATURATION: 99 %

## 2023-08-02 PROCEDURE — G0299 HHS/HOSPICE OF RN EA 15 MIN: HCPCS

## 2023-08-02 NOTE — CASE COMMUNICATION
Ship to Pt.  Home   Insurance [facility]   Wound 1 R heel      Gauze Sunny stretch roll 4inch n/s 12 rolls per unit  [id number]

## 2023-08-02 NOTE — TELEPHONE ENCOUNTER
Emmanuelle from [facility] stated the patient did not get Dakins solution yet, so she needs to know what to put on the patient's wound.  The wound nurse sent Dr. Guillen a Tiger Txt. Dr. Guillen stated [facility] can use silver gel. The wound nurse informed Emmanuelle from [facility].

## 2023-08-04 ENCOUNTER — HOME CARE VISIT (OUTPATIENT)
Dept: HOME HEALTH SERVICES | Facility: HOME HEALTHCARE | Age: 63
End: 2023-08-04
Payer: COMMERCIAL

## 2023-08-04 VITALS
DIASTOLIC BLOOD PRESSURE: 72 MMHG | HEART RATE: 68 BPM | RESPIRATION RATE: 20 BRPM | TEMPERATURE: 96.8 F | SYSTOLIC BLOOD PRESSURE: 142 MMHG | OXYGEN SATURATION: 97 %

## 2023-08-04 PROCEDURE — G0299 HHS/HOSPICE OF RN EA 15 MIN: HCPCS

## 2023-08-06 ENCOUNTER — HOME CARE VISIT (OUTPATIENT)
Dept: HOME HEALTH SERVICES | Facility: HOME HEALTHCARE | Age: 63
End: 2023-08-06
Payer: COMMERCIAL

## 2023-08-06 VITALS
DIASTOLIC BLOOD PRESSURE: 78 MMHG | RESPIRATION RATE: 18 BRPM | SYSTOLIC BLOOD PRESSURE: 128 MMHG | TEMPERATURE: 97.2 F | HEART RATE: 76 BPM | OXYGEN SATURATION: 96 %

## 2023-08-06 PROCEDURE — G0299 HHS/HOSPICE OF RN EA 15 MIN: HCPCS

## 2023-08-07 ENCOUNTER — OFFICE VISIT (OUTPATIENT)
Dept: WOUND CARE | Facility: CLINIC | Age: 63
End: 2023-08-07
Payer: COMMERCIAL

## 2023-08-07 VITALS
DIASTOLIC BLOOD PRESSURE: 80 MMHG | TEMPERATURE: 97.6 F | HEART RATE: 64 BPM | RESPIRATION RATE: 18 BRPM | SYSTOLIC BLOOD PRESSURE: 160 MMHG

## 2023-08-07 DIAGNOSIS — L97.412 DIABETIC ULCER OF RIGHT HEEL ASSOCIATED WITH TYPE 2 DIABETES MELLITUS, WITH FAT LAYER EXPOSED (HCC): Primary | ICD-10-CM

## 2023-08-07 DIAGNOSIS — I89.0 LYMPHEDEMA: ICD-10-CM

## 2023-08-07 DIAGNOSIS — E11.42 DIABETIC POLYNEUROPATHY ASSOCIATED WITH TYPE 2 DIABETES MELLITUS (HCC): ICD-10-CM

## 2023-08-07 DIAGNOSIS — E11.621 DIABETIC ULCER OF RIGHT HEEL ASSOCIATED WITH TYPE 2 DIABETES MELLITUS, WITH FAT LAYER EXPOSED (HCC): Primary | ICD-10-CM

## 2023-08-07 PROCEDURE — 11042 DBRDMT SUBQ TIS 1ST 20SQCM/<: CPT | Performed by: FAMILY MEDICINE

## 2023-08-07 NOTE — PATIENT INSTRUCTIONS
Orders Placed This Encounter   Procedures    Wound cleansing and dressings     Wound cleansing and dressings       RIGHT HEEL WOUND:     Do not get dressing wet, sponge bath at this time        Apply quarter strength dakins moistened gauze to wound. Cover with gauze/ABD  Secure with rolled gauze and tape  Change dressing every other day and as needed for excessive drainage     Above performed at wound care center today   Continue with [facility] for skilled dressing changes three times a week except on days he is coming to wound center           Ace Wrap     elastic bandage: Apply from base of toes to behind the knee. Apply in AM, may remove for sleep or leave intact     Avoid prolonged standing in one place. Elevate leg(s) above the level of the heart when sitting or as much as possible.      Standing Status:   Future     Standing Expiration Date:   8/7/2024

## 2023-08-07 NOTE — PROGRESS NOTES
Patient ID: Griselda Estrada is a 58 y.o. male Date of Birth 1960       Chief Complaint   Patient presents with   • Follow Up Wound Care Visit     Right heel wound       Allergies:  Metformin    Diagnosis:      Diagnosis ICD-10-CM Associated Orders   1. Diabetic ulcer of right heel associated with type 2 diabetes mellitus, with fat layer exposed ([address])  F19.582 Wound cleansing and dressings    L97.412 Debridement      2. Lymphedema  I89.0       3. Diabetic polyneuropathy associated with type 2 diabetes mellitus ([facility])  E11.42               Assessment & Plan:  DFU 1 of the right heel. More necrotic tissue today. Overall slightly smaller. Although there is surrounding erythema, no obvious infection at this time. Surgical debridement. Discontinue Dakin's. Endoform AM, alginate and ABD. Continue with offloading. Follow-up with podiatry as scheduled. He will follow-up in 2 weeks and continue with VNA with dressing changes 3 times a week. Subjective:   8/7/2023: Follow-up DFU one of the right plantar heel. Lymphedema. No specific complaints. Has been using Dakin soaks. No fever or chills.       The following portions of the patient's history were reviewed and updated as appropriate:   Patient Active Problem List   Diagnosis   • Anxiety and depression   • Type 2 diabetes mellitus with diabetic neuropathy, with long-term current use of insulin ([address])   • Hyperlipidemia   • 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   • Vitamin D deficiency   • PAD (peripheral artery disease) ([facility])   • S/P BKA (below knee amputation), left ([facility])   • Orthostatic hypotension   • Diabetic gastroparesis ([facility])   • Class 2 severe obesity due to excess calories with serious comorbidity and body mass index (BMI) of 35.0 to 35.9 in adult [facility])   • Triple vessel coronary artery disease   • Hypertensive heart disease with congestive heart failure ([facility])   • Chronic diastolic heart failure ([address])   • Other constipation   • S/P CABG x 3   • Diabetic autonomic neuropathy associated with type 2 diabetes mellitus ([facility])   • Hyponatremia   • Obstructive sleep apnea   • Amputated below knee, left ([facility])   • Phantom limb syndrome without pain ([facility])   • CAD (coronary artery disease)   • Nodular rash   • Elephantiasis nostras verrucosa   • Other acute osteomyelitis, left ankle and foot ([facility])   • Embolism and thrombosis of arteries of the lower extremities ([facility])   • Lymphedema of right lower extremity   • Venous stasis dermatitis of right lower extremity   • Abdominal distension   • Non-pressure chronic ulcer of right calf with fat layer exposed ([address])   • Cellulitis of right ankle   • Fracture, toe   • Lymphedema in adult patient     Past Medical History:   Diagnosis Date   • Anxiety    • Anxiety and depression    • Cataract    • Congestive cardiac failure ([facility])    • Coronary artery disease    • Depression    • Diabetes mellitus ([address])    • Diabetic autonomic neuropathy associated with type 2 diabetes mellitus ([facility])     Last Assessed: 12/28/2017   • History of DVT (deep vein thrombosis)     left LE   • Hyperlipidemia    • Lymphedema of right lower extremity    • Obesity    • Orthostatic hypotension      Past Surgical History:   Procedure Laterality Date   • CORONARY ARTERY BYPASS GRAFT     • EYE SURGERY      cataracts bilateral   • LEG AMPUTATION THROUGH LOWER TIBIA AND FIBULA Left 8/3/2018    Procedure: AMPUTATION BELOW KNEE (BKA) L BKA;  Surgeon: Lou Bansal MD;  Location: [facility];  Service: Vascular   • IN CORONARY ARTERY BYP W/VEIN & ARTERY GRAFT 4 VEIN N/A 3/28/2018    Procedure: CORONARY ARTERY BYPASS GRAFT (CABG) x3 VESSELS, LIMA TO LAD, SVG--> PDA, SVG--> OM2,  RIGHT LEG EVH/SVH TO , INTRA-OP AMANDEEP;  Surgeon: Jake Ferro MD;  Location: [facility];  Service: Cardiac Surgery   • ROTATOR CUFF REPAIR Bilateral      Family History   Problem Relation Age of Onset   • Atrial fibrillation Mother    • Stroke Mother    • Hypertension Father    • Heart attack Paternal Grandfather 61   • Diabetes Maternal Grandmother    • Diabetes Maternal Grandfather    • Diabetes Maternal Aunt    • Diabetes Maternal Uncle       Social History     Socioeconomic History   • Marital status: /Civil Union     Spouse name: None   • Number of children: None   • Years of education: None   • Highest education level: None   Occupational History   • None   Tobacco Use   • Smoking status: Former     Packs/day: 1.00     Years: 12.00     Total pack years: 12.00     Types: Cigarettes     Quit date: 3/23/1994     Years since quittin.3   • Smokeless tobacco: Never   Vaping Use   • Vaping Use: Never used   Substance and Sexual Activity   • Alcohol use: No     Comment: rarely   • Drug use: No   • Sexual activity: Not Currently   Other Topics Concern   • None   Social History Narrative   • None     Social Determinants of Health     Financial Resource Strain: Not on file   Food Insecurity: No Food Insecurity (2023)    Hunger Vital Sign    • Worried About Running Out of Food in the Last Year: Never true    • Ran Out of Food in the Last Year: Never true   Transportation Needs: No Transportation Needs (2023)    PRAPARE - Transportation    • Lack of Transportation (Medical): No    • Lack of Transportation (Non-Medical): No   Physical Activity: Not on file   Stress: Not on file   Social Connections: Moderately Isolated (10/29/2020)    Social Connection and Isolation Panel [NHANES]    • Frequency of Communication with Friends and Family: Three times a week    • Frequency of Social Gatherings with Friends and Family:  Three times a week    • Attends Restorationist Services: Never    • Active Member of Clubs or Organizations: No    • Attends Club or Organization Meetings: Never    • Marital Status:    Intimate Partner Violence: Not At Risk (10/29/2020)    Humiliation, Afraid, Rape, and Kick questionnaire    • Fear of Current or Ex-Partner: No • Emotionally Abused: No    • Physically Abused: No    • Sexually Abused: No   Housing Stability: Low Risk  (4/19/2023)    Housing Stability Vital Sign    • Unable to Pay for Housing in the Last Year: No    • Number of Places Lived in the Last Year: 1    • Unstable Housing in the Last Year: No        Current Outpatient Medications:   •  ammonium lactate (LAC-HYDRIN) 12 % lotion, Apply 1 Application topically in the morning, Disp: 222 mL, Rfl: 0  •  aspirin (ECOTRIN LOW STRENGTH) 81 mg EC tablet, Take 81 mg by mouth daily (Patient not taking: Reported on 5/23/2023), Disp: , Rfl:   •  atorvastatin (LIPITOR) 80 mg tablet, Take 1 tablet (80 mg total) by mouth daily, Disp: 90 tablet, Rfl: 1  •  cholecalciferol (VITAMIN D3) 1,000 units tablet, Take 1,000 Units by mouth daily, Disp: , Rfl:   •  clopidogrel (PLAVIX) 75 mg tablet, TAKE 1 TABLET BY MOUTH DAILY, Disp: 90 tablet, Rfl: 0  •  finasteride (PROSCAR) 5 mg tablet, Take 1 tablet (5 mg total) by mouth daily, Disp: 90 tablet, Rfl: 1  •  furosemide (LASIX) 40 mg tablet, TAKE 1 TABLET BY MOUTH TWICE  DAILY, Disp: 200 tablet, Rfl: 2  •  glucose blood test strip, ReliOn Prime Meter, Disp: , Rfl:   •  Insulin Glargine Solostar (Lantus SoloStar) 100 UNIT/ML SOPN, Inject 0.4 mL (40 Units total) under the skin 2 (two) times a day (Patient taking differently: Inject 60 Units under the skin 2 (two) times a day), Disp: 135 mL, Rfl: 0  •  metoclopramide (REGLAN) 5 mg tablet, Take 1 tablet (5 mg total) by mouth daily, Disp: 90 tablet, Rfl: 1  •  midodrine (PROAMATINE) 2.5 mg tablet, Take 1 tablet (2.5 mg total) by mouth 3 (three) times a day before meals (Patient not taking: Reported on 5/23/2023), Disp: , Rfl: 0  •  pantoprazole (PROTONIX) 40 mg tablet, TAKE 1 TABLET BY MOUTH DAILY IN  THE EARLY MORNING, Disp: 90 tablet, Rfl: 0  •  sodium hypochlorite (DAKIN'S QUARTER-STRENGTH), Irrigate with 1 Application as directed daily, Disp: 473 mL, Rfl: 1  •  tamsulosin (FLOMAX) 0.4 mg, Take 1 capsule (0.4 mg total) by mouth daily with dinner, Disp: 90 capsule, Rfl: 1    Review of Systems   Constitutional: Negative for chills and fever. Respiratory: Negative for cough and shortness of breath. Cardiovascular: Positive for leg swelling (Lymphedema right lower extremity). Negative for chest pain. Musculoskeletal: Positive for gait problem ( Left BKA). Skin: Positive for wound ( Right heel). Neurological: Positive for numbness. Psychiatric/Behavioral: Negative for confusion. The patient is not nervous/anxious. Objective:  /80   Pulse 64   Temp 97.6 °F (36.4 °C)   Resp 18   Pain Score: 0-No pain     Physical Exam  Vitals and nursing note reviewed. Constitutional:       General: He is not in acute distress. Appearance: Normal appearance. He is not toxic-appearing or diaphoretic. HENT:      Head: Normocephalic and atraumatic. Cardiovascular:      Rate and Rhythm: Normal rate. Pulses:           Dorsalis pedis pulses are 1+ on the right side. Posterior tibial pulses are 0 on the right side. Comments: Chronic lymphedema presents RLE. Pulmonary:      Effort: Pulmonary effort is normal. No respiratory distress. Musculoskeletal:         General: No tenderness or signs of injury. Right lower leg: Edema present. Right foot: No foot drop. Feet:       Comments: BKA left. Left Lower Extremity: Left leg is amputated below knee. Feet:      Right foot:      Skin integrity: Ulcer and erythema present. Comments: Ulcer on the left heel with necrotic adipose tissue, slough and fibrin. Surrounding erythema but no obvious infection. Slight malodor. See full wound description and photo. Skin:     General: Skin is warm. Capillary Refill: Capillary refill takes less than 2 seconds. Coloration: Skin is not cyanotic or mottled. Findings: Wound present. No abscess. Nails: There is no clubbing.    Neurological:      Mental Status: He is alert and oriented to person, place, and time. Sensory: Sensory deficit present. Psychiatric:         Mood and Affect: Mood and affect normal.         Cognition and Memory: Cognition normal.          Wound 07/21/23 Diabetic Ulcer Heel Right (Active)   Wound Image       08/07/23 1503   Wound Description Black;Eschar;Yellow 08/07/23 1419   Anya-wound Assessment Erythema; Excoriated;Pink 08/07/23 1419   Wound Length (cm) 3.9 cm 08/07/23 1419   Wound Width (cm) 3.5 cm 08/07/23 1419   Wound Depth (cm) 0.1 cm 07/31/23 1111   Wound Surface Area (cm^2) 13.65 cm^2 08/07/23 1419   Wound Volume (cm^3) 1.365 cm^3 07/31/23 1111   Calculated Wound Volume (cm^3) 1.37 cm^3 07/31/23 1111   Change in Wound Size % -11.38 07/31/23 1111   Drainage Amount Moderate 08/07/23 1419   Drainage Description Serosanguineous; Yellow 08/07/23 1419   Dressing Changed Changed 08/07/23 1419   Patient Tolerance Tolerated well 08/07/23 1419   Dressing Status Removed 08/07/23 1419                          Debridement   Wound 07/21/23 Diabetic Ulcer Heel Right    Universal Protocol:  Consent: Verbal consent obtained. Written consent obtained. Consent given by: patient  Time out: Immediately prior to procedure a "time out" was called to verify the correct patient, procedure, equipment, support staff and site/side marked as required.   Patient understanding: patient states understanding of the procedure being performed  Patient identity confirmed: verbally with patient      Performed by: physician  Debridement type: surgical  Level of debridement: subcutaneous tissue  Pain control: lidocaine 4%  Post-debridement measurements  Length (cm): 4  Width (cm): 3.6  Depth (cm): 0.2  Percent debrided: 100%  Surface Area (cm^2): 14.4  Area debrided (cm^2): 14.4  Volume (cm^3): 2.88  Tissue and other material debrided: adipose, dermis and subcutaneous tissue  Devitalized tissue debrided: fibrin, necrotic debris and slough  Instrument(s) utilized: curette and forceps  Bleeding: medium  Hemostasis obtained with: pressure  Procedural pain (0-10): insensate  Post-procedural pain: insensate   Response to treatment: procedure was tolerated well               Results from last 6 Months   Lab Units 04/17/23 2139   WOUND CULTURE  2+ Growth of - Pantoea septica Pantoea species*         Lab Results   Component Value Date    HGBA1C 6.7 (A) 02/02/2023       Wound Instructions:  Orders Placed This Encounter   Procedures   • Wound cleansing and dressings     Wound cleansing and dressings       RIGHT HEEL WOUND:     Do not get dressing wet, sponge bath at this time        Apply quarter strength dakins moistened gauze to wound. Cover with gauze/ABD  Secure with rolled gauze and tape  Change dressing every other day and as needed for excessive drainage     Above performed at wound care center today   Continue with VNA for skilled dressing changes three times a week except on days he is coming to wound center           Ace Wrap     elastic bandage: Apply from base of toes to behind the knee. Apply in AM, may remove for sleep or leave intact     Avoid prolonged standing in one place.     Elevate leg(s) above the level of the heart when sitting or as much as possible. Standing Status:   Future     Standing Expiration Date:   8/7/2024   • Debridement     This order was created via procedure documentation             Loni Schroeder MD, CHT, CWS    Portions of the record may have been created with voice recognition software. Occasional wrong word or "sound alike" substitutions may have occurred due to the inherent limitations of voice recognition software. Read the chart carefully and recognize, using context, where substitutions have occurred.

## 2023-08-08 ENCOUNTER — HOME CARE VISIT (OUTPATIENT)
Dept: HOME HEALTH SERVICES | Facility: HOME HEALTHCARE | Age: 63
End: 2023-08-08
Payer: COMMERCIAL

## 2023-08-08 VITALS
SYSTOLIC BLOOD PRESSURE: 120 MMHG | HEART RATE: 90 BPM | OXYGEN SATURATION: 100 % | RESPIRATION RATE: 20 BRPM | DIASTOLIC BLOOD PRESSURE: 60 MMHG | TEMPERATURE: 98.6 F

## 2023-08-08 PROCEDURE — G0300 HHS/HOSPICE OF LPN EA 15 MIN: HCPCS

## 2023-08-10 ENCOUNTER — HOME CARE VISIT (OUTPATIENT)
Dept: HOME HEALTH SERVICES | Facility: HOME HEALTHCARE | Age: 63
End: 2023-08-10
Payer: COMMERCIAL

## 2023-08-10 VITALS
SYSTOLIC BLOOD PRESSURE: 130 MMHG | TEMPERATURE: 98.2 F | HEART RATE: 90 BPM | OXYGEN SATURATION: 98 % | RESPIRATION RATE: 18 BRPM | DIASTOLIC BLOOD PRESSURE: 80 MMHG

## 2023-08-10 PROCEDURE — G0300 HHS/HOSPICE OF LPN EA 15 MIN: HCPCS

## 2023-08-12 ENCOUNTER — HOME CARE VISIT (OUTPATIENT)
Dept: HOME HEALTH SERVICES | Facility: HOME HEALTHCARE | Age: 63
End: 2023-08-12
Payer: COMMERCIAL

## 2023-08-12 VITALS
HEART RATE: 64 BPM | DIASTOLIC BLOOD PRESSURE: 90 MMHG | SYSTOLIC BLOOD PRESSURE: 152 MMHG | TEMPERATURE: 97.3 F | OXYGEN SATURATION: 94 % | RESPIRATION RATE: 20 BRPM

## 2023-08-12 PROCEDURE — G0299 HHS/HOSPICE OF RN EA 15 MIN: HCPCS

## 2023-08-14 ENCOUNTER — HOME CARE VISIT (OUTPATIENT)
Dept: HOME HEALTH SERVICES | Facility: HOME HEALTHCARE | Age: 63
End: 2023-08-14
Payer: COMMERCIAL

## 2023-08-14 PROCEDURE — G0300 HHS/HOSPICE OF LPN EA 15 MIN: HCPCS

## 2023-08-16 ENCOUNTER — VBI (OUTPATIENT)
Dept: ADMINISTRATIVE | Facility: OTHER | Age: 63
End: 2023-08-16

## 2023-08-16 ENCOUNTER — HOME CARE VISIT (OUTPATIENT)
Dept: HOME HEALTH SERVICES | Facility: HOME HEALTHCARE | Age: 63
End: 2023-08-16
Payer: COMMERCIAL

## 2023-08-16 VITALS
OXYGEN SATURATION: 100 % | SYSTOLIC BLOOD PRESSURE: 132 MMHG | TEMPERATURE: 98 F | HEART RATE: 80 BPM | RESPIRATION RATE: 18 BRPM | DIASTOLIC BLOOD PRESSURE: 78 MMHG

## 2023-08-16 PROCEDURE — G0300 HHS/HOSPICE OF LPN EA 15 MIN: HCPCS

## 2023-08-18 ENCOUNTER — HOME CARE VISIT (OUTPATIENT)
Dept: HOME HEALTH SERVICES | Facility: HOME HEALTHCARE | Age: 63
End: 2023-08-18
Payer: COMMERCIAL

## 2023-08-18 PROCEDURE — G0299 HHS/HOSPICE OF RN EA 15 MIN: HCPCS

## 2023-08-19 VITALS
DIASTOLIC BLOOD PRESSURE: 80 MMHG | OXYGEN SATURATION: 99 % | RESPIRATION RATE: 18 BRPM | TEMPERATURE: 97.1 F | SYSTOLIC BLOOD PRESSURE: 144 MMHG | HEART RATE: 75 BPM

## 2023-08-20 ENCOUNTER — HOME CARE VISIT (OUTPATIENT)
Dept: HOME HEALTH SERVICES | Facility: HOME HEALTHCARE | Age: 63
End: 2023-08-20
Payer: COMMERCIAL

## 2023-08-20 VITALS
SYSTOLIC BLOOD PRESSURE: 148 MMHG | DIASTOLIC BLOOD PRESSURE: 78 MMHG | RESPIRATION RATE: 16 BRPM | HEART RATE: 67 BPM | OXYGEN SATURATION: 93 % | TEMPERATURE: 96.7 F

## 2023-08-20 PROCEDURE — G0299 HHS/HOSPICE OF RN EA 15 MIN: HCPCS

## 2023-08-22 ENCOUNTER — OFFICE VISIT (OUTPATIENT)
Dept: WOUND CARE | Facility: CLINIC | Age: 63
End: 2023-08-22
Payer: COMMERCIAL

## 2023-08-22 ENCOUNTER — HOME CARE VISIT (OUTPATIENT)
Dept: HOME HEALTH SERVICES | Facility: HOME HEALTHCARE | Age: 63
End: 2023-08-22
Payer: COMMERCIAL

## 2023-08-22 VITALS
HEART RATE: 64 BPM | RESPIRATION RATE: 18 BRPM | SYSTOLIC BLOOD PRESSURE: 142 MMHG | DIASTOLIC BLOOD PRESSURE: 70 MMHG | TEMPERATURE: 98.2 F

## 2023-08-22 DIAGNOSIS — E11.621 DIABETIC ULCER OF RIGHT HEEL ASSOCIATED WITH TYPE 2 DIABETES MELLITUS, WITH FAT LAYER EXPOSED (HCC): Primary | ICD-10-CM

## 2023-08-22 DIAGNOSIS — L97.412 DIABETIC ULCER OF RIGHT HEEL ASSOCIATED WITH TYPE 2 DIABETES MELLITUS, WITH FAT LAYER EXPOSED (HCC): Primary | ICD-10-CM

## 2023-08-22 PROCEDURE — 11045 DBRDMT SUBQ TISS EACH ADDL: CPT | Performed by: PODIATRIST

## 2023-08-22 PROCEDURE — 11042 DBRDMT SUBQ TIS 1ST 20SQCM/<: CPT | Performed by: PODIATRIST

## 2023-08-22 NOTE — PROGRESS NOTES
Patient ID: Jairo Cooley is a 58 y.o. male Date of Birth 1960     Diagnosis:  1. Diabetic ulcer of right heel associated with type 2 diabetes mellitus, with fat layer exposed ([address])  -     Wound cleansing and dressings; Future  -     XR heel / calcaneus 2+ vw right; Future; Expected date: 08/22/2023  -     Debridement       Diagnosis ICD-10-CM Associated Orders   1. Diabetic ulcer of right heel associated with type 2 diabetes mellitus, with fat layer exposed ([address])  E11.621 Wound cleansing and dressings    L97.412 XR heel / calcaneus 2+ vw right     Debridement           Assessment & Plan:  1. Wound again is larger. HE is still sitting at home resting the heel on a pillow on the floor. I added felt padding to his boot to try to get his heel off the floor. With his ambulatory issues and limited ability to care fo rhimself, he is very high risk for an infection/limb loss. 2. Serial XR ordered  3. See debridement below    Chief Complaint   Patient presents with   • Follow Up Wound Care Visit     R plantar heel wound           Subjective:   Patient returns for care of right heel wound. He tries to use the wheelchair when going to the restroom.        The following portions of the patient's history were reviewed and updated as appropriate:   Patient Active Problem List   Diagnosis   • Anxiety and depression   • Type 2 diabetes mellitus with diabetic neuropathy, with long-term current use of insulin ([address])   • Hyperlipidemia   • 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   • Vitamin D deficiency   • PAD (peripheral artery disease) ([facility])   • S/P BKA (below knee amputation), left ([facility])   • Orthostatic hypotension   • Diabetic gastroparesis ([facility])   • Class 2 severe obesity due to excess calories with serious comorbidity and body mass index (BMI) of 35.0 to 35.9 in adult [facility])   • Triple vessel coronary artery disease   • Hypertensive heart disease with congestive heart failure [facility])   • Chronic diastolic heart failure (HCC)   • Other constipation   • S/P CABG x 3   • Diabetic autonomic neuropathy associated with type 2 diabetes mellitus (HCC)   • Hyponatremia   • Obstructive sleep apnea   • Amputated below knee, left (HCC)   • Phantom limb syndrome without pain (HCC)   • CAD (coronary artery disease)   • Nodular rash   • Elephantiasis nostras verrucosa   • Other acute osteomyelitis, left ankle and foot ([facility])   • Embolism and thrombosis of arteries of the lower extremities ([facility])   • Lymphedema of right lower extremity   • Venous stasis dermatitis of right lower extremity   • Abdominal distension   • Non-pressure chronic ulcer of right calf with fat layer exposed ([address])   • Cellulitis of right ankle   • Fracture, toe   • Lymphedema in adult patient     Past Medical History:   Diagnosis Date   • Anxiety    • Anxiety and depression    • Cataract    • Congestive cardiac failure ([address])    • Coronary artery disease    • Depression    • Diabetes mellitus ([address])    • Diabetic autonomic neuropathy associated with type 2 diabetes mellitus ([address])     Last Assessed: 12/28/2017   • History of DVT (deep vein thrombosis)     left LE   • Hyperlipidemia    • Lymphedema of right lower extremity    • Obesity    • Orthostatic hypotension      Past Surgical History:   Procedure Laterality Date   • CORONARY ARTERY BYPASS GRAFT     • EYE SURGERY      cataracts bilateral   • LEG AMPUTATION THROUGH LOWER TIBIA AND FIBULA Left 8/3/2018    Procedure: AMPUTATION BELOW KNEE (BKA) L BKA;  Surgeon: Antoinette Duff MD;  Location: [facility];  Service: Vascular   • DC CORONARY ARTERY BYP W/VEIN & ARTERY GRAFT 4 VEIN N/A 3/28/2018    Procedure: CORONARY ARTERY BYPASS GRAFT (CABG) x3 VESSELS, LIMA TO LAD, SVG--> PDA, SVG--> OM2,  RIGHT LEG EVH/SVH TO , INTRA-OP AMANDEEP;  Surgeon: Lavon Valle MD;  Location: [facility];  Service: Cardiac Surgery   • ROTATOR CUFF REPAIR Bilateral      Social History     Socioeconomic History   • Marital status: /Civil Union     Spouse name: None   • Number of children: None   • Years of education: None   • Highest education level: None   Occupational History   • None   Tobacco Use   • Smoking status: Former     Packs/day: 1.00     Years: 12.00     Total pack years: 12.00     Types: Cigarettes     Quit date: 3/23/1994     Years since quittin.4   • Smokeless tobacco: Never   Vaping Use   • Vaping Use: Never used   Substance and Sexual Activity   • Alcohol use: No     Comment: rarely   • Drug use: No   • Sexual activity: Not Currently   Other Topics Concern   • None   Social History Narrative   • None     Social Determinants of Health     Financial Resource Strain: Not on file   Food Insecurity: No Food Insecurity (2023)    Hunger Vital Sign    • Worried About Running Out of Food in the Last Year: Never true    • Ran Out of Food in the Last Year: Never true   Transportation Needs: No Transportation Needs (2023)    PRAPARE - Transportation    • Lack of Transportation (Medical): No    • Lack of Transportation (Non-Medical): No   Physical Activity: Not on file   Stress: Not on file   Social Connections: Moderately Isolated (10/29/2020)    Social Connection and Isolation Panel [NHANES]    • Frequency of Communication with Friends and Family: Three times a week    • Frequency of Social Gatherings with Friends and Family:  Three times a week    • Attends Rastafari Services: Never    • Active Member of Clubs or Organizations: No    • Attends Club or Organization Meetings: Never    • Marital Status:    Intimate Partner Violence: Not At Risk (10/29/2020)    Humiliation, Afraid, Rape, and Kick questionnaire    • Fear of Current or Ex-Partner: No    • Emotionally Abused: No    • Physically Abused: No    • Sexually Abused: No   Housing Stability: Low Risk  (2023)    Housing Stability Vital Sign    • Unable to Pay for Housing in the Last Year: No    • Number of [address] in the Last Year: 1    • Unstable Housing in the Last Year: No        Current Outpatient Medications:   •  ammonium lactate (LAC-HYDRIN) 12 % lotion, Apply 1 Application topically in the morning, Disp: 222 mL, Rfl: 0  •  aspirin (ECOTRIN LOW STRENGTH) 81 mg EC tablet, Take 81 mg by mouth daily (Patient not taking: Reported on 5/23/2023), Disp: , Rfl:   •  atorvastatin (LIPITOR) 80 mg tablet, Take 1 tablet (80 mg total) by mouth daily, Disp: 90 tablet, Rfl: 1  •  cholecalciferol (VITAMIN D3) 1,000 units tablet, Take 1,000 Units by mouth daily, Disp: , Rfl:   •  clopidogrel (PLAVIX) 75 mg tablet, TAKE 1 TABLET BY MOUTH DAILY, Disp: 90 tablet, Rfl: 0  •  finasteride (PROSCAR) 5 mg tablet, Take 1 tablet (5 mg total) by mouth daily, Disp: 90 tablet, Rfl: 1  •  furosemide (LASIX) 40 mg tablet, TAKE 1 TABLET BY MOUTH TWICE  DAILY, Disp: 200 tablet, Rfl: 2  •  glucose blood test strip, ReliOn Prime Meter, Disp: , Rfl:   •  Insulin Glargine Solostar (Lantus SoloStar) 100 UNIT/ML SOPN, Inject 0.4 mL (40 Units total) under the skin 2 (two) times a day (Patient taking differently: Inject 60 Units under the skin 2 (two) times a day), Disp: 135 mL, Rfl: 0  •  metoclopramide (REGLAN) 5 mg tablet, Take 1 tablet (5 mg total) by mouth daily, Disp: 90 tablet, Rfl: 1  •  midodrine (PROAMATINE) 2.5 mg tablet, Take 1 tablet (2.5 mg total) by mouth 3 (three) times a day before meals (Patient not taking: Reported on 5/23/2023), Disp: , Rfl: 0  •  pantoprazole (PROTONIX) 40 mg tablet, TAKE 1 TABLET BY MOUTH DAILY IN  THE EARLY MORNING, Disp: 90 tablet, Rfl: 0  •  sodium hypochlorite (DAKIN'S QUARTER-STRENGTH), Irrigate with 1 Application as directed daily, Disp: 473 mL, Rfl: 1  •  tamsulosin (FLOMAX) 0.4 mg, Take 1 capsule (0.4 mg total) by mouth daily with dinner, Disp: 90 capsule, Rfl: 1  Family History   Problem Relation Age of Onset   • Atrial fibrillation Mother    • Stroke Mother    • Hypertension Father    • Heart attack Paternal Grandfather 61 • Diabetes Maternal Grandmother    • Diabetes Maternal Grandfather    • Diabetes Maternal Aunt    • Diabetes Maternal Uncle       Review of Systems  Allergies:  Metformin      Objective:  /70   Pulse 64   Temp 98.2 °F (36.8 °C)   Resp 18     Physical Exam  Vitals reviewed. Constitutional:       Appearance: He is obese. Musculoskeletal:         General: Deformity (left BKA) present. Right lower leg: Edema present. Skin:     Capillary Refill: Capillary refill takes less than 2 seconds. Comments: See wound description. No acute infection but hte plantar fat is dessicated             Wound 07/21/23 Diabetic Ulcer Heel Right (Active)   Enter Jennifer Emiliano score: Jennifer Cummings Grade 1: Partial or full-thickness ulcer (superficial) 08/22/23 0851   Wound Image   08/22/23 0917   Wound Description Lr;Eschar;Black 08/22/23 0851   Anya-wound Assessment Pink; Maceration 08/22/23 0851   Wound Length (cm) 4.7 cm 08/22/23 0851   Wound Width (cm) 4.2 cm 08/22/23 0851   Wound Depth (cm) 0.6 cm 08/22/23 0851   Wound Surface Area (cm^2) 19.74 cm^2 08/22/23 0851   Wound Volume (cm^3) 11.844 cm^3 08/22/23 0851   Calculated Wound Volume (cm^3) 11.84 cm^3 08/22/23 0851   Change in Wound Size % -862.6 08/22/23 0851   Drainage Amount Moderate 08/22/23 0851   Drainage Description Tan 08/22/23 0851   Non-staged Wound Description Full thickness 08/22/23 0851   Dressing Changed Changed 08/07/23 1419   Patient Tolerance Tolerated well 08/07/23 1419   Dressing Status Removed 08/07/23 1419                         Debridement   Wound 07/21/23 Diabetic Ulcer Heel Right    Universal Protocol:  Consent: Verbal consent obtained. Risks and benefits: risks, benefits and alternatives were discussed  Consent given by: patient  Time out: Immediately prior to procedure a "time out" was called to verify the correct patient, procedure, equipment, support staff and site/side marked as required.   Timeout called at: 8/22/2023 9:34 AM.  Patient understanding: patient states understanding of the procedure being performed  Patient identity confirmed: verbally with patient      Performed by: physician  Debridement type: surgical  Level of debridement: subcutaneous tissue  Pain control: lidocaine 4%  Post-debridement measurements  Length (cm): 5  Width (cm): 4.5  Depth (cm): 0.8  Percent debrided: 100%  Surface Area (cm^2): 22.5  Area debrided (cm^2): 22.5  Volume (cm^3): 18    Tissue and other material debrided: adipose, dermis, epidermis and subcutaneous tissue  Devitalized tissue debrided: biofilm, clots, exudate, fibrin, necrotic debris, slough and eschar  Instrument(s) utilized: blade and forceps  Bleeding: small  Hemostasis obtained with: pressure  Procedural pain (0-10): insensate  Post-procedural pain: insensate   Response to treatment: procedure was tolerated well                   Results from last 6 Months   Lab Units 04/17/23  3500   WOUND CULTURE  2+ Growth of - Pantoea septica Pantoea species*           Wound Instructions:  Orders Placed This Encounter   Procedures   • Wound cleansing and dressings      Wound cleansing and dressings             RIGHT HEEL WOUND:  Do not get wound or dressing wet, sponge bath at this time  Apply Dakin's soak for 10 minutes prior to each dressing change  Rinse with normal saline after soak  Apply calcium alginate to wound bed  Cover with ABD  Secure with rolled gauze and paper tape  Change dressing every other day and as needed for excessive drainage  Apply Ace wrap with light to moderate pressure to left foot from base of toes to behind knee    Elevate leg(s) above the level of the heart when sitting or as much as possible.   Keep pressure off right heel wound as much as humanly possible, DO NOT SIT WITH FOOT RESTING ON FLOOR   Limit walking as much as possible and when you do, always wear the offloading shoe    Make sure you are getting 3-4 servings protein per day in diet  Consider low sugar protein shake supplements at least daily]    Continue with VNA for skilled dressing changes every other day until next evaluation next week, skipping days patient is coming to wound center    Follow up Dr. Kasey Way in 1 week    Today's wound treatment note:  Dakin's soak applied as above  Rinsed with saline and redressed as ordered above     Standing Status:   Future     Standing Expiration Date:   8/22/2024   • Debridement     This order was created via procedure documentation   • XR heel / calcaneus 2+ vw right     Standing Status:   Future     Standing Expiration Date:   8/22/2027     Scheduling Instructions:      Bring along any outside films relating to this procedure. Renetta Nguyễn DPM      Portions of the record may have been created with voice recognition software. Occasional wrong word or "sound a like" substitutions may have occurred due to the inherent limitations of voice recognition software. Read the chart carefully and recognize, using context, where substitutions have occurred.

## 2023-08-22 NOTE — PATIENT INSTRUCTIONS
Orders Placed This Encounter   Procedures    Wound cleansing and dressings      Wound cleansing and dressings             RIGHT HEEL WOUND:  Do not get wound or dressing wet, sponge bath at this time  Apply Dakin's soak for 10 minutes prior to each dressing change  Rinse with normal saline after soak  Apply calcium alginate to wound bed  Cover with ABD  Secure with rolled gauze and paper tape  Change dressing every other day and as needed for excessive drainage  Apply Ace wrap with light to moderate pressure to left foot from base of toes to behind knee    Elevate leg(s) above the level of the heart when sitting or as much as possible.   Keep pressure off right heel wound as much as humanly possible, DO NOT SIT WITH FOOT RESTING ON FLOOR   Limit walking as much as possible and when you do, always wear the offloading shoe    Make sure you are getting 3-4 servings protein per day in diet  Consider low sugar protein shake supplements at least daily]    Continue with VNA for skilled dressing changes every other day until next evaluation next week, skipping days patient is coming to wound center    Follow up Dr. Lopez Push in 1 week    Today's wound treatment note:  Dakin's soak applied as above  Rinsed with saline and redressed as ordered above     Standing Status:   Future     Standing Expiration Date:   8/22/2024

## 2023-08-24 ENCOUNTER — HOME CARE VISIT (OUTPATIENT)
Dept: HOME HEALTH SERVICES | Facility: HOME HEALTHCARE | Age: 63
End: 2023-08-24
Payer: COMMERCIAL

## 2023-08-24 VITALS
RESPIRATION RATE: 20 BRPM | DIASTOLIC BLOOD PRESSURE: 74 MMHG | HEART RATE: 80 BPM | OXYGEN SATURATION: 99 % | TEMPERATURE: 98 F | SYSTOLIC BLOOD PRESSURE: 120 MMHG

## 2023-08-24 PROCEDURE — G0300 HHS/HOSPICE OF LPN EA 15 MIN: HCPCS

## 2023-08-26 ENCOUNTER — HOME CARE VISIT (OUTPATIENT)
Dept: HOME HEALTH SERVICES | Facility: HOME HEALTHCARE | Age: 63
End: 2023-08-26
Payer: COMMERCIAL

## 2023-08-26 VITALS
HEART RATE: 104 BPM | TEMPERATURE: 97.7 F | OXYGEN SATURATION: 98 % | RESPIRATION RATE: 20 BRPM | DIASTOLIC BLOOD PRESSURE: 76 MMHG | SYSTOLIC BLOOD PRESSURE: 144 MMHG

## 2023-08-26 PROCEDURE — G0299 HHS/HOSPICE OF RN EA 15 MIN: HCPCS

## 2023-08-28 ENCOUNTER — HOME CARE VISIT (OUTPATIENT)
Dept: HOME HEALTH SERVICES | Facility: HOME HEALTHCARE | Age: 63
End: 2023-08-28
Payer: COMMERCIAL

## 2023-08-28 VITALS
OXYGEN SATURATION: 100 % | HEART RATE: 88 BPM | SYSTOLIC BLOOD PRESSURE: 132 MMHG | RESPIRATION RATE: 20 BRPM | TEMPERATURE: 98.4 F | DIASTOLIC BLOOD PRESSURE: 76 MMHG

## 2023-08-28 PROCEDURE — G0300 HHS/HOSPICE OF LPN EA 15 MIN: HCPCS

## 2023-08-29 ENCOUNTER — OFFICE VISIT (OUTPATIENT)
Dept: WOUND CARE | Facility: CLINIC | Age: 63
End: 2023-08-29
Payer: COMMERCIAL

## 2023-08-29 ENCOUNTER — HOSPITAL ENCOUNTER (OUTPATIENT)
Dept: RADIOLOGY | Facility: HOSPITAL | Age: 63
Discharge: HOME | End: 2023-08-29
Payer: COMMERCIAL

## 2023-08-29 ENCOUNTER — HOME CARE VISIT (OUTPATIENT)
Dept: HOME HEALTH SERVICES | Facility: HOME HEALTHCARE | Age: 63
End: 2023-08-29
Payer: COMMERCIAL

## 2023-08-29 ENCOUNTER — APPOINTMENT (EMERGENCY)
Dept: RADIOLOGY | Facility: HOSPITAL | Age: 63
End: 2023-08-29
Payer: COMMERCIAL

## 2023-08-29 ENCOUNTER — HOSPITAL ENCOUNTER (INPATIENT)
Facility: HOSPITAL | Age: 63
LOS: 4 days | Discharge: NURSING, REHAB OR LONG-TERM CARE | End: 2023-09-02
Attending: EMERGENCY MEDICINE | Admitting: INTERNAL MEDICINE
Payer: COMMERCIAL

## 2023-08-29 VITALS
DIASTOLIC BLOOD PRESSURE: 70 MMHG | SYSTOLIC BLOOD PRESSURE: 154 MMHG | RESPIRATION RATE: 18 BRPM | HEART RATE: 66 BPM | TEMPERATURE: 97.7 F

## 2023-08-29 DIAGNOSIS — R33.9 URINARY RETENTION: ICD-10-CM

## 2023-08-29 DIAGNOSIS — S92.014B: Primary | ICD-10-CM

## 2023-08-29 DIAGNOSIS — E11.42 DIABETIC POLYNEUROPATHY ASSOCIATED WITH TYPE 2 DIABETES MELLITUS (HCC): ICD-10-CM

## 2023-08-29 DIAGNOSIS — L97.412 DIABETIC ULCER OF RIGHT HEEL ASSOCIATED WITH TYPE 2 DIABETES MELLITUS, WITH FAT LAYER EXPOSED (HCC): ICD-10-CM

## 2023-08-29 DIAGNOSIS — L97.414 DIABETIC ULCER OF RIGHT HEEL ASSOCIATED WITH TYPE 2 DIABETES MELLITUS, WITH NECROSIS OF BONE (HCC): ICD-10-CM

## 2023-08-29 DIAGNOSIS — E11.621 DIABETIC ULCER OF RIGHT HEEL ASSOCIATED WITH TYPE 2 DIABETES MELLITUS, WITH FAT LAYER EXPOSED (HCC): ICD-10-CM

## 2023-08-29 DIAGNOSIS — E11.40 TYPE 2 DIABETES MELLITUS WITH DIABETIC NEUROPATHY, WITH LONG-TERM CURRENT USE OF INSULIN (HCC): ICD-10-CM

## 2023-08-29 DIAGNOSIS — E11.621 DIABETIC ULCER OF RIGHT HEEL ASSOCIATED WITH TYPE 2 DIABETES MELLITUS, WITH NECROSIS OF BONE (HCC): ICD-10-CM

## 2023-08-29 DIAGNOSIS — K59.00 CONSTIPATION: ICD-10-CM

## 2023-08-29 DIAGNOSIS — Z79.4 TYPE 2 DIABETES MELLITUS WITH DIABETIC NEUROPATHY, WITH LONG-TERM CURRENT USE OF INSULIN (HCC): ICD-10-CM

## 2023-08-29 DIAGNOSIS — E11.621 DIABETIC ULCER OF RIGHT HEEL ASSOCIATED WITH TYPE 2 DIABETES MELLITUS, WITH FAT LAYER EXPOSED (HCC): Primary | ICD-10-CM

## 2023-08-29 DIAGNOSIS — L97.412 DIABETIC ULCER OF RIGHT HEEL ASSOCIATED WITH TYPE 2 DIABETES MELLITUS, WITH FAT LAYER EXPOSED (HCC): Primary | ICD-10-CM

## 2023-08-29 DIAGNOSIS — M86.171 OSTEOMYELITIS OF ANKLE OR FOOT, ACUTE, RIGHT (HCC): ICD-10-CM

## 2023-08-29 PROBLEM — L97.419 DIABETIC ULCER OF RIGHT HEEL (HCC): Status: ACTIVE | Noted: 2023-08-29

## 2023-08-29 LAB
ALBUMIN SERPL BCP-MCNC: 3.6 G/DL (ref 3.5–5)
ALP SERPL-CCNC: 49 U/L (ref 34–104)
ALT SERPL W P-5'-P-CCNC: 9 U/L (ref 7–52)
ANION GAP SERPL CALCULATED.3IONS-SCNC: 7 MMOL/L
APTT PPP: 29 SECONDS (ref 23–37)
AST SERPL W P-5'-P-CCNC: 12 U/L (ref 13–39)
BASOPHILS # BLD AUTO: 0.07 THOUSANDS/ÂΜL (ref 0–0.1)
BASOPHILS NFR BLD AUTO: 1 % (ref 0–1)
BILIRUB SERPL-MCNC: 0.41 MG/DL (ref 0.2–1)
BUN SERPL-MCNC: 19 MG/DL (ref 5–25)
CALCIUM SERPL-MCNC: 9.4 MG/DL (ref 8.4–10.2)
CHLORIDE SERPL-SCNC: 97 MMOL/L (ref 96–108)
CO2 SERPL-SCNC: 31 MMOL/L (ref 21–32)
CREAT SERPL-MCNC: 1.03 MG/DL (ref 0.6–1.3)
EOSINOPHIL # BLD AUTO: 0.22 THOUSAND/ÂΜL (ref 0–0.61)
EOSINOPHIL NFR BLD AUTO: 2 % (ref 0–6)
ERYTHROCYTE [DISTWIDTH] IN BLOOD BY AUTOMATED COUNT: 13.4 % (ref 11.6–15.1)
GFR SERPL CREATININE-BSD FRML MDRD: 76 ML/MIN/1.73SQ M
GLUCOSE SERPL-MCNC: 117 MG/DL (ref 65–140)
GLUCOSE SERPL-MCNC: 137 MG/DL (ref 65–140)
GLUCOSE SERPL-MCNC: 67 MG/DL (ref 65–140)
GLUCOSE SERPL-MCNC: 77 MG/DL (ref 65–140)
HCT VFR BLD AUTO: 42.8 % (ref 36.5–49.3)
HGB BLD-MCNC: 13.2 G/DL (ref 12–17)
IMM GRANULOCYTES # BLD AUTO: 0.03 THOUSAND/UL (ref 0–0.2)
IMM GRANULOCYTES NFR BLD AUTO: 0 % (ref 0–2)
INR PPP: 1.08 (ref 0.84–1.19)
LACTATE SERPL-SCNC: 1.4 MMOL/L (ref 0.5–2)
LYMPHOCYTES # BLD AUTO: 1.82 THOUSANDS/ÂΜL (ref 0.6–4.47)
LYMPHOCYTES NFR BLD AUTO: 17 % (ref 14–44)
MAGNESIUM SERPL-MCNC: 1.8 MG/DL (ref 1.9–2.7)
MCH RBC QN AUTO: 26.5 PG (ref 26.8–34.3)
MCHC RBC AUTO-ENTMCNC: 30.8 G/DL (ref 31.4–37.4)
MCV RBC AUTO: 86 FL (ref 82–98)
MONOCYTES # BLD AUTO: 0.54 THOUSAND/ÂΜL (ref 0.17–1.22)
MONOCYTES NFR BLD AUTO: 5 % (ref 4–12)
NEUTROPHILS # BLD AUTO: 7.86 THOUSANDS/ÂΜL (ref 1.85–7.62)
NEUTS SEG NFR BLD AUTO: 75 % (ref 43–75)
NRBC BLD AUTO-RTO: 0 /100 WBCS
PLATELET # BLD AUTO: 363 THOUSANDS/UL (ref 149–390)
PMV BLD AUTO: 8.9 FL (ref 8.9–12.7)
POTASSIUM SERPL-SCNC: 4.2 MMOL/L (ref 3.5–5.3)
PROT SERPL-MCNC: 8.2 G/DL (ref 6.4–8.4)
PROTHROMBIN TIME: 14 SECONDS (ref 11.6–14.5)
RBC # BLD AUTO: 4.98 MILLION/UL (ref 3.88–5.62)
SODIUM SERPL-SCNC: 135 MMOL/L (ref 135–147)
WBC # BLD AUTO: 10.54 THOUSAND/UL (ref 4.31–10.16)

## 2023-08-29 PROCEDURE — 85025 COMPLETE CBC W/AUTO DIFF WBC: CPT | Performed by: EMERGENCY MEDICINE

## 2023-08-29 PROCEDURE — 87185 SC STD ENZYME DETCJ PER NZM: CPT | Performed by: EMERGENCY MEDICINE

## 2023-08-29 PROCEDURE — 87040 BLOOD CULTURE FOR BACTERIA: CPT | Performed by: EMERGENCY MEDICINE

## 2023-08-29 PROCEDURE — 85730 THROMBOPLASTIN TIME PARTIAL: CPT | Performed by: EMERGENCY MEDICINE

## 2023-08-29 PROCEDURE — 80053 COMPREHEN METABOLIC PANEL: CPT | Performed by: EMERGENCY MEDICINE

## 2023-08-29 PROCEDURE — 99285 EMERGENCY DEPT VISIT HI MDM: CPT | Performed by: EMERGENCY MEDICINE

## 2023-08-29 PROCEDURE — 87076 CULTURE ANAEROBE IDENT EACH: CPT | Performed by: EMERGENCY MEDICINE

## 2023-08-29 PROCEDURE — 99213 OFFICE O/P EST LOW 20 MIN: CPT | Performed by: PODIATRIST

## 2023-08-29 PROCEDURE — 87205 SMEAR GRAM STAIN: CPT | Performed by: EMERGENCY MEDICINE

## 2023-08-29 PROCEDURE — 87070 CULTURE OTHR SPECIMN AEROBIC: CPT | Performed by: EMERGENCY MEDICINE

## 2023-08-29 PROCEDURE — 87077 CULTURE AEROBIC IDENTIFY: CPT | Performed by: EMERGENCY MEDICINE

## 2023-08-29 PROCEDURE — 87154 CUL TYP ID BLD PTHGN 6+ TRGT: CPT | Performed by: EMERGENCY MEDICINE

## 2023-08-29 PROCEDURE — 87186 SC STD MICRODIL/AGAR DIL: CPT | Performed by: EMERGENCY MEDICINE

## 2023-08-29 PROCEDURE — 36415 COLL VENOUS BLD VENIPUNCTURE: CPT | Performed by: EMERGENCY MEDICINE

## 2023-08-29 PROCEDURE — 73630 X-RAY EXAM OF FOOT: CPT

## 2023-08-29 PROCEDURE — 11042 DBRDMT SUBQ TIS 1ST 20SQCM/<: CPT | Performed by: PODIATRIST

## 2023-08-29 PROCEDURE — 82948 REAGENT STRIP/BLOOD GLUCOSE: CPT

## 2023-08-29 PROCEDURE — 87075 CULTR BACTERIA EXCEPT BLOOD: CPT | Performed by: EMERGENCY MEDICINE

## 2023-08-29 PROCEDURE — NC001 PR NO CHARGE: Performed by: PODIATRIST

## 2023-08-29 PROCEDURE — 85610 PROTHROMBIN TIME: CPT | Performed by: EMERGENCY MEDICINE

## 2023-08-29 PROCEDURE — 83735 ASSAY OF MAGNESIUM: CPT | Performed by: EMERGENCY MEDICINE

## 2023-08-29 PROCEDURE — 73650 X-RAY EXAM OF HEEL: CPT

## 2023-08-29 PROCEDURE — 83605 ASSAY OF LACTIC ACID: CPT | Performed by: EMERGENCY MEDICINE

## 2023-08-29 PROCEDURE — 99223 1ST HOSP IP/OBS HIGH 75: CPT | Performed by: PODIATRIST

## 2023-08-29 PROCEDURE — 99223 1ST HOSP IP/OBS HIGH 75: CPT | Performed by: INTERNAL MEDICINE

## 2023-08-29 PROCEDURE — 99285 EMERGENCY DEPT VISIT HI MDM: CPT

## 2023-08-29 RX ORDER — POLYETHYLENE GLYCOL 3350 17 G/17G
17 POWDER, FOR SOLUTION ORAL DAILY
Status: DISCONTINUED | OUTPATIENT
Start: 2023-08-29 | End: 2023-09-02 | Stop reason: HOSPADM

## 2023-08-29 RX ORDER — INSULIN GLARGINE 100 [IU]/ML
40 INJECTION, SOLUTION SUBCUTANEOUS EVERY 12 HOURS SCHEDULED
Status: DISCONTINUED | OUTPATIENT
Start: 2023-08-29 | End: 2023-09-02 | Stop reason: HOSPADM

## 2023-08-29 RX ORDER — CLOPIDOGREL BISULFATE 75 MG/1
75 TABLET ORAL DAILY
Status: DISCONTINUED | OUTPATIENT
Start: 2023-08-29 | End: 2023-09-02 | Stop reason: HOSPADM

## 2023-08-29 RX ORDER — ATORVASTATIN CALCIUM 40 MG/1
80 TABLET, FILM COATED ORAL
Status: DISCONTINUED | OUTPATIENT
Start: 2023-08-29 | End: 2023-09-02 | Stop reason: HOSPADM

## 2023-08-29 RX ORDER — MIDODRINE HYDROCHLORIDE 5 MG/1
2.5 TABLET ORAL
Status: DISCONTINUED | OUTPATIENT
Start: 2023-08-29 | End: 2023-09-02 | Stop reason: HOSPADM

## 2023-08-29 RX ORDER — HEPARIN SODIUM 5000 [USP'U]/ML
5000 INJECTION, SOLUTION INTRAVENOUS; SUBCUTANEOUS EVERY 8 HOURS SCHEDULED
Status: DISCONTINUED | OUTPATIENT
Start: 2023-08-29 | End: 2023-09-02 | Stop reason: HOSPADM

## 2023-08-29 RX ORDER — LANOLIN ALCOHOL/MO/W.PET/CERES
3 CREAM (GRAM) TOPICAL
Status: DISCONTINUED | OUTPATIENT
Start: 2023-08-29 | End: 2023-09-02 | Stop reason: HOSPADM

## 2023-08-29 RX ORDER — ONDANSETRON 2 MG/ML
4 INJECTION INTRAMUSCULAR; INTRAVENOUS EVERY 6 HOURS PRN
Status: DISCONTINUED | OUTPATIENT
Start: 2023-08-29 | End: 2023-09-02 | Stop reason: HOSPADM

## 2023-08-29 RX ORDER — TAMSULOSIN HYDROCHLORIDE 0.4 MG/1
0.4 CAPSULE ORAL
Status: DISCONTINUED | OUTPATIENT
Start: 2023-08-29 | End: 2023-09-02 | Stop reason: HOSPADM

## 2023-08-29 RX ORDER — METOCLOPRAMIDE 5 MG/1
5 TABLET ORAL DAILY
Status: DISCONTINUED | OUTPATIENT
Start: 2023-08-29 | End: 2023-09-02 | Stop reason: HOSPADM

## 2023-08-29 RX ORDER — METRONIDAZOLE 500 MG/100ML
500 INJECTION, SOLUTION INTRAVENOUS EVERY 8 HOURS
Status: DISCONTINUED | OUTPATIENT
Start: 2023-08-29 | End: 2023-09-01

## 2023-08-29 RX ORDER — FERROUS SULFATE 325(65) MG
TABLET ORAL
COMMUNITY
End: 2023-09-07

## 2023-08-29 RX ORDER — INSULIN LISPRO 100 [IU]/ML
1-6 INJECTION, SOLUTION INTRAVENOUS; SUBCUTANEOUS
Status: DISCONTINUED | OUTPATIENT
Start: 2023-08-29 | End: 2023-09-02 | Stop reason: HOSPADM

## 2023-08-29 RX ORDER — FUROSEMIDE 40 MG/1
40 TABLET ORAL
Status: DISCONTINUED | OUTPATIENT
Start: 2023-08-29 | End: 2023-09-02 | Stop reason: HOSPADM

## 2023-08-29 RX ORDER — DEXTROSE MONOHYDRATE 25 G/50ML
50 INJECTION, SOLUTION INTRAVENOUS ONCE
Status: COMPLETED | OUTPATIENT
Start: 2023-08-29 | End: 2023-08-29

## 2023-08-29 RX ORDER — DOCUSATE SODIUM 100 MG/1
100 CAPSULE, LIQUID FILLED ORAL 2 TIMES DAILY
Status: DISCONTINUED | OUTPATIENT
Start: 2023-08-29 | End: 2023-09-02 | Stop reason: HOSPADM

## 2023-08-29 RX ORDER — ACETAMINOPHEN 325 MG/1
650 TABLET ORAL EVERY 6 HOURS PRN
Status: DISCONTINUED | OUTPATIENT
Start: 2023-08-29 | End: 2023-09-02 | Stop reason: HOSPADM

## 2023-08-29 RX ORDER — MIDODRINE HYDROCHLORIDE 5 MG/1
2.5 TABLET ORAL
Status: DISCONTINUED | OUTPATIENT
Start: 2023-08-30 | End: 2023-08-29

## 2023-08-29 RX ORDER — CALCIUM CARBONATE 500 MG/1
1000 TABLET, CHEWABLE ORAL DAILY PRN
Status: DISCONTINUED | OUTPATIENT
Start: 2023-08-29 | End: 2023-09-02 | Stop reason: HOSPADM

## 2023-08-29 RX ORDER — FINASTERIDE 5 MG/1
5 TABLET, FILM COATED ORAL
Status: DISCONTINUED | OUTPATIENT
Start: 2023-08-29 | End: 2023-09-02 | Stop reason: HOSPADM

## 2023-08-29 RX ORDER — PANTOPRAZOLE SODIUM 40 MG/1
40 TABLET, DELAYED RELEASE ORAL
Status: DISCONTINUED | OUTPATIENT
Start: 2023-08-30 | End: 2023-09-02 | Stop reason: HOSPADM

## 2023-08-29 RX ORDER — INSULIN LISPRO 100 [IU]/ML
1-5 INJECTION, SOLUTION INTRAVENOUS; SUBCUTANEOUS
Status: DISCONTINUED | OUTPATIENT
Start: 2023-08-29 | End: 2023-09-02 | Stop reason: HOSPADM

## 2023-08-29 RX ORDER — CEFAZOLIN SODIUM 2 G/50ML
2000 SOLUTION INTRAVENOUS EVERY 8 HOURS
Status: DISCONTINUED | OUTPATIENT
Start: 2023-08-29 | End: 2023-09-01

## 2023-08-29 RX ADMIN — ATORVASTATIN CALCIUM 80 MG: 40 TABLET, FILM COATED ORAL at 16:13

## 2023-08-29 RX ADMIN — HEPARIN SODIUM 5000 UNITS: 5000 INJECTION INTRAVENOUS; SUBCUTANEOUS at 22:07

## 2023-08-29 RX ADMIN — CLOPIDOGREL BISULFATE 75 MG: 75 TABLET ORAL at 15:12

## 2023-08-29 RX ADMIN — CEFAZOLIN SODIUM 2000 MG: 2 SOLUTION INTRAVENOUS at 15:13

## 2023-08-29 RX ADMIN — CEFAZOLIN SODIUM 2000 MG: 2 SOLUTION INTRAVENOUS at 22:59

## 2023-08-29 RX ADMIN — ASPIRIN 81 MG: 81 TABLET, COATED ORAL at 15:12

## 2023-08-29 RX ADMIN — MIDODRINE HYDROCHLORIDE 2.5 MG: 5 TABLET ORAL at 20:21

## 2023-08-29 RX ADMIN — HEPARIN SODIUM 5000 UNITS: 5000 INJECTION INTRAVENOUS; SUBCUTANEOUS at 15:12

## 2023-08-29 RX ADMIN — DEXTROSE MONOHYDRATE 50 ML: 25 INJECTION, SOLUTION INTRAVENOUS at 14:36

## 2023-08-29 RX ADMIN — FUROSEMIDE 40 MG: 40 TABLET ORAL at 16:13

## 2023-08-29 RX ADMIN — METRONIDAZOLE 500 MG: 500 INJECTION, SOLUTION INTRAVENOUS at 15:44

## 2023-08-29 RX ADMIN — FINASTERIDE 5 MG: 5 TABLET, FILM COATED ORAL at 16:13

## 2023-08-29 RX ADMIN — METOCLOPRAMIDE 5 MG: 5 TABLET ORAL at 15:00

## 2023-08-29 RX ADMIN — TAMSULOSIN HYDROCHLORIDE 0.4 MG: 0.4 CAPSULE ORAL at 16:13

## 2023-08-29 RX ADMIN — METRONIDAZOLE 500 MG: 500 INJECTION, SOLUTION INTRAVENOUS at 22:08

## 2023-08-29 RX ADMIN — INSULIN GLARGINE 40 UNITS: 100 INJECTION, SOLUTION SUBCUTANEOUS at 22:07

## 2023-08-29 NOTE — ACP (ADVANCE CARE PLANNING)
Advanced Care Planning Progress Note    Serious Illness Conversation    1. What is your understanding now of where you are with your illness? Prognostic Understanding: appropriate understanding of prognosis  Pt notes he is concerned that his R heel ulcer is worsening. He feels his body is "deteriorating". He notes he is predominantly in his recliner all day and night. Notes his quality of life has declined over the past years     2. How much information about what is likely to be ahead with your illness would you like to have? 3. What did you (clinician) communicate to the patient? 4. If your health situation worsens, what are your most important goals? Pt notes he wants to get better and wishes to do everything possible to help heal his R heel ulcer. He is agreeable to debriedments, antibiotics, and STR stay. 5. What are the biggest fears and worries about the future and your health? 6. What abilities are so critical to your life that you cannot imagine living without them? Would not want to be on life support if he was not going to have any meaningful quality of life once he was off it     7. What gives you strength as you think about the future with your illness? Good support from wife who manages his medications and appts     8. If you become sicker, how much are you willing to go through for the possibility of gaining more time? Be in the hospital: Yes    Be in the ICU: Yes    Be on a ventilator: Yes    Pt notes he is considering changing his code status as he feels his quality of life has declined and his health has declined. He notes he would not want resuscitation if it were to be a prolonged resuscitation. He would not want to be kept alive on life support if he was not going to have any meaningful chance of recovery or good quality of life. He has been a full code on all prior admissions, but is thinking through if he wishes to change his code status to DNR/DNI.   He requests to remain full code for now, until he is able to finalize he wishes. His wife next to him notes she supports him, and this is a decision only he can make for himself. 9. How much does your proxy and family know about your priorities and wishes? Wife very supportive and involved. Helps organize his medical information, med lists and placed his meds in pill box for him. How does this plan sound to you? I will do everything I can to help you through this.         Advanced directives         Stacy Jorge MD

## 2023-08-29 NOTE — ASSESSMENT & PLAN NOTE
· He notes he had orthostatic hypotension but supine hypertension   · He was prescribed midodrine 2.5 mg tid ac meals, but stated however since he is no longer ambulating or upright he stopped taking it  · Per RN-  When pt used commode his sbp dropped to 60's:   Will restart home midodrine  · Caution with standing and advised slow movements

## 2023-08-29 NOTE — ED PROVIDER NOTES
History  Chief Complaint   Patient presents with   • Wound Check     Pt sent by podiatry for admission, has significant wound noted to R foot. Patient was told to go to the ED by his podiatrist earlier today due to a worsening right heel wound. He has a history of Left BKA. He struggles with lymphedema and diabetic neuropathy and had multiple right lower extremity wounds several months ago. Those wounds had healed, but he developed a new heel wound about 2 months ago. He says the wound has been worsening since then. Xrays were taken in the podiatry office today concerning for possible pathologic calcaneus fracture related to osteomyelitis, however there was question in the radiology read if the lines seen were artifact from bandages/soft tissue. He denies nausea, vomiting, chills, shortness of breath, chest pain. He says he has visiting nurses come to his house twice a week for dressing changes and sees Dr. Taiwo Dominguez once a week in the wound center. He says he has difficulty staying off the foot at home. Prior to Admission Medications   Prescriptions Last Dose Informant Patient Reported? Taking?    Insulin Glargine Solostar (Lantus SoloStar) 100 UNIT/ML SOPN   No No   Sig: Inject 0.4 mL (40 Units total) under the skin 2 (two) times a day   Patient taking differently: Inject 60 Units under the skin 2 (two) times a day   ammonium lactate (LAC-HYDRIN) 12 % lotion   No No   Sig: Apply 1 Application topically in the morning   aspirin (ECOTRIN LOW STRENGTH) 81 mg EC tablet   Yes No   Sig: Take 81 mg by mouth daily   Patient not taking: Reported on 5/23/2023   atorvastatin (LIPITOR) 80 mg tablet   No Yes   Sig: Take 1 tablet (80 mg total) by mouth daily   cholecalciferol (VITAMIN D3) 1,000 units tablet   Yes Yes   Sig: Take 1,000 Units by mouth daily   clopidogrel (PLAVIX) 75 mg tablet   No Yes   Sig: TAKE 1 TABLET BY MOUTH DAILY   ferrous sulfate 325 (65 Fe) mg tablet   Yes Yes   Sig: Take 1 tablet every day by oral route.    finasteride (PROSCAR) 5 mg tablet   No Yes   Sig: Take 1 tablet (5 mg total) by mouth daily   furosemide (LASIX) 40 mg tablet   No Yes   Sig: TAKE 1 TABLET BY MOUTH TWICE  DAILY   glucose blood test strip  Spouse/Significant Other Yes No   Sig: ReliOn Prime Meter   metoclopramide (REGLAN) 5 mg tablet   No Yes   Sig: Take 1 tablet (5 mg total) by mouth daily   midodrine (PROAMATINE) 2.5 mg tablet   No No   Sig: Take 1 tablet (2.5 mg total) by mouth 3 (three) times a day before meals   Patient not taking: Reported on 5/23/2023   pantoprazole (PROTONIX) 40 mg tablet   No Yes   Sig: TAKE 1 TABLET BY MOUTH DAILY IN  THE EARLY MORNING   sodium hypochlorite (DAKIN'S QUARTER-STRENGTH)   No Yes   Sig: Irrigate with 1 Application as directed daily   tamsulosin (FLOMAX) 0.4 mg   No No   Sig: Take 1 capsule (0.4 mg total) by mouth daily with dinner      Facility-Administered Medications: None       Past Medical History:   Diagnosis Date   • Anxiety    • Anxiety and depression    • Cataract    • Congestive cardiac failure (HCC)    • Coronary artery disease    • Depression    • Diabetes mellitus ([address])    • Diabetic autonomic neuropathy associated with type 2 diabetes mellitus ([address])     Last Assessed: 12/28/2017   • History of DVT (deep vein thrombosis)     left LE   • Hyperlipidemia    • Lymphedema of right lower extremity    • Obesity    • Orthostatic hypotension        Past Surgical History:   Procedure Laterality Date   • CORONARY ARTERY BYPASS GRAFT     • EYE SURGERY      cataracts bilateral   • LEG AMPUTATION THROUGH LOWER TIBIA AND FIBULA Left 8/3/2018    Procedure: AMPUTATION BELOW KNEE (BKA) L BKA;  Surgeon: Albina Uribe MD;  Location: [facility];  Service: Vascular   • MD CORONARY ARTERY BYP W/VEIN & ARTERY GRAFT 4 VEIN N/A 3/28/2018    Procedure: CORONARY ARTERY BYPASS GRAFT (CABG) x3 VESSELS, LIMA TO LAD, SVG--> PDA, SVG--> OM2,  RIGHT LEG EVH/SVH TO , INTRA-OP AMANDEEP;  Surgeon: Jailyn Urbano Cecelia Aleman MD;  Location: [facility];  Service: Cardiac Surgery   • ROTATOR CUFF REPAIR Bilateral        Family History   Problem Relation Age of Onset   • Atrial fibrillation Mother    • Stroke Mother    • Hypertension Father    • Heart attack Paternal Grandfather 61   • Diabetes Maternal Grandmother    • Diabetes Maternal Grandfather    • Diabetes Maternal Aunt    • Diabetes Maternal Uncle      I have reviewed and agree with the history as documented. E-Cigarette/Vaping   • E-Cigarette Use Never User      E-Cigarette/Vaping Substances   • Nicotine No    • THC No    • CBD No    • Flavoring No    • Other No    • Unknown No      Social History     Tobacco Use   • Smoking status: Former     Packs/day: 1.00     Years: 12.00     Total pack years: 12.00     Types: Cigarettes     Quit date: 3/23/1994     Years since quittin.4   • Smokeless tobacco: Never   Vaping Use   • Vaping Use: Never used   Substance Use Topics   • Alcohol use: No     Comment: rarely   • Drug use: No        Review of Systems   Constitutional: Negative for activity change, chills, diaphoresis, fatigue and fever. HENT: Negative. Eyes: Negative. Respiratory: Negative for cough, chest tightness and shortness of breath. Cardiovascular: Negative. Gastrointestinal: Negative. Endocrine: Negative. Genitourinary: Negative. Musculoskeletal: Positive for gait problem. Negative for myalgias. Skin: Positive for wound. Neurological: Negative for dizziness, seizures, syncope and light-headedness. Psychiatric/Behavioral: Negative.         Physical Exam  ED Triage Vitals [23 1049]   Temperature Pulse Respirations Blood Pressure SpO2   98.1 °F (36.7 °C) 75 20 114/85 98 %      Temp Source Heart Rate Source Patient Position - Orthostatic VS BP Location FiO2 (%)   Oral Monitor Sitting Right arm --      Pain Score       --             Orthostatic Vital Signs  Vitals:    23 1049   BP: 114/85   Pulse: 75   Patient Position - Orthostatic VS: Sitting       Physical Exam  Vitals reviewed. Constitutional:       General: He is not in acute distress. Appearance: He is obese. He is not ill-appearing, toxic-appearing or diaphoretic. HENT:      Head: Atraumatic. Mouth/Throat:      Mouth: Mucous membranes are moist.   Eyes:      Pupils: Pupils are equal, round, and reactive to light. Cardiovascular:      Rate and Rhythm: Normal rate. Pulmonary:      Effort: Pulmonary effort is normal. No respiratory distress. Abdominal:      General: There is no distension. Musculoskeletal:      Cervical back: No rigidity. Left lower leg: Edema present. Feet:       Comments: Left BKA w/prosthetic   Feet:      Comments: Right lower extremity:  Right heel wound. Right lower extremity lymphedema. Right lower leg erythema vs chronic lymphedema skin changes. Skin:     General: Skin is warm and dry. Capillary Refill: Capillary refill takes 2 to 3 seconds. Neurological:      Mental Status: He is alert and oriented to person, place, and time. Mental status is at baseline. Comments: Right lower extremity peripheral neuropathy   Psychiatric:         Mood and Affect: Mood normal.         Behavior: Behavior normal.         ED Medications  Medications - No data to display    Diagnostic Studies  Results Reviewed     Procedure Component Value Units Date/Time    Lactic acid, plasma (w/reflex if result > 2.0) [[id number]]  (Normal) Collected: 08/29/23 1210    Lab Status: Final result Specimen: Blood from Arm, Left Updated: 08/29/23 1244     LACTIC ACID 1.4 mmol/L     Narrative:      Result may be elevated if tourniquet was used during collection.     Comprehensive metabolic panel [[id number]]  (Abnormal) Collected: 08/29/23 1210    Lab Status: Final result Specimen: Blood from Arm, Left Updated: 08/29/23 1243     Sodium 135 mmol/L      Potassium 4.2 mmol/L      Chloride 97 mmol/L      CO2 31 mmol/L      ANION GAP 7 mmol/L      BUN 19 mg/dL      Creatinine 1.03 mg/dL      Glucose 77 mg/dL      Calcium 9.4 mg/dL      AST 12 U/L      ALT 9 U/L      Alkaline Phosphatase 49 U/L      Total Protein 8.2 g/dL      Albumin 3.6 g/dL      Total Bilirubin 0.41 mg/dL      eGFR 76 ml/min/1.73sq m     Narrative:      [facility] guidelines for Chronic Kidney Disease (CKD):   •  Stage 1 with normal or high GFR (GFR > 90 mL/min/1.73 square meters)  •  Stage 2 Mild CKD (GFR = 60-89 mL/min/1.73 square meters)  •  Stage 3A Moderate CKD (GFR = 45-59 mL/min/1.73 square meters)  •  Stage 3B Moderate CKD (GFR = 30-44 mL/min/1.73 square meters)  •  Stage 4 Severe CKD (GFR = 15-29 mL/min/1.73 square meters)  •  Stage 5 End Stage CKD (GFR <15 mL/min/1.73 square meters)  Note: GFR calculation is accurate only with a steady state creatinine    Magnesium [[id number]]  (Abnormal) Collected: 08/29/23 1210    Lab Status: Final result Specimen: Blood from Arm, Left Updated: 08/29/23 1243     Magnesium 1.8 mg/dL     Protime-INR [[id number]]  (Normal) Collected: 08/29/23 1210    Lab Status: Final result Specimen: Blood from Arm, Left Updated: 08/29/23 1236     Protime 14.0 seconds      INR 1.08    APTT [[id number]]  (Normal) Collected: 08/29/23 1210    Lab Status: Final result Specimen: Blood from Arm, Left Updated: 08/29/23 1236     PTT 29 seconds     CBC and differential [[id number]]  (Abnormal) Collected: 08/29/23 1210    Lab Status: Final result Specimen: Blood from Arm, Left Updated: 08/29/23 1226     WBC 10.54 Thousand/uL      RBC 4.98 Million/uL      Hemoglobin 13.2 g/dL      Hematocrit 42.8 %      MCV 86 fL      MCH 26.5 pg      MCHC 30.8 g/dL      RDW 13.4 %      MPV 8.9 fL      Platelets 686 Thousands/uL      nRBC 0 /100 WBCs      Neutrophils Relative 75 %      Immat GRANS % 0 %      Lymphocytes Relative 17 %      Monocytes Relative 5 %      Eosinophils Relative 2 %      Basophils Relative 1 %      Neutrophils Absolute 7.86 Thousands/µL      Immature Grans Absolute 0.03 Thousand/uL      Lymphocytes Absolute 1.82 Thousands/µL      Monocytes Absolute 0.54 Thousand/µL      Eosinophils Absolute 0.22 Thousand/µL      Basophils Absolute 0.07 Thousands/µL     Blood culture #2 [[id number]] Collected: 08/29/23 1210    Lab Status: In process Specimen: Blood from Arm, Left Updated: 08/29/23 1219    Anaerobic culture and Gram stain [[id number]] Collected: 08/29/23 1130    Lab Status: In process Specimen: Wound Updated: 08/29/23 1219    Wound culture and Gram stain [[id number]] Collected: 08/29/23 1130    Lab Status: In process Specimen: Wound from Foot, Right Updated: 08/29/23 1219    Blood culture #1 [[id number]] Collected: 08/29/23 1208    Lab Status: In process Specimen: Blood from Arm, Right Updated: 08/29/23 1211                 XR foot 3+ views RIGHT   ED Interpretation by Polly Traylor DPM (08/29 1303)   Wet read: I personally reviewed the xrays. There is no sign of acute osseous abnormality seen. The radiolucent lines that were suspicious for calcaneal fracture on outpatient xrays today are more clearly seen to be consistent with soft tissue creases/density variations. Follow up final read            Procedures  Procedures      ED Course  ED Course as of 08/29/23 1345   Tue Aug 29, 2023   1303 XR foot 3+ views RIGHT  Reviewed. See ED wet read. No signs of acute osseous abnormality or infectious process. 1339 Comprehensive metabolic panel(!)  Essentially normal electrolytes, renal function, and LFT   1340 CBC and differential(!)  Mildly elevated WBC, without clinically correlated symptoms of infection. His prior CBCs were reviewed and his baseline WBC appears elevated. 1341 Lactic acid, plasma (w/reflex if result > 2.0)  Lactic acid reviewed, WNL   1342 Magnesium(!)  Reviewed, 1.8. No need for intervention                             SBIRT 20yo+    Flowsheet Row Most Recent Value   Initial Alcohol Screen: US AUDIT-C     1.  How often do you have a drink containing alcohol? 0 Filed at: 08/29/2023 1105   2. How many drinks containing alcohol do you have on a typical day you are drinking? 0 Filed at: 08/29/2023 1105   3a. Male UNDER 65: How often do you have five or more drinks on one occasion? 0 Filed at: 08/29/2023 1105   Audit-C Score 0 Filed at: 08/29/2023 1105   TYRON: How many times in the past year have you. .. Used an illegal drug or used a prescription medication for non-medical reasons? Never Filed at: 08/29/2023 1105                Medical Decision Making  Patient presented with a worsening right heel wound from the outpatient podiatry office and outpatient xrays concerning for possible pathological calcaneal fracture. Based on clinical exam and history it was decided to check repeat foot xrays and to consult Podiatry. Labs and vitals were not concerning for sepsis. Podiatry took wound cultures at bedside. Discussed with Podiatry and they did not feel antibiotics needed to be started at this time. Podiatry recommended admission for MRI evaluation and due to ambulatory dysfunction for rehab placement. [facility] was contacted for admission. Amount and/or Complexity of Data Reviewed  External Data Reviewed: radiology and notes. Details: Outpatient right calcaneal xray from earlier today 8/29/23 and the radiology read were reviewed. Dr. Anna Renteria outpatient office note from earlier today 8/29/23 was reviewed. Labs: ordered. Decision-making details documented in ED Course. Radiology: ordered and independent interpretation performed. Decision-making details documented in ED Course. Risk  Decision regarding hospitalization.             Disposition  Final diagnoses:   Diabetic ulcer of right heel associated with type 2 diabetes mellitus, with fat layer exposed ([address])     Time reflects when diagnosis was documented in both MDM as applicable and the Disposition within this note     Time User Action Codes Description Comment    8/29/2023 11:57 AM Aldo Tomlinson Add [Y89.431,  L97.412] Diabetic ulcer of right heel associated with type 2 diabetes mellitus, with fat layer exposed [facility])       ED Disposition     ED Disposition   Admit    Condition   Stable    Date/Time   Tue Aug 29, 2023 12:59 PM    Comment   Case was discussed with JACLYN and the patient's admission status was agreed to be Admission Status: inpatient status to the service of Dr. Micha Resendiz . Follow-up Information     Follow up With Specialties Details Why Contact Info Additional Information    [address] [facility]ound [facility] Wound Care Call in 1 week(s)  [address] [phone] [address] Call in 1 week(s)  [address] [facility]  [id number]  [address] [place] [id number] [phone]          Patient's Medications   Discharge Prescriptions    No medications on file     No discharge procedures on file. PDMP Review       Value Time User    PDMP Reviewed  Yes 4/18/2023  1:02 AM Shalini Helton PA-C           ED Provider  Attending physically available and evaluated Ranyd Cummings. I managed the patient along with the ED Attending.     Electronically Signed by         Polly Traylor DPM  08/29/23 4824 Eugene Sibley DPM  08/29/23 0114

## 2023-08-29 NOTE — PROGRESS NOTES
Patient ID: Annmarie Mcgill is a 58 y.o. male Date of Birth 1960     Diagnosis:  1. Open nondisplaced fracture of body of right calcaneus, initial encounter    2. 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 Diabetic polyneuropathy associated with type 2 diabetes mellitus (HCC)  -     Wound cleansing and dressings; Future    4. Osteomyelitis of ankle or foot, acute, right ([address])       Diagnosis ICD-10-CM Associated Orders   1. Open nondisplaced fracture of body of right calcaneus, initial encounter  S92.014B       2. Diabetic ulcer of right heel associated with type 2 diabetes mellitus, with necrosis of bone (HCC)  X92.489 Wound cleansing and dressings    L97.414       3. Diabetic polyneuropathy associated with type 2 diabetes mellitus (HCC)  E11.42 Wound cleansing and dressings      4. Osteomyelitis of ankle or foot, acute, right ([address])  M86.171            Assessment & Plan:  1. XRay 3 views of the right heel personally read by Dr. Aloysius Siemens in office today and discussed with patient:  Pathologic fracture to posterior body of calcaneus, nondisplaced  Plantar posterior calcaneus cortical reaction, highly suspicious for osteomyelitis in this clinical setting    Patients XR this morning is very concerning for OM and fracture. The wound is purulent and malodorous. He has several significant comorbidities. Recommend he go to the ED for inpatient admission. Salvageability of the limb is questionable. Will need infectious workup, blood cultures, MRI right ankle. He is agreeable to hospital admission. His vitals are stable in the [facility]. High risk for bacteremia/sepsis. Begin IV antibiotics as soon as possible. Chief Complaint   Patient presents with   • Follow Up Wound Care Visit     Right heel wound           Subjective: For care of right heel ulcer. He got an x-ray before coming up to the office. He states he feels well.       The following portions of the patient's history were reviewed and updated as appropriate:   Patient Active Problem List   Diagnosis   • Anxiety and depression   • Type 2 diabetes mellitus with diabetic neuropathy, with long-term current use of insulin ([address])   • Hyperlipidemia   • 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   • Vitamin D deficiency   • PAD (peripheral artery disease) ([facility])   • S/P BKA (below knee amputation), left ([facility])   • Orthostatic hypotension   • Diabetic gastroparesis ([facility])   • Class 2 severe obesity due to excess calories with serious comorbidity and body mass index (BMI) of 35.0 to 35.9 in adult [facility])   • Triple vessel coronary artery disease   • Hypertensive heart disease with congestive heart failure ([facility])   • Chronic diastolic heart failure ([facility])   • Other constipation   • S/P CABG x 3   • Diabetic autonomic neuropathy associated with type 2 diabetes mellitus ([facility])   • Hyponatremia   • Obstructive sleep apnea   • Amputated below knee, left ([facility])   • Phantom limb syndrome without pain ([facility])   • CAD (coronary artery disease)   • Nodular rash   • Elephantiasis nostras verrucosa   • Other acute osteomyelitis, left ankle and foot ([facility])   • Embolism and thrombosis of arteries of the lower extremities ([facility])   • Lymphedema of right lower extremity   • Venous stasis dermatitis of right lower extremity   • Abdominal distension   • Non-pressure chronic ulcer of right calf with fat layer exposed ([address])   • Cellulitis of right ankle   • Fracture, toe   • Lymphedema in adult patient     Past Medical History:   Diagnosis Date   • Anxiety    • Anxiety and depression    • Cataract    • Congestive cardiac failure ([address])    • Coronary artery disease    • Depression    • Diabetes mellitus ([address])    • Diabetic autonomic neuropathy associated with type 2 diabetes mellitus ([address])     Last Assessed: 12/28/2017   • History of DVT (deep vein thrombosis)     left LE   • Hyperlipidemia    • Lymphedema of right lower extremity    • Obesity    • Orthostatic hypotension      Past Surgical History:   Procedure Laterality Date   • CORONARY ARTERY BYPASS GRAFT     • EYE SURGERY      cataracts bilateral   • LEG AMPUTATION THROUGH LOWER TIBIA AND FIBULA Left 8/3/2018    Procedure: AMPUTATION BELOW KNEE (BKA) L BKA;  Surgeon: Yadira Islas MD;  Location: [facility];  Service: Vascular   • DE CORONARY ARTERY BYP W/VEIN & ARTERY GRAFT 4 VEIN N/A 3/28/2018    Procedure: CORONARY ARTERY BYPASS GRAFT (CABG) x3 VESSELS, LIMA TO LAD, SVG--> PDA, SVG--> OM2,  RIGHT LEG EVH/SVH TO , INTRA-OP AMANDEEP;  Surgeon: Melissa Davidson MD;  Location: [facility];  Service: Cardiac Surgery   • ROTATOR CUFF REPAIR Bilateral      Social History     Socioeconomic History   • Marital status: /Civil Union     Spouse name: None   • Number of children: None   • Years of education: None   • Highest education level: None   Occupational History   • None   Tobacco Use   • Smoking status: Former     Packs/day: 1.00     Years: 12.00     Total pack years: 12.00     Types: Cigarettes     Quit date: 3/23/1994     Years since quittin.4   • Smokeless tobacco: Never   Vaping Use   • Vaping Use: Never used   Substance and Sexual Activity   • Alcohol use: No     Comment: rarely   • Drug use: No   • Sexual activity: Not Currently   Other Topics Concern   • None   Social History Narrative   • None     Social Determinants of Health     Financial Resource Strain: Not on file   Food Insecurity: No Food Insecurity (2023)    Hunger Vital Sign    • Worried About Running Out of Food in the Last Year: Never true    • Ran Out of Food in the Last Year: Never true   Transportation Needs: No Transportation Needs (2023)    PRAPARE - Transportation    • Lack of Transportation (Medical): No    • Lack of Transportation (Non-Medical):  No   Physical Activity: Not on file   Stress: Not on file   Social Connections: Moderately Isolated (10/29/2020)    Social Connection and Isolation Panel [NHANES]    • Frequency of Communication with Friends and Family: Three times a week    • Frequency of Social Gatherings with Friends and Family:  Three times a week    • Attends Protestant Services: Never    • Active Member of Clubs or Organizations: No    • Attends Club or Organization Meetings: Never    • Marital Status:    Intimate Partner Violence: Not At Risk (10/29/2020)    Humiliation, Afraid, Rape, and Kick questionnaire    • Fear of Current or Ex-Partner: No    • Emotionally Abused: No    • Physically Abused: No    • Sexually Abused: No   Housing Stability: Low Risk  (4/19/2023)    Housing Stability Vital Sign    • Unable to Pay for Housing in the Last Year: No    • Number of Places Lived in the Last Year: 1    • Unstable Housing in the Last Year: No        Current Outpatient Medications:   •  ammonium lactate (LAC-HYDRIN) 12 % lotion, Apply 1 Application topically in the morning, Disp: 222 mL, Rfl: 0  •  aspirin (ECOTRIN LOW STRENGTH) 81 mg EC tablet, Take 81 mg by mouth daily (Patient not taking: Reported on 5/23/2023), Disp: , Rfl:   •  atorvastatin (LIPITOR) 80 mg tablet, Take 1 tablet (80 mg total) by mouth daily, Disp: 90 tablet, Rfl: 1  •  cholecalciferol (VITAMIN D3) 1,000 units tablet, Take 1,000 Units by mouth daily, Disp: , Rfl:   •  clopidogrel (PLAVIX) 75 mg tablet, TAKE 1 TABLET BY MOUTH DAILY, Disp: 90 tablet, Rfl: 0  •  finasteride (PROSCAR) 5 mg tablet, Take 1 tablet (5 mg total) by mouth daily, Disp: 90 tablet, Rfl: 1  •  furosemide (LASIX) 40 mg tablet, TAKE 1 TABLET BY MOUTH TWICE  DAILY, Disp: 200 tablet, Rfl: 2  •  glucose blood test strip, ReliOn Prime Meter, Disp: , Rfl:   •  Insulin Glargine Solostar (Lantus SoloStar) 100 UNIT/ML SOPN, Inject 0.4 mL (40 Units total) under the skin 2 (two) times a day (Patient taking differently: Inject 60 Units under the skin 2 (two) times a day), Disp: 135 mL, Rfl: 0  •  metoclopramide (REGLAN) 5 mg tablet, Take 1 tablet (5 mg total) by mouth daily, Disp: 90 tablet, Rfl: 1  •  midodrine (PROAMATINE) 2.5 mg tablet, Take 1 tablet (2.5 mg total) by mouth 3 (three) times a day before meals (Patient not taking: Reported on 5/23/2023), Disp: , Rfl: 0  •  pantoprazole (PROTONIX) 40 mg tablet, TAKE 1 TABLET BY MOUTH DAILY IN  THE EARLY MORNING, Disp: 90 tablet, Rfl: 0  •  sodium hypochlorite (DAKIN'S QUARTER-STRENGTH), Irrigate with 1 Application as directed daily, Disp: 473 mL, Rfl: 1  •  tamsulosin (FLOMAX) 0.4 mg, Take 1 capsule (0.4 mg total) by mouth daily with dinner, Disp: 90 capsule, Rfl: 1  Family History   Problem Relation Age of Onset   • Atrial fibrillation Mother    • Stroke Mother    • Hypertension Father    • Heart attack Paternal Grandfather 61   • Diabetes Maternal Grandmother    • Diabetes Maternal Grandfather    • Diabetes Maternal Aunt    • Diabetes Maternal Uncle       Review of Systems   Constitutional: Negative. Respiratory: Negative for cough and shortness of breath. Gastrointestinal: Negative for diarrhea, nausea and vomiting. Musculoskeletal: leg swelling   Skin: heel wound  Neurological: Neuropathy      Allergies:  Metformin      Objective:  /70   Pulse 66   Temp 97.7 °F (36.5 °C)   Resp 18     Physical Exam  Vitals reviewed. Constitutional:       Appearance: He is obese. He is not ill-appearing or diaphoretic. Cardiovascular:      Rate and Rhythm: Normal rate. Pulmonary:      Effort: No respiratory distress. Musculoskeletal:         General: Deformity (left BKA) present. Right lower leg: Edema (severe lymphedema RLE) present. Skin:     Capillary Refill: Capillary refill takes 2 to 3 seconds. Findings: Erythema (wound plantar heel with puruelnce and perwound erythema. Probes close to periosteum. ) present. Neurological:      Sensory: Sensory deficit present.       Gait: Gait abnormal.             Wound 07/21/23 Diabetic Ulcer Heel Right (Active)   Enter Heidy Govea score: Friend Grade 1: Partial or full-thickness ulcer (superficial) 08/22/23 0851   Wound Image   08/29/23 0938   Wound Description Slough; Necrotic;Pink 08/29/23 0937   Anya-wound Assessment Dry; Intact 08/29/23 0937   Wound Length (cm) 5.3 cm 08/29/23 0937   Wound Width (cm) 4.1 cm 08/29/23 6637   Wound Depth (cm) 1 cm 08/29/23 0937   Wound Surface Area (cm^2) 21.73 cm^2 08/29/23 0937   Wound Volume (cm^3) 21.73 cm^3 08/29/23 0937   Calculated Wound Volume (cm^3) 21.73 cm^3 08/29/23 0937   Change in Wound Size % -1666.67 08/29/23 0937   Drainage Amount Moderate 08/29/23 0937   Drainage Description Yellow;Green 08/29/23 0937   Non-staged Wound Description Full thickness 08/29/23 0937   Treatments Irrigation with NSS 08/29/23 0937   Dressing Changed Changed 08/07/23 1419   Patient Tolerance Tolerated well 08/07/23 1419   Dressing Status Removed 08/07/23 1419                         Procedures     Results from last 6 Months   Lab Units 04/17/23 2139   WOUND CULTURE  2+ Growth of - Pantoea septica Pantoea species*           Wound Instructions:  Orders Placed This Encounter   Procedures   • Wound cleansing and dressings        RIGHT HEEL WOUND:  Do not get wound or dressing wet, sponge bath at this time  Apply Dakin's soak for 10 minutes prior to each dressing change  Rinse with normal saline after soak  Apply calcium alginate to wound bed  Cover with ABD  Secure with rolled gauze and paper tape  Change dressing every other day and as needed for excessive drainage  Apply Ace wrap with light to moderate pressure to left foot from base of toes to behind knee     Elevate leg(s) above the level of the heart when sitting or as much as possible.   Keep pressure off right heel wound as much as humanly possible, DO NOT SIT WITH FOOT RESTING ON FLOOR   Limit walking as much as possible and when you do, always wear the offloading shoe     Make sure you are getting 3-4 servings protein per day in diet  Consider low sugar protein shake supplements at least daily]     Patient to go to the ER as per Dr. Maravilla Standard. Follow up at the wound center as needed after hospitalization.      Today's wound treatment note: Dakin's soaked gauze applied, followed by dry dressing. Standing Status:   Future     Standing Expiration Date:   8/29/2024         Diane Berry DPM      Portions of the record may have been created with voice recognition software. Occasional wrong word or "sound a like" substitutions may have occurred due to the inherent limitations of voice recognition software. Read the chart carefully and recognize, using context, where substitutions have occurred.

## 2023-08-29 NOTE — ED ATTENDING ATTESTATION
8/29/2023  I, Nila Anderson MD, saw and evaluated the patient. I have discussed the patient with the resident/non-physician practitioner and agree with the resident's/non-physician practitioner's findings, Plan of Care, and MDM as documented in the resident's/non-physician practitioner's note, except where noted. All available labs and Radiology studies were reviewed. I was present for key portions of any procedure(s) performed by the resident/non-physician practitioner and I was immediately available to provide assistance. At this point I agree with the current assessment done in the Emergency Department. I have conducted an independent evaluation of this patient a history and physical is as follows:    Has had a chronic right heel ulcer that has been getting progressively worse. He has a left leg amputation. He has had no fevers or chills. He has significant right lower extremity edema which is an ongoing problem. The patient does not say he feels particularly ill. The wound itself does not probe to bone do not see significant erythema to suggest any cellulitis. He does not have a fever here does not have SIRS criteria. The patient needs wound care, needing to get off his leg and will need an MRI to rule out the possibility of osteomyelitis. Currently does not need IV antibiotics.     ED Course         Critical Care Time  Procedures

## 2023-08-29 NOTE — ASSESSMENT & PLAN NOTE
· He notes he had orthostatic hypotension but supine hypertension   · He was prescribed midodrine 2.5mg tid ac meals, but states however since he is no longer ambulating or upright he stopped taking it  · Monitor blood pressure, especially with position changes and physical therapy

## 2023-08-29 NOTE — ASSESSMENT & PLAN NOTE
Lab Results   Component Value Date    HGBA1C 6.7 (A) 02/02/2023       No results for input(s): "POCGLU" in the last 72 hours.     Blood Sugar Average: Last 72 hrs:  · pt with DM type 2, with long-term use of insulin, with PAD and neuropathy  · Home regimen lantus 80u BID  · Will cont lantus- but lower dose due to restricted diet in hospital  · Cont accuchecks and SSI

## 2023-08-29 NOTE — ASSESSMENT & PLAN NOTE
Pt is a 57 yo male with PMH significant for DM, PAD, and chronic R heel ulcer who was referred for admission from [address] [place] [address] for concerned over possible infection or fracture. · Xray in office concerning for open nondisplaced fracture of body of R calcaneus  · Pt with chronic diabetic ulcer of R heel:  Purulent drainange noted during Podiatry office exam:   · Per podiatry office xray concerning for osteomyelitis  · Repeat foot Xray done in ER:  Report pending  · D/w Dr. Durant Pac:  Office exam and xray concerning for infection:  Recommends start antibiotics with ancef/flagyl  · Podiatry consult- to follow in hospital:  Tissue cultures obtained  · MRI pending  · Pt had prior admissions with RLE cellulitis and PAD:  Was evaluated by vascular surgery:  Noted to have high risk of limb loss in future:  Pt was not taking ASA or statin at home as recommended. · Per patient:  He spends majority of day in his recliner with heel resting on recliner foot rest and ulcer worsening:  He also needs to ambulate up 2 steps to his house and stands on R heel.   He is interested in [employer]

## 2023-08-29 NOTE — PATIENT INSTRUCTIONS
Orders Placed This Encounter   Procedures    Wound cleansing and dressings        RIGHT HEEL WOUND:  Do not get wound or dressing wet, sponge bath at this time  Apply Dakin's soak for 10 minutes prior to each dressing change  Rinse with normal saline after soak  Apply calcium alginate to wound bed  Cover with ABD  Secure with rolled gauze and paper tape  Change dressing every other day and as needed for excessive drainage  Apply Ace wrap with light to moderate pressure to left foot from base of toes to behind knee     Elevate leg(s) above the level of the heart when sitting or as much as possible. Keep pressure off right heel wound as much as humanly possible, DO NOT SIT WITH FOOT RESTING ON FLOOR   Limit walking as much as possible and when you do, always wear the offloading shoe     Make sure you are getting 3-4 servings protein per day in diet  Consider low sugar protein shake supplements at least daily]     Patient to go to the ER as per Dr. Kiara Hood. Follow up at the wound center as needed after hospitalization. Today's wound treatment note: Dakin's soaked gauze applied, followed by dry dressing.      Standing Status:   Future     Standing Expiration Date:   8/29/2024

## 2023-08-29 NOTE — H&P
[facility]  H&P  Name: Marcelino Perkins 58 y.o. male I MRN: [id number]  Unit/Bed#: LENA POOL I Date of Admission: 8/29/2023   Date of Service: 8/29/2023 I Hospital Day: 0      Assessment/Plan   * Diabetic ulcer of right heel [facility])  Assessment & Plan  Pt is a 57 yo male with PMH significant for DM, PAD, and chronic R heel ulcer who was referred for admission from [address] [place] [address] for concerned over possible infection or fracture. · Xray in office concerning for open nondisplaced fracture of body of R calcaneus  · Pt with chronic diabetic ulcer of R heel:  Purulent drainange noted during Podiatry office exam:   · Per podiatry office xray concerning for osteomyelitis  · Repeat foot Xray done in ER:  Report pending  · D/w Dr. Manjeet Boswell:  Office exam and xray concerning for infection:  Recommends start antibiotics with ancef/flagyl  · Podiatry consult- to follow in hospital:  Tissue cultures obtained  · MRI pending  · Pt had prior admissions with RLE cellulitis and PAD:  Was evaluated by vascular surgery:  Noted to have high risk of limb loss in future:  Pt was not taking ASA or statin at home as recommended. · Per patient:  He spends majority of day in his recliner with heel resting on recliner foot rest and ulcer worsening:  He also needs to ambulate up 2 steps to his house and stands on R heel.   He is interested in STR    CAD (coronary artery disease)  Assessment & Plan  · H/o prior CABG  · prescribd ASA, statin, Plavix: self-discontinued ASA/statin- will restart  · No evidence of acute ischemia  · Cont outpt follow up    Chronic diastolic heart failure (HCC)  Assessment & Plan  Wt Readings from Last 3 Encounters:   07/31/23 118 kg (260 lb)   07/21/23 125 kg (275 lb 9.2 oz)   07/19/23 125 kg (276 lb)       · Most recent Echo with LVEF 2/2020 with normal LVEF, grade 1 diastolic dysfunction  · Euvolemic  · Cont home lasix 40mg bid      Class 2 severe obesity due to excess calories with serious comorbidity and body mass index (BMI) of 35.0 to 35.9 in adult [facility])  Assessment & Plan  Dietary counseling, lifestyle modification    Diabetic gastroparesis ([address])  Assessment & Plan  Lab Results   Component Value Date    HGBA1C 6.7 (A) 02/02/2023       No results for input(s): "POCGLU" in the last 72 hours. Blood Sugar Average: Last 72 hrs:     Cont PPI daily  Cont reglan 5mg tid prn  Cont frequent small meals    Orthostatic hypotension  Assessment & Plan  · He notes he had orthostatic hypotension but supine hypertension   · He was prescribed midodrine 2.5mg tid ac meals, but states however since he is no longer ambulating or upright he stopped taking it  · Monitor blood pressure, especially with position changes and physical therapy    PAD (peripheral artery disease) ([address])  Assessment & Plan  · Pt follows with vascular surgery for PAD  · LEADS 4/23 with "right 50-70% proximal popliteal stenosis"  · H/o prior left BKA in 2018  · Was prescribed ASA, plavix, statin: pt self-discontinued ASA/statin:  Will restart:  D/w pt importance of taking them    Hyperlipidemia  Assessment & Plan  restart statin:  Pt had self-discontinued    Type 2 diabetes mellitus with diabetic neuropathy, with long-term current use of insulin ([facility])  Assessment & Plan  Lab Results   Component Value Date    HGBA1C 6.7 (A) 02/02/2023       No results for input(s): "POCGLU" in the last 72 hours. Blood Sugar Average: Last 72 hrs:  · pt with DM type 2, with long-term use of insulin, with PAD and neuropathy  · Home regimen lantus 80u BID  · Will cont lantus- but lower dose due to restricted diet in hospital  · Cont accuchecks and SSI         Addendum:   Per RN- when pt sat up and gradually transferred to [facility] with assist his sbp dropped to 60's. Asymptomatic.   Will restart midodrine 2.5mg tid  =========================================    History of Present Illness     HPI:  Tyler Rick is a 58 y.o. male who has a history of chronic right heel ulcer. patient notes that he spends a majority of the day and night in his recliner, with his legs elevated and his right heel resting on the recliner. He notes when he needs to leave the house he has 3 stairs he needs to walk down and puts weight on his right heel. He also uses a wheelchair for ground level mobility. Patient saw his podiatrist today Dr. Pipe Almaguer in the office, and there was concerns that the wound had purulent drainage with possible infection. Patient was referred for admission    Patient notes that he and his podiatrist are quite concerned that the ulcer is worsening. He notes the primary reason for this is his inability to keep his right heel from resting on the recliner day and night. He is currently interested in short-term rehab to get daily wound care, and to help prevent pressure on his right heel and worsening wound. Patient notes today he went to the podiatry office for a routine follow-up appointment. He denies any current symptoms. He denies any fevers or chills. He denies any pain in his right ankle. He did not note any worsening discharge or foul-smelling drainage. Patient notes he has discomfort at his lower back where it rests on the recliner. It was evaluated by his home nurse who did not see any bedsores. He notes he however spends most of the day and night in his recliner laying supine on his back with his head elevated. Notes when he lies completely flat he feels as though he is having difficulty breathing. When he has his head elevated he does not have any difficulty breathing. Denies any current chest pain. Denies any current shortness of breath or cough. He denies any abdominal pain. Denies any nausea, vomiting, diarrhea. Notes he is tolerating p.o.    Relates he did not yet eat breakfast or take his pills this morning as he had an early appointment at the podiatrist office.   He did take his Lantus which was 70 units this morning. Patient notes that he stopped his aspirin, statin, and Flomax. He self discontinued these. Denies any adverse reaction.         Historical Information   Past Medical History:   Diagnosis Date   • Anxiety    • Anxiety and depression    • Cataract    • Congestive cardiac failure ([address])    • Coronary artery disease    • Depression    • Diabetes mellitus ([address])    • Diabetic autonomic neuropathy associated with type 2 diabetes mellitus ([address])     Last Assessed: 12/28/2017   • History of DVT (deep vein thrombosis)     left LE   • Hyperlipidemia    • Lymphedema of right lower extremity    • Obesity    • Orthostatic hypotension      Patient Active Problem List   Diagnosis   • Anxiety and depression   • Type 2 diabetes mellitus with diabetic neuropathy, with long-term current use of insulin ([address])   • Hyperlipidemia   • 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   • Vitamin D deficiency   • PAD (peripheral artery disease) ([facility])   • S/P BKA (below knee amputation), left ([facility])   • Orthostatic hypotension   • Diabetic gastroparesis ([facility])   • Class 2 severe obesity due to excess calories with serious comorbidity and body mass index (BMI) of 35.0 to 35.9 in adult [facility])   • Triple vessel coronary artery disease   • Hypertensive heart disease with congestive heart failure ([facility])   • Chronic diastolic heart failure ([facility])   • Other constipation   • S/P CABG x 3   • Diabetic autonomic neuropathy associated with type 2 diabetes mellitus ([facility])   • Hyponatremia   • Obstructive sleep apnea   • Amputated below knee, left ([facility])   • Phantom limb syndrome without pain ([facility])   • CAD (coronary artery disease)   • Nodular rash   • Elephantiasis nostras verrucosa   • Other acute osteomyelitis, left ankle and foot ([facility])   • Embolism and thrombosis of arteries of the lower extremities ([facility])   • Lymphedema of right lower extremity   • Venous stasis dermatitis of right lower extremity   • Abdominal distension   • Non-pressure chronic ulcer of right calf with fat layer exposed ([address])   • Cellulitis of right ankle   • Fracture, toe   • Lymphedema in adult patient   • Diabetic ulcer of right heel ([address])     Past Surgical History:   Procedure Laterality Date   • CORONARY ARTERY BYPASS GRAFT     • EYE SURGERY      cataracts bilateral   • LEG AMPUTATION THROUGH LOWER TIBIA AND FIBULA Left 8/3/2018    Procedure: AMPUTATION BELOW KNEE (BKA) L BKA;  Surgeon: Albina Uribe MD;  [facility];  Service: Vascular   • [place] CORONARY ARTERY BYP W/VEIN & ARTERY GRAFT 4 VEIN N/A 3/28/2018    Procedure: CORONARY ARTERY BYPASS GRAFT (CABG) x3 VESSELS, LIMA TO LAD, SVG--> PDA, SVG--> OM2,  RIGHT LEG EVH/SVH TO , INTRA-OP AMANDEEP;  Surgeon: Kamala Jones MD;  [facility];  Service: Cardiac Surgery   • ROTATOR CUFF REPAIR Bilateral        Social History   Social History     Substance and Sexual Activity   Alcohol Use No    Comment: rarely     Social History     Substance and Sexual Activity   Drug Use No     Social History     Tobacco Use   Smoking Status Former   • Packs/day: 1.00   • Years: 12.00   • Total pack years: 12.00   • Types: Cigarettes   • Quit date: 3/23/1994   • Years since quittin.4   Smokeless Tobacco Never       Family History:   Family History   Problem Relation Age of Onset   • Atrial fibrillation Mother    • Stroke Mother    • Hypertension Father    • Heart attack Paternal Grandfather 61   • Diabetes Maternal Grandmother    • Diabetes Maternal Grandfather    • Diabetes Maternal Aunt    • Diabetes Maternal Uncle        Meds/Allergies     No current facility-administered medications for this encounter.     Current Outpatient Medications:   •  atorvastatin (LIPITOR) 80 mg tablet, Take 1 tablet (80 mg total) by mouth daily, Disp: 90 tablet, Rfl: 1  •  cholecalciferol (VITAMIN D3) 1,000 units tablet, Take 1,000 Units by mouth daily, Disp: , Rfl:   •  clopidogrel (PLAVIX) 75 mg tablet, TAKE 1 TABLET BY MOUTH DAILY, Disp: 90 tablet, Rfl: 0  •  ferrous sulfate 325 (65 Fe) mg tablet, Take 1 tablet every day by oral route., Disp: , Rfl:   •  finasteride (PROSCAR) 5 mg tablet, Take 1 tablet (5 mg total) by mouth daily, Disp: 90 tablet, Rfl: 1  •  furosemide (LASIX) 40 mg tablet, TAKE 1 TABLET BY MOUTH TWICE  DAILY, Disp: 200 tablet, Rfl: 2  •  metoclopramide (REGLAN) 5 mg tablet, Take 1 tablet (5 mg total) by mouth daily, Disp: 90 tablet, Rfl: 1  •  pantoprazole (PROTONIX) 40 mg tablet, TAKE 1 TABLET BY MOUTH DAILY IN  THE EARLY MORNING, Disp: 90 tablet, Rfl: 0  •  sodium hypochlorite (DAKIN'S QUARTER-STRENGTH), Irrigate with 1 Application as directed daily, Disp: 473 mL, Rfl: 1  •  ammonium lactate (LAC-HYDRIN) 12 % lotion, Apply 1 Application topically in the morning, Disp: 222 mL, Rfl: 0  •  aspirin (ECOTRIN LOW STRENGTH) 81 mg EC tablet, Take 81 mg by mouth daily (Patient not taking: Reported on 5/23/2023), Disp: , Rfl:   •  glucose blood test strip, ReliOn Prime Meter, Disp: , Rfl:   •  Insulin Glargine Solostar (Lantus SoloStar) 100 UNIT/ML SOPN, Inject 0.4 mL (40 Units total) under the skin 2 (two) times a day (Patient taking differently: Inject 60 Units under the skin 2 (two) times a day), Disp: 135 mL, Rfl: 0  •  midodrine (PROAMATINE) 2.5 mg tablet, Take 1 tablet (2.5 mg total) by mouth 3 (three) times a day before meals (Patient not taking: Reported on 5/23/2023), Disp: , Rfl: 0  •  tamsulosin (FLOMAX) 0.4 mg, Take 1 capsule (0.4 mg total) by mouth daily with dinner, Disp: 90 capsule, Rfl: 1    Allergies   Allergen Reactions   • Metformin      Allergy listed on nursing home paperwork       Review of Systems  A detailed 12 point review of systems was conducted and is negative apart from those mentioned in the HPI. Objective   Vitals: Blood pressure (!) 176/82, pulse 81, temperature 98.1 °F (36.7 °C), temperature source Oral, resp. rate 18, SpO2 96 %. Physical Exam   General: Very pleasant male.   No acute distress. Nontachypneic   HEENT: EOMI, sclera anicteric, dry mucous membranes,   Neck: supple,   Heart: Regular rate and rhythm, S1S2 present. No murmur, rub or gallop. Lungs; Clear to auscultation bilaterally. No wheezing, crackles or rhonchi. No accessory muscle use or respiratory distress. Abdomen: soft, non-tender, non-distended, NABS. No guarding or rebound. No peritoneal sound or mass. Extremities: Left BKA noted. Right lower extremity lymphedema noted. Dressing placed by podiatry. Neurologic: Alert. Fluent speech. Interactive  Skin: Right lower extremity dressing placed by podiatry    Lab Results:   Results from last 7 days   Lab Units 08/29/23  1210   WBC Thousand/uL 10.54*   HEMOGLOBIN g/dL 13.2   HEMATOCRIT % 42.8   PLATELETS Thousands/uL 363     Results from last 7 days   Lab Units 08/29/23  1210   POTASSIUM mmol/L 4.2   CHLORIDE mmol/L 97   CO2 mmol/L 31   BUN mg/dL 19   CREATININE mg/dL 1.03   CALCIUM mg/dL 9.4         Imaging:  Xray R foot: pending    EKG: pending        Code Status: Full code    Family: Updated patient and his wife at the bedside. Reviewed all test results and treatment plan    Discussed with podiatry: Dr. Pb Lopez: Large amount of pus during today's debridement: Recommend Ancef/Flagyl    Disposition: Due to patient's worsening right heel ulcer, concerns for osteomyelitis and need for IV antibiotics he will be admitted to the hospital.  It is anticipated that his length of stay will be greater than 2 midnights and he will be placed on inpatient status. Portions of the record may have been created with voice recognition software.

## 2023-08-29 NOTE — ASSESSMENT & PLAN NOTE
Lab Results   Component Value Date    HGBA1C 6.7 (A) 02/02/2023       No results for input(s): "POCGLU" in the last 72 hours.     Blood Sugar Average: Last 72 hrs:     Cont PPI daily  Cont reglan 5mg tid prn  Cont frequent small meals

## 2023-08-29 NOTE — ASSESSMENT & PLAN NOTE
Wt Readings from Last 3 Encounters:   07/31/23 118 kg (260 lb)   07/21/23 125 kg (275 lb 9.2 oz)   07/19/23 125 kg (276 lb)       · Most recent Echo with LVEF 2/2020 with normal LVEF, grade 1 diastolic dysfunction  · Euvolemic  · Cont home lasix 40mg bid

## 2023-08-29 NOTE — ASSESSMENT & PLAN NOTE
· H/o prior CABG  · prescribd ASA, statin, Plavix: self-discontinued ASA/statin- will restart  · No evidence of acute ischemia  · Cont outpt follow up

## 2023-08-29 NOTE — CONSULTS
Podiatry - Consultation    Patient Information:   Greta Mendoza 58 y.o. male MRN: [id number]  Unit/Bed#: [facility] Encounter: [id number]  PCP: Johnie Weber DO  Date of Admission:  8/29/2023  Date of Consultation: 08/29/23  Requesting Physician: Soila Pedro MD      ASSESSMENT:    Greta Mendoza is a 58 y.o. male with:    1. Right plantar heel wound with concern for osteomyelitis  2. T2DM (HbA1c 6.7% from 2/2/2023)  3. History of left BKA  4. Severe lymphedema RLE  5. PAD  6. Ambulatory dysfunction      PLAN:    · Right heel wound debrided at bedside, see procedure note. Wound stable with negative probe to bone, no purulence noted. · Right foot XRs taken, per my personal read, no acute osseous abnormalities noted, no signs of osteomyelitis, no cortical destruction or periosteal reaction noted at plantar calcaneus, radiolucent lines consistent with soft tissue creases. Follow-up final read. · Right foot/heel MRI pending for further assessment of possible calcaneal OM. · Wound cultures taken. · Local wound care consisting of betadine wet to dry/DSD. Wound care instructions placed. · Elevation on green foam wedges or pillows when non-ambulatory. · Rest of care per primary team.  · Will discuss this plan with my attending and update as needed. Weightbearing status: Nonweightbearing right foot    Debridement Procedure:  After  Verbal Consent was obtained and time out performed. Wound located right plantar foot was  excisionally Surgical- Subcutaneous  (CPT: 55216) debrided using scapel, scissors. Pre-debridement wound measures 5 cm x 4 cm x 1.3 cm. Pain was controlled by Lack of sensation due to Neuropathy . Post debridement measurement 5.3 cm x 4.1 cm x 1.5 cm for a total of 20 square centimeters, with wound appearing Fresh bleeding tissue, viable and granular. 100%  of wound debrided.  Tissue debrided includes depth of Subcutaneous with devitalized tissue being debrided including Hyperkeratosis, Slough and Fibrous. with a small amount of bleeding bleeding was noted during procedure. Hemostasis was achieved using pressure. Pain noted during procedure rated as 0. Pain noted after procedure rated as 0. Procedure was Well tolerated by patient. SUBJECTIVE:    History of Present Illness:    Francisco Coelho is a 58 y.o. male who is originally admitted 8/29/2023 due to ambulatory dysfunction and right plantar heel wound. Patient has a past medical history of T2DM, PAD, hyperlipidemia, orthostatic hypotension, diabetic gastroparesis, chronic diastolic heart failure, CAD, and right heel wound. We are consulted for right plantar heel wound, concern for underlying bone infection. Patient was seen in wound care earlier today. While at wound care it was recommend that patient go to ED for further wound evaluation due to concern for osteomyelitis of underlying bone. Patient has a history of left BKA (8/2018). Patient states that his plantar right heel wound started about 6 weeks ago. XRs taken at wound care earlier today were conrcerning for possible pathologic calcaneal fracture secondary to bone infection, however were read as possible artifact from dressings. Patient denies any nausea, vomiting, fever, chills chest pain or shortness of breath at this time. Additionally he states that he has visiting nurses that come to his house twice a week for right heel wound dressing changes. Patient has been consistent in seeing Dr. Raiza Flanagan and wound care once a week. Lastly he states that he has trouble at home staying off of his foot. Review of Systems:    Constitutional: obese. HENT: Negative. Eyes: Negative. Respiratory: Negative. Cardiovascular: Negative. Gastrointestinal: Negative. Musculoskeletal: no muscle function from below right knee, left BKA noted. Abnormal gait. Skin:right plantar heel wound, severe lymphedema RLE  Neurological: no sensation from below right knee  Psych: Negative.      Past Medical and Surgical History:     Past Medical History:   Diagnosis Date   • Anxiety    • Anxiety and depression    • Cataract    • Congestive cardiac failure ([address])    • Coronary artery disease    • Depression    • Diabetes mellitus ([address])    • Diabetic autonomic neuropathy associated with type 2 diabetes mellitus ([address])     Last Assessed: 2017   • History of DVT (deep vein thrombosis)     left LE   • Hyperlipidemia    • Lymphedema of right lower extremity    • Obesity    • Orthostatic hypotension        Past Surgical History:   Procedure Laterality Date   • CORONARY ARTERY BYPASS GRAFT     • EYE SURGERY      cataracts bilateral   • LEG AMPUTATION THROUGH LOWER TIBIA AND FIBULA Left 8/3/2018    Procedure: AMPUTATION BELOW KNEE (BKA) L BKA;  Surgeon: Timothy Marinelli MD;  [facility];  Service: Vascular   • [place] CORONARY ARTERY BYP W/VEIN & ARTERY GRAFT 4 VEIN N/A 3/28/2018    Procedure: CORONARY ARTERY BYPASS GRAFT (CABG) x3 VESSELS, LIMA TO LAD, SVG--> PDA, SVG--> OM2,  RIGHT LEG EVH/SVH TO , INTRA-OP AMANDEEP;  Surgeon: Tiff Kat MD;  [facility];  Service: Cardiac Surgery   • ROTATOR CUFF REPAIR Bilateral        Meds/Allergies:    (Not in a hospital admission)      Allergies   Allergen Reactions   • Metformin      Allergy listed on nursing home paperwork       Social History:     Marital Status: /Civil Union    Substance Use History:   Social History     Substance and Sexual Activity   Alcohol Use No    Comment: rarely     Social History     Tobacco Use   Smoking Status Former   • Packs/day: 1.00   • Years: 12.00   • Total pack years: 12.00   • Types: Cigarettes   • Quit date: 3/23/1994   • Years since quittin.4   Smokeless Tobacco Never     Social History     Substance and Sexual Activity   Drug Use No       Family History:    Family History   Problem Relation Age of Onset   • Atrial fibrillation Mother    • Stroke Mother    • Hypertension Father    • Heart attack Paternal Grandfather 61   • Diabetes Maternal Grandmother    • Diabetes Maternal Grandfather    • Diabetes Maternal Aunt    • Diabetes Maternal Uncle          OBJECTIVE:    Vitals:   Blood Pressure: (!) 176/82 (08/29/23 1351)  Pulse: 81 (08/29/23 1351)  Temperature: 98.1 °F (36.7 °C) (08/29/23 1049)  Temp Source: Oral (08/29/23 1049)  Respirations: 18 (08/29/23 1351)  SpO2: 96 % (08/29/23 1351)    Physical Exam:    General Appearance: Alert, cooperative, no distress. HEENT: Head normocephalic, atraumatic, without obvious abnormality. Heart: Normal rate and rhythm. Lungs: Non-labored breathing. No respiratory distress. Abdomen: Without distension. Psychiatric: AAOx3  Lower Extremity:  Vascular:   Right DP and PT pulses are weak. CRT < 3 seconds at the digits. +4/4 edema noted at bilateral lower extremities. Pedal hair is absent. Skin temperature is WNL RLE. Musculoskeletal:  MMT is 3/5 in all muscle compartments RLE. PROM at the 1st MPJ and ankle joint are decreased bilaterally with the leg extended. Dermatological:  Lower extremity wound(s) as noted below:    Wound #: 1  Location: right plantar heel wound  Length 5.3cm: Width 4.1cm: Depth 1.5cm  Deepest Tissue Noted in Base: muscle  Probe to Bone: No  Peripheral Skin Description: Attached  Granulation: 60% Fibrotic Tissue: 40% Necrotic Tissue: 0%   Drainage Amount: minimal, clear  Signs of Infection: No      Neurological:  Gross sensation is absent. Protective sensation is absent. Patient Reports numbness and/or paresthesias.     Clinical Images 08/29/23:                      Additional data:     Lab Results: I have personally reviewed pertinent labs including:    Results from last 7 days   Lab Units 08/29/23  1210   WBC Thousand/uL 10.54*   HEMOGLOBIN g/dL 13.2   HEMATOCRIT % 42.8   PLATELETS Thousands/uL 363   NEUTROS PCT % 75   LYMPHS PCT % 17   MONOS PCT % 5   EOS PCT % 2     Results from last 7 days   Lab Units 08/29/23  1210   POTASSIUM mmol/L 4.2   CHLORIDE mmol/L 97   CO2 mmol/L 31   BUN mg/dL 19   CREATININE mg/dL 1.03   CALCIUM mg/dL 9.4   ALK PHOS U/L 49   ALT U/L 9   AST U/L 12*     Results from last 7 days   Lab Units 08/29/23  1210   INR  1.08                 Imaging: I have personally reviewed pertinent reports in PACS. EKG, Pathology, and Other Studies: I have personally reviewed pertinent reports. ** Please Note: Portions of the record may have been created with voice recognition software. Occasional wrong word or "sound a like" substitutions may have occurred due to the inherent limitations of voice recognition software. Read the chart carefully and recognize, using context, where substitutions have occurred.  **

## 2023-08-29 NOTE — ASSESSMENT & PLAN NOTE
· Pt follows with vascular surgery for PAD  · LEADS 4/23 with "right 50-70% proximal popliteal stenosis"  · H/o prior left BKA in 2018  · Was prescribed ASA, plavix, statin: pt self-discontinued ASA/statin:  Will restart:  D/w pt importance of taking them

## 2023-08-30 ENCOUNTER — APPOINTMENT (INPATIENT)
Dept: MRI IMAGING | Facility: HOSPITAL | Age: 63
End: 2023-08-30
Payer: COMMERCIAL

## 2023-08-30 ENCOUNTER — HOME CARE VISIT (OUTPATIENT)
Dept: HOME HEALTH SERVICES | Facility: HOME HEALTHCARE | Age: 63
End: 2023-08-30
Payer: COMMERCIAL

## 2023-08-30 DIAGNOSIS — R33.9 URINARY RETENTION: ICD-10-CM

## 2023-08-30 LAB
GLUCOSE SERPL-MCNC: 118 MG/DL (ref 65–140)
GLUCOSE SERPL-MCNC: 136 MG/DL (ref 65–140)
GLUCOSE SERPL-MCNC: 157 MG/DL (ref 65–140)
GLUCOSE SERPL-MCNC: 160 MG/DL (ref 65–140)

## 2023-08-30 PROCEDURE — 97167 OT EVAL HIGH COMPLEX 60 MIN: CPT

## 2023-08-30 PROCEDURE — 73721 MRI JNT OF LWR EXTRE W/O DYE: CPT

## 2023-08-30 PROCEDURE — 99232 SBSQ HOSP IP/OBS MODERATE 35: CPT | Performed by: PHYSICIAN ASSISTANT

## 2023-08-30 PROCEDURE — 82948 REAGENT STRIP/BLOOD GLUCOSE: CPT

## 2023-08-30 PROCEDURE — 97163 PT EVAL HIGH COMPLEX 45 MIN: CPT

## 2023-08-30 RX ORDER — LORAZEPAM 2 MG/ML
1 INJECTION INTRAMUSCULAR ONCE AS NEEDED
Status: DISCONTINUED | OUTPATIENT
Start: 2023-08-30 | End: 2023-09-02 | Stop reason: HOSPADM

## 2023-08-30 RX ORDER — LORAZEPAM 2 MG/ML
1 INJECTION INTRAMUSCULAR ONCE AS NEEDED
Status: COMPLETED | OUTPATIENT
Start: 2023-08-30 | End: 2023-08-30

## 2023-08-30 RX ORDER — MAGNESIUM SULFATE HEPTAHYDRATE 40 MG/ML
2 INJECTION, SOLUTION INTRAVENOUS ONCE
Status: COMPLETED | OUTPATIENT
Start: 2023-08-30 | End: 2023-08-30

## 2023-08-30 RX ADMIN — CLOPIDOGREL BISULFATE 75 MG: 75 TABLET ORAL at 08:24

## 2023-08-30 RX ADMIN — HEPARIN SODIUM 5000 UNITS: 5000 INJECTION INTRAVENOUS; SUBCUTANEOUS at 14:38

## 2023-08-30 RX ADMIN — FUROSEMIDE 40 MG: 40 TABLET ORAL at 07:34

## 2023-08-30 RX ADMIN — INSULIN GLARGINE 40 UNITS: 100 INJECTION, SOLUTION SUBCUTANEOUS at 21:57

## 2023-08-30 RX ADMIN — TAMSULOSIN HYDROCHLORIDE 0.4 MG: 0.4 CAPSULE ORAL at 15:33

## 2023-08-30 RX ADMIN — FUROSEMIDE 40 MG: 40 TABLET ORAL at 15:33

## 2023-08-30 RX ADMIN — MIDODRINE HYDROCHLORIDE 2.5 MG: 5 TABLET ORAL at 12:18

## 2023-08-30 RX ADMIN — INSULIN GLARGINE 40 UNITS: 100 INJECTION, SOLUTION SUBCUTANEOUS at 08:24

## 2023-08-30 RX ADMIN — CEFAZOLIN SODIUM 2000 MG: 2 SOLUTION INTRAVENOUS at 14:38

## 2023-08-30 RX ADMIN — ASPIRIN 81 MG: 81 TABLET, COATED ORAL at 08:24

## 2023-08-30 RX ADMIN — CEFAZOLIN SODIUM 2000 MG: 2 SOLUTION INTRAVENOUS at 22:43

## 2023-08-30 RX ADMIN — INSULIN LISPRO 1 UNITS: 100 INJECTION, SOLUTION INTRAVENOUS; SUBCUTANEOUS at 21:57

## 2023-08-30 RX ADMIN — FINASTERIDE 5 MG: 5 TABLET, FILM COATED ORAL at 15:33

## 2023-08-30 RX ADMIN — LORAZEPAM 1 MG: 2 INJECTION INTRAMUSCULAR; INTRAVENOUS at 13:03

## 2023-08-30 RX ADMIN — METOCLOPRAMIDE 5 MG: 5 TABLET ORAL at 08:24

## 2023-08-30 RX ADMIN — PANTOPRAZOLE SODIUM 40 MG: 40 TABLET, DELAYED RELEASE ORAL at 05:15

## 2023-08-30 RX ADMIN — METRONIDAZOLE 500 MG: 500 INJECTION, SOLUTION INTRAVENOUS at 21:58

## 2023-08-30 RX ADMIN — MIDODRINE HYDROCHLORIDE 2.5 MG: 5 TABLET ORAL at 06:09

## 2023-08-30 RX ADMIN — MAGNESIUM SULFATE 2 G: 2 INJECTION INTRAVENOUS at 07:34

## 2023-08-30 RX ADMIN — METRONIDAZOLE 500 MG: 500 INJECTION, SOLUTION INTRAVENOUS at 05:15

## 2023-08-30 RX ADMIN — HEPARIN SODIUM 5000 UNITS: 5000 INJECTION INTRAVENOUS; SUBCUTANEOUS at 05:15

## 2023-08-30 RX ADMIN — MIDODRINE HYDROCHLORIDE 2.5 MG: 5 TABLET ORAL at 15:33

## 2023-08-30 RX ADMIN — ATORVASTATIN CALCIUM 80 MG: 40 TABLET, FILM COATED ORAL at 15:33

## 2023-08-30 RX ADMIN — CEFAZOLIN SODIUM 2000 MG: 2 SOLUTION INTRAVENOUS at 06:07

## 2023-08-30 RX ADMIN — HEPARIN SODIUM 5000 UNITS: 5000 INJECTION INTRAVENOUS; SUBCUTANEOUS at 21:57

## 2023-08-30 RX ADMIN — METRONIDAZOLE 500 MG: 500 INJECTION, SOLUTION INTRAVENOUS at 14:38

## 2023-08-30 NOTE — ASSESSMENT & PLAN NOTE
· Pt follows with vascular surgery for PAD  · LEADS 4/23 with "right 50-70% proximal popliteal stenosis"  · H/o prior left BKA in 2018  · Was prescribed ASA, plavix, statin  · Pt self-discontinued ASA/statin:   · Restarted restart ASA and statin:  D/w pt importance of taking them

## 2023-08-30 NOTE — ASSESSMENT & PLAN NOTE
Pt is a 57 yo male with PMH significant for DM, PAD, and chronic R heel ulcer who was referred for admission from [address] [place] [address] for concerned over possible infection or fracture. · Pt had prior admissions with RLE cellulitis and PAD:  Was evaluated by vascular surgery:  Noted to have high risk of limb loss in future:  Pt was not taking ASA or statin at home as recommended. · Per patient:  He spends majority of day in his recliner with heel resting on recliner foot rest and ulcer worsening:  He also needs to ambulate up 2 steps to his house and stands on R heel.     · He is interested in STR  · Xray in office concerning for open nondisplaced fracture of body of R calcaneus  · Pt with chronic diabetic ulcer of R heel:  Purulent drainange noted during Podiatry office exam:   · Per podiatry office xray concerning for osteomyelitis  · Xray foot and heel obtained: Suspected calcaneal fractures, large plantar ulcer over posterior calculus, findings indeterminate for osteomyelitis along plantar aspect of calcaneus  · Dr. Sharifa Gutierrez:  Office exam and pulmonary xray concerning for infection:  Recommended IV antibiotics with ancef/flagyl- continue   · Podiatry following: tissue cultures obtained - pending  · MRI pending-being performed today

## 2023-08-30 NOTE — PLAN OF CARE
Problem: PHYSICAL THERAPY ADULT  Goal: Performs mobility at highest level of function for planned discharge setting. See evaluation for individualized goals. Description: Treatment/Interventions: Functional transfer training, LE strengthening/ROM, Therapeutic exercise, Endurance training, Patient/family training, Equipment eval/education, Bed mobility, Gait training, Spoke to nursing, OT, Compensatory technique education  Equipment Recommended: Other (Comment) (slide board)       See flowsheet documentation for full assessment, interventions and recommendations. Note: Prognosis: Fair  Problem List: Decreased strength, Decreased endurance, Impaired balance, Decreased mobility, Decreased safety awareness, Impaired judgement, Obesity, Decreased skin integrity, Impaired sensation  Assessment: David Villeda is a 58 y.o. male admitted to [facility] on 8/29/2023 for Diabetic ulcer of right heel ([address]). PT was consulted and pt was seen on 8/30/2023 for mobility assessment and d/c planning. Pt presents with high risk for falls, and R NWB status per podiatry. Pt is currently functioning at a maximum assistance x2 level for transfers. Pt demonstrated increased need for physical assist, impaired strength and activity intolerance upon initial evaluation. Trialed sit>stand with RW and Ax2 however pt unable to reach more than 25% of full standing position due to limited LLE strength. Pt did not demonstrate good compliance with RLE NWB, and was unable to complete stand without weightbearing despite pt education. Further mobility declined by pt due to fatigue and therapist limited by WBS compliance. Pt may benefit with sliding board transfers to maximize independence and limit WB in future session. At baseline, pt was mod I with mobility and transfers in manual WC.   Pt will benefit from continued skilled IP PT to address the above mentioned impairments  in order to maximize recovery and increase functional independence when completing mobility and ADLs. Currently PT recommendations for DME include sliding board. At this time PT recommendations for d/c are post acute rehab services. Barriers to Discharge: Decreased caregiver support, Other (Comment) (WBS non-compliance, inc physical assist)     PT Discharge Recommendation: Post acute rehabilitation services    See flowsheet documentation for full assessment.

## 2023-08-30 NOTE — CASE MANAGEMENT
Case Management Assessment & Discharge Planning Note    Patient name Brad Sit  Location [address] [place] [address] /[address] MRN [id number]  : 1960 Date 2023       Current Admission Date: 2023  Current Admission Diagnosis:Diabetic ulcer of right heel [facility])   Patient Active Problem List    Diagnosis Date Noted   • Diabetic ulcer of right heel ([address]) 2023   • Lymphedema in adult patient 2023   • Fracture, toe 2023   • Cellulitis of right ankle 2023   • Non-pressure chronic ulcer of right calf with fat layer exposed ([address]) 2023   • Other acute osteomyelitis, left ankle and foot ([address]) 10/29/2020   • Embolism and thrombosis of arteries of the lower extremities ([address]) 10/29/2020   • Lymphedema of right lower extremity 10/29/2020   • Venous stasis dermatitis of right lower extremity 10/29/2020   • Abdominal distension 10/29/2020   • 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   • Nodular rash 2020   • CAD (coronary artery disease) 2019   • Amputated below knee, left ([address]) 10/04/2018   • Phantom limb syndrome without pain ([address]) 10/04/2018   • Obstructive sleep apnea 2018   • Hyponatremia 2018   • Diabetic autonomic neuropathy associated with type 2 diabetes mellitus ([address]) 2018   • S/P CABG x 3 2018   • Other constipation 2018   • Chronic diastolic heart failure ([address]) 2018   • Hypertensive heart disease with congestive heart failure (HCC)    • Triple vessel coronary artery disease 2018   • Diabetic gastroparesis ([address]) 2018   • Class 2 severe obesity due to excess calories with serious comorbidity and body mass index (BMI) of 35.0 to 35.9 in adult ([address]) 2018   • Orthostatic hypotension 2018   • PAD (peripheral artery disease) ([address]) 2018   • S/P BKA (below knee amputation), left ([address]) 2018   • Anxiety and depression 2017   • 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 12/28/2017   • Vitamin D deficiency 09/20/2016   • Hyperlipidemia 02/11/2015   • Type 2 diabetes mellitus with diabetic neuropathy, with long-term current use of insulin ([address]) 11/06/2014      LOS (days): 1  Geometric Mean LOS (GMLOS) (days): 5.40  Days to GMLOS:4.4     OBJECTIVE:    Risk of Unplanned Readmission Score: 14.25         Current admission status: Inpatient       Preferred Pharmacy:   [facility] [address] RAZIA PEREIRA [address], [place] - [address] [facility] [address]  Phone: [phone] Fax: [phone]    [facility] ([address] [facility]  [address] [facility]  [address] [facility] [address] [address]  Phone: [phone] Fax: [phone]    [facility] ([facility]) - [place], [address] [facility]  [address] [place] [address]  Phone: [phone] Fax: [phone]    Primary Care Provider: Sneha Aviles DO    Primary Insurance: [facility]urya Galaviz [facility]  Secondary Insurance:     ASSESSMENT:  [address] 22 Representative - Spouse   Primary Phone: [phone] (Mobile)                              Patient Information  Admitted from[de-identified] Home  Mental Status: Alert  During Assessment patient was accompanied by: Not accompanied during assessment  Assessment information provided by[de-identified] Patient  Primary Caregiver: Self (Patient cares for himself and his wife assists with some ADLs)  Support Systems: Spouse/significant other  [facility]: [address] [place] [address] do you live in?: [address] entry access options.  Select all that apply.: Stairs  Number of steps to enter home.: 4  Do the steps have railings?: Yes  Type of Current Residence: [address] [place] home  Upon entering residence, is there a bedroom on the main floor (no further steps)?: No  A bedroom is located on the following floor levels of residence (select all that apply):: 2nd Floor  Upon entering residence, is there a bathroom on the main floor (no further steps)?: Yes (half bathroom)  Number of steps to 2nd floor from main floor: One Flight  In the last 12 months, was there a time when you were not able to pay the mortgage or rent on time?: No  In the last 12 months, how many places have you lived?: 1  In the last 12 months, was there a time when you did not have a steady place to sleep or slept in a shelter (including now)?: No  Homeless/housing insecurity resource given?: N/A  Living Arrangements: Lives w/ Spouse/significant other    Activities of Daily Living Prior to Admission  Functional Status: Independent  Completes ADLs independently?: Yes (Pt. will bath himself and seeks support from his wife who cooks meals)  Ambulates independently?: No  Level of ambulatory dependence: Assistance (Pt. utilizes a wheelchair)  Does patient use assisted devices?: Yes  Assisted Devices (DME) used: Wheelchair, Other (Comment) (electric lift)  Does patient currently own DME?: Yes  What DME does the patient currently own?: Wheelchair, Other (Comment) (electric lift)  Does patient have a history of Outpatient Therapy (PT/OT)?: No  Does the patient have a history of Short-Term Rehab?: Yes (recent rehab at [facility] for 1 month.  [facility] 2 [facility])  Does patient have a history of HHC?: Yes (Home health care for 6 weeks due to wound care)  Does patient currently have [facility]?: Yes    Current Home Health Care  Type of Current Home Care Services: Nurse visit    Patient Information Continued  Income Source: SSI/SSD  Does patient have prescription coverage?: Yes  Within the past 12 months, you worried that your food would run out before you got the money to buy more.: Never true  Within the past 12 months, the food you bought just didn't last and you didn't have money to get more.: Never true  Food insecurity resource given?: N/A  Does patient receive dialysis treatments?: No  Does patient have a history of substance abuse?: No  Does patient have a history of Mental Health Diagnosis?: No         Means of Transportation  Means of Transport to Appts[de-identified] Other (Comment) (Transportation form [employer])  Was application for public transport provided?: N/A        DISCHARGE DETAILS:    Discharge planning discussed with[de-identified] Pt. Freedom of Choice: Yes  Comments - Freedom of Choice: agreeable to inpatient rehab if recommended  CM contacted family/caregiver?: No- see comments (Pt. would like his wife, Henna Zhou to be notified as needed)  Were Treatment Team discharge recommendations reviewed with patient/caregiver?: Yes  Did patient/caregiver verbalize understanding of patient care needs?: Yes  Were patient/caregiver advised of the risks associated with not following Treatment Team discharge recommendations?: Yes              DME Referral Provided  Referral made for DME?: No    Other Referral/Resources/Interventions Provided:  Interventions: Short Term Rehab         Treatment Team Recommendation: Short Term Rehab                                            Additional Comments: Possible inpatient rehab discussed; pt. agreeable- discussed financial scarity due to limited income; no issues reported within the last year-pt. shared his home is not handicap accessible-he sleeps in a recliner on the main floor nightly.

## 2023-08-30 NOTE — PROGRESS NOTES
[facility]  Progress Note  Name: Angélica Alaniz I  MRN: [id number]  Unit/Bed#: E2 -01 I Date of Admission: 8/29/2023   Date of Service: 8/30/2023 I Hospital Day: 1    Assessment/Plan   * Diabetic ulcer of right heel [facility])  Assessment & Plan  Pt is a 59 yo male with PMH significant for DM, PAD, and chronic R heel ulcer who was referred for admission from [address] [place] [address] for concerned over possible infection or fracture. · Pt had prior admissions with RLE cellulitis and PAD:  Was evaluated by vascular surgery:  Noted to have high risk of limb loss in future:  Pt was not taking ASA or statin at home as recommended. · Per patient:  He spends majority of day in his recliner with heel resting on recliner foot rest and ulcer worsening:  He also needs to ambulate up 2 steps to his house and stands on R heel. · He is interested in STR  · Xray in office concerning for open nondisplaced fracture of body of R calcaneus  · Pt with chronic diabetic ulcer of R heel:  Purulent drainange noted during Podiatry office exam:   · Per podiatry office xray concerning for osteomyelitis  · Xray foot and heel obtained: Suspected calcaneal fractures, large plantar ulcer over posterior calculus, findings indeterminate for osteomyelitis along plantar aspect of calcaneus  · Dr. Angela Mares:  Office exam and pulmonary xray concerning for infection:  Recommended IV antibiotics with ancef/flagyl- continue   · Podiatry following: tissue cultures obtained - pending  · MRI pending-being performed today    Orthostatic hypotension  Assessment & Plan  · He notes he had orthostatic hypotension but supine hypertension   · He was prescribed midodrine 2.5 mg tid ac meals, but stated however since he is no longer ambulating or upright he stopped taking it  · Per RN-  When pt used commode his sbp dropped to 60's:   Will restart home midodrine  · Caution with standing and advised slow movements    PAD (peripheral artery disease) ([address] )  Assessment & Plan  · Pt follows with vascular surgery for PAD  · LEADS 4/23 with "right 50-70% proximal popliteal stenosis"  · H/o prior left BKA in 2018  · Was prescribed ASA, plavix, statin  · Pt self-discontinued ASA/statin:   · Restarted restart ASA and statin:  D/w pt importance of taking them    Type 2 diabetes mellitus with diabetic neuropathy, with long-term current use of insulin [facility])  Assessment & Plan  Lab Results   Component Value Date    HGBA1C 6.7 (A) 02/02/2023     Recent Labs     08/29/23  1609 08/29/23  2111 08/30/23  0607 08/30/23  1054   POCGLU 137 117 136 160*   · Uses long-term use of insulin, with PAD and neuropathy  · Home regimen lantus 80u BID  · Will continue lantus- but lowered dose to due to 40 units BID given restricted diet in hospital  · Cont accuchecks and SSI    CAD (coronary artery disease)  Assessment & Plan  · H/o prior CABG  · Prescribd ASA, statin, Plavix: self-discontinued ASA/statin- restarted  · No evidence of acute ischemia  · Cont outpt follow up    Chronic diastolic heart failure (HCC)  Assessment & Plan  Wt Readings from Last 3 Encounters:   08/30/23 118 kg (259 lb 4.2 oz)   07/31/23 118 kg (260 lb)   07/21/23 125 kg (275 lb 9.2 oz)   · Most recent Echo with LVEF 2/2020 with normal LVEF, grade 1 diastolic dysfunction  · Continue home lasix 40 mg bid    Class 2 severe obesity due to excess calories with serious comorbidity and body mass index (BMI) of 35.0 to 35.9 in adult [facility])  Assessment & Plan  · Dietary counseling, lifestyle modification    Diabetic gastroparesis (HCC)  Assessment & Plan  · Continue PPI daily  · Continue reglan 5mg tid prn  · Continue frequent small meals    S/P BKA (below knee amputation), left (HCC)  Assessment & Plan  · Left leg prosthesis in place    Hyperlipidemia  Assessment & Plan  · Patient self discontinued statin  · Restarted here      VTE Pharmacologic Prophylaxis:   Pharmacologic: Heparin  Mechanical VTE Prophylaxis in Place: Yes    Discharge Plan: With need for inpatient stay for IV antibiotics in the setting of possible osteomyelitis of right foot and further podiatry intervention    Discussions with Specialists or Other Care Team Provider: nursing    Education and Discussions with Family / Patient: Patient    Time Spent for Care: 45 minutes. This time was spent on one or more of the following: performing physical exam; counseling and coordination of care; obtaining or reviewing history; documenting in the medical record; reviewing/ordering tests, medications, or procedures; communicating with other healthcare professionals and discussing with patient's family/caregivers. Current Length of Stay: 1 day(s)  Current Patient Status: Inpatient   Code Status: Level 1 - Full Code    Subjective:   Patient resting in chair at bedside. He reports lack of feeling in his right foot. He is concerned about getting his MRI today. He reports feeling claustrophobic in small spaces and was requesting medication to help calm him. He also reports difficulty with lying flat on his back and was requesting some type of gel. Objective:     Vitals:   Temp (24hrs), Av.6 °F (36.4 °C), Min:97 °F (36.1 °C), Max:98 °F (36.7 °C)    Temp:  [97 °F (36.1 °C)-98 °F (36.7 °C)] 97 °F (36.1 °C)  HR:  [67-83] 67  Resp:  [17-18] 17  BP: ()/(47-72) 136/57  SpO2:  [96 %-97 %] 97 %  Body mass index is 34.21 kg/m². Input and Output Summary (last 24 hours): Intake/Output Summary (Last 24 hours) at 2023 1352  Last data filed at 2023 1105  Gross per 24 hour   Intake --   Output 1100 ml   Net -1100 ml       Physical Exam:     Physical Exam  Vitals and nursing note reviewed. Constitutional:       General: He is not in acute distress. Appearance: Normal appearance. He is obese. He is not ill-appearing, toxic-appearing or diaphoretic. HENT:      Head: Normocephalic and atraumatic.    Eyes:      General: No scleral icterus. Cardiovascular:      Rate and Rhythm: Normal rate and regular rhythm. Pulmonary:      Effort: No respiratory distress. Breath sounds: Normal breath sounds. No wheezing. Abdominal:      General: Bowel sounds are normal. There is no distension. Palpations: Abdomen is soft. There is no mass. Tenderness: There is no abdominal tenderness. Hernia: No hernia is present. Musculoskeletal:      Cervical back: Neck supple. Right lower leg: Edema present. Comments: Left leg prosthesis in place. Right heel and foot gauze wrapped. Edematous right lower leg   Skin:     General: Skin is warm and dry. Neurological:      Mental Status: He is oriented to person, place, and time. Mental status is at baseline. Psychiatric:         Mood and Affect: Mood normal.         Behavior: Behavior normal.         Additional Data:     Labs:    Results from last 7 days   Lab Units 08/29/23  1210   WBC Thousand/uL 10.54*   HEMOGLOBIN g/dL 13.2   HEMATOCRIT % 42.8   PLATELETS Thousands/uL 363   NEUTROS PCT % 75   LYMPHS PCT % 17   MONOS PCT % 5   EOS PCT % 2     Results from last 7 days   Lab Units 08/29/23  1210   POTASSIUM mmol/L 4.2   CHLORIDE mmol/L 97   CO2 mmol/L 31   BUN mg/dL 19   CREATININE mg/dL 1.03   CALCIUM mg/dL 9.4   ALK PHOS U/L 49   ALT U/L 9   AST U/L 12*     Results from last 7 days   Lab Units 08/29/23  1210   INR  1.08       * I Have Reviewed All Lab Data Listed Above. * Additional Pertinent Lab Tests Reviewed: [facility] Admission Reviewed    Imaging:    Imaging Reports Reviewed Today Include: xray right foot and heel  Imaging Personally Reviewed by Myself Includes:      Recent Cultures (last 7 days):     Results from last 7 days   Lab Units 08/29/23  1210 08/29/23  1208 08/29/23  1130   BLOOD CULTURE  Received in Microbiology Lab. Culture in Progress. Received in Microbiology Lab. Culture in Progress.   --    GRAM STAIN RESULT   --   --  4+ Gram negative coccobacilli*  No polys seen*   WOUND CULTURE   --   --  3+ Growth of Gram Negative Viktor Enteric Like*       Lines/Drains:  Invasive Devices     Peripheral Intravenous Line  Duration           Peripheral IV 08/29/23 Left Antecubital 1 day                Last 24 Hours Medication List:   Current Facility-Administered Medications   Medication Dose Route Frequency Provider Last Rate   • acetaminophen  650 mg Oral Q6H PRN Suzan Yanes MD     • aspirin  81 mg Oral Daily Suzan Yanes MD     • atorvastatin  80 mg Oral Daily With Ijeoma Still MD     • calcium carbonate  1,000 mg Oral Daily PRN Suzan Yanes MD     • cefazolin  2,000 mg Intravenous Mani Mota MD 2,000 mg (08/30/23 2131)   • clopidogrel  75 mg Oral Daily Suzan Yanes MD     • Diclofenac Sodium  2 g Topical BID PRN Kierra Kelley PA-C     • docusate sodium  100 mg Oral BID Suzan Yanes MD     • finasteride  5 mg Oral Daily With Ijeoma Still MD     • furosemide  40 mg Oral BID (diuretic) Suzan Yanes MD     • heparin (porcine)  5,000 Units Subcutaneous [facility] Suzan Yanes MD     • insulin glargine  40 Units Subcutaneous Q12H Steffany Lala MD     • insulin lispro  1-5 Units Subcutaneous HS Suzan Yanes MD     • insulin lispro  1-6 Units Subcutaneous TID AC Suzan Yanes MD     • LORazepam  1 mg Intravenous Once PRN Kierra Kelley PA-C     • melatonin  3 mg Oral HS PRN Suzan Yanes MD     • metoclopramide  5 mg Oral Daily Suzan Yanes MD     • metroNIDAZOLE  500 mg Intravenous Mani Mota  mg (08/30/23 2815)   • midodrine  2.5 mg Oral TID Zack Mixon MD     • ondansetron  4 mg Intravenous Q6H PRN Suzan Yanes MD     • pantoprazole  40 mg Oral Early Morning Suzan Yanes MD     • polyethylene glycol  17 g Oral Daily Suzan Yanes MD     • tamsulosin  0.4 mg Oral Daily With Ijeoma tSill MD          Today, Patient Was Seen By: Kierra Kelley PA-C    ** Please Note: This note has been constructed using a voice recognition system.  **

## 2023-08-30 NOTE — ASSESSMENT & PLAN NOTE
Lab Results   Component Value Date    HGBA1C 6.7 (A) 02/02/2023     Recent Labs     08/29/23  1609 08/29/23  2111 08/30/23  0607 08/30/23  1054   POCGLU 137 117 136 160*   · Uses long-term use of insulin, with PAD and neuropathy  · Home regimen lantus 80u BID  · Will continue lantus- but lowered dose to due to 40 units BID given restricted diet in hospital  · Cont accuchecks and SSI

## 2023-08-30 NOTE — ASSESSMENT & PLAN NOTE
· H/o prior CABG  · Prescribd ASA, statin, Plavix: self-discontinued ASA/statin- restarted  · No evidence of acute ischemia  · Cont outpt follow up

## 2023-08-30 NOTE — UTILIZATION REVIEW
Initial Clinical Review    Admission: Date/Time/Statement:   Admission Orders (From admission, onward)     Ordered        08/29/23 1300  INPATIENT ADMISSION  Once                      Orders Placed This Encounter   Procedures   • INPATIENT ADMISSION     Fall risk, nwb right leg     Standing Status:   Standing     Number of Occurrences:   1     Order Specific Question:   Level of Care     Answer:   Med Surg [16]     Order Specific Question:   Estimated length of stay     Answer:   More than 2 Midnights     Order Specific Question:   Certification     Answer:   I certify that inpatient services are medically necessary for this patient for a duration of greater than two midnights. See H&P and MD Progress Notes for additional information about the patient's course of treatment. ED Arrival Information     Expected   -    Arrival   8/29/2023 10:30    Acuity   Urgent            Means of arrival   Walk-In    Escorted by   Self    Service   Hospitalist    Admission type   Emergency            Arrival complaint   wound check           Chief Complaint   Patient presents with   • Wound Check     Pt sent by podiatry for admission, has significant wound noted to R foot. Initial Presentation: 58 y.o. male presents to the ED from [address] [place] [address] with c/o poss infection or fracture of R calcaneous seen on office imaging. Chronic R heel diabetic ulcer also has purulent drainage, and there is concern for OM. PMH: L BKA, lymphedema, DM, PAD, CAD, chronic dHF, obesity, diabetic gastroparesis, orthostatic hypotension, PAD, HLD, and chronic R heel ulcer. Labs - mild leukocytosis, low Mag.  Imaging - Suspected calcaneal fractures , large plantar ulcer overlying posterior calcaneous. In ED dropped BP to 60s with transfer to [facility]. He is admitted to INPATIENT status with Diabetic ulcer R heel - IV antibiotics, IV Flagyl, MRI R foot. PAD = self-terminated home ASA, statin, restarting now.   Orthostatic hypotension - Monitor, used to take Midodrine, self-terminated. 8/29 Podiatry Consult - R plantar heel wound w/ c/f OM, IDDM, h/o L BKA, severe lymphedema RLE, PAD, ambulatory dysfunction - debrided at bedside, stable with negative probe to bone, no purulence. MRI R heel pending. Wound cultures sent, local wound care RLE, elevate. Date: 8/30   Day 2:   Pt is agreeable to ST rehab post d/c if needed. On exam today c/o lack of feeling in R foot, MRI negative for OM and abscess, + claustrophobis, requesting med to calm him, difficulty lying flat. Has edematous RLE. ED Triage Vitals   Temperature Pulse Respirations Blood Pressure SpO2   08/29/23 1049 08/29/23 1049 08/29/23 1049 08/29/23 1049 08/29/23 1049   98.1 °F (36.7 °C) 75 20 114/85 98 %      Temp Source Heart Rate Source Patient Position - Orthostatic VS BP Location FiO2 (%)   08/29/23 1049 08/29/23 1049 08/29/23 1049 08/29/23 1049 --   Oral Monitor Sitting Right arm       Pain Score       08/29/23 1351       2          Wt Readings from Last 1 Encounters:   08/30/23 118 kg (259 lb 4.2 oz)     Additional Vital Signs:   08/30/23 0726 97 °F (36.1 °C) Abnormal  67 17 136/57 79 97 % None (Room air) Lying   08/29/23 2305 98 °F (36.7 °C) 69 18 108/53 66 -- -- Sitting   08/29/23 2022 -- 77 -- 124/69 87 -- -- Sitting   08/29/23 1903 -- -- -- 100/60 -- -- -- Sitting   08/29/23 1850 -- -- -- 65/47 Abnormal  -- -- -- Sitting   08/29/23 1500 97.8 °F (36.6 °C) 83 18 157/72 -- 96 % None (Room air) Lying   08/29/23 1351 -- 81 18 176/82 Abnormal  -- 96 % None (Room air) Lying     Pertinent Labs/Diagnostic Test Results:     8/29 ECG - Normal sinus rhythm   Poor anterior R wave progression is noted   Nonspecific ST-t wave changes   Abnormal ECG    XR foot 3+ views RIGHT   ED Interpretation by Marti Garcia DPM (08/29 2236)   Wet read: I personally reviewed the xrays. There is no sign of acute osseous abnormality seen.  The radiolucent lines that were suspicious for calcaneal fracture on outpatient xrays today are more clearly seen to be consistent with soft tissue creases/density variations. Follow up final read      Final Result by Lore Wray MD (08/29 1414)      No acute osseous abnormality. Suspected calcaneal fractures appear to be related to superimposed linear crevices. Large plantar ulcer overlying the posterior calcaneus. Findings were noted and documented in the patient's epic notes by the referring physician. Workstation performed: [id number]         MRI ankle/heel right wo contrast      Mild, nonspecific plantar calcaneal marrow edema without specific findings of osteomyelitis.   No abscess. Results from last 7 days   Lab Units 08/29/23  1210   WBC Thousand/uL 10.54*   HEMOGLOBIN g/dL 13.2   HEMATOCRIT % 42.8   PLATELETS Thousands/uL 363   NEUTROS ABS Thousands/µL 7.86*         Results from last 7 days   Lab Units 08/29/23  1210   SODIUM mmol/L 135   POTASSIUM mmol/L 4.2   CHLORIDE mmol/L 97   CO2 mmol/L 31   ANION GAP mmol/L 7   BUN mg/dL 19   CREATININE mg/dL 1.03   EGFR ml/min/1.73sq m 76   CALCIUM mg/dL 9.4   MAGNESIUM mg/dL 1.8*     Results from last 7 days   Lab Units 08/29/23  1210   AST U/L 12*   ALT U/L 9   ALK PHOS U/L 49   TOTAL PROTEIN g/dL 8.2   ALBUMIN g/dL 3.6   TOTAL BILIRUBIN mg/dL 0.41     Results from last 7 days   Lab Units 08/30/23  1054 08/30/23  0607 08/29/23  2111 08/29/23  1609 08/29/23  1419   POC GLUCOSE mg/dl 160* 136 117 137 67     Results from last 7 days   Lab Units 08/29/23  1210   GLUCOSE RANDOM mg/dL 77     Results from last 7 days   Lab Units 08/29/23  1210   PROTIME seconds 14.0   INR  1.08   PTT seconds 29             Results from last 7 days   Lab Units 08/29/23  1210   LACTIC ACID mmol/L 1.4     Results from last 7 days   Lab Units 08/29/23  1210 08/29/23  1208 08/29/23  1130   BLOOD CULTURE  Received in Microbiology Lab. Culture in Progress. Received in Microbiology Lab. Culture in Progress.   --    Kirit Olsen STAIN RESULT   --   --  4+ Gram negative coccobacilli*  No polys seen*   WOUND CULTURE   --   --  3+ Growth of Gram Negative Viktor Enteric Like*     ED Treatment:   Medication Administration from 08/29/2023 1030 to 08/29/2023 1420     None        Past Medical History:   Diagnosis Date   • Anxiety    • Anxiety and depression    • Cataract    • Congestive cardiac failure ([facility])    • Coronary artery disease    • Depression    • Diabetes mellitus ([address])    • Diabetic autonomic neuropathy associated with type 2 diabetes mellitus ([facility])     Last Assessed: 12/28/2017   • History of DVT (deep vein thrombosis)     left LE   • Hyperlipidemia    • Lymphedema of right lower extremity    • Obesity    • Orthostatic hypotension      Present on Admission:  • Diabetic gastroparesis ([facility])  • Hyperlipidemia  • PAD (peripheral artery disease) ([facility])  • Orthostatic hypotension  • Chronic diastolic heart failure ([facility])  • Obstructive sleep apnea  • CAD (coronary artery disease)  • Diabetic ulcer of right heel ([facility])      Admitting Diagnosis: Visit for wound check [Z51.89]  Diabetic ulcer of right heel associated with type 2 diabetes mellitus, with necrosis of bone ([address]) [K80.557, L97.414]  Diabetic ulcer of right heel associated with type 2 diabetes mellitus, with fat layer exposed ([address]) [X96.010, L97.412]  Age/Sex: 58 y.o. male  Admission Orders:  Scheduled Medications:  aspirin, 81 mg, Oral, Daily  atorvastatin, 80 mg, Oral, Daily With Dinner  cefazolin, 2,000 mg, Intravenous, Q8H  clopidogrel, 75 mg, Oral, Daily  docusate sodium, 100 mg, Oral, BID  finasteride, 5 mg, Oral, Daily With Dinner  furosemide, 40 mg, Oral, BID (diuretic)  heparin (porcine), 5,000 Units, Subcutaneous, Q8H [facility]  insulin glargine, 40 Units, Subcutaneous, Q12H [facility]  insulin lispro, 1-5 Units, Subcutaneous, HS  insulin lispro, 1-6 Units, Subcutaneous, TID AC  metoclopramide, 5 mg, Oral, Daily  metroNIDAZOLE, 500 mg, Intravenous, Q8H  midodrine, 2.5 mg, Oral, TID AC  pantoprazole, 40 mg, Oral, Early Morning  polyethylene glycol, 17 g, Oral, Daily  tamsulosin, 0.4 mg, Oral, Daily With Dinner      Continuous IV Infusions:     PRN Meds:  acetaminophen, 650 mg, Oral, Q6H PRN  calcium carbonate, 1,000 mg, Oral, Daily PRN  Diclofenac Sodium, 2 g, Topical, BID PRN  LORazepam, 1 mg, Intravenous, Once PRN  melatonin, 3 mg, Oral, HS PRN  ondansetron, 4 mg, Intravenous, Q6H PRN    POC GLUCOSE AC/HS WITH SSI COVERAGE   Daily wt  MRI R ankle/heel  ADA diet  IP CONSULT TO PODIATRY  IP CONSULT TO CASE MANAGEMENT    Network Utilization Review Department  ATTENTION: Please call with any questions or concerns to [phone] and carefully listen to the prompts so that you are directed to the right person. All voicemails are confidential.  Tomasa Rodriguez all requests for admission clinical reviews, approved or denied determinations and any other requests to dedicated fax number below belonging to the campus where the patient is receiving treatment.  List of dedicated fax numbers for the Facilities:  [place] DENIALS (Administrative/Medical Necessity) [phone] [address] [facility] (Maternity/NICU/Pediatrics) [phone]   [address] [place] [address] Drive [phone]   [facility] [address] [facility] [phone]   [facility] [address] [facility] [id number] [place] [address] [phone]   [address] [facility] [address] [place] [address] [phone]

## 2023-08-30 NOTE — PLAN OF CARE
Problem: OCCUPATIONAL THERAPY ADULT  Goal: Performs self-care activities at highest level of function for planned discharge setting. See evaluation for individualized goals. Description: Treatment Interventions: ADL retraining, Functional transfer training, UE strengthening/ROM, Endurance training, Patient/family training, Equipment evaluation/education, Compensatory technique education, Continued evaluation, Activityengagement          See flowsheet documentation for full assessment, interventions and recommendations. Note: Limitation: Decreased ADL status, Decreased UE strength, Decreased Safe judgement during ADL, Decreased endurance, Decreased self-care trans, Decreased high-level ADLs  Prognosis: Good  Assessment: Pt is a 58 y.o. male seen for OT evaluation s/p adm to [facility] on 8/29/2023 w/ Diabetic ulcer of right heel ([address]) . Xray in office concerning for open nondisplaced fracture of body of R calcaneus. Per podiatry office xray concerning for osteomyelitis. MRI R ankle/heel pending. NWB R LE. Comorbidities affecting pt’s functional performance include a significant PMH of Anxiety, CAD, Depression, DM, DVT, HLD, Obesity. Pt with active OT orders and activity orders for Up and OOB as tolerated. Pt lives with spouse in a multi-level house with 3 VITOR and 1st floor setup. Pt has 1/2 bath on 1st floor and sleeps in lift chair. (+) home alone while spouse works. At baseline, pt was I w/ ADLs and functional transfers/mobility. Pt reports primarily performing SPTs to/from W/C w/ use of RW. Mod I for W/C mobility. (-) . Denies falls PTA.  Upon evaluation, pt currently requires Supervision for UB ADLs, Max-Mod A for LB ADLs, Max A for toileting, and Max A of 2 for transfers (able to achieve <25% of standing position, non-compliant to WBS) 2* the following deficits impacting occupational performance: decreased strength , decreased balance, decreased activity tolerance, limited functional reach, impaired sensation and decreased safety awareness. These impairments, as well at pt’s personal factors of: VITOR home environment, limited home support, difficulty performing ADLs, difficulty performing IADLs, difficulty performing transfers/mobility, limited insight into deficits, compliance, WBS, fall risk  and functional decline  limit pt’s ability to safely engage in all baseline areas of occupation. Pt to continue to benefit from continued acute OT services during hospital stay to address defined deficits and to maximize level of functional independence in the following Occupational Performance areas: grooming, bathing/shower, toilet hygiene, dressing, health maintenance, functional mobility, community mobility, clothing management and social participation. From OT standpoint, recommend STR upon D/C.  OT will continue to follow pt 3-5x/wk to address the following goals to  w/in 10-14 days:     OT Discharge Recommendation: Post acute rehabilitation services

## 2023-08-30 NOTE — ASSESSMENT & PLAN NOTE
Wt Readings from Last 3 Encounters:   08/30/23 118 kg (259 lb 4.2 oz)   07/31/23 118 kg (260 lb)   07/21/23 125 kg (275 lb 9.2 oz)   · Most recent Echo with LVEF 2/2020 with normal LVEF, grade 1 diastolic dysfunction  · Continue home lasix 40 mg bid

## 2023-08-30 NOTE — PHYSICAL THERAPY NOTE
PHYSICAL THERAPY NOTE          Patient Name: Arielle Irwin  BMRWU'N Date: 8/30/2023 08/30/23 1048   Note Type   Note type Evaluation   Pain Assessment   Pain Assessment Tool 0-10   Pain Score No Pain   Restrictions/Precautions   Weight Bearing Precautions Per Order Yes   RLE Weight Bearing Per Order NWB   Braces or Orthoses   (LLE prosthesis)   Other Precautions WBS; Fall Risk   Home Living   Type of [facility]  Two level;Performs ADLs on one level; Able to live on main level with bedroom/bathroom;1/2 bath on main level;Stairs to enter with rails  (3 VITOR)   [address]; Wheelchair-manual;Reacher   Additional Comments Sleeps on first floor in recliner/lift chair. Sponge bathes. Prior Function   Level of [place] Modified independent with wheelchair; Independent with ADLs; Needs assistance with IADLS   Lives With Spouse  (works full [id number] Help From Family   IADLs Family/Friend/Other provides transportation; Family/Friend/Other provides medication management; Family/Friend/Other provides meals  (light meal prep indp)   Falls in the last 6 months 0   Comments Pt mod I with WC. Stands and pivots with RW to transfer. General   Additional Pertinent History Admitted 8/29/23 for R diabetic heel ulcer pendign MRI. Up and OOB orders. PMH sig for L BKA, CHF, DM, hx DVT, PAD, and CAD. Cognition   Overall Cognitive Status WFL   Arousal/Participation Cooperative   Orientation Level Oriented X4   Memory Decreased recall of precautions   Following Commands Follows one step commands without difficulty   Subjective   Subjective "this would be a lot easier if I could just put my R toes on the ground."   RLE Assessment   RLE Assessment WFL   LLE Assessment   LLE Assessment WFL   Bed Mobility   Additional Comments Pt seated in bedside chair pre/post session.    Transfers   Sit to Stand 2  Maximal assistance Additional items Assist x 2;Armrests; Increased time required;Verbal cues; Other  (RW)   Stand to Sit 2  Maximal assistance   Additional items Assist x 2;Armrests; Increased time required;Verbal cues; Other  (RW)   Additional Comments Less than 25% of full stand postition. Cuing for sequencing, technique and WBS. Ambulation/Elevation   Gait pattern Not appropriate   Balance   Static Sitting Fair   Dynamic Sitting Fair   Static Standing Zero   Endurance Deficit   Endurance Deficit Yes   Endurance Deficit Description LE weakness   Activity Tolerance   Activity Tolerance Other (Comment); Patient limited by fatigue  (WBS non compliance)   Medical Staff Made Aware OT Thuy Muñoz PT Donna   Nurse Made Aware Megan LPN   Assessment   Prognosis Fair   Problem List Decreased strength;Decreased endurance; Impaired balance;Decreased mobility; Decreased safety awareness; Impaired judgement;Obesity; Decreased skin integrity; Impaired sensation   Assessment Nina Benjamin is a 58 y.o. male admitted to [address] [place] [address] on 8/29/2023 for Diabetic ulcer of right heel ([address] PT was consulted and pt was seen on 8/30/2023 for mobility assessment and d/c planning. Pt presents with high risk for falls, and R NWB status per podiatry. Pt is currently functioning at a maximum assistance x2 level for transfers. Pt demonstrated increased need for physical assist, impaired strength and activity intolerance upon initial evaluation. Trialed sit>stand with RW and Ax2 however pt unable to reach more than 25% of full standing position due to limited LLE strength. Pt did not demonstrate good compliance with RLE NWB, and was unable to complete stand without weightbearing despite pt education. Further mobility declined by pt due to fatigue and therapist limited by WBS compliance. Pt may benefit with sliding board transfers to maximize independence and limit WB in future session. At baseline, pt was mod I with mobility and transfers in manual WC.   Pt will benefit from continued skilled IP PT to address the above mentioned impairments  in order to maximize recovery and increase functional independence when completing mobility and ADLs. Currently PT recommendations for DME include sliding board. At this time PT recommendations for d/c are post acute rehab services. Barriers to Discharge Decreased caregiver support; Other (Comment)  (WBS non-compliance, inc physical assist)   Goals   Patient Goals to get stronger   STG Expiration Date 09/13/23   Short Term Goal #1 (1)  Perform all transfers with min A and appropriate technique (slide board vs pivot) 100% of the time to promote ability to safely negotiate home environment. (2) Able to self propel manual ' in order to increased community participation. (3) Improve overall strength and balance 1/2 grade to optimize ability to perform functional mobility, ADLs, IADLs and demonstrate reduced fall risk. (4) Increase activity tolerance to 45 minutes to promote endurance for completion of functional tasks. (5) Continue PT for ongoing pt/family education, DME needs, and d/c planning to promote highest level of function in least restricted environment. Plan   Treatment/Interventions Functional transfer training;LE strengthening/ROM; Therapeutic exercise; Endurance training;Patient/family training;Equipment eval/education; Bed mobility;Gait training;Spoke to nursing;OT;Compensatory technique education   PT Frequency 3-5x/wk   Recommendation   PT Discharge Recommendation Post acute rehabilitation services   Equipment Recommended Other (Comment)  (slide board)   AM-PAC Basic Mobility Inpatient   Turning in Flat Bed Without Bedrails 3   Lying on Back to Sitting on Edge of Flat Bed Without Bedrails 3   Moving Bed to Chair 1   Standing Up From Chair Using Arms 1   Walk in Room 1   Climb 3-5 Stairs With Railing 1   Basic Mobility Inpatient Raw Score 10    The patient's AM-PAC Basic Mobility Inpatient Short Form Raw Score is 10.  A Raw score of less than 16 suggests the patient may benefit from discharge to post-acute rehabilitation services. Please also refer to the recommendation of the Physical Therapist for safe discharge planning. Turning Head Towards Sound 4   Follow Simple Instructions 4   Low Function Basic Mobility Raw Score  18   Low Function Basic Mobility Standardized Score  29.25   Highest Level Of Mobility   JH-HLM Goal 4: Move to chair/commode   JH-HLM Achieved 3: Sit at edge of bed   End of Consult   Patient Position at End of Consult Bedside chair; All needs within reach       Factors contributing to complexity  History: co-morbidities, social background, fall risk, reliance on DME, WB status  Examination of body systems: MSK (strength, ROM), neuromuscular (sensation, balance, gait, transfers, motor function), functional (am-pac), integumentary  Clinical presentation: unpredictable  Complexity: high    Hazeline Ache, SPT

## 2023-08-30 NOTE — OCCUPATIONAL THERAPY NOTE
Occupational Therapy Evaluation     Patient Name: Michael Hopson  Today's Date: 8/30/2023  Problem List  Principal Problem:    Diabetic ulcer of right heel ([address])  Active Problems:    Type 2 diabetes mellitus with diabetic neuropathy, with long-term current use of insulin (HCC)    Hyperlipidemia    PAD (peripheral artery disease) (HCC)    S/P BKA (below knee amputation), left (HCC)    Orthostatic hypotension    Diabetic gastroparesis ([facility])    Class 2 severe obesity due to excess calories with serious comorbidity and body mass index (BMI) of 35.0 to 35.9 in [facility])    Chronic diastolic heart failure ([facility])    Obstructive sleep apnea    CAD (coronary artery disease)    Past Medical History  Past Medical History:   Diagnosis Date    Anxiety     Anxiety and depression     Cataract     Congestive cardiac failure (HCC)     Coronary artery disease     Depression     Diabetes mellitus ([address])     Diabetic autonomic neuropathy associated with type 2 diabetes mellitus ([address])     Last Assessed: 12/28/2017    History of DVT (deep vein thrombosis)     left LE    Hyperlipidemia     Lymphedema of right lower extremity     Obesity     Orthostatic hypotension      Past Surgical History  Past Surgical History:   Procedure Laterality Date    CORONARY ARTERY BYPASS GRAFT      EYE SURGERY      cataracts bilateral    LEG AMPUTATION THROUGH LOWER TIBIA AND FIBULA Left 8/3/2018    Procedure: AMPUTATION BELOW KNEE (BKA) L BKA;  Surgeon: Bertin Underwood MD;  [facility]: [facility];  Service: Vascular    ME CORONARY ARTERY BYP W/VEIN & ARTERY GRAFT 4 VEIN N/A 3/28/2018    Procedure: CORONARY ARTERY BYPASS GRAFT (CABG) x3 VESSELS, LIMA TO LAD, SVG--> PDA, SVG--> OM2,  RIGHT LEG EVH/SVH TO , INTRA-OP AMANDEEP;  Surgeon: Lili Walters MD;  [facility]: [facility];  Service: Cardiac Surgery    ROTATOR CUFF REPAIR Bilateral            08/30/23 1049   OT Last Visit   OT Visit Date 08/30/23   Note Type   Note type Evaluation   Pain Assessment   Pain Assessment Tool 0-10   Pain Score No Pain   Restrictions/Precautions   Weight Bearing Precautions Per Order Yes   RLE Weight Bearing Per Order NWB   Braces or Orthoses   (L LE prosthesis)   Other Precautions Fall Risk;WBS   Home Living   Type of [address] Two level;1/2 bath on main level;Performs ADLs on one level; Able to live on main level with bedroom/bathroom;Stairs to enter with rails  (3 VITOR)   Bathroom Shower/Tub   (Sponge bathes)   [address]; Wheelchair-manual;Reacher; Other (Comment)  (Lift chair- sleeps in lift chair at baseline)   Additional Comments Pt lives with spouse in a multi-level house with 3 VITOR and 1st floor setup. Pt has 1/2 bath on 1st floor and sleeps in lift chair. (+) home alone while spouse works. Prior Function   Level of [place] Modified independent with wheelchair; Independent with ADLs; Needs assistance with IADLS   Lives With Spouse  (works)   [address] Help From Family   IADLs Independent with meal prep; Family/Friend/Other provides meals; Family/Friend/Other provides medication management; Family/Friend/Other provides transportation  (I w/ light meal prep)   Falls in the last 6 months 0   Vocational On disability   Comments At baseline, pt was I w/ ADLs and functional transfers/mobility. Pt reports primarily performing SPTs to/from W/C w/ use of RW. Mod I for W/C mobility. (-) . Denies falls PTA. Lifestyle   Autonomy At baseline, pt was I w/ ADLs and functional transfers/mobility. Pt reports primarily performing SPTs to/from W/C w/ use of RW. Mod I for W/C mobility. (-) . Denies falls PTA.    Reciprocal Relationships Spouse   Service to Others On disability   Intrinsic Gratification Watching TV   ADL   Where Assessed Chair   Eating Assistance 7  Independent   Grooming Assistance 5  Supervision/Setup   UB Bathing Assistance 5  Supervision/Setup   LB Bathing Assistance 3  Moderate Assistance   [address] 5 Supervision/Setup   LB Dressing Assistance 2  Maximal Assistance   Toileting Assistance  2  Maximal Assistance   Functional Assistance 2  Maximal Assistance   Bed Mobility   Supine to Sit Unable to assess   Sit to Supine Unable to assess   Additional Comments Pt seated OOB in chair at start/end of session. Call bell and phone within reach. All needs met and pt reports no further questions for OT at this time. Transfers   Sit to Stand 2  Maximal assistance   Additional items Assist x 2;Armrests; Increased time required;Verbal cues   Stand to Sit 2  Maximal assistance   Additional items Assist x 2;Armrests; Increased time required;Verbal cues   Additional Comments Pt able to achieve <25% of standing position w/ decreased compliance to WBS. Further sit>stand transfers deferred 2* noncompliance to WBS. Educated pt on sliding board transfers, however pt declining trial w/ sliding board during evaluation   Balance   Static Sitting Good   Dynamic Sitting Fair   Static Standing Zero   Activity Tolerance   Activity Tolerance Patient limited by fatigue; Other (Comment)  (WBS)   Medical Staff Made Aware Zeferino Waller PT. Zenia Wiley, [facility]   Nurse Made Aware yes; APOLONIA Guzman   RUE Assessment   RUE Assessment WFL  (4/5 throughout)   LUE Assessment   LUE Assessment WFL  (4/5 throughout)   Hand Function   Gross Motor Coordination Functional   Fine Motor Coordination Functional   Sensation   Light Touch Partial deficits in the RLE;Partial deficits in the LLE;Partial deficits in the RUE;Partial deficits in the LUE   Proprioception   Proprioception No apparent deficits   Vision - Complex Assessment   Acuity Able to read clock/calendar on wall without difficulty   Psychosocial   Psychosocial (WDL) WDL   Perception   Inattention/Neglect Appears intact   Cognition   Overall Cognitive Status WFL   Arousal/Participation Alert; Cooperative   Attention Attends with cues to redirect   Orientation Level Oriented X4   Memory Decreased recall of precautions Following Commands Follows one step commands without difficulty   Comments Pt w/ limited insight into deficits   Assessment   Limitation Decreased ADL status; Decreased UE strength;Decreased Safe judgement during ADL;Decreased endurance;Decreased self-care trans;Decreased high-level ADLs   Prognosis Good   Assessment Pt is a 58 y.o. male seen for OT evaluation s/p adm to [address] [facility] on 8/29/2023 w/ Diabetic ulcer of right heel ([address]) . Xray in office concerning for open nondisplaced fracture of body of R calcaneus. Per podiatry office xray concerning for osteomyelitis. MRI R ankle/heel pending. NWB R LE. Comorbidities affecting pt’s functional performance include a significant PMH of Anxiety, CAD, Depression, DM, DVT, HLD, Obesity. Pt with active OT orders and activity orders for Up and OOB as tolerated. Pt lives with spouse in a multi-level house with 3 VITOR and 1st floor setup. Pt has 1/2 bath on 1st floor and sleeps in lift chair. (+) home alone while spouse works. At baseline, pt was I w/ ADLs and functional transfers/mobility. Pt reports primarily performing SPTs to/from W/C w/ use of RW. Mod I for W/C mobility. (-) . Denies falls PTA. Upon evaluation, pt currently requires Supervision for UB ADLs, Max-Mod A for LB ADLs, Max A for toileting, and Max A of 2 for transfers (able to achieve <25% of standing position, non-compliant to WBS) 2* the following deficits impacting occupational performance: decreased strength , decreased balance, decreased activity tolerance, limited functional reach, impaired sensation and decreased safety awareness. These impairments, as well at pt’s personal factors of: VITOR home environment, limited home support, difficulty performing ADLs, difficulty performing IADLs, difficulty performing transfers/mobility, limited insight into deficits, compliance, WBS, fall risk  and functional decline  limit pt’s ability to safely engage in all baseline areas of occupation.  Pt to continue to benefit from continued acute OT services during hospital stay to address defined deficits and to maximize level of functional independence in the following Occupational Performance areas: grooming, bathing/shower, toilet hygiene, dressing, health maintenance, functional mobility, community mobility, clothing management and social participation. From OT standpoint, recommend STR upon D/C. OT will continue to follow pt 3-5x/wk to address the following goals to  w/in 10-14 days:   Goals   Patient Goals To get stronger   LTG Time Frame 10-14   Long Term Goal Please refer to LTGs listed below   Plan   Treatment Interventions ADL retraining;Functional transfer training;UE strengthening/ROM; Endurance training;Patient/family training;Equipment evaluation/education; Compensatory technique education;Continued evaluation; Activityengagement   Goal Expiration Date 23   OT Treatment Day 0   OT Frequency 3-5x/wk   Recommendation   OT Discharge Recommendation Post acute rehabilitation services   Additional Comments  The patient's raw score on the AM-PAC Daily Activity Inpatient Short Form is 16. A raw score of less than 19 suggests the patient may benefit from discharge to post-acute rehabilitation services. Please refer to the recommendation of the Occupational Therapist for safe discharge planning.    AM-PAC Daily Activity Inpatient   Lower Body Dressing 2   Bathing 2   Toileting 2   Upper Body Dressing 3   Grooming 3   Eating 4   Daily Activity Raw Score 16   Daily Activity Standardized Score (Calc for Raw Score >=11) 35.96   AM-PAC Applied Cognition Inpatient   Following a Speech/Presentation 4   Understanding Ordinary Conversation 4   Taking Medications 3   Remembering Where Things Are Placed or Put Away 4   Remembering List of 4-5 Errands 3   Taking Care of Complicated Tasks 3   Applied Cognition Raw Score 21   Applied Cognition Standardized Score 44.3       GOALS    Pt will improve activity tolerance to G for min 30 min txment sessions for increase engagement in functional tasks    Pt will complete bed mobility at a Mod I level w/ G balance/safety demonstrated to decrease caregiver assistance required     Pt will complete UB dressing/self care w/ mod I using adaptive device and DME as needed     Pt will complete LB dressing/self care w/ mod I using adaptive device and DME as needed    Pt will complete toileting w/ mod I w/ G hygiene/thoroughness using DME as needed    Pt will improve functional transfers to Min A on/off all surfaces using DME as needed w/ G balance/safety     Pt will be attentive 100% of the time during ongoing cognitive assessment w/ G participation to assist w/ safe d/c planning/recommendations    Pt will demonstrate G carryover of pt/caregiver education and training as appropriate w/o cues w/ good tolerance to increase safety during functional tasks    Pt will increase BUE strength by 1MM grade via AROM/AAROM/PROM exercises to increase independence in ADLs and transfers    Pt will verbalize 3 potential fall hazards and identify appropriate compensatory techniques to decrease fall risk in home environment     Pt will increase standing tolerance to 2-3 mins with Poor+ dynamic standing balance to increase safety during participation in ADLs     Pt will demonstrate 100% adherence to WBS during all functional activities w/o cues from therapist       Raya Kothari OTR/L

## 2023-08-31 LAB
ANION GAP SERPL CALCULATED.3IONS-SCNC: 5 MMOL/L
BACTERIA SPEC ANAEROBE CULT: ABNORMAL
BUN SERPL-MCNC: 17 MG/DL (ref 5–25)
CALCIUM SERPL-MCNC: 8.8 MG/DL (ref 8.4–10.2)
CHLORIDE SERPL-SCNC: 97 MMOL/L (ref 96–108)
CO2 SERPL-SCNC: 33 MMOL/L (ref 21–32)
CREAT SERPL-MCNC: 1.08 MG/DL (ref 0.6–1.3)
ERYTHROCYTE [DISTWIDTH] IN BLOOD BY AUTOMATED COUNT: 13.5 % (ref 11.6–15.1)
GFR SERPL CREATININE-BSD FRML MDRD: 72 ML/MIN/1.73SQ M
GLUCOSE SERPL-MCNC: 102 MG/DL (ref 65–140)
GLUCOSE SERPL-MCNC: 104 MG/DL (ref 65–140)
GLUCOSE SERPL-MCNC: 124 MG/DL (ref 65–140)
GLUCOSE SERPL-MCNC: 129 MG/DL (ref 65–140)
GLUCOSE SERPL-MCNC: 149 MG/DL (ref 65–140)
HCT VFR BLD AUTO: 40.1 % (ref 36.5–49.3)
HGB BLD-MCNC: 12.2 G/DL (ref 12–17)
MCH RBC QN AUTO: 26.2 PG (ref 26.8–34.3)
MCHC RBC AUTO-ENTMCNC: 30.4 G/DL (ref 31.4–37.4)
MCV RBC AUTO: 86 FL (ref 82–98)
PLATELET # BLD AUTO: 333 THOUSANDS/UL (ref 149–390)
PMV BLD AUTO: 9 FL (ref 8.9–12.7)
POTASSIUM SERPL-SCNC: 4.1 MMOL/L (ref 3.5–5.3)
RBC # BLD AUTO: 4.65 MILLION/UL (ref 3.88–5.62)
SODIUM SERPL-SCNC: 135 MMOL/L (ref 135–147)
WBC # BLD AUTO: 8.3 THOUSAND/UL (ref 4.31–10.16)

## 2023-08-31 PROCEDURE — 97530 THERAPEUTIC ACTIVITIES: CPT

## 2023-08-31 PROCEDURE — 80048 BASIC METABOLIC PNL TOTAL CA: CPT | Performed by: PHYSICIAN ASSISTANT

## 2023-08-31 PROCEDURE — 97535 SELF CARE MNGMENT TRAINING: CPT

## 2023-08-31 PROCEDURE — 85027 COMPLETE CBC AUTOMATED: CPT | Performed by: PHYSICIAN ASSISTANT

## 2023-08-31 PROCEDURE — 82948 REAGENT STRIP/BLOOD GLUCOSE: CPT

## 2023-08-31 PROCEDURE — 99232 SBSQ HOSP IP/OBS MODERATE 35: CPT | Performed by: PODIATRIST

## 2023-08-31 PROCEDURE — 99232 SBSQ HOSP IP/OBS MODERATE 35: CPT | Performed by: PHYSICIAN ASSISTANT

## 2023-08-31 RX ORDER — FINASTERIDE 5 MG/1
5 TABLET, FILM COATED ORAL DAILY
Qty: 100 TABLET | Refills: 2 | Status: SHIPPED | OUTPATIENT
Start: 2023-08-31

## 2023-08-31 RX ADMIN — MIDODRINE HYDROCHLORIDE 2.5 MG: 5 TABLET ORAL at 07:04

## 2023-08-31 RX ADMIN — ATORVASTATIN CALCIUM 80 MG: 40 TABLET, FILM COATED ORAL at 16:08

## 2023-08-31 RX ADMIN — METRONIDAZOLE 500 MG: 500 INJECTION, SOLUTION INTRAVENOUS at 06:05

## 2023-08-31 RX ADMIN — CEFAZOLIN SODIUM 2000 MG: 2 SOLUTION INTRAVENOUS at 22:34

## 2023-08-31 RX ADMIN — FINASTERIDE 5 MG: 5 TABLET, FILM COATED ORAL at 16:08

## 2023-08-31 RX ADMIN — CEFAZOLIN SODIUM 2000 MG: 2 SOLUTION INTRAVENOUS at 16:12

## 2023-08-31 RX ADMIN — HEPARIN SODIUM 5000 UNITS: 5000 INJECTION INTRAVENOUS; SUBCUTANEOUS at 05:04

## 2023-08-31 RX ADMIN — METRONIDAZOLE 500 MG: 500 INJECTION, SOLUTION INTRAVENOUS at 21:31

## 2023-08-31 RX ADMIN — METOCLOPRAMIDE 5 MG: 5 TABLET ORAL at 08:26

## 2023-08-31 RX ADMIN — FUROSEMIDE 40 MG: 40 TABLET ORAL at 16:08

## 2023-08-31 RX ADMIN — CEFAZOLIN SODIUM 2000 MG: 2 SOLUTION INTRAVENOUS at 07:04

## 2023-08-31 RX ADMIN — HEPARIN SODIUM 5000 UNITS: 5000 INJECTION INTRAVENOUS; SUBCUTANEOUS at 15:06

## 2023-08-31 RX ADMIN — PANTOPRAZOLE SODIUM 40 MG: 40 TABLET, DELAYED RELEASE ORAL at 05:04

## 2023-08-31 RX ADMIN — TAMSULOSIN HYDROCHLORIDE 0.4 MG: 0.4 CAPSULE ORAL at 16:08

## 2023-08-31 RX ADMIN — MIDODRINE HYDROCHLORIDE 2.5 MG: 5 TABLET ORAL at 11:37

## 2023-08-31 RX ADMIN — FUROSEMIDE 40 MG: 40 TABLET ORAL at 08:26

## 2023-08-31 RX ADMIN — METRONIDAZOLE 500 MG: 500 INJECTION, SOLUTION INTRAVENOUS at 15:06

## 2023-08-31 RX ADMIN — HEPARIN SODIUM 5000 UNITS: 5000 INJECTION INTRAVENOUS; SUBCUTANEOUS at 21:31

## 2023-08-31 RX ADMIN — CLOPIDOGREL BISULFATE 75 MG: 75 TABLET ORAL at 08:26

## 2023-08-31 RX ADMIN — ASPIRIN 81 MG: 81 TABLET, COATED ORAL at 08:26

## 2023-08-31 RX ADMIN — INSULIN GLARGINE 40 UNITS: 100 INJECTION, SOLUTION SUBCUTANEOUS at 08:25

## 2023-08-31 RX ADMIN — MIDODRINE HYDROCHLORIDE 2.5 MG: 5 TABLET ORAL at 16:08

## 2023-08-31 RX ADMIN — ONDANSETRON 4 MG: 2 INJECTION INTRAMUSCULAR; INTRAVENOUS at 06:04

## 2023-08-31 RX ADMIN — INSULIN GLARGINE 40 UNITS: 100 INJECTION, SOLUTION SUBCUTANEOUS at 21:31

## 2023-08-31 NOTE — UTILIZATION REVIEW
Date: 8/31  Day 3: Has surpassed a 2nd midnight with active treatments and services, which include treatment of OM , abx therapy

## 2023-08-31 NOTE — PLAN OF CARE
Problem: Potential for Falls  Goal: Patient will remain free of falls  Description: INTERVENTIONS:  - Educate patient/family on patient safety including physical limitations  - Instruct patient to call for assistance with activity   - Consult OT/PT to assist with strengthening/mobility   - Keep Call bell within reach  - Keep bed low and locked with side rails adjusted as appropriate  - Keep care items and personal belongings within reach  - Initiate and maintain comfort rounds  - Make Fall Risk Sign visible to staff  - Offer Toileting every 2 Hours, in advance of need  - Initiate/Maintain chair alarm  - Apply yellow socks and bracelet for high fall risk patients  - Consider moving patient to room near nurses station  Outcome: Progressing     Problem: Prexisting or High Potential for Compromised Skin Integrity  Goal: Skin integrity is maintained or improved  Description: INTERVENTIONS:  - Identify patients at risk for skin breakdown  - Assess and monitor skin integrity  - Assess and monitor nutrition and hydration status  - Monitor labs   - Assess for incontinence   - Turn and reposition patient  - Assist with mobility/ambulation  - Relieve pressure over bony prominences  - Avoid friction and shearing  - Provide appropriate hygiene as needed including keeping skin clean and dry  - Evaluate need for skin moisturizer/barrier cream  - Collaborate with interdisciplinary team   - Patient/family teaching  - Consider wound care consult   Outcome: Progressing     Problem: MOBILITY - ADULT  Goal: Maintain or return to baseline ADL function  Description: INTERVENTIONS:  -  Assess patient's ability to carry out ADLs; assess patient's baseline for ADL function and identify physical deficits which impact ability to perform ADLs (bathing, care of mouth/teeth, toileting, grooming, dressing, etc.)  - Assess/evaluate cause of self-care deficits   - Assess range of motion  - Assess patient's mobility; develop plan if impaired  - Assess patient's need for assistive devices and provide as appropriate  - Encourage maximum independence but intervene and supervise when necessary  - Involve family in performance of ADLs  - Assess for home care needs following discharge   - Consider OT consult to assist with ADL evaluation and planning for discharge  - Provide patient education as appropriate  Outcome: Progressing

## 2023-08-31 NOTE — ASSESSMENT & PLAN NOTE
· Pt follows with vascular surgery for PAD  · LEADS 4/23 with "right 50-70% proximal popliteal stenosis"  · H/o prior left BKA in 2018  · Was prescribed ASA, plavix, statin  · Pt self-discontinued ASA/statin- resumed here

## 2023-08-31 NOTE — OCCUPATIONAL THERAPY NOTE
Occupational Therapy Progress Note     Patient Name: Angélica Alaniz  Today's Date: 8/31/2023  Problem List  Principal Problem:    Diabetic ulcer of right heel ([address])  Active Problems:    Type 2 diabetes mellitus with diabetic neuropathy, with long-term current use of insulin ([facility])    Hyperlipidemia    PAD (peripheral artery disease) ([facility])    S/P BKA (below knee amputation), left ([facility])    Orthostatic hypotension    Diabetic gastroparesis ([facility])    Class 2 severe obesity due to excess calories with serious comorbidity and body mass index (BMI) of 35.0 to 35.9 in [facility])    Chronic diastolic heart failure ([facility])    Obstructive sleep apnea    CAD (coronary artery disease)            08/31/23 4260   Note Type   Note Type Treatment   Pain Assessment   Pain Assessment Tool FLACC   Pain Rating: FLACC (Rest) - Face 0   Pain Rating: FLACC (Rest) - Legs 0   Pain Rating: FLACC (Rest) - Activity 0   Pain Rating: FLACC (Rest) - Cry 1   Pain Rating: FLACC (Rest) - Consolability 0   Score: FLACC (Rest) 1   Restrictions/Precautions   Weight Bearing Precautions Per Order Yes   RLE Weight Bearing Per Order NWB   Other Precautions WBS; Fall Risk  (orthostatic hypotension)   ADL   Where Assessed Chair   LB Dressing Assistance 2  Maximal Assistance   LB Dressing Deficit Thread RLE into underwear; Thread LLE into underwear   Functional Standing Tolerance   Time 30-40secs   Transfers   Sit to Stand 2  Maximal assistance   Additional items Assist x 3   Stand to Sit 2  Maximal assistance   Additional items Assist x 3   Sliding Board transfer 2  Maximal assistance   Additional items Assist x 3; Increased time required;Verbal cues   Functional Mobility   Functional Mobility   (recommend hoyerlift for return to bed with nsg)   Therapeutic Excerise-Strength   UE Strength Yes  (w/c pushups, 1set, 5 reps)   Subjective   Subjective "I just had a hardtime not putting weight on that foot."   Cognition   Overall Cognitive Status [facility] Arousal/Participation Alert   Following Commands Follows one step commands without difficulty   Activity Tolerance   Activity Tolerance Patient limited by fatigue;Patient limited by pain   Medical Staff Made Aware nsg, P.T. Assessment   Assessment Pt seen for 53min tx sesssion with focus on functional balance, functional mobility, ADL status, transfer safety, b/l UE strengthening, and education. Pt able to tolerate OOB mobility; sitting balance=f+/f, standing balance=p/p-. Pt required heavy assistance with all functional mobility tasks, including sliding board transfers. Pt demonstrating need for assistance with LE ADLs. Orthostatic hypotension noted with functional mobility tasks. Pt continues to demonstrate appropriateness for inpt rehab to improve his overall level of independence and improve his compliance with his R LE NWB status. Pt pleasant and cooperative with tx session. Will continue. Plan   Treatment Interventions ADL retraining;Functional transfer training;UE strengthening/ROM; Endurance training;Cognitive reorientation;Patient/family training;Equipment evaluation/education;Continued evaluation; Fine motor coordination activities; Compensatory technique education   Goal Expiration Date 09/13/23   OT Treatment Day 1   OT Frequency 3-5x/wk   Recommendation   OT Discharge Recommendation Post acute rehabilitation services   AM-PAC Daily Activity Inpatient   Lower Body Dressing 2   Bathing 2   Toileting 2   Upper Body Dressing 4   Grooming 4   Eating 4   Daily Activity Raw Score 18   Daily Activity Standardized Score (Calc for Raw Score >=11) 38.66   AM-PAC Applied Cognition Inpatient   Following a Speech/Presentation 4   Understanding Ordinary Conversation 4   Taking Medications 3   Remembering Where Things Are Placed or Put Away 4   Remembering List of 4-5 Errands 4   Taking Care of Complicated Tasks 3   Applied Cognition Raw Score 22   Applied Cognition Standardized Score 47.83     [place] Terrie Patricia

## 2023-08-31 NOTE — ASSESSMENT & PLAN NOTE
Lab Results   Component Value Date    HGBA1C 6.7 (A) 02/02/2023     Recent Labs     08/30/23  1534 08/30/23  2125 08/31/23  0630 08/31/23  1050   POCGLU 118 157* 104 129   · Uses long-term use of insulin, with PAD and neuropathy  · Home regimen lantus 80u BID  · Will continue lantus- but lowered dose to due to 40 units BID given restricted diet in hospital  · Will decrease to 35 units BID to avoid hypoglycemia  · Cont accuchecks and SSI

## 2023-08-31 NOTE — PLAN OF CARE
Problem: PHYSICAL THERAPY ADULT  Goal: Performs mobility at highest level of function for planned discharge setting. See evaluation for individualized goals. Description: Treatment/Interventions: Functional transfer training, LE strengthening/ROM, Therapeutic exercise, Endurance training, Patient/family training, Equipment eval/education, Bed mobility, Gait training, Spoke to nursing, OT, Compensatory technique education  Equipment Recommended: Other (Comment) (slide board)       See flowsheet documentation for full assessment, interventions and recommendations. Outcome: Progressing  Note: Prognosis: Fair  Problem List: Decreased strength, Decreased endurance, Impaired balance, Decreased mobility, Decreased safety awareness, Impaired judgement, Obesity, Decreased skin integrity, Impaired sensation  Assessment: Michael Hopson is a 58 y.o. male admitted to [address] [place] [address] on 8/29/2023 for Diabetic ulcer of right heel ([address]). Pt seen today for PT treatment session to improve functional mobility and progress toward stated goals. Pt demonstrated improved progress with functional transfer training. Preformed sit>stand transfer this session, with pt able to reach full standing position. Did require max assist x2 to transfer with additional Ax1 for RLE management to maintain NWB and cuing for technique. Performed 2 sliding board transfers from bedside chair <> WC with max assist x2 and continued additional Ax1 RLE management. Did require cuing for appropriate hand placement and technique but was able to use UE strength to complete with maintained WBS. Noted hypotension with mobility (see flowsheet), but recovered with seated rest. Despite improvements, pt still demonstrates impaired mobility, need for physical assist, decreased strength, balance deficits and activity intolerace.  Pt will continue to benefit from skilled IP PT to address stated impairments and  in order to maximize recovery and increase functional independence when completing mobility and ADLs. PT recommendations for d/c remain post acute rehab services. Barriers to Discharge: Decreased caregiver support, Other (Comment) (increased physical assist, WBS)     PT Discharge Recommendation: Post acute rehabilitation services    See flowsheet documentation for full assessment.

## 2023-08-31 NOTE — APP STUDENT NOTE
RAYMUNDO STUDENT  Inpatient Progress Note for TRAINING ONLY  Not Part of Legal Medical Record       Progress Note - Kasie Walker 58 y.o. male MRN: [id number]    Unit/Bed#: E2 -01 Encounter: [id number]      Assessment and Plan  1. Diabetic ulcer on right heel  · Patient was referred by outpatient Podiatrist with concerns of osteomyelitis due to concerning diabetic ulcer of the calcaneous. · Patient has been noncompliant with taking at home regimen of Aspirin 81 mg and Atorvastatin 80 mg daily regarding his diagnosis of PAD. · Xray images negative for acute fracture  · MRI images negative for osteomyelitis findings   · Finish IVPB Cefazolin 2,000mg 50 mL q8h and switch to oral 7 day course. · Wound culture and gram stain results with growth of Escherichia coli and Proteus mirabilis  · Case management is working on admission to a short-term rehabilitation facility for further wound care. · Continue follow up with out patient podiatry, Dr. Geetha Hudson. 2. Type 2 Diabetes Mellitus Neuropathy with long term current use of insulin  · While in hospital continue monitoring serum blood sugar. · Will continue Lantus 35u BID during hospitalization. · Continue monitoring for s/s of hypoglycemia. · Upon discharge will continue Lantus 80u BID. 3. PAD  · Patient was evaluated by vascular surgery during prior admission. · Findings in prior venous duplex, 04/18/23, revealed 50-70% stenosis of the proximal popliteal artery. · History of Left BKA in 2018. · Patient was prescribed Aspirin 81 mg, Clopidogrel 75 mg, Atorvastatin 80 mg for PAD during last admission 04/2023. · Patient states that he was noncompliant to aspirin, plavix, and statin. · Resume aspirin, plavix, and statin. Encourage compliance. 4. Hyperlipidemia  · Patient was noncompliant with statin use. · Resume Atorvastatin 80mg daily. Encourage compliance after discharge.    5. Orthostatic hypotension  · Patient was prescribed midodrine 2.5 mg TID before meals. · Patient claims that since he is nonambulatory, he had stopped taking the medication at home. · Midodrine 2.5 mg was resume on admission as patient experience an hypotensive event when trying to use the commode. · BP readin/47 HHmg. · Continue advising slow movements and caution with standing. 6. Chronic diastolic heart failure  · Continue home regimen of Lasix 40 mg BID   · Continue monitoring weight at home. · / - 259 lb, 4.2 oz   ·  - 260 lbs  · / - 275 lb 9.2 oz   · Most recent ECHO 2020 revealed normal LVEF, grade 1 dysfunction. 8. BKA, left   · Patient wears left leg prosthesis   · Will be attending short term rehabilitation, case management consulted. 9. Class 2 severe obesity  · Encourage life style modifications   · Nutrition consult    Subjective:   Patient is a 59 yo male who was admitted to the hospital in regards to a diabetic wound ulcer on the right calcaneous. Patient was visiting outpatient podiatry, Dr. Kristi Villeda expressed concerns of osteomyelitis and recommended the patient to be admitted. Patient is a Type 2 diabetic with long term insulin use, PAD, Hyperlipidemia, Orthostatic hypotension, and left BKA. Patient has not been compliant with at home medication regimens of aspirin, stain, plavix. Patient today is aware that his MRI results reveal no findings of osteomyelitis. Patient is compliant with the podiatrist recommendation for inpatient short term rehabilitation. He states that he is unable to complete ADL's and care for his wound. He states he has great difficulty balancing and bearing weight onto his left leg due to his prosthesis. Thus he has difficulty keeping pressure off of his right heel. Patient expresses micky that there are negative findings for osteomyelitis as he states that is how he lost his left leg. He denies any pain or parasethesias to the RLE. He denies difficulty passing a BM. He denies changes in BM.  He states that he typically has a BM every 3-4 days. Patient denies dysuria, however claims difficulty "pushing" the urine out sometimes. Says that he has always experienced this but hesitant to follow up with his PCP or urologist due to possibility of prostate cancer. Patient denies chest pains, shortness of breath, and pain. Patient states that he prefers sleeping in a recliner as he gets short of breathe when laying flat. Objective:     Vitals: Blood pressure 127/56, pulse 72, temperature (!) 97.2 °F (36.2 °C), temperature source Temporal, resp. rate 18, weight 118 kg (259 lb 4.2 oz), SpO2 94 %. ,Body mass index is 34.21 kg/m². Intake/Output Summary (Last 24 hours) at 8/31/2023 1324  Last data filed at 8/31/2023 6060  Gross per 24 hour   Intake --   Output 1050 ml   Net -1050 ml       Physical Exam: {Exam, Complete:01750}   General Appearance:   - awake   - sitting in recliner  - no acute distress  - well groomed     Skin:  - scaly LE    Cardiovascular  - normal rate and rhythm     Pulmonary   - CTA bilaterally   - normal effort    Abdomen  - normal bowel sounds  - round and hard   - nontender to palpitation     Musculoskeletal  - Left leg prosthesis   - RLE edema, no pitting  - RLE distal erythema  - Right malleolus and calcaneous wrapped and bandaged   - no sensation to RLE.     Neurological  - AAOx3     Psychiatric  - mood: normal   - affect: normal     Invasive Devices     Peripheral Intravenous Line  Duration           Peripheral IV 08/29/23 Left Antecubital 2 days                Catalina CONCEPCION

## 2023-08-31 NOTE — ARC ADMISSION
Referral received for admission to [facility]. Reviewed with ALEX SEGUNDO and patient is denied for acute secondary to no medical necessity. Recommend referral to TCF/SNF. CM updated in aidin regarding same.

## 2023-08-31 NOTE — CASE MANAGEMENT
Case Management Discharge Planning Note    Patient name Randy Murray [facility] /E2 [address] [facility] [place] 64-* MRN [id number]  : 1960 Date 2023       Current Admission Date: 2023  Current Admission Diagnosis:Diabetic ulcer of right heel [facility])   Patient Active Problem List    Diagnosis Date Noted   • Diabetic ulcer of right heel ([address]) 2023   • Lymphedema in adult patient 2023   • Fracture, toe 2023   • Cellulitis of right ankle 2023   • Non-pressure chronic ulcer of right calf with fat layer exposed ([address]) 2023   • Other acute osteomyelitis, left ankle and foot ([address]) 10/29/2020   • Embolism and thrombosis of arteries of the lower extremities ([address]) 10/29/2020   • Lymphedema of right lower extremity 10/29/2020   • Venous stasis dermatitis of right lower extremity 10/29/2020   • Abdominal distension 10/29/2020   • 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   • Nodular rash 2020   • CAD (coronary artery disease) 2019   • Amputated below knee, left ([address]) 10/04/2018   • Phantom limb syndrome without pain ([address]) 10/04/2018   • Obstructive sleep apnea 2018   • Hyponatremia 2018   • Diabetic autonomic neuropathy associated with type 2 diabetes mellitus ([address]) 2018   • S/P CABG x 3 2018   • Other constipation 2018   • Chronic diastolic heart failure ([address]) 2018   • Hypertensive heart disease with congestive heart failure (HCC)    • Triple vessel coronary artery disease 2018   • Diabetic gastroparesis ([address]) 2018   • Class 2 severe obesity due to excess calories with serious comorbidity and body mass index (BMI) of 35.0 to 35.9 in adult ([address]) 2018   • Orthostatic hypotension 2018   • PAD (peripheral artery disease) ([address]) 2018   • S/P BKA (below knee amputation), left ([address]) 2018   • Anxiety and depression 2017   • 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 12/28/2017   • Vitamin D deficiency 09/20/2016   • Hyperlipidemia 02/11/2015   • Type 2 diabetes mellitus with diabetic neuropathy, with long-term current use of insulin ([address]) 11/06/2014      LOS (days): 2  Geometric Mean LOS (GMLOS) (days): 5.40  Days to GMLOS:3.5     OBJECTIVE:  Risk of Unplanned Readmission Score: 14.12         Current admission status: Inpatient   Preferred Pharmacy:   [facility] [address] RAZIA PEREIRA [address], [address]  [place] [address]  Phone: [phone] Fax: [phone]    [facility] ([address] [place] [address] Cross[place] [address]  Phone: [phone] Fax: [phone]    [facility] ([facility]) - [place] [address]  Phone: [phone] Fax: [phone]    Primary Care Provider: Chris Londono DO    Primary Insurance: [facility]  Secondary Insurance:     DISCHARGE DETAILS:                                                                                                 Additional Comments: Previous note reports home not being handicap accessible. Pt does have wheelchair which he uses in the home. He also has a lift chair which he sleeps on on the main level of the home. The previous note stating "not handicap accessible" was due to pt have a two story home and not getting to the second level, but he is able to complete daily living activities on 1st level of the home.

## 2023-08-31 NOTE — PLAN OF CARE
Problem: MOBILITY - ADULT  Goal: Maintain or return to baseline ADL function  Description: INTERVENTIONS:  -  Assess patient's ability to carry out ADLs; assess patient's baseline for ADL function and identify physical deficits which impact ability to perform ADLs (bathing, care of mouth/teeth, toileting, grooming, dressing, etc.)  - Assess/evaluate cause of self-care deficits   - Assess range of motion  - Assess patient's mobility; develop plan if impaired  - Assess patient's need for assistive devices and provide as appropriate  - Encourage maximum independence but intervene and supervise when necessary  - Involve family in performance of ADLs  - Assess for home care needs following discharge   - Consider OT consult to assist with ADL evaluation and planning for discharge  - Provide patient education as appropriate  8/31/2023 1259 by aHzel Parker RN  Outcome: Progressing  8/31/2023 1259 by Hazel Parker RN  Outcome: Progressing     Problem: PAIN - ADULT  Goal: Verbalizes/displays adequate comfort level or baseline comfort level  Description: Interventions:  - Encourage patient to monitor pain and request assistance  - Assess pain using appropriate pain scale  - Administer analgesics based on type and severity of pain and evaluate response  - Implement non-pharmacological measures as appropriate and evaluate response  - Consider cultural and social influences on pain and pain management  - Notify physician/advanced practitioner if interventions unsuccessful or patient reports new pain  Outcome: Progressing     Problem: Prexisting or High Potential for Compromised Skin Integrity  Goal: Skin integrity is maintained or improved  Description: INTERVENTIONS:  - Identify patients at risk for skin breakdown  - Assess and monitor skin integrity  - Assess and monitor nutrition and hydration status  - Monitor labs   - Assess for incontinence   - Turn and reposition patient  - Assist with mobility/ambulation  - Relieve pressure over bony prominences  - Avoid friction and shearing  - Provide appropriate hygiene as needed including keeping skin clean and dry  - Evaluate need for skin moisturizer/barrier cream  - Collaborate with interdisciplinary team   - Patient/family teaching  - Consider wound care consult   8/31/2023 1259 by Annika Mascorro RN  Outcome: Progressing  8/31/2023 1259 by Annika Mascorro RN  Outcome: Progressing     Problem: DISCHARGE PLANNING  Goal: Discharge to home or other facility with appropriate resources  Description: INTERVENTIONS:  - Identify barriers to discharge w/patient and caregiver  - Arrange for needed discharge resources and transportation as appropriate  - Identify discharge learning needs (meds, wound care, etc.)  - Arrange for interpretive services to assist at discharge as needed  - Refer to Case Management Department for coordinating discharge planning if the patient needs post-hospital services based on physician/advanced practitioner order or complex needs related to functional status, cognitive ability, or social support system  Outcome: Progressing

## 2023-08-31 NOTE — PROGRESS NOTES
Podiatry - Progress Note  Patient: Francisco Coelho 58 y.o. male   MRN: [id number]  PCP: Andra Verdin DO  Unit/Bed#: E2 -01 Encounter: [id number]  Date Of Visit: 08/31/23    ASSESSMENT:    Francisco Coelho is a 58 y.o. male with:    1. Right plantar heel wound with concern for osteomyelitis  2. T2DM (HbA1c 6.7% from 2/2/2023)  3. History of left BKA  4. Severe lymphedema RLE  5. PAD  6. Ambulatory dysfunction      PLAN:    · Local wound care to right heel ulceration consisting of Betadine DSD. Wound appears stable at this time, without acute clinical signs of infection present. · No acute podiatric surgical plans at this time, patient is stable for discharge. Recommend STR for rehab and continued local wound care. · Reinforced importance of offloading Right heel to allow for wound healing, and prevent infection, worsening wound, or limb loss. · MRI right foot reviewed: nonspecific plantar calcaneal marrow edema without specific findings of osteomyelitis. · Wound cultures positive for 4+ growth gram negative coccobacilli. Blood cultures no growth at 24 hours. Recommend continued antibiotic therapy. · Follow up CM for STR placement. · Appreciate PT/OT recommendations. · Continue local wound care, appreciate nursing assistance with dressing changes. · Elevation and offloading on green foam wedges or pillows when non-ambulatory. · Rest of care per primary team.     Weightbearing status: Non-weightbearing right foot    SUBJECTIVE:     The patient was seen, evaluated, and assessed at bedside today. The patient was awake, alert, and in no acute distress. No acute events overnight. The patient reports that he is feeling well and denies any right lower extremity pain. He states that he is agreeable to going to short term rehab, and understands that offloading is important for wound healing. Patient denies N/V/F/chills/SOB/CP.       OBJECTIVE:     Vitals:   /56 (BP Location: Right arm)   Pulse 72   Temp (!) 97.2 °F (36.2 °C) (Temporal)   Resp 18   Wt 118 kg (259 lb 4.2 oz)   SpO2 94%   BMI 34.21 kg/m²     Temp (24hrs), Av.9 °F (36.6 °C), Min:97.2 °F (36.2 °C), Max:98.4 °F (36.9 °C)      Physical Exam:     Lungs: Non labored breathing  Abdomen: Soft, non-tender. Lower Extremity:  Cardiovascular status at baseline from admission. Neurological status at baseline from admission. Musculoskeletal status at baseline from admission. No calf tenderness noted. Wound #: 1  Location: right plantar heel wound  Length 5.3cm: Width 4.1cm: Depth 1.5cm  Deepest Tissue Noted in Base: muscle  Probe to Bone: No  Peripheral Skin Description: Attached  Granulation: 60% Fibrotic Tissue: 40% Necrotic Tissue: 0%   Drainage Amount: minimal, clear  Signs of Infection: No      Clinical Images 23: Additional Data:     Labs:    Results from last 7 days   Lab Units 23  0511 23  1210   WBC Thousand/uL 8.30 10.54*   HEMOGLOBIN g/dL 12.2 13.2   HEMATOCRIT % 40.1 42.8   PLATELETS Thousands/uL 333 363   NEUTROS PCT %  --  75   LYMPHS PCT %  --  17   MONOS PCT %  --  5   EOS PCT %  --  2     Results from last 7 days   Lab Units 23  0511 23  1210   POTASSIUM mmol/L 4.1 4.2   CHLORIDE mmol/L 97 97   CO2 mmol/L 33* 31   BUN mg/dL 17 19   CREATININE mg/dL 1.08 1.03   CALCIUM mg/dL 8.8 9.4   ALK PHOS U/L  --  49   ALT U/L  --  9   AST U/L  --  12*     Results from last 7 days   Lab Units 23  1210   INR  1.08       * I Have Reviewed All Lab Data Listed Above. Recent Cultures (last 7 days):     Results from last 7 days   Lab Units 23  1210 23  1208 23  1130   BLOOD CULTURE  No Growth at 24 hrs. No Growth at 24 hrs.  --    GRAM STAIN RESULT   --   --  4+ Gram negative coccobacilli*  No polys seen*   WOUND CULTURE   --   --  3+ Growth of Escherichia coli*     Results from last 7 days   Lab Units 23  1130   ANAEROBIC CULTURE  Culture results to follow.        Imaging: I have personally reviewed pertinent films in PACS  EKG, Pathology, and Other Studies: I have personally reviewed pertinent reports. ** Please Note: Portions of the record may have been created with voice recognition software. Occasional wrong word or "sound a like" substitutions may have occurred due to the inherent limitations of voice recognition software. Read the chart carefully and recognize, using context, where substitutions have occurred.  **

## 2023-08-31 NOTE — ASSESSMENT & PLAN NOTE
Pt is a 59 yo male with PMH significant for DM, PAD, and chronic R heel ulcer who was referred for admission from [address] [place] [address] for concerned over possible infection or fracture. · Pt had prior admissions with RLE cellulitis and PAD:  Was evaluated by vascular surgery:  Noted to have high risk of limb loss in future:  Pt was not taking ASA or statin at home as recommended. · Per patient:  He spends majority of day in his recliner with heel resting on recliner foot rest and ulcer worsening:  He also needs to ambulate up 2 steps to his house and stands on R heel.     · Xray in office concerning for open nondisplaced fracture of body of R calcaneus  · Pt with chronic diabetic ulcer of R heel:  Purulent drainange noted during Podiatry office exam:   · Per podiatry office xray concerning for osteomyelitis  · Xray foot and heel obtained: Suspected calcaneal fractures, large plantar ulcer over posterior calculus, findings indeterminate for osteomyelitis along plantar aspect of calcaneus  · Dr. Andrew Johnson recommended admission and IV antibiotics  · MRI foot without evidence of osteomyelitis  · Podiatry recommending continuing course of antibiotics but switch to oral for 7 day course  · Also will be going to short-term rehab facility-Case management working on bed

## 2023-08-31 NOTE — PHYSICAL THERAPY NOTE
PHYSICAL THERAPY NOTE          Patient Name: Dax HAGER'G Date: 8/31/2023 08/31/23 1441   Pain Assessment   Pain Assessment Tool 0-10   Pain Score No Pain   Restrictions/Precautions   Weight Bearing Precautions Per Order Yes   RLE Weight Bearing Per Order NWB   Other Precautions WBS; Fall Risk   Cognition   Overall Cognitive Status WFL   Orientation Level Oriented to person;Oriented to situation   Memory Decreased recall of precautions   Following Commands Follows one step commands without difficulty   Bed Mobility   Additional Comments Pt seated in bedside chair pre/post sesison. Transfers   Sit to Stand 2  Maximal assistance   Additional items Armrests; Increased time required;Verbal cues; Other;Assist x 3  (RLE management, RW)   Stand to Sit 2  Maximal assistance   Additional items Armrests; Increased time required;Verbal cues; Other;Assist x 3  (RW, assist for RLE management)   Sliding Board transfer 2  Maximal assistance   Additional items Increased time required;Verbal cues;Armrests;Assist x 3  (RLE management; transfer bedside chair>WC and WC>bedside chair)   Additional Comments Verbal cues for technique and maintaining NWB. Ambulation/Elevation   Gait pattern Not appropriate   Balance   Static Sitting Good   Dynamic Sitting Fair   Static Standing Zero   Endurance Deficit   Endurance Deficit Yes   Endurance Deficit Description LE weakness, hypotension. BPs: 119/59 post stand, 94/50 post transfer to [facility], 110/57 post transfer to bedside chair. Activity Tolerance   Activity Tolerance Patient limited by fatigue   Medical Staff Made Aware OT Holly Cushing, YOLANDA Thompson   Nurse Made Aware Margarita Cortez RN   Assessment   Prognosis Fair   Problem List Decreased strength;Decreased endurance; Impaired balance;Decreased mobility; Decreased safety awareness; Impaired judgement;Obesity; Decreased skin integrity; Impaired sensation   Assessment Dax Colunga is a 58 y.o. male admitted to [address] [place] [address] on 8/29/2023 for Diabetic ulcer of right heel ([address] Pt seen today for PT treatment session to improve functional mobility and progress toward stated goals. Pt demonstrated improved progress with functional transfer training. Preformed sit>stand transfer this session, with pt able to reach full standing position. Did require max assist x2 to transfer with additional Ax1 for RLE management to maintain NWB and cuing for technique. Performed 2 sliding board transfers from bedside chair <> WC with max assist x2 and continued additional Ax1 RLE management. Did require cuing for appropriate hand placement and technique but was able to use UE strength to complete with maintained WBS. Noted hypotension with mobility (see flowsheet), but recovered with seated rest. Despite improvements, pt still demonstrates impaired mobility, need for physical assist, decreased strength, balance deficits and activity intolerace. Pt will continue to benefit from skilled IP PT to address stated impairments and  in order to maximize recovery and increase functional independence when completing mobility and ADLs. PT recommendations for d/c remain post acute rehab services. Barriers to Discharge Decreased caregiver support; Other (Comment)  (increased physical assist, WBS)   Goals   Patient Goals to become more independent   STG Expiration Date 09/13/23   Short Term Goal #1 (1)  Perform all transfers with min A and appropriate technique (slide board vs pivot) 100% of the time to promote ability to safely negotiate home environment. (2) Able to self propel manual ' in order to increased community participation. (3) Improve overall strength and balance 1/2 grade to optimize ability to perform functional mobility, ADLs, IADLs and demonstrate reduced fall risk. (4) Increase activity tolerance to 45 minutes to promote endurance for completion of functional tasks.  (5) Continue PT for ongoing pt/family education, DME needs, and d/c planning to promote highest level of function in least restricted environment. PT Treatment Day 1   Plan   Treatment/Interventions Functional transfer training;LE strengthening/ROM; Therapeutic exercise; Endurance training;Patient/family training;Equipment eval/education; Compensatory technique education;OT   PT Frequency 3-5x/wk   Recommendation   PT Discharge Recommendation Post acute rehabilitation services   Equipment Recommended Other (Comment)  (sliding board)   AM-PAC Basic Mobility Inpatient   Turning in Flat Bed Without Bedrails 3   Lying on Back to Sitting on Edge of Flat Bed Without Bedrails 3   Moving Bed to Chair 1   Standing Up From Chair Using Arms 1   Walk in Room 1   Climb 3-5 Stairs With Railing 1   Basic Mobility Inpatient Raw Score 10   Turning Head Towards Sound 4   Follow Simple Instructions 4   Low Function Basic Mobility Raw Score  18   Low Function Basic Mobility Standardized Score  29.25   Highest Level Of Mobility   JH-HLM Goal 4: Move to chair/commode   JH-HLM Achieved 4: Move to chair/commode   End of Consult   Patient Position at End of Consult Bedside chair; All needs within [facility]

## 2023-08-31 NOTE — PLAN OF CARE
Problem: OCCUPATIONAL THERAPY ADULT  Goal: Performs self-care activities at highest level of function for planned discharge setting. See evaluation for individualized goals. Description: Treatment Interventions: ADL retraining, Functional transfer training, UE strengthening/ROM, Endurance training, Patient/family training, Equipment evaluation/education, Compensatory technique education, Continued evaluation, Activityengagement          See flowsheet documentation for full assessment, interventions and recommendations. Outcome: Progressing  Note: Limitation: Decreased ADL status, Decreased UE strength, Decreased Safe judgement during ADL, Decreased endurance, Decreased self-care trans, Decreased high-level ADLs  Prognosis: Good  Assessment: Pt seen for 53min tx sesssion with focus on functional balance, functional mobility, ADL status, transfer safety, b/l UE strengthening, and education. Pt able to tolerate OOB mobility; sitting balance=f+/f, standing balance=p/p-. Pt required heavy assistance with all functional mobility tasks, including sliding board transfers. Pt demonstrating need for assistance with LE ADLs. Orthostatic hypotension noted with functional mobility tasks. Pt continues to demonstrate appropriateness for inpt rehab to improve his overall level of independence and improve his compliance with his R LE NWB status. Pt pleasant and cooperative with tx session. Will continue.      OT Discharge Recommendation: Post acute rehabilitation services

## 2023-08-31 NOTE — ASSESSMENT & PLAN NOTE
· He notes he had orthostatic hypotension but supine hypertension   · Prescribed midodrine 2.5 mg tid ac meals, but since he is no longer ambulating or upright, he stopped taking it  · Per RN-  When pt used commode his sbp dropped to 60's:  Restarted on midodrine  · Caution with standing and advised slow movements

## 2023-08-31 NOTE — PROGRESS NOTES
[facility]  Progress Note  Name: Carmen Calix I  MRN: [id number]  Unit/Bed#: E2 -01 I Date of Admission: 8/29/2023   Date of Service: 8/31/2023 I Hospital Day: 2    Assessment/Plan   * Diabetic ulcer of right heel [facility])  Assessment & Plan  Pt is a 57 yo male with PMH significant for DM, PAD, and chronic R heel ulcer who was referred for admission from [address] [place] [address] for concerned over possible infection or fracture. · Pt had prior admissions with RLE cellulitis and PAD:  Was evaluated by vascular surgery:  Noted to have high risk of limb loss in future:  Pt was not taking ASA or statin at home as recommended. · Per patient:  He spends majority of day in his recliner with heel resting on recliner foot rest and ulcer worsening:  He also needs to ambulate up 2 steps to his house and stands on R heel.     · Xray in office concerning for open nondisplaced fracture of body of R calcaneus  · Pt with chronic diabetic ulcer of R heel:  Purulent drainange noted during Podiatry office exam:   · Per podiatry office xray concerning for osteomyelitis  · Xray foot and heel obtained: Suspected calcaneal fractures, large plantar ulcer over posterior calculus, findings indeterminate for osteomyelitis along plantar aspect of calcaneus  · Dr. Oswaldo Juárez recommended admission and IV antibiotics  · MRI foot without evidence of osteomyelitis  · Podiatry recommending continuing course of antibiotics but switch to oral for 7 day course  · Also will be going to short-term rehab facility-Case management working on bed    Orthostatic hypotension  Assessment & Plan  · He notes he had orthostatic hypotension but supine hypertension   · Prescribed midodrine 2.5 mg tid ac meals, but since he is no longer ambulating or upright, he stopped taking it  · Per RN-  When pt used commode his sbp dropped to 60's:  Restarted on midodrine  · Caution with standing and advised slow movements    PAD (peripheral artery disease) ([address])  Assessment & Plan  · Pt follows with vascular surgery for PAD  · LEADS 4/23 with "right 50-70% proximal popliteal stenosis"  · H/o prior left BKA in 2018  · Was prescribed ASA, plavix, statin  · Pt self-discontinued ASA/statin- resumed here    Type 2 diabetes mellitus with diabetic neuropathy, with long-term current use of insulin [facility])  Assessment & Plan  Lab Results   Component Value Date    HGBA1C 6.7 (A) 02/02/2023     Recent Labs     08/30/23  1534 08/30/23  2125 08/31/23  0630 08/31/23  1050   POCGLU 118 157* 104 129   · Uses long-term use of insulin, with PAD and neuropathy  · Home regimen lantus 80u BID  · Will continue lantus- but lowered dose to due to 40 units BID given restricted diet in hospital  · Will decrease to 35 units BID to avoid hypoglycemia  · Cont accuchecks and SSI    CAD (coronary artery disease)  Assessment & Plan  · H/o prior CABG  · Prescribd ASA, statin, Plavix: self-discontinued ASA/statin- restarted  · No evidence of acute ischemia  · Cont outpt follow up    Chronic diastolic heart failure (HCC)  Assessment & Plan  Wt Readings from Last 3 Encounters:   08/30/23 118 kg (259 lb 4.2 oz)   07/31/23 118 kg (260 lb)   07/21/23 125 kg (275 lb 9.2 oz)   · Most recent Echo with LVEF 2/2020 with normal LVEF, grade 1 diastolic dysfunction  · Continue home lasix 40 mg bid    Class 2 severe obesity due to excess calories with serious comorbidity and body mass index (BMI) of 35.0 to 35.9 in adult [facility])  Assessment & Plan  · Dietary counseling, lifestyle modification    Diabetic gastroparesis (HCC)  Assessment & Plan  · Continue PPI daily  · Continue reglan 5mg tid prn  · Continue frequent small meals    S/P BKA (below knee amputation), left (HCC)  Assessment & Plan  · Left leg prosthesis in place  · Will be going to [facility] rather than going home    Hyperlipidemia  Assessment & Plan  · Patient self discontinued statin  · Restarted here    VTE Pharmacologic Prophylaxis:   Pharmacologic: Heparin  Mechanical VTE Prophylaxis in Place: Yes    Discharge Plan: with need for continued inpatient stay pending STR bed    Discussions with Specialists or Other Care Team Provider: nursing    Education and Discussions with Family / Patient: patient    Time Spent for Care: 45 minutes. This time was spent on one or more of the following: performing physical exam; counseling and coordination of care; obtaining or reviewing history; documenting in the medical record; reviewing/ordering tests, medications, or procedures; communicating with other healthcare professionals and discussing with patient's family/caregivers. Current Length of Stay: 2 day(s)  Current Patient Status: Inpatient   Code Status: Level 1 - Full Code    Subjective:   Resting in chair at bedside. Reports he is relieved that he does not have osteomyelitis in his right heel however does understand the importance of continued wound healing as well as going to short-term rehab. Will need course of oral antibiotics. Objective:     Vitals:   Temp (24hrs), Av.9 °F (36.6 °C), Min:97.2 °F (36.2 °C), Max:98.4 °F (36.9 °C)    Temp:  [97.2 °F (36.2 °C)-98.4 °F (36.9 °C)] 97.2 °F (36.2 °C)  HR:  [70-75] 72  Resp:  [18] 18  BP: (105-127)/(50-56) 127/56  SpO2:  [93 %-94 %] 94 %  Body mass index is 34.21 kg/m². Input and Output Summary (last 24 hours): Intake/Output Summary (Last 24 hours) at 2023 1307  Last data filed at 2023 9822  Gross per 24 hour   Intake --   Output 1050 ml   Net -1050 ml       Physical Exam:     Physical Exam  Vitals and nursing note reviewed. Constitutional:       General: He is not in acute distress. Appearance: He is obese. He is not ill-appearing, toxic-appearing or diaphoretic. HENT:      Head: Normocephalic and atraumatic. Eyes:      General: No scleral icterus. Cardiovascular:      Rate and Rhythm: Normal rate and regular rhythm.    Pulmonary:      Effort: Pulmonary effort is normal. No respiratory distress. Breath sounds: Normal breath sounds. No stridor. No wheezing or rhonchi. Abdominal:      General: Bowel sounds are normal. There is no distension. Palpations: Abdomen is soft. There is no mass. Tenderness: There is no abdominal tenderness. Hernia: No hernia is present. Musculoskeletal:         General: Swelling present. Right lower leg: Edema present. Comments: Left leg prosthesis in place   Neurological:      Mental Status: He is alert and oriented to person, place, and time. Mental status is at baseline. Psychiatric:         Mood and Affect: Mood normal.         Behavior: Behavior normal.         Additional Data:     Labs:    Results from last 7 days   Lab Units 08/31/23  0511 08/29/23  1210   WBC Thousand/uL 8.30 10.54*   HEMOGLOBIN g/dL 12.2 13.2   HEMATOCRIT % 40.1 42.8   PLATELETS Thousands/uL 333 363   NEUTROS PCT %  --  75   LYMPHS PCT %  --  17   MONOS PCT %  --  5   EOS PCT %  --  2     Results from last 7 days   Lab Units 08/31/23  0511 08/29/23  1210   POTASSIUM mmol/L 4.1 4.2   CHLORIDE mmol/L 97 97   CO2 mmol/L 33* 31   BUN mg/dL 17 19   CREATININE mg/dL 1.08 1.03   CALCIUM mg/dL 8.8 9.4   ALK PHOS U/L  --  49   ALT U/L  --  9   AST U/L  --  12*     Results from last 7 days   Lab Units 08/29/23  1210   INR  1.08       * I Have Reviewed All Lab Data Listed Above. * Additional Pertinent Lab Tests Reviewed: [facility] Admission Reviewed    Imaging:    Imaging Reports Reviewed Today Include: mri right foot  Imaging Personally Reviewed by Myself Includes:      Recent Cultures (last 7 days):     Results from last 7 days   Lab Units 08/29/23  1210 08/29/23  1208 08/29/23  1130   BLOOD CULTURE  No Growth at 24 hrs.  No Growth at 24 hrs.  --    GRAM STAIN RESULT   --   --  4+ Gram negative coccobacilli*  No polys seen*   WOUND CULTURE   --   --  3+ Growth of Escherichia coli*  3+ Growth of Proteus mirabilis*  3+ Growth of Lines/Drains:  Invasive Devices     Peripheral Intravenous Line  Duration           Peripheral IV 08/29/23 Left Antecubital 2 days                Last 24 Hours Medication List:   Current Facility-Administered Medications   Medication Dose Route Frequency Provider Last Rate   • acetaminophen  650 mg Oral Q6H PRN Ofelia Yost MD     • aspirin  81 mg Oral Daily Ofelia Yost MD     • atorvastatin  80 mg Oral Daily With Марина Santos MD     • calcium carbonate  1,000 mg Oral Daily PRN Ofelia Yost MD     • cefazolin  2,000 mg Intravenous Holly Baugh MD Stopped (08/31/23 9736)   • clopidogrel  75 mg Oral Daily Ofelia Yost MD     • Diclofenac Sodium  2 g Topical BID PRN Christian Sharma PA-C     • docusate sodium  100 mg Oral BID Ofelia Yost MD     • finasteride  5 mg Oral Daily With Марина Santos MD     • furosemide  40 mg Oral BID (diuretic) Ofelia Yost MD     • heparin (porcine)  5,000 Units Subcutaneous [facility] Ofelia Yost MD     • insulin glargine  40 Units Subcutaneous Q12H Sherif Sood MD     • insulin lispro  1-5 Units Subcutaneous HS Ofelia Yost MD     • insulin lispro  1-6 Units Subcutaneous TID AC Ofelia Yost MD     • LORazepam  1 mg Intravenous Once PRN Christian Sharma PA-C     • melatonin  3 mg Oral HS PRN Ofelia Yost MD     • metoclopramide  5 mg Oral Daily Ofelia Yost MD     • metroNIDAZOLE  500 mg Intravenous Holly Baugh MD Stopped (08/31/23 6577)   • midodrine  2.5 mg Oral TID Akua Church MD     • ondansetron  4 mg Intravenous Q6H PRN Ofelia Yost MD     • pantoprazole  40 mg Oral Early Morning Ofelia Yost MD     • polyethylene glycol  17 g Oral Daily Ofelia Yost MD     • tamsulosin  0.4 mg Oral Daily With Марина Santos MD          Today, Patient Was Seen By: Christian Sharma PA-C    ** Please Note: This note has been constructed using a voice recognition system.  **

## 2023-09-01 LAB
BACTERIA WND AEROBE CULT: ABNORMAL
GLUCOSE SERPL-MCNC: 115 MG/DL (ref 65–140)
GLUCOSE SERPL-MCNC: 152 MG/DL (ref 65–140)
GLUCOSE SERPL-MCNC: 176 MG/DL (ref 65–140)
GLUCOSE SERPL-MCNC: 184 MG/DL (ref 65–140)
GRAM STN SPEC: ABNORMAL
GRAM STN SPEC: ABNORMAL

## 2023-09-01 PROCEDURE — 82948 REAGENT STRIP/BLOOD GLUCOSE: CPT

## 2023-09-01 PROCEDURE — 99232 SBSQ HOSP IP/OBS MODERATE 35: CPT | Performed by: PHYSICIAN ASSISTANT

## 2023-09-01 RX ORDER — CEPHALEXIN 500 MG/1
500 CAPSULE ORAL EVERY 6 HOURS SCHEDULED
Status: DISCONTINUED | OUTPATIENT
Start: 2023-09-01 | End: 2023-09-02 | Stop reason: HOSPADM

## 2023-09-01 RX ORDER — METRONIDAZOLE 500 MG/1
500 TABLET ORAL EVERY 8 HOURS SCHEDULED
Status: DISCONTINUED | OUTPATIENT
Start: 2023-09-01 | End: 2023-09-02 | Stop reason: HOSPADM

## 2023-09-01 RX ORDER — FUROSEMIDE 10 MG/ML
20 INJECTION INTRAMUSCULAR; INTRAVENOUS ONCE
Status: COMPLETED | OUTPATIENT
Start: 2023-09-01 | End: 2023-09-01

## 2023-09-01 RX ADMIN — METRONIDAZOLE 500 MG: 500 TABLET ORAL at 14:48

## 2023-09-01 RX ADMIN — ASPIRIN 81 MG: 81 TABLET, COATED ORAL at 08:04

## 2023-09-01 RX ADMIN — FUROSEMIDE 40 MG: 40 TABLET ORAL at 17:00

## 2023-09-01 RX ADMIN — HEPARIN SODIUM 5000 UNITS: 5000 INJECTION INTRAVENOUS; SUBCUTANEOUS at 14:03

## 2023-09-01 RX ADMIN — CEFAZOLIN SODIUM 2000 MG: 2 SOLUTION INTRAVENOUS at 07:48

## 2023-09-01 RX ADMIN — HEPARIN SODIUM 5000 UNITS: 5000 INJECTION INTRAVENOUS; SUBCUTANEOUS at 06:23

## 2023-09-01 RX ADMIN — MIDODRINE HYDROCHLORIDE 2.5 MG: 5 TABLET ORAL at 17:00

## 2023-09-01 RX ADMIN — PANTOPRAZOLE SODIUM 40 MG: 40 TABLET, DELAYED RELEASE ORAL at 06:23

## 2023-09-01 RX ADMIN — INSULIN LISPRO 1 UNITS: 100 INJECTION, SOLUTION INTRAVENOUS; SUBCUTANEOUS at 11:45

## 2023-09-01 RX ADMIN — FINASTERIDE 5 MG: 5 TABLET, FILM COATED ORAL at 17:00

## 2023-09-01 RX ADMIN — INSULIN LISPRO 1 UNITS: 100 INJECTION, SOLUTION INTRAVENOUS; SUBCUTANEOUS at 17:04

## 2023-09-01 RX ADMIN — HEPARIN SODIUM 5000 UNITS: 5000 INJECTION INTRAVENOUS; SUBCUTANEOUS at 22:02

## 2023-09-01 RX ADMIN — ATORVASTATIN CALCIUM 80 MG: 40 TABLET, FILM COATED ORAL at 17:00

## 2023-09-01 RX ADMIN — MIDODRINE HYDROCHLORIDE 2.5 MG: 5 TABLET ORAL at 11:45

## 2023-09-01 RX ADMIN — FUROSEMIDE 40 MG: 40 TABLET ORAL at 07:54

## 2023-09-01 RX ADMIN — INSULIN GLARGINE 40 UNITS: 100 INJECTION, SOLUTION SUBCUTANEOUS at 08:03

## 2023-09-01 RX ADMIN — CLOPIDOGREL BISULFATE 75 MG: 75 TABLET ORAL at 08:04

## 2023-09-01 RX ADMIN — FUROSEMIDE 20 MG: 20 INJECTION, SOLUTION INTRAMUSCULAR; INTRAVENOUS at 14:48

## 2023-09-01 RX ADMIN — TAMSULOSIN HYDROCHLORIDE 0.4 MG: 0.4 CAPSULE ORAL at 17:00

## 2023-09-01 RX ADMIN — CEPHALEXIN 500 MG: 500 CAPSULE ORAL at 22:02

## 2023-09-01 RX ADMIN — METRONIDAZOLE 500 MG: 500 INJECTION, SOLUTION INTRAVENOUS at 10:00

## 2023-09-01 RX ADMIN — CEPHALEXIN 500 MG: 500 CAPSULE ORAL at 14:48

## 2023-09-01 RX ADMIN — METRONIDAZOLE 500 MG: 500 TABLET ORAL at 22:02

## 2023-09-01 RX ADMIN — MIDODRINE HYDROCHLORIDE 2.5 MG: 5 TABLET ORAL at 06:23

## 2023-09-01 RX ADMIN — INSULIN LISPRO 1 UNITS: 100 INJECTION, SOLUTION INTRAVENOUS; SUBCUTANEOUS at 22:03

## 2023-09-01 RX ADMIN — DOCUSATE SODIUM 100 MG: 100 CAPSULE, LIQUID FILLED ORAL at 17:00

## 2023-09-01 RX ADMIN — INSULIN GLARGINE 40 UNITS: 100 INJECTION, SOLUTION SUBCUTANEOUS at 22:03

## 2023-09-01 NOTE — PLAN OF CARE
Problem: MOBILITY - ADULT  Goal: Maintain or return to baseline ADL function  Description: INTERVENTIONS:  -  Assess patient's ability to carry out ADLs; assess patient's baseline for ADL function and identify physical deficits which impact ability to perform ADLs (bathing, care of mouth/teeth, toileting, grooming, dressing, etc.)  - Assess/evaluate cause of self-care deficits   - Assess range of motion  - Assess patient's mobility; develop plan if impaired  - Assess patient's need for assistive devices and provide as appropriate  - Encourage maximum independence but intervene and supervise when necessary  - Involve family in performance of ADLs  - Assess for home care needs following discharge   - Consider OT consult to assist with ADL evaluation and planning for discharge  - Provide patient education as appropriate  Outcome: Progressing     Problem: PAIN - ADULT  Goal: Verbalizes/displays adequate comfort level or baseline comfort level  Description: Interventions:  - Encourage patient to monitor pain and request assistance  - Assess pain using appropriate pain scale  - Administer analgesics based on type and severity of pain and evaluate response  - Implement non-pharmacological measures as appropriate and evaluate response  - Consider cultural and social influences on pain and pain management  - Notify physician/advanced practitioner if interventions unsuccessful or patient reports new pain  Outcome: Progressing     Problem: Prexisting or High Potential for Compromised Skin Integrity  Goal: Skin integrity is maintained or improved  Description: INTERVENTIONS:  - Identify patients at risk for skin breakdown  - Assess and monitor skin integrity  - Assess and monitor nutrition and hydration status  - Monitor labs   - Assess for incontinence   - Turn and reposition patient  - Assist with mobility/ambulation  - Relieve pressure over bony prominences  - Avoid friction and shearing  - Provide appropriate hygiene as needed including keeping skin clean and dry  - Evaluate need for skin moisturizer/barrier cream  - Collaborate with interdisciplinary team   - Patient/family teaching  - Consider wound care consult   Outcome: Progressing     Problem: Knowledge Deficit  Goal: Patient/family/caregiver demonstrates understanding of disease process, treatment plan, medications, and discharge instructions  Description: Complete learning assessment and assess knowledge base.   Interventions:  - Provide teaching at level of understanding  - Provide teaching via preferred learning methods  Outcome: Progressing

## 2023-09-01 NOTE — CASE MANAGEMENT
Case Management Discharge Planning Note    Patient name Tyler Rick  Location [facility] /E2 [address] [facility] [place] [address] MRN [id number]  : 1960 Date 2023       Current Admission Date: 2023  Current Admission Diagnosis:Diabetic ulcer of right heel [facility])   Patient Active Problem List    Diagnosis Date Noted   • Diabetic ulcer of right heel ([address]) 2023   • Lymphedema in adult patient 2023   • Fracture, toe 2023   • Cellulitis of right ankle 2023   • Non-pressure chronic ulcer of right calf with fat layer exposed ([address]) 2023   • Other acute osteomyelitis, left ankle and foot ([address]) 10/29/2020   • Embolism and thrombosis of arteries of the lower extremities ([address]) 10/29/2020   • Lymphedema of right lower extremity 10/29/2020   • Venous stasis dermatitis of right lower extremity 10/29/2020   • Abdominal distension 10/29/2020   • 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   • Nodular rash 2020   • CAD (coronary artery disease) 2019   • Amputated below knee, left ([address]) 10/04/2018   • Phantom limb syndrome without pain ([address]) 10/04/2018   • Obstructive sleep apnea 2018   • Hyponatremia 2018   • Diabetic autonomic neuropathy associated with type 2 diabetes mellitus ([address]) 2018   • S/P CABG x 3 2018   • Other constipation 2018   • Chronic diastolic heart failure ([address]) 2018   • Hypertensive heart disease with congestive heart failure (HCC)    • Triple vessel coronary artery disease 2018   • Diabetic gastroparesis ([address]) 2018   • Class 2 severe obesity due to excess calories with serious comorbidity and body mass index (BMI) of 35.0 to 35.9 in adult ([address]) 2018   • Orthostatic hypotension 2018   • PAD (peripheral artery disease) ([address]) 2018   • S/P BKA (below knee amputation), left ([address]) 2018   • Anxiety and depression 2017   • 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 12/28/2017   • Vitamin D deficiency 09/20/2016   • Hyperlipidemia 02/11/2015   • Type 2 diabetes mellitus with diabetic neuropathy, with long-term current use of insulin ([address]) 11/06/2014      LOS (days): 3  Geometric Mean LOS (GMLOS) (days): 5.40  Days to GMLOS:2.3     OBJECTIVE:  Risk of Unplanned Readmission Score: 14.06         Current admission status: Inpatient   Preferred Pharmacy:   [facility] RAZIA PEREIRA [address], [address]  [place] [address]  Phone: [phone] Fax: [phone]    [facility] ([address] [address]  Phone: [phone] Fax: [phone]    [facility] ([facility]) - Radha CASTELLON [address]  Phone: [phone] Fax: [phone]    Primary Care Provider: Stefano Monroe DO    Primary Insurance: [facility] [address] [facility]:     [address] [facility] [address] Number: [id number]

## 2023-09-01 NOTE — CASE MANAGEMENT
[facility] has received approved authorization from insurance: Luis Fernando Tomlin in by Rep: [place] P# [phone]   Authorization received for: SNF  Facility: [facility] #: [id number]  Start of Care: 09/01/2023.   Next Review Date: 09/05/2023  Continued Stay Care Coordinator: Isaiah GAONA#: [phone]  Submit next review to: Fax. [phone]    Care Manager notified: Dima Asher

## 2023-09-01 NOTE — CASE MANAGEMENT
Case Management Discharge Planning Note    Patient name Nina Benjamin  Location [facility] /E2 [address] [facility] [place] [address] MRN [id number]  : 1960 Date 2023       Current Admission Date: 2023  Current Admission Diagnosis:Diabetic ulcer of right heel [facility])   Patient Active Problem List    Diagnosis Date Noted   • Diabetic ulcer of right heel ([address]) 2023   • Lymphedema in adult patient 2023   • Fracture, toe 2023   • Cellulitis of right ankle 2023   • Non-pressure chronic ulcer of right calf with fat layer exposed ([address]) 2023   • Other acute osteomyelitis, left ankle and foot ([address]) 10/29/2020   • Embolism and thrombosis of arteries of the lower extremities ([address]) 10/29/2020   • Lymphedema of right lower extremity 10/29/2020   • Venous stasis dermatitis of right lower extremity 10/29/2020   • Abdominal distension 10/29/2020   • 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   • Nodular rash 2020   • CAD (coronary artery disease) 2019   • Amputated below knee, left ([address]) 10/04/2018   • Phantom limb syndrome without pain ([address]) 10/04/2018   • Obstructive sleep apnea 2018   • Hyponatremia 2018   • Diabetic autonomic neuropathy associated with type 2 diabetes mellitus ([address]) 2018   • S/P CABG x 3 2018   • Other constipation 2018   • Chronic diastolic heart failure ([address]) 2018   • Hypertensive heart disease with congestive heart failure (HCC)    • Triple vessel coronary artery disease 2018   • Diabetic gastroparesis ([address]) 2018   • Class 2 severe obesity due to excess calories with serious comorbidity and body mass index (BMI) of 35.0 to 35.9 in adult ([address]) 2018   • Orthostatic hypotension 2018   • PAD (peripheral artery disease) ([address]) 2018   • S/P BKA (below knee amputation), left ([address]) 2018   • Anxiety and depression 2017   • 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 12/28/2017   • Vitamin D deficiency 09/20/2016   • Hyperlipidemia 02/11/2015   • Type 2 diabetes mellitus with diabetic neuropathy, with long-term current use of insulin ([address]) 11/06/2014      LOS (days): 3  Geometric Mean LOS (GMLOS) (days): 5.40  Days to GMLOS:2.2     OBJECTIVE:  Risk of Unplanned Readmission Score: 14.25         Current admission status: Inpatient   Preferred Pharmacy:   [facility] [address] RAZIA PEREIRA [address] [place], [place] [address] [place] [address]  Phone: [phone] Fax: [phone]    [facility] ([address]  [address] [facility] [place] [address]  Phone: [phone] Fax: [phone]    [facility] ([facility]) - RAVINDER [address]  Phone: [phone] Fax: [phone]    Primary Care Provider: Rekha Mccall DO    Primary Insurance: Saskia Sotomayor [facility]  Secondary Insurance:     DISCHARGE DETAILS:    Discharge planning discussed with[de-identified] Patient  Freedom of Choice: Yes  Comments - Freedom of Choice: Choice list reviewed.  Pt decided on [facility]  CM contacted family/caregiver?: No- see comments (declined need)  Were Treatment Team discharge recommendations reviewed with patient/caregiver?: Yes  Did patient/caregiver verbalize understanding of patient care needs?: N/A- going to facility  Were patient/caregiver advised of the risks associated with not following Treatment Team discharge recommendations?: Yes         Requested [address]         Is the patient interested in [facility] at discharge?: No    DME Referral Provided  Referral made for DME?: No    Other Referral/Resources/Interventions Provided:  Interventions: Short Term Rehab  Referral Comments: Referrals placed via aidin         Treatment Team Recommendation: Short Term Rehab  Discharge Destination Plan[de-identified] Short Term Rehab  Transport at Discharge : [facility]        Transported by [employer] and Unit #):  SARITA  ETA of Transport (Date): 09/01/23  ETA of Transport (Time): 1000     Transfer Mode: Stretcher  Accompanied by: EMS personnel     IMM Given (Date):: 09/01/23  IMM Given to[de-identified] Patient          Accepting Facility Name, [address] [place] [address] : [facility] Hossein Marquez  Receiving Facility/Agency Phone Number: [phone]  Facility/Agency Fax Number: [phone]

## 2023-09-01 NOTE — CASE MANAGEMENT
[facility] received request for authorization from Care Manager. Authorization request for: SNF  Facility Name: Nunu Cali NPI: [id number]    Facility MD: Darrion Pham NPI: [id number]     Authorization initiated by contacting insurance: Dimitris Wang: Phone.  [phone]  Pending Reference #: [id number]  Clinicals submitted via: Fax. [phone]

## 2023-09-01 NOTE — PLAN OF CARE
Problem: Potential for Falls  Goal: Patient will remain free of falls  Description: INTERVENTIONS:  - Educate patient/family on patient safety including physical limitations  - Instruct patient to call for assistance with activity   - Consult OT/PT to assist with strengthening/mobility   - Keep Call bell within reach  - Keep bed low and locked with side rails adjusted as appropriate  - Keep care items and personal belongings within reach  - Initiate and maintain comfort rounds  - Make Fall Risk Sign visible to staff  - Offer Toileting every 2 Hours, in advance of need  - Initiate/Maintain chair alarm  - Apply yellow socks and bracelet for high fall risk patients  - Consider moving patient to room near nurses station  Outcome: Progressing     Problem: Prexisting or High Potential for Compromised Skin Integrity  Goal: Skin integrity is maintained or improved  Description: INTERVENTIONS:  - Identify patients at risk for skin breakdown  - Assess and monitor skin integrity  - Assess and monitor nutrition and hydration status  - Monitor labs   - Assess for incontinence   - Turn and reposition patient  - Assist with mobility/ambulation  - Relieve pressure over bony prominences  - Avoid friction and shearing  - Provide appropriate hygiene as needed including keeping skin clean and dry  - Evaluate need for skin moisturizer/barrier cream  - Collaborate with interdisciplinary team   - Patient/family teaching  - Consider wound care consult   Outcome: Progressing     Problem: PAIN - ADULT  Goal: Verbalizes/displays adequate comfort level or baseline comfort level  Description: Interventions:  - Encourage patient to monitor pain and request assistance  - Assess pain using appropriate pain scale  - Administer analgesics based on type and severity of pain and evaluate response  - Implement non-pharmacological measures as appropriate and evaluate response  - Consider cultural and social influences on pain and pain management  - Notify physician/advanced practitioner if interventions unsuccessful or patient reports new pain  Outcome: Progressing

## 2023-09-01 NOTE — ASSESSMENT & PLAN NOTE
Wt Readings from Last 3 Encounters:   09/01/23 118 kg (260 lb 5.8 oz)   07/31/23 118 kg (260 lb)   07/21/23 125 kg (275 lb 9.2 oz)   · Most recent Echo with LVEF 2/2020 with normal LVEF, grade 1 diastolic dysfunction  · Continue home lasix 40 mg bid

## 2023-09-01 NOTE — PROGRESS NOTES
[facility]  Progress Note  Name: Donna Rudd I  MRN: [id number]  Unit/Bed#: E2 -01 I Date of Admission: 8/29/2023   Date of Service: 9/1/2023 I Hospital Day: 3    Assessment/Plan   * Diabetic ulcer of right heel [facility])  Assessment & Plan  Pt is a 59 yo male with PMH significant for DM, PAD, and chronic R heel ulcer who was referred for admission from [address] [place] [address] for concerned over possible infection or fracture. · Pt had prior admissions with RLE cellulitis and PAD:  Was evaluated by vascular surgery:  Noted to have high risk of limb loss in future:  Pt was not taking ASA or statin at home as recommended. · Per patient:  He spends majority of day in his recliner with heel resting on recliner foot rest and ulcer worsening:  He also needs to ambulate up 2 steps to his house and stands on R heel.     · Xray in office concerning for open nondisplaced fracture of body of R calcaneus  · Pt with chronic diabetic ulcer of R heel:  Purulent drainange noted during Podiatry office exam:   · Per podiatry office xray concerning for osteomyelitis  · Xray foot and heel obtained: Suspected calcaneal fractures, large plantar ulcer over posterior calculus, findings indeterminate for osteomyelitis along plantar aspect of calcaneus  · Dr. Blessing Nieto recommended admission and IV antibiotics  · MRI foot without evidence of osteomyelitis  · Podiatry recommending continuing course of antibiotics but switch to oral to complete a 7 day course  · Give dose of IV lasix to help with LE edema  · Awaiting insurance authorization and bed at short-term rehab    Orthostatic hypotension  Assessment & Plan  · He notes he had orthostatic hypotension but supine hypertension   · Prescribed midodrine 2.5 mg tid ac meals, but since he is no longer ambulating or upright, he stopped taking it  · Per RN-  When pt used commode his sbp dropped to 60's:  Restarted on midodrine  · Caution with standing and advised slow movements    PAD (peripheral artery disease) ([address])  Assessment & Plan  · Pt follows with vascular surgery for PAD  · LEADS 4/23 with "right 50-70% proximal popliteal stenosis"  · H/o prior left BKA in 2018  · Was prescribed ASA, plavix, statin  · Pt self-discontinued ASA/statin- resumed here    Type 2 diabetes mellitus with diabetic neuropathy, with long-term current use of insulin [facility])  Assessment & Plan  Lab Results   Component Value Date    HGBA1C 6.7 (A) 02/02/2023     Recent Labs     08/31/23  1546 08/31/23  2051 09/01/23  0615 09/01/23  1034   POCGLU 149* 124 115 176*   · Uses long-term use of insulin, with PAD and neuropathy  · Home regimen lantus 80u BID  · Will continue lantus- lowered dose to due to 40 units BID given restricted diet in hospital  · Continue Accu-Cheks and sliding scale    CAD (coronary artery disease)  Assessment & Plan  · H/o prior CABG  · Prescribd ASA, statin, Plavix: self-discontinued ASA/statin- restarted  · No evidence of acute ischemia  · Cont outpt follow up    Chronic diastolic heart failure (HCC)  Assessment & Plan  Wt Readings from Last 3 Encounters:   09/01/23 118 kg (260 lb 5.8 oz)   07/31/23 118 kg (260 lb)   07/21/23 125 kg (275 lb 9.2 oz)   · Most recent Echo with LVEF 2/2020 with normal LVEF, grade 1 diastolic dysfunction  · Continue home lasix 40 mg bid    Class 2 severe obesity due to excess calories with serious comorbidity and body mass index (BMI) of 35.0 to 35.9 in adult [facility])  Assessment & Plan  · Dietary counseling, lifestyle modification    Diabetic gastroparesis (HCC)  Assessment & Plan  · Continue PPI daily  · Continue reglan 5mg tid prn  · Continue frequent small meals    S/P BKA (below knee amputation), left (HCC)  Assessment & Plan  · Left leg prosthesis in place  · Will be going to [facility] rather than going home    Hyperlipidemia  Assessment & Plan  · Patient self discontinued statin  · Restarted here    VTE Pharmacologic Prophylaxis:   Pharmacologic: Heparin  Mechanical VTE Prophylaxis in Place: Yes    Discharge Plan: With need for continued inpatient stay for short-term rehab bed    Discussions with Specialists or Other Care Team Provider: Nursing, case management    Education and Discussions with Family / Patient: Patient    Time Spent for Care: 45 minutes. This time was spent on one or more of the following: performing physical exam; counseling and coordination of care; obtaining or reviewing history; documenting in the medical record; reviewing/ordering tests, medications, or procedures; communicating with other healthcare professionals and discussing with patient's family/caregivers. Current Length of Stay: 3 day(s)  Current Patient Status: Inpatient   Code Status: Level 1 - Full Code    Subjective:   Resting in chair at bedside. He reports feeling fine today. He is anxiously awaiting bed at rehab facility. His main concern is being able to sleep in a recliner as he does not normally lie in bed. Objective:     Vitals:   Temp (24hrs), Av.8 °F (36.6 °C), Min:97.3 °F (36.3 °C), Max:98.3 °F (36.8 °C)    Temp:  [97.3 °F (36.3 °C)-98.3 °F (36.8 °C)] 98.3 °F (36.8 °C)  HR:  [68-69] 69  Resp:  [18-20] 18  BP: (144-146)/(65-73) 146/65  SpO2:  [94 %-98 %] 98 %  Body mass index is 34.35 kg/m². Input and Output Summary (last 24 hours): Intake/Output Summary (Last 24 hours) at 2023 1440  Last data filed at 2023 1336  Gross per 24 hour   Intake 600 ml   Output 1300 ml   Net -700 ml       Physical Exam:     Physical Exam  Vitals and nursing note reviewed. Constitutional:       General: He is not in acute distress. Appearance: Normal appearance. He is normal weight. He is not ill-appearing, toxic-appearing or diaphoretic. HENT:      Head: Normocephalic and atraumatic. Eyes:      General: No scleral icterus. Cardiovascular:      Rate and Rhythm: Normal rate and regular rhythm.    Pulmonary:      Effort: Pulmonary effort is normal. No respiratory distress. Breath sounds: No stridor. No wheezing or rhonchi. Abdominal:      General: Bowel sounds are normal. There is no distension. Palpations: Abdomen is soft. There is no mass. Tenderness: There is no abdominal tenderness. Hernia: No hernia is present. Genitourinary:     Comments: Left BKA noted, right lower extremity with gauze wrap in place  Musculoskeletal:         General: No swelling. Cervical back: Neck supple. Right lower leg: Edema present. Skin:     General: Skin is warm and dry. Neurological:      Mental Status: He is oriented to person, place, and time. Mental status is at baseline. Psychiatric:         Mood and Affect: Mood normal.         Behavior: Behavior normal.         Additional Data:     Labs:    Results from last 7 days   Lab Units 08/31/23  0511 08/29/23  1210   WBC Thousand/uL 8.30 10.54*   HEMOGLOBIN g/dL 12.2 13.2   HEMATOCRIT % 40.1 42.8   PLATELETS Thousands/uL 333 363   NEUTROS PCT %  --  75   LYMPHS PCT %  --  17   MONOS PCT %  --  5   EOS PCT %  --  2     Results from last 7 days   Lab Units 08/31/23  0511 08/29/23  1210   POTASSIUM mmol/L 4.1 4.2   CHLORIDE mmol/L 97 97   CO2 mmol/L 33* 31   BUN mg/dL 17 19   CREATININE mg/dL 1.08 1.03   CALCIUM mg/dL 8.8 9.4   ALK PHOS U/L  --  49   ALT U/L  --  9   AST U/L  --  12*     Results from last 7 days   Lab Units 08/29/23  1210   INR  1.08       * I Have Reviewed All Lab Data Listed Above. * Additional Pertinent Lab Tests Reviewed: [address] [place] [address] Admission Reviewed    Imaging:    Imaging Reports Reviewed Today Include:   Imaging Personally Reviewed by Myself Includes:      Recent Cultures (last 7 days):     Results from last 7 days   Lab Units 08/29/23  1210 08/29/23  1208 08/29/23  1130   BLOOD CULTURE  No Growth at 48 hrs.  No Growth at 48 hrs.  --    GRAM STAIN RESULT   --   --  4+ Gram negative coccobacilli*  No polys seen*   WOUND CULTURE   --   --  3+ Growth of Escherichia coli*  3+ Growth of Proteus mirabilis*  3+ Growth of       Lines/Drains:  Invasive Devices     Peripheral Intravenous Line  Duration           Peripheral IV 08/29/23 Left Antecubital 3 days                Last 24 Hours Medication List:   Current Facility-Administered Medications   Medication Dose Route Frequency Provider Last Rate   • acetaminophen  650 mg Oral Q6H PRN Aneta Gilmore MD     • aspirin  81 mg Oral Daily Aneta Gilmore MD     • atorvastatin  80 mg Oral Daily With Krystian Aguilar MD     • calcium carbonate  1,000 mg Oral Daily PRN Aneta Gilmore MD     • cephalexin  500 mg Oral Q6H [address] [facility] NUNO Hallman     • clopidogrel  75 mg Oral Daily Aneta Gilmore MD     • Diclofenac Sodium  2 g Topical BID PRN Carlos Harris PA-C     • docusate sodium  100 mg Oral BID Aneta Gilmore MD     • finasteride  5 mg Oral Daily With Krystian Aguilar MD     • furosemide  20 mg Intravenous Once Carlos Harris PA-C     • furosemide  40 mg Oral BID (diuretic) Aneta Gilmore MD     • heparin (porcine)  5,000 Units Subcutaneous [facility] Aneta Gilmore MD     • insulin glargine  40 Units Subcutaneous Q12H Harleen Hayes MD     • insulin lispro  1-5 Units Subcutaneous HS Aneta Gilmore MD     • insulin lispro  1-6 Units Subcutaneous TID AC Aneta Gilmore MD     • LORazepam  1 mg Intravenous Once PRN Carlos Harris PA-C     • melatonin  3 mg Oral HS PRN Aneta Gilmore MD     • metoclopramide  5 mg Oral Daily Aneta Gilmore MD     • metroNIDAZOLE  500 mg Oral Q8H [address] [facility] VANESSA HallmanC     • midodrine  2.5 mg Oral TID AC Aneta Gilmore MD     • ondansetron  4 mg Intravenous Q6H PRN Aneta Gilmore MD     • pantoprazole  40 mg Oral Early Morning Aneta Gilmore MD     • polyethylene glycol  17 g Oral Daily Aneta Gilmore MD     • tamsulosin  0.4 mg Oral Daily With Krystian Aguilar MD          Today, Patient Was Seen By: Carlos Harris PA-C    ** Please Note: This note has been constructed using a voice recognition system.  **

## 2023-09-01 NOTE — ASSESSMENT & PLAN NOTE
Pt is a 57 yo male with PMH significant for DM, PAD, and chronic R heel ulcer who was referred for admission from [address] [place] [address] for concerned over possible infection or fracture. · Pt had prior admissions with RLE cellulitis and PAD:  Was evaluated by vascular surgery:  Noted to have high risk of limb loss in future:  Pt was not taking ASA or statin at home as recommended. · Per patient:  He spends majority of day in his recliner with heel resting on recliner foot rest and ulcer worsening:  He also needs to ambulate up 2 steps to his house and stands on R heel.     · Xray in office concerning for open nondisplaced fracture of body of R calcaneus  · Pt with chronic diabetic ulcer of R heel:  Purulent drainange noted during Podiatry office exam:   · Per podiatry office xray concerning for osteomyelitis  · Xray foot and heel obtained: Suspected calcaneal fractures, large plantar ulcer over posterior calculus, findings indeterminate for osteomyelitis along plantar aspect of calcaneus  · Dr. Feroz Bautista recommended admission and IV antibiotics  · MRI foot without evidence of osteomyelitis  · Podiatry recommending continuing course of antibiotics but switch to oral to complete a 7 day course  · Give dose of IV lasix to help with LE edema  · Awaiting insurance authorization and bed at short-term rehab

## 2023-09-01 NOTE — ASSESSMENT & PLAN NOTE
Lab Results   Component Value Date    HGBA1C 6.7 (A) 02/02/2023     Recent Labs     08/31/23  1546 08/31/23 2051 09/01/23  0615 09/01/23  1034   POCGLU 149* 124 115 176*   · Uses long-term use of insulin, with PAD and neuropathy  · Home regimen lantus 80u BID  · Will continue lantus- lowered dose to due to 40 units BID given restricted diet in hospital  · Continue Accu-Cheks and sliding scale

## 2023-09-02 ENCOUNTER — TELEPHONE (OUTPATIENT)
Dept: OTHER | Facility: OTHER | Age: 63
End: 2023-09-02

## 2023-09-02 VITALS
RESPIRATION RATE: 20 BRPM | BODY MASS INDEX: 34.23 KG/M2 | WEIGHT: 259.48 LBS | DIASTOLIC BLOOD PRESSURE: 66 MMHG | HEART RATE: 72 BPM | OXYGEN SATURATION: 98 % | TEMPERATURE: 97.2 F | SYSTOLIC BLOOD PRESSURE: 120 MMHG

## 2023-09-02 LAB — GLUCOSE SERPL-MCNC: 113 MG/DL (ref 65–140)

## 2023-09-02 PROCEDURE — 99239 HOSP IP/OBS DSCHRG MGMT >30: CPT

## 2023-09-02 PROCEDURE — 82948 REAGENT STRIP/BLOOD GLUCOSE: CPT

## 2023-09-02 RX ORDER — INSULIN GLARGINE 100 [IU]/ML
40 INJECTION, SOLUTION SUBCUTANEOUS 2 TIMES DAILY
Refills: 0 | Status: SHIPPED
Start: 2023-09-02

## 2023-09-02 RX ORDER — CEPHALEXIN 500 MG/1
500 CAPSULE ORAL EVERY 6 HOURS SCHEDULED
Qty: 10 CAPSULE | Refills: 0 | Status: SHIPPED
Start: 2023-09-02 | End: 2023-09-05

## 2023-09-02 RX ORDER — DOCUSATE SODIUM 100 MG/1
100 CAPSULE, LIQUID FILLED ORAL 2 TIMES DAILY PRN
Qty: 6 CAPSULE | Refills: 0 | Status: SHIPPED
Start: 2023-09-02 | End: 2023-09-07

## 2023-09-02 RX ORDER — METRONIDAZOLE 500 MG/1
500 TABLET ORAL EVERY 8 HOURS SCHEDULED
Qty: 9 TABLET | Refills: 0 | Status: SHIPPED
Start: 2023-09-02 | End: 2023-09-05

## 2023-09-02 RX ADMIN — PANTOPRAZOLE SODIUM 40 MG: 40 TABLET, DELAYED RELEASE ORAL at 06:37

## 2023-09-02 RX ADMIN — METRONIDAZOLE 500 MG: 500 TABLET ORAL at 06:37

## 2023-09-02 RX ADMIN — METOCLOPRAMIDE 5 MG: 5 TABLET ORAL at 08:24

## 2023-09-02 RX ADMIN — CLOPIDOGREL BISULFATE 75 MG: 75 TABLET ORAL at 08:24

## 2023-09-02 RX ADMIN — HEPARIN SODIUM 5000 UNITS: 5000 INJECTION INTRAVENOUS; SUBCUTANEOUS at 06:38

## 2023-09-02 RX ADMIN — FUROSEMIDE 40 MG: 40 TABLET ORAL at 08:24

## 2023-09-02 RX ADMIN — ASPIRIN 81 MG: 81 TABLET, COATED ORAL at 08:24

## 2023-09-02 RX ADMIN — INSULIN GLARGINE 40 UNITS: 100 INJECTION, SOLUTION SUBCUTANEOUS at 08:24

## 2023-09-02 RX ADMIN — DOCUSATE SODIUM 100 MG: 100 CAPSULE, LIQUID FILLED ORAL at 08:24

## 2023-09-02 RX ADMIN — MIDODRINE HYDROCHLORIDE 2.5 MG: 5 TABLET ORAL at 06:37

## 2023-09-02 RX ADMIN — CEPHALEXIN 500 MG: 500 CAPSULE ORAL at 06:37

## 2023-09-02 NOTE — ASSESSMENT & PLAN NOTE
Lab Results   Component Value Date    HGBA1C 6.7 (A) 02/02/2023     Recent Labs     09/01/23  1034 09/01/23  1535 09/01/23 2031 09/02/23  0623   POCGLU 176* 184* 152* 113   Uses long-term use of insulin, with PAD and neuropathy  · Home regimen lantus 80u BID  · Continue with lantus- lowered dose to due to 40 units BID given restricted diet  · Continue regimen on discharge with diabetic diet, accu checks and hypoglycemic protocol  · Sugars well controlled

## 2023-09-02 NOTE — ASSESSMENT & PLAN NOTE
He notes he had orthostatic hypotension but supine hypertension   · Prescribed midodrine 2.5 mg tid ac meals, but since he is no longer ambulating or upright, he stopped taking it  · Per RN-  When pt used commode his sbp dropped to 60's:  Restarted on midodrine here 2.5 mg three times daily before meals  · Blood pressures improved, without dizziness/lightheadedness upon movement patient is able to complete  · Caution with standing and advised slow movements  · Follow up PCP and cardiology

## 2023-09-02 NOTE — PLAN OF CARE
Problem: Potential for Falls  Goal: Patient will remain free of falls  Description: INTERVENTIONS:  - Educate patient/family on patient safety including physical limitations  - Instruct patient to call for assistance with activity   - Consult OT/PT to assist with strengthening/mobility   - Keep Call bell within reach  - Keep bed low and locked with side rails adjusted as appropriate  - Keep care items and personal belongings within reach  - Initiate and maintain comfort rounds  - Make Fall Risk Sign visible to staff  - Offer Toileting every 2 Hours, in advance of need  - Initiate/Maintain chair alarm  - Apply yellow socks and bracelet for high fall risk patients  - Consider moving patient to room near nurses station  Outcome: Progressing     Problem: MOBILITY - ADULT  Goal: Maintain or return to baseline ADL function  Description: INTERVENTIONS:  -  Assess patient's ability to carry out ADLs; assess patient's baseline for ADL function and identify physical deficits which impact ability to perform ADLs (bathing, care of mouth/teeth, toileting, grooming, dressing, etc.)  - Assess/evaluate cause of self-care deficits   - Assess range of motion  - Assess patient's mobility; develop plan if impaired  - Assess patient's need for assistive devices and provide as appropriate  - Encourage maximum independence but intervene and supervise when necessary  - Involve family in performance of ADLs  - Assess for home care needs following discharge   - Consider OT consult to assist with ADL evaluation and planning for discharge  - Provide patient education as appropriate  Outcome: Progressing  Goal: Maintains/Returns to pre admission functional level  Description: INTERVENTIONS:  - Perform BMAT or MOVE assessment daily.   - Set and communicate daily mobility goal to care team and patient/family/caregiver. - Collaborate with rehabilitation services on mobility goals if consulted  - Perform Range of Motion 3 times a day.   - Reposition patient every 2 hours.   - Dangle patient 3 times a day  - Stand patient 3 times a day  - Ambulate patient 3 times a day  - Out of bed to chair 3 times a day   - Out of bed for meals 3 times a day  - Out of bed for toileting  - Record patient progress and toleration of activity level   Outcome: Progressing     Problem: Prexisting or High Potential for Compromised Skin Integrity  Goal: Skin integrity is maintained or improved  Description: INTERVENTIONS:  - Identify patients at risk for skin breakdown  - Assess and monitor skin integrity  - Assess and monitor nutrition and hydration status  - Monitor labs   - Assess for incontinence   - Turn and reposition patient  - Assist with mobility/ambulation  - Relieve pressure over bony prominences  - Avoid friction and shearing  - Provide appropriate hygiene as needed including keeping skin clean and dry  - Evaluate need for skin moisturizer/barrier cream  - Collaborate with interdisciplinary team   - Patient/family teaching  - Consider wound care consult   Outcome: Progressing     Problem: PAIN - ADULT  Goal: Verbalizes/displays adequate comfort level or baseline comfort level  Description: Interventions:  - Encourage patient to monitor pain and request assistance  - Assess pain using appropriate pain scale  - Administer analgesics based on type and severity of pain and evaluate response  - Implement non-pharmacological measures as appropriate and evaluate response  - Consider cultural and social influences on pain and pain management  - Notify physician/advanced practitioner if interventions unsuccessful or patient reports new pain  Outcome: Progressing     Problem: DISCHARGE PLANNING  Goal: Discharge to home or other facility with appropriate resources  Description: INTERVENTIONS:  - Identify barriers to discharge w/patient and caregiver  - Arrange for needed discharge resources and transportation as appropriate  - Identify discharge learning needs (meds, wound care, etc.)  - Arrange for interpretive services to assist at discharge as needed  - Refer to Case Management Department for coordinating discharge planning if the patient needs post-hospital services based on physician/advanced practitioner order or complex needs related to functional status, cognitive ability, or social support system  Outcome: Progressing     Problem: Knowledge Deficit  Goal: Patient/family/caregiver demonstrates understanding of disease process, treatment plan, medications, and discharge instructions  Description: Complete learning assessment and assess knowledge base.   Interventions:  - Provide teaching at level of understanding  - Provide teaching via preferred learning methods  Outcome: Progressing

## 2023-09-02 NOTE — DISCHARGE SUMMARY
[facility]  Discharge- Angélica Levo 1960, 58 y.o. male MRN: [id number]  Unit/Bed#: E2 -01 Encounter: [id number]  Primary Care Provider: Malissa Mauricio DO   Date and time admitted to hospital: 8/29/2023 10:56 AM    * Diabetic ulcer of right heel [facility])  Assessment & Plan  Pt is a 59 yo male with PMH significant for DM, PAD, and chronic R heel ulcer who was referred for admission from [address] [place] [address] for concerned over possible infection or fracture. · Pt had prior admissions with RLE cellulitis and PAD:  Was evaluated by vascular surgery:  Noted to have high risk of limb loss in future:  Pt was not taking ASA or statin at home as recommended. · Per patient:  He spends majority of day in his recliner with heel resting on recliner foot rest and ulcer worsening:  He also needs to ambulate up 2 steps to his house and stands on R heel. · Xray in office concerning for open nondisplaced fracture of body of R calcaneus  · Pt with chronic diabetic ulcer of R heel:  Purulent drainange noted during Podiatry office exam:   · Per podiatry office xray concerning for osteomyelitis  · Xray foot and heel obtained: Suspected calcaneal fractures, large plantar ulcer over posterior calculus, findings indeterminate for osteomyelitis along plantar aspect of calcaneus  · Dr. Angela Mares recommended admission and IV antibiotics  · MRI foot without evidence of osteomyelitis  · Podiatry recommending continuing course of antibiotics but switch to oral to complete a 7 day course  · Given dose of IV lasix to help with LE edema  · Wound cultures reviewed, sensitives reviewed  · Received IV Ancef and Flagul, transitioned to oral Keflex and flagyl for additional 3 days to complete 7 day oral course.  Script sent on discharge  · Discharge to short term rehab [facility]  · Follow up with Dr Angela Mares noted on 9/5  · Wound care and follow up referrals all placed    CAD (coronary artery disease)  Assessment & Plan  H/o prior CABG, last office visit in 2018  · Prescribed ASA, statin, Plavix: self-discontinued ASA/statin- restarted  · No evidence of acute ischemia  · Without chest pain  · Needs to re establish care on discharge from rehab    Chronic diastolic heart failure (HCC)  Assessment & Plan  Wt Readings from Last 3 Encounters:   09/02/23 118 kg (259 lb 7.7 oz)   07/31/23 118 kg (260 lb)   07/21/23 125 kg (275 lb 9.2 oz)   Most recent Echo with LVEF 2/2020 with normal LVEF, grade 1 diastolic dysfunction  · Continue home lasix 40 mg twice daily  · Re establish care with OP cardiology for follow up    Class 2 severe obesity due to excess calories with serious comorbidity and body mass index (BMI) of 35.0 to 35.9 in adult [facility])  Assessment & Plan  BMI 34.23, noted  · contributes to all aspects of care, benefit from weight loss    Diabetic gastroparesis (HCC)  Assessment & Plan  Continue PPI daily  · Continue reglan 5mg tid prn  · Continue frequent small meals  · Colace twice daily prn    Orthostatic hypotension  Assessment & Plan  He notes he had orthostatic hypotension but supine hypertension   · Prescribed midodrine 2.5 mg tid ac meals, but since he is no longer ambulating or upright, he stopped taking it  · Per RN-  When pt used commode his sbp dropped to 60's:  Restarted on midodrine here 2.5 mg three times daily before meals  · Blood pressures improved, without dizziness/lightheadedness upon movement patient is able to complete  · Caution with standing and advised slow movements  · Follow up PCP and cardiology    S/P BKA (below knee amputation), left [facility])  Assessment & Plan  Left leg prosthesis in place, noted  · Plan to STR    PAD (peripheral artery disease) ([address])  Assessment & Plan  Pt follows with vascular surgery for PAD  · LEADS 4/23 with "right 50-70% proximal popliteal stenosis"  · H/o prior left BKA in 2018  · Was prescribed ASA, plavix, statin  · Pt self-discontinued medications, were resumed here. · Continue medication compliance and needs OP follow up    Hyperlipidemia  Assessment & Plan  Patient self discontinued statin  · Restarted here  · Discussed importance of med compliance     Type 2 diabetes mellitus with diabetic neuropathy, with long-term current use of insulin [facility])  Assessment & Plan  Lab Results   Component Value Date    HGBA1C 6.7 (A) 02/02/2023     Recent Labs     09/01/23  1034 09/01/23  1535 09/01/23  2031 09/02/23  0623   POCGLU 176* 184* 152* 113   Uses long-term use of insulin, with PAD and neuropathy  · Home regimen lantus 80u BID  · Continue with lantus- lowered dose to due to 40 units BID given restricted diet  · Continue regimen on discharge with diabetic diet, accu checks and hypoglycemic protocol  · Sugars well controlled    Medical Problems     Resolved Problems  Date Reviewed: 9/2/2023   None       Discharging Physician / Practitioner: Audrey Loya PA-C  PCP: Brissa Yu DO  Admission Date:   Admission Orders (From admission, onward)     Ordered        08/29/23 1300  INPATIENT ADMISSION  Once                      Discharge Date: 09/02/23    Consultations During Hospital Stay:  · Wound care, podiatry, physical therapy, Occupational Therapy    Significant Findings / Test Results:   MRI ankle/heel right wo contrast  Result Date: 8/30/2023  Impression: Mild, nonspecific plantar calcaneal marrow edema without specific findings of osteomyelitis. No abscess. XR foot 3+ views RIGHT  Result Date: 8/29/2023  Impression: No acute osseous abnormality. Suspected calcaneal fractures appear to be related to superimposed linear crevices. Large plantar ulcer overlying the posterior calcaneus. XR heel / calcaneus 2+ vw right  Result Date: 8/29/2023  Impression: Ulceration along the plantar aspect of the hindfoot. Findings indeterminate for osteomyelitis along the plantar aspect the calcaneus.  New Y-shaped lucency within the posterior calcaneus may be artifactual given overlying bandages, though a nondisplaced fracture not excluded. Outpatient Tests Requested:  · Defer to outpatient providers    Complications:      Reason for Admission: Diabetic ulcer right heel    Hospital Course:   Felipa Kelly is a 58 y.o. male patient who originally presented to the hospital on 8/29/2023 due to concerns for possible infection and/or fracture of right heel ulcer. Presented from podiatry office. Patient has had prior admissions for RLE cellulitis and PAD and was evaluated vascular surgery noted to have high risk of limb loss in the future. Patient is up and taking his aspirin or statin at home as recommended and this was restarted here. X-ray here with suspected calcaneal fracture, large plantar ulcer over posterior calcaneous. MRI foot without evidence of osteomyelitis. BCID without any findings. Podiatry recommending continue patient IV antibiotics. He received 3 days IV of Flagyl and Ancef with transition to oral Keflex and Flagyl. He will continue this on discharge to [facility] as recommended by PT OT. He will complete a 7-day course. Patient's mild white count improved and he remained afebrile. He was counseled on importance of medication compliance. He was restarted on his medications as he should have been taking prior to admission. He should have close follow-up in the outpatient with his PCP and cardiology. Please see above list of diagnoses and related plan for additional information. Condition at Discharge: good    Discharge Day Visit / Exam:   Subjective: Patient seen and examined sitting up in the chair today. Reports he feels well is ready for rehab. He does not have feeling in his bottom right foot which is baseline for him. Denies any fever, chills, chest pain, shortness of breath or abdominal pain. He does have some abdominal crampiness and is passing gas. Difficulty moves his bowels every few days.   Does not like scheduled stool softeners however. Denies any lightheadedness or dizziness when moving around in the chair. He has not been having issues urinating. Vitals: Blood Pressure: 120/66 (09/02/23 0600)  Pulse: 72 (09/02/23 0600)  Temperature: (!) 97.2 °F (36.2 °C) (09/02/23 0600)  Temp Source: Temporal (09/02/23 0600)  Respirations: 20 (09/02/23 0600)  Weight - Scale: 118 kg (259 lb 7.7 oz) (09/02/23 0531)  SpO2: 98 % (09/02/23 0600)     Exam:   Physical Exam  Vitals and nursing note reviewed. Constitutional:       General: He is not in acute distress. Appearance: Normal appearance. He is well-developed. He is obese. He is not ill-appearing, toxic-appearing or diaphoretic. HENT:      Head: Normocephalic and atraumatic. Cardiovascular:      Rate and Rhythm: Normal rate and regular rhythm. Heart sounds: Normal heart sounds. No murmur heard. Pulmonary:      Effort: Pulmonary effort is normal. No respiratory distress. Breath sounds: Normal breath sounds. No wheezing, rhonchi or rales. Abdominal:      General: Bowel sounds are normal. There is no distension. Palpations: Abdomen is soft. Tenderness: There is no abdominal tenderness. Comments: Protuberant abdomen   Musculoskeletal:      Comments: L prosthesis   Skin:     General: Skin is warm and dry. Findings: Lesion (RLE chronic skin changes) present. Neurological:      General: No focal deficit present. Mental Status: He is alert and oriented to person, place, and time. Psychiatric:         Mood and Affect: Mood normal.         Behavior: Behavior normal.        Discharge instructions/Information to patient and family:   See after visit summary for information provided to patient and family. Provisions for Follow-Up Care:  See after visit summary for information related to follow-up care and any pertinent home health orders.        Disposition:   Other [facility] Readmission: No     Discharge Statement:  I spent 60 minutes discharging the patient. This time was spent on the day of discharge. I had direct contact with the patient on the day of discharge. Greater than 50% of the total time was spent examining patient, answering all patient questions, arranging and discussing plan of care with patient as well as directly providing post-discharge instructions. Additional time then spent on discharge activities. Discharge Medications:  See after visit summary for reconciled discharge medications provided to patient and/or family.       **Please Note: This note may have been constructed using a voice recognition system**

## 2023-09-02 NOTE — ASSESSMENT & PLAN NOTE
Pt is a 59 yo male with PMH significant for DM, PAD, and chronic R heel ulcer who was referred for admission from [address] [place] [address] for concerned over possible infection or fracture. · Pt had prior admissions with RLE cellulitis and PAD:  Was evaluated by vascular surgery:  Noted to have high risk of limb loss in future:  Pt was not taking ASA or statin at home as recommended. · Per patient:  He spends majority of day in his recliner with heel resting on recliner foot rest and ulcer worsening:  He also needs to ambulate up 2 steps to his house and stands on R heel. · Xray in office concerning for open nondisplaced fracture of body of R calcaneus  · Pt with chronic diabetic ulcer of R heel:  Purulent drainange noted during Podiatry office exam:   · Per podiatry office xray concerning for osteomyelitis  · Xray foot and heel obtained: Suspected calcaneal fractures, large plantar ulcer over posterior calculus, findings indeterminate for osteomyelitis along plantar aspect of calcaneus  · Dr. Carleen Orr recommended admission and IV antibiotics  · MRI foot without evidence of osteomyelitis  · Podiatry recommending continuing course of antibiotics but switch to oral to complete a 7 day course  · Given dose of IV lasix to help with LE edema  · Wound cultures reviewed, sensitives reviewed  · Received IV Ancef and Flagul, transitioned to oral Keflex and flagyl for additional 3 days to complete 7 day oral course.  Script sent on discharge  · Discharge to short term rehab [facility]  · Follow up with Dr Carleen Orr noted on 9/5  · Wound care and follow up referrals all placed

## 2023-09-02 NOTE — DISCHARGE INSTR - OTHER ORDERS
Discharge Instructions - Podiatry    Weight Bearing Status: Non-weight bearing right foot                   Pain: Continue analgesics as directed    Follow-up appointment instructions: Please make an appointment within one week of discharge with [address] [place] [address]. Contact sooner if any increase in pain, or signs of infection occur    Wound Care: Leave dressings clean, dry, and intact between professional dressing changes    Nursing Instructions: Please apply Betadine wet to dry to right heel wound. Then cover with Gauze and secure with Kerlix and tape. Please change dressing every other day.

## 2023-09-02 NOTE — DISCHARGE INSTR - AVS FIRST PAGE
Discharge Instructions - Podiatry    Weight Bearing Status: Non-weight bearing right foot                   Pain: Continue analgesics as directed    Follow-up appointment instructions: Please make an appointment within one week of discharge with [address] [place] [address]. Contact sooner if any increase in pain, or signs of infection occur    Wound Care: Leave dressings clean, dry, and intact between professional dressing changes    Nursing Instructions: Please apply Betadine wet to dry to right heel wound. Then cover with Gauze and secure with Kerlix and tape. Please change dressing every other day.

## 2023-09-02 NOTE — ASSESSMENT & PLAN NOTE
Continue PPI daily  · Continue reglan 5mg tid prn  · Continue frequent small meals  · Colace twice daily prn

## 2023-09-02 NOTE — NURSING NOTE
IV was removed last night, patient was washed up and cleaned this AM and put on his clothes. Vitals stable, waiting for call back from facility to give report on patient.

## 2023-09-02 NOTE — ASSESSMENT & PLAN NOTE
H/o prior CABG, last office visit in 2018  · Prescribed ASA, statin, Plavix: self-discontinued ASA/statin- restarted  · No evidence of acute ischemia  · Without chest pain  · Needs to re establish care on discharge from rehab

## 2023-09-02 NOTE — PLAN OF CARE
Problem: Potential for Falls  Goal: Patient will remain free of falls  Description: INTERVENTIONS:  - Educate patient/family on patient safety including physical limitations  - Instruct patient to call for assistance with activity   - Consult OT/PT to assist with strengthening/mobility   - Keep Call bell within reach  - Keep bed low and locked with side rails adjusted as appropriate  - Keep care items and personal belongings within reach  - Initiate and maintain comfort rounds  - Make Fall Risk Sign visible to staff  - Offer Toileting every 2 Hours, in advance of need  - Initiate/Maintain chair alarm  - Apply yellow socks and bracelet for high fall risk patients  - Consider moving patient to room near nurses station  Outcome: Progressing     Problem: MOBILITY - ADULT  Goal: Maintain or return to baseline ADL function  Description: INTERVENTIONS:  -  Assess patient's ability to carry out ADLs; assess patient's baseline for ADL function and identify physical deficits which impact ability to perform ADLs (bathing, care of mouth/teeth, toileting, grooming, dressing, etc.)  - Assess/evaluate cause of self-care deficits   - Assess range of motion  - Assess patient's mobility; develop plan if impaired  - Assess patient's need for assistive devices and provide as appropriate  - Encourage maximum independence but intervene and supervise when necessary  - Involve family in performance of ADLs  - Assess for home care needs following discharge   - Consider OT consult to assist with ADL evaluation and planning for discharge  - Provide patient education as appropriate  Outcome: Progressing  Goal: Maintains/Returns to pre admission functional level  Description: INTERVENTIONS:  - Perform BMAT or MOVE assessment daily.   - Set and communicate daily mobility goal to care team and patient/family/caregiver. - Collaborate with rehabilitation services on mobility goals if consulted  - Perform Range of Motion 3 times a day.   - Reposition patient every 2 hours.   - Dangle patient 3 times a day  - Stand patient 3 times a day  - Ambulate patient 3 times a day  - Out of bed to chair 3 times a day   - Out of bed for meals 3 times a day  - Out of bed for toileting  - Record patient progress and toleration of activity level   Outcome: Progressing     Problem: Prexisting or High Potential for Compromised Skin Integrity  Goal: Skin integrity is maintained or improved  Description: INTERVENTIONS:  - Identify patients at risk for skin breakdown  - Assess and monitor skin integrity  - Assess and monitor nutrition and hydration status  - Monitor labs   - Assess for incontinence   - Turn and reposition patient  - Assist with mobility/ambulation  - Relieve pressure over bony prominences  - Avoid friction and shearing  - Provide appropriate hygiene as needed including keeping skin clean and dry  - Evaluate need for skin moisturizer/barrier cream  - Collaborate with interdisciplinary team   - Patient/family teaching  - Consider wound care consult   Outcome: Progressing     Problem: PAIN - ADULT  Goal: Verbalizes/displays adequate comfort level or baseline comfort level  Description: Interventions:  - Encourage patient to monitor pain and request assistance  - Assess pain using appropriate pain scale  - Administer analgesics based on type and severity of pain and evaluate response  - Implement non-pharmacological measures as appropriate and evaluate response  - Consider cultural and social influences on pain and pain management  - Notify physician/advanced practitioner if interventions unsuccessful or patient reports new pain  Outcome: Progressing

## 2023-09-02 NOTE — ASSESSMENT & PLAN NOTE
Wt Readings from Last 3 Encounters:   09/02/23 118 kg (259 lb 7.7 oz)   07/31/23 118 kg (260 lb)   07/21/23 125 kg (275 lb 9.2 oz)   Most recent Echo with LVEF 2/2020 with normal LVEF, grade 1 diastolic dysfunction  · Continue home lasix 40 mg twice daily  · Re establish care with OP cardiology for follow up

## 2023-09-02 NOTE — ASSESSMENT & PLAN NOTE
Pt follows with vascular surgery for PAD  · LEADS 4/23 with "right 50-70% proximal popliteal stenosis"  · H/o prior left BKA in 2018  · Was prescribed ASA, plavix, statin  · Pt self-discontinued medications, were resumed here.    · Continue medication compliance and needs OP follow up

## 2023-09-03 LAB
ALL TARGETS: NOT DETECTED
BACTERIA BLD CULT: NORMAL
BACTERIA BLD CULT: NORMAL
GRAM STN SPEC: NORMAL

## 2023-09-05 ENCOUNTER — NURSING HOME VISIT (OUTPATIENT)
Dept: GERIATRICS | Facility: OTHER | Age: 63
End: 2023-09-05
Payer: COMMERCIAL

## 2023-09-05 ENCOUNTER — TRANSITIONAL CARE MANAGEMENT (OUTPATIENT)
Dept: FAMILY MEDICINE CLINIC | Facility: CLINIC | Age: 63
End: 2023-09-05

## 2023-09-05 DIAGNOSIS — E11.40 TYPE 2 DIABETES MELLITUS WITH DIABETIC NEUROPATHY, WITH LONG-TERM CURRENT USE OF INSULIN (HCC): ICD-10-CM

## 2023-09-05 DIAGNOSIS — I50.32 CHRONIC HEART FAILURE WITH PRESERVED EJECTION FRACTION (HCC): ICD-10-CM

## 2023-09-05 DIAGNOSIS — Z79.4 TYPE 2 DIABETES MELLITUS WITH DIABETIC NEUROPATHY, WITH LONG-TERM CURRENT USE OF INSULIN (HCC): ICD-10-CM

## 2023-09-05 DIAGNOSIS — I25.10 CORONARY ARTERY DISEASE INVOLVING NATIVE CORONARY ARTERY OF NATIVE HEART WITHOUT ANGINA PECTORIS: ICD-10-CM

## 2023-09-05 DIAGNOSIS — L97.415 DIABETIC ULCER OF RIGHT HEEL ASSOCIATED WITH TYPE 2 DIABETES MELLITUS, WITH MUSCLE INVOLVEMENT WITHOUT EVIDENCE OF NECROSIS (HCC): Primary | ICD-10-CM

## 2023-09-05 DIAGNOSIS — E11.621 DIABETIC ULCER OF RIGHT HEEL ASSOCIATED WITH TYPE 2 DIABETES MELLITUS, WITH MUSCLE INVOLVEMENT WITHOUT EVIDENCE OF NECROSIS (HCC): Primary | ICD-10-CM

## 2023-09-05 DIAGNOSIS — K31.84 DIABETIC GASTROPARESIS (HCC): Chronic | ICD-10-CM

## 2023-09-05 DIAGNOSIS — E11.43 DIABETIC GASTROPARESIS (HCC): Chronic | ICD-10-CM

## 2023-09-05 DIAGNOSIS — Z89.512 S/P BKA (BELOW KNEE AMPUTATION), LEFT (HCC): ICD-10-CM

## 2023-09-05 DIAGNOSIS — Z95.1 S/P CABG X 3: ICD-10-CM

## 2023-09-05 DIAGNOSIS — E11.43 DIABETIC AUTONOMIC NEUROPATHY ASSOCIATED WITH TYPE 2 DIABETES MELLITUS (HCC): ICD-10-CM

## 2023-09-05 PROCEDURE — 99306 1ST NF CARE HIGH MDM 50: CPT | Performed by: INTERNAL MEDICINE

## 2023-09-05 NOTE — PROGRESS NOTES
1505 [facility]  [address] [place] [address] [address]  Facility: [address]  and [address]  31    HISTORY AND PHYSICAL    NAME: Autumn Eisenberg  AGE: 58 y.o. SEX: male    DATE OF ENCOUNTER: 9/5/2023    Code status:  CPR    Assessment and Plan     1. Diabetic ulcer of right heel associated with type 2 diabetes mellitus, with muscle involvement without evidence of necrosis ([address])  Assessment & Plan:  · Wound culture E. coli, Proteus mirabilis, Bacteroides fragilis  · He is doing well after recent hospitalization  Secondary to his decreased level of functioning from baseline, he will be admitted to the subacute rehabilitation facility and seen in consultation by a multidisciplinary rehabilitation team for evaluation and treatment to assist him in returning to his prior level of functioning  We will continue his care in collaboration with his podiatry service  Nursing staff to continue with local wound care  We will continue with monitoring for change in his condition  He will follow-up with his PCP and podiatry services upon discharge      2. Chronic heart failure with preserved ejection fraction (HCC)  Assessment & Plan:  · We will continue his prior to admission furosemide  · We will have his weight monitor daily with nursing staff advised to call if his weight is less than 255 or more than 265  · He will follow-up with his PCP upon discharge            3. Type 2 diabetes mellitus with diabetic neuropathy, with long-term current use of insulin ([facility])  Assessment & Plan:    Lab Results   Component Value Date    HGBA1C 6.7 (A) 02/02/2023   · Will continue as prior to admission insulin glargine 40 units twice daily and add lispro insulin 5 units 4 times daily for a blood sugar greater than 250  · He will follow-up with his PCP upon discharge      4.  Diabetic autonomic neuropathy associated with type 2 diabetes mellitus ([address])  Assessment & Plan:  · We will continue his prior to admission midodrine 3 times daily with meals with holding parameter for greater than 489 systolic blood pressure and instructions to remain upright for 1 hour after dosage   · We will continue with monitoring for changes condition  · He will follow-up with his PCP upon discharge      5. Coronary artery disease involving native coronary artery of native heart without angina pectoris  Assessment & Plan:  · We will continue his prior to admission aspirin, clopidogrel, and atorvastatin for secondary prevention  · We will continue with monitoring for change in his condition  · He will follow-up with his PCP upon discharge      6. S/P CABG x 3    7. Diabetic gastroparesis ([address])  Assessment & Plan:  · We will continue his prior to admission once daily metoclopramide (confirmed medication with his wife)  · We will continue to monitor for change in his condition  · He will follow-up with his PCP upon discharge      8. S/P BKA (below knee amputation), [facility])  Assessment & Plan:  Secondary to his decreased level of functioning from baseline, he will be admitted to the subacute rehabilitation facility and seen in consultation by a multidisciplinary rehabilitation team for evaluation and treatment to assist him in returning to his prior level of functioning  We will continue with monitoring for change in his condition  He will follow-up with his PCP upon discharge      See recent hospital discharge summary note for further information. All medications and routine orders were reviewed and updated as needed. Plan discussed with: Patient, nursing staff, patient's wife with patient's permission. CC: PCP    Chief Complaint     He is seen for a visit to perform a history and physical exam to be admitted to the subacute rehabilitation facility.     History of Present Illness     History is obtained from patient and review, review of electronic medical record, and from his wife via a phone call with patient's permission. He is a pleasant 40-year-old gentleman with the comorbidities of insulin requiring type 2 diabetes mellitus with diabetic neuropathy, chronic heart failure with preserved ejection fraction, coronary artery disease being status post CABG, diabetic autonomic neuropathy with orthostatic hypotension, diabetic gastroparesis, being status post left BKA, and peripheral arterial disease who is seen for a visit to perform a history and physical exam to be admitted to the subacute rehabilitation facility. He presented to the hospital on August 29, 2023 from his podiatry service's office secondary to concern of osteomyelitis developing in a right diabetic heel wound. He was admitted to the acute care hospital from August 29, 2023 through September 2, 2023. Fortunately, MRI imaging of his right heel failed to reveal osteomyelitis. Wound culture revealed E. coli, Proteus mirabilis, and Bacteroides fragilis. Blood cultures x2 remained negative. Recommendation from podiatry service for a postacute care rehabilitation stay secondary to inability to provide necessary care in his home environment. He was discharged to the subacute rehabilitation facility to finish his course of antibiotics which consisted of metronidazole and cephalexin. When asked, he has no concerns for me during the visit. He denies having discomfort in his right heel. He confirms taking insulin glargine twice a day with as needed short acting insulin. His wife confirms that he is not taking iron supplementation at home.     HISTORY:  Past Medical History:   Diagnosis Date   • Amputated below knee, left ([address]) 10/4/2018   • Anxiety    • Anxiety and depression    • Cataract    • Congestive cardiac failure ([address])    • Coronary artery disease    • Depression    • Diabetes mellitus ([address])    • Diabetic autonomic neuropathy associated with type 2 diabetes mellitus ([address])     Last Assessed: 12/28/2017   • History of DVT (deep vein thrombosis) left LE   • Hyperlipidemia    • Lymphedema of right lower extremity    • Obesity    • Orthostatic hypotension      Past Surgical History:   Procedure Laterality Date   • CORONARY ARTERY BYPASS GRAFT     • EYE SURGERY      cataracts bilateral   • LEG AMPUTATION THROUGH LOWER TIBIA AND FIBULA Left 8/3/2018    Procedure: AMPUTATION BELOW KNEE (BKA) L BKA;  Surgeon: Bertin Underwood MD;  Location: [facility];  Service: Vascular   • [place] CORONARY ARTERY BYP W/VEIN & ARTERY GRAFT 4 VEIN N/A 3/28/2018    Procedure: CORONARY ARTERY BYPASS GRAFT (CABG) x3 VESSELS, LIMA TO LAD, SVG--> PDA, SVG--> OM2,  RIGHT LEG EVH/SVH TO , INTRA-OP AMANDEEP;  Surgeon: Lili Walters MD;  Location: [facility];  Service: Cardiac Surgery   • ROTATOR CUFF REPAIR Bilateral      Family History   Problem Relation Age of Onset   • Atrial fibrillation Mother    • Stroke Mother    • Hypertension Father    • Heart attack Paternal Grandfather 61   • Diabetes Maternal Grandmother    • Diabetes Maternal Grandfather    • Diabetes Maternal Aunt    • Diabetes Maternal Uncle      Social History     Socioeconomic History   • Marital status: /Civil Union     Spouse name: Not on file   • Number of children: Not on file   • Years of education: Not on file   • Highest education level: Not on file   Occupational History   • Not on file   Tobacco Use   • Smoking status: Former     Packs/day: 1.00     Years: 12.00     Total pack years: 12.00     Types: Cigarettes     Quit date: 3/23/1994     Years since quittin.4   • Smokeless tobacco: Never   Vaping Use   • Vaping Use: Never used   Substance and Sexual Activity   • Alcohol use: No     Comment: rarely   • Drug use: No   • Sexual activity: Not Currently   Other Topics Concern   • Not on file   Social History Narrative   • Not on file     Social Determinants of Health     Financial Resource Strain: Not on file   Food Insecurity: No Food Insecurity (2023)    Hunger Vital Sign    • Worried About Running Out of Food in the Last Year: Never true    • Ran Out of Food in the Last Year: Never true   Transportation Needs: No Transportation Needs (4/19/2023)    PRAPARE - Transportation    • Lack of Transportation (Medical): No    • Lack of Transportation (Non-Medical): No   Physical Activity: Not on file   Stress: Not on file   Social Connections: Moderately Isolated (10/29/2020)    Social Connection and Isolation Panel [NHANES]    • Frequency of Communication with Friends and Family: Three times a week    • Frequency of Social Gatherings with Friends and Family: Three times a week    • Attends Mosque Services: Never    • Active Member of Clubs or Organizations: No    • Attends Club or Organization Meetings: Never    • Marital Status:    Intimate Partner Violence: Not At Risk (10/29/2020)    Humiliation, Afraid, Rape, and Kick questionnaire    • Fear of Current or Ex-Partner: No    • Emotionally Abused: No    • Physically Abused: No    • Sexually Abused: No   Housing Stability: Low Risk  (8/30/2023)    Housing Stability Vital Sign    • Unable to Pay for Housing in the Last Year: No    • Number of [address] in the Last Year: 1    • Unstable Housing in the Last Year: No       Allergies:  No Known Allergies    Review of Systems     Review of Systems   Respiratory: Negative for shortness of breath. Cardiovascular: Negative for chest pain. Gastrointestinal: Negative for constipation. Neurological:        Neuropathy with numbness and hands and foot       Medications and orders     All medications reviewed and updated in intermediate EMR. Objective     Vitals: Weight 260 pounds, temperature 97.7 °F, pulse 77, blood pressure 146/63, pulse oximetry 95% on room air, fasting fingerstick blood sugar 142. Physical Exam  Vitals reviewed. Constitutional:       General: He is awake. He is not in acute distress. Appearance: He is well-developed. He is not toxic-appearing or diaphoretic.       Comments: He appears comfortable in his wheelchair, stated age, and healthy. Cardiovascular:      Rate and Rhythm: Normal rate and regular rhythm. Heart sounds: Normal heart sounds. No murmur heard. No friction rub. No gallop. Comments: There is lymphedema in his right lower extremity  Pulmonary:      Effort: Pulmonary effort is normal. No respiratory distress. Breath sounds: Normal breath sounds. No stridor. No wheezing, rhonchi or rales. Musculoskeletal:      Comments: Left leg prosthesis present   Skin:     Comments: Dressing is in place on right foot/ankle   Neurological:      Mental Status: He is alert. Comments: Cognition grossly intact   Psychiatric:         Mood and Affect: Mood normal.         Behavior: Behavior normal. Behavior is cooperative. Pertinent Laboratory/Diagnostic Studies: The following labs/studies were reviewed please see chart or hospital paperwork for details.   Lab Results   Component Value Date    WBC 8.30 08/31/2023    HGB 12.2 08/31/2023    HCT 40.1 08/31/2023    MCV 86 08/31/2023     08/31/2023     Lab Results   Component Value Date    SODIUM 135 08/31/2023    K 4.1 08/31/2023    CL 97 08/31/2023    CO2 33 (H) 08/31/2023    BUN 17 08/31/2023    CREATININE 1.08 08/31/2023    GLUC 102 08/31/2023    CALCIUM 8.8 08/31/2023     Lab Results   Component Value Date    HGBA1C 6.7 (A) 02/02/2023 August 30, 2023:    MRI of right ankle/heel without contrast:    FINDINGS:     Subcutaneous Tissues:  Diffuse edema.     Similar posterior, slightly medial wound.     New 3.8 x 4.1 cm length by width superficial ulcer at the plantar posterior heel.     No sinus tract, fluid collection or evidence of foreign body.     Joint Effusion: No significant ankle or subtalar effusions.     TENDONS:  Achilles tendon: Normal.  Peroneus brevis and longus: Normal.  Posterior tibialis, flexor digitorum longus, flexor hallucis longus: Normal.  Anterior tibialis, extensor digitorum longus, extensor hallucis longus:  Normal.     LIGAMENTS:  Lateral collateral ligament complex: Intact. Distal tibiofibular syndesmosis: Intact. Medial collateral ligament complex: Intact.     Plantar Fascia: Normal signal intensity and thickness. Mild focal soft tissue edema around the medial bundle calcaneal attachment may be secondary to superficial wound in this location described above.     Articular Surfaces: Within normal limits.     Sinus Tarsi: Within normal limits.     Tarsal Tunnel: Within normal limits.     Bones:  Minimal nonspecific subcortical marrow edema at the plantar calcaneus, extends approximately 0.4 cm from the plantar cortical surface. No confluent T1 hypointensity to diagnose osteomyelitis. .     Otherwise normal marrow signal intensity. No fracture or malalignment.     Musculature: Fatty atrophy in keeping with diabetes. Diffuse edema, likely acute atrophy. No focal findings.     IMPRESSION:     Mild, nonspecific plantar calcaneal marrow edema without specific findings of osteomyelitis.     No abscess.     Other nonemergent findings above    February 6, 2020:    2D transthoracic echocardiogram:    SUMMARY     PROCEDURE INFORMATION:  This was a technically difficult study. Echocardiographic views were limited due to restricted patient mobility, poor patient compliance, and decreased penetration. Intravenous contrast ( 1.4 ml Definity in nss) was administered. Intravenous contrast was administered to opacify the left ventricle and enhance Doppler signals.     LEFT VENTRICLE:  Systolic function was normal.  This study was inadequate for the evaluation of regional wall motion. Wall thickness was moderately increased. There was moderate concentric hypertrophy.   Doppler parameters were consistent with abnormal left ventricular relaxation (grade 1 diastolic dysfunction).     AORTA:  The root exhibited mild dilatation.     August 29, 2021:    Twelve-lead ECG:    Narrative & Impression     Age and gender specific ECG analysis   Normal sinus rhythm  Poor anterior R wave progression is noted  Nonspecific ST-t wave changes  Abnormal ECG  When compared with ECG of 05-FEB-2020 00:19,  T wave inversion less evident in Lateral leads  Confirmed by Mansi Cota ([id number]) on 8/30/2021 6:18:00 AM     - His order summary was reviewed and signed. Portions of the record may have been created with voice recognition software. Occasional wrong word or "sound a like" substitutions may have occurred due to the inherent limitations of voice recognition software. Read the chart carefully and recognize, using context, where substitutions have occurred.     Nilesh Cruz M.D.  9/7/2023 9:59 AM

## 2023-09-06 ENCOUNTER — TELEPHONE (OUTPATIENT)
Dept: CARDIAC SURGERY | Facility: CLINIC | Age: 63
End: 2023-09-06

## 2023-09-06 NOTE — TELEPHONE ENCOUNTER
[facility] called for an apt within 1-3 weeks. Dr. Wiley Doing apt's are booked until 10/24. Unable to come in today. Transferred to [address] [place] [address] to see if they had anything sooner. Was recently in the ED. Last seen back in 2018.

## 2023-09-06 NOTE — UTILIZATION REVIEW
NOTIFICATION OF ADMISSION DISCHARGE   This is a Notification of Discharge from [address] [place] [address]. Please be advised that this patient has been discharge from our facility. Below you will find the admission and discharge date and time including the patient’s disposition. UTILIZATION REVIEW CONTACT:  jL Wright  Utilization   Network Utilization Review Department  Phone: [phone] x carefully listen to the prompts. All voicemails are confidential.  Email: [email or web address]     ADMISSION INFORMATION  PRESENTATION DATE: 8/29/2023 10:56 AM  OBERVATION ADMISSION DATE: INPATIENT ADMISSION DATE: 8/29/23  1:00 PM   DISCHARGE DATE: 9/2/2023 10:59 AM   DISPOSITION:Non [facility] SNF/TCU/SNU    IMPORTANT INFORMATION:  Send all requests for admission clinical reviews, approved or denied determinations and any other requests to dedicated fax number below belonging to the campus where the patient is receiving treatment.  List of dedicated fax numbers:  [place] DENIALS (Administrative/Medical Necessity) [phone] 230[facility] (Maternity/NICU/Pediatrics) [phone]   [facility] [phone]   [facility] [phone] 1636 [facility] [phone] [address] [facility] [phone]   [facility] [phone]   [address] [place] [address] [address] [place] [address] [phone] 506 [facility] [phone] 3[address] [facility] [phone] 2720 [facility] 3000 32[facility] [phone]

## 2023-09-07 ENCOUNTER — NURSING HOME VISIT (OUTPATIENT)
Dept: GERIATRICS | Facility: OTHER | Age: 63
End: 2023-09-07
Payer: COMMERCIAL

## 2023-09-07 VITALS
HEART RATE: 74 BPM | DIASTOLIC BLOOD PRESSURE: 60 MMHG | OXYGEN SATURATION: 97 % | TEMPERATURE: 97.2 F | SYSTOLIC BLOOD PRESSURE: 120 MMHG

## 2023-09-07 DIAGNOSIS — I89.0 LYMPHEDEMA OF RIGHT LOWER EXTREMITY: ICD-10-CM

## 2023-09-07 DIAGNOSIS — Z79.4 TYPE 2 DIABETES MELLITUS WITH DIABETIC NEUROPATHY, WITH LONG-TERM CURRENT USE OF INSULIN (HCC): ICD-10-CM

## 2023-09-07 DIAGNOSIS — I50.32 CHRONIC HEART FAILURE WITH PRESERVED EJECTION FRACTION (HCC): ICD-10-CM

## 2023-09-07 DIAGNOSIS — E11.621 DIABETIC ULCER OF RIGHT HEEL ASSOCIATED WITH TYPE 2 DIABETES MELLITUS, WITH MUSCLE INVOLVEMENT WITHOUT EVIDENCE OF NECROSIS (HCC): Primary | ICD-10-CM

## 2023-09-07 DIAGNOSIS — E11.40 TYPE 2 DIABETES MELLITUS WITH DIABETIC NEUROPATHY, WITH LONG-TERM CURRENT USE OF INSULIN (HCC): ICD-10-CM

## 2023-09-07 DIAGNOSIS — L97.415 DIABETIC ULCER OF RIGHT HEEL ASSOCIATED WITH TYPE 2 DIABETES MELLITUS, WITH MUSCLE INVOLVEMENT WITHOUT EVIDENCE OF NECROSIS (HCC): Primary | ICD-10-CM

## 2023-09-07 PROBLEM — M86.172 OTHER ACUTE OSTEOMYELITIS, LEFT ANKLE AND FOOT (HCC): Status: RESOLVED | Noted: 2020-10-29 | Resolved: 2023-09-07

## 2023-09-07 PROBLEM — S92.919A FRACTURE, TOE: Status: RESOLVED | Noted: 2023-04-24 | Resolved: 2023-09-07

## 2023-09-07 PROBLEM — S88.112A: Status: RESOLVED | Noted: 2018-10-04 | Resolved: 2023-09-07

## 2023-09-07 PROCEDURE — 99309 SBSQ NF CARE MODERATE MDM 30: CPT

## 2023-09-07 RX ORDER — MIDODRINE HYDROCHLORIDE 2.5 MG/1
2.5 TABLET ORAL
COMMUNITY

## 2023-09-07 NOTE — PROGRESS NOTES
[facility]  316 [facility], 400 [facility] 635   Rehab: POS 31    NAME: Elias Estes  AGE: 58 y.o. SEX: male  : 1960     DATE: 2023    CODE STATUS: CPR     Assessment and Plan:     Problem List Items Addressed This Visit        Endocrine    Type 2 diabetes mellitus with diabetic neuropathy, with long-term current use of insulin ([address])       Lab Results   Component Value Date    HGBA1C 6.7 (A) 2023   BS log reviewed, BS trending 115-175  Continue lantus 40 u BID  Lispro 5 u QID for BS >250  Continue accuchecks          Diabetic ulcer of right heel (HCC) - Primary       Lab Results   Component Value Date    HGBA1C 6.7 (A) 2023   follows with Dr. Jose Maria Chi local wound care  NWB RLE  Inpatient MRI negative for OM  Completed 7 day course abx  F/u with Dr. Pipe Almaguer on             Cardiovascular and Mediastinum    Chronic heart failure with preserved ejection fraction (HCC)     Wt Readings from Last 3 Encounters:   23 118 kg (259 lb 7.7 oz)   23 118 kg (260 lb)   23 125 kg (275 lb 9.2 oz)     7 lb weight gain per nursing, spoke with patient he believes his previous weight was without his prosthetic leg and today's weight was with his prosthetic leg accounting for weight difference  Continue daily weights WITH prosthetic leg   No outward signs of overload   Continue lasix 40 mg daily   Routine BMP monitoring                 Other    Lymphedema of right lower extremity     Chronic   Will order ace wrap to RLE               History of Present Illness:     Patient is a 58 yr old male being seen at [facility] for follow up of acute and chronic medical conditions. He was recently hospitalized on  d/t concern for osteomyelitis of right heel wound. He had previous admission for RLE cellulitis and PAD at which time he was evaluated by vascular surgery and noted to have high risk of limb loss in the future. Inpatient  X-ray revealed suspected calcaneal fracture, large plantar ulcer over posterior calcaneous. MRI foot without evidence of osteomyelitis. BCID without any findings. He received 3 days IV of Flagyl and Ancef and transitioned to oral Keflex and Flagyl. He was admitted to [facility] for PT/OT services. On exam today he is doing well. No complaints of CP or SOB. He was noted to have 7 lb weight gain while at [facility], however this was likely due to being weighed with and without his prosthesis. He has no concerns at this time. The following portions of the patient's history were reviewed and updated as appropriate: allergies, current medications, past family history, past medical history, past social history, past surgical history and problem list.     Review of Systems:     Review of Systems   Cardiovascular: Positive for leg swelling. Chronic   Skin: Positive for wound.         Problem List:     Patient Active Problem List   Diagnosis   • Anxiety and depression   • Type 2 diabetes mellitus with diabetic neuropathy, with long-term current use of insulin ([address])   • Hyperlipidemia   • 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   • Vitamin D deficiency   • PAD (peripheral artery disease) ([facility])   • S/P BKA (below knee amputation), left ([facility])   • Orthostatic hypotension   • Diabetic gastroparesis ([facility])   • Class 2 severe obesity due to excess calories with serious comorbidity and body mass index (BMI) of 35.0 to 35.9 in adult [facility])   • Triple vessel coronary artery disease   • Hypertensive heart disease with congestive heart failure ([facility])   • Chronic heart failure with preserved ejection fraction ([facility])   • Other constipation   • S/P CABG x 3   • Diabetic autonomic neuropathy associated with type 2 diabetes mellitus ([facility])   • Hyponatremia   • Obstructive sleep apnea   • Phantom limb syndrome without pain ([facility])   • CAD (coronary artery disease)   • Nodular rash   • Elephantiasis [place]   • Embolism and thrombosis of arteries of the lower extremities (HCC)   • Lymphedema of right lower extremity   • Venous stasis dermatitis of right lower extremity   • Abdominal distension   • Non-pressure chronic ulcer of right calf with fat layer exposed ([address])   • Cellulitis of right ankle   • Lymphedema in adult patient   • Diabetic ulcer of right heel (HCC)        Objective:     /60   Pulse 74   Temp (!) 97.2 °F (36.2 °C)   SpO2 97%     Physical Exam  Vitals and nursing note reviewed. Constitutional:       General: He is not in acute distress. Appearance: He is well-developed. HENT:      Head: Normocephalic and atraumatic. Eyes:      Conjunctiva/sclera: Conjunctivae normal.   Cardiovascular:      Rate and Rhythm: Normal rate and regular rhythm. Heart sounds: No murmur heard. Pulmonary:      Effort: Pulmonary effort is normal. No respiratory distress. Breath sounds: Normal breath sounds. Abdominal:      Palpations: Abdomen is soft. Tenderness: There is no abdominal tenderness. Musculoskeletal:      Cervical back: Neck supple. Right lower leg: Edema present. Left Lower Extremity: Left leg is amputated below knee. Skin:     General: Skin is warm and dry. Capillary Refill: Capillary refill takes less than 2 seconds. Findings: Wound present. Comments: Skin scaling and thickening RLE   Neurological:      General: No focal deficit present. Mental Status: He is alert and oriented to person, place, and time. Mental status is at baseline. Motor: Weakness present. Psychiatric:         Mood and Affect: Mood normal.         Pertinent Laboratory/Diagnostic Studies:    Laboratory Results: I have personally reviewed the pertinent laboratory results/reports     Radiology/Other Diagnostic Testing Results: I have personally reviewed pertinent reports.       Cesar Ramachandran, [facility]  Geriatric Medicine

## 2023-09-07 NOTE — ASSESSMENT & PLAN NOTE
· We will continue his prior to admission midodrine 3 times daily with meals with holding parameter for greater than 814 systolic blood pressure and instructions to remain upright for 1 hour after dosage   · We will continue with monitoring for changes condition  · He will follow-up with his PCP upon discharge

## 2023-09-07 NOTE — ASSESSMENT & PLAN NOTE
Wt Readings from Last 3 Encounters:   09/02/23 118 kg (259 lb 7.7 oz)   07/31/23 118 kg (260 lb)   07/21/23 125 kg (275 lb 9.2 oz)     7 lb weight gain per nursing, spoke with patient he believes his previous weight was without his prosthetic leg and today's weight was with his prosthetic leg accounting for weight difference  Continue daily weights WITH prosthetic leg   No outward signs of overload   Continue lasix 40 mg daily   Routine BMP monitoring

## 2023-09-07 NOTE — ASSESSMENT & PLAN NOTE
Lab Results   Component Value Date    HGBA1C 6.7 (A) 02/02/2023   · Will continue as prior to admission insulin glargine 40 units twice daily and add lispro insulin 5 units 4 times daily for a blood sugar greater than 250  · He will follow-up with his PCP upon discharge

## 2023-09-07 NOTE — ASSESSMENT & PLAN NOTE
· Wound culture E. coli, Proteus mirabilis, Bacteroides fragilis  · He is doing well after recent hospitalization  Secondary to his decreased level of functioning from baseline, he will be admitted to the subacute rehabilitation facility and seen in consultation by a multidisciplinary rehabilitation team for evaluation and treatment to assist him in returning to his prior level of functioning  We will continue his care in collaboration with his podiatry service  Nursing staff to continue with local wound care  We will continue with monitoring for change in his condition  He will follow-up with his PCP and podiatry services upon discharge

## 2023-09-07 NOTE — ASSESSMENT & PLAN NOTE
Lab Results   Component Value Date    HGBA1C 6.7 (A) 02/02/2023   follows with Dr. Volodymyr Jiang local wound care  NWB RLE  Inpatient MRI negative for OM  Completed 7 day course abx  F/u with Dr. Martha Juarez on 9/19

## 2023-09-07 NOTE — ASSESSMENT & PLAN NOTE
Secondary to his decreased level of functioning from baseline, he will be admitted to the subacute rehabilitation facility and seen in consultation by a multidisciplinary rehabilitation team for evaluation and treatment to assist him in returning to his prior level of functioning  We will continue with monitoring for change in his condition  He will follow-up with his PCP upon discharge

## 2023-09-07 NOTE — ASSESSMENT & PLAN NOTE
· We will continue his prior to admission furosemide  · We will have his weight monitor daily with nursing staff advised to call if his weight is less than 255 or more than 265  · He will follow-up with his PCP upon discharge

## 2023-09-07 NOTE — ASSESSMENT & PLAN NOTE
Chronic   Will order ace wrap to RLE Subjective:       Patient ID: Isha Leonard is a 64 y.o. female.    Chief Complaint: Sinusitis and URI    [facility] appt    Sinus symptoms for a couple of weeks.  Some left ear discomfort.  No fever, chills or sweats.  No shortness of breath or chest pain.    Initially blood pressure slightly elevated but on recheck was normal.  She states at home usually runs 120/70 range.    On onychoycosis and she would like to see Podiatry for this.    Patient Active Problem List:     Other hyperlipidemia     Acquired hypothyroidism     AR (allergic rhinitis)     Glucose intolerance (impaired glucose tolerance)     Intramural leiomyoma of uterus     Gallstones: see ultrasound 2014; CT 2017     Colon adenoma: 2011- also 3/17: 5 year follow up recommended     Fatty liver: see u/s 2014; stable CT 2017     Midline cystocele     Rectus diastasis     Urinary, incontinence, stress female     Diverticulosis of large intestine without hemorrhage: see CT scan May 2017     Bilateral renal cysts: see CT scan May 2017     Osteopenia of multiple sites: see DEXA 2/18     Mild vitamin D deficiency      Review of Systems   Constitutional: Positive for fatigue. Negative for chills and fever.   HENT: Positive for congestion, ear pain and postnasal drip.    Eyes: Negative.    Respiratory: Positive for cough. Negative for chest tightness, shortness of breath and wheezing.    Cardiovascular: Negative.    Gastrointestinal: Negative.        Objective:      Physical Exam   Constitutional: She is oriented to person, place, and time. She appears well-developed and well-nourished.   HENT:   Head: Normocephalic and atraumatic.   Right Ear: External ear normal.   Left Ear: External ear normal.   Mouth/Throat: Oropharynx is clear and moist.   TMs dull L > R   Eyes: Conjunctivae are normal.   Neck: Normal range of motion. Neck supple. No thyromegaly present.   Well healed scar   Cardiovascular: Normal rate, regular rhythm and normal heart sounds.   Pulmonary/Chest:  No respiratory distress. She has no wheezes. She has no rales.   Abdominal: Soft. Bowel sounds are normal.   Musculoskeletal: She exhibits no edema.   Lymphadenopathy:     She has no cervical adenopathy.   Neurological: She is alert and oriented to person, place, and time.   Skin: Skin is warm and dry. No rash noted. No erythema.       Assessment:       1. Acute bacterial sinusitis    2. Onychomycosis        Plan:         Isha was seen today for sinusitis and uri.    Diagnoses and all orders for this visit:    Acute bacterial sinusitis  -     amoxicillin-clavulanate 875-125mg (AUGMENTIN) 875-125 mg per tablet; Take 1 tablet by mouth 2 (two) times daily.    Onychomycosis  -     Ambulatory referral to Podiatry    Rest, fluids, acetaminophen, mucinex and follow up poor results.    Low salt diet, exercise, 5-10-# weight loss in next 6-12 months; time.  Call if BP > 130/80 on a regular basis.    Keep appointment for lab work this summer to follow up on thyroid and lipids; keep appointment for abdominal ultrasound to follow-up on fatty liver and gallstones

## 2023-09-07 NOTE — ASSESSMENT & PLAN NOTE
· We will continue his prior to admission aspirin, clopidogrel, and atorvastatin for secondary prevention  · We will continue with monitoring for change in his condition  · He will follow-up with his PCP upon discharge

## 2023-09-07 NOTE — ASSESSMENT & PLAN NOTE
Lab Results   Component Value Date    HGBA1C 6.7 (A) 02/02/2023   BS log reviewed, BS trending 115-175  Continue lantus 40 u BID  Lispro 5 u QID for BS >250  Continue accuchecks

## 2023-09-07 NOTE — ASSESSMENT & PLAN NOTE
· We will continue his prior to admission once daily metoclopramide (confirmed medication with his wife)  · We will continue to monitor for change in his condition  · He will follow-up with his PCP upon discharge

## 2023-09-11 ENCOUNTER — NURSING HOME VISIT (OUTPATIENT)
Dept: GERIATRICS | Facility: OTHER | Age: 63
End: 2023-09-11
Payer: COMMERCIAL

## 2023-09-11 VITALS
SYSTOLIC BLOOD PRESSURE: 118 MMHG | HEART RATE: 66 BPM | TEMPERATURE: 98.1 F | DIASTOLIC BLOOD PRESSURE: 57 MMHG | OXYGEN SATURATION: 98 %

## 2023-09-11 DIAGNOSIS — I50.32 CHRONIC HEART FAILURE WITH PRESERVED EJECTION FRACTION (HCC): ICD-10-CM

## 2023-09-11 DIAGNOSIS — I89.0 LYMPHEDEMA OF RIGHT LOWER EXTREMITY: ICD-10-CM

## 2023-09-11 DIAGNOSIS — L97.415 DIABETIC ULCER OF RIGHT HEEL ASSOCIATED WITH TYPE 2 DIABETES MELLITUS, WITH MUSCLE INVOLVEMENT WITHOUT EVIDENCE OF NECROSIS (HCC): Primary | ICD-10-CM

## 2023-09-11 DIAGNOSIS — R19.5 LOOSE STOOLS: ICD-10-CM

## 2023-09-11 DIAGNOSIS — E11.621 DIABETIC ULCER OF RIGHT HEEL ASSOCIATED WITH TYPE 2 DIABETES MELLITUS, WITH MUSCLE INVOLVEMENT WITHOUT EVIDENCE OF NECROSIS (HCC): Primary | ICD-10-CM

## 2023-09-11 PROBLEM — K59.09 OTHER CONSTIPATION: Status: RESOLVED | Noted: 2018-03-26 | Resolved: 2023-09-11

## 2023-09-11 PROCEDURE — 99309 SBSQ NF CARE MODERATE MDM 30: CPT

## 2023-09-11 RX ORDER — SACCHAROMYCES BOULARDII 250 MG
250 CAPSULE ORAL 2 TIMES DAILY
Qty: 28 CAPSULE | Refills: 0
Start: 2023-09-11 | End: 2023-09-25

## 2023-09-11 NOTE — ASSESSMENT & PLAN NOTE
New since admission to SNF  Reports 3 loose stools yesterday and 2 loose stools the day before   Not currently on any stool softeners   Will order florastor 250 mg BID x 2 weeks   Encourage po hydration

## 2023-09-11 NOTE — ASSESSMENT & PLAN NOTE
Lab Results   Component Value Date    HGBA1C 6.7 (A) 02/02/2023   follows with Dr. Mila Zuniga local wound care  NWB RLE  Inpatient MRI negative for OM  Completed 7 day course abx  F/u with Dr. Caroline Cristina on 9/19

## 2023-09-11 NOTE — ASSESSMENT & PLAN NOTE
Wt Readings from Last 3 Encounters:   09/02/23 118 kg (259 lb 7.7 oz)   07/31/23 118 kg (260 lb)   07/21/23 125 kg (275 lb 9.2 oz)     Weight stable   No acute signs of overload  Continue daily weight with prosthesis   Continue lasix 40 mg daily   Monitor BMP

## 2023-09-11 NOTE — PROGRESS NOTES
[facility]  [address], [address]  [facility]: [address]    NAME: Bill Green  AGE: 58 y.o. SEX: male  : 1960     DATE: 2023    CODE STATUS: CPR     Assessment and Plan:     Problem List Items Addressed This Visit        Endocrine    Diabetic ulcer of right heel ([address]) - Primary       Lab Results   Component Value Date    HGBA1C 6.7 (A) 2023   follows with Dr. Ramirez Phlegm local wound care  NWB RLE  Inpatient MRI negative for OM  Completed 7 day course abx  F/u with Dr. Mark Quintana on             Cardiovascular and Mediastinum    Chronic heart failure with preserved ejection fraction (HCC)     Wt Readings from Last 3 Encounters:   23 118 kg (259 lb 7.7 oz)   23 118 kg (260 lb)   23 125 kg (275 lb 9.2 oz)     Weight stable   No acute signs of overload  Continue daily weight with prosthesis   Continue lasix 40 mg daily   Monitor BMP            Other    Lymphedema of right lower extremity     Chronic  Continue ace wrap to RLE         Loose stools     New since admission to [facility]  Reports 3 loose stools yesterday and 2 loose stools the day before   Not currently on any stool softeners   Will order florastor 250 mg BID x 2 weeks   Encourage po hydration          Relevant Medications    saccharomyces boulardii (FLORASTOR) 250 mg capsule          History of Present Illness:     Patient is a 58 yr old male being seen at [facility] for follow up of acute and chronic medical conditions. He was recently hospitalized on  d/t concern for osteomyelitis of right heel wound. He had previous admission for RLE cellulitis and PAD at which time he was evaluated by vascular surgery and noted to have high risk of limb loss in the future. Inpatient  X-ray revealed suspected calcaneal fracture, large plantar ulcer over posterior calcaneous. MRI foot without evidence of osteomyelitis. BCID without any findings.  He received 3 days IV of Flagyl and Ancef and transitioned to oral Keflex and Flagyl. He was admitted to [facility] for PT/OT services. On exam today he is doing well. No complaints of CP or SOB. Daily weights reviewed and are stable. He does report loose stools since being admitted to SNF. He denies diarrhea. He reports 3 loose bowel movements yesterday. The following portions of the patient's history were reviewed and updated as appropriate: allergies, current medications, past family history, past medical history, past social history, past surgical history and problem list.     Review of Systems:     Review of Systems   Cardiovascular: Positive for leg swelling. Chronic; RLE   Skin: Positive for wound.         R heel        Problem List:     Patient Active Problem List   Diagnosis   • Anxiety and depression   • Type 2 diabetes mellitus with diabetic neuropathy, with long-term current use of insulin ([address])   • Hyperlipidemia   • 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   • Vitamin D deficiency   • PAD (peripheral artery disease) ([facility])   • S/P BKA (below knee amputation), left ([facility])   • Orthostatic hypotension   • Diabetic gastroparesis ([facility])   • Class 2 severe obesity due to excess calories with serious comorbidity and body mass index (BMI) of 35.0 to 35.9 in adult [facility])   • Triple vessel coronary artery disease   • Hypertensive heart disease with congestive heart failure ([facility])   • Chronic heart failure with preserved ejection fraction ([facility])   • S/P CABG x 3   • Diabetic autonomic neuropathy associated with type 2 diabetes mellitus ([facility])   • Hyponatremia   • Obstructive sleep apnea   • Phantom limb syndrome without pain ([facility])   • CAD (coronary artery disease)   • Nodular rash   • Elephantiasis nostras verrucosa   • Embolism and thrombosis of arteries of the lower extremities ([facility])   • Lymphedema of right lower extremity   • Venous stasis dermatitis of right lower extremity   • Abdominal distension   • Non-pressure chronic ulcer of right calf with fat layer exposed ([address])   • Cellulitis of right ankle   • Lymphedema in adult patient   • Diabetic ulcer of right heel (HCC)   • Loose stools        Objective:     /57   Pulse 66   Temp 98.1 °F (36.7 °C)   SpO2 98%     Physical Exam  Vitals and nursing note reviewed. Constitutional:       General: He is not in acute distress. Appearance: He is well-developed. HENT:      Head: Normocephalic and atraumatic. Eyes:      Conjunctiva/sclera: Conjunctivae normal.   Cardiovascular:      Rate and Rhythm: Normal rate and regular rhythm. Heart sounds: No murmur heard. Pulmonary:      Effort: Pulmonary effort is normal. No respiratory distress. Breath sounds: Normal breath sounds. Abdominal:      Palpations: Abdomen is soft. Tenderness: There is no abdominal tenderness. Musculoskeletal:      Cervical back: Neck supple. Right lower leg: Edema present. Left Lower Extremity: Left leg is amputated below knee. Skin:     General: Skin is warm and dry. Capillary Refill: Capillary refill takes less than 2 seconds. Findings: Wound present. Comments: Skin scaling and thickening RLE   Neurological:      General: No focal deficit present. Mental Status: He is alert and oriented to person, place, and time. Mental status is at baseline. Psychiatric:         Mood and Affect: Mood normal.         Behavior: Behavior normal.         Pertinent Laboratory/Diagnostic Studies:    Laboratory Results: I have personally reviewed the pertinent laboratory results/reports     Radiology/Other Diagnostic Testing Results: I have personally reviewed pertinent reports.       Wayne Vasquez, [address] [facility]

## 2023-09-14 ENCOUNTER — NURSING HOME VISIT (OUTPATIENT)
Dept: GERIATRICS | Facility: OTHER | Age: 63
End: 2023-09-14
Payer: COMMERCIAL

## 2023-09-14 VITALS
OXYGEN SATURATION: 98 % | HEART RATE: 66 BPM | DIASTOLIC BLOOD PRESSURE: 72 MMHG | TEMPERATURE: 97.1 F | SYSTOLIC BLOOD PRESSURE: 146 MMHG

## 2023-09-14 DIAGNOSIS — L97.415 DIABETIC ULCER OF RIGHT HEEL ASSOCIATED WITH TYPE 2 DIABETES MELLITUS, WITH MUSCLE INVOLVEMENT WITHOUT EVIDENCE OF NECROSIS (HCC): Primary | ICD-10-CM

## 2023-09-14 DIAGNOSIS — E11.621 DIABETIC ULCER OF RIGHT HEEL ASSOCIATED WITH TYPE 2 DIABETES MELLITUS, WITH MUSCLE INVOLVEMENT WITHOUT EVIDENCE OF NECROSIS (HCC): Primary | ICD-10-CM

## 2023-09-14 DIAGNOSIS — I50.32 CHRONIC HEART FAILURE WITH PRESERVED EJECTION FRACTION (HCC): ICD-10-CM

## 2023-09-14 DIAGNOSIS — E11.40 TYPE 2 DIABETES MELLITUS WITH DIABETIC NEUROPATHY, WITH LONG-TERM CURRENT USE OF INSULIN (HCC): ICD-10-CM

## 2023-09-14 DIAGNOSIS — I89.0 LYMPHEDEMA OF RIGHT LOWER EXTREMITY: ICD-10-CM

## 2023-09-14 DIAGNOSIS — Z79.4 TYPE 2 DIABETES MELLITUS WITH DIABETIC NEUROPATHY, WITH LONG-TERM CURRENT USE OF INSULIN (HCC): ICD-10-CM

## 2023-09-14 PROCEDURE — 99309 SBSQ NF CARE MODERATE MDM 30: CPT

## 2023-09-14 NOTE — ASSESSMENT & PLAN NOTE
Wt Readings from Last 3 Encounters:   09/02/23 118 kg (259 lb 7.7 oz)   07/31/23 118 kg (260 lb)   07/21/23 125 kg (275 lb 9.2 oz)     Stable   No overt signs of overload  Continue daily weights with prosthesis   Continue lasix   Monitor renal function

## 2023-09-14 NOTE — PROGRESS NOTES
[facility]  [address], [address]  [facility]: [address]    NAME: Annmarie Mcgill  AGE: 58 y.o. SEX: male  : 1960     DATE: 2023    CODE STATUS: CPR     Assessment and Plan:     Problem List Items Addressed This Visit        Endocrine    Type 2 diabetes mellitus with diabetic neuropathy, with long-term current use of insulin ([address])       Lab Results   Component Value Date    HGBA1C 6.7 (A) 2023   BS log reviewed  Continue lantus   Continue prn lispro 5 u QID BS >250  Continue accuchecks          Diabetic ulcer of right heel ([address]) - Primary       Lab Results   Component Value Date    HGBA1C 6.7 (A) 2023   follows with Dr. Maria Fernanda Rivera local wound care  NWB RLE- orthotic approved   Inpatient MRI negative for OM  Completed 7 day course abx  F/u with Dr. Aloysius Siemens on             Cardiovascular and Mediastinum    Chronic heart failure with preserved ejection fraction ([address])     Wt Readings from Last 3 Encounters:   23 118 kg (259 lb 7.7 oz)   23 118 kg (260 lb)   23 125 kg (275 lb 9.2 oz)     Stable   No overt signs of overload  Continue daily weights with prosthesis   Continue lasix   Monitor renal function            Other    Lymphedema of right lower extremity     Chronic  Discussed with Dr. Aloysius Siemens regarding use of ace wrap in the setting of diabetic heel ulcer- agreed to use of ace wrap for compression   Order placed for OT to apply lymphedema wraps  Consult lymphedema clinic for patient to follow with                History of Present Illness:     Patient is a 58 yr old male being seen at [facility] for follow up of acute and chronic medical conditions. He was recently hospitalized on  d/t concern for osteomyelitis of right heel wound.   He had previous admission for RLE cellulitis and PAD at which time he was evaluated by vascular surgery and noted to have high risk of limb loss in the future. Inpatient  X-ray revealed suspected calcaneal fracture, large plantar ulcer over posterior calcaneous. MRI foot without evidence of osteomyelitis. BCID without any findings. He received 3 days IV of Flagyl and Ancef and transitioned to oral Keflex and Flagyl. He was admitted to [facility] for PT/OT services. On exam today he is doing well. No complaints of CP or SOB. Continues with significant RLE edema d/t lymphedema. He reports orthotic for right foot to prevent pressure on right heel was approved through physical therapy and is awaiting its arrival.       The following portions of the patient's history were reviewed and updated as appropriate: allergies, current medications, past family history, past medical history, past social history, past surgical history and problem list.     Review of Systems:     Review of Systems   Cardiovascular: Positive for leg swelling. Chronic; RLE   Skin: Positive for wound.         R heel        Problem List:     Patient Active Problem List   Diagnosis   • Anxiety and depression   • Type 2 diabetes mellitus with diabetic neuropathy, with long-term current use of insulin ([address])   • Hyperlipidemia   • 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   • Vitamin D deficiency   • PAD (peripheral artery disease) ([facility])   • S/P BKA (below knee amputation), left ([facility])   • Orthostatic hypotension   • Diabetic gastroparesis ([facility])   • Class 2 severe obesity due to excess calories with serious comorbidity and body mass index (BMI) of 35.0 to 35.9 in adult [facility])   • Triple vessel coronary artery disease   • Hypertensive heart disease with congestive heart failure ([facility])   • Chronic heart failure with preserved ejection fraction ([facility])   • S/P CABG x 3   • Diabetic autonomic neuropathy associated with type 2 diabetes mellitus ([facility])   • Hyponatremia   • Obstructive sleep apnea   • Phantom limb syndrome without pain ([facility])   • CAD (coronary artery disease)   • Nodular rash   • Elephantiasis nostras verrucosa   • Embolism and thrombosis of arteries of the lower extremities (HCC)   • Lymphedema of right lower extremity   • Venous stasis dermatitis of right lower extremity   • Abdominal distension   • Non-pressure chronic ulcer of right calf with fat layer exposed ([address])   • Cellulitis of right ankle   • Lymphedema in adult patient   • Diabetic ulcer of right heel (HCC)   • Loose stools        Objective:     /72   Pulse 66   Temp (!) 97.1 °F (36.2 °C)   SpO2 98%     Physical Exam  Vitals and nursing note reviewed. Constitutional:       General: He is not in acute distress. Appearance: He is well-developed. HENT:      Head: Normocephalic and atraumatic. Eyes:      Conjunctiva/sclera: Conjunctivae normal.   Cardiovascular:      Rate and Rhythm: Normal rate and regular rhythm. Heart sounds: No murmur heard. Pulmonary:      Effort: Pulmonary effort is normal. No respiratory distress. Breath sounds: Normal breath sounds. Abdominal:      Palpations: Abdomen is soft. Tenderness: There is no abdominal tenderness. Musculoskeletal:      Cervical back: Neck supple. Right lower leg: Edema present. Left Lower Extremity: Left leg is amputated below knee. Skin:     General: Skin is warm and dry. Capillary Refill: Capillary refill takes less than 2 seconds. Findings: Wound present. Comments: Skin scaling and thickening RLE   Neurological:      General: No focal deficit present. Mental Status: He is alert and oriented to person, place, and time. Mental status is at baseline. Psychiatric:         Mood and Affect: Mood normal.         Behavior: Behavior normal.         Pertinent Laboratory/Diagnostic Studies:    Laboratory Results: I have personally reviewed the pertinent laboratory results/reports     Radiology/Other Diagnostic Testing Results: I have personally reviewed pertinent reports. Donte Mcqueen, [address] [facility]

## 2023-09-14 NOTE — ASSESSMENT & PLAN NOTE
Chronic  Discussed with Dr. Ruel Nova regarding use of ace wrap in the setting of diabetic heel ulcer- agreed to use of ace wrap for compression   Order placed for OT to apply lymphedema wraps  Consult lymphedema clinic for patient to follow with

## 2023-09-14 NOTE — ASSESSMENT & PLAN NOTE
Lab Results   Component Value Date    HGBA1C 6.7 (A) 02/02/2023   follows with Dr. Susy Garcia local wound care  NWB RLE- orthotic approved   Inpatient MRI negative for OM  Completed 7 day course abx  F/u with Dr. Mya Flor on 9/19

## 2023-09-14 NOTE — ASSESSMENT & PLAN NOTE
Lab Results   Component Value Date    HGBA1C 6.7 (A) 02/02/2023   BS log reviewed  Continue lantus   Continue prn lispro 5 u QID BS >250  Continue accuchecks

## 2023-09-17 DIAGNOSIS — Z95.1 S/P CABG (CORONARY ARTERY BYPASS GRAFT): ICD-10-CM

## 2023-09-17 DIAGNOSIS — Z89.512 S/P BKA (BELOW KNEE AMPUTATION), LEFT (HCC): ICD-10-CM

## 2023-09-18 ENCOUNTER — NURSING HOME VISIT (OUTPATIENT)
Dept: GERIATRICS | Facility: OTHER | Age: 63
End: 2023-09-18
Payer: COMMERCIAL

## 2023-09-18 VITALS
TEMPERATURE: 97.7 F | SYSTOLIC BLOOD PRESSURE: 132 MMHG | DIASTOLIC BLOOD PRESSURE: 64 MMHG | OXYGEN SATURATION: 99 % | HEART RATE: 76 BPM

## 2023-09-18 DIAGNOSIS — L97.415 DIABETIC ULCER OF RIGHT HEEL ASSOCIATED WITH TYPE 2 DIABETES MELLITUS, WITH MUSCLE INVOLVEMENT WITHOUT EVIDENCE OF NECROSIS (HCC): Primary | ICD-10-CM

## 2023-09-18 DIAGNOSIS — I50.32 CHRONIC HEART FAILURE WITH PRESERVED EJECTION FRACTION (HCC): ICD-10-CM

## 2023-09-18 DIAGNOSIS — I89.0 LYMPHEDEMA OF RIGHT LOWER EXTREMITY: ICD-10-CM

## 2023-09-18 DIAGNOSIS — E11.621 DIABETIC ULCER OF RIGHT HEEL ASSOCIATED WITH TYPE 2 DIABETES MELLITUS, WITH MUSCLE INVOLVEMENT WITHOUT EVIDENCE OF NECROSIS (HCC): Primary | ICD-10-CM

## 2023-09-18 DIAGNOSIS — Z79.4 TYPE 2 DIABETES MELLITUS WITH DIABETIC NEUROPATHY, WITH LONG-TERM CURRENT USE OF INSULIN (HCC): ICD-10-CM

## 2023-09-18 DIAGNOSIS — E11.40 TYPE 2 DIABETES MELLITUS WITH DIABETIC NEUROPATHY, WITH LONG-TERM CURRENT USE OF INSULIN (HCC): ICD-10-CM

## 2023-09-18 PROCEDURE — 99309 SBSQ NF CARE MODERATE MDM 30: CPT

## 2023-09-18 RX ORDER — PANTOPRAZOLE SODIUM 40 MG/1
TABLET, DELAYED RELEASE ORAL
Qty: 90 TABLET | Refills: 3 | OUTPATIENT
Start: 2023-09-18

## 2023-09-18 RX ORDER — CLOPIDOGREL BISULFATE 75 MG/1
TABLET ORAL
Qty: 90 TABLET | Refills: 3 | OUTPATIENT
Start: 2023-09-18

## 2023-09-18 NOTE — ASSESSMENT & PLAN NOTE
Chronic  Discussed with Dr. Ruel Nova regarding use of ace wrap in the setting of diabetic heel ulcer- agreed to use of ace wrap for compression   OT to apply lymphedema wraps  F/u outpatient with lymphedema clinic

## 2023-09-18 NOTE — ASSESSMENT & PLAN NOTE
Lab Results   Component Value Date    HGBA1C 6.7 (A) 02/02/2023   follows with Dr. Nilton Lux local wound care  NWB RLE- orthotic approved   Inpatient MRI negative for OM  Completed 7 day course abx  F/u with Dr. Jesus Mahoney tomorrow

## 2023-09-18 NOTE — ASSESSMENT & PLAN NOTE
Wt Readings from Last 3 Encounters:   09/02/23 118 kg (259 lb 7.7 oz)   07/31/23 118 kg (260 lb)   07/21/23 125 kg (275 lb 9.2 oz)     Wt Readings from Last 3 Encounters:   09/02/23 118 kg (259 lb 7.7 oz)   07/31/23 118 kg (260 lb)   07/21/23 125 kg (275 lb 9.2 oz)     Stable   No overt signs of overload  Continue daily weights with prosthesis   Continue lasix   Monitor renal function

## 2023-09-18 NOTE — PROGRESS NOTES
[facility]  [address] [facility], [address] [facility] 6[address]  [facility]: POS 31    NAME: Francisco Coelho  AGE: 58 y.o. SEX: male  : 1960     DATE: 2023    CODE STATUS: CPR     Assessment and Plan:     Problem List Items Addressed This Visit        Endocrine    Type 2 diabetes mellitus with diabetic neuropathy, with long-term current use of insulin ([address])       Lab Results   Component Value Date    HGBA1C 6.7 (A) 2023   BS log reviewed  Continue lantus   Continue prn lispro 5 u QID BS >250  Continue accuchecks          Diabetic ulcer of right heel (HCC) - Primary       Lab Results   Component Value Date    HGBA1C 6.7 (A) 2023   follows with Dr. Ravi uDmas local wound care  NWB RLE- orthotic approved   Inpatient MRI negative for OM  Completed 7 day course abx  F/u with Dr. Raiza Flanagan tomorrow            Cardiovascular and Mediastinum    Chronic heart failure with preserved ejection fraction (HCC)     Wt Readings from Last 3 Encounters:   23 118 kg (259 lb 7.7 oz)   23 118 kg (260 lb)   23 125 kg (275 lb 9.2 oz)     Wt Readings from Last 3 Encounters:   23 118 kg (259 lb 7.7 oz)   23 118 kg (260 lb)   23 125 kg (275 lb 9.2 oz)     Stable   No overt signs of overload  Continue daily weights with prosthesis   Continue lasix   Monitor renal function            Other    Lymphedema of right lower extremity     Chronic  Discussed with Dr. Raiza Flanagan regarding use of ace wrap in the setting of diabetic heel ulcer- agreed to use of ace wrap for compression   OT to apply lymphedema wraps  F/u outpatient with lymphedema clinic               History of Present Illness:     Patient is a 58 yr old male being seen at [facility] for follow up of acute and chronic medical conditions. He was recently hospitalized on  d/t concern for osteomyelitis of right heel wound.   He had previous admission for RLE cellulitis and PAD at which time he was evaluated by vascular surgery and noted to have high risk of limb loss in the future. Inpatient  X-ray revealed suspected calcaneal fracture, large plantar ulcer over posterior calcaneous. MRI foot without evidence of osteomyelitis. BCID without any findings. He received 3 days IV of Flagyl and Ancef and transitioned to oral Keflex and Flagyl. He was admitted to [facility] for PT/OT services. On exam today he is doing well. No complaints of CP or SOB. Continues with significant RLE edema d/t lymphedema. He reports orthotic for right foot to prevent pressure on right heel was approved through physical therapy and is awaiting its arrival.       The following portions of the patient's history were reviewed and updated as appropriate: allergies, current medications, past family history, past medical history, past social history, past surgical history and problem list.     Review of Systems:     Review of Systems   Cardiovascular: Positive for leg swelling. Chronic; RLE   Skin: Positive for wound.         R heel        Problem List:     Patient Active Problem List   Diagnosis   • Anxiety and depression   • Type 2 diabetes mellitus with diabetic neuropathy, with long-term current use of insulin ([address])   • Hyperlipidemia   • 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   • Vitamin D deficiency   • PAD (peripheral artery disease) ([facility])   • S/P BKA (below knee amputation), left ([facility])   • Orthostatic hypotension   • Diabetic gastroparesis ([facility])   • Class 2 severe obesity due to excess calories with serious comorbidity and body mass index (BMI) of 35.0 to 35.9 in adult [facility])   • Triple vessel coronary artery disease   • Hypertensive heart disease with congestive heart failure ([facility])   • Chronic heart failure with preserved ejection fraction ([facility])   • S/P CABG x 3   • Diabetic autonomic neuropathy associated with type 2 diabetes mellitus ([address]) • Hyponatremia   • Obstructive sleep apnea   • Phantom limb syndrome without pain (HCC)   • CAD (coronary artery disease)   • Nodular rash   • Elephantiasis nostras verrucosa   • Embolism and thrombosis of arteries of the lower extremities (HCC)   • Lymphedema of right lower extremity   • Venous stasis dermatitis of right lower extremity   • Abdominal distension   • Non-pressure chronic ulcer of right calf with fat layer exposed ([address])   • Cellulitis of right ankle   • Lymphedema in adult patient   • Diabetic ulcer of right heel (HCC)   • Loose stools        Objective:     /64   Pulse 76   Temp 97.7 °F (36.5 °C)   SpO2 99%     Physical Exam  Vitals and nursing note reviewed. Constitutional:       General: He is not in acute distress. Appearance: He is well-developed. HENT:      Head: Normocephalic and atraumatic. Eyes:      Conjunctiva/sclera: Conjunctivae normal.   Cardiovascular:      Rate and Rhythm: Normal rate and regular rhythm. Heart sounds: No murmur heard. Pulmonary:      Effort: Pulmonary effort is normal. No respiratory distress. Breath sounds: Normal breath sounds. Abdominal:      Palpations: Abdomen is soft. Tenderness: There is no abdominal tenderness. Musculoskeletal:      Cervical back: Neck supple. Right lower leg: Edema present. Left Lower Extremity: Left leg is amputated below knee. Skin:     General: Skin is warm and dry. Capillary Refill: Capillary refill takes less than 2 seconds. Findings: Wound present. Comments: Skin scaling and thickening RLE   Neurological:      General: No focal deficit present. Mental Status: He is alert and oriented to person, place, and time. Mental status is at baseline.    Psychiatric:         Mood and Affect: Mood normal.         Behavior: Behavior normal.         Pertinent Laboratory/Diagnostic Studies:    Laboratory Results: I have personally reviewed the pertinent laboratory results/reports Radiology/Other Diagnostic Testing Results: I have personally reviewed pertinent reports.       Lynita Dance, [address] [facility]

## 2023-09-19 ENCOUNTER — OFFICE VISIT (OUTPATIENT)
Dept: WOUND CARE | Facility: CLINIC | Age: 63
End: 2023-09-19
Payer: COMMERCIAL

## 2023-09-19 VITALS
DIASTOLIC BLOOD PRESSURE: 82 MMHG | SYSTOLIC BLOOD PRESSURE: 134 MMHG | TEMPERATURE: 97.7 F | HEART RATE: 82 BPM | RESPIRATION RATE: 16 BRPM

## 2023-09-19 DIAGNOSIS — E11.621 DIABETIC ULCER OF RIGHT HEEL ASSOCIATED WITH TYPE 2 DIABETES MELLITUS, WITH FAT LAYER EXPOSED (HCC): Primary | ICD-10-CM

## 2023-09-19 DIAGNOSIS — L97.412 DIABETIC ULCER OF RIGHT HEEL ASSOCIATED WITH TYPE 2 DIABETES MELLITUS, WITH FAT LAYER EXPOSED (HCC): Primary | ICD-10-CM

## 2023-09-19 PROCEDURE — 11042 DBRDMT SUBQ TIS 1ST 20SQCM/<: CPT | Performed by: PODIATRIST

## 2023-09-19 NOTE — PATIENT INSTRUCTIONS
Orders Placed This Encounter   Procedures    Wound cleansing and dressings     RIGHT HEEL WOUND:  Do not get wound or dressing wet, sponge bath at this time  Cleanse wounds with mild soap & water. Pat dry. STOP DAKINS SOAK     Apply calcium alginate to wound bed  Cover with ABD  Secure with rolled gauze and paper tape  Change dressing every other day and as needed for excessive drainage    Tubular elastic bandage: Spandagrip Size H  Apply from base of toes to behind the knee. Apply in AM, may remove for sleep. Avoid prolonged standing in one place. Elevate leg(s) above the level of the heart when sitting or as much as possible. Keep pressure off right heel wound as much as humanly possible, DO NOT SIT WITH FOOT RESTING ON FLOOR     Toe touch transfers are ok. However, when you do transfer please wear Negative Heel shoe to offload. Make sure you are getting 3-4 servings protein per day in diet  Consider low sugar protein shake supplements at least daily     Follow up at the wound center in 2 weeks. Today's wound treatment note: Wounds were cleansed with soap & water, then redressed as ordered above.      Standing Status:   Future     Standing Expiration Date:   9/19/2024

## 2023-09-19 NOTE — PROGRESS NOTES
Patient ID: Dax Colunga is a 58 y.o. male Date of Birth 1960     Diagnosis:  1. Diabetic ulcer of right heel associated with type 2 diabetes mellitus, with fat layer exposed ([address])  -     Wound cleansing and dressings; Future  -     Debridement       Diagnosis ICD-10-CM Associated Orders   1. Diabetic ulcer of right heel associated with type 2 diabetes mellitus, with fat layer exposed ([address])  E11.621 Wound cleansing and dressings    L97.412 Debridement           Assessment & Plan:  1. Wound stable. See debridement below. IT has imrpoved significantly while in rehab. He is likely going home next week and I voiced my concerns that when he often goes home he has far less control over his day-to-day health care. He has historically had the inability to offload his heel and often sits in a dependent position. I am concerned with him going home that this wound will again deteriorate but unfortunately due to insurance issues I do not think we can keep him in rehab much longer    He is able to toe touch to transfer on paper but realistically I do not think he is physically capable of maintaining toe-touch. His facility ordered him a negative heel shoe this would not significantly offload his heel. It is better than nothing but again I am concerned with once he gets home because the then has stairs to get to the bathroom and an inability to elevate his foot other than on a stool. He is high risk for infection and limb loss. Chief Complaint   Patient presents with   • Follow Up Wound Care Visit     Follow up visit for wound to right foot. Pt denies any concerns of issues since last visit. Subjective:   Patient was hospitalized over concerns of osteomyelitis and pathologic fracture. The concern for fracture was actually radiographic artifact from his severe lymphedema skin folds. An MRI showed no fracture or osteomyelitis. There is early marrow edema but no erosion.   He was treated shortly with IV antibiotics and then discharged to a rehab facility. In the week and a half at the rehab facility his wound is looking significantly better. He is getting daily wound care and ability to offload his heel and elevate.       The following portions of the patient's history were reviewed and updated as appropriate:   Patient Active Problem List   Diagnosis   • Anxiety and depression   • Type 2 diabetes mellitus with diabetic neuropathy, with long-term current use of insulin ([address])   • Hyperlipidemia   • 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   • Vitamin D deficiency   • PAD (peripheral artery disease) ([facility])   • S/P BKA (below knee amputation), left ([facility])   • Orthostatic hypotension   • Diabetic gastroparesis ([facility])   • Class 2 severe obesity due to excess calories with serious comorbidity and body mass index (BMI) of 35.0 to 35.9 in adult [facility])   • Triple vessel coronary artery disease   • Hypertensive heart disease with congestive heart failure ([facility])   • Chronic heart failure with preserved ejection fraction ([facility])   • S/P CABG x 3   • Diabetic autonomic neuropathy associated with type 2 diabetes mellitus ([facility])   • Hyponatremia   • Obstructive sleep apnea   • Phantom limb syndrome without pain ([facility])   • CAD (coronary artery disease)   • Nodular rash   • Elephantiasis nostras [place]ru[place]a   • Embolism and thrombosis of arteries of the lower extremities ([facility])   • Lymphedema of right lower extremity   • Venous stasis dermatitis of right lower extremity   • Abdominal distension   • Non-pressure chronic ulcer of right calf with fat layer exposed ([address])   • Cellulitis of right ankle   • Lymphedema in adult patient   • Diabetic ulcer of right heel ([facility])   • Loose stools     Past Medical History:   Diagnosis Date   • Amputated below knee, left ([address]) 10/4/2018   • Anxiety    • Anxiety and depression    • Cataract    • Congestive cardiac failure ([address])    • Coronary artery disease    • Depression    • Diabetes mellitus ([address]    • Diabetic autonomic neuropathy associated with type 2 diabetes mellitus ([address])     Last Assessed: 2017   • History of DVT (deep vein thrombosis)     left LE   • Hyperlipidemia    • Lymphedema of right lower extremity    • Obesity    • Orthostatic hypotension      Past Surgical History:   Procedure Laterality Date   • CORONARY ARTERY BYPASS GRAFT     • EYE SURGERY      cataracts bilateral   • LEG AMPUTATION THROUGH LOWER TIBIA AND FIBULA Left 8/3/2018    Procedure: AMPUTATION BELOW KNEE (BKA) L BKA;  Surgeon: Archana Valerio MD;  Location: [facility];  Service: Vascular   • OR CORONARY ARTERY BYP W/VEIN & ARTERY GRAFT 4 VEIN N/A 3/28/2018    Procedure: CORONARY ARTERY BYPASS GRAFT (CABG) x3 VESSELS, LIMA TO LAD, SVG--> PDA, SVG--> OM2,  RIGHT LEG EVH/SVH TO , INTRA-OP AMANDEEP;  Surgeon: Rommel Goldstein MD;  Location: [facility];  Service: Cardiac Surgery   • ROTATOR CUFF REPAIR Bilateral      Social History     Socioeconomic History   • Marital status: /Civil Union     Spouse name: None   • Number of children: None   • Years of education: None   • Highest education level: None   Occupational History   • None   Tobacco Use   • Smoking status: Former     Packs/day: 1.00     Years: 12.00     Total pack years: 12.00     Types: Cigarettes     Quit date: 3/23/1994     Years since quittin.5   • Smokeless tobacco: Never   Vaping Use   • Vaping Use: Never used   Substance and Sexual Activity   • Alcohol use: No     Comment: rarely   • Drug use: No   • Sexual activity: Not Currently   Other Topics Concern   • None   Social History Narrative   • None     Social Determinants of Health     Financial Resource Strain: Not on file   Food Insecurity: No Food Insecurity (2023)    Hunger Vital Sign    • Worried About Running Out of Food in the Last Year: Never true    • Ran Out of Food in the Last Year: Never true   Transportation Needs: No Transportation Needs (2023) PRAPARE - Transportation    • Lack of Transportation (Medical): No    • Lack of Transportation (Non-Medical): No   Physical Activity: Not on file   Stress: Not on file   Social Connections: Moderately Isolated (10/29/2020)    Social Connection and Isolation Panel [NHANES]    • Frequency of Communication with Friends and Family: Three times a week    • Frequency of Social Gatherings with Friends and Family:  Three times a week    • Attends Baptism Services: Never    • Active Member of Clubs or Organizations: No    • Attends Club or Organization Meetings: Never    • Marital Status:    Intimate Partner Violence: Not At Risk (10/29/2020)    Humiliation, Afraid, Rape, and Kick questionnaire    • Fear of Current or Ex-Partner: No    • Emotionally Abused: No    • Physically Abused: No    • Sexually Abused: No   Housing Stability: Low Risk  (8/30/2023)    Housing Stability Vital Sign    • Unable to Pay for Housing in the Last Year: No    • Number of Places Lived in the Last Year: 1    • Unstable Housing in the Last Year: No        Current Outpatient Medications:   •  ammonium lactate (LAC-HYDRIN) 12 % lotion, Apply 1 Application topically in the morning, Disp: 222 mL, Rfl: 0  •  aspirin (ECOTRIN LOW STRENGTH) 81 mg EC tablet, Take 81 mg by mouth daily, Disp: , Rfl:   •  atorvastatin (LIPITOR) 80 mg tablet, Take 1 tablet (80 mg total) by mouth daily, Disp: 90 tablet, Rfl: 1  •  cholecalciferol (VITAMIN D3) 1,000 units tablet, Take 1,000 Units by mouth daily, Disp: , Rfl:   •  clopidogrel (PLAVIX) 75 mg tablet, TAKE 1 TABLET BY MOUTH DAILY, Disp: 90 tablet, Rfl: 0  •  finasteride (PROSCAR) 5 mg tablet, TAKE 1 TABLET BY MOUTH DAILY, Disp: 100 tablet, Rfl: 2  •  furosemide (LASIX) 40 mg tablet, TAKE 1 TABLET BY MOUTH TWICE  DAILY, Disp: 200 tablet, Rfl: 2  •  glucose blood test strip, ReliOn Prime Meter, Disp: , Rfl:   •  Insulin Glargine Solostar (Lantus SoloStar) 100 UNIT/ML SOPN, Inject 0.4 mL (40 Units total) under the skin 2 (two) times a day This is dosing for rehab on discharge, Disp: , Rfl: 0  •  metoclopramide (REGLAN) 5 mg tablet, Take 1 tablet (5 mg total) by mouth daily, Disp: 90 tablet, Rfl: 1  •  midodrine (PROAMATINE) 2.5 mg tablet, Take 2.5 mg by mouth 3 (three) times a day before meals Must remain upright for at least 1 hour after dose, Disp: , Rfl:   •  pantoprazole (PROTONIX) 40 mg tablet, TAKE 1 TABLET BY MOUTH DAILY IN  THE EARLY MORNING, Disp: 90 tablet, Rfl: 0  •  tamsulosin (FLOMAX) 0.4 mg, Take 1 capsule (0.4 mg total) by mouth daily with dinner, Disp: 90 capsule, Rfl: 1  •  saccharomyces boulardii (FLORASTOR) 250 mg capsule, Take 1 capsule (250 mg total) by mouth 2 (two) times a day for 14 days (Patient not taking: Reported on 9/19/2023), Disp: 28 capsule, Rfl: 0  •  sodium hypochlorite (DAKIN'S QUARTER-STRENGTH), Irrigate with 1 Application as directed daily (Patient not taking: Reported on 9/19/2023), Disp: 473 mL, Rfl: 1  Family History   Problem Relation Age of Onset   • Atrial fibrillation Mother    • Stroke Mother    • Hypertension Father    • Heart attack Paternal Grandfather 61   • Diabetes Maternal Grandmother    • Diabetes Maternal Grandfather    • Diabetes Maternal Aunt    • Diabetes Maternal Uncle       Review of Systems  Allergies:  Patient has no known allergies. Objective:  /82   Pulse 82   Temp 97.7 °F (36.5 °C) (Tympanic)   Resp 16     Physical Exam        Wound 07/21/23 Diabetic Ulcer Heel Right (Active)   Enter [place] score: [place] Grade 1: Partial or full-thickness ulcer (superficial) 08/22/23 0851   Wound Image   09/19/23 1134   Wound Description Slough; Necrotic;Pink 08/29/23 0937   Anya-wound Assessment Dry; Intact 08/29/23 0937   Wound Length (cm) 4 cm 09/19/23 1119   Wound Width (cm) 3.7 cm 09/19/23 1119   Wound Depth (cm) 0.5 cm 09/19/23 1119   Wound Surface Area (cm^2) 14.8 cm^2 09/19/23 1119   Wound Volume (cm^3) 7.4 cm^3 09/19/23 1119   Calculated Wound Volume (cm^3) 7.4 cm^3 09/19/23 1119   Change in Wound Size % -501.63 09/19/23 1119   Undermining 0.6 09/19/23 1119   Undermining is depth extending from 8 o'clock to 2 o'clock ( toes at 12 o'clock) 09/19/23 1119   Drainage Amount Moderate 09/19/23 1119   Drainage Description Serous; Yellow 09/19/23 1119   Non-staged Wound Description Full thickness 09/19/23 1119   Treatments Irrigation with NSS 09/19/23 1119   Dressing Changed Changed 08/07/23 1419   Patient Tolerance Tolerated well 09/19/23 1119   Dressing Status Removed 09/19/23 1119                         Debridement   Wound 07/21/23 Diabetic Ulcer Heel Right    Universal Protocol:  Procedure performed by: (Dr. Cipriano Riley An PGY-4)  Consent: Verbal consent obtained. Risks and benefits: risks, benefits and alternatives were discussed  Consent given by: patient  Time out: Immediately prior to procedure a "time out" was called to verify the correct patient, procedure, equipment, support staff and site/side marked as required.   Timeout called at: 9/19/2023 11:42 AM.  Patient understanding: patient states understanding of the procedure being performed  Patient identity confirmed: verbally with patient      Performed by: physician  Debridement type: surgical  Level of debridement: subcutaneous tissue  Pain control: lidocaine 4%  Post-debridement measurements  Length (cm): 4.4  Width (cm): 4  Depth (cm): 0.8  Percent debrided: 100%  Surface Area (cm^2): 17.6  Area debrided (cm^2): 17.6  Volume (cm^3): 14.08  Tissue and other material debrided: adipose, dermis, epidermis and subcutaneous tissue  Devitalized tissue debrided: biofilm, callus, clots, exudate, fibrin, necrotic debris, slough and eschar  Instrument(s) utilized: blade  Bleeding: medium  Hemostasis obtained with: silver nitrate  Procedural pain (0-10): insensate  Post-procedural pain: insensate   Response to treatment: procedure was tolerated well                   Results from last 6 Months   Lab Units 08/29/23  1139 WOUND CULTURE  3+ Growth of Escherichia coli*  3+ Growth of Proteus mirabilis*  3+ Growth of           Wound Instructions:  Orders Placed This Encounter   Procedures   • Wound cleansing and dressings     RIGHT HEEL WOUND:  Do not get wound or dressing wet, sponge bath at this time  Cleanse wounds with mild soap & water. Pat dry. STOP DAKINS SOAK     Apply calcium alginate to wound bed  Cover with ABD  Secure with rolled gauze and paper tape  Change dressing every other day and as needed for excessive drainage    Tubular elastic bandage: Spandagrip Size H  Apply from base of toes to behind the knee. Apply in AM, may remove for sleep. Avoid prolonged standing in one place. Elevate leg(s) above the level of the heart when sitting or as much as possible. Keep pressure off right heel wound as much as humanly possible, DO NOT SIT WITH FOOT RESTING ON FLOOR     Toe touch transfers are ok. However, when you do transfer please wear Negative Heel shoe to offload.      Make sure you are getting 3-4 servings protein per day in diet  Consider low sugar protein shake supplements at least daily     Follow up at the wound center in 2 weeks.      Today's wound treatment note: Wounds were cleansed with soap & water, then redressed as ordered above. Standing Status:   Future     Standing Expiration Date:   9/19/2024   • Debridement     This order was created via procedure documentation         Karson Nick DPM      Portions of the record may have been created with voice recognition software. Occasional wrong word or "sound a like" substitutions may have occurred due to the inherent limitations of voice recognition software. Read the chart carefully and recognize, using context, where substitutions have occurred.

## 2023-09-21 ENCOUNTER — NURSING HOME VISIT (OUTPATIENT)
Dept: GERIATRICS | Facility: OTHER | Age: 63
End: 2023-09-21
Payer: COMMERCIAL

## 2023-09-21 VITALS
SYSTOLIC BLOOD PRESSURE: 105 MMHG | DIASTOLIC BLOOD PRESSURE: 53 MMHG | TEMPERATURE: 97.9 F | OXYGEN SATURATION: 98 % | HEART RATE: 88 BPM

## 2023-09-21 DIAGNOSIS — L97.412 DIABETIC ULCER OF RIGHT HEEL ASSOCIATED WITH TYPE 2 DIABETES MELLITUS, WITH FAT LAYER EXPOSED (HCC): ICD-10-CM

## 2023-09-21 DIAGNOSIS — I50.32 CHRONIC HEART FAILURE WITH PRESERVED EJECTION FRACTION (HCC): ICD-10-CM

## 2023-09-21 DIAGNOSIS — E11.43 DIABETIC GASTROPARESIS: Primary | Chronic | ICD-10-CM

## 2023-09-21 DIAGNOSIS — I89.0 LYMPHEDEMA OF RIGHT LOWER EXTREMITY: ICD-10-CM

## 2023-09-21 DIAGNOSIS — K31.84 DIABETIC GASTROPARESIS: Primary | Chronic | ICD-10-CM

## 2023-09-21 DIAGNOSIS — E11.621 DIABETIC ULCER OF RIGHT HEEL ASSOCIATED WITH TYPE 2 DIABETES MELLITUS, WITH FAT LAYER EXPOSED (HCC): ICD-10-CM

## 2023-09-21 PROCEDURE — 99309 SBSQ NF CARE MODERATE MDM 30: CPT

## 2023-09-21 NOTE — PROGRESS NOTES
[facility]  [address] [facility], [address] [facility] [address]  [facility]: POS 31    NAME: Francisco Coelho  AGE: 58 y.o. SEX: male  : 1960     DATE: 2023    CODE STATUS: CPR     Assessment and Plan:     Problem List Items Addressed This Visit        Digestive    Diabetic gastroparesis ([address]) - Primary (Chronic)     C/o upset stomach  Reports he takes pepto bismol at home   Will order pepto bismol prn             Endocrine    Diabetic ulcer of right heel ([address])       Lab Results   Component Value Date    HGBA1C 6.7 (A) 2023   Improving  follows with Dr. Ravi Dumas local wound care  Toe touch weight bearing   Heel offload orthotic  F/u with podiatry as scheduled            Cardiovascular and Mediastinum    Chronic heart failure with preserved ejection fraction (HCC)     Wt Readings from Last 3 Encounters:   23 118 kg (259 lb 7.7 oz)   23 118 kg (260 lb)   23 125 kg (275 lb 9.2 oz)     Wt Readings from Last 3 Encounters:   23 118 kg (259 lb 7.7 oz)   23 118 kg (260 lb)   23 125 kg (275 lb 9.2 oz)     Stable   No overt signs of overload  Continue daily weights with prosthesis   Continue lasix   Monitor renal function            Other    Lymphedema of right lower extremity     Chronic   Continue compression wraps                 History of Present Illness:     Patient is a 58 yr old male being seen at [facility] for follow up of acute and chronic medical conditions. He was recently hospitalized on  d/t concern for osteomyelitis of right heel wound. He had previous admission for RLE cellulitis and PAD at which time he was evaluated by vascular surgery and noted to have high risk of limb loss in the future. Inpatient  X-ray revealed suspected calcaneal fracture, large plantar ulcer over posterior calcaneous. MRI foot without evidence of osteomyelitis. BCID without any findings.  He received 3 days IV of Flagyl and Ancef and transitioned to oral Keflex and Flagyl. He was admitted to [facility] for PT/OT services. On exam today he is doing ok. No complaints of CP or SOB. Continues with significant RLE edema d/t lymphedema. He is fatigued today and complaining of upset stomach. He reports at home he takes pepto bismol when his stomach is upset and would like to take pepto bismol at [facility]. Plan is for patient to return home next week with wife. The following portions of the patient's history were reviewed and updated as appropriate: allergies, current medications, past family history, past medical history, past social history, past surgical history and problem list.     Review of Systems:     Review of Systems   Constitutional: Positive for fatigue. Cardiovascular: Positive for leg swelling. Chronic; RLE   Gastrointestinal:        "upset stomach"   Skin: Positive for wound.         R heel        Problem List:     Patient Active Problem List   Diagnosis   • Anxiety and depression   • Type 2 diabetes mellitus with diabetic neuropathy, with long-term current use of insulin ([address])   • Hyperlipidemia   • 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   • Vitamin D deficiency   • PAD (peripheral artery disease) ([facility])   • S/P BKA (below knee amputation), left ([facility])   • Orthostatic hypotension   • Diabetic gastroparesis ([facility])   • Class 2 severe obesity due to excess calories with serious comorbidity and body mass index (BMI) of 35.0 to 35.9 in adult [facility])   • Triple vessel coronary artery disease   • Hypertensive heart disease with congestive heart failure ([facility])   • Chronic heart failure with preserved ejection fraction ([facility])   • S/P CABG x 3   • Diabetic autonomic neuropathy associated with type 2 diabetes mellitus ([facility])   • Hyponatremia   • Obstructive sleep apnea   • Phantom limb syndrome without pain ([facility])   • CAD (coronary artery disease)   • Nodular rash   • Elephantiasis nostras verrucosa   • Embolism and thrombosis of arteries of the lower extremities (HCC)   • Lymphedema of right lower extremity   • Venous stasis dermatitis of right lower extremity   • Abdominal distension   • Non-pressure chronic ulcer of right calf with fat layer exposed ([address])   • Cellulitis of right ankle   • Lymphedema in adult patient   • Diabetic ulcer of right heel (HCC)   • Loose stools        Objective:     /53   Pulse 88   Temp 97.9 °F (36.6 °C)   SpO2 98%     Physical Exam  Vitals and nursing note reviewed. Constitutional:       General: He is not in acute distress. Appearance: He is well-developed. HENT:      Head: Normocephalic and atraumatic. Eyes:      Conjunctiva/sclera: Conjunctivae normal.   Cardiovascular:      Rate and Rhythm: Normal rate and regular rhythm. Heart sounds: No murmur heard. Pulmonary:      Effort: Pulmonary effort is normal. No respiratory distress. Breath sounds: Normal breath sounds. Abdominal:      Palpations: Abdomen is soft. Tenderness: There is no abdominal tenderness. Musculoskeletal:      Cervical back: Neck supple. Right lower leg: Edema present. Left Lower Extremity: Left leg is amputated below knee. Skin:     General: Skin is warm and dry. Capillary Refill: Capillary refill takes less than 2 seconds. Findings: Wound present. Comments: Skin scaling and thickening RLE   Neurological:      General: No focal deficit present. Mental Status: He is alert and oriented to person, place, and time. Mental status is at baseline. Psychiatric:         Mood and Affect: Mood normal.         Behavior: Behavior normal.         Pertinent Laboratory/Diagnostic Studies:    Laboratory Results: I have personally reviewed the pertinent laboratory results/reports     Radiology/Other Diagnostic Testing Results: I have personally reviewed pertinent reports.       Dragan Nazario, [facility]

## 2023-09-21 NOTE — ASSESSMENT & PLAN NOTE
Lab Results   Component Value Date    HGBA1C 6.7 (A) 02/02/2023   Improving  follows with Dr. Chauncey Salmon local wound care  Toe touch weight bearing   Heel offload orthotic  F/u with podiatry as scheduled

## 2023-09-25 ENCOUNTER — NURSING HOME VISIT (OUTPATIENT)
Dept: GERIATRICS | Facility: OTHER | Age: 63
End: 2023-09-25
Payer: COMMERCIAL

## 2023-09-25 VITALS
SYSTOLIC BLOOD PRESSURE: 117 MMHG | DIASTOLIC BLOOD PRESSURE: 60 MMHG | TEMPERATURE: 98.1 F | OXYGEN SATURATION: 95 % | HEART RATE: 73 BPM

## 2023-09-25 DIAGNOSIS — I89.0 LYMPHEDEMA OF RIGHT LOWER EXTREMITY: ICD-10-CM

## 2023-09-25 DIAGNOSIS — I50.32 CHRONIC HEART FAILURE WITH PRESERVED EJECTION FRACTION (HCC): ICD-10-CM

## 2023-09-25 DIAGNOSIS — L97.412 DIABETIC ULCER OF RIGHT HEEL ASSOCIATED WITH TYPE 2 DIABETES MELLITUS, WITH FAT LAYER EXPOSED (HCC): Primary | ICD-10-CM

## 2023-09-25 DIAGNOSIS — E11.621 DIABETIC ULCER OF RIGHT HEEL ASSOCIATED WITH TYPE 2 DIABETES MELLITUS, WITH FAT LAYER EXPOSED (HCC): Primary | ICD-10-CM

## 2023-09-25 PROCEDURE — 99309 SBSQ NF CARE MODERATE MDM 30: CPT

## 2023-09-25 NOTE — ASSESSMENT & PLAN NOTE
Lab Results   Component Value Date    HGBA1C 6.7 (A) 02/02/2023     Improving   Follows with podiatry   Continue local wound care at [facility]  Will follow with wound center on discharge as well as podiatry   Toe touch weight bearing   Continue heel offload orthotic   PT/OT  Continue lymphedema management to facilitate wound heeling

## 2023-09-25 NOTE — ASSESSMENT & PLAN NOTE
Wt Readings from Last 3 Encounters:   09/02/23 118 kg (259 lb 7.7 oz)   07/31/23 118 kg (260 lb)   07/21/23 125 kg (275 lb 9.2 oz)     Weights stable  No overt signs of overload  Continue daily weights  Continue lasix  Monitor renal function

## 2023-09-25 NOTE — PROGRESS NOTES
[facility]  [address] [facility], [address] [facility] [address]  [facility]: POS 31    NAME: Pavithra Bowman  AGE: 58 y.o. SEX: male  : 1960     DATE: 2023    CODE STATUS: CPR     Assessment and Plan:     Problem List Items Addressed This Visit        Endocrine    Diabetic ulcer of right heel ([address]) - Primary       Lab Results   Component Value Date    HGBA1C 6.7 (A) 2023     Improving   Follows with podiatry   Continue local wound care at [facility]  Will follow with wound center on discharge as well as podiatry   Toe touch weight bearing   Continue heel offload orthotic   PT/OT  Continue lymphedema management to facilitate wound heeling            Cardiovascular and Mediastinum    Chronic heart failure with preserved ejection fraction (HCC)     Wt Readings from Last 3 Encounters:   23 118 kg (259 lb 7.7 oz)   23 118 kg (260 lb)   23 125 kg (275 lb 9.2 oz)     Weights stable  No overt signs of overload  Continue daily weights  Continue lasix  Monitor renal function            Other    Lymphedema of right lower extremity     Reports improvement with use of tubi   Continue tubi  at [facility]                 History of Present Illness:     Patient is a 58 yr old male being seen at [facility] for follow up of acute and chronic medical conditions. He was recently hospitalized on  d/t concern for osteomyelitis of right heel wound. He had previous admission for RLE cellulitis and PAD at which time he was evaluated by vascular surgery and noted to have high risk of limb loss in the future. Inpatient  X-ray revealed suspected calcaneal fracture, large plantar ulcer over posterior calcaneous. MRI foot without evidence of osteomyelitis. BCID without any findings. He received 3 days IV of Flagyl and Ancef and transitioned to oral Keflex and Flagyl. He was admitted to [facility] for PT/OT services. On exam today he is doing well.  No complaints of CP or SOB. Continues with significant RLE edema d/t lymphedema. He reports physical therapy is going well. He is able to transfer from recliner to wheelchair. He is hopeful to go home this weekend. The following portions of the patient's history were reviewed and updated as appropriate: allergies, current medications, past family history, past medical history, past social history, past surgical history and problem list.     Review of Systems:     Review of Systems   Cardiovascular: Positive for leg swelling. Chronic; RLE   Skin: Positive for wound.         R heel        Problem List:     Patient Active Problem List   Diagnosis   • Anxiety and depression   • Type 2 diabetes mellitus with diabetic neuropathy, with long-term current use of insulin ([address])   • Hyperlipidemia   • 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   • Vitamin D deficiency   • PAD (peripheral artery disease) ([facility])   • S/P BKA (below knee amputation), left ([facility])   • Orthostatic hypotension   • Diabetic gastroparesis ([facility])   • Class 2 severe obesity due to excess calories with serious comorbidity and body mass index (BMI) of 35.0 to 35.9 in adult [facility])   • Triple vessel coronary artery disease   • Hypertensive heart disease with congestive heart failure ([facility])   • Chronic heart failure with preserved ejection fraction ([facility])   • S/P CABG x 3   • Diabetic autonomic neuropathy associated with type 2 diabetes mellitus ([facility])   • Hyponatremia   • Obstructive sleep apnea   • Phantom limb syndrome without pain ([facility])   • CAD (coronary artery disease)   • Nodular rash   • Elephantiasis nostras verrucosa   • Embolism and thrombosis of arteries of the lower extremities ([facility])   • Lymphedema of right lower extremity   • Venous stasis dermatitis of right lower extremity   • Abdominal distension   • Non-pressure chronic ulcer of right calf with fat layer exposed ([address])   • Cellulitis of right ankle   • Lymphedema in adult patient   • Diabetic ulcer of right heel (HCC)   • Loose stools        Objective:     /60   Pulse 73   Temp 98.1 °F (36.7 °C)   SpO2 95%     Physical Exam  Vitals and nursing note reviewed. Constitutional:       General: He is not in acute distress. Appearance: He is well-developed. HENT:      Head: Normocephalic and atraumatic. Eyes:      Conjunctiva/sclera: Conjunctivae normal.   Cardiovascular:      Rate and Rhythm: Normal rate and regular rhythm. Heart sounds: No murmur heard. Pulmonary:      Effort: Pulmonary effort is normal. No respiratory distress. Breath sounds: Normal breath sounds. Abdominal:      General: There is distension. Palpations: Abdomen is soft. Tenderness: There is no abdominal tenderness. Musculoskeletal:      Cervical back: Neck supple. Right lower leg: Edema present. Left Lower Extremity: Left leg is amputated below knee. Skin:     General: Skin is warm and dry. Capillary Refill: Capillary refill takes less than 2 seconds. Findings: Wound present. Comments: Skin scaling and thickening RLE   Neurological:      General: No focal deficit present. Mental Status: He is alert and oriented to person, place, and time. Mental status is at baseline. Psychiatric:         Mood and Affect: Mood normal.         Behavior: Behavior normal.         Pertinent Laboratory/Diagnostic Studies:    Laboratory Results: I have personally reviewed the pertinent laboratory results/reports     Radiology/Other Diagnostic Testing Results: I have personally reviewed pertinent reports.       Rhea Conner, [facility]

## 2023-09-27 ENCOUNTER — NURSING HOME VISIT (OUTPATIENT)
Dept: GERIATRICS | Facility: OTHER | Age: 63
End: 2023-09-27
Payer: COMMERCIAL

## 2023-09-27 ENCOUNTER — HOME HEALTH ADMISSION (OUTPATIENT)
Dept: HOME HEALTH SERVICES | Facility: HOME HEALTHCARE | Age: 63
End: 2023-09-27
Payer: COMMERCIAL

## 2023-09-27 DIAGNOSIS — E11.621 DIABETIC ULCER OF RIGHT HEEL ASSOCIATED WITH TYPE 2 DIABETES MELLITUS, WITH FAT LAYER EXPOSED (HCC): Primary | ICD-10-CM

## 2023-09-27 DIAGNOSIS — K31.84 DIABETIC GASTROPARESIS: Chronic | ICD-10-CM

## 2023-09-27 DIAGNOSIS — E11.40 TYPE 2 DIABETES MELLITUS WITH DIABETIC NEUROPATHY, WITH LONG-TERM CURRENT USE OF INSULIN (HCC): ICD-10-CM

## 2023-09-27 DIAGNOSIS — L97.412 DIABETIC ULCER OF RIGHT HEEL ASSOCIATED WITH TYPE 2 DIABETES MELLITUS, WITH FAT LAYER EXPOSED (HCC): Primary | ICD-10-CM

## 2023-09-27 DIAGNOSIS — Z79.4 TYPE 2 DIABETES MELLITUS WITH DIABETIC NEUROPATHY, WITH LONG-TERM CURRENT USE OF INSULIN (HCC): ICD-10-CM

## 2023-09-27 DIAGNOSIS — E11.43 DIABETIC GASTROPARESIS: Chronic | ICD-10-CM

## 2023-09-27 DIAGNOSIS — I73.9 PAD (PERIPHERAL ARTERY DISEASE) (HCC): ICD-10-CM

## 2023-09-27 PROCEDURE — 99309 SBSQ NF CARE MODERATE MDM 30: CPT | Performed by: INTERNAL MEDICINE

## 2023-09-27 NOTE — ASSESSMENT & PLAN NOTE
stable  Lab Results   Component Value Date    HGBA1C 6.7 (A) 02/02/2023   cont metoclopramide 5 mg 1 tab po daily  Cont over the counter pepto-bismol q 6 hours prn upset stomach

## 2023-09-27 NOTE — PROGRESS NOTES
[address]  [phone]) [address] [facility]  POS [address]      NAME: Jairo Cooley  AGE: 58 y.o. SEX: male [phone]    DATE OF ENCOUNTER: 9/27/2023    Assessment and Plan     1. Diabetic ulcer of right heel associated with type 2 diabetes mellitus, with fat layer exposed ([address])        2. Diabetic gastroparesis ([address])        3. Type 2 diabetes mellitus with diabetic neuropathy, with long-term current use of insulin ([address])        4. PAD (peripheral artery disease) ([address])           Diabetic ulcer of right heel ([address])    Lab Results   Component Value Date    HGBA1C 6.7 (A) 02/02/2023   pt regularly sees podiatry  Toe touch weight bearing  Cont heel offload orthotic  Cont lymphedema management to facilitate wound healing  PT/OT to eval/tx    Diabetic gastroparesis ([address])  stable  Lab Results   Component Value Date    HGBA1C 6.7 (A) 02/02/2023   cont metoclopramide 5 mg 1 tab po daily  Cont over the counter pepto-bismol q 6 hours prn upset stomach    Type 2 diabetes mellitus with diabetic neuropathy, with long-term current use of insulin ([facility])    Lab Results   Component Value Date    HGBA1C 6.7 (A) 02/02/2023     169.0 mg/dL 9/27/2023 07:43     9/26/2023 19:32 203.0 mg/dL     9/26/2023 07:35 196.0 mg/dL     9/25/2023 19:40 268.0 mg/dL     9/25/2023 19:38 268.0 mg/dL     9/25/2023 07:32 195.0 mg/dL     9/24/2023 19:52 239.0 mg/dL     9/24/2023 08:00 158.0 mg/dL     Sugars stable  Cont lantus 40 units SQ BID  Cont lispro 5 units q.i.d. for sugars >250      PAD (peripheral artery disease) ([address])  4/23 LEADS "Rt 50-70% proximal popliteal stenosis  Pt with hx of LT bka in 2018  Pt with hx of noncompliance with meds  Cont asa/plaix/statin       Code status: CPR    Chief Complaint     STR follow-up visit. History of Present Illness   Pt seen and examined as being discharged in 2 days and as part of STR visit. Did face to face & wrote out scripts.  Pt denies any fever, chills, nausea vomiting. The following portions of the patient's history were reviewed and updated as appropriate: allergies, current medications, past family history, past medical history, past social history, past surgical history and problem list.    Review of Systems     Review of Systems   Constitutional: Negative for chills and fever. Musculoskeletal: Negative for arthralgias. All other systems reviewed and are negative. Active Problem List     Patient Active Problem List   Diagnosis   • Anxiety and depression   • Type 2 diabetes mellitus with diabetic neuropathy, with long-term current use of insulin ([address])   • Hyperlipidemia   • 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   • Vitamin D deficiency   • PAD (peripheral artery disease) ([facility])   • S/P BKA (below knee amputation), left ([facility])   • Orthostatic hypotension   • Diabetic gastroparesis ([facility])   • Class 2 severe obesity due to excess calories with serious comorbidity and body mass index (BMI) of 35.0 to 35.9 in adult [facility])   • Triple vessel coronary artery disease   • Hypertensive heart disease with congestive heart failure ([facility])   • Chronic heart failure with preserved ejection fraction ([facility])   • S/P CABG x 3   • Diabetic autonomic neuropathy associated with type 2 diabetes mellitus ([facility])   • Hyponatremia   • Obstructive sleep apnea   • Phantom limb syndrome without pain ([facility])   • CAD (coronary artery disease)   • Nodular rash   • Elephantiasis nostras verrucosa   • Embolism and thrombosis of arteries of the lower extremities ([facility])   • Lymphedema of right lower extremity   • Venous stasis dermatitis of right lower extremity   • Abdominal distension   • Non-pressure chronic ulcer of right calf with fat layer exposed ([address])   • Cellulitis of right ankle   • Lymphedema in adult patient   • Diabetic ulcer of right heel ([facility])   • Loose stools       Objective     Vitals: Reviewed in [employer] system.  VSS    General: Awake, alert & oriented x 3  Head: atraumatic, normocephalic  Cardiovascular: RRR  Lungs: Clear to auscultation bilaterally  Abdomen: nontender/nondistended, +BS  Legs: no cyanosis, clubbing; pt with lymphedema of Rt leg; Rt leg wrapped in tubigrip; left leg with prosthesis  Skin: clean, dry  Psych: calm, cooperative    Pertinent Laboratory/Diagnostic Studies:  Refer to facility chart. Current Medications   Medications reviewed and updated in facility chart.     Cholecalciferol Tablet 1000 UNIT    Furosemide Oral Tablet 40 MG (Furosemide) Give 1 tablet by mouth two times a day for CHF related to CHRONIC DIASTOLIC (CONGESTIVE) HEART FAILURE (I50.32)   Ammonium Lactate External Cream 12 % (Lactic Acid (Ammonium Lactate)) Apply to RLE topically every day shift for scaly skin related to XEROSIS CUTIS (L85.3)   Clopidogrel Bisulfate Oral Tablet 75 MG (Clopidogrel Bisulfate) Give 1 tablet by mouth one time a day related to ATHEROSCLEROTIC HEART DISEASE OF NATIVE CORONARY ARTERY WITHOUT ANGINA PECTORIS (I25.10)   Pantoprazole Sodium Tablet Delayed Release 40 MG Give 1 tablet by mouth one time a day for gastroparesis related to GASTROPARESIS (K31.84) *Do Not Crush*   Tamsulosin HCl Oral Capsule 0.4 MG (Tamsulosin HCl) Give 1 capsule by mouth in the evening for urinary retention related to RETENTION OF URINE, UNSPECIFIED (R33.9)   Atorvastatin Calcium Oral Tablet 80 MG (Atorvastatin Calcium) Give 1 tablet by mouth in the evening related to HYPERLIPIDEMIA, UNSPECIFIED (E78.5)   Metoclopramide HCl Oral Tablet 5 MG (Metoclopramide HCl) Give 1 tablet by mouth one time a day for Gastroparesis related to GASTROPARESIS (K31.84)   Insulin Glargine Solution 100 UNIT/ML Inject 40 unit subcutaneously two times a day for DM2 related to TYPE 2 DIABETES MELLITUS WITH DIABETIC NEUROPATHY, UNSPECIFIED (E11.40)   Acetaminophen Tablet 325 MG Give 2 tablet by mouth every 4 hours as needed for Mild Pain Max 3 GM APAP / 24 HR, Give Suppository If Unable to Take By Mouth AND Give 2 tablet by mouth every 4 hours as needed for Temperature = or > 100 degrees Oral / 101 degrees Rectal Max 3 GM APAP / 24 HR, Give Suppository If Unable to Take By Mouth   Acetaminophen Suppository 650 MG Insert 1 suppository rectally every 4 hours as needed for Mild Pain Max 3 GM APAP / 24 HR, Give Suppository If Unable to Take By Mouth AND Insert 1 suppository rectally every 4 hours as needed for Temperature = or > 100 degrees Oral / 101 degrees Rectal Max 3 GM APAP / 24 HR, Give Suppository If Unable to Take By Mouth   Milk of Magnesia Suspension 2400 MG/30ML Give 30 ml by mouth as needed for Constipation 1 Time a Day, If No BM on by Day # 2, Evening Nurse Gives a Laxative on Day # 2 AND Give 30 ml by mouth as needed for Constipation 1 Time a Day, If No BM on by Day # 3, Evening Nurse Gives a Laxative on Day # 3   Bisac-Evac Suppository 10 MG (Bisacodyl) Insert 1 suppository rectally as needed for Constipation 1 Time a Day, If NO BM by AM Day # 4, Day Nurse Gives a Suppository that AM   Enema Disposable Enema (Sodium Phosphates) Insert 1 application rectally as needed for Constipation 1 Time a Day, If NO BM within 3-5 Hours of Suppository Insertion Give Enema   Finasteride Tablet 5 MG Give 1 tablet by mouth one time a day for urinary retention related to RETENTION OF URINE, UNSPECIFIED (R33.9)   Aspirin 81 Oral Tablet Chewable (Aspirin) Give 1 tablet by mouth one time a day related to ATHEROSCLEROTIC HEART DISEASE OF NATIVE CORONARY ARTERY WITHOUT ANGINA PECTORIS (I25.10)   Midodrine HCl Tablet 2.5 MG Give 1 tablet by mouth with meals for hypotension Must remain upright for 1 hour after. Hold dose if SBP greater than 160.    HumaLOG KwikPen Subcutaneous Solution Pen-injector 100 UNIT/ML (Insulin Lispro) Inject 5 unit subcutaneously as needed for BS greater than 250 QID   Pepto-Bismol Oral Suspension (Bismuth Subsalicylate) Give 15 ml by mouth every 6 hours as needed for upset stomach         Mayco Modi MD  Internal Medicine  Senior Care Physician

## 2023-09-27 NOTE — ASSESSMENT & PLAN NOTE
Lab Results   Component Value Date    HGBA1C 6.7 (A) 02/02/2023   pt regularly sees podiatry  Toe touch weight bearing  Cont heel offload orthotic  Cont lymphedema management to facilitate wound healing  PT/OT to eval/tx

## 2023-09-27 NOTE — ASSESSMENT & PLAN NOTE
4/23 LEADS "Rt 50-70% proximal popliteal stenosis  Pt with hx of LT bka in 2018  Pt with hx of noncompliance with meds  Cont asa/plaix/statin

## 2023-09-27 NOTE — ASSESSMENT & PLAN NOTE
Lab Results   Component Value Date    HGBA1C 6.7 (A) 02/02/2023     169.0 mg/dL 9/27/2023 07:43     9/26/2023 19:32 203.0 mg/dL     9/26/2023 07:35 196.0 mg/dL     9/25/2023 19:40 268.0 mg/dL     9/25/2023 19:38 268.0 mg/dL     9/25/2023 07:32 195.0 mg/dL     9/24/2023 19:52 239.0 mg/dL     9/24/2023 08:00 158.0 mg/dL     Sugars stable  Cont lantus 40 units SQ BID  Cont lispro 5 units q.i.d. for sugars >250

## 2023-09-28 ENCOUNTER — NURSING HOME VISIT (OUTPATIENT)
Dept: GERIATRICS | Facility: OTHER | Age: 63
End: 2023-09-28
Payer: COMMERCIAL

## 2023-09-28 VITALS
OXYGEN SATURATION: 99 % | DIASTOLIC BLOOD PRESSURE: 83 MMHG | HEART RATE: 80 BPM | SYSTOLIC BLOOD PRESSURE: 124 MMHG | TEMPERATURE: 97.8 F

## 2023-09-28 DIAGNOSIS — I50.32 CHRONIC HEART FAILURE WITH PRESERVED EJECTION FRACTION (HCC): ICD-10-CM

## 2023-09-28 DIAGNOSIS — K31.84 DIABETIC GASTROPARESIS: Chronic | ICD-10-CM

## 2023-09-28 DIAGNOSIS — Z79.4 TYPE 2 DIABETES MELLITUS WITH DIABETIC NEUROPATHY, WITH LONG-TERM CURRENT USE OF INSULIN (HCC): ICD-10-CM

## 2023-09-28 DIAGNOSIS — I89.0 LYMPHEDEMA OF RIGHT LOWER EXTREMITY: ICD-10-CM

## 2023-09-28 DIAGNOSIS — E11.40 TYPE 2 DIABETES MELLITUS WITH DIABETIC NEUROPATHY, WITH LONG-TERM CURRENT USE OF INSULIN (HCC): ICD-10-CM

## 2023-09-28 DIAGNOSIS — L97.412 DIABETIC ULCER OF RIGHT HEEL ASSOCIATED WITH TYPE 2 DIABETES MELLITUS, WITH FAT LAYER EXPOSED (HCC): Primary | ICD-10-CM

## 2023-09-28 DIAGNOSIS — E11.43 DIABETIC GASTROPARESIS: Chronic | ICD-10-CM

## 2023-09-28 DIAGNOSIS — I25.10 CORONARY ARTERY DISEASE INVOLVING NATIVE CORONARY ARTERY OF NATIVE HEART WITHOUT ANGINA PECTORIS: ICD-10-CM

## 2023-09-28 DIAGNOSIS — E11.621 DIABETIC ULCER OF RIGHT HEEL ASSOCIATED WITH TYPE 2 DIABETES MELLITUS, WITH FAT LAYER EXPOSED (HCC): Primary | ICD-10-CM

## 2023-09-28 PROCEDURE — 99309 SBSQ NF CARE MODERATE MDM 30: CPT

## 2023-09-28 NOTE — ASSESSMENT & PLAN NOTE
Lab Results   Component Value Date    HGBA1C 6.7 (A) 02/02/2023     Improving   Follows with podiatry   Continue local wound care at [facility]  Will follow with wound center on discharge as well as podiatry   Toe touch weight bearing   Continue heel offload orthotic   PT/OT  Continue lymphedema management to facilitate wound heeling

## 2023-09-28 NOTE — ASSESSMENT & PLAN NOTE
Weights stable  No overt signs of overload  Continue daily weights  Continue lasix  Routine monitoring of renal function

## 2023-09-28 NOTE — ASSESSMENT & PLAN NOTE
Lab Results   Component Value Date    HGBA1C 6.7 (A) 02/02/2023   stable   Continue metoclopramide   Continue pepto bismol as needed for upset stomach

## 2023-09-28 NOTE — ASSESSMENT & PLAN NOTE
Continue tubi    Reports he will receive new leg wraps on Wednesday   Continue with compression to lower extremities

## 2023-09-28 NOTE — ASSESSMENT & PLAN NOTE
Lab Results   Component Value Date    HGBA1C 6.7 (A) 02/02/2023   sugars stable   Continue lantus 40 u BID  Continue lispro 5 u QID for BS>250   Encourage regular f/u with ophthalmologist   Regularly follows with podiatrist 4 = No assist / stand by assistance

## 2023-09-28 NOTE — PROGRESS NOTES
[facility]  [address] [facility], [address] [place] [address] Rehab: POS 31  Discharge note    NAME: Luisito Yun  AGE: 58 y.o.  SEX: male  : 1960     DATE: 2023    CODE STATUS: CPR     Assessment and Plan:     Problem List Items Addressed This Visit        Digestive    Diabetic gastroparesis ([address]) (Chronic)       Lab Results   Component Value Date    HGBA1C 6.7 (A) 2023   stable   Continue metoclopramide   Continue pepto bismol as needed for upset stomach             Endocrine    Type 2 diabetes mellitus with diabetic neuropathy, with long-term current use of insulin (HCC)       Lab Results   Component Value Date    HGBA1C 6.7 (A) 2023   sugars stable   Continue lantus 40 u BID  Continue lispro 5 u QID for BS>250   Encourage regular f/u with ophthalmologist   Regularly follows with podiatrist          Diabetic ulcer of right heel ([address]) - Primary       Lab Results   Component Value Date    HGBA1C 6.7 (A) 2023     Improving   Follows with podiatry   Continue local wound care at [facility]  Will follow with wound center on discharge as well as podiatry   Toe touch weight bearing   Continue heel offload orthotic   PT/OT  Continue lymphedema management to facilitate wound heeling            Cardiovascular and Mediastinum    Chronic heart failure with preserved ejection fraction (HCC)     Weights stable  No overt signs of overload  Continue daily weights  Continue lasix  Routine monitoring of renal function           CAD (coronary artery disease)     Continue aspirin, clopidogrel and statin            Other    Lymphedema of right lower extremity     Continue tubi    Reports he will receive new leg wraps on Wednesday   Continue with compression to lower extremities             Discussion with patient/family and further instructions:  -Fall precautions  -Aspiration precautions  -Bleeding precautions  -Monitor for signs/symptoms of infection  -Medication list was reviewed and signed  -DME form was completed     Follow-up Recommendations: Please follow-up with your primary care physician within 7-10 days of discharge to review medication changes and current status. Problem List Follow-up Recommendations:  I have spent 40 minutes with Patient /Family today in which greater than 50% of this time was spent in counseling/coordination of care. History of Present Illness:     Patient is a 58 yr old male being seen at [facility] for follow up of acute and chronic medical conditions. He was recently hospitalized on 8/29 d/t concern for osteomyelitis of right heel wound. He had previous admission for RLE cellulitis and PAD at which time he was evaluated by vascular surgery and noted to have high risk of limb loss in the future. Inpatient  X-ray revealed suspected calcaneal fracture, large plantar ulcer over posterior calcaneous. MRI foot without evidence of osteomyelitis. BCID without any findings. He received 3 days IV of Flagyl and Ancef and transitioned to oral Keflex and Flagyl. He was admitted to [facility] for PT/OT services. On exam today he is doing well. No complaints of CP or SOB. Continues with significant RLE edema d/t lymphedema. He reports physical therapy is going well. He is able to transfer from recliner to wheelchair. He is looking forward to discharging back home with wife tomorrow. The following portions of the patient's history were reviewed and updated as appropriate: allergies, current medications, past family history, past medical history, past social history, past surgical history and problem list.     Review of Systems:     Review of Systems   Cardiovascular: Positive for leg swelling. Chronic; RLE   Skin: Positive for wound.         R heel        Problem List:     Patient Active Problem List   Diagnosis   • Anxiety and depression   • Type 2 diabetes mellitus with diabetic neuropathy, with long-term current use of insulin ([address])   • Hyperlipidemia   • 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   • Vitamin D deficiency   • PAD (peripheral artery disease) ([facility])   • S/P BKA (below knee amputation), left ([facility])   • Orthostatic hypotension   • Diabetic gastroparesis (HCC)   • Class 2 severe obesity due to excess calories with serious comorbidity and body mass index (BMI) of 35.0 to 35.9 in adult [facility])   • Triple vessel coronary artery disease   • Hypertensive heart disease with congestive heart failure ([facility])   • Chronic heart failure with preserved ejection fraction ([facility])   • S/P CABG x 3   • Diabetic autonomic neuropathy associated with type 2 diabetes mellitus ([facility])   • Hyponatremia   • Obstructive sleep apnea   • Phantom limb syndrome without pain ([facility])   • CAD (coronary artery disease)   • Nodular rash   • Elephantiasis nostras verrucosa   • Embolism and thrombosis of arteries of the lower extremities ([facility])   • Lymphedema of right lower extremity   • Venous stasis dermatitis of right lower extremity   • Abdominal distension   • Non-pressure chronic ulcer of right calf with fat layer exposed ([address])   • Cellulitis of right ankle   • Lymphedema in adult patient   • Diabetic ulcer of right heel ([facility])   • Loose stools        Objective:     /83   Pulse 80   Temp 97.8 °F (36.6 °C)   SpO2 99%     Physical Exam  Vitals and nursing note reviewed. Constitutional:       General: He is not in acute distress. Appearance: He is well-developed. HENT:      Head: Normocephalic and atraumatic. Eyes:      Conjunctiva/sclera: Conjunctivae normal.   Cardiovascular:      Rate and Rhythm: Normal rate and regular rhythm. Heart sounds: No murmur heard. Pulmonary:      Effort: Pulmonary effort is normal. No respiratory distress. Breath sounds: Normal breath sounds. Abdominal:      General: There is distension. Palpations: Abdomen is soft. Tenderness: There is no abdominal tenderness. Musculoskeletal:      Cervical back: Neck supple. Right lower leg: Edema present. Left Lower Extremity: Left leg is amputated below knee. Skin:     General: Skin is warm and dry. Capillary Refill: Capillary refill takes less than 2 seconds. Findings: Wound present. Comments: Skin scaling and thickening RLE   Neurological:      General: No focal deficit present. Mental Status: He is alert and oriented to person, place, and time. Mental status is at baseline. Psychiatric:         Mood and Affect: Mood normal.         Behavior: Behavior normal.         Pertinent Laboratory/Diagnostic Studies:    Laboratory Results: I have personally reviewed the pertinent laboratory results/reports     Radiology/Other Diagnostic Testing Results: I have personally reviewed pertinent reports.       Carine Casey, [address] [facility]  Geriatric Medicine

## 2023-09-30 ENCOUNTER — HOME CARE VISIT (OUTPATIENT)
Dept: HOME HEALTH SERVICES | Facility: HOME HEALTHCARE | Age: 63
End: 2023-09-30
Payer: COMMERCIAL

## 2023-09-30 VITALS
SYSTOLIC BLOOD PRESSURE: 118 MMHG | RESPIRATION RATE: 16 BRPM | DIASTOLIC BLOOD PRESSURE: 72 MMHG | OXYGEN SATURATION: 94 % | HEART RATE: 82 BPM | TEMPERATURE: 97.7 F

## 2023-09-30 PROCEDURE — G0299 HHS/HOSPICE OF RN EA 15 MIN: HCPCS

## 2023-09-30 PROCEDURE — 400013 VN SOC

## 2023-10-01 ENCOUNTER — HOME CARE VISIT (OUTPATIENT)
Dept: HOME HEALTH SERVICES | Facility: HOME HEALTHCARE | Age: 63
End: 2023-10-01
Payer: COMMERCIAL

## 2023-10-02 ENCOUNTER — TRANSITIONAL CARE MANAGEMENT (OUTPATIENT)
Dept: FAMILY MEDICINE CLINIC | Facility: CLINIC | Age: 63
End: 2023-10-02

## 2023-10-02 ENCOUNTER — TELEPHONE (OUTPATIENT)
Dept: FAMILY MEDICINE CLINIC | Facility: CLINIC | Age: 63
End: 2023-10-02

## 2023-10-03 ENCOUNTER — HOME CARE VISIT (OUTPATIENT)
Dept: HOME HEALTH SERVICES | Facility: HOME HEALTHCARE | Age: 63
End: 2023-10-03
Payer: COMMERCIAL

## 2023-10-03 ENCOUNTER — OFFICE VISIT (OUTPATIENT)
Dept: WOUND CARE | Facility: CLINIC | Age: 63
End: 2023-10-03
Payer: COMMERCIAL

## 2023-10-03 VITALS — SYSTOLIC BLOOD PRESSURE: 128 MMHG | DIASTOLIC BLOOD PRESSURE: 70 MMHG | HEART RATE: 64 BPM | TEMPERATURE: 97.4 F

## 2023-10-03 DIAGNOSIS — E11.621 DIABETIC ULCER OF RIGHT HEEL ASSOCIATED WITH TYPE 2 DIABETES MELLITUS, WITH FAT LAYER EXPOSED (HCC): Primary | ICD-10-CM

## 2023-10-03 DIAGNOSIS — L97.412 DIABETIC ULCER OF RIGHT HEEL ASSOCIATED WITH TYPE 2 DIABETES MELLITUS, WITH FAT LAYER EXPOSED (HCC): Primary | ICD-10-CM

## 2023-10-03 PROCEDURE — 97597 DBRDMT OPN WND 1ST 20 CM/<: CPT | Performed by: PODIATRIST

## 2023-10-03 RX ORDER — LIDOCAINE 40 MG/G
CREAM TOPICAL ONCE
Status: COMPLETED | OUTPATIENT
Start: 2023-10-03 | End: 2023-10-03

## 2023-10-03 RX ADMIN — LIDOCAINE: 40 CREAM TOPICAL at 11:49

## 2023-10-03 NOTE — PATIENT INSTRUCTIONS
Orders Placed This Encounter   Procedures    Debridement     This order was created via procedure documentation    Wound cleansing and dressings     Wound cleansing and dressings     RIGHT HEEL WOUND:  Do not get wound or dressing wet, sponge bath at this time  Cleanse wounds with normal saline. Pat dry. 10 minute DAKINS SOAK prior to each dressing change   Rinse with saline and pat dry  Apply silver alginate to wound bed  Cover with gauze and ABD  Secure with kerlix and paper tape  Change dressing 3 x per week; may change top dressing as needed for excessive drainage    Tubular elastic bandage over all: Spandagrip Size H  Apply from base of toes to behind the knee. Apply in AM, may remove for sleep. Non-weight bearing and keep pressure off heel wound as much as humanly possible  Keep pressure off right heel wound as much as humanly possible, DO NOT SIT WITH FOOT RESTING ON FLOOR   Toe touch transfers are ok. However, when you do transfer please wear heel offloading shoe. Elevate leg(s) above the level of the heart when sitting or as much as possible. Make sure you are getting 3-4 servings protein per day in diet  Consider low sugar protein shake supplements at least daily     Continue skilled VNA for dressing changes 3 x per week (Tu, TH, Sat), skipping day patient comes to wound center    Follow up Dr. Araceli Grijalva in 2 weeks. Today's wound treatment note:   Per Dr. Araceli Grijalva, cleanse wound with NSS and apply dakin's moistened gauze dressing today in wound center only  Gauze and ABD applied over moist Dakin's and secured with kerlix and paper tape.      Standing Status:   Future     Standing Expiration Date:   10/3/2024

## 2023-10-03 NOTE — PROGRESS NOTES
Patient ID: Roxann Whalen is a 58 y.o. male Date of Birth 1960     Diagnosis:  1. Diabetic ulcer of right heel associated with type 2 diabetes mellitus, with fat layer exposed ([address])  -     lidocaine (LMX) 4 % cream  -     Debridement  -     Wound cleansing and dressings; Future       Diagnosis ICD-10-CM Associated Orders   1. Diabetic ulcer of right heel associated with type 2 diabetes mellitus, with fat layer exposed ([address])  E11.621 lidocaine (LMX) 4 % cream    L97.412 Debridement     Wound cleansing and dressings             Assessment & Plan:  -Debridement today: see below, excisional debridement  -Dressings: dakins wet to dry today. Home nurses to perform dakins soak for 15 minutes then silver alginate, DSD, ABD, Kerlex. Dressings to be changed 3x/week  -Offloading plan: toe touch to transfer  -Signs of infection today: pseudomonal odor/green drainage.   -Risk factors: ambulatory dysfunction, severe obesity, neuropathy, lack of self care  -RTC: 2 weeks      Chief Complaint   Patient presents with   • Follow Up Wound Care Visit     R heel wound. Patient was hospitalized and in rehab since his last visit here. He has been home since Saturday and has VNA scheduled 3 x per week. Subjective:   PAtient presents for wound care right heel. He discharged from the rehab facility this weekend and is back home. The wounds had been draining more because he states that it is hard to keep it as elevated as he was at the facility. Overall he states he feels well.       The following portions of the patient's history were reviewed and updated as appropriate:   Patient Active Problem List   Diagnosis   • Anxiety and depression   • Type 2 diabetes mellitus with diabetic neuropathy, with long-term current use of insulin ([address])   • Hyperlipidemia   • 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   • Vitamin D deficiency   • PAD (peripheral artery disease) ([facility])   • S/P BKA (below knee amputation), left (HCC)   • Orthostatic hypotension   • Diabetic gastroparesis    • Class 2 severe obesity due to excess calories with serious comorbidity and body mass index (BMI) of 35.0 to 35.9 in adult    • Triple vessel coronary artery disease   • Hypertensive heart disease with congestive heart failure (HCC)   • Chronic heart failure with preserved ejection fraction (HCC)   • S/P CABG x 3   • Diabetic autonomic neuropathy associated with type 2 diabetes mellitus (HCC)   • Hyponatremia   • Obstructive sleep apnea   • Phantom limb syndrome without pain (HCC)   • CAD (coronary artery disease)   • Nodular rash   • Elephantiasis nostras verrucosa   • Embolism and thrombosis of arteries of the lower extremities (HCC)   • Lymphedema of right lower extremity   • Venous stasis dermatitis of right lower extremity   • Abdominal distension   • Non-pressure chronic ulcer of right calf with fat layer exposed ([address])   • Cellulitis of right ankle   • Lymphedema in adult patient   • Diabetic ulcer of right heel (HCC)   • Loose stools     Past Medical History:   Diagnosis Date   • Amputated below knee, left ([address]) 10/4/2018   • Anxiety    • Anxiety and depression    • Cataract    • Congestive cardiac failure ([address])    • Coronary artery disease    • Depression    • Diabetes mellitus ([address])    • Diabetic autonomic neuropathy associated with type 2 diabetes mellitus ([address])     Last Assessed: 12/28/2017   • History of DVT (deep vein thrombosis)     left LE   • Hyperlipidemia    • Lymphedema of right lower extremity    • Obesity    • Orthostatic hypotension      Past Surgical History:   Procedure Laterality Date   • CORONARY ARTERY BYPASS GRAFT     • EYE SURGERY      cataracts bilateral   • LEG AMPUTATION THROUGH LOWER TIBIA AND FIBULA Left 8/3/2018    Procedure: AMPUTATION BELOW KNEE (BKA) L BKA;  Surgeon: Carlos Eduardo Siu MD;  Location: [facility];  Service: Vascular   • SC CORONARY ARTERY BYP W/VEIN & ARTERY GRAFT 4 VEIN N/A 3/28/2018    Procedure: CORONARY ARTERY BYPASS GRAFT (CABG) x3 VESSELS, LIMA TO LAD, SVG--> PDA, SVG--> OM2,  RIGHT LEG EVH/SVH TO , INTRA-OP AMANDEEP;  Surgeon: Lashae Brenner MD;  Location: [facility];  Service: Cardiac Surgery   • ROTATOR CUFF REPAIR Bilateral      Social History     Socioeconomic History   • Marital status: /Civil Union     Spouse name: None   • Number of children: None   • Years of education: None   • Highest education level: None   Occupational History   • None   Tobacco Use   • Smoking status: Former     Packs/day: 1.00     Years: 12.00     Total pack years: 12.00     Types: Cigarettes     Quit date: 3/23/1994     Years since quittin.5   • Smokeless tobacco: Never   Vaping Use   • Vaping Use: Never used   Substance and Sexual Activity   • Alcohol use: No     Comment: rarely   • Drug use: No   • Sexual activity: Not Currently   Other Topics Concern   • None   Social History Narrative   • None     Social Determinants of Health     Financial Resource Strain: Not on file   Food Insecurity: No Food Insecurity (2023)    Hunger Vital Sign    • Worried About Running Out of Food in the Last Year: Never true    • Ran Out of Food in the Last Year: Never true   Transportation Needs: No Transportation Needs (2023)    PRAPARE - Transportation    • Lack of Transportation (Medical): No    • Lack of Transportation (Non-Medical): No   Physical Activity: Not on file   Stress: Not on file   Social Connections: Moderately Isolated (10/29/2020)    Social Connection and Isolation Panel [NHANES]    • Frequency of Communication with Friends and Family: Three times a week    • Frequency of Social Gatherings with Friends and Family:  Three times a week    • Attends Synagogue Services: Never    • Active Member of Clubs or Organizations: No    • Attends Club or Organization Meetings: Never    • Marital Status:    Intimate Partner Violence: Not At Risk (10/29/2020)    Humiliation, Afraid, Rape, and Kick questionnaire    • Fear of Current or Ex-Partner: No    • Emotionally Abused: No    • Physically Abused: No    • Sexually Abused: No   Housing Stability: Low Risk  (8/30/2023)    Housing Stability Vital Sign    • Unable to Pay for Housing in the Last Year: No    • Number of Places Lived in the Last Year: 1    • Unstable Housing in the Last Year: No        Current Outpatient Medications:   •  ammonium lactate (LAC-HYDRIN) 12 % lotion, Apply 1 Application topically in the morning, Disp: 222 mL, Rfl: 0  •  aspirin (ECOTRIN LOW STRENGTH) 81 mg EC tablet, Take 81 mg by mouth daily, Disp: , Rfl:   •  atorvastatin (LIPITOR) 80 mg tablet, Take 1 tablet (80 mg total) by mouth daily, Disp: 90 tablet, Rfl: 1  •  cholecalciferol (VITAMIN D3) 1,000 units tablet, Take 1,000 Units by mouth daily, Disp: , Rfl:   •  clopidogrel (PLAVIX) 75 mg tablet, TAKE 1 TABLET BY MOUTH DAILY, Disp: 90 tablet, Rfl: 0  •  finasteride (PROSCAR) 5 mg tablet, TAKE 1 TABLET BY MOUTH DAILY, Disp: 100 tablet, Rfl: 2  •  furosemide (LASIX) 40 mg tablet, TAKE 1 TABLET BY MOUTH TWICE  DAILY, Disp: 200 tablet, Rfl: 2  •  glucose blood test strip, ReliOn Prime Meter, Disp: , Rfl:   •  Insulin Glargine Solostar (Lantus SoloStar) 100 UNIT/ML SOPN, Inject 0.4 mL (40 Units total) under the skin 2 (two) times a day This is dosing for rehab on discharge, Disp: , Rfl: 0  •  metoclopramide (REGLAN) 5 mg tablet, Take 1 tablet (5 mg total) by mouth daily, Disp: 90 tablet, Rfl: 1  •  midodrine (PROAMATINE) 2.5 mg tablet, Take 2.5 mg by mouth 3 (three) times a day before meals PT STATED HE IS NOT TAKING THIS AND DOES NOT WANT TO. , Disp: , Rfl:   •  pantoprazole (PROTONIX) 40 mg tablet, TAKE 1 TABLET BY MOUTH DAILY IN  THE EARLY MORNING, Disp: 90 tablet, Rfl: 0  •  sodium hypochlorite (DAKIN'S QUARTER-STRENGTH), Irrigate with 1 Application as directed daily (Patient not taking: Reported on 9/19/2023), Disp: 473 mL, Rfl: 1  •  tamsulosin (FLOMAX) 0.4 mg, Take 1 capsule (0.4 mg total) by mouth daily with dinner, Disp: 90 capsule, Rfl: 1  No current facility-administered medications for this visit. Family History   Problem Relation Age of Onset   • Atrial fibrillation Mother    • Stroke Mother    • Hypertension Father    • Heart attack Paternal Grandfather 61   • Diabetes Maternal Grandmother    • Diabetes Maternal Grandfather    • Diabetes Maternal Aunt    • Diabetes Maternal Uncle       Review of Systems   Constitutional: Negative. Gastrointestinal: Negative for diarrhea, nausea and vomiting. Musculoskeletal: Positive for gait problem. Skin: Positive for color change and wound. Neurological: Positive for weakness and numbness. Allergies:  Patient has no known allergies. Objective:  /70   Pulse 64   Temp (!) 97.4 °F (36.3 °C)     Physical Exam  Vitals reviewed. Constitutional:       Appearance: He is obese. He is not ill-appearing or diaphoretic. Pulmonary:      Effort: No respiratory distress. Musculoskeletal:         General: Deformity (Left below-knee amputation) present. Right lower leg: Edema (Severe right lower extremity lymphedema with heavy green drainage from the foot wound) present. Neurological:      Sensory: Sensory deficit present. Wound 07/21/23 Diabetic Ulcer Heel Right (Active)   Enter Miroslava García score: Miroslava García Grade 1: Partial or full-thickness ulcer (superficial) 10/03/23 1108   Wound Image   10/03/23 1056   Wound Description Slough; Necrotic;Pink 10/03/23 1108   Anya-wound Assessment Maceration; Intact 10/03/23 1108   Wound Length (cm) 4.7 cm 10/03/23 1108   Wound Width (cm) 4.3 cm 10/03/23 1108   Wound Depth (cm) 0.6 cm 10/03/23 1108   Wound Surface Area (cm^2) 20.21 cm^2 10/03/23 1108   Wound Volume (cm^3) 12.126 cm^3 10/03/23 1108   Calculated Wound Volume (cm^3) 12.13 cm^3 10/03/23 1108   Change in Wound Size % -886.18 10/03/23 1108   Undermining 0.6 09/19/23 1119   Undermining is depth extending from 8 o'clock to 2 o'clock ( toes at 12 o'clock) 09/19/23 1119   Drainage Amount Moderate 10/03/23 1108   Drainage Description Green; Foul smelling;Serous; Yellow 10/03/23 1108   Non-staged Wound Description Full thickness 10/03/23 1108   Treatments Irrigation with NSS 09/19/23 1119   Dressing Changed Changed 08/07/23 1419   Patient Tolerance Tolerated well 09/19/23 1119   Dressing Status Removed 09/19/23 1119                         Debridement   Universal Protocol:  Consent: Verbal consent obtained. Risks and benefits: risks, benefits and alternatives were discussed  Consent given by: patient  Time out: Immediately prior to procedure a "time out" was called to verify the correct patient, procedure, equipment, support staff and site/side marked as required.   Timeout called at: 10/3/2023 11:25 AM.  Patient understanding: patient states understanding of the procedure being performed  Patient identity confirmed: verbally with patient      Performed by: physician  Debridement type: selective  Pain control: lidocaine 4%  Post-debridement measurements  Length (cm): 5  Width (cm): 5  Depth (cm): 0.8  Percent debrided: 75%  Surface Area (cm^2): 25  Area debrided (cm^2): 18.75  Volume (cm^3): 20  Devitalized tissue debrided: biofilm, clots, exudate, fibrin, necrotic debris, slough and eschar  Instrument(s) utilized: blade and forceps  Bleeding: large  Hemostasis obtained with: silver nitrate  Procedural pain (0-10): insensate  Post-procedural pain: insensate   Response to treatment: procedure was tolerated well            Results from last 6 Months   Lab Units 08/29/23  1130   WOUND CULTURE  3+ Growth of Escherichia coli*  3+ Growth of Proteus mirabilis*  3+ Growth of           Wound Instructions:  Orders Placed This Encounter   Procedures   • Debridement     This order was created via procedure documentation   • Wound cleansing and dressings     Wound cleansing and dressings     RIGHT HEEL WOUND:  Do not get wound or dressing wet, sponge bath at this time  Cleanse wounds with normal saline. Pat dry. 10 minute DAKINS SOAK prior to each dressing change   Rinse with saline and pat dry  Apply silver alginate to wound bed  Cover with gauze and ABD  Secure with kerlix and paper tape  Change dressing 3 x per week; may change top dressing as needed for excessive drainage    Tubular elastic bandage over all: Spandagrip Size H  Apply from base of toes to behind the knee. Apply in AM, may remove for sleep. Non-weight bearing and keep pressure off heel wound as much as humanly possible  Keep pressure off right heel wound as much as humanly possible, DO NOT SIT WITH FOOT RESTING ON FLOOR   Toe touch transfers are ok. However, when you do transfer please wear heel offloading shoe. Elevate leg(s) above the level of the heart when sitting or as much as possible.      Make sure you are getting 3-4 servings protein per day in diet  Consider low sugar protein shake supplements at least daily     Continue skilled VNA for dressing changes 3 x per week (Tu, TH, Sat), skipping day patient comes to wound center    Follow up Dr. Benito Escobar in 2 weeks.      Today's wound treatment note:   Per Dr. Benito Escobar, cleanse wound with NSS and apply dakin's moistened gauze dressing today in wound center only  Gauze and ABD applied over moist Dakin's and secured with kerlix and paper tape. Standing Status:   Future     Standing Expiration Date:   10/3/2024         Jessica Flores DPM      Portions of the record may have been created with voice recognition software. Occasional wrong word or "sound a like" substitutions may have occurred due to the inherent limitations of voice recognition software. Read the chart carefully and recognize, using context, where substitutions have occurred.

## 2023-10-04 ENCOUNTER — TELEPHONE (OUTPATIENT)
Age: 63
End: 2023-10-04

## 2023-10-04 NOTE — TELEPHONE ENCOUNTER
[facility] called to ask if it is okay to give the pt the brand name of Insulin Glargine Solostar. Via tiger text Dr. Gaurav Moise stated that it was okay. I called pharmacy and relayed the message.

## 2023-10-05 ENCOUNTER — HOME CARE VISIT (OUTPATIENT)
Dept: HOME HEALTH SERVICES | Facility: HOME HEALTHCARE | Age: 63
End: 2023-10-05
Payer: COMMERCIAL

## 2023-10-05 PROCEDURE — G0299 HHS/HOSPICE OF RN EA 15 MIN: HCPCS

## 2023-10-06 VITALS
DIASTOLIC BLOOD PRESSURE: 70 MMHG | TEMPERATURE: 97.2 F | RESPIRATION RATE: 18 BRPM | OXYGEN SATURATION: 95 % | HEART RATE: 72 BPM | SYSTOLIC BLOOD PRESSURE: 132 MMHG

## 2023-10-07 ENCOUNTER — HOME CARE VISIT (OUTPATIENT)
Dept: HOME HEALTH SERVICES | Facility: HOME HEALTHCARE | Age: 63
End: 2023-10-07
Payer: COMMERCIAL

## 2023-10-07 PROCEDURE — G0300 HHS/HOSPICE OF LPN EA 15 MIN: HCPCS

## 2023-10-08 VITALS
TEMPERATURE: 96.9 F | HEART RATE: 80 BPM | SYSTOLIC BLOOD PRESSURE: 150 MMHG | OXYGEN SATURATION: 93 % | RESPIRATION RATE: 16 BRPM | DIASTOLIC BLOOD PRESSURE: 78 MMHG

## 2023-10-10 ENCOUNTER — HOME CARE VISIT (OUTPATIENT)
Dept: HOME HEALTH SERVICES | Facility: HOME HEALTHCARE | Age: 63
End: 2023-10-10
Payer: COMMERCIAL

## 2023-10-10 PROCEDURE — G0300 HHS/HOSPICE OF LPN EA 15 MIN: HCPCS

## 2023-10-11 VITALS
HEART RATE: 80 BPM | DIASTOLIC BLOOD PRESSURE: 74 MMHG | TEMPERATURE: 97.9 F | SYSTOLIC BLOOD PRESSURE: 150 MMHG | OXYGEN SATURATION: 92 % | RESPIRATION RATE: 24 BRPM

## 2023-10-12 ENCOUNTER — HOME CARE VISIT (OUTPATIENT)
Dept: HOME HEALTH SERVICES | Facility: HOME HEALTHCARE | Age: 63
End: 2023-10-12
Payer: COMMERCIAL

## 2023-10-12 VITALS
TEMPERATURE: 98.5 F | SYSTOLIC BLOOD PRESSURE: 168 MMHG | OXYGEN SATURATION: 96 % | RESPIRATION RATE: 20 BRPM | HEART RATE: 80 BPM | DIASTOLIC BLOOD PRESSURE: 86 MMHG

## 2023-10-12 PROCEDURE — G0299 HHS/HOSPICE OF RN EA 15 MIN: HCPCS

## 2023-10-12 PROCEDURE — G0180 MD CERTIFICATION HHA PATIENT: HCPCS | Performed by: INTERNAL MEDICINE

## 2023-10-13 ENCOUNTER — HOME CARE VISIT (OUTPATIENT)
Dept: HOME HEALTH SERVICES | Facility: HOME HEALTHCARE | Age: 63
End: 2023-10-13
Payer: COMMERCIAL

## 2023-10-13 NOTE — CASE COMMUNICATION
Patient has appointment with Podiatry on 10/17/23 -  please make sn visit for that date a missed visit since no nursing visit needed on that day

## 2023-10-13 NOTE — CASE COMMUNICATION
Ship to  Clinician -  XX    Insurance [employer] [facility]    Wound 1  R heel      Full  XX      Calcium alginate  Alginate with Silver [id number] . Darylene Pipe Darylene Pipe 2

## 2023-10-13 NOTE — CASE COMMUNICATION
Ship to   Pt. Home XX    Insurance [employer] [facility]    Wound 1  R heel      Full  XX      Gauze Sunny stretch roll 4inch n/s 12 rolls per unit  [id number] . .. 1    ABD 5x9 [id number] . Micki Escobar ..6    Tape  Transpore white  2in [id number] . ..1    Calcium alginate  Alginate with Silver [id number] . Micki Escobar 6

## 2023-10-14 ENCOUNTER — HOME CARE VISIT (OUTPATIENT)
Dept: HOME HEALTH SERVICES | Facility: HOME HEALTHCARE | Age: 63
End: 2023-10-14
Payer: COMMERCIAL

## 2023-10-14 PROCEDURE — G0299 HHS/HOSPICE OF RN EA 15 MIN: HCPCS

## 2023-10-15 VITALS
OXYGEN SATURATION: 95 % | TEMPERATURE: 98.2 F | HEART RATE: 88 BPM | SYSTOLIC BLOOD PRESSURE: 138 MMHG | RESPIRATION RATE: 24 BRPM | DIASTOLIC BLOOD PRESSURE: 70 MMHG

## 2023-10-17 ENCOUNTER — OFFICE VISIT (OUTPATIENT)
Dept: WOUND CARE | Facility: CLINIC | Age: 63
End: 2023-10-17
Payer: COMMERCIAL

## 2023-10-17 ENCOUNTER — HOME CARE VISIT (OUTPATIENT)
Dept: HOME HEALTH SERVICES | Facility: HOME HEALTHCARE | Age: 63
End: 2023-10-17
Payer: COMMERCIAL

## 2023-10-17 VITALS
SYSTOLIC BLOOD PRESSURE: 132 MMHG | DIASTOLIC BLOOD PRESSURE: 80 MMHG | RESPIRATION RATE: 16 BRPM | TEMPERATURE: 96.9 F | HEART RATE: 79 BPM

## 2023-10-17 DIAGNOSIS — E11.621 DIABETIC ULCER OF RIGHT HEEL ASSOCIATED WITH TYPE 2 DIABETES MELLITUS, WITH FAT LAYER EXPOSED (HCC): Primary | ICD-10-CM

## 2023-10-17 DIAGNOSIS — L97.412 DIABETIC ULCER OF RIGHT HEEL ASSOCIATED WITH TYPE 2 DIABETES MELLITUS, WITH FAT LAYER EXPOSED (HCC): Primary | ICD-10-CM

## 2023-10-17 PROCEDURE — 99214 OFFICE O/P EST MOD 30 MIN: CPT | Performed by: PODIATRIST

## 2023-10-17 NOTE — PROGRESS NOTES
Patient ID: Greta Mendoza is a 58 y.o. male Date of Birth 1960     Diagnosis:  1. Diabetic ulcer of right heel associated with type 2 diabetes mellitus, with fat layer exposed ([address])  -     Wound cleansing and dressings; Future  -     Hyperbaric oxygen thearpy; Future       Diagnosis ICD-10-CM Associated Orders   1. Diabetic ulcer of right heel associated with type 2 diabetes mellitus, with fat layer exposed ([address])  E11.621 Wound cleansing and dressings    L97.412 Hyperbaric oxygen thearpy           Assessment & Plan:  Wound is deteriorating. He has difficulty elevating and it drains heavily from his uncontrolled lymphedema. Dakins soaked gauze 24/7 to the heel. I am recommending we try HBO. HE now has local gangrene of the heel (Friend 4) and without drastic measures he is risking another BKA. Standard wound care is failing and he is unable to manage day to day basic care and hygiene. He isn't sure he wants to look into HBO. I have made him a consult appointment that I hope he comes to. HE is very high risk for amputation    No chief complaint on file. Subjective:   Right heel wound care. He feels well. His home nurse contacted me and is worried about the appearance of the heel.        The following portions of the patient's history were reviewed and updated as appropriate:   Patient Active Problem List   Diagnosis   • Anxiety and depression   • Type 2 diabetes mellitus with diabetic neuropathy, with long-term current use of insulin ([address])   • Hyperlipidemia   • 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   • Vitamin D deficiency   • PAD (peripheral artery disease) ([facility])   • S/P BKA (below knee amputation), left ([facility])   • Orthostatic hypotension   • Diabetic gastroparesis    • Class 2 severe obesity due to excess calories with serious comorbidity and body mass index (BMI) of 35.0 to 35.9 in adult    • Triple vessel coronary artery disease   • Hypertensive heart disease with congestive heart failure (HCC)   • Chronic heart failure with preserved ejection fraction (HCC)   • S/P CABG x 3   • Diabetic autonomic neuropathy associated with type 2 diabetes mellitus ([facility])   • Hyponatremia   • Obstructive sleep apnea   • Phantom limb syndrome without pain ([facility])   • CAD (coronary artery disease)   • Nodular rash   • Elephantiasis [place]   • Embolism and thrombosis of arteries of the lower extremities ([facility])   • Lymphedema of right lower extremity   • Venous stasis dermatitis of right lower extremity   • Abdominal distension   • Non-pressure chronic ulcer of right calf with fat layer exposed ([address])   • Cellulitis of right ankle   • Lymphedema in adult patient   • Diabetic ulcer of right heel ([facility])   • Loose stools     Past Medical History:   Diagnosis Date   • Amputated below knee, left ([address]) 10/4/2018   • Anxiety    • Anxiety and depression    • Cataract    • Congestive cardiac failure ([address])    • Coronary artery disease    • Depression    • Diabetes mellitus ([address])    • Diabetic autonomic neuropathy associated with type 2 diabetes mellitus ([address])     Last Assessed: 12/28/2017   • History of DVT (deep vein thrombosis)     left LE   • Hyperlipidemia    • Lymphedema of right lower extremity    • Obesity    • Orthostatic hypotension      Past Surgical History:   Procedure Laterality Date   • CORONARY ARTERY BYPASS GRAFT     • EYE SURGERY      cataracts bilateral   • LEG AMPUTATION THROUGH LOWER TIBIA AND FIBULA Left 8/3/2018    Procedure: AMPUTATION BELOW KNEE (BKA) L BKA;  Surgeon: Cindi Evangelista MD;  Location: [facility];  Service: Vascular   • OK CORONARY ARTERY BYP W/VEIN & ARTERY GRAFT 4 VEIN N/A 3/28/2018    Procedure: CORONARY ARTERY BYPASS GRAFT (CABG) x3 VESSELS, LIMA TO LAD, SVG--> PDA, SVG--> OM2,  RIGHT LEG EVH/SVH TO , INTRA-OP AMANDEEP;  Surgeon: Mirella Sheffield MD;  Location: [facility];  Service: Cardiac Surgery   • ROTATOR CUFF REPAIR Bilateral      Social History Socioeconomic History   • Marital status: /Civil Union     Spouse name: None   • Number of children: None   • Years of education: None   • Highest education level: None   Occupational History   • None   Tobacco Use   • Smoking status: Former     Packs/day: 1.00     Years: 12.00     Total pack years: 12.00     Types: Cigarettes     Quit date: 3/23/1994     Years since quittin.5   • Smokeless tobacco: Never   Vaping Use   • Vaping Use: Never used   Substance and Sexual Activity   • Alcohol use: No     Comment: rarely   • Drug use: No   • Sexual activity: Not Currently   Other Topics Concern   • None   Social History Narrative   • None     Social Determinants of Health     Financial Resource Strain: Not on file   Food Insecurity: No Food Insecurity (2023)    Hunger Vital Sign    • Worried About Running Out of Food in the Last Year: Never true    • Ran Out of Food in the Last Year: Never true   Transportation Needs: No Transportation Needs (2023)    PRAPARE - Transportation    • Lack of Transportation (Medical): No    • Lack of Transportation (Non-Medical): No   Physical Activity: Not on file   Stress: Not on file   Social Connections: Moderately Isolated (10/29/2020)    Social Connection and Isolation Panel [NHANES]    • Frequency of Communication with Friends and Family: Three times a week    • Frequency of Social Gatherings with Friends and Family:  Three times a week    • Attends Zoroastrianism Services: Never    • Active Member of Clubs or Organizations: No    • Attends Club or Organization Meetings: Never    • Marital Status:    Intimate Partner Violence: Not At Risk (10/29/2020)    Humiliation, Afraid, Rape, and Kick questionnaire    • Fear of Current or Ex-Partner: No    • Emotionally Abused: No    • Physically Abused: No    • Sexually Abused: No   Housing Stability: Low Risk  (2023)    Housing Stability Vital Sign    • Unable to Pay for Housing in the Last Year: No    • Number of Places Lived in the Last Year: 1    • Unstable Housing in the Last Year: No        Current Outpatient Medications:   •  ammonium lactate (LAC-HYDRIN) 12 % lotion, Apply 1 Application topically in the morning, Disp: 222 mL, Rfl: 0  •  aspirin (ECOTRIN LOW STRENGTH) 81 mg EC tablet, Take 81 mg by mouth daily, Disp: , Rfl:   •  atorvastatin (LIPITOR) 80 mg tablet, Take 1 tablet (80 mg total) by mouth daily, Disp: 90 tablet, Rfl: 1  •  cholecalciferol (VITAMIN D3) 1,000 units tablet, Take 1,000 Units by mouth daily, Disp: , Rfl:   •  clopidogrel (PLAVIX) 75 mg tablet, TAKE 1 TABLET BY MOUTH DAILY, Disp: 90 tablet, Rfl: 0  •  finasteride (PROSCAR) 5 mg tablet, TAKE 1 TABLET BY MOUTH DAILY, Disp: 100 tablet, Rfl: 2  •  furosemide (LASIX) 40 mg tablet, TAKE 1 TABLET BY MOUTH TWICE  DAILY, Disp: 200 tablet, Rfl: 2  •  glucose blood test strip, ReliOn Prime Meter, Disp: , Rfl:   •  Insulin Glargine Solostar (Lantus SoloStar) 100 UNIT/ML SOPN, Inject 0.4 mL (40 Units total) under the skin 2 (two) times a day This is dosing for rehab on discharge, Disp: , Rfl: 0  •  metoclopramide (REGLAN) 5 mg tablet, Take 1 tablet (5 mg total) by mouth daily, Disp: 90 tablet, Rfl: 1  •  midodrine (PROAMATINE) 2.5 mg tablet, Take 2.5 mg by mouth 3 (three) times a day before meals PT STATED HE IS NOT TAKING THIS AND DOES NOT WANT TO. , Disp: , Rfl:   •  pantoprazole (PROTONIX) 40 mg tablet, TAKE 1 TABLET BY MOUTH DAILY IN  THE EARLY MORNING, Disp: 90 tablet, Rfl: 0  •  sodium hypochlorite (DAKIN'S QUARTER-STRENGTH), Irrigate with 1 Application as directed daily, Disp: 473 mL, Rfl: 1  •  tamsulosin (FLOMAX) 0.4 mg, Take 1 capsule (0.4 mg total) by mouth daily with dinner, Disp: 90 capsule, Rfl: 1  Family History   Problem Relation Age of Onset   • Atrial fibrillation Mother    • Stroke Mother    • Hypertension Father    • Heart attack Paternal Grandfather 61   • Diabetes Maternal Grandmother    • Diabetes Maternal Grandfather    • Diabetes Maternal Aunt    • Diabetes Maternal Uncle       Review of Systems   Constitutional: Negative. Cardiovascular:  Positive for leg swelling. Gastrointestinal:  Negative for diarrhea, nausea and vomiting. Musculoskeletal:  Positive for joint swelling. Skin:  Positive for color change and wound. Neurological:  Positive for weakness and numbness. Allergies:  Patient has no known allergies. Objective:  /80   Pulse 79   Temp (!) 96.9 °F (36.1 °C)   Resp 16     Physical Exam  Vitals reviewed. Constitutional:       Appearance: He is obese. He is not ill-appearing or diaphoretic. Musculoskeletal:      Right lower leg: Edema (severe RLE lymphedema) present. Feet:    Skin:     Capillary Refill: Capillary refill takes 2 to 3 seconds. Neurological:      Mental Status: He is alert. Sensory: Sensory deficit present. Wound 07/21/23 Diabetic Ulcer Heel Right (Active)   Enter Camelia Quijano score: Camelia Quijano Grade 3: Deep abscess, OM, or joint sepsis 10/17/23 0957   Wound Image   10/17/23 0937   Wound Description Black; Yellow;Slough;Pink;Brown 10/17/23 0940   Anya-wound Assessment Maceration 10/17/23 0940   Wound Length (cm) 5.5 cm 10/17/23 0940   Wound Width (cm) 6 cm 10/17/23 0940   Wound Depth (cm) 0.8 cm 10/17/23 0940   Wound Surface Area (cm^2) 33 cm^2 10/17/23 0940   Wound Volume (cm^3) 26.4 cm^3 10/17/23 0940   Calculated Wound Volume (cm^3) 26.4 cm^3 10/17/23 0940   Change in Wound Size % -2046.34 10/17/23 0940   Undermining 0.6 09/19/23 1119   Undermining is depth extending from 8 o'clock to 2 o'clock ( toes at 12 o'clock) 09/19/23 1119   Drainage Amount Copious 10/17/23 0940   Drainage Description Green; Foul smelling;Tan;Brown;Serosanguineous 10/17/23 0940   Non-staged Wound Description Full thickness 10/17/23 0940   Treatments Irrigation with NSS 09/19/23 1119   Dressing Changed Changed 08/07/23 1419   Patient Tolerance Tolerated well 09/19/23 1119   Dressing Status Leaking (Comment) 10/17/23 0940                         Procedures     Results from last 6 Months   Lab Units 08/29/23  1130   WOUND CULTURE  3+ Growth of Escherichia coli*  3+ Growth of Proteus mirabilis*  3+ Growth of           Wound Instructions:  Orders Placed This Encounter   Procedures   • Wound cleansing and dressings     Wound cleansing and dressings          Do not get wound or dressing wet, sponge bath at this time  Cleanse wounds with mild soap & water. Pat dry. RIGHT HEEL WOUND:  Apply dakin's soaked gauze to wound bed  Cover with gauze and ABD  Secure with kerlix and paper tape  Change dressing 3x week. May change top dressing as needed for excessive drainage     Tubular elastic bandage over all: Spandagrip Size G  Apply from base of toes to behind the knee. Apply in AM, may remove for sleep. Non-weight bearing and keep pressure off heel wound as much as humanly possible  Keep pressure off right heel wound as much as humanly possible, DO NOT SIT WITH FOOT RESTING ON FLOOR   Toe touch transfers are ok. However, when you do transfer please wear heel offloading shoe. Elevate leg(s) above the level of the heart when sitting or as much as possible. Make sure you are getting 3-4 servings protein per day in diet  Consider low sugar protein shake supplements at least daily     Continue skilled VNA for dressing changes, skipping days patient comes to wound center     Follow up Dr. Sharifa Gutierrez in 2 weeks. Follow up with Dr. Isabel Guerra in 1 week for hyperbaric consult. Today's wound treatment note: We cleansed wound with NSS and applied dakin's moistened gauze dressing. ABD applied & secured with kerlix and paper tape.      Standing Status:   Future     Standing Expiration Date:   10/17/2024   • Hyperbaric oxygen thearpy     Standing Status:   Future     Standing Expiration Date:   10/17/2024         Michelle Pisano DPM      Portions of the record may have been created with voice recognition software. Occasional wrong word or "sound a like" substitutions may have occurred due to the inherent limitations of voice recognition software. Read the chart carefully and recognize, using context, where substitutions have occurred.

## 2023-10-17 NOTE — PATIENT INSTRUCTIONS
Orders Placed This Encounter   Procedures    Wound cleansing and dressings     Wound cleansing and dressings          Do not get wound or dressing wet, sponge bath at this time  Cleanse wounds with mild soap & water. Pat dry. RIGHT HEEL WOUND:  Apply dakin's soaked gauze to wound bed  Cover with gauze and ABD  Secure with kerlix and paper tape  Change dressing 3x week. May change top dressing as needed for excessive drainage     Tubular elastic bandage over all: Spandagrip Size G  Apply from base of toes to behind the knee. Apply in AM, may remove for sleep. Non-weight bearing and keep pressure off heel wound as much as humanly possible  Keep pressure off right heel wound as much as humanly possible, DO NOT SIT WITH FOOT RESTING ON FLOOR   Toe touch transfers are ok. However, when you do transfer please wear heel offloading shoe. Elevate leg(s) above the level of the heart when sitting or as much as possible. Make sure you are getting 3-4 servings protein per day in diet  Consider low sugar protein shake supplements at least daily     Continue skilled VNA for dressing changes, skipping days patient comes to wound center     Follow up Dr. Carleen Orr in 2 weeks. Follow up with Dr. Fariba Frank in 1 week for hyperbaric consult. Today's wound treatment note: We cleansed wound with NSS and applied dakin's moistened gauze dressing. ABD applied & secured with kerlix and paper tape.      Standing Status:   Future     Standing Expiration Date:   10/17/2024

## 2023-10-19 ENCOUNTER — APPOINTMENT (EMERGENCY)
Dept: CT IMAGING | Facility: HOSPITAL | Age: 63
DRG: 616 | End: 2023-10-19
Payer: COMMERCIAL

## 2023-10-19 ENCOUNTER — HOME CARE VISIT (OUTPATIENT)
Dept: HOME HEALTH SERVICES | Facility: HOME HEALTHCARE | Age: 63
End: 2023-10-19
Payer: COMMERCIAL

## 2023-10-19 ENCOUNTER — APPOINTMENT (EMERGENCY)
Dept: RADIOLOGY | Facility: HOSPITAL | Age: 63
DRG: 616 | End: 2023-10-19
Payer: COMMERCIAL

## 2023-10-19 ENCOUNTER — APPOINTMENT (INPATIENT)
Dept: NON INVASIVE DIAGNOSTICS | Facility: HOSPITAL | Age: 63
DRG: 616 | End: 2023-10-19
Payer: COMMERCIAL

## 2023-10-19 ENCOUNTER — HOSPITAL ENCOUNTER (INPATIENT)
Facility: HOSPITAL | Age: 63
LOS: 13 days | Discharge: NURSING, REHAB OR LONG-TERM CARE | DRG: 616 | End: 2023-11-01
Attending: EMERGENCY MEDICINE | Admitting: INTERNAL MEDICINE
Payer: COMMERCIAL

## 2023-10-19 DIAGNOSIS — Z01.818 PRE-OP EXAM: ICD-10-CM

## 2023-10-19 DIAGNOSIS — R79.89 ELEVATED TROPONIN: ICD-10-CM

## 2023-10-19 DIAGNOSIS — E11.621 DIABETIC ULCER OF RIGHT HEEL ASSOCIATED WITH TYPE 2 DIABETES MELLITUS, WITH FAT LAYER EXPOSED (HCC): ICD-10-CM

## 2023-10-19 DIAGNOSIS — L97.412 DIABETIC ULCER OF RIGHT HEEL ASSOCIATED WITH TYPE 2 DIABETES MELLITUS, WITH FAT LAYER EXPOSED (HCC): ICD-10-CM

## 2023-10-19 DIAGNOSIS — I96 GANGRENE (HCC): ICD-10-CM

## 2023-10-19 DIAGNOSIS — Z89.611 S/P AKA (ABOVE KNEE AMPUTATION), RIGHT (HCC): Primary | ICD-10-CM

## 2023-10-19 PROBLEM — R06.89 RESPIRATORY INSUFFICIENCY: Status: ACTIVE | Noted: 2023-10-19

## 2023-10-19 LAB
2HR DELTA HS TROPONIN: 151 NG/L
2HR DELTA HS TROPONIN: 528 NG/L
4HR DELTA HS TROPONIN: 415 NG/L
4HR DELTA HS TROPONIN: 687 NG/L
ABO GROUP BLD: NORMAL
ALBUMIN SERPL BCP-MCNC: 3.5 G/DL (ref 3.5–5)
ALP SERPL-CCNC: 69 U/L (ref 34–104)
ALT SERPL W P-5'-P-CCNC: 13 U/L (ref 7–52)
ANION GAP SERPL CALCULATED.3IONS-SCNC: 8 MMOL/L
APTT PPP: 22 SECONDS (ref 23–37)
AST SERPL W P-5'-P-CCNC: 17 U/L (ref 13–39)
ATRIAL RATE: 101 BPM
ATRIAL RATE: 105 BPM
BACTERIA UR QL AUTO: ABNORMAL /HPF
BASOPHILS # BLD MANUAL: 0 THOUSAND/UL (ref 0–0.1)
BASOPHILS NFR MAR MANUAL: 0 % (ref 0–1)
BILIRUB SERPL-MCNC: 0.58 MG/DL (ref 0.2–1)
BILIRUB UR QL STRIP: NEGATIVE
BLD GP AB SCN SERPL QL: NEGATIVE
BNP SERPL-MCNC: 63 PG/ML (ref 0–100)
BUN SERPL-MCNC: 16 MG/DL (ref 5–25)
CALCIUM SERPL-MCNC: 8.9 MG/DL (ref 8.4–10.2)
CARDIAC TROPONIN I PNL SERPL HS: 1182 NG/L
CARDIAC TROPONIN I PNL SERPL HS: 1295 NG/L
CARDIAC TROPONIN I PNL SERPL HS: 249 NG/L
CARDIAC TROPONIN I PNL SERPL HS: 767 NG/L
CARDIAC TROPONIN I PNL SERPL HS: 785 NG/L
CARDIAC TROPONIN I PNL SERPL HS: 98 NG/L
CHLORIDE SERPL-SCNC: 94 MMOL/L (ref 96–108)
CLARITY UR: CLEAR
CO2 SERPL-SCNC: 28 MMOL/L (ref 21–32)
COLOR UR: YELLOW
CREAT SERPL-MCNC: 0.95 MG/DL (ref 0.6–1.3)
CRP SERPL QL: 108 MG/L
EOSINOPHIL # BLD MANUAL: 0 THOUSAND/UL (ref 0–0.4)
EOSINOPHIL NFR BLD MANUAL: 0 % (ref 0–6)
ERYTHROCYTE [DISTWIDTH] IN BLOOD BY AUTOMATED COUNT: 14.5 % (ref 11.6–15.1)
ERYTHROCYTE [SEDIMENTATION RATE] IN BLOOD: 81 MM/HOUR (ref 0–19)
FLUAV RNA RESP QL NAA+PROBE: NEGATIVE
FLUBV RNA RESP QL NAA+PROBE: NEGATIVE
GFR SERPL CREATININE-BSD FRML MDRD: 84 ML/MIN/1.73SQ M
GLUCOSE SERPL-MCNC: 146 MG/DL (ref 65–140)
GLUCOSE SERPL-MCNC: 147 MG/DL (ref 65–140)
GLUCOSE SERPL-MCNC: 96 MG/DL (ref 65–140)
GLUCOSE UR STRIP-MCNC: NEGATIVE MG/DL
HCT VFR BLD AUTO: 40.3 % (ref 36.5–49.3)
HGB BLD-MCNC: 12.5 G/DL (ref 12–17)
HGB UR QL STRIP.AUTO: NEGATIVE
INR PPP: 1.08 (ref 0.84–1.19)
KETONES UR STRIP-MCNC: NEGATIVE MG/DL
LACTATE SERPL-SCNC: 1.3 MMOL/L (ref 0.5–2)
LACTATE SERPL-SCNC: 1.8 MMOL/L (ref 0.5–2)
LEUKOCYTE ESTERASE UR QL STRIP: NEGATIVE
LG PLATELETS BLD QL SMEAR: PRESENT
LIPASE SERPL-CCNC: 9 U/L (ref 11–82)
LYMPHOCYTES # BLD AUTO: 0.26 THOUSAND/UL (ref 0.6–4.47)
LYMPHOCYTES # BLD AUTO: 2 % (ref 14–44)
MCH RBC QN AUTO: 26.5 PG (ref 26.8–34.3)
MCHC RBC AUTO-ENTMCNC: 31 G/DL (ref 31.4–37.4)
MCV RBC AUTO: 85 FL (ref 82–98)
MONOCYTES # BLD AUTO: 0.38 THOUSAND/UL (ref 0–1.22)
MONOCYTES NFR BLD: 3 % (ref 4–12)
MUCOUS THREADS UR QL AUTO: ABNORMAL
NEUTROPHILS # BLD MANUAL: 12.13 THOUSAND/UL (ref 1.85–7.62)
NEUTS BAND NFR BLD MANUAL: 3 % (ref 0–8)
NEUTS SEG NFR BLD AUTO: 92 % (ref 43–75)
NITRITE UR QL STRIP: NEGATIVE
NON-SQ EPI CELLS URNS QL MICRO: ABNORMAL /HPF
OVALOCYTES BLD QL SMEAR: PRESENT
P AXIS: 71 DEGREES
P AXIS: 75 DEGREES
PH UR STRIP.AUTO: 7.5 [PH]
PLATELET # BLD AUTO: 301 THOUSANDS/UL (ref 149–390)
PLATELET # BLD AUTO: 348 THOUSANDS/UL (ref 149–390)
PLATELET BLD QL SMEAR: ADEQUATE
PMV BLD AUTO: 9.1 FL (ref 8.9–12.7)
PMV BLD AUTO: 9.3 FL (ref 8.9–12.7)
POTASSIUM SERPL-SCNC: 4.8 MMOL/L (ref 3.5–5.3)
PR INTERVAL: 172 MS
PR INTERVAL: 178 MS
PROCALCITONIN SERPL-MCNC: 0.08 NG/ML
PROCALCITONIN SERPL-MCNC: 0.11 NG/ML
PROT SERPL-MCNC: 8.3 G/DL (ref 6.4–8.4)
PROT UR STRIP-MCNC: ABNORMAL MG/DL
PROTHROMBIN TIME: 14 SECONDS (ref 11.6–14.5)
QRS AXIS: -15 DEGREES
QRS AXIS: -9 DEGREES
QRSD INTERVAL: 100 MS
QRSD INTERVAL: 96 MS
QT INTERVAL: 330 MS
QT INTERVAL: 344 MS
QTC INTERVAL: 436 MS
QTC INTERVAL: 446 MS
RBC # BLD AUTO: 4.72 MILLION/UL (ref 3.88–5.62)
RBC #/AREA URNS AUTO: ABNORMAL /HPF
RBC MORPH BLD: PRESENT
RH BLD: POSITIVE
RSV RNA RESP QL NAA+PROBE: NEGATIVE
SARS-COV-2 RNA RESP QL NAA+PROBE: NEGATIVE
SODIUM SERPL-SCNC: 130 MMOL/L (ref 135–147)
SP GR UR STRIP.AUTO: 1.02 (ref 1–1.03)
SPECIMEN EXPIRATION DATE: NORMAL
T WAVE AXIS: 81 DEGREES
T WAVE AXIS: 96 DEGREES
UROBILINOGEN UR STRIP-ACNC: 4 MG/DL
VENTRICULAR RATE: 101 BPM
VENTRICULAR RATE: 105 BPM
WBC # BLD AUTO: 12.77 THOUSAND/UL (ref 4.31–10.16)
WBC #/AREA URNS AUTO: ABNORMAL /HPF

## 2023-10-19 PROCEDURE — 87040 BLOOD CULTURE FOR BACTERIA: CPT | Performed by: EMERGENCY MEDICINE

## 2023-10-19 PROCEDURE — 96365 THER/PROPH/DIAG IV INF INIT: CPT

## 2023-10-19 PROCEDURE — 86900 BLOOD TYPING SEROLOGIC ABO: CPT

## 2023-10-19 PROCEDURE — 85049 AUTOMATED PLATELET COUNT: CPT | Performed by: INTERNAL MEDICINE

## 2023-10-19 PROCEDURE — 93010 ELECTROCARDIOGRAM REPORT: CPT | Performed by: INTERNAL MEDICINE

## 2023-10-19 PROCEDURE — 71046 X-RAY EXAM CHEST 2 VIEWS: CPT

## 2023-10-19 PROCEDURE — 85007 BL SMEAR W/DIFF WBC COUNT: CPT | Performed by: EMERGENCY MEDICINE

## 2023-10-19 PROCEDURE — 73630 X-RAY EXAM OF FOOT: CPT

## 2023-10-19 PROCEDURE — 73700 CT LOWER EXTREMITY W/O DYE: CPT

## 2023-10-19 PROCEDURE — 85027 COMPLETE CBC AUTOMATED: CPT | Performed by: EMERGENCY MEDICINE

## 2023-10-19 PROCEDURE — 96367 TX/PROPH/DG ADDL SEQ IV INF: CPT

## 2023-10-19 PROCEDURE — 86140 C-REACTIVE PROTEIN: CPT | Performed by: EMERGENCY MEDICINE

## 2023-10-19 PROCEDURE — 83880 ASSAY OF NATRIURETIC PEPTIDE: CPT | Performed by: EMERGENCY MEDICINE

## 2023-10-19 PROCEDURE — 36415 COLL VENOUS BLD VENIPUNCTURE: CPT | Performed by: EMERGENCY MEDICINE

## 2023-10-19 PROCEDURE — 87186 SC STD MICRODIL/AGAR DIL: CPT | Performed by: EMERGENCY MEDICINE

## 2023-10-19 PROCEDURE — 87077 CULTURE AEROBIC IDENTIFY: CPT | Performed by: EMERGENCY MEDICINE

## 2023-10-19 PROCEDURE — 96366 THER/PROPH/DIAG IV INF ADDON: CPT

## 2023-10-19 PROCEDURE — 83605 ASSAY OF LACTIC ACID: CPT | Performed by: INTERNAL MEDICINE

## 2023-10-19 PROCEDURE — G1004 CDSM NDSC: HCPCS

## 2023-10-19 PROCEDURE — 87181 SC STD AGAR DILUTION PER AGT: CPT | Performed by: EMERGENCY MEDICINE

## 2023-10-19 PROCEDURE — 84484 ASSAY OF TROPONIN QUANT: CPT | Performed by: INTERNAL MEDICINE

## 2023-10-19 PROCEDURE — 83605 ASSAY OF LACTIC ACID: CPT | Performed by: EMERGENCY MEDICINE

## 2023-10-19 PROCEDURE — 82948 REAGENT STRIP/BLOOD GLUCOSE: CPT

## 2023-10-19 PROCEDURE — 85610 PROTHROMBIN TIME: CPT | Performed by: EMERGENCY MEDICINE

## 2023-10-19 PROCEDURE — 81001 URINALYSIS AUTO W/SCOPE: CPT | Performed by: EMERGENCY MEDICINE

## 2023-10-19 PROCEDURE — 87154 CUL TYP ID BLD PTHGN 6+ TRGT: CPT | Performed by: EMERGENCY MEDICINE

## 2023-10-19 PROCEDURE — 99285 EMERGENCY DEPT VISIT HI MDM: CPT | Performed by: PODIATRIST

## 2023-10-19 PROCEDURE — 86850 RBC ANTIBODY SCREEN: CPT

## 2023-10-19 PROCEDURE — 87185 SC STD ENZYME DETCJ PER NZM: CPT | Performed by: EMERGENCY MEDICINE

## 2023-10-19 PROCEDURE — 99285 EMERGENCY DEPT VISIT HI MDM: CPT

## 2023-10-19 PROCEDURE — 86901 BLOOD TYPING SEROLOGIC RH(D): CPT

## 2023-10-19 PROCEDURE — 87076 CULTURE ANAEROBE IDENT EACH: CPT | Performed by: EMERGENCY MEDICINE

## 2023-10-19 PROCEDURE — 84145 PROCALCITONIN (PCT): CPT | Performed by: INTERNAL MEDICINE

## 2023-10-19 PROCEDURE — 93005 ELECTROCARDIOGRAM TRACING: CPT

## 2023-10-19 PROCEDURE — 80053 COMPREHEN METABOLIC PANEL: CPT | Performed by: EMERGENCY MEDICINE

## 2023-10-19 PROCEDURE — 84484 ASSAY OF TROPONIN QUANT: CPT | Performed by: EMERGENCY MEDICINE

## 2023-10-19 PROCEDURE — 83690 ASSAY OF LIPASE: CPT | Performed by: EMERGENCY MEDICINE

## 2023-10-19 PROCEDURE — 85652 RBC SED RATE AUTOMATED: CPT | Performed by: EMERGENCY MEDICINE

## 2023-10-19 PROCEDURE — 0241U HB NFCT DS VIR RESP RNA 4 TRGT: CPT | Performed by: EMERGENCY MEDICINE

## 2023-10-19 PROCEDURE — 84145 PROCALCITONIN (PCT): CPT | Performed by: EMERGENCY MEDICINE

## 2023-10-19 PROCEDURE — 85730 THROMBOPLASTIN TIME PARTIAL: CPT | Performed by: EMERGENCY MEDICINE

## 2023-10-19 PROCEDURE — 99285 EMERGENCY DEPT VISIT HI MDM: CPT | Performed by: EMERGENCY MEDICINE

## 2023-10-19 PROCEDURE — 99223 1ST HOSP IP/OBS HIGH 75: CPT | Performed by: INTERNAL MEDICINE

## 2023-10-19 RX ORDER — PANTOPRAZOLE SODIUM 40 MG/1
40 TABLET, DELAYED RELEASE ORAL
Status: DISCONTINUED | OUTPATIENT
Start: 2023-10-20 | End: 2023-11-01 | Stop reason: HOSPADM

## 2023-10-19 RX ORDER — FUROSEMIDE 10 MG/ML
40 INJECTION INTRAMUSCULAR; INTRAVENOUS
Status: DISCONTINUED | OUTPATIENT
Start: 2023-10-20 | End: 2023-10-23

## 2023-10-19 RX ORDER — CEFEPIME HYDROCHLORIDE 2 G/50ML
2000 INJECTION, SOLUTION INTRAVENOUS ONCE
Status: COMPLETED | OUTPATIENT
Start: 2023-10-19 | End: 2023-10-19

## 2023-10-19 RX ORDER — INSULIN GLARGINE 100 [IU]/ML
30 INJECTION, SOLUTION SUBCUTANEOUS 2 TIMES DAILY
Status: DISCONTINUED | OUTPATIENT
Start: 2023-10-19 | End: 2023-10-23

## 2023-10-19 RX ORDER — CEFEPIME HYDROCHLORIDE 2 G/50ML
2000 INJECTION, SOLUTION INTRAVENOUS EVERY 12 HOURS
Status: DISCONTINUED | OUTPATIENT
Start: 2023-10-20 | End: 2023-10-20

## 2023-10-19 RX ORDER — FINASTERIDE 5 MG/1
5 TABLET, FILM COATED ORAL DAILY
Status: DISCONTINUED | OUTPATIENT
Start: 2023-10-20 | End: 2023-11-01 | Stop reason: HOSPADM

## 2023-10-19 RX ORDER — ONDANSETRON 2 MG/ML
4 INJECTION INTRAMUSCULAR; INTRAVENOUS EVERY 4 HOURS PRN
Status: DISCONTINUED | OUTPATIENT
Start: 2023-10-19 | End: 2023-10-26

## 2023-10-19 RX ORDER — TAMSULOSIN HYDROCHLORIDE 0.4 MG/1
0.4 CAPSULE ORAL
Status: DISCONTINUED | OUTPATIENT
Start: 2023-10-19 | End: 2023-11-01 | Stop reason: HOSPADM

## 2023-10-19 RX ORDER — ENOXAPARIN SODIUM 100 MG/ML
40 INJECTION SUBCUTANEOUS DAILY
Status: DISCONTINUED | OUTPATIENT
Start: 2023-10-20 | End: 2023-11-01 | Stop reason: HOSPADM

## 2023-10-19 RX ORDER — VANCOMYCIN HYDROCHLORIDE 1 G/200ML
1000 INJECTION, SOLUTION INTRAVENOUS EVERY 12 HOURS
Status: DISCONTINUED | OUTPATIENT
Start: 2023-10-20 | End: 2023-10-20

## 2023-10-19 RX ORDER — ATORVASTATIN CALCIUM 80 MG/1
80 TABLET, FILM COATED ORAL
Status: DISCONTINUED | OUTPATIENT
Start: 2023-10-19 | End: 2023-11-01 | Stop reason: HOSPADM

## 2023-10-19 RX ORDER — LORAZEPAM 2 MG/ML
0.5 INJECTION INTRAMUSCULAR ONCE
Status: COMPLETED | OUTPATIENT
Start: 2023-10-19 | End: 2023-10-19

## 2023-10-19 RX ORDER — CLOPIDOGREL BISULFATE 75 MG/1
75 TABLET ORAL DAILY
Status: DISCONTINUED | OUTPATIENT
Start: 2023-10-20 | End: 2023-11-01 | Stop reason: HOSPADM

## 2023-10-19 RX ORDER — FUROSEMIDE 40 MG/1
40 TABLET ORAL 2 TIMES DAILY
Status: DISCONTINUED | OUTPATIENT
Start: 2023-10-19 | End: 2023-10-19

## 2023-10-19 RX ORDER — ACETAMINOPHEN 325 MG/1
650 TABLET ORAL EVERY 6 HOURS PRN
Status: DISCONTINUED | OUTPATIENT
Start: 2023-10-19 | End: 2023-11-01 | Stop reason: HOSPADM

## 2023-10-19 RX ADMIN — ATORVASTATIN CALCIUM 80 MG: 80 TABLET, FILM COATED ORAL at 18:35

## 2023-10-19 RX ADMIN — LORAZEPAM 0.5 MG: 2 INJECTION INTRAMUSCULAR; INTRAVENOUS at 17:08

## 2023-10-19 RX ADMIN — SODIUM CHLORIDE 500 ML: 0.9 INJECTION, SOLUTION INTRAVENOUS at 13:16

## 2023-10-19 RX ADMIN — TAMSULOSIN HYDROCHLORIDE 0.4 MG: 0.4 CAPSULE ORAL at 18:35

## 2023-10-19 RX ADMIN — VANCOMYCIN HYDROCHLORIDE 1750 MG: 1 INJECTION, POWDER, LYOPHILIZED, FOR SOLUTION INTRAVENOUS at 13:52

## 2023-10-19 RX ADMIN — CEFEPIME HYDROCHLORIDE 2000 MG: 2 INJECTION, SOLUTION INTRAVENOUS at 13:17

## 2023-10-19 RX ADMIN — FUROSEMIDE 40 MG: 40 TABLET ORAL at 18:35

## 2023-10-19 NOTE — H&P
[facility]  H&P  Name: Jane Lara 58 y.o. male I MRN: [id number]  Unit/Bed#: E4 -01 I Date of Admission: 10/19/2023   Date of Service: 10/19/2023 I Hospital Day: 0      Assessment/Plan   * Diabetic ulcer of right heel [facility])  Assessment & Plan  This is a 57-year-old male with history of diabetes mellitus, PAD, chronic right heel ulcer, history of left BKA presenting with 2-day history of weakness, chills, nausea and nonbloody nonbilious vomiting. Patient denies any fevers, chest pain, ablations, dyspnea. Denies any abdominal pain. Patient was most recently admitted 8/29 - 9/2 for right heel diabetic ulcer patient was treated with IV antibiotics and discharged on oral antibiotics to short-term rehab at [facility].     Right foot x-ray reveals large ulcer overlying plantar aspect of calcaneus  CT right lower extremity reveals severe diffuse subcutaneous edema without subcutaneous gas, no apparent discrete collection no signs of osteomyelitis  Podiatry input appreciated  Stating limb salvageability is unlikely and stating patient will likely need amputation of limb  vascular surgery input appreciated  Repeat LEADS  Patient will likely require BKA versus AKA  Continue vancomycin and cefepime, pharmacy consultation will be appreciated to assist in vancomycin dosing  Continue IV diuresis  Follow-up cultures  Cardiology consulted for preoperative risk assessment      Acute on chronic heart failure with preserved ejection fraction (HCC)  Assessment & Plan  Wt Readings from Last 3 Encounters:   10/19/23 116 kg (255 lb 8.2 oz)   09/02/23 118 kg (259 lb 7.7 oz)   07/31/23 118 kg (260 lb)     Most recent echocardiogram from 2/2020 with ejection fraction normal, grade 1 diastolic dysfunction  Patient with lower extremity edema of right leg, will start on furosemide 40 mg IV twice daily for volume control  Monitor I&O, daily weight, fluid restriction    Elevated troponin  Assessment & Plan  Differentials include rule out ACS versus secondary to tachycardia/SIRS/sepsis  High-sensitivity troponin -[id number]  No acute EKG changes  Patient denies any chest pain  Continue to trend troponin, monitor on telemetry  Continue aspirin, Plavix, statin  Cardiology input will be appreciated  2D echo    Respiratory insufficiency  Assessment & Plan  Acute hypoxic respiratory distress possibly secondary to atelectasis from generalized weakness and pain  Currently on 2 L oxygen  Chest x-ray negative for any acute cardiopulmonary disease  Wean oxygen as tolerated    S/P CABG x 3  Assessment & Plan  History of CAD status post CABG  Continue on aspirin, Plavix, statin    Diabetic gastroparesis   Assessment & Plan  Continue PPI, Zofran as needed  Small frequent meals    Orthostatic hypotension  Assessment & Plan  Was previously prescribed midodrine however only takes this as needed    S/P BKA (below knee amputation), left (HCC)  Assessment & Plan  With prosthesis in place    PAD (peripheral artery disease) ([address])  Assessment & Plan  Patient follows with vascular surgery outpatient  Continue aspirin, Plavix, statin  Vascular surgery input will be appreciated    Hyperlipidemia  Assessment & Plan  Continue statin    Type 2 diabetes mellitus with diabetic neuropathy, with long-term current use of insulin ([address])  Assessment & Plan  Lab Results   Component Value Date    HGBA1C 6.7 (A) 02/02/2023       Recent Labs     10/19/23  1303   POCGLU 146*     At home patient is maintained on Lantus 40 units twice daily, will restart at lower dose of Lantus 30 units twice daily to avoid hypoglycemia  Monitor Accu-Cheks, sliding scale for coverage             VTE Prophylaxis: Enoxaparin (Lovenox)  / sequential compression device   Code Status: Full  POLST: There is no POLST form on file for this patient (pre-hospital)    Anticipated Length of Stay:  Patient will be admitted on an Inpatient basis with an anticipated length of stay of greater than 2 midnights. Justification for Hospital Stay: right diabetic heel ulcer    Total Time for Visit, including Counseling / Coordination of Care:Greater than 50% of this total time spent on direct patient counseling and coordination of care. Chief Complaint:   weakness, chills    History of Present Illness:    David Villeda is a 58 y.o. male who presents with weakness, chills. Patient has history of diabetes mellitus, PAD, chronic right heel ulcer, history of left BKA presenting with 2-day history of weakness, chills, nausea and nonbloody nonbilious vomiting. Patient denies any fevers, chest pain, ablations, dyspnea. Denies any abdominal pain. Patient was most recently admitted 8/29 - 9/2 for right heel diabetic ulcer patient was treated with IV antibiotics and discharged on oral antibiotics to short-term rehab at [facility]. Review of Systems:    Review of Systems   Constitutional:  Positive for chills. HENT: Negative. Eyes: Negative. Respiratory: Negative. Cardiovascular: Negative. Gastrointestinal:  Positive for nausea and vomiting. Endocrine: Negative. Genitourinary: Negative. Musculoskeletal: Negative. Skin:  Positive for rash and wound. Allergic/Immunologic: Negative. Neurological:  Positive for weakness. Hematological: Negative. Psychiatric/Behavioral: Negative.          Past Medical and Surgical History:     Past Medical History:   Diagnosis Date    Amputated below knee, left ([address]) 10/4/2018    Anxiety     Anxiety and depression     Cataract     Congestive cardiac failure ([address])     Coronary artery disease     Depression     Diabetes mellitus ([address])     Diabetic autonomic neuropathy associated with type 2 diabetes mellitus ([address])     Last Assessed: 12/28/2017    History of DVT (deep vein thrombosis)     left LE    Hyperlipidemia     Lymphedema of right lower extremity     Obesity     Orthostatic hypotension        Past Surgical History:   Procedure Laterality Date    CORONARY ARTERY BYPASS GRAFT      EYE SURGERY      cataracts bilateral    LEG AMPUTATION THROUGH LOWER TIBIA AND FIBULA Left 8/3/2018    Procedure: AMPUTATION BELOW KNEE (BKA) L BKA;  Surgeon: Iman Farias MD;  Location: [facility];  Service: Vascular    [place] CORONARY ARTERY BYP W/VEIN & ARTERY GRAFT 4 VEIN N/A 3/28/2018    Procedure: CORONARY ARTERY BYPASS GRAFT (CABG) x3 VESSELS, LIMA TO LAD, SVG--> PDA, SVG--> OM2,  RIGHT LEG EVH/SVH TO , INTRA-OP AMANDEEP;  Surgeon: Etienne Dubon MD;  Location: [facility];  Service: Cardiac Surgery    ROTATOR CUFF REPAIR Bilateral        Meds/Allergies:    Prior to Admission medications    Medication Sig Start Date End Date Taking? Authorizing Provider   aspirin (ECOTRIN LOW STRENGTH) 81 mg EC tablet Take 81 mg by mouth daily   Yes Historical Provider, MD   atorvastatin (LIPITOR) 80 mg tablet Take 1 tablet (80 mg total) by mouth daily 2/2/23  Yes Cynda Schaumann, DO   cholecalciferol (VITAMIN D3) 1,000 units tablet Take 1,000 Units by mouth daily   Yes Historical Provider, MD   clopidogrel (PLAVIX) 75 mg tablet TAKE 1 TABLET BY MOUTH DAILY 6/19/23  Yes Cynda Schaumann, DO   finasteride (PROSCAR) 5 mg tablet TAKE 1 TABLET BY MOUTH DAILY 8/31/23  Yes Cynda Schaumann, DO   furosemide (LASIX) 40 mg tablet TAKE 1 TABLET BY MOUTH TWICE  DAILY 5/30/23  Yes Waleska Patel MD   glucose blood test strip ReliOn Prime Meter   Yes Historical Provider, MD   Insulin Glargine Solostar (Lantus SoloStar) 100 UNIT/ML SOPN Inject 0.4 mL (40 Units total) under the skin 2 (two) times a day This is dosing for rehab on discharge 9/2/23  Yes Celia Smith PA-C   metoclopramide (REGLAN) 5 mg tablet Take 1 tablet (5 mg total) by mouth daily 2/2/23  Yes Cynda Schaumann, DO   midodrine (PROAMATINE) 2.5 mg tablet Take 2.5 mg by mouth 3 (three) times a day before meals PT STATED HE IS NOT TAKING THIS AND DOES NOT WANT TO.     Yes Historical Provider, MD   pantoprazole (PROTONIX) 40 mg tablet TAKE 1 TABLET BY MOUTH DAILY IN  THE EARLY MORNING 23  Yes Dax Landaverde DO   tamsulosin [facility]) 0.4 mg Take 1 capsule (0.4 mg total) by mouth daily with dinner 23  Yes Dax Landaverde DO   ammonium lactate (LAC-HYDRIN) 12 % lotion Apply 1 Application topically in the morning  Patient not taking: Reported on 10/19/2023 6/27/23   Renetta Nguyễn DPM   sodium hypochlorite (DAKIN'S QUARTER-STRENGTH) Irrigate with 1 Application as directed daily  Patient not taking: Reported on 10/19/2023 7/31/23   Dileep Haider DPM     I have reviewed home medications with patient personally. Allergies: No Known Allergies    Social History:     Social History     Substance and Sexual Activity   Alcohol Use Never    Comment: rarely     Social History     Tobacco Use   Smoking Status Former    Packs/day: 1.00    Years: 12.00    Total pack years: 12.00    Types: Cigarettes    Quit date: 3/23/1994    Years since quittin.5   Smokeless Tobacco Never     Social History     Substance and Sexual Activity   Drug Use No       Family History:    Family History   Problem Relation Age of Onset    Atrial fibrillation Mother     Stroke Mother     Hypertension Father     Heart attack Paternal Grandfather 61    Diabetes Maternal Grandmother     Diabetes Maternal Grandfather     Diabetes Maternal Aunt     Diabetes Maternal Uncle        Physical Exam:     Vitals:   Blood Pressure: 134/73 (10/19/23 1819)  Pulse: 91 (10/19/23 1819)  Temperature: 99.1 °F (37.3 °C) (10/19/23 1819)  Temp Source: Temporal (10/19/23 1819)  Respirations: 22 (10/19/23 1819)  Height: 6' 1" (185.4 cm) (10/19/23 1819)  Weight - Scale: 116 kg (255 lb 8.2 oz) (10/19/23 1819)  SpO2: 96 % (10/19/23 1819)    Constitutional: Patient is oriented to person, place and time, no acute distress  HEENT:  Normocephalic, atraumatic  Cardiovascular: Normal S1S2, RRR, No murmurs/rubs/gallops appreciated.   Pulmonary:  Bilateral air entry, No rhonchi/rales/wheezing appreciated  Abdominal: Soft, Bowel sounds present, Non-tender, Non-distended  Extremities: Left BKA, right lower extremity edema  Neurological: Awake, alert  Right foot with dressing in place    Additional Data:     Lab Results: I have personally reviewed pertinent reports. Results from last 7 days   Lab Units 10/19/23  1833 10/19/23  1250   WBC Thousand/uL  --  12.77*   HEMOGLOBIN g/dL  --  12.5   HEMATOCRIT %  --  40.3   PLATELETS Thousands/uL 301 348   LYMPHO PCT %  --  2*   MONO PCT %  --  3*   EOS PCT %  --  0     Results from last 7 days   Lab Units 10/19/23  1250   POTASSIUM mmol/L 4.8   CHLORIDE mmol/L 94*   CO2 mmol/L 28   BUN mg/dL 16   CREATININE mg/dL 0.95   CALCIUM mg/dL 8.9   ALK PHOS U/L 69   ALT U/L 13   AST U/L 17     Results from last 7 days   Lab Units 10/19/23  1250   INR  1.08       Imaging: I have personally reviewed pertinent reports. CT lower extremity wo contrast right    Result Date: 10/19/2023  Narrative: CT right lower extremity without IV contrast INDICATION: ruke out gas in tissues. COMPARISON: Right foot radiographs from earlier today. MRI right ankle 8/30/2023. TECHNIQUE: CT examination of the above was performed. This examination, like all CT scans performed in the [facility], was performed utilizing techniques to minimize radiation dose exposure, including the use of iterative reconstruction and automated exposure control software. Multiplanar 2D reformatted images were created from the source data. Imaging is performed from the proximal portions of the mid tibia/fibula, not included the knee through the foot. Rad dose  581 mGy-cm FINDINGS: OSSEOUS STRUCTURES:  No fracture, dislocation or destructive osseous lesion. No signs of osteomyelitis. VISUALIZED MUSCULATURE:  Unremarkable. SOFT TISSUES: Prominent diffuse subcutaneous edema most prominent medially through the lower leg and circumferentially throughout the foot and ankle. No definite abscess on this noncontrast study.  No soft tissue gas. OTHER PERTINENT FINDINGS: Plantar heel ulcer. Diffuse atherosclerotic calcifications. Impression: Severe diffuse subcutaneous edema without subcutaneous gas. Limited evaluation for abscess without IV contrast; no apparent discrete collection. No CT signs of osteomyelitis. The study was marked in [facility] for immediate notification. Workstation performed: [id number]     XR chest 2 views    Result Date: 10/19/2023  Narrative: CHEST INDICATION:   weakness. COMPARISON: Chest radiograph dated 8/29/2021. EXAM PERFORMED/VIEWS:  XR CHEST PA & LATERAL FINDINGS: Median sternotomy wires. Cardiomediastinal silhouette is stably prominent. No focal airspace consolidation. No pneumothorax or pleural effusion. Old healed right-sided rib fractures. Impression: No acute cardiopulmonary disease. Workstation performed: [id number]     XR foot 3+ views RIGHT    Result Date: 10/19/2023  Narrative: RIGHT FOOT INDICATION:   heel ulcer. Right heel ulcer with discharge COMPARISON: MRI right ankle 8/30/2023 and right heel radiograph 8/29/2023 VIEWS:  XR FOOT 3+ VW RIGHT Images: 4 FINDINGS: There is no acute fracture or dislocation. No osseous erosion seen Mild degenerative changes of the tibiotalar joint and midfoot. Plantar and retrocalcaneal spurs. No lytic or blastic osseous lesion. Diffuse soft tissue edema. Redemonstrated ulceration along the plantar aspect of the hindfoot. Atherosclerotic changes. Impression: No acute osseous abnormality. Large ulcer overlying the plantar aspect of the calcaneus which appears slightly larger than on the prior study. Resident: Sintia Haas, the attending radiologist, have reviewed the images and agree with the final report above. Workstation performed: [id number]       [employer] / [employer] Records Reviewed: Yes     ** Please Note: This note has been constructed using a voice recognition system.  **

## 2023-10-19 NOTE — ED PROVIDER NOTES
History  Chief Complaint   Patient presents with    Weakness - Generalized     Patient reports generalized weakness that started yesterday, reports chills yesterday. Patient reports wound on L heel has gotten worse. 57 yo M h/o IDDM, lymphedema presenting for evaluation of 2 days of weakness. Pt reports decreased energy, feeling generally unwell. Dry heaving. Chronic nonhealing R heel wound/gangrene, following with podiatry    Denies CP, SOB, cough, abdominal pain, changes in stool, urinary sx            Per independent review of external records:   - 10/17 Podiatry [facility])- diabetic ulcer of R heel   "Wound is deteriorating. He has difficulty elevating and it drains heavily from his uncontrolled lymphedema. Dakins soaked gauze 24/7 to the heel. I am recommending we try HBO. HE now has local gangrene of the heel (Friend 4) and without drastic measures he is risking another BKA. Standard wound care is failing and he is unable to manage day to day basic care and hygiene."     8/30/23 MRI Mild, nonspecific plantar calcaneal marrow edema without specific findings of osteomyelitis. Prior to Admission Medications   Prescriptions Last Dose Informant Patient Reported? Taking?    Insulin Glargine Solostar (Lantus SoloStar) 100 UNIT/ML SOPN 10/18/2023  No Yes   Sig: Inject 0.4 mL (40 Units total) under the skin 2 (two) times a day This is dosing for rehab on discharge   ammonium lactate (LAC-HYDRIN) 12 % lotion Not Taking  No No   Sig: Apply 1 Application topically in the morning   Patient not taking: Reported on 10/19/2023   aspirin (ECOTRIN LOW STRENGTH) 81 mg EC tablet 10/18/2023  Yes Yes   Sig: Take 81 mg by mouth daily   atorvastatin (LIPITOR) 80 mg tablet 10/18/2023  No Yes   Sig: Take 1 tablet (80 mg total) by mouth daily   cholecalciferol (VITAMIN D3) 1,000 units tablet 10/18/2023  Yes Yes   Sig: Take 1,000 Units by mouth daily   clopidogrel (PLAVIX) 75 mg tablet 10/18/2023  No Yes   Sig: TAKE 1 TABLET BY MOUTH DAILY   finasteride (PROSCAR) 5 mg tablet 10/18/2023  No Yes   Sig: TAKE 1 TABLET BY MOUTH DAILY   furosemide (LASIX) 40 mg tablet 10/18/2023  No Yes   Sig: TAKE 1 TABLET BY MOUTH TWICE  DAILY   glucose blood test strip  Spouse/Significant Other Yes Yes   Sig: ReliOn Prime Meter   metoclopramide (REGLAN) 5 mg tablet 10/18/2023  No Yes   Sig: Take 1 tablet (5 mg total) by mouth daily   midodrine (PROAMATINE) 2.5 mg tablet 10/18/2023  Yes Yes   Sig: Take 2.5 mg by mouth 3 (three) times a day before meals PT STATED HE IS NOT TAKING THIS AND DOES NOT WANT TO.    pantoprazole (PROTONIX) 40 mg tablet 10/18/2023  No Yes   Sig: TAKE 1 TABLET BY MOUTH DAILY IN  THE EARLY MORNING   sodium hypochlorite (DAKIN'S QUARTER-STRENGTH) Not Taking  No No   Sig: Irrigate with 1 Application as directed daily   Patient not taking: Reported on 10/19/2023   tamsulosin (FLOMAX) 0.4 mg 10/18/2023  No Yes   Sig: Take 1 capsule (0.4 mg total) by mouth daily with dinner      Facility-Administered Medications: None       Past Medical History:   Diagnosis Date    Amputated below knee, left ([address]) 10/4/2018    Anxiety     Anxiety and depression     Cataract     Congestive cardiac failure ([address])     Coronary artery disease     Depression     Diabetes mellitus ([address])     Diabetic autonomic neuropathy associated with type 2 diabetes mellitus ([address])     Last Assessed: 12/28/2017    History of DVT (deep vein thrombosis)     left LE    Hyperlipidemia     Lymphedema of right lower extremity     Obesity     Orthostatic hypotension        Past Surgical History:   Procedure Laterality Date    CORONARY ARTERY BYPASS GRAFT      EYE SURGERY      cataracts bilateral    LEG AMPUTATION THROUGH LOWER TIBIA AND FIBULA Left 8/3/2018    Procedure: AMPUTATION BELOW KNEE (BKA) L BKA;  Surgeon: Obdulia Vega MD;  Location: [facility];  Service: Vascular    MI CORONARY ARTERY BYP W/VEIN & ARTERY GRAFT 4 VEIN N/A 3/28/2018    Procedure: CORONARY ARTERY BYPASS GRAFT (CABG) x3 VESSELS, LIMA TO LAD, SVG--> PDA, SVG--> OM2,  RIGHT LEG EVH/SVH TO , INTRA-OP AMANDEEP;  Surgeon: Wolfgang Freitas MD;  Location: [facility];  Service: Cardiac Surgery    ROTATOR CUFF REPAIR Bilateral        Family History   Problem Relation Age of Onset    Atrial fibrillation Mother     Stroke Mother     Hypertension Father     Heart attack Paternal Grandfather 61    Diabetes Maternal Grandmother     Diabetes Maternal Grandfather     Diabetes Maternal Aunt     Diabetes Maternal Uncle      I have reviewed and agree with the history as documented. E-Cigarette/Vaping    E-Cigarette Use Never User      E-Cigarette/Vaping Substances    Nicotine No     THC No     CBD No     Flavoring No     Other No     Unknown No      Social History     Tobacco Use    Smoking status: Former     Packs/day: 1.00     Years: 12.00     Total pack years: 12.00     Types: Cigarettes     Quit date: 3/23/1994     Years since quittin.5    Smokeless tobacco: Never   Vaping Use    Vaping Use: Never used   Substance Use Topics    Alcohol use: No     Comment: rarely    Drug use: No       Review of Systems    Physical Exam  Physical Exam  Vitals and nursing note reviewed. Constitutional:       General: He is not in acute distress. Appearance: Normal appearance. He is well-developed. HENT:      Head: Normocephalic and atraumatic. Nose: Nose normal.   Eyes:      Extraocular Movements: Extraocular movements intact. Conjunctiva/sclera: Conjunctivae normal.   Cardiovascular:      Rate and Rhythm: Normal rate and regular rhythm. Heart sounds: Normal heart sounds. Pulmonary:      Effort: Pulmonary effort is normal. No respiratory distress. Breath sounds: Normal breath sounds. No stridor. No wheezing or rales. Chest:      Chest wall: No tenderness. Abdominal:      General: There is no distension. Palpations: Abdomen is soft. Tenderness: There is no abdominal tenderness.  There is no guarding or rebound. Musculoskeletal:      Cervical back: Normal range of motion and neck supple. Right lower leg: Edema present. Comments: L BKA   Skin:     General: Skin is warm and dry. Comments: See picture below. R heel wound, gangrene, foul smelling, yellow/blue drainage on dressing, no sensation to heel/foot at baseline, normal cap refill   Neurological:      General: No focal deficit present. Mental Status: He is alert and oriented to person, place, and time. Motor: No abnormal muscle tone. Coordination: Coordination normal.   Psychiatric:         Thought Content: Thought content normal.         Judgment: Judgment normal.               Vital Signs  ED Triage Vitals [10/19/23 1242]   Temperature Pulse Respirations Blood Pressure SpO2   98.7 °F (37.1 °C) 103 (!) 28 161/70 94 %      Temp Source Heart Rate Source Patient Position - Orthostatic VS BP Location FiO2 (%)   Oral Monitor Lying Right arm --      Pain Score       3           Vitals:    10/19/23 1445 10/19/23 1500 10/19/23 1545 10/19/23 1645   BP: 146/68 145/72 146/64 152/67   Pulse: 101 98 92 90   Patient Position - Orthostatic VS:             Visual Acuity      ED Medications  Medications   cefepime (MAXIPIME) IVPB (premix in dextrose) 2,000 mg 50 mL (0 mg Intravenous Stopped 10/19/23 1347)   vancomycin (VANCOCIN) 1,750 mg in sodium chloride 0.9 % 500 mL IVPB (0 mg Intravenous Stopped 10/19/23 1630)   sodium chloride 0.9 % bolus 500 mL (0 mL Intravenous Stopped 10/19/23 1624)   LORazepam (ATIVAN) injection 0.5 mg (0.5 mg Intravenous Given 10/19/23 1708)       Diagnostic Studies  Results Reviewed       Procedure Component Value Units Date/Time    HS Troponin I 4hr [[id number]] Collected: 10/19/23 1708    Lab Status:  In process Specimen: Blood from Arm, Right Updated: 10/19/23 1712    B-Type Natriuretic Peptide(BNP) [[id number]]  (Normal) Collected: 10/19/23 1250    Lab Status: Final result Specimen: Blood from Arm, Left Updated: 10/19/23 1543     BNP 63 pg/mL     HS Troponin I 2hr [[id number]]  (Abnormal) Collected: 10/19/23 1451    Lab Status: Final result Specimen: Blood from Arm, Left Updated: 10/19/23 1522     hs TnI 2hr 249 ng/L      Delta 2hr hsTnI 151 ng/L     Urine Microscopic [[id number]]  (Abnormal) Collected: 10/19/23 1313    Lab Status: Final result Specimen: Urine, Other Updated: 10/19/23 1445     RBC, UA 4-10 /hpf      WBC, UA 1-2 /hpf      Epithelial Cells Occasional /hpf      Bacteria, UA None Seen /hpf      MUCUS THREADS Occasional    FLU/RSV/COVID - if FLU/RSV clinically relevant [[id number]]  (Normal) Collected: 10/19/23 1315    Lab Status: Final result Specimen: Nares from Nose Updated: 10/19/23 1416     SARS-CoV-2 Negative     INFLUENZA A PCR Negative     INFLUENZA B PCR Negative     RSV PCR Negative    Narrative:      FOR PEDIATRIC PATIENTS - copy/paste COVID Guidelines URL to browser: [email or web address]    SARS-CoV-2 assay is a Nucleic Acid Amplification assay intended for the  qualitative detection of nucleic acid from SARS-CoV-2 in nasopharyngeal  swabs. Results are for the presumptive identification of SARS-CoV-2 RNA. Positive results are indicative of infection with SARS-CoV-2, the virus  causing COVID-19, but do not rule out bacterial infection or co-infection  with other viruses. Laboratories within the [facility] and its  territories are required to report all positive results to the appropriate  public health authorities. Negative results do not preclude SARS-CoV-2  infection and should not be used as the sole basis for treatment or other  patient management decisions. Negative results must be combined with  clinical observations, patient history, and epidemiological information. This test has not been FDA cleared or approved. This test has been authorized by FDA under an Emergency Use Authorization  (EUA).  This test is only authorized for the duration of time the  declaration that circumstances exist justifying the authorization of the  emergency use of an in vitro diagnostic tests for detection of SARS-CoV-2  virus and/or diagnosis of COVID-19 infection under section 564(b)(1) of  the Act, 21 U. S.C. 958NOB-9(N)(1), unless the authorization is terminated  or revoked sooner. The test has been validated but independent review by FDA  and CLIA is pending. Test performed using [employer] Gene[employer]pert: This RT-PCR assay targets N2,  a region unique to SARS-CoV-2. A conserved region in the E-gene was chosen  for pan-Sarbecovirus detection which includes SARS-CoV-2. According to CMS-2020-01-R, this platform meets the definition of high-throughput technology.     Manual Differential(PHLEBS Do Not Order) [[id number]]  (Abnormal) Collected: 10/19/23 1250    Lab Status: Final result Specimen: Blood from Arm, Left Updated: 10/19/23 1409     Segmented % 92 %      Bands % 3 %      Lymphocytes % 2 %      Monocytes % 3 %      Eosinophils, % 0 %      Basophils % 0 %      Absolute Neutrophils 12.13 Thousand/uL      Lymphocytes Absolute 0.26 Thousand/uL      Monocytes Absolute 0.38 Thousand/uL      Eosinophils Absolute 0.00 Thousand/uL      Basophils Absolute 0.00 Thousand/uL      Total Counted --     RBC Morphology Present     Platelet Estimate Adequate     Large Platelet Present     Ovalocytes Present    Protime-INR [[id number]]  (Normal) Collected: 10/19/23 1250    Lab Status: Final result Specimen: Blood from Arm, Left Updated: 10/19/23 1356     Protime 14.0 seconds      INR 1.08    APTT [[id number]]  (Abnormal) Collected: 10/19/23 1250    Lab Status: Final result Specimen: Blood from Arm, Left Updated: 10/19/23 1356     PTT 22 seconds     Procalcitonin [[id number]]  (Normal) Collected: 10/19/23 1250    Lab Status: Final result Specimen: Blood from Arm, Left Updated: 10/19/23 1342     Procalcitonin 0.08 ng/ml     HS Troponin 0hr (reflex protocol) [[id number]]  (Abnormal) Collected: 10/19/23 1250    Lab Status: Final result Specimen: Blood from Arm, Left Updated: 10/19/23 1339     hs TnI 0hr 98 ng/L     Lactic acid [[id number]]  (Normal) Collected: 10/19/23 1250    Lab Status: Final result Specimen: Blood from Arm, Left Updated: 10/19/23 1333     LACTIC ACID 1.8 mmol/L     Narrative:      Result may be elevated if tourniquet was used during collection.     Comprehensive metabolic panel [[id number]]  (Abnormal) Collected: 10/19/23 1250    Lab Status: Final result Specimen: Blood from Arm, Left Updated: 10/19/23 1332     Sodium 130 mmol/L      Potassium 4.8 mmol/L      Chloride 94 mmol/L      CO2 28 mmol/L      ANION GAP 8 mmol/L      BUN 16 mg/dL      Creatinine 0.95 mg/dL      Glucose 147 mg/dL      Calcium 8.9 mg/dL      AST 17 U/L      ALT 13 U/L      Alkaline Phosphatase 69 U/L      Total Protein 8.3 g/dL      Albumin 3.5 g/dL      Total Bilirubin 0.58 mg/dL      eGFR 84 ml/min/1.73sq m     Narrative:      [place] guidelines for Chronic Kidney Disease (CKD):     Stage 1 with normal or high GFR (GFR > 90 mL/min/1.73 square meters)    Stage 2 Mild CKD (GFR = 60-89 mL/min/1.73 square meters)    Stage 3A Moderate CKD (GFR = 45-59 mL/min/1.73 square meters)    Stage 3B Moderate CKD (GFR = 30-44 mL/min/1.73 square meters)    Stage 4 Severe CKD (GFR = 15-29 mL/min/1.73 square meters)    Stage 5 End Stage CKD (GFR <15 mL/min/1.73 square meters)  Note: GFR calculation is accurate only with a steady state creatinine    Lipase [[id number]]  (Abnormal) Collected: 10/19/23 1250    Lab Status: Final result Specimen: Blood from Arm, Left Updated: 10/19/23 1332     Lipase 9 u/L     C-reactive protein [[id number]]  (Abnormal) Collected: 10/19/23 1250    Lab Status: Final result Specimen: Blood from Arm, Left Updated: 10/19/23 1332     .0 mg/L     UA w Reflex to Microscopic w Reflex to Culture [[id number]]  (Abnormal) Collected: 10/19/23 3761    Lab Status: Final result Specimen: Urine, Other Updated: 10/19/23 1332     Color, UA Yellow     Clarity, UA Clear     Specific Gravity, UA 1.018     pH, UA 7.5     Leukocytes, UA Negative     Nitrite, UA Negative     Protein, UA 70 (1+) mg/dl      Glucose, UA Negative mg/dl      Ketones, UA Negative mg/dl      Urobilinogen, UA 4.0 mg/dl      Bilirubin, UA Negative     Occult Blood, UA Negative    Sedimentation rate, automated [[id number]]  (Abnormal) Collected: 10/19/23 1250    Lab Status: Final result Specimen: Blood from Arm, Left Updated: 10/19/23 1327     Sed Rate 81 mm/hour     CBC and differential [[id number]]  (Abnormal) Collected: 10/19/23 1250    Lab Status: Final result Specimen: Blood from Arm, Left Updated: 10/19/23 1324     WBC 12.77 Thousand/uL      RBC 4.72 Million/uL      Hemoglobin 12.5 g/dL      Hematocrit 40.3 %      MCV 85 fL      MCH 26.5 pg      MCHC 31.0 g/dL      RDW 14.5 %      MPV 9.3 fL      Platelets 213 Thousands/uL     Narrative: This is an appended report. These results have been appended to a previously verified report. Blood culture #2 [[id number]] Collected: 10/19/23 1305    Lab Status: In process Specimen: Blood from Arm, Right Updated: 10/19/23 1314    Blood culture #1 [[id number]] Collected: 10/19/23 1250    Lab Status: In process Specimen: Blood from Arm, Left Updated: 10/19/23 1314    Fingerstick Glucose (POCT) [[id number]]  (Abnormal) Collected: 10/19/23 1303    Lab Status: Final result Updated: 10/19/23 1307     POC Glucose 146 mg/dl                    XR chest 2 views   ED Interpretation by Shimon Peterson DO (10/19 1535)   CHEST     INDICATION:   weakness. COMPARISON: Chest radiograph dated 8/29/2021. EXAM PERFORMED/VIEWS:  XR CHEST PA & LATERAL     FINDINGS: Median sternotomy wires. Cardiomediastinal silhouette is stably prominent. No focal airspace consolidation. No pneumothorax or pleural effusion. Old healed right-sided rib fractures.      IMPRESSION:     No acute cardiopulmonary disease. Final Result by Maria Fernanda Manzo MD (10/19 153)      No acute cardiopulmonary disease. Workstation performed: [id number]         XR foot 3+ views RIGHT   ED Interpretation by Frank Aschoff, DO (10/19 5666)   IMPRESSION:     No acute osseous abnormality. Large ulcer overlying the plantar aspect of the calcaneus which appears slightly larger than on the prior study. Final Result by Lauren Haro MD (10/19 1451)      No acute osseous abnormality. Large ulcer overlying the plantar aspect of the calcaneus which appears slightly larger than on the prior study. Resident: Lina Ross, the attending radiologist, have reviewed the images and agree with the final report above. Workstation performed: [id number]         CT lower extremity wo contrast right    (Results Pending)              Procedures  Procedures         ED Course  ED Course as of 10/19/23 1718   Thu Oct 19, 2023   1314 POC Glucose(!): 146   1332 Podiatry resident aware and will see patient when he is out of the OR   1332 Sed Rate(!): 81   1332 WBC(!): 12.77   1341 hs TnI 0hr(!): 98   1342 C-REACTIVE PROTEIN(!): 108.0   1342 Sodium(!): 130   1345 EKG independently interpreted by myself:  sinus tachycardia @ 105 bpm, no sig st changes   1450 Pt/wife updated regarding results   7434 Delta 2hr hsTnI(!): 151   1615 Pt evaluated by podiatry, recommending admission and ordered CT, recommending amputation                            Initial Sepsis Screening       Row Name 10/19/23 1310                Is the patient's history suggestive of a new or worsening infection? Yes (Proceed)  -[id number] "[employer]        Suspected source of infection suspect infection, source unknown;wound infection  -KH        Indicate SIRS criteria Tachycardia > 90 bpm;Tachypnea > 20 resp per min  -KH        Are two or more of the above signs & symptoms of infection both present and new to the patient?  Yes (Proceed)  -[id number] "[employer] Assess for evidence of organ dysfunction: Are any of the below criteria present within 6 hours of suspected infection and SIRS criteria that are NOT considered to be chronic conditions? --                  User Key  (r) = Recorded By, (t) = Taken By, (c) = Cosigned By      [facility] Name Provider Type    Louis Kim DO Physician                    SBIRT 22yo+      Flowsheet Row Most Recent Value   Initial Alcohol Screen: US AUDIT-C     1. How often do you have a drink containing alcohol? 0 Filed at: 10/19/2023 1245   2. How many drinks containing alcohol do you have on a typical day you are drinking? 0 Filed at: 10/19/2023 1245   3a. Male UNDER 65: How often do you have five or more drinks on one occasion? 0 Filed at: 10/19/2023 1245   Audit-C Score 0 Filed at: 10/19/2023 1245   TYRON: How many times in the past year have you. .. Used an illegal drug or used a prescription medication for non-medical reasons? Never Filed at: 10/19/2023 1245                      Medical Decision Making  59 yo M presenting for evaluation of generalized weakness/malaise, chills, worsening R heel gangrenous ulcer- x-ray foot to eval for gas/osteo, CXR to r/o PNA, septic workup, broad spectrum abx, UA to r/o UTI, admit    Discussed with podiatry who evaluated in ER and are planning for amputation  Admitted to [facility]     Problems Addressed:  Elevated troponin: acute illness or injury  Gangrene ([address]): acute illness or injury    Amount and/or Complexity of Data Reviewed  External Data Reviewed: labs, radiology, ECG and notes. Labs: ordered. Decision-making details documented in ED Course. Radiology: ordered and independent interpretation performed. Decision-making details documented in ED Course. ECG/medicine tests: ordered and independent interpretation performed. Decision-making details documented in ED Course.   Discussion of management or test interpretation with external provider(s): Podiatry    Risk  Prescription drug management. Decision regarding hospitalization. Disposition  Final diagnoses:   Gangrene ([address])   Diabetic ulcer of right heel associated with type 2 diabetes mellitus, with fat layer exposed ([address])   Elevated troponin     Time reflects when diagnosis was documented in both MDM as applicable and the Disposition within this note       Time User Action Codes Description Comment    10/19/2023  1:46 PM Josiephine Yani A Add [S91.301A] Non-healing open wound of right heel     10/19/2023  1:46 PM Josiephine Yani A Remove [S91.301A] Non-healing open wound of right heel     10/19/2023  1:46 PM Mimi Alejo A Add [I96] Gangrene ([address])     10/19/2023  2:36 PM Lem Meigs Add [L84.641,  L97.412] Diabetic ulcer of right heel associated with type 2 diabetes mellitus, with fat layer exposed ([address])     10/19/2023  2:55 PM Josiephine Yani A Add [R79.89] Elevated troponin           ED Disposition       ED Disposition   Admit    Condition   Stable    Date/Time   u Oct 19, 2023 7514    Comment   Case was discussed with  and the patient's admission status was agreed to be Admission Status: inpatient status to the service of Dr. Leslie Wilkerson    None         Patient's Medications   Discharge Prescriptions    No medications on file       No discharge procedures on file.     PDMP Review         Value Time User    PDMP Reviewed  Yes 4/18/2023  1:02 AM Nidia Arrieta PA-C            ED Provider  Electronically Signed by             Sheryle Sick, DO  10/19/23 2031

## 2023-10-19 NOTE — CONSULTS
[facility] Podiatry - Consultation      Patient Information:   Greta Mendoza 58 y.o. male MRN: [id number]  Unit/Bed#: ED-16 Encounter: [id number]  PCP: Johnie Weber DO  Date of Admission:  10/19/2023  Date of Consultation: 10/19/23  Requesting Physician: Alyson Calix DO      ASSESSMENT:    Greta Mendoaz is a 58 y.o. male with:    Gangrene of the right heel   Sepsis  Severe lymphedema  Type 2 diabetes  H/o left below-knee amputation    PLAN:    Worsening gangrene of the right heel over the past few months. Conservative wound care treatments has failed. now with sepsis. High likelihood of calcaneal osteomyelitis with no reconstruction options with surrounding severe lymphedema. After discussion with patient : recommending proximal amputation from Vascular team  Consult Place  Continue with IV antibiotics as ordered  Dressings with Betadine DSD  No calvin purulence could be expressed from heel: no cultures were taken  Rest of care per primary team.    Weight Bearing Status: NWB    SUBJECTIVE    History of Present Illness:    Greta Mendoza is a 58 y.o. male with past medical history significant for severe lymphedema, type 2 diabetes, below-knee amputation on the left side, PAD, is admitted for sepsis with a worsening right heel wound over the last 3 months. Patient was admitted 3 months ago and was able to heal a wound around the ankle. However he developed a heel ulceration which has progressed to gangrene over the last 3 months and has not shown any signs of improvement. Over the past 48 hours started getting sick does confirm chills, nausea, and general malaise. There is limited/no reconstruction options from podiatric standpoint with severe lymphedema and high likelihood of calcaneal osteomyelitis.   In discussion with the patient he states that he feels that this "Vianne Baldo" for a right leg is probably not worth keeping now that he is getting sick from the wound and has not seen any improvement with wound care.  After further discussion patient is amenable to proximal amputation from vascular service especially given his current sepsis diagnosis. Review of Systems:    Constitutional: Negative. HENT: Negative. Eyes: Negative. Respiratory: Negative. Cardiovascular: Negative. Gastrointestinal: Negative. Musculoskeletal: Left BKA, Right heel ulcer   Skin:ulcer with lymphedema RLE   Neurological: neuropathy   Psych: Negative.      Past Medical and Surgical History:     Past Medical History:   Diagnosis Date    Amputated below knee, left ([address]) 10/4/2018    Anxiety     Anxiety and depression     Cataract     Congestive cardiac failure ([address])     Coronary artery disease     Depression     Diabetes mellitus ([address])     Diabetic autonomic neuropathy associated with type 2 diabetes mellitus ([address])     Last Assessed: 12/28/2017    History of DVT (deep vein thrombosis)     left LE    Hyperlipidemia     Lymphedema of right lower extremity     Obesity     Orthostatic hypotension        Past Surgical History:   Procedure Laterality Date    CORONARY ARTERY BYPASS GRAFT      EYE SURGERY      cataracts bilateral    LEG AMPUTATION THROUGH LOWER TIBIA AND FIBULA Left 8/3/2018    Procedure: AMPUTATION BELOW KNEE (BKA) L BKA;  Surgeon: Maia Leahy MD;  [facility]: [facility];  Service: Vascular    [place] CORONARY ARTERY BYP W/VEIN & ARTERY GRAFT 4 VEIN N/A 3/28/2018    Procedure: CORONARY ARTERY BYPASS GRAFT (CABG) x3 VESSELS, LIMA TO LAD, SVG--> PDA, SVG--> OM2,  RIGHT LEG EVH/SVH TO , INTRA-OP AMANDEEP;  Surgeon: Allan Park MD;  Location: [facility];  Service: Cardiac Surgery    ROTATOR CUFF REPAIR Bilateral        Meds/Allergies:    (Not in a hospital admission)      No Known Allergies    Social History:     Marital Status: /Civil Union    Substance Use History:   Social History     Substance and Sexual Activity   Alcohol Use No    Comment: rarely     Social History     Tobacco Use   Smoking Status Former Packs/day: 1.00    Years: 12.00    Total pack years: 12.00    Types: Cigarettes    Quit date: 3/23/1994    Years since quittin.5   Smokeless Tobacco Never     Social History     Substance and Sexual Activity   Drug Use No       Family History:    Family History   Problem Relation Age of Onset    Atrial fibrillation Mother     Stroke Mother     Hypertension Father     Heart attack Paternal Grandfather 61    Diabetes Maternal Grandmother     Diabetes Maternal Grandfather     Diabetes Maternal Aunt     Diabetes Maternal Uncle          OBJECTIVE:    Vitals:   Blood Pressure: 145/72 (10/19/23 1500)  Pulse: 98 (10/19/23 1500)  Temperature: 98.7 °F (37.1 °C) (10/19/23 1242)  Temp Source: Oral (10/19/23 1242)  Respirations: (!) 27 (10/19/23 1500)  Weight - Scale: 120 kg (265 lb 3.4 oz) (10/19/23 1242)  SpO2: 98 % (10/19/23 1505)    Physical Exam:     General Appearance: Alert, cooperative, no distress. HEENT: Head normocephalic, atraumatic, without obvious abnormality. Heart: Normal rate and rhythm. Lungs: Non-labored breathing. No respiratory distress. Abdomen: Without distension. Psychiatric: AAOx3  Lower Extremity:  Vascular:   DP/PT pulses are dopplerable  Capillary fill is less than 3 seconds  Foot feels warm and perfused  Edema is 4+       Musculoskeletal:   Left below-knee amputation  No pain on palpation of right lower extremity    Neurological:   Completely absent protective, light touch and sharp dull discrimination      Dermatological:   Ulceration noted to the right heel measuring 5.5 x 6 x 0.8 cm. With eschar noted at the base. Negative probe to bone however calcaneal bone is very close to the surface of the wound. Slight increase in temperature surrounding heel with significant malodor and soft boggy eschar noted. Negative for calvin purulence and there is some minor clear drainage from the wound.         Clinical Images 10/19/23:      Additional Data:     Lab Results: I have personally reviewed pertinent labs including:    Results from last 7 days   Lab Units 10/19/23  1250   WBC Thousand/uL 12.77*   HEMOGLOBIN g/dL 12.5   HEMATOCRIT % 40.3   PLATELETS Thousands/uL 348   LYMPHO PCT % 2*   MONO PCT % 3*   EOS PCT % 0     Results from last 7 days   Lab Units 10/19/23  1250   POTASSIUM mmol/L 4.8   CHLORIDE mmol/L 94*   CO2 mmol/L 28   BUN mg/dL 16   CREATININE mg/dL 0.95   CALCIUM mg/dL 8.9   ALK PHOS U/L 69   ALT U/L 13   AST U/L 17     Results from last 7 days   Lab Units 10/19/23  1250   INR  1.08       Cultures: I have personally reviewed pertinent cultures including:              Imaging: I have personally reviewed pertinent films in PACS. EKG, Pathology, and Other Studies: I have personally reviewed pertinent reports. ** Please Note: Portions of the record may have been created with voice recognition software. Occasional wrong word or "sound a like" substitutions may have occurred due to the inherent limitations of voice recognition software. Read the chart carefully and recognize, using context, where substitutions have occurred.  **

## 2023-10-19 NOTE — CASE COMMUNICATION
TC recieved from pt to inform SN he was waiting for an ambulance to come and take him to the emergency dept. Pt reports he is concerned he has an infection in his wound.  Pt plans to go to [facility] ED.

## 2023-10-19 NOTE — PROGRESS NOTES
Pavithra Bowman is a 58 y.o. male who is currently ordered Vancomycin IV with management by the Pharmacy Consult service. Relevant clinical data and objective / subjective history reviewed. Vancomycin Assessment:  Indication and Goal AUC/Trough: Bone/joint infection (goal -600, trough >10)  Clinical Status:  new  Micro:   pending  Renal Function:  SCr: 0.95 mg/dL  CrCl: 106.6 mL/min  Renal replacement: Not on dialysis  Days of Therapy: 1  Current Dose: 1750 mg IV loading dose   Vancomycin Plan:  New Dosin mg IV q12h   Estimated AUC: 403 mcg*hr/mL  Estimated Trough: 13.1 mcg/mL  Next Level: 10/21 with AM labs  Renal Function Monitoring: Daily BMP and UOP  Pharmacy will continue to follow closely for s/sx of nephrotoxicity, infusion reactions and appropriateness of therapy. BMP and CBC will be ordered per protocol. We will continue to follow the patient’s culture results and clinical progress daily.     Erin López, Pharmacist

## 2023-10-19 NOTE — ASSESSMENT & PLAN NOTE
Differentials include rule out ACS versus secondary to tachycardia/SIRS/sepsis  High-sensitivity troponin -[phone]  No acute EKG changes  Patient denies any chest pain  Continue to trend troponin, monitor on telemetry  Continue aspirin, Plavix, statin  Cardiology input will be appreciated  2D echo

## 2023-10-19 NOTE — ASSESSMENT & PLAN NOTE
This is a 79-year-old male with history of diabetes mellitus, PAD, chronic right heel ulcer, history of left BKA presenting with 2-day history of weakness, chills, nausea and nonbloody nonbilious vomiting. Patient denies any fevers, chest pain, ablations, dyspnea. Denies any abdominal pain. Patient was most recently admitted 8/29 - 9/2 for right heel diabetic ulcer patient was treated with IV antibiotics and discharged on oral antibiotics to short-term rehab at [facility].     Right foot x-ray reveals large ulcer overlying plantar aspect of calcaneus  CT right lower extremity reveals severe diffuse subcutaneous edema without subcutaneous gas, no apparent discrete collection no signs of osteomyelitis  Podiatry input appreciated  Stating limb salvageability is unlikely and stating patient will likely need amputation of limb  vascular surgery input appreciated  Repeat LEADS  Patient will likely require BKA versus AKA  Continue vancomycin and cefepime, pharmacy consultation will be appreciated to assist in vancomycin dosing  Continue IV diuresis  Follow-up cultures  Cardiology consulted for preoperative risk assessment

## 2023-10-19 NOTE — ASSESSMENT & PLAN NOTE
Acute hypoxic respiratory distress possibly secondary to atelectasis from generalized weakness and pain  Currently on 2 L oxygen  Chest x-ray negative for any acute cardiopulmonary disease  Wean oxygen as tolerated

## 2023-10-19 NOTE — ASSESSMENT & PLAN NOTE
Lab Results   Component Value Date    HGBA1C 6.7 (A) 02/02/2023       Recent Labs     10/19/23  1303   POCGLU 146*     At home patient is maintained on Lantus 40 units twice daily, will restart at lower dose of Lantus 30 units twice daily to avoid hypoglycemia  Monitor Accu-Cheks, sliding scale for coverage

## 2023-10-19 NOTE — ASSESSMENT & PLAN NOTE
Patient follows with vascular surgery outpatient  Continue aspirin, Plavix, statin  Vascular surgery input will be appreciated

## 2023-10-19 NOTE — CONSULTS
Consultation - Vascular Surgery  Kasie Walker 58 y.o. male MRN: [id number]  Unit/Bed#: ED-16 Encounter: [id number]        Assessment/Plan     Assessment:  65-year-old male with nonhealing, nonsalvageable right heel wound. Vascular surgery consulted to evaluate for more proximal amputation. WBC 12.77  Hemoglobin 12.5  Lactic acid 1.8    Plan:  Obtain repeat LEADS, order placed. Patient will likely require BK vs AKA. Will f/u LEADs for final determination, however right lower extremity edema would make BKA healing capabilities more difficult  F/u CT scan RLE - on my review, no links to his suggest NSTI, no gas seen within tissues. Continue home ASA, lipitor, Plavix  Continue IV antibiotics  Recommend edema control to right lower extremity with Ace wrap, elevation  Local wound care to right heel per podiatry  Will f/u with vascular surgery attending re: offered intervention and timing pending surgeon/ OR availability  Discussed with vascular fellow  Care per primary team    History of Present Illness     HPI:  Kasie Walker is a 58 y.o. male with a past medical history significant for severe lymphedema, type 2 diabetes, previous below-knee amputation of the left lower extremity in 2018, peripheral arterial disease who presents with generalized malaise, concern for sepsis and worsening right heel wound over the last 3 months. Patient has notable heel ulceration on their right lower extremity progressing to gangrene over the last 3 months without signs of improvement. He reports chills, nausea, malaise over the the last 2 days. Patient was evaluated by podiatry in the emergency department who did not feel his right foot is salvageable at this point given high likelihood for calcaneal osteomyelitis and surrounding lymphedema. Vascular surgery consulted to evaluate for more proximal amputation. Review of Systems   Constitutional:  Positive for activity change, chills and fatigue.  Negative for appetite change, fever and unexpected weight change. Respiratory:  Positive for chest tightness and shortness of breath. Cardiovascular:  Positive for leg swelling. Negative for chest pain. Gastrointestinal:  Positive for abdominal distention. Negative for abdominal pain, nausea and vomiting. Musculoskeletal:  Positive for gait problem. Skin:  Positive for color change and wound (Right heel). Neurological:  Positive for weakness. Negative for dizziness. Psychiatric/Behavioral:  Negative for agitation, behavioral problems and confusion. All other systems reviewed and are negative.       Historical Information   Past Medical History:   Diagnosis Date    Amputated below knee, left ([address]) 10/4/2018    Anxiety     Anxiety and depression     Cataract     Congestive cardiac failure (HCC)     Coronary artery disease     Depression     Diabetes mellitus ([address])     Diabetic autonomic neuropathy associated with type 2 diabetes mellitus ([address])     Last Assessed: 12/28/2017    History of DVT (deep vein thrombosis)     left LE    Hyperlipidemia     Lymphedema of right lower extremity     Obesity     Orthostatic hypotension      Past Surgical History:   Procedure Laterality Date    CORONARY ARTERY BYPASS GRAFT      EYE SURGERY      cataracts bilateral    LEG AMPUTATION THROUGH LOWER TIBIA AND FIBULA Left 8/3/2018    Procedure: AMPUTATION BELOW KNEE (BKA) L BKA;  Surgeon: Dipti Asif MD;  [facility];  Service: Vascular    [place] CORONARY ARTERY BYP W/VEIN & ARTERY GRAFT 4 VEIN N/A 3/28/2018    Procedure: CORONARY ARTERY BYPASS GRAFT (CABG) x3 VESSELS, LIMA TO LAD, SVG--> PDA, SVG--> OM2,  RIGHT LEG EVH/SVH TO , INTRA-OP AMANDEEP;  Surgeon: Elder Ennis MD;  Location[facility];  Service: Cardiac Surgery    ROTATOR CUFF REPAIR Bilateral      Social History   Social History     Substance and Sexual Activity   Alcohol Use No    Comment: rarely     Social History     Substance and Sexual Activity   Drug Use No     Social History Tobacco Use   Smoking Status Former    Packs/day: 1.00    Years: 12.00    Total pack years: 12.00    Types: Cigarettes    Quit date: 3/23/1994    Years since quittin.5   Smokeless Tobacco Never     Family History:   Family History   Problem Relation Age of Onset    Atrial fibrillation Mother     Stroke Mother     Hypertension Father     Heart attack Paternal Grandfather 61    Diabetes Maternal Grandmother     Diabetes Maternal Grandfather     Diabetes Maternal Aunt     Diabetes Maternal Uncle        Meds/Allergies   PTA meds:   Prior to Admission Medications   Prescriptions Last Dose Informant Patient Reported? Taking?    Insulin Glargine Solostar (Lantus SoloStar) 100 UNIT/ML SOPN 10/18/2023  No Yes   Sig: Inject 0.4 mL (40 Units total) under the skin 2 (two) times a day This is dosing for rehab on discharge   ammonium lactate (LAC-HYDRIN) 12 % lotion Not Taking  No No   Sig: Apply 1 Application topically in the morning   Patient not taking: Reported on 10/19/2023   aspirin (ECOTRIN LOW STRENGTH) 81 mg EC tablet 10/18/2023  Yes Yes   Sig: Take 81 mg by mouth daily   atorvastatin (LIPITOR) 80 mg tablet 10/18/2023  No Yes   Sig: Take 1 tablet (80 mg total) by mouth daily   cholecalciferol (VITAMIN D3) 1,000 units tablet 10/18/2023  Yes Yes   Sig: Take 1,000 Units by mouth daily   clopidogrel (PLAVIX) 75 mg tablet 10/18/2023  No Yes   Sig: TAKE 1 TABLET BY MOUTH DAILY   finasteride (PROSCAR) 5 mg tablet 10/18/2023  No Yes   Sig: TAKE 1 TABLET BY MOUTH DAILY   furosemide (LASIX) 40 mg tablet 10/18/2023  No Yes   Sig: TAKE 1 TABLET BY MOUTH TWICE  DAILY   glucose blood test strip  Spouse/Significant Other Yes Yes   Sig: ReliOn Prime Meter   metoclopramide (REGLAN) 5 mg tablet 10/18/2023  No Yes   Sig: Take 1 tablet (5 mg total) by mouth daily   midodrine (PROAMATINE) 2.5 mg tablet 10/18/2023  Yes Yes   Sig: Take 2.5 mg by mouth 3 (three) times a day before meals PT STATED HE IS NOT TAKING THIS AND DOES NOT WANT TO.    pantoprazole (PROTONIX) 40 mg tablet 10/18/2023  No Yes   Sig: TAKE 1 TABLET BY MOUTH DAILY IN  THE EARLY MORNING   sodium hypochlorite (DAKIN'S QUARTER-STRENGTH) Not Taking  No No   Sig: Irrigate with 1 Application as directed daily   Patient not taking: Reported on 10/19/2023   tamsulosin (FLOMAX) 0.4 mg 10/18/2023  No Yes   Sig: Take 1 capsule (0.4 mg total) by mouth daily with dinner      Facility-Administered Medications: None     No Known Allergies    Objective   First Vitals:   Blood Pressure: 161/70 (10/19/23 1242)  Pulse: 103 (10/19/23 1242)  Temperature: 98.7 °F (37.1 °C) (10/19/23 1242)  Temp Source: Oral (10/19/23 1242)  Respirations: (!) 28 (10/19/23 1242)  Weight - Scale: 120 kg (265 lb 3.4 oz) (10/19/23 1242)  SpO2: 94 % (10/19/23 1242)    Current Vitals:   Blood Pressure: 145/62 (10/19/23 1700)  Pulse: 84 (10/19/23 1700)  Temperature: 98.7 °F (37.1 °C) (10/19/23 1242)  Temp Source: Oral (10/19/23 1242)  Respirations: 22 (10/19/23 1700)  Weight - Scale: 120 kg (265 lb 3.4 oz) (10/19/23 1242)  SpO2: 95 % (10/19/23 1700)      Intake/Output Summary (Last 24 hours) at 10/19/2023 1742  Last data filed at 10/19/2023 1630  Gross per 24 hour   Intake 1050 ml   Output 350 ml   Net 700 ml       Invasive Devices       Peripheral Intravenous Line  Duration             Peripheral IV 10/19/23 Left Antecubital <1 day    Peripheral IV 10/19/23 Right Antecubital <1 day                    Physical Exam    Lab Results: CBC:   Lab Results   Component Value Date    WBC 12.77 (H) 10/19/2023    HGB 12.5 10/19/2023    HCT 40.3 10/19/2023    MCV 85 10/19/2023     10/19/2023    RBC 4.72 10/19/2023    MCH 26.5 (L) 10/19/2023    MCHC 31.0 (L) 10/19/2023    RDW 14.5 10/19/2023    MPV 9.3 10/19/2023   , CMP:   Lab Results   Component Value Date    SODIUM 130 (L) 10/19/2023    K 4.8 10/19/2023    CL 94 (L) 10/19/2023    CO2 28 10/19/2023    BUN 16 10/19/2023    CREATININE 0.95 10/19/2023    CALCIUM 8.9 10/19/2023 AST 17 10/19/2023    ALT 13 10/19/2023    ALKPHOS 69 10/19/2023    EGFR 84 10/19/2023   , Coagulation:   Lab Results   Component Value Date    INR 1.08 10/19/2023     Imaging:   XR chest 2 views   ED Interpretation by Grey Garcia DO (10/19 2722)   CHEST     INDICATION:   weakness. COMPARISON: Chest radiograph dated 8/29/2021. EXAM PERFORMED/VIEWS:  XR CHEST PA & LATERAL     FINDINGS: Median sternotomy wires. Cardiomediastinal silhouette is stably prominent. No focal airspace consolidation. No pneumothorax or pleural effusion. Old healed right-sided rib fractures. IMPRESSION:     No acute cardiopulmonary disease. Final Result by Jann Champion MD (10/19 2609)      No acute cardiopulmonary disease. Workstation performed: [id number]         XR foot 3+ views RIGHT   ED Interpretation by Grey Garcia DO (10/19 0939)   IMPRESSION:     No acute osseous abnormality. Large ulcer overlying the plantar aspect of the calcaneus which appears slightly larger than on the prior study. Final Result by Jam Marie MD (10/19 2530)      No acute osseous abnormality. Large ulcer overlying the plantar aspect of the calcaneus which appears slightly larger than on the prior study. Resident: Evelina Aretaga, the attending radiologist, have reviewed the images and agree with the final report above. Workstation performed: [id number]         CT lower extremity wo contrast right    (Results Pending)   VAS lower limb arterial duplex, limited, unilateral    (Results Pending)       EKG, Pathology, and Other Studies: I have personally reviewed pertinent reports.       Code Status: Prior  Advance Directive and Living Will:      Power of :    POLST:

## 2023-10-19 NOTE — ASSESSMENT & PLAN NOTE
Wt Readings from Last 3 Encounters:   10/19/23 116 kg (255 lb 8.2 oz)   09/02/23 118 kg (259 lb 7.7 oz)   07/31/23 118 kg (260 lb)     Most recent echocardiogram from 2/2020 with ejection fraction normal, grade 1 diastolic dysfunction  Patient with lower extremity edema of right leg, will start on furosemide 40 mg IV twice daily for volume control  Monitor I&O, daily weight, fluid restriction

## 2023-10-19 NOTE — PLAN OF CARE

## 2023-10-20 ENCOUNTER — HOME CARE VISIT (OUTPATIENT)
Dept: HOME HEALTH SERVICES | Facility: HOME HEALTHCARE | Age: 63
End: 2023-10-20
Payer: COMMERCIAL

## 2023-10-20 ENCOUNTER — APPOINTMENT (INPATIENT)
Dept: NON INVASIVE DIAGNOSTICS | Facility: HOSPITAL | Age: 63
DRG: 616 | End: 2023-10-20
Payer: COMMERCIAL

## 2023-10-20 PROBLEM — B96.4 BACTEREMIA DUE TO PROTEUS SPECIES: Status: ACTIVE | Noted: 2023-10-20

## 2023-10-20 PROBLEM — R78.81 BACTEREMIA DUE TO PROTEUS SPECIES: Status: ACTIVE | Noted: 2023-10-20

## 2023-10-20 PROBLEM — A41.9 SEPSIS (HCC): Status: ACTIVE | Noted: 2023-10-20

## 2023-10-20 LAB
ALBUMIN SERPL BCP-MCNC: 2.9 G/DL (ref 3.5–5)
ALP SERPL-CCNC: 52 U/L (ref 34–104)
ALT SERPL W P-5'-P-CCNC: 11 U/L (ref 7–52)
ANION GAP SERPL CALCULATED.3IONS-SCNC: 6 MMOL/L
AST SERPL W P-5'-P-CCNC: 15 U/L (ref 13–39)
ATRIAL RATE: 87 BPM
BASOPHILS # BLD AUTO: 0.04 THOUSANDS/ÂΜL (ref 0–0.1)
BASOPHILS NFR BLD AUTO: 1 % (ref 0–1)
BILIRUB SERPL-MCNC: 0.57 MG/DL (ref 0.2–1)
BUN SERPL-MCNC: 13 MG/DL (ref 5–25)
CALCIUM ALBUM COR SERPL-MCNC: 9 MG/DL (ref 8.3–10.1)
CALCIUM SERPL-MCNC: 8.1 MG/DL (ref 8.4–10.2)
CHLORIDE SERPL-SCNC: 96 MMOL/L (ref 96–108)
CO2 SERPL-SCNC: 27 MMOL/L (ref 21–32)
CREAT SERPL-MCNC: 0.82 MG/DL (ref 0.6–1.3)
EOSINOPHIL # BLD AUTO: 0.03 THOUSAND/ÂΜL (ref 0–0.61)
EOSINOPHIL NFR BLD AUTO: 0 % (ref 0–6)
ERYTHROCYTE [DISTWIDTH] IN BLOOD BY AUTOMATED COUNT: 14.6 % (ref 11.6–15.1)
GFR SERPL CREATININE-BSD FRML MDRD: 93 ML/MIN/1.73SQ M
GLUCOSE SERPL-MCNC: 118 MG/DL (ref 65–140)
GLUCOSE SERPL-MCNC: 123 MG/DL (ref 65–140)
GLUCOSE SERPL-MCNC: 138 MG/DL (ref 65–140)
GLUCOSE SERPL-MCNC: 142 MG/DL (ref 65–140)
GLUCOSE SERPL-MCNC: 155 MG/DL (ref 65–140)
HCT VFR BLD AUTO: 35.8 % (ref 36.5–49.3)
HGB BLD-MCNC: 11.3 G/DL (ref 12–17)
IMM GRANULOCYTES # BLD AUTO: 0.03 THOUSAND/UL (ref 0–0.2)
IMM GRANULOCYTES NFR BLD AUTO: 0 % (ref 0–2)
LYMPHOCYTES # BLD AUTO: 0.72 THOUSANDS/ÂΜL (ref 0.6–4.47)
LYMPHOCYTES NFR BLD AUTO: 9 % (ref 14–44)
MCH RBC QN AUTO: 26.3 PG (ref 26.8–34.3)
MCHC RBC AUTO-ENTMCNC: 31.6 G/DL (ref 31.4–37.4)
MCV RBC AUTO: 83 FL (ref 82–98)
MONOCYTES # BLD AUTO: 0.46 THOUSAND/ÂΜL (ref 0.17–1.22)
MONOCYTES NFR BLD AUTO: 6 % (ref 4–12)
NEUTROPHILS # BLD AUTO: 6.84 THOUSANDS/ÂΜL (ref 1.85–7.62)
NEUTS SEG NFR BLD AUTO: 84 % (ref 43–75)
NRBC BLD AUTO-RTO: 0 /100 WBCS
P AXIS: 61 DEGREES
PLATELET # BLD AUTO: 319 THOUSANDS/UL (ref 149–390)
PMV BLD AUTO: 9.4 FL (ref 8.9–12.7)
POTASSIUM SERPL-SCNC: 4 MMOL/L (ref 3.5–5.3)
PR INTERVAL: 170 MS
PROCALCITONIN SERPL-MCNC: 0.19 NG/ML
PROT SERPL-MCNC: 6.8 G/DL (ref 6.4–8.4)
QRS AXIS: -14 DEGREES
QRSD INTERVAL: 100 MS
QT INTERVAL: 370 MS
QTC INTERVAL: 445 MS
RBC # BLD AUTO: 4.29 MILLION/UL (ref 3.88–5.62)
SODIUM SERPL-SCNC: 129 MMOL/L (ref 135–147)
T WAVE AXIS: 89 DEGREES
VENTRICULAR RATE: 87 BPM
WBC # BLD AUTO: 8.12 THOUSAND/UL (ref 4.31–10.16)

## 2023-10-20 PROCEDURE — 99223 1ST HOSP IP/OBS HIGH 75: CPT | Performed by: INTERNAL MEDICINE

## 2023-10-20 PROCEDURE — 99232 SBSQ HOSP IP/OBS MODERATE 35: CPT | Performed by: INTERNAL MEDICINE

## 2023-10-20 PROCEDURE — 80053 COMPREHEN METABOLIC PANEL: CPT | Performed by: INTERNAL MEDICINE

## 2023-10-20 PROCEDURE — NC001 PR NO CHARGE: Performed by: NURSE PRACTITIONER

## 2023-10-20 PROCEDURE — 93926 LOWER EXTREMITY STUDY: CPT

## 2023-10-20 PROCEDURE — 93922 UPR/L XTREMITY ART 2 LEVELS: CPT | Performed by: SURGERY

## 2023-10-20 PROCEDURE — 93010 ELECTROCARDIOGRAM REPORT: CPT | Performed by: INTERNAL MEDICINE

## 2023-10-20 PROCEDURE — 82948 REAGENT STRIP/BLOOD GLUCOSE: CPT

## 2023-10-20 PROCEDURE — 85025 COMPLETE CBC W/AUTO DIFF WBC: CPT | Performed by: INTERNAL MEDICINE

## 2023-10-20 PROCEDURE — 99222 1ST HOSP IP/OBS MODERATE 55: CPT

## 2023-10-20 PROCEDURE — 84145 PROCALCITONIN (PCT): CPT | Performed by: INTERNAL MEDICINE

## 2023-10-20 PROCEDURE — 99223 1ST HOSP IP/OBS HIGH 75: CPT | Performed by: SURGERY

## 2023-10-20 PROCEDURE — 93926 LOWER EXTREMITY STUDY: CPT | Performed by: SURGERY

## 2023-10-20 RX ORDER — CEFTRIAXONE 2 G/50ML
2000 INJECTION, SOLUTION INTRAVENOUS EVERY 24 HOURS
Status: COMPLETED | OUTPATIENT
Start: 2023-10-21 | End: 2023-10-26

## 2023-10-20 RX ORDER — METRONIDAZOLE 500 MG/1
500 TABLET ORAL EVERY 8 HOURS SCHEDULED
Status: DISCONTINUED | OUTPATIENT
Start: 2023-10-20 | End: 2023-10-20

## 2023-10-20 RX ORDER — METRONIDAZOLE 500 MG/100ML
500 INJECTION, SOLUTION INTRAVENOUS EVERY 8 HOURS
Status: COMPLETED | OUTPATIENT
Start: 2023-10-20 | End: 2023-10-27

## 2023-10-20 RX ORDER — INSULIN LISPRO 100 [IU]/ML
1-5 INJECTION, SOLUTION INTRAVENOUS; SUBCUTANEOUS
Status: DISCONTINUED | OUTPATIENT
Start: 2023-10-20 | End: 2023-11-01 | Stop reason: HOSPADM

## 2023-10-20 RX ADMIN — INSULIN GLARGINE 30 UNITS: 100 INJECTION, SOLUTION SUBCUTANEOUS at 21:41

## 2023-10-20 RX ADMIN — FUROSEMIDE 40 MG: 10 INJECTION, SOLUTION INTRAVENOUS at 16:53

## 2023-10-20 RX ADMIN — CLOPIDOGREL BISULFATE 75 MG: 75 TABLET ORAL at 08:49

## 2023-10-20 RX ADMIN — CEFEPIME HYDROCHLORIDE 2000 MG: 2 INJECTION, SOLUTION INTRAVENOUS at 01:03

## 2023-10-20 RX ADMIN — METRONIDAZOLE 500 MG: 500 INJECTION, SOLUTION INTRAVENOUS at 23:17

## 2023-10-20 RX ADMIN — ONDANSETRON 4 MG: 2 INJECTION INTRAMUSCULAR; INTRAVENOUS at 01:03

## 2023-10-20 RX ADMIN — ENOXAPARIN SODIUM 40 MG: 40 INJECTION SUBCUTANEOUS at 08:49

## 2023-10-20 RX ADMIN — FUROSEMIDE 40 MG: 10 INJECTION, SOLUTION INTRAVENOUS at 08:49

## 2023-10-20 RX ADMIN — CEFEPIME HYDROCHLORIDE 2000 MG: 2 INJECTION, SOLUTION INTRAVENOUS at 12:39

## 2023-10-20 RX ADMIN — FINASTERIDE 5 MG: 5 TABLET, FILM COATED ORAL at 08:49

## 2023-10-20 RX ADMIN — ONDANSETRON 4 MG: 2 INJECTION INTRAMUSCULAR; INTRAVENOUS at 08:53

## 2023-10-20 RX ADMIN — METRONIDAZOLE 500 MG: 500 INJECTION, SOLUTION INTRAVENOUS at 17:00

## 2023-10-20 RX ADMIN — INSULIN GLARGINE 30 UNITS: 100 INJECTION, SOLUTION SUBCUTANEOUS at 09:56

## 2023-10-20 RX ADMIN — ONDANSETRON 4 MG: 2 INJECTION INTRAMUSCULAR; INTRAVENOUS at 13:38

## 2023-10-20 RX ADMIN — VANCOMYCIN HYDROCHLORIDE 1000 MG: 1 INJECTION, SOLUTION INTRAVENOUS at 05:58

## 2023-10-20 RX ADMIN — ASPIRIN 81 MG: 81 TABLET, COATED ORAL at 08:49

## 2023-10-20 RX ADMIN — TAMSULOSIN HYDROCHLORIDE 0.4 MG: 0.4 CAPSULE ORAL at 16:53

## 2023-10-20 RX ADMIN — ATORVASTATIN CALCIUM 80 MG: 80 TABLET, FILM COATED ORAL at 16:53

## 2023-10-20 NOTE — NURSING NOTE
Assumed care of patient at 1500. Patient resting in bed, family in room. Patient has call bell, personal items in reach. Agree with previous RN assessment. No needs at this time.

## 2023-10-20 NOTE — ASSESSMENT & PLAN NOTE
59 yo male former smoker (quit 1994) w/ a hx of DM II, CAD S/P CABG w/ R GSV, PAD, chronic R heel ulcer, hx of L BKA presented to [facility] on 10/19/23 w/ weakness, chills, nausea and vomiting. Pt admitted w/ worsening R foot wound. R foot xray shows large ulcer overlying plantar aspect of calcaneous. CT RLE shows severe diffuse subcutaneous edema without subcutaneous gas; no OM. Pt seen in consult by podiatry for R heel wound. Per podiatry, limb salvage is unlikely and pt will need amputation of RLE. LEAD ordered to assess healing potential of BKA vs AKA (pt refused last evening). Cardiology consulted for operative risk stratification. 2-layer [place] compression drsg placed by podiatry to assist w/ RLE edema. Vascular surgery consulted re: need for RLE amputation (BKA vs AKA). Pt has a hx of PAD for which he had a L BKA by Dr. Glo Goldsmith on 8/3/18 secondary to LLE tissue loss and unsuccessful L popliteal recanalization on 3/19/18. Pt has not had a follow-up w/ vascular since 8/31/18. Pt takes ASA, plavix and lipitor at home. Diagnostics:   -10/19/23: CT RLE: severe diffuse subcutaneous edema without subcutaneous gas; no OM.   -10/19/23: xray R foot: large ulcer overlying plantar aspect of calcaneous. Labs (10/20):   WBC 8.12 (12.77)  Procalcitonin 1.19 (0.08)  BC: 2/2 (+) for gm (-) rods    Plan:  -RLE w/ worsening heel gangrene; wound non-salvageable, per podiatry  -Will need RLE amputation; BKA vs AKA  -LEADS pending to determine healing potential of BKA vs AKA (pt refused last evening; will attempt again today)  -Continue wound care per podiatry recommendations; input appreciated  -Cardiology consulted re: operative risk stratification; input appreciated   -Continue home ASA, plavix, lipitor  -Continue compression and elevation for RLE edema  -BC growing gm (-) rods; continue ABX per primary team  -Discuss w/ Dr. Kristen Flores Doctor

## 2023-10-20 NOTE — PROGRESS NOTES
[address] [place] [address]  Progress Note  Name: Last Gamboa  MRN: [id number]  Unit/Bed#: E4 -01 I Date of Admission: 10/19/2023   Date of Service: 10/20/2023 I Hospital Day: 1    Assessment/Plan   * Sepsis [facility])  Assessment & Plan  POA   Due to right heel wound with gangrene and gram negative bacteremia  See plan for individual problems below    Bacteremia due to Proteus species  Assessment & Plan  From foot wound/gangrene  ID consultation was done  Antibiotic switched to Ceftriaxone    Diabetic ulcer of right heel ([address])  Assessment & Plan  For right BKA vs AKA next week per vascular surgery  Antibiotic regimen switched to ceftriaxone and flagyl per ID  Local care and pain control    Elevated troponin  Assessment & Plan  Due to demand ischemia from sepsis  Echo pending for pre-op evaluation and audible murmur  Continue aspirin, lipitor, plavix    Type 2 diabetes mellitus with diabetic neuropathy, with long-term current use of insulin [facility])  Assessment & Plan  Lab Results   Component Value Date    HGBA1C 6.7 (A) 02/02/2023       Recent Labs     10/19/23  2106 10/20/23  0848 10/20/23  1118 10/20/23  1640   POCGLU 96 123 138 142*     Continue current regimen: Lantis 30 units BID and SSI  He is requiring less than his home regimen    Hyperlipidemia  Assessment & Plan  Continue lipitor 80 mg daily         VTE Pharmacologic Prophylaxis: VTE Score: 4 Moderate Risk (Score 3-4) - Pharmacological DVT Prophylaxis Ordered: enoxaparin (Lovenox). Patient Centered Rounds: I performed bedside rounds with nursing staff today. Discussions with Specialists or Other Care Team Provider: discussed with Dr. Elizabeth Lay  Education and Discussions with Family / Patient: Updated  (wife) at bedside.     Current Length of Stay: 1 day(s)  Current Patient Status: Inpatient   Certification Statement: The patient will continue to require additional inpatient hospital stay due to sepsis  Discharge Plan: Anticipate discharge in >72 hrs to rehab facility. Code Status: Level 1 - Full Code    Subjective:   Feels weak  Poor appetite  No fever    Objective:     Vitals:   Temp (24hrs), Av.6 °F (37 °C), Min:97.9 °F (36.6 °C), Max:99.6 °F (37.6 °C)    Temp:  [97.9 °F (36.6 °C)-99.6 °F (37.6 °C)] 98.4 °F (36.9 °C)  HR:  [83-91] 88  Resp:  [18-22] 18  BP: (116-160)/(56-73) 116/56  SpO2:  [90 %-96 %] 95 %  Body mass index is 32.78 kg/m². Input and Output Summary (last 24 hours): Intake/Output Summary (Last 24 hours) at 10/20/2023 1750  Last data filed at 10/20/2023 0900  Gross per 24 hour   Intake --   Output 900 ml   Net -900 ml       Physical Exam:   Physical Exam  Vitals reviewed. Constitutional:       Appearance: He is obese. He is ill-appearing. HENT:      Head: Normocephalic and atraumatic. Nose: No congestion or rhinorrhea. Eyes:      General: No scleral icterus. Cardiovascular:      Rate and Rhythm: Regular rhythm. Heart sounds: Murmur heard. Pulmonary:      Breath sounds: No wheezing, rhonchi or rales. Abdominal:      General: There is no distension. Palpations: Abdomen is soft. Tenderness: There is no abdominal tenderness. Musculoskeletal:         General: Deformity present. Cervical back: Neck supple. Comments: Left BKA  Right foot wound   Psychiatric:      Comments: Mood is depressed          Additional Data:     Labs:  Results from last 7 days   Lab Units 10/20/23  0505 10/19/23  1833 10/19/23  1250   WBC Thousand/uL 8.12  --  12.77*   HEMOGLOBIN g/dL 11.3*  --  12.5   HEMATOCRIT % 35.8*  --  40.3   PLATELETS Thousands/uL 319   < > 348   BANDS PCT %  --   --  3   NEUTROS PCT % 84*  --   --    LYMPHS PCT % 9*  --   --    LYMPHO PCT %  --   --  2*   MONOS PCT % 6  --   --    MONO PCT %  --   --  3*   EOS PCT % 0  --  0    < > = values in this interval not displayed.      Results from last 7 days   Lab Units 10/20/23  0505   SODIUM mmol/L 129*   POTASSIUM mmol/L 4.0 CHLORIDE mmol/L 96   CO2 mmol/L 27   BUN mg/dL 13   CREATININE mg/dL 0.82   ANION GAP mmol/L 6   CALCIUM mg/dL 8.1*   ALBUMIN g/dL 2.9*   TOTAL BILIRUBIN mg/dL 0.57   ALK PHOS U/L 52   ALT U/L 11   AST U/L 15   GLUCOSE RANDOM mg/dL 118     Results from last 7 days   Lab Units 10/19/23  1250   INR  1.08     Results from last 7 days   Lab Units 10/20/23  1640 10/20/23  1118 10/20/23  0848 10/19/23  2106 10/19/23  1303   POC GLUCOSE mg/dl 142* 138 123 96 146*         Results from last 7 days   Lab Units 10/20/23  0505 10/19/23  1833 10/19/23  1250   LACTIC ACID mmol/L  --  1.3 1.8   PROCALCITONIN ng/ml 0.19 0.11 0.08       Lines/Drains:  Invasive Devices       Peripheral Intravenous Line  Duration             Peripheral IV 10/19/23 Right Antecubital 1 day                      Telemetry:  Telemetry Orders (From admission, onward)               24 Hour Telemetry Monitoring  Continuous x 24 Hours (Telem)        Expiring   Question:  Reason for 24 Hour Telemetry  Answer:  Decompensated CHF- and any one of the following: continuous diuretic infusion or total diuretic dose >200 mg daily, associated electrolyte derangement (I.e. K < 3.0), ionotropic drip (continuous infusion), hx of ventricular arrhythmia, or new EF < 35%                          Imaging: Reviewed radiology reports from this admission including: CT, xray, vascular study    Recent Cultures (last 7 days):   Results from last 7 days   Lab Units 10/19/23  1305 10/19/23  1250   GRAM STAIN RESULT  Gram negative rods* Gram negative rods*       Last 24 Hours Medication List:   Current Facility-Administered Medications   Medication Dose Route Frequency Provider Last Rate    acetaminophen  650 mg Oral Q6H PRN Leonides Garcia MD      aspirin  81 mg Oral Daily Leonides Garcia MD      atorvastatin  80 mg Oral Daily With Shell Curtis MD      [START ON 10/21/2023] cefTRIAXone  2,000 mg Intravenous Q24H Ritika Chacon DO      clopidogrel  75 mg Oral Daily Leonides Garcia MD enoxaparin  40 mg Subcutaneous Daily Anne-Marie Sanchez MD      finasteride  5 mg Oral Daily Anne-Marie Sanchez MD      furosemide  40 mg Intravenous BID (diuretic) Anne-Marie Sanchez MD      insulin glargine  30 Units Subcutaneous BID Anne-Marie Sanchez MD      insulin lispro  1-5 Units Subcutaneous TID AC Ministerio Elkins MD      metroNIDAZOLE  500 mg Intravenous Q8H Ritika Aldea,  mg (10/20/23 1700)    ondansetron  4 mg Intravenous Q4H PRN Anne-Marie Sanchez MD      pantoprazole  40 mg Oral Early Morning Anne-Marie Sanchez MD      tamsulosin  0.4 mg Oral Daily With Daria Dillon MD          Today, Patient Was Seen By: Ministerio Elkins MD    **Please Note: This note may have been constructed using a voice recognition system. **

## 2023-10-20 NOTE — ASSESSMENT & PLAN NOTE
Lab Results   Component Value Date    HGBA1C 6.7 (A) 02/02/2023       Recent Labs     10/19/23  2106 10/20/23  0848 10/20/23  1118 10/20/23  1640   POCGLU 96 123 138 142*     Continue current regimen: Lantis 30 units BID and SSI  He is requiring less than his home regimen

## 2023-10-20 NOTE — PLAN OF CARE
Problem: Potential for Falls  Goal: Patient will remain free of falls  Description: INTERVENTIONS:  - Educate patient/family on patient safety including physical limitations  - Instruct patient to call for assistance with activity   - Consult OT/PT to assist with strengthening/mobility   - Keep Call bell within reach  - Keep bed low and locked with side rails adjusted as appropriate  - Keep care items and personal belongings within reach  - Initiate and maintain comfort rounds  - Make Fall Risk Sign visible to staff  - Offer Toileting every 3 Hours, in advance of need  - Initiate/Maintain  BED alarm  - Apply yellow socks and bracelet for high fall risk patients  - Consider moving patient to room near nurses station  Outcome: Progressing     Problem: MOBILITY - ADULT  Goal: Maintain or return to baseline ADL function  Description: INTERVENTIONS:  -  Assess patient's ability to carry out ADLs; assess patient's baseline for ADL function and identify physical deficits which impact ability to perform ADLs (bathing, care of mouth/teeth, toileting, grooming, dressing, etc.)  - Assess/evaluate cause of self-care deficits   - Assess range of motion  - Assess patient's mobility; develop plan if impaired  - Assess patient's need for assistive devices and provide as appropriate  - Encourage maximum independence but intervene and supervise when necessary  - Involve family in performance of ADLs  - Assess for home care needs following discharge   - Consider OT consult to assist with ADL evaluation and planning for discharge  - Provide patient education as appropriate  Outcome: Progressing  Goal: Maintains/Returns to pre admission functional level  Description: INTERVENTIONS:  - Perform BMAT or MOVE assessment daily.   - Set and communicate daily mobility goal to care team and patient/family/caregiver. - Collaborate with rehabilitation services on mobility goals if consulted  - Perform Range of Motion 3 times a day.   - Reposition patient every 2 hours.   - Dangle patient 3 times a day    - Out of bed for toileting  - Record patient progress and toleration of activity level   Outcome: Progressing     Problem: PAIN - ADULT  Goal: Verbalizes/displays adequate comfort level or baseline comfort level  Description: Interventions:  - Encourage patient to monitor pain and request assistance  - Assess pain using appropriate pain scale  - Administer analgesics based on type and severity of pain and evaluate response  - Implement non-pharmacological measures as appropriate and evaluate response  - Consider cultural and social influences on pain and pain management  - Notify physician/advanced practitioner if interventions unsuccessful or patient reports new pain  Outcome: Progressing     Problem: INFECTION - ADULT  Goal: Absence or prevention of progression during hospitalization  Description: INTERVENTIONS:  - Assess and monitor for signs and symptoms of infection  - Monitor lab/diagnostic results  - Monitor all insertion sites, i.e. indwelling lines, tubes, and drains  - Monitor endotracheal if appropriate and nasal secretions for changes in amount and color  - [place] appropriate cooling/warming therapies per order  - Administer medications as ordered  - Instruct and encourage patient and family to use good hand hygiene technique  - Identify and instruct in appropriate isolation precautions for identified infection/condition  Outcome: Progressing     Problem: SAFETY ADULT  Goal: Patient will remain free of falls  Description: INTERVENTIONS:  - Educate patient/family on patient safety including physical limitations  - Instruct patient to call for assistance with activity   - Consult OT/PT to assist with strengthening/mobility   - Keep Call bell within reach  - Keep bed low and locked with side rails adjusted as appropriate  - Keep care items and personal belongings within reach  - Initiate and maintain comfort rounds  - Make Fall Risk Sign visible to staff  - Apply yellow socks and bracelet for high fall risk patients  - Consider moving patient to room near nurses station  Outcome: Progressing  Goal: Maintain or return to baseline ADL function  Description: INTERVENTIONS:  -  Assess patient's ability to carry out ADLs; assess patient's baseline for ADL function and identify physical deficits which impact ability to perform ADLs (bathing, care of mouth/teeth, toileting, grooming, dressing, etc.)  - Assess/evaluate cause of self-care deficits   - Assess range of motion  - Assess patient's mobility; develop plan if impaired  - Assess patient's need for assistive devices and provide as appropriate  - Encourage maximum independence but intervene and supervise when necessary  - Involve family in performance of ADLs  - Assess for home care needs following discharge   - Consider OT consult to assist with ADL evaluation and planning for discharge  - Provide patient education as appropriate  Outcome: Progressing  Goal: Maintains/Returns to pre admission functional level  Description: INTERVENTIONS:  - Perform BMAT or MOVE assessment daily.   - Set and communicate daily mobility goal to care team and patient/family/caregiver.    - Collaborate with rehabilitation services on mobility goals if consulted    - Out of bed for toileting  - Record patient progress and toleration of activity level   Outcome: Progressing     Problem: DISCHARGE PLANNING  Goal: Discharge to home or other facility with appropriate resources  Description: INTERVENTIONS:  - Identify barriers to discharge w/patient and caregiver  - Arrange for needed discharge resources and transportation as appropriate  - Identify discharge learning needs (meds, wound care, etc.)  - Arrange for interpretive services to assist at discharge as needed  - Refer to Case Management Department for coordinating discharge planning if the patient needs post-hospital services based on physician/advanced practitioner order or complex needs related to functional status, cognitive ability, or social support system  Outcome: Progressing     Problem: Knowledge Deficit  Goal: Patient/family/caregiver demonstrates understanding of disease process, treatment plan, medications, and discharge instructions  Description: Complete learning assessment and assess knowledge base.   Interventions:  - Provide teaching at level of understanding  - Provide teaching via preferred learning methods  Outcome: Progressing

## 2023-10-20 NOTE — PLAN OF CARE
Problem: Potential for Falls  Goal: Patient will remain free of falls  Description: INTERVENTIONS:  - Educate patient/family on patient safety including physical limitations  - Instruct patient to call for assistance with activity   - Consult OT/PT to assist with strengthening/mobility   - Keep Call bell within reach  - Keep bed low and locked with side rails adjusted as appropriate  - Keep care items and personal belongings within reach  - Initiate and maintain comfort rounds  - Make Fall Risk Sign visible to staff  - Offer Toileting every 2 Hours, in advance of need  - Initiate/Maintain bed alarm  - Obtain necessary fall risk management equipment:   - Apply yellow socks and bracelet for high fall risk patients  - Consider moving patient to room near nurses station  Outcome: Progressing     Problem: MOBILITY - ADULT  Goal: Maintain or return to baseline ADL function  Description: INTERVENTIONS:  - Educate patient/family on patient safety including physical limitations  - Instruct patient to call for assistance with activity   - Consult OT/PT to assist with strengthening/mobility   - Keep Call bell within reach  - Keep bed low and locked with side rails adjusted as appropriate  - Keep care items and personal belongings within reach  - Initiate and maintain comfort rounds  - Make Fall Risk Sign visible to staff  - Offer Toileting every 2 Hours, in advance of need  - Initiate/Maintain bed alarm  - Obtain necessary fall risk management equipment:   - Apply yellow socks and bracelet for high fall risk patients  - Consider moving patient to room near nurses station  Outcome: Progressing  Goal: Maintains/Returns to pre admission functional level  Description: INTERVENTIONS:  -  Assess patient's ability to carry out ADLs; assess patient's baseline for ADL function and identify physical deficits which impact ability to perform ADLs (bathing, care of mouth/teeth, toileting, grooming, dressing, etc.)  - Assess/evaluate cause of self-care deficits   - Assess range of motion  - Assess patient's mobility; develop plan if impaired  - Assess patient's need for assistive devices and provide as appropriate  - Encourage maximum independence but intervene and supervise when necessary  - Involve family in performance of ADLs  - Assess for home care needs following discharge   - Consider OT consult to assist with ADL evaluation and planning for discharge  - Provide patient education as appropriate  Outcome: Progressing     Problem: PAIN - ADULT  Goal: Verbalizes/displays adequate comfort level or baseline comfort level  Description: Interventions:  - Encourage patient to monitor pain and request assistance  - Assess pain using appropriate pain scale  - Administer analgesics based on type and severity of pain and evaluate response  - Implement non-pharmacological measures as appropriate and evaluate response  - Consider cultural and social influences on pain and pain management  - Notify physician/advanced practitioner if interventions unsuccessful or patient reports new pain  Outcome: Progressing     Problem: INFECTION - ADULT  Goal: Absence or prevention of progression during hospitalization  Description: INTERVENTIONS:  - Assess and monitor for signs and symptoms of infection  - Monitor lab/diagnostic results  - Monitor all insertion sites, i.e. indwelling lines, tubes, and drains  - Monitor endotracheal if appropriate and nasal secretions for changes in amount and color  - [place] appropriate cooling/warming therapies per order  - Administer medications as ordered  - Instruct and encourage patient and family to use good hand hygiene technique  - Identify and instruct in appropriate isolation precautions for identified infection/condition  Outcome: Progressing  Goal: Absence of fever/infection during neutropenic period  Description: INTERVENTIONS:  - Monitor WBC    Outcome: Progressing     Problem: SAFETY ADULT  Goal: Patient will remain free of falls  Description: INTERVENTIONS:  - Educate patient/family on patient safety including physical limitations  - Instruct patient to call for assistance with activity   - Consult OT/PT to assist with strengthening/mobility   - Keep Call bell within reach  - Keep bed low and locked with side rails adjusted as appropriate  - Keep care items and personal belongings within reach  - Initiate and maintain comfort rounds  - Make Fall Risk Sign visible to staff  - Offer Toileting every 2 Hours, in advance of need  - Initiate/Maintain bed alarm  - Obtain necessary fall risk management equipment:   - Apply yellow socks and bracelet for high fall risk patients  - Consider moving patient to room near nurses station  Outcome: Progressing  Goal: Maintain or return to baseline ADL function  Description: INTERVENTIONS:  - Educate patient/family on patient safety including physical limitations  - Instruct patient to call for assistance with activity   - Consult OT/PT to assist with strengthening/mobility   - Keep Call bell within reach  - Keep bed low and locked with side rails adjusted as appropriate  - Keep care items and personal belongings within reach  - Initiate and maintain comfort rounds  - Make Fall Risk Sign visible to staff  - Offer Toileting every 2 Hours, in advance of need  - Initiate/Maintain bed alarm  - Obtain necessary fall risk management equipment:   - Apply yellow socks and bracelet for high fall risk patients  - Consider moving patient to room near nurses station  Outcome: Progressing  Goal: Maintains/Returns to pre admission functional level  Description: INTERVENTIONS:  -  Assess patient's ability to carry out ADLs; assess patient's baseline for ADL function and identify physical deficits which impact ability to perform ADLs (bathing, care of mouth/teeth, toileting, grooming, dressing, etc.)  - Assess/evaluate cause of self-care deficits   - Assess range of motion  - Assess patient's mobility; develop plan if impaired  - Assess patient's need for assistive devices and provide as appropriate  - Encourage maximum independence but intervene and supervise when necessary  - Involve family in performance of ADLs  - Assess for home care needs following discharge   - Consider OT consult to assist with ADL evaluation and planning for discharge  - Provide patient education as appropriate  Outcome: Progressing

## 2023-10-20 NOTE — UTILIZATION REVIEW
Initial Clinical Review    Admission: Date/Time/Statement:   Admission Orders (From admission, onward)       Ordered        10/19/23 1632  INPATIENT ADMISSION  Once                          Orders Placed This Encounter   Procedures    INPATIENT ADMISSION     Standing Status:   Standing     Number of Occurrences:   1     Order Specific Question:   Level of Care     Answer:   Med Surg [16]     Order Specific Question:   Estimated length of stay     Answer:   More than 2 Midnights     Order Specific Question:   Certification     Answer:   I certify that inpatient services are medically necessary for this patient for a duration of greater than two midnights. See H&P and MD Progress Notes for additional information about the patient's course of treatment. ED Arrival Information       Expected   -    Arrival   10/19/2023 12:34    Acuity   Emergent              Means of arrival   Ambulance    Escorted by   [facility]    Admission type   Emergency              Arrival complaint   weakness             Chief Complaint   Patient presents with    Weakness - Generalized     Patient reports generalized weakness that started yesterday, reports chills yesterday. Patient reports wound on L heel has gotten worse. Initial Presentation: 58 y.o. male from home to ED via ems admitted inpatient due to Diabetic Ulcer of right heel/acute on chronic heart failure/elevated troponin/respiratory insuffiencey. Presented due to weakness, dry heaves starting 2 days prior to arrival, has chronic non healing R heel wound. On exam: LBKA. R heel wound, gangrene, foul smelling, yellow/blue drainage on dressing, no sensation to heel/foot at baseline, normal cap refill . Hs tnl  98>249/151. Glucose 147. Na 130. . Sed rate 81. Wbc 12.77.  xray of right foot showed enlarging ulcer. Ct RLE showed diffuse SC edema. In the ED blood cultures done and gram stain gram negative rods.    Given IVF bolus, started on cefepime and vancomycin. 10/19/23 per Podiatry - patient has worsening gangrene to right heel. This has worsening in just a few days and at this point salvage of limb is unlikely. Will need amputation. Plan is consult vascular. Check ct. Recommend edema control. Local wound care. 10/19/23 per Vascular - Patient with non healing non salvageable right heel wound. Will need BKA vs AKA. Plan is LEADS. Check Ct RLE. Continue home Plavix, Lipitor and asa. Continue IV antibiotics. Recommend edema control to right lower extremity with Ace wrap, elevation     Date: 10/20/23    Day 2:  today with nausea. On exam:  right heel wound with dressing. L BKA. Leads pending. Local wound care, compression and ace. Continue home ASA, plavix, Lipitor. Continue antibiotics. Consult Cardiology    Per Cardiology 10/20/23:  patient has elevated troponin and suspect due to demand form worsening foot infection with Known CAD, DM2 and PAD. Plan is check echo. Continue asa, Lipitor and Plavix. IV lasix for RLE swelling. Continue vancomycin and cefepime for infection. Per ID 10/20/23 - patient with sepsis/proteus bacteremia due to progressive right heel gangrene. Plan is dc vancomycin and cefepime, start IV ceftriaxone and add Flagyl.         ED Triage Vitals [10/19/23 1242]   Temperature Pulse Respirations Blood Pressure SpO2   98.7 °F (37.1 °C) 103 (!) 28 161/70 94 %      Temp Source Heart Rate Source Patient Position - Orthostatic VS BP Location FiO2 (%)   Oral Monitor Lying Right arm --      Pain Score       3          Wt Readings from Last 1 Encounters:   10/20/23 113 kg (248 lb 7.3 oz)     Additional Vital Signs:   10/20/23 1121 97.9 °F (36.6 °C) 88 20 130/65 -- 95 % 28 2 L/min Nasal cannula Lying   10/20/23 0823 98 °F (36.7 °C) 87 20 129/72 -- 90 % -- -- None (Room air) Lying   10/20/23 0310 99.6 °F (37.6 °C) 87 20 142/69 88 93 % 28 2 L/min Nasal cannula Lying 10/19/23 2333 98.4 °F (36.9 °C) 83 20 160/71 100 96 % 28 2 L/min Nasal cannula Lying   10/19/23 1819 99.1 °F (37.3 °C) 91 22 134/73 -- 96 % -- -- Nasal cannula Lying   10/19/23 1700 -- 84 22 145/62 96 95 % -- -- Nasal cannula Lying   10/19/23 1645 -- 90 27 Abnormal  152/67 96 95 % -- -- -- --   10/19/23 1545 -- 92 23 Abnormal  146/64 92 93 % -- -- -- --   10/19/23 1505 -- -- -- -- -- 98 % 28 2 L/min Nasal cannula --   10/19/23 1500 -- 98 27 Abnormal  145/72 98 88 % Abnormal    -- -- None (Room air) --   SpO2: ED Provider made aware at 10/19/23 1500   10/19/23 1400 -- 102 30 Abnormal  151/70 101 93 % -- --       Pertinent Labs/Diagnostic Test Results:   VAS lower limb arterial duplex, limited, unilateral   Final Result by Chana Stevenson MD (10/20 1729)   RIGHT LOWER LIMB:  Diffuse atherosclerotic arterial disease noted, with a 50-75% stenosis in the  proximal superficial femoral artery and a 50-75% stenosis in the proximal  superficial femoral artery. Unable to visualize calf vessels due to edema, depth, induration and thickened,  scaly skin. Ankle/Brachial index: supra-normal, consistent with poorly compressible  vessels. Prior . 95 (2020 study). Metatarsal pressure of  7 mm Hg, however, may be unreliable. Great toe pressure of unobtainable, due to flatline ppg waveform. PVR/ PPG tracings are dampened at the ankle, severely attenuated in the  metatarsal/toe. LEFT LOWER LIMB:  BKA  Compared to previous study on 4/18/2023, there are changes and findings as  described above   CT lower extremity wo contrast right   Final Result by Simran Masters MD (10/19 1831)      Severe diffuse subcutaneous edema without subcutaneous gas. Limited evaluation for abscess without IV contrast; no apparent discrete collection. No CT signs of osteomyelitis. The study was marked in [facility] for immediate notification.       Workstation performed: [id number]         XR chest 2 views   ED Interpretation by Elizabeth Joel DO (10/19 1508)   CHEST     INDICATION:   weakness. COMPARISON: Chest radiograph dated 8/29/2021. EXAM PERFORMED/VIEWS:  XR CHEST PA & LATERAL     FINDINGS: Median sternotomy wires. Cardiomediastinal silhouette is stably prominent. No focal airspace consolidation. No pneumothorax or pleural effusion. Old healed right-sided rib fractures. IMPRESSION:     No acute cardiopulmonary disease. Final Result by Zena Kayser, MD (10/19 4280)      No acute cardiopulmonary disease. Workstation performed: [id number]         XR foot 3+ views RIGHT   ED Interpretation by Elizabeth Joel DO (10/19 6447)   IMPRESSION:     No acute osseous abnormality. Large ulcer overlying the plantar aspect of the calcaneus which appears slightly larger than on the prior study. Final Result by Mayra Barth MD (10/19 1451)      No acute osseous abnormality. Large ulcer overlying the plantar aspect of the calcaneus which appears slightly larger than on the prior study. Resident: Beni Santos, the attending radiologist, have reviewed the images and agree with the final report above.       Workstation performed: [id number]           10/19/23 ecg  Sinus tachycardia  Poor anterior R wave progression is noted  Abnormal ECG  When compared with ECG of 29-AUG-2021 12:11,  No significant change was found    10/19/23 ecg Sinus tachycardia with frequent Premature ventricular complexes  Otherwise normal ECG  When compared with ECG of 19-OCT-2023 12:42,  Premature ventricular complexes are now Present    10/19/23 ecg Sinus rhythm with occasional Premature ventricular complexes  Cannot rule out Anterior infarct , age undetermined  Abnormal ECG  When compared with ECG of 19-OCT-2023 14:49,  No significant change was found    Results from last 7 days   Lab Units 10/19/23  1315   SARS-COV-2  Negative     Results from last 7 days   Lab Units 10/20/23  0505 10/19/23  1833 10/19/23  1250   WBC Thousand/uL 8.12  --  12.77*   HEMOGLOBIN g/dL 11.3*  --  12.5   HEMATOCRIT % 35.8*  --  40.3   PLATELETS Thousands/uL 319 301 348   NEUTROS ABS Thousands/µL 6.84  --   --    BANDS PCT %  --   --  3     Results from last 7 days   Lab Units 10/20/23  0505 10/19/23  1250   SODIUM mmol/L 129* 130*   POTASSIUM mmol/L 4.0 4.8   CHLORIDE mmol/L 96 94*   CO2 mmol/L 27 28   ANION GAP mmol/L 6 8   BUN mg/dL 13 16   CREATININE mg/dL 0.82 0.95   EGFR ml/min/1.73sq m 93 84   CALCIUM mg/dL 8.1* 8.9     Results from last 7 days   Lab Units 10/20/23  0505 10/19/23  1250   AST U/L 15 17   ALT U/L 11 13   ALK PHOS U/L 52 69   TOTAL PROTEIN g/dL 6.8 8.3   ALBUMIN g/dL 2.9* 3.5   TOTAL BILIRUBIN mg/dL 0.57 0.58     Results from last 7 days   Lab Units 10/20/23  1640 10/20/23  1118 10/20/23  0848 10/19/23  2106 10/19/23  1303   POC GLUCOSE mg/dl 142* 138 123 96 146*     Results from last 7 days   Lab Units 10/20/23  0505 10/19/23  1250   GLUCOSE RANDOM mg/dL 118 147*     Results from last 7 days   Lab Units 10/19/23  2228 10/19/23  2049 10/19/23  1833 10/19/23  1708 10/19/23  1451 10/19/23  1250   HS TNI 0HR ng/L  --   --  767*  --   --  98*   HS TNI 2HR ng/L  --  1,295*  --   --  249*  --    HSTNI D2 ng/L  --  528*  --   --  151*  --    HS TNI 4HR ng/L 1,182*  --   --  785*  --   --    HSTNI D4 ng/L 415*  --   --  687*  --   --      Results from last 7 days   Lab Units 10/19/23  1250   PROTIME seconds 14.0   INR  1.08   PTT seconds 22*     Results from last 7 days   Lab Units 10/20/23  0505 10/19/23  1833 10/19/23  1250   PROCALCITONIN ng/ml 0.19 0.11 0.08     Results from last 7 days   Lab Units 10/19/23  1833 10/19/23  1250   LACTIC ACID mmol/L 1.3 1.8     Results from last 7 days   Lab Units 10/19/23  1250   BNP pg/mL 63     Results from last 7 days   Lab Units 10/19/23  1250   LIPASE u/L 9*     Results from last 7 days   Lab Units 10/19/23  1250   CRP mg/L 108.0*   SED RATE mm/hour 81*     Results from last 7 days   Lab Units 10/19/23  1313   CLARITY UA  Clear   COLOR UA  Yellow   SPEC GRAV UA  1.018   PH UA  7.5   GLUCOSE UA mg/dl Negative   KETONES UA mg/dl Negative   BLOOD UA  Negative   PROTEIN UA mg/dl 70 (1+)*   NITRITE UA  Negative   BILIRUBIN UA  Negative   UROBILINOGEN UA (BE) mg/dl 4.0*   LEUKOCYTES UA  Negative   WBC UA /hpf 1-2   RBC UA /hpf 4-10*   BACTERIA UA /hpf None Seen   EPITHELIAL CELLS WET PREP /hpf Occasional   MUCUS THREADS  Occasional*     Results from last 7 days   Lab Units 10/19/23  1315   INFLUENZA A PCR  Negative   INFLUENZA B PCR  Negative   RSV PCR  Negative     Results from last 7 days   Lab Units 10/19/23  1305 10/19/23  1250   GRAM STAIN RESULT  Gram negative rods* Gram negative rods*         ED Treatment:   Medication Administration from 10/19/2023 1234 to 10/19/2023 1800         Date/Time Order Dose Route Action Comments     10/19/2023 1317 EDT cefepime (MAXIPIME) IVPB (premix in dextrose) 2,000 mg 50 mL 2,000 mg Intravenous New Bag --     10/19/2023 1352 EDT vancomycin (VANCOCIN) 1,750 mg in sodium chloride 0.9 % 500 mL IVPB 1,750 mg Intravenous New Bag --     10/19/2023 1316 EDT sodium chloride 0.9 % bolus 500 mL 500 mL Intravenous New Bag --     10/19/2023 1708 EDT LORazepam (ATIVAN) injection 0.5 mg 0.5 mg Intravenous Given --          Past Medical History:   Diagnosis Date    Amputated below knee, left ([address]) 10/4/2018    Anxiety     Anxiety and depression     Cataract     Congestive cardiac failure (HCC)     Coronary artery disease     Depression     Diabetes mellitus ([address])     Diabetic autonomic neuropathy associated with type 2 diabetes mellitus ([address])     Last Assessed: 12/28/2017    History of DVT (deep vein thrombosis)     left LE    Hyperlipidemia     Lymphedema of right lower extremity     Obesity     Orthostatic hypotension      Present on Admission:   Acute on chronic heart failure with preserved ejection fraction (HCC)   Diabetic gastroparesis    Hyperlipidemia   PAD (peripheral artery disease) (HCC)   Diabetic ulcer of right heel (HCC)   Orthostatic hypotension      Admitting Diagnosis: Gangrene ([address]) [I96]  Weakness [R53.1]  Elevated troponin [R79.89]  Diabetic ulcer of right heel associated with type 2 diabetes mellitus, with fat layer exposed ([address]) [V29.569, L97.412]  Age/Sex: 58 y.o. male  Admission Orders:  10/19/23 1632 inpatient   Scheduled Medications:  aspirin, 81 mg, Oral, Daily  atorvastatin, 80 mg, Oral, Daily With Dinner  cefepime, 2,000 mg, Intravenous, Q12H - dc 1503 10/20/23  clopidogrel, 75 mg, Oral, Daily  enoxaparin, 40 mg, Subcutaneous, Daily  finasteride, 5 mg, Oral, Daily  furosemide, 40 mg, Intravenous, BID (diuretic)  insulin glargine, 30 Units, Subcutaneous, BID  insulin lispro, 1-5 Units, Subcutaneous, TID AC  pantoprazole, 40 mg, Oral, Early Morning  tamsulosin, 0.4 mg, Oral, Daily With Dinner  vancomycin, 1,250 mg, Intravenous, Q12H dc 10/20/23 1502    metroNIDAZOLE (FLAGYL) IVPB (premix) 500 mg 100 mL  Dose: 500 mg  Freq: Every 8 hours Route: IV  Start: 10/20/23 1545   cefTRIAXone (ROCEPHIN) IVPB (premix in dextrose) 2,000 mg 50 mL  Dose: 2,000 mg  Freq: Every 24 hours Route: IV  Start: 10/21/23 0100     Continuous IV Infusions: none      PRN Meds:  acetaminophen, 650 mg, Oral, Q6H PRN  ondansetron, 4 mg, Intravenous, Q4H PRN x 2 10/20/23     Telemetry   Wound care:  Right heel daily and prn:  betadine, 4x4, abd and Sunny                         Right le layer tinsley compressive ACE wrap to the Leg from the ankle to the knee     IP CONSULT TO PODIATRY  IP CONSULT TO VASCULAR SURGERY  IP CONSULT TO CARDIOLOGY    Network Utilization Review Department  ATTENTION: Please call with any questions or concerns to [phone] and carefully listen to the prompts so that you are directed to the right person.  All voicemails are confidential.   For Discharge needs, contact Care Management DC Support Team at [phone] opt. 2  Send all requests for admission clinical reviews, approved or denied determinations and any other requests to dedicated fax number below belonging to the campus where the patient is receiving treatment.  List of dedicated fax numbers for the Facilities:  [place] DENIALS (Administrative/Medical Necessity) [phone]   DISCHARGE SUPPORT TEAM (NETWORK) [id number] (Maternity/NICU/Pediatrics) [phone]   [address] [place] [address] Drive [address] [place] [address] [facility] [phone]   [address] [address] [facility] [address] [phone]

## 2023-10-20 NOTE — CONSULTS
Cardiology Consultation  Rosina Allen, MD Annell Landau, MD, Rock Real DO, [facility] CountyNAZARIO MARTÍNEZ MD Wil, DO, Tonia Pickering DO, [facility]  ----------------------------------------------------------------  [address] [facility]  [address] [facility], [address] [employer] 58 y.o. male MRN: [id number]  Unit/Bed#: E4 -01 Encounter: [id number]      10/20/23    Referring Physician: Eveline Stearns Pe*    Chief Complain/Reason for Referal: Elevated troponin    IMPRESSION:  Elevated troponin, suspect secondary to demand from worsening infection of right foot wound on top of underlying already known coronary artery disease Resaid multivessel disease diagnosed 5 years ago now status post CABG with type 2 diabetes and peripheral arterial disease  Coronary artery disease status post CABG in 2018  Type 2 diabetes  Peripheral arterial disease  Chronic right heel ulcer  Left below-knee amputation  Obesity  Ambulatory dysfunction, uses prosthetic left leg after amputation  Cardiac murmur, left sternal border      DISCUSSION/RECOMMENDATIONS:  His troponin did elevate rather significantly but he has no symptoms of angina at all he states. No shortness of breath no chest pain or no chest pressure.   His ECG is negative for any acute findings or changes compared to prior electrocardiograms  We will check echocardiogram given his history and positive troponins in the setting of diabetes with significant underlying vascular disease, assess for new regional wall motion changes or reduced EF, assess for valvular heart disease given his heart murmur  Continue aspirin 81 Lipitor 80 Plavix 75 for CAD/CABG history  Continue with Lasix 40 IV twice daily given his right lower extremity swelling  Continue vancomycin and cefepime as per primary team for right lower extremity suspected infection  Considering all the above with his history of diabetes peripheral arterial disease coronary disease with CABG 5 years ago and positive troponins he is going to be at elevated risk for any type of surgery that may be necessary, fortunately he does not appear to be symptomatic with angina type symptoms at this time but we will check echo as described above for further assessment of underlying cardiac condition  We will hold off on heparin as his presentation is not consistent with acute coronary syndrome/plaque rupture/platelet aggregation    Daren Hassan DO, [facility], [facility]    ----------------------------------------------------  EKG: Sinus rhythm with poor anterior R wave progression, occasional PVCs      ======================================================    HPI:  I am seeing this patient in cardiology consultation for: Elevated troponin    Nina Benjamin is a 58 y.o. male with:   Coronary artery disease status post CABG in 2018  Type 2 diabetes  Peripheral arterial disease  Chronic right heel ulcer  Left below-knee amputation  Obesity  Ambulatory dysfunction, uses prosthetic left leg after amputation    He presented yesterday with weakness chills and nausea. He states that he felt just overall sick with chills weakness and vomiting and nausea. Denies chest pain palpitations chest pressure shortness of breath or abdominal pain. He was ultimately admitted due to concern for worsening infection of his right lower extremity. He has prior history of left lower extremity amputation. He was found to have positive troponins on admission which have subsequently trended up but he has no angina type symptoms. In the past he was admitted in 2018 for worsening diabetic foot wound at that time had similar picture with positive troponins ultimately and was diagnosed with multivessel coronary disease and required bypass grafting at that time. .        Past Medical History:   Diagnosis Date    Amputated below knee, left ([address]) 10/4/2018    Anxiety     Anxiety and depression     Cataract Congestive cardiac failure ([address])     Coronary artery disease     Depression     Diabetes mellitus ([address])     Diabetic autonomic neuropathy associated with type 2 diabetes mellitus ([address])     Last Assessed: 12/28/2017    History of DVT (deep vein thrombosis)     left LE    Hyperlipidemia     Lymphedema of right lower extremity     Obesity     Orthostatic hypotension          Scheduled Meds:  Current Facility-Administered Medications   Medication Dose Route Frequency Provider Last Rate    acetaminophen  650 mg Oral Q6H PRN Ronnie Garcia MD      aspirin  81 mg Oral Daily Ronnie Garcia MD      atorvastatin  80 mg Oral Daily With Laura Kaye MD      cefepime  2,000 mg Intravenous Q12H Ronnie Garcia MD 2,000 mg (10/20/23 0103)    clopidogrel  75 mg Oral Daily Ronnie Garcia MD      enoxaparin  40 mg Subcutaneous Daily Ronnie Garcia MD      finasteride  5 mg Oral Daily Ronnie Garcia MD      furosemide  40 mg Intravenous BID (diuretic) Ronnie Garcia MD      insulin glargine  30 Units Subcutaneous BID Ronnie Garcia MD      insulin lispro  1-5 Units Subcutaneous TID AC Bhanu Montiel MD      ondansetron  4 mg Intravenous Q4H PRN Ronnie Garcia MD      pantoprazole  40 mg Oral Early Morning Ronnie Garcia MD      tamsulosin  0.4 mg Oral Daily With Laura Kaye MD      vancomycin  1,250 mg Intravenous Q12H Bhanu Montiel MD       Continuous Infusions:   PRN Meds:.  acetaminophen    ondansetron  No Known Allergies  I reviewed the Home Medication list in the chart.      Family History   Problem Relation Age of Onset    Atrial fibrillation Mother     Stroke Mother     Hypertension Father     Heart attack Paternal Grandfather 61    Diabetes Maternal Grandmother     Diabetes Maternal Grandfather     Diabetes Maternal Aunt     Diabetes Maternal Uncle        Social History     Socioeconomic History    Marital status: /Civil Union     Spouse name: Not on file    Number of children: Not on file    Years of education: Not on file    Highest education level: Not on file   Occupational History    Not on file   Tobacco Use    Smoking status: Former     Packs/day: 1.00     Years: 12.00     Total pack years: 12.00     Types: Cigarettes     Quit date: 3/23/1994     Years since quittin.5    Smokeless tobacco: Never   Vaping Use    Vaping Use: Never used   Substance and Sexual Activity    Alcohol use: Never     Comment: rarely    Drug use: No    Sexual activity: Not Currently   Other Topics Concern    Not on file   Social History Narrative    Not on file     Social Determinants of Health     Financial Resource Strain: Not on file   Food Insecurity: No Food Insecurity (2023)    Hunger Vital Sign     Worried About Running Out of Food in the Last Year: Never true     Ran Out of Food in the Last Year: Never true   Transportation Needs: No Transportation Needs (2023)    PRAPARE - Transportation     Lack of Transportation (Medical): No     Lack of Transportation (Non-Medical): No   Physical Activity: Not on file   Stress: Not on file   Social Connections: Moderately Isolated (10/29/2020)    Social Connection and Isolation Panel [NHANES]     Frequency of Communication with Friends and Family: Three times a week     Frequency of Social Gatherings with Friends and Family:  Three times a week     Attends Mandaen Services: Never     Active Member of Clubs or Organizations: No     Attends Club or Organization Meetings: Never     Marital Status:    Intimate Partner Violence: Not At Risk (10/29/2020)    Humiliation, Afraid, Rape, and Kick questionnaire     Fear of Current or Ex-Partner: No     Emotionally Abused: No     Physically Abused: No     Sexually Abused: No   Housing Stability: Low Risk  (2023)    Housing Stability Vital Sign     Unable to Pay for Housing in the Last Year: No     Number of [address] in the Last Year: 1     Unstable Housing in the Last Year: No       Review of Systems Review of Systems   Constitutional:  Positive for chills and fatigue. Negative for fever. HENT:  Negative for facial swelling and sore throat. Eyes:  Negative for visual disturbance. Respiratory:  Negative for cough, chest tightness, shortness of breath and wheezing. Cardiovascular:  Negative for chest pain, palpitations and leg swelling. Gastrointestinal:  Positive for nausea and vomiting. Negative for abdominal pain, blood in stool, constipation and diarrhea. Endocrine: Negative for cold intolerance and heat intolerance. Genitourinary:  Negative for decreased urine volume, difficulty urinating, dysuria and hematuria. Musculoskeletal:  Negative for arthralgias, back pain and myalgias. Skin:  Negative for rash. Neurological:  Positive for weakness. Negative for dizziness, syncope and numbness. Psychiatric/Behavioral:  Negative for agitation, behavioral problems and confusion. The patient is not nervous/anxious. Vitals:    10/20/23 0823   BP: 129/72   Pulse: 87   Resp: 20   Temp: 98 °F (36.7 °C)   SpO2: 90%     I/O         10/18 0701  10/19 0700 10/19 0701  10/20 0700 10/20 0701  10/21 0700    IV Piggyback  1050     Total Intake(mL/kg)  1050 (9.3)     Urine (mL/kg/hr)  700     Total Output  700     Net  +350                  Weight (last 2 days)       Date/Time Weight    10/20/23 0538 113 (248.46)    10/19/23 1819 116 (255.51)    10/19/23 1242 120 (265.21)            Physical Exam  Constitutional: awake, alert and oriented, in no acute distress, no obvious deformities, obese male  Head: Normocephalic, without obvious abnormality, atraumatic  Eyes: conjunctivae clear and moist. Sclera anicteric. No xanthelasmas. Pupils equal bilaterally. Extraocular motions are full.   Ear nose mouth and throat: ears are symmetrical bilaterally, hearing appears to be equal bilaterally, no nasal discharge or epistaxis, oropharynx is clear with moist mucous membranes  Neck: Trachea is midline, neck is supple, no thyromegaly or significant lymphadenopathy, there is full range of motion. Lungs: clear to auscultation bilaterally, no wheezes, no rales, no rhonchi, no accessory muscle use, breathing is nonlabored  Heart: regular rate and rhythm, S1, S2 normal, he has a 2 out of 6 to 3 out of 6 systolic murmur at the left sternal border no click, no rub and no gallop  Abdomen: Obese, soft, non-tender; bowel sounds normal; no masses, no organomegaly  Psychiatric: Patient is oriented to time, place, person, mood/affect is negative for depression, anxiety, agitation, appears to have appropriate insight  Skin: Skin is warm, dry, intact. No obvious rashes or lesions on exposed extremities. Nail beds are pink with no cyanosis or clubbing. His left lower extremity below-knee amputation with a significant wound of the right lower extremity which is wrapped    Results from last 7 days   Lab Units 10/20/23  0505 10/19/23  1833 10/19/23  1250   WBC Thousand/uL 8.12  --  12.77*   HEMOGLOBIN g/dL 11.3*  --  12.5   HEMATOCRIT % 35.8*  --  40.3   PLATELETS Thousands/uL 319 301 348   NEUTROS PCT % 84*  --   --    MONOS PCT % 6  --   --    MONO PCT %  --   --  3*   EOS PCT % 0  --  0     Results from last 7 days   Lab Units 10/20/23  0505 10/19/23  1250   POTASSIUM mmol/L 4.0 4.8   CHLORIDE mmol/L 96 94*   CO2 mmol/L 27 28   BUN mg/dL 13 16   CREATININE mg/dL 0.82 0.95   CALCIUM mg/dL 8.1* 8.9     Results from last 7 days   Lab Units 10/20/23  0505 10/19/23  1250   POTASSIUM mmol/L 4.0 4.8   CHLORIDE mmol/L 96 94*   CO2 mmol/L 27 28   BUN mg/dL 13 16   CREATININE mg/dL 0.82 0.95   CALCIUM mg/dL 8.1* 8.9   ALK PHOS U/L 52 69   ALT U/L 11 13   AST U/L 15 17     No results found for: "TROPONINT"              Results from last 7 days   Lab Units 10/19/23  1250   INR  1.08               I have personally reviewed the EKG, CXR and Telemetry images directly.       Patient Active Problem List    Diagnosis Date Noted    Respiratory insufficiency 10/19/2023    Elevated troponin 10/19/2023    Loose stools 09/11/2023    Diabetic ulcer of right heel ([address]) 08/29/2023    Lymphedema in adult patient 06/27/2023    Cellulitis of right ankle 04/18/2023    Non-pressure chronic ulcer of right calf with fat layer exposed ([address]) 02/02/2023    Embolism and thrombosis of arteries of the lower extremities ([address]) 10/29/2020    Lymphedema of right lower extremity 10/29/2020    Venous stasis dermatitis of right lower extremity 10/29/2020    Abdominal distension 10/29/2020    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    Nodular rash 02/04/2020    CAD (coronary artery disease) 08/11/2019    Phantom limb syndrome without pain ([address]) 10/04/2018    Obstructive sleep apnea 07/28/2018    Hyponatremia 04/02/2018    Diabetic autonomic neuropathy associated with type 2 diabetes mellitus ([address]) 03/29/2018    S/P CABG x 3 03/28/2018    Acute on chronic heart failure with preserved ejection fraction ([address]) 03/24/2018    Hypertensive heart disease with congestive heart failure (HCC)     Triple vessel coronary artery disease 03/23/2018    Diabetic gastroparesis  03/19/2018    Class 2 severe obesity due to excess calories with serious comorbidity and body mass index (BMI) of 35.0 to 35.9 in adult  03/19/2018    Orthostatic hypotension 03/16/2018    PAD (peripheral artery disease) ([address]) 03/14/2018    S/P BKA (below knee amputation), left ([address]) 03/14/2018    Anxiety and depression 12/28/2017    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 12/28/2017    Vitamin D deficiency 09/20/2016    Hyperlipidemia 02/11/2015    Type 2 diabetes mellitus with diabetic neuropathy, with long-term current use of insulin ([address]) 11/06/2014       Portions of the record may have been created with voice recognition software. Occasional wrong word or "sound a like" substitutions may have occurred due to the inherent limitations of voice recognition software.  Read the chart carefully and recognize, using context, where substitutions have occurred.     Neela Moore DO, [facility]  10/20/2023 11:13 AM

## 2023-10-20 NOTE — ASSESSMENT & PLAN NOTE
For right BKA vs AKA next week per vascular surgery  Antibiotic regimen switched to ceftriaxone and flagyl per ID  Local care and pain control

## 2023-10-20 NOTE — CONSULTS
Consultation - Infectious Disease   Anabell Veliz 58 y.o. male MRN: [id number]  Unit/Bed#: E4 -01 Encounter: [id number]      IMPRESSION & RECOMMENDATIONS:   Impression/Recommendations:  1. Sepsis, POA. Tachycardia, tachypnea, leukocytosis. Secondary to bacteremia. Fortunately, patient is afebrile and hemodynamically stable. WBC count has normalized. -Antibiotic plan as below  -Follow temperatures and hemodynamics  -Follow CBC    2. Proteus bacteremia. Secondary to #3. No other clear explanation. Prior wound culture from the foot did isolate Proteus, in addition to E. coli and anaerobes.    -Discontinue vancomycin/cefepime  -Start IV ceftriaxone 2 g every 24 hours  -Add Flagyl for now. Will give IV as patient is nauseous with dry heaving. Okay to change to oral formulation once patient is tolerating oral intake.  -Follow-up final blood cultures and tailor antibiotics accordingly.  -Anticipate eventual transition to oral antibiotic postoperatively, if susceptibilities allow. 3.  Progressive right heel gangrene. Prior wound cultures August 2023 with growth of E. coli, Proteus, Bacteroides. CT without subcutaneous gas or discrete abscess. Podiatry evaluation noted and foot has been deemed nonsalvageable. Vascular surgery planning amputation early next week. -Antibiotic plan as above  -Podiatry/vascular surgery follow-up ongoing  -Tentative plan for BKA versus AKA early next week    4. Diabetes mellitus type 2, with peripheral neuropathy and long-term insulin use. Risk factor for infection and poor wound healing. 5.  PAD post left BKA 2018. 6.  Elevated troponin. Cardiology input noted with suspicion for demand ischemia in the setting of acute infection. Patient had prior CABG in 2018. Cardiology follow-up ongoing. Antibiotics:  Vancomycin/cefepime 2      I discussed above plan with patient and his wife at bedside, and with Dr. Nicole Salcedo.   I personally reviewed prior hospitalization and outpatient medical records. Thank you for this consultation. We will follow along with you. HISTORY OF PRESENT ILLNESS:  Reason for Consult: Bacteremia    HPI: Kelly De La Cruz is a 58 y.o. male with CAD post CABG, diabetes mellitus with peripheral neuropathy, PAD, prior left BKA 2018, lower extremity lymphedema who presented on 10/19 with worsening right heel wound over the last 3 months now with gangrenous changes. Patient has been feeling sick for 2 days prior to admission with generalized malaise, chills. Noted to be tachypneic, tachycardic with leukocytosis on presentation. He was evaluated by podiatry and foot was deemed nonsalvageable. Patient was started empirically on vancomycin and cefepime. Now blood cultures have isolated Proteus. Of note, recent wound culture from August 2023 grew E. coli, Proteus and Bacteroides. Patient will be going for right lower extremity amputation early next week. Patient currently states he feels terrible with nausea and dry heaving. No focal abdominal pain. No new urinary symptoms. REVIEW OF SYSTEMS:    A complete system-based review of systems is otherwise negative.     PAST MEDICAL HISTORY:  Past Medical History:   Diagnosis Date    Amputated below knee, left ([address]) 10/4/2018    Anxiety     Anxiety and depression     Cataract     Congestive cardiac failure (HCC)     Coronary artery disease     Depression     Diabetes mellitus ([address])     Diabetic autonomic neuropathy associated with type 2 diabetes mellitus ([address])     Last Assessed: 12/28/2017    History of DVT (deep vein thrombosis)     left LE    Hyperlipidemia     Lymphedema of right lower extremity     Obesity     Orthostatic hypotension      Past Surgical History:   Procedure Laterality Date    CORONARY ARTERY BYPASS GRAFT      EYE SURGERY      cataracts bilateral    LEG AMPUTATION THROUGH LOWER TIBIA AND FIBULA Left 8/3/2018    Procedure: AMPUTATION BELOW KNEE (BKA) L BKA;  Surgeon: Jason Hernandez MD; Location: [facility];  Service: Vascular    [facility] CORONARY ARTERY BYP W/VEIN & ARTERY GRAFT 4 VEIN N/A 3/28/2018    Procedure: CORONARY ARTERY BYPASS GRAFT (CABG) x3 VESSELS, LIMA TO LAD, SVG--> PDA, SVG--> OM2,  RIGHT LEG EVH/SVH TO , INTRA-OP AMANDEEP;  Surgeon: Isabel Miranda MD;  Location: [facility];  Service: Cardiac Surgery    ROTATOR CUFF REPAIR Bilateral        FAMILY HISTORY:  Non-contributory    SOCIAL HISTORY:  Social History     Substance and Sexual Activity   Alcohol Use Never    Comment: rarely     Social History     Substance and Sexual Activity   Drug Use No     Social History     Tobacco Use   Smoking Status Former    Packs/day: 1.00    Years: 12.00    Total pack years: 12.00    Types: Cigarettes    Quit date: 3/23/1994    Years since quittin.5   Smokeless Tobacco Never       ALLERGIES:  No Known Allergies    MEDICATIONS:  All current active medications have been reviewed. PHYSICAL EXAM:  Vitals:  Temp:  [97.9 °F (36.6 °C)-99.6 °F (37.6 °C)] 97.9 °F (36.6 °C)  HR:  [83-98] 88  Resp:  [20-27] 20  BP: (129-160)/(62-73) 130/65  SpO2:  [88 %-98 %] 95 %  Temp (24hrs), Av.6 °F (37 °C), Min:97.9 °F (36.6 °C), Max:99.6 °F (37.6 °C)  Current: Temperature: 97.9 °F (36.6 °C)     Physical Exam:  General: Pale, nontoxic, awake and appropriately conversant  Eyes:  Conjunctive clear with no hemorrhages or effusions  Oropharynx:  No ulcers, no lesions  Neck:  Supple, trachea midline  Lungs:  Clear to auscultation bilaterally, no accessory muscle use  Cardiac:  Regular rate and rhythm  Abdomen:  Soft, non-tender, non-distended  Extremities: Left BKA. Right foot dressing is intact. Skin:  No diffuse rashes. Media images of foot were personally reviewed. Neurological:  Moves all four extremities spontaneously    LABS, IMAGING, & OTHER STUDIES:  Lab Results:  I have personally reviewed pertinent labs.   Results from last 7 days   Lab Units 10/20/23  0505 10/19/23  1250   POTASSIUM mmol/L 4.0 4.8   CHLORIDE mmol/L 96 94*   CO2 mmol/L 27 28   BUN mg/dL 13 16   CREATININE mg/dL 0.82 0.95   EGFR ml/min/1.73sq m 93 84   CALCIUM mg/dL 8.1* 8.9   AST U/L 15 17   ALT U/L 11 13   ALK PHOS U/L 52 69     Results from last 7 days   Lab Units 10/20/23  0505 10/19/23  1833 10/19/23  1250   WBC Thousand/uL 8.12  --  12.77*   HEMOGLOBIN g/dL 11.3*  --  12.5   PLATELETS Thousands/uL 319 301 348     Results from last 7 days   Lab Units 10/19/23  1305 10/19/23  1250   GRAM STAIN RESULT  Gram negative rods* Gram negative rods*         Imaging Studies:   I have personally reviewed pertinent imaging study reports and images in PACS. CT right lower extremity shows severe diffuse subcutaneous edema without subcutaneous gas. Limited for abscess without IV contrast.  No discrete collection. No osteomyelitis. Chest x-ray with no acute cardiopulmonary disease. EKG, Pathology, and Other Studies:   I have personally reviewed pertinent reports.

## 2023-10-20 NOTE — ASSESSMENT & PLAN NOTE
POA   Due to right heel wound with gangrene and gram negative bacteremia  See plan for individual problems below

## 2023-10-20 NOTE — QUICK NOTE
2 layer Moura compression dressing was placed on right lower extremity above the level of the ankle to the knee. To help with lymphedema in preparation for surgery as requested by vascular surgery team.  Dressings can be removed for arterial Doppler. Wound care orders are placed.   Podiatry signing off

## 2023-10-20 NOTE — PROGRESS NOTES
[address] [facility]  Progress Note  Name: Orquidea Leiva  MRN: [id number]  Unit/Bed#: E4 -01 I Date of Admission: 10/19/2023   Date of Service: 10/20/2023 I Hospital Day: 1    Assessment/Plan   PAD (peripheral artery disease) [facility])  Assessment & Plan  57 yo male former smoker (quit 1994) w/ a hx of DM II, CAD S/P CABG w/ R GSV, PAD, chronic R heel ulcer, hx of L BKA presented to [facility] on 10/19/23 w/ weakness, chills, nausea and vomiting. Pt admitted w/ worsening R foot wound. R foot xray shows large ulcer overlying plantar aspect of calcaneous. CT RLE shows severe diffuse subcutaneous edema without subcutaneous gas; no OM. Pt seen in consult by podiatry for R heel wound. Per podiatry, limb salvage is unlikely and pt will need amputation of RLE. LEAD ordered to assess healing potential of BKA vs AKA (pt refused last evening). Cardiology consulted for operative risk stratification. 2-layer Moura compression drsg placed by podiatry to assist w/ RLE edema. Vascular surgery consulted re: need for RLE amputation (BKA vs AKA). Pt has a hx of PAD for which he had a L BKA by Dr. Gato Valle on 8/3/18 secondary to LLE tissue loss and unsuccessful L popliteal recanalization on 3/19/18. Pt has not had a follow-up w/ vascular since 8/31/18. Pt takes ASA, plavix and lipitor at home. Diagnostics:   -10/19/23: CT RLE: severe diffuse subcutaneous edema without subcutaneous gas; no OM.   -10/19/23: xray R foot: large ulcer overlying plantar aspect of calcaneous. Labs (10/20):   WBC 8.12 (12.77)  Procalcitonin 1.19 (0.08)  BC: 2/2 (+) for gm (-) rods    Plan:  -RLE w/ worsening heel gangrene; wound non-salvageable, per podiatry  -Will need RLE amputation; BKA vs AKA  -LEADS pending to determine healing potential of BKA vs AKA (pt refused last evening; will attempt again today)  -Continue wound care per podiatry recommendations; input appreciated  -Cardiology consulted re: operative risk stratification; input appreciated   -Continue home ASA, plavix, lipitor  -Continue compression and elevation for RLE edema  -BC growing gm (-) rods; continue ABX per primary team  -Discuss w/ Dr. Joyner Grief Doctor      Subjective:  Pt seen for exam while sitting upright in bed; NAD. Pt reports nausea as his only complaint. Per pt, he has no feeling in his RLE secondary to longstanding neuropathy. He has a hx of a L BKA for which he has a prosthesis. Per pt, he is able to ambulate short distances w/ his prosthesis and a walker, and navigate the 4 steps necessary to enter his home. While in his home, he frequently uses a wheelchair for mobility. He understands the need for an amputation, and hopes it can be a BKA so he has a chance of continuing to walk. Otherwise, pt denies any further complaints at this time. Vitals:  /72 (BP Location: Right arm)   Pulse 87   Temp 98 °F (36.7 °C) (Temporal)   Resp 20   Ht 6' 1" (1.854 m)   Wt 113 kg (248 lb 7.3 oz)   SpO2 90%   BMI 32.78 kg/m²     I/Os:  I/O last 3 completed shifts: In: 1050 [IV Piggyback:1050]  Out: 700 [Urine:700]  No intake/output data recorded.     Lab Results and Cultures:   Lab Results   Component Value Date    WBC 8.12 10/20/2023    HGB 11.3 (L) 10/20/2023    HCT 35.8 (L) 10/20/2023    MCV 83 10/20/2023     10/20/2023     Lab Results   Component Value Date    GLUCOSE 165 (H) 03/28/2018    CALCIUM 8.1 (L) 10/20/2023     09/13/2016    K 4.0 10/20/2023    CO2 27 10/20/2023    CL 96 10/20/2023    BUN 13 10/20/2023    CREATININE 0.82 10/20/2023     Lab Results   Component Value Date    INR 1.08 10/19/2023    INR 1.08 08/29/2023    INR 0.99 08/29/2021    PROTIME 14.0 10/19/2023    PROTIME 14.0 08/29/2023    PROTIME 12.9 08/29/2021     Urinalysis:   Lab Results   Component Value Date    COLORU Yellow 10/19/2023    CLARITYU Clear 10/19/2023    SPECGRAV 1.018 10/19/2023    PHUR 7.5 10/19/2023    PHUR 7.5 05/23/2018    LEUKOCYTESUR Negative 10/19/2023    NITRITE Negative 10/19/2023    GLUCOSEU Negative 10/19/2023    KETONESU Negative 10/19/2023    BILIRUBINUR Negative 10/19/2023    BLOODU Negative 10/19/2023       Medications:  Current Facility-Administered Medications   Medication Dose Route Frequency    acetaminophen (TYLENOL) tablet 650 mg  650 mg Oral Q6H PRN    aspirin (ECOTRIN LOW STRENGTH) EC tablet 81 mg  81 mg Oral Daily    atorvastatin (LIPITOR) tablet 80 mg  80 mg Oral Daily With Dinner    cefepime (MAXIPIME) IVPB (premix in dextrose) 2,000 mg 50 mL  2,000 mg Intravenous Q12H    clopidogrel (PLAVIX) tablet 75 mg  75 mg Oral Daily    enoxaparin (LOVENOX) subcutaneous injection 40 mg  40 mg Subcutaneous Daily    finasteride (PROSCAR) tablet 5 mg  5 mg Oral Daily    furosemide (LASIX) injection 40 mg  40 mg Intravenous BID (diuretic)    insulin glargine (LANTUS) subcutaneous injection 30 Units 0.3 mL  30 Units Subcutaneous BID    insulin lispro (HumaLOG) 100 units/mL subcutaneous injection 1-5 Units  1-5 Units Subcutaneous TID AC    ondansetron (ZOFRAN) injection 4 mg  4 mg Intravenous Q4H PRN    pantoprazole (PROTONIX) EC tablet 40 mg  40 mg Oral Early Morning    tamsulosin (FLOMAX) capsule 0.4 mg  0.4 mg Oral Daily With Dinner    vancomycin (VANCOCIN) 1,250 mg in sodium chloride 0.9 % 250 mL IVPB  1,250 mg Intravenous Q12H       Imaging:  See above    Physical Exam:    General appearance: alert and oriented, in no acute distress  Skin: skin color, texture, turgor normal w/ exception of thick, brown scaling and wound on RLE  Neurologic: Grossly normal  Head: Normocephalic, without obvious abnormality, atraumatic  Lungs: clear to auscultation bilaterally  Heart: regular rate and rhythm  Abdomen: soft, non-tender; bowel sounds normal; no masses,  no organomegaly  Extremities:  warm, no cyanosis or clubbing, RLE lymphedema; L BKA    Wound/Incision:  R heel wound w/ drsg dry and intact.     Pulse exam:  Radial: Right: 2+ Left[de-identified] 2+  Popliteal: Right: doppler signal Left: doppler signal  DP: Right:  unable to assess secondary to LE drsg  Left:  SHELLY WALSH  PT: Right:  unable to assess secondary to LE drsg  Left:  SHELLY Fairchild [place], [place]  10/20/2023

## 2023-10-20 NOTE — ASSESSMENT & PLAN NOTE
Due to demand ischemia from sepsis  Echo pending for pre-op evaluation and audible murmur  Continue aspirin, lipitor, plavix

## 2023-10-21 LAB
ANION GAP SERPL CALCULATED.3IONS-SCNC: 6 MMOL/L
BASOPHILS # BLD AUTO: 0.03 THOUSANDS/ÂΜL (ref 0–0.1)
BASOPHILS NFR BLD AUTO: 0 % (ref 0–1)
BUN SERPL-MCNC: 16 MG/DL (ref 5–25)
CALCIUM SERPL-MCNC: 8.3 MG/DL (ref 8.4–10.2)
CHLORIDE SERPL-SCNC: 93 MMOL/L (ref 96–108)
CO2 SERPL-SCNC: 31 MMOL/L (ref 21–32)
CREAT SERPL-MCNC: 0.91 MG/DL (ref 0.6–1.3)
EOSINOPHIL # BLD AUTO: 0.01 THOUSAND/ÂΜL (ref 0–0.61)
EOSINOPHIL NFR BLD AUTO: 0 % (ref 0–6)
ERYTHROCYTE [DISTWIDTH] IN BLOOD BY AUTOMATED COUNT: 14.7 % (ref 11.6–15.1)
GFR SERPL CREATININE-BSD FRML MDRD: 88 ML/MIN/1.73SQ M
GLUCOSE SERPL-MCNC: 123 MG/DL (ref 65–140)
GLUCOSE SERPL-MCNC: 123 MG/DL (ref 65–140)
GLUCOSE SERPL-MCNC: 129 MG/DL (ref 65–140)
GLUCOSE SERPL-MCNC: 154 MG/DL (ref 65–140)
HCT VFR BLD AUTO: 38.7 % (ref 36.5–49.3)
HGB BLD-MCNC: 12.1 G/DL (ref 12–17)
IMM GRANULOCYTES # BLD AUTO: 0.03 THOUSAND/UL (ref 0–0.2)
IMM GRANULOCYTES NFR BLD AUTO: 0 % (ref 0–2)
LYMPHOCYTES # BLD AUTO: 0.57 THOUSANDS/ÂΜL (ref 0.6–4.47)
LYMPHOCYTES NFR BLD AUTO: 7 % (ref 14–44)
MCH RBC QN AUTO: 26.2 PG (ref 26.8–34.3)
MCHC RBC AUTO-ENTMCNC: 31.3 G/DL (ref 31.4–37.4)
MCV RBC AUTO: 84 FL (ref 82–98)
MONOCYTES # BLD AUTO: 0.67 THOUSAND/ÂΜL (ref 0.17–1.22)
MONOCYTES NFR BLD AUTO: 8 % (ref 4–12)
NEUTROPHILS # BLD AUTO: 7.19 THOUSANDS/ÂΜL (ref 1.85–7.62)
NEUTS SEG NFR BLD AUTO: 85 % (ref 43–75)
NRBC BLD AUTO-RTO: 0 /100 WBCS
PLATELET # BLD AUTO: 301 THOUSANDS/UL (ref 149–390)
PMV BLD AUTO: 8.8 FL (ref 8.9–12.7)
POTASSIUM SERPL-SCNC: 3.7 MMOL/L (ref 3.5–5.3)
RBC # BLD AUTO: 4.61 MILLION/UL (ref 3.88–5.62)
SODIUM SERPL-SCNC: 130 MMOL/L (ref 135–147)
WBC # BLD AUTO: 8.5 THOUSAND/UL (ref 4.31–10.16)

## 2023-10-21 PROCEDURE — 99232 SBSQ HOSP IP/OBS MODERATE 35: CPT | Performed by: SURGERY

## 2023-10-21 PROCEDURE — 82948 REAGENT STRIP/BLOOD GLUCOSE: CPT

## 2023-10-21 PROCEDURE — 85025 COMPLETE CBC W/AUTO DIFF WBC: CPT | Performed by: INTERNAL MEDICINE

## 2023-10-21 PROCEDURE — 99232 SBSQ HOSP IP/OBS MODERATE 35: CPT | Performed by: INTERNAL MEDICINE

## 2023-10-21 PROCEDURE — 99232 SBSQ HOSP IP/OBS MODERATE 35: CPT

## 2023-10-21 PROCEDURE — 80048 BASIC METABOLIC PNL TOTAL CA: CPT | Performed by: INTERNAL MEDICINE

## 2023-10-21 RX ADMIN — METRONIDAZOLE 500 MG: 500 INJECTION, SOLUTION INTRAVENOUS at 08:41

## 2023-10-21 RX ADMIN — INSULIN GLARGINE 30 UNITS: 100 INJECTION, SOLUTION SUBCUTANEOUS at 08:53

## 2023-10-21 RX ADMIN — FINASTERIDE 5 MG: 5 TABLET, FILM COATED ORAL at 08:41

## 2023-10-21 RX ADMIN — PANTOPRAZOLE SODIUM 40 MG: 40 TABLET, DELAYED RELEASE ORAL at 05:24

## 2023-10-21 RX ADMIN — INSULIN LISPRO 1 UNITS: 100 INJECTION, SOLUTION INTRAVENOUS; SUBCUTANEOUS at 12:28

## 2023-10-21 RX ADMIN — TAMSULOSIN HYDROCHLORIDE 0.4 MG: 0.4 CAPSULE ORAL at 15:44

## 2023-10-21 RX ADMIN — METRONIDAZOLE 500 MG: 500 INJECTION, SOLUTION INTRAVENOUS at 15:45

## 2023-10-21 RX ADMIN — INSULIN GLARGINE 30 UNITS: 100 INJECTION, SOLUTION SUBCUTANEOUS at 22:44

## 2023-10-21 RX ADMIN — METRONIDAZOLE 500 MG: 500 INJECTION, SOLUTION INTRAVENOUS at 22:45

## 2023-10-21 RX ADMIN — FUROSEMIDE 40 MG: 10 INJECTION, SOLUTION INTRAVENOUS at 08:41

## 2023-10-21 RX ADMIN — ASPIRIN 81 MG: 81 TABLET, COATED ORAL at 08:41

## 2023-10-21 RX ADMIN — CLOPIDOGREL BISULFATE 75 MG: 75 TABLET ORAL at 08:41

## 2023-10-21 RX ADMIN — CEFTRIAXONE 2000 MG: 2 INJECTION, SOLUTION INTRAVENOUS at 01:10

## 2023-10-21 RX ADMIN — ENOXAPARIN SODIUM 40 MG: 40 INJECTION SUBCUTANEOUS at 08:42

## 2023-10-21 RX ADMIN — ATORVASTATIN CALCIUM 80 MG: 80 TABLET, FILM COATED ORAL at 15:44

## 2023-10-21 NOTE — ASSESSMENT & PLAN NOTE
57 yo male former smoker (quit 1994) with hx of DM II, CAD S/P CABG w/ R GSV, L BKA (by Dr. Delmer Blank 8/3/18 for tissue loss), PAD, chronic R heel ulcer presented to [facility] on 10/19/23 w/ weakness, chills, nausea and vomiting. Pt admitted w/ worsening R foot wound. R foot xray shows large ulcer overlying plantar aspect of calcaneous. CT RLE shows severe diffuse subcutaneous edema without subcutaneous gas; (-) for OM. Pt seen in consult by podiatry for R heel wound. Per podiatry, limb salvage is unlikely and pt will need amputation of RLE. Cardiology consulted for operative risk stratification. Vascular surgery consulted for higher level amputation. Recommending above-knee amputation in setting of infection, severe lymphedema, skin changes, and existing contralateral amputation. Pt has not had a follow-up w/ vascular since 8/31/18. Home medications include ASA, plavix and lipitor. Cardiology consulted re: cardiac risk stratification and recommended 2D echo. Diagnostics:   -CT RLE 10/19/23: severe diffuse subcutaneous edema without subcutaneous gas; no OM. -Xray R foot 10/19/23: large ulcer overlying plantar aspect of calcaneous.   -ISAK 10/20/2023: R 0.95/ 7/--; Diffuse atherosclerotic disease, 50-75% prox SFA x 2; unable to visualize calf vessels due to edema, depth    Plan:  -Nonsalvageable RLE secondary to infected heal wound, per podiatry  -Sepsis bacteremia: BC (+) proteus; continue rocephin and flagyl per SLIM  -Plan for R AKA pending cardiology risk assessment  -Echocardiogram from 10/22: LVEF 47% w/ normal systolic function and mildly abnormal diastolic dysfunction  -Continue wound care per podiatry  -Continue ASA, plavix and lipitor  -Maintain good diabetes control  -Discussed w/ Dr. Corinne Wong Doctor

## 2023-10-21 NOTE — ASSESSMENT & PLAN NOTE
Lab Results   Component Value Date    HGBA1C 6.7 (A) 02/02/2023       Recent Labs     10/20/23  1640 10/20/23  2140 10/21/23  0732 10/21/23  1055   POCGLU 142* 155* 123 154*     Continue current regimen: Lantus 30 units BID and SSI  Due to decreased PO intake and acute illness, he is requiring less than his home regimen  Watch for hypo/hyperglycemia

## 2023-10-21 NOTE — ASSESSMENT & PLAN NOTE
Due to demand ischemia from sepsis  No ongoing CP  Echo pending for pre-op evaluation and audible murmur  Continue aspirin, plavix, lipitor

## 2023-10-21 NOTE — PROGRESS NOTES
[address] [facility]  Progress Note  Name: Javon Koch  MRN: [id number]  Unit/Bed#: E4 -01 I Date of Admission: 10/19/2023   Date of Service: 10/21/2023 I Hospital Day: 2    Assessment/Plan   PAD (peripheral artery disease) [facility])  Assessment & Plan  57 yo male former smoker (quit 1994) with DM II, CAD S/P CABG w/ R GSV, L BKA (Marcos 8/3/2018 tissue loss), PAD, chronic R heel ulcer who presented to [facility] on 10/19/23 w/ weakness, chills, nausea and vomiting. Pt admitted w/ worsening R foot wound. R foot xray shows large ulcer overlying plantar aspect of calcaneous. CT RLE shows severe diffuse subcutaneous edema without subcutaneous gas; no OM. Pt seen in consult by podiatry for R heel wound. Per podiatry, limb salvage is unlikely and pt will need amputation of RLE. Cardiology consulted for operative risk stratification. 2-layer Moura compression dressing placed by podiatry to assist w/ RLE edema. Vascular surgery consulted for higher level amputation. Recommending above-knee amputation in setting of infection, severe lymphedema, skin changes, contralateral amputation. Note, patient with PAD underwent L BKA by Dr. Artem Knutson on 8/3/18 secondary to LLE tissue loss and unsuccessful L popliteal recanalization on 3/19/18. Pt has not had a follow-up w/ vascular since 8/31/18. Regular medications include: aspirin, clopidogrel and atorvastatin. Diagnostics:   -CT RLE 10/19/23: severe diffuse subcutaneous edema without subcutaneous gas; no OM. -Xray R foot 10/19/23: large ulcer overlying plantar aspect of calcaneous.   -ISKA 10/20/2023: R 0.95/ 7/--; Diffuse atherosclerotic disease, 50-75% prox SFA x 2; unable to visualize calf vessels due to edema, depth    Labs 10/21: WBC 8.5, Hbg 12.1, creat 0.91  Bcx: 2/2 positive for gram negative rods     Plan:  -Nonsalvageable RLE 22/2 infected heal wound as per podiatry  -Sepsis on antibiotics  -Blood cultures revealing gram negative rods  -Antibiotics on metronidazole as per SLIM  -Recommended for R AKA  -Echocardiogram and cardiology risk assessment are pending  -Continue wound care per podiatry  -We discussed that he will have cardiac eval and echo as pre-op for recommended R AKA  -Continue with aspirin 81, clopidogrel 75 and atorvastatin 80  -Maintain good diabetes control  -Case d/w and seen with Dr. Mariah Bland       Subjective:    -Patient reports that he is not feeling like he can eat. -Uncomfortable laying in the bed which is bothering the buttock and would like to get out to a chair; nursing was notified that he needs repositioning and would like to sit in chair    -No fevers (T97.8)    Vitals:  /58 (BP Location: Left arm)   Pulse 74   Temp 97.8 °F (36.6 °C) (Temporal)   Resp 18   Ht 6' 1" (1.854 m)   Wt 113 kg (250 lb)   SpO2 96%   BMI 32.98 kg/m²     I/Os:  I/O last 3 completed shifts:  In: -   Out: 900 [Urine:900]  No intake/output data recorded.         Lab Results and Cultures:   CBC with diff:   Lab Results   Component Value Date    WBC 8.50 10/21/2023    HGB 12.1 10/21/2023    HCT 38.7 10/21/2023    MCV 84 10/21/2023     10/21/2023    RBC 4.61 10/21/2023    MCH 26.2 (L) 10/21/2023    MCHC 31.3 (L) 10/21/2023    RDW 14.7 10/21/2023    MPV 8.8 (L) 10/21/2023    NRBC 0 10/21/2023   ,   BMP/CMP:  Lab Results   Component Value Date    SODIUM 130 (L) 10/21/2023    K 3.7 10/21/2023    K 5.2 09/13/2016    CL 93 (L) 10/21/2023    CL 96 (L) 09/13/2016    CO2 31 10/21/2023    CO2 29 03/28/2018    BUN 16 10/21/2023    BUN 19 09/13/2016    CREATININE 0.91 10/21/2023    CREATININE 1.03 09/13/2016    GLUCOSE 165 (H) 03/28/2018    GLUCOSE 215 (H) 09/13/2016    CALCIUM 8.3 (L) 10/21/2023    CALCIUM 9.3 09/13/2016    AST 15 10/20/2023    AST 22 09/13/2016    ALT 11 10/20/2023    ALT 29 09/13/2016    ALKPHOS 52 10/20/2023    ALKPHOS 53 09/13/2016    PROT 7.4 09/13/2016    BILITOT 0.4 09/13/2016    EGFR 88 10/21/2023   ,   Lipid Panel:   Lab Results Component Value Date    CHOL 214 (H) 09/13/2016   ,   Coags:   Lab Results   Component Value Date    PTT 22 (L) 10/19/2023    INR 1.08 10/19/2023   ,     Blood Culture:   Lab Results   Component Value Date    BLOODCX Diphtheroids 08/29/2023   ,   Urinalysis:   Lab Results   Component Value Date    COLORU Yellow 10/19/2023    CLARITYU Clear 10/19/2023    SPECGRAV 1.018 10/19/2023    PHUR 7.5 10/19/2023    PHUR 7.5 05/23/2018    LEUKOCYTESUR Negative 10/19/2023    NITRITE Negative 10/19/2023    GLUCOSEU Negative 10/19/2023    KETONESU Negative 10/19/2023    BILIRUBINUR Negative 10/19/2023    BLOODU Negative 10/19/2023   ,   Urine Culture: No results found for: "URINECX",   Wound Culure:   Lab Results   Component Value Date    WOUNDCULT 3+ Growth of Escherichia coli (A) 08/29/2023    WOUNDCULT 3+ Growth of Proteus mirabilis (A) 08/29/2023    WOUNDCULT 3+ Growth of 08/29/2023       Medications:  Current Facility-Administered Medications   Medication Dose Route Frequency    acetaminophen (TYLENOL) tablet 650 mg  650 mg Oral Q6H PRN    aspirin (ECOTRIN LOW STRENGTH) EC tablet 81 mg  81 mg Oral Daily    atorvastatin (LIPITOR) tablet 80 mg  80 mg Oral Daily With Dinner    cefTRIAXone (ROCEPHIN) IVPB (premix in dextrose) 2,000 mg 50 mL  2,000 mg Intravenous Q24H    clopidogrel (PLAVIX) tablet 75 mg  75 mg Oral Daily    enoxaparin (LOVENOX) subcutaneous injection 40 mg  40 mg Subcutaneous Daily    finasteride (PROSCAR) tablet 5 mg  5 mg Oral Daily    furosemide (LASIX) injection 40 mg  40 mg Intravenous BID (diuretic)    insulin glargine (LANTUS) subcutaneous injection 30 Units 0.3 mL  30 Units Subcutaneous BID    insulin lispro (HumaLOG) 100 units/mL subcutaneous injection 1-5 Units  1-5 Units Subcutaneous TID AC    metroNIDAZOLE (FLAGYL) IVPB (premix) 500 mg 100 mL  500 mg Intravenous Q8H    ondansetron (ZOFRAN) injection 4 mg  4 mg Intravenous Q4H PRN    pantoprazole (PROTONIX) EC tablet 40 mg  40 mg Oral Early Morning tamsulosin (FLOMAX) capsule 0.4 mg  0.4 mg Oral Daily With Dinner       Imaging:    ISAK 10/20/2023  Indications:  Right Atherosclerosis with Gangrene [I70.269]. Patient with history of PAD and left leg BKA is admitted with right heel  ulceration/gangrene. Operative History:  2018-08-03 Left Below knee amputation  2018-03-28 CABG x 3  Risk Factors  The patient has history of Obesity, HTN, Diabetes (Yes), Hyperlipidemia, PAD,  CAD and previous smoking (quit >10yrs ago). Clinical  Right Pressure:  130/ mm Hg, Left Pressure:  129/ mm Hg. FINDINGS:     Right                  Impression  PSV (cm/s)  EDV    Common Femoral Artery                     159   28    Prox Profunda                             125   17    Prox SFA               50-75%             307   38    Mid SFA                                    98   21    Dist SFA                                   58   15    Proximal Pop           50-75%             195   62    Distal Pop                                 59   19             CONCLUSION:  Impression:  RIGHT LOWER LIMB:  Diffuse atherosclerotic arterial disease noted, with a 50-75% stenosis in the  proximal superficial femoral artery and a 50-75% stenosis in the proximal  superficial femoral artery. Unable to visualize calf vessels due to edema, depth, induration and thickened,  scaly skin. Ankle/Brachial index: supra-normal, consistent with poorly compressible  vessels. Prior . 95 (2020 study). Metatarsal pressure of  7 mm Hg, however, may be unreliable. Great toe pressure of unobtainable, due to flatline ppg waveform. PVR/ PPG tracings are dampened at the ankle, severely attenuated in the  metatarsal/toe. LEFT LOWER LIMB:  BKA  Compared to previous study on 4/18/2023, there are changes and findings as  described above.          Physical Exam:    General: Awake alert and oriented x3, no apparent distress  CV: Regular rate rhythm S1-S2  Respiratory: Respirations nonlabored; overall CTAB  Abdominal: obese,soft  Extremities: R LE lymphedema; R foot bandaged - c/d/I - marked dry, scaly, thickened skin and heel ulceration; foul oder    Wound/Incision:            Pulse exam:  Radial: Right: 2+ Left[de-identified] 2+    Ladonna Wright, PA-C  10/21/2023

## 2023-10-21 NOTE — UTILIZATION REVIEW
Date: 10/21/23    Day 3: Has surpassed a 2nd midnight with active treatments and services, which include Continue ceftriaxone and flagyl. Continue aspirin, plavix, lipitor. Continue lasix. SSI. BLD CX.wound care. Continue Local care and pain control. Not feeling well. Appetite is poor. No CP. No SOB. See initial review completed by Rosalio Stephenson on 10/2.

## 2023-10-21 NOTE — PLAN OF CARE
Problem: Potential for Falls  Goal: Patient will remain free of falls  Description: INTERVENTIONS:  - Educate patient/family on patient safety including physical limitations  - Instruct patient to call for assistance with activity   - Consult OT/PT to assist with strengthening/mobility   - Keep Call bell within reach  - Keep bed low and locked with side rails adjusted as appropriate  - Keep care items and personal belongings within reach  - Initiate and maintain comfort rounds  - Make Fall Risk Sign visible to staff  - Offer Toileting every  Hours, in advance of need  - Initiate/Maintain alarm  - Obtain necessary fall risk management equipment:   - Apply yellow socks and bracelet for high fall risk patients  - Consider moving patient to room near nurses station  Outcome: Progressing     Problem: MOBILITY - ADULT  Goal: Maintain or return to baseline ADL function  Description: INTERVENTIONS:  -  Assess patient's ability to carry out ADLs; assess patient's baseline for ADL function and identify physical deficits which impact ability to perform ADLs (bathing, care of mouth/teeth, toileting, grooming, dressing, etc.)  - Assess/evaluate cause of self-care deficits   - Assess range of motion  - Assess patient's mobility; develop plan if impaired  - Assess patient's need for assistive devices and provide as appropriate  - Encourage maximum independence but intervene and supervise when necessary  - Involve family in performance of ADLs  - Assess for home care needs following discharge   - Consider OT consult to assist with ADL evaluation and planning for discharge  - Provide patient education as appropriate  Outcome: Progressing  Goal: Maintains/Returns to pre admission functional level  Description: INTERVENTIONS:  - Perform BMAT or MOVE assessment daily.   - Set and communicate daily mobility goal to care team and patient/family/caregiver.    - Collaborate with rehabilitation services on mobility goals if consulted  - Perform Range of Motion  times a day. - Reposition patient every  hours.   - Dangle patient  times a day  - Stand patient  times a day  - Ambulate patient  times a day  - Out of bed to chair  times a day   - Out of bed for meals  times a day  - Out of bed for toileting  - Record patient progress and toleration of activity level   Outcome: Progressing     Problem: PAIN - ADULT  Goal: Verbalizes/displays adequate comfort level or baseline comfort level  Description: Interventions:  - Encourage patient to monitor pain and request assistance  - Assess pain using appropriate pain scale  - Administer analgesics based on type and severity of pain and evaluate response  - Implement non-pharmacological measures as appropriate and evaluate response  - Consider cultural and social influences on pain and pain management  - Notify physician/advanced practitioner if interventions unsuccessful or patient reports new pain  Outcome: Progressing     Problem: INFECTION - ADULT  Goal: Absence or prevention of progression during hospitalization  Description: INTERVENTIONS:  - Assess and monitor for signs and symptoms of infection  - Monitor lab/diagnostic results  - Monitor all insertion sites, i.e. indwelling lines, tubes, and drains  - Monitor endotracheal if appropriate and nasal secretions for changes in amount and color  - [place] appropriate cooling/warming therapies per order  - Administer medications as ordered  - Instruct and encourage patient and family to use good hand hygiene technique  - Identify and instruct in appropriate isolation precautions for identified infection/condition  Outcome: Progressing     Problem: SAFETY ADULT  Goal: Patient will remain free of falls  Description: INTERVENTIONS:  - Educate patient/family on patient safety including physical limitations  - Instruct patient to call for assistance with activity   - Consult OT/PT to assist with strengthening/mobility   - Keep Call bell within reach  - Keep bed low and locked with side rails adjusted as appropriate  - Keep care items and personal belongings within reach  - Initiate and maintain comfort rounds  - Make Fall Risk Sign visible to staff  - Offer Toileting every  Hours, in advance of need  - Initiate/Maintain alarm  - Obtain necessary fall risk management equipment:   - Apply yellow socks and bracelet for high fall risk patients  - Consider moving patient to room near nurses station  Outcome: Progressing  Goal: Maintain or return to baseline ADL function  Description: INTERVENTIONS:  -  Assess patient's ability to carry out ADLs; assess patient's baseline for ADL function and identify physical deficits which impact ability to perform ADLs (bathing, care of mouth/teeth, toileting, grooming, dressing, etc.)  - Assess/evaluate cause of self-care deficits   - Assess range of motion  - Assess patient's mobility; develop plan if impaired  - Assess patient's need for assistive devices and provide as appropriate  - Encourage maximum independence but intervene and supervise when necessary  - Involve family in performance of ADLs  - Assess for home care needs following discharge   - Consider OT consult to assist with ADL evaluation and planning for discharge  - Provide patient education as appropriate  Outcome: Progressing  Goal: Maintains/Returns to pre admission functional level  Description: INTERVENTIONS:  - Perform BMAT or MOVE assessment daily.   - Set and communicate daily mobility goal to care team and patient/family/caregiver. - Collaborate with rehabilitation services on mobility goals if consulted  - Perform Range of Motion  times a day. - Reposition patient every  hours.   - Dangle patient  times a day  - Stand patient  times a day  - Ambulate patient  times a day  - Out of bed to chair  times a day   - Out of bed for meals  times a day  - Out of bed for toileting  - Record patient progress and toleration of activity level   Outcome: Progressing Problem: DISCHARGE PLANNING  Goal: Discharge to home or other facility with appropriate resources  Description: INTERVENTIONS:  - Identify barriers to discharge w/patient and caregiver  - Arrange for needed discharge resources and transportation as appropriate  - Identify discharge learning needs (meds, wound care, etc.)  - Arrange for interpretive services to assist at discharge as needed  - Refer to Case Management Department for coordinating discharge planning if the patient needs post-hospital services based on physician/advanced practitioner order or complex needs related to functional status, cognitive ability, or social support system  Outcome: Progressing     Problem: Knowledge Deficit  Goal: Patient/family/caregiver demonstrates understanding of disease process, treatment plan, medications, and discharge instructions  Description: Complete learning assessment and assess knowledge base.   Interventions:  - Provide teaching at level of understanding  - Provide teaching via preferred learning methods  Outcome: Progressing     Problem: Potential for Falls  Goal: Patient will remain free of falls  Description: INTERVENTIONS:  - Educate patient/family on patient safety including physical limitations  - Instruct patient to call for assistance with activity   - Consult OT/PT to assist with strengthening/mobility   - Keep Call bell within reach  - Keep bed low and locked with side rails adjusted as appropriate  - Keep care items and personal belongings within reach  - Initiate and maintain comfort rounds  - Make Fall Risk Sign visible to staff  - Offer Toileting every  Hours, in advance of need  - Initiate/Maintain alarm  - Obtain necessary fall risk management equipment:   - Apply yellow socks and bracelet for high fall risk patients  - Consider moving patient to room near nurses station  Outcome: Progressing     Problem: MOBILITY - ADULT  Goal: Maintain or return to baseline ADL function  Description: INTERVENTIONS:  -  Assess patient's ability to carry out ADLs; assess patient's baseline for ADL function and identify physical deficits which impact ability to perform ADLs (bathing, care of mouth/teeth, toileting, grooming, dressing, etc.)  - Assess/evaluate cause of self-care deficits   - Assess range of motion  - Assess patient's mobility; develop plan if impaired  - Assess patient's need for assistive devices and provide as appropriate  - Encourage maximum independence but intervene and supervise when necessary  - Involve family in performance of ADLs  - Assess for home care needs following discharge   - Consider OT consult to assist with ADL evaluation and planning for discharge  - Provide patient education as appropriate  Outcome: Progressing  Goal: Maintains/Returns to pre admission functional level  Description: INTERVENTIONS:  - Perform BMAT or MOVE assessment daily.   - Set and communicate daily mobility goal to care team and patient/family/caregiver. - Collaborate with rehabilitation services on mobility goals if consulted  - Perform Range of Motion  times a day. - Reposition patient every  hours.   - Dangle patient  times a day  - Stand patient  times a day  - Ambulate patient  times a day  - Out of bed to chair  times a day   - Out of bed for meals  times a day  - Out of bed for toileting  - Record patient progress and toleration of activity level   Outcome: Progressing     Problem: PAIN - ADULT  Goal: Verbalizes/displays adequate comfort level or baseline comfort level  Description: Interventions:  - Encourage patient to monitor pain and request assistance  - Assess pain using appropriate pain scale  - Administer analgesics based on type and severity of pain and evaluate response  - Implement non-pharmacological measures as appropriate and evaluate response  - Consider cultural and social influences on pain and pain management  - Notify physician/advanced practitioner if interventions unsuccessful or patient reports new pain  Outcome: Progressing     Problem: INFECTION - ADULT  Goal: Absence or prevention of progression during hospitalization  Description: INTERVENTIONS:  - Assess and monitor for signs and symptoms of infection  - Monitor lab/diagnostic results  - Monitor all insertion sites, i.e. indwelling lines, tubes, and drains  - Monitor endotracheal if appropriate and nasal secretions for changes in amount and color  - [place] appropriate cooling/warming therapies per order  - Administer medications as ordered  - Instruct and encourage patient and family to use good hand hygiene technique  - Identify and instruct in appropriate isolation precautions for identified infection/condition  Outcome: Progressing     Problem: SAFETY ADULT  Goal: Patient will remain free of falls  Description: INTERVENTIONS:  - Educate patient/family on patient safety including physical limitations  - Instruct patient to call for assistance with activity   - Consult OT/PT to assist with strengthening/mobility   - Keep Call bell within reach  - Keep bed low and locked with side rails adjusted as appropriate  - Keep care items and personal belongings within reach  - Initiate and maintain comfort rounds  - Make Fall Risk Sign visible to staff  - Offer Toileting every  Hours, in advance of need  - Initiate/Maintain alarm  - Obtain necessary fall risk management equipment:   - Apply yellow socks and bracelet for high fall risk patients  - Consider moving patient to room near nurses station  Outcome: Progressing  Goal: Maintain or return to baseline ADL function  Description: INTERVENTIONS:  -  Assess patient's ability to carry out ADLs; assess patient's baseline for ADL function and identify physical deficits which impact ability to perform ADLs (bathing, care of mouth/teeth, toileting, grooming, dressing, etc.)  - Assess/evaluate cause of self-care deficits   - Assess range of motion  - Assess patient's mobility; develop plan if impaired  - Assess patient's need for assistive devices and provide as appropriate  - Encourage maximum independence but intervene and supervise when necessary  - Involve family in performance of ADLs  - Assess for home care needs following discharge   - Consider OT consult to assist with ADL evaluation and planning for discharge  - Provide patient education as appropriate  Outcome: Progressing  Goal: Maintains/Returns to pre admission functional level  Description: INTERVENTIONS:  - Perform BMAT or MOVE assessment daily.   - Set and communicate daily mobility goal to care team and patient/family/caregiver. - Collaborate with rehabilitation services on mobility goals if consulted  - Perform Range of Motion  times a day. - Reposition patient every  hours.   - Dangle patient  times a day  - Stand patient  times a day  - Ambulate patient  times a day  - Out of bed to chair  times a day   - Out of bed for meals  times a day  - Out of bed for toileting  - Record patient progress and toleration of activity level   Outcome: Progressing     Problem: DISCHARGE PLANNING  Goal: Discharge to home or other facility with appropriate resources  Description: INTERVENTIONS:  - Identify barriers to discharge w/patient and caregiver  - Arrange for needed discharge resources and transportation as appropriate  - Identify discharge learning needs (meds, wound care, etc.)  - Arrange for interpretive services to assist at discharge as needed  - Refer to Case Management Department for coordinating discharge planning if the patient needs post-hospital services based on physician/advanced practitioner order or complex needs related to functional status, cognitive ability, or social support system  Outcome: Progressing     Problem: Knowledge Deficit  Goal: Patient/family/caregiver demonstrates understanding of disease process, treatment plan, medications, and discharge instructions  Description: Complete learning assessment and assess knowledge base.   Interventions:  - Provide teaching at level of understanding  - Provide teaching via preferred learning methods  Outcome: Progressing

## 2023-10-21 NOTE — PROGRESS NOTES
[facility]  Progress Note  Name: Pallavi Laguna  MRN: [id number]  Unit/Bed#: E4 -01 I Date of Admission: 10/19/2023   Date of Service: 10/21/2023 I Hospital Day: 2    Assessment/Plan   * Sepsis [facility])  Assessment & Plan  POA   Due to right heel wound with gangrene and gram negative bacteremia  See plan for individual problems below    Bacteremia due to Proteus species  Assessment & Plan  From foot wound/gangrene  Continue Ceftriaxone 2g Q24    Diabetic ulcer of right heel ([address])  Assessment & Plan  For right BKA vs AKA next week per vascular surgery  Continue ceftriaxone and flagyl  Continue Local care and pain control    Elevated troponin  Assessment & Plan  Due to demand ischemia from sepsis  No ongoing CP  Echo pending for pre-op evaluation and audible murmur  Continue aspirin, plavix, lipitor    Type 2 diabetes mellitus with diabetic neuropathy, with long-term current use of insulin [facility])  Assessment & Plan  Lab Results   Component Value Date    HGBA1C 6.7 (A) 02/02/2023       Recent Labs     10/20/23  1640 10/20/23  2140 10/21/23  0732 10/21/23  1055   POCGLU 142* 155* 123 154*     Continue current regimen: Lantus 30 units BID and SSI  Due to decreased PO intake and acute illness, he is requiring less than his home regimen  Watch for hypo/hyperglycemia    Hyperlipidemia  Assessment & Plan  Continue lipitor 80 mg daily    Chronic heart failure with preserved ejection fraction (HCC)  Assessment & Plan  Wt Readings from Last 3 Encounters:   10/21/23 113 kg (250 lb)   09/02/23 118 kg (259 lb 7.7 oz)   07/31/23 118 kg (260 lb)     Continue lasix  Echo pending   Cardiology follow up         VTE Pharmacologic Prophylaxis: VTE Score: 4 Moderate Risk (Score 3-4) - Pharmacological DVT Prophylaxis Ordered: enoxaparin (Lovenox). Patient Centered Rounds: I performed bedside rounds with nursing staff today.     Current Length of Stay: 2 day(s)  Current Patient Status: Inpatient Certification Statement: The patient will continue to require additional inpatient hospital stay due to sepsis, surgery  Discharge Plan: Anticipate discharge in >72 hrs to rehab facility. Code Status: Level 1 - Full Code    Subjective:   Not feeling well  Appetite is poor  No CP  No SOB    Objective:     Vitals:   Temp (24hrs), Av.3 °F (36.8 °C), Min:97.8 °F (36.6 °C), Max:98.6 °F (37 °C)    Temp:  [97.8 °F (36.6 °C)-98.6 °F (37 °C)] 97.8 °F (36.6 °C)  HR:  [74-81] 74  Resp:  [18] 18  BP: (112-138)/(55-59) 112/58  SpO2:  [92 %-96 %] 96 %  Body mass index is 32.98 kg/m². Input and Output Summary (last 24 hours):   No intake or output data in the 24 hours ending 10/21/23 1223    Physical Exam:   Physical Exam  Vitals reviewed. Constitutional:       Appearance: He is obese. HENT:      Head: Normocephalic and atraumatic. Nose: No congestion or rhinorrhea. Eyes:      General: No scleral icterus. Cardiovascular:      Rate and Rhythm: Regular rhythm. Heart sounds: Murmur heard. Pulmonary:      Breath sounds: No wheezing, rhonchi or rales. Abdominal:      Palpations: Abdomen is soft. Tenderness: There is no abdominal tenderness. There is no guarding. Musculoskeletal:         General: Deformity present. Cervical back: Neck supple. Comments: Foul smelling right heel wound   Neurological:      Motor: No weakness. Gait: Gait normal.   Psychiatric:      Comments: Mood is depressed      Additional Data:     Labs:  Results from last 7 days   Lab Units 10/21/23  0617 10/19/23  1833 10/19/23  1250   WBC Thousand/uL 8.50   < > 12.77*   HEMOGLOBIN g/dL 12.1   < > 12.5   HEMATOCRIT % 38.7   < > 40.3   PLATELETS Thousands/uL 301   < > 348   BANDS PCT %  --   --  3   NEUTROS PCT % 85*   < >  --    LYMPHS PCT % 7*   < >  --    LYMPHO PCT %  --   --  2*   MONOS PCT % 8   < >  --    MONO PCT %  --   --  3*   EOS PCT % 0   < > 0    < > = values in this interval not displayed. Results from last 7 days   Lab Units 10/21/23  0617 10/20/23  0505   SODIUM mmol/L 130* 129*   POTASSIUM mmol/L 3.7 4.0   CHLORIDE mmol/L 93* 96   CO2 mmol/L 31 27   BUN mg/dL 16 13   CREATININE mg/dL 0.91 0.82   ANION GAP mmol/L 6 6   CALCIUM mg/dL 8.3* 8.1*   ALBUMIN g/dL  --  2.9*   TOTAL BILIRUBIN mg/dL  --  0.57   ALK PHOS U/L  --  52   ALT U/L  --  11   AST U/L  --  15   GLUCOSE RANDOM mg/dL 129 118     Results from last 7 days   Lab Units 10/19/23  1250   INR  1.08     Results from last 7 days   Lab Units 10/21/23  1055 10/21/23  0732 10/20/23  2140 10/20/23  1640 10/20/23  1118 10/20/23  0848 10/19/23  2106 10/19/23  1303   POC GLUCOSE mg/dl 154* 123 155* 142* 138 123 96 146*         Results from last 7 days   Lab Units 10/20/23  0505 10/19/23  1833 10/19/23  1250   LACTIC ACID mmol/L  --  1.3 1.8   PROCALCITONIN ng/ml 0.19 0.11 0.08       Lines/Drains:  Invasive Devices       Peripheral Intravenous Line  Duration             Peripheral IV 10/19/23 Right Antecubital 1 day                      Telemetry:  Telemetry Orders (From admission, onward)               24 Hour Telemetry Monitoring  Continuous x 24 Hours (Telem)           Question:  Reason for 24 Hour Telemetry  Answer:  Decompensated CHF- and any one of the following: continuous diuretic infusion or total diuretic dose >200 mg daily, associated electrolyte derangement (I.e. K < 3.0), ionotropic drip (continuous infusion), hx of ventricular arrhythmia, or new EF < 35%                              Imaging: Reviewed radiology reports from this admission including: CT    Recent Cultures (last 7 days):   Results from last 7 days   Lab Units 10/19/23  1305 10/19/23  1250   BLOOD CULTURE  Non lactose fermenting gram negative ilda* Proteus species*   GRAM STAIN RESULT  Gram negative rods* Gram negative rods*       Last 24 Hours Medication List:   Current Facility-Administered Medications   Medication Dose Route Frequency Provider Last Rate acetaminophen  650 mg Oral Q6H PRN Martha Patel MD      aspirin  81 mg Oral Daily Martha Patel MD      atorvastatin  80 mg Oral Daily With Hai Austin MD      cefTRIAXone  2,000 mg Intravenous Q24H Ritika Aldea, DO 2,000 mg (10/21/23 0110)    clopidogrel  75 mg Oral Daily Martha Patel MD      enoxaparin  40 mg Subcutaneous Daily Martha Patel MD      finasteride  5 mg Oral Daily Martha Patel MD      furosemide  40 mg Intravenous BID (diuretic) Martha Patel MD      insulin glargine  30 Units Subcutaneous BID Martha Patel MD      insulin lispro  1-5 Units Subcutaneous TID AC Adriano Turcios MD      metroNIDAZOLE  500 mg Intravenous Q8H Ritika Aldea,  mg (10/21/23 0841)    ondansetron  4 mg Intravenous Q4H PRN Martha Patel MD      pantoprazole  40 mg Oral Early Morning Martha Patel MD      tamsulosin  0.4 mg Oral Daily With Hai Austin MD          Today, Patient Was Seen By: Adriano Turcios MD    **Please Note: This note may have been constructed using a voice recognition system. **

## 2023-10-21 NOTE — PLAN OF CARE
Problem: Potential for Falls  Goal: Patient will remain free of falls  Description: INTERVENTIONS:  - Educate patient/family on patient safety including physical limitations  - Instruct patient to call for assistance with activity   - Consult OT/PT to assist with strengthening/mobility   - Keep Call bell within reach  - Keep bed low and locked with side rails adjusted as appropriate  - Keep care items and personal belongings within reach  - Initiate and maintain comfort rounds  - Make Fall Risk Sign visible to staff  - Offer Toileting every 2 Hours, in advance of need  - Initiate/Maintain bed alarm  - Obtain necessary fall risk management equipment:   - Apply yellow socks and bracelet for high fall risk patients  - Consider moving patient to room near nurses station  Outcome: Progressing     Problem: MOBILITY - ADULT  Goal: Maintain or return to baseline ADL function  Description: INTERVENTIONS:  -  Assess patient's ability to carry out ADLs; assess patient's baseline for ADL function and identify physical deficits which impact ability to perform ADLs (bathing, care of mouth/teeth, toileting, grooming, dressing, etc.)  - Assess/evaluate cause of self-care deficits   - Assess range of motion  - Assess patient's mobility; develop plan if impaired  - Assess patient's need for assistive devices and provide as appropriate  - Encourage maximum independence but intervene and supervise when necessary  - Involve family in performance of ADLs  - Assess for home care needs following discharge   - Consider OT consult to assist with ADL evaluation and planning for discharge  - Provide patient education as appropriate  Outcome: Progressing  Goal: Maintains/Returns to pre admission functional level  Description: INTERVENTIONS:  - Perform BMAT or MOVE assessment daily.   - Set and communicate daily mobility goal to care team and patient/family/caregiver.    - Collaborate with rehabilitation services on mobility goals if consulted  - Perform Range of Motion 3 times a day. - Reposition patient every 2 hours.   - Dangle patient 3 times a day  - Stand patient 3 times a day  - Ambulate patient 3 times a day  - Out of bed to chair 3 times a day   - Out of bed for meals 3 times a day  - Out of bed for toileting  - Record patient progress and toleration of activity level   Outcome: Progressing     Problem: PAIN - ADULT  Goal: Verbalizes/displays adequate comfort level or baseline comfort level  Description: Interventions:  - Encourage patient to monitor pain and request assistance  - Assess pain using appropriate pain scale  - Administer analgesics based on type and severity of pain and evaluate response  - Implement non-pharmacological measures as appropriate and evaluate response  - Consider cultural and social influences on pain and pain management  - Notify physician/advanced practitioner if interventions unsuccessful or patient reports new pain  Outcome: Progressing     Problem: INFECTION - ADULT  Goal: Absence or prevention of progression during hospitalization  Description: INTERVENTIONS:  - Assess and monitor for signs and symptoms of infection  - Monitor lab/diagnostic results  - Monitor all insertion sites, i.e. indwelling lines, tubes, and drains  - Monitor endotracheal if appropriate and nasal secretions for changes in amount and color  - [place] appropriate cooling/warming therapies per order  - Administer medications as ordered  - Instruct and encourage patient and family to use good hand hygiene technique  - Identify and instruct in appropriate isolation precautions for identified infection/condition  Outcome: Progressing     Problem: SAFETY ADULT  Goal: Patient will remain free of falls  Description: INTERVENTIONS:  - Educate patient/family on patient safety including physical limitations  - Instruct patient to call for assistance with activity   - Consult OT/PT to assist with strengthening/mobility   - Keep Call bell within reach  - Keep bed low and locked with side rails adjusted as appropriate  - Keep care items and personal belongings within reach  - Initiate and maintain comfort rounds  - Make Fall Risk Sign visible to staff  - Offer Toileting every 2 Hours, in advance of need  - Initiate/Maintain bed alarm  - Obtain necessary fall risk management equipment:   - Apply yellow socks and bracelet for high fall risk patients  - Consider moving patient to room near nurses station  Outcome: Progressing  Goal: Maintain or return to baseline ADL function  Description: INTERVENTIONS:  -  Assess patient's ability to carry out ADLs; assess patient's baseline for ADL function and identify physical deficits which impact ability to perform ADLs (bathing, care of mouth/teeth, toileting, grooming, dressing, etc.)  - Assess/evaluate cause of self-care deficits   - Assess range of motion  - Assess patient's mobility; develop plan if impaired  - Assess patient's need for assistive devices and provide as appropriate  - Encourage maximum independence but intervene and supervise when necessary  - Involve family in performance of ADLs  - Assess for home care needs following discharge   - Consider OT consult to assist with ADL evaluation and planning for discharge  - Provide patient education as appropriate  Outcome: Progressing  Goal: Maintains/Returns to pre admission functional level  Description: INTERVENTIONS:  - Perform BMAT or MOVE assessment daily.   - Set and communicate daily mobility goal to care team and patient/family/caregiver. - Collaborate with rehabilitation services on mobility goals if consulted  - Perform Range of Motion 3 times a day. - Reposition patient every 2 hours.   - Dangle patient 3 times a day  - Stand patient 3 times a day  - Ambulate patient 3 times a day  - Out of bed to chair 3 times a day   - Out of bed for meals 3 times a day  - Out of bed for toileting  - Record patient progress and toleration of activity level   Outcome: Progressing     Problem: DISCHARGE PLANNING  Goal: Discharge to home or other facility with appropriate resources  Description: INTERVENTIONS:  - Identify barriers to discharge w/patient and caregiver  - Arrange for needed discharge resources and transportation as appropriate  - Identify discharge learning needs (meds, wound care, etc.)  - Arrange for interpretive services to assist at discharge as needed  - Refer to Case Management Department for coordinating discharge planning if the patient needs post-hospital services based on physician/advanced practitioner order or complex needs related to functional status, cognitive ability, or social support system  Outcome: Progressing

## 2023-10-21 NOTE — ASSESSMENT & PLAN NOTE
57 yo male former smoker (quit 1994) with DM II, CAD S/P CABG w/ R GSV, L BKA ([facility] 8/3/2018 tissue loss), PAD, chronic R heel ulcer who presented to [facility] on 10/19/23 w/ weakness, chills, nausea and vomiting. Pt admitted w/ worsening R foot wound. R foot xray shows large ulcer overlying plantar aspect of calcaneous. CT RLE shows severe diffuse subcutaneous edema without subcutaneous gas; no OM. Pt seen in consult by podiatry for R heel wound. Per podiatry, limb salvage is unlikely and pt will need amputation of RLE. Cardiology consulted for operative risk stratification. 2-layer [place] compression dressing placed by podiatry to assist w/ RLE edema. Vascular surgery consulted for higher level amputation. Recommending above-knee amputation in setting of infection, severe lymphedema, skin changes, contralateral amputation. Note, patient with PAD underwent L BKA by Dr. Astrid Linda on 8/3/18 secondary to LLE tissue loss and unsuccessful L popliteal recanalization on 3/19/18. Pt has not had a follow-up w/ vascular since 8/31/18. Regular medications include: aspirin, clopidogrel and atorvastatin. Diagnostics:   -CT RLE 10/19/23: severe diffuse subcutaneous edema without subcutaneous gas; no OM. -Xray R foot 10/19/23: large ulcer overlying plantar aspect of calcaneous. -ISAK 10/20/2023: R 0.95/ 7/--; Diffuse atherosclerotic disease, 50-75% prox SFA x 2; unable to visualize calf vessels due to edema, depth    Labs (10/20):   WBC 8.12 (12.77)  Procalcitonin 1.19 (0.08)  Bcx: 2/2 positive for gram negative rods     Plan:  -Nonsalvageable RLE 22/2 infected heal wound as per podiatry  -Sepsis on antibiotics  -Blood cultures revealing gram negative rods  -Antibiotics on metronidazole as per SLIM  -Recommended for R AKA  -Echocardiogram and cardiology risk assessment are pending  -Continue wound care per podiatry  -We discussed that he will have cardiac eval and echo as pre-op for recommended R AKA  -Continue with aspirin 81, clopidogrel 75 and atorvastatin 80  -Maintain good diabetes control  -Case d/w and seen with Dr. Sandrita May

## 2023-10-21 NOTE — PROGRESS NOTES
Progress Note - Cardiology   Kelly De La Cruz 58 y.o. male MRN: [id number]  Unit/Bed#: E4 -01 Encounter: [id number]      Assessment/Recommendations/Discussion:   Elevated troponin, suspect secondary to demand from worsening infection of right foot wound on top of underlying already known coronary artery disease Resaid multivessel disease diagnosed 5 years ago now status post CABG with type 2 diabetes and peripheral arterial disease  Coronary artery disease status post CABG in 2018  Type 2 diabetes  Peripheral arterial disease  Chronic right heel ulcer  Left below-knee amputation  Obesity  Ambulatory dysfunction, uses prosthetic left leg after amputation  Cardiac murmur, left sternal border    PLAN  He notes no angina type symptoms at all. No chest pain or significant shortness of breath. Overall he feels "not well" his blood cultures positive for gram-negative rods related to his right foot infection which has been deemed not salvageable and he has been recommended for right AKA  Given his positive troponins, history of CAD, will check echo to evaluate underlying LV function    With his history of diabetes PAD coronary disease with CABG 5 years ago, ambulatory dysfunction where he needs to use prosthetic left leg for amputation, obesity, and positive troponins he will be elevated risk for any type of surgery but fortunately does not appear to be symptomatic with any type of angina type symptoms right now. We will check echo as described above to assess LV function      Subjective:   HPI  Overall feels just sick but denies any chest pain shortness of breath palpitations      Review of Systems: As noted in HPI. Rest of ROS is negative.     Vitals:   /58 (BP Location: Left arm)   Pulse 74   Temp 97.8 °F (36.6 °C) (Temporal)   Resp 18   Ht 6' 1" (1.854 m)   Wt 113 kg (250 lb)   SpO2 96%   BMI 32.98 kg/m²   I/O         10/19 0701  10/20 0700 10/20 0701  10/21 0700 10/21 0701  10/22 0700    IV Piggyback 1050      Total Intake(mL/kg) 1050 (9.3)      Urine (mL/kg/hr) 700 550 (0.2)     Total Output 700 550     Net +350 -550                  Weight (last 2 days)       Date/Time Weight    10/21/23 0600 113 (250)    10/20/23 0538 113 (248.46)    10/19/23 1819 116 (255.51)    10/19/23 1242 120 (265.21)            Physical Exam   Constitutional: awake, alert and oriented, in no acute distress, no obvious deformities, obese male  Head: Normocephalic, without obvious abnormality, atraumatic  Eyes: conjunctivae clear and moist. Sclera anicteric. No xanthelasmas. Pupils equal bilaterally. Extraocular motions are full. Ear nose mouth and throat: ears are symmetrical bilaterally, hearing appears to be equal bilaterally, no nasal discharge or epistaxis, oropharynx is clear with moist mucous membranes  Neck: Trachea is midline, neck is supple, no thyromegaly or significant lymphadenopathy, there is full range of motion.   Lungs: clear to auscultation bilaterally, no wheezes, no rales, no rhonchi, no accessory muscle use, breathing is nonlabored  Heart: regular rate and rhythm, S1, S2 normal, no murmur, no click, no rub and no gallop, remedy wound is noted, left lower extremity potation is noted  Abdomen: Obese, soft, non-tender; bowel sounds normal; no masses, no organomegaly  Psychiatric: Patient is oriented to time, place, person, mood/affect is negative for depression, anxiety, agitation, appears to have appropriate insight  Skin: Right lower extremity wound with dressing in place    TELEMETRY:   Lab Results:  Results from last 7 days   Lab Units 10/21/23  0617   WBC Thousand/uL 8.50   HEMOGLOBIN g/dL 12.1   HEMATOCRIT % 38.7   PLATELETS Thousands/uL 301     Results from last 7 days   Lab Units 10/21/23  0617 10/20/23  0505   POTASSIUM mmol/L 3.7 4.0   CHLORIDE mmol/L 93* 96   CO2 mmol/L 31 27   BUN mg/dL 16 13   CREATININE mg/dL 0.91 0.82   CALCIUM mg/dL 8.3* 8.1*   ALK PHOS U/L  --  52   ALT U/L  --  11   AST U/L  --  15 Results from last 7 days   Lab Units 10/21/23  0617   POTASSIUM mmol/L 3.7   CHLORIDE mmol/L 93*   CO2 mmol/L 31   BUN mg/dL 16   CREATININE mg/dL 0.91   CALCIUM mg/dL 8.3*           Medications:    Current Facility-Administered Medications:     acetaminophen (TYLENOL) tablet 650 mg, 650 mg, Oral, Q6H PRN, Anu Renteria MD    aspirin (ECOTRIN LOW STRENGTH) EC tablet 81 mg, 81 mg, Oral, Daily, Anu Renteria MD, 81 mg at 10/21/23 0841    atorvastatin (LIPITOR) tablet 80 mg, 80 mg, Oral, Daily With Robert Armando MD, 80 mg at 10/20/23 1653    cefTRIAXone (ROCEPHIN) IVPB (premix in dextrose) 2,000 mg 50 mL, 2,000 mg, Intravenous, Q24H, Ritika Chacon DO, Last Rate: 100 mL/hr at 10/21/23 0110, 2,000 mg at 10/21/23 0110    clopidogrel (PLAVIX) tablet 75 mg, 75 mg, Oral, Daily, Anu Renteria MD, 75 mg at 10/21/23 0841    enoxaparin (LOVENOX) subcutaneous injection 40 mg, 40 mg, Subcutaneous, Daily, Anu Renteria MD, 40 mg at 10/21/23 0842    finasteride (PROSCAR) tablet 5 mg, 5 mg, Oral, Daily, Anu Renteria MD, 5 mg at 10/21/23 0841    furosemide (LASIX) injection 40 mg, 40 mg, Intravenous, BID (diuretic), Anu Renteria MD, 40 mg at 10/21/23 0841    insulin glargine (LANTUS) subcutaneous injection 30 Units 0.3 mL, 30 Units, Subcutaneous, BID, Anu Renteria MD, 30 Units at 10/21/23 0853    insulin lispro (HumaLOG) 100 units/mL subcutaneous injection 1-5 Units, 1-5 Units, Subcutaneous, TID AC **AND** Fingerstick Glucose (POCT), , , TID AC, Sandra Lala MD    metroNIDAZOLE (FLAGYL) IVPB (premix) 500 mg 100 mL, 500 mg, Intravenous, Q8H, Riitka Chacon DO, Last Rate: 200 mL/hr at 10/21/23 0841, 500 mg at 10/21/23 0841    ondansetron (ZOFRAN) injection 4 mg, 4 mg, Intravenous, Q4H PRN, Anu Renteria MD, 4 mg at 10/20/23 1338    pantoprazole (PROTONIX) EC tablet 40 mg, 40 mg, Oral, Early Morning, Anu Renteria MD, 40 mg at 10/21/23 0524    tamsulosin (FLOMAX) capsule 0.4 mg, 0.4 mg, Oral, Daily With Dinner, Gustabo Cranker, MD, 0.4 mg at 10/20/23 1653    Portions of the record may have been created with voice recognition software. Occasional wrong word or "sound a like" substitutions may have occurred due to the inherent limitations of voice recognition software. Read the chart carefully and recognize, using context, where substitutions have occurred.     Stefany Martinez DO, [facility]  10/21/2023 11:59 AM

## 2023-10-21 NOTE — ASSESSMENT & PLAN NOTE
For right BKA vs AKA next week per vascular surgery  Continue ceftriaxone and flagyl  Continue Local care and pain control

## 2023-10-21 NOTE — ASSESSMENT & PLAN NOTE
Wt Readings from Last 3 Encounters:   10/21/23 113 kg (250 lb)   09/02/23 118 kg (259 lb 7.7 oz)   07/31/23 118 kg (260 lb)     Continue lasix  Echo pending   Cardiology follow up

## 2023-10-22 ENCOUNTER — APPOINTMENT (INPATIENT)
Dept: NON INVASIVE DIAGNOSTICS | Facility: HOSPITAL | Age: 63
DRG: 616 | End: 2023-10-22
Payer: COMMERCIAL

## 2023-10-22 LAB
AORTIC ROOT: 3.2 CM
APICAL FOUR CHAMBER EJECTION FRACTION: 61 %
E WAVE DECELERATION TIME: 239 MS
E/A RATIO: 0.64
FRACTIONAL SHORTENING: 34 (ref 28–44)
GLUCOSE SERPL-MCNC: 100 MG/DL (ref 65–140)
GLUCOSE SERPL-MCNC: 107 MG/DL (ref 65–140)
GLUCOSE SERPL-MCNC: 113 MG/DL (ref 65–140)
GLUCOSE SERPL-MCNC: 127 MG/DL (ref 65–140)
GLUCOSE SERPL-MCNC: 91 MG/DL (ref 65–140)
INTERVENTRICULAR SEPTUM IN DIASTOLE (PARASTERNAL SHORT AXIS VIEW): 1.4 CM
INTERVENTRICULAR SEPTUM: 1.4 CM (ref 0.6–1.1)
LAAS-AP2: 13.9 CM2
LAAS-AP4: 14.5 CM2
LEFT ATRIUM AREA SYSTOLE SINGLE PLANE A4C: 14.6 CM2
LEFT ATRIUM SIZE: 1.6 CM
LEFT ATRIUM VOLUME (MOD BIPLANE): 34 ML
LEFT ATRIUM VOLUME INDEX (MOD BIPLANE): 14.3 ML/M2
LEFT INTERNAL DIMENSION IN SYSTOLE: 2.5 CM (ref 2.1–4)
LEFT VENTRICULAR INTERNAL DIMENSION IN DIASTOLE: 3.8 CM (ref 3.5–6)
LEFT VENTRICULAR POSTERIOR WALL IN END DIASTOLE: 1.4 CM
LEFT VENTRICULAR STROKE VOLUME: 42 ML
LVSV (TEICH): 42 ML
MV E'TISSUE VEL-SEP: 4 CM/S
MV PEAK A VEL: 0.55 M/S
MV PEAK E VEL: 35 CM/S
MV STENOSIS PRESSURE HALF TIME: 69 MS
MV VALVE AREA P 1/2 METHOD: 3.19
RIGHT ATRIUM AREA SYSTOLE A4C: 15.7 CM2
RIGHT VENTRICLE ID DIMENSION: 4.3 CM
SL CV LEFT ATRIUM LENGTH A2C: 4.8 CM
SL CV LV EF: 61
SL CV PED ECHO LEFT VENTRICLE DIASTOLIC VOLUME (MOD BIPLANE) 2D: 64 ML
SL CV PED ECHO LEFT VENTRICLE SYSTOLIC VOLUME (MOD BIPLANE) 2D: 22 ML
TRICUSPID ANNULAR PLANE SYSTOLIC EXCURSION: 1.6 CM

## 2023-10-22 PROCEDURE — C8929 TTE W OR WO FOL WCON,DOPPLER: HCPCS

## 2023-10-22 PROCEDURE — 99232 SBSQ HOSP IP/OBS MODERATE 35: CPT | Performed by: INTERNAL MEDICINE

## 2023-10-22 PROCEDURE — 82948 REAGENT STRIP/BLOOD GLUCOSE: CPT

## 2023-10-22 PROCEDURE — 93306 TTE W/DOPPLER COMPLETE: CPT

## 2023-10-22 RX ORDER — LANOLIN ALCOHOL/MO/W.PET/CERES
6 CREAM (GRAM) TOPICAL
Status: DISCONTINUED | OUTPATIENT
Start: 2023-10-22 | End: 2023-10-23

## 2023-10-22 RX ADMIN — ONDANSETRON 4 MG: 2 INJECTION INTRAMUSCULAR; INTRAVENOUS at 07:08

## 2023-10-22 RX ADMIN — METRONIDAZOLE 500 MG: 500 INJECTION, SOLUTION INTRAVENOUS at 07:59

## 2023-10-22 RX ADMIN — INSULIN GLARGINE 30 UNITS: 100 INJECTION, SOLUTION SUBCUTANEOUS at 21:43

## 2023-10-22 RX ADMIN — PANTOPRAZOLE SODIUM 40 MG: 40 TABLET, DELAYED RELEASE ORAL at 05:54

## 2023-10-22 RX ADMIN — Medication 6 MG: at 21:42

## 2023-10-22 RX ADMIN — PERFLUTREN 0.4 ML/MIN: 6.52 INJECTION, SUSPENSION INTRAVENOUS at 15:48

## 2023-10-22 RX ADMIN — ASPIRIN 81 MG: 81 TABLET, COATED ORAL at 08:31

## 2023-10-22 RX ADMIN — METRONIDAZOLE 500 MG: 500 INJECTION, SOLUTION INTRAVENOUS at 15:26

## 2023-10-22 RX ADMIN — ATORVASTATIN CALCIUM 80 MG: 80 TABLET, FILM COATED ORAL at 17:19

## 2023-10-22 RX ADMIN — ONDANSETRON 4 MG: 2 INJECTION INTRAMUSCULAR; INTRAVENOUS at 01:12

## 2023-10-22 RX ADMIN — FINASTERIDE 5 MG: 5 TABLET, FILM COATED ORAL at 08:31

## 2023-10-22 RX ADMIN — ENOXAPARIN SODIUM 40 MG: 40 INJECTION SUBCUTANEOUS at 08:32

## 2023-10-22 RX ADMIN — FUROSEMIDE 40 MG: 10 INJECTION, SOLUTION INTRAVENOUS at 08:45

## 2023-10-22 RX ADMIN — CEFTRIAXONE 2000 MG: 2 INJECTION, SOLUTION INTRAVENOUS at 03:04

## 2023-10-22 RX ADMIN — TAMSULOSIN HYDROCHLORIDE 0.4 MG: 0.4 CAPSULE ORAL at 17:19

## 2023-10-22 RX ADMIN — CLOPIDOGREL BISULFATE 75 MG: 75 TABLET ORAL at 08:31

## 2023-10-22 RX ADMIN — INSULIN GLARGINE 30 UNITS: 100 INJECTION, SOLUTION SUBCUTANEOUS at 08:43

## 2023-10-22 NOTE — ASSESSMENT & PLAN NOTE
POA   Due to right heel wound with gangrene and gram negative bacteremia  Hemodynamically stable  See plan for individual problems below

## 2023-10-22 NOTE — ASSESSMENT & PLAN NOTE
Due to demand ischemia from sepsis  Preop echocardiogram pending  Currently without chest pain  Continue aspirin Plavix and Lipitor

## 2023-10-22 NOTE — CASE MANAGEMENT
Case Management Assessment & Discharge Planning Note    Patient name Michael Hopson  Location [address] [place] 456/E4 MS 26-* MRN [id number]  : 1960 Date 10/22/2023       Current Admission Date: 10/19/2023  Current Admission Diagnosis:Sepsis [facility])   Patient Active Problem List    Diagnosis Date Noted    Sepsis ([address]) 10/20/2023    Bacteremia due to Proteus species 10/20/2023    Respiratory insufficiency 10/19/2023    Elevated troponin 10/19/2023    Loose stools 2023    Diabetic ulcer of right heel ([address]) 2023    Lymphedema in adult patient 2023    Cellulitis of right ankle 2023    Non-pressure chronic ulcer of right calf with fat layer exposed ([address]) 2023    Embolism and thrombosis of arteries of the lower extremities ([address]) 10/29/2020    Lymphedema of right lower extremity 10/29/2020    Venous stasis dermatitis of right lower extremity 10/29/2020    Abdominal distension 10/29/2020    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    Nodular rash 2020    CAD (coronary artery disease) 2019    Phantom limb syndrome without pain ([address]) 10/04/2018    Obstructive sleep apnea 2018    Hyponatremia 2018    Diabetic autonomic neuropathy associated with type 2 diabetes mellitus ([address]) 2018    S/P CABG x 3 2018    Chronic heart failure with preserved ejection fraction ([address]) 2018    Hypertensive heart disease with congestive heart failure (HCC)     Triple vessel coronary artery disease 2018    Diabetic gastroparesis  2018    Class 2 severe obesity due to excess calories with serious comorbidity and body mass index (BMI) of 35.0 to 35.9 in adult  2018    Orthostatic hypotension 2018    PAD (peripheral artery disease) ([address]) 2018    S/P BKA (below knee amputation), left ([address]) 2018    Anxiety and depression 2017    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 2017    Vitamin D deficiency 09/20/2016    Hyperlipidemia 02/11/2015    Type 2 diabetes mellitus with diabetic neuropathy, with long-term current use of insulin ([address]) 11/06/2014      LOS (days): 3  Geometric Mean LOS (GMLOS) (days): 5.10  Days to GMLOS:2.2     OBJECTIVE:    Risk of Unplanned Readmission Score: 19.38         Current admission status: Inpatient       Preferred Pharmacy:   [address] [place] [address] [address] [address]  Phone: [phone] Fax: [phone]    [facility] ([address] [facility]  [address] [facility]  [address] [facility] [address] [address]  Phone: [phone] Fax: [facility], [address] [facility]  [address] [facility]  Phone: [phone] Fax: [phone]    Primary Care Provider: Lidia Altman DO    Primary Insurance: Nat Jauregui [facility] REP  Secondary Insurance:     ASSESSMENT:  [address] [facility], [address] [facility] 22 Representative - Spouse   Primary Phone: [phone] (Mobile)                 Readmission Root Cause  30 Day Readmission: No    Patient Information  Admitted from[de-identified] Home  Mental Status: Alert  During Assessment patient was accompanied by: Not accompanied during assessment  Assessment information provided by[de-identified] Patient  Primary Caregiver: Family  Caregiver's Name[de-identified] Dinorah Martinez (Spouse) [phone] (Mobile)  Caregiver's Relationship to Patient[de-identified] Family Member  Support Systems: Spouse/significant other  In the last 12 months, was there a time when you were not able to pay the mortgage or rent on time?: No  In the last 12 months, how many places have you lived?: 1  In the last 12 months, was there a time when you did not have a steady place to sleep or slept in a shelter (including now)?: No  Homeless/housing insecurity resource given?: N/A  Living Arrangements: Lives w/ Spouse/significant other    Activities of Daily Living Prior to Admission  Functional Status: Total dependent  Completes ADLs independently?: No  Level of ADL dependence: Assistance  Ambulates independently?: No  Level of ambulatory dependence: Total Dependent  Does patient use assisted devices?: Yes  Assisted Devices (DME) used:  Wheelchair  Does patient currently own DME?: Yes  What DME does the patient currently own?: Wheelchair  Does patient have a history of Outpatient Therapy (PT/OT)?: Yes  Does the patient have a history of Short-Term Rehab?: Yes ([facility]), [facility])  Does patient have a history of HHC?: Yes  Does patient currently have [facility]?: Yes    Current Home Health Care  Type of Current Home Care Services: Nurse visit  Current Home Health Agency[de-identified] [address] [place] [address] Provider[de-identified] PCP    Patient Information Continued  Income Source: SSI/SSD  Within the past 12 months, you worried that your food would run out before you got the money to buy more.: Never true  Within the past 12 months, the food you bought just didn't last and you didn't have money to get more.: Never true  Food insecurity resource given?: N/A         Means of Transportation  Means of Transport to Appts[de-identified] Other (Comment) (insurance helps with transport)  In the past 12 months, has lack of transportation kept you from medical appointments or from getting medications?: No  In the past 12 months, has lack of transportation kept you from meetings, work, or from getting things needed for daily living?: No  Was application for public transport provided?: N/A        DISCHARGE DETAILS:    Discharge planning discussed with[de-identified] Patient  Freedom of Choice: Yes        Were Treatment Team discharge recommendations reviewed with patient/caregiver?: Yes  Did patient/caregiver verbalize understanding of patient care needs?: Yes       Contacts  Patient Contacts: Jocelyne Arias (Spouse)  [phone] (M)  Reason/Outcome: Continuity of Care, Emergency Contact      Additional Comments: CM spoke with patient. He is wheelchair bound at baseline due to left below the knee amputation. CM awaiting PT/OT recommendation. Cm educated patient that we will send referrals to [facility] once PT/OT sees him.

## 2023-10-22 NOTE — ASSESSMENT & PLAN NOTE
Lab Results   Component Value Date    HGBA1C 6.7 (A) 02/02/2023       Recent Labs     10/21/23  1055 10/21/23  1642 10/21/23  2110 10/22/23  0740   POCGLU 154* 123 127 113     Continue current regimen: Lantus 30 units BID and SSI  Due to decreased p.o. intake and acute illness, he is requiring less than his home regimen  Watch for hypoglycemia adjust insulin as needed

## 2023-10-22 NOTE — ASSESSMENT & PLAN NOTE
Non-salvageable   For right AKA next week per vascular surgery, timing to be determined  Pre-op echocardiogram is pending  Continue ceftriaxone and flagyl  Continue Local care and pain control

## 2023-10-22 NOTE — ASSESSMENT & PLAN NOTE
Patient follows with vascular surgery outpatient  Continue aspirin, Plavix, statin  He has diffuse disease proximally so vascular is recommending an AKA  Doppler: "Diffuse atherosclerotic arterial disease noted, with a 50-75% stenosis in the  proximal superficial femoral artery and a 50-75% stenosis in the proximal superficial femoral artery"

## 2023-10-22 NOTE — PROGRESS NOTES
[address] [facility]  Progress Note  Name: Zackery Hanna  MRN: [id number]  Unit/Bed#: E4 -01 I Date of Admission: 10/19/2023   Date of Service: 10/22/2023 I Hospital Day: 3    Assessment/Plan   * Sepsis [facility])  Assessment & Plan  POA   Due to right heel wound with gangrene and gram negative bacteremia  Hemodynamically stable  See plan for individual problems below    Bacteremia due to Proteus species  Assessment & Plan  From foot wound/gangrene  Continue Ceftriaxone 2g Q24  Anticipating right AKA during this admission    Diabetic ulcer of right heel (HCC)  Assessment & Plan  Non-salvageable   For right AKA next week per vascular surgery, timing to be determined  Pre-op echocardiogram is pending  Continue ceftriaxone and flagyl  Continue Local care and pain control    Elevated troponin  Assessment & Plan  Due to demand ischemia from sepsis  Preop echocardiogram pending  Currently without chest pain  Continue aspirin Plavix and Lipitor    Type 2 diabetes mellitus with diabetic neuropathy, with long-term current use of insulin [facility])  Assessment & Plan  Lab Results   Component Value Date    HGBA1C 6.7 (A) 02/02/2023       Recent Labs     10/21/23  1055 10/21/23  1642 10/21/23  2110 10/22/23  0740   POCGLU 154* 123 127 113     Continue current regimen: Lantus 30 units BID and SSI  Due to decreased p.o. intake and acute illness, he is requiring less than his home regimen  Watch for hypoglycemia adjust insulin as needed    Hyperlipidemia  Assessment & Plan  Continue Lipitor 80 mg daily    PAD (peripheral artery disease) ([address])  Assessment & Plan  Patient follows with vascular surgery outpatient  Continue aspirin, Plavix, statin  He has diffuse disease proximally so vascular is recommending an AKA  Doppler: "Diffuse atherosclerotic arterial disease noted, with a 50-75% stenosis in the  proximal superficial femoral artery and a 50-75% stenosis in the proximal superficial femoral artery"         VTE Pharmacologic Prophylaxis: VTE Score: 4 Moderate Risk (Score 3-4) - Pharmacological DVT Prophylaxis Ordered: enoxaparin (Lovenox). Patient Centered Rounds: I performed bedside rounds with nursing staff today. Education and Discussions with Family / Patient: Updated  (wife) via phone. Current Length of Stay: 3 day(s)  Current Patient Status: Inpatient   Certification Statement: The patient will continue to require additional inpatient hospital stay due to sepsis and pending right BKA  Discharge Plan: Anticipate discharge in >72 hrs to rehab facility. Code Status: Level 1 - Full Code    Subjective:   Malaise  Poor appetite  Feels better on the recliner rather than in bed  Denies CP or SOB    Objective:     Vitals:   Temp (24hrs), Av.8 °F (36.6 °C), Min:97.3 °F (36.3 °C), Max:98.3 °F (36.8 °C)    Temp:  [97.3 °F (36.3 °C)-98.3 °F (36.8 °C)] 97.3 °F (36.3 °C)  HR:  [74-75] 75  Resp:  [18-22] 20  BP: ()/(51-67) 111/67  SpO2:  [94 %-100 %] 96 %  Body mass index is 32.98 kg/m². Input and Output Summary (last 24 hours): Intake/Output Summary (Last 24 hours) at 10/22/2023 1014  Last data filed at 10/22/2023 0120  Gross per 24 hour   Intake --   Output 850 ml   Net -850 ml       Physical Exam:   Physical Exam  Vitals reviewed. Constitutional:       Appearance: He is obese. He is ill-appearing. HENT:      Head: Normocephalic and atraumatic. Nose: No congestion or rhinorrhea. Eyes:      General: No scleral icterus. Cardiovascular:      Rate and Rhythm: Normal rate and regular rhythm. Heart sounds: Murmur heard. Pulmonary:      Breath sounds: No wheezing or rhonchi. Abdominal:      General: There is no distension. Tenderness: There is no abdominal tenderness. Musculoskeletal:         General: Deformity present. Cervical back: Neck supple.       Comments: Left bka   Skin:     Comments: Foul smelling right heel wound   Neurological:      Mental Status: He is oriented to person, place, and time. Psychiatric:      Comments: Mood is depressed        Additional Data:     Labs:  Results from last 7 days   Lab Units 10/21/23  0617 10/19/23  1833 10/19/23  1250   WBC Thousand/uL 8.50   < > 12.77*   HEMOGLOBIN g/dL 12.1   < > 12.5   HEMATOCRIT % 38.7   < > 40.3   PLATELETS Thousands/uL 301   < > 348   BANDS PCT %  --   --  3   NEUTROS PCT % 85*   < >  --    LYMPHS PCT % 7*   < >  --    LYMPHO PCT %  --   --  2*   MONOS PCT % 8   < >  --    MONO PCT %  --   --  3*   EOS PCT % 0   < > 0    < > = values in this interval not displayed.      Results from last 7 days   Lab Units 10/21/23  0617 10/20/23  0505   SODIUM mmol/L 130* 129*   POTASSIUM mmol/L 3.7 4.0   CHLORIDE mmol/L 93* 96   CO2 mmol/L 31 27   BUN mg/dL 16 13   CREATININE mg/dL 0.91 0.82   ANION GAP mmol/L 6 6   CALCIUM mg/dL 8.3* 8.1*   ALBUMIN g/dL  --  2.9*   TOTAL BILIRUBIN mg/dL  --  0.57   ALK PHOS U/L  --  52   ALT U/L  --  11   AST U/L  --  15   GLUCOSE RANDOM mg/dL 129 118     Results from last 7 days   Lab Units 10/19/23  1250   INR  1.08     Results from last 7 days   Lab Units 10/22/23  0740 10/21/23  2110 10/21/23  1642 10/21/23  1055 10/21/23  0732 10/20/23  2140 10/20/23  1640 10/20/23  1118 10/20/23  0848 10/19/23  2106 10/19/23  1303   POC GLUCOSE mg/dl 113 127 123 154* 123 155* 142* 138 123 96 146*         Results from last 7 days   Lab Units 10/20/23  0505 10/19/23  1833 10/19/23  1250   LACTIC ACID mmol/L  --  1.3 1.8   PROCALCITONIN ng/ml 0.19 0.11 0.08       Lines/Drains:  Invasive Devices       Peripheral Intravenous Line  Duration             Peripheral IV 10/22/23 Left;Ventral (anterior) Forearm <1 day                      Telemetry:  Telemetry Orders (From admission, onward)               24 Hour Telemetry Monitoring  Continuous x 24 Hours (Telem)           Question:  Reason for 24 Hour Telemetry  Answer:  Decompensated CHF- and any one of the following: continuous diuretic infusion or total diuretic dose >200 mg daily, associated electrolyte derangement (I.e. K < 3.0), ionotropic drip (continuous infusion), hx of ventricular arrhythmia, or new EF < 35%                                Imaging: Reviewed radiology reports from this admission including: ultrasound(s) and CT    Recent Cultures (last 7 days):   Results from last 7 days   Lab Units 10/19/23  1305 10/19/23  1250   BLOOD CULTURE  Proteus mirabilis* Proteus mirabilis*   GRAM STAIN RESULT  Gram negative rods* Gram negative rods*       Last 24 Hours Medication List:   Current Facility-Administered Medications   Medication Dose Route Frequency Provider Last Rate    acetaminophen  650 mg Oral Q6H PRN Nakia Longoria MD      aspirin  81 mg Oral Daily Nakia Longoria MD      atorvastatin  80 mg Oral Daily With Den Giordano MD      cefTRIAXone  2,000 mg Intravenous Q24H Ritika Aldea, DO 2,000 mg (10/22/23 0304)    clopidogrel  75 mg Oral Daily Nakia Longoria MD      enoxaparin  40 mg Subcutaneous Daily Nakia Longoria MD      finasteride  5 mg Oral Daily Nakia Longoria MD      furosemide  40 mg Intravenous BID (diuretic) Nakia Longoria MD      insulin glargine  30 Units Subcutaneous BID Nakia Longoria MD      insulin lispro  1-5 Units Subcutaneous TID AC Sierra Hopson MD      metroNIDAZOLE  500 mg Intravenous Q8H Ritika Aldea,  mg (10/22/23 5619)    ondansetron  4 mg Intravenous Q4H PRN Nakia Longoria MD      pantoprazole  40 mg Oral Early Morning Nakia Longoria MD      tamsulosin  0.4 mg Oral Daily With Den Giordano MD          Today, Patient Was Seen By: Sierra Hopson MD    **Please Note: This note may have been constructed using a voice recognition system. **

## 2023-10-22 NOTE — PLAN OF CARE
Problem: Potential for Falls  Goal: Patient will remain free of falls  Description: INTERVENTIONS:  - Educate patient/family on patient safety including physical limitations  - Instruct patient to call for assistance with activity   - Consult OT/PT to assist with strengthening/mobility   - Keep Call bell within reach  - Keep bed low and locked with side rails adjusted as appropriate  - Keep care items and personal belongings within reach  - Initiate and maintain comfort rounds  - Make Fall Risk Sign visible to staff  - Offer Toileting every  Hours, in advance of need  - Initiate/Maintain alarm  - Obtain necessary fall risk management equipment:   - Apply yellow socks and bracelet for high fall risk patients  - Consider moving patient to room near nurses station  Outcome: Progressing     Problem: MOBILITY - ADULT  Goal: Maintain or return to baseline ADL function  Description: INTERVENTIONS:  -  Assess patient's ability to carry out ADLs; assess patient's baseline for ADL function and identify physical deficits which impact ability to perform ADLs (bathing, care of mouth/teeth, toileting, grooming, dressing, etc.)  - Assess/evaluate cause of self-care deficits   - Assess range of motion  - Assess patient's mobility; develop plan if impaired  - Assess patient's need for assistive devices and provide as appropriate  - Encourage maximum independence but intervene and supervise when necessary  - Involve family in performance of ADLs  - Assess for home care needs following discharge   - Consider OT consult to assist with ADL evaluation and planning for discharge  - Provide patient education as appropriate  Outcome: Progressing  Goal: Maintains/Returns to pre admission functional level  Description: INTERVENTIONS:  - Perform BMAT or MOVE assessment daily.   - Set and communicate daily mobility goal to care team and patient/family/caregiver.    - Collaborate with rehabilitation services on mobility goals if consulted  - Perform Range of Motion times a day. - Reposition patient every  hours.   - Dangle patient  times a day  - Stand patient  times a day  - Ambulate patient  times a day  - Out of bed to chair  times a day   - Out of bed for meals  times a day  - Out of bed for toileting  - Record patient progress and toleration of activity level   Outcome: Progressing     Problem: PAIN - ADULT  Goal: Verbalizes/displays adequate comfort level or baseline comfort level  Description: Interventions:  - Encourage patient to monitor pain and request assistance  - Assess pain using appropriate pain scale  - Administer analgesics based on type and severity of pain and evaluate response  - Implement non-pharmacological measures as appropriate and evaluate response  - Consider cultural and social influences on pain and pain management  - Notify physician/advanced practitioner if interventions unsuccessful or patient reports new pain  Outcome: Progressing     Problem: INFECTION - ADULT  Goal: Absence or prevention of progression during hospitalization  Description: INTERVENTIONS:  - Assess and monitor for signs and symptoms of infection  - Monitor lab/diagnostic results  - Monitor all insertion sites, i.e. indwelling lines, tubes, and drains  - Monitor endotracheal if appropriate and nasal secretions for changes in amount and color  - [place] appropriate cooling/warming therapies per order  - Administer medications as ordered  - Instruct and encourage patient and family to use good hand hygiene technique  - Identify and instruct in appropriate isolation precautions for identified infection/condition  Outcome: Progressing     Problem: SAFETY ADULT  Goal: Patient will remain free of falls  Description: INTERVENTIONS:  - Educate patient/family on patient safety including physical limitations  - Instruct patient to call for assistance with activity   - Consult OT/PT to assist with strengthening/mobility   - Keep Call bell within reach  - Keep bed low and locked with side rails adjusted as appropriate  - Keep care items and personal belongings within reach  - Initiate and maintain comfort rounds  - Make Fall Risk Sign visible to staff  - Offer Toileting every  Hours, in advance of need  - Initiate/Maintain arm  - Obtain necessary fall risk management equipment:   - Apply yellow socks and bracelet for high fall risk patients  - Consider moving patient to room near nurses station  Outcome: Progressing  Goal: Maintain or return to baseline ADL function  Description: INTERVENTIONS:  -  Assess patient's ability to carry out ADLs; assess patient's baseline for ADL function and identify physical deficits which impact ability to perform ADLs (bathing, care of mouth/teeth, toileting, grooming, dressing, etc.)  - Assess/evaluate cause of self-care deficits   - Assess range of motion  - Assess patient's mobility; develop plan if impaired  - Assess patient's need for assistive devices and provide as appropriate  - Encourage maximum independence but intervene and supervise when necessary  - Involve family in performance of ADLs  - Assess for home care needs following discharge   - Consider OT consult to assist with ADL evaluation and planning for discharge  - Provide patient education as appropriate  Outcome: Progressing  Goal: Maintains/Returns to pre admission functional level  Description: INTERVENTIONS:  - Perform BMAT or MOVE assessment daily.   - Set and communicate daily mobility goal to care team and patient/family/caregiver. - Collaborate with rehabilitation services on mobility goals if consulted  - Perform Range of Motion  times a day. - Reposition patient every  hours.   - Dangle patient  times a day  - Stand patient  times a day  - Ambulate patient  times a day  - Out of bed to chair  times a day   - Out of bed for meals  times a day  - Out of bed for toileting  - Record patient progress and toleration of activity level   Outcome: Progressing     Problem: DISCHARGE PLANNING  Goal: Discharge to home or other facility with appropriate resources  Description: INTERVENTIONS:  - Identify barriers to discharge w/patient and caregiver  - Arrange for needed discharge resources and transportation as appropriate  - Identify discharge learning needs (meds, wound care, etc.)  - Arrange for interpretive services to assist at discharge as needed  - Refer to Case Management Department for coordinating discharge planning if the patient needs post-hospital services based on physician/advanced practitioner order or complex needs related to functional status, cognitive ability, or social support system  Outcome: Progressing     Problem: Knowledge Deficit  Goal: Patient/family/caregiver demonstrates understanding of disease process, treatment plan, medications, and discharge instructions  Description: Complete learning assessment and assess knowledge base.   Interventions:  - Provide teaching at level of understanding  - Provide teaching via preferred learning methods  Outcome: Progressing

## 2023-10-22 NOTE — UTILIZATION REVIEW
Cornelio Little, RN  Registered Nurse  Specialty: Utilization Review     Utilization Review      Addendum     Date of Service: 10/20/2023 12:32 PM     Expand All Collapse All    Initial Clinical Review     Admission: Date/Time/Statement:   Admission Orders (From admission, onward)          Ordered         10/19/23 1632   INPATIENT ADMISSION  Once                                     Orders Placed This Encounter   Procedures    INPATIENT ADMISSION       Standing Status:   Standing       Number of Occurrences:   1       Order Specific Question:   Level of Care       Answer:   Med Surg [16]       Order Specific Question:   Estimated length of stay       Answer:   More than 2 Midnights       Order Specific Question:   Certification       Answer:   I certify that inpatient services are medically necessary for this patient for a duration of greater than two midnights. See H&P and MD Progress Notes for additional information about the patient's course of treatment. ED Arrival Information         Expected   -    Arrival   10/19/2023 12:34    Acuity   Emergent                 Means of arrival   Ambulance    Escorted by   [facility]    Admission type   Emergency                 Arrival complaint   weakness                     Chief Complaint   Patient presents with    Weakness - Generalized       Patient reports generalized weakness that started yesterday, reports chills yesterday. Patient reports wound on L heel has gotten worse. Initial Presentation: 58 y.o. male from home to ED via ems admitted inpatient due to Diabetic Ulcer of right heel/acute on chronic heart failure/elevated troponin/respiratory insuffiencey. Presented due to weakness, dry heaves starting 2 days prior to arrival, has chronic non healing R heel wound. On exam: LBKA. R heel wound, gangrene, foul smelling, yellow/blue drainage on dressing, no sensation to heel/foot at baseline, normal cap refill . Hs tnl  98>249/151. Glucose 147. Na 130. . Sed rate 81. Wbc 12.77.  xray of right foot showed enlarging ulcer. Ct RLE showed diffuse SC edema. In the ED blood cultures done and gram stain gram negative rods. Given IVF bolus, started on cefepime and vancomycin. 10/19/23 per Podiatry - patient has worsening gangrene to right heel. This has worsening in just a few days and at this point salvage of limb is unlikely. Will need amputation. Plan is consult vascular. Check ct. Recommend edema control. Local wound care. 10/19/23 per Vascular - Patient with non healing non salvageable right heel wound. Will need BKA vs AKA. Plan is LEADS. Check Ct RLE. Continue home Plavix, Lipitor and asa. Continue IV antibiotics. Recommend edema control to right lower extremity with Ace wrap, elevation      Date: 10/20/23    Day 2:  today with nausea. On exam:  right heel wound with dressing. L BKA. Leads pending. Local wound care, compression and ace. Continue home ASA, plavix, Lipitor. Continue antibiotics. Consult Cardiology     Per Cardiology 10/20/23:  patient has elevated troponin and suspect due to demand form worsening foot infection with Known CAD, DM2 and PAD. Plan is check echo. Continue asa, Lipitor and Plavix. IV lasix for RLE swelling. Continue vancomycin and cefepime for infection. Per ID 10/20/23 - patient with sepsis/proteus bacteremia due to progressive right heel gangrene. Plan is dc vancomycin and cefepime, start IV ceftriaxone and add Flagyl.                 ED Triage Vitals [10/19/23 1242]   Temperature Pulse Respirations Blood Pressure SpO2   98.7 °F (37.1 °C) 103 (!) 28 161/70 94 %       Temp Source Heart Rate Source Patient Position - Orthostatic VS BP Location FiO2 (%)   Oral Monitor Lying Right arm --       Pain Score           3                  Wt Readings from Last 1 Encounters:   10/20/23 113 kg (248 lb 7.3 oz)      Additional Vital Signs:   10/20/23 1121 97.9 °F (36.6 °C) 88 20 130/65 -- 95 % 28 2 L/min Nasal cannula Lying   10/20/23 0823 98 °F (36.7 °C) 87 20 129/72 -- 90 % -- -- None (Room air) Lying   10/20/23 0310 99.6 °F (37.6 °C) 87 20 142/69 88 93 % 28 2 L/min Nasal cannula Lying   10/19/23 2333 98.4 °F (36.9 °C) 83 20 160/71 100 96 % 28 2 L/min Nasal cannula Lying   10/19/23 1819 99.1 °F (37.3 °C) 91 22 134/73 -- 96 % -- -- Nasal cannula Lying   10/19/23 1700 -- 84 22 145/62 96 95 % -- -- Nasal cannula Lying   10/19/23 1645 -- 90 27 Abnormal  152/67 96 95 % -- -- -- --   10/19/23 1545 -- 92 23 Abnormal  146/64 92 93 % -- -- -- --   10/19/23 1505 -- -- -- -- -- 98 % 28 2 L/min Nasal cannula --   10/19/23 1500 -- 98 27 Abnormal  145/72 98 88 % Abnormal    -- -- None (Room air) --   SpO2: ED Provider made aware at 10/19/23 1500   10/19/23 1400 -- 102 30 Abnormal  151/70 101 93 % -- --          Pertinent Labs/Diagnostic Test Results:   VAS lower limb arterial duplex, limited, unilateral   Final Result by May Medrano MD (10/20 1729)   RIGHT LOWER LIMB:  Diffuse atherosclerotic arterial disease noted, with a 50-75% stenosis in the  proximal superficial femoral artery and a 50-75% stenosis in the proximal  superficial femoral artery. Unable to visualize calf vessels due to edema, depth, induration and thickened,  scaly skin. Ankle/Brachial index: supra-normal, consistent with poorly compressible  vessels. Prior . 95 (2020 study). Metatarsal pressure of  7 mm Hg, however, may be unreliable. Great toe pressure of unobtainable, due to flatline ppg waveform. PVR/ PPG tracings are dampened at the ankle, severely attenuated in the  metatarsal/toe.      LEFT LOWER LIMB:  BKA  Compared to previous study on 4/18/2023, there are changes and findings as  described above   CT lower extremity wo contrast right   Final Result by Mirella Saravia MD (10/19 1831)       Severe diffuse subcutaneous edema without subcutaneous gas. Limited evaluation for abscess without IV contrast; no apparent discrete collection. No CT signs of osteomyelitis. The study was marked in [facility] for immediate notification. Workstation performed: [id number]           XR chest 2 views   ED Interpretation by Durga Walker DO (10/19 9790)   CHEST     INDICATION:   weakness. COMPARISON: Chest radiograph dated 8/29/2021. EXAM PERFORMED/VIEWS:  XR CHEST PA & LATERAL     FINDINGS: Median sternotomy wires. Cardiomediastinal silhouette is stably prominent. No focal airspace consolidation. No pneumothorax or pleural effusion. Old healed right-sided rib fractures. IMPRESSION:     No acute cardiopulmonary disease. Final Result by Brandyn Pelaez MD (10/19 0780)       No acute cardiopulmonary disease. Workstation performed: [id number]           XR foot 3+ views RIGHT   ED Interpretation by Durga Walker DO (10/19 2799)   IMPRESSION:     No acute osseous abnormality. Large ulcer overlying the plantar aspect of the calcaneus which appears slightly larger than on the prior study. Final Result by Diana Maya MD (10/19 1451)       No acute osseous abnormality. Large ulcer overlying the plantar aspect of the calcaneus which appears slightly larger than on the prior study. Resident: Nirmal Jones, the attending radiologist, have reviewed the images and agree with the final report above.        Workstation performed: [id number]              10/19/23 ecg  Sinus tachycardia  Poor anterior R wave progression is noted  Abnormal ECG  When compared with ECG of 29-AUG-2021 12:11,  No significant change was found     10/19/23 ecg Sinus tachycardia with frequent Premature ventricular complexes  Otherwise normal ECG  When compared with ECG of 19-OCT-2023 12:42,  Premature ventricular complexes are now Present     10/19/23 ecg Sinus rhythm with occasional Premature ventricular complexes  Cannot rule out Anterior infarct , age undetermined  Abnormal ECG  When compared with ECG of 19-OCT-2023 14:49,  No significant change was found          Results from last 7 days   Lab Units 10/19/23  1315   SARS-COV-2   Negative             Results from last 7 days   Lab Units 10/20/23  0505 10/19/23  1833 10/19/23  1250   WBC Thousand/uL 8.12  --  12.77*   HEMOGLOBIN g/dL 11.3*  --  12.5   HEMATOCRIT % 35.8*  --  40.3   PLATELETS Thousands/uL 319 301 348   NEUTROS ABS Thousands/µL 6.84  --   --    BANDS PCT %  --   --  3            Results from last 7 days   Lab Units 10/20/23  0505 10/19/23  1250   SODIUM mmol/L 129* 130*   POTASSIUM mmol/L 4.0 4.8   CHLORIDE mmol/L 96 94*   CO2 mmol/L 27 28   ANION GAP mmol/L 6 8   BUN mg/dL 13 16   CREATININE mg/dL 0.82 0.95   EGFR ml/min/1.73sq m 93 84   CALCIUM mg/dL 8.1* 8.9            Results from last 7 days   Lab Units 10/20/23  0505 10/19/23  1250   AST U/L 15 17   ALT U/L 11 13   ALK PHOS U/L 52 69   TOTAL PROTEIN g/dL 6.8 8.3   ALBUMIN g/dL 2.9* 3.5   TOTAL BILIRUBIN mg/dL 0.57 0.58               Results from last 7 days   Lab Units 10/20/23  1640 10/20/23  1118 10/20/23  0848 10/19/23  2106 10/19/23  1303   POC GLUCOSE mg/dl 142* 138 123 96 146*            Results from last 7 days   Lab Units 10/20/23  0505 10/19/23  1250   GLUCOSE RANDOM mg/dL 118 147*                Results from last 7 days   Lab Units 10/19/23  2228 10/19/23  2049 10/19/23  1833 10/19/23  1708 10/19/23  1451 10/19/23  1250   HS TNI 0HR ng/L  --   --  767*  --   --  98*   HS TNI 2HR ng/L  --  1,295*  --   --  249*  --    HSTNI D2 ng/L  --  528*  --   --  151*  --    HS TNI 4HR ng/L 1,182*  --   --  785*  --   --    HSTNI D4 ng/L 415*  --   --  687*  --   --            Results from last 7 days   Lab Units 10/19/23  1250   PROTIME seconds 14.0   INR   1.08   PTT seconds 22*             Results from last 7 days   Lab Units 10/20/23  0505 10/19/23  1833 10/19/23  1250   PROCALCITONIN ng/ml 0. 19 0.11 0.08            Results from last 7 days   Lab Units 10/19/23  1833 10/19/23  1250   LACTIC ACID mmol/L 1.3 1.8           Results from last 7 days   Lab Units 10/19/23  1250   BNP pg/mL 63           Results from last 7 days   Lab Units 10/19/23  1250   LIPASE u/L 9*           Results from last 7 days   Lab Units 10/19/23  1250   CRP mg/L 108.0*   SED RATE mm/hour 81*           Results from last 7 days   Lab Units 10/19/23  1313   CLARITY UA   Clear   COLOR UA   Yellow   SPEC GRAV UA   1.018   PH UA   7.5   GLUCOSE UA mg/dl Negative   KETONES UA mg/dl Negative   BLOOD UA   Negative   PROTEIN UA mg/dl 70 (1+)*   NITRITE UA   Negative   BILIRUBIN UA   Negative   UROBILINOGEN UA (BE) mg/dl 4.0*   LEUKOCYTES UA   Negative   WBC UA /hpf 1-2   RBC UA /hpf 4-10*   BACTERIA UA /hpf None Seen   EPITHELIAL CELLS WET PREP /hpf Occasional   MUCUS THREADS   Occasional*           Results from last 7 days   Lab Units 10/19/23  1315   INFLUENZA A PCR   Negative   INFLUENZA B PCR   Negative   RSV PCR   Negative            Results from last 7 days   Lab Units 10/19/23  1305 10/19/23  1250   GRAM STAIN RESULT   Gram negative rods* Gram negative rods*            ED Treatment:   Medication Administration from 10/19/2023 1234 to 10/19/2023 1800           Date/Time Order Dose Route Action Comments       10/19/2023 1317 EDT cefepime (MAXIPIME) IVPB (premix in dextrose) 2,000 mg 50 mL 2,000 mg Intravenous New Bag --       10/19/2023 1352 EDT vancomycin (VANCOCIN) 1,750 mg in sodium chloride 0.9 % 500 mL IVPB 1,750 mg Intravenous New Bag --       10/19/2023 1316 EDT sodium chloride 0.9 % bolus 500 mL 500 mL Intravenous New Bag --       10/19/2023 1708 EDT LORazepam (ATIVAN) injection 0.5 mg 0.5 mg Intravenous Given --             Medical History        Past Medical History:   Diagnosis Date    Amputated below knee, left ([address]) 10/4/2018    Anxiety      Anxiety and depression      Cataract      Congestive cardiac failure (HCC) Coronary artery disease      Depression      Diabetes mellitus ([address])      Diabetic autonomic neuropathy associated with type 2 diabetes mellitus ([address])       Last Assessed: 2017    History of DVT (deep vein thrombosis)       left LE    Hyperlipidemia      Lymphedema of right lower extremity      Obesity      Orthostatic hypotension           Present on Admission:   Acute on chronic heart failure with preserved ejection fraction (HCC)   Diabetic gastroparesis    Hyperlipidemia   PAD (peripheral artery disease) ([address])   Diabetic ulcer of right heel (HCC)   Orthostatic hypotension        Admitting Diagnosis: Gangrene ([address]) [I96]  Weakness [R53.1]  Elevated troponin [R79.89]  Diabetic ulcer of right heel associated with type 2 diabetes mellitus, with fat layer exposed ([address]) [G51.450, L97.412]  Age/Sex: 58 y.o. male  Admission Orders:  10/19/23 1632 inpatient   Scheduled Medications:  aspirin, 81 mg, Oral, Daily  atorvastatin, 80 mg, Oral, Daily With Dinner  cefepime, 2,000 mg, Intravenous, Q12H - dc 1503 10/20/23  clopidogrel, 75 mg, Oral, Daily  enoxaparin, 40 mg, Subcutaneous, Daily  finasteride, 5 mg, Oral, Daily  furosemide, 40 mg, Intravenous, BID (diuretic)  insulin glargine, 30 Units, Subcutaneous, BID  insulin lispro, 1-5 Units, Subcutaneous, TID AC  pantoprazole, 40 mg, Oral, Early Morning  tamsulosin, 0.4 mg, Oral, Daily With Dinner  vancomycin, 1,250 mg, Intravenous, Q12H dc 10/20/23 1502     metroNIDAZOLE (FLAGYL) IVPB (premix) 500 mg 100 mL  Dose: 500 mg  Freq: Every 8 hours Route: IV  Start: 10/20/23 1545   cefTRIAXone (ROCEPHIN) IVPB (premix in dextrose) 2,000 mg 50 mL  Dose: 2,000 mg  Freq: Every 24 hours Route: IV  Start: 10/21/23 0100      Continuous IV Infusions: none   PRN Meds:  acetaminophen, 650 mg, Oral, Q6H PRN  ondansetron, 4 mg, Intravenous, Q4H PRN x 2 10/20/23      Telemetry   Wound care:  Right heel daily and prn:  betadine, 4x4, abd and Sunny                         Right le layer laureano compressive ACE wrap to the Leg from the ankle to the knee      IP CONSULT TO PODIATRY  IP CONSULT TO VASCULAR SURGERY  IP CONSULT TO CARDIOLOGY     Network Utilization Review Department  ATTENTION: Please call with any questions or concerns to [phone] and carefully listen to the prompts so that you are directed to the right person. All voicemails are confidential.   For Discharge needs, contact Care Management DC Support Team at [phone] opt. 2  Send all requests for admission clinical reviews, approved or denied determinations and any other requests to dedicated fax number below belonging to the campus where the patient is receiving treatment.  List of dedicated fax numbers for the Facilities:  [place] DENIALS (Administrative/Medical Necessity) [phone]   DISCHARGE SUPPORT TEAM (NETWORK) [id number] [facility] (Maternity/NICU/Pediatrics) [phone]   [address] [place] [address] [address] [place] [address] [address] [facility] [phone]   [address] [facility] [address] [facility] [address] [place] [address] [facility] [phone]   [address] [facility] [phone]   [address] [place] [address] [address] [phone]               Revision History

## 2023-10-23 PROBLEM — R06.89 RESPIRATORY INSUFFICIENCY: Status: RESOLVED | Noted: 2023-10-19 | Resolved: 2023-10-23

## 2023-10-23 PROBLEM — I96 GANGRENE (HCC): Status: ACTIVE | Noted: 2023-10-23

## 2023-10-23 LAB
ANION GAP SERPL CALCULATED.3IONS-SCNC: 7 MMOL/L
BASOPHILS # BLD AUTO: 0.07 THOUSANDS/ÂΜL (ref 0–0.1)
BASOPHILS NFR BLD AUTO: 1 % (ref 0–1)
BUN SERPL-MCNC: 28 MG/DL (ref 5–25)
CALCIUM SERPL-MCNC: 8.4 MG/DL (ref 8.4–10.2)
CHLORIDE SERPL-SCNC: 96 MMOL/L (ref 96–108)
CO2 SERPL-SCNC: 31 MMOL/L (ref 21–32)
CREAT SERPL-MCNC: 0.86 MG/DL (ref 0.6–1.3)
EOSINOPHIL # BLD AUTO: 0.15 THOUSAND/ÂΜL (ref 0–0.61)
EOSINOPHIL NFR BLD AUTO: 2 % (ref 0–6)
ERYTHROCYTE [DISTWIDTH] IN BLOOD BY AUTOMATED COUNT: 14.6 % (ref 11.6–15.1)
GFR SERPL CREATININE-BSD FRML MDRD: 91 ML/MIN/1.73SQ M
GLUCOSE SERPL-MCNC: 100 MG/DL (ref 65–140)
GLUCOSE SERPL-MCNC: 81 MG/DL (ref 65–140)
GLUCOSE SERPL-MCNC: 86 MG/DL (ref 65–140)
GLUCOSE SERPL-MCNC: 88 MG/DL (ref 65–140)
HCT VFR BLD AUTO: 36.5 % (ref 36.5–49.3)
HGB BLD-MCNC: 11.7 G/DL (ref 12–17)
IMM GRANULOCYTES # BLD AUTO: 0.03 THOUSAND/UL (ref 0–0.2)
IMM GRANULOCYTES NFR BLD AUTO: 0 % (ref 0–2)
LYMPHOCYTES # BLD AUTO: 1.36 THOUSANDS/ÂΜL (ref 0.6–4.47)
LYMPHOCYTES NFR BLD AUTO: 18 % (ref 14–44)
MCH RBC QN AUTO: 26.8 PG (ref 26.8–34.3)
MCHC RBC AUTO-ENTMCNC: 32.1 G/DL (ref 31.4–37.4)
MCV RBC AUTO: 84 FL (ref 82–98)
MONOCYTES # BLD AUTO: 0.71 THOUSAND/ÂΜL (ref 0.17–1.22)
MONOCYTES NFR BLD AUTO: 9 % (ref 4–12)
NEUTROPHILS # BLD AUTO: 5.31 THOUSANDS/ÂΜL (ref 1.85–7.62)
NEUTS SEG NFR BLD AUTO: 70 % (ref 43–75)
NRBC BLD AUTO-RTO: 0 /100 WBCS
PLATELET # BLD AUTO: 317 THOUSANDS/UL (ref 149–390)
PMV BLD AUTO: 9.2 FL (ref 8.9–12.7)
POTASSIUM SERPL-SCNC: 3.6 MMOL/L (ref 3.5–5.3)
RBC # BLD AUTO: 4.36 MILLION/UL (ref 3.88–5.62)
SODIUM SERPL-SCNC: 134 MMOL/L (ref 135–147)
WBC # BLD AUTO: 7.63 THOUSAND/UL (ref 4.31–10.16)

## 2023-10-23 PROCEDURE — 99232 SBSQ HOSP IP/OBS MODERATE 35: CPT | Performed by: INTERNAL MEDICINE

## 2023-10-23 PROCEDURE — 82948 REAGENT STRIP/BLOOD GLUCOSE: CPT

## 2023-10-23 PROCEDURE — 80048 BASIC METABOLIC PNL TOTAL CA: CPT | Performed by: INTERNAL MEDICINE

## 2023-10-23 PROCEDURE — 99233 SBSQ HOSP IP/OBS HIGH 50: CPT | Performed by: INTERNAL MEDICINE

## 2023-10-23 PROCEDURE — 99233 SBSQ HOSP IP/OBS HIGH 50: CPT | Performed by: SURGERY

## 2023-10-23 PROCEDURE — 85025 COMPLETE CBC W/AUTO DIFF WBC: CPT | Performed by: INTERNAL MEDICINE

## 2023-10-23 RX ORDER — DOCUSATE SODIUM 100 MG/1
100 CAPSULE, LIQUID FILLED ORAL 2 TIMES DAILY
Status: DISCONTINUED | OUTPATIENT
Start: 2023-10-23 | End: 2023-11-01 | Stop reason: HOSPADM

## 2023-10-23 RX ORDER — FUROSEMIDE 40 MG/1
40 TABLET ORAL
Status: DISCONTINUED | OUTPATIENT
Start: 2023-10-24 | End: 2023-11-01 | Stop reason: HOSPADM

## 2023-10-23 RX ORDER — SENNOSIDES 8.6 MG
1 TABLET ORAL
Status: DISCONTINUED | OUTPATIENT
Start: 2023-10-23 | End: 2023-11-01 | Stop reason: HOSPADM

## 2023-10-23 RX ORDER — METOCLOPRAMIDE HYDROCHLORIDE 5 MG/ML
5 INJECTION INTRAMUSCULAR; INTRAVENOUS EVERY 6 HOURS SCHEDULED
Status: DISPENSED | OUTPATIENT
Start: 2023-10-23 | End: 2023-10-24

## 2023-10-23 RX ORDER — LANOLIN ALCOHOL/MO/W.PET/CERES
6 CREAM (GRAM) TOPICAL
Status: DISCONTINUED | OUTPATIENT
Start: 2023-10-23 | End: 2023-11-01 | Stop reason: HOSPADM

## 2023-10-23 RX ORDER — INSULIN GLARGINE 100 [IU]/ML
24 INJECTION, SOLUTION SUBCUTANEOUS 2 TIMES DAILY
Status: DISCONTINUED | OUTPATIENT
Start: 2023-10-23 | End: 2023-10-24

## 2023-10-23 RX ADMIN — METRONIDAZOLE 500 MG: 500 INJECTION, SOLUTION INTRAVENOUS at 15:47

## 2023-10-23 RX ADMIN — ASPIRIN 81 MG: 81 TABLET, COATED ORAL at 10:33

## 2023-10-23 RX ADMIN — ONDANSETRON 4 MG: 2 INJECTION INTRAMUSCULAR; INTRAVENOUS at 05:58

## 2023-10-23 RX ADMIN — CEFTRIAXONE 2000 MG: 2 INJECTION, SOLUTION INTRAVENOUS at 00:50

## 2023-10-23 RX ADMIN — INSULIN GLARGINE 24 UNITS: 100 INJECTION, SOLUTION SUBCUTANEOUS at 22:10

## 2023-10-23 RX ADMIN — Medication 6 MG: at 22:10

## 2023-10-23 RX ADMIN — FINASTERIDE 5 MG: 5 TABLET, FILM COATED ORAL at 10:34

## 2023-10-23 RX ADMIN — METRONIDAZOLE 500 MG: 500 INJECTION, SOLUTION INTRAVENOUS at 00:08

## 2023-10-23 RX ADMIN — PANTOPRAZOLE SODIUM 40 MG: 40 TABLET, DELAYED RELEASE ORAL at 05:51

## 2023-10-23 RX ADMIN — ATORVASTATIN CALCIUM 80 MG: 80 TABLET, FILM COATED ORAL at 15:41

## 2023-10-23 RX ADMIN — METRONIDAZOLE 500 MG: 500 INJECTION, SOLUTION INTRAVENOUS at 08:54

## 2023-10-23 RX ADMIN — CLOPIDOGREL BISULFATE 75 MG: 75 TABLET ORAL at 10:34

## 2023-10-23 RX ADMIN — FUROSEMIDE 40 MG: 10 INJECTION, SOLUTION INTRAVENOUS at 08:50

## 2023-10-23 RX ADMIN — TAMSULOSIN HYDROCHLORIDE 0.4 MG: 0.4 CAPSULE ORAL at 15:41

## 2023-10-23 RX ADMIN — METOCLOPRAMIDE 5 MG: 5 INJECTION, SOLUTION INTRAMUSCULAR; INTRAVENOUS at 22:09

## 2023-10-23 RX ADMIN — ENOXAPARIN SODIUM 40 MG: 40 INJECTION SUBCUTANEOUS at 10:36

## 2023-10-23 NOTE — PROGRESS NOTES
Progress Note - Cardiology   Autumn Eisenberg 58 y.o. male MRN: [id number]  Unit/Bed#: E4 -01 Encounter: [id number]          Assessment / Plan  PAD, history left BKA, right heel ulcer POA: Pending right AKA  -CT RLE 10/19/23: severe diffuse subcutaneous edema without subcutaneous gas; no OM. -ISAK 10/20/2023: R 0.95/ 7/--; Diffuse atherosclerotic disease, 50-75% prox SFA x 2; unable to visualize calf vessels due to edema, depth  Bacteremia with sepsis POA  -Blood cultures 10/19/2023 positive x2 -Proteus Mirabilis    Nonischemic myocardial injury   -HS Troponin 767, 1295, 1182 without angina ischemic ECG change  CAD, Hx CABG - 2018  Echo 10/22/2023: EF 61%, moderate LVH, wall motion not accurately assessed, grade 1 DD. Normal atrial size. No signs of valvular heart disease  Hyperlipidemia  - Lipitor 60 mg    Other    * Diabetes mellitus    * Former smoker -quit 1994    * Obesity: BMI 32    * Ambulatory dysfunction    As previously noted patient's comorbidities confer intermediate risk for surgery the favorably he is without any evidence of angina, dysrhythmia, heart failure or structural heart disease  At this point we will see the patient on an as-needed basis -please call with any questions or concerns        Subjective:  No new complaint.   He denies any chest pain, dyspnea, cardiac ectopy      Vitals:  Vitals:    10/22/23 1547 10/23/23 0600   Weight: 113 kg (250 lb) 112 kg (247 lb 12.8 oz)   ,  Vitals:    10/22/23 1602 10/22/23 2300 10/23/23 0600 10/23/23 0803   BP: 104/73 116/55  125/67   BP Location: Right arm Right arm  Left arm   Pulse: 74 62  69   Resp: 18 18  18   Temp: 97.7 °F (36.5 °C) 97.8 °F (36.6 °C)  97.5 °F (36.4 °C)   TempSrc: Temporal Temporal  Temporal   SpO2: 95% 96%  96%   Weight:   112 kg (247 lb 12.8 oz)    Height:           Exam:  General:  Alert normally conversant and comfortable-appearing  Heart:  Regular with controlled rate and no pathologic murmur or rub  Lungs:  Clear throughout  Lower Limbs:  No edema    Medications:    Current Facility-Administered Medications:     acetaminophen (TYLENOL) tablet 650 mg, 650 mg, Oral, Q6H PRN, Babita Santos MD    aspirin (ECOTRIN LOW STRENGTH) EC tablet 81 mg, 81 mg, Oral, Daily, Babita Santos MD, 81 mg at 10/22/23 0831    atorvastatin (LIPITOR) tablet 80 mg, 80 mg, Oral, Daily With Dinner, Babita Santos MD, 80 mg at 10/22/23 1719    cefTRIAXone (ROCEPHIN) IVPB (premix in dextrose) 2,000 mg 50 mL, 2,000 mg, Intravenous, Q24H, Ritika Chacon DO, Last Rate: 100 mL/hr at 10/23/23 0050, 2,000 mg at 10/23/23 0050    clopidogrel (PLAVIX) tablet 75 mg, 75 mg, Oral, Daily, Babita Santos MD, 75 mg at 10/22/23 0831    enoxaparin (LOVENOX) subcutaneous injection 40 mg, 40 mg, Subcutaneous, Daily, Babita Santos MD, 40 mg at 10/22/23 7246    finasteride (PROSCAR) tablet 5 mg, 5 mg, Oral, Daily, Babita Santos MD, 5 mg at 10/22/23 0831    furosemide (LASIX) injection 40 mg, 40 mg, Intravenous, BID (diuretic), Babita Santos MD, 40 mg at 10/23/23 0850    insulin glargine (LANTUS) subcutaneous injection 30 Units 0.3 mL, 30 Units, Subcutaneous, BID, Babita Santos MD, 30 Units at 10/22/23 2143    insulin lispro (HumaLOG) 100 units/mL subcutaneous injection 1-5 Units, 1-5 Units, Subcutaneous, TID AC, 1 Units at 10/21/23 1228 **AND** Fingerstick Glucose (POCT), , , TID AC, Maru Colon MD    melatonin tablet 6 mg, 6 mg, Oral, HS PRN, Samantha Ziegler PA-C, 6 mg at 10/22/23 2142    metroNIDAZOLE (FLAGYL) IVPB (premix) 500 mg 100 mL, 500 mg, Intravenous, Q8H, Ritika Chacon DO, Last Rate: 200 mL/hr at 10/23/23 0854, 500 mg at 10/23/23 0854    ondansetron (ZOFRAN) injection 4 mg, 4 mg, Intravenous, Q4H PRN, Babita Santos MD, 4 mg at 10/23/23 0558    pantoprazole (PROTONIX) EC tablet 40 mg, 40 mg, Oral, Early Morning, Babita Santos MD, 40 mg at 10/23/23 0551    tamsulosin (FLOMAX) capsule 0.4 mg, 0.4 mg, Oral, Daily With Jeffrey Ku MD, 0.4 mg at 10/22/23 1719      Labs/Data:        Results from last 7 days   Lab Units 10/23/23  0641 10/21/23  0617 10/20/23  0505   WBC Thousand/uL 7.63 8.50 8.12   HEMOGLOBIN g/dL 11.7* 12.1 11.3*   HEMATOCRIT % 36.5 38.7 35.8*   PLATELETS Thousands/uL 317 301 319     Results from last 7 days   Lab Units 10/23/23  0641 10/21/23  0617 10/20/23  0505   POTASSIUM mmol/L 3.6 3.7 4.0   CHLORIDE mmol/L 96 93* 96   CO2 mmol/L 31 31 27   BUN mg/dL 28* 16 13   CREATININE mg/dL 0.86 0.91 0.82

## 2023-10-23 NOTE — ASSESSMENT & PLAN NOTE
Patient notes he has been retching approximately every 15 minutes around-the-clock since admission  Possibly secondary to gastroparesis, versus intolerance to antibiotics especially Flagyl  We will schedule Reglan until tomorrow and evaluate for improvement  QTc appropriate on admission

## 2023-10-23 NOTE — PLAN OF CARE
Problem: Potential for Falls  Goal: Patient will remain free of falls  Description: INTERVENTIONS:  - Educate patient/family on patient safety including physical limitations  - Instruct patient to call for assistance with activity   - Consult OT/PT to assist with strengthening/mobility   - Keep Call bell within reach  - Keep bed low and locked with side rails adjusted as appropriate  - Keep care items and personal belongings within reach  - Initiate and maintain comfort rounds  - Make Fall Risk Sign visible to staff  - Offer Toileting every 2 Hours, in advance of need  - Initiate/Maintain bed alarm  - Obtain necessary fall risk management equipment: alarm   - Apply yellow socks and bracelet for high fall risk patients  - Consider moving patient to room near nurses station  Outcome: Progressing     Problem: MOBILITY - ADULT  Goal: Maintain or return to baseline ADL function  Description: INTERVENTIONS:  -  Assess patient's ability to carry out ADLs; assess patient's baseline for ADL function and identify physical deficits which impact ability to perform ADLs (bathing, care of mouth/teeth, toileting, grooming, dressing, etc.)  - Assess/evaluate cause of self-care deficits   - Assess range of motion  - Assess patient's mobility; develop plan if impaired  - Assess patient's need for assistive devices and provide as appropriate  - Encourage maximum independence but intervene and supervise when necessary  - Involve family in performance of ADLs  - Assess for home care needs following discharge   - Consider OT consult to assist with ADL evaluation and planning for discharge  - Provide patient education as appropriate  Outcome: Progressing  Goal: Maintains/Returns to pre admission functional level  Description: INTERVENTIONS:  - Perform BMAT or MOVE assessment daily.   - Set and communicate daily mobility goal to care team and patient/family/caregiver.    - Collaborate with rehabilitation services on mobility goals if consulted  - Perform Range of Motion 3 times a day. - Reposition patient every 2 hours.   - Dangle patient 3 times a day  - Stand patient 3 times a day  - Ambulate patient 3 times a day  - Out of bed to chair 3 times a day   - Out of bed for meals 3 times a day  - Out of bed for toileting  - Record patient progress and toleration of activity level   Outcome: Progressing     Problem: PAIN - ADULT  Goal: Verbalizes/displays adequate comfort level or baseline comfort level  Description: Interventions:  - Encourage patient to monitor pain and request assistance  - Assess pain using appropriate pain scale  - Administer analgesics based on type and severity of pain and evaluate response  - Implement non-pharmacological measures as appropriate and evaluate response  - Consider cultural and social influences on pain and pain management  - Notify physician/advanced practitioner if interventions unsuccessful or patient reports new pain  Outcome: Progressing     Problem: INFECTION - ADULT  Goal: Absence or prevention of progression during hospitalization  Description: INTERVENTIONS:  - Assess and monitor for signs and symptoms of infection  - Monitor lab/diagnostic results  - Monitor all insertion sites, i.e. indwelling lines, tubes, and drains  - Monitor endotracheal if appropriate and nasal secretions for changes in amount and color  - [place] appropriate cooling/warming therapies per order  - Administer medications as ordered  - Instruct and encourage patient and family to use good hand hygiene technique  - Identify and instruct in appropriate isolation precautions for identified infection/condition  Outcome: Progressing     Problem: SAFETY ADULT  Goal: Patient will remain free of falls  Description: INTERVENTIONS:  - Educate patient/family on patient safety including physical limitations  - Instruct patient to call for assistance with activity   - Consult OT/PT to assist with strengthening/mobility   - Keep Call bell within reach  - Keep bed low and locked with side rails adjusted as appropriate  - Keep care items and personal belongings within reach  - Initiate and maintain comfort rounds  - Make Fall Risk Sign visible to staff  - Offer Toileting every 2 Hours, in advance of need  - Initiate/Maintain bed alarm  - Obtain necessary fall risk management equipment: alarm   - Apply yellow socks and bracelet for high fall risk patients  - Consider moving patient to room near nurses station  Outcome: Progressing  Goal: Maintain or return to baseline ADL function  Description: INTERVENTIONS:  -  Assess patient's ability to carry out ADLs; assess patient's baseline for ADL function and identify physical deficits which impact ability to perform ADLs (bathing, care of mouth/teeth, toileting, grooming, dressing, etc.)  - Assess/evaluate cause of self-care deficits   - Assess range of motion  - Assess patient's mobility; develop plan if impaired  - Assess patient's need for assistive devices and provide as appropriate  - Encourage maximum independence but intervene and supervise when necessary  - Involve family in performance of ADLs  - Assess for home care needs following discharge   - Consider OT consult to assist with ADL evaluation and planning for discharge  - Provide patient education as appropriate  Outcome: Progressing  Goal: Maintains/Returns to pre admission functional level  Description: INTERVENTIONS:  - Perform BMAT or MOVE assessment daily.   - Set and communicate daily mobility goal to care team and patient/family/caregiver. - Collaborate with rehabilitation services on mobility goals if consulted  - Perform Range of Motion 3 times a day. - Reposition patient every 2 hours.   - Dangle patient 3 times a day  - Stand patient 3 times a day  - Ambulate patient 3 times a day  - Out of bed to chair 3 times a day   - Out of bed for meals 3 times a day  - Out of bed for toileting  - Record patient progress and toleration of activity level   Outcome: Progressing     Problem: DISCHARGE PLANNING  Goal: Discharge to home or other facility with appropriate resources  Description: INTERVENTIONS:  - Identify barriers to discharge w/patient and caregiver  - Arrange for needed discharge resources and transportation as appropriate  - Identify discharge learning needs (meds, wound care, etc.)  - Arrange for interpretive services to assist at discharge as needed  - Refer to Case Management Department for coordinating discharge planning if the patient needs post-hospital services based on physician/advanced practitioner order or complex needs related to functional status, cognitive ability, or social support system  Outcome: Progressing     Problem: Knowledge Deficit  Goal: Patient/family/caregiver demonstrates understanding of disease process, treatment plan, medications, and discharge instructions  Description: Complete learning assessment and assess knowledge base.   Interventions:  - Provide teaching at level of understanding  - Provide teaching via preferred learning methods  Outcome: Progressing     Problem: Prexisting or High Potential for Compromised Skin Integrity  Goal: Skin integrity is maintained or improved  Description: INTERVENTIONS:  - Identify patients at risk for skin breakdown  - Assess and monitor skin integrity  - Assess and monitor nutrition and hydration status  - Monitor labs   - Assess for incontinence   - Turn and reposition patient  - Assist with mobility/ambulation  - Relieve pressure over bony prominences  - Avoid friction and shearing  - Provide appropriate hygiene as needed including keeping skin clean and dry  - Evaluate need for skin moisturizer/barrier cream  - Collaborate with interdisciplinary team   - Patient/family teaching  - Consider wound care consult   Outcome: Progressing

## 2023-10-23 NOTE — PLAN OF CARE
Problem: Potential for Falls  Goal: Patient will remain free of falls  Description: INTERVENTIONS:  - Educate patient/family on patient safety including physical limitations  - Instruct patient to call for assistance with activity   - Consult OT/PT to assist with strengthening/mobility   - Keep Call bell within reach  - Keep bed low and locked with side rails adjusted as appropriate  - Keep care items and personal belongings within reach  - Initiate and maintain comfort rounds  - Make Fall Risk Sign visible to staff  - Offer Toileting every  Hours, in advance of need  - Initiate/Maintain alarm  - Obtain necessary fall risk management equipment:   - Apply yellow socks and bracelet for high fall risk patients  - Consider moving patient to room near nurses station  Outcome: Progressing     Problem: MOBILITY - ADULT  Goal: Maintain or return to baseline ADL function  Description: INTERVENTIONS:  -  Assess patient's ability to carry out ADLs; assess patient's baseline for ADL function and identify physical deficits which impact ability to perform ADLs (bathing, care of mouth/teeth, toileting, grooming, dressing, etc.)  - Assess/evaluate cause of self-care deficits   - Assess range of motion  - Assess patient's mobility; develop plan if impaired  - Assess patient's need for assistive devices and provide as appropriate  - Encourage maximum independence but intervene and supervise when necessary  - Involve family in performance of ADLs  - Assess for home care needs following discharge   - Consider OT consult to assist with ADL evaluation and planning for discharge  - Provide patient education as appropriate  Outcome: Progressing  Goal: Maintains/Returns to pre admission functional level  Description: INTERVENTIONS:  - Perform BMAT or MOVE assessment daily.   - Set and communicate daily mobility goal to care team and patient/family/caregiver.    - Collaborate with rehabilitation services on mobility goals if consulted  - Perform Range of Motion  times a day. - Reposition patient every  hours.   - Dangle patient  times a day  - Stand patient  times a day  - Ambulate patient  times a day  - Out of bed to chair  times a day   - Out of bed for meals  times a day  - Out of bed for toileting  - Record patient progress and toleration of activity level   Outcome: Progressing     Problem: PAIN - ADULT  Goal: Verbalizes/displays adequate comfort level or baseline comfort level  Description: Interventions:  - Encourage patient to monitor pain and request assistance  - Assess pain using appropriate pain scale  - Administer analgesics based on type and severity of pain and evaluate response  - Implement non-pharmacological measures as appropriate and evaluate response  - Consider cultural and social influences on pain and pain management  - Notify physician/advanced practitioner if interventions unsuccessful or patient reports new pain  Outcome: Progressing     Problem: INFECTION - ADULT  Goal: Absence or prevention of progression during hospitalization  Description: INTERVENTIONS:  - Assess and monitor for signs and symptoms of infection  - Monitor lab/diagnostic results  - Monitor all insertion sites, i.e. indwelling lines, tubes, and drains  - Monitor endotracheal if appropriate and nasal secretions for changes in amount and color  - [place] appropriate cooling/warming therapies per order  - Administer medications as ordered  - Instruct and encourage patient and family to use good hand hygiene technique  - Identify and instruct in appropriate isolation precautions for identified infection/condition  Outcome: Progressing     Problem: SAFETY ADULT  Goal: Patient will remain free of falls  Description: INTERVENTIONS:  - Educate patient/family on patient safety including physical limitations  - Instruct patient to call for assistance with activity   - Consult OT/PT to assist with strengthening/mobility   - Keep Call bell within reach  - Keep bed low and locked with side rails adjusted as appropriate  - Keep care items and personal belongings within reach  - Initiate and maintain comfort rounds  - Make Fall Risk Sign visible to staff  - Offer Toileting every  Hours, in advance of need  - Initiate/Maintain alarm  - Obtain necessary fall risk management equipment:   - Apply yellow socks and bracelet for high fall risk patients  - Consider moving patient to room near nurses station  Outcome: Progressing  Goal: Maintain or return to baseline ADL function  Description: INTERVENTIONS:  -  Assess patient's ability to carry out ADLs; assess patient's baseline for ADL function and identify physical deficits which impact ability to perform ADLs (bathing, care of mouth/teeth, toileting, grooming, dressing, etc.)  - Assess/evaluate cause of self-care deficits   - Assess range of motion  - Assess patient's mobility; develop plan if impaired  - Assess patient's need for assistive devices and provide as appropriate  - Encourage maximum independence but intervene and supervise when necessary  - Involve family in performance of ADLs  - Assess for home care needs following discharge   - Consider OT consult to assist with ADL evaluation and planning for discharge  - Provide patient education as appropriate  Outcome: Progressing  Goal: Maintains/Returns to pre admission functional level  Description: INTERVENTIONS:  - Perform BMAT or MOVE assessment daily.   - Set and communicate daily mobility goal to care team and patient/family/caregiver. - Collaborate with rehabilitation services on mobility goals if consulted  - Perform Range of Motion times a day. - Reposition patient every  hours.   - Dangle patient  times a day  - Stand patient  times a day  - Ambulate patient  times a day  - Out of bed to chair  times a day   - Out of bed for meals times a day  - Out of bed for toileting  - Record patient progress and toleration of activity level   Outcome: Progressing Problem: DISCHARGE PLANNING  Goal: Discharge to home or other facility with appropriate resources  Description: INTERVENTIONS:  - Identify barriers to discharge w/patient and caregiver  - Arrange for needed discharge resources and transportation as appropriate  - Identify discharge learning needs (meds, wound care, etc.)  - Arrange for interpretive services to assist at discharge as needed  - Refer to Case Management Department for coordinating discharge planning if the patient needs post-hospital services based on physician/advanced practitioner order or complex needs related to functional status, cognitive ability, or social support system  Outcome: Progressing     Problem: Knowledge Deficit  Goal: Patient/family/caregiver demonstrates understanding of disease process, treatment plan, medications, and discharge instructions  Description: Complete learning assessment and assess knowledge base.   Interventions:  - Provide teaching at level of understanding  - Provide teaching via preferred learning methods  Outcome: Progressing     Problem: Prexisting or High Potential for Compromised Skin Integrity  Goal: Skin integrity is maintained or improved  Description: INTERVENTIONS:  - Identify patients at risk for skin breakdown  - Assess and monitor skin integrity  - Assess and monitor nutrition and hydration status  - Monitor labs   - Assess for incontinence   - Turn and reposition patient  - Assist with mobility/ambulation  - Relieve pressure over bony prominences  - Avoid friction and shearing  - Provide appropriate hygiene as needed including keeping skin clean and dry  - Evaluate need for skin moisturizer/barrier cream  - Collaborate with interdisciplinary team   - Patient/family teaching  - Consider wound care consult   Outcome: Progressing

## 2023-10-23 NOTE — PROGRESS NOTES
Progress Note - Infectious Disease   Angélica Levo 58 y.o. male MRN: [id number]  Unit/Bed#: E4 -01 Encounter: [id number]    IMPRESSION & RECOMMENDATIONS:   Impression/Recommendations:  1. Sepsis, POA. Tachycardia, tachypnea, leukocytosis. Secondary to bacteremia. Fortunately, patient is afebrile and hemodynamically stable. WBC count has normalized. -Antibiotic plan as below  -Follow temperatures and hemodynamics  -Follow CBC     2. Polymicrobial bacteremia. Proteus/Bacteroides. Secondary to #3. No other clear explanation. Prior wound culture from the foot did isolate Proteus, in addition to E. coli and anaerobes. -Continue ceftriaxone/Flagyl for now. -Follow-up final blood cultures from admission and tailor antibiotics accordingly.  -Anticipate eventual transition to oral antibiotic postoperatively. 3.  Progressive right heel gangrene. Prior wound cultures August 2023 with growth of E. coli, Proteus, Bacteroides. CT without subcutaneous gas or discrete abscess. Podiatry evaluation noted and foot has been deemed nonsalvageable. Vascular surgery planning amputation this week. -Antibiotic plan as above  -Podiatry/vascular surgery follow-up ongoing  -Tentative plan for BKA versus AKA this week. 4.  Diabetes mellitus type 2, with peripheral neuropathy and long-term insulin use. Risk factor for infection and poor wound healing. 5.  PAD post left BKA 2018. 6.  Elevated troponin. Cardiology input noted with suspicion for demand ischemia in the setting of acute infection. Patient had prior CABG in 2018. Cardiology follow-up ongoing. Antibiotics:  Antibiotic 5  Ceftriaxone/Flagyl    I discussed above plan with patient and his sister at bedside. Subjective:  Patient is overall feeling better. No fevers or chills. Still having periods of nausea. He was previously having hallucinations which have improved.     Objective:  Vitals:  Temp:  [97.5 °F (36.4 °C)-97.8 °F (36.6 °C)] 97.5 °F (36.4 °C)  HR:  [62-74] 69  Resp:  [18] 18  BP: (104-125)/(55-73) 125/67  SpO2:  [95 %-96 %] 96 %  Temp (24hrs), Av.7 °F (36.5 °C), Min:97.5 °F (36.4 °C), Max:97.8 °F (36.6 °C)  Current: Temperature: 97.5 °F (36.4 °C)    Physical Exam:   General:  No acute distress, nontoxic, sitting comfortably in chair  HEENT: Atraumatic normocephalic  Neck: Trachea midline  Psychiatric:  Awake and alert  Pulmonary:  Normal respiratory excursion without accessory muscle use  Abdomen:  Soft, nontender  Extremities: Left BKA. Right foot dressing is intact  Skin:  No diffuse rashes  Neuro: Moves all extremities spontaneously    Lab Results:  I have personally reviewed pertinent labs. Results from last 7 days   Lab Units 10/23/23  0641 10/21/23  0617 10/20/23  0505 10/19/23  1250   POTASSIUM mmol/L 3.6 3.7 4.0 4.8   CHLORIDE mmol/L 96 93* 96 94*   CO2 mmol/L 31 31 27 28   BUN mg/dL 28* 16 13 16   CREATININE mg/dL 0.86 0.91 0.82 0.95   EGFR ml/min/1.73sq m 91 88 93 84   CALCIUM mg/dL 8.4 8.3* 8.1* 8.9   AST U/L  --   --  15 17   ALT U/L  --   --  11 13   ALK PHOS U/L  --   --  52 69     Results from last 7 days   Lab Units 10/23/23  0641 10/21/23  0617 10/20/23  0505   WBC Thousand/uL 7.63 8.50 8.12   HEMOGLOBIN g/dL 11.7* 12.1 11.3*   PLATELETS Thousands/uL 317 301 319     Results from last 7 days   Lab Units 10/19/23  1305 10/19/23  1250   BLOOD CULTURE  Proteus mirabilis*  Bacteroides thetaiotaomicron group* Proteus mirabilis*  Bacteroides thetaiotaomicron group*   GRAM STAIN RESULT  Gram negative rods* Gram negative rods*       Imaging Studies:   I have personally reviewed pertinent imaging study reports and images in PACS. EKG, Pathology, and Other Studies:   I have personally reviewed pertinent reports.

## 2023-10-23 NOTE — PROGRESS NOTES
[address] [facility]  Progress Note  Name: Trevor Herman  MRN: [id number]  Unit/Bed#: E4 -01 I Date of Admission: 10/19/2023   Date of Service: 10/23/2023 I Hospital Day: 4    Assessment/Plan   * Sepsis [facility])  Assessment & Plan  Patient is a 27-year-old male with past medical history significant for diabetes, hypertension, hyperlipidemia, and peripheral arterial disease who presented to the ER with sepsis     She presented with sepsis, present on admission, with tachycardia, and leukocytosis   Sepsis secondary to bacteremia secondary to right heel wound   Patient was followed by the infectious disease team, and podiatry teams  Planning for right BKA  Continue IV antibiotics    Gangrene [facility])  Assessment & Plan  Patient presented with gangrene of the right heel  Had gotten progressively worse since his previous admission 8/2023  Patient was evaluated by podiatry and vascular surgery:  Foot deemed nonsalvageable  AKA planned by vascular surgery    Bacteremia due to Proteus species  Assessment & Plan  Patient presented with bacteremia with Proteus and Bacteroides   Secondary to right foot wound with gangrene  Continue antibiotics per ID, with ceftriaxone and Flagyl    PAD (peripheral artery disease) ([address])  Assessment & Plan  Patient follows with vascular surgery outpatient  Continue aspirin, Plavix, statin  He has diffuse disease proximally so vascular is recommending an AKA  Doppler: "Diffuse atherosclerotic arterial disease noted, with a 50-75% stenosis in the  proximal superficial femoral artery and a 50-75% stenosis in the proximal superficial femoral artery"    Elevated troponin  Assessment & Plan  Patient had an elevated troponin, secondary to non-MI troponin elevation secondary to sepsis   Patient was evaluated by the cardiology team  No evidence of acute ischemia  Continue home medication regimen with aspirin, Plavix, statin    S/P CABG x 3  Assessment & Plan  History of CAD status post CABG  Continue on aspirin, Plavix, statin  Elevated troponin on admission attributed to non-MI troponin elevation secondary to sepsis  He was abided by the cardiology team, no evidence of ischemia  Continue home regimen    Chronic heart failure with preserved ejection fraction [facility])  Assessment & Plan  Wt Readings from Last 3 Encounters:   10/23/23 112 kg (247 lb 12.8 oz)   09/02/23 118 kg (259 lb 7.7 oz)   07/31/23 118 kg (260 lb)     Patient has a history of chronic diastolic congestive heart failure  Most recent 2D echocardiogram 10/22: Left ventricular ejection fraction 61%. Grade 1 diastolic dysfunction. Patient was placed on IV Lasix due to lower extremity edema  Lungs clear to auscultation  Will transition back to home Lasix 40 mg p.o. twice daily    Diabetic gastroparesis   Assessment & Plan  Patient notes he has been retching approximately every 15 minutes around-the-clock since admission  Possibly secondary to gastroparesis, versus intolerance to antibiotics especially Flagyl  We will schedule Reglan until tomorrow and evaluate for improvement  QTc appropriate on admission    Orthostatic hypotension  Assessment & Plan  Was previously prescribed midodrine 2.5 mg 3 times daily however only takes this as needed  Pressure currently adequate    S/P BKA (below knee amputation), left ([address])  Assessment & Plan  With prosthesis in place    Hyperlipidemia  Assessment & Plan  Continue Lipitor 80 mg daily    Type 2 diabetes mellitus with diabetic neuropathy, with long-term current use of insulin [facility])  Assessment & Plan  Lab Results   Component Value Date    HGBA1C 6.7 (A) 02/02/2023       Recent Labs     10/22/23  2121 10/23/23  0805 10/23/23  1128 10/23/23  1549   POCGLU 100 86 81 100     Patient with poor p.o. intake and borderline blood sugars  We will decrease regimen to avoid hypoglycemia  Home regimen is Lantus 40 units twice daily:  We will decrease current regimen to 24 twice daily  Checks and sliding scale insulin    Respiratory insufficiency-resolved as of 10/23/2023  Assessment & Plan  Acute hypoxic respiratory distress possibly secondary to atelectasis from generalized weakness and pain  Chest x-ray negative for any acute cardiopulmonary disease  Was initially requiring 2 L oxygen, since weaned off: Currently adequate oxygenation on room air                 Family: Called patient's wife: Left a message to call my cell phone for update    Discussed with patient's nurse and     VTE Pharmacologic Prophylaxis: Enoxaparin (Lovenox)  VTE Mechanical Prophylaxis: sequential compression device        Certification Statement: The patient will continue to require additional inpatient hospital stay due to need for further acute intervention for AKA    Status: inpatient     ===================================================================    Subjective:  Patient notes that he had not slept in 4 nights. Last night he received melatonin and was finally able to sleep. Patient denies any pain anywhere. Notes has no sensation distal to his knee. Denies any right foot pain. He denies any pain anywhere else. Denies any chest pain. Denies any shortness of breath or cough. Denies any abdominal pain. Notes he has not passed a bowel movement in several days. Patient notes that since admission he has been nauseous, dry heaving approximately every 15 minutes. Has not tolerated any significant p.o. intake. Has not passed his bowels in several days. Denies any heartburn or indigestion. Physical Exam:   Temp:  [97.3 °F (36.3 °C)-97.8 °F (36.6 °C)] 97.3 °F (36.3 °C)  HR:  [62-73] 73  Resp:  [18] 18  BP: ()/(54-67) 106/57    Gen:  Pleasant, non-tachypnic, non-dyspnic. Conversant. Heart: regular rate and rhythm, S1S2 present, no murmur, rub or gallop  Lungs: clear to ausculatation bilaterally. No wheezing, crackles, or rhonchi. No accessory muscle use or respiratory distress.   Abd: soft, non-tender, non-distended. NABS, no guarding, rebound or peritoneal signs. Extremities: Left BKA with prosthesis in place. Right lower extremity dressing in place  Neuro: awake, alert. Fluent speech. Interactive      LABS:   Results from last 7 days   Lab Units 10/23/23  0641 10/21/23  0617 10/20/23  0505   WBC Thousand/uL 7.63 8.50 8.12   HEMOGLOBIN g/dL 11.7* 12.1 11.3*   HEMATOCRIT % 36.5 38.7 35.8*   PLATELETS Thousands/uL 317 301 319     Results from last 7 days   Lab Units 10/23/23  0641 10/21/23  0617 10/20/23  0505   POTASSIUM mmol/L 3.6 3.7 4.0   CHLORIDE mmol/L 96 93* 96   CO2 mmol/L 31 31 27   BUN mg/dL 28* 16 13   CREATININE mg/dL 0.86 0.91 0.82   CALCIUM mg/dL 8.4 8.3* 8.1*       Hospital Data:  10/22: Echocardiogram:    Left Ventricle: Left ventricular cavity size is normal. Wall thickness is moderately increased. The left ventricular ejection fraction is 61%. Systolic function is normal. Wall motion cannot be accurately assessed. Diastolic function is mildly abnormal, consistent with grade I (abnormal) relaxation. Right Ventricle: Systolic function is mildly reduced. 10/20 LE ADS  IGHT LOWER LIMB:  Diffuse atherosclerotic arterial disease noted, with a 50-75% stenosis in the  proximal superficial femoral artery and a 50-75% stenosis in the proximal  superficial femoral artery. Unable to visualize calf vessels due to edema, depth, induration and thickened,  scaly skin. Ankle/Brachial index: supra-normal, consistent with poorly compressible  vessels. Prior . 95 (2020 study). Metatarsal pressure of  7 mm Hg, however, may be unreliable. Great toe pressure of unobtainable, due to flatline ppg waveform. PVR/ PPG tracings are dampened at the ankle, severely attenuated in the  metatarsal/toe. LEFT LOWER LIMB:  BKA  Compared to previous study on 4/18/2023, there are changes and findings as  described above. 10/19: CT right lower extremity  Severe diffuse subcutaneous edema without subcutaneous gas. Limited evaluation for abscess without IV contrast; no apparent discrete collection. No CT signs of osteomyelitis. 10/19 chest x-ray  No acute cardiopulmonary disease. 10/19 right foot x-ray  No acute osseous abnormality. Large ulcer overlying the plantar aspect of the calcaneus which appears slightly larger than on the prior study. Microbiology  10/19: Blood culture: Proteus, Bacteroides x2  10/19: Negative COVID, influenza, RSV      ---------------------------------------------------------------------------------------------------------------  This note has been constructed using a voice recognition system.

## 2023-10-23 NOTE — PROGRESS NOTES
[address] [facility]  Progress Note  Name: Julianne Cameron  MRN: [id number]  Unit/Bed#: E4 -01 I Date of Admission: 10/19/2023   Date of Service: 10/23/2023 I Hospital Day: 4    Assessment/Plan   PAD (peripheral artery disease) [facility])  Assessment & Plan  57 yo male former smoker (quit 1994) with hx of DM II, CAD S/P CABG w/ R GSV, L BKA (by Dr. Alexsandra Melgoza 8/3/18 for tissue loss), PAD, chronic R heel ulcer presented to [facility] on 10/19/23 w/ weakness, chills, nausea and vomiting. Pt admitted w/ worsening R foot wound. R foot xray shows large ulcer overlying plantar aspect of calcaneous. CT RLE shows severe diffuse subcutaneous edema without subcutaneous gas; (-) for OM. Pt seen in consult by podiatry for R heel wound. Per podiatry, limb salvage is unlikely and pt will need amputation of RLE. Cardiology consulted for operative risk stratification. Vascular surgery consulted for higher level amputation. Recommending above-knee amputation in setting of infection, severe lymphedema, skin changes, and existing contralateral amputation. Pt has not had a follow-up w/ vascular since 8/31/18. Home medications include ASA, plavix and lipitor. Cardiology consulted re: cardiac risk stratification and recommended 2D echo. Diagnostics:   -CT RLE 10/19/23: severe diffuse subcutaneous edema without subcutaneous gas; no OM. -Xray R foot 10/19/23: large ulcer overlying plantar aspect of calcaneous.   -ISAK 10/20/2023: R 0.95/ 7/--; Diffuse atherosclerotic disease, 50-75% prox SFA x 2; unable to visualize calf vessels due to edema, depth    Plan:  -Nonsalvageable RLE secondary to infected heal wound, per podiatry  -Sepsis bacteremia: BC (+) proteus; continue rocephin and flagyl per SLIM  -Plan for R AKA pending cardiology risk assessment  -Echocardiogram from 10/22: LVEF 72% w/ normal systolic function and mildly abnormal diastolic dysfunction  -Continue wound care per podiatry  -Continue ASA, plavix and lipitor  -Maintain good diabetes control  -Discussed w/ Dr. Kristen Flores Doctor      Subjective:  Pt seen for exam while sitting upright in bed; NAD. Pt is agreeable to R AKA. He still reports mild nausea and otherwise, denies any further complaints at this time. Vitals:  /67 (BP Location: Left arm)   Pulse 69   Temp 97.5 °F (36.4 °C) (Temporal)   Resp 18   Ht 6' 1" (1.854 m)   Wt 112 kg (247 lb 12.8 oz)   SpO2 96%   BMI 32.69 kg/m²     I/Os:  I/O last 3 completed shifts:  In: -   Out: 1100 [Urine:1100]  No intake/output data recorded.     Lab Results and Cultures:   Lab Results   Component Value Date    WBC 7.63 10/23/2023    HGB 11.7 (L) 10/23/2023    HCT 36.5 10/23/2023    MCV 84 10/23/2023     10/23/2023     Lab Results   Component Value Date    GLUCOSE 165 (H) 03/28/2018    CALCIUM 8.4 10/23/2023     09/13/2016    K 3.6 10/23/2023    CO2 31 10/23/2023    CL 96 10/23/2023    BUN 28 (H) 10/23/2023    CREATININE 0.86 10/23/2023     Lab Results   Component Value Date    INR 1.08 10/19/2023    INR 1.08 08/29/2023    INR 0.99 08/29/2021    PROTIME 14.0 10/19/2023    PROTIME 14.0 08/29/2023    PROTIME 12.9 08/29/2021     Blood Culture:   Lab Results   Component Value Date    BLOODCX Proteus mirabilis (A) 10/19/2023    BLOODCX Bacteroides thetaiotaomicron group (A) 10/19/2023     Urinalysis:   Lab Results   Component Value Date    COLORU Yellow 10/19/2023    CLARITYU Clear 10/19/2023    SPECGRAV 1.018 10/19/2023    PHUR 7.5 10/19/2023    PHUR 7.5 05/23/2018    LEUKOCYTESUR Negative 10/19/2023    NITRITE Negative 10/19/2023    GLUCOSEU Negative 10/19/2023    KETONESU Negative 10/19/2023    BILIRUBINUR Negative 10/19/2023    BLOODU Negative 10/19/2023         Medications:  Current Facility-Administered Medications   Medication Dose Route Frequency    acetaminophen (TYLENOL) tablet 650 mg  650 mg Oral Q6H PRN    aspirin (ECOTRIN LOW STRENGTH) EC tablet 81 mg  81 mg Oral Daily    atorvastatin (LIPITOR) tablet 80 mg  80 mg Oral Daily With Dinner    cefTRIAXone (ROCEPHIN) IVPB (premix in dextrose) 2,000 mg 50 mL  2,000 mg Intravenous Q24H    clopidogrel (PLAVIX) tablet 75 mg  75 mg Oral Daily    enoxaparin (LOVENOX) subcutaneous injection 40 mg  40 mg Subcutaneous Daily    finasteride (PROSCAR) tablet 5 mg  5 mg Oral Daily    furosemide (LASIX) injection 40 mg  40 mg Intravenous BID (diuretic)    insulin glargine (LANTUS) subcutaneous injection 30 Units 0.3 mL  30 Units Subcutaneous BID    insulin lispro (HumaLOG) 100 units/mL subcutaneous injection 1-5 Units  1-5 Units Subcutaneous TID AC    melatonin tablet 6 mg  6 mg Oral HS PRN    metroNIDAZOLE (FLAGYL) IVPB (premix) 500 mg 100 mL  500 mg Intravenous Q8H    ondansetron (ZOFRAN) injection 4 mg  4 mg Intravenous Q4H PRN    pantoprazole (PROTONIX) EC tablet 40 mg  40 mg Oral Early Morning    tamsulosin (FLOMAX) capsule 0.4 mg  0.4 mg Oral Daily With Dinner       Imaging:  See above    Physical Exam:    General appearance: alert and oriented, in no acute distress  Skin:  skin color, texture turgor normal w/ exception of thick, brown scaling and wound on R heel  Neurologic: Grossly normal  Head: Normocephalic, without obvious abnormality, atraumatic  Lungs: clear to auscultation bilaterally  Heart: regular rate and rhythm  Abdomen: soft, non-tender; bowel sounds normal; no masses,  no organomegaly  Extremities:  warm, no cyanosis or clubbing, RLE lymphedema improved 1+ non-pitting RLE edema; L BKA    Wound/Incision:  R heel wound drsg dry and intact    Pulse exam:  Radial: Right: 2+ Left[de-identified] 2+      CARLTON Yu  10/23/2023

## 2023-10-24 ENCOUNTER — ANESTHESIA EVENT (INPATIENT)
Dept: PERIOP | Facility: HOSPITAL | Age: 63
DRG: 616 | End: 2023-10-24
Payer: COMMERCIAL

## 2023-10-24 LAB
ABO GROUP BLD: NORMAL
BLD GP AB SCN SERPL QL: NEGATIVE
GLUCOSE SERPL-MCNC: 102 MG/DL (ref 65–140)
GLUCOSE SERPL-MCNC: 104 MG/DL (ref 65–140)
GLUCOSE SERPL-MCNC: 104 MG/DL (ref 65–140)
GLUCOSE SERPL-MCNC: 105 MG/DL (ref 65–140)
GLUCOSE SERPL-MCNC: 113 MG/DL (ref 65–140)
RH BLD: POSITIVE
SPECIMEN EXPIRATION DATE: NORMAL

## 2023-10-24 PROCEDURE — 86901 BLOOD TYPING SEROLOGIC RH(D): CPT | Performed by: NURSE PRACTITIONER

## 2023-10-24 PROCEDURE — 99232 SBSQ HOSP IP/OBS MODERATE 35: CPT | Performed by: INTERNAL MEDICINE

## 2023-10-24 PROCEDURE — 86900 BLOOD TYPING SEROLOGIC ABO: CPT | Performed by: NURSE PRACTITIONER

## 2023-10-24 PROCEDURE — 82948 REAGENT STRIP/BLOOD GLUCOSE: CPT

## 2023-10-24 PROCEDURE — 99233 SBSQ HOSP IP/OBS HIGH 50: CPT | Performed by: SURGERY

## 2023-10-24 PROCEDURE — 86923 COMPATIBILITY TEST ELECTRIC: CPT

## 2023-10-24 PROCEDURE — 86850 RBC ANTIBODY SCREEN: CPT | Performed by: NURSE PRACTITIONER

## 2023-10-24 RX ORDER — INSULIN GLARGINE 100 [IU]/ML
8 INJECTION, SOLUTION SUBCUTANEOUS 2 TIMES DAILY
Status: DISCONTINUED | OUTPATIENT
Start: 2023-10-24 | End: 2023-10-24

## 2023-10-24 RX ORDER — ALPRAZOLAM 0.25 MG/1
0.25 TABLET ORAL 2 TIMES DAILY PRN
Status: DISCONTINUED | OUTPATIENT
Start: 2023-10-24 | End: 2023-11-01 | Stop reason: HOSPADM

## 2023-10-24 RX ADMIN — CEFTRIAXONE 2000 MG: 2 INJECTION, SOLUTION INTRAVENOUS at 01:03

## 2023-10-24 RX ADMIN — CLOPIDOGREL BISULFATE 75 MG: 75 TABLET ORAL at 09:13

## 2023-10-24 RX ADMIN — METRONIDAZOLE 500 MG: 500 INJECTION, SOLUTION INTRAVENOUS at 00:11

## 2023-10-24 RX ADMIN — PANTOPRAZOLE SODIUM 40 MG: 40 TABLET, DELAYED RELEASE ORAL at 05:23

## 2023-10-24 RX ADMIN — FINASTERIDE 5 MG: 5 TABLET, FILM COATED ORAL at 09:13

## 2023-10-24 RX ADMIN — METRONIDAZOLE 500 MG: 500 INJECTION, SOLUTION INTRAVENOUS at 22:00

## 2023-10-24 RX ADMIN — ENOXAPARIN SODIUM 40 MG: 40 INJECTION SUBCUTANEOUS at 09:14

## 2023-10-24 RX ADMIN — CEFTRIAXONE 2000 MG: 2 INJECTION, SOLUTION INTRAVENOUS at 22:40

## 2023-10-24 RX ADMIN — DOCUSATE SODIUM 100 MG: 100 CAPSULE, LIQUID FILLED ORAL at 16:35

## 2023-10-24 RX ADMIN — METRONIDAZOLE 500 MG: 500 INJECTION, SOLUTION INTRAVENOUS at 16:36

## 2023-10-24 RX ADMIN — METOCLOPRAMIDE 5 MG: 5 INJECTION, SOLUTION INTRAMUSCULAR; INTRAVENOUS at 11:51

## 2023-10-24 RX ADMIN — Medication 6 MG: at 22:38

## 2023-10-24 RX ADMIN — FUROSEMIDE 40 MG: 40 TABLET ORAL at 16:35

## 2023-10-24 RX ADMIN — ASPIRIN 81 MG: 81 TABLET, COATED ORAL at 09:13

## 2023-10-24 RX ADMIN — METOCLOPRAMIDE 5 MG: 5 INJECTION, SOLUTION INTRAMUSCULAR; INTRAVENOUS at 05:23

## 2023-10-24 RX ADMIN — FUROSEMIDE 40 MG: 40 TABLET ORAL at 09:13

## 2023-10-24 RX ADMIN — ATORVASTATIN CALCIUM 80 MG: 80 TABLET, FILM COATED ORAL at 16:35

## 2023-10-24 RX ADMIN — ALPRAZOLAM 0.25 MG: 0.25 TABLET ORAL at 22:38

## 2023-10-24 RX ADMIN — DOCUSATE SODIUM 100 MG: 100 CAPSULE, LIQUID FILLED ORAL at 09:13

## 2023-10-24 RX ADMIN — TAMSULOSIN HYDROCHLORIDE 0.4 MG: 0.4 CAPSULE ORAL at 16:35

## 2023-10-24 RX ADMIN — METRONIDAZOLE 500 MG: 500 INJECTION, SOLUTION INTRAVENOUS at 09:15

## 2023-10-24 NOTE — ASSESSMENT & PLAN NOTE
Lab Results   Component Value Date    HGBA1C 6.7 (A) 02/02/2023       Recent Labs     10/22/23  2121 10/23/23  0805 10/23/23  1128 10/23/23  1549   POCGLU 100 86 81 100     Patient with poor p.o. intake and borderline blood sugars  We will decrease regimen to avoid hypoglycemia  Home regimen is Lantus 40 units twice daily:  We will decrease current regimen to 24 twice daily  Checks and sliding scale insulin

## 2023-10-24 NOTE — PLAN OF CARE
Problem: Potential for Falls  Goal: Patient will remain free of falls  Description: INTERVENTIONS:  - Educate patient/family on patient safety including physical limitations  - Instruct patient to call for assistance with activity   - Consult OT/PT to assist with strengthening/mobility   - Keep Call bell within reach  - Keep bed low and locked with side rails adjusted as appropriate  - Keep care items and personal belongings within reach  - Initiate and maintain comfort rounds  - Make Fall Risk Sign visible to staff  - Offer Toileting every  Hours, in advance of need  - Initiate/Maintain alarm  - Obtain necessary fall risk management equipment:   - Apply yellow socks and bracelet for high fall risk patients  - Consider moving patient to room near nurses station  Outcome: Progressing     Problem: MOBILITY - ADULT  Goal: Maintain or return to baseline ADL function  Description: INTERVENTIONS:  -  Assess patient's ability to carry out ADLs; assess patient's baseline for ADL function and identify physical deficits which impact ability to perform ADLs (bathing, care of mouth/teeth, toileting, grooming, dressing, etc.)  - Assess/evaluate cause of self-care deficits   - Assess range of motion  - Assess patient's mobility; develop plan if impaired  - Assess patient's need for assistive devices and provide as appropriate  - Encourage maximum independence but intervene and supervise when necessary  - Involve family in performance of ADLs  - Assess for home care needs following discharge   - Consider OT consult to assist with ADL evaluation and planning for discharge  - Provide patient education as appropriate  Outcome: Progressing  Goal: Maintains/Returns to pre admission functional level  Description: INTERVENTIONS:  - Perform BMAT or MOVE assessment daily.   - Set and communicate daily mobility goal to care team and patient/family/caregiver.    - Collaborate with rehabilitation services on mobility goals if consulted  - Perform Range of Motion times a day. - Reposition patient every  hours.   - Dangle patient  times a day  - Stand patient  times a day  - Ambulate patient  times a day  - Out of bed to chair  times a day   - Out of bed for meals  times a day  - Out of bed for toileting  - Record patient progress and toleration of activity level   Outcome: Progressing     Problem: PAIN - ADULT  Goal: Verbalizes/displays adequate comfort level or baseline comfort level  Description: Interventions:  - Encourage patient to monitor pain and request assistance  - Assess pain using appropriate pain scale  - Administer analgesics based on type and severity of pain and evaluate response  - Implement non-pharmacological measures as appropriate and evaluate response  - Consider cultural and social influences on pain and pain management  - Notify physician/advanced practitioner if interventions unsuccessful or patient reports new pain  Outcome: Progressing     Problem: INFECTION - ADULT  Goal: Absence or prevention of progression during hospitalization  Description: INTERVENTIONS:  - Assess and monitor for signs and symptoms of infection  - Monitor lab/diagnostic results  - Monitor all insertion sites, i.e. indwelling lines, tubes, and drains  - Monitor endotracheal if appropriate and nasal secretions for changes in amount and color  - [place] appropriate cooling/warming therapies per order  - Administer medications as ordered  - Instruct and encourage patient and family to use good hand hygiene technique  - Identify and instruct in appropriate isolation precautions for identified infection/condition  Outcome: Progressing     Problem: SAFETY ADULT  Goal: Patient will remain free of falls  Description: INTERVENTIONS:  - Educate patient/family on patient safety including physical limitations  - Instruct patient to call for assistance with activity   - Consult OT/PT to assist with strengthening/mobility   - Keep Call bell within reach  - Keep bed low and locked with side rails adjusted as appropriate  - Keep care items and personal belongings within reach  - Initiate and maintain comfort rounds  - Make Fall Risk Sign visible to staff  - Offer Toileting every  Hours, in advance of need  - Initiate/Maintain arm  - Obtain necessary fall risk management equipment:   - Apply yellow socks and bracelet for high fall risk patients  - Consider moving patient to room near nurses station  Outcome: Progressing  Goal: Maintain or return to baseline ADL function  Description: INTERVENTIONS:  -  Assess patient's ability to carry out ADLs; assess patient's baseline for ADL function and identify physical deficits which impact ability to perform ADLs (bathing, care of mouth/teeth, toileting, grooming, dressing, etc.)  - Assess/evaluate cause of self-care deficits   - Assess range of motion  - Assess patient's mobility; develop plan if impaired  - Assess patient's need for assistive devices and provide as appropriate  - Encourage maximum independence but intervene and supervise when necessary  - Involve family in performance of ADLs  - Assess for home care needs following discharge   - Consider OT consult to assist with ADL evaluation and planning for discharge  - Provide patient education as appropriate  Outcome: Progressing  Goal: Maintains/Returns to pre admission functional level  Description: INTERVENTIONS:  - Perform BMAT or MOVE assessment daily.   - Set and communicate daily mobility goal to care team and patient/family/caregiver. - Collaborate with rehabilitation services on mobility goals if consulted  - Perform Range of Motion imes a day. - Reposition patient every  hours.   - Dangle patient  times a day  - Stand patient  times a day  - Ambulate patient  times a day  - Out of bed to chair  times a day   - Out of bed for meals  times a day  - Out of bed for toileting  - Record patient progress and toleration of activity level   Outcome: Progressing     Problem: DISCHARGE PLANNING  Goal: Discharge to home or other facility with appropriate resources  Description: INTERVENTIONS:  - Identify barriers to discharge w/patient and caregiver  - Arrange for needed discharge resources and transportation as appropriate  - Identify discharge learning needs (meds, wound care, etc.)  - Arrange for interpretive services to assist at discharge as needed  - Refer to Case Management Department for coordinating discharge planning if the patient needs post-hospital services based on physician/advanced practitioner order or complex needs related to functional status, cognitive ability, or social support system  Outcome: Progressing     Problem: Knowledge Deficit  Goal: Patient/family/caregiver demonstrates understanding of disease process, treatment plan, medications, and discharge instructions  Description: Complete learning assessment and assess knowledge base.   Interventions:  - Provide teaching at level of understanding  - Provide teaching via preferred learning methods  Outcome: Progressing     Problem: Prexisting or High Potential for Compromised Skin Integrity  Goal: Skin integrity is maintained or improved  Description: INTERVENTIONS:  - Identify patients at risk for skin breakdown  - Assess and monitor skin integrity  - Assess and monitor nutrition and hydration status  - Monitor labs   - Assess for incontinence   - Turn and reposition patient  - Assist with mobility/ambulation  - Relieve pressure over bony prominences  - Avoid friction and shearing  - Provide appropriate hygiene as needed including keeping skin clean and dry  - Evaluate need for skin moisturizer/barrier cream  - Collaborate with interdisciplinary team   - Patient/family teaching  - Consider wound care consult   Outcome: Progressing

## 2023-10-24 NOTE — ASSESSMENT & PLAN NOTE
Patient is a 78-year-old male with past medical history significant for diabetes, hypertension, hyperlipidemia, and peripheral arterial disease who presented to the ER with sepsis     She presented with sepsis, present on admission, with tachycardia, and leukocytosis   Sepsis secondary to bacteremia secondary to right heel wound   Patient was followed by the infectious disease team, and podiatry teams  Planning for right BKA  Continue IV antibiotics

## 2023-10-24 NOTE — PROGRESS NOTES
Progress Note - Infectious Disease   Jossy Penny 58 y.o. male MRN: [id number]  Unit/Bed#: E4 -01 Encounter: [id number]      IMPRESSION & RECOMMENDATIONS:   Impression/Recommendations:  1. Sepsis, POA. Tachycardia, tachypnea, leukocytosis. Secondary to bacteremia. Fortunately, patient is afebrile and hemodynamically stable. WBC count has normalized. -Antibiotic plan as below  -Follow temperatures and hemodynamics  -Follow CBC     2. Polymicrobial bacteremia. Proteus/Bacteroides. Secondary to #3. No other clear explanation. Prior wound culture from the foot did isolate Proteus, in addition to E. coli and anaerobes. -Continue ceftriaxone/Flagyl for now. -Follow-up final blood cultures from admission and tailor antibiotics accordingly.  -Anticipate transition to oral antibiotic postoperatively. 3.  Progressive right heel gangrene. Prior wound cultures 2023 with growth of E. coli, Proteus, Bacteroides. CT without subcutaneous gas or discrete abscess. Podiatry evaluation noted and foot has been deemed nonsalvageable. Vascular surgery planning amputation this week. -Antibiotic plan as above  -Podiatry/vascular surgery follow-up ongoing  -Tentative plan for amputation tomorrow, 10/25. 4.  Diabetes mellitus type 2, with peripheral neuropathy and long-term insulin use. Risk factor for infection and poor wound healing. 5.  PAD post left BKA . 6.  Elevated troponin. Cardiology input noted with suspicion for demand ischemia in the setting of acute infection. Patient had prior CABG in 2018. Cardiology follow-up ongoing. Antibiotics:  Antibiotic 6  Ceftriaxone/Flagyl           Subjective:  No new reported overnight events. No fevers. Scheduled for surgery tomorrow.     Objective:  Vitals:  Temp:  [97.3 °F (36.3 °C)-98 °F (36.7 °C)] 98 °F (36.7 °C)  HR:  [69-74] 70  Resp:  [18] 18  BP: ()/(57-68) 121/68  SpO2:  [92 %-98 %] 98 %  Temp (24hrs), Av.6 °F (36.4 °C), Min:97.3 °F (36.3 °C), Max:98 °F (36.7 °C)  Current: Temperature: 98 °F (36.7 °C)    Physical Exam:   General:  No acute distress, nontoxic  HEENT: Atraumatic normocephalic  Neck: Trachea midline  Psychiatric:  Awake and alert  Pulmonary:  Normal respiratory excursion without accessory muscle use  Abdomen: Nondistended  Extremities: Dressing intact  Skin:  No visible rashes    Lab Results:  I have personally reviewed pertinent labs. Results from last 7 days   Lab Units 10/23/23  0641 10/21/23  0617 10/20/23  0505 10/19/23  1250   POTASSIUM mmol/L 3.6 3.7 4.0 4.8   CHLORIDE mmol/L 96 93* 96 94*   CO2 mmol/L 31 31 27 28   BUN mg/dL 28* 16 13 16   CREATININE mg/dL 0.86 0.91 0.82 0.95   EGFR ml/min/1.73sq m 91 88 93 84   CALCIUM mg/dL 8.4 8.3* 8.1* 8.9   AST U/L  --   --  15 17   ALT U/L  --   --  11 13   ALK PHOS U/L  --   --  52 69     Results from last 7 days   Lab Units 10/23/23  0641 10/21/23  0617 10/20/23  0505   WBC Thousand/uL 7.63 8.50 8.12   HEMOGLOBIN g/dL 11.7* 12.1 11.3*   PLATELETS Thousands/uL 317 301 319     Results from last 7 days   Lab Units 10/19/23  1305 10/19/23  1250   BLOOD CULTURE  Proteus mirabilis*  Bacteroides thetaiotaomicron group* Proteus mirabilis*  Bacteroides thetaiotaomicron group*   GRAM STAIN RESULT  Gram negative rods* Gram negative rods*       Imaging Studies:   I have personally reviewed pertinent imaging study reports and images in PACS. EKG, Pathology, and Other Studies:   I have personally reviewed pertinent reports. Suturegard Body: The suture ends were repeatedly re-tightened and re-clamped to achieve the desired tissue expansion.

## 2023-10-24 NOTE — ASSESSMENT & PLAN NOTE
Lab Results   Component Value Date    HGBA1C 6.7 (A) 02/02/2023       Recent Labs     10/23/23  2221 10/24/23  0845 10/24/23  1153 10/24/23  1712   POCGLU 105 104 102 113     Patient with poor p.o. intake and borderline blood sugars  decreased regimen to avoid hypoglycemia  Home regimen is Lantus 40 units twice daily:   Patient with low-normal blood sugars despite Lantus being held last night and this morning   hold Lantus tonight to avoid hypoglycemia intraoperatively while he was n.p.o.   Checks and sliding scale insulin

## 2023-10-24 NOTE — PROGRESS NOTES
[address] [facility]  Progress Note  Name: Niya Nieves  MRN: [id number]  Unit/Bed#: E4 -01 I Date of Admission: 10/19/2023   Date of Service: 10/24/2023 I Hospital Day: 5    Assessment/Plan   PAD (peripheral artery disease) [facility])  Assessment & Plan  57 yo male former smoker (quit 1994) with hx of DM II, CAD S/P CABG w/ R GSV, L BKA (by Dr. Shital Benson 8/3/18 for tissue loss), PAD and chronic R heel ulcer presented to [facility] on 10/19/23 w/ weakness, chills, nausea and vomiting. Pt admitted w/ worsening R foot wound. R foot xray shows large ulcer overlying plantar aspect of calcaneous. CT RLE shows severe diffuse subcutaneous edema without subcutaneous gas; (-) for OM. Pt seen in consult by podiatry for R heel wound. Per podiatry, limb salvage is unlikely and pt will need amputation of RLE. Vascular surgery consulted for higher level amputation. Recommending above-knee amputation in setting of infection, severe lymphedema, skin changes, and existing contralateral amputation. Pt seen by cardiology for cardiac risk stratification. Per cardiology, "pt is okay to proceed with planned above-knee amputation surgery. He does have elevated risk for perioperative MI and will need close monitoring."     Diagnostics:   -10/22/23: Echo: LVEF 24% w/ normal systolic function and mildly abnormal diastolic dysfunction  -ISAK 10/20/23: R YUDITH: 0.95/7/-; Diffuse atherosclerotic disease, 50-75% prox SFA x 2; unable to visualize calf vessels due to edema, depth  -CT RLE 10/19/23: severe diffuse subcutaneous edema without subcutaneous gas; no OM. -Xray R foot 10/19/23: large ulcer overlying plantar aspect of calcaneous. Plan:  -Nonsalvageable RLE secondary to infected heal wound, per podiatry  -Sepsis bacteremia: BC (+) proteus; continue rocephin and flagyl per SLIM  -Per cardiology, okay to proceed w/ surgery.  Pt has an elevated risk for latrell-op MI and will need close monitoring  -Plan for R AKA; scheduled for Wednesday (10/25). -NPO after MN for OR  -Continue wound care per podiatry  -Continue ASA, plavix and lipitor  -Maintain good diabetes control  -Discussed w/ Dr. Alison Whipple      Subjective:  Pt seen for exam while lying in bed; NAD. Pt reports a poor appetite since prior to his hospitalization and otherwise, denies any further complaints at this time. Vitals:  /68 (BP Location: Right arm)   Pulse 70   Temp 98 °F (36.7 °C) (Temporal)   Resp 18   Ht 6' 1" (1.854 m)   Wt 112 kg (245 lb 13 oz)   SpO2 98%   BMI 32.43 kg/m²     I/Os:  I/O last 3 completed shifts: In: 200 [P.O.:200]  Out: 1367 [Urine:1655]  No intake/output data recorded.     Lab Results and Cultures:   Lab Results   Component Value Date    WBC 7.63 10/23/2023    HGB 11.7 (L) 10/23/2023    HCT 36.5 10/23/2023    MCV 84 10/23/2023     10/23/2023     Lab Results   Component Value Date    GLUCOSE 165 (H) 03/28/2018    CALCIUM 8.4 10/23/2023     09/13/2016    K 3.6 10/23/2023    CO2 31 10/23/2023    CL 96 10/23/2023    BUN 28 (H) 10/23/2023    CREATININE 0.86 10/23/2023     Lab Results   Component Value Date    INR 1.08 10/19/2023    INR 1.08 08/29/2023    INR 0.99 08/29/2021    PROTIME 14.0 10/19/2023    PROTIME 14.0 08/29/2023    PROTIME 12.9 08/29/2021     Blood Culture:   Lab Results   Component Value Date    BLOODCX Proteus mirabilis (A) 10/19/2023    BLOODCX Bacteroides thetaiotaomicron group (A) 10/19/2023     Urinalysis:   Lab Results   Component Value Date    COLORU Yellow 10/19/2023    CLARITYU Clear 10/19/2023    SPECGRAV 1.018 10/19/2023    PHUR 7.5 10/19/2023    PHUR 7.5 05/23/2018    LEUKOCYTESUR Negative 10/19/2023    NITRITE Negative 10/19/2023    GLUCOSEU Negative 10/19/2023    KETONESU Negative 10/19/2023    BILIRUBINUR Negative 10/19/2023    BLOODU Negative 10/19/2023         Medications:  Current Facility-Administered Medications   Medication Dose Route Frequency    acetaminophen (TYLENOL) tablet 650 mg  650 mg Oral Q6H PRN    aspirin (ECOTRIN LOW STRENGTH) EC tablet 81 mg  81 mg Oral Daily    atorvastatin (LIPITOR) tablet 80 mg  80 mg Oral Daily With Dinner    cefTRIAXone (ROCEPHIN) IVPB (premix in dextrose) 2,000 mg 50 mL  2,000 mg Intravenous Q24H    clopidogrel (PLAVIX) tablet 75 mg  75 mg Oral Daily    docusate sodium (COLACE) capsule 100 mg  100 mg Oral BID    enoxaparin (LOVENOX) subcutaneous injection 40 mg  40 mg Subcutaneous Daily    finasteride (PROSCAR) tablet 5 mg  5 mg Oral Daily    furosemide (LASIX) tablet 40 mg  40 mg Oral BID (diuretic)    insulin glargine (LANTUS) subcutaneous injection 24 Units 0.24 mL  24 Units Subcutaneous BID    insulin lispro (HumaLOG) 100 units/mL subcutaneous injection 1-5 Units  1-5 Units Subcutaneous TID AC    magnesium hydroxide (MILK OF MAGNESIA) oral suspension 30 mL  30 mL Oral Daily PRN    melatonin tablet 6 mg  6 mg Oral HS    metoclopramide (REGLAN) injection 5 mg  5 mg Intravenous Q6H [place]    metroNIDAZOLE (FLAGYL) IVPB (premix) 500 mg 100 mL  500 mg Intravenous Q8H    ondansetron (ZOFRAN) injection 4 mg  4 mg Intravenous Q4H PRN    pantoprazole (PROTONIX) EC tablet 40 mg  40 mg Oral Early Morning    senna (SENOKOT) tablet 8.6 mg  1 tablet Oral HS    tamsulosin (FLOMAX) capsule 0.4 mg  0.4 mg Oral Daily With Dinner       Imaging:  See above    Physical Exam:    General appearance: alert and oriented, in no acute distress  Skin:  skin color, texture, turgor normal w/ the exception of thick, brown scaling on RLE  Neurologic: Grossly normal  Head: Normocephalic, without obvious abnormality, atraumatic  Lungs: clear to auscultation bilaterally  Heart: regular rate and rhythm  Abdomen: soft, non-tender; bowel sounds normal; no masses,  no organomegaly  Extremities:  warm, no cyanosis or clubbing, 2+ RLE edema; L BKA    Wound/Incision:  R heel wound w/ drsg dry and intact    Pulse exam:  Radial: Right: 2+ Left[de-identified] 2+      CARLTON Stallings  10/24/2023

## 2023-10-24 NOTE — ASSESSMENT & PLAN NOTE
Acute hypoxic respiratory distress possibly secondary to atelectasis from generalized weakness and pain  Chest x-ray negative for any acute cardiopulmonary disease  Was initially requiring 2 L oxygen, since weaned off: Currently adequate oxygenation on room air

## 2023-10-24 NOTE — ANESTHESIA PREPROCEDURE EVALUATION
Procedure:  (AKA) (Right: Leg Upper)    Relevant Problems   CARDIO   (+) CAD (coronary artery disease)   (+) Hyperlipidemia   (+) Hypertensive heart disease with congestive heart failure (HCC)   (+) Triple vessel coronary artery disease s/p CABG 2018      ENDO   (+) Type 2 diabetes mellitus with diabetic neuropathy, with long-term current use of insulin (HCC)          NEURO/PSYCH           (+) Diabetic gastroparesis       PULMONARY   (+) Obstructive sleep apnea     (+) PAD w/history L BKA     Per cardiology note -   Patient is okay to proceed with planned above-knee amputation surgery. He does have elevated risk for perioperative MI and will need close monitoring.     -- He will need very close perioperative hemodynamic monitoring and anesthesia intervention, with all steps to avoid  hypovolemia, hypotension,  anemia, tachycardia   and vascular congestion. request to maintain hemoglobin at or greater than 9.0 g/dL  and optimal electrolyte levels ( potassium close to 4.0 or greater, magnesium around 2.0 or greater). May need vasopressor support if necessary to augment blood pressure and keep mean arterial pressure close to 70 mmHg. -- Continue aspirin therapy. Continue statin therapy. -- Request to check ECG after surgery. Please reach out to cardiology team if there is any change in clinical status or if there are any questions or concerns.-Postoperative. ECHO 10/22/23  Left Ventricle Left ventricular cavity size is normal. Wall thickness is moderately increased. The left ventricular ejection fraction is 61%. Systolic function is normal.  Wall motion cannot be accurately assessed. Diastolic function is mildly abnormal, consistent with grade I (abnormal) relaxation. Right Ventricle Right ventricular cavity size is normal. Systolic function is mildly reduced. Wall thickness is normal.   Left Atrium The atrium is normal in size. Right Atrium The atrium is normal in size.    Aortic Valve The aortic valve is trileaflet. The leaflets are not thickened. The leaflets are not calcified. The leaflets exhibit normal mobility. There is no evidence of regurgitation. The aortic valve has no significant stenosis. Mitral Valve There is no evidence of regurgitation. There is no evidence of stenosis. The mitral valve has normal structure and normal function. Tricuspid Valve The tricuspid valve was not well visualized. There is no evidence of regurgitation. There is no evidence of stenosis. Pulmonic Valve The pulmonic valve was not well visualized. There is no evidence of regurgitation. There is no evidence of stenosis. Ascending Aorta The aortic root is normal in size. IVC/SVC The inferior vena cava is not well visualized. Pericardium Pericardium was not well visualized. Physical Exam    Airway    Mallampati score: III  TM Distance: <3 FB  Neck ROM: full     Dental       Cardiovascular  Rhythm: regular    Pulmonary   Breath sounds clear to auscultation    Other Findings        Anesthesia Plan  ASA Score- 4     Anesthesia Type- general with ASA Monitors. Additional Monitors: arterial line. Airway Plan: ETT. Plan Factors-    Chart reviewed. Existing labs reviewed. Induction- intravenous. Postoperative Plan- Plan for postoperative opioid use. Planned trial extubation    Informed Consent- Anesthetic plan and risks discussed with patient.

## 2023-10-24 NOTE — ASSESSMENT & PLAN NOTE
Patient is a 50-year-old male with past medical history significant for diabetes, hypertension, hyperlipidemia, and peripheral arterial disease who presented to the ER with sepsis     She presented with sepsis, present on admission, with tachycardia, and leukocytosis   Sepsis secondary to bacteremia secondary to right heel wound   Patient was followed by the infectious disease team, and podiatry teams  Planning for right AKA: Scheduled for tomorrow  Continue IV antibiotics

## 2023-10-24 NOTE — ASSESSMENT & PLAN NOTE
Was previously prescribed midodrine 2.5 mg 3 times daily however only takes this as needed  Pressure currently adequate

## 2023-10-24 NOTE — ASSESSMENT & PLAN NOTE
Patient presented with bacteremia with Proteus and Bacteroides   Secondary to right foot wound with gangrene  Continue antibiotics per ID, with ceftriaxone and Flagyl

## 2023-10-24 NOTE — ASSESSMENT & PLAN NOTE
Patient presented with gangrene of the right heel  Had gotten progressively worse since his previous admission 8/2023  Patient was evaluated by podiatry and vascular surgery:  Foot deemed nonsalvageable  AKA planned by vascular surgery scheduled tomorrow

## 2023-10-24 NOTE — ASSESSMENT & PLAN NOTE
57 yo male former smoker (quit 1994) with hx of DM II, CAD S/P CABG w/ R GSV, L BKA (by Dr. Gato Valle 8/3/18 for tissue loss), PAD and chronic R heel ulcer presented to [facility] on 10/19/23 w/ weakness, chills, nausea and vomiting. Pt admitted w/ worsening R foot wound. R foot xray shows large ulcer overlying plantar aspect of calcaneous. CT RLE shows severe diffuse subcutaneous edema without subcutaneous gas; (-) for OM. Pt seen in consult by podiatry for R heel wound. Per podiatry, limb salvage is unlikely and pt will need amputation of RLE. Vascular surgery consulted for higher level amputation. Recommending above-knee amputation in setting of infection, severe lymphedema, skin changes, and existing contralateral amputation. Pt seen by cardiology for cardiac risk stratification. Per cardiology, "pt is okay to proceed with planned above-knee amputation surgery. He does have elevated risk for perioperative MI and will need close monitoring."     Diagnostics:   -10/22/23: Echo: LVEF 77% w/ normal systolic function and mildly abnormal diastolic dysfunction  -ISAK 10/20/23: R YUDITH: 0.95/7/-; Diffuse atherosclerotic disease, 50-75% prox SFA x 2; unable to visualize calf vessels due to edema, depth  -CT RLE 10/19/23: severe diffuse subcutaneous edema without subcutaneous gas; no OM. -Xray R foot 10/19/23: large ulcer overlying plantar aspect of calcaneous. Plan:  -Nonsalvageable RLE secondary to infected heal wound, per podiatry  -Sepsis bacteremia: BC (+) proteus; continue rocephin and flagyl per SLIM  -Per cardiology, okay to proceed w/ surgery. Pt has an elevated risk for latrell-op MI and will need close monitoring  -Plan for R AKA; scheduled for Wednesday (10/25).   -NPO after MN for OR  -Continue wound care per podiatry  -Continue ASA, plavix and lipitor  -Maintain good diabetes control  -Discussed w/ Dr. Maria Del Rosario Esparza

## 2023-10-24 NOTE — PROGRESS NOTES
[facility]  Progress Note  Name: Trevor Herman  MRN: [id number]  Unit/Bed#: E4 -01 I Date of Admission: 10/19/2023   Date of Service: 10/24/2023 I Hospital Day: 5    Assessment/Plan   * Sepsis [facility])  Assessment & Plan  Patient is a 62-year-old male with past medical history significant for diabetes, hypertension, hyperlipidemia, and peripheral arterial disease who presented to the ER with sepsis     She presented with sepsis, present on admission, with tachycardia, and leukocytosis   Sepsis secondary to bacteremia secondary to right heel wound   Patient was followed by the infectious disease team, and podiatry teams  Planning for right AKA: Scheduled for tomorrow  Continue IV antibiotics    Gangrene [facility])  Assessment & Plan  Patient presented with gangrene of the right heel  Had gotten progressively worse since his previous admission 8/2023  Patient was evaluated by podiatry and vascular surgery:  Foot deemed nonsalvageable  AKA planned by vascular surgery scheduled tomorrow    Bacteremia due to Proteus species  Assessment & Plan  Patient presented with bacteremia with Proteus and Bacteroides   Secondary to right foot wound with gangrene  Continue antibiotics per ID, with ceftriaxone and Flagyl    PAD (peripheral artery disease) ([address])  Assessment & Plan  Patient follows with vascular surgery outpatient  Continue aspirin, Plavix, statin  He has diffuse disease proximally so vascular has scheduled AKA for tomorrow  Doppler: "Diffuse atherosclerotic arterial disease noted, with a 50-75% stenosis in the  proximal superficial femoral artery and a 50-75% stenosis in the proximal superficial femoral artery"    Elevated troponin  Assessment & Plan  Patient had an elevated troponin, secondary to non-MI troponin elevation secondary to sepsis   Patient was evaluated by the cardiology team  No evidence of acute ischemia  Continue home medication regimen with aspirin, Plavix, statin    S/P CABG x 3  Assessment & Plan  History of CAD status post CABG  Continue on aspirin, Plavix, statin  Elevated troponin on admission attributed to non-MI troponin elevation secondary to sepsis  He was evaluated by the cardiology team, no evidence of ischemia  Continue home regimen  Telemetry perioperatively    Chronic heart failure with preserved ejection fraction [facility])  Assessment & Plan  Wt Readings from Last 3 Encounters:   10/24/23 112 kg (245 lb 13 oz)   09/02/23 118 kg (259 lb 7.7 oz)   07/31/23 118 kg (260 lb)     Patient has a history of chronic diastolic congestive heart failure  Most recent 2D echocardiogram 10/22: Left ventricular ejection fraction 61%. Grade 1 diastolic dysfunction.   Patient was placed on IV Lasix due to lower extremity edema  Lungs clear to auscultation  transitioned back to home Lasix 40 mg p.o. twice daily  Monitor closely perioperatively    Diabetic gastroparesis   Assessment & Plan  Yesterday patient noted he has been retching approximately every 15 minutes around-the-clock since admission  Possibly secondary to gastroparesis, versus intolerance to antibiotics especially Flagyl  scheduled Reglan 24 hours with complete resolution of his symptoms: Patient notes he is tolerating p.o. today without any difficulty  QTc appropriate on admission    Orthostatic hypotension  Assessment & Plan  Was previously prescribed midodrine 2.5 mg 3 times daily however only takes this as needed  Pressure currently adequate off of scheduled midodrine: Monitor perioperatively    Type 2 diabetes mellitus with diabetic neuropathy, with long-term current use of insulin [facility])  Assessment & Plan  Lab Results   Component Value Date    HGBA1C 6.7 (A) 02/02/2023       Recent Labs     10/23/23  2221 10/24/23  0845 10/24/23  1153 10/24/23  1712   POCGLU 105 104 102 113     Patient with poor p.o. intake and borderline blood sugars  decreased regimen to avoid hypoglycemia  Home regimen is Lantus 40 units twice daily: Patient with low-normal blood sugars despite Lantus being held last night and this morning   hold Lantus tonight to avoid hypoglycemia intraoperatively while he was n.p.o. Checks and sliding scale insulin    Respiratory insufficiency-resolved as of 10/23/2023  Assessment & Plan  Acute hypoxic respiratory distress possibly secondary to atelectasis from generalized weakness and pain  Chest x-ray negative for any acute cardiopulmonary disease  Was initially requiring 2 L oxygen, since weaned off: Currently adequate oxygenation on room air                 Family: Updated patient's wife at the bedside. Reviewed test results, and treatment plan. Answered all questions    Discussed with patient's nurse and   Discussed with vascular surgery: OR tomorrow    VTE Pharmacologic Prophylaxis: Enoxaparin (Lovenox)  VTE Mechanical Prophylaxis: sequential compression device        Certification Statement: The patient will continue to require additional inpatient hospital stay due to need for further acute intervention for OR    Status: inpatient     ===================================================================    Subjective:  Patient denies any current complaints. He notes his stomach was much better today. He states from the time he awakened he did not have any nausea or retching. He feels the Reglan helped him. Tolerate all meals today, ate oatmeal for breakfast, entire sandwich and soup for lunch. He denies any abdominal pain or cramping. Denies any pain anywhere at all. Denies any shortness of breath or cough. Denies any chest pain. Denies any abdominal pain, or GI upset. Notes he is quite anxious and stressed about the procedure tomorrow, as well as what his future living situation will entail. Physical Exam:   Temp:  [97.3 °F (36.3 °C)-98 °F (36.7 °C)] 97.6 °F (36.4 °C)  HR:  [69-74] 70  Resp:  [18] 18  BP: (106-159)/(57-72) 159/72    Gen:  Pleasant, non-tachypnic, non-dyspnic. Conversant. Heart: regular rate and rhythm, S1S2 present, no murmur, rub or gallop  Lungs: clear to ausculatation bilaterally. No wheezing, crackles, or rhonchi. No accessory muscle use or respiratory distress. Abd: soft, non-tender, non-distended. NABS, no guarding, rebound or peritoneal signs. Neuro: awake, alert    Skin: warm and dry: no petechiae, purpura and rash. LABS:   Results from last 7 days   Lab Units 10/23/23  0641 10/21/23  0617 10/20/23  0505   WBC Thousand/uL 7.63 8.50 8.12   HEMOGLOBIN g/dL 11.7* 12.1 11.3*   HEMATOCRIT % 36.5 38.7 35.8*   PLATELETS Thousands/uL 317 301 319     Results from last 7 days   Lab Units 10/23/23  0641 10/21/23  0617 10/20/23  0505   POTASSIUM mmol/L 3.6 3.7 4.0   CHLORIDE mmol/L 96 93* 96   CO2 mmol/L 31 31 27   BUN mg/dL 28* 16 13   CREATININE mg/dL 0.86 0.91 0.82   CALCIUM mg/dL 8.4 8.3* 8.1*       Hospital Data:    10/22: Echocardiogram:    Left Ventricle: Left ventricular cavity size is normal. Wall thickness is moderately increased. The left ventricular ejection fraction is 61%. Systolic function is normal. Wall motion cannot be accurately assessed. Diastolic function is mildly abnormal, consistent with grade I (abnormal) relaxation. Right Ventricle: Systolic function is mildly reduced. 10/20 LE ADS  IGHT LOWER LIMB:  Diffuse atherosclerotic arterial disease noted, with a 50-75% stenosis in the  proximal superficial femoral artery and a 50-75% stenosis in the proximal  superficial femoral artery. Unable to visualize calf vessels due to edema, depth, induration and thickened,  scaly skin. Ankle/Brachial index: supra-normal, consistent with poorly compressible  vessels. Prior . 95 (2020 study). Metatarsal pressure of  7 mm Hg, however, may be unreliable. Great toe pressure of unobtainable, due to flatline ppg waveform. PVR/ PPG tracings are dampened at the ankle, severely attenuated in the  metatarsal/toe.      LEFT LOWER LIMB:  BKA  Compared to previous study on 4/18/2023, there are changes and findings as  described above. 10/19: CT right lower extremity  Severe diffuse subcutaneous edema without subcutaneous gas. Limited evaluation for abscess without IV contrast; no apparent discrete collection. No CT signs of osteomyelitis. 10/19 chest x-ray  No acute cardiopulmonary disease. 10/19 right foot x-ray  No acute osseous abnormality. Large ulcer overlying the plantar aspect of the calcaneus which appears slightly larger than on the prior study. Microbiology  10/19: Blood culture: Proteus, Bacteroides x2  10/19: Negative COVID, influenza, RSV        ---------------------------------------------------------------------------------------------------------------  This note has been constructed using a voice recognition system.

## 2023-10-24 NOTE — ASSESSMENT & PLAN NOTE
Patient had an elevated troponin, secondary to non-MI troponin elevation secondary to sepsis   Patient was evaluated by the cardiology team  No evidence of acute ischemia  Continue home medication regimen with aspirin, Plavix, statin

## 2023-10-24 NOTE — ASSESSMENT & PLAN NOTE
Wt Readings from Last 3 Encounters:   10/23/23 112 kg (247 lb 12.8 oz)   09/02/23 118 kg (259 lb 7.7 oz)   07/31/23 118 kg (260 lb)     Patient has a history of chronic diastolic congestive heart failure  Most recent 2D echocardiogram 10/22: Left ventricular ejection fraction 61%. Grade 1 diastolic dysfunction.   Patient was placed on IV Lasix due to lower extremity edema  Lungs clear to auscultation  Will transition back to home Lasix 40 mg p.o. twice daily

## 2023-10-24 NOTE — ASSESSMENT & PLAN NOTE
Wt Readings from Last 3 Encounters:   10/24/23 112 kg (245 lb 13 oz)   09/02/23 118 kg (259 lb 7.7 oz)   07/31/23 118 kg (260 lb)     Patient has a history of chronic diastolic congestive heart failure  Most recent 2D echocardiogram 10/22: Left ventricular ejection fraction 61%. Grade 1 diastolic dysfunction.   Patient was placed on IV Lasix due to lower extremity edema  Lungs clear to auscultation  transitioned back to home Lasix 40 mg p.o. twice daily  Monitor closely perioperatively

## 2023-10-24 NOTE — ASSESSMENT & PLAN NOTE
History of CAD status post CABG  Continue on aspirin, Plavix, statin  Elevated troponin on admission attributed to non-MI troponin elevation secondary to sepsis  He was abided by the cardiology team, no evidence of ischemia  Continue home regimen

## 2023-10-24 NOTE — ASSESSMENT & PLAN NOTE
Patient follows with vascular surgery outpatient  Continue aspirin, Plavix, statin  He has diffuse disease proximally so vascular has scheduled AKA for tomorrow  Doppler: "Diffuse atherosclerotic arterial disease noted, with a 50-75% stenosis in the  proximal superficial femoral artery and a 50-75% stenosis in the proximal superficial femoral artery"

## 2023-10-24 NOTE — ASSESSMENT & PLAN NOTE
Patient presented with gangrene of the right heel  Had gotten progressively worse since his previous admission 8/2023  Patient was evaluated by podiatry and vascular surgery:  Foot deemed nonsalvageable  AKA planned by vascular surgery

## 2023-10-24 NOTE — ASSESSMENT & PLAN NOTE
Was previously prescribed midodrine 2.5 mg 3 times daily however only takes this as needed  Pressure currently adequate off of scheduled midodrine: Monitor perioperatively

## 2023-10-24 NOTE — ASSESSMENT & PLAN NOTE
Yesterday patient noted he has been retching approximately every 15 minutes around-the-clock since admission  Possibly secondary to gastroparesis, versus intolerance to antibiotics especially Flagyl  scheduled Reglan 24 hours with complete resolution of his symptoms: Patient notes he is tolerating p.o. today without any difficulty  QTc appropriate on admission

## 2023-10-24 NOTE — PLAN OF CARE
Problem: Potential for Falls  Goal: Patient will remain free of falls  Description: INTERVENTIONS:  - Educate patient/family on patient safety including physical limitations  - Instruct patient to call for assistance with activity   - Consult OT/PT to assist with strengthening/mobility   - Keep Call bell within reach  - Keep bed low and locked with side rails adjusted as appropriate  - Keep care items and personal belongings within reach  - Initiate and maintain comfort rounds  - Make Fall Risk Sign visible to staff  - Offer Toileting every 2 Hours, in advance of need  - Initiate/Maintain bed alarm  - Obtain necessary fall risk management equipment: alarm   - Apply yellow socks and bracelet for high fall risk patients  - Consider moving patient to room near nurses station  Outcome: Progressing     Problem: MOBILITY - ADULT  Goal: Maintain or return to baseline ADL function  Description: INTERVENTIONS:  -  Assess patient's ability to carry out ADLs; assess patient's baseline for ADL function and identify physical deficits which impact ability to perform ADLs (bathing, care of mouth/teeth, toileting, grooming, dressing, etc.)  - Assess/evaluate cause of self-care deficits   - Assess range of motion  - Assess patient's mobility; develop plan if impaired  - Assess patient's need for assistive devices and provide as appropriate  - Encourage maximum independence but intervene and supervise when necessary  - Involve family in performance of ADLs  - Assess for home care needs following discharge   - Consider OT consult to assist with ADL evaluation and planning for discharge  - Provide patient education as appropriate  Outcome: Progressing  Goal: Maintains/Returns to pre admission functional level  Description: INTERVENTIONS:  - Perform BMAT or MOVE assessment daily.   - Set and communicate daily mobility goal to care team and patient/family/caregiver.    - Collaborate with rehabilitation services on mobility goals if consulted  - Perform Range of Motion 3 times a day. - Reposition patient every 2 hours.   - Dangle patient 3 times a day  - Stand patient 3 times a day  - Ambulate patient 3 times a day  - Out of bed to chair 3 times a day   - Out of bed for meals 3 times a day  - Out of bed for toileting  - Record patient progress and toleration of activity level   Outcome: Progressing     Problem: PAIN - ADULT  Goal: Verbalizes/displays adequate comfort level or baseline comfort level  Description: Interventions:  - Encourage patient to monitor pain and request assistance  - Assess pain using appropriate pain scale  - Administer analgesics based on type and severity of pain and evaluate response  - Implement non-pharmacological measures as appropriate and evaluate response  - Consider cultural and social influences on pain and pain management  - Notify physician/advanced practitioner if interventions unsuccessful or patient reports new pain  Outcome: Progressing     Problem: INFECTION - ADULT  Goal: Absence or prevention of progression during hospitalization  Description: INTERVENTIONS:  - Assess and monitor for signs and symptoms of infection  - Monitor lab/diagnostic results  - Monitor all insertion sites, i.e. indwelling lines, tubes, and drains  - Monitor endotracheal if appropriate and nasal secretions for changes in amount and color  - [place] appropriate cooling/warming therapies per order  - Administer medications as ordered  - Instruct and encourage patient and family to use good hand hygiene technique  - Identify and instruct in appropriate isolation precautions for identified infection/condition  Outcome: Progressing     Problem: SAFETY ADULT  Goal: Patient will remain free of falls  Description: INTERVENTIONS:  - Educate patient/family on patient safety including physical limitations  - Instruct patient to call for assistance with activity   - Consult OT/PT to assist with strengthening/mobility   - Keep Call bell within reach  - Keep bed low and locked with side rails adjusted as appropriate  - Keep care items and personal belongings within reach  - Initiate and maintain comfort rounds  - Make Fall Risk Sign visible to staff  - Offer Toileting every 2 Hours, in advance of need  - Initiate/Maintain bed alarm  - Obtain necessary fall risk management equipment: alarm   - Apply yellow socks and bracelet for high fall risk patients  - Consider moving patient to room near nurses station  Outcome: Progressing  Goal: Maintain or return to baseline ADL function  Description: INTERVENTIONS:  -  Assess patient's ability to carry out ADLs; assess patient's baseline for ADL function and identify physical deficits which impact ability to perform ADLs (bathing, care of mouth/teeth, toileting, grooming, dressing, etc.)  - Assess/evaluate cause of self-care deficits   - Assess range of motion  - Assess patient's mobility; develop plan if impaired  - Assess patient's need for assistive devices and provide as appropriate  - Encourage maximum independence but intervene and supervise when necessary  - Involve family in performance of ADLs  - Assess for home care needs following discharge   - Consider OT consult to assist with ADL evaluation and planning for discharge  - Provide patient education as appropriate  Outcome: Progressing  Goal: Maintains/Returns to pre admission functional level  Description: INTERVENTIONS:  - Perform BMAT or MOVE assessment daily.   - Set and communicate daily mobility goal to care team and patient/family/caregiver. - Collaborate with rehabilitation services on mobility goals if consulted  - Perform Range of Motion 3 times a day. - Reposition patient every 2 hours.   - Dangle patient 3 times a day  - Stand patient 3 times a day  - Ambulate patient 3 times a day  - Out of bed to chair 3 times a day   - Out of bed for meals 3 times a day  - Out of bed for toileting  - Record patient progress and toleration of activity level   Outcome: Progressing     Problem: DISCHARGE PLANNING  Goal: Discharge to home or other facility with appropriate resources  Description: INTERVENTIONS:  - Identify barriers to discharge w/patient and caregiver  - Arrange for needed discharge resources and transportation as appropriate  - Identify discharge learning needs (meds, wound care, etc.)  - Arrange for interpretive services to assist at discharge as needed  - Refer to Case Management Department for coordinating discharge planning if the patient needs post-hospital services based on physician/advanced practitioner order or complex needs related to functional status, cognitive ability, or social support system  Outcome: Progressing     Problem: Knowledge Deficit  Goal: Patient/family/caregiver demonstrates understanding of disease process, treatment plan, medications, and discharge instructions  Description: Complete learning assessment and assess knowledge base.   Interventions:  - Provide teaching at level of understanding  - Provide teaching via preferred learning methods  Outcome: Progressing     Problem: Prexisting or High Potential for Compromised Skin Integrity  Goal: Skin integrity is maintained or improved  Description: INTERVENTIONS:  - Identify patients at risk for skin breakdown  - Assess and monitor skin integrity  - Assess and monitor nutrition and hydration status  - Monitor labs   - Assess for incontinence   - Turn and reposition patient  - Assist with mobility/ambulation  - Relieve pressure over bony prominences  - Avoid friction and shearing  - Provide appropriate hygiene as needed including keeping skin clean and dry  - Evaluate need for skin moisturizer/barrier cream  - Collaborate with interdisciplinary team   - Patient/family teaching  - Consider wound care consult   Outcome: Progressing

## 2023-10-24 NOTE — ASSESSMENT & PLAN NOTE
History of CAD status post CABG  Continue on aspirin, Plavix, statin  Elevated troponin on admission attributed to non-MI troponin elevation secondary to sepsis  He was evaluated by the cardiology team, no evidence of ischemia  Continue home regimen  Telemetry perioperatively

## 2023-10-25 ENCOUNTER — ANESTHESIA (INPATIENT)
Dept: PERIOP | Facility: HOSPITAL | Age: 63
DRG: 616 | End: 2023-10-25
Payer: COMMERCIAL

## 2023-10-25 LAB
ANION GAP SERPL CALCULATED.3IONS-SCNC: 7 MMOL/L
ATRIAL RATE: 78 BPM
BACTERIA BLD CULT: ABNORMAL
BASOPHILS # BLD AUTO: 0.06 THOUSANDS/ÂΜL (ref 0–0.1)
BASOPHILS NFR BLD AUTO: 1 % (ref 0–1)
BUN SERPL-MCNC: 18 MG/DL (ref 5–25)
CALCIUM SERPL-MCNC: 8.5 MG/DL (ref 8.4–10.2)
CHLORIDE SERPL-SCNC: 98 MMOL/L (ref 96–108)
CO2 SERPL-SCNC: 30 MMOL/L (ref 21–32)
CREAT SERPL-MCNC: 0.87 MG/DL (ref 0.6–1.3)
EOSINOPHIL # BLD AUTO: 0.09 THOUSAND/ÂΜL (ref 0–0.61)
EOSINOPHIL NFR BLD AUTO: 1 % (ref 0–6)
ERYTHROCYTE [DISTWIDTH] IN BLOOD BY AUTOMATED COUNT: 14.6 % (ref 11.6–15.1)
GFR SERPL CREATININE-BSD FRML MDRD: 91 ML/MIN/1.73SQ M
GLUCOSE SERPL-MCNC: 101 MG/DL (ref 65–140)
GLUCOSE SERPL-MCNC: 114 MG/DL (ref 65–140)
GLUCOSE SERPL-MCNC: 114 MG/DL (ref 65–140)
GLUCOSE SERPL-MCNC: 120 MG/DL (ref 65–140)
GLUCOSE SERPL-MCNC: 134 MG/DL (ref 65–140)
GRAM STN SPEC: ABNORMAL
GRAM STN SPEC: ABNORMAL
HCT VFR BLD AUTO: 39.3 % (ref 36.5–49.3)
HGB BLD-MCNC: 12.4 G/DL (ref 12–17)
IMM GRANULOCYTES # BLD AUTO: 0.06 THOUSAND/UL (ref 0–0.2)
IMM GRANULOCYTES NFR BLD AUTO: 1 % (ref 0–2)
LYMPHOCYTES # BLD AUTO: 1.28 THOUSANDS/ÂΜL (ref 0.6–4.47)
LYMPHOCYTES NFR BLD AUTO: 13 % (ref 14–44)
MCH RBC QN AUTO: 26.6 PG (ref 26.8–34.3)
MCHC RBC AUTO-ENTMCNC: 31.6 G/DL (ref 31.4–37.4)
MCV RBC AUTO: 84 FL (ref 82–98)
MONOCYTES # BLD AUTO: 0.53 THOUSAND/ÂΜL (ref 0.17–1.22)
MONOCYTES NFR BLD AUTO: 5 % (ref 4–12)
NEUTROPHILS # BLD AUTO: 7.89 THOUSANDS/ÂΜL (ref 1.85–7.62)
NEUTS SEG NFR BLD AUTO: 79 % (ref 43–75)
NRBC BLD AUTO-RTO: 0 /100 WBCS
P AXIS: 46 DEGREES
PLATELET # BLD AUTO: 361 THOUSANDS/UL (ref 149–390)
PMV BLD AUTO: 9.3 FL (ref 8.9–12.7)
POTASSIUM SERPL-SCNC: 3.6 MMOL/L (ref 3.5–5.3)
PR INTERVAL: 158 MS
PROTEUS SP DNA BLD POS QL NAA+NON-PROBE: DETECTED
QRS AXIS: -23 DEGREES
QRSD INTERVAL: 108 MS
QT INTERVAL: 424 MS
QTC INTERVAL: 483 MS
RBC # BLD AUTO: 4.67 MILLION/UL (ref 3.88–5.62)
SODIUM SERPL-SCNC: 135 MMOL/L (ref 135–147)
T WAVE AXIS: 120 DEGREES
VENTRICULAR RATE: 78 BPM
WBC # BLD AUTO: 9.91 THOUSAND/UL (ref 4.31–10.16)

## 2023-10-25 PROCEDURE — 93010 ELECTROCARDIOGRAM REPORT: CPT | Performed by: INTERNAL MEDICINE

## 2023-10-25 PROCEDURE — 85025 COMPLETE CBC W/AUTO DIFF WBC: CPT | Performed by: INTERNAL MEDICINE

## 2023-10-25 PROCEDURE — 88307 TISSUE EXAM BY PATHOLOGIST: CPT | Performed by: PATHOLOGY

## 2023-10-25 PROCEDURE — 0Y6C0Z3 DETACHMENT AT RIGHT UPPER LEG, LOW, OPEN APPROACH: ICD-10-PCS | Performed by: SURGERY

## 2023-10-25 PROCEDURE — 82948 REAGENT STRIP/BLOOD GLUCOSE: CPT

## 2023-10-25 PROCEDURE — 99232 SBSQ HOSP IP/OBS MODERATE 35: CPT | Performed by: INTERNAL MEDICINE

## 2023-10-25 PROCEDURE — 2W1SX7Z COMPRESSION OF RIGHT FOOT USING INTERMITTENT PRESSURE DEVICE: ICD-10-PCS | Performed by: SURGERY

## 2023-10-25 PROCEDURE — 88311 DECALCIFY TISSUE: CPT | Performed by: PATHOLOGY

## 2023-10-25 PROCEDURE — 27590 AMPUTATE LEG AT THIGH: CPT | Performed by: SURGERY

## 2023-10-25 PROCEDURE — 80048 BASIC METABOLIC PNL TOTAL CA: CPT | Performed by: INTERNAL MEDICINE

## 2023-10-25 PROCEDURE — 93005 ELECTROCARDIOGRAM TRACING: CPT

## 2023-10-25 RX ORDER — LIDOCAINE HYDROCHLORIDE 20 MG/ML
INJECTION, SOLUTION EPIDURAL; INFILTRATION; INTRACAUDAL; PERINEURAL AS NEEDED
Status: DISCONTINUED | OUTPATIENT
Start: 2023-10-25 | End: 2023-10-25

## 2023-10-25 RX ORDER — FENTANYL CITRATE 50 UG/ML
INJECTION, SOLUTION INTRAMUSCULAR; INTRAVENOUS AS NEEDED
Status: DISCONTINUED | OUTPATIENT
Start: 2023-10-25 | End: 2023-10-25

## 2023-10-25 RX ORDER — ROPIVACAINE HYDROCHLORIDE 5 MG/ML
INJECTION, SOLUTION EPIDURAL; INFILTRATION; PERINEURAL AS NEEDED
Status: DISCONTINUED | OUTPATIENT
Start: 2023-10-25 | End: 2023-10-25

## 2023-10-25 RX ORDER — ALBUMIN, HUMAN INJ 5% 5 %
SOLUTION INTRAVENOUS CONTINUOUS PRN
Status: DISCONTINUED | OUTPATIENT
Start: 2023-10-25 | End: 2023-10-25

## 2023-10-25 RX ORDER — METOCLOPRAMIDE HYDROCHLORIDE 5 MG/ML
5 INJECTION INTRAMUSCULAR; INTRAVENOUS EVERY 6 HOURS SCHEDULED
Status: COMPLETED | OUTPATIENT
Start: 2023-10-25 | End: 2023-10-26

## 2023-10-25 RX ORDER — MAGNESIUM HYDROXIDE 1200 MG/15ML
LIQUID ORAL AS NEEDED
Status: DISCONTINUED | OUTPATIENT
Start: 2023-10-25 | End: 2023-10-25 | Stop reason: HOSPADM

## 2023-10-25 RX ORDER — FENTANYL CITRATE/PF 50 MCG/ML
25 SYRINGE (ML) INJECTION
Status: DISCONTINUED | OUTPATIENT
Start: 2023-10-25 | End: 2023-10-25 | Stop reason: HOSPADM

## 2023-10-25 RX ORDER — PROPOFOL 10 MG/ML
INJECTION, EMULSION INTRAVENOUS AS NEEDED
Status: DISCONTINUED | OUTPATIENT
Start: 2023-10-25 | End: 2023-10-25

## 2023-10-25 RX ORDER — SODIUM CHLORIDE 9 MG/ML
125 INJECTION, SOLUTION INTRAVENOUS CONTINUOUS
Status: DISCONTINUED | OUTPATIENT
Start: 2023-10-25 | End: 2023-10-25

## 2023-10-25 RX ORDER — OXYCODONE HYDROCHLORIDE 10 MG/1
10 TABLET ORAL EVERY 4 HOURS PRN
Status: DISCONTINUED | OUTPATIENT
Start: 2023-10-25 | End: 2023-11-01

## 2023-10-25 RX ORDER — CEFAZOLIN SODIUM 2 G/50ML
2000 SOLUTION INTRAVENOUS ONCE
Status: COMPLETED | OUTPATIENT
Start: 2023-10-25 | End: 2023-10-25

## 2023-10-25 RX ORDER — ROCURONIUM BROMIDE 10 MG/ML
INJECTION, SOLUTION INTRAVENOUS AS NEEDED
Status: DISCONTINUED | OUTPATIENT
Start: 2023-10-25 | End: 2023-10-25

## 2023-10-25 RX ORDER — CHLORHEXIDINE GLUCONATE ORAL RINSE 1.2 MG/ML
15 SOLUTION DENTAL ONCE
Status: COMPLETED | OUTPATIENT
Start: 2023-10-25 | End: 2023-10-25

## 2023-10-25 RX ORDER — ONDANSETRON 2 MG/ML
4 INJECTION INTRAMUSCULAR; INTRAVENOUS ONCE AS NEEDED
Status: DISCONTINUED | OUTPATIENT
Start: 2023-10-25 | End: 2023-10-25 | Stop reason: HOSPADM

## 2023-10-25 RX ORDER — MIDAZOLAM HYDROCHLORIDE 2 MG/2ML
INJECTION, SOLUTION INTRAMUSCULAR; INTRAVENOUS AS NEEDED
Status: DISCONTINUED | OUTPATIENT
Start: 2023-10-25 | End: 2023-10-25

## 2023-10-25 RX ORDER — ONDANSETRON 2 MG/ML
INJECTION INTRAMUSCULAR; INTRAVENOUS AS NEEDED
Status: DISCONTINUED | OUTPATIENT
Start: 2023-10-25 | End: 2023-10-25

## 2023-10-25 RX ORDER — OXYCODONE HYDROCHLORIDE 5 MG/1
5 TABLET ORAL EVERY 4 HOURS PRN
Status: DISCONTINUED | OUTPATIENT
Start: 2023-10-25 | End: 2023-11-01 | Stop reason: HOSPADM

## 2023-10-25 RX ORDER — EPHEDRINE SULFATE 50 MG/ML
INJECTION INTRAVENOUS AS NEEDED
Status: DISCONTINUED | OUTPATIENT
Start: 2023-10-25 | End: 2023-10-25

## 2023-10-25 RX ADMIN — ALBUMIN (HUMAN): 12.5 INJECTION, SOLUTION INTRAVENOUS at 13:05

## 2023-10-25 RX ADMIN — METRONIDAZOLE 500 MG: 500 INJECTION, SOLUTION INTRAVENOUS at 23:58

## 2023-10-25 RX ADMIN — PANTOPRAZOLE SODIUM 40 MG: 40 TABLET, DELAYED RELEASE ORAL at 05:58

## 2023-10-25 RX ADMIN — ONDANSETRON 4 MG: 2 INJECTION INTRAMUSCULAR; INTRAVENOUS at 13:18

## 2023-10-25 RX ADMIN — SODIUM CHLORIDE: 0.9 INJECTION, SOLUTION INTRAVENOUS at 12:15

## 2023-10-25 RX ADMIN — ROPIVACAINE HYDROCHLORIDE 20 ML: 5 INJECTION EPIDURAL; INFILTRATION; PERINEURAL at 11:58

## 2023-10-25 RX ADMIN — PROPOFOL 150 MG: 10 INJECTION, EMULSION INTRAVENOUS at 12:20

## 2023-10-25 RX ADMIN — ROCURONIUM BROMIDE 10 MG: 10 INJECTION, SOLUTION INTRAVENOUS at 12:38

## 2023-10-25 RX ADMIN — ATORVASTATIN CALCIUM 80 MG: 80 TABLET, FILM COATED ORAL at 17:04

## 2023-10-25 RX ADMIN — ASPIRIN 81 MG: 81 TABLET, COATED ORAL at 08:24

## 2023-10-25 RX ADMIN — ALPRAZOLAM 0.25 MG: 0.25 TABLET ORAL at 21:29

## 2023-10-25 RX ADMIN — METOCLOPRAMIDE 5 MG: 5 INJECTION, SOLUTION INTRAMUSCULAR; INTRAVENOUS at 17:04

## 2023-10-25 RX ADMIN — DOCUSATE SODIUM 100 MG: 100 CAPSULE, LIQUID FILLED ORAL at 08:24

## 2023-10-25 RX ADMIN — LIDOCAINE HYDROCHLORIDE 60 MG: 20 INJECTION, SOLUTION EPIDURAL; INFILTRATION; INTRACAUDAL at 12:20

## 2023-10-25 RX ADMIN — SUGAMMADEX 300 MG: 100 INJECTION, SOLUTION INTRAVENOUS at 13:18

## 2023-10-25 RX ADMIN — FUROSEMIDE 40 MG: 40 TABLET ORAL at 08:24

## 2023-10-25 RX ADMIN — ALBUMIN (HUMAN): 12.5 INJECTION, SOLUTION INTRAVENOUS at 12:50

## 2023-10-25 RX ADMIN — METOCLOPRAMIDE 5 MG: 5 INJECTION, SOLUTION INTRAMUSCULAR; INTRAVENOUS at 23:12

## 2023-10-25 RX ADMIN — PHENYLEPHRINE HYDROCHLORIDE 20 MCG/MIN: 10 INJECTION INTRAVENOUS at 12:33

## 2023-10-25 RX ADMIN — Medication 6 MG: at 21:29

## 2023-10-25 RX ADMIN — ROCURONIUM BROMIDE 10 MG: 10 INJECTION, SOLUTION INTRAVENOUS at 13:07

## 2023-10-25 RX ADMIN — METRONIDAZOLE 500 MG: 500 INJECTION, SOLUTION INTRAVENOUS at 08:13

## 2023-10-25 RX ADMIN — FENTANYL CITRATE 50 MCG: 50 INJECTION INTRAMUSCULAR; INTRAVENOUS at 11:27

## 2023-10-25 RX ADMIN — FINASTERIDE 5 MG: 5 TABLET, FILM COATED ORAL at 08:24

## 2023-10-25 RX ADMIN — DOCUSATE SODIUM 100 MG: 100 CAPSULE, LIQUID FILLED ORAL at 17:04

## 2023-10-25 RX ADMIN — TAMSULOSIN HYDROCHLORIDE 0.4 MG: 0.4 CAPSULE ORAL at 17:04

## 2023-10-25 RX ADMIN — ROCURONIUM BROMIDE 40 MG: 10 INJECTION, SOLUTION INTRAVENOUS at 12:20

## 2023-10-25 RX ADMIN — MIDAZOLAM 1 MG: 1 INJECTION INTRAMUSCULAR; INTRAVENOUS at 11:24

## 2023-10-25 RX ADMIN — METRONIDAZOLE 500 MG: 500 INJECTION, SOLUTION INTRAVENOUS at 17:07

## 2023-10-25 RX ADMIN — ROPIVACAINE HYDROCHLORIDE 20 ML: 5 INJECTION EPIDURAL; INFILTRATION; PERINEURAL at 12:05

## 2023-10-25 RX ADMIN — PROPOFOL 50 MG: 10 INJECTION, EMULSION INTRAVENOUS at 12:24

## 2023-10-25 RX ADMIN — CHLORHEXIDINE GLUCONATE 15 ML: 1.2 RINSE ORAL at 08:31

## 2023-10-25 RX ADMIN — FENTANYL CITRATE 50 MCG: 50 INJECTION INTRAMUSCULAR; INTRAVENOUS at 11:42

## 2023-10-25 RX ADMIN — CLOPIDOGREL BISULFATE 75 MG: 75 TABLET ORAL at 08:24

## 2023-10-25 RX ADMIN — MIDAZOLAM 1 MG: 1 INJECTION INTRAMUSCULAR; INTRAVENOUS at 11:27

## 2023-10-25 RX ADMIN — EPHEDRINE SULFATE 10 MG: 50 INJECTION, SOLUTION INTRAVENOUS at 12:33

## 2023-10-25 RX ADMIN — FUROSEMIDE 40 MG: 40 TABLET ORAL at 17:04

## 2023-10-25 RX ADMIN — FENTANYL CITRATE 50 MCG: 50 INJECTION INTRAMUSCULAR; INTRAVENOUS at 11:39

## 2023-10-25 RX ADMIN — ACETAMINOPHEN 325MG 650 MG: 325 TABLET ORAL at 06:02

## 2023-10-25 RX ADMIN — SODIUM CHLORIDE: 0.9 INJECTION, SOLUTION INTRAVENOUS at 13:15

## 2023-10-25 RX ADMIN — ONDANSETRON 4 MG: 2 INJECTION INTRAMUSCULAR; INTRAVENOUS at 05:56

## 2023-10-25 RX ADMIN — CEFAZOLIN SODIUM 2000 MG: 2 SOLUTION INTRAVENOUS at 09:07

## 2023-10-25 RX ADMIN — FENTANYL CITRATE 50 MCG: 50 INJECTION INTRAMUSCULAR; INTRAVENOUS at 11:24

## 2023-10-25 NOTE — ASSESSMENT & PLAN NOTE
Wt Readings from Last 3 Encounters:   10/25/23 112 kg (246 lb 0.5 oz)   09/02/23 118 kg (259 lb 7.7 oz)   07/31/23 118 kg (260 lb)     Patient has a history of chronic diastolic congestive heart failure  Most recent 2D echocardiogram 10/22: Left ventricular ejection fraction 61%. Grade 1 diastolic dysfunction.   Patient was placed on IV Lasix due to lower extremity edema  Lungs clear to auscultation  transitioned back to home Lasix 40 mg p.o. twice daily  Monitor closely perioperatively  Currently euvolemic post op

## 2023-10-25 NOTE — PROGRESS NOTES
[address] [facility]  Progress Note  Name: Chelsey Middleton  MRN: [id number]  Unit/Bed#: E4 -01 I Date of Admission: 10/19/2023   Date of Service: 10/25/2023 I Hospital Day: 6    Assessment/Plan   * Sepsis [facility])  Assessment & Plan  Patient is a 68-year-old male with past medical history significant for diabetes, hypertension, hyperlipidemia, and peripheral arterial disease who presented to the ER with sepsis     he presented with sepsis, present on admission, with tachycardia, and leukocytosis   Sepsis secondary to bacteremia secondary to right heel wound   Patient was followed by the infectious disease team, and podiatry teams  AKA performed 10/25  S/p  IV antibiotics:  anticipate surgical cure    Gangrene [facility])  Assessment & Plan  Patient presented with gangrene of the right heel  Had gotten progressively worse since his previous admission 8/2023  Patient was evaluated by podiatry and vascular surgery:  Foot deemed nonsalvageable  AKA performed by vascular surgery 10/25  Cont pain medications    Bacteremia due to Proteus species  Assessment & Plan  Patient presented with bacteremia with Proteus and Bacteroides   Secondary to right foot wound with gangrene  Continue antibiotics per ID, with ceftriaxone and Flagyl- day #7    PAD (peripheral artery disease) ([address])  Assessment & Plan  Patient follows with vascular surgery outpatient  Continue aspirin, Plavix, statin  He has diffuse disease proximally so vascular surgery performed AKA 10/25  Doppler: "Diffuse atherosclerotic arterial disease noted, with a 50-75% stenosis in the  proximal superficial femoral artery and a 50-75% stenosis in the proximal superficial femoral artery"    S/P CABG x 3  Assessment & Plan  History of CAD status post CABG  Continue on aspirin, Plavix, statin  Elevated troponin on admission attributed to non-MI troponin elevation secondary to sepsis  He was evaluated by the cardiology team, no evidence of ischemia  Continue home regimen  Telemetry perioperatively  Will check post-op EKG      Chronic heart failure with preserved ejection fraction [facility])  Assessment & Plan  Wt Readings from Last 3 Encounters:   10/25/23 112 kg (246 lb 0.5 oz)   09/02/23 118 kg (259 lb 7.7 oz)   07/31/23 118 kg (260 lb)     Patient has a history of chronic diastolic congestive heart failure  Most recent 2D echocardiogram 10/22: Left ventricular ejection fraction 61%. Grade 1 diastolic dysfunction. Patient was placed on IV Lasix due to lower extremity edema  Lungs clear to auscultation  transitioned back to home Lasix 40 mg p.o. twice daily  Monitor closely perioperatively  Currently euvolemic post op    Diabetic gastroparesis   Assessment & Plan  Yesterday patient noted he has been retching approximately every 15 minutes around-the-clock since admission  Possibly secondary to gastroparesis, versus intolerance to antibiotics especially Flagyl  scheduled Reglan 24 hours with complete resolution of his symptoms:   Will restart reglan as pt vomiting post op    Orthostatic hypotension  Assessment & Plan  Was previously prescribed midodrine 2.5 mg 3 times daily however only takes this as needed  Pressure currently adequate off of scheduled midodrine: Monitor perioperatively    Type 2 diabetes mellitus with diabetic neuropathy, with long-term current use of insulin [facility])  Assessment & Plan  Lab Results   Component Value Date    HGBA1C 6.7 (A) 02/02/2023       Recent Labs     10/24/23  1712 10/24/23  2100 10/25/23  0815 10/25/23  1345   POCGLU 113 104 134 120     Patient with poor p.o. intake and borderline blood sugars  decreased regimen to avoid hypoglycemia  Home regimen is Lantus 40 units twice daily:   Patient with low-normal blood sugars despite Lantus being held   Cont accu-Checks and sliding scale insulin             Addendum:  post op EKG with inf-lat TWI-  seen on prior EKG in feb. Cont ASA, plavix, statin. Hemodynamically stable.     Family:  updated wife at bedside    Dw pts nurse and cm    VTE Pharmacologic Prophylaxis: Enoxaparin (Lovenox)  VTE Mechanical Prophylaxis: reason for no mechanical VTE prophylaxis bilat amputee        Certification Statement: The patient will continue to require additional inpatient hospital stay due to need for further acute intervention for post op care    Status: inpatient     ===================================================================    Subjective:  Patient is examined and interviewed after returning from the OR. He denies any current pain. Denies any pain at his amputation site. No chest pain. He notes discomfort in his stomach as though he has gas, and needs to pass a bowel movement. Notes he may need to use the toilet in the near future. Patient also notes pain with urinating, and difficulty urinating. He notes suprapubic discomfort. He relates last time he passed his urine it was dark. He is concerned he has a urine infection. Notes he feels nauseous. Notes he has been retching since returning from the operating room. Patient denies any shortness of breath, or cough. Notes he feels very restless, and not comfortable in the bed, he is requesting a large recliner and a Nataliia lift as he usually does not like staying in bed. Physical Exam:   Temp:  [96.5 °F (35.8 °C)-98 °F (36.7 °C)] 97.2 °F (36.2 °C)  HR:  [61-84] 80  Resp:  [12-18] 18  BP: (116-167)/(59-79) 116/67    Gen:  Pleasant, non-tachypnic, non-dyspnic. Conversant. Lying in bed with head of the bed at 80 degrees. Restless. Heart: regular rate and rhythm, S1S2 present, no murmur, rub or gallop  Lungs: clear to ausculatation bilaterally. No wheezing, crackles, or rhonchi. No accessory muscle use or respiratory distress. Good air movement  Abd: soft, non-tender palpation, non-distended. NABS, no guarding, rebound or peritoneal signs. Extremities: Previous left BKA.   Current right AKA dressing in place  Neuro: awake, alert       LABS: Results from last 7 days   Lab Units 10/25/23  0533 10/23/23  0641 10/21/23  0617   WBC Thousand/uL 9.91 7.63 8.50   HEMOGLOBIN g/dL 12.4 11.7* 12.1   HEMATOCRIT % 39.3 36.5 38.7   PLATELETS Thousands/uL 361 317 301     Results from last 7 days   Lab Units 10/25/23  0533 10/23/23  0641 10/21/23  0617   POTASSIUM mmol/L 3.6 3.6 3.7   CHLORIDE mmol/L 98 96 93*   CO2 mmol/L 30 31 31   BUN mg/dL 18 28* 16   CREATININE mg/dL 0.87 0.86 0.91   CALCIUM mg/dL 8.5 8.4 8.3[facility] Data:  10/22: Echocardiogram:    Left Ventricle: Left ventricular cavity size is normal. Wall thickness is moderately increased. The left ventricular ejection fraction is 61%. Systolic function is normal. Wall motion cannot be accurately assessed. Diastolic function is mildly abnormal, consistent with grade I (abnormal) relaxation. Right Ventricle: Systolic function is mildly reduced. 10/20 LE ADS  IGHT LOWER LIMB:  Diffuse atherosclerotic arterial disease noted, with a 50-75% stenosis in the  proximal superficial femoral artery and a 50-75% stenosis in the proximal  superficial femoral artery. Unable to visualize calf vessels due to edema, depth, induration and thickened,  scaly skin. Ankle/Brachial index: supra-normal, consistent with poorly compressible  vessels. Prior . 95 (2020 study). Metatarsal pressure of  7 mm Hg, however, may be unreliable. Great toe pressure of unobtainable, due to flatline ppg waveform. PVR/ PPG tracings are dampened at the ankle, severely attenuated in the  metatarsal/toe. LEFT LOWER LIMB:  BKA  Compared to previous study on 4/18/2023, there are changes and findings as  described above. 10/19: CT right lower extremity  Severe diffuse subcutaneous edema without subcutaneous gas. Limited evaluation for abscess without IV contrast; no apparent discrete collection. No CT signs of osteomyelitis. 10/19 chest x-ray  No acute cardiopulmonary disease.       10/19 right foot x-ray  No acute osseous abnormality. Large ulcer overlying the plantar aspect of the calcaneus which appears slightly larger than on the prior study. Microbiology  10/19: Blood culture: Proteus, Bacteroides x2  10/19: Negative COVID, influenza, RSV          ---------------------------------------------------------------------------------------------------------------  This note has been constructed using a voice recognition system.

## 2023-10-25 NOTE — ANESTHESIA PROCEDURE NOTES
Peripheral Block    Patient location during procedure: holding area  Start time: 10/25/2023 11:47 AM  Reason for block: at surgeon's request and post-op pain management  Staffing  Performed by: Dawit Tello MD  Authorized by: Dawit Tello MD    Preanesthetic Checklist  Completed: patient identified, IV checked, site marked, risks and benefits discussed, surgical consent, monitors and equipment checked, pre-op evaluation and timeout performed  Peripheral Block  Patient position: left lateral  Prep: ChloraPrep  Patient monitoring: continuous pulse oximetry, frequent blood pressure checks and heart rate  Block type:  Infragluteal Sciatic (attempted US guided infragluteal sciatic, unsuccessful, did landmark guided sciatic, posterior approach)  Laterality: right  Injection technique: single-shot  Procedures: nerve stimulator  Needle  Needle type: Stimuplex   Needle gauge: 20 G  Needle length: 4 in  Needle localization: anatomical landmarks  Assessment  Injection assessment: frequent aspiration, injected with ease, negative aspiration, no paresthesia on injection, no symptoms of intraneural/intravenous injection, negative for heart rate change, needle tip visualized at all times and incremental injection  Paresthesia pain: none  Post-procedure:  site cleaned  patient tolerated the procedure well with no immediate complications

## 2023-10-25 NOTE — ASSESSMENT & PLAN NOTE
Patient is a 65-year-old male with past medical history significant for diabetes, hypertension, hyperlipidemia, and peripheral arterial disease who presented to the ER with sepsis     he presented with sepsis, present on admission, with tachycardia, and leukocytosis   Sepsis secondary to bacteremia secondary to right heel wound   Patient was followed by the infectious disease team, and podiatry teams  AKA performed 10/25  S/p  IV antibiotics:  anticipate surgical cure

## 2023-10-25 NOTE — ASSESSMENT & PLAN NOTE
Patient presented with gangrene of the right heel  Had gotten progressively worse since his previous admission 8/2023  Patient was evaluated by podiatry and vascular surgery:  Foot deemed nonsalvageable  AKA performed by vascular surgery 10/25  Cont pain medications

## 2023-10-25 NOTE — PLAN OF CARE
Problem: Potential for Falls  Goal: Patient will remain free of falls  Description: INTERVENTIONS:  - Educate patient/family on patient safety including physical limitations  - Instruct patient to call for assistance with activity   - Consult OT/PT to assist with strengthening/mobility   - Keep Call bell within reach  - Keep bed low and locked with side rails adjusted as appropriate  - Keep care items and personal belongings within reach  - Initiate and maintain comfort rounds  - Make Fall Risk Sign visible to staff  - Offer Toileting every 2 Hours, in advance of need  - Initiate/Maintain bed alarm  - Obtain necessary fall risk management equipment: alarm   - Apply yellow socks and bracelet for high fall risk patients  - Consider moving patient to room near nurses station  Outcome: Progressing     Problem: MOBILITY - ADULT  Goal: Maintain or return to baseline ADL function  Description: INTERVENTIONS:  -  Assess patient's ability to carry out ADLs; assess patient's baseline for ADL function and identify physical deficits which impact ability to perform ADLs (bathing, care of mouth/teeth, toileting, grooming, dressing, etc.)  - Assess/evaluate cause of self-care deficits   - Assess range of motion  - Assess patient's mobility; develop plan if impaired  - Assess patient's need for assistive devices and provide as appropriate  - Encourage maximum independence but intervene and supervise when necessary  - Involve family in performance of ADLs  - Assess for home care needs following discharge   - Consider OT consult to assist with ADL evaluation and planning for discharge  - Provide patient education as appropriate  Outcome: Progressing  Goal: Maintains/Returns to pre admission functional level  Description: INTERVENTIONS:  - Perform BMAT or MOVE assessment daily.   - Set and communicate daily mobility goal to care team and patient/family/caregiver.    - Collaborate with rehabilitation services on mobility goals if consulted  - Perform Range of Motion 3 times a day. - Reposition patient every 2 hours.   - Dangle patient 3 times a day  - Stand patient 3 times a day  - Ambulate patient 3 times a day  - Out of bed to chair 3 times a day   - Out of bed for meals 3 times a day  - Out of bed for toileting  - Record patient progress and toleration of activity level   Outcome: Progressing     Problem: PAIN - ADULT  Goal: Verbalizes/displays adequate comfort level or baseline comfort level  Description: Interventions:  - Encourage patient to monitor pain and request assistance  - Assess pain using appropriate pain scale  - Administer analgesics based on type and severity of pain and evaluate response  - Implement non-pharmacological measures as appropriate and evaluate response  - Consider cultural and social influences on pain and pain management  - Notify physician/advanced practitioner if interventions unsuccessful or patient reports new pain  Outcome: Progressing     Problem: INFECTION - ADULT  Goal: Absence or prevention of progression during hospitalization  Description: INTERVENTIONS:  - Assess and monitor for signs and symptoms of infection  - Monitor lab/diagnostic results  - Monitor all insertion sites, i.e. indwelling lines, tubes, and drains  - Monitor endotracheal if appropriate and nasal secretions for changes in amount and color  - [place] appropriate cooling/warming therapies per order  - Administer medications as ordered  - Instruct and encourage patient and family to use good hand hygiene technique  - Identify and instruct in appropriate isolation precautions for identified infection/condition  Outcome: Progressing     Problem: SAFETY ADULT  Goal: Patient will remain free of falls  Description: INTERVENTIONS:  - Educate patient/family on patient safety including physical limitations  - Instruct patient to call for assistance with activity   - Consult OT/PT to assist with strengthening/mobility   - Keep Call bell within reach  - Keep bed low and locked with side rails adjusted as appropriate  - Keep care items and personal belongings within reach  - Initiate and maintain comfort rounds  - Make Fall Risk Sign visible to staff  - Offer Toileting every 2 Hours, in advance of need  - Initiate/Maintain bed alarm  - Obtain necessary fall risk management equipment: alarm   - Apply yellow socks and bracelet for high fall risk patients  - Consider moving patient to room near nurses station  Outcome: Progressing  Goal: Maintain or return to baseline ADL function  Description: INTERVENTIONS:  -  Assess patient's ability to carry out ADLs; assess patient's baseline for ADL function and identify physical deficits which impact ability to perform ADLs (bathing, care of mouth/teeth, toileting, grooming, dressing, etc.)  - Assess/evaluate cause of self-care deficits   - Assess range of motion  - Assess patient's mobility; develop plan if impaired  - Assess patient's need for assistive devices and provide as appropriate  - Encourage maximum independence but intervene and supervise when necessary  - Involve family in performance of ADLs  - Assess for home care needs following discharge   - Consider OT consult to assist with ADL evaluation and planning for discharge  - Provide patient education as appropriate  Outcome: Progressing  Goal: Maintains/Returns to pre admission functional level  Description: INTERVENTIONS:  - Perform BMAT or MOVE assessment daily.   - Set and communicate daily mobility goal to care team and patient/family/caregiver. - Collaborate with rehabilitation services on mobility goals if consulted  - Perform Range of Motion 3 times a day. - Reposition patient every 2 hours.   - Dangle patient 3 times a day  - Stand patient 3 times a day  - Ambulate patient 3 times a day  - Out of bed to chair 3 times a day   - Out of bed for meals 3 times a day  - Out of bed for toileting  - Record patient progress and toleration of activity level   Outcome: Progressing     Problem: DISCHARGE PLANNING  Goal: Discharge to home or other facility with appropriate resources  Description: INTERVENTIONS:  - Identify barriers to discharge w/patient and caregiver  - Arrange for needed discharge resources and transportation as appropriate  - Identify discharge learning needs (meds, wound care, etc.)  - Arrange for interpretive services to assist at discharge as needed  - Refer to Case Management Department for coordinating discharge planning if the patient needs post-hospital services based on physician/advanced practitioner order or complex needs related to functional status, cognitive ability, or social support system  Outcome: Progressing     Problem: Knowledge Deficit  Goal: Patient/family/caregiver demonstrates understanding of disease process, treatment plan, medications, and discharge instructions  Description: Complete learning assessment and assess knowledge base.   Interventions:  - Provide teaching at level of understanding  - Provide teaching via preferred learning methods  Outcome: Progressing     Problem: Prexisting or High Potential for Compromised Skin Integrity  Goal: Skin integrity is maintained or improved  Description: INTERVENTIONS:  - Identify patients at risk for skin breakdown  - Assess and monitor skin integrity  - Assess and monitor nutrition and hydration status  - Monitor labs   - Assess for incontinence   - Turn and reposition patient  - Assist with mobility/ambulation  - Relieve pressure over bony prominences  - Avoid friction and shearing  - Provide appropriate hygiene as needed including keeping skin clean and dry  - Evaluate need for skin moisturizer/barrier cream  - Collaborate with interdisciplinary team   - Patient/family teaching  - Consider wound care consult   Outcome: Progressing

## 2023-10-25 NOTE — ASSESSMENT & PLAN NOTE
Patient presented with bacteremia with Proteus and Bacteroides   Secondary to right foot wound with gangrene  Continue antibiotics per ID, with ceftriaxone and Flagyl- day #7

## 2023-10-25 NOTE — ANESTHESIA POSTPROCEDURE EVALUATION
Post-Op Assessment Note    CV Status:  Stable    Pain management: adequate     Mental Status:  Alert and awake   Hydration Status:  Euvolemic   PONV Controlled:  Controlled   Airway Patency:  Patent      Post Op Vitals Reviewed: Yes      Staff: Anesthesiologist         No notable events documented.     BP      Temp     Pulse     Resp      SpO2      /59   Pulse 82   Temp (!) 97.2 °F (36.2 °C)   Resp 17   Ht 6' 1" (1.854 m)   Wt 112 kg (246 lb 0.5 oz)   SpO2 96%   BMI 32.46 kg/m²

## 2023-10-25 NOTE — ASSESSMENT & PLAN NOTE
History of CAD status post CABG  Continue on aspirin, Plavix, statin  Elevated troponin on admission attributed to non-MI troponin elevation secondary to sepsis  He was evaluated by the cardiology team, no evidence of ischemia  Continue home regimen  Telemetry perioperatively  Will check post-op EKG

## 2023-10-25 NOTE — ASSESSMENT & PLAN NOTE
Patient follows with vascular surgery outpatient  Continue aspirin, Plavix, statin  He has diffuse disease proximally so vascular surgery performed AKA 10/25  Doppler: "Diffuse atherosclerotic arterial disease noted, with a 50-75% stenosis in the  proximal superficial femoral artery and a 50-75% stenosis in the proximal superficial femoral artery"

## 2023-10-25 NOTE — ANESTHESIA PROCEDURE NOTES
Peripheral Block    Patient location during procedure: holding area  Start time: 10/25/2023 12:05 PM  Reason for block: at surgeon's request and post-op pain management  Staffing  Performed by: David Lemon MD  Authorized by: David Lemon MD    Preanesthetic Checklist  Completed: patient identified, IV checked, site marked, risks and benefits discussed, surgical consent, monitors and equipment checked, pre-op evaluation and timeout performed  Peripheral Block  Patient position: supine  Prep: ChloraPrep  Patient monitoring: continuous pulse oximetry, frequent blood pressure checks and heart rate  Block type: Femoral  Laterality: right  Injection technique: single-shot  Procedures: ultrasound guided, Ultrasound guidance required for the procedure to increase accuracy and safety of medication placement and decrease risk of complications.  and nerve stimulator  Ultrasound permanent image saved  Needle  Needle type: Stimuplex   Needle gauge: 20 G  Needle length: 4 in  Needle localization: nerve stimulator and ultrasound guidance  Assessment  Injection assessment: frequent aspiration, injected with ease, negative aspiration, no paresthesia on injection, no symptoms of intraneural/intravenous injection, negative for heart rate change, needle tip visualized at all times and incremental injection  Paresthesia pain: none  Post-procedure:  site cleaned  patient tolerated the procedure well with no immediate complications

## 2023-10-25 NOTE — CASE MANAGEMENT
Case Management Discharge Planning Note    Patient name Randy Cummings  Location [facility] [address] [facility] [place] 456/E4 MS 26-* MRN [id number]  : 1960 Date 10/25/2023       Current Admission Date: 10/19/2023  Current Admission Diagnosis:Sepsis [facility])   Patient Active Problem List    Diagnosis Date Noted    Gangrene ([address]) 10/23/2023    Sepsis ([address]) 10/20/2023    Bacteremia due to Proteus species 10/20/2023    Elevated troponin 10/19/2023    Loose stools 2023    Diabetic ulcer of right heel ([address]) 2023    Lymphedema in adult patient 2023    Cellulitis of right ankle 2023    Non-pressure chronic ulcer of right calf with fat layer exposed ([address]) 2023    Embolism and thrombosis of arteries of the lower extremities ([address]) 10/29/2020    Lymphedema of right lower extremity 10/29/2020    Venous stasis dermatitis of right lower extremity 10/29/2020    Abdominal distension 10/29/2020    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    Nodular rash 2020    CAD (coronary artery disease) 2019    Phantom limb syndrome without pain ([address]) 10/04/2018    Obstructive sleep apnea 2018    Hyponatremia 2018    Diabetic autonomic neuropathy associated with type 2 diabetes mellitus ([address]) 2018    S/P CABG x 3 2018    Chronic heart failure with preserved ejection fraction ([address]) 2018    Hypertensive heart disease with congestive heart failure ([address])     Triple vessel coronary artery disease 2018    Diabetic gastroparesis  2018    Class 2 severe obesity due to excess calories with serious comorbidity and body mass index (BMI) of 35.0 to 35.9 in adult  2018    Orthostatic hypotension 2018    PAD (peripheral artery disease) ([address]) 2018    S/P BKA (below knee amputation), left ([address]) 2018    Anxiety and depression 2017    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 2017    Vitamin D deficiency 2016 Hyperlipidemia 02/11/2015    Type 2 diabetes mellitus with diabetic neuropathy, with long-term current use of insulin ([address]) 11/06/2014      LOS (days): 6  Geometric Mean LOS (GMLOS) (days): 5.10  Days to GMLOS:-0.9     OBJECTIVE:  Risk of Unplanned Readmission Score: 19.37         Current admission status: Inpatient   Preferred Pharmacy:   [facility] [address] RAZIA PEREIRA [address] [address]  Phone: [phone] Fax: [phone]    [facility] ([address] [address] [address]  [facility] [address] [address] [facility] [address]  [address] [facility] [address]  Phone: [phone] Fax: [facility], [address] [facility]  [address] [facility]  Phone: [phone] Fax: [phone]    Primary Care Provider: Stefano Monroe DO    Primary Insurance: Sandra Layne [facility]  Secondary Insurance:     DISCHARGE DETAILS:    Discharge planning discussed with[de-identified] Patient and wife  Freedom of Choice: Yes                   Contacts  Patient Contacts: Keisha Jiménez (Spouse)  [phone] Donnamarie Living)  Relationship to Patient[de-identified] Family  Contact Method: Phone  Phone Number: Keisha Jiménez (Spouse)  [phone] Donnamarie Living)  Reason/Outcome: Continuity of Care, Emergency Contact            Additional Comments: CM spoke with patient's wife. She is hesitant for him to return home post rehabilitation because she thinks she will not be able to care for him. She states she does not have funds for private caregivers while she is at work. CM emailed patient information to [employer] for medicaid and financial assistance. CM started optioning process paperwork. Patient's wife states she has the medicaid application but has not filled it out yet. CM encouraged patient to fill out that application as long term care will need it. CM to send optioning paperwork once physical therapy and occupational therapy has seen patient.

## 2023-10-25 NOTE — DISCHARGE INSTR - AVS FIRST PAGE
DISCHARGE INSTRUCTIONS  RIGHT LEG AMPUTATION    REHABILITATION:   You will require some form of rehabilitation after this procedure. This will assist with your return to daily activities by incorporating exercise and balance training. This may be in the form of visiting nurses and physical therapy in your home or more commonly, admission to a rehabilitation facility for a period of time before you return home. ACTIVITY:  You cannot put any weight on the bottom of your residual limb. Physical therapy and rehab staff will direct progression of your activity based on your progress. Please follow their recommendations closely. It is incredibly important to avoid falling on your residual limb as this can cause bleeding and the wound to open up. Your surgeon will decide when you are ready to be referred to the prosthetist for a fitting. This will occur sometime after your 4-week appointment and often later. DIET:  Resume your normal diet. Good nutrition is important for healing of your incision. INCISION:  Wash incision daily with soap and water and thoroughly pat dry. Apply clean, dry gauze and an ACE wrap daily to decrease swelling and get your stump ready to be fit for a prosthetic. It is normal to have swelling or discoloration around the incision. If increasing redness or pain develops, call our office immediately. You will have stitches or staples and these will be evaluated for removal at your first post-operative visit around 4 weeks after surgery. If any of your incisions are open and require dressing changes, you will be given instructions for your daily incision care. If you are not able to change the dressings, a visiting nurse will be arranged. Do not bathe in a tub or swim until your incision is completely healed. DO NOT put any powders, creams, ointments, or lotions on your incision.     FOLLOW UP APPOINTMENTS:  Making and keeping follow up appointments and ultrasound tests are important to your recovery. If you have difficulty making it to or keeping your follow up appointments, call the office. If you have increased pain, fever >101.5, increased drainage, redness or a bad smell at your surgery site, new coldness/numbness of your arm or leg, please call us immediately and GO directly to the ER. PLEASE CALL THE OFFICE IF YOU HAVE ANY QUESTIONS  [phone]  [phone]  [address] [facility], [place]), [address] [facility]  [address] [facility], [address] [address]  [address], [facility], [address] [facility]  [address] [facility], [address] [facility] [address], [facility], [address]  1001 [facility],Sixth Floor, 1st Floor, [place], [address]  [address], [address], [address], [place], [address] [facility] [address] To [facility] [place]  [address], [address] [place] [address] [place] ([place]), [address] [facility]  [address] [facility], [address] [facility], [address] [facility], [address] [address] [address] [address] [address] [place] [facility]  [address], [address]

## 2023-10-25 NOTE — PROGRESS NOTES
[facility]  Progress Note  Name: Trevor Herman  MRN: [id number]  Unit/Bed#: E4 -01 I Date of Admission: 10/19/2023   Date of Service: 10/25/2023 I Hospital Day: 6    Assessment/Plan   PAD (peripheral artery disease) [facility])  Assessment & Plan  57 yo male former smoker (quit 1994) with hx of DM II, CAD S/P CABG w/ R GSV, L BKA (by Dr. Murguia Scale 8/3/18 for tissue loss), PAD and chronic R heel ulcer presented to [facility] on 10/19/23 w/ weakness, chills, nausea and vomiting. Pt admitted w/ worsening R foot wound and proteus bacteremia, likely secondary to foot wound. R foot xray shows large ulcer overlying plantar aspect of calcaneous. CT RLE shows severe diffuse subcutaneous edema without subcutaneous gas; (-) for OM. Pt seen in consult by podiatry for R heel wound. Per podiatry, limb salvage is unlikely and pt will need amputation of RLE. Vascular surgery consulted for higher level amputation. Recommending above-knee amputation in setting of infection, severe lymphedema, skin changes, and existing contralateral amputation. Pt seen by cardiology for cardiac risk stratification. Per cardiology, "pt is okay to proceed with planned above-knee amputation surgery. He does have elevated risk for perioperative MI and will need close monitoring."     Diagnostics:   -10/22/23: Echo: LVEF 28% w/ normal systolic function and mildly abnormal diastolic dysfunction  -ISAK 10/20/23: R YUDITH: 0.95/7/-; Diffuse atherosclerotic disease, 50-75% prox SFA x 2; unable to visualize calf vessels due to edema, depth  -CT RLE 10/19/23: severe diffuse subcutaneous edema without subcutaneous gas; no OM. -Xray R foot 10/19/23: large ulcer overlying plantar aspect of calcaneous. Plan:  -Nonsalvageable RLE secondary to infected heal wound, per podiatry  -Sepsis bacteremia: BC (+) proteus; continue rocephin and flagyl per SLIM and ID  -Per cardiology, okay to proceed w/ surgery.  Pt has an elevated risk for latrell-op MI and will need close monitoring  -Plan for R AKA today (10/25)  -NPO for OR today  -Continue wound care per podiatry  -Continue ASA, plavix and lipitor  -Maintain good diabetes control  -Discussed w/ Dr. Tiesha Fritz      Subjective:  Pt seen for exam while sitting in his chair; NAD. Pt denies any complaints at this time. All questions answered re: AKA. Pt and wife verbalize understanding. Pt remains NPO for R AKA this afternoon. Vitals:  /79 (BP Location: Right arm)   Pulse 68   Temp (!) 97 °F (36.1 °C) (Temporal)   Resp 18   Ht 6' 1" (1.854 m)   Wt 112 kg (246 lb 0.5 oz)   SpO2 98%   BMI 32.46 kg/m²     I/Os:  I/O last 3 completed shifts: In: 1400 [P.O.:1400]  Out: 825 [Urine:825]  No intake/output data recorded.     Lab Results and Cultures:   Lab Results   Component Value Date    WBC 9.91 10/25/2023    HGB 12.4 10/25/2023    HCT 39.3 10/25/2023    MCV 84 10/25/2023     10/25/2023     Lab Results   Component Value Date    GLUCOSE 165 (H) 03/28/2018    CALCIUM 8.5 10/25/2023     09/13/2016    K 3.6 10/25/2023    CO2 30 10/25/2023    CL 98 10/25/2023    BUN 18 10/25/2023    CREATININE 0.87 10/25/2023     Lab Results   Component Value Date    INR 1.08 10/19/2023    INR 1.08 08/29/2023    INR 0.99 08/29/2021    PROTIME 14.0 10/19/2023    PROTIME 14.0 08/29/2023    PROTIME 12.9 08/29/2021     Blood Culture:   Lab Results   Component Value Date    BLOODCX Proteus mirabilis (A) 10/19/2023    BLOODCX Bacteroides thetaiotaomicron group (A) 10/19/2023     Urinalysis:   Lab Results   Component Value Date    COLORU Yellow 10/19/2023    CLARITYU Clear 10/19/2023    SPECGRAV 1.018 10/19/2023    PHUR 7.5 10/19/2023    PHUR 7.5 05/23/2018    LEUKOCYTESUR Negative 10/19/2023    NITRITE Negative 10/19/2023    GLUCOSEU Negative 10/19/2023    KETONESU Negative 10/19/2023    BILIRUBINUR Negative 10/19/2023    BLOODU Negative 10/19/2023         Medications:  Current Facility-Administered Medications   Medication Dose Route Frequency    acetaminophen (TYLENOL) tablet 650 mg  650 mg Oral Q6H PRN    ALPRAZolam (XANAX) tablet 0.25 mg  0.25 mg Oral BID PRN    aspirin (ECOTRIN LOW STRENGTH) EC tablet 81 mg  81 mg Oral Daily    atorvastatin (LIPITOR) tablet 80 mg  80 mg Oral Daily With Dinner    cefTRIAXone (ROCEPHIN) IVPB (premix in dextrose) 2,000 mg 50 mL  2,000 mg Intravenous Q24H    clopidogrel (PLAVIX) tablet 75 mg  75 mg Oral Daily    docusate sodium (COLACE) capsule 100 mg  100 mg Oral BID    enoxaparin (LOVENOX) subcutaneous injection 40 mg  40 mg Subcutaneous Daily    finasteride (PROSCAR) tablet 5 mg  5 mg Oral Daily    furosemide (LASIX) tablet 40 mg  40 mg Oral BID (diuretic)    insulin lispro (HumaLOG) 100 units/mL subcutaneous injection 1-5 Units  1-5 Units Subcutaneous TID AC    magnesium hydroxide (MILK OF MAGNESIA) oral suspension 30 mL  30 mL Oral Daily PRN    melatonin tablet 6 mg  6 mg Oral HS    metroNIDAZOLE (FLAGYL) IVPB (premix) 500 mg 100 mL  500 mg Intravenous Q8H    ondansetron (ZOFRAN) injection 4 mg  4 mg Intravenous Q4H PRN    pantoprazole (PROTONIX) EC tablet 40 mg  40 mg Oral Early Morning    senna (SENOKOT) tablet 8.6 mg  1 tablet Oral HS    tamsulosin (FLOMAX) capsule 0.4 mg  0.4 mg Oral Daily With Dinner       Imaging:  See above    Physical Exam:    General appearance: alert and oriented, in no acute distress  Skin: skin color, texture, turgor normal w/ the exception of thick, brown scaling on RLE   Neurologic: Grossly normal  Head: Normocephalic, without obvious abnormality, atraumatic  Lungs: clear to auscultation bilaterally  Heart: regular rate and rhythm  Abdomen: soft, non-tender; bowel sounds normal; no masses,  no organomegaly  Extremities: warm, no cyanosis or clubbing, 2+ RLE edema; L BKA     Wound/Incision:  R heel wound w/ drsg dry and intact    Pulse exam:  Radial: Right: 2+ Left[de-identified] 2+      CARLTON Orozco  10/25/2023

## 2023-10-25 NOTE — ASSESSMENT & PLAN NOTE
Lab Results   Component Value Date    HGBA1C 6.7 (A) 02/02/2023       Recent Labs     10/24/23  1712 10/24/23  2100 10/25/23  0815 10/25/23  1345   POCGLU 113 104 134 120     Patient with poor p.o. intake and borderline blood sugars  decreased regimen to avoid hypoglycemia  Home regimen is Lantus 40 units twice daily:   Patient with low-normal blood sugars despite Lantus being held   Cont accu-Checks and sliding scale insulin

## 2023-10-25 NOTE — OP NOTE
OPERATIVE REPORT  PATIENT NAME: Nina Benjamin    :  1960  MRN: [id number]  Pt Location: [facility] 09    SURGERY DATE: 10/25/2023    Surgeon(s) and Role:     * Rm Salguero MD - Primary     * Srinivas Durant DO - Assisting    Preop Diagnosis:  Diabetic ulcer of right heel associated with type 2 diabetes mellitus, with fat layer exposed ([address]) [V27.595, L97.412]    Post-Op Diagnosis Codes:     * Diabetic ulcer of right heel associated with type 2 diabetes mellitus, with fat layer exposed ([address]) [B64.187, L97.412]    Procedure(s):  Right - (AKA)    Specimen(s):  ID Type Source Tests Collected by Time Destination   1 : RIGHT ABOVE KNEE AMPUTATION Tissue Leg, Right TISSUE EXAM Rm Salguero MD 10/25/2023 1152        Estimated Blood Loss:   Minimal    Drains:  * No LDAs found *    Anesthesia Type:   General    Operative Indications:  Diabetic ulcer of right heel associated with type 2 diabetes mellitus, with fat layer exposed ([address]) [F47.746, L97.412]      Mr. Frederick Rose is a 55yo male with nonsalvageable right foot wound in setting of PAD and lymphedema. Plan for right AKA. Informed consent was obtained. Operative Findings:  Right AKA performed at mid femur    Complications:   None    Procedure and Technique:  The patient was brought to the operating room. A nerve block was performed by anesthesia prior to entering the operating room. The patient was placed in the supine position. After anesthesia monitoring lines were placed, induction of anesthesia was performed. Time out with site verification of the right leg was performed. The patient was prepped and draped in a sterile manner. The patient had a preoperative antibiotic administered within one hour of the skin incision. A fishmouth skin incision was made and carried through the soft tissues and muscles down to the right femur. Satisfactory bleeding and tissue color were noted.   The femur was transected with a gigli saw ensuring the bone was cut to a smooth surface. Posterior muscle groups were divided and the specimen was delivered from the operative field. Additional hemostasis was achieved with cautery or ligation of individual vessels. The wound was irrigated with saline. Fascia was re-approximated with interrupted 2-0 Vicryl. The skin was re-approximated with 3-0 nylon vertical mattress sutures and skin staples. A prevena wound VAC dressing was applied and placed to suction at 125mmHg with good seal. All sponge, needle and instrument counts were correct at the end of the case. The patient tolerated the procedure well. I was present for the entire procedure.     Patient Disposition:  PACU  and hemodynamically stable        SIGNATURE: Joesph Montano MD  DATE: October 25, 2023  TIME: 1:26 PM

## 2023-10-25 NOTE — ASSESSMENT & PLAN NOTE
Yesterday patient noted he has been retching approximately every 15 minutes around-the-clock since admission  Possibly secondary to gastroparesis, versus intolerance to antibiotics especially Flagyl  scheduled Reglan 24 hours with complete resolution of his symptoms:   Will restart reglan as pt vomiting post op

## 2023-10-25 NOTE — ASSESSMENT & PLAN NOTE
59 yo male former smoker (quit 1994) with hx of DM II, CAD S/P CABG w/ R GSV, L BKA (by Dr. Craig Query 8/3/18 for tissue loss), PAD and chronic R heel ulcer presented to [facility] on 10/19/23 w/ weakness, chills, nausea and vomiting. Pt admitted w/ worsening R foot wound and proteus bacteremia, likely secondary to foot wound. R foot xray shows large ulcer overlying plantar aspect of calcaneous. CT RLE shows severe diffuse subcutaneous edema without subcutaneous gas; (-) for OM. Pt seen in consult by podiatry for R heel wound. Per podiatry, limb salvage is unlikely and pt will need amputation of RLE. Vascular surgery consulted for higher level amputation. Recommending above-knee amputation in setting of infection, severe lymphedema, skin changes, and existing contralateral amputation. Pt seen by cardiology for cardiac risk stratification. Per cardiology, "pt is okay to proceed with planned above-knee amputation surgery. He does have elevated risk for perioperative MI and will need close monitoring."     Diagnostics:   -10/22/23: Echo: LVEF 63% w/ normal systolic function and mildly abnormal diastolic dysfunction  -ISAK 10/20/23: R YUDITH: 0.95/7/-; Diffuse atherosclerotic disease, 50-75% prox SFA x 2; unable to visualize calf vessels due to edema, depth  -CT RLE 10/19/23: severe diffuse subcutaneous edema without subcutaneous gas; no OM. -Xray R foot 10/19/23: large ulcer overlying plantar aspect of calcaneous. Plan:  -Nonsalvageable RLE secondary to infected heal wound, per podiatry  -Sepsis bacteremia: BC (+) proteus; continue rocephin and flagyl per SLIM and ID  -Per cardiology, okay to proceed w/ surgery.  Pt has an elevated risk for latrell-op MI and will need close monitoring  -Plan for R AKA today (10/25)  -NPO for OR today  -Continue wound care per podiatry  -Continue ASA, plavix and lipitor  -Maintain good diabetes control  -Discussed w/ Dr. Marialuisa Johnson

## 2023-10-26 ENCOUNTER — DOCUMENTATION (OUTPATIENT)
Dept: VASCULAR SURGERY | Facility: CLINIC | Age: 63
End: 2023-10-26

## 2023-10-26 PROBLEM — D64.9 ANEMIA: Status: ACTIVE | Noted: 2023-10-26

## 2023-10-26 LAB
ANION GAP SERPL CALCULATED.3IONS-SCNC: 8 MMOL/L
BACTERIA UR QL AUTO: ABNORMAL /HPF
BILIRUB UR QL STRIP: NEGATIVE
BUN SERPL-MCNC: 16 MG/DL (ref 5–25)
CALCIUM SERPL-MCNC: 7.7 MG/DL (ref 8.4–10.2)
CHLORIDE SERPL-SCNC: 99 MMOL/L (ref 96–108)
CLARITY UR: CLEAR
CO2 SERPL-SCNC: 29 MMOL/L (ref 21–32)
COLOR UR: ABNORMAL
CREAT SERPL-MCNC: 1.27 MG/DL (ref 0.6–1.3)
ERYTHROCYTE [DISTWIDTH] IN BLOOD BY AUTOMATED COUNT: 14.8 % (ref 11.6–15.1)
GFR SERPL CREATININE-BSD FRML MDRD: 59 ML/MIN/1.73SQ M
GLUCOSE SERPL-MCNC: 115 MG/DL (ref 65–140)
GLUCOSE SERPL-MCNC: 126 MG/DL (ref 65–140)
GLUCOSE SERPL-MCNC: 137 MG/DL (ref 65–140)
GLUCOSE SERPL-MCNC: 166 MG/DL (ref 65–140)
GLUCOSE SERPL-MCNC: 175 MG/DL (ref 65–140)
GLUCOSE UR STRIP-MCNC: NEGATIVE MG/DL
HCT VFR BLD AUTO: 31 % (ref 36.5–49.3)
HGB BLD-MCNC: 9.9 G/DL (ref 12–17)
HGB UR QL STRIP.AUTO: ABNORMAL
HYALINE CASTS #/AREA URNS LPF: ABNORMAL /LPF
KETONES UR STRIP-MCNC: ABNORMAL MG/DL
LEUKOCYTE ESTERASE UR QL STRIP: NEGATIVE
MCH RBC QN AUTO: 27 PG (ref 26.8–34.3)
MCHC RBC AUTO-ENTMCNC: 31.9 G/DL (ref 31.4–37.4)
MCV RBC AUTO: 85 FL (ref 82–98)
NITRITE UR QL STRIP: NEGATIVE
NON-SQ EPI CELLS URNS QL MICRO: ABNORMAL /HPF
PH UR STRIP.AUTO: 5.5 [PH]
PLATELET # BLD AUTO: 314 THOUSANDS/UL (ref 149–390)
PMV BLD AUTO: 10.4 FL (ref 8.9–12.7)
POTASSIUM SERPL-SCNC: 3.9 MMOL/L (ref 3.5–5.3)
PROT UR STRIP-MCNC: NEGATIVE MG/DL
RBC # BLD AUTO: 3.67 MILLION/UL (ref 3.88–5.62)
RBC #/AREA URNS AUTO: ABNORMAL /HPF
SODIUM SERPL-SCNC: 136 MMOL/L (ref 135–147)
SP GR UR STRIP.AUTO: 1.01 (ref 1–1.03)
UROBILINOGEN UR STRIP-ACNC: <2 MG/DL
WBC # BLD AUTO: 11.89 THOUSAND/UL (ref 4.31–10.16)
WBC #/AREA URNS AUTO: ABNORMAL /HPF

## 2023-10-26 PROCEDURE — 80048 BASIC METABOLIC PNL TOTAL CA: CPT | Performed by: SURGERY

## 2023-10-26 PROCEDURE — 99024 POSTOP FOLLOW-UP VISIT: CPT | Performed by: NURSE PRACTITIONER

## 2023-10-26 PROCEDURE — 85027 COMPLETE CBC AUTOMATED: CPT | Performed by: SURGERY

## 2023-10-26 PROCEDURE — 99233 SBSQ HOSP IP/OBS HIGH 50: CPT | Performed by: INTERNAL MEDICINE

## 2023-10-26 PROCEDURE — 81001 URINALYSIS AUTO W/SCOPE: CPT | Performed by: INTERNAL MEDICINE

## 2023-10-26 PROCEDURE — 97167 OT EVAL HIGH COMPLEX 60 MIN: CPT

## 2023-10-26 PROCEDURE — 82948 REAGENT STRIP/BLOOD GLUCOSE: CPT

## 2023-10-26 PROCEDURE — 97163 PT EVAL HIGH COMPLEX 45 MIN: CPT

## 2023-10-26 PROCEDURE — 99232 SBSQ HOSP IP/OBS MODERATE 35: CPT | Performed by: INTERNAL MEDICINE

## 2023-10-26 PROCEDURE — 97110 THERAPEUTIC EXERCISES: CPT

## 2023-10-26 RX ORDER — METOCLOPRAMIDE HYDROCHLORIDE 5 MG/ML
5 INJECTION INTRAMUSCULAR; INTRAVENOUS EVERY 6 HOURS PRN
Status: DISCONTINUED | OUTPATIENT
Start: 2023-10-26 | End: 2023-11-01 | Stop reason: HOSPADM

## 2023-10-26 RX ORDER — ONDANSETRON 2 MG/ML
4 INJECTION INTRAMUSCULAR; INTRAVENOUS EVERY 4 HOURS PRN
Status: DISCONTINUED | OUTPATIENT
Start: 2023-10-26 | End: 2023-11-01 | Stop reason: HOSPADM

## 2023-10-26 RX ADMIN — CLOPIDOGREL BISULFATE 75 MG: 75 TABLET ORAL at 08:26

## 2023-10-26 RX ADMIN — METRONIDAZOLE 500 MG: 500 INJECTION, SOLUTION INTRAVENOUS at 15:34

## 2023-10-26 RX ADMIN — METOCLOPRAMIDE 5 MG: 5 INJECTION, SOLUTION INTRAMUSCULAR; INTRAVENOUS at 11:41

## 2023-10-26 RX ADMIN — INSULIN LISPRO 1 UNITS: 100 INJECTION, SOLUTION INTRAVENOUS; SUBCUTANEOUS at 16:53

## 2023-10-26 RX ADMIN — Medication 6 MG: at 22:39

## 2023-10-26 RX ADMIN — PANTOPRAZOLE SODIUM 40 MG: 40 TABLET, DELAYED RELEASE ORAL at 05:39

## 2023-10-26 RX ADMIN — ATORVASTATIN CALCIUM 80 MG: 80 TABLET, FILM COATED ORAL at 15:46

## 2023-10-26 RX ADMIN — ASPIRIN 81 MG: 81 TABLET, COATED ORAL at 08:26

## 2023-10-26 RX ADMIN — METRONIDAZOLE 500 MG: 500 INJECTION, SOLUTION INTRAVENOUS at 23:32

## 2023-10-26 RX ADMIN — METOCLOPRAMIDE 5 MG: 5 INJECTION, SOLUTION INTRAMUSCULAR; INTRAVENOUS at 17:44

## 2023-10-26 RX ADMIN — METRONIDAZOLE 500 MG: 500 INJECTION, SOLUTION INTRAVENOUS at 08:28

## 2023-10-26 RX ADMIN — FUROSEMIDE 40 MG: 40 TABLET ORAL at 15:46

## 2023-10-26 RX ADMIN — CEFTRIAXONE 2000 MG: 2 INJECTION, SOLUTION INTRAVENOUS at 00:26

## 2023-10-26 RX ADMIN — OXYCODONE HYDROCHLORIDE 5 MG: 5 TABLET ORAL at 03:41

## 2023-10-26 RX ADMIN — METOCLOPRAMIDE 5 MG: 5 INJECTION, SOLUTION INTRAMUSCULAR; INTRAVENOUS at 05:39

## 2023-10-26 RX ADMIN — ENOXAPARIN SODIUM 40 MG: 40 INJECTION SUBCUTANEOUS at 08:27

## 2023-10-26 RX ADMIN — FINASTERIDE 5 MG: 5 TABLET, FILM COATED ORAL at 08:26

## 2023-10-26 RX ADMIN — ALPRAZOLAM 0.25 MG: 0.25 TABLET ORAL at 22:39

## 2023-10-26 RX ADMIN — SENNOSIDES 8.6 MG: 8.6 TABLET, FILM COATED ORAL at 22:39

## 2023-10-26 RX ADMIN — TAMSULOSIN HYDROCHLORIDE 0.4 MG: 0.4 CAPSULE ORAL at 15:46

## 2023-10-26 NOTE — OCCUPATIONAL THERAPY NOTE
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[address])    Anemia    Past Medical History  Past Medical History:   Diagnosis Date    Amputated below knee, left ([address]) 10/4/2018    Anxiety     Anxiety and depression     Cataract     Congestive cardiac failure ([address])     Coronary artery disease     Depression     Diabetes mellitus ([address])     Diabetic autonomic neuropathy associated with type 2 diabetes mellitus ([address])     Last Assessed: 12/28/2017    History of DVT (deep vein thrombosis)     left LE    Hyperlipidemia     Lymphedema of right lower extremity     Obesity     Orthostatic hypotension      Past Surgical History  Past Surgical History:   Procedure Laterality Date    AMPUTATION ABOVE KNEE (AKA) Right 10/25/2023    Procedure: (AKA);   Surgeon: Zoltan Georges MD;  Location: [facility];  Service: Vascular    CORONARY ARTERY BYPASS GRAFT      EYE SURGERY      cataracts bilateral    LEG AMPUTATION THROUGH LOWER TIBIA AND FIBULA Left 8/3/2018    Procedure: AMPUTATION BELOW KNEE (BKA) L BKA;  Surgeon: Miguelina Malik MD;  Location: [facility];  Service: Vascular    AK CORONARY ARTERY BYP W/VEIN & ARTERY GRAFT 4 VEIN N/A 3/28/2018    Procedure: CORONARY ARTERY BYPASS GRAFT (CABG) x3 VESSELS, LIMA TO LAD, SVG--> PDA, SVG--> OM2,  RIGHT LEG EVH/SVH TO , INTRA-OP AMANDEEP;  Surgeon: Tamiko Vera MD;  Location: [facility];  Service: Cardiac Surgery    ROTATOR CUFF REPAIR Bilateral            10/26/23 1109   OT Last Visit OT Visit Date 10/26/23   Note Type   Note type Evaluation   Pain Assessment   Pain Assessment Tool 0-10   Pain Score 4   Pain Location/Orientation Orientation: Right;Orientation: Lower   Patient's Stated Pain Goal No pain   Hospital Pain Intervention(s) Repositioned; Ambulation/increased activity; Elevated; Rest   Multiple Pain Sites No   Restrictions/Precautions   Weight Bearing Precautions Per Order Yes   RLE Weight Bearing Per Order NWB  (s/p R AKA 10/25)   Braces or Orthoses Other (Comment)  (L LE prosthesis)   Other Precautions Fall Risk;Pain;Multiple lines;WBS  (Wound vac. S/p R AKA 10/25/23. Hx L BKA)   Home Living   Type of [address] [place] [address] Two level;1/2 bath on main level;Performs ADLs on one level; Able to live on main level with bedroom/bathroom  (3+1 VITOR. 1st floor setup)   Bathroom Shower/Tub   (Sponge bathes)   [address]; Wheelchair-manual;Reacher; Other (Comment)  (Recliner lift chair- sleeps in lift chair at baseline)   Additional Comments Pt presents from [facility] for rehab. Prior to rehab, Pt lives with spouse in a two level house with 3+1 VITOR and 1st floor setup. Pt's spouse works, (+) home alone. Prior Function   Level of [place] Needs assistance with ADLs; Needs assistance with functional mobility; Needs assistance with IADLS;Modified independent with wheelchair  (I w/ ADLs prior to admission in 08/2023, now requiring assist w/ ADLs at rehab)   Lives With Spouse   Receives Help From Family   IADLs Family/Friend/Other provides transportation; Family/Friend/Other provides meals; Family/Friend/Other provides medication management   Falls in the last 6 months 0   Vocational On disability   Comments At rehab, pt required assist w/ ADLs, IADLs, and functional transfers/mobility. Pt reports primarily performing SPTs at rehab with assistance. Mod I for W/C mobility. (-) . Denies falls PTA.    Lifestyle   Autonomy At rehab, pt required assist w/ ADLs, IADLs, and functional transfers/mobility. Pt reports primarily performing SPTs at rehab with assistance. Mod I for W/C mobility. (-) . Denies falls PTA. Reciprocal Relationships Spouse   Service to Others On disability   Intrinsic Gratification Watching TV   ADL   Where Assessed Chair   Eating Assistance 7  Independent   Grooming Assistance 5  Supervision/Setup   UB Bathing Assistance 5  Supervision/Setup   LB Bathing Assistance 3  Moderate Assistance   UB Dressing Assistance 5  Supervision/Setup   LB Dressing Assistance 3  Moderate [address]  3  Moderate Assistance   Functional Assistance 3  Moderate Assistance   Bed Mobility   Supine to Sit 3  Moderate assistance   Additional items Assist x 1;HOB elevated; Bedrails; Increased time required;Verbal cues;LE management   Sit to Supine Unable to assess   Additional Comments Pt seated OOB in chair at end of session. Call bell and phone within reach. All needs met and pt reports no further questions for OT at this time. Transfers   Sit to Stand Unable to assess   Stand to Sit Unable to assess   Other 3  Moderate assistance   Additional items Assist x 2; Increased time required;Verbal cues  (backward scooting EOB>Bedside recliner)   Additional Comments Unable to assess sit<>stand transfers 2* loose fitting L LE prosthesis. Balance   Static Sitting Fair   Dynamic Sitting Poor +   Activity Tolerance   Activity Tolerance Patient limited by pain; Patient limited by fatigue   Medical Staff Made Aware Stuart, PT   Nurse Made Aware yes; YI Francis   RUE Assessment   RUE Assessment WFL  (4/5 throughout)   LUE Assessment   LUE Assessment WFL  (4/5 throughout)   Hand Function   Gross Motor Coordination Functional   Fine Motor Coordination Functional   Sensation   Light Touch Partial deficits in the RUE;Partial deficits in the LUE;Partial deficits in the RLE;Partial deficits in the LLE   Proprioception   Proprioception No apparent deficits   Vision - Complex Assessment   Ocular Range of Motion Intact   Acuity Able to read clock/calendar on wall without difficulty; Able to read employee name badge without difficulty   Psychosocial   Psychosocial (WDL) WDL   Perception   Inattention/Neglect Appears intact   Cognition   Overall Cognitive Status WFL   Arousal/Participation Alert; Cooperative   Attention Attends with cues to redirect   Orientation Level Oriented X4   Memory Within functional limits   Following Commands Follows one step commands without difficulty   Comments Pt anxious at times through eval, requiring increased encouragement and verbal cues for redirection   Assessment   Limitation Decreased ADL status; Decreased UE strength;Decreased endurance;Decreased sensation;Decreased self-care trans;Decreased high-level ADLs   Prognosis Good   Assessment Pt is a 58 y.o. male seen for OT evaluation s/p adm to [facility] on 10/19/2023 w/ weakness and chills. Pt admitted w/ Sepsis, gangrene of R heel. Pt now s/p R AKA on 10/25/23. Of note,  Patient was most recently admitted 8/29 - 9/2 for right heel diabetic ulcer patient was treated with IV antibiotics and discharged on oral antibiotics to short-term rehab at [facility]. Comorbidities affecting pt’s functional performance include a significant PMH of DM, PAD, Chronic R heel ulcer, and hx of L BKA. Pt with active OT orders and activity orders for Up and OOB as tolerated. Pt presents from [facility] for rehab. Prior to rehab, Pt lives with spouse in a two level house with 3+1 VITOR and 1st floor setup. Pt's spouse works, (+) home alone. At rehab, pt required assist w/ ADLs, IADLs, and functional transfers/mobility. Pt reports primarily performing SPTs at rehab with assistance. Mod I for W/C mobility. (-) . Denies falls PTA.  Upon evaluation, pt currently requires Supervision for UB ADLs, Mod A for LB ADLs, Mod A for toileting, Mod A for bed mobility, and Mod A of 2 for functional transfers (backward scooting EOB>Bedside recliner) 2* the following deficits impacting occupational performance: decreased strength , decreased balance, decreased activity tolerance, limited functional reach, impaired sensation, increased pain, and decreased postural control. These impairments, as well at pt’s personal factors of: VITOR home environment, limited home support, difficulty performing ADLs, difficulty performing transfers/mobility, WBS, fall risk , and functional decline  limit pt’s ability to safely engage in all baseline areas of occupation. Pt to continue to benefit from continued acute OT services during hospital stay to address defined deficits and to maximize level of functional independence in the following Occupational Performance areas: bathing/shower, toilet hygiene, dressing, health maintenance, functional mobility, community mobility, clothing management, and social participation. From OT standpoint, recommend STR upon D/C. OT will continue to follow pt 3-5x/wk to address the following goals to  w/in 10-14 days:   Goals   Patient Goals To sit OOB in chair   LTG Time Frame 10-14   Long Term Goal Please refer to LTGs listed below   Plan   Treatment Interventions ADL retraining;Functional transfer training;UE strengthening/ROM; Endurance training;Patient/family training;Equipment evaluation/education; Compensatory technique education;Continued evaluation; Activityengagement   Goal Expiration Date 23   OT Treatment Day 0   OT Frequency 3-5x/wk   Discharge Recommendation   OT Discharge Recommendation Post acute rehabilitation services   Additional Comments  The patient's raw score on the AM-PAC Daily Activity Inpatient Short Form is 16. A raw score of less than 19 suggests the patient may benefit from discharge to post-acute rehabilitation services. Please refer to the recommendation of the Occupational Therapist for safe discharge planning.    -PAC Daily Activity Inpatient Lower Body Dressing 2   Bathing 2   Toileting 2   Upper Body Dressing 3   Grooming 3   Eating 4   Daily Activity Raw Score 16   Daily Activity Standardized Score (Calc for Raw Score >=11) 35.96   AM-PAC Applied Cognition Inpatient   Following a Speech/Presentation 4   Understanding Ordinary Conversation 4   Taking Medications 3   Remembering Where Things Are Placed or Put Away 4   Remembering List of 4-5 Errands 4   Taking Care of Complicated Tasks 3   Applied Cognition Raw Score 22   Applied Cognition Standardized Score 47.83       GOALS    Pt will improve activity tolerance to G for min 30 min txment sessions for increase engagement in functional tasks    Pt will complete bed mobility at a Mod I level w/ G balance/safety demonstrated to decrease caregiver assistance required     Pt will complete UB dressing/self care w/ mod I using adaptive device and DME as needed     Pt will complete LB dressing/self care w/ mod I using adaptive device and DME as needed    Pt will complete toileting w/ mod I w/ G hygiene/thoroughness using DME as needed    Pt will improve functional transfers to Mod I on/off all surfaces using DME as needed w/ G balance/safety     Pt will tolerate continued functional mobility assessment and appropriate goals will be established by OTR as applicable     Pt will be attentive 100% of the time during ongoing cognitive assessment w/ G participation to assist w/ safe d/c planning/recommendations    Pt will demonstrate G carryover of pt/caregiver education and training as appropriate w/o cues w/ good tolerance to increase safety during functional tasks    Pt will increase BUE strength by 1MM grade via AROM exercises to increase independence in ADLs and transfers    Pt will verbalize 3 potential fall hazards and identify appropriate compensatory techniques to decrease fall risk in home environment     Pt will demonstrate ability to perform pressure relief techniques (ie: weight shifts, frequent changes in position, placement of positioning devices- pillows, wedges, etc) at a Mod I level after education from therapist       KYA Huang/SHELLY

## 2023-10-26 NOTE — ASSESSMENT & PLAN NOTE
Wt Readings from Last 3 Encounters:   10/26/23 103 kg (226 lb 3.1 oz)   09/02/23 118 kg (259 lb 7.7 oz)   07/31/23 118 kg (260 lb)     Patient has a history of chronic diastolic congestive heart failure  Most recent 2D echocardiogram 10/22: Left ventricular ejection fraction 61%. Grade 1 diastolic dysfunction.   Patient was placed on IV Lasix due to lower extremity edema  Lungs clear to auscultation  transitioned back to home Lasix 40 mg p.o. twice daily  Currently euvolemic post op

## 2023-10-26 NOTE — ASSESSMENT & PLAN NOTE
Patient presented with bacteremia with Proteus and Bacteroides   Secondary to right foot wound with gangrene  Completed course of antibiotics per ID, with 7d of ceftriaxone and Flagyl-

## 2023-10-26 NOTE — PHYSICAL THERAPY NOTE
PT EVALUATION & TREATMENT    Pt. Name: Jossy Penny  Pt. Age: 58 y.o. MRN: [id number]  LENGTH OF STAY: 7      Admitting Diagnoses:   Gangrene ([address]) [place]hu  Weakness [R53.1]  Elevated troponin [R79.89]  Diabetic ulcer of right heel associated with type 2 diabetes mellitus, with fat layer exposed ([address]) [O28.061, L97.412]    Past Medical History:   Diagnosis Date    Amputated below knee, left ([address]) 10/4/2018    Anxiety     Anxiety and depression     Cataract     Congestive cardiac failure ([address])     Coronary artery disease     Depression     Diabetes mellitus ([address])     Diabetic autonomic neuropathy associated with type 2 diabetes mellitus ([address])     Last Assessed: 12/28/2017    History of DVT (deep vein thrombosis)     left LE    Hyperlipidemia     Lymphedema of right lower extremity     Obesity     Orthostatic hypotension        Past Surgical History:   Procedure Laterality Date    AMPUTATION ABOVE KNEE (AKA) Right 10/25/2023    Procedure: (AKA); Surgeon: Joesph Montano MD;  Location: [facility];  Service: Vascular    CORONARY ARTERY BYPASS GRAFT      EYE SURGERY      cataracts bilateral    LEG AMPUTATION THROUGH LOWER TIBIA AND FIBULA Left 8/3/2018    Procedure: AMPUTATION BELOW KNEE (BKA) L BKA;  Surgeon: Archana Valerio MD;  Location: [facility];  Service: Vascular    KS CORONARY ARTERY BYP W/VEIN & ARTERY GRAFT 4 VEIN N/A 3/28/2018    Procedure: CORONARY ARTERY BYPASS GRAFT (CABG) x3 VESSELS, LIMA TO LAD, SVG--> PDA, SVG--> OM2,  RIGHT LEG EVH/SVH TO , INTRA-OP AMANDEEP;  Surgeon: Rommel Goldstein MD;  Location: [facility];  Service: Cardiac Surgery    ROTATOR CUFF REPAIR Bilateral        Imaging Studies:  VAS lower limb arterial duplex, limited, unilateral   Final Result by Chiqui Alejandro MD (10/20 1729)      CT lower extremity wo contrast right   Final Result by Bryce Siu MD (10/19 1041)      Severe diffuse subcutaneous edema without subcutaneous gas.  Limited evaluation for abscess without IV contrast; no apparent discrete collection. No CT signs of osteomyelitis. The study was marked in [facility] for immediate notification. Workstation performed: [id number]         XR chest 2 views   ED Interpretation by Harley Murillo DO (10/19 2299)   CHEST     INDICATION:   weakness. COMPARISON: Chest radiograph dated 8/29/2021. EXAM PERFORMED/VIEWS:  XR CHEST PA & LATERAL     FINDINGS: Median sternotomy wires. Cardiomediastinal silhouette is stably prominent. No focal airspace consolidation. No pneumothorax or pleural effusion. Old healed right-sided rib fractures. IMPRESSION:     No acute cardiopulmonary disease. Final Result by Marija Lindquist MD (10/19 6405)      No acute cardiopulmonary disease. Workstation performed: [id number]         XR foot 3+ views RIGHT   ED Interpretation by Harley Murillo DO (10/19 5308)   IMPRESSION:     No acute osseous abnormality. Large ulcer overlying the plantar aspect of the calcaneus which appears slightly larger than on the prior study. Final Result by Steph Rachel MD (10/19 4037)      No acute osseous abnormality. Large ulcer overlying the plantar aspect of the calcaneus which appears slightly larger than on the prior study. Resident: Brad Wiggins, the attending radiologist, have reviewed the images and agree with the final report above. Workstation performed: [id number]               10/26/23 1120   PT Last Visit   PT Visit Date 10/26/23   Note Type   Note type Evaluation   Pain Assessment   Pain Score 4   Pain Location/Orientation Orientation: Right;Location: Leg  (stump)   Hospital Pain Intervention(s) Medication (See MAR); Repositioned; Ambulation/increased activity; Emotional support; Rest   Restrictions/Precautions   Weight Bearing Precautions Per Order Yes   RLE Weight Bearing Per Order NWB  (s/p R BKA)   Braces or Orthoses Other (Comment)  (LLE prosthesis)   Other Precautions Chair Alarm; Bed Alarm;Multiple lines; Fall Risk;Pain;WBS  (wound vac to RLE stump; s/p R BKA on 10/25/23)   Home Living   Type of [address] Two level;1/2 bath on main level;Performs ADLs on one level; Able to live on main level with bedroom/bathroom; Other (Comment); Stairs to enter with rails  (3+1 VITOR; pt has 1st floor set up & sleeps in recliner/lift chair at baseline)   Bathroom Shower/Tub   (sponge bathes at baseline)   [address]; Wheelchair-manual;Reacher   Additional Comments Pt presents from [facility] for Lenny. Prior to rehab, pt lives at home w/ wife. Prior Function   Level of [place] Needs assistance with ADLs; Needs assistance with functional mobility; Needs assistance with IADLS;Modified independent with wheelchair  (I w/ ADLs prior to admission in 08/2023, now requiring assist w/ ADLs at rehab)   Lives With Spouse  (who works)   [address] in the last 6 months 0   Vocational On disability   Comments Pt reports that at rehab, he require assistance w/ overall ADL's, IADL's & functional mobility. Pt reports primarily performing SPT w/ LLE prosthesis w/ assistance at rehab. (-) . (+) home alone. General   Additional Pertinent History s/p R BKA on 10/25/23; h/o L BKA on 8/3/2018   Family/Caregiver Present No   Cognition   Overall Cognitive Status WFL   Arousal/Participation Alert   Attention Attends with cues to redirect   Orientation Level Oriented X4   Following Commands Follows one step commands without difficulty   Comments cooperative; pt w/ inc anxiety, require emotional support, encouragement & redirection t/o session   Subjective   Subjective Pt agreeable to PT/OT evals.    RUE Assessment   RUE Assessment   (refer to OT)   LUE Assessment   LUE Assessment   (refer to OT)   RLE Assessment   RLE Assessment X  (3-/5 grossly; limited to pain)   LLE Assessment   LLE Assessment WFL  (4-/5 grossly)   Coordination Movements are Fluid and Coordinated 1   Sensation WFL   Bed Mobility   Supine to Sit 3  Moderate assistance   Additional items Assist x 1;HOB elevated; Bedrails; Increased time required;Verbal cues;LE management   Additional Comments cues for techniques & safety   Transfers   Sit to Stand Unable to assess   Stand to Sit Unable to assess   Other 3  Moderate assistance   Additional items Assist x 2; Increased time required;Verbal cues; Other  (backward scooting EOB to bedside recliner)   Additional Comments sit to stand trial not appropriate at this time 2* to loose fitting LLE prosthesis; completed bed to chair transfer via backward scoot from EOB to bedside recliner chair   Ambulation/Elevation   Gait pattern Not appropriate   Balance   Static Sitting Fair   Dynamic Sitting Poor +   Endurance Deficit   Endurance Deficit Yes   Endurance Deficit Description fatigue; pain; anxiety   Activity Tolerance   Activity Tolerance Patient limited by fatigue;Patient limited by pain;Treatment limited secondary to medical complications (Comment)   Medical Staff Made Aware Sofia Trevino   Nurse Made Aware YI Francis   Assessment   Prognosis Good   Problem List Decreased strength;Decreased endurance; Impaired balance;Decreased mobility; Impaired judgement;Obesity;Pain;Decreased skin integrity;Orthopedic restrictions   Assessment Pt. 63 y.o.male presented from [facility] w/ c/o large ulcer to R foot. Pt admitted for Sepsis [facility]) w/ R foot gangrene & bacteremia. PMHx: L BKA in 2018, CABG x3, orthostatic hypotension, diabetic gastroparesis. S/p R BKA on 10/25/23. Pt referred to PT for mobility assessment & D/C planning w/ orders of OOB to chair. Please see above for information re: home set-up & PLOF as well as objective findings during PT assessment. PTA, pt was at [facility] for rehab following hospitalization in 8/2023.  Pt reports he was requiring (A) w/ ADL's & functional mobility at rehab, primarily performing SPT w/ LLE prosthesis & w/ assistance. On eval, pt functioning below baseline hence will continue skilled PT to improve function & safety. Pt require modAx1 for bed mobility & modAx2 for backward scooting EOB to bedside recliner chair + cues for techniques & safety. Sit to stand trial not appropriate at this time 2* to loose fitting LLE prosthesis. Pt reports that his wife to bring prosthetic socks in. Pt completed bed to chair transfer via backward scooting from EOB to bedside recliner chair. Pt require inc time to complete backward scoot transfers 2* to inc anxiety & fatigue. Pt require frequent rest periods, inc emotional support, encouragement & redirection to complete tasks. Pt tolerated OOB in chair well. After IE, pt able to perform further PT interventions, please see additional tx below for details. The patient's AM-PAC Basic Mobility Inpatient Short Form Raw Score is 10. A Raw score of less than or equal to 16 suggests the patient may benefit from discharge to post-acute rehabilitation services. Please also refer to the recommendation of the Physical Therapist for safe discharge planning. From PT standpoint, due to above mentioned deficits & high risk for falls, pt will benefit from inpt rehab at D/C. No SOB & dizziness reported t/o session. At end of session, pt OOB in chair in stable condition, call bell & phone in reach, chair alarm activated, all lines intact. Fall precautions reinforced w/ good understanding. CM to follow. Nsg staff to continue to mobilized pt (OOB in chair for all meals) as tolerated to prevent further decline in function. Will also recommend Restorative for daily OOB in chair as appropriate to assist Nsg staff. Nsg notified. Co-eval was necessary to complete this PT eval for the pt's best interest given pt's medical acuity & complexity.    Goals   Patient Goals to get OOB to chair   STG Expiration Date 11/09/23   Short Term Goal #1 Goals to be met in 14 days; pt will be able to: 1) inc strength & balance by 1/2 grade to improve overall functional mobility & dec fall risk; 2) inc bed mobility to S for pt to be able to get in/OOB safely w/ proper techniques 100% of the time, to dec caregiver burden & safely function at home; 3) inc transfers to minAx1 w/ appropriate AD & method for pt to transition safely from one surface to another w/o % of the time, to dec caregiver burden & safely function at home; 4) modified I w/ w/c mobility on level surface approx. 150'; 6) pt/caregiver ed   PT Treatment Day 1   Plan   Treatment/Interventions Functional transfer training;LE strengthening/ROM; Therapeutic exercise; Endurance training;Patient/family training;Bed mobility;Gait training;Spoke to nursing;OT   PT Frequency 3-5x/wk   Discharge Recommendation   PT Discharge Recommendation Post acute rehabilitation services   Equipment Recommended Wheelchair  (pt has)   [facility] Mobility Inpatient   Turning in Flat Bed Without Bedrails 3   Lying on Back to Sitting on Edge of Flat Bed Without Bedrails 2   Moving Bed to Chair 2   Standing Up From Chair Using Arms 1   Walk in Room 1   Climb 3-5 Stairs With Railing 1   Basic Mobility Inpatient Raw Score 10   Turning Head Towards Sound 4   Follow Simple Instructions 3   Low Function Basic Mobility Raw Score  17   Low Function Basic Mobility Standardized Score  27.46   Highest Level Of Mobility   JH-HLM Goal 4: Move to chair/commode   JH-HLM Achieved 4: Move to chair/commode   Additional Treatment Session   Start Time 1109   End Time 1120   Treatment Assessment After IE, pt repositioned comfortably in chair. Pt tolerated OOB in chair well. Pt able to perform & tolerated BLE thera. ex well as mentioned below, AROM. RN educated on forward/backward scooting method w/ good understanding, to be able to assist pt in/OOB to chair as appropriate. Will trial SPT w/ LLE prosthesis once proper prosthetic fitting achieved. Will continue PT per POC. Will continue to recommend inpt rehab at D/C. Additional Treatment Day 1   Exercises   Hip Flexion Sitting;10 reps;AROM; Bilateral   Hip Abduction Sitting;10 reps;AROM; Bilateral   Hip Adduction Sitting;10 reps;AROM; Bilateral   Knee AROM Long Arc Quad Sitting;10 reps;AROM; Bilateral   End of Consult   Patient Position at End of Consult Bedside chair;Bed/Chair alarm activated; All needs within reach   End of Consult Comments Pt in stable condition.  All needs in reach   Hx/personal factors: co-morbidities, inaccessible home, dec caregiver support, mutliple lines, use of AD, pain, WB restrictions, fall risk, assist w/ ADL's, obesity, and s/p R BKA & h/o L BKA  Examination: dec mobility, dec balance, dec endurance, dec amb, risk for falls, pain, WB restrictions  Clinical: unpredictable (ongoing medical status, abnormal lab values, risk for falls, and pain mgt)  Complexity: high    [employer]

## 2023-10-26 NOTE — ASSESSMENT & PLAN NOTE
Patient presented with gangrene of the right heel  Had gotten progressively worse since his previous admission 8/2023  Patient was evaluated by podiatry and vascular surgery:  Foot deemed nonsalvageable  AKA performed by vascular surgery 10/25  First post op dressing change tomorrow  VAC in place until 10/30  Cont pain medications  Pt will need acute rehab as he is now a bilateral amputee

## 2023-10-26 NOTE — ASSESSMENT & PLAN NOTE
Lab Results   Component Value Date    HGBA1C 6.7 (A) 02/02/2023       Recent Labs     10/25/23  1616 10/25/23  2036 10/26/23  0715 10/26/23  1158   POCGLU 114 101 126 137     Patient with poor p.o. intake and borderline blood sugars  decreased regimen to avoid hypoglycemia  Home regimen is Lantus 40 units twice daily:   Patient with low-normal blood sugars despite Lantus being held :  due to poor po intake  Cont accu-Checks and sliding scale insulin  Anticipate will increase regimen once appetite returns

## 2023-10-26 NOTE — PLAN OF CARE
Problem: OCCUPATIONAL THERAPY ADULT  Goal: Performs self-care activities at highest level of function for planned discharge setting. See evaluation for individualized goals. Description: Treatment Interventions: ADL retraining, Functional transfer training, UE strengthening/ROM, Endurance training, Patient/family training, Equipment evaluation/education, Compensatory technique education, Continued evaluation, Activityengagement          See flowsheet documentation for full assessment, interventions and recommendations. Note: Limitation: Decreased ADL status, Decreased UE strength, Decreased endurance, Decreased sensation, Decreased self-care trans, Decreased high-level ADLs  Prognosis: Good  Assessment: Pt is a 58 y.o. male seen for OT evaluation s/p adm to [address] [facility] on 10/19/2023 w/ weakness and chills. Pt admitted w/ Sepsis, gangrene of R heel. Pt now s/p R AKA on 10/25/23. Of note,  Patient was most recently admitted 8/29 - 9/2 for right heel diabetic ulcer patient was treated with IV antibiotics and discharged on oral antibiotics to short-term rehab at [facility]. Comorbidities affecting pt’s functional performance include a significant PMH of DM, PAD, Chronic R heel ulcer, and hx of L BKA. Pt with active OT orders and activity orders for Up and OOB as tolerated. Pt presents from [facility] for rehab. Prior to rehab, Pt lives with spouse in a two level house with 3+1 VITOR and 1st floor setup. Pt's spouse works, (+) home alone. At rehab, pt required assist w/ ADLs, IADLs, and functional transfers/mobility. Pt reports primarily performing SPTs at rehab with assistance. Mod I for W/C mobility. (-) . Denies falls PTA.  Upon evaluation, pt currently requires Supervision for UB ADLs, Mod A for LB ADLs, Mod A for toileting, Mod A for bed mobility, and Mod A of 2 for functional transfers (backward scooting EOB>Bedside recliner) 2* the following deficits impacting occupational performance: decreased strength , decreased balance, decreased activity tolerance, limited functional reach, impaired sensation, increased pain, and decreased postural control. These impairments, as well at pt’s personal factors of: VITOR home environment, limited home support, difficulty performing ADLs, difficulty performing transfers/mobility, WBS, fall risk , and functional decline  limit pt’s ability to safely engage in all baseline areas of occupation. Pt to continue to benefit from continued acute OT services during hospital stay to address defined deficits and to maximize level of functional independence in the following Occupational Performance areas: bathing/shower, toilet hygiene, dressing, health maintenance, functional mobility, community mobility, clothing management, and social participation. From OT standpoint, recommend STR upon D/C.  OT will continue to follow pt 3-5x/wk to address the following goals to  w/in 10-14 days:     OT Discharge Recommendation: Post acute rehabilitation services

## 2023-10-26 NOTE — PLAN OF CARE
Problem: PHYSICAL THERAPY ADULT  Goal: Performs mobility at highest level of function for planned discharge setting. See evaluation for individualized goals. Description: Treatment/Interventions: Functional transfer training, LE strengthening/ROM, Therapeutic exercise, Endurance training, Patient/family training, Bed mobility, Gait training, Spoke to nursing, OT  Equipment Recommended: Wheelchair (pt has)       See flowsheet documentation for full assessment, interventions and recommendations. Note: Prognosis: Good  Problem List: Decreased strength, Decreased endurance, Impaired balance, Decreased mobility, Impaired judgement, Obesity, Pain, Decreased skin integrity, Orthopedic restrictions    Assessment: Pt. 58 y.o.male presented from [facility] w/ c/o large ulcer to R foot. Pt admitted for Sepsis [facility]) w/ R foot gangrene & bacteremia. PMHx: L BKA in 2018, CABG x3, orthostatic hypotension, diabetic gastroparesis. S/p R BKA on 10/25/23. Pt referred to PT for mobility assessment & D/C planning w/ orders of OOB to chair. Please see above for information re: home set-up & PLOF as well as objective findings during PT assessment. PTA, pt was at [facility] for rehab following hospitalization in 8/2023. Pt reports he was requiring (A) w/ ADL's & functional mobility at rehab, primarily performing SPT w/ LLE prosthesis & w/ assistance. On eval, pt functioning below baseline hence will continue skilled PT to improve function & safety. Pt require modAx1 for bed mobility & modAx2 for backward scooting EOB to bedside recliner chair + cues for techniques & safety. Sit to stand trial not appropriate at this time 2* to loose fitting LLE prosthesis. Pt reports that his wife to bring prosthetic socks in. Pt completed bed to chair transfer via backward scooting from EOB to bedside recliner chair. Pt require inc time to complete backward scoot transfers 2* to inc anxiety & fatigue.  Pt require frequent rest periods, inc emotional support, encouragement & redirection to complete tasks. Pt tolerated OOB in chair well. After IE, pt able to perform further PT interventions, please see additional tx below for details. The patient's AM-PAC Basic Mobility Inpatient Short Form Raw Score is 10. A Raw score of less than or equal to 16 suggests the patient may benefit from discharge to post-acute rehabilitation services. Please also refer to the recommendation of the Physical Therapist for safe discharge planning. From PT standpoint, due to above mentioned deficits & high risk for falls, pt will benefit from inpt rehab at D/C. No SOB & dizziness reported t/o session. At end of session, pt OOB in chair in stable condition, call bell & phone in reach, chair alarm activated, all lines intact. Fall precautions reinforced w/ good understanding. CM to follow. Nsg staff to continue to mobilized pt (OOB in chair for all meals) as tolerated to prevent further decline in function. Will also recommend Restorative for daily OOB in chair as appropriate to assist Nsg staff. Nsg notified. Co-eval was necessary to complete this PT eval for the pt's best interest given pt's medical acuity & complexity. PT Discharge Recommendation: Post acute rehabilitation services    See flowsheet documentation for full assessment.

## 2023-10-26 NOTE — PROGRESS NOTES
Progress Note - Infectious Disease   Jossy Penny 58 y.o. male MRN: [id number]  Unit/Bed#: E4 -01 Encounter: [id number]      IMPRESSION & RECOMMENDATIONS:   Impression/Recommendations:  1. Sepsis, POA. Tachycardia, tachypnea, leukocytosis. Secondary to bacteremia. Fortunately, patient is afebrile and hemodynamically stable. WBC count has improved, now slightly up in the immediate postoperative setting.     -Antibiotic plan as below  -Follow temperatures and hemodynamics  -Follow CBC     2. Polymicrobial bacteremia. Proteus/Bacteroides. Secondary to #3. No other clear explanation. Prior wound culture from the foot did isolate Proteus, in addition to E. coli and anaerobes. -Continue ceftriaxone/Flagyl through today to complete 7-day course of appropriate IV antibiotic, including 24 hours postop. 3.  Progressive right heel gangrene. Prior wound cultures August 2023 with growth of E. coli, Proteus, Bacteroides. CT without subcutaneous gas or discrete abscess. Podiatry evaluation noted and foot has been deemed nonsalvageable. Patient is now status post right AKA on 10/25.     -No further antibiotic indicated for this.  -Vascular surgery follow-up ongoing. 4.  Diabetes mellitus type 2, with peripheral neuropathy and long-term insulin use. Risk factor for infection and poor wound healing. 5.  PAD post left BKA 2018. 6.  Elevated troponin. Cardiology input noted with suspicion for demand ischemia in the setting of acute infection. Patient had prior CABG in 2018. Cardiology follow-up ongoing. Antibiotics:  Antibiotic 8  Ceftriaxone/Flagyl  POD1    Discussed above plan with Dr. Michael Coello. Subjective:  Patient underwent AKA yesterday. No fevers. Pain relatively well controlled.     Objective:  Vitals:  Temp:  [96.5 °F (35.8 °C)-97.5 °F (36.4 °C)] 97.1 °F (36.2 °C)  HR:  [71-84] 84  Resp:  [12-18] 16  BP: ()/(52-71) 105/52  SpO2:  [92 %-99 %] 92 %  Temp (24hrs), Av.1 °F (36.2 °C), Min:96.5 °F (35.8 °C), Max:97.5 °F (36.4 °C)  Current: Temperature: (!) 97.1 °F (36.2 °C)    Physical Exam:   General:  No acute distress, nontoxic  HEENT: Atraumatic normocephalic  Neck: Trachea midline  Psychiatric:  Awake and alert  Pulmonary:  Normal respiratory excursion without accessory muscle use  Abdomen: Nondistended  Extremities: Right AKA dressing intact  Skin:  No rashes  Neuro moves all extremities spontaneously.:    Lab Results:  I have personally reviewed pertinent labs. Results from last 7 days   Lab Units 10/26/23  0453 10/25/23  0533 10/23/23  0641 10/21/23  0617 10/20/23  0505 10/19/23  1250   POTASSIUM mmol/L 3.9 3.6 3.6   < > 4.0 4.8   CHLORIDE mmol/L 99 98 96   < > 96 94*   CO2 mmol/L 29 30 31   < > 27 28   BUN mg/dL 16 18 28*   < > 13 16   CREATININE mg/dL 1.27 0.87 0.86   < > 0.82 0.95   EGFR ml/min/1.73sq m 59 91 91   < > 93 84   CALCIUM mg/dL 7.7* 8.5 8.4   < > 8.1* 8.9   AST U/L  --   --   --   --  15 17   ALT U/L  --   --   --   --  11 13   ALK PHOS U/L  --   --   --   --  52 69    < > = values in this interval not displayed. Results from last 7 days   Lab Units 10/26/23  0453 10/25/23  0533 10/23/23  0641   WBC Thousand/uL 11.89* 9.91 7.63   HEMOGLOBIN g/dL 9.9* 12.4 11.7*   PLATELETS Thousands/uL 314 361 317     Results from last 7 days   Lab Units 10/19/23  1305 10/19/23  1250   BLOOD CULTURE  Proteus mirabilis*  Bacteroides thetaiotaomicron group* Proteus mirabilis*  Bacteroides thetaiotaomicron group*   GRAM STAIN RESULT  Gram negative rods* Gram negative rods*       Imaging Studies:   I have personally reviewed pertinent imaging study reports and images in PACS. EKG, Pathology, and Other Studies:   I have personally reviewed pertinent reports. Operative report was personally reviewed.

## 2023-10-26 NOTE — PROGRESS NOTES
[address] [facility]  Progress Note  Name: Staci Miguel  MRN: [id number]  Unit/Bed#: E4 -01 I Date of Admission: 10/19/2023   Date of Service: 10/26/2023 I Hospital Day: 7    Assessment/Plan   PAD (peripheral artery disease) [facility])  Assessment & Plan  57 yo male former smoker (quit 1994) with hx of DM II, CAD S/P CABG w/ R GSV, L BKA (by Dr. Epifanio Wong 8/3/18 for tissue loss), PAD and chronic R heel ulcer presented to [facility] on 10/19/23 w/ weakness, chills, nausea and vomiting. Pt admitted w/ worsening R foot wound and proteus bacteremia, likely secondary to foot wound. R foot xray shows large ulcer overlying plantar aspect of calcaneous. CT RLE shows severe diffuse subcutaneous edema without subcutaneous gas; (-) for OM. Pt seen in consult by podiatry for R heel wound. Per podiatry, limb salvage is unlikely and pt will need amputation of RLE. Vascular surgery consulted for higher level amputation. Recommendation was above-knee amputation in setting of infection, severe lymphedema, skin changes, and existing contralateral amputation. Pt is S/P R AKA on 10/25 w/ uneventful post-op course. Diagnostics:   -10/22/23: Echo: LVEF 35% w/ normal systolic function and mildly abnormal diastolic dysfunction  -ISAK 10/20/23: R YUDITH: 0.95/7/-; Diffuse atherosclerotic disease, 50-75% prox SFA x 2; unable to visualize calf vessels due to edema, depth  -CT RLE 10/19/23: severe diffuse subcutaneous edema without subcutaneous gas; no OM. -Xray R foot 10/19/23: large ulcer overlying plantar aspect of calcaneous.     Plan:  -Nonsalvageable RLE secondary to infected heal wound, per podiatry  -S/P R AKA on 10/25  -First AKA drsg change on POD #2  -Continue prevena vac to surgical incision; remove on 10/30  -Sepsis bacteremia: BC (+) proteus; continue rocephin and flagyl per SLIM and ID  -Continue ASA, plavix and lipitor  -Maintain good diabetes control  -Continue medical management per primary team  -Discussed w/ Dr. Philomena Palomino      Subjective:  Pt seen for exam while sitting upright in bed; NAD. Per pt, he is not sleeping well as he typically sleeps in a recliner chair at home. He reports pain from a "small skin slit on his backside" which also made it difficult for him to sleep. Pt reports minimal pain from his AKA incision site and otherwise, denies any further complaints at this time. Vitals:  /63 (BP Location: Left arm)   Pulse 75   Temp (!) 96.5 °F (35.8 °C) (Temporal)   Resp 18   Ht 6' 1" (1.854 m)   Wt 103 kg (226 lb 3.1 oz)   SpO2 92%   BMI 29.84 kg/m²     I/Os:  I/O last 3 completed shifts: In: 1960 [P.O.:60; I.V.:1400; IV Piggyback:500]  Out: 1236 [Urine:1550]  No intake/output data recorded.     Lab Results and Cultures:   Lab Results   Component Value Date    WBC 11.89 (H) 10/26/2023    HGB 9.9 (L) 10/26/2023    HCT 31.0 (L) 10/26/2023    MCV 85 10/26/2023     10/26/2023     Lab Results   Component Value Date    GLUCOSE 165 (H) 03/28/2018    CALCIUM 7.7 (L) 10/26/2023     09/13/2016    K 3.9 10/26/2023    CO2 29 10/26/2023    CL 99 10/26/2023    BUN 16 10/26/2023    CREATININE 1.27 10/26/2023     Lab Results   Component Value Date    INR 1.08 10/19/2023    INR 1.08 08/29/2023    INR 0.99 08/29/2021    PROTIME 14.0 10/19/2023    PROTIME 14.0 08/29/2023    PROTIME 12.9 08/29/2021     Blood Culture:   Lab Results   Component Value Date    BLOODCX Proteus mirabilis (A) 10/19/2023    BLOODCX Bacteroides thetaiotaomicron group (A) 10/19/2023     Urinalysis:   Lab Results   Component Value Date    COLORU Light Yellow 10/26/2023    CLARITYU Clear 10/26/2023    SPECGRAV 1.013 10/26/2023    PHUR 5.5 10/26/2023    PHUR 7.5 05/23/2018    LEUKOCYTESUR Negative 10/26/2023    NITRITE Negative 10/26/2023    GLUCOSEU Negative 10/26/2023    KETONESU 10 (1+) (A) 10/26/2023    BILIRUBINUR Negative 10/26/2023    BLOODU Trace (A) 10/26/2023         Medications:  Current Facility-Administered Medications Medication Dose Route Frequency    acetaminophen (TYLENOL) tablet 650 mg  650 mg Oral Q6H PRN    ALPRAZolam (XANAX) tablet 0.25 mg  0.25 mg Oral BID PRN    aspirin (ECOTRIN LOW STRENGTH) EC tablet 81 mg  81 mg Oral Daily    atorvastatin (LIPITOR) tablet 80 mg  80 mg Oral Daily With Dinner    cefTRIAXone (ROCEPHIN) IVPB (premix in dextrose) 2,000 mg 50 mL  2,000 mg Intravenous Q24H    clopidogrel (PLAVIX) tablet 75 mg  75 mg Oral Daily    docusate sodium (COLACE) capsule 100 mg  100 mg Oral BID    enoxaparin (LOVENOX) subcutaneous injection 40 mg  40 mg Subcutaneous Daily    finasteride (PROSCAR) tablet 5 mg  5 mg Oral Daily    furosemide (LASIX) tablet 40 mg  40 mg Oral BID (diuretic)    influenza vaccine, quadrivalent (FLUARIX) IM injection 0.5 mL  0.5 mL Intramuscular Once    insulin lispro (HumaLOG) 100 units/mL subcutaneous injection 1-5 Units  1-5 Units Subcutaneous TID AC    magnesium hydroxide (MILK OF MAGNESIA) oral suspension 30 mL  30 mL Oral Daily PRN    melatonin tablet 6 mg  6 mg Oral HS    metoclopramide (REGLAN) injection 5 mg  5 mg Intravenous Q6H [place]    metroNIDAZOLE (FLAGYL) IVPB (premix) 500 mg 100 mL  500 mg Intravenous Q8H    morphine injection 2 mg  2 mg Intravenous Q4H PRN    ondansetron (ZOFRAN) injection 4 mg  4 mg Intravenous Q4H PRN    oxyCODONE (ROXICODONE) immediate release tablet 10 mg  10 mg Oral Q4H PRN    oxyCODONE (ROXICODONE) IR tablet 5 mg  5 mg Oral Q4H PRN    pantoprazole (PROTONIX) EC tablet 40 mg  40 mg Oral Early Morning    senna (SENOKOT) tablet 8.6 mg  1 tablet Oral HS    tamsulosin (FLOMAX) capsule 0.4 mg  0.4 mg Oral Daily With Dinner       Imaging:  See above    Physical Exam:    General appearance: alert and oriented, in no acute distress  Skin: Skin color, texture, turgor normal. No rashes or lesions  Neurologic: Grossly normal  Head: Normocephalic, without obvious abnormality, atraumatic  Lungs: clear to auscultation bilaterally  Heart: regular rate and rhythm  Abdomen: soft, non-tender; bowel sounds normal; no masses,  no organomegaly  Extremities:  extremities warm, well-perfused; L BKA, R AKA    Wound/Incision:  R AKA w/ prevena vac and ACE wrap intact    Pulse exam:  Radial: Right: 2+ Left[de-identified] 2+      CARLTON Martinez  10/26/2023

## 2023-10-26 NOTE — PROGRESS NOTES
Vascular Nurse Navigator Post Op Education    Met with patient and spouse and wife Radha Major (via phone) at bedside to introduce myself as Vascular Nurse Navigator and explained my role. Patient is appropriate and accepting to education. Patient was educated with Review of written materials provided, Teachback, Explanation, Demonstration, and Question & Answer on expectations of post op care and recovery on Right AKA. Patient is a former smoker, quit 29 years ago, as such Smoking effects on the lungs, tobacco triggers, and Smoking cessation was reviewed. Education provided to patient and his wife Radha Major (via phone) on infection prevention, activity limitations, when to call the office, importance of follow up, and incisional care. Discharge instruction handout provided to patient to review. Provided patient with information on [facility] and a list of prosthetic providers.

## 2023-10-26 NOTE — ASSESSMENT & PLAN NOTE
Patient follows with vascular surgery outpatient  LEADS: "Diffuse atherosclerotic arterial disease noted, with a 50-75% stenosis in the  proximal superficial femoral artery and a 50-75% stenosis in the proximal superficial femoral artery"  vascular surgery performed AKA 10/25  Continue aspirin, Plavix, statin

## 2023-10-26 NOTE — ASSESSMENT & PLAN NOTE
History of CAD status post CABG  Continue on aspirin, Plavix, statin  Elevated troponin on admission attributed to non-MI troponin elevation secondary to sepsis  He was evaluated by the cardiology team, no evidence of ischemia  Post-operatively pt was hemodynamically stable:  EKG with inf-lat TWI-  seen on prior EKGs in 4/23  No arrhythmias on tele  Cont medical management

## 2023-10-26 NOTE — ASSESSMENT & PLAN NOTE
Patient is a 34-year-old male with past medical history significant for diabetes, hypertension, hyperlipidemia, and peripheral arterial disease who presented to the ER with sepsis     he presented with sepsis, present on admission, with tachycardia, and leukocytosis   Sepsis secondary to bacteremia secondary to right heel wound   Patient was followed by the infectious disease team, and podiatry teams  AKA performed 10/25 with surgical cure  S/p  IV antibiotics:    Per ID:  will dc abx now that pt has completed 7d course

## 2023-10-26 NOTE — ASSESSMENT & PLAN NOTE
59 yo male former smoker (quit 1994) with hx of DM II, CAD S/P CABG w/ R GSV, L BKA (by Dr. Murguia Scale 8/3/18 for tissue loss), PAD and chronic R heel ulcer presented to [facility] on 10/19/23 w/ weakness, chills, nausea and vomiting. Pt admitted w/ worsening R foot wound and proteus bacteremia, likely secondary to foot wound. R foot xray shows large ulcer overlying plantar aspect of calcaneous. CT RLE shows severe diffuse subcutaneous edema without subcutaneous gas; (-) for OM. Pt seen in consult by podiatry for R heel wound. Per podiatry, limb salvage is unlikely and pt will need amputation of RLE. Vascular surgery consulted for higher level amputation. Recommendation was above-knee amputation in setting of infection, severe lymphedema, skin changes, and existing contralateral amputation. Pt is S/P R AKA on 10/25 w/ uneventful post-op course. Diagnostics:   -10/22/23: Echo: LVEF 80% w/ normal systolic function and mildly abnormal diastolic dysfunction  -ISAK 10/20/23: R YUDITH: 0.95/7/-; Diffuse atherosclerotic disease, 50-75% prox SFA x 2; unable to visualize calf vessels due to edema, depth  -CT RLE 10/19/23: severe diffuse subcutaneous edema without subcutaneous gas; no OM. -Xray R foot 10/19/23: large ulcer overlying plantar aspect of calcaneous.     Plan:  -Nonsalvageable RLE secondary to infected heal wound, per podiatry  -S/P R AKA on 10/25  -First AKA drsg change on POD #2  -Continue prevena vac to surgical incision; remove on 10/30  -Sepsis bacteremia: BC (+) proteus; continue rocephin and flagyl per SLIM and ID  -Continue ASA, plavix and lipitor  -Maintain good diabetes control  -Continue medical management per primary team  -Discussed w/ Dr. Linda Bonilla

## 2023-10-26 NOTE — PROGRESS NOTES
[address] [facility]  Progress Note  Name: Page Garcia  MRN: [id number]  Unit/Bed#: E4 -01 I Date of Admission: 10/19/2023   Date of Service: 10/26/2023 I Hospital Day: 7    Assessment/Plan   * Sepsis [facility])  Assessment & Plan  Patient is a 39-year-old male with past medical history significant for diabetes, hypertension, hyperlipidemia, and peripheral arterial disease who presented to the ER with sepsis     he presented with sepsis, present on admission, with tachycardia, and leukocytosis   Sepsis secondary to bacteremia secondary to right heel wound   Patient was followed by the infectious disease team, and podiatry teams  AKA performed 10/25 with surgical cure  S/p  IV antibiotics:    Per ID:  will dc abx now that pt has completed 7d course    Gangrene [facility])  Assessment & Plan  Patient presented with gangrene of the right heel  Had gotten progressively worse since his previous admission 8/2023  Patient was evaluated by podiatry and vascular surgery:  Foot deemed nonsalvageable  AKA performed by vascular surgery 10/25  First post op dressing change tomorrow  VAC in place until 10/30  Cont pain medications  Pt will need acute rehab as he is now a bilateral amputee    Bacteremia due to Proteus species  Assessment & Plan  Patient presented with bacteremia with Proteus and Bacteroides   Secondary to right foot wound with gangrene  Completed course of antibiotics per ID, with 7d of ceftriaxone and Flagyl-    PAD (peripheral artery disease) ([address])  Assessment & Plan  Patient follows with vascular surgery outpatient  LEADS: "Diffuse atherosclerotic arterial disease noted, with a 50-75% stenosis in the  proximal superficial femoral artery and a 50-75% stenosis in the proximal superficial femoral artery"  vascular surgery performed AKA 10/25  Continue aspirin, Plavix, statin      Elevated troponin  Assessment & Plan  Patient had an elevated troponin, secondary to non-MI troponin elevation secondary to sepsis   Patient was evaluated by the cardiology team  No evidence of acute ischemia  Continue home medication regimen with aspirin, Plavix, statin    S/P CABG x 3  Assessment & Plan  History of CAD status post CABG  Continue on aspirin, Plavix, statin  Elevated troponin on admission attributed to non-MI troponin elevation secondary to sepsis  He was evaluated by the cardiology team, no evidence of ischemia  Post-operatively pt was hemodynamically stable:  EKG with inf-lat TWI-  seen on prior EKGs in 4/23  No arrhythmias on tele  Cont medical management    Chronic heart failure with preserved ejection fraction [facility])  Assessment & Plan  Wt Readings from Last 3 Encounters:   10/26/23 103 kg (226 lb 3.1 oz)   09/02/23 118 kg (259 lb 7.7 oz)   07/31/23 118 kg (260 lb)     Patient has a history of chronic diastolic congestive heart failure  Most recent 2D echocardiogram 10/22: Left ventricular ejection fraction 61%. Grade 1 diastolic dysfunction.   Patient was placed on IV Lasix due to lower extremity edema  Lungs clear to auscultation  transitioned back to home Lasix 40 mg p.o. twice daily  Currently euvolemic post op    Diabetic gastroparesis   Assessment & Plan  patient with previous retching after admission:  resolved after trial of scheduled reglan  Possibly secondary to gastroparesis, versus intolerance to antibiotics especially Flagyl  Pt again had vomiting post op-  zofran not effective in past:  cont reglan- pt wishes it be ordered prn and not scheduled    Orthostatic hypotension  Assessment & Plan  Was previously prescribed midodrine 2.5 mg 3 times daily however only takes this as needed  Monitor perioperatively:  bp low normal:  may require restarting midodrine depending on bp during PT    Type 2 diabetes mellitus with diabetic neuropathy, with long-term current use of insulin [facility])  Assessment & Plan  Lab Results   Component Value Date    HGBA1C 6.7 (A) 02/02/2023       Recent Labs     10/25/23  1616 10/25/23  2036 10/26/23  0715 10/26/23  1158   POCGLU 114 101 126 137     Patient with poor p.o. intake and borderline blood sugars  decreased regimen to avoid hypoglycemia  Home regimen is Lantus 40 units twice daily:   Patient with low-normal blood sugars despite Lantus being held :  due to poor po intake  Cont accu-Checks and sliding scale insulin  Anticipate will increase regimen once appetite returns    Respiratory insufficiency-resolved as of 10/23/2023  Assessment & Plan  Acute hypoxic respiratory distress possibly secondary to atelectasis from generalized weakness and pain on admission  Chest x-ray negative for any acute cardiopulmonary disease  Was initially requiring 2 L oxygen, since weaned off:   Patient was monitored postoperatively. Did not have any additional hypoxia or decompensation. Currently with adequate oxygenation on room air                 Family:  called wife Joe Salinas and LM to call my cell phone for update    Dw pts nurse and cm    VTE Pharmacologic Prophylaxis: Enoxaparin (Lovenox)  VTE Mechanical Prophylaxis: sequential compression device        Certification Statement: The patient will continue to require additional inpatient hospital stay due to need for further acute intervention for post op care:  VAC    Status: inpatient     ===================================================================    Subjective:  Pt notes he is much more comfortable sitting up in the recliner, than he was in the bed. Notes discomfort in his buttocks. No current incisional pain. Denies any pain anywhere else. Did not have any n/v until just now. Notes reglan was helping. Requests it be just prn and not scheduled. Denies any abd pain. Notes prior suprapubic pain resolved. Feels he is passing urine without any difficulty. Denies any d/c. Poor appetite. Denies and chest pain. Denies any sob.       Physical Exam:   Temp:  [96.5 °F (35.8 °C)-97.5 °F (36.4 °C)] 97.1 °F (36.2 °C)  HR:  Fortino Stevens 84  Resp:  [16-18] 16  BP: ()/(52-71) 105/52    Gen:  Pleasant, non-tachypnic, non-dyspnic. Conversant. Heart: regular rate and rhythm, S1S2 present, no murmur, rub or gallop  Lungs: clear to ausculatation bilaterally. No wheezing, crackles, or rhonchi. No accessory muscle use or respiratory distress. Abd: soft, non-tender, non-distended. NABS, no guarding, rebound or peritoneal signs. Extremities: R AKA dressing in place. Prior L BKA site c/d/i  Neuro: awake, alert. Fluent speech. LABS:   Results from last 7 days   Lab Units 10/26/23  0453 10/25/23  0533 10/23/23  0641   WBC Thousand/uL 11.89* 9.91 7.63   HEMOGLOBIN g/dL 9.9* 12.4 11.7*   HEMATOCRIT % 31.0* 39.3 36.5   PLATELETS Thousands/uL 314 361 317     Results from last 7 days   Lab Units 10/26/23  0453 10/25/23  0533 10/23/23  0641   POTASSIUM mmol/L 3.9 3.6 3.6   CHLORIDE mmol/L 99 98 96   CO2 mmol/L 29 30 31   BUN mg/dL 16 18 28*   CREATININE mg/dL 1.27 0.87 0.86   CALCIUM mg/dL 7.7* 8.5 8.4       Hospital Data:    10/22: Echocardiogram:    Left Ventricle: Left ventricular cavity size is normal. Wall thickness is moderately increased. The left ventricular ejection fraction is 61%. Systolic function is normal. Wall motion cannot be accurately assessed. Diastolic function is mildly abnormal, consistent with grade I (abnormal) relaxation. Right Ventricle: Systolic function is mildly reduced. 10/20 LE ADS  IGHT LOWER LIMB:  Diffuse atherosclerotic arterial disease noted, with a 50-75% stenosis in the  proximal superficial femoral artery and a 50-75% stenosis in the proximal  superficial femoral artery. Unable to visualize calf vessels due to edema, depth, induration and thickened,  scaly skin. Ankle/Brachial index: supra-normal, consistent with poorly compressible  vessels. Prior . 95 (2020 study). Metatarsal pressure of  7 mm Hg, however, may be unreliable. Great toe pressure of unobtainable, due to flatline ppg waveform.   PVR/ PPG tracings are dampened at the ankle, severely attenuated in the  metatarsal/toe. LEFT LOWER LIMB:  BKA  Compared to previous study on 4/18/2023, there are changes and findings as  described above. 10/19: CT right lower extremity  Severe diffuse subcutaneous edema without subcutaneous gas. Limited evaluation for abscess without IV contrast; no apparent discrete collection. No CT signs of osteomyelitis. 10/19 chest x-ray  No acute cardiopulmonary disease. 10/19 right foot x-ray  No acute osseous abnormality. Large ulcer overlying the plantar aspect of the calcaneus which appears slightly larger than on the prior study. Microbiology  10/19: Blood culture: Proteus, Bacteroides x2  10/19: Negative COVID, influenza, RSV           ---------------------------------------------------------------------------------------------------------------  This note has been constructed using a voice recognition system.

## 2023-10-26 NOTE — ASSESSMENT & PLAN NOTE
Acute hypoxic respiratory distress possibly secondary to atelectasis from generalized weakness and pain on admission  Chest x-ray negative for any acute cardiopulmonary disease  Was initially requiring 2 L oxygen, since weaned off:   Patient was monitored postoperatively. Did not have any additional hypoxia or decompensation.   Currently with adequate oxygenation on room air

## 2023-10-26 NOTE — ASSESSMENT & PLAN NOTE
patient with previous retching after admission:  resolved after trial of scheduled reglan  Possibly secondary to gastroparesis, versus intolerance to antibiotics especially Flagyl  Pt again had vomiting post op-  zofran not effective in past:  cont reglan- pt wishes it be ordered prn and not scheduled

## 2023-10-27 LAB
BASOPHILS # BLD AUTO: 0.04 THOUSANDS/ÂΜL (ref 0–0.1)
BASOPHILS NFR BLD AUTO: 0 % (ref 0–1)
EOSINOPHIL # BLD AUTO: 0.12 THOUSAND/ÂΜL (ref 0–0.61)
EOSINOPHIL NFR BLD AUTO: 1 % (ref 0–6)
ERYTHROCYTE [DISTWIDTH] IN BLOOD BY AUTOMATED COUNT: 15.2 % (ref 11.6–15.1)
GLUCOSE SERPL-MCNC: 131 MG/DL (ref 65–140)
GLUCOSE SERPL-MCNC: 145 MG/DL (ref 65–140)
GLUCOSE SERPL-MCNC: 152 MG/DL (ref 65–140)
GLUCOSE SERPL-MCNC: 169 MG/DL (ref 65–140)
HCT VFR BLD AUTO: 29.5 % (ref 36.5–49.3)
HGB BLD-MCNC: 9.1 G/DL (ref 12–17)
IMM GRANULOCYTES # BLD AUTO: 0.05 THOUSAND/UL (ref 0–0.2)
IMM GRANULOCYTES NFR BLD AUTO: 0 % (ref 0–2)
LYMPHOCYTES # BLD AUTO: 1.64 THOUSANDS/ÂΜL (ref 0.6–4.47)
LYMPHOCYTES NFR BLD AUTO: 14 % (ref 14–44)
MCH RBC QN AUTO: 26.5 PG (ref 26.8–34.3)
MCHC RBC AUTO-ENTMCNC: 30.8 G/DL (ref 31.4–37.4)
MCV RBC AUTO: 86 FL (ref 82–98)
MONOCYTES # BLD AUTO: 0.71 THOUSAND/ÂΜL (ref 0.17–1.22)
MONOCYTES NFR BLD AUTO: 6 % (ref 4–12)
NEUTROPHILS # BLD AUTO: 8.81 THOUSANDS/ÂΜL (ref 1.85–7.62)
NEUTS SEG NFR BLD AUTO: 79 % (ref 43–75)
NRBC BLD AUTO-RTO: 0 /100 WBCS
PLATELET # BLD AUTO: 315 THOUSANDS/UL (ref 149–390)
PMV BLD AUTO: 9.2 FL (ref 8.9–12.7)
RBC # BLD AUTO: 3.43 MILLION/UL (ref 3.88–5.62)
WBC # BLD AUTO: 11.37 THOUSAND/UL (ref 4.31–10.16)

## 2023-10-27 PROCEDURE — 99024 POSTOP FOLLOW-UP VISIT: CPT | Performed by: SURGERY

## 2023-10-27 PROCEDURE — 82948 REAGENT STRIP/BLOOD GLUCOSE: CPT

## 2023-10-27 PROCEDURE — G0008 ADMIN INFLUENZA VIRUS VAC: HCPCS

## 2023-10-27 PROCEDURE — 85025 COMPLETE CBC W/AUTO DIFF WBC: CPT | Performed by: INTERNAL MEDICINE

## 2023-10-27 PROCEDURE — 99232 SBSQ HOSP IP/OBS MODERATE 35: CPT | Performed by: INTERNAL MEDICINE

## 2023-10-27 PROCEDURE — 90686 IIV4 VACC NO PRSV 0.5 ML IM: CPT

## 2023-10-27 PROCEDURE — 99232 SBSQ HOSP IP/OBS MODERATE 35: CPT | Performed by: FAMILY MEDICINE

## 2023-10-27 RX ADMIN — FUROSEMIDE 40 MG: 40 TABLET ORAL at 10:12

## 2023-10-27 RX ADMIN — PANTOPRAZOLE SODIUM 40 MG: 40 TABLET, DELAYED RELEASE ORAL at 06:01

## 2023-10-27 RX ADMIN — INFLUENZA VIRUS VACCINE 0.5 ML: 15; 15; 15; 15 SUSPENSION INTRAMUSCULAR at 10:12

## 2023-10-27 RX ADMIN — CLOPIDOGREL BISULFATE 75 MG: 75 TABLET ORAL at 10:12

## 2023-10-27 RX ADMIN — METOCLOPRAMIDE 5 MG: 5 INJECTION, SOLUTION INTRAMUSCULAR; INTRAVENOUS at 16:42

## 2023-10-27 RX ADMIN — ASPIRIN 81 MG: 81 TABLET, COATED ORAL at 10:12

## 2023-10-27 RX ADMIN — TAMSULOSIN HYDROCHLORIDE 0.4 MG: 0.4 CAPSULE ORAL at 16:35

## 2023-10-27 RX ADMIN — ENOXAPARIN SODIUM 40 MG: 40 INJECTION SUBCUTANEOUS at 10:13

## 2023-10-27 RX ADMIN — ATORVASTATIN CALCIUM 80 MG: 80 TABLET, FILM COATED ORAL at 16:35

## 2023-10-27 RX ADMIN — FINASTERIDE 5 MG: 5 TABLET, FILM COATED ORAL at 10:12

## 2023-10-27 RX ADMIN — ALPRAZOLAM 0.25 MG: 0.25 TABLET ORAL at 22:47

## 2023-10-27 RX ADMIN — INSULIN LISPRO 1 UNITS: 100 INJECTION, SOLUTION INTRAVENOUS; SUBCUTANEOUS at 16:35

## 2023-10-27 RX ADMIN — FUROSEMIDE 40 MG: 40 TABLET ORAL at 16:35

## 2023-10-27 RX ADMIN — Medication 6 MG: at 22:47

## 2023-10-27 NOTE — ASSESSMENT & PLAN NOTE
57 yo male former smoker (quit 1994) with hx of DM II, CAD S/P CABG w/ R GSV, L BKA (by Dr. Claire Malagon 8/3/18 for tissue loss), PAD and chronic R heel ulcer presented to [facility] on 10/19/23 w/ weakness, chills, nausea and vomiting. Pt admitted w/ worsening R foot wound and proteus bacteremia, likely secondary to foot wound. Per podiatry, limb salvage is unlikely and pt will need amputation of RLE. Vascular surgery consulted for higher level amputation. Recommendation was above-knee amputation in setting of infection, severe lymphedema, skin changes, and existing contralateral amputation. Pt is S/P R AKA on 10/25 w/ uneventful post-op course. Diagnostics:   -10/22/23: Echo: LVEF 31% w/ normal systolic function and mildly abnormal diastolic dysfunction  -ISAK 10/20/23: R YUDITH: 0.95/7/-; Diffuse atherosclerotic disease, 50-75% prox SFA x 2; unable to visualize calf vessels due to edema, depth  -CT RLE 10/19/23: severe diffuse subcutaneous edema without subcutaneous gas; no OM. -Xray R foot 10/19/23: large ulcer overlying plantar aspect of calcaneous.     Plan:  -Nonsalvageable RLE secondary to infected heal wound, per podiatry  -S/P R AKA on 10/25  -Continue prevena vac to surgical incision; remove on 11/1  -Continue ACE wrap to R AKA for prevena vac protection  -Sepsis bacteremia: BC (+) proteus; rocephin and flagyl completed  -Continue ASA, plavix and lipitor  -Maintain good diabetes control  -Continue medical management per primary team  -Will discuss w/ Dr. Vernell Eaton

## 2023-10-27 NOTE — PROGRESS NOTES
Progress Note - Infectious Disease   [facility] 58 y.o. male MRN: [id number]  Unit/Bed#: E4 -01 Encounter: [id number]      IMPRESSION & RECOMMENDATIONS:   Impression/Recommendations:  1. Sepsis, POA. Tachycardia, tachypnea, leukocytosis. Secondary to bacteremia. Fortunately, patient is afebrile and hemodynamically stable. WBC count overall much improved. -Continue to observe off any more antibiotics.  -Follow temperatures and hemodynamics  -Follow CBC     2. Polymicrobial bacteremia. Proteus/Bacteroides. Secondary to #3. No other clear explanation. Prior wound culture from the foot did isolate Proteus, in addition to E. coli and anaerobes. Status post 7-day course of IV antibiotic completed 10/26.     -Continue to observe off any more antibiotics. 3.  Progressive right heel gangrene. Prior wound cultures 2023 with growth of E. coli, Proteus, Bacteroides. CT without subcutaneous gas or discrete abscess. Podiatry evaluation noted and foot has been deemed nonsalvageable. Patient is now status post right AKA on 10/25.     -No further antibiotic indicated for this.  -Vascular surgery follow-up ongoing. 4.  Diabetes mellitus type 2, with peripheral neuropathy and long-term insulin use. Risk factor for infection and poor wound healing. 5.  PAD post left BKA . 6.  Elevated troponin. Cardiology input noted with suspicion for demand ischemia in the setting of acute infection. Patient had prior CABG in 2018. Cardiology follow-up ongoing. Antibiotics:  Antibiotic 9  Off antibiotic 1  POD2     Please call us with any new questions over the weekend. Subjective:  Postop pain. No fevers or other new reported overnight events.     Objective:  Vitals:  Temp:  [96.7 °F (35.9 °C)-97.6 °F (36.4 °C)] 97.1 °F (36.2 °C)  HR:  [63-79] 69  Resp:  [16-18] 18  BP: ()/(50-69) 126/67  SpO2:  [92 %-98 %] 98 %  Temp (24hrs), Av.2 °F (36.2 °C), Min:96.7 °F (35.9 °C), Max:97.6 °F (36.4 °C)  Current: Temperature: (!) 97.1 °F (36.2 °C)    Physical Exam:   General:  No acute distress, nontoxic  ENT: Atraumatic normocephalic  Neck: Trachea midline  Psychiatric:  Awake and alert  Pulmonary:  Normal respiratory excursion without accessory muscle use  Abdomen:  Soft, nontender  Extremities:  AKA dressing intact with VAC in place  Skin:  No rashes or visible draining wounds  Neuro: Moves all extremities spontaneously    Lab Results:  I have personally reviewed pertinent labs. Results from last 7 days   Lab Units 10/26/23  0453 10/25/23  0533 10/23/23  0641   POTASSIUM mmol/L 3.9 3.6 3.6   CHLORIDE mmol/L 99 98 96   CO2 mmol/L 29 30 31   BUN mg/dL 16 18 28*   CREATININE mg/dL 1.27 0.87 0.86   EGFR ml/min/1.73sq m 59 91 91   CALCIUM mg/dL 7.7* 8.5 8.4     Results from last 7 days   Lab Units 10/27/23  0629 10/26/23  0453 10/25/23  0533   WBC Thousand/uL 11.37* 11.89* 9.91   HEMOGLOBIN g/dL 9.1* 9.9* 12.4   PLATELETS Thousands/uL 315 314 361           Imaging Studies:   I have personally reviewed pertinent imaging study reports and images in PACS. EKG, Pathology, and Other Studies:   I have personally reviewed pertinent reports.

## 2023-10-27 NOTE — CASE MANAGEMENT
Case Management Discharge Planning Note    Patient name Michael Hopson  Location [facility] [address] [facility] [place] 456/E4 MS 26-* MRN [id number]  : 1960 Date 10/27/2023       Current Admission Date: 10/19/2023  Current Admission Diagnosis:Sepsis [facility])   Patient Active Problem List    Diagnosis Date Noted    Anemia 10/26/2023    Gangrene ([address]) 10/23/2023    Sepsis ([address]) 10/20/2023    Bacteremia due to Proteus species 10/20/2023    Elevated troponin 10/19/2023    Loose stools 2023    Diabetic ulcer of right heel ([address]) 2023    Lymphedema in adult patient 2023    Cellulitis of right ankle 2023    Non-pressure chronic ulcer of right calf with fat layer exposed ([address]) 2023    Embolism and thrombosis of arteries of the lower extremities ([address]) 10/29/2020    Lymphedema of right lower extremity 10/29/2020    Venous stasis dermatitis of right lower extremity 10/29/2020    Abdominal distension 10/29/2020    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    Nodular rash 2020    CAD (coronary artery disease) 2019    Phantom limb syndrome without pain ([address]) 10/04/2018    Obstructive sleep apnea 2018    Hyponatremia 2018    Diabetic autonomic neuropathy associated with type 2 diabetes mellitus ([address]) 2018    S/P CABG x 3 2018    Chronic heart failure with preserved ejection fraction ([address]) 2018    Hypertensive heart disease with congestive heart failure ([address])     Triple vessel coronary artery disease 2018    Diabetic gastroparesis  2018    Class 2 severe obesity due to excess calories with serious comorbidity and body mass index (BMI) of 35.0 to 35.9 in adult  2018    Orthostatic hypotension 2018    PAD (peripheral artery disease) ([address]) 2018    S/P BKA (below knee amputation), left ([address]) 2018    Anxiety and depression 2017    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 2017    Vitamin D deficiency 09/20/2016    Hyperlipidemia 02/11/2015    Type 2 diabetes mellitus with diabetic neuropathy, with long-term current use of insulin ([address]) 11/06/2014      LOS (days): 8  Geometric Mean LOS (GMLOS) (days): 9.90  Days to GMLOS:2.2     OBJECTIVE:  Risk of Unplanned Readmission Score: 26.16         Current admission status: Inpatient   Preferred Pharmacy:   [facility] [address] RAZIA PEREIRA [address], [address]  [place] [address]  Phone: [phone] Fax: [phone]    [facility] ([address] [place] [address] [address]  [address] [facility] [place] [address]  Phone: [phone] Fax: [place] [facility], [address] [place] [address] [facility]  Phone: [phone] Fax: [phone]    Primary Care Provider: Yelena Celestin DO    Primary Insurance: Reymundo D[facility] REP  Secondary Insurance:     DISCHARGE DETAILS:                  Additional Comments: CM sent optioning paperwork to [facility] today and sent short term rehab referrals through [facility].

## 2023-10-27 NOTE — PLAN OF CARE
Problem: Potential for Falls  Goal: Patient will remain free of falls  Description: INTERVENTIONS:  - Educate patient/family on patient safety including physical limitations  - Instruct patient to call for assistance with activity   - Consult OT/PT to assist with strengthening/mobility   - Keep Call bell within reach  - Keep bed low and locked with side rails adjusted as appropriate  - Keep care items and personal belongings within reach  - Initiate and maintain comfort rounds  - Make Fall Risk Sign visible to staff  - Offer Toileting every 3 Hours, in advance of need  - Initiate/Maintain bed alarm  - Obtain necessary fall risk management equipment:   - Apply yellow socks and bracelet for high fall risk patients  - Consider moving patient to room near nurses station  Outcome: Progressing     Problem: MOBILITY - ADULT  Goal: Maintain or return to baseline ADL function  Description: INTERVENTIONS:  -  Assess patient's ability to carry out ADLs; assess patient's baseline for ADL function and identify physical deficits which impact ability to perform ADLs (bathing, care of mouth/teeth, toileting, grooming, dressing, etc.)  - Assess/evaluate cause of self-care deficits   - Assess range of motion  - Assess patient's mobility; develop plan if impaired  - Assess patient's need for assistive devices and provide as appropriate  - Encourage maximum independence but intervene and supervise when necessary  - Involve family in performance of ADLs  - Assess for home care needs following discharge   - Consider OT consult to assist with ADL evaluation and planning for discharge  - Provide patient education as appropriate  Outcome: Progressing  Goal: Maintains/Returns to pre admission functional level  Description: INTERVENTIONS:  - Perform BMAT or MOVE assessment daily.   - Set and communicate daily mobility goal to care team and patient/family/caregiver.    - Collaborate with rehabilitation services on mobility goals if consulted  - Perform Range of Motion 3 times a day. - Reposition patient every 2 hours.   - Dangle patient 3 times a day  - Stand patient 3 times a day  - Ambulate patient 3 times a day  - Out of bed to chair 3 times a day   - Out of bed for meals 3 times a day  - Out of bed for toileting  - Record patient progress and toleration of activity level   Outcome: Progressing     Problem: PAIN - ADULT  Goal: Verbalizes/displays adequate comfort level or baseline comfort level  Description: Interventions:  - Encourage patient to monitor pain and request assistance  - Assess pain using appropriate pain scale  - Administer analgesics based on type and severity of pain and evaluate response  - Implement non-pharmacological measures as appropriate and evaluate response  - Consider cultural and social influences on pain and pain management  - Notify physician/advanced practitioner if interventions unsuccessful or patient reports new pain  Outcome: Progressing     Problem: INFECTION - ADULT  Goal: Absence or prevention of progression during hospitalization  Description: INTERVENTIONS:  - Assess and monitor for signs and symptoms of infection  - Monitor lab/diagnostic results  - Monitor all insertion sites, i.e. indwelling lines, tubes, and drains  - Monitor endotracheal if appropriate and nasal secretions for changes in amount and color  - [place] appropriate cooling/warming therapies per order  - Administer medications as ordered  - Instruct and encourage patient and family to use good hand hygiene technique  - Identify and instruct in appropriate isolation precautions for identified infection/condition  Outcome: Progressing     Problem: SAFETY ADULT  Goal: Patient will remain free of falls  Description: INTERVENTIONS:  - Educate patient/family on patient safety including physical limitations  - Instruct patient to call for assistance with activity   - Consult OT/PT to assist with strengthening/mobility   - Keep Call bell within reach  - Keep bed low and locked with side rails adjusted as appropriate  - Keep care items and personal belongings within reach  - Initiate and maintain comfort rounds  - Make Fall Risk Sign visible to staff  - Offer Toileting every 3 Hours, in advance of need  - Initiate/Maintain bed alarm  - Obtain necessary fall risk management equipment:   - Apply yellow socks and bracelet for high fall risk patients  - Consider moving patient to room near nurses station  Outcome: Progressing  Goal: Maintain or return to baseline ADL function  Description: INTERVENTIONS:  -  Assess patient's ability to carry out ADLs; assess patient's baseline for ADL function and identify physical deficits which impact ability to perform ADLs (bathing, care of mouth/teeth, toileting, grooming, dressing, etc.)  - Assess/evaluate cause of self-care deficits   - Assess range of motion  - Assess patient's mobility; develop plan if impaired  - Assess patient's need for assistive devices and provide as appropriate  - Encourage maximum independence but intervene and supervise when necessary  - Involve family in performance of ADLs  - Assess for home care needs following discharge   - Consider OT consult to assist with ADL evaluation and planning for discharge  - Provide patient education as appropriate  Outcome: Progressing  Goal: Maintains/Returns to pre admission functional level  Description: INTERVENTIONS:  - Perform BMAT or MOVE assessment daily.   - Set and communicate daily mobility goal to care team and patient/family/caregiver. - Collaborate with rehabilitation services on mobility goals if consulted  - Perform Range of Motion 3 times a day. - Reposition patient every 2 hours.   - Dangle patient 3 times a day  - Stand patient 3 times a day  - Ambulate patient 3 times a day  - Out of bed to chair 3 times a day   - Out of bed for meals 3 times a day  - Out of bed for toileting  - Record patient progress and toleration of activity level   Outcome: Progressing     Problem: DISCHARGE PLANNING  Goal: Discharge to home or other facility with appropriate resources  Description: INTERVENTIONS:  - Identify barriers to discharge w/patient and caregiver  - Arrange for needed discharge resources and transportation as appropriate  - Identify discharge learning needs (meds, wound care, etc.)  - Arrange for interpretive services to assist at discharge as needed  - Refer to Case Management Department for coordinating discharge planning if the patient needs post-hospital services based on physician/advanced practitioner order or complex needs related to functional status, cognitive ability, or social support system  Outcome: Progressing     Problem: Knowledge Deficit  Goal: Patient/family/caregiver demonstrates understanding of disease process, treatment plan, medications, and discharge instructions  Description: Complete learning assessment and assess knowledge base.   Interventions:  - Provide teaching at level of understanding  - Provide teaching via preferred learning methods  Outcome: Progressing     Problem: Prexisting or High Potential for Compromised Skin Integrity  Goal: Skin integrity is maintained or improved  Description: INTERVENTIONS:  - Identify patients at risk for skin breakdown  - Assess and monitor skin integrity  - Assess and monitor nutrition and hydration status  - Monitor labs   - Assess for incontinence   - Turn and reposition patient  - Assist with mobility/ambulation  - Relieve pressure over bony prominences  - Avoid friction and shearing  - Provide appropriate hygiene as needed including keeping skin clean and dry  - Evaluate need for skin moisturizer/barrier cream  - Collaborate with interdisciplinary team   - Patient/family teaching  - Consider wound care consult   Outcome: Progressing     Problem: Nutrition/Hydration-ADULT  Goal: Nutrient/Hydration intake appropriate for improving, restoring or maintaining nutritional needs  Description: Monitor and assess patient's nutrition/hydration status for malnutrition. Collaborate with interdisciplinary team and initiate plan and interventions as ordered. Monitor patient's weight and dietary intake as ordered or per policy. Utilize nutrition screening tool and intervene as necessary. Determine patient's food preferences and provide high-protein, high-caloric foods as appropriate.      INTERVENTIONS:  - Monitor oral intake, urinary output, labs, and treatment plans  - Assess nutrition and hydration status and recommend course of action  - Evaluate amount of meals eaten  - Assist patient with eating if necessary   - Allow adequate time for meals  - Recommend/ encourage appropriate diets, oral nutritional supplements, and vitamin/mineral supplements  - Order, calculate, and assess calorie counts as needed  - Recommend, monitor, and adjust tube feedings and TPN/PPN based on assessed needs  - Assess need for intravenous fluids  - Provide specific nutrition/hydration education as appropriate  - Include patient/family/caregiver in decisions related to nutrition  Outcome: Progressing

## 2023-10-27 NOTE — PROGRESS NOTES
[facility]  Progress Note  Name: Peggy Gutierrez  MRN: [id number]  Unit/Bed#: E4 -01 I Date of Admission: 10/19/2023   Date of Service: 10/27/2023 I Hospital Day: 8    Assessment/Plan   PAD (peripheral artery disease) [facility])  Assessment & Plan  59 yo male former smoker (quit 1994) with hx of DM II, CAD S/P CABG w/ R GSV, L BKA (by Dr. Angely Fernandez 8/3/18 for tissue loss), PAD and chronic R heel ulcer presented to [facility] on 10/19/23 w/ weakness, chills, nausea and vomiting. Pt admitted w/ worsening R foot wound and proteus bacteremia, likely secondary to foot wound. Per podiatry, limb salvage is unlikely and pt will need amputation of RLE. Vascular surgery consulted for higher level amputation. Recommendation was above-knee amputation in setting of infection, severe lymphedema, skin changes, and existing contralateral amputation. Pt is S/P R AKA on 10/25 w/ uneventful post-op course. Diagnostics:   -10/22/23: Echo: LVEF 59% w/ normal systolic function and mildly abnormal diastolic dysfunction  -ISAK 10/20/23: R YUDITH: 0.95/7/-; Diffuse atherosclerotic disease, 50-75% prox SFA x 2; unable to visualize calf vessels due to edema, depth  -CT RLE 10/19/23: severe diffuse subcutaneous edema without subcutaneous gas; no OM. -Xray R foot 10/19/23: large ulcer overlying plantar aspect of calcaneous. Plan:  -Nonsalvageable RLE secondary to infected heal wound, per podiatry  -S/P R AKA on 10/25  -Continue prevena vac to surgical incision; remove on 11/1  -Continue ACE wrap to R AKA for prevena vac protection  -Sepsis bacteremia: BC (+) proteus; rocephin and flagyl completed  -Continue ASA, plavix and lipitor  -Maintain good diabetes control  -Continue medical management per primary team  -Will discuss w/ Dr. Luana Mayes      Subjective:  Pt seen for exam while sitting in his recliner chair; NAD. Pt reports sleep is improving now that he's allowed to sleep in a chair as he does at home.  Pt reports minimal pain in R AKA stump since amputation. Per documentation, he has used prn oxycodone once on 10/26/23. ACE wrap removed from R AKA for exam. Jerome Rater vac intact w/ good seal, trace area of dried blood on posterior aspect of vac sponge. No drainage in vac collection chamber. Stump rewrapped w/ ACE wrap. Pt denies any further complaints at this time. Vitals:  /69 (BP Location: Right arm)   Pulse 69   Temp (!) 97.3 °F (36.3 °C) (Temporal)   Resp 18   Ht 6' 1" (1.854 m)   Wt 103 kg (226 lb 3.1 oz)   SpO2 96%   BMI 29.84 kg/m²     I/Os:  I/O last 3 completed shifts: In: 61 [P.O.:60]  Out: 1775 [Urine:1775]  No intake/output data recorded.     Lab Results and Cultures:   Lab Results   Component Value Date    WBC 11.37 (H) 10/27/2023    HGB 9.1 (L) 10/27/2023    HCT 29.5 (L) 10/27/2023    MCV 86 10/27/2023     10/27/2023     Lab Results   Component Value Date    GLUCOSE 165 (H) 03/28/2018    CALCIUM 7.7 (L) 10/26/2023     09/13/2016    K 3.9 10/26/2023    CO2 29 10/26/2023    CL 99 10/26/2023    BUN 16 10/26/2023    CREATININE 1.27 10/26/2023     Lab Results   Component Value Date    INR 1.08 10/19/2023    INR 1.08 08/29/2023    INR 0.99 08/29/2021    PROTIME 14.0 10/19/2023    PROTIME 14.0 08/29/2023    PROTIME 12.9 08/29/2021     Blood Culture:   Lab Results   Component Value Date    BLOODCX Proteus mirabilis (A) 10/19/2023    BLOODCX Bacteroides thetaiotaomicron group (A) 10/19/2023     Urinalysis:   Lab Results   Component Value Date    COLORU Light Yellow 10/26/2023    CLARITYU Clear 10/26/2023    SPECGRAV 1.013 10/26/2023    PHUR 5.5 10/26/2023    PHUR 7.5 05/23/2018    LEUKOCYTESUR Negative 10/26/2023    NITRITE Negative 10/26/2023    GLUCOSEU Negative 10/26/2023    KETONESU 10 (1+) (A) 10/26/2023    BILIRUBINUR Negative 10/26/2023    BLOODU Trace (A) 10/26/2023         Medications:  Current Facility-Administered Medications   Medication Dose Route Frequency    acetaminophen (TYLENOL) tablet 650 mg  650 mg Oral Q6H PRN    ALPRAZolam (XANAX) tablet 0.25 mg  0.25 mg Oral BID PRN    aspirin (ECOTRIN LOW STRENGTH) EC tablet 81 mg  81 mg Oral Daily    atorvastatin (LIPITOR) tablet 80 mg  80 mg Oral Daily With Dinner    clopidogrel (PLAVIX) tablet 75 mg  75 mg Oral Daily    docusate sodium (COLACE) capsule 100 mg  100 mg Oral BID    enoxaparin (LOVENOX) subcutaneous injection 40 mg  40 mg Subcutaneous Daily    finasteride (PROSCAR) tablet 5 mg  5 mg Oral Daily    furosemide (LASIX) tablet 40 mg  40 mg Oral BID (diuretic)    influenza vaccine, quadrivalent (FLUARIX) IM injection 0.5 mL  0.5 mL Intramuscular Once    insulin lispro (HumaLOG) 100 units/mL subcutaneous injection 1-5 Units  1-5 Units Subcutaneous TID AC    magnesium hydroxide (MILK OF MAGNESIA) oral suspension 30 mL  30 mL Oral Daily PRN    melatonin tablet 6 mg  6 mg Oral HS    metoclopramide (REGLAN) injection 5 mg  5 mg Intravenous Q6H PRN    morphine injection 2 mg  2 mg Intravenous Q4H PRN    ondansetron (ZOFRAN) injection 4 mg  4 mg Intravenous Q4H PRN    oxyCODONE (ROXICODONE) immediate release tablet 10 mg  10 mg Oral Q4H PRN    oxyCODONE (ROXICODONE) IR tablet 5 mg  5 mg Oral Q4H PRN    pantoprazole (PROTONIX) EC tablet 40 mg  40 mg Oral Early Morning    senna (SENOKOT) tablet 8.6 mg  1 tablet Oral HS    tamsulosin (FLOMAX) capsule 0.4 mg  0.4 mg Oral Daily With Dinner       Imaging:  See above    Physical Exam:    General appearance: alert and oriented, in no acute distress  Skin: Skin color, texture, turgor normal. No rashes or lesions  Neurologic: Grossly normal  Head: Normocephalic, without obvious abnormality, atraumatic  Lungs: clear to auscultation bilaterally  Heart: regular rate and rhythm  Abdomen: soft, non-tender; bowel sounds normal; no masses,  no organomegaly  Extremities: extremities warm, well-perfused; L BKA, R AKA     Wound/Incision:  R AKA prevena vac intact w/ small area of dried blood on posterior aspect of vac sponge; good seal, vac functioning appropriately. Stump rewrapped w/ ACE wrap for prevena vac protection.     Pulse exam:  Radial: Right: 2+ Left[de-identified] 2+      CARLTON Wick  10/27/2023

## 2023-10-27 NOTE — ASSESSMENT & PLAN NOTE
Patient is a 53-year-old male with past medical history significant for diabetes, hypertension, hyperlipidemia, and peripheral arterial disease who presented to the ER with sepsis     he presented with sepsis, present on admission, with tachycardia, and leukocytosis   Sepsis secondary to bacteremia secondary to right heel wound   Patient was followed by the infectious disease team, and podiatry teams  AKA performed 10/25 with surgical cure  S/p  IV antibiotics:    Per ID:   pt has completed 7d course

## 2023-10-27 NOTE — ASSESSMENT & PLAN NOTE
Lab Results   Component Value Date    HGBA1C 6.7 (A) 02/02/2023       Recent Labs     10/26/23  2116 10/27/23  0715 10/27/23  1138 10/27/23  1609   POCGLU 175* 145* 131 152*     Patient with poor p.o. intake and borderline blood sugars  decreased regimen to avoid hypoglycemia  Home regimen is Lantus 40 units twice daily:   Patient with low-normal blood sugars despite Lantus being held :  due to poor po intake  Cont accu-Checks and sliding scale insulin  Anticipate will increase regimen once appetite returns

## 2023-10-27 NOTE — ASSESSMENT & PLAN NOTE
Was previously prescribed midodrine 2.5 mg 3 times daily however only takes this as needed  Monitor perioperatively:  bp low normal:  may require restarting midodrine depending on bp during PT

## 2023-10-27 NOTE — PROGRESS NOTES
Vascular Surgery  Progress Note   Marcelino Gregorio 58 y.o. male MRN: [id number]  Unit/Bed#: E4 -01 Encounter: [id number]    Assessment:  59 yo male former smoker (quit 1994) with hx of DM II, CAD S/P CABG w/ R GSV, L BKA (by Dr. Liam Rodríguez 8/3/18 for tissue loss), PAD and chronic R heel ulcer presented to [facility] on 10/19/23 w/ weakness, chills, nausea and vomiting. Pt admitted w/ worsening R foot wound and proteus bacteremia, likely secondary to foot wound. Per podiatry, limb salvage is unlikely and pt will need amputation of RLE. Vascular surgery consulted for higher level amputation. Recommendation was above-knee amputation in setting of infection, severe lymphedema, skin changes, and existing contralateral amputation. Pt is S/P R AKA on 10/25 w/ uneventful post-op course. AVSS on room air    WBC pending from 11. 37  Hgb pending from 9.1  Plts pending from 315  Cr pending from 1.27    Diagnostics:   -10/22/23: Echo: LVEF 33% w/ normal systolic function and mildly abnormal diastolic dysfunction  -ISAK 10/20/23: R YUDITH: 0.95/7/-; Diffuse atherosclerotic disease, 50-75% prox SFA x 2; unable to visualize calf vessels due to edema, depth  -CT RLE 10/19/23: severe diffuse subcutaneous edema without subcutaneous gas; no OM. -Xray R foot 10/19/23: large ulcer overlying plantar aspect of calcaneous. Plan:  -Continue prevena vac to surgical incision; remove on 11/1  -Continue ACE wrap to R AKA for prevena vac protection  -Sepsis bacteremia: BC (+) proteus; rocephin and flagyl completed  -Continue ASA, plavix and lipitor  -Maintain good diabetes control  -Continue medical management per primary team        Subjective/Objective     Subjective:   Patient seen and examined at bedside, in no acute distress. No acute events overnight. Patient denies having any pain in right AKA stump. Patient denies any numbness or tingling. Patient denies any fevers or chills.       Objective:   Vitals:Blood pressure 115/65, pulse 69, temperature 97.6 °F (36.4 °C), temperature source Temporal, resp. rate 18, height 6' 1" (1.854 m), weight 103 kg (226 lb 3.1 oz), SpO2 93 %. Temp (24hrs), Av.5 °F (36.4 °C), Min:96.8 °F (36 °C), Max:99 °F (37.2 °C)      I/O         10/26 0701  10/27 0700 10/27 0701  10/28 07    P. O.      I.V. (mL/kg)      IV Piggyback      Total Intake(mL/kg)      Urine (mL/kg/hr) 675 (0.3) 1050 (0.8)    Drains 0 0    Total Output 675 1050    Net -675 -1050                  Invasive Devices       Peripheral Intravenous Line  Duration             Peripheral IV 10/23/23 Dorsal (posterior); Proximal;Right Forearm 4 days    Peripheral IV 10/25/23 Left Hand 2 days                    Physical Exam:  General: No acute distress  Neuro: Awake, Alert and Oriented x 3  HEENT:  Normocephalic, atraumatic, moist mucous membranes  CV: Regular rate and rhythm  Lungs: Normal work of breathing, no increased respiratory effort  Abdomen: Soft, non-tender, non-distended. Incision(s) clean, dry and intact. Extremities: R AKA prevena vac intact w/ small area of dried blood on posterior aspect of vac sponge; good seal, vac functioning appropriately. Stump rewrapped w/ ACE wrap for prevena vac protection.    Skin: Warm, dry    Lab Results   Component Value Date    WBC 11.37 (H) 10/27/2023    HGB 9.1 (L) 10/27/2023    HCT 29.5 (L) 10/27/2023    MCV 86 10/27/2023     10/27/2023      Lab Results   Component Value Date     2016    SODIUM 136 10/26/2023    K 3.9 10/26/2023    CL 99 10/26/2023    CO2 29 10/26/2023    AGAP 8 10/26/2023    BUN 16 10/26/2023    CREATININE 1.27 10/26/2023    GLUC 115 10/26/2023    GLUF 139 (H) 2020    CALCIUM 7.7 (L) 10/26/2023    AST 15 10/20/2023    ALT 11 10/20/2023    ALKPHOS 52 10/20/2023    PROT 7.4 2016    TP 6.8 10/20/2023    BILITOT 0.4 2016    TBILI 0.57 10/20/2023    EGFR 59 10/26/2023       VTE Prophylaxis: Enoxaparin (Lovenox)        Liz Burr MD  10/28/2023  10:08 AM

## 2023-10-27 NOTE — PROGRESS NOTES
[facility]  Progress Note  Name: Morris Gay  MRN: [id number]  Unit/Bed#: E4 -01 I Date of Admission: 10/19/2023   Date of Service: 10/27/2023 I Hospital Day: 8    Assessment/Plan   Anemia  Assessment & Plan  Baseline Hb 11-12  Current Hb 9.1  Due to expected blood loss after AKA    Bacteremia due to Proteus species  Assessment & Plan  Patient presented with bacteremia with Proteus and Bacteroides   Secondary to right foot wound with gangrene  Completed course of antibiotics per ID, with 7d of ceftriaxone and Flagyl-    Elevated troponin  Assessment & Plan  Patient had an elevated troponin, secondary to non-MI troponin elevation secondary to sepsis   Patient was evaluated by the cardiology team  No evidence of acute ischemia  Continue home medication regimen with aspirin, Plavix, statin    S/P CABG x 3  Assessment & Plan  History of CAD status post CABG  Continue on aspirin, Plavix, statin  Elevated troponin on admission attributed to non-MI troponin elevation secondary to sepsis  He was evaluated by the cardiology team, no evidence of ischemia  Post-operatively pt was hemodynamically stable:  EKG with inf-lat TWI-  seen on prior EKGs in 4/23  No arrhythmias on tele  Cont medical management    Chronic heart failure with preserved ejection fraction [facility])  Assessment & Plan  Wt Readings from Last 3 Encounters:   10/26/23 103 kg (226 lb 3.1 oz)   09/02/23 118 kg (259 lb 7.7 oz)   07/31/23 118 kg (260 lb)     Patient has a history of chronic diastolic congestive heart failure  Most recent 2D echocardiogram 10/22: Left ventricular ejection fraction 61%. Grade 1 diastolic dysfunction.   Patient was placed on IV Lasix due to lower extremity edema  Lungs clear to auscultation  transitioned back to home Lasix 40 mg p.o. twice daily  Currently euvolemic post op    Diabetic gastroparesis   Assessment & Plan  patient with previous retching after admission:  resolved after trial of scheduled reglan  Possibly secondary to gastroparesis, versus intolerance to antibiotics especially Flagyl  Pt again had vomiting post op-  zofran not effective in past:  cont reglan- pt wishes it be ordered prn and not scheduled    Orthostatic hypotension  Assessment & Plan  Was previously prescribed midodrine 2.5 mg 3 times daily however only takes this as needed  Monitor perioperatively:  bp low normal:  may require restarting midodrine depending on bp during PT    S/P BKA (below knee amputation), left (HCC)  Assessment & Plan  With prosthesis in place    PAD (peripheral artery disease) ([address])  Assessment & Plan  Patient follows with vascular surgery outpatient  LEADS: "Diffuse atherosclerotic arterial disease noted, with a 50-75% stenosis in the  proximal superficial femoral artery and a 50-75% stenosis in the proximal superficial femoral artery"  vascular surgery performed AKA 10/25  Continue aspirin, Plavix, statin      Hyperlipidemia  Assessment & Plan  Continue Lipitor 80 mg daily    Type 2 diabetes mellitus with diabetic neuropathy, with long-term current use of insulin [facility])  Assessment & Plan  Lab Results   Component Value Date    HGBA1C 6.7 (A) 02/02/2023       Recent Labs     10/26/23  2116 10/27/23  0715 10/27/23  1138 10/27/23  1609   POCGLU 175* 145* 131 152*     Patient with poor p.o. intake and borderline blood sugars  decreased regimen to avoid hypoglycemia  Home regimen is Lantus 40 units twice daily:   Patient with low-normal blood sugars despite Lantus being held :  due to poor po intake  Cont accu-Checks and sliding scale insulin  Anticipate will increase regimen once appetite returns    * Sepsis [facility])  Assessment & Plan  Patient is a 69-year-old male with past medical history significant for diabetes, hypertension, hyperlipidemia, and peripheral arterial disease who presented to the ER with sepsis     he presented with sepsis, present on admission, with tachycardia, and leukocytosis   Sepsis secondary to bacteremia secondary to right heel wound   Patient was followed by the infectious disease team, and podiatry teams  AKA performed 10/25 with surgical cure  S/p  IV antibiotics:    Per ID:   pt has completed 7d course               VTE Pharmacologic Prophylaxis: VTE Score: 4 High Risk (Score >/= 5) - Pharmacological DVT Prophylaxis Ordered: enoxaparin (Lovenox). Sequential Compression Devices Ordered. Patient Centered Rounds: I performed bedside rounds with nursing staff today. Discussions with Specialists or Other Care Team Provider:     Education and Discussions with Family / Patient: Updated  (wife) via phone. Total Time Spent on Date of Encounter in care of patient: 35 mins. This time was spent on one or more of the following: performing physical exam; counseling and coordination of care; obtaining or reviewing history; documenting in the medical record; reviewing/ordering tests, medications or procedures; communicating with other healthcare professionals and discussing with patient's family/caregivers. Current Length of Stay: 8 day(s)  Current Patient Status: Inpatient   Certification Statement: The patient will continue to require additional inpatient hospital stay due to wound vac  Discharge Plan: Anticipate discharge in 48 hrs to rehab facility. Code Status: Level 1 - Full Code    Subjective:   Taiwo Veras was seen and examined before. He is concerned about his rehab options after the discharge. He also feels tired today. Objective:     Vitals:   Temp (24hrs), Av.1 °F (36.2 °C), Min:96.7 °F (35.9 °C), Max:97.6 °F (36.4 °C)    Temp:  [96.7 °F (35.9 °C)-97.6 °F (36.4 °C)] 96.8 °F (36 °C)  HR:  [63-79] 71  Resp:  [16-18] 18  BP: ()/(50-69) 115/56  SpO2:  [92 %-98 %] 98 %  Body mass index is 29.84 kg/m². Input and Output Summary (last 24 hours):      Intake/Output Summary (Last 24 hours) at 10/27/2023 8943  Last data filed at 10/27/2023 1000  Gross per 24 hour Intake --   Output 875 ml   Net -875 ml       Physical Exam:   Physical Exam  Vitals and nursing note reviewed. Constitutional:       General: He is not in acute distress. Appearance: He is well-developed. HENT:      Head: Normocephalic and atraumatic. Cardiovascular:      Rate and Rhythm: Normal rate and regular rhythm. Heart sounds: No murmur heard. Pulmonary:      Effort: Pulmonary effort is normal. No respiratory distress. Breath sounds: Normal breath sounds. Abdominal:      Palpations: Abdomen is soft. Tenderness: There is no abdominal tenderness. Musculoskeletal:         General: Deformity (Bilateral BKA) present. No swelling. Cervical back: Neck supple. Skin:     General: Skin is warm and dry. Neurological:      Mental Status: He is alert. Psychiatric:         Mood and Affect: Mood normal.          Additional Data:     Labs:  Results from last 7 days   Lab Units 10/27/23  0629   WBC Thousand/uL 11.37*   HEMOGLOBIN g/dL 9.1*   HEMATOCRIT % 29.5*   PLATELETS Thousands/uL 315   NEUTROS PCT % 79*   LYMPHS PCT % 14   MONOS PCT % 6   EOS PCT % 1     Results from last 7 days   Lab Units 10/26/23  0453   SODIUM mmol/L 136   POTASSIUM mmol/L 3.9   CHLORIDE mmol/L 99   CO2 mmol/L 29   BUN mg/dL 16   CREATININE mg/dL 1.27   ANION GAP mmol/L 8   CALCIUM mg/dL 7.7*   GLUCOSE RANDOM mg/dL 115         Results from last 7 days   Lab Units 10/27/23  1609 10/27/23  1138 10/27/23  0715 10/26/23  2116 10/26/23  1641 10/26/23  1158 10/26/23  0715 10/25/23  2036 10/25/23  1616 10/25/23  1345 10/25/23  0815 10/24/23  2100   POC GLUCOSE mg/dl 152* 131 145* 175* 166* 137 126 101 114 120 134 104               Lines/Drains:  Invasive Devices       Peripheral Intravenous Line  Duration             Peripheral IV 10/23/23 Dorsal (posterior); Proximal;Right Forearm 4 days    Peripheral IV 10/25/23 Left Hand 2 days                          Imaging: No pertinent imaging reviewed.     Recent Cultures (last 7 days):         Last 24 Hours Medication List:   Current Facility-Administered Medications   Medication Dose Route Frequency Provider Last Rate    acetaminophen  650 mg Oral Q6H PRN Josiane Borja MD      ALPRAZolam  0.25 mg Oral BID PRN Josiane Borja MD      aspirin  81 mg Oral Daily Joisane Borja MD      atorvastatin  80 mg Oral Daily With Jose Eduardo Mcgraw MD      clopidogrel  75 mg Oral Daily Josiane Borja MD      docusate sodium  100 mg Oral BID Josiane Borja MD      enoxaparin  40 mg Subcutaneous Daily Josiane Borja MD      finasteride  5 mg Oral Daily Josiane Borja MD      furosemide  40 mg Oral BID (diuretic) Josiane Borja MD      insulin lispro  1-5 Units Subcutaneous TID [facility] Josiane Borja MD      magnesium hydroxide  30 mL Oral Daily PRN Josiane Borja MD      melatonin  6 mg Oral HS Josiane Borja MD      metoclopramide  5 mg Intravenous Q6H PRN Cristopher Still MD      morphine injection  2 mg Intravenous Q4H PRN Cristopher Still MD      ondansetron  4 mg Intravenous Q4H PRN Cristopher Still MD      oxyCODONE  10 mg Oral Q4H PRN Cristopher Still MD      oxyCODONE  5 mg Oral Q4H PRN Cristopher Still MD      pantoprazole  40 mg Oral Early Morning Josiane Borja MD      senna  1 tablet Oral HS Josiane Borja MD      tamsulosin  0.4 mg Oral Daily With Jose Eduardo Mcgraw MD          Today, Patient Was Seen By: Ramiro Cohen MD    **Please Note: This note may have been constructed using a voice recognition system. **

## 2023-10-28 LAB
ABO GROUP BLD BPU: NORMAL
ABO GROUP BLD BPU: NORMAL
BPU ID: NORMAL
BPU ID: NORMAL
CROSSMATCH: NORMAL
CROSSMATCH: NORMAL
GLUCOSE SERPL-MCNC: 151 MG/DL (ref 65–140)
GLUCOSE SERPL-MCNC: 154 MG/DL (ref 65–140)
GLUCOSE SERPL-MCNC: 162 MG/DL (ref 65–140)
GLUCOSE SERPL-MCNC: 171 MG/DL (ref 65–140)
UNIT DISPENSE STATUS: NORMAL
UNIT DISPENSE STATUS: NORMAL
UNIT PRODUCT CODE: NORMAL
UNIT PRODUCT CODE: NORMAL
UNIT PRODUCT VOLUME: 350 ML
UNIT PRODUCT VOLUME: 350 ML
UNIT RH: NORMAL
UNIT RH: NORMAL

## 2023-10-28 PROCEDURE — 99232 SBSQ HOSP IP/OBS MODERATE 35: CPT | Performed by: FAMILY MEDICINE

## 2023-10-28 PROCEDURE — NC001 PR NO CHARGE: Performed by: SURGERY

## 2023-10-28 PROCEDURE — 82948 REAGENT STRIP/BLOOD GLUCOSE: CPT

## 2023-10-28 RX ADMIN — ENOXAPARIN SODIUM 40 MG: 40 INJECTION SUBCUTANEOUS at 08:03

## 2023-10-28 RX ADMIN — ATORVASTATIN CALCIUM 80 MG: 80 TABLET, FILM COATED ORAL at 16:47

## 2023-10-28 RX ADMIN — CLOPIDOGREL BISULFATE 75 MG: 75 TABLET ORAL at 08:03

## 2023-10-28 RX ADMIN — TAMSULOSIN HYDROCHLORIDE 0.4 MG: 0.4 CAPSULE ORAL at 16:47

## 2023-10-28 RX ADMIN — INSULIN LISPRO 1 UNITS: 100 INJECTION, SOLUTION INTRAVENOUS; SUBCUTANEOUS at 11:49

## 2023-10-28 RX ADMIN — FUROSEMIDE 40 MG: 40 TABLET ORAL at 16:47

## 2023-10-28 RX ADMIN — PANTOPRAZOLE SODIUM 40 MG: 40 TABLET, DELAYED RELEASE ORAL at 06:18

## 2023-10-28 RX ADMIN — INSULIN LISPRO 1 UNITS: 100 INJECTION, SOLUTION INTRAVENOUS; SUBCUTANEOUS at 16:47

## 2023-10-28 RX ADMIN — FUROSEMIDE 40 MG: 40 TABLET ORAL at 08:03

## 2023-10-28 RX ADMIN — FINASTERIDE 5 MG: 5 TABLET, FILM COATED ORAL at 08:03

## 2023-10-28 RX ADMIN — INSULIN LISPRO 1 UNITS: 100 INJECTION, SOLUTION INTRAVENOUS; SUBCUTANEOUS at 08:03

## 2023-10-28 RX ADMIN — ASPIRIN 81 MG: 81 TABLET, COATED ORAL at 08:03

## 2023-10-28 RX ADMIN — Medication 6 MG: at 21:41

## 2023-10-28 NOTE — ASSESSMENT & PLAN NOTE
Patient is a 25-year-old male with past medical history significant for diabetes, hypertension, hyperlipidemia, and peripheral arterial disease who presented to the ER with sepsis     he presented with sepsis, present on admission, with tachycardia, and leukocytosis   Sepsis secondary to bacteremia secondary to right heel wound   Patient was followed by the infectious disease team, and podiatry teams  AKA performed 10/25 with surgical cure  S/p  IV antibiotics:    Per ID:   pt has completed 7d course

## 2023-10-28 NOTE — ASSESSMENT & PLAN NOTE
Lab Results   Component Value Date    HGBA1C 6.7 (A) 02/02/2023       Recent Labs     10/27/23  1609 10/27/23  2105 10/28/23  0732 10/28/23  1057   POCGLU 152* 169* 154* 151*     Patient with poor p.o. intake and borderline blood sugars  decreased regimen to avoid hypoglycemia  Home regimen is Lantus 40 units twice daily:   Patient with close to normal blood sugars despite Lantus being held :  due to poor po intake  Cont accu-Checks and sliding scale insulin  Anticipate will increase regimen once appetite returns

## 2023-10-28 NOTE — CASE MANAGEMENT
Case Management Discharge Planning Note    Patient name Donna Rudd  Location [facility] [address] [facility] [place] 456/E4 MS 25-* MRN [id number]  : 1960 Date 10/28/2023       Current Admission Date: 10/19/2023  Current Admission Diagnosis:Sepsis [facility])   Patient Active Problem List    Diagnosis Date Noted    Anemia 10/26/2023    Gangrene ([address]) 10/23/2023    Sepsis ([address]) 10/20/2023    Bacteremia due to Proteus species 10/20/2023    Elevated troponin 10/19/2023    Loose stools 2023    Diabetic ulcer of right heel ([address]) 2023    Lymphedema in adult patient 2023    Cellulitis of right ankle 2023    Non-pressure chronic ulcer of right calf with fat layer exposed ([address]) 2023    Embolism and thrombosis of arteries of the lower extremities ([address]) 10/29/2020    Lymphedema of right lower extremity 10/29/2020    Venous stasis dermatitis of right lower extremity 10/29/2020    Abdominal distension 10/29/2020    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    Nodular rash 2020    CAD (coronary artery disease) 2019    Phantom limb syndrome without pain ([address]) 10/04/2018    Obstructive sleep apnea 2018    Hyponatremia 2018    Diabetic autonomic neuropathy associated with type 2 diabetes mellitus ([address]) 2018    S/P CABG x 3 2018    Chronic heart failure with preserved ejection fraction ([address]) 2018    Hypertensive heart disease with congestive heart failure ([address])     Triple vessel coronary artery disease 2018    Diabetic gastroparesis  2018    Class 2 severe obesity due to excess calories with serious comorbidity and body mass index (BMI) of 35.0 to 35.9 in adult  2018    Orthostatic hypotension 2018    PAD (peripheral artery disease) ([address]) 2018    S/P BKA (below knee amputation), left ([address]) 2018    Anxiety and depression 2017    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 2017    Vitamin D deficiency 09/20/2016    Hyperlipidemia 02/11/2015    Type 2 diabetes mellitus with diabetic neuropathy, with long-term current use of insulin ([address]) 11/06/2014      LOS (days): 9  Geometric Mean LOS (GMLOS) (days): 9.90  Days to GMLOS:0.9     OBJECTIVE:  Risk of Unplanned Readmission Score: 25.89         Current admission status: Inpatient   Preferred Pharmacy:   [facility] [address] RAZIA PEREIRA [address], [address]  [place] [address]  Phone: [phone] Fax: [phone]    [facility] ([address] [place] [address] [address]  [address] [place] [address]  Phone: [phone] Fax: [phone] [address] [facility], [address] [place] [address] [facility]  Phone: [phone] Fax: [phone]    Primary Care Provider: Jorge Enriquez DO    Primary Insurance: [facility]  Secondary Insurance:     DISCHARGE DETAILS:     CM spoke with patient about SNF options. He said that he wants to go to [facility] with the possibility of leading to LTC. He said that his choices are [facility] and [facility]. CM explained to patient that the [facility] does not accept [facility] for LTC. However, CM will put the referral in. CM also placed a referrals for [facility]. Both referrals were placed via [facility].

## 2023-10-28 NOTE — PROGRESS NOTES
[facility]  Progress Note  Name: Donna Rudd I  MRN: [id number]  Unit/Bed#: E4 -01 I Date of Admission: 10/19/2023   Date of Service: 10/28/2023 I Hospital Day: 9    Assessment/Plan   Gangrene [facility])  Assessment & Plan  Patient presented with gangrene of the right heel  Had gotten progressively worse since his previous admission 8/2023  Patient was evaluated by podiatry and vascular surgery: Foot deemed nonsalvageable  AKA performed by vascular surgery 10/25  First post op dressing change tomorrow  VAC in place until 10/30  Cont pain medications  Pt will need acute rehab as he is now a bilateral amputee    Bacteremia due to Proteus species  Assessment & Plan  Patient presented with bacteremia with Proteus and Bacteroides   Secondary to right foot wound with gangrene  Completed course of antibiotics per ID, with 7d of ceftriaxone and Flagyl-    S/P CABG x 3  Assessment & Plan  History of CAD status post CABG  Continue on aspirin, Plavix, statin  Elevated troponin on admission attributed to non-MI troponin elevation secondary to sepsis  He was evaluated by the cardiology team, no evidence of ischemia  Post-operatively pt was hemodynamically stable:  EKG with inf-lat TWI-  seen on prior EKGs in 4/23  No arrhythmias on tele  Cont medical management    Chronic heart failure with preserved ejection fraction [facility])  Assessment & Plan  Wt Readings from Last 3 Encounters:   10/26/23 103 kg (226 lb 3.1 oz)   09/02/23 118 kg (259 lb 7.7 oz)   07/31/23 118 kg (260 lb)     Patient has a history of chronic diastolic congestive heart failure  Most recent 2D echocardiogram 10/22: Left ventricular ejection fraction 61%. Grade 1 diastolic dysfunction.   Patient was placed on IV Lasix due to lower extremity edema  Lungs clear to auscultation  transitioned back to home Lasix 40 mg p.o. twice daily  Currently euvolemic post op    Diabetic gastroparesis   Assessment & Plan  patient with previous retching after admission:  resolved after trial of scheduled reglan  Possibly secondary to gastroparesis, versus intolerance to antibiotics especially Flagyl  Cont reglan- pt wishes it be ordered prn and not scheduled    Orthostatic hypotension  Assessment & Plan  Was previously prescribed midodrine 2.5 mg 3 times daily however only takes this as needed  Monitor perioperatively:  bp low normal:  may require restarting midodrine depending on bp during PT    S/P BKA (below knee amputation), left (HCC)  Assessment & Plan  With prosthesis in place    PAD (peripheral artery disease) ([address])  Assessment & Plan  Patient follows with vascular surgery outpatient  LEADS: "Diffuse atherosclerotic arterial disease noted, with a 50-75% stenosis in the  proximal superficial femoral artery and a 50-75% stenosis in the proximal superficial femoral artery"  vascular surgery performed AKA 10/25  Continue aspirin, Plavix, statin      Hyperlipidemia  Assessment & Plan  Continue Lipitor 80 mg daily    Type 2 diabetes mellitus with diabetic neuropathy, with long-term current use of insulin [facility])  Assessment & Plan  Lab Results   Component Value Date    HGBA1C 6.7 (A) 02/02/2023       Recent Labs     10/27/23  1609 10/27/23  2105 10/28/23  0732 10/28/23  1057   POCGLU 152* 169* 154* 151*     Patient with poor p.o. intake and borderline blood sugars  decreased regimen to avoid hypoglycemia  Home regimen is Lantus 40 units twice daily:   Patient with close to normal blood sugars despite Lantus being held :  due to poor po intake  Cont accu-Checks and sliding scale insulin  Anticipate will increase regimen once appetite returns    * Sepsis [facility])  Assessment & Plan  Patient is a 58-year-old male with past medical history significant for diabetes, hypertension, hyperlipidemia, and peripheral arterial disease who presented to the ER with sepsis     he presented with sepsis, present on admission, with tachycardia, and leukocytosis   Sepsis secondary to bacteremia secondary to right heel wound   Patient was followed by the infectious disease team, and podiatry teams  AKA performed 10/25 with surgical cure  S/p  IV antibiotics:    Per ID:   pt has completed 7d course               VTE Pharmacologic Prophylaxis: VTE Score: 4 High Risk (Score >/= 5) - Pharmacological DVT Prophylaxis Ordered: enoxaparin (Lovenox). Sequential Compression Devices Ordered. Patient Centered Rounds: I performed bedside rounds with nursing staff today. Discussions with Specialists or Other Care Team Provider:     Education and Discussions with Family / Patient: Updated  (wife) via phone. Total Time Spent on Date of Encounter in care of patient: 35 mins. This time was spent on one or more of the following: performing physical exam; counseling and coordination of care; obtaining or reviewing history; documenting in the medical record; reviewing/ordering tests, medications or procedures; communicating with other healthcare professionals and discussing with patient's family/caregivers. Current Length of Stay: 9 day(s)  Current Patient Status: Inpatient   Certification Statement: The patient will continue to require additional inpatient hospital stay due to placement and wound vac  Discharge Plan: Anticipate discharge in 48 hrs to rehab facility. Code Status: Level 1 - Full Code    Subjective:   Chase Ward was seen and examined today. He continues to be very nervous about his placement to the rehab. He also reports feeling tired. Objective:     Vitals:   Temp (24hrs), Av.6 °F (36.4 °C), Min:96.8 °F (36 °C), Max:99 °F (37.2 °C)    Temp:  [96.8 °F (36 °C)-99 °F (37.2 °C)] 97.6 °F (36.4 °C)  HR:  [67-73] 69  Resp:  [18] 18  BP: (107-134)/(55-65) 115/65  SpO2:  [93 %-98 %] 93 %  Body mass index is 29.84 kg/m². Input and Output Summary (last 24 hours):      Intake/Output Summary (Last 24 hours) at 10/28/2023 1444  Last data filed at 10/28/2023 0840  Gross per 24 hour   Intake --   Output 1050 ml   Net -1050 ml       Physical Exam:   Physical Exam  Vitals and nursing note reviewed. Constitutional:       General: He is not in acute distress. Appearance: He is well-developed. HENT:      Head: Normocephalic and atraumatic. Cardiovascular:      Rate and Rhythm: Normal rate and regular rhythm. Heart sounds: No murmur heard. Pulmonary:      Effort: Pulmonary effort is normal. No respiratory distress. Breath sounds: Normal breath sounds. Abdominal:      Palpations: Abdomen is soft. Tenderness: There is no abdominal tenderness. Musculoskeletal:         General: Deformity (Bilateral LE amputations) present. No swelling. Cervical back: Neck supple. Skin:     General: Skin is warm and dry. Neurological:      Mental Status: He is alert. Psychiatric:         Mood and Affect: Mood normal.          Additional Data:     Labs:  Results from last 7 days   Lab Units 10/27/23  0629   WBC Thousand/uL 11.37*   HEMOGLOBIN g/dL 9.1*   HEMATOCRIT % 29.5*   PLATELETS Thousands/uL 315   NEUTROS PCT % 79*   LYMPHS PCT % 14   MONOS PCT % 6   EOS PCT % 1     Results from last 7 days   Lab Units 10/26/23  0453   SODIUM mmol/L 136   POTASSIUM mmol/L 3.9   CHLORIDE mmol/L 99   CO2 mmol/L 29   BUN mg/dL 16   CREATININE mg/dL 1.27   ANION GAP mmol/L 8   CALCIUM mg/dL 7.7*   GLUCOSE RANDOM mg/dL 115         Results from last 7 days   Lab Units 10/28/23  1057 10/28/23  0732 10/27/23  2105 10/27/23  1609 10/27/23  1138 10/27/23  0715 10/26/23  2116 10/26/23  1641 10/26/23  1158 10/26/23  0715 10/25/23  2036 10/25/23  1616   POC GLUCOSE mg/dl 151* 154* 169* 152* 131 145* 175* 166* 137 126 101 114               Lines/Drains:  Invasive Devices       Peripheral Intravenous Line  Duration             Peripheral IV 10/23/23 Dorsal (posterior); Proximal;Right Forearm 4 days    Peripheral IV 10/25/23 Left Hand 3 days                          Imaging: No pertinent imaging reviewed. Recent Cultures (last 7 days):         Last 24 Hours Medication List:   Current Facility-Administered Medications   Medication Dose Route Frequency Provider Last Rate    acetaminophen  650 mg Oral Q6H PRN Harvey Allen MD      ALPRAZolam  0.25 mg Oral BID PRN Harvey Allen MD      aspirin  81 mg Oral Daily Harvey Allen MD      atorvastatin  80 mg Oral Daily With Karen Pickering MD      clopidogrel  75 mg Oral Daily Harvey Allen MD      docusate sodium  100 mg Oral BID Harvey Allen MD      enoxaparin  40 mg Subcutaneous Daily Harvey Allen MD      finasteride  5 mg Oral Daily Harvey Allen MD      furosemide  40 mg Oral BID (diuretic) Harvey Allen MD      insulin lispro  1-5 Units Subcutaneous TID [facility] Harvey Allen MD      magnesium hydroxide  30 mL Oral Daily PRN Harvey Allen MD      melatonin  6 mg Oral HS Harvey Allen MD      metoclopramide  5 mg Intravenous Q6H PRN Jennie Deleon MD      morphine injection  2 mg Intravenous Q4H PRN Jennie Deleon MD      ondansetron  4 mg Intravenous Q4H PRN Jennie Deleon MD      oxyCODONE  10 mg Oral Q4H PRN Jennie Deleon MD      oxyCODONE  5 mg Oral Q4H PRN Jennie Deleon MD      pantoprazole  40 mg Oral Early Morning Harvey Allen MD      senna  1 tablet Oral HS Harvey Allen MD      tamsulosin  0.4 mg Oral Daily With Karen Pickering MD          Today, Patient Was Seen By: Eros Shipman MD    **Please Note: This note may have been constructed using a voice recognition system. **

## 2023-10-28 NOTE — ARC ADMISSION
ARC continues to follow patient's case at this time, monitoring for medical stability and functional progress. Will review with [facility] physician as able and update CM accordingly.

## 2023-10-28 NOTE — PLAN OF CARE
Problem: Potential for Falls  Goal: Patient will remain free of falls  Description: INTERVENTIONS:  - Educate patient/family on patient safety including physical limitations  - Instruct patient to call for assistance with activity   - Consult OT/PT to assist with strengthening/mobility   - Keep Call bell within reach  - Keep bed low and locked with side rails adjusted as appropriate  - Keep care items and personal belongings within reach  - Initiate and maintain comfort rounds  - Make Fall Risk Sign visible to staff  - Offer Toileting every 3 Hours, in advance of need  - Initiate/Maintain bed alarm  - Obtain necessary fall risk management equipment:   - Apply yellow socks and bracelet for high fall risk patients  - Consider moving patient to room near nurses station  Outcome: Progressing     Problem: MOBILITY - ADULT  Goal: Maintain or return to baseline ADL function  Description: INTERVENTIONS:  -  Assess patient's ability to carry out ADLs; assess patient's baseline for ADL function and identify physical deficits which impact ability to perform ADLs (bathing, care of mouth/teeth, toileting, grooming, dressing, etc.)  - Assess/evaluate cause of self-care deficits   - Assess range of motion  - Assess patient's mobility; develop plan if impaired  - Assess patient's need for assistive devices and provide as appropriate  - Encourage maximum independence but intervene and supervise when necessary  - Involve family in performance of ADLs  - Assess for home care needs following discharge   - Consider OT consult to assist with ADL evaluation and planning for discharge  - Provide patient education as appropriate  Outcome: Progressing  Goal: Maintains/Returns to pre admission functional level  Description: INTERVENTIONS:  - Perform BMAT or MOVE assessment daily.   - Set and communicate daily mobility goal to care team and patient/family/caregiver.    - Collaborate with rehabilitation services on mobility goals if consulted  - Perform Range of Motion 3 times a day. - Reposition patient every 2 hours.   - Dangle patient 3 times a day  - Stand patient 3 times a day  - Ambulate patient 3 times a day  - Out of bed to chair 3 times a day   - Out of bed for meals 3 times a day  - Out of bed for toileting  - Record patient progress and toleration of activity level   Outcome: Progressing     Problem: PAIN - ADULT  Goal: Verbalizes/displays adequate comfort level or baseline comfort level  Description: Interventions:  - Encourage patient to monitor pain and request assistance  - Assess pain using appropriate pain scale  - Administer analgesics based on type and severity of pain and evaluate response  - Implement non-pharmacological measures as appropriate and evaluate response  - Consider cultural and social influences on pain and pain management  - Notify physician/advanced practitioner if interventions unsuccessful or patient reports new pain  Outcome: Progressing     Problem: INFECTION - ADULT  Goal: Absence or prevention of progression during hospitalization  Description: INTERVENTIONS:  - Assess and monitor for signs and symptoms of infection  - Monitor lab/diagnostic results  - Monitor all insertion sites, i.e. indwelling lines, tubes, and drains  - Monitor endotracheal if appropriate and nasal secretions for changes in amount and color  - [place] appropriate cooling/warming therapies per order  - Administer medications as ordered  - Instruct and encourage patient and family to use good hand hygiene technique  - Identify and instruct in appropriate isolation precautions for identified infection/condition  Outcome: Progressing     Problem: SAFETY ADULT  Goal: Patient will remain free of falls  Description: INTERVENTIONS:  - Educate patient/family on patient safety including physical limitations  - Instruct patient to call for assistance with activity   - Consult OT/PT to assist with strengthening/mobility   - Keep Call bell within reach  - Keep bed low and locked with side rails adjusted as appropriate  - Keep care items and personal belongings within reach  - Initiate and maintain comfort rounds  - Make Fall Risk Sign visible to staff  - Offer Toileting every 3 Hours, in advance of need  - Initiate/Maintain bed alarm  - Obtain necessary fall risk management equipment:   - Apply yellow socks and bracelet for high fall risk patients  - Consider moving patient to room near nurses station  Outcome: Progressing  Goal: Maintain or return to baseline ADL function  Description: INTERVENTIONS:  -  Assess patient's ability to carry out ADLs; assess patient's baseline for ADL function and identify physical deficits which impact ability to perform ADLs (bathing, care of mouth/teeth, toileting, grooming, dressing, etc.)  - Assess/evaluate cause of self-care deficits   - Assess range of motion  - Assess patient's mobility; develop plan if impaired  - Assess patient's need for assistive devices and provide as appropriate  - Encourage maximum independence but intervene and supervise when necessary  - Involve family in performance of ADLs  - Assess for home care needs following discharge   - Consider OT consult to assist with ADL evaluation and planning for discharge  - Provide patient education as appropriate  Outcome: Progressing  Goal: Maintains/Returns to pre admission functional level  Description: INTERVENTIONS:  - Perform BMAT or MOVE assessment daily.   - Set and communicate daily mobility goal to care team and patient/family/caregiver. - Collaborate with rehabilitation services on mobility goals if consulted  - Perform Range of Motion 3 times a day. - Reposition patient every 2 hours.   - Dangle patient 3 times a day  - Stand patient 3 times a day  - Ambulate patient 3 times a day  - Out of bed to chair 3 times a day   - Out of bed for meals 3 times a day  - Out of bed for toileting  - Record patient progress and toleration of activity level   Outcome: Progressing     Problem: DISCHARGE PLANNING  Goal: Discharge to home or other facility with appropriate resources  Description: INTERVENTIONS:  - Identify barriers to discharge w/patient and caregiver  - Arrange for needed discharge resources and transportation as appropriate  - Identify discharge learning needs (meds, wound care, etc.)  - Arrange for interpretive services to assist at discharge as needed  - Refer to Case Management Department for coordinating discharge planning if the patient needs post-hospital services based on physician/advanced practitioner order or complex needs related to functional status, cognitive ability, or social support system  Outcome: Progressing     Problem: Knowledge Deficit  Goal: Patient/family/caregiver demonstrates understanding of disease process, treatment plan, medications, and discharge instructions  Description: Complete learning assessment and assess knowledge base.   Interventions:  - Provide teaching at level of understanding  - Provide teaching via preferred learning methods  Outcome: Progressing     Problem: Prexisting or High Potential for Compromised Skin Integrity  Goal: Skin integrity is maintained or improved  Description: INTERVENTIONS:  - Identify patients at risk for skin breakdown  - Assess and monitor skin integrity  - Assess and monitor nutrition and hydration status  - Monitor labs   - Assess for incontinence   - Turn and reposition patient  - Assist with mobility/ambulation  - Relieve pressure over bony prominences  - Avoid friction and shearing  - Provide appropriate hygiene as needed including keeping skin clean and dry  - Evaluate need for skin moisturizer/barrier cream  - Collaborate with interdisciplinary team   - Patient/family teaching  - Consider wound care consult   Outcome: Progressing     Problem: Nutrition/Hydration-ADULT  Goal: Nutrient/Hydration intake appropriate for improving, restoring or maintaining nutritional needs  Description: Monitor and assess patient's nutrition/hydration status for malnutrition. Collaborate with interdisciplinary team and initiate plan and interventions as ordered. Monitor patient's weight and dietary intake as ordered or per policy. Utilize nutrition screening tool and intervene as necessary. Determine patient's food preferences and provide high-protein, high-caloric foods as appropriate.      INTERVENTIONS:  - Monitor oral intake, urinary output, labs, and treatment plans  - Assess nutrition and hydration status and recommend course of action  - Evaluate amount of meals eaten  - Assist patient with eating if necessary   - Allow adequate time for meals  - Recommend/ encourage appropriate diets, oral nutritional supplements, and vitamin/mineral supplements  - Order, calculate, and assess calorie counts as needed  - Recommend, monitor, and adjust tube feedings and TPN/PPN based on assessed needs  - Assess need for intravenous fluids  - Provide specific nutrition/hydration education as appropriate  - Include patient/family/caregiver in decisions related to nutrition  Outcome: Progressing

## 2023-10-28 NOTE — ASSESSMENT & PLAN NOTE
patient with previous retching after admission:  resolved after trial of scheduled reglan  Possibly secondary to gastroparesis, versus intolerance to antibiotics especially Flagyl  Jaquan reglan- pt wishes it be ordered prn and not scheduled

## 2023-10-29 LAB
ANION GAP SERPL CALCULATED.3IONS-SCNC: 5 MMOL/L
BASOPHILS # BLD AUTO: 0.05 THOUSANDS/ÂΜL (ref 0–0.1)
BASOPHILS NFR BLD AUTO: 1 % (ref 0–1)
BUN SERPL-MCNC: 14 MG/DL (ref 5–25)
CALCIUM SERPL-MCNC: 8.3 MG/DL (ref 8.4–10.2)
CHLORIDE SERPL-SCNC: 98 MMOL/L (ref 96–108)
CO2 SERPL-SCNC: 34 MMOL/L (ref 21–32)
CREAT SERPL-MCNC: 0.7 MG/DL (ref 0.6–1.3)
EOSINOPHIL # BLD AUTO: 0.14 THOUSAND/ÂΜL (ref 0–0.61)
EOSINOPHIL NFR BLD AUTO: 2 % (ref 0–6)
ERYTHROCYTE [DISTWIDTH] IN BLOOD BY AUTOMATED COUNT: 15.5 % (ref 11.6–15.1)
GFR SERPL CREATININE-BSD FRML MDRD: 99 ML/MIN/1.73SQ M
GLUCOSE SERPL-MCNC: 143 MG/DL (ref 65–140)
GLUCOSE SERPL-MCNC: 148 MG/DL (ref 65–140)
GLUCOSE SERPL-MCNC: 155 MG/DL (ref 65–140)
GLUCOSE SERPL-MCNC: 163 MG/DL (ref 65–140)
GLUCOSE SERPL-MCNC: 178 MG/DL (ref 65–140)
HCT VFR BLD AUTO: 29.8 % (ref 36.5–49.3)
HGB BLD-MCNC: 9.2 G/DL (ref 12–17)
IMM GRANULOCYTES # BLD AUTO: 0.03 THOUSAND/UL (ref 0–0.2)
IMM GRANULOCYTES NFR BLD AUTO: 0 % (ref 0–2)
LYMPHOCYTES # BLD AUTO: 1.73 THOUSANDS/ÂΜL (ref 0.6–4.47)
LYMPHOCYTES NFR BLD AUTO: 21 % (ref 14–44)
MCH RBC QN AUTO: 26.7 PG (ref 26.8–34.3)
MCHC RBC AUTO-ENTMCNC: 30.9 G/DL (ref 31.4–37.4)
MCV RBC AUTO: 87 FL (ref 82–98)
MONOCYTES # BLD AUTO: 0.62 THOUSAND/ÂΜL (ref 0.17–1.22)
MONOCYTES NFR BLD AUTO: 8 % (ref 4–12)
NEUTROPHILS # BLD AUTO: 5.68 THOUSANDS/ÂΜL (ref 1.85–7.62)
NEUTS SEG NFR BLD AUTO: 68 % (ref 43–75)
NRBC BLD AUTO-RTO: 0 /100 WBCS
PLATELET # BLD AUTO: 350 THOUSANDS/UL (ref 149–390)
PMV BLD AUTO: 9.1 FL (ref 8.9–12.7)
POTASSIUM SERPL-SCNC: 3.8 MMOL/L (ref 3.5–5.3)
RBC # BLD AUTO: 3.44 MILLION/UL (ref 3.88–5.62)
SODIUM SERPL-SCNC: 137 MMOL/L (ref 135–147)
WBC # BLD AUTO: 8.25 THOUSAND/UL (ref 4.31–10.16)

## 2023-10-29 PROCEDURE — 99232 SBSQ HOSP IP/OBS MODERATE 35: CPT | Performed by: FAMILY MEDICINE

## 2023-10-29 PROCEDURE — 85025 COMPLETE CBC W/AUTO DIFF WBC: CPT | Performed by: FAMILY MEDICINE

## 2023-10-29 PROCEDURE — 80048 BASIC METABOLIC PNL TOTAL CA: CPT | Performed by: FAMILY MEDICINE

## 2023-10-29 PROCEDURE — 82948 REAGENT STRIP/BLOOD GLUCOSE: CPT

## 2023-10-29 PROCEDURE — 97530 THERAPEUTIC ACTIVITIES: CPT

## 2023-10-29 PROCEDURE — 97535 SELF CARE MNGMENT TRAINING: CPT

## 2023-10-29 RX ADMIN — FUROSEMIDE 40 MG: 40 TABLET ORAL at 17:04

## 2023-10-29 RX ADMIN — INSULIN LISPRO 1 UNITS: 100 INJECTION, SOLUTION INTRAVENOUS; SUBCUTANEOUS at 09:14

## 2023-10-29 RX ADMIN — PANTOPRAZOLE SODIUM 40 MG: 40 TABLET, DELAYED RELEASE ORAL at 06:21

## 2023-10-29 RX ADMIN — ATORVASTATIN CALCIUM 80 MG: 80 TABLET, FILM COATED ORAL at 17:04

## 2023-10-29 RX ADMIN — INSULIN LISPRO 1 UNITS: 100 INJECTION, SOLUTION INTRAVENOUS; SUBCUTANEOUS at 12:33

## 2023-10-29 RX ADMIN — FINASTERIDE 5 MG: 5 TABLET, FILM COATED ORAL at 09:14

## 2023-10-29 RX ADMIN — Medication 6 MG: at 21:46

## 2023-10-29 RX ADMIN — ASPIRIN 81 MG: 81 TABLET, COATED ORAL at 09:14

## 2023-10-29 RX ADMIN — FUROSEMIDE 40 MG: 40 TABLET ORAL at 09:13

## 2023-10-29 RX ADMIN — CLOPIDOGREL BISULFATE 75 MG: 75 TABLET ORAL at 09:13

## 2023-10-29 RX ADMIN — ENOXAPARIN SODIUM 40 MG: 40 INJECTION SUBCUTANEOUS at 09:14

## 2023-10-29 RX ADMIN — TAMSULOSIN HYDROCHLORIDE 0.4 MG: 0.4 CAPSULE ORAL at 17:04

## 2023-10-29 NOTE — ASSESSMENT & PLAN NOTE
Patient is a 58-year-old male with past medical history significant for diabetes, hypertension, hyperlipidemia, and peripheral arterial disease who presented to the ER with sepsis     he presented with sepsis, present on admission, with tachycardia, and leukocytosis   Sepsis secondary to bacteremia secondary to right heel wound   Patient was followed by the infectious disease team, and podiatry teams  AKA performed 10/25 with surgical cure  S/p  IV antibiotics:    Per ID:   pt has completed 7d course

## 2023-10-29 NOTE — ASSESSMENT & PLAN NOTE
Lab Results   Component Value Date    HGBA1C 6.7 (A) 02/02/2023       Recent Labs     10/28/23  2054 10/29/23  0700 10/29/23  1148 10/29/23  1549   POCGLU 171* 155* 178* 148*     Patient with poor p.o. intake and borderline blood sugars  decreased regimen to avoid hypoglycemia  Home regimen is Lantus 40 units twice daily:   Patient with close to normal blood sugars despite Lantus being held :  due to poor po intake  Cont accu-Checks and sliding scale insulin  Anticipate will increase regimen once appetite returns

## 2023-10-29 NOTE — PLAN OF CARE
Problem: Potential for Falls  Goal: Patient will remain free of falls  Description: INTERVENTIONS:  - Educate patient/family on patient safety including physical limitations  - Instruct patient to call for assistance with activity   - Consult OT/PT to assist with strengthening/mobility   - Keep Call bell within reach  - Keep bed low and locked with side rails adjusted as appropriate  - Keep care items and personal belongings within reach  - Initiate and maintain comfort rounds  - Make Fall Risk Sign visible to staff  - Offer Toileting every  Hours, in advance of need  - Initiate/Maintain alarm  - Obtain necessary fall risk management equipment:   - Apply yellow socks and bracelet for high fall risk patients  - Consider moving patient to room near nurses station  Outcome: Progressing     Problem: MOBILITY - ADULT  Goal: Maintain or return to baseline ADL function  Description: INTERVENTIONS:  -  Assess patient's ability to carry out ADLs; assess patient's baseline for ADL function and identify physical deficits which impact ability to perform ADLs (bathing, care of mouth/teeth, toileting, grooming, dressing, etc.)  - Assess/evaluate cause of self-care deficits   - Assess range of motion  - Assess patient's mobility; develop plan if impaired  - Assess patient's need for assistive devices and provide as appropriate  - Encourage maximum independence but intervene and supervise when necessary  - Involve family in performance of ADLs  - Assess for home care needs following discharge   - Consider OT consult to assist with ADL evaluation and planning for discharge  - Provide patient education as appropriate  Outcome: Progressing  Goal: Maintains/Returns to pre admission functional level  Description: INTERVENTIONS:  - Perform BMAT or MOVE assessment daily.   - Set and communicate daily mobility goal to care team and patient/family/caregiver.    - Collaborate with rehabilitation services on mobility goals if consulted  - Perform Range of Motion times a day. - Reposition patient every  hours.   - Dangle patient  times a day  - Stand patient  times a day  - Ambulate patient  times a day  - Out of bed to chair times a day   - Out of bed for meals  times a day  - Out of bed for toileting  - Record patient progress and toleration of activity level   Outcome: Progressing     Problem: PAIN - ADULT  Goal: Verbalizes/displays adequate comfort level or baseline comfort level  Description: Interventions:  - Encourage patient to monitor pain and request assistance  - Assess pain using appropriate pain scale  - Administer analgesics based on type and severity of pain and evaluate response  - Implement non-pharmacological measures as appropriate and evaluate response  - Consider cultural and social influences on pain and pain management  - Notify physician/advanced practitioner if interventions unsuccessful or patient reports new pain  Outcome: Progressing     Problem: INFECTION - ADULT  Goal: Absence or prevention of progression during hospitalization  Description: INTERVENTIONS:  - Assess and monitor for signs and symptoms of infection  - Monitor lab/diagnostic results  - Monitor all insertion sites, i.e. indwelling lines, tubes, and drains  - Monitor endotracheal if appropriate and nasal secretions for changes in amount and color  - [place] appropriate cooling/warming therapies per order  - Administer medications as ordered  - Instruct and encourage patient and family to use good hand hygiene technique  - Identify and instruct in appropriate isolation precautions for identified infection/condition  Outcome: Progressing     Problem: SAFETY ADULT  Goal: Patient will remain free of falls  Description: INTERVENTIONS:  - Educate patient/family on patient safety including physical limitations  - Instruct patient to call for assistance with activity   - Consult OT/PT to assist with strengthening/mobility   - Keep Call bell within reach  - Keep bed low and locked with side rails adjusted as appropriate  - Keep care items and personal belongings within reach  - Initiate and maintain comfort rounds  - Make Fall Risk Sign visible to staff  - Offer Toileting every Hours, in advance of need  - Initiate/Maintain alarm  - Obtain necessary fall risk management equipment: - Apply yellow socks and bracelet for high fall risk patients  - Consider moving patient to room near nurses station  Outcome: Progressing  Goal: Maintain or return to baseline ADL function  Description: INTERVENTIONS:  -  Assess patient's ability to carry out ADLs; assess patient's baseline for ADL function and identify physical deficits which impact ability to perform ADLs (bathing, care of mouth/teeth, toileting, grooming, dressing, etc.)  - Assess/evaluate cause of self-care deficits   - Assess range of motion  - Assess patient's mobility; develop plan if impaired  - Assess patient's need for assistive devices and provide as appropriate  - Encourage maximum independence but intervene and supervise when necessary  - Involve family in performance of ADLs  - Assess for home care needs following discharge   - Consider OT consult to assist with ADL evaluation and planning for discharge  - Provide patient education as appropriate  Outcome: Progressing  Goal: Maintains/Returns to pre admission functional level  Description: INTERVENTIONS:  - Perform BMAT or MOVE assessment daily.   - Set and communicate daily mobility goal to care team and patient/family/caregiver. - Collaborate with rehabilitation services on mobility goals if consulted  - Perform Range of Motion  times a day. - Reposition patient every  hours.   - Dangle patient  times a day  - Stand patient  times a day  - Ambulate patient  times a day  - Out of bed to chair  times a day   - Out of bed for mealsimes a day  - Out of bed for toileting  - Record patient progress and toleration of activity level   Outcome: Progressing     Problem: DISCHARGE PLANNING  Goal: Discharge to home or other facility with appropriate resources  Description: INTERVENTIONS:  - Identify barriers to discharge w/patient and caregiver  - Arrange for needed discharge resources and transportation as appropriate  - Identify discharge learning needs (meds, wound care, etc.)  - Arrange for interpretive services to assist at discharge as needed  - Refer to Case Management Department for coordinating discharge planning if the patient needs post-hospital services based on physician/advanced practitioner order or complex needs related to functional status, cognitive ability, or social support system  Outcome: Progressing     Problem: Knowledge Deficit  Goal: Patient/family/caregiver demonstrates understanding of disease process, treatment plan, medications, and discharge instructions  Description: Complete learning assessment and assess knowledge base.   Interventions:  - Provide teaching at level of understanding  - Provide teaching via preferred learning methods  Outcome: Progressing     Problem: Prexisting or High Potential for Compromised Skin Integrity  Goal: Skin integrity is maintained or improved  Description: INTERVENTIONS:  - Identify patients at risk for skin breakdown  - Assess and monitor skin integrity  - Assess and monitor nutrition and hydration status  - Monitor labs   - Assess for incontinence   - Turn and reposition patient  - Assist with mobility/ambulation  - Relieve pressure over bony prominences  - Avoid friction and shearing  - Provide appropriate hygiene as needed including keeping skin clean and dry  - Evaluate need for skin moisturizer/barrier cream  - Collaborate with interdisciplinary team   - Patient/family teaching  - Consider wound care consult   Outcome: Progressing     Problem: Nutrition/Hydration-ADULT  Goal: Nutrient/Hydration intake appropriate for improving, restoring or maintaining nutritional needs  Description: Monitor and assess patient's nutrition/hydration status for malnutrition. Collaborate with interdisciplinary team and initiate plan and interventions as ordered. Monitor patient's weight and dietary intake as ordered or per policy. Utilize nutrition screening tool and intervene as necessary. Determine patient's food preferences and provide high-protein, high-caloric foods as appropriate.      INTERVENTIONS:  - Monitor oral intake, urinary output, labs, and treatment plans  - Assess nutrition and hydration status and recommend course of action  - Evaluate amount of meals eaten  - Assist patient with eating if necessary   - Allow adequate time for meals  - Recommend/ encourage appropriate diets, oral nutritional supplements, and vitamin/mineral supplements  - Order, calculate, and assess calorie counts as needed  - Recommend, monitor, and adjust tube feedings and TPN/PPN based on assessed needs  - Assess need for intravenous fluids  - Provide specific nutrition/hydration education as appropriate  - Include patient/family/caregiver in decisions related to nutrition  Outcome: Progressing

## 2023-10-29 NOTE — PHYSICAL THERAPY NOTE
PHYSICAL THERAPY NOTE          Patient Name: Angélica Alaniz  UHCHUCHO[facility]FAVIOLA Date: 10/29/2023       10/29/23 1321   PT Last Visit   PT Visit Date 10/29/23   Note Type   Note Type Treatment for insurance authorization   Pain Assessment   Pain Assessment Tool 0-10   Pain Score No Pain   Restrictions/Precautions   Weight Bearing Precautions Per Order Yes   RLE Weight Bearing Per Order NWB  (s/p R AKA)   Braces or Orthoses Other (Comment)  (LLE prosthesis)   Other Precautions Fall Risk   General   Chart Reviewed Yes   Additional Pertinent History Patient is s/p R AKA and has wound vac donned. Left prosthesis with poor fit for mobility. Patient wants rehab post hospitalization. Response to Previous Treatment Patient with no complaints from previous session. Family/Caregiver Present No   Cognition   Overall Cognitive Status WFL   Arousal/Participation Alert   Attention Attends with cues to redirect   Orientation Level Oriented X4   Memory Within functional limits   Following Commands Follows multistep commands without difficulty   Comments Patient identified with name and birthdate. Subjective   Subjective I want to be able to get a prosthesis for the right leg. Bed Mobility   Additional Comments Not assessed due to OOB in bedside chair. Transfers   Sit to Stand 3  Moderate assistance   Additional items Assist x 2; Increased time required;Verbal cues   Stand to Sit 4  Minimal assistance   Additional items Assist x 1;Verbal cues   Ambulation/Elevation   Gait Assistance 2  Maximal assist   Additional items Assist x 2;Other (Comment)  (Patient unable to reach full stand with RW and L BKA prosthesis donned.)   Assistive Device Rolling walker   Distance 0'   Stair Management Assistance Not tested   Balance   Static Sitting Normal   Dynamic Sitting Good   Static Standing Poor   Dynamic Standing Poor   Ambulatory Poor   Endurance Deficit   Endurance Deficit Yes   Endurance Deficit Description Resting /70 HR 60 SpO2 96% on RA; post session 126/84   Activity Tolerance   Activity Tolerance Patient tolerated treatment well   Medical Staff Made Aware Chirag:Pt seen for treatment with skilled Occupational Therapist 2* clinically unstable/unpredictable presentation, medical complexity, fall risk, functional/physical limitations, impaired functional balance, decreased safety awareness, limited activity tolerance which is decline from PLOF and may impact overall functional mobility/mobility safety. Nurse Made Aware Spoke with garrett for clearance for session. Exercises   Balance training  Transfer training completed with sit -stand transfer and verbal discussion of transfer options with and without prosthesis. Assessment   Prognosis Good   Problem List Decreased strength;Decreased endurance; Impaired balance;Decreased mobility; Impaired judgement;Pain;Decreased skin integrity;Orthopedic restrictions   Assessment Pt seen for PT treatment session, pt agreeable to PT treatment session upon arrival, pt found seated OOB in bedside chair. Patient reports he has been sleeping OOB in the chair due to an area around his sacrum. Sitting is a more comfortable position than laying. Patient was engaged and active in the session for mobility as well as donning prosthesis with OT. Patient actively problem solving his discharge options and voices the desire to be more independent with mobility to return home and eventually get a prosthesis for the right LE. At end of session, pt left seated OOB in bedside chair with all needs in reach. In comparison to previous session, pt with noted change in tolerance to activity. Pt  Showing good motivation and participation in PT. Pt   Continue to recommend IP rehab at time of d/c in order to maximize pt's functional independence and safety w/ mobility. Pt continues to be functioning below baseline level.  PT will continue to see pt while here in order to address the deficits listed above and provide interventions consistent w/ POC in effort to achieve STGs. The patient's AM-PAC Basic Mobility Inpatient Short Form Raw Score is 11. A raw score less than 16 suggests the patient may benefit from discharge to post-acute rehabilitation services. Please also refer to the recommendation of the Physical Therapist for safe discharge planning. Barriers to Discharge Inaccessible home environment;Decreased caregiver support   Goals   Patient Goals To go to rehab and get home. PT Treatment Day 2   Plan   Treatment/Interventions Functional transfer training;LE strengthening/ROM; Endurance training;Patient/family training;OT   Progress Improving as expected   PT Frequency 3-5x/wk   Discharge Recommendation   PT Discharge Recommendation Post acute rehabilitation services   Equipment Recommended Wheelchair   AM-PAC Basic Mobility Inpatient   Turning in Flat Bed Without Bedrails 3   Lying on Back to Sitting on Edge of Flat Bed Without Bedrails 2   Moving Bed to Chair 2   Standing Up From Chair Using Arms 2   Walk in Room 1   Climb 3-5 Stairs With Railing 1   Basic Mobility Inpatient Raw Score 11   Basic Mobility Standardized Score 30.25   Turning Head Towards Sound 4   Follow Simple Instructions 3   Low Function Basic Mobility Raw Score  18   Low Function Basic Mobility Standardized Score  29.25   Highest Level Of Mobility   JH-HLM Goal 4: Move to chair/commode   JH-HLM Achieved 4: Move to chair/commode   Education   Education Provided Mobility training   Patient Demonstrates acceptance/verbal understanding;Demonstrates verbal understanding   End of Consult   Patient Position at End of Consult Bedside chair; All needs within reach   [facility] PT, DPT, G[facility]

## 2023-10-29 NOTE — PLAN OF CARE
Problem: Potential for Falls  Goal: Patient will remain free of falls  Description: INTERVENTIONS:  - Educate patient/family on patient safety including physical limitations  - Instruct patient to call for assistance with activity   - Consult OT/PT to assist with strengthening/mobility   - Keep Call bell within reach  - Keep bed low and locked with side rails adjusted as appropriate  - Keep care items and personal belongings within reach  - Initiate and maintain comfort rounds  - Make Fall Risk Sign visible to staff  - Offer Toileting every 2 Hours, in advance of need  - Initiate/Maintain bed alarm  - Obtain necessary fall risk management equipment: alarm   - Apply yellow socks and bracelet for high fall risk patients  - Consider moving patient to room near nurses station  Outcome: Progressing     Problem: MOBILITY - ADULT  Goal: Maintain or return to baseline ADL function  Description: INTERVENTIONS:  -  Assess patient's ability to carry out ADLs; assess patient's baseline for ADL function and identify physical deficits which impact ability to perform ADLs (bathing, care of mouth/teeth, toileting, grooming, dressing, etc.)  - Assess/evaluate cause of self-care deficits   - Assess range of motion  - Assess patient's mobility; develop plan if impaired  - Assess patient's need for assistive devices and provide as appropriate  - Encourage maximum independence but intervene and supervise when necessary  - Involve family in performance of ADLs  - Assess for home care needs following discharge   - Consider OT consult to assist with ADL evaluation and planning for discharge  - Provide patient education as appropriate  Outcome: Progressing  Goal: Maintains/Returns to pre admission functional level  Description: INTERVENTIONS:  - Perform BMAT or MOVE assessment daily.   - Set and communicate daily mobility goal to care team and patient/family/caregiver.    - Collaborate with rehabilitation services on mobility goals if consulted  - Perform Range of Motion 3 times a day. - Reposition patient every 2 hours.   - Dangle patient 3 times a day  - Stand patient 3 times a day  - Ambulate patient 3 times a day  - Out of bed to chair 3 times a day   - Out of bed for meals 3 times a day  - Out of bed for toileting  - Record patient progress and toleration of activity level   Outcome: Progressing     Problem: PAIN - ADULT  Goal: Verbalizes/displays adequate comfort level or baseline comfort level  Description: Interventions:  - Encourage patient to monitor pain and request assistance  - Assess pain using appropriate pain scale  - Administer analgesics based on type and severity of pain and evaluate response  - Implement non-pharmacological measures as appropriate and evaluate response  - Consider cultural and social influences on pain and pain management  - Notify physician/advanced practitioner if interventions unsuccessful or patient reports new pain  Outcome: Progressing     Problem: INFECTION - ADULT  Goal: Absence or prevention of progression during hospitalization  Description: INTERVENTIONS:  - Assess and monitor for signs and symptoms of infection  - Monitor lab/diagnostic results  - Monitor all insertion sites, i.e. indwelling lines, tubes, and drains  - Monitor endotracheal if appropriate and nasal secretions for changes in amount and color  - [place] appropriate cooling/warming therapies per order  - Administer medications as ordered  - Instruct and encourage patient and family to use good hand hygiene technique  - Identify and instruct in appropriate isolation precautions for identified infection/condition  Outcome: Progressing     Problem: SAFETY ADULT  Goal: Patient will remain free of falls  Description: INTERVENTIONS:  - Educate patient/family on patient safety including physical limitations  - Instruct patient to call for assistance with activity   - Consult OT/PT to assist with strengthening/mobility   - Keep Call bell within reach  - Keep bed low and locked with side rails adjusted as appropriate  - Keep care items and personal belongings within reach  - Initiate and maintain comfort rounds  - Make Fall Risk Sign visible to staff  - Offer Toileting every 2 Hours, in advance of need  - Initiate/Maintain bed alarm  - Obtain necessary fall risk management equipment:alarm   - Apply yellow socks and bracelet for high fall risk patients  - Consider moving patient to room near nurses station  Outcome: Progressing  Goal: Maintain or return to baseline ADL function  Description: INTERVENTIONS:  -  Assess patient's ability to carry out ADLs; assess patient's baseline for ADL function and identify physical deficits which impact ability to perform ADLs (bathing, care of mouth/teeth, toileting, grooming, dressing, etc.)  - Assess/evaluate cause of self-care deficits   - Assess range of motion  - Assess patient's mobility; develop plan if impaired  - Assess patient's need for assistive devices and provide as appropriate  - Encourage maximum independence but intervene and supervise when necessary  - Involve family in performance of ADLs  - Assess for home care needs following discharge   - Consider OT consult to assist with ADL evaluation and planning for discharge  - Provide patient education as appropriate  Outcome: Progressing  Goal: Maintains/Returns to pre admission functional level  Description: INTERVENTIONS:  - Perform BMAT or MOVE assessment daily.   - Set and communicate daily mobility goal to care team and patient/family/caregiver. - Collaborate with rehabilitation services on mobility goals if consulted  - Perform Range of Motion 3 times a day. - Reposition patient every 2 hours.   - Dangle patient 3 times a day  - Stand patient 3 times a day  - Ambulate patient 3 times a day  - Out of bed to chair 3 times a day   - Out of bed for meals 3 times a day  - Out of bed for toileting  - Record patient progress and toleration of activity level   Outcome: Progressing     Problem: DISCHARGE PLANNING  Goal: Discharge to home or other facility with appropriate resources  Description: INTERVENTIONS:  - Identify barriers to discharge w/patient and caregiver  - Arrange for needed discharge resources and transportation as appropriate  - Identify discharge learning needs (meds, wound care, etc.)  - Arrange for interpretive services to assist at discharge as needed  - Refer to Case Management Department for coordinating discharge planning if the patient needs post-hospital services based on physician/advanced practitioner order or complex needs related to functional status, cognitive ability, or social support system  Outcome: Progressing     Problem: Knowledge Deficit  Goal: Patient/family/caregiver demonstrates understanding of disease process, treatment plan, medications, and discharge instructions  Description: Complete learning assessment and assess knowledge base.   Interventions:  - Provide teaching at level of understanding  - Provide teaching via preferred learning methods  Outcome: Progressing     Problem: Prexisting or High Potential for Compromised Skin Integrity  Goal: Skin integrity is maintained or improved  Description: INTERVENTIONS:  - Identify patients at risk for skin breakdown  - Assess and monitor skin integrity  - Assess and monitor nutrition and hydration status  - Monitor labs   - Assess for incontinence   - Turn and reposition patient  - Assist with mobility/ambulation  - Relieve pressure over bony prominences  - Avoid friction and shearing  - Provide appropriate hygiene as needed including keeping skin clean and dry  - Evaluate need for skin moisturizer/barrier cream  - Collaborate with interdisciplinary team   - Patient/family teaching  - Consider wound care consult   Outcome: Progressing     Problem: Nutrition/Hydration-ADULT  Goal: Nutrient/Hydration intake appropriate for improving, restoring or maintaining nutritional needs  Description: Monitor and assess patient's nutrition/hydration status for malnutrition. Collaborate with interdisciplinary team and initiate plan and interventions as ordered. Monitor patient's weight and dietary intake as ordered or per policy. Utilize nutrition screening tool and intervene as necessary. Determine patient's food preferences and provide high-protein, high-caloric foods as appropriate.      INTERVENTIONS:  - Monitor oral intake, urinary output, labs, and treatment plans  - Assess nutrition and hydration status and recommend course of action  - Evaluate amount of meals eaten  - Assist patient with eating if necessary   - Allow adequate time for meals  - Recommend/ encourage appropriate diets, oral nutritional supplements, and vitamin/mineral supplements  - Order, calculate, and assess calorie counts as needed  - Recommend, monitor, and adjust tube feedings and TPN/PPN based on assessed needs  - Assess need for intravenous fluids  - Provide specific nutrition/hydration education as appropriate  - Include patient/family/caregiver in decisions related to nutrition  Outcome: Progressing

## 2023-10-29 NOTE — PLAN OF CARE
Problem: OCCUPATIONAL THERAPY ADULT  Goal: Performs self-care activities at highest level of function for planned discharge setting. See evaluation for individualized goals. Description: Treatment Interventions: ADL retraining, Functional transfer training, UE strengthening/ROM, Endurance training, Patient/family training, Equipment evaluation/education, Compensatory technique education, Continued evaluation, Activityengagement          See flowsheet documentation for full assessment, interventions and recommendations. Note: Limitation: Decreased ADL status, Decreased UE strength, Decreased endurance, Decreased sensation, Decreased self-care trans, Decreased high-level ADLs  Prognosis: Good  Assessment: Pt seen for 44min tx session with focus on functional balance, functional mobility, ADL status, transfer safety, cognition, and education. Pt able to tolerate OOB mobility; sitting balance=f+/f, standing balance=p-. Pt attempted to stand with heavy assistance, but unable to place b/l hands on RW to maintain support; limited confidence with support of L LE prosthesis. Pt able to demonstrate good b/l UE AROM and strength. Pt demonstrating need for assistance with his LE ADLs. Pt able to demonstrate good cognition(i.e.orientation, memory). Reviewed alternative methods of transfers(i.e.sliding board, sit/stand pivot) and pressure relief techniques. Pt continues to demonstrate appropriateness for inpt rehab to improve his overall level of independence. Pt pleasant and cooperative with tx session. Will continue. The patient's raw score on the AM-PAC Daily Activity Inpatient Short Form is 17. A raw score of less than 19 suggests the patient may benefit from discharge to post-acute rehabilitation services. Please refer to the recommendation of the Occupational Therapist for safe discharge planning.      OT Discharge Recommendation: Post acute rehabilitation services

## 2023-10-29 NOTE — PLAN OF CARE
Problem: PHYSICAL THERAPY ADULT  Goal: Performs mobility at highest level of function for planned discharge setting. See evaluation for individualized goals. Description: Treatment/Interventions: Functional transfer training, LE strengthening/ROM, Therapeutic exercise, Endurance training, Patient/family training, Bed mobility, Gait training, Spoke to nursing, OT  Equipment Recommended: Wheelchair (pt has)       See flowsheet documentation for full assessment, interventions and recommendations. Note: Prognosis: Good  Problem List: Decreased strength, Decreased endurance, Impaired balance, Decreased mobility, Impaired judgement, Pain, Decreased skin integrity, Orthopedic restrictions  Assessment: Pt seen for PT treatment session, pt agreeable to PT treatment session upon arrival, pt found seated OOB in bedside chair. Patient reports he has been sleeping OOB in the chair due to an area around his sacrum. Sitting is a more comfortable position than laying. Patient was engaged and active in the session for mobility as well as donning prosthesis with OT. Patient actively problem solving his discharge options and voices the desire to be more independent with mobility to return home and eventually get a prosthesis for the right LE. At end of session, pt left seated OOB in bedside chair with all needs in reach. In comparison to previous session, pt with noted change in tolerance to activity. Pt  Showing good motivation and participation in PT. Pt   Continue to recommend IP rehab at time of d/c in order to maximize pt's functional independence and safety w/ mobility. Pt continues to be functioning below baseline level. PT will continue to see pt while here in order to address the deficits listed above and provide interventions consistent w/ POC in effort to achieve STGs. The patient's AM-PAC Basic Mobility Inpatient Short Form Raw Score is 11.  A raw score less than 16 suggests the patient may benefit from discharge to post-acute rehabilitation services. Please also refer to the recommendation of the Physical Therapist for safe discharge planning. Barriers to Discharge: Inaccessible home environment, Decreased caregiver support     PT Discharge Recommendation: Post acute rehabilitation services    See flowsheet documentation for full assessment.

## 2023-10-29 NOTE — PROGRESS NOTES
[address] [facility]  Progress Note  Name: Edis Keith I  MRN: [id number]  Unit/Bed#: E4 -01 I Date of Admission: 10/19/2023   Date of Service: 10/29/2023 I Hospital Day: 10    Assessment/Plan   Anemia  Assessment & Plan  Baseline Hb 11-12  Current Hb 9.1  Due to expected blood loss after AKA    Gangrene [facility])  Assessment & Plan  Patient presented with gangrene of the right heel  Had gotten progressively worse since his previous admission 8/2023  Patient was evaluated by podiatry and vascular surgery:  Foot deemed nonsalvageable  AKA performed by vascular surgery 10/25  First post op dressing change tomorrow  VAC in place until 10/30  Cont pain medications  Pt will need acute rehab as he is now a bilateral amputee    Bacteremia due to Proteus species  Assessment & Plan  Patient presented with bacteremia with Proteus and Bacteroides   Secondary to right foot wound with gangrene  Completed course of antibiotics per ID, with 7d of ceftriaxone and Flagyl-    Elevated troponin  Assessment & Plan  Patient had an elevated troponin, secondary to non-MI troponin elevation secondary to sepsis   Patient was evaluated by the cardiology team  No evidence of acute ischemia  Continue home medication regimen with aspirin, Plavix, statin    S/P CABG x 3  Assessment & Plan  History of CAD status post CABG  Continue on aspirin, Plavix, statin  Elevated troponin on admission attributed to non-MI troponin elevation secondary to sepsis  He was evaluated by the cardiology team, no evidence of ischemia  Post-operatively pt was hemodynamically stable:  EKG with inf-lat TWI-  seen on prior EKGs in 4/23  No arrhythmias on tele  Cont medical management    Chronic heart failure with preserved ejection fraction [facility])  Assessment & Plan  Wt Readings from Last 3 Encounters:   10/26/23 103 kg (226 lb 3.1 oz)   09/02/23 118 kg (259 lb 7.7 oz)   07/31/23 118 kg (260 lb)     Patient has a history of chronic diastolic congestive heart failure  Most recent 2D echocardiogram 10/22: Left ventricular ejection fraction 61%. Grade 1 diastolic dysfunction.   Patient was placed on IV Lasix due to lower extremity edema  Lungs clear to auscultation  transitioned back to home Lasix 40 mg p.o. twice daily  Currently euvolemic post op    Diabetic gastroparesis   Assessment & Plan  patient with previous retching after admission:  resolved after trial of scheduled reglan  Possibly secondary to gastroparesis, versus intolerance to antibiotics especially Flagyl  Cont reglan- pt wishes it be ordered prn and not scheduled    S/P BKA (below knee amputation), left ([address])  Assessment & Plan  With prosthesis in place    PAD (peripheral artery disease) ([address])  Assessment & Plan  Patient follows with vascular surgery outpatient  LEADS: "Diffuse atherosclerotic arterial disease noted, with a 50-75% stenosis in the  proximal superficial femoral artery and a 50-75% stenosis in the proximal superficial femoral artery"  vascular surgery performed AKA 10/25  Continue aspirin, Plavix, statin      Hyperlipidemia  Assessment & Plan  Continue Lipitor 80 mg daily    Type 2 diabetes mellitus with diabetic neuropathy, with long-term current use of insulin [facility])  Assessment & Plan  Lab Results   Component Value Date    HGBA1C 6.7 (A) 02/02/2023       Recent Labs     10/28/23  2054 10/29/23  0700 10/29/23  1148 10/29/23  1549   POCGLU 171* 155* 178* 148*     Patient with poor p.o. intake and borderline blood sugars  decreased regimen to avoid hypoglycemia  Home regimen is Lantus 40 units twice daily:   Patient with close to normal blood sugars despite Lantus being held :  due to poor po intake  Cont accu-Checks and sliding scale insulin  Anticipate will increase regimen once appetite returns    * Sepsis [facility])  Assessment & Plan  Patient is a 59-year-old male with past medical history significant for diabetes, hypertension, hyperlipidemia, and peripheral arterial disease who presented to the ER with sepsis     he presented with sepsis, present on admission, with tachycardia, and leukocytosis   Sepsis secondary to bacteremia secondary to right heel wound   Patient was followed by the infectious disease team, and podiatry teams  AKA performed 10/25 with surgical cure  S/p  IV antibiotics:    Per ID:   pt has completed 7d course               VTE Pharmacologic Prophylaxis: VTE Score: 4 High Risk (Score >/= 5) - Pharmacological DVT Prophylaxis Ordered: enoxaparin (Lovenox). Sequential Compression Devices Ordered. Patient Centered Rounds: I performed bedside rounds with nursing staff today. Discussions with Specialists or Other Care Team Provider: Case Management    Education and Discussions with Family / Patient: Ongoing family updates    Total Time Spent on Date of Encounter in care of patient: 35 mins. This time was spent on one or more of the following: performing physical exam; counseling and coordination of care; obtaining or reviewing history; documenting in the medical record; reviewing/ordering tests, medications or procedures; communicating with other healthcare professionals and discussing with patient's family/caregivers. Current Length of Stay: 10 day(s)  Current Patient Status: Inpatient   Certification Statement: The patient will continue to require additional inpatient hospital stay due to placement  Discharge Plan: Anticipate discharge in 24-48 hrs to rehab facility. Code Status: Level 1 - Full Code    Subjective:   Ab Patterson was seen and examined today. Continues to worry about his posthospitalization placement. Otherwise, he feels good    Objective:     Vitals:   Temp (24hrs), Av.8 °F (36.6 °C), Min:97.1 °F (36.2 °C), Max:98.2 °F (36.8 °C)    Temp:  [97.1 °F (36.2 °C)-98.2 °F (36.8 °C)] 97.1 °F (36.2 °C)  HR:  [60-67] 67  Resp:  [18] 18  BP: (122-131)/(57-70) 131/61  SpO2:  [96 %-97 %] 96 %  Body mass index is 29.84 kg/m².      Input and Output Summary (last 24 hours): Intake/Output Summary (Last 24 hours) at 10/29/2023 1705  Last data filed at 10/29/2023 0339  Gross per 24 hour   Intake 480 ml   Output 325 ml   Net 155 ml       Physical Exam:   hysical Exam  Vitals and nursing note reviewed. Constitutional:       General: He is not in acute distress. Appearance: He is well-developed. HENT:      Head: Normocephalic and atraumatic. Cardiovascular:      Rate and Rhythm: Normal rate and regular rhythm. Heart sounds: No murmur heard. Pulmonary:      Effort: Pulmonary effort is normal. No respiratory distress. Breath sounds: Normal breath sounds. Abdominal:      Palpations: Abdomen is soft. Tenderness: There is no abdominal tenderness. Musculoskeletal:         General: Deformity (Bilateral LE amputations) present. No swelling. Cervical back: Neck supple. Skin:     General: Skin is warm and dry. Neurological:      Mental Status: He is alert.    Psychiatric:         Mood and Affect: Mood normal.     Additional Data:     Labs:  Results from last 7 days   Lab Units 10/29/23  0701   WBC Thousand/uL 8.25   HEMOGLOBIN g/dL 9.2*   HEMATOCRIT % 29.8*   PLATELETS Thousands/uL 350   NEUTROS PCT % 68   LYMPHS PCT % 21   MONOS PCT % 8   EOS PCT % 2     Results from last 7 days   Lab Units 10/29/23  0701   SODIUM mmol/L 137   POTASSIUM mmol/L 3.8   CHLORIDE mmol/L 98   CO2 mmol/L 34*   BUN mg/dL 14   CREATININE mg/dL 0.70   ANION GAP mmol/L 5   CALCIUM mg/dL 8.3*   GLUCOSE RANDOM mg/dL 163*         Results from last 7 days   Lab Units 10/29/23  1549 10/29/23  1148 10/29/23  0700 10/28/23  2054 10/28/23  1538 10/28/23  1057 10/28/23  0732 10/27/23  2105 10/27/23  1609 10/27/23  1138 10/27/23  0715 10/26/23  2116   POC GLUCOSE mg/dl 148* 178* 155* 171* 162* 151* 154* 169* 152* 131 145* 175*               Lines/Drains:  Invasive Devices       Peripheral Intravenous Line  Duration             Peripheral IV 10/25/23 Left Hand 4 days Imaging: No pertinent imaging reviewed. Recent Cultures (last 7 days):         Last 24 Hours Medication List:   Current Facility-Administered Medications   Medication Dose Route Frequency Provider Last Rate    acetaminophen  650 mg Oral Q6H PRN Kristin Lopez MD      ALPRAZolam  0.25 mg Oral BID PRN Kristin Lopez MD      aspirin  81 mg Oral Daily Kristin Lopez MD      atorvastatin  80 mg Oral Daily With Malena Mayer MD      clopidogrel  75 mg Oral Daily Kristin Lopez MD      docusate sodium  100 mg Oral BID Kristin Lopez MD      enoxaparin  40 mg Subcutaneous Daily Kristin Lopez MD      finasteride  5 mg Oral Daily Kristin Lopez MD      furosemide  40 mg Oral BID (diuretic) Kristin Lopez MD      insulin lispro  1-5 Units Subcutaneous TID [facility] Kristin Lopez MD      magnesium hydroxide  30 mL Oral Daily PRN Kristin Lopez MD      melatonin  6 mg Oral HS Kristin Lopez MD      metoclopramide  5 mg Intravenous Q6H PRN Rashida Childers MD      morphine injection  2 mg Intravenous Q4H PRN Rashida Childers MD      ondansetron  4 mg Intravenous Q4H PRN Rashida Childers MD      oxyCODONE  10 mg Oral Q4H PRN Rashida Childers MD      oxyCODONE  5 mg Oral Q4H PRN Rashida Childers MD      pantoprazole  40 mg Oral Early Morning Kristin Lopez MD      senna  1 tablet Oral HS Kristin Lopez MD      tamsulosin  0.4 mg Oral Daily With Malena Mayer MD          Today, Patient Was Seen By: Violet Huang MD    **Please Note: This note may have been constructed using a voice recognition system. **

## 2023-10-30 LAB
GLUCOSE SERPL-MCNC: 158 MG/DL (ref 65–140)
GLUCOSE SERPL-MCNC: 159 MG/DL (ref 65–140)
GLUCOSE SERPL-MCNC: 169 MG/DL (ref 65–140)

## 2023-10-30 PROCEDURE — 82948 REAGENT STRIP/BLOOD GLUCOSE: CPT

## 2023-10-30 PROCEDURE — 99232 SBSQ HOSP IP/OBS MODERATE 35: CPT | Performed by: STUDENT IN AN ORGANIZED HEALTH CARE EDUCATION/TRAINING PROGRAM

## 2023-10-30 PROCEDURE — 88307 TISSUE EXAM BY PATHOLOGIST: CPT | Performed by: PATHOLOGY

## 2023-10-30 PROCEDURE — 99024 POSTOP FOLLOW-UP VISIT: CPT | Performed by: NURSE PRACTITIONER

## 2023-10-30 PROCEDURE — 99232 SBSQ HOSP IP/OBS MODERATE 35: CPT | Performed by: INTERNAL MEDICINE

## 2023-10-30 PROCEDURE — 88311 DECALCIFY TISSUE: CPT | Performed by: PATHOLOGY

## 2023-10-30 RX ADMIN — PANTOPRAZOLE SODIUM 40 MG: 40 TABLET, DELAYED RELEASE ORAL at 05:26

## 2023-10-30 RX ADMIN — ATORVASTATIN CALCIUM 80 MG: 80 TABLET, FILM COATED ORAL at 17:19

## 2023-10-30 RX ADMIN — TAMSULOSIN HYDROCHLORIDE 0.4 MG: 0.4 CAPSULE ORAL at 17:19

## 2023-10-30 RX ADMIN — FUROSEMIDE 40 MG: 40 TABLET ORAL at 17:19

## 2023-10-30 RX ADMIN — CLOPIDOGREL BISULFATE 75 MG: 75 TABLET ORAL at 08:10

## 2023-10-30 RX ADMIN — INSULIN LISPRO 1 UNITS: 100 INJECTION, SOLUTION INTRAVENOUS; SUBCUTANEOUS at 13:03

## 2023-10-30 RX ADMIN — ENOXAPARIN SODIUM 40 MG: 40 INJECTION SUBCUTANEOUS at 08:11

## 2023-10-30 RX ADMIN — ASPIRIN 81 MG: 81 TABLET, COATED ORAL at 08:10

## 2023-10-30 RX ADMIN — FINASTERIDE 5 MG: 5 TABLET, FILM COATED ORAL at 08:10

## 2023-10-30 RX ADMIN — INSULIN LISPRO 1 UNITS: 100 INJECTION, SOLUTION INTRAVENOUS; SUBCUTANEOUS at 17:19

## 2023-10-30 RX ADMIN — INSULIN LISPRO 1 UNITS: 100 INJECTION, SOLUTION INTRAVENOUS; SUBCUTANEOUS at 08:11

## 2023-10-30 RX ADMIN — FUROSEMIDE 40 MG: 40 TABLET ORAL at 08:10

## 2023-10-30 NOTE — PROGRESS NOTES
[facility]  Progress Note  Name: Last Gamboa  MRN: [id number]  Unit/Bed#: E4 -01 I Date of Admission: 10/19/2023   Date of Service: 10/30/2023 I Hospital Day: 11    Assessment/Plan   Bacteremia due to Proteus species  Assessment & Plan  Patient presented with bacteremia with Proteus and Bacteroides   Secondary to right foot wound with gangrene  Completed course of antibiotics per ID, with 7d of ceftriaxone and Flagyl    S/P CABG x 3  Assessment & Plan  History of CAD status post CABG  Continue on aspirin, Plavix, statin  Elevated troponin on admission attributed to non-MI troponin elevation secondary to sepsis  Evaluated by the cardiology team, no evidence of ischemia    Chronic heart failure with preserved ejection fraction [facility])  Assessment & Plan  Wt Readings from Last 3 Encounters:   10/26/23 103 kg (226 lb 3.1 oz)   09/02/23 118 kg (259 lb 7.7 oz)   07/31/23 118 kg (260 lb)     Patient has a history of chronic diastolic congestive heart failure  Most recent 2D echocardiogram 10/22: Left ventricular ejection fraction 61%. Grade 1 diastolic dysfunction.   Patient was placed on IV Lasix due to lower extremity edema  Lungs clear to auscultation  transitioned back to home Lasix 40 mg p.o. twice daily  Currently euvolemic     Diabetic gastroparesis   Assessment & Plan  patient with previous retching after admission:  resolved after trial of scheduled reglan  Possibly secondary to gastroparesis, versus intolerance to antibiotics especially Flagyl  Cont reglan prn    S/P BKA (below knee amputation), left ([facility])  Assessment & Plan  With prosthesis in place    PAD (peripheral artery disease) ([address])  Assessment & Plan  Patient follows with vascular surgery outpatient  LEADS: "Diffuse atherosclerotic arterial disease noted, with a 50-75% stenosis in the  proximal superficial femoral artery and a 50-75% stenosis in the proximal superficial femoral artery"  vascular surgery performed AKA 10/25  Continue aspirin, Plavix, statin      Hyperlipidemia  Assessment & Plan  Continue Lipitor 80 mg daily    Type 2 diabetes mellitus with diabetic neuropathy, with long-term current use of insulin [facility])  Assessment & Plan  Lab Results   Component Value Date    HGBA1C 6.7 (A) 2023       Recent Labs     10/29/23  1148 10/29/23  1549 10/29/23  2116 10/30/23  0625   POCGLU 178* 148* 143* 158*     Patient with poor p.o. intake and borderline blood sugars  decreased regimen to avoid hypoglycemia  Continue home regimen is Lantus 40 units twice daily    * Gangrene [facility])  Assessment & Plan  Patient presented with gangrene of the right heel  Had gotten progressively worse since his previous admission 2023  Patient was evaluated by podiatry and vascular surgery: Foot deemed nonsalvageable  AKA performed by vascular surgery 10/25  VAC in place until 10/30  Cont pain medications  Case mgmt following with plans for STR with possible LTC after             VTE Pharmacologic Prophylaxis:   Pharmacologic: Enoxaparin (Lovenox)  Mechanical VTE Prophylaxis in Place: Yes    Patient Centered Rounds: I have performed bedside rounds with nursing staff today. Discussions with Specialists or Other Care Team Provider: Case mgmt    Education and Discussions with Family / Patient: wife on phone    Current Length of Stay: 6 day(s)    Current Patient Status: Inpatient   Certification Statement: The patient will continue to require additional inpatient hospital stay due to pending STR    Discharge Plan: pending    Code Status: Level 1 - Full Code      Subjective:   No events overnight, no complaints. Pain controlled. Tolerating diet. Objective:     Vitals:   Temp (24hrs), Av.4 °F (36.3 °C), Min:97.1 °F (36.2 °C), Max:97.5 °F (36.4 °C)    Temp:  [97.1 °F (36.2 °C)-97.5 °F (36.4 °C)] 97.5 °F (36.4 °C)  HR:  [60-71] 60  Resp:  [18] 18  BP: ()/(54-61) 113/58  SpO2:  [96 %] 96 %  Body mass index is 29.84 kg/m².      Input and Output Summary (last 24 hours): Intake/Output Summary (Last 24 hours) at 10/30/2023 1219  Last data filed at 10/29/2023 2101  Gross per 24 hour   Intake --   Output 400 ml   Net -400 ml       Physical Exam:     Physical Exam  Vitals reviewed. Constitutional:       Appearance: He is obese. HENT:      Head: Normocephalic and atraumatic. Eyes:      General: No scleral icterus. Conjunctiva/sclera: Conjunctivae normal.   Cardiovascular:      Rate and Rhythm: Normal rate. Heart sounds: No murmur heard. Pulmonary:      Effort: Pulmonary effort is normal. No respiratory distress. Abdominal:      General: There is no distension. Musculoskeletal:         General: Deformity (Right AKA, Left BKA) present. Comments: Left bka   Neurological:      Mental Status: He is oriented to person, place, and time. Psychiatric:      Comments: Mood is depressed       Additional Data:     Labs:    Results from last 7 days   Lab Units 10/29/23  0701   WBC Thousand/uL 8.25   HEMOGLOBIN g/dL 9.2*   HEMATOCRIT % 29.8*   PLATELETS Thousands/uL 350   NEUTROS PCT % 68   LYMPHS PCT % 21   MONOS PCT % 8   EOS PCT % 2     Results from last 7 days   Lab Units 10/29/23  0701   SODIUM mmol/L 137   POTASSIUM mmol/L 3.8   CHLORIDE mmol/L 98   CO2 mmol/L 34*   BUN mg/dL 14   CREATININE mg/dL 0.70   ANION GAP mmol/L 5   CALCIUM mg/dL 8.3*   GLUCOSE RANDOM mg/dL 163*         Results from last 7 days   Lab Units 10/30/23  0625 10/29/23  2116 10/29/23  1549 10/29/23  1148 10/29/23  0700 10/28/23  2054 10/28/23  1538 10/28/23  1057 10/28/23  0732 10/27/23  2105 10/27/23  1609 10/27/23  1138   POC GLUCOSE mg/dl 158* 143* 148* 178* 155* 171* 162* 151* 154* 169* 152* 131                   * I Have Reviewed All Lab Data Listed Above. * Additional Pertinent Lab Tests Reviewed:  All Labs For Current Hospital Admission Reviewed    Imaging:    CT lower extremity wo contrast right    Result Date: 10/19/2023  Impression: Severe diffuse subcutaneous edema without subcutaneous gas. Limited evaluation for abscess without IV contrast; no apparent discrete collection. No CT signs of osteomyelitis. The study was marked in [facility] for immediate notification. Workstation performed: [id number]     XR chest 2 views    Result Date: 10/19/2023  Impression: No acute cardiopulmonary disease. Workstation performed: [id number]     XR foot 3+ views RIGHT    Result Date: 10/19/2023  Impression: No acute osseous abnormality. Large ulcer overlying the plantar aspect of the calcaneus which appears slightly larger than on the prior study. Resident: Kaylin Norris, the attending radiologist, have reviewed the images and agree with the final report above.  Workstation performed: [id number]        Recent Cultures (last 7 days):           Last 24 Hours Medication List:   Current Facility-Administered Medications   Medication Dose Route Frequency Provider Last Rate    acetaminophen  650 mg Oral Q6H PRN Mickie Frank MD      ALPRAZolam  0.25 mg Oral BID PRN Mickie Frank MD      aspirin  81 mg Oral Daily Mickie Frank MD      atorvastatin  80 mg Oral Daily With Mariam Malagon MD      clopidogrel  75 mg Oral Daily Mickie Frank MD      docusate sodium  100 mg Oral BID Mickie Frank MD      enoxaparin  40 mg Subcutaneous Daily Mickie Frank MD      finasteride  5 mg Oral Daily Mickie Frank MD      furosemide  40 mg Oral BID (diuretic) Mickie Frank MD      insulin lispro  1-5 Units Subcutaneous TID [facility] Mickie Frank MD      magnesium hydroxide  30 mL Oral Daily PRN Mickie Frank MD      melatonin  6 mg Oral HS Mickie Frank MD      metoclopramide  5 mg Intravenous Q6H PRN Sommer Alvarez MD      morphine injection  2 mg Intravenous Q4H PRN Sommer Alvarez MD      ondansetron  4 mg Intravenous Q4H PRN Sommer Alvarez MD      oxyCODONE  10 mg Oral Q4H PRN Sommer Alvarez MD      oxyCODONE  5 mg Oral Q4H PRN Sommer Alvarez MD      pantoprazole  40 mg Oral Early Morning MD franci Blank 1 tablet Oral HS Robert Addison MD      tamsulosin  0.4 mg Oral Daily With Jules Good MD          Today, Patient Was Seen By: Antonietta Torres MD    ** Please Note: Dictation voice to text software may have been used in the creation of this document.  **

## 2023-10-30 NOTE — PROGRESS NOTES
Progress Note - Infectious Disease   Nina Benjamin 58 y.o. male MRN: [id number]  Unit/Bed#: E4 -01 Encounter: [id number]      IMPRESSION & RECOMMENDATIONS:   Impression/Recommendations:  1. Sepsis, POA. Tachycardia, tachypnea, leukocytosis. Secondary to bacteremia. Fortunately, patient is afebrile and hemodynamically stable. WBC count overall much improved. Remains stable from ID standpoint after completion of antibiotic course. -Continue to observe off any more antibiotics.  -Follow temperatures and hemodynamics  -Follow CBC     2. Polymicrobial bacteremia. Proteus/Bacteroides. Secondary to #3. No other clear explanation. Prior wound culture from the foot did isolate Proteus, in addition to E. coli and anaerobes. Status post 7-day course of IV antibiotic completed 10/26.     -Continue to observe off any more antibiotics. 3.  Progressive right heel gangrene. Prior wound cultures August 2023 with growth of E. coli, Proteus, Bacteroides. CT without subcutaneous gas or discrete abscess. Podiatry evaluation noted and foot has been deemed nonsalvageable. Patient is now status post right AKA on 10/25.     -No further antibiotic indicated for this.  -Vascular surgery follow-up ongoing. 4.  Diabetes mellitus type 2, with peripheral neuropathy and long-term insulin use. Risk factor for infection and poor wound healing. 5.  PAD post left BKA 2018. 6.  Elevated troponin. Cardiology input noted with suspicion for demand ischemia in the setting of acute infection. Patient had prior CABG in 2018. Cardiology follow-up ongoing. Antibiotics:  Off antibiotic 4  POD5    As no ongoing acute ID issues, will sign off. Please call us with any new questions. Subjective:  Patient is overall doing well. No acute events over the weekend. No fevers.     Objective:  Vitals:  Temp:  [97.1 °F (36.2 °C)-97.5 °F (36.4 °C)] 97.5 °F (36.4 °C)  HR:  [60-71] 60  Resp:  [18] 18  BP: ()/(54-61) 113/58  SpO2:  [96 %] 96 %  Temp (24hrs), Av.4 °F (36.3 °C), Min:97.1 °F (36.2 °C), Max:97.5 °F (36.4 °C)  Current: Temperature: 97.5 °F (36.4 °C)    Physical Exam:   General:  No acute distress, nontoxic  HEENT: Atraumatic normocephalic  Neck: Trachea midline  Psychiatric:  Awake and alert  Pulmonary:  Normal respiratory excursion without accessory muscle use  Abdomen:  Soft, nontender  Extremities: Left BKA, right AKA dressing intact  Skin:  No rashes  Neuro: Moves all extremities spontaneously. Lab Results:  I have personally reviewed pertinent labs. Results from last 7 days   Lab Units 10/29/23  0701 10/26/23  0453 10/25/23  0533   POTASSIUM mmol/L 3.8 3.9 3.6   CHLORIDE mmol/L 98 99 98   CO2 mmol/L 34* 29 30   BUN mg/dL 14 16 18   CREATININE mg/dL 0.70 1.27 0.87   EGFR ml/min/1.73sq m 99 59 91   CALCIUM mg/dL 8.3* 7.7* 8.5     Results from last 7 days   Lab Units 10/29/23  0701 10/27/23  0629 10/26/23  0453   WBC Thousand/uL 8.25 11.37* 11.89*   HEMOGLOBIN g/dL 9.2* 9.1* 9.9*   PLATELETS Thousands/uL 350 315 314           Imaging Studies:   I have personally reviewed pertinent imaging study reports and images in PACS. EKG, Pathology, and Other Studies:   I have personally reviewed pertinent reports.

## 2023-10-30 NOTE — PROGRESS NOTES
[facility]  Progress Note  Name: Funmilayo Zuniga  MRN: [id number]  Unit/Bed#: E4 -01 I Date of Admission: 10/19/2023   Date of Service: 10/30/2023 I Hospital Day: 6    Assessment/Plan   PAD (peripheral artery disease) [facility])  Assessment & Plan  59 yo male former smoker (quit 1994) with a hx of DM II, CAD S/P CABG w/ R GSV, L BKA (by Dr. Gurjit Lui 8/3/18 for tissue loss), PAD and chronic R heel ulcer presented to [facility] on [facility]23 w/ weakness, chills, nausea and vomiting. Pt admitted w/ a worsening R foot wound and proteus bacteremia, likely secondary to foot wound. Per podiatry, limb is unsalvageable, and vascular surgery was consulted for higher level amputation. Pt is S/P R AKA on 10/25 w/ uneventful post-op course. Diagnostics:   -10/22/23: Echo: LVEF 75% w/ normal systolic function and mildly abnormal diastolic dysfunction  -ISAK 10/20/23: R YUDITH: 0.95/7/-; Diffuse atherosclerotic disease, 50-75% prox SFA x 2; unable to visualize calf vessels due to edema, depth  -CT RLE 10/19/23: severe diffuse subcutaneous edema without subcutaneous gas; no OM. -Xray R foot 10/19/23: large ulcer overlying plantar aspect of calcaneous. Plan:  -Nonsalvageable RLE secondary to infected heal wound, per podiatry  -S/P R AKA on 10/25  -Continue prevena vac to surgical incision; remove on 11/1  -Continue ACE wrap to R AKA for prevena vac protection  -Sepsis bacteremia resolved: BC (+) proteus; rocephin and flagyl completed  -Continue ASA, plavix and lipitor  -Continue medical management per primary team  -Case management following for dispo planning; will need rehab w/ possible transition to LTC. -Will discuss w/ Dr. Jose Miguel Del Angel Doctor      Subjective:  Pt seen for exam while sitting in his recliner chair; NAD. Pt denies any pain at R AKA site. Per documentation, prn oxycodone was used once on 10/26/23. He denies any complaints at this time.     Vitals:  /58 (BP Location: Right arm)   Pulse 60 Temp 97.5 °F (36.4 °C) (Temporal)   Resp 18   Ht 6' 1" (1.854 m)   Wt 103 kg (226 lb 3.1 oz)   SpO2 96%   BMI 29.84 kg/m²     I/Os:  I/O last 3 completed shifts: In: 480 [P.O.:480]  Out: 725 [Urine:725]  No intake/output data recorded.     Lab Results and Cultures:   Lab Results   Component Value Date    WBC 8.25 10/29/2023    HGB 9.2 (L) 10/29/2023    HCT 29.8 (L) 10/29/2023    MCV 87 10/29/2023     10/29/2023     Lab Results   Component Value Date    GLUCOSE 165 (H) 03/28/2018    CALCIUM 8.3 (L) 10/29/2023     09/13/2016    K 3.8 10/29/2023    CO2 34 (H) 10/29/2023    CL 98 10/29/2023    BUN 14 10/29/2023    CREATININE 0.70 10/29/2023     Lab Results   Component Value Date    INR 1.08 10/19/2023    INR 1.08 08/29/2023    INR 0.99 08/29/2021    PROTIME 14.0 10/19/2023    PROTIME 14.0 08/29/2023    PROTIME 12.9 08/29/2021     Blood Culture:   Lab Results   Component Value Date    BLOODCX Proteus mirabilis (A) 10/19/2023    BLOODCX Bacteroides thetaiotaomicron group (A) 10/19/2023     Urinalysis:   Lab Results   Component Value Date    COLORU Light Yellow 10/26/2023    CLARITYU Clear 10/26/2023    SPECGRAV 1.013 10/26/2023    PHUR 5.5 10/26/2023    PHUR 7.5 05/23/2018    LEUKOCYTESUR Negative 10/26/2023    NITRITE Negative 10/26/2023    GLUCOSEU Negative 10/26/2023    KETONESU 10 (1+) (A) 10/26/2023    BILIRUBINUR Negative 10/26/2023    BLOODU Trace (A) 10/26/2023       Medications:  Current Facility-Administered Medications   Medication Dose Route Frequency    acetaminophen (TYLENOL) tablet 650 mg  650 mg Oral Q6H PRN    ALPRAZolam (XANAX) tablet 0.25 mg  0.25 mg Oral BID PRN    aspirin (ECOTRIN LOW STRENGTH) EC tablet 81 mg  81 mg Oral Daily    atorvastatin (LIPITOR) tablet 80 mg  80 mg Oral Daily With Dinner    clopidogrel (PLAVIX) tablet 75 mg  75 mg Oral Daily    docusate sodium (COLACE) capsule 100 mg  100 mg Oral BID    enoxaparin (LOVENOX) subcutaneous injection 40 mg  40 mg Subcutaneous Daily    finasteride (PROSCAR) tablet 5 mg  5 mg Oral Daily    furosemide (LASIX) tablet 40 mg  40 mg Oral BID (diuretic)    insulin lispro (HumaLOG) 100 units/mL subcutaneous injection 1-5 Units  1-5 Units Subcutaneous TID AC    magnesium hydroxide (MILK OF MAGNESIA) oral suspension 30 mL  30 mL Oral Daily PRN    melatonin tablet 6 mg  6 mg Oral HS    metoclopramide (REGLAN) injection 5 mg  5 mg Intravenous Q6H PRN    morphine injection 2 mg  2 mg Intravenous Q4H PRN    ondansetron (ZOFRAN) injection 4 mg  4 mg Intravenous Q4H PRN    oxyCODONE (ROXICODONE) immediate release tablet 10 mg  10 mg Oral Q4H PRN    oxyCODONE (ROXICODONE) IR tablet 5 mg  5 mg Oral Q4H PRN    pantoprazole (PROTONIX) EC tablet 40 mg  40 mg Oral Early Morning    senna (SENOKOT) tablet 8.6 mg  1 tablet Oral HS    tamsulosin (FLOMAX) capsule 0.4 mg  0.4 mg Oral Daily With Dinner       Imaging:  See above    Physical Exam:    General appearance: alert and oriented, in no acute distress  Skin: Skin color, texture, turgor normal. No rashes or lesions  Neurologic: Grossly normal  Head: Normocephalic, without obvious abnormality, atraumatic  Lungs: clear to auscultation bilaterally  Heart: regular rate and rhythm  Abdomen: soft, non-tender; bowel sounds normal; no masses,  no organomegaly  Extremities:  extremities warm and well-perfused; L BKA, R  AKA    Wound/Incision:  R AKA prevena vac intact w/ small area of dried blood on posterior aspect of vac sponge; good seal, vac functioning appropriately.  ACE wrap on R AKA stump for prevena vac protection    Pulse exam:  Radial: Right: 2+ Left[de-identified] 2+      CARLTON Mike  10/30/2023

## 2023-10-30 NOTE — ASSESSMENT & PLAN NOTE
Patient presented with gangrene of the right heel  Had gotten progressively worse since his previous admission 8/2023  Patient was evaluated by podiatry and vascular surgery:  Foot deemed nonsalvageable  AKA performed by vascular surgery 10/25  VAC in place until 10/30  Cont pain medications  Case mgmt following with plans for STR with possible LTC after

## 2023-10-30 NOTE — ASSESSMENT & PLAN NOTE
59 yo male former smoker (quit 1994) with a hx of DM II, CAD S/P CABG w/ R GSV, L BKA (by Dr. Griffith Holding 8/3/18 for tissue loss), PAD and chronic R heel ulcer presented to [facility] on 10/19/23 w/ weakness, chills, nausea and vomiting. Pt admitted w/ a worsening R foot wound and proteus bacteremia, likely secondary to foot wound. Per podiatry, limb is unsalvageable, and vascular surgery was consulted for higher level amputation. Pt is S/P R AKA on 10/25 w/ uneventful post-op course. Diagnostics:   -10/22/23: Echo: LVEF 59% w/ normal systolic function and mildly abnormal diastolic dysfunction  -ISAK 10/20/23: R YUDITH: 0.95/7/-; Diffuse atherosclerotic disease, 50-75% prox SFA x 2; unable to visualize calf vessels due to edema, depth  -CT RLE 10/19/23: severe diffuse subcutaneous edema without subcutaneous gas; no OM. -Xray R foot 10/19/23: large ulcer overlying plantar aspect of calcaneous. Plan:  -Nonsalvageable RLE secondary to infected heal wound, per podiatry  -S/P R AKA on 10/25  -Continue prevena vac to surgical incision; remove on 11/1  -Continue ACE wrap to R AKA for prevena vac protection  -Sepsis bacteremia resolved: BC (+) proteus; rocephin and flagyl completed  -Continue ASA, plavix and lipitor  -Continue medical management per primary team  -Case management following for dispo planning; will need rehab w/ possible transition to LTC.   -Will discuss w/ Dr. Aaliyah Sofia Doctor

## 2023-10-30 NOTE — DISCHARGE INSTR - OTHER ORDERS
DISCHARGE INSTRUCTIONS   PREVENA INCISION THERAPY NEGATIVE PRESSURE VAC    ACTIVITY:   Limit your physical activity with the limb with the Prevena VAC therapy. Walking up steps and normal activities may be resumed as you feel ready. Avoid strenuous activity such as vigorous exercise. Avoid heavy lifting (do not lift more than 15 pounds) while you have the Jerrol Leo in place. You should not drive a car while you have the Jerrol Leo in place. Your provider will instruct you when it is safe to drive. You may ride in a car. DIET:  Resume your normal diet. Good nutrition is important for healing of your incision. If you are discharged on narcotics for pain control, continue taking your stool softeners until you are having regular bowel movements. Prevena Vac: Keep vac in place for a total of 7 days from your day of surgery. You may NOT shower or bathe while wound VAC is in place. On 11/1/23, remove Prevena vac using the following instructions:  1. Hold power button down for approximately 5 seconds until vac turns off. You will hear an audible beep as the vac turns off. Once vac is turned off, the purple sponge will expand. 2. Disconnect tubing. 3. Gently remove clear tape and purple sponge. 4. Dispose of all parts of the vac in a regular garbage can. INCISION:  Once Prevena Vac removed, you should shower daily. Wash incision daily with soap and water, but do not rub or scrub the incision; rinse thoroughly and pat dry. You may have stitches or staples to close your incision and it is okay for these to get wet. Do not bathe in a tub or swim for the first 4 week following surgery or if you have any open wounds. It is normal to have swelling or discoloration around the incision. If increasing redness or pain develops, call our office immediately. Numbness in the region of the incision may occur following the surgery. This normally improves over six to twelve months.     You may have surgical glue over your incisions. There are stitches present under the skin which will absorb on their own. The glue is used to cover the access, assist in closure, and prevent contamination. This adhesive will darken and peel away on its own within one to two weeks. Do not pick at it. If you have a groin wound/incision, place a clean dry piece of gauze to cover your groin incision to keep incision clean and dry and prevent your skin from sticking together. Change gauze daily. You may have staples or stitches at your incisions. These will be removed at your follow-up appointment when they are ready to come out. If you have foot or leg wounds, please follow your podiatrist/wound care doctor's instructions for care. If any of your incisions are open and require dressing changes, you will be given instructions for your daily incision care. If you are not able to change the dressings, a visiting nurse will be arranged. DO NOT put any powders, creams, ointments, or lotions on your incision. SWELLING: Most patients have noticeable swelling after leg surgery. This usually improves within a few weeks. If swelling is present, elevate the affected limb whenever possible. FOLLOW UP APPOINTMENTS:  Making and keeping follow up appointments and ultrasound tests are important to your recovery. If you have difficulty making it to or keeping your follow up appointments, call the office. If you have increased pain, fever >101.5, nausea, vomiting, increased drainage, redness, warmth, swelling, change in color of drainage/pus, or a bad smell at your Wound VAC site, new coldness/numbness of your arm or leg, or become dizzy or confused please call us immediately and GO directly to the ER. PLEASE CALL THE OFFICE IF YOU HAVE ANY QUESTIONS  [phone]  [phone]  [facility] FREE [phone]  [address] [facility] [address], [place] ([place]), [address] [place], [place] [address], [address] [place] [address]  [address], [facility], [address] [facility]  [address] [facility], [address] [facility], [place], [address]  [address] [facility],Sixth Floor, 1st Floor, [place], [address]  [address], 2nd Floor, [address], [place], [address]  [address], [address] [place] [address] Peterson Marquez ([place]), [address] [facility]  [address] [facility], [address] [facility], [address] [facility], [address]  [address] [facility] , [facility]  [address] [facility], [place], [address] [place] [address]

## 2023-10-30 NOTE — ASSESSMENT & PLAN NOTE
Wt Readings from Last 3 Encounters:   10/26/23 103 kg (226 lb 3.1 oz)   09/02/23 118 kg (259 lb 7.7 oz)   07/31/23 118 kg (260 lb)     Patient has a history of chronic diastolic congestive heart failure  Most recent 2D echocardiogram 10/22: Left ventricular ejection fraction 61%. Grade 1 diastolic dysfunction.   Patient was placed on IV Lasix due to lower extremity edema  Lungs clear to auscultation  transitioned back to home Lasix 40 mg p.o. twice daily  Currently euvolemic

## 2023-10-30 NOTE — CASE MANAGEMENT
Case Management Discharge Planning Note    Patient name Leigh Mckeon  Location [facility] [address] [facility] 456/E4 MS 26-* MRN [id number]  : 1960 Date 10/30/2023       Current Admission Date: 10/19/2023  Current Admission Diagnosis:Gangrene [facility])   Patient Active Problem List    Diagnosis Date Noted    Anemia 10/26/2023    Gangrene ([address]) 10/23/2023    Sepsis ([address]) 10/20/2023    Bacteremia due to Proteus species 10/20/2023    Elevated troponin 10/19/2023    Loose stools 2023    Diabetic ulcer of right heel ([address]) 2023    Lymphedema in adult patient 2023    Cellulitis of right ankle 2023    Non-pressure chronic ulcer of right calf with fat layer exposed ([address]) 2023    Embolism and thrombosis of arteries of the lower extremities ([address]) 10/29/2020    Lymphedema of right lower extremity 10/29/2020    Venous stasis dermatitis of right lower extremity 10/29/2020    Abdominal distension 10/29/2020    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    Nodular rash 2020    CAD (coronary artery disease) 2019    Phantom limb syndrome without pain ([address]) 10/04/2018    Obstructive sleep apnea 2018    Hyponatremia 2018    Diabetic autonomic neuropathy associated with type 2 diabetes mellitus ([address]) 2018    S/P CABG x 3 2018    Chronic heart failure with preserved ejection fraction ([address]) 2018    Hypertensive heart disease with congestive heart failure ([address])     Triple vessel coronary artery disease 2018    Diabetic gastroparesis  2018    Class 2 severe obesity due to excess calories with serious comorbidity and body mass index (BMI) of 35.0 to 35.9 in adult  2018    Orthostatic hypotension 2018    PAD (peripheral artery disease) ([address]) 2018    S/P BKA (below knee amputation), left ([address]) 2018    Anxiety and depression 2017    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 2017    Vitamin D deficiency 09/20/2016    Hyperlipidemia 02/11/2015    Type 2 diabetes mellitus with diabetic neuropathy, with long-term current use of insulin ([address]) 11/06/2014      LOS (days): 11  Geometric Mean LOS (GMLOS) (days): 9.90  Days to GMLOS:-1.1     OBJECTIVE:  Risk of Unplanned Readmission Score: 26.56         Current admission status: Inpatient   Preferred Pharmacy:   [facility] [address] [facility] [address]  Phone: [phone] Fax: [phone]    [facility] ([address] [facility]  [address] [facility]  [address] [place] [address]  Phone: [phone] Fax: [phone] [address] [facility], [address] [facility]  [address] [facility]  Phone: [phone] Fax: [phone]    Primary Care Provider: Malissa Mauricio DO    Primary Insurance: Michael Moreno [facility] HOSPITAL REP  Secondary Insurance:     DISCHARGE DETAILS:    Discharge planning discussed with[de-identified] Patient                           Additional Comments: CM received a message from [facility] stating that patient is unable to sleep in a recliner due to safety issues. CM relayed this to patient and he was very upset. Patient states that is how he sleeps. Patient states he wants to talk over options with his wife. CM educated patient and wife for over an hour that he may transition to long term care after rehabilitation if he chooses to do so. CM encouraged patient's wife to tour long term care facilities. Patient's wife states she does not have time to tour long term care facilities and that she will be homeless if they take the patient's disability income for long term care. CM educated that patient may be able to return home using more equiptment than before and he may be able to get around on his own. Patient's wife did not want to discuss him returning home and stated this was not an option.  CM again reiterated that long term care facilities will take patient's disability income. CM is was unsure how much they would use for long term care and relayed that to patient's wife. CM still awaiting optioning paperwork to come back and for patient to pick a rehabilitation. [facility] also states they are checking with their business office to make sure they can offer a bed. Cm to follow up tomorrow.

## 2023-10-30 NOTE — ARC ADMISSION
Patient's case reviewed with [facility] physician. Pt is approved for [facility]C pending ins authorization, medical stability,bed availability, and clarification about access in and out of home . [facility] admission will continue to follow patient for clarification and discharge readiness.

## 2023-10-30 NOTE — ASSESSMENT & PLAN NOTE
Lab Results   Component Value Date    HGBA1C 6.7 (A) 02/02/2023       Recent Labs     10/29/23  1148 10/29/23  1549 10/29/23  2116 10/30/23  0625   POCGLU 178* 148* 143* 158*     Patient with poor p.o. intake and borderline blood sugars  decreased regimen to avoid hypoglycemia  Continue home regimen is Lantus 40 units twice daily

## 2023-10-30 NOTE — CASE MANAGEMENT
Case Management Discharge Planning Note    Patient name [place]annie [place] Location [facility] [address] [facility] [place] 456/E4 MS 26-* MRN [id number]  : 1960 Date 10/30/2023       Current Admission Date: 10/19/2023  Current Admission Diagnosis:Gangrene [facility])   Patient Active Problem List    Diagnosis Date Noted    Anemia 10/26/2023    Gangrene ([address]) 10/23/2023    Sepsis ([address]) 10/20/2023    Bacteremia due to Proteus species 10/20/2023    Elevated troponin 10/19/2023    Loose stools 2023    Diabetic ulcer of right heel ([address]) 2023    Lymphedema in adult patient 2023    Cellulitis of right ankle 2023    Non-pressure chronic ulcer of right calf with fat layer exposed ([address]) 2023    Embolism and thrombosis of arteries of the lower extremities ([address]) 10/29/2020    Lymphedema of right lower extremity 10/29/2020    Venous stasis dermatitis of right lower extremity 10/29/2020    Abdominal distension 10/29/2020    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    Nodular rash 2020    CAD (coronary artery disease) 2019    Phantom limb syndrome without pain ([address]) 10/04/2018    Obstructive sleep apnea 2018    Hyponatremia 2018    Diabetic autonomic neuropathy associated with type 2 diabetes mellitus ([address]) 2018    S/P CABG x 3 2018    Chronic heart failure with preserved ejection fraction ([address]) 2018    Hypertensive heart disease with congestive heart failure ([address])     Triple vessel coronary artery disease 2018    Diabetic gastroparesis  2018    Class 2 severe obesity due to excess calories with serious comorbidity and body mass index (BMI) of 35.0 to 35.9 in adult  2018    Orthostatic hypotension 2018    PAD (peripheral artery disease) ([address]) 2018    S/P BKA (below knee amputation), left ([address]) 2018    Anxiety and depression 2017    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 2017    Vitamin D deficiency 09/20/2016    Hyperlipidemia 02/11/2015    Type 2 diabetes mellitus with diabetic neuropathy, with long-term current use of insulin ([address]) 11/06/2014      LOS (days): 11  Geometric Mean LOS (GMLOS) (days): 9.90  Days to GMLOS:-1     OBJECTIVE:  Risk of Unplanned Readmission Score: 26.51         Current admission status: Inpatient   Preferred Pharmacy:   [facility] [address] RAZIA PEREIRA [address], [address]  [place] [address]  Phone: [phone] Fax: [phone]    [facility] ([address] [facility]  [address] [place] [address]  Phone: [phone] Fax: [phone] [address] [facility], [address]  Phone: [phone] Fax: [phone]    Primary Care Provider: Janet Alfaro DO    Primary Insurance: [facility] REP  Secondary Insurance:     DISCHARGE DETAILS:               Additional Comments: Patient chose [facility] short term rehab to transition into long term care. CM sent a message to [facility] and they are double checking they have a long term care bed available.

## 2023-10-30 NOTE — ASSESSMENT & PLAN NOTE
History of CAD status post CABG  Continue on aspirin, Plavix, statin  Elevated troponin on admission attributed to non-MI troponin elevation secondary to sepsis  Evaluated by the cardiology team, no evidence of ischemia

## 2023-10-30 NOTE — ASSESSMENT & PLAN NOTE
Patient presented with bacteremia with Proteus and Bacteroides   Secondary to right foot wound with gangrene  Completed course of antibiotics per ID, with 7d of ceftriaxone and Flagyl

## 2023-10-30 NOTE — ASSESSMENT & PLAN NOTE
With prosthesis in place Benefits, risks, and possible complications of procedure explained to patient/caregiver who verbalized understanding and gave verbal consent.

## 2023-10-30 NOTE — ASSESSMENT & PLAN NOTE
patient with previous retching after admission:  resolved after trial of scheduled reglan  Possibly secondary to gastroparesis, versus intolerance to antibiotics especially Flagyl  Cont reglan prn

## 2023-10-31 LAB
GLUCOSE SERPL-MCNC: 155 MG/DL (ref 65–140)
GLUCOSE SERPL-MCNC: 167 MG/DL (ref 65–140)
GLUCOSE SERPL-MCNC: 188 MG/DL (ref 65–140)

## 2023-10-31 PROCEDURE — 97530 THERAPEUTIC ACTIVITIES: CPT

## 2023-10-31 PROCEDURE — 97110 THERAPEUTIC EXERCISES: CPT

## 2023-10-31 PROCEDURE — 82948 REAGENT STRIP/BLOOD GLUCOSE: CPT

## 2023-10-31 PROCEDURE — 99024 POSTOP FOLLOW-UP VISIT: CPT | Performed by: NURSE PRACTITIONER

## 2023-10-31 PROCEDURE — 97535 SELF CARE MNGMENT TRAINING: CPT

## 2023-10-31 PROCEDURE — 99232 SBSQ HOSP IP/OBS MODERATE 35: CPT | Performed by: STUDENT IN AN ORGANIZED HEALTH CARE EDUCATION/TRAINING PROGRAM

## 2023-10-31 PROCEDURE — 97542 WHEELCHAIR MNGMENT TRAINING: CPT

## 2023-10-31 RX ADMIN — FUROSEMIDE 40 MG: 40 TABLET ORAL at 09:19

## 2023-10-31 RX ADMIN — ATORVASTATIN CALCIUM 80 MG: 80 TABLET, FILM COATED ORAL at 16:26

## 2023-10-31 RX ADMIN — TAMSULOSIN HYDROCHLORIDE 0.4 MG: 0.4 CAPSULE ORAL at 16:26

## 2023-10-31 RX ADMIN — INSULIN LISPRO 1 UNITS: 100 INJECTION, SOLUTION INTRAVENOUS; SUBCUTANEOUS at 12:41

## 2023-10-31 RX ADMIN — ENOXAPARIN SODIUM 40 MG: 40 INJECTION SUBCUTANEOUS at 09:19

## 2023-10-31 RX ADMIN — CLOPIDOGREL BISULFATE 75 MG: 75 TABLET ORAL at 09:19

## 2023-10-31 RX ADMIN — INSULIN LISPRO 1 UNITS: 100 INJECTION, SOLUTION INTRAVENOUS; SUBCUTANEOUS at 17:02

## 2023-10-31 RX ADMIN — PANTOPRAZOLE SODIUM 40 MG: 40 TABLET, DELAYED RELEASE ORAL at 06:20

## 2023-10-31 RX ADMIN — INSULIN LISPRO 1 UNITS: 100 INJECTION, SOLUTION INTRAVENOUS; SUBCUTANEOUS at 09:19

## 2023-10-31 RX ADMIN — FUROSEMIDE 40 MG: 40 TABLET ORAL at 16:26

## 2023-10-31 RX ADMIN — ASPIRIN 81 MG: 81 TABLET, COATED ORAL at 09:19

## 2023-10-31 RX ADMIN — Medication 6 MG: at 21:27

## 2023-10-31 RX ADMIN — FINASTERIDE 5 MG: 5 TABLET, FILM COATED ORAL at 09:19

## 2023-10-31 NOTE — PROGRESS NOTES
[facility]  Progress Note  Name: Claudia Riggs  MRN: [id number]  Unit/Bed#: E4 -01 I Date of Admission: 10/19/2023   Date of Service: 10/31/2023 I Hospital Day: 12    Assessment/Plan   PAD (peripheral artery disease) [facility])  Assessment & Plan  57 yo male former smoker (quit 1994) with a hx of DM II, CAD S/P CABG w/ R GSV, L BKA (by Dr. Rosa Maria France 8/3/18 for tissue loss), PAD and chronic R heel ulcer presented to [facility] on 10/19/23 w/ weakness, chills, nausea and vomiting. Pt admitted w/ a worsening R foot wound and proteus bacteremia, likely secondary to foot wound. Per podiatry, limb is unsalvageable, and vascular surgery was consulted for higher level amputation. Pt is S/P R AKA on 10/25 w/ uneventful post-op course. Plan:  -Nonsalvageable RLE secondary to infected heal wound, per podiatry  -S/P R AKA on 10/25  -Continue prevena vac to surgical incision; remove on 11/1  -Continue ACE wrap to R AKA, per pt request  -Sepsis bacteremia resolved: BC (+) proteus; rocephin and flagyl completed  -Continue ASA, plavix and lipitor  -Continue medical management per primary team  -Continue PT/OT  -Pt is okay for discharge from a vascular standpoint  -Case management following for dispo planning; will need rehab w/ transition to LTC. -Will discuss w/ Dr. Zoie Diaz      Subjective:  Pt seen for exam while sitting in his recliner chair; NAD. Pt denies any pain at R AKA site. He requests ACE wrap stay on as he does not like looking at the prevena vac. Otherwise, pt denies any further complaints at this time. Vitals:  /68 (BP Location: Right arm)   Pulse 68   Temp 98 °F (36.7 °C) (Temporal)   Resp 18   Ht 6' 1" (1.854 m)   Wt 103 kg (226 lb 3.1 oz)   SpO2 94%   BMI 29.84 kg/m²     I/Os:  I/O last 3 completed shifts: In: 120 [P.O.:120]  Out: 1300 [Urine:1300]  No intake/output data recorded.     Lab Results and Cultures:   Lab Results   Component Value Date    WBC 8.25 10/29/2023    HGB 9.2 (L) 10/29/2023    HCT 29.8 (L) 10/29/2023    MCV 87 10/29/2023     10/29/2023     Lab Results   Component Value Date    GLUCOSE 165 (H) 03/28/2018    CALCIUM 8.3 (L) 10/29/2023     09/13/2016    K 3.8 10/29/2023    CO2 34 (H) 10/29/2023    CL 98 10/29/2023    BUN 14 10/29/2023    CREATININE 0.70 10/29/2023     Lab Results   Component Value Date    INR 1.08 10/19/2023    INR 1.08 08/29/2023    INR 0.99 08/29/2021    PROTIME 14.0 10/19/2023    PROTIME 14.0 08/29/2023    PROTIME 12.9 08/29/2021     Blood Culture:   Lab Results   Component Value Date    BLOODCX Proteus mirabilis (A) 10/19/2023    BLOODCX Bacteroides thetaiotaomicron group (A) 10/19/2023     Urinalysis:   Lab Results   Component Value Date    COLORU Light Yellow 10/26/2023    CLARITYU Clear 10/26/2023    SPECGRAV 1.013 10/26/2023    PHUR 5.5 10/26/2023    PHUR 7.5 05/23/2018    LEUKOCYTESUR Negative 10/26/2023    NITRITE Negative 10/26/2023    GLUCOSEU Negative 10/26/2023    KETONESU 10 (1+) (A) 10/26/2023    BILIRUBINUR Negative 10/26/2023    BLOODU Trace (A) 10/26/2023         Medications:  Current Facility-Administered Medications   Medication Dose Route Frequency    acetaminophen (TYLENOL) tablet 650 mg  650 mg Oral Q6H PRN    ALPRAZolam (XANAX) tablet 0.25 mg  0.25 mg Oral BID PRN    aspirin (ECOTRIN LOW STRENGTH) EC tablet 81 mg  81 mg Oral Daily    atorvastatin (LIPITOR) tablet 80 mg  80 mg Oral Daily With Dinner    clopidogrel (PLAVIX) tablet 75 mg  75 mg Oral Daily    docusate sodium (COLACE) capsule 100 mg  100 mg Oral BID    enoxaparin (LOVENOX) subcutaneous injection 40 mg  40 mg Subcutaneous Daily    finasteride (PROSCAR) tablet 5 mg  5 mg Oral Daily    furosemide (LASIX) tablet 40 mg  40 mg Oral BID (diuretic)    insulin lispro (HumaLOG) 100 units/mL subcutaneous injection 1-5 Units  1-5 Units Subcutaneous TID AC    magnesium hydroxide (MILK OF MAGNESIA) oral suspension 30 mL  30 mL Oral Daily PRN    melatonin tablet 6 mg 6 mg Oral HS    metoclopramide (REGLAN) injection 5 mg  5 mg Intravenous Q6H PRN    morphine injection 2 mg  2 mg Intravenous Q4H PRN    ondansetron (ZOFRAN) injection 4 mg  4 mg Intravenous Q4H PRN    oxyCODONE (ROXICODONE) immediate release tablet 10 mg  10 mg Oral Q4H PRN    oxyCODONE (ROXICODONE) IR tablet 5 mg  5 mg Oral Q4H PRN    pantoprazole (PROTONIX) EC tablet 40 mg  40 mg Oral Early Morning    senna (SENOKOT) tablet 8.6 mg  1 tablet Oral HS    tamsulosin (FLOMAX) capsule 0.4 mg  0.4 mg Oral Daily With Dinner       Physical Exam:    General appearance: alert and oriented, in no acute distress  Skin: Skin color, texture, turgor normal. No rashes or lesions  Neurologic: Grossly normal  Head: Normocephalic, without obvious abnormality, atraumatic  Lungs: clear to auscultation bilaterally  Heart: regular rate and rhythm  Abdomen: soft, non-tender; bowel sounds normal; no masses,  no organomegaly  Extremities:  extremities warm and well-perfused; L BKA, R AKA    Wound/Incision:  R AKA prevena vac intact w/ small area of dried blood on posterior aspect of vac sponge; good seal, vac functioning appropriately.  R AKA stump rewrapped w/ ACE wrap, per pt request.    Pulse exam:  Radial: Right: 2+ Left[de-identified] 2+      CARLTON Segovia  10/31/2023

## 2023-10-31 NOTE — PLAN OF CARE
Problem: OCCUPATIONAL THERAPY ADULT  Goal: Performs self-care activities at highest level of function for planned discharge setting. See evaluation for individualized goals. Description: Treatment Interventions: ADL retraining, Functional transfer training, UE strengthening/ROM, Endurance training, Patient/family training, Equipment evaluation/education, Compensatory technique education, Continued evaluation, Activityengagement          See flowsheet documentation for full assessment, interventions and recommendations. Outcome: Progressing  Note: Limitation: Decreased ADL status, Decreased UE strength, Decreased endurance, Decreased sensation, Decreased self-care trans, Decreased high-level ADLs  Prognosis: Good  Assessment: Pt seen for OT treatment session focusing on functional activity tolerance, ADLs, functional transfers/mobility, and Safe transfer techniques. Pt alert and cooperative throughout session. Pt  seated OOB in recliner  at start of session. Forward scooting from recliner > EOB completed w/ Min A of 2 with pt using B/L UEs on armrests to clear buttocks from chair/EOB surface. Sliding board transfer completed from EOB<>W/C with Max-Mod A of 2 with assist for trunk support and steadying during transition. Pt required Total A for placement/removal of sliding board. Pt required increased assist for sliding board transfer from W/C>EOB 2* EOB surface higher than W/C surface. Backward scooting completed w/ Mod A of 2 from EOB>Recliner with assist for weight shifting throughout transitions. 3rd person present throughout all transitions per pt's request. ADL tasks completed while seated OOB in chair. Grooming tasks completed w/ Supervision w/ setup required and increased time to complete. LB Dressing completed w/ Mod A 2* assist to thread R LE into underwear and assist to simulate threading underwear over hips. Pt seated OOB in chair at end of session. Call bell and phone within reach.  All needs met and pt reports no further questions for OT at this time. Continue to recommend STR when medically cleared. OT to follow pt on caseload.      Rehab Resource Intensity Level, OT: I (Maximum Resource Intensity)

## 2023-10-31 NOTE — RESTORATIVE TECHNICIAN NOTE
Restorative Technician Note      Patient Name: Felipa Kelly     Restorative Tech Visit Date: 10/31/23  Note Type: Mobility  Patient Position Upon Consult: Bedside chair  Mobility / Activity Provided: Assisted PT, OT & PTA with transfer  Activity Performed: Range of motion; Repositioned; Transferred  Assistive Device: Other (Comment); Wheelchair (Sliding board)  Patient Position at End of Consult: Bedside chair;  All needs within reach

## 2023-10-31 NOTE — ASSESSMENT & PLAN NOTE
Lab Results   Component Value Date    HGBA1C 6.7 (A) 02/02/2023       Recent Labs     10/30/23  1230 10/30/23  1627 10/31/23  0746 10/31/23  1038   POCGLU 169* 159* 167* 188*     Patient with poor p.o. intake and borderline blood sugars  decreased regimen to avoid hypoglycemia  Continue home regimen is Lantus 40 units twice daily

## 2023-10-31 NOTE — CASE MANAGEMENT
Case Management Discharge Planning Note    Patient name Kasie Walker  Location [facility] [address] [facility] [place] 456/E4 MS 26-* MRN [id number]  : 1960 Date 10/31/2023       Current Admission Date: 10/19/2023  Current Admission Diagnosis:Gangrene [facility])   Patient Active Problem List    Diagnosis Date Noted    Anemia 10/26/2023    Gangrene ([address]) 10/23/2023    Sepsis ([address]) 10/20/2023    Bacteremia due to Proteus species 10/20/2023    Elevated troponin 10/19/2023    Loose stools 2023    Diabetic ulcer of right heel ([address]) 2023    Lymphedema in adult patient 2023    Cellulitis of right ankle 2023    Non-pressure chronic ulcer of right calf with fat layer exposed ([address]) 2023    Embolism and thrombosis of arteries of the lower extremities ([address]) 10/29/2020    Lymphedema of right lower extremity 10/29/2020    Venous stasis dermatitis of right lower extremity 10/29/2020    Abdominal distension 10/29/2020    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    Nodular rash 2020    CAD (coronary artery disease) 2019    Phantom limb syndrome without pain ([address]) 10/04/2018    Obstructive sleep apnea 2018    Hyponatremia 2018    Diabetic autonomic neuropathy associated with type 2 diabetes mellitus ([address]) 2018    S/P CABG x 3 2018    Chronic heart failure with preserved ejection fraction ([address]) 2018    Hypertensive heart disease with congestive heart failure ([address])     Triple vessel coronary artery disease 2018    Diabetic gastroparesis  2018    Class 2 severe obesity due to excess calories with serious comorbidity and body mass index (BMI) of 35.0 to 35.9 in adult  2018    Orthostatic hypotension 2018    PAD (peripheral artery disease) ([address]) 2018    S/P BKA (below knee amputation), left ([address]) 2018    Anxiety and depression 2017    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 2017    Vitamin D deficiency 09/20/2016    Hyperlipidemia 02/11/2015    Type 2 diabetes mellitus with diabetic neuropathy, with long-term current use of insulin ([address]) 11/06/2014      LOS (days): 12  Geometric Mean LOS (GMLOS) (days): 9.90  Days to GMLOS:-1.9     OBJECTIVE:  Risk of Unplanned Readmission Score: 26.79         Current admission status: Inpatient   Preferred Pharmacy:   [facility] [address] RAZIA PEREIRA [address], [address]  [place] [address]  Phone: [phone] Fax: [phone]    [facility] ([address]  [address] [place] [address]  Phone: [phone] Fax: [facility], [address] [place] [address] [facility]  Phone: [phone] Fax: [phone]    Primary Care Provider: Janet Alfaro DO    Primary Insurance: [facility]  Secondary Insurance:     DISCHARGE DETAILS:    Other Referral/Resources/Interventions Provided:  Interventions: Short Term Rehab, SNF  Referral Comments: CM met with patient at bedside per pt's request for continued discharge planning discussion. Pt reports at this time he would like to go to [facility] for STR with the possibily of LTC. CM contacted [facility] and confirmed they can accept. [facility] provided facility and provider NPI. CM submitted NPI information to  discharge support to start insurance authorization.     Treatment Team Recommendation: Short Term Rehab, SNF  Discharge Destination Plan[de-identified] SNF, Short Term Rehab

## 2023-10-31 NOTE — PLAN OF CARE
Problem: PHYSICAL THERAPY ADULT  Goal: Performs mobility at highest level of function for planned discharge setting. See evaluation for individualized goals. Description: Treatment/Interventions: Functional transfer training, LE strengthening/ROM, Therapeutic exercise, Endurance training, Patient/family training, Bed mobility, Gait training, Spoke to nursing, OT  Equipment Recommended: Wheelchair (pt has)       See flowsheet documentation for full assessment, interventions and recommendations. Outcome: Progressing  Note: Prognosis: Good  Problem List: Decreased strength, Decreased endurance, Impaired balance, Decreased mobility, Decreased skin integrity, Obesity (b/l nwb at present time due to inability to wear L le prosthesis)  Assessment: Pt seen for PT treatment session this date with interventions consisting of bed mobility, transfer training, HEP, and w/c mobility, and education provided as needed for safety and direction to improve functional mobility, safety awareness, and activity tolerance. Pt agreeable to PT treatment session upon arrival, pt found seated out of bed in recliner. At end of session, pt left seated out of bed in recliner with all needs in reach. In comparison to previous session, pt with improvement in activity tolerance, endurance, AM- pac score, and functional mobility. Encouragement to trials transfers using backward/ forward scoot technique for recliner<>bed and sliding board for bed<> w/c . Pt performed anterior scoot from recliner into bed with min assist x2 with use of bed pad and posterior scoot from bed into recliner with mod assist x2 with use of bed pad and a third person present for safety and per pt's request. Bed to w/c and max assist x2 w/c to bed, 3rd person present for safety and per pt's request. total assist for placement and removal of SB.  Pt unable to perform transfers or gait training, at this time, with use of prosthesis due to it being ill fitting(too loose) as pt does not have enough sock ply for proper fit. Pt propels w/c in all directions forward, backward making L, R and u- turns 80' x2 with use of b/l ue's. pt requires frequent brief rest breaks due to b/l ue fatigue and hand discomfort. pt demonstrates ability to align w/c next to bed with minimal assist to reposition w/c in preparation for transfer from w/c to bed. total assist for set- up of w/c for sb tx from bed to w/c. Pt  demontsrates good static sitting balance and fair (+) dynamic sitting balance with all transitions. Continue to recommend STR at time of d/c in order to maximize pt's functional independence and safety w/ mobility. Pt continues to be functioning below baseline level. PT will continue to see pt while here in order to address the deficits listed above and provide interventions consistent w/ POC in effort to achieve STGs. The patient's AM-PAC Basic Mobility Inpatient Short Form Raw Score is 12. A raw score less than 16 suggests the patient may benefit from discharge to post-acute rehabilitation services. Please also refer to the recommendation of the Physical Therapist for safe discharge planning. Barriers to Discharge: Inaccessible home environment, Decreased caregiver support     Rehab Resource Intensity Level, PT: I (Maximum Resource Intensity)    See flowsheet documentation for full assessment.

## 2023-10-31 NOTE — PLAN OF CARE
Problem: Potential for Falls  Goal: Patient will remain free of falls  Description: INTERVENTIONS:  - Educate patient/family on patient safety including physical limitations  - Instruct patient to call for assistance with activity   - Consult OT/PT to assist with strengthening/mobility   - Keep Call bell within reach  - Keep bed low and locked with side rails adjusted as appropriate  - Keep care items and personal belongings within reach  - Initiate and maintain comfort rounds  - Make Fall Risk Sign visible to staff  - Offer Toileting every 2 Hours, in advance of need  - Initiate/Maintain bed alarm  - Obtain necessary fall risk management equipment: alarm   - Apply yellow socks and bracelet for high fall risk patients  - Consider moving patient to room near nurses station  Outcome: Progressing     Problem: MOBILITY - ADULT  Goal: Maintain or return to baseline ADL function  Description: INTERVENTIONS:  -  Assess patient's ability to carry out ADLs; assess patient's baseline for ADL function and identify physical deficits which impact ability to perform ADLs (bathing, care of mouth/teeth, toileting, grooming, dressing, etc.)  - Assess/evaluate cause of self-care deficits   - Assess range of motion  - Assess patient's mobility; develop plan if impaired  - Assess patient's need for assistive devices and provide as appropriate  - Encourage maximum independence but intervene and supervise when necessary  - Involve family in performance of ADLs  - Assess for home care needs following discharge   - Consider OT consult to assist with ADL evaluation and planning for discharge  - Provide patient education as appropriate  Outcome: Progressing  Goal: Maintains/Returns to pre admission functional level  Description: INTERVENTIONS:  - Perform BMAT or MOVE assessment daily.   - Set and communicate daily mobility goal to care team and patient/family/caregiver.    - Collaborate with rehabilitation services on mobility goals if consulted  - Perform Range of Motion 3 times a day. - Reposition patient every 2 hours.   - Dangle patient 3 times a day  - Stand patient 3 times a day  - Ambulate patient 3 times a day  - Out of bed to chair 3 times a day   - Out of bed for meals 3 times a day  - Out of bed for toileting  - Record patient progress and toleration of activity level   Outcome: Progressing     Problem: PAIN - ADULT  Goal: Verbalizes/displays adequate comfort level or baseline comfort level  Description: Interventions:  - Encourage patient to monitor pain and request assistance  - Assess pain using appropriate pain scale  - Administer analgesics based on type and severity of pain and evaluate response  - Implement non-pharmacological measures as appropriate and evaluate response  - Consider cultural and social influences on pain and pain management  - Notify physician/advanced practitioner if interventions unsuccessful or patient reports new pain  Outcome: Progressing     Problem: INFECTION - ADULT  Goal: Absence or prevention of progression during hospitalization  Description: INTERVENTIONS:  - Assess and monitor for signs and symptoms of infection  - Monitor lab/diagnostic results  - Monitor all insertion sites, i.e. indwelling lines, tubes, and drains  - Monitor endotracheal if appropriate and nasal secretions for changes in amount and color  - [place] appropriate cooling/warming therapies per order  - Administer medications as ordered  - Instruct and encourage patient and family to use good hand hygiene technique  - Identify and instruct in appropriate isolation precautions for identified infection/condition  Outcome: Progressing     Problem: SAFETY ADULT  Goal: Patient will remain free of falls  Description: INTERVENTIONS:  - Educate patient/family on patient safety including physical limitations  - Instruct patient to call for assistance with activity   - Consult OT/PT to assist with strengthening/mobility   - Keep Call bell within reach  - Keep bed low and locked with side rails adjusted as appropriate  - Keep care items and personal belongings within reach  - Initiate and maintain comfort rounds  - Make Fall Risk Sign visible to staff  - Offer Toileting every 2 Hours, in advance of need  - Initiate/Maintain bed alarm  - Obtain necessary fall risk management equipment: alarm   - Apply yellow socks and bracelet for high fall risk patients  - Consider moving patient to room near nurses station  Outcome: Progressing  Goal: Maintain or return to baseline ADL function  Description: INTERVENTIONS:  -  Assess patient's ability to carry out ADLs; assess patient's baseline for ADL function and identify physical deficits which impact ability to perform ADLs (bathing, care of mouth/teeth, toileting, grooming, dressing, etc.)  - Assess/evaluate cause of self-care deficits   - Assess range of motion  - Assess patient's mobility; develop plan if impaired  - Assess patient's need for assistive devices and provide as appropriate  - Encourage maximum independence but intervene and supervise when necessary  - Involve family in performance of ADLs  - Assess for home care needs following discharge   - Consider OT consult to assist with ADL evaluation and planning for discharge  - Provide patient education as appropriate  Outcome: Progressing  Goal: Maintains/Returns to pre admission functional level  Description: INTERVENTIONS:  - Perform BMAT or MOVE assessment daily.   - Set and communicate daily mobility goal to care team and patient/family/caregiver. - Collaborate with rehabilitation services on mobility goals if consulted  - Perform Range of Motion 3 times a day. - Reposition patient every 2 hours.   - Dangle patient 3 times a day  - Stand patient 3 times a day  - Ambulate patient 3 times a day  - Out of bed to chair 3 times a day   - Out of bed for meals 3 times a day  - Out of bed for toileting  - Record patient progress and toleration of activity level   Outcome: Progressing     Problem: DISCHARGE PLANNING  Goal: Discharge to home or other facility with appropriate resources  Description: INTERVENTIONS:  - Identify barriers to discharge w/patient and caregiver  - Arrange for needed discharge resources and transportation as appropriate  - Identify discharge learning needs (meds, wound care, etc.)  - Arrange for interpretive services to assist at discharge as needed  - Refer to Case Management Department for coordinating discharge planning if the patient needs post-hospital services based on physician/advanced practitioner order or complex needs related to functional status, cognitive ability, or social support system  Outcome: Progressing     Problem: Knowledge Deficit  Goal: Patient/family/caregiver demonstrates understanding of disease process, treatment plan, medications, and discharge instructions  Description: Complete learning assessment and assess knowledge base.   Interventions:  - Provide teaching at level of understanding  - Provide teaching via preferred learning methods  Outcome: Progressing     Problem: Prexisting or High Potential for Compromised Skin Integrity  Goal: Skin integrity is maintained or improved  Description: INTERVENTIONS:  - Identify patients at risk for skin breakdown  - Assess and monitor skin integrity  - Assess and monitor nutrition and hydration status  - Monitor labs   - Assess for incontinence   - Turn and reposition patient  - Assist with mobility/ambulation  - Relieve pressure over bony prominences  - Avoid friction and shearing  - Provide appropriate hygiene as needed including keeping skin clean and dry  - Evaluate need for skin moisturizer/barrier cream  - Collaborate with interdisciplinary team   - Patient/family teaching  - Consider wound care consult   Outcome: Progressing     Problem: Nutrition/Hydration-ADULT  Goal: Nutrient/Hydration intake appropriate for improving, restoring or maintaining nutritional needs  Description: Monitor and assess patient's nutrition/hydration status for malnutrition. Collaborate with interdisciplinary team and initiate plan and interventions as ordered. Monitor patient's weight and dietary intake as ordered or per policy. Utilize nutrition screening tool and intervene as necessary. Determine patient's food preferences and provide high-protein, high-caloric foods as appropriate.      INTERVENTIONS:  - Monitor oral intake, urinary output, labs, and treatment plans  - Assess nutrition and hydration status and recommend course of action  - Evaluate amount of meals eaten  - Assist patient with eating if necessary   - Allow adequate time for meals  - Recommend/ encourage appropriate diets, oral nutritional supplements, and vitamin/mineral supplements  - Order, calculate, and assess calorie counts as needed  - Recommend, monitor, and adjust tube feedings and TPN/PPN based on assessed needs  - Assess need for intravenous fluids  - Provide specific nutrition/hydration education as appropriate  - Include patient/family/caregiver in decisions related to nutrition  Outcome: Progressing

## 2023-10-31 NOTE — ASSESSMENT & PLAN NOTE
Patient presented with gangrene of the right heel  Had gotten progressively worse since his previous admission 8/2023  Patient was evaluated by podiatry and vascular surgery:  Foot deemed nonsalvageable  AKA performed by vascular surgery 10/25  VAC in place until 10/30  Cont pain medications  Case mgmt following with plans for STR

## 2023-10-31 NOTE — PROGRESS NOTES
[facility]  Progress Note  Name: Zoila Azevedo  MRN: [id number]  Unit/Bed#: E4 -01 I Date of Admission: 10/19/2023   Date of Service: 10/31/2023 I Hospital Day: 12    Assessment/Plan   Bacteremia due to Proteus species  Assessment & Plan  Patient presented with bacteremia with Proteus and Bacteroides   Secondary to right foot wound with gangrene  Completed course of antibiotics per ID, with 7d of ceftriaxone and Flagyl    S/P CABG x 3  Assessment & Plan  History of CAD status post CABG  Continue on aspirin, Plavix, statin  Elevated troponin on admission attributed to non-MI troponin elevation secondary to sepsis  Evaluated by the cardiology team, no evidence of ischemia    Chronic heart failure with preserved ejection fraction [facility])  Assessment & Plan  Wt Readings from Last 3 Encounters:   10/26/23 103 kg (226 lb 3.1 oz)   09/02/23 118 kg (259 lb 7.7 oz)   07/31/23 118 kg (260 lb)     Patient has a history of chronic diastolic congestive heart failure  Most recent 2D echocardiogram 10/22: Left ventricular ejection fraction 61%. Grade 1 diastolic dysfunction.   Patient was placed on IV Lasix due to lower extremity edema  Lungs clear to auscultation  transitioned back to home Lasix 40 mg p.o. twice daily  Currently euvolemic     Diabetic gastroparesis   Assessment & Plan  patient with previous retching after admission:  resolved after trial of scheduled reglan  Possibly secondary to gastroparesis, versus intolerance to antibiotics especially Flagyl  Cont reglan prn    S/P BKA (below knee amputation), left (HCC)  Assessment & Plan  With prosthesis in place    PAD (peripheral artery disease) ([address])  Assessment & Plan  Patient follows with vascular surgery outpatient  LEADS: "Diffuse atherosclerotic arterial disease noted, with a 50-75% stenosis in the  proximal superficial femoral artery and a 50-75% stenosis in the proximal superficial femoral artery"  vascular surgery performed AKA 10/25  Continue aspirin, Plavix, statin      Hyperlipidemia  Assessment & Plan  Continue Lipitor 80 mg daily    Type 2 diabetes mellitus with diabetic neuropathy, with long-term current use of insulin [facility])  Assessment & Plan  Lab Results   Component Value Date    HGBA1C 6.7 (A) 2023       Recent Labs     10/30/23  1230 10/30/23  1627 10/31/23  0746 10/31/23  1038   POCGLU 169* 159* 167* 188*     Patient with poor p.o. intake and borderline blood sugars  decreased regimen to avoid hypoglycemia  Continue home regimen is Lantus 40 units twice daily    * Gangrene [facility])  Assessment & Plan  Patient presented with gangrene of the right heel  Had gotten progressively worse since his previous admission 2023  Patient was evaluated by podiatry and vascular surgery: Foot deemed nonsalvageable  AKA performed by vascular surgery 10/25  VAC in place until 10/30  Cont pain medications  Case mgmt following with plans for STR             VTE Pharmacologic Prophylaxis:   Pharmacologic: Enoxaparin (Lovenox)  Mechanical VTE Prophylaxis in Place: Yes    Patient Centered Rounds: I have performed bedside rounds with nursing staff today. Current Length of Stay: 12 day(s)    Current Patient Status: Inpatient   Certification Statement: The patient will continue to require additional inpatient hospital stay due to pending placement    Discharge Plan: 24-48 hours    Code Status: Level 1 - Full Code      Subjective:   Patient currently without medical complaints. Denies any fevers, chills. Pain controlled. Objective:     Vitals:   Temp (24hrs), Av.9 °F (36.6 °C), Min:97 °F (36.1 °C), Max:98.7 °F (37.1 °C)    Temp:  [97 °F (36.1 °C)-98.7 °F (37.1 °C)] 98 °F (36.7 °C)  HR:  [62-68] 68  Resp:  [16-18] 18  BP: (120-140)/(59-68) 137/68  SpO2:  [94 %-97 %] 94 %  Body mass index is 29.84 kg/m². Input and Output Summary (last 24 hours):        Intake/Output Summary (Last 24 hours) at 10/31/2023 1457  Last data filed at 10/31/2023 1889  Gross per 24 hour   Intake 240 ml   Output 900 ml   Net -660 ml       Physical Exam:     Physical Exam  Vitals reviewed. Constitutional:       Appearance: He is obese. HENT:      Head: Normocephalic and atraumatic. Eyes:      General: No scleral icterus. Conjunctiva/sclera: Conjunctivae normal.   Cardiovascular:      Rate and Rhythm: Normal rate. Heart sounds: No murmur heard. Pulmonary:      Effort: Pulmonary effort is normal. No respiratory distress. Abdominal:      General: There is no distension. Musculoskeletal:         General: Deformity (Right AKA, Left BKA) present. Comments: Left bka   Neurological:      Mental Status: He is oriented to person, place, and time. Psychiatric:      Comments: Mood is depressed       Additional Data:     Labs:    Results from last 7 days   Lab Units 10/29/23  0701   WBC Thousand/uL 8.25   HEMOGLOBIN g/dL 9.2*   HEMATOCRIT % 29.8*   PLATELETS Thousands/uL 350   NEUTROS PCT % 68   LYMPHS PCT % 21   MONOS PCT % 8   EOS PCT % 2     Results from last 7 days   Lab Units 10/29/23  0701   SODIUM mmol/L 137   POTASSIUM mmol/L 3.8   CHLORIDE mmol/L 98   CO2 mmol/L 34*   BUN mg/dL 14   CREATININE mg/dL 0.70   ANION GAP mmol/L 5   CALCIUM mg/dL 8.3*   GLUCOSE RANDOM mg/dL 163*         Results from last 7 days   Lab Units 10/31/23  1038 10/31/23  0746 10/30/23  1627 10/30/23  1230 10/30/23  0625 10/29/23  2116 10/29/23  1549 10/29/23  1148 10/29/23  0700 10/28/23  2054 10/28/23  1538 10/28/23  1057   POC GLUCOSE mg/dl 188* 167* 159* 169* 158* 143* 148* 178* 155* 171* 162* 151*                   * I Have Reviewed All Lab Data Listed Above. * Additional Pertinent Lab Tests Reviewed: [facility] Admission Reviewed    Imaging:    CT lower extremity wo contrast right    Result Date: 10/19/2023  Impression: Severe diffuse subcutaneous edema without subcutaneous gas.  Limited evaluation for abscess without IV contrast; no apparent discrete collection. No CT signs of osteomyelitis. The study was marked in [facility] for immediate notification. Workstation performed: [id number]     XR chest 2 views    Result Date: 10/19/2023  Impression: No acute cardiopulmonary disease. Workstation performed: [id number]     XR foot 3+ views RIGHT    Result Date: 10/19/2023  Impression: No acute osseous abnormality. Large ulcer overlying the plantar aspect of the calcaneus which appears slightly larger than on the prior study. Resident: Evelina Arteaga, the attending radiologist, have reviewed the images and agree with the final report above.  Workstation performed: [id number]        Recent Cultures (last 7 days):           Last 24 Hours Medication List:   Current Facility-Administered Medications   Medication Dose Route Frequency Provider Last Rate    acetaminophen  650 mg Oral Q6H PRN Gilson Mcghee MD      ALPRAZolam  0.25 mg Oral BID PRN Gilson Mcghee MD      aspirin  81 mg Oral Daily Gilson Mcghee MD      atorvastatin  80 mg Oral Daily With Carito Wiggins MD      clopidogrel  75 mg Oral Daily Gilson Mcghee MD      docusate sodium  100 mg Oral BID Gilson Mcghee MD      enoxaparin  40 mg Subcutaneous Daily Gilson Mcghee MD      finasteride  5 mg Oral Daily Gilson Mcghee MD      furosemide  40 mg Oral BID (diuretic) Gilson Mcghee MD      insulin lispro  1-5 Units Subcutaneous TID [facility] Gilson Mcghee MD      magnesium hydroxide  30 mL Oral Daily PRN Gilson Mcghee MD      melatonin  6 mg Oral HS Gilson Mcghee MD      metoclopramide  5 mg Intravenous Q6H PRN Nell Villalba MD      morphine injection  2 mg Intravenous Q4H PRN Nell Villalba MD      ondansetron  4 mg Intravenous Q4H PRN Nell Villalba MD      oxyCODONE  10 mg Oral Q4H PRN Nell Villalba MD      oxyCODONE  5 mg Oral Q4H PRN Nell Villalba MD      pantoprazole  40 mg Oral Early Morning Gilson Mcghee MD      senna  1 tablet Oral HS Gilson Mcghee MD      tamsulosin  0.4 mg Oral Daily With Carito Wiggins MD          Today, Patient Was Seen By: Ralph Zayas MD    ** Please Note: Dictation voice to text software may have been used in the creation of this document.  **

## 2023-10-31 NOTE — PHYSICAL THERAPY NOTE
PHYSICAL THERAPY NOTE          Patient Name: Jairo Cooley  FRFXN'N Date: 10/31/2023     10/31/23 0805   Restrictions/Precautions   RLE Weight Bearing Per Order NWB  (R le AKA)   LLE Weight Bearing Per Order   (L le bka)   Braces or Orthoses Other (Comment)  (L le prosthesis, however pt unable to wear to ill fitting(too big, pt reports NOT having any other socks at home in order to wear prosthesis at this time.)   Other Precautions Fall Risk;Multiple lines  (wound vac)   General   Chart Reviewed Yes   Family/Caregiver Present No   Cognition   Overall Cognitive Status WFL   Arousal/Participation Alert; Cooperative   Attention Attends with cues to redirect   Orientation Level Oriented X4   Memory Within functional limits   Following Commands Follows multistep commands without difficulty   Subjective   Subjective I think the best way to get me back to bed is by using the krystyna lift. Bed Mobility   Additional Comments pt  seated out of bed in recliner and remianed out of bed in recliner post PT session. Transfers   Sliding Board transfer 3  Moderate assistance   Additional items Assist x 2; Increased time required;Verbal cues  (bed to w/c and max assist x2 w/c to bed, 3rd person present for safety and per pt's request.  total assist for placement and removal of SB.)   Other 4  Minimal assistance   Additional items Assist x 2;Armrests; Increased time required;Verbal cues; Bedrails  (pt  performed anterior scoot from recliner into bed with min assist x2 with use of bed pad and posterior scoot from bed into recliner with mod assist x2 with use of bed pad and a third person present for safety and per pt's request.)   Ambulation/Elevation   Gait pattern Not appropriate   Ambulation/Elevation Additional Comments pt unable to perform transfers or gait training, at this time, with use of prosthesis due to it being ill fitting(too loose) as pt  does not have enough sock ply for proper fit. Wheelchair Activities   Wheelchair Cushion None   Wheelchair Parts Management Yes   Left Armrest Level of Assistance Dependent   Right Armrest Level of Assistance Dependent   Left Brakes Level of Assistance Close supervision   Right Brakes Level of Assistance Close supervision   Propulsion Yes   Propulsion Type 1 Manual   Level 1 Level tile   Method 1 Right upper extremity; Left upper extremity   Level of Assistance 1 Distant supervision;Minimal verbal cues   Description/ Details 1 pt propels w/c in all directions forward, backward making L, R and u- turns 80' x2 with use of b/l ue's. pt  requires frequent brief rest breaks due to b/l ue fatigue and hand discomfort. pt demonstrates ability to align w/c next to bed with minimal assist to reposition w/c in preparation for transfer from w/c to bed.  total assist for set- up of w/c for sb tx from bed to w/c. Balance   Static Sitting Good   Dynamic Sitting Fair +   Static Standing Zero   Dynamic Standing   (zero)   Ambulatory Zero   Endurance Deficit   Endurance Deficit Description ue fatigue, generalized fatigue   Activity Tolerance   Activity Tolerance Patient limited by fatigue   Medical Staff Made Aware otr, pb, restorative therapsit Nelson   Exercises   Quad Sets Sitting;10 reps;AROM; Left  (longsitting)   UE Exercise Bilateral  (w/c push up, triacep extension strengthening) while performing forward scoot transfer into bed,  pt  lifting bottocks off bed and scooting forward with use of b/l ue's x 4)   Balance training  transfer training performed with forward and backward scoot transfers from recliner<> bed and slidingboard transfers from bed <>w/c.  pt  perforfms laterals weight shifting with backward scoot transfer. Assessment   Prognosis Good   Problem List Decreased strength;Decreased endurance; Impaired balance;Decreased mobility; Decreased skin integrity;Obesity  (b/l nwb at present time due to inability to wear L le prosthesis)   Assessment Pt seen for PT treatment session this date with interventions consisting of bed mobility, transfer training, HEP, and w/c mobility, and education provided as needed for safety and direction to improve functional mobility, safety awareness, and activity tolerance. Pt agreeable to PT treatment session upon arrival, pt found seated out of bed in recliner. At end of session, pt left seated out of bed in recliner with all needs in reach. In comparison to previous session, pt with improvement in activity tolerance, endurance, AM- pac score, and functional mobility. Encouragement to trials transfers using backward/ forward scoot technique for recliner<>bed and sliding board for bed<> w/c . Pt performed anterior scoot from recliner into bed with min assist x2 with use of bed pad and posterior scoot from bed into recliner with mod assist x2 with use of bed pad and a third person present for safety and per pt's request. Bed to w/c and max assist x2 w/c to bed, 3rd person present for safety and per pt's request. total assist for placement and removal of SB. Pt unable to perform transfers or gait training, at this time, with use of prosthesis due to it being ill fitting(too loose) as pt does not have enough sock ply for proper fit. Pt propels w/c in all directions forward, backward making L, R and u- turns 80' x2 with use of b/l ue's. pt requires frequent brief rest breaks due to b/l ue fatigue and hand discomfort. pt demonstrates ability to align w/c next to bed with minimal assist to reposition w/c in preparation for transfer from w/c to bed. total assist for set- up of w/c for sb tx from bed to w/c. Pt  demontsrates good static sitting balance and fair (+) dynamic sitting balance with all transitions. Continue to recommend STR at time of d/c in order to maximize pt's functional independence and safety w/ mobility. Pt continues to be functioning below baseline level.  PT will continue to see pt while here in order to address the deficits listed above and provide interventions consistent w/ POC in effort to achieve STGs. The patient's AM-PAC Basic Mobility Inpatient Short Form Raw Score is 12. A raw score less than 16 suggests the patient may benefit from discharge to post-acute rehabilitation services. Please also refer to the recommendation of the Physical Therapist for safe discharge planning. Goals   Patient Goals To be able to get a prosthetic leg for my r le. STG Expiration Date 11/09/23   PT Treatment Day 3   Plan   Treatment/Interventions Functional transfer training;LE strengthening/ROM; Therapeutic exercise; Endurance training;Patient/family training;Equipment eval/education; Bed mobility;OT;Spoke to nursing  (w/c mobility and management training.)   Progress Progressing toward goals   PT Frequency 3-5x/wk   Discharge Recommendation   Rehab Resource Intensity Level, PT I (Maximum Resource Intensity)   AM-PAC Basic Mobility Inpatient   Turning in Flat Bed Without Bedrails 3   Lying on Back to Sitting on Edge of Flat Bed Without Bedrails 3   Moving Bed to Chair 2   Standing Up From Chair Using Arms 2   Walk in Room 1   Climb 3-5 Stairs With Railing 1   Basic Mobility Inpatient Raw Score 12   Basic Mobility Standardized Score 32.23   Highest Level Of Mobility   JH-HLM Goal 4: Move to chair/commode   JH-HLM Achieved 4: Move to chair/commode   Education   Education Provided Mobility training;Home exercise program;Assistive device   Patient Demonstrates acceptance/verbal understanding   End of Consult   Patient Position at End of Consult Bedside chair; All needs within reach      10/31/23 1505   Restrictions/Precautions   RLE Weight Bearing Per Order NWB  (R le AKA)   LLE Weight Bearing Per Order   (L le bka)   Braces or Orthoses Other (Comment)  (L le prosthesis, however pt unable to wear to ill fitting(too big, pt reports NOT having any other socks at home in order to wear prosthesis at this time.) Other Precautions Fall Risk;Multiple lines  (wound vac)   General   Chart Reviewed Yes   Family/Caregiver Present No   Cognition   Overall Cognitive Status WFL   Arousal/Participation Alert; Cooperative   Attention Attends with cues to redirect   Orientation Level Oriented X4   Memory Within functional limits   Following Commands Follows multistep commands without difficulty   Subjective   Subjective I think the best way to get me back to bed is by using the krystyna lift. Bed Mobility   Additional Comments pt  seated out of bed in recliner and remianed out of bed in recliner post PT session. Transfers   Sliding Board transfer 3  Moderate assistance   Additional items Assist x 2; Increased time required;Verbal cues  (bed to w/c and max assist x2 w/c to bed, 3rd person present for safety and per pt's request.  total assist for placement and removal of SB.)   Other 4  Minimal assistance   Additional items Assist x 2;Armrests; Increased time required;Verbal cues; Bedrails  (pt  performed anterior scoot from recliner into bed with min assist x2 with use of bed pad and posterior scoot from bed into recliner with mod assist x2 with use of bed pad and a third person present for safety and per pt's request.)   Ambulation/Elevation   Gait pattern Not appropriate   Ambulation/Elevation Additional Comments pt unable to perform transfers or gait training, at this time, with use of prosthesis due to it being ill fitting(too loose) as pt  does not have enough sock ply for proper fit. Wheelchair Activities   Wheelchair Cushion None   Wheelchair Parts Management Yes   Left Armrest Level of Assistance Dependent   Right Armrest Level of Assistance Dependent   Left Brakes Level of Assistance Close supervision   Right Brakes Level of Assistance Close supervision   Propulsion Yes   Propulsion Type 1 Manual   Level 1 Level tile   Method 1 Right upper extremity; Left upper extremity   Level of Assistance 1 Distant supervision;Minimal verbal cues   Description/ Details 1 pt propels w/c in all directions forward, backward making L, R and u- turns 80' x2 with use of b/l ue's. pt  requires frequent brief rest breaks due to b/l ue fatigue and hand discomfort. pt demonstrates ability to align w/c next to bed with minimal assist to reposition w/c in preparation for transfer from w/c to bed.  total assist for set- up of w/c for sb tx from bed to w/c. Balance   Static Sitting Good   Dynamic Sitting Fair +   Static Standing Zero   Dynamic Standing   (zero)   Ambulatory Zero   Endurance Deficit   Endurance Deficit Description ue fatigue, generalized fatigue   Activity Tolerance   Activity Tolerance Patient limited by fatigue   Medical Staff Made Aware otr, pb, restorative therapsit Nelson   Exercises   Quad Sets Sitting;10 reps;AROM; Left  (longsitting)   UE Exercise Bilateral  (w/c push up, triacep extension strengthening) while performing forward scoot transfer into bed,  pt  lifting bottocks off bed and scooting forward with use of b/l ue's x 4)   Balance training  transfer training performed with forward and backward scoot transfers from recliner<> bed and slidingboard transfers from bed <>w/c.  pt  perforfms laterals weight shifting with backward scoot transfer. Assessment   Prognosis Good   Problem List Decreased strength;Decreased endurance; Impaired balance;Decreased mobility; Decreased skin integrity;Obesity  (b/l nwb at present time due to inability to wear L le prosthesis)   Assessment Pt seen for PT treatment session this date with interventions consisting of bed mobility, transfer training, HEP, and w/c mobility, and education provided as needed for safety and direction to improve functional mobility, safety awareness, and activity tolerance. Pt agreeable to PT treatment session upon arrival, pt found seated out of bed in recliner. At end of session, pt left seated out of bed in recliner with all needs in reach.  In comparison to previous session, pt with improvement in activity tolerance, endurance, AM- pac score, and functional mobility. Encouragement to trials transfers using backward/ forward scoot technique for recliner<>bed and sliding board for bed<> w/c . Pt performed anterior scoot from recliner into bed with min assist x2 with use of bed pad and posterior scoot from bed into recliner with mod assist x2 with use of bed pad and a third person present for safety and per pt's request. Bed to w/c and max assist x2 w/c to bed, 3rd person present for safety and per pt's request. total assist for placement and removal of SB. Pt unable to perform transfers or gait training, at this time, with use of prosthesis due to it being ill fitting(too loose) as pt does not have enough sock ply for proper fit. Pt propels w/c in all directions forward, backward making L, R and u- turns 80' x2 with use of b/l ue's. pt requires frequent brief rest breaks due to b/l ue fatigue and hand discomfort. pt demonstrates ability to align w/c next to bed with minimal assist to reposition w/c in preparation for transfer from w/c to bed. total assist for set- up of w/c for sb tx from bed to w/c. Pt  demontsrates good static sitting balance and fair (+) dynamic sitting balance with all transitions. Continue to recommend STR at time of d/c in order to maximize pt's functional independence and safety w/ mobility. Pt continues to be functioning below baseline level. PT will continue to see pt while here in order to address the deficits listed above and provide interventions consistent w/ POC in effort to achieve STGs. The patient's AM-PAC Basic Mobility Inpatient Short Form Raw Score is 12. A raw score less than 16 suggests the patient may benefit from discharge to post-acute rehabilitation services. Please also refer to the recommendation of the Physical Therapist for safe discharge planning. Goals   Patient Goals To be able to get a prosthetic leg for my r le.    STG Expiration Date 11/09/23   PT Treatment Day 3   Plan   Treatment/Interventions Functional transfer training;LE strengthening/ROM; Therapeutic exercise; Endurance training;Patient/family training;Equipment eval/education; Bed mobility;OT;Spoke to nursing  (w/c mobility and management training.)   Progress Progressing toward goals   PT Frequency 3-5x/wk   Discharge Recommendation   Rehab Resource Intensity Level, PT I (Maximum Resource Intensity)   AM-PAC Basic Mobility Inpatient   Turning in Flat Bed Without Bedrails 3   Lying on Back to Sitting on Edge of Flat Bed Without Bedrails 3   Moving Bed to Chair 2   Standing Up From Chair Using Arms 2   Walk in Room 1   Climb 3-5 Stairs With Railing 1   Basic Mobility Inpatient Raw Score 12   Basic Mobility Standardized Score 32.23   Highest Level Of Mobility   JH-HLM Goal 4: Move to chair/commode   JH-HLM Achieved 4: Move to chair/commode   Education   Education Provided Mobility training;Home exercise program;Assistive device   Patient Demonstrates acceptance/verbal understanding   End of Consult   Patient Position at End of Consult Bedside chair; All needs within reach   Lili Schwartz

## 2023-10-31 NOTE — CASE MANAGEMENT
[facility] received request for authorization from Care Manager. Authorization request for: SNF  Facility Name: [facility] NPI: [id number]    Facility MD: Tri Manning NPI: [id number]   Authorization initiated by contacting insurance: [employer] Chowdhury: Phone  Pending Reference #: [id number]  Clinicals submitted via:  Fax

## 2023-10-31 NOTE — DISCHARGE INSTR - APPOINTMENTS
11/20/23 at 10 am with CARLTON De La Paz at [facility]; [facility] [place], [facility]

## 2023-10-31 NOTE — ASSESSMENT & PLAN NOTE
57 yo male former smoker (quit 1994) with a hx of DM II, CAD S/P CABG w/ R GSV, L BKA (by Dr. Scarlett Cantrell 8/3/18 for tissue loss), PAD and chronic R heel ulcer presented to [facility] on 10/19/23 w/ weakness, chills, nausea and vomiting. Pt admitted w/ a worsening R foot wound and proteus bacteremia, likely secondary to foot wound. Per podiatry, limb is unsalvageable, and vascular surgery was consulted for higher level amputation. Pt is S/P R AKA on 10/25 w/ uneventful post-op course. Plan:  -Nonsalvageable RLE secondary to infected heal wound, per podiatry  -S/P R AKA on 10/25  -Continue prevena vac to surgical incision; remove on 11/1  -Continue ACE wrap to R AKA, per pt request  -Sepsis bacteremia resolved: BC (+) proteus; rocephin and flagyl completed  -Continue ASA, plavix and lipitor  -Continue medical management per primary team  -Continue PT/OT  -Pt is okay for discharge from a vascular standpoint  -Case management following for dispo planning; will need rehab w/ transition to LTC.   -Will discuss w/ Dr. Salazar Standing

## 2023-10-31 NOTE — PLAN OF CARE
Problem: Potential for Falls  Goal: Patient will remain free of falls  Description: INTERVENTIONS:  - Educate patient/family on patient safety including physical limitations  - Instruct patient to call for assistance with activity   - Consult OT/PT to assist with strengthening/mobility   - Keep Call bell within reach  - Keep bed low and locked with side rails adjusted as appropriate  - Keep care items and personal belongings within reach  - Initiate and maintain comfort rounds  - Make Fall Risk Sign visible to staff  - Offer Toileting every  Hours, in advance of need  - Initiate/Maintain alarm  - Obtain necessary fall risk management equipment:   - Apply yellow socks and bracelet for high fall risk patients  - Consider moving patient to room near nurses station  Outcome: Progressing     Problem: MOBILITY - ADULT  Goal: Maintain or return to baseline ADL function  Description: INTERVENTIONS:  -  Assess patient's ability to carry out ADLs; assess patient's baseline for ADL function and identify physical deficits which impact ability to perform ADLs (bathing, care of mouth/teeth, toileting, grooming, dressing, etc.)  - Assess/evaluate cause of self-care deficits   - Assess range of motion  - Assess patient's mobility; develop plan if impaired  - Assess patient's need for assistive devices and provide as appropriate  - Encourage maximum independence but intervene and supervise when necessary  - Involve family in performance of ADLs  - Assess for home care needs following discharge   - Consider OT consult to assist with ADL evaluation and planning for discharge  - Provide patient education as appropriate  Outcome: Progressing  Goal: Maintains/Returns to pre admission functional level  Description: INTERVENTIONS:  - Perform BMAT or MOVE assessment daily.   - Set and communicate daily mobility goal to care team and patient/family/caregiver.    - Collaborate with rehabilitation services on mobility goals if consulted  - Perform Range of Motion  times a day. - Reposition patient every  hours.   - Dangle patient  times a day  - Stand patient  times a day  - Ambulate patient  times a day  - Out of bed to chair  times a day   - Out of bed for meals  times a day  - Out of bed for toileting  - Record patient progress and toleration of activity level   Outcome: Progressing     Problem: PAIN - ADULT  Goal: Verbalizes/displays adequate comfort level or baseline comfort level  Description: Interventions:  - Encourage patient to monitor pain and request assistance  - Assess pain using appropriate pain scale  - Administer analgesics based on type and severity of pain and evaluate response  - Implement non-pharmacological measures as appropriate and evaluate response  - Consider cultural and social influences on pain and pain management  - Notify physician/advanced practitioner if interventions unsuccessful or patient reports new pain  Outcome: Progressing     Problem: INFECTION - ADULT  Goal: Absence or prevention of progression during hospitalization  Description: INTERVENTIONS:  - Assess and monitor for signs and symptoms of infection  - Monitor lab/diagnostic results  - Monitor all insertion sites, i.e. indwelling lines, tubes, and drains  - Monitor endotracheal if appropriate and nasal secretions for changes in amount and color  - [place] appropriate cooling/warming therapies per order  - Administer medications as ordered  - Instruct and encourage patient and family to use good hand hygiene technique  - Identify and instruct in appropriate isolation precautions for identified infection/condition  Outcome: Progressing     Problem: SAFETY ADULT  Goal: Patient will remain free of falls  Description: INTERVENTIONS:  - Educate patient/family on patient safety including physical limitations  - Instruct patient to call for assistance with activity   - Consult OT/PT to assist with strengthening/mobility   - Keep Call bell within reach  - Keep bed low and locked with side rails adjusted as appropriate  - Keep care items and personal belongings within reach  - Initiate and maintain comfort rounds  - Make Fall Risk Sign visible to staff  - Offer Toileting every ours, in advance of need  - Initiate/Maintain alarm  - Obtain necessary fall risk management equipment:   - Apply yellow socks and bracelet for high fall risk patients  - Consider moving patient to room near nurses station  Outcome: Progressing  Goal: Maintain or return to baseline ADL function  Description: INTERVENTIONS:  -  Assess patient's ability to carry out ADLs; assess patient's baseline for ADL function and identify physical deficits which impact ability to perform ADLs (bathing, care of mouth/teeth, toileting, grooming, dressing, etc.)  - Assess/evaluate cause of self-care deficits   - Assess range of motion  - Assess patient's mobility; develop plan if impaired  - Assess patient's need for assistive devices and provide as appropriate  - Encourage maximum independence but intervene and supervise when necessary  - Involve family in performance of ADLs  - Assess for home care needs following discharge   - Consider OT consult to assist with ADL evaluation and planning for discharge  - Provide patient education as appropriate  Outcome: Progressing  Goal: Maintains/Returns to pre admission functional level  Description: INTERVENTIONS:  - Perform BMAT or MOVE assessment daily.   - Set and communicate daily mobility goal to care team and patient/family/caregiver. - Collaborate with rehabilitation services on mobility goals if consulted  - Perform Range of Motion imes a day. - Reposition patient every  hours.   - Dangle patient  times a day  - Stand patient  times a day  - Ambulate patient  times a day  - Out of bed to chair imes a day   - Out of bed for meals  times a day  - Out of bed for toileting  - Record patient progress and toleration of activity level   Outcome: Progressing     Problem: DISCHARGE PLANNING  Goal: Discharge to home or other facility with appropriate resources  Description: INTERVENTIONS:  - Identify barriers to discharge w/patient and caregiver  - Arrange for needed discharge resources and transportation as appropriate  - Identify discharge learning needs (meds, wound care, etc.)  - Arrange for interpretive services to assist at discharge as needed  - Refer to Case Management Department for coordinating discharge planning if the patient needs post-hospital services based on physician/advanced practitioner order or complex needs related to functional status, cognitive ability, or social support system  Outcome: Progressing     Problem: Knowledge Deficit  Goal: Patient/family/caregiver demonstrates understanding of disease process, treatment plan, medications, and discharge instructions  Description: Complete learning assessment and assess knowledge base.   Interventions:  - Provide teaching at level of understanding  - Provide teaching via preferred learning methods  Outcome: Progressing     Problem: Prexisting or High Potential for Compromised Skin Integrity  Goal: Skin integrity is maintained or improved  Description: INTERVENTIONS:  - Identify patients at risk for skin breakdown  - Assess and monitor skin integrity  - Assess and monitor nutrition and hydration status  - Monitor labs   - Assess for incontinence   - Turn and reposition patient  - Assist with mobility/ambulation  - Relieve pressure over bony prominences  - Avoid friction and shearing  - Provide appropriate hygiene as needed including keeping skin clean and dry  - Evaluate need for skin moisturizer/barrier cream  - Collaborate with interdisciplinary team   - Patient/family teaching  - Consider wound care consult   Outcome: Progressing     Problem: Nutrition/Hydration-ADULT  Goal: Nutrient/Hydration intake appropriate for improving, restoring or maintaining nutritional needs  Description: Monitor and assess patient's nutrition/hydration status for malnutrition. Collaborate with interdisciplinary team and initiate plan and interventions as ordered. Monitor patient's weight and dietary intake as ordered or per policy. Utilize nutrition screening tool and intervene as necessary. Determine patient's food preferences and provide high-protein, high-caloric foods as appropriate.      INTERVENTIONS:  - Monitor oral intake, urinary output, labs, and treatment plans  - Assess nutrition and hydration status and recommend course of action  - Evaluate amount of meals eaten  - Assist patient with eating if necessary   - Allow adequate time for meals  - Recommend/ encourage appropriate diets, oral nutritional supplements, and vitamin/mineral supplements  - Order, calculate, and assess calorie counts as needed  - Recommend, monitor, and adjust tube feedings and TPN/PPN based on assessed needs  - Assess need for intravenous fluids  - Provide specific nutrition/hydration education as appropriate  - Include patient/family/caregiver in decisions related to nutrition  Outcome: Progressing

## 2023-10-31 NOTE — OCCUPATIONAL THERAPY NOTE
Occupational Therapy Progress Note     Patient Name: Anabell Veliz  Today's Date: 10/31/2023  Problem List  Principal Problem:    Gangrene ([address])  Active Problems:    Type 2 diabetes mellitus with diabetic neuropathy, with long-term current use of insulin (HCC)    Hyperlipidemia    PAD (peripheral artery disease) ([facility])    S/P BKA (below knee amputation), left (HCC)    Orthostatic hypotension    Diabetic gastroparesis     Chronic heart failure with preserved ejection fraction ([facility])    S/P CABG x 3    Elevated troponin    Sepsis ([address])    Bacteremia due to Proteus species    Anemia          10/31/23 1506   OT Last Visit   OT Visit Date 10/31/23   Note Type   Note Type Treatment   Pain Assessment   Pain Assessment Tool 0-10   Pain Score No Pain   Restrictions/Precautions   Weight Bearing Precautions Per Order Yes   RLE Weight Bearing Per Order NWB  (R AKA)   Braces or Orthoses Other (Comment)  (L LE prosthesis)   Other Precautions Fall Risk  (Wound vac)   ADL   Where Assessed Chair   Grooming Assistance 5  Supervision/Setup   Grooming Deficit Setup; Increased time to complete;Supervision/safety   LB Dressing Assistance 3  Moderate Assistance   LB Dressing Deficit Setup;Verbal cueing;Supervision/safety; Increased time to complete; Thread RLE into underwear;Pull up over hips   Bed Mobility   Supine to Sit Unable to assess   Sit to Supine Unable to assess   Additional Comments Pt seated OOB in chair at end of session. Call bell and phone within reach. All needs met and pt reports no further questions for OT at this time. Transfers   Sliding Board transfer   (Max A of 2 from W/C > EOB, Mod A of 2 from EOB > W/C)   Additional items Assist x 2; Increased time required;Verbal cues   Other   (Mod-Min A of 2)   Additional items Assist x 2; Increased time required;Verbal cues  (Min A of 2 for foward scooting recliner > EOB.  Mod A of 2 for backward scooting EOB > Recliner)   Additional Comments 3rd person present throughout all transitions per pt's request.   Cognition   Overall Cognitive Status [facility]   Arousal/Participation Alert   Attention Attends with cues to redirect   Orientation Level Oriented X4   Memory Within functional limits   Following Commands Follows multistep commands without difficulty   Activity Tolerance   Activity Tolerance Patient tolerated treatment well;Patient limited by fatigue   Medical Staff Made Aware Domenic Vazquezjana; Bard Escalera, restorative technician   Assessment   Assessment Pt seen for OT treatment session focusing on functional activity tolerance, ADLs, functional transfers/mobility, and Safe transfer techniques. Pt alert and cooperative throughout session. Pt  seated OOB in recliner  at start of session. Forward scooting from recliner > EOB completed w/ Min A of 2 with pt using B/L UEs on armrests to clear buttocks from chair/EOB surface. Sliding board transfer completed from EOB<>W/C with Max-Mod A of 2 with assist for trunk support and steadying during transition. Pt required Total A for placement/removal of sliding board. Pt required increased assist for sliding board transfer from W/C>EOB 2* EOB surface higher than W/C surface. Backward scooting completed w/ Mod A of 2 from EOB>Recliner with assist for weight shifting throughout transitions. 3rd person present throughout all transitions per pt's request. ADL tasks completed while seated OOB in chair. Grooming tasks completed w/ Supervision w/ setup required and increased time to complete. LB Dressing completed w/ Mod A 2* assist to thread R LE into underwear and assist to simulate threading underwear over hips. Pt seated OOB in chair at end of session. Call bell and phone within reach. All needs met and pt reports no further questions for OT at this time. Continue to recommend STR when medically cleared. OT to follow pt on caseload. Plan   Treatment Interventions ADL retraining;Functional transfer training; Endurance training;Patient/family training;Equipment evaluation/education; Compensatory technique education;Continued evaluation; Activityengagement   Goal Expiration Date 11/09/23   OT Treatment Day 2   OT Frequency 3-5x/wk   Discharge Recommendation   Rehab Resource Intensity Level, OT I (Maximum Resource Intensity)   Additional Comments  The patient's raw score on the AM-PAC Daily Activity Inpatient Short Form is 17. A raw score of less than 19 suggests the patient may benefit from discharge to post-acute rehabilitation services. Please refer to the recommendation of the Occupational Therapist for safe discharge planning.    AM-PAC Daily Activity Inpatient   Lower Body Dressing 2   Bathing 2   Toileting 2   Upper Body Dressing 3   Grooming 4   Eating 4   Daily Activity Raw Score 17   Daily Activity Standardized Score (Calc for Raw Score >=11) 37.26   AM-PAC Applied Cognition Inpatient   Following a Speech/Presentation 4   Understanding Ordinary Conversation 4   Taking Medications 3   Remembering Where Things Are Placed or Put Away 4   Remembering List of 4-5 Errands 4   Taking Care of Complicated Tasks 4   Applied Cognition Raw Score 23   Applied Cognition Standardized Score 53.08            Palmer Fernandez, OTR/L

## 2023-11-01 VITALS
BODY MASS INDEX: 29.98 KG/M2 | DIASTOLIC BLOOD PRESSURE: 51 MMHG | SYSTOLIC BLOOD PRESSURE: 96 MMHG | OXYGEN SATURATION: 96 % | TEMPERATURE: 97.5 F | RESPIRATION RATE: 18 BRPM | HEART RATE: 66 BPM | WEIGHT: 226.19 LBS | HEIGHT: 73 IN

## 2023-11-01 LAB
GLUCOSE SERPL-MCNC: 162 MG/DL (ref 65–140)
GLUCOSE SERPL-MCNC: 172 MG/DL (ref 65–140)

## 2023-11-01 PROCEDURE — 99024 POSTOP FOLLOW-UP VISIT: CPT | Performed by: NURSE PRACTITIONER

## 2023-11-01 PROCEDURE — 99239 HOSP IP/OBS DSCHRG MGMT >30: CPT | Performed by: STUDENT IN AN ORGANIZED HEALTH CARE EDUCATION/TRAINING PROGRAM

## 2023-11-01 PROCEDURE — 82948 REAGENT STRIP/BLOOD GLUCOSE: CPT

## 2023-11-01 RX ORDER — OXYCODONE HYDROCHLORIDE 5 MG/1
5 TABLET ORAL EVERY 8 HOURS PRN
Qty: 15 TABLET | Refills: 0 | Status: SHIPPED | OUTPATIENT
Start: 2023-11-01 | End: 2023-11-06

## 2023-11-01 RX ORDER — LIDOCAINE 40 MG/G
CREAM TOPICAL DAILY PRN
Status: DISCONTINUED | OUTPATIENT
Start: 2023-11-01 | End: 2023-11-01 | Stop reason: HOSPADM

## 2023-11-01 RX ADMIN — PANTOPRAZOLE SODIUM 40 MG: 40 TABLET, DELAYED RELEASE ORAL at 06:28

## 2023-11-01 RX ADMIN — ENOXAPARIN SODIUM 40 MG: 40 INJECTION SUBCUTANEOUS at 09:06

## 2023-11-01 RX ADMIN — CLOPIDOGREL BISULFATE 75 MG: 75 TABLET ORAL at 09:06

## 2023-11-01 RX ADMIN — INSULIN LISPRO 1 UNITS: 100 INJECTION, SOLUTION INTRAVENOUS; SUBCUTANEOUS at 09:07

## 2023-11-01 RX ADMIN — FINASTERIDE 5 MG: 5 TABLET, FILM COATED ORAL at 09:06

## 2023-11-01 RX ADMIN — ASPIRIN 81 MG: 81 TABLET, COATED ORAL at 09:06

## 2023-11-01 RX ADMIN — INSULIN LISPRO 1 UNITS: 100 INJECTION, SOLUTION INTRAVENOUS; SUBCUTANEOUS at 12:07

## 2023-11-01 NOTE — PLAN OF CARE
Problem: Potential for Falls  Goal: Patient will remain free of falls  Description: INTERVENTIONS:  - Educate patient/family on patient safety including physical limitations  - Instruct patient to call for assistance with activity   - Consult OT/PT to assist with strengthening/mobility   - Keep Call bell within reach  - Keep bed low and locked with side rails adjusted as appropriate  - Keep care items and personal belongings within reach  - Initiate and maintain comfort rounds  - Make Fall Risk Sign visible to staff  - Offer Toileting every 2 Hours, in advance of need  - Apply yellow socks and bracelet for high fall risk patients  - Consider moving patient to room near nurses station  Outcome: Progressing     Problem: MOBILITY - ADULT  Goal: Maintain or return to baseline ADL function  Description: INTERVENTIONS:  -  Assess patient's ability to carry out ADLs; assess patient's baseline for ADL function and identify physical deficits which impact ability to perform ADLs (bathing, care of mouth/teeth, toileting, grooming, dressing, etc.)  - Assess/evaluate cause of self-care deficits   - Assess range of motion  - Assess patient's mobility; develop plan if impaired  - Assess patient's need for assistive devices and provide as appropriate  - Encourage maximum independence but intervene and supervise when necessary  - Involve family in performance of ADLs  - Assess for home care needs following discharge   - Consider OT consult to assist with ADL evaluation and planning for discharge  - Provide patient education as appropriate  Outcome: Progressing  Goal: Maintains/Returns to pre admission functional level  Description: INTERVENTIONS:  - Perform BMAT or MOVE assessment daily.   - Set and communicate daily mobility goal to care team and patient/family/caregiver. - Collaborate with rehabilitation services on mobility goals if consulted  - Perform Range of Motion 4 times a day. - Reposition patient every 2 hours.   - Dangle patient 4 times a day  - Stand patient 4 times a day  - Ambulate patient 4 times a day  - Out of bed to chair 4 times a day   - Out of bed for meals 4 times a day  - Out of bed for toileting  - Record patient progress and toleration of activity level   Outcome: Progressing     Problem: PAIN - ADULT  Goal: Verbalizes/displays adequate comfort level or baseline comfort level  Description: Interventions:  - Encourage patient to monitor pain and request assistance  - Assess pain using appropriate pain scale  - Administer analgesics based on type and severity of pain and evaluate response  - Implement non-pharmacological measures as appropriate and evaluate response  - Consider cultural and social influences on pain and pain management  - Notify physician/advanced practitioner if interventions unsuccessful or patient reports new pain  Outcome: Progressing     Problem: INFECTION - ADULT  Goal: Absence or prevention of progression during hospitalization  Description: INTERVENTIONS:  - Assess and monitor for signs and symptoms of infection  - Monitor lab/diagnostic results  - Monitor all insertion sites, i.e. indwelling lines, tubes, and drains  - Monitor endotracheal if appropriate and nasal secretions for changes in amount and color  - [place] appropriate cooling/warming therapies per order  - Administer medications as ordered  - Instruct and encourage patient and family to use good hand hygiene technique  - Identify and instruct in appropriate isolation precautions for identified infection/condition  Outcome: Progressing     Problem: SAFETY ADULT  Goal: Patient will remain free of falls  Description: INTERVENTIONS:  - Educate patient/family on patient safety including physical limitations  - Instruct patient to call for assistance with activity   - Consult OT/PT to assist with strengthening/mobility   - Keep Call bell within reach  - Keep bed low and locked with side rails adjusted as appropriate  - Keep care items and personal belongings within reach  - Initiate and maintain comfort rounds  - Make Fall Risk Sign visible to staff  - Offer Toileting every 2 Hours, in advance of need  - Initiate/Maintain bed alarm  - Apply yellow socks and bracelet for high fall risk patients  - Consider moving patient to room near nurses station  Outcome: Progressing  Goal: Maintain or return to baseline ADL function  Description: INTERVENTIONS:  -  Assess patient's ability to carry out ADLs; assess patient's baseline for ADL function and identify physical deficits which impact ability to perform ADLs (bathing, care of mouth/teeth, toileting, grooming, dressing, etc.)  - Assess/evaluate cause of self-care deficits   - Assess range of motion  - Assess patient's mobility; develop plan if impaired  - Assess patient's need for assistive devices and provide as appropriate  - Encourage maximum independence but intervene and supervise when necessary  - Involve family in performance of ADLs  - Assess for home care needs following discharge   - Consider OT consult to assist with ADL evaluation and planning for discharge  - Provide patient education as appropriate  Outcome: Progressing  Goal: Maintains/Returns to pre admission functional level  Description: INTERVENTIONS:  - Perform BMAT or MOVE assessment daily.   - Set and communicate daily mobility goal to care team and patient/family/caregiver. - Collaborate with rehabilitation services on mobility goals if consulted  - Perform Range of Motion 4 times a day. - Reposition patient every 2 hours.   - Dangle patient 4 times a day  - Stand patient 4 times a day  - Ambulate patient 4 times a day  - Out of bed to chair 4 times a day   - Out of bed for meals 4 times a day  - Out of bed for toileting  - Record patient progress and toleration of activity level   Outcome: Progressing     Problem: DISCHARGE PLANNING  Goal: Discharge to home or other facility with appropriate resources  Description: INTERVENTIONS:  - Identify barriers to discharge w/patient and caregiver  - Arrange for needed discharge resources and transportation as appropriate  - Identify discharge learning needs (meds, wound care, etc.)  - Arrange for interpretive services to assist at discharge as needed  - Refer to Case Management Department for coordinating discharge planning if the patient needs post-hospital services based on physician/advanced practitioner order or complex needs related to functional status, cognitive ability, or social support system  Outcome: Progressing     Problem: Knowledge Deficit  Goal: Patient/family/caregiver demonstrates understanding of disease process, treatment plan, medications, and discharge instructions  Description: Complete learning assessment and assess knowledge base. Interventions:  - Provide teaching at level of understanding  - Provide teaching via preferred learning methods  Outcome: Progressing     Problem: Prexisting or High Potential for Compromised Skin Integrity  Goal: Skin integrity is maintained or improved  Description: INTERVENTIONS:  - Identify patients at risk for skin breakdown  - Assess and monitor skin integrity  - Assess and monitor nutrition and hydration status  - Monitor labs   - Assess for incontinence   - Turn and reposition patient  - Assist with mobility/ambulation  - Relieve pressure over bony prominences  - Avoid friction and shearing  - Provide appropriate hygiene as needed including keeping skin clean and dry  - Evaluate need for skin moisturizer/barrier cream  - Collaborate with interdisciplinary team   - Patient/family teaching  - Consider wound care consult   Outcome: Progressing     Problem: Nutrition/Hydration-ADULT  Goal: Nutrient/Hydration intake appropriate for improving, restoring or maintaining nutritional needs  Description: Monitor and assess patient's nutrition/hydration status for malnutrition.  Collaborate with interdisciplinary team and initiate plan and interventions as ordered. Monitor patient's weight and dietary intake as ordered or per policy. Utilize nutrition screening tool and intervene as necessary. Determine patient's food preferences and provide high-protein, high-caloric foods as appropriate.      INTERVENTIONS:  - Monitor oral intake, urinary output, labs, and treatment plans  - Assess nutrition and hydration status and recommend course of action  - Evaluate amount of meals eaten  - Assist patient with eating if necessary   - Allow adequate time for meals  - Recommend/ encourage appropriate diets, oral nutritional supplements, and vitamin/mineral supplements  - Order, calculate, and assess calorie counts as needed  - Recommend, monitor, and adjust tube feedings and TPN/PPN based on assessed needs  - Assess need for intravenous fluids  - Provide specific nutrition/hydration education as appropriate  - Include patient/family/caregiver in decisions related to nutrition  Outcome: Progressing

## 2023-11-01 NOTE — CASE MANAGEMENT
Case Management Discharge Planning Note    Patient name Chapincito Green  Location [address] [place] [address] [id number] [place] [address] [place] 456/E4 MS 26-* MRN [id number]  : 1960 Date 2023       Current Admission Date: 10/19/2023  Current Admission Diagnosis:Gangrene [facility])   Patient Active Problem List    Diagnosis Date Noted    Anemia 10/26/2023    Gangrene ([address]) 10/23/2023    Sepsis ([address]) 10/20/2023    Bacteremia due to Proteus species 10/20/2023    Elevated troponin 10/19/2023    Loose stools 2023    Diabetic ulcer of right heel ([address]) 2023    Lymphedema in adult patient 2023    Cellulitis of right ankle 2023    Non-pressure chronic ulcer of right calf with fat layer exposed ([address]) 2023    Embolism and thrombosis of arteries of the lower extremities ([address]) 10/29/2020    Lymphedema of right lower extremity 10/29/2020    Venous stasis dermatitis of right lower extremity 10/29/2020    Abdominal distension 10/29/2020    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    Nodular rash 2020    CAD (coronary artery disease) 2019    Phantom limb syndrome without pain ([address]) 10/04/2018    Obstructive sleep apnea 2018    Hyponatremia 2018    Diabetic autonomic neuropathy associated with type 2 diabetes mellitus ([address]) 2018    S/P CABG x 3 2018    Chronic heart failure with preserved ejection fraction ([address]) 2018    Hypertensive heart disease with congestive heart failure ([address])     Triple vessel coronary artery disease 2018    Diabetic gastroparesis  2018    Class 2 severe obesity due to excess calories with serious comorbidity and body mass index (BMI) of 35.0 to 35.9 in adult  2018    Orthostatic hypotension 2018    PAD (peripheral artery disease) ([address]) 2018    S/P BKA (below knee amputation), left ([address]) 2018    Anxiety and depression 2017    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 2017    Vitamin D deficiency 09/20/2016    Hyperlipidemia 02/11/2015    Type 2 diabetes mellitus with diabetic neuropathy, with long-term current use of insulin ([address]) 11/06/2014      LOS (days): 13  Geometric Mean LOS (GMLOS) (days): 9.90  Days to GMLOS:-2.9     OBJECTIVE:  Risk of Unplanned Readmission Score: 22.1         Current admission status: Inpatient   Preferred Pharmacy:   [address], [address]  [facility] [address]  Phone: [phone] Fax: [phone]    [facility] ([address] [facility]  [address] [facility]  [address] [facility] [address]  Phone: [phone] Fax: [facility], [address] [facility] [address] [address]  [address] [facility]  Phone: [phone] Fax: [phone]    Primary Care Provider: Fabienne Zayas DO    Primary Insurance: [facility]  Secondary Insurance:     DISCHARGE DETAILS:    Discharge planning discussed with[de-identified] Patient  Freedom of Choice: No (patient alert and oriented)        Were Treatment Team discharge recommendations reviewed with patient/caregiver?: Yes  Did patient/caregiver verbalize understanding of patient care needs?: Yes  Were patient/caregiver advised of the risks associated with not following Treatment Team discharge recommendations?: Yes    Contacts  Patient Contacts: Ruy Richey (Spouse)  [phone] Eran Langston)  Relationship to Patient[de-identified] Family  Contact Method: Phone  Phone Number: Ruy Richey (Spouse)  [phone] Eran Langston)          Discharge Destination Plan[de-identified] Short Term Rehab  Transport at Discharge : BLS Ambulance  Dispatcher Contacted: Yes  Number/Name of Dispatcher: Round Trip  Transported by [employer] and Unit #):  SLETS  ETA of Transport (Date): 11/01/23  ETA of Transport (Time): 1330     Transfer Mode: Stretcher  Accompanied by: EMS personnel     IMM Given (Date):: 11/01/23  IMM Given to[de-identified] Patient     Additional Comments: Cm received approval for patient to go to [facility]. CM arranged transport via BLS and let patient know. He is agreeable to discharge.     Accepting Facility Name, [address] [place] [address] : [facility]  Receiving Facility/Agency Phone Number: [phone]  Facility/Agency Fax Number: [phone]

## 2023-11-01 NOTE — ASSESSMENT & PLAN NOTE
Lab Results   Component Value Date    HGBA1C 6.7 (A) 02/02/2023       Recent Labs     10/31/23  1038 10/31/23  1633 11/01/23  0758 11/01/23  1154   POCGLU 188* 155* 172* 162*     Patient with poor p.o. intake and borderline blood sugars  decreased regimen to avoid hypoglycemia  Continue home regimen is Lantus 40 units twice daily

## 2023-11-01 NOTE — ASSESSMENT & PLAN NOTE
59 yo male former smoker (quit 1994) with a hx of DM II, CAD S/P CABG w/ R GSV, L BKA (by Dr. Zaida Li 8/3/18 for tissue loss), PAD and chronic R heel ulcer presented to [facility] on 10/19/23 w/ weakness, chills, nausea and vomiting. Pt admitted w/ a worsening R foot wound and proteus bacteremia, likely secondary to foot wound. Per podiatry, limb is unsalvageable, and vascular surgery was consulted for higher level amputation. Pt is S/P R AKA on 10/25 w/ uneventful post-op course. Plan:  -Nonsalvageable RLE secondary to infected heal wound, per podiatry  -S/P R AKA on 10/25  -Rosalynd Bare vac removed today (11/1)  -R AKA incision well-approx, staples and sutures intact, no erythema or drainage  -Betadine, 4x4, kerlix and ACE wrap to surgical incision daily  -Continue ASA, plavix and lipitor  -Continue medical management per primary team  -Continue PT/OT  -Pt is okay for discharge from a vascular standpoint  -Case management following for dispo planning; will need rehab w/ transition to LTC.   -Will discuss w/ Dr. Daysi Ramesh

## 2023-11-01 NOTE — DISCHARGE SUMMARY
[address] [facility]  Discharge- Arielle Irwin 1960, 58 y.o. male MRN: [id number]  Unit/Bed#: E4 -01 Encounter: [id number]  Primary Care Provider: Burnetta Gottron, DO   Date and time admitted to hospital: 10/19/2023 12:34 PM    Bacteremia due to Proteus species  Assessment & Plan  Patient presented with bacteremia with Proteus and Bacteroides   Secondary to right foot wound with gangrene  Completed course of antibiotics per ID, with 7d of ceftriaxone and Flagyl    S/P CABG x 3  Assessment & Plan  History of CAD status post CABG  Continue on aspirin, Plavix, statin  Elevated troponin on admission attributed to non-MI troponin elevation secondary to sepsis  Evaluated by the cardiology team, no evidence of ischemia    Chronic heart failure with preserved ejection fraction [facility])  Assessment & Plan  Wt Readings from Last 3 Encounters:   10/26/23 103 kg (226 lb 3.1 oz)   09/02/23 118 kg (259 lb 7.7 oz)   07/31/23 118 kg (260 lb)     Patient has a history of chronic diastolic congestive heart failure  Most recent 2D echocardiogram 10/22: Left ventricular ejection fraction 61%. Grade 1 diastolic dysfunction.   Patient was placed on IV Lasix due to lower extremity edema  Lungs clear to auscultation  transitioned back to home Lasix 40 mg p.o. twice daily  Currently euvolemic     Diabetic gastroparesis   Assessment & Plan  patient with previous retching after admission:  resolved after trial of scheduled reglan  Possibly secondary to gastroparesis, versus intolerance to antibiotics especially Flagyl  Cont reglan prn    S/P BKA (below knee amputation), left (HCC)  Assessment & Plan  With prosthesis in place    PAD (peripheral artery disease) ([address])  Assessment & Plan  Patient follows with vascular surgery outpatient  LEADS: "Diffuse atherosclerotic arterial disease noted, with a 50-75% stenosis in the  proximal superficial femoral artery and a 50-75% stenosis in the proximal superficial femoral artery"  vascular surgery performed AKA 10/25  Continue aspirin, Plavix, statin      Hyperlipidemia  Assessment & Plan  Continue Lipitor 80 mg daily    Type 2 diabetes mellitus with diabetic neuropathy, with long-term current use of insulin [facility])  Assessment & Plan  Lab Results   Component Value Date    HGBA1C 6.7 (A) 02/02/2023       Recent Labs     10/31/23  1038 10/31/23  1633 11/01/23  0758 11/01/23  1154   POCGLU 188* 155* 172* 162*     Patient with poor p.o. intake and borderline blood sugars  decreased regimen to avoid hypoglycemia  Continue home regimen is Lantus 40 units twice daily    * Gangrene [facility])  Assessment & Plan  Patient presented with gangrene of the right heel  Had gotten progressively worse since his previous admission 8/2023  Patient was evaluated by podiatry and vascular surgery: Foot deemed nonsalvageable  AKA performed by vascular surgery 10/25  VAC in place until 10/30, removed per Vascular  Cont pain medications  Discharged to Rehab        Discharging Physician / Practitioner: Radha King MD  PCP: Malissa Mauricio DO  Admission Date:   Admission Orders (From admission, onward)       Ordered        10/19/23 1632  INPATIENT ADMISSION  Once                          Discharge Date: 11/01/23    Medical Problems       Resolved Problems  Date Reviewed: 11/1/2023            Resolved    Respiratory insufficiency 10/23/2023     Resolved by  Mason Hudson MD          Consultations During Hospital Stay:  Vascular  Podiatry  Cardiology  Infectious Disease    Procedures Performed:   Right AKA 10/25    Significant Findings / Test Results:   CT lower extremity wo contrast right    Result Date: 10/19/2023  Impression: Severe diffuse subcutaneous edema without subcutaneous gas. Limited evaluation for abscess without IV contrast; no apparent discrete collection. No CT signs of osteomyelitis. The study was marked in [facility] for immediate notification.  Workstation performed: [id number]     XR chest 2 views    Result Date: 10/19/2023  Impression: No acute cardiopulmonary disease. Workstation performed: [id number]     XR foot 3+ views RIGHT    Result Date: 10/19/2023  Impression: No acute osseous abnormality. Large ulcer overlying the plantar aspect of the calcaneus which appears slightly larger than on the prior study. Resident: Pauline Myers, the attending radiologist, have reviewed the images and agree with the final report above. Workstation performed: [id number]        Incidental Findings:   none     Test Results Pending at Discharge (will require follow up):   none     Outpatient Tests Requested:  none    Complications:  none    Reason for Admission: Right Foot Wound    Hospital Course:     Melissa Clements is a 58 y.o. male patient who originally presented to the hospital on 10/19/2023 due to right foot wound/ulcer. Patient was evaluated podiatry and vascular. Started on IV antibiotics, blood cultures grew Proteus and infectious disease recommended 7-day course with IV antibiotics which patient completed while inpatient. Patient's right foot was deemed nonsalvageable, and vascular team recommended AKA. Cardiology evaluated for preop clearance. Patient underwent AKA on 10/25 with wound VAC. Cleared for discharge on 10/30 by vascular. And discharged to rehab at [facility], may require long-term care. Please see above list of diagnoses and related plan for additional information. Condition at Discharge: fair     Discharge Day Visit / Exam:     Subjective:    Patient reports doing well today, tolerating diet. Denies any fevers, chills, chest pain or shortness of breath. Currently controlled on oxycodone.   Vitals: Blood Pressure: 96/51 (11/01/23 0756)  Pulse: 66 (11/01/23 0756)  Temperature: 97.5 °F (36.4 °C) (11/01/23 0756)  Temp Source: Temporal (11/01/23 0756)  Respirations: 18 (11/01/23 0756)  Height: 6' 1" (185.4 cm) (10/22/23 1547)  Weight - Scale: 103 kg (226 lb 3.1 oz) (10/26/23 0600)  SpO2: 96 % (11/01/23 0756)  Exam:   Physical Exam  Vitals reviewed. Constitutional:       Appearance: He is obese. HENT:      Head: Normocephalic and atraumatic. Eyes:      General: No scleral icterus. Conjunctiva/sclera: Conjunctivae normal.   Cardiovascular:      Rate and Rhythm: Normal rate. Heart sounds: No murmur heard. Pulmonary:      Effort: Pulmonary effort is normal. No respiratory distress. Abdominal:      General: There is no distension. Musculoskeletal:         General: Deformity (Right AKA, Left BKA) present. Comments: Left bka   Neurological:      Mental Status: He is oriented to person, place, and time. Psychiatric:      Comments: Mood is depressed       Discharge instructions/Information to patient and family:   See after visit summary for information provided to patient and family. Provisions for Follow-Up Care:  See after visit summary for information related to follow-up care and any pertinent home health orders. Disposition:     Acute Rehab at [address] [place] [address]     Discharge Statement:  I spent 40 minutes discharging the patient. This time was spent on the day of discharge. I had direct contact with the patient on the day of discharge. Greater than 50% of the total time was spent examining patient, answering all patient questions, arranging and discussing plan of care with patient as well as directly providing post-discharge instructions. Additional time then spent on discharge activities. Discharge Medications:  See after visit summary for reconciled discharge medications provided to patient and family.       ** Please Note: This note has been constructed using a voice recognition system **

## 2023-11-01 NOTE — PROGRESS NOTES
[address] [facility]  Progress Note  Name: Orquidea Leiva  MRN: [id number]  Unit/Bed#: E4 -01 I Date of Admission: 10/19/2023   Date of Service: 11/1/2023 I Hospital Day: 15    Assessment/Plan   PAD (peripheral artery disease) [facility])  Assessment & Plan  59 yo male former smoker (quit 1994) with a hx of DM II, CAD S/P CABG w/ R GSV, L BKA (by Dr. Gato Valle 8/3/18 for tissue loss), PAD and chronic R heel ulcer presented to [facility] on 10/19/23 w/ weakness, chills, nausea and vomiting. Pt admitted w/ a worsening R foot wound and proteus bacteremia, likely secondary to foot wound. Per podiatry, limb is unsalvageable, and vascular surgery was consulted for higher level amputation. Pt is S/P R AKA on 10/25 w/ uneventful post-op course. Plan:  -Nonsalvageable RLE secondary to infected heal wound, per podiatry  -S/P R AKA on 10/25  -H[place]án [place] vac removed today (11/1)  -R AKA incision well-approx, staples and sutures intact, no erythema or drainage  -Betadine, 4x4, kerlix and ACE wrap to surgical incision daily  -Continue ASA, plavix and lipitor  -Continue medical management per primary team  -Continue PT/OT  -Pt is okay for discharge from a vascular standpoint  -Case management following for dispo planning; will need rehab w/ transition to LTC. -Will discuss w/ Dr. Ellen Orantes      Subjective:  Pt seen for exam while sitting in his recliner chair; NAD. Pt denies any R stump pain. Prevena vac removed from R AKA incision. Incision is well-approx, no swelling, erythema or drainage w/ sutures and staples intact. Incision painted w/ betadine and wrapped w/ 4x4, kerlix and ACE wrap. Pt denies any complaints at this time. Vitals:  BP 96/51 (BP Location: Right arm)   Pulse 66   Temp 97.5 °F (36.4 °C) (Temporal)   Resp 18   Ht 6' 1" (1.854 m)   Wt 103 kg (226 lb 3.1 oz)   SpO2 96%   BMI 29.84 kg/m²     I/Os:  I/O last 3 completed shifts:   In: 240 [P.O.:240]  Out: 1400 [Urine:1400]  No intake/output data recorded.     Lab Results and Cultures:   Lab Results   Component Value Date    WBC 8.25 10/29/2023    HGB 9.2 (L) 10/29/2023    HCT 29.8 (L) 10/29/2023    MCV 87 10/29/2023     10/29/2023     Lab Results   Component Value Date    GLUCOSE 165 (H) 03/28/2018    CALCIUM 8.3 (L) 10/29/2023     09/13/2016    K 3.8 10/29/2023    CO2 34 (H) 10/29/2023    CL 98 10/29/2023    BUN 14 10/29/2023    CREATININE 0.70 10/29/2023     Lab Results   Component Value Date    INR 1.08 10/19/2023    INR 1.08 08/29/2023    INR 0.99 08/29/2021    PROTIME 14.0 10/19/2023    PROTIME 14.0 08/29/2023    PROTIME 12.9 08/29/2021        Blood Culture:   Lab Results   Component Value Date    BLOODCX Proteus mirabilis (A) 10/19/2023    BLOODCX Bacteroides thetaiotaomicron group (A) 10/19/2023   ,   Urinalysis:   Lab Results   Component Value Date    COLORU Light Yellow 10/26/2023    CLARITYU Clear 10/26/2023    SPECGRAV 1.013 10/26/2023    PHUR 5.5 10/26/2023    PHUR 7.5 05/23/2018    LEUKOCYTESUR Negative 10/26/2023    NITRITE Negative 10/26/2023    GLUCOSEU Negative 10/26/2023    KETONESU 10 (1+) (A) 10/26/2023    BILIRUBINUR Negative 10/26/2023    BLOODU Trace (A) 10/26/2023       Medications:  Current Facility-Administered Medications   Medication Dose Route Frequency    acetaminophen (TYLENOL) tablet 650 mg  650 mg Oral Q6H PRN    ALPRAZolam (XANAX) tablet 0.25 mg  0.25 mg Oral BID PRN    aspirin (ECOTRIN LOW STRENGTH) EC tablet 81 mg  81 mg Oral Daily    atorvastatin (LIPITOR) tablet 80 mg  80 mg Oral Daily With Dinner    clopidogrel (PLAVIX) tablet 75 mg  75 mg Oral Daily    docusate sodium (COLACE) capsule 100 mg  100 mg Oral BID    enoxaparin (LOVENOX) subcutaneous injection 40 mg  40 mg Subcutaneous Daily    finasteride (PROSCAR) tablet 5 mg  5 mg Oral Daily    furosemide (LASIX) tablet 40 mg  40 mg Oral BID (diuretic)    insulin lispro (HumaLOG) 100 units/mL subcutaneous injection 1-5 Units  1-5 Units Subcutaneous TID AC magnesium hydroxide (MILK OF MAGNESIA) oral suspension 30 mL  30 mL Oral Daily PRN    melatonin tablet 6 mg  6 mg Oral HS    metoclopramide (REGLAN) injection 5 mg  5 mg Intravenous Q6H PRN    morphine injection 2 mg  2 mg Intravenous Q4H PRN    ondansetron (ZOFRAN) injection 4 mg  4 mg Intravenous Q4H PRN    oxyCODONE (ROXICODONE) immediate release tablet 10 mg  10 mg Oral Q4H PRN    oxyCODONE (ROXICODONE) IR tablet 5 mg  5 mg Oral Q4H PRN    pantoprazole (PROTONIX) EC tablet 40 mg  40 mg Oral Early Morning    senna (SENOKOT) tablet 8.6 mg  1 tablet Oral HS    tamsulosin (FLOMAX) capsule 0.4 mg  0.4 mg Oral Daily With Dinner       Physical Exam:    General appearance: alert and oriented, in no acute distress  Skin: Skin color, texture, turgor normal. No rashes or lesions  Neurologic: Grossly normal  Head: Normocephalic, without obvious abnormality, atraumatic  Lungs: clear to auscultation bilaterally  Heart: regular rate and rhythm  Abdomen: soft, non-tender; bowel sounds normal; no masses,  no organomegaly  Extremities:  extremities warm and well-perfused; L BKA, R AKA    Wound/Incision:  R AKA incision is well-approx, no swelling, erythema or drainage w/ sutures and staples intact. Incision painted w/ betadine and wrapped w/ 4x4, kerlix and ACE wrap.     Pulse exam:  Radial: Right: 2+ Left[de-identified] 2+      CARLTON Retana  11/1/2023

## 2023-11-01 NOTE — PLAN OF CARE
Problem: Potential for Falls  Goal: Patient will remain free of falls  Description: INTERVENTIONS:  - Educate patient/family on patient safety including physical limitations  - Instruct patient to call for assistance with activity   - Consult OT/PT to assist with strengthening/mobility   - Keep Call bell within reach  - Keep bed low and locked with side rails adjusted as appropriate  - Keep care items and personal belongings within reach  - Initiate and maintain comfort rounds  - Make Fall Risk Sign visible to staff  - Offer Toileting every 2 Hours, in advance of need  - Initiate/Maintain alarm  - Obtain necessary fall risk management equipment:   - Apply yellow socks and bracelet for high fall risk patients  - Consider moving patient to room near nurses station  10/31/2023 2233 by Herminia See LPN  Outcome: Progressing  10/31/2023 2233 by Herminia See LPN  Outcome: Progressing     Problem: MOBILITY - ADULT  Goal: Maintain or return to baseline ADL function  Description: INTERVENTIONS:  -  Assess patient's ability to carry out ADLs; assess patient's baseline for ADL function and identify physical deficits which impact ability to perform ADLs (bathing, care of mouth/teeth, toileting, grooming, dressing, etc.)  - Assess/evaluate cause of self-care deficits   - Assess range of motion  - Assess patient's mobility; develop plan if impaired  - Assess patient's need for assistive devices and provide as appropriate  - Encourage maximum independence but intervene and supervise when necessary  - Involve family in performance of ADLs  - Assess for home care needs following discharge   - Consider OT consult to assist with ADL evaluation and planning for discharge  - Provide patient education as appropriate  10/31/2023 2233 by Herminia See LPN  Outcome: Progressing  10/31/2023 2233 by Herminia See LPN  Outcome: Progressing  Goal: Maintains/Returns to pre admission functional level  Description: INTERVENTIONS:  - Perform BMAT or MOVE assessment daily.   - Set and communicate daily mobility goal to care team and patient/family/caregiver. - Collaborate with rehabilitation services on mobility goals if consulted  - Perform Range of Motion   3 times a day. - Reposition patient every 2 hours.   - Dangle patient 3 times a day  - Stand patient 3 times a day  - Ambulate patient 3 times a day  - Out of bed to chair 3 times a day   - Out of bed for meals 3 times a day  - Out of bed for toileting  - Record patient progress and toleration of activity level   10/31/2023 2233 by Flavio Hackett LPN  Outcome: Progressing  10/31/2023 2233 by Flavio Hackett LPN  Outcome: Progressing     Problem: PAIN - ADULT  Goal: Verbalizes/displays adequate comfort level or baseline comfort level  Description: Interventions:  - Encourage patient to monitor pain and request assistance  - Assess pain using appropriate pain scale  - Administer analgesics based on type and severity of pain and evaluate response  - Implement non-pharmacological measures as appropriate and evaluate response  - Consider cultural and social influences on pain and pain management  - Notify physician/advanced practitioner if interventions unsuccessful or patient reports new pain  10/31/2023 2233 by Flavio Hackett LPN  Outcome: Progressing  10/31/2023 2233 by Flavio Hackett LPN  Outcome: Progressing     Problem: INFECTION - ADULT  Goal: Absence or prevention of progression during hospitalization  Description: INTERVENTIONS:  - Assess and monitor for signs and symptoms of infection  - Monitor lab/diagnostic results  - Monitor all insertion sites, i.e. indwelling lines, tubes, and drains  - Monitor endotracheal if appropriate and nasal secretions for changes in amount and color  - [place] appropriate cooling/warming therapies per order  - Administer medications as ordered  - Instruct and encourage patient and family to use good hand hygiene technique  - Identify and instruct in appropriate isolation precautions for identified infection/condition  10/31/2023 2233 by Alayna Jones LPN  Outcome: Progressing  10/31/2023 2233 by Alayna Jones LPN  Outcome: Progressing     Problem: DISCHARGE PLANNING  Goal: Discharge to home or other facility with appropriate resources  Description: INTERVENTIONS:  - Identify barriers to discharge w/patient and caregiver  - Arrange for needed discharge resources and transportation as appropriate  - Identify discharge learning needs (meds, wound care, etc.)  - Arrange for interpretive services to assist at discharge as needed  - Refer to Case Management Department for coordinating discharge planning if the patient needs post-hospital services based on physician/advanced practitioner order or complex needs related to functional status, cognitive ability, or social support system  10/31/2023 2233 by Alayna Jones LPN  Outcome: Progressing  10/31/2023 2233 by Alayna Jones LPN  Outcome: Progressing

## 2023-11-01 NOTE — CASE MANAGEMENT
[address] [facility] has received approved authorization from insurance: [address] in by [address] P# [phone]  Authorization received for: [facility]  Facility: [facility] #:  [id number]  Start of Care: 11/1  Next Review Date: 11/3  Continued Stay Care Coordinator: Nghia George next review to: fax# [phone]  Care Manager notified: Thais Officer

## 2023-11-01 NOTE — CASE MANAGEMENT
Case Management Discharge Planning Note    Patient name Cecilio Albrecht  Location [address] [address] [id number] [place] [address] [place] 456/E4 MS 26-* MRN [id number]  : 1960 Date 2023       Current Admission Date: 10/19/2023  Current Admission Diagnosis:Gangrene [facility])   Patient Active Problem List    Diagnosis Date Noted    Anemia 10/26/2023    Gangrene ([address]) 10/23/2023    Sepsis ([address]) 10/20/2023    Bacteremia due to Proteus species 10/20/2023    Elevated troponin 10/19/2023    Loose stools 2023    Diabetic ulcer of right heel ([address]) 2023    Lymphedema in adult patient 2023    Cellulitis of right ankle 2023    Non-pressure chronic ulcer of right calf with fat layer exposed ([address]) 2023    Embolism and thrombosis of arteries of the lower extremities ([address]) 10/29/2020    Lymphedema of right lower extremity 10/29/2020    Venous stasis dermatitis of right lower extremity 10/29/2020    Abdominal distension 10/29/2020    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    Nodular rash 2020    CAD (coronary artery disease) 2019    Phantom limb syndrome without pain ([address]) 10/04/2018    Obstructive sleep apnea 2018    Hyponatremia 2018    Diabetic autonomic neuropathy associated with type 2 diabetes mellitus ([address]) 2018    S/P CABG x 3 2018    Chronic heart failure with preserved ejection fraction ([address]) 2018    Hypertensive heart disease with congestive heart failure ([address])     Triple vessel coronary artery disease 2018    Diabetic gastroparesis  2018    Class 2 severe obesity due to excess calories with serious comorbidity and body mass index (BMI) of 35.0 to 35.9 in adult  2018    Orthostatic hypotension 2018    PAD (peripheral artery disease) ([address]) 2018    S/P BKA (below knee amputation), left ([address]) 2018    Anxiety and depression 2017    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 2017    Vitamin D What Type Of Note Output Would You Prefer (Optional)?: Standard Output What Is The Reason For Today's Visit?: Full Body Skin Examination deficiency 09/20/2016    Hyperlipidemia 02/11/2015    Type 2 diabetes mellitus with diabetic neuropathy, with long-term current use of insulin ([address]) 11/06/2014      LOS (days): 13  Geometric Mean LOS (GMLOS) (days): 9.90  Days to GMLOS:-2.9     OBJECTIVE:  Risk of Unplanned Readmission Score: 22.05         Current admission status: Inpatient   Preferred Pharmacy:   [facility] [address] [facility], [address] [address]  Phone: [phone] Fax: [phone]    [facility] ([address] [address]  Phone: [phone] Fax: [phone] [address] [facility], [address] [facility]  [address] [facility]  Phone: [phone] Fax: [phone]    Primary Care Provider: Stefano Monroe DO    Primary Insurance: [facility] REP  Secondary Insurance:     [address] Number: [id number] (SNF) What Is The Reason For Today's Visit? (Being Monitored For X): concerning skin lesions on an annual basis

## 2023-11-01 NOTE — NURSING NOTE
AVS, 1 oxycodone script, medical necessity and face sheet  given to transport. Report called to [facility]. Patient picked up by BLS.

## 2023-11-02 ENCOUNTER — NURSING HOME VISIT (OUTPATIENT)
Dept: GERIATRICS | Facility: OTHER | Age: 63
End: 2023-11-02
Payer: COMMERCIAL

## 2023-11-02 ENCOUNTER — TRANSITIONAL CARE MANAGEMENT (OUTPATIENT)
Dept: FAMILY MEDICINE CLINIC | Facility: CLINIC | Age: 63
End: 2023-11-02

## 2023-11-02 DIAGNOSIS — I25.10 CORONARY ARTERY DISEASE INVOLVING NATIVE CORONARY ARTERY OF NATIVE HEART WITHOUT ANGINA PECTORIS: ICD-10-CM

## 2023-11-02 DIAGNOSIS — Z79.4 TYPE 2 DIABETES MELLITUS WITH DIABETIC NEUROPATHY, WITH LONG-TERM CURRENT USE OF INSULIN (HCC): Primary | ICD-10-CM

## 2023-11-02 DIAGNOSIS — E11.40 TYPE 2 DIABETES MELLITUS WITH DIABETIC NEUROPATHY, WITH LONG-TERM CURRENT USE OF INSULIN (HCC): Primary | ICD-10-CM

## 2023-11-02 DIAGNOSIS — E78.2 MIXED HYPERLIPIDEMIA: ICD-10-CM

## 2023-11-02 DIAGNOSIS — I73.9 PAD (PERIPHERAL ARTERY DISEASE) (HCC): ICD-10-CM

## 2023-11-02 PROCEDURE — 99305 1ST NF CARE MODERATE MDM 35: CPT | Performed by: INTERNAL MEDICINE

## 2023-11-02 NOTE — UTILIZATION REVIEW
NOTIFICATION OF ADMISSION DISCHARGE   This is a Notification of Discharge from [address] [facility]. Please be advised that this patient has been discharge from our facility. Below you will find the admission and discharge date and time including the patient’s disposition. UTILIZATION REVIEW CONTACT:  Polo Sandhu  Utilization   Network Utilization Review Department  Phone: [phone] x carefully listen to the prompts. All voicemails are confidential.  Email: [email or web address]     ADMISSION INFORMATION  PRESENTATION DATE: 10/19/2023 12:34 PM  OBERVATION ADMISSION DATE:   INPATIENT ADMISSION DATE: 10/19/23  4:32 PM   DISCHARGE DATE: 11/1/2023  2:03 PM   DISPOSITION:[facility] SNF/TCU/SNU    Network Utilization Review Department  ATTENTION: Please call with any questions or concerns to [phone] and carefully listen to the prompts so that you are directed to the right person. All voicemails are confidential.   For Discharge needs, contact Care Management DC Support Team at [phone] opt. 2  Send all requests for admission clinical reviews, approved or denied determinations and any other requests to dedicated fax number below belonging to the campus where the patient is receiving treatment.  List of dedicated fax numbers for the Facilities:  [place] DENIALS (Administrative/Medical Necessity) [phone]   DISCHARGE SUPPORT TEAM (Network) [phone] [facility] [phone]   [address] [facility] [address] [place] [address] [place] [address] [address] [phone]

## 2023-11-02 NOTE — PROGRESS NOTES
[facility]  [address] [facility], [address] [place] [address] Admission    NAME: Donna Rudd  AGE: 58 y.o. SEX: male [phone]    DATE OF ENCOUNTER: 11/2/2023    Assessment/Plan:         Patient’s care was coordinated with nursing facility staff. Recent vitals, labs and updated medications were reviewed on [facility] system of facility. Past Medical, surgical, social, medication and allergy history and patient’s previous records reviewed. 1. Type 2 diabetes mellitus with diabetic neuropathy, with long-term current use of insulin ([address])        2. PAD (peripheral artery disease) ([address])        3. Coronary artery disease involving native coronary artery of native heart without angina pectoris        4. Mixed hyperlipidemia             Type 2 diabetes mellitus with diabetic neuropathy, with long-term current use of insulin ([facility])  Stable sugars  Lab Results   Component Value Date    HGBA1C 6.7 (A) 02/02/2023   Cont lantus 40 units SQ BID (hold if sugar <100)  Check periodic Hga1c    PAD (peripheral artery disease) ([facility])  Pt s/p Rt AKA  Recent LEADS: "50-75% stenosis in proximal superficial femoral aa"  Cont asa 81 mg 1 tab po daily  Cont plavix 75 mg 1 tab po daily  Cont atorvastatin 80 mg 1 tab po QHS    CAD (coronary artery disease)  Stable  Cont aspirin 81 mg 1 tab po daily  Cont plavix 75 mg 1 tab po daily  Cont atorvastatin 80 mg 1 tab po QHS    Hyperlipidemia  Stable  Cont atorvastatin 80 mg 1 tab po qhs          Chief Complaint     Here for STR/LTC    HPI     Patient is a 58 y.o. male with PMH Anxiety, Depression, CHF, CAD, DMII, DMII neuropathy, hyperlipidemia, orthostatic hypotension and lymphedema. Pt admitted to [facility] from 10/19/2023 to 11/1/2023 for Rt foot chronic nonhealing Rt heel wound/gangrene. Pt was tachypneic, tachycardic and had weakness. In addition, pt had leukocytosis and was started on IV antibiotics.  Pt found to have bacteremia due to proteus species and completed 7 days of IV antibiotic. In addition, pt underwent Rt AKA as his limb was deemed nonsalvageablel Pt had wound vac in place until 10/30/2023. It was then removed and he was discharged to [facility] on 11/01/2023. Pt seen and examined today. Occupational therapy getting pt shower cap. Pt in hallway. Pt denies any pain. Pt complains of insomnia. Discussed melatonin. Pt agreeable to melatonin. Pt denies f/c/cp/n/v. Asked about looking at right knee stump as ace wrap in place. Pt says they already changed it today and deferred eval.       Past Medical History:   Diagnosis Date    Amputated below knee, left ([address]) 10/4/2018    Anxiety     Anxiety and depression     Cataract     Congestive cardiac failure ([address])     Coronary artery disease     Depression     Diabetes mellitus ([address])     Diabetic autonomic neuropathy associated with type 2 diabetes mellitus ([address])     Last Assessed: 12/28/2017    History of DVT (deep vein thrombosis)     left LE    Hyperlipidemia     Lymphedema of right lower extremity     Obesity     Orthostatic hypotension        Past Surgical History:   Procedure Laterality Date    AMPUTATION ABOVE KNEE (AKA) Right 10/25/2023    Procedure: (AKA);   Surgeon: Kelsey Graham MD;  Location: [facility];  Service: Vascular    CORONARY ARTERY BYPASS GRAFT      EYE SURGERY      cataracts bilateral    LEG AMPUTATION THROUGH LOWER TIBIA AND FIBULA Left 8/3/2018    Procedure: AMPUTATION BELOW KNEE (BKA) L BKA;  Surgeon: Charline Mcgee MD;  Location: [facility];  Service: Vascular    IL CORONARY ARTERY BYP W/VEIN & ARTERY GRAFT 4 VEIN N/A 3/28/2018    Procedure: CORONARY ARTERY BYPASS GRAFT (CABG) x3 VESSELS, LIMA TO LAD, SVG--> PDA, SVG--> OM2,  RIGHT LEG EVH/SVH TO , INTRA-OP AMANDEEP;  Surgeon: Jazmin Mauricio MD;  Location: [facility];  Service: Cardiac Surgery    ROTATOR CUFF REPAIR Bilateral        Social History     Tobacco Use   Smoking Status Former Packs/day: 1.00    Years: 12.00    Total pack years: 12.00    Types: Cigarettes    Quit date: 3/23/1994    Years since quittin.6   Smokeless Tobacco Never          Family History   Problem Relation Age of Onset    Atrial fibrillation Mother     Stroke Mother     Hypertension Father     Heart attack Paternal Grandfather 61    Diabetes Maternal Grandmother     Diabetes Maternal Grandfather     Diabetes Maternal Aunt     Diabetes Maternal Uncle         No Known Allergies       Current Outpatient Medications:     ammonium lactate (LAC-HYDRIN) 12 % lotion, Apply 1 Application topically in the morning, Disp: 222 mL, Rfl: 0    aspirin (ECOTRIN LOW STRENGTH) 81 mg EC tablet, Take 81 mg by mouth daily, Disp: , Rfl:     atorvastatin (LIPITOR) 80 mg tablet, Take 1 tablet (80 mg total) by mouth daily, Disp: 90 tablet, Rfl: 1    cholecalciferol (VITAMIN D3) 1,000 units tablet, Take 1,000 Units by mouth daily, Disp: , Rfl:     clopidogrel (PLAVIX) 75 mg tablet, TAKE 1 TABLET BY MOUTH DAILY, Disp: 90 tablet, Rfl: 0    finasteride (PROSCAR) 5 mg tablet, TAKE 1 TABLET BY MOUTH DAILY, Disp: 100 tablet, Rfl: 2    furosemide (LASIX) 40 mg tablet, TAKE 1 TABLET BY MOUTH TWICE  DAILY, Disp: 200 tablet, Rfl: 2    glucose blood test strip, ReliOn Prime Meter, Disp: , Rfl:     Insulin Glargine Solostar (Lantus SoloStar) 100 UNIT/ML SOPN, Inject 0.4 mL (40 Units total) under the skin 2 (two) times a day This is dosing for rehab on discharge, Disp: , Rfl: 0    metoclopramide (REGLAN) 5 mg tablet, Take 1 tablet (5 mg total) by mouth daily, Disp: 90 tablet, Rfl: 1    midodrine (PROAMATINE) 2.5 mg tablet, Take 2.5 mg by mouth 3 (three) times a day before meals PT STATED HE IS NOT TAKING THIS AND DOES NOT WANT TO. , Disp: , Rfl:     oxyCODONE (ROXICODONE) 5 immediate release tablet, Take 1 tablet (5 mg total) by mouth every 8 (eight) hours as needed for moderate pain for up to 5 days Max Daily Amount: 15 mg, Disp: 15 tablet, Rfl: 0 pantoprazole (PROTONIX) 40 mg tablet, TAKE 1 TABLET BY MOUTH DAILY IN  THE EARLY MORNING, Disp: 90 tablet, Rfl: 0    sodium hypochlorite (DAKIN'S QUARTER-STRENGTH), Irrigate with 1 Application as directed daily, Disp: 473 mL, Rfl: 1    tamsulosin (FLOMAX) 0.4 mg, Take 1 capsule (0.4 mg total) by mouth daily with dinner, Disp: 90 capsule, Rfl: 1    Updated list was reviewed in [facility] system of facility. Vital signs were reviewed in point [facility]    Review of Systems   All other systems reviewed and are negative. Physical Exam  Constitutional:       General: He is not in acute distress. Appearance: He is not ill-appearing. HENT:      Head: Normocephalic. Cardiovascular:      Rate and Rhythm: Normal rate. Heart sounds: Normal heart sounds. No murmur heard. Pulmonary:      Effort: No respiratory distress. Breath sounds: Normal breath sounds. No wheezing. Abdominal:      General: Bowel sounds are normal. There is no distension. Palpations: Abdomen is soft. Tenderness: There is no abdominal tenderness. Skin:     Comments: Pt with dressing c/d/I of Rt aka  Pt with hx of Lt aka   Neurological:      Mental Status: He is alert and oriented to person, place, and time. Psychiatric:         Mood and Affect: Mood normal.         Behavior: Behavior normal.           Diagnostic Data       Recent labs and imaging studies were reviewed.  s     Code Status:    Full    Additional notes: Total time spent on H&P 35 minutes in reviewing discharge paperwork from the hospital, interpreting test results from hospital medical records, and documenting information in the medical record. In addition, I provided counseling to the patient and encouraged them to work with physical therapy. Gave orders for patient's medications and nursing home admission orders.      This note was electronically signed by Dr. Margaret Chung

## 2023-11-03 NOTE — ASSESSMENT & PLAN NOTE
Stable sugars  Lab Results   Component Value Date    HGBA1C 6.7 (A) 02/02/2023   Cont lantus 40 units SQ BID (hold if sugar <100)  Check periodic Hga1c

## 2023-11-03 NOTE — ASSESSMENT & PLAN NOTE
Pt s/p Rt AKA  Recent LEADS: "50-75% stenosis in proximal superficial femoral aa"  Cont asa 81 mg 1 tab po daily  Cont plavix 75 mg 1 tab po daily  Cont atorvastatin 80 mg 1 tab po QHS

## 2023-11-03 NOTE — ASSESSMENT & PLAN NOTE
Stable  Cont aspirin 81 mg 1 tab po daily  Cont plavix 75 mg 1 tab po daily  Cont atorvastatin 80 mg 1 tab po QHS

## 2023-11-06 ENCOUNTER — TELEPHONE (OUTPATIENT)
Dept: VASCULAR SURGERY | Facility: CLINIC | Age: 63
End: 2023-11-06

## 2023-11-06 ENCOUNTER — NURSING HOME VISIT (OUTPATIENT)
Dept: GERIATRICS | Facility: OTHER | Age: 63
End: 2023-11-06
Payer: COMMERCIAL

## 2023-11-06 DIAGNOSIS — E11.43 DIABETIC GASTROPARESIS: Chronic | ICD-10-CM

## 2023-11-06 DIAGNOSIS — Z79.4 TYPE 2 DIABETES MELLITUS WITH DIABETIC NEUROPATHY, WITH LONG-TERM CURRENT USE OF INSULIN (HCC): ICD-10-CM

## 2023-11-06 DIAGNOSIS — Z89.611 S/P AKA (ABOVE KNEE AMPUTATION), RIGHT (HCC): Primary | ICD-10-CM

## 2023-11-06 DIAGNOSIS — K31.84 DIABETIC GASTROPARESIS: Chronic | ICD-10-CM

## 2023-11-06 DIAGNOSIS — F41.9 ANXIETY AND DEPRESSION: ICD-10-CM

## 2023-11-06 DIAGNOSIS — F32.A ANXIETY AND DEPRESSION: ICD-10-CM

## 2023-11-06 DIAGNOSIS — E11.40 TYPE 2 DIABETES MELLITUS WITH DIABETIC NEUROPATHY, WITH LONG-TERM CURRENT USE OF INSULIN (HCC): ICD-10-CM

## 2023-11-06 DIAGNOSIS — I73.9 PAD (PERIPHERAL ARTERY DISEASE) (HCC): ICD-10-CM

## 2023-11-06 PROCEDURE — 99309 SBSQ NF CARE MODERATE MDM 30: CPT

## 2023-11-06 NOTE — TELEPHONE ENCOUNTER
Vascular Nurse Navigator Post Op Call    Procedure:  Right - (AKA)     Date of Procedure:  10/25/23    Surgeon:     Luana Todd MD - Primary     * Brooklynn Lynn DO - Assisting    Discharge Date:  11/1/23    Discharge Disposition: Rehab - [facility]    Chest Pain?:No    Shortness of Breath?:No     Increased Pain, Swelling, Warmth, or Redness? :No    Purulent Drainage?:No    Foul- smelling drainage?: No    Signs of dehiscence?:No    Fever/Chills? :No    Bleeding?:No    Anticoagulation pt was discharged on post op?: Aspirin and Clopidogrel (Plavix)    Statin pt was discharged on post op?: Lipitor (atorvastatin)    Uncontrolled Pain?:No    Plan for Prosthetic in Future?: Yes, but nurse unsure of company patient used previously or will use in the future    Prosthetic Provider?:  Other - see above      Reviewed discharge instructions and incision care with patient. NEXT OFFICE VISIT SCHEDULED:  11/22/23 at 9 am with CARLTON Johnson at The [address] [place] [address]    Transportation: Yes, facility to arrange transportation      Any further questions/concerns? Spoke with Oliver Whalen, nurse at [facility], in regards to above. She stated that patient's incision looks good and without any redness or open areas. She stated that patient has had very scant amount of drainage, but denies odor. All questions answered. No concerns expressed at this time.

## 2023-11-07 VITALS
BODY MASS INDEX: 28.96 KG/M2 | TEMPERATURE: 98.1 F | SYSTOLIC BLOOD PRESSURE: 100 MMHG | OXYGEN SATURATION: 97 % | HEART RATE: 65 BPM | DIASTOLIC BLOOD PRESSURE: 60 MMHG | WEIGHT: 219.5 LBS | RESPIRATION RATE: 18 BRPM

## 2023-11-07 RX ORDER — ACETAMINOPHEN 325 MG/1
650 TABLET ORAL EVERY 4 HOURS PRN
COMMUNITY

## 2023-11-07 RX ORDER — ALPRAZOLAM 0.25 MG/1
0.25 TABLET ORAL
COMMUNITY

## 2023-11-07 NOTE — ASSESSMENT & PLAN NOTE
Recent hospitalization for a right food wound/ulcer  Blood cultures growing Protus  ID recommending treatment with 7-day course of IV antibiotics  Podiatry and Vascular Surgery deeming right leg unsalvageable   Right AKA on 10/25 with Vascular Surgery  Sutures and staples in place  Wound evaluated today with no redness, drainage, or tenderness  Continue daily wound treatment  Follow up with Vascular Surgery on 11/22/23

## 2023-11-07 NOTE — ASSESSMENT & PLAN NOTE
Started on trial of metoclopramide 5 mg po daily in hospital  No nausea or vomiting noted while at rehab  Continue to monitor   Can trial discontinuing medication prior to discharge

## 2023-11-07 NOTE — ASSESSMENT & PLAN NOTE
Family expressing concerns to nursing staff  Resident stating it has been a hard adjustment  Was on alprazolam 0.25 mg po while in hospital  Resident requesting a dose as needed to help him sleep at night  Recommended psychiatry evaluation  Resident agreeable   Continue supportive care while at rehab

## 2023-11-07 NOTE — ASSESSMENT & PLAN NOTE
S/p right AKA on 10/25  Continue aspirin 81 mg po daily  Continue atorvastatin 80 mg po daily  Continue clopidogrel 75 mg po daily  Monitor site of AKA daily with wound care completed daily

## 2023-11-07 NOTE — PROGRESS NOTES
[address], [address] [place] [address] [address]  [phone]    NAME: Ruth Lester  AGE: 58 y.o. SEX: male    Progress Note    Location: [facility]  POS: 32 ([facility])  Code Status: DNR    Chief complaint / Reason for visit:  Follow-up visit    Assessment/Plan:    S/P AKA (above knee amputation), right ([address])  Recent hospitalization for a right food wound/ulcer  Blood cultures growing Protus  ID recommending treatment with 7-day course of IV antibiotics  Podiatry and Vascular Surgery deeming right leg unsalvageable   Right AKA on 10/25 with Vascular Surgery  Sutures and staples in place  Wound evaluated today with no redness, drainage, or tenderness  Continue daily wound treatment  Follow up with Vascular Surgery on 11/22/23    Diabetic gastroparesis   Started on trial of metoclopramide 5 mg po daily in hospital  No nausea or vomiting noted while at rehab  Continue to monitor   Can trial discontinuing medication prior to discharge    Type 2 diabetes mellitus with diabetic neuropathy, with long-term current use of insulin ([facility])    Lab Results   Component Value Date    HGBA1C 6.7 (A) 02/02/2023     Continue insulin lantus 40 units SQ BID  Blood sugars reviewed from facility records  Acceptable  Continue to monitor  Encourage CCD    PAD (peripheral artery disease) ([address])  S/p right AKA on 10/25  Continue aspirin 81 mg po daily  Continue atorvastatin 80 mg po daily  Continue clopidogrel 75 mg po daily  Monitor site of AKA daily with wound care completed daily    Anxiety and depression  Family expressing concerns to nursing staff  Resident stating it has been a hard adjustment  Was on alprazolam 0.25 mg po while in hospital  Resident requesting a dose as needed to help him sleep at night  Recommended psychiatry evaluation  Resident agreeable   Continue supportive care while at rehab      This is a 58 y.o. male seen today at [facility].   Medical history includes, not limited to, s/p right AKA, history of left BKA, type 2 DM, peripheral vascular disease, gastroparesis, CHF, hyperlipidemia, GERD, anxiety, vitamin deficiency, and atherosclerotic heart disease. Resident with a recent hospitalization from 10/19-11/01. He presented to the hospital with a right foot wound. He was evaluated by podiatry and vascular surgery. The patient's foot was deemed nonsalvageable with recommendation from podiatry for a right AKA. On 10/25 the patient was taken to surgery by Vascular Surgery. Resident is seen today for a follow up visit. Upon entering the room he is out of bed in the wheelchair. He is alert and oriented x 3, able to answer all questions appropriately. He states he is doing well with therapy. Denies pain in his right leg. States he has been eating and drinking well. Denies problems with bowel or bladder. He states he has been having difficulty sleeping at night. Prior to his recent hospitalization he states he was sleeping in a recliner. Post surgery and being at rehab he has been sleeping in a bed which he states has been an adjustment. Reviewed resident's recent hospital records, medications and vitals. Reviewed resident with nursing staff. No concerns at this time. Nursing and prior provider notes reviewed on this visit. Discussed visit with PCP and nursing staff/ supervisor. Review of Systems   Constitutional:  Positive for activity change. Negative for appetite change, fatigue and fever. HENT:  Negative for congestion and rhinorrhea. Eyes:  Negative for pain and visual disturbance. Respiratory:  Negative for cough and shortness of breath. Cardiovascular:  Negative for chest pain and leg swelling. Gastrointestinal:  Negative for abdominal pain and constipation. Genitourinary:  Negative for difficulty urinating and dysuria. Musculoskeletal:  Positive for gait problem. Negative for arthralgias. Skin:  Positive for wound (R AKA). Negative for color change and rash. Neurological:  Negative for dizziness, syncope and weakness. Psychiatric/Behavioral:  Negative for behavioral problems and confusion. All other systems reviewed and are negative. ALLERGY: Reviewed, unchanged  No Known Allergies    HISTORY:  Medical Hx: Reviewed, unchanged  Family Hx: Reviewed, unchanged  Soc Hx: Reviewed,  unchanged      Physical Exam  Vitals reviewed. Constitutional:       General: He is not in acute distress. Appearance: Normal appearance. He is not ill-appearing. HENT:      Head: Normocephalic. Right Ear: Tympanic membrane normal.      Left Ear: Tympanic membrane normal.      Nose: Nose normal. No congestion. Mouth/Throat:      Mouth: Mucous membranes are moist.      Pharynx: Oropharynx is clear. Eyes:      General:         Right eye: No discharge. Left eye: No discharge. Extraocular Movements: Extraocular movements intact. Conjunctiva/sclera: Conjunctivae normal.      Pupils: Pupils are equal, round, and reactive to light. Cardiovascular:      Rate and Rhythm: Normal rate and regular rhythm. Pulses: Normal pulses. Heart sounds: Normal heart sounds. Pulmonary:      Effort: Pulmonary effort is normal. No respiratory distress. Breath sounds: Normal breath sounds. Chest:      Chest wall: No tenderness. Abdominal:      General: Bowel sounds are normal.      Palpations: Abdomen is soft. Tenderness: There is no abdominal tenderness. Musculoskeletal:         General: Deformity present. No swelling. Normal range of motion. Cervical back: Normal range of motion. Right lower leg: No edema. Left lower leg: No edema. Comments: Right AKA, left BKA   Skin:     General: Skin is warm and dry. Comments: Sutures and staples to right AKA   Neurological:      Mental Status: He is alert and oriented to person, place, and time. Mental status is at baseline.       Motor: No weakness. Psychiatric:         Mood and Affect: Mood normal.         Behavior: Behavior normal.         Thought Content: Thought content normal.         Judgment: Judgment normal.          Laboratory results / Imaging reviewed: Hard copy/ies in medical chart:    Vitals:    11/06/23 2138   BP: 100/60   Pulse: 65   Resp: 18   Temp: 98.1 °F (36.7 °C)   SpO2: 97%       Current Medications: All medications reviewed and updated in Nursing Home Chart    Please note: This note was completed in part utilizing a voice-recognition software may have been used in the preparation of this document. Grammatical errors, random word insertion, spelling mistakes, and incomplete sentences may be an occasional consequence of the system secondary to software limitations, ambient noise and hardware issues. Occasional wrong word or "sound-alike" substitutions may have occurred due to the inherent limitations of voice recognition software. At the time of dictation, efforts were made to edit, clarify and/or correct errors. Interpretation should be guided by context. Please read the chart carefully and recognize, using context, where substitutions have occurred. If you have any questions or concerns about the context, text or information contained within the body of this dictation, please contact myself, the provider, for further clarification.       Elinda Severs, CRNP  11/7/2023

## 2023-11-07 NOTE — ASSESSMENT & PLAN NOTE
Lab Results   Component Value Date    HGBA1C 6.7 (A) 02/02/2023     Continue insulin lantus 40 units SQ BID  Blood sugars reviewed from facility records  Acceptable  Continue to monitor  Encourage CCD

## 2023-11-10 ENCOUNTER — NURSING HOME VISIT (OUTPATIENT)
Dept: GERIATRICS | Facility: OTHER | Age: 63
End: 2023-11-10
Payer: COMMERCIAL

## 2023-11-10 DIAGNOSIS — F32.A ANXIETY AND DEPRESSION: ICD-10-CM

## 2023-11-10 DIAGNOSIS — E78.2 MIXED HYPERLIPIDEMIA: ICD-10-CM

## 2023-11-10 DIAGNOSIS — Z89.611 S/P AKA (ABOVE KNEE AMPUTATION), RIGHT (HCC): Primary | ICD-10-CM

## 2023-11-10 DIAGNOSIS — I25.10 CORONARY ARTERY DISEASE INVOLVING NATIVE CORONARY ARTERY OF NATIVE HEART WITHOUT ANGINA PECTORIS: ICD-10-CM

## 2023-11-10 DIAGNOSIS — F41.9 ANXIETY AND DEPRESSION: ICD-10-CM

## 2023-11-10 PROCEDURE — 99309 SBSQ NF CARE MODERATE MDM 30: CPT | Performed by: INTERNAL MEDICINE

## 2023-11-10 NOTE — PROGRESS NOTES
[address]  [phone]) [facility]   POS 32      NAME: Cecilio Albrecht  AGE: 58 y.o. SEX: male [phone]    DATE OF ENCOUNTER: 11/10/2023    Assessment and Plan     1. S/P AKA (above knee amputation), right ([facility])        2. Mixed hyperlipidemia        3. Anxiety and depression        4. Coronary artery disease involving native coronary artery of native heart without angina pectoris           S/P AKA (above knee amputation), right (HCC)  Rt aka site c/d/I  Pt's site healing well  Pt with no erythema  Pt denies any pain  Cont local wound care  Keep vascular appt for 11/22/2023    Hyperlipidemia  Stable  Cont atorvastatin 80 mg 1 tab po QHS    Anxiety and depression  Stable  Cont supportive care  Consider melatonin to help him sleep vs low dose restoril    CAD (coronary artery disease)  Stable  Cont atorvastatin 80 mg 1 tab po qhs  Cont plavix 75 mg 1 tab po daily  Cont aspirin 81 mg 1 tab po daily       Code status: CPR    Chief Complaint     STR follow-up visit. History of Present Illness   Pt seen and examined as part of STR visit. Pt denies any stump pain or drainage. Rt stump is healing well; pt with 35 staples c/d/i    The following portions of the patient's history were reviewed and updated as appropriate: allergies, current medications, past family history, past medical history, past social history, past surgical history and problem list.    Review of Systems     Review of Systems   Gastrointestinal:  Negative for nausea. Musculoskeletal:  Negative for arthralgias. All other systems reviewed and are negative.     Active Problem List     Patient Active Problem List   Diagnosis    Anxiety and depression    Type 2 diabetes mellitus with diabetic neuropathy, with long-term current use of insulin ([address]    Hyperlipidemia    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    Vitamin D deficiency    PAD (peripheral artery disease) ([address])    S/P BKA (below knee amputation), left (HCC)    Orthostatic hypotension    Diabetic gastroparesis     Class 2 severe obesity due to excess calories with serious comorbidity and body mass index (BMI) of 35.0 to 35.9 in adult     Triple vessel coronary artery disease    Hypertensive heart disease with congestive heart failure (HCC)    Chronic heart failure with preserved ejection fraction (HCC)    S/P CABG x 3    Diabetic autonomic neuropathy associated with type 2 diabetes mellitus (HCC)    Hyponatremia    Obstructive sleep apnea    Phantom limb syndrome without pain (HCC)    CAD (coronary artery disease)    Nodular rash    Elephantiasis nostras verrucosa    Embolism and thrombosis of arteries of the lower extremities (HCC)    Lymphedema of right lower extremity    Venous stasis dermatitis of right lower extremity    Abdominal distension    Non-pressure chronic ulcer of right calf with fat layer exposed ([address])    Cellulitis of right ankle    Lymphedema in adult patient    Diabetic ulcer of right heel (HCC)    Loose stools    Elevated troponin    Sepsis ([address])    Bacteremia due to Proteus species    Gangrene ([facility])    Anemia    S/P AKA (above knee amputation), right (HCC)       Objective     Vitals: Reviewed in [employer] system. VSS    General: Awake, alert & oriented x 3  Head: atraumatic, normocephalic  Cardiovascular: RRR  Lungs: Clear to auscultation bilaterally  Abdomen: nontender/nondistended, +BS  Legs: no cyanosis, clubbing; nurse Susan Decant. Present -pt with 35 staples Rt stump; pt's wound healing properly; no drainage or erythema; Rt AKA  Skin: clean, dry  Psych: calm, cooperative    Pertinent Laboratory/Diagnostic Studies:  Refer to facility chart. Current Medications   Medications reviewed and updated in facility chart.     Cortney Joyner MD  Internal Medicine  Senior Care Physician

## 2023-11-15 ENCOUNTER — NURSING HOME VISIT (OUTPATIENT)
Dept: GERIATRICS | Facility: OTHER | Age: 63
End: 2023-11-15
Payer: COMMERCIAL

## 2023-11-15 DIAGNOSIS — Z89.611 S/P AKA (ABOVE KNEE AMPUTATION), RIGHT (HCC): Primary | ICD-10-CM

## 2023-11-15 PROCEDURE — 99307 SBSQ NF CARE SF MDM 10: CPT | Performed by: INTERNAL MEDICINE

## 2023-11-15 NOTE — PROGRESS NOTES
[address]  [phone]   [facility]   POS 31      NAME: Tyler Rick  AGE: 58 y.o. SEX: male [phone]    DATE OF ENCOUNTER: 11/15/2023    Assessment and Plan     1. S/P AKA (above knee amputation), right (HCC)           S/P AKA (above knee amputation), right ([address])  Dressing c/d/I  Per nursing staff, site clean, intact; no drainage  Cont local wound care  Next vascular appt on 11/22/2023       Code status: DNR/DNI (confirmed with pt)    Chief Complaint     STR follow-up visit. History of Present Illness     Pt seen and examined. Dressing c/d/I. Per nursing staff, stump site is clean & intact; no drainage noted. Pt also had dry heaving which went away. Pt eating some melas    The following portions of the patient's history were reviewed and updated as appropriate: allergies, current medications, past family history, past medical history, past social history, past surgical history and problem list.    Review of Systems     Review of Systems   Gastrointestinal:  Positive for nausea. All other systems reviewed and are negative.       Active Problem List     Patient Active Problem List   Diagnosis    Anxiety and depression    Type 2 diabetes mellitus with diabetic neuropathy, with long-term current use of insulin ([address])    Hyperlipidemia    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    Vitamin D deficiency    PAD (peripheral artery disease) ([facility])    S/P BKA (below knee amputation), left (HCC)    Orthostatic hypotension    Diabetic gastroparesis     Class 2 severe obesity due to excess calories with serious comorbidity and body mass index (BMI) of 35.0 to 35.9 in adult     Triple vessel coronary artery disease    Hypertensive heart disease with congestive heart failure (HCC)    Chronic heart failure with preserved ejection fraction (HCC)    S/P CABG x 3    Diabetic autonomic neuropathy associated with type 2 diabetes mellitus (HCC)    Hyponatremia    Obstructive sleep apnea    Phantom limb syndrome without pain (HCC)    CAD (coronary artery disease)    Nodular rash    Elephantiasis nostras verrucosa    Embolism and thrombosis of arteries of the lower extremities ([facility])    Lymphedema of right lower extremity    Venous stasis dermatitis of right lower extremity    Abdominal distension    Non-pressure chronic ulcer of right calf with fat layer exposed ([address])    Cellulitis of right ankle    Lymphedema in adult patient    Diabetic ulcer of right heel ([facility])    Loose stools    Elevated troponin    Sepsis ([address])    Bacteremia due to Proteus species    Gangrene ([facility])    Anemia    S/P AKA (above knee amputation), right ([facility])       Objective     Vitals: Reviewed in [employer] system. VSS    General: Awake, alert & oriented x 3  Head: atraumatic, normocephalic  Cardiovascular: RRR  Lungs: Clear to auscultation bilaterally  Abdomen: nontender/nondistended, +BS  B/l stump sites dressings c/d/i  Skin: clean, dry  Psych: calm, cooperative    Pertinent Laboratory/Diagnostic Studies:  Refer to facility chart. Current Medications   Medications reviewed and updated in facility chart.     Mey Blevins MD  Internal Medicine  Senior Care Physician

## 2023-11-17 ENCOUNTER — VBI (OUTPATIENT)
Dept: ADMINISTRATIVE | Facility: OTHER | Age: 63
End: 2023-11-17

## 2023-11-21 NOTE — ASSESSMENT & PLAN NOTE
Dressing c/d/I  Per nursing staff, site clean, intact; no drainage  Cont local wound care  Next vascular appt on 11/22/2023

## 2023-11-22 ENCOUNTER — TELEPHONE (OUTPATIENT)
Dept: VASCULAR SURGERY | Facility: CLINIC | Age: 63
End: 2023-11-22

## 2023-11-22 ENCOUNTER — OFFICE VISIT (OUTPATIENT)
Dept: VASCULAR SURGERY | Facility: CLINIC | Age: 63
End: 2023-11-22

## 2023-11-22 VITALS — OXYGEN SATURATION: 91 % | DIASTOLIC BLOOD PRESSURE: 80 MMHG | SYSTOLIC BLOOD PRESSURE: 102 MMHG | HEART RATE: 83 BPM

## 2023-11-22 DIAGNOSIS — Z89.611 S/P AKA (ABOVE KNEE AMPUTATION), RIGHT (HCC): Primary | ICD-10-CM

## 2023-11-22 PROCEDURE — 99024 POSTOP FOLLOW-UP VISIT: CPT

## 2023-11-22 NOTE — ASSESSMENT & PLAN NOTE
Patient is currently denying any pain, fever, chills, redness, warmth, or edema to right AKA site. He has been washing his incision daily with soap and water and painting with Betadine. Patient has not been wearing compression to his AKA stump. He is currently residing at [facility] for rehab. Right AKA incision is well-approximated with sutures and staples still in place. Incision cleansed with saline soaked gauze. No redness, warmth, or drainage noted from incision. Staples and sutures removed without issue. No areas of dehiscence noted, however small scab noted to medial aspect of incision with scant bleeding noted. Several internal sutures noted to have migrated to the surface without any signs of irritation or infection. Plan:  - Right AKA incision appears to be healing appropriately. No signs of infection at this time. - Patient instructed to continue washing incision daily with soap and water. Paint incision with Betadine daily and cover with gauze. - Continue to elevate stump periodically throughout the day to reduce swelling. Light compression with Tubigrip. - Patient will return to the office in 2 weeks to reassess incision.   Order placed for stump   - Patient instructed to notify the office should he develop any warmth, erythema, drainage, or pain to incision.  -Continue aspirin, clopidogrel, and atorvastatin

## 2023-11-22 NOTE — PATIENT INSTRUCTIONS
Wash incision daily with soap and water, pat dry. Paint incision with betadine daily, cover with gauze or ABD pad. Apply tubigrip for light compression.  Can start to use stump

## 2023-11-22 NOTE — PROGRESS NOTES
Assessment/Plan:    S/P AKA (above knee amputation), right [facility])  Patient is currently denying any pain, fever, chills, redness, warmth, or edema to right AKA site. He has been washing his incision daily with soap and water and painting with Betadine. Patient has not been wearing compression to his AKA stump. He is currently residing at [facility] for rehab. Right AKA incision is well-approximated with sutures and staples still in place. Incision cleansed with saline soaked gauze. No redness, warmth, or drainage noted from incision. Staples and sutures removed without issue. No areas of dehiscence noted, however small scab noted to medial aspect of incision with scant bleeding noted. Several internal sutures noted to have migrated to the surface without any signs of irritation or infection. Plan:  - Right AKA incision appears to be healing appropriately. No signs of infection at this time. - Patient instructed to continue washing incision daily with soap and water. Paint incision with Betadine daily and cover with gauze. - Continue to elevate stump periodically throughout the day to reduce swelling. Light compression with Tubigrip. - Patient will return to the office in 2 weeks to reassess incision. Order placed for stump   - Patient instructed to notify the office should he develop any warmth, erythema, drainage, or pain to incision.  -Continue aspirin, clopidogrel, and atorvastatin       Diagnoses and all orders for this visit:    S/P AKA (above knee amputation), right [facility])  -     Prosthetic          Subjective:      Patient ID: Nancy Salgado is a 58 y.o. male. Patient is a 69-year-old male, former smoker, with PMH type II DM, HLD, HTN, obesity, CAD s/p CABG x 3, HEMA, lymphedema, and PAD s/p bilateral lower extremity amputations. Patient is presenting to the vascular surgery office for initial post-op evaluation and staple removal s/p R AKA 10/25/23 [facility]).     Patient is currently denying any pain, fever, chills, redness, warmth, or edema to right AKA site. He has been washing his incision daily with soap and water and painting with Betadine. Patient has not been wearing compression to his AKA stump. He is currently residing at [facility] for rehab. The following portions of the patient's history were reviewed and updated as appropriate: allergies, current medications, past family history, past medical history, past social history, past surgical history, and problem list.    Review of Systems   Constitutional: Negative. Negative for chills and fever. HENT: Negative. Eyes: Negative. Respiratory: Negative. Negative for shortness of breath. Cardiovascular: Negative. Negative for chest pain and leg swelling. Gastrointestinal:  Positive for nausea. Endocrine: Negative. Genitourinary: Negative. Musculoskeletal: Negative. Skin:  Positive for wound (R AKA site). Negative for color change and pallor. Allergic/Immunologic: Negative. Neurological: Negative. Negative for numbness. Hematological: Negative. Psychiatric/Behavioral: Negative. Objective:    /80 (BP Location: Left arm, Patient Position: Sitting, Cuff Size: Large)   Pulse 83   SpO2 91%        Physical Exam  Constitutional:       General: He is not in acute distress. Appearance: Normal appearance. HENT:      Head: Normocephalic and atraumatic. Cardiovascular:      Rate and Rhythm: Normal rate and regular rhythm. Pulmonary:      Effort: Pulmonary effort is normal. No respiratory distress. Musculoskeletal:         General: Tenderness (incisional R AKA tenderness) present. No swelling. Right lower leg: No edema. Left lower leg: No edema. Skin:     General: Skin is warm and dry. Capillary Refill: Capillary refill takes less than 2 seconds. Findings: Wound present. No erythema, lesion or rash.       Comments: Well-approximated right AKA incision Neurological:      General: No focal deficit present. Mental Status: He is alert and oriented to person, place, and time. Psychiatric:         Mood and Affect: Mood normal.         Behavior: Behavior normal.         I have reviewed and made appropriate changes to the review of systems input by the medical assistant.     Vitals:    11/22/23 0844   BP: 102/80   BP Location: Left arm   Patient Position: Sitting   Cuff Size: Large   Pulse: 83   SpO2: 91%       Patient Active Problem List   Diagnosis    Anxiety and depression    Type 2 diabetes mellitus with diabetic neuropathy, with long-term current use of insulin ([facility])    Hyperlipidemia    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    Vitamin D deficiency    PAD (peripheral artery disease) ([facility])    S/P BKA (below knee amputation), left ([facility])    Orthostatic hypotension    Diabetic gastroparesis     Class 2 severe obesity due to excess calories with serious comorbidity and body mass index (BMI) of 35.0 to 35.9 in adult     Triple vessel coronary artery disease    Hypertensive heart disease with congestive heart failure ([facility])    Chronic heart failure with preserved ejection fraction ([facility])    S/P CABG x 3    Diabetic autonomic neuropathy associated with type 2 diabetes mellitus ([facility])    Hyponatremia    Obstructive sleep apnea    Phantom limb syndrome without pain ([facility])    CAD (coronary artery disease)    Nodular rash    Elephantiasis nostras verrucosa    Embolism and thrombosis of arteries of the lower extremities ([facility])    Lymphedema of right lower extremity    Venous stasis dermatitis of right lower extremity    Abdominal distension    Non-pressure chronic ulcer of right calf with fat layer exposed ([facility])    Cellulitis of right ankle    Lymphedema in adult patient    Diabetic ulcer of right heel ([facility])    Loose stools    Elevated troponin    Sepsis ([address])    Bacteremia due to Proteus species    Gangrene ([facility])    Anemia S/P AKA (above knee amputation), right [facility])       Past Surgical History:   Procedure Laterality Date    AMPUTATION ABOVE KNEE (AKA) Right 10/25/2023    Procedure: (AKA);   Surgeon: Mickie Frank MD;  Location: [facility];  Service: Vascular    CORONARY ARTERY BYPASS GRAFT      EYE SURGERY      cataracts bilateral    LEG AMPUTATION THROUGH LOWER TIBIA AND FIBULA Left 8/3/2018    Procedure: AMPUTATION BELOW KNEE (BKA) L BKA;  Surgeon: Sherwin Arthur MD;  Location: [facility];  Service: Vascular    SD CORONARY ARTERY BYP W/VEIN & ARTERY GRAFT 4 VEIN N/A 3/28/2018    Procedure: CORONARY ARTERY BYPASS GRAFT (CABG) x3 VESSELS, LIMA TO LAD, SVG--> PDA, SVG--> OM2,  RIGHT LEG EVH/SVH TO , INTRA-OP AMANDEEP;  Surgeon: Girma Persaud MD;  Location: [facility];  Service: Cardiac Surgery    ROTATOR CUFF REPAIR Bilateral        Family History   Problem Relation Age of Onset    Atrial fibrillation Mother     Stroke Mother     Hypertension Father     Heart attack Paternal Grandfather 61    Diabetes Maternal Grandmother     Diabetes Maternal Grandfather     Diabetes Maternal Aunt     Diabetes Maternal Uncle        Social History     Socioeconomic History    Marital status: /Civil Union     Spouse name: Not on file    Number of children: Not on file    Years of education: Not on file    Highest education level: Not on file   Occupational History    Not on file   Tobacco Use    Smoking status: Former     Packs/day: 1.00     Years: 12.00     Total pack years: 12.00     Types: Cigarettes     Quit date: 3/23/1994     Years since quittin.6    Smokeless tobacco: Never   Vaping Use    Vaping Use: Never used   Substance and Sexual Activity    Alcohol use: Never     Comment: rarely    Drug use: No    Sexual activity: Not Currently   Other Topics Concern    Not on file   Social History Narrative    Not on file     Social Determinants of Health     Financial Resource Strain: Not on file   Food Insecurity: No Food Insecurity (10/22/2023) Hunger Vital Sign     Worried About Running Out of Food in the Last Year: Never true     Ran Out of Food in the Last Year: Never true   Transportation Needs: No Transportation Needs (10/22/2023)    PRAPARE - Transportation     Lack of Transportation (Medical): No     Lack of Transportation (Non-Medical): No   Physical Activity: Not on file   Stress: Not on file   Social Connections: Moderately Isolated (10/29/2020)    Social Connection and Isolation Panel [NHANES]     Frequency of Communication with Friends and Family: Three times a week     Frequency of Social Gatherings with Friends and Family:  Three times a week     Attends Buddhist Services: Never     Active Member of Clubs or Organizations: No     Attends Club or Organization Meetings: Never     Marital Status:    Intimate Partner Violence: Not At Risk (10/29/2020)    Humiliation, Afraid, Rape, and Kick questionnaire     Fear of Current or Ex-Partner: No     Emotionally Abused: No     Physically Abused: No     Sexually Abused: No   Housing Stability: Low Risk  (10/22/2023)    Housing Stability Vital Sign     Unable to Pay for Housing in the Last Year: No     Number of [address] in the Last Year: 1     Unstable Housing in the Last Year: No       No Known Allergies      Current Outpatient Medications:     acetaminophen (TYLENOL) 325 mg tablet, Take 650 mg by mouth every 4 (four) hours as needed for mild pain, moderate pain or fever, Disp: , Rfl:     ALPRAZolam (XANAX) 0.25 mg tablet, Take 0.25 mg by mouth daily at bedtime as needed for anxiety or sleep, Disp: , Rfl:     aspirin (ECOTRIN LOW STRENGTH) 81 mg EC tablet, Take 81 mg by mouth daily, Disp: , Rfl:     atorvastatin (LIPITOR) 80 mg tablet, Take 1 tablet (80 mg total) by mouth daily, Disp: 90 tablet, Rfl: 1    cholecalciferol (VITAMIN D3) 1,000 units tablet, Take 1,000 Units by mouth daily, Disp: , Rfl:     clopidogrel (PLAVIX) 75 mg tablet, TAKE 1 TABLET BY MOUTH DAILY, Disp: 90 tablet, Rfl: 0    finasteride (PROSCAR) 5 mg tablet, TAKE 1 TABLET BY MOUTH DAILY, Disp: 100 tablet, Rfl: 2    furosemide (LASIX) 40 mg tablet, TAKE 1 TABLET BY MOUTH TWICE  DAILY, Disp: 200 tablet, Rfl: 2    glucose blood test strip, ReliOn Prime Meter, Disp: , Rfl:     Insulin Glargine Solostar (Lantus SoloStar) 100 UNIT/ML SOPN, Inject 0.4 mL (40 Units total) under the skin 2 (two) times a day This is dosing for rehab on discharge, Disp: , Rfl: 0    metoclopramide (REGLAN) 5 mg tablet, Take 1 tablet (5 mg total) by mouth daily, Disp: 90 tablet, Rfl: 1    pantoprazole (PROTONIX) 40 mg tablet, TAKE 1 TABLET BY MOUTH DAILY IN  THE EARLY MORNING, Disp: 90 tablet, Rfl: 0    tamsulosin (FLOMAX) 0.4 mg, Take 1 capsule (0.4 mg total) by mouth daily with dinner, Disp: 90 capsule, Rfl: 1    I have spent a total time of 35 minutes on 11/22/23 in caring for this patient including Prognosis, Risks and benefits of tx options, Instructions for management, Patient and family education, Importance of tx compliance, Risk factor reductions, Impressions, Counseling / Coordination of care, Documenting in the medical record, Reviewing / ordering tests, medicine, procedures  , and Obtaining or reviewing history  .

## 2023-11-22 NOTE — TELEPHONE ENCOUNTER
Spoke with CARLTON Bradley after she saw patient in the office this am.  She stated that patient would like to start the prosthetic process and would prefer to use [address] information was provided to patient in office.   Clinical information sent to [facility].

## 2023-11-30 ENCOUNTER — NURSING HOME VISIT (OUTPATIENT)
Dept: GERIATRICS | Facility: OTHER | Age: 63
End: 2023-11-30
Payer: COMMERCIAL

## 2023-11-30 VITALS
WEIGHT: 218.7 LBS | BODY MASS INDEX: 28.85 KG/M2 | HEART RATE: 75 BPM | SYSTOLIC BLOOD PRESSURE: 101 MMHG | OXYGEN SATURATION: 97 % | RESPIRATION RATE: 18 BRPM | DIASTOLIC BLOOD PRESSURE: 56 MMHG | TEMPERATURE: 97.5 F

## 2023-11-30 DIAGNOSIS — E78.2 MIXED HYPERLIPIDEMIA: ICD-10-CM

## 2023-11-30 DIAGNOSIS — F32.A ANXIETY AND DEPRESSION: ICD-10-CM

## 2023-11-30 DIAGNOSIS — F41.9 ANXIETY AND DEPRESSION: ICD-10-CM

## 2023-11-30 DIAGNOSIS — E11.40 TYPE 2 DIABETES MELLITUS WITH DIABETIC NEUROPATHY, WITH LONG-TERM CURRENT USE OF INSULIN (HCC): Primary | ICD-10-CM

## 2023-11-30 DIAGNOSIS — Z89.611 S/P AKA (ABOVE KNEE AMPUTATION), RIGHT (HCC): ICD-10-CM

## 2023-11-30 DIAGNOSIS — Z79.4 TYPE 2 DIABETES MELLITUS WITH DIABETIC NEUROPATHY, WITH LONG-TERM CURRENT USE OF INSULIN (HCC): Primary | ICD-10-CM

## 2023-11-30 DIAGNOSIS — I50.32 CHRONIC HEART FAILURE WITH PRESERVED EJECTION FRACTION (HCC): ICD-10-CM

## 2023-11-30 PROCEDURE — 99309 SBSQ NF CARE MODERATE MDM 30: CPT

## 2023-12-01 ENCOUNTER — NURSING HOME VISIT (OUTPATIENT)
Dept: GERIATRICS | Facility: OTHER | Age: 63
End: 2023-12-01
Payer: COMMERCIAL

## 2023-12-01 DIAGNOSIS — R68.83 CHILLS: Primary | ICD-10-CM

## 2023-12-01 PROCEDURE — 99307 SBSQ NF CARE SF MDM 10: CPT | Performed by: INTERNAL MEDICINE

## 2023-12-01 NOTE — ASSESSMENT & PLAN NOTE
Mood stable at time of assessment  Resident expressing stressors   Prosthetic for his right AKA  Continue alprazolam 0.25 mg po daily at bedtime prn  Continue supportive care while at rehab  Monitor for change in mood/behaviors

## 2023-12-01 NOTE — ASSESSMENT & PLAN NOTE
Right AKA on 10/25 with Vascular Surgery  Sutures and staples recently removed at Vascular Surgery appointment on 11/22/23  Evaluated today with no redness, drainage, or tenderness  Continue daily wound treatment  Betadine daily  Light compression with tubigrips  Follow up with Vascular Surgery on 12/08/23

## 2023-12-01 NOTE — ASSESSMENT & PLAN NOTE
lipid panel (02/07/2020)  cholesterol-130  triglycerides-198  LDL-59  HDL-31  Continue atorvastatin 80 mg po daily  Repeat lipid panel

## 2023-12-01 NOTE — ASSESSMENT & PLAN NOTE
Wt Readings from Last 3 Encounters:   11/30/23 99.2 kg (218 lb 11.2 oz)   11/06/23 99.6 kg (219 lb 8 oz)   10/26/23 103 kg (226 lb 3.1 oz)     Euvolemic   No shortness of breath  Lungs clears  2D echocardiogram 10/22: left ventricular ejection fraction 61%.  Grade 1 diastolic dysfunction  Continue furosemide 40 mg po BID

## 2023-12-01 NOTE — PROGRESS NOTES
[address]  [phone]    NAME: Edis Keith  AGE: 58 y.o. SEX: male    Progress Note    Location: [facility]   POS: 28 ([facility])  Code Status: DNR    Chief complaint / Reason for visit:  Follow-up visit    Assessment/Plan:    Chronic heart failure with preserved ejection fraction ([address])  Wt Readings from Last 3 Encounters:   11/30/23 99.2 kg (218 lb 11.2 oz)   11/06/23 99.6 kg (219 lb 8 oz)   10/26/23 103 kg (226 lb 3.1 oz)     Euvolemic   No shortness of breath  Lungs clears  2D echocardiogram 10/22: left ventricular ejection fraction 61%. Grade 1 diastolic dysfunction  Continue furosemide 40 mg po BID          S/P AKA (above knee amputation), right ([facility])  Right AKA on 10/25 with Vascular Surgery  Sutures and staples recently removed at Vascular Surgery appointment on 11/22/23  Evaluated today with no redness, drainage, or tenderness  Continue daily wound treatment  Betadine daily  Light compression with tubigrips  Follow up with Vascular Surgery on 12/08/23    Hyperlipidemia  lipid panel (02/07/2020)  cholesterol-130  triglycerides-198  LDL-59  HDL-31  Continue atorvastatin 80 mg po daily  Repeat lipid panel     Anxiety and depression  Mood stable at time of assessment  Resident expressing stressors   Prosthetic for his right AKA  Continue alprazolam 0.25 mg po daily at bedtime prn  Continue supportive care while at rehab  Monitor for change in mood/behaviors     Type 2 diabetes mellitus with diabetic neuropathy, with long-term current use of insulin ([facility])    Lab Results   Component Value Date    HGBA1C 6.7 (A) 02/02/2023     Continue insulin lantus 40 units SQ BID  Blood sugars reviewed from facility records  Acceptable  Continue to monitor  Encourage CCD      This is a 58 y.o. male seen today at [facility].   Medical history includes, not limited to, s/p right AKA, history of left BKA, type 2 DM, peripheral vascular disease, gastroparesis, CHF, hyperlipidemia, GERD, anxiety, vitamin deficiency, and atherosclerotic heart disease. Resident with a recent hospitalization from 10/19-11/01. He presented to the hospital with a right foot wound. He was evaluated by podiatry and vascular surgery. The patient's foot was deemed nonsalvageable with recommendation from podiatry for a right AKA. On 10/25 the patient was taken to surgery by Vascular Surgery. Resident is seen today for a follow up visit, he is now a long-term resident of [facility]. Upon entering the room he is laying comfortably in bed. He is alert and oriented x 3, able to answer all questions appropriately. He states he is doing well with therapy. Currently denies pain. His right aka is evaluated, skin and incision have healed well, staples and sutures have been removed. He continues to wear compression on his right leg. He has questions regarding the possibility of having a prosthetic for the right leg. Reviewed resident with nursing staff. No concerns at this time. Nursing and prior provider notes reviewed on this visit. Discussed visit with PCP and nursing staff/ supervisor. Review of Systems   Constitutional:  Positive for activity change. Negative for appetite change, fatigue and fever. HENT:  Negative for congestion and rhinorrhea. Eyes:  Negative for pain and visual disturbance. Respiratory:  Negative for cough and shortness of breath. Cardiovascular:  Negative for chest pain and leg swelling. Gastrointestinal:  Negative for abdominal pain and constipation. Genitourinary:  Negative for difficulty urinating and dysuria. Musculoskeletal:  Positive for gait problem (bilateral BKA). Negative for arthralgias. Skin:  Negative for color change and rash. Neurological:  Negative for dizziness, syncope and weakness. Psychiatric/Behavioral:  Negative for behavioral problems and confusion.     All other systems reviewed and are negative. ALLERGY: Reviewed, unchanged  No Known Allergies    HISTORY:  Medical Hx: Reviewed, unchanged  Family Hx: Reviewed, unchanged  Soc Hx: Reviewed,  unchanged      Physical Exam  Vitals reviewed. Constitutional:       General: He is not in acute distress. Appearance: Normal appearance. He is not ill-appearing. HENT:      Head: Normocephalic. Right Ear: Tympanic membrane normal.      Left Ear: Tympanic membrane normal.      Nose: Nose normal. No congestion. Mouth/Throat:      Mouth: Mucous membranes are moist.      Pharynx: Oropharynx is clear. Eyes:      General:         Right eye: No discharge. Left eye: No discharge. Extraocular Movements: Extraocular movements intact. Conjunctiva/sclera: Conjunctivae normal.      Pupils: Pupils are equal, round, and reactive to light. Cardiovascular:      Rate and Rhythm: Normal rate and regular rhythm. Pulses: Normal pulses. Heart sounds: Normal heart sounds. Pulmonary:      Effort: Pulmonary effort is normal. No respiratory distress. Breath sounds: Normal breath sounds. Chest:      Chest wall: No tenderness. Abdominal:      General: Bowel sounds are normal.      Palpations: Abdomen is soft. Tenderness: There is no abdominal tenderness. Musculoskeletal:         General: Deformity present. No swelling. Normal range of motion. Cervical back: Normal range of motion. Right lower leg: No edema. Left lower leg: No edema. Skin:     General: Skin is warm and dry. Neurological:      Mental Status: He is alert and oriented to person, place, and time. Mental status is at baseline. Motor: No weakness. Psychiatric:         Mood and Affect: Mood normal.         Behavior: Behavior normal.         Thought Content:  Thought content normal.         Judgment: Judgment normal.            Laboratory results / Imaging reviewed: Hard copy/ies in medical chart:    Vitals: 11/30/23 1956   BP: 101/56   Pulse: 75   Resp: 18   Temp: 97.5 °F (36.4 °C)   SpO2: 97%       Current Medications: All medications reviewed and updated in Nursing Home Chart    Please note: This note was completed in part utilizing a voice-recognition software may have been used in the preparation of this document. Grammatical errors, random word insertion, spelling mistakes, and incomplete sentences may be an occasional consequence of the system secondary to software limitations, ambient noise and hardware issues. Occasional wrong word or "sound-alike" substitutions may have occurred due to the inherent limitations of voice recognition software. At the time of dictation, efforts were made to edit, clarify and/or correct errors. Interpretation should be guided by context. Please read the chart carefully and recognize, using context, where substitutions have occurred. If you have any questions or concerns about the context, text or information contained within the body of this dictation, please contact myself, the provider, for further clarification.       CARLTON Cantor  11/30/2023 Labs/Imaging Studies/Medications

## 2023-12-02 ENCOUNTER — TELEPHONE (OUTPATIENT)
Dept: OTHER | Facility: OTHER | Age: 63
End: 2023-12-02

## 2023-12-02 NOTE — TELEPHONE ENCOUNTER
[address] [place] [address] from [facility]. Gonzalez Fermin was paged via TC    Please allow the on-call provider 20-30 mins to respond. If you have not heard from them within that time, please feel free to call back.

## 2023-12-02 NOTE — PROGRESS NOTES
[address]  [phone]) 6989 [facility]  POS 32      NAME: Felipa Kelly  AGE: 58 y.o. SEX: male [phone]    DATE OF ENCOUNTER: 12/1/2023    Assessment and Plan     1. Chills           Chills  Pt with temp 100  Pt with n/v & hyperactive bowel sounds  Pt eating less  Encourage po fluids  Cont prn zofran  Given pt looks ill, check cbc with diff, cmp, blood cx x 2, ua, ur cx       Code status: DNR/DNI    Chief Complaint     Acute follow-up visit. History of Present Illness   Asked by nurse to see pt as pt c/o n/v. Pt also with temp 100    Ordered cbc with diff stat, bmp, ua, blood cx  Ur cx    Pt appears ill; pt wrapping himself in his blanket. The following portions of the patient's history were reviewed and updated as appropriate: allergies, current medications, past family history, past medical history, past social history, past surgical history and problem list.    Review of Systems     Review of Systems   Constitutional:  Positive for chills, fatigue and fever. Gastrointestinal:  Positive for nausea and vomiting. All other systems reviewed and are negative.     Active Problem List     Patient Active Problem List   Diagnosis    Anxiety and depression    Type 2 diabetes mellitus with diabetic neuropathy, with long-term current use of insulin ([address])    Hyperlipidemia    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    Vitamin D deficiency    PAD (peripheral artery disease) ([facility])    S/P BKA (below knee amputation), left (HCC)    Orthostatic hypotension    Diabetic gastroparesis     Class 2 severe obesity due to excess calories with serious comorbidity and body mass index (BMI) of 35.0 to 35.9 in adult     Triple vessel coronary artery disease    Hypertensive heart disease with congestive heart failure (HCC)    Chronic heart failure with preserved ejection fraction (HCC)    S/P CABG x 3    Diabetic autonomic neuropathy associated with type 2 diabetes mellitus (HCC)    Hyponatremia    Obstructive sleep apnea    Phantom limb syndrome without pain (HCC)    CAD (coronary artery disease)    Nodular rash    Elephantiasis nostras verrucosa    Embolism and thrombosis of arteries of the lower extremities ([facility])    Lymphedema of right lower extremity    Venous stasis dermatitis of right lower extremity    Abdominal distension    Non-pressure chronic ulcer of right calf with fat layer exposed ([address])    Cellulitis of right ankle    Lymphedema in adult patient    Diabetic ulcer of right heel ([facility])    Loose stools    Elevated troponin    Sepsis ([address])    Bacteremia due to Proteus species    Gangrene ([facility])    Anemia    S/P AKA (above knee amputation), right (HCC)    Chills       Objective     Vitals: Reviewed in [employer] system. VSS    General: Awake, alert & oriented x 3; ill appearing  Head: atraumatic, normocephalic  Cardiovascular: RRR  Lungs: Clear to auscultation bilaterally  Abdomen: nontender/nondistended, + hyperactive BS  Legs: no cyanosis, clubbing; pt with known Rt AKA Lt BKA  Skin: clean, dry  Psych: calm    Pertinent Laboratory/Diagnostic Studies:  Refer to facility chart.     12/1/2023:  CBC  HEMOGLOBIN 11.2 g/dL 12.5-17.0 L Final              HEMATOCRIT 33.9 % 37.0-48.0 L Final             WBC 7.7 thou/cmm 4.0-10.5  Final             RBC 4.03 mill/cmm 4.00-5.40  Final             PLATELET COUNT 546       CMP:            GLUCOSE 144 mg/dL 65-99 H Final              BUN 13 mg/dL 7-28  Final             CREATININE 0.82 mg/dL 0.53-1.30  Final             SODIUM 136 mmol/L 135-145  Final             POTASSIUM 3.8 mmol/L 3.5-5.2  Final             CHLORIDE 96 mmol/L 100-109 L Final             CARBON DIOXIDE 32 mmol/L 21-31 H Final             CALCIUM 8.6 mg/dL 8.5-10.1  Final             ALKALINE PHOSPHATASE  Panel/Test: Alkaline phosphatase  [facility] Code: 6768-6  Lab Test Description: ALKALINE PHOSPHATASE    Specimen #: Specimen Source:   Specimen Site Modifier:   Collection Volume:   No. of Sample Containers:     Observation Method:   Dania Zavala of Abnormality:  59 U/L   Final             ALBUMIN 3.2 g/dL 3.5-5.7 L Final             BILIRUBIN,TOTAL 0.5 mg/dL 0.2-1.0  Final     Eltrombopag and its metabolites may interfere with this assay causing   erroneously high patient results. PROTEIN, TOTAL 6.8 g/dL 6.3-8.3  Final             AST 13 U/L <41  Final             ALT 12 U/L <56  Final             ANION GAP 8  3-11  Final                 Current Medications   Medications reviewed and updated in facility chart.     Andrews Garcia MD  Internal Medicine  Senior Care Physician

## 2023-12-03 PROBLEM — R68.83 CHILLS: Status: ACTIVE | Noted: 2023-12-01

## 2023-12-03 NOTE — ASSESSMENT & PLAN NOTE
Pt with temp 100  Pt with n/v & hyperactive bowel sounds  Pt eating less  Encourage po fluids  Cont prn zofran  Given pt looks ill, check cbc with diff, cmp, blood cx x 2, ua, ur cx

## 2023-12-08 ENCOUNTER — OFFICE VISIT (OUTPATIENT)
Dept: VASCULAR SURGERY | Facility: CLINIC | Age: 63
End: 2023-12-08

## 2023-12-08 VITALS
HEART RATE: 72 BPM | BODY MASS INDEX: 28.85 KG/M2 | OXYGEN SATURATION: 97 % | HEIGHT: 73 IN | SYSTOLIC BLOOD PRESSURE: 114 MMHG | DIASTOLIC BLOOD PRESSURE: 62 MMHG

## 2023-12-08 DIAGNOSIS — Z89.611 S/P AKA (ABOVE KNEE AMPUTATION), RIGHT (HCC): Primary | ICD-10-CM

## 2023-12-08 PROCEDURE — 99024 POSTOP FOLLOW-UP VISIT: CPT | Performed by: NURSE PRACTITIONER

## 2023-12-08 NOTE — ASSESSMENT & PLAN NOTE
Pt returns to the office for post-op incision check. Pt presents to the office from [facility] in a wheelchair with left leg prosthetic. He has no complaints in the office today. Reports that he feels his incision site is healing well. R AKA incision site is clean, dry and completely healed. Pt is clear for total weight bearing to the R AKA site. Recommendations  -Return to the office as needed. -OK to begin limb  and prosthetic  -OK for total weight bearing to the R AKA.    -Patient instructed to notify the office should he develop any warmth, erythema, drainage, or pain to incision.  -Continue aspirin, clopidogrel, and atorvastatin

## 2023-12-08 NOTE — PATIENT INSTRUCTIONS
-Return to the office as needed. -OK to begin limb  and prosthetic. OK to stop dressing changes. -OK for total weight bearing to the R AKA.      -Patient instructed to notify the office should he develop any warmth, erythema, drainage, or pain to incision.    -Continue aspirin, clopidogrel, and atorvastatin

## 2023-12-08 NOTE — PROGRESS NOTES
71-year-old male, former smoker, with PMH type II DM, HLD, HTN, obesity, CAD s/p CABG x 3, HEMA, lymphedema, and PAD s/p bilateral lower extremity amputations. Patient is presenting to the vascular surgery office for surgical site incision check s/p R AKA 10/25/23 Mariah PeeBubba. Assessment/Plan:    S/P AKA (above knee amputation), right (HCC)  Pt returns to the office for post-op incision check. Pt presents to the office from [facility] in a wheelchair with left leg prosthetic. He has no complaints in the office today. Reports that he feels his incision site is healing well. R AKA incision site is clean, dry and completely healed. Pt is clear for total weight bearing to the R AKA site. Recommendations  -Return to the office as needed. -OK to begin limb  and prosthetic  -OK for total weight bearing to the R AKA. -Patient instructed to notify the office should he develop any warmth, erythema, drainage, or pain to incision.  -Continue aspirin, clopidogrel, and atorvastatin       Diagnoses and all orders for this visit:    S/P AKA (above knee amputation), right (HCC)  -     Limb   -     Prosthetic    Other orders  -     bisacodyl (FLEET) 10 MG/30ML ENEM; Insert 10 mg into the rectum once  -     magnesium hydroxide (MILK OF MAGNESIA) 400 mg/5 mL oral suspension; Take 30 mL by mouth daily as needed for constipation          Subjective:      Patient ID: Bill Green is a 58 y.o. male. Patient is here today for a 2 week wound check s/p a Right AKA done 10/25/2023 by Dr. Anamika Davis. Patient has a history of a Left BKA done 8/3/2018 by Dr. Meredith Yoder. Patient's incision site is clean and dry and well healed. He denies any pain. Patient is taking ASA 81 mg, Plavix and Atorvastatin. Patient is a former smoker. Patient is a 71-year-old male, former smoker, with PMH type II DM, HLD, HTN, obesity, CAD s/p CABG x 3, HEMA, lymphedema, and PAD s/p bilateral lower extremity amputations.  Patient is presenting to the vascular surgery office for initial post-op evaluation and staple removal s/p R AKA 10/25/23 Marcos Mason). He is currently living at [facility] and receiving rehab through the nursing home 3 times a week. He reports that he is doing well and offers no complaints in the office today. The following portions of the patient's history were reviewed and updated as appropriate: allergies, current medications, past family history, past medical history, past social history, past surgical history, and problem list.    Review of Systems   Constitutional: Negative. HENT: Negative. Eyes: Negative. Respiratory: Negative. Negative for shortness of breath. Cardiovascular: Negative. Negative for chest pain. Gastrointestinal:  Positive for nausea. Endocrine: Negative. Genitourinary: Negative. Musculoskeletal: Negative. Skin: Negative. Allergic/Immunologic: Negative. Neurological: Negative. Hematological: Negative. Psychiatric/Behavioral: Negative. Objective:      /62 (BP Location: Left arm, Patient Position: Sitting, Cuff Size: Standard)   Pulse 72   Ht 6' 1" (1.854 m)   SpO2 97%   BMI 28.85 kg/m²          Physical Exam  Vitals and nursing note reviewed. Constitutional:       Appearance: Normal appearance. HENT:      Head: Normocephalic and atraumatic. Cardiovascular:      Rate and Rhythm: Normal rate and regular rhythm. Pulses:           Radial pulses are 1+ on the right side and 1+ on the left side. Pulmonary:      Effort: Pulmonary effort is normal. No respiratory distress. Breath sounds: Normal breath sounds. Musculoskeletal:         General: No swelling or tenderness. Right Lower Extremity: Right leg is amputated above knee. Left Lower Extremity: Left leg is amputated below knee. Skin:     General: Skin is dry. Neurological:      General: No focal deficit present.       Mental Status: He is alert and oriented to person, place, and time. Sensory: Sensory deficit (B/L hands) present. Gait: Gait abnormal (L leg prosthetic, Wheelchair. ). Psychiatric:         Mood and Affect: Mood normal.         Behavior: Behavior normal.           I have reviewed and made appropriate changes to the review of systems input by the medical assistant.     Vitals:    12/08/23 1321   BP: 114/62   BP Location: Left arm   Patient Position: Sitting   Cuff Size: Standard   Pulse: 72   SpO2: 97%   Height: 6' 1" (1.854 m)       Patient Active Problem List   Diagnosis    Anxiety and depression    Type 2 diabetes mellitus with diabetic neuropathy, with long-term current use of insulin ([facility])    Hyperlipidemia    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    Vitamin D deficiency    PAD (peripheral artery disease) ([facility])    S/P BKA (below knee amputation), left ([facility])    Orthostatic hypotension    Diabetic gastroparesis     Class 2 severe obesity due to excess calories with serious comorbidity and body mass index (BMI) of 35.0 to 35.9 in adult     Triple vessel coronary artery disease    Hypertensive heart disease with congestive heart failure ([facility])    Chronic heart failure with preserved ejection fraction ([facility])    S/P CABG x 3    Diabetic autonomic neuropathy associated with type 2 diabetes mellitus ([facility])    Hyponatremia    Obstructive sleep apnea    Phantom limb syndrome without pain ([facility])    CAD (coronary artery disease)    Nodular rash    Elephantiasis nostras verrucosa    Embolism and thrombosis of arteries of the lower extremities ([facility])    Lymphedema of right lower extremity    Venous stasis dermatitis of right lower extremity    Abdominal distension    Non-pressure chronic ulcer of right calf with fat layer exposed ([facility])    Cellulitis of right ankle    Lymphedema in adult patient    Diabetic ulcer of right heel ([facility])    Loose stools    Elevated troponin    Sepsis ([address])    Bacteremia due to Proteus species    Gangrene (HCC)    Anemia    S/P AKA (above knee amputation), right (HCC)    Chills       Past Surgical History:   Procedure Laterality Date    AMPUTATION ABOVE KNEE (AKA) Right 10/25/2023    Procedure: (AKA);   Surgeon: Rm Salguero MD;  Location: [facility];  Service: Vascular    CORONARY ARTERY BYPASS GRAFT      EYE SURGERY      cataracts bilateral    LEG AMPUTATION THROUGH LOWER TIBIA AND FIBULA Left 8/3/2018    Procedure: AMPUTATION BELOW KNEE (BKA) L BKA;  Surgeon: Vinay Esposito MD;  Location: [facility];  Service: Vascular    [place] CORONARY ARTERY BYP W/VEIN & ARTERY GRAFT 4 VEIN N/A 3/28/2018    Procedure: CORONARY ARTERY BYPASS GRAFT (CABG) x3 VESSELS, LIMA TO LAD, SVG--> PDA, SVG--> OM2,  RIGHT LEG EVH/SVH TO , INTRA-OP AMANDEEP;  Surgeon: Katherine Gonzales MD;  Location: [facility];  Service: Cardiac Surgery    ROTATOR CUFF REPAIR Bilateral        Family History   Problem Relation Age of Onset    Atrial fibrillation Mother     Stroke Mother     Hypertension Father     Heart attack Paternal Grandfather 61    Diabetes Maternal Grandmother     Diabetes Maternal Grandfather     Diabetes Maternal Aunt     Diabetes Maternal Uncle        Social History     Socioeconomic History    Marital status: /Civil Union     Spouse name: Not on file    Number of children: Not on file    Years of education: Not on file    Highest education level: Not on file   Occupational History    Not on file   Tobacco Use    Smoking status: Former     Packs/day: 1.00     Years: 12.00     Total pack years: 12.00     Types: Cigarettes     Quit date: 3/23/1994     Years since quittin.7    Smokeless tobacco: Never   Vaping Use    Vaping Use: Never used   Substance and Sexual Activity    Alcohol use: Never     Comment: rarely    Drug use: No    Sexual activity: Not Currently   Other Topics Concern    Not on file   Social History Narrative    Not on file     Social Determinants of Health     Financial Resource Strain: Not on file Food Insecurity: No Food Insecurity (10/22/2023)    Hunger Vital Sign     Worried About Running Out of Food in the Last Year: Never true     Ran Out of Food in the Last Year: Never true   Transportation Needs: No Transportation Needs (10/22/2023)    PRAPARE - Transportation     Lack of Transportation (Medical): No     Lack of Transportation (Non-Medical): No   Physical Activity: Not on file   Stress: Not on file   Social Connections: Moderately Isolated (10/29/2020)    Social Connection and Isolation Panel [NHANES]     Frequency of Communication with Friends and Family: Three times a week     Frequency of Social Gatherings with Friends and Family:  Three times a week     Attends Temple Services: Never     Active Member of Clubs or Organizations: No     Attends Club or Organization Meetings: Never     Marital Status:    Intimate Partner Violence: Not At Risk (10/29/2020)    Humiliation, Afraid, Rape, and Kick questionnaire     Fear of Current or Ex-Partner: No     Emotionally Abused: No     Physically Abused: No     Sexually Abused: No   Housing Stability: Low Risk  (10/22/2023)    Housing Stability Vital Sign     Unable to Pay for Housing in the Last Year: No     Number of [address] in the Last Year: 1     Unstable Housing in the Last Year: No       No Known Allergies      Current Outpatient Medications:     acetaminophen (TYLENOL) 325 mg tablet, Take 650 mg by mouth every 4 (four) hours as needed for mild pain, moderate pain or fever, Disp: , Rfl:     ALPRAZolam (XANAX) 0.25 mg tablet, Take 0.25 mg by mouth daily at bedtime as needed for anxiety or sleep, Disp: , Rfl:     aspirin (ECOTRIN LOW STRENGTH) 81 mg EC tablet, Take 81 mg by mouth daily, Disp: , Rfl:     atorvastatin (LIPITOR) 80 mg tablet, Take 1 tablet (80 mg total) by mouth daily, Disp: 90 tablet, Rfl: 1    bisacodyl (FLEET) 10 MG/30ML ENEM, Insert 10 mg into the rectum once, Disp: , Rfl:     cholecalciferol (VITAMIN D3) 1,000 units tablet, Take 1,000 Units by mouth daily, Disp: , Rfl:     clopidogrel (PLAVIX) 75 mg tablet, TAKE 1 TABLET BY MOUTH DAILY, Disp: 90 tablet, Rfl: 0    finasteride (PROSCAR) 5 mg tablet, TAKE 1 TABLET BY MOUTH DAILY, Disp: 100 tablet, Rfl: 2    furosemide (LASIX) 40 mg tablet, TAKE 1 TABLET BY MOUTH TWICE  DAILY, Disp: 200 tablet, Rfl: 2    glucose blood test strip, ReliOn Prime Meter, Disp: , Rfl:     Insulin Glargine Solostar (Lantus SoloStar) 100 UNIT/ML SOPN, Inject 0.4 mL (40 Units total) under the skin 2 (two) times a day This is dosing for rehab on discharge, Disp: , Rfl: 0    magnesium hydroxide (MILK OF MAGNESIA) 400 mg/5 mL oral suspension, Take 30 mL by mouth daily as needed for constipation, Disp: , Rfl:     metoclopramide (REGLAN) 5 mg tablet, Take 1 tablet (5 mg total) by mouth daily, Disp: 90 tablet, Rfl: 1    pantoprazole (PROTONIX) 40 mg tablet, TAKE 1 TABLET BY MOUTH DAILY IN  THE EARLY MORNING, Disp: 90 tablet, Rfl: 0    tamsulosin (FLOMAX) 0.4 mg, Take 1 capsule (0.4 mg total) by mouth daily with dinner, Disp: 90 capsule, Rfl: 1  I have spent a total time of 25 minutes on 12/08/23 in caring for this patient including Risks and benefits of tx options, Instructions for management, Patient and family education, Documenting in the medical record, Reviewing / ordering tests, medicine, procedures  , and Obtaining or reviewing history  .

## 2023-12-22 ENCOUNTER — TELEPHONE (OUTPATIENT)
Dept: OTHER | Facility: OTHER | Age: 63
End: 2023-12-22

## 2023-12-23 NOTE — TELEPHONE ENCOUNTER
"Sydni stated, \"The pt requested a call from the resident regarding loose stoles.\" On call notified via TC  "

## 2023-12-27 ENCOUNTER — NURSING HOME VISIT (OUTPATIENT)
Dept: GERIATRICS | Facility: OTHER | Age: 63
End: 2023-12-27

## 2023-12-27 DIAGNOSIS — R11.14 BILIOUS VOMITING WITH NAUSEA: ICD-10-CM

## 2023-12-27 DIAGNOSIS — R14.0 ABDOMINAL DISTENSION: ICD-10-CM

## 2023-12-27 DIAGNOSIS — R19.5 LOOSE STOOLS: ICD-10-CM

## 2023-12-27 DIAGNOSIS — U07.1 COVID-19: Primary | ICD-10-CM

## 2023-12-27 DIAGNOSIS — E11.43 DIABETIC GASTROPARESIS: Chronic | ICD-10-CM

## 2023-12-27 DIAGNOSIS — K31.84 DIABETIC GASTROPARESIS: Chronic | ICD-10-CM

## 2023-12-29 VITALS
TEMPERATURE: 97.5 F | BODY MASS INDEX: 28.74 KG/M2 | WEIGHT: 217.8 LBS | OXYGEN SATURATION: 98 % | RESPIRATION RATE: 18 BRPM | HEART RATE: 76 BPM | DIASTOLIC BLOOD PRESSURE: 79 MMHG | SYSTOLIC BLOOD PRESSURE: 118 MMHG

## 2023-12-29 PROBLEM — R11.14 BILIOUS VOMITING WITH NAUSEA: Status: ACTIVE | Noted: 2023-12-29

## 2023-12-29 PROBLEM — A41.9 SEPSIS (HCC): Status: RESOLVED | Noted: 2023-10-20 | Resolved: 2023-12-29

## 2023-12-29 PROBLEM — U07.1 COVID-19: Status: ACTIVE | Noted: 2023-12-29

## 2023-12-29 RX ORDER — SENNOSIDES A AND B 8.6 MG/1
1 TABLET, FILM COATED ORAL DAILY PRN
COMMUNITY

## 2023-12-30 NOTE — ASSESSMENT & PLAN NOTE
Nausea with dry heaves  Resident states started 3 days ago  Decreased appetite  Able to keep down fluids

## 2023-12-30 NOTE — ASSESSMENT & PLAN NOTE
Recently given loperamide for loose stools  No bowel movements for the past 3 days  Abdomen distended on exam today  Reports of N/dry heaves  Decreased appetite   Administer bisacodyl rectal suppository  Monitor bowel movements

## 2023-12-30 NOTE — ASSESSMENT & PLAN NOTE
Recent loose stools and received loperamide on 12/22  Would advise against in the future as resident has had no recorded bowel movements for the past 3 days  Would consider conservative measures with banatrol flakes or floraster   Abdomen distended on assessment

## 2023-12-30 NOTE — ASSESSMENT & PLAN NOTE
Started on trial of metoclopramide 5 mg po daily in hospital  Nausea with dry heaving on assessment  Continue to monitor

## 2023-12-30 NOTE — PROGRESS NOTES
[address], [address], [place] [address]  [phone]    NAME: Abner King  AGE: 63 y.o. SEX: male    Progress Note    Location: [facility]   POS: 32 ([facility])  Code Status: DNR    Chief complaint / Reason for visit:  Acute Visit    Assessment/Plan:    Diabetic gastroparesis   Started on trial of metoclopramide 5 mg po daily in hospital  Nausea with dry heaving on assessment  Continue to monitor     Bilious vomiting with nausea  Nausea with dry heaves  Resident states started 3 days ago  Decreased appetite  Able to keep down fluids    Loose stools  Recent loose stools and received loperamide on 12/22  Would advise against in the future as resident has had no recorded bowel movements for the past 3 days  Would consider conservative measures with banatrol flakes or floraster   Abdomen distended on assessment    Abdominal distension  Recently given loperamide for loose stools  No bowel movements for the past 3 days  Abdomen distended on exam today  Reports of N/dry heaves  Decreased appetite   Administer bisacodyl rectal suppository  Monitor bowel movements    COVID-19  Symptoms of low grade fever, nausea, decreased appetite, dry heaves starting 3 days ago  Tested with rapid covid swab at [facility] testing positive  Vitals (temp, pulse ox, HR) stable  97.6, 97%, 84  Currently on RA  Will start on paxlovid  Monitor vitals and repeat blood work next week      This is a 63 y.o. male seen today at [facility].  Medical history includes, not limited to, s/p right AKA, history of left BKA, type 2 DM, peripheral vascular disease, gastroparesis, CHF, hyperlipidemia, GERD, anxiety, vitamin deficiency, and atherosclerotic heart disease.      Resident is seen today for an acute visit. Nursing staff reporting resident with recent low grade fever, nausea, dry heaving, decreased appetite. Upon entering the room he is laying in bed.  He is alert and oriented x 3, able to answer all  questions appropriately.  He reports symptoms started right before [place].  He reports having nausea, lack of appetite. States he has not had a bowel movement in a couple of days.  Is also requesting to be covid swabbed.    Covid swab completed by nursing staff with resident testing positive.      Reviewed resident with nursing staff. New orders placed.         Nursing and prior provider notes reviewed on this visit. Discussed visit with PCP and nursing staff/ supervisor.      Review of Systems   Constitutional:  Positive for activity change, appetite change and fatigue. Negative for fever.   HENT:  Negative for congestion and rhinorrhea.    Eyes:  Negative for pain and visual disturbance.   Respiratory:  Negative for cough and shortness of breath.    Cardiovascular:  Negative for chest pain and leg swelling.   Gastrointestinal:  Positive for nausea and vomiting. Negative for abdominal pain and constipation.   Genitourinary:  Negative for difficulty urinating and dysuria.   Musculoskeletal:  Positive for gait problem (bilateral BKA). Negative for arthralgias.   Skin:  Negative for color change and rash.   Neurological:  Negative for dizziness, syncope and weakness.   Psychiatric/Behavioral:  Negative for behavioral problems and confusion.    All other systems reviewed and are negative.         ALLERGY: Reviewed, unchanged  No Known Allergies    HISTORY:  Medical Hx: Reviewed, unchanged  Family Hx: Reviewed, unchanged  Soc Hx: Reviewed,  unchanged      Physical Exam  Vitals reviewed.   Constitutional:       General: He is not in acute distress.     Appearance: Normal appearance. He is not ill-appearing.   HENT:      Head: Normocephalic.      Right Ear: Tympanic membrane normal.      Left Ear: Tympanic membrane normal.      Nose: Nose normal. No congestion.      Mouth/Throat:      Mouth: Mucous membranes are moist.      Pharynx: Oropharynx is clear.   Eyes:      General:         Right eye: No discharge.          "Left eye: No discharge.      Extraocular Movements: Extraocular movements intact.      Conjunctiva/sclera: Conjunctivae normal.      Pupils: Pupils are equal, round, and reactive to light.   Cardiovascular:      Rate and Rhythm: Normal rate and regular rhythm.      Pulses: Normal pulses.      Heart sounds: Normal heart sounds.   Pulmonary:      Effort: Pulmonary effort is normal. No respiratory distress.      Breath sounds: Normal breath sounds.   Chest:      Chest wall: No tenderness.   Abdominal:      General: Bowel sounds are normal. There is distension.      Palpations: Abdomen is soft.      Tenderness: There is no abdominal tenderness.   Musculoskeletal:         General: Deformity present. No swelling. Normal range of motion.      Cervical back: Normal range of motion.      Right lower leg: No edema.      Left lower leg: No edema.   Skin:     General: Skin is warm and dry.   Neurological:      Mental Status: He is alert and oriented to person, place, and time. Mental status is at baseline.      Motor: No weakness.   Psychiatric:         Mood and Affect: Mood normal.         Behavior: Behavior normal.         Thought Content: Thought content normal.         Judgment: Judgment normal.            Laboratory results / Imaging reviewed: Hard copy/ies in medical chart:    Vitals:    12/29/23 2039   BP: 118/79   Pulse: 76   Resp: 18   Temp: 97.5 °F (36.4 °C)   SpO2: 98%       Current Medications:  All medications reviewed and updated in Nursing Home Chart    Please note: This note was completed in part utilizing a voice-recognition software may have been used in the preparation of this document. Grammatical errors, random word insertion, spelling mistakes, and incomplete sentences may be an occasional consequence of the system secondary to software limitations, ambient noise and hardware issues. Occasional wrong word or \"sound-alike\" substitutions may have occurred due to the inherent limitations of voice recognition " software. At the time of dictation, efforts were made to edit, clarify and/or correct errors. Interpretation should be guided by context. Please read the chart carefully and recognize, using context, where substitutions have occurred. If you have any questions or concerns about the context, text or information contained within the body of this dictation, please contact myself, the provider, for further clarification.      CARLTON Givens  12/29/2023

## 2023-12-30 NOTE — ASSESSMENT & PLAN NOTE
Symptoms of low grade fever, nausea, decreased appetite, dry heaves starting 3 days ago  Tested with rapid covid swab at [facility] testing positive  Vitals (temp, pulse ox, HR) stable  97.6, 97%, 84  Currently on RA  Will start on paxlovid  Monitor vitals and repeat blood work next week

## 2024-01-03 ENCOUNTER — NURSING HOME VISIT (OUTPATIENT)
Dept: GERIATRICS | Facility: OTHER | Age: 64
End: 2024-01-03
Payer: COMMERCIAL

## 2024-01-03 DIAGNOSIS — E78.2 MIXED HYPERLIPIDEMIA: ICD-10-CM

## 2024-01-03 DIAGNOSIS — K31.84 DIABETIC GASTROPARESIS: Primary | Chronic | ICD-10-CM

## 2024-01-03 DIAGNOSIS — U07.1 COVID-19: ICD-10-CM

## 2024-01-03 DIAGNOSIS — F32.A ANXIETY AND DEPRESSION: ICD-10-CM

## 2024-01-03 DIAGNOSIS — F41.9 ANXIETY AND DEPRESSION: ICD-10-CM

## 2024-01-03 DIAGNOSIS — I25.10 CORONARY ARTERY DISEASE INVOLVING NATIVE CORONARY ARTERY OF NATIVE HEART WITHOUT ANGINA PECTORIS: ICD-10-CM

## 2024-01-03 DIAGNOSIS — E11.40 TYPE 2 DIABETES MELLITUS WITH DIABETIC NEUROPATHY, WITH LONG-TERM CURRENT USE OF INSULIN (HCC): ICD-10-CM

## 2024-01-03 DIAGNOSIS — I73.9 PAD (PERIPHERAL ARTERY DISEASE) (HCC): ICD-10-CM

## 2024-01-03 DIAGNOSIS — D64.9 ANEMIA, UNSPECIFIED TYPE: ICD-10-CM

## 2024-01-03 DIAGNOSIS — Z79.4 TYPE 2 DIABETES MELLITUS WITH DIABETIC NEUROPATHY, WITH LONG-TERM CURRENT USE OF INSULIN (HCC): ICD-10-CM

## 2024-01-03 DIAGNOSIS — E11.43 DIABETIC GASTROPARESIS: Primary | Chronic | ICD-10-CM

## 2024-01-03 DIAGNOSIS — E87.6 HYPOKALEMIA: ICD-10-CM

## 2024-01-03 DIAGNOSIS — Z71.89 ADVANCE CARE PLANNING: ICD-10-CM

## 2024-01-03 DIAGNOSIS — I50.32 CHRONIC HEART FAILURE WITH PRESERVED EJECTION FRACTION (HCC): ICD-10-CM

## 2024-01-03 PROCEDURE — 99309 SBSQ NF CARE MODERATE MDM 30: CPT | Performed by: STUDENT IN AN ORGANIZED HEALTH CARE EDUCATION/TRAINING PROGRAM

## 2024-01-04 NOTE — ASSESSMENT & PLAN NOTE
Verbalizes HCP as wife Ema  Extensive discussion/education with patient today regarding their wishes with respect to resuscitative measures; discussed potential risks vs benefits of resuscitative measures; patient verbalizes understanding, appears to have capacity to make this decision presently, and clearly verbalizes a desire for DNR/DNI, in accordance with prior

## 2024-01-04 NOTE — ASSESSMENT & PLAN NOTE
Lab Results   Component Value Date    HGBA1C 6.7 (A) 02/02/2023     Monitor glucose - generally well controlled in low-mid 100s  Avoid hypoglycemia  Continue lantus 40u BID with hold parameters  Encourage CCD  Continue to monitor

## 2024-01-04 NOTE — PROGRESS NOTES
[facility]  Facility: [facility]    PROGRESS NOTE  Nursing Home Place of Service: nursing home place of service: POS 32 Unskilled- No Part A Coverage    NAME: Abner King  : 1960 AGE: 63 y.o. SEX: male MRN: [id number]  DATE OF ENCOUNTER: 1/3/2024    Assessment and Plan     Problem List Items Addressed This Visit       Anxiety and depression     Mood stable  Stressors include health complications, amputations  Continue alprazolam 0.25 mg po daily at bedtime prn  Continue supportive care while at rehab  Monitor for change in mood/behaviors          Type 2 diabetes mellitus with diabetic neuropathy, with long-term current use of insulin ([facility])       Lab Results   Component Value Date    HGBA1C 6.7 (A) 2023     Monitor glucose - generally well controlled in low-mid 100s  Avoid hypoglycemia  Continue lantus 40u BID with hold parameters  Encourage CCD  Continue to monitor         Hyperlipidemia     Continue statin         PAD (peripheral artery disease) ([facility])     S/p right AKA on 10/25/23  Continue aspirin, statin, plavix         Diabetic gastroparesis  - Primary (Chronic)       Lab Results   Component Value Date    HGBA1C 6.7 (A) 2023     Seems stable  Recommend avoidance of imodium due to constipation risk in his case  Consider banana flakes instead if needed  Monitor stool output - recently seems stable  Bowel regimen at facility: continue bowel regimen protocol as ordered; bisacodyl suppository PRN if no BM in 2-3 days  Encourage mobility as tolerated, PO hydration as appropriate, high fiber diet/prune juice (in outpatient setting as appropriate)  Goal is for 1 easy BM every 1-2 days           Chronic heart failure with preserved ejection fraction (HCC)     Wt Readings from Last 3 Encounters:   23 98.8 kg (217 lb 12.8 oz)   23 99.2 kg (218 lb 11.2 oz)   23 99.6 kg (219 lb 8 oz)     strike-out   2023 14:36 217.8 Lbs Wheelchair mnunez (Manual)      12/21/2023 10:17 by Miguelina Whyte  isidra   strike-out   11/29/2023 14:47 218.7 Lbs Wheelchair lborchick (Manual)    strike-out   11/28/2023 11:28 217.7 Lbs Wheelchair [email or web address] (Manual)    strike-out   11/21/2023 14:33 217.0 Lbs Wheelchair ealbert (Manual)    strike-out   11/14/2023 13:26 217.2 Lbs Wheelchair khorner (Manual)    strike-out   11/7/2023 14:59 217.0 Lbs Mechanical Lift bfioranelli (Manual)    strike-out   11/2/2023 13:58 219.5 Lbs Mechanical Lift adowdy (Manual)    strike-out   11/2/2023 08:24 219.0 Lbs Mechanical Lift adowdy (Manual)    strike-out   11/1/2023 21:55 219.0 Lbs Mechanical Lift jagou (Manual)    strike-out   11/1/2023 14:28 219.0 Lbs Mechanical Lift khorner (Manual)        2D echocardiogram 10/22: left ventricular ejection fraction 61%. Grade 1 diastolic dysfunction   Monitor routine weight - stable, no alarming uptrend  Monitor volume status clinically - appears euvolemic  Continue lasix             CAD (coronary artery disease)     No acute cardiac complaints, seems stable  Continue aspirin, plavix statin         Anemia     Baseline Hb appears around 11-12  -monitor on routine labs - seems stable/improving post-op  -monitor for acute bleed - no present signs  -consider further workup, Heme consult if persistent/worsening  -transfuse PRN Hb <7           COVID-19     Recently COVID positive as per prior note  Treated with paxlovid  Isolation as per facility protocol  Supportive care  Symptomatically improving  Continue to monitor         Advance care planning     Verbalizes HCP as wife Eam  Extensive discussion/education with patient today regarding their wishes with respect to resuscitative measures; discussed potential risks vs benefits of resuscitative measures; patient verbalizes understanding, appears to have capacity to make this decision presently, and clearly verbalizes a desire for DNR/DNI, in accordance with prior         Hypokalemia     Mild on recent lab  Likely in  setting of recent GI losses/loose stools (likely in setting of COVID)  Replete with oral potassium  Recheck BMP and consider checking Mg if persistent                Chief Complaint     Follow up 30 day    History of Present Illness     Abner King is a 63 y.o. male who was seen today for follow up. PMH including s/p right AKA (Oct 2023), history of left BKA, type 2 DM, peripheral vascular disease, gastroparesis, CHF, hyperlipidemia, GERD, anxiety, vitamin deficiency, and atherosclerotic heart disease.       Patient seen and examined in room  Others present: none  Patient laying in bed with head elevated, able to reposition independently in bed  Appears comfortable, awake, alert, oriented to situation, able to converse appropriately  Patient polite and in good spirits. Notes no acute complaints. Reports recovered well from recent COVID, feels essentially back to 100 percent normal, breathing fine, no pain anywhere, appetite returning back to normal, no further recent nausea/vomiting. Urinating fine (just urinated into jug at beginning of visit). Last BM 2 days ago and thinks likely another one soon. No acute cardiopulmonary, abdominal, or urinary symptoms; see ROS for more details.    No further questions or acute concerns identified.  No acute concerns purse his nurse today.    Lab Review:   12/28: BMP with K 3.5, CBC with Hb 10.7  1/2: BMP with K 3.4, CBC with Hb 11    The following portions of the patient's history were reviewed and updated as appropriate: allergies, current medications, past family history, past medical history, past social history, past surgical history and problem list.    Review of Systems     Review of Systems   Constitutional:  Positive for appetite change. Negative for chills, diaphoresis and fever.   HENT:  Negative for drooling, ear pain, hearing loss, rhinorrhea, sore throat and trouble swallowing.    Eyes:  Negative for photophobia, pain, discharge, redness, itching and visual  disturbance.   Respiratory:  Negative for cough, chest tightness, shortness of breath and wheezing.    Cardiovascular:  Negative for chest pain and palpitations.   Gastrointestinal:  Positive for constipation. Negative for abdominal pain, blood in stool, diarrhea, nausea and vomiting.   Genitourinary:  Negative for difficulty urinating, dysuria, flank pain and hematuria.   Musculoskeletal:  Positive for gait problem. Negative for arthralgias and back pain.   Skin:  Negative for color change.   Neurological:  Negative for dizziness, tremors, seizures, facial asymmetry, speech difficulty and weakness.   Psychiatric/Behavioral:  Negative for agitation, behavioral problems and confusion. The patient is not nervous/anxious and is not hyperactive.        Active Problem List     Patient Active Problem List   Diagnosis    Anxiety and depression    Type 2 diabetes mellitus with diabetic neuropathy, with long-term current use of insulin ([facility])    Hyperlipidemia    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    Vitamin D deficiency    PAD (peripheral artery disease) ([facility])    S/P BKA (below knee amputation), left ([facility])    Orthostatic hypotension    Diabetic gastroparesis     Class 2 severe obesity due to excess calories with serious comorbidity and body mass index (BMI) of 35.0 to 35.9 in adult     Triple vessel coronary artery disease    Hypertensive heart disease with congestive heart failure ([facility])    Chronic heart failure with preserved ejection fraction ([facility])    S/P CABG x 3    Diabetic autonomic neuropathy associated with type 2 diabetes mellitus ([facility])    Hyponatremia    Obstructive sleep apnea    Phantom limb syndrome without pain ([facility])    CAD (coronary artery disease)    Nodular rash    Elephantiasis [place]    Embolism and thrombosis of arteries of the lower extremities ([facility])    Lymphedema of right lower extremity    Venous stasis dermatitis of right lower extremity     Abdominal distension    Non-pressure chronic ulcer of right calf with fat layer exposed (HCC)    Cellulitis of right ankle    Lymphedema in adult patient    Diabetic ulcer of right heel (HCC)    Loose stools    Elevated troponin    Bacteremia due to Proteus species    Gangrene (HCC)    Anemia    S/P AKA (above knee amputation), right (HCC)    Chills    Bilious vomiting with nausea    COVID-19    Advance care planning    Hypokalemia       Objective     Vital Signs:     BP: 114/65 mmHg  1/3/2024 08:36   Temp:97.7 °F  1/3/2024 08:36 Pulse:68 bpm  1/3/2024 08:36   Weight:217.8 Lbs  12/9/2023 14:36   Resp:18 Breaths/min  1/3/2024 08:36 BS:122 mg/dL  1/3/2024 21:34 O2:96 %  1/3/2024 08:36 Pain:0  1/3/2024 17:27       Physical Exam  Vitals reviewed.   Constitutional:       General: He is not in acute distress.     Appearance: He is not toxic-appearing or diaphoretic.   HENT:      Head: Normocephalic and atraumatic.      Right Ear: External ear normal.      Left Ear: External ear normal.      Nose: No rhinorrhea.      Mouth/Throat:      Mouth: Mucous membranes are moist.      Pharynx: Oropharynx is clear. No oropharyngeal exudate or posterior oropharyngeal erythema.   Eyes:      General: No scleral icterus.        Right eye: No discharge.         Left eye: No discharge.      Extraocular Movements: Extraocular movements intact.      Conjunctiva/sclera: Conjunctivae normal.      Pupils: Pupils are equal, round, and reactive to light.   Cardiovascular:      Rate and Rhythm: Normal rate and regular rhythm.   Pulmonary:      Effort: Pulmonary effort is normal. No respiratory distress.      Breath sounds: No wheezing or rales.   Abdominal:      General: Bowel sounds are normal.      Palpations: Abdomen is soft.      Tenderness: There is no abdominal tenderness. There is no guarding.   Musculoskeletal:         General: No tenderness.      Comments: S/p L BKA and R AKA, sites appear clean/dry/well healed   Skin:     General: Skin  is warm and dry.      Coloration: Skin is not jaundiced.   Neurological:      General: No focal deficit present.      Mental Status: He is alert and oriented to person, place, and time. Mental status is at baseline.   Psychiatric:         Mood and Affect: Mood normal.         Behavior: Behavior normal.         Thought Content: Thought content normal.         Judgment: Judgment normal.         Pertinent Laboratory/Diagnostic Studies:  Laboratory and Imaging studies reviewed. Full report in the paper chart.     Current Medications   Medications reviewed and updated in facility chart.      -Total time spent on this encounter today including documentation and workup review, face to face time, history and exam, and documentation/orders was approximately 30 minutes.  -This note will be copied to [facility] EMR where vitals and medication orders are placed.    Arias Fox D.O.  Geriatric Medicine  1/3/2024 10:44 PM

## 2024-01-04 NOTE — ASSESSMENT & PLAN NOTE
Recently COVID positive as per prior note  Treated with paxlovid  Isolation as per facility protocol  Supportive care  Symptomatically improving  Continue to monitor

## 2024-01-04 NOTE — ASSESSMENT & PLAN NOTE
Wt Readings from Last 3 Encounters:   12/29/23 98.8 kg (217 lb 12.8 oz)   11/30/23 99.2 kg (218 lb 11.2 oz)   11/06/23 99.6 kg (219 lb 8 oz)     strike-out   12/9/2023 14:36 217.8 Lbs Wheelchair mnunez (Manual)     12/21/2023 10:17 by Miguelina miner   strike-out   11/29/2023 14:47 218.7 Lbs Wheelchair lborchick (Manual)    strike-out   11/28/2023 11:28 217.7 Lbs Wheelchair [email or web address] (Manual)    strike-out   11/21/2023 14:33 217.0 Lbs Wheelchair ealbert (Manual)    strike-out   11/14/2023 13:26 217.2 Lbs Wheelchair khorner (Manual)    strike-out   11/7/2023 14:59 217.0 Lbs Mechanical Lift bfioranelli (Manual)    strike-out   11/2/2023 13:58 219.5 Lbs Mechanical Lift adowdy (Manual)    strike-out   11/2/2023 08:24 219.0 Lbs Mechanical Lift adowdy (Manual)    strike-out   11/1/2023 21:55 219.0 Lbs Mechanical Lift jagou (Manual)    strike-out   11/1/2023 14:28 219.0 Lbs Mechanical Lift khorner (Manual)        2D echocardiogram 10/22: left ventricular ejection fraction 61%. Grade 1 diastolic dysfunction   Monitor routine weight - stable, no alarming uptrend  Monitor volume status clinically - appears euvolemic  Continue lasix

## 2024-01-04 NOTE — ASSESSMENT & PLAN NOTE
Mood stable  Stressors include health complications, amputations  Continue alprazolam 0.25 mg po daily at bedtime prn  Continue supportive care while at rehab  Monitor for change in mood/behaviors

## 2024-01-04 NOTE — ASSESSMENT & PLAN NOTE
Mild on recent lab  Likely in setting of recent GI losses/loose stools (likely in setting of COVID)  Replete with oral potassium  Recheck BMP and consider checking Mg if persistent

## 2024-01-04 NOTE — ASSESSMENT & PLAN NOTE
Baseline Hb appears around 11-12  -monitor on routine labs - seems stable/improving post-op  -monitor for acute bleed - no present signs  -consider further workup, Heme consult if persistent/worsening  -transfuse PRN Hb <7

## 2024-01-04 NOTE — ASSESSMENT & PLAN NOTE
Lab Results   Component Value Date    HGBA1C 6.7 (A) 02/02/2023     Seems stable  Recommend avoidance of imodium due to constipation risk in his case  Consider banana flakes instead if needed  Monitor stool output - recently seems stable  Bowel regimen at facility: continue bowel regimen protocol as ordered; bisacodyl suppository PRN if no BM in 2-3 days  Encourage mobility as tolerated, PO hydration as appropriate, high fiber diet/prune juice (in outpatient setting as appropriate)  Goal is for 1 easy BM every 1-2 days

## 2024-01-24 ENCOUNTER — NURSING HOME VISIT (OUTPATIENT)
Dept: GERIATRICS | Facility: OTHER | Age: 64
End: 2024-01-24
Payer: COMMERCIAL

## 2024-01-24 DIAGNOSIS — E11.43 DIABETIC GASTROPARESIS: Primary | Chronic | ICD-10-CM

## 2024-01-24 DIAGNOSIS — E87.6 HYPOKALEMIA: ICD-10-CM

## 2024-01-24 DIAGNOSIS — R19.5 LOOSE STOOLS: ICD-10-CM

## 2024-01-24 DIAGNOSIS — K31.84 DIABETIC GASTROPARESIS: Primary | Chronic | ICD-10-CM

## 2024-01-24 PROCEDURE — 99308 SBSQ NF CARE LOW MDM 20: CPT

## 2024-01-25 VITALS
RESPIRATION RATE: 18 BRPM | WEIGHT: 204 LBS | DIASTOLIC BLOOD PRESSURE: 53 MMHG | BODY MASS INDEX: 26.91 KG/M2 | SYSTOLIC BLOOD PRESSURE: 112 MMHG | HEART RATE: 67 BPM | OXYGEN SATURATION: 97 % | TEMPERATURE: 97 F

## 2024-01-26 NOTE — ASSESSMENT & PLAN NOTE
Recent loose stools   Possibly secondary to gastroparesis   Trial of banatrol   Resident not feeling banana flakes have been effective  Would like to try pepto-bismol which has been effective in the past  Denies N/V  Continue to monitor  May require testing to rule out infectious disease in GI tract

## 2024-01-26 NOTE — ASSESSMENT & PLAN NOTE
Recent loose stools  Resident feels this has been happening since having covid  He would like to trial pepto-bismol for recent loose stools  Has tried banana flakes with no change in bowel movements  Continue to monitor

## 2024-01-26 NOTE — PROGRESS NOTES
[address], [address], [place] [address]  [phone]    NAME: Abner King  AGE: 63 y.o. SEX: male    Progress Note    Location: [facility]  POS: 32 ([facility])  Code Status: DNR    Chief complaint / Reason for visit:  Acute Visit    Assessment/Plan:    Loose stools  Recent loose stools   Possibly secondary to gastroparesis   Trial of banatrol   Resident not feeling banana flakes have been effective  Would like to try pepto-bismol which has been effective in the past  Denies N/V  Continue to monitor  May require testing to rule out infectious disease in GI tract    Diabetic gastroparesis   Recent loose stools  Resident feels this has been happening since having covid  He would like to trial pepto-bismol for recent loose stools  Has tried banana flakes with no change in bowel movements  Continue to monitor     Hypokalemia  Recent loose stools  Recheck BMP and Mag  Replete electrolytes as needed      This is a 63 y.o. male seen today at [facility].  Medical history includes, not limited to, s/p right AKA, history of left BKA, type 2 DM, peripheral vascular disease, gastroparesis, CHF, hyperlipidemia, GERD, anxiety, vitamin deficiency, and atherosclerotic heart disease.      Resident is seen today for an acute visit. Nursing staff reporting resident would like to discuss his recent loose stools and a change in his medications.  He is alert and oriented x 3, able to answer all questions appropriately.  Upon entering his room he is resting comfortably in bed.  He states that he tried the banana flakes which initially were working but he still has loose stools.  He states in the past he used pepto-bismol with success and would like to try again.  He denies a change in his appetite.  No fever or chills.  Denies N/V.       Reviewed resident with nursing staff. New orders placed. Vitals stable.  Bowel regimen orders reviewed.          Nursing and prior provider notes reviewed  on this visit. Discussed visit with PCP and nursing staff/ supervisor.      Review of Systems   Constitutional:  Positive for activity change and appetite change. Negative for fever.   HENT:  Negative for congestion and rhinorrhea.    Eyes:  Negative for pain and visual disturbance.   Respiratory:  Negative for cough and shortness of breath.    Cardiovascular:  Negative for chest pain and leg swelling.   Gastrointestinal:  Positive for diarrhea. Negative for abdominal pain and constipation.   Genitourinary:  Negative for difficulty urinating and dysuria.   Musculoskeletal:  Positive for gait problem (bilateral BKA). Negative for arthralgias.   Skin:  Negative for color change and rash.   Neurological:  Negative for dizziness, syncope and weakness.   Psychiatric/Behavioral:  Negative for behavioral problems and confusion.    All other systems reviewed and are negative.         ALLERGY: Reviewed, unchanged  No Known Allergies    HISTORY:  Medical Hx: Reviewed, unchanged  Family Hx: Reviewed, unchanged  Soc Hx: Reviewed,  unchanged      Physical Exam  Vitals reviewed.   Constitutional:       General: He is not in acute distress.     Appearance: Normal appearance. He is not ill-appearing.   HENT:      Head: Normocephalic.      Right Ear: Tympanic membrane normal.      Left Ear: Tympanic membrane normal.      Nose: Nose normal. No congestion.      Mouth/Throat:      Mouth: Mucous membranes are moist.      Pharynx: Oropharynx is clear.   Eyes:      General:         Right eye: No discharge.         Left eye: No discharge.      Extraocular Movements: Extraocular movements intact.      Conjunctiva/sclera: Conjunctivae normal.      Pupils: Pupils are equal, round, and reactive to light.   Cardiovascular:      Rate and Rhythm: Normal rate and regular rhythm.      Pulses: Normal pulses.      Heart sounds: Normal heart sounds.   Pulmonary:      Effort: Pulmonary effort is normal. No respiratory distress.      Breath sounds:  "Normal breath sounds.   Chest:      Chest wall: No tenderness.   Abdominal:      General: Bowel sounds are normal.      Palpations: Abdomen is soft.      Tenderness: There is no abdominal tenderness.   Musculoskeletal:         General: Deformity present. No swelling. Normal range of motion.      Cervical back: Normal range of motion.      Right lower leg: No edema.      Left lower leg: No edema.   Skin:     General: Skin is warm and dry.   Neurological:      Mental Status: He is alert and oriented to person, place, and time. Mental status is at baseline.      Motor: No weakness.   Psychiatric:         Mood and Affect: Mood normal.         Behavior: Behavior normal.         Thought Content: Thought content normal.         Judgment: Judgment normal.            Laboratory results / Imaging reviewed: Hard copy/ies in medical chart:    Vitals:    01/25/24 2201   BP: 112/53   Pulse: 67   Resp: 18   Temp: (!) 97 °F (36.1 °C)   SpO2: 97%       Current Medications:  All medications reviewed and updated in Nursing Home Chart    Please note: This note was completed in part utilizing a voice-recognition software may have been used in the preparation of this document. Grammatical errors, random word insertion, spelling mistakes, and incomplete sentences may be an occasional consequence of the system secondary to software limitations, ambient noise and hardware issues. Occasional wrong word or \"sound-alike\" substitutions may have occurred due to the inherent limitations of voice recognition software. At the time of dictation, efforts were made to edit, clarify and/or correct errors. Interpretation should be guided by context. Please read the chart carefully and recognize, using context, where substitutions have occurred. If you have any questions or concerns about the context, text or information contained within the body of this dictation, please contact myself, the provider, for further clarification.      Christine Guy, " CARLTON  1/25/2024

## 2024-01-31 ENCOUNTER — NURSING HOME VISIT (OUTPATIENT)
Dept: GERIATRICS | Facility: OTHER | Age: 64
End: 2024-01-31
Payer: COMMERCIAL

## 2024-01-31 DIAGNOSIS — Z79.4 TYPE 2 DIABETES MELLITUS WITH DIABETIC NEUROPATHY, WITH LONG-TERM CURRENT USE OF INSULIN (HCC): ICD-10-CM

## 2024-01-31 DIAGNOSIS — R19.5 LOOSE STOOLS: ICD-10-CM

## 2024-01-31 DIAGNOSIS — E11.40 TYPE 2 DIABETES MELLITUS WITH DIABETIC NEUROPATHY, WITH LONG-TERM CURRENT USE OF INSULIN (HCC): ICD-10-CM

## 2024-01-31 DIAGNOSIS — Z89.611 S/P AKA (ABOVE KNEE AMPUTATION), RIGHT (HCC): ICD-10-CM

## 2024-01-31 DIAGNOSIS — E55.9 VITAMIN D DEFICIENCY: ICD-10-CM

## 2024-01-31 DIAGNOSIS — Z89.512 S/P BKA (BELOW KNEE AMPUTATION), LEFT (HCC): ICD-10-CM

## 2024-01-31 DIAGNOSIS — K31.84 DIABETIC GASTROPARESIS: Primary | Chronic | ICD-10-CM

## 2024-01-31 DIAGNOSIS — E11.43 DIABETIC GASTROPARESIS: Primary | Chronic | ICD-10-CM

## 2024-01-31 PROCEDURE — 99309 SBSQ NF CARE MODERATE MDM 30: CPT

## 2024-02-05 ENCOUNTER — NURSING HOME VISIT (OUTPATIENT)
Dept: GERIATRICS | Facility: OTHER | Age: 64
End: 2024-02-05
Payer: COMMERCIAL

## 2024-02-05 VITALS
OXYGEN SATURATION: 97 % | WEIGHT: 203 LBS | HEART RATE: 67 BPM | BODY MASS INDEX: 26.78 KG/M2 | TEMPERATURE: 97 F | SYSTOLIC BLOOD PRESSURE: 112 MMHG | DIASTOLIC BLOOD PRESSURE: 53 MMHG | RESPIRATION RATE: 18 BRPM

## 2024-02-05 DIAGNOSIS — E87.1 HYPONATREMIA: ICD-10-CM

## 2024-02-05 DIAGNOSIS — E87.6 HYPOKALEMIA: ICD-10-CM

## 2024-02-05 DIAGNOSIS — R19.5 LOOSE STOOLS: Primary | ICD-10-CM

## 2024-02-05 PROCEDURE — 99308 SBSQ NF CARE LOW MDM 20: CPT

## 2024-02-05 NOTE — ASSESSMENT & PLAN NOTE
Recent loose stools   Trial of banatrol   Resident not feeling banana flakes had been effective  Was discontinued  Wanted try pepto-bismol which has been effective in the past  Continues with loose stools  Denies N/V  States he is feeling well otherwise  Stool sample to be collected and sent for bacteria  BMP to monitor electrolytes  Discontinuation of pantoprazole

## 2024-02-05 NOTE — PROGRESS NOTES
[facility]  [address] [place] [address], [address], [place] [address]  [phone]    NAME: Abner King  AGE: 63 y.o. SEX: male    Progress Note    Location: [facility]  POS: 32 ([facility])  Code Status: DNR    Chief complaint / Reason for visit:  Acute Visit    Assessment/Plan:    Loose stools  Recent loose stools   Trial of banatrol   Resident not feeling banana flakes had been effective  Was discontinued  Wanted try pepto-bismol which has been effective in the past  Continues with loose stools  Denies N/V  States he is feeling well otherwise  Stool sample to be collected and sent for bacteria  BMP to monitor electrolytes  Discontinuation of pantoprazole    Hyponatremia  Repeat BMP for 02/06 to monitor electrolytes    Hypokalemia  Recent loose stools  Recheck BMP  Replete electrolytes as needed    This is a 63 y.o. male seen today at [facility].  Medical history includes, not limited to, s/p right AKA, history of left BKA, type 2 DM, peripheral vascular disease, gastroparesis, CHF, hyperlipidemia, GERD, anxiety, vitamin deficiency, and atherosclerotic heart disease.      Resident is seen today for a follow up on an acute visit. He is alert and oriented x 3, able to answer all questions appropriately.  Upon entering his room he is resting comfortably in bed.  He states he is still with loose stools which is concerning because it has been going on for some time.  He states that because of his loose stools it has been hard for him to get out of bed because of having to use the bedpan.  He has trialed pepto-bismol and banana flakes with no success.  Discussed with him of obtaining blood work and a stool sample to test for bacteria.  He is agreeable.  Also if his stool sample comes back negative for bacteria then could start him on a probiotic. He denies a change in his appetite.  States he has cut out dairy from his diet. No fever or chills.  Denies N/V.    He is agreeable with the plan of care.        Reviewed resident with nursing staff. New orders placed.          Nursing and prior provider notes reviewed on this visit. Discussed visit with PCP and nursing staff/ supervisor.      Review of Systems   Constitutional:  Positive for activity change. Negative for fever.   HENT:  Negative for congestion and rhinorrhea.    Eyes:  Negative for pain and visual disturbance.   Respiratory:  Negative for cough and shortness of breath.    Cardiovascular:  Negative for chest pain and leg swelling.   Gastrointestinal:  Positive for diarrhea. Negative for abdominal pain and constipation.   Genitourinary:  Negative for difficulty urinating and dysuria.   Musculoskeletal:  Positive for gait problem (bilateral BKA). Negative for arthralgias.   Skin:  Negative for color change and rash.   Neurological:  Negative for dizziness, syncope and weakness.   Psychiatric/Behavioral:  Negative for behavioral problems and confusion.    All other systems reviewed and are negative.         ALLERGY: Reviewed, unchanged  No Known Allergies    HISTORY:  Medical Hx: Reviewed, unchanged  Family Hx: Reviewed, unchanged  Soc Hx: Reviewed,  unchanged      Physical Exam  Vitals reviewed.   Constitutional:       General: He is not in acute distress.     Appearance: Normal appearance. He is not ill-appearing.   HENT:      Head: Normocephalic.      Right Ear: Tympanic membrane normal.      Left Ear: Tympanic membrane normal.      Nose: Nose normal. No congestion.      Mouth/Throat:      Mouth: Mucous membranes are moist.      Pharynx: Oropharynx is clear.   Eyes:      General:         Right eye: No discharge.         Left eye: No discharge.      Extraocular Movements: Extraocular movements intact.      Conjunctiva/sclera: Conjunctivae normal.      Pupils: Pupils are equal, round, and reactive to light.   Cardiovascular:      Rate and Rhythm: Normal rate and regular rhythm.      Pulses: Normal pulses.      Heart sounds: Normal heart sounds.  "  Pulmonary:      Effort: Pulmonary effort is normal. No respiratory distress.      Breath sounds: Normal breath sounds.   Chest:      Chest wall: No tenderness.   Abdominal:      General: Bowel sounds are normal.      Palpations: Abdomen is soft.      Tenderness: There is no abdominal tenderness.   Musculoskeletal:         General: Deformity present. No swelling. Normal range of motion.      Cervical back: Normal range of motion.      Right lower leg: No edema.      Left lower leg: No edema.   Skin:     General: Skin is warm and dry.   Neurological:      Mental Status: He is alert and oriented to person, place, and time. Mental status is at baseline.      Motor: No weakness.   Psychiatric:         Mood and Affect: Mood normal.         Behavior: Behavior normal.         Thought Content: Thought content normal.         Judgment: Judgment normal.            Laboratory results / Imaging reviewed: Hard copy/ies in medical chart:    Vitals:    02/05/24 1715   BP: 112/53   Pulse: 67   Resp: 18   Temp: (!) 97 °F (36.1 °C)   SpO2: 97%       Current Medications:  All medications reviewed and updated in Nursing Home Chart    Please note: This note was completed in part utilizing a voice-recognition software may have been used in the preparation of this document. Grammatical errors, random word insertion, spelling mistakes, and incomplete sentences may be an occasional consequence of the system secondary to software limitations, ambient noise and hardware issues. Occasional wrong word or \"sound-alike\" substitutions may have occurred due to the inherent limitations of voice recognition software. At the time of dictation, efforts were made to edit, clarify and/or correct errors. Interpretation should be guided by context. Please read the chart carefully and recognize, using context, where substitutions have occurred. If you have any questions or concerns about the context, text or information contained within the body of this " dictation, please contact myself, the provider, for further clarification.      CARLTON Givens  2/5/2024

## 2024-02-06 VITALS
OXYGEN SATURATION: 97 % | RESPIRATION RATE: 18 BRPM | BODY MASS INDEX: 26.78 KG/M2 | TEMPERATURE: 97 F | HEART RATE: 67 BPM | WEIGHT: 203 LBS | DIASTOLIC BLOOD PRESSURE: 53 MMHG | SYSTOLIC BLOOD PRESSURE: 112 MMHG

## 2024-02-06 RX ORDER — SACCHAROMYCES BOULARDII 250 MG
250 CAPSULE ORAL DAILY
COMMUNITY

## 2024-02-07 NOTE — ASSESSMENT & PLAN NOTE
Recent loose stools   Believes to be related from recent covid   Trial of banatrol   Would like to try pepto-bismol which has been effective in the past  Denies N/V  States he is feeling well otherwise

## 2024-02-07 NOTE — PROGRESS NOTES
[facility]  [address] [place] [address], [address], [place] [address]  [phone]    NAME: Abner King  AGE: 63 y.o. SEX: male    Progress Note    Location: [facility]   POS: 32 ([facility])  Code Status: DNR    Chief complaint / Reason for visit:  Follow-up visit    Assessment/Plan:    Diabetic gastroparesis   Recent loose stools  Resident feels this has been happening since having covid  Trial of banana flakes/pepto-bismol  May require stool study  Continue to monitor   If no improvement may need GI consult    Type 2 diabetes mellitus with diabetic neuropathy, with long-term current use of insulin ([facility])    Lab Results   Component Value Date    HGBA1C 6.7 (A) 02/02/2023     Continue insulin lantus 40 units SQ BID  Blood sugars reviewed from facility records  Acceptable  Continue to monitor Q6 months  Follow up HgA1c ordered for 02/01  Encourage CCD    Vitamin D deficiency  Continue vitamin D supplementation 1,000 units po daily    S/P BKA (below knee amputation), left ([facility])  Currently uses prosthetic   Will continue PT/OT when prosthetic for right leg is fitted by [facility]  Continue to monitor skin for any breakdown     S/P AKA (above knee amputation), right ([facility])  Cleared by vascular surgery for weight bearing status  Started limb  to right AKA  Follow up with [facility] clinic to be fitted for prosthetic  Monitor site for any breakdown in skin    Loose stools  Recent loose stools   Believes to be related from recent covid   Trial of banatrol   Would like to try pepto-bismol which has been effective in the past  Denies N/V  States he is feeling well otherwise      This is a 63 y.o. male seen today at [facility].  Medical history includes, not limited to, s/p right AKA, history of left BKA, type 2 DM, peripheral vascular disease, gastroparesis, CHF, hyperlipidemia, GERD, anxiety, vitamin deficiency, and atherosclerotic heart disease.      Resident is seen today for a routine follow up visit.  He is alert and oriented x 3, able to answer all questions appropriately.  Upon entering his room he is resting comfortably in bed.  He states that he continues to have loose stools.  Some days it is better than others.  He has been trying to eliminate dairy from his diet and inquired of receiving lactaid milk. He denies a change in his appetite.  No fever or chills.  Denies N/V.  States he has been compliant with his stump .         Reviewed resident with nursing staff. New orders placed. Vitals stable.  Bowel regimen orders reviewed.          Nursing and prior provider notes reviewed on this visit. Discussed visit with PCP and nursing staff/ supervisor.      Review of Systems   Constitutional:  Positive for activity change and appetite change. Negative for fever.   HENT:  Negative for congestion and rhinorrhea.    Eyes:  Negative for pain and visual disturbance.   Respiratory:  Negative for cough and shortness of breath.    Cardiovascular:  Negative for chest pain and leg swelling.   Gastrointestinal:  Positive for diarrhea. Negative for abdominal pain and constipation.   Genitourinary:  Negative for difficulty urinating and dysuria.   Musculoskeletal:  Positive for gait problem (bilateral BKA). Negative for arthralgias.   Skin:  Negative for color change and rash.   Neurological:  Negative for dizziness, syncope and weakness.   Psychiatric/Behavioral:  Negative for behavioral problems and confusion.    All other systems reviewed and are negative.         ALLERGY: Reviewed, unchanged  No Known Allergies    HISTORY:  Medical Hx: Reviewed, unchanged  Family Hx: Reviewed, unchanged  Soc Hx: Reviewed,  unchanged      Physical Exam  Vitals reviewed.   Constitutional:       General: He is not in acute distress.     Appearance: Normal appearance. He is not ill-appearing.   HENT:      Head: Normocephalic.      Right Ear: Tympanic membrane normal.      Left Ear: Tympanic membrane normal.      Nose: Nose normal. No  congestion.      Mouth/Throat:      Mouth: Mucous membranes are moist.      Pharynx: Oropharynx is clear.   Eyes:      General:         Right eye: No discharge.         Left eye: No discharge.      Extraocular Movements: Extraocular movements intact.      Conjunctiva/sclera: Conjunctivae normal.      Pupils: Pupils are equal, round, and reactive to light.   Cardiovascular:      Rate and Rhythm: Normal rate and regular rhythm.      Pulses: Normal pulses.      Heart sounds: Normal heart sounds.   Pulmonary:      Effort: Pulmonary effort is normal. No respiratory distress.      Breath sounds: Normal breath sounds.   Chest:      Chest wall: No tenderness.   Abdominal:      General: Bowel sounds are normal.      Palpations: Abdomen is soft.      Tenderness: There is no abdominal tenderness.   Musculoskeletal:         General: Deformity present. No swelling. Normal range of motion.      Cervical back: Normal range of motion.      Right lower leg: No edema.      Left lower leg: No edema.   Skin:     General: Skin is warm and dry.   Neurological:      Mental Status: He is alert and oriented to person, place, and time. Mental status is at baseline.      Motor: No weakness.   Psychiatric:         Mood and Affect: Mood normal.         Behavior: Behavior normal.         Thought Content: Thought content normal.         Judgment: Judgment normal.            Laboratory results / Imaging reviewed: Hard copy/ies in medical chart:    Vitals:    01/31/24 2130   BP: 112/53   Pulse: 67   Resp: 18   Temp: (!) 97 °F (36.1 °C)   SpO2: 97%       Current Medications:  All medications reviewed and updated in Nursing Home Chart    Please note: This note was completed in part utilizing a voice-recognition software may have been used in the preparation of this document. Grammatical errors, random word insertion, spelling mistakes, and incomplete sentences may be an occasional consequence of the system secondary to software limitations, ambient  "noise and hardware issues. Occasional wrong word or \"sound-alike\" substitutions may have occurred due to the inherent limitations of voice recognition software. At the time of dictation, efforts were made to edit, clarify and/or correct errors. Interpretation should be guided by context. Please read the chart carefully and recognize, using context, where substitutions have occurred. If you have any questions or concerns about the context, text or information contained within the body of this dictation, please contact myself, the provider, for further clarification.      CARLTON Givens  2/6/2024  "

## 2024-02-07 NOTE — ASSESSMENT & PLAN NOTE
Currently uses prosthetic   Will continue PT/OT when prosthetic for right leg is fitted by [facility]  Continue to monitor skin for any breakdown

## 2024-02-07 NOTE — ASSESSMENT & PLAN NOTE
Recent loose stools  Resident feels this has been happening since having covid  Trial of banana flakes/pepto-bismol  May require stool study  Continue to monitor   If no improvement may need GI consult

## 2024-02-07 NOTE — ASSESSMENT & PLAN NOTE
Lab Results   Component Value Date    HGBA1C 6.7 (A) 02/02/2023     Continue insulin lantus 40 units SQ BID  Blood sugars reviewed from facility records  Acceptable  Continue to monitor Q6 months  Follow up HgA1c ordered for 02/01  Encourage CCD

## 2024-02-15 ENCOUNTER — TELEPHONE (OUTPATIENT)
Dept: NEUROLOGY | Facility: CLINIC | Age: 64
End: 2024-02-15

## 2024-02-15 NOTE — TELEPHONE ENCOUNTER
Liliana from [facility] called in to schedule patient with Physiatry.    Patient was schedule on 9/11/24 at 1:30pm with Dr Overton in [place].    Patient was placed on wait list.

## 2024-02-27 ENCOUNTER — NURSING HOME VISIT (OUTPATIENT)
Dept: GERIATRICS | Facility: OTHER | Age: 64
End: 2024-02-27
Payer: COMMERCIAL

## 2024-02-27 VITALS
TEMPERATURE: 97 F | BODY MASS INDEX: 27.11 KG/M2 | WEIGHT: 205.5 LBS | RESPIRATION RATE: 18 BRPM | DIASTOLIC BLOOD PRESSURE: 53 MMHG | HEART RATE: 67 BPM | OXYGEN SATURATION: 97 % | SYSTOLIC BLOOD PRESSURE: 112 MMHG

## 2024-02-27 DIAGNOSIS — K21.9 GASTROESOPHAGEAL REFLUX DISEASE WITHOUT ESOPHAGITIS: Primary | ICD-10-CM

## 2024-02-27 DIAGNOSIS — L98.9 SKIN ABNORMALITY: ICD-10-CM

## 2024-02-27 PROCEDURE — 99308 SBSQ NF CARE LOW MDM 20: CPT

## 2024-02-27 RX ORDER — PANTOPRAZOLE SODIUM 20 MG/1
20 TABLET, DELAYED RELEASE ORAL DAILY
COMMUNITY

## 2024-02-28 NOTE — PROGRESS NOTES
[address], [address], [place] [address]  [phone]    NAME: Abner King  AGE: 63 y.o. SEX: male    Progress Note    Location: [facility]  POS: 32 ([facility])  Code Status: DNR    Chief complaint / Reason for visit:  Acute Visit    Assessment/Plan:    Gastroesophageal reflux disease without esophagitis  Trial of discontinuation off protonix unsuccessful   Resident requesting to be added again d/t feeling acid reflux  Was originally discontinued as a thought of contributing to his loose bowels  Will restart protonix 20 mg po daily    Skin abnormality  Disruption of skin  A scrape turned into a bleeding spot on right arm  Drainage noted on dressing  Removed, skin assessed, cleaned with normal saline, new dressing applied      This is a 63 y.o. male seen today at [facility].  Medical history includes, not limited to, s/p right AKA, history of left BKA, type 2 DM, peripheral vascular disease, gastroparesis, CHF, hyperlipidemia, GERD, anxiety, vitamin deficiency, and atherosclerotic heart disease.      Resident is seen today for an acute visit.  Nursing staff reporting a wound to his right arm occurring overnight.  Upon entering the room he has a bandage with obvious drainage on his right arm.  He states he must of scratched his arm open when using a washcloth.  He states it has happened in the past and he bleeds a lot.  Dressing removed and skin assessed.      During our conversation he also states he has started to notice some acid reflux since discontinuing his protonix.  He would like to have the medication placed back on to his regimen.         Reviewed resident with nursing staff. New orders placed.          Nursing and prior provider notes reviewed on this visit. Discussed visit with PCP and nursing staff/ supervisor.      Review of Systems   Constitutional:  Positive for activity change and appetite change. Negative for fever.   HENT:  Negative for congestion  and rhinorrhea.    Eyes:  Negative for pain and visual disturbance.   Respiratory:  Negative for cough and shortness of breath.    Cardiovascular:  Negative for chest pain and leg swelling.   Gastrointestinal:  Positive for diarrhea. Negative for abdominal pain, constipation, nausea and vomiting.   Genitourinary:  Negative for difficulty urinating and dysuria.   Musculoskeletal:  Positive for gait problem (bilateral BKA). Negative for arthralgias.   Skin:  Positive for wound. Negative for color change and rash.   Neurological:  Negative for dizziness, syncope and weakness.   Psychiatric/Behavioral:  Negative for behavioral problems and confusion.    All other systems reviewed and are negative.         ALLERGY: Reviewed, unchanged  No Known Allergies    HISTORY:  Medical Hx: Reviewed, unchanged  Family Hx: Reviewed, unchanged  Soc Hx: Reviewed,  unchanged      Physical Exam  Vitals reviewed.   Constitutional:       General: He is not in acute distress.     Appearance: Normal appearance. He is not ill-appearing.   HENT:      Head: Normocephalic.      Right Ear: Tympanic membrane normal.      Left Ear: Tympanic membrane normal.      Nose: Nose normal. No congestion.      Mouth/Throat:      Mouth: Mucous membranes are moist.      Pharynx: Oropharynx is clear.   Eyes:      General:         Right eye: No discharge.         Left eye: No discharge.      Extraocular Movements: Extraocular movements intact.      Conjunctiva/sclera: Conjunctivae normal.      Pupils: Pupils are equal, round, and reactive to light.   Cardiovascular:      Rate and Rhythm: Normal rate and regular rhythm.      Pulses: Normal pulses.      Heart sounds: Normal heart sounds.   Pulmonary:      Effort: Pulmonary effort is normal. No respiratory distress.      Breath sounds: Normal breath sounds.   Chest:      Chest wall: No tenderness.   Abdominal:      General: Bowel sounds are normal.      Palpations: Abdomen is soft.      Tenderness: There is no  "abdominal tenderness.   Musculoskeletal:         General: Deformity present. No swelling. Normal range of motion.      Cervical back: Normal range of motion.      Right lower leg: No edema.      Left lower leg: No edema.   Skin:     General: Skin is warm and dry.   Neurological:      Mental Status: He is alert and oriented to person, place, and time. Mental status is at baseline.      Motor: No weakness.   Psychiatric:         Mood and Affect: Mood normal.         Behavior: Behavior normal.         Thought Content: Thought content normal.         Judgment: Judgment normal.            Laboratory results / Imaging reviewed: Hard copy/ies in medical chart:    Vitals:    02/27/24 1914   BP: 112/53   Pulse: 67   Resp: 18   Temp: (!) 97 °F (36.1 °C)   SpO2: 97%       Current Medications:  All medications reviewed and updated in Nursing Home Chart    Please note: This note was completed in part utilizing a voice-recognition software may have been used in the preparation of this document. Grammatical errors, random word insertion, spelling mistakes, and incomplete sentences may be an occasional consequence of the system secondary to software limitations, ambient noise and hardware issues. Occasional wrong word or \"sound-alike\" substitutions may have occurred due to the inherent limitations of voice recognition software. At the time of dictation, efforts were made to edit, clarify and/or correct errors. Interpretation should be guided by context. Please read the chart carefully and recognize, using context, where substitutions have occurred. If you have any questions or concerns about the context, text or information contained within the body of this dictation, please contact myself, the provider, for further clarification.      CARLTON Givens  2/27/2024  "

## 2024-02-28 NOTE — ASSESSMENT & PLAN NOTE
Trial of discontinuation off protonix unsuccessful   Resident requesting to be added again d/t feeling acid reflux  Was originally discontinued as a thought of contributing to his loose bowels  Will restart protonix 20 mg po daily

## 2024-02-28 NOTE — ASSESSMENT & PLAN NOTE
Disruption of skin  A scrape turned into a bleeding spot on right arm  Drainage noted on dressing  Removed, skin assessed, cleaned with normal saline, new dressing applied

## 2024-03-06 ENCOUNTER — NURSING HOME VISIT (OUTPATIENT)
Dept: GERIATRICS | Facility: OTHER | Age: 64
End: 2024-03-06
Payer: COMMERCIAL

## 2024-03-06 DIAGNOSIS — Z89.611 S/P AKA (ABOVE KNEE AMPUTATION), RIGHT (HCC): ICD-10-CM

## 2024-03-06 DIAGNOSIS — F32.A ANXIETY AND DEPRESSION: ICD-10-CM

## 2024-03-06 DIAGNOSIS — E11.40 TYPE 2 DIABETES MELLITUS WITH DIABETIC NEUROPATHY, WITH LONG-TERM CURRENT USE OF INSULIN (HCC): ICD-10-CM

## 2024-03-06 DIAGNOSIS — I25.10 CORONARY ARTERY DISEASE INVOLVING NATIVE CORONARY ARTERY OF NATIVE HEART WITHOUT ANGINA PECTORIS: ICD-10-CM

## 2024-03-06 DIAGNOSIS — I50.32 CHRONIC HEART FAILURE WITH PRESERVED EJECTION FRACTION (HCC): ICD-10-CM

## 2024-03-06 DIAGNOSIS — Z71.89 ADVANCE CARE PLANNING: ICD-10-CM

## 2024-03-06 DIAGNOSIS — E78.2 MIXED HYPERLIPIDEMIA: ICD-10-CM

## 2024-03-06 DIAGNOSIS — Z79.4 TYPE 2 DIABETES MELLITUS WITH DIABETIC NEUROPATHY, WITH LONG-TERM CURRENT USE OF INSULIN (HCC): ICD-10-CM

## 2024-03-06 DIAGNOSIS — E11.43 DIABETIC GASTROPARESIS: Chronic | ICD-10-CM

## 2024-03-06 DIAGNOSIS — R19.5 LOOSE STOOLS: Primary | ICD-10-CM

## 2024-03-06 DIAGNOSIS — Z86.16 HISTORY OF COVID-19: ICD-10-CM

## 2024-03-06 DIAGNOSIS — K31.84 DIABETIC GASTROPARESIS: Chronic | ICD-10-CM

## 2024-03-06 DIAGNOSIS — I73.9 PAD (PERIPHERAL ARTERY DISEASE) (HCC): ICD-10-CM

## 2024-03-06 DIAGNOSIS — K21.9 GASTROESOPHAGEAL REFLUX DISEASE WITHOUT ESOPHAGITIS: ICD-10-CM

## 2024-03-06 DIAGNOSIS — F41.9 ANXIETY AND DEPRESSION: ICD-10-CM

## 2024-03-06 DIAGNOSIS — Z89.512 S/P BKA (BELOW KNEE AMPUTATION), LEFT (HCC): ICD-10-CM

## 2024-03-06 PROBLEM — B96.4 BACTEREMIA DUE TO PROTEUS SPECIES: Status: RESOLVED | Noted: 2023-10-20 | Resolved: 2024-03-06

## 2024-03-06 PROBLEM — R78.81 BACTEREMIA DUE TO PROTEUS SPECIES: Status: RESOLVED | Noted: 2023-10-20 | Resolved: 2024-03-06

## 2024-03-06 PROCEDURE — 99309 SBSQ NF CARE MODERATE MDM 30: CPT | Performed by: STUDENT IN AN ORGANIZED HEALTH CARE EDUCATION/TRAINING PROGRAM

## 2024-03-06 NOTE — ASSESSMENT & PLAN NOTE
Mood stable  Stressors include health complications, amputations  Continue alprazolam 0.25 mg po daily at bedtime prn  Continue supportive care  Monitor for change in mood/behaviors

## 2024-03-06 NOTE — ASSESSMENT & PLAN NOTE
-stable  -continue PPI (did not tolerate recent attempt at discontinuing pantoprazole due to recurrence of symptoms)  -recommend OOB with meals, sit upright for at least 30 minutes afterwards, avoid trigger foods  -continue to monitor  -follow up with PCP, GI as appropriate

## 2024-03-06 NOTE — ASSESSMENT & PLAN NOTE
Patient had COVID in Dec 2023 with major symptoms including nausea, poor appetite, diarrhea; denied any notable associated respiratory symptoms  Was treated with paxlovid and supportive care  Feels he recovered fully from COVID except that he feels the diarrhea has been an ongoing concern since then, see plan under Diarrhea

## 2024-03-06 NOTE — ASSESSMENT & PLAN NOTE
Lab Results   Component Value Date    HGBA1C 6.7 (A) 02/02/2023       Hx of diabetic gastroparesis with frequent constipation in the past however recently diarrhea has been his main concern  See plan under Diarrhea

## 2024-03-06 NOTE — ASSESSMENT & PLAN NOTE
Cleared by vascular surgery for weight bearing status  continues limb  to right AKA  Follow up with  clinic to be fitted for prosthetic  Monitor site for any breakdown in skin

## 2024-03-06 NOTE — ASSESSMENT & PLAN NOTE
Extensively discussed at a prior visit on 1/3/24  Briefly revisited today to verify, patient onfirms HCP as wife Ema and code status as DNR/DNI as prior

## 2024-03-06 NOTE — PROGRESS NOTES
[facility]  Facility: [facility]    PROGRESS NOTE  Nursing Home Place of Service: nursing home place of service: POS 32 Unskilled- No Part A Coverage    NAME: Abner King  : 1960 AGE: 63 y.o. SEX: male MRN: [id number]  DATE OF ENCOUNTER: 3/6/2024    Assessment and Plan     Problem List Items Addressed This Visit       Anxiety and depression     Mood stable  Stressors include health complications, amputations  Continue alprazolam 0.25 mg po daily at bedtime prn  Continue supportive care  Monitor for change in mood/behaviors          Type 2 diabetes mellitus with diabetic neuropathy, with long-term current use of insulin ([facility])       Lab Results   Component Value Date    HGBA1C 6.7 (A) 2023     A1c 6.4 as of 24  Monitor glucose - generally well controlled in low-mid 100s  Avoid hypoglycemia  Continue lantus 40u BID with hold parameters, consider weaning slightly if sugars remain well controlled or become lower  Encourage CCD  Continue to monitor, check A1c periodically         Hyperlipidemia     Continue statin  Monitor lipids periodically - appear well controlled         PAD (peripheral artery disease) ([facility])     S/p right AKA on 10/25/23  Continue aspirin, statin, plavix         S/P BKA (below knee amputation), left ([facility])     Uses prosthetic  Monitor for skin breakdown         Diabetic gastroparesis  (Chronic)       Lab Results   Component Value Date    HGBA1C 6.7 (A) 2023       Hx of diabetic gastroparesis with frequent constipation in the past however recently diarrhea has been his main concern  See plan under Diarrhea         Chronic heart failure with preserved ejection fraction ([facility])     Wt Readings from Last 3 Encounters:   24 93.2 kg (205 lb 8 oz)   24 92.1 kg (203 lb)   24 92.1 kg (203 lb)       strike-out   3/6/2024 12:17 201.5 Lbs Wheelchair dpeart (Manual)    strike-out   3/1/2024 22:32 200.5 Lbs Standing lborchick (Manual)    strike-out    2/9/2024 11:17 205.5 Lbs Wheelchair adowdy (Manual)    strike-out   2/1/2024 17:24 203.0 Lbs Mechanical Lift sgallin (Manual)    strike-out   1/12/2024 14:29 204.0 Lbs Mechanical Lift snolasco1 (Manual)      2D echocardiogram 10/22: left ventricular ejection fraction 61%. Grade 1 diastolic dysfunction   Monitor routine weight - stable recently, no alarming uptrend  Monitor volume status clinically - appears euvolemic  Continue lasix PO 40mg BID               CAD (coronary artery disease)     No acute cardiac complaints, seems stable  Continue aspirin, plavix, statin         Loose stools - Primary     Seems to be having intermittent loose stools since he had COVID in Dec 2023  Patient feels diarrhea is stable over time  Denies associated GI symptoms including abdominal pain, nausea, vomiting. He does pass gas. He reports good appetite.  Some days no diarrhea/BM, other days can have multiple BM, ranging from soft to watery  Stool occult blood has been negative  Stool PCR panel has been negative  He was on reglan for gastroparesis, this has been stopped  Banana flakes were tried and reportedly ineffective  Pepto bismol was tried and reportedly ineffective  Continues on probiotic and metamucil though does not feel these have changed the diarrhea significantly  GI consult has been placed and recommend for patient to be evaluated by GI in office, patient amenable. He reports last colonoscopy was many years ago, may benefit from further endoscopy/colonoscopy at discretion of GI  Will obtain XR abdomen to rule out any stool ball or other gross abnormality  Could consider further abdominal imaging however presently no acute abdominal exam findings  Patient does report hx of lactose intolerance but states he is careful and takes mainly lactose free products  Will check TSH and tTG-IgA with next labs, consider celiac  Monitor BMP routinely to monitor electrolytes - recently seems stable  Continue to monitor  Encourage PO  intake/hydration         S/P AKA (above knee amputation), right (HCC)     Cleared by vascular surgery for weight bearing status  continues limb  to right AKA  Follow up with  clinic to be fitted for prosthetic  Monitor site for any breakdown in skin         Advance care planning     Extensively discussed at a prior visit on 1/3/24  Briefly revisited today to verify, patient onfirms HCP as wife Ema and code status as DNR/DNI as prior         Gastroesophageal reflux disease without esophagitis     -stable  -continue PPI (did not tolerate recent attempt at discontinuing pantoprazole due to recurrence of symptoms)  -recommend OOB with meals, sit upright for at least 30 minutes afterwards, avoid trigger foods  -continue to monitor  -follow up with PCP, GI as appropriate           History of COVID-19     Patient had COVID in Dec 2023 with major symptoms including nausea, poor appetite, diarrhea; denied any notable associated respiratory symptoms  Was treated with paxlovid and supportive care  Feels he recovered fully from COVID except that he feels the diarrhea has been an ongoing concern since then, see plan under Diarrhea                  Chief Complaint     Follow up 60 day    History of Present Illness     Abner King is a 63 y.o. male who was seen today for follow up. PMH including s/p right AKA (Oct 2023), history of left BKA, type 2 DM, peripheral vascular disease, gastroparesis, CHF, hyperlipidemia, GERD, anxiety, vitamin deficiency, and atherosclerotic heart disease.       Patient seen and examined in room  Others present: none  Patient laying in bed with head elevated, able to reposition independently in bed  Appears comfortable, awake, alert, oriented to situation, able to converse appropriately  Patient Aox3, polite and in good spirits. Notes he has been doing OK overall, his main concern is he feels he has had ongoing diarrhea ever since he had COVID a few months ago. Apart from the  diarrhea he feels well; appetite good, no swallowing concerns, breathing fine, no pain anywhere, no recent abdominal pain/nausea/vomiting. Urinating fine (urinated into jug at beginning of visit). Last BM was yesterday. Regarding the diarrhea he notes that some days he does not have diarrhea, but on most days he will have one or more bowel movements, sometimes up to 6, he describes that they can start as soft and end up watery, but not bloody and not painful, and not happening every day, overall stable since he had COVID. No acute cardiopulmonary, abdominal, or urinary symptoms; see ROS for more details. He is working with prosthetic for left leg and  for right leg and for now gets around mainly with wheelchair and denies recent falls, eventually his goal is to get around via prosthetics.    No further questions or acute concerns identified.  No acute concerns per nursing.    Lab Review:   Old TSH from 2020 was 4/712 (slightly high)  12/28: BMP with K 3.5, CBC with Hb 10.7  1/2: BMP with K 3.4, CBC with Hb 11  2/1: lipids WNL, A1c 6.4  2/5: stool PCR panel including C.diff negative  2/9: BMP generally stable/non-actionable, GFR >100  2/19: stool occult blood negative    The following portions of the patient's history were reviewed and updated as appropriate: allergies, current medications, past family history, past medical history, past social history, past surgical history and problem list.    Review of Systems     Review of Systems   Constitutional:  Negative for appetite change, chills, diaphoresis and fever.   HENT:  Negative for drooling, ear pain, hearing loss, rhinorrhea, sore throat and trouble swallowing.    Eyes:  Negative for pain, discharge, redness, itching and visual disturbance.   Respiratory:  Negative for cough, chest tightness, shortness of breath and wheezing.    Cardiovascular:  Negative for chest pain and palpitations.   Gastrointestinal:  Positive for diarrhea. Negative for abdominal  pain, blood in stool, constipation, nausea and vomiting.   Genitourinary:  Negative for difficulty urinating, dysuria, flank pain and hematuria.   Musculoskeletal:  Positive for gait problem. Negative for arthralgias and back pain.   Skin:  Negative for color change.   Neurological:  Negative for dizziness, tremors, seizures, facial asymmetry, speech difficulty and weakness.   Psychiatric/Behavioral:  Negative for agitation, behavioral problems and confusion. The patient is not nervous/anxious and is not hyperactive.        Active Problem List     Patient Active Problem List   Diagnosis    Anxiety and depression    Type 2 diabetes mellitus with diabetic neuropathy, with long-term current use of insulin ([facility])    Hyperlipidemia    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    Vitamin D deficiency    PAD (peripheral artery disease) ([facility])    S/P BKA (below knee amputation), left ([facility])    Orthostatic hypotension    Diabetic gastroparesis     Class 2 severe obesity due to excess calories with serious comorbidity and body mass index (BMI) of 35.0 to 35.9 in adult     Triple vessel coronary artery disease    Hypertensive heart disease with congestive heart failure (HCC)    Chronic heart failure with preserved ejection fraction ([facility])    S/P CABG x 3    Diabetic autonomic neuropathy associated with type 2 diabetes mellitus ([facility])    Hyponatremia    Obstructive sleep apnea    Phantom limb syndrome without pain ([facility])    CAD (coronary artery disease)    Nodular rash    Elephantiasis nostras verrucosa    Embolism and thrombosis of arteries of the lower extremities ([facility])    Lymphedema of right lower extremity    Venous stasis dermatitis of right lower extremity    Abdominal distension    Non-pressure chronic ulcer of right calf with fat layer exposed ([facility])    Cellulitis of right ankle    Lymphedema in adult patient    Diabetic ulcer of right heel ([facility])    Loose stools    Elevated troponin     Gangrene (HCC)    Anemia    S/P AKA (above knee amputation), right (HCC)    Chills    Bilious vomiting with nausea    COVID-19    Advance care planning    Hypokalemia    Gastroesophageal reflux disease without esophagitis    Skin abnormality    History of COVID-19       Objective     Vital Signs:     BP: 112/53 mmHg  1/4/2024 01:30    Temp:97 °F  1/4/2024 01:30  Pulse:67 bpm  1/4/2024 01:30    Weight:201.5 Lbs  3/6/2024 12:17  Resp:18 Breaths/min  1/4/2024 01:30  BS:144 mg/dL  3/6/2024 10:01  O2:97 %  1/4/2024 01:30  Pain:0  3/6/2024 07:54      Physical Exam  Vitals reviewed.   Constitutional:       General: He is not in acute distress.     Appearance: He is not toxic-appearing or diaphoretic.   HENT:      Head: Normocephalic and atraumatic.      Right Ear: External ear normal.      Left Ear: External ear normal.      Nose: No rhinorrhea.      Mouth/Throat:      Mouth: Mucous membranes are moist.      Pharynx: Oropharynx is clear. No oropharyngeal exudate or posterior oropharyngeal erythema.   Eyes:      General: No scleral icterus.        Right eye: No discharge.         Left eye: No discharge.      Extraocular Movements: Extraocular movements intact.      Conjunctiva/sclera: Conjunctivae normal.      Pupils: Pupils are equal, round, and reactive to light.   Cardiovascular:      Rate and Rhythm: Normal rate and regular rhythm.   Pulmonary:      Effort: Pulmonary effort is normal. No respiratory distress.      Breath sounds: No wheezing or rales.   Abdominal:      General: Bowel sounds are normal.      Palpations: Abdomen is soft.      Tenderness: There is no abdominal tenderness. There is no guarding.   Genitourinary:     Penis: Normal.    Musculoskeletal:         General: No tenderness.      Cervical back: No rigidity.      Comments: S/p L BKA and R AKA, sites appear clean/dry/well healed   Skin:     General: Skin is warm and dry.      Coloration: Skin is not jaundiced.   Neurological:      General: No focal  deficit present.      Mental Status: He is alert and oriented to person, place, and time. Mental status is at baseline.   Psychiatric:         Mood and Affect: Mood normal.         Behavior: Behavior normal.         Thought Content: Thought content normal.         Judgment: Judgment normal.         Pertinent Laboratory/Diagnostic Studies:  Laboratory and Imaging studies reviewed. Full report in the paper chart.     Current Medications   Medications reviewed and updated in facility chart.      -Total time spent on this encounter today including documentation and workup review, face to face time, history and exam, and documentation/orders was approximately 40 minutes.  -This note will be copied to [facility] EMR where vitals and medication orders are placed.    Arias Fox D.O.  Geriatric Medicine  3/6/2024 2:12 PM

## 2024-03-06 NOTE — ASSESSMENT & PLAN NOTE
Lab Results   Component Value Date    HGBA1C 6.7 (A) 02/02/2023     A1c 6.4 as of 2/1/24  Monitor glucose - generally well controlled in low-mid 100s  Avoid hypoglycemia  Continue lantus 40u BID with hold parameters, consider weaning slightly if sugars remain well controlled or become lower  Encourage CCD  Continue to monitor, check A1c periodically

## 2024-03-06 NOTE — ASSESSMENT & PLAN NOTE
Wt Readings from Last 3 Encounters:   02/27/24 93.2 kg (205 lb 8 oz)   02/05/24 92.1 kg (203 lb)   01/31/24 92.1 kg (203 lb)       strike-out   3/6/2024 12:17 201.5 Lbs Wheelchair dpeart (Manual)    strike-out   3/1/2024 22:32 200.5 Lbs Standing lborchick (Manual)    strike-out   2/9/2024 11:17 205.5 Lbs Wheelchair adowdy (Manual)    strike-out   2/1/2024 17:24 203.0 Lbs Mechanical Lift sgallin (Manual)    strike-out   1/12/2024 14:29 204.0 Lbs Mechanical Lift snolasco1 (Manual)      2D echocardiogram 10/22: left ventricular ejection fraction 61%. Grade 1 diastolic dysfunction   Monitor routine weight - stable recently, no alarming uptrend  Monitor volume status clinically - appears euvolemic  Continue lasix PO 40mg BID

## 2024-03-06 NOTE — ASSESSMENT & PLAN NOTE
Seems to be having intermittent loose stools since he had COVID in Dec 2023  Patient feels diarrhea is stable over time  Denies associated GI symptoms including abdominal pain, nausea, vomiting. He does pass gas. He reports good appetite.  Some days no diarrhea/BM, other days can have multiple BM, ranging from soft to watery  Stool occult blood has been negative  Stool PCR panel has been negative  He was on reglan for gastroparesis, this has been stopped  Banana flakes were tried and reportedly ineffective  Pepto bismol was tried and reportedly ineffective  Continues on probiotic and metamucil though does not feel these have changed the diarrhea significantly  GI consult has been placed and recommend for patient to be evaluated by GI in office, patient amenable. He reports last colonoscopy was many years ago, may benefit from further endoscopy/colonoscopy at discretion of GI  Will obtain XR abdomen to rule out any stool ball or other gross abnormality  Could consider further abdominal imaging however presently no acute abdominal exam findings  Patient does report hx of lactose intolerance but states he is careful and takes mainly lactose free products  Will check TSH and tTG-IgA with next labs, consider celiac  Monitor BMP routinely to monitor electrolytes - recently seems stable  Continue to monitor  Encourage PO intake/hydration

## 2024-04-08 ENCOUNTER — NURSING HOME VISIT (OUTPATIENT)
Dept: GERIATRICS | Facility: OTHER | Age: 64
End: 2024-04-08
Payer: COMMERCIAL

## 2024-04-08 DIAGNOSIS — R19.5 LOOSE STOOLS: ICD-10-CM

## 2024-04-08 DIAGNOSIS — I73.9 PAD (PERIPHERAL ARTERY DISEASE) (HCC): ICD-10-CM

## 2024-04-08 DIAGNOSIS — K21.9 GASTROESOPHAGEAL REFLUX DISEASE WITHOUT ESOPHAGITIS: ICD-10-CM

## 2024-04-08 DIAGNOSIS — Z89.611 S/P AKA (ABOVE KNEE AMPUTATION), RIGHT (HCC): ICD-10-CM

## 2024-04-08 DIAGNOSIS — E11.40 TYPE 2 DIABETES MELLITUS WITH DIABETIC NEUROPATHY, WITH LONG-TERM CURRENT USE OF INSULIN (HCC): Primary | ICD-10-CM

## 2024-04-08 DIAGNOSIS — Z89.512 S/P BKA (BELOW KNEE AMPUTATION), LEFT (HCC): ICD-10-CM

## 2024-04-08 DIAGNOSIS — Z79.4 TYPE 2 DIABETES MELLITUS WITH DIABETIC NEUROPATHY, WITH LONG-TERM CURRENT USE OF INSULIN (HCC): Primary | ICD-10-CM

## 2024-04-08 PROCEDURE — 99309 SBSQ NF CARE MODERATE MDM 30: CPT

## 2024-04-15 RX ORDER — PSYLLIUM SEED (WITH DEXTROSE)
1 POWDER (GRAM) ORAL DAILY
COMMUNITY

## 2024-04-15 RX ORDER — LANOLIN ALCOHOL/MO/W.PET/CERES
3 CREAM (GRAM) TOPICAL
COMMUNITY

## 2024-04-16 NOTE — ASSESSMENT & PLAN NOTE
Hemoglobin A1c (02/01/24)  6.4  Continue insulin lantus 40 units SQ BID  Blood sugars reviewed from facility records  Acceptable  Continue to monitor Q6 months  Encourage CCD

## 2024-04-16 NOTE — PROGRESS NOTES
[facility]  [address], [address], [place] [address]  [phone]    NAME: Abner King  AGE: 63 y.o. SEX: male    Progress Note    Location: [facility]  POS: 32 ([facility])  Code Status: DNR    Chief complaint / Reason for visit:  Follow-up visit    Assessment/Plan:    Loose stools  Continues to have loose stools   Believes to have started since having covid in December 2023  Trial of banatrol which was ineffective and resident refused to take  Trial of pepto-bismol which he stated had been effective in the past  Was ineffective at facility  Currently on florastor and was started on metamucil  Re-education on taking metamucil to boost fiber intake  TSH and tTG-IgA checked on 03/13/24  TSH-3.19  tTG-IgA-<3  Denies N/V  Continue to monitor  Encourage po fluid intake    S/P AKA (above knee amputation), right ([facility])  Cleared by vascular surgery for weight bearing status  Started limb  to right AKA  Working with [facility]    Last seen on 04/02  Fitting will take place after appointment with [facility] , Dr. Chang Rivera  Will work with PT/OT once he has prosthetics     S/P BKA (below knee amputation), left ([facility])  Currently uses prosthetic   Will continue PT/OT when prosthetics are available for both legs  Continue to monitor skin for any breakdown     Type 2 diabetes mellitus with diabetic neuropathy, with long-term current use of insulin ([facility])  Hemoglobin A1c (02/01/24)  6.4  Continue insulin lantus 40 units SQ BID  Blood sugars reviewed from facility records  Acceptable  Continue to monitor Q6 months  Encourage CCD    Gastroesophageal reflux disease without esophagitis  Trial of discontinuation off protonix unsuccessful   Resident requesting to be added again d/t feeling acid reflux  Was originally discontinued as a thought of contributing to his loose bowels  Continue PPI   Encourage out of bed for meals, remain upright for at least 30 minutes  following meals  Avoid trigger foods  Follow up scheduled with GI    PAD (peripheral artery disease) (HCC)  S/p right AKA on 10/25  Continue aspirin 81 mg po daily  Continue atorvastatin 80 mg po daily  Continue clopidogrel 75 mg po daily  Monitor site of AKA daily with wound care completed daily      This is a 63 y.o. male seen today at [facility].  Medical history includes, not limited to, s/p right AKA, history of left BKA, type 2 DM, peripheral vascular disease, gastroparesis, CHF, hyperlipidemia, GERD, anxiety, vitamin deficiency, and atherosclerotic heart disease.      Resident is seen today for a routine follow up visit.  Upon entering the room he is laying in bed comfortable.  He is alert and oriented x 3.  States he is doing well.  He states that he is happy that he can go to another company to be fitted for bilateral lower extremity prosthetics.  Also discussed therapy holding off on some sessions until he has his prosthetics so they can continue to work with him.  He is able to continue his exercises on his own.  Discussed and re-educated him on taking his medication appropriately at the facility.  Working with him to control his loose bowels and increasing fiber intake.  He states that he understands and originally misunderstood some medications.          Reviewed resident with nursing staff. New orders placed.          Nursing and prior provider notes reviewed on this visit. Discussed visit with PCP and nursing staff/ supervisor.      Review of Systems   Constitutional:  Positive for activity change. Negative for fever.   HENT:  Negative for congestion and rhinorrhea.    Eyes:  Negative for pain and visual disturbance.   Respiratory:  Negative for cough and shortness of breath.    Cardiovascular:  Negative for chest pain and leg swelling.   Gastrointestinal:  Negative for abdominal pain, constipation, nausea and vomiting.        Reports of loose bowels   Genitourinary:  Negative for difficulty  urinating and dysuria.   Musculoskeletal:  Positive for gait problem (bilateral BKA). Negative for arthralgias.   Skin:  Positive for wound. Negative for color change and rash.   Neurological:  Negative for dizziness, syncope and weakness.   Psychiatric/Behavioral:  Negative for behavioral problems and confusion.    All other systems reviewed and are negative.         ALLERGY: Reviewed, unchanged  No Known Allergies    HISTORY:  Medical Hx: Reviewed, unchanged  Family Hx: Reviewed, unchanged  Soc Hx: Reviewed,  unchanged      Physical Exam  Vitals reviewed.   Constitutional:       General: He is not in acute distress.     Appearance: Normal appearance. He is not ill-appearing.   HENT:      Head: Normocephalic.      Right Ear: Tympanic membrane normal.      Left Ear: Tympanic membrane normal.      Nose: Nose normal. No congestion.      Mouth/Throat:      Mouth: Mucous membranes are moist.      Pharynx: Oropharynx is clear.   Eyes:      General:         Right eye: No discharge.         Left eye: No discharge.      Extraocular Movements: Extraocular movements intact.      Conjunctiva/sclera: Conjunctivae normal.      Pupils: Pupils are equal, round, and reactive to light.   Cardiovascular:      Rate and Rhythm: Normal rate and regular rhythm.      Pulses: Normal pulses.      Heart sounds: Normal heart sounds.   Pulmonary:      Effort: Pulmonary effort is normal. No respiratory distress.      Breath sounds: Normal breath sounds.   Chest:      Chest wall: No tenderness.   Abdominal:      General: Bowel sounds are normal.      Palpations: Abdomen is soft.      Tenderness: There is no abdominal tenderness.   Musculoskeletal:         General: Deformity present. No swelling. Normal range of motion.      Cervical back: Normal range of motion.   Skin:     General: Skin is warm and dry.   Neurological:      Mental Status: He is alert and oriented to person, place, and time. Mental status is at baseline.      Motor: No weakness.  "  Psychiatric:         Mood and Affect: Mood normal.         Behavior: Behavior normal.         Thought Content: Thought content normal.         Judgment: Judgment normal.            Laboratory results / Imaging reviewed: Hard copy/ies in medical chart:    No recent vitals available for this visit.    Current Medications:  All medications reviewed and updated in Nursing Home Chart    Please note: This note was completed in part utilizing a voice-recognition software may have been used in the preparation of this document. Grammatical errors, random word insertion, spelling mistakes, and incomplete sentences may be an occasional consequence of the system secondary to software limitations, ambient noise and hardware issues. Occasional wrong word or \"sound-alike\" substitutions may have occurred due to the inherent limitations of voice recognition software. At the time of dictation, efforts were made to edit, clarify and/or correct errors. Interpretation should be guided by context. Please read the chart carefully and recognize, using context, where substitutions have occurred. If you have any questions or concerns about the context, text or information contained within the body of this dictation, please contact myself, the provider, for further clarification.      CARLTON Givens  4/15/2024  "

## 2024-04-16 NOTE — ASSESSMENT & PLAN NOTE
Cleared by vascular surgery for weight bearing status  Started limb  to right AKA  Working with [facility]    Last seen on 04/02  Fitting will take place after appointment with [facility] Dr. Chang Rivera  Will work with PT/OT once he has prosthetics

## 2024-04-16 NOTE — ASSESSMENT & PLAN NOTE
Trial of discontinuation off protonix unsuccessful   Resident requesting to be added again d/t feeling acid reflux  Was originally discontinued as a thought of contributing to his loose bowels  Continue PPI   Encourage out of bed for meals, remain upright for at least 30 minutes following meals  Avoid trigger foods  Follow up scheduled with GI

## 2024-04-16 NOTE — ASSESSMENT & PLAN NOTE
Continues to have loose stools   Believes to have started since having covid in December 2023  Trial of banatrol which was ineffective and resident refused to take  Trial of pepto-bismol which he stated had been effective in the past  Was ineffective at facility  Currently on florastor and was started on metamucil  Re-education on taking metamucil to boost fiber intake  TSH and tTG-IgA checked on 03/13/24  TSH-3.19  tTG-IgA-<3  Denies N/V  Continue to monitor  Encourage po fluid intake

## 2024-04-16 NOTE — ASSESSMENT & PLAN NOTE
Currently uses prosthetic   Will continue PT/OT when prosthetics are available for both legs  Continue to monitor skin for any breakdown

## 2024-04-18 ENCOUNTER — TELEPHONE (OUTPATIENT)
Dept: GASTROENTEROLOGY | Facility: MEDICAL CENTER | Age: 64
End: 2024-04-18

## 2024-04-18 ENCOUNTER — OFFICE VISIT (OUTPATIENT)
Dept: GASTROENTEROLOGY | Facility: MEDICAL CENTER | Age: 64
End: 2024-04-18
Payer: COMMERCIAL

## 2024-04-18 VITALS
SYSTOLIC BLOOD PRESSURE: 125 MMHG | TEMPERATURE: 97.8 F | OXYGEN SATURATION: 98 % | DIASTOLIC BLOOD PRESSURE: 73 MMHG | HEART RATE: 67 BPM

## 2024-04-18 DIAGNOSIS — E11.43 DIABETIC GASTROPARESIS  (HCC): Chronic | ICD-10-CM

## 2024-04-18 DIAGNOSIS — K31.84 DIABETIC GASTROPARESIS  (HCC): Chronic | ICD-10-CM

## 2024-04-18 DIAGNOSIS — R19.4 CHANGE IN BOWEL HABITS: Primary | ICD-10-CM

## 2024-04-18 DIAGNOSIS — K21.9 GASTROESOPHAGEAL REFLUX DISEASE WITHOUT ESOPHAGITIS: ICD-10-CM

## 2024-04-18 PROCEDURE — 99204 OFFICE O/P NEW MOD 45 MIN: CPT | Performed by: NURSE PRACTITIONER

## 2024-04-18 RX ORDER — SODIUM, POTASSIUM,MAG SULFATES 17.5-3.13G
SOLUTION, RECONSTITUTED, ORAL ORAL
Qty: 177 ML | Refills: 0 | Status: SHIPPED | OUTPATIENT
Start: 2024-04-18

## 2024-04-18 RX ORDER — LINACLOTIDE 145 UG/1
145 CAPSULE, GELATIN COATED ORAL DAILY
Qty: 30 CAPSULE | Refills: 3 | Status: SHIPPED | OUTPATIENT
Start: 2024-04-18

## 2024-04-18 NOTE — TELEPHONE ENCOUNTER
Procedure: Colon  Date: 6/4/2024  Physician performing: Dr. wilson  Location of procedure:    Instructions given to patient: Golytely  Diabetic: Yes Lantus  Clearances: Yes Plavix, manage at [facility].

## 2024-04-18 NOTE — PROGRESS NOTES
[facility] - Outpatient Consultation  Abner King 63 y.o. male MRN: [id number]  Encounter: [id number]          ASSESSMENT AND PLAN:    Abner King is a 63 y.o. male who presents with complaint of change in bowel habits.    1.  Change in bowel habits  2.  Chronic constipation    Change in bowel habits since COVID infection 12/23.  Symptoms were were all GI related at that time.  Since that time he has had only loose stools.  He has no BMs for 2 to 3 days and then has loose stools 3-4 times a day for 1 to 2 days.  Denies any melena or hematochezia.  Reports he had stool studies x 2 and an obstructive series which showed a large amount of stool in colon.  He does have an underlying history of constipation.  Recently his mobility has been decreased status post right AKA.  He will be getting his prosthesis end of May or early June.  Reports he cannot sit as he would like to have a BM.  He needs to use a bedpan currently.  No recent antibiotic use.  No recent travel.  No new medications.  I suspect that he has an underlying constipation.  Did recommend we start Linzess and see how his bowels move with that medication.  Will also set up a colonoscopy to rule out any neoplasm or colon polyps.    -Obtain previous records from [facility] including labs/stools and imaging.  -Colonoscopy with GoLytely/Dulcolax prep in a hospital setting due to his comorbidities.  -Start Linzess 145 mcg daily.  Contact office in 2 weeks with update on how BMs are moving.  -Follow-up in office after testing    I obtained informed consent from the patient. The risks/benefits/alternatives of the procedure were discussed with the patient. Risks included, but not limited to, infection, bleeding, perforation, injury to organs in the abdomen, missed lesion and incomplete procedure were discussed. Patient was agreeable and electronic signature was  "obtained.    ______________________________________________________________________    HPI:    Abner King is a 63 y.o. male who presents with complaint of change in bowel habits.  He has a past medical history of type 2 diabetes, PAD, orthostatic hypotension, triple-vessel coronary artery disease, CHF, DVT (on Plavix), HEMA, diabetic gastroparesis, GERD, vitamin D deficiency, HLD, status post right AKA.    Has had a longstanding history of chronic constipation.  BMs were every 2 to 3 days formed.  He developed COVID 12/23 and had a right AKA 10/23.  Stools have changed since that time.  He reports his stools are every 2 to 3 days.  On the days he does have BMs they are loose several times per day and this can last up to 2 days.  Denies any melena or hematochezia.  Reports he has tried fiber, senna, probiotic and MiraLAX in the past with no help.  Reports MiraLAX gave him \"an upset stomach.\"  Patient currently is residing at [facility].  He reports he had stool studies x 2 that were negative recently as well as an obstructive series which noted large amount of stool in colon.  These records were not available during visit today.  Last colonoscopy was more than 5 years ago and he reports he had a polyp.  Denies any family history of any colon cancers or polyps.    Denies any other GI complaints during visit today.  History show he has diabetic gastroparesis.  Reports appetite is good.  Denies any nausea or vomiting.  Denies any heartburn or reflux.  He currently takes pantoprazole 20 mg daily.    No recent labs or imaging to review during visit today.      Previous EGD and colonoscopy    Reports last colonoscopy was more than 5 years ago when he had a polyp.    Not sure if he is ever had an EGD.        REVIEW OF SYSTEMS:    CONSTITUTIONAL: Denies any fever, chills, rigors, and weight loss.  HEENT: No earache or tinnitus. Denies hearing loss or visual disturbances.  CARDIOVASCULAR: No chest pain or palpitations. "   RESPIRATORY: Denies any cough, hemoptysis, shortness of breath or dyspnea on exertion.  GASTROINTESTINAL: As noted in the History of Present Illness.   GENITOURINARY: No problems with urination. Denies any hematuria or dysuria.  NEUROLOGIC: No dizziness or vertigo, denies headaches.   MUSCULOSKELETAL: Denies any muscle or joint pain.   SKIN: Denies skin rashes or itching.   ENDOCRINE: Denies excessive thirst. Denies intolerance to heat or cold.  PSYCHOSOCIAL: Denies depression or anxiety. Denies any recent memory loss.       Historical Information   Past Medical History:   Diagnosis Date   • Amputated below knee, left (HCC) 10/4/2018   • Anxiety    • Anxiety and depression    • Cataract    • Congestive cardiac failure (HCC)    • Coronary artery disease    • Depression    • Diabetes mellitus (HCC)    • Diabetic autonomic neuropathy associated with type 2 diabetes mellitus (HCC)     Last Assessed: 12/28/2017   • History of DVT (deep vein thrombosis)     left LE   • Hyperlipidemia    • Lymphedema of right lower extremity    • Obesity    • Orthostatic hypotension      Past Surgical History:   Procedure Laterality Date   • AMPUTATION ABOVE KNEE (AKA) Right 10/25/2023    Procedure: (AKA);  Surgeon: Marcia Servin MD;  Location: [facility];  Service: Vascular   • CORONARY ARTERY BYPASS GRAFT     • EYE SURGERY      cataracts bilateral   • LEG AMPUTATION THROUGH LOWER TIBIA AND FIBULA Left 8/3/2018    Procedure: AMPUTATION BELOW KNEE (BKA) L BKA;  Surgeon: Jonathan Rodríguez MD;  Location: [facility];  Service: Vascular   • HI CORONARY ARTERY BYP W/VEIN & ARTERY GRAFT 4 VEIN N/A 3/28/2018    Procedure: CORONARY ARTERY BYPASS GRAFT (CABG) x3 VESSELS, LIMA TO LAD, SVG--> PDA, SVG--> OM2,  RIGHT LEG EVH/SVH TO , INTRA-OP AMANDEEP;  Surgeon: Rocky Jenkins MD;  Location: [facility];  Service: Cardiac Surgery   • ROTATOR CUFF REPAIR Bilateral      Social History   Social History     Substance and Sexual Activity   Alcohol Use Never     Comment: rarely     Social History     Substance and Sexual Activity   Drug Use No     Social History     Tobacco Use   Smoking Status Former   • Current packs/day: 0.00   • Average packs/day: 1 pack/day for 12.0 years (12.0 ttl pk-yrs)   • Types: Cigarettes   • Start date: 3/23/1982   • Quit date: 3/23/1994   • Years since quittin.0   Smokeless Tobacco Never     Family History   Problem Relation Age of Onset   • Atrial fibrillation Mother    • Stroke Mother    • Hypertension Father    • Heart attack Paternal Grandfather 59   • Diabetes Maternal Grandmother    • Diabetes Maternal Grandfather    • Diabetes Maternal Aunt    • Diabetes Maternal Uncle        Meds/Allergies       Current Outpatient Medications:   •  acetaminophen (TYLENOL) 325 mg tablet  •  aspirin (ECOTRIN LOW STRENGTH) 81 mg EC tablet  •  atorvastatin (LIPITOR) 80 mg tablet  •  bisacodyl (FLEET) 10 MG/30ML ENEM  •  bismuth subsalicylate (PEPTO BISMOL) 524 mg/30 mL oral suspension  •  cholecalciferol (VITAMIN D3) 1,000 units tablet  •  clopidogrel (PLAVIX) 75 mg tablet  •  finasteride (PROSCAR) 5 mg tablet  •  furosemide (LASIX) 40 mg tablet  •  glucose blood test strip  •  Insulin Glargine Solostar (Lantus SoloStar) 100 UNIT/ML SOPN  •  linaCLOtide (Linzess) 145 MCG CAPS  •  magnesium hydroxide (MILK OF MAGNESIA) 400 mg/5 mL oral suspension  •  melatonin 3 mg  •  Na Sulfate-K Sulfate-Mg Sulf 17.5-3.13-1.6 GM/177ML SOLN  •  pantoprazole (PROTONIX) 20 mg tablet  •  Psyllium (Metamucil 4 in 1 Fiber) 25 % PACK  •  saccharomyces boulardii (FLORASTOR) 250 mg capsule  •  senna (SENOKOT) 8.6 MG tablet  •  tamsulosin (FLOMAX) 0.4 mg    No Known Allergies        Objective     Blood pressure 125/73, pulse 67, temperature 97.8 °F (36.6 °C), temperature source Tympanic, SpO2 98%. There is no height or weight on file to calculate BMI.        PHYSICAL EXAM:      General Appearance:   Alert, cooperative, no distress   HEENT:   Normocephalic, atraumatic,  "anicteric.     Neck:  Supple, symmetrical, trachea midline   Lungs:   Clear to auscultation bilaterally; no rales, rhonchi or wheezing; respirations unlabored    Heart::   Regular rate and rhythm; no murmur, rub, or gallop.   Abdomen:   Soft, non-tender, non-distended; normal bowel sounds; no masses, no organomegaly    Genitalia:   Deferred    Rectal:   Deferred    Extremities:  No cyanosis, clubbing or edema    Pulses:  2+ and symmetric    Skin:  No jaundice, rashes, or lesions    Lymph nodes:  No palpable cervical lymphadenopathy        Lab Results:   No visits with results within 1 Day(s) from this visit.   Latest known visit with results is:   No results displayed because visit has over 200 results.          Lab Results   Component Value Date    WBC 8.25 10/29/2023    HGB 9.2 (L) 10/29/2023    HCT 29.8 (L) 10/29/2023    MCV 87 10/29/2023     10/29/2023       Lab Results   Component Value Date     09/13/2016    SODIUM 137 10/29/2023    K 3.8 10/29/2023    CL 98 10/29/2023    CO2 34 (H) 10/29/2023    AGAP 5 10/29/2023    BUN 14 10/29/2023    CREATININE 0.70 10/29/2023    GLUC 163 (H) 10/29/2023    GLUF 139 (H) 02/05/2020    CALCIUM 8.3 (L) 10/29/2023    AST 15 10/20/2023    ALT 11 10/20/2023    ALKPHOS 52 10/20/2023    PROT 7.4 09/13/2016    TP 6.8 10/20/2023    BILITOT 0.4 09/13/2016    TBILI 0.57 10/20/2023    EGFR 99 10/29/2023       Lab Results   Component Value Date    .0 (H) 10/19/2023       Lab Results   Component Value Date    SUG9OQGEMGED 4.712 (H) 02/05/2020       No results found for: \"IRON\", \"TIBC\", \"FERRITIN\"    Radiology Results:   No results found.  "

## 2024-04-18 NOTE — TELEPHONE ENCOUNTER
Our mutual patient, Abner King, is scheduled for the following   procedure: Colon   On: 6/4/2024   With: Dr. Story    Abner King is taking the following blood thinner: plavix       Can this be stopped 5 days prior to the procedure?           Thank you  [facility]       Request fax to [facility]  with Dr. Arias Fox [phone] phone number

## 2024-04-23 NOTE — TELEPHONE ENCOUNTER
Call patient and left a message to let him know that plavix hold was approved.   Suturegard Intro: Intraoperative tissue expansion was performed, utilizing the SUTUREGARD device, in order to reduce wound tension.

## 2024-04-23 NOTE — PROGRESS NOTES
PA for freestyle    Submitted via    []CMM-KEY   [x][employer]-Case ID # [id number]   []Faxed to plan   []Other website   []Phone call Case ID #     Office notes sent, clinical questions answered. Awaiting determination    Turnaround time for your insurance to make a decision on your Prior Authorization can take 7-21 business days.              Pt's BP elevated at 184/90  BP trending upwards  Pt is asymptomatic and is resting comfortably  Contacted [facility] and spoke with Bonita Martínez who stated she will place orders for BP maintenance  Will continue to monitor pt

## 2024-04-30 ENCOUNTER — TELEPHONE (OUTPATIENT)
Dept: GASTROENTEROLOGY | Facility: MEDICAL CENTER | Age: 64
End: 2024-04-30

## 2024-04-30 NOTE — TELEPHONE ENCOUNTER
Talk to the nurse at [facility] and inform them that we fax the paper work for procedure prep and medication hold.

## 2024-05-08 ENCOUNTER — NURSING HOME VISIT (OUTPATIENT)
Dept: GERIATRICS | Facility: OTHER | Age: 64
End: 2024-05-08
Payer: COMMERCIAL

## 2024-05-08 DIAGNOSIS — F32.A ANXIETY AND DEPRESSION: ICD-10-CM

## 2024-05-08 DIAGNOSIS — K21.9 GASTROESOPHAGEAL REFLUX DISEASE WITHOUT ESOPHAGITIS: ICD-10-CM

## 2024-05-08 DIAGNOSIS — Z89.512 S/P BKA (BELOW KNEE AMPUTATION), LEFT (HCC): ICD-10-CM

## 2024-05-08 DIAGNOSIS — Z86.16 HISTORY OF COVID-19: ICD-10-CM

## 2024-05-08 DIAGNOSIS — E78.2 MIXED HYPERLIPIDEMIA: ICD-10-CM

## 2024-05-08 DIAGNOSIS — N40.1 BENIGN PROSTATIC HYPERPLASIA WITH URINARY RETENTION: ICD-10-CM

## 2024-05-08 DIAGNOSIS — I25.10 CORONARY ARTERY DISEASE INVOLVING NATIVE CORONARY ARTERY OF NATIVE HEART WITHOUT ANGINA PECTORIS: ICD-10-CM

## 2024-05-08 DIAGNOSIS — F41.9 ANXIETY AND DEPRESSION: ICD-10-CM

## 2024-05-08 DIAGNOSIS — Z89.611 S/P AKA (ABOVE KNEE AMPUTATION), RIGHT (HCC): ICD-10-CM

## 2024-05-08 DIAGNOSIS — E11.43 DIABETIC GASTROPARESIS  (HCC): Chronic | ICD-10-CM

## 2024-05-08 DIAGNOSIS — I50.32 CHRONIC HEART FAILURE WITH PRESERVED EJECTION FRACTION (HCC): ICD-10-CM

## 2024-05-08 DIAGNOSIS — K31.84 DIABETIC GASTROPARESIS  (HCC): Chronic | ICD-10-CM

## 2024-05-08 DIAGNOSIS — R19.5 LOOSE STOOLS: ICD-10-CM

## 2024-05-08 DIAGNOSIS — R33.8 BENIGN PROSTATIC HYPERPLASIA WITH URINARY RETENTION: ICD-10-CM

## 2024-05-08 DIAGNOSIS — Z79.4 TYPE 2 DIABETES MELLITUS WITH DIABETIC NEUROPATHY, WITH LONG-TERM CURRENT USE OF INSULIN (HCC): ICD-10-CM

## 2024-05-08 DIAGNOSIS — Z71.89 ADVANCE CARE PLANNING: ICD-10-CM

## 2024-05-08 DIAGNOSIS — I73.9 PAD (PERIPHERAL ARTERY DISEASE) (HCC): Primary | ICD-10-CM

## 2024-05-08 DIAGNOSIS — E11.40 TYPE 2 DIABETES MELLITUS WITH DIABETIC NEUROPATHY, WITH LONG-TERM CURRENT USE OF INSULIN (HCC): ICD-10-CM

## 2024-05-08 PROBLEM — R21 NODULAR RASH: Status: RESOLVED | Noted: 2020-02-04 | Resolved: 2024-05-08

## 2024-05-08 PROBLEM — R11.14 BILIOUS VOMITING WITH NAUSEA: Status: RESOLVED | Noted: 2023-12-29 | Resolved: 2024-05-08

## 2024-05-08 PROCEDURE — 99309 SBSQ NF CARE MODERATE MDM 30: CPT | Performed by: STUDENT IN AN ORGANIZED HEALTH CARE EDUCATION/TRAINING PROGRAM

## 2024-05-08 NOTE — ASSESSMENT & PLAN NOTE
Uses prosthetic  Monitor for skin breakdown  Will continue PT/OT when prosthetics are available for both legs

## 2024-05-08 NOTE — ASSESSMENT & PLAN NOTE
Wt Readings from Last 3 Encounters:   02/27/24 93.2 kg (205 lb 8 oz)   02/05/24 92.1 kg (203 lb)   01/31/24 92.1 kg (203 lb)     strike-out   5/3/2024 18:16 199.4 Lbs Mechanical Lift sandrew (Manual)    strike-out   4/3/2024 22:25 202.3 Lbs Wheelchair sgallin (Manual)    strike-out   3/15/2024 10:54 191.5 Lbs Wheelchair Ydiaz25 (Manual)    strike-out   3/6/2024 12:17 201.5 Lbs Wheelchair dpeart (Manual)    strike-out   3/1/2024 22:32 200.5 Lbs Standing lborchick (Manual)    strike-out   2/9/2024 11:17 205.5 Lbs Wheelchair adowdy (Manual)        2D echocardiogram 10/22: left ventricular ejection fraction 61%. Grade 1 diastolic dysfunction   Monitor routine weight - stable recently, no alarming uptrend  Monitor volume status clinically - appears euvolemic, no orthopnea  Continue lasix PO 40mg BID

## 2024-05-08 NOTE — ASSESSMENT & PLAN NOTE
Lab Results   Component Value Date    HGBA1C 6.7 (A) 02/02/2023     A1c 6.4 as of 2/1/24  Monitor glucose - generally well controlled in low-mid 100s; fasting sugars at times down to 80s-90s and sometimes a bit low later in day as well; very few readings >200  Avoid hypoglycemia  Continue lantus, will wean slightly to 38u BID with hold parameters, consider weaning slightly if sugars remain well controlled or become lower  Encourage CCD  Continue to monitor, check A1c periodically

## 2024-05-08 NOTE — PROGRESS NOTES
[facility]  Facility: [facility]    PROGRESS NOTE  Nursing Home Place of Service: nursing home place of service: POS 32 Unskilled- No Part A Coverage    NAME: Abner King  : 1960 AGE: 63 y.o. SEX: male MRN: [id number]  DATE OF ENCOUNTER: 2024    Assessment and Plan     Problem List Items Addressed This Visit       Anxiety and depression     Mood stable  Stressors include health complications, amputations which he is getting used to  Used to be on alprazolam PRN, has been weaned off as of 2024  Continue supportive care  Monitor for change in mood/behaviors          Type 2 diabetes mellitus with diabetic neuropathy, with long-term current use of insulin ([facility])       Lab Results   Component Value Date    HGBA1C 6.7 (A) 2023     A1c 6.4 as of 24  Monitor glucose - generally well controlled in low-mid 100s; fasting sugars at times down to 80s-90s and sometimes a bit low later in day as well; very few readings >200  Avoid hypoglycemia  Continue lantus, will wean slightly to 38u BID with hold parameters, consider weaning slightly if sugars remain well controlled or become lower  Encourage CCD  Continue to monitor, check A1c periodically         Hyperlipidemia     Continue statin  Monitor lipids periodically - appear well controlled         PAD (peripheral artery disease) ([facility]) - Primary     S/p right AKA on 10/25/23  Continue aspirin, statin, plavix            S/P BKA (below knee amputation), left ([facility])     Uses prosthetic  Monitor for skin breakdown  Will continue PT/OT when prosthetics are available for both legs         Diabetic gastroparesis  ([facility]) (Chronic)       Lab Results   Component Value Date    HGBA1C 6.7 (A) 2023     Hx of diabetic gastroparesis with frequent constipation in the past however recently diarrhea has been his main concern  See plan under Diarrhea         Chronic heart failure with preserved ejection fraction ([facility])     Wt Readings from Last 3  "Encounters:   02/27/24 93.2 kg (205 lb 8 oz)   02/05/24 92.1 kg (203 lb)   01/31/24 92.1 kg (203 lb)     strike-out   5/3/2024 18:16 199.4 Lbs Mechanical Lift sandrew (Manual)    strike-out   4/3/2024 22:25 202.3 Lbs Wheelchair sgallin (Manual)    strike-out   3/15/2024 10:54 191.5 Lbs Wheelchair [id number] (Manual)    strike-out   3/6/2024 12:17 201.5 Lbs Wheelchair dpeart (Manual)    strike-out   3/1/2024 22:32 200.5 Lbs Standing lborchick (Manual)    strike-out   2/9/2024 11:17 205.5 Lbs Wheelchair adowdy (Manual)        2D echocardiogram 10/22: left ventricular ejection fraction 61%. Grade 1 diastolic dysfunction   Monitor routine weight - stable recently, no alarming uptrend  Monitor volume status clinically - appears euvolemic, no orthopnea  Continue lasix PO 40mg BID             CAD (coronary artery disease)     No acute cardiac complaints, seems stable  Continue aspirin, plavix, statin         Loose stools     Seems to be having intermittent loose stools since he had COVID in Dec 2023  Patient feels diarrhea is stable over time, perhaps slightly improved  Denies associated GI symptoms including abdominal pain, nausea, vomiting. He does pass gas. He reports good appetite.  Some days no diarrhea/BM, other days can have multiple BM, ranging from soft to watery  Stool occult blood has been negative  Stool PCR panel has been negative  He was on reglan for gastroparesis, this has been stopped  Banana flakes were tried and reportedly ineffective  Pepto bismol was tried and reportedly ineffective  Continues on probiotic and metamucil though does not feel these have changed the diarrhea significantly  XR abdomen 3/6/24 with presence of stool but \"There is no bowel obstruction\"  Could consider further abdominal imaging however presently no acute abdominal exam findings  Patient does report hx of lactose intolerance but states he is careful and takes mainly lactose free products  Recent TSH and tTG-IgA WNL  He was " evaluated by GI in office 4/18/24  Started on linzess  Scheduled for colonoscopy 6/4/24 with prep to be done beforehand  Monitor BMP routinely to monitor electrolytes - recently seems stable  Continue to monitor  Encourage PO intake/hydration         S/P AKA (above knee amputation), right (HCC)     Cleared by vascular surgery for weight bearing status  continues limb  to right AKA  Follow up with [facility] clinic to be fitted for prosthetic  Monitor site for any breakdown in skin  PT/OT as appropriate         Advance care planning     Extensively discussed at a prior visit on 1/3/24  Briefly revisited today to verify/eval for changes in patient's wishes, patient confirms HCP as wife Ema and code status as DNR/DNI as prior         Gastroesophageal reflux disease without esophagitis     -stable  -continue PPI (did not tolerate recent attempt at discontinuing pantoprazole due to recurrence of symptoms)  -recommend OOB with meals, sit upright for at least 30 minutes afterwards, avoid trigger foods  -continue to monitor  -follow up with PCP, GI as appropriate         History of COVID-19     Patient had COVID in Dec 2023 with major symptoms including nausea, poor appetite, diarrhea; denied any notable associated respiratory symptoms  Was treated with paxlovid and supportive care  Feels he recovered fully from COVID except that he feels the diarrhea has been an ongoing concern since then, see plan under Diarrhea         Benign prostatic hyperplasia with urinary retention     Noted hx of BPH with hx of urinary retention per chart review  Continues on tamsulosin, finasteride  Appears stable  Continue to monitor                Chief Complaint     Follow up 60 day    History of Present Illness     Abner King is a 63 y.o. male who was seen today for follow up. PMH including s/p right AKA (Oct 2023), history of left BKA, type 2 DM, peripheral vascular disease, gastroparesis, CHF, hyperlipidemia, GERD, anxiety, vitamin  deficiency, and atherosclerotic heart disease     Patient seen and examined in room  Others present: none  Patient laying in bed, able to reposition independently  Appears comfortable, awake, alert, oriented to situation, able to converse appropriately  Patient Aox3, polite and in good spirits. Notes he has been doing well recently overall. No acute changes since last visit. Feels his chronic diarrhea (issue seemed to start when he had COVID some time ago) is overall stable and perhaps slightly improving over time; stool typically loose but not as watery as it used to be; denies any abdominal pain, N/V, blood in stool, or other acute GI symptoms; feels his groin irritation from diarrhea/frequent cleaning is improved compared to last visit. Apart from the diarrhea concern he feels well; appetite good, no swallowing concerns, breathing fine, no pain anywhere, no recent abdominal pain/nausea/vomiting. Urinating fine independently without acute symptoms. No acute cardiopulmonary, abdominal, or urinary symptoms; see ROS for more details. He is working with prosthetic for left leg and will be shortly getting prosthetic for right leg and is looking forward to working more with therapy; for now gets around mainly with wheelchair and denies recent falls, eventually his goal is to get around via prosthetics.     No further questions or acute concerns identified.  No acute concerns per nursing.     Lab Review:   Old TSH from 2020 was 4/712 (slightly high)  12/28: BMP with K 3.5, CBC with Hb 10.7  1/2: BMP with K 3.4, CBC with Hb 11  2/1: lipids WNL, A1c 6.4  2/5: stool PCR panel including C.diff negative  2/9: BMP generally stable/non-actionable, GFR >100  2/19: stool occult blood negative  3/13/24: TSH WNL; ttg iga antibody WNL    The following portions of the patient's history were reviewed and updated as appropriate: allergies, current medications, past family history, past medical history, past social history, past  surgical history and problem list.    Review of Systems     Review of Systems   Constitutional:  Negative for appetite change, chills, diaphoresis and fever.   HENT:  Negative for drooling, ear pain, hearing loss, rhinorrhea, sore throat and trouble swallowing.    Eyes:  Negative for pain, discharge, redness, itching and visual disturbance.   Respiratory:  Negative for cough, chest tightness, shortness of breath and wheezing.    Cardiovascular:  Negative for chest pain and palpitations.   Gastrointestinal:  Positive for diarrhea (stable/improved). Negative for abdominal pain, blood in stool, constipation, nausea and vomiting.   Genitourinary:  Negative for difficulty urinating, dysuria, flank pain and hematuria.   Musculoskeletal:  Positive for gait problem. Negative for arthralgias and back pain.   Skin:  Negative for color change.   Neurological:  Negative for dizziness, tremors, seizures, facial asymmetry, speech difficulty and weakness.   Psychiatric/Behavioral:  Negative for agitation, behavioral problems and confusion. The patient is not nervous/anxious and is not hyperactive.        Active Problem List     Patient Active Problem List   Diagnosis    Anxiety and depression    Type 2 diabetes mellitus with diabetic neuropathy, with long-term current use of insulin ([facility])    Hyperlipidemia    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    Vitamin D deficiency    PAD (peripheral artery disease) ([facility])    S/P BKA (below knee amputation), left ([facility])    Orthostatic hypotension    Diabetic gastroparesis  ([facility])    Class 2 severe obesity due to excess calories with serious comorbidity and body mass index (BMI) of 35.0 to 35.9 in adult ([facility])    Triple vessel coronary artery disease    Hypertensive heart disease with congestive heart failure ([facility])    Chronic heart failure with preserved ejection fraction ([facility])    S/P CABG x 3    Diabetic autonomic neuropathy associated with type 2  diabetes mellitus (HCC)    Hyponatremia    Obstructive sleep apnea    Phantom limb syndrome without pain (HCC)    CAD (coronary artery disease)    Elephantiasis [place]a    Embolism and thrombosis of arteries of the lower extremities (HCC)    Lymphedema of right lower extremity    Venous stasis dermatitis of right lower extremity    Abdominal distension    Non-pressure chronic ulcer of right calf with fat layer exposed (HCC)    Cellulitis of right ankle    Lymphedema in adult patient    Diabetic ulcer of right heel (HCC)    Loose stools    Elevated troponin    Gangrene ([facility])    Anemia    S/P AKA (above knee amputation), right (HCC)    Chills    COVID-19    Advance care planning    Hypokalemia    Gastroesophageal reflux disease without esophagitis    Skin abnormality    History of COVID-19    Benign prostatic hyperplasia with urinary retention       Objective     Vital Signs:     BP: 118/64 mmHg  4/22/2024 22:41   Temp:97.5 °F  5/5/2024 08:59 Pulse:74 bpm  5/5/2024 08:59 Weight:199.4 Lbs  5/3/2024 18:16   Resp:18 Breaths/min  5/5/2024 08:59 BS:122 mg/dL  5/8/2024 08:20 O2:96 %  3/22/2024 09:55 Pain:0  5/8/2024 08:17       Physical Exam  Vitals reviewed.   Constitutional:       General: He is not in acute distress.     Appearance: He is not toxic-appearing or diaphoretic.   HENT:      Head: Normocephalic and atraumatic.      Right Ear: External ear normal.      Left Ear: External ear normal.      Nose: Nose normal. No rhinorrhea.      Mouth/Throat:      Mouth: Mucous membranes are moist.      Pharynx: Oropharynx is clear. No oropharyngeal exudate or posterior oropharyngeal erythema.   Eyes:      General: No scleral icterus.        Right eye: No discharge.         Left eye: No discharge.      Extraocular Movements: Extraocular movements intact.      Conjunctiva/sclera: Conjunctivae normal.      Pupils: Pupils are equal, round, and reactive to light.   Cardiovascular:      Rate and Rhythm: Normal rate and  regular rhythm.   Pulmonary:      Effort: Pulmonary effort is normal. No respiratory distress.      Breath sounds: No wheezing or rales.   Abdominal:      General: Bowel sounds are normal. There is no distension.      Palpations: Abdomen is soft.      Tenderness: There is no abdominal tenderness. There is no guarding.   Musculoskeletal:         General: No tenderness.      Cervical back: No rigidity.      Comments: S/p L BKA and R AKA, sites appear clean/dry/well healed   Skin:     General: Skin is warm and dry.      Coloration: Skin is not jaundiced.   Neurological:      General: No focal deficit present.      Mental Status: He is alert and oriented to person, place, and time. Mental status is at baseline.   Psychiatric:         Mood and Affect: Mood normal.         Behavior: Behavior normal.         Thought Content: Thought content normal.         Judgment: Judgment normal.         Pertinent Laboratory/Diagnostic Studies:  Laboratory and Imaging studies reviewed. Full report in the paper chart.     Current Medications   Medications reviewed and updated in facility chart.      -Total time spent on this encounter today including documentation and workup review, face to face time, history and exam, and documentation/orders was approximately 35 minutes.  -This note will be copied to [facility] EMR where vitals and medication orders are placed.    Arias Fox D.O.  Geriatric Medicine  5/8/2024 1:45 PM

## 2024-05-08 NOTE — ASSESSMENT & PLAN NOTE
"Seems to be having intermittent loose stools since he had COVID in Dec 2023  Patient feels diarrhea is stable over time, perhaps slightly improved  Denies associated GI symptoms including abdominal pain, nausea, vomiting. He does pass gas. He reports good appetite.  Some days no diarrhea/BM, other days can have multiple BM, ranging from soft to watery  Stool occult blood has been negative  Stool PCR panel has been negative  He was on reglan for gastroparesis, this has been stopped  Banana flakes were tried and reportedly ineffective  Pepto bismol was tried and reportedly ineffective  Continues on probiotic and metamucil though does not feel these have changed the diarrhea significantly  XR abdomen 3/6/24 with presence of stool but \"There is no bowel obstruction\"  Could consider further abdominal imaging however presently no acute abdominal exam findings  Patient does report hx of lactose intolerance but states he is careful and takes mainly lactose free products  Recent TSH and tTG-IgA WNL  He was evaluated by GI in office 4/18/24  Started on linzess  Scheduled for colonoscopy 6/4/24 with prep to be done beforehand  Monitor BMP routinely to monitor electrolytes - recently seems stable  Continue to monitor  Encourage PO intake/hydration  "

## 2024-05-08 NOTE — ASSESSMENT & PLAN NOTE
Extensively discussed at a prior visit on 1/3/24  Briefly revisited today to verify/eval for changes in patient's wishes, patient confirms HCP as wife Ema and code status as DNR/DNI as prior

## 2024-05-08 NOTE — ASSESSMENT & PLAN NOTE
Noted hx of BPH with hx of urinary retention per chart review  Continues on tamsulosin, finasteride  Appears stable  Continue to monitor

## 2024-05-08 NOTE — ASSESSMENT & PLAN NOTE
Cleared by vascular surgery for weight bearing status  continues limb  to right AKA  Follow up with [facility] clinic to be fitted for prosthetic  Monitor site for any breakdown in skin  PT/OT as appropriate

## 2024-05-08 NOTE — ASSESSMENT & PLAN NOTE
Mood stable  Stressors include health complications, amputations which he is getting used to  Used to be on alprazolam PRN, has been weaned off as of April 2024  Continue supportive care  Monitor for change in mood/behaviors

## 2024-05-10 ENCOUNTER — NURSING HOME VISIT (OUTPATIENT)
Dept: GERIATRICS | Facility: OTHER | Age: 64
End: 2024-05-10
Payer: COMMERCIAL

## 2024-05-10 DIAGNOSIS — Z89.512 S/P BKA (BELOW KNEE AMPUTATION), LEFT (HCC): ICD-10-CM

## 2024-05-10 DIAGNOSIS — Z89.611 S/P AKA (ABOVE KNEE AMPUTATION), RIGHT (HCC): ICD-10-CM

## 2024-05-10 DIAGNOSIS — R26.2 AMBULATORY DYSFUNCTION: Primary | ICD-10-CM

## 2024-05-10 PROCEDURE — 99308 SBSQ NF CARE LOW MDM 20: CPT

## 2024-05-20 VITALS
OXYGEN SATURATION: 96 % | TEMPERATURE: 97.5 F | SYSTOLIC BLOOD PRESSURE: 118 MMHG | BODY MASS INDEX: 26.31 KG/M2 | RESPIRATION RATE: 18 BRPM | DIASTOLIC BLOOD PRESSURE: 64 MMHG | HEART RATE: 74 BPM | WEIGHT: 199.4 LBS

## 2024-05-20 PROBLEM — R26.2 AMBULATORY DYSFUNCTION: Status: ACTIVE | Noted: 2024-05-20

## 2024-05-21 NOTE — ASSESSMENT & PLAN NOTE
Secondary to bilateral lower extremity BKA  Currently has prosthetic for LLE, working with [facility] for RLE  Spends majority of time in bed  Pressure on sacrum  Suggested pillow or wedge under to reduce prolonged pressure, resident refused  No skin breakdown noticed on exam  Continue to encourage OOB  Continue to encourage frequent repositioning

## 2024-05-21 NOTE — PROGRESS NOTES
[address], [address], [place] [address]  [phone]    NAME: Abner King  AGE: 63 y.o. SEX: male    Progress Note    Location: [facility]  POS: 32 ([facility])  Code Status: DNR    Chief complaint / Reason for visit: Acute Visit    Assessment/Plan:    S/P BKA (below knee amputation), left (HCC)  Uses prosthetic  Monitor for skin breakdown  Will continue PT/OT when prosthetics are available for both legs    S/P AKA (above knee amputation), right (HCC)  Cleared by vascular surgery for weight bearing status  Has been following with [facility] for fitting of RLE prosthetic    Monitor site for any breakdown in skin  PT/OT as appropriate    Ambulatory dysfunction  Secondary to bilateral lower extremity BKA  Currently has prosthetic for LLE, working with [facility] for RLE  Spends majority of time in bed  Pressure on sacrum  Suggested pillow or wedge under to reduce prolonged pressure, resident refused  No skin breakdown noticed on exam  Continue to encourage OOB  Continue to encourage frequent repositioning         This is a 63 y.o. male seen today at [facility].  Medical history includes, not limited to, s/p right AKA, history of left BKA, type 2 DM, peripheral vascular disease, gastroparesis, CHF, hyperlipidemia, GERD, anxiety, vitamin deficiency, and atherosclerotic heart disease.      Resident is seen today for an acute visit.  Resident stating to nursing staff that he has been having discomfort on his sacrum.  Upon entering the room he is laying in bed comfortable.  He is alert and oriented x 3.  He is agreeable to an exam to be able to show the area of his discomfort. He states he has had this pain on and off for the past couple of years.  He notices it the most when he is being helped to be cleaned up or sitting in a certain position while in bed.      On exam there are no breaks in his skin, pain appears to be at base of coccyx.        Reviewed  resident with nursing staff.         Nursing and prior provider notes reviewed on this visit. Discussed visit with PCP and nursing staff/ supervisor.      Review of Systems   Constitutional:  Positive for activity change. Negative for fever.   HENT:  Negative for congestion and rhinorrhea.    Eyes:  Negative for pain and visual disturbance.   Respiratory:  Negative for cough and shortness of breath.    Cardiovascular:  Negative for chest pain and leg swelling.   Gastrointestinal:  Negative for abdominal pain, constipation, nausea and vomiting.        Reports of loose bowels   Genitourinary:  Negative for difficulty urinating and dysuria.   Musculoskeletal:  Positive for gait problem (bilateral BKA). Negative for arthralgias.   Skin:  Positive for wound. Negative for color change and rash.   Neurological:  Negative for dizziness, syncope and weakness.   Psychiatric/Behavioral:  Negative for behavioral problems and confusion.    All other systems reviewed and are negative.         ALLERGY: Reviewed, unchanged  No Known Allergies    HISTORY:  Medical Hx: Reviewed, unchanged  Family Hx: Reviewed, unchanged  Soc Hx: Reviewed,  unchanged      Physical Exam  Vitals reviewed.   Constitutional:       General: He is not in acute distress.     Appearance: Normal appearance. He is not ill-appearing.   HENT:      Head: Normocephalic.      Right Ear: Tympanic membrane normal.      Left Ear: Tympanic membrane normal.      Nose: Nose normal. No congestion.      Mouth/Throat:      Mouth: Mucous membranes are moist.      Pharynx: Oropharynx is clear.   Eyes:      General:         Right eye: No discharge.         Left eye: No discharge.      Extraocular Movements: Extraocular movements intact.      Conjunctiva/sclera: Conjunctivae normal.      Pupils: Pupils are equal, round, and reactive to light.   Cardiovascular:      Rate and Rhythm: Normal rate and regular rhythm.      Pulses: Normal pulses.      Heart sounds: Normal heart  "sounds.   Pulmonary:      Effort: Pulmonary effort is normal. No respiratory distress.      Breath sounds: Normal breath sounds.   Chest:      Chest wall: No tenderness.   Abdominal:      General: Bowel sounds are normal.      Palpations: Abdomen is soft.      Tenderness: There is no abdominal tenderness.   Musculoskeletal:         General: Deformity present. No swelling. Normal range of motion.      Cervical back: Normal range of motion.   Skin:     General: Skin is warm and dry.   Neurological:      Mental Status: He is alert and oriented to person, place, and time. Mental status is at baseline.      Motor: No weakness.   Psychiatric:         Mood and Affect: Mood normal.         Behavior: Behavior normal.         Thought Content: Thought content normal.         Judgment: Judgment normal.            Laboratory results / Imaging reviewed: Hard copy/ies in medical chart:    BP: 118/64, Pulse: 74, Temp: 97.5, Wt: 199. 4 lbs, Resp: 18, SpO2: 96%    Current Medications:  All medications reviewed and updated in Nursing Home Chart    Please note: This note was completed in part utilizing a voice-recognition software may have been used in the preparation of this document. Grammatical errors, random word insertion, spelling mistakes, and incomplete sentences may be an occasional consequence of the system secondary to software limitations, ambient noise and hardware issues. Occasional wrong word or \"sound-alike\" substitutions may have occurred due to the inherent limitations of voice recognition software. At the time of dictation, efforts were made to edit, clarify and/or correct errors. Interpretation should be guided by context. Please read the chart carefully and recognize, using context, where substitutions have occurred. If you have any questions or concerns about the context, text or information contained within the body of this dictation, please contact myself, the provider, for further clarification.      Christine" CARLTON Mi  5/20/2024

## 2024-05-21 NOTE — ASSESSMENT & PLAN NOTE
Cleared by vascular surgery for weight bearing status  Has been following with [facility] for fitting of RLE prosthetic    Monitor site for any breakdown in skin  PT/OT as appropriate

## 2024-06-03 ENCOUNTER — TELEPHONE (OUTPATIENT)
Dept: OTHER | Facility: OTHER | Age: 64
End: 2024-06-03

## 2024-06-04 ENCOUNTER — ANESTHESIA (OUTPATIENT)
Dept: GASTROENTEROLOGY | Facility: HOSPITAL | Age: 64
End: 2024-06-04

## 2024-06-04 ENCOUNTER — ANESTHESIA EVENT (OUTPATIENT)
Dept: GASTROENTEROLOGY | Facility: HOSPITAL | Age: 64
End: 2024-06-04

## 2024-06-04 ENCOUNTER — HOSPITAL ENCOUNTER (OUTPATIENT)
Dept: GASTROENTEROLOGY | Facility: HOSPITAL | Age: 64
Setting detail: OUTPATIENT SURGERY
Discharge: HOME | End: 2024-06-04
Admitting: INTERNAL MEDICINE
Payer: COMMERCIAL

## 2024-06-04 VITALS
SYSTOLIC BLOOD PRESSURE: 140 MMHG | OXYGEN SATURATION: 97 % | DIASTOLIC BLOOD PRESSURE: 67 MMHG | HEART RATE: 78 BPM | RESPIRATION RATE: 12 BRPM | TEMPERATURE: 97.5 F

## 2024-06-04 DIAGNOSIS — R19.4 CHANGE IN BOWEL HABITS: ICD-10-CM

## 2024-06-04 LAB — GLUCOSE SERPL-MCNC: 90 MG/DL (ref 65–140)

## 2024-06-04 PROCEDURE — 45385 COLONOSCOPY W/LESION REMOVAL: CPT | Performed by: INTERNAL MEDICINE

## 2024-06-04 PROCEDURE — 45380 COLONOSCOPY AND BIOPSY: CPT | Performed by: INTERNAL MEDICINE

## 2024-06-04 PROCEDURE — 88305 TISSUE EXAM BY PATHOLOGIST: CPT | Performed by: STUDENT IN AN ORGANIZED HEALTH CARE EDUCATION/TRAINING PROGRAM

## 2024-06-04 PROCEDURE — 82948 REAGENT STRIP/BLOOD GLUCOSE: CPT

## 2024-06-04 RX ORDER — SODIUM CHLORIDE, SODIUM LACTATE, POTASSIUM CHLORIDE, CALCIUM CHLORIDE 600; 310; 30; 20 MG/100ML; MG/100ML; MG/100ML; MG/100ML
INJECTION, SOLUTION INTRAVENOUS CONTINUOUS PRN
Status: DISCONTINUED | OUTPATIENT
Start: 2024-06-04 | End: 2024-06-04

## 2024-06-04 RX ORDER — LIDOCAINE HYDROCHLORIDE 10 MG/ML
INJECTION, SOLUTION EPIDURAL; INFILTRATION; INTRACAUDAL; PERINEURAL AS NEEDED
Status: DISCONTINUED | OUTPATIENT
Start: 2024-06-04 | End: 2024-06-04

## 2024-06-04 RX ORDER — SODIUM CHLORIDE, SODIUM LACTATE, POTASSIUM CHLORIDE, CALCIUM CHLORIDE 600; 310; 30; 20 MG/100ML; MG/100ML; MG/100ML; MG/100ML
125 INJECTION, SOLUTION INTRAVENOUS CONTINUOUS
Status: DISCONTINUED | OUTPATIENT
Start: 2024-06-04 | End: 2024-06-08 | Stop reason: HOSPADM

## 2024-06-04 RX ORDER — PROPOFOL 10 MG/ML
INJECTION, EMULSION INTRAVENOUS AS NEEDED
Status: DISCONTINUED | OUTPATIENT
Start: 2024-06-04 | End: 2024-06-04

## 2024-06-04 RX ORDER — SODIUM CHLORIDE, SODIUM LACTATE, POTASSIUM CHLORIDE, CALCIUM CHLORIDE 600; 310; 30; 20 MG/100ML; MG/100ML; MG/100ML; MG/100ML
75 INJECTION, SOLUTION INTRAVENOUS CONTINUOUS
Status: CANCELLED | OUTPATIENT
Start: 2024-06-04

## 2024-06-04 RX ADMIN — LIDOCAINE HYDROCHLORIDE 50 MG: 10 INJECTION, SOLUTION EPIDURAL; INFILTRATION; INTRACAUDAL; PERINEURAL at 08:01

## 2024-06-04 RX ADMIN — Medication 40 MG: at 08:11

## 2024-06-04 RX ADMIN — PROPOFOL 20 MG: 10 INJECTION, EMULSION INTRAVENOUS at 08:21

## 2024-06-04 RX ADMIN — PROPOFOL 150 MG: 10 INJECTION, EMULSION INTRAVENOUS at 08:01

## 2024-06-04 RX ADMIN — PROPOFOL 20 MG: 10 INJECTION, EMULSION INTRAVENOUS at 08:27

## 2024-06-04 RX ADMIN — PROPOFOL 30 MG: 10 INJECTION, EMULSION INTRAVENOUS at 08:06

## 2024-06-04 RX ADMIN — PROPOFOL 20 MG: 10 INJECTION, EMULSION INTRAVENOUS at 08:09

## 2024-06-04 RX ADMIN — PROPOFOL 20 MG: 10 INJECTION, EMULSION INTRAVENOUS at 08:33

## 2024-06-04 RX ADMIN — PROPOFOL 20 MG: 10 INJECTION, EMULSION INTRAVENOUS at 08:13

## 2024-06-04 RX ADMIN — SODIUM CHLORIDE, SODIUM LACTATE, POTASSIUM CHLORIDE, AND CALCIUM CHLORIDE 125 ML/HR: .6; .31; .03; .02 INJECTION, SOLUTION INTRAVENOUS at 07:30

## 2024-06-04 RX ADMIN — SODIUM CHLORIDE, SODIUM LACTATE, POTASSIUM CHLORIDE, AND CALCIUM CHLORIDE: .6; .31; .03; .02 INJECTION, SOLUTION INTRAVENOUS at 07:29

## 2024-06-04 NOTE — H&P
History and Physical -  [facility]  Abner King 63 y.o. male MRN: [id number]                  HPI: Abner King is a 63 y.o. year old male who presents for CRC screening.      REVIEW OF SYSTEMS: Per the HPI, and otherwise unremarkable.    Historical Information   Past Medical History:   Diagnosis Date    Amputated below knee, left (HCC) 10/4/2018    Anxiety     Anxiety and depression     Cataract     Congestive cardiac failure (HCC)     Coronary artery disease     Depression     Diabetes mellitus (HCC)     Diabetic autonomic neuropathy associated with type 2 diabetes mellitus (HCC)     Last Assessed: 12/28/2017    History of DVT (deep vein thrombosis)     left LE    Hyperlipidemia     Lymphedema of right lower extremity     Obesity     Orthostatic hypotension      Past Surgical History:   Procedure Laterality Date    AMPUTATION ABOVE KNEE (AKA) Right 10/25/2023    Procedure: (AKA);  Surgeon: Marcia Servin MD;  Location: [facility];  Service: Vascular    CORONARY ARTERY BYPASS GRAFT      EYE SURGERY      cataracts bilateral    LEG AMPUTATION THROUGH LOWER TIBIA AND FIBULA Left 8/3/2018    Procedure: AMPUTATION BELOW KNEE (BKA) L BKA;  Surgeon: Jonathan Rodríguez MD;  Location: [facility];  Service: Vascular    MA CORONARY ARTERY BYP W/VEIN & ARTERY GRAFT 4 VEIN N/A 3/28/2018    Procedure: CORONARY ARTERY BYPASS GRAFT (CABG) x3 VESSELS, LIMA TO LAD, SVG--> PDA, SVG--> OM2,  RIGHT LEG EVH/SVH TO , INTRA-OP AMANDEEP;  Surgeon: Rocky Jenkins MD;  Location: [facility];  Service: Cardiac Surgery    ROTATOR CUFF REPAIR Bilateral      Social History   Social History     Substance and Sexual Activity   Alcohol Use Never    Comment: rarely     Social History     Substance and Sexual Activity   Drug Use No     Social History     Tobacco Use   Smoking Status Former    Current packs/day: 0.00    Average packs/day: 1 pack/day for 12.0 years (12.0 ttl pk-yrs)    Types: Cigarettes    Start date: 3/23/1982    Quit date:  3/23/1994    Years since quittin.2   Smokeless Tobacco Never     Family History   Problem Relation Age of Onset    Atrial fibrillation Mother     Stroke Mother     Hypertension Father     Heart attack Paternal Grandfather 59    Diabetes Maternal Grandmother     Diabetes Maternal Grandfather     Diabetes Maternal Aunt     Diabetes Maternal Uncle        Meds/Allergies     Not in a hospital admission.    No Known Allergies    Objective     There were no vitals taken for this visit.      PHYSICAL EXAMINATION:    General Appearance:   Alert, cooperative, no distress   HEENT:  Normocephalic, atraumatic, anicteric. Neck supple, symmetrical, trachea midline.   Lungs:   Equal chest rise and unlabored breathing, normal effort, no coughing.   Cardiovascular:   No visualized JVD.   Abdomen:   No abdominal distension.   Skin:   No jaundice, rashes, or lesions.    Musculoskeletal:   Normal range of motion visualized.   Psych:  Normal affect and normal insight.   Neuro:  Alert and appropriate.           ASSESSMENT/PLAN:  This is a 63 y.o. year old male here for colonoscopy, and he is stable and optimized for his procedure.

## 2024-06-04 NOTE — ANESTHESIA PREPROCEDURE EVALUATION
Procedure:  COLONOSCOPY    Relevant Problems   CARDIO   (+) CAD (coronary artery disease)   (+) Embolism and thrombosis of arteries of the lower extremities ([facility])   (+) Hyperlipidemia   (+) Hypertensive heart disease with congestive heart failure ([facility])   (+) PAD (peripheral artery disease) ([facility])   (+) Triple vessel coronary artery disease   (+) Venous stasis dermatitis of right lower extremity      ENDO   (+) Type 2 diabetes mellitus with diabetic neuropathy, with long-term current use of insulin ([facility])   (+) 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      GI/HEPATIC   (+) Gastroesophageal reflux disease without esophagitis      /RENAL   (+) Benign prostatic hyperplasia with urinary retention      HEMATOLOGY   (+) Anemia      NEURO/PSYCH   (+) Anxiety and depression   (+) Diabetic autonomic neuropathy associated with type 2 diabetes mellitus ([facility])   (+) Diabetic gastroparesis  ([facility])      PULMONARY   (+) Obstructive sleep apnea      Neurology/Sleep   (+) Phantom limb syndrome without pain ([facility])      Care Coordination   (+) S/P BKA (below knee amputation), left ([facility])      Surgery/Wound/Pain   (+) Diabetic ulcer of right heel ([facility])   (+) S/P CABG x 3      Orthopedic/Musculoskeletal   (+) S/P AKA (above knee amputation), right (HCC)      Cardiovascular/Peripheral Vascular   (+) Chronic heart failure with preserved ejection fraction ([facility])   (+) Orthostatic hypotension      Dermatology   (+) Skin abnormality        Physical Exam    Airway    Mallampati score: II  TM Distance: <3 FB  Neck ROM: full     Dental        Cardiovascular  Cardiovascular exam normal    Pulmonary  Pulmonary exam normal     Other Findings        Anesthesia Plan  ASA Score- 3     Anesthesia Type- IV sedation with anesthesia with ASA Monitors.         Additional Monitors:     Airway Plan:     Comment: [facility]  [address] [facility] [address]  [phone]  Name: Abner  "Fernando                       : 1960  MRN: [id number]                       Age: 63 y.o.  Patient Status: Inpatient          Gender: male  Echo complete w/ contrast    Height: 6' 1\" (1.854 m)  Weight: 113 kg (250 lb)  BSA: 2.36 m²  Blood Pressure: 111/67     Date of Study: 10/22/23  Ordering Provider: Tato Bar MD  Clinical Indications: CAD     Reading Physicians  Performing Staff  Cardiology: Andrés Queen DO     Tech: Nancy Seay, RS      Vitals    Height Weight BSA (Calculated - m2) BP Pulse  6' 1\" (1.854 m) 113 kg (250 lb) 2.37 sq meters 111/67 70    PACS Images     Show images for Echo complete w/ contrast if indicated  Study Details    This transthoracic echocardiogram was performed at bedside. This was a routine, inpatient study. Study quality was adequate. A complete 2D, color flow Doppler, spectral Doppler, 2D, color flow Doppler and spectral Doppler transthoracic echocardiogram was performed.  The apical, parasternal, subcostal and suprasternal views were obtained. 0.4 ml of Definity ultrasound enhancing agent was used.    History    DM;Obesity;HTN;CHF;HLD;CABG.    Interpretation Summary       ·  Left Ventricle: Left ventricular cavity size is normal. Wall thickness is moderately increased. The left ventricular ejection fraction is 61%. Systolic function is normal. Wall motion cannot be accurately assessed. Diastolic function is mildly abnormal, consistent with grade I (abnormal) relaxation.  ·  Right Ventricle: Systolic function is mildly reduced.     Findings    Left Ventricle Left ventricular cavity size is normal. Wall thickness is moderately increased. The left ventricular ejection fraction is 61%. Systolic function is normal.  Wall motion cannot be accurately assessed. Diastolic function is mildly abnormal, consistent with grade I (abnormal) relaxation.  Right Ventricle Right ventricular cavity size is normal. Systolic function is mildly reduced. Wall thickness is normal.  Left " Atrium The atrium is normal in size.  Right Atrium The atrium is normal in size.  Aortic Valve The aortic valve is trileaflet. The leaflets are not thickened. The leaflets are not calcified. The leaflets exhibit normal mobility. There is no evidence of regurgitation. The aortic valve has no significant stenosis.  Mitral Valve There is no evidence of regurgitation. There is no evidence of stenosis. The mitral valve has normal structure and normal function.  Tricuspid Valve The tricuspid valve was not well visualized. There is no evidence of regurgitation. There is no evidence of stenosis.  Pulmonic Valve The pulmonic valve was not well visualized. There is no evidence of regurgitation. There is no evidence of stenosis.  Ascending Aorta The aortic root is normal in size.  IVC/SVC The inferior vena cava is not well visualized.  Pericardium Pericardium was not well visualized.    Left Ventricle Measurements    Function/Volumes  A4C EF   61 %      Left ventricular stroke volume (2D)   42 mL      Dimensions  LVIDd   3.8 cm      LVIDS   2.5 cm      IVSd   1.4 cm      LVPWd   1.4 cm      FS   34      Diastolic Filling  MV E' Tissue Velocity Septal   4 cm/s      LA Volume Index (BP)   14.3 mL/m2      E/A ratio   0.64      E wave deceleration time   239 ms      MV Peak E Rocky   35 cm/s      MV Peak A Rocky   0.55 m/s           Right Ventricle Measurements    Dimensions  RVID d   4.3 cm      Tricuspid annular plane systolic excursion   1.6 cm           Left Atrium Measurements    Dimensions  LA size   1.6 cm      LA length (A2C)   4.8 cm      CLIFFORD A4C   14.6 cm2      Volumes  LA volume (BP)   34 mL      LA Volume Index (BP)   14.3 mL/m2           Right Atrium Measurements    Dimensions  RAA A4C   15.7 cm2           Mitral Valve Measurements    Stenosis  MV stenosis pressure 1/2 time   69 ms      MV valve area p 1/2 method   3.19           Aorta Measurements    Aortic Dimensions  Ao root   3.2 cm           Exam Details    Performed  Procedure Technologist Supporting Staff Performing Physician  Echo complete w/ contrast ALBIN Jackson         Appointment Date/Status Modality Department   10/22/2023     Completed [facility] ECHO 2 [facility] CAR NON INV        Begin Exam End Exam  End Exam Questionnaires  10/22/2023  2:30 PM 10/22/2023  3:48 PM  PATIENT EDUCATION         .       Plan Factors-Exercise tolerance (METS): <4 METS.    Chart reviewed. EKG reviewed. Imaging results reviewed. Existing labs reviewed. Patient summary reviewed.    Patient is not a current smoker. Patient not instructed to abstain from smoking on day of procedure. Patient did not smoke on day of surgery.            Induction- intravenous.    Postoperative Plan-         Informed Consent- Anesthetic plan and risks discussed with patient.  I personally reviewed this patient with the CRNA. Discussed and agreed on the Anesthesia Plan with the CRNA..

## 2024-06-04 NOTE — TELEPHONE ENCOUNTER
Spoke to Shreya nurse on duty see if the resident is going for his procedure -she stated he left the facility at 6:00am

## 2024-06-05 ENCOUNTER — NURSING HOME VISIT (OUTPATIENT)
Dept: GERIATRICS | Facility: OTHER | Age: 64
End: 2024-06-05
Payer: COMMERCIAL

## 2024-06-05 DIAGNOSIS — Z89.512 S/P BKA (BELOW KNEE AMPUTATION), LEFT (HCC): ICD-10-CM

## 2024-06-05 DIAGNOSIS — I50.32 CHRONIC HEART FAILURE WITH PRESERVED EJECTION FRACTION (HCC): ICD-10-CM

## 2024-06-05 DIAGNOSIS — Z89.611 S/P AKA (ABOVE KNEE AMPUTATION), RIGHT (HCC): ICD-10-CM

## 2024-06-05 DIAGNOSIS — I95.1 ORTHOSTATIC HYPOTENSION: Primary | ICD-10-CM

## 2024-06-05 PROCEDURE — 99308 SBSQ NF CARE LOW MDM 20: CPT

## 2024-06-07 ENCOUNTER — NURSING HOME VISIT (OUTPATIENT)
Dept: GERIATRICS | Facility: OTHER | Age: 64
End: 2024-06-07
Payer: COMMERCIAL

## 2024-06-07 DIAGNOSIS — K21.9 GASTROESOPHAGEAL REFLUX DISEASE WITHOUT ESOPHAGITIS: ICD-10-CM

## 2024-06-07 DIAGNOSIS — I73.9 PAD (PERIPHERAL ARTERY DISEASE) (HCC): ICD-10-CM

## 2024-06-07 DIAGNOSIS — E11.40 TYPE 2 DIABETES MELLITUS WITH DIABETIC NEUROPATHY, WITH LONG-TERM CURRENT USE OF INSULIN (HCC): Primary | ICD-10-CM

## 2024-06-07 DIAGNOSIS — I25.10 CORONARY ARTERY DISEASE INVOLVING NATIVE CORONARY ARTERY OF NATIVE HEART WITHOUT ANGINA PECTORIS: ICD-10-CM

## 2024-06-07 DIAGNOSIS — Z79.4 TYPE 2 DIABETES MELLITUS WITH DIABETIC NEUROPATHY, WITH LONG-TERM CURRENT USE OF INSULIN (HCC): Primary | ICD-10-CM

## 2024-06-07 DIAGNOSIS — R19.5 LOOSE STOOLS: ICD-10-CM

## 2024-06-07 DIAGNOSIS — I95.1 ORTHOSTATIC HYPOTENSION: ICD-10-CM

## 2024-06-07 DIAGNOSIS — R26.2 AMBULATORY DYSFUNCTION: ICD-10-CM

## 2024-06-07 PROCEDURE — 88305 TISSUE EXAM BY PATHOLOGIST: CPT | Performed by: STUDENT IN AN ORGANIZED HEALTH CARE EDUCATION/TRAINING PROGRAM

## 2024-06-07 PROCEDURE — 99309 SBSQ NF CARE MODERATE MDM 30: CPT

## 2024-06-12 ENCOUNTER — TELEPHONE (OUTPATIENT)
Age: 64
End: 2024-06-12

## 2024-06-12 NOTE — TELEPHONE ENCOUNTER
Patient returning phone call for tissue exam results from 6/4/24. Per result note, voicemail was left with results but patient states there was no message.

## 2024-06-17 VITALS
HEART RATE: 74 BPM | OXYGEN SATURATION: 96 % | WEIGHT: 198.5 LBS | TEMPERATURE: 97.5 F | DIASTOLIC BLOOD PRESSURE: 64 MMHG | BODY MASS INDEX: 26.19 KG/M2 | RESPIRATION RATE: 18 BRPM | SYSTOLIC BLOOD PRESSURE: 118 MMHG

## 2024-06-17 VITALS
HEART RATE: 84 BPM | OXYGEN SATURATION: 98 % | SYSTOLIC BLOOD PRESSURE: 156 MMHG | DIASTOLIC BLOOD PRESSURE: 57 MMHG | BODY MASS INDEX: 26.19 KG/M2 | WEIGHT: 198.5 LBS

## 2024-06-17 NOTE — ASSESSMENT & PLAN NOTE
Hemoglobin A1c (02/01/24)  6.4  Continue insulin lantus 38 units SQ BID  Will consider weaning as blood sugars have been well controlled and to avoid hypoglycemic episodes  Blood sugars reviewed from facility records  Continue to monitor Q6 months  Encourage CCD

## 2024-06-17 NOTE — ASSESSMENT & PLAN NOTE
Recent orthostatic hypotension episode while working with therapy this past week  At time of episode experienced symptoms of lightheadedness, diaphoretic when standing  Previously on midodrine  May need to consider restarting while restarting physical therapy  Continue to monitor  Educate on slow transitions from laying, sitting to standing

## 2024-06-17 NOTE — PROGRESS NOTES
[facility]  [address] [place] [address], [address], [place] [address]  [phone]    NAME: Abner King  AGE: 63 y.o. SEX: male    Progress Note    Location: [facility]  POS: 32 ([facility])  Code Status: DNR    Chief complaint / Reason for visit:  Follow up Visit    Assessment/Plan:    PAD (peripheral artery disease) ([facility])  S/p right AKA on 10/25/23  S/p left AKA on 08/03/2018  Continue aspirin 81 mg po daily  Continue atorvastatin 80 mg po daily  Continue clopidogrel 75 mg po daily    Orthostatic hypotension  Recent orthostatic hypotension episode while working with therapy this past week  At time of episode experienced symptoms of lightheadedness, diaphoretic when standing  Previously on midodrine  May need to consider restarting while restarting physical therapy  Continue to monitor  Educate on slow transitions from laying, sitting to standing    CAD (coronary artery disease)  No acute cardiac complaints  Continue aspirin, plavix, statin    Gastroesophageal reflux disease without esophagitis  Trial of discontinuation off protonix unsuccessful   Resident ultimately requested medication to be added back on  Continue PPI   Encourage out of bed for meals, remain upright for at least 30 minutes following meals  Avoid trigger foods  Follow up with GI    Type 2 diabetes mellitus with diabetic neuropathy, with long-term current use of insulin ([facility])  Hemoglobin A1c (02/01/24)  6.4  Continue insulin lantus 38 units SQ BID  Will consider weaning as blood sugars have been well controlled and to avoid hypoglycemic episodes  Blood sugars reviewed from facility records  Continue to monitor Q6 months  Encourage CCD    Ambulatory dysfunction  Secondary to bilateral lower extremity BKA  Restarted working with physical and occupational therapy as he is now fitted and has available bilateral lower extremity prosthetics   His goal is to be able to return to [facility] for more intense physical therapy and then  possibly be able to return home  Continue to encourage OOB  Continue to encourage frequent repositioning     Loose stools  Continues to have loose stools   Believes to have started since having covid in December 2023  Followed with GI with colonoscopy on 06/04  Found polyp with biopsy done  Awaiting results  Continue linzess, florastor and metamucil  Denies N/V  Continue to monitor  Encourage po fluid intake      This is a 63 y.o. male seen today at [facility].  Medical history includes, not limited to, s/p right AKA, history of left BKA, type 2 DM, peripheral vascular disease, gastroparesis, CHF, hyperlipidemia, GERD, anxiety, vitamin deficiency, and atherosclerotic heart disease.      Resident is seen today for a routine follow up visit.  Discussed his recent colonoscopy.  He states the prep was not terrible.  He is awaiting results from the biopsy taken. He has been working well with therapy since receiving his right lower extremity prosthetic.  He states his goals are to be able to return home after an intense therapy stay at [facility].  He is hoping for the approval.      Reviewed resident with nursing staff. Reviewed recent labs, vitals, and orders.         Nursing and prior provider notes reviewed on this visit. Discussed visit with PCP and nursing staff/ supervisor.      Review of Systems   Constitutional:  Positive for activity change and appetite change. Negative for fever.   HENT:  Negative for congestion and rhinorrhea.    Eyes:  Negative for pain and visual disturbance.   Respiratory:  Negative for cough and shortness of breath.    Cardiovascular:  Negative for chest pain and leg swelling.   Gastrointestinal:  Positive for diarrhea. Negative for abdominal pain, constipation, nausea and vomiting.   Genitourinary:  Negative for difficulty urinating and dysuria.   Musculoskeletal:  Positive for gait problem (bilateral BKA). Negative for arthralgias.   Skin:  Positive for wound. Negative for  color change and rash.   Neurological:  Negative for dizziness, syncope and weakness.   Psychiatric/Behavioral:  Negative for behavioral problems and confusion.    All other systems reviewed and are negative.         ALLERGY: Reviewed, unchanged  No Known Allergies    HISTORY:  Medical Hx: Reviewed, unchanged  Family Hx: Reviewed, unchanged  Soc Hx: Reviewed,  unchanged      Physical Exam  Vitals reviewed.   Constitutional:       General: He is not in acute distress.     Appearance: Normal appearance. He is not ill-appearing.   HENT:      Head: Normocephalic.      Right Ear: Tympanic membrane normal.      Left Ear: Tympanic membrane normal.      Nose: Nose normal. No congestion.      Mouth/Throat:      Mouth: Mucous membranes are moist.      Pharynx: Oropharynx is clear.   Eyes:      General:         Right eye: No discharge.         Left eye: No discharge.      Extraocular Movements: Extraocular movements intact.      Conjunctiva/sclera: Conjunctivae normal.      Pupils: Pupils are equal, round, and reactive to light.   Cardiovascular:      Rate and Rhythm: Normal rate and regular rhythm.      Pulses: Normal pulses.      Heart sounds: Normal heart sounds.   Pulmonary:      Effort: Pulmonary effort is normal. No respiratory distress.      Breath sounds: Normal breath sounds.   Chest:      Chest wall: No tenderness.   Abdominal:      General: Bowel sounds are normal.      Palpations: Abdomen is soft.      Tenderness: There is no abdominal tenderness.   Musculoskeletal:         General: Deformity present. No swelling. Normal range of motion.      Cervical back: Normal range of motion.      Right lower leg: No edema.      Left lower leg: No edema.   Skin:     General: Skin is warm and dry.   Neurological:      Mental Status: He is alert and oriented to person, place, and time. Mental status is at baseline.      Motor: No weakness.   Psychiatric:         Mood and Affect: Mood normal.         Behavior: Behavior normal.     "     Thought Content: Thought content normal.         Judgment: Judgment normal.            Laboratory results / Imaging reviewed: Hard copy/ies in medical chart:    Vitals:    06/17/24 1452   BP: 118/64   Pulse: 74   Resp: 18   Temp: 97.5 °F (36.4 °C)   SpO2: 96%       Current Medications:  All medications reviewed and updated in Nursing Home Chart    Please note: This note was completed in part utilizing a voice-recognition software may have been used in the preparation of this document. Grammatical errors, random word insertion, spelling mistakes, and incomplete sentences may be an occasional consequence of the system secondary to software limitations, ambient noise and hardware issues. Occasional wrong word or \"sound-alike\" substitutions may have occurred due to the inherent limitations of voice recognition software. At the time of dictation, efforts were made to edit, clarify and/or correct errors. Interpretation should be guided by context. Please read the chart carefully and recognize, using context, where substitutions have occurred. If you have any questions or concerns about the context, text or information contained within the body of this dictation, please contact myself, the provider, for further clarification.      CARLTON Givens  6/17/2024  "

## 2024-06-17 NOTE — ASSESSMENT & PLAN NOTE
Wt Readings from Last 3 Encounters:   06/05/24 90 kg (198 lb 8 oz)   05/20/24 90.4 kg (199 lb 6.4 oz)   02/27/24 93.2 kg (205 lb 8 oz)     Euvolemic   No shortness of breath  Lungs clears  2D echocardiogram 10/22: left ventricular ejection fraction 61%. Grade 1 diastolic dysfunction  Continue furosemide 40 mg po BID

## 2024-06-17 NOTE — ASSESSMENT & PLAN NOTE
Continues to have loose stools   Believes to have started since having covid in December 2023  Followed with GI with colonoscopy on 06/04  Found polyp with biopsy done  Awaiting results  Continue linzess, florastor and metamucil  Denies N/V  Continue to monitor  Encourage po fluid intake

## 2024-06-17 NOTE — ASSESSMENT & PLAN NOTE
Orthostatic hypotension while working with therapy  156/67, 84 while sitting  109/65, 95 while standing  134/65, 87 recover blood pressure when sitting  Symptoms of lightheadedness, diaphoretic when standing  Previously on midodrine  May need to consider restarting while restarting physical therapy  Continue to monitor  Educate on slow transitions from laying, sitting to standing

## 2024-06-17 NOTE — ASSESSMENT & PLAN NOTE
Received right lower prosthetic from [facility]  Is following with [facility]  Starting PT/OT at [facility], may be able to transition to [facility] if progresses well with new prosthetic   During therapy experienced episode of orthostatic hypotension  Continue to monitor  Monitor site for any breakdown in skin  PT/OT as appropriate

## 2024-06-17 NOTE — ASSESSMENT & PLAN NOTE
Trial of discontinuation off protonix unsuccessful   Resident ultimately requested medication to be added back on  Continue PPI   Encourage out of bed for meals, remain upright for at least 30 minutes following meals  Avoid trigger foods  Follow up with GI

## 2024-06-17 NOTE — PROGRESS NOTES
[address], [address], [place] [address]  [phone]    NAME: Abner King  AGE: 63 y.o. SEX: male    Progress Note    Location: [facility]  POS: 32 ([facility])  Code Status: DNR    Chief complaint / Reason for visit: Acute Visit    Assessment/Plan:    Orthostatic hypotension  Orthostatic hypotension while working with therapy  156/67, 84 while sitting  109/65, 95 while standing  134/65, 87 recover blood pressure when sitting  Symptoms of lightheadedness, diaphoretic when standing  Previously on midodrine  May need to consider restarting while restarting physical therapy  Continue to monitor  Educate on slow transitions from laying, sitting to standing    Chronic heart failure with preserved ejection fraction (HCC)  Wt Readings from Last 3 Encounters:   06/05/24 90 kg (198 lb 8 oz)   05/20/24 90.4 kg (199 lb 6.4 oz)   02/27/24 93.2 kg (205 lb 8 oz)     Euvolemic   No shortness of breath  Lungs clears  2D echocardiogram 10/22: left ventricular ejection fraction 61%. Grade 1 diastolic dysfunction  Continue furosemide 40 mg po BID    S/P AKA (above knee amputation), right ([facility])  Received right lower prosthetic from [facility] [employer]  Is following with [facility]  Starting PT/OT at [facility], may be able to transition to [facility] if progresses well with new prosthetic   During therapy experienced episode of orthostatic hypotension  Continue to monitor  Monitor site for any breakdown in skin  PT/OT as appropriate    S/P BKA (below knee amputation), left ([facility])  Uses bilateral lower extremity prosthetics  Monitor for skin breakdown  PT/OT        This is a 63 y.o. male seen today at [facility].  Medical history includes, not limited to, s/p right AKA, history of left BKA, type 2 DM, peripheral vascular disease, gastroparesis, CHF, hyperlipidemia, GERD, anxiety, vitamin deficiency, and atherosclerotic heart disease.      Resident is seen today for an acute visit.   While working with therapy today resident with lightheadedness and diaphoresis.  He states that in the past his blood pressure would sometimes drop while working with therapy and standing.  States that he used to be on midodrine.      Blood pressure and blood sugar monitored at time of occurrence.  Blood pressure does drop while standing.  Recovers when back in seated position.          Reviewed resident with nursing staff.         Nursing and prior provider notes reviewed on this visit. Discussed visit with PCP and nursing staff/ supervisor.      Review of Systems   Constitutional:  Positive for activity change. Negative for fever.   HENT:  Negative for congestion and rhinorrhea.    Eyes:  Negative for pain and visual disturbance.   Respiratory:  Negative for cough and shortness of breath.    Cardiovascular:  Negative for chest pain and leg swelling.   Gastrointestinal:  Negative for abdominal pain, constipation, nausea and vomiting.        Reports of loose bowels   Genitourinary:  Negative for difficulty urinating and dysuria.   Musculoskeletal:  Positive for gait problem (bilateral BKA). Negative for arthralgias.   Skin:  Positive for wound. Negative for color change and rash.   Neurological:  Negative for dizziness, syncope and weakness.   Psychiatric/Behavioral:  Negative for behavioral problems and confusion.    All other systems reviewed and are negative.         ALLERGY: Reviewed, unchanged  No Known Allergies    HISTORY:  Medical Hx: Reviewed, unchanged  Family Hx: Reviewed, unchanged  Soc Hx: Reviewed,  unchanged      Physical Exam  Vitals reviewed.   Constitutional:       General: He is not in acute distress.     Appearance: Normal appearance. He is not ill-appearing.   HENT:      Head: Normocephalic.      Right Ear: Tympanic membrane normal.      Left Ear: Tympanic membrane normal.      Nose: Nose normal. No congestion.      Mouth/Throat:      Mouth: Mucous membranes are moist.      Pharynx: Oropharynx  "is clear.   Eyes:      General:         Right eye: No discharge.         Left eye: No discharge.      Extraocular Movements: Extraocular movements intact.      Conjunctiva/sclera: Conjunctivae normal.      Pupils: Pupils are equal, round, and reactive to light.   Cardiovascular:      Rate and Rhythm: Normal rate and regular rhythm.      Pulses: Normal pulses.      Heart sounds: Normal heart sounds.   Pulmonary:      Effort: Pulmonary effort is normal. No respiratory distress.      Breath sounds: Normal breath sounds.   Chest:      Chest wall: No tenderness.   Abdominal:      General: Bowel sounds are normal.      Palpations: Abdomen is soft.      Tenderness: There is no abdominal tenderness.   Musculoskeletal:         General: Deformity present. No swelling. Normal range of motion.      Cervical back: Normal range of motion.   Skin:     General: Skin is warm and dry.   Neurological:      Mental Status: He is alert and oriented to person, place, and time. Mental status is at baseline.      Motor: No weakness.   Psychiatric:         Mood and Affect: Mood normal.         Behavior: Behavior normal.         Thought Content: Thought content normal.         Judgment: Judgment normal.            Laboratory results / Imaging reviewed: Hard copy/ies in medical chart:    BP: 156/67, Pulse: 84, O2: 98%, B    Current Medications:  All medications reviewed and updated in Nursing Home Chart    Please note: This note was completed in part utilizing a voice-recognition software may have been used in the preparation of this document. Grammatical errors, random word insertion, spelling mistakes, and incomplete sentences may be an occasional consequence of the system secondary to software limitations, ambient noise and hardware issues. Occasional wrong word or \"sound-alike\" substitutions may have occurred due to the inherent limitations of voice recognition software. At the time of dictation, efforts were made to edit, clarify " and/or correct errors. Interpretation should be guided by context. Please read the chart carefully and recognize, using context, where substitutions have occurred. If you have any questions or concerns about the context, text or information contained within the body of this dictation, please contact myself, the provider, for further clarification.      CARLTON Givens  6/17/2024

## 2024-06-17 NOTE — ASSESSMENT & PLAN NOTE
S/p right AKA on 10/25/23  S/p left AKA on 08/03/2018  Continue aspirin 81 mg po daily  Continue atorvastatin 80 mg po daily  Continue clopidogrel 75 mg po daily

## 2024-06-17 NOTE — ASSESSMENT & PLAN NOTE
Secondary to bilateral lower extremity JILLIAN  Restarted working with physical and occupational therapy as he is now fitted and has available bilateral lower extremity prosthetics   His goal is to be able to return to [facility] for more intense physical therapy and then possibly be able to return home  Continue to encourage OOB  Continue to encourage frequent repositioning

## 2024-07-10 ENCOUNTER — NURSING HOME VISIT (OUTPATIENT)
Dept: GERIATRICS | Facility: OTHER | Age: 64
End: 2024-07-10
Payer: COMMERCIAL

## 2024-07-10 DIAGNOSIS — E78.2 MIXED HYPERLIPIDEMIA: ICD-10-CM

## 2024-07-10 DIAGNOSIS — E11.43 DIABETIC GASTROPARESIS  (HCC): Chronic | ICD-10-CM

## 2024-07-10 DIAGNOSIS — Z86.16 HISTORY OF COVID-19: ICD-10-CM

## 2024-07-10 DIAGNOSIS — K21.9 GASTROESOPHAGEAL REFLUX DISEASE WITHOUT ESOPHAGITIS: ICD-10-CM

## 2024-07-10 DIAGNOSIS — Z89.611 S/P AKA (ABOVE KNEE AMPUTATION), RIGHT (HCC): ICD-10-CM

## 2024-07-10 DIAGNOSIS — F41.9 ANXIETY AND DEPRESSION: ICD-10-CM

## 2024-07-10 DIAGNOSIS — I25.10 CORONARY ARTERY DISEASE INVOLVING NATIVE CORONARY ARTERY OF NATIVE HEART WITHOUT ANGINA PECTORIS: ICD-10-CM

## 2024-07-10 DIAGNOSIS — Z79.4 TYPE 2 DIABETES MELLITUS WITH DIABETIC NEUROPATHY, WITH LONG-TERM CURRENT USE OF INSULIN (HCC): ICD-10-CM

## 2024-07-10 DIAGNOSIS — Z89.512 S/P BKA (BELOW KNEE AMPUTATION), LEFT (HCC): ICD-10-CM

## 2024-07-10 DIAGNOSIS — K31.84 DIABETIC GASTROPARESIS  (HCC): Chronic | ICD-10-CM

## 2024-07-10 DIAGNOSIS — R33.8 BENIGN PROSTATIC HYPERPLASIA WITH URINARY RETENTION: ICD-10-CM

## 2024-07-10 DIAGNOSIS — N40.1 BENIGN PROSTATIC HYPERPLASIA WITH URINARY RETENTION: ICD-10-CM

## 2024-07-10 DIAGNOSIS — I50.32 CHRONIC HEART FAILURE WITH PRESERVED EJECTION FRACTION (HCC): ICD-10-CM

## 2024-07-10 DIAGNOSIS — F32.A ANXIETY AND DEPRESSION: ICD-10-CM

## 2024-07-10 DIAGNOSIS — R19.5 LOOSE STOOLS: ICD-10-CM

## 2024-07-10 DIAGNOSIS — E11.40 TYPE 2 DIABETES MELLITUS WITH DIABETIC NEUROPATHY, WITH LONG-TERM CURRENT USE OF INSULIN (HCC): ICD-10-CM

## 2024-07-10 DIAGNOSIS — I73.9 PAD (PERIPHERAL ARTERY DISEASE) (HCC): Primary | ICD-10-CM

## 2024-07-10 PROCEDURE — 99309 SBSQ NF CARE MODERATE MDM 30: CPT | Performed by: STUDENT IN AN ORGANIZED HEALTH CARE EDUCATION/TRAINING PROGRAM

## 2024-07-10 NOTE — PROGRESS NOTES
[facility]  Facility: [facility]    PROGRESS NOTE  Nursing Home Place of Service: nursing home place of service: POS 32 Unskilled- No Part A Coverage    NAME: Abner King  : 1960 AGE: 64 y.o. SEX: male MRN: [id number]  DATE OF ENCOUNTER: 7/10/2024    Assessment and Plan     Problem List Items Addressed This Visit       Anxiety and depression     Mood stable  Stressors include health complications, amputations which he is getting used to  Used to be on alprazolam PRN, has been weaned off as of 2024  Continue supportive care  Monitor for change in mood/behaviors          Type 2 diabetes mellitus with diabetic neuropathy, with long-term current use of insulin ([facility])       Lab Results   Component Value Date    HGBA1C 6.7 (A) 2023       A1c 6.4 as of 24  Monitor glucose - generally well controlled in low-mid 100s  Avoid hypoglycemia  Continue lantus, 38u BID with hold parameters, consider weaning slightly if sugars remain well controlled or become lower  Encourage CCD  Continue to monitor, check A1c periodically         Hyperlipidemia     Continue statin  Monitor lipids periodically - appear well controlled         PAD (peripheral artery disease) ([facility]) - Primary     S/p right AKA on 10/25/23  Continue aspirin, statin, plavix         S/P BKA (below knee amputation), left ([facility])     Uses prosthetic  Monitor for skin breakdown  PT/OT         Diabetic gastroparesis  ([facility]) (Chronic)       Lab Results   Component Value Date    HGBA1C 6.7 (A) 2023     Hx of diabetic gastroparesis with frequent constipation in the past however recently diarrhea has been his main concern  See plan under Diarrhea         Chronic heart failure with preserved ejection fraction (HCC)     Wt Readings from Last 3 Encounters:   24 90 kg (198 lb 8 oz)   24 90 kg (198 lb 8 oz)   24 90.4 kg (199 lb 6.4 oz)       2D echocardiogram 10/22: left ventricular ejection fraction 61%. Grade 1  "diastolic dysfunction   Monitor routine weight - fairly stable recently, no alarming uptrend  Monitor volume status clinically - appears euvolemic, no orthopnea  Continue lasix PO 40mg BID             CAD (coronary artery disease)     No acute cardiac complaints, seems stable  Continue aspirin, plavix, statin         Loose stools     Seems to be having intermittent loose stools since he had COVID in Dec 2023  Patient feels diarrhea is stable over time, and overall improved  Denies associated GI symptoms including abdominal pain, nausea, vomiting. He does pass gas. He reports good appetite.  Recently having more formed/soft stools, not watery, BM typically every 2 days so not very frequent  Stool occult blood has been negative  Stool PCR panel has been negative  He was on reglan for gastroparesis, this has been stopped  Banana flakes were tried and reportedly ineffective  Pepto bismol was tried and reportedly ineffective  Continues on probiotic and metamucil though does not feel these have changed the diarrhea significantly  XR abdomen 3/6/24 with presence of stool but \"There is no bowel obstruction\"  Could consider further abdominal imaging however presently no acute abdominal exam findings  Patient does report hx of lactose intolerance but states he is careful and takes mainly lactose free products  Recent TSH and tTG-IgA WNL  He was evaluated by GI in office 4/18/24  Started on linzess, does not feel this has made a notable difference but possibly helpful a bit  Colonoscopy done recently 6/4/24, see note  Monitor BMP routinely to monitor electrolytes - recently seems stable  Continue to monitor  Encourage PO intake/hydration  Follow up with GI as appropriate         S/P AKA (above knee amputation), right (HCC)     Cleared by vascular surgery for weight bearing status  Has prosthetic  Monitor site for any breakdown in skin  PT/OT as appropriate         Gastroesophageal reflux disease without esophagitis     " -stable  -continue PPI (did not tolerate recent attempt at discontinuing pantoprazole due to recurrence of symptoms)  -recommend OOB with meals, sit upright for at least 30 minutes afterwards, avoid trigger foods  -continue to monitor  -follow up with GI as appropriate         History of COVID-19     Patient had COVID in Dec 2023 with major symptoms including nausea, poor appetite, diarrhea; denied any notable associated respiratory symptoms  Was treated with paxlovid and supportive care  Feels he recovered fully from COVID except that he feels the diarrhea has been an ongoing concern since then, see plan under Diarrhea         Benign prostatic hyperplasia with urinary retention     Noted hx of BPH with hx of urinary retention per chart review  Continues on tamsulosin, finasteride  Appears stable  Continue to monitor                  Chief Complaint     Follow up 60 day    History of Present Illness     Abner King is a 64 y.o. male who was seen today for follow up. PMH including s/p right AKA (Oct 2023), history of left BKA, type 2 DM, peripheral vascular disease, gastroparesis, CHF, hyperlipidemia, GERD, anxiety, vitamin deficiency, and atherosclerotic heart disease     Patient seen and examined in room  Others present: none  Patient laying in bed, able to reposition independently  Appears comfortable, awake, alert, oriented to situation, able to converse appropriately  Patient Aox3, polite and in good spirits. Notes he has been doing well recently overall and feels happy. No acute changes since last visit. Feels his chronic diarrhea (issue seemed to start when he had COVID some time ago) is overall improved; stool typically soft but not watery like it used to be; last BM 2 days ago and states he normally goes 2-3 days between BM; denies any abdominal pain, N/V, blood in stool, or other acute GI symptoms; feels his groin irritation from diarrhea/frequent cleaning is improved compared to prior and no longer a  "notable concern. We discussed his recent colonoscopy and GI visit. Apart from the diarrhea concern he feels well; appetite good, no swallowing concerns, breathing fine, no orthopnea, no pain anywhere, no recent abdominal pain/nausea/vomiting. Urinating fine independently without acute symptoms. No acute cardiopulmonary, abdominal, or urinary symptoms; see ROS for more details. He is working with therapy regarding his prosthetics, reports this has been going well, and his ultimate goal is to be able to be discharged and ultimately return home.     No further questions or acute concerns identified.  No acute concerns per nursing or NP.    Subsequently observed patient to self-propel in hallway in wheelchair and take elevator independently.        XR KUB 3/6/24 \"There is no bowel obstruction\"  Colonoscopy 6/4/24:   \"Subcentimeter polyp in the ascending colon was removed with cold snare  Edematous mucosa in the sigmoid colon; performed cold forceps biopsy  Redundant folds in the sigmoid colon.  The cecum, hepatic flexure, transverse colon, splenic flexure, descending colon and rectum appeared normal.\"  Await pathology results  Repeat colonoscopy in 7 years, due: 6/3/2031  A. Polyp, Colorectal, ascending colon polyp/cold snare:      - Tubular adenoma      - No high-grade dysplasia and no evidence of malignancy    B. Large Intestine, Sigmoid Colon, sigmoid colon redundant fold 50cm bx:      - Colonic mucosa with focal surface hyperplastic change       - Negative for dysplasia or carcinoma \"       Lab Review:   Old TSH from 2020 was 4/712 (slightly high)  12/28: BMP with K 3.5, CBC with Hb 10.7  1/2: BMP with K 3.4, CBC with Hb 11  2/1: lipids WNL, A1c 6.4  2/5: stool PCR panel including C.diff negative  2/9: BMP generally stable/non-actionable, GFR >100  2/19: stool occult blood negative  3/13/24: TSH WNL; ttg iga antibody WNL        EKG 10/25/23: NSR, 78bpm, Qtc 483  Echo 10/22/23: EF 61, grade 1 diastolic dysfunction, " no notable valvular stenosis      The following portions of the patient's history were reviewed and updated as appropriate: allergies, current medications, past family history, past medical history, past social history, past surgical history and problem list.    Review of Systems     Review of Systems   Constitutional:  Negative for appetite change, chills, diaphoresis and fever.   HENT:  Negative for drooling, ear pain, hearing loss, rhinorrhea, sore throat and trouble swallowing.    Eyes:  Negative for pain, discharge, redness, itching and visual disturbance.   Respiratory:  Negative for cough, chest tightness, shortness of breath and wheezing.    Cardiovascular:  Negative for chest pain and palpitations.   Gastrointestinal:  Positive for diarrhea (stable/improved overall). Negative for abdominal pain, blood in stool, constipation, nausea and vomiting.   Genitourinary:  Negative for difficulty urinating, dysuria, flank pain and hematuria.   Musculoskeletal:  Positive for gait problem. Negative for arthralgias and back pain.   Skin:  Negative for color change.   Neurological:  Negative for dizziness, tremors, seizures, facial asymmetry, speech difficulty, weakness and headaches.   Psychiatric/Behavioral:  Negative for agitation, behavioral problems and confusion. The patient is not nervous/anxious and is not hyperactive.        Active Problem List     Patient Active Problem List   Diagnosis    Anxiety and depression    Type 2 diabetes mellitus with diabetic neuropathy, with long-term current use of insulin ([facility])    Hyperlipidemia    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    Vitamin D deficiency    PAD (peripheral artery disease) ([facility])    S/P BKA (below knee amputation), left ([facility])    Orthostatic hypotension    Diabetic gastroparesis  ([facility])    Class 2 severe obesity due to excess calories with serious comorbidity and body mass index (BMI) of 35.0 to 35.9 in adult ([facility])     Triple vessel coronary artery disease    Hypertensive heart disease with congestive heart failure (HCC)    Chronic heart failure with preserved ejection fraction (HCC)    S/P CABG x 3    Diabetic autonomic neuropathy associated with type 2 diabetes mellitus (HCC)    Hyponatremia    Obstructive sleep apnea    Phantom limb syndrome without pain (HCC)    CAD (coronary artery disease)    Elephantiasis [place]    Embolism and thrombosis of arteries of the lower extremities (HCC)    Lymphedema of right lower extremity    Venous stasis dermatitis of right lower extremity    Abdominal distension    Non-pressure chronic ulcer of right calf with fat layer exposed (HCC)    Cellulitis of right ankle    Lymphedema in adult patient    Diabetic ulcer of right heel (HCC)    Loose stools    Elevated troponin    Gangrene (HCC)    Anemia    S/P AKA (above knee amputation), right (HCC)    Chills    COVID-19    Advance care planning    Hypokalemia    Gastroesophageal reflux disease without esophagitis    Skin abnormality    History of COVID-19    Benign prostatic hyperplasia with urinary retention    Ambulatory dysfunction       Objective     Vital Signs:     BP: 126/67 mmHg  7/4/2024 12:02   Temp:97.7 °F  7/4/2024 12:02 Pulse:72 bpm  7/4/2024 12:02 Weight:208.2 Lbs  7/6/2024 17:54   Resp:18 Breaths/min  7/4/2024 12:02 BS:180 mg/dL  7/10/2024 16:49 O2:98 %  7/4/2024 12:02 Pain:0  7/10/2024 16:49       Physical Exam  Vitals reviewed.   Constitutional:       General: He is not in acute distress.     Appearance: He is not toxic-appearing or diaphoretic.   HENT:      Head: Normocephalic and atraumatic.      Right Ear: External ear normal.      Left Ear: External ear normal.      Nose: Nose normal. No rhinorrhea.      Mouth/Throat:      Mouth: Mucous membranes are moist.      Pharynx: Oropharynx is clear. No oropharyngeal exudate or posterior oropharyngeal erythema.   Eyes:      General: No scleral icterus.        Right eye: No  discharge.         Left eye: No discharge.      Extraocular Movements: Extraocular movements intact.      Conjunctiva/sclera: Conjunctivae normal.      Pupils: Pupils are equal, round, and reactive to light.   Cardiovascular:      Rate and Rhythm: Normal rate and regular rhythm.   Pulmonary:      Effort: Pulmonary effort is normal. No respiratory distress.      Breath sounds: No wheezing or rales.   Abdominal:      General: Bowel sounds are normal. There is no distension.      Palpations: Abdomen is soft.      Tenderness: There is no abdominal tenderness. There is no guarding.   Musculoskeletal:         General: No tenderness.      Cervical back: No rigidity.      Comments: S/p L BKA and R AKA, sites appear clean/dry/well healed   Skin:     General: Skin is warm and dry.      Coloration: Skin is not jaundiced.   Neurological:      General: No focal deficit present.      Mental Status: He is alert and oriented to person, place, and time. Mental status is at baseline.   Psychiatric:         Mood and Affect: Mood normal.         Behavior: Behavior normal.         Thought Content: Thought content normal.         Judgment: Judgment normal.         Pertinent Laboratory/Diagnostic Studies:  Laboratory and Imaging studies reviewed. Full report in the paper chart.     Current Medications   Medications reviewed and updated in facility chart.      -Total time spent on this encounter today including documentation and workup review, face to face time, history and exam, and documentation/orders was approximately 35 minutes.  -This note will be copied to [facility] EMR where vitals and medication orders are placed.    Arias Fox D.O.  Geriatric Medicine  7/11/2024 12:00 AM

## 2024-07-11 NOTE — ASSESSMENT & PLAN NOTE
-stable  -continue PPI (did not tolerate recent attempt at discontinuing pantoprazole due to recurrence of symptoms)  -recommend OOB with meals, sit upright for at least 30 minutes afterwards, avoid trigger foods  -continue to monitor  -follow up with GI as appropriate

## 2024-07-11 NOTE — ASSESSMENT & PLAN NOTE
Wt Readings from Last 3 Encounters:   06/17/24 90 kg (198 lb 8 oz)   06/05/24 90 kg (198 lb 8 oz)   05/20/24 90.4 kg (199 lb 6.4 oz)       2D echocardiogram 10/22: left ventricular ejection fraction 61%. Grade 1 diastolic dysfunction   Monitor routine weight - fairly stable recently, no alarming uptrend  Monitor volume status clinically - appears euvolemic, no orthopnea  Continue lasix PO 40mg BID

## 2024-07-11 NOTE — ASSESSMENT & PLAN NOTE
"Seems to be having intermittent loose stools since he had COVID in Dec 2023  Patient feels diarrhea is stable over time, and overall improved  Denies associated GI symptoms including abdominal pain, nausea, vomiting. He does pass gas. He reports good appetite.  Recently having more formed/soft stools, not watery, BM typically every 2 days so not very frequent  Stool occult blood has been negative  Stool PCR panel has been negative  He was on reglan for gastroparesis, this has been stopped  Banana flakes were tried and reportedly ineffective  Pepto bismol was tried and reportedly ineffective  Continues on probiotic and metamucil though does not feel these have changed the diarrhea significantly  XR abdomen 3/6/24 with presence of stool but \"There is no bowel obstruction\"  Could consider further abdominal imaging however presently no acute abdominal exam findings  Patient does report hx of lactose intolerance but states he is careful and takes mainly lactose free products  Recent TSH and tTG-IgA WNL  He was evaluated by GI in office 4/18/24  Started on linzess, does not feel this has made a notable difference but possibly helpful a bit  Colonoscopy done recently 6/4/24, see note  Monitor BMP routinely to monitor electrolytes - recently seems stable  Continue to monitor  Encourage PO intake/hydration  Follow up with GI as appropriate  "

## 2024-07-11 NOTE — ASSESSMENT & PLAN NOTE
Cleared by vascular surgery for weight bearing status  Has prosthetic  Monitor site for any breakdown in skin  PT/OT as appropriate

## 2024-07-11 NOTE — ASSESSMENT & PLAN NOTE
Lab Results   Component Value Date    HGBA1C 6.7 (A) 02/02/2023       A1c 6.4 as of 2/1/24  Monitor glucose - generally well controlled in low-mid 100s  Avoid hypoglycemia  Continue lantus, 38u BID with hold parameters, consider weaning slightly if sugars remain well controlled or become lower  Encourage CCD  Continue to monitor, check A1c periodically

## 2024-07-23 LAB
LEFT EYE DIABETIC RETINOPATHY: POSITIVE
RIGHT EYE DIABETIC RETINOPATHY: POSITIVE

## 2024-07-25 ENCOUNTER — NURSING HOME VISIT (OUTPATIENT)
Dept: GERIATRICS | Facility: OTHER | Age: 64
End: 2024-07-25
Payer: COMMERCIAL

## 2024-07-25 DIAGNOSIS — Z89.512 S/P BKA (BELOW KNEE AMPUTATION), LEFT (HCC): Primary | ICD-10-CM

## 2024-07-25 DIAGNOSIS — E11.40 TYPE 2 DIABETES MELLITUS WITH DIABETIC NEUROPATHY, WITH LONG-TERM CURRENT USE OF INSULIN (HCC): ICD-10-CM

## 2024-07-25 DIAGNOSIS — Z79.4 TYPE 2 DIABETES MELLITUS WITH DIABETIC NEUROPATHY, WITH LONG-TERM CURRENT USE OF INSULIN (HCC): ICD-10-CM

## 2024-07-25 PROCEDURE — 99308 SBSQ NF CARE LOW MDM 20: CPT

## 2024-08-13 ENCOUNTER — NURSING HOME VISIT (OUTPATIENT)
Dept: GERIATRICS | Facility: OTHER | Age: 64
End: 2024-08-13
Payer: COMMERCIAL

## 2024-08-13 DIAGNOSIS — K21.9 GASTROESOPHAGEAL REFLUX DISEASE WITHOUT ESOPHAGITIS: ICD-10-CM

## 2024-08-13 DIAGNOSIS — E11.40 TYPE 2 DIABETES MELLITUS WITH DIABETIC NEUROPATHY, WITH LONG-TERM CURRENT USE OF INSULIN (HCC): Primary | ICD-10-CM

## 2024-08-13 DIAGNOSIS — I73.9 PAD (PERIPHERAL ARTERY DISEASE) (HCC): ICD-10-CM

## 2024-08-13 DIAGNOSIS — Z89.512 S/P BKA (BELOW KNEE AMPUTATION), LEFT (HCC): ICD-10-CM

## 2024-08-13 DIAGNOSIS — R26.2 AMBULATORY DYSFUNCTION: ICD-10-CM

## 2024-08-13 DIAGNOSIS — Z89.611 S/P AKA (ABOVE KNEE AMPUTATION), RIGHT (HCC): ICD-10-CM

## 2024-08-13 DIAGNOSIS — Z79.4 TYPE 2 DIABETES MELLITUS WITH DIABETIC NEUROPATHY, WITH LONG-TERM CURRENT USE OF INSULIN (HCC): Primary | ICD-10-CM

## 2024-08-13 PROCEDURE — 99309 SBSQ NF CARE MODERATE MDM 30: CPT

## 2024-08-24 ENCOUNTER — TELEPHONE (OUTPATIENT)
Dept: OTHER | Facility: OTHER | Age: 64
End: 2024-08-24

## 2024-08-27 ENCOUNTER — NURSING HOME VISIT (OUTPATIENT)
Dept: GERIATRICS | Facility: OTHER | Age: 64
End: 2024-08-27
Payer: COMMERCIAL

## 2024-08-27 VITALS
TEMPERATURE: 97.9 F | BODY MASS INDEX: 26.57 KG/M2 | HEART RATE: 66 BPM | WEIGHT: 201.4 LBS | RESPIRATION RATE: 18 BRPM | DIASTOLIC BLOOD PRESSURE: 68 MMHG | SYSTOLIC BLOOD PRESSURE: 126 MMHG | OXYGEN SATURATION: 97 %

## 2024-08-27 VITALS — WEIGHT: 201.4 LBS | BODY MASS INDEX: 26.57 KG/M2

## 2024-08-27 DIAGNOSIS — R19.5 LOOSE STOOLS: ICD-10-CM

## 2024-08-27 DIAGNOSIS — U07.1 COVID-19: Primary | ICD-10-CM

## 2024-08-27 PROCEDURE — 99308 SBSQ NF CARE LOW MDM 20: CPT

## 2024-08-27 RX ORDER — BENZOCAINE/MENTHOL 6 MG-10 MG
1 LOZENGE MUCOUS MEMBRANE 4 TIMES DAILY PRN
COMMUNITY

## 2024-08-27 NOTE — ASSESSMENT & PLAN NOTE
Secondary to bilateral lower extremity JILLIAN  Restarted working with physical and occupational therapy as he is now fitted and has available bilateral lower extremity prosthetics   His goal is to be able to return to [facility] for more intense physical therapy and then possibly be able to return home  Continue to encourage OOB  Continue to encourage frequent repositioning

## 2024-08-27 NOTE — ASSESSMENT & PLAN NOTE
Started with loose stools since recently testing positive for covid  Had follow up with GI with colonoscopy on 06/04  Was prescribed linzess following GI appointment  Patient requesting medication to be discontinued d/t not being effective  Continue florastor and metamucil  Denies N/V  Continue to monitor for signs of dehydration  Encourage po fluid intake

## 2024-08-27 NOTE — ASSESSMENT & PLAN NOTE
Hemoglobin A1c (02/01/24)  6.4  Continue insulin lantus 38 units SQ BID  Blood sugars reviewed from facility records  Avoid hypoglycemia  Continue to monitor Q6 months  Encourage CCD  Monitor skin for breakdown, delayed healing

## 2024-08-27 NOTE — ASSESSMENT & PLAN NOTE
Symptoms of cough, stuffiness, mild congestion, decreased appetite, loose bowels  Tested with rapid covid swab at [facility] testing positive  Vitals (temp, pulse ox, HR) stable  Currently on RA  Continue to monitor

## 2024-08-27 NOTE — ASSESSMENT & PLAN NOTE
Hemoglobin A1c (02/01/24)  6.4  Continue insulin lantus 38 units SQ BID  Blood sugars reviewed from facility records  Continue to monitor Q6 months  Encourage CCD  Monitor skin for breakdown, delayed healing

## 2024-08-27 NOTE — PROGRESS NOTES
[address], [address], [place] [address]  [phone]    NAME: Abner King  AGE: 64 y.o. SEX: male    Progress Note    Location: [facility]  POS: 32 ([facility])  Code Status: DNR    Chief complaint / Reason for visit:  Acute visit    Assessment/Plan:    S/P BKA (below knee amputation), left ([facility])  Uses bilateral lower extremity prosthetics  Skin irritation starting from use of sleeve under prosthetic  Moisture associated  Start hydrocortisone cream application for redness or irritation  Monitor for skin breakdown    Type 2 diabetes mellitus with diabetic neuropathy, with long-term current use of insulin ([facility])  Hemoglobin A1c (02/01/24)  6.4  Continue insulin lantus 38 units SQ BID  Blood sugars reviewed from facility records  Continue to monitor Q6 months  Encourage CCD  Monitor skin for breakdown, delayed healing        This is a 64 y.o. male seen today at [facility].  Medical history includes, not limited to, s/p right AKA, history of left BKA, type 2 DM, peripheral vascular disease, gastroparesis, CHF, hyperlipidemia, GERD, anxiety, vitamin deficiency, and atherosclerotic heart disease.      Resident is seen today for an acute visit regarding a rash from his prosthetic noticed by nursing staff.  Upon entering the room he is out of bed, in his wheelchair.  He states that from the material on the sleeve that goes under his prosthetic, he develops a rash if he wears it too long.  He states that at the end of the day it is noticeable.  Denies pain or itchiness with leg rash.  No redness noticed on today's visit.  He states that he has been rolling the sleeve down when not using his prosthetic.          Reviewed resident with nursing staff. New orders placed for hydrocortisone cream for further redness and irritation.           Nursing and prior provider notes reviewed on this visit. Discussed visit with PCP and nursing staff/ supervisor.      Review of Systems    Constitutional:  Positive for activity change and appetite change. Negative for fever.   HENT:  Negative for congestion and rhinorrhea.    Eyes:  Negative for pain and visual disturbance.   Respiratory:  Negative for cough and shortness of breath.    Cardiovascular:  Negative for chest pain and leg swelling.   Gastrointestinal:  Positive for diarrhea. Negative for abdominal pain, constipation, nausea and vomiting.   Genitourinary:  Negative for difficulty urinating and dysuria.   Musculoskeletal:  Positive for gait problem (bilateral BKA). Negative for arthralgias.   Skin:  Positive for wound. Negative for color change and rash.   Neurological:  Negative for dizziness, syncope and weakness.   Psychiatric/Behavioral:  Negative for behavioral problems and confusion.    All other systems reviewed and are negative.         ALLERGY: Reviewed, unchanged  No Known Allergies    HISTORY:  Medical Hx: Reviewed, unchanged  Family Hx: Reviewed, unchanged  Soc Hx: Reviewed,  unchanged      Physical Exam  Vitals reviewed.   Constitutional:       General: He is not in acute distress.     Appearance: Normal appearance. He is not ill-appearing.   HENT:      Head: Normocephalic.      Right Ear: Tympanic membrane normal.      Left Ear: Tympanic membrane normal.      Nose: Nose normal. No congestion.      Mouth/Throat:      Mouth: Mucous membranes are moist.      Pharynx: Oropharynx is clear.   Eyes:      General:         Right eye: No discharge.         Left eye: No discharge.      Extraocular Movements: Extraocular movements intact.      Conjunctiva/sclera: Conjunctivae normal.      Pupils: Pupils are equal, round, and reactive to light.   Cardiovascular:      Rate and Rhythm: Normal rate and regular rhythm.      Pulses: Normal pulses.      Heart sounds: Normal heart sounds.   Pulmonary:      Effort: Pulmonary effort is normal. No respiratory distress.      Breath sounds: Normal breath sounds.   Chest:      Chest wall: No  "tenderness.   Abdominal:      General: Bowel sounds are normal.      Palpations: Abdomen is soft.      Tenderness: There is no abdominal tenderness.   Musculoskeletal:         General: Deformity present. No swelling. Normal range of motion.      Cervical back: Normal range of motion.      Right lower leg: No edema.      Left lower leg: No edema.   Skin:     General: Skin is warm and dry.   Neurological:      Mental Status: He is alert and oriented to person, place, and time. Mental status is at baseline.      Motor: No weakness.   Psychiatric:         Mood and Affect: Mood normal.         Behavior: Behavior normal.         Thought Content: Thought content normal.         Judgment: Judgment normal.            Laboratory results / Imaging reviewed: Hard copy/ies in medical chart:    No recent vitals provided for this visit.        Current Medications:  All medications reviewed and updated in Nursing Home Chart    Please note: This note was completed in part utilizing a voice-recognition software may have been used in the preparation of this document. Grammatical errors, random word insertion, spelling mistakes, and incomplete sentences may be an occasional consequence of the system secondary to software limitations, ambient noise and hardware issues. Occasional wrong word or \"sound-alike\" substitutions may have occurred due to the inherent limitations of voice recognition software. At the time of dictation, efforts were made to edit, clarify and/or correct errors. Interpretation should be guided by context. Please read the chart carefully and recognize, using context, where substitutions have occurred. If you have any questions or concerns about the context, text or information contained within the body of this dictation, please contact myself, the provider, for further clarification.      CARLTON Givens  8/27/2024  "

## 2024-08-27 NOTE — ASSESSMENT & PLAN NOTE
Received right lower prosthetic from [facility]  Is following with [facility]  Monitor site for any breakdown in skin  PT/OT as appropriate

## 2024-08-27 NOTE — ASSESSMENT & PLAN NOTE
Uses bilateral lower extremity prosthetics  Skin irritation starting from use of sleeve under prosthetic  Moisture associated  Can continue use of hydrocortisone as needed for irritation   Monitor for skin breakdown

## 2024-08-27 NOTE — PROGRESS NOTES
[facility]  [address], [place] [address]  [phone]    NAME: Abner King  AGE: 64 y.o. SEX: male    Progress Note    Location: [facility]  POS: 32 ([facility])  Code Status: DNR    Chief complaint / Reason for visit:  Follow up Visit    Assessment/Plan:    PAD (peripheral artery disease) ([facility])  S/p right AKA on 10/25/23  S/p left AKA on 08/03/2018  Continue aspirin 81 mg po daily  Continue atorvastatin 80 mg po daily  Continue clopidogrel 75 mg po daily    Gastroesophageal reflux disease without esophagitis  Continue PPI, protonix 20 mg po daily  Trial of discontinuation off protonix unsuccessful, resident had requested for medication to be placed back on  Stable   Encourage out of bed for meals, remain upright for at least 30 minutes following meals  Avoid trigger foods  Follow up with GI    Type 2 diabetes mellitus with diabetic neuropathy, with long-term current use of insulin ([facility])  Hemoglobin A1c (02/01/24)  6.4  Continue insulin lantus 38 units SQ BID  Blood sugars reviewed from facility records  Avoid hypoglycemia  Continue to monitor Q6 months  Encourage CCD  Monitor skin for breakdown, delayed healing    Ambulatory dysfunction  Secondary to bilateral lower extremity BKA  Restarted working with physical and occupational therapy as he is now fitted and has available bilateral lower extremity prosthetics   His goal is [facility] and then possibly be able to return home  Continue to encourage OOB  Continue to encourage frequent repositioning     S/P BKA (below knee amputation), left ([facility])  Uses bilateral lower extremity prosthetics  Skin irritation starting from use of sleeve under prosthetic  Moisture associated  Can continue use of hydrocortisone as needed for irritation   Monitor for skin breakdown    S/P AKA (above knee amputation), right ([facility])  Received right lower prosthetic from [facility]  Is following with [facility]  Monitor site for any breakdown in skin  PT/OT as appropriate        This is a 64 y.o. male seen today at [facility].  Medical history includes, not limited to, s/p right AKA, history of left BKA, type 2 DM, peripheral vascular disease, gastroparesis, CHF, hyperlipidemia, GERD, anxiety, vitamin deficiency, and atherosclerotic heart disease.      Resident is seen today for a routine follow up visit.  He is alert and oriented on today's visit.  Has had recent follow up appointments at [facility].  He would like to return home when safety devices and upgrades are made to his home.  Currently denies pain.  States he has been wearing his bilateral prosthetics with no issues.       Reviewed resident with nursing staff. No concerns at this time. Reviewed recent labs, vitals, and orders.         Nursing and prior provider notes reviewed on this visit. Discussed visit with PCP and nursing staff/ supervisor.      Review of Systems   Constitutional:  Negative for fever.   HENT:  Negative for congestion and rhinorrhea.    Eyes:  Negative for pain and visual disturbance.   Respiratory:  Negative for cough and shortness of breath.    Cardiovascular:  Negative for chest pain and leg swelling.   Gastrointestinal:  Negative for abdominal pain, constipation, nausea and vomiting.   Genitourinary:  Negative for difficulty urinating and dysuria.   Musculoskeletal:  Positive for gait problem (bilateral BKA). Negative for arthralgias.   Skin:  Negative for color change and rash.   Neurological:  Negative for dizziness, syncope and weakness.   Psychiatric/Behavioral:  Negative for behavioral problems and confusion.    All other systems reviewed and are negative.         ALLERGY: Reviewed, unchanged  No Known Allergies    HISTORY:  Medical Hx: Reviewed, unchanged  Family Hx: Reviewed, unchanged  Soc Hx: Reviewed,  unchanged      Physical Exam  Vitals reviewed.   Constitutional:        General: He is not in acute distress.     Appearance: Normal appearance. He is not ill-appearing.   HENT:      Head: Normocephalic.      Right Ear: Tympanic membrane normal.      Left Ear: Tympanic membrane normal.      Nose: Nose normal. No congestion.      Mouth/Throat:      Mouth: Mucous membranes are moist.      Pharynx: Oropharynx is clear.   Eyes:      General:         Right eye: No discharge.         Left eye: No discharge.      Extraocular Movements: Extraocular movements intact.      Conjunctiva/sclera: Conjunctivae normal.      Pupils: Pupils are equal, round, and reactive to light.   Cardiovascular:      Rate and Rhythm: Normal rate and regular rhythm.      Pulses: Normal pulses.      Heart sounds: Normal heart sounds.   Pulmonary:      Effort: Pulmonary effort is normal. No respiratory distress.      Breath sounds: Normal breath sounds.   Chest:      Chest wall: No tenderness.   Abdominal:      General: Bowel sounds are normal.      Palpations: Abdomen is soft.      Tenderness: There is no abdominal tenderness.   Musculoskeletal:         General: Deformity present. No swelling. Normal range of motion.      Cervical back: Normal range of motion.      Right lower leg: No edema.      Left lower leg: No edema.   Skin:     General: Skin is warm and dry.   Neurological:      Mental Status: He is alert and oriented to person, place, and time. Mental status is at baseline.      Motor: No weakness.   Psychiatric:         Mood and Affect: Mood normal.         Behavior: Behavior normal.         Thought Content: Thought content normal.         Judgment: Judgment normal.            Laboratory results / Imaging reviewed: Hard copy/ies in medical chart:    There were no vitals filed for this visit.      Current Medications:  All medications reviewed and updated in Nursing Home Chart    Please note: This note was completed in part utilizing a voice-recognition software may have been used in the preparation of this  "document. Grammatical errors, random word insertion, spelling mistakes, and incomplete sentences may be an occasional consequence of the system secondary to software limitations, ambient noise and hardware issues. Occasional wrong word or \"sound-alike\" substitutions may have occurred due to the inherent limitations of voice recognition software. At the time of dictation, efforts were made to edit, clarify and/or correct errors. Interpretation should be guided by context. Please read the chart carefully and recognize, using context, where substitutions have occurred. If you have any questions or concerns about the context, text or information contained within the body of this dictation, please contact myself, the provider, for further clarification.      CARLTON Givens  8/27/2024  "

## 2024-08-27 NOTE — PROGRESS NOTES
"[address], [address], [place] [address]  [phone]    NAME: Abner King  AGE: 64 y.o. SEX: male    Progress Note    Location: [facility]  POS: 32 ([facility])  Code Status: DNR    Chief complaint / Reason for visit: Acute Visit-cough    Assessment/Plan:    COVID-19  Symptoms of cough, stuffiness, mild congestion, decreased appetite, loose bowels  Tested with rapid covid swab at [facility] testing positive  Vitals (temp, pulse ox, HR) stable  Currently on RA  Continue to monitor    Loose stools  Started with loose stools since recently testing positive for covid  Had follow up with GI with colonoscopy on 06/04  Was prescribed linzess following GI appointment  Patient requesting medication to be discontinued d/t not being effective  Continue florastor and metamucil  Denies N/V  Continue to monitor for signs of dehydration  Encourage po fluid intake        This is a 64 y.o. male seen today at [facility].  Medical history includes, not limited to, s/p right AKA, history of left BKA, type 2 DM, peripheral vascular disease, gastroparesis, CHF, hyperlipidemia, GERD, anxiety, vitamin deficiency, and atherosclerotic heart disease.      Resident is seen today for an acute visit.  Recently swabbed due to cough with congestion and testing positive for covid.   He is seen today laying in bed.  States he feels as if he has a \"head cold\".  Feels tired and has been resting during the day.  The last time he had covid his bowel movements were loose, he states that he had a loose bowel movement yesterday.  Has a decreased appetite the past few days.  Is trying to eat small meals.      Reviewed resident with nursing staff. Reviewed recent labs, vitals, and orders.  Resident to remain on isolation precautions.  Nursing to continue to monitor.           Nursing and prior provider notes reviewed on this visit. Discussed visit with PCP and nursing staff/ supervisor.      Review of Systems "   Constitutional:  Positive for activity change and appetite change. Negative for fever.   HENT:  Negative for congestion and rhinorrhea.    Eyes:  Negative for pain and visual disturbance.   Respiratory:  Positive for cough. Negative for shortness of breath.    Cardiovascular:  Negative for chest pain and leg swelling.   Gastrointestinal:  Negative for abdominal pain, constipation, nausea and vomiting.   Genitourinary:  Negative for difficulty urinating and dysuria.   Musculoskeletal:  Positive for gait problem (bilateral BKA). Negative for arthralgias.   Skin:  Positive for wound. Negative for color change and rash.   Neurological:  Negative for dizziness, syncope and weakness.   Psychiatric/Behavioral:  Negative for behavioral problems and confusion.    All other systems reviewed and are negative.         ALLERGY: Reviewed, unchanged  No Known Allergies    HISTORY:  Medical Hx: Reviewed, unchanged  Family Hx: Reviewed, unchanged  Soc Hx: Reviewed,  unchanged      Physical Exam  Vitals reviewed.   Constitutional:       General: He is not in acute distress.     Appearance: Normal appearance. He is not ill-appearing.   HENT:      Head: Normocephalic.      Right Ear: Tympanic membrane normal.      Left Ear: Tympanic membrane normal.      Nose: Nose normal. No congestion.      Mouth/Throat:      Mouth: Mucous membranes are moist.      Pharynx: Oropharynx is clear.   Eyes:      General:         Right eye: No discharge.         Left eye: No discharge.      Extraocular Movements: Extraocular movements intact.      Conjunctiva/sclera: Conjunctivae normal.      Pupils: Pupils are equal, round, and reactive to light.   Cardiovascular:      Rate and Rhythm: Normal rate and regular rhythm.      Pulses: Normal pulses.      Heart sounds: Normal heart sounds.   Pulmonary:      Effort: Pulmonary effort is normal. No respiratory distress.      Breath sounds: Normal breath sounds.   Chest:      Chest wall: No tenderness.  "  Abdominal:      General: Bowel sounds are normal.      Palpations: Abdomen is soft.      Tenderness: There is no abdominal tenderness.   Musculoskeletal:         General: Deformity present. No swelling. Normal range of motion.      Cervical back: Normal range of motion.      Right lower leg: No edema.      Left lower leg: No edema.   Skin:     General: Skin is warm and dry.   Neurological:      Mental Status: He is alert and oriented to person, place, and time. Mental status is at baseline.      Motor: No weakness.   Psychiatric:         Mood and Affect: Mood normal.         Behavior: Behavior normal.         Thought Content: Thought content normal.         Judgment: Judgment normal.            Laboratory results / Imaging reviewed: Hard copy/ies in medical chart:    Vitals:    08/27/24 1732   BP: 126/68   Pulse: 66   Resp: 18   Temp: 97.9 °F (36.6 °C)   SpO2: 97%       Current Medications:  All medications reviewed and updated in Nursing Home Chart    Please note: This note was completed in part utilizing a voice-recognition software may have been used in the preparation of this document. Grammatical errors, random word insertion, spelling mistakes, and incomplete sentences may be an occasional consequence of the system secondary to software limitations, ambient noise and hardware issues. Occasional wrong word or \"sound-alike\" substitutions may have occurred due to the inherent limitations of voice recognition software. At the time of dictation, efforts were made to edit, clarify and/or correct errors. Interpretation should be guided by context. Please read the chart carefully and recognize, using context, where substitutions have occurred. If you have any questions or concerns about the context, text or information contained within the body of this dictation, please contact myself, the provider, for further clarification.      CARLTON Givens  8/27/2024  "

## 2024-08-27 NOTE — ASSESSMENT & PLAN NOTE
Uses bilateral lower extremity prosthetics  Skin irritation starting from use of sleeve under prosthetic  Moisture associated  Start hydrocortisone cream application for redness or irritation  Monitor for skin breakdown

## 2024-08-27 NOTE — ASSESSMENT & PLAN NOTE
Continue PPI, protonix 20 mg po daily  Trial of discontinuation off protonix unsuccessful, resident had requested for medication to be placed back on  Stable   Encourage out of bed for meals, remain upright for at least 30 minutes following meals  Avoid trigger foods  Follow up with GI

## 2024-09-11 ENCOUNTER — NURSING HOME VISIT (OUTPATIENT)
Dept: GERIATRICS | Facility: OTHER | Age: 64
End: 2024-09-11
Payer: COMMERCIAL

## 2024-09-11 DIAGNOSIS — Z89.611 S/P AKA (ABOVE KNEE AMPUTATION), RIGHT (HCC): ICD-10-CM

## 2024-09-11 DIAGNOSIS — I25.10 CORONARY ARTERY DISEASE INVOLVING NATIVE CORONARY ARTERY OF NATIVE HEART WITHOUT ANGINA PECTORIS: ICD-10-CM

## 2024-09-11 DIAGNOSIS — Z79.4 TYPE 2 DIABETES MELLITUS WITH DIABETIC NEUROPATHY, WITH LONG-TERM CURRENT USE OF INSULIN (HCC): Primary | ICD-10-CM

## 2024-09-11 DIAGNOSIS — E78.2 MIXED HYPERLIPIDEMIA: ICD-10-CM

## 2024-09-11 DIAGNOSIS — K21.9 GASTROESOPHAGEAL REFLUX DISEASE WITHOUT ESOPHAGITIS: ICD-10-CM

## 2024-09-11 DIAGNOSIS — N40.1 BENIGN PROSTATIC HYPERPLASIA WITH URINARY RETENTION: ICD-10-CM

## 2024-09-11 DIAGNOSIS — K31.84 DIABETIC GASTROPARESIS  (HCC): Chronic | ICD-10-CM

## 2024-09-11 DIAGNOSIS — F32.A ANXIETY AND DEPRESSION: ICD-10-CM

## 2024-09-11 DIAGNOSIS — E11.40 TYPE 2 DIABETES MELLITUS WITH DIABETIC NEUROPATHY, WITH LONG-TERM CURRENT USE OF INSULIN (HCC): Primary | ICD-10-CM

## 2024-09-11 DIAGNOSIS — F41.9 ANXIETY AND DEPRESSION: ICD-10-CM

## 2024-09-11 DIAGNOSIS — Z89.512 S/P BKA (BELOW KNEE AMPUTATION), LEFT (HCC): ICD-10-CM

## 2024-09-11 DIAGNOSIS — I50.32 CHRONIC HEART FAILURE WITH PRESERVED EJECTION FRACTION (HCC): ICD-10-CM

## 2024-09-11 DIAGNOSIS — I73.9 PAD (PERIPHERAL ARTERY DISEASE) (HCC): ICD-10-CM

## 2024-09-11 DIAGNOSIS — U07.1 COVID-19: ICD-10-CM

## 2024-09-11 DIAGNOSIS — E11.43 DIABETIC GASTROPARESIS  (HCC): Chronic | ICD-10-CM

## 2024-09-11 DIAGNOSIS — R33.8 BENIGN PROSTATIC HYPERPLASIA WITH URINARY RETENTION: ICD-10-CM

## 2024-09-11 DIAGNOSIS — R19.5 LOOSE STOOLS: ICD-10-CM

## 2024-09-11 PROCEDURE — 99309 SBSQ NF CARE MODERATE MDM 30: CPT | Performed by: STUDENT IN AN ORGANIZED HEALTH CARE EDUCATION/TRAINING PROGRAM

## 2024-09-12 NOTE — ASSESSMENT & PLAN NOTE
Noted hx of BPH with hx of urinary retention per chart review  Continues on tamsulosin, finasteride  Appears stable  Continue to monitor  Last PSA was WNL in 2016; consider updated monitoring of PSA if in line with goals of care  Follow up with Urology PRN

## 2024-09-12 NOTE — ASSESSMENT & PLAN NOTE
Tested positive on 8/24 for covid (several residents have been positive on unit recently)  Symptoms had included some cough/congestion and worsened chronic diarrhea, symptoms seems improved at this time  Monitor respiratory status - stable on room air  Supportive care, encourage IS  Isolation as per facility protocol - now off isolation, acute infection appears clinically resolved

## 2024-09-12 NOTE — ASSESSMENT & PLAN NOTE
"Seems to be having intermittent loose stools since he had COVID in Dec 2023  Patient feels diarrhea is stable over time, and overall improved (some recent worsening having COVID again in Aug 2024)  Denies associated GI symptoms including abdominal pain, nausea, vomiting. He does pass gas. He reports good appetite.  Recently having more formed/soft stools, not watery, BM typically every 2 days so not very frequent  Stool occult blood has been negative  Stool PCR panel has been negative  He was on reglan for gastroparesis, this has been stopped  Banana flakes were tried and reportedly ineffective  Pepto bismol was tried and reportedly ineffective although patient notes taking higher doses in the past was more helpful and requesting to be allowed to bring home pepto bismol, will discuss with team  Trial of linzess was ineffective  Continues on probiotic and metamucil though does not feel these have changed the diarrhea significantly  XR abdomen 3/6/24 with presence of stool but \"There is no bowel obstruction\"  Could consider further abdominal imaging however presently no acute abdominal exam findings  Patient does report hx of lactose intolerance but states he is careful and takes mainly lactose free products  Recent TSH and tTG-IgA WNL  He was evaluated by GI in office 4/18/24  Colonoscopy done recently 6/4/24, see note  Monitor BMP routinely to monitor electrolytes - recently seems stable  Continue to monitor  Encourage PO intake/hydration  Follow up with GI as appropriate  "

## 2024-09-12 NOTE — ASSESSMENT & PLAN NOTE
No acute cardiac complaints, seems stable  Continue aspirin, plavix, statin  Follow up with Cardiology as needed

## 2024-09-12 NOTE — PROGRESS NOTES
[facility]  Facility: [facility]    PROGRESS NOTE  Nursing Home Place of Service: nursing home place of service: POS 32 Unskilled- No Part A Coverage    NAME: Abner King  : 1960 AGE: 64 y.o. SEX: male MRN: [id number]  DATE OF ENCOUNTER: 2024    Assessment and Plan     Problem List Items Addressed This Visit       Anxiety and depression     Mood stable  Stressors include health complications, amputations which he is getting used to  Used to be on alprazolam PRN, has been weaned off as of 2024  Continue supportive care  Monitor for change in mood/behaviors          Type 2 diabetes mellitus with diabetic neuropathy, with long-term current use of insulin (HCC) - Primary       Lab Results   Component Value Date    HGBA1C 6.7 (A) 2023       A1c 6.4 as of 24  Monitor glucose - generally well controlled in low-mid 100s  Avoid hypoglycemia  Continue lantus, 38u BID with hold parameters, consider weaning slightly if sugars remain well controlled or become lower  Encourage CCD; Encourage lifestyle modifications including healthy diet, weight management, exercise as appropriate  Continue to monitor, check A1c periodically         Hyperlipidemia     Continue statin  Monitor lipids periodically - appear well controlled         PAD (peripheral artery disease) ([facility])     S/p right AKA on 10/25/23  Continue aspirin, statin, plavix         S/P BKA (below knee amputation), left ([facility])     Uses prosthetic  Monitor for skin breakdown  PT/OT         Diabetic gastroparesis  ([facility]) (Chronic)       Lab Results   Component Value Date    HGBA1C 6.7 (A) 2023     Hx of diabetic gastroparesis with frequent constipation in the past however recently diarrhea has been his main concern  See plan under Diarrhea         Chronic heart failure with preserved ejection fraction (HCC)     Wt Readings from Last 3 Encounters:   24 91.4 kg (201 lb 6.4 oz)   24 91.4 kg (201 lb 6.4 oz)  "  06/17/24 90 kg (198 lb 8 oz)       2D echocardiogram 10/22: left ventricular ejection fraction 61%. Grade 1 diastolic dysfunction   Monitor routine weight - fairly stable recently, no alarming uptrend, some variability likely related to weighing methods  Monitor volume status clinically - appears euvolemic, no orthopnea  Continue lasix PO 40mg BID             CAD (coronary artery disease)     No acute cardiac complaints, seems stable  Continue aspirin, plavix, statin  Follow up with Cardiology as needed         Loose stools     Seems to be having intermittent loose stools since he had COVID in Dec 2023  Patient feels diarrhea is stable over time, and overall improved (some recent worsening having COVID again in Aug 2024)  Denies associated GI symptoms including abdominal pain, nausea, vomiting. He does pass gas. He reports good appetite.  Recently having more formed/soft stools, not watery, BM typically every 2 days so not very frequent  Stool occult blood has been negative  Stool PCR panel has been negative  He was on reglan for gastroparesis, this has been stopped  Banana flakes were tried and reportedly ineffective  Pepto bismol was tried and reportedly ineffective although patient notes taking higher doses in the past was more helpful and requesting to be allowed to bring home pepto bismol, will discuss with team  Trial of linzess was ineffective  Continues on probiotic and metamucil though does not feel these have changed the diarrhea significantly  XR abdomen 3/6/24 with presence of stool but \"There is no bowel obstruction\"  Could consider further abdominal imaging however presently no acute abdominal exam findings  Patient does report hx of lactose intolerance but states he is careful and takes mainly lactose free products  Recent TSH and tTG-IgA WNL  He was evaluated by GI in office 4/18/24  Colonoscopy done recently 6/4/24, see note  Monitor BMP routinely to monitor electrolytes - recently seems " stable  Continue to monitor  Encourage PO intake/hydration  Follow up with GI as appropriate         S/P AKA (above knee amputation), right (HCC)     Cleared by vascular surgery for weight bearing status  Has prosthetic  Monitor site for any breakdown in skin  PT/OT as appropriate         COVID-19     Tested positive on 8/24 for covid (several residents have been positive on unit recently)  Symptoms had included some cough/congestion and worsened chronic diarrhea, symptoms seems improved at this time  Monitor respiratory status - stable on room air  Supportive care, encourage IS  Isolation as per facility protocol - now off isolation, acute infection appears clinically resolved           Gastroesophageal reflux disease without esophagitis     -stable  -continue PPI (did not tolerate recent attempt at discontinuing pantoprazole due to recurrence of symptoms)  -recommend OOB with meals, sit upright for at least 30 minutes afterwards, avoid trigger foods  -continue to monitor  -follow up with GI as appropriate         Benign prostatic hyperplasia with urinary retention     Noted hx of BPH with hx of urinary retention per chart review  Continues on tamsulosin, finasteride  Appears stable  Continue to monitor  Last PSA was WNL in 2016; consider updated monitoring of PSA if in line with goals of care  Follow up with Urology PRN                    Chief Complaint     Follow up 60 day    History of Present Illness     Abner King is a 64 y.o. male who was seen today for follow up. PMH including s/p right AKA (Oct 2023), history of left BKA, type 2 DM, peripheral vascular disease, gastroparesis, CHF, hyperlipidemia, GERD, anxiety, vitamin deficiency, and atherosclerotic heart disease     Patient seen and examined in room  Others present: none  Patient laying in bed, head elevated, able to reposition independently, working on his phone  Appears comfortable, awake, alert, oriented to situation, able to converse  "appropriately  Patient Aox3, appears to be a fair historian, polite and in good spirits, mentation seems stable compared to my prior visit. Notes he has been doing well recently overall with no new/acute concerns at this time. Feels his chronic diarrhea (issue seemed to start when he had COVID some time ago) is overall improved/stable; stool typically soft but not watery like it used to be; last BM yesterday soft/loose but not watery; denies any abdominal pain, N/V, blood in stool, or other acute GI symptoms. He had recent colonoscopy and GI visit. Apart from the diarrhea concern he feels well; appetite good, no swallowing concerns, breathing fine, no orthopnea, no pain anywhere, no recent abdominal pain/nausea/vomiting. No recent CP/palpitations or orthostatic lightheadedness. Urinating fine independently without acute symptoms, has bedside urinal with clear yellow urine. No acute cardiopulmonary, abdominal, or urinary symptoms; see ROS for more details. He is working with therapy regarding his prosthetics, reports this has been going well and feels he is slowly making progress including being ble to ambulate up to 150 feet with walker recently, and his ultimate goal is to be able to be discharged and return home.     No further questions or acute concerns identified.  No acute concerns per nursing or NP.        XR KUB 3/6/24 \"There is no bowel obstruction\"  Colonoscopy 6/4/24:   \"Subcentimeter polyp in the ascending colon was removed with cold snare  Edematous mucosa in the sigmoid colon; performed cold forceps biopsy  Redundant folds in the sigmoid colon.  The cecum, hepatic flexure, transverse colon, splenic flexure, descending colon and rectum appeared normal.\"  Await pathology results  Repeat colonoscopy in 7 years, due: 6/3/2031  A. Polyp, Colorectal, ascending colon polyp/cold snare:      - Tubular adenoma      - No high-grade dysplasia and no evidence of malignancy    B. Large Intestine, Sigmoid Colon, " "sigmoid colon redundant fold 50cm bx:      - Colonic mucosa with focal surface hyperplastic change       - Negative for dysplasia or carcinoma \"       Lab Review:   Old TSH from 2020 was 4.712 (slightly high)  12/28: BMP with K 3.5, CBC with Hb 10.7  1/2: BMP with K 3.4, CBC with Hb 11  2/1: lipids WNL, A1c 6.4  2/5: stool PCR panel including C.diff negative  2/9: BMP generally stable/non-actionable, GFR >100  2/19: stool occult blood negative  3/13/24: TSH WNL; ttg iga antibody WNL        EKG 10/25/23: NSR, 78bpm, Qtc 483  Echo 10/22/23: EF 61, grade 1 diastolic dysfunction, no notable valvular stenosis      The following portions of the patient's history were reviewed and updated as appropriate: allergies, current medications, past family history, past medical history, past social history, past surgical history and problem list.    Review of Systems     Review of Systems   Constitutional:  Negative for appetite change, chills, diaphoresis and fever.   HENT:  Negative for drooling, ear pain, hearing loss, rhinorrhea, sore throat and trouble swallowing.    Eyes:  Negative for pain, discharge, redness, itching and visual disturbance.   Respiratory:  Negative for cough, chest tightness, shortness of breath and wheezing.    Cardiovascular:  Negative for chest pain and palpitations.   Gastrointestinal:  Positive for diarrhea (stable/intermittent). Negative for abdominal pain, blood in stool, constipation, nausea and vomiting.   Genitourinary:  Negative for difficulty urinating, dysuria, flank pain and hematuria.   Musculoskeletal:  Positive for gait problem. Negative for arthralgias and back pain.   Skin:  Negative for color change.   Neurological:  Negative for dizziness, tremors, seizures, facial asymmetry, speech difficulty, weakness and headaches.   Psychiatric/Behavioral:  Negative for agitation, behavioral problems and confusion. The patient is not nervous/anxious and is not hyperactive.        Active Problem List "     Patient Active Problem List   Diagnosis    Anxiety and depression    Type 2 diabetes mellitus with diabetic neuropathy, with long-term current use of insulin ([facility])    Hyperlipidemia    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    Vitamin D deficiency    PAD (peripheral artery disease) ([facility])    S/P BKA (below knee amputation), left ([facility])    Orthostatic hypotension    Diabetic gastroparesis  ([facility])    Class 2 severe obesity due to excess calories with serious comorbidity and body mass index (BMI) of 35.0 to 35.9 in adult ([facility])    Triple vessel coronary artery disease    Hypertensive heart disease with congestive heart failure (HCC)    Chronic heart failure with preserved ejection fraction ([facility])    S/P CABG x 3    Diabetic autonomic neuropathy associated with type 2 diabetes mellitus ([facility])    Hyponatremia    Obstructive sleep apnea    Phantom limb syndrome without pain ([facility])    CAD (coronary artery disease)    Elephantiasis nostras verrucosa    Embolism and thrombosis of arteries of the lower extremities ([facility])    Lymphedema of right lower extremity    Venous stasis dermatitis of right lower extremity    Abdominal distension    Non-pressure chronic ulcer of right calf with fat layer exposed ([facility])    Cellulitis of right ankle    Lymphedema in adult patient    Diabetic ulcer of right heel ([facility])    Loose stools    Elevated troponin    Gangrene ([facility])    Anemia    S/P AKA (above knee amputation), right ([facility])    Chills    COVID-19    Advance care planning    Hypokalemia    Gastroesophageal reflux disease without esophagitis    Skin abnormality    History of COVID-19    Benign prostatic hyperplasia with urinary retention    Ambulatory dysfunction       Objective     Vital Signs:     BP: 135/64 mmHg  9/1/2024 11:58 Temp:97.9 °F  9/1/2024 11:58 Pulse:68 bpm  9/1/2024 11:58 Weight:207.1 Lbs  9/7/2024 11:20   Resp:20 Breaths/min  9/1/2024 11:58 BS:150 mg/dL  9/11/2024 21:03 O2:96  %  9/1/2024 11:58 Pain:0  9/11/2024 17:15       Physical Exam  Vitals reviewed.   Constitutional:       General: He is not in acute distress.     Appearance: He is not toxic-appearing or diaphoretic.   HENT:      Head: Normocephalic and atraumatic.      Right Ear: External ear normal.      Left Ear: External ear normal.      Nose: Nose normal. No rhinorrhea.      Mouth/Throat:      Mouth: Mucous membranes are moist.      Pharynx: Oropharynx is clear. No oropharyngeal exudate or posterior oropharyngeal erythema.   Eyes:      General: No scleral icterus.        Right eye: No discharge.         Left eye: No discharge.      Extraocular Movements: Extraocular movements intact.      Conjunctiva/sclera: Conjunctivae normal.      Pupils: Pupils are equal, round, and reactive to light.   Cardiovascular:      Rate and Rhythm: Normal rate and regular rhythm.   Pulmonary:      Effort: Pulmonary effort is normal. No respiratory distress.      Breath sounds: No wheezing or rales.   Abdominal:      General: Bowel sounds are normal. There is no distension.      Palpations: Abdomen is soft.      Tenderness: There is no abdominal tenderness. There is no guarding.   Musculoskeletal:         General: No tenderness.      Cervical back: No rigidity.      Comments: S/p L BKA and R AKA, sites appear clean/dry/well healed   Skin:     General: Skin is warm and dry.      Coloration: Skin is not jaundiced.   Neurological:      General: No focal deficit present.      Mental Status: He is alert and oriented to person, place, and time. Mental status is at baseline.   Psychiatric:         Mood and Affect: Mood normal.         Behavior: Behavior normal.         Thought Content: Thought content normal.         Judgment: Judgment normal.         Pertinent Laboratory/Diagnostic Studies:  Laboratory and Imaging studies reviewed. Full report in the paper chart.     Current Medications   Medications reviewed and updated in facility chart.      -Total  time spent on this encounter today including documentation and workup review, face to face time, history and exam, and documentation/orders was approximately 35 minutes.  -This note will be copied to [facility] EMR where vitals and medication orders are placed.    Arias Fox D.O.  Geriatric Medicine  9/11/2024 10:25 PM

## 2024-09-12 NOTE — ASSESSMENT & PLAN NOTE
Wt Readings from Last 3 Encounters:   08/27/24 91.4 kg (201 lb 6.4 oz)   08/13/24 91.4 kg (201 lb 6.4 oz)   06/17/24 90 kg (198 lb 8 oz)       2D echocardiogram 10/22: left ventricular ejection fraction 61%. Grade 1 diastolic dysfunction   Monitor routine weight - fairly stable recently, no alarming uptrend, some variability likely related to weighing methods  Monitor volume status clinically - appears euvolemic, no orthopnea  Continue lasix PO 40mg BID

## 2024-09-12 NOTE — ASSESSMENT & PLAN NOTE
Lab Results   Component Value Date    HGBA1C 6.7 (A) 02/02/2023       A1c 6.4 as of 2/1/24  Monitor glucose - generally well controlled in low-mid 100s  Avoid hypoglycemia  Continue lantus, 38u BID with hold parameters, consider weaning slightly if sugars remain well controlled or become lower  Encourage CCD; Encourage lifestyle modifications including healthy diet, weight management, exercise as appropriate  Continue to monitor, check A1c periodically

## 2024-09-16 NOTE — ASSESSMENT & PLAN NOTE
AUTHORIZATION FOR RELEASE OF   CONFIDENTIAL INFORMATION    Dear medical records,    We are seeing Denise Reece, date of birth 1947, in the clinic at [address]. Zhane Alvarez MD is the patient's PCP. Denise Reece has an outstanding lab/procedure at the time we reviewed her chart. In order to help keep her health information updated, she has authorized us to request the following medical record(s):        (  )  MAMMOGRAM                                      (  )  COLONOSCOPY      (  )  PAP SMEAR                                          ( X )  OUTSIDE LAB RESULTS     (  )  DEXA SCAN                                          (  )  EYE EXAM            (  )  FOOT EXAM                                          (  )  ENTIRE RECORD     (  )  OUTSIDE IMMUNIZATIONS                 (  )  _______________         Please fax records to Zhane Alvarez MD, [phone]. Patient has an appt next week with Dr. Tinsley     If you have any questions, please contact us at [phone].           Patient Name: Denise Reece  : 1947  Patient Phone #: [phone]      Wt Readings from Last 3 Encounters:   10/26/23 103 kg (226 lb 3.1 oz)   09/02/23 118 kg (259 lb 7.7 oz)   07/31/23 118 kg (260 lb)     Patient has a history of chronic diastolic congestive heart failure  Most recent 2D echocardiogram 10/22: Left ventricular ejection fraction 61%. Grade 1 diastolic dysfunction.   Patient was placed on IV Lasix due to lower extremity edema  Lungs clear to auscultation  transitioned back to home Lasix 40 mg p.o. twice daily  Currently euvolemic post op

## 2024-10-01 ENCOUNTER — VBI (OUTPATIENT)
Dept: ADMINISTRATIVE | Facility: OTHER | Age: 64
End: 2024-10-01

## 2024-10-01 NOTE — TELEPHONE ENCOUNTER
10/01/24 11:17 AM     Chart reviewed for Hemoglobin A1c ; nothing is submitted to the patient's insurance at this time.     Safia Hopkins MA   [facility]

## 2024-10-11 ENCOUNTER — NURSING HOME VISIT (OUTPATIENT)
Dept: GERIATRICS | Facility: OTHER | Age: 64
End: 2024-10-11
Payer: COMMERCIAL

## 2024-10-11 VITALS — SYSTOLIC BLOOD PRESSURE: 114 MMHG | DIASTOLIC BLOOD PRESSURE: 64 MMHG | HEART RATE: 77 BPM | TEMPERATURE: 97.4 F

## 2024-10-11 DIAGNOSIS — E11.65 TYPE 2 DIABETES MELLITUS WITH HYPERGLYCEMIA, WITH LONG-TERM CURRENT USE OF INSULIN (HCC): ICD-10-CM

## 2024-10-11 DIAGNOSIS — Z89.512 S/P BKA (BELOW KNEE AMPUTATION), LEFT (HCC): ICD-10-CM

## 2024-10-11 DIAGNOSIS — R33.8 BENIGN PROSTATIC HYPERPLASIA WITH URINARY RETENTION: ICD-10-CM

## 2024-10-11 DIAGNOSIS — N40.1 BENIGN PROSTATIC HYPERPLASIA WITH URINARY RETENTION: ICD-10-CM

## 2024-10-11 DIAGNOSIS — F32.A ANXIETY AND DEPRESSION: ICD-10-CM

## 2024-10-11 DIAGNOSIS — F41.9 ANXIETY AND DEPRESSION: ICD-10-CM

## 2024-10-11 DIAGNOSIS — E11.43 DIABETIC GASTROPARESIS  (HCC): Chronic | ICD-10-CM

## 2024-10-11 DIAGNOSIS — K31.84 DIABETIC GASTROPARESIS  (HCC): Chronic | ICD-10-CM

## 2024-10-11 DIAGNOSIS — Z79.4 TYPE 2 DIABETES MELLITUS WITH HYPERGLYCEMIA, WITH LONG-TERM CURRENT USE OF INSULIN (HCC): ICD-10-CM

## 2024-10-11 DIAGNOSIS — I11.0 HYPERTENSIVE HEART DISEASE WITH CONGESTIVE HEART FAILURE, UNSPECIFIED HEART FAILURE TYPE (HCC): ICD-10-CM

## 2024-10-11 DIAGNOSIS — I50.32 CHRONIC HEART FAILURE WITH PRESERVED EJECTION FRACTION (HCC): ICD-10-CM

## 2024-10-11 DIAGNOSIS — R26.2 AMBULATORY DYSFUNCTION: ICD-10-CM

## 2024-10-11 DIAGNOSIS — Z89.611 S/P AKA (ABOVE KNEE AMPUTATION), RIGHT (HCC): ICD-10-CM

## 2024-10-11 DIAGNOSIS — I25.10 CORONARY ARTERY DISEASE INVOLVING NATIVE CORONARY ARTERY OF NATIVE HEART WITHOUT ANGINA PECTORIS: Primary | ICD-10-CM

## 2024-10-11 PROBLEM — L03.115 CELLULITIS OF RIGHT ANKLE: Status: RESOLVED | Noted: 2023-04-18 | Resolved: 2024-10-11

## 2024-10-11 PROBLEM — I89.0 LYMPHEDEMA OF RIGHT LOWER EXTREMITY: Status: RESOLVED | Noted: 2020-10-29 | Resolved: 2024-10-11

## 2024-10-11 PROBLEM — U07.1 COVID-19: Status: RESOLVED | Noted: 2023-12-29 | Resolved: 2024-10-11

## 2024-10-11 PROBLEM — R14.0 ABDOMINAL DISTENSION: Status: RESOLVED | Noted: 2020-10-29 | Resolved: 2024-10-11

## 2024-10-11 PROBLEM — R68.83 CHILLS: Status: RESOLVED | Noted: 2023-12-01 | Resolved: 2024-10-11

## 2024-10-11 PROBLEM — L97.212 NON-PRESSURE CHRONIC ULCER OF RIGHT CALF WITH FAT LAYER EXPOSED (HCC): Status: RESOLVED | Noted: 2023-02-02 | Resolved: 2024-10-11

## 2024-10-11 PROBLEM — I89.0 LYMPHEDEMA IN ADULT PATIENT: Status: RESOLVED | Noted: 2023-06-27 | Resolved: 2024-10-11

## 2024-10-11 PROCEDURE — 99309 SBSQ NF CARE MODERATE MDM 30: CPT

## 2024-10-11 NOTE — ASSESSMENT & PLAN NOTE
Reports constipation x4-5 days followed by diarrhea  Continue senna, florastor, and fiber supplements

## 2024-10-11 NOTE — ASSESSMENT & PLAN NOTE
In the setting of bilateral BKAs   Uses prosthetics  Working on car transfers with family today   Reports steps are still a challenge for him and he requires railings on either side of stairs, unable to use one railing

## 2024-10-11 NOTE — PROGRESS NOTES
[facility]  Kian Hogue  [address], [place], [place]  [phone]  [facility]    NAME: Abner King  AGE: 64 y.o. SEX: male  : 1960     DATE: 10/11/2024    CODE STATUS: No CPR     Assessment and Plan:     Problem List Items Addressed This Visit       Anxiety and depression     Mood appears stable   Not currently on medications for anxiety/depression   Provide psychosocial support          Type 2 diabetes mellitus with hyperglycemia, with long-term current use of insulin (HCC)     Last A1c 6.4 in 2024  BS log reviewed, appears well controlled  Continue lantus 38 units BID  Has CGM   Encourage CCD diet            S/P BKA (below knee amputation), left (HCC)     Uses prosthetic  Monitor for skin breakdown  PT/OT         Diabetic gastroparesis  ([facility]) (Chronic)     Reports constipation x4-5 days followed by diarrhea  Continue senna, florastor, and fiber supplements            Hypertensive heart disease with congestive heart failure ([facility])     BP stable   Currently on lasix   Continue to monitor          Chronic heart failure with preserved ejection fraction ([facility])     Wt Readings from Last 3 Encounters:   24 91.4 kg (201 lb 6.4 oz)   24 91.4 kg (201 lb 6.4 oz)   24 90 kg (198 lb 8 oz)     Stable   Appears euvolemic   Continue lasix   Monitor weights and volume status   F/u with cardiology            CAD (coronary artery disease) - Primary     Hx of CABG x3   Currently asymptomatic   Cont aspirin, plavix, and statin   F/u with cardiology         S/P AKA (above knee amputation), right ([facility])     Uses prosthetic   Monitor for skin breakdown          Benign prostatic hyperplasia with urinary retention     Hx of BPH with urinary retention   Currently voiding independently   Continue flomax and finasteride   Follows with urology          Ambulatory dysfunction     In the setting of bilateral BKAs   Uses prosthetics  Working on car transfers with family today    Reports steps are still a challenge for him and he requires railings on either side of stairs, unable to use one railing                History of Present Illness:     Patient doing well without acute concerns. Continues with intermittent diarrhea and constipation. Reports he will hopefully be returning home at the end of next month as his family will be moving to a new place without stairs. He is working on car transfers today with sister. Denies CP/SOB. Reports ability to ambulate the length of the guardado with his prosthetics.         The following portions of the patient's history were reviewed and updated as appropriate: allergies, current medications, past family history, past medical history, past social history, past surgical history and problem list.     Review of Systems:     Review of Systems   Gastrointestinal:  Positive for constipation and diarrhea.   Musculoskeletal:  Positive for gait problem.   All other systems reviewed and are negative.       Problem List:     Patient Active Problem List   Diagnosis    Anxiety and depression    Type 2 diabetes mellitus with hyperglycemia, with long-term current use of insulin ([facility])    Hyperlipidemia    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    Vitamin D deficiency    PAD (peripheral artery disease) ([facility])    S/P BKA (below knee amputation), left ([facility])    Orthostatic hypotension    Diabetic gastroparesis  ([facility])    Class 2 severe obesity due to excess calories with serious comorbidity and body mass index (BMI) of 35.0 to 35.9 in adult ([facility])    Triple vessel coronary artery disease    Hypertensive heart disease with congestive heart failure ([facility])    Chronic heart failure with preserved ejection fraction ([facility])    S/P CABG x 3    Diabetic autonomic neuropathy associated with type 2 diabetes mellitus ([facility])    Hyponatremia    Obstructive sleep apnea    Phantom limb syndrome without pain ([facility])    CAD (coronary artery disease)     Elephantiasis nostras verrucosa    Embolism and thrombosis of arteries of the lower extremities (HCC)    Venous stasis dermatitis of right lower extremity    Diabetic ulcer of right heel (HCC)    Loose stools    Elevated troponin    Gangrene (HCC)    Anemia    S/P AKA (above knee amputation), right (HCC)    Advance care planning    Hypokalemia    Gastroesophageal reflux disease without esophagitis    Skin abnormality    History of COVID-19    Benign prostatic hyperplasia with urinary retention    Ambulatory dysfunction        Objective:     /64   Pulse 77   Temp (!) 97.4 °F (36.3 °C)     Physical Exam  Vitals and nursing note reviewed.   Constitutional:       General: He is not in acute distress.     Appearance: He is well-developed.   HENT:      Head: Normocephalic and atraumatic.   Eyes:      Conjunctiva/sclera: Conjunctivae normal.   Cardiovascular:      Rate and Rhythm: Normal rate and regular rhythm.      Heart sounds: No murmur heard.  Pulmonary:      Effort: Pulmonary effort is normal. No respiratory distress.      Breath sounds: Normal breath sounds.   Abdominal:      Palpations: Abdomen is soft.      Tenderness: There is no abdominal tenderness.   Musculoskeletal:         General: No swelling.      Cervical back: Neck supple.      Right Lower Extremity: Right leg is amputated below knee.      Left Lower Extremity: Left leg is amputated below knee.   Skin:     General: Skin is warm and dry.      Capillary Refill: Capillary refill takes less than 2 seconds.   Neurological:      Mental Status: He is alert.   Psychiatric:         Mood and Affect: Mood normal.         Pertinent Laboratory/Diagnostic Studies:    Laboratory Results: I have personally reviewed the pertinent laboratory results/reports     Radiology/Other Diagnostic Testing Results: Results Review Statement: No pertinent imaging studies reviewed.    CARLTON Barron  Geriatric Medicine

## 2024-10-11 NOTE — ASSESSMENT & PLAN NOTE
Last A1c 6.4 in February 2024  BS log reviewed, appears well controlled  Continue lantus 38 units BID  Has CGM   Encourage CCD diet

## 2024-10-11 NOTE — ASSESSMENT & PLAN NOTE
Mood appears stable   Not currently on medications for anxiety/depression   Provide psychosocial support

## 2024-10-11 NOTE — ASSESSMENT & PLAN NOTE
Hx of BPH with urinary retention   Currently voiding independently   Continue flomax and finasteride   Follows with urology

## 2024-10-11 NOTE — ASSESSMENT & PLAN NOTE
Wt Readings from Last 3 Encounters:   08/27/24 91.4 kg (201 lb 6.4 oz)   08/13/24 91.4 kg (201 lb 6.4 oz)   06/17/24 90 kg (198 lb 8 oz)     Stable   Appears euvolemic   Continue lasix   Monitor weights and volume status   F/u with cardiology

## 2024-11-06 ENCOUNTER — NURSING HOME VISIT (OUTPATIENT)
Dept: GERIATRICS | Facility: OTHER | Age: 64
End: 2024-11-06
Payer: COMMERCIAL

## 2024-11-06 DIAGNOSIS — F41.9 ANXIETY AND DEPRESSION: ICD-10-CM

## 2024-11-06 DIAGNOSIS — I25.10 CORONARY ARTERY DISEASE INVOLVING NATIVE CORONARY ARTERY OF NATIVE HEART WITHOUT ANGINA PECTORIS: ICD-10-CM

## 2024-11-06 DIAGNOSIS — Z78.9 IMPAIRED MOBILITY AND ADLS: ICD-10-CM

## 2024-11-06 DIAGNOSIS — Z89.611 S/P AKA (ABOVE KNEE AMPUTATION), RIGHT (HCC): ICD-10-CM

## 2024-11-06 DIAGNOSIS — K21.9 GASTROESOPHAGEAL REFLUX DISEASE WITHOUT ESOPHAGITIS: ICD-10-CM

## 2024-11-06 DIAGNOSIS — Z79.4 TYPE 2 DIABETES MELLITUS WITH HYPERGLYCEMIA, WITH LONG-TERM CURRENT USE OF INSULIN (HCC): ICD-10-CM

## 2024-11-06 DIAGNOSIS — F32.A ANXIETY AND DEPRESSION: ICD-10-CM

## 2024-11-06 DIAGNOSIS — R19.5 LOOSE STOOLS: ICD-10-CM

## 2024-11-06 DIAGNOSIS — E11.65 TYPE 2 DIABETES MELLITUS WITH HYPERGLYCEMIA, WITH LONG-TERM CURRENT USE OF INSULIN (HCC): ICD-10-CM

## 2024-11-06 DIAGNOSIS — I73.9 PAD (PERIPHERAL ARTERY DISEASE) (HCC): ICD-10-CM

## 2024-11-06 DIAGNOSIS — K31.84 DIABETIC GASTROPARESIS  (HCC): Chronic | ICD-10-CM

## 2024-11-06 DIAGNOSIS — E11.43 DIABETIC GASTROPARESIS  (HCC): Chronic | ICD-10-CM

## 2024-11-06 DIAGNOSIS — E78.2 MIXED HYPERLIPIDEMIA: ICD-10-CM

## 2024-11-06 DIAGNOSIS — Z89.512 S/P BKA (BELOW KNEE AMPUTATION), LEFT (HCC): ICD-10-CM

## 2024-11-06 DIAGNOSIS — Z86.16 HISTORY OF COVID-19: ICD-10-CM

## 2024-11-06 DIAGNOSIS — G63 POLYNEUROPATHY ASSOCIATED WITH UNDERLYING DISEASE (HCC): ICD-10-CM

## 2024-11-06 DIAGNOSIS — N40.1 BENIGN PROSTATIC HYPERPLASIA WITH URINARY RETENTION: ICD-10-CM

## 2024-11-06 DIAGNOSIS — Z74.09 IMPAIRED MOBILITY AND ADLS: ICD-10-CM

## 2024-11-06 DIAGNOSIS — Z01.89 ENCOUNTER FOR COMPETENCY EVALUATION: ICD-10-CM

## 2024-11-06 DIAGNOSIS — R33.8 BENIGN PROSTATIC HYPERPLASIA WITH URINARY RETENTION: ICD-10-CM

## 2024-11-06 DIAGNOSIS — I50.32 CHRONIC HEART FAILURE WITH PRESERVED EJECTION FRACTION (HCC): Primary | ICD-10-CM

## 2024-11-06 PROBLEM — I96 GANGRENE (HCC): Status: RESOLVED | Noted: 2023-10-23 | Resolved: 2024-11-06

## 2024-11-06 PROCEDURE — 99310 SBSQ NF CARE HIGH MDM 45: CPT | Performed by: STUDENT IN AN ORGANIZED HEALTH CARE EDUCATION/TRAINING PROGRAM

## 2024-11-06 NOTE — ASSESSMENT & PLAN NOTE
Multifactorial in setting of bilateral lower extremity amputations, obesity, physical deconditioning  -PT/OT as appropriate  -fall precautions  -encourage appropriate DME use  -SW to follow for safe discharge planning/homecare services/equipment as appropriate. He would likely benefit from equipment including a power lift chair and a mobility scooter to assist in his mobility and freedom.

## 2024-11-06 NOTE — ASSESSMENT & PLAN NOTE
"requested by staff and  to see patient for competency evaluation as part of visit for \"Medical Source Opinion of Patient's Capability to Manage Benefits\" social security paperwork  Extensive discussion with patient  Patient is Romaine 3, reasonable historian and seems quite capable in ability to comprehend financial questions and make related decisions   Patient self-reports good/intact ability to comprehend finances and to personally manage or direct management of funds   Patient reports their finances are generally managed by his wife these days but that he is certainly able to understand and participate. He feels mainly limited by physically being here in the facility and with limited physical mobility, but feels his mind is clear and sharp  SLUMS questionnaire done with patient today - patient reports having completed high school - score of 30/30 - consistent with intact cognition  Paperwork completed to the effect that patient does in fact appear to have competency to independently manage or direct his funds - paperwork given to charge nurse    "

## 2024-11-06 NOTE — ASSESSMENT & PLAN NOTE
Lab Results   Component Value Date    HGBA1C 6.7 (A) 02/02/2023       A1c 6.4 as of 2/1/24  Monitor glucose - generally well controlled in low-mid 100s, fasting sugars typically low 100s  Avoid hypoglycemia  Continue lantus, 38u BID with hold parameters, consider weaning slightly if sugars remain well controlled or become lower  Encourage CCD; Encourage lifestyle modifications including healthy diet, weight management, exercise as appropriate  Continue to monitor, check A1c periodically

## 2024-11-06 NOTE — ASSESSMENT & PLAN NOTE
"Seems to be having intermittent loose stools since he had COVID in Dec 2023  Patient feels diarrhea is stable over time, and overall improved (some recent worsening having COVID again in Aug 2024)  Denies associated GI symptoms including abdominal pain, nausea, vomiting. He does pass gas. He reports good appetite.  Recently having more formed/soft stools, not watery  Stool occult blood has been negative  Stool PCR panel has been negative  He was on reglan for gastroparesis, this has been stopped  Banana flakes were tried and reportedly ineffective  Pepto bismol was tried and reportedly ineffective although patient notes taking higher doses in the past was more helpful  Trial of linzess was ineffective  Continues on probiotic and metamucil though does not feel these have changed the diarrhea significantly  XR abdomen 3/6/24 with presence of stool but \"There is no bowel obstruction\"  Could consider further abdominal imaging however presently no acute abdominal exam findings  Patient does report hx of lactose intolerance but states he is careful and takes mainly lactose free products  Recent TSH and tTG-IgA WNL  He was evaluated by GI in office 4/18/24  Colonoscopy done recently 6/4/24, see note  Monitor BMP routinely to monitor electrolytes - recently seems stable  Continue to monitor- seem stable or improved overall  Encourage PO intake/hydration  Follow up with PCP,  GI as appropriate  "

## 2024-11-06 NOTE — PROGRESS NOTES
[facility]  Facility: [facility]    PROGRESS NOTE  Nursing Home Place of Service: nursing home place of service: POS 32 Unskilled- No Part A Coverage    NAME: Abner King  : 1960 AGE: 64 y.o. SEX: male MRN: [id number]  DATE OF ENCOUNTER: 2024    Assessment and Plan     Problem List Items Addressed This Visit       Anxiety and depression     Mood stable  Stressors include health complications, amputations which he is getting used to  Used to be on alprazolam PRN, has been weaned off as of 2024  Continue supportive care  Monitor for change in mood/behaviors          Type 2 diabetes mellitus with hyperglycemia, with long-term current use of insulin (HCC)       Lab Results   Component Value Date    HGBA1C 6.7 (A) 2023       A1c 6.4 as of 24  Monitor glucose - generally well controlled in low-mid 100s, fasting sugars typically low 100s  Avoid hypoglycemia  Continue lantus, 38u BID with hold parameters, consider weaning slightly if sugars remain well controlled or become lower  Encourage CCD; Encourage lifestyle modifications including healthy diet, weight management, exercise as appropriate  Continue to monitor, check A1c periodically         Hyperlipidemia     Continue statin  Monitor lipids periodically - appear well controlled         PAD (peripheral artery disease) (HCC)     S/p right AKA on 10/25/23  Continue aspirin, statin, plavix         S/P BKA (below knee amputation), left (HCC)     Uses prosthetic  Monitor for skin breakdown  PT/OT         Diabetic gastroparesis  (HCC) (Chronic)       Lab Results   Component Value Date    HGBA1C 6.7 (A) 2023     Hx of diabetic gastroparesis with frequent constipation in the past however recently diarrhea has been his main concern  See plan under Diarrhea         Chronic heart failure with preserved ejection fraction (HCC) - Primary     Wt Readings from Last 3 Encounters:   24 91.4 kg (201 lb 6.4 oz)   24  "91.4 kg (201 lb 6.4 oz)   06/17/24 90 kg (198 lb 8 oz)       2D echocardiogram 10/22: left ventricular ejection fraction 61%. Grade 1 diastolic dysfunction   Monitor routine weight - fairly stable recently, no alarming uptrend, some variability likely related to weighing methods  Monitor volume status clinically - appears euvolemic, no orthopnea  Continue lasix PO 40mg BID             CAD (coronary artery disease)     Noted hx of CABG  No acute cardiac complaints, seems stable  Continue aspirin, plavix, statin  Follow up with Cardiology as needed         Loose stools     Seems to be having intermittent loose stools since he had COVID in Dec 2023  Patient feels diarrhea is stable over time, and overall improved (some recent worsening having COVID again in Aug 2024)  Denies associated GI symptoms including abdominal pain, nausea, vomiting. He does pass gas. He reports good appetite.  Recently having more formed/soft stools, not watery  Stool occult blood has been negative  Stool PCR panel has been negative  He was on reglan for gastroparesis, this has been stopped  Banana flakes were tried and reportedly ineffective  Pepto bismol was tried and reportedly ineffective although patient notes taking higher doses in the past was more helpful  Trial of linzess was ineffective  Continues on probiotic and metamucil though does not feel these have changed the diarrhea significantly  XR abdomen 3/6/24 with presence of stool but \"There is no bowel obstruction\"  Could consider further abdominal imaging however presently no acute abdominal exam findings  Patient does report hx of lactose intolerance but states he is careful and takes mainly lactose free products  Recent TSH and tTG-IgA WNL  He was evaluated by GI in office 4/18/24  Colonoscopy done recently 6/4/24, see note  Monitor BMP routinely to monitor electrolytes - recently seems stable  Continue to monitor- seem stable or improved overall  Encourage PO " "intake/hydration  Follow up with PCP,  GI as appropriate         S/P AKA (above knee amputation), right (HCC)     Cleared by vascular surgery for weight bearing status  Has prosthetic  Monitor site for any breakdown in skin  PT/OT as appropriate         Gastroesophageal reflux disease without esophagitis     -stable  -continue PPI (did not tolerate recent attempt at discontinuing pantoprazole due to recurrence of symptoms)  -recommend OOB with meals, sit upright for at least 30 minutes afterwards, avoid trigger foods  -continue to monitor  -follow up with GI as appropriate         History of COVID-19     Patient had COVID in Dec 2023 with major symptoms including nausea, poor appetite, diarrhea; denied any notable associated respiratory symptoms  Was treated with paxlovid and supportive care  Feels he recovered fully from COVID except that he feels the diarrhea has been an ongoing concern since then, see plan under Diarrhea         Benign prostatic hyperplasia with urinary retention     Noted hx of BPH with hx of urinary retention per chart review  Continues on tamsulosin, finasteride  Appears stable  Continue to monitor  Last PSA was WNL in 2016; consider updated monitoring of PSA if in line with goals of care  Follow up with Urology PRN         Encounter for competency evaluation     requested by staff and  to see patient for competency evaluation as part of visit for \"Medical Source Opinion of Patient's Capability to Manage Benefits\" social security paperwork  Extensive discussion with patient  Patient is Aox 3, reasonable historian and seems quite capable in ability to comprehend financial questions and make related decisions   Patient self-reports good/intact ability to comprehend finances and to personally manage or direct management of funds   Patient reports their finances are generally managed by his wife these days but that he is certainly able to understand and participate. He feels mainly " limited by physically being here in the facility and with limited physical mobility, but feels his mind is clear and sharp  SLUMS questionnaire done with patient today - patient reports having completed high school - score of 30/30 - consistent with intact cognition  Paperwork completed to the effect that patient does in fact appear to have competency to independently manage or direct his funds - paperwork given to charge nurse           Impaired mobility and ADLs     Multifactorial in setting of bilateral lower extremity amputations, obesity, physical deconditioning  -PT/OT as appropriate  -fall precautions  -encourage appropriate DME use  -SW to follow for safe discharge planning/homecare services/equipment as appropriate. He would likely benefit from equipment including a power lift chair and a mobility scooter to assist in his mobility and freedom.           Polyneuropathy associated with underlying disease (HCC)     Chronic history of peripheral neuropathy in bilateral hands and feet. Since having lower extremity amputations the symptoms are just present in bilateral hands  Reports chronically diminished sensation/numbness to bilateral hands since many years  Reported stable perhaps very gradually progressive over time  Used to be on gabapentin but that made him confused/sleepy  At present he wants no medication for this  Continue good diabetic control                      Chief Complaint     Follow up 60 day    History of Present Illness     Abner King is a 64 y.o. male who was seen today for follow up. PMH including s/p right AKA (Oct 2023), history of left BKA, type 2 DM, peripheral vascular disease, gastroparesis, CHF, hyperlipidemia, GERD, anxiety, vitamin deficiency, and atherosclerotic heart disease     Patient seen and examined in room  Others present: none  Patient laying in bed, head elevated, able to reposition independently, working on his tablet  Appears comfortable, awake, alert, oriented to  "situation, able to converse appropriately  Patient Aox3, appears to be a fair historian, polite and in very good spirits, mentation seems stable compared to my prior visit. Notes he has been doing very well recently overall with no new/acute concerns at this time. He is intending to be discharged from facility later this month as he has been doing well with his prosthetics with therapy and his wife has gotten a ground floor apartment with ramp access he intends to move into; I have discussed this with facility team today and team will continue to follow along regarding safe discharge planning.  Feels his chronic diarrhea (issue seemed to start when he had COVID some time ago) is overall improved/stable lately; stool typically soft but not watery like it used to be; denies any abdominal pain, N/V, blood in stool, or other acute GI symptoms. He had recent colonoscopy and GI visit. Appetite good, no swallowing concerns, breathing fine, no orthopnea, no pain anywhere, no recent abdominal pain/nausea/vomiting. No recent CP/palpitations or orthostatic lightheadedness. Urinating fine independently without acute symptoms. No acute cardiopulmonary, abdominal, or urinary symptoms; see ROS for more details. He is working with therapy regarding his prosthetics, reports this has been going well and feels he is slowly making progress including being ble to ambulate up to 100-150 feet with walker recently, and his ultimate goal is to be discharged and return to community-dwelling status where he can go out and socialize more.  He notes that he has scheduled a visit to re-establish with outpatient PCP for later this month as well.     No further questions or acute concerns identified.  No acute concerns per nursing or NP.        XR KUB 3/6/24 \"There is no bowel obstruction\"  Colonoscopy 6/4/24:   \"Subcentimeter polyp in the ascending colon was removed with cold snare  Edematous mucosa in the sigmoid colon; performed cold forceps " "biopsy  Redundant folds in the sigmoid colon.  The cecum, hepatic flexure, transverse colon, splenic flexure, descending colon and rectum appeared normal.\"  Await pathology results  Repeat colonoscopy in 7 years, due: 6/3/2031  A. Polyp, Colorectal, ascending colon polyp/cold snare:      - Tubular adenoma      - No high-grade dysplasia and no evidence of malignancy    B. Large Intestine, Sigmoid Colon, sigmoid colon redundant fold 50cm bx:      - Colonic mucosa with focal surface hyperplastic change       - Negative for dysplasia or carcinoma \"       Lab Review:   Old TSH from 2020 was 4.712 (slightly high)  12/28: BMP with K 3.5, CBC with Hb 10.7  1/2: BMP with K 3.4, CBC with Hb 11  2/1: lipids WNL, A1c 6.4  2/5: stool PCR panel including C.diff negative  2/9: BMP generally stable/non-actionable, GFR >100  2/19: stool occult blood negative  3/13/24: TSH WNL; ttg iga antibody WNL  9/18/24: CBC generally stable/non-actionable; BMP generally stable/non-actionable, eGFR 96      EKG 10/25/23: NSR, 78bpm, Qtc 483  Echo 10/22/23: EF 61, grade 1 diastolic dysfunction, no notable valvular stenosis      The following portions of the patient's history were reviewed and updated as appropriate: allergies, current medications, past family history, past medical history, past social history, past surgical history and problem list.    Review of Systems     Review of Systems   Constitutional:  Negative for appetite change, chills, diaphoresis and fever.   HENT:  Negative for drooling, ear pain, hearing loss, rhinorrhea, sore throat and trouble swallowing.    Eyes:  Negative for pain, discharge, redness, itching and visual disturbance.   Respiratory:  Negative for cough, chest tightness, shortness of breath and wheezing.    Cardiovascular:  Negative for chest pain and palpitations.   Gastrointestinal:  Positive for diarrhea (stable/intermittent). Negative for abdominal pain, blood in stool, constipation, nausea and vomiting. "   Genitourinary:  Negative for difficulty urinating, dysuria, flank pain and hematuria.   Musculoskeletal:  Positive for gait problem. Negative for arthralgias and back pain.   Skin:  Negative for color change.   Neurological:  Negative for dizziness, tremors, seizures, facial asymmetry, speech difficulty, weakness and headaches.   Psychiatric/Behavioral:  Negative for agitation, behavioral problems and confusion. The patient is not nervous/anxious and is not hyperactive.        Active Problem List     Patient Active Problem List   Diagnosis    Anxiety and depression    Type 2 diabetes mellitus with hyperglycemia, with long-term current use of insulin ([facility])    Hyperlipidemia    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    Vitamin D deficiency    PAD (peripheral artery disease) ([facility])    S/P BKA (below knee amputation), left ([facility])    Orthostatic hypotension    Diabetic gastroparesis  ([facility])    Class 2 severe obesity due to excess calories with serious comorbidity and body mass index (BMI) of 35.0 to 35.9 in adult ([facility])    Triple vessel coronary artery disease    Hypertensive heart disease with congestive heart failure ([facility])    Chronic heart failure with preserved ejection fraction ([facility])    S/P CABG x 3    Diabetic autonomic neuropathy associated with type 2 diabetes mellitus ([facility])    Hyponatremia    Obstructive sleep apnea    Phantom limb syndrome without pain ([facility])    CAD (coronary artery disease)    Elephantiasis nostras verrucosa    Embolism and thrombosis of arteries of the lower extremities ([facility])    Venous stasis dermatitis of right lower extremity    Diabetic ulcer of right heel ([facility])    Loose stools    Elevated troponin    Anemia    S/P AKA (above knee amputation), right ([facility])    Advance care planning    Hypokalemia    Gastroesophageal reflux disease without esophagitis    Skin abnormality    History of COVID-19    Benign prostatic hyperplasia with urinary retention     Ambulatory dysfunction    Encounter for competency evaluation    Impaired mobility and ADLs    Polyneuropathy associated with underlying disease (HCC)       Objective     Vital Signs:     BP: 135/64 mmHg  9/1/2024 11:58 Temp:97.9 °F  9/1/2024 11:58 Pulse:68 bpm  9/1/2024 11:58 Weight:207.1 Lbs  9/7/2024 11:20   Resp:20 Breaths/min  9/1/2024 11:58 BS:150 mg/dL  9/11/2024 21:03 O2:96 %  9/1/2024 11:58 Pain:0  9/11/2024 17:15       Physical Exam  Vitals reviewed.   Constitutional:       General: He is not in acute distress.     Appearance: He is not toxic-appearing or diaphoretic.   HENT:      Head: Normocephalic and atraumatic.      Right Ear: External ear normal.      Left Ear: External ear normal.      Nose: Nose normal. No rhinorrhea.      Mouth/Throat:      Mouth: Mucous membranes are moist.      Pharynx: Oropharynx is clear. No oropharyngeal exudate or posterior oropharyngeal erythema.   Eyes:      General: No scleral icterus.        Right eye: No discharge.         Left eye: No discharge.      Extraocular Movements: Extraocular movements intact.      Conjunctiva/sclera: Conjunctivae normal.      Pupils: Pupils are equal, round, and reactive to light.   Cardiovascular:      Rate and Rhythm: Normal rate and regular rhythm.   Pulmonary:      Effort: Pulmonary effort is normal. No respiratory distress.      Breath sounds: No wheezing or rales.   Abdominal:      General: Bowel sounds are normal. There is no distension.      Palpations: Abdomen is soft.      Tenderness: There is no abdominal tenderness. There is no guarding.   Musculoskeletal:         General: No tenderness.      Cervical back: No rigidity.      Comments: S/p L BKA and R AKA, sites appear clean/dry/well healed   Skin:     General: Skin is warm and dry.      Coloration: Skin is not jaundiced.   Neurological:      General: No focal deficit present.      Mental Status: He is alert and oriented to person, place, and time. Mental status is at baseline.    Psychiatric:         Mood and Affect: Mood normal.         Behavior: Behavior normal.         Thought Content: Thought content normal.         Judgment: Judgment normal.         Pertinent Laboratory/Diagnostic Studies:  Laboratory and Imaging studies reviewed. Full report in the paper chart.     Current Medications   Medications reviewed and updated in facility chart.      -Total time spent on this encounter today including documentation and workup review, face to face time, history and exam, and documentation/orders, cognitive testing, social security paperwork discussion/completion was approximately 60 minutes.  -This note will be copied to [facility] EMR where vitals and medication orders are placed.    Arias Fox D.O.  Geriatric Medicine  11/6/2024 2:16 PM

## 2024-11-06 NOTE — ASSESSMENT & PLAN NOTE
Noted hx of CABG  No acute cardiac complaints, seems stable  Continue aspirin, plavix, statin  Follow up with Cardiology as needed

## 2024-11-06 NOTE — ASSESSMENT & PLAN NOTE
Chronic history of peripheral neuropathy in bilateral hands and feet. Since having lower extremity amputations the symptoms are just present in bilateral hands  Reports chronically diminished sensation/numbness to bilateral hands since many years  Reported stable perhaps very gradually progressive over time  Used to be on gabapentin but that made him confused/sleepy  At present he wants no medication for this  Continue good diabetic control

## 2024-11-12 NOTE — ASSESSMENT & PLAN NOTE
Patient had an elevated troponin, secondary to non-MI troponin elevation secondary to sepsis   Patient was evaluated by the cardiology team  No evidence of acute ischemia  Continue home medication regimen with aspirin, Plavix, statin head injury

## 2024-11-21 ENCOUNTER — OFFICE VISIT (OUTPATIENT)
Age: 64
End: 2024-11-21
Payer: COMMERCIAL

## 2024-11-21 VITALS
SYSTOLIC BLOOD PRESSURE: 128 MMHG | DIASTOLIC BLOOD PRESSURE: 84 MMHG | HEART RATE: 88 BPM | OXYGEN SATURATION: 99 % | TEMPERATURE: 97.9 F

## 2024-11-21 DIAGNOSIS — I25.10 CORONARY ARTERY DISEASE INVOLVING NATIVE CORONARY ARTERY OF NATIVE HEART WITHOUT ANGINA PECTORIS: ICD-10-CM

## 2024-11-21 DIAGNOSIS — R33.8 BENIGN PROSTATIC HYPERPLASIA WITH URINARY RETENTION: ICD-10-CM

## 2024-11-21 DIAGNOSIS — G63 POLYNEUROPATHY ASSOCIATED WITH UNDERLYING DISEASE (HCC): ICD-10-CM

## 2024-11-21 DIAGNOSIS — E11.40 TYPE 2 DIABETES MELLITUS WITH DIABETIC NEUROPATHY, WITH LONG-TERM CURRENT USE OF INSULIN (HCC): Primary | ICD-10-CM

## 2024-11-21 DIAGNOSIS — L97.414 NON-PRESSURE CHRONIC ULCER OF RIGHT HEEL AND MIDFOOT WITH NECROSIS OF BONE (HCC): ICD-10-CM

## 2024-11-21 DIAGNOSIS — Z79.4 TYPE 2 DIABETES MELLITUS WITH DIABETIC NEUROPATHY, WITH LONG-TERM CURRENT USE OF INSULIN (HCC): Primary | ICD-10-CM

## 2024-11-21 DIAGNOSIS — E11.65 TYPE 2 DIABETES MELLITUS WITH HYPERGLYCEMIA, WITH LONG-TERM CURRENT USE OF INSULIN (HCC): ICD-10-CM

## 2024-11-21 DIAGNOSIS — L97.412 NON-PRESSURE CHRONIC ULCER OF RIGHT HEEL AND MIDFOOT WITH FAT LAYER EXPOSED (HCC): ICD-10-CM

## 2024-11-21 DIAGNOSIS — M86.10 OTHER ACUTE OSTEOMYELITIS, UNSPECIFIED SITE (HCC): ICD-10-CM

## 2024-11-21 DIAGNOSIS — I83.019 VENOUS ULCER OF RIGHT LEG (HCC): ICD-10-CM

## 2024-11-21 DIAGNOSIS — E78.2 MIXED HYPERLIPIDEMIA: ICD-10-CM

## 2024-11-21 DIAGNOSIS — Z89.611 S/P AKA (ABOVE KNEE AMPUTATION), RIGHT (HCC): ICD-10-CM

## 2024-11-21 DIAGNOSIS — L97.919 VENOUS ULCER OF RIGHT LEG (HCC): ICD-10-CM

## 2024-11-21 DIAGNOSIS — N40.1 BENIGN PROSTATIC HYPERPLASIA WITH URINARY RETENTION: ICD-10-CM

## 2024-11-21 DIAGNOSIS — E66.01 MORBID (SEVERE) OBESITY DUE TO EXCESS CALORIES (HCC): ICD-10-CM

## 2024-11-21 DIAGNOSIS — I74.3 EMBOLISM AND THROMBOSIS OF ARTERIES OF THE LOWER EXTREMITIES (HCC): ICD-10-CM

## 2024-11-21 DIAGNOSIS — I73.9 PAD (PERIPHERAL ARTERY DISEASE) (HCC): ICD-10-CM

## 2024-11-21 DIAGNOSIS — I50.32 CHRONIC DIASTOLIC (CONGESTIVE) HEART FAILURE (HCC): ICD-10-CM

## 2024-11-21 DIAGNOSIS — Z79.4 TYPE 2 DIABETES MELLITUS WITH HYPERGLYCEMIA, WITH LONG-TERM CURRENT USE OF INSULIN (HCC): ICD-10-CM

## 2024-11-21 DIAGNOSIS — Z89.512 S/P BKA (BELOW KNEE AMPUTATION), LEFT (HCC): ICD-10-CM

## 2024-11-21 DIAGNOSIS — I25.10 TRIPLE VESSEL CORONARY ARTERY DISEASE: ICD-10-CM

## 2024-11-21 DIAGNOSIS — I50.33 ACUTE ON CHRONIC DIASTOLIC (CONGESTIVE) HEART FAILURE (HCC): ICD-10-CM

## 2024-11-21 LAB — SL AMB POCT HEMOGLOBIN AIC: 7.7 (ref ?–6.5)

## 2024-11-21 PROCEDURE — 99214 OFFICE O/P EST MOD 30 MIN: CPT | Performed by: FAMILY MEDICINE

## 2024-11-21 PROCEDURE — 83036 HEMOGLOBIN GLYCOSYLATED A1C: CPT | Performed by: FAMILY MEDICINE

## 2024-11-21 RX ORDER — KETOROLAC TROMETHAMINE 30 MG/ML
1 INJECTION, SOLUTION INTRAMUSCULAR; INTRAVENOUS ONCE
Qty: 6 EACH | Refills: 3 | Status: SHIPPED | OUTPATIENT
Start: 2024-11-21 | End: 2024-11-21

## 2024-11-21 NOTE — PROGRESS NOTES
Assessment/Plan: A1c 7.7.  Other labs and records reviewed at present time.  Patient will increase Lantus from 38 units twice daily to 40 units twice daily and will modify diet regarding diabetes which is uncontrolled at present time.  CAD stable.  Hypertension stable.  Patient status post AKA right leg and BKA left leg.  Continue to check legs for breakdown.  Patient will need to get electric wheelchair given decreased ability to maneuver mechanical wheelchair.  Patient will also need lift chair as well as medical emergency alert bracelet.  Handicap placard given at this time.  Patient will continue to check blood sugars per routine and freestyle ashlie readers/sensors given at this time.  Other medications will be refilled per routine.  Follow-up in 3 to 6 months.     Diagnoses and all orders for this visit:    Type 2 diabetes mellitus with diabetic neuropathy, with long-term current use of insulin ([facility])  -     POCT hemoglobin A1c  -     Continuous Glucose Sensor (FreeStyle Ashlie 2 Sensor) MISC; Apply 1 Application topically every 14 (fourteen) days  -     Continuous Glucose  (FreeStyle Ashlie 3 [place]) PARDEEP; Use 1 each once for 1 dose    Type 2 diabetes mellitus with hyperglycemia, with long-term current use of insulin ([facility])    Coronary artery disease involving native coronary artery of native heart without angina pectoris    PAD (peripheral artery disease) ([facility])    Polyneuropathy associated with underlying disease (HCC)    S/P BKA (below knee amputation), left ([facility])    S/P AKA (above knee amputation), right (HCC)    Mixed hyperlipidemia    Benign prostatic hyperplasia with urinary retention    Non-pressure chronic ulcer of right heel and midfoot with necrosis of bone (HCC)    Acute on chronic diastolic (congestive) heart failure (HCC)    Non-pressure chronic ulcer of right heel and midfoot with fat layer exposed (HCC)    Embolism and thrombosis of arteries of the lower extremities (HCC)    Venous ulcer of  right leg (HCC)    Chronic diastolic (congestive) heart failure (HCC)    Morbid (severe) obesity due to excess calories (HCC)    Other acute osteomyelitis, unspecified site (HCC)    Triple vessel coronary artery disease    Other orders  -     Continuous Glucose  (FreeStyle Ashlie 2 [place]) PARDEEP  -     Discontinue: Continuous Glucose Sensor (FreeStyle Ashlie 2 Sensor) MISC            Subjective:        Patient ID: Abner King is a 64 y.o. male.      Patient is here to follow-up on diabetes CAD peripheral vascular disease hypertension hyperlipidemia.  Patient status post being in [facility] for the past year.  Patient with history of AKA right leg and BKA left leg.  Patient with history of polyneuropathy with decreased sensation and movement of hands.  No leg related issues at present time related to amputations.  No other new chest pain or shortness of breath presently.  Patient with chronic visual disturbance.  Patient with pain in bilateral hands.  Patient having difficulty using wheelchair due to numbness and pain in hands.          The following portions of the patient's history were reviewed and updated as appropriate: allergies, current medications, past family history, past medical history, past social history, past surgical history and problem list.      Review of Systems   Constitutional:  Positive for activity change.   HENT: Negative.     Eyes:  Positive for visual disturbance.   Respiratory: Negative.     Cardiovascular: Negative.    Gastrointestinal: Negative.    Endocrine: Negative.    Genitourinary: Negative.    Musculoskeletal:  Positive for arthralgias and gait problem.   Skin: Negative.    Allergic/Immunologic: Negative.    Neurological:  Positive for numbness.   Hematological: Negative.    Psychiatric/Behavioral: Negative.             Objective:        Depression Screening and Follow-up Plan: Patient was screened for depression during today's encounter. They screened negative  with a PHQ-9 score of 0.            /84 (BP Location: Right arm, Patient Position: Sitting, Cuff Size: Large)   Pulse 88   Temp 97.9 °F (36.6 °C) (Temporal)   SpO2 99%          Physical Exam  Vitals and nursing note reviewed.   Constitutional:       General: He is not in acute distress.     Appearance: He is not ill-appearing, toxic-appearing or diaphoretic.      Comments: Patient in wheelchair.   HENT:      Head: Normocephalic and atraumatic.      Right Ear: Tympanic membrane, ear canal and external ear normal. There is no impacted cerumen.      Left Ear: Tympanic membrane, ear canal and external ear normal. There is no impacted cerumen.      Nose: Nose normal. No congestion or rhinorrhea.      Mouth/Throat:      Mouth: Mucous membranes are moist.      Pharynx: No oropharyngeal exudate or posterior oropharyngeal erythema.   Eyes:      General: No scleral icterus.        Right eye: No discharge.         Left eye: No discharge.   Cardiovascular:      Rate and Rhythm: Normal rate and regular rhythm.      Pulses: Normal pulses.      Heart sounds: Normal heart sounds. No murmur heard.     No friction rub. No gallop.   Pulmonary:      Effort: Pulmonary effort is normal. No respiratory distress.      Breath sounds: Normal breath sounds. No stridor. No wheezing, rhonchi or rales.   Chest:      Chest wall: No tenderness.   Musculoskeletal:         General: Tenderness and deformity present. No swelling or signs of injury.      Cervical back: Normal range of motion and neck supple. No rigidity. No muscular tenderness.      Right lower leg: No edema.      Left lower leg: No edema.      Comments: Right AKA, left BKA.  Patient with deformity of hands with decreased range of motion due to arthritis as well as decreased sensation   Lymphadenopathy:      Cervical: No cervical adenopathy.   Skin:     General: Skin is warm and dry.      Capillary Refill: Capillary refill takes less than 2 seconds.      Coloration: Skin is  not jaundiced.      Findings: No bruising, erythema, lesion or rash.   Neurological:      Mental Status: He is alert and oriented to person, place, and time.      Sensory: Sensory deficit present.      Motor: Weakness present.      Comments: Decreased sensation bilateral hands bilaterally   Psychiatric:         Mood and Affect: Mood normal.         Behavior: Behavior normal.         Thought Content: Thought content normal.         Judgment: Judgment normal.

## 2024-11-25 ENCOUNTER — TELEPHONE (OUTPATIENT)
Age: 64
End: 2024-11-25

## 2024-11-25 ENCOUNTER — NURSING HOME VISIT (OUTPATIENT)
Dept: GERIATRICS | Facility: OTHER | Age: 64
End: 2024-11-25
Payer: COMMERCIAL

## 2024-11-25 DIAGNOSIS — Z89.512 S/P BKA (BELOW KNEE AMPUTATION), LEFT (HCC): ICD-10-CM

## 2024-11-25 DIAGNOSIS — E11.65 TYPE 2 DIABETES MELLITUS WITH HYPERGLYCEMIA, WITH LONG-TERM CURRENT USE OF INSULIN (HCC): ICD-10-CM

## 2024-11-25 DIAGNOSIS — E11.43 DIABETIC GASTROPARESIS  (HCC): Chronic | ICD-10-CM

## 2024-11-25 DIAGNOSIS — K31.84 DIABETIC GASTROPARESIS  (HCC): Chronic | ICD-10-CM

## 2024-11-25 DIAGNOSIS — F32.A ANXIETY AND DEPRESSION: ICD-10-CM

## 2024-11-25 DIAGNOSIS — Z78.9 IMPAIRED MOBILITY AND ADLS: ICD-10-CM

## 2024-11-25 DIAGNOSIS — E78.2 MIXED HYPERLIPIDEMIA: ICD-10-CM

## 2024-11-25 DIAGNOSIS — E11.40 TYPE 2 DIABETES MELLITUS WITH DIABETIC NEUROPATHY, WITH LONG-TERM CURRENT USE OF INSULIN (HCC): ICD-10-CM

## 2024-11-25 DIAGNOSIS — F41.9 ANXIETY AND DEPRESSION: ICD-10-CM

## 2024-11-25 DIAGNOSIS — Z95.1 S/P CABG (CORONARY ARTERY BYPASS GRAFT): ICD-10-CM

## 2024-11-25 DIAGNOSIS — I73.9 PAD (PERIPHERAL ARTERY DISEASE) (HCC): Primary | ICD-10-CM

## 2024-11-25 DIAGNOSIS — I25.10 CORONARY ARTERY DISEASE INVOLVING NATIVE CORONARY ARTERY OF NATIVE HEART WITHOUT ANGINA PECTORIS: ICD-10-CM

## 2024-11-25 DIAGNOSIS — R19.5 LOOSE STOOLS: ICD-10-CM

## 2024-11-25 DIAGNOSIS — Z89.611 S/P AKA (ABOVE KNEE AMPUTATION), RIGHT (HCC): ICD-10-CM

## 2024-11-25 DIAGNOSIS — K21.9 GASTROESOPHAGEAL REFLUX DISEASE WITHOUT ESOPHAGITIS: ICD-10-CM

## 2024-11-25 DIAGNOSIS — Z79.4 TYPE 2 DIABETES MELLITUS WITH HYPERGLYCEMIA, WITH LONG-TERM CURRENT USE OF INSULIN (HCC): ICD-10-CM

## 2024-11-25 DIAGNOSIS — G63 POLYNEUROPATHY ASSOCIATED WITH UNDERLYING DISEASE (HCC): ICD-10-CM

## 2024-11-25 DIAGNOSIS — I50.22 CHRONIC SYSTOLIC CONGESTIVE HEART FAILURE (HCC): ICD-10-CM

## 2024-11-25 DIAGNOSIS — R33.9 URINARY RETENTION: ICD-10-CM

## 2024-11-25 DIAGNOSIS — N40.1 BENIGN PROSTATIC HYPERPLASIA WITH URINARY RETENTION: ICD-10-CM

## 2024-11-25 DIAGNOSIS — R26.2 AMBULATORY DYSFUNCTION: ICD-10-CM

## 2024-11-25 DIAGNOSIS — Z79.4 TYPE 2 DIABETES MELLITUS WITH DIABETIC NEUROPATHY, WITH LONG-TERM CURRENT USE OF INSULIN (HCC): ICD-10-CM

## 2024-11-25 DIAGNOSIS — R33.8 BENIGN PROSTATIC HYPERPLASIA WITH URINARY RETENTION: ICD-10-CM

## 2024-11-25 DIAGNOSIS — Z74.09 IMPAIRED MOBILITY AND ADLS: ICD-10-CM

## 2024-11-25 DIAGNOSIS — Z86.16 HISTORY OF COVID-19: ICD-10-CM

## 2024-11-25 DIAGNOSIS — G63 POLYNEUROPATHY ASSOCIATED WITH UNDERLYING DISEASE (HCC): Primary | ICD-10-CM

## 2024-11-25 DIAGNOSIS — I50.32 CHRONIC HEART FAILURE WITH PRESERVED EJECTION FRACTION (HCC): ICD-10-CM

## 2024-11-25 PROCEDURE — 99316 NF DSCHRG MGMT 30 MIN+: CPT | Performed by: STUDENT IN AN ORGANIZED HEALTH CARE EDUCATION/TRAINING PROGRAM

## 2024-11-25 RX ORDER — ATORVASTATIN CALCIUM 80 MG/1
80 TABLET, FILM COATED ORAL DAILY
Qty: 30 TABLET | Refills: 0 | Status: SHIPPED | OUTPATIENT
Start: 2024-11-25

## 2024-11-25 RX ORDER — TAMSULOSIN HYDROCHLORIDE 0.4 MG/1
0.4 CAPSULE ORAL
Qty: 30 CAPSULE | Refills: 0 | Status: SHIPPED | OUTPATIENT
Start: 2024-11-25

## 2024-11-25 RX ORDER — SACCHAROMYCES BOULARDII 250 MG
250 CAPSULE ORAL DAILY
Qty: 30 CAPSULE | Refills: 0 | Status: SHIPPED | OUTPATIENT
Start: 2024-11-25

## 2024-11-25 RX ORDER — ASPIRIN 81 MG/1
81 TABLET ORAL DAILY
Qty: 30 TABLET | Refills: 0 | Status: SHIPPED | OUTPATIENT
Start: 2024-11-25

## 2024-11-25 RX ORDER — CLOPIDOGREL BISULFATE 75 MG/1
75 TABLET ORAL DAILY
Qty: 30 TABLET | Refills: 0 | Status: SHIPPED | OUTPATIENT
Start: 2024-11-25

## 2024-11-25 RX ORDER — FINASTERIDE 5 MG/1
5 TABLET, FILM COATED ORAL DAILY
Qty: 30 TABLET | Refills: 0 | Status: SHIPPED | OUTPATIENT
Start: 2024-11-25

## 2024-11-25 RX ORDER — FUROSEMIDE 40 MG/1
40 TABLET ORAL 2 TIMES DAILY
Qty: 200 TABLET | Refills: 2 | Status: SHIPPED | OUTPATIENT
Start: 2024-11-25

## 2024-11-25 RX ORDER — INSULIN GLARGINE 100 [IU]/ML
38 INJECTION, SOLUTION SUBCUTANEOUS 2 TIMES DAILY
Qty: 15 ML | Refills: 0 | Status: SHIPPED | OUTPATIENT
Start: 2024-11-25

## 2024-11-25 RX ORDER — PANTOPRAZOLE SODIUM 20 MG/1
20 TABLET, DELAYED RELEASE ORAL DAILY
Qty: 30 TABLET | Refills: 0 | Status: SHIPPED | OUTPATIENT
Start: 2024-11-25

## 2024-11-25 NOTE — ASSESSMENT & PLAN NOTE
Wt Readings from Last 3 Encounters:   08/27/24 91.4 kg (201 lb 6.4 oz)   08/13/24 91.4 kg (201 lb 6.4 oz)   06/17/24 90 kg (198 lb 8 oz)       2D echocardiogram 10/22: left ventricular ejection fraction 61%. Grade 1 diastolic dysfunction   No acute cardiac complaints  Monitor routine weight - fairly stable recently, no alarming uptrend, some variability likely related to weighing methods  Monitor volume status clinically - appears euvolemic, no orthopnea  Continue lasix PO 40mg BID  Follow up with PCP, Cardiology as appropriate

## 2024-11-25 NOTE — ASSESSMENT & PLAN NOTE
Lab Results   Component Value Date    HGBA1C 7.7 (A) 11/21/2024     Hx of diabetic gastroparesis with frequent constipation in the past however more recently longstanding/stable/intermittent diarrhea has been his main concern  See plan under Diarrhea

## 2024-11-25 NOTE — ASSESSMENT & PLAN NOTE
Chronic history of peripheral neuropathy in bilateral hands and feet. Since having lower extremity amputations the symptoms are just present in bilateral hands  Reports chronically diminished sensation/numbness to bilateral hands since many years  Reported stable perhaps very gradually progressive over time  Contributes to impaired mobility/ADLs  Used to be on gabapentin but that made him confused/sleepy  At present he wants no medication for this  Continue good diabetic control

## 2024-11-25 NOTE — PROGRESS NOTES
[facility]  Facility: [facility]    DISCHARGE SUMMARY  Nursing Home Place of Service: 32: [facility]- Long Term    NAME: Abner King  : 1960 AGE: 64 y.o. SEX: male MRN: [id number]  DATE OF ENCOUNTER: 2024    DATE OF ADMISSION: 2023 DATE OF DISCHARGE:planned 24 DISCHARGE DISPOSITION: stable, to private apartment with wife    HPI: PMH including s/p right AKA (Oct 2023), history of left BKA, type 2 DM, peripheral vascular disease, gastroparesis, CHF, hyperlipidemia, GERD, anxiety, vitamin deficiency, and atherosclerotic heart disease     Reason for admission/Course of Stay: Patient was admitted from [facility] in  after hospitalization for gangrene/bacteremia. He has ultimately undergone bilateral lower extremity amputations. He has done well overall with therapy at [facility] and gotten used to his prosthetics. He has been clear about his goal to ultimately be discharged to live with his wife and participate in community/socialization again.      Patient observed self-propelling in wheelchair around unit this morning.  Patient seen and examined in room  Others present: none  Patient seated in WC, able to reposition independently, working on his tablet  Appears comfortable, awake, alert, oriented to situation, able to converse appropriately  Patient Aox3, appears to be a reasonable historian, polite and in very good spirits, mentation seems stable compared to my prior visit. Notes he has been doing very well recently overall with no new/acute concerns at this time. He feels happy and safe discharging from facility tomorrow. Feels has been doing well with his prosthetics with therapy. His wife has gotten a ground floor apartment with ramp access he intends to move into. He has already seen/re-established with his outpatient PCP last week.  Feels his chronic diarrhea (issue seemed to start when he had COVID some time ago) is overall improved/stable lately; stool  "typically soft but not watery like it used to be; last BM 2 days ago soft but formed; denies any abdominal pain, N/V, blood in stool, or other acute GI symptoms. He had fairly recent colonoscopy and GI visit. Appetite good, no swallowing concerns, breathing fine, no orthopnea, no pain anywhere, no recent abdominal pain/nausea/vomiting. No recent CP/palpitations or orthostatic lightheadedness. Urinating fine independently without acute symptoms. No acute cardiopulmonary, abdominal, or urinary symptoms; see ROS for more details. Reiterates goal to return to community-dwelling status where he can go out and socialize more.       No further questions or acute concerns identified.  No acute concerns per nursing or SW. Case discussed with nursing and SW Haritha. Hard script provided to SW for homecare services.           XR KUB 3/6/24 \"There is no bowel obstruction\"  Colonoscopy 6/4/24:   \"Subcentimeter polyp in the ascending colon was removed with cold snare  Edematous mucosa in the sigmoid colon; performed cold forceps biopsy  Redundant folds in the sigmoid colon.  The cecum, hepatic flexure, transverse colon, splenic flexure, descending colon and rectum appeared normal.\"  Await pathology results  Repeat colonoscopy in 7 years, due: 6/3/2031  A. Polyp, Colorectal, ascending colon polyp/cold snare:      - Tubular adenoma      - No high-grade dysplasia and no evidence of malignancy    B. Large Intestine, Sigmoid Colon, sigmoid colon redundant fold 50cm bx:      - Colonic mucosa with focal surface hyperplastic change       - Negative for dysplasia or carcinoma \"        Lab Review:   Old TSH from 2020 was 4.712 (slightly high)  12/28: BMP with K 3.5, CBC with Hb 10.7  1/2: BMP with K 3.4, CBC with Hb 11  2/1: lipids WNL, A1c 6.4  2/5: stool PCR panel including C.diff negative  2/9: BMP generally stable/non-actionable, GFR >100  2/19: stool occult blood negative  3/13/24: TSH WNL; ttg iga antibody WNL  9/18/24: CBC generally " stable/non-actionable; BMP generally stable/non-actionable, eGFR 96        EKG 10/25/23: NSR, 78bpm, Qtc 483  Echo 10/22/23: EF 61, grade 1 diastolic dysfunction, no notable valvular stenosis         Assessment/Plan:    CAD (coronary artery disease)  Noted hx of CABG  No acute cardiac complaints, seems stable  Continue aspirin, plavix, statin  Follow up with Cardiology as needed    Chronic heart failure with preserved ejection fraction ([facility])  Wt Readings from Last 3 Encounters:   08/27/24 91.4 kg (201 lb 6.4 oz)   08/13/24 91.4 kg (201 lb 6.4 oz)   06/17/24 90 kg (198 lb 8 oz)       2D echocardiogram 10/22: left ventricular ejection fraction 61%. Grade 1 diastolic dysfunction   No acute cardiac complaints  Monitor routine weight - fairly stable recently, no alarming uptrend, some variability likely related to weighing methods  Monitor volume status clinically - appears euvolemic, no orthopnea  Continue lasix PO 40mg BID  Follow up with PCP, Cardiology as appropriate    PAD (peripheral artery disease) ([facility])  S/p right AKA on 10/25/23  Continue aspirin, statin, plavix    Diabetic gastroparesis     Lab Results   Component Value Date    HGBA1C 7.7 (A) 11/21/2024     Hx of diabetic gastroparesis with frequent constipation in the past however more recently longstanding/stable/intermittent diarrhea has been his main concern  See plan under Diarrhea    Gastroesophageal reflux disease without esophagitis  -stable  -continue PPI (did not tolerate recent attempt at discontinuing pantoprazole due to recurrence of symptoms)  -recommend OOB with meals, sit upright for at least 30 minutes afterwards, avoid trigger foods  -continue to monitor  -follow up with GI as appropriate    Type 2 diabetes mellitus with hyperglycemia, with long-term current use of insulin ([facility])    Lab Results   Component Value Date    HGBA1C 7.7 (A) 11/21/2024       A1c 6.4 as of 2/1/24  Monitor glucose - generally well controlled in low-mid 100s, fasting  "sugars typically low 100s but somewhat variable  Avoid hypoglycemia  Continue lantus, 38u BID with hold parameters, consider weaning slightly if sugars remain well controlled or become lower  Encourage CCD; Encourage lifestyle modifications including healthy diet, weight management, exercise as appropriate  Continue to monitor, check A1c periodically  Follow up with PCP, Endocrine/Ophthalmology/Podiatry outpatient as appropriate      Loose stools  Seems to be having intermittent loose stools since he had COVID in Dec 2023  Patient feels diarrhea is stable over time, and overall improved (some recent worsening having COVID again in Aug 2024)  Denies associated GI symptoms including abdominal pain, nausea, vomiting. He does pass gas. He reports good appetite.  Recently having more formed/soft stools, not watery  Stool occult blood has been negative  Stool PCR panel has been negative  He was on reglan for gastroparesis, this has been stopped  Banana flakes were tried and reportedly ineffective  Pepto bismol was tried and reportedly ineffective although patient notes taking higher doses in the past was more helpful  Trial of linzess was ineffective  Continues on probiotic and metamucil though does not feel these have changed the diarrhea significantly  XR abdomen 3/6/24 with presence of stool but \"There is no bowel obstruction\"  Could consider further abdominal imaging however presently no acute abdominal exam findings  Patient does report hx of lactose intolerance but states he is careful and takes mainly lactose free products  Recent TSH and tTG-IgA WNL  He was evaluated by GI in office 4/18/24  Colonoscopy done recently 6/4/24, see note  Monitor BMP routinely to monitor electrolytes - recently seems stable  Continue to monitor- seem stable or improved overall  Encourage PO intake/hydration  Follow up with PCP,  GI as appropriate    Hyperlipidemia  Continue statin  Monitor lipids periodically - appear well " controlled    History of COVID-19  Patient had COVID in Dec 2023 with major symptoms including nausea, poor appetite, diarrhea; denied any notable associated respiratory symptoms  Was treated with paxlovid and supportive care  Feels he recovered fully from COVID except that he feels the diarrhea has been an ongoing concern since then, see plan under Diarrhea    S/P AKA (above knee amputation), right (HCC)  Cleared by vascular surgery for weight bearing status  Has prosthetic  Monitor site for any breakdown in skin  PT/OT as appropriate    S/P BKA (below knee amputation), left (HCC)  Uses prosthetic  Monitor for skin breakdown  PT/OT    Impaired mobility and ADLs  Multifactorial in setting of bilateral lower extremity amputations, obesity, physical deconditioning  -PT/OT as appropriate  -fall precautions  -encourage appropriate DME use  -SW to follow for safe discharge planning/homecare services/equipment as appropriate. He would likely benefit from equipment including a power lift chair and a mobility scooter to assist in his mobility and freedom. He reports he is following with outpatient PCP regarding this.    Anxiety and depression  Mood stable/good  Stressors include health complications, amputations which he is getting used to  Used to be on alprazolam PRN, has been weaned off as of April 2024  Continue supportive care  Monitor for change in mood/behaviors     Benign prostatic hyperplasia with urinary retention  Noted hx of BPH with hx of urinary retention per chart review  Continues on tamsulosin, finasteride  Appears stable  Continue to monitor  Last PSA was WNL in 2016; consider updated monitoring of PSA if in line with goals of care  Follow up with Urology PRN    Polyneuropathy associated with underlying disease (HCC)  Chronic history of peripheral neuropathy in bilateral hands and feet. Since having lower extremity amputations the symptoms are just present in bilateral hands  Reports chronically diminished  sensation/numbness to bilateral hands since many years  Reported stable perhaps very gradually progressive over time  Contributes to impaired mobility/ADLs  Used to be on gabapentin but that made him confused/sleepy  At present he wants no medication for this  Continue good diabetic control           Review of Systems   Constitutional:  Negative for appetite change, chills, diaphoresis and fever.   HENT:  Negative for drooling, ear pain, hearing loss, rhinorrhea, sore throat and trouble swallowing.    Eyes:  Negative for pain, discharge, redness, itching and visual disturbance.   Respiratory:  Negative for cough, chest tightness, shortness of breath and wheezing.    Cardiovascular:  Negative for chest pain and palpitations.   Gastrointestinal:  Positive for diarrhea (stable/intermittent). Negative for abdominal pain, blood in stool, constipation, nausea and vomiting.   Genitourinary:  Negative for difficulty urinating, dysuria, flank pain and hematuria.   Musculoskeletal:  Positive for gait problem. Negative for arthralgias, back pain and neck pain.   Skin:  Negative for color change.   Neurological:  Negative for dizziness, tremors, seizures, facial asymmetry, speech difficulty, weakness and headaches.   Psychiatric/Behavioral:  Negative for agitation, behavioral problems and confusion. The patient is not nervous/anxious and is not hyperactive.        Vital Signs:     BP: 121/64 mmHg  10/22/2024 13:53   Temp:97.7 °F  10/22/2024 13:53 Pulse:68 bpm  10/22/2024 13:53 Weight:211.3 Lbs  11/1/2024 21:42   Resp:17 Breaths/min  10/22/2024 13:53 BS:205 mg/dL  11/25/2024 09:43 O2:98 %  10/22/2024 13:53 Pain:0  11/25/2024 07:37       Exam:     Physical Exam  Vitals reviewed.   Constitutional:       General: He is not in acute distress.     Appearance: He is not toxic-appearing or diaphoretic.   HENT:      Head: Normocephalic and atraumatic.      Right Ear: External ear normal.      Left Ear: External ear normal.      Nose:  Nose normal. No rhinorrhea.      Mouth/Throat:      Mouth: Mucous membranes are moist.      Pharynx: Oropharynx is clear. No oropharyngeal exudate or posterior oropharyngeal erythema.   Eyes:      General: No scleral icterus.        Right eye: No discharge.         Left eye: No discharge.      Extraocular Movements: Extraocular movements intact.      Conjunctiva/sclera: Conjunctivae normal.      Pupils: Pupils are equal, round, and reactive to light.   Cardiovascular:      Rate and Rhythm: Normal rate and regular rhythm.   Pulmonary:      Effort: Pulmonary effort is normal. No respiratory distress.      Breath sounds: No wheezing or rales.   Abdominal:      General: Bowel sounds are normal. There is no distension.      Palpations: Abdomen is soft.      Tenderness: There is no abdominal tenderness. There is no guarding.   Musculoskeletal:         General: No tenderness.      Cervical back: No rigidity.      Comments: S/p L BKA and R AKA, sites appear clean/dry/well healed   Skin:     General: Skin is warm and dry.      Coloration: Skin is not jaundiced.   Neurological:      General: No focal deficit present.      Mental Status: He is alert and oriented to person, place, and time. Mental status is at baseline.   Psychiatric:         Mood and Affect: Mood normal.         Behavior: Behavior normal.         Thought Content: Thought content normal.         Judgment: Judgment normal.           Discharge Medications: See discharge medication list which was reviewed and signed.    Status at time of discharge: Stable    Discussion with patient/family as appropriate and further instructions:  -Fall precautions  -Aspiration precautions  -Bleeding precautions  -Monitor for signs/symptoms of infection  -Medication list was reviewed and signed  -DME form was completed    Follow-up Recommendations: Please follow-up with your primary care physician within 7-10 days of discharge to review medication changes and current status.      Problem List Follow-up Recommendations:      -Total time spent on this encounter today including documentation and workup review, face to face time, history and exam, and documentation/orders was approximately 60 minutes.  -This note will be copied to [facility] EMR where vitals and medication orders are placed.    Arias Fox D.O.  Geriatric Medicine  11/25/2024 3:58 PM

## 2024-11-25 NOTE — ASSESSMENT & PLAN NOTE
Multifactorial in setting of bilateral lower extremity amputations, obesity, physical deconditioning  -PT/OT as appropriate  -fall precautions  -encourage appropriate DME use  -SW to follow for safe discharge planning/homecare services/equipment as appropriate. He would likely benefit from equipment including a power lift chair and a mobility scooter to assist in his mobility and freedom. He reports he is following with outpatient PCP regarding this.   Pt contacted,    Pt stated that she has been having BM's since this AM. This has been non stop for her. Pt's stool has been yellow and liquid.   Pt took imodium 2 tablets at 6:30am and another tablet at 7:15am which has not helped.  Pt denies stomach pains but does report cramping generalized in the abdomen which is relieved after having a BM.   Pt feels like she has had lots of stomach acid, she has taken OTC antacids which is not helping.    Pt has not had anything to eat today but last night had popcorn and a small sandwich and wantons.     Pt has been drinking Gatorade and minutes later she is running to the bathroom.    Denies the following:    Vomiting  Nausea  Bloody stool  Black stools  Eating anything out of the ordinary  Fever  Recent antibiotics  Taking new medications    LOV with PCP 3/9/16    Pt is currently at work and is going to leave work at this time to go home. Please advise. Thank you

## 2024-11-25 NOTE — ASSESSMENT & PLAN NOTE
Mood stable/good  Stressors include health complications, amputations which he is getting used to  Used to be on alprazolam PRN, has been weaned off as of April 2024  Continue supportive care  Monitor for change in mood/behaviors

## 2024-11-25 NOTE — ASSESSMENT & PLAN NOTE
Lab Results   Component Value Date    HGBA1C 7.7 (A) 11/21/2024       A1c 6.4 as of 2/1/24  Monitor glucose - generally well controlled in low-mid 100s, fasting sugars typically low 100s but somewhat variable  Avoid hypoglycemia  Continue lantus, 38u BID with hold parameters, consider weaning slightly if sugars remain well controlled or become lower  Encourage CCD; Encourage lifestyle modifications including healthy diet, weight management, exercise as appropriate  Continue to monitor, check A1c periodically  Follow up with PCP, Endocrine/Ophthalmology/Podiatry outpatient as appropriate

## 2024-11-29 NOTE — TELEPHONE ENCOUNTER
L/m for patient to contacting office to give us info as to where we need to send the DME supply scripts to so that his insurance can send a denial.

## 2024-11-29 NOTE — TELEPHONE ENCOUNTER
"Patient called to advice that he spent an hour on the phone with insurance company and \"got nowhere\". He stated that request should probably go through DME company. Patient advised he has been \"[place]\" [place] [facility] [place] a [place] that he disputed, so to please use a different company. Patient would like a call back regarding progress of request.  "

## 2024-11-29 NOTE — TELEPHONE ENCOUNTER
Patient called inquiring about the status of the DME orders for electric wheelchair, lift chair, and life alert and where they were sent to. His  told him he may need an authorization for the orders and a copy of the denial letter if it is denied. The  will send the denial letter to Medicaid for coverage. Please call patient to advise.

## 2024-11-29 NOTE — TELEPHONE ENCOUNTER
Patient called back confused to why is DME request was DENIED by his insurance and message left.    Patient not sure on to where his DME needs to be sent to and if Insurance will cover.

## 2024-11-29 NOTE — TELEPHONE ENCOUNTER
Contacted patient, advised him we did not receive a denial letter yet from insurance because we have not submitted the scripts to his insurance yet, I was trying to get info to send scripts directly to the insurance so that I dont have to send it to the DME company. Patient is going to reach out to his insurance and see where these scripts can be faxed to and If we have to submit it to a DME company to get the denial. Patient will call back with info.

## 2024-12-02 NOTE — TELEPHONE ENCOUNTER
Contacted patient insurance to find out where I can send his DME supplies to what companies they work with and I waited on hold for 25 minutes and got transferred back to do everything all over again and than waited another 10 minutes and no one had an answer for me, they wanted to me to log on to [email or web address] and than she was trying to find out where to go next and phone call dropped, tried contacting the number again and would give all info and than would get no one on the other end. Not sure where I can send these scripts as the patient states he had an issue with [employer] before and not sure who will provide him with coverage for the medical emergency alert necklace.

## 2024-12-03 ENCOUNTER — TELEPHONE (OUTPATIENT)
Age: 64
End: 2024-12-03

## 2024-12-03 NOTE — TELEPHONE ENCOUNTER
Pt called and said he just missed a call from someone at the office. I called in and was told no one had called him. Also, if there is any update to the status of the DME, please let him know.    Please advise and f/u with pt as necessary

## 2024-12-03 NOTE — TELEPHONE ENCOUNTER
Called back out to patient and advised him that Stephanie-  reached out to him early today to try and get some more info. Patient was advised that I was on the phone with his insurance for over 40 minutes yesterday and got no info as to where I am suppose to send these DME order to. Patient was also advised we need his secondary insurance info so we can put that in his chart because we only have his [facility] medicare. Patient aware and will reach out to his  to get the medicaid info to us.

## 2024-12-03 NOTE — TELEPHONE ENCOUNTER
Patient called the office back regarding the status of his DME and wants to talk to Aida. Call warm transferred to the office for assistance.

## 2024-12-04 NOTE — TELEPHONE ENCOUNTER
Abner called back wanting to speak to SHRADDHA. I tried to warm transfer but there was no answer.     Pt would like a call back but states that he would like to be called on his phone, not his wife's phone. His number is [phone]

## 2024-12-04 NOTE — TELEPHONE ENCOUNTER
Patient states he spoke with  from medicaid today, she stated she will reach out to us to give us info as to were the scripts should go so they can get the ball rolling on what wheel chair will be covered for him or best suit him. Patient did thank us for all our help so far but we will be waiting on info from his medicaid .

## 2024-12-13 ENCOUNTER — TELEPHONE (OUTPATIENT)
Age: 64
End: 2024-12-13

## 2024-12-13 NOTE — TELEPHONE ENCOUNTER
Pt would like to start receiving all of his medications through [facility] at a 90 day supply .    Please advise pt if this is possible for his next refill date

## 2024-12-26 DIAGNOSIS — Z79.4 TYPE 2 DIABETES MELLITUS WITH DIABETIC NEUROPATHY, WITH LONG-TERM CURRENT USE OF INSULIN (HCC): ICD-10-CM

## 2024-12-26 DIAGNOSIS — E11.40 TYPE 2 DIABETES MELLITUS WITH DIABETIC NEUROPATHY, WITH LONG-TERM CURRENT USE OF INSULIN (HCC): ICD-10-CM

## 2024-12-26 RX ORDER — INSULIN GLARGINE 100 [IU]/ML
38 INJECTION, SOLUTION SUBCUTANEOUS 2 TIMES DAILY
Qty: 15 ML | Refills: 0 | Status: SHIPPED | OUTPATIENT
Start: 2024-12-26

## 2024-12-26 NOTE — TELEPHONE ENCOUNTER
Pt called, is requesting a return call today 12/26.  The patient is concerned because the insulin he uses will not arrive from [facility] mail order until January 3rd.  The patient just realized he only has one dose remaining.     Would like to know if the office has any samples of the insulin he uses, or something similar, enough to hold him over until he receives the mail order?       Insulin Glargine Solostar (Lantus SoloStar) 100 UNIT/ML SOPN     If we have a sample, can his wife  today 12/26 on her way home from work?    If no samples, will his insurance cover an emergency refill, enough until he gets the mail order?  If yes, is requesting a script be sent for the following:    Medication: Insulin Glargine Solostar (Lantus SoloStar) 100 UNIT/ML SOPN     Dose/Frequency: Inject 0.38 mL (38 Units total) under the skin 2 (two) times a day This is dosing for rehab on discharge     Quantity: ?    Pharmacy: [facility] [place]    Office:   [x] PCP/Provider - Manny Viramontes, DO   [] Speciality/Provider -     Does the patient have enough for 3 days?   [] Yes   [x] No - Send as HP to POD

## 2024-12-27 RX ORDER — INSULIN GLARGINE 100 [IU]/ML
38 INJECTION, SOLUTION SUBCUTANEOUS EVERY 12 HOURS SCHEDULED
Qty: 10 ML | Refills: 3 | Status: SHIPPED | OUTPATIENT
Start: 2024-12-27

## 2024-12-27 NOTE — TELEPHONE ENCOUNTER
Pt returned call to pass this message on to April he has received his med refill delivered so therefore no need to write an new prescription for him. Thanks

## 2024-12-27 NOTE — TELEPHONE ENCOUNTER
Pt returned call to check with April if he can get 1 or 2 insulin pen samples until he receives his refill ordered thru [facility]. He has taken his last insulin today. Warm transferred the call to practice was answered by Viki and was told April is out on break. Kindly call back the patient to update on the same. Thanks

## 2025-01-06 DIAGNOSIS — K21.9 GASTROESOPHAGEAL REFLUX DISEASE WITHOUT ESOPHAGITIS: ICD-10-CM

## 2025-01-06 DIAGNOSIS — I25.10 CORONARY ARTERY DISEASE INVOLVING NATIVE CORONARY ARTERY OF NATIVE HEART WITHOUT ANGINA PECTORIS: ICD-10-CM

## 2025-01-08 ENCOUNTER — TELEPHONE (OUTPATIENT)
Age: 65
End: 2025-01-08

## 2025-01-08 RX ORDER — PANTOPRAZOLE SODIUM 20 MG/1
20 TABLET, DELAYED RELEASE ORAL DAILY
Qty: 90 TABLET | Refills: 1 | Status: SHIPPED | OUTPATIENT
Start: 2025-01-08

## 2025-01-08 RX ORDER — CLOPIDOGREL BISULFATE 75 MG/1
75 TABLET ORAL DAILY
Qty: 90 TABLET | Refills: 0 | Status: SHIPPED | OUTPATIENT
Start: 2025-01-08

## 2025-01-08 NOTE — TELEPHONE ENCOUNTER
"Pts next apt with Dr. Viramontes is 5/22/25. Pt in need of DME incontinence supplies for [facility] care to send to his new [facility]. (New [facility] duel complete updated in pts chart electronically and then he has [facility] medicaid secondary).  Pt needs order for DME for large gloves, wipes, XL pull ups, and bed sheets. He would like this order mailed to his new home address updated on file. ([address])    Pt needs all rx's to go [facility] as a 3 mo supply.    Pt's blood sugars have also been trending up since he was dc'd from a 14 month stay at [facility]. He was dc'd to home on 12/1. Pt has been taking more insulin than rx'd to keep his sugars stable. Pt is asking for Dr. Viramontes to adjust his insulin to 50 or 60 units a day instead of the current 38. Pt states he was \"fed like a bird\" in the nursing home, so that's why his sugars are reading a little higher because he's eating different at home. They have been hovering around the 200's but when he takes the extra units they come down to the 120's/130's. He is also falling one or two pens short, twice a month. A1c's have been good.  Pt is asking if this can be managed without an apt, although I think the patient very likely needs at least a virtual visit/telephone call since he doesn't have mychart-please advise. He is a double amputee, and until he gets his scooter, its very hard for him to come into apts.   "

## 2025-01-12 DIAGNOSIS — Z95.1 S/P CABG (CORONARY ARTERY BYPASS GRAFT): ICD-10-CM

## 2025-01-12 DIAGNOSIS — Z79.4 TYPE 2 DIABETES MELLITUS WITH DIABETIC NEUROPATHY, WITH LONG-TERM CURRENT USE OF INSULIN (HCC): ICD-10-CM

## 2025-01-12 DIAGNOSIS — E11.40 TYPE 2 DIABETES MELLITUS WITH DIABETIC NEUROPATHY, WITH LONG-TERM CURRENT USE OF INSULIN (HCC): ICD-10-CM

## 2025-01-12 DIAGNOSIS — R33.9 URINARY RETENTION: ICD-10-CM

## 2025-01-12 DIAGNOSIS — I25.10 CORONARY ARTERY DISEASE INVOLVING NATIVE CORONARY ARTERY OF NATIVE HEART WITHOUT ANGINA PECTORIS: ICD-10-CM

## 2025-01-12 RX ORDER — INSULIN GLARGINE 100 [IU]/ML
50 INJECTION, SOLUTION SUBCUTANEOUS 2 TIMES DAILY
Qty: 15 ML | Refills: 3 | Status: SHIPPED | OUTPATIENT
Start: 2025-01-12 | End: 2025-01-14 | Stop reason: SDUPTHER

## 2025-01-13 RX ORDER — FINASTERIDE 5 MG/1
5 TABLET, FILM COATED ORAL DAILY
Qty: 90 TABLET | Refills: 1 | Status: SHIPPED | OUTPATIENT
Start: 2025-01-13

## 2025-01-13 RX ORDER — ATORVASTATIN CALCIUM 80 MG/1
80 TABLET, FILM COATED ORAL DAILY
Qty: 90 TABLET | Refills: 0 | Status: SHIPPED | OUTPATIENT
Start: 2025-01-13

## 2025-01-13 RX ORDER — INSULIN GLARGINE 100 [IU]/ML
50 INJECTION, SOLUTION SUBCUTANEOUS
Status: CANCELLED | OUTPATIENT
Start: 2025-01-13

## 2025-01-13 RX ORDER — TAMSULOSIN HYDROCHLORIDE 0.4 MG/1
0.4 CAPSULE ORAL
Qty: 90 CAPSULE | Refills: 1 | Status: SHIPPED | OUTPATIENT
Start: 2025-01-13

## 2025-01-13 NOTE — TELEPHONE ENCOUNTER
Patient called in and stated that Lantus vials were sent to the [facility] pharmacy and he doesn't use vials. Patient stated that he uses the Lantus Pens. Patient stated that he wanted his lantus increased. Called over to the office as the patient doesn't have enough to last him till this afternoon. Talked with Sybil and she will send a message to Dr. Viramontes. Call ended well.

## 2025-01-13 NOTE — TELEPHONE ENCOUNTER
Pt called back to f/u on script for insulin. So far the pharmacy has not received anything yet and he may not get his insulin in time before the end of the night. Please call pt back when script is sent. Script needs to be for the insulin pens and must go to [facility] in [place]

## 2025-01-13 NOTE — TELEPHONE ENCOUNTER
Patient called back looking for an update as he only has enough medication for tonight. Please review.

## 2025-01-14 DIAGNOSIS — E11.40 TYPE 2 DIABETES MELLITUS WITH DIABETIC NEUROPATHY, WITH LONG-TERM CURRENT USE OF INSULIN (HCC): ICD-10-CM

## 2025-01-14 DIAGNOSIS — Z79.4 TYPE 2 DIABETES MELLITUS WITH DIABETIC NEUROPATHY, WITH LONG-TERM CURRENT USE OF INSULIN (HCC): ICD-10-CM

## 2025-01-14 RX ORDER — INSULIN GLARGINE 100 [IU]/ML
50 INJECTION, SOLUTION SUBCUTANEOUS 3 TIMES DAILY
Qty: 30 ML | Refills: 3 | Status: SHIPPED | OUTPATIENT
Start: 2025-01-14

## 2025-01-14 NOTE — TELEPHONE ENCOUNTER
Patient called back about the Lantus pens still not being at the [facility] pharmacy.  He is completely out of insulin now.  I warm transferred to clerical.

## 2025-02-18 NOTE — ASSESSMENT & PLAN NOTE
Rt aka site c/d/I  Pt's site healing well  Pt with no erythema  Pt denies any pain  Cont local wound care  Keep vascular appt for 11/22/2023
Stable  Cont atorvastatin 80 mg 1 tab po QHS
Stable  Cont atorvastatin 80 mg 1 tab po qhs  Cont plavix 75 mg 1 tab po daily  Cont aspirin 81 mg 1 tab po daily
Stable  Cont supportive care  Consider melatonin to help him sleep vs low dose restoril
Discharged

## 2025-03-12 DIAGNOSIS — I25.10 CORONARY ARTERY DISEASE INVOLVING NATIVE CORONARY ARTERY OF NATIVE HEART WITHOUT ANGINA PECTORIS: ICD-10-CM

## 2025-03-14 RX ORDER — CLOPIDOGREL BISULFATE 75 MG/1
75 TABLET ORAL DAILY
Qty: 90 TABLET | Refills: 1 | Status: SHIPPED | OUTPATIENT
Start: 2025-03-14

## 2025-03-19 DIAGNOSIS — I25.10 CORONARY ARTERY DISEASE INVOLVING NATIVE CORONARY ARTERY OF NATIVE HEART WITHOUT ANGINA PECTORIS: ICD-10-CM

## 2025-03-20 RX ORDER — ATORVASTATIN CALCIUM 80 MG/1
80 TABLET, FILM COATED ORAL DAILY
Qty: 90 TABLET | Refills: 0 | Status: SHIPPED | OUTPATIENT
Start: 2025-03-20

## 2025-03-22 DIAGNOSIS — K21.9 GASTROESOPHAGEAL REFLUX DISEASE WITHOUT ESOPHAGITIS: ICD-10-CM

## 2025-03-23 RX ORDER — PANTOPRAZOLE SODIUM 20 MG/1
20 TABLET, DELAYED RELEASE ORAL DAILY
Qty: 100 TABLET | Refills: 2 | Status: SHIPPED | OUTPATIENT
Start: 2025-03-23

## 2025-03-26 DIAGNOSIS — R33.9 URINARY RETENTION: ICD-10-CM

## 2025-03-26 DIAGNOSIS — Z95.1 S/P CABG (CORONARY ARTERY BYPASS GRAFT): ICD-10-CM

## 2025-03-27 RX ORDER — FINASTERIDE 5 MG/1
5 TABLET, FILM COATED ORAL DAILY
Qty: 100 TABLET | Refills: 1 | Status: SHIPPED | OUTPATIENT
Start: 2025-03-27

## 2025-03-27 RX ORDER — TAMSULOSIN HYDROCHLORIDE 0.4 MG/1
0.4 CAPSULE ORAL
Qty: 100 CAPSULE | Refills: 1 | Status: SHIPPED | OUTPATIENT
Start: 2025-03-27

## 2025-04-04 DIAGNOSIS — E11.40 TYPE 2 DIABETES MELLITUS WITH DIABETIC NEUROPATHY, WITH LONG-TERM CURRENT USE OF INSULIN (HCC): ICD-10-CM

## 2025-04-04 DIAGNOSIS — Z79.4 TYPE 2 DIABETES MELLITUS WITH DIABETIC NEUROPATHY, WITH LONG-TERM CURRENT USE OF INSULIN (HCC): ICD-10-CM

## 2025-04-04 RX ORDER — INSULIN GLARGINE 100 [IU]/ML
50 INJECTION, SOLUTION SUBCUTANEOUS 3 TIMES DAILY
Qty: 135 ML | Refills: 1 | Status: SHIPPED | OUTPATIENT
Start: 2025-04-04 | End: 2025-07-03

## 2025-04-04 NOTE — TELEPHONE ENCOUNTER
Reason for call:   [x] Refill   [] Prior Auth  [] Other:     Office:   [x] PCP/Provider -  [facility]  Authorized By: Manny Viramontes DO  [] Specialty/Provider -     Medication:     Insulin Glargine Solostar (Lantus SoloStar) 100 UNIT/ML SOPN 50 Units, 3 times daily 3 ordered        Edit  Cancel Reorder       [facility] - [place], [place] - [address] [facility]    Pharmacy:     Local Pharmacy   Does the patient have enough for 3 days?   [] Yes   [x] No - Send as HP to POD    Mail Away Pharmacy   Does the patient have enough for 10 days?   [] Yes   [] No - Send as HP to POD

## 2025-04-11 ENCOUNTER — TELEPHONE (OUTPATIENT)
Age: 65
End: 2025-04-11

## 2025-04-11 NOTE — TELEPHONE ENCOUNTER
[facility] calling to check for update on order confirmation fax that they had sent to the office on 4/8/25. Informed pharmacy that we have the paperwork scanned in patient's chart with the fax confirmation to their pharmacy on 4/10/25 at 9:55 AM. Pharmacy is requesting the paperwork to be faxed again as they state they have not received it. Confirmed fax number with them, [phone]. Please fax again when possible. Thank you!

## 2025-04-22 ENCOUNTER — TELEPHONE (OUTPATIENT)
Age: 65
End: 2025-04-22

## 2025-04-22 NOTE — TELEPHONE ENCOUNTER
R[facility]ia from [facility], called along with patient on the line, regarding in-network facilities that will work with patient's insurance, [employer]. She stated the below facilities work with patients insurance and for Dr Viramontes to send script for Electric wheelchair and most recent office visit notes to facility he chooses. Patient advised he really does not have use of his hands; he is unable to use hands to maneuver manual wheelchair. That's why he is requesting the electric wheelchair and would like Dr Viramontes to include that information in the notes. Patient would like a call back once information has been sent; [phone].     [facility]    P -[phone]    [facility]  P -[phone]    [facility]  P -[phone]

## 2025-04-22 NOTE — TELEPHONE ENCOUNTER
Stacey calls to ask about an order for an electric wheelchair. Patient states that a script was sent to [facility] but that [facility] is not in network with his [facility]. I was unable to find a prescription for the electric wheelchair. Patient will have to call [facility] to ask who is in network for his insurance. No further questions

## 2025-04-22 NOTE — LETTER
4/24/2025  RE: Abner King  To Whom It May Concern:  I am writing on behalf of my patient, Abner King, to request DME, electric wheelchair  for the treatment of ambulatory dysfunction. This letter provides information about the patient’s medical history and diagnosis, which will support medical necessity.  Diagnoses: ***  Clinical History: ***  Thank you in advance for your cooperation. If you have any questions or require additional documentation, please do not hesitate to contact us.  Sincerely,  Fabiola Herr RN

## 2025-04-24 NOTE — TELEPHONE ENCOUNTER
Manny Viramontes, DO to [facility]  17 hours ago  Okay    Letter of necessity done and put on Dr Viramontes's desk to be signed and will send with DME order and office notes to the DME company all together.

## 2025-04-24 NOTE — TELEPHONE ENCOUNTER
The patient called the office to check on the status of his letter. Patient was made aware that the provider is still working on it and will fax it over as soon as they can. No further questions

## 2025-04-30 ENCOUNTER — TELEPHONE (OUTPATIENT)
Age: 65
End: 2025-04-30

## 2025-04-30 NOTE — TELEPHONE ENCOUNTER
Mark Willson [facility] called with the patient on the line.    Mark received a script from Primary Care Provider (GT) for a power mobility - electric wheelchair.   Mark stated the patient needs an appointment with the provider for clinical documentation and also a medical questionnaire to be completed.  This must be an in-person visit.   Last OV 11/21/2024 is too far out, in person visit needs to be more recent.    Patient scheduled appointment this Friday, May 2nd.    Mark Willson [employer] is faxing the medical questionnaire form to fax# [phone].  This form will need to be completed during the visit on May 2nd and returned to the fax# on the form.    Any questions, contact Mark Willson  phone# [phone]  option #9  extension: [phone]

## 2025-05-02 ENCOUNTER — OFFICE VISIT (OUTPATIENT)
Age: 65
End: 2025-05-02
Payer: COMMERCIAL

## 2025-05-02 VITALS
HEART RATE: 73 BPM | TEMPERATURE: 98.7 F | OXYGEN SATURATION: 98 % | HEIGHT: 73 IN | DIASTOLIC BLOOD PRESSURE: 70 MMHG | BODY MASS INDEX: 26.57 KG/M2 | SYSTOLIC BLOOD PRESSURE: 130 MMHG | RESPIRATION RATE: 16 BRPM

## 2025-05-02 DIAGNOSIS — S88.911D: ICD-10-CM

## 2025-05-02 DIAGNOSIS — Z79.4 TYPE 2 DIABETES MELLITUS WITH DIABETIC NEUROPATHY, WITH LONG-TERM CURRENT USE OF INSULIN (HCC): Primary | ICD-10-CM

## 2025-05-02 DIAGNOSIS — R29.898 BILATERAL ARM WEAKNESS: ICD-10-CM

## 2025-05-02 DIAGNOSIS — S88.912D: ICD-10-CM

## 2025-05-02 DIAGNOSIS — E11.40 TYPE 2 DIABETES MELLITUS WITH DIABETIC NEUROPATHY, WITH LONG-TERM CURRENT USE OF INSULIN (HCC): Primary | ICD-10-CM

## 2025-05-02 DIAGNOSIS — G63 POLYNEUROPATHY ASSOCIATED WITH UNDERLYING DISEASE (HCC): ICD-10-CM

## 2025-05-02 PROBLEM — S88.912A COMPLETE AMPUTATION OF BILATERAL LEGS (HCC): Status: ACTIVE | Noted: 2025-05-02

## 2025-05-02 PROBLEM — S88.911A COMPLETE AMPUTATION OF BILATERAL LEGS (HCC): Status: ACTIVE | Noted: 2025-05-02

## 2025-05-02 PROCEDURE — 99214 OFFICE O/P EST MOD 30 MIN: CPT | Performed by: FAMILY MEDICINE

## 2025-05-02 PROCEDURE — 83036 HEMOGLOBIN GLYCOSYLATED A1C: CPT | Performed by: FAMILY MEDICINE

## 2025-05-02 RX ORDER — POLYETHYLENE GLYCOL 3350, SODIUM SULFATE ANHYDROUS, SODIUM BICARBONATE, SODIUM CHLORIDE, POTASSIUM CHLORIDE 236; 22.74; 6.74; 5.86; 2.97 G/4L; G/4L; G/4L; G/4L; G/4L
POWDER, FOR SOLUTION ORAL
COMMUNITY

## 2025-05-05 NOTE — ASSESSMENT & PLAN NOTE
Continue current treatment and filled out paperwork for electric wheelchair. Discussed supportive care and return parameters.

## 2025-05-05 NOTE — PROGRESS NOTES
Name: Abner King      : 1960      MRN: [id number]  Encounter Provider: Kurtis Garrison MD  Encounter Date: 2025   Encounter department: [facility] PRIMARY CARE  :  Assessment & Plan  Type 2 diabetes mellitus with diabetic neuropathy, with long-term current use of insulin (HCC)  Continue meds. Discussed supportive care and return parameters.   Lab Results   Component Value Date    HGBA1C 7.7 (A) 2024       Orders:    POCT hemoglobin A1c    Polyneuropathy associated with underlying disease (HCC)  Continue current treatment and filled out paperwork for electric wheelchair. Discussed supportive care and return parameters.        Complete amputation of both lower extremities, subsequent encounter (HCC)  Continue current treatment and filled out paperwork for electric wheelchair. Discussed supportive care and return parameters.        Bilateral arm weakness  Continue current treatment and filled out paperwork for electric wheelchair. Discussed supportive care and return parameters.               History of Present Illness   Patient is a 65 y/o male who presents for follow-up s/p double amp. LE, bilateral arm weakness and numbness from neuropathy, and DM2. Pt is requesting documentation for electric wheelchair no fevers chills nausea or vomiting.      Review of Systems   Constitutional: Negative.    HENT: Negative.     Eyes: Negative.    Respiratory: Negative.     Cardiovascular: Negative.    Gastrointestinal: Negative.    Endocrine: Negative.    Genitourinary: Negative.    Musculoskeletal:  Positive for arthralgias and myalgias.   Allergic/Immunologic: Negative.    Neurological:  Positive for weakness and numbness.   Hematological: Negative.    Psychiatric/Behavioral: Negative.     All other systems reviewed and are negative.      Objective   /70 (BP Location: Left arm, Patient Position: Sitting, Cuff Size: Large)   Pulse 73   Temp 98.7 °F (37.1 °C) (Temporal)   Resp 16   Ht  "6' 1\" (1.854 m)   SpO2 98%   BMI 26.57 kg/m²      Physical Exam  Vitals reviewed.   Constitutional:       General: He is not in acute distress.     Appearance: He is well-developed. He is not diaphoretic.   HENT:      Head: Normocephalic and atraumatic.      Right Ear: External ear normal.      Left Ear: External ear normal.      Nose: Nose normal.   Eyes:      General: No scleral icterus.        Right eye: No discharge.         Left eye: No discharge.      Conjunctiva/sclera: Conjunctivae normal.      Pupils: Pupils are equal, round, and reactive to light.   Neck:      Thyroid: No thyromegaly.      Trachea: No tracheal deviation.   Cardiovascular:      Rate and Rhythm: Normal rate and regular rhythm.      Heart sounds: Normal heart sounds. No murmur heard.     No friction rub.   Pulmonary:      Effort: Pulmonary effort is normal. No respiratory distress.      Breath sounds: Normal breath sounds. No stridor. No wheezing or rales.   Abdominal:      General: There is no distension.      Palpations: Abdomen is soft. There is no mass.      Tenderness: There is no abdominal tenderness. There is no guarding or rebound.   Musculoskeletal:      Cervical back: Normal range of motion and neck supple.      Comments: S/p double LE amp.   Lymphadenopathy:      Cervical: No cervical adenopathy.   Skin:     General: Skin is warm.   Neurological:      Mental Status: He is alert and oriented to person, place, and time.      Cranial Nerves: No cranial nerve deficit.   Psychiatric:         Behavior: Behavior normal.         Thought Content: Thought content normal.         Judgment: Judgment normal.         "

## 2025-05-06 DIAGNOSIS — I25.10 CORONARY ARTERY DISEASE INVOLVING NATIVE CORONARY ARTERY OF NATIVE HEART WITHOUT ANGINA PECTORIS: ICD-10-CM

## 2025-05-07 ENCOUNTER — OFFICE VISIT (OUTPATIENT)
Age: 65
End: 2025-05-07
Payer: COMMERCIAL

## 2025-05-07 DIAGNOSIS — H43.11 VITREOUS HEMORRHAGE OF RIGHT EYE (HCC): ICD-10-CM

## 2025-05-07 DIAGNOSIS — E11.3593 PROLIFERATIVE DIABETIC RETINOPATHY OF BOTH EYES WITHOUT MACULAR EDEMA ASSOCIATED WITH TYPE 2 DIABETES MELLITUS (HCC): Primary | ICD-10-CM

## 2025-05-07 LAB — SL AMB POCT HEMOGLOBIN AIC: 6.9 (ref ?–6.5)

## 2025-05-07 PROCEDURE — 67028 INJECTION EYE DRUG: CPT | Performed by: OPHTHALMOLOGY

## 2025-05-07 PROCEDURE — 92250 FUNDUS PHOTOGRAPHY W/I&R: CPT | Performed by: OPHTHALMOLOGY

## 2025-05-07 PROCEDURE — 92134 CPTRZ OPH DX IMG PST SGM RTA: CPT | Performed by: OPHTHALMOLOGY

## 2025-05-07 PROCEDURE — 99204 OFFICE O/P NEW MOD 45 MIN: CPT | Performed by: OPHTHALMOLOGY

## 2025-05-07 RX ORDER — ATORVASTATIN CALCIUM 80 MG/1
80 TABLET, FILM COATED ORAL DAILY
Qty: 90 TABLET | Refills: 0 | Status: SHIPPED | OUTPATIENT
Start: 2025-05-07

## 2025-05-07 RX ORDER — BEVACIZUMAB 1.25MG/.05
1.25 SYRINGE (ML) INTRAOCULAR
Status: COMPLETED | OUTPATIENT
Start: 2025-05-07 | End: 2025-05-07

## 2025-05-07 RX ADMIN — Medication 1.25 MG: at 12:38

## 2025-05-07 NOTE — PROGRESS NOTES
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 Stressed the importance of following up for monitoring and management by a primary care physician.     Lab Results   Component Value Date    HGBA1C 6.9 (A) 2025       Orders:    Intravitreal Injection, Pharmacologic Agent - OD - Right Eye    Vitreous hemorrhage of right eye (HCC)  Recommend monitoring; B-scan showed no evidence of RD; Pt to f/u sooner if condition worsens;   Orders:    OCT, Retina - OU - Both Eyes    Fundus Photos - OU - Both Eyes            Abner King is a 64 y.o. male who presents today for Diabetic Eye exam/New floaters OD. Pt is a type 2 Diabetic for 30+ years. He was being treated for Diabetic Retinopathy with injections and lasers by Dr. Gutierrez. His last injection was 3 years ago. He saw [facility] for [facility] 6 months, no treatment done at that time. His BS was 140 today and last ha1c was 6.8. He reports that he feels like his vision is worsening OU and he starting noticing a new spider like floater OD 2 days ago.    History obtained from: patient    Review of Systems  Medical History Reviewed by provider this encounter:  Tobacco  Allergies  Meds  Problems  Med Hx  Surg Hx  Fam Hx     .     Past Ocular History: T2DM with Diabetic Retinopathy  Ocular Meds/Drops:  None    Base Eye Exam       Visual Acuity (Snellen - Linear)         Right Left    Dist sc CF 20/100    Dist ph sc  NI              Tonometry (Applanation, 11:27  AM)         Right Left    Pressure 18 17              Pupils         Dark Shape    Right 2     Left 3 Irregular              Visual Fields         Left Right     Full     Restrictions  Total superior temporal, inferior temporal, superior nasal, inferior nasal deficiencies              Extraocular Movement         Right Left     Full, Ortho Full, Ortho              Neuro/Psych       Oriented x3: Yes              Dilation       Both eyes: 2.5% Phenylephrine, 1% Tropicamide @ 11:28 AM                  Slit Lamp and Fundus Exam       External Exam         Right Left    External Normal Normal              Slit Lamp Exam         Right Left    Lids/Lashes Normal Normal    Conjunctiva/Sclera White and quiet White and quiet    Cornea Clear Clear    Anterior Chamber Deep and quiet Deep and quiet    Iris Round and reactive Round and reactive    Lens PCIOL with open capsule PCIOL with open capsule    Anterior Vitreous 3+ vitreous hemorrhage trace vit heme              Fundus Exam         Right Left    Disc no view sharp, no pallor    C/D Ratio  0.25    Macula No view ERM; MA's    Vessels sclerotic normal in caliber    Periphery ? Appears attached flat, no retinal tear or detachment; ? PRP; dot heme;                      IMAGING:  OCT, Retina - OU - Both Eyes          Left Eye  Quality was good. Findings include epiretinal membrane.     Notes  Vitreous hemorrhage OD;          Fundus Photos - OU - Both Eyes          Right Eye  Periphery findings include (Vitreous hemorrhage).     Left Eye  Macula findings include microaneurysms.          Intravitreal Injection, Pharmacologic Agent - OD - Right Eye          Time Out  5/7/2025. 12:27 PM. Confirmed correct patient, procedure, site, and patient consented.     Anesthesia  Topical anesthesia was used. Anesthetic medications included Proparacaine 0.5%, Tetracaine 0.5%.     Procedure  Preparation included 5% betadine to ocular surface, eyelid speculum. A 30 gauge needle was used.      Injection:  1.25 mg bevacizumab 1.25 MG/0.05ML    Route: Intravitreal, Site: Right Eye    NDC: [address]-7, Lot: [id number], Expiration date: 8/14/2025     Post-op  Post injection exam found visual acuity of at least counting fingers, no retinal detachment, perfused optic nerve. The patient tolerated the procedure well. There were no complications. The patient received written and verbal post procedure care education.

## 2025-05-08 ENCOUNTER — TELEPHONE (OUTPATIENT)
Age: 65
End: 2025-05-08

## 2025-05-08 NOTE — TELEPHONE ENCOUNTER
Left message for Wanda to call back to see if she received the for we faxed over or if she is looking for something else.

## 2025-05-08 NOTE — TELEPHONE ENCOUNTER
Patient called in regards to [facility] faxing over a form with needing addition information from provider for patients electric wheel chair. If office has not received fax patient would like office to reach out to [facility] at 1 [phone] ext. [phone] and ask for shivam.

## 2025-05-09 ENCOUNTER — TELEPHONE (OUTPATIENT)
Age: 65
End: 2025-05-09

## 2025-05-09 NOTE — TELEPHONE ENCOUNTER
Form and office visit note was signed and faxed to [facility] at [phone], confirmation received. Placed in folder to be scanned.//rr

## 2025-05-09 NOTE — TELEPHONE ENCOUNTER
Spoke with pt reassured patient that we have to give the injection time to work and he should keep his appointment as scheduled next week.

## 2025-05-09 NOTE — TELEPHONE ENCOUNTER
Called patient back to offer sooner appointment after speaking with Dr. Burt. He reports that he can see hand motion. Scheduled patient for Monday 5/12/25 at 2:30pm.

## 2025-05-09 NOTE — TELEPHONE ENCOUNTER
Pt called concerned about Vitreous hemorrhage in which he saw Dr Sierra about on 5/7/25. He feels as if it getting worse and covering more of his eye. He would like a call back to discuss. He is also worried if he needs surgery because he is a class 4 and is not able to have the procedure done in the ASC. Forwarding to the [facility] for a follow up.

## 2025-05-09 NOTE — TELEPHONE ENCOUNTER
A fax  came over fax machine requesting office notes to be signed by provider and a signature attestation statement to be signed by the provider, both placed on desk for signature and will faxed once received back.

## 2025-05-09 NOTE — TELEPHONE ENCOUNTER
Wanda from [facility] calling back. Asking for office visit intojeremiah from 5/2. I faxed them to her number that she provided. Asking if anything else they will call back.     Reza - director of medical information  P- [phone] Option 8 @ ext [phone]

## 2025-05-12 ENCOUNTER — OFFICE VISIT (OUTPATIENT)
Age: 65
End: 2025-05-12
Payer: COMMERCIAL

## 2025-05-12 DIAGNOSIS — H43.11 VITREOUS HEMORRHAGE OF RIGHT EYE (HCC): ICD-10-CM

## 2025-05-12 DIAGNOSIS — E11.3593 PROLIFERATIVE DIABETIC RETINOPATHY OF BOTH EYES WITHOUT MACULAR EDEMA ASSOCIATED WITH TYPE 2 DIABETES MELLITUS (HCC): Primary | ICD-10-CM

## 2025-05-12 PROCEDURE — 99213 OFFICE O/P EST LOW 20 MIN: CPT | Performed by: OPHTHALMOLOGY

## 2025-05-12 NOTE — PROGRESS NOTES
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         Vitreous hemorrhage of right eye (HCC)  Improving; Continue to monitor;                Abner King is a 64 y.o. male who presents Today for Decrease in vision since Injection OD. Pt reports there is still a curtain over vision OD however he started to be able to see through it on . He denies any new floaters or flashes   History obtained from: patient    Review of Systems  Medical History Reviewed by provider this encounter:  Tobacco  Allergies  Meds  Problems  Med Hx  Surg Hx  Fam Hx     .     Past Ocular History: VH OD, PDR OU  Ocular Meds/Drops: None    Base Eye Exam       Visual Acuity (Snellen - Linear)         Right Left    Dist sc 20/400 20/150              Tonometry (Applanation, 3:06 PM)         Right Left    Pressure 10               Pupils         Dark Shape APD    Right 2  +    Left 3 Irregular               Visual Fields         Left Right     Full Full              Extraocular Movement         Right Left     Full, Ortho Full, Ortho              Neuro/Psych       Oriented x3: Yes              Dilation       Right eye: 0.5% Proparacaine, 2.5% Phenylephrine, 1% Tropicamide @ 3:06 PM                  Slit Lamp and Fundus Exam       External Exam         Right Left    External Normal Normal              Slit Lamp Exam         Right Left    Lids/Lashes Normal Normal    Conjunctiva/Sclera White and quiet White and quiet    Cornea Clear Clear    Anterior Chamber Deep and quiet Deep and quiet    Iris Round and reactive Round and reactive     Lens PCIOL with open capsule PCIOL with open capsule    Anterior Vitreous 2+ vitreous hemorrhage trace vit heme              Fundus Exam         Right Left    Disc sharp sharp, no pallor    C/D Ratio  0.25    Macula appears flat ERM; MA's    Vessels sclerotic normal in caliber    Periphery dot heme; PRP flat, no retinal tear or detachment; ? PRP; dot heme;                      IMAGING:

## 2025-05-13 ENCOUNTER — TELEPHONE (OUTPATIENT)
Age: 65
End: 2025-05-13

## 2025-05-13 NOTE — TELEPHONE ENCOUNTER
Another form/ order came in and needed to be signed by Dr Garrison, form signed and faxed to [facility] at [phone]. Confirmation received.

## 2025-05-21 ENCOUNTER — OFFICE VISIT (OUTPATIENT)
Age: 65
End: 2025-05-21
Payer: COMMERCIAL

## 2025-05-21 DIAGNOSIS — E11.3593 PROLIFERATIVE DIABETIC RETINOPATHY OF BOTH EYES WITHOUT MACULAR EDEMA ASSOCIATED WITH TYPE 2 DIABETES MELLITUS (HCC): Primary | ICD-10-CM

## 2025-05-21 DIAGNOSIS — H43.11 VITREOUS HEMORRHAGE OF RIGHT EYE (HCC): ICD-10-CM

## 2025-05-21 PROCEDURE — 92134 CPTRZ OPH DX IMG PST SGM RTA: CPT | Performed by: OPHTHALMOLOGY

## 2025-05-21 PROCEDURE — 99213 OFFICE O/P EST LOW 20 MIN: CPT | Performed by: OPHTHALMOLOGY

## 2025-05-21 NOTE — PROGRESS NOTES
Name: Abner King      : 1960      MRN: [id number]  Encounter Provider: Micaela Sierra MD  Encounter Date: 2025   Encounter department: [facility] OPHTHALMOLOGY  :  Assessment & Plan  Proliferative diabetic retinopathy of both eyes without macular edema associated with type 2 diabetes mellitus (HCC)  Doing well; Recommend intravitreal Avastin in 2 weeks;   Lab Results   Component Value Date    HGBA1C 6.9 (A) 2025       Orders:    OCT, Retina - OU - Both Eyes    Vitreous hemorrhage of right eye (HCC)  Improving; Pt is doing better; Vision is 20/60;                Abner King is a 64 y.o. male who presents for 1 week PDR DFE.    Patient states vision has improved slightly since last visit.    Patient still has sheer curtain.  History obtained from: patient    Review of Systems  Medical History Reviewed by provider this encounter:  Tobacco  Allergies  Meds  Problems  Med Hx  Surg Hx  Fam Hx     .     Past Ocular History: VH OD, PDR OU   Ocular Meds/Drops: None    Base Eye Exam       Visual Acuity (Snellen - Linear)         Right Left    Dist sc 20/60-2 20/200              Tonometry (Tonopen, 2:02 PM)         Right Left    Pressure 14 13              Pupils         Pupils Dark APD    Right PERRL  None    Left PERRL irregular               Visual Fields         Left Right     Full Full              Neuro/Psych       Oriented x3: Yes              Dilation       Both eyes: 2.5% Phenylephrine, 1% Tropicamide @ 2:03 PM                  Slit Lamp and Fundus Exam       External Exam         Right Left    External Normal Normal              Slit Lamp Exam         Right Left    Lids/Lashes Normal Normal    Conjunctiva/Sclera White and quiet White and quiet    Cornea Clear Clear    Anterior Chamber Deep and quiet Deep and quiet    Iris Round and reactive Round and reactive    Lens PCIOL with open capsule PCIOL with open capsule    Anterior Vitreous 1+ vitreous hemorrhage trace vit heme               Fundus Exam         Right Left    Disc sharp sharp, no pallor    C/D Ratio 0.25 0.25    Macula appears flat ERM; MA's    Vessels sclerotic normal in caliber    Periphery dot heme; PRP flat, no retinal tear or detachment; ? PRP; dot heme;                      IMAGING:  OCT, Retina - OU - Both Eyes          Right Eye  Quality was good. Findings include epiretinal membrane, intraretinal fluid.     Left Eye  Quality was good. Findings include epiretinal membrane, intraretinal fluid.

## 2025-05-24 DIAGNOSIS — I25.10 CORONARY ARTERY DISEASE INVOLVING NATIVE CORONARY ARTERY OF NATIVE HEART WITHOUT ANGINA PECTORIS: ICD-10-CM

## 2025-05-25 RX ORDER — CLOPIDOGREL BISULFATE 75 MG/1
75 TABLET ORAL DAILY
Qty: 30 TABLET | Refills: 0 | Status: SHIPPED | OUTPATIENT
Start: 2025-05-25

## 2025-06-04 ENCOUNTER — PROCEDURE VISIT (OUTPATIENT)
Age: 65
End: 2025-06-04
Payer: COMMERCIAL

## 2025-06-04 DIAGNOSIS — E11.3593 PROLIFERATIVE DIABETIC RETINOPATHY OF BOTH EYES WITHOUT MACULAR EDEMA ASSOCIATED WITH TYPE 2 DIABETES MELLITUS (HCC): Primary | ICD-10-CM

## 2025-06-04 PROCEDURE — 92134 CPTRZ OPH DX IMG PST SGM RTA: CPT | Performed by: OPHTHALMOLOGY

## 2025-06-04 PROCEDURE — 92012 INTRM OPH EXAM EST PATIENT: CPT | Performed by: OPHTHALMOLOGY

## 2025-06-04 PROCEDURE — 67028 INJECTION EYE DRUG: CPT | Performed by: OPHTHALMOLOGY

## 2025-06-04 RX ORDER — BEVACIZUMAB 1.25MG/.05
1.25 SYRINGE (ML) INTRAOCULAR
Status: COMPLETED | OUTPATIENT
Start: 2025-06-04 | End: 2025-06-04

## 2025-06-04 RX ADMIN — Medication 1.25 MG: at 14:49

## 2025-06-04 NOTE — PROGRESS NOTES
Name: Abner King      : 1960      MRN: [id number]  Encounter Provider: Micaela Sierra MD  Encounter Date: 2025   Encounter department: [facility] OPHTHALMOLOGY  :  Assessment & Plan  Proliferative diabetic retinopathy of both eyes without macular edema associated with type 2 diabetes mellitus (HCC)    Lab Results   Component Value Date    HGBA1C 6.9 (A) 2025     Pt has a significant vitreous hemorrhage in the right eye that is improving.  Once improved, discussed getting more PRP done; Pt understands and agrees with the plan. Based on clinical exam today, will do injection.  Recommend intravitreal Avastin OD; Pt understands the risks and benefits and elects to proceed with the treatment plan;      Orders:    OCT, Retina - OU - Both Eyes    Intravitreal Injection, Pharmacologic Agent - OD - Right Eye    bevacizumab (Avastin) intravitreal injection 1.25 mg            Abner King is a 64 y.o. male who presents for 2 weeks Avastin OD    Reports vision seems stable, no changes.     History obtained from: patient    Review of Systems  Medical History Reviewed by provider this encounter:  Tobacco  Allergies  Meds  Problems  Med Hx  Surg Hx  Fam Hx     .     Past Ocular History:  VH OD, PDR OU     Ocular Meds/Drops:   Avastin OD #2    Base Eye Exam       Visual Acuity (Snellen - Linear)         Right Left    Dist sc 20/60 -1 20/200    Dist ph sc NI               Tonometry (Tonopen, 2:12 PM)         Right Left    Pressure 12 9              Pupils         Shape    Right     Left Irregular              Neuro/Psych       Oriented x3: Yes              Dilation       Right eye: 2.5% Phenylephrine @ 2:12 PM              Dilation #3       Right eye: 1% Tropicamide @ 2:12 PM                  Slit Lamp and Fundus Exam       External Exam         Right Left    External Normal Normal              Slit Lamp Exam         Right Left    Lids/Lashes Normal Normal    Conjunctiva/Sclera White and quiet White  and quiet    Cornea Clear Clear    Anterior Chamber Deep and quiet Deep and quiet    Iris Round and reactive Round and reactive    Lens PCIOL with open capsule PCIOL with open capsule    Anterior Vitreous 1+ vitreous hemorrhage; subhyaloid inferiorly trace vit heme              Fundus Exam         Right Left    Disc sharp     C/D Ratio 0.25     Macula flat; MA's     Vessels sclerotic     Periphery dot heme; PRP;                       IMAGING:  OCT, Retina - OU - Both Eyes          Right Eye  Quality was good. Findings include intraretinal fluid.     Left Eye  Quality was good. Findings include intraretinal fluid.          Intravitreal Injection, Pharmacologic Agent - OD - Right Eye          Time Out  6/4/2025. 1:53 PM. Confirmed correct patient, procedure, site, and patient consented.     Anesthesia  Topical anesthesia was used. Anesthetic medications included Proparacaine 0.5%, Tetracaine 0.5%.     Procedure  Preparation included 5% betadine to ocular surface, eyelid speculum.     Injection:  1.25 mg bevacizumab 1.25 MG/0.05ML    Route: Intravitreal, Site: Right Eye    NDC: [address]-0     Post-op  Post injection exam found visual acuity of at least counting fingers, no retinal detachment, perfused optic nerve. The patient tolerated the procedure well. There were no complications. The patient received written and verbal post procedure care education. Post injection medications were not given.

## 2025-06-14 DIAGNOSIS — Z79.4 TYPE 2 DIABETES MELLITUS WITH DIABETIC NEUROPATHY, WITH LONG-TERM CURRENT USE OF INSULIN (HCC): ICD-10-CM

## 2025-06-14 DIAGNOSIS — E11.40 TYPE 2 DIABETES MELLITUS WITH DIABETIC NEUROPATHY, WITH LONG-TERM CURRENT USE OF INSULIN (HCC): ICD-10-CM

## 2025-06-15 RX ORDER — INSULIN GLARGINE 100 [IU]/ML
INJECTION, SOLUTION SUBCUTANEOUS
Qty: 150 ML | Refills: 2 | Status: SHIPPED | OUTPATIENT
Start: 2025-06-15

## 2025-06-16 ENCOUNTER — OFFICE VISIT (OUTPATIENT)
Age: 65
End: 2025-06-16
Payer: COMMERCIAL

## 2025-06-16 VITALS
OXYGEN SATURATION: 96 % | WEIGHT: 243 LBS | SYSTOLIC BLOOD PRESSURE: 122 MMHG | HEART RATE: 78 BPM | TEMPERATURE: 97.6 F | HEIGHT: 73 IN | BODY MASS INDEX: 32.2 KG/M2 | DIASTOLIC BLOOD PRESSURE: 64 MMHG

## 2025-06-16 DIAGNOSIS — I73.9 PAD (PERIPHERAL ARTERY DISEASE) (HCC): ICD-10-CM

## 2025-06-16 DIAGNOSIS — Z79.4 TYPE 2 DIABETES MELLITUS WITH HYPERGLYCEMIA, WITH LONG-TERM CURRENT USE OF INSULIN (HCC): Primary | ICD-10-CM

## 2025-06-16 DIAGNOSIS — Z89.512 S/P BKA (BELOW KNEE AMPUTATION), LEFT (HCC): ICD-10-CM

## 2025-06-16 DIAGNOSIS — E11.65 TYPE 2 DIABETES MELLITUS WITH HYPERGLYCEMIA, WITH LONG-TERM CURRENT USE OF INSULIN (HCC): Primary | ICD-10-CM

## 2025-06-16 DIAGNOSIS — Z89.611 S/P AKA (ABOVE KNEE AMPUTATION), RIGHT (HCC): ICD-10-CM

## 2025-06-16 DIAGNOSIS — I25.10 CORONARY ARTERY DISEASE INVOLVING NATIVE CORONARY ARTERY OF NATIVE HEART WITHOUT ANGINA PECTORIS: ICD-10-CM

## 2025-06-16 PROCEDURE — 99214 OFFICE O/P EST MOD 30 MIN: CPT | Performed by: FAMILY MEDICINE

## 2025-06-16 NOTE — ASSESSMENT & PLAN NOTE
Stable at this time.  Orders:    Comprehensive metabolic panel; Future    CBC and differential; Future    Hemoglobin A1C; Future    Lipid panel; Future    TSH, 3rd generation with Free T4 reflex; Future    Albumin / creatinine urine ratio; Future

## 2025-06-16 NOTE — ASSESSMENT & PLAN NOTE
Lab Results   Component Value Date    HGBA1C 6.9 (A) 05/07/2025   This is improved.  Continue with current treatment    Orders:    Comprehensive metabolic panel; Future    CBC and differential; Future    Hemoglobin A1C; Future    Lipid panel; Future    TSH, 3rd generation with Free T4 reflex; Future    Albumin / creatinine urine ratio; Future

## 2025-06-16 NOTE — ASSESSMENT & PLAN NOTE
Stable  Orders:    Comprehensive metabolic panel; Future    CBC and differential; Future    Hemoglobin A1C; Future    Lipid panel; Future    TSH, 3rd generation with Free T4 reflex; Future    Albumin / creatinine urine ratio; Future

## 2025-06-16 NOTE — PROGRESS NOTES
Name: Abner King      : 1960      MRN: [id number]  Encounter Provider: Vitor Viramontes DO  Encounter Date: 2025   Encounter department: [facility] PRIMARY CARE  :  Assessment & Plan  Type 2 diabetes mellitus with hyperglycemia, with long-term current use of insulin ([facility])    Lab Results   Component Value Date    HGBA1C 6.9 (A) 2025   This is improved.  Continue with current treatment    Orders:    Comprehensive metabolic panel; Future    CBC and differential; Future    Hemoglobin A1C; Future    Lipid panel; Future    TSH, 3rd generation with Free T4 reflex; Future    Albumin / creatinine urine ratio; Future    PAD (peripheral artery disease) ([facility])  Stable at this time.  Orders:    Comprehensive metabolic panel; Future    CBC and differential; Future    Hemoglobin A1C; Future    Lipid panel; Future    TSH, 3rd generation with Free T4 reflex; Future    Albumin / creatinine urine ratio; Future    Coronary artery disease involving native coronary artery of native heart without angina pectoris  Stable  Orders:    Comprehensive metabolic panel; Future    CBC and differential; Future    Hemoglobin A1C; Future    Lipid panel; Future    TSH, 3rd generation with Free T4 reflex; Future    Albumin / creatinine urine ratio; Future    S/P BKA (below knee amputation), left ([facility])  Follow-up with prosthetic expert  Orders:    Ambulatory Referral to Physical Therapy; Future    S/P AKA (above knee amputation), right ([facility])  Follow-up with prosthetic expert  Orders:    Ambulatory Referral to Physical Therapy; Future           History of Present Illness   Patient here for follow-up on diabetes CAD peripheral vascular disease status post AKA on the right BKA on the left.  Some visual issues noted.  Patient with recent blood behind the retina on the right.  Patient is working with ophthalmology.  No other new chest pain shortness of breath or problems urinary artery or defecating at this time.      Review of  "Systems   Constitutional: Negative.    HENT: Negative.     Eyes:  Positive for visual disturbance.   Respiratory: Negative.     Cardiovascular: Negative.    Gastrointestinal: Negative.    Endocrine: Negative.    Genitourinary: Negative.    Musculoskeletal: Negative.    Skin:  Positive for rash.   Allergic/Immunologic: Negative.    Neurological: Negative.    Hematological: Negative.    Psychiatric/Behavioral: Negative.         Objective   /64 (BP Location: Right arm, Patient Position: Sitting, Cuff Size: Large)   Pulse 78   Temp 97.6 °F (36.4 °C) (Temporal)   Ht 6' 1\" (1.854 m)   Wt 110 kg (243 lb)   SpO2 96%   BMI 32.06 kg/m²      Physical Exam  Vitals and nursing note reviewed.   Constitutional:       General: He is not in acute distress.     Appearance: Normal appearance. He is not ill-appearing, toxic-appearing or diaphoretic.   HENT:      Head: Normocephalic and atraumatic.     Cardiovascular:      Rate and Rhythm: Normal rate and regular rhythm.      Pulses: Normal pulses.      Heart sounds: Normal heart sounds.   Pulmonary:      Effort: Pulmonary effort is normal.      Breath sounds: Normal breath sounds.     Skin:     Findings: Rash present.      Comments: Mild redness over the right leg     Neurological:      Mental Status: He is alert.     Psychiatric:         Mood and Affect: Mood normal.         Behavior: Behavior normal.         Thought Content: Thought content normal.         Judgment: Judgment normal.         "

## 2025-06-17 RX ORDER — CLOPIDOGREL BISULFATE 75 MG/1
75 TABLET ORAL DAILY
Qty: 30 TABLET | Refills: 11 | Status: SHIPPED | OUTPATIENT
Start: 2025-06-17
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
## 2025-06-25 ENCOUNTER — TELEPHONE (OUTPATIENT)
Age: 65
End: 2025-06-25

## 2025-06-25 NOTE — TELEPHONE ENCOUNTER
[facility] pharmacy called to clarify patient's dosing for   Lantus SoloStar 100 units/mL SOPN   Sig currently reads: INJECT SUBCUTANEOUSLY 50 UNITS 3 TIMES DAILY ; THIS IS DOSING FOR REHAB ON DISCHARGE     Advised pharmacy that patient has been ordered this dose since 1/14/2025 and three times per day is correct.

## 2025-06-26 ENCOUNTER — OFFICE VISIT (OUTPATIENT)
Age: 65
End: 2025-06-26
Payer: COMMERCIAL

## 2025-06-26 DIAGNOSIS — H43.13: ICD-10-CM

## 2025-06-26 DIAGNOSIS — E11.3593 PROLIFERATIVE DIABETIC RETINOPATHY OF BOTH EYES WITHOUT MACULAR EDEMA ASSOCIATED WITH TYPE 2 DIABETES MELLITUS (HCC): Primary | ICD-10-CM

## 2025-06-26 PROCEDURE — 92134 CPTRZ OPH DX IMG PST SGM RTA: CPT | Performed by: OPHTHALMOLOGY

## 2025-06-26 PROCEDURE — C9257 BEVACIZUMAB INJECTION: HCPCS | Performed by: OPHTHALMOLOGY

## 2025-06-26 PROCEDURE — 67028 INJECTION EYE DRUG: CPT | Performed by: OPHTHALMOLOGY

## 2025-06-26 PROCEDURE — 99213 OFFICE O/P EST LOW 20 MIN: CPT | Performed by: OPHTHALMOLOGY

## 2025-06-26 NOTE — PROGRESS NOTES
Name: Abner King      : 1960      MRN: [id number]  Encounter Provider: Micaela Sierra MD  Encounter Date: 2025   Encounter department: 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 male who presents for floaters and seeing blood in left eye.    Patient states he woke up yesterday morning and saw black floaters. Patient states that he can see through them. Patient states they have not gotten any worse. Patient states it seems the same on there right eye that is bleeding.      Patient states vision has changed.  History obtained from: patient    Review of Systems  Medical History Reviewed by provider this encounter:  Tobacco  Allergies  Meds  Problems  Med Hx  Surg Hx  Fam Hx     .     Past Ocular History: VH OD, PDR OU   Ocular Meds/Drops: Avastin OD #2     Base Eye Exam       Visual Acuity (Snellen - Linear)         Right Left    Dist sc 20/60-1 20/250-1    Dist ph sc NI NI              Tonometry (Tonopen, 11:17 AM)         Right Left    Pressure 19 16              Pupils         Dark APD    Right  None    Left irregular               Visual Fields         Left Right     Full Full              Extraocular Movement         Right Left     Full Full              Neuro/Psych       Oriented x3: Yes    Mood/Affect: Normal              Dilation       Both eyes: 2.5% Phenylephrine, 1%  Tropicamide @ 11:17 AM                  Slit Lamp and Fundus Exam       External Exam         Right Left    External Normal Normal              Slit Lamp Exam         Right Left    Lids/Lashes Normal Normal    Conjunctiva/Sclera White and quiet White and quiet    Cornea Clear Clear    Anterior Chamber Deep and quiet Deep and quiet    Iris Round and reactive Round and reactive    Lens PCIOL with open capsule PCIOL with open capsule    Anterior Vitreous Trace vitreous hemorrhage 1+  vit heme              Fundus Exam         Right Left    Disc sharp, no pallor sharp, no pallor    C/D Ratio 0.2 0.2    Macula flat; MA's flat;    Vessels normal in caliber normal in caliber    Periphery Flat, no retinal tear or detachment, no masses; dot heme; light PRP Flat, no retinal tear or detachment, no masses; light PRP                      IMAGING:  OCT, Retina - OU - Both Eyes          Right Eye  Quality was good. Findings include intraretinal fluid, PVD.     Left Eye  Quality was good. Findings include intraretinal fluid, PVD (Vitreous hemorrhage).          Intravitreal Injection, Pharmacologic Agent - OS - Left Eye          Time Out  6/26/2025. 11:44 AM. Confirmed correct patient, procedure, site, and patient consented.     Anesthesia  Topical anesthesia was used. Anesthetic medications included Proparacaine 0.5%, Tetracaine 0.5%.     Procedure  Preparation included 5% betadine to ocular surface, eyelid speculum. A 30 gauge needle was used.     Injection:  1.25 mg bevacizumab 1.25 MG/0.05ML    Route: Intravitreal, Site: Left Eye    NDC: [address]-6, Lot: [id number], Expiration date: 9/14/2025     Post-op  Post injection exam found visual acuity of at least counting fingers, no retinal detachment, perfused optic nerve. The patient tolerated the procedure well. There were no complications. The patient received written and verbal post procedure care education. Post injection medications were not given.

## 2025-06-30 RX ORDER — BEVACIZUMAB 1.25MG/.05
1.25 SYRINGE (ML) INTRAOCULAR
Status: COMPLETED | OUTPATIENT
Start: 2025-06-30 | End: 2025-06-30

## 2025-06-30 RX ADMIN — Medication 1.25 MG: at 09:20

## 2025-07-01 ENCOUNTER — EVALUATION (OUTPATIENT)
Facility: REHABILITATION | Age: 65
End: 2025-07-01
Attending: FAMILY MEDICINE
Payer: COMMERCIAL

## 2025-07-01 DIAGNOSIS — Z89.611 S/P AKA (ABOVE KNEE AMPUTATION), RIGHT (HCC): ICD-10-CM

## 2025-07-01 DIAGNOSIS — Z89.512 S/P BKA (BELOW KNEE AMPUTATION), LEFT (HCC): ICD-10-CM

## 2025-07-01 PROCEDURE — 97110 THERAPEUTIC EXERCISES: CPT

## 2025-07-01 PROCEDURE — 97162 PT EVAL MOD COMPLEX 30 MIN: CPT

## 2025-07-02 NOTE — PLAN OF CARE
Problem: Prexisting or High Potential for Compromised Skin Integrity  Goal: Skin integrity is maintained or improved  INTERVENTIONS:  - Identify patients at risk for skin breakdown  - Assess and monitor skin integrity  - Assess and monitor nutrition and hydration status  - Monitor labs (i e  albumin)  - Assess for incontinence   - Turn and reposition patient  - Assist with mobility/ambulation  - Relieve pressure over bony prominences  - Avoid friction and shearing  - Provide appropriate hygiene as needed including keeping skin clean and dry  - Evaluate need for skin moisturizer/barrier cream  - Collaborate with interdisciplinary team (i e  Nutrition, Rehabilitation, etc )   - Patient/family teaching   Outcome: Progressing      Problem: Potential for Falls  Goal: Patient will remain free of falls  INTERVENTIONS:  - Assess patient frequently for physical needs  -  Identify cognitive and physical deficits and behaviors that affect risk of falls    -  [place] fall precautions as indicated by assessment   - Educate patient/family on patient safety including physical limitations  - Instruct patient to call for assistance with activity based on assessment  - Modify environment to reduce risk of injury  - Consider OT/PT consult to assist with strengthening/mobility   Outcome: Progressing      Problem: INFECTION - ADULT  Goal: Absence or prevention of progression during hospitalization  INTERVENTIONS:  - Assess and monitor for signs and symptoms of infection  - Monitor lab/diagnostic results  - Monitor all insertion sites, i e  indwelling lines, tubes, and drains  - Monitor endotracheal (as able) and nasal secretions for changes in amount and color  - [place] appropriate cooling/warming therapies per order  - Administer medications as ordered  - Instruct and encourage patient and family to use good hand hygiene technique  - Identify and instruct in appropriate isolation precautions for identified infection/condition Outcome: Progressing    Goal: Absence of fever/infection during neutropenic period  INTERVENTIONS:  - Monitor WBC  - Implement neutropenic guidelines   Outcome: Progressing      Problem: SAFETY ADULT  Goal: Maintain or return to baseline ADL function  INTERVENTIONS:  -  Assess patient's ability to carry out ADLs; assess patient's baseline for ADL function and identify physical deficits which impact ability to perform ADLs (bathing, care of mouth/teeth, toileting, grooming, dressing, etc )  - Assess/evaluate cause of self-care deficits   - Assess range of motion  - Assess patient's mobility; develop plan if impaired  - Assess patient's need for assistive devices and provide as appropriate  - Encourage maximum independence but intervene and supervise when necessary  ¯ Involve family in performance of ADLs  ¯ Assess for home care needs following discharge   ¯ Request OT consult to assist with ADL evaluation and planning for discharge  ¯ Provide patient education as appropriate   Outcome: Progressing    Goal: Maintain or return mobility status to optimal level  INTERVENTIONS:  - Assess patient's baseline mobility status (ambulation, transfers, stairs, etc )    - Identify cognitive and physical deficits and behaviors that affect mobility  - Identify mobility aids required to assist with transfers and/or ambulation (gait belt, sit-to-stand, lift, walker, cane, etc )  - [place] fall precautions as indicated by assessment  - Record patient progress and toleration of activity level on Mobility SBAR; progress patient to next Phase/Stage  - Instruct patient to call for assistance with activity based on assessment  - Request Rehabilitation consult to assist with strengthening/weightbearing, etc    Outcome: Progressing      Problem: DISCHARGE PLANNING  Goal: Discharge to home or other facility with appropriate resources  INTERVENTIONS:  - Identify barriers to discharge w/patient and caregiver  - Arrange for needed discharge resources and transportation as appropriate  - Identify discharge learning needs (meds, wound care, etc )  - Arrange for interpretive services to assist at discharge as needed  - Refer to Case Management Department for coordinating discharge planning if the patient needs post-hospital services based on physician/advanced practitioner order or complex needs related to functional status, cognitive ability, or social support system   Outcome: Progressing      Problem: Knowledge Deficit  Goal: Patient/family/caregiver demonstrates understanding of disease process, treatment plan, medications, and discharge instructions  Complete learning assessment and assess knowledge base    Interventions:  - Provide teaching at level of understanding  - Provide teaching via preferred learning methods   Outcome: Progressing      Problem: SKIN/TISSUE INTEGRITY - ADULT  Goal: Skin integrity remains intact  INTERVENTIONS  - Identify patients at risk for skin breakdown  - Assess and monitor skin integrity  - Assess and monitor nutrition and hydration status  - Monitor labs (i e  albumin)  - Assess for incontinence   - Turn and reposition patient  - Assist with mobility/ambulation  - Relieve pressure over bony prominences  - Avoid friction and shearing  - Provide appropriate hygiene as needed including keeping skin clean and dry  - Evaluate need for skin moisturizer/barrier cream  - Collaborate with interdisciplinary team (i e  Nutrition, Rehabilitation, etc )   - Patient/family teaching   Outcome: Progressing    Goal: Incision(s), wounds(s) or drain site(s) healing without S/S of infection  INTERVENTIONS  - Assess and document risk factors for skin impairment   - Assess and document dressing, incision, wound bed, drain sites and surrounding tissue  - Initiate Nutrition services consult and/or wound management as needed   Outcome: Progressing      Problem: Nutrition/Hydration-ADULT  Goal: Nutrient/Hydration intake appropriate for improving, restoring or maintaining nutritional needs  Monitor and assess patient's nutrition/hydration status for malnutrition (ex- brittle hair, bruises, dry skin, pale skin and conjunctiva, muscle wasting, smooth red tongue, and disorientation)  Collaborate with interdisciplinary team and initiate plan and interventions as ordered  Monitor patient's weight and dietary intake as ordered or per policy  Utilize nutrition screening tool and intervene per policy  Determine patient's food preferences and provide high-protein, high-caloric foods as appropriate       INTERVENTIONS:  - Monitor oral intake, urinary output, labs, and treatment plans  - Assess nutrition and hydration status and recommend course of action  - Evaluate amount of meals eaten  - Assist patient with eating if necessary   - Allow adequate time for meals  - Recommend/ encourage appropriate diets, oral nutritional supplements, and vitamin/mineral supplements  - Order, calculate, and assess calorie counts as needed  - Recommend, monitor, and adjust tube feedings and TPN/PPN based on assessed needs  - Assess need for intravenous fluids  - Provide specific nutrition/hydration education as appropriate  - Include patient/family/caregiver in decisions related to nutrition   Outcome: Progressing      Problem: DISCHARGE PLANNING - CARE MANAGEMENT  Goal: Discharge to post-acute care or home with appropriate resources  INTERVENTIONS:  - Conduct assessment to determine patient/family and health care team treatment goals, and need for post-acute services based on payer coverage, community resources, and patient preferences, and barriers to discharge  - Address psychosocial, clinical, and financial barriers to discharge as identified in assessment in conjunction with the patient/family and health care team  - Arrange appropriate level of post-acute services according to patients   needs and preference and payer coverage in collaboration with the physician and health care team  - Communicate with and update the patient/family, physician, and health care team regarding progress on the discharge plan  - Arrange appropriate transportation to post-acute venues   Outcome: Progressing Male

## 2025-07-07 ENCOUNTER — PROCEDURE VISIT (OUTPATIENT)
Age: 65
End: 2025-07-07

## 2025-07-07 DIAGNOSIS — E11.3593 PROLIFERATIVE DIABETIC RETINOPATHY OF BOTH EYES WITHOUT MACULAR EDEMA ASSOCIATED WITH TYPE 2 DIABETES MELLITUS (HCC): Primary | ICD-10-CM

## 2025-07-07 RX ORDER — BEVACIZUMAB 1.25MG/.05
1.25 SYRINGE (ML) INTRAOCULAR
Status: COMPLETED | OUTPATIENT
Start: 2025-07-07 | End: 2025-07-07

## 2025-07-07 RX ADMIN — Medication 1.25 MG: at 09:59

## 2025-07-07 NOTE — PROGRESS NOTES
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 male who presents for Avastin injection OD DFE OU.    Patient states left eye has improved since last visit.    Patient states he feels that there is more blood in right eye. Patient thinks vision has changed slightly in right eye.    History obtained from: patient    Review of Systems  Medical History Reviewed by provider this encounter:     .     Past Ocular History:  VH OD, PDR OU   Ocular Meds/Drops:  Avastin OD #2     Base Eye Exam       Visual Acuity (Snellen - Linear)         Right Left    Dist sc 20/100 20/200+1    Dist ph sc NI 20/150+1              Tonometry (Tonopen, 9:28 AM)         Right Left    Pressure 16 13              Pupils         Dark APD    Right  None    Left irregular               Visual Fields         Left Right     Full Full              Extraocular Movement         Right Left     Full Full              Neuro/Psych       Oriented x3: Yes    Mood/Affect: Normal              Dilation       Both eyes: 2.5% Phenylephrine, 1% Tropicamide @ 9:29 AM                  Slit Lamp and Fundus Exam       External Exam         Right Left    External Normal Normal              Slit Lamp Exam         Right Left    Lids/Lashes Normal Normal    Conjunctiva/Sclera White and quiet White and quiet    Cornea Clear Clear    Anterior Chamber Deep and  quiet Deep and quiet    Iris Round and reactive Round and reactive    Lens PCIOL with open capsule PCIOL with open capsule    Anterior Vitreous Trace vitreous hemorrhage 1+  vit heme                      IMAGING:  Intravitreal Injection, Pharmacologic Agent - OD - Right Eye          Time Out  7/7/2025. 9:43 AM. Confirmed correct patient, procedure, site, and patient consented.     Anesthesia  Topical anesthesia was used. Anesthetic medications included Proparacaine 0.5%, Tetracaine 0.5%.     Procedure  Preparation included 5% betadine to ocular surface, eyelid speculum. A 30 gauge needle was used.     Injection:  1.25 mg bevacizumab 1.25 MG/0.05ML    Route: Intravitreal, Site: Right Eye    NDC: [address]-0, Lot: [id number], Expiration date: 10/24/2025     Post-op  Post injection exam found visual acuity of at least counting fingers, no retinal detachment, perfused optic nerve. The patient tolerated the procedure well. There were no complications. The patient received written and verbal post procedure care education. Post injection medications were not given.

## 2025-07-08 ENCOUNTER — APPOINTMENT (OUTPATIENT)
Facility: REHABILITATION | Age: 65
End: 2025-07-08
Attending: FAMILY MEDICINE
Payer: COMMERCIAL

## 2025-07-09 ENCOUNTER — OFFICE VISIT (OUTPATIENT)
Facility: REHABILITATION | Age: 65
End: 2025-07-09
Attending: FAMILY MEDICINE
Payer: COMMERCIAL

## 2025-07-09 DIAGNOSIS — Z89.512 S/P BKA (BELOW KNEE AMPUTATION), LEFT (HCC): Primary | ICD-10-CM

## 2025-07-09 DIAGNOSIS — Z89.611 S/P AKA (ABOVE KNEE AMPUTATION), RIGHT (HCC): ICD-10-CM

## 2025-07-09 PROCEDURE — 97112 NEUROMUSCULAR REEDUCATION: CPT

## 2025-07-09 PROCEDURE — 97110 THERAPEUTIC EXERCISES: CPT

## 2025-07-09 PROCEDURE — 97140 MANUAL THERAPY 1/> REGIONS: CPT

## 2025-07-09 NOTE — HOME EXERCISE EDUCATION
Program_ID:[id number]   Access Code: [email or web address]  URL: [email or web address]  Date: 07-  Prepared By: Sima Caldwell    Program Notes      Exercises      - Supine Quad Set (BKA) - 1 x daily - 7 x weekly - 3 sets - 10 reps - 30sec hold      - Reclined Single Knee to Chest Stretch - 1 x daily - 7 x weekly - 3 sets - 10 reps - 30sec hold      - Sit to Stand with Armchair - 1 x daily - 7 x weekly -  sets - 10 reps      - Wide Stance with Counter Support - 1 x daily - 7 x weekly - 3 sets -  reps - 5 min hold

## 2025-07-09 NOTE — PROGRESS NOTES
"Daily Note     Today's date: 2025  Patient name: Abner King  : 1960  MRN: [id number]  Referring provider: Manny Viramontes DO  Dx:   Encounter Diagnosis     ICD-10-CM    1. S/P BKA (below knee amputation), left (HCC)  Z89.512       2. S/P AKA (above knee amputation), right (HCC)  Z89.611                      Subjective: Had Intravitreal Injection on Monday, was advised to limit \"heavy activity for a few day\". Presents in manual WC today. Is scheduled to see prosthetist on 7/15 for adjustments to socket as pt experiences skin irritation when sitting for too long with prosthetics donned, particularly on R.      Objective: See treatment diary below      Assessment: Tolerated treatment well. Session focused on further assessment of B hip and L knee joint ROM and dynamic sitting balance. Significant contractures noted in B hips and L knee. Passive stretching performed today. Pt educated on importance of completing stretches at home, PT and pt problem solved how to achieve hip flexor and knee extension stretches. Also reviewed need for increased time in upright and standing positions to improve BP responses, leg strength, and balance. Handout of exercises assembled with modifications explained in test; pt declined need for handout. Exercises saved under [email or web address] and linked to pt's chart in Epic. Pt would likely benefit from reinforcement on HEP importance in follow-up visits. Pt expressed desires to walk today, however, held due to some lightheadedness and time constraints. Limbs inspected today w/ prosthetics removed and no significant areas of redness noted, some diffuse pink color in posterior aspect of R thigh noted upon initial removal of prosthetic, however coloring returned to normal w/I 2 min of having limb removed. Pt is scheduled to see prosthetist prior to next PT visit. Consider ambulation trial NV w/ SOLO pending BP response and prosthetic fit.  Patient would benefit from continued PT      Plan: " Continue per plan of care.  Progress treatment as tolerated.         ROM - Hip Left Right   Extension  -25 degrees   -28 degrees      ROM - Knee Left Right   Flexion WNL NA   Extension -25 degrees AROM   -18 degrees PROM NA          Precautions: DMII, PAD, CHF, CAD, GERD, polyneuropathy, CABGx3, L BKA (), R AKA (), h/o vitreous hemorrhage      * indicates included in HEP:   Access Code: [email or web address]  URL: [email or web address]  Date: 2025  Prepared by: Sima Caldwell      Vitals - EVERY VISIT              BP L arm/autocuff  Seated at start: 112/53 mmHg    Seated after 1min of standin/50 mmHg L arm/autocuff  Seated at start: 122/56 mmHg           Manuals  X15min total           Hip flexor stretch NV VR            Knee extension stretch NV VR           Hip adductor stretch  VR                        Neuro Re-Ed SOLO            Outcome measures   ROM measurements TUG ?           Static balance             Dynamic balance                                                                               Ther Ex             Pt education POC HEP, increased activity at home           Hip flexor stretch  NV - add to HEP            Knee extension stretch NV - add to HEP            Core strengthening  Unsupported EOM w/ trunk rotations to move cones side to side x10 b/l           TB rows 2 ways   Unsupported EOM  GTB 2x10 ea           TB tricep pull downs   NV           Bicep curls                                        Ther Activity             Mat <> WC x1 x1           STS X3 w/ RW                         Gait Training SOLO            W/ RW  NV ?            Curb/step negotiation                           Modalities

## 2025-07-15 ENCOUNTER — APPOINTMENT (OUTPATIENT)
Facility: REHABILITATION | Age: 65
End: 2025-07-15
Attending: FAMILY MEDICINE
Payer: COMMERCIAL

## 2025-07-16 ENCOUNTER — APPOINTMENT (OUTPATIENT)
Facility: REHABILITATION | Age: 65
End: 2025-07-16
Attending: FAMILY MEDICINE
Payer: COMMERCIAL

## 2025-07-16 ENCOUNTER — OFFICE VISIT (OUTPATIENT)
Facility: REHABILITATION | Age: 65
End: 2025-07-16
Attending: FAMILY MEDICINE
Payer: COMMERCIAL

## 2025-07-16 DIAGNOSIS — Z89.611 S/P AKA (ABOVE KNEE AMPUTATION), RIGHT (HCC): ICD-10-CM

## 2025-07-16 DIAGNOSIS — Z89.512 S/P BKA (BELOW KNEE AMPUTATION), LEFT (HCC): Primary | ICD-10-CM

## 2025-07-16 PROCEDURE — 97110 THERAPEUTIC EXERCISES: CPT | Performed by: PHYSICAL THERAPIST

## 2025-07-16 PROCEDURE — 97530 THERAPEUTIC ACTIVITIES: CPT | Performed by: PHYSICAL THERAPIST

## 2025-07-16 PROCEDURE — 97112 NEUROMUSCULAR REEDUCATION: CPT | Performed by: PHYSICAL THERAPIST

## 2025-07-16 NOTE — PROGRESS NOTES
Daily Note     Today's date: 2025  Patient name: Abner King  : 1960  MRN: [id number]  Referring provider: Manny Viramontes DO  Dx:   Encounter Diagnosis     ICD-10-CM    1. S/P BKA (below knee amputation), left (HCC)  Z89.512       2. S/P AKA (above knee amputation), right (HCC)  Z89.611             Start Time: 1115  Stop Time: 1200  Total time in clinic (min): 45 minutes    Subjective: Reports performed 2 x 15 sets of UE strengthening 3 x/week. Droothy, his caregiver was present during session.      Objective: See treatment diary below  138/70 BP    Assessment: Tolerated treatment well.  He was seen by Encouraged in increased activity and mobility. Responded well to moving, Vital signs stable.  Able to complete 2 MWT and Tug with rolling walker today.  Patient would benefit from continued PT      Plan: Continue per plan of care.  Progress treatment as tolerated.         Precautions: DMII, PAD, CHF, CAD, GERD, polyneuropathy, CABGx3, L BKA (), R AKA (), h/o vitreous hemorrhage      * indicates included in HEP:   Access Code: [email or web address]  URL: [email or web address]  Date: 2025  Prepared by: Sima Caldwell      Vitals - EVERY VISIT           BP L arm/autocuff  Seated at start: 112/53 mmHg    Seated after 1min of standin/50 mmHg L arm/autocuff  Seated at start: 122/56 mmHg Outcome measures         Manuals  X15min total          Hip flexor stretch NV VR           Knee extension stretch NV VR          Hip adductor stretch  VR                      Neuro Re-Ed SOLO           Outcome measures   ROM measurements 100' solostep  2 '  2:30 tolerance  /72    TUG 58.70 with rolling walker                Static balance   Standing  :20 x 1 with sc    :58 x1 no device           Dynamic balance     Stand  walk turn and sit            100' x 2            75' x 1                                             Ther Ex            Pt education POC HEP, increased activity at  home          Hip flexor stretch  NV - add to HEP           Knee extension stretch NV - add to HEP           Core strengthening  Unsupported EOM w/ trunk rotations to move cones side to side x10 b/l          TB rows 2 ways   Unsupported EOM  GTB 2x10 ea          TB tricep pull downs   NV          Bicep curls                                     Ther Activity            Mat <> WC x1 x1          STS X3 w/ RW           Caregiver education            Gait Training SOLO           W/ RW  NV ?           Curb/step negotiation                         Modalities

## 2025-07-21 ENCOUNTER — OFFICE VISIT (OUTPATIENT)
Facility: REHABILITATION | Age: 65
End: 2025-07-21
Attending: FAMILY MEDICINE
Payer: COMMERCIAL

## 2025-07-21 DIAGNOSIS — Z89.611 S/P AKA (ABOVE KNEE AMPUTATION), RIGHT (HCC): ICD-10-CM

## 2025-07-21 DIAGNOSIS — Z89.512 S/P BKA (BELOW KNEE AMPUTATION), LEFT (HCC): Primary | ICD-10-CM

## 2025-07-21 PROCEDURE — 97530 THERAPEUTIC ACTIVITIES: CPT | Performed by: PHYSICAL THERAPIST

## 2025-07-21 PROCEDURE — 97116 GAIT TRAINING THERAPY: CPT | Performed by: PHYSICAL THERAPIST

## 2025-07-21 PROCEDURE — 97110 THERAPEUTIC EXERCISES: CPT | Performed by: PHYSICAL THERAPIST

## 2025-07-21 NOTE — PROGRESS NOTES
Daily Note     Today's date: 2025  Patient name: Abner King  : 1960  MRN: [id number]  Referring provider: Manny Viramontes DO  Dx:   Encounter Diagnosis     ICD-10-CM    1. S/P BKA (below knee amputation), left (HCC)  Z89.512       2. S/P AKA (above knee amputation), right (HCC)  Z89.611               Start Time: 1230  Stop Time: 1315  Total time in clinic (min): 45 minutes    Subjective: I want to work on my core.    Objective: See treatment diary below  112/70 BP  HR    Assessment: Tolerated treatment well.  Requires encouragement for increased activity and mobility. Discussed having his Home health aide present for next session to perform caregiver education in mat program.  UE push off were initiated today + difficulty maintaining above 20 seconds.  Improving walking endurance, decreased resting periods required.  Backwards walking initiated today with considerable decreased gerard. Patient would benefit from continued PT      Plan: Continue per plan of care.  Progress treatment as tolerated.         Precautions: DMII, PAD, CHF, CAD, GERD, polyneuropathy, CABGx3, L BKA (), R AKA (), h/o vitreous hemorrhage      * indicates included in HEP:   Access Code: [email or web address]  URL: [email or web address]  Date: 2025  Prepared by: Sima Caldwell      Vitals - EVERY VISIT          BP L arm/autocuff  Seated at start: 112/53 mmHg    Seated after 1min of standin/50 mmHg L arm/autocuff  Seated at start: 122/56 mmHg Outcome measures         Manuals  X15min total          Hip flexor stretch NV VR           Knee extension stretch NV VR          Hip adductor stretch  VR                      Neuro Re-Ed SOLO           Outcome measures   ROM measurements 100' solostep  2 '  2:30 tolerance  /72    TUG 58.70 with rolling walker        5 x sit to stand  1:40          Static balance   Standing  :20 x 1 with sc    :58 x1 no device           Dynamic balance     Stand   walk turn and sit Backwards walking             100' x 2 75' X 2            75' x 1 100' x2                                            Ther Ex            Pt education POC HEP, increased activity at home  Importance of ambulation with aide at home as well as performing sit to stand to achieve goal of independence from w/c        Hip flexor stretch  NV - add to HEP           Knee extension stretch NV - add to HEP           Core strengthening  Unsupported EOM w/ trunk rotations to move cones side to side x10 b/l          TB rows 2 ways   Unsupported EOM  GTB 2x10 ea          TB tricep pull downs   NV  Chair push ups  :10 x 1  :20 x 3        Bicep curls             Step ups                        Ther Activity            Mat <> WC x1 x1          STS X3 w/ RW           Caregiver education            Gait Training SOLO           W/ RW  NV ?           Curb/step negotiation                         Modalities

## 2025-07-22 DIAGNOSIS — I25.10 CORONARY ARTERY DISEASE INVOLVING NATIVE CORONARY ARTERY OF NATIVE HEART WITHOUT ANGINA PECTORIS: ICD-10-CM

## 2025-07-23 ENCOUNTER — OFFICE VISIT (OUTPATIENT)
Facility: REHABILITATION | Age: 65
End: 2025-07-23
Attending: FAMILY MEDICINE
Payer: COMMERCIAL

## 2025-07-23 DIAGNOSIS — Z89.512 S/P BKA (BELOW KNEE AMPUTATION), LEFT (HCC): Primary | ICD-10-CM

## 2025-07-23 DIAGNOSIS — Z89.611 S/P AKA (ABOVE KNEE AMPUTATION), RIGHT (HCC): ICD-10-CM

## 2025-07-23 PROCEDURE — 97530 THERAPEUTIC ACTIVITIES: CPT | Performed by: PHYSICAL THERAPIST

## 2025-07-23 PROCEDURE — 97110 THERAPEUTIC EXERCISES: CPT | Performed by: PHYSICAL THERAPIST

## 2025-07-23 PROCEDURE — 97116 GAIT TRAINING THERAPY: CPT | Performed by: PHYSICAL THERAPIST

## 2025-07-23 NOTE — PROGRESS NOTES
"Daily Note     Today's date: 2025  Patient name: Abner King  : 1960  MRN: [id number]  Referring provider: Manny Viramontes DO  Dx:   Encounter Diagnosis     ICD-10-CM    1. S/P BKA (below knee amputation), left (HCC)  Z89.512       2. S/P AKA (above knee amputation), right (HCC)  Z89.611               Start Time: 1000  Stop Time: 1100  Total time in clinic (min): 60 minutes    Subjective: \"My aide is not interested in learning about the stretches, it is not her job.\" Aide reports Abner is getting around much easier in terms of standing up. She is there for 4 hours, she does not see him walking too much, \"but he might be walking when she is not around.\"    Objective: See treatment diary below      Vitals - EVERY VISIT         BP L arm/autocuff  Seated at start: 112/53 mmHg    Seated after 1min of standin/50 mmHg L arm/autocuff  Seated at start: 122/56 mmHg Outcome measures         Manuals  X15min total          Hip flexor stretch NV VR           Knee extension stretch NV VR          Hip adductor stretch  VR                      Neuro Re-Ed SOLO           Outcome measures   ROM measurements 100' solostep  2 '  2:30 tolerance  /72    TUG 58.70 with rolling walker 5 x sit to stand  1:40          Static balance   Standing  :20 x 1 with sc    :58 x1 no device           Dynamic balance     Stand  walk turn and sit Backwards walking             100' x 2 75' X 2            75' x 1 100' x2 100' x 2  50'=:45,:38,:45                                           Ther Ex            Pt education POC HEP, increased activity at home  Importance of ambulation with aide at home as well as performing sit to stand to achieve goal of independence from w/c Continue with stretches at home       Hip flexor stretch  NV - add to HEP    Reviewed HEP to be done in his recliner laying all the way back. Patient verbalized understanding       Knee extension stretch NV - add to HEP           Core " "strengthening  Unsupported EOM w/ trunk rotations to move cones side to side x10 b/l          TB rows 2 ways   Unsupported EOM  GTB 2x10 ea          TB tricep pull downs   NV  Chair push ups  :10 x 1  :20 x 3        Step tap     Left leading 10x 4\" step  Decreae UE pulling       Ther Activity            Mat <> WC x1 x1          STS X3 w/ RW    8x throughout session       Caregiver education     declined       Gait Training SOLO    Skin inspection Left residual, no redness or pressure points. Right residual held due to transport arrival encouraged checking at home.       W/ RW  NV ?           Curb/step negotiation      Stair system  Step to pattern  1 set.                   Modalities                                        Assessment: Tolerated treatment well.  Reviewed established HEP that he does at home for proper technique. Printed out HEP and discussed technique. Patient voices understanding.  Continue to encourage ambulation during the time his aide is present so safety reasons. Held aide education per patient request.  Improving walking endurance, decreased resting periods required.  Endurance training performed in [employer]. Patient would benefit from continued PT      Plan: Continue per plan of care.  Progress treatment as tolerated.         Precautions: DMII, PAD, CHF, CAD, GERD, polyneuropathy, CABGx3, L BKA (2018), R AKA (2023), h/o vitreous hemorrhage      * indicates included in HEP:   Access Code: [email or web address]  URL: [email or web address]  Date: 07/09/2025  Prepared by: Sima Caldwell         "

## 2025-07-24 RX ORDER — ATORVASTATIN CALCIUM 80 MG/1
80 TABLET, FILM COATED ORAL DAILY
Qty: 90 TABLET | Refills: 3 | Status: SHIPPED | OUTPATIENT
Start: 2025-07-24

## 2025-07-28 ENCOUNTER — OFFICE VISIT (OUTPATIENT)
Facility: REHABILITATION | Age: 65
End: 2025-07-28
Attending: FAMILY MEDICINE
Payer: COMMERCIAL

## 2025-07-28 DIAGNOSIS — Z89.611 S/P AKA (ABOVE KNEE AMPUTATION), RIGHT (HCC): ICD-10-CM

## 2025-07-28 DIAGNOSIS — Z89.512 S/P BKA (BELOW KNEE AMPUTATION), LEFT (HCC): Primary | ICD-10-CM

## 2025-07-28 PROCEDURE — 97116 GAIT TRAINING THERAPY: CPT

## 2025-07-28 PROCEDURE — 97110 THERAPEUTIC EXERCISES: CPT

## 2025-07-29 ENCOUNTER — PROCEDURE VISIT (OUTPATIENT)
Age: 65
End: 2025-07-29
Payer: COMMERCIAL

## 2025-07-29 DIAGNOSIS — E11.3593 PROLIFERATIVE DIABETIC RETINOPATHY OF BOTH EYES WITHOUT MACULAR EDEMA ASSOCIATED WITH TYPE 2 DIABETES MELLITUS (HCC): Primary | ICD-10-CM

## 2025-07-29 DIAGNOSIS — H43.13: ICD-10-CM

## 2025-07-29 PROCEDURE — C9257 BEVACIZUMAB INJECTION: HCPCS | Performed by: OPHTHALMOLOGY

## 2025-07-29 PROCEDURE — 92134 CPTRZ OPH DX IMG PST SGM RTA: CPT | Performed by: OPHTHALMOLOGY

## 2025-07-29 PROCEDURE — 92012 INTRM OPH EXAM EST PATIENT: CPT | Performed by: OPHTHALMOLOGY

## 2025-07-29 PROCEDURE — 67028 INJECTION EYE DRUG: CPT | Performed by: OPHTHALMOLOGY

## 2025-07-29 RX ORDER — BEVACIZUMAB 1.25MG/.05
1.25 SYRINGE (ML) INTRAOCULAR
Status: COMPLETED | OUTPATIENT
Start: 2025-07-29 | End: 2025-07-29

## 2025-07-29 RX ADMIN — Medication 1.25 MG: at 11:49

## 2025-07-31 ENCOUNTER — APPOINTMENT (OUTPATIENT)
Facility: REHABILITATION | Age: 65
End: 2025-07-31
Attending: FAMILY MEDICINE
Payer: COMMERCIAL

## 2025-08-04 ENCOUNTER — OFFICE VISIT (OUTPATIENT)
Facility: REHABILITATION | Age: 65
End: 2025-08-04
Attending: FAMILY MEDICINE
Payer: COMMERCIAL

## 2025-08-04 DIAGNOSIS — Z89.512 S/P BKA (BELOW KNEE AMPUTATION), LEFT (HCC): Primary | ICD-10-CM

## 2025-08-04 DIAGNOSIS — Z89.611 S/P AKA (ABOVE KNEE AMPUTATION), RIGHT (HCC): ICD-10-CM

## 2025-08-04 PROCEDURE — 97112 NEUROMUSCULAR REEDUCATION: CPT | Performed by: PHYSICAL THERAPIST

## 2025-08-04 PROCEDURE — 97112 NEUROMUSCULAR REEDUCATION: CPT

## 2025-08-04 PROCEDURE — 97530 THERAPEUTIC ACTIVITIES: CPT

## 2025-08-06 ENCOUNTER — OFFICE VISIT (OUTPATIENT)
Facility: REHABILITATION | Age: 65
End: 2025-08-06
Attending: FAMILY MEDICINE
Payer: COMMERCIAL

## 2025-08-06 DIAGNOSIS — Z89.512 S/P BKA (BELOW KNEE AMPUTATION), LEFT (HCC): Primary | ICD-10-CM

## 2025-08-06 DIAGNOSIS — Z79.4 TYPE 2 DIABETES MELLITUS WITH HYPERGLYCEMIA, WITH LONG-TERM CURRENT USE OF INSULIN (HCC): ICD-10-CM

## 2025-08-06 DIAGNOSIS — E11.65 TYPE 2 DIABETES MELLITUS WITH HYPERGLYCEMIA, WITH LONG-TERM CURRENT USE OF INSULIN (HCC): ICD-10-CM

## 2025-08-06 DIAGNOSIS — Z89.611 S/P AKA (ABOVE KNEE AMPUTATION), RIGHT (HCC): ICD-10-CM

## 2025-08-06 PROCEDURE — 97116 GAIT TRAINING THERAPY: CPT | Performed by: PHYSICAL THERAPIST

## 2025-08-06 PROCEDURE — 97530 THERAPEUTIC ACTIVITIES: CPT | Performed by: PHYSICAL THERAPIST

## 2025-08-11 ENCOUNTER — OFFICE VISIT (OUTPATIENT)
Facility: REHABILITATION | Age: 65
End: 2025-08-11
Attending: FAMILY MEDICINE
Payer: COMMERCIAL

## 2025-08-12 ENCOUNTER — TELEPHONE (OUTPATIENT)
Age: 65
End: 2025-08-12

## 2025-08-14 ENCOUNTER — OFFICE VISIT (OUTPATIENT)
Facility: REHABILITATION | Age: 65
End: 2025-08-14
Attending: FAMILY MEDICINE
Payer: COMMERCIAL

## 2025-08-17 DIAGNOSIS — Z95.1 S/P CABG (CORONARY ARTERY BYPASS GRAFT): ICD-10-CM

## 2025-08-17 DIAGNOSIS — R33.9 URINARY RETENTION: ICD-10-CM

## 2025-08-19 ENCOUNTER — OFFICE VISIT (OUTPATIENT)
Facility: REHABILITATION | Age: 65
End: 2025-08-19
Attending: FAMILY MEDICINE
Payer: COMMERCIAL

## 2025-08-19 DIAGNOSIS — Z89.512 S/P BKA (BELOW KNEE AMPUTATION), LEFT (HCC): Primary | ICD-10-CM

## 2025-08-19 DIAGNOSIS — Z89.611 S/P AKA (ABOVE KNEE AMPUTATION), RIGHT (HCC): ICD-10-CM

## 2025-08-19 PROCEDURE — 97530 THERAPEUTIC ACTIVITIES: CPT | Performed by: PHYSICAL THERAPIST

## 2025-08-19 PROCEDURE — 97112 NEUROMUSCULAR REEDUCATION: CPT | Performed by: PHYSICAL THERAPIST

## 2025-08-19 PROCEDURE — 97116 GAIT TRAINING THERAPY: CPT | Performed by: PHYSICAL THERAPIST

## 2025-08-19 RX ORDER — FINASTERIDE 5 MG/1
5 TABLET, FILM COATED ORAL DAILY
Qty: 100 TABLET | Refills: 1 | Status: SHIPPED | OUTPATIENT
Start: 2025-08-19

## 2025-08-19 RX ORDER — TAMSULOSIN HYDROCHLORIDE 0.4 MG/1
0.4 CAPSULE ORAL
Qty: 100 CAPSULE | Refills: 1 | Status: SHIPPED | OUTPATIENT
Start: 2025-08-19

## 2025-08-20 ENCOUNTER — TELEPHONE (OUTPATIENT)
Age: 65
End: 2025-08-20

## (undated) DEVICE — DRESSING XEROFORM 5 X 9

## (undated) DEVICE — SUT PROLENE 7-0 BV-1/BV-1 24 IN 8304H

## (undated) DEVICE — OASIS DRAIN, DUAL, IN-LINE, ATS COMPATIBLE: Brand: OASIS

## (undated) DEVICE — BLANKET HYPOTHERMIA ADULT GAYMAR

## (undated) DEVICE — SUT SILK 2-0 30 IN A305H

## (undated) DEVICE — UMBILICAL TAPE: Brand: DEROYAL

## (undated) DEVICE — GLOVE SRG BIOGEL 7.5

## (undated) DEVICE — SPONGE LAP 18 X 18 IN

## (undated) DEVICE — GLOVE SRG BIOGEL ECLIPSE 7.5

## (undated) DEVICE — SUT VICRYL 2-0 CT-2 27 IN J269H

## (undated) DEVICE — ALCON OPHTHALMIC KNIFE 15 °: Brand: ALCON

## (undated) DEVICE — SUT ETHILON 2-0 FSLX 30 IN 1674H

## (undated) DEVICE — BUTTON SWITCH PENCIL HOLSTER: Brand: VALLEYLAB

## (undated) DEVICE — INTENDED FOR TISSUE SEPARATION, AND OTHER PROCEDURES THAT REQUIRE A SHARP SURGICAL BLADE TO PUNCTURE OR CUT.: Brand: BARD-PARKER ® CARBON RIB-BACK BLADES

## (undated) DEVICE — PLUMEPEN PRO 10FT

## (undated) DEVICE — HEART SUPPORT

## (undated) DEVICE — SORIN VEIN DISTENTION KIT

## (undated) DEVICE — INTENDED FOR TISSUE SEPARATION, AND OTHER PROCEDURES THAT REQUIRE A SHARP SURGICAL BLADE TO PUNCTURE OR CUT.: Brand: BARD-PARKER SAFETY BLADES SIZE 15, STERILE

## (undated) DEVICE — SUT PROLENE 7-0 BV175-8/BV175-8 24 IN EPM8747

## (undated) DEVICE — KNEE IMMOBILIZER

## (undated) DEVICE — SUT MONOCRYL PLUS 3-0 PS-2 27 IN MCP427H

## (undated) DEVICE — ACE WRAP 6 IN UNSTERILE

## (undated) DEVICE — 32 FR RIGHT ANGLE – SOFT PVC CATHETER: Brand: PVC THORACIC CATHETERS

## (undated) DEVICE — INTENDED FOR TISSUE SEPARATION, AND OTHER PROCEDURES THAT REQUIRE A SHARP SURGICAL BLADE TO PUNCTURE OR CUT.: Brand: BARD-PARKER SAFETY BLADES SIZE 10, STERILE

## (undated) DEVICE — ANTIBACTERIAL UNDYED BRAIDED (POLYGLACTIN 910), SYNTHETIC ABSORBABLE SUTURE: Brand: COATED VICRYL

## (undated) DEVICE — BANDAGE, ESMARK LF STR 6"X9' (20/CS): Brand: CYPRESS

## (undated) DEVICE — MEDI-VAC YANKAUER SUCTION HANDLE W/STRAIGHT TIP & CONTROL VENT: Brand: CARDINAL HEALTH

## (undated) DEVICE — SUT ETHILON 3-0 FSLX 30 IN 1673H

## (undated) DEVICE — SUT PROLENE 4-0 RB-1/RB-1 36 IN 8557H

## (undated) DEVICE — 3000CC GUARDIAN II: Brand: GUARDIAN

## (undated) DEVICE — BLADE BEAVER MINI SZ 69

## (undated) DEVICE — 2108 SERIES SAGITTAL BLADE (18.6 X 0.64 X 61.1MM)

## (undated) DEVICE — CHLORAPREP HI-LITE 26ML ORANGE

## (undated) DEVICE — PROXIMATE SKIN STAPLERS (35 WIDE) CONTAINS 35 STAINLESS STEEL STAPLES (FIXED HEAD): Brand: PROXIMATE

## (undated) DEVICE — SUT VICRYL 0 CT-1 27 IN J260H

## (undated) DEVICE — IMPERVIOUS STOCKINETTE: Brand: DEROYAL

## (undated) DEVICE — EVERGRIP INSERT SET 61MM: Brand: FOGARTY EVERGRIP

## (undated) DEVICE — GLOVE INDICATOR PI UNDERGLOVE SZ 8 BLUE

## (undated) DEVICE — GLOVE INDICATOR PI UNDERGLOVE SZ 6.5 BLUE

## (undated) DEVICE — LIGACLIP MCA MULTIPLE CLIP APPLIERS, 20 SMALL CLIPS: Brand: LIGACLIP

## (undated) DEVICE — PACK CABG PBDS

## (undated) DEVICE — PADDING CAST 4 IN  COTTON STRL

## (undated) DEVICE — PROXIMATE PLUS MD MULTI-DIRECTIONAL RELEASE SKIN STAPLERS CONTAINS 35 STAINLESS STEEL STAPLES APPROXIMATE CLOSED DIMENSIONS: 6.9MM X 3.9MM WIDE: Brand: PROXIMATE

## (undated) DEVICE — CATH STRAIGHT RED RUBBER 20 FR

## (undated) DEVICE — 32 FR STRAIGHT – SOFT PVC CATHETER: Brand: PVC THORACIC CATHETERS

## (undated) DEVICE — GAUZE SPONGES,USP TYPE VII GAUZE, 12 PLY: Brand: CURITY

## (undated) DEVICE — ABDOMINAL PAD: Brand: DERMACEA

## (undated) DEVICE — BONE WAX WHITE: Brand: BONE WAX WHITE

## (undated) DEVICE — SUT PROLENE 6-0 C-1/C-1 30 IN 8307H

## (undated) DEVICE — SILVER-COATED ANTIBACTERIAL BARRIER DRESSING: Brand: ACTICOAT SURGIC 10X12CM 5PK US

## (undated) DEVICE — BETADINE SURGICAL SCRUB 32OZ

## (undated) DEVICE — UNIVERSAL MAJOR EXTREMITY,KIT: Brand: CARDINAL HEALTH

## (undated) DEVICE — SILVER-COATED ANTIBACTERIAL BARRIER DRESSING: Brand: ACTICOAT SURGIC 10X25CM 5PK US

## (undated) DEVICE — SUT VICRYL 0 REEL 54 IN J287G

## (undated) DEVICE — PADDING,UNDERCAST,COTTON, 4"X4YD STERILE: Brand: MEDLINE

## (undated) DEVICE — JP CHANNEL DRAIN, 19FR HUBLESS: Brand: CARDINAL HEALTH

## (undated) DEVICE — MEDI-VAC YANKAUER SUCTION HANDLE W/BULBOUS AND CONTROL VENT: Brand: CARDINAL HEALTH

## (undated) DEVICE — JP CHANNEL DRAIN 19FR, FULL FLUTES: Brand: JACKSON-PRATT

## (undated) DEVICE — AORTIC PUNCH 4.4 MM DISP

## (undated) DEVICE — SUT SILK 2-0 18 IN A185H

## (undated) DEVICE — TUBING SUCTION 5MM X 12 FT

## (undated) DEVICE — STERNAL WIRE

## (undated) DEVICE — GLOVE SRG BIOGEL 6

## (undated) DEVICE — CUFF TOURNIQUET 34 X 4 IN QUICK CONNECT DISP 1BLA

## (undated) DEVICE — THERMOFLECT BLANKET, L, 25EA                               TS THERMOFLECT BLANKET, 48" X 84", SILVER, 5/BG, 5 BG/CS NW: Brand: THERMOFLECT

## (undated) DEVICE — ULTRACLEAN ACCESSORY ELECTRODE 1" (2.54 CM) COATED BLADE: Brand: ULTRACLEAN

## (undated) DEVICE — SUT SILK 2-0 SH 30 IN K833H

## (undated) DEVICE — SUT NONABSORBABLE MYO/WIRE II STERNOTOMY

## (undated) DEVICE — ACE WRAP 6 IN STERILE

## (undated) DEVICE — SUT SILK 3-0 SH 30 IN K832H

## (undated) DEVICE — INTENDED FOR TISSUE SEPARATION, AND OTHER PROCEDURES THAT REQUIRE A SHARP SURGICAL BLADE TO PUNCTURE OR CUT.: Brand: BARD-PARKER SAFETY BLADES SIZE 11, STERILE

## (undated) DEVICE — SUCTION CATH 18 FR

## (undated) DEVICE — SPONGE STICK WITH PVP-I: Brand: KENDALL

## (undated) DEVICE — SUT SILK 0 SH 30 IN K834H

## (undated) DEVICE — ADHESIVE SKN CLSR HISTOACRYL FLEX 0.5ML LF

## (undated) DEVICE — VASOVIEW HEMOPRO 2: Brand: VASOVIEW HEMOPRO 2

## (undated) DEVICE — SUT SILK 0 30 IN A306H

## (undated) DEVICE — HEMOCLIP CARTRIDGE LRG

## (undated) DEVICE — NEEDLE 25G X 1 1/2

## (undated) DEVICE — RECIP.STERNUM SAW BLADE 34/7.5/0.7MM: Brand: AESCULAP

## (undated) DEVICE — SUT VICRYL 3-0 SH 27 IN J416H

## (undated) DEVICE — SPONGE LAP 18 X 18 IN STRL RFD

## (undated) DEVICE — SUT PDS PLUS 1 CTB 36 IN PDPB359T

## (undated) DEVICE — PREVENA INCISION MANAGEMENT SYSTEM- PEEL & PLACE DRESSING: Brand: PREVENA™ PEEL & PLACE™

## (undated) DEVICE — JACKSON-PRATT 100CC BULB RESERVOIR: Brand: CARDINAL HEALTH

## (undated) DEVICE — SUT VICRYL 2-0 REEL 54 IN J286G

## (undated) DEVICE — BARD® PARSONNET™ VASCULAR PROBE 1.0 MM X 45 CM: Brand: BARD® PARSONNET

## (undated) DEVICE — BETHLEHEM UNIVERSAL  MIONR EXT: Brand: CARDINAL HEALTH

## (undated) DEVICE — OCCLUSIVE GAUZE STRIP,3% BISMUTH TRIBROMOPHENATE IN PETROLATUM BLEND: Brand: XEROFORM

## (undated) DEVICE — SAW GIGLI 12 IN

## (undated) DEVICE — BULB SYRINGE,IRRIGATION WITH PROTECTIVE CAP: Brand: DOVER

## (undated) DEVICE — STOCKINETTE REGULAR

## (undated) DEVICE — SUT ETHIBOND 2-0 SH-1/SH-1 30 IN X763H

## (undated) DEVICE — SUT SILK 0 CT-1 30 IN 424H

## (undated) DEVICE — AORTIC PUNCH 5.2 MM DISP

## (undated) DEVICE — SUT SILK 3-0 SH CR/8 18 IN C013D

## (undated) DEVICE — SUT SILK 2 60 IN SA8H

## (undated) DEVICE — TRAY FOLEY 16FR SURESTEP TEMP SENS URIMETER STAT LOK

## (undated) DEVICE — COBAN 6 IN STERILE